# Patient Record
Sex: MALE | Race: WHITE | NOT HISPANIC OR LATINO | Employment: OTHER | URBAN - METROPOLITAN AREA
[De-identification: names, ages, dates, MRNs, and addresses within clinical notes are randomized per-mention and may not be internally consistent; named-entity substitution may affect disease eponyms.]

---

## 2017-01-02 ENCOUNTER — HOSPITAL ENCOUNTER (EMERGENCY)
Facility: HOSPITAL | Age: 67
Discharge: HOME | End: 2017-01-02
Attending: EMERGENCY MEDICINE
Payer: MEDICARE

## 2017-01-02 VITALS
WEIGHT: 235 LBS | BODY MASS INDEX: 36.88 KG/M2 | OXYGEN SATURATION: 96 % | HEART RATE: 65 BPM | TEMPERATURE: 99 F | SYSTOLIC BLOOD PRESSURE: 124 MMHG | HEIGHT: 67 IN | DIASTOLIC BLOOD PRESSURE: 57 MMHG | RESPIRATION RATE: 18 BRPM

## 2017-01-02 DIAGNOSIS — R07.81 RIB PAIN: ICD-10-CM

## 2017-01-02 PROCEDURE — 99284 EMERGENCY DEPT VISIT MOD MDM: CPT | Mod: ,,, | Performed by: EMERGENCY MEDICINE

## 2017-01-02 PROCEDURE — 94799 UNLISTED PULMONARY SVC/PX: CPT

## 2017-01-02 PROCEDURE — 99283 EMERGENCY DEPT VISIT LOW MDM: CPT

## 2017-01-02 PROCEDURE — 25000003 PHARM REV CODE 250: Performed by: EMERGENCY MEDICINE

## 2017-01-02 RX ORDER — HYDROCODONE BITARTRATE AND ACETAMINOPHEN 5; 325 MG/1; MG/1
2 TABLET ORAL
Status: COMPLETED | OUTPATIENT
Start: 2017-01-02 | End: 2017-01-02

## 2017-01-02 RX ORDER — HYDROCODONE BITARTRATE AND ACETAMINOPHEN 5; 325 MG/1; MG/1
1 TABLET ORAL EVERY 4 HOURS PRN
Qty: 18 TABLET | Refills: 0 | Status: SHIPPED | OUTPATIENT
Start: 2017-01-02 | End: 2017-06-12 | Stop reason: SDUPTHER

## 2017-01-02 RX ADMIN — HYDROCODONE BITARTRATE AND ACETAMINOPHEN 2 TABLET: 5; 325 TABLET ORAL at 05:01

## 2017-01-02 NOTE — ED PROVIDER NOTES
"Encounter Date: 1/2/2017    SCRIBE #1 NOTE: I, Debbie Arnold, am scribing for, and in the presence of,  Dr. Burciaga. I have scribed the entire note.       History     Chief Complaint   Patient presents with    Rib Injury     Pt reports sneezing and hearing a popping sound to left rib. States "feels like my rib popped."     Review of patient's allergies indicates:   Allergen Reactions    Keflex [cephalexin]     Peaches [peach (prunus persica)]     Shellfish containing products     Tuberculin spenser test ppd     Fig tree Rash     HPI Comments: Time patient was seen by the provider: 4:04 AM      The patient is a 66 y.o. male with history of: HLD, DVT, CAD, MI, PE, diverticulosis, and obesity that presents to the ED after sneezing and hearing "a pop" in his left rib. He now reports pain to the area with blowing his nose and movement. Patient endorses recent sore throat and sinus congestion, but denies fever, chills, or shortness of breath. He has a pacemaker and was worried he may have damaged it.       The history is provided by the patient.     Past Medical History   Diagnosis Date    Chronic anticoagulation 5/5/2016    Chronic combined systolic and diastolic heart failure 11/26/2012     EF 10-20% on ECHO 2013    Clotting disorder     Coronary artery disease involving native coronary artery of native heart without angina pectoris 11/26/2012     Cath 10- Stents patent non-obstructive disease Cath 11-12015 non-obstructive disease     Diverticulosis of colon     DVT (deep venous thrombosis), unspecified laterality 11/12/2015    Essential hypertension 11/15/2015    Hyperlipidemia     Hypertensive heart disease with heart failure 5/5/2016    MI (myocardial infarction) 2009    Nicotine abuse     Obesity 11/26/2012    Pulmonary embolism 2011    Stented coronary artery 11/26/2012     LAD stent placed 10/17/2007      Past Medical History Pertinent Negatives   Diagnosis Date Noted    Diabetes " mellitus, type 2 6/2/2014     Past Surgical History   Procedure Laterality Date    Back surgery      Appendectomy      Cardiac surgery       stent    Tonsillectomy      R knee scope       Family History   Problem Relation Age of Onset    Cancer Mother      colon cancer    Heart disease Mother     Heart disease Father     Stroke Father     Colon polyps Father     Cancer Paternal Grandmother      leukemia    Diabetes Neg Hx      Social History   Substance Use Topics    Smoking status: Former Smoker     Packs/day: 1.00     Years: 45.00     Types: Cigarettes     Quit date: 12/14/2015    Smokeless tobacco: Never Used      Comment: 1-1.5 ppd every day.    Alcohol use No     Review of Systems   Constitutional: Negative for chills and fever.   HENT: Positive for congestion and sore throat.    Respiratory: Negative for shortness of breath.    Musculoskeletal:        Positive for left rib pain       Physical Exam   Initial Vitals   BP Pulse Resp Temp SpO2   01/02/17 0349 01/02/17 0349 01/02/17 0349 01/02/17 0349 01/02/17 0349   124/57 66 18 98.6 °F (37 °C) 95 %     Physical Exam    Nursing note and vitals reviewed.  Constitutional: He appears well-developed and well-nourished. He is not diaphoretic. No distress.   HENT:   Head: Normocephalic and atraumatic.   Right Ear: External ear normal.   Left Ear: External ear normal.   Cardiovascular: Normal rate, regular rhythm and normal heart sounds.   No murmur heard.  Pulmonary/Chest: Breath sounds normal. No respiratory distress. He has no wheezes. He has no rales.   Musculoskeletal:   Tenderness to palpation to left anterior lower rib cage reproducing pain          ED Course   Procedures  Labs Reviewed - No data to display       X-Rays:   Independently Interpreted Readings:   Other Readings:  X-ray ribs - no acute process    Medical Decision Making:   History:   Old Medical Records: I decided to obtain old medical records.  Initial Assessment:   Patient with left  anterior rib pain likely secondary to rib strain or fracture. Will x-ray and discharge home.   Differential Diagnosis:   My initial differential diagnoses include but are not limited to: rib fracture, rib strain, pneumothorax.   Independently Interpreted Test(s):   I have ordered and independently interpreted X-rays - see prior notes.  Clinical Tests:   Radiological Study: Ordered    Additional MDM:   X-Rays: I have independently interpreted X-Ray(s) - see notes.          Scribe Attestation:   Scribe #1: I performed the above scribed service and the documentation accurately describes the services I performed. I attest to the accuracy of the note.    Attending Attestation:           Physician Attestation for Scribe:  Physician Attestation Statement for Scribe #1: I, Dr. Burciaga, reviewed documentation, as scribed by Debbie Arnold  in my presence, and it is both accurate and complete.         Attending ED Notes:   X-ray unremarkable. Will discharge home with pain medication and incentive spirometer.           ED Course     Clinical Impression:   The encounter diagnosis was Rib pain.    Disposition:   Disposition: Discharged  Condition: Stable       Peyman Burciaga III, MD  01/02/17 9080

## 2017-01-02 NOTE — ED AVS SNAPSHOT
[facility]  [address] [facility] [address]               Audrey Oneil JrFly   2017  3:56 AM   ED    Description:  Male : 1950   Department:  [facility]           Your Care was Coordinated By:     Provider Role From To    Peyman Burciaga III, MD Attending Provider 17 0357 --      Reason for Visit     Rib Injury           Diagnoses this Visit        Comments    Rib pain           ED Disposition     None           To Do List           Follow-up Information     Follow up with Km Thomas MD. Schedule an appointment as soon as possible for a visit in 3 days.    Specialty:  Internal Medicine    Contact information:    [address]  [facility] [address]  [phone]         These Medications        Disp Refills Start End    hydrocodone-acetaminophen 5-325mg (NORCO) 5-325 mg per tablet 18 tablet 0 2017     Take 1 tablet by mouth every 4 (four) hours as needed for Pain. - Oral    [facility] [address] [place] [address] [place] [address] AT [facility] [address] Ph #: [phone]         [facility]     [facility] Nurse Care Line -  Assistance  Registered nurses in the [facility] provide clinical advisement, health education, appointment booking, and other advisory services.  Call for this free service at [phone].             Medications           Message regarding Medications     Verify the changes and/or additions to your medication regime listed below are the same as discussed with your clinician today.  If any of these changes or additions are incorrect, please notify your healthcare provider.        START taking these NEW medications        Refills    hydrocodone-acetaminophen 5-325mg (NORCO) 5-325 mg per tablet 0    Sig: Take 1 tablet by mouth every 4 (four) hours as needed for Pain.    Class: Print    Route: Oral      STOP taking these medications      hydrocodone-acetaminophen 10-325mg (NORCO)  mg Tab Take 1 tablet by mouth every 4 (four) hours as needed for Pain.    VICODIN ES 7.5-300 mg Tab            Verify that the below list of medications is an accurate representation of the medications you are currently taking.  If none reported, the list may be blank. If incorrect, please contact your healthcare provider. Carry this list with you in case of emergency.           Current Medications     amiodarone (PACERONE) 200 MG Tab Take 1.5 tablets (300 mg total) by mouth once daily.    aspirin (ECOTRIN) 325 MG EC tablet Take 325 mg by mouth once daily.    atorvastatin (LIPITOR) 80 MG tablet Take 1 tablet (80 mg total) by mouth once daily.    clopidogrel (PLAVIX) 75 mg tablet Take 75 mg by mouth once daily.    clopidogrel (PLAVIX) 75 mg tablet TAKE 1 TABLET BY MOUTH EVERY DAY    dexamethasone (DECADRON) 0.1 % DrpS 2 drops every 12 (twelve) hours.    dexlansoprazole (DEXILANT) 60 mg capsule Take 60 mg by mouth once daily.    docusate sodium (COLACE) 50 MG capsule Take 1 capsule (50 mg total) by mouth once daily.    furosemide (LASIX) 40 MG tablet Take 1 tablet (40 mg total) by mouth once daily.    hydrocodone-acetaminophen 5-325mg (NORCO) 5-325 mg per tablet Take 1 tablet by mouth every 4 (four) hours as needed for Pain.    lisinopril (PRINIVIL,ZESTRIL) 5 MG tablet Take 1 tablet (5 mg total) by mouth once daily.    metoprolol succinate (TOPROL-XL) 50 MG 24 hr tablet TAKE 1 TABLET BY MOUTH ONCE DAILY    nitroGLYCERIN (NITROSTAT) 0.4 MG SL tablet Place 1 tablet (0.4 mg total) under the tongue every 5 (five) minutes as needed for Chest pain.    ondansetron (ZOFRAN-ODT) 4 MG TbDL Take 1 tablet (4 mg total) by mouth every 8 (eight) hours as needed.    polyethylene glycol (GLYCOLAX) 17 gram/dose powder Take 17 g by mouth once daily.    promethazine (PHENERGAN) 25 MG tablet Take 1 tablet (25 mg total) by mouth every 4 (four) hours as needed for Nausea.    spironolactone  "(ALDACTONE) 25 MG tablet TAKE 1 TABLET(25 MG) BY MOUTH EVERY DAY           Clinical Reference Information           Your Vitals Were     BP Pulse Temp Resp Height Weight    124/57 65 98.6 °F (37 °C) (Oral) 18 5' 7" (1.702 m) 106.6 kg (235 lb)    SpO2 BMI             96% 36.81 kg/m2         Allergies as of 1/2/2017        Reactions    Keflex [Cephalexin]     Peaches [Peach (Prunus Persica)]     Shellfish Containing Products     Tuberculin Shital Test Ppd     Fig Tree Rash      Immunizations Administered on Date of Encounter - 1/2/2017     None      ED Micro, Lab, POCT     None      ED Imaging Orders     Start Ordered       Status Ordering Provider    01/02/17 0410 01/02/17 0410  X-Ray Ribs 2 View Left  1 time imaging      Final result       Discharge References/Attachments     RIB CONTUSION OR MINOR FRACTURE (ENGLISH)      Your Scheduled Appointments     Mar 13, 2017 11:40 AM CDT   Debrillator Tune Up with PACEMAKER, ICD   [facility] ([address] ) [address]  [facility] [address] [phone]              Smoking Cessation     If you would like to quit smoking:   You may be eligible for free services if you are a [place] resident and started smoking cigarettes before September 1, 1988.  Call the Smoking Cessation Trust ([facility]) toll free at (406) 069-4265 or ([phone] 963-9939.   Call 0-478-QUIT-NOW if you do not meet the above criteria.             [facility] complies with applicable Federal civil rights laws and does not discriminate on the basis of race, color, national origin, age, disability, or sex.        Language Assistance Services     ATTENTION: Language assistance services are available, free of charge. Please call [phone].      ATENCIÓN: Si habla español, tiene a garcia disposición servicios gratuitos de asistencia lingüística. Llame al [phone].     CHÚ Ý: N?u b?n nói Ti?ng Vi?t, có các d?ch v? h? tr? ngôn ng? mi?n phí dành cho b?n. G?i s? " [phone].

## 2017-01-02 NOTE — ED NOTES
Pt assisted to bed. Pt identifiers verified.  Appearance: Pt is awake and alert to person, place and time Pt is clean and well groomed with clothes appropriately fastened  Respiratory: respirations are even and unlabored. Airway is patent  Skin: skin is warm, dry, intact and appropriately colored for ethnicity  Neurologic: pt is moving all extremities without difficulty. Sensation is intact. Speech is clear and appropriate. Facial movement is symmetrical.   Cardiac: No edema noted. Peripheral pulses are intact and palpable. Cap refill is <3 seconds.     Bed is in low, locked position with side rails upx2. Call light is in reach. Will continue to monitor

## 2017-01-02 NOTE — ED TRIAGE NOTES
66 year old pt presents to the ed with complaints of a rib injury. Pt states he hit his chest and felt something pop. Pt denies any chest pain sob or numbness to any extremities.

## 2017-01-05 ENCOUNTER — HOSPITAL ENCOUNTER (EMERGENCY)
Facility: HOSPITAL | Age: 67
Discharge: HOME | End: 2017-01-05
Attending: FAMILY MEDICINE
Payer: MEDICARE

## 2017-01-05 VITALS
HEIGHT: 67 IN | OXYGEN SATURATION: 96 % | SYSTOLIC BLOOD PRESSURE: 119 MMHG | RESPIRATION RATE: 18 BRPM | WEIGHT: 234 LBS | DIASTOLIC BLOOD PRESSURE: 61 MMHG | BODY MASS INDEX: 36.73 KG/M2 | TEMPERATURE: 99 F | HEART RATE: 75 BPM

## 2017-01-05 DIAGNOSIS — M54.12 CERVICAL RADICULOPATHY: Primary | ICD-10-CM

## 2017-01-05 PROCEDURE — 96372 THER/PROPH/DIAG INJ SC/IM: CPT

## 2017-01-05 PROCEDURE — 93010 ELECTROCARDIOGRAM REPORT: CPT | Mod: ,,, | Performed by: INTERNAL MEDICINE

## 2017-01-05 PROCEDURE — 99284 EMERGENCY DEPT VISIT MOD MDM: CPT | Mod: 25

## 2017-01-05 PROCEDURE — 63600175 PHARM REV CODE 636 W HCPCS: Performed by: FAMILY MEDICINE

## 2017-01-05 PROCEDURE — 93005 ELECTROCARDIOGRAM TRACING: CPT

## 2017-01-05 PROCEDURE — 99283 EMERGENCY DEPT VISIT LOW MDM: CPT | Mod: ,,, | Performed by: FAMILY MEDICINE

## 2017-01-05 RX ORDER — TRIAMCINOLONE ACETONIDE 40 MG/ML
80 INJECTION, SUSPENSION INTRA-ARTICULAR; INTRAMUSCULAR
Status: COMPLETED | OUTPATIENT
Start: 2017-01-05 | End: 2017-01-05

## 2017-01-05 RX ADMIN — TRIAMCINOLONE ACETONIDE 80 MG: 40 INJECTION, SUSPENSION INTRA-ARTICULAR; INTRAMUSCULAR at 07:01

## 2017-01-05 NOTE — ED AVS SNAPSHOT
[facility]  [address]  [facility] [address]               Audrey Oneil JrFly   2017  7:03 PM   ED    Description:  Male : 1950   Department:  [facility]           Your Care was Coordinated By:     Provider Role From To    Dez Berger MD Attending Provider 17 3023 --      Reason for Visit     Wrist Pain           Diagnoses this Visit        Comments    Cervical radiculopathy    -  Primary       ED Disposition     ED Disposition Condition Comment    Discharge  D/C after injection time.            To Do List           Follow-up Information     Follow up with Km Thomas MD In 3 days.    Specialty:  Internal Medicine    Why:  for document improvement.     Contact information:    [address]  [facility] [address]  [phone]        [facility]     [facility] Care [facility] -  Assistance  Registered nurses in the [facility] provide clinical advisement, health education, appointment booking, and other advisory services.  Call for this free service at [phone].             Medications           Message regarding Medications     Verify the changes and/or additions to your medication regime listed below are the same as discussed with your clinician today.  If any of these changes or additions are incorrect, please notify your healthcare provider.        These medications were administered today        Dose Freq    triamcinolone acetonide injection 80 mg 80 mg ED 1 Time    Sig: Inject 2 mLs (80 mg total) into the muscle ED 1 Time.    Class: Normal    Route: Intramuscular           Verify that the below list of medications is an accurate representation of the medications you are currently taking.  If none reported, the list may be blank. If incorrect, please contact your healthcare provider. Carry this list with you in case of emergency.           Current Medications     amiodarone (PACERONE)  "200 MG Tab Take 1.5 tablets (300 mg total) by mouth once daily.    aspirin (ECOTRIN) 325 MG EC tablet Take 325 mg by mouth once daily.    atorvastatin (LIPITOR) 80 MG tablet Take 1 tablet (80 mg total) by mouth once daily.    clopidogrel (PLAVIX) 75 mg tablet Take 75 mg by mouth once daily.    clopidogrel (PLAVIX) 75 mg tablet TAKE 1 TABLET BY MOUTH EVERY DAY    dexamethasone (DECADRON) 0.1 % DrpS 2 drops every 12 (twelve) hours.    dexlansoprazole (DEXILANT) 60 mg capsule Take 60 mg by mouth once daily.    docusate sodium (COLACE) 50 MG capsule Take 1 capsule (50 mg total) by mouth once daily.    furosemide (LASIX) 40 MG tablet Take 1 tablet (40 mg total) by mouth once daily.    hydrocodone-acetaminophen 5-325mg (NORCO) 5-325 mg per tablet Take 1 tablet by mouth every 4 (four) hours as needed for Pain.    lisinopril (PRINIVIL,ZESTRIL) 5 MG tablet Take 1 tablet (5 mg total) by mouth once daily.    metoprolol succinate (TOPROL-XL) 50 MG 24 hr tablet TAKE 1 TABLET BY MOUTH ONCE DAILY    ondansetron (ZOFRAN-ODT) 4 MG TbDL Take 1 tablet (4 mg total) by mouth every 8 (eight) hours as needed.    polyethylene glycol (GLYCOLAX) 17 gram/dose powder Take 17 g by mouth once daily.    promethazine (PHENERGAN) 25 MG tablet Take 1 tablet (25 mg total) by mouth every 4 (four) hours as needed for Nausea.    spironolactone (ALDACTONE) 25 MG tablet TAKE 1 TABLET(25 MG) BY MOUTH EVERY DAY    nitroGLYCERIN (NITROSTAT) 0.4 MG SL tablet Place 1 tablet (0.4 mg total) under the tongue every 5 (five) minutes as needed for Chest pain.    triamcinolone acetonide injection 80 mg Inject 2 mLs (80 mg total) into the muscle ED 1 Time.           Clinical Reference Information           Your Vitals Were     BP Pulse Temp Resp Height Weight    119/61 75 98.7 °F (37.1 °C) (Oral) 18 5' 7" (1.702 m) 106.1 kg (234 lb)    SpO2 BMI             96% 36.65 kg/m2         Allergies as of 1/5/2017        Reactions    Keflex [Cephalexin]     Peaches [Peach " (Prunus Persica)]     Shellfish Containing Products     Tuberculin Shital Test Ppd     Fig Tree Rash      Immunizations Administered on Date of Encounter - 1/5/2017     None      ED Micro, Lab, POCT     None      ED Imaging Orders     None        Discharge Instructions         Understanding Cervical Radiculopathy    Cervical radiculopathy is irritation or inflammation of a nerve root in the neck. It causes neck pain and other symptoms that may spread into the chest or down the arm. To understand this condition, it helps to understand the parts of the spine:  · Vertebrae. These are bones that stack to form the spine. The cervical spine contains the 7 vertebrae in the neck.  · Disks. These are soft pads of tissue between the vertebrae. They act as shock absorbers for the spine.  · The spinal canal. This is a tunnel formed within the stacked vertebrae. The spinal cord runs through this canal.  · Nerves. These branch off the spinal cord. As they leave the spinal canal, nerves pass through openings between the vertebrae. The nerve root is the part of the nerve that is closest to the spinal cord.   With cervical radiculopathy, nerve roots in the neck become irritated. This leads to pain and symptoms that can travel to the nerves that go from the spinal cord down the arms and into the torso.  What causes cervical radiculopathy?  Aging, injury, poor posture, and other issues can lead to problems in the neck. These problems may then irritate nerve roots. These include:  · Damage to a disk in the cervical spine. The damaged disk may then press on nearby nerve roots.  · Degeneration from wear and tear, and aging. This can lead to narrowing (stenosis) of the openings between the vertebrae. The narrowed openings press on nerve roots as they leave the spinal canal.  · An unstable spine. This is when a vertebra slips forward. It can then press on a nerve root.  There are other, less common causes of pressure on nerves in the neck.  These include infection, cysts, and tumors.  Symptoms of cervical radiculopathy  These include:  · Neck pain  · Pain, numbness, tingling, or weakness that travels down the arm  · Loss of neck movement  · Muscle spasms  Treatment for cervical radiculopathy  In most cases, your healthcare provider will first try treatments that help relieve symptoms. These may include:  · Prescription or over-the-counter pain medicines. These help relieve pain and swelling.  · Cold packs. These help reduce pain.  · Resting. This involves avoiding positions and activities that increase pain.  · Neck brace (cervical collar). This can help relieve inflammation and pain.  · Physical therapy, including exercises and stretches. This can help decrease pain and increase movement and function.  · Shots of medicinesaround the nerve roots. This is done to help relieve symptoms for a time.  In some cases, your healthcare provider may advise surgery to fix the underlying problem. This depends on the cause, the symptoms, and how long the pain has lasted.  Possible complications  Over time, an irritated and inflamed nerve may become damaged. This may lead to long-lasting (permanent) numbness or weakness. If symptoms change suddenly or get worse, be sure to let your healthcare provider know.     When to call your healthcare provider  Call your healthcare provider right away if you have any of these:  · New pain or pain that gets worse  · New or increasing weakness, numbness, or tingling in your arm or hand  · Bowel or bladder changes   © 2682-3262 [employer]. [address] [place] [address], [place] [address]. All rights reserved. This information is not intended as a substitute for professional medical care. Always follow your healthcare professional's instructions.          Your Scheduled Appointments     Mar 13, 2017 11:40 AM CDT   Debrillator Tune Up with PACEMAKER, ICD   [facility] ([address] [address] )    [address]  [place]  [facility] [id number] [phone]              Smoking Cessation     If you would like to quit smoking:   You may be eligible for free services if you are a [place] resident and started smoking cigarettes before September 1, 1988.  Call the Smoking Cessation Trust ([employer]) toll free at (607) 823-1866 or (850) 542-0319.   Call 1-800-QUIT-NOW if you do not meet the above criteria.             [facility] complies with applicable Federal civil rights laws and does not discriminate on the basis of race, color, national origin, age, disability, or sex.        Language Assistance Services     ATTENTION: Language assistance services are available, free of charge. Please call [phone].      ATENCIÓN: Si habla gustavo, tiene a garcia disposición servicios gratuitos de asistencia lingüística. Llame al [phone].     CHÚ Ý: N?u b?n nói Ti?ng Vi?t, có các d?ch v? h? tr? ngôn ng? mi?n phí dành cho b?n. G?i s? [phone].

## 2017-01-05 NOTE — PROVIDER PROGRESS NOTES - EMERGENCY DEPT.
Encounter Date: 1/5/2017    ED Physician Progress Notes       SCRIBE NOTE: I, Kait Sears, am scribing for, and in the presence of,  Dr. Romero.  Physician Statement: I, Dr. Romero, personally performed the services described in this documentation as scribed by Kait Sears in my presence, and it is both accurate and complete.      EKG - STEMI Decision  Initial Reading: No STEMI present.

## 2017-01-06 NOTE — DISCHARGE INSTRUCTIONS
Understanding Cervical Radiculopathy    Cervical radiculopathy is irritation or inflammation of a nerve root in the neck. It causes neck pain and other symptoms that may spread into the chest or down the arm. To understand this condition, it helps to understand the parts of the spine:  · Vertebrae. These are bones that stack to form the spine. The cervical spine contains the 7 vertebrae in the neck.  · Disks. These are soft pads of tissue between the vertebrae. They act as shock absorbers for the spine.  · The spinal canal. This is a tunnel formed within the stacked vertebrae. The spinal cord runs through this canal.  · Nerves. These branch off the spinal cord. As they leave the spinal canal, nerves pass through openings between the vertebrae. The nerve root is the part of the nerve that is closest to the spinal cord.   With cervical radiculopathy, nerve roots in the neck become irritated. This leads to pain and symptoms that can travel to the nerves that go from the spinal cord down the arms and into the torso.  What causes cervical radiculopathy?  Aging, injury, poor posture, and other issues can lead to problems in the neck. These problems may then irritate nerve roots. These include:  · Damage to a disk in the cervical spine. The damaged disk may then press on nearby nerve roots.  · Degeneration from wear and tear, and aging. This can lead to narrowing (stenosis) of the openings between the vertebrae. The narrowed openings press on nerve roots as they leave the spinal canal.  · An unstable spine. This is when a vertebra slips forward. It can then press on a nerve root.  There are other, less common causes of pressure on nerves in the neck. These include infection, cysts, and tumors.  Symptoms of cervical radiculopathy  These include:  · Neck pain  · Pain, numbness, tingling, or weakness that travels down the arm  · Loss of neck movement  · Muscle spasms  Treatment for cervical radiculopathy  In most cases,  your healthcare provider will first try treatments that help relieve symptoms. These may include:  · Prescription or over-the-counter pain medicines. These help relieve pain and swelling.  · Cold packs. These help reduce pain.  · Resting. This involves avoiding positions and activities that increase pain.  · Neck brace (cervical collar). This can help relieve inflammation and pain.  · Physical therapy, including exercises and stretches. This can help decrease pain and increase movement and function.  · Shots of medicinesaround the nerve roots. This is done to help relieve symptoms for a time.  In some cases, your healthcare provider may advise surgery to fix the underlying problem. This depends on the cause, the symptoms, and how long the pain has lasted.  Possible complications  Over time, an irritated and inflamed nerve may become damaged. This may lead to long-lasting (permanent) numbness or weakness. If symptoms change suddenly or get worse, be sure to let your healthcare provider know.     When to call your healthcare provider  Call your healthcare provider right away if you have any of these:  · New pain or pain that gets worse  · New or increasing weakness, numbness, or tingling in your arm or hand  · Bowel or bladder changes   © 4571-6451 [employer]. [address] [place] [address], [place] [address]. All rights reserved. This information is not intended as a substitute for professional medical care. Always follow your healthcare professional's instructions.

## 2017-01-06 NOTE — PROVIDER PROGRESS NOTES - EMERGENCY DEPT.
Encounter Date: 1/5/2017    ED Physician Progress Notes        Physician Note:   Normal sinus rhythm with ventricular intraventricular conduction delay.  No change from prior EKG    EKG - STEMI Decision  Initial Reading: No STEMI present.

## 2017-01-06 NOTE — ED TRIAGE NOTES
Pt reports right arm and hand pain that began yesterday and has gotten progressively worse. Pt reports it is extremely painful to move his right arm but he can move it, extremity is warm to touch and redness is noted. No other symptoms reported.

## 2017-01-06 NOTE — PROVIDER PROGRESS NOTES - EMERGENCY DEPT.
Encounter Date: 1/5/2017    ED Physician Progress Notes        Physician Note:    Reviewed data from the [place], [place] and [facility] controlled drug databases.  Regarding prescriptions.  Daily hydrocodone therapy.  30 mg per day

## 2017-01-06 NOTE — ED NOTES
Appearance: Pt awake, alert & oriented to person, place & time. Pt in no acute distress at present time.   Skin: Skin warm, dry & intact. Mucous membranes moist. Skin turgor normal.   Respiratory: Respirations even, non-labored.   Neurologic: Pt moving all extremities without difficulty. Sensation intact.   Peripheral Vascular: All peripheral pulses present.  Focused: RUE extremity pain, redness and and warmth.

## 2017-01-06 NOTE — ED PROVIDER NOTES
"Encounter Date: 1/5/2017    SCRIBE #1 NOTE: I, Sena Hutchison, am scribing for, and in the presence of, Dr. Berger .       History     Chief Complaint   Patient presents with    Wrist Pain     both wrist and both hands , i can't even lift anything, started yesterday     Review of patient's allergies indicates:   Allergen Reactions    Keflex [cephalexin]     Peaches [peach (prunus persica)]     Shellfish containing products     Tuberculin spenser test ppd     Fig tree Rash     HPI Comments: Time seen by provider: 7:23 PM    This is a 66 y.o. male with history of HTN, HLD, CAD, CHF who presents with complaint of painful ROM of right forearm and hand since yesterday. Pt reports the pain radiates from the bone in his right hand all the way to his shoulder. Specifically, he endorses that the pain feels like "a muscle spilt in gymnastics". Pt reports spilling boiling water on his right hand but did not think his pain was associated with that incident. He described that he wants the pain to go away but does not want pain medication to bring home.         The history is provided by the patient.     Past Medical History   Diagnosis Date    Chronic anticoagulation 5/5/2016    Chronic combined systolic and diastolic heart failure 11/26/2012     EF 10-20% on ECHO 2013    Clotting disorder     Coronary artery disease involving native coronary artery of native heart without angina pectoris 11/26/2012     Cath 10- Stents patent non-obstructive disease Cath 11-12015 non-obstructive disease     Diverticulosis of colon     DVT (deep venous thrombosis), unspecified laterality 11/12/2015    Essential hypertension 11/15/2015    Hyperlipidemia     Hypertensive heart disease with heart failure 5/5/2016    MI (myocardial infarction) 2009    Nicotine abuse     Obesity 11/26/2012    Pulmonary embolism 2011    Stented coronary artery 11/26/2012     LAD stent placed 10/17/2007      Past Medical History Pertinent Negatives "   Diagnosis Date Noted    Diabetes mellitus, type 2 6/2/2014     Past Surgical History   Procedure Laterality Date    Back surgery      Appendectomy      Cardiac surgery       stent    Tonsillectomy      R knee scope       Family History   Problem Relation Age of Onset    Cancer Mother      colon cancer    Heart disease Mother     Heart disease Father     Stroke Father     Colon polyps Father     Cancer Paternal Grandmother      leukemia    Diabetes Neg Hx      Social History   Substance Use Topics    Smoking status: Former Smoker     Packs/day: 1.00     Years: 45.00     Types: Cigarettes     Quit date: 12/14/2015    Smokeless tobacco: Never Used      Comment: 1-1.5 ppd every day.    Alcohol use No     Review of Systems   HENT: Negative for rhinorrhea.    Musculoskeletal:        Painful ROM of right forearm and hand.       Physical Exam   Initial Vitals   BP Pulse Resp Temp SpO2   01/05/17 1636 01/05/17 1636 01/05/17 1636 01/05/17 1636 01/05/17 1636   119/61 75 18 98.7 °F (37.1 °C) 96 %     Physical Exam    Nursing note and vitals reviewed.  Constitutional:   Disheveled.    HENT:   Head: Atraumatic.   Cardiovascular: Normal rate, regular rhythm, normal heart sounds and intact distal pulses.   2+ radial pulses bilaterally.    Pulmonary/Chest: Breath sounds normal. No respiratory distress. He has no wheezes. He has no rales.   Musculoskeletal:   Capillary refill in both hands. Painful ROM of right forearm and hand.    Neurological: He is alert and oriented to person, place, and time.   Skin:   No obvious hand fluctuants.          ED Course   Procedures  Labs Reviewed - No data to display          Medical Decision Making:   History:   Old Medical Records: I decided to obtain old medical records.  Initial Assessment:   Reviewed patient's prior EKG, x-rays and lab studies  Independently Interpreted Test(s):   I have ordered and independently interpreted EKG Reading(s) - see summary below       <> Summary  of EKG Reading(s): Sinus rhythm, no acute change from prior tracing  ED Management:  Patient's pain, not appear to be cardiac or pulmonary based on patient's symptoms and physical findings and EKG results cervical radiculopathy.  We'll manage short acting steroid injection.  Patient should follow-up with his regular care providers as this is been an ongoing problem for him in the past and he's having an acute flareup today            Scribe Attestation:   Scribe #1: I performed the above scribed service and the documentation accurately describes the services I performed. I attest to the accuracy of the note.    Attending Attestation:           Physician Attestation for Scribe:  Physician Attestation Statement for Scribe #1: I, Dr. Berger , reviewed documentation, as scribed by Sena Hutchison  in my presence, and it is both accurate and complete.                 ED Course     Clinical Impression:   The encounter diagnosis was Cervical radiculopathy.    Disposition:   Disposition: Discharged       Dez Berger MD  01/06/17 9110

## 2017-02-02 ENCOUNTER — OFFICE VISIT (OUTPATIENT)
Dept: TRANSPLANT | Facility: CLINIC | Age: 67
End: 2017-02-02
Payer: MEDICARE

## 2017-02-02 ENCOUNTER — LAB VISIT (OUTPATIENT)
Dept: LAB | Facility: HOSPITAL | Age: 67
End: 2017-02-02
Attending: INTERNAL MEDICINE
Payer: MEDICARE

## 2017-02-02 VITALS
DIASTOLIC BLOOD PRESSURE: 57 MMHG | BODY MASS INDEX: 37.27 KG/M2 | HEIGHT: 67 IN | SYSTOLIC BLOOD PRESSURE: 86 MMHG | HEART RATE: 58 BPM | WEIGHT: 237.44 LBS

## 2017-02-02 DIAGNOSIS — I25.5 CARDIOMYOPATHY, ISCHEMIC: Chronic | ICD-10-CM

## 2017-02-02 DIAGNOSIS — I25.10 CORONARY ARTERY DISEASE INVOLVING NATIVE CORONARY ARTERY OF NATIVE HEART WITHOUT ANGINA PECTORIS: ICD-10-CM

## 2017-02-02 DIAGNOSIS — I10 ESSENTIAL HYPERTENSION: ICD-10-CM

## 2017-02-02 DIAGNOSIS — I50.42 CHRONIC COMBINED SYSTOLIC AND DIASTOLIC HEART FAILURE: Chronic | ICD-10-CM

## 2017-02-02 DIAGNOSIS — E78.5 HYPERLIPIDEMIA LDL GOAL <70: ICD-10-CM

## 2017-02-02 DIAGNOSIS — I50.42 CHRONIC COMBINED SYSTOLIC AND DIASTOLIC HEART FAILURE: Primary | Chronic | ICD-10-CM

## 2017-02-02 DIAGNOSIS — Z95.810 AICD (AUTOMATIC CARDIOVERTER/DEFIBRILLATOR) PRESENT: ICD-10-CM

## 2017-02-02 LAB
ALBUMIN SERPL BCP-MCNC: 3.7 G/DL
ALP SERPL-CCNC: 101 U/L
ALT SERPL W/O P-5'-P-CCNC: 32 U/L
ANION GAP SERPL CALC-SCNC: 10 MMOL/L
ANION GAP SERPL CALC-SCNC: 10 MMOL/L
AST SERPL-CCNC: 18 U/L
BILIRUB SERPL-MCNC: 0.4 MG/DL
BNP SERPL-MCNC: 158 PG/ML
BUN SERPL-MCNC: 28 MG/DL
BUN SERPL-MCNC: 28 MG/DL
CALCIUM SERPL-MCNC: 9 MG/DL
CALCIUM SERPL-MCNC: 9 MG/DL
CHLORIDE SERPL-SCNC: 104 MMOL/L
CHLORIDE SERPL-SCNC: 104 MMOL/L
CO2 SERPL-SCNC: 26 MMOL/L
CO2 SERPL-SCNC: 26 MMOL/L
CREAT SERPL-MCNC: 1.3 MG/DL
CREAT SERPL-MCNC: 1.3 MG/DL
EST. GFR  (AFRICAN AMERICAN): >60 ML/MIN/1.73 M^2
EST. GFR  (AFRICAN AMERICAN): >60 ML/MIN/1.73 M^2
EST. GFR  (NON AFRICAN AMERICAN): 57.7 ML/MIN/1.73 M^2
EST. GFR  (NON AFRICAN AMERICAN): 57.7 ML/MIN/1.73 M^2
GLUCOSE SERPL-MCNC: 104 MG/DL
GLUCOSE SERPL-MCNC: 104 MG/DL
POTASSIUM SERPL-SCNC: 4.5 MMOL/L
POTASSIUM SERPL-SCNC: 4.5 MMOL/L
PROT SERPL-MCNC: 6.8 G/DL
SODIUM SERPL-SCNC: 140 MMOL/L
SODIUM SERPL-SCNC: 140 MMOL/L

## 2017-02-02 PROCEDURE — 99499 UNLISTED E&M SERVICE: CPT | Mod: S$GLB,,, | Performed by: INTERNAL MEDICINE

## 2017-02-02 PROCEDURE — 3074F SYST BP LT 130 MM HG: CPT | Mod: S$GLB,,, | Performed by: INTERNAL MEDICINE

## 2017-02-02 PROCEDURE — 99214 OFFICE O/P EST MOD 30 MIN: CPT | Mod: S$GLB,,, | Performed by: INTERNAL MEDICINE

## 2017-02-02 PROCEDURE — 99999 PR PBB SHADOW E&M-EST. PATIENT-LVL III: CPT | Mod: PBBFAC,,, | Performed by: INTERNAL MEDICINE

## 2017-02-02 PROCEDURE — 3078F DIAST BP <80 MM HG: CPT | Mod: S$GLB,,, | Performed by: INTERNAL MEDICINE

## 2017-02-02 NOTE — PROGRESS NOTES
Patient, Audrey Oneil Jr. (MRN #[id number]), presented with a recorded BMI of 37.19 kg/m^2 and a documented comorbidity(s):  - Hypertension  - Hyperlipidemia  - Atrial Fibrillation  to which the severe obesity is a contributing factor. This is consistent with the definition of severe obesity (BMI 35.0-35.9) with comorbidity (ICD-10 E66.01, Z68.35). The patient's severe obesity was monitored, evaluated, addressed and/or treated. This addendum to the medical record is made on 02/02/2017.

## 2017-02-02 NOTE — PROGRESS NOTES
"Subjective:     HPI:  Mr. Oneil is a very pleasant 66 yo male with a hx of CAD s/p STEMI(s/p PCI LAD 2007), CHF/ICM and remote MI, quit smoking Dec 2015,  PE (2010, s/p 1-yr coumadin), HTN, DLP and s/p ICD [facility] placement due to VT who comes for an urgent visit. He usually follows with Dr. Henry. At that time patient was very confused with HF regimen. His regimen was adjusted and he was started on Entresto, spironolactone and his coreg was changed to metoprolol. Patient states that he had stopped his Lisinopril as instructed for few days before starting Entresto however after 3 days on Entresto patient develop trouble swallowing and breathing states that "his throat was swollen". Denies any tongue swelling but states that his face was swollen. This is his 3rd visait with me. Remains stable with NYHA class II-III symptoms. No PND or orthopnea. No recent HF admissions. Labs are pending.      Past Medical History   Diagnosis Date    Chronic anticoagulation 5/5/2016    Chronic combined systolic and diastolic heart failure 11/26/2012     EF 10-20% on ECHO 2013    Clotting disorder     Coronary artery disease involving native coronary artery of native heart without angina pectoris 11/26/2012     Cath 10- Stents patent non-obstructive disease Cath 11-12015 non-obstructive disease     Diverticulosis of colon     DVT (deep venous thrombosis), unspecified laterality 11/12/2015    Essential hypertension 11/15/2015    Hyperlipidemia     Hypertensive heart disease with heart failure 5/5/2016    MI (myocardial infarction) 2009    Nicotine abuse     Obesity 11/26/2012    Pulmonary embolism 2011    Stented coronary artery 11/26/2012     LAD stent placed 10/17/2007      Past Surgical History   Procedure Laterality Date    Back surgery      Appendectomy      Cardiac surgery       stent    Tonsillectomy      R knee scope         Review of Systems   Constitution: Negative. Negative for chills, decreased " "appetite, diaphoresis, fever, weakness, malaise/fatigue, night sweats, weight gain and weight loss.   Eyes: Negative.    Cardiovascular: Negative for chest pain, claudication, cyanosis, dyspnea on exertion, irregular heartbeat, leg swelling, near-syncope, orthopnea, palpitations, paroxysmal nocturnal dyspnea and syncope.   Respiratory: Negative for cough, hemoptysis and shortness of breath.    Endocrine: Negative.    Hematologic/Lymphatic: Negative.    Skin: Negative for color change, dry skin and nail changes.   Musculoskeletal: Negative.    Gastrointestinal: Negative.    Genitourinary: Negative.        Objective:   Blood pressure (!) 86/57, pulse (!) 58, height 5' 7" (1.702 m), weight 107.7 kg (237 lb 7 oz).body mass index is 37.19 kg/(m^2).  Physical Exam   Constitutional: He appears well-developed.   BP (!) 86/57  Pulse (!) 58  Ht 5' 7" (1.702 m)  Wt 107.7 kg (237 lb 7 oz)  BMI 37.19 kg/m2     HENT:   Head: Normocephalic.   Neck: JVD present. Carotid bruit is not present.   Cardiovascular: Regular rhythm, normal heart sounds and normal pulses.  PMI is displaced.  Exam reveals no gallop and no S3.    No murmur heard.  Pulmonary/Chest: Effort normal and breath sounds normal. No respiratory distress. He has no wheezes. He has no rales.   Abdominal: Soft. Bowel sounds are normal. He exhibits no distension. There is no tenderness. There is no rebound.   Musculoskeletal: He exhibits no edema.   Neurological: He is alert.   Skin: Skin is warm.   Vitals reviewed.      Labs:    Chemistry        Component Value Date/Time     12/08/2016 0758    K 4.0 12/08/2016 0758     12/08/2016 0758    CO2 23 12/08/2016 0758    BUN 42 (H) 12/08/2016 0758    CREATININE 1.5 (H) 12/08/2016 0758     12/08/2016 0758        Component Value Date/Time    CALCIUM 9.0 12/08/2016 0758    ALKPHOS 71 12/08/2016 0758    AST 18 12/08/2016 0758    ALT 29 12/08/2016 0758    BILITOT 0.3 12/08/2016 0758          Magnesium   Date Value " Ref Range Status   09/27/2016 2.0 1.6 - 2.6 mg/dL Final     Lab Results   Component Value Date    WBC 8.69 12/08/2016    HGB 14.3 12/08/2016    HCT 42.8 12/08/2016     12/08/2016     Lab Results   Component Value Date    INR 0.9 12/08/2016    INR 0.9 07/03/2016    INR 1.1 12/06/2015     BNP   Date Value Ref Range Status   02/02/2017 158 (H) 0 - 99 pg/mL Final     Comment:     Values of less than 100 pg/ml are consistent with non-CHF populations.   09/27/2016 82 0 - 99 pg/mL Final     Comment:     Values of less than 100 pg/ml are consistent with non-CHF populations.   08/12/2016 114 (H) 0 - 99 pg/mL Final     Comment:     Values of less than 100 pg/ml are consistent with non-CHF populations.     No results found for: LDH  No results found for this or any previous visit.  No results found for this or any previous visit.    Assessment:      1. Chronic combined systolic and diastolic heart failure    2. Cardiomyopathy, ischemic    3. Coronary artery disease involving native coronary artery of native heart without angina pectoris    4. Essential hypertension    5. Hyperlipidemia LDL goal <70    6. AICD (automatic cardioverter/defibrillator) present        Plan:   Reports NYHA class II-III symptoms but may be downplaying his symptoms.   Most recent 2D echo with CFD showed enlarged LV (with severely depressed EF). In view of his class III symptoms and low BP, I would like to repeat his CPX for risk stratification.  Recommend 2 gram sodium restriction and 1500cc fluid restriction.  Encourage physical activity with graded exercise program.  Requested patient to weigh themselves daily, and to notify us if their weight increases by more than 3 lbs in 1 day or 5 lbs in 1 week.    Repeat CPX   RTC in 3 months with labs       Edison Marshall MD

## 2017-02-02 NOTE — MR AVS SNAPSHOT
[facility]  [address]  [facility] [address]  Phone: [phone]                  Audrey Oneil JrFly   2017 10:00 AM   Office Visit    Description:  Male : 1950   Provider:  Edison Marshall MD   Department:  [facility]           Reason for Visit     Congestive Heart Failure           Diagnoses this Visit        Comments    Chronic combined systolic and diastolic heart failure    -  Primary     Cardiomyopathy, ischemic         Coronary artery disease involving native coronary artery of native heart without angina pectoris         Essential hypertension         Hyperlipidemia LDL goal <70         AICD (automatic cardioverter/defibrillator) present                To Do List           Future Appointments        Provider Department Dept Phone    2017 10:00 AM Edison Marshall MD [facility] [phone]    3/13/2017 11:40 AM PACEMAKER, [facility] - Arrhythmia [phone]      Goals (5 Years of Data)     None      Follow-Up and Disposition     Return in about 3 months (around 2017).      [facility]     [facility] -  Assistance  Registered nurses in the [facility] provide clinical advisement, health education, appointment booking, and other advisory services.  Call for this free service at [phone].             Medications           Message regarding Medications     Verify the changes and/or additions to your medication regime listed below are the same as discussed with your clinician today.  If any of these changes or additions are incorrect, please notify your healthcare provider.        STOP taking these medications     dexamethasone (DECADRON) 0.1 % DrpS 2 drops every 12 (twelve) hours.    ondansetron (ZOFRAN-ODT) 4 MG TbDL Take 1 tablet (4 mg total) by mouth every 8 (eight) hours as needed.    polyethylene glycol (GLYCOLAX) 17 gram/dose powder Take 17 g by mouth once daily.    promethazine  "(PHENERGAN) 25 MG tablet Take 1 tablet (25 mg total) by mouth every 4 (four) hours as needed for Nausea.           Verify that the below list of medications is an accurate representation of the medications you are currently taking.  If none reported, the list may be blank. If incorrect, please contact your healthcare provider. Carry this list with you in case of emergency.           Current Medications     amiodarone (PACERONE) 200 MG Tab Take 1.5 tablets (300 mg total) by mouth once daily.    aspirin (ECOTRIN) 325 MG EC tablet Take 325 mg by mouth once daily.    atorvastatin (LIPITOR) 80 MG tablet Take 1 tablet (80 mg total) by mouth once daily.    clopidogrel (PLAVIX) 75 mg tablet Take 75 mg by mouth once daily.    dexlansoprazole (DEXILANT) 60 mg capsule Take 60 mg by mouth once daily.    docusate sodium (COLACE) 50 MG capsule Take 1 capsule (50 mg total) by mouth once daily.    furosemide (LASIX) 40 MG tablet Take 1 tablet (40 mg total) by mouth once daily.    hydrocodone-acetaminophen 5-325mg (NORCO) 5-325 mg per tablet Take 1 tablet by mouth every 4 (four) hours as needed for Pain.    lisinopril (PRINIVIL,ZESTRIL) 5 MG tablet Take 1 tablet (5 mg total) by mouth once daily.    metoprolol succinate (TOPROL-XL) 50 MG 24 hr tablet TAKE 1 TABLET BY MOUTH ONCE DAILY    nitroGLYCERIN (NITROSTAT) 0.4 MG SL tablet Place 1 tablet (0.4 mg total) under the tongue every 5 (five) minutes as needed for Chest pain.    spironolactone (ALDACTONE) 25 MG tablet TAKE 1 TABLET(25 MG) BY MOUTH EVERY DAY           Clinical Reference Information           Vital Signs - Last Recorded  Most recent update: 2/2/2017  8:15 AM by Rose Monique MA    BP Pulse Ht Wt BMI    (!) 86/57 (!) 58 5' 7" (1.702 m) 107.7 kg (237 lb 7 oz) 37.19 kg/m2      Blood Pressure          Most Recent Value    BP  (!)  86/57      Allergies as of 2/2/2017     Iodine Containing Multivitamin    Keflex [Cephalexin]    Peaches [Peach (Prunus Persica)]    Shellfish " Containing Products    Tuberculin Shital Test Ppd    Fig Tree      Immunizations Administered on Date of Encounter - 2/2/2017     None      Orders Placed During Today's Visit     Future Labs/Procedures Expected by Expires    Comprehensive metabolic panel  As directed 2/2/2018    CPX STUDY  As directed 2/2/2018    Recurring Lab Work Interval Expires    Brain natriuretic peptide  Every 4 Weeks until 2/2/2018 2/2/2018

## 2017-02-14 ENCOUNTER — HOSPITAL ENCOUNTER (EMERGENCY)
Facility: HOSPITAL | Age: 67
Discharge: HOME | End: 2017-02-15
Attending: EMERGENCY MEDICINE
Payer: MEDICARE

## 2017-02-14 VITALS
RESPIRATION RATE: 17 BRPM | WEIGHT: 230 LBS | BODY MASS INDEX: 36.1 KG/M2 | OXYGEN SATURATION: 95 % | HEART RATE: 75 BPM | SYSTOLIC BLOOD PRESSURE: 125 MMHG | TEMPERATURE: 99 F | DIASTOLIC BLOOD PRESSURE: 66 MMHG | HEIGHT: 67 IN

## 2017-02-14 DIAGNOSIS — L02.414 ABSCESS OF ARM, LEFT: Primary | ICD-10-CM

## 2017-02-14 PROCEDURE — 93005 ELECTROCARDIOGRAM TRACING: CPT

## 2017-02-14 PROCEDURE — 99283 EMERGENCY DEPT VISIT LOW MDM: CPT | Mod: 25

## 2017-02-14 PROCEDURE — 93010 ELECTROCARDIOGRAM REPORT: CPT | Mod: ,,, | Performed by: INTERNAL MEDICINE

## 2017-02-14 PROCEDURE — 99284 EMERGENCY DEPT VISIT MOD MDM: CPT | Mod: ,,, | Performed by: PHYSICIAN ASSISTANT

## 2017-02-14 RX ORDER — SULFAMETHOXAZOLE AND TRIMETHOPRIM 800; 160 MG/1; MG/1
1 TABLET ORAL 2 TIMES DAILY
Qty: 14 TABLET | Refills: 0 | Status: SHIPPED | OUTPATIENT
Start: 2017-02-14 | End: 2017-02-16

## 2017-02-14 RX ORDER — SULFAMETHOXAZOLE AND TRIMETHOPRIM 800; 160 MG/1; MG/1
1 TABLET ORAL
Status: COMPLETED | OUTPATIENT
Start: 2017-02-15 | End: 2017-02-14

## 2017-02-14 RX ADMIN — SULFAMETHOXAZOLE AND TRIMETHOPRIM 1 TABLET: 800; 160 TABLET ORAL at 11:02

## 2017-02-14 NOTE — ED AVS SNAPSHOT
[facility]  [address]  [facility] [address]               Audrey Oneil JrFly   2017 11:18 PM   ED    Description:  Male : 1950   Department:  [facility]           Your Care was Coordinated By:     Provider Role From To    Peyman Burciaga III, MD Attending Provider 17 2340 --    Angella Fonseca PA-C Physician Assistant 17 --    Alondra Saunders MD Resident 17 2331      Reason for Visit     Abscess           Diagnoses this Visit        Comments    Abscess of arm, left    -  Primary       ED Disposition     ED Disposition Condition Comment    Discharge             To Do List           Follow-up Information     Follow up with Km Thomas MD. Schedule an appointment as soon as possible for a visit in 3 days.    Specialty:  Internal Medicine    Why:  For reevaluation    Contact information:    [address]  [facility] [address]  [phone]         These Medications        Disp Refills Start End    sulfamethoxazole-trimethoprim 800-160mg (BACTRIM DS) 800-160 mg Tab 14 tablet 0 2017    Take 1 tablet by mouth 2 (two) times daily. - Oral    [facility] [address] [facility] [facility] [place] [address] [address]  AT [facility] OF [facility] [address] Ph #: [phone]         [facility]     [facility] -  Assistance  Registered nurses in the [facility] provide clinical advisement, health education, appointment booking, and other advisory services.  Call for this free service at [phone].             Medications           Message regarding Medications     Verify the changes and/or additions to your medication regime listed below are the same as discussed with your clinician today.  If any of these changes or additions are incorrect, please notify your healthcare provider.        START taking these NEW medications        Refills     sulfamethoxazole-trimethoprim 800-160mg (BACTRIM DS) 800-160 mg Tab 0    Sig: Take 1 tablet by mouth 2 (two) times daily.    Class: Print    Route: Oral      These medications were administered today        Dose Freq    sulfamethoxazole-trimethoprim 800-160mg per tablet 1 tablet 1 tablet ED 1 Time    Starting on: 2/15/2017    Sig: Take 1 tablet by mouth ED 1 Time.    Class: Normal    Route: Oral    Cosign for Ordering: Required by Peyman Burciaga III, MD           Verify that the below list of medications is an accurate representation of the medications you are currently taking.  If none reported, the list may be blank. If incorrect, please contact your healthcare provider. Carry this list with you in case of emergency.           Current Medications     amiodarone (PACERONE) 200 MG Tab Take 1.5 tablets (300 mg total) by mouth once daily.    aspirin (ECOTRIN) 325 MG EC tablet Take 325 mg by mouth once daily.    atorvastatin (LIPITOR) 80 MG tablet Take 1 tablet (80 mg total) by mouth once daily.    clopidogrel (PLAVIX) 75 mg tablet Take 75 mg by mouth once daily.    dexlansoprazole (DEXILANT) 60 mg capsule Take 60 mg by mouth once daily.    docusate sodium (COLACE) 50 MG capsule Take 1 capsule (50 mg total) by mouth once daily.    furosemide (LASIX) 40 MG tablet Take 1 tablet (40 mg total) by mouth once daily.    hydrocodone-acetaminophen 5-325mg (NORCO) 5-325 mg per tablet Take 1 tablet by mouth every 4 (four) hours as needed for Pain.    lisinopril (PRINIVIL,ZESTRIL) 5 MG tablet Take 1 tablet (5 mg total) by mouth once daily.    metoprolol succinate (TOPROL-XL) 50 MG 24 hr tablet TAKE 1 TABLET BY MOUTH ONCE DAILY    nitroGLYCERIN (NITROSTAT) 0.4 MG SL tablet Place 1 tablet (0.4 mg total) under the tongue every 5 (five) minutes as needed for Chest pain.    spironolactone (ALDACTONE) 25 MG tablet TAKE 1 TABLET(25 MG) BY MOUTH EVERY DAY    sulfamethoxazole-trimethoprim 800-160mg (BACTRIM DS) 800-160 mg Tab Take 1 tablet  "by mouth 2 (two) times daily.    sulfamethoxazole-trimethoprim 800-160mg per tablet 1 tablet Starting on Feb 15, 2017. Take 1 tablet by mouth ED 1 Time.           Clinical Reference Information           Your Vitals Were     BP Pulse Temp Resp Height Weight    125/66 (BP Location: Right arm, Patient Position: Sitting) 75 98.5 °F (36.9 °C) (Oral) 17 5' 7" (1.702 m) 104.3 kg (230 lb)    SpO2 BMI             95% 36.02 kg/m2         Allergies as of 2/14/2017        Reactions    Iodine Containing Multivitamin     Keflex [Cephalexin]     Peaches [Peach (Prunus Persica)]     Shellfish Containing Products     Tuberculin Shital Test Ppd     Fig Tree Rash      Immunizations Administered on Date of Encounter - 2/14/2017     None      ED Micro, Lab, POCT     None      ED Imaging Orders     None        Discharge Instructions         Abscess (Antibiotic Treatment Only)  An abscess (sometimes called a boil) happens when bacteria get trapped under the skin and start to grow. Pus forms inside the abscess as the body responds to the bacteria. An abscess can happen with an insect bite, ingrown hair, blocked oil gland, pimple, cyst, or puncture wound.  In the early stages, your wound may be red and tender. For this stage, you may get antibiotics. If the abscess does not get better with antibiotics, it will need to be drained with a small cut.  Home care  These tips will help you care for your abscess at home:  · Soak the wound in hot water or apply hot packs (small towel soaked in hot water) to the area for 20 minutes at a time. Do this 3 to 4 times a day.  · Do not cut, squeeze, or pop the boil yourself.  · Apply antibiotic cream or ointment to the skin 3 to 4 times a day, unless something else was prescribed. Some ointments include an antibiotic plus a pain reliever.  · If your doctor prescribed antibiotics, do not stop taking them until you have finished the medicine or the doctor tells you to stop.  · You may use an " over-the-counter pain medicine to control pain, unless another pain medicine was prescribed. If you have chronic liver or kidney disease or ever had a stomach ulcer or gastrointestinal bleeding, talk with your doctor before using these any of these.  Follow-up care  Follow up with your healthcare provider, or as advised. Check your wound each day for the signs of worsening infection listed below.  When to seek medical advice  Get prompt medical attention if any of these occur:  · An increase in redness or swelling  · Red streaks in the skin leading away from the abscess  · An increase in local pain or swelling  · Fever of 100.4ºF (38ºC) or higher, or as directed by your healthcare provider  · Pus or fluid coming from the abscess  · Boil returns after getting better  Date Last Reviewed: 9/1/2016  © 1938-5431 [employer]. [address] [place] [address], [place] [address]. All rights reserved. This information is not intended as a substitute for professional medical care. Always follow your healthcare professional's instructions.          Your Scheduled Appointments     Feb 15, 2017  1:00 PM CST   CARDI PULM Stress Test with CPX   [facility] - Echo/Stress Lab ([facility] )    [address]  [place] [address] [phone]            Mar 13, 2017 11:40 AM CDT   Debrillator Tune Up with PACEMAKER, ICD   [facility] - Arrhythmia ([facility] )    [address]  [place] [address] [phone]              Smoking Cessation     If you would like to quit smoking:   You may be eligible for free services if you are a [place] resident and started smoking cigarettes before September 1, 1988.  Call the Smoking Cessation Trust (SCT) toll free at (438) 711-2582 or (077) 810-0492.   Call 4-800-QUIT-NOW if you do not meet the above criteria.             [facility] complies with applicable Federal civil rights laws and does not discriminate on the basis of race, color,  national origin, age, disability, or sex.        Language Assistance Services     ATTENTION: Language assistance services are available, free of charge. Please call [phone].      ATENCIÓN: Si habla gustavo, tiene a garcia disposición servicios gratuitos de asistencia lingüística. Llame al [phone].     CHÚ Ý: N?u b?n nói Ti?ng Vi?t, có các d?ch v? h? tr? ngôn ng? mi?n phí dành cho b?n. G?i s? [phone].

## 2017-02-15 ENCOUNTER — HOSPITAL ENCOUNTER (OUTPATIENT)
Dept: CARDIOLOGY | Facility: CLINIC | Age: 67
Discharge: HOME | End: 2017-02-15
Payer: MEDICARE

## 2017-02-15 ENCOUNTER — TELEPHONE (OUTPATIENT)
Dept: INTERNAL MEDICINE | Facility: CLINIC | Age: 67
End: 2017-02-15

## 2017-02-15 DIAGNOSIS — I50.42 CHRONIC COMBINED SYSTOLIC AND DIASTOLIC HEART FAILURE: Chronic | ICD-10-CM

## 2017-02-15 LAB — DIASTOLIC DYSFUNCTION: NO

## 2017-02-15 PROCEDURE — 94621 CARDIOPULM EXERCISE TESTING: CPT | Mod: S$GLB,,, | Performed by: INTERNAL MEDICINE

## 2017-02-15 PROCEDURE — 25000003 PHARM REV CODE 250: Performed by: PHYSICIAN ASSISTANT

## 2017-02-15 NOTE — ED PROVIDER NOTES
Encounter Date: 2/14/2017    SCRIBE #1 NOTE: I, Stone Stewart, am scribing for, and in the presence of,  Dr. Burciaga. I have scribed the following portions of the note - the EKG reading and the APC attestation.       History     Chief Complaint   Patient presents with    Abscess     Pt has abscess to left forearm. Reports clear drainage.     Review of patient's allergies indicates:   Allergen Reactions    Iodine containing multivitamin     Keflex [cephalexin]     Peaches [peach (prunus persica)]     Shellfish containing products     Tuberculin spenser test ppd     Fig tree Rash     HPI Comments: 66-year-old male with a PMH significant for CHF, MI, PE, DVT, CAD, HTN presents to the ED with a chief complaint of abscess.  Patient reports a small sore to the left forearm for the past couple of days.  He attempted treatment with applying Neosporin as well as applying hot compresses without improvement.  Patient also notes an episode of lightheadedness with associated shortness of breath earlier today that lasted only a second.  He denies chest pain, nausea, diaphoresis with the episode.  Patient also denies fever, chills.    The history is provided by the patient.     Past Medical History   Diagnosis Date    Chronic anticoagulation 5/5/2016    Chronic combined systolic and diastolic heart failure 11/26/2012     EF 10-20% on ECHO 2013    Clotting disorder     Coronary artery disease involving native coronary artery of native heart without angina pectoris 11/26/2012     Cath 10- Stents patent non-obstructive disease Cath 11-12015 non-obstructive disease     Diverticulosis of colon     DVT (deep venous thrombosis), unspecified laterality 11/12/2015    Essential hypertension 11/15/2015    Hyperlipidemia     Hypertensive heart disease with heart failure 5/5/2016    MI (myocardial infarction) 2009    Nicotine abuse     Obesity 11/26/2012    Pulmonary embolism 2011    Stented coronary artery 11/26/2012      LAD stent placed 10/17/2007      Past Medical History Pertinent Negatives   Diagnosis Date Noted    Diabetes mellitus, type 2 6/2/2014     Past Surgical History   Procedure Laterality Date    Back surgery      Appendectomy      Cardiac surgery       stent    Tonsillectomy      R knee scope       Family History   Problem Relation Age of Onset    Cancer Mother      colon cancer    Heart disease Mother     Heart disease Father     Stroke Father     Colon polyps Father     Cancer Paternal Grandmother      leukemia    Diabetes Neg Hx      Social History   Substance Use Topics    Smoking status: Former Smoker     Packs/day: 1.00     Years: 45.00     Types: Cigarettes     Quit date: 12/14/2015    Smokeless tobacco: Never Used      Comment: 1-1.5 ppd every day.    Alcohol use No     Review of Systems   Constitutional: Negative for chills and fever.   Respiratory: Positive for shortness of breath.    Cardiovascular: Negative for chest pain.   Skin: Positive for wound.   Neurological: Positive for light-headedness. Negative for syncope.       Physical Exam   Initial Vitals   BP Pulse Resp Temp SpO2   02/14/17 2022 02/14/17 2022 02/14/17 2022 02/14/17 2022 02/14/17 2022   125/66 75 17 98.5 °F (36.9 °C) 95 %     Physical Exam    Constitutional: He appears well-developed and well-nourished. He is not diaphoretic.  Non-toxic appearance. He does not appear ill. No distress.   HENT:   Head: Normocephalic and atraumatic.   Eyes: EOM are normal. Right eye exhibits no discharge. Left eye exhibits no discharge.   Neck: Normal range of motion and phonation normal. Neck supple.   Cardiovascular: Normal rate and regular rhythm. Exam reveals no gallop, no distant heart sounds and no friction rub.    No murmur heard.  No peripheral edema   Pulmonary/Chest: Effort normal and breath sounds normal. No accessory muscle usage. No tachypnea. No respiratory distress. He has no decreased breath sounds. He has no wheezes. He has no  rhonchi. He has no rales.   Abdominal: Soft. Normal appearance. He exhibits no distension.   Protuberant abdomen   Musculoskeletal: Normal range of motion.   Neurological: He is alert and oriented to person, place, and time.   Skin: Skin is warm and dry. No rash noted. No pallor.   There is a pustule noted to the ulnar L forearm with surrounding cellulitis.  Small amount of purulent drainage was expressed.  There is no induration or significant fluctuance noted.   Psychiatric: He has a normal mood and affect. His behavior is normal. Judgment and thought content normal.         ED Course   Procedures  Labs Reviewed - No data to display  EKG Readings: (Independently Interpreted)   Atrially paced, left axis deviation, no ST elevation or depression           Medical Decision Making:   History:   Old Medical Records: I decided to obtain old medical records.  Differential Diagnosis:   Abscess, cellulitis, ACS, anxiety, orthostatic hypotension  Independently Interpreted Test(s):   I have ordered and independently interpreted EKG Reading(s) - see prior notes  Clinical Tests:   Medical Tests: Ordered       APC / Resident Notes:   MDM:  66-year-old male presents for evaluation of an abscess.  Patient also complains of a brief episode of lightheadedness and shortness of breath that occurred earlier today.  His vitals are within normal limits.  He is well-appearing and in no acute distress.  There is a pustule noted to the ulnar L forearm with surrounding cellulitis.  Small amount of purulent drainage was expressed.  There is no induration or significant fluctuance noted.  The lungs are clear to auscultation bilaterally.  Cardiac exam is unremarkable.  There is no peripheral edema noted.    EKG reveals no acute ischemic changes or malignant arrhythmias.    I do not feel that further workup is indicated as patient is currently asymptomatic and his symptoms lasted only one second.  Patient also has stress test scheduled for  tomorrow.  I feel that he is stable for discharge with antibiotics to treat for cellulitis versus early abscess.  First dose of Bactrim given in the ED.  Patient advised to apply warm compresses to the area. PCP follow-up in 3-4 days for reevaluation of wound.  ED warnings and return precautions given.  I have reviewed the patient's records and discussed this case with my supervising physician.         Scribe Attestation:   Scribe #1: I performed the above scribed service and the documentation accurately describes the services I performed. I attest to the accuracy of the note.    Attending Attestation:     Physician Attestation Statement for NP/PA:   I discussed this assessment and plan of this patient with the NP/PA, but I did not personally examine the patient. The face to face encounter was performed by the NP/PA.    Other NP/PA Attestation Additions:    History of Present Illness: Abscess         Physician Attestation for Scribe:  Physician Attestation Statement for Scribe #1: I, Dr. Burciaga, reviewed documentation, as scribed by Stone Stewart in my presence, and it is both accurate and complete.                 ED Course     Clinical Impression:   The encounter diagnosis was Abscess of arm, left.    Disposition:   Disposition: Discharged  Condition: Stable           Angella Fonseca PA-C  02/15/17 0041       Peyman Burciaga III, MD  02/15/17 0230

## 2017-02-15 NOTE — TELEPHONE ENCOUNTER
Patient with recent ER visit; listed with me for primary care but has never established care. Please call patient to set up visit to establish care, or he can establish with any other provider.

## 2017-02-16 ENCOUNTER — HOSPITAL ENCOUNTER (EMERGENCY)
Facility: HOSPITAL | Age: 67
Discharge: HOME | End: 2017-02-16
Attending: EMERGENCY MEDICINE
Payer: MEDICARE

## 2017-02-16 VITALS
RESPIRATION RATE: 18 BRPM | DIASTOLIC BLOOD PRESSURE: 51 MMHG | OXYGEN SATURATION: 95 % | SYSTOLIC BLOOD PRESSURE: 109 MMHG | BODY MASS INDEX: 36.1 KG/M2 | WEIGHT: 230 LBS | HEART RATE: 62 BPM | TEMPERATURE: 98 F | HEIGHT: 67 IN

## 2017-02-16 DIAGNOSIS — L03.114 CELLULITIS OF ARM, LEFT: Primary | ICD-10-CM

## 2017-02-16 DIAGNOSIS — L02.91 ABSCESS: ICD-10-CM

## 2017-02-16 PROCEDURE — 25000003 PHARM REV CODE 250: Performed by: EMERGENCY MEDICINE

## 2017-02-16 PROCEDURE — 99283 EMERGENCY DEPT VISIT LOW MDM: CPT | Mod: 25,,, | Performed by: EMERGENCY MEDICINE

## 2017-02-16 PROCEDURE — 10060 I&D ABSCESS SIMPLE/SINGLE: CPT

## 2017-02-16 PROCEDURE — 99283 EMERGENCY DEPT VISIT LOW MDM: CPT | Mod: 25

## 2017-02-16 PROCEDURE — 10060 I&D ABSCESS SIMPLE/SINGLE: CPT | Mod: ,,, | Performed by: EMERGENCY MEDICINE

## 2017-02-16 RX ORDER — CLINDAMYCIN HYDROCHLORIDE 150 MG/1
300 CAPSULE ORAL 4 TIMES DAILY
Qty: 56 CAPSULE | Refills: 0 | Status: SHIPPED | OUTPATIENT
Start: 2017-02-16 | End: 2017-02-23

## 2017-02-16 RX ORDER — METOPROLOL SUCCINATE 50 MG/1
TABLET, EXTENDED RELEASE ORAL
Qty: 90 TABLET | Refills: 0 | Status: SHIPPED | OUTPATIENT
Start: 2017-02-16 | End: 2017-05-18 | Stop reason: SDUPTHER

## 2017-02-16 RX ORDER — LIDOCAINE HYDROCHLORIDE 10 MG/ML
10 INJECTION INFILTRATION; PERINEURAL ONCE
Status: COMPLETED | OUTPATIENT
Start: 2017-02-16 | End: 2017-02-16

## 2017-02-16 RX ADMIN — LIDOCAINE HYDROCHLORIDE 10 ML: 10 INJECTION, SOLUTION INFILTRATION; PERINEURAL at 06:02

## 2017-02-16 NOTE — ED TRIAGE NOTES
Audrey Oneil Jr., a 66 y.o. male presents to the ED complaining of an abscess to his left arm that is not getting any better.      Chief Complaint   Patient presents with    arm pain     Patient presently complains of having (L) arm pain. Patient reports that symptoms started several days ago.     Review of patient's allergies indicates:   Allergen Reactions    Iodine containing multivitamin     Keflex [cephalexin]     Peaches [peach (prunus persica)]     Shellfish containing products     Tuberculin spenser test ppd     Fig tree Rash     Past Medical History   Diagnosis Date    Chronic anticoagulation 5/5/2016    Chronic combined systolic and diastolic heart failure 11/26/2012     EF 10-20% on ECHO 2013    Clotting disorder     Coronary artery disease involving native coronary artery of native heart without angina pectoris 11/26/2012     Cath 10- Stents patent non-obstructive disease Cath 11-12015 non-obstructive disease     Diverticulosis of colon     DVT (deep venous thrombosis), unspecified laterality 11/12/2015    Essential hypertension 11/15/2015    Hyperlipidemia     Hypertensive heart disease with heart failure 5/5/2016    MI (myocardial infarction) 2009    Nicotine abuse     Obesity 11/26/2012    Pulmonary embolism 2011    Stented coronary artery 11/26/2012     LAD stent placed 10/17/2007      Adult Physical Assessment  LOC: Audrey Oneil Jr., 66 y.o. male verified via two identifiers.  The patient is awake, alert, oriented and speaking appropriately at this time.  APPEARANCE: Patient resting comfortably and appears to be in no acute distress at this time. Patient is clean and well groomed, patient's clothing is properly fastened.  SKIN:The skin is warm and dry, color consistent with ethnicity, patient has normal skin turgor and moist mucus membranes, skin intact, no breakdown or brusing noted.  MUSCULOSKELETAL: Patient moving all extremities well, no obvious swelling or  deformities noted.  RESPIRATORY: Airway is open and patent, respirations are spontaneous, patient has a normal effort and rate, no accessory muscle use noted.  CARDIAC: Patient has a normal rate and rhythm, no periphreal edema noted in any extremity, capillary refill < 3 seconds in all extremities  ABDOMEN: Soft and non tender to palpation, no abdominal distention noted. Bowel sounds present in all four quadrants.  NEUROLOGIC: Eyes open spontaneously, behavior appropriate to situation, follows commands, facial expression symmetrical, bilateral hand grasp equal and even, purposeful motor response noted, normal sensation in all extremities when touched with a finger.

## 2017-02-16 NOTE — DISCHARGE INSTRUCTIONS
Abscess (Incision & Drainage)  An abscess (sometimes called a boil) occurs when bacteria get trapped under the skin and start to grow. Pus forms inside the abscess as the body responds to the bacteria. An abscess can happen with an insect bite, ingrown hair, blocked oil gland, pimple, cyst, or puncture wound.  Your healthcare provider has drained the pus from your abscess. If the abscess pocket was large, your healthcare provider may have inserted gauze packing. Your provider will need to remove and possibly replace it on your next visit. You may not need antibiotics to treat a simple abscess, unless the infection is spreading into the skin around the wound (cellulitis).  Healing of the wound will take about 1 to 2 weeks, depending on the size of the abscess. Healthy tissue will grow from the bottom and sides of the opening until it seals over.  Home care  These tips can help your wound heal:  · The wound may drain for the first 2 days. Cover the wound with a clean dry dressing. If the dressing becomes soaked with blood or pus, change it.  · If a gauze packing was placed inside the abscess cavity, you may be told to remove it yourself. You may do this in the shower. Once the packing is removed, you should wash the area in the shower or bath 3 to 4 times a day, until the skin opening has closed. Make sure you wash your hands after changing the packing or cleaning the wound.  · If you were prescribed antibiotics, take them as directed until they are all gone.  · You may use acetaminophen or ibuprofen to control pain, unless another pain medicine was prescribed. If you have liver disease or ever had a stomach ulcer, talk with your doctor before using these medicines.  Follow-up care  Follow up with your healthcare provider, or as advised. If a gauze packing was inserted in your wound, it should be removed in 1 to 2 days. Check your wound every day for the signs of worsening infection listed below.  When to seek  medical advice  Call your healthcare provider right away if any of these occur:  · Increasing redness or swelling  · Red streaks in the skin leading away from the wound  · Increasing local pain or swelling  · Continued pus draining from the wound 2 days after treatment  · Fever of 100.4ºF (38ºC) or higher, or as directed by your healthcare provider  · Boil returns when you are at home  Date Last Reviewed: 9/1/2016  © 9949-6337 [employer]. [address] [place] [address]. All rights reserved. This information is not intended as a substitute for professional medical care. Always follow your healthcare professional's instructions.

## 2017-02-16 NOTE — ED AVS SNAPSHOT
[facility]  [address]  [facility] [address]               Audrey Oneil JrFly   2017  4:54 AM   ED    Description:  Male : 1950   Department:  [facility]           Your Care was Coordinated By:     Provider Role From To    Peyman Burciaga III, MD Attending Provider 17 [phone] --    Alondra Saunders MD Resident 17 [phone] --      Reason for Visit     arm pain           Diagnoses this Visit        Comments    Cellulitis of arm, left    -  Primary     Abscess           ED Disposition     None           To Do List           Follow-up Information     Follow up with [facility].    Specialty:  Emergency Medicine    Why:  If symptoms worsen. If redness continues to expand    Contact information:    [address] [facility] [id number] [phone]       These Medications        Disp Refills Start End    clindamycin (CLEOCIN) 150 MG capsule 56 capsule 0 2017    Take 2 capsules (300 mg total) by mouth 4 (four) times daily. - Oral    Pharmacy: [facility] [address] [place] [address] [address] AT [facility] OF [facility] & [address] Ph #: [phone]         [facility]     [facility] Nurse Care [facility] -  Assistance  Registered nurses in the [facility] provide clinical advisement, health education, appointment booking, and other advisory services.  Call for this free service at [phone].             Medications           Message regarding Medications     Verify the changes and/or additions to your medication regime listed below are the same as discussed with your clinician today.  If any of these changes or additions are incorrect, please notify your healthcare provider.        START taking these NEW medications        Refills    clindamycin (CLEOCIN) 150 MG capsule 0    Sig: Take 2 capsules (300 mg total) by mouth 4 (four) times daily.    Class: Print     Route: Oral      These medications were administered today        Dose Freq    lidocaine HCL 10 mg/ml (1%) injection 10 mL 10 mL Once    Sig: 10 mLs by Other route once.    Class: Normal    Route: Other      STOP taking these medications     sulfamethoxazole-trimethoprim 800-160mg (BACTRIM DS) 800-160 mg Tab Take 1 tablet by mouth 2 (two) times daily.           Verify that the below list of medications is an accurate representation of the medications you are currently taking.  If none reported, the list may be blank. If incorrect, please contact your healthcare provider. Carry this list with you in case of emergency.           Current Medications     amiodarone (PACERONE) 200 MG Tab Take 1.5 tablets (300 mg total) by mouth once daily.    aspirin (ECOTRIN) 325 MG EC tablet Take 325 mg by mouth once daily.    atorvastatin (LIPITOR) 80 MG tablet Take 1 tablet (80 mg total) by mouth once daily.    clindamycin (CLEOCIN) 150 MG capsule Take 2 capsules (300 mg total) by mouth 4 (four) times daily.    clopidogrel (PLAVIX) 75 mg tablet Take 75 mg by mouth once daily.    dexlansoprazole (DEXILANT) 60 mg capsule Take 60 mg by mouth once daily.    docusate sodium (COLACE) 50 MG capsule Take 1 capsule (50 mg total) by mouth once daily.    furosemide (LASIX) 40 MG tablet Take 1 tablet (40 mg total) by mouth once daily.    hydrocodone-acetaminophen 5-325mg (NORCO) 5-325 mg per tablet Take 1 tablet by mouth every 4 (four) hours as needed for Pain.    lidocaine HCL 10 mg/ml (1%) injection 10 mL 10 mLs by Other route once.    lisinopril (PRINIVIL,ZESTRIL) 5 MG tablet Take 1 tablet (5 mg total) by mouth once daily.    metoprolol succinate (TOPROL-XL) 50 MG 24 hr tablet TAKE 1 TABLET BY MOUTH ONCE DAILY    nitroGLYCERIN (NITROSTAT) 0.4 MG SL tablet Place 1 tablet (0.4 mg total) under the tongue every 5 (five) minutes as needed for Chest pain.    spironolactone (ALDACTONE) 25 MG tablet TAKE 1 TABLET(25 MG) BY MOUTH EVERY DAY          "  Clinical Reference Information           Your Vitals Were     BP Pulse Temp Resp Height Weight    109/51 (BP Location: Right arm, Patient Position: Sitting) 62 98 °F (36.7 °C) (Oral) 18 5' 7" (1.702 m) 104.3 kg (230 lb)    SpO2 BMI             95% 36.02 kg/m2         Allergies as of 2/16/2017        Reactions    Iodine Containing Multivitamin     Keflex [Cephalexin]     Peaches [Peach (Prunus Persica)]     Shellfish Containing Products     Tuberculin Shital Test Ppd     Fig Tree Rash      Immunizations Administered on Date of Encounter - 2/16/2017     None      ED Micro, Lab, POCT     None      ED Imaging Orders     None        Discharge Instructions         Abscess (Incision & Drainage)  An abscess (sometimes called a boil) occurs when bacteria get trapped under the skin and start to grow. Pus forms inside the abscess as the body responds to the bacteria. An abscess can happen with an insect bite, ingrown hair, blocked oil gland, pimple, cyst, or puncture wound.  Your healthcare provider has drained the pus from your abscess. If the abscess pocket was large, your healthcare provider may have inserted gauze packing. Your provider will need to remove and possibly replace it on your next visit. You may not need antibiotics to treat a simple abscess, unless the infection is spreading into the skin around the wound (cellulitis).  Healing of the wound will take about 1 to 2 weeks, depending on the size of the abscess. Healthy tissue will grow from the bottom and sides of the opening until it seals over.  Home care  These tips can help your wound heal:  · The wound may drain for the first 2 days. Cover the wound with a clean dry dressing. If the dressing becomes soaked with blood or pus, change it.  · If a gauze packing was placed inside the abscess cavity, you may be told to remove it yourself. You may do this in the shower. Once the packing is removed, you should wash the area in the shower or bath 3 to 4 times a day, " until the skin opening has closed. Make sure you wash your hands after changing the packing or cleaning the wound.  · If you were prescribed antibiotics, take them as directed until they are all gone.  · You may use acetaminophen or ibuprofen to control pain, unless another pain medicine was prescribed. If you have liver disease or ever had a stomach ulcer, talk with your doctor before using these medicines.  Follow-up care  Follow up with your healthcare provider, or as advised. If a gauze packing was inserted in your wound, it should be removed in 1 to 2 days. Check your wound every day for the signs of worsening infection listed below.  When to seek medical advice  Call your healthcare provider right away if any of these occur:  · Increasing redness or swelling  · Red streaks in the skin leading away from the wound  · Increasing local pain or swelling  · Continued pus draining from the wound 2 days after treatment  · Fever of 100.4ºF (38ºC) or higher, or as directed by your healthcare provider  · Boil returns when you are at home  Date Last Reviewed: 9/1/2016 © 2000-2016 [employer]. [address] [place] [address]. All rights reserved. This information is not intended as a substitute for professional medical care. Always follow your healthcare professional's instructions.          Your Scheduled Appointments     Mar 13, 2017 11:40 AM CDT   Debrillator Tune Up with PACEMAKER, ICD   [facility] ([address] )    [address]  [facility] [address] [phone]              Smoking Cessation     If you would like to quit smoking:   You may be eligible for free services if you are a [place] resident and started smoking cigarettes before September 1, 1988.  Call the Smoking Cessation Trust (SCT) toll free at (598) 194-8706 or (080) 289-3519.   Call 7-800-QUIT-NOW if you do not meet the above criteria.             [facility] complies with  applicable Federal civil rights laws and does not discriminate on the basis of race, color, national origin, age, disability, or sex.        Language Assistance Services     ATTENTION: Language assistance services are available, free of charge. Please call [phone].      ATENCIÓN: Si habla gustavo, tiene a garcia disposición servicios gratuitos de asistencia lingüística. Llame al [phone].     CHÚ Ý: N?u b?n nói Ti?ng Vi?t, có các d?ch v? h? tr? ngôn ng? mi?n phí dành cho b?n. G?i s? [phone].

## 2017-02-16 NOTE — ED PROVIDER NOTES
Encounter Date: 2/16/2017    SCRIBE #1 NOTE: I, Kenia Fields, am scribing for, and in the presence of,  Dr. Burciaga. I have scribed the following portions of the note - the Resident attestation.       History     Chief Complaint   Patient presents with    arm pain     Patient presently complains of having (L) arm pain. Patient reports that symptoms started several days ago.     Review of patient's allergies indicates:   Allergen Reactions    Iodine containing multivitamin     Keflex [cephalexin]     Peaches [peach (prunus persica)]     Shellfish containing products     Tuberculin spenser test ppd     Fig tree Rash     HPI Comments: 66 y.o. M c/o skin infection of L arm. Patient was seen yesterday, diagnosed w cellulitis, discharged on bactrim and told to return to ED if area of redness expanded. No I&D was performed as it was judged not to contain abscess. Patient has taken 2 doses of bactrim but notes that the redness has expanded and there is now streaking redness proximally. He is able to move all joint of the extremity w/o difficulty. He denies fever or N/V.      Past Medical History   Diagnosis Date    Chronic anticoagulation 5/5/2016    Chronic combined systolic and diastolic heart failure 11/26/2012     EF 10-20% on ECHO 2013    Clotting disorder     Coronary artery disease involving native coronary artery of native heart without angina pectoris 11/26/2012     Cath 10- Stents patent non-obstructive disease Cath 11-12015 non-obstructive disease     Diverticulosis of colon     DVT (deep venous thrombosis), unspecified laterality 11/12/2015    Essential hypertension 11/15/2015    Hyperlipidemia     Hypertensive heart disease with heart failure 5/5/2016    MI (myocardial infarction) 2009    Nicotine abuse     Obesity 11/26/2012    Pulmonary embolism 2011    Stented coronary artery 11/26/2012     LAD stent placed 10/17/2007      Past Medical History Pertinent Negatives   Diagnosis Date Noted     Diabetes mellitus, type 2 6/2/2014     Past Surgical History   Procedure Laterality Date    Back surgery      Appendectomy      Cardiac surgery       stent    Tonsillectomy      R knee scope       Family History   Problem Relation Age of Onset    Cancer Mother      colon cancer    Heart disease Mother     Heart disease Father     Stroke Father     Colon polyps Father     Cancer Paternal Grandmother      leukemia    Diabetes Neg Hx      Social History   Substance Use Topics    Smoking status: Former Smoker     Packs/day: 1.00     Years: 45.00     Types: Cigarettes     Quit date: 12/14/2015    Smokeless tobacco: Never Used      Comment: 1-1.5 ppd every day.    Alcohol use No     Review of Systems   Constitutional: Negative for chills and fever.   Gastrointestinal: Negative for nausea and vomiting.   Musculoskeletal: Negative for joint swelling and myalgias.   Skin: Positive for rash. Negative for wound.   Allergic/Immunologic: Negative for immunocompromised state.   Neurological: Negative for weakness and numbness.       Physical Exam   Initial Vitals   BP Pulse Resp Temp SpO2   02/16/17 0436 02/16/17 0436 02/16/17 0436 02/16/17 0436 02/16/17 0436   109/51 62 18 98 °F (36.7 °C) 95 %     Physical Exam    Constitutional: He appears well-developed and well-nourished.   HENT:   Head: Normocephalic and atraumatic.   Eyes: EOM are normal.   Cardiovascular: Normal rate.   Pulmonary/Chest: No respiratory distress.   Abdominal: He exhibits no distension.   Musculoskeletal: He exhibits no edema or tenderness.        Arms:  Neurological: He is alert.   Skin: Skin is warm and dry. Abscess noted. There is erythema. No pallor.         ED Course   I & D - Incision and Drainage  Date/Time: 2/16/2017 5:50 AM  Performed by: JOSE LUIS GARZA  Authorized by: LALI CHAIREZ III   Type: abscess  Body area: upper extremity  Location details: left arm  Anesthesia: local infiltration    Anesthesia:  Anesthesia: local  infiltration  Local Anesthetic: lidocaine 1% without epinephrine   Scalpel size: 11  Incision type: single straight  Complexity: simple  Drainage: bloody and  pus  Drainage amount: scant  Wound treatment: incision  Patient tolerance: Patient tolerated the procedure well with no immediate complications        Labs Reviewed - No data to display          Medical Decision Making:   History:   Old Medical Records: I decided to obtain old medical records.       APC / Resident Notes:   66 y.o. M w cellulitis and abscess of L forearm which has expanded despite bactrim x2 doses. I&D was performed but scant pus was returned. Patient abx changed to clindamycin. Instructed to return if symptoms continue to expand.    Alondra Saunders MD           Scribe Attestation:   Scribe #1: I performed the above scribed service and the documentation accurately describes the services I performed. I attest to the accuracy of the note.    Attending Attestation:   Physician Attestation Statement for Resident:  As the supervising MD   Physician Attestation Statement: I have personally seen and examined this patient.   I agree with the above history. -: Patient presents with arm cellulitis/abscess.    As the supervising MD I agree with the above PE.    As the supervising MD I agree with the above treatment, course, plan, and disposition.  I was personally present during the critical portions of the procedure(s) performed by the resident and was immediately available in the ED to provide services and assistance as needed during the entire procedure.          Physician Attestation for Scribe:  Physician Attestation Statement for Scribe #1: I, Dr. Burciaga, reviewed documentation, as scribed by Kenia Fields in my presence, and it is both accurate and complete.                 ED Course     Clinical Impression:   The primary encounter diagnosis was Cellulitis of arm, left. A diagnosis of Abscess was also pertinent to this visit.          Alondra Saunders,  MD  Resident  02/16/17 0555       Peyman Burciaga III, MD  02/16/17 0557

## 2017-02-18 ENCOUNTER — OFFICE VISIT (OUTPATIENT)
Dept: INTERNAL MEDICINE | Facility: CLINIC | Age: 67
End: 2017-02-18
Payer: MEDICARE

## 2017-02-18 VITALS
TEMPERATURE: 98 F | DIASTOLIC BLOOD PRESSURE: 64 MMHG | BODY MASS INDEX: 37.16 KG/M2 | SYSTOLIC BLOOD PRESSURE: 118 MMHG | WEIGHT: 236.75 LBS | OXYGEN SATURATION: 97 % | HEART RATE: 65 BPM | HEIGHT: 67 IN

## 2017-02-18 DIAGNOSIS — L02.414 ABSCESS OF LEFT ARM: Primary | ICD-10-CM

## 2017-02-18 PROCEDURE — 99999 PR PBB SHADOW E&M-EST. PATIENT-LVL III: CPT | Mod: PBBFAC,,, | Performed by: INTERNAL MEDICINE

## 2017-02-18 PROCEDURE — 3078F DIAST BP <80 MM HG: CPT | Mod: S$GLB,,, | Performed by: INTERNAL MEDICINE

## 2017-02-18 PROCEDURE — 3074F SYST BP LT 130 MM HG: CPT | Mod: S$GLB,,, | Performed by: INTERNAL MEDICINE

## 2017-02-18 PROCEDURE — 99214 OFFICE O/P EST MOD 30 MIN: CPT | Mod: S$GLB,,, | Performed by: INTERNAL MEDICINE

## 2017-02-18 RX ORDER — MUPIROCIN 20 MG/G
OINTMENT TOPICAL 3 TIMES DAILY
Qty: 30 G | Refills: 1 | Status: SHIPPED | OUTPATIENT
Start: 2017-02-18 | End: 2017-02-28

## 2017-02-18 NOTE — PROGRESS NOTES
Subjective:       Patient ID: Audrey Oneil Jr. is a 66 y.o. male.    Chief Complaint: Wound Check    HPI     CC: ED visit -     66 y.o.male presents today in f/u to ED visit  ED dx: Cellulitis  Presenting sx:  Patient was originally seen for an abscess of his arm in the emergency department.  He received Bactrim DS twice a day times 7 days.  This treatment apparently failed, because patient presented to days later had an I&D and clindamycin prescribed.  Tests done:   Lab: None done.   Imaging: No x-rays done.  Disposition:  Discharge clindamycin 300 mg 4 times a day.    Post ED:  He has removed the bandage twice so far.  He was in bad pain last night.  His arm was as hot as a firecracker.  He has been taking his pain medication.  He takes vicodin 7.5mg for his back.  He reports that the pain has let off.    Review of Systems    Objective:      Physical Exam   Constitutional: He is oriented to person, place, and time. He appears well-developed and well-nourished.   HENT:   Head: Normocephalic and atraumatic.   Mouth/Throat: No oropharyngeal exudate.   Eyes: EOM are normal. Pupils are equal, round, and reactive to light. Right eye exhibits no discharge. Left eye exhibits no discharge. No scleral icterus.   Neck: Normal range of motion. Neck supple. No tracheal deviation present. No thyromegaly present.   Cardiovascular: Normal rate, regular rhythm and normal heart sounds.  Exam reveals no gallop and no friction rub.    No murmur heard.  Pulmonary/Chest: Effort normal and breath sounds normal. No respiratory distress. He has no wheezes. He has no rales. He exhibits no tenderness.   Abdominal: Soft. Bowel sounds are normal. He exhibits no distension and no mass. There is no tenderness. There is no rebound and no guarding.   Musculoskeletal: Normal range of motion. He exhibits no edema or tenderness.   Neurological: He is alert and oriented to person, place, and time.   Skin: Skin is warm and dry. No rash noted. No  erythema. No pallor.        Psychiatric: He has a normal mood and affect. His behavior is normal.   Vitals reviewed.      Assessment:       1. Abscess of left arm        Plan:       1.  Continue clindamycin.  Patient advised to monitor size of wound.  If this worsens, advised to call back for additional I&D.

## 2017-02-18 NOTE — MR AVS SNAPSHOT
[facility]  [address]  [facility] [address]  Phone: [phone]  Fax: [phone]                  Audrey Oneil Jr.   2017 9:00 AM   Office Visit    Description:  Male : 1950   Provider:  Dennys Santo MD   Department:  [facility]           Reason for Visit     Wound Check           Diagnoses this Visit        Comments    Abscess of left arm    -  Primary            To Do List           Future Appointments        Provider Department Dept Phone    2017 9:00 AM MD [facility] [phone]    3/13/2017 11:40 AM PACEMAKER, ICD [facility] [phone]      Goals (5 Years of Data)     None       These Medications        Disp Refills Start End    mupirocin (BACTROBAN) 2 % ointment 30 g 1 2017    Apply topically 3 (three) times daily. - Topical (Top)    Pharmacy: [email or web address] [address] [place] [address] [address] AT [facility] OF [facility] & [address] Ph #: [phone]         [facility] On Call     [facility] On Call Nurse Care Line -  Assistance  Registered nurses in the [facility] On Call Center provide clinical advisement, health education, appointment booking, and other advisory services.  Call for this free service at [phone].             Medications           Message regarding Medications     Verify the changes and/or additions to your medication regime listed below are the same as discussed with your clinician today.  If any of these changes or additions are incorrect, please notify your healthcare provider.        START taking these NEW medications        Refills    mupirocin (BACTROBAN) 2 % ointment 1    Sig: Apply topically 3 (three) times daily.    Class: Normal    Route: Topical (Top)           Verify that the below list of medications is an accurate representation of the medications you are currently taking.  If none reported, the list may be blank. If incorrect, please  "contact your healthcare provider. Carry this list with you in case of emergency.           Current Medications     amiodarone (PACERONE) 200 MG Tab Take 1.5 tablets (300 mg total) by mouth once daily.    aspirin (ECOTRIN) 325 MG EC tablet Take 325 mg by mouth once daily.    atorvastatin (LIPITOR) 80 MG tablet Take 1 tablet (80 mg total) by mouth once daily.    clindamycin (CLEOCIN) 150 MG capsule Take 2 capsules (300 mg total) by mouth 4 (four) times daily.    clopidogrel (PLAVIX) 75 mg tablet Take 75 mg by mouth once daily.    dexlansoprazole (DEXILANT) 60 mg capsule Take 60 mg by mouth once daily.    hydrocodone-acetaminophen 5-325mg (NORCO) 5-325 mg per tablet Take 1 tablet by mouth every 4 (four) hours as needed for Pain.    lisinopril (PRINIVIL,ZESTRIL) 5 MG tablet Take 1 tablet (5 mg total) by mouth once daily.    metoprolol succinate (TOPROL-XL) 50 MG 24 hr tablet TAKE 1 TABLET BY MOUTH ONCE DAILY    spironolactone (ALDACTONE) 25 MG tablet TAKE 1 TABLET(25 MG) BY MOUTH EVERY DAY    docusate sodium (COLACE) 50 MG capsule Take 1 capsule (50 mg total) by mouth once daily.    furosemide (LASIX) 40 MG tablet Take 1 tablet (40 mg total) by mouth once daily.    mupirocin (BACTROBAN) 2 % ointment Apply topically 3 (three) times daily.    nitroGLYCERIN (NITROSTAT) 0.4 MG SL tablet Place 1 tablet (0.4 mg total) under the tongue every 5 (five) minutes as needed for Chest pain.           Clinical Reference Information           Your Vitals Were     BP Pulse Temp Height Weight SpO2    118/64 65 97.6 °F (36.4 °C) (Oral) 5' 7" (1.702 m) 107.4 kg (236 lb 12.4 oz) 97%    BMI                37.08 kg/m2          Blood Pressure          Most Recent Value    BP  118/64      Allergies as of 2/18/2017     Iodine Containing Multivitamin    Keflex [Cephalexin]    Peaches [Peach (Prunus Persica)]    Shellfish Containing Products    Tuberculin Shital Test Ppd    Fig Tree      Immunizations Administered on Date of Encounter - 2/18/2017  "    None      [facility]      [phone]  Dennys Santo MD  [address] [facility]  [place] [address]         Language Assistance Services     ATTENTION: Language assistance services are available, free of charge. Please call [phone].      ATENCIÓN: Si habla lbañol, tiene a garcia disposición servicios gratuitos de asistencia lingüística. Llame al [phone].     CHÚ Ý: N?u b?n nói Ti?ng Vi?t, có các d?ch v? h? tr? ngôn ng? mi?n phí dành cho b?n. G?i s? [phone].         [facility] complies with applicable Federal civil rights laws and does not discriminate on the basis of race, color, national origin, age, disability, or sex.

## 2017-03-02 RX ORDER — FUROSEMIDE 40 MG/1
TABLET ORAL
Qty: 90 TABLET | Refills: 0 | Status: SHIPPED | OUTPATIENT
Start: 2017-03-02 | End: 2017-06-12 | Stop reason: SDUPTHER

## 2017-03-02 RX ORDER — FUROSEMIDE 40 MG/1
TABLET ORAL
Qty: 30 TABLET | Refills: 0 | Status: SHIPPED | OUTPATIENT
Start: 2017-03-02 | End: 2017-03-02 | Stop reason: SDUPTHER

## 2017-03-17 RX ORDER — ATORVASTATIN CALCIUM 80 MG/1
TABLET, FILM COATED ORAL
Qty: 90 TABLET | Refills: 0 | Status: SHIPPED | OUTPATIENT
Start: 2017-03-17 | End: 2018-04-19 | Stop reason: SDUPTHER

## 2017-03-22 RX ORDER — ATORVASTATIN CALCIUM 80 MG/1
TABLET, FILM COATED ORAL
Qty: 90 TABLET | Refills: 0 | Status: SHIPPED | OUTPATIENT
Start: 2017-03-22 | End: 2017-05-05

## 2017-03-27 ENCOUNTER — TELEPHONE (OUTPATIENT)
Dept: TRANSPLANT | Facility: CLINIC | Age: 67
End: 2017-03-27

## 2017-03-27 DIAGNOSIS — I50.42 CHRONIC COMBINED SYSTOLIC AND DIASTOLIC HEART FAILURE: Primary | ICD-10-CM

## 2017-03-27 NOTE — TELEPHONE ENCOUNTER
"3/27/17 1030 Patient arrived to clinic today to discuss his heart failure options. He is wanting to move forward with a "heart pump" that was discussed with some time ago with Dr. Simms. Advised patient that I would have to discuss his treatment plan with one of the physicians and see if he would even qualify for this type of device and schedule for appointment to come in to  to discuss his options at length with the team. He verbalized understanding. Discussed patient's chart with Dr. Edison Marshall as patient had his last CPX as recommended on 2/15/17 and ECHO 11/1/16. RBVO Dr. Edison Marshall/ Judith Vidales LPN Appointment to Dr. Marshall on 3/30 @ 9 am with CMP/ BNP prior Move patient Preht section. Appointments scheduled per Dr. Marshall's orders. Notified patient of appointments.   "

## 2017-03-28 ENCOUNTER — CLINICAL SUPPORT (OUTPATIENT)
Dept: ELECTROPHYSIOLOGY | Facility: CLINIC | Age: 67
End: 2017-03-28
Payer: MEDICARE

## 2017-03-28 ENCOUNTER — TELEPHONE (OUTPATIENT)
Dept: TRANSPLANT | Facility: CLINIC | Age: 67
End: 2017-03-28

## 2017-03-28 DIAGNOSIS — Z76.82 ORGAN TRANSPLANT CANDIDATE: Primary | ICD-10-CM

## 2017-03-28 DIAGNOSIS — Z95.810 PRESENCE OF AUTOMATIC CARDIOVERTER/DEFIBRILLATOR (AICD): ICD-10-CM

## 2017-03-28 DIAGNOSIS — I25.5 CARDIOMYOPATHY, ISCHEMIC: ICD-10-CM

## 2017-03-28 PROCEDURE — 93283 PRGRMG EVAL IMPLANTABLE DFB: CPT | Mod: S$GLB,,, | Performed by: INTERNAL MEDICINE

## 2017-03-28 NOTE — TELEPHONE ENCOUNTER
"An appointment for this patient has been scheduled by the CHF clinic as the patient presented to the lobby requesting " I want that heart pump".  "

## 2017-03-29 ENCOUNTER — HOSPITAL ENCOUNTER (EMERGENCY)
Facility: HOSPITAL | Age: 67
Discharge: HOME | End: 2017-03-29
Attending: EMERGENCY MEDICINE
Payer: MEDICARE

## 2017-03-29 VITALS
DIASTOLIC BLOOD PRESSURE: 59 MMHG | SYSTOLIC BLOOD PRESSURE: 111 MMHG | WEIGHT: 225 LBS | OXYGEN SATURATION: 95 % | BODY MASS INDEX: 35.24 KG/M2 | RESPIRATION RATE: 18 BRPM | HEART RATE: 66 BPM | TEMPERATURE: 98 F

## 2017-03-29 DIAGNOSIS — M79.673 FOOT PAIN: ICD-10-CM

## 2017-03-29 DIAGNOSIS — S90.31XA CONTUSION OF RIGHT FOOT, INITIAL ENCOUNTER: Primary | ICD-10-CM

## 2017-03-29 PROCEDURE — 25000003 PHARM REV CODE 250: Performed by: EMERGENCY MEDICINE

## 2017-03-29 PROCEDURE — 99283 EMERGENCY DEPT VISIT LOW MDM: CPT | Mod: NTX,,, | Performed by: EMERGENCY MEDICINE

## 2017-03-29 PROCEDURE — 99283 EMERGENCY DEPT VISIT LOW MDM: CPT

## 2017-03-29 RX ORDER — ACETAMINOPHEN 500 MG
1000 TABLET ORAL
Status: COMPLETED | OUTPATIENT
Start: 2017-03-29 | End: 2017-03-29

## 2017-03-29 RX ADMIN — ACETAMINOPHEN 1000 MG: 500 TABLET ORAL at 10:03
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
## 2017-03-29 NOTE — ED PROVIDER NOTES
Encounter Date: 3/29/2017    SCRIBE #1 NOTE: I, Swapnil Cardoza, am scribing for, and in the presence of, Dr. Arrieta .       History     Chief Complaint   Patient presents with    Foot Injury     Right foot pain, injured yesterday. Son jumped on foot.      Review of patient's allergies indicates:   Allergen Reactions    Iodine containing multivitamin     Keflex [cephalexin]     Peaches [peach (prunus persica)]     Shellfish containing products     Tuberculin spenser test ppd     Fig tree Rash     HPI Comments: Time seen by provider: 9:59 AM    This is a 66 y.o. male who presents with complaint of right foot pain after foot injury. Pt states that his kid had rolled over his right foot with a scooter. This happened yesterday. Pain has been increasing since yesterday. He has no other complaints at this time.     The history is provided by the patient.     Past Medical History:   Diagnosis Date    Chronic anticoagulation 5/5/2016    Chronic combined systolic and diastolic heart failure 11/26/2012    EF 10-20% on ECHO 2013    Clotting disorder     Coronary artery disease involving native coronary artery of native heart without angina pectoris 11/26/2012    Cath 10- Stents patent non-obstructive disease Cath 11-12015 non-obstructive disease     Diverticulosis of colon     DVT (deep venous thrombosis), unspecified laterality 11/12/2015    Essential hypertension 11/15/2015    Hyperlipidemia     Hypertensive heart disease with heart failure 5/5/2016    MI (myocardial infarction) 2009    Nicotine abuse     Obesity 11/26/2012    Pulmonary embolism 2011    Stented coronary artery 11/26/2012    LAD stent placed 10/17/2007      Past Surgical History:   Procedure Laterality Date    APPENDECTOMY      BACK SURGERY      CARDIAC SURGERY      stent    r knee scope      TONSILLECTOMY       Family History   Problem Relation Age of Onset    Cancer Mother      colon cancer    Heart disease Mother     Heart  disease Father     Stroke Father     Colon polyps Father     Cancer Paternal Grandmother      leukemia    Diabetes Neg Hx      Social History   Substance Use Topics    Smoking status: Former Smoker     Packs/day: 1.00     Years: 45.00     Types: Cigarettes     Quit date: 12/14/2015    Smokeless tobacco: Never Used      Comment: 1-1.5 ppd every day.    Alcohol use No     Review of Systems   Constitutional: Negative for fever.   HENT: Negative for congestion.    Eyes: Negative for visual disturbance.   Respiratory: Negative for shortness of breath.    Cardiovascular: Negative for chest pain.   Gastrointestinal: Negative for abdominal pain.   Genitourinary: Negative for difficulty urinating.   Musculoskeletal: Positive for myalgias (right foot pain). Negative for neck pain.   Skin: Negative for pallor.   Neurological: Negative for headaches.       Physical Exam   Initial Vitals   BP Pulse Resp Temp SpO2   03/29/17 0911 03/29/17 0911 03/29/17 0911 03/29/17 0911 03/29/17 0911   111/59 66 18 97.8 °F (36.6 °C) 95 %     Physical Exam    Nursing note and vitals reviewed.  Constitutional:   Comfortable, well-appearing, no distress.    Cardiovascular:   Pulses:       Posterior tibial pulses are 2+ on the right side, and 2+ on the left side.   Musculoskeletal:   He has mild tenderness over the 5th metatarsal with no significant swelling or ecchymosis.    Skin:   No abrasions noted.          ED Course   Procedures  Labs Reviewed - No data to display       X-Rays:   Independently Interpreted Readings:   Other Readings:  XR Right Foot, shows no fracture or dislocation.     Medical Decision Making:   History:   Old Medical Records: I decided to obtain old medical records.  Initial Assessment:   67 yo m, s/p minor foot injury yesterday, pain over 5th metatarsal    R/o fx with xray   Clinical Tests:   Radiological Study: Reviewed and Ordered            Scribe Attestation:   Scribe #1: I performed the above scribed service and  the documentation accurately describes the services I performed. I attest to the accuracy of the note.    Attending Attestation:           Physician Attestation for Scribe:  Physician Attestation Statement for Scribe #1: I, Dr. Arrieta, reviewed documentation, as scribed by Swapnil Cardoza in my presence, and it is both accurate and complete.                 ED Course     Clinical Impression:   The primary encounter diagnosis was Contusion of right foot, initial encounter. A diagnosis of Foot pain was also pertinent to this visit.          Lupe Arrieta MD  03/31/17 0144

## 2017-03-29 NOTE — ED AVS SNAPSHOT
[facility]  [address]  [facility] [address]               Audrey Oneil    3/29/2017  9:37 AM   ED    Description:  Male : 1950   Department:  [facility]           Your Care was Coordinated By:     Provider Role From To    Lupe Arrieta MD Attending Provider 17 0949 --      Reason for Visit     Foot Injury           Diagnoses this Visit        Comments    Contusion of right foot, initial encounter    -  Primary     Foot pain           ED Disposition     None           To Do List           Follow-up Information     Follow up with [facility].    Specialty:  Emergency Medicine    Why:  If symptoms worsen    Contact information:    [address]  [facility] [address]  [phone]      [facility]     [facility] Care [facility] -  Assistance  Registered nurses in the [facility] provide clinical advisement, health education, appointment booking, and other advisory services.  Call for this free service at [phone].             Medications           Message regarding Medications     Verify the changes and/or additions to your medication regime listed below are the same as discussed with your clinician today.  If any of these changes or additions are incorrect, please notify your healthcare provider.        These medications were administered today        Dose Freq    acetaminophen tablet 1,000 mg 1,000 mg ED 1 Time    Sig: Take 2 tablets (1,000 mg total) by mouth ED 1 Time.    Class: Normal    Route: Oral           Verify that the below list of medications is an accurate representation of the medications you are currently taking.  If none reported, the list may be blank. If incorrect, please contact your healthcare provider. Carry this list with you in case of emergency.           Current Medications     amiodarone (PACERONE) 200 MG Tab Take 1.5 tablets (300 mg total)  by mouth once daily.    aspirin (ECOTRIN) 325 MG EC tablet Take 325 mg by mouth once daily.    atorvastatin (LIPITOR) 80 MG tablet TAKE 1 TABLET(80 MG) BY MOUTH EVERY DAY    atorvastatin (LIPITOR) 80 MG tablet TAKE 1 TABLET(80 MG) BY MOUTH EVERY DAY    clopidogrel (PLAVIX) 75 mg tablet Take 75 mg by mouth once daily.    dexlansoprazole (DEXILANT) 60 mg capsule Take 60 mg by mouth once daily.    docusate sodium (COLACE) 50 MG capsule Take 1 capsule (50 mg total) by mouth once daily.    furosemide (LASIX) 40 MG tablet TAKE 1 TABLET BY MOUTH EVERY DAY    hydrocodone-acetaminophen 5-325mg (NORCO) 5-325 mg per tablet Take 1 tablet by mouth every 4 (four) hours as needed for Pain.    lisinopril (PRINIVIL,ZESTRIL) 5 MG tablet Take 1 tablet (5 mg total) by mouth once daily.    metoprolol succinate (TOPROL-XL) 50 MG 24 hr tablet TAKE 1 TABLET BY MOUTH ONCE DAILY    spironolactone (ALDACTONE) 25 MG tablet TAKE 1 TABLET(25 MG) BY MOUTH EVERY DAY    nitroGLYCERIN (NITROSTAT) 0.4 MG SL tablet Place 1 tablet (0.4 mg total) under the tongue every 5 (five) minutes as needed for Chest pain.           Clinical Reference Information           Your Vitals Were     BP Pulse Temp Resp Weight SpO2    111/59 66 97.8 °F (36.6 °C) (Oral) 18 102.1 kg (225 lb) 95%    BMI                35.24 kg/m2          Allergies as of 3/29/2017        Reactions    Iodine Containing Multivitamin     Keflex [Cephalexin]     Peaches [Peach (Prunus Persica)]     Shellfish Containing Products     Tuberculin Shital Test Ppd     Fig Tree Rash      Immunizations Administered on Date of Encounter - 3/29/2017     None      ED Micro, Lab, POCT     None      ED Imaging Orders     Start Ordered       Status Ordering Provider    03/29/17 1002 03/29/17 1001  X-Ray Foot Complete Right  1 time imaging      In process         Discharge Instructions         Foot Contusion  You have a contusion. This is also called a bruise. There is swelling and some bleeding under the skin,  but no broken bones. This injury generally takes a few days to a few weeks to heal.  During that time, the bruise will typically change in color from reddish, to purple-blue, to greenish-yellow, then to yellow-brown.  Home care  · Elevate the foot to reduce pain and swelling. As much as possible, sit or lie down with the foot raised about the level of your heart. This is especially important during the first 48 hours.  · Ice the foot to help reduce pain and swelling. Wrap a cold source (ice pack or ice cubes in a plastic bag) in a thin towel. Apply to the bruised area for 20 minutes every 1 to 2 hours the first day. Continue this 3 to 4 times a day until the pain and swelling goes away.  · Unless another medication was prescribed, you can take acetaminophen, ibuprofen, or naproxen to control pain. (If you have chronic liver or kidney disease or ever had a stomach ulcer or GI bleeding, talk with your doctor before using these medicines.)  Follow up  Follow up with your health care provider or our staff as advised. Call if you are not improving within 1 to 2 weeks.  When to seek medical advice   Call your health care provider right away if you have any of the following:  · Increased pain or swelling  · Foot or leg becomes cold, blue, numb or tingly  · Signs of infection: Warmth, drainage, or increased redness or pain around the bruise  · Inability to move the injured foot   · Frequent bruising for unknown reasons  Date Last Reviewed: 4/29/2015  © 0258-2921 [employer]. [address] [place] [address], [place] [address]. All rights reserved. This information is not intended as a substitute for professional medical care. Always follow your healthcare professional's instructions.          Your Scheduled Appointments     Mar 30, 2017  7:50 AM CDT   Non-Fasting Lab with LAB, APPOINTMENT [place] [facility] ([facility])    [address] [place] [id number] [phone]             Mar 30, 2017  9:00 AM CDT   Established Patient Visit with Edison Marshall MD   [facility] [facility]    [address]  [place] [id number] [phone]            Jun 29, 2017  8:00 AM CDT   Remote Interrog[facility] with [facility]   [facility] ([address] )    [address]  [place] [address] [phone]              [facility] [employer] Sign-Up     Activating your [facility] account is as easy as 1-2-3!     1) Visit [email or web address], select Sign Up Now, enter this activation code and your date of birth, then select Next.  Activation code not generated  Current Patient Portal Status: Account disabled      2) Create a username and password to use when you visit [facility] in the future and select a security question in case you lose your password and select Next.    3) Enter your e-mail address and click Sign Up!    Additional Information  If you have questions, please e-mail [email or web address] or call [phone] to talk to our [facility] with a [facility] staff. Remember, [facility] is NOT to be used for urgent needs. For medical emergencies, dial 911.         Smoking Cessation     If you would like to quit smoking:   You may be eligible for free services if you are a [place] resident and started smoking cigarettes before September 1, 1988.  Call the Smoking Cessation Trust ([facility]) toll free at (741) 050-1233 or (311) 108-4959.   Call 1-800-QUIT-NOW if you do not meet the above criteria.             [facility] complies with applicable Federal civil rights laws and does not discriminate on the basis of race, color, national origin, age, disability, or sex.        Language Assistance Services     ATTENTION: Language assistance services are available, free of charge. Please call [phone].      ATENCIÓN: Si habla español, tiene a garcai disposición servicios gratuitos de asistencia lingüística. Llame al [phone].     CHÚ Ý: N?u b?n  nói Ti?ng Vi?t, có các d?ch v? h? tr? ngôn ng? mi?n phí dành cho b?n. G?i s? [phone].

## 2017-03-29 NOTE — ED NOTES
Pt identifiers Audrey Oneil Jr. were checked and correct  LOC: The patient is awake, alert, aware of environment with an appropriate affect. Oriented x3, speaking appropriately  APPEARANCE: Pt resting comfortably, in no acute distress, pt is clean and well groomed, clothing properly fastened  SKIN: Skin warm, dry and intact, normal skin turgor, moist mucus membranes  RESPIRATORY: Airway is open and patent, respirations are spontaneous, even and unlabored, normal effort and rate  CARDIAC: Normal rate and rhythm, no peripheral edema noted, capillary refill < 3 seconds, bilateral radial pulses 2+  ABDOMEN: Soft, non tender, non distended. Bowel sounds present x 4 quadrants.   NEUROLOGIC: PERRLA, facial expression is symmetrical, patient moving all extremities spontaneously, normal sensation in all extremities when touched with a finger.  Follows all commands appropriately.  MUSCULOSKELETAL: No obvious deformities. Pt c/o right foot pain d/t injury.

## 2017-03-29 NOTE — DISCHARGE INSTRUCTIONS
Foot Contusion  You have a contusion. This is also called a bruise. There is swelling and some bleeding under the skin, but no broken bones. This injury generally takes a few days to a few weeks to heal.  During that time, the bruise will typically change in color from reddish, to purple-blue, to greenish-yellow, then to yellow-brown.  Home care  · Elevate the foot to reduce pain and swelling. As much as possible, sit or lie down with the foot raised about the level of your heart. This is especially important during the first 48 hours.  · Ice the foot to help reduce pain and swelling. Wrap a cold source (ice pack or ice cubes in a plastic bag) in a thin towel. Apply to the bruised area for 20 minutes every 1 to 2 hours the first day. Continue this 3 to 4 times a day until the pain and swelling goes away.  · Unless another medication was prescribed, you can take acetaminophen, ibuprofen, or naproxen to control pain. (If you have chronic liver or kidney disease or ever had a stomach ulcer or GI bleeding, talk with your doctor before using these medicines.)  Follow up  Follow up with your health care provider or our staff as advised. Call if you are not improving within 1 to 2 weeks.  When to seek medical advice   Call your health care provider right away if you have any of the following:  · Increased pain or swelling  · Foot or leg becomes cold, blue, numb or tingly  · Signs of infection: Warmth, drainage, or increased redness or pain around the bruise  · Inability to move the injured foot   · Frequent bruising for unknown reasons  Date Last Reviewed: 4/29/2015  © 8036-6585 [employer] (ABAM). [address] [place] [address], [place] [address]. All rights reserved. This information is not intended as a substitute for professional medical care. Always follow your healthcare professional's instructions.

## 2017-03-30 ENCOUNTER — DOCUMENTATION ONLY (OUTPATIENT)
Dept: TRANSPLANT | Facility: CLINIC | Age: 67
End: 2017-03-30

## 2017-03-30 ENCOUNTER — OFFICE VISIT (OUTPATIENT)
Dept: TRANSPLANT | Facility: CLINIC | Age: 67
End: 2017-03-30
Payer: MEDICARE

## 2017-03-30 ENCOUNTER — LAB VISIT (OUTPATIENT)
Dept: LAB | Facility: HOSPITAL | Age: 67
End: 2017-03-30
Attending: INTERNAL MEDICINE
Payer: MEDICARE

## 2017-03-30 VITALS
DIASTOLIC BLOOD PRESSURE: 58 MMHG | WEIGHT: 237.44 LBS | HEIGHT: 67 IN | HEART RATE: 64 BPM | BODY MASS INDEX: 37.27 KG/M2 | SYSTOLIC BLOOD PRESSURE: 75 MMHG

## 2017-03-30 DIAGNOSIS — I50.42 CHRONIC COMBINED SYSTOLIC AND DIASTOLIC HEART FAILURE: Primary | ICD-10-CM

## 2017-03-30 DIAGNOSIS — I25.5 ISCHEMIC CARDIOMYOPATHY: ICD-10-CM

## 2017-03-30 DIAGNOSIS — Z95.810 AICD (AUTOMATIC CARDIOVERTER/DEFIBRILLATOR) PRESENT: ICD-10-CM

## 2017-03-30 DIAGNOSIS — E78.5 HYPERLIPIDEMIA LDL GOAL <70: ICD-10-CM

## 2017-03-30 DIAGNOSIS — Z76.82 ORGAN TRANSPLANT CANDIDATE: ICD-10-CM

## 2017-03-30 DIAGNOSIS — I50.22 CHRONIC SYSTOLIC CONGESTIVE HEART FAILURE: Primary | ICD-10-CM

## 2017-03-30 DIAGNOSIS — I50.42 CHRONIC COMBINED SYSTOLIC AND DIASTOLIC HEART FAILURE: ICD-10-CM

## 2017-03-30 DIAGNOSIS — E66.9 NON MORBID OBESITY, UNSPECIFIED OBESITY TYPE: Chronic | ICD-10-CM

## 2017-03-30 LAB
ALBUMIN SERPL BCP-MCNC: 3.8 G/DL
ALP SERPL-CCNC: 91 U/L
ALT SERPL W/O P-5'-P-CCNC: 33 U/L
ANION GAP SERPL CALC-SCNC: 10 MMOL/L
AST SERPL-CCNC: 20 U/L
BILIRUB SERPL-MCNC: 0.4 MG/DL
BNP SERPL-MCNC: 63 PG/ML
BUN SERPL-MCNC: 32 MG/DL
CALCIUM SERPL-MCNC: 9.4 MG/DL
CHLORIDE SERPL-SCNC: 103 MMOL/L
CO2 SERPL-SCNC: 29 MMOL/L
CREAT SERPL-MCNC: 1.4 MG/DL
EST. GFR  (AFRICAN AMERICAN): >60 ML/MIN/1.73 M^2
EST. GFR  (NON AFRICAN AMERICAN): 52.7 ML/MIN/1.73 M^2
GLUCOSE SERPL-MCNC: 123 MG/DL
POTASSIUM SERPL-SCNC: 4.9 MMOL/L
PROT SERPL-MCNC: 6.9 G/DL
SODIUM SERPL-SCNC: 142 MMOL/L

## 2017-03-30 PROCEDURE — 99999 PR PBB SHADOW E&M-EST. PATIENT-LVL III: CPT | Mod: PBBFAC,,, | Performed by: INTERNAL MEDICINE

## 2017-03-30 PROCEDURE — 1160F RVW MEDS BY RX/DR IN RCRD: CPT | Mod: S$GLB,,, | Performed by: INTERNAL MEDICINE

## 2017-03-30 PROCEDURE — 99499 UNLISTED E&M SERVICE: CPT | Mod: S$PBB,,, | Performed by: INTERNAL MEDICINE

## 2017-03-30 PROCEDURE — 99214 OFFICE O/P EST MOD 30 MIN: CPT | Mod: S$GLB,,, | Performed by: INTERNAL MEDICINE

## 2017-03-30 PROCEDURE — 3078F DIAST BP <80 MM HG: CPT | Mod: S$GLB,,, | Performed by: INTERNAL MEDICINE

## 2017-03-30 PROCEDURE — 3074F SYST BP LT 130 MM HG: CPT | Mod: S$GLB,,, | Performed by: INTERNAL MEDICINE

## 2017-03-30 NOTE — MR AVS SNAPSHOT
[facility]  [address]  [facility] [address]  Phone: [phone]                  Audrey GEORGE Thad Mccarthy   3/30/2017 9:00 AM   Office Visit    Description:  Male : 1950   Provider:  Edison Marshall MD   Department:  [facility]           Reason for Visit     Heart Transplant Pre-evaluation           Diagnoses this Visit        Comments    Chronic systolic congestive heart failure    -  Primary     Ischemic cardiomyopathy         Hyperlipidemia LDL goal <70         AICD (automatic cardioverter/defibrillator) present         Non morbid obesity, unspecified obesity type                To Do List           Future Appointments        Provider Department Dept Phone    2017 8:00 AM HOME MONITOR DEVICE CHECK, [facility] [phone]      Goals (5 Years of Data)     None      [facility]     [facility] Care [facility] - / Assistance  Unless otherwise directed by your provider, please contact [facility], our nurse care line that is available for  assistance.     Registered nurses in the [facility] provide: appointment scheduling, clinical advisement, health education, and other advisory services.  Call: [phone] (toll free)               Medications           Message regarding Medications     Verify the changes and/or additions to your medication regime listed below are the same as discussed with your clinician today.  If any of these changes or additions are incorrect, please notify your healthcare provider.             Verify that the below list of medications is an accurate representation of the medications you are currently taking.  If none reported, the list may be blank. If incorrect, please contact your healthcare provider. Carry this list with you in case of emergency.           Current Medications     amiodarone (PACERONE) 200 MG Tab Take 1.5 tablets (300 mg total) by mouth once daily.    aspirin (ECOTRIN) 325 MG  "EC tablet Take 325 mg by mouth once daily.    atorvastatin (LIPITOR) 80 MG tablet TAKE 1 TABLET(80 MG) BY MOUTH EVERY DAY    atorvastatin (LIPITOR) 80 MG tablet TAKE 1 TABLET(80 MG) BY MOUTH EVERY DAY    clopidogrel (PLAVIX) 75 mg tablet Take 75 mg by mouth once daily.    dexlansoprazole (DEXILANT) 60 mg capsule Take 60 mg by mouth once daily.    docusate sodium (COLACE) 50 MG capsule Take 1 capsule (50 mg total) by mouth once daily.    furosemide (LASIX) 40 MG tablet TAKE 1 TABLET BY MOUTH EVERY DAY    hydrocodone-acetaminophen 5-325mg (NORCO) 5-325 mg per tablet Take 1 tablet by mouth every 4 (four) hours as needed for Pain.    lisinopril (PRINIVIL,ZESTRIL) 5 MG tablet Take 1 tablet (5 mg total) by mouth once daily.    metoprolol succinate (TOPROL-XL) 50 MG 24 hr tablet TAKE 1 TABLET BY MOUTH ONCE DAILY    spironolactone (ALDACTONE) 25 MG tablet TAKE 1 TABLET(25 MG) BY MOUTH EVERY DAY    nitroGLYCERIN (NITROSTAT) 0.4 MG SL tablet Place 1 tablet (0.4 mg total) under the tongue every 5 (five) minutes as needed for Chest pain.           Clinical Reference Information           Your Vitals Were     BP Pulse Height Weight BMI    75/58 64 5' 7" (1.702 m) 107.7 kg (237 lb 7 oz) 37.19 kg/m2      Blood Pressure          Most Recent Value    BP  (!)  75/58      Allergies as of 3/30/2017     Iodine Containing Multivitamin    Keflex [Cephalexin]    Peaches [Peach (Prunus Persica)]    Shellfish Containing Products    Tuberculin Shital Test Ppd    Fig Tree      Immunizations Administered on Date of Encounter - 3/30/2017     None      Orders Placed During Today's Visit     Future Labs/Procedures Expected by Expires    Protime-INR  3/30/2017 5/29/2018    Basic metabolic panel  4/6/2017 (Approximate) 5/29/2018    Cath lab procedure  4/29/2017 (Approximate) 3/30/2018    2D Echo w/ Color Flow Doppler  As directed 3/30/2018    CBC auto differential  As directed 3/30/2018      Maintenance Dialysis History     Patient has no recorded " history of maintenance dialysis.      Transplant Information        Txp Date Organ Coordinator Care Team     Heart Tammy Olivia Referring Physician:  Aaareferral Self   Corresponding Physician:  Aaareferral Self [facility] Sign-Up     Activating your [facility] account is as easy as 1-2-3!     1) Visit [email or web address], select Sign Up Now, enter this activation code and your date of birth, then select Next.  Activation code not generated  Current Patient Portal Status: Account disabled      2) Create a username and password to use when you visit [facility] in the future and select a security question in case you lose your password and select Next.    3) Enter your e-mail address and click Sign Up!    Additional Information  If you have questions, please e-mail [email or web address] or call [phone] to talk to our [facility] staff. Remember, [facility] is NOT to be used for urgent needs. For medical emergencies, dial 911.         Language Assistance Services     ATTENTION: Language assistance services are available, free of charge. Please call [phone].      ATENCIÓN: Si habla español, tiene a garcia disposición servicios gratuitos de asistencia lingüística. Llame al [phone].     CHÚ Ý: N?u b?n nói Ti?ng Vi?t, có các d?ch v? h? tr? ngôn ng? mi?n phí dành cho b?n. G?i s? [phone].         [facility] complies with applicable Federal civil rights laws and does not discriminate on the basis of race, color, national origin, age, disability, or sex.

## 2017-03-30 NOTE — PROGRESS NOTES
At the request of Dr. Marshall, I have been asked to meet patient and provide VAD education. Introduced self and reason for visit. Pt AAAO.  Provided written VAD education (red folder). Included in folder is the following: VAD education, wellness contract, acknowledgement of being evaluated for VAD, support group flier, ICU visitation hours, VAD newsletter, Intermacs information, picture of VAD  In body, [employer] pamphlet, Living with HM II DVD, There is hope pamphlet, Tomorrow depends on the decision we make today patient education pack (HM II), [employer] information, and business cards for all VAD coordinators. Explained that we use 3 different types of pumps here and information on both pumps is in the red folder. I explained the work up process as well.     Explained to look over the entire contents and read the wellness contract, caregiver agreement and acknowledgement form.  Also explained they should bring this folder with them to all clinic visits and if they are admitted to the hospital so we can continue education as needed. Should there be any questions, please write them down and bring with you or feel free to call and we can talk on the phone. All questions answered to satisfaction as evidence by verbal acknowledgement.

## 2017-03-30 NOTE — Clinical Note
March 30, 2017        Stephanie Jade             [facility]  [address]  [facility] [address]  Phone: [phone]   Patient: Audrey Oneil Jr.   MR Number: [id number]   YOB: 1950   Date of Visit: 3/30/2017       Dear Dr. Stephanie Jade    Thank you for referring Audrey Oneil to me for evaluation. Attached you will find relevant portions of my assessment and plan of care.    If you have questions, please do not hesitate to call me. I look forward to following Audrey Oneil along with you.    Sincerely,    Edison Marshall MD    Enclosure    If you would like to receive this communication electronically, please contact [email or web address] or [phone] to request [employer] Link access.    [employer] Link is a tool which provides read-only access to select patient information with whom you have a relationship. Its easy to use and provides real time access to review your patients record including encounter summaries, notes, results, and demographic information.    If you feel you have received this communication in error or would no longer like to receive these types of communications, please e-mail [email or web address]

## 2017-03-30 NOTE — PROGRESS NOTES
Subjective:   Initial evaluation of heart transplant candidacy.    HPI:  Mr. Oneil is a very pleasant 64 yo male with a hx of CAD s/p STEMI(s/p PCI LAD 2007), CHF/ICM and remote MI, quit smoking Dec 2015,  PE (2010, s/p 1-yr coumadin), HTN, DLP and s/p ICD [facility] placement due to VT who comes for an urgent visit. He usually follows with Dr. Henry. At that time patient was very confused with HF regimen. His regimen was adjusted and he was started on Entresto, spironolactone and his coreg was changed to metoprolol. Patient then developed what appears to be angioedema and Entresto was discontinued. I had seen him last month and was worried that he was declining and his HF symptoms were worsening and therefore I had ordered a repeat  CPX that showed low Peak VO2 of 7.5ml/kg/min but AT was not attained and RER was only 0.67 (poor effort). Most recent 2D echo showed severely depressed LV function with an EF=15-20%, enlarged LV with a LVEDD of 6.7 cm, normal RV size and function. Clinically reports NYHA class III-IV symptoms. Does not smoke. Labs reviewed. Creatinine today is 1.4.     2D Echo done in 11/2016  - Moderate left ventricular enlargement.   - Severely depressed left ventricular systolic function (EF 15-20%).   - Normal right ventricular systolic function .   - Left ventricular diastolic dysfunction.   - Mild left atrial enlargement.   - The estimated PA systolic pressure is greater than 25 mmHg.   - Trivial to mild mitral regurgitation.   - Trivial tricuspid regurgitation.     Past Medical History:   Diagnosis Date    Chronic anticoagulation 5/5/2016    Chronic combined systolic and diastolic heart failure 11/26/2012    EF 10-20% on ECHO 2013    Clotting disorder     Coronary artery disease involving native coronary artery of native heart without angina pectoris 11/26/2012    Cath 10- Stents patent non-obstructive disease Cath 11-12015 non-obstructive disease     Diverticulosis of colon      "DVT (deep venous thrombosis), unspecified laterality 11/12/2015    Essential hypertension 11/15/2015    Hyperlipidemia     Hypertensive heart disease with heart failure 5/5/2016    MI (myocardial infarction) 2009    Nicotine abuse     Obesity 11/26/2012    Pulmonary embolism 2011    Stented coronary artery 11/26/2012    LAD stent placed 10/17/2007      Past Surgical History:   Procedure Laterality Date    APPENDECTOMY      BACK SURGERY      CARDIAC SURGERY      stent    r knee scope      TONSILLECTOMY         Family History   Problem Relation Age of Onset    Cancer Mother      colon cancer    Heart disease Mother     Heart disease Father     Stroke Father     Colon polyps Father     Cancer Paternal Grandmother      leukemia    Diabetes Neg Hx        Review of Systems   Constitution: Positive for weakness and malaise/fatigue. Negative for chills, decreased appetite, diaphoresis, fever, night sweats, weight gain and weight loss.   Eyes: Negative.    Cardiovascular: Positive for dyspnea on exertion, orthopnea and paroxysmal nocturnal dyspnea. Negative for chest pain, claudication, cyanosis, irregular heartbeat, leg swelling, near-syncope, palpitations and syncope.   Respiratory: Negative for cough, hemoptysis and shortness of breath.    Endocrine: Negative.    Hematologic/Lymphatic: Negative.    Skin: Negative for color change, dry skin and nail changes.   Musculoskeletal: Negative.    Gastrointestinal: Negative.    Genitourinary: Negative.        Objective:   Blood pressure (!) 75/58, pulse 64, height 5' 7" (1.702 m), weight 107.7 kg (237 lb 7 oz).body mass index is 37.19 kg/(m^2).    Physical Exam   Constitutional: He appears well-developed.   BP (!) 75/58  Pulse 64  Ht 5' 7" (1.702 m)  Wt 107.7 kg (237 lb 7 oz)  BMI 37.19 kg/m2     HENT:   Head: Normocephalic.   Neck: No JVD present. Carotid bruit is not present.   Cardiovascular: Regular rhythm, normal heart sounds and normal pulses.  PMI is " displaced.  Exam reveals no gallop, no S3 and no S4.    No murmur heard.  Pulmonary/Chest: Effort normal and breath sounds normal. No respiratory distress. He has no wheezes. He has no rales.   Abdominal: Soft. Bowel sounds are normal. He exhibits no distension. There is no tenderness. There is no rebound.   Musculoskeletal: He exhibits no edema.   Neurological: He is alert.   Skin: Skin is warm.   Vitals reviewed.      Labs:      Chemistry        Component Value Date/Time     03/30/2017 0710    K 4.9 03/30/2017 0710     03/30/2017 0710    CO2 29 03/30/2017 0710    BUN 32 (H) 03/30/2017 0710    CREATININE 1.4 03/30/2017 0710     (H) 03/30/2017 0710        Component Value Date/Time    CALCIUM 9.4 03/30/2017 0710    ALKPHOS 91 03/30/2017 0710    AST 20 03/30/2017 0710    ALT 33 03/30/2017 0710    BILITOT 0.4 03/30/2017 0710          Magnesium   Date Value Ref Range Status   09/27/2016 2.0 1.6 - 2.6 mg/dL Final     Lab Results   Component Value Date    WBC 8.69 12/08/2016    HGB 14.3 12/08/2016    HCT 42.8 12/08/2016    MCV 93 12/08/2016     12/08/2016     BNP   Date Value Ref Range Status   03/30/2017 63 0 - 99 pg/mL Final     Comment:     Values of less than 100 pg/ml are consistent with non-CHF populations.   02/02/2017 158 (H) 0 - 99 pg/mL Final     Comment:     Values of less than 100 pg/ml are consistent with non-CHF populations.   09/27/2016 82 0 - 99 pg/mL Final     Comment:     Values of less than 100 pg/ml are consistent with non-CHF populations.     No results found for this or any previous visit.    Labs were reviewed with the patient.    Assessment:      1. Chronic systolic congestive heart failure    2. Ischemic cardiomyopathy    3. Hyperlipidemia LDL goal <70    4. AICD (automatic cardioverter/defibrillator) present    5. Non morbid obesity, unspecified obesity type        Plan:   65 y/o male with ICMP, HFrEF stage D with wosening HF symptoms, low BP.   Would like to repeat his  RHC to assess his hemodynamics and 2D echo  Will likely start VAD work-up if his hemodynamics show low output  Patient was provided documentation about LVAD and met with Abiola our LVAD coordinator.   Recommend 2 gram sodium restriction and 1500cc fluid restriction.  Encourage physical activity with graded exercise program.  Requested patient to weigh themselves daily, and to notify us if their weight increases by more than 3 lbs in 1 day or 5 lbs in 1 week.   2D echo with CFD and RHC with labs to be done prior to the procedure Monday    Edison Marshall MD

## 2017-03-31 ENCOUNTER — TELEPHONE (OUTPATIENT)
Dept: TRANSPLANT | Facility: CLINIC | Age: 67
End: 2017-03-31

## 2017-03-31 LAB
AMPHETAMINES SERPL QL: NEGATIVE
BARBITURATES SERPL QL SCN: NEGATIVE
BENZODIAZ SERPL QL: NEGATIVE
CANNABINOIDS SERPL QL: NEGATIVE
COCAINE, BLOOD: NEGATIVE
ETHANOL SERPL-MCNC: NEGATIVE MG/DL
METHADONE SERPL QL SCN: NEGATIVE
OPIATES SERPL QL SCN: NEGATIVE
PCP SERPL QL SCN: NEGATIVE
PROPOXYPH SERPL QL: NEGATIVE

## 2017-03-31 NOTE — TELEPHONE ENCOUNTER
----- Message from Marlena Davis sent at 3/31/2017  9:17 AM CDT -----  Contact: pt  Pt called this morning to cancel all test and the procedure schedule for Monday and he would like a call from the nurse or  to discuss the test and procedure.    Thanks

## 2017-03-31 NOTE — TELEPHONE ENCOUNTER
"Returned pt's call regarding his cancelled appts. "I'm still undecided on whether or not I want to go through all of this. "is it going to give me a better life and if so for how long?  How long can I live just staying the way I am now?"    After long discussion with pt on risks and benefits of both OHT and LVAD with pt, pt still requests to cancel appts and speak with Dr Marshall regarding plan of care.       Message sent to Dr Marshall requesting he call pt to discuss.   "

## 2017-04-01 LAB
COTININE SERPL-MCNC: <3 NG/ML
NICOTINE SERPL-MCNC: <3 NG/ML

## 2017-04-14 ENCOUNTER — HOSPITAL ENCOUNTER (EMERGENCY)
Facility: HOSPITAL | Age: 67
Discharge: HOME | End: 2017-04-14
Attending: EMERGENCY MEDICINE
Payer: MEDICARE

## 2017-04-14 VITALS
HEIGHT: 67 IN | DIASTOLIC BLOOD PRESSURE: 50 MMHG | SYSTOLIC BLOOD PRESSURE: 95 MMHG | HEART RATE: 62 BPM | RESPIRATION RATE: 16 BRPM | TEMPERATURE: 99 F | BODY MASS INDEX: 37.2 KG/M2 | OXYGEN SATURATION: 94 % | WEIGHT: 237 LBS

## 2017-04-14 DIAGNOSIS — I50.42 CHRONIC COMBINED SYSTOLIC AND DIASTOLIC HEART FAILURE: Chronic | ICD-10-CM

## 2017-04-14 DIAGNOSIS — R53.1 WEAKNESS: ICD-10-CM

## 2017-04-14 DIAGNOSIS — I10 ESSENTIAL HYPERTENSION: ICD-10-CM

## 2017-04-14 DIAGNOSIS — I25.10 CORONARY ARTERY DISEASE INVOLVING NATIVE CORONARY ARTERY OF NATIVE HEART WITHOUT ANGINA PECTORIS: Primary | ICD-10-CM

## 2017-04-14 LAB
ALBUMIN SERPL BCP-MCNC: 4 G/DL
ALP SERPL-CCNC: 82 U/L
ALT SERPL W/O P-5'-P-CCNC: 28 U/L
AMMONIA PLAS-SCNC: 42 UMOL/L
ANION GAP SERPL CALC-SCNC: 12 MMOL/L
AST SERPL-CCNC: 20 U/L
BASOPHILS # BLD AUTO: 0.05 K/UL
BASOPHILS NFR BLD: 0.4 %
BILIRUB SERPL-MCNC: 0.5 MG/DL
BILIRUB UR QL STRIP: NEGATIVE
BNP SERPL-MCNC: 81 PG/ML
BUN SERPL-MCNC: 37 MG/DL
CALCIUM SERPL-MCNC: 9 MG/DL
CHLORIDE SERPL-SCNC: 106 MMOL/L
CLARITY UR REFRACT.AUTO: CLEAR
CO2 SERPL-SCNC: 24 MMOL/L
COLOR UR AUTO: NORMAL
CREAT SERPL-MCNC: 1.6 MG/DL
DIFFERENTIAL METHOD: ABNORMAL
EOSINOPHIL # BLD AUTO: 0.1 K/UL
EOSINOPHIL NFR BLD: 1.2 %
ERYTHROCYTE [DISTWIDTH] IN BLOOD BY AUTOMATED COUNT: 13.1 %
EST. GFR  (AFRICAN AMERICAN): 51.9 ML/MIN/1.73 M^2
EST. GFR  (NON AFRICAN AMERICAN): 44.9 ML/MIN/1.73 M^2
GLUCOSE SERPL-MCNC: 98 MG/DL
GLUCOSE UR QL STRIP: NEGATIVE
HCT VFR BLD AUTO: 40.9 %
HGB BLD-MCNC: 14.1 G/DL
HGB UR QL STRIP: NEGATIVE
KETONES UR QL STRIP: NEGATIVE
LEUKOCYTE ESTERASE UR QL STRIP: NEGATIVE
LIPASE SERPL-CCNC: 46 U/L
LYMPHOCYTES # BLD AUTO: 3.8 K/UL
LYMPHOCYTES NFR BLD: 33.3 %
MCH RBC QN AUTO: 32.3 PG
MCHC RBC AUTO-ENTMCNC: 34.5 %
MCV RBC AUTO: 94 FL
MONOCYTES # BLD AUTO: 1.4 K/UL
MONOCYTES NFR BLD: 12.2 %
NEUTROPHILS # BLD AUTO: 5.9 K/UL
NEUTROPHILS NFR BLD: 52.3 %
NITRITE UR QL STRIP: NEGATIVE
PH UR STRIP: 5 [PH] (ref 5–8)
PLATELET # BLD AUTO: 266 K/UL
PMV BLD AUTO: 9.8 FL
POTASSIUM SERPL-SCNC: 4.2 MMOL/L
PROT SERPL-MCNC: 7 G/DL
PROT UR QL STRIP: NEGATIVE
RBC # BLD AUTO: 4.37 M/UL
SODIUM SERPL-SCNC: 142 MMOL/L
SP GR UR STRIP: 1 (ref 1–1.03)
TROPONIN I SERPL DL<=0.01 NG/ML-MCNC: 0.03 NG/ML
URN SPEC COLLECT METH UR: NORMAL
UROBILINOGEN UR STRIP-ACNC: NEGATIVE EU/DL
WBC # BLD AUTO: 11.33 K/UL

## 2017-04-14 PROCEDURE — 83880 ASSAY OF NATRIURETIC PEPTIDE: CPT

## 2017-04-14 PROCEDURE — 99283 EMERGENCY DEPT VISIT LOW MDM: CPT

## 2017-04-14 PROCEDURE — 81003 URINALYSIS AUTO W/O SCOPE: CPT

## 2017-04-14 PROCEDURE — 80053 COMPREHEN METABOLIC PANEL: CPT

## 2017-04-14 PROCEDURE — 84484 ASSAY OF TROPONIN QUANT: CPT

## 2017-04-14 PROCEDURE — 82140 ASSAY OF AMMONIA: CPT

## 2017-04-14 PROCEDURE — 99285 EMERGENCY DEPT VISIT HI MDM: CPT | Mod: NTX,,, | Performed by: EMERGENCY MEDICINE

## 2017-04-14 PROCEDURE — 85025 COMPLETE CBC W/AUTO DIFF WBC: CPT

## 2017-04-14 PROCEDURE — 83690 ASSAY OF LIPASE: CPT

## 2017-04-14 NOTE — ED PROVIDER NOTES
Encounter Date: 4/14/2017       History     Chief Complaint   Patient presents with    Weakness     Dizziness/weakness ever since having some pacemaker settings adjusted last month. Denies CP or SOb.     Review of patient's allergies indicates:   Allergen Reactions    Iodine containing multivitamin     Keflex [cephalexin]     Peaches [peach (prunus persica)]     Shellfish containing products     Tuberculin spenser test ppd     Fig tree Rash     HPI Comments: 67 yo M to ED for eval of worsening weakness. Pt has numerous medical co-morbidities and says his weakness is worsening over the past few days. He denies any changes in his ADLs. He has no nausea or emesis. No CP or SOB.     Past Medical History:   Diagnosis Date    Chronic anticoagulation 5/5/2016    Chronic combined systolic and diastolic heart failure 11/26/2012    EF 10-20% on ECHO 2013    Clotting disorder     Coronary artery disease involving native coronary artery of native heart without angina pectoris 11/26/2012    Cath 10- Stents patent non-obstructive disease Cath 11-12015 non-obstructive disease     Diverticulosis of colon     DVT (deep venous thrombosis), unspecified laterality 11/12/2015    Essential hypertension 11/15/2015    Hyperlipidemia     Hypertensive heart disease with heart failure 5/5/2016    MI (myocardial infarction) 2009    Nicotine abuse     Obesity 11/26/2012    Pulmonary embolism 2011    Stented coronary artery 11/26/2012    LAD stent placed 10/17/2007      Past Surgical History:   Procedure Laterality Date    APPENDECTOMY      BACK SURGERY      CARDIAC SURGERY      stent    r knee scope      TONSILLECTOMY       Family History   Problem Relation Age of Onset    Cancer Mother      colon cancer    Heart disease Mother     Heart disease Father     Stroke Father     Colon polyps Father     Cancer Paternal Grandmother      leukemia    Diabetes Neg Hx      Social History   Substance Use Topics     Smoking status: Former Smoker     Packs/day: 1.00     Years: 45.00     Types: Cigarettes     Quit date: 12/14/2015    Smokeless tobacco: Never Used      Comment: 1-1.5 ppd every day.    Alcohol use No     Review of Systems   Constitutional: Negative for chills, fatigue and fever.   HENT: Negative for sore throat.    Eyes: Negative for photophobia.   Respiratory: Negative for shortness of breath.    Cardiovascular: Negative for chest pain.   Gastrointestinal: Negative for nausea.   Endocrine: Negative for polydipsia and polyuria.   Genitourinary: Negative for dysuria.   Musculoskeletal: Negative for back pain.   Skin: Negative for rash.   Neurological: Positive for weakness. Negative for dizziness.   Hematological: Does not bruise/bleed easily.   Psychiatric/Behavioral: Negative for agitation.       Physical Exam   Initial Vitals   BP Pulse Resp Temp SpO2   04/14/17 0106 04/14/17 0106 04/14/17 0106 04/14/17 0106 04/14/17 0106   111/59 74 18 98.9 °F (37.2 °C) 97 %     Physical Exam    Constitutional: Vital signs are normal. He appears well-developed and well-nourished. He is not diaphoretic. No distress.   HENT:   Head: Normocephalic and atraumatic.   Right Ear: External ear normal.   Left Ear: External ear normal.   Eyes: Conjunctivae are normal.   Cardiovascular: Normal rate, regular rhythm and normal heart sounds. Exam reveals no gallop and no friction rub.    No murmur heard.  Pulmonary/Chest: Breath sounds normal. No respiratory distress. He has no wheezes. He has no rhonchi. He has no rales. He exhibits no tenderness.   Abdominal: Soft. Normal appearance, normal aorta and bowel sounds are normal. He exhibits no distension and no mass. There is no tenderness. There is no rebound and no guarding.   Musculoskeletal: Normal range of motion.   Neurological: He is alert and oriented to person, place, and time.   Skin: Skin is warm and intact.   Psychiatric: He has a normal mood and affect. His speech is normal and  behavior is normal. Cognition and memory are normal.         ED Course   Procedures  Labs Reviewed   CBC W/ AUTO DIFFERENTIAL - Abnormal; Notable for the following:        Result Value    RBC 4.37 (*)     MCH 32.3 (*)     Mono # 1.4 (*)     All other components within normal limits   COMPREHENSIVE METABOLIC PANEL - Abnormal; Notable for the following:     BUN, Bld 37 (*)     Creatinine 1.6 (*)     eGFR if  51.9 (*)     eGFR if non  44.9 (*)     All other components within normal limits   TROPONIN I - Abnormal; Notable for the following:     Troponin I 0.028 (*)     All other components within normal limits   URINALYSIS   LIPASE   AMMONIA   B-TYPE NATRIURETIC PEPTIDE             Medical Decision Making:   ED Management:  Pt evaluation in the ED reveals no acute findings. Stable chronic findings. Pt re-assured that I see no reason today for his worsening weakness and I believe he is stable for DC home.                    ED Course     Clinical Impression:   The primary encounter diagnosis was Coronary artery disease involving native coronary artery of native heart without angina pectoris. Diagnoses of Weakness, Chronic combined systolic and diastolic heart failure, and Essential hypertension were also pertinent to this visit.          Gilbert Streeter PA-C  04/14/17 0513

## 2017-04-14 NOTE — PROVIDER PROGRESS NOTES - EMERGENCY DEPT.
Encounter Date: 4/14/2017    ED Physician Progress Notes       SCRIBE NOTE: I, Shannon Boyce , am scribing for, and in the presence of,  Dr. Hardy .  Physician Statement: I, Dr. Hardy , personally performed the services described in this documentation as scribed by Shannon Boyce  in my presence, and it is both accurate and complete.      EKG - STEMI Decision  Initial Reading: No STEMI present.

## 2017-04-14 NOTE — ED TRIAGE NOTES
Pt reports having episodes of dizziness and weakness x1 month. Reports having an episode prior to coming to the ED. Denies feeling dizzy or weak at this time.

## 2017-04-14 NOTE — ED AVS SNAPSHOT
[facility]  [address]  [facility] [address]               Audrey Oneil    2017  1:50 AM   ED    Description:  Male : 1950   [facility]  [facility]           Your Care was Coordinated By:     Provider Role From To    Isiah Hardy MD Attending Provider 17 --    Gilbert Streeter PA-C Physician Assistant 17 --      Reason for Visit     Weakness           Diagnoses this Visit        Comments    Coronary artery disease involving native coronary artery of native heart without angina pectoris    -  Primary     Weakness         Chronic combined systolic and diastolic heart failure         Essential hypertension           ED Disposition     ED Disposition Condition Comment    Discharge             To Do List           Follow-up Information     Follow up with Primary Doctor [facility]     [facility] Nurse Care Line -  Assistance  Unless otherwise directed by your provider, please contact [facility], our nurse care line that is available for  assistance.     Registered nurses in the [facility] provide: appointment scheduling, clinical advisement, health education, and other advisory services.  Call: [phone] (toll free)               Medications           Message regarding Medications     Verify the changes and/or additions to your medication regime listed below are the same as discussed with your clinician today.  If any of these changes or additions are incorrect, please notify your healthcare provider.             Verify that the below list of medications is an accurate representation of the medications you are currently taking.  If none reported, the list may be blank. If incorrect, please contact your healthcare provider. Carry this list with you in case of emergency.           Current Medications     amiodarone (PACERONE) 200 MG Tab Take 1.5 tablets (300 mg  "total) by mouth once daily.    aspirin (ECOTRIN) 325 MG EC tablet Take 325 mg by mouth once daily.    atorvastatin (LIPITOR) 80 MG tablet TAKE 1 TABLET(80 MG) BY MOUTH EVERY DAY    clopidogrel (PLAVIX) 75 mg tablet Take 75 mg by mouth once daily.    furosemide (LASIX) 40 MG tablet TAKE 1 TABLET BY MOUTH EVERY DAY    hydrocodone-acetaminophen 5-325mg (NORCO) 5-325 mg per tablet Take 1 tablet by mouth every 4 (four) hours as needed for Pain.    lisinopril (PRINIVIL,ZESTRIL) 5 MG tablet Take 1 tablet (5 mg total) by mouth once daily.    metoprolol succinate (TOPROL-XL) 50 MG 24 hr tablet TAKE 1 TABLET BY MOUTH ONCE DAILY    spironolactone (ALDACTONE) 25 MG tablet TAKE 1 TABLET(25 MG) BY MOUTH EVERY DAY    atorvastatin (LIPITOR) 80 MG tablet TAKE 1 TABLET(80 MG) BY MOUTH EVERY DAY    dexlansoprazole (DEXILANT) 60 mg capsule Take 60 mg by mouth once daily.    docusate sodium (COLACE) 50 MG capsule Take 1 capsule (50 mg total) by mouth once daily.    nitroGLYCERIN (NITROSTAT) 0.4 MG SL tablet Place 1 tablet (0.4 mg total) under the tongue every 5 (five) minutes as needed for Chest pain.           Clinical Reference Information           Your Vitals Were     BP Pulse Temp Resp Height Weight    100/58 70 98.9 °F (37.2 °C) (Oral) 20 5' 7" (1.702 m) 107.5 kg (237 lb)    SpO2 BMI             97% 37.12 kg/m2         Allergies as of 4/14/2017        Reactions    Iodine Containing Multivitamin     Keflex [Cephalexin]     Peaches [Peach (Prunus Persica)]     Shellfish Containing Products     Tuberculin Shital Test Ppd     Fig Tree Rash      Immunizations Administered on Date of Encounter - 4/14/2017     None      ED Micro, Lab, POCT     Start Ordered       Status Ordering Provider    04/14/17 0258 04/14/17 0257  CBC auto differential  STAT      Final result     04/14/17 0258 04/14/17 0257  Comprehensive metabolic panel  STAT      Final result     04/14/17 0258 04/14/17 0257  Urinalysis  STAT      Final result     04/14/17 0258 " 04/14/17 0257  Lipase  STAT      Final result     04/14/17 0258 04/14/17 0257  Ammonia  Once      Final result     04/14/17 0258 04/14/17 0257  Brain natriuretic peptide  Once      Final result     04/14/17 0258 04/14/17 0257  Troponin I  Once      Final result       ED Imaging Orders     Start Ordered       Status Ordering Provider    04/14/17 0258 04/14/17 0258  X-Ray Chest PA And Lateral  1 time imaging      Final result       Your Scheduled Appointments     May 01, 2017  7:30 AM CDT   Non-Fasting Lab with LAB, APPOINTMENT [place] [facility] ([facility])    [address] [facility] [address] [address]   [phone]            May 01, 2017  8:30 AM CDT   Nurse Visit with HEARTTRANSPLANT, LVADN   [facility]    [address]  [place] [address]   [phone]            May 01, 2017  9:00 AM CDT   Established Patient Visit with Ediosn Marshall MD   [facility] )    [address]  [place] [address]   [phone]            Jun 29, 2017  8:00 AM CDT   Remote Interrog[facility] with [facility]ICE [facility] ([facility] )    [address]  [place] [address]   [phone]              [facility] Sign-Up     Activating your [facility] account is as easy as 1-2-3!     1) Visit [email or web address], select Sign Up Now, enter this activation code and your date of birth, then select Next.  Activation code not generated  Current Patient Portal Status: Account disabled      2) Create a username and password to use when you visit [facility] in the future and select a security question in case you lose your password and select Next.    3) Enter your e-mail address and click Sign Up!    Additional Information  If you have questions, please e-mail [email or web address] or call [phone] to talk to our [facility] staff. Remember, [facility] is NOT  to be used for urgent needs. For medical emergencies, dial 911.         Smoking Cessation     If you would like to quit smoking:   You may be eligible for free services if you are a [place] resident and started smoking cigarettes before September 1, 1988.  Call the Smoking Cessation Trust (SCT) toll free at (955) 768-1154 or (248) 201-5796.   Call 1-800-QUIT-NOW if you do not meet the above criteria.   Contact us via email: [email or web address]   View our website for more information: [email or web address]         [facility] complies with applicable Federal civil rights laws and does not discriminate on the basis of race, color, national origin, age, disability, or sex.        Language Assistance Services     ATTENTION: Language assistance services are available, free of charge. Please call [phone].      ATENCIÓN: Si habla español, tiene a garcia disposición servicios gratuitos de asistencia lingüística. Llame al [phone].     CHÚ Ý: N?u b?n nói Ti?ng Vi?t, có các d?ch v? h? tr? ngôn ng? mi?n phí dành cho b?n. G?i s? [phone].

## 2017-04-14 NOTE — ED NOTES
Patient identifiers verified and correct for Audrey Garciaedwin Mccarthy.    LOC: The patient is awake, alert and aware of environment with an appropriate affect, the patient is oriented x 3 and speaking appropriately.  APPEARANCE: Patient resting comfortably and in no acute distress, patient is clean and well groomed, patient's clothing is properly fastened.  SKIN: The skin is warm and dry, color consistent with ethnicity, patient has normal skin turgor and moist mucus membranes, skin intact, no breakdown or bruising noted.  MUSCULOSKELETAL: Patient moving all extremities spontaneously, no obvious swelling or deformities noted.  RESPIRATORY: Airway is open and patent, respirations are spontaneous, patient has a normal effort and rate, no accessory muscle use noted, bilateral breath sounds clear to auscultation  CARDIAC: Patient has a normal rate and regular rhythm, no periphreal edema noted, capillary refill < 3 seconds.  ABDOMEN: abdomen tender to palpation, abdominal distension noted, normoactive bowel sounds present in all four quadrants.  NEUROLOGIC:  eyes open spontaneously, behavior appropriate to situation, follows commands, facial expression symmetrical, bilateral hand grasp equal and even, purposeful motor response noted, normal sensation in all extremities when touched with a finger.

## 2017-04-17 RX ORDER — FUROSEMIDE 40 MG/1
TABLET ORAL
Qty: 30 TABLET | Refills: 0 | Status: SHIPPED | OUTPATIENT
Start: 2017-04-17 | End: 2017-05-05

## 2017-05-01 ENCOUNTER — DOCUMENTATION ONLY (OUTPATIENT)
Dept: TRANSPLANT | Facility: CLINIC | Age: 67
End: 2017-05-01

## 2017-05-05 ENCOUNTER — HOSPITAL ENCOUNTER (EMERGENCY)
Facility: HOSPITAL | Age: 67
Discharge: HOME | End: 2017-05-05
Attending: EMERGENCY MEDICINE | Admitting: EMERGENCY MEDICINE
Payer: MEDICARE

## 2017-05-05 VITALS
RESPIRATION RATE: 18 BRPM | HEART RATE: 60 BPM | TEMPERATURE: 98 F | HEIGHT: 67 IN | SYSTOLIC BLOOD PRESSURE: 118 MMHG | WEIGHT: 230 LBS | DIASTOLIC BLOOD PRESSURE: 62 MMHG | BODY MASS INDEX: 36.1 KG/M2 | OXYGEN SATURATION: 96 %

## 2017-05-05 DIAGNOSIS — S93.601A SPRAIN OF FOOT, RIGHT, INITIAL ENCOUNTER: ICD-10-CM

## 2017-05-05 DIAGNOSIS — M79.671 RIGHT FOOT PAIN: ICD-10-CM

## 2017-05-05 DIAGNOSIS — M25.571 RIGHT ANKLE PAIN: ICD-10-CM

## 2017-05-05 DIAGNOSIS — M25.511 RIGHT SHOULDER PAIN: ICD-10-CM

## 2017-05-05 DIAGNOSIS — S40.011A CONTUSION OF RIGHT SHOULDER, INITIAL ENCOUNTER: Primary | ICD-10-CM
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
## 2017-05-05 LAB
ALBUMIN SERPL BCP-MCNC: 4.1 G/DL
ALP SERPL-CCNC: 82 U/L
ALT SERPL W/O P-5'-P-CCNC: 31 U/L
ANION GAP SERPL CALC-SCNC: 9 MMOL/L
AST SERPL-CCNC: 22 U/L
BASOPHILS # BLD AUTO: 0.02 K/UL
BASOPHILS NFR BLD: 0.2 %
BILIRUB SERPL-MCNC: 0.7 MG/DL
BUN SERPL-MCNC: 27 MG/DL
CALCIUM SERPL-MCNC: 9.2 MG/DL
CHLORIDE SERPL-SCNC: 105 MMOL/L
CK SERPL-CCNC: 100 U/L
CO2 SERPL-SCNC: 27 MMOL/L
CREAT SERPL-MCNC: 1.5 MG/DL
DIFFERENTIAL METHOD: ABNORMAL
EOSINOPHIL # BLD AUTO: 0.1 K/UL
EOSINOPHIL NFR BLD: 1.3 %
ERYTHROCYTE [DISTWIDTH] IN BLOOD BY AUTOMATED COUNT: 13.1 %
EST. GFR  (AFRICAN AMERICAN): 56.1 ML/MIN/1.73 M^2
EST. GFR  (NON AFRICAN AMERICAN): 48.5 ML/MIN/1.73 M^2
GLUCOSE SERPL-MCNC: 103 MG/DL
HCT VFR BLD AUTO: 41.7 %
HGB BLD-MCNC: 13.9 G/DL
LYMPHOCYTES # BLD AUTO: 2.7 K/UL
LYMPHOCYTES NFR BLD: 30.3 %
MAGNESIUM SERPL-MCNC: 2.4 MG/DL
MCH RBC QN AUTO: 32.3 PG
MCHC RBC AUTO-ENTMCNC: 33.3 %
MCV RBC AUTO: 97 FL
MONOCYTES # BLD AUTO: 0.9 K/UL
MONOCYTES NFR BLD: 9.7 %
NEUTROPHILS # BLD AUTO: 5.2 K/UL
NEUTROPHILS NFR BLD: 58.1 %
PLATELET # BLD AUTO: 270 K/UL
PMV BLD AUTO: 9.8 FL
POTASSIUM SERPL-SCNC: 5.1 MMOL/L
PROT SERPL-MCNC: 7.1 G/DL
RBC # BLD AUTO: 4.3 M/UL
SODIUM SERPL-SCNC: 141 MMOL/L
WBC # BLD AUTO: 9.04 K/UL

## 2017-05-05 PROCEDURE — 80053 COMPREHEN METABOLIC PANEL: CPT | Mod: NTX

## 2017-05-05 PROCEDURE — 83735 ASSAY OF MAGNESIUM: CPT | Mod: NTX

## 2017-05-05 PROCEDURE — 93005 ELECTROCARDIOGRAM TRACING: CPT | Mod: NTX

## 2017-05-05 PROCEDURE — 99285 EMERGENCY DEPT VISIT HI MDM: CPT | Mod: NTX,,, | Performed by: EMERGENCY MEDICINE

## 2017-05-05 PROCEDURE — 85025 COMPLETE CBC W/AUTO DIFF WBC: CPT | Mod: NTX

## 2017-05-05 PROCEDURE — 82550 ASSAY OF CK (CPK): CPT | Mod: NTX

## 2017-05-05 PROCEDURE — 99284 EMERGENCY DEPT VISIT MOD MDM: CPT | Mod: 25,NTX

## 2017-05-05 PROCEDURE — 93010 ELECTROCARDIOGRAM REPORT: CPT | Mod: NTX,,, | Performed by: INTERNAL MEDICINE

## 2017-05-05 NOTE — ED PROVIDER NOTES
Encounter Date: 5/5/2017    SCRIBE #1 NOTE: I, Kenia Fields, am scribing for, and in the presence of, Dr. Romero.       History     Chief Complaint   Patient presents with    Shoulder Injury     states fell yesterday/right shoulder injury     Review of patient's allergies indicates:   Allergen Reactions    Iodine containing multivitamin     Keflex [cephalexin]     Peaches [peach (prunus persica)]     Shellfish containing products     Tuberculin spenser test ppd     Fig tree Rash     HPI Comments:   Patient is a 66 year old male with a PMHx of clotting disorder, chronic combined systolic and diastolic heart failure, HLD, MI, DVT, CAD, essential HTN, obesity, and stented coronary artery who presents with complaint of right shoulder pain and right ankle/foot pain after an episode of possible syncope yesterday in which he landed on his back. He also complains of an associated mild headache. Patient states he thinks he may have passed out because he doesn't remember the episode. Patient endorses he has a defibrillator and went to the Cardiology clinic today for checkup on it, and there apparently was no problem.     The history is provided by the patient and medical records.     Past Medical History:   Diagnosis Date    Chronic anticoagulation 5/5/2016    Chronic combined systolic and diastolic heart failure 11/26/2012    EF 10-20% on ECHO 2013    Clotting disorder     Coronary artery disease involving native coronary artery of native heart without angina pectoris 11/26/2012    Cath 10- Stents patent non-obstructive disease Cath 11-12015 non-obstructive disease     Diverticulosis of colon     DVT (deep venous thrombosis), unspecified laterality 11/12/2015    Essential hypertension 11/15/2015    Hyperlipidemia     Hypertensive heart disease with heart failure 5/5/2016    MI (myocardial infarction) 2009    Nicotine abuse     Obesity 11/26/2012    Pulmonary embolism 2011    Stented coronary  artery 11/26/2012    LAD stent placed 10/17/2007      Past Surgical History:   Procedure Laterality Date    APPENDECTOMY      BACK SURGERY      CARDIAC SURGERY      stent    r knee scope      TONSILLECTOMY       Family History   Problem Relation Age of Onset    Cancer Mother      colon cancer    Heart disease Mother     Heart disease Father     Stroke Father     Colon polyps Father     Cancer Paternal Grandmother      leukemia    Diabetes Neg Hx      Social History   Substance Use Topics    Smoking status: Former Smoker     Packs/day: 1.00     Years: 45.00     Types: Cigarettes     Quit date: 12/14/2015    Smokeless tobacco: Never Used      Comment: 1-1.5 ppd every day.    Alcohol use No     Review of Systems   Constitutional: Positive for activity change. Negative for appetite change and fever.   HENT: Negative for trouble swallowing.    Eyes: Negative for photophobia and visual disturbance.   Gastrointestinal: Negative for nausea.   Genitourinary: Negative for dysuria and hematuria.   Musculoskeletal: Positive for arthralgias and myalgias.   Skin: Positive for color change (ecchymosis ).   Neurological: Positive for syncope, light-headedness and headaches.       Physical Exam   Initial Vitals   BP Pulse Resp Temp SpO2   05/05/17 1134 05/05/17 1134 05/05/17 1134 05/05/17 1134 05/05/17 1134   122/69 67 18 98.2 °F (36.8 °C) 98 %     Physical Exam    Nursing note and vitals reviewed.  Constitutional: He appears distressed (mild).   HENT:   Mouth/Throat: Oropharynx is clear and moist.   Eyes: EOM are normal. Pupils are equal, round, and reactive to light.   Neck: Neck supple. No JVD present.   Cardiovascular: Regular rhythm.   No murmur heard.  Pulmonary/Chest: Breath sounds normal. No respiratory distress. He has no wheezes. He has no rhonchi. He has no rales. He exhibits no tenderness.   Musculoskeletal:   Right anterior muscle discomfort. Unable to raise shoulder to about 45 degrees. No bony  tenderness. No distortion of shoulder. Tenderness and some swelling in right ankle and right foot. Good range of motion of right foot.    Neurological: He is alert and oriented to person, place, and time.   Skin:   Mild contusion in right superior occipital area. No open wound. Also with bruising         ED Course   Procedures  Labs Reviewed   CBC W/ AUTO DIFFERENTIAL - Abnormal; Notable for the following:        Result Value    RBC 4.30 (*)     Hemoglobin 13.9 (*)     MCH 32.3 (*)     All other components within normal limits   COMPREHENSIVE METABOLIC PANEL - Abnormal; Notable for the following:     BUN, Bld 27 (*)     Creatinine 1.5 (*)     eGFR if  56.1 (*)     eGFR if non  48.5 (*)     All other components within normal limits   MAGNESIUM   CK     EKG Readings: (Independently Interpreted)   Heart rate 60. Paced rhythm        X-Rays:   Independently Interpreted Readings:   Head CT: No intracranial process   Other Readings:  X-Rays of foot/ankle- no fracture noted    X-Ray shoulder - no fracture noted    Medical Decision Making:   History:   Old Medical Records: I decided to obtain old medical records.  Initial Assessment:   66 year old with significant history of heart disease with defibrillator, who did have an episode yesterday that was possibly secondary to unconsciousness where he fell to the ground and landed on his back, now complaining of shoulder pain, ankle/foot pain, and mild headache. He was seen in Cardiology department and defibrillator was evaluated, which showed no abnormalities. Patient comes down here for continued complaints.  Differential Diagnosis:   Doubt intracranial process. Suspicious for shoulder injury and ankle/foot injury  Independently Interpreted Test(s):   I have ordered and independently interpreted X-rays - see prior notes.  I have ordered and independently interpreted EKG Reading(s) - see prior notes  Clinical Tests:   Lab Tests: Ordered and  Reviewed  Radiological Study: Ordered and Reviewed  Medical Tests: Ordered and Reviewed  ED Management:  Will get blood work, EKG, imaging including X-Rays and CT head            Scribe Attestation:   Scribe #1: I performed the above scribed service and the documentation accurately describes the services I performed. I attest to the accuracy of the note.    Attending Attestation:           Physician Attestation for Scribe:  Physician Attestation Statement for Scribe #1: I, Dr. Romero, reviewed documentation, as scribed by Kenia Fields in my presence, and it is both accurate and complete.         Attending ED Notes:   66 year old male with episode of unconsciousness yesterday evening, evaluated not only in Cardiology clinic for defibrillator function but also in ED with blood work and imaging, which were all negative. Labs reviewed with no abnormalities noted. Patient will be reassured and is discharged.          ED Course     Clinical Impression:   Diagnoses of Fall, Fall, and Fall were pertinent to this visit.    Disposition:   Disposition: Discharged  Condition: Stable       Eber Romero MD  05/29/17 [id number]

## 2017-05-05 NOTE — ED NOTES
"GENERAL: The patient is a well-developed, well-nourished male in no apparent distress. He is alert and oriented x3.    VITAL SIGNS: Blood pressure 122/69, pulse 67, temperature 98.2 °F (36.8 °C), temperature source Oral, resp. rate 18, height 5' 7" (1.702 m), weight 104.3 kg (230 lb), SpO2 98 %.    HEENT: Head is normocephalic and atraumatic. Extraocular muscles are intact. Pupils are equal, round, and reactive to light and accommodation. Nares appeared normal. Mouth is well hydrated and without lesions. Mucous membranes are moist. Posterior pharynx clear of any exudate or lesions.    NECK: Supple. No carotid bruits. No lymphadenopathy or thyromegaly.    LUNGS: Clear to auscultation.    HEART: Regular rate and rhythm without murmur. Defibrillator noted left subclavian.     ABDOMEN: Soft, nontender, and nondistended. Positive bowel sounds. No hepatosplenomegaly was noted.     EXTREMITIES: Without any cyanosis, clubbing, rash, lesions or edema.     NEUROLOGIC: Cranial nerves II through XII are grossly intact.     PSYCHIATRIC: Flat affect, but denies suicidal or homicidal ideations.    SKIN: No ulceration or induration present.  "

## 2017-05-05 NOTE — ED TRIAGE NOTES
Presents to ER to be evaluated status post fall yesterday.  C/O pain to back of his head, neck, right shoulder and right foot.  No contusions, abrasions, or deformity noted.

## 2017-05-05 NOTE — ED AVS SNAPSHOT
[facility]  [address]  [facility] [address]               Audrey Oneil    2017 11:59 AM   ED    Description:  Male : 1950   Department:  [facility]           Your Care was Coordinated By:     Provider Role From To    Eber Romero MD Attending Provider 17 1201 --      Reason for Visit     Shoulder Injury           Diagnoses this Visit        Comments    Contusion of right shoulder, initial encounter    -  Primary     Fall         Sprain of foot, right, initial encounter           ED Disposition     ED Disposition Condition Comment    Discharge             To Do List           Follow-up Information     Follow up with Primary Doctor No.    Why:  As needed      [facility]     [facility] Care Line -  Assistance  Unless otherwise directed by your provider, please contact [facility], our nurse care line that is available for  assistance.     Registered nurses in the [facility] provide: appointment scheduling, clinical advisement, health education, and other advisory services.  Call: [phone] (toll free)               Medications           Message regarding Medications     Verify the changes and/or additions to your medication regime listed below are the same as discussed with your clinician today.  If any of these changes or additions are incorrect, please notify your healthcare provider.             Verify that the below list of medications is an accurate representation of the medications you are currently taking.  If none reported, the list may be blank. If incorrect, please contact your healthcare provider. Carry this list with you in case of emergency.           Current Medications     amiodarone (PACERONE) 200 MG Tab Take 1.5 tablets (300 mg total) by mouth once daily.    aspirin (ECOTRIN) 325 MG EC tablet Take 325 mg by mouth once daily.    atorvastatin (LIPITOR) 80 MG  "tablet TAKE 1 TABLET(80 MG) BY MOUTH EVERY DAY    clopidogrel (PLAVIX) 75 mg tablet Take 75 mg by mouth once daily.    dexlansoprazole (DEXILANT) 60 mg capsule Take 60 mg by mouth once daily.    docusate sodium (COLACE) 50 MG capsule Take 1 capsule (50 mg total) by mouth once daily.    furosemide (LASIX) 40 MG tablet TAKE 1 TABLET BY MOUTH EVERY DAY    hydrocodone-acetaminophen 5-325mg (NORCO) 5-325 mg per tablet Take 1 tablet by mouth every 4 (four) hours as needed for Pain.    lisinopril (PRINIVIL,ZESTRIL) 5 MG tablet Take 1 tablet (5 mg total) by mouth once daily.    metoprolol succinate (TOPROL-XL) 50 MG 24 hr tablet TAKE 1 TABLET BY MOUTH ONCE DAILY    spironolactone (ALDACTONE) 25 MG tablet TAKE 1 TABLET(25 MG) BY MOUTH EVERY DAY    nitroGLYCERIN (NITROSTAT) 0.4 MG SL tablet Place 1 tablet (0.4 mg total) under the tongue every 5 (five) minutes as needed for Chest pain.           Clinical Reference Information           Your Vitals Were     BP Pulse Temp Resp Height Weight    118/62 (BP Location: Left arm, Patient Position: Sitting, BP Method: Automatic) 60 97.7 °F (36.5 °C) (Oral) 18 5' 7" (1.702 m) 104.3 kg (230 lb)    SpO2 BMI             96% 36.02 kg/m2         Allergies as of 5/5/2017        Reactions    Iodine Containing Multivitamin     Keflex [Cephalexin]     Peaches [Peach (Prunus Persica)]     Shellfish Containing Products     Tuberculin Shital Test Ppd     Fig Tree Rash      Immunizations Administered on Date of Encounter - 5/5/2017     None      ED Micro, Lab, POCT     Start Ordered       Status Ordering Provider    05/05/17 1211 05/05/17 1212  CPK  STAT      Final result     05/05/17 1210 05/05/17 1212  Magnesium  Once      Final result     05/05/17 1209 05/05/17 1212  CBC auto differential  STAT      Final result     05/05/17 1209 05/05/17 1212  Comprehensive metabolic panel  STAT      Final result       ED Imaging Orders     Start Ordered       Status Ordering Provider    05/05/17 1212 05/05/17 " 1212  X-Ray Shoulder Trauma Right  1 time imaging      Final result     05/05/17 1212 05/05/17 1212  X-Ray Foot Complete Right  1 time imaging      Final result     05/05/17 1212 05/05/17 1212  X-Ray Ankle Complete Right  1 time imaging      Final result     05/05/17 1209 05/05/17 1212  CT Head Without Contrast  1 time imaging      Final result         Discharge Instructions       Continue with tylenol for the pain  Heat to your shoulder    Your Scheduled Appointments     Jun 29, 2017  8:00 AM CDT   Remote Interrogation with HOME MONITOR DEVICE CHECK, [facility]   [facility] ([facility] )    [address]  [facility] [id number] [phone]              [facility] Sign-Up     Activating your [facility] account is as easy as 1-2-3!     1) Visit [email or web address], select Sign Up Now, enter this activation code and your date of birth, then select Next.  Activation code not generated  Current Patient Portal Status: Account disabled      2) Create a username and password to use when you visit [facility] in the future and select a security question in case you lose your password and select Next.    3) Enter your e-mail address and click Sign Up!    Additional Information  If you have questions, please e-mail [email or web address] or call [phone] to talk to our [facility] staff. Remember, [facility] is NOT to be used for urgent needs. For medical emergencies, dial 911.         Smoking Cessation     If you would like to quit smoking:   You may be eligible for free services if you are a [place] resident and started smoking cigarettes before September 1, 1988.  Call the Smoking Cessation Trust (SCT) toll free at (930) 788-5613 or (625) 965-3487.   Call 9-800-QUIT-NOW if you do not meet the above criteria.   Contact us via email: [email or web address]   View our website for more information: [email or web address]         [facility] complies with applicable Federal civil  rights laws and does not discriminate on the basis of race, color, national origin, age, disability, or sex.        Language Assistance Services     ATTENTION: Language assistance services are available, free of charge. Please call [phone].      ATENCIÓN: Si habla gustavo, tiene a garcia disposición servicios gratuitos de asistencia lingüística. Llame al [phone].     CHÚ Ý: N?u b?n nói Ti?ng Vi?t, có các d?ch v? h? tr? ngôn ng? mi?n phí dành cho b?n. G?i s? [phone].

## 2017-05-15 RX ORDER — FUROSEMIDE 40 MG/1
TABLET ORAL
Qty: 30 TABLET | Refills: 0 | OUTPATIENT
Start: 2017-05-15

## 2017-05-21 RX ORDER — FUROSEMIDE 40 MG/1
TABLET ORAL
Qty: 30 TABLET | Refills: 0 | Status: ON HOLD | OUTPATIENT
Start: 2017-05-21 | End: 2017-07-10 | Stop reason: SDUPTHER

## 2017-05-22 RX ORDER — METOPROLOL SUCCINATE 50 MG/1
TABLET, EXTENDED RELEASE ORAL
Qty: 90 TABLET | Refills: 0 | Status: SHIPPED | OUTPATIENT
Start: 2017-05-22 | End: 2017-06-01 | Stop reason: SDUPTHER

## 2017-05-22 RX ORDER — METOPROLOL SUCCINATE 50 MG/1
TABLET, EXTENDED RELEASE ORAL
Qty: 90 TABLET | Refills: 3 | Status: SHIPPED | OUTPATIENT
Start: 2017-05-22 | End: 2019-04-15 | Stop reason: SDUPTHER

## 2017-06-01 ENCOUNTER — OFFICE VISIT (OUTPATIENT)
Dept: INTERNAL MEDICINE | Facility: CLINIC | Age: 67
End: 2017-06-01
Payer: MEDICARE

## 2017-06-01 VITALS — WEIGHT: 236.75 LBS | BODY MASS INDEX: 37.16 KG/M2 | HEIGHT: 67 IN

## 2017-06-01 DIAGNOSIS — E78.5 HYPERLIPIDEMIA LDL GOAL <70: ICD-10-CM

## 2017-06-01 DIAGNOSIS — Z01.00 EYE EXAM, ROUTINE: ICD-10-CM

## 2017-06-01 DIAGNOSIS — I50.42 CHRONIC COMBINED SYSTOLIC AND DIASTOLIC HEART FAILURE: Chronic | ICD-10-CM

## 2017-06-01 DIAGNOSIS — Z13.6 SCREENING FOR AAA (ABDOMINAL AORTIC ANEURYSM): ICD-10-CM

## 2017-06-01 DIAGNOSIS — Z00.00 ENCOUNTER FOR PREVENTIVE HEALTH EXAMINATION: Primary | ICD-10-CM

## 2017-06-01 DIAGNOSIS — I11.0 HYPERTENSIVE HEART DISEASE WITH HEART FAILURE: ICD-10-CM

## 2017-06-01 DIAGNOSIS — N18.30 CKD (CHRONIC KIDNEY DISEASE), STAGE III: ICD-10-CM

## 2017-06-01 DIAGNOSIS — R33.9 URINARY RETENTION: ICD-10-CM

## 2017-06-01 DIAGNOSIS — I10 ESSENTIAL HYPERTENSION: ICD-10-CM

## 2017-06-01 DIAGNOSIS — Z79.899 HIGH RISK MEDICATIONS (NOT ANTICOAGULANTS) LONG-TERM USE: ICD-10-CM

## 2017-06-01 DIAGNOSIS — Z86.718 HISTORY OF DVT (DEEP VEIN THROMBOSIS): Chronic | ICD-10-CM

## 2017-06-01 DIAGNOSIS — Z86.711 HX PULMONARY EMBOLISM: ICD-10-CM

## 2017-06-01 DIAGNOSIS — E66.01 SEVERE OBESITY (BMI 35.0-39.9) WITH COMORBIDITY: Chronic | ICD-10-CM

## 2017-06-01 DIAGNOSIS — Z11.59 NEED FOR HEPATITIS C SCREENING TEST: ICD-10-CM

## 2017-06-01 DIAGNOSIS — I70.0 ATHEROSCLEROSIS OF AORTA: ICD-10-CM

## 2017-06-01 DIAGNOSIS — Z12.11 COLON CANCER SCREENING: ICD-10-CM

## 2017-06-01 DIAGNOSIS — I25.10 CORONARY ARTERY DISEASE INVOLVING NATIVE CORONARY ARTERY OF NATIVE HEART WITHOUT ANGINA PECTORIS: ICD-10-CM

## 2017-06-01 DIAGNOSIS — Z23 NEED FOR PNEUMOCOCCAL VACCINATION: ICD-10-CM

## 2017-06-01 PROCEDURE — 99499 UNLISTED E&M SERVICE: CPT | Mod: S$GLB,,, | Performed by: NURSE PRACTITIONER

## 2017-06-01 PROCEDURE — G0402 INITIAL PREVENTIVE EXAM: HCPCS | Mod: S$GLB,,, | Performed by: NURSE PRACTITIONER

## 2017-06-01 PROCEDURE — 90670 PCV13 VACCINE IM: CPT | Mod: S$GLB,,, | Performed by: NURSE PRACTITIONER

## 2017-06-01 PROCEDURE — G0009 ADMIN PNEUMOCOCCAL VACCINE: HCPCS | Mod: S$GLB,,, | Performed by: NURSE PRACTITIONER

## 2017-06-01 PROCEDURE — 99999 PR PBB SHADOW E&M-EST. PATIENT-LVL V: CPT | Mod: PBBFAC,,, | Performed by: NURSE PRACTITIONER

## 2017-06-01 NOTE — PATIENT INSTRUCTIONS
Counseling and Referral of Other Preventative  (Italic type indicates deductible and co-insurance are waived)    Patient Name: Audrey Oneil  Today's Date: 6/1/2017      SERVICE LIMITATIONS RECOMMENDATION    Vaccines    · Pneumococcal (once after 65)    · Influenza (annually)    · Hepatitis B (if medium/high risk)    · Prevnar 13      Hepatitis B medium/high risk factors:       - End-stage renal disease       - Hemophiliacs who received Factor VII or         IX concentrates       - Clients of institutions for the mentally             retarded       - Persons who live in the same house as          a HepB carrier       - Homosexual men       - Illicit injectable drug abusers     Pneumococcal: N/A     Influenza: N/A     Hepatitis B: N/A     Prevnar 13: Scheduled - see appointments    Prostate cancer screening (annually to age 75)     Prostate specific antigen (PSA) Shared decision making with Provider. Sometimes a co-pay may be required if the patient decides to have this test. The USPSTF no longer recommends prostate cancer screening routinely in medicine: every as instructed by Urology, appt set today    Colorectal cancer screening (to age 75)    · Fecal occult blood test (annual)  · Flexible sigmoidoscopy (5y)  · Screening colonoscopy (10y)  · Barium enema   Scheduled, see appointments    Diabetes self-management training (no USPSTF recommendations)  Requires referral by treating physician for patient with diabetes or renal disease. 10 hours of initial DSMT sessions of no less than 30 minutes each in a continuous 12-month period. 2 hours of follow-up DSMT in subsequent years.  N/A    Glaucoma screening (no USPSTF recommendation)  Diabetes mellitus, family history   , age 50 or over    American, age 65 or over  Recommended to patient, declined    Medical nutrition therapy for diabetes or renal disease (no recommended schedule)  Requires referral by treating physician for patient with  diabetes or renal disease or kidney transplant within the past 3 years.  Can be provided in same year as diabetes self-management training (DSMT), and CMS recommends medical nutrition therapy take place after DSMT. Up to 3 hours for initial year and 2 hours in subsequent years.  N/A    Cardiovascular screening blood tests (every 5 years)  · Fasting lipid panel  Order as a panel if possible  Done this year, repeat every year    Diabetes screening tests (at least every 3 years, Medicare covers annually or at 6-month intervals for prediabetic patients)  · Fasting blood sugar (FBS) or glucose tolerance test (GTT)  Patient must be diagnosed with one of the following:       - Hypertension       - Dyslipidemia       - Obesity (BMI 30kg/m2)       - Previous elevated impaired FBS or GTT       ... or any two of the following:       - Overweight (BMI 25 but <30)       - Family history of diabetes       - Age 65 or older       - History of gestational diabetes or birth of baby weighing more than 9 pounds  Scheduled, see appointments    Abdominal aortic aneurysm screening (once)  · Sonogram   Limited to patients who meet one of the following criteria:       - Men who are 65-75 years old and have smoked more than 100 cigarette in their lifetime       - Anyone with a family history of abdominal aortic aneurysm       - Anyone recommended for screening by the USPSTF  Scheduled, see appointments    HIV screening (annually for increased risk patients)  · HIV-1 and HIV-2 by EIA, or JARED, rapid antibody test or oral mucosa transudate  Patients must be at increased risk for HIV infection per USPSTF guidelines or pregnant. Tests covered annually for patient at increased risk or as requested by the patient. Pregnant patients may receive up to 3 tests during pregnancy.  Risks discussed, screening is not recommended    Smoking cessation counseling (up to 8 sessions per year)  Patients must be asymptomatic of tobacco-related conditions to  receive as a preventative service.  Non-smoker    Subsequent annual wellness visit  At least 12 months since last AWV  Return in one year     The following information is provided to all patients.  This information is to help you find resources for any of the problems found today that may be affecting your health:                Living healthy guide: www.[facility].[place].[facility]      Understanding Diabetes: www.diabetes.org      Eating healthy: www.cdc.gov/healthyweight      CDC home safety checklist: www.cdc.gov/steadi/patient.html      Agency on Aging: www.goea.[place].[facility]      Alcoholics anonymous (AA): www.aa.org      Physical Activity: www.felix.nih.gov/pa8haeu      Tobacco use: www.quitwithusla.org

## 2017-06-02 PROBLEM — I70.0 ATHEROSCLEROSIS OF AORTA: Status: ACTIVE | Noted: 2017-06-02

## 2017-06-02 PROBLEM — N18.30 CKD (CHRONIC KIDNEY DISEASE), STAGE III: Status: ACTIVE | Noted: 2017-06-02

## 2017-06-02 NOTE — PROGRESS NOTES
"Audrey Oneil presented for a  Medicare AWV and comprehensive Health Risk Assessment today. The following components were reviewed and updated:    · Medical history  · Family History  · Social history  · Allergies and Current Medications  · Health Risk Assessment  · Health Maintenance  · Care Team     ** See Completed Assessments for Annual Wellness Visit within the encounter summary.**       The following assessments were completed:  · Living Situation  · CAGE  · Depression Screening  · Timed Get Up and Go  · Whisper Test  · Cognitive Function Screening  · Nutrition Screening  · ADL Screening  · PAQ Screening    Vitals:    06/01/17 0957   Weight: 107.4 kg (236 lb 12.4 oz)   Height: 5' 7" (1.702 m)     Body mass index is 37.08 kg/m².  Physical Exam   Constitutional: He is oriented to person, place, and time. He appears well-developed.   obese   HENT:   Head: Normocephalic and atraumatic.   Nose: Nose normal.   Mouth/Throat: Dental caries present.   Poor dentition   Eyes: Conjunctivae and EOM are normal.   Neck: Normal range of motion. Neck supple.   Cardiovascular: Normal rate, regular rhythm, normal heart sounds and intact distal pulses.    Pulmonary/Chest: Effort normal and breath sounds normal.   Abdominal: Soft. Bowel sounds are normal. He exhibits distension.   Musculoskeletal: Normal range of motion.   Neurological: He is alert and oriented to person, place, and time.   Skin: Skin is warm and dry.   Psychiatric: He has a normal mood and affect. His behavior is normal. Judgment and thought content normal.   Nursing note and vitals reviewed.        Diagnoses and health risks identified today and associated recommendations/orders:    1. Encounter for preventive health examination  Assessment performed. Health maintenance updated. Chart review completed.    2. Atherosclerosis of aorta  Stable on imaging. Noted 6/6/2016. Followed by Cardiology.    3. Chronic combined systolic and diastolic heart failure  Chronic. " Continue current medication regimen. Followed by Cardiology.    4. Hypertensive heart disease with heart failure  Chronic. Continue current medication regimen. Followed by Cardiology.    5. Coronary artery disease involving native coronary artery of native heart without angina pectoris  Chronic. Continue current medication regimen. Followed by Cardiology.    6. Severe obesity (BMI 35.0-39.9) with comorbidity  BMI 37 Followed by Cardiology. Discussed weight loss.    7. Screening for AAA (abdominal aortic aneurysm)  - US Abdominal Aorta; Future    8. Need for pneumococcal vaccination  - Pneumococcal Conjugate Vaccine (13 Valent) (IM)    9. Colon cancer screening  - Case request GI: COLONOSCOPY    10. Eye exam, routine  - Ambulatory Referral to Optometry    11. Essential hypertension  Stable with medication. Followed by Cardiology.    12. Need for hepatitis C screening test  Ordered.    13. Hyperlipidemia LDL goal <70  Stable with medication. Followed by Cardiology.    14. Urinary retention  - Ambulatory Referral to Urology    15. Hx pulmonary embolism 2007 (estimate)  Stable.     16. History of DVT (deep vein thrombosis)  Stable with medication. Followed by Cardiology.    17. High risk medications (amiodarone) long-term use  Stable with medication. Followed by Cardiology.    18. CKD (chronic kidney disease), stage III  Last creatinine 1.5. Followed by Cardiology.      Provided Audrey with a 5-10 year written screening schedule and personal prevention plan. Recommendations were developed using the USPSTF age appropriate recommendations. Education, counseling, and referrals were provided as needed. After Visit Summary printed and given to patient which includes a list of additional screenings\tests needed.    Return for follow up with Primary Care Provider as instructed, ;sooner if problems, HRA in 1 year.    WENDY Domingo

## 2017-06-09 ENCOUNTER — HOSPITAL ENCOUNTER (OUTPATIENT)
Dept: CARDIOLOGY | Facility: CLINIC | Age: 67
Discharge: HOME | End: 2017-06-09
Payer: MEDICARE

## 2017-06-09 DIAGNOSIS — I50.22 CHRONIC SYSTOLIC CONGESTIVE HEART FAILURE: ICD-10-CM

## 2017-06-09 LAB
DIASTOLIC DYSFUNCTION: YES
ESTIMATED PA SYSTOLIC PRESSURE: 21.34
MITRAL VALVE MOBILITY: NORMAL
MITRAL VALVE REGURGITATION: ABNORMAL
RETIRED EF AND QEF - SEE NOTES: 10 (ref 55–65)
TRICUSPID VALVE REGURGITATION: ABNORMAL

## 2017-06-09 PROCEDURE — 96374 THER/PROPH/DIAG INJ IV PUSH: CPT | Mod: NTX,S$GLB,, | Performed by: INTERNAL MEDICINE

## 2017-06-09 PROCEDURE — 93306 TTE W/DOPPLER COMPLETE: CPT | Mod: NTX,S$GLB,, | Performed by: INTERNAL MEDICINE

## 2017-06-09 NOTE — PROGRESS NOTES
Consent obtained. 22 g sl started in right forearm for optison use. optison given ivp via sl for imaging. Denies transfusion rxn. Tolerated well. Sl d/oliver after. Pressure applied.

## 2017-06-12 ENCOUNTER — OFFICE VISIT (OUTPATIENT)
Dept: TRANSPLANT | Facility: CLINIC | Age: 67
End: 2017-06-12
Payer: MEDICARE

## 2017-06-12 ENCOUNTER — LAB VISIT (OUTPATIENT)
Dept: LAB | Facility: HOSPITAL | Age: 67
End: 2017-06-12
Payer: MEDICARE

## 2017-06-12 ENCOUNTER — OUTPATIENT CASE MANAGEMENT (OUTPATIENT)
Dept: ADMINISTRATIVE | Facility: OTHER | Age: 67
End: 2017-06-12

## 2017-06-12 VITALS
BODY MASS INDEX: 37.99 KG/M2 | HEIGHT: 67 IN | SYSTOLIC BLOOD PRESSURE: 102 MMHG | HEART RATE: 65 BPM | WEIGHT: 242.06 LBS | DIASTOLIC BLOOD PRESSURE: 57 MMHG

## 2017-06-12 DIAGNOSIS — I10 ESSENTIAL HYPERTENSION: ICD-10-CM

## 2017-06-12 DIAGNOSIS — I25.5 CARDIOMYOPATHY, ISCHEMIC: Chronic | ICD-10-CM

## 2017-06-12 DIAGNOSIS — Z95.810 AICD (AUTOMATIC CARDIOVERTER/DEFIBRILLATOR) PRESENT: ICD-10-CM

## 2017-06-12 DIAGNOSIS — I50.22 CHRONIC SYSTOLIC CONGESTIVE HEART FAILURE: ICD-10-CM

## 2017-06-12 DIAGNOSIS — I25.10 CORONARY ARTERY DISEASE, ANGINA PRESENCE UNSPECIFIED, UNSPECIFIED VESSEL OR LESION TYPE, UNSPECIFIED WHETHER NATIVE OR TRANSPLANTED HEART: ICD-10-CM

## 2017-06-12 DIAGNOSIS — I50.42 CHRONIC COMBINED SYSTOLIC AND DIASTOLIC HEART FAILURE: Primary | Chronic | ICD-10-CM

## 2017-06-12 DIAGNOSIS — N18.30 CKD (CHRONIC KIDNEY DISEASE), STAGE III: ICD-10-CM

## 2017-06-12 DIAGNOSIS — I50.42 CHRONIC COMBINED SYSTOLIC AND DIASTOLIC HEART FAILURE: Chronic | ICD-10-CM

## 2017-06-12 DIAGNOSIS — I25.10 CORONARY ARTERY DISEASE INVOLVING NATIVE CORONARY ARTERY OF NATIVE HEART WITHOUT ANGINA PECTORIS: ICD-10-CM

## 2017-06-12 LAB
ALBUMIN SERPL BCP-MCNC: 3.7 G/DL
ALP SERPL-CCNC: 86 U/L
ALT SERPL W/O P-5'-P-CCNC: 33 U/L
ANION GAP SERPL CALC-SCNC: 9 MMOL/L
AST SERPL-CCNC: 23 U/L
BILIRUB SERPL-MCNC: 0.4 MG/DL
BNP SERPL-MCNC: 57 PG/ML
BUN SERPL-MCNC: 33 MG/DL
CALCIUM SERPL-MCNC: 8.8 MG/DL
CHLORIDE SERPL-SCNC: 107 MMOL/L
CO2 SERPL-SCNC: 26 MMOL/L
CREAT SERPL-MCNC: 1.7 MG/DL
EST. GFR  (AFRICAN AMERICAN): 47.9 ML/MIN/1.73 M^2
EST. GFR  (NON AFRICAN AMERICAN): 41.4 ML/MIN/1.73 M^2
GLUCOSE SERPL-MCNC: 111 MG/DL
POTASSIUM SERPL-SCNC: 4.7 MMOL/L
PROT SERPL-MCNC: 6.7 G/DL
SODIUM SERPL-SCNC: 142 MMOL/L

## 2017-06-12 PROCEDURE — 99214 OFFICE O/P EST MOD 30 MIN: CPT | Mod: NTX,S$GLB,, | Performed by: INTERNAL MEDICINE

## 2017-06-12 PROCEDURE — 99499 UNLISTED E&M SERVICE: CPT | Mod: S$GLB,TXP,, | Performed by: INTERNAL MEDICINE

## 2017-06-12 PROCEDURE — 99999 PR PBB SHADOW E&M-EST. PATIENT-LVL III: CPT | Mod: PBBFAC,TXP,, | Performed by: INTERNAL MEDICINE

## 2017-06-12 RX ORDER — HYDROCODONE BITARTRATE AND ACETAMINOPHEN 7.5; 325 MG/1; MG/1
1 TABLET ORAL EVERY 4 HOURS PRN
COMMUNITY
Start: 2017-05-29 | End: 2018-01-12

## 2017-06-12 RX ORDER — NITROGLYCERIN 0.4 MG/1
0.4 TABLET SUBLINGUAL EVERY 5 MIN PRN
Qty: 30 TABLET | Refills: 6 | Status: SHIPPED | OUTPATIENT
Start: 2017-06-12 | End: 2017-06-12 | Stop reason: SDUPTHER

## 2017-06-12 RX ORDER — NITROGLYCERIN 0.4 MG/1
TABLET SUBLINGUAL
Qty: 350 TABLET | Refills: 0 | Status: SHIPPED | OUTPATIENT
Start: 2017-06-12 | End: 2017-09-13 | Stop reason: SDUPTHER

## 2017-06-12 NOTE — Clinical Note
June 12, 2017        Stephanie Jade             [facility]  [address]  [facility] [address]  Phone: [phone]   Patient: Audrey Oneil Jr.   MR Number: [id number]   YOB: 1950   Date of Visit: 6/12/2017       Dear Dr. Stephanie Jade    Thank you for referring Audrey Oneil to me for evaluation. Attached you will find relevant portions of my assessment and plan of care.    If you have questions, please do not hesitate to call me. I look forward to following Audrey Oneil along with you.    Sincerely,    Edison Marshall MD    Enclosure    If you would like to receive this communication electronically, please contact [email or web address] or [phone] to request [employer] Link access.    [employer] Link is a tool which provides read-only access to select patient information with whom you have a relationship. Its easy to use and provides real time access to review your patients record including encounter summaries, notes, results, and demographic information.    If you feel you have received this communication in error or would no longer like to receive these types of communications, please e-mail [email or web address]

## 2017-06-12 NOTE — PROGRESS NOTES
Subjective:   Transplant status: active    HPI:  Mr. Oneil is a very pleasant 66 yo male with a hx of CAD s/p STEMI(s/p PCI LAD 2007), CHF/ICM and remote MI, quit smoking Dec 2015,  PE (2010, s/p 1-yr coumadin), HTN, DLP and s/p ICD [facility] placement due to VT who comes for a regular follow-up.  I had seen him 3 months ago and was worried that he was declining and his HF symptoms were worsening and therefore I had ordered a repeat  CPX that showed low Peak VO2 of 7.5ml/kg/min but AT was not attained and RER was only 0.67 (poor effort). Most recent 2D echo showed severely depressed LV function with an EF=15%,  LV with a LVEDD of 5.9  cm, normal RV size and function. Clinically reports NYHA class III symptoms. Does not smoke. RHC was ordered but cancelled by the patient as he got scared,  Labs reviewed. Creatinine was 1.5 last month.     Past Medical History:   Diagnosis Date    Chronic anticoagulation 5/5/2016    Chronic combined systolic and diastolic heart failure 11/26/2012    EF 10-20% on ECHO 2013    Clotting disorder     Coronary artery disease involving native coronary artery of native heart without angina pectoris 11/26/2012    Cath 10- Stents patent non-obstructive disease Cath 11-12015 non-obstructive disease     Diverticulosis of colon     DVT (deep venous thrombosis), unspecified laterality 11/12/2015    Essential hypertension 11/15/2015    Hyperlipidemia     Hypertensive heart disease with heart failure 5/5/2016    MI (myocardial infarction) 2009    Nicotine abuse     Obesity 11/26/2012    Pulmonary embolism 2011    Stented coronary artery 11/26/2012    LAD stent placed 10/17/2007      Past Surgical History:   Procedure Laterality Date    APPENDECTOMY      BACK SURGERY      CARDIAC SURGERY      stent    r knee scope      SPINE SURGERY      TONSILLECTOMY         Review of Systems   Constitution: Positive for weight gain. Negative for chills, decreased appetite, diaphoresis,  "fever, weakness, malaise/fatigue, night sweats and weight loss.   Eyes: Negative.    Cardiovascular: Positive for dyspnea on exertion and orthopnea. Negative for chest pain, claudication, cyanosis, irregular heartbeat, leg swelling, near-syncope, palpitations, paroxysmal nocturnal dyspnea and syncope.   Respiratory: Negative for cough, hemoptysis and shortness of breath.    Endocrine: Negative.    Hematologic/Lymphatic: Negative.    Skin: Negative for color change, dry skin and nail changes.   Musculoskeletal: Negative.    Gastrointestinal: Negative.    Genitourinary: Negative.        Objective:   Blood pressure (!) 102/57, pulse 65, height 5' 7" (1.702 m), weight 109.8 kg (242 lb 1 oz).body mass index is 37.91 kg/m².  Physical Exam   Constitutional: He appears well-developed.   BP (!) 102/57   Pulse 65   Ht 5' 7" (1.702 m)   Wt 109.8 kg (242 lb 1 oz)   BMI 37.91 kg/m²      HENT:   Head: Normocephalic.   Neck: JVD present. Carotid bruit is not present.   Cardiovascular: Regular rhythm and normal heart sounds.  PMI is displaced.    No murmur heard.  Pulmonary/Chest: Effort normal and breath sounds normal. No respiratory distress. He has no wheezes. He has no rales.   Abdominal: Soft. Bowel sounds are normal. He exhibits distension. There is no tenderness. There is no rebound.   Musculoskeletal: He exhibits no edema.   Warm extremities   Neurological: He is alert.   Skin: Skin is warm.   Vitals reviewed.      Labs:    Chemistry        Component Value Date/Time     05/05/2017 1220    K 5.1 05/05/2017 1220     05/05/2017 1220    CO2 27 05/05/2017 1220    BUN 27 (H) 05/05/2017 1220    CREATININE 1.5 (H) 05/05/2017 1220     05/05/2017 1220        Component Value Date/Time    CALCIUM 9.2 05/05/2017 1220    ALKPHOS 82 05/05/2017 1220    AST 22 05/05/2017 1220    ALT 31 05/05/2017 1220    BILITOT 0.7 05/05/2017 1220          Magnesium   Date Value Ref Range Status   05/05/2017 2.4 1.6 - 2.6 mg/dL Final "     Lab Results   Component Value Date    WBC 9.04 05/05/2017    HGB 13.9 (L) 05/05/2017    HCT 41.7 05/05/2017     05/05/2017     Lab Results   Component Value Date    INR 0.9 12/08/2016    INR 0.9 07/03/2016    INR 1.1 12/06/2015     BNP   Date Value Ref Range Status   04/14/2017 81 0 - 99 pg/mL Final     Comment:     Values of less than 100 pg/ml are consistent with non-CHF populations.   03/30/2017 63 0 - 99 pg/mL Final     Comment:     Values of less than 100 pg/ml are consistent with non-CHF populations.   02/02/2017 158 (H) 0 - 99 pg/mL Final     Comment:     Values of less than 100 pg/ml are consistent with non-CHF populations.     No results found for: LDH  No results found for this or any previous visit.  No results found for this or any previous visit.    Assessment:      1. Chronic combined systolic and diastolic heart failure    2. Cardiomyopathy, ischemic    3. Coronary artery disease involving native coronary artery of native heart without angina pectoris    4. AICD (automatic cardioverter/defibrillator) present    5. CKD (chronic kidney disease), stage III    6. Essential hypertension        Plan:   65 y/o male with ICMP, HFrEF stage D with NYHA class III symptoms  He has not had his RHC done yet (aptient got scared). Will plan for a RHC with me in the third week of June.   Recommend 2 gram sodium restriction and 1500cc fluid restriction.  Encourage physical activity with graded exercise program.  Requested patient to weigh themselves daily, and to notify us if their weight increases by more than 3 lbs in 1 day or 5 lbs in 1 week.   RTC in 3 months or earlier depending on his RHC results.      Edison Marshall MD

## 2017-06-13 NOTE — PROGRESS NOTES
Thank you for the referral.     For your information:    The following patient has been assigned to Aliyah Sutton RN  with Outpatient Complex Care Management for high risk screening.    Reason: High Risk    Please contact [facility] at [phone] with any questions.    Thank you,      Joyce Hurst, SSC

## 2017-06-14 ENCOUNTER — OUTPATIENT CASE MANAGEMENT (OUTPATIENT)
Dept: ADMINISTRATIVE | Facility: OTHER | Age: 67
End: 2017-06-14

## 2017-06-14 ENCOUNTER — HOSPITAL ENCOUNTER (OUTPATIENT)
Dept: RADIOLOGY | Facility: HOSPITAL | Age: 67
Discharge: HOME | End: 2017-06-14
Attending: NURSE PRACTITIONER
Payer: MEDICARE

## 2017-06-14 DIAGNOSIS — Z13.6 SCREENING FOR AAA (ABDOMINAL AORTIC ANEURYSM): ICD-10-CM

## 2017-06-14 PROCEDURE — 76775 US EXAM ABDO BACK WALL LIM: CPT | Mod: 26,NTX,, | Performed by: RADIOLOGY

## 2017-06-14 PROCEDURE — 76775 US EXAM ABDO BACK WALL LIM: CPT | Mod: TC,TXP

## 2017-06-15 ENCOUNTER — TELEPHONE (OUTPATIENT)
Dept: ADMINISTRATIVE | Facility: HOSPITAL | Age: 67
End: 2017-06-15

## 2017-06-16 RX ORDER — CLOPIDOGREL BISULFATE 75 MG/1
TABLET ORAL
Qty: 90 TABLET | Refills: 0 | Status: ON HOLD | OUTPATIENT
Start: 2017-06-16 | End: 2017-07-10 | Stop reason: SDUPTHER

## 2017-06-19 RX ORDER — ATORVASTATIN CALCIUM 80 MG/1
TABLET, FILM COATED ORAL
Qty: 90 TABLET | Refills: 0 | Status: ON HOLD | OUTPATIENT
Start: 2017-06-19 | End: 2017-07-10 | Stop reason: SDUPTHER

## 2017-06-20 ENCOUNTER — OUTPATIENT CASE MANAGEMENT (OUTPATIENT)
Dept: ADMINISTRATIVE | Facility: OTHER | Age: 67
End: 2017-06-20

## 2017-06-20 NOTE — PROGRESS NOTES
"6/20/17  CM attempt to screen for Outpatient Care Management services. Listed ph # for pt clicks off. Verbal message left for his listed contact/sister TEL:  [phone] with request for return call. Pending [facility] 6/28/17.    12:33 pm CM received incoming call back from Mr. Thad Jr's sister who hands phone over to pt. He is interested in Outpatient Case Management stating he is in need of a way to weigh and check his BP at home. He is not able to talk much now and asks to call him tomorrow when he is in a place he can more readily answer questions. CM is provided with the following ph # to reach pt --[phone] (pt states his "gov phone is out of minutes"). Plan is to call pt at 9 am tomorrow 6/20/17.             "

## 2017-06-21 ENCOUNTER — OUTPATIENT CASE MANAGEMENT (OUTPATIENT)
Dept: ADMINISTRATIVE | Facility: OTHER | Age: 67
End: 2017-06-21

## 2017-06-21 NOTE — PATIENT INSTRUCTIONS
What is Heart Failure?  The heart is a muscle that pumps oxygen-rich blood to all parts of the body. When you have heart failure, the heart is not able to pump as well as it should. Blood and fluid may back up into the lungs, and some parts of the body dont get enough oxygen-rich blood to work normally. These problems lead to the symptoms you feel.  When you have heart failure  With heart failure, not enough oxygen-rich blood leaves the heart with each beat. There are 2 types of heart failure. Both affect the ventricles ability to pump blood. You may have 1 or both types.       Systolic heart failure. The heart muscle becomes weak and enlarged. It cant pump enough oxygen-rich blood forward to the rest of the body when the ventricles contract. The measurement of how much blood your heart pushes out when it beats is called ejection fraction. In systolic heart failure, the ejection fraction is lower than normal. This can cause blood to back up into the lungs and cause shortness of breath and eventually ankle swelling (edema).  This is also called heart failure with reduced ejection fraction, or  HFrEF.    Diastolic heart failure. The heart muscle becomes stiff. It doesnt relax normally between contractions, which keeps the ventricles from filling with blood. Ejection fraction is often in the normal range. This can still lead to the backup of blood into the body and affect the organs such as the liver. This is also called heart failure with preserved ejection fraction or HFpEF.     Recognizing heart failure symptoms  When you have heart failure, you need to pay close attention to your body and how you feel, every single day. That way, if a problem occurs, you can get help before it becomes too severe. You'll need to watch for changes in your symptoms. As long as symptoms stay about the same from one day to the next, your heart failure is stable. But if symptoms start to get worse, it's time to take action.  Signs  and symptoms of worsening heart failure include:  · Rapid weight gain  · Shortness of breath  · Swelling (edema)  · Fatigue  How heart failure affects your body  When the heart doesn't pump enough blood, hormones (body chemicals) are sent to increase the amount of work the heart does. Some hormones make the heart grow larger. Others tell the heart to pump faster. As a result, the heart may pump more blood at first, but it can't keep up with the ongoing demands. So, the heart muscle becomes even more weak. Over time, even less blood is pumped through the heart. This leads to problems throughout the body as organs began to feel the effects of a long-term lack of oxygen. Eventually, if untreated, this can cause problems with your lungs, liver, kidneys, and loss of elasticity in the skin, and skin changes in the lower legs. A weak heart itself can eventually cause a severe decline in health and possible death if left untreated.  What is ejection fraction?  Ejection fraction (EF) measures how much blood the heart pumps out (ejects). This is measured to help diagnose heart failure. A healthy heart pumps at least half of the blood from the ventricles with each beat. This means a normal ejection fraction is around 50% to 70%. Your doctor will calculate ejection fraction from an echocardiogram.     My ejection fraction     Date: ______________________  Ejection fraction: _____________  Test used: __________________  Date Last Reviewed: 3/15/2016  © 6760-6946 [employer]. [address] [place] [address]. All rights reserved. This information is not intended as a substitute for professional medical care. Always follow your healthcare professional's instructions.        Heart Failure: Making Changes to Your Diet  You have a condition called heart failure. When you have heart failure, excess fluid is more likely to build up in your body because your heart isn't working well. This makes the heart work  harder to pump blood. Fluid buildup causes symptoms such as shortness of breath and swelling (edema). This is often referred to as congestive heart failure or CHF. Controlling the amount of salt (sodium) you eat may help stop fluid from building up. Your doctor may also tell you to reduce the amount of fluid you drink.  Reading food labels    Your healthcare provider will tell you how much sodium you can eat each day. Read food labels to keep track. Keep in mind that certain foods are high in salt. These include canned, frozen, and processed foods. Check the amount of sodium in each serving. Watch out for high-sodium ingredients. These include MSG (monosodium glutamate), baking soda, and sodium phosphate.   Eating less salt  Give yourself time to get used to eating less salt. It may take a little while. Here are some tips to help:  · Take the saltshaker off the table. Replace it with salt-free herb mixes and spices.  · Eat fresh or plain frozen vegetables. These have much less salt than canned vegetables.  · Choose low-sodium snacks like sodium-free pretzels, crackers, or air-popped popcorn.  · Dont add salt to your food when youre cooking. Instead, season your foods with pepper, lemon, garlic, or onion.  · When you eat out, ask that your food be cooked without added salt.  · Avoid eating fried foods as these often have a great deal of salt.  If youre told to limit fluids  You may need to limit how much fluid you have to help prevent swelling. This includes anything that is liquid at room temperature, such as ice cream and soup. If your doctor tells you to limit fluid, try these tips:  · Measure drinks in a measuring cup before you drink them. This will help you meet daily goals.  · Chill drinks to make them more refreshing.  · Suck on frozen lemon wedges to quench thirst.  · Only drink when youre thirsty.  · Chew sugarless gum or suck on hard candy to keep your mouth moist.  · Weigh yourself daily to know if  your body's fluid content is rising.  My sodium goal  Your healthcare provider may give you a sodium goal to meet each day. This includes sodium found in food as well as salt that you add. My goal is to eat no more than ___________ mg of sodium per day.     When to call your doctor  Call your doctor right away if you have any symptoms of worsening heart failure. These can include:  · Sudden weight gain  · Increased swelling of your legs or ankles  · Trouble breathing when youre resting or at night  · Increase in the number of pillows you have to sleep on  · Chest pain, pressure, discomfort, or pain in the jaw, neck, or back   Date Last Reviewed: 3/21/2016  © 7826-6688 [employer]. [address] [place] [address], [place] [address]. All rights reserved. This information is not intended as a substitute for professional medical care. Always follow your healthcare professional's instructions.        Heart Failure: Tracking Your Weight  You have a condition called heart failure. When you have heart failure, a sudden weight gain or a steady rise in weight is a warning sign that your body is retaining too much water and salt. This could mean your heart failure is getting worse. If left untreated, it can cause problems for your lungs and result in shortness of breath. Weighing yourself each day is the best way to know if youre retaining water. If your weight goes up quickly, call your doctor. You will be given instructions on how to get rid of the excess water. You will likely need medicines and to avoid salt. This will help your heart work better.  Call your doctor if you gain more than 2 pounds in 1 day, more than 5 pounds in 1 week, or whatever weight gain you were told to report by your doctor. This is often a sign of worsening heart failure and needs to be evaluated and treated. Your doctor will tell you what to do next.   Tips for weighing yourself    · Weigh yourself at the same time each morning, wearing the  same clothes. Weigh yourself after urinating and before eating.  · Use the same scale each day. Make sure the numbers are easy to read. Put the scale on a flat, hard surface -- not on a rug or carpet.  · Do not stop weighing yourself. If you forget one day, weigh again the next morning.  How to use your weight chart  · Keep your weight chart near the scale. Write your weight on the chart as soon as you get off the scale.  · Fill in the month and the start date on the chart. Then write down your weight each day. Your chart will look like this:    · If you miss a day, leave the space blank. Weigh yourself the next day and write your weight in the next space.  · Take your weight chart with you when you go to see your doctor.  Date Last Reviewed: 3/20/2016  © 3037-6708 [employer]. [address] [place] [address]. All rights reserved. This information is not intended as a substitute for professional medical care. Always follow your healthcare professional's instructions.        Heart Failure: Warning Signs of a Flare-Up  You have a condition called heart failure. Once you have heart failure, flare-ups can happen. Below are signs that can mean your heart failure is getting worse. If you notice any of these warning signs, call your healthcare provider.  Swelling    · Your feet, ankles, or lower legs get puffier.  · You notice skin changes on your lower legs.  · Your shoes feel too tight.  · Your clothes are tighter in the waist.  · You have trouble getting rings on or off your fingers.  Shortness of breath  · You have to breathe harder even when youre doing your normal activities or when youre resting.  · You are short of breath walking up stairs or even short distances.  · You wake up at night short of breath or coughing.  · You need to use more pillows or sit up to sleep.  · You wake up tired or restless.  Other warning signs  · You feel weaker, dizzy, or more tired.  · You have chest pain or  changes in your heartbeat.  · You have a cough that wont go away.  · You cant remember things or dont feel like eating.  Tracking your weight  Gaining weight is often the first warning sign that heart failure is getting worse. Gaining even a few pounds can be a sign that your body is retaining excess water and salt. Weighing yourself each day in the morning after you urinate and before you eat, is the best way to know if you're retaining water. Get a scale that is easy to read and make sure you wear the same clothes and use the same scale every time you weigh. Your healthcare provider will show you how to track your weight. Call your doctor if you gain more than 2 pounds in 1 day, 5 pounds in 1 week, or whatever weight gain you were told to report by your doctor. This is often a sign of worsening heart failure and needs to be evaluated and treated before it compromises your breathing. Your doctor will tell you what to do next.    Date Last Reviewed: 3/15/2016  © 1666-8379 [employer]. [address] [place] [address]. All rights reserved. This information is not intended as a substitute for professional medical care. Always follow your healthcare professional's instructions.        Discharge Instructions for Ventricular Assist Device (VAD)  You had a procedure to insert a ventricular assist device (VAD). This device replaces the pumping action of your heart. Usually, a VAD is inserted as a bridge to a later heart transplant. But healthcare providers have also found that a VAD gives the heart a chance to rest and recover. In some cases, the heart is able to resume some normal activity, which may eliminate the need for a heart transplant. For some people who are not candidates for a heart transplant, the VAD is considered permanent. This is referred to as destination therapy.  There are different styles and brands of VADs. Caring for your VAD will depend on the type you get. Some VADs work  with a pump that uses air (pneumatic). Most newer devices are not pneumatic operated. They have a power source and a small pump.  Here's what you need to know about home care.  Activity  · Don't lift, pull, or push anything heavier than 10 pounds during the first 6 weeks after your surgery.  · Shower with care. Your VAD has an air vent and a filter. Keep fluid away from these AT ALL TIMES.  · Don't swim or play any water sports. No boating, hot tubs, or baths.  · Don't drive.  Special precautions  · Your VAD is a very special device. It needs a special team to help you with care. Always know who this team is and how to reach the coordinator.   · Keep the following near you at all times:  ¨ A hand pump (to use if the power supply of your device fails)  ¨ [facility]'s paging number for the VAD coordinator heart transplant coordinator  ¨ Backup power pack with charged batteries  · Test your system every day.  · Make sure your family or someone in your home knows how to change the power supply and care for your device.  · Notify the power company that you have a VAD. The power company will place you on a priority list to have your power restored first in case of a power outage. Your VAD coordinator can assist you with this. It should be done prior to your discharge from the hospital.  · Carry an ID card that identifies your device.  · Take your temperature every day. Call your healthcare provider or your VAD coordinator if it is above 100.4°F (38°C).  · Make sure you understand how to monitor your blood pressure with the VAD. Monitoring your blood pressure will be different. A normal blood pressure cuff will not measure it properly.   Other home care  · Change the device filter according to the directions you were given before you left the hospital. If you did not receive directions, ask for them.  · Take your medicines exactly as directed. Don't skip doses.  · Monitor your blood carefully to prevent it from being too  thick or too thin, which can cause bleeding. You will have to be on blood thinners to prevent blood clots from forming in the device. Blood clots can cause a stroke or other arterial blockage.   · Eat a healthy diet. Ask your healthcare provider for menus and other diet information. Generally, you should avoid drinking more than 2 liters of water in a day or eating more than 2,000 mg of salt.     When to call your healthcare provider  Call your healthcare provider right away if you have any of the following:  · Fever of 100.4°F (38°C) or higher, or as directed by your healthcare provider  · Signs of infection at your device's exit site (redness, swelling, drainage, or warmth)  · Device alarm sounds  · Black soot in the air filter of the device  · Fatigue that doesn't get better  · Dizziness that doesn't go away  · Shortness of breath  · Chest pain  · Swollen hands, feet, or ankles   Date Last Reviewed: 10/1/2016  © 3609-8337 [employer]. [address] [place] [address]. All rights reserved. This information is not intended as a substitute for professional medical care. Always follow your healthcare professional's instructions.        Low-Salt Diet  This diet removes foods that are high in salt. It also limits the amount of salt you use when cooking. It is most often used for people with high blood pressure, edema (fluid retention), and kidney, liver, or heart disease.  Table salt contains the mineral sodium. Your body needs sodium to work normally. But too much sodium can make your health problems worse. Your healthcare provider is recommending a low-salt (also called low-sodium) diet for you. Your total daily allowance of salt is 1,500 to 2,300 milligrams (mg). It is less than 1 teaspoon of table salt. This means you can have only about 500 to 700 mg of sodium at each meal. People with certain health problems should limit salt intake to the lower end of the recommended range.    When you  cook, dont add much salt. If you can cook without using salt, even better. Dont add salt to your food at the table.  When shopping, read food labels. Salt is often called sodium on the label. Choose foods that are salt-free, low salt, or very low salt. Note that foods with reduced salt may not lower your salt intake enough.    Beans, potatoes, and pasta  Ok: Dry beans, split peas, lentils, potatoes, rice, macaroni, pasta, spaghetti without added salt  Avoid: Potato chips, tortilla chips, and similar products  Breads and cereals  Ok: Low-sodium breads, rolls, cereals, and cakes; low-salt crackers, matzo crackers  Avoid: Salted crackers, pretzels, popcorn, [place] toast, pancakes, muffins  Dairy  Ok: Milk, chocolate milk, hot chocolate mix, low-salt cheeses, and yogurt  Avoid: Processed cheese and cheese spreads; Roquefort, Camembert, and cottage cheese; buttermilk, instant breakfast drink  Desserts  Ok: Ice cream, frozen yogurt, juice bars, gelatin, cookies and pies, sugar, honey, jelly, hard candy  Avoid: Most pies, cakes and cookies prepared or processed with salt; instant pudding  Drinks  Ok: Tea, coffee, fizzy (carbonated) drinks, juices  Avoid: Flavored coffees, electrolyte replacement drinks, sports drinks  Meats  Ok: All fresh meat, fish, poultry, low-salt tuna, eggs, egg substitute  Avoid: Smoked, pickled, brine-cured, or salted meats and fish. This includes zamarripa, chipped beef, corned beef, hot dogs, deli meats, ham, kosher meats, salt pork, sausage, canned tuna, salted codfish, smoked salmon, herring, sardines, or anchovies.  Seasonings and spices  Ok: Most seasonings are okay. Good substitutes for salt include: fresh herb blends, hot sauce, lemon, garlic, strickland, vinegar, dry mustard, parsley, cilantro, horseradish, tomato paste, regular margarine, mayonnaise, unsalted butter, cream cheese, vegetable oil, cream, low-salt salad dressing and gravy.  Avoid: Regular ketchup, relishes, pickles, soy sauce,  maggieki sauce, [facility], [place] sauce, BBQ sauce, tartar sauce, meat tenderizer, chili sauce, regular gravy, regular salad dressing, salted butter  Soups  Ok: Low-salt soups and broths made with allowed foods  Avoid: Bouillon cubes, soups with smoked or salted meats, regular soup and broth  Vegetables  Ok: Most vegetables are okay; also low-salt tomato and vegetable juices  Avoid: Sauerkraut and other brine-soaked vegetables; pickles and other pickled vegetables; tomato juice, olives  Date Last Reviewed: 8/1/2016 © 2000-2016 [employer]. [address] [place] [address], [place] [address]. All rights reserved. This information is not intended as a substitute for professional medical care. Always follow your healthcare professional's instructions.

## 2017-06-21 NOTE — PROGRESS NOTES
"6/21/17  Call to screen for Outpatient Case Management needs as planned yest with pt. Noted pt has URO appt at this time.   Will call back this PM.    CM completed initial screen/nursing assessment/MED-REC not discrepancies found/PHQ9= 15.   ( > or = 15 Warrants treatment for depression, using antidepressant, psychotherapy and/ or a combination of treatment).  Mr. Audrey Oneil Jr is  living with his mother in her home and brother-in-law--otherwise he says he would be homeless. He has a complicated cardiac history since 2005. 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 He says amiodarone has helped his heart condition. H/o Life Vest. He is scheduled for LVAD 6/28/17- put off once before out of fear by pt of its impact on his quality of life. Having the LVAD is needed in the process of determining his being a heart tx candidate. He mentions lack of quality of life, his frustration with the health system/[employer] and the co payments he can't afford and the financial debt with Medical bills. He went to his Eye Exam appt this am. Because of a $50 co pay, he did not attend appt and cancelled his [employer] coverage---opting to enroll in a [place] Plan ([employer]/Medicaid) leaving him with straight Medicare A&B.  He wants to see if he is eligible for Medicaid. No med insurance coverage at the moment. His glasses are broken, his hearing limited.     CM instructed Jr Harriet in the practice of weighing every AM to observe and report the early s/s CHF-- 2-3 lbs/24 hrs and 5 lbs/one week. He says that he usually fluctuates up/down 3 lbs or so. He is not weighing daily nor does he commit to stating that he will start. CM attempted to stress its importance in catching early s/s fluid retention and its strain on heart. Encouraged pt to avoid straining with bowels-- not using the listed Colace but something else that he can not locate.   He is frustrated from his wt of 240 lbs/5'7"--can't seem to lose wt. He must pace " his activities to avoid SOB & fatigue.   He is [place]  who doesn't want anything to do with the [facility] ie such as med coverage.    Referred to [facility] for financial assistance, community resources, Medicaid application.  CM provided contact number and mailed it, along with educational material, wt log, [facility] and [facility] resources for vision/hearing---glasses/hearing aides on sliding scale.   Referred to PAP since pt is without drug plan.   Messaged in basket to Dr Sin regarding PHQ9= 15 in presence of no current treatment.         --------------------------------------------------------------------------------------------------------------------------------------------------------------------------    To PAP:  Request for med assistance for Mr. Oneil who has cancelled his [employer] policy and therefore is without rx coverage going forward. He has applied for a [place] program--name? And hoping for Medicaid eventually.   Any assistance for pt in the interim?    Thank you,  MALINDA Jeffrey, RN, [facility]  [facility]  TEL:  [phone]      Dr. iSn:  New PCP  It appears that Mr. Oneil will be establishing care with you 7/7/17.   He was enrolled in Outpatient Case Management services---and his PHQ9 Depression Screen was high= 15   (> or = 15 Warrants treatment for depression, using antidepressant, psychotherapy and/ or a combination of treatment).  He is not currently being treated for depression. Given his serious cardiac condition it is understandable. He denies SI.   Please advise.   Thank you,  MALINDA Jeffrey, RN, [facility]  [facility]  TEL:  [phone]

## 2017-06-27 DIAGNOSIS — I25.10 ATHEROSCLEROTIC HEART DISEASE OF NATIVE CORONARY ARTERY WITHOUT ANGINA PECTORIS: ICD-10-CM

## 2017-06-27 DIAGNOSIS — I50.42 CHRONIC COMBINED SYSTOLIC (CONGESTIVE) AND DIASTOLIC (CONGESTIVE) HEART FAILURE: Primary | ICD-10-CM

## 2017-06-29 ENCOUNTER — CLINICAL SUPPORT (OUTPATIENT)
Dept: ELECTROPHYSIOLOGY | Facility: CLINIC | Age: 67
End: 2017-06-29
Payer: MEDICARE

## 2017-06-29 DIAGNOSIS — I42.9 CARDIOMYOPATHY: ICD-10-CM

## 2017-06-29 DIAGNOSIS — Z95.810 AUTOMATIC IMPLANTABLE CARDIAC DEFIBRILLATOR IN SITU: ICD-10-CM

## 2017-06-29 PROCEDURE — 93295 DEV INTERROG REMOTE 1/2/MLT: CPT | Mod: NTX,S$GLB,, | Performed by: INTERNAL MEDICINE

## 2017-06-29 PROCEDURE — 93296 REM INTERROG EVL PM/IDS: CPT | Mod: NTX,S$GLB,, | Performed by: INTERNAL MEDICINE

## 2017-06-30 ENCOUNTER — OUTPATIENT CASE MANAGEMENT (OUTPATIENT)
Dept: ADMINISTRATIVE | Facility: OTHER | Age: 67
End: 2017-06-30

## 2017-06-30 NOTE — PROGRESS NOTES
6/30/17  CM f/u to update plan of care. Verbal message left requesting return call. Call back received immediately from pt's mother. She says Mr. Oneil is staying a few days with his sister in [place], the phone # is not known to her. She says she will have her son call this CM when he can.   CM explained the effort to schedule his next appt earlier than 7/19/17 if possible. ( The purpose not revealed, to eval pt's behavioral health status 2* PHQ9 score 6/22/17).    Plan for next wk's encounter:  Repeat PHQ9  Schedule earlier appt at the [facility] if poss to   Check on pt's wt   Check on PAP outcome.   Check on his insurance status

## 2017-07-02 RX ORDER — FUROSEMIDE 40 MG/1
TABLET ORAL
Qty: 30 TABLET | Refills: 0 | Status: SHIPPED | OUTPATIENT
Start: 2017-07-02 | End: 2017-08-08 | Stop reason: SDUPTHER

## 2017-07-03 ENCOUNTER — OUTPATIENT CASE MANAGEMENT (OUTPATIENT)
Dept: ADMINISTRATIVE | Facility: OTHER | Age: 67
End: 2017-07-03

## 2017-07-03 NOTE — LETTER
July 3, 2017    Audrey Garciaedwin Tobin.  [address], [place] [address]             [facility] [address] [address] Dear Mr. Oneil:    I have enclosed Medicaid, transportation and food resources as discussed via telephone today. I am also including a Senior Resource Guide.  I hope this will be helpful.    Your assigned Outpatient Case Management Social worker is Thor Casas LCSW and she can be reached at [phone].  She will follow up with you in 2 weeks as requested due to your scheduled procedure.    Sincerely,      Katelin Dash LCSW    Outpatient Complex Care Management

## 2017-07-03 NOTE — PROGRESS NOTES
7/3/17  CM f/u to update plan of care attempted. Verbal message left with pt's mother who answer phone--requesting call back.

## 2017-07-03 NOTE — PROGRESS NOTES
[facility] LCSW/Thor Casas received a referral from OPCM RN/Aliyah Sutton for financial resources.  This LCSW spoke with pt to complete OPCM SW Assessment and PHQ2.  Pt is a 68yo  male who currently resides with his mother and brother-in-law to prevent becoming homeless.  Pt has a long hx of heart failure and states he will be evaluated for LVAD on 7/10/17.  Pt has been on disability for appx 10 yrs.  Pt is independent with ADL's and brother-in-law assists with transportation as able since brother-in-law is the  for multiple family members.  LCSW discussed transportation options:  [employer], [employer] and [employer] and will mail information as requested.  Pt reports he was denied for [employer] and is unsure if he has applied for [employer] benefits based on medical bills.  LCSW informed pt of Medicaid Application Center [facility] and pt states he can speak with them since he is there frequently.  LCSW will mail appt line information.  Pt is a [place] and refuses to use the VA and does not wish to be asked otherwise.  Pt scored a 15 on PHQ9 with OPCM RN on 6/21 but scored a 2 today and denies any hx of Depression or SI.  LCSW will also mail:  [employer]pline and Senior Resource Guide.  LCSW explained that Thor Casas, JUNE will f/u with pt and pt prefers to be called in 2 weeks since his heart cath and possible LVAD procedure is 7/10.  LCSW also mailed contact information & Senior Resource Guide.

## 2017-07-07 DIAGNOSIS — I11.0 HYPERTENSIVE HEART DISEASE WITH CONGESTIVE HEART FAILURE: Primary | ICD-10-CM

## 2017-07-08 ENCOUNTER — OUTPATIENT CASE MANAGEMENT (OUTPATIENT)
Dept: ADMINISTRATIVE | Facility: OTHER | Age: 67
End: 2017-07-08

## 2017-07-08 NOTE — PROGRESS NOTES
Please note an Outpatient Complex Care Management welcome packet and consent form was created and mailed to the patient on 7/7/17.    Please contact [facility] at [phone] with any questions.    Thank you,      Joyce Hurst, SSC

## 2017-07-08 NOTE — LETTER
July 8, 2017    Audrey Oneli Jr.  [address] [address]             Outpatient Case Management  [address] [address] Dear Audrey Oneil Jr.:    We understand that receiving many services from different doctors and healthcare providers is overwhelming. There are appointments to make, transportation to arrange, dietary instructions to understand, and new medications to obtain.    This is where [facility] can help.     You are eligible to receive Outpatient Case Management services when you have healthcare needs that require the coordination of many providers, treatments, and services. You also qualify if you need assistance with a new treatment plan.     There is no charge for this support. You may have been referred to this program from your doctor(s), hospital staff member(s), or insurance company but you always have a choice to participate or not participate. To participate, you must give us your permission to be enrolled.     When you are enrolled in the [facility] program, the  who is assigned to you is    Aliyah Sutton RN  [phone]    Depending on your needs and wishes, your  may speak with you by phone, visit you at your place of living (for example your home, skilled nursing facility, or rehabilitation facility), or meet you at your doctors office.     Your  will tell you why you have been selected to participate in the program and will complete an assessment of your needs. Then a personalized plan of care will be developed with you and or your caregiver.             Here are examples of the services your [facility]  provides.     Coordinate communication among multiple providers.   Arrange for transportation, doctors visits, durable medical equipment, home care services, and special clinics.    Provide coaching on how to manage your health condition.     Answer questions about your health condition.   Help you understand your doctors treatment plan.    Provide additional instruction about your health condition, treatments, and medications.    Help you obtain information about your insurance coverage.    Advocate for your individual needs.    Request a Licensed Clinical  (LCSW) to visit you if you need their services. LCSWs help with long term planning (discussing placement options, advanced planning directives), financial planning, and assistance (for example rent, utilities, medication funding).     Your  will coordinate their activities with other outpatient services you are receiving. All services provided by [facility]  are coordinated with and communicated to your primary care physician.    Our goal is to help you manage your health condition(s) safely within your living environment, whether that is your home or a medical facility. We want to help you function at the healthiest and highest level possible.     Sincerely,      Prudencio Olsen MD  Medical Director    Enclosures:    Frequently Asked Questions  Patient Rights and Responsibilities   Reporting a Grievance or Complaint  Consent/Release of Information  Stamped Addressed Envelope                  Frequently Asked Questions about [facility]    What is [facility]?  Outpatient Case management is not Home healthcare services. [facility]  do not provide hands-on care. [facility]  will work with your doctor to arrange for home health services, if needed. Home health services have a limited duration and there are some restrictions as to who can get these services. There is no prescribed limit to the amount of time you receive [facility] services. [facility]  are not agents of your insurance company. However, [facility]  can help you obtain information from your insurance company.     Who are the [facility] ?  [facility]  are Registered Nurses and Social Workers. It is important to remember that you and your  are a team that works together with your primary care physician to create your individualized plan of care. The ultimate goal of your care plan is to help you implement your doctors treatment plan and to help you function at the highest level of health possible.     What are my rights as a patient?  It is important for you to know and understand your rights and responsibilities while receiving services from the [facility] program. We have enclosed a complete description of your rights and responsibilities. You can help to make your care more effective when you understand your right and responsibilities.     What is needed to be enrolled in the program?  You are only enrolled in the [facility] Program when you give us your consent to participate. You will find enclosed a consent form. You are receiving this letter because you or your caregivers have given us a verbal consent to enroll you in [facility] Program. We ask that you sign and return the enclosed written consent in the stamped self-addressed envelope.                           Patient Rights and Responsibilities    We consider you a partner in your care. When you are well informed, participate in treatment decisions and communicate openly with your doctor and other healthcare professionals, you help make your care more effective.     While you are in the Outpatient Case Management Program, your rights include the following:     You have a right to be provided services in a non-discriminatory manner in accordance with the provisions of Title VI of the Civil Rights Act of 1964, Section 504 of the Rehabilitation Act of  1973, the Age Discrimination Act of 1975, the Americans with Disabilities Act as well as any other applicable Federal and State laws and regulations.     You have the right to a reasonable, timely response to your request or need for care, as well as the right to considerate and respectful care including an environment that preserves dignity and contributes to a positive self-image. You are responsible for being considerate and respectful of our staff.     You have a right to information regarding patient rights, advocacy services and complaint mechanisms, and the right to prompt resolution of any complaint. You or a designee has the right to participate in the resolution of ethical issues surrounding your care. You have a right to file a complaint if you feel that your rights have been infringed, without fear or penalty from [facility] or the federal government. You may file a complaint with the Director of Outpatient Case Management by calling [phone]. At any time, you may lodge a grievance with the [facility] and \[facility]\"" by calling [phone], or the Joint Commission on Accreditation of Healthcare Organizations at [phone].     You, or someone acting on your behalf, have the right to understandable information on your health status, treatment and progress in order to make decisions. You have the right to know the nature, risks and alternatives to treatment. You have the right to be informed, when appropriate, regarding the outcome of the care that has been provided. You have the right to refuse treatment to the extent permitted by law, and the right to be informed of the alternatives and consequences of refusing treatment.     You, in collaboration with your physician, have the right to make decisions regarding care and the right to participate in the development and implementation of the plan of care and effective pain management. You have the right to know the name and  professional status of those responsible for the delivery of your care and treatment.       You have a right, within legal guidelines, to have a guardian, next-of-kin or legal designee exercises your patient rights when you are unable to do so. You have the right for your wishes regarding end-of-life decisions to be addressed by the healthcare team through advance directives. You have the right to personal privacy and confidentiality and to expect confidentiality of all records and communications pertaining to your care.      You have the right to receive communications about your health information confidentially. You have the right to request restrictions on the uses and disclosures of your health information. You have the right to inspect, copy, request amendments and receive an accounting of to whom we have disclosed your health information.     You have the right to be provided with interpretation services if you do not speak English; to alternative communication techniques if you are hearing or vision impaired; and to have any other resources needed on your behalf to ensure effective communication. These services are provided free of charge.     You have a right to personal safety (free from mental, physical, sexual and verbal abuse, neglect and exploitation). You have the right to access protective and advocacy services.     Advance Directives  A Patient Advocate is available to meet with patients to answer questions regarding advance directives.    Living Will  A document that outlines what medical treatment the patient does or does not want in the event the patient becomes unable to make those decisions at the appropriate time.    Durable Medical Power of   A document by which the patient designates an individual to be responsible for making medical decisions in the event the patient becomes unable to do so.    HIPAA Notice of Privacy Practices  Your medical information is governed by federal  privacy laws. HIPAA protects private medical information and how that information is disclosed. If you have a question regarding the HIPAA Notice of Privacy Practices, or if you believe your privacy rights have been violated, you may call our designated hotline at [phone].            Quality Improvement  Because we consistently strive to improve the care and service provided to our patients, we welcome your feedback. Your comments are an important part of our quality improvement process, as we like to know what we are doing right and which areas are in need of improvement. Our policy is to listen, be responsive and provide you with an appropriate and timely follow-up to your questions or concerns. Our goal is active patient and family involvement in all aspects of the care process.          Reporting a Grievance or Complaint    During your time with the [facility] team you may have a grievance or complaint with our services. Your Patients Bill of Rights gives patients, families, and caregivers the right to express concerns and grievances and the right to expect a reasonable and timely response.     Your presentation of your concerns is not viewed negatively. It is an opportunity for us to improve the quality of our care and services we provide to you.     You may report your concerns directly to your , or you can phone in a complaint to:     Director of Outpatient Case Management  [phone]    You may also send a complaint letter to:    Director of Outpatient Case Management Services  [address] [place] [address]    Tell us the details of your complaint and provide us with a contact phone number so we can contact you to obtain additional information. We will return a call to you within two business days of our receipt of your complaint, and to request additional information as needed. If you choose to mail a letter, your complaint may take a few days  longer to reach us.     All grievances will be addressed as quickly as possible. A grievance or complaint that involves situations or practices which place patients in immediate danger will be addressed as an urgent matter. We will work to resolve all other complaints within seven days of receipt. By that time, you will receive a phone call with either the resolution of your complaint, or a plan for corrective action. A formal written response will be sent to you within 30 days of receipt of your grievance.     If a resolution cannot be completed within 30 days, a letter will be sent to you or your family member with an estimated time for the final response.    Additionally, all patients have the right to file complaints with external agencies, without exception. Complaints/grievances can be addressed to the following agencies:            Patient Safety or Quality of Care Concerns  Office of Quality Monitoring   [facility] [place] [address]  [phone] Toll Free    HIPPA Privacy/Security Concerns  Office for Civil Rights Region IV  [place] Department of Health & Human Services  [address] [place] [address], [address]  [place] [address] [phone] Phone  [phone] TDD  [phone] Toll Free    Medicare/Medicaid Billing Concerns  [place] for Medicare & Medicaid Services  Region [address]  [address] [place] [address], [address]  [place] [address] [phone] Phone  [phone] Toll Free    General Concerns  [facility] ([facility])  [phone] Toll Free Complaint Hotline                                        Consent Form/Release of Information    By signing--     (1) I agree I have read the Outpatient Case Management information provided to me;     (2) I agree to voluntarily participate in the Outpatient Case Management program;     (3) I understand I must consent to participation in the Outpatient Case Management program during my first interview  with my ;    (4) I consent to the discussion and release of my personal health information to my healthcare team (including my personal physician, my medical home care team, any specialty physician(s), and my [facility] team);     (5) I agree my consent is valid for the length of time I am receiving Outpatient Case Management;    (6) I agree to referrals to community resources which my Case Management team recommends for me. I agree to the release of my personal information and personal health information as necessary to referral sources.    ___________________________________________________________________  Patients Printed Name     ___________________________________________________________________  Patients Signature       Date    If patient is in being cared for, please complete this section:     ___________________________________________________________________  Printed Name of Person Caring For Patient   Relationship To Patient    ___________________________________________________________________   Signature of Person Caring For Patient     Date    PLEASE SIGN AND RETURN IN THE ENCLOSED PRE-ADDRESSED ENVELOPE.

## 2017-07-09 ENCOUNTER — NURSE TRIAGE (OUTPATIENT)
Dept: ADMINISTRATIVE | Facility: CLINIC | Age: 67
End: 2017-07-09

## 2017-07-10 ENCOUNTER — HOSPITAL ENCOUNTER (OUTPATIENT)
Facility: HOSPITAL | Age: 67
Discharge: HOME | End: 2017-07-10
Attending: INTERNAL MEDICINE | Admitting: INTERNAL MEDICINE
Payer: MEDICARE

## 2017-07-10 ENCOUNTER — SURGERY (OUTPATIENT)
Age: 67
End: 2017-07-10

## 2017-07-10 PROCEDURE — 93451 RIGHT HEART CATH: CPT | Mod: 26,NTX,, | Performed by: INTERNAL MEDICINE

## 2017-07-10 PROCEDURE — 25000003 PHARM REV CODE 250: Mod: TXP

## 2017-07-10 PROCEDURE — C1894 INTRO/SHEATH, NON-LASER: HCPCS | Mod: TXP

## 2017-07-10 NOTE — INTERVAL H&P NOTE
Patient seen and examined. No interval change since last H&P. Here for a RHC to assess his hemodynamics.   Access via the right IJ  7 Fr venous sheath  Risks and benefits of the procedure were explained to the patient. All questions were answered.  Consents were signed and in the chart.    Edison Marshall MD

## 2017-07-10 NOTE — DISCHARGE INSTRUCTIONS

## 2017-07-10 NOTE — H&P (VIEW-ONLY)
Subjective:   Transplant status: active    HPI:  Mr. Oneil is a very pleasant 64 yo male with a hx of CAD s/p STEMI(s/p PCI LAD 2007), CHF/ICM and remote MI, quit smoking Dec 2015,  PE (2010, s/p 1-yr coumadin), HTN, DLP and s/p ICD [employer] [facility] placement due to VT who comes for a regular follow-up.  I had seen him 3 months ago and was worried that he was declining and his HF symptoms were worsening and therefore I had ordered a repeat  CPX that showed low Peak VO2 of 7.5ml/kg/min but AT was not attained and RER was only 0.67 (poor effort). Most recent 2D echo showed severely depressed LV function with an EF=15%,  LV with a LVEDD of 5.9  cm, normal RV size and function. Clinically reports NYHA class III symptoms. Does not smoke. RHC was ordered but cancelled by the patient as he got scared,  Labs reviewed. Creatinine was 1.5 last month.     Past Medical History:   Diagnosis Date    Chronic anticoagulation 5/5/2016    Chronic combined systolic and diastolic heart failure 11/26/2012    EF 10-20% on ECHO 2013    Clotting disorder     Coronary artery disease involving native coronary artery of native heart without angina pectoris 11/26/2012    Cath 10- Stents patent non-obstructive disease Cath 11-12015 non-obstructive disease     Diverticulosis of colon     DVT (deep venous thrombosis), unspecified laterality 11/12/2015    Essential hypertension 11/15/2015    Hyperlipidemia     Hypertensive heart disease with heart failure 5/5/2016    MI (myocardial infarction) 2009    Nicotine abuse     Obesity 11/26/2012    Pulmonary embolism 2011    Stented coronary artery 11/26/2012    LAD stent placed 10/17/2007      Past Surgical History:   Procedure Laterality Date    APPENDECTOMY      BACK SURGERY      CARDIAC SURGERY      stent    r knee scope      SPINE SURGERY      TONSILLECTOMY         Review of Systems   Constitution: Positive for weight gain. Negative for chills, decreased appetite, diaphoresis,  "fever, weakness, malaise/fatigue, night sweats and weight loss.   Eyes: Negative.    Cardiovascular: Positive for dyspnea on exertion and orthopnea. Negative for chest pain, claudication, cyanosis, irregular heartbeat, leg swelling, near-syncope, palpitations, paroxysmal nocturnal dyspnea and syncope.   Respiratory: Negative for cough, hemoptysis and shortness of breath.    Endocrine: Negative.    Hematologic/Lymphatic: Negative.    Skin: Negative for color change, dry skin and nail changes.   Musculoskeletal: Negative.    Gastrointestinal: Negative.    Genitourinary: Negative.        Objective:   Blood pressure (!) 102/57, pulse 65, height 5' 7" (1.702 m), weight 109.8 kg (242 lb 1 oz).body mass index is 37.91 kg/m².  Physical Exam   Constitutional: He appears well-developed.   BP (!) 102/57   Pulse 65   Ht 5' 7" (1.702 m)   Wt 109.8 kg (242 lb 1 oz)   BMI 37.91 kg/m²      HENT:   Head: Normocephalic.   Neck: JVD present. Carotid bruit is not present.   Cardiovascular: Regular rhythm and normal heart sounds.  PMI is displaced.    No murmur heard.  Pulmonary/Chest: Effort normal and breath sounds normal. No respiratory distress. He has no wheezes. He has no rales.   Abdominal: Soft. Bowel sounds are normal. He exhibits distension. There is no tenderness. There is no rebound.   Musculoskeletal: He exhibits no edema.   Warm extremities   Neurological: He is alert.   Skin: Skin is warm.   Vitals reviewed.      Labs:    Chemistry        Component Value Date/Time     05/05/2017 1220    K 5.1 05/05/2017 1220     05/05/2017 1220    CO2 27 05/05/2017 1220    BUN 27 (H) 05/05/2017 1220    CREATININE 1.5 (H) 05/05/2017 1220     05/05/2017 1220        Component Value Date/Time    CALCIUM 9.2 05/05/2017 1220    ALKPHOS 82 05/05/2017 1220    AST 22 05/05/2017 1220    ALT 31 05/05/2017 1220    BILITOT 0.7 05/05/2017 1220          Magnesium   Date Value Ref Range Status   05/05/2017 2.4 1.6 - 2.6 mg/dL Final "     Lab Results   Component Value Date    WBC 9.04 05/05/2017    HGB 13.9 (L) 05/05/2017    HCT 41.7 05/05/2017     05/05/2017     Lab Results   Component Value Date    INR 0.9 12/08/2016    INR 0.9 07/03/2016    INR 1.1 12/06/2015     BNP   Date Value Ref Range Status   04/14/2017 81 0 - 99 pg/mL Final     Comment:     Values of less than 100 pg/ml are consistent with non-CHF populations.   03/30/2017 63 0 - 99 pg/mL Final     Comment:     Values of less than 100 pg/ml are consistent with non-CHF populations.   02/02/2017 158 (H) 0 - 99 pg/mL Final     Comment:     Values of less than 100 pg/ml are consistent with non-CHF populations.     No results found for: LDH  No results found for this or any previous visit.  No results found for this or any previous visit.    Assessment:      1. Chronic combined systolic and diastolic heart failure    2. Cardiomyopathy, ischemic    3. Coronary artery disease involving native coronary artery of native heart without angina pectoris    4. AICD (automatic cardioverter/defibrillator) present    5. CKD (chronic kidney disease), stage III    6. Essential hypertension        Plan:   67 y/o male with ICMP, HFrEF stage D with NYHA class III symptoms  He has not had his RHC done yet (aptient got scared). Will plan for a RHC with me in the third week of June.   Recommend 2 gram sodium restriction and 1500cc fluid restriction.  Encourage physical activity with graded exercise program.  Requested patient to weigh themselves daily, and to notify us if their weight increases by more than 3 lbs in 1 day or 5 lbs in 1 week.   RTC in 3 months or earlier depending on his RHC results.      Edison Marshall MD

## 2017-07-10 NOTE — BRIEF OP NOTE
[facility]  Brief Operative Note  Cardiology    SUMMARY     Surgery Date: 7/10/2017     Surgeon(s) and Role:     * Edison Marshall MD - Primary    Assisting Surgeon: None    Pre-op Diagnosis:  Chronic systolic congestive heart failure [I50.22]    Post-op Diagnosis: CHF    Procedure Performed: RHC    Description of the findings of the procedure: RHC performed via the right IJ. Patient tolerated the procedure well. Normal left and right sided filling pressures. Normal pulmonary pressures. Low normal cardiac index and output off .     Estimated Blood Loss: < 10 cc    Plan:   - Follow-up with me in clinic    Edison Marshall MD

## 2017-07-10 NOTE — DISCHARGE SUMMARY
[facility]  Discharge Note  Short Stay    Admit Date: 7/10/2017    Discharge Date and Time: 7/10/2017    Attending Physician: Edison Marshall MD     Discharge Provider: Edison Marshall MD    Diagnoses: CHF      Discharged Condition: good    Hospital Course: Patient was admitted for an outpatient procedure and tolerated the procedure well with no complications.    Final Diagnoses: Same as principal problem.    Disposition: Home or Self Care    Follow up/Patient Instructions:    Medications:  Reconciled Home Medications:   Current Discharge Medication List      CONTINUE these medications which have NOT CHANGED    Details   amiodarone (PACERONE) 200 MG Tab Take 1.5 tablets (300 mg total) by mouth once daily.  Qty: 135 tablet, Refills: 3    Associated Diagnoses: VT (ventricular tachycardia)      aspirin (ECOTRIN) 325 MG EC tablet Take 325 mg by mouth once daily.      atorvastatin (LIPITOR) 80 MG tablet TAKE 1 TABLET(80 MG) BY MOUTH EVERY DAY  Qty: 90 tablet, Refills: 0      clopidogrel (PLAVIX) 75 mg tablet Take 75 mg by mouth once daily.  Refills: 10      dexlansoprazole (DEXILANT) 60 mg capsule Take 60 mg by mouth once daily.      docusate sodium (COLACE) 50 MG capsule Take 1 capsule (50 mg total) by mouth once daily.  Refills: 0      furosemide (LASIX) 40 MG tablet TAKE 1 TABLET BY MOUTH EVERY DAY  Qty: 30 tablet, Refills: 0      hydrocodone-acetaminophen 7.5-325mg (NORCO) 7.5-325 mg per tablet 1 tablet every 4 (four) hours as needed.       lisinopril (PRINIVIL,ZESTRIL) 5 MG tablet Take 1 tablet (5 mg total) by mouth once daily.  Qty: 90 tablet, Refills: 3    Associated Diagnoses: Chronic combined systolic and diastolic heart failure      metoprolol succinate (TOPROL-XL) 50 MG 24 hr tablet TAKE 1 TABLET BY MOUTH EVERY DAY  Qty: 90 tablet, Refills: 3      nitroGLYCERIN (NITROSTAT) 0.4 MG SL tablet PLACE 1 TABLET UNDER THE TOUNGE EVERY 5 MINUTES AS NEEDED FOR CHEST PAIN  Qty: 350 tablet, Refills: 0    Comments:  **Patient requests 90 days supply**  Associated Diagnoses: Coronary artery disease, angina presence unspecified, unspecified vessel or lesion type, unspecified whether native or transplanted heart      spironolactone (ALDACTONE) 25 MG tablet TAKE 1 TABLET(25 MG) BY MOUTH EVERY DAY  Qty: 90 tablet, Refills: 11    Comments: **Patient requests 90 days supply**  Associated Diagnoses: Chronic combined systolic and diastolic heart failure               Discharge Procedure Orders (must include Diet, Follow-up, Activity):    Heart healthy diet    Follow-up with HF clinic    Activity as tolerated    Edison Marshall MD

## 2017-07-10 NOTE — TELEPHONE ENCOUNTER
Reason for Disposition   Question about upcoming scheduled test, no triage required and triager able to answer question    Protocols used: ST INFORMATION ONLY CALL-A-AH    Patient called to confirm the date and time of lab appt and heart cath scheduled for tomorrow. He doesn't have any other questions at this time.

## 2017-07-15 ENCOUNTER — OUTPATIENT CASE MANAGEMENT (OUTPATIENT)
Dept: ADMINISTRATIVE | Facility: OTHER | Age: 67
End: 2017-07-15

## 2017-07-17 ENCOUNTER — OUTPATIENT CASE MANAGEMENT (OUTPATIENT)
Dept: ADMINISTRATIVE | Facility: OTHER | Age: 67
End: 2017-07-17

## 2017-07-17 NOTE — PROGRESS NOTES
"7/17/2017:   spoke to patient for follow up.  He reported that he now has [employer] and they "pay for everything".   explained to him that [employer] provides case management services to their patients.  He also reported that he has received all the information that Katelin Dash LCSW mailed to him.  Will close case at this time since patient now has [employer].  He denied any needs at this time.        "

## 2017-07-19 ENCOUNTER — OFFICE VISIT (OUTPATIENT)
Dept: INTERNAL MEDICINE | Facility: CLINIC | Age: 67
End: 2017-07-19
Payer: MEDICARE

## 2017-07-19 ENCOUNTER — TELEPHONE (OUTPATIENT)
Dept: INTERNAL MEDICINE | Facility: CLINIC | Age: 67
End: 2017-07-19

## 2017-07-19 VITALS
TEMPERATURE: 99 F | HEIGHT: 67 IN | BODY MASS INDEX: 37.3 KG/M2 | WEIGHT: 237.63 LBS | DIASTOLIC BLOOD PRESSURE: 62 MMHG | SYSTOLIC BLOOD PRESSURE: 94 MMHG | HEART RATE: 64 BPM | OXYGEN SATURATION: 96 %

## 2017-07-19 DIAGNOSIS — Z13.1 SCREENING FOR DIABETES MELLITUS: ICD-10-CM

## 2017-07-19 DIAGNOSIS — I11.0 HYPERTENSIVE HEART DISEASE WITH HEART FAILURE: ICD-10-CM

## 2017-07-19 DIAGNOSIS — I50.42 CHRONIC COMBINED SYSTOLIC AND DIASTOLIC HEART FAILURE: Chronic | ICD-10-CM

## 2017-07-19 DIAGNOSIS — E66.01 SEVERE OBESITY (BMI 35.0-39.9) WITH COMORBIDITY: Chronic | ICD-10-CM

## 2017-07-19 DIAGNOSIS — R79.9 ABNORMAL FINDING OF BLOOD CHEMISTRY: ICD-10-CM

## 2017-07-19 DIAGNOSIS — Z00.00 VISIT FOR ANNUAL HEALTH EXAMINATION: Primary | ICD-10-CM

## 2017-07-19 DIAGNOSIS — I70.0 ATHEROSCLEROSIS OF AORTA: ICD-10-CM

## 2017-07-19 DIAGNOSIS — D64.9 ANEMIA, UNSPECIFIED TYPE: ICD-10-CM

## 2017-07-19 DIAGNOSIS — Z11.59 NEED FOR HEPATITIS C SCREENING TEST: ICD-10-CM

## 2017-07-19 DIAGNOSIS — I25.10 CORONARY ARTERY DISEASE INVOLVING NATIVE CORONARY ARTERY OF NATIVE HEART WITHOUT ANGINA PECTORIS: ICD-10-CM

## 2017-07-19 DIAGNOSIS — N18.30 CKD (CHRONIC KIDNEY DISEASE), STAGE III: ICD-10-CM

## 2017-07-19 DIAGNOSIS — E78.5 HYPERLIPIDEMIA LDL GOAL <70: ICD-10-CM

## 2017-07-19 DIAGNOSIS — Z12.5 SCREENING FOR PROSTATE CANCER: ICD-10-CM

## 2017-07-19 DIAGNOSIS — R35.1 NOCTURIA: ICD-10-CM

## 2017-07-19 PROCEDURE — 99214 OFFICE O/P EST MOD 30 MIN: CPT | Mod: S$GLB,,, | Performed by: INTERNAL MEDICINE

## 2017-07-19 PROCEDURE — 99999 PR PBB SHADOW E&M-EST. PATIENT-LVL IV: CPT | Mod: PBBFAC,,, | Performed by: INTERNAL MEDICINE

## 2017-07-19 PROCEDURE — 99499 UNLISTED E&M SERVICE: CPT | Mod: S$GLB,,, | Performed by: INTERNAL MEDICINE

## 2017-07-19 RX ORDER — LISINOPRIL 5 MG/1
5 TABLET ORAL DAILY
Qty: 90 TABLET | Refills: 3 | Status: ON HOLD | OUTPATIENT
Start: 2017-07-19 | End: 2018-06-18 | Stop reason: HOSPADM

## 2017-07-19 NOTE — TELEPHONE ENCOUNTER
Needs uro referral for urinary sx: nocturia, hesitancy, sensation of incomplete emptying.  MA to schedule.

## 2017-07-19 NOTE — PROGRESS NOTES
"INTERNAL MEDICINE INITIAL VISIT NOTE      CHIEF COMPLAINT     Chief Complaint   Patient presents with    Establish Care       HPI     Audrey Oneil Jr. is a 67 y.o.  male who presents with a PMHx of  CAD c HF (hx STEMI s/p PCI LAD 2007, most recent echo c sys and diastolic dysfunction c EF 10-15%, s/p AICD c hx VT, recent RHC c normal R and L sided filling pressures, low cardiac output/index, normal pulm pressures) followed by Dr. Marshall/Ricky, HTN, CKD3, obesity c BMI 37, HLD, hx DVT and PE in 2007 (prev completed course of Coumadinn x 1 year per Cards note), GERD, chronic low back pain, anemia, here to est care.    Regarding cards issues as above, has been followed closely by Dr. Marshall.  Currently denies cp.  Has mild HENSLEY at baseline which pt reports is unchanged.  Reports he is taking all meds as rx'ed.    Regarding low back pain, sees Dr. Valenzuela/Ortho who pt reports has him on Hydrocodone 4x/day.    Today c c/o nocturia, urinary hesitancy, and feeling of incomplete emptying.  Says he was supposed to have a "prostate test" but was unable to get it due to issues c his insurance (has [employer] but recently switched to [employer] which is apparently not yet active).  Denies dysuria or polyuria.    Otherwise is s complaints.    Past Medical History:  Past Medical History:   Diagnosis Date    Chronic anticoagulation 5/5/2016    Chronic combined systolic and diastolic heart failure 11/26/2012    EF 10-20% on ECHO 2013    Clotting disorder     Coronary artery disease involving native coronary artery of native heart without angina pectoris 11/26/2012    Cath 10- Stents patent non-obstructive disease Cath 11-12015 non-obstructive disease     Diverticulosis of colon     DVT (deep venous thrombosis), unspecified laterality 11/12/2015    Essential hypertension 11/15/2015    Hyperlipidemia     Hypertensive heart disease with heart failure 5/5/2016    MI (myocardial infarction) 2009    Nicotine abuse     " Obesity 11/26/2012    Pulmonary embolism 2011    Stented coronary artery 11/26/2012    LAD stent placed 10/17/2007        Past Surgical History:  Past Surgical History:   Procedure Laterality Date    APPENDECTOMY      BACK SURGERY      CARDIAC SURGERY      stent    r knee scope      SPINE SURGERY      TONSILLECTOMY         Home Medications:  Prior to Admission medications    Medication Sig Start Date End Date Taking? Authorizing Provider   amiodarone (PACERONE) 200 MG Tab Take 1.5 tablets (300 mg total) by mouth once daily. 11/1/16 11/1/17 Yes Edison Marshall MD   aspirin (ECOTRIN) 325 MG EC tablet Take 325 mg by mouth once daily.   Yes Historical Provider, MD   atorvastatin (LIPITOR) 80 MG tablet TAKE 1 TABLET(80 MG) BY MOUTH EVERY DAY 3/17/17  Yes Nathaly Gama MD   clopidogrel (PLAVIX) 75 mg tablet Take 75 mg by mouth once daily. 6/11/16  Yes Historical Provider, MD   dexlansoprazole (DEXILANT) 60 mg capsule Take 60 mg by mouth once daily.   Yes Historical Provider, MD   docusate sodium (COLACE) 50 MG capsule Take 1 capsule (50 mg total) by mouth once daily. 11/12/15  Yes Arabella Oneil MD   furosemide (LASIX) 40 MG tablet TAKE 1 TABLET BY MOUTH EVERY DAY 7/2/17  Yes Elvin Simms Jr., MD   hydrocodone-acetaminophen 7.5-325mg (NORCO) 7.5-325 mg per tablet 1 tablet every 4 (four) hours as needed.  5/29/17  Yes Historical Provider, MD   lisinopril (PRINIVIL,ZESTRIL) 5 MG tablet Take 1 tablet (5 mg total) by mouth once daily. 11/1/16 11/1/17 Yes Edison Marshall MD   metoprolol succinate (TOPROL-XL) 50 MG 24 hr tablet TAKE 1 TABLET BY MOUTH EVERY DAY 5/22/17  Yes Migue Henry Jr., MD   nitroGLYCERIN (NITROSTAT) 0.4 MG SL tablet PLACE 1 TABLET UNDER THE TOUNGE EVERY 5 MINUTES AS NEEDED FOR CHEST PAIN 6/12/17  Yes Edison Marshall MD   spironolactone (ALDACTONE) 25 MG tablet TAKE 1 TABLET(25 MG) BY MOUTH EVERY DAY 7/18/16  Yes Breezy Gongora MD       Allergies:  Review of patient's allergies  indicates:   Allergen Reactions    Iodine containing multivitamin     Keflex [cephalexin]     Peaches [peach (prunus persica)]     Shellfish containing products     Tuberculin spenser test ppd     Fig tree Rash       Family History:  Family History   Problem Relation Age of Onset    Cancer Mother      colon cancer    Heart disease Mother     Diabetes Mother     Heart disease Father     Stroke Father     Colon polyps Father     Cancer Paternal Grandmother      leukemia    No Known Problems Sister     No Known Problems Daughter     No Known Problems Son     No Known Problems Sister     No Known Problems Son        Social History:  Social History   Substance Use Topics    Smoking status: Former Smoker     Packs/day: 1.00     Years: 45.00     Types: Cigarettes     Quit date: 12/14/2015    Smokeless tobacco: Never Used      Comment: 1-1.5 ppd every day.    Alcohol use No       Review of Systems:  Review of Systems   Constitutional: Negative for appetite change, chills, fatigue, fever and unexpected weight change.   HENT: Negative for congestion, hearing loss and rhinorrhea.    Eyes: Negative for pain and visual disturbance.   Respiratory: Negative for cough, chest tightness, shortness of breath (mild and at baseline) and wheezing.    Cardiovascular: Negative for chest pain, palpitations and leg swelling.   Gastrointestinal: Negative for abdominal distention, abdominal pain, blood in stool, constipation and diarrhea.   Endocrine: Negative for polydipsia and polyuria.   Genitourinary: Positive for difficulty urinating and frequency (nocturia). Negative for decreased urine volume, discharge, dysuria and hematuria.   Musculoskeletal: Positive for back pain. Negative for joint swelling and myalgias.   Skin: Negative for rash.   Neurological: Negative for dizziness, weakness, numbness and headaches.   Psychiatric/Behavioral: Negative for behavioral problems and confusion.       Health Maintenance:  "  Immunizations:   Influenza rec this Fall  TDap is up to date 6/2016  Pneumovax is up to date. 2014, Prevnar 2017  Zostavax is not UTD.     Rec today but pt wants to wait until his [employer] insurance is active    Cancer Screening:  Colonoscopy: is not up to date. Ordered prev by other MD but says he was awaiting cards clearance prior to procedure since on asa and plavix  PSA this week.      PHYSICAL EXAM     BP 94/62 (BP Location: Right arm, Patient Position: Sitting)   Pulse 64   Temp 98.6 °F (37 °C)   Ht 5' 7" (1.702 m)   Wt 107.8 kg (237 lb 10.5 oz)   SpO2 96%   BMI 37.22 kg/m²     GEN - A+OX4, NAD, obese  HEENT - PERRL, EOMI, OP clear  Neck - No thyromegaly or cervical LAD. No thyroid masses felt.  CV - RRR, no m/r/g  Chest - CTAB, no wheezing, crackles, or rhonchi  Abd - S/NT/ND/+BS, central obesity noted  Ext - 2+BDP and radial pulses. No C/C/E.  Neuro - 5/5 BUE and BLE strength.  LN - No LAD appreciated.  Skin - Normal color and texture x slight areas of ecchymosis on B arms, no rash, no skin lesions.      LABS     Lab Results   Component Value Date    LDLCALC 58.2 (L) 07/18/2016     Lab Results   Component Value Date    WBC 9.49 07/10/2017    HGB 12.7 (L) 07/10/2017    HCT 38.2 (L) 07/10/2017    MCV 96 07/10/2017     07/10/2017     CMP  Sodium   Date Value Ref Range Status   07/10/2017 140 136 - 145 mmol/L Final     Potassium   Date Value Ref Range Status   07/10/2017 4.9 3.5 - 5.1 mmol/L Final     Chloride   Date Value Ref Range Status   07/10/2017 106 95 - 110 mmol/L Final     CO2   Date Value Ref Range Status   07/10/2017 25 23 - 29 mmol/L Final     Glucose   Date Value Ref Range Status   07/10/2017 121 (H) 70 - 110 mg/dL Final     BUN, Bld   Date Value Ref Range Status   07/10/2017 44 (H) 8 - 23 mg/dL Final     Creatinine   Date Value Ref Range Status   07/10/2017 1.6 (H) 0.5 - 1.4 mg/dL Final     Calcium   Date Value Ref Range Status   07/10/2017 8.9 8.7 - 10.5 mg/dL Final     Total Protein "   Date Value Ref Range Status   06/12/2017 6.7 6.0 - 8.4 g/dL Final     Albumin   Date Value Ref Range Status   06/12/2017 3.7 3.5 - 5.2 g/dL Final     Total Bilirubin   Date Value Ref Range Status   06/12/2017 0.4 0.1 - 1.0 mg/dL Final     Comment:     For infants and newborns, interpretation of results should be based  on gestational age, weight and in agreement with clinical  observations.  Premature Infant recommended reference ranges:  Up to 24 hours.............<8.0 mg/dL  Up to 48 hours............<12.0 mg/dL  3-5 days..................<15.0 mg/dL  6-29 days.................<15.0 mg/dL       Alkaline Phosphatase   Date Value Ref Range Status   06/12/2017 86 55 - 135 U/L Final     AST   Date Value Ref Range Status   06/12/2017 23 10 - 40 U/L Final     ALT   Date Value Ref Range Status   06/12/2017 33 10 - 44 U/L Final     Anion Gap   Date Value Ref Range Status   07/10/2017 9 8 - 16 mmol/L Final     eGFR if    Date Value Ref Range Status   07/10/2017 51.5 (A) >60 mL/min/1.73 m^2 Final     eGFR if non    Date Value Ref Range Status   07/10/2017 44.5 (A) >60 mL/min/1.73 m^2 Final     Comment:     Calculation used to obtain the estimated glomerular filtration  rate (eGFR) is the CKD-EPI equation. Since race is unknown   in our information system, the eGFR values for   -American and Non--American patients are given   for each creatinine result.           ASSESSMENT/PLAN     Audrey GEORGE Thad Mccarthy is a 67 y.o. male with  Audrey was seen today for establish care.    Diagnoses and all orders for this visit:    Visit for annual health examination       - History and physical exam completed.  Health maintenance reviewed as above.    Chronic combined systolic and diastolic heart failure  Atherosclerosis of aorta  Coronary artery disease involving native coronary artery of native heart without angina pectoris  Hypertensive heart disease with heart failure  -     Cardiac issues  managed by Dr. Marshall, on appropriate med regimen of Lisinopril, Toprol, asa, plavix, lipitor, lasix and aldactone.  Hx VT on amio and s/p AICD.  -  currently euvolemic, cont meds and low na diet, daily wts, fluid restrictions.  - lisinopril (PRINIVIL,ZESTRIL) 5 MG tablet; Take 1 tablet (5 mg total) by mouth once daily.    CKD (chronic kidney disease), stage III       - At baseline, Cr 1.6 on most recent check, cont ACEi and avoid nephrotoxic agents.    Hyperlipidemia LDL goal <70  -       Lab Results   Component Value Date    LDLCALC 58.2 (L) 07/18/2016     At goal but needs updated labs for this year, will check  - Lipid panel; Future; Expected date: 07/19/2017    Severe obesity (BMI 35.0-39.9) with comorbidity  -     Counseled on need for low fat diet and portion control  - Hemoglobin A1c; Future; Expected date: 07/19/2017    Anemia, unspecified type  -     Mild, normocytic.  B12/folate levels not covered by insurance.  Will check iron studies, needs csc pending Cards clearance  - Ferritin; Future; Expected date: 07/19/2017  -     Iron and TIBC; Future; Expected date: 07/19/2017  -     Occult Blood Stool, CA Screen; Future; Expected date: 07/19/2017    Need for hepatitis C screening test  -     HEPATITIS C ANTIBODY; Future; Expected date: 07/19/2017    Nocturia  -     Mult sx concerning for BPH, will have uro eval  - PSA, Screening; Future; Expected date: 07/19/2017  -     URINALYSIS; Future; Expected date: 07/19/2017  -     Ambulatory Referral to Urology  -     Hemoglobin A1c; Future; Expected date: 07/19/2017      HM as above    RTC in 4-5 months, sooner if needed and depending on labs.    January Khan MD  Department of Internal Medicine - [facility]  07/19/2017   1:22 PM

## 2017-07-20 ENCOUNTER — OUTPATIENT CASE MANAGEMENT (OUTPATIENT)
Dept: ADMINISTRATIVE | Facility: OTHER | Age: 67
End: 2017-07-20

## 2017-07-20 ENCOUNTER — TELEPHONE (OUTPATIENT)
Dept: INTERNAL MEDICINE | Facility: CLINIC | Age: 67
End: 2017-07-20

## 2017-07-20 ENCOUNTER — LAB VISIT (OUTPATIENT)
Dept: LAB | Facility: HOSPITAL | Age: 67
End: 2017-07-20
Attending: INTERNAL MEDICINE
Payer: MEDICARE

## 2017-07-20 DIAGNOSIS — D64.9 ANEMIA, UNSPECIFIED TYPE: ICD-10-CM

## 2017-07-20 PROCEDURE — 82270 OCCULT BLOOD FECES: CPT | Mod: TXP

## 2017-07-20 NOTE — TELEPHONE ENCOUNTER
----- Message from Kimberley Alonso sent at 7/20/2017 10:06 AM CDT -----  Contact: Self/[phone]  Patient called to let you know that his HMO goes into effect on 08/01/2017.    Thank You

## 2017-07-20 NOTE — PROGRESS NOTES
7/20/17  Attempted to speak with Mr. Thad Jr as case is closed. Mr. Thad Jr is now enrolled in [facility] that has their own CM team. Verbal message left explaining this CM's case closure, refer to the Customer Service # on back of his [employer] card to be connected with his PHN . Encouraged pt to weigh himself daily & report wt gain 2-3 lbs/24 hrs or 5 lbs/one week.

## 2017-07-21 ENCOUNTER — LAB VISIT (OUTPATIENT)
Dept: LAB | Facility: HOSPITAL | Age: 67
End: 2017-07-21
Attending: INTERNAL MEDICINE
Payer: MEDICARE

## 2017-07-21 DIAGNOSIS — Z12.5 SCREENING FOR PROSTATE CANCER: ICD-10-CM

## 2017-07-21 DIAGNOSIS — Z13.1 SCREENING FOR DIABETES MELLITUS: ICD-10-CM

## 2017-07-21 DIAGNOSIS — Z11.59 NEED FOR HEPATITIS C SCREENING TEST: ICD-10-CM

## 2017-07-21 DIAGNOSIS — R79.9 ABNORMAL FINDING OF BLOOD CHEMISTRY: ICD-10-CM

## 2017-07-21 DIAGNOSIS — E66.01 SEVERE OBESITY (BMI 35.0-39.9) WITH COMORBIDITY: Chronic | ICD-10-CM

## 2017-07-21 DIAGNOSIS — E78.5 HYPERLIPIDEMIA LDL GOAL <70: ICD-10-CM

## 2017-07-21 DIAGNOSIS — D64.9 ANEMIA, UNSPECIFIED TYPE: ICD-10-CM

## 2017-07-21 DIAGNOSIS — R35.1 NOCTURIA: ICD-10-CM

## 2017-07-21 LAB
CHOLEST/HDLC SERPL: 3.8 {RATIO}
COMPLEXED PSA SERPL-MCNC: 1.7 NG/ML
ESTIMATED AVG GLUCOSE: 126 MG/DL
FERRITIN SERPL-MCNC: 181 NG/ML
HBA1C MFR BLD HPLC: 6 %
HCV AB SERPL QL IA: NEGATIVE
HDL/CHOLESTEROL RATIO: 26.5 %
HDLC SERPL-MCNC: 147 MG/DL
HDLC SERPL-MCNC: 39 MG/DL
IRON SERPL-MCNC: 78 UG/DL
LDLC SERPL CALC-MCNC: 57.6 MG/DL
NONHDLC SERPL-MCNC: 108 MG/DL
OB PNL STL: NEGATIVE
SATURATED IRON: 23 %
TOTAL IRON BINDING CAPACITY: 333 UG/DL
TRANSFERRIN SERPL-MCNC: 225 MG/DL
TRIGL SERPL-MCNC: 252 MG/DL

## 2017-07-21 PROCEDURE — 86803 HEPATITIS C AB TEST: CPT | Mod: TXP

## 2017-07-21 PROCEDURE — 83036 HEMOGLOBIN GLYCOSYLATED A1C: CPT | Mod: TXP

## 2017-07-21 PROCEDURE — 82728 ASSAY OF FERRITIN: CPT | Mod: TXP

## 2017-07-21 PROCEDURE — 84153 ASSAY OF PSA TOTAL: CPT | Mod: TXP

## 2017-07-21 PROCEDURE — 83540 ASSAY OF IRON: CPT | Mod: TXP

## 2017-07-21 PROCEDURE — 80061 LIPID PANEL: CPT | Mod: TXP

## 2017-07-21 PROCEDURE — 36415 COLL VENOUS BLD VENIPUNCTURE: CPT | Mod: TXP

## 2017-08-01 RX ORDER — FUROSEMIDE 40 MG/1
TABLET ORAL
Qty: 30 TABLET | Refills: 0 | OUTPATIENT
Start: 2017-08-01

## 2017-08-08 DIAGNOSIS — I50.42 CHRONIC COMBINED SYSTOLIC AND DIASTOLIC HEART FAILURE: Chronic | ICD-10-CM

## 2017-08-08 RX ORDER — FUROSEMIDE 40 MG/1
40 TABLET ORAL DAILY
Qty: 30 TABLET | Refills: 0 | Status: SHIPPED | OUTPATIENT
Start: 2017-08-08 | End: 2017-08-08 | Stop reason: SDUPTHER

## 2017-08-08 NOTE — TELEPHONE ENCOUNTER
----- Message from Donna Benoit MA sent at 8/8/2017  1:14 PM CDT -----  Contact: Leida Alonzo [phone]  She is calling to get a refill on Lasix 40 mg #30,6 refills to [facility].  pt. Last visit 6-12-17. Please call Leida.

## 2017-08-09 RX ORDER — FUROSEMIDE 40 MG/1
TABLET ORAL
Qty: 90 TABLET | Refills: 0 | Status: SHIPPED | OUTPATIENT
Start: 2017-08-09 | End: 2017-11-06 | Stop reason: SDUPTHER

## 2017-08-11 ENCOUNTER — HOSPITAL ENCOUNTER (EMERGENCY)
Facility: HOSPITAL | Age: 67
Discharge: HOME | End: 2017-08-11
Attending: EMERGENCY MEDICINE
Payer: MEDICARE

## 2017-08-11 VITALS
SYSTOLIC BLOOD PRESSURE: 118 MMHG | DIASTOLIC BLOOD PRESSURE: 55 MMHG | BODY MASS INDEX: 39.24 KG/M2 | OXYGEN SATURATION: 96 % | HEIGHT: 67 IN | TEMPERATURE: 98 F | RESPIRATION RATE: 19 BRPM | WEIGHT: 250 LBS | HEART RATE: 60 BPM

## 2017-08-11 DIAGNOSIS — I95.2 HYPOTENSION DUE TO MEDICATION: Primary | ICD-10-CM

## 2017-08-11 DIAGNOSIS — I95.9 HYPOTENSION, UNSPECIFIED HYPOTENSION TYPE: ICD-10-CM

## 2017-08-11 DIAGNOSIS — R42 DIZZY: ICD-10-CM

## 2017-08-11 LAB
ALBUMIN SERPL BCP-MCNC: 3.7 G/DL
ALP SERPL-CCNC: 96 U/L
ALT SERPL W/O P-5'-P-CCNC: 31 U/L
ANION GAP SERPL CALC-SCNC: 12 MMOL/L
AST SERPL-CCNC: 21 U/L
BASOPHILS # BLD AUTO: 0.03 K/UL
BASOPHILS NFR BLD: 0.4 %
BILIRUB SERPL-MCNC: 0.3 MG/DL
BILIRUB UR QL STRIP: NEGATIVE
BUN SERPL-MCNC: 40 MG/DL
CALCIUM SERPL-MCNC: 8.6 MG/DL
CHLORIDE SERPL-SCNC: 107 MMOL/L
CLARITY UR REFRACT.AUTO: CLEAR
CO2 SERPL-SCNC: 22 MMOL/L
COLOR UR AUTO: NORMAL
CREAT SERPL-MCNC: 1.7 MG/DL
DIFFERENTIAL METHOD: ABNORMAL
EOSINOPHIL # BLD AUTO: 0.1 K/UL
EOSINOPHIL NFR BLD: 1.6 %
ERYTHROCYTE [DISTWIDTH] IN BLOOD BY AUTOMATED COUNT: 13.2 %
EST. GFR  (AFRICAN AMERICAN): 47.9 ML/MIN/1.73 M^2
EST. GFR  (NON AFRICAN AMERICAN): 41.4 ML/MIN/1.73 M^2
GLUCOSE SERPL-MCNC: 106 MG/DL
GLUCOSE UR QL STRIP: NEGATIVE
HCT VFR BLD AUTO: 38.5 %
HGB BLD-MCNC: 13.1 G/DL
HGB UR QL STRIP: NEGATIVE
INR PPP: 0.9
KETONES UR QL STRIP: NEGATIVE
LEUKOCYTE ESTERASE UR QL STRIP: NEGATIVE
LIPASE SERPL-CCNC: 44 U/L
LYMPHOCYTES # BLD AUTO: 3 K/UL
LYMPHOCYTES NFR BLD: 35.7 %
MAGNESIUM SERPL-MCNC: 2.2 MG/DL
MCH RBC QN AUTO: 31.4 PG
MCHC RBC AUTO-ENTMCNC: 34 G/DL
MCV RBC AUTO: 92 FL
MONOCYTES # BLD AUTO: 0.8 K/UL
MONOCYTES NFR BLD: 10 %
NEUTROPHILS # BLD AUTO: 4.3 K/UL
NEUTROPHILS NFR BLD: 51.7 %
NITRITE UR QL STRIP: NEGATIVE
PH UR STRIP: 5 [PH] (ref 5–8)
PHOSPHATE SERPL-MCNC: 4 MG/DL
PLATELET # BLD AUTO: 252 K/UL
PMV BLD AUTO: 10 FL
POTASSIUM SERPL-SCNC: 4.1 MMOL/L
PROT SERPL-MCNC: 6.6 G/DL
PROT UR QL STRIP: NEGATIVE
PROTHROMBIN TIME: 9.8 SEC
RBC # BLD AUTO: 4.17 M/UL
SODIUM SERPL-SCNC: 141 MMOL/L
SP GR UR STRIP: 1.01 (ref 1–1.03)
URN SPEC COLLECT METH UR: NORMAL
UROBILINOGEN UR STRIP-ACNC: NEGATIVE EU/DL
WBC # BLD AUTO: 8.38 K/UL

## 2017-08-11 PROCEDURE — 93010 ELECTROCARDIOGRAM REPORT: CPT | Mod: NTX,,, | Performed by: INTERNAL MEDICINE

## 2017-08-11 PROCEDURE — 83690 ASSAY OF LIPASE: CPT | Mod: NTX

## 2017-08-11 PROCEDURE — 83735 ASSAY OF MAGNESIUM: CPT | Mod: NTX

## 2017-08-11 PROCEDURE — 85610 PROTHROMBIN TIME: CPT | Mod: NTX

## 2017-08-11 PROCEDURE — 80053 COMPREHEN METABOLIC PANEL: CPT | Mod: NTX

## 2017-08-11 PROCEDURE — 81003 URINALYSIS AUTO W/O SCOPE: CPT | Mod: NTX

## 2017-08-11 PROCEDURE — 93005 ELECTROCARDIOGRAM TRACING: CPT | Mod: NTX

## 2017-08-11 PROCEDURE — 85025 COMPLETE CBC W/AUTO DIFF WBC: CPT | Mod: NTX

## 2017-08-11 PROCEDURE — 84100 ASSAY OF PHOSPHORUS: CPT | Mod: NTX

## 2017-08-11 PROCEDURE — 99284 EMERGENCY DEPT VISIT MOD MDM: CPT | Mod: 25,NTX

## 2017-08-11 PROCEDURE — 99284 EMERGENCY DEPT VISIT MOD MDM: CPT | Mod: NTX,,, | Performed by: EMERGENCY MEDICINE

## 2017-08-11 NOTE — ED PROVIDER NOTES
Encounter Date: 8/11/2017    SCRIBE #1 NOTE: I, Shannon Hatfield, am scribing for, and in the presence of,  Dr. Gonzalez. I have scribed the following portions of the note - the Resident attestation.       History     Chief Complaint   Patient presents with    Fatigue     right leg pain and report new generlized weakness today    Dizziness     pt reports taking pain meds for leg pain but reports feeling dizzy for the first time while laying in bed     Mr. Oneil is a 67 y.o. male w/ CHF (EF ~10), CKD, w/ AIDC, on many cardiac medications arriving to ED complaining of acute onset vague sensation of dizziness and disorientation.  Patient states that when getting ready to go to bed, he took his medications (multiple blood pressure medications & vicodin for chronic knee pain) and then laid down.  At that point he developed sudden onset of dizziness. He said that although difficult to describe, something did not feel right, and considering his many comorbidities he wanted to come to the ED for evaluation.    When I was evaluating him, he said that he felt that was at baseline.  He was noted to be hypotensive at arrival SBP 80s.  I repeated it and it was in low 90s.      Patient denies CP, palpitations, confusion, SOB, abdominal pain, f/c/n/v, cold/URI symptoms, changes to bowel/urinary patterns.        Review of patient's allergies indicates:   Allergen Reactions    Iodine containing multivitamin     Keflex [cephalexin]     Peaches [peach (prunus persica)]     Shellfish containing products     Tuberculin spenser test ppd     Fig tree Rash     Past Medical History:   Diagnosis Date    Chronic anticoagulation 5/5/2016    Chronic combined systolic and diastolic heart failure 11/26/2012    EF 10-20% on ECHO 2013    Clotting disorder     Coronary artery disease involving native coronary artery of native heart without angina pectoris 11/26/2012    Cath 10- Stents patent non-obstructive disease Cath 11-12015  non-obstructive disease     Diverticulosis of colon     DVT (deep venous thrombosis), unspecified laterality 11/12/2015    Essential hypertension 11/15/2015    Hyperlipidemia     Hypertensive heart disease with heart failure 5/5/2016    MI (myocardial infarction) 2009    Nicotine abuse     Obesity 11/26/2012    Pulmonary embolism 2011    Stented coronary artery 11/26/2012    LAD stent placed 10/17/2007      Past Surgical History:   Procedure Laterality Date    APPENDECTOMY      BACK SURGERY      CARDIAC SURGERY      stent    r knee scope      SPINE SURGERY      TONSILLECTOMY       Family History   Problem Relation Age of Onset    Cancer Mother      colon cancer    Heart disease Mother     Diabetes Mother     Heart disease Father     Stroke Father     Colon polyps Father     Cancer Paternal Grandmother      leukemia    No Known Problems Sister     No Known Problems Daughter     No Known Problems Son     No Known Problems Sister     No Known Problems Son      Social History   Substance Use Topics    Smoking status: Former Smoker     Packs/day: 1.00     Years: 45.00     Types: Cigarettes     Quit date: 12/14/2015    Smokeless tobacco: Never Used      Comment: 1-1.5 ppd every day.    Alcohol use No     Review of Systems   Constitutional: Negative for activity change, chills, diaphoresis and fever.   HENT: Negative for postnasal drip, rhinorrhea and sore throat.    Eyes: Negative for photophobia and visual disturbance.   Respiratory: Negative for cough, choking, chest tightness and shortness of breath.    Cardiovascular: Negative for chest pain, palpitations and leg swelling.   Gastrointestinal: Negative for abdominal distention, abdominal pain, constipation and diarrhea.   Genitourinary: Negative for dysuria and flank pain.   Musculoskeletal: Positive for joint swelling. Negative for back pain and myalgias.        Chronic pain/swelling to R knee.   Skin: Negative for pallor and wound.    Neurological: Positive for dizziness and light-headedness. Negative for syncope and weakness.   Psychiatric/Behavioral: Negative for confusion and decreased concentration.       Physical Exam     Initial Vitals [08/11/17 0343]   BP Pulse Resp Temp SpO2   (!) 89/50 68 18 97.9 °F (36.6 °C) 97 %      MAP       63         Physical Exam    Nursing note and vitals reviewed.  Constitutional: He appears well-developed and well-nourished. He is not diaphoretic. No distress.   Obese.   HENT:   Head: Normocephalic and atraumatic.   Mouth/Throat: Oropharynx is clear and moist.   Eyes: EOM are normal. Pupils are equal, round, and reactive to light.   Conjunctival pallor   Neck: Normal range of motion. Neck supple. No JVD present.   Cardiovascular: Normal rate, regular rhythm, normal heart sounds and intact distal pulses.   Strong distal pulses.   Pulmonary/Chest: Breath sounds normal. He has no rales.   Abdominal: Soft. There is no tenderness.   Obese.   Musculoskeletal: Normal range of motion. He exhibits no edema or tenderness.   Neurological: He is alert and oriented to person, place, and time.   Skin: Skin is warm and dry. No erythema.   Psychiatric: He has a normal mood and affect. Thought content normal.         ED Course   Procedures  Labs Reviewed   CBC W/ AUTO DIFFERENTIAL - Abnormal; Notable for the following:        Result Value    RBC 4.17 (*)     Hemoglobin 13.1 (*)     Hematocrit 38.5 (*)     MCH 31.4 (*)     All other components within normal limits   COMPREHENSIVE METABOLIC PANEL - Abnormal; Notable for the following:     CO2 22 (*)     BUN, Bld 40 (*)     Creatinine 1.7 (*)     Calcium 8.6 (*)     eGFR if  47.9 (*)     eGFR if non  41.4 (*)     All other components within normal limits   LIPASE   MAGNESIUM   PROTIME-INR   PHOSPHORUS   URINALYSIS   PROTIME-INR    Narrative:     pt add on per Kade Nunn MD [id number]   08/11/2017  04:57    PHOSPHORUS    Narrative:      pt add on per Kade Nunn MD [id number]   08/11/2017  04:57   add on per dr Nunn order [id number] lipase, mag order Mag @0549 &   order [id number] phos 8/11/17   MAGNESIUM    Narrative:     pt add on per Kade Nunn MD [id number]   08/11/2017  04:57   add on per dr Nunn order [id number] lipase, mag order Mag @0549 &   order [id number] phos 8/11/17   LIPASE    Narrative:     pt add on per Kade Nunn MD [id number]   08/11/2017  04:57   add on per dr Nunn order [id number] lipase, mag order Mag @0549 &   order [id number] phos 8/11/17     EKG Readings: (Independently Interpreted)   EKG: NS at 64 with LAD, nonspecific intraventricular conduction delay       X-Rays:   Independently Interpreted Readings:   Chest X-Ray: No infiltrates.  No acute abnormalities.     Medical Decision Making:   History:   Old Medical Records: I decided to obtain old medical records.  Clinical Tests:   Lab Tests: Ordered and Reviewed  Radiological Study: Ordered and Reviewed  Medical Tests: Ordered and Reviewed       APC / Resident Notes:   PGY-1 MDM    Pt is a 67 y.o. male presenting with sudden onset dizziness after taking his evening medications and laying down.    On arrival, patient was hypotensive (89/50, MAP 63).  Multiple repeat pressure w/ SBP 90s.  Throughout, patient not confused and perfusing well.  He stated he felt to be at baseline.  Lungs clear.    Due to the patient's many comorbidities, DDx includes but not limited to heart failure, medication reaction, anemia, metabolic, or atypical ACS.    Based on the clinical picture, I am most concerned for cardiac/metabolic etiology and will workup accordingly.    Kade Nunn MD  Emergency Medicine, PGY-1  3:54 AM 8/11/2017    PGY-1 UPDATE:    Patient reassessed and doing well.    Labs not indicative of acute pathology.  Pt w/ baseline anemia and CKD.  UA not concerning.  CXR w/ no edema.  EKG w/ no changes.    Patient resting comfortably w/ SBP  120s.  Orthostatics pressures taken - patient not orthostatic.    Patient feels that he is at baseline.      Based on the clinical picture, and r/o of other causes, patient's episode of dizziness/disorientaiton could have been medication induced.  He noted that he is very careful about his fluid intake.      We discussed his disposition, and agree that he is safe to be discharged home.    Kade Nunn MD  Emergency Medicine, PGY-1  6:20 AM 8/11/2017         Scribe Attestation:   Scribe #1: I performed the above scribed service and the documentation accurately describes the services I performed. I attest to the accuracy of the note.    Attending Attestation:   Physician Attestation Statement for Resident:  As the supervising MD   Physician Attestation Statement: I have personally seen and examined this patient.   I agree with the above history. -:   As the supervising MD I agree with the above PE.    As the supervising MD I agree with the above treatment, course, plan, and disposition.   -: This is an emergent evaluation of a 67 y.o. male presenting with dizziness, found to be hypotensive. Labs unremarkable. Pt denies CP or SOB. He reports significant improvement of symptoms while in the ED. I do not think this was a cardiac event. Pt's BP improved. I believe this is 2/2 medication reaction. Pt stable for d/c.            Physician Attestation for Scribe:  Physician Attestation Statement for Scribe #1: I, Dr. Gonzalez, reviewed documentation, as scribed by Shannon Hatfield in my presence, and it is both accurate and complete.                 ED Course     Clinical Impression:   The primary encounter diagnosis was Hypotension due to medication. Diagnoses of Dizzy and Hypotension, unspecified hypotension type were also pertinent to this visit.                           Yasmeen Gonzalez MD  08/16/17 [id number]

## 2017-08-11 NOTE — ED NOTES
Pain: Denies at present.    Psychosocial: Patient is calm and cooperative. Patients insight and judgement are appropriate to situation. Appears clean, well maintained, with clothing appropriate to environment. No evidence of delusions, hallucinations, or psychosis.    Neuro: Eyes open spontaneously. Awake, alert, oriented x 4. Speech clear and appropriate. Tolerating saliva secretions well. Able to follow commands, demonstrating ability to actively and appropriately communicate within context of current conversation. Symmetrical facial muscles. Moving all extremities well with no noted weakness. Adequate muscle tone present. Movement is purposeful. No evidence of impaired sensation. Responds to external stimuli with appropriate reflexes.     Airway: Bilateral chest rise and fall. RR regular and non-labored. Air entry patent and clear x 5 lobes of the lungs. No crepitus or subcutaneous emphysema noted on palpation.     Circulatory: Skin warm, dry, and pink. Apical and radial pulses strong and regular. Capillary refill/skin blanching less than 3 seconds to distal of 4 extremities. Placed on CM in NSR without ectopy.    Abdomen: Abdomen obese, soft and non-distended. Positive normo-active bowel sounds x 4 quadrants.     Urinary: Patient reports routine urination without pain, frequency, or urgency. Voids independently. Reports urine appears julita/yellow in color.    Extremities: No redness, heat, swelling, deformity, or pain.     Skin: Intact with no bruising/discolorations noted.

## 2017-08-11 NOTE — ED NOTES
"67 year old male pt presents to the ed with complaints of " not feeling well."  Pt denies any chest pain sob or nausea. Pt is awake alert and oriented x 4 and presents to the ed alone. Pt does state that he has medical heart hx.   "

## 2017-09-08 ENCOUNTER — TELEPHONE (OUTPATIENT)
Dept: ELECTROPHYSIOLOGY | Facility: CLINIC | Age: 67
End: 2017-09-08

## 2017-09-08 NOTE — TELEPHONE ENCOUNTER
Returned the patients call on this morning.  Patient was calling to find out when his next ICD check is due.  Informed the patient he is routinely checked remotely and he is up to date on this.  Informed the patient that he is over due for a clinic appointment with Dr. Pal as to he has not returned to the clinic to see the doctor.  Informed the patient of this as well as let him know a message was being sent to the doctors MA to schedule the clinic appointment for him.  Patient verbalized understanding to all of the above.

## 2017-09-08 NOTE — TELEPHONE ENCOUNTER
----- Message from Riana Miguel sent at 9/7/2017  4:20 PM CDT -----  Contact: patient called  He has a [facility] [employer].    Chase Larios  ----- Message -----  From: Tammy Coughlin MA  Sent: 9/7/2017  12:23 PM  To: [facility] Arrhythmia Device Staff    Please call the patient at home he need to talk to someone about  His defibrillator. Thank you.

## 2017-09-09 ENCOUNTER — HOSPITAL ENCOUNTER (EMERGENCY)
Facility: HOSPITAL | Age: 67
Discharge: HOME | End: 2017-09-10
Attending: EMERGENCY MEDICINE
Payer: MEDICARE

## 2017-09-09 VITALS
HEIGHT: 67 IN | BODY MASS INDEX: 36.1 KG/M2 | TEMPERATURE: 98 F | WEIGHT: 230 LBS | DIASTOLIC BLOOD PRESSURE: 59 MMHG | SYSTOLIC BLOOD PRESSURE: 117 MMHG | HEART RATE: 66 BPM | OXYGEN SATURATION: 96 % | RESPIRATION RATE: 17 BRPM

## 2017-09-09 DIAGNOSIS — M25.531 RIGHT WRIST PAIN: ICD-10-CM

## 2017-09-09 DIAGNOSIS — S51.811A LACERATION OF RIGHT FOREARM: ICD-10-CM

## 2017-09-09 DIAGNOSIS — T14.8XXA PUNCTURE WOUND: Primary | ICD-10-CM
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
## 2017-09-09 PROCEDURE — 99284 EMERGENCY DEPT VISIT MOD MDM: CPT | Mod: NTX,,, | Performed by: PHYSICIAN ASSISTANT

## 2017-09-09 PROCEDURE — 96372 THER/PROPH/DIAG INJ SC/IM: CPT | Mod: NTX

## 2017-09-09 PROCEDURE — 29125 APPL SHORT ARM SPLINT STATIC: CPT | Mod: RT,NTX

## 2017-09-09 PROCEDURE — 99283 EMERGENCY DEPT VISIT LOW MDM: CPT | Mod: 25,NTX

## 2017-09-10 PROCEDURE — S0077 INJECTION, CLINDAMYCIN PHOSP: HCPCS | Mod: NTX | Performed by: PHYSICIAN ASSISTANT

## 2017-09-10 PROCEDURE — 25000003 PHARM REV CODE 250: Mod: NTX | Performed by: PHYSICIAN ASSISTANT

## 2017-09-10 RX ORDER — CLINDAMYCIN PHOSPHATE 150 MG/ML
600 INJECTION, SOLUTION INTRAVENOUS
Status: COMPLETED | OUTPATIENT
Start: 2017-09-10 | End: 2017-09-10

## 2017-09-10 RX ORDER — CLINDAMYCIN PHOSPHATE 600 MG/50ML
600 INJECTION, SOLUTION INTRAVENOUS ONCE
Status: DISCONTINUED | OUTPATIENT
Start: 2017-09-10 | End: 2017-09-10

## 2017-09-10 RX ORDER — AMOXICILLIN AND CLAVULANATE POTASSIUM 875; 125 MG/1; MG/1
1 TABLET, FILM COATED ORAL 2 TIMES DAILY
Qty: 14 TABLET | Refills: 0 | Status: SHIPPED | OUTPATIENT
Start: 2017-09-10 | End: 2017-09-12

## 2017-09-10 RX ADMIN — CLINDAMYCIN PHOSPHATE 600 MG: 150 INJECTION, SOLUTION INTRAMUSCULAR; INTRAVENOUS at 12:09

## 2017-09-10 NOTE — ED NOTES
LOC: The patient is awake, alert, and oriented to place, time, situation. Affect is appropriate.  Speech is appropriate and clear.     APPEARANCE: Patient resting comfortably in no acute distress.  Patient is clean and well groomed.    SKIN: The skin is warm and dry; color consistent with ethnicity.  Patient has normal skin turgor and moist mucus membranes.  Skin intact; no breakdown or bruising noted.  Small laceration to right wrist.  Pt cleansed and dressed.  Site intact.  Pt concerned of possible infection. with     MUSCULOSKELETAL: Patient moving upper and lower extremities without difficulty.  Denies weakness.     RESPIRATORY: Airway is open and patent. Respirations spontaneous, even, easy, and non-labored.  Patient has a normal effort and rate.  No accessory muscle use noted. Denies cough.     CARDIAC:  No peripheral edema noted. No complaints of chest pain.      ABDOMEN: Soft and non tender to palpation.  No distention noted.     NEUROLOGIC: Eyes open spontaneously.  Behavior appropriate to situation.  Follows commands; facial expression symmetrical.  Purposeful motor response noted; normal sensation in all extremities.

## 2017-09-10 NOTE — DISCHARGE INSTRUCTIONS
Take oral antibiotics (Augmentin) 2 times a day which is every 12 hours for the next 7 days.  Keep wrist immobilize in a splint.   Follow up closely with hand clinic in 3 days or as soon as possible for further evaluation and management. Wash and clean area 2 times a day.  Return to the emergency department for new worsening signs such as redness, swelling, pain, drainage, or fevers.    Future Appointments  Date Time Provider Department Center   9/12/2017 10:00 AM LAB, APPOINTMENT [facility] VNP [facility]   9/12/2017 11:30 AM Edison Marshall MD [facility] [address]   10/4/2017 8:00 AM HOME MONITOR DEVICE CHECK, [facility] [address]       Our goal in the emergency department is to always give you outstanding care and exceptional service. You may receive a survey by mail or e-mail in the next week regarding your experience in our ED. We would greatly appreciate your completing and returning the survey. Your feedback provides us with a way to recognize our staff who give very good care and it helps us learn how to improve when your experience was below our aspiration of excellence.

## 2017-09-10 NOTE — ED TRIAGE NOTES
Comes to the ED for c/o small laceration, 5 cm to right wrist.  No bleeding noted, site cleaned and dressed by pt.  Concerned that he is having pain, taking hydrocodone to control.

## 2017-09-10 NOTE — ED PROVIDER NOTES
Encounter Date: 9/9/2017    SCRIBE #1 NOTE: I, Shannon Hatfield, am scribing for, and in the presence of,  Dr. Witt. I have scribed the following portions of the note - the APC attestation. Other sections scribed: X-ray.       History     Chief Complaint   Patient presents with    Laceration     was working with sheet metal and cut his right wrist; sheet metal had gasoline on it and is complaining of pain to wrist, bleeding is controlled     Patient is a 67-year-old male with a past medical history of heart failure, HLD, PE, MI, CAD who presents the ED with laceration to right forearm and right wrist pain.  Patient states that around 1 PM, he was cutting sheet metal and sustained an injury to his right forearm.  Bleeding was significant at the time, but patient was able to control it.  He states that he cleaned area with hydrogen peroxide and has been using mupirocin ointment.  Patient was concerned because he was experiencing pain to the area and took a hydrocodone.  However currently patient complains of 0/10 pain to the area.  He denies any numbness or tingling.  He does endorse pain with palpation of his right wrist.  He states his tetanus shot is up-to-date.          Review of patient's allergies indicates:   Allergen Reactions    Iodine containing multivitamin     Keflex [cephalexin]     Peaches [peach (prunus persica)]     Shellfish containing products     Tuberculin spenser test ppd     Fig tree Rash     Past Medical History:   Diagnosis Date    Chronic anticoagulation 5/5/2016    Chronic combined systolic and diastolic heart failure 11/26/2012    EF 10-20% on ECHO 2013    Clotting disorder     Coronary artery disease involving native coronary artery of native heart without angina pectoris 11/26/2012    Cath 10- Stents patent non-obstructive disease Cath 11-12015 non-obstructive disease     Diverticulosis of colon     DVT (deep venous thrombosis), unspecified laterality 11/12/2015     Essential hypertension 11/15/2015    Hyperlipidemia     Hypertensive heart disease with heart failure 5/5/2016    MI (myocardial infarction) 2009    Nicotine abuse     Obesity 11/26/2012    Pulmonary embolism 2011    Stented coronary artery 11/26/2012    LAD stent placed 10/17/2007      Past Surgical History:   Procedure Laterality Date    APPENDECTOMY      BACK SURGERY      CARDIAC SURGERY      stent    r knee scope      SPINE SURGERY      TONSILLECTOMY       Family History   Problem Relation Age of Onset    Cancer Mother      colon cancer    Heart disease Mother     Diabetes Mother     Heart disease Father     Stroke Father     Colon polyps Father     Cancer Paternal Grandmother      leukemia    No Known Problems Sister     No Known Problems Daughter     No Known Problems Son     No Known Problems Sister     No Known Problems Son      Social History   Substance Use Topics    Smoking status: Former Smoker     Packs/day: 1.00     Years: 45.00     Types: Cigarettes     Quit date: 12/14/2015    Smokeless tobacco: Never Used      Comment: 1-1.5 ppd every day.    Alcohol use No     Review of Systems   Constitutional: Negative for chills and fever.   HENT: Negative for sore throat.    Eyes: Negative for visual disturbance.   Respiratory: Negative for shortness of breath.    Cardiovascular: Negative for chest pain.   Gastrointestinal: Negative for nausea.   Genitourinary: Negative for dysuria.   Musculoskeletal: Positive for arthralgias. Negative for back pain.   Skin: Positive for wound. Negative for rash.   Neurological: Negative for weakness.   Hematological: Does not bruise/bleed easily.       Physical Exam     Initial Vitals [09/09/17 2156]   BP Pulse Resp Temp SpO2   (!) 117/59 66 17 98.1 °F (36.7 °C) 96 %      MAP       78.33         Physical Exam    Vitals reviewed.  Constitutional: He appears well-developed and well-nourished. He is not diaphoretic. No distress.   HENT:   Head:  Normocephalic and atraumatic.   Nose: Nose normal.   Eyes: Conjunctivae and EOM are normal.   Neck: Normal range of motion.   Cardiovascular: Normal rate, regular rhythm and normal heart sounds. Exam reveals no friction rub.    No murmur heard.  Pulses:       Radial pulses are 2+ on the right side, and 2+ on the left side.   Pulmonary/Chest: Breath sounds normal. No respiratory distress. He has no wheezes. He has no rales.   Abdominal: Soft. Bowel sounds are normal. He exhibits no distension. There is no tenderness.   Musculoskeletal: Normal range of motion.        Right wrist: He exhibits bony tenderness. He exhibits normal range of motion, no tenderness, no swelling, no effusion and no deformity.        Arms:  Neurological: He is alert and oriented to person, place, and time. He has normal strength. No sensory deficit.   Sensations grossly intact.   Skin: Skin is warm and dry. No erythema.   Psychiatric: He has a normal mood and affect. Thought content normal.         ED Course   Procedures  Labs Reviewed - No data to display       X-Rays:   Independently Interpreted Readings:   Other Readings:  R wrist x-ray: No radiopaque foreign body noted    Medical Decision Making:   History:   Old Medical Records: I decided to obtain old medical records.  Independently Interpreted Test(s):   I have ordered and independently interpreted X-rays - see prior notes.  Clinical Tests:   Radiological Study: Ordered and Reviewed    Imaging Results          X-Ray Wrist Complete Right (Final result)  Result time 09/09/17 23:37:28    Final result by Jefe Reyes MD (09/09/17 23:37:28)                 Impression:        No acute bony abnormality.      Electronically signed by: Jefe Reyes  Date:     09/09/17  Time:    23:37              Narrative:    COMPARISON: None.    CLINICAL INFORMATION: Right wrist pain.    FINDINGS: Three views of the right wrist.  No evidence of acute fracture or dislocation. Soft tissues are  unremarkable. No radiopaque foreign body.                                 APC / Resident Notes:   Right wrist x-ray with no acute bony abnormality.  No radiopaque foreign body seen.    No indication to repair superficial laceration.  Patient will be given clindamycin IM injection here.  I will prescribe Augmentin for home to cover for infection prophylactically. Thumb spica splint provided for immobilization and support. Patient is to closely follow up with hand clinic, and he voices understanding.  He can continue his pain medication as needed for pain relief.  Strict ED return precaution given, and patient voiced understanding.  All questions answered.  He is comfortable with plan and stable for discharge.  I reviewed patient's chart and discussed this case with my supervising CARMEN Metz Attestation:   Scribe #1: I performed the above scribed service and the documentation accurately describes the services I performed. I attest to the accuracy of the note.    Attending Attestation:   Physician Attestation Statement for Resident:  As the supervising MD   Physician Attestation Statement: I have personally seen and examined this patient.   I agree with the above history. -: 66 yo M presents for evaluation of laceration to his R forearm sustained earlier today.   As the supervising MD I agree with the above PE.    As the supervising MD I agree with the above treatment, course, plan, and disposition.          Physician Attestation for Scribe:  Physician Attestation Statement for Scribe #1: I, Dr. Witt, reviewed documentation, as scribed by Shannon Hatfield in my presence, and it is both accurate and complete.                 ED Course      Clinical Impression:   The primary encounter diagnosis was Puncture wound. A diagnosis of Right wrist pain was also pertinent to this visit.    Disposition:   Disposition: Discharged  Condition: Stable                        Khalida Sin PA-C  09/10/17 0113

## 2017-09-12 ENCOUNTER — LAB VISIT (OUTPATIENT)
Dept: LAB | Facility: HOSPITAL | Age: 67
End: 2017-09-12
Attending: INTERNAL MEDICINE
Payer: MEDICARE

## 2017-09-12 ENCOUNTER — OFFICE VISIT (OUTPATIENT)
Dept: TRANSPLANT | Facility: CLINIC | Age: 67
End: 2017-09-12
Payer: MEDICARE

## 2017-09-12 VITALS
HEIGHT: 67 IN | DIASTOLIC BLOOD PRESSURE: 55 MMHG | WEIGHT: 236.56 LBS | HEART RATE: 63 BPM | SYSTOLIC BLOOD PRESSURE: 95 MMHG | BODY MASS INDEX: 37.13 KG/M2

## 2017-09-12 DIAGNOSIS — I25.5 CARDIOMYOPATHY, ISCHEMIC: Chronic | ICD-10-CM

## 2017-09-12 DIAGNOSIS — I50.42 CHRONIC COMBINED SYSTOLIC AND DIASTOLIC HEART FAILURE: Primary | Chronic | ICD-10-CM

## 2017-09-12 DIAGNOSIS — I50.22 CHRONIC SYSTOLIC CONGESTIVE HEART FAILURE: ICD-10-CM

## 2017-09-12 DIAGNOSIS — E78.5 HYPERLIPIDEMIA LDL GOAL <70: ICD-10-CM

## 2017-09-12 DIAGNOSIS — Z95.810 AICD (AUTOMATIC CARDIOVERTER/DEFIBRILLATOR) PRESENT: ICD-10-CM

## 2017-09-12 DIAGNOSIS — I25.10 CORONARY ARTERY DISEASE INVOLVING NATIVE CORONARY ARTERY OF NATIVE HEART WITHOUT ANGINA PECTORIS: ICD-10-CM

## 2017-09-12 DIAGNOSIS — E66.01 SEVERE OBESITY (BMI 35.0-39.9) WITH COMORBIDITY: Chronic | ICD-10-CM

## 2017-09-12 LAB — BNP SERPL-MCNC: 64 PG/ML

## 2017-09-12 PROCEDURE — 36415 COLL VENOUS BLD VENIPUNCTURE: CPT | Mod: TXP

## 2017-09-12 PROCEDURE — 3078F DIAST BP <80 MM HG: CPT | Mod: NTX,S$GLB,, | Performed by: INTERNAL MEDICINE

## 2017-09-12 PROCEDURE — 83880 ASSAY OF NATRIURETIC PEPTIDE: CPT | Mod: TXP

## 2017-09-12 PROCEDURE — 3074F SYST BP LT 130 MM HG: CPT | Mod: NTX,S$GLB,, | Performed by: INTERNAL MEDICINE

## 2017-09-12 PROCEDURE — 99999 PR PBB SHADOW E&M-EST. PATIENT-LVL III: CPT | Mod: PBBFAC,TXP,, | Performed by: INTERNAL MEDICINE

## 2017-09-12 PROCEDURE — 99214 OFFICE O/P EST MOD 30 MIN: CPT | Mod: NTX,S$GLB,, | Performed by: INTERNAL MEDICINE

## 2017-09-12 PROCEDURE — 99499 UNLISTED E&M SERVICE: CPT | Mod: S$PBB,,, | Performed by: INTERNAL MEDICINE

## 2017-09-12 PROCEDURE — 3008F BODY MASS INDEX DOCD: CPT | Mod: NTX,S$GLB,, | Performed by: INTERNAL MEDICINE

## 2017-09-12 NOTE — PROGRESS NOTES
Subjective:   Transplant status: active    HPI:  Mr. Oneil is a very pleasant 64 yo male with a hx of CAD s/p STEMI(s/p PCI LAD 2007), CHF/ICM and remote MI, quit smoking Dec 2015,  PE (2010, s/p 1-yr coumadin), HTN, DLP and s/p ICD [facility] placement due to VT who comes for a regular follow-up.  I had seen him 3 months ago and was worried that he was declining and his HF symptoms were worsening and therefore I had ordered a repeat  CPX that showed low Peak VO2 of 7.5ml/kg/min but AT was not attained and RER was only 0.67 (poor effort). Most recent 2D echo showed severely depressed LV function with an EF=15%,  LV with a LVEDD of 5.9  cm, normal RV size and function. Clinically reports NYHA class II-III symptoms.  RHC done showed normal left and right sided filling presssures. Low normal CO/CI. States he feels better since I last saw him. No HF readmissions    Past Medical History:   Diagnosis Date    Chronic anticoagulation 5/5/2016    Chronic combined systolic and diastolic heart failure 11/26/2012    EF 10-20% on ECHO 2013    Clotting disorder     Coronary artery disease involving native coronary artery of native heart without angina pectoris 11/26/2012    Cath 10- Stents patent non-obstructive disease Cath 11-12015 non-obstructive disease     Diverticulosis of colon     DVT (deep venous thrombosis), unspecified laterality 11/12/2015    Essential hypertension 11/15/2015    Hyperlipidemia     Hypertensive heart disease with heart failure 5/5/2016    MI (myocardial infarction) 2009    Nicotine abuse     Obesity 11/26/2012    Pulmonary embolism 2011    Stented coronary artery 11/26/2012    LAD stent placed 10/17/2007      Past Surgical History:   Procedure Laterality Date    APPENDECTOMY      BACK SURGERY      CARDIAC SURGERY      stent    r knee scope      SPINE SURGERY      TONSILLECTOMY         Review of Systems   Constitution: Negative. Negative for chills, decreased appetite,  "diaphoresis, fever, weakness, malaise/fatigue, night sweats, weight gain and weight loss.   Eyes: Negative.    Cardiovascular: Positive for dyspnea on exertion. Negative for chest pain, claudication, cyanosis, irregular heartbeat, leg swelling, near-syncope, orthopnea, palpitations, paroxysmal nocturnal dyspnea and syncope.   Respiratory: Negative for cough, hemoptysis and shortness of breath.    Endocrine: Negative.    Hematologic/Lymphatic: Negative.    Skin: Negative for color change, dry skin and nail changes.   Musculoskeletal: Negative.    Gastrointestinal: Negative.    Genitourinary: Negative.        Objective:   Blood pressure (!) 95/55, pulse 63, height 5' 7" (1.702 m), weight 107.3 kg (236 lb 8.9 oz).body mass index is 37.05 kg/m².  Physical Exam   Constitutional: He appears well-developed.   BP (!) 95/55   Pulse 63   Ht 5' 7" (1.702 m)   Wt 107.3 kg (236 lb 8.9 oz)   BMI 37.05 kg/m²      HENT:   Head: Normocephalic.   Neck: No JVD present. Carotid bruit is not present.   Cardiovascular: Regular rhythm, normal heart sounds and normal pulses.  PMI is displaced.    No murmur heard.  Pulmonary/Chest: Effort normal and breath sounds normal. No respiratory distress. He has no wheezes. He has no rales.   Abdominal: Soft. Bowel sounds are normal. He exhibits distension. There is no tenderness. There is no rebound.   Musculoskeletal: He exhibits no edema.   Neurological: He is alert.   Skin: Skin is warm.   Vitals reviewed.      Labs:    Chemistry        Component Value Date/Time     08/11/2017 0401    K 4.1 08/11/2017 0401     08/11/2017 0401    CO2 22 (L) 08/11/2017 0401    BUN 40 (H) 08/11/2017 0401    CREATININE 1.7 (H) 08/11/2017 0401     08/11/2017 0401        Component Value Date/Time    CALCIUM 8.6 (L) 08/11/2017 0401    ALKPHOS 96 08/11/2017 0401    AST 21 08/11/2017 0401    ALT 31 08/11/2017 0401    BILITOT 0.3 08/11/2017 0401    ESTGFRAFRICA 47.9 (A) 08/11/2017 0401    EGFRNONAA " 41.4 (A) 08/11/2017 0401          Magnesium   Date Value Ref Range Status   08/11/2017 2.2 1.6 - 2.6 mg/dL Final     Lab Results   Component Value Date    WBC 8.38 08/11/2017    HGB 13.1 (L) 08/11/2017    HCT 38.5 (L) 08/11/2017     08/11/2017     Lab Results   Component Value Date    INR 0.9 08/11/2017    INR 0.9 07/10/2017    INR 0.9 12/08/2016     BNP   Date Value Ref Range Status   06/12/2017 57 0 - 99 pg/mL Final     Comment:     Values of less than 100 pg/ml are consistent with non-CHF populations.   04/14/2017 81 0 - 99 pg/mL Final     Comment:     Values of less than 100 pg/ml are consistent with non-CHF populations.   03/30/2017 63 0 - 99 pg/mL Final     Comment:     Values of less than 100 pg/ml are consistent with non-CHF populations.     No results found for: LDH  No results found for this or any previous visit.  No results found for this or any previous visit.    Assessment:      1. Chronic combined systolic and diastolic heart failure    2. Cardiomyopathy, ischemic    3. Coronary artery disease involving native coronary artery of native heart without angina pectoris    4. Hyperlipidemia LDL goal <70    5. Severe obesity (BMI 35.0-39.9) with comorbidity    6. AICD (automatic cardioverter/defibrillator) present        Plan:   67 y/o male with ICMP, HFrEF stage D with NYHA class II-III symptoms with normal left and right sided filling pressures by recent RHC. Low normal CI.  Continue current HF regimen  Recommend 2 gram sodium restriction and 1500cc fluid restriction.  Encourage physical activity with graded exercise program.  Requested patient to weigh themselves daily, and to notify us if their weight increases by more than 3 lbs in 1 day or 5 lbs in 1 week.   RTC in 3 months with labs     Edison Marshall MD

## 2017-09-12 NOTE — Clinical Note
September 12, 2017        Stephanie Jade             [facility]  [address]  [facility] [address]  Phone: [phone]   Patient: Audrey Oneil Jr.   MR Number: [id number]   YOB: 1950   Date of Visit: 9/12/2017       Dear Dr. Stephanie Jade    Thank you for referring Audrey Oneil to me for evaluation. Attached you will find relevant portions of my assessment and plan of care.    If you have questions, please do not hesitate to call me. I look forward to following Audrey Oneil along with you.    Sincerely,    Edison Marshall MD    Enclosure    If you would like to receive this communication electronically, please contact [email or web address] or [phone] to request Editorially Link access.    Editorially Link is a tool which provides read-only access to select patient information with whom you have a relationship. Its easy to use and provides real time access to review your patients record including encounter summaries, notes, results, and demographic information.    If you feel you have received this communication in error or would no longer like to receive these types of communications, please e-mail [email or web address]

## 2017-09-13 DIAGNOSIS — I25.10 CORONARY ARTERY DISEASE, ANGINA PRESENCE UNSPECIFIED, UNSPECIFIED VESSEL OR LESION TYPE, UNSPECIFIED WHETHER NATIVE OR TRANSPLANTED HEART: ICD-10-CM

## 2017-09-13 RX ORDER — NITROGLYCERIN 0.4 MG/1
TABLET SUBLINGUAL
Qty: 300 TABLET | Refills: 0 | Status: SHIPPED | OUTPATIENT
Start: 2017-09-13 | End: 2018-01-12

## 2017-09-13 RX ORDER — CLOPIDOGREL BISULFATE 75 MG/1
TABLET ORAL
Qty: 90 TABLET | Refills: 0 | Status: SHIPPED | OUTPATIENT
Start: 2017-09-13 | End: 2017-09-26 | Stop reason: SDUPTHER

## 2017-09-18 RX ORDER — ATORVASTATIN CALCIUM 80 MG/1
TABLET, FILM COATED ORAL
Qty: 90 TABLET | Refills: 0 | Status: SHIPPED | OUTPATIENT
Start: 2017-09-18 | End: 2017-09-26 | Stop reason: SDUPTHER

## 2017-09-19 ENCOUNTER — HOSPITAL ENCOUNTER (EMERGENCY)
Facility: HOSPITAL | Age: 67
Discharge: HOME | End: 2017-09-19
Attending: EMERGENCY MEDICINE
Payer: MEDICARE

## 2017-09-19 VITALS
DIASTOLIC BLOOD PRESSURE: 57 MMHG | TEMPERATURE: 98 F | WEIGHT: 236.31 LBS | HEIGHT: 67 IN | RESPIRATION RATE: 20 BRPM | SYSTOLIC BLOOD PRESSURE: 119 MMHG | BODY MASS INDEX: 37.09 KG/M2 | OXYGEN SATURATION: 98 % | HEART RATE: 62 BPM

## 2017-09-19 DIAGNOSIS — M79.672 LEFT FOOT PAIN: ICD-10-CM

## 2017-09-19 DIAGNOSIS — M10.9 ACUTE GOUT OF LEFT FOOT, UNSPECIFIED CAUSE: Primary | ICD-10-CM

## 2017-09-19 LAB
BASOPHILS # BLD AUTO: 0.06 K/UL
BASOPHILS NFR BLD: 0.7 %
CRP SERPL-MCNC: 0.8 MG/L
DIFFERENTIAL METHOD: ABNORMAL
EOSINOPHIL # BLD AUTO: 0.1 K/UL
EOSINOPHIL NFR BLD: 1.7 %
ERYTHROCYTE [DISTWIDTH] IN BLOOD BY AUTOMATED COUNT: 13 %
HCT VFR BLD AUTO: 39.8 %
HGB BLD-MCNC: 13.8 G/DL
LYMPHOCYTES # BLD AUTO: 2.5 K/UL
LYMPHOCYTES NFR BLD: 30.2 %
MCH RBC QN AUTO: 31.4 PG
MCHC RBC AUTO-ENTMCNC: 34.7 G/DL
MCV RBC AUTO: 91 FL
MONOCYTES # BLD AUTO: 0.6 K/UL
MONOCYTES NFR BLD: 6.8 %
NEUTROPHILS # BLD AUTO: 4.9 K/UL
NEUTROPHILS NFR BLD: 60.1 %
PLATELET # BLD AUTO: 242 K/UL
PMV BLD AUTO: 9.5 FL
RBC # BLD AUTO: 4.4 M/UL
URATE SERPL-MCNC: 12.5 MG/DL
WBC # BLD AUTO: 8.14 K/UL

## 2017-09-19 PROCEDURE — 85025 COMPLETE CBC W/AUTO DIFF WBC: CPT | Mod: NTX

## 2017-09-19 PROCEDURE — 99284 EMERGENCY DEPT VISIT MOD MDM: CPT | Mod: NTX,,, | Performed by: EMERGENCY MEDICINE

## 2017-09-19 PROCEDURE — 86140 C-REACTIVE PROTEIN: CPT | Mod: NTX

## 2017-09-19 PROCEDURE — 99284 EMERGENCY DEPT VISIT MOD MDM: CPT | Mod: 25,NTX

## 2017-09-19 PROCEDURE — 96372 THER/PROPH/DIAG INJ SC/IM: CPT | Mod: NTX

## 2017-09-19 PROCEDURE — 84550 ASSAY OF BLOOD/URIC ACID: CPT | Mod: NTX

## 2017-09-19 PROCEDURE — 25000003 PHARM REV CODE 250: Mod: NTX | Performed by: EMERGENCY MEDICINE

## 2017-09-19 RX ORDER — METOPROLOL SUCCINATE 50 MG/1
TABLET, EXTENDED RELEASE ORAL
Qty: 90 TABLET | Refills: 3 | Status: SHIPPED | OUTPATIENT
Start: 2017-09-19 | End: 2017-09-26 | Stop reason: SDUPTHER

## 2017-09-19 RX ORDER — OXYCODONE AND ACETAMINOPHEN 5; 325 MG/1; MG/1
1 TABLET ORAL
Status: COMPLETED | OUTPATIENT
Start: 2017-09-19 | End: 2017-09-19

## 2017-09-19 RX ORDER — COLCHICINE 0.6 MG/1
0.6 TABLET ORAL DAILY
Qty: 30 TABLET | Refills: 0 | Status: SHIPPED | OUTPATIENT
Start: 2017-09-19 | End: 2017-09-26

## 2017-09-19 RX ORDER — COLCHICINE 0.6 MG/1
1.2 TABLET, FILM COATED ORAL
Status: COMPLETED | OUTPATIENT
Start: 2017-09-19 | End: 2017-09-19

## 2017-09-19 RX ORDER — METOPROLOL SUCCINATE 50 MG/1
TABLET, EXTENDED RELEASE ORAL
Qty: 90 TABLET | Refills: 0 | Status: SHIPPED | OUTPATIENT
Start: 2017-09-19 | End: 2017-09-26 | Stop reason: SDUPTHER

## 2017-09-19 RX ORDER — MORPHINE SULFATE 4 MG/ML
4 INJECTION, SOLUTION INTRAMUSCULAR; INTRAVENOUS
Status: DISCONTINUED | OUTPATIENT
Start: 2017-09-19 | End: 2017-09-19

## 2017-09-19 RX ADMIN — OXYCODONE HYDROCHLORIDE AND ACETAMINOPHEN 1 TABLET: 5; 325 TABLET ORAL at 11:09

## 2017-09-19 RX ADMIN — COLCHICINE 1.2 MG: 0.6 TABLET, FILM COATED ORAL at 12:09

## 2017-09-19 NOTE — ED NOTES
Pt presented to the ED c/o left foot pain. Pt denies injury. Pt states last night his foot started to throb. Pt has mild swelling noted to the top of the left foot.

## 2017-09-19 NOTE — ED PROVIDER NOTES
Encounter Date: 9/19/2017    SCRIBE #1 NOTE: I, Dariel Garcia, am scribing for, and in the presence of,  Dr. Waite. I have scribed the entire note.       History     Chief Complaint   Patient presents with    Foot Pain     L foot pain denies injury but vicodin for my back isn't helping     Time patient was seen by the provider: 10:37 AM      The patient is a 67 y.o. male with hx of: HLD, Myocardial infarction, PE, HTN, DVT,and CAD that presents to the ED with a complaint of left foot pain x3 days. Pain was brought on suddenly, is described as throbbing, and he notes difficulty ambulating and moving toes. Patient denies falling. He denies any history of gout. He takes Vicodin for his chronic back pain which did not improve his symptoms.        The history is provided by the patient and medical records.     Review of patient's allergies indicates:   Allergen Reactions    Iodine containing multivitamin     Keflex [cephalexin]     Peaches [peach (prunus persica)]     Shellfish containing products     Tuberculin spenser test ppd     Fig tree Rash     Past Medical History:   Diagnosis Date    Chronic anticoagulation 5/5/2016    Chronic combined systolic and diastolic heart failure 11/26/2012    EF 10-20% on ECHO 2013    Clotting disorder     Coronary artery disease involving native coronary artery of native heart without angina pectoris 11/26/2012    Cath 10- Stents patent non-obstructive disease Cath 11-12015 non-obstructive disease     Diverticulosis of colon     DVT (deep venous thrombosis), unspecified laterality 11/12/2015    Essential hypertension 11/15/2015    Hyperlipidemia     Hypertensive heart disease with heart failure 5/5/2016    MI (myocardial infarction) 2009    Nicotine abuse     Obesity 11/26/2012    Pulmonary embolism 2011    Stented coronary artery 11/26/2012    LAD stent placed 10/17/2007      Past Surgical History:   Procedure Laterality Date    APPENDECTOMY      BACK  SURGERY      CARDIAC SURGERY      stent    r knee scope      SPINE SURGERY      TONSILLECTOMY       Family History   Problem Relation Age of Onset    Cancer Mother      colon cancer    Heart disease Mother     Diabetes Mother     Heart disease Father     Stroke Father     Colon polyps Father     Cancer Paternal Grandmother      leukemia    No Known Problems Sister     No Known Problems Daughter     No Known Problems Son     No Known Problems Sister     No Known Problems Son      Social History   Substance Use Topics    Smoking status: Former Smoker     Packs/day: 1.00     Years: 45.00     Types: Cigarettes     Quit date: 12/14/2015    Smokeless tobacco: Never Used      Comment: 1-1.5 ppd every day.    Alcohol use No     Review of Systems   Constitutional: Negative for diaphoresis.   HENT: Negative for nosebleeds.    Eyes: Negative for discharge.   Respiratory: Negative for wheezing.    Cardiovascular: Negative for chest pain.   Gastrointestinal: Negative for vomiting.   Genitourinary: Negative for discharge.   Musculoskeletal: Negative for neck pain and neck stiffness.        + left foot pain   Skin: Negative for wound.   Neurological: Negative for speech difficulty.       Physical Exam     Initial Vitals [09/19/17 0918]   BP Pulse Resp Temp SpO2   135/63 70 18 97.9 °F (36.6 °C) 97 %      MAP       87         Physical Exam    Nursing note and vitals reviewed.  Constitutional: He appears well-nourished. No distress.   Disheveled.   HENT:   Head: Normocephalic and atraumatic.   Mouth/Throat: Oropharynx is clear and moist.   Eyes: Conjunctivae are normal.   Neck: Normal range of motion.   Cardiovascular: Normal rate, regular rhythm and normal heart sounds.   Pulmonary/Chest: Breath sounds normal. No respiratory distress.   Abdominal: Soft. He exhibits no distension. There is no tenderness.   Musculoskeletal: Normal range of motion.   Dorsum of the left foot: 2x2 area of erythema and tenderness . No  wounds. No deformities. Good pulse, good capillary refill.   Neurological: He is alert and oriented to person, place, and time.   Skin: Skin is warm and dry.         ED Course   Procedures  Labs Reviewed   URIC ACID - Abnormal; Notable for the following:        Result Value    Uric Acid 12.5 (*)     All other components within normal limits    Narrative:     shared one lav   CBC W/ AUTO DIFFERENTIAL - Abnormal; Notable for the following:     RBC 4.40 (*)     Hemoglobin 13.8 (*)     Hematocrit 39.8 (*)     MCH 31.4 (*)     All other components within normal limits   C-REACTIVE PROTEIN    Narrative:     shared one lav          X-Rays:   Independently Interpreted Readings:   Other Readings:  No acute process.    Medical Decision Making:   History:   Old Medical Records: I decided to obtain old medical records.  Initial Assessment:   Evaluation of atraumatic foot pain. Initial concerns include infection, gout, chronic pain. Will check inflammatory markers and xray the foot. Will reassess.  Independently Interpreted Test(s):   I have ordered and independently interpreted X-rays - see prior notes.  Clinical Tests:   Lab Tests: Ordered and Reviewed  Radiological Study: Ordered and Reviewed            Scribe Attestation:   Scribe #1: I performed the above scribed service and the documentation accurately describes the services I performed. I attest to the accuracy of the note.    Attending Attestation:           Physician Attestation for Scribe:  Physician Attestation Statement for Scribe #1: I, Dr. Waite, reviewed documentation, as scribed by Dariel Garcia in my presence, and it is both accurate and complete.         Attending ED Notes:   12:34 PM Lab work demonstrated an elevated uric acid of 12.5, normal CRP, and normal WBC count. He has a history of CKD. Will avoid NSAIDs and treat with colchicine . Patient can continue home narcotic medication as needed.          ED Course      Clinical Impression:   The primary  encounter diagnosis was Acute gout of left foot, unspecified cause. A diagnosis of Left foot pain was also pertinent to this visit.    Disposition:   Disposition: Discharged  Condition: Stable                        Jonah Waite MD  09/19/17 7909

## 2017-09-19 NOTE — ED NOTES
Pt identifiers Audrey Oneil Jr. were checked and correct  LOC: The patient is awake, alert, aware of environment with an appropriate affect. Oriented x3, speaking appropriately  APPEARANCE: Pt resting comfortably, in no acute distress, pt is clean and well groomed, clothing properly fastened  SKIN: Skin warm, dry and intact, normal skin turgor, moist mucus membranes  RESPIRATORY: Airway is open and patent, respirations are spontaneous, even and unlabored, normal effort and rate  CARDIAC: Normal rate and rhythm, no peripheral edema noted, capillary refill < 3 seconds, bilateral radial pulses 2+  ABDOMEN: Soft, non tender, non distended. Bowel sounds present x 4 quadrants.   NEUROLOGIC: PERRLA, facial expression is symmetrical, patient moving all extremities spontaneously, normal sensation in all extremities when touched with a finger.  Follows all commands appropriately  MUSCULOSKELETAL: No obvious deformities. Pt c/o left foot pain. Pt denies injury.

## 2017-09-22 ENCOUNTER — TELEPHONE (OUTPATIENT)
Dept: TRANSPLANT | Facility: CLINIC | Age: 67
End: 2017-09-22

## 2017-09-22 DIAGNOSIS — Z76.82 ORGAN TRANSPLANT CANDIDATE: Primary | ICD-10-CM

## 2017-09-22 NOTE — TELEPHONE ENCOUNTER
Returned Mr. Oneil's call at the provided number. There was no answer. Voice mail was an option and message left.

## 2017-09-22 NOTE — TELEPHONE ENCOUNTER
----- Message from Estephanie Gaxiola sent at 9/22/2017  1:14 PM CDT -----  Contact: pt   Pt called because he was having pain in his wrist and in the top of his foot and went to the Er.  They diagnosed him with gout.  He has to take the gout meds and wants to make sure it is ok for him to take the meds and also he wants to have it checked again to make sure it is not a clot because he has had them before.  He can be reached @ [phone].  Thanks!!

## 2017-09-26 ENCOUNTER — OFFICE VISIT (OUTPATIENT)
Dept: TRANSPLANT | Facility: CLINIC | Age: 67
End: 2017-09-26
Payer: MEDICARE

## 2017-09-26 ENCOUNTER — CLINICAL SUPPORT (OUTPATIENT)
Dept: CARDIOLOGY | Facility: CLINIC | Age: 67
End: 2017-09-26
Payer: MEDICARE

## 2017-09-26 VITALS
HEIGHT: 67 IN | HEART RATE: 60 BPM | BODY MASS INDEX: 36.34 KG/M2 | WEIGHT: 231.5 LBS | SYSTOLIC BLOOD PRESSURE: 83 MMHG | DIASTOLIC BLOOD PRESSURE: 55 MMHG

## 2017-09-26 DIAGNOSIS — I10 ESSENTIAL HYPERTENSION: ICD-10-CM

## 2017-09-26 DIAGNOSIS — Z76.82 ORGAN TRANSPLANT CANDIDATE: ICD-10-CM

## 2017-09-26 DIAGNOSIS — I50.42 CHRONIC COMBINED SYSTOLIC AND DIASTOLIC HEART FAILURE: Primary | Chronic | ICD-10-CM

## 2017-09-26 DIAGNOSIS — N18.30 CKD (CHRONIC KIDNEY DISEASE), STAGE III: ICD-10-CM

## 2017-09-26 DIAGNOSIS — I25.5 CARDIOMYOPATHY, ISCHEMIC: Chronic | ICD-10-CM

## 2017-09-26 DIAGNOSIS — I11.0 HYPERTENSIVE HEART DISEASE WITH HEART FAILURE: ICD-10-CM

## 2017-09-26 DIAGNOSIS — I25.10 CORONARY ARTERY DISEASE INVOLVING NATIVE CORONARY ARTERY OF NATIVE HEART WITHOUT ANGINA PECTORIS: ICD-10-CM

## 2017-09-26 LAB — VASCULAR ANKLE BRACHIAL INDEX (ABI) RIGHT: 0.92 (ref 0.9–1.2)

## 2017-09-26 PROCEDURE — 93970 EXTREMITY STUDY: CPT | Mod: NTX,S$GLB,, | Performed by: INTERNAL MEDICINE

## 2017-09-26 PROCEDURE — 99499 UNLISTED E&M SERVICE: CPT | Mod: S$GLB,TXP,, | Performed by: INTERNAL MEDICINE

## 2017-09-26 PROCEDURE — 93922 UPR/L XTREMITY ART 2 LEVELS: CPT | Mod: NTX,S$GLB,, | Performed by: INTERNAL MEDICINE

## 2017-09-26 PROCEDURE — 3078F DIAST BP <80 MM HG: CPT | Mod: S$GLB,TXP,, | Performed by: INTERNAL MEDICINE

## 2017-09-26 PROCEDURE — 99999 PR PBB SHADOW E&M-EST. PATIENT-LVL III: CPT | Mod: PBBFAC,TXP,, | Performed by: INTERNAL MEDICINE

## 2017-09-26 PROCEDURE — 3008F BODY MASS INDEX DOCD: CPT | Mod: S$GLB,TXP,, | Performed by: INTERNAL MEDICINE

## 2017-09-26 PROCEDURE — 3074F SYST BP LT 130 MM HG: CPT | Mod: S$GLB,TXP,, | Performed by: INTERNAL MEDICINE

## 2017-09-26 PROCEDURE — 99214 OFFICE O/P EST MOD 30 MIN: CPT | Mod: S$GLB,TXP,, | Performed by: INTERNAL MEDICINE

## 2017-09-26 NOTE — LETTER
September 26, 2017        Stephanie Jade             [facility]  [address]  [facility] [address]  Phone: [phone]   Patient: Audrey Oneil Jr.   MR Number: [id number]   YOB: 1950   Date of Visit: 9/26/2017       Dear Dr. Stephanie Jade    Thank you for referring Audrey Oneil to me for evaluation. Attached you will find relevant portions of my assessment and plan of care.    If you have questions, please do not hesitate to call me. I look forward to following Audrey Oneil along with you.    Sincerely,    Kevin Christianson MD    Enclosure    If you would like to receive this communication electronically, please contact [email or web address] or [phone] to request Endom[employer] Link access.    [employer] Link is a tool which provides read-only access to select patient information with whom you have a relationship. Its easy to use and provides real time access to review your patients record including encounter summaries, notes, results, and demographic information.    If you feel you have received this communication in error or would no longer like to receive these types of communications, please e-mail [email or web address]

## 2017-09-26 NOTE — PROGRESS NOTES
Subjective:   Transplant status: active    HPI:  Mr. Oneil is a very pleasant 67 y.o. male with a hx of CAD s/p STEMI (s/p PCI LAD 2007), CHF/ICM and remote MI, quit smoking Dec 2015,  PE (2010, s/p 1-yr coumadin), HTN, DLP and s/p ICD [facility] placement due to VT who comes for a regular follow-up. He was last seen by Dr. Marshall 6/12/17 and had RHC 7/10/17 (see below). He has been declining over the last several months and most recent CPX (2/15/17) that showed low Peak VO2 of 7.5ml/kg/min but AT was not attained and RER was only 0.67 (poor effort). Most recent 2D echo showed severely depressed LV function with an EF=10-15%, LV with a LVEDD of 5.9  cm, normal RV size and function. Clinically reports NYHA class II-III symptoms.  RHC done showed normal left and right sided filling presssures. Low normal CO/CI.     Today, he reports doing well. NYHA Class II-III still as he is participating well with rehab. He has lost 5-6lbs over last 3 months and is trying to eat right and exercise. No HF readmissions and no cardiopulmonary complaints tdoay. He had a recent ER visit for gout flare but pain has resolved.     RHC 7/2017:  RA: 5/4 (1)  RV: 31/-6  PW:  (6)  PA: 32/2 (15)  AO: 120/64 (91)  PA_SAT: 64    CONDITION 1:  FICKCI: 2.1400  FICKCO: 4.6600    Past Medical History:   Diagnosis Date    Chronic anticoagulation 5/5/2016    Chronic combined systolic and diastolic heart failure 11/26/2012    EF 10-20% on ECHO 2013    Clotting disorder     Coronary artery disease involving native coronary artery of native heart without angina pectoris 11/26/2012    Cath 10- Stents patent non-obstructive disease Cath 11-12015 non-obstructive disease     Diverticulosis of colon     DVT (deep venous thrombosis), unspecified laterality 11/12/2015    Essential hypertension 11/15/2015    Hyperlipidemia     Hypertensive heart disease with heart failure 5/5/2016    MI (myocardial infarction) 2009    Nicotine abuse     Obesity  "11/26/2012    Pulmonary embolism 2011    Stented coronary artery 11/26/2012    LAD stent placed 10/17/2007      Past Surgical History:   Procedure Laterality Date    APPENDECTOMY      BACK SURGERY      CARDIAC SURGERY      stent    r knee scope      SPINE SURGERY      TONSILLECTOMY         Review of Systems   Constitution: Negative. Negative for chills, decreased appetite, diaphoresis, fever, weakness, malaise/fatigue, night sweats, weight gain and weight loss.   Eyes: Negative.    Cardiovascular: Positive for dyspnea on exertion. Negative for chest pain, claudication, cyanosis, irregular heartbeat, leg swelling, near-syncope, orthopnea, palpitations, paroxysmal nocturnal dyspnea and syncope.   Respiratory: Negative for cough, hemoptysis and shortness of breath.    Endocrine: Negative.    Hematologic/Lymphatic: Negative.    Skin: Negative for color change, dry skin and nail changes.   Musculoskeletal: Negative.    Gastrointestinal: Negative.    Genitourinary: Negative.        Objective:   Blood pressure (!) 83/55, pulse 60, height 5' 7" (1.702 m), weight 105 kg (231 lb 7.7 oz).body mass index is 36.26 kg/m². Manual BP: 88-90/58  Physical Exam   Constitutional: He appears well-developed.   HENT:   Head: Normocephalic.   Neck: No JVD present. Carotid bruit is not present.   Cardiovascular: Regular rhythm, normal heart sounds and normal pulses.  PMI is displaced.    No murmur heard.  Pulmonary/Chest: Effort normal and breath sounds normal. No respiratory distress. He has no wheezes. He has no rales.   Abdominal: Soft. Bowel sounds are normal. There is no tenderness. There is no rebound.   +protuberant   Musculoskeletal: He exhibits no edema.   Neurological: He is alert.   Skin: Skin is warm.   Vitals reviewed.      Labs:    Chemistry        Component Value Date/Time     08/11/2017 0401    K 4.1 08/11/2017 0401     08/11/2017 0401    CO2 22 (L) 08/11/2017 0401    BUN 40 (H) 08/11/2017 0401    " CREATININE 1.7 (H) 08/11/2017 0401     08/11/2017 0401        Component Value Date/Time    CALCIUM 8.6 (L) 08/11/2017 0401    ALKPHOS 96 08/11/2017 0401    AST 21 08/11/2017 0401    ALT 31 08/11/2017 0401    BILITOT 0.3 08/11/2017 0401    ESTGFRAFRICA 47.9 (A) 08/11/2017 0401    EGFRNONAA 41.4 (A) 08/11/2017 0401          Magnesium   Date Value Ref Range Status   08/11/2017 2.2 1.6 - 2.6 mg/dL Final     Lab Results   Component Value Date    WBC 8.14 09/19/2017    HGB 13.8 (L) 09/19/2017    HCT 39.8 (L) 09/19/2017     09/19/2017     Lab Results   Component Value Date    INR 0.9 08/11/2017    INR 0.9 07/10/2017    INR 0.9 12/08/2016     BNP   Date Value Ref Range Status   09/12/2017 64 0 - 99 pg/mL Final     Comment:     Values of less than 100 pg/ml are consistent with non-CHF populations.   06/12/2017 57 0 - 99 pg/mL Final     Comment:     Values of less than 100 pg/ml are consistent with non-CHF populations.   04/14/2017 81 0 - 99 pg/mL Final     Comment:     Values of less than 100 pg/ml are consistent with non-CHF populations.     No results found for: LDH  No results found for this or any previous visit.  No results found for this or any previous visit.    Assessment:      1. Chronic combined systolic and diastolic heart failure    2. Cardiomyopathy, ischemic    3. Coronary artery disease involving native coronary artery of native heart without angina pectoris    4. Essential hypertension    5. Hypertensive heart disease with heart failure    6. CKD (chronic kidney disease), stage III        Plan:   67 y.o. male with ICMP, HFrEF stage D with NYHA class II-III symptoms with normal left and right sided filling pressures by recent RHC. Low normal CI.  Continue current HF regimen; BP and HR preclude up-titration    Recommend 2 gram sodium restriction and 1500cc fluid restriction.  Encourage physical activity with graded exercise program.  Requested patient to weigh themselves daily, and to notify us if  their weight increases by more than 3 lbs in 1 day or 5 lbs in 1 week.   RTC in 3 months with labs     ZHENG Christianson MD  Transplant Cardiology

## 2017-10-04 ENCOUNTER — CLINICAL SUPPORT (OUTPATIENT)
Dept: ELECTROPHYSIOLOGY | Facility: CLINIC | Age: 67
End: 2017-10-04
Payer: MEDICARE

## 2017-10-04 DIAGNOSIS — I42.9 CARDIOMYOPATHY: ICD-10-CM

## 2017-10-04 DIAGNOSIS — Z95.810 IMPLANTABLE CARDIOVERTER-DEFIBRILLATOR (ICD) IN SITU: ICD-10-CM

## 2017-10-04 PROCEDURE — 93295 DEV INTERROG REMOTE 1/2/MLT: CPT | Mod: NTX,S$GLB,, | Performed by: INTERNAL MEDICINE

## 2017-10-04 PROCEDURE — 93296 REM INTERROG EVL PM/IDS: CPT | Mod: NTX,S$GLB,, | Performed by: INTERNAL MEDICINE

## 2017-10-08 ENCOUNTER — HOSPITAL ENCOUNTER (EMERGENCY)
Facility: HOSPITAL | Age: 67
Discharge: HOME | End: 2017-10-08
Attending: EMERGENCY MEDICINE
Payer: MEDICARE

## 2017-10-08 VITALS
HEIGHT: 67 IN | SYSTOLIC BLOOD PRESSURE: 99 MMHG | TEMPERATURE: 98 F | HEART RATE: 66 BPM | BODY MASS INDEX: 36.26 KG/M2 | RESPIRATION RATE: 16 BRPM | DIASTOLIC BLOOD PRESSURE: 58 MMHG | WEIGHT: 231 LBS | OXYGEN SATURATION: 96 %

## 2017-10-08 DIAGNOSIS — K12.0 CANKER SORE: Primary | ICD-10-CM

## 2017-10-08 PROCEDURE — 99283 EMERGENCY DEPT VISIT LOW MDM: CPT | Mod: NTX

## 2017-10-08 PROCEDURE — 99283 EMERGENCY DEPT VISIT LOW MDM: CPT | Mod: NTX,,, | Performed by: PHYSICIAN ASSISTANT

## 2017-10-08 RX ORDER — TRIAMCINOLONE ACETONIDE 1 MG/G
OINTMENT TOPICAL 2 TIMES DAILY
Qty: 15 G | Refills: 0 | Status: SHIPPED | OUTPATIENT
Start: 2017-10-08 | End: 2017-10-08 | Stop reason: CLARIF

## 2017-10-08 NOTE — ED TRIAGE NOTES
Abscessed tooth right lower gum line for 2 weeks.  Patient has blisters under his tongue.    GENERAL: The patient is well-developed and well-nourished in no apparent distress. Alert and oriented x4.                                                HEENT: Head is normocephalic and atraumatic. Extraocular muscles are intact. Pupils are equal, round, and reactive to light and accommodation. Nares appeared normal. Mouth is well hydrated and without lesions. Mucous membranes are moist. Posterior pharynx clear of any exudate or lesions.    NECK: Supple. No carotid bruits. No lymphadenopathy or thyromegaly.    LUNGS: Clear to auscultation.    HEART: Regular rate and rhythm without murmur.     ABDOMEN: Soft, nontender, and nondistended. Positive bowel sounds. No hepatosplenomegaly was noted.     EXTREMITIES: Without any cyanosis, clubbing, rash, lesions or edema.     NEUROLOGIC: Cranial nerves II through XII are grossly intact.     PSYCHIATRIC: Flat affect, but denies suicidal or homicidal ideations.    SKIN: No ulceration or induration present.

## 2017-10-08 NOTE — ED PROVIDER NOTES
Encounter Date: 10/8/2017    SCRIBE #1 NOTE: I, Caroline Mackey, am scribing for, and in the presence of,  Dr. Robles. I have scribed the following portions of the note - the APC attestation.       History     Chief Complaint   Patient presents with    Dental Pain     Pt presented to the ED c/o dental pain x 1 week. Pt states he has blisters in his mouth as well.      Patient is a 67-year-old male with past medical history of CHF, nicotine abuse, hyperlipidemia, coronary artery disease, pulmonary embolism, DVT, and dental carriers who presents to the emergency department due to a 2 week history of dental pain.  Patient states that he has to dental carriers on the right bottom gum that are to be removed.  Patient states that his tongue has been rubbing over the bottom teeth that are broken and has been scratching the bottom of his tongue causing canker ulcers.  Patient states that he has used mouthwash with no relief.  Patient states that he is having difficulty chewing his food and swallowing due to pain.  Patient states that he is trying to follow-up with the dentist however is unable to do showed due to insurance.  Patient denies any nausea, vomiting, fevers, chills, chest pain, shortness breath, or any other complaints.          Review of patient's allergies indicates:   Allergen Reactions    Iodine containing multivitamin     Keflex [cephalexin]     Peaches [peach (prunus persica)]     Shellfish containing products     Tuberculin spenser test ppd     Fig tree Rash     Past Medical History:   Diagnosis Date    Chronic anticoagulation 5/5/2016    Chronic combined systolic and diastolic heart failure 11/26/2012    EF 10-20% on ECHO 2013    Clotting disorder     Coronary artery disease involving native coronary artery of native heart without angina pectoris 11/26/2012    Cath 10- Stents patent non-obstructive disease Cath 11-12015 non-obstructive disease     Diverticulosis of colon     DVT (deep venous  thrombosis), unspecified laterality 11/12/2015    Essential hypertension 11/15/2015    Hyperlipidemia     Hypertensive heart disease with heart failure 5/5/2016    MI (myocardial infarction) 2009    Nicotine abuse     Obesity 11/26/2012    Pulmonary embolism 2011    Stented coronary artery 11/26/2012    LAD stent placed 10/17/2007      Past Surgical History:   Procedure Laterality Date    APPENDECTOMY      BACK SURGERY      CARDIAC SURGERY      stent    r knee scope      SPINE SURGERY      TONSILLECTOMY       Family History   Problem Relation Age of Onset    Cancer Mother      colon cancer    Heart disease Mother     Diabetes Mother     Heart disease Father     Stroke Father     Colon polyps Father     Cancer Paternal Grandmother      leukemia    No Known Problems Sister     No Known Problems Daughter     No Known Problems Son     No Known Problems Sister     No Known Problems Son      Social History   Substance Use Topics    Smoking status: Former Smoker     Packs/day: 1.00     Years: 45.00     Types: Cigarettes     Quit date: 12/14/2015    Smokeless tobacco: Never Used      Comment: 1-1.5 ppd every day.    Alcohol use No     Review of Systems   Constitutional: Negative for activity change, appetite change, chills, fatigue and fever.   HENT: Positive for dental problem and mouth sores. Negative for congestion, drooling, ear discharge, ear pain, facial swelling, hearing loss, sore throat and trouble swallowing.    Eyes: Negative for photophobia, pain and discharge.   Respiratory: Negative for apnea, cough, chest tightness, shortness of breath and wheezing.    Cardiovascular: Negative for chest pain and leg swelling.   Gastrointestinal: Negative for abdominal distention, abdominal pain, blood in stool, constipation, diarrhea, nausea and vomiting.   Endocrine: Negative for cold intolerance, polydipsia and polyuria.   Genitourinary: Negative for decreased urine volume, difficulty urinating,  enuresis, frequency and hematuria.   Musculoskeletal: Negative for arthralgias, gait problem, myalgias and neck stiffness.   Skin: Negative for color change, rash and wound.   Allergic/Immunologic: Negative for environmental allergies.   Neurological: Negative for dizziness, tremors, syncope, weakness, light-headedness, numbness and headaches.   Hematological: Negative for adenopathy.   Psychiatric/Behavioral: Negative for agitation.       Physical Exam     Initial Vitals [10/08/17 1106]   BP Pulse Resp Temp SpO2   (!) 99/58 66 16 98.2 °F (36.8 °C) 96 %      MAP       71.67         Physical Exam    Nursing note and vitals reviewed.  Constitutional: He appears well-developed and well-nourished. He is not diaphoretic. No distress.   HENT:   Head: Normocephalic and atraumatic.   Mouth/Throat: Dental caries present.   Neck: Normal range of motion. Neck supple.   Cardiovascular: Normal rate, regular rhythm and normal heart sounds. Exam reveals no gallop and no friction rub.    No murmur heard.  Pulmonary/Chest: Breath sounds normal. He has no wheezes. He has no rhonchi. He has no rales.   Abdominal: Soft. Bowel sounds are normal. There is no tenderness. There is no rebound and no guarding.   Musculoskeletal: Normal range of motion.   Neurological: He is alert and oriented to person, place, and time.   Skin: Skin is warm and dry. No rash noted. No erythema.   Psychiatric: He has a normal mood and affect.         ED Course   Procedures  Labs Reviewed - No data to display          Medical Decision Making:   History:   Old Medical Records: I decided to obtain old medical records.       APC / Resident Notes:   Patient is a 67-year-old male with past medical history of CHF, nicotine abuse, hyperlipidemia, coronary artery disease, pulmonary embolism, DVT, and dental carriers who presents to the emergency department due to a 2 week history of dental pain.  Physical exam reveals male in no acute distress.  Heart regular rate and  rhythm.  Lungs clear to auscultation bilaterally.  Abdomen soft nontender nondistended.  Mouth with to dental carriers on bottom right gum.  Canker sores noted under her tongue.    Patient was to be discharged home in stable condition with a prescription for Magic mouthwash and told to take over-the-counter Orajel.  Patient eloped before getting his prescription and further discharge instructions.  When of treatment discussed with attending physician and she is agreeable.       Scribe Attestation:   Scribe #1: I performed the above scribed service and the documentation accurately describes the services I performed. I attest to the accuracy of the note.  Comments: I, Dr. Shannon Robles, personally performed the services described in this documentation. All medical record entries made by the scribe were at my direction and in my presence.  I have reviewed the chart and agree that the record reflects my personal performance and is accurate and complete. Shannon Robles MD.  1:46 PM 10/17/2017      Attending Attestation:     Physician Attestation Statement for NP/PA:   I discussed this assessment and plan of this patient with the NP/PA, but I did not personally examine the patient. The face to face encounter was performed by the NP/PA.    Other NP/PA Attestation Additions:    History of Present Illness: Pt with dental pain.                    ED Course      Clinical Impression:   The encounter diagnosis was Canker sore.    Disposition:   Disposition: Eloped  Condition: Stable                        Cindy Phillips PA-C  10/08/17 2012       Shannon Robles MD  10/17/17 1483

## 2017-10-11 ENCOUNTER — HOSPITAL ENCOUNTER (EMERGENCY)
Facility: HOSPITAL | Age: 67
Discharge: HOME | End: 2017-10-11
Attending: EMERGENCY MEDICINE
Payer: MEDICARE

## 2017-10-11 VITALS
TEMPERATURE: 98 F | OXYGEN SATURATION: 96 % | HEIGHT: 67 IN | HEART RATE: 60 BPM | RESPIRATION RATE: 18 BRPM | SYSTOLIC BLOOD PRESSURE: 104 MMHG | WEIGHT: 238.13 LBS | BODY MASS INDEX: 37.37 KG/M2 | DIASTOLIC BLOOD PRESSURE: 64 MMHG
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
## 2017-10-11 DIAGNOSIS — M25.511 RIGHT SHOULDER PAIN: ICD-10-CM
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
## 2017-10-11 PROCEDURE — 99284 EMERGENCY DEPT VISIT MOD MDM: CPT | Mod: NTX,,, | Performed by: PHYSICIAN ASSISTANT

## 2017-10-11 PROCEDURE — 99284 EMERGENCY DEPT VISIT MOD MDM: CPT | Mod: NTX

## 2017-10-11 RX ORDER — TRAMADOL HYDROCHLORIDE 50 MG/1
50 TABLET ORAL EVERY 6 HOURS PRN
Qty: 10 TABLET | Refills: 0 | Status: SHIPPED | OUTPATIENT
Start: 2017-10-11 | End: 2017-10-21

## 2017-10-11 NOTE — ED PROVIDER NOTES
Encounter Date: 10/11/2017       History     Chief Complaint   Patient presents with    Fall     c/o mechanical fall tonight, landed on face, left ring finger, and right arm. denies LOC. c/o left facial pain, lip swelling, left ring finger swelling, and right arm pain. denies dizziness or lightheadedness prior to fall.      67-year-old male presents to the ER for evaluation of trauma following a fall.  This is a mechanical fall.  Patient injured his face, left hand, right shoulder.  He denies any loss of consciousness or syncope. He does take Plavix.          Review of patient's allergies indicates:   Allergen Reactions    Iodine containing multivitamin     Keflex [cephalexin]     Peaches [peach (prunus persica)]     Shellfish containing products     Tuberculin spenser test ppd     Fig tree Rash     Past Medical History:   Diagnosis Date    Chronic anticoagulation 5/5/2016    Chronic combined systolic and diastolic heart failure 11/26/2012    EF 10-20% on ECHO 2013    Clotting disorder     Coronary artery disease involving native coronary artery of native heart without angina pectoris 11/26/2012    Cath 10- Stents patent non-obstructive disease Cath 11-12015 non-obstructive disease     Diverticulosis of colon     DVT (deep venous thrombosis), unspecified laterality 11/12/2015    Essential hypertension 11/15/2015    Hyperlipidemia     Hypertensive heart disease with heart failure 5/5/2016    MI (myocardial infarction) 2009    Nicotine abuse     Obesity 11/26/2012    Pulmonary embolism 2011    Stented coronary artery 11/26/2012    LAD stent placed 10/17/2007      Past Surgical History:   Procedure Laterality Date    APPENDECTOMY      BACK SURGERY      CARDIAC SURGERY      stent    r knee scope      SPINE SURGERY      TONSILLECTOMY       Family History   Problem Relation Age of Onset    Cancer Mother      colon cancer    Heart disease Mother     Diabetes Mother     Heart disease  Father     Stroke Father     Colon polyps Father     Cancer Paternal Grandmother      leukemia    No Known Problems Sister     No Known Problems Daughter     No Known Problems Son     No Known Problems Sister     No Known Problems Son      Social History   Substance Use Topics    Smoking status: Former Smoker     Packs/day: 1.00     Years: 45.00     Types: Cigarettes     Quit date: 12/14/2015    Smokeless tobacco: Never Used      Comment: 1-1.5 ppd every day.    Alcohol use No     Review of Systems   Constitutional: Negative for fever.   HENT: Negative for sore throat.         Normal exam.   No acute trauma identified   Respiratory: Negative for shortness of breath.    Cardiovascular: Negative for chest pain.   Gastrointestinal: Negative for nausea.   Genitourinary: Negative for dysuria.   Musculoskeletal: Positive for arthralgias, neck pain and neck stiffness. Negative for back pain.   Skin: Negative for rash.   Neurological: Negative for weakness.   Hematological: Does not bruise/bleed easily.       Physical Exam     Initial Vitals [10/11/17 0427]   BP Pulse Resp Temp SpO2   (!) 104/57 64 18 98.1 °F (36.7 °C) 97 %      MAP       72.67         Physical Exam    Constitutional: Vital signs are normal. He appears well-developed and well-nourished.   HENT:   Head: Normocephalic and atraumatic.   Normal exam  No obvious signs of trauma   Eyes: Conjunctivae are normal.   Cardiovascular: Normal rate and regular rhythm.   Abdominal: Soft. Normal appearance, normal aorta and bowel sounds are normal.   Musculoskeletal: Normal range of motion.   Tenderness to the R shoulder  On palpation.   ROM good  Mild swelling to the L ring finger.      Neurological: He is alert and oriented to person, place, and time.   Nonfocal   Skin: Skin is warm and intact.   Psychiatric: He has a normal mood and affect. His speech is normal and behavior is normal. Cognition and memory are normal.         ED Course   Procedures  Labs  Reviewed - No data to display          Medical Decision Making:   ED Management:  67-year-old male with trauma after a mechanical fall.   Imaging in the ED reveals no acute abnormal findings.   Patient will be discharged home with analgesic medication and follow-up instructions to see his PCP.  Return instructions given.  Patient stable.                   ED Course      Clinical Impression:   The primary encounter diagnosis was Fall, initial encounter. A diagnosis of Right shoulder pain was also pertinent to this visit.    Disposition:   Disposition: Discharged  Condition: Stable                        Gilbert Streeter PA-C  10/11/17 0600

## 2017-10-11 NOTE — DISCHARGE INSTRUCTIONS
Follow up with her primary care provider  Return to the emergency department for any new symptoms  Take Tramadol as need for pain  Take with caution as it may cause sleepiness and drowsiness

## 2017-10-11 NOTE — ED TRIAGE NOTES
c/o mechanical fall tonight, landed on face, left ring finger, and right arm. denies LOC. c/o left facial pain, lip swelling, left ring finger swelling, and right arm pain. denies dizziness or lightheadedness prior to fall.

## 2017-10-12 ENCOUNTER — IMMUNIZATION (OUTPATIENT)
Dept: INTERNAL MEDICINE | Facility: CLINIC | Age: 67
End: 2017-10-12
Payer: MEDICARE

## 2017-10-12 ENCOUNTER — OFFICE VISIT (OUTPATIENT)
Dept: INTERNAL MEDICINE | Facility: CLINIC | Age: 67
End: 2017-10-12
Payer: MEDICARE

## 2017-10-12 VITALS
WEIGHT: 237.88 LBS | BODY MASS INDEX: 37.34 KG/M2 | HEIGHT: 67 IN | OXYGEN SATURATION: 97 % | HEART RATE: 69 BPM | DIASTOLIC BLOOD PRESSURE: 58 MMHG | SYSTOLIC BLOOD PRESSURE: 84 MMHG

## 2017-10-12 DIAGNOSIS — D64.9 ANEMIA, UNSPECIFIED TYPE: ICD-10-CM

## 2017-10-12 DIAGNOSIS — I25.10 CORONARY ARTERY DISEASE INVOLVING NATIVE CORONARY ARTERY OF NATIVE HEART WITHOUT ANGINA PECTORIS: ICD-10-CM

## 2017-10-12 DIAGNOSIS — I50.42 CHRONIC COMBINED SYSTOLIC AND DIASTOLIC HEART FAILURE: Primary | Chronic | ICD-10-CM

## 2017-10-12 DIAGNOSIS — I10 ESSENTIAL HYPERTENSION: ICD-10-CM

## 2017-10-12 DIAGNOSIS — I11.0 HYPERTENSIVE HEART DISEASE WITH HEART FAILURE: ICD-10-CM

## 2017-10-12 DIAGNOSIS — M1A.0720 IDIOPATHIC CHRONIC GOUT OF LEFT FOOT WITHOUT TOPHUS: ICD-10-CM
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
## 2017-10-12 DIAGNOSIS — N18.30 CKD (CHRONIC KIDNEY DISEASE), STAGE III: ICD-10-CM

## 2017-10-12 DIAGNOSIS — E78.5 HYPERLIPIDEMIA LDL GOAL <70: ICD-10-CM

## 2017-10-12 PROBLEM — M1A.0790 CHRONIC GOUT OF FOOT: Status: ACTIVE | Noted: 2017-10-12

## 2017-10-12 PROCEDURE — 99499 UNLISTED E&M SERVICE: CPT | Mod: S$PBB,,, | Performed by: INTERNAL MEDICINE

## 2017-10-12 PROCEDURE — 99214 OFFICE O/P EST MOD 30 MIN: CPT | Mod: S$GLB,,, | Performed by: INTERNAL MEDICINE

## 2017-10-12 PROCEDURE — 99999 PR PBB SHADOW E&M-EST. PATIENT-LVL III: CPT | Mod: PBBFAC,,, | Performed by: INTERNAL MEDICINE

## 2017-10-12 PROCEDURE — 90662 IIV NO PRSV INCREASED AG IM: CPT | Mod: S$GLB,,, | Performed by: INTERNAL MEDICINE

## 2017-10-12 PROCEDURE — G0008 ADMIN INFLUENZA VIRUS VAC: HCPCS | Mod: S$GLB,,, | Performed by: INTERNAL MEDICINE

## 2017-10-12 RX ORDER — HYDROCODONE BITARTRATE AND ACETAMINOPHEN 10; 325 MG/1; MG/1
TABLET ORAL
Refills: 0 | COMMUNITY
Start: 2017-09-27 | End: 2018-07-29 | Stop reason: ALTCHOICE

## 2017-10-12 RX ORDER — AMOXICILLIN AND CLAVULANATE POTASSIUM 875; 125 MG/1; MG/1
TABLET, FILM COATED ORAL
Refills: 0 | COMMUNITY
Start: 2017-10-02 | End: 2018-01-12

## 2017-10-12 NOTE — PROGRESS NOTES
INTERNAL MEDICINE ESTABLISHED PATIENT VISIT NOTE    Subjective:     Chief Complaint: Follow-up  ER visit p fall at home, f/u HTN     Patient ID: Audrey Oneil Jr. is a 67 y.o. male with PMHx ofCAD c HF (hx STEMI s/p PCI LAD 2007, most recent echo c sys and diastolic dysfunction c EF 10-15%, s/p AICD c hx VT, recent RHC c normal R and L sided filling pressures, low cardiac output/index, normal pulm pressures) followed by Dr. Marshall/Ricky, HTN, CKD3, obesity c BMI 37, HLD, hx DVT and PE in 2007 (prev completed course of Coumadinn x 1 year per Cards note), GERD, chronic low back pain, anemia, here for 3 mo f/u.    Since last appt, has had several ER visits including in Aug for fatigue and dizziness, was suspected to be due to med s/e, basic w/u in ED benign and pt discharged.    Went again in early Sept for wrist lac that has since resolved.  Then again in late Sept for L foot pain c presumed gout.  Again in early Oct for dental pain and oral ulcers.    Was seen by Ricky in late Sept who rec therapy and an exercise program which pt delayed due to issues c gout.    Went again to ED yesterday p a fall at home.  Was going to the bathroom and slipped on the towels on the floor, fell and hit his face, hurt his R shoulder, arm, and R knee and injured his L ring finger which started swelling today.    Ct head and xrays of his hand and shoulder in ED unremarkable.    Past Medical History:   Diagnosis Date    Chronic anticoagulation 5/5/2016    Chronic combined systolic and diastolic heart failure 11/26/2012    EF 10-20% on ECHO 2013    Clotting disorder     Coronary artery disease involving native coronary artery of native heart without angina pectoris 11/26/2012    Cath 10- Stents patent non-obstructive disease Cath 11-12015 non-obstructive disease     Diverticulosis of colon     DVT (deep venous thrombosis), unspecified laterality 11/12/2015    Essential hypertension 11/15/2015    Hyperlipidemia      Hypertensive heart disease with heart failure 5/5/2016    MI (myocardial infarction) 2009    Nicotine abuse     Obesity 11/26/2012    Pulmonary embolism 2011    Stented coronary artery 11/26/2012    LAD stent placed 10/17/2007      Medication List with Changes/Refills   Current Medications    (MAGIC MOUTHWASH) 1:1:1 BENADRYL 12.5MG/5ML LIQ, ALUMINUM & MAGNESIUM HYDROXIDE-SIMEHTICONE (MAALOX), LIDOCAINE VISCOUS 2%    Swish and spit 5 mLs every 4 (four) hours as needed. for mouth sores    AMIODARONE (PACERONE) 200 MG TAB    Take 1.5 tablets (300 mg total) by mouth once daily.    AMOXICILLIN-CLAVULANATE 875-125MG (AUGMENTIN) 875-125 MG PER TABLET    TK 1 T PO  BID    ASPIRIN (ECOTRIN) 325 MG EC TABLET    Take 325 mg by mouth once daily.    ATORVASTATIN (LIPITOR) 80 MG TABLET    TAKE 1 TABLET(80 MG) BY MOUTH EVERY DAY    CLOPIDOGREL (PLAVIX) 75 MG TABLET    Take 75 mg by mouth once daily.    COLCHICINE ORAL    Take by mouth.    DEXLANSOPRAZOLE (DEXILANT) 60 MG CAPSULE    Take 60 mg by mouth once daily.    DOCUSATE SODIUM (COLACE) 50 MG CAPSULE    Take 1 capsule (50 mg total) by mouth once daily.    FUROSEMIDE (LASIX) 40 MG TABLET    TAKE 1 TABLET(40 MG) BY MOUTH EVERY DAY    HYDROCODONE-ACETAMINOPHEN 10-325MG (NORCO)  MG TAB    TK 1 T PO Q 8-10 H PRN P    HYDROCODONE-ACETAMINOPHEN 7.5-325MG (NORCO) 7.5-325 MG PER TABLET    1 tablet every 4 (four) hours as needed.     LISINOPRIL (PRINIVIL,ZESTRIL) 5 MG TABLET    Take 1 tablet (5 mg total) by mouth once daily.    METOPROLOL SUCCINATE (TOPROL-XL) 50 MG 24 HR TABLET    TAKE 1 TABLET BY MOUTH EVERY DAY    NITROGLYCERIN (NITROSTAT) 0.4 MG SL TABLET    PLACE 1 TABLET UNDER THE TOUNGE EVERY 5 MINUTES AS NEEDED FOR CHEST PAIN EVERY 3 DOSES/15MIN WITH NO RELIEF CALL 911    SPIRONOLACTONE (ALDACTONE) 25 MG TABLET    TAKE 1 TABLET(25 MG) BY MOUTH EVERY DAY    TRAMADOL (ULTRAM) 50 MG TABLET    Take 1 tablet (50 mg total) by mouth every 6 (six) hours as needed for Pain.  "    Social History     Social History    Marital status:      Spouse name: N/A    Number of children: 2    Years of education: N/A     Occupational History    [employer]      unemployed     Social History Main Topics    Smoking status: Former Smoker     Packs/day: 1.00     Years: 45.00     Types: Cigarettes     Quit date: 12/14/2015    Smokeless tobacco: Never Used      Comment: 1-1.5 ppd every day.    Alcohol use No    Drug use: No    Sexual activity: No     Other Topics Concern    Not on file     Social History Narrative    No narrative on file     Review of patient's allergies indicates:   Allergen Reactions    Iodine containing multivitamin     Keflex [cephalexin]     Peaches [peach (prunus persica)]     Shellfish containing products     Tuberculin spenser test ppd     Fig tree Rash       Review of Systems   Constitutional: Negative for chills, fatigue and fever.   Respiratory: Positive for shortness of breath (at baseline unchanged). Negative for cough and chest tightness.    Cardiovascular: Negative for chest pain.   Gastrointestinal: Negative for abdominal pain and blood in stool.   Genitourinary: Negative for dysuria and frequency.   Musculoskeletal: Positive for arthralgias, back pain and myalgias.         Health Maintenance:     Immunizations:   Influenza vacc today  TDap is up to date 6/2016  Pneumovax is up to date. 2014, Prevnar 2017  Zostavax rec, rx given to get at pharmacy today     Cancer Screening:  Colonoscopy: is not up to date. Ordered prev by other MD but says he was awaiting cards clearance prior to procedure since on asa and plavix; if unable to get can discuss FIT testing at f/u  PSA 7/2017 wnl    Objective:   BP (!) 84/58 (BP Location: Left arm, Patient Position: Sitting)   Pulse 69   Ht 5' 7" (1.702 m)   Wt 107.9 kg (237 lb 14 oz)   SpO2 97%   BMI 37.26 kg/m²        General: AAO x3, no apparent distress  CV: RRR, no m/r/g  Pulm: Lungs CTAB, no crackles, no " wheezes  Abd: s/NT/ND +BS  Extremities: no c/c/e x slight swelling on L ring finger, small abrasion on L wrist    Labs:     Lab Results   Component Value Date    WBC 8.14 09/19/2017    HGB 13.8 (L) 09/19/2017    HCT 39.8 (L) 09/19/2017    MCV 91 09/19/2017     09/19/2017     CMP  Sodium   Date Value Ref Range Status   08/11/2017 141 136 - 145 mmol/L Final     Potassium   Date Value Ref Range Status   08/11/2017 4.1 3.5 - 5.1 mmol/L Final     Chloride   Date Value Ref Range Status   08/11/2017 107 95 - 110 mmol/L Final     CO2   Date Value Ref Range Status   08/11/2017 22 (L) 23 - 29 mmol/L Final     Glucose   Date Value Ref Range Status   08/11/2017 106 70 - 110 mg/dL Final     BUN, Bld   Date Value Ref Range Status   08/11/2017 40 (H) 8 - 23 mg/dL Final     Creatinine   Date Value Ref Range Status   08/11/2017 1.7 (H) 0.5 - 1.4 mg/dL Final     Calcium   Date Value Ref Range Status   08/11/2017 8.6 (L) 8.7 - 10.5 mg/dL Final     Total Protein   Date Value Ref Range Status   08/11/2017 6.6 6.0 - 8.4 g/dL Final     Albumin   Date Value Ref Range Status   08/11/2017 3.7 3.5 - 5.2 g/dL Final     Total Bilirubin   Date Value Ref Range Status   08/11/2017 0.3 0.1 - 1.0 mg/dL Final     Comment:     For infants and newborns, interpretation of results should be based  on gestational age, weight and in agreement with clinical  observations.  Premature Infant recommended reference ranges:  Up to 24 hours.............<8.0 mg/dL  Up to 48 hours............<12.0 mg/dL  3-5 days..................<15.0 mg/dL  6-29 days.................<15.0 mg/dL       Alkaline Phosphatase   Date Value Ref Range Status   08/11/2017 96 55 - 135 U/L Final     AST   Date Value Ref Range Status   08/11/2017 21 10 - 40 U/L Final     ALT   Date Value Ref Range Status   08/11/2017 31 10 - 44 U/L Final     Anion Gap   Date Value Ref Range Status   08/11/2017 12 8 - 16 mmol/L Final     eGFR if    Date Value Ref Range Status    08/11/2017 47.9 (A) >60 mL/min/1.73 m^2 Final     eGFR if non    Date Value Ref Range Status   08/11/2017 41.4 (A) >60 mL/min/1.73 m^2 Final     Comment:     Calculation used to obtain the estimated glomerular filtration  rate (eGFR) is the CKD-EPI equation. Since race is unknown   in our information system, the eGFR values for   -American and Non--American patients are given   for each creatinine result.       Lab Results   Component Value Date    HGBA1C 6.0 (H) 07/21/2017     Lab Results   Component Value Date    PSA 1.7 07/21/2017    PSA 1.6 12/02/2009     Lab Results   Component Value Date    LDLCALC 57.6 (L) 07/21/2017     Lab Results   Component Value Date    IRON 78 07/21/2017    TIBC 333 07/21/2017    FERRITIN 181 07/21/2017     .    Assessment/Plan     Audrey was seen today for follow-up.    Diagnoses and all orders for this visit:    Chronic combined systolic and diastolic heart failure  Coronary artery disease involving native coronary artery of native heart without angina pectoris       - Followed by cards, has baseline dyspnea but stable, weights have been stable and taking all meds as rx'ed.  Cont routine f/u c Cards.     Hyperlipidemia LDL goal <70       - at goal.  Cont current medications.  Lab Results   Component Value Date    LDLCALC 57.6 (L) 07/21/2017       Hypertensive heart disease with heart failure       - Baseline low bp but asymptomatic.  Will cont meds as per Cards.    CKD (chronic kidney disease), stage III        - stablel on last check, appears at prev baseline, cont to monitor, cont acei.  Lab Results   Component Value Date    CREATININE 1.7 (H) 08/11/2017     Anemia, unspecified type       - Nos.  Likely aocd.  Needs csc but unsure if we will be able to do this based on his comorbidities, likely will order FIT testing at f/u  Lab Results   Component Value Date    FERRITIN 181 07/21/2017       Fall, sequela       - Recent xrays and CT results from ED  reviewed, rec tylenol prn, no acute issues    Idiopathic chronic gout of left foot without tophus       - Recent uric acid level very high, noted on chart review p pt left.  Since acute attack resolved, would like to start low dose allopurinol.  Attempted to call pt, no answer, can discuss at f/u appt.    HM as above  RTC in 3 mos for f/u.    January Khan MD  Department of Internal Medicine - [facility]  10/12/2017   11:16 AM

## 2017-10-23 DIAGNOSIS — I47.20 VT (VENTRICULAR TACHYCARDIA): ICD-10-CM

## 2017-10-23 RX ORDER — AMIODARONE HYDROCHLORIDE 200 MG/1
TABLET ORAL
Qty: 135 TABLET | Refills: 0 | Status: SHIPPED | OUTPATIENT
Start: 2017-10-23 | End: 2018-02-01 | Stop reason: SDUPTHER

## 2017-11-06 RX ORDER — FUROSEMIDE 40 MG/1
TABLET ORAL
Qty: 90 TABLET | Refills: 0 | Status: SHIPPED | OUTPATIENT
Start: 2017-11-06 | End: 2018-02-01 | Stop reason: SDUPTHER

## 2017-11-20 DIAGNOSIS — I50.42 CHRONIC COMBINED SYSTOLIC AND DIASTOLIC HEART FAILURE: Chronic | ICD-10-CM

## 2017-11-20 RX ORDER — SPIRONOLACTONE 25 MG/1
TABLET ORAL
Qty: 90 TABLET | Refills: 0 | Status: SHIPPED | OUTPATIENT
Start: 2017-11-20 | End: 2018-02-20 | Stop reason: SDUPTHER

## 2017-11-25 ENCOUNTER — HOSPITAL ENCOUNTER (EMERGENCY)
Facility: HOSPITAL | Age: 67
Discharge: ANOTHER HOSPITAL | End: 2017-11-26
Attending: EMERGENCY MEDICINE
Payer: MEDICARE

## 2017-11-25 DIAGNOSIS — R20.2 NUMBNESS AND TINGLING IN RIGHT HAND: ICD-10-CM

## 2017-11-25 DIAGNOSIS — M79.603 ARM PAIN: ICD-10-CM

## 2017-11-25 DIAGNOSIS — I74.2: Primary | ICD-10-CM

## 2017-11-25 DIAGNOSIS — R20.0 NUMBNESS AND TINGLING IN RIGHT HAND: ICD-10-CM

## 2017-11-25 DIAGNOSIS — R20.0 HAND NUMBNESS: ICD-10-CM

## 2017-11-25 PROCEDURE — 96374 THER/PROPH/DIAG INJ IV PUSH: CPT | Mod: NTX

## 2017-11-25 PROCEDURE — 99285 EMERGENCY DEPT VISIT HI MDM: CPT | Mod: 25,NTX

## 2017-11-25 PROCEDURE — 94760 N-INVAS EAR/PLS OXIMETRY 1: CPT | Mod: NTX

## 2017-11-26 ENCOUNTER — HOSPITAL ENCOUNTER (EMERGENCY)
Facility: HOSPITAL | Age: 67
Discharge: HOME | End: 2017-11-26
Attending: EMERGENCY MEDICINE | Admitting: SURGERY
Payer: MEDICARE

## 2017-11-26 VITALS
DIASTOLIC BLOOD PRESSURE: 57 MMHG | BODY MASS INDEX: 32.49 KG/M2 | HEART RATE: 69 BPM | SYSTOLIC BLOOD PRESSURE: 107 MMHG | TEMPERATURE: 98 F | OXYGEN SATURATION: 97 % | HEIGHT: 67 IN | RESPIRATION RATE: 17 BRPM | WEIGHT: 207 LBS

## 2017-11-26 VITALS
BODY MASS INDEX: 35.19 KG/M2 | RESPIRATION RATE: 20 BRPM | WEIGHT: 224.19 LBS | OXYGEN SATURATION: 98 % | SYSTOLIC BLOOD PRESSURE: 105 MMHG | HEIGHT: 67 IN | DIASTOLIC BLOOD PRESSURE: 56 MMHG | HEART RATE: 63 BPM | TEMPERATURE: 98 F

## 2017-11-26 DIAGNOSIS — I74.2 BRACHIAL ARTERY EMBOLUS: ICD-10-CM

## 2017-11-26 DIAGNOSIS — R20.2 RIGHT HAND PARESTHESIA: ICD-10-CM

## 2017-11-26 DIAGNOSIS — I74.2 BRACHIAL ARTERY EMBOLUS: Primary | ICD-10-CM

## 2017-11-26 DIAGNOSIS — M79.641 RIGHT HAND PAIN: ICD-10-CM

## 2017-11-26 DIAGNOSIS — I48.91 ATRIAL FIBRILLATION: ICD-10-CM

## 2017-11-26 LAB
ABO + RH BLD: NORMAL
ALBUMIN SERPL BCP-MCNC: 4.2 G/DL
ALP SERPL-CCNC: 89 U/L
ALT SERPL W/O P-5'-P-CCNC: 49 U/L
ANION GAP SERPL CALC-SCNC: 13 MMOL/L
APTT BLDCRRT: 26.6 SEC
AST SERPL-CCNC: 30 U/L
BASOPHILS # BLD AUTO: 0.03 K/UL
BASOPHILS NFR BLD: 0.4 %
BILIRUB SERPL-MCNC: 0.5 MG/DL
BLD GP AB SCN CELLS X3 SERPL QL: NORMAL
BUN SERPL-MCNC: 44 MG/DL
CALCIUM SERPL-MCNC: 8.9 MG/DL
CHLORIDE SERPL-SCNC: 101 MMOL/L
CO2 SERPL-SCNC: 26 MMOL/L
CREAT SERPL-MCNC: 1.84 MG/DL
DIFFERENTIAL METHOD: ABNORMAL
EOSINOPHIL # BLD AUTO: 0.2 K/UL
EOSINOPHIL NFR BLD: 2.5 %
ERYTHROCYTE [DISTWIDTH] IN BLOOD BY AUTOMATED COUNT: 12.9 %
EST. GFR  (AFRICAN AMERICAN): 43.5 ML/MIN/1.73 M^2
EST. GFR  (NON AFRICAN AMERICAN): 37.6 ML/MIN/1.73 M^2
GLUCOSE SERPL-MCNC: 104 MG/DL
HCT VFR BLD AUTO: 38.2 %
HGB BLD-MCNC: 12.5 G/DL
INR PPP: 0.9
LYMPHOCYTES # BLD AUTO: 2.4 K/UL
LYMPHOCYTES NFR BLD: 31.6 %
MCH RBC QN AUTO: 31.2 PG
MCHC RBC AUTO-ENTMCNC: 32.7 G/DL
MCV RBC AUTO: 95 FL
MONOCYTES # BLD AUTO: 1 K/UL
MONOCYTES NFR BLD: 13.1 %
NEUTROPHILS # BLD AUTO: 4 K/UL
NEUTROPHILS NFR BLD: 51.7 %
PLATELET # BLD AUTO: 275 K/UL
PMV BLD AUTO: 10 FL
POTASSIUM SERPL-SCNC: 4.1 MMOL/L
PROT SERPL-MCNC: 7 G/DL
PROTHROMBIN TIME: 10.1 SEC
RBC # BLD AUTO: 4.01 M/UL
SODIUM SERPL-SCNC: 140 MMOL/L
TROPONIN I SERPL DL<=0.01 NG/ML-MCNC: 0.02 NG/ML
WBC # BLD AUTO: 7.66 K/UL

## 2017-11-26 PROCEDURE — C1769 GUIDE WIRE: HCPCS | Mod: NTX | Performed by: SURGERY

## 2017-11-26 PROCEDURE — 93010 ELECTROCARDIOGRAM REPORT: CPT | Mod: NTX,,, | Performed by: INTERNAL MEDICINE

## 2017-11-26 PROCEDURE — 84484 ASSAY OF TROPONIN QUANT: CPT | Mod: NTX

## 2017-11-26 PROCEDURE — 85610 PROTHROMBIN TIME: CPT | Mod: NTX

## 2017-11-26 PROCEDURE — 99499 UNLISTED E&M SERVICE: CPT | Mod: NTX,,, | Performed by: SURGERY

## 2017-11-26 PROCEDURE — C1729 CATH, DRAINAGE: HCPCS | Mod: NTX | Performed by: SURGERY

## 2017-11-26 PROCEDURE — 99285 EMERGENCY DEPT VISIT HI MDM: CPT | Mod: GC,NTX,, | Performed by: SURGERY

## 2017-11-26 PROCEDURE — 63600175 PHARM REV CODE 636 W HCPCS: Mod: NTX | Performed by: EMERGENCY MEDICINE

## 2017-11-26 PROCEDURE — C1757 CATH, THROMBECTOMY/EMBOLECT: HCPCS | Mod: NTX | Performed by: SURGERY

## 2017-11-26 PROCEDURE — 85730 THROMBOPLASTIN TIME PARTIAL: CPT | Mod: NTX

## 2017-11-26 PROCEDURE — 25000003 PHARM REV CODE 250: Mod: NTX | Performed by: EMERGENCY MEDICINE

## 2017-11-26 PROCEDURE — 93005 ELECTROCARDIOGRAM TRACING: CPT | Mod: NTX

## 2017-11-26 PROCEDURE — 80053 COMPREHEN METABOLIC PANEL: CPT | Mod: NTX

## 2017-11-26 PROCEDURE — 86901 BLOOD TYPING SEROLOGIC RH(D): CPT | Mod: NTX

## 2017-11-26 PROCEDURE — 99284 EMERGENCY DEPT VISIT MOD MDM: CPT | Mod: NTX,,, | Performed by: EMERGENCY MEDICINE

## 2017-11-26 PROCEDURE — 85025 COMPLETE CBC W/AUTO DIFF WBC: CPT | Mod: NTX

## 2017-11-26 PROCEDURE — 86900 BLOOD TYPING SEROLOGIC ABO: CPT | Mod: NTX

## 2017-11-26 PROCEDURE — 99285 EMERGENCY DEPT VISIT HI MDM: CPT | Mod: 25,NTX

## 2017-11-26 RX ORDER — OXYCODONE AND ACETAMINOPHEN 5; 325 MG/1; MG/1
1 TABLET ORAL EVERY 4 HOURS PRN
Qty: 12 TABLET | Refills: 0 | Status: SHIPPED | OUTPATIENT
Start: 2017-11-26 | End: 2018-01-12

## 2017-11-26 RX ORDER — HEPARIN SODIUM 5000 [USP'U]/ML
8000 INJECTION, SOLUTION INTRAVENOUS; SUBCUTANEOUS
Status: COMPLETED | OUTPATIENT
Start: 2017-11-26 | End: 2017-11-26

## 2017-11-26 RX ORDER — HEPARIN SODIUM,PORCINE/D5W 25000/250
17 INTRAVENOUS SOLUTION INTRAVENOUS CONTINUOUS
Status: DISCONTINUED | OUTPATIENT
Start: 2017-11-26 | End: 2017-11-26

## 2017-11-26 RX ORDER — ASPIRIN 325 MG
325 TABLET ORAL
Status: DISCONTINUED | OUTPATIENT
Start: 2017-11-26 | End: 2017-11-26 | Stop reason: HOSPADM

## 2017-11-26 RX ORDER — OXYCODONE AND ACETAMINOPHEN 5; 325 MG/1; MG/1
1 TABLET ORAL
Status: COMPLETED | OUTPATIENT
Start: 2017-11-26 | End: 2017-11-26

## 2017-11-26 RX ORDER — MORPHINE SULFATE 2 MG/ML
6 INJECTION, SOLUTION INTRAMUSCULAR; INTRAVENOUS
Status: DISCONTINUED | OUTPATIENT
Start: 2017-11-26 | End: 2017-11-26 | Stop reason: HOSPADM

## 2017-11-26 RX ADMIN — HEPARIN SODIUM 8000 UNITS: 5000 INJECTION, SOLUTION INTRAVENOUS; SUBCUTANEOUS at 03:11

## 2017-11-26 RX ADMIN — OXYCODONE HYDROCHLORIDE AND ACETAMINOPHEN 1 TABLET: 5; 325 TABLET ORAL at 07:11

## 2017-11-26 NOTE — ASSESSMENT & PLAN NOTE
66 yo M with h/o  CAD s/p STEMI (s/p PCI LAD 2007),HFrEF (15% 6/2017), PE (2010 s/p 1-yr coumadin), atrial fibrillation. HTN and s/p ICD presenting with one week h/o of RUE pain.    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

## 2017-11-26 NOTE — SUBJECTIVE & OBJECTIVE
(Not in a hospital admission)    Review of patient's allergies indicates:   Allergen Reactions    Iodine containing multivitamin     Keflex [cephalexin]     Peaches [peach (prunus persica)]     Shellfish containing products     Tuberculin spenser test ppd     Fig tree Rash       Past Medical History:   Diagnosis Date    Chronic anticoagulation 5/5/2016    Chronic combined systolic and diastolic heart failure 11/26/2012    EF 10-20% on ECHO 2013    Clotting disorder     Coronary artery disease involving native coronary artery of native heart without angina pectoris 11/26/2012    Cath 10- Stents patent non-obstructive disease Cath 11-12015 non-obstructive disease     Diverticulosis of colon     DVT (deep venous thrombosis), unspecified laterality 11/12/2015    Essential hypertension 11/15/2015    Hyperlipidemia     Hypertensive heart disease with heart failure 5/5/2016    MI (myocardial infarction) 2009    Nicotine abuse     Obesity 11/26/2012    Pulmonary embolism 2011    Stented coronary artery 11/26/2012    LAD stent placed 10/17/2007      Past Surgical History:   Procedure Laterality Date    APPENDECTOMY      BACK SURGERY      CARDIAC SURGERY      stent    r knee scope      SPINE SURGERY      TONSILLECTOMY       Family History     Problem Relation (Age of Onset)    Cancer Mother, Paternal Grandmother    Colon polyps Father    Diabetes Mother    Heart disease Mother, Father    No Known Problems Sister, Daughter, Son, Sister, Son    Stroke Father        Social History Main Topics    Smoking status: Former Smoker     Packs/day: 1.00     Years: 45.00     Types: Cigarettes     Quit date: 12/14/2015    Smokeless tobacco: Never Used      Comment: 1-1.5 ppd every day.    Alcohol use No    Drug use: No    Sexual activity: No     Review of Systems   Constitutional: Negative for activity change, chills and fatigue.   HENT: Negative for congestion and drooling.    Eyes: Negative for pain,  discharge and itching.   Respiratory: Negative for apnea, chest tightness and shortness of breath.    Cardiovascular: Negative for chest pain and leg swelling.   Gastrointestinal: Negative for anal bleeding.   Endocrine: Negative for cold intolerance and heat intolerance.   Genitourinary: Negative for difficulty urinating and dysuria.   Musculoskeletal: Positive for gait problem and myalgias. Negative for arthralgias.   Skin: Negative for color change and pallor.   Allergic/Immunologic: Negative for environmental allergies.   Neurological: Negative for dizziness and facial asymmetry.   Hematological: Negative for adenopathy. Does not bruise/bleed easily.   Psychiatric/Behavioral: Negative for agitation and behavioral problems.     Objective:     Vital Signs (Most Recent):  Temp: 98.5 °F (36.9 °C) (11/26/17 0414)  Pulse: 63 (11/26/17 0619)  Resp: 18 (11/26/17 0438)  BP: 131/64 (11/26/17 0619)  SpO2: 97 % (11/26/17 0619) Vital Signs (24h Range):  Temp:  [97.8 °F (36.6 °C)-98.5 °F (36.9 °C)] 98.5 °F (36.9 °C)  Pulse:  [63-72] 63  Resp:  [17-26] 18  SpO2:  [96 %-98 %] 97 %  BP: (101-131)/(57-79) 131/64     Weight: 101.7 kg (224 lb 3.3 oz)  Body mass index is 35.12 kg/m².    Physical Exam   Constitutional: He is oriented to person, place, and time. He appears well-developed and well-nourished.   HENT:   Head: Normocephalic and atraumatic.   Eyes: EOM are normal. Pupils are equal, round, and reactive to light.   Cardiovascular: Regular rhythm.    Pulses:       Femoral pulses are 2+ on the right side, and 2+ on the left side.       Dorsalis pedis pulses are 1+ on the right side, and 1+ on the left side.   With APC  Right palpable brachial pulse; 2+ radial pulse, ulnar signal   Pulmonary/Chest: Effort normal. No respiratory distress.   Abdominal: Soft. He exhibits no distension. There is no tenderness.   Musculoskeletal: Normal range of motion. He exhibits no edema.   Pain with flexion/extension of right wrist    Neurological: He is alert and oriented to person, place, and time.   Poor historian  Sensation right hand intact; pain with flexion of 4th and fifth digits   Skin: Skin is warm and dry.   Psychiatric: He has a normal mood and affect. His behavior is normal.       Significant Labs:  CBC:   Recent Labs  Lab 11/26/17 0024   WBC 7.66   RBC 4.01*   HGB 12.5*   HCT 38.2*      MCV 95   MCH 31.2*   MCHC 32.7     CMP:   Recent Labs  Lab 11/26/17 0024      CALCIUM 8.9   ALBUMIN 4.2   PROT 7.0      K 4.1   CO2 26      BUN 44*   CREATININE 1.84*   ALKPHOS 89   ALT 49*   AST 30   BILITOT 0.5     Coagulation:   Recent Labs  Lab 11/26/17 0024   LABPROT 10.1   INR 0.9   APTT 26.6       Significant Diagnostics:  Time of Procedure: 11/26/17 04:39:00  Accession # [id number]    Clinical indication:  brachial/embolus.    Technique:  Bilateral upper extremity arterial duplex ultrasound examination performed. Multiple gray scale, color and doppler images were obtained in addition to waveform analysis.      Comparison: The upper extremity vascular ultrasound 11/26/17 1:20 AM    Findings:    The peak systolic velocities on the right are as follows, in centimeters/second:  Subclavian artery: 71 cm/sec.  Axillary artery: 81 cm/sec.  Proximal brachial artery: 103 cm/sec.  Mid brachial artery: 93 cm/sec.  Distal brachial artery: 104 cm/sec.  Radial artery: 109 cm/sec.  Ulnar artery: 158 cm/sec.     Arterial waveforms do not demonstrate convincing evidence of a stenosis with biphasic or triphasic waveforms in the right upper extremity.   Impression         No evidence of stenosis or thrombus.   ______________________________________

## 2017-11-26 NOTE — ED NOTES
Pt c/o numbness in right hand when he lies his left side. Dr. Nation in room and dopplered radial pulse and pulses of all 5 fingers and palm of right hand while pt is sitting up straight and when he laid on left side (while hand was numb) all pulses strong and present.

## 2017-11-26 NOTE — CONSULTS
[facility]  Vascular Surgery  Consult Note    Consults  Subjective:     Chief Complaint/Reason for Admission: concern for right upper arm ischemia    History of Present Illness: 68 yo M with h/o  CAD s/p STEMI (s/p PCI LAD 2007),HFrEF (15% 6/2017), PE (2010 s/p 1-yr coumadin), atrial fibrillation. HTN and s/p ICD presenting with one week h/o of RUE pain.  Patient reports that he thought his right hand pain was from gout; worse when he flexes or extends his right wrist.  Also has pain with movement of his right fourth and fifth digits.  He decided to come to the hospital secondary to pain not improving.  Was originally seen at [facility]- where US done showing right ulnar thrombus; he was transferred to [facility] for further evaluation.  Has had multiple MI with PCI, reduced EF with ICD.  On ASA, plavix; does not know why not on anticoagulation with h/o atrial fibrillation.         No new subjective & objective note has been filed under this hospital service since the last note was generated.    Assessment/Plan:     * Right hand pain    68 yo M with h/o  CAD s/p STEMI (s/p PCI LAD 2007),HFrEF (15% 6/2017), PE (2010 s/p 1-yr coumadin), atrial fibrillation. HTN and s/p ICD presenting with one week h/o of RUE pain.    Patient with palpable radial pulse; ulnar signal; repeat US done demonstrating high bifurcation at mid upper arm with patent bifurcation of ulnar and radial arteries and patent ulnar artery with no evidence of thrombus  Patient does have brachial/ulnar thrombus; no need for operation, no need for heparin gtt  Currently in sinus rhythm; can follow up with cardiology team for possible need for anticoagulation  Follow up with vascular surgery as needed; please call with questions or concerns            Thank you for your consult.  Barb Petersen MD  Vascular Surgery  [facility]    Vascular Attending  Agree with above  Pt seen and examined by me ~430AM 11/26/17  2+ R  radial pulse; biphasic R ulnar doppler signal.  Motor intact in R hand  Reviewed u/s obtained at [facility]; patent R brachial, radial and ulnar arteries  No need for intervention; outpatient as above for management of his multiple comorbidities.   Low White MD FACS

## 2017-11-26 NOTE — ED NOTES
Report received from stat rad. Transfer center called, spoke with Jaxson, states Dr. White with Vascular medicine will call back.

## 2017-11-26 NOTE — ED TRIAGE NOTES
Audrey Oneil , a 67 y.o. male presents to the ED via EMS from [facility] for possible embolectomy to right arm. Pt c/o right hand numbness/tingling. Pt reports having hx of clots in the past to right arm and leg. Radial pulse +1. Pt received bolus dose of Heparin 8000 units at [facility].    Chief Complaint   Patient presents with    transfer      pt presents to the ed from [facility] for eval of an arterial occulsion      Review of patient's allergies indicates:   Allergen Reactions    Iodine containing multivitamin     Keflex [cephalexin]     Peaches [peach (prunus persica)]     Shellfish containing products     Tuberculin spenser test ppd     Fig tree Rash     Past Medical History:   Diagnosis Date    Chronic anticoagulation 5/5/2016    Chronic combined systolic and diastolic heart failure 11/26/2012    EF 10-20% on ECHO 2013    Clotting disorder     Coronary artery disease involving native coronary artery of native heart without angina pectoris 11/26/2012    Cath 10- Stents patent non-obstructive disease Cath 11-12015 non-obstructive disease     Diverticulosis of colon     DVT (deep venous thrombosis), unspecified laterality 11/12/2015    Essential hypertension 11/15/2015    Hyperlipidemia     Hypertensive heart disease with heart failure 5/5/2016    MI (myocardial infarction) 2009    Nicotine abuse     Obesity 11/26/2012    Pulmonary embolism 2011    Stented coronary artery 11/26/2012    LAD stent placed 10/17/2007

## 2017-11-26 NOTE — ED PROVIDER NOTES
Encounter Date: 11/25/2017       History     Chief Complaint   Patient presents with    Hand Pain     c/o right hand pain and numbness when pt lays down. states pain and numbness goes away when he sits up. denies any injury or any other s/s.      Pt is a 66 yo male with multiple medical problems including CHF, CAD, MI, ICD, HTN, hyperlipidemia, DVT, PE and former tobacco abuse. He comes to the ED a complaint of numbness, pain and weakness in his right hand when he lies on his left side. Symptoms completely resolve when pt sits upright. Pt is concerned that he is having a blood flow problem with his hand. Symptoms due not occur when pt exercises his right arm when standing and patient was house painting today with his right arm today without pain.           Review of patient's allergies indicates:   Allergen Reactions    Iodine containing multivitamin     Keflex [cephalexin]     Peaches [peach (prunus persica)]     Shellfish containing products     Tuberculin spenser test ppd     Fig tree Rash     Past Medical History:   Diagnosis Date    Chronic anticoagulation 5/5/2016    Chronic combined systolic and diastolic heart failure 11/26/2012    EF 10-20% on ECHO 2013    Clotting disorder     Coronary artery disease involving native coronary artery of native heart without angina pectoris 11/26/2012    Cath 10- Stents patent non-obstructive disease Cath 11-12015 non-obstructive disease     Diverticulosis of colon     DVT (deep venous thrombosis), unspecified laterality 11/12/2015    Essential hypertension 11/15/2015    Hyperlipidemia     Hypertensive heart disease with heart failure 5/5/2016    MI (myocardial infarction) 2009    Nicotine abuse     Obesity 11/26/2012    Pulmonary embolism 2011    Stented coronary artery 11/26/2012    LAD stent placed 10/17/2007      Past Surgical History:   Procedure Laterality Date    APPENDECTOMY      BACK SURGERY      CARDIAC SURGERY      stent    r knee  scope      SPINE SURGERY      TONSILLECTOMY       Family History   Problem Relation Age of Onset    Cancer Mother      colon cancer    Heart disease Mother     Diabetes Mother     Heart disease Father     Stroke Father     Colon polyps Father     Cancer Paternal Grandmother      leukemia    No Known Problems Sister     No Known Problems Daughter     No Known Problems Son     No Known Problems Sister     No Known Problems Son      Social History   Substance Use Topics    Smoking status: Former Smoker     Packs/day: 1.00     Years: 45.00     Types: Cigarettes     Quit date: 12/14/2015    Smokeless tobacco: Never Used      Comment: 1-1.5 ppd every day.    Alcohol use No     Review of Systems   Constitutional: Negative for fatigue and fever.   HENT: Negative for sore throat.    Respiratory: Negative for chest tightness and shortness of breath.    Cardiovascular: Negative for chest pain, palpitations and leg swelling.   Gastrointestinal: Negative for abdominal distention, abdominal pain, nausea and vomiting.   Genitourinary: Negative for dysuria.   Musculoskeletal: Negative for back pain, neck pain and neck stiffness.        See HPI   Skin: Negative for rash.   Neurological: Positive for numbness. Negative for weakness.   Hematological: Does not bruise/bleed easily.   All other systems reviewed and are negative.      Physical Exam     Initial Vitals [11/25/17 2335]   BP Pulse Resp Temp SpO2   (!) 122/57 65 17 97.8 °F (36.6 °C) 96 %      MAP       78.67         Physical Exam    Nursing note and vitals reviewed.  Constitutional: Vital signs are normal. He appears well-developed and well-nourished. No distress.   HENT:   Head: Normocephalic and atraumatic.   Eyes: EOM are normal. Pupils are equal, round, and reactive to light.   Neck: Normal range of motion. Neck supple.   Cardiovascular: Normal rate, regular rhythm and normal heart sounds.   Pulmonary/Chest: Breath sounds normal. No respiratory distress.  He has no wheezes. He has no rhonchi. He has no rales. He exhibits no tenderness.   Abdominal: Soft. He exhibits no distension. There is no tenderness. There is no rebound and no guarding.   Musculoskeletal: Normal range of motion. He exhibits no tenderness.   Pt reports numbness to right hand only when laying on left side.He also reports pain to left hand.  US doppler: right brachial artery 2+                      Right radial (2), right ulnar (1), right palmar arch (1), signals detected in each digit of right hand. Cap refill <2 sec   Neurological: He is alert and oriented to person, place, and time. He displays normal reflexes. A sensory deficit is present. No cranial nerve deficit.   Pt reports numbness to right hand only when laying on left side.He also reports pain to left hand.  US doppler: right brachial artery 2+                      Right radial (2), right ulnar (1), right palmar arch (1), signals detected in each digit of right hand. Cap refill <2 sec   Skin: Skin is warm and dry.   Psychiatric: He has a normal mood and affect.   anxious         ED Course   Procedures  Labs Reviewed   CBC W/ AUTO DIFFERENTIAL - Abnormal; Notable for the following:        Result Value    RBC 4.01 (*)     Hemoglobin 12.5 (*)     Hematocrit 38.2 (*)     MCH 31.2 (*)     All other components within normal limits   COMPREHENSIVE METABOLIC PANEL - Abnormal; Notable for the following:     BUN, Bld 44 (*)     Creatinine 1.84 (*)     ALT 49 (*)     eGFR if  43.5 (*)     eGFR if non  37.6 (*)     All other components within normal limits   TROPONIN I                          Attending Attestation:         Attending Critical Care:   Critical Care Times:   Direct Patient Care (initial evaluation, reassessments, and time considering the case)................................................................15 minutes.   Additional History from reviewing old medical records or taking additional history  from the family, EMS, PCP, etc.......................5 minutes.   Ordering, Reviewing, and Interpreting Diagnostic Studies...............................................................................................................5 minutes.   Documentation..................................................................................................................................................................................10 minutes.   Consultation with other Physicians. .................................................................................................................................................10 minutes.   ==============================================================  · Total Critical Care Time - exclusive of procedural time: 45 minutes.  ==============================================================  Critical care reasons: arterial occlusion.   Critical care was time spent personally by me on the following activities: obtaining history from patient or relative, examination of patient, review of x-rays / CT sent with the patient, review of old charts, ordering lab, x-rays, and/or EKG, development of treatment plan with patient or relative, ordering and performing treatments and interventions, evaluation of patient's response to treatment, discussion with consultants and re-evaluation of patient's conition.   [facility] ED MDM PLUS CC 4: limb threatening.               ED Course      Clinical Impression:   The primary encounter diagnosis was Arterial embolus and thrombosis of upper extremity. Diagnoses of Numbness and tingling in right hand, Hand numbness, and Arm pain were also pertinent to this visit.                           Juan Nation MD  11/26/17 0259

## 2017-11-26 NOTE — HPI
66 yo M with h/o  CAD s/p STEMI (s/p PCI LAD 2007),HFrEF (15% 6/2017), PE (2010 s/p 1-yr coumadin), atrial fibrillation. HTN and s/p ICD presenting with one week h/o of RUE pain.  Patient reports that he thought his right hand pain was from gout; worse when he flexes or extends his right wrist.  Also has pain with movement of his right fourth and fifth digits.  He decided to come to the hospital secondary to pain not improving.  Was originally seen at [facility]- where US done showing right ulnar thrombus; he was transferred to [facility] for further evaluation.  Has had multiple MI with PCI, reduced EF with ICD.  On ASA, plavix; does not know why not on anticoagulation with h/o atrial fibrillation.

## 2017-11-26 NOTE — ASSESSMENT & PLAN NOTE
68 yo M with h/o  CAD s/p STEMI (s/p PCI LAD 2007),HFrEF (15% 6/2017), PE (2010 s/p 1-yr coumadin), atrial fibrillation. HTN and s/p ICD presenting with one week h/o of RUE pain.    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

## 2017-11-26 NOTE — ED PROVIDER NOTES
Encounter Date: 11/26/2017    SCRIBE #1 NOTE: I, Idania Sylvester, am scribing for, and in the presence of,  Dr. Gonzalez. I have scribed the entire note.       History     Chief Complaint   Patient presents with    transfer      pt presents to the ed from [facility] for eval of an arterial occulsion      Time patient was seen by the provider: 4:36 AM      The patient is a 67 y.o. male with hx of: MI, pulmonary embolism, CAD, HTN, obesity, clotting disorder, DVT, and an extensive medical history, that was transfered to the ED for evaluation of an arterial occlusion, with a complaint of numbness, tingling, and 9/10 pain in his right hand when he lies down. When he sits up symptoms resolve. He is a former smoker and quit over 15 years ago.       The history is provided by the patient and medical records.     Review of patient's allergies indicates:   Allergen Reactions    Iodine containing multivitamin     Keflex [cephalexin]     Peaches [peach (prunus persica)]     Shellfish containing products     Tuberculin spenser test ppd     Fig tree Rash     Past Medical History:   Diagnosis Date    Chronic anticoagulation 5/5/2016    Chronic combined systolic and diastolic heart failure 11/26/2012    EF 10-20% on ECHO 2013    Clotting disorder     Coronary artery disease involving native coronary artery of native heart without angina pectoris 11/26/2012    Cath 10- Stents patent non-obstructive disease Cath 11-12015 non-obstructive disease     Diverticulosis of colon     DVT (deep venous thrombosis), unspecified laterality 11/12/2015    Essential hypertension 11/15/2015    Hyperlipidemia     Hypertensive heart disease with heart failure 5/5/2016    MI (myocardial infarction) 2009    Nicotine abuse     Obesity 11/26/2012    Pulmonary embolism 2011    Stented coronary artery 11/26/2012    LAD stent placed 10/17/2007      Past Surgical History:   Procedure Laterality Date    APPENDECTOMY      BACK  SURGERY      CARDIAC SURGERY      stent    r knee scope      SPINE SURGERY      TONSILLECTOMY       Family History   Problem Relation Age of Onset    Cancer Mother      colon cancer    Heart disease Mother     Diabetes Mother     Heart disease Father     Stroke Father     Colon polyps Father     Cancer Paternal Grandmother      leukemia    No Known Problems Sister     No Known Problems Daughter     No Known Problems Son     No Known Problems Sister     No Known Problems Son      Social History   Substance Use Topics    Smoking status: Former Smoker     Packs/day: 1.00     Years: 45.00     Types: Cigarettes     Quit date: 12/14/2015    Smokeless tobacco: Never Used      Comment: 1-1.5 ppd every day.    Alcohol use No     Review of Systems   Constitutional: Negative for fever.   HENT: Negative for sore throat.    Eyes: Negative for visual disturbance.   Respiratory: Negative for shortness of breath.    Cardiovascular: Negative for chest pain.   Gastrointestinal: Negative for nausea.   Genitourinary: Negative for dysuria.   Musculoskeletal: Negative for back pain.        Positive for numbness, tingling, and 9/10 pain in his right hand.   Skin: Negative for rash.   Neurological: Negative for weakness.       Physical Exam     Initial Vitals [11/26/17 0414]   BP Pulse Resp Temp SpO2   101/79 66 18 98.5 °F (36.9 °C) 98 %      MAP       86.33         Physical Exam    Nursing note and vitals reviewed.  Constitutional: He appears well-developed and well-nourished. He is not diaphoretic. No distress.   HENT:   Head: Normocephalic and atraumatic.   Mouth/Throat: Oropharynx is clear and moist.   Eyes: Pupils are equal, round, and reactive to light.   Neck: Normal range of motion. Neck supple. No JVD present.   Cardiovascular: Normal rate, regular rhythm and normal heart sounds.   Palpable radial pulse on the right. DP pulses intact.   Pulmonary/Chest: Breath sounds normal. No respiratory distress. He has no  wheezes. He has no rhonchi. He has no rales.   Abdominal: Soft. He exhibits no distension. There is no tenderness.   Musculoskeletal: Normal range of motion. He exhibits no edema.   No peripheral edema or calf asymmetry.   Lymphadenopathy:     He has no cervical adenopathy.   Neurological: He is alert and oriented to person, place, and time. He has normal strength. No cranial nerve deficit or sensory deficit.   Skin: Skin is warm and dry.         ED Course   Procedures  Labs Reviewed   TYPE & SCREEN             Medical Decision Making:   History:   Old Medical Records: I decided to obtain old medical records.  Initial Assessment:   Urgent evaluation at 67-year-old male transferred from [facility] for evaluation of acute arterial occlusion upper extremity.  The plan was to take patient to the OR immediately for embolectomy.    Vascular Surgery was contacted, they are at the bedside.  There is palpable radial pulse and ulnar signal.  Patient sent for ultrasound of the right upper extremity which shows no evidence of acute thrombus or occlusion.  His labs are otherwise unremarkable except for CKD.      I have considered acute gout, neuropathy, cervical radiculopathy, carpal tunnel syndrome, septic arthritis    Patient will be discharged to follow up with PCP for further evaluation of his symptoms.  Patient urged to return to the ED immediately if symptoms worsen.  Other:   I have discussed this case with another health care provider.            Scribe Attestation:   Scribe #1: I performed the above scribed service and the documentation accurately describes the services I performed. I attest to the accuracy of the note.            ED Course      Clinical Impression:   Right hand pain  Right hand paresthesia                         Yasmeen Gonzalez MD  11/26/17 0733

## 2017-11-26 NOTE — ED NOTES
[id number] arrived to transport pt. Report given to remedios Bennett. All paperwork given to medic

## 2017-11-26 NOTE — ED NOTES
Report given to Vesna ALVARADO RN. Pt informed that Dr. Gonzalez will be notified about request for different pain medication.

## 2017-11-26 NOTE — ED NOTES
Per Dr. Petersen with vascular, pt to go to ultrasound without starting heparin and without monitoring.

## 2017-11-26 NOTE — ED NOTES
Pt resting in stretcher, maintained on continuous monitoring. Requesting different pain medication (oral vs IV). Will notify Dr. Gonzalez.

## 2017-12-18 RX ORDER — ATORVASTATIN CALCIUM 80 MG/1
TABLET, FILM COATED ORAL
Qty: 90 TABLET | Refills: 0 | Status: SHIPPED | OUTPATIENT
Start: 2017-12-18 | End: 2018-01-12

## 2017-12-19 RX ORDER — CLOPIDOGREL BISULFATE 75 MG/1
TABLET ORAL
Qty: 90 TABLET | Refills: 3 | Status: SHIPPED | OUTPATIENT
Start: 2017-12-19 | End: 2018-01-12

## 2017-12-31 RX ORDER — METOPROLOL SUCCINATE 50 MG/1
TABLET, EXTENDED RELEASE ORAL
Qty: 90 TABLET | Refills: 3 | Status: SHIPPED | OUTPATIENT
Start: 2017-12-31 | End: 2018-01-12

## 2018-01-10 ENCOUNTER — OFFICE VISIT (OUTPATIENT)
Dept: INTERNAL MEDICINE | Facility: CLINIC | Age: 68
End: 2018-01-10
Payer: MEDICARE

## 2018-01-10 VITALS
OXYGEN SATURATION: 97 % | SYSTOLIC BLOOD PRESSURE: 142 MMHG | DIASTOLIC BLOOD PRESSURE: 86 MMHG | HEART RATE: 78 BPM | HEIGHT: 67 IN | BODY MASS INDEX: 36.29 KG/M2 | WEIGHT: 231.25 LBS

## 2018-01-10 DIAGNOSIS — N18.30 CKD (CHRONIC KIDNEY DISEASE), STAGE III: ICD-10-CM

## 2018-01-10 DIAGNOSIS — G56.01 CARPAL TUNNEL SYNDROME ON RIGHT: ICD-10-CM

## 2018-01-10 DIAGNOSIS — I25.10 CORONARY ARTERY DISEASE INVOLVING NATIVE CORONARY ARTERY OF NATIVE HEART WITHOUT ANGINA PECTORIS: ICD-10-CM

## 2018-01-10 DIAGNOSIS — I50.42 CHRONIC COMBINED SYSTOLIC AND DIASTOLIC HEART FAILURE: Chronic | ICD-10-CM

## 2018-01-10 DIAGNOSIS — I70.0 ATHEROSCLEROSIS OF AORTA: ICD-10-CM

## 2018-01-10 DIAGNOSIS — M1A.0720 IDIOPATHIC CHRONIC GOUT OF LEFT FOOT WITHOUT TOPHUS: ICD-10-CM

## 2018-01-10 DIAGNOSIS — I11.0 HYPERTENSIVE HEART DISEASE WITH HEART FAILURE: Primary | ICD-10-CM

## 2018-01-10 DIAGNOSIS — R73.03 PREDIABETES: ICD-10-CM

## 2018-01-10 DIAGNOSIS — E66.01 SEVERE OBESITY (BMI 35.0-39.9) WITH COMORBIDITY: Chronic | ICD-10-CM

## 2018-01-10 DIAGNOSIS — E78.5 HYPERLIPIDEMIA LDL GOAL <70: ICD-10-CM

## 2018-01-10 DIAGNOSIS — Z12.11 SCREEN FOR COLON CANCER: ICD-10-CM

## 2018-01-10 PROBLEM — M1A.0790 CHRONIC GOUT OF FOOT: Status: RESOLVED | Noted: 2017-10-12 | Resolved: 2018-01-10

## 2018-01-10 PROBLEM — D63.8 ANEMIA OF CHRONIC DISEASE: Status: ACTIVE | Noted: 2017-07-19

## 2018-01-10 PROCEDURE — 99214 OFFICE O/P EST MOD 30 MIN: CPT | Mod: S$GLB,,, | Performed by: INTERNAL MEDICINE

## 2018-01-10 PROCEDURE — 99999 PR PBB SHADOW E&M-EST. PATIENT-LVL IV: CPT | Mod: PBBFAC,,, | Performed by: INTERNAL MEDICINE

## 2018-01-10 RX ORDER — ALLOPURINOL 100 MG/1
100 TABLET ORAL DAILY
Qty: 90 TABLET | Refills: 1 | Status: SHIPPED | OUTPATIENT
Start: 2018-01-10 | End: 2018-08-07 | Stop reason: SDUPTHER

## 2018-01-10 RX ORDER — LIDOCAINE HYDROCHLORIDE 20 MG/ML
SOLUTION ORAL; TOPICAL
COMMUNITY
Start: 2017-10-08 | End: 2018-01-12

## 2018-01-10 NOTE — PROGRESS NOTES
INTERNAL MEDICINE ESTABLISHED PATIENT VISIT NOTE    Subjective:     Chief Complaint: Hand Pain  from carpal tunnel     Patient ID: Audrey Oneil Jr. is a 67 y.o. male with CAD c HF (hx STEMI s/p PCI LAD 2007, most recent echo c sys and diastolic dysfunction c EF 10-15%, s/p AICD c hx VT, recent RHC c normal R and L sided filling pressures, low cardiac output/index, normal pulm pressures) followed by Dr. Marshall/Ricky, HTN, CKD3, obesity c BMI 37, HLD, hx DVT and PE in 2007 (prev completed course of Coumadinn x 1 year per Cards note), GERD, chronic low back pain, anemia, here for f/u.    Last seen by me in Oct.  Since last appt was seen in ED for R Hand pain, was thought to have a thrombus but was then seen by Vasc who noted no thrombus and was discharged.    Has in the interim been seen by Dr. Mayberry, Hand surg who pt reports did imaging and EMG and told he had carpal tunnel.  Currently wearing a brace on his R wrist.  Requesting referral to hand surg here since Dr Mayberry was not covered by his insurance.    Otherwise today is feeling well and is s complaints.  Denies cp or sob.  Denies LE edema.  Denies orthopnea or PND.      Past Medical History:   Diagnosis Date    Chronic anticoagulation 5/5/2016    Chronic combined systolic and diastolic heart failure 11/26/2012    EF 10-20% on ECHO 2013    Clotting disorder     Coronary artery disease involving native coronary artery of native heart without angina pectoris 11/26/2012    Cath 10- Stents patent non-obstructive disease Cath 11-12015 non-obstructive disease     Diverticulosis of colon     DVT (deep venous thrombosis), unspecified laterality 11/12/2015    Essential hypertension 11/15/2015    Hyperlipidemia     Hypertensive heart disease with heart failure 5/5/2016    MI (myocardial infarction) 2009    Nicotine abuse     Obesity 11/26/2012    Pulmonary embolism 2011    Stented coronary artery 11/26/2012    LAD stent placed 10/17/2007       Medication List with Changes/Refills   New Medications    ALLOPURINOL (ZYLOPRIM) 100 MG TABLET    Take 1 tablet (100 mg total) by mouth once daily.   Current Medications    (MAGIC MOUTHWASH) 1:1:1 BENADRYL 12.5MG/5ML LIQ, ALUMINUM & MAGNESIUM HYDROXIDE-SIMEHTICONE (MAALOX), LIDOCAINE VISCOUS 2%    Swish and spit 5 mLs every 4 (four) hours as needed. for mouth sores    AMIODARONE (PACERONE) 200 MG TAB    TAKE 1 AND 1/2 TABLETS(300 MG) BY MOUTH EVERY DAY    AMOXICILLIN-CLAVULANATE 875-125MG (AUGMENTIN) 875-125 MG PER TABLET    TK 1 T PO  BID    ASPIRIN (ECOTRIN) 325 MG EC TABLET    Take 325 mg by mouth once daily.    ATORVASTATIN (LIPITOR) 80 MG TABLET    TAKE 1 TABLET(80 MG) BY MOUTH EVERY DAY    ATORVASTATIN (LIPITOR) 80 MG TABLET    TAKE 1 TABLET(80 MG) BY MOUTH EVERY DAY    CLOPIDOGREL (PLAVIX) 75 MG TABLET    Take 75 mg by mouth once daily.    CLOPIDOGREL (PLAVIX) 75 MG TABLET    TAKE 1 TABLET BY MOUTH EVERY DAY    DEXLANSOPRAZOLE (DEXILANT) 60 MG CAPSULE    Take 60 mg by mouth once daily.    DOCUSATE SODIUM (COLACE) 50 MG CAPSULE    Take 1 capsule (50 mg total) by mouth once daily.    FUROSEMIDE (LASIX) 40 MG TABLET    TAKE 1 TABLET(40 MG) BY MOUTH EVERY DAY    HYDROCODONE-ACETAMINOPHEN 10-325MG (NORCO)  MG TAB    TK 1 T PO Q 8-10 H PRN P    HYDROCODONE-ACETAMINOPHEN 7.5-325MG (NORCO) 7.5-325 MG PER TABLET    1 tablet every 4 (four) hours as needed.     LIDOCAINE VISCOUS 2 % SOLUTION        LISINOPRIL (PRINIVIL,ZESTRIL) 5 MG TABLET    Take 1 tablet (5 mg total) by mouth once daily.    METOPROLOL SUCCINATE (TOPROL-XL) 50 MG 24 HR TABLET    TAKE 1 TABLET BY MOUTH EVERY DAY    METOPROLOL SUCCINATE (TOPROL-XL) 50 MG 24 HR TABLET    TAKE 1 TABLET BY MOUTH EVERY DAY    NITROGLYCERIN (NITROSTAT) 0.4 MG SL TABLET    PLACE 1 TABLET UNDER THE TOUNGE EVERY 5 MINUTES AS NEEDED FOR CHEST PAIN EVERY 3 DOSES/15MIN WITH NO RELIEF CALL 911    OXYCODONE-ACETAMINOPHEN (PERCOCET) 5-325 MG PER TABLET    Take 1 tablet by  mouth every 4 (four) hours as needed for Pain.    SPIRONOLACTONE (ALDACTONE) 25 MG TABLET    TAKE 1 TABLET(25 MG) BY MOUTH EVERY DAY    SPIRONOLACTONE (ALDACTONE) 25 MG TABLET    TAKE 1 TABLET(25 MG) BY MOUTH EVERY DAY    ZOSTAVAX, PF, 19,400 UNIT/0.65 ML INJECTION       Discontinued Medications    COLCHICINE ORAL    Take by mouth.     Review of patient's allergies indicates:   Allergen Reactions    Iodine containing multivitamin     Keflex [cephalexin]     Peaches [peach (prunus persica)]     Shellfish containing products     Tuberculin spenser test ppd     Fig tree Rash     Social History     Social History    Marital status:      Spouse name: N/A    Number of children: 2    Years of education: N/A     Occupational History    NI      unemployed     Social History Main Topics    Smoking status: Former Smoker     Packs/day: 1.00     Years: 45.00     Types: Cigarettes     Quit date: 12/14/2015    Smokeless tobacco: Never Used      Comment: 1-1.5 ppd every day.    Alcohol use No    Drug use: No    Sexual activity: No     Other Topics Concern    Not on file     Social History Narrative    No narrative on file       Review of Systems   Constitutional: Negative for chills, fatigue and fever.   Respiratory: Positive for shortness of breath (at baseline unchanged). Negative for cough and chest tightness.    Cardiovascular: Negative for chest pain.   Gastrointestinal: Negative for abdominal pain and blood in stool.   Genitourinary: Negative for dysuria and frequency.   Musculoskeletal: Positive for arthralgias.   Neurological: Positive for numbness (R hand).         Health Maintenance:     Immunizations:   Influenza vacc t10/2017  TDap is up to date 6/2016  Pneumovax is up to date. 2014, Prevnar 2017  Zostavax 10/2017     Cancer Screening:  Colonoscopy: is not up to date. Ordered prev by other MD but says he was awaiting cards clearance prior to procedure since on asa and plavix; agreed to FIT  "testing now.  PSA 7/2017 wnl    Objective:   BP (!) 142/86 (BP Location: Left arm, Patient Position: Sitting)   Pulse 78   Ht 5' 7" (1.702 m)   Wt 104.9 kg (231 lb 4.2 oz)   SpO2 97%   BMI 36.22 kg/m²        General: AAO x3, no apparent distress  CV: RRR, no m/r/g  Pulm: Lungs CTAB, no crackles, no wheezes  Abd: s/NT/ND +BS  Extremities: no c/c/e    Labs:     Lab Results   Component Value Date    WBC 7.66 11/26/2017    HGB 12.5 (L) 11/26/2017    HCT 38.2 (L) 11/26/2017    MCV 95 11/26/2017     11/26/2017     CMP  Sodium   Date Value Ref Range Status   11/26/2017 140 136 - 145 mmol/L Final     Potassium   Date Value Ref Range Status   11/26/2017 4.1 3.5 - 5.1 mmol/L Final     Chloride   Date Value Ref Range Status   11/26/2017 101 95 - 110 mmol/L Final     CO2   Date Value Ref Range Status   11/26/2017 26 23 - 29 mmol/L Final     Glucose   Date Value Ref Range Status   11/26/2017 104 70 - 110 mg/dL Final     BUN, Bld   Date Value Ref Range Status   11/26/2017 44 (H) 2 - 20 mg/dL Final     Creatinine   Date Value Ref Range Status   11/26/2017 1.84 (H) 0.50 - 1.40 mg/dL Final     Calcium   Date Value Ref Range Status   11/26/2017 8.9 8.7 - 10.5 mg/dL Final     Total Protein   Date Value Ref Range Status   11/26/2017 7.0 6.0 - 8.4 g/dL Final     Albumin   Date Value Ref Range Status   11/26/2017 4.2 3.5 - 5.2 g/dL Final     Total Bilirubin   Date Value Ref Range Status   11/26/2017 0.5 0.1 - 1.0 mg/dL Final     Comment:     For infants and newborns, interpretation of results should be based  on gestational age, weight and in agreement with clinical  observations.  Premature Infant recommended reference ranges:  Up to 24 hours.............<8.0 mg/dL  Up to 48 hours............<12.0 mg/dL  3-5 days..................<15.0 mg/dL  6-29 days.................<15.0 mg/dL       Alkaline Phosphatase   Date Value Ref Range Status   11/26/2017 89 38 - 126 U/L Final     AST   Date Value Ref Range Status   11/26/2017 30 " 15 - 46 U/L Final     ALT   Date Value Ref Range Status   11/26/2017 49 (H) 10 - 44 U/L Final     Anion Gap   Date Value Ref Range Status   11/26/2017 13 8 - 16 mmol/L Final     eGFR if    Date Value Ref Range Status   11/26/2017 43.5 (A) >60 mL/min/1.73 m^2 Final     eGFR if non    Date Value Ref Range Status   11/26/2017 37.6 (A) >60 mL/min/1.73 m^2 Final     Comment:     Calculation used to obtain the estimated glomerular filtration  rate (eGFR) is the CKD-EPI equation.        Lab Results   Component Value Date    LDLCALC 57.6 (L) 07/21/2017     Lab Results   Component Value Date    HGBA1C 6.0 (H) 07/21/2017         Assessment/Plan     Audrey was seen today for hand injury.    Diagnoses and all orders for this visit:    Hypertensive heart disease with heart failure  Just above goal but usually low, takes meds at bedtime.  Will monitor for now and if still high at f/u will titrate meds.    CKD (chronic kidney disease), stage III  Cr usually around 1.7, was 1.84 on most recent labs, cont bp control and monitor.    Chronic combined systolic and diastolic heart failure  Euvolemic, no acute issues, cont meds.    Coronary artery disease involving native coronary artery of native heart without angina pectoris  Atherosclerosis of aorta  Currently s sx, cont bp and lipid control and asa and plavix as per Cards    Screen for colon cancer  -     Fecal Immunochemical Test (iFOBT); Future    Idiopathic chronic gout of left foot without tophus  No recent flares.  Based on elevated uric acid, rec starting allopurinol daily, will send.    Hyperlipidemia LDL goal <70  Lab Results   Component Value Date    LDLCALC 57.6 (L) 07/21/2017     At goal, cont meds, on lipitor 80    Severe obesity (BMI 35.0-39.9) with comorbidity  Counseled extensively on need for diet changes and daily exercise.  Discussed examples of healthy eating for breakfast, lunch, and dinner.  Recommend at least 30 minutes of  exercise at least 5 days a week.      Carpal tunnel syndrome on right  As per HPI, records from Dr. Mayberry requested  -     Ambulatory Referral to Orthopedics    Elevated ALT  Mild, noted on recent labs, has f/u labs c cards soon, if going up will need additional w/u    Prediabetes  Pt was counseled on the need to avoid intake of simple sugars and minimize carbohydrates.  Also recommended weight loss and daily exercise.  Repeat labs at f/u.      HM as above  RTC in 4 mos for f/u    January Khan MD  Department of Internal Medicine - [facility]  01/10/2018

## 2018-01-11 ENCOUNTER — CLINICAL SUPPORT (OUTPATIENT)
Dept: ELECTROPHYSIOLOGY | Facility: CLINIC | Age: 68
End: 2018-01-11
Payer: MEDICARE

## 2018-01-11 DIAGNOSIS — I42.9 CARDIOMYOPATHY: ICD-10-CM

## 2018-01-11 DIAGNOSIS — Z95.810 IMPLANTABLE CARDIOVERTER-DEFIBRILLATOR (ICD) IN SITU: ICD-10-CM

## 2018-01-11 PROCEDURE — 93296 REM INTERROG EVL PM/IDS: CPT | Mod: NTX,S$GLB,, | Performed by: INTERNAL MEDICINE

## 2018-01-11 PROCEDURE — 93295 DEV INTERROG REMOTE 1/2/MLT: CPT | Mod: NTX,S$GLB,, | Performed by: INTERNAL MEDICINE

## 2018-01-12 ENCOUNTER — LAB VISIT (OUTPATIENT)
Dept: LAB | Facility: HOSPITAL | Age: 68
End: 2018-01-12
Attending: INTERNAL MEDICINE
Payer: MEDICARE

## 2018-01-12 ENCOUNTER — OFFICE VISIT (OUTPATIENT)
Dept: TRANSPLANT | Facility: CLINIC | Age: 68
End: 2018-01-12
Payer: MEDICARE

## 2018-01-12 VITALS
OXYGEN SATURATION: 97 % | HEIGHT: 67 IN | BODY MASS INDEX: 36.61 KG/M2 | DIASTOLIC BLOOD PRESSURE: 50 MMHG | WEIGHT: 233.25 LBS | HEART RATE: 64 BPM | SYSTOLIC BLOOD PRESSURE: 68 MMHG

## 2018-01-12 DIAGNOSIS — I10 ESSENTIAL HYPERTENSION: ICD-10-CM

## 2018-01-12 DIAGNOSIS — N18.30 CKD (CHRONIC KIDNEY DISEASE), STAGE III: ICD-10-CM

## 2018-01-12 DIAGNOSIS — I25.5 CARDIOMYOPATHY, ISCHEMIC: Chronic | ICD-10-CM

## 2018-01-12 DIAGNOSIS — I50.42 CHRONIC COMBINED SYSTOLIC AND DIASTOLIC HEART FAILURE: Chronic | ICD-10-CM

## 2018-01-12 DIAGNOSIS — Z95.810 AICD (AUTOMATIC CARDIOVERTER/DEFIBRILLATOR) PRESENT: ICD-10-CM

## 2018-01-12 DIAGNOSIS — I50.42 CHRONIC COMBINED SYSTOLIC AND DIASTOLIC HEART FAILURE: Primary | Chronic | ICD-10-CM

## 2018-01-12 DIAGNOSIS — I25.10 CORONARY ARTERY DISEASE INVOLVING NATIVE CORONARY ARTERY OF NATIVE HEART WITHOUT ANGINA PECTORIS: ICD-10-CM

## 2018-01-12 DIAGNOSIS — E78.5 HYPERLIPIDEMIA LDL GOAL <70: ICD-10-CM

## 2018-01-12 LAB
ALBUMIN SERPL BCP-MCNC: 3.7 G/DL
ALP SERPL-CCNC: 75 U/L
ALT SERPL W/O P-5'-P-CCNC: 37 U/L
ANION GAP SERPL CALC-SCNC: 9 MMOL/L
AST SERPL-CCNC: 24 U/L
BASOPHILS # BLD AUTO: 0.05 K/UL
BASOPHILS NFR BLD: 0.7 %
BILIRUB SERPL-MCNC: 0.5 MG/DL
BNP SERPL-MCNC: 44 PG/ML
BUN SERPL-MCNC: 41 MG/DL
CALCIUM SERPL-MCNC: 8.9 MG/DL
CHLORIDE SERPL-SCNC: 104 MMOL/L
CO2 SERPL-SCNC: 27 MMOL/L
CREAT SERPL-MCNC: 1.7 MG/DL
DIFFERENTIAL METHOD: ABNORMAL
EOSINOPHIL # BLD AUTO: 0.1 K/UL
EOSINOPHIL NFR BLD: 1.8 %
ERYTHROCYTE [DISTWIDTH] IN BLOOD BY AUTOMATED COUNT: 13.2 %
EST. GFR  (AFRICAN AMERICAN): 47.9 ML/MIN/1.73 M^2
EST. GFR  (NON AFRICAN AMERICAN): 41.4 ML/MIN/1.73 M^2
GLUCOSE SERPL-MCNC: 98 MG/DL
HCT VFR BLD AUTO: 41.7 %
HGB BLD-MCNC: 13.6 G/DL
IMM GRANULOCYTES # BLD AUTO: 0.05 K/UL
IMM GRANULOCYTES NFR BLD AUTO: 0.7 %
LYMPHOCYTES # BLD AUTO: 2.2 K/UL
LYMPHOCYTES NFR BLD: 29.9 %
MCH RBC QN AUTO: 30.9 PG
MCHC RBC AUTO-ENTMCNC: 32.6 G/DL
MCV RBC AUTO: 95 FL
MONOCYTES # BLD AUTO: 0.6 K/UL
MONOCYTES NFR BLD: 7.9 %
NEUTROPHILS # BLD AUTO: 4.4 K/UL
NEUTROPHILS NFR BLD: 59 %
NRBC BLD-RTO: 0 /100 WBC
PLATELET # BLD AUTO: 260 K/UL
PMV BLD AUTO: 9.6 FL
POTASSIUM SERPL-SCNC: 4.8 MMOL/L
PROT SERPL-MCNC: 6.7 G/DL
RBC # BLD AUTO: 4.4 M/UL
SODIUM SERPL-SCNC: 140 MMOL/L
WBC # BLD AUTO: 7.38 K/UL

## 2018-01-12 PROCEDURE — 99214 OFFICE O/P EST MOD 30 MIN: CPT | Mod: NTX,S$GLB,, | Performed by: INTERNAL MEDICINE

## 2018-01-12 PROCEDURE — 80053 COMPREHEN METABOLIC PANEL: CPT | Mod: TXP

## 2018-01-12 PROCEDURE — 83880 ASSAY OF NATRIURETIC PEPTIDE: CPT | Mod: TXP

## 2018-01-12 PROCEDURE — 85025 COMPLETE CBC W/AUTO DIFF WBC: CPT | Mod: TXP

## 2018-01-12 PROCEDURE — 99499 UNLISTED E&M SERVICE: CPT | Mod: S$GLB,,, | Performed by: INTERNAL MEDICINE

## 2018-01-12 PROCEDURE — 99999 PR PBB SHADOW E&M-EST. PATIENT-LVL III: CPT | Mod: PBBFAC,TXP,, | Performed by: INTERNAL MEDICINE

## 2018-01-12 PROCEDURE — 36415 COLL VENOUS BLD VENIPUNCTURE: CPT | Mod: TXP

## 2018-01-12 NOTE — Clinical Note
January 12, 2018        Stephanie Jade             [facility]  [address]  [facility] [address]  Phone: [phone]   Patient: Audrey Oneil Jr.   MR Number: [id number]   YOB: 1950   Date of Visit: 1/12/2018       Dear Dr. Stephanie Jade    Thank you for referring Audrey Oneil to me for evaluation. Attached you will find relevant portions of my assessment and plan of care.    If you have questions, please do not hesitate to call me. I look forward to following Audrey Oneil along with you.    Sincerely,    Edison Marshall MD    Enclosure    If you would like to receive this communication electronically, please contact [email or web address] or [phone] to request [employer] access.    [employer] is a tool which provides read-only access to select patient information with whom you have a relationship. Its easy to use and provides real time access to review your patients record including encounter summaries, notes, results, and demographic information.    If you feel you have received this communication in error or would no longer like to receive these types of communications, please e-mail [email or web address]

## 2018-01-12 NOTE — PROGRESS NOTES
Subjective:     HPI:  Mr. Oneil is a very pleasant 67 y.o. male with a hx of CAD s/p STEMI (s/p PCI LAD 2007), CHF/ICM and remote MI, quit smoking Dec 2015,  PE (2010, s/p 1-yr coumadin), HTN, DLP and s/p ICD [employer] placement due to VT who comes for a regular follow-up. I saw him back in 6/12/17 and had RHC 7/10/17 (see below). He has been declining over the last several months and most recent CPX (2/15/17) that showed low Peak VO2 of 7.5ml/kg/min but AT was not attained and RER was only 0.67 (poor effort). Most recent 2D echo showed severely depressed LV function with an EF=10-15%, LV with a LVEDD of 5.9  cm, normal RV size and function. Clinically reports NYHA class II-III symptoms.  RHC done showed normal left and right sided filling presssures. Low normal CO/CI. Today he is doing well. NYHA Class II. NO PND or orthopnea. No HF readmissions and no cardiopulmonary complaints tdoay. Labs are pending.      RHC 7/2017:  RA: 5/4 (1)  RV: 31/-6  PW:  (6)  PA: 32/2 (15)  AO: 120/64 (91)  PA_SAT: 64    CONDITION 1:  FICKCI: 2.1400  FICKCO: 4.6600    Past Medical History:   Diagnosis Date    Chronic anticoagulation 5/5/2016    Chronic combined systolic and diastolic heart failure 11/26/2012    EF 10-20% on ECHO 2013    Clotting disorder     Coronary artery disease involving native coronary artery of native heart without angina pectoris 11/26/2012    Cath 10- Stents patent non-obstructive disease Cath 11-12015 non-obstructive disease     Diverticulosis of colon     DVT (deep venous thrombosis), unspecified laterality 11/12/2015    Essential hypertension 11/15/2015    Hyperlipidemia     Hypertensive heart disease with heart failure 5/5/2016    MI (myocardial infarction) 2009    Nicotine abuse     Obesity 11/26/2012    Pulmonary embolism 2011    Stented coronary artery 11/26/2012    LAD stent placed 10/17/2007      Past Surgical History:   Procedure Laterality Date    APPENDECTOMY      BACK SURGERY   "    CARDIAC SURGERY      stent    r knee scope      SPINE SURGERY      TONSILLECTOMY         Review of Systems   Constitution: Negative. Negative for chills, decreased appetite, diaphoresis, fever, weakness, malaise/fatigue, night sweats, weight gain and weight loss.   Eyes: Negative.    Cardiovascular: Positive for dyspnea on exertion. Negative for chest pain, claudication, cyanosis, irregular heartbeat, leg swelling, near-syncope, orthopnea, palpitations, paroxysmal nocturnal dyspnea and syncope.   Respiratory: Negative for cough, hemoptysis and shortness of breath.    Endocrine: Negative.    Hematologic/Lymphatic: Negative.    Skin: Negative for color change, dry skin and nail changes.   Musculoskeletal: Negative.    Gastrointestinal: Negative.    Genitourinary: Negative.        Objective:   Blood pressure (!) 68/50, pulse 64, height 5' 7" (1.702 m), weight 105.8 kg (233 lb 4 oz), SpO2 97 %.body mass index is 36.53 kg/m².  Physical Exam   Constitutional: He appears well-developed.   BP (!) 68/50 (BP Location: Left arm, Patient Position: Sitting, BP Method: Large (Automatic))   Pulse 64   Ht 5' 7" (1.702 m)   Wt 105.8 kg (233 lb 4 oz)   SpO2 97%   BMI 36.53 kg/m²      HENT:   Head: Normocephalic.   Neck: No JVD present. Carotid bruit is not present.   Cardiovascular: Regular rhythm and normal pulses.  PMI is displaced.  Exam reveals no gallop and no S3.    No murmur heard.  Pulmonary/Chest: Effort normal and breath sounds normal. No respiratory distress. He has no wheezes. He has no rales.   Abdominal: Soft. Bowel sounds are normal. He exhibits no distension. There is no tenderness. There is no rebound.   Musculoskeletal: He exhibits no edema.   Neurological: He is alert.   Skin: Skin is warm.   Vitals reviewed.      Labs:    Chemistry        Component Value Date/Time     11/26/2017 0024    K 4.1 11/26/2017 0024     11/26/2017 0024    CO2 26 11/26/2017 0024    BUN 44 (H) 11/26/2017 0024    " CREATININE 1.84 (H) 11/26/2017 0024     11/26/2017 0024        Component Value Date/Time    CALCIUM 8.9 11/26/2017 0024    ALKPHOS 89 11/26/2017 0024    AST 30 11/26/2017 0024    ALT 49 (H) 11/26/2017 0024    BILITOT 0.5 11/26/2017 0024    ESTGFRAFRICA 43.5 (A) 11/26/2017 0024    EGFRNONAA 37.6 (A) 11/26/2017 0024          Magnesium   Date Value Ref Range Status   08/11/2017 2.2 1.6 - 2.6 mg/dL Final     Lab Results   Component Value Date    WBC 7.66 11/26/2017    HGB 12.5 (L) 11/26/2017    HCT 38.2 (L) 11/26/2017     11/26/2017     Lab Results   Component Value Date    INR 0.9 11/26/2017    INR 0.9 08/11/2017    INR 0.9 07/10/2017     BNP   Date Value Ref Range Status   09/12/2017 64 0 - 99 pg/mL Final     Comment:     Values of less than 100 pg/ml are consistent with non-CHF populations.   06/12/2017 57 0 - 99 pg/mL Final     Comment:     Values of less than 100 pg/ml are consistent with non-CHF populations.   04/14/2017 81 0 - 99 pg/mL Final     Comment:     Values of less than 100 pg/ml are consistent with non-CHF populations.     No results found for: LDH  No results found for this or any previous visit.  No results found for this or any previous visit.    Assessment:      1. Chronic combined systolic and diastolic heart failure    2. Coronary artery disease involving native coronary artery of native heart without angina pectoris    3. Cardiomyopathy, ischemic    4. AICD (automatic cardioverter/defibrillator) present    5. Essential hypertension    6. Hyperlipidemia LDL goal <70    7. CKD (chronic kidney disease), stage III        Plan:   67 y/o male with ICMP, HFrEF stage D with NYHA class II symptoms  Continue current HF regimen  Recommend 2 gram sodium restriction and 1500cc fluid restriction.  Encourage physical activity with graded exercise program.  Requested patient to weigh themselves daily, and to notify us if their weight increases by more than 3 lbs in 1 day or 5 lbs in 1 week.   RTC in  3 months with labs     Edison Marshall MD

## 2018-01-14 DIAGNOSIS — Z95.810 AICD (AUTOMATIC CARDIOVERTER/DEFIBRILLATOR) PRESENT: Primary | ICD-10-CM

## 2018-01-14 DIAGNOSIS — I25.5 ISCHEMIC CARDIOMYOPATHY: ICD-10-CM

## 2018-01-27 ENCOUNTER — HOSPITAL ENCOUNTER (EMERGENCY)
Facility: HOSPITAL | Age: 68
Discharge: HOME | End: 2018-01-27
Attending: EMERGENCY MEDICINE
Payer: MEDICARE

## 2018-01-27 VITALS
BODY MASS INDEX: 36.29 KG/M2 | RESPIRATION RATE: 20 BRPM | HEIGHT: 67 IN | SYSTOLIC BLOOD PRESSURE: 100 MMHG | DIASTOLIC BLOOD PRESSURE: 54 MMHG | OXYGEN SATURATION: 98 % | TEMPERATURE: 98 F | WEIGHT: 231.25 LBS | HEART RATE: 60 BPM

## 2018-01-27 DIAGNOSIS — R51.9 NONINTRACTABLE HEADACHE, UNSPECIFIED CHRONICITY PATTERN, UNSPECIFIED HEADACHE TYPE: ICD-10-CM

## 2018-01-27 DIAGNOSIS — M54.2 NECK PAIN: Primary | ICD-10-CM

## 2018-01-27 LAB
ALBUMIN SERPL BCP-MCNC: 3.7 G/DL
ALP SERPL-CCNC: 86 U/L
ALT SERPL W/O P-5'-P-CCNC: 31 U/L
ANION GAP SERPL CALC-SCNC: 8 MMOL/L
AST SERPL-CCNC: 20 U/L
BILIRUB SERPL-MCNC: 0.6 MG/DL
BUN SERPL-MCNC: 41 MG/DL (ref 6–30)
BUN SERPL-MCNC: 42 MG/DL
CALCIUM SERPL-MCNC: 9 MG/DL
CHLORIDE SERPL-SCNC: 103 MMOL/L (ref 95–110)
CHLORIDE SERPL-SCNC: 105 MMOL/L
CO2 SERPL-SCNC: 26 MMOL/L
CREAT SERPL-MCNC: 2 MG/DL
CREAT SERPL-MCNC: 2.2 MG/DL (ref 0.5–1.4)
EST. GFR  (AFRICAN AMERICAN): 39.3 ML/MIN/1.73 M^2
EST. GFR  (NON AFRICAN AMERICAN): 34 ML/MIN/1.73 M^2
GLUCOSE SERPL-MCNC: 103 MG/DL
GLUCOSE SERPL-MCNC: 104 MG/DL (ref 70–110)
HCT VFR BLD CALC: 38 %PCV (ref 36–54)
POC IONIZED CALCIUM: 1.1 MMOL/L (ref 1.06–1.42)
POC TCO2 (MEASURED): 28 MMOL/L (ref 23–29)
POTASSIUM BLD-SCNC: 4.7 MMOL/L (ref 3.5–5.1)
POTASSIUM SERPL-SCNC: 4.6 MMOL/L
PROT SERPL-MCNC: 6.6 G/DL
SAMPLE: ABNORMAL
SODIUM BLD-SCNC: 140 MMOL/L (ref 136–145)
SODIUM SERPL-SCNC: 139 MMOL/L

## 2018-01-27 PROCEDURE — 96361 HYDRATE IV INFUSION ADD-ON: CPT | Mod: NTX

## 2018-01-27 PROCEDURE — 63600175 PHARM REV CODE 636 W HCPCS: Mod: NTX | Performed by: EMERGENCY MEDICINE

## 2018-01-27 PROCEDURE — 99283 EMERGENCY DEPT VISIT LOW MDM: CPT | Mod: NTX,,, | Performed by: EMERGENCY MEDICINE

## 2018-01-27 PROCEDURE — 96374 THER/PROPH/DIAG INJ IV PUSH: CPT | Mod: NTX

## 2018-01-27 PROCEDURE — 80053 COMPREHEN METABOLIC PANEL: CPT | Mod: NTX

## 2018-01-27 PROCEDURE — 25500020 PHARM REV CODE 255: Mod: NTX | Performed by: EMERGENCY MEDICINE

## 2018-01-27 PROCEDURE — 25000003 PHARM REV CODE 250: Mod: NTX | Performed by: EMERGENCY MEDICINE

## 2018-01-27 PROCEDURE — 99284 EMERGENCY DEPT VISIT MOD MDM: CPT | Mod: 25,NTX

## 2018-01-27 RX ORDER — DIPHENHYDRAMINE HYDROCHLORIDE 50 MG/ML
25 INJECTION INTRAMUSCULAR; INTRAVENOUS
Status: COMPLETED | OUTPATIENT
Start: 2018-01-27 | End: 2018-01-27

## 2018-01-27 RX ORDER — HYDROCODONE BITARTRATE AND ACETAMINOPHEN 5; 325 MG/1; MG/1
1 TABLET ORAL
Status: COMPLETED | OUTPATIENT
Start: 2018-01-27 | End: 2018-01-27

## 2018-01-27 RX ORDER — METHOCARBAMOL 500 MG/1
500 TABLET, FILM COATED ORAL 3 TIMES DAILY
Qty: 20 TABLET | Refills: 0 | Status: SHIPPED | OUTPATIENT
Start: 2018-01-27 | End: 2018-02-03

## 2018-01-27 RX ORDER — METHOCARBAMOL 500 MG/1
500 TABLET, FILM COATED ORAL
Status: COMPLETED | OUTPATIENT
Start: 2018-01-27 | End: 2018-01-27

## 2018-01-27 RX ADMIN — METHOCARBAMOL 500 MG: 500 TABLET ORAL at 08:01

## 2018-01-27 RX ADMIN — HYDROCODONE BITARTRATE AND ACETAMINOPHEN 1 TABLET: 5; 325 TABLET ORAL at 08:01

## 2018-01-27 RX ADMIN — DIPHENHYDRAMINE HYDROCHLORIDE 25 MG: 50 INJECTION INTRAMUSCULAR; INTRAVENOUS at 10:01

## 2018-01-27 RX ADMIN — SODIUM CHLORIDE 1000 ML: 0.9 INJECTION, SOLUTION INTRAVENOUS at 08:01

## 2018-01-27 RX ADMIN — IOHEXOL 100 ML: 350 INJECTION, SOLUTION INTRAVENOUS at 10:01

## 2018-01-27 NOTE — ED PROVIDER NOTES
Encounter Date: 1/27/2018       History     Chief Complaint   Patient presents with    Headache     Headache, left sided neck pain and pain while swallowing x 2 days. No other symptoms     HPI   67-year-old male with significant vasculopath history presents for evaluation of 2 days of left-sided neck pain and occipital headache.  Patient reports symptoms began and have been unrelieved with any home medications have been constant since their onset.  Left-sided neck pain radiates to the occiput as well as to the right neck.  Patient also complains of decreased range of motion with limited ability to turn his head to the left.  Denies any other complaints.    Review of patient's allergies indicates:   Allergen Reactions    Iodine containing multivitamin     Keflex [cephalexin]     Peaches [peach (prunus persica)]     Shellfish containing products     Tuberculin spenser test ppd     Fig tree Rash     Past Medical History:   Diagnosis Date    Chronic anticoagulation 5/5/2016    Chronic combined systolic and diastolic heart failure 11/26/2012    EF 10-20% on ECHO 2013    Clotting disorder     Coronary artery disease involving native coronary artery of native heart without angina pectoris 11/26/2012    Cath 10- Stents patent non-obstructive disease Cath 11-12015 non-obstructive disease     Diverticulosis of colon     DVT (deep venous thrombosis), unspecified laterality 11/12/2015    Essential hypertension 11/15/2015    Hyperlipidemia     Hypertensive heart disease with heart failure 5/5/2016    MI (myocardial infarction) 2009    Nicotine abuse     Obesity 11/26/2012    Pulmonary embolism 2011    Stented coronary artery 11/26/2012    LAD stent placed 10/17/2007      Past Surgical History:   Procedure Laterality Date    APPENDECTOMY      BACK SURGERY      CARDIAC SURGERY      stent    r knee scope      SPINE SURGERY      TONSILLECTOMY       Family History   Problem Relation Age of Onset     Cancer Mother      colon cancer    Heart disease Mother     Diabetes Mother     Heart disease Father     Stroke Father     Colon polyps Father     Cancer Paternal Grandmother      leukemia    No Known Problems Sister     No Known Problems Daughter     No Known Problems Son     No Known Problems Sister     No Known Problems Son      Social History   Substance Use Topics    Smoking status: Former Smoker     Packs/day: 1.00     Years: 45.00     Types: Cigarettes     Quit date: 12/14/2015    Smokeless tobacco: Never Used      Comment: 1-1.5 ppd every day.    Alcohol use No     Review of Systems   Constitutional: Negative for chills, diaphoresis and fever.   HENT: Positive for sore throat and trouble swallowing. Negative for drooling (or other inability to handle secretions).    Eyes: Negative for pain and visual disturbance.   Respiratory: Negative for cough and shortness of breath.    Cardiovascular: Negative for chest pain and palpitations.   Gastrointestinal: Negative for abdominal pain, nausea and vomiting.   Genitourinary: Negative for dysuria and flank pain.   Musculoskeletal: Positive for neck pain and neck stiffness. Negative for back pain and gait problem.   Skin: Negative for rash and wound.   Neurological: Negative for dizziness, facial asymmetry, speech difficulty, weakness, light-headedness, numbness and headaches.       Physical Exam     Initial Vitals [01/27/18 0424]   BP Pulse Resp Temp SpO2   (!) 125/56 65 16 97.7 °F (36.5 °C) 99 %      MAP       79         Physical Exam    Nursing note and vitals reviewed.  Constitutional: He is not diaphoretic. No distress.   HENT:   Head: Normocephalic and atraumatic.   Mouth/Throat: Oropharynx is clear and moist.   Eyes: Conjunctivae and EOM are normal. Pupils are equal, round, and reactive to light.   Neck: Neck supple.   Patient has decreased range of motion secondary to pain.  He cannot turn his head more than about 20° past midline to the left.  He  has no tenderness to palpation however.   Cardiovascular: Normal rate, regular rhythm, normal heart sounds and intact distal pulses.   Pulmonary/Chest: No respiratory distress. He has no wheezes.   Abdominal: Soft. There is no tenderness.   Musculoskeletal: He exhibits no edema or tenderness.   Neurological: He is alert and oriented to person, place, and time. He has normal strength. No cranial nerve deficit or sensory deficit. He exhibits normal muscle tone. Coordination and gait normal.   Skin: Skin is warm and dry. No rash noted.         ED Course   Procedures  Labs Reviewed   ISTAT CHEM8          II MDM    DDx includes but is not limited to: CVA, dissection, meningitis/encephalitis, occult trauma, musculoskeletal.  A/P:  Pt is a 67-year-old male with 2 days of left neck and occipital headache..  Signs and symptoms consistent with musculoskeletal pain.  However, given patient's extensive medical history a serious etiology such as a dissection in the posterior circulation cannot be ruled out.  We'll order basic labs as well as CTA of the head and neck.  We'll also administer Norco and Robaxin for pain relief.  Dispo pending , labs/imaging and reassessment.  Case Discussed with Dr. Juan LOVELACE  01/27/2018 9:16 AM          UPDATE  Pt's GFR 32, can receive contrast per CT.  He has received 400 cc fluids.  Will re-assess after CT as pt does have significant HF and cannot receive full bolus  Eryn LOVELACE  01/27/2018 9:17 AM         UPDATE  Pt with moderate symptom improvement  CTA neg for acute processes   Advised to f/u with ENT for continued sore throat and to f/u with PCP on Monday for re-eval and possible repeat testing  Eryn LOVELACE  01/27/2018 11:29 AM                     Attending Attestation:   Physician Attestation Statement for Resident:  As the supervising MD   Physician Attestation Statement: I have personally seen and examined this patient.   I agree with the  above history. -: 67-year-old man complains of neck pain.  Left greater than right.  Also complains of painful swallowing that was worse last night.  This morning he is having difficulty moving his neck because of the pain.  The pain radiates to his occiput and is causing a headache.  Denies any fevers or chills.   As the supervising MD I agree with the above PE.   -: Patient has some tenderness at the base of his occiput where his sternocleidomastoid muscles insert.  Pain is reproduced with turning the head from side to side and flexion of the neck as well as palpating these muscles.  On examination of the oropharynx there is no erythema, edema, or exudate.  Normal voice.  No submental tenderness or swelling.   As the supervising MD I agree with the above treatment, course, plan, and disposition.   -: CT scan of the neck showed no acute abnormality.  Patient was given a dose of Robaxin as well as his normal dose of Norco and he felt some improvement in his pain.  He was able to move his neck better.  Without fever, infection is less likely.  No evidence of pharyngeal or laryngeal edema on CT scan and no evidence of dissection on CTA.  I advised the patient to follow-up with his PCP this week for close follow-up and to return to the emergency department should his sore throat worsen.  This could possibly be viral pharyngitis with musculoskeletal neck pain.  Unless concerned about retropharyngeal abscess with a normal CT scan of the throat.  Did advise the patient with unclear diagnosis he needs close follow-up.  Recommended he follow-up with ENT and his PCP this week and to return to the emergency department for worsening.                    ED Course      Clinical Impression:   The primary encounter diagnosis was Neck pain. A diagnosis of Nonintractable headache, unspecified chronicity pattern, unspecified headache type was also pertinent to this visit.                           Eryn Isaac,  MD  Resident  01/27/18 1712       Mechelle Guido MD  01/28/18 0165

## 2018-01-27 NOTE — ED TRIAGE NOTES
Presents to ER with sore throat, headache and neck pain for 2 days.    Pt identifiers checked and correct  LOC: The patient is awake, alert, aware of environment with an appropriate affect. Oriented x3, speaking appropriately  APPEARANCE: Pt resting comfortably, in no acute distress, pt is clean and well groomed, clothing properly fastened  SKIN: Skin warm, dry and intact, normal skin turgor, moist mucus membranes  RESPIRATORY: Airway is open and patent, respirations are spontaneous, even and unlabored, normal effort and rate  MUSCULOSKELETAL: No obvious deformities.

## 2018-01-28 ENCOUNTER — HOSPITAL ENCOUNTER (EMERGENCY)
Facility: HOSPITAL | Age: 68
Discharge: HOME | End: 2018-01-28
Attending: FAMILY MEDICINE
Payer: MEDICARE

## 2018-01-28 VITALS
DIASTOLIC BLOOD PRESSURE: 60 MMHG | OXYGEN SATURATION: 97 % | HEART RATE: 60 BPM | SYSTOLIC BLOOD PRESSURE: 104 MMHG | RESPIRATION RATE: 18 BRPM | BODY MASS INDEX: 31.29 KG/M2 | TEMPERATURE: 98 F | HEIGHT: 72 IN | WEIGHT: 231 LBS

## 2018-01-28 DIAGNOSIS — M54.12 CERVICAL RADICULAR PAIN: Primary | ICD-10-CM

## 2018-01-28 DIAGNOSIS — M54.2 NECK PAIN ON RIGHT SIDE: ICD-10-CM

## 2018-01-28 PROBLEM — R49.0 DYSPHONIA: Status: ACTIVE | Noted: 2018-01-28

## 2018-01-28 LAB
BASOPHILS # BLD AUTO: 0.06 K/UL
BASOPHILS NFR BLD: 0.8 %
CRP SERPL-MCNC: 10.7 MG/L
DIFFERENTIAL METHOD: ABNORMAL
EOSINOPHIL # BLD AUTO: 0.2 K/UL
EOSINOPHIL NFR BLD: 1.9 %
ERYTHROCYTE [DISTWIDTH] IN BLOOD BY AUTOMATED COUNT: 13.3 %
ERYTHROCYTE [SEDIMENTATION RATE] IN BLOOD BY WESTERGREN METHOD: 26 MM/HR
HCT VFR BLD AUTO: 38.1 %
HGB BLD-MCNC: 12.7 G/DL
IMM GRANULOCYTES # BLD AUTO: 0.04 K/UL
IMM GRANULOCYTES NFR BLD AUTO: 0.5 %
LYMPHOCYTES # BLD AUTO: 2.1 K/UL
LYMPHOCYTES NFR BLD: 26.4 %
MCH RBC QN AUTO: 31.5 PG
MCHC RBC AUTO-ENTMCNC: 33.3 G/DL
MCV RBC AUTO: 95 FL
MONOCYTES # BLD AUTO: 0.8 K/UL
MONOCYTES NFR BLD: 9.4 %
NEUTROPHILS # BLD AUTO: 4.9 K/UL
NEUTROPHILS NFR BLD: 61 %
NRBC BLD-RTO: 0 /100 WBC
PLATELET # BLD AUTO: 265 K/UL
PMV BLD AUTO: 9.9 FL
RBC # BLD AUTO: 4.03 M/UL
WBC # BLD AUTO: 7.96 K/UL

## 2018-01-28 PROCEDURE — 85025 COMPLETE CBC W/AUTO DIFF WBC: CPT | Mod: NTX

## 2018-01-28 PROCEDURE — 99284 EMERGENCY DEPT VISIT MOD MDM: CPT | Mod: NTX,,, | Performed by: FAMILY MEDICINE

## 2018-01-28 PROCEDURE — 36000 PLACE NEEDLE IN VEIN: CPT | Mod: NTX,59

## 2018-01-28 PROCEDURE — 25000003 PHARM REV CODE 250: Mod: NTX | Performed by: FAMILY MEDICINE

## 2018-01-28 PROCEDURE — 99284 EMERGENCY DEPT VISIT MOD MDM: CPT | Mod: 25,NTX

## 2018-01-28 PROCEDURE — 85651 RBC SED RATE NONAUTOMATED: CPT | Mod: NTX

## 2018-01-28 PROCEDURE — 86140 C-REACTIVE PROTEIN: CPT | Mod: NTX

## 2018-01-28 PROCEDURE — 99499 UNLISTED E&M SERVICE: CPT | Mod: NTX,,, | Performed by: OTOLARYNGOLOGY

## 2018-01-28 PROCEDURE — 31575 DIAGNOSTIC LARYNGOSCOPY: CPT

## 2018-01-28 RX ORDER — DIAZEPAM 5 MG/1
5-10 TABLET ORAL EVERY 8 HOURS PRN
Qty: 24 TABLET | Refills: 0 | Status: SHIPPED | OUTPATIENT
Start: 2018-01-28 | End: 2018-04-05

## 2018-01-28 RX ORDER — TIZANIDINE 2 MG/1
4 TABLET ORAL
Status: COMPLETED | OUTPATIENT
Start: 2018-01-28 | End: 2018-01-28

## 2018-01-28 RX ADMIN — TIZANIDINE 4 MG: 2 TABLET ORAL at 04:01

## 2018-01-28 NOTE — SUBJECTIVE & OBJECTIVE
Medications:  Continuous Infusions:  Scheduled Meds:  PRN Meds:     No current facility-administered medications on file prior to encounter.      Current Outpatient Prescriptions on File Prior to Encounter   Medication Sig    allopurinol (ZYLOPRIM) 100 MG tablet Take 1 tablet (100 mg total) by mouth once daily.    amiodarone (PACERONE) 200 MG Tab TAKE 1 AND 1/2 TABLETS(300 MG) BY MOUTH EVERY DAY    aspirin (ECOTRIN) 325 MG EC tablet Take 325 mg by mouth once daily.    atorvastatin (LIPITOR) 80 MG tablet TAKE 1 TABLET(80 MG) BY MOUTH EVERY DAY    clopidogrel (PLAVIX) 75 mg tablet Take 75 mg by mouth once daily.    docusate sodium (COLACE) 50 MG capsule Take 1 capsule (50 mg total) by mouth once daily.    furosemide (LASIX) 40 MG tablet TAKE 1 TABLET(40 MG) BY MOUTH EVERY DAY    hydrocodone-acetaminophen 10-325mg (NORCO)  mg Tab TK 1 T PO Q 8-10 H PRN P    lisinopril (PRINIVIL,ZESTRIL) 5 MG tablet Take 1 tablet (5 mg total) by mouth once daily.    metoprolol succinate (TOPROL-XL) 50 MG 24 hr tablet TAKE 1 TABLET BY MOUTH EVERY DAY    spironolactone (ALDACTONE) 25 MG tablet TAKE 1 TABLET(25 MG) BY MOUTH EVERY DAY    methocarbamol (ROBAXIN) 500 MG Tab Take 1 tablet (500 mg total) by mouth 3 (three) times daily.       Review of patient's allergies indicates:   Allergen Reactions    Iodine containing multivitamin     Keflex [cephalexin]     Peaches [peach (prunus persica)]     Shellfish containing products     Tuberculin spenser test ppd     Fig tree Rash       Past Medical History:   Diagnosis Date    Chronic anticoagulation 5/5/2016    Chronic combined systolic and diastolic heart failure 11/26/2012    EF 10-20% on ECHO 2013    Clotting disorder     Coronary artery disease involving native coronary artery of native heart without angina pectoris 11/26/2012    Cath 10- Stents patent non-obstructive disease Cath 11-12015 non-obstructive disease     Diverticulosis of colon     DVT (deep  venous thrombosis), unspecified laterality 11/12/2015    Essential hypertension 11/15/2015    Hyperlipidemia     Hypertensive heart disease with heart failure 5/5/2016    MI (myocardial infarction) 2009    Nicotine abuse     Obesity 11/26/2012    Pulmonary embolism 2011    Stented coronary artery 11/26/2012    LAD stent placed 10/17/2007      Past Surgical History:   Procedure Laterality Date    APPENDECTOMY      BACK SURGERY      CARDIAC SURGERY      stent    r knee scope      SPINE SURGERY      TONSILLECTOMY       Family History     Problem Relation (Age of Onset)    Cancer Mother, Paternal Grandmother    Colon polyps Father    Diabetes Mother    Heart disease Mother, Father    No Known Problems Sister, Daughter, Son, Sister, Son    Stroke Father        Social History Main Topics    Smoking status: Former Smoker     Packs/day: 1.00     Years: 45.00     Types: Cigarettes     Quit date: 12/14/2015    Smokeless tobacco: Never Used      Comment: 1-1.5 ppd every day.    Alcohol use No    Drug use: No    Sexual activity: No     Review of Systems   Constitutional: Negative for activity change, chills and fever.   HENT: Positive for voice change. Negative for congestion, ear pain, facial swelling, hearing loss, postnasal drip, tinnitus and trouble swallowing.    Eyes: Negative for photophobia and visual disturbance.   Respiratory: Negative for cough, shortness of breath and stridor.    Gastrointestinal: Negative for nausea and vomiting.   Musculoskeletal: Positive for neck pain. Negative for myalgias and neck stiffness.   Neurological: Negative for dizziness, weakness and light-headedness.     Objective:     Vital Signs (Most Recent):  Temp: 98.6 °F (37 °C) (01/28/18 1421)  Pulse: 65 (01/28/18 1421)  Resp: 18 (01/28/18 1421)  BP: (!) 93/50 (01/28/18 1421)  SpO2: 100 % (01/28/18 1421) Vital Signs (24h Range):  Temp:  [98.6 °F (37 °C)] 98.6 °F (37 °C)  Pulse:  [65] 65  Resp:  [18] 18  SpO2:  [100 %] 100  %  BP: (93)/(50) 93/50     Weight: 104.8 kg (231 lb)  Body mass index is 31.33 kg/m².         Physical Exam  General: NAD; well appearing  Voice:  Quality: rough   Volume: low   Pitch: low pitch   Flexibility: diminished  Head/Face: AT/NC. Normal inspection. HB I bilaterally. Salivary glands WNL.  Eyes: EOMI; PERRL. No scleral injection  Ears: WNL AU  Nose: Normal external appearance and palpation; negative sinus pressure/tenderness. Right septal spur. Normal inferior turbinates, mucosa.   OC: Lips and gingiva normal. MMM. Good dentition. FOM Soft. Anterior tongue normal size with normal mobility. Hard palate normal.  OP: BOT normal. Soft palate normal with midline uvula. No visible or palpable cleft. Tonsils surgically absent. Posterior oropharynx patent.  Neck/Lymphatic: soft with no evidence of LAD, masses, or thyromegaly. Diminished ROM 2/2 pain in all planes. There is point tenderness in level V in the posterior triangle on both sides. SCM is not tender  Chest: normal WOB, no stridor or stertor    Flexible Fiberoptic Laryngoscopy   Nasal cavity/nasopharynx: mucosa WNL w/ no visible lesions, no septal perforation, torus WNL  Oropharynx: pharyngeal wall without edema, lesions, or masses, tonsils WNL, BOT symmetric without masses   Hypopharynx: no lesions of the piriform sinuses or postcricoid area  Supraglottis: no edema or masses noted  Glottis: TVF without lesions and with normal mobility and airway patency  Subglottis: no stenosis or masses            Significant Labs:  CBC:   Recent Labs  Lab 01/28/18  1539   WBC 7.96   RBC 4.03*   HGB 12.7*   HCT 38.1*      MCV 95   MCH 31.5*   MCHC 33.3     CMP:   Recent Labs  Lab 01/27/18  0914      CALCIUM 9.0   ALBUMIN 3.7   PROT 6.6      K 4.6   CO2 26      BUN 42*   CREATININE 2.0*   ALKPHOS 86   ALT 31   AST 20   BILITOT 0.6     Coagulation: No results for input(s): LABPROT, INR, APTT in the last 168 hours.    Significant Diagnostics:  I have  reviewed all pertinent imaging results/findings within the past 24 hours.

## 2018-01-28 NOTE — ASSESSMENT & PLAN NOTE
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 There were no abnormalities on FFL examination.

## 2018-01-28 NOTE — ED TRIAGE NOTES
Returning to ER for continued neck pain.    Pt identifiers checked and correct  LOC: The patient is awake, alert, aware of environment with an appropriate affect. Oriented x3, speaking appropriately  APPEARANCE: Pt resting comfortably, in no acute distress, pt is clean and well groomed, clothing properly fastened  SKIN: Skin warm, dry and intact, normal skin turgor, moist mucus membranes  RESPIRATORY: Airway is open and patent, respirations are spontaneous, even and unlabored, normal effort and rate  MUSCULOSKELETAL: No obvious deformities.

## 2018-01-28 NOTE — ASSESSMENT & PLAN NOTE
68 y/o M w/ h/o multiple vascular pathologies presents to ED w/ posterior neck pain. CTA yesterday showed no abnormalities. FFL today shows no airway anomalies and no sources of pain. His pain is likely musculoskeletal in etiology (myositis?) or may be d/t cervical stenosis or other neurologic causes. The patient currently has no alarming symptoms.    - No ENT intervention indicated. There is no evidence of otolaryngologic causes for his pain at this time.   - Recommend w/u for neurologic and musculoskeletal causes of pain  - Page with questions

## 2018-01-28 NOTE — ED PROVIDER NOTES
Encounter Date: 1/28/2018    SCRIBE #1 NOTE: I, Stone Stewart, am scribing for, and in the presence of,  Dr. Berger. I have scribed the following portions of the note - Other sections scribed: HPI, ROS, and PE.       History     Chief Complaint   Patient presents with    Neck Pain     seen here yest dr sunshine will see pt again     Time patient was seen by the provider: 3:13 PM      The patient is a 67 y.o. male with hx of: chronic combined systolic and diastolic heart failure, HLD, MI, PE, DVT, CAD, and nicotine abuse who presents to the ED with a complaint of persistent right sided neck pain, which began 2 days ago. Pt describes limited range of motion secondary to pain. He notes an associated posterior headache, voice change, and sore throat. States swallowing causes neck pain. No known injury or history of similar pain. Pt denies any trouble swallowing, fever, chills, or shortness of breath. He has taken pain medication without symptom relief.       The history is provided by the patient and medical records.     Review of patient's allergies indicates:   Allergen Reactions    Iodine containing multivitamin     Keflex [cephalexin]     Peaches [peach (prunus persica)]     Shellfish containing products     Tuberculin spenser test ppd     Fig tree Rash     Past Medical History:   Diagnosis Date    Chronic anticoagulation 5/5/2016    Chronic combined systolic and diastolic heart failure 11/26/2012    EF 10-20% on ECHO 2013    Clotting disorder     Coronary artery disease involving native coronary artery of native heart without angina pectoris 11/26/2012    Cath 10- Stents patent non-obstructive disease Cath 11-12015 non-obstructive disease     Diverticulosis of colon     DVT (deep venous thrombosis), unspecified laterality 11/12/2015    Essential hypertension 11/15/2015    Hyperlipidemia     Hypertensive heart disease with heart failure 5/5/2016    MI (myocardial infarction) 2009    Nicotine abuse      Obesity 11/26/2012    Pulmonary embolism 2011    Stented coronary artery 11/26/2012    LAD stent placed 10/17/2007      Past Surgical History:   Procedure Laterality Date    APPENDECTOMY      BACK SURGERY      CARDIAC SURGERY      stent    r knee scope      SPINE SURGERY      TONSILLECTOMY       Family History   Problem Relation Age of Onset    Cancer Mother      colon cancer    Heart disease Mother     Diabetes Mother     Heart disease Father     Stroke Father     Colon polyps Father     Cancer Paternal Grandmother      leukemia    No Known Problems Sister     No Known Problems Daughter     No Known Problems Son     No Known Problems Sister     No Known Problems Son      Social History   Substance Use Topics    Smoking status: Former Smoker     Packs/day: 1.00     Years: 45.00     Types: Cigarettes     Quit date: 12/14/2015    Smokeless tobacco: Never Used      Comment: 1-1.5 ppd every day.    Alcohol use No     Review of Systems   Constitutional: Negative for chills and fever.   HENT: Positive for sore throat and voice change. Negative for trouble swallowing.    Respiratory: Negative for shortness of breath.    Cardiovascular: Negative for chest pain.   Gastrointestinal: Negative for nausea.   Genitourinary: Negative for dysuria.   Musculoskeletal: Positive for neck pain. Negative for back pain.   Skin: Negative for rash.   Neurological: Positive for headaches. Negative for weakness.   Hematological: Does not bruise/bleed easily.       Physical Exam     Initial Vitals [01/28/18 1421]   BP Pulse Resp Temp SpO2   (!) 93/50 65 18 98.6 °F (37 °C) 100 %      MAP       64.33         Physical Exam    Nursing note and vitals reviewed.  Constitutional: He appears well-developed and well-nourished. He is not diaphoretic. No distress.   HENT:   Head: Atraumatic.   Neck:   Limited range of motion due to pain. Pt reports tenderness to palpation along the right anterior lateral neck just above the  medial portion of the right clavicle also tenderness in the posterior auricular region and right occipital region. No palpable induration, fluctuance, or visible lesions are noted. Anteriorly the right side of the neck is a little johnson than the left but no discrete masses felt.    Cardiovascular: Normal rate and regular rhythm.   Pulmonary/Chest: He exhibits no tenderness.   Decreases expiratory time. No acute wheezes or crackles    Neurological: He is alert and oriented to person, place, and time. He has normal strength. No sensory deficit.         ED Course   Procedures  Labs Reviewed   CBC W/ AUTO DIFFERENTIAL   SEDIMENTATION RATE, MANUAL   C-REACTIVE PROTEIN             Medical Decision Making:   ED Management:  Patient discharge instructions:    Your blood tests do not suggest a dangerous infection or active inflammatory problem causing your neck pain.   The ENT doctor examined the back of your throat, nose, and mouth and did not find anything dangerous or that would explain your pain.   It still seems this is primarily muscle spasms---probably caused by pinched nerves in the area of the bones and disks of your neck.  We will try prescribing Diazepam (Valium) which works as a muscle relaxer and is covered by your insurance.      Follow up w/ our spine specialists (see instructions) if you are not responding to this treatment.             Scribe Attestation:   Scribe #1: I performed the above scribed service and the documentation accurately describes the services I performed. I attest to the accuracy of the note.            ED Course      Clinical Impression:   The encounter diagnosis was Neck pain on right side.                           Dez Berger MD  01/28/18 1910

## 2018-01-28 NOTE — CONSULTS
[facility]  Otorhinolaryngology-Head & Neck Surgery  Consult Note    Patient Name: Audrey Oneil Jr.  MRN: [id number]  Code Status: Prior  Admission Date: 1/28/2018  Hospital Length of Stay: 0 days  Attending Physician: Dez Berger MD  Primary Care Provider: January Khan MD    Patient information was obtained from patient and ER records.     Inpatient consult to ENT  Consult performed by: NICOLE HAMILTON  Consult ordered by: DEZ BERGER        Subjective:     Chief Complaint/Reason for Admission: neck pain    History of Present Illness: 68 y/o M w/ h/o CAD, multiple MIs, PVD, HTN presents to ED w/ neck pain for 48 h. The pain is bilateral and generally localizes to the posterior triangles. He states it is worse with head movement and with swallowing but it is always constant. Nothing seems to make it better. He has limited neck ROM 2/2 pain but denies any dysphagia. He also believes that his voice sounds slightly hoarse. He states he was at the ER yesterday for similar pain but was sent home w/ a muscle relaxer that he could not afford and the pain has been persistent. He has never had any pain like this in the past and denies any h/o trauma or head/neck surgery.    Medications:  Continuous Infusions:  Scheduled Meds:  PRN Meds:     No current facility-administered medications on file prior to encounter.      Current Outpatient Prescriptions on File Prior to Encounter   Medication Sig    allopurinol (ZYLOPRIM) 100 MG tablet Take 1 tablet (100 mg total) by mouth once daily.    amiodarone (PACERONE) 200 MG Tab TAKE 1 AND 1/2 TABLETS(300 MG) BY MOUTH EVERY DAY    aspirin (ECOTRIN) 325 MG EC tablet Take 325 mg by mouth once daily.    atorvastatin (LIPITOR) 80 MG tablet TAKE 1 TABLET(80 MG) BY MOUTH EVERY DAY    clopidogrel (PLAVIX) 75 mg tablet Take 75 mg by mouth once daily.    docusate sodium (COLACE) 50 MG capsule Take 1 capsule (50 mg total) by mouth once daily.     furosemide (LASIX) 40 MG tablet TAKE 1 TABLET(40 MG) BY MOUTH EVERY DAY    hydrocodone-acetaminophen 10-325mg (NORCO)  mg Tab TK 1 T PO Q 8-10 H PRN P    lisinopril (PRINIVIL,ZESTRIL) 5 MG tablet Take 1 tablet (5 mg total) by mouth once daily.    metoprolol succinate (TOPROL-XL) 50 MG 24 hr tablet TAKE 1 TABLET BY MOUTH EVERY DAY    spironolactone (ALDACTONE) 25 MG tablet TAKE 1 TABLET(25 MG) BY MOUTH EVERY DAY    methocarbamol (ROBAXIN) 500 MG Tab Take 1 tablet (500 mg total) by mouth 3 (three) times daily.       Review of patient's allergies indicates:   Allergen Reactions    Iodine containing multivitamin     Keflex [cephalexin]     Peaches [peach (prunus persica)]     Shellfish containing products     Tuberculin spenser test ppd     Fig tree Rash       Past Medical History:   Diagnosis Date    Chronic anticoagulation 5/5/2016    Chronic combined systolic and diastolic heart failure 11/26/2012    EF 10-20% on ECHO 2013    Clotting disorder     Coronary artery disease involving native coronary artery of native heart without angina pectoris 11/26/2012    Cath 10- Stents patent non-obstructive disease Cath 11-12015 non-obstructive disease     Diverticulosis of colon     DVT (deep venous thrombosis), unspecified laterality 11/12/2015    Essential hypertension 11/15/2015    Hyperlipidemia     Hypertensive heart disease with heart failure 5/5/2016    MI (myocardial infarction) 2009    Nicotine abuse     Obesity 11/26/2012    Pulmonary embolism 2011    Stented coronary artery 11/26/2012    LAD stent placed 10/17/2007      Past Surgical History:   Procedure Laterality Date    APPENDECTOMY      BACK SURGERY      CARDIAC SURGERY      stent    r knee scope      SPINE SURGERY      TONSILLECTOMY       Family History     Problem Relation (Age of Onset)    Cancer Mother, Paternal Grandmother    Colon polyps Father    Diabetes Mother    Heart disease Mother, Father    No Known Problems  Sister, Daughter, Son, Sister, Son    Stroke Father        Social History Main Topics    Smoking status: Former Smoker     Packs/day: 1.00     Years: 45.00     Types: Cigarettes     Quit date: 12/14/2015    Smokeless tobacco: Never Used      Comment: 1-1.5 ppd every day.    Alcohol use No    Drug use: No    Sexual activity: No     Review of Systems   Constitutional: Negative for activity change, chills and fever.   HENT: Positive for voice change. Negative for congestion, ear pain, facial swelling, hearing loss, postnasal drip, tinnitus and trouble swallowing.    Eyes: Negative for photophobia and visual disturbance.   Respiratory: Negative for cough, shortness of breath and stridor.    Gastrointestinal: Negative for nausea and vomiting.   Musculoskeletal: Positive for neck pain. Negative for myalgias and neck stiffness.   Neurological: Negative for dizziness, weakness and light-headedness.     Objective:     Vital Signs (Most Recent):  Temp: 98.6 °F (37 °C) (01/28/18 1421)  Pulse: 65 (01/28/18 1421)  Resp: 18 (01/28/18 1421)  BP: (!) 93/50 (01/28/18 1421)  SpO2: 100 % (01/28/18 1421) Vital Signs (24h Range):  Temp:  [98.6 °F (37 °C)] 98.6 °F (37 °C)  Pulse:  [65] 65  Resp:  [18] 18  SpO2:  [100 %] 100 %  BP: (93)/(50) 93/50     Weight: 104.8 kg (231 lb)  Body mass index is 31.33 kg/m².         Physical Exam  General: NAD; well appearing  Voice:  Quality: rough   Volume: low   Pitch: low pitch   Flexibility: diminished  Head/Face: AT/NC. Normal inspection. HB I bilaterally. Salivary glands WNL.  Eyes: EOMI; PERRL. No scleral injection  Ears: WNL AU  Nose: Normal external appearance and palpation; negative sinus pressure/tenderness. Right septal spur. Normal inferior turbinates, mucosa.   OC: Lips and gingiva normal. MMM. Good dentition. FOM Soft. Anterior tongue normal size with normal mobility. Hard palate normal.  OP: BOT normal. Soft palate normal with midline uvula. No visible or palpable cleft. Tonsils  surgically absent. Posterior oropharynx patent.  Neck/Lymphatic: soft with no evidence of LAD, masses, or thyromegaly. Diminished ROM 2/2 pain in all planes. There is point tenderness in level V in the posterior triangle on both sides. SCM is not tender  Chest: normal WOB, no stridor or stertor    Flexible Fiberoptic Laryngoscopy   Nasal cavity/nasopharynx: mucosa WNL w/ no visible lesions, no septal perforation, torus WNL  Oropharynx: pharyngeal wall without edema, lesions, or masses, tonsils WNL, BOT symmetric without masses   Hypopharynx: no lesions of the piriform sinuses or postcricoid area  Supraglottis: no edema or masses noted  Glottis: TVF without lesions and with normal mobility and airway patency  Subglottis: no stenosis or masses            Significant Labs:  CBC:   Recent Labs  Lab 01/28/18  1539   WBC 7.96   RBC 4.03*   HGB 12.7*   HCT 38.1*      MCV 95   MCH 31.5*   MCHC 33.3     CMP:   Recent Labs  Lab 01/27/18  0914      CALCIUM 9.0   ALBUMIN 3.7   PROT 6.6      K 4.6   CO2 26      BUN 42*   CREATININE 2.0*   ALKPHOS 86   ALT 31   AST 20   BILITOT 0.6     Coagulation: No results for input(s): LABPROT, INR, APTT in the last 168 hours.    Significant Diagnostics:  I have reviewed all pertinent imaging results/findings within the past 24 hours.    Assessment/Plan:     * Neck pain, bilateral posterior    68 y/o M w/ h/o multiple vascular pathologies presents to ED w/ posterior neck pain. CTA yesterday showed no abnormalities. FFL today shows no airway anomalies and no sources of pain. His pain is likely musculoskeletal in etiology (myositis?) or may be d/t cervical stenosis or other neurologic causes. The patient currently has no alarming symptoms.    - No ENT intervention indicated. There is no evidence of otolaryngologic causes for his pain at this time.   - Recommend w/u for neurologic and musculoskeletal causes of pain  - Page with questions        Dysphonia    This is  likely 2/2 muscle tension dysphonia and could be produced by splinting from neck pain. There were no abnormalities on FFL examination.           VTE Risk Mitigation     None          Thank you for your consult. I will sign off. Please contact us if you have any additional questions.    Ruben Hobbs MD  Otorhinolaryngology-Head & Neck Surgery  [facility]

## 2018-01-28 NOTE — HPI
68 y/o M w/ h/o CAD, multiple MIs, PVD, HTN presents to ED w/ neck pain for 48 h. The pain is bilateral and generally localizes to the posterior triangles. He states it is worse with head movement and with swallowing but it is always constant. Nothing seems to make it better. He has limited neck ROM 2/2 pain but denies any dysphagia. He also believes that his voice sounds slightly hoarse. He states he was at the ER yesterday for similar pain but was sent home w/ a muscle relaxer that he could not afford and the pain has been persistent. He has never had any pain like this in the past and denies any h/o trauma or head/neck surgery.

## 2018-01-29 ENCOUNTER — TELEPHONE (OUTPATIENT)
Dept: ADMINISTRATIVE | Facility: HOSPITAL | Age: 68
End: 2018-01-29

## 2018-01-29 NOTE — DISCHARGE INSTRUCTIONS
Your blood tests do not suggest a dangerous infection or active inflammatory problem causing your neck pain.   The ENT doctor examined the back of your throat, nose, and mouth and did not find anything dangerous or that would explain your pain.   It still seems this is primarily muscle spasms---probably caused by pinched nerves in the area of the bones and disks of your neck.  We will try prescribing Diazepam (Valium) which works as a muscle relaxer and is covered by your insurance.      Follow up w/ our spine specialists (see instructions) if you are not responding to this treatment.

## 2018-01-29 NOTE — TELEPHONE ENCOUNTER
Please see message below from [facility] nurse-     Audrey Oneil [id number] was seen in the ED 1/27/18 and 1/28/18 : dx neck pain. I attempted to contact the patient. Post hospital follow up with Dr. Khan in 3 days per discharge instructions. Please assist and advise patient.       Thanks,      Celina Bailey, RN  RN Navigator  [facility]

## 2018-02-01 DIAGNOSIS — I47.20 VT (VENTRICULAR TACHYCARDIA): ICD-10-CM

## 2018-02-01 RX ORDER — FUROSEMIDE 40 MG/1
TABLET ORAL
Qty: 90 TABLET | Refills: 0 | Status: SHIPPED | OUTPATIENT
Start: 2018-02-01 | End: 2018-05-09 | Stop reason: SDUPTHER

## 2018-02-01 RX ORDER — AMIODARONE HYDROCHLORIDE 200 MG/1
TABLET ORAL
Qty: 135 TABLET | Refills: 0 | Status: ON HOLD | OUTPATIENT
Start: 2018-02-01 | End: 2018-06-16 | Stop reason: SDUPTHER

## 2018-02-20 DIAGNOSIS — I50.42 CHRONIC COMBINED SYSTOLIC AND DIASTOLIC HEART FAILURE: Chronic | ICD-10-CM

## 2018-02-20 RX ORDER — SPIRONOLACTONE 25 MG/1
TABLET ORAL
Qty: 90 TABLET | Refills: 0 | Status: ON HOLD | OUTPATIENT
Start: 2018-02-20 | End: 2018-06-16 | Stop reason: SDUPTHER

## 2018-02-22 ENCOUNTER — OFFICE VISIT (OUTPATIENT)
Dept: ORTHOPEDICS | Facility: CLINIC | Age: 68
End: 2018-02-22
Payer: MEDICARE

## 2018-02-22 VITALS — RESPIRATION RATE: 18 BRPM

## 2018-02-22 DIAGNOSIS — G56.01 RIGHT CARPAL TUNNEL SYNDROME: Primary | ICD-10-CM

## 2018-02-22 PROCEDURE — 3008F BODY MASS INDEX DOCD: CPT | Mod: S$GLB,TXP,, | Performed by: ORTHOPAEDIC SURGERY

## 2018-02-22 PROCEDURE — 99999 PR PBB SHADOW E&M-EST. PATIENT-LVL II: CPT | Mod: PBBFAC,TXP,, | Performed by: ORTHOPAEDIC SURGERY

## 2018-02-22 PROCEDURE — 99204 OFFICE O/P NEW MOD 45 MIN: CPT | Mod: S$GLB,TXP,, | Performed by: ORTHOPAEDIC SURGERY

## 2018-02-22 NOTE — PROGRESS NOTES
I have personally taken the history and examined this patient. I agree with the resident's note as stated above. Pt was being seen by Dr. Mayberry. Diagnosed with with CTS- Offered surgery for CTS and TFCC tear but the insurance was not taken by the group. Today pt c/o of numbness in thumb, index, long. Pt also c/o righ thumb trigger finger. + TTP over A1 pulley.   I have explained the risks, benefits, and alternatives of the procedure to the patient in great detail. The patient voices understanding and all questions have been answered. The patient agrees with to proceed as planned. Consents were performed in clinic.

## 2018-02-22 NOTE — PROGRESS NOTES
Subjective:      Patient ID: Audrey Oneil Jr. is a 67 y.o. male.    Chief Complaint: Pain of the Right Wrist      HPI  Audrey Oneil Jr. is a right hand dominant 67 y.o. male presenting today for right hand numbness. He has constant numbness in the first three digits of the right hand. Symptoms awaken him at night. He has been wearing a brace at night which seems to help. Denies symptoms of the left hand. He has previously been seen by Dr. Mayberry who ordered EMG which showed moderate right and mild left carpal tunnel syndrome.         Review of patient's allergies indicates:   Allergen Reactions    Iodine containing multivitamin     Keflex [cephalexin]     Peaches [peach (prunus persica)]     Shellfish containing products     Tuberculin spenser test ppd     Fig tree Rash         Current Outpatient Prescriptions   Medication Sig Dispense Refill    allopurinol (ZYLOPRIM) 100 MG tablet Take 1 tablet (100 mg total) by mouth once daily. 90 tablet 1    amiodarone (PACERONE) 200 MG Tab TAKE 1 AND 1/2 TABLETS(300 MG) BY MOUTH EVERY  tablet 0    aspirin (ECOTRIN) 325 MG EC tablet Take 325 mg by mouth once daily.      atorvastatin (LIPITOR) 80 MG tablet TAKE 1 TABLET(80 MG) BY MOUTH EVERY DAY 90 tablet 0    clopidogrel (PLAVIX) 75 mg tablet Take 75 mg by mouth once daily.  10    docusate sodium (COLACE) 50 MG capsule Take 1 capsule (50 mg total) by mouth once daily.  0    furosemide (LASIX) 40 MG tablet TAKE 1 TABLET(40 MG) BY MOUTH EVERY DAY 90 tablet 0    hydrocodone-acetaminophen 10-325mg (NORCO)  mg Tab TK 1 T PO Q 8-10 H PRN P  0    lisinopril (PRINIVIL,ZESTRIL) 5 MG tablet Take 1 tablet (5 mg total) by mouth once daily. 90 tablet 3    metoprolol succinate (TOPROL-XL) 50 MG 24 hr tablet TAKE 1 TABLET BY MOUTH EVERY DAY 90 tablet 3    spironolactone (ALDACTONE) 25 MG tablet TAKE 1 TABLET(25 MG) BY MOUTH EVERY DAY 90 tablet 0    diazePAM (VALIUM) 5 MG tablet Take 1-2 tablets (5-10 mg total)  by mouth every 8 (eight) hours as needed (muscle spasms). 24 tablet 0     No current facility-administered medications for this visit.        Past Medical History:   Diagnosis Date    Chronic anticoagulation 5/5/2016    Chronic combined systolic and diastolic heart failure 11/26/2012    EF 10-20% on ECHO 2013    Clotting disorder     Coronary artery disease involving native coronary artery of native heart without angina pectoris 11/26/2012    Cath 10- Stents patent non-obstructive disease Cath 11-12015 non-obstructive disease     Diverticulosis of colon     DVT (deep venous thrombosis), unspecified laterality 11/12/2015    Essential hypertension 11/15/2015    Hyperlipidemia     Hypertensive heart disease with heart failure 5/5/2016    MI (myocardial infarction) 2009    Nicotine abuse     Obesity 11/26/2012    Pulmonary embolism 2011    Stented coronary artery 11/26/2012    LAD stent placed 10/17/2007        Past Surgical History:   Procedure Laterality Date    APPENDECTOMY      BACK SURGERY      CARDIAC SURGERY      stent    r knee scope      SPINE SURGERY      TONSILLECTOMY         Review of Systems:  Constitutional: Negative for chills and fever.   Respiratory: Negative for cough and shortness of breath.    Gastrointestinal: Negative for nausea and vomiting.   Skin: Negative for rash.   Neurological: Negative for dizziness and headaches.   Psychiatric/Behavioral: Negative for depression.   MSK as in HPI       OBJECTIVE:     PHYSICAL EXAM:  Resp 18     GEN:  NAD, well-developed, well-groomed.  NEURO: Awake, alert, and oriented. Normal attention and concentration.    PSYCH: Normal mood and affect. Behavior is normal.  HEENT: No cervical lymphadenopathy noted.  CARDIOVASCULAR: Radial pulses 2+ bilaterally. No LE edema noted.  PULMONARY: Breath sounds normal. No respiratory distress.  SKIN: Intact, no rashes.      MSK:   RUE:  Good active ROM of the wrist and fingers.  AIN/PIN/Radial/Median/Ulnar Nerves assessed in isolation without deficit. Radial & Ulnar arteries palpated 2+. Capillary Refill <3s. Positive tinels, positive durkans.       RADIOGRAPHS:    Comments: I have personally reviewed the imaging and I agree with the above radiologist's report.    ASSESSMENT/PLAN:       ICD-10-CM ICD-9-CM   1. Right carpal tunnel syndrome G56.01 354.0          No orders of the defined types were placed in this encounter.       Plan:           The patient indicates understanding of these issues and agrees to the plan.    Jessica Godoy PA-C  [facility]   [facility]  [place]

## 2018-02-22 NOTE — LETTER
February 26, 2018      January Khan MD  [address]  [facility] [address]           [facility]  [address], [address]  [facility] [address]  Phone: [phone]          Patient: Audrey Oneil Jr.   MR Number: [id number]   YOB: 1950   Date of Visit: 2/22/2018       Dear Dr. Nascimento:    Thank you for referring Audrey Oneil to me for evaluation. Attached you will find relevant portions of my assessment and plan of care.    If you have questions, please do not hesitate to call me. I look forward to following Audrey Oneil along with you.    Sincerely,    Barbara Zapata MD    Enclosure  CC:  No Recipients    If you would like to receive this communication electronically, please contact [email or web address] or [phone] to request more information on [employer] Link access.    For providers and/or their staff who would like to refer a patient to [facility], please contact us through our one-stop-shop provider referral line, [facility], at [phone].    If you feel you have received this communication in error or would no longer like to receive these types of communications, please e-mail [email or web address]

## 2018-02-23 DIAGNOSIS — M65.311 TRIGGER FINGER OF RIGHT THUMB: ICD-10-CM

## 2018-02-23 DIAGNOSIS — G56.01 RIGHT CARPAL TUNNEL SYNDROME: Primary | ICD-10-CM

## 2018-03-05 ENCOUNTER — TELEPHONE (OUTPATIENT)
Dept: TRANSPLANT | Facility: CLINIC | Age: 68
End: 2018-03-05

## 2018-03-05 NOTE — TELEPHONE ENCOUNTER
----- Message from Vandana Vidal sent at 3/5/2018  9:50 AM CST -----  Contact: Patient  The Pt is calling to see if he needs to stay off any of his medication for his surgery on 3/9/2018. Please call him back @ [phone] or [phone]. Thanks, Vandana

## 2018-03-05 NOTE — TELEPHONE ENCOUNTER
"Patient arrived to clinic without appointment asking about holding Plavix for upcoming carpal tunnel release on 3/9/18.  Pt cannot verbalize when PCI performed; states "I don't know anything about myself, there's so much."  Asked if he can remember having any cardiac procedures in the last one year; pt could not.  Chart reviewed.  There is notation of PCI to LAD in 2007 with repeat angiography in 2013 and 2015 with patent stents.  Dr. Marshall advised to have patient hold Plavix starting today with instruction to resume following surgery.  "

## 2018-03-07 ENCOUNTER — TELEPHONE (OUTPATIENT)
Dept: ORTHOPEDICS | Facility: CLINIC | Age: 68
End: 2018-03-07

## 2018-03-07 ENCOUNTER — HOSPITAL ENCOUNTER (EMERGENCY)
Facility: HOSPITAL | Age: 68
Discharge: HOME | End: 2018-03-07
Attending: EMERGENCY MEDICINE
Payer: MEDICARE

## 2018-03-07 VITALS
RESPIRATION RATE: 16 BRPM | SYSTOLIC BLOOD PRESSURE: 96 MMHG | OXYGEN SATURATION: 99 % | DIASTOLIC BLOOD PRESSURE: 53 MMHG | BODY MASS INDEX: 36.1 KG/M2 | WEIGHT: 230 LBS | TEMPERATURE: 98 F | HEART RATE: 75 BPM | HEIGHT: 67 IN

## 2018-03-07 DIAGNOSIS — M79.675 TOE PAIN, LEFT: Primary | ICD-10-CM

## 2018-03-07 PROCEDURE — 99283 EMERGENCY DEPT VISIT LOW MDM: CPT | Mod: NTX

## 2018-03-07 PROCEDURE — 99283 EMERGENCY DEPT VISIT LOW MDM: CPT | Mod: NTX,,, | Performed by: EMERGENCY MEDICINE

## 2018-03-07 PROCEDURE — 25000003 PHARM REV CODE 250: Mod: NTX | Performed by: PHYSICIAN ASSISTANT

## 2018-03-07 RX ORDER — ACETAMINOPHEN 500 MG
1000 TABLET ORAL
Status: COMPLETED | OUTPATIENT
Start: 2018-03-07 | End: 2018-03-07

## 2018-03-07 RX ADMIN — ACETAMINOPHEN 1000 MG: 500 TABLET ORAL at 01:03

## 2018-03-07 NOTE — TELEPHONE ENCOUNTER
Spoke w/pt, notified will have to check with Dr Forbes about his surgery.     He states he is going to probably go to the ER today for his foot infection.

## 2018-03-07 NOTE — TELEPHONE ENCOUNTER
Spoke w/pt sister at this number. She states patient is still at [facility] for his toe. She stated she will pass along a message to him. Notified will check with Dr Zapata on tomorrow about his surgery.

## 2018-03-07 NOTE — TELEPHONE ENCOUNTER
----- Message from Vick Fajardo sent at 3/7/2018  9:52 AM CST -----  Contact: patient  x_ 1st Request   _ 2nd Request   _ 3rd Request     Who: PEPE LICONA JR. [[id number]]    Why: patient is requesting a call back in reference to he is scheduled for procedure on right wrist on 03/09 but patient has infection on his left foot.  Patient is requesting a call back in reference to if it will keep him from having his procedure.  Please call the patient and advise.    What Number to Call Back: [phone]    When to Expect a call back: (Before the end of the day)   -- if call after 3:00 call back will be tomorrow.

## 2018-03-07 NOTE — ED PROVIDER NOTES
Encounter Date: 3/7/2018    SCRIBE #1 NOTE: I, Joseluis Saucedo, am scribing for, and in the presence of,  Dr. Fuentes . I have scribed the following portions of the note - the APC attestation.       History     Chief Complaint   Patient presents with    Toe Pain     67 year old male with PMH of MI, CHF, DVT + PE and gout presents with left fifth toe pain X 1 week.  Denies trauma to the toe, he reports pain began suddenly, he thought he might have an ingrown toenail.  Has been performing warm soaks of the foot with some improvement.  Reports associated pain with ambulation.  Denies fevers, chills, cold in the extremity, weakness or changes in sensation. States that this pain does not feel like previous gout attacks.  He is scheduled for carpal tunnel surgery on 3/9/18, he is concerned about needing to re schedule the surgery.          Review of patient's allergies indicates:   Allergen Reactions    Iodine containing multivitamin     Keflex [cephalexin]     Peaches [peach (prunus persica)]     Shellfish containing products     Tuberculin spenser test ppd     Fig tree Rash     Past Medical History:   Diagnosis Date    Chronic anticoagulation 5/5/2016    Chronic combined systolic and diastolic heart failure 11/26/2012    EF 10-20% on ECHO 2013    Clotting disorder     Coronary artery disease involving native coronary artery of native heart without angina pectoris 11/26/2012    Cath 10- Stents patent non-obstructive disease Cath 11-12015 non-obstructive disease     Diverticulosis of colon     DVT (deep venous thrombosis), unspecified laterality 11/12/2015    Essential hypertension 11/15/2015    Hyperlipidemia     Hypertensive heart disease with heart failure 5/5/2016    MI (myocardial infarction) 2009    Nicotine abuse     Obesity 11/26/2012    Pulmonary embolism 2011    Stented coronary artery 11/26/2012    LAD stent placed 10/17/2007      Past Surgical History:   Procedure Laterality Date     APPENDECTOMY      BACK SURGERY      CARDIAC SURGERY      stent    r knee scope      SPINE SURGERY      TONSILLECTOMY       Family History   Problem Relation Age of Onset    Cancer Mother      colon cancer    Heart disease Mother     Diabetes Mother     Heart disease Father     Stroke Father     Colon polyps Father     Cancer Paternal Grandmother      leukemia    No Known Problems Sister     No Known Problems Daughter     No Known Problems Son     No Known Problems Sister     No Known Problems Son      Social History   Substance Use Topics    Smoking status: Former Smoker     Packs/day: 1.00     Years: 45.00     Types: Cigarettes     Quit date: 12/14/2015    Smokeless tobacco: Never Used      Comment: 1-1.5 ppd every day.    Alcohol use No     Review of Systems   Constitutional: Negative for chills, fatigue and fever.   HENT: Negative for sore throat.    Respiratory: Negative for cough and shortness of breath.    Cardiovascular: Negative for chest pain, palpitations and leg swelling.   Gastrointestinal: Negative for abdominal pain, constipation and diarrhea.   Musculoskeletal: Positive for arthralgias. Negative for back pain and joint swelling.   Skin: Positive for color change.   Allergic/Immunologic: Negative for immunocompromised state.   Neurological: Negative for light-headedness and numbness.   Hematological: Does not bruise/bleed easily.       Physical Exam     Initial Vitals [03/07/18 1204]   BP Pulse Resp Temp SpO2   (!) 96/53 75 16 98.1 °F (36.7 °C) 99 %      MAP       67.33         Physical Exam    Nursing note and vitals reviewed.  Constitutional: He is not diaphoretic. No distress.   HENT:   Head: Normocephalic and atraumatic.   Eyes: EOM are normal. Pupils are equal, round, and reactive to light.   Neck: Normal range of motion. Neck supple.   Cardiovascular: Normal rate, regular rhythm and normal heart sounds. Exam reveals no gallop and no friction rub.    No murmur  heard.  Pulmonary/Chest: Breath sounds normal. No respiratory distress. He has no wheezes. He has no rales.   Abdominal: Soft. Bowel sounds are normal. He exhibits no distension. There is no tenderness.   Musculoskeletal: Normal range of motion. He exhibits edema and tenderness.   Left fifth toe erythematous with mild swelling.  Tender to palpation along lateral side of foot to head of fifth metatarsal.  No wound, abscess, bleeding or discharge noted   Neurological: He is alert and oriented to person, place, and time.   Skin: Skin is warm and dry. Capillary refill takes less than 2 seconds. There is erythema.   Psychiatric: He has a normal mood and affect.         ED Course   Procedures  Labs Reviewed - No data to display     Imaging Results          X-Ray Toe 2 or More Views Left (Final result)  Result time 03/07/18 14:20:56    Final result by Chiqui Newman Jr., MD (03/07/18 14:20:56)                 Impression:     See above      Electronically signed by: CHIQUI NEWMAN MD  Date:     03/07/18  Time:    14:20              Narrative:    Left Little toe.  Bones appear intact.  No acute fracture seen.  Degenerative change particularly at the IP joint of the great toe.  Little toe not well seen on the lateral projection.  No convincing bony destruction.                                 Medical Decision Making:   Clinical Tests:   Radiological Study: Ordered and Reviewed       APC / Resident Notes:   67 year old male presenting with painful swollen left small toe.  Toe is somewhat erythematous but does not look outright cellulitic.  Mildly tender without any lymphatic streaming or purulence.  Ddx includes trauma, fracture, cellulitis, osteomyelitis.  Will obtain x-ray, give tylenol for pain and reassess.    X-ray without abnormality.  I suspect the pain is due to minor trauma and not infection. His pain and swelling have improved over the past week with hot water soaks. Instructed patient to perform RICE treatment of the  toe. I do not believe he requires further ED treatment at this time and is stable for discharge.  Discussed the importance of PCP follow up and ED return precautions, patient voiced understanding, all questions answered.  I discussed this patient with my supervising physician who also examined the patient.    YAHAIRA Vuong Attestation:   Scribe #1: I performed the above scribed service and the documentation accurately describes the services I performed. I attest to the accuracy of the note.    Attending Attestation:     Physician Attestation Statement for NP/PA:   I have conducted a face to face encounter with this patient in addition to the NP/PA, due to Medical Complexity    Other NP/PA Attestation Additions:    History of Present Illness: I am in agreement with my physician assistant's assessment, treatment, and plan of care. I see no evidence of bacterial cellulitis. Pt is very well appearing. Advised to ice and elevate for non specific toe swelling.            Physician Attestation for Scribe:      Comments: I, Dr. Tony Fuentes, personally performed the services described in this documentation. All medical record entries made by the scribe were at my direction and in my presence.  I have reviewed the chart and agree that the record reflects my personal performance and is accurate and complete. Tony Fuentes MD.  4:10 PM 03/07/2018                 Clinical Impression:   The encounter diagnosis was Toe pain, left.                           Laureen Gonzalez PA-C  03/07/18 1553       Tony Fuentes MD  03/07/18 8461

## 2018-03-07 NOTE — ED TRIAGE NOTES
Pt presents to the ED c/o left foot pain x 1 week. Pt states his fifth toe is swollen. Denies trauma. Redness and swelling noted. Pt ambulatory.

## 2018-03-08 ENCOUNTER — TELEPHONE (OUTPATIENT)
Dept: ORTHOPEDICS | Facility: CLINIC | Age: 68
End: 2018-03-08

## 2018-03-08 NOTE — TELEPHONE ENCOUNTER
Spoke w/pt family on this line. She states she will have pt call office. Notified per LS will need to r/s surgery from 3/9/18 d/t foot infection.

## 2018-03-09 ENCOUNTER — TELEPHONE (OUTPATIENT)
Dept: ORTHOPEDICS | Facility: CLINIC | Age: 68
End: 2018-03-09

## 2018-03-09 NOTE — TELEPHONE ENCOUNTER
Spoke w/pt, surgery r/s to 4/6/18. Notified pt sx will be at [facility]. Notified pt he will need to call to schedule a pre-admit appt. He verbalized understanding. Map with phone number for pre-admit in mail to patient.

## 2018-03-09 NOTE — TELEPHONE ENCOUNTER
----- Message from Kehinde Andrade sent at 3/9/2018  7:40 AM CST -----  Contact: Audrey Oneil  X_  1st Request  _  2nd Request  _  3rd Request        Who: Audrey Oneil    Why: Patient called in regards to information on surgery      Please return the call at earliest convenience. Thanks!    What Number to Call Back: [phone] or [phone]      When to Expect a call back: (Within 24 hours)

## 2018-03-14 ENCOUNTER — TELEPHONE (OUTPATIENT)
Dept: ORTHOPEDICS | Facility: CLINIC | Age: 68
End: 2018-03-14

## 2018-03-14 NOTE — TELEPHONE ENCOUNTER
----- Message from Zarina Badillo sent at 3/14/2018  4:18 PM CDT -----  Contact: self  _x  1st Request  _  2nd Request  _  3rd Request    Who: pt    Why: pt calling to confirm surgery date.. Please advise    What Number to Call Back: [phone]    When to Expect a call back: (Before the end of the day)   -- if call after 3:00 call back will be tomorrow.

## 2018-03-19 RX ORDER — ATORVASTATIN CALCIUM 80 MG/1
TABLET, FILM COATED ORAL
Qty: 90 TABLET | Refills: 0 | Status: SHIPPED | OUTPATIENT
Start: 2018-03-19 | End: 2018-06-25 | Stop reason: SDUPTHER

## 2018-04-05 ENCOUNTER — HOSPITAL ENCOUNTER (OUTPATIENT)
Dept: PREADMISSION TESTING | Facility: OTHER | Age: 68
Discharge: HOME | End: 2018-04-05
Attending: ORTHOPAEDIC SURGERY
Payer: MEDICARE

## 2018-04-05 ENCOUNTER — TELEPHONE (OUTPATIENT)
Dept: ORTHOPEDICS | Facility: CLINIC | Age: 68
End: 2018-04-05

## 2018-04-05 ENCOUNTER — ANESTHESIA EVENT (OUTPATIENT)
Dept: SURGERY | Facility: OTHER | Age: 68
End: 2018-04-05
Payer: MEDICARE

## 2018-04-05 VITALS
OXYGEN SATURATION: 96 % | WEIGHT: 230 LBS | BODY MASS INDEX: 36.1 KG/M2 | TEMPERATURE: 98 F | HEIGHT: 67 IN | DIASTOLIC BLOOD PRESSURE: 50 MMHG | SYSTOLIC BLOOD PRESSURE: 101 MMHG | HEART RATE: 73 BPM

## 2018-04-05 RX ORDER — FENTANYL CITRATE 50 UG/ML
25 INJECTION, SOLUTION INTRAMUSCULAR; INTRAVENOUS EVERY 5 MIN PRN
Status: CANCELLED | OUTPATIENT
Start: 2018-04-05

## 2018-04-05 RX ORDER — SODIUM CHLORIDE 0.9 % (FLUSH) 0.9 %
3 SYRINGE (ML) INJECTION
Status: DISCONTINUED | OUTPATIENT
Start: 2018-04-05 | End: 2018-04-06 | Stop reason: HOSPADM

## 2018-04-05 RX ORDER — OXYCODONE HYDROCHLORIDE 5 MG/1
5 TABLET ORAL
Status: CANCELLED | OUTPATIENT
Start: 2018-04-05

## 2018-04-05 RX ORDER — MEPERIDINE HYDROCHLORIDE 50 MG/ML
12.5 INJECTION INTRAMUSCULAR; INTRAVENOUS; SUBCUTANEOUS ONCE AS NEEDED
Status: CANCELLED | OUTPATIENT
Start: 2018-04-05 | End: 2018-04-05

## 2018-04-05 RX ORDER — HYDROMORPHONE HYDROCHLORIDE 2 MG/ML
0.4 INJECTION, SOLUTION INTRAMUSCULAR; INTRAVENOUS; SUBCUTANEOUS EVERY 5 MIN PRN
Status: CANCELLED | OUTPATIENT
Start: 2018-04-05

## 2018-04-05 RX ORDER — MIDAZOLAM HYDROCHLORIDE 1 MG/ML
5 INJECTION INTRAMUSCULAR; INTRAVENOUS
Status: DISCONTINUED | OUTPATIENT
Start: 2018-04-06 | End: 2018-04-06 | Stop reason: HOSPADM

## 2018-04-05 RX ORDER — ONDANSETRON 2 MG/ML
4 INJECTION INTRAMUSCULAR; INTRAVENOUS DAILY PRN
Status: CANCELLED | OUTPATIENT
Start: 2018-04-05

## 2018-04-05 NOTE — TELEPHONE ENCOUNTER
Spoke with pt , 6AM arrival time for surgery tomorrow with  at [facility].

## 2018-04-05 NOTE — ANESTHESIA PREPROCEDURE EVALUATION
04/05/2018  Audrey Oneil Jr. is a 67 y.o., male with PMHx of CAD s/p MI and stenting, combined systolic and diastolic heart failure with EF 10%, hx of PE and DVT on plavix, HTN, AICD in place, HLD, CKD3,       Anesthesia Evaluation    I have reviewed the Patient Summary Reports.    I have reviewed the Nursing Notes.   I have reviewed the Medications.     Review of Systems  Anesthesia Hx:  No problems with previous Anesthesia Denies Hx of Anesthetic complications  History of prior surgery of interest to airway management or planning:  Denies Personal Hx of Anesthesia complications.   Social:  Former Smoker, Alcohol Use    Hematology/Oncology:         -- Anemia: Hematology Comments: Hx of DVT and PE on plavix    EENT/Dental:EENT/Dental Normal   Cardiovascular:   Pacemaker (AICD) Hypertension Past MI (X 5) CAD  CABG/stent  CHF hyperlipidemia ECG has been reviewed. Echo 6/17:  CONCLUSIONS     1 - Severely depressed left ventricular systolic function (EF 10-15%).     2 - Eccentric hypertrophy.     3 - Impaired LV relaxation, normal LAP (grade 1 diastolic dysfunction).     4 - The estimated PA systolic pressure is greater than 21 mmHg.    Pulmonary:  Pulmonary Normal    Renal/:   Chronic Renal Disease, CRI    Hepatic/GI:  Hepatic/GI Normal    Musculoskeletal:  Musculoskeletal Normal    Neurological:  Neurology Normal    Endocrine:  Endocrine Normal    Psych:  Psychiatric Normal           Physical Exam  General:  Well nourished    Airway/Jaw/Neck:  Airway Findings: Mouth Opening: Normal Tongue: Normal  General Airway Assessment: Average  Mallampati: III  Improves to II with phonation.  TM Distance: Normal, at least 6 cm  Jaw/Neck Findings:  Neck ROM: Normal ROM      Dental:  Dental Findings: Periodontal disease, Severe    Chest/Lungs:  Chest/Lungs Findings: Normal Respiratory Rate     Heart/Vascular:  Heart  Findings: Rate: Normal        Mental Status:  Mental Status Findings:  Alert and Oriented         Anesthesia Plan  Type of Anesthesia, risks & benefits discussed:  Anesthesia Type:  MAC  Patient's Preference:   Intra-op Monitoring Plan: standard ASA monitors  Intra-op Monitoring Plan Comments:   Post Op Pain Control Plan: per primary service following discharge from PACU  Post Op Pain Control Plan Comments:   Induction:   IV  Beta Blocker:         Informed Consent: Patient understands risks and agrees with Anesthesia plan.  Questions answered. Anesthesia consent signed with patient.  ASA Score: 4     Day of Surgery Review of History & Physical:    H&P update referred to the surgeon.         Ready For Surgery From Anesthesia Perspective.

## 2018-04-05 NOTE — H&P
Patient ID: Audrey Oneil Jr. is a 67 y.o. male.     Chief Complaint: Pain of the Right Wrist        HPI  Audrey Oneil Jr. is a right hand dominant 67 y.o. male presenting today for right hand numbness. He has constant numbness in the first three digits of the right hand. Symptoms awaken him at night. He has been wearing a brace at night which seems to help. Denies symptoms of the left hand. He has previously been seen by Dr. Mayberry who ordered EMG which showed moderate right and mild left carpal tunnel syndrome.                 Review of patient's allergies indicates:   Allergen Reactions    Iodine containing multivitamin      Keflex [cephalexin]      Peaches [peach (prunus persica)]      Shellfish containing products      Tuberculin spenser test ppd      Fig tree Rash          Current Medications   Current Outpatient Prescriptions   Medication Sig Dispense Refill    allopurinol (ZYLOPRIM) 100 MG tablet Take 1 tablet (100 mg total) by mouth once daily. 90 tablet 1    amiodarone (PACERONE) 200 MG Tab TAKE 1 AND 1/2 TABLETS(300 MG) BY MOUTH EVERY  tablet 0    aspirin (ECOTRIN) 325 MG EC tablet Take 325 mg by mouth once daily.        atorvastatin (LIPITOR) 80 MG tablet TAKE 1 TABLET(80 MG) BY MOUTH EVERY DAY 90 tablet 0    clopidogrel (PLAVIX) 75 mg tablet Take 75 mg by mouth once daily.   10    docusate sodium (COLACE) 50 MG capsule Take 1 capsule (50 mg total) by mouth once daily.   0    furosemide (LASIX) 40 MG tablet TAKE 1 TABLET(40 MG) BY MOUTH EVERY DAY 90 tablet 0    hydrocodone-acetaminophen 10-325mg (NORCO)  mg Tab TK 1 T PO Q 8-10 H PRN P   0    lisinopril (PRINIVIL,ZESTRIL) 5 MG tablet Take 1 tablet (5 mg total) by mouth once daily. 90 tablet 3    metoprolol succinate (TOPROL-XL) 50 MG 24 hr tablet TAKE 1 TABLET BY MOUTH EVERY DAY 90 tablet 3    spironolactone (ALDACTONE) 25 MG tablet TAKE 1 TABLET(25 MG) BY MOUTH EVERY DAY 90 tablet 0    diazePAM (VALIUM) 5 MG tablet Take  1-2 tablets (5-10 mg total) by mouth every 8 (eight) hours as needed (muscle spasms). 24 tablet 0      No current facility-administered medications for this visit.                  Past Medical History:   Diagnosis Date    Chronic anticoagulation 5/5/2016    Chronic combined systolic and diastolic heart failure 11/26/2012     EF 10-20% on ECHO 2013    Clotting disorder      Coronary artery disease involving native coronary artery of native heart without angina pectoris 11/26/2012     Cath 10- Stents patent non-obstructive disease Cath 11-12015 non-obstructive disease     Diverticulosis of colon      DVT (deep venous thrombosis), unspecified laterality 11/12/2015    Essential hypertension 11/15/2015    Hyperlipidemia      Hypertensive heart disease with heart failure 5/5/2016    MI (myocardial infarction) 2009    Nicotine abuse      Obesity 11/26/2012    Pulmonary embolism 2011    Stented coronary artery 11/26/2012     LAD stent placed 10/17/2007                Past Surgical History:   Procedure Laterality Date    APPENDECTOMY        BACK SURGERY        CARDIAC SURGERY         stent    r knee scope        SPINE SURGERY        TONSILLECTOMY             Review of Systems:  Constitutional: Negative for chills and fever.   Respiratory: Negative for cough and shortness of breath.    Gastrointestinal: Negative for nausea and vomiting.   Skin: Negative for rash.   Neurological: Negative for dizziness and headaches.   Psychiatric/Behavioral: Negative for depression.   MSK as in HPI         OBJECTIVE:      PHYSICAL EXAM:  Resp 18      GEN:  NAD, well-developed, well-groomed.  NEURO: Awake, alert, and oriented. Normal attention and concentration.    PSYCH: Normal mood and affect. Behavior is normal.  HEENT: No cervical lymphadenopathy noted.  CARDIOVASCULAR: Radial pulses 2+ bilaterally. No LE edema noted.  PULMONARY: Breath sounds normal. No respiratory distress.  SKIN: Intact, no rashes.       MSK:    RUE:  Good active ROM of the wrist and fingers. AIN/PIN/Radial/Median/Ulnar Nerves assessed in isolation without deficit. Radial & Ulnar arteries palpated 2+. Capillary Refill <3s. Positive tinels, positive durkans.         RADIOGRAPHS:     Comments: I have personally reviewed the imaging and I agree with the above radiologist's report.     ASSESSMENT/PLAN:          ICD-10-CM ICD-9-CM   1. Right carpal tunnel syndrome G56.01 354.0         No orders of the defined types were placed in this encounter.         Plan:       Patient's prior surgery delayed due to toe infection    Plan for RIGHT trigger thumb release an right carpal tunnel release

## 2018-04-05 NOTE — DISCHARGE INSTRUCTIONS
PRE-ADMIT TESTING -  [phone]    [address]  [place] [facility]          Your surgery has been scheduled at [facility]. We are pleased to have the opportunity to serve you. For Further Information please call [phone].    On the day of surgery please report to the Information Desk on the 1st floor.    · CONTACT YOUR PHYSICIAN'S OFFICE THE DAY PRIOR TO YOUR SURGERY TO OBTAIN YOUR ARRIVAL TIME.     · The evening before surgery do not eat anything after 9 p.m. ( this includes hard candy, chewing gum and mints).  You may only have GATORADE, POWERADE AND WATER  from 9 p.m. until you leave your home.   DO NOT DRINK ANY LIQUIDS ON THE WAY TO THE HOSPITAL.      SPECIAL MEDICATION INSTRUCTIONS: TAKE medications checked off by the Anesthesiologist on your Medication List.    Angiogram Patients: Take medications as instructed by your physician, including aspirin.     Surgery Patients:    If you take ASPIRIN - Your PHYSICIAN/SURGEON will need to inform you IF/OR when you need to stop taking aspirin prior to your surgery.     Do Not take any medications containing IBUPROFEN.  Do Not Wear any make-up or dark nail polish   (especially eye make-up) to surgery. If you come to surgery with makeup on you will be required to remove the makeup or nail polish.    Do not shave your surgical area at least 5 days prior to your surgery. The surgical prep will be performed at the hospital according to Infection Control regulations.    Leave all valuables at home.   Do Not wear any jewelry or watches, including any metal in body piercings.  Contact Lens must be removed before surgery. Either do not wear the contact lens or bring a case and solution for storage.  Please bring a container for eyeglasses or dentures as required.  Bring any paperwork your physician has provided, such as consent forms,  history and physicals, doctor's orders, etc.   Bring comfortable clothes that are loose fitting to wear upon  discharge. Take into consideration the type of surgery being performed.  Maintain your diet as advised per your physician the day prior to surgery.      Adequate rest the night before surgery is advised.   Park in the Parking lot behind the hospital or in the [facility] across the street from the parking lot. Parking is complimentary.  If you will be discharged the same day as your procedure, please arrange for a responsible adult to drive you home or to accompany you if traveling by taxi.   YOU WILL NOT BE PERMITTED TO DRIVE OR TO LEAVE THE HOSPITAL ALONE AFTER SURGERY.   It is strongly recommended that you arrange for someone to remain with you for the first 24 hrs following your surgery.       Thank you for your cooperation.  The Staff of [facility].        Bathing Instructions                                                                 Please shower the evening before and morning of your procedure with    ANTIBACTERIAL SOAP. ( DIAL, etc )  Concentrate on the surgical area   for at least 3 minutes and rinse completely. Dry off as usual.   Do not use any deodorant, powder, body lotions, perfume, after shave or    cologne.

## 2018-04-06 ENCOUNTER — HOSPITAL ENCOUNTER (OUTPATIENT)
Facility: OTHER | Age: 68
Discharge: HOME | End: 2018-04-06
Attending: ORTHOPAEDIC SURGERY | Admitting: ORTHOPAEDIC SURGERY
Payer: MEDICARE

## 2018-04-06 ENCOUNTER — SURGERY (OUTPATIENT)
Age: 68
End: 2018-04-06

## 2018-04-06 ENCOUNTER — ANESTHESIA (OUTPATIENT)
Dept: SURGERY | Facility: OTHER | Age: 68
End: 2018-04-06
Payer: MEDICARE

## 2018-04-06 ENCOUNTER — TELEPHONE (OUTPATIENT)
Dept: ORTHOPEDICS | Facility: CLINIC | Age: 68
End: 2018-04-06

## 2018-04-06 VITALS
DIASTOLIC BLOOD PRESSURE: 62 MMHG | OXYGEN SATURATION: 96 % | RESPIRATION RATE: 16 BRPM | BODY MASS INDEX: 36.1 KG/M2 | SYSTOLIC BLOOD PRESSURE: 101 MMHG | WEIGHT: 230 LBS | HEART RATE: 59 BPM | TEMPERATURE: 97 F | HEIGHT: 67 IN

## 2018-04-06 DIAGNOSIS — G56.01 RIGHT CARPAL TUNNEL SYNDROME: Primary | ICD-10-CM

## 2018-04-06 DIAGNOSIS — M65.311 TRIGGER THUMB OF RIGHT HAND: ICD-10-CM

## 2018-04-06 PROCEDURE — 64721 CARPAL TUNNEL SURGERY: CPT | Mod: RT,NTX,, | Performed by: ORTHOPAEDIC SURGERY

## 2018-04-06 PROCEDURE — 36000706: Mod: NTX | Performed by: ORTHOPAEDIC SURGERY

## 2018-04-06 PROCEDURE — 36000707: Mod: TXP | Performed by: ORTHOPAEDIC SURGERY

## 2018-04-06 PROCEDURE — 26055 INCISE FINGER TENDON SHEATH: CPT | Mod: 51,F5,NTX, | Performed by: ORTHOPAEDIC SURGERY

## 2018-04-06 PROCEDURE — 25000003 PHARM REV CODE 250: Mod: TXP | Performed by: ORTHOPAEDIC SURGERY

## 2018-04-06 PROCEDURE — 25000003 PHARM REV CODE 250: Mod: TXP | Performed by: SPECIALIST

## 2018-04-06 PROCEDURE — S0077 INJECTION, CLINDAMYCIN PHOSP: HCPCS | Mod: NTX | Performed by: ORTHOPAEDIC SURGERY

## 2018-04-06 PROCEDURE — 37000009 HC ANESTHESIA EA ADD 15 MINS: Mod: TXP | Performed by: ORTHOPAEDIC SURGERY

## 2018-04-06 PROCEDURE — 71000015 HC POSTOP RECOV 1ST HR: Mod: TXP | Performed by: ORTHOPAEDIC SURGERY

## 2018-04-06 PROCEDURE — 25000003 PHARM REV CODE 250: Mod: NTX | Performed by: ORTHOPAEDIC SURGERY

## 2018-04-06 PROCEDURE — 63600175 PHARM REV CODE 636 W HCPCS: Mod: NTX | Performed by: NURSE ANESTHETIST, CERTIFIED REGISTERED

## 2018-04-06 PROCEDURE — 63600175 PHARM REV CODE 636 W HCPCS: Mod: TXP | Performed by: NURSE ANESTHETIST, CERTIFIED REGISTERED

## 2018-04-06 PROCEDURE — 37000008 HC ANESTHESIA 1ST 15 MINUTES: Mod: TXP | Performed by: ORTHOPAEDIC SURGERY

## 2018-04-06 RX ORDER — DIPHENHYDRAMINE HYDROCHLORIDE 50 MG/ML
25 INJECTION INTRAMUSCULAR; INTRAVENOUS EVERY 6 HOURS PRN
Status: DISCONTINUED | OUTPATIENT
Start: 2018-04-06 | End: 2018-04-06 | Stop reason: HOSPADM

## 2018-04-06 RX ORDER — FENTANYL CITRATE 50 UG/ML
25 INJECTION, SOLUTION INTRAMUSCULAR; INTRAVENOUS EVERY 5 MIN PRN
Status: DISCONTINUED | OUTPATIENT
Start: 2018-04-06 | End: 2018-04-06 | Stop reason: HOSPADM

## 2018-04-06 RX ORDER — LIDOCAINE HYDROCHLORIDE 10 MG/ML
INJECTION, SOLUTION EPIDURAL; INFILTRATION; INTRACAUDAL; PERINEURAL
Status: DISCONTINUED | OUTPATIENT
Start: 2018-04-06 | End: 2018-04-06 | Stop reason: HOSPADM

## 2018-04-06 RX ORDER — MIDAZOLAM HYDROCHLORIDE 1 MG/ML
INJECTION INTRAMUSCULAR; INTRAVENOUS
Status: DISCONTINUED | OUTPATIENT
Start: 2018-04-06 | End: 2018-04-06

## 2018-04-06 RX ORDER — MEPERIDINE HYDROCHLORIDE 50 MG/ML
12.5 INJECTION INTRAMUSCULAR; INTRAVENOUS; SUBCUTANEOUS ONCE AS NEEDED
Status: DISCONTINUED | OUTPATIENT
Start: 2018-04-06 | End: 2018-04-06 | Stop reason: HOSPADM

## 2018-04-06 RX ORDER — ACETAMINOPHEN 10 MG/ML
INJECTION, SOLUTION INTRAVENOUS
Status: DISCONTINUED | OUTPATIENT
Start: 2018-04-06 | End: 2018-04-06

## 2018-04-06 RX ORDER — SODIUM CHLORIDE 0.9 % (FLUSH) 0.9 %
3 SYRINGE (ML) INJECTION
Status: DISCONTINUED | OUTPATIENT
Start: 2018-04-06 | End: 2018-04-06 | Stop reason: HOSPADM

## 2018-04-06 RX ORDER — OXYCODONE HYDROCHLORIDE 5 MG/1
5 TABLET ORAL
Status: DISCONTINUED | OUTPATIENT
Start: 2018-04-06 | End: 2018-04-06 | Stop reason: HOSPADM

## 2018-04-06 RX ORDER — BUPIVACAINE HYDROCHLORIDE 2.5 MG/ML
INJECTION, SOLUTION EPIDURAL; INFILTRATION; INTRACAUDAL
Status: DISCONTINUED | OUTPATIENT
Start: 2018-04-06 | End: 2018-04-06 | Stop reason: HOSPADM

## 2018-04-06 RX ORDER — BACITRACIN ZINC 500 UNIT/G
OINTMENT (GRAM) TOPICAL
Status: DISCONTINUED | OUTPATIENT
Start: 2018-04-06 | End: 2018-04-06 | Stop reason: HOSPADM

## 2018-04-06 RX ORDER — PROPOFOL 10 MG/ML
VIAL (ML) INTRAVENOUS CONTINUOUS PRN
Status: DISCONTINUED | OUTPATIENT
Start: 2018-04-06 | End: 2018-04-06

## 2018-04-06 RX ORDER — ONDANSETRON 4 MG/1
4 TABLET, FILM COATED ORAL EVERY 8 HOURS PRN
Qty: 12 TABLET | Refills: 0 | Status: SHIPPED | OUTPATIENT
Start: 2018-04-06 | End: 2018-04-19

## 2018-04-06 RX ORDER — CLINDAMYCIN PHOSPHATE 900 MG/50ML
900 INJECTION, SOLUTION INTRAVENOUS
Status: COMPLETED | OUTPATIENT
Start: 2018-04-06 | End: 2018-04-06

## 2018-04-06 RX ORDER — PHENYLEPHRINE HYDROCHLORIDE 10 MG/ML
INJECTION INTRAVENOUS
Status: DISCONTINUED | OUTPATIENT
Start: 2018-04-06 | End: 2018-04-06

## 2018-04-06 RX ORDER — ONDANSETRON 2 MG/ML
INJECTION INTRAMUSCULAR; INTRAVENOUS
Status: DISCONTINUED | OUTPATIENT
Start: 2018-04-06 | End: 2018-04-06

## 2018-04-06 RX ORDER — PROPOFOL 10 MG/ML
VIAL (ML) INTRAVENOUS
Status: DISCONTINUED | OUTPATIENT
Start: 2018-04-06 | End: 2018-04-06

## 2018-04-06 RX ORDER — ONDANSETRON 2 MG/ML
4 INJECTION INTRAMUSCULAR; INTRAVENOUS DAILY PRN
Status: DISCONTINUED | OUTPATIENT
Start: 2018-04-06 | End: 2018-04-06 | Stop reason: HOSPADM

## 2018-04-06 RX ORDER — FENTANYL CITRATE 50 UG/ML
INJECTION, SOLUTION INTRAMUSCULAR; INTRAVENOUS
Status: DISCONTINUED | OUTPATIENT
Start: 2018-04-06 | End: 2018-04-06

## 2018-04-06 RX ORDER — ACETAMINOPHEN AND CODEINE PHOSPHATE 300; 30 MG/1; MG/1
1 TABLET ORAL
Qty: 20 TABLET | Refills: 0 | Status: SHIPPED | OUTPATIENT
Start: 2018-04-06 | End: 2018-04-19

## 2018-04-06 RX ORDER — LIDOCAINE HCL/PF 100 MG/5ML
SYRINGE (ML) INTRAVENOUS
Status: DISCONTINUED | OUTPATIENT
Start: 2018-04-06 | End: 2018-04-06

## 2018-04-06 RX ORDER — SODIUM CHLORIDE, SODIUM LACTATE, POTASSIUM CHLORIDE, CALCIUM CHLORIDE 600; 310; 30; 20 MG/100ML; MG/100ML; MG/100ML; MG/100ML
INJECTION, SOLUTION INTRAVENOUS CONTINUOUS PRN
Status: DISCONTINUED | OUTPATIENT
Start: 2018-04-06 | End: 2018-04-06

## 2018-04-06 RX ADMIN — PHENYLEPHRINE HYDROCHLORIDE 100 MCG: 10 INJECTION INTRAVENOUS at 08:04

## 2018-04-06 RX ADMIN — SODIUM CHLORIDE, SODIUM LACTATE, POTASSIUM CHLORIDE, AND CALCIUM CHLORIDE: 600; 310; 30; 20 INJECTION, SOLUTION INTRAVENOUS at 07:04

## 2018-04-06 RX ADMIN — PROPOFOL 20 MG: 10 INJECTION, EMULSION INTRAVENOUS at 08:04

## 2018-04-06 RX ADMIN — PROPOFOL 50 MCG/KG/MIN: 10 INJECTION, EMULSION INTRAVENOUS at 08:04

## 2018-04-06 RX ADMIN — FENTANYL CITRATE 50 MCG: 50 INJECTION, SOLUTION INTRAMUSCULAR; INTRAVENOUS at 08:04

## 2018-04-06 RX ADMIN — CLINDAMYCIN PHOSPHATE 900 MG: 18 INJECTION, SOLUTION INTRAVENOUS at 08:04

## 2018-04-06 RX ADMIN — FENTANYL CITRATE 25 MCG: 50 INJECTION, SOLUTION INTRAMUSCULAR; INTRAVENOUS at 08:04

## 2018-04-06 RX ADMIN — BUPIVACAINE HYDROCHLORIDE 10 ML: 2.5 INJECTION, SOLUTION EPIDURAL; INFILTRATION; INTRACAUDAL; PERINEURAL at 08:04

## 2018-04-06 RX ADMIN — LIDOCAINE HYDROCHLORIDE 10 ML: 10 INJECTION, SOLUTION EPIDURAL; INFILTRATION; INTRACAUDAL; PERINEURAL at 08:04

## 2018-04-06 RX ADMIN — BACITRACIN ZINC 7 G: 500 OINTMENT TOPICAL at 08:04

## 2018-04-06 RX ADMIN — LIDOCAINE HYDROCHLORIDE 50 MG: 20 INJECTION, SOLUTION INTRAVENOUS at 08:04

## 2018-04-06 RX ADMIN — ONDANSETRON 4 MG: 2 INJECTION INTRAMUSCULAR; INTRAVENOUS at 08:04

## 2018-04-06 RX ADMIN — MIDAZOLAM HYDROCHLORIDE 2 MG: 1 INJECTION, SOLUTION INTRAMUSCULAR; INTRAVENOUS at 07:04

## 2018-04-06 RX ADMIN — ACETAMINOPHEN 1000 MG: 10 INJECTION, SOLUTION INTRAVENOUS at 08:04

## 2018-04-06 NOTE — TELEPHONE ENCOUNTER
----- Message from Dc Grullon sent at 4/6/2018  1:50 PM CDT -----  Contact: Pt    Name of Who is Calling: Audrey      What is the request in detail: Pt inquiring whether he can take a benadryl to alleviate itching under ace bandage which was applied after surgery he received today. Pt states that it is uncomfortable and itching very bad but he is unable to get to site of itching.       Can the clinic reply by [facility]: No      [facility]: [phone] (home)

## 2018-04-06 NOTE — TELEPHONE ENCOUNTER
Spoke with pt sister , informed her to tell pt not to take splint off. He can take the benadryl for itching and irration. Informed her to have pt come in if he needed dressing changed.

## 2018-04-06 NOTE — OP NOTE
DATE OF PROCEDURE: 4/6/18  SERVICE: Orthopedics.   ATTENDING SURGEON: Barbara Zapata M.D.   ASSISTANT SURGEON: Karol REY  PREOPERATIVE DIAGNOSIS: right carpal tunnel syndrome.   Right thumb trigger finger  POSTOPERATIVE DIAGNOSIS: right carpal tunnel syndrome. Right thumb trigger finger  PROCEDURE: right carpal tunnel release.   Right thumb trigger release  ANESTHESIA: Local placed by surgeon and MAC.   FLUIDS: Lactated Ringers.   BLOOD LOSS: None. No blood was given.   TOURNIQUET TIME: 12 minutes.   PACKS AND DRAINS: None.   IMPLANTS: None.   SPECIMENS: None.   COMPLICATIONS: None.   INDICATIONS FOR PROCEDURE: Mr. Oneil is a 67-year-old male who had numbness   and tingling into her right hand. He has failed conservative treatment, EMG   was positive for carpal tunnel syndrome. He has also failed conservative treatment of right thumb trigger fingerTherefore, operative intervention was   deemed necessary. Risks and benefits were explained to the patient in clinic   since performed in clinic.   PROCEDURE IN DETAIL: After correct site was marked with the patient's   participation in the holding area, the patient was brought to the Operating   Room, placed in supine position, underwent MAC anesthesia. A well-padded   nonsterile tourniquet was placed on the right arm. A time-out was called for   correct site, procedure and patient to be indicated. Under sterile conditions,   an injection of lidocaine 1% was injected into the carpal tunnel space. The arm   was prepped and draped in normal sterile fashion. The incision was marked out   using Chinchilla cardinal lines. The arm was exsanguinated using Esmarch.   Tourniquet was insufflated to 250 mmHg and that is where it remained for a total   of 12 minutes. The incision was made down to the palmar fascia. The palmar   fascia was sharply incised. The transverse ligament was identified. It was   very thick in nature. It was sharply incised. Median nerve was identified. A    Mosquito hemostat was passed from the undersurface of the transverse ligament   proximally and distally to free it from the median nerve. Metzenbaum scissors   were utilized to cut the transverse ligament proximally and distally. Once that   was completely released, a Mosquito hemostat was passed again to confirm a   complete release. Once that was performed, the area was irrigated with copious   amounts of normal saline. Nylon closed the skin.   . Under sterile conditions,   an injection of lidocaine 1% without epinephrine was injected at the A1 pulley   space. The arm was prepped and draped in normal sterile fashion. Incision was   marked out in a longitudinal fashion along the pulley of the right tumb. The arm was   exsanguinated using Esmarch. Tourniquet was insufflated 250 mmHg and that is   where it remained for 10 minutes. The incision was made. Careful dissection   down was maintained to the A1 pulley. The neurovascular structures were   retracted out of the way. The A1 pulley was identified. It was sharply   incised. It was completely incised proximally and distally. Portion of    pulley was also incised. The tendon was then retracted out of the tendon sheath   using a right angle. The finger was placed through range of motion, showed it   did not trigger. The area was irrigated with copious amounts of normal saline.   Nylon closed the skin. Sterile dressing was applied. Tourniquet was deflated.   Brisk cap refill ensued. The patient was transferred the Recovery Room in   stable condition.   POSTOPERATIVE PLAN FOR THIS PATIENT: The patient is to keep the dressing clean, dry and   intact. We will take the sutures out at two weeks.

## 2018-04-06 NOTE — PLAN OF CARE
Audrey Oneil Jr. has met all discharge criteria from Phase II. Vital Signs are stable, ambulating  without difficulty. Discharge instructions given, patient verbalized understanding. Discharged from facility via wheelchair in stable condition.

## 2018-04-06 NOTE — INTERVAL H&P NOTE
The patient has been examined and the H&P has been reviewed:    I concur with the findings and no changes have occurred since H&P was written.    Anesthesia/Surgery risks, benefits and alternative options discussed and understood by patient/family.          Active Hospital Problems    Diagnosis  POA    Trigger thumb of right hand [M65.311]  Yes      Resolved Hospital Problems    Diagnosis Date Resolved POA   No resolved problems to display.

## 2018-04-06 NOTE — ANESTHESIA POSTPROCEDURE EVALUATION
"Anesthesia Post Evaluation    Patient: Audrey Oneil Jr.    Procedure(s) Performed: Procedure(s) (LRB):  RELEASE-CARPAL TUNNEL right, right thumb TFR (Right)  RELEASE-FINGER-TRIGGER right thumb (Right)    Final Anesthesia Type: general  Patient location during evaluation: [facility]  Patient participation: Yes- Able to Participate  Level of consciousness: awake and alert  Post-procedure vital signs: reviewed and stable  Pain management: adequate  Airway patency: patent  PONV status at discharge: No PONV  Anesthetic complications: no      Cardiovascular status: hemodynamically stable  Respiratory status: unassisted, spontaneous ventilation and room air  Hydration status: euvolemic  Follow-up not needed.        Visit Vitals  BP (!) 91/54 (BP Location: Left arm, Patient Position: Sitting)   Pulse 67   Temp 36.4 °C (97.6 °F) (Oral)   Resp 16   Ht 5' 7" (1.702 m)   Wt 104.3 kg (230 lb)   SpO2 97%   BMI 36.02 kg/m²       Pain/Magaly Score: Pain Assessment Performed: Yes (4/6/2018  6:25 AM)  Presence of Pain: complains of pain/discomfort (4/6/2018  6:25 AM)      "

## 2018-04-06 NOTE — BRIEF OP NOTE
Brief Operative Note     SUMMARY     Surgery Date: 4/6/2018     Surgeon(s) and Role:     * Barbara Zapata MD - Primary    Assisting Surgeon: None    Pre-op Diagnosis:  Right carpal tunnel syndrome [G56.01]  Trigger finger of right thumb [M65.311]    Post-op Diagnosis:  Right carpal tunnel syndrome [G56.01]  Trigger finger of right thumb [M65.311]    Procedure(s) (LRB):  RELEASE-CARPAL TUNNEL right, right thumb TFR (Right)  RELEASE-FINGER-TRIGGER right thumb (Right)    Anesthesia: Local MAC    Description of Procedure:   Right carpal tunnel release  Right thumb A1 pulley release    Findings/Key Components:  Right carpal tunnel release  Right thumb A1 pulley release    Estimated Blood Loss: Minimal         Specimens Removed:   Specimen (12h ago through future)    None          Discharge Note      SUMMARY     Admit Date: 4/6/2018    Attending Physician: Barbara Zapata MD     Discharge Physician: Barbara Zapata MD    Discharge Date: 4/6/2018     Final Diagnosis: Right carpal tunnel syndrome [G56.01]  Trigger finger of right thumb [M65.311]    Hospital Course: Patient was admitted for an outpatient procedure and tolerated the procedure well with no complications.    Disposition: Home or Self Care    Follow Up/Patient Instructions:   Current Discharge Medication List      START taking these medications    Details   acetaminophen-codeine 300-30mg (TYLENOL-CODEINE #3) 300-30 mg Tab Take 1 tablet by mouth every 4 to 6 hours as needed (moderate to severe pain).  Qty: 20 tablet, Refills: 0      docusate sodium (COLACE) 50 MG capsule Take 1 capsule (50 mg total) by mouth 2 (two) times daily.  Qty: 30 capsule, Refills: 0      ondansetron (ZOFRAN) 4 MG tablet Take 1 tablet (4 mg total) by mouth every 8 (eight) hours as needed for Nausea.  Qty: 12 tablet, Refills: 0         CONTINUE these medications which have NOT CHANGED    Details   allopurinol (ZYLOPRIM) 100 MG tablet Take 1 tablet (100 mg total) by mouth  once daily.  Qty: 90 tablet, Refills: 1    Associated Diagnoses: Idiopathic chronic gout of left foot without tophus      amiodarone (PACERONE) 200 MG Tab TAKE 1 AND 1/2 TABLETS(300 MG) BY MOUTH EVERY DAY  Qty: 135 tablet, Refills: 0    Associated Diagnoses: VT (ventricular tachycardia)      aspirin (ECOTRIN) 325 MG EC tablet Take 325 mg by mouth once daily.      !! atorvastatin (LIPITOR) 80 MG tablet TAKE 1 TABLET(80 MG) BY MOUTH EVERY DAY  Qty: 90 tablet, Refills: 0      furosemide (LASIX) 40 MG tablet TAKE 1 TABLET(40 MG) BY MOUTH EVERY DAY  Qty: 90 tablet, Refills: 0      hydrocodone-acetaminophen 10-325mg (NORCO)  mg Tab TK 1 T PO Q 8-10 H PRN P  Refills: 0      lisinopril (PRINIVIL,ZESTRIL) 5 MG tablet Take 1 tablet (5 mg total) by mouth once daily.  Qty: 90 tablet, Refills: 3    Associated Diagnoses: Chronic combined systolic and diastolic heart failure      metoprolol succinate (TOPROL-XL) 50 MG 24 hr tablet TAKE 1 TABLET BY MOUTH EVERY DAY  Qty: 90 tablet, Refills: 3      spironolactone (ALDACTONE) 25 MG tablet TAKE 1 TABLET(25 MG) BY MOUTH EVERY DAY  Qty: 90 tablet, Refills: 0    Associated Diagnoses: Chronic combined systolic and diastolic heart failure      !! atorvastatin (LIPITOR) 80 MG tablet TAKE 1 TABLET(80 MG) BY MOUTH EVERY DAY  Qty: 90 tablet, Refills: 0      clopidogrel (PLAVIX) 75 mg tablet Take 75 mg by mouth once daily.  Refills: 10       !! - Potential duplicate medications found. Please discuss with provider.        Follow-up Information     CANDIDO Novoa In 2 weeks.    Specialties:  Hand Surgery, Orthopedic Surgery  Why:  For suture removal, For wound re-check  Contact information:  [address] [address] [phone]                 Discharge Procedure Orders (must include Diet, Follow-up, Activity)    Discharge Procedure Orders (must include Diet, Follow-up, Activity)  Activity as tolerated     Keep surgical extremity elevated     Lifting  restrictions     Notify your health care provider if you experience any of the following:  temperature >100.4     Notify your health care provider if you experience any of the following:  severe uncontrolled pain     Notify your health care provider if you experience any of the following:  redness, tenderness, or signs of infection (pain, swelling, redness, odor or green/yellow discharge around incision site)     Notify your health care provider if you experience any of the following:  worsening rash     Leave dressing on - Keep it clean, dry, and intact until clinic visit            Certification of Assistant at Surgery       Surgery Date: 4/6/2018     Participating Surgeons:  Surgeon(s) and Role:     * Barbara Zapata MD - Primary    Procedures:  Procedure(s) (LRB):  RELEASE-CARPAL TUNNEL right, right thumb TFR (Right)  RELEASE-FINGER-TRIGGER right thumb (Right)    Assistant Surgeon's Certification of Necessity:  I understand that section 1842 (b) (6) (d) of the Social Security Act generally prohibits Medicare Part B reasonable charge payment for the services of assistants at surgery in teaching hospitals when qualified residents are available to furnish such services. I certify that the services for which payment is claimed were medically necessary, and that no qualified resident was available to perform the services. I further understand that these services are subject to post-payment review by the Medicare carrier.      CANDIDO Novoa    04/06/2018  8:54 AM

## 2018-04-06 NOTE — DISCHARGE INSTRUCTIONS
Discharge Instructions for Carpal Tunnel Release  You had a carpal tunnel release procedure to help relieve the symptoms of carpal tunnel syndrome. In carpal tunnel syndrome, a nerve in the wrist is compressed and irritated. This causes numbness and pain in the fingers and hand. Carpal tunnel release relieves the compression of the nerve. Here are instructions that will help you care for your arm and wrist when you are at home.  Home care  · Avoid gripping objects tightly or lifting with your affected arm.  · Wear your bandage, splint, or cast as directed by your doctor.  · Always keep the dressing, splint, or cast dry and clean.  · When showering, cover your hand and  wrist with plastic and use tape or rubberbands to keep the dressing, splint, or cast dry. Shower as necessary.    · Use an ice pack or bag of frozen peas -- or something similar -- wrapped in a thin towel on your wrist to reduce swelling for the first 48 hours. Leave the ice pack on for 20 minutes; then take it off for 20 minutes. Repeat as needed.  · Keep your arm elevated above your heart for 24 to 48 hours after surgery.  · Do the exercises you learned in the hospital, or as instructed by your doctor.  · Take pain medicine as directed.  · Dont drive until your doctor says its OK. Never drive while you are taking opioid pain medicine.  · Ask your doctor when you can return to work. If your job requires heavy lifting, you may not be able to begin working again for several weeks.  Follow-up care  Make a follow-up appointment as directed by your doctor.     When to seek medical care  Call 911 right away if you have any of the following:  · Chest pain  · Shortness of breath  Otherwise, call your doctor immediately if you have any of the following:  · A splint, cast, or dressing that has gotten wet  · Increased bleeding or drainage from the incision (cut)  · Opening of the incision  · Fever above 100.4°F (38.9°C) taken by mouth, or shaking  chills  · Any new numbness in the fingers or thumb  · Blue hand or fingers  · Increased pain with or without activity  · Increased redness, tenderness, or swelling of the incision   Date Last Reviewed: 11/15/2015  © 4259-7370 [employer]. [address] [place] [id number]. All rights reserved. This information is not intended as a substitute for professional medical care. Always follow your healthcare professional's instructions.        Anesthesia: Monitored Anesthesia Care (MAC)    Anesthesia Safety  · Have an adult family member or friend drive you home after the procedure.  · For the first 24 hours after your surgery:  ¨ Do not drive or use heavy equipment.  ¨ Do not make important decisions or sign documents.  ¨ Avoid alcohol.  ¨ Have someone stay with you, if possible. They can watch for problems and help keep you safe.

## 2018-04-12 ENCOUNTER — TELEPHONE (OUTPATIENT)
Dept: ORTHOPEDICS | Facility: CLINIC | Age: 68
End: 2018-04-12

## 2018-04-12 NOTE — TELEPHONE ENCOUNTER
----- Message from Karen Bowden sent at 4/12/2018  1:21 PM CDT -----  Contact: Self            Name of Who is Calling: Self      What is the request in detail: Pt states his dressing is dirty and coming loose. t wants to be advised on what he should do.      Can the clinic reply by [facility]: N      What Number to Call Back if not in [facility]: [phone]

## 2018-04-14 ENCOUNTER — HOSPITAL ENCOUNTER (EMERGENCY)
Facility: HOSPITAL | Age: 68
Discharge: HOME | End: 2018-04-14
Attending: FAMILY MEDICINE
Payer: MEDICARE

## 2018-04-14 VITALS
HEART RATE: 72 BPM | BODY MASS INDEX: 36.88 KG/M2 | RESPIRATION RATE: 16 BRPM | WEIGHT: 235 LBS | DIASTOLIC BLOOD PRESSURE: 59 MMHG | TEMPERATURE: 98 F | OXYGEN SATURATION: 96 % | HEIGHT: 67 IN | SYSTOLIC BLOOD PRESSURE: 132 MMHG

## 2018-04-14 DIAGNOSIS — I10 ESSENTIAL HYPERTENSION: ICD-10-CM

## 2018-04-14 DIAGNOSIS — Z95.810 AICD (AUTOMATIC CARDIOVERTER/DEFIBRILLATOR) PRESENT: ICD-10-CM

## 2018-04-14 DIAGNOSIS — Z48.00 DRESSING CHANGE: Primary | ICD-10-CM

## 2018-04-14 DIAGNOSIS — N18.30 CKD (CHRONIC KIDNEY DISEASE), STAGE III: ICD-10-CM

## 2018-04-14 PROCEDURE — 99282 EMERGENCY DEPT VISIT SF MDM: CPT | Mod: NTX,,, | Performed by: PHYSICIAN ASSISTANT

## 2018-04-14 PROCEDURE — 99281 EMR DPT VST MAYX REQ PHY/QHP: CPT | Mod: NTX

## 2018-04-14 NOTE — ED NOTES
Pt c/o having carpel tunnel and trigger finger surgery about 1 week ago. Pt states here to have dressing changed because feels as if every time he touches anything the dressing becomes dirty. Pt already had dressing changed once since surgery. Pt denies any pain to surgical site; Denies any tingling, numbness, or swelling. Cap refill less than 3 seconds. All PMS intact.     LOC: The patient is awake, alert, and oriented to place, time, situation. Affect is appropriate.  Speech is appropriate and clear.     APPEARANCE: Patient resting comfortably in no acute distress.  Patient is clean and well groomed.    SKIN: The skin is warm and dry; color consistent with ethnicity.  Patient has normal skin turgor and moist mucus membranes.  Skin intact; no breakdown or bruising noted.     MUSCULOSKELETAL: Patient moving upper and lower extremities without difficulty.  Denies weakness.     RESPIRATORY: Airway is open and patent. Respirations spontaneous, even, easy, and non-labored.  Patient has a normal effort and rate.  No accessory muscle use noted. Denies cough.     CARDIAC:  Normal rhythm and rate noted.  No peripheral edema noted. No complaints of chest pain.      ABDOMEN: Soft and non tender to palpation.  No distention noted.     NEUROLOGIC: Eyes open spontaneously.  Behavior appropriate to situation.  Follows commands; facial expression symmetrical.  Purposeful motor response noted; normal sensation in all extremities.

## 2018-04-14 NOTE — ED PROVIDER NOTES
Encounter Date: 4/14/2018       History     Chief Complaint   Patient presents with    Dressing Change     i had surg to my r hand and can't keep dressing clean     67-year-old male with extensive medical history including CHF, CAD, AICD presents for change of his dressing.  Patient had carpal tunnel/ trigger finger release surgery of right wrist performed on 4/6 by Dr. Zapata.  He is concerned because the dressing has become dirty and he was advised to not change it himself by his surgeon.  He is concerned about the wound becoming infected due to his other medical issues.  He denies increased pain to the incision sites, redness swelling pain or fever.           Review of patient's allergies indicates:   Allergen Reactions    Iodine containing multivitamin Swelling     itching    Keflex [cephalexin] Swelling     eyes    Peaches [peach (prunus persica)] Swelling     eyes    Shellfish containing products Swelling    Fig tree Swelling     itching    Tuberculin spenser test ppd Rash     Past Medical History:   Diagnosis Date    Chronic anticoagulation 5/5/2016    Chronic combined systolic and diastolic heart failure 11/26/2012    EF 10-20% on ECHO 2013    Clotting disorder     Coronary artery disease involving native coronary artery of native heart without angina pectoris 11/26/2012    Cath 10- Stents patent non-obstructive disease Cath 11-12015 non-obstructive disease     Diverticulosis of colon     DVT (deep venous thrombosis), unspecified laterality 11/12/2015    Essential hypertension 11/15/2015    Hyperlipidemia     Hypertensive heart disease with heart failure 5/5/2016    MI (myocardial infarction) 2009    x's 5    Nicotine abuse     Obesity 11/26/2012    Pulmonary embolism 2011    Stented coronary artery 11/26/2012    LAD stent placed 10/17/2007      Past Surgical History:   Procedure Laterality Date    APPENDECTOMY      BACK SURGERY      CARDIAC DEFIBRILLATOR PLACEMENT       CARDIAC SURGERY  2007    stent    r knee scope      SPINE SURGERY      TONSILLECTOMY       Social History   Substance Use Topics    Smoking status: Former Smoker     Packs/day: 1.00     Years: 45.00     Types: Cigarettes     Quit date: 12/14/2005    Smokeless tobacco: Never Used      Comment: 1-1.5 ppd every day.    Alcohol use No     Review of Systems   Constitutional: Negative for chills, fatigue and fever.   Respiratory: Negative for shortness of breath.    Cardiovascular: Negative for chest pain.   Gastrointestinal: Negative for abdominal pain.   Genitourinary: Negative for dysuria.   Musculoskeletal: Negative for joint swelling and myalgias.   Skin: Positive for wound. Negative for color change and rash.   Allergic/Immunologic: Negative for immunocompromised state.   Neurological: Negative for light-headedness and headaches.   Hematological: Does not bruise/bleed easily.       Physical Exam     Initial Vitals [04/14/18 1636]   BP Pulse Resp Temp SpO2   (!) 132/59 72 16 98.3 °F (36.8 °C) 96 %      MAP       83.33         Physical Exam    Nursing note and vitals reviewed.  Constitutional: He appears well-developed and well-nourished. He is not diaphoretic. No distress.   HENT:   Head: Normocephalic and atraumatic.   Eyes: EOM are normal. Pupils are equal, round, and reactive to light.   Neck: Normal range of motion. Neck supple.   Cardiovascular: Normal rate, regular rhythm, normal heart sounds and intact distal pulses. Exam reveals no friction rub.    No murmur heard.  Pulmonary/Chest: Breath sounds normal. No respiratory distress. He has no wheezes. He has no rhonchi. He has no rales. He exhibits no tenderness.   Abdominal: Soft. Bowel sounds are normal.   Musculoskeletal: Normal range of motion. He exhibits no edema or tenderness.   Neurological: He is alert and oriented to person, place, and time. He has normal strength.   Skin: Skin is warm and dry. Capillary refill takes 2 to 3 seconds. No abscess  noted. No erythema.   Incision sites clean and dry without drainage, erythema, edema, warmth or tenderness         ED Course   Procedures  Labs Reviewed - No data to display                APC / Resident Notes:   67-year-old male requesting dressing change after carpal tunnel release surgery on 4/6.  Dressing removed to reveal incision sites are clean and dry without drainage, erythema, edema or tenderness. No signs of infection on exam. Re-dressed hand and advised patient to keep clean and dry to the best of his ability.  Patient given additional gauze and dressing supplies. I do not believe he requires further ED treatment and is stable for discharge. He has a follow appointment with Dr. Zapata next week.  Discussed the importance of follow-up as well as ED return precautions.  Patient voiced understanding and is comfortable with discharge. I discussed this patient with my supervising physician.    Laureen Gonzalez PA-C                   Clinical Impression:   The encounter diagnosis was Dressing change.    Disposition:   Disposition: Discharged  Condition: Stable                        Laureen Gonzalez PA-C  04/14/18 2645

## 2018-04-14 NOTE — ED TRIAGE NOTES
Pt c/o having carpel tunnel and trigger finger surgery about 1 week ago. Pt states here to have dressing changed because feels as if every time he touches anything the dressing becomes dirty. Pt already had dressing changed once since surgery.

## 2018-04-18 DIAGNOSIS — I42.9 CARDIOMYOPATHY, UNSPECIFIED TYPE: Primary | ICD-10-CM

## 2018-04-19 ENCOUNTER — HOSPITAL ENCOUNTER (OUTPATIENT)
Dept: CARDIOLOGY | Facility: CLINIC | Age: 68
Discharge: HOME | End: 2018-04-19
Payer: MEDICARE

## 2018-04-19 ENCOUNTER — HOSPITAL ENCOUNTER (EMERGENCY)
Facility: HOSPITAL | Age: 68
Discharge: HOME | End: 2018-04-19
Attending: EMERGENCY MEDICINE
Payer: MEDICARE

## 2018-04-19 ENCOUNTER — CLINICAL SUPPORT (OUTPATIENT)
Dept: ELECTROPHYSIOLOGY | Facility: CLINIC | Age: 68
End: 2018-04-19
Attending: INTERNAL MEDICINE
Payer: MEDICARE

## 2018-04-19 ENCOUNTER — OFFICE VISIT (OUTPATIENT)
Dept: ELECTROPHYSIOLOGY | Facility: CLINIC | Age: 68
End: 2018-04-19
Payer: MEDICARE

## 2018-04-19 ENCOUNTER — OFFICE VISIT (OUTPATIENT)
Dept: ORTHOPEDICS | Facility: CLINIC | Age: 68
End: 2018-04-19
Payer: MEDICARE

## 2018-04-19 ENCOUNTER — TELEPHONE (OUTPATIENT)
Dept: TRANSPLANT | Facility: CLINIC | Age: 68
End: 2018-04-19

## 2018-04-19 VITALS
HEART RATE: 60 BPM | DIASTOLIC BLOOD PRESSURE: 63 MMHG | SYSTOLIC BLOOD PRESSURE: 110 MMHG | WEIGHT: 231 LBS | BODY MASS INDEX: 36.88 KG/M2 | BODY MASS INDEX: 36.26 KG/M2 | HEIGHT: 67 IN | HEART RATE: 62 BPM | SYSTOLIC BLOOD PRESSURE: 102 MMHG | OXYGEN SATURATION: 93 % | TEMPERATURE: 99 F | HEIGHT: 67 IN | WEIGHT: 235 LBS | RESPIRATION RATE: 20 BRPM | DIASTOLIC BLOOD PRESSURE: 57 MMHG | RESPIRATION RATE: 17 BRPM

## 2018-04-19 VITALS
OXYGEN SATURATION: 96 % | HEIGHT: 67 IN | SYSTOLIC BLOOD PRESSURE: 120 MMHG | HEART RATE: 60 BPM | WEIGHT: 231.94 LBS | BODY MASS INDEX: 36.4 KG/M2 | DIASTOLIC BLOOD PRESSURE: 58 MMHG

## 2018-04-19 DIAGNOSIS — Z86.711 HX PULMONARY EMBOLISM: ICD-10-CM

## 2018-04-19 DIAGNOSIS — Z79.899 HIGH RISK MEDICATIONS (NOT ANTICOAGULANTS) LONG-TERM USE: ICD-10-CM

## 2018-04-19 DIAGNOSIS — E78.5 HYPERLIPIDEMIA LDL GOAL <70: ICD-10-CM

## 2018-04-19 DIAGNOSIS — R58 ECCHYMOSIS: ICD-10-CM

## 2018-04-19 DIAGNOSIS — M1A.0720 IDIOPATHIC CHRONIC GOUT OF LEFT FOOT WITHOUT TOPHUS: ICD-10-CM

## 2018-04-19 DIAGNOSIS — I25.5 ISCHEMIC CARDIOMYOPATHY: ICD-10-CM

## 2018-04-19 DIAGNOSIS — I25.10 CORONARY ARTERY DISEASE INVOLVING NATIVE CORONARY ARTERY OF NATIVE HEART WITHOUT ANGINA PECTORIS: ICD-10-CM

## 2018-04-19 DIAGNOSIS — Z47.89 ORTHOPEDIC AFTERCARE: ICD-10-CM

## 2018-04-19 DIAGNOSIS — I70.0 ATHEROSCLEROSIS OF AORTA: ICD-10-CM

## 2018-04-19 DIAGNOSIS — M65.311 TRIGGER THUMB OF RIGHT HAND: ICD-10-CM

## 2018-04-19 DIAGNOSIS — Z95.810 AICD (AUTOMATIC CARDIOVERTER/DEFIBRILLATOR) PRESENT: ICD-10-CM

## 2018-04-19 DIAGNOSIS — T82.120A AICD LEAD DISPLACEMENT: ICD-10-CM

## 2018-04-19 DIAGNOSIS — I25.5 CARDIOMYOPATHY, ISCHEMIC: Primary | Chronic | ICD-10-CM

## 2018-04-19 DIAGNOSIS — E66.01 SEVERE OBESITY (BMI 35.0-39.9) WITH COMORBIDITY: Chronic | ICD-10-CM

## 2018-04-19 DIAGNOSIS — I44.7 LBBB (LEFT BUNDLE BRANCH BLOCK): ICD-10-CM

## 2018-04-19 DIAGNOSIS — Z86.718 HISTORY OF DVT (DEEP VEIN THROMBOSIS): Chronic | ICD-10-CM

## 2018-04-19 DIAGNOSIS — I10 ESSENTIAL HYPERTENSION: ICD-10-CM

## 2018-04-19 DIAGNOSIS — S20.212A CONTUSION OF LEFT CHEST WALL, INITIAL ENCOUNTER: Primary | ICD-10-CM

## 2018-04-19 DIAGNOSIS — N18.30 CKD (CHRONIC KIDNEY DISEASE), STAGE III: ICD-10-CM

## 2018-04-19 DIAGNOSIS — G56.01 RIGHT CARPAL TUNNEL SYNDROME: Primary | ICD-10-CM

## 2018-04-19 DIAGNOSIS — Z79.01 CHRONIC ANTICOAGULATION: ICD-10-CM

## 2018-04-19 DIAGNOSIS — R73.03 PREDIABETES: ICD-10-CM

## 2018-04-19 DIAGNOSIS — I50.42 CHRONIC COMBINED SYSTOLIC AND DIASTOLIC HEART FAILURE: Chronic | ICD-10-CM

## 2018-04-19 LAB
ALBUMIN SERPL BCP-MCNC: 3.4 G/DL
ALP SERPL-CCNC: 101 U/L
ALT SERPL W/O P-5'-P-CCNC: 32 U/L
ANION GAP SERPL CALC-SCNC: 9 MMOL/L
APTT BLDCRRT: 21.3 SEC
AST SERPL-CCNC: 20 U/L
BASOPHILS # BLD AUTO: 0.06 K/UL
BASOPHILS NFR BLD: 1 %
BILIRUB SERPL-MCNC: 0.4 MG/DL
BUN SERPL-MCNC: 38 MG/DL
CALCIUM SERPL-MCNC: 8.4 MG/DL
CHLORIDE SERPL-SCNC: 110 MMOL/L
CO2 SERPL-SCNC: 22 MMOL/L
CREAT SERPL-MCNC: 1.7 MG/DL
DIFFERENTIAL METHOD: ABNORMAL
EOSINOPHIL # BLD AUTO: 0.1 K/UL
EOSINOPHIL NFR BLD: 2 %
ERYTHROCYTE [DISTWIDTH] IN BLOOD BY AUTOMATED COUNT: 13.2 %
EST. GFR  (AFRICAN AMERICAN): 47.9 ML/MIN/1.73 M^2
EST. GFR  (NON AFRICAN AMERICAN): 41.4 ML/MIN/1.73 M^2
GLUCOSE SERPL-MCNC: 111 MG/DL
HCT VFR BLD AUTO: 35.5 %
HGB BLD-MCNC: 11.7 G/DL
IMM GRANULOCYTES # BLD AUTO: 0.06 K/UL
IMM GRANULOCYTES NFR BLD AUTO: 1 %
INR PPP: 0.9
LYMPHOCYTES # BLD AUTO: 1.9 K/UL
LYMPHOCYTES NFR BLD: 31.9 %
MCH RBC QN AUTO: 31.8 PG
MCHC RBC AUTO-ENTMCNC: 33 G/DL
MCV RBC AUTO: 97 FL
MONOCYTES # BLD AUTO: 0.7 K/UL
MONOCYTES NFR BLD: 12 %
NEUTROPHILS # BLD AUTO: 3.2 K/UL
NEUTROPHILS NFR BLD: 52.1 %
NRBC BLD-RTO: 0 /100 WBC
PLATELET # BLD AUTO: 221 K/UL
PMV BLD AUTO: 9.8 FL
POTASSIUM SERPL-SCNC: 3.9 MMOL/L
PROT SERPL-MCNC: 6.1 G/DL
PROTHROMBIN TIME: 9.6 SEC
RBC # BLD AUTO: 3.68 M/UL
SODIUM SERPL-SCNC: 141 MMOL/L
WBC # BLD AUTO: 6.08 K/UL

## 2018-04-19 PROCEDURE — 93000 ELECTROCARDIOGRAM COMPLETE: CPT | Mod: NTX,S$GLB,, | Performed by: INTERNAL MEDICINE

## 2018-04-19 PROCEDURE — 85610 PROTHROMBIN TIME: CPT | Mod: NTX

## 2018-04-19 PROCEDURE — 99284 EMERGENCY DEPT VISIT MOD MDM: CPT | Mod: 25,NTX

## 2018-04-19 PROCEDURE — 99999 PR PBB SHADOW E&M-EST. PATIENT-LVL IV: CPT | Mod: PBBFAC,TXP,, | Performed by: PHYSICIAN ASSISTANT

## 2018-04-19 PROCEDURE — 85025 COMPLETE CBC W/AUTO DIFF WBC: CPT | Mod: NTX

## 2018-04-19 PROCEDURE — 93005 ELECTROCARDIOGRAM TRACING: CPT | Mod: NTX

## 2018-04-19 PROCEDURE — 99499 UNLISTED E&M SERVICE: CPT | Mod: S$GLB,,, | Performed by: INTERNAL MEDICINE

## 2018-04-19 PROCEDURE — 93010 ELECTROCARDIOGRAM REPORT: CPT | Mod: NTX,,, | Performed by: INTERNAL MEDICINE

## 2018-04-19 PROCEDURE — 99283 EMERGENCY DEPT VISIT LOW MDM: CPT | Mod: ,,, | Performed by: EMERGENCY MEDICINE

## 2018-04-19 PROCEDURE — 93283 PRGRMG EVAL IMPLANTABLE DFB: CPT | Mod: NTX,S$GLB,, | Performed by: INTERNAL MEDICINE

## 2018-04-19 PROCEDURE — 80053 COMPREHEN METABOLIC PANEL: CPT | Mod: NTX

## 2018-04-19 PROCEDURE — 85730 THROMBOPLASTIN TIME PARTIAL: CPT | Mod: NTX

## 2018-04-19 PROCEDURE — 3074F SYST BP LT 130 MM HG: CPT | Mod: CPTII,NTX,S$GLB, | Performed by: INTERNAL MEDICINE

## 2018-04-19 PROCEDURE — 99999 PR PBB SHADOW E&M-EST. PATIENT-LVL III: CPT | Mod: PBBFAC,TXP,, | Performed by: INTERNAL MEDICINE

## 2018-04-19 PROCEDURE — 3078F DIAST BP <80 MM HG: CPT | Mod: CPTII,NTX,S$GLB, | Performed by: INTERNAL MEDICINE

## 2018-04-19 PROCEDURE — 99024 POSTOP FOLLOW-UP VISIT: CPT | Mod: S$GLB,TXP,, | Performed by: PHYSICIAN ASSISTANT

## 2018-04-19 PROCEDURE — 99205 OFFICE O/P NEW HI 60 MIN: CPT | Mod: NTX,S$GLB,, | Performed by: INTERNAL MEDICINE

## 2018-04-19 NOTE — ED PROVIDER NOTES
"Encounter Date: 4/19/2018       History     Chief Complaint   Patient presents with    AICD Problem     Pt states "I got a message today saying that I had an appointment in the morning to get an EKG. When I took my shirt off tonight I had abnormal bruising around my defibulator so I decided to come in." Pt denies any trauma to site. Denies CP and SOB.      67 M presents with left pectoral ecchymoses that he noticed while getting into the shower 1 hr prior to his arrival. He denies chest pain,dyspnea,LOC,nausea,vomiting,fever,chills,recent chest trauma or dizziness. The pt reports that he was left a voicemail today from the company that produces his device stating that he needed an EKG which made him "nervous" that led him to coming in for an evaluation.           Review of patient's allergies indicates:   Allergen Reactions    Iodine containing multivitamin Swelling     itching    Keflex [cephalexin] Swelling     eyes    Peaches [peach (prunus persica)] Swelling     eyes    Shellfish containing products Swelling    Fig tree Swelling     itching    Tuberculin spenser test ppd Rash     Past Medical History:   Diagnosis Date    Chronic anticoagulation 5/5/2016    Chronic combined systolic and diastolic heart failure 11/26/2012    EF 10-20% on ECHO 2013    Clotting disorder     Coronary artery disease involving native coronary artery of native heart without angina pectoris 11/26/2012    Cath 10- Stents patent non-obstructive disease Cath 11-12015 non-obstructive disease     Diverticulosis of colon     DVT (deep venous thrombosis), unspecified laterality 11/12/2015    Essential hypertension 11/15/2015    Hyperlipidemia     Hypertensive heart disease with heart failure 5/5/2016    MI (myocardial infarction) 2009    x's 5    Nicotine abuse     Obesity 11/26/2012    Pulmonary embolism 2011    Stented coronary artery 11/26/2012    LAD stent placed 10/17/2007      Past Surgical History:   Procedure " Laterality Date    APPENDECTOMY      BACK SURGERY      CARDIAC DEFIBRILLATOR PLACEMENT      CARDIAC SURGERY  2007    stent    r knee scope      SPINE SURGERY      TONSILLECTOMY       Family History   Problem Relation Age of Onset    Cancer Mother      colon cancer    Heart disease Mother     Diabetes Mother     Heart disease Father     Stroke Father     Colon polyps Father     Cancer Paternal Grandmother      leukemia    No Known Problems Sister     No Known Problems Daughter     No Known Problems Son     No Known Problems Sister     No Known Problems Son      Social History   Substance Use Topics    Smoking status: Former Smoker     Packs/day: 1.00     Years: 45.00     Types: Cigarettes     Quit date: 12/14/2005    Smokeless tobacco: Never Used      Comment: 1-1.5 ppd every day.    Alcohol use No     Review of Systems   Constitutional: Negative for activity change and appetite change.   HENT: Negative for facial swelling.    Eyes: Negative for pain.   Respiratory: Negative for shortness of breath.    Cardiovascular: Negative for chest pain.   Gastrointestinal: Negative for abdominal pain.   Genitourinary: Negative for dysuria.   Musculoskeletal: Negative for arthralgias and back pain.   Skin: Positive for color change.   Neurological: Negative for dizziness and numbness.   Hematological: Bruises/bleeds easily.   Psychiatric/Behavioral: Negative for confusion.   All other systems reviewed and are negative.      Physical Exam     Initial Vitals [04/19/18 0054]   BP Pulse Resp Temp SpO2   127/61 66 18 98.7 °F (37.1 °C) 95 %      MAP       83         Physical Exam    Nursing note and vitals reviewed.  Constitutional: Vital signs are normal. He appears well-developed and well-nourished.   HENT:   Head: Normocephalic and atraumatic.   Eyes: Conjunctivae and lids are normal.   Neck: Normal range of motion. Neck supple.   Cardiovascular: Normal rate, regular rhythm, S1 normal, S2 normal, normal heart  sounds, intact distal pulses and normal pulses.   Abdominal: Soft. Normal appearance and bowel sounds are normal.   Musculoskeletal: Normal range of motion.   Neurological: He is alert and oriented to person, place, and time. No cranial nerve deficit or sensory deficit.   Skin: Skin is warm and dry. Capillary refill takes less than 2 seconds.   Left pectoral ecchymosis   Psychiatric: He has a normal mood and affect. His speech is normal and behavior is normal. Judgment normal.       HO2    The pt is 67 M presenting with pectoral ecchymosis   DDx includes but is not limited to AICD displacement,blunt chest trauma,rib fracture,Bruising secondary to elevated INR    I performed a detailed H&P,ordered basic labs,Coags,CXR and placed the pt on cardiac monitor.  Radha Padron M.D  1:49 AM 4/19/18    HO2 UPDATE    Lab results did not reveal any acute abnormalities that required emergent intervention. CXR showed an AICD without any signs of physical damage or migration. The pt was informed of his results and instructed to follow up for his appointment tomorrow as scheduled. Vitals and mental status remain stable and unchanged.  Radha Padron MD  2:26AM 4/19/18    ED Course   Procedures  Labs Reviewed - No data to display                               Clinical Impression:   The primary encounter diagnosis was Contusion of left chest wall, initial encounter. Diagnoses of AICD (automatic cardioverter/defibrillator) present, Ecchymosis, and AICD lead displacement were also pertinent to this visit.                           Radha Padron MD  Resident  04/19/18 0225

## 2018-04-19 NOTE — PROGRESS NOTES
"Mr. Oneil is here today for a post-operative visit.  He is 13 days status post right carpal tunnel syndrome and right thumb trigger finger by Dr. Zapata on 4/6/18. He reports that he is doing well, no current hand pain. He takes Norco for other problems. He reports that his hand pain resolved and numbness is improved.  No triggering.  He denies fever, chills, and sweats since the time of the surgery.     Physical exam:    Vitals:    04/19/18 1423   BP: 102/63   Pulse: 62   Resp: 20   Weight: 104.8 kg (231 lb)   Height: 5' 7" (1.702 m)   PainSc: 0-No pain   PainLoc: Hand     Vital signs are stable, patient is afebrile.  Patient is well dressed and well groomed, no acute distress.  Alert and oriented to person, place, and time.  Post op dressing taken down.  Incision is clean, dry and intact.  There is no erythema or exudate.  There is no sign of any infection. He is NVI. Sutures removed without difficulty.      Assessment: 13 days status post right carpal tunnel syndrome and right thumb trigger finger    Plan:  Audrey was seen today for post-op evaluation.    Diagnoses and all orders for this visit:    Right carpal tunnel syndrome    Trigger thumb of right hand    Orthopedic aftercare        - PO instruction reviewed and provided to patient  - Gel brace provided  - Finger range of motion handout provided  - Follow-up in 4 weeks if needed  - Discussed scar massage  - Call with questions or concerns    "

## 2018-04-19 NOTE — ED PROVIDER NOTES
"CC: AICD Problem (Pt states "I got a message today saying that I had an appointment in the morning to get an EKG. When I took my shirt off tonight I had abnormal bruising around my defibulator so I decided to come in." Pt denies any trauma to site. Denies CP and SOB. )      HPI: Audrey Oneil Jr. is a 67 y.o. year old male who presents to the ED with an AICD problem.   Pt states he received a text from his defibrillator company telling him that he needed an EKG the next day. He then noticed a bruise that is tender and it made him nervous so he came in. He states that he has had 5 MI's and a PE. He is on Plavix and he does take aspirin. Denies SOB, chest pains, and light headedness. First noticed the bruise about 25 minutes ago at 0040.         Past Medical History:   Diagnosis Date    Chronic anticoagulation 5/5/2016    Chronic combined systolic and diastolic heart failure 11/26/2012    EF 10-20% on ECHO 2013    Clotting disorder     Coronary artery disease involving native coronary artery of native heart without angina pectoris 11/26/2012    Cath 10- Stents patent non-obstructive disease Cath 11-12015 non-obstructive disease     Diverticulosis of colon     DVT (deep venous thrombosis), unspecified laterality 11/12/2015    Essential hypertension 11/15/2015    Hyperlipidemia     Hypertensive heart disease with heart failure 5/5/2016    MI (myocardial infarction) 2009    x's 5    Nicotine abuse     Obesity 11/26/2012    Pulmonary embolism 2011    Stented coronary artery 11/26/2012    LAD stent placed 10/17/2007      Past Surgical History:   Procedure Laterality Date    APPENDECTOMY      BACK SURGERY      CARDIAC DEFIBRILLATOR PLACEMENT      CARDIAC SURGERY  2007    stent    r knee scope      SPINE SURGERY      TONSILLECTOMY       Family History   Problem Relation Age of Onset    Cancer Mother      colon cancer    Heart disease Mother     Diabetes Mother     Heart disease Father     " Stroke Father     Colon polyps Father     Cancer Paternal Grandmother      leukemia    No Known Problems Sister     No Known Problems Daughter     No Known Problems Son     No Known Problems Sister     No Known Problems Son      No current facility-administered medications on file prior to encounter.      Current Outpatient Prescriptions on File Prior to Encounter   Medication Sig Dispense Refill    acetaminophen-codeine 300-30mg (TYLENOL-CODEINE #3) 300-30 mg Tab Take 1 tablet by mouth every 4 to 6 hours as needed (moderate to severe pain). 20 tablet 0    allopurinol (ZYLOPRIM) 100 MG tablet Take 1 tablet (100 mg total) by mouth once daily. 90 tablet 1    amiodarone (PACERONE) 200 MG Tab TAKE 1 AND 1/2 TABLETS(300 MG) BY MOUTH EVERY  tablet 0    aspirin (ECOTRIN) 325 MG EC tablet Take 325 mg by mouth once daily.      atorvastatin (LIPITOR) 80 MG tablet TAKE 1 TABLET(80 MG) BY MOUTH EVERY DAY 90 tablet 0    atorvastatin (LIPITOR) 80 MG tablet TAKE 1 TABLET(80 MG) BY MOUTH EVERY DAY 90 tablet 0    clopidogrel (PLAVIX) 75 mg tablet Take 75 mg by mouth once daily.  10    furosemide (LASIX) 40 MG tablet TAKE 1 TABLET(40 MG) BY MOUTH EVERY DAY 90 tablet 0    hydrocodone-acetaminophen 10-325mg (NORCO)  mg Tab TK 1 T PO Q 8-10 H PRN P  0    lisinopril (PRINIVIL,ZESTRIL) 5 MG tablet Take 1 tablet (5 mg total) by mouth once daily. 90 tablet 3    metoprolol succinate (TOPROL-XL) 50 MG 24 hr tablet TAKE 1 TABLET BY MOUTH EVERY DAY 90 tablet 3    spironolactone (ALDACTONE) 25 MG tablet TAKE 1 TABLET(25 MG) BY MOUTH EVERY DAY 90 tablet 0    docusate sodium (COLACE) 50 MG capsule Take 1 capsule (50 mg total) by mouth 2 (two) times daily. 30 capsule 0    ondansetron (ZOFRAN) 4 MG tablet Take 1 tablet (4 mg total) by mouth every 8 (eight) hours as needed for Nausea. 12 tablet 0     Iodine containing multivitamin; Keflex [cephalexin]; Peaches [peach (prunus persica)]; Shellfish containing products;  Fig tree; and Tuberculin spenser test ppd  Social History     Social History    Marital status:      Spouse name: N/A    Number of children: 2    Years of education: N/A     Occupational History    [employer]      unemployed     Social History Main Topics    Smoking status: Former Smoker     Packs/day: 1.00     Years: 45.00     Types: Cigarettes     Quit date: 12/14/2005    Smokeless tobacco: Never Used      Comment: 1-1.5 ppd every day.    Alcohol use No    Drug use: No    Sexual activity: No     Other Topics Concern    None     Social History Narrative    None       ROS:     Constitutional : neg  HEENT neg  Resp neg  Cardiac  + defibrillator   GI neg   neg  Neuro neg  Heme/Immune: neg  Endo neg  Skin + ecchymosis to left shoulder/ upper chest    PHYSICAL EXAM:  Vitals:    04/19/18 0203   BP: (!) 110/57   Pulse: 60   Resp: 17   Temp: 98.6 °F (37 °C)         PHYSICAL EXAM:   general: comfortable, in no acute distress  VS: triage VS reviewed  HEENT: NC/AT, PERRL, EOMI  Neck: trachea midline  CV: RRR, no m/r/g, no LE pitting edema, + small ecchymosis just to the left of the AICD pocket, no ttp over the AICD pocket  Resp: CTAB  ABD:  Obese, ND, + normal BS, NT  Renal: No CVAT  Neuro: AAO x 3, 5/5 muscle strength in upper and lower extremities, sensation grossly intact, face symmetric, speech normal  Ext: no deformity, +2 symmetrical peripheral pulses          DATA & INTERVENTIONS:    LABS reviewed:  Labs Reviewed   CBC W/ AUTO DIFFERENTIAL - Abnormal; Notable for the following:        Result Value    RBC 3.68 (*)     Hemoglobin 11.7 (*)     Hematocrit 35.5 (*)     MCH 31.8 (*)     Immature Granulocytes 1.0 (*)     Immature Grans (Abs) 0.06 (*)     All other components within normal limits   COMPREHENSIVE METABOLIC PANEL - Abnormal; Notable for the following:     CO2 22 (*)     Glucose 111 (*)     BUN, Bld 38 (*)     Creatinine 1.7 (*)     Calcium 8.4 (*)     Albumin 3.4 (*)     eGFR if African  American 47.9 (*)     eGFR if non  41.4 (*)     All other components within normal limits   APTT   PROTIME-INR       RADIOLOGY reviewed:  Imaging Results          X-Ray Chest PA And Lateral (Final result)  Result time 04/19/18 02:03:30    Final result by Jefe Reyes MD (04/19/18 02:03:30)                 Impression:      No acute cardiopulmonary finding or detrimental change when compared with 08/11/2017.      Electronically signed by: Jefe Reyes MD  Date:    04/19/2018  Time:    02:03             Narrative:    EXAMINATION:  XR CHEST PA AND LATERAL    CLINICAL HISTORY:  Hemorrhage, not elsewhere classified    TECHNIQUE:  Frontal and lateral views of the chest were performed.    COMPARISON:  08/11/2017.    FINDINGS:  Cardiac silhouette is mildly enlarged but stable.  Left chest wall AICD with transvenous leads overlying the heart, grossly stable in position.  No focal consolidation.  No sizable pleural effusion.  No pneumothorax.  No detrimental change.                                MEDICATIONS/FLUIDS:  Medications - No data to display      MDM: 66 yo m here as he noticed ecchymosis in close proximity to the AICD pocket, he does not remember any injury to the chets. No cp/sob/lightheadedness, no shock from the AICD  cxr with AICD leads stable in position, ekg no gross changes from prior  Advised to monitor closely at the home the area of ecchymosis and to return if enlarging/palpitations/chest pain    Chart reviewed:AICD for ischemic cardiomyopathy. June 2017 echo with LVF of 20%.     EKG: hr 65 NSR, LBBB, seen previously on EKG from Nov 2017        IMPRESSION:  1.) small ecchymosis in close proximity to the AICD pocket      Dispo: Discharge    Critical Care Time: N/A       Nara Lopez MD  04/19/18 1736

## 2018-04-19 NOTE — TELEPHONE ENCOUNTER
Pt no-show to clinic last week.   Left voicemail requesting call back to reschedule. Will await return call.

## 2018-04-19 NOTE — PROGRESS NOTES
Subjective:   Patient ID:  Audrey Oneil Jr. is a 67 y.o. male     Chief complaint:Pacemaker Check      HPI  Background as recorded in Dr Marshall's recent note (1/12/18):  Mr. Oneil is a very pleasant 67 y.o. male with a hx of CAD s/p STEMI (s/p PCI LAD 2007), CHF/ICM and remote MI, quit smoking Dec 2015,  PE (2010, s/p 1-yr coumadin), HTN, DLP and s/p ICD [employer] placement due to VT who comes for a regular follow-up. He was last seen by Dr. Marshall 6/12/17 and had RHC 7/10/17 (see below). He has been declining over the last several months and most recent CPX (2/15/17) that showed low Peak VO2 of 7.5ml/kg/min but AT was not attained and RER was only 0.67 (poor effort). Most recent 2D echo showed severely depressed LV function with an EF=10-15%, LV with a LVEDD of 5.9  cm, normal RV size and function. Clinically reports NYHA class II-III symptoms.  RHC done showed normal left and right sided filling presssures. Low normal CO/CI.      Today, he reports doing well. NYHA Class II-III still as he is participating well with rehab. He has lost 5-6lbs over last 3 months and is trying to eat right and exercise. No HF readmissions and no cardiopulmonary complaints tdoay. He had a recent ER visit for gout flare but pain has resolved.      RHC 7/2017:  RA: 5/4 (1)  RV: 31/-6  PW:  (6)  PA: 32/2 (15)  AO: 120/64 (91)  PA_SAT: 64    CONDITION 1:  FICKCI: 2.1400  FICKCO: 4.4646    Update since then:  He had a bruise on his ICD pocket and was worried about it - so he came to see us.  He has a LBBB that started developing in 11/2015 and with slowly gradually widening QRS duration (124 in 11/2015 and now 140). His EF however has been as low as 10% 4 years ago.   I have reviewed the actual image of the ECG tracing obtained today and it shows NSR with LBBB and QRSd 140      Current Outpatient Prescriptions   Medication Sig    allopurinol (ZYLOPRIM) 100 MG tablet Take 1 tablet (100 mg total) by mouth once daily.    amiodarone  (PACERONE) 200 MG Tab TAKE 1 AND 1/2 TABLETS(300 MG) BY MOUTH EVERY DAY    aspirin (ECOTRIN) 325 MG EC tablet Take 325 mg by mouth once daily.    atorvastatin (LIPITOR) 80 MG tablet TAKE 1 TABLET(80 MG) BY MOUTH EVERY DAY    clopidogrel (PLAVIX) 75 mg tablet Take 75 mg by mouth once daily.    docusate sodium (COLACE) 50 MG capsule Take 1 capsule (50 mg total) by mouth 2 (two) times daily.    furosemide (LASIX) 40 MG tablet TAKE 1 TABLET(40 MG) BY MOUTH EVERY DAY    hydrocodone-acetaminophen 10-325mg (NORCO)  mg Tab TK 1 T PO Q 8-10 H PRN P    lisinopril (PRINIVIL,ZESTRIL) 5 MG tablet Take 1 tablet (5 mg total) by mouth once daily.    metoprolol succinate (TOPROL-XL) 50 MG 24 hr tablet TAKE 1 TABLET BY MOUTH EVERY DAY    spironolactone (ALDACTONE) 25 MG tablet TAKE 1 TABLET(25 MG) BY MOUTH EVERY DAY     No current facility-administered medications for this visit.      Review of Systems   Constitution: Positive for malaise/fatigue. Negative for decreased appetite, weakness, weight gain and weight loss.   Eyes: Negative for blurred vision.   Cardiovascular: Negative for chest pain, claudication, cyanosis, dyspnea on exertion, irregular heartbeat, leg swelling, near-syncope, orthopnea and palpitations.   Respiratory: Positive for wheezing. Negative for cough, shortness of breath, sleep disturbances due to breathing and snoring.    Endocrine: Negative for heat intolerance.   Hematologic/Lymphatic: Does not bruise/bleed easily.   Musculoskeletal: Positive for joint pain. Negative for muscle weakness and myalgias.   Gastrointestinal: Positive for constipation. Negative for melena, nausea and vomiting.   Genitourinary: Negative for nocturia.   Neurological: Negative for excessive daytime sleepiness, dizziness, headaches and light-headedness.   Psychiatric/Behavioral: Negative for depression, memory loss and substance abuse. The patient has insomnia. The patient is not nervous/anxious.        Objective:    Physical Exam   Constitutional: He is oriented to person, place, and time. He appears well-developed and well-nourished.   overweight   HENT:   Head: Normocephalic and atraumatic.   Right Ear: External ear normal.   Left Ear: External ear normal.   Eyes: Conjunctivae are normal. Pupils are equal, round, and reactive to light. Left conjunctiva is not injected. Left conjunctiva has no hemorrhage.   Neck: Neck supple. No JVD present. No thyromegaly present.   Cardiovascular: Normal rate, regular rhythm, normal heart sounds and intact distal pulses.  PMI is not displaced.  Exam reveals no gallop, no friction rub, no midsystolic click and no opening snap.    No murmur heard.  Pulses:       Carotid pulses are 2+ on the right side, and 2+ on the left side.       Radial pulses are 2+ on the right side, and 2+ on the left side.        Dorsalis pedis pulses are 2+ on the right side, and 2+ on the left side.        Posterior tibial pulses are 2+ on the right side, and 2+ on the left side.   Pulmonary/Chest: Effort normal and breath sounds normal. No respiratory distress. He has no wheezes. He has no rales. He exhibits no tenderness.   Device pocket is in excellent repair with small bruise (superficial) at the lateral inferior corner of the pocket.     Abdominal: Soft. Normal appearance. He exhibits no pulsatile liver. There is no hepatomegaly. There is no tenderness. There is no rigidity and no guarding.   Obese abdomen   Musculoskeletal: Normal range of motion. He exhibits no edema or tenderness.        Right knee: He exhibits no swelling.        Left knee: He exhibits no swelling.        Right ankle: He exhibits no swelling.        Left ankle: He exhibits no swelling.        Right lower leg: He exhibits no swelling.        Left lower leg: He exhibits no swelling.        Right foot: There is no swelling.        Left foot: There is no swelling.   Neurological: He is alert and oriented to person, place, and time. He has  "normal strength and normal reflexes. No cranial nerve deficit. Coordination normal.   Skin: Skin is warm, dry and intact. No rash noted. No cyanosis. No pallor.   Psychiatric: He has a normal mood and affect. His behavior is normal.   Nursing note and vitals reviewed.    BP (!) 120/58   Pulse 60   Ht 5' 7" (1.702 m)   Wt 105.2 kg (231 lb 14.8 oz)   SpO2 96%   BMI 36.32 kg/m²      Assessment:      1. Cardiomyopathy, ischemic    2. AICD (automatic cardioverter/defibrillator) present    3. Severe obesity (BMI 35.0-39.9) with comorbidity    4. Prediabetes    5. LBBB (left bundle branch block)    6. Chronic anticoagulation    7. Atherosclerosis of aorta    8. Chronic combined systolic and diastolic heart failure    9. CKD (chronic kidney disease), stage III    10. Coronary artery disease involving native coronary artery of native heart without angina pectoris    11. Essential hypertension    12. High risk medications (amiodarone) long-term use    13. History of DVT (deep vein thrombosis)    14. Hx pulmonary embolism 2007 (estimate)    15. Hyperlipidemia LDL goal <70    16. Idiopathic chronic gout of left foot without tophus        Plan:    Also needs amio tox screen  He may benefit from upgrade to CRT as he is developing a reasonably wide QRS-- this might prevent further functional deteioration (as opposed to be designed to reverse a long established severe ICM). Will eval for dyssynchrony etc  Orders Placed This Encounter   Procedures    Brain natriuretic peptide     Standing Status:   Future     Number of Occurrences:   1     Standing Expiration Date:   6/18/2019    TSH     Standing Status:   Future     Standing Expiration Date:   4/22/2019    Amiodarone level     Standing Status:   Future     Standing Expiration Date:   4/22/2019    Hemoglobin     Standing Status:   Future     Standing Expiration Date:   6/21/2019    2D echo with color flow doppler     Please analyze and report on TDIs  Tx     Standing " Status:   Future     Standing Expiration Date:   4/19/2019    Complete PFT with bronchodilator     Standing Status:   Future     Standing Expiration Date:   4/22/2019     Follow-up in about 1 year (around 4/19/2019), or if symptoms worsen or fail to improve.  Medications Discontinued During This Encounter   Medication Reason    acetaminophen-codeine 300-30mg (TYLENOL-CODEINE #3) 300-30 mg Tab Patient no longer taking    ondansetron (ZOFRAN) 4 MG tablet Patient no longer taking    atorvastatin (LIPITOR) 80 MG tablet Duplicate Order     New Prescriptions    No medications on file     Modified Medications    No medications on file

## 2018-04-19 NOTE — ED TRIAGE NOTES
Pt arrived to ED c/o tender painful bruising that he noticed today on his upper left chest. Pt has an AICD implanted 7ys ago. States that he got message from the defibrillator company stating to come be seen for an EKG. Endorses some CP, and some SOB. Denies any recent injury/trauma to the chest.

## 2018-04-19 NOTE — Clinical Note
Tania and Angella, Please make sure that the plan is implemeted -- I believe things may not have been scheduled yet Tx

## 2018-04-19 NOTE — ED NOTES
LOC: Patient name and date of birth verified.  The patient is awake, alert and aware of environment with an appropriate affect, the patient is oriented x 3 and speaking appropriately.  Pt in NAD.    APPEARANCE: Patient resting comfortably and in no acute distress, patient is clean and well groomed, patient's clothing is properly fastened.  SKIN: The skin is warm and dry, color consistent with ethnicity, patient has normal skin turgor and moist mucus membranes, skin intact, no breakdown noted. Pt has bruising to left upper chest wall.  MUSCULOSKELETAL: Patient moving all extremities well, no obvious swelling or deformities noted.  RESPIRATORY: Airway is open and patent, respirations are spontaneous, patient has a normal effort and rate, no accessory muscle use noted.  CARDIAC: Patient has a normal rate and rhythm, no periphreal edema noted, capillary refill < 3 seconds. Pt has an AICD implant  ABDOMEN: Soft and non tender to palpation, no distention noted. Bowel sounds present in all four quadrants.  NEUROLOGIC: Eyes open spontaneously, behavior appropriate to situation, follows commands, facial expression symmetrical, bilateral hand grasp equal and even, purposeful motor response noted, normal sensation in all extremities when touched with a finger.

## 2018-04-22 DIAGNOSIS — I25.5 CARDIOMYOPATHY, ISCHEMIC: Primary | ICD-10-CM

## 2018-04-22 PROBLEM — I44.7 LBBB (LEFT BUNDLE BRANCH BLOCK): Status: ACTIVE | Noted: 2018-04-22

## 2018-04-27 ENCOUNTER — HOSPITAL ENCOUNTER (OUTPATIENT)
Dept: CARDIOLOGY | Facility: CLINIC | Age: 68
Discharge: HOME | End: 2018-04-27
Attending: INTERNAL MEDICINE
Payer: MEDICARE

## 2018-04-27 DIAGNOSIS — I25.5 CARDIOMYOPATHY, ISCHEMIC: Chronic | ICD-10-CM

## 2018-04-27 PROCEDURE — 96374 THER/PROPH/DIAG INJ IV PUSH: CPT | Mod: 59,NTX,S$GLB, | Performed by: INTERNAL MEDICINE

## 2018-04-27 PROCEDURE — 93306 TTE W/DOPPLER COMPLETE: CPT | Mod: NTX,S$GLB,, | Performed by: INTERNAL MEDICINE

## 2018-04-27 NOTE — PROGRESS NOTES
Patient identified via spelling of name and date of birth. Patient denies blood transfusion reaction. Consent obtained for use of contrast. Optison 3ml IVP vorsiddhartha Dixon. 22 gauge saline lock started in right forearm under aseptic technique. Optison 3ml IVP used as contrast for 2 d imaging. Saline lock d/oliver and pressure dressing applied. Tolerated procedure well.

## 2018-04-30 LAB
DIASTOLIC DYSFUNCTION: YES
ESTIMATED PA SYSTOLIC PRESSURE: 25.28
MITRAL VALVE REGURGITATION: ABNORMAL
RETIRED EF AND QEF - SEE NOTES: 20 (ref 55–65)
TRICUSPID VALVE REGURGITATION: ABNORMAL

## 2018-05-04 ENCOUNTER — HOSPITAL ENCOUNTER (OUTPATIENT)
Dept: PULMONOLOGY | Facility: CLINIC | Age: 68
Discharge: HOME | End: 2018-05-04
Payer: MEDICARE

## 2018-05-04 DIAGNOSIS — Z79.899 HIGH RISK MEDICATIONS (NOT ANTICOAGULANTS) LONG-TERM USE: ICD-10-CM

## 2018-05-04 LAB
PRE FEV1 FVC: 71
PRE FEV1: 2.09
PRE FVC: 2.93
PREDICTED FEV1 FVC: 80
PREDICTED FEV1: 2.75
PREDICTED FVC: 3.42

## 2018-05-04 PROCEDURE — 94010 BREATHING CAPACITY TEST: CPT | Mod: NTX,S$GLB,, | Performed by: INTERNAL MEDICINE

## 2018-05-04 PROCEDURE — 94729 DIFFUSING CAPACITY: CPT | Mod: NTX,S$GLB,, | Performed by: INTERNAL MEDICINE

## 2018-05-10 RX ORDER — FUROSEMIDE 40 MG/1
TABLET ORAL
Qty: 90 TABLET | Refills: 0 | Status: SHIPPED | OUTPATIENT
Start: 2018-05-10 | End: 2018-08-07 | Stop reason: SDUPTHER

## 2018-05-11 ENCOUNTER — TELEPHONE (OUTPATIENT)
Dept: ELECTROPHYSIOLOGY | Facility: CLINIC | Age: 68
End: 2018-05-11

## 2018-05-21 ENCOUNTER — TELEPHONE (OUTPATIENT)
Dept: ELECTROPHYSIOLOGY | Facility: CLINIC | Age: 68
End: 2018-05-21

## 2018-06-05 ENCOUNTER — HOSPITAL ENCOUNTER (EMERGENCY)
Facility: HOSPITAL | Age: 68
Discharge: HOME | End: 2018-06-05
Attending: EMERGENCY MEDICINE
Payer: MEDICARE

## 2018-06-05 VITALS
HEIGHT: 67 IN | OXYGEN SATURATION: 96 % | DIASTOLIC BLOOD PRESSURE: 54 MMHG | SYSTOLIC BLOOD PRESSURE: 96 MMHG | WEIGHT: 230 LBS | TEMPERATURE: 98 F | BODY MASS INDEX: 36.1 KG/M2 | HEART RATE: 71 BPM | RESPIRATION RATE: 18 BRPM

## 2018-06-05 DIAGNOSIS — L73.9 FOLLICULITIS OF LEFT AXILLA: Primary | ICD-10-CM

## 2018-06-05 PROCEDURE — 99283 EMERGENCY DEPT VISIT LOW MDM: CPT | Mod: NTX,,, | Performed by: PHYSICIAN ASSISTANT

## 2018-06-05 PROCEDURE — 99283 EMERGENCY DEPT VISIT LOW MDM: CPT | Mod: NTX

## 2018-06-05 RX ORDER — CLINDAMYCIN HYDROCHLORIDE 150 MG/1
300 CAPSULE ORAL EVERY 8 HOURS
Qty: 42 CAPSULE | Refills: 0 | Status: SHIPPED | OUTPATIENT
Start: 2018-06-05 | End: 2018-06-12

## 2018-06-05 RX ORDER — NEOMYCIN SULFATE, POLYMYXIN B SULFATE, BACITRACIN ZINC, HYDROCORTISONE 3.5; 10000; 400; 1 MG/G; [USP'U]/G; [USP'U]/G; MG/G
OINTMENT OPHTHALMIC 2 TIMES DAILY
Qty: 15 G | Refills: 0 | Status: SHIPPED | OUTPATIENT
Start: 2018-06-05 | End: 2018-06-26

## 2018-06-05 RX ORDER — MUPIROCIN 20 MG/G
OINTMENT TOPICAL 3 TIMES DAILY
Qty: 1 TUBE | Refills: 0 | Status: SHIPPED | OUTPATIENT
Start: 2018-06-05 | End: 2018-06-26

## 2018-06-05 RX ORDER — CLINDAMYCIN HYDROCHLORIDE 150 MG/1
300 CAPSULE ORAL EVERY 8 HOURS
Qty: 42 CAPSULE | Refills: 0 | Status: SHIPPED | OUTPATIENT
Start: 2018-06-05 | End: 2018-06-05

## 2018-06-05 NOTE — ED NOTES
Patient identifiers have been checked and are correct.  Patient assisted to ED stretcher and placed in a hospital gown.     LOC: The patient is awake, alert, and aware of environment. The patient is oriented x 3 and speaking appropriately.   APPEARANCE: No acute distress noted.   PSYCHOSOCIAL: Patient is calm and cooperative.   SKIN: The skin is warm, dry. Multiple red bumps noted under left axilla, without drainage.    RESPIRATORY: Airway is open and patent. Bilateral chest rise and fall. Respirations are spontaneous, even and unlabored. Normal effort and rate noted. No accessory muscle use noted.   MUSCULOSKELETAL: No obvious deformities noted.

## 2018-06-05 NOTE — ED PROVIDER NOTES
Encounter Date: 6/5/2018    SCRIBE #1 NOTE: I, Tracy Mary, am scribing for, and in the presence of,  Dr. Waite. I have scribed the following portions of the note - the APC attestation.       History     Chief Complaint   Patient presents with    Abscess     under L arm     67 yo male with hx of chronic coagulation, CAD, DVT, diverticulosis, HTN, hyperlipidemia, MI, presents today to ED with complain of multiple lesions under left axillae. Skin lesions for about a week, gradually worsening, multiple erythematous pustule around hair follicles, no drainage, painful to touch. Denies itchiness, fever, arm swelling, insect bites, chills, decreased appetite, abdominal pain, or any other complain          Review of patient's allergies indicates:   Allergen Reactions    Iodine containing multivitamin Swelling     itching    Keflex [cephalexin] Swelling     eyes    Peaches [peach (prunus persica)] Swelling     eyes    Shellfish containing products Swelling    Fig tree Swelling     itching    Tuberculin spenser test ppd Rash     Past Medical History:   Diagnosis Date    Chronic anticoagulation 5/5/2016    Chronic combined systolic and diastolic heart failure 11/26/2012    EF 10-20% on ECHO 2013    Clotting disorder     Coronary artery disease involving native coronary artery of native heart without angina pectoris 11/26/2012    Cath 10- Stents patent non-obstructive disease Cath 11-12015 non-obstructive disease     Diverticulosis of colon     DVT (deep venous thrombosis), unspecified laterality 11/12/2015    Essential hypertension 11/15/2015    Hyperlipidemia     Hypertensive heart disease with heart failure 5/5/2016    MI (myocardial infarction) 2009    x's 5    Nicotine abuse     Obesity 11/26/2012    Pulmonary embolism 2011    Stented coronary artery 11/26/2012    LAD stent placed 10/17/2007      Past Surgical History:   Procedure Laterality Date    APPENDECTOMY      BACK SURGERY       CARDIAC DEFIBRILLATOR PLACEMENT      CARDIAC SURGERY  2007    stent    CARPAL TUNNEL RELEASE Right 04/2018    r knee scope      SPINE SURGERY      TONSILLECTOMY      TRIGGER FINGER RELEASE Right 04/2018    thumb     Family History   Problem Relation Age of Onset    Cancer Mother         colon cancer    Heart disease Mother     Diabetes Mother     Heart disease Father     Stroke Father     Colon polyps Father     Cancer Paternal Grandmother         leukemia    No Known Problems Sister     No Known Problems Daughter     No Known Problems Son     No Known Problems Sister     No Known Problems Son      Social History   Substance Use Topics    Smoking status: Former Smoker     Packs/day: 1.00     Years: 45.00     Types: Cigarettes     Quit date: 12/14/2005    Smokeless tobacco: Never Used      Comment: 1-1.5 ppd every day.    Alcohol use No     Review of Systems   Constitutional: Negative for chills and fever.   HENT: Negative for rhinorrhea.    Respiratory: Negative for cough and shortness of breath.    Cardiovascular: Negative for chest pain and palpitations.   Gastrointestinal: Negative for abdominal pain, diarrhea, nausea and vomiting.   Musculoskeletal: Negative for myalgias.   Skin: Positive for rash (left axillae).   Neurological: Negative for dizziness and headaches.   Psychiatric/Behavioral: Negative for self-injury and suicidal ideas.       Physical Exam     Initial Vitals [06/05/18 1522]   BP Pulse Resp Temp SpO2   (!) 96/54 71 18 98.1 °F (36.7 °C) 96 %      MAP       68         Physical Exam    Nursing note and vitals reviewed.  Constitutional: He appears well-developed and well-nourished. He is not diaphoretic. No distress.   HENT:   Head: Normocephalic and atraumatic.   Eyes: EOM are normal. Pupils are equal, round, and reactive to light.   Neck: Normal range of motion. Neck supple.   Cardiovascular: Normal rate.   Pulmonary/Chest: Breath sounds normal. He has no wheezes. He has no  rhonchi. He has no rales.   Abdominal: Soft. Bowel sounds are normal. There is no tenderness. There is no rebound and no guarding.   Musculoskeletal: Normal range of motion. He exhibits no edema.   Neurological: He is alert and oriented to person, place, and time. He has normal strength.   Skin: Capillary refill takes less than 2 seconds. Rash noted. Rash is pustular (multiple erythematous pustule around hair follicles left axillae, no induration, no fluctuance).   Psychiatric: He has a normal mood and affect.         ED Course   Procedures  Labs Reviewed - No data to display          Medical Decision Making:   History:   Old Medical Records: I decided to obtain old medical records.       APC / Resident Notes:   67 yo male presents with multiple infected hair follicles left axillae x 1 week, gradually worse in the past 2 days. Denies fever, drainage, chills. Physical exam: left axillae with multiple erythematous, cluster pustule around hair follicles. No fluctuance, no induration. Afebrile.  DDX included but not limited to folliculitis, insect bites, abscess  Discussed case with supervising physician. Pt will be discharged home with Rx Bactroban, clindamycin . Pt instructed to follow up with PCP for further evaluation. Detailed return precautions given to patient. Pt verbalized understanding.          Scribe Attestation:   Scribe #1: I performed the above scribed service and the documentation accurately describes the services I performed. I attest to the accuracy of the note.    Attending Attestation:     Physician Attestation Statement for NP/PA:   I discussed this assessment and plan of this patient with the NP/PA, but I did not personally examine the patient. The face to face encounter was performed by the NP/PA.                     Clinical Impression:   The encounter diagnosis was Folliculitis of left axilla.    No orders to display       Disposition:   Disposition: Discharged  Condition:  Stable                        Johanna Romero PA-C  06/05/18 9143

## 2018-06-05 NOTE — ED TRIAGE NOTES
"C/o "bumps" to left arm pit x 2 weeks. Reports pain and swelling to site. Denies fevers, chills, or drainage.   "

## 2018-06-15 ENCOUNTER — HOSPITAL ENCOUNTER (OUTPATIENT)
Facility: HOSPITAL | Age: 68
Discharge: HOME | End: 2018-06-18
Attending: EMERGENCY MEDICINE | Admitting: HOSPITALIST
Payer: MEDICARE

## 2018-06-15 DIAGNOSIS — N17.9 AKI (ACUTE KIDNEY INJURY): Primary | ICD-10-CM

## 2018-06-15 DIAGNOSIS — N17.9 ACUTE RENAL FAILURE SUPERIMPOSED ON STAGE 3 CHRONIC KIDNEY DISEASE: ICD-10-CM

## 2018-06-15 DIAGNOSIS — R06.02 SHORTNESS OF BREATH: ICD-10-CM

## 2018-06-15 DIAGNOSIS — N19 UREMIA: ICD-10-CM

## 2018-06-15 DIAGNOSIS — N18.30 ACUTE RENAL FAILURE SUPERIMPOSED ON STAGE 3 CHRONIC KIDNEY DISEASE: ICD-10-CM

## 2018-06-15 DIAGNOSIS — K92.0 HEMATEMESIS WITH NAUSEA: ICD-10-CM

## 2018-06-15 PROBLEM — R04.2 HEMOPTYSIS: Status: ACTIVE | Noted: 2018-06-15

## 2018-06-15 PROBLEM — R79.89 ELEVATED TROPONIN: Status: ACTIVE | Noted: 2018-06-15

## 2018-06-15 PROBLEM — E87.5 HYPERKALEMIA: Status: ACTIVE | Noted: 2018-06-15

## 2018-06-15 LAB
ABO + RH BLD: NORMAL
ALBUMIN SERPL BCP-MCNC: 4.2 G/DL
ALP SERPL-CCNC: 90 U/L
ALT SERPL W/O P-5'-P-CCNC: 42 U/L
ANION GAP SERPL CALC-SCNC: 13 MMOL/L
AST SERPL-CCNC: 33 U/L
BASOPHILS # BLD AUTO: 0.05 K/UL
BASOPHILS NFR BLD: 0.6 %
BILIRUB SERPL-MCNC: 0.5 MG/DL
BILIRUB UR QL STRIP: NEGATIVE
BLD GP AB SCN CELLS X3 SERPL QL: NORMAL
BNP SERPL-MCNC: 51 PG/ML
BUN SERPL-MCNC: 81 MG/DL
CALCIUM SERPL-MCNC: 9.5 MG/DL
CHLORIDE SERPL-SCNC: 103 MMOL/L
CLARITY UR REFRACT.AUTO: CLEAR
CO2 SERPL-SCNC: 20 MMOL/L
COLOR UR AUTO: YELLOW
CREAT SERPL-MCNC: 2.8 MG/DL
CTP QC/QA: YES
DIFFERENTIAL METHOD: ABNORMAL
EOSINOPHIL # BLD AUTO: 0.2 K/UL
EOSINOPHIL NFR BLD: 1.8 %
ERYTHROCYTE [DISTWIDTH] IN BLOOD BY AUTOMATED COUNT: 13.8 %
EST. GFR  (AFRICAN AMERICAN): 26 ML/MIN/1.73 M^2
EST. GFR  (NON AFRICAN AMERICAN): 22.5 ML/MIN/1.73 M^2
FECAL OCCULT BLOOD, POC: NEGATIVE
GLUCOSE SERPL-MCNC: 120 MG/DL
GLUCOSE UR QL STRIP: NEGATIVE
HCT VFR BLD AUTO: 43.2 %
HGB BLD-MCNC: 14.2 G/DL
HGB UR QL STRIP: NEGATIVE
IMM GRANULOCYTES # BLD AUTO: 0.06 K/UL
IMM GRANULOCYTES NFR BLD AUTO: 0.7 %
INR PPP: 1
KETONES UR QL STRIP: NEGATIVE
LEUKOCYTE ESTERASE UR QL STRIP: NEGATIVE
LIPASE SERPL-CCNC: 44 U/L
LYMPHOCYTES # BLD AUTO: 2.3 K/UL
LYMPHOCYTES NFR BLD: 27.8 %
MCH RBC QN AUTO: 30.9 PG
MCHC RBC AUTO-ENTMCNC: 32.9 G/DL
MCV RBC AUTO: 94 FL
MONOCYTES # BLD AUTO: 0.6 K/UL
MONOCYTES NFR BLD: 7.8 %
NEUTROPHILS # BLD AUTO: 5 K/UL
NEUTROPHILS NFR BLD: 61.3 %
NITRITE UR QL STRIP: NEGATIVE
NRBC BLD-RTO: 0 /100 WBC
PH UR STRIP: 5 [PH] (ref 5–8)
PLATELET # BLD AUTO: 293 K/UL
PMV BLD AUTO: 10.7 FL
POTASSIUM SERPL-SCNC: 5.3 MMOL/L
PROT SERPL-MCNC: 7.4 G/DL
PROT UR QL STRIP: NEGATIVE
PROTHROMBIN TIME: 10.5 SEC
RBC # BLD AUTO: 4.59 M/UL
SODIUM SERPL-SCNC: 136 MMOL/L
SP GR UR STRIP: 1.01 (ref 1–1.03)
TROPONIN I SERPL DL<=0.01 NG/ML-MCNC: 0.03 NG/ML
TROPONIN I SERPL DL<=0.01 NG/ML-MCNC: 0.04 NG/ML
URN SPEC COLLECT METH UR: NORMAL
UROBILINOGEN UR STRIP-ACNC: NEGATIVE EU/DL
WBC # BLD AUTO: 8.12 K/UL

## 2018-06-15 PROCEDURE — 25000003 PHARM REV CODE 250: Mod: NTX | Performed by: NURSE PRACTITIONER

## 2018-06-15 PROCEDURE — 82272 OCCULT BLD FECES 1-3 TESTS: CPT | Mod: NTX

## 2018-06-15 PROCEDURE — 85610 PROTHROMBIN TIME: CPT | Mod: NTX

## 2018-06-15 PROCEDURE — G0378 HOSPITAL OBSERVATION PER HR: HCPCS | Mod: NTX

## 2018-06-15 PROCEDURE — 99285 EMERGENCY DEPT VISIT HI MDM: CPT | Mod: NTX

## 2018-06-15 PROCEDURE — 93005 ELECTROCARDIOGRAM TRACING: CPT | Mod: NTX

## 2018-06-15 PROCEDURE — 85025 COMPLETE CBC W/AUTO DIFF WBC: CPT | Mod: NTX

## 2018-06-15 PROCEDURE — 80053 COMPREHEN METABOLIC PANEL: CPT | Mod: NTX

## 2018-06-15 PROCEDURE — 93010 ELECTROCARDIOGRAM REPORT: CPT | Mod: NTX,,, | Performed by: INTERNAL MEDICINE

## 2018-06-15 PROCEDURE — 63600175 PHARM REV CODE 636 W HCPCS: Mod: NTX | Performed by: HOSPITALIST

## 2018-06-15 PROCEDURE — 83880 ASSAY OF NATRIURETIC PEPTIDE: CPT | Mod: NTX

## 2018-06-15 PROCEDURE — 99285 EMERGENCY DEPT VISIT HI MDM: CPT | Mod: NTX,,, | Performed by: PHYSICIAN ASSISTANT

## 2018-06-15 PROCEDURE — 83690 ASSAY OF LIPASE: CPT | Mod: NTX

## 2018-06-15 PROCEDURE — 81003 URINALYSIS AUTO W/O SCOPE: CPT | Mod: NTX

## 2018-06-15 PROCEDURE — 84484 ASSAY OF TROPONIN QUANT: CPT | Mod: 91,NTX

## 2018-06-15 PROCEDURE — 36415 COLL VENOUS BLD VENIPUNCTURE: CPT | Mod: NTX

## 2018-06-15 PROCEDURE — 25000003 PHARM REV CODE 250: Mod: NTX | Performed by: PHYSICIAN ASSISTANT

## 2018-06-15 PROCEDURE — 25000003 PHARM REV CODE 250: Mod: NTX | Performed by: HOSPITALIST

## 2018-06-15 PROCEDURE — 84484 ASSAY OF TROPONIN QUANT: CPT | Mod: NTX

## 2018-06-15 PROCEDURE — 99220 PR INITIAL OBSERVATION CARE,LEVL III: CPT | Mod: NTX,,, | Performed by: HOSPITALIST

## 2018-06-15 PROCEDURE — 86901 BLOOD TYPING SEROLOGIC RH(D): CPT | Mod: NTX

## 2018-06-15 RX ORDER — HEPARIN SODIUM 5000 [USP'U]/ML
5000 INJECTION, SOLUTION INTRAVENOUS; SUBCUTANEOUS EVERY 8 HOURS
Status: DISCONTINUED | OUTPATIENT
Start: 2018-06-15 | End: 2018-06-18 | Stop reason: HOSPADM

## 2018-06-15 RX ORDER — PANTOPRAZOLE SODIUM 40 MG/1
40 TABLET, DELAYED RELEASE ORAL
Status: COMPLETED | OUTPATIENT
Start: 2018-06-15 | End: 2018-06-15

## 2018-06-15 RX ORDER — CLOPIDOGREL BISULFATE 75 MG/1
75 TABLET ORAL DAILY
Status: DISCONTINUED | OUTPATIENT
Start: 2018-06-16 | End: 2018-06-18 | Stop reason: HOSPADM

## 2018-06-15 RX ORDER — ASPIRIN 325 MG
325 TABLET, DELAYED RELEASE (ENTERIC COATED) ORAL DAILY
Status: DISCONTINUED | OUTPATIENT
Start: 2018-06-16 | End: 2018-06-18 | Stop reason: HOSPADM

## 2018-06-15 RX ORDER — ATORVASTATIN CALCIUM 20 MG/1
80 TABLET, FILM COATED ORAL DAILY
Status: DISCONTINUED | OUTPATIENT
Start: 2018-06-16 | End: 2018-06-18 | Stop reason: HOSPADM

## 2018-06-15 RX ORDER — AMIODARONE HYDROCHLORIDE 100 MG/1
300 TABLET ORAL DAILY
Status: DISCONTINUED | OUTPATIENT
Start: 2018-06-16 | End: 2018-06-15

## 2018-06-15 RX ORDER — PANTOPRAZOLE SODIUM 40 MG/10ML
40 INJECTION, POWDER, LYOPHILIZED, FOR SOLUTION INTRAVENOUS
Status: DISCONTINUED | OUTPATIENT
Start: 2018-06-15 | End: 2018-06-15

## 2018-06-15 RX ORDER — ONDANSETRON 2 MG/ML
4 INJECTION INTRAMUSCULAR; INTRAVENOUS EVERY 6 HOURS PRN
Status: DISCONTINUED | OUTPATIENT
Start: 2018-06-15 | End: 2018-06-18 | Stop reason: HOSPADM

## 2018-06-15 RX ORDER — SODIUM CHLORIDE 9 MG/ML
INJECTION, SOLUTION INTRAVENOUS CONTINUOUS
Status: DISCONTINUED | OUTPATIENT
Start: 2018-06-15 | End: 2018-06-17

## 2018-06-15 RX ORDER — HYDROCODONE BITARTRATE AND ACETAMINOPHEN 10; 325 MG/1; MG/1
1 TABLET ORAL EVERY 8 HOURS PRN
Status: DISCONTINUED | OUTPATIENT
Start: 2018-06-15 | End: 2018-06-18 | Stop reason: HOSPADM

## 2018-06-15 RX ORDER — SODIUM CHLORIDE 0.9 % (FLUSH) 0.9 %
5 SYRINGE (ML) INJECTION
Status: DISCONTINUED | OUTPATIENT
Start: 2018-06-15 | End: 2018-06-18 | Stop reason: HOSPADM

## 2018-06-15 RX ORDER — AMIODARONE HYDROCHLORIDE 100 MG/1
300 TABLET ORAL NIGHTLY
Status: DISCONTINUED | OUTPATIENT
Start: 2018-06-15 | End: 2018-06-18 | Stop reason: HOSPADM

## 2018-06-15 RX ORDER — ONDANSETRON 4 MG/1
4 TABLET, ORALLY DISINTEGRATING ORAL
Status: COMPLETED | OUTPATIENT
Start: 2018-06-15 | End: 2018-06-15

## 2018-06-15 RX ADMIN — PANTOPRAZOLE SODIUM 40 MG: 40 TABLET, DELAYED RELEASE ORAL at 01:06

## 2018-06-15 RX ADMIN — ONDANSETRON 4 MG: 2 INJECTION, SOLUTION INTRAMUSCULAR; INTRAVENOUS at 09:06

## 2018-06-15 RX ADMIN — SODIUM CHLORIDE 250 ML: 0.9 INJECTION, SOLUTION INTRAVENOUS at 09:06

## 2018-06-15 RX ADMIN — HEPARIN SODIUM 5000 UNITS: 5000 INJECTION, SOLUTION INTRAVENOUS; SUBCUTANEOUS at 09:06

## 2018-06-15 RX ADMIN — HYDROCODONE BITARTRATE AND ACETAMINOPHEN 1 TABLET: 10; 325 TABLET ORAL at 09:06

## 2018-06-15 RX ADMIN — DOCUSATE SODIUM 50 MG: 50 CAPSULE, LIQUID FILLED ORAL at 09:06

## 2018-06-15 RX ADMIN — AMIODARONE HYDROCHLORIDE 300 MG: 100 TABLET ORAL at 10:06

## 2018-06-15 RX ADMIN — ONDANSETRON 4 MG: 4 TABLET, ORALLY DISINTEGRATING ORAL at 01:06

## 2018-06-15 RX ADMIN — SODIUM CHLORIDE: 0.9 INJECTION, SOLUTION INTRAVENOUS at 06:06

## 2018-06-15 NOTE — H&P
[facility] Hospital Medicine  History & Physical    Patient Name: Audrey Oneil Jr.  MRN: [id number]  Admission Date: 6/15/2018  Attending Physician: Pamela Turner MD   Primary Care Provider: January Khan MD    [facility] Medicine Team: [facility] MED  Pamela Turner MD     Patient information was obtained from patient, past medical records and ER records.     Subjective:     Principal Problem:Acute renal failure superimposed on stage 3 chronic kidney disease    Chief Complaint:   Chief Complaint   Patient presents with    Hematemesis     vomiting black, on plavix, dizziness, abd swelling        HPI: 68M with combined heart failure (EF 20% 4/18), CAD/MI's with stents, PE 13 yrs ago, HTN, CKD3 who presents after he coughed up a streak of dark blood today. He said it was a very small line of blood but he was concerned because the last time he coughed up blood, he was dx'ed with a PE 13 yrs ago. He states that he has been feeling weak and lightheaded the past four days, associated with decreased oral intake due to nausea. Denies vomiting, fever, chills, leg swelling, or chest pain.     Past Medical History:   Diagnosis Date    Chronic anticoagulation 5/5/2016    Chronic combined systolic and diastolic heart failure 11/26/2012    EF 10-20% on ECHO 2013    Clotting disorder     Coronary artery disease involving native coronary artery of native heart without angina pectoris 11/26/2012    Cath 10- Stents patent non-obstructive disease Cath 11-12015 non-obstructive disease     Diverticulosis of colon     DVT (deep venous thrombosis), unspecified laterality 11/12/2015    Essential hypertension 11/15/2015    Hyperlipidemia     Hypertensive heart disease with heart failure 5/5/2016    MI (myocardial infarction) 2009    x's 5    Nicotine abuse     Obesity 11/26/2012    Pulmonary embolism 2011    Stented coronary artery 11/26/2012    LAD stent placed 10/17/2007        Past Surgical History:    Procedure Laterality Date    APPENDECTOMY      BACK SURGERY      CARDIAC DEFIBRILLATOR PLACEMENT      CARDIAC SURGERY  2007    stent    CARPAL TUNNEL RELEASE Right 04/2018    r knee scope      SPINE SURGERY      TONSILLECTOMY      TRIGGER FINGER RELEASE Right 04/2018    thumb       Review of patient's allergies indicates:   Allergen Reactions    Iodine containing multivitamin Swelling     itching    Keflex [cephalexin] Swelling     eyes    Peaches [peach (prunus persica)] Swelling     eyes    Shellfish containing products Swelling    Fig tree Swelling     itching    Tuberculin spenser test ppd Rash       No current facility-administered medications on file prior to encounter.      Current Outpatient Prescriptions on File Prior to Encounter   Medication Sig    allopurinol (ZYLOPRIM) 100 MG tablet Take 1 tablet (100 mg total) by mouth once daily.    amiodarone (PACERONE) 200 MG Tab TAKE 1 AND 1/2 TABLETS(300 MG) BY MOUTH EVERY DAY    aspirin (ECOTRIN) 325 MG EC tablet Take 325 mg by mouth once daily.    atorvastatin (LIPITOR) 80 MG tablet TAKE 1 TABLET(80 MG) BY MOUTH EVERY DAY    clopidogrel (PLAVIX) 75 mg tablet Take 75 mg by mouth once daily.    docusate sodium (COLACE) 50 MG capsule Take 1 capsule (50 mg total) by mouth 2 (two) times daily.    furosemide (LASIX) 40 MG tablet TAKE 1 TABLET(40 MG) BY MOUTH EVERY DAY    hydrocodone-acetaminophen 10-325mg (NORCO)  mg Tab TK 1 T PO Q 8-10 H PRN P    lisinopril (PRINIVIL,ZESTRIL) 5 MG tablet Take 1 tablet (5 mg total) by mouth once daily.    metoprolol succinate (TOPROL-XL) 50 MG 24 hr tablet TAKE 1 TABLET BY MOUTH EVERY DAY    mupirocin (BACTROBAN) 2 % ointment Apply topically 3 (three) times daily.    neomycin-bacitracin-polymyxin-hydrocortisone (CORTISPORIN) ophthalmic ointment Place into both eyes 2 (two) times daily.    spironolactone (ALDACTONE) 25 MG tablet TAKE 1 TABLET(25 MG) BY MOUTH EVERY DAY     Family History     Problem  Relation (Age of Onset)    Cancer Mother, Paternal Grandmother    Colon polyps Father    Diabetes Mother    Heart disease Mother, Father    No Known Problems Sister, Daughter, Son, Sister, Son    Stroke Father        Social History Main Topics    Smoking status: Former Smoker     Packs/day: 1.00     Years: 45.00     Types: Cigarettes     Quit date: 12/14/2005    Smokeless tobacco: Never Used      Comment: 1-1.5 ppd every day.    Alcohol use No    Drug use: No    Sexual activity: No     Review of Systems   Constitutional: Negative for chills, diaphoresis, fatigue, fever and unexpected weight change.   Respiratory: Positive for cough. Negative for shortness of breath and wheezing.    Cardiovascular: Negative for chest pain, palpitations and leg swelling.   Neurological: Positive for weakness and light-headedness.   All other systems reviewed and are negative.    Objective:     Vital Signs (Most Recent):  Temp: 97.9 °F (36.6 °C) (06/15/18 1748)  Pulse: 61 (06/15/18 1748)  Resp: 18 (06/15/18 1748)  BP: (!) 114/58 (06/15/18 1748)  SpO2: 99 % (06/15/18 1748) Vital Signs (24h Range):  Temp:  [97.6 °F (36.4 °C)-97.9 °F (36.6 °C)] 97.9 °F (36.6 °C)  Pulse:  [60-62] 61  Resp:  [16-18] 18  SpO2:  [96 %-99 %] 99 %  BP: ()/(53-58) 114/58     Weight: 101 kg (222 lb 10.6 oz)  Body mass index is 34.87 kg/m².    Physical Exam   Constitutional: He is oriented to person, place, and time. He appears well-nourished. No distress.   HENT:   Head: Normocephalic and atraumatic.   Nose: Nose normal.   Mouth/Throat: Oropharynx is clear and moist. No oropharyngeal exudate.   Eyes: Conjunctivae and EOM are normal. Pupils are equal, round, and reactive to light. Right eye exhibits no discharge. Left eye exhibits no discharge. No scleral icterus.   Neck: Neck supple. No JVD present. No tracheal deviation present. No thyromegaly present.   Cardiovascular: Normal rate, regular rhythm, normal heart sounds and intact distal pulses.  Exam  reveals no friction rub.    No murmur heard.  Pulmonary/Chest: Effort normal and breath sounds normal. No stridor. No respiratory distress. He has no wheezes. He has no rales. He exhibits no tenderness.   Abdominal: Soft. Bowel sounds are normal. He exhibits no distension and no mass. There is no tenderness. There is no rebound and no guarding.   Musculoskeletal: Normal range of motion. He exhibits no edema, tenderness or deformity.   Lymphadenopathy:     He has no cervical adenopathy.   Neurological: He is alert and oriented to person, place, and time. No cranial nerve deficit. He exhibits normal muscle tone. Coordination normal.   Skin: Skin is warm and dry. No rash noted. He is not diaphoretic. No erythema. No pallor.   Psychiatric: He has a normal mood and affect. His behavior is normal. Judgment and thought content normal.   Vitals reviewed.        CRANIAL NERVES     CN III, IV, VI   Pupils are equal, round, and reactive to light.  Extraocular motions are normal.        Significant Labs: All pertinent labs within the past 24 hours have been reviewed.    Significant Imaging: I have reviewed and interpreted all pertinent imaging results/findings within the past 24 hours.    Assessment/Plan:     * Acute renal failure superimposed on stage 3 chronic kidney disease    Cr 2.8 (1.7-2 baseline).  Sounds pre-renal.   IVFs.           Hemoptysis    One episode, seems very minimal.  H/H normal.  Check VQ scan (no CTA b/c of renal function). DVT us negative.             Elevated troponin    Denies CP. No ECG changes.   Trend trop.          Hyperkalemia    Kayexalate.             VTE Risk Mitigation         Ordered     heparin (porcine) injection 5,000 Units  Every 8 hours      06/15/18 1622     IP VTE HIGH RISK PATIENT  Once      06/15/18 1622     Place GODFREY hose  Until discontinued      06/15/18 1622     Place sequential compression device  Until discontinued      06/15/18 1622     IP VTE HIGH RISK PATIENT  Once       06/15/18 1622             Pamela Turner MD  Department of Hospital Medicine   [facility]

## 2018-06-15 NOTE — ASSESSMENT & PLAN NOTE
One episode, seems very minimal.  H/H normal.  Check VQ scan (no CTA b/c of renal function). DVT us negative.

## 2018-06-15 NOTE — SUBJECTIVE & OBJECTIVE
Past Medical History:   Diagnosis Date    Chronic anticoagulation 5/5/2016    Chronic combined systolic and diastolic heart failure 11/26/2012    EF 10-20% on ECHO 2013    Clotting disorder     Coronary artery disease involving native coronary artery of native heart without angina pectoris 11/26/2012    Cath 10- Stents patent non-obstructive disease Cath 11-12015 non-obstructive disease     Diverticulosis of colon     DVT (deep venous thrombosis), unspecified laterality 11/12/2015    Essential hypertension 11/15/2015    Hyperlipidemia     Hypertensive heart disease with heart failure 5/5/2016    MI (myocardial infarction) 2009    x's 5    Nicotine abuse     Obesity 11/26/2012    Pulmonary embolism 2011    Stented coronary artery 11/26/2012    LAD stent placed 10/17/2007        Past Surgical History:   Procedure Laterality Date    APPENDECTOMY      BACK SURGERY      CARDIAC DEFIBRILLATOR PLACEMENT      CARDIAC SURGERY  2007    stent    CARPAL TUNNEL RELEASE Right 04/2018    r knee scope      SPINE SURGERY      TONSILLECTOMY      TRIGGER FINGER RELEASE Right 04/2018    thumb       Review of patient's allergies indicates:   Allergen Reactions    Iodine containing multivitamin Swelling     itching    Keflex [cephalexin] Swelling     eyes    Peaches [peach (prunus persica)] Swelling     eyes    Shellfish containing products Swelling    Fig tree Swelling     itching    Tuberculin spenser test ppd Rash       No current facility-administered medications on file prior to encounter.      Current Outpatient Prescriptions on File Prior to Encounter   Medication Sig    allopurinol (ZYLOPRIM) 100 MG tablet Take 1 tablet (100 mg total) by mouth once daily.    amiodarone (PACERONE) 200 MG Tab TAKE 1 AND 1/2 TABLETS(300 MG) BY MOUTH EVERY DAY    aspirin (ECOTRIN) 325 MG EC tablet Take 325 mg by mouth once daily.    atorvastatin (LIPITOR) 80 MG tablet TAKE 1 TABLET(80 MG) BY MOUTH EVERY DAY     clopidogrel (PLAVIX) 75 mg tablet Take 75 mg by mouth once daily.    docusate sodium (COLACE) 50 MG capsule Take 1 capsule (50 mg total) by mouth 2 (two) times daily.    furosemide (LASIX) 40 MG tablet TAKE 1 TABLET(40 MG) BY MOUTH EVERY DAY    hydrocodone-acetaminophen 10-325mg (NORCO)  mg Tab TK 1 T PO Q 8-10 H PRN P    lisinopril (PRINIVIL,ZESTRIL) 5 MG tablet Take 1 tablet (5 mg total) by mouth once daily.    metoprolol succinate (TOPROL-XL) 50 MG 24 hr tablet TAKE 1 TABLET BY MOUTH EVERY DAY    mupirocin (BACTROBAN) 2 % ointment Apply topically 3 (three) times daily.    neomycin-bacitracin-polymyxin-hydrocortisone (CORTISPORIN) ophthalmic ointment Place into both eyes 2 (two) times daily.    spironolactone (ALDACTONE) 25 MG tablet TAKE 1 TABLET(25 MG) BY MOUTH EVERY DAY     Family History     Problem Relation (Age of Onset)    Cancer Mother, Paternal Grandmother    Colon polyps Father    Diabetes Mother    Heart disease Mother, Father    No Known Problems Sister, Daughter, Son, Sister, Son    Stroke Father        Social History Main Topics    Smoking status: Former Smoker     Packs/day: 1.00     Years: 45.00     Types: Cigarettes     Quit date: 12/14/2005    Smokeless tobacco: Never Used      Comment: 1-1.5 ppd every day.    Alcohol use No    Drug use: No    Sexual activity: No     Review of Systems   Constitutional: Negative for chills, diaphoresis, fatigue, fever and unexpected weight change.   Respiratory: Positive for cough. Negative for shortness of breath and wheezing.    Cardiovascular: Negative for chest pain, palpitations and leg swelling.   Neurological: Positive for weakness and light-headedness.   All other systems reviewed and are negative.    Objective:     Vital Signs (Most Recent):  Temp: 97.9 °F (36.6 °C) (06/15/18 1748)  Pulse: 61 (06/15/18 1748)  Resp: 18 (06/15/18 1748)  BP: (!) 114/58 (06/15/18 1748)  SpO2: 99 % (06/15/18 1748) Vital Signs (24h Range):  Temp:  [97.6 °F  (36.4 °C)-97.9 °F (36.6 °C)] 97.9 °F (36.6 °C)  Pulse:  [60-62] 61  Resp:  [16-18] 18  SpO2:  [96 %-99 %] 99 %  BP: ()/(53-58) 114/58     Weight: 101 kg (222 lb 10.6 oz)  Body mass index is 34.87 kg/m².    Physical Exam   Constitutional: He is oriented to person, place, and time. He appears well-nourished. No distress.   HENT:   Head: Normocephalic and atraumatic.   Nose: Nose normal.   Mouth/Throat: Oropharynx is clear and moist. No oropharyngeal exudate.   Eyes: Conjunctivae and EOM are normal. Pupils are equal, round, and reactive to light. Right eye exhibits no discharge. Left eye exhibits no discharge. No scleral icterus.   Neck: Neck supple. No JVD present. No tracheal deviation present. No thyromegaly present.   Cardiovascular: Normal rate, regular rhythm, normal heart sounds and intact distal pulses.  Exam reveals no friction rub.    No murmur heard.  Pulmonary/Chest: Effort normal and breath sounds normal. No stridor. No respiratory distress. He has no wheezes. He has no rales. He exhibits no tenderness.   Abdominal: Soft. Bowel sounds are normal. He exhibits no distension and no mass. There is no tenderness. There is no rebound and no guarding.   Musculoskeletal: Normal range of motion. He exhibits no edema, tenderness or deformity.   Lymphadenopathy:     He has no cervical adenopathy.   Neurological: He is alert and oriented to person, place, and time. No cranial nerve deficit. He exhibits normal muscle tone. Coordination normal.   Skin: Skin is warm and dry. No rash noted. He is not diaphoretic. No erythema. No pallor.   Psychiatric: He has a normal mood and affect. His behavior is normal. Judgment and thought content normal.   Vitals reviewed.        CRANIAL NERVES     CN III, IV, VI   Pupils are equal, round, and reactive to light.  Extraocular motions are normal.        Significant Labs: All pertinent labs within the past 24 hours have been reviewed.    Significant Imaging: I have reviewed and  interpreted all pertinent imaging results/findings within the past 24 hours.

## 2018-06-15 NOTE — ED NOTES
Tele monitor [id number] applied and [place] [facility] [place]  [place] shows NSR at a rate of 60 BPM.

## 2018-06-15 NOTE — HPI
68M with combined heart failure (EF 20% 4/18), CAD/MI's with stents, PE 13 yrs ago, HTN, CKD3 who presents after he coughed up a streak of dark blood today. He said it was a very small line of blood but he was concerned because the last time he coughed up blood, he was dx'ed with a PE 13 yrs ago. He states that he has been feeling weak and lightheaded the past four days, associated with decreased oral intake due to nausea. Denies vomiting, fever, chills, leg swelling, or chest pain.

## 2018-06-15 NOTE — ED TRIAGE NOTES
States that he vomited x1 today and it was a black stringy like substance.  States that he feels dizzy and has been disoriented for the past few days.  States that he is having mid abdominal pain and took a Norco just prior to arrival.  Patient's name and date of birth checked and is correct.  LOC: The patient is awake, alert and aware of environment with an appropriate affect, the patient is oriented x 3 and speaking appropriately.  APPEARANCE: Patient resting comfortably and in no acute distress, patient is clean and well groomed, patient's clothing is properly fastened.  CARDIOVASCULAR:  Heart rate regular and even with no peripheral edema noted.  SKIN: The skin is warm and dry, patient has normal skin turgor and moist mucus membranes, skin intact, no breakdown or brusing noted.   MUSKULOSKELETAL: Patient moving all extremities well, no obvious swelling or deformities noted.  RESPIRATORY: Airway is open and patent, respirations are spontaneous, patient has a normal effort and rate.

## 2018-06-15 NOTE — ED PROVIDER NOTES
Encounter Date: 6/15/2018    SCRIBE #1 NOTE: ILucila, am scribing for, and in the presence of, Dr. Tracey. the EKG reading and the APC attestation.       History     Chief Complaint   Patient presents with    Hematemesis     vomiting black, on plavix, dizziness, abd swelling     68-year-old male with a complex past medical history significant for combined systolic and diastolic heart failure (EF 20% on 04/2018 echo), CAD with stents in place on Plavix, history of PE/DVT, HLD, HTN presents to the ED with a chief complaint of hematemesis.  Patient reports nausea with 1 episode of vomiting today.  Patient reports that the vomit had a streak of black it.  He also reports epigastric pain for the past few days.  He reports associated lightheadedness and generalized weakness.  Patient also reports dyspnea on exertion.  He denies diarrhea, dark or bloody stool, fever, chills, chest pain.          Review of patient's allergies indicates:   Allergen Reactions    Iodine containing multivitamin Swelling     itching    Keflex [cephalexin] Swelling     eyes    Peaches [peach (prunus persica)] Swelling     eyes    Shellfish containing products Swelling    Fig tree Swelling     itching    Tuberculin spenser test ppd Rash     Past Medical History:   Diagnosis Date    Chronic anticoagulation 5/5/2016    Chronic combined systolic and diastolic heart failure 11/26/2012    EF 10-20% on ECHO 2013    Clotting disorder     Coronary artery disease involving native coronary artery of native heart without angina pectoris 11/26/2012    Cath 10- Stents patent non-obstructive disease Cath 11-12015 non-obstructive disease     Diverticulosis of colon     DVT (deep venous thrombosis), unspecified laterality 11/12/2015    Essential hypertension 11/15/2015    Hyperlipidemia     Hypertensive heart disease with heart failure 5/5/2016    MI (myocardial infarction) 2009    x's 5    Nicotine abuse     Obesity 11/26/2012     Pulmonary embolism 2011    Stented coronary artery 11/26/2012    LAD stent placed 10/17/2007      Past Surgical History:   Procedure Laterality Date    APPENDECTOMY      BACK SURGERY      CARDIAC DEFIBRILLATOR PLACEMENT      CARDIAC SURGERY  2007    stent    CARPAL TUNNEL RELEASE Right 04/2018    r knee scope      SPINE SURGERY      TONSILLECTOMY      TRIGGER FINGER RELEASE Right 04/2018    thumb     Family History   Problem Relation Age of Onset    Cancer Mother         colon cancer    Heart disease Mother     Diabetes Mother     Heart disease Father     Stroke Father     Colon polyps Father     Cancer Paternal Grandmother         leukemia    No Known Problems Sister     No Known Problems Daughter     No Known Problems Son     No Known Problems Sister     No Known Problems Son      Social History   Substance Use Topics    Smoking status: Former Smoker     Packs/day: 1.00     Years: 45.00     Types: Cigarettes     Quit date: 12/14/2005    Smokeless tobacco: Never Used      Comment: 1-1.5 ppd every day.    Alcohol use No     Review of Systems   Constitutional: Negative for fever.   HENT: Negative for sore throat.    Respiratory: Positive for shortness of breath.    Cardiovascular: Negative for chest pain.   Gastrointestinal: Positive for abdominal pain, nausea and vomiting.        + hematemesis   Genitourinary: Negative for dysuria.   Musculoskeletal: Negative for back pain.   Skin: Negative for rash.   Neurological: Positive for weakness and light-headedness.   Hematological: Does not bruise/bleed easily.       Physical Exam     Initial Vitals [06/15/18 1152]   BP Pulse Resp Temp SpO2   (!) 97/58 62 18 97.6 °F (36.4 °C) 96 %      MAP       --         Physical Exam    Nursing note and vitals reviewed.  Constitutional: He appears well-developed and well-nourished.  Non-toxic appearance. He does not appear ill. No distress.   Chronically ill-appearing   HENT:   Head: Normocephalic and  atraumatic.   Neck: Normal range of motion. Neck supple.   Cardiovascular: Normal rate and regular rhythm. Exam reveals distant heart sounds. Exam reveals no gallop and no friction rub.    No murmur heard.  No peripheral edema   Pulmonary/Chest: Effort normal and breath sounds normal. No accessory muscle usage. No tachypnea. No respiratory distress. He has no decreased breath sounds. He has no wheezes. He has no rhonchi. He has no rales.   Abdominal: Soft. He exhibits distension. There is generalized tenderness.   Mild diffuse tenderness to palpation   Musculoskeletal: Normal range of motion.   Neurological: He is alert.   Skin: Skin is warm and dry. No rash noted. No pallor.   Psychiatric: He has a normal mood and affect. His behavior is normal.         ED Course   Procedures  Labs Reviewed   CBC W/ AUTO DIFFERENTIAL - Abnormal; Notable for the following:        Result Value    RBC 4.59 (*)     Immature Granulocytes 0.7 (*)     Immature Grans (Abs) 0.06 (*)     All other components within normal limits   COMPREHENSIVE METABOLIC PANEL - Abnormal; Notable for the following:     Potassium 5.3 (*)     CO2 20 (*)     Glucose 120 (*)     BUN, Bld 81 (*)     Creatinine 2.8 (*)     eGFR if  26.0 (*)     eGFR if non  22.5 (*)     All other components within normal limits   TROPONIN I - Abnormal; Notable for the following:     Troponin I 0.036 (*)     All other components within normal limits   PROTIME-INR   URINALYSIS, REFLEX TO URINE CULTURE    Narrative:     Preferred Collection Type->Urine, Clean Catch   B-TYPE NATRIURETIC PEPTIDE   LIPASE   TROPONIN I   POCT OCCULT BLOOD STOOL   TYPE & SCREEN     EKG Readings: (Independently Interpreted)   Initial Reading: No STEMI.       US Lower Extremity Veins Bilateral   Final Result      No evidence of deep venous thrombosis in either lower extremity.      Electronically signed by resident: Cornell Humphrey   Date:    06/15/2018   Time:    17:29       Electronically signed by: Daren Harding MD   Date:    06/15/2018   Time:    17:31      X-Ray Chest AP Portable   Final Result      No significant detrimental interval change in the appearance of the chest since 04/19/2018 is appreciated.         Electronically signed by: Kevin Crespo MD   Date:    06/15/2018   Time:    13:27      NM Lung Ventilation Perfusion Imaging    (Results Pending)        Medical Decision Making:   History:   Old Medical Records: I decided to obtain old medical records.  Differential Diagnosis:   My differential diagnosis includes but is not limited to:  GI bleed, PUD, anemia, hemorrhagic shock  Independently Interpreted Test(s):   I have ordered and independently interpreted EKG Reading(s) - see prior notes  Clinical Tests:   Lab Tests: Ordered and Reviewed  Medical Tests: Ordered and Reviewed       APC / Resident Notes:   68-year-old male of with a history of heart failure with EF of 20%, history of PE presents with 1 episode of possible hematemesis.  He is hypotensive on initial evaluation at 97/58, but states that his BP is typically low.  Per chart review, this BP is not significantly lower than patient's baseline.    Workup was remarkable for a mild LAVERNE with creatinine at 2.8 from a baseline of 1.8, a BUN at 81, which is double patient's baseline.  This is likely indicative of GI bleed.  K slightly elevated at 5.3.  H&H is stable.  Given patient's uremia and concern for GI bleed, we will place patient in observation for serial H/H and possible EGD in the morning to further evaluate.  Patient agreeable to this plan. I have reviewed the patient's records and discussed this case with my supervising physician.         Scribe Attestation:   Scribe #1: I performed the above scribed service and the documentation accurately describes the services I performed. I attest to the accuracy of the note.    Attending Attestation:     Physician Attestation Statement for NP/PA:   I discussed this  assessment and plan of this patient with the NP/PA, but I did not personally examine the patient. The face to face encounter was performed by the NP/PA.                     Clinical Impression:   The primary encounter diagnosis was LAVERNE (acute kidney injury). Diagnoses of Shortness of breath, Uremia, and Hematemesis with nausea were also pertinent to this visit.      Disposition:   Disposition: Placed in Observation  Condition: Robinson Fonseca PA-C  06/15/18 9667

## 2018-06-15 NOTE — NURSING
Received report from Lakisha TALAMANTES in ER. Pt arrived to OBS unit via w/c. Pt accompanied by transport associate. Pt aaox4. Pt denies pain or dizziness at this time. Pt oriented to room. Bed at lowest level, wheels locked. Call light within pt reach.

## 2018-06-16 DIAGNOSIS — I50.42 CHRONIC COMBINED SYSTOLIC AND DIASTOLIC HEART FAILURE: Chronic | ICD-10-CM

## 2018-06-16 DIAGNOSIS — I47.20 VT (VENTRICULAR TACHYCARDIA): ICD-10-CM

## 2018-06-16 LAB
ALBUMIN SERPL BCP-MCNC: 3.6 G/DL
ALP SERPL-CCNC: 83 U/L
ALT SERPL W/O P-5'-P-CCNC: 41 U/L
ANION GAP SERPL CALC-SCNC: 8 MMOL/L
AST SERPL-CCNC: 27 U/L
BASOPHILS # BLD AUTO: 0.04 K/UL
BASOPHILS NFR BLD: 0.7 %
BILIRUB SERPL-MCNC: 0.4 MG/DL
BUN SERPL-MCNC: 75 MG/DL
CALCIUM SERPL-MCNC: 8.8 MG/DL
CHLORIDE SERPL-SCNC: 106 MMOL/L
CO2 SERPL-SCNC: 22 MMOL/L
CREAT SERPL-MCNC: 2.3 MG/DL
D DIMER PPP IA.FEU-MCNC: 0.83 MG/L FEU
DIFFERENTIAL METHOD: ABNORMAL
EOSINOPHIL # BLD AUTO: 0.1 K/UL
EOSINOPHIL NFR BLD: 2.2 %
ERYTHROCYTE [DISTWIDTH] IN BLOOD BY AUTOMATED COUNT: 13.5 %
EST. GFR  (AFRICAN AMERICAN): 33 ML/MIN/1.73 M^2
EST. GFR  (NON AFRICAN AMERICAN): 28.5 ML/MIN/1.73 M^2
GLUCOSE SERPL-MCNC: 88 MG/DL
HCT VFR BLD AUTO: 38 %
HGB BLD-MCNC: 12.6 G/DL
IMM GRANULOCYTES # BLD AUTO: 0.05 K/UL
IMM GRANULOCYTES NFR BLD AUTO: 0.8 %
LYMPHOCYTES # BLD AUTO: 2 K/UL
LYMPHOCYTES NFR BLD: 33.2 %
MCH RBC QN AUTO: 31.3 PG
MCHC RBC AUTO-ENTMCNC: 33.2 G/DL
MCV RBC AUTO: 94 FL
MONOCYTES # BLD AUTO: 0.6 K/UL
MONOCYTES NFR BLD: 10.1 %
NEUTROPHILS # BLD AUTO: 3.1 K/UL
NEUTROPHILS NFR BLD: 53 %
NRBC BLD-RTO: 0 /100 WBC
PLATELET # BLD AUTO: 218 K/UL
PMV BLD AUTO: 10.6 FL
POTASSIUM SERPL-SCNC: 4.7 MMOL/L
PROT SERPL-MCNC: 6.1 G/DL
RBC # BLD AUTO: 4.03 M/UL
SODIUM SERPL-SCNC: 136 MMOL/L
WBC # BLD AUTO: 5.93 K/UL

## 2018-06-16 PROCEDURE — 85025 COMPLETE CBC W/AUTO DIFF WBC: CPT | Mod: NTX

## 2018-06-16 PROCEDURE — 25000003 PHARM REV CODE 250: Mod: NTX | Performed by: NURSE PRACTITIONER

## 2018-06-16 PROCEDURE — G0378 HOSPITAL OBSERVATION PER HR: HCPCS | Mod: NTX

## 2018-06-16 PROCEDURE — 36415 COLL VENOUS BLD VENIPUNCTURE: CPT | Mod: NTX

## 2018-06-16 PROCEDURE — 99226 PR SUBSEQUENT OBSERVATION CARE,LEVEL III: CPT | Mod: NTX,,, | Performed by: HOSPITALIST

## 2018-06-16 PROCEDURE — 85379 FIBRIN DEGRADATION QUANT: CPT | Mod: NTX

## 2018-06-16 PROCEDURE — 63600175 PHARM REV CODE 636 W HCPCS: Mod: NTX | Performed by: HOSPITALIST

## 2018-06-16 PROCEDURE — 25000003 PHARM REV CODE 250: Mod: NTX | Performed by: HOSPITALIST

## 2018-06-16 PROCEDURE — 80053 COMPREHEN METABOLIC PANEL: CPT | Mod: NTX

## 2018-06-16 RX ORDER — SPIRONOLACTONE 25 MG/1
TABLET ORAL
Qty: 90 TABLET | Refills: 0 | Status: SHIPPED | OUTPATIENT
Start: 2018-06-16 | End: 2018-06-17 | Stop reason: HOSPADM

## 2018-06-16 RX ADMIN — HEPARIN SODIUM 5000 UNITS: 5000 INJECTION, SOLUTION INTRAVENOUS; SUBCUTANEOUS at 03:06

## 2018-06-16 RX ADMIN — SODIUM CHLORIDE: 0.9 INJECTION, SOLUTION INTRAVENOUS at 05:06

## 2018-06-16 RX ADMIN — CLOPIDOGREL 75 MG: 75 TABLET, FILM COATED ORAL at 08:06

## 2018-06-16 RX ADMIN — HYDROCODONE BITARTRATE AND ACETAMINOPHEN 1 TABLET: 10; 325 TABLET ORAL at 10:06

## 2018-06-16 RX ADMIN — HEPARIN SODIUM 5000 UNITS: 5000 INJECTION, SOLUTION INTRAVENOUS; SUBCUTANEOUS at 09:06

## 2018-06-16 RX ADMIN — HEPARIN SODIUM 5000 UNITS: 5000 INJECTION, SOLUTION INTRAVENOUS; SUBCUTANEOUS at 05:06

## 2018-06-16 RX ADMIN — ASPIRIN 325 MG: 325 TABLET, DELAYED RELEASE ORAL at 08:06

## 2018-06-16 RX ADMIN — AMIODARONE HYDROCHLORIDE 300 MG: 100 TABLET ORAL at 09:06

## 2018-06-16 RX ADMIN — DOCUSATE SODIUM 50 MG: 50 CAPSULE, LIQUID FILLED ORAL at 09:06

## 2018-06-16 RX ADMIN — DOCUSATE SODIUM 50 MG: 50 CAPSULE, LIQUID FILLED ORAL at 08:06

## 2018-06-16 RX ADMIN — ATORVASTATIN CALCIUM 80 MG: 20 TABLET, FILM COATED ORAL at 08:06

## 2018-06-16 RX ADMIN — SODIUM CHLORIDE: 0.9 INJECTION, SOLUTION INTRAVENOUS at 07:06

## 2018-06-16 NOTE — SUBJECTIVE & OBJECTIVE
Interval History  Still feeling lightheaded. Is ambulate to okay in the roberts. Denies SOB, CP. No further episodes of hemoptysis.     Review of Systems   Constitutional: Negative for chills, diaphoresis, fatigue, fever and unexpected weight change.   Respiratory: Positive for cough. Negative for shortness of breath and wheezing.    Cardiovascular: Negative for chest pain, palpitations and leg swelling.   Neurological: Positive for weakness and light-headedness.   All other systems reviewed and are negative.    Objective:     Vital Signs (Most Recent):  Temp: 98.2 °F (36.8 °C) (06/16/18 1234)  Pulse: 61 (06/16/18 1234)  Resp: 16 (06/16/18 1234)  BP: (!) 118/58 (06/16/18 1234)  SpO2: 97 % (06/16/18 1234) Vital Signs (24h Range):  Temp:  [97.7 °F (36.5 °C)-98.2 °F (36.8 °C)] 98.2 °F (36.8 °C)  Pulse:  [59-63] 61  Resp:  [16-18] 16  SpO2:  [93 %-99 %] 97 %  BP: ()/(50-58) 118/58     Weight: 101 kg (222 lb 10.6 oz)  Body mass index is 34.87 kg/m².    Physical Exam   Constitutional: He is oriented to person, place, and time. He appears well-nourished. No distress.   HENT:   Head: Normocephalic and atraumatic.   Nose: Nose normal.   Mouth/Throat: Oropharynx is clear and moist. No oropharyngeal exudate.   Eyes: Conjunctivae and EOM are normal. Pupils are equal, round, and reactive to light. Right eye exhibits no discharge. Left eye exhibits no discharge. No scleral icterus.   Neck: Neck supple. No JVD present. No tracheal deviation present. No thyromegaly present.   Cardiovascular: Normal rate, regular rhythm, normal heart sounds and intact distal pulses.  Exam reveals no friction rub.    No murmur heard.  Pulmonary/Chest: Effort normal and breath sounds normal. No stridor. No respiratory distress. He has no wheezes. He has no rales. He exhibits no tenderness.   Abdominal: Soft. Bowel sounds are normal. He exhibits no distension and no mass. There is no tenderness. There is no rebound and no guarding.    Musculoskeletal: Normal range of motion. He exhibits no edema, tenderness or deformity.   Lymphadenopathy:     He has no cervical adenopathy.   Neurological: He is alert and oriented to person, place, and time. No cranial nerve deficit. He exhibits normal muscle tone. Coordination normal.   Skin: Skin is warm and dry. No rash noted. He is not diaphoretic. No erythema. No pallor.   Psychiatric: He has a normal mood and affect. His behavior is normal. Judgment and thought content normal.   Vitals reviewed.        CRANIAL NERVES     CN III, IV, VI   Pupils are equal, round, and reactive to light.  Extraocular motions are normal.        Significant Labs: All pertinent labs within the past 24 hours have been reviewed.    Significant Imaging: I have reviewed and interpreted all pertinent imaging results/findings within the past 24 hours.

## 2018-06-16 NOTE — PLAN OF CARE
Problem: Patient Care Overview  Goal: Plan of Care Review  Outcome: Ongoing (interventions implemented as appropriate)  Patient has remained free of falls this shift. He is AAOx4 and VS's have been stable. His BP is not going to be high because of his low ejection fraction. He has walked across the unit twice to go to the bathroom. His walking is even and straight. He has been concerned that his IV is leaking, but it has been checked and it is not leaking.  He has not complained of pain or any other discomfort.

## 2018-06-16 NOTE — PLAN OF CARE
Problem: Patient Care Overview  Goal: Plan of Care Review  Outcome: Ongoing (interventions implemented as appropriate)  Pt with no falls or injuries this shift. Pt denies pain this shift. Cardiac monitoring. Paced rhythmn 60s.

## 2018-06-16 NOTE — PLAN OF CARE
Problem: Patient Care Overview  Goal: Plan of Care Review  Outcome: Ongoing (interventions implemented as appropriate)  Pt with no falls or injuries this shift. Pt denies pain. Cardiac monitoring. Intermittently paced. 60s

## 2018-06-16 NOTE — PROGRESS NOTES
[facility] Hospital Medicine  Progress Note    Patient Name: Audrey Oneil Jr.  MRN: [id number]  Patient Class: OP- Observation   Admission Date: 6/15/2018  Length of Stay: 0 days  Attending Physician: Pamela Turner MD  Primary Care Provider: January Khan MD    [facility] Medicine Team: [facility] HOSP MED  Pamela Turner MD    Subjective:     Principal Problem:Acute renal failure superimposed on stage 3 chronic kidney disease    HPI:  68M with combined heart failure (EF 20% 4/18), CAD/MI's with stents, PE 13 yrs ago, HTN, CKD3 who presents after he coughed up a streak of dark blood today. He said it was a very small line of blood but he was concerned because the last time he coughed up blood, he was dx'ed with a PE 13 yrs ago. He states that he has been feeling weak and lightheaded the past four days, associated with decreased oral intake due to nausea. Denies vomiting, fever, chills, leg swelling, or chest pain.     Hospital Course:  No notes on file    Interval History  Still feeling lightheaded. Is ambulate to okay in the roberts. Denies SOB, CP. No further episodes of hemoptysis.     Review of Systems   Constitutional: Negative for chills, diaphoresis, fatigue, fever and unexpected weight change.   Respiratory: Positive for cough. Negative for shortness of breath and wheezing.    Cardiovascular: Negative for chest pain, palpitations and leg swelling.   Neurological: Positive for weakness and light-headedness.   All other systems reviewed and are negative.    Objective:     Vital Signs (Most Recent):  Temp: 98.2 °F (36.8 °C) (06/16/18 1234)  Pulse: 61 (06/16/18 1234)  Resp: 16 (06/16/18 1234)  BP: (!) 118/58 (06/16/18 1234)  SpO2: 97 % (06/16/18 1234) Vital Signs (24h Range):  Temp:  [97.7 °F (36.5 °C)-98.2 °F (36.8 °C)] 98.2 °F (36.8 °C)  Pulse:  [59-63] 61  Resp:  [16-18] 16  SpO2:  [93 %-99 %] 97 %  BP: ()/(50-58) 118/58     Weight: 101 kg (222 lb 10.6 oz)  Body mass index is 34.87  kg/m².    Physical Exam   Constitutional: He is oriented to person, place, and time. He appears well-nourished. No distress.   HENT:   Head: Normocephalic and atraumatic.   Nose: Nose normal.   Mouth/Throat: Oropharynx is clear and moist. No oropharyngeal exudate.   Eyes: Conjunctivae and EOM are normal. Pupils are equal, round, and reactive to light. Right eye exhibits no discharge. Left eye exhibits no discharge. No scleral icterus.   Neck: Neck supple. No JVD present. No tracheal deviation present. No thyromegaly present.   Cardiovascular: Normal rate, regular rhythm, normal heart sounds and intact distal pulses.  Exam reveals no friction rub.    No murmur heard.  Pulmonary/Chest: Effort normal and breath sounds normal. No stridor. No respiratory distress. He has no wheezes. He has no rales. He exhibits no tenderness.   Abdominal: Soft. Bowel sounds are normal. He exhibits no distension and no mass. There is no tenderness. There is no rebound and no guarding.   Musculoskeletal: Normal range of motion. He exhibits no edema, tenderness or deformity.   Lymphadenopathy:     He has no cervical adenopathy.   Neurological: He is alert and oriented to person, place, and time. No cranial nerve deficit. He exhibits normal muscle tone. Coordination normal.   Skin: Skin is warm and dry. No rash noted. He is not diaphoretic. No erythema. No pallor.   Psychiatric: He has a normal mood and affect. His behavior is normal. Judgment and thought content normal.   Vitals reviewed.        CRANIAL NERVES     CN III, IV, VI   Pupils are equal, round, and reactive to light.  Extraocular motions are normal.        Significant Labs: All pertinent labs within the past 24 hours have been reviewed.    Significant Imaging: I have reviewed and interpreted all pertinent imaging results/findings within the past 24 hours.    Assessment/Plan:      * Acute renal failure superimposed on stage 3 chronic kidney disease    Cr 2.8 on admission (1.7-2  baseline).  Improving on IVFs.   Hold diuretics.           Hemoptysis    One episode, seems very minimal.  H/H normal.  DVT us negative.  Check CTA in AM if renal function improved.              Elevated troponin    Denies CP. No ECG changes.   Trops flat.           Hyperkalemia    Kayexalate. Resolved.              VTE Risk Mitigation         Ordered     heparin (porcine) injection 5,000 Units  Every 8 hours      06/15/18 1622     IP VTE HIGH RISK PATIENT  Once      06/15/18 1622     Place GODFREY hose  Until discontinued      06/15/18 1622     Place sequential compression device  Until discontinued      06/15/18 1622     IP VTE HIGH RISK PATIENT  Once      06/15/18 1622              Pamela Turner MD  Department of Hospital Medicine   [facility]

## 2018-06-16 NOTE — ASSESSMENT & PLAN NOTE
One episode, seems very minimal.  H/H normal.  DVT us negative.  Check CTA in AM if renal function improved.

## 2018-06-17 LAB
ALBUMIN SERPL BCP-MCNC: 3.8 G/DL
ALP SERPL-CCNC: 99 U/L
ALT SERPL W/O P-5'-P-CCNC: 44 U/L
ANION GAP SERPL CALC-SCNC: 7 MMOL/L
AST SERPL-CCNC: 28 U/L
BASOPHILS # BLD AUTO: 0.02 K/UL
BASOPHILS NFR BLD: 0.4 %
BILIRUB SERPL-MCNC: 0.5 MG/DL
BUN SERPL-MCNC: 44 MG/DL
CALCIUM SERPL-MCNC: 9.1 MG/DL
CHLORIDE SERPL-SCNC: 107 MMOL/L
CO2 SERPL-SCNC: 24 MMOL/L
CREAT SERPL-MCNC: 1.4 MG/DL
DIFFERENTIAL METHOD: ABNORMAL
EOSINOPHIL # BLD AUTO: 0.1 K/UL
EOSINOPHIL NFR BLD: 2.6 %
ERYTHROCYTE [DISTWIDTH] IN BLOOD BY AUTOMATED COUNT: 13.5 %
EST. GFR  (AFRICAN AMERICAN): >60 ML/MIN/1.73 M^2
EST. GFR  (NON AFRICAN AMERICAN): 52 ML/MIN/1.73 M^2
GLUCOSE SERPL-MCNC: 86 MG/DL
HCT VFR BLD AUTO: 36.6 %
HGB BLD-MCNC: 12.1 G/DL
IMM GRANULOCYTES # BLD AUTO: 0.02 K/UL
IMM GRANULOCYTES NFR BLD AUTO: 0.4 %
LYMPHOCYTES # BLD AUTO: 1.4 K/UL
LYMPHOCYTES NFR BLD: 28.3 %
MCH RBC QN AUTO: 31.2 PG
MCHC RBC AUTO-ENTMCNC: 33.1 G/DL
MCV RBC AUTO: 94 FL
MONOCYTES # BLD AUTO: 0.5 K/UL
MONOCYTES NFR BLD: 8.9 %
NEUTROPHILS # BLD AUTO: 3 K/UL
NEUTROPHILS NFR BLD: 59.4 %
NRBC BLD-RTO: 0 /100 WBC
PLATELET # BLD AUTO: 183 K/UL
PMV BLD AUTO: 10.3 FL
POTASSIUM SERPL-SCNC: 5 MMOL/L
PROT SERPL-MCNC: 6.7 G/DL
RBC # BLD AUTO: 3.88 M/UL
SODIUM SERPL-SCNC: 138 MMOL/L
WBC # BLD AUTO: 5.08 K/UL

## 2018-06-17 PROCEDURE — 36415 COLL VENOUS BLD VENIPUNCTURE: CPT | Mod: NTX

## 2018-06-17 PROCEDURE — G0378 HOSPITAL OBSERVATION PER HR: HCPCS | Mod: NTX

## 2018-06-17 PROCEDURE — 25000003 PHARM REV CODE 250: Mod: NTX | Performed by: HOSPITALIST

## 2018-06-17 PROCEDURE — 80053 COMPREHEN METABOLIC PANEL: CPT | Mod: NTX

## 2018-06-17 PROCEDURE — 82962 GLUCOSE BLOOD TEST: CPT | Mod: NTX

## 2018-06-17 PROCEDURE — 25500020 PHARM REV CODE 255: Mod: NTX | Performed by: HOSPITALIST

## 2018-06-17 PROCEDURE — 25000003 PHARM REV CODE 250: Mod: NTX | Performed by: NURSE PRACTITIONER

## 2018-06-17 PROCEDURE — 63600175 PHARM REV CODE 636 W HCPCS: Mod: NTX | Performed by: HOSPITALIST

## 2018-06-17 PROCEDURE — 85025 COMPLETE CBC W/AUTO DIFF WBC: CPT | Mod: NTX

## 2018-06-17 RX ORDER — DIPHENHYDRAMINE HYDROCHLORIDE 50 MG/ML
50 INJECTION INTRAMUSCULAR; INTRAVENOUS ONCE
Status: COMPLETED | OUTPATIENT
Start: 2018-06-17 | End: 2018-06-17

## 2018-06-17 RX ORDER — DIPHENHYDRAMINE HYDROCHLORIDE 50 MG/ML
50 INJECTION INTRAMUSCULAR; INTRAVENOUS ONCE
Status: DISCONTINUED | OUTPATIENT
Start: 2018-06-17 | End: 2018-06-17

## 2018-06-17 RX ORDER — DIPHENHYDRAMINE HCL 50 MG
50 CAPSULE ORAL ONCE
Status: DISCONTINUED | OUTPATIENT
Start: 2018-06-17 | End: 2018-06-17

## 2018-06-17 RX ORDER — FAMOTIDINE 10 MG/ML
20 INJECTION INTRAVENOUS ONCE
Status: DISCONTINUED | OUTPATIENT
Start: 2018-06-17 | End: 2018-06-17

## 2018-06-17 RX ADMIN — DOCUSATE SODIUM 50 MG: 50 CAPSULE, LIQUID FILLED ORAL at 09:06

## 2018-06-17 RX ADMIN — SODIUM CHLORIDE: 9 INJECTION, SOLUTION INTRAVENOUS at 05:06

## 2018-06-17 RX ADMIN — IOHEXOL 100 ML: 350 INJECTION, SOLUTION INTRAVENOUS at 11:06

## 2018-06-17 RX ADMIN — HYDROCODONE BITARTRATE AND ACETAMINOPHEN 1 TABLET: 10; 325 TABLET ORAL at 12:06

## 2018-06-17 RX ADMIN — DIPHENHYDRAMINE HYDROCHLORIDE 50 MG: 50 INJECTION INTRAMUSCULAR; INTRAVENOUS at 09:06

## 2018-06-17 RX ADMIN — ASPIRIN 325 MG: 325 TABLET, DELAYED RELEASE ORAL at 09:06

## 2018-06-17 RX ADMIN — HEPARIN SODIUM 5000 UNITS: 5000 INJECTION, SOLUTION INTRAVENOUS; SUBCUTANEOUS at 10:06

## 2018-06-17 RX ADMIN — AMIODARONE HYDROCHLORIDE 300 MG: 100 TABLET ORAL at 10:06

## 2018-06-17 RX ADMIN — CLOPIDOGREL 75 MG: 75 TABLET, FILM COATED ORAL at 09:06

## 2018-06-17 RX ADMIN — HEPARIN SODIUM 5000 UNITS: 5000 INJECTION, SOLUTION INTRAVENOUS; SUBCUTANEOUS at 05:06

## 2018-06-17 RX ADMIN — ATORVASTATIN CALCIUM 80 MG: 20 TABLET, FILM COATED ORAL at 09:06

## 2018-06-17 NOTE — HOSPITAL COURSE
Acute renal failure superimposed on stage 3 chronic kidney disease     Pre-renal  Cr 2.8 on admission (1.3-1.5 baseline).  Resolved on IVFs.   Resume home Lasix, but will d/c spironolactone for now until evaluated by PCP/Cards  Pt to follow-up with PCP and Cardiologist next week.              Hemoptysis     One episode, seems very minimal.  H/H normal.  DVT us negative.  CTA negative for PE                Elevated troponin     Denies CP. No ECG changes.   Trops flat.             Hyperkalemia  -Hold lisinopril until seen by outpt MD  -I don't think this was the cause of his K. Can likely resume at a later date.

## 2018-06-17 NOTE — PLAN OF CARE
Problem: Patient Care Overview  Goal: Plan of Care Review  Outcome: Ongoing (interventions implemented as appropriate)  Patient has remained free of falls this shift. He is AAOx4 and VS's have remained stable. He has been up to the bathroom at least 3 times so far this evening. He asked for a pain pill around 2300 and it was administered.  Otherwise he has not had any problems this shift. He will probably go home in the AM.

## 2018-06-18 ENCOUNTER — TELEPHONE (OUTPATIENT)
Dept: INTERNAL MEDICINE | Facility: CLINIC | Age: 68
End: 2018-06-18

## 2018-06-18 VITALS
HEIGHT: 67 IN | DIASTOLIC BLOOD PRESSURE: 64 MMHG | TEMPERATURE: 99 F | SYSTOLIC BLOOD PRESSURE: 125 MMHG | RESPIRATION RATE: 18 BRPM | HEART RATE: 67 BPM | BODY MASS INDEX: 34.89 KG/M2 | OXYGEN SATURATION: 99 % | WEIGHT: 222.31 LBS

## 2018-06-18 LAB
ALBUMIN SERPL BCP-MCNC: 3.4 G/DL
ALP SERPL-CCNC: 88 U/L
ALT SERPL W/O P-5'-P-CCNC: 35 U/L
ANION GAP SERPL CALC-SCNC: 6 MMOL/L
AST SERPL-CCNC: 21 U/L
BASOPHILS # BLD AUTO: 0.03 K/UL
BASOPHILS NFR BLD: 0.5 %
BILIRUB SERPL-MCNC: 0.3 MG/DL
BUN SERPL-MCNC: 30 MG/DL
CALCIUM SERPL-MCNC: 8.7 MG/DL
CHLORIDE SERPL-SCNC: 110 MMOL/L
CO2 SERPL-SCNC: 21 MMOL/L
CREAT SERPL-MCNC: 1.6 MG/DL
DIFFERENTIAL METHOD: ABNORMAL
EOSINOPHIL # BLD AUTO: 0 K/UL
EOSINOPHIL NFR BLD: 0.5 %
ERYTHROCYTE [DISTWIDTH] IN BLOOD BY AUTOMATED COUNT: 13.5 %
EST. GFR  (AFRICAN AMERICAN): 51.1 ML/MIN/1.73 M^2
EST. GFR  (NON AFRICAN AMERICAN): 44.2 ML/MIN/1.73 M^2
GLUCOSE SERPL-MCNC: 104 MG/DL
HCT VFR BLD AUTO: 38.6 %
HGB BLD-MCNC: 12.6 G/DL
IMM GRANULOCYTES # BLD AUTO: 0.04 K/UL
IMM GRANULOCYTES NFR BLD AUTO: 0.6 %
LYMPHOCYTES # BLD AUTO: 1.6 K/UL
LYMPHOCYTES NFR BLD: 25.8 %
MCH RBC QN AUTO: 31.3 PG
MCHC RBC AUTO-ENTMCNC: 32.6 G/DL
MCV RBC AUTO: 96 FL
MONOCYTES # BLD AUTO: 0.6 K/UL
MONOCYTES NFR BLD: 9.5 %
NEUTROPHILS # BLD AUTO: 3.9 K/UL
NEUTROPHILS NFR BLD: 63.1 %
NRBC BLD-RTO: 0 /100 WBC
PLATELET # BLD AUTO: 230 K/UL
PMV BLD AUTO: 10.2 FL
POTASSIUM SERPL-SCNC: 5.4 MMOL/L
PROT SERPL-MCNC: 6 G/DL
RBC # BLD AUTO: 4.03 M/UL
SODIUM SERPL-SCNC: 137 MMOL/L
WBC # BLD AUTO: 6.2 K/UL

## 2018-06-18 PROCEDURE — 25000003 PHARM REV CODE 250: Mod: NTX | Performed by: HOSPITALIST

## 2018-06-18 PROCEDURE — 63600175 PHARM REV CODE 636 W HCPCS: Mod: NTX | Performed by: HOSPITALIST

## 2018-06-18 PROCEDURE — 99217 PR OBSERVATION CARE DISCHARGE: CPT | Mod: NTX,,, | Performed by: HOSPITALIST

## 2018-06-18 PROCEDURE — 85025 COMPLETE CBC W/AUTO DIFF WBC: CPT | Mod: NTX

## 2018-06-18 PROCEDURE — 36415 COLL VENOUS BLD VENIPUNCTURE: CPT | Mod: NTX

## 2018-06-18 PROCEDURE — G0378 HOSPITAL OBSERVATION PER HR: HCPCS | Mod: NTX

## 2018-06-18 PROCEDURE — 80053 COMPREHEN METABOLIC PANEL: CPT | Mod: NTX

## 2018-06-18 RX ORDER — AMIODARONE HYDROCHLORIDE 200 MG/1
TABLET ORAL
Qty: 135 TABLET | Refills: 0 | Status: SHIPPED | OUTPATIENT
Start: 2018-06-18 | End: 2018-08-16

## 2018-06-18 RX ADMIN — DOCUSATE SODIUM 50 MG: 50 CAPSULE, LIQUID FILLED ORAL at 09:06

## 2018-06-18 RX ADMIN — ATORVASTATIN CALCIUM 80 MG: 20 TABLET, FILM COATED ORAL at 09:06

## 2018-06-18 RX ADMIN — ASPIRIN 325 MG: 325 TABLET, DELAYED RELEASE ORAL at 09:06

## 2018-06-18 RX ADMIN — CLOPIDOGREL 75 MG: 75 TABLET, FILM COATED ORAL at 09:06

## 2018-06-18 RX ADMIN — HEPARIN SODIUM 5000 UNITS: 5000 INJECTION, SOLUTION INTRAVENOUS; SUBCUTANEOUS at 05:06

## 2018-06-18 NOTE — TELEPHONE ENCOUNTER
hos f/u appt made, will have to schedule with a different provider is new appt is needed. PCP is limited for appts within 1-2 weeks for hos f/u.

## 2018-06-18 NOTE — TELEPHONE ENCOUNTER
----- Message from Yasmin Ramos sent at 6/18/2018 11:04 AM CDT -----  Contact: Patient [phone]  The  is requesting a sooner hospital follow up appointment for the patient in one to two weeks and she didn't want to schedule with a colleague.    Thank you

## 2018-06-18 NOTE — PLAN OF CARE
Problem: Patient Care Overview  Goal: Plan of Care Review  Outcome: Ongoing (interventions implemented as appropriate)  POC reviewed with pt. Safety precautions maintained. Bed in low position wheels locked.side rails up x 2. Call light within reach. Pt free of falls.

## 2018-06-18 NOTE — PLAN OF CARE
Plan is to discharge home with no needs.    Future Appointments  Date Time Provider Department Center   6/25/2018 1:00 PM January Khan MD [facility] IM [facility] PC[facility]   6/26/2018 2:30 PM LAB, APPOINTMENT [place] [facility] LAB V[facility]   6/26/2018 4:00 PM Breezy Gongora MD [facility] HE[facility]TT[facility] [place] [facility]   7/24/2018 8:00 AM HOME MONITOR DEVICE CHECK, [facility] AR[facility]HYTH [facility]        06/18/18 8845   Final Note   Assessment Type Discharge Planning Assessment   Discharge Disposition Home   Hospital Follow Up  Appt(s) scheduled? Yes   Discharge plans and expectations educations in teach back method with documentation complete? Yes   Right Care Referral Info   Post Acute Recommendation No Care

## 2018-06-18 NOTE — NURSING
Pt dc per MD orders. Dc and prescription instructions given. Pt verbalizes understanding. PiV removed catheter tip intact. VSS  Pt ambulated off unit. Went to Cardiology Clinic.

## 2018-06-18 NOTE — PLAN OF CARE
CM met with patient for discharge planning.  Plan is to discharge home with no needs.    Pharmacy:    [id number] [place]  [address]  AT [facility] [address]  [address] [place] [address]  Phone: [phone] Fax: [phone]    [facility] [address] [place] [address]  Phone: [phone] Fax: [phone]    PCP:  January Khan MD    Payor: [employer] MEDICARE / Plan: [employer] HEALTH / Product Type: Medicare Advantage /         06/18/18 0930   Discharge Assessment   Assessment Type Discharge Planning Assessment   Confirmed/corrected address and phone number on facesheet? Yes   Assessment information obtained from? Patient   Expected Length of Stay (days) 2   Communicated expected length of stay with patient/caregiver yes   Prior to hospitilization cognitive status: Alert/Oriented   Prior to hospitalization functional status: Independent   Current cognitive status: Alert/Oriented   Current Functional Status: Independent   Facility Arrived From: Emergency room   Lives With parent(s);sibling(s)   Able to Return to Prior Arrangements yes   Is patient able to care for self after discharge? Yes   Who are your caregiver(s) and their phone number(s)? Allan Oneil - son [phone]   Patient's perception of discharge disposition home or selfcare   Readmission Within The Last 30 Days no previous admission in last 30 days   Patient currently being followed by outpatient case management? No   Patient currently receives any other outside agency services? No   Equipment Currently Used at Home none   Do you have any problems affording any of your prescribed medications? No   Is the patient taking medications as prescribed? yes   Does the patient have transportation home? Yes   Transportation Available family or friend will provide   Does the patient receive services at the Coumadin Clinic? No   Discharge Plan A Home with family    Discharge Plan B Home Health   Patient/Family In Agreement With Plan yes

## 2018-06-18 NOTE — DISCHARGE SUMMARY
[facility] Hospital Medicine  Discharge Summary      Patient Name: uAdrey Oneil Jr.  MRN: [id number]  Admission Date: 6/15/2018  Hospital Length of Stay: 0 days  Discharge Date and Time:  06/18/2018 4:30 PM  Attending Physician: No att. providers found   Discharging Provider: Pamela Turner MD  Primary Care Provider: January Khan MD  Hospital Medicine Team: [facility]  Pamela Turner MD    HPI:   68M with combined heart failure (EF 20% 4/18), CAD/MI's with stents, PE 13 yrs ago, HTN, CKD3 who presents after he coughed up a streak of dark blood today. He said it was a very small line of blood but he was concerned because the last time he coughed up blood, he was dx'ed with a PE 13 yrs ago. He states that he has been feeling weak and lightheaded the past four days, associated with decreased oral intake due to nausea. Denies vomiting, fever, chills, leg swelling, or chest pain.     * No surgery found *      Hospital Course:   Acute renal failure superimposed on stage 3 chronic kidney disease     Pre-renal  Cr 2.8 on admission (1.3-1.5 baseline).  Resolved on IVFs.   Resume home Lasix, but will d/c spironolactone for now until evaluated by PCP/Cards  Pt to follow-up with PCP and Cardiologist next week.              Hemoptysis     One episode, seems very minimal.  H/H normal.  DVT us negative.  CTA negative for PE                Elevated troponin     Denies CP. No ECG changes.   Trops flat.             Hyperkalemia  -Hold lisinopril until seen by outpt MD  -I don't think this was the cause of his K. Can likely resume at a later date.           Abnormal CT   -Increased liver heterogenocity possibly from chronic amio toxicity   -Defer to Cardiology   -LFTs WNL          Consults:     No new Assessment & Plan notes have been filed under this hospital service since the last note was generated.  Service: Hospital Medicine    Final Active Diagnoses:    Diagnosis Date Noted POA    PRINCIPAL PROBLEM:   Acute renal failure superimposed on stage 3 chronic kidney disease [N17.9, N18.3] 06/15/2018 Yes    Hemoptysis [R04.2] 06/15/2018 Yes    Elevated troponin [R74.8] 06/15/2018 Yes    Hyperkalemia [E87.5] 06/15/2018 Yes      Problems Resolved During this Admission:    Diagnosis Date Noted Date Resolved POA       Discharged Condition: good    Disposition: Home or Self Care    Follow Up:  Follow-up Information     Schedule an appointment as soon as possible for a visit with January Khan MD.    Specialty:  Internal Medicine  Contact information:  [address]  [place] [address]  [phone]             Schedule an appointment as soon as possible for a visit with Edison Marshall MD.    Specialties:  Transplant, Cardiology  Contact information:  [address] [facility] [id number] [phone]                 Patient Instructions:   No discharge procedures on file.    Significant Diagnostic Studies: Labs:   CMP   Recent Labs  Lab 06/17/18  0812 06/18/18  0539    137   K 5.0 5.4*    110   CO2 24 21*   GLU 86 104   BUN 44* 30*   CREATININE 1.4 1.6*   CALCIUM 9.1 8.7   PROT 6.7 6.0   ALBUMIN 3.8 3.4*   BILITOT 0.5 0.3   ALKPHOS 99 88   AST 28 21   ALT 44 35   ANIONGAP 7* 6*   ESTGFRAFRICA >60.0 51.1*   EGFRNONAA 52.0* 44.2*    and CBC   Recent Labs  Lab 06/17/18  0356 06/18/18  0539   WBC 5.08 6.20   HGB 12.1* 12.6*   HCT 36.6* 38.6*    230       Pending Diagnostic Studies:     None         Medications:  Reconciled Home Medications:      Medication List      CONTINUE taking these medications    allopurinol 100 MG tablet  Commonly known as:  ZYLOPRIM  Take 1 tablet (100 mg total) by mouth once daily.     amiodarone 200 MG Tab  Commonly known as:  PACERONE  TAKE 1 AND 1/2 TABLETS(300 MG) BY MOUTH EVERY DAY     aspirin 325 MG EC tablet  Commonly known as:  ECOTRIN  Take 325 mg by mouth once daily.     atorvastatin 80 MG tablet  Commonly known as:  LIPITOR  TAKE 1 TABLET(80 MG) BY MOUTH EVERY  DAY     clopidogrel 75 mg tablet  Commonly known as:  PLAVIX  Take 75 mg by mouth once daily.     docusate sodium 50 MG capsule  Commonly known as:  COLACE  Take 1 capsule (50 mg total) by mouth 2 (two) times daily.     furosemide 40 MG tablet  Commonly known as:  LASIX  TAKE 1 TABLET(40 MG) BY MOUTH EVERY DAY     HYDROcodone-acetaminophen  mg per tablet  Commonly known as:  NORCO  TK 1 T PO Q 8-10 H PRN P     metoprolol succinate 50 MG 24 hr tablet  Commonly known as:  TOPROL-XL  TAKE 1 TABLET BY MOUTH EVERY DAY     mupirocin 2 % ointment  Commonly known as:  BACTROBAN  Apply topically 3 (three) times daily.     neomycin-bacitracin-polymyxin-hydrocortisone ophthalmic ointment  Commonly known as:  CORTISPORIN  Place into both eyes 2 (two) times daily.        STOP taking these medications    lisinopril 5 MG tablet  Commonly known as:  PRINIVIL,ZESTRIL     spironolactone 25 MG tablet  Commonly known as:  ALDACTONE            Indwelling Lines/Drains at time of discharge:   Lines/Drains/Airways     Airway                 Airway - Non-Surgical 04/06/18 0805 Nasal Cannula 73 days          Epidural Line                 Perineural Analgesia/Anesthesia Assessment (using dermatomes) Epidural 12/08/16 1312 557 days                Time spent on the discharge of patient: 20 minutes  Patient was seen and examined on the date of discharge and determined to be suitable for discharge.         Pamela Turner MD  Department of Hospital Medicine  [facility]

## 2018-06-18 NOTE — PLAN OF CARE
Problem: Fall Risk (Adult)  Goal: Identify Related Risk Factors and Signs and Symptoms  Related risk factors and signs and symptoms are identified upon initiation of Human Response Clinical Practice Guideline (CPG)   Outcome: Outcome(s) achieved Date Met: 06/18/18  POC reviewed with pt. Safety precautions maintained. Pt free of falls. Denies pain or discomfort. Telemetry monitor in place, NSR noted. VSS.

## 2018-06-19 ENCOUNTER — TELEPHONE (OUTPATIENT)
Dept: ELECTROPHYSIOLOGY | Facility: CLINIC | Age: 68
End: 2018-06-19

## 2018-06-19 NOTE — TELEPHONE ENCOUNTER
Called and left several messages to schedule patient appointment with Debbi Werner his  ext. [phone]

## 2018-06-24 ENCOUNTER — HOSPITAL ENCOUNTER (EMERGENCY)
Facility: HOSPITAL | Age: 68
Discharge: HOME | End: 2018-06-24
Attending: EMERGENCY MEDICINE
Payer: MEDICARE

## 2018-06-24 VITALS
SYSTOLIC BLOOD PRESSURE: 119 MMHG | WEIGHT: 222 LBS | BODY MASS INDEX: 34.84 KG/M2 | HEIGHT: 67 IN | OXYGEN SATURATION: 98 % | RESPIRATION RATE: 16 BRPM | HEART RATE: 63 BPM | DIASTOLIC BLOOD PRESSURE: 68 MMHG | TEMPERATURE: 98 F

## 2018-06-24 DIAGNOSIS — M54.2 NECK PAIN: ICD-10-CM

## 2018-06-24 LAB
ANION GAP SERPL CALC-SCNC: 10 MMOL/L
BUN SERPL-MCNC: 33 MG/DL
CALCIUM SERPL-MCNC: 9.5 MG/DL
CHLORIDE SERPL-SCNC: 103 MMOL/L
CO2 SERPL-SCNC: 27 MMOL/L
CREAT SERPL-MCNC: 1.5 MG/DL
EST. GFR  (AFRICAN AMERICAN): 55.3 ML/MIN/1.73 M^2
EST. GFR  (NON AFRICAN AMERICAN): 47.8 ML/MIN/1.73 M^2
GLUCOSE SERPL-MCNC: 110 MG/DL
MAGNESIUM SERPL-MCNC: 2.2 MG/DL
PHOSPHATE SERPL-MCNC: 3.5 MG/DL
POTASSIUM SERPL-SCNC: 3.9 MMOL/L
SODIUM SERPL-SCNC: 140 MMOL/L

## 2018-06-24 PROCEDURE — 25000003 PHARM REV CODE 250: Mod: NTX | Performed by: STUDENT IN AN ORGANIZED HEALTH CARE EDUCATION/TRAINING PROGRAM

## 2018-06-24 PROCEDURE — 99283 EMERGENCY DEPT VISIT LOW MDM: CPT | Mod: ,,, | Performed by: EMERGENCY MEDICINE

## 2018-06-24 PROCEDURE — 99284 EMERGENCY DEPT VISIT MOD MDM: CPT | Mod: 25,NTX

## 2018-06-24 PROCEDURE — 93005 ELECTROCARDIOGRAM TRACING: CPT | Mod: NTX

## 2018-06-24 PROCEDURE — 84100 ASSAY OF PHOSPHORUS: CPT | Mod: NTX

## 2018-06-24 PROCEDURE — 80048 BASIC METABOLIC PNL TOTAL CA: CPT | Mod: NTX

## 2018-06-24 PROCEDURE — 83735 ASSAY OF MAGNESIUM: CPT | Mod: NTX

## 2018-06-24 PROCEDURE — 93010 ELECTROCARDIOGRAM REPORT: CPT | Mod: NTX,,, | Performed by: INTERNAL MEDICINE

## 2018-06-24 RX ORDER — DIAZEPAM 5 MG/1
5 TABLET ORAL EVERY 6 HOURS PRN
Qty: 8 TABLET | Refills: 0 | Status: SHIPPED | OUTPATIENT
Start: 2018-06-24 | End: 2018-06-26

## 2018-06-24 RX ORDER — ACETAMINOPHEN 325 MG/1
650 TABLET ORAL
Status: COMPLETED | OUTPATIENT
Start: 2018-06-24 | End: 2018-06-24

## 2018-06-24 RX ORDER — CYCLOBENZAPRINE HCL 10 MG
10 TABLET ORAL 3 TIMES DAILY PRN
Qty: 15 TABLET | Refills: 0 | Status: SHIPPED | OUTPATIENT
Start: 2018-06-24 | End: 2018-06-29

## 2018-06-24 RX ORDER — CYCLOBENZAPRINE HCL 10 MG
10 TABLET ORAL
Status: COMPLETED | OUTPATIENT
Start: 2018-06-24 | End: 2018-06-24

## 2018-06-24 RX ADMIN — CYCLOBENZAPRINE HYDROCHLORIDE 10 MG: 10 TABLET, FILM COATED ORAL at 12:06

## 2018-06-24 RX ADMIN — ACETAMINOPHEN 650 MG: 325 TABLET ORAL at 12:06

## 2018-06-24 NOTE — ED PROVIDER NOTES
Encounter Date: 6/24/2018       History     Chief Complaint   Patient presents with    Neck Pain     and stiffness x 2 days     68-year-old male past medical history of coronary artery disease, CHF, stage III CKD presents with 6 days of right-sided neck pain.  Patient reports that ever since being discharged from the hospital for an LAVERNE he has had this right-sided neck pain.  Denies any trauma or inciting event.  Denies any radicular symptoms numbness or paresthesias.  Denies any problems with small objects, changes in handwriting, difficulties with bladder or bowel function.  Denies any infectious symptoms.          Review of patient's allergies indicates:   Allergen Reactions    Iodine containing multivitamin Swelling     itching    Keflex [cephalexin] Swelling     eyes    Peaches [peach (prunus persica)] Swelling     eyes    Shellfish containing products Swelling    Fig tree Swelling     itching    Tuberculin spenser test ppd Rash     Past Medical History:   Diagnosis Date    Chronic anticoagulation 5/5/2016    Chronic combined systolic and diastolic heart failure 11/26/2012    EF 10-20% on ECHO 2013    Clotting disorder     Coronary artery disease involving native coronary artery of native heart without angina pectoris 11/26/2012    Cath 10- Stents patent non-obstructive disease Cath 11-12015 non-obstructive disease     Diverticulosis of colon     DVT (deep venous thrombosis), unspecified laterality 11/12/2015    Essential hypertension 11/15/2015    Hyperlipidemia     Hypertensive heart disease with heart failure 5/5/2016    MI (myocardial infarction) 2009    x's 5    Nicotine abuse     Obesity 11/26/2012    Pulmonary embolism 2011    Renal disorder     LAVERNE    Stented coronary artery 11/26/2012    LAD stent placed 10/17/2007      Past Surgical History:   Procedure Laterality Date    APPENDECTOMY      BACK SURGERY      CARDIAC DEFIBRILLATOR PLACEMENT      CARDIAC SURGERY  2007     stent    CARPAL TUNNEL RELEASE Right 04/2018    r knee scope      SPINE SURGERY      TONSILLECTOMY      TRIGGER FINGER RELEASE Right 04/2018    thumb     Family History   Problem Relation Age of Onset    Cancer Mother         colon cancer    Heart disease Mother     Diabetes Mother     Heart disease Father     Stroke Father     Colon polyps Father     Cancer Paternal Grandmother         leukemia    No Known Problems Sister     No Known Problems Daughter     No Known Problems Son     No Known Problems Sister     No Known Problems Son      Social History   Substance Use Topics    Smoking status: Former Smoker     Packs/day: 1.00     Years: 45.00     Types: Cigarettes     Quit date: 12/14/2005    Smokeless tobacco: Never Used      Comment: 1-1.5 ppd every day.    Alcohol use No     Review of Systems   Constitutional: Negative for fever.   HENT: Negative for sore throat.    Respiratory: Negative for shortness of breath.    Cardiovascular: Negative for chest pain.   Gastrointestinal: Negative for nausea.   Genitourinary: Negative for dysuria.   Musculoskeletal: Positive for neck pain. Negative for back pain.   Skin: Negative for rash.   Neurological: Negative for weakness.   Hematological: Does not bruise/bleed easily.       Physical Exam     Initial Vitals [06/24/18 1039]   BP Pulse Resp Temp SpO2   119/68 63 16 98.3 °F (36.8 °C) 98 %      MAP       --         Physical Exam    Constitutional: He appears well-developed and well-nourished.   HENT:   Head: Normocephalic and atraumatic.   Eyes: Conjunctivae and EOM are normal.   Neck: Normal range of motion. No tracheal deviation present.   Cardiovascular: Normal rate and intact distal pulses.   Pulmonary/Chest: No respiratory distress.   Abdominal: Soft. He exhibits no distension.   Musculoskeletal: Normal range of motion. He exhibits no edema.   Tender to palpation over right lateral spinal musculature.  Neurovascularly intact distally, sensation  intact light touch throughout upper extremities  Motor 5/5 all major muscle groups of UE  Mild right sided pain with range of motion of neck  Negative Spurling   Neurological: He is alert and oriented to person, place, and time.   Skin: Skin is warm and dry.   Psychiatric: He has a normal mood and affect. His behavior is normal. Judgment and thought content normal.         ED Course   Procedures  Labs Reviewed   BASIC METABOLIC PANEL - Abnormal; Notable for the following:        Result Value    BUN, Bld 33 (*)     Creatinine 1.5 (*)     eGFR if  55.3 (*)     eGFR if non  47.8 (*)     All other components within normal limits   MAGNESIUM   PHOSPHORUS          Imaging Results    None                APC / Resident Notes:   68-year-old male with past medical history of coronary artery disease, CHF, chronic disease presents with right lateral neck pain.  Patient appears to be muscular in origin with negative radicular myelopathic symptoms.  We'll obtain metabolic profile is patient was recently discharged from the hospital with LAVERNE.  We'll treat symptomatically                 Clinical Impression:   The encounter diagnosis was Neck pain.            I have reviewed and concur with the residents's history, physical, assessment, and plan.  I have personally interviewed and examined the patient at bedside.        Chart reviewed, seen on jan 28. 2018 for same complaint. Had CTA head and neck and ENT consult (scope) w/ negative results  No neuro deficit, no carotid bruit today, no recent trauma  Cr at baseline, electrolytes wnl               Nara Lopez MD  06/24/18 0770

## 2018-06-24 NOTE — ED TRIAGE NOTES
"Patient states neck becoming sore, throat "throbbing" with left neck pain that radiates to head, has been seen for same prior. Worse x 2 days. Norco last night, states "not working"   "

## 2018-06-24 NOTE — ED NOTES
Patient identifiers verified and correct for Mr Oneil  C/C: Pain to left throat, neck pain   APPEARANCE: awake and alert in NAD.  SKIN: warm, dry and intact. No breakdown or bruising.  MUSCULOSKELETAL: Patient moving all extremities spontaneously, no obvious swelling or deformities noted. Ambulates independently.  RESPIRATORY: Denies shortness of breath.Respirations unlabored.   CARDIAC: Denies CP, 2+ distal pulses; no peripheral edema  ABDOMEN: S/ND/NT, Denies nausea  : voids spontaneously, denies difficulty  Neurologic: AAO x 4; follows commands equal strength in all extremities; denies numbness/tingling. Denies dizziness Denies weakness, pain in left neck, shoulder and head pain

## 2018-06-25 ENCOUNTER — HOSPITAL ENCOUNTER (EMERGENCY)
Facility: HOSPITAL | Age: 68
Discharge: HOME | End: 2018-06-25
Attending: EMERGENCY MEDICINE
Payer: MEDICARE

## 2018-06-25 VITALS
WEIGHT: 220 LBS | BODY MASS INDEX: 34.53 KG/M2 | SYSTOLIC BLOOD PRESSURE: 111 MMHG | DIASTOLIC BLOOD PRESSURE: 59 MMHG | HEIGHT: 67 IN | HEART RATE: 68 BPM | TEMPERATURE: 98 F | OXYGEN SATURATION: 97 % | RESPIRATION RATE: 18 BRPM

## 2018-06-25 DIAGNOSIS — R51.9 ACUTE NONINTRACTABLE HEADACHE, UNSPECIFIED HEADACHE TYPE: Primary | ICD-10-CM

## 2018-06-25 PROCEDURE — 99284 EMERGENCY DEPT VISIT MOD MDM: CPT | Mod: ,,, | Performed by: EMERGENCY MEDICINE

## 2018-06-25 PROCEDURE — 99284 EMERGENCY DEPT VISIT MOD MDM: CPT | Mod: 25,NTX

## 2018-06-25 PROCEDURE — 25000003 PHARM REV CODE 250: Mod: NTX | Performed by: PHYSICIAN ASSISTANT

## 2018-06-25 PROCEDURE — 96374 THER/PROPH/DIAG INJ IV PUSH: CPT | Mod: NTX

## 2018-06-25 PROCEDURE — 63600175 PHARM REV CODE 636 W HCPCS: Mod: NTX | Performed by: PHYSICIAN ASSISTANT

## 2018-06-25 PROCEDURE — 96375 TX/PRO/DX INJ NEW DRUG ADDON: CPT | Mod: NTX

## 2018-06-25 RX ORDER — KETOROLAC TROMETHAMINE 30 MG/ML
10 INJECTION, SOLUTION INTRAMUSCULAR; INTRAVENOUS
Status: COMPLETED | OUTPATIENT
Start: 2018-06-25 | End: 2018-06-25

## 2018-06-25 RX ORDER — PROCHLORPERAZINE MALEATE 5 MG
10 TABLET ORAL
Status: COMPLETED | OUTPATIENT
Start: 2018-06-25 | End: 2018-06-25

## 2018-06-25 RX ORDER — DEXAMETHASONE SODIUM PHOSPHATE 4 MG/ML
8 INJECTION, SOLUTION INTRA-ARTICULAR; INTRALESIONAL; INTRAMUSCULAR; INTRAVENOUS; SOFT TISSUE
Status: COMPLETED | OUTPATIENT
Start: 2018-06-25 | End: 2018-06-25

## 2018-06-25 RX ORDER — ATORVASTATIN CALCIUM 80 MG/1
TABLET, FILM COATED ORAL
Qty: 90 TABLET | Refills: 0 | Status: SHIPPED | OUTPATIENT
Start: 2018-06-25 | End: 2018-06-26 | Stop reason: SDUPTHER

## 2018-06-25 RX ORDER — DIPHENHYDRAMINE HCL 25 MG
25 CAPSULE ORAL
Status: COMPLETED | OUTPATIENT
Start: 2018-06-25 | End: 2018-06-25

## 2018-06-25 RX ADMIN — DIPHENHYDRAMINE HYDROCHLORIDE 25 MG: 25 CAPSULE ORAL at 08:06

## 2018-06-25 RX ADMIN — PROCHLORPERAZINE MALEATE 10 MG: 5 TABLET, FILM COATED ORAL at 08:06

## 2018-06-25 RX ADMIN — DEXAMETHASONE SODIUM PHOSPHATE 8 MG: 4 INJECTION, SOLUTION INTRAMUSCULAR; INTRAVENOUS at 08:06

## 2018-06-25 RX ADMIN — KETOROLAC TROMETHAMINE 10 MG: 30 INJECTION, SOLUTION INTRAMUSCULAR at 08:06

## 2018-06-25 NOTE — ED PROVIDER NOTES
Encounter Date: 6/25/2018    SCRIBE #1 NOTE: I, Lisa Casillas, am scribing for, and in the presence of,  Dr. Fuentes. I have scribed the following portions of the note - the APC attestation.       History     Chief Complaint   Patient presents with    Neck Pain     seen here yest, my whole head is throbbing, ,      68-year-old male with multiple comorbidities including CAD, CHF, CKD 3 presents for throbbing headache X 3-4 days.  Patient reports he was recently admitted for LAVERNE and has had a headache ever since discharge. Reports associated bilateral neck pain and difficulty swallowing.  Tolerating liquids, however he states he has not been eating much.  States that he cannot sleep because he is so uncomfortable.  Patient seen yesterday for same complaint, prescribed Flexeril which he has been taking with Tylenol, he states he has no relief.  Denies photophobia, phonophobia, fevers, nausea/vomiting, trouble with gait or speech, weakness, confusion or shortness of breath.          Review of patient's allergies indicates:   Allergen Reactions    Iodine containing multivitamin Swelling     itching    Keflex [cephalexin] Swelling     eyes    Peaches [peach (prunus persica)] Swelling     eyes    Shellfish containing products Swelling    Fig tree Swelling     itching    Tuberculin spenser test ppd Rash     Past Medical History:   Diagnosis Date    Chronic anticoagulation 5/5/2016    Chronic combined systolic and diastolic heart failure 11/26/2012    EF 10-20% on ECHO 2013    Clotting disorder     Coronary artery disease involving native coronary artery of native heart without angina pectoris 11/26/2012    Cath 10- Stents patent non-obstructive disease Cath 11-12015 non-obstructive disease     Diverticulosis of colon     DVT (deep venous thrombosis), unspecified laterality 11/12/2015    Essential hypertension 11/15/2015    Hyperlipidemia     Hypertensive heart disease with heart failure 5/5/2016    MI  (myocardial infarction) 2009    x's 5    Nicotine abuse     Obesity 11/26/2012    Pulmonary embolism 2011    Renal disorder     LAVERNE    Stented coronary artery 11/26/2012    LAD stent placed 10/17/2007      Past Surgical History:   Procedure Laterality Date    APPENDECTOMY      BACK SURGERY      CARDIAC DEFIBRILLATOR PLACEMENT      CARDIAC SURGERY  2007    stent    CARPAL TUNNEL RELEASE Right 04/2018    r knee scope      SPINE SURGERY      TONSILLECTOMY      TRIGGER FINGER RELEASE Right 04/2018    thumb     Social History   Substance Use Topics    Smoking status: Former Smoker     Packs/day: 1.00     Years: 45.00     Types: Cigarettes     Quit date: 12/14/2005    Smokeless tobacco: Never Used      Comment: 1-1.5 ppd every day.    Alcohol use No     Review of Systems   Constitutional: Negative for fatigue and fever.   HENT: Positive for trouble swallowing. Negative for facial swelling, sore throat and voice change.    Respiratory: Negative for cough and shortness of breath.    Cardiovascular: Negative for chest pain and palpitations.   Gastrointestinal: Negative for abdominal pain, constipation, diarrhea, nausea and vomiting.   Genitourinary: Negative for dysuria.   Musculoskeletal: Positive for neck pain. Negative for arthralgias, back pain, gait problem and neck stiffness.   Skin: Negative for rash.   Neurological: Positive for headaches. Negative for seizures, facial asymmetry, weakness, light-headedness and numbness.   Psychiatric/Behavioral: Negative for confusion.       Physical Exam     Initial Vitals [06/25/18 0700]   BP Pulse Resp Temp SpO2   (!) 111/59 68 18 97.8 °F (36.6 °C) 97 %      MAP       --         Physical Exam    Nursing note and vitals reviewed.  Constitutional: He is not diaphoretic. He appears distressed.   Appear disheveled   HENT:   Head: Normocephalic and atraumatic.   Mouth/Throat: Oropharynx is clear and moist. No oropharyngeal exudate.   Eyes: EOM are normal. Pupils are  HPI:   Michelle Gould, (has different prior MRN [id number],  1953). Transferred from [facility] as Edilma Richardson ( 1953).    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 Admitted after she was reportedly found down and confused - by mother who called 911. No family members at the bedside, history obtained from med records and ED staff. At [place], per report, CSF showed , , glucose 98, protein 240. Febrile to 100.6, tachy to 156, 98% RA, 158/88.  Reportedly seized x3 at OSH at was given Ativan.  Also received Vanco 1 gr + Ceftriaxone 2 gr IV once at [facility].   CSF and BCx, and UCx sent at [facility], confirmed with their lab - tel. [phone]  On arrival to [facility] patient was GCS 9.    Overnight Events: more awake, alert overnight. Follows commands now.    ROS: negative [x] unable to obtain as patient is comatose/intubated/aphasic []   VITALS:   T(C): 37.3 (18 @ 03:00), Max: 38.6 (18 @ 14:25)  HR: 86 (18 @ 06:00) (75 - 156)  BP: 126/78 (18 @ 06:00) (111/67 - 176/92)  RR: 24 (18 @ 06:00) (11 - 34)  SpO2: 98% (18 @ 06:00) (95% - 100%)    18 @ 07:01  -  18 @ 07:00  --------------------------------------------------------  IN: 3165 mL / OUT: 780 mL / NET: 2385 mL      LABS:                          13.4   16.8  )-----------( 317      ( 2018 23:29 )             39.2         135  |  99  |  10  ----------------------------<  99  3.6   |  24  |  0.78    Ca    8.6      2018 23:29  Phos  2.6     -  Mg     1.1         TPro  6.0  /  Alb  3.4  /  TBili  2.1<H>  /  DBili  0.4<H>  /  AST  32  /  ALT  44  /  AlkPhos  66      PT/INR - ( 2018 15:12 )   PT: 11.0 sec;   INR: 1.02 ratio         PTT - ( 2018 15:12 )  PTT:24.6 sec    MEDS:  MEDICATIONS  (STANDING):  cefepime  IVPB      cefepime  IVPB 2000 milliGRAM(s) IV Intermittent every 8 hours  docusate sodium 100 milliGRAM(s) Oral three times a day  enoxaparin Injectable 40 milliGRAM(s) SubCutaneous <User Schedule>  levETIRAcetam  IVPB 1000 milliGRAM(s) IV Intermittent every 12 hours  nystatin Cream 1 Application(s) Topical two times a day  sodium chloride 0.9% with potassium chloride 20 mEq/L 1000 milliLiter(s) (75 mL/Hr) IV Continuous <Continuous>  thiamine 300 milliGRAM(s) Oral daily  vancomycin  IVPB 1000 milliGRAM(s) IV Intermittent every 12 hours      [All pertinent recent Imaging/Reports reviewed]    DEVICES: [] Restraints [] MICHELLE/HMV []LD [] ET tube [] Trach [] A-line [] Schultz [] NGT [] Rectal Tube       EXAMINATION:  Neurologic Examination: Alert, roiented x2, verbal, ?right CN6 palsy and Right facial droop, FC, LONG spontaneously.  Chest CTAB.  S1S2 present.   lungs good breath sounds bilaterally   Abd: soft, nontender  ext: no edema equal, round, and reactive to light.   Neck: Normal range of motion. Neck supple.   No posterior midline tenderness. Tenderness to bilateral    Cardiovascular: Normal rate, regular rhythm and normal heart sounds. Exam reveals no gallop and no friction rub.    No murmur heard.  Pulmonary/Chest: Breath sounds normal. No respiratory distress. He has no wheezes. He has no rhonchi. He has no rales. He exhibits no tenderness.   Abdominal: Soft. Bowel sounds are normal. He exhibits no distension and no mass. There is no tenderness. There is no rebound and no guarding.   Musculoskeletal: Normal range of motion. He exhibits no edema or tenderness.   Neurological: He is alert and oriented to person, place, and time. He has normal strength. No cranial nerve deficit or sensory deficit.   Skin: Skin is warm and dry.   Psychiatric: He has a normal mood and affect.         ED Course   Procedures  Labs Reviewed - No data to display       Imaging Results          CT Head Without Contrast (Final result)  Result time 06/25/18 09:11:02    Final result by Jamie Hyatt DO (06/25/18 09:11:02)                 Impression:      Thin extra-axial hypodense collection overlies the right cerebral convexity relatively unchanged from prior suggestive for subdural hygroma versus chronic hematoma.    Otherwise unremarkable noncontrast CT head specifically without evidence for acute intracranial hemorrhage or new abnormal parenchymal attenuation.    Clinical correlation and further evaluation as warranted.      Electronically signed by: Jamie Hyatt DO  Date:    06/25/2018  Time:    09:11             Narrative:    EXAMINATION:  CT HEAD WITHOUT CONTRAST    CLINICAL HISTORY:  Headache, acute, norm neuro exam;    TECHNIQUE:  Multiple sequential 5 mm axial images of the head without contrast.  Coronal and sagittal reformatted imaging from the axial acquisition.    COMPARISON:  01/27/2018    FINDINGS:  There is a thin extra-axial hypodense collection  overlying the right cerebral convexity which is similar to prior suggestive for subdural hygroma versus chronic hematoma.  There is slight mass effect on the right cerebral hemisphere with approximate 4 mm of leftward midline shift with leftward bowing of the septum pellucidum similar to prior.  Punctate hypodensity within the left caudate suggestive for prominent perivascular space versus remote lacunar type infarcts unchanged.    There is no evidence for acute intracranial hemorrhage or new abnormal parenchymal attenuation.  The ventricles are normal in size without hydrocephalus.  Visualized paranasal sinuses and mastoid air cells are clear.                                 Medical Decision Making:   History:   Old Medical Records: I decided to obtain old medical records.  Clinical Tests:   Radiological Study: Ordered and Reviewed       APC / Resident Notes:   68-year-old male presenting with throbbing headache and neck pain times 3-4 days.  Patient seen yesterday for same complaint, he states that the pain is unbearable he was unable sleep.  Given severity of headache and comorbidities, will do CT head, give fluids, Toradol, Benadryl, Compazine, dexamethasone and reassess.    CT unchanged from prior.  On reassessment, patient is sleeping in the recliner.  Woke patient up after several hours of sleeping, he states he feels much better.  Discussed results of CT.  Given symptomatic improvement and negative workup, I do not believe he requires further ED treatment and is stable for discharge. Discussed the importance of follow-up as well as ED return precautions.  Patient voiced understanding and is comfortable with discharge. I discussed this patient with my supervising physician.    YAHAIRA Vuongibalda Attestation:   Scribe #1: I performed the above scribed service and the documentation accurately describes the services I performed. I attest to the accuracy of the note.    Attending  Attestation:     Physician Attestation Statement for NP/PA:   I have conducted a face to face encounter with this patient in addition to the NP/PA, due to Medical Complexity    Other NP/PA Attestation Additions:    History of Present Illness: 68 y.o. male with extensive PMHx including CAD, CKD and CHF presents with posterior neck throbbing and HA since discharge from the hospital. He has no fever and no nuchal rigidity to suggest meningitis. His cranial nerves are intact without carotid bruit, I doubt carotid dissection. Overall has reproducible tenderness on the right SCM. CTH without acute process. Overall presentation concerning for muscular spasm. Provided with return precautions.          Physician Attestation for Scribe:      Comments: I, Dr. Tony Fuentes, personally performed the services described in this documentation. All medical record entries made by the scribe were at my direction and in my presence.  I have reviewed the chart and agree that the record reflects my personal performance and is accurate and complete. Tony Fuentes MD.  12:09 PM 06/25/2018                 Clinical Impression:   The encounter diagnosis was Acute nonintractable headache, unspecified headache type.      Disposition:   Disposition: Discharged  Condition: Stable                        Laureen Gonzalez PA-C  06/25/18 [id number]

## 2018-06-25 NOTE — ED TRIAGE NOTES
Patient states he was here yesterday for neck pain and was prescribed Flexeril.  Patient states his neck pain is worse this morning and his head is throbbing.  Patient states he could not sleep last night because he could not lie flat.

## 2018-06-26 ENCOUNTER — DOCUMENTATION ONLY (OUTPATIENT)
Dept: TRANSPLANT | Facility: CLINIC | Age: 68
End: 2018-06-26

## 2018-06-26 ENCOUNTER — OFFICE VISIT (OUTPATIENT)
Dept: TRANSPLANT | Facility: CLINIC | Age: 68
End: 2018-06-26
Payer: MEDICARE

## 2018-06-26 ENCOUNTER — LAB VISIT (OUTPATIENT)
Dept: LAB | Facility: HOSPITAL | Age: 68
End: 2018-06-26
Attending: INTERNAL MEDICINE
Payer: MEDICARE

## 2018-06-26 ENCOUNTER — TELEPHONE (OUTPATIENT)
Dept: INTERNAL MEDICINE | Facility: CLINIC | Age: 68
End: 2018-06-26

## 2018-06-26 VITALS
HEIGHT: 67 IN | OXYGEN SATURATION: 96 % | SYSTOLIC BLOOD PRESSURE: 107 MMHG | WEIGHT: 220.69 LBS | DIASTOLIC BLOOD PRESSURE: 59 MMHG | HEART RATE: 66 BPM | BODY MASS INDEX: 34.64 KG/M2

## 2018-06-26 DIAGNOSIS — D63.8 ANEMIA OF CHRONIC DISEASE: ICD-10-CM

## 2018-06-26 DIAGNOSIS — N18.30 CKD (CHRONIC KIDNEY DISEASE), STAGE III: ICD-10-CM

## 2018-06-26 DIAGNOSIS — I25.10 CORONARY ARTERY DISEASE INVOLVING NATIVE CORONARY ARTERY OF NATIVE HEART WITHOUT ANGINA PECTORIS: ICD-10-CM

## 2018-06-26 DIAGNOSIS — I25.5 CARDIOMYOPATHY, ISCHEMIC: Primary | Chronic | ICD-10-CM

## 2018-06-26 DIAGNOSIS — I44.7 LBBB (LEFT BUNDLE BRANCH BLOCK): ICD-10-CM

## 2018-06-26 DIAGNOSIS — I10 ESSENTIAL HYPERTENSION: ICD-10-CM

## 2018-06-26 DIAGNOSIS — I50.42 CHRONIC COMBINED SYSTOLIC AND DIASTOLIC HEART FAILURE: Chronic | ICD-10-CM

## 2018-06-26 DIAGNOSIS — E66.01 SEVERE OBESITY (BMI 35.0-39.9) WITH COMORBIDITY: Chronic | ICD-10-CM

## 2018-06-26 DIAGNOSIS — Z95.810 AICD (AUTOMATIC CARDIOVERTER/DEFIBRILLATOR) PRESENT: ICD-10-CM

## 2018-06-26 DIAGNOSIS — Z86.718 HISTORY OF DVT (DEEP VEIN THROMBOSIS): Chronic | ICD-10-CM

## 2018-06-26 DIAGNOSIS — Z86.711 HX PULMONARY EMBOLISM: ICD-10-CM

## 2018-06-26 LAB
ALBUMIN SERPL BCP-MCNC: 3.7 G/DL
ALP SERPL-CCNC: 113 U/L
ALT SERPL W/O P-5'-P-CCNC: 39 U/L
ANION GAP SERPL CALC-SCNC: 10 MMOL/L
ANION GAP SERPL CALC-SCNC: 10 MMOL/L
AST SERPL-CCNC: 24 U/L
BILIRUB SERPL-MCNC: 0.4 MG/DL
BUN SERPL-MCNC: 50 MG/DL
BUN SERPL-MCNC: 50 MG/DL
CALCIUM SERPL-MCNC: 9.6 MG/DL
CALCIUM SERPL-MCNC: 9.6 MG/DL
CHLORIDE SERPL-SCNC: 103 MMOL/L
CHLORIDE SERPL-SCNC: 103 MMOL/L
CO2 SERPL-SCNC: 28 MMOL/L
CO2 SERPL-SCNC: 28 MMOL/L
CREAT SERPL-MCNC: 1.9 MG/DL
CREAT SERPL-MCNC: 1.9 MG/DL
EST. GFR  (AFRICAN AMERICAN): 41.5 ML/MIN/1.73 M^2
EST. GFR  (AFRICAN AMERICAN): 41.5 ML/MIN/1.73 M^2
EST. GFR  (NON AFRICAN AMERICAN): 35.9 ML/MIN/1.73 M^2
EST. GFR  (NON AFRICAN AMERICAN): 35.9 ML/MIN/1.73 M^2
GLUCOSE SERPL-MCNC: 114 MG/DL
GLUCOSE SERPL-MCNC: 114 MG/DL
INR PPP: 1.1
POTASSIUM SERPL-SCNC: 4.5 MMOL/L
POTASSIUM SERPL-SCNC: 4.5 MMOL/L
PROT SERPL-MCNC: 7 G/DL
PROTHROMBIN TIME: 11 SEC
SODIUM SERPL-SCNC: 141 MMOL/L
SODIUM SERPL-SCNC: 141 MMOL/L

## 2018-06-26 PROCEDURE — 3078F DIAST BP <80 MM HG: CPT | Mod: CPTII,S$GLB,TXP, | Performed by: INTERNAL MEDICINE

## 2018-06-26 PROCEDURE — 85610 PROTHROMBIN TIME: CPT | Mod: TXP

## 2018-06-26 PROCEDURE — 80053 COMPREHEN METABOLIC PANEL: CPT | Mod: NTX

## 2018-06-26 PROCEDURE — 99999 PR PBB SHADOW E&M-EST. PATIENT-LVL IV: CPT | Mod: PBBFAC,TXP,, | Performed by: INTERNAL MEDICINE

## 2018-06-26 PROCEDURE — 3074F SYST BP LT 130 MM HG: CPT | Mod: CPTII,S$GLB,TXP, | Performed by: INTERNAL MEDICINE

## 2018-06-26 PROCEDURE — 99215 OFFICE O/P EST HI 40 MIN: CPT | Mod: S$GLB,TXP,, | Performed by: INTERNAL MEDICINE

## 2018-06-26 PROCEDURE — 36415 COLL VENOUS BLD VENIPUNCTURE: CPT | Mod: NTX

## 2018-06-26 PROCEDURE — 99499 UNLISTED E&M SERVICE: CPT | Mod: S$GLB,,, | Performed by: INTERNAL MEDICINE

## 2018-06-26 RX ORDER — ATORVASTATIN CALCIUM 80 MG/1
TABLET, FILM COATED ORAL
Qty: 90 TABLET | Refills: 3 | Status: SHIPPED | OUTPATIENT
Start: 2018-06-26 | End: 2019-06-30 | Stop reason: SDUPTHER

## 2018-06-26 RX ORDER — CLOPIDOGREL BISULFATE 75 MG/1
75 TABLET ORAL DAILY
Qty: 90 TABLET | Refills: 3 | Status: SHIPPED | OUTPATIENT
Start: 2018-06-26 | End: 2019-06-30 | Stop reason: SDUPTHER

## 2018-06-26 NOTE — PROGRESS NOTES
Subjective:     HPI:  Mr. Oneil is a very pleasant 67 y.o. male with a hx of CAD s/p STEMI (s/p PCI LAD 2007), CHF/ICM and remote MI, quit smoking Dec 2015,  PE (2010, s/p 1-yr coumadin), HTN, DLP and s/p ICD [facility] placement due to VT who comes for a regular follow-up. I saw him back in 6/12/17 and had RHC 7/10/17 (see below). He has been declining over the last several months and most recent CPX (2/15/17) that showed low Peak VO2 of 7.5ml/kg/min but AT was not attained and RER was only 0.67 (poor effort). Most recent 2D echo showed severely depressed LV function with an EF=10-15%, LV with a LVEDD of 5.9  cm, normal RV size and function.    Recently discharged from the emergency room with acute decompensated and acute renal failure. Now doing well FC II NYHA. Off lisinopril and aldactone because for hyperkalemia        RHC 7/2017:  RA: 5/4 (1)  RV: 31/-6  PW:  (6)  PA: 32/2 (15)  AO: 120/64 (91)  PA_SAT: 64    CONDITION 1:  FICKCI: 2.1400  FICKCO: 4.6600    Past Medical History:   Diagnosis Date    Chronic anticoagulation 5/5/2016    Chronic combined systolic and diastolic heart failure 11/26/2012    EF 10-20% on ECHO 2013    Clotting disorder     Coronary artery disease involving native coronary artery of native heart without angina pectoris 11/26/2012    Cath 10- Stents patent non-obstructive disease Cath 11-12015 non-obstructive disease     Diverticulosis of colon     DVT (deep venous thrombosis), unspecified laterality 11/12/2015    Essential hypertension 11/15/2015    Hyperlipidemia     Hypertensive heart disease with heart failure 5/5/2016    MI (myocardial infarction) 2009    x's 5    Nicotine abuse     Obesity 11/26/2012    Pulmonary embolism 2011    Renal disorder     LAVERNE    Stented coronary artery 11/26/2012    LAD stent placed 10/17/2007      Past Surgical History:   Procedure Laterality Date    APPENDECTOMY      BACK SURGERY      CARDIAC DEFIBRILLATOR PLACEMENT      CARDIAC  "SURGERY  2007    stent    CARPAL TUNNEL RELEASE Right 04/2018    r knee scope      SPINE SURGERY      TONSILLECTOMY      TRIGGER FINGER RELEASE Right 04/2018    thumb       Review of Systems   Constitution: Negative. Negative for chills, decreased appetite, diaphoresis, fever, weakness, malaise/fatigue, night sweats, weight gain and weight loss.   Eyes: Negative.    Cardiovascular: Positive for dyspnea on exertion. Negative for chest pain, claudication, cyanosis, irregular heartbeat, leg swelling, near-syncope, orthopnea, palpitations, paroxysmal nocturnal dyspnea and syncope.   Respiratory: Negative for cough, hemoptysis and shortness of breath.    Endocrine: Negative.    Hematologic/Lymphatic: Negative.    Skin: Negative for color change, dry skin and nail changes.   Musculoskeletal: Negative.    Gastrointestinal: Negative.    Genitourinary: Negative.        Objective:   Blood pressure (!) 107/59, pulse 66, height 5' 7" (1.702 m), weight 100.1 kg (220 lb 10.9 oz), SpO2 96 %.body mass index is 34.56 kg/m².  Physical Exam   Constitutional: He appears well-developed.   BP (!) 68/50 (BP Location: Left arm, Patient Position: Sitting, BP Method: Large (Automatic))   Pulse 64   Ht 5' 7" (1.702 m)   Wt 105.8 kg (233 lb 4 oz)   SpO2 97%   BMI 36.53 kg/m²      HENT:   Head: Normocephalic.   Neck: No JVD present. Carotid bruit is not present.   Cardiovascular: Regular rhythm and normal pulses.  PMI is displaced.  Exam reveals no gallop and no S3.    No murmur heard.  Pulmonary/Chest: Effort normal and breath sounds normal. No respiratory distress. He has no wheezes. He has no rales.   Abdominal: Soft. Bowel sounds are normal. He exhibits no distension. There is no tenderness. There is no rebound.   Musculoskeletal: He exhibits no edema.   Neurological: He is alert.   Skin: Skin is warm.   Vitals reviewed.      Labs:    Chemistry        Component Value Date/Time     06/24/2018 1143    K 3.9 06/24/2018 1143    "  06/24/2018 1143    CO2 27 06/24/2018 1143    BUN 33 (H) 06/24/2018 1143    CREATININE 1.5 (H) 06/24/2018 1143     06/24/2018 1143        Component Value Date/Time    CALCIUM 9.5 06/24/2018 1143    ALKPHOS 88 06/18/2018 0539    AST 21 06/18/2018 0539    ALT 35 06/18/2018 0539    BILITOT 0.3 06/18/2018 0539    ESTGFRAFRICA 55.3 (A) 06/24/2018 1143    EGFRNONAA 47.8 (A) 06/24/2018 1143          Magnesium   Date Value Ref Range Status   06/24/2018 2.2 1.6 - 2.6 mg/dL Final     Lab Results   Component Value Date    WBC 6.20 06/18/2018    HGB 12.6 (L) 06/18/2018    HCT 38.6 (L) 06/18/2018     06/18/2018     Lab Results   Component Value Date    INR 1.1 06/26/2018    INR 1.0 06/15/2018    INR 0.9 04/19/2018     BNP   Date Value Ref Range Status   06/15/2018 51 0 - 99 pg/mL Final     Comment:     Values of less than 100 pg/ml are consistent with non-CHF populations.   04/19/2018 89 0 - 99 pg/mL Final     Comment:     Values of less than 100 pg/ml are consistent with non-CHF populations.   01/12/2018 44 0 - 99 pg/mL Final     Comment:     Values of less than 100 pg/ml are consistent with non-CHF populations.     No results found for: LDH  No results found for this or any previous visit.  No results found for this or any previous visit.    Assessment:      1. Cardiomyopathy, ischemic    2. Chronic combined systolic and diastolic heart failure    3. Coronary artery disease involving native coronary artery of native heart without angina pectoris    4. AICD (automatic cardioverter/defibrillator) present    5. Essential hypertension    6. LBBB (left bundle branch block)    7. CKD (chronic kidney disease), stage III    8. History of DVT (deep vein thrombosis)    9. Hx pulmonary embolism 2007 (estimate)    10. Anemia of chronic disease    11. Severe obesity (BMI 35.0-39.9) with comorbidity        Plan:    LBB and a great deal of dyssyncrony CRT ?  66 y/o male with ICMP, HFrEF stage D with NYHA class II symptoms  (cardiomems)  Continue current HF regimen  Recommend 2 gram sodium restriction and 1500cc fluid restriction.  Encourage physical activity with graded exercise program.  Requested patient to weigh themselves daily, and to notify us if their weight increases by more than 3 lbs in 1 day or 5 lbs in 1 week.      RTC 2 months   rBeezy Gongora MD

## 2018-06-26 NOTE — LETTER
June 26, 2018        Elvin Simms Jr.  [address]  [place] [address]  Phone: [phone]  Fax: [phone]             [facility]  [address]  [facility] [address]  Phone: [phone]   Patient: Audrey Oneil Jr.   MR Number: [id number]   YOB: 1950   Date of Visit: 6/26/2018       Dear Dr. Elvin Simms Jr.    Thank you for referring Audrey Oneil to me for evaluation. Attached you will find relevant portions of my assessment and plan of care.    If you have questions, please do not hesitate to call me. I look forward to following Audrey Oneil along with you.    Sincerely,    Breezy Gongora MD    Enclosure    If you would like to receive this communication electronically, please contact [email or web address] or [phone] to request [employer] access.    [employer] is a tool which provides read-only access to select patient information with whom you have a relationship. Its easy to use and provides real time access to review your patients record including encounter summaries, notes, results, and demographic information.    If you feel you have received this communication in error or would no longer like to receive these types of communications, please e-mail [email or web address]

## 2018-06-26 NOTE — TELEPHONE ENCOUNTER
LVM for pt to call back the office, pt can schedule with any who has an appt. PCP does not have any hos f/u slots for tomorrow.

## 2018-06-26 NOTE — TELEPHONE ENCOUNTER
----- Message from Kenyatta Cuellar sent at 6/25/2018  4:40 PM CDT -----  Contact: self/[phone]  Pt is calling to speak with someone in the office in regards to getting a call back about an apt on tomorrow.  Pt states that he was in the emergency room this morning and needs to see if he can see someone tomorrow. Please advise.      Thanks

## 2018-06-26 NOTE — PROGRESS NOTES
Reviewed today's labs with Dr Gongora. Creat 1.9    No new orders given as per MD, pt was told to stop lisinopril.

## 2018-07-15 ENCOUNTER — HOSPITAL ENCOUNTER (EMERGENCY)
Facility: HOSPITAL | Age: 68
Discharge: HOME | End: 2018-07-15
Attending: EMERGENCY MEDICINE
Payer: MEDICARE

## 2018-07-15 VITALS
WEIGHT: 220 LBS | RESPIRATION RATE: 18 BRPM | TEMPERATURE: 98 F | BODY MASS INDEX: 34.53 KG/M2 | OXYGEN SATURATION: 96 % | SYSTOLIC BLOOD PRESSURE: 140 MMHG | DIASTOLIC BLOOD PRESSURE: 60 MMHG | HEIGHT: 67 IN | HEART RATE: 84 BPM

## 2018-07-15 DIAGNOSIS — M10.9 GOUT, UNSPECIFIED CAUSE, UNSPECIFIED CHRONICITY, UNSPECIFIED SITE: Primary | ICD-10-CM

## 2018-07-15 DIAGNOSIS — M79.89 RIGHT LEG SWELLING: ICD-10-CM

## 2018-07-15 PROCEDURE — 63600175 PHARM REV CODE 636 W HCPCS: Mod: NTX | Performed by: EMERGENCY MEDICINE

## 2018-07-15 PROCEDURE — 99284 EMERGENCY DEPT VISIT MOD MDM: CPT | Mod: ,,, | Performed by: EMERGENCY MEDICINE

## 2018-07-15 PROCEDURE — 25000003 PHARM REV CODE 250: Mod: NTX | Performed by: EMERGENCY MEDICINE

## 2018-07-15 PROCEDURE — 99284 EMERGENCY DEPT VISIT MOD MDM: CPT | Mod: 25,NTX

## 2018-07-15 RX ORDER — NAPROXEN 500 MG/1
500 TABLET ORAL 2 TIMES DAILY PRN
Qty: 8 TABLET | Refills: 0 | Status: SHIPPED | OUTPATIENT
Start: 2018-07-15 | End: 2018-07-31

## 2018-07-15 RX ORDER — PREDNISONE 20 MG/1
40 TABLET ORAL DAILY
Qty: 4 TABLET | Refills: 0 | Status: SHIPPED | OUTPATIENT
Start: 2018-07-15 | End: 2018-07-17

## 2018-07-15 RX ORDER — HYDROCODONE BITARTRATE AND ACETAMINOPHEN 10; 325 MG/1; MG/1
1 TABLET ORAL
Status: COMPLETED | OUTPATIENT
Start: 2018-07-15 | End: 2018-07-15

## 2018-07-15 RX ORDER — NAPROXEN 500 MG/1
500 TABLET ORAL
Status: COMPLETED | OUTPATIENT
Start: 2018-07-15 | End: 2018-07-15

## 2018-07-15 RX ADMIN — PREDNISONE 50 MG: 20 TABLET ORAL at 05:07

## 2018-07-15 RX ADMIN — HYDROCODONE BITARTRATE AND ACETAMINOPHEN 1 TABLET: 10; 325 TABLET ORAL at 05:07

## 2018-07-15 RX ADMIN — NAPROXEN 500 MG: 500 TABLET ORAL at 05:07

## 2018-07-15 NOTE — ED NOTES
Patient complaints of right knee, ankle, and hip pain for the last four days. Patient has a known hx of gout flair ups.  States he has been taking his medication as prescribed by PCP.  Right leg is noticeably larger than the left leg and swollen.  Patient is in no other apparent acute distress; just has a significant amount of pain in the night leg. Patient's moaning and swearing can be heard from the nursing station.  Denies any CP/SOB/N/V/D at this time.

## 2018-07-15 NOTE — ED NOTES
Patient presented D/C instructions at the bedside and educated on the proper use of medications prescribed.  Patient refused exiting VS.  Patient acknowledged D/C instructions.  Patient has a amrik to come and pick him up in the waiting room.  Patient has no further questions or concerns in regards to treatment or care received today. Wheelchair exit to the waiting room.

## 2018-07-15 NOTE — ED PROVIDER NOTES
Encounter Date: 7/15/2018    SCRIBE #1 NOTE: I, Swapnil Cardoza, am scribing for, and in the presence of,  Tony Fuentes MD. I have scribed the following portions of the note - Other sections scribed: HPI, ROS, PE, MDM.       History     Chief Complaint   Patient presents with    Gout     Gout x4 days with a hx. Right leg/knee/ankle/hip pain and swelling.      Time seen by provider: 5:10 AM    This is a 68 y.o. male with history of PE, CAD, CHF, CKD, gout, who presents with complaint of right leg pain ongoing for 4 days. Patient states that the pain is severe diffusely across his right leg. He also reports pain to his buttocks and right groin. Patient describes the pain as soreness, typical to his gout. He reports compliance with his medications. Patient denies fever, skin changes, recent trauma, or any numbness or weakness or history of neuropathic pain. He is concerned that his allopurinol is not working. No trauma.      The history is provided by the patient and medical records.     Review of patient's allergies indicates:   Allergen Reactions    Iodine containing multivitamin Swelling     itching    Keflex [cephalexin] Swelling     eyes    Peaches [peach (prunus persica)] Swelling     eyes    Shellfish containing products Swelling    Fig tree Swelling     itching    Tuberculin spenser test ppd Rash     Past Medical History:   Diagnosis Date    Chronic anticoagulation 5/5/2016    Chronic combined systolic and diastolic heart failure 11/26/2012    EF 10-20% on ECHO 2013    Clotting disorder     Coronary artery disease involving native coronary artery of native heart without angina pectoris 11/26/2012    Cath 10- Stents patent non-obstructive disease Cath 11-12015 non-obstructive disease     Diverticulosis of colon     DVT (deep venous thrombosis), unspecified laterality 11/12/2015    Essential hypertension 11/15/2015    Hyperlipidemia     Hypertensive heart disease with heart failure 5/5/2016    MI  (myocardial infarction) 2009    x's 5    Nicotine abuse     Obesity 11/26/2012    Pulmonary embolism 2011    Renal disorder     LAVERNE    Stented coronary artery 11/26/2012    LAD stent placed 10/17/2007      Past Surgical History:   Procedure Laterality Date    APPENDECTOMY      BACK SURGERY      CARDIAC DEFIBRILLATOR PLACEMENT      CARDIAC SURGERY  2007    stent    CARPAL TUNNEL RELEASE Right 04/2018    r knee scope      SPINE SURGERY      TONSILLECTOMY      TRIGGER FINGER RELEASE Right 04/2018    thumb     Family History   Problem Relation Age of Onset    Cancer Mother         colon cancer    Heart disease Mother     Diabetes Mother     Heart disease Father     Stroke Father     Colon polyps Father     Cancer Paternal Grandmother         leukemia    No Known Problems Sister     No Known Problems Daughter     No Known Problems Son     No Known Problems Sister     No Known Problems Son      Social History   Substance Use Topics    Smoking status: Former Smoker     Packs/day: 1.00     Years: 45.00     Types: Cigarettes     Quit date: 12/14/2005    Smokeless tobacco: Never Used      Comment: 1-1.5 ppd every day.    Alcohol use No     Review of Systems   Constitutional: Negative for fever.   HENT: Negative for sore throat.    Respiratory: Negative for shortness of breath.    Cardiovascular: Positive for leg swelling. Negative for chest pain.   Gastrointestinal: Negative for nausea.   Genitourinary: Negative for dysuria.   Musculoskeletal: Positive for arthralgias and myalgias. Negative for gait problem.   Skin: Negative for rash.   Neurological: Negative for weakness.   Hematological: Does not bruise/bleed easily.       Physical Exam     Initial Vitals [07/15/18 0242]   BP Pulse Resp Temp SpO2   (!) 140/60 84 18 98.4 °F (36.9 °C) 96 %      MAP       --         Physical Exam    Nursing note and vitals reviewed.  Constitutional: He appears well-developed and well-nourished. He is not  diaphoretic. No distress.   Mild distress secondary to pain. Speaking in comlpete sentences on room air.    HENT:   Head: Normocephalic and atraumatic.   Eyes: Conjunctivae and EOM are normal.   Neck: Normal range of motion. Neck supple. No JVD present.   Cardiovascular: Normal rate, regular rhythm, normal heart sounds and intact distal pulses. Exam reveals no gallop and no friction rub.    No murmur heard.  Pulmonary/Chest: Breath sounds normal. No respiratory distress. He has no wheezes. He has no rhonchi. He has no rales. He exhibits no tenderness.   Abdominal: Soft. Bowel sounds are normal. He exhibits no distension and no mass. There is no tenderness. There is no rebound and no guarding.   Musculoskeletal: Normal range of motion. He exhibits tenderness.        Right knee: He exhibits swelling. Tenderness found.        Right foot: There is tenderness and swelling.   Right leg swelling, most pronounced in knee and foot. No skin changes. No calf asymmetry or tenderness. No gail rigidity to right hip or knee.    Lymphadenopathy:     He has no cervical adenopathy.   Neurological: He is alert and oriented to person, place, and time. He has normal strength. No sensory deficit.   Skin: Skin is warm and dry. Capillary refill takes less than 2 seconds.   Psychiatric: He has a normal mood and affect.         ED Course   Procedures  Labs Reviewed - No data to display       Imaging Results    None          Medical Decision Making:   History:   Old Medical Records: I decided to obtain old medical records.  Initial Assessment:   68 y.o. male with history of PE, CAD, CHF, CKD, gout presents with 4 days of right leg pain. No trauma to suggest fracture. No fever or joint rigidity to suggest septic joint. Overall concerning for gout vs DVT. Will administer analgesics and steroids. Will obtain US to rule out DVT. Patient is neurovascularly intact without evidence of compartment syndrome.     Reassessment:  Ultrasound negative for  DVT.  Patient reports pain improved status post medications.  He is able to ambulate and range RLE without difficulty at this time. He reports he has adequate [place] at home.  Will discharge on short course of steroids and NSAIDs.  Provided with extensive return precautions. Also clarified with pt my concern for his chronic kidney disease and diabetes and the issue with taking too many of the prescribed medications.              Scribe Attestation:   Scribe #1: I performed the above scribed service and the documentation accurately describes the services I performed. I attest to the accuracy of the note.    Attending Attestation:           Physician Attestation for Scribe:      Comments: I, Dr. Tony Fuentes, personally performed the services described in this documentation. All medical record entries made by the scribe were at my direction and in my presence.  I have reviewed the chart and agree that the record reflects my personal performance and is accurate and complete. Tony Fuentes MD.  8:24 AM 07/15/2018                 Clinical Impression:   The primary encounter diagnosis was Gout, unspecified cause, unspecified chronicity, unspecified site. A diagnosis of Right leg swelling was also pertinent to this visit.                             Tony Fuentes MD  07/15/18 0824

## 2018-07-24 ENCOUNTER — CLINICAL SUPPORT (OUTPATIENT)
Dept: ELECTROPHYSIOLOGY | Facility: CLINIC | Age: 68
End: 2018-07-24
Attending: INTERNAL MEDICINE
Payer: MEDICARE

## 2018-07-24 DIAGNOSIS — I42.9 CARDIOMYOPATHY: ICD-10-CM

## 2018-07-24 DIAGNOSIS — Z95.810 IMPLANTABLE CARDIOVERTER-DEFIBRILLATOR (ICD) IN SITU: ICD-10-CM

## 2018-07-24 PROCEDURE — 93295 DEV INTERROG REMOTE 1/2/MLT: CPT | Mod: NTX,S$GLB,, | Performed by: INTERNAL MEDICINE

## 2018-07-24 PROCEDURE — 93296 REM INTERROG EVL PM/IDS: CPT | Mod: NTX,S$GLB,, | Performed by: INTERNAL MEDICINE

## 2018-07-27 ENCOUNTER — TELEPHONE (OUTPATIENT)
Dept: ELECTROPHYSIOLOGY | Facility: CLINIC | Age: 68
End: 2018-07-27

## 2018-07-27 NOTE — TELEPHONE ENCOUNTER
Spoke with patient. Patient reports that he would like for  to talk to his transplant MD- prior to patietn proceeding with ICD upgrade. Fatimah has an appointment with  on 8-16-18. Will forward message to .   Jessie TALAMANTES CCM

## 2018-07-29 ENCOUNTER — HOSPITAL ENCOUNTER (EMERGENCY)
Facility: HOSPITAL | Age: 68
Discharge: HOME | End: 2018-07-29
Attending: EMERGENCY MEDICINE
Payer: MEDICARE

## 2018-07-29 VITALS
TEMPERATURE: 98 F | BODY MASS INDEX: 34.71 KG/M2 | WEIGHT: 221.13 LBS | SYSTOLIC BLOOD PRESSURE: 145 MMHG | HEART RATE: 73 BPM | RESPIRATION RATE: 18 BRPM | DIASTOLIC BLOOD PRESSURE: 64 MMHG | OXYGEN SATURATION: 96 % | HEIGHT: 67 IN
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
## 2018-07-29 DIAGNOSIS — M25.531 RIGHT WRIST PAIN: ICD-10-CM
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
## 2018-07-29 DIAGNOSIS — S63.501A SPRAIN OF RIGHT WRIST, INITIAL ENCOUNTER: Primary | ICD-10-CM

## 2018-07-29 PROCEDURE — 29125 APPL SHORT ARM SPLINT STATIC: CPT | Mod: RT,NTX

## 2018-07-29 PROCEDURE — 99283 EMERGENCY DEPT VISIT LOW MDM: CPT | Mod: 25,NTX

## 2018-07-29 PROCEDURE — 25000003 PHARM REV CODE 250: Mod: NTX | Performed by: EMERGENCY MEDICINE

## 2018-07-29 PROCEDURE — 99283 EMERGENCY DEPT VISIT LOW MDM: CPT | Mod: NTX,,, | Performed by: EMERGENCY MEDICINE

## 2018-07-29 RX ORDER — TRAMADOL HYDROCHLORIDE 50 MG/1
50 TABLET ORAL EVERY 6 HOURS PRN
Qty: 9 TABLET | Refills: 0 | Status: SHIPPED | OUTPATIENT
Start: 2018-07-29 | End: 2018-08-03

## 2018-07-29 RX ORDER — HYDROCODONE BITARTRATE AND ACETAMINOPHEN 5; 325 MG/1; MG/1
1 TABLET ORAL
Status: COMPLETED | OUTPATIENT
Start: 2018-07-29 | End: 2018-07-29

## 2018-07-29 RX ADMIN — HYDROCODONE BITARTRATE AND ACETAMINOPHEN 1 TABLET: 5; 325 TABLET ORAL at 05:07

## 2018-07-29 NOTE — ED NOTES
Pt's first and last name and birthday confirmed.   LOC: The patient is awake, alert and aware of environment with a flat affect, the patient is oriented x 3 and speaking appropriately.  APPEARANCE: Patient in no acute distress, patient is clean and well groomed.  SKIN: The skin is warm and dry, patient has normal skin turgor and moist mucus membranes, skin intact, no breakdown or brusing noted.  MUSKULOSKELETAL: Patient moving all extremities well, no obvious swelling or deformities noted. The anterior palm of the right hand and posterior side of right hand is tender to palpation. Sensory and motor intact. Right radial pulse is strong.   RESPIRATORY: Airway is open and patent, respirations are spontaneous, patient has a normal effort and rate.  NEURO: No neuro deficits, no drift noted, no facial droop noted. Speech is clear.

## 2018-07-29 NOTE — ED TRIAGE NOTES
Pt landed on his right hand after a slip and fall to his bed 3 days ago. Pt now having severe pain and swelling in the right wrist.

## 2018-07-29 NOTE — ED PROVIDER NOTES
Encounter Date: 7/29/2018    SCRIBE #1 NOTE: I, Nancie Pratibha, am scribing for, and in the presence of, Dr. Witt.       History     Chief Complaint   Patient presents with    Wrist Injury     gout in ankle for 3-4 weeks , fell and hurt my L wrist 3d ago     Time patient was seen by the provider: 5:28 PM      The patient is a 68 y.o. male with co-morbidities including hx of gout in his ankle (seen in ED about 12 days ago) who presents to the ED with a complaint of a right wrist injury. States he has gout and his foot is still swollen, but the pain is gone. States 2 days ago he was walking with crutches and he fell and his wrist and shoulder were stuck in the crutch. He reports pain to the wrist and cannot move it or extend his fingers. Never had gout flare-ups in his wrist. States his right shoulder also hurts, but thinks it is just a muscle pull. States he took a norco.       The history is provided by the patient and medical records.     Review of patient's allergies indicates:   Allergen Reactions    Iodine containing multivitamin Swelling     itching    Keflex [cephalexin] Swelling     eyes    Peaches [peach (prunus persica)] Swelling     eyes    Shellfish containing products Swelling    Fig tree Swelling     itching    Tuberculin spenser test ppd Rash     Past Medical History:   Diagnosis Date    Chronic anticoagulation 5/5/2016    Chronic combined systolic and diastolic heart failure 11/26/2012    EF 10-20% on ECHO 2013    Clotting disorder     Coronary artery disease involving native coronary artery of native heart without angina pectoris 11/26/2012    Cath 10- Stents patent non-obstructive disease Cath 11-12015 non-obstructive disease     Diverticulosis of colon     DVT (deep venous thrombosis), unspecified laterality 11/12/2015    Essential hypertension 11/15/2015    Hyperlipidemia     Hypertensive heart disease with heart failure 5/5/2016    MI (myocardial infarction) 2009    x's 5     Nicotine abuse     Obesity 11/26/2012    Pulmonary embolism 2011    Renal disorder     LAVERNE    Stented coronary artery 11/26/2012    LAD stent placed 10/17/2007      Past Surgical History:   Procedure Laterality Date    APPENDECTOMY      BACK SURGERY      CARDIAC DEFIBRILLATOR PLACEMENT      CARDIAC SURGERY  2007    stent    CARPAL TUNNEL RELEASE Right 04/2018    r knee scope      SPINE SURGERY      TONSILLECTOMY      TRIGGER FINGER RELEASE Right 04/2018    thumb     Family History   Problem Relation Age of Onset    Cancer Mother         colon cancer    Heart disease Mother     Diabetes Mother     Heart disease Father     Stroke Father     Colon polyps Father     Cancer Paternal Grandmother         leukemia    No Known Problems Sister     No Known Problems Daughter     No Known Problems Son     No Known Problems Sister     No Known Problems Son      Social History   Substance Use Topics    Smoking status: Former Smoker     Packs/day: 1.00     Years: 45.00     Types: Cigarettes     Quit date: 12/14/2005    Smokeless tobacco: Never Used      Comment: 1-1.5 ppd every day.    Alcohol use No     Review of Systems   Constitutional: Negative.    HENT: Negative.    Respiratory: Negative.    Cardiovascular: Negative.    Gastrointestinal: Negative.    Genitourinary: Negative.    Musculoskeletal:        Right shoulder and wrist pain secondary to mechanical fall injury   Skin: Negative.  Negative for wound.   Neurological: Negative.    Psychiatric/Behavioral: Negative.    All other systems reviewed and are negative.      Physical Exam     Initial Vitals [07/29/18 1707]   BP Pulse Resp Temp SpO2   (!) 145/64 73 18 98 °F (36.7 °C) 96 %      MAP       --         Physical Exam    Vitals reviewed.  Constitutional: He appears well-developed and well-nourished.   68 year old  male mild discomfort noted   HENT:   Head: Normocephalic and atraumatic.   Musculoskeletal:   mild tenderness to right  anterior shoulder with no impaired ROM. Tenderness to right wrist with limited ROM secondary to pain   Neurological: He is alert and oriented to person, place, and time.   Skin: Skin is warm and dry.   Right hand's capillary refill < 2 seconds to distal digits          ED Course   Procedures       Imaging Results          X-Ray Wrist Complete Right (Final result)  Result time 07/29/18 18:20:40    Final result by Gilbert Arteaga MD (07/29/18 18:20:40)                 Impression:      1. Nonspecific edema about the dorsal aspect of the hand and wrist as well as about the elbow in the region of the olecranon, no convincing acute displaced fracture or dislocation.  Please see above.      Electronically signed by: Gilbert Arteaga MD  Date:    07/29/2018  Time:    18:20             Narrative:    EXAMINATION:  XR WRIST COMPLETE 3 VIEWS RIGHT; XR FOREARM RIGHT    CLINICAL HISTORY:  Pain in right wrist; Unspecified fall, initial encounter    TECHNIQUE:  PA, lateral, and oblique views of the right wrist were performed.  Two views right forearm.    COMPARISON:  None    FINDINGS:  Four views right wrist, two views right forearm.    Degenerative changes are noted of the wrist.  No convincing acute displaced fracture or dislocation of the wrist.  There is diffuse edema about the wrist, particularly about the dorsal aspect.  There is edema about the olecranon.  Degenerative changes noted versus remote posttraumatic changes of the ulnar styloid.  Degenerative changes are noted at the triceps insertion.                               X-Ray Forearm Right (Final result)  Result time 07/29/18 18:20:40    Final result by Gilbert Arteaga MD (07/29/18 18:20:40)                 Impression:      1. Nonspecific edema about the dorsal aspect of the hand and wrist as well as about the elbow in the region of the olecranon, no convincing acute displaced fracture or dislocation.  Please see above.      Electronically signed by: Gilbert  MD Jenni  Date:    07/29/2018  Time:    18:20             Narrative:    EXAMINATION:  XR WRIST COMPLETE 3 VIEWS RIGHT; XR FOREARM RIGHT    CLINICAL HISTORY:  Pain in right wrist; Unspecified fall, initial encounter    TECHNIQUE:  PA, lateral, and oblique views of the right wrist were performed.  Two views right forearm.    COMPARISON:  None    FINDINGS:  Four views right wrist, two views right forearm.    Degenerative changes are noted of the wrist.  No convincing acute displaced fracture or dislocation of the wrist.  There is diffuse edema about the wrist, particularly about the dorsal aspect.  There is edema about the olecranon.  Degenerative changes noted versus remote posttraumatic changes of the ulnar styloid.  Degenerative changes are noted at the triceps insertion.                              X-Rays:   Independently Interpreted Readings:   Other Readings:  Forearm x-ray: no evidence of bony injury noted  Right Wrist x-ray: no fracture of dislocation noted    Medical Decision Making:   History:   Old Medical Records: I decided to obtain old medical records.  Differential Diagnosis:   Right wrist fracture, right wrist dislocation, right wrist sprain, right forearm injury, fall at home  Independently Interpreted Test(s):   I have ordered and independently interpreted X-rays - see prior notes.  Clinical Tests:   Radiological Study: Ordered and Reviewed            Scribe Attestation:   Scribe #1: I performed the above scribed service and the documentation accurately describes the services I performed. I attest to the accuracy of the note.    Attending Attestation:             Attending ED Notes:   Plain films of the right wrist and forearm did not reveal any evidence of acute bony injury. A Velcro cock-up splint has been applied to the affected extremity.  A brief course of tramadol 50 mg has been provided for pain control.  I have recommended the patient follow up as an outpatient with Hand surgery with  whom he has previously been treated, and return to the ED as he of urgent concerns for             Clinical Impression:   The primary encounter diagnosis was Sprain of right wrist, initial encounter. Diagnoses of Right wrist pain and Fall at home, initial encounter were also pertinent to this visit.      Disposition:   Disposition: Discharged  Condition: Stable                        Darius Witt MD  07/29/18 2274

## 2018-07-30 NOTE — PROGRESS NOTES
INTERNAL MEDICINE ESTABLISHED PATIENT VISIT NOTE    Subjective:     Chief Complaint: Hospital Follow Up  c/o R wrist pain and swelling     Patient ID: Audrey Oneil Jr. is a 68 y.o. male with CAD c HF (hx STEMI s/p PCI LAD 2007, most recent echo c sys and diastolic dysfunction c EF 10-15%, s/p AICD c hx VT, recent RHC c normal R and L sided filling pressures, low cardiac output/index, normal pulm pressures) followed by Dr. Marshall/Ricky, HTN, CKD3, obesity c BMI 37, HLD, hx DVT and PE in 2007 (prev completed course of Coumadinn x 1 year per Cards note), GERD, chronic low back pain, anemia, last seen by me in Jan, here today for ER f/u.    Has had mult ED visits since last appt in Jan for several complaints including:    - H/a: CTA overall unremarkable c no acute findings but extensive dental disease  - MM spasms c neck pain  - Toe pain presumed 2/2 minor trauma  - dressing change following carpal tunnel surgery  - bruising around defibrillator (CXR unremarkable)  - L axillary folliculitis  - hemoptysis (admitted that day on 6/15-6/18 for ARF resolved c IVFs and spironolactone stopped but lasix continued, hemoptysis resolved c normal h/h, neg u/s for DVT, neg CTA for PE), hyperkalemia (Lisinopril held until outpt f/u), and abnormal CT (increased liver heterogeneity possibly from chronic amio toxicity but deferred to Cards)  - neck pain  - neck pain c h/a (CT head: thin extra-axial hypodense collection overlying the right cerebral convexity which is similar to prior suggestive for subdural hygroma versus chronic hematoma.  There is slight mass effect on the right cerebral hemisphere with approximate 4 mm of leftward midline shift with leftward bowing of the septum pellucidum similar to prior.  Punctate hypodensity within the left caudate suggestive for prominent perivascular space versus remote lacunar type infarcts unchanged.)  - gout on 7/15  -wrist injury, R on 7/29 and again last night    Says he was using crutches  for his gout 2 weeks ago and slipped and his R hand went through the crutches and hyperextended his wrist and his fingers when hitting it, trying to catch his fall into the footboard of the bed.    Had some pain and swelling and went to the ED on 7/29 where he had xrays of the wrist and forearm, no fx and went back again last night where he was given pain meds and arm was placed in a sling.  Still c swelling and pain.      Past Medical History:  Past Medical History:   Diagnosis Date    Chronic anticoagulation 5/5/2016    Chronic combined systolic and diastolic heart failure 11/26/2012    EF 10-20% on ECHO 2013    Clotting disorder     Coronary artery disease involving native coronary artery of native heart without angina pectoris 11/26/2012    Cath 10- Stents patent non-obstructive disease Cath 11-12015 non-obstructive disease     Diverticulosis of colon     DVT (deep venous thrombosis), unspecified laterality 11/12/2015    Essential hypertension 11/15/2015    Hyperlipidemia     Hypertensive heart disease with heart failure 5/5/2016    MI (myocardial infarction) 2009    x's 5    Nicotine abuse     Obesity 11/26/2012    Pulmonary embolism 2011    Renal disorder     LAVERNE    Stented coronary artery 11/26/2012    LAD stent placed 10/17/2007        Home Medications:  Prior to Admission medications    Medication Sig Start Date End Date Taking? Authorizing Provider   allopurinol (ZYLOPRIM) 100 MG tablet Take 1 tablet (100 mg total) by mouth once daily. 1/10/18   January Khan MD   amiodarone (PACERONE) 200 MG Tab TAKE 1 AND 1/2 TABLETS(300 MG) BY MOUTH EVERY DAY 6/18/18   Edison Marshall MD   aspirin (ECOTRIN) 325 MG EC tablet Take 325 mg by mouth once daily.    Historical Provider, MD   atorvastatin (LIPITOR) 80 MG tablet TAKE 1 TABLET(80 MG) BY MOUTH EVERY DAY 6/26/18   Breezy Gongora MD   clopidogrel (PLAVIX) 75 mg tablet Take 1 tablet (75 mg total) by mouth once daily. 6/26/18   Breezy Gongora  MD   docusate sodium (COLACE) 50 MG capsule Take 1 capsule (50 mg total) by mouth 2 (two) times daily. 4/6/18   CANDIDO Novoa   furosemide (LASIX) 40 MG tablet TAKE 1 TABLET(40 MG) BY MOUTH EVERY DAY 5/10/18   Edison Marshall MD   metoprolol succinate (TOPROL-XL) 50 MG 24 hr tablet TAKE 1 TABLET BY MOUTH EVERY DAY 5/22/17   Migue Henry Jr., MD   naproxen (NAPROSYN) 500 MG tablet Take 1 tablet (500 mg total) by mouth 2 (two) times daily as needed (pain). 7/15/18   Tony Fuentes MD   traMADol (ULTRAM) 50 mg tablet Take 1 tablet (50 mg total) by mouth every 6 (six) hours as needed. 7/29/18 8/3/18  Darius Witt MD       Allergies:  Review of patient's allergies indicates:   Allergen Reactions    Iodine containing multivitamin Swelling     itching    Keflex [cephalexin] Swelling     eyes    Peaches [peach (prunus persica)] Swelling     eyes    Shellfish containing products Swelling    Fig tree Swelling     itching    Tuberculin spenser test ppd Rash       Social History:  Social History   Substance Use Topics    Smoking status: Former Smoker     Packs/day: 1.00     Years: 45.00     Types: Cigarettes     Quit date: 12/14/2005    Smokeless tobacco: Never Used      Comment: 1-1.5 ppd every day.    Alcohol use No        Review of Systems   Constitutional: Negative for chills, fatigue and fever.   Respiratory: Negative for cough, chest tightness and shortness of breath.    Cardiovascular: Negative for chest pain.   Gastrointestinal: Negative for abdominal pain and blood in stool.   Genitourinary: Negative for dysuria and frequency.   Musculoskeletal: Positive for arthralgias and joint swelling.         Health Maintenance:   Immunizations:   Influenza vacc 10/2017  TDap is up to date 6/2016  Pneumovax is up to date. 2014, Prevnar 2017  Zostavax 10/2017     Cancer Screening:  Colonoscopy: is not up to date. Ordered prev by other MD but says he was awaiting cards clearance prior to procedure since  "on asa and plavix; agreed to FIT testing, ordered in Jan, advised to turn in  PSA 7/2017 wnl    Objective:   /74 (BP Location: Left arm, Patient Position: Sitting, BP Method: Large (Manual))   Pulse 67   Ht 5' 7" (1.702 m)   Wt 101 kg (222 lb 10.6 oz)   SpO2 97%   BMI 34.87 kg/m²        General: AAO x3, no apparent distress  CV: RRR, no m/r/g  Pulm: Lungs CTAB, no crackles, no wheezes  Abd: s/NT/ND +BS  Extremities: R wrist and hand swollen and warm, slight erythema  No LE edema    Labs:     Lab Results   Component Value Date    WBC 6.20 06/18/2018    HGB 12.6 (L) 06/18/2018    HCT 38.6 (L) 06/18/2018    MCV 96 06/18/2018     06/18/2018     Lab Results   Component Value Date    IRON 78 07/21/2017    TIBC 333 07/21/2017    FERRITIN 181 07/21/2017     CMP  Sodium   Date Value Ref Range Status   06/26/2018 141 136 - 145 mmol/L Final   06/26/2018 141 136 - 145 mmol/L Final     Potassium   Date Value Ref Range Status   06/26/2018 4.5 3.5 - 5.1 mmol/L Final   06/26/2018 4.5 3.5 - 5.1 mmol/L Final     Chloride   Date Value Ref Range Status   06/26/2018 103 95 - 110 mmol/L Final   06/26/2018 103 95 - 110 mmol/L Final     CO2   Date Value Ref Range Status   06/26/2018 28 23 - 29 mmol/L Final   06/26/2018 28 23 - 29 mmol/L Final     Glucose   Date Value Ref Range Status   06/26/2018 114 (H) 70 - 110 mg/dL Final   06/26/2018 114 (H) 70 - 110 mg/dL Final     BUN, Bld   Date Value Ref Range Status   06/26/2018 50 (H) 8 - 23 mg/dL Final   06/26/2018 50 (H) 8 - 23 mg/dL Final     Creatinine   Date Value Ref Range Status   06/26/2018 1.9 (H) 0.5 - 1.4 mg/dL Final   06/26/2018 1.9 (H) 0.5 - 1.4 mg/dL Final     Calcium   Date Value Ref Range Status   06/26/2018 9.6 8.7 - 10.5 mg/dL Final   06/26/2018 9.6 8.7 - 10.5 mg/dL Final     Total Protein   Date Value Ref Range Status   06/26/2018 7.0 6.0 - 8.4 g/dL Final     Albumin   Date Value Ref Range Status   06/26/2018 3.7 3.5 - 5.2 g/dL Final     Total Bilirubin   Date " Value Ref Range Status   06/26/2018 0.4 0.1 - 1.0 mg/dL Final     Comment:     For infants and newborns, interpretation of results should be based  on gestational age, weight and in agreement with clinical  observations.  Premature Infant recommended reference ranges:  Up to 24 hours.............<8.0 mg/dL  Up to 48 hours............<12.0 mg/dL  3-5 days..................<15.0 mg/dL  6-29 days.................<15.0 mg/dL       Alkaline Phosphatase   Date Value Ref Range Status   06/26/2018 113 55 - 135 U/L Final     AST   Date Value Ref Range Status   06/26/2018 24 10 - 40 U/L Final     ALT   Date Value Ref Range Status   06/26/2018 39 10 - 44 U/L Final     Anion Gap   Date Value Ref Range Status   06/26/2018 10 8 - 16 mmol/L Final   06/26/2018 10 8 - 16 mmol/L Final     eGFR if    Date Value Ref Range Status   06/26/2018 41.5 (A) >60 mL/min/1.73 m^2 Final   06/26/2018 41.5 (A) >60 mL/min/1.73 m^2 Final     eGFR if non    Date Value Ref Range Status   06/26/2018 35.9 (A) >60 mL/min/1.73 m^2 Final     Comment:     Calculation used to obtain the estimated glomerular filtration  rate (eGFR) is the CKD-EPI equation.      06/26/2018 35.9 (A) >60 mL/min/1.73 m^2 Final     Comment:     Calculation used to obtain the estimated glomerular filtration  rate (eGFR) is the CKD-EPI equation.        Lab Results   Component Value Date    LDLCALC 57.6 (L) 07/21/2017     Lab Results   Component Value Date    TSH 0.833 05/04/2018     Lab Results   Component Value Date    URICACID 12.5 (H) 09/19/2017     Lab Results   Component Value Date    HGBA1C 6.0 (H) 07/21/2017         Assessment/Plan     Audrey was seen today for hospital follow up.    Diagnoses and all orders for this visit:    Injury of right wrist, subsequent encounter  Other secondary acute gout of right hand  Suspect swelling c recent fall but also c superimposed gout  Recent xrays s fx  Pt will f/u c his hand surgeon today  Will tx c  prednisone taper, avoiding nsaids due to CKD  Titrate allopurinol once flare resolved  -     Uric acid; Future  -     predniSONE (DELTASONE) 10 MG tablet; Take 4 tabs daily x2 days, then 3 tab qday x2 days, then 2 tab qday x2 days, then 1 tab qday x2 days, then 1/2 tab qday x2 days then stop.    Hypertensive heart disease with heart failure  Essential hypertension  CKD (chronic kidney disease), stage III  BP at goal, last cr close to baseline  Hx hyperkalemia so ACEi and aldactone stopped by other MD per pt hx    Hyperlipidemia LDL goal <70  Needs update labs,   Lab Results   Component Value Date    LDLCALC 57.6 (L) 07/21/2017     Last check at goal, cont lipitor  -     Lipid panel; Future    Coronary artery disease involving native coronary artery of native heart without angina pectoris  Chronic combined systolic and diastolic heart failure  AICD (automatic cardioverter/defibrillator) present  No acute issues, cont f/u c cards    Prediabetes  Needs updated labs  Counseled on diet  -     Hemoglobin A1c; Future    Severe obesity (BMI 35.0-39.9) with comorbidity  Counseled extensively on need for diet changes and daily exercise.   Recommend at least 30 minutes of exercise at least 5 days a week as tolerated.    HM as above  RTC in 1 mo for f/u gout issues.    January Khan MD  Department of Internal Medicine - [facility]  07/31/2018

## 2018-07-31 ENCOUNTER — OFFICE VISIT (OUTPATIENT)
Dept: INTERNAL MEDICINE | Facility: CLINIC | Age: 68
End: 2018-07-31
Payer: MEDICARE

## 2018-07-31 ENCOUNTER — HOSPITAL ENCOUNTER (EMERGENCY)
Facility: HOSPITAL | Age: 68
Discharge: HOME | End: 2018-07-31
Attending: EMERGENCY MEDICINE
Payer: MEDICARE

## 2018-07-31 VITALS
HEART RATE: 84 BPM | OXYGEN SATURATION: 96 % | SYSTOLIC BLOOD PRESSURE: 132 MMHG | BODY MASS INDEX: 35.52 KG/M2 | RESPIRATION RATE: 18 BRPM | WEIGHT: 221 LBS | TEMPERATURE: 99 F | DIASTOLIC BLOOD PRESSURE: 63 MMHG | HEIGHT: 66 IN

## 2018-07-31 VITALS
BODY MASS INDEX: 34.95 KG/M2 | WEIGHT: 222.69 LBS | DIASTOLIC BLOOD PRESSURE: 74 MMHG | HEART RATE: 67 BPM | SYSTOLIC BLOOD PRESSURE: 118 MMHG | HEIGHT: 67 IN | OXYGEN SATURATION: 97 %

## 2018-07-31 DIAGNOSIS — M10.441 OTHER SECONDARY ACUTE GOUT OF RIGHT HAND: ICD-10-CM

## 2018-07-31 DIAGNOSIS — N18.30 CKD (CHRONIC KIDNEY DISEASE), STAGE III: ICD-10-CM

## 2018-07-31 DIAGNOSIS — E78.5 HYPERLIPIDEMIA LDL GOAL <70: ICD-10-CM

## 2018-07-31 DIAGNOSIS — I11.0 HYPERTENSIVE HEART DISEASE WITH HEART FAILURE: ICD-10-CM

## 2018-07-31 DIAGNOSIS — I25.10 CORONARY ARTERY DISEASE INVOLVING NATIVE CORONARY ARTERY OF NATIVE HEART WITHOUT ANGINA PECTORIS: ICD-10-CM

## 2018-07-31 DIAGNOSIS — S63.501D SPRAIN OF RIGHT WRIST, SUBSEQUENT ENCOUNTER: Primary | ICD-10-CM

## 2018-07-31 DIAGNOSIS — Z95.810 AICD (AUTOMATIC CARDIOVERTER/DEFIBRILLATOR) PRESENT: ICD-10-CM

## 2018-07-31 DIAGNOSIS — S69.91XD INJURY OF RIGHT WRIST, SUBSEQUENT ENCOUNTER: Primary | ICD-10-CM

## 2018-07-31 DIAGNOSIS — I10 ESSENTIAL HYPERTENSION: ICD-10-CM

## 2018-07-31 DIAGNOSIS — R73.03 PREDIABETES: ICD-10-CM

## 2018-07-31 DIAGNOSIS — E66.01 SEVERE OBESITY (BMI 35.0-39.9) WITH COMORBIDITY: Chronic | ICD-10-CM

## 2018-07-31 DIAGNOSIS — I50.42 CHRONIC COMBINED SYSTOLIC AND DIASTOLIC HEART FAILURE: Chronic | ICD-10-CM

## 2018-07-31 PROBLEM — R04.2 HEMOPTYSIS: Status: RESOLVED | Noted: 2018-06-15 | Resolved: 2018-07-31

## 2018-07-31 PROCEDURE — 99283 EMERGENCY DEPT VISIT LOW MDM: CPT | Mod: NTX | Performed by: PHYSICIAN ASSISTANT

## 2018-07-31 PROCEDURE — 3074F SYST BP LT 130 MM HG: CPT | Mod: CPTII,S$GLB,, | Performed by: INTERNAL MEDICINE

## 2018-07-31 PROCEDURE — 99999 PR PBB SHADOW E&M-EST. PATIENT-LVL III: CPT | Mod: PBBFAC,,, | Performed by: INTERNAL MEDICINE

## 2018-07-31 PROCEDURE — 25000003 PHARM REV CODE 250: Mod: NTX | Performed by: PHYSICIAN ASSISTANT

## 2018-07-31 PROCEDURE — 3078F DIAST BP <80 MM HG: CPT | Mod: CPTII,S$GLB,, | Performed by: INTERNAL MEDICINE

## 2018-07-31 PROCEDURE — 99214 OFFICE O/P EST MOD 30 MIN: CPT | Mod: S$GLB,,, | Performed by: INTERNAL MEDICINE

## 2018-07-31 PROCEDURE — 99283 EMERGENCY DEPT VISIT LOW MDM: CPT | Mod: NTX,,, | Performed by: PHYSICIAN ASSISTANT

## 2018-07-31 RX ORDER — PREDNISONE 10 MG/1
TABLET ORAL
Qty: 21 TABLET | Refills: 0 | Status: SHIPPED | OUTPATIENT
Start: 2018-07-31 | End: 2018-08-27

## 2018-07-31 RX ORDER — NAPROXEN 500 MG/1
500 TABLET ORAL
Status: COMPLETED | OUTPATIENT
Start: 2018-07-31 | End: 2018-07-31

## 2018-07-31 RX ADMIN — NAPROXEN 500 MG: 500 TABLET ORAL at 01:07

## 2018-07-31 NOTE — ED NOTES
Patient reports right hand swelling. Patient was seen here last night for the same and was told he had a sprain to the hand/wrist. Patient reports he has taken at least 5 norco, approx 4 tylenol, and 1 percocet.     Appearance: Pt awake, alert & oriented to person, place & time. Pt in no acute distress at present time.   Skin: Skin warm, dry & intact. Mucous membranes moist. Skin turgor normal.   Respiratory: Respirations even, non-labored.   Neurologic: Pt moving all extremities without difficulty. Sensation intact.   Peripheral Vascular: All peripheral pulses present.     Left hand swollen and warm. Painful to touch.

## 2018-07-31 NOTE — ED PROVIDER NOTES
Encounter Date: 7/31/2018       History     Chief Complaint   Patient presents with    Hand Swelling     Patient reports right hand swelling. Patient was seen here last night for the same and was told he had a sprain to the hand/wrist. Patient reports he has taken at least 5 norco, multiple tylenol, and toradol     Patient is 68 year old male with PMHX of combined CHF with 20%EF, CAD on Plavix 75 mg, hx of MI, HTN, HLD, hx of PE, diverticulosis, and hx of gout. He presents to the ED for wrist pain. Patient was recently [place] [facility] [place] similar complaint yesterday. Patient reports having right wrist pain after mechanical fall within household for three days. Describes pain as constant and throbbing. Rates pain 9/10. Denies relief of pain with Norco and wrist splint. Denies increased swelling. Reports pain remains unchanged from onset. He denies fever,chills, nausea, vomiting, sob, chest pain, abd pain, dysuria, diarrhea, or constipation. He is a former smoker and denies alcohol use.           Review of patient's allergies indicates:   Allergen Reactions    Iodine containing multivitamin Swelling     itching    Keflex [cephalexin] Swelling     eyes    Peaches [peach (prunus persica)] Swelling     eyes    Shellfish containing products Swelling    Fig tree Swelling     itching    Tuberculin spenser test ppd Rash     Past Medical History:   Diagnosis Date    Chronic anticoagulation 5/5/2016    Chronic combined systolic and diastolic heart failure 11/26/2012    EF 10-20% on ECHO 2013    Clotting disorder     Coronary artery disease involving native coronary artery of native heart without angina pectoris 11/26/2012    Cath 10- Stents patent non-obstructive disease Cath 11-12015 non-obstructive disease     Diverticulosis of colon     DVT (deep venous thrombosis), unspecified laterality 11/12/2015    Essential hypertension 11/15/2015    Hyperlipidemia     Hypertensive heart disease with heart  failure 5/5/2016    MI (myocardial infarction) 2009    x's 5    Nicotine abuse     Obesity 11/26/2012    Pulmonary embolism 2011    Renal disorder     LAVERNE    Stented coronary artery 11/26/2012    LAD stent placed 10/17/2007      Past Surgical History:   Procedure Laterality Date    APPENDECTOMY      BACK SURGERY      CARDIAC DEFIBRILLATOR PLACEMENT      CARDIAC SURGERY  2007    stent    CARPAL TUNNEL RELEASE Right 04/2018    r knee scope      SPINE SURGERY      TONSILLECTOMY      TRIGGER FINGER RELEASE Right 04/2018    thumb     Family History   Problem Relation Age of Onset    Cancer Mother         colon cancer    Heart disease Mother     Diabetes Mother     Heart disease Father     Stroke Father     Colon polyps Father     Cancer Paternal Grandmother         leukemia    No Known Problems Sister     No Known Problems Daughter     No Known Problems Son     No Known Problems Sister     No Known Problems Son      Social History   Substance Use Topics    Smoking status: Former Smoker     Packs/day: 1.00     Years: 45.00     Types: Cigarettes     Quit date: 12/14/2005    Smokeless tobacco: Never Used      Comment: 1-1.5 ppd every day.    Alcohol use No     Review of Systems   Constitutional: Negative for fever.   HENT: Negative for sore throat.    Respiratory: Negative for shortness of breath.    Cardiovascular: Negative for chest pain.   Gastrointestinal: Negative for abdominal pain, nausea and vomiting.   Genitourinary: Negative for dysuria.   Musculoskeletal: Positive for arthralgias (right wrist pain). Negative for back pain.   Skin: Negative for rash.   Neurological: Negative for weakness.   Hematological: Does not bruise/bleed easily.       Physical Exam     Initial Vitals [07/31/18 0055]   BP Pulse Resp Temp SpO2   (!) 121/54 88 18 98.8 °F (37.1 °C) (!) 94 %      MAP       --         Physical Exam    Vitals reviewed.  Constitutional: He appears well-developed and well-nourished.    Patient resting uncomfortably secondary to pain in NAD on RA.    HENT:   Head: Normocephalic and atraumatic.   Nose: Nose normal.   Eyes: Conjunctivae and EOM are normal. Pupils are equal, round, and reactive to light.   Neck: Normal range of motion.   Cardiovascular: Normal rate and regular rhythm. Exam reveals no friction rub.    No murmur heard.  Pulses:       Radial pulses are 2+ on the right side, and 2+ on the left side.   Pulmonary/Chest: Breath sounds normal. No respiratory distress. He has no wheezes. He has no rales.   Abdominal: Soft. Bowel sounds are normal. He exhibits no distension. There is no tenderness.   Musculoskeletal:        Right hand: He exhibits decreased range of motion (secondary to pain. patient uncooperative with exam. ), tenderness and swelling. He exhibits no deformity. Normal sensation noted.   Neurological: He is alert and oriented to person, place, and time. He has normal strength. No sensory deficit.   Skin: Skin is warm and dry. No erythema.   Psychiatric: He has a normal mood and affect. Thought content normal.         ED Course   Procedures  Labs Reviewed - No data to display              APC / Resident Notes:   Patient is 68 year old male with PMHX of combined CHF with 20%EF, CAD on Plavix 75 mg, hx of MI, HTN, HLD, hx of PE, diverticulosis, and hx of gout. He presents to the ED for wrist pain.    Will order pain medication and provide comfort measures with arm sling. Will continue to monitor.     Differential diagnoses include, but are not limited to: wrist contusion, carpal tunnel syndrome, gout, or lymphedema.     I have discussed emergency department findings, and plan with the patient. Will discharge home with arm sling and F/U with hand orthopedics and PCP. Patient verbalizes understanding of plan and agrees. Return precautions given.     I have discussed and reviewed with my supervising physician.        Clinical Impression:   The encounter diagnosis was Sprain of right  wrist, subsequent encounter.      Disposition:   Disposition: Discharged  Condition: Stable                        Mary Vanegas PA-C  07/31/18 0409

## 2018-07-31 NOTE — DISCHARGE INSTRUCTIONS
Future Appointments  Date Time Provider Department Center   7/31/2018 10:00 AM January Khan MD [facility] [address] [facility]   8/16/2018 8:20 AM Teofilo Pal MD [facility] [address]   8/27/2018 4:30 PM Edison Marshall MD [facility] [address]     Our goal in the emergency department is to always give you outstanding care and exceptional service. You may receive a survey by mail or e-mail in the next week regarding your experience in our ED. We would greatly appreciate your completing and returning the survey. Your feedback provides us with a way to recognize our staff who give very good care and it helps us learn how to improve when your experience was below our aspiration of excellence.

## 2018-08-01 ENCOUNTER — LAB VISIT (OUTPATIENT)
Dept: LAB | Facility: HOSPITAL | Age: 68
End: 2018-08-01
Attending: INTERNAL MEDICINE
Payer: MEDICARE

## 2018-08-01 DIAGNOSIS — M10.441 OTHER SECONDARY ACUTE GOUT OF RIGHT HAND: ICD-10-CM

## 2018-08-01 DIAGNOSIS — Z95.810 AICD (AUTOMATIC CARDIOVERTER/DEFIBRILLATOR) PRESENT: Primary | ICD-10-CM

## 2018-08-01 DIAGNOSIS — I25.5 ISCHEMIC CARDIOMYOPATHY: ICD-10-CM

## 2018-08-01 DIAGNOSIS — E78.5 HYPERLIPIDEMIA LDL GOAL <70: ICD-10-CM

## 2018-08-01 DIAGNOSIS — R73.03 PREDIABETES: ICD-10-CM

## 2018-08-01 LAB
CHOLEST SERPL-MCNC: 134 MG/DL
CHOLEST/HDLC SERPL: 2.5 {RATIO}
ESTIMATED AVG GLUCOSE: 123 MG/DL
HBA1C MFR BLD HPLC: 5.9 %
HDLC SERPL-MCNC: 53 MG/DL
HDLC SERPL: 39.6 %
LDLC SERPL CALC-MCNC: 66.4 MG/DL
NONHDLC SERPL-MCNC: 81 MG/DL
TRIGL SERPL-MCNC: 73 MG/DL
URATE SERPL-MCNC: 8.3 MG/DL

## 2018-08-01 PROCEDURE — 36415 COLL VENOUS BLD VENIPUNCTURE: CPT | Mod: NTX

## 2018-08-01 PROCEDURE — 80061 LIPID PANEL: CPT | Mod: TXP

## 2018-08-01 PROCEDURE — 83036 HEMOGLOBIN GLYCOSYLATED A1C: CPT | Mod: TXP

## 2018-08-01 PROCEDURE — 84550 ASSAY OF BLOOD/URIC ACID: CPT | Mod: TXP

## 2018-08-04 ENCOUNTER — HOSPITAL ENCOUNTER (EMERGENCY)
Facility: HOSPITAL | Age: 68
Discharge: HOME | End: 2018-08-04
Attending: EMERGENCY MEDICINE
Payer: MEDICARE

## 2018-08-04 VITALS
RESPIRATION RATE: 18 BRPM | DIASTOLIC BLOOD PRESSURE: 77 MMHG | HEART RATE: 79 BPM | BODY MASS INDEX: 34.53 KG/M2 | OXYGEN SATURATION: 99 % | WEIGHT: 220 LBS | TEMPERATURE: 98 F | HEIGHT: 67 IN | SYSTOLIC BLOOD PRESSURE: 135 MMHG

## 2018-08-04 DIAGNOSIS — M25.531 WRIST PAIN, RIGHT: ICD-10-CM

## 2018-08-04 DIAGNOSIS — I50.42 CHRONIC COMBINED SYSTOLIC AND DIASTOLIC HEART FAILURE: Chronic | ICD-10-CM

## 2018-08-04 PROCEDURE — 96372 THER/PROPH/DIAG INJ SC/IM: CPT | Mod: NTX

## 2018-08-04 PROCEDURE — 99284 EMERGENCY DEPT VISIT MOD MDM: CPT | Mod: NTX,,, | Performed by: EMERGENCY MEDICINE

## 2018-08-04 PROCEDURE — 63600175 PHARM REV CODE 636 W HCPCS: Mod: NTX | Performed by: STUDENT IN AN ORGANIZED HEALTH CARE EDUCATION/TRAINING PROGRAM

## 2018-08-04 PROCEDURE — 99283 EMERGENCY DEPT VISIT LOW MDM: CPT | Mod: 25,NTX

## 2018-08-04 RX ORDER — DEXAMETHASONE SODIUM PHOSPHATE 100 MG/10ML
10 INJECTION INTRAMUSCULAR; INTRAVENOUS ONCE
Status: DISCONTINUED | OUTPATIENT
Start: 2018-08-04 | End: 2018-08-04

## 2018-08-04 RX ORDER — DEXAMETHASONE SODIUM PHOSPHATE 4 MG/ML
12 INJECTION, SOLUTION INTRA-ARTICULAR; INTRALESIONAL; INTRAMUSCULAR; INTRAVENOUS; SOFT TISSUE ONCE
Status: COMPLETED | OUTPATIENT
Start: 2018-08-04 | End: 2018-08-04

## 2018-08-04 RX ORDER — METHYLPREDNISOLONE 4 MG/1
TABLET ORAL
Qty: 1 PACKAGE | Refills: 0 | Status: SHIPPED | OUTPATIENT
Start: 2018-08-04 | End: 2018-08-25

## 2018-08-04 RX ADMIN — DEXAMETHASONE SODIUM PHOSPHATE 12 MG: 4 INJECTION, SOLUTION INTRAMUSCULAR; INTRAVENOUS at 02:08

## 2018-08-04 NOTE — ED PROVIDER NOTES
Encounter Date: 8/4/2018    SCRIBE #1 NOTE: I, Nathanael Eric, am scribing for, and in the presence of,  Dr. Jacome. I have scribed the following portions of the note - the Resident attestation.       History     Chief Complaint   Patient presents with    Wrist Pain     pt complains of complains of right wrist swelling after spraining it 3 weeks ago. pt states his md diagnosed him with gout x couple days ago. pt is aox 4. pt states pain is 10/10 with limited mobility.      Patient is 68 year old male with PMHX of combined CHF with 20%EF, CAD on Plavix 75 mg, hx of MI, HTN, HLD, hx of PE, diverticulosis, and hx of gout. He presents to the ED for wrist pain. Patient was recently seen for similar complaint 3 days ago. Patient reports having right wrist pain after mechanical fall within household a week ago. Describes pain as constant and throbbing. Rates pain 9/10. Denies relief of pain with Norco and wrist splint. Says that he has been taking Prednisone since discharge 3 days ago. Denies increased swelling. Reports pain remains unchanged from onset. He denies fever,chills, nausea, vomiting, sob, chest pain, abd pain, dysuria, diarrhea, or constipation. He is a former smoker and denies alcohol use.           Review of patient's allergies indicates:   Allergen Reactions    Iodine containing multivitamin Swelling     itching    Keflex [cephalexin] Swelling     eyes    Peaches [peach (prunus persica)] Swelling     eyes    Shellfish containing products Swelling    Fig tree Swelling     itching    Tuberculin spenser test ppd Rash     Past Medical History:   Diagnosis Date    Chronic anticoagulation 5/5/2016    Chronic combined systolic and diastolic heart failure 11/26/2012    EF 10-20% on ECHO 2013    Clotting disorder     Coronary artery disease involving native coronary artery of native heart without angina pectoris 11/26/2012    Cath 10- Stents patent non-obstructive disease Cath 11-12015 non-obstructive  disease     Diverticulosis of colon     DVT (deep venous thrombosis), unspecified laterality 11/12/2015    Essential hypertension 11/15/2015    Hyperlipidemia     Hypertensive heart disease with heart failure 5/5/2016    MI (myocardial infarction) 2009    x's 5    Nicotine abuse     Obesity 11/26/2012    Pulmonary embolism 2011    Renal disorder     LAVERNE    Stented coronary artery 11/26/2012    LAD stent placed 10/17/2007      Past Surgical History:   Procedure Laterality Date    APPENDECTOMY      BACK SURGERY      CARDIAC DEFIBRILLATOR PLACEMENT      CARDIAC SURGERY  2007    stent    CARPAL TUNNEL RELEASE Right 04/2018    r knee scope      SPINE SURGERY      TONSILLECTOMY      TRIGGER FINGER RELEASE Right 04/2018    thumb     Family History   Problem Relation Age of Onset    Cancer Mother         colon cancer    Heart disease Mother     Diabetes Mother     Heart disease Father     Stroke Father     Colon polyps Father     Cancer Paternal Grandmother         leukemia    No Known Problems Sister     No Known Problems Daughter     No Known Problems Son     No Known Problems Sister     No Known Problems Son      Social History   Substance Use Topics    Smoking status: Former Smoker     Packs/day: 1.00     Years: 45.00     Types: Cigarettes     Quit date: 12/14/2005    Smokeless tobacco: Never Used      Comment: 1-1.5 ppd every day.    Alcohol use No     Review of Systems   Constitutional: Negative for appetite change, chills and fever.   HENT: Negative for congestion and hearing loss.    Eyes: Negative for discharge and itching.   Respiratory: Negative for apnea, cough, chest tightness and shortness of breath.    Cardiovascular: Negative for chest pain and palpitations.   Gastrointestinal: Negative for abdominal distention and abdominal pain.   Endocrine: Negative for cold intolerance and heat intolerance.   Genitourinary: Negative for difficulty urinating and dysuria.    Musculoskeletal: Positive for joint swelling. Negative for gait problem.   Skin: Negative for color change and pallor.   Neurological: Negative for dizziness, light-headedness and headaches.   Psychiatric/Behavioral: Negative for agitation and behavioral problems.       Physical Exam     Initial Vitals [08/04/18 0051]   BP Pulse Resp Temp SpO2   (!) 128/59 75 20 97.9 °F (36.6 °C) 96 %      MAP       --         Physical Exam    Nursing note and vitals reviewed.  Constitutional: He appears well-developed and well-nourished.   HENT:   Head: Normocephalic and atraumatic.   Eyes: Conjunctivae are normal. Pupils are equal, round, and reactive to light. Right eye exhibits no discharge. Left eye exhibits no discharge. No scleral icterus.   Neck: Normal range of motion. Neck supple.   Cardiovascular: Normal rate, regular rhythm and normal heart sounds.   Pulmonary/Chest: Breath sounds normal. No respiratory distress. He has no wheezes.   Abdominal: Soft. Bowel sounds are normal. He exhibits no distension. There is no tenderness.   Musculoskeletal:        Right wrist: He exhibits decreased range of motion, tenderness and swelling.   Neurological: He is alert and oriented to person, place, and time.   Skin: Skin is warm. Capillary refill takes less than 2 seconds.         ED Course   Procedures  Labs Reviewed - No data to display       Imaging Results          X-Ray Wrist Complete Right (Final result)  Result time 08/04/18 02:19:17    Final result by Denton Samayoa MD (08/04/18 02:19:17)                 Impression:      No acute fracture.    Stable DJD and soft tissue edema/swelling.  No significant change from prior study.      Electronically signed by: Denton Samayoa MD  Date:    08/04/2018  Time:    02:19             Narrative:    EXAMINATION:  XR WRIST COMPLETE 3 VIEWS RIGHT    CLINICAL HISTORY:  Pain in right wrist    TECHNIQUE:  PA, lateral, and oblique views of the right wrist were  performed.    COMPARISON:  None    FINDINGS:  No acute fracture or dislocation.  Degenerative changes, stable from prior.  Soft tissue swelling/edema, stable from prior.                                 Medical Decision Making:   History:   Old Medical Records: I decided to obtain old medical records.  Clinical Tests:   Radiological Study: Ordered and Reviewed       APC / Resident Notes:   Audrey Oneil Jr. is a 68 y.o. male patient presenting to the ER with chief complaint of right wrist pain which has been ongoing for a week. He came in for similar symptoms 3 days ago and was discharged on Prednisone for suspected gout flare.   Pt states that he has been taking his Prednisone and wearing his wrist splint but the pain has not improved. He says that the only thing that helps the pain is pain medicine he had at home. He is not asking for more pain meds.   On exam, pt has warm and erythematous right hand and wrist. His ROM is limited due to pain. His radial pulse is palpable. His sensation is intact distally.     Uric acid 3 days ago was 8.3    Differential diagnoses include but are not limited to gout flare, pseudogout, fracture, wrist sprain.    I discussed the care of this patient with my Supervising Physician.      Patient is hemodynamically stable, vital signs are normal.   Discharge instructions given. Prescription for Medrol dosepack given and explained.   Return to ED precautions discussed. Follow up as directed. Pt verbalized understanding of this plan.     Pt is stable for discharge.          Scribe Attestation:   Scribe #1: I performed the above scribed service and the documentation accurately describes the services I performed. I attest to the accuracy of the note.    Attending Attestation:   Physician Attestation Statement for Resident:  As the supervising MD   Physician Attestation Statement: I have personally seen and examined this patient.   I agree with the above history. -:   As the supervising MD I  agree with the above PE.    As the supervising MD I agree with the above treatment, course, plan, and disposition.                       Clinical Impression:   The encounter diagnosis was Wrist pain, right.      Disposition:   Disposition: Discharged  Condition: Stable                        Kong Cesar MD  Resident  08/04/18 4833

## 2018-08-06 RX ORDER — LISINOPRIL 5 MG/1
TABLET ORAL
Qty: 90 TABLET | Refills: 0 | OUTPATIENT
Start: 2018-08-06

## 2018-08-07 DIAGNOSIS — M1A.0720 IDIOPATHIC CHRONIC GOUT OF LEFT FOOT WITHOUT TOPHUS: ICD-10-CM

## 2018-08-07 RX ORDER — ALLOPURINOL 100 MG/1
TABLET ORAL
Qty: 90 TABLET | Refills: 0 | Status: SHIPPED | OUTPATIENT
Start: 2018-08-07 | End: 2018-11-04 | Stop reason: SDUPTHER

## 2018-08-08 RX ORDER — FUROSEMIDE 40 MG/1
TABLET ORAL
Qty: 90 TABLET | Refills: 0 | Status: SHIPPED | OUTPATIENT
Start: 2018-08-08 | End: 2018-11-04 | Stop reason: SDUPTHER

## 2018-08-08 NOTE — DISCHARGE INSTRUCTIONS
Abscess (Antibiotic Treatment Only)  An abscess (sometimes called a boil) happens when bacteria get trapped under the skin and start to grow. Pus forms inside the abscess as the body responds to the bacteria. An abscess can happen with an insect bite, ingrown hair, blocked oil gland, pimple, cyst, or puncture wound.  In the early stages, your wound may be red and tender. For this stage, you may get antibiotics. If the abscess does not get better with antibiotics, it will need to be drained with a small cut.  Home care  These tips will help you care for your abscess at home:  · Soak the wound in hot water or apply hot packs (small towel soaked in hot water) to the area for 20 minutes at a time. Do this 3 to 4 times a day.  · Do not cut, squeeze, or pop the boil yourself.  · Apply antibiotic cream or ointment to the skin 3 to 4 times a day, unless something else was prescribed. Some ointments include an antibiotic plus a pain reliever.  · If your doctor prescribed antibiotics, do not stop taking them until you have finished the medicine or the doctor tells you to stop.  · You may use an over-the-counter pain medicine to control pain, unless another pain medicine was prescribed. If you have chronic liver or kidney disease or ever had a stomach ulcer or gastrointestinal bleeding, talk with your doctor before using these any of these.  Follow-up care  Follow up with your healthcare provider, or as advised. Check your wound each day for the signs of worsening infection listed below.  When to seek medical advice  Get prompt medical attention if any of these occur:  · An increase in redness or swelling  · Red streaks in the skin leading away from the abscess  · An increase in local pain or swelling  · Fever of 100.4ºF (38ºC) or higher, or as directed by your healthcare provider  · Pus or fluid coming from the abscess  · Boil returns after getting better  Date Last Reviewed: 9/1/2016  © [id number] [employer],  From: Iva Garcia  To: Meagan Montes MD  Sent: 8/8/2018 10:35 AM CDT  Subject: hello    Just keeping you posted....Dr De La O was able to get me an urgent appointment with a different dermatologist. I saw Dr Browne yesterday. She is pretty sure my skin is irritated from the Remicade. She took more skin biopsies to help decide that. I also sent a similar note to Dr Vee. She may have already requested medical records, especially regarding the skin biopsy results he did in spring.  No need to reply...just letting you know.  [employer] [address] [place] [id number]. All rights reserved. This information is not intended as a substitute for professional medical care. Always follow your healthcare professional's instructions.

## 2018-08-16 ENCOUNTER — OFFICE VISIT (OUTPATIENT)
Dept: ELECTROPHYSIOLOGY | Facility: CLINIC | Age: 68
End: 2018-08-16
Payer: MEDICARE

## 2018-08-16 VITALS
DIASTOLIC BLOOD PRESSURE: 62 MMHG | WEIGHT: 221.13 LBS | HEIGHT: 67 IN | HEART RATE: 65 BPM | SYSTOLIC BLOOD PRESSURE: 104 MMHG | BODY MASS INDEX: 34.71 KG/M2 | OXYGEN SATURATION: 98 %

## 2018-08-16 DIAGNOSIS — I25.5 CARDIOMYOPATHY, ISCHEMIC: Primary | Chronic | ICD-10-CM

## 2018-08-16 DIAGNOSIS — I44.7 LBBB (LEFT BUNDLE BRANCH BLOCK): ICD-10-CM

## 2018-08-16 DIAGNOSIS — Z79.899 HIGH RISK MEDICATIONS (NOT ANTICOAGULANTS) LONG-TERM USE: ICD-10-CM

## 2018-08-16 DIAGNOSIS — Z95.810 AICD (AUTOMATIC CARDIOVERTER/DEFIBRILLATOR) PRESENT: Primary | ICD-10-CM

## 2018-08-16 DIAGNOSIS — I50.41 ACUTE COMBINED SYSTOLIC AND DIASTOLIC CONGESTIVE HEART FAILURE: ICD-10-CM

## 2018-08-16 DIAGNOSIS — I10 ESSENTIAL HYPERTENSION: ICD-10-CM

## 2018-08-16 DIAGNOSIS — I47.20 VT (VENTRICULAR TACHYCARDIA): ICD-10-CM

## 2018-08-16 DIAGNOSIS — E66.01 SEVERE OBESITY (BMI 35.0-39.9) WITH COMORBIDITY: Chronic | ICD-10-CM

## 2018-08-16 DIAGNOSIS — I25.5 CARDIOMYOPATHY, ISCHEMIC: Chronic | ICD-10-CM

## 2018-08-16 DIAGNOSIS — Z86.718 HISTORY OF DVT (DEEP VEIN THROMBOSIS): Chronic | ICD-10-CM

## 2018-08-16 DIAGNOSIS — Z88.8 ALLERGY TO IODINE: Primary | ICD-10-CM

## 2018-08-16 DIAGNOSIS — Z95.810 AICD (AUTOMATIC CARDIOVERTER/DEFIBRILLATOR) PRESENT: ICD-10-CM

## 2018-08-16 PROCEDURE — 3074F SYST BP LT 130 MM HG: CPT | Mod: CPTII,NTX,S$GLB, | Performed by: INTERNAL MEDICINE

## 2018-08-16 PROCEDURE — 99215 OFFICE O/P EST HI 40 MIN: CPT | Mod: NTX,S$GLB,, | Performed by: INTERNAL MEDICINE

## 2018-08-16 PROCEDURE — 99999 PR PBB SHADOW E&M-EST. PATIENT-LVL III: CPT | Mod: PBBFAC,TXP,, | Performed by: INTERNAL MEDICINE

## 2018-08-16 PROCEDURE — 99499 UNLISTED E&M SERVICE: CPT | Mod: S$GLB,,, | Performed by: NURSE PRACTITIONER

## 2018-08-16 PROCEDURE — 93005 ELECTROCARDIOGRAM TRACING: CPT | Mod: NTX,S$GLB,, | Performed by: INTERNAL MEDICINE

## 2018-08-16 PROCEDURE — 93010 ELECTROCARDIOGRAM REPORT: CPT | Mod: NTX,S$GLB,, | Performed by: INTERNAL MEDICINE

## 2018-08-16 PROCEDURE — 3078F DIAST BP <80 MM HG: CPT | Mod: CPTII,NTX,S$GLB, | Performed by: INTERNAL MEDICINE

## 2018-08-16 RX ORDER — CIMETIDINE 300 MG/1
TABLET, FILM COATED ORAL
Qty: 3 TABLET | Refills: 0 | Status: SHIPPED | OUTPATIENT
Start: 2018-08-16 | End: 2018-08-25 | Stop reason: SDUPTHER

## 2018-08-16 RX ORDER — AMIODARONE HYDROCHLORIDE 200 MG/1
200 TABLET ORAL DAILY
Qty: 135 TABLET | Refills: 0 | Status: ON HOLD | OUTPATIENT
Start: 2018-08-16 | End: 2018-11-28 | Stop reason: SDUPTHER

## 2018-08-16 RX ORDER — PREDNISONE 50 MG/1
TABLET ORAL
Qty: 3 TABLET | Refills: 0 | Status: SHIPPED | OUTPATIENT
Start: 2018-08-16 | End: 2018-09-03

## 2018-08-16 RX ORDER — HYDROCODONE BITARTRATE AND ACETAMINOPHEN 10; 325 MG/1; MG/1
1 TABLET ORAL EVERY 6 HOURS PRN
COMMUNITY
End: 2022-01-01

## 2018-08-16 NOTE — PROGRESS NOTES
"  Subjective:   Patient ID:  Audrey Oneil Jr. is a 68 y.o. male     Chief complaint:Follow up lead replacement      HPI  Background as recorded in my last note ( ):  Update since then:  Current Outpatient Medications   Medication Sig    allopurinol (ZYLOPRIM) 100 MG tablet TAKE 1 TABLET(100 MG) BY MOUTH EVERY DAY    amiodarone (PACERONE) 200 MG Tab TAKE 1 AND 1/2 TABLETS(300 MG) BY MOUTH EVERY DAY    aspirin (ECOTRIN) 325 MG EC tablet Take 325 mg by mouth once daily.    atorvastatin (LIPITOR) 80 MG tablet TAKE 1 TABLET(80 MG) BY MOUTH EVERY DAY    clopidogrel (PLAVIX) 75 mg tablet Take 1 tablet (75 mg total) by mouth once daily.    furosemide (LASIX) 40 MG tablet TAKE 1 TABLET(40 MG) BY MOUTH EVERY DAY    HYDROcodone-acetaminophen (NORCO)  mg per tablet Take 1 tablet by mouth every 6 (six) hours as needed for Pain.    metoprolol succinate (TOPROL-XL) 50 MG 24 hr tablet TAKE 1 TABLET BY MOUTH EVERY DAY    predniSONE (DELTASONE) 10 MG tablet Take 4 tabs daily x2 days, then 3 tab qday x2 days, then 2 tab qday x2 days, then 1 tab qday x2 days, then 1/2 tab qday x2 days then stop.    docusate sodium (COLACE) 50 MG capsule Take 1 capsule (50 mg total) by mouth 2 (two) times daily.    methylPREDNISolone (MEDROL DOSEPACK) 4 mg tablet Follow package instructions     No current facility-administered medications for this visit.      ROS    Objective:   Physical Exam  /62   Pulse 65   Ht 5' 7" (1.702 m)   Wt 100.3 kg (221 lb 1.9 oz)   SpO2 98%   BMI 34.63 kg/m²      Assessment:      1. AICD (automatic cardioverter/defibrillator) present    2. Cardiomyopathy, ischemic    3. Essential hypertension    4. LBBB (left bundle branch block)    5. Severe obesity (BMI 35.0-39.9) with comorbidity    6. History of DVT (deep vein thrombosis)        Plan:      No orders of the defined types were placed in this encounter.    No Follow-up on file.  There are no discontinued medications.  New Prescriptions    No " medications on file     Modified Medications    No medications on file

## 2018-08-16 NOTE — PROGRESS NOTES
Mr. Oneil is a patient of Dr. Pal and was last seen in clinic 4/19/2018.      Subjective:   Patient ID:  Audrey Oneil Jr. is a 68 y.o. male who presents for follow-up of Follow up lead replacement  .     HPI:    Mr. Oneil is a 68 y.o. male with CAD (s/p PCI LAD 2007), CHF/ICM, PE (2010), HTN, HLD, ICD (s/p VT) here for follow up.     Background:    Patient has a history of CAD s/p STEMI (s/p PCI LAD 2007), CHF/ICM and remote MI, quit smoking Dec 2015,  PE (2010, s/p 1-yr coumadin), HTN, DLP and s/p ICD [employer] placement due to VT.   He was last seen by Dr. Marshall 6/12/17 and had RHC 7/10/17. He had been declining over the last several months and most recent CPX (2/15/17) that showed low Peak VO2 of 7.5ml/kg/min but AT was not attained and RER was only 0.67 (poor effort). Most recent 2D echo showed severely depressed LV function with an EF=10-15%, LV with a LVEDD of 5.9  cm, normal RV size and function. Clinically reports NYHA class II-III symptoms.  RHC done showed normal left and right sided filling presssures. Low normal CO/CI.     1/2018: NYHA Class II-III still as he is participating well with rehab. He has lost 5-6lbs over last 3 months and is trying to eat right and exercise. No HF readmissions and no cardiopulmonary complaints tdoay. He had a recent ER visit for gout flare but pain has resolved.      4/2018: He had a bruise on his ICD pocket and was worried about it - so he came to see us.  He has a LBBB that started developing in 11/2015 and with slowly gradually widening QRS duration (124 in 11/2015 and now 140). His EF however has been as low as 10% 4 years ago.   I have reviewed the actual image of the ECG tracing obtained today and it shows NSR with LBBB and QRSd 140    Echo 4/2018: The Blue Index was 53.1 msec, suggesting severe left ventricular dyssynchrony.    Update (08/16/2018):    Today he feels well and denies cardiac complaints. Mr. Oneil denies chest pain with exertion or at rest,  palpitations, SOB, HENSLEY, dizziness, or syncope.  He exercises on the treadmill for 10 minutes twice a day and has no limitations in activity tolerance.    He is taking amiodarone 300mg daily. He is also taking ASA and plavix. Creatinine is 1.9 which is close to baseline. Hepatic transaminases are WNL. TSH is WNL. Last PFTs 5/4/2018 showed DLCO ratio of 65. Amiodarone was to be reduced to 200mg daily but he has not yet done this.    Device Interrogation (7/24/2018) reveals stable lead and device function. No arrhythmias or treated episodes were noted.  He paces 24% in the RA and 0% in the RV. Estimated battery longevity 5-6 years.     I have personally reviewed the patient's EKG today, which shows sinus rhythm at 64bpm. AK interval is 182. . QTc is 470.    Recent Cardiac Tests:    2D Echo (o4/27/2018):  CONCLUSIONS     1 - Upper limit of normal left ventricular enlargement.     2 - Severely depressed left ventricular systolic function (EF upper teens to low 20s).     3 - Mild left atrial enlargement.     4 - Impaired LV relaxation, normal LAP (grade 1 diastolic dysfunction).     5 - Normal right ventricular systolic function .     6 - Trivial mitral regurgitation.     7 - Trivial to mild tricuspid regurgitation.     8 - The estimated PA systolic pressure is 25 mmHg.     9 - TDI as per text.     Current Outpatient Medications   Medication Sig    allopurinol (ZYLOPRIM) 100 MG tablet TAKE 1 TABLET(100 MG) BY MOUTH EVERY DAY    amiodarone (PACERONE) 200 MG Tab TAKE 1 AND 1/2 TABLETS(300 MG) BY MOUTH EVERY DAY    aspirin (ECOTRIN) 325 MG EC tablet Take 325 mg by mouth once daily.    atorvastatin (LIPITOR) 80 MG tablet TAKE 1 TABLET(80 MG) BY MOUTH EVERY DAY    clopidogrel (PLAVIX) 75 mg tablet Take 1 tablet (75 mg total) by mouth once daily.    furosemide (LASIX) 40 MG tablet TAKE 1 TABLET(40 MG) BY MOUTH EVERY DAY    HYDROcodone-acetaminophen (NORCO)  mg per tablet Take 1 tablet by mouth every 6 (six)  "hours as needed for Pain.    metoprolol succinate (TOPROL-XL) 50 MG 24 hr tablet TAKE 1 TABLET BY MOUTH EVERY DAY    predniSONE (DELTASONE) 10 MG tablet Take 4 tabs daily x2 days, then 3 tab qday x2 days, then 2 tab qday x2 days, then 1 tab qday x2 days, then 1/2 tab qday x2 days then stop.    docusate sodium (COLACE) 50 MG capsule Take 1 capsule (50 mg total) by mouth 2 (two) times daily.    methylPREDNISolone (MEDROL DOSEPACK) 4 mg tablet Follow package instructions     No current facility-administered medications for this visit.      Review of Systems   Constitution: Negative for malaise/fatigue.   Cardiovascular: Negative for chest pain, dyspnea on exertion, irregular heartbeat, leg swelling and palpitations.   Respiratory: Negative for shortness of breath.    Hematologic/Lymphatic: Negative for bleeding problem.   Skin: Negative for rash.   Musculoskeletal: Positive for falls and gout. Negative for myalgias.   Gastrointestinal: Negative for hematemesis, hematochezia and nausea.   Genitourinary: Negative for hematuria.   Neurological: Negative for light-headedness.   Psychiatric/Behavioral: Negative for altered mental status.   Allergic/Immunologic: Negative for persistent infections.     Objective:        /62   Pulse 65   Ht 5' 7" (1.702 m)   Wt 100.3 kg (221 lb 1.9 oz)   SpO2 98%   BMI 34.63 kg/m²     Physical Exam   Constitutional: He is oriented to person, place, and time. He appears well-developed and well-nourished.   HENT:   Head: Normocephalic.   Nose: Nose normal.   Eyes: Pupils are equal, round, and reactive to light.   Cardiovascular: Normal rate, regular rhythm, S1 normal and S2 normal.   No murmur heard.  Pulses:       Radial pulses are 2+ on the right side, and 2+ on the left side.   Pulmonary/Chest: Breath sounds normal. No respiratory distress.   Device to LUCW   Abdominal: Normal appearance.   Musculoskeletal: Normal range of motion. He exhibits no edema.   Neurological: He is " alert and oriented to person, place, and time.   Skin: Skin is warm and dry. No erythema.   Psychiatric: He has a normal mood and affect. His speech is normal and behavior is normal.   Nursing note and vitals reviewed.    Lab Results   Component Value Date     06/26/2018     06/26/2018    K 4.5 06/26/2018    K 4.5 06/26/2018    MG 2.2 06/24/2018    BUN 50 (H) 06/26/2018    BUN 50 (H) 06/26/2018    CREATININE 1.9 (H) 06/26/2018    CREATININE 1.9 (H) 06/26/2018    ALT 39 06/26/2018    AST 24 06/26/2018    HGB 12.6 (L) 06/18/2018    HCT 38.6 (L) 06/18/2018    HCT 38 01/27/2018    TSH 0.833 05/04/2018    LDLCALC 66.4 08/01/2018       Recent Labs   Lab  11/26/17   0024  04/19/18   0130  06/15/18   1235  06/26/18   1420   INR  0.9  0.9  1.0  1.1           Assessment:         1. AICD (automatic cardioverter/defibrillator) present    2. Cardiomyopathy, ischemic    3. Essential hypertension    4. LBBB (left bundle branch block)    5. Severe obesity (BMI 35.0-39.9) with comorbidity    6. History of DVT (deep vein thrombosis)    7. VT (ventricular tachycardia)      Plan:     Mr. Oneil is a 68 y.o. male with CAD (s/p PCI LAD 2007), CHF/ICM, PE (2010), HTN, HLD, ICD (s/p VT) here for follow up.   He has a widening QRS and high Blue index indicating severe cardiac dyssynchrony. EF upper teens-low 20s.  Discussed CRT-D upgrade to help prevent further deterioration of LV function. Dr. Marshall has been consulted and is fine with this.  Dr. Pal had a detailed discussion about the risks, benefits, and alternatives to CRT-D upgrade. Patient understood and decided to proceed.    Schedule CRT-D upgrade.  Reduce amiodarone to 200mg daily (per instructions from Dr. Pal - see result note PFTs 5/4/2018).  Continue other medications.  RTC as scheduled following procedure.    *Case was discussed with Dr. Pal, who also counseled the patient.*    Follow-up as scheduled following  procedure.    ------------------------------------------------------------------    TANYA Stout, NP-C  [facility]

## 2018-08-16 NOTE — PROGRESS NOTES
IMPLANTABLE DEVICE EDUCATION CHECKLIST    PRE PROCEDURE TESTING     9-6-18 @ 10 AM   Pre-procedure labs have been ordered for you at:  [facility]  · Be sure to arrive at your scheduled time. YOU DO NOT HAVE TO FAST FOR THIS LAB WORK!      DAY OF PROCEDURE    9-11-18 @ 10:30 AM   Report to Cardiology Waiting Room on the 3rd floor of the Hospital  · Wash your chest with HIBICLENS OR AN ANTIBACTERIAL SOAP (such as Dial) on the night before and the morning of your procedure.  · Please do not wear makeup, especially mascara, when arriving for your procedure.  · Do not eat or drink anything after 12 mn on the night before your procedure    Medications  You will need to take medications for your allergy to iodine before your procedure .  A prescription has been sent to [facility] for Prednisone 50 mg, Tagamet 300 mg, and Benadryl 50 mg  Take 1 tab of each medication at 6 pm & 11 pm on night before your procedure then 1 tab at 6 am on the day of your procedure  · You may take your other usual morning medications with a sip of water.    Directions to Cardiology Waiting Room  If you park in the Parking Garage:  Take elevators to the 2nd floor  Walk up ramp and turn right by Gold Elevators  Take elevator to the 3rd floor  Upon exiting the elevator, turn away from the clinic areas  Walk long roberts around to front of hospital to area with windows overlooking [facility]  Check in at Reception Desk  OR  If family is dropping you off:  Have them drop you off at the front of the Hospital  (Near the ER, where all the flags are hung).  Take the E elevators to the 3rd floor.  Check in at the Reception Desk in the waiting room.        · YOU WILL BE SPENDING THE NIGHT AFTER YOUR PROCEDURE  · YOU WILL NEED SOMEONE TO DRIVE YOU HOME THE DAY AFTER YOUR PROCEDURE  · YOUR PAIN DURING YOUR PROCEDURE WILL BE MANAGED BY THE ANESTHESIA TEAM    Any need to reschedule or cancel procedures, or any questions  regarding your procedures should be addressed directly with the Arrhythmia Department Nurses at the following phone number: [phone]

## 2018-08-16 NOTE — PROGRESS NOTES
Post-Procedure Patient Discharge Instructions  Pacemaker/Defibrillator    Wound Care   If you are discharged with a standard dressing over the incision, you may remove the dressing after 24 hours.    If you are discharged with an AQUACEL dressing, you should keep it on until your follow-up appointment in 1-2 weeks.   If there are Steri-strips (strips of tape) over your incision, leave them on until your follow-up appointment. They may begin to fall off on their own, which is normal. If there is Dermabond (clear glue) over your incision, do not scrub it off. It acts as a barrier and will eventually disappear.   You may be discharged with 5 days of oral antibiotics. Please take the full prescription until it is gone.   Do not get the incision wet for 48 hours following the procedure. You may sponge bath during this period, working around the incision. After 48 hours, you may shower, but you should still try to keep this area as dry as possible, and avoid direct water contact to the incision (allow the water to hit back of your shoulder rather than directly on the incision). Gently pat the incision dry if it does get wet.   You may take regular showers after 2 weeks, unless otherwise indicated at your follow-up visit.   Do not submerge the incision in a tub, pool, or body of water for 6 weeks.   Avoid using deodorants, powders, creams, lotions, etc. on your incision for 6 weeks.   If you notice unusual swelling, redness, drainage, have more device site pain, chest pain, shortness of breath, or have a fever greater than 100 degrees, call our device clinic immediately: [phone] or [phone] during normal office hours. You may call [phone] after-hours or on weekends and ask for the electrophysiologist on call.    Activity   If you only had a battery/generator change performed, there are no postoperative activity restrictions.    If this is your first device or if you had new wires added to  your existing device, then you will be discharged in a sling which you should wear continuously for 48 hours. After that, the sling should remain off during the day but should be worn at night for another 2-4 weeks (depending on how active a sleeper you are).   Do not raise your device-side arm above your shoulder for 6 weeks. Do not lift more than 5-10 lbs with your device-side arm for 6 weeks.    If you were driving prior to the procedure, you may resume driving after your first follow-up appointment (1-2 weeks). If you have a history of passing out or a history of certain arrhythmias, there may be driving restrictions unrelated to the procedure. Please clarify with your physician if this is the case.   No heavy activity with the affected arm for 6 weeks (eg. tennis, golf, bowling, aerobics, mowing the lawn, etc.).   Avoid rough contact at the device site for 6 weeks.   You may participate in sexual activity unless otherwise instructed.   You may return to work within 3-5 days unless told otherwise, provided you adhere to the above activity restrictions.    Long-Term Instructions   Keep your pacemaker or defibrillator identification card with you at all times.   If you have a defibrillator and you get shocked by the device: If you receive one shock and you feel ok, you may call [phone] or [phone] during normal office hours. You may call [phone] after-hours. If you receive one shock and you do not feel well, call Emergency Medical Services. They will take you to the nearest emergency room.   If you have a defibrillator and you get more than one shock from the device or multiple shocks in a short period of time: Call Emergency Medical Services. They will take you to the nearest emergency room.   Appliances: You may operate any electrical device in your home, including microwaves.   Security Systems: Electromagnetic security systems can be located in the workplace, courthouses, or  other high-security areas. Exposure to this type of security system has been shown to cause interference in some cases. Interference may be related to the duration of exposure and/or the distance between the device and the security device. You should be aware of the location of security systems, move through them at a normal pace, and avoid leaning or standing too close.   Metal Detectors at Airports: Metal detectors at airports can potentially interfere with pacemakers or defibrillators, although this is unlikely. Metal detectors will likely be triggered by your device and therefore at places such as airports  it will be important for you to carry your identification card for the pacemaker/defibrillator. Airport personnel will likely prefer to do a manual search.   Cellular Phones: It is unlikely that using a cellular phone will interfere with your device. It should be used with the hand opposite to the side where your device was implanted. The phone should not be carried in the shirt pocket on the same side as the device.   Specific Work Conditions: Patients who work near high-voltage lines, transmitting towers, large motors, welding equipment, or powerful magnets should discuss their specific situation with their physician. In general, remain at least two feet from external electrical equipment, verify that the equipment is properly grounded, and wear insulated gloves when using electrical devices. Leave the immediate location if lightheadedness or other symptoms develop.   MRI: Some pacemakers and defibrillators are safe in MRI scanners, while others are not. Please consult with your physician to see if you have an MRI-compatible device.   Surgery: Should you require surgery in the future, some electrosurgical devices can interfere with your device function. You should discuss this with your surgeon before any operation.   Radiation Therapy: If you ever require radiation therapy in the future, care must be  taken to avoid irradiating the device.    Long-Term Follow-Up   Your device has the ability to transmit device information from home to the doctors office using a home monitoring system.   This remote system takes the place of a doctors visit. Your device will be checked from home every 3-6 months. Every 6-12 months, you will be asked to come into the office for an in-office check.   Your device should last in the range of 6-12 years. This depends on many factors including how often it paces the heart.   When the battery is low, a generator change will be performed. This is a same-day procedure with no post-op activity restrictions, unless one of the pacemaker or defibrillator leads needs to be replaced at that time, or a new lead is added to your existing system.

## 2018-08-16 NOTE — LETTER
August 16, 2018      Breezy Gongora MD  [address]  [facility] [address]           [facility]  [address]  [facility] [address]  Phone: [phone]  Fax: [phone]          Patient: Audrey Oneil Jr.   MR Number: [id number]   YOB: 1950   Date of Visit: 8/16/2018       Dear Dr. Breezy Gongora:    Thank you for referring Audrey Oneil to me for evaluation. Attached you will find relevant portions of my assessment and plan of care.    If you have questions, please do not hesitate to call me. I look forward to following Audrey Oneil along with you.    Sincerely,    Shefali Hurst, JIMBO    Enclosure  CC:  No Recipients    If you would like to receive this communication electronically, please contact [email or web address] or [phone] to request more information on [employer] access.    For providers and/or their staff who would like to refer a patient to [facility], please contact us through our one-stop-shop provider referral line, [facility], at [phone].    If you feel you have received this communication in error or would no longer like to receive these types of communications, please e-mail [email or web address]

## 2018-08-20 DIAGNOSIS — I25.5 CARDIOMYOPATHY, ISCHEMIC: Primary | ICD-10-CM

## 2018-08-24 DIAGNOSIS — Z88.8 ALLERGY TO IODINE: ICD-10-CM

## 2018-08-25 DIAGNOSIS — Z88.8 ALLERGY TO IODINE: ICD-10-CM

## 2018-08-25 RX ORDER — CIMETIDINE 300 MG/1
TABLET, FILM COATED ORAL
Qty: 3 TABLET | Refills: 0 | Status: SHIPPED | OUTPATIENT
Start: 2018-08-25 | End: 2018-08-27

## 2018-08-25 RX ORDER — CIMETIDINE 300 MG/1
TABLET, FILM COATED ORAL
Qty: 3 TABLET | Refills: 0 | Status: SHIPPED | OUTPATIENT
Start: 2018-08-25 | End: 2018-08-27 | Stop reason: SDUPTHER

## 2018-08-27 ENCOUNTER — OFFICE VISIT (OUTPATIENT)
Dept: TRANSPLANT | Facility: CLINIC | Age: 68
End: 2018-08-27
Payer: MEDICARE

## 2018-08-27 VITALS
SYSTOLIC BLOOD PRESSURE: 106 MMHG | HEIGHT: 67 IN | BODY MASS INDEX: 35.61 KG/M2 | WEIGHT: 226.88 LBS | HEART RATE: 75 BPM | DIASTOLIC BLOOD PRESSURE: 69 MMHG

## 2018-08-27 DIAGNOSIS — I50.42 CHRONIC COMBINED SYSTOLIC AND DIASTOLIC HEART FAILURE: Primary | Chronic | ICD-10-CM

## 2018-08-27 DIAGNOSIS — E78.5 HYPERLIPIDEMIA LDL GOAL <70: ICD-10-CM

## 2018-08-27 DIAGNOSIS — Z95.810 AICD (AUTOMATIC CARDIOVERTER/DEFIBRILLATOR) PRESENT: ICD-10-CM

## 2018-08-27 DIAGNOSIS — I25.10 CORONARY ARTERY DISEASE INVOLVING NATIVE CORONARY ARTERY OF NATIVE HEART WITHOUT ANGINA PECTORIS: ICD-10-CM

## 2018-08-27 DIAGNOSIS — I25.5 CARDIOMYOPATHY, ISCHEMIC: Chronic | ICD-10-CM

## 2018-08-27 DIAGNOSIS — I10 ESSENTIAL HYPERTENSION: ICD-10-CM

## 2018-08-27 PROCEDURE — 99499 UNLISTED E&M SERVICE: CPT | Mod: S$GLB,,, | Performed by: INTERNAL MEDICINE

## 2018-08-27 PROCEDURE — 3078F DIAST BP <80 MM HG: CPT | Mod: CPTII,S$GLB,, | Performed by: INTERNAL MEDICINE

## 2018-08-27 PROCEDURE — 3074F SYST BP LT 130 MM HG: CPT | Mod: CPTII,S$GLB,, | Performed by: INTERNAL MEDICINE

## 2018-08-27 PROCEDURE — 99999 PR PBB SHADOW E&M-EST. PATIENT-LVL III: CPT | Mod: PBBFAC,TXP,, | Performed by: INTERNAL MEDICINE

## 2018-08-27 PROCEDURE — 99214 OFFICE O/P EST MOD 30 MIN: CPT | Mod: S$GLB,,, | Performed by: INTERNAL MEDICINE

## 2018-08-27 NOTE — LETTER
August 27, 2018        Elvin Simms Jr.  [address]  [place] [address]  Phone: [phone]  Fax: [phone]             [facility]  [address]  [facility] [address]  Phone: [phone]   Patient: Audrey Oneil Jr.   MR Number: [id number]   YOB: 1950   Date of Visit: 8/27/2018       Dear Dr. Elvin Simms Jr.    Thank you for referring Audrey Oneil to me for evaluation. Attached you will find relevant portions of my assessment and plan of care.    If you have questions, please do not hesitate to call me. I look forward to following Audrey Oneil along with you.    Sincerely,    Edison Marshall MD    Enclosure    If you would like to receive this communication electronically, please contact [email or web address] or [phone] to request [employer] access.    [employer] is a tool which provides read-only access to select patient information with whom you have a relationship. Its easy to use and provides real time access to review your patients record including encounter summaries, notes, results, and demographic information.    If you feel you have received this communication in error or would no longer like to receive these types of communications, please e-mail [email or web address]

## 2018-08-27 NOTE — PROGRESS NOTES
Subjective:   Transplant status: active    HPI:  Mr. Oneil is a very pleasant 67 y.o. male with a hx of CAD s/p STEMI (s/p PCI LAD 2007), CHF/ICM and remote MI, quit smoking Dec 2015,  PE (2010, s/p 1-yr coumadin), HTN, DLP and s/p ICD [employer] placement due to VT who comes for a regular follow-up. I saw him back in 6/12/17 and had RHC 7/10/17 (see below). He has been declining over the last several months and most recent CPX (2/15/17) that showed low Peak VO2 of 7.5ml/kg/min but AT was not attained and RER was only 0.67 (poor effort). Most recent 2D echo showed severely depressed LV function with an EF=10-15%, LV with a LVEDD of 5.9  cm, normal RV size and function. Recently discharged from the emergency room with acute decompensated and acute renal failure. Today he reports FC III NYHA. Off lisinopril and aldactone because for hyperkalemia. Of note, he is scheduled to undergo CRT upgrade with Dr. Elkins next month.       RHC 7/2017:  RA: 5/4 (1)  RV: 31/-6  PW:  (6)  PA: 32/2 (15)  AO: 120/64 (91)  PA_SAT: 64    CONDITION 1:  FICKCI: 2.1400  FICKCO: 4.6600    Past Medical History:   Diagnosis Date    Chronic anticoagulation 5/5/2016    Chronic combined systolic and diastolic heart failure 11/26/2012    EF 10-20% on ECHO 2013    Clotting disorder     Coronary artery disease involving native coronary artery of native heart without angina pectoris 11/26/2012    Cath 10- Stents patent non-obstructive disease Cath 11-12015 non-obstructive disease     Diverticulosis of colon     DVT (deep venous thrombosis), unspecified laterality 11/12/2015    Essential hypertension 11/15/2015    Hyperlipidemia     Hypertensive heart disease with heart failure 5/5/2016    MI (myocardial infarction) 2009    x's 5    Nicotine abuse     Obesity 11/26/2012    Pulmonary embolism 2011    Renal disorder     LAVERNE    Stented coronary artery 11/26/2012    LAD stent placed 10/17/2007      Past Surgical History:   Procedure  "Laterality Date    APPENDECTOMY      BACK SURGERY      CARDIAC DEFIBRILLATOR PLACEMENT      CARDIAC SURGERY  2007    stent    CARPAL TUNNEL RELEASE Right 04/2018    r knee scope      SPINE SURGERY      TONSILLECTOMY      TRIGGER FINGER RELEASE Right 04/2018    thumb       Review of Systems   Constitution: Negative. Negative for chills, decreased appetite, diaphoresis, fever, weakness, malaise/fatigue, night sweats, weight gain and weight loss.   Eyes: Negative.    Cardiovascular: Positive for dyspnea on exertion, orthopnea and paroxysmal nocturnal dyspnea. Negative for chest pain, claudication, cyanosis, irregular heartbeat, leg swelling, near-syncope, palpitations and syncope.   Respiratory: Negative for cough, hemoptysis and shortness of breath.    Endocrine: Negative.    Hematologic/Lymphatic: Negative.    Skin: Negative for color change, dry skin and nail changes.   Musculoskeletal: Negative.    Gastrointestinal: Negative.    Genitourinary: Negative.        Objective:   Blood pressure 106/69, pulse 75, height 5' 7" (1.702 m), weight 102.9 kg (226 lb 13.7 oz).body mass index is 35.53 kg/m².  Physical Exam   Constitutional: He appears well-developed.   /69 (BP Location: Right arm, Patient Position: Sitting, BP Method: Medium (Automatic))   Pulse 75   Ht 5' 7" (1.702 m)   Wt 102.9 kg (226 lb 13.7 oz)   BMI 35.53 kg/m²      HENT:   Head: Normocephalic.   Neck: No JVD present. Carotid bruit is not present.   Cardiovascular: Regular rhythm, normal heart sounds and normal pulses. PMI is displaced. Exam reveals no gallop and no S3.   No murmur heard.  Pulmonary/Chest: Effort normal and breath sounds normal. No respiratory distress. He has no wheezes. He has no rales.   Abdominal: Soft. Bowel sounds are normal. He exhibits distension. There is no tenderness. There is no rebound.   Musculoskeletal: He exhibits edema.   Neurological: He is alert.   Skin: Skin is warm.   Vitals reviewed.      Labs:    " Chemistry        Component Value Date/Time     06/26/2018 1420     06/26/2018 1420    K 4.5 06/26/2018 1420    K 4.5 06/26/2018 1420     06/26/2018 1420     06/26/2018 1420    CO2 28 06/26/2018 1420    CO2 28 06/26/2018 1420    BUN 50 (H) 06/26/2018 1420    BUN 50 (H) 06/26/2018 1420    CREATININE 1.9 (H) 06/26/2018 1420    CREATININE 1.9 (H) 06/26/2018 1420     (H) 06/26/2018 1420     (H) 06/26/2018 1420        Component Value Date/Time    CALCIUM 9.6 06/26/2018 1420    CALCIUM 9.6 06/26/2018 1420    ALKPHOS 113 06/26/2018 1420    AST 24 06/26/2018 1420    ALT 39 06/26/2018 1420    BILITOT 0.4 06/26/2018 1420    ESTGFRAFRICA 41.5 (A) 06/26/2018 1420    ESTGFRAFRICA 41.5 (A) 06/26/2018 1420    EGFRNONAA 35.9 (A) 06/26/2018 1420    EGFRNONAA 35.9 (A) 06/26/2018 1420          Magnesium   Date Value Ref Range Status   06/24/2018 2.2 1.6 - 2.6 mg/dL Final     Lab Results   Component Value Date    WBC 6.20 06/18/2018    HGB 12.6 (L) 06/18/2018    HCT 38.6 (L) 06/18/2018     06/18/2018     Lab Results   Component Value Date    INR 1.1 06/26/2018    INR 1.0 06/15/2018    INR 0.9 04/19/2018     BNP   Date Value Ref Range Status   06/15/2018 51 0 - 99 pg/mL Final     Comment:     Values of less than 100 pg/ml are consistent with non-CHF populations.   04/19/2018 89 0 - 99 pg/mL Final     Comment:     Values of less than 100 pg/ml are consistent with non-CHF populations.   01/12/2018 44 0 - 99 pg/mL Final     Comment:     Values of less than 100 pg/ml are consistent with non-CHF populations.     Assessment:      1. Chronic combined systolic and diastolic heart failure    2. Cardiomyopathy, ischemic    3. Coronary artery disease involving native coronary artery of native heart without angina pectoris    4. Essential hypertension    5. Hyperlipidemia LDL goal <70    6. AICD (automatic cardioverter/defibrillator) present        Plan:   67 y/o male with ICMP, HFrEF stage C with NYHA  class III symptoms  Continue current HF regimen  CRT upgrade as scheduled with Dr. Elkins  Recommend 2 gram sodium restriction and 1500cc fluid restriction.  Encourage physical activity with graded exercise program.  Requested patient to weigh themselves daily, and to notify us if their weight increases by more than 3 lbs in 1 day or 5 lbs in 1 week.   RTC in 3 months with labs     Edison Marshall MD

## 2018-08-28 ENCOUNTER — DOCUMENTATION ONLY (OUTPATIENT)
Dept: TRANSPLANT | Facility: CLINIC | Age: 68
End: 2018-08-28

## 2018-08-28 NOTE — PROGRESS NOTES
Per order of Dr Marshall, pt to follow in HF section upon return as he is not being considered for advanced options.

## 2018-08-29 ENCOUNTER — HOSPITAL ENCOUNTER (OUTPATIENT)
Facility: HOSPITAL | Age: 68
Discharge: HOME | End: 2018-08-30
Attending: EMERGENCY MEDICINE | Admitting: INTERNAL MEDICINE
Payer: MEDICARE

## 2018-08-29 DIAGNOSIS — I50.9 CONGESTIVE HEART FAILURE, UNSPECIFIED HF CHRONICITY, UNSPECIFIED HEART FAILURE TYPE: Primary | ICD-10-CM

## 2018-08-29 DIAGNOSIS — I10 ESSENTIAL HYPERTENSION: ICD-10-CM

## 2018-08-29 DIAGNOSIS — R06.00 DYSPNEA: ICD-10-CM

## 2018-08-29 DIAGNOSIS — R07.89 CHEST TIGHTNESS: ICD-10-CM

## 2018-08-29 DIAGNOSIS — Z95.810 AICD (AUTOMATIC CARDIOVERTER/DEFIBRILLATOR) PRESENT: ICD-10-CM

## 2018-08-29 DIAGNOSIS — I25.10 CORONARY ARTERY DISEASE INVOLVING NATIVE CORONARY ARTERY OF NATIVE HEART WITHOUT ANGINA PECTORIS: ICD-10-CM

## 2018-08-29 PROBLEM — R06.02 SOB (SHORTNESS OF BREATH): Status: ACTIVE | Noted: 2018-08-29

## 2018-08-29 LAB
ALBUMIN SERPL BCP-MCNC: 3.6 G/DL
ALP SERPL-CCNC: 113 U/L
ALT SERPL W/O P-5'-P-CCNC: 39 U/L
ANION GAP SERPL CALC-SCNC: 11 MMOL/L
AST SERPL-CCNC: 27 U/L
BASOPHILS # BLD AUTO: 0.04 K/UL
BASOPHILS NFR BLD: 0.6 %
BILIRUB SERPL-MCNC: 0.5 MG/DL
BNP SERPL-MCNC: 79 PG/ML
BUN SERPL-MCNC: 19 MG/DL
CALCIUM SERPL-MCNC: 9.1 MG/DL
CHLORIDE SERPL-SCNC: 104 MMOL/L
CO2 SERPL-SCNC: 27 MMOL/L
CREAT SERPL-MCNC: 1.1 MG/DL
D DIMER PPP IA.FEU-MCNC: 1.8 MG/L FEU
DIFFERENTIAL METHOD: ABNORMAL
EOSINOPHIL # BLD AUTO: 0.2 K/UL
EOSINOPHIL NFR BLD: 2.5 %
ERYTHROCYTE [DISTWIDTH] IN BLOOD BY AUTOMATED COUNT: 14.4 %
EST. GFR  (AFRICAN AMERICAN): >60 ML/MIN/1.73 M^2
EST. GFR  (NON AFRICAN AMERICAN): >60 ML/MIN/1.73 M^2
GLUCOSE SERPL-MCNC: 95 MG/DL
HCT VFR BLD AUTO: 42.2 %
HGB BLD-MCNC: 13.7 G/DL
IMM GRANULOCYTES # BLD AUTO: 0.06 K/UL
IMM GRANULOCYTES NFR BLD AUTO: 0.9 %
INR PPP: 0.9
LYMPHOCYTES # BLD AUTO: 1.9 K/UL
LYMPHOCYTES NFR BLD: 30.5 %
MAGNESIUM SERPL-MCNC: 2.2 MG/DL
MCH RBC QN AUTO: 30.2 PG
MCHC RBC AUTO-ENTMCNC: 32.5 G/DL
MCV RBC AUTO: 93 FL
MONOCYTES # BLD AUTO: 0.7 K/UL
MONOCYTES NFR BLD: 11.4 %
NEUTROPHILS # BLD AUTO: 3.4 K/UL
NEUTROPHILS NFR BLD: 54.1 %
NRBC BLD-RTO: 0 /100 WBC
PLATELET # BLD AUTO: 235 K/UL
PMV BLD AUTO: 10.2 FL
POTASSIUM SERPL-SCNC: 3.7 MMOL/L
PROT SERPL-MCNC: 6.6 G/DL
PROTHROMBIN TIME: 9.9 SEC
RBC # BLD AUTO: 4.53 M/UL
SODIUM SERPL-SCNC: 142 MMOL/L
TROPONIN I SERPL DL<=0.01 NG/ML-MCNC: 0.04 NG/ML
WBC # BLD AUTO: 6.33 K/UL

## 2018-08-29 PROCEDURE — 84484 ASSAY OF TROPONIN QUANT: CPT

## 2018-08-29 PROCEDURE — 63600175 PHARM REV CODE 636 W HCPCS: Performed by: INTERNAL MEDICINE

## 2018-08-29 PROCEDURE — 85610 PROTHROMBIN TIME: CPT

## 2018-08-29 PROCEDURE — 96374 THER/PROPH/DIAG INJ IV PUSH: CPT

## 2018-08-29 PROCEDURE — 93010 ELECTROCARDIOGRAM REPORT: CPT | Mod: ,,, | Performed by: INTERNAL MEDICINE

## 2018-08-29 PROCEDURE — 99220 PR INITIAL OBSERVATION CARE,LEVL III: CPT | Mod: ,,, | Performed by: INTERNAL MEDICINE

## 2018-08-29 PROCEDURE — 99285 EMERGENCY DEPT VISIT HI MDM: CPT | Mod: 25

## 2018-08-29 PROCEDURE — 63600175 PHARM REV CODE 636 W HCPCS: Performed by: EMERGENCY MEDICINE

## 2018-08-29 PROCEDURE — 80053 COMPREHEN METABOLIC PANEL: CPT

## 2018-08-29 PROCEDURE — 85379 FIBRIN DEGRADATION QUANT: CPT

## 2018-08-29 PROCEDURE — 83880 ASSAY OF NATRIURETIC PEPTIDE: CPT

## 2018-08-29 PROCEDURE — 99285 EMERGENCY DEPT VISIT HI MDM: CPT | Mod: ,,, | Performed by: EMERGENCY MEDICINE

## 2018-08-29 PROCEDURE — 83735 ASSAY OF MAGNESIUM: CPT

## 2018-08-29 PROCEDURE — 96376 TX/PRO/DX INJ SAME DRUG ADON: CPT

## 2018-08-29 PROCEDURE — 93005 ELECTROCARDIOGRAM TRACING: CPT

## 2018-08-29 PROCEDURE — G0378 HOSPITAL OBSERVATION PER HR: HCPCS

## 2018-08-29 PROCEDURE — 85025 COMPLETE CBC W/AUTO DIFF WBC: CPT

## 2018-08-29 RX ORDER — FUROSEMIDE 10 MG/ML
40 INJECTION INTRAMUSCULAR; INTRAVENOUS
Status: COMPLETED | OUTPATIENT
Start: 2018-08-29 | End: 2018-08-29

## 2018-08-29 RX ORDER — HYDROCODONE BITARTRATE AND ACETAMINOPHEN 10; 325 MG/1; MG/1
1 TABLET ORAL EVERY 6 HOURS PRN
Status: DISCONTINUED | OUTPATIENT
Start: 2018-08-29 | End: 2018-08-30 | Stop reason: HOSPADM

## 2018-08-29 RX ORDER — FUROSEMIDE 10 MG/ML
40 INJECTION INTRAMUSCULAR; INTRAVENOUS 2 TIMES DAILY
Status: DISCONTINUED | OUTPATIENT
Start: 2018-08-29 | End: 2018-08-30 | Stop reason: HOSPADM

## 2018-08-29 RX ORDER — METOPROLOL SUCCINATE 50 MG/1
50 TABLET, EXTENDED RELEASE ORAL DAILY
Status: DISCONTINUED | OUTPATIENT
Start: 2018-08-30 | End: 2018-08-30 | Stop reason: HOSPADM

## 2018-08-29 RX ORDER — CLOPIDOGREL BISULFATE 75 MG/1
75 TABLET ORAL DAILY
Status: DISCONTINUED | OUTPATIENT
Start: 2018-08-30 | End: 2018-08-30 | Stop reason: HOSPADM

## 2018-08-29 RX ORDER — AMIODARONE HYDROCHLORIDE 200 MG/1
200 TABLET ORAL DAILY
Status: DISCONTINUED | OUTPATIENT
Start: 2018-08-30 | End: 2018-08-30 | Stop reason: HOSPADM

## 2018-08-29 RX ORDER — ASPIRIN 325 MG
325 TABLET, DELAYED RELEASE (ENTERIC COATED) ORAL DAILY
Status: DISCONTINUED | OUTPATIENT
Start: 2018-08-30 | End: 2018-08-30 | Stop reason: HOSPADM

## 2018-08-29 RX ADMIN — FUROSEMIDE 40 MG: 10 INJECTION, SOLUTION INTRAMUSCULAR; INTRAVENOUS at 07:08

## 2018-08-29 RX ADMIN — FUROSEMIDE 40 MG: 10 INJECTION, SOLUTION INTRAMUSCULAR; INTRAVENOUS at 03:08

## 2018-08-29 NOTE — CONSULTS
[facility]  Cardiology  Consult Note    Patient Name: Audrey Oneil Jr.  MRN: [id number]  Admission Date: 8/29/2018  Hospital Length of Stay: 0 days  Code Status: Full Code   Attending Provider: Tristan Barnes MD   Consulting Provider: Jd Castro MD  Primary Care Physician: January Khan MD  Principal Problem:<principal problem not specified>    Patient information was obtained from patient and ER records.     Inpatient consult to Cardiology  Consult performed by: Jd Castro MD  Consult ordered by: Isiah Quan MD        Subjective:     Chief Complaint:  SOB, chest tightness.     HPI:   68 Y/O M with PMH significant for CAD S/P STEMI (s/p PCI LAD 2007), CM EF 10%, remote MI, quit smoking Dec 2015,  PE (2010, s/p 1-yr coumadin), HTN, DLP and s/p ICD for VT, planned for CRT upgrade next week. Patient with recent decline in the past months, underwent CPX with peak VO2 of 7.5ml/kg/min, he has NYHA III HENSLEY at baseline. He is off Lisinopril and Aldactone for hyperkalemia, he is scheduled for CRT upgrade on 9/11  Patient wake up this morning with SOB that he described more than his baseline. He had mild chest tightness in the morning that now resolved.   Doesn't check weight at home, denied missing lasix, denied high salt intake.   In the ER patient /70, HR 70, Sat 92-95%  EKG with old LBBB  Trop 0.038, BNP 71  CXR with RLL interstitial infiltrates             Past Medical History:   Diagnosis Date    Chronic anticoagulation 5/5/2016    Chronic combined systolic and diastolic heart failure 11/26/2012    EF 10-20% on ECHO 2013    Clotting disorder     Coronary artery disease involving native coronary artery of native heart without angina pectoris 11/26/2012    Cath 10- Stents patent non-obstructive disease Cath 11-12015 non-obstructive disease     Diverticulosis of colon     DVT (deep venous thrombosis), unspecified laterality 11/12/2015    Essential  hypertension 11/15/2015    Hyperlipidemia     Hypertensive heart disease with heart failure 5/5/2016    MI (myocardial infarction) 2009    x's 5    Nicotine abuse     Obesity 11/26/2012    Pulmonary embolism 2011    Renal disorder     LAVERNE    Stented coronary artery 11/26/2012    LAD stent placed 10/17/2007        Past Surgical History:   Procedure Laterality Date    APPENDECTOMY      BACK SURGERY      CARDIAC DEFIBRILLATOR PLACEMENT      CARDIAC SURGERY  2007    stent    CARPAL TUNNEL RELEASE Right 04/2018    r knee scope      SPINE SURGERY      TONSILLECTOMY      TRIGGER FINGER RELEASE Right 04/2018    thumb       Review of patient's allergies indicates:   Allergen Reactions    Iodine containing multivitamin Swelling     itching    Keflex [cephalexin] Swelling     eyes    Peaches [peach (prunus persica)] Swelling     eyes    Shellfish containing products Swelling    Fig tree Swelling     itching    Tuberculin spenser test ppd Rash       No current facility-administered medications on file prior to encounter.      Current Outpatient Medications on File Prior to Encounter   Medication Sig    allopurinol (ZYLOPRIM) 100 MG tablet TAKE 1 TABLET(100 MG) BY MOUTH EVERY DAY    amiodarone (PACERONE) 200 MG Tab Take 1 tablet (200 mg total) by mouth once daily.    aspirin (ECOTRIN) 325 MG EC tablet Take 325 mg by mouth once daily.    atorvastatin (LIPITOR) 80 MG tablet TAKE 1 TABLET(80 MG) BY MOUTH EVERY DAY    clopidogrel (PLAVIX) 75 mg tablet Take 1 tablet (75 mg total) by mouth once daily.    diphenhydrAMINE (BENADRYL) 50 MG tablet Take 1 tab at 6 pm & 11 pm on night before your proc, then 1 tab at 6 am on day of proc    furosemide (LASIX) 40 MG tablet TAKE 1 TABLET(40 MG) BY MOUTH EVERY DAY    HYDROcodone-acetaminophen (NORCO)  mg per tablet Take 1 tablet by mouth every 6 (six) hours as needed for Pain.    metoprolol succinate (TOPROL-XL) 50 MG 24 hr tablet TAKE 1 TABLET BY MOUTH EVERY  DAY    docusate sodium (COLACE) 50 MG capsule Take 1 capsule (50 mg total) by mouth 2 (two) times daily. (Patient taking differently: Take 50 mg by mouth 2 (two) times daily as needed. )    predniSONE (DELTASONE) 50 MG Tab Take 1 tab at 6 pm & 11 pm on night before your proc, then 1 tab at 6 am on day of proc     Family History     Problem Relation (Age of Onset)    Cancer Mother, Paternal Grandmother    Colon polyps Father    Diabetes Mother    Heart disease Mother, Father    No Known Problems Sister, Daughter, Son, Sister, Son    Stroke Father        Tobacco Use    Smoking status: Former Smoker     Packs/day: 1.00     Years: 45.00     Pack years: 45.00     Types: Cigarettes     Last attempt to quit: 2005     Years since quittin.7    Smokeless tobacco: Never Used    Tobacco comment: 1-1.5 ppd every day.   Substance and Sexual Activity    Alcohol use: Yes     Comment: occasional    Drug use: No    Sexual activity: No     Review of Systems   Constitution: Negative for chills, decreased appetite, fever, weight gain and weight loss.   HENT: Negative for congestion.    Eyes: Negative for blurred vision.   Cardiovascular: Positive for dyspnea on exertion. Negative for chest pain, irregular heartbeat, palpitations, paroxysmal nocturnal dyspnea and syncope.   Respiratory: Positive for shortness of breath. Negative for cough, hemoptysis and wheezing.    Endocrine: Negative for cold intolerance and heat intolerance.   Skin: Negative.    Musculoskeletal: Negative.    Gastrointestinal: Negative for bloating, abdominal pain, melena, nausea and vomiting.   Genitourinary: Negative for bladder incontinence.   Neurological: Negative.      Objective:     Vital Signs (Most Recent):  Temp: 98.2 °F (36.8 °C) (18 0957)  Pulse: 66 (18 1320)  Resp: 18 (18 1320)  BP: (!) 112/58 (18 1320)  SpO2: 97 % (18 1320) Vital Signs (24h Range):  Temp:  [98.2 °F (36.8 °C)] 98.2 °F (36.8 °C)  Pulse:   [63-78] 66  Resp:  [18] 18  SpO2:  [95 %-97 %] 97 %  BP: (112-122)/(58-72) 112/58     Weight: 102 kg (224 lb 13.9 oz)  Body mass index is 35.22 kg/m².    SpO2: 97 %  O2 Device (Oxygen Therapy): room air    No intake or output data in the 24 hours ending 08/29/18 1506    Lines/Drains/Airways     Peripheral Intravenous Line                 Peripheral IV - Single Lumen 08/29/18 1207 Left Hand less than 1 day                Physical Exam   Constitutional: He is oriented to person, place, and time. He appears well-developed and well-nourished. No distress.   HENT:   Head: Normocephalic and atraumatic.   Eyes: Conjunctivae are normal. Pupils are equal, round, and reactive to light.   Neck: Normal range of motion. Neck supple. No JVD present.   Cardiovascular: Normal rate, regular rhythm, normal heart sounds and intact distal pulses. Exam reveals no gallop and no friction rub.   No murmur heard.  Pulmonary/Chest: Effort normal and breath sounds normal. He has no wheezes. He has no rales.   Abdominal: Soft. Bowel sounds are normal. He exhibits no distension. There is no tenderness. There is no rebound.   Musculoskeletal: Normal range of motion. He exhibits no edema.   Neurological: He is alert and oriented to person, place, and time.   Skin: Skin is warm. He is not diaphoretic.       Assessment and Plan:     * SOB (shortness of breath)    -SOB cannot be explained by Heart failure.  -He is not volume overloaded on exam.  -Given the history of PE and SOB with hypoxia on exam, will need to rule out PE.    Recommendations:  -Place in observation under Hospital medicine, can consult [facility] for assistance in the morning.  -Patient allergic to Iodine, will get VQ scan to rule out PE.  -Trend troponin and EKG.  -Will give lasix 40 IV mgs X 1 in the ER.  -Continue with home medications.     Discussed with Dr Restrepo, Staff HTS.               VTE Risk Mitigation (From admission, onward)        Ordered     IP VTE HIGH RISK PATIENT  Once       08/29/18 1521     Reason for No Pharmacological VTE Prophylaxis  Once      08/29/18 1521          Thank you for your consult. I will sign off. Please contact us if you have any additional questions.    Jd Castro MD  Cardiology   [facility]

## 2018-08-29 NOTE — ED PROVIDER NOTES
Encounter Date: 8/29/2018       History     Chief Complaint   Patient presents with    Chest Pain     tightness in chest and SOB upon waking up this am. heart surg scheduled on 11th.      69yo male with hx chf, cad, htn, VT, ICD here w/ chest tightness that was present when he awoke around 9am.  No cp.  No cough.  No new/worse edema.  States compliant with meds, fluid restriction and low-sodium diet.  Notes felt worse laying down but better when standing up.  sxs did not get worse w/ walking.  He feels less sob than when he awoke but not at his baseline.            Review of patient's allergies indicates:   Allergen Reactions    Iodine containing multivitamin Swelling     itching    Keflex [cephalexin] Swelling     eyes    Peaches [peach (prunus persica)] Swelling     eyes    Shellfish containing products Swelling    Fig tree Swelling     itching    Tuberculin spenser test ppd Rash     Past Medical History:   Diagnosis Date    Chronic anticoagulation 5/5/2016    Chronic combined systolic and diastolic heart failure 11/26/2012    EF 10-20% on ECHO 2013    Clotting disorder     Coronary artery disease involving native coronary artery of native heart without angina pectoris 11/26/2012    Cath 10- Stents patent non-obstructive disease Cath 11-12015 non-obstructive disease     Diverticulosis of colon     DVT (deep venous thrombosis), unspecified laterality 11/12/2015    Essential hypertension 11/15/2015    Hyperlipidemia     Hypertensive heart disease with heart failure 5/5/2016    MI (myocardial infarction) 2009    x's 5    Nicotine abuse     Obesity 11/26/2012    Pulmonary embolism 2011    Renal disorder     LAVERNE    Stented coronary artery 11/26/2012    LAD stent placed 10/17/2007      Past Surgical History:   Procedure Laterality Date    APPENDECTOMY      BACK SURGERY      CARDIAC DEFIBRILLATOR PLACEMENT      CARDIAC SURGERY  2007    stent    CARPAL TUNNEL RELEASE Right 04/2018    r knee  scope      SPINE SURGERY      TONSILLECTOMY      TRIGGER FINGER RELEASE Right 2018    thumb     Family History   Problem Relation Age of Onset    Cancer Mother         colon cancer    Heart disease Mother     Diabetes Mother     Heart disease Father     Stroke Father     Colon polyps Father     Cancer Paternal Grandmother         leukemia    No Known Problems Sister     No Known Problems Daughter     No Known Problems Son     No Known Problems Sister     No Known Problems Son      Social History     Tobacco Use    Smoking status: Former Smoker     Packs/day: 1.00     Years: 45.00     Pack years: 45.00     Types: Cigarettes     Last attempt to quit: 2005     Years since quittin.7    Smokeless tobacco: Never Used    Tobacco comment: 1-1.5 ppd every day.   Substance Use Topics    Alcohol use: No    Drug use: No     Review of Systems   Constitutional: Negative for chills and fever.   HENT: Negative for rhinorrhea and sore throat.    Eyes: Negative for visual disturbance.   Respiratory: Positive for shortness of breath. Negative for cough.    Cardiovascular: Negative for chest pain and palpitations.   Gastrointestinal: Negative for abdominal pain, diarrhea and nausea.   Genitourinary: Negative for dysuria and frequency.   Musculoskeletal: Negative for back pain and neck pain.   Skin: Negative for rash.   Neurological: Negative for syncope and headaches.   Psychiatric/Behavioral: The patient is nervous/anxious.        Physical Exam     Initial Vitals [18 0957]   BP Pulse Resp Temp SpO2   121/72 78 18 98.2 °F (36.8 °C) 96 %      MAP       --         Physical Exam    Constitutional: He appears well-developed and well-nourished. He is not diaphoretic. No distress.   HENT:   Head: Normocephalic and atraumatic.   Right Ear: External ear normal.   Left Ear: External ear normal.   Mouth/Throat: Oropharynx is clear and moist.   Eyes: Conjunctivae are normal. Pupils are equal, round, and  reactive to light. Right eye exhibits no discharge. Left eye exhibits no discharge. No scleral icterus.   Neck: Normal range of motion. Neck supple. No tracheal deviation present.   Cardiovascular: Normal rate, regular rhythm and intact distal pulses. Exam reveals no gallop.    Pulmonary/Chest: Breath sounds normal. No stridor. No respiratory distress. He has no wheezes. He has no rhonchi. He has no rales.   Abdominal: Soft. Bowel sounds are normal. He exhibits no distension. There is no tenderness. There is no rebound.   Musculoskeletal: Normal range of motion. He exhibits edema. He exhibits no tenderness.   Neurological: He is alert and oriented to person, place, and time.   Steady gait  Clear speech   Skin: Skin is warm and dry. No rash noted.   Chronic stasis changes BLE   Psychiatric: He has a normal mood and affect.         ED Course   Procedures  Labs Reviewed   CBC W/ AUTO DIFFERENTIAL - Abnormal; Notable for the following components:       Result Value    RBC 4.53 (*)     Hemoglobin 13.7 (*)     Immature Granulocytes 0.9 (*)     Immature Grans (Abs) 0.06 (*)     All other components within normal limits    Narrative:     LAVENDER SHARED TUBE  08/29/2018  12:26    TROPONIN I - Abnormal; Notable for the following components:    Troponin I 0.038 (*)     All other components within normal limits    Narrative:     LAVENDER SHARED TUBE  08/29/2018  12:26    B-TYPE NATRIURETIC PEPTIDE    Narrative:     LAVENDER SHARED TUBE  08/29/2018  12:26    COMPREHENSIVE METABOLIC PANEL    Narrative:     LAVENDER SHARED TUBE  08/29/2018  12:26    MAGNESIUM    Narrative:     LAVENDER SHARED TUBE  08/29/2018  12:26    PROTIME-INR    Narrative:     LAVENDER SHARED TUBE  08/29/2018  12:26    D DIMER, QUANTITATIVE     EKG Readings: (Independently Interpreted)   Sinus, no acute st elevation, old LBBB, similar to prior       Imaging Results          X-Ray Chest PA And Lateral (Final result)  Result time 08/29/18 11:58:35    Final  result by Vincenzo Bates MD (08/29/18 11:58:35)                 Impression:      Vague new interstitial pneumonitis basilar segment right lower lobe appearing in the interim.      Electronically signed by: Vincenzo Bates MD  Date:    08/29/2018  Time:    11:58             Narrative:    EXAMINATION:  XR CHEST PA AND LATERAL    CLINICAL HISTORY:  Dyspnea, unspecified    TECHNIQUE:  PA and lateral views of the chest were performed.    COMPARISON:  06/15/2018    FINDINGS:  Heart normal size status post left subclavian pacemaker therapy.  New vague interstitial infiltrate, perihilar infrahilar on frontal view, with obscuring posterior CP angle basilar segment infrahilar structures on lateral view.  Mild COPD stable.                                 Medical Decision Making:   Initial Assessment:   Dyspnea/orthopnea, significant cardiac hx as noted, r/o chf, anemia, acs, less likely pe, pna, bronchitis, ptx, other.  Well-appearing and ambulating in no distress.  No chest pain.  ekg similar to baseline.  Labs, cxr, monitor, re-eval.    cxr as noted, unclear cause, low suspicion for infectious etiology given lack of uri sxs.  Trop mildly elevated as prior, w/ nl bnp.  Will consult cardiology for further recs.      Seen by cards - recs iv lasix, obtain v/q scan to r/o pe given focal cxr finding (and has ? Allergy to contrast).  Case d/w [facility] medicine for admit.                        Clinical Impression:   The primary encounter diagnosis was Congestive heart failure, unspecified HF chronicity, unspecified heart failure type. Diagnoses of Chest tightness, Dyspnea, AICD (automatic cardioverter/defibrillator) present, Essential hypertension, and Coronary artery disease involving native coronary artery of native heart without angina pectoris were also pertinent to this visit.                             Isiah Quan MD  08/29/18 [id number]

## 2018-08-29 NOTE — SUBJECTIVE & OBJECTIVE
Past Medical History:   Diagnosis Date    Chronic anticoagulation 5/5/2016    Chronic combined systolic and diastolic heart failure 11/26/2012    EF 10-20% on ECHO 2013    Clotting disorder     Coronary artery disease involving native coronary artery of native heart without angina pectoris 11/26/2012    Cath 10- Stents patent non-obstructive disease Cath 11-12015 non-obstructive disease     Diverticulosis of colon     DVT (deep venous thrombosis), unspecified laterality 11/12/2015    Essential hypertension 11/15/2015    Hyperlipidemia     Hypertensive heart disease with heart failure 5/5/2016    MI (myocardial infarction) 2009    x's 5    Nicotine abuse     Obesity 11/26/2012    Pulmonary embolism 2011    Renal disorder     LAVERNE    Stented coronary artery 11/26/2012    LAD stent placed 10/17/2007        Past Surgical History:   Procedure Laterality Date    APPENDECTOMY      BACK SURGERY      CARDIAC DEFIBRILLATOR PLACEMENT      CARDIAC SURGERY  2007    stent    CARPAL TUNNEL RELEASE Right 04/2018    r knee scope      SPINE SURGERY      TONSILLECTOMY      TRIGGER FINGER RELEASE Right 04/2018    thumb       Review of patient's allergies indicates:   Allergen Reactions    Iodine containing multivitamin Swelling     itching    Keflex [cephalexin] Swelling     eyes    Peaches [peach (prunus persica)] Swelling     eyes    Shellfish containing products Swelling    Fig tree Swelling     itching    Tuberculin spenser test ppd Rash       No current facility-administered medications on file prior to encounter.      Current Outpatient Medications on File Prior to Encounter   Medication Sig    allopurinol (ZYLOPRIM) 100 MG tablet TAKE 1 TABLET(100 MG) BY MOUTH EVERY DAY    amiodarone (PACERONE) 200 MG Tab Take 1 tablet (200 mg total) by mouth once daily.    aspirin (ECOTRIN) 325 MG EC tablet Take 325 mg by mouth once daily.    atorvastatin (LIPITOR) 80 MG tablet TAKE 1 TABLET(80 MG) BY MOUTH  EVERY DAY    clopidogrel (PLAVIX) 75 mg tablet Take 1 tablet (75 mg total) by mouth once daily.    diphenhydrAMINE (BENADRYL) 50 MG tablet Take 1 tab at 6 pm & 11 pm on night before your proc, then 1 tab at 6 am on day of proc    furosemide (LASIX) 40 MG tablet TAKE 1 TABLET(40 MG) BY MOUTH EVERY DAY    HYDROcodone-acetaminophen (NORCO)  mg per tablet Take 1 tablet by mouth every 6 (six) hours as needed for Pain.    metoprolol succinate (TOPROL-XL) 50 MG 24 hr tablet TAKE 1 TABLET BY MOUTH EVERY DAY    docusate sodium (COLACE) 50 MG capsule Take 1 capsule (50 mg total) by mouth 2 (two) times daily. (Patient taking differently: Take 50 mg by mouth 2 (two) times daily as needed. )    predniSONE (DELTASONE) 50 MG Tab Take 1 tab at 6 pm & 11 pm on night before your proc, then 1 tab at 6 am on day of proc     Family History     Problem Relation (Age of Onset)    Cancer Mother, Paternal Grandmother    Colon polyps Father    Diabetes Mother    Heart disease Mother, Father    No Known Problems Sister, Daughter, Son, Sister, Son    Stroke Father        Tobacco Use    Smoking status: Former Smoker     Packs/day: 1.00     Years: 45.00     Pack years: 45.00     Types: Cigarettes     Last attempt to quit: 2005     Years since quittin.7    Smokeless tobacco: Never Used    Tobacco comment: 1-1.5 ppd every day.   Substance and Sexual Activity    Alcohol use: Yes     Comment: occasional    Drug use: No    Sexual activity: No     Review of Systems   Constitution: Negative for chills, decreased appetite, fever, weight gain and weight loss.   HENT: Negative for congestion.    Eyes: Negative for blurred vision.   Cardiovascular: Positive for dyspnea on exertion. Negative for chest pain, irregular heartbeat, palpitations, paroxysmal nocturnal dyspnea and syncope.   Respiratory: Positive for shortness of breath. Negative for cough, hemoptysis and wheezing.    Endocrine: Negative for cold intolerance and heat  intolerance.   Skin: Negative.    Musculoskeletal: Negative.    Gastrointestinal: Negative for bloating, abdominal pain, melena, nausea and vomiting.   Genitourinary: Negative for bladder incontinence.   Neurological: Negative.      Objective:     Vital Signs (Most Recent):  Temp: 98.2 °F (36.8 °C) (08/29/18 0957)  Pulse: 66 (08/29/18 1320)  Resp: 18 (08/29/18 1320)  BP: (!) 112/58 (08/29/18 1320)  SpO2: 97 % (08/29/18 1320) Vital Signs (24h Range):  Temp:  [98.2 °F (36.8 °C)] 98.2 °F (36.8 °C)  Pulse:  [63-78] 66  Resp:  [18] 18  SpO2:  [95 %-97 %] 97 %  BP: (112-122)/(58-72) 112/58     Weight: 102 kg (224 lb 13.9 oz)  Body mass index is 35.22 kg/m².    SpO2: 97 %  O2 Device (Oxygen Therapy): room air    No intake or output data in the 24 hours ending 08/29/18 1506    Lines/Drains/Airways     Peripheral Intravenous Line                 Peripheral IV - Single Lumen 08/29/18 1207 Left Hand less than 1 day                Physical Exam   Constitutional: He is oriented to person, place, and time. He appears well-developed and well-nourished. No distress.   HENT:   Head: Normocephalic and atraumatic.   Eyes: Conjunctivae are normal. Pupils are equal, round, and reactive to light.   Neck: Normal range of motion. Neck supple. No JVD present.   Cardiovascular: Normal rate, regular rhythm, normal heart sounds and intact distal pulses. Exam reveals no gallop and no friction rub.   No murmur heard.  Pulmonary/Chest: Effort normal and breath sounds normal. He has no wheezes. He has no rales.   Abdominal: Soft. Bowel sounds are normal. He exhibits no distension. There is no tenderness. There is no rebound.   Musculoskeletal: Normal range of motion. He exhibits no edema.   Neurological: He is alert and oriented to person, place, and time.   Skin: Skin is warm. He is not diaphoretic.

## 2018-08-29 NOTE — ASSESSMENT & PLAN NOTE
-SOB cannot be explained by Heart failure.  -He is not volume overloaded on exam.  -Given the history of PE and SOB with hypoxia on exam, will need to rule out PE.    Recommendations:  -Place in observation under Hospital medicine, can consult [facility] for assistance in the morning.  -Patient allergic to Iodine, will get VQ scan to rule out PE.  -Trend troponin and EKG.  -Will give lasix 40 IV mgs X 1 in the ER.  -Continue with home medications.     Discussed with Dr Restrepo, Staff [facility].

## 2018-08-29 NOTE — H&P
Hospital Medicine  History and Physical Exam    Team: [facility] Tristan Barnes MD  Admit Date: 8/29/2018  MADHAVI   Principal Problem:  <principal problem not specified>   Patient information was obtained from patient and ER records.   Primary care Physician: January Khan MD  Code status: Full Code    HPI:67yo male with hx chf, cad, htn, VT, ICD here w/ chest tightness that was present when he awoke around 9am.  No cp.  No cough.  No new/worse edema.  States compliant with meds, fluid restriction and low-sodium diet.  Notes felt worse laying down but better when standing up.  sxs did not get worse w/ walking.  He feels less sob than when he awoke but not at his baseline.               Past Medical History: Patient has a past medical history of Chronic anticoagulation (5/5/2016), Chronic combined systolic and diastolic heart failure (11/26/2012), Clotting disorder, Coronary artery disease involving native coronary artery of native heart without angina pectoris (11/26/2012), Diverticulosis of colon, DVT (deep venous thrombosis), unspecified laterality (11/12/2015), Essential hypertension (11/15/2015), Hyperlipidemia, Hypertensive heart disease with heart failure (5/5/2016), MI (myocardial infarction) (2009), Nicotine abuse, Obesity (11/26/2012), Pulmonary embolism (2011), Renal disorder, and Stented coronary artery (11/26/2012).    Past Surgical History: Patient has a past surgical history that includes Back surgery; Appendectomy; Tonsillectomy; r knee scope; Spine surgery; Cardiac surgery (2007); Cardiac defibrillator placement; Carpal tunnel release (Right, 04/2018); Trigger finger release (Right, 04/2018); RELEASE-CARPAL TUNNEL right, right thumb TFR (Right, 4/6/2018); RELEASE-FINGER-TRIGGER right thumb (Right, 4/6/2018); Embolectomy (Right, 11/26/2017); HEART CATH-RIGHT (Right, 7/10/2017); IRRIGATION AND DEBRIDEMENT UPPER EXTREMITY - LEFT INDEX FINGER (Left, 12/8/2016); and HEART CATH-LEFT (Left,  11/13/2015).    Social History: Patient reports that he quit smoking about 12 years ago. His smoking use included cigarettes. He has a 45.00 pack-year smoking history. he has never used smokeless tobacco. He reports that he drinks alcohol. He reports that he does not use drugs.    Family History: family history includes Cancer in his mother and paternal grandmother; Colon polyps in his father; Diabetes in his mother; Heart disease in his father and mother; No Known Problems in his daughter, sister, sister, son, and son; Stroke in his father.    Medications:   Prior to Admission medications    Medication Sig Start Date End Date Taking? Authorizing Provider   allopurinol (ZYLOPRIM) 100 MG tablet TAKE 1 TABLET(100 MG) BY MOUTH EVERY DAY 8/7/18  Yes January Khan MD   amiodarone (PACERONE) 200 MG Tab Take 1 tablet (200 mg total) by mouth once daily. 8/16/18  Yes Shefali Hurst NP   aspirin (ECOTRIN) 325 MG EC tablet Take 325 mg by mouth once daily.   Yes Historical Provider, MD   atorvastatin (LIPITOR) 80 MG tablet TAKE 1 TABLET(80 MG) BY MOUTH EVERY DAY 6/26/18  Yes Breezy Gongora MD   clopidogrel (PLAVIX) 75 mg tablet Take 1 tablet (75 mg total) by mouth once daily. 6/26/18  Yes Breezy Gongora MD   diphenhydrAMINE (BENADRYL) 50 MG tablet Take 1 tab at 6 pm & 11 pm on night before your proc, then 1 tab at 6 am on day of proc 8/16/18  Yes Teofilo Pal MD   furosemide (LASIX) 40 MG tablet TAKE 1 TABLET(40 MG) BY MOUTH EVERY DAY 8/8/18  Yes Edison Marshall MD   HYDROcodone-acetaminophen (NORCO)  mg per tablet Take 1 tablet by mouth every 6 (six) hours as needed for Pain.   Yes Historical Provider, MD   metoprolol succinate (TOPROL-XL) 50 MG 24 hr tablet TAKE 1 TABLET BY MOUTH EVERY DAY 5/22/17  Yes Migue Henry Jr., MD   docusate sodium (COLACE) 50 MG capsule Take 1 capsule (50 mg total) by mouth 2 (two) times daily.  Patient taking differently: Take 50 mg by mouth 2 (two) times daily as  needed.  4/6/18   CANDIDO Novoa   predniSONE (DELTASONE) 50 MG Tab Take 1 tab at 6 pm & 11 pm on night before your proc, then 1 tab at 6 am on day of proc 8/16/18   Teofilo Pal MD       Allergies: Patient is allergic to iodine containing multivitamin; keflex [cephalexin]; peaches [peach (prunus persica)]; shellfish containing products; fig tree; and tuberculin spenser test ppd.    ROS      Pain Scale: 0 /10   Constitutional: no fever or chills  Respiratory: Positive cough and shortness of breath  Cardiovascular: no chest pain or palpitations  Gastrointestinal: no nausea or vomiting, no abdominal pain or change in bowel habits  Genitourinary: no hematuria or dysuria  Integument/Breast: no rash or pruritis  Hematologic/Lymphatic: no easy bruising or lymphadenopathy  Musculoskeletal: no arthralgias or myalgias  Neurological: no seizures or tremors  Behavioral/Psych: no depression or anxiety    PEx  Temp:  [98.2 °F (36.8 °C)]   Pulse:  [63-78]   Resp:  [16-18]   BP: (112-136)/(58-72)   SpO2:  [95 %-97 %]   Body mass index is 35.22 kg/m².         General appearance: no distress,   Mental status: Alert and oriented x 3  HEENT:  conjunctivae/corneas clear, PERRL  Neck: supple, thyroid not enlarged  Pulm:   normal respiratory effort, Fine wheezes  Card: RRR, S1, S2 normal, no murmur, click, rub or gallop  Abd: soft, NT, ND, BS present; no masses, no organomegaly  Ext: no c/c/e  Pulses: 2+, symmetric  Skin: color, texture, turgor normal. No rashes or lesions  Neuro: CN II-XII grossly intact, no focal numbness or weakness, normal strength and tone       No intake or output data in the 24 hours ending 08/29/18 1523  Recent Labs   Lab  08/29/18   1207   WBC  6.33   HGB  13.7*   HCT  42.2   PLT  235     Recent Labs   Lab  08/29/18   1207   NA  142   K  3.7   CL  104   CO2  27   BUN  19   CREATININE  1.1   GLU  95   CALCIUM  9.1   MG  2.2     Recent Labs   Lab  08/29/18   1207   ALKPHOS  113   ALT  39   AST  27    ALBUMIN  3.6   PROT  6.6   BILITOT  0.5   INR  0.9      No results for input(s): POCTGLUCOSE in the last 168 hours.  Recent Labs      08/29/18   1207   TROPONINI  0.038*       No results for input(s): LACTATE in the last 72 hours.     Active Hospital Problems    Diagnosis  POA    Congestive heart failure [I50.9]  Yes      Resolved Hospital Problems   No resolved problems to display.       Overview      Assessment and Plan for Problems addressed today:    Congestive Heart Failure  - No overt signs of CHF with new vague mall infiltrate and normal BNP  - Consider other sources  -VQ pending  - Will diurese with lasix 40 mg BID  - C/W home meds including amidarone      ICD in place        DVT PPx: Early Mabulation

## 2018-08-29 NOTE — NURSING
"Patient removed from VAD Potential membership list due to "8/28/18: Per order of Dr Marshall, pt to follow in HF section upon return as he is not being considered for advanced options."  VAD episode closed. Potential Folder Discarded; no paperwork signed. Will initiate VAD education when or if indicated by provider team.     "

## 2018-08-29 NOTE — HPI
68 Y/O M with PMH significant for CAD S/P STEMI (s/p PCI LAD 2007), CM EF 10%, remote MI, quit smoking Dec 2015,  PE (2010, s/p 1-yr coumadin), HTN, DLP and s/p ICD for VT, planned for CRT upgrade next week. Patient with recent decline in the past months, underwent CPX with peak VO2 of 7.5ml/kg/min, he has NYHA III HENSLEY at baseline. He is off Lisinopril and Aldactone for hyperkalemia, he is scheduled for CRT upgrade on 9/11  Patient wake up this morning with SOB that he described more than his baseline. He had mild chest tightness in the morning that now resolved.   Doesn't check weight at home, denied missing lasix, denied high salt intake.   In the ER patient /70, HR 70, Sat 92-95%  EKG with old LBBB  Trop 0.038, BNP 71  CXR with RLL interstitial infiltrates

## 2018-08-30 VITALS
OXYGEN SATURATION: 96 % | DIASTOLIC BLOOD PRESSURE: 68 MMHG | TEMPERATURE: 97 F | HEIGHT: 67 IN | WEIGHT: 222.88 LBS | HEART RATE: 72 BPM | SYSTOLIC BLOOD PRESSURE: 128 MMHG | BODY MASS INDEX: 34.98 KG/M2 | RESPIRATION RATE: 16 BRPM

## 2018-08-30 LAB
ANION GAP SERPL CALC-SCNC: 14 MMOL/L
BUN SERPL-MCNC: 23 MG/DL
CALCIUM SERPL-MCNC: 9 MG/DL
CHLORIDE SERPL-SCNC: 103 MMOL/L
CO2 SERPL-SCNC: 25 MMOL/L
CREAT SERPL-MCNC: 1.2 MG/DL
EST. GFR  (AFRICAN AMERICAN): >60 ML/MIN/1.73 M^2
EST. GFR  (NON AFRICAN AMERICAN): >60 ML/MIN/1.73 M^2
GLUCOSE SERPL-MCNC: 89 MG/DL
MAGNESIUM SERPL-MCNC: 1.9 MG/DL
PHOSPHATE SERPL-MCNC: 4 MG/DL
POTASSIUM SERPL-SCNC: 3.2 MMOL/L
SODIUM SERPL-SCNC: 142 MMOL/L
TROPONIN I SERPL DL<=0.01 NG/ML-MCNC: 0.04 NG/ML

## 2018-08-30 PROCEDURE — 84484 ASSAY OF TROPONIN QUANT: CPT

## 2018-08-30 PROCEDURE — 36415 COLL VENOUS BLD VENIPUNCTURE: CPT

## 2018-08-30 PROCEDURE — 63600175 PHARM REV CODE 636 W HCPCS: Performed by: INTERNAL MEDICINE

## 2018-08-30 PROCEDURE — 84100 ASSAY OF PHOSPHORUS: CPT

## 2018-08-30 PROCEDURE — 99225 PR SUBSEQUENT OBSERVATION CARE,LEVEL II: CPT | Mod: ,,, | Performed by: INTERNAL MEDICINE

## 2018-08-30 PROCEDURE — G0378 HOSPITAL OBSERVATION PER HR: HCPCS

## 2018-08-30 PROCEDURE — 80048 BASIC METABOLIC PNL TOTAL CA: CPT

## 2018-08-30 PROCEDURE — 83735 ASSAY OF MAGNESIUM: CPT

## 2018-08-30 PROCEDURE — 25000003 PHARM REV CODE 250: Performed by: INTERNAL MEDICINE

## 2018-08-30 RX ADMIN — METOPROLOL SUCCINATE 50 MG: 50 TABLET, EXTENDED RELEASE ORAL at 08:08

## 2018-08-30 RX ADMIN — AMIODARONE HYDROCHLORIDE 200 MG: 200 TABLET ORAL at 08:08

## 2018-08-30 RX ADMIN — FUROSEMIDE 40 MG: 10 INJECTION, SOLUTION INTRAMUSCULAR; INTRAVENOUS at 08:08

## 2018-08-30 RX ADMIN — HYDROCODONE BITARTRATE AND ACETAMINOPHEN 1 TABLET: 10; 325 TABLET ORAL at 01:08

## 2018-08-30 RX ADMIN — ASPIRIN 325 MG: 325 TABLET, DELAYED RELEASE ORAL at 08:08

## 2018-08-30 RX ADMIN — CLOPIDOGREL 75 MG: 75 TABLET, FILM COATED ORAL at 08:08

## 2018-08-30 NOTE — PLAN OF CARE
Discharge planning: Message sent to Dr. Marshall scheduling pool requesting hospital follow up appointment.

## 2018-08-30 NOTE — PLAN OF CARE
Problem: Patient Care Overview  Goal: Plan of Care Review  Outcome: Ongoing (interventions implemented as appropriate)  Pt's VSS, NAD noted. Pt aware of discharge orders and is awaiting ride. Pt denies pain at this time. Pt ambulates independently with even gait frequently around unit. Bed locked in lowest position, side rails upx2, call bell within reach, nonskid socks on pt.

## 2018-08-30 NOTE — MEDICAL/APP STUDENT
Discharge Summary  Hospital Medicine    Attending Provider on Discharge: Tristan Barnes MD  Hospital Medicine Team: [facility] HOSP MED B  Date of Admission:  8/29/2018     Date of Discharge: 8/30/2018   Code status: Full Code    Active Hospital Problems    Diagnosis  POA    *SOB (shortness of breath) [R06.02]  Yes     -SOB cannot be explained by Heart failure.  -He is not volume overloaded on exam.  -Given the history of PE and SOB with hypoxia on exam, will need to rule out PE.    Recommendations:  -Place in observation under Hospital medicine, can consult HTS for assistance in the morning.  -Patient allergic to Iodine, will get VQ scan to rule out PE.  -Trend troponin and EKG.  -Will give lasix 40 IV mgs X 1 in the ER.  -Continue with home medications.     Discussed with Dr Restrepo, Staff HTS.         Congestive heart failure [I50.9]  Yes      Resolved Hospital Problems   No resolved problems to display.        HPI  67yo male with hx chf, cad, htn, VT, ICD here w/ chest tightness that was present when he awoke around 9am.  No cp.  No cough.  No new/worse edema. States he is compliant with meds, fluid restriction and low-sodium diet.  Notes he felt worse laying down but better when standing. Sxs did not get worse w/ walking.  He feels less SOB now than when he woke but not at his baseline.      Hospital Course  69 yo male on background significant for CHF, CAD, STEMI (s/p PCI)  HTN, VT (s/p ICD), and PE (completed anticoagulation course) admitted for dyspnea, possibly associated with CHF. Clinically, the patient did not appear volume overloaded and his BNP was WNL. Last 2D Echo (4/2018) reported an EF 15-20% with diastolic dysfunction. Troponins were mildly elevated at 0.038, but down-trending. EKG was non-ischemic with NSR and LBBB; V/Q scan did not indicate a PE. The patient was managed with IV Lasix and cardiology recommended continuing home medications: amiodarone, ASA, clopidogrel, Toprol.     Recent Labs   Lab   08/29/18   1207   WBC  6.33   HGB  13.7*   HCT  42.2   PLT  235     Recent Labs   Lab  08/29/18   1207  08/30/18   0550   NA  142  142   K  3.7  3.2*   CL  104  103   CO2  27  25   BUN  19  23   CREATININE  1.1  1.2   GLU  95  89   CALCIUM  9.1  9.0   MG  2.2  1.9   PHOS   --   4.0     Recent Labs   Lab  08/29/18   1207   ALKPHOS  113   ALT  39   AST  27   ALBUMIN  3.6   PROT  6.6   BILITOT  0.5   INR  0.9        Recent Labs      08/29/18   1207  08/30/18   0550   TROPONINI  0.038*  0.035*       Procedures: none    Consultants: Cardiology     Current Discharge Medication List      CONTINUE these medications which have NOT CHANGED    Details   allopurinol (ZYLOPRIM) 100 MG tablet TAKE 1 TABLET(100 MG) BY MOUTH EVERY DAY  Qty: 90 tablet, Refills: 0    Associated Diagnoses: Idiopathic chronic gout of left foot without tophus      amiodarone (PACERONE) 200 MG Tab Take 1 tablet (200 mg total) by mouth once daily.  Qty: 135 tablet, Refills: 0    Associated Diagnoses: VT (ventricular tachycardia)      aspirin (ECOTRIN) 325 MG EC tablet Take 325 mg by mouth once daily.      atorvastatin (LIPITOR) 80 MG tablet TAKE 1 TABLET(80 MG) BY MOUTH EVERY DAY  Qty: 90 tablet, Refills: 3      clopidogrel (PLAVIX) 75 mg tablet Take 1 tablet (75 mg total) by mouth once daily.  Qty: 90 tablet, Refills: 3      diphenhydrAMINE (BENADRYL) 50 MG tablet Take 1 tab at 6 pm & 11 pm on night before your proc, then 1 tab at 6 am on day of proc  Qty: 3 tablet, Refills: 0    Associated Diagnoses: Allergy to iodine      furosemide (LASIX) 40 MG tablet TAKE 1 TABLET(40 MG) BY MOUTH EVERY DAY  Qty: 90 tablet, Refills: 0      HYDROcodone-acetaminophen (NORCO)  mg per tablet Take 1 tablet by mouth every 6 (six) hours as needed for Pain.      metoprolol succinate (TOPROL-XL) 50 MG 24 hr tablet TAKE 1 TABLET BY MOUTH EVERY DAY  Qty: 90 tablet, Refills: 3      docusate sodium (COLACE) 50 MG capsule Take 1 capsule (50 mg total) by mouth 2 (two) times  daily.  Qty: 30 capsule, Refills: 0      predniSONE (DELTASONE) 50 MG Tab Take 1 tab at 6 pm & 11 pm on night before your proc, then 1 tab at 6 am on day of proc  Qty: 3 tablet, Refills: 0    Associated Diagnoses: Allergy to iodine             Discharge Diet:cardiac diet with Fluid restriction 1500mL per day    Activity: activity as tolerated    Discharge Condition: Stable    Disposition: Home or Self Care    Tests pending at the time of discharge: none      Time spent  on the discharge of the patient including review of hospital course with the patient, reviewing discharge medications and arranging follow-up care     Discharge examination Patient was seen and examined on the date of discharge and determined to be suitable for discharge.    Discharge plan and follow up:      Future Appointments   Date Time Provider Department Center   9/6/2018 10:00 AM LAB, APPOINTMENT [facility] LAB [facility]   9/6/2018  1:30 PM Breezy Gongora MD [facility]   9/11/2018 10:30 [facility]   9/18/2018  1:40 PM COORDINATED DEVICE CHECK [facility] AR[facility]HY[facility]   9/20/2018  9:15 AM January Khan [facility]   10/23/2018  8:00 AM HOME MONITOR DEVICE CHECK, [facility] AR[facility]   11/16/2018  2:00 PM LAB, APPOINTMENT [facility] LAB P [facility]   11/16/2018  3:30 PM Edison Marshall MD [facility]   1/22/2019  8:00 AM HOME MONITOR DEVICE CHECK, [facility]       Provider  Tristan Barnes MD    Scribe Attestation: I personally scribed for Tristan Barnes MD on 08/30/2018 at 11:35 AM. Electronically signed by jessenia Beaulieu on 08/30/2018 at 11:35 AM.

## 2018-08-30 NOTE — PROGRESS NOTES
Pt discharged and ambulated off unit independently with even gait. VSS, NAD noted. IV removed with catheter intact. Discharge instructions reviewed and pt verbalized understanding.

## 2018-09-02 ENCOUNTER — HOSPITAL ENCOUNTER (EMERGENCY)
Facility: HOSPITAL | Age: 68
Discharge: HOME | End: 2018-09-02
Attending: EMERGENCY MEDICINE
Payer: MEDICARE

## 2018-09-02 VITALS
SYSTOLIC BLOOD PRESSURE: 128 MMHG | TEMPERATURE: 98 F | RESPIRATION RATE: 15 BRPM | DIASTOLIC BLOOD PRESSURE: 75 MMHG | OXYGEN SATURATION: 97 % | HEART RATE: 73 BPM

## 2018-09-02 DIAGNOSIS — G89.29 OTHER CHRONIC PAIN: ICD-10-CM

## 2018-09-02 DIAGNOSIS — S63.501D WRIST SPRAIN, RIGHT, SUBSEQUENT ENCOUNTER: Primary | ICD-10-CM

## 2018-09-02 DIAGNOSIS — Z98.890 HISTORY OF CARPAL TUNNEL RELEASE: ICD-10-CM

## 2018-09-02 PROCEDURE — 99283 EMERGENCY DEPT VISIT LOW MDM: CPT | Mod: ,,, | Performed by: PHYSICIAN ASSISTANT

## 2018-09-02 PROCEDURE — 25000003 PHARM REV CODE 250: Performed by: PHYSICIAN ASSISTANT

## 2018-09-02 PROCEDURE — 99283 EMERGENCY DEPT VISIT LOW MDM: CPT

## 2018-09-02 RX ORDER — GABAPENTIN 100 MG/1
100 CAPSULE ORAL 2 TIMES DAILY
Qty: 10 CAPSULE | Refills: 0 | Status: SHIPPED | OUTPATIENT
Start: 2018-09-02 | End: 2019-01-25

## 2018-09-02 RX ORDER — HYDROCODONE BITARTRATE AND ACETAMINOPHEN 10; 325 MG/1; MG/1
1 TABLET ORAL
Status: COMPLETED | OUTPATIENT
Start: 2018-09-02 | End: 2018-09-02

## 2018-09-02 RX ADMIN — HYDROCODONE BITARTRATE AND ACETAMINOPHEN 1 TABLET: 10; 325 TABLET ORAL at 09:09

## 2018-09-03 ENCOUNTER — HOSPITAL ENCOUNTER (EMERGENCY)
Facility: HOSPITAL | Age: 68
Discharge: HOME | End: 2018-09-03
Attending: EMERGENCY MEDICINE
Payer: MEDICARE

## 2018-09-03 VITALS
SYSTOLIC BLOOD PRESSURE: 139 MMHG | WEIGHT: 224.88 LBS | TEMPERATURE: 99 F | RESPIRATION RATE: 18 BRPM | OXYGEN SATURATION: 97 % | DIASTOLIC BLOOD PRESSURE: 84 MMHG | HEART RATE: 86 BPM | BODY MASS INDEX: 35.29 KG/M2 | HEIGHT: 67 IN

## 2018-09-03 DIAGNOSIS — M43.6 TORTICOLLIS: Primary | ICD-10-CM

## 2018-09-03 LAB — POCT GLUCOSE: 174 MG/DL (ref 70–110)

## 2018-09-03 PROCEDURE — 99283 EMERGENCY DEPT VISIT LOW MDM: CPT | Mod: 25

## 2018-09-03 PROCEDURE — 99283 EMERGENCY DEPT VISIT LOW MDM: CPT | Mod: ,,, | Performed by: EMERGENCY MEDICINE

## 2018-09-03 PROCEDURE — 82962 GLUCOSE BLOOD TEST: CPT

## 2018-09-03 RX ORDER — METAXALONE 800 MG/1
800 TABLET ORAL 3 TIMES DAILY
Qty: 15 TABLET | Refills: 0 | Status: SHIPPED | OUTPATIENT
Start: 2018-09-03 | End: 2018-09-03 | Stop reason: SDUPTHER

## 2018-09-03 RX ORDER — METAXALONE 800 MG/1
800 TABLET ORAL 3 TIMES DAILY
Qty: 15 TABLET | Refills: 0 | Status: SHIPPED | OUTPATIENT
Start: 2018-09-03 | End: 2018-09-08

## 2018-09-03 RX ORDER — PREDNISONE 20 MG/1
40 TABLET ORAL DAILY
Qty: 6 TABLET | Refills: 0 | Status: SHIPPED | OUTPATIENT
Start: 2018-09-03 | End: 2018-09-06

## 2018-09-03 NOTE — ED NOTES
Patient identifiers for Audrey Oneil Jr. 68 y.o. male checked and correct.  Chief Complaint   Patient presents with    Neck Pain     Past Medical History:   Diagnosis Date    Chronic anticoagulation 5/5/2016    Chronic combined systolic and diastolic heart failure 11/26/2012    EF 10-20% on ECHO 2013    Clotting disorder     Coronary artery disease involving native coronary artery of native heart without angina pectoris 11/26/2012    Cath 10- Stents patent non-obstructive disease Cath 11-12015 non-obstructive disease     Diverticulosis of colon     DVT (deep venous thrombosis), unspecified laterality 11/12/2015    Essential hypertension 11/15/2015    Hyperlipidemia     Hypertensive heart disease with heart failure 5/5/2016    MI (myocardial infarction) 2009    x's 5    Nicotine abuse     Obesity 11/26/2012    Pulmonary embolism 2011    Renal disorder     LAVERNE    Stented coronary artery 11/26/2012    LAD stent placed 10/17/2007      Allergies reported:   Review of patient's allergies indicates:   Allergen Reactions    Iodine containing multivitamin Swelling     itching    Keflex [cephalexin] Swelling     eyes    Peaches [peach (prunus persica)] Swelling     eyes    Shellfish containing products Swelling    Fig tree Swelling     itching    Tuberculin spenser test ppd Rash         LOC: Patient is awake, alert, and aware of environment with an appropriate affect. Patient is oriented x 3 and speaking appropriately.  APPEARANCE: Patient resting comfortably and in no acute distress. Patient is clean and well groomed, patient's clothing is properly fastened.  HEENT: c/o HA radiating from neck, pain worse with movement, guarded movement noted to neck. Tenderness to neck and throat.  SKIN: The skin is warm and dry.   MUSKULOSKELETAL: Patient is moving all extremities well, right arm wrist splint noted.  RESPIRATORY: Airway is open and patent. Respirations are spontaneous and non-labored with normal  effort and rate.  NEUROLOGICAL: Normal sensation in all extremities when touched with finger.

## 2018-09-03 NOTE — ED TRIAGE NOTES
"Audrey Oneil Jr., a 68 y.o. male presents to the ED to intake 3.       Chief Complaint   Patient presents with    Wrist Pain     Pt reports right wrist pain "for months". Pt states that he can't hold cups or forks.     Review of patient's allergies indicates:   Allergen Reactions    Iodine containing multivitamin Swelling     itching    Keflex [cephalexin] Swelling     eyes    Peaches [peach (prunus persica)] Swelling     eyes    Shellfish containing products Swelling    Fig tree Swelling     itching    Tuberculin spenser test ppd Rash     Past Medical History:   Diagnosis Date    Chronic anticoagulation 5/5/2016    Chronic combined systolic and diastolic heart failure 11/26/2012    EF 10-20% on ECHO 2013    Clotting disorder     Coronary artery disease involving native coronary artery of native heart without angina pectoris 11/26/2012    Cath 10- Stents patent non-obstructive disease Cath 11-12015 non-obstructive disease     Diverticulosis of colon     DVT (deep venous thrombosis), unspecified laterality 11/12/2015    Essential hypertension 11/15/2015    Hyperlipidemia     Hypertensive heart disease with heart failure 5/5/2016    MI (myocardial infarction) 2009    x's 5    Nicotine abuse     Obesity 11/26/2012    Pulmonary embolism 2011    Renal disorder     LAVERNE    Stented coronary artery 11/26/2012    LAD stent placed 10/17/2007        "

## 2018-09-03 NOTE — ED NOTES
At discharge pt AOX3, resp even/unlabored, skin w/d, BRUNSON, no change in patient status from initial assessment, NAD at this time.

## 2018-09-03 NOTE — ED PROVIDER NOTES
Encounter Date: 9/3/2018    SCRIBE #1 NOTE: I, Alejandra Chowdary, am scribing for, and in the presence of,  Dr. Byrnes. I have scribed the following portions of the note - Other sections scribed: HPI, PE, ROS.       History     Chief Complaint   Patient presents with    Neck Pain     Time patient was seen by the provider: 4:37 PM      The patient is a 68 y.o. male with co-morbidities including: CHF, CAD, MI, HTN, HLD, PE, who presents to the ED with a complaint of neck pain that presented this morning. Patient states pain worsens with movement of the neck, limited with ROM. He also notes of associated sharp headaches. Denies numbness/tingling in upper or lower extremities.       The history is provided by the patient and medical records.     Review of patient's allergies indicates:   Allergen Reactions    Iodine containing multivitamin Swelling     itching    Keflex [cephalexin] Swelling     eyes    Peaches [peach (prunus persica)] Swelling     eyes    Shellfish containing products Swelling    Fig tree Swelling     itching    Tuberculin spenser test ppd Rash     Past Medical History:   Diagnosis Date    Chronic anticoagulation 5/5/2016    Chronic combined systolic and diastolic heart failure 11/26/2012    EF 10-20% on ECHO 2013    Clotting disorder     Coronary artery disease involving native coronary artery of native heart without angina pectoris 11/26/2012    Cath 10- Stents patent non-obstructive disease Cath 11-12015 non-obstructive disease     Diverticulosis of colon     DVT (deep venous thrombosis), unspecified laterality 11/12/2015    Essential hypertension 11/15/2015    Hyperlipidemia     Hypertensive heart disease with heart failure 5/5/2016    MI (myocardial infarction) 2009    x's 5    Nicotine abuse     Obesity 11/26/2012    Pulmonary embolism 2011    Renal disorder     LAVERNE    Stented coronary artery 11/26/2012    LAD stent placed 10/17/2007      Past Surgical History:    Procedure Laterality Date    APPENDECTOMY      BACK SURGERY      CARDIAC DEFIBRILLATOR PLACEMENT      CARDIAC SURGERY  2007    stent    CARPAL TUNNEL RELEASE Right 2018    r knee scope      SPINE SURGERY      TONSILLECTOMY      TRIGGER FINGER RELEASE Right 2018    thumb     Family History   Problem Relation Age of Onset    Cancer Mother         colon cancer    Heart disease Mother     Diabetes Mother     Heart disease Father     Stroke Father     Colon polyps Father     Cancer Paternal Grandmother         leukemia    No Known Problems Sister     No Known Problems Daughter     No Known Problems Son     No Known Problems Sister     No Known Problems Son      Social History     Tobacco Use    Smoking status: Former Smoker     Packs/day: 1.00     Years: 45.00     Pack years: 45.00     Types: Cigarettes     Last attempt to quit: 2005     Years since quittin.7    Smokeless tobacco: Never Used    Tobacco comment: 1-1.5 ppd every day.   Substance Use Topics    Alcohol use: No     Frequency: Never     Comment: occasional    Drug use: No     Review of Systems   Constitutional: Negative.    HENT: Negative.    Eyes: Negative.    Respiratory: Negative.    Cardiovascular: Negative.    Gastrointestinal: Negative.    Endocrine: Negative.    Genitourinary: Negative.    Musculoskeletal: Positive for neck pain.   Skin: Negative.    Allergic/Immunologic: Negative.    Neurological: Positive for headaches. Negative for numbness.   Hematological: Negative.    Psychiatric/Behavioral: Negative.    All other systems reviewed and are negative.      Physical Exam     Initial Vitals [18 1635]   BP Pulse Resp Temp SpO2   139/84 86 18 98.5 °F (36.9 °C) 97 %      MAP       --         Physical Exam    Nursing note and vitals reviewed.  Constitutional: He appears well-developed and well-nourished. No distress.   HENT:   Head: Normocephalic and atraumatic.   Eyes: Conjunctivae are normal.    Cardiovascular: Normal rate, regular rhythm and normal heart sounds.   Pulmonary/Chest: Breath sounds normal. No respiratory distress.   Abdominal: Soft. Bowel sounds are normal. He exhibits no distension. There is no tenderness.   Musculoskeletal: He exhibits no edema.   Neurological: He is alert and oriented to person, place, and time. He has normal strength. No sensory deficit.   Skin: Skin is warm and dry. Capillary refill takes less than 2 seconds.   Psychiatric: He has a normal mood and affect.         ED Course   Procedures  Labs Reviewed   POCT GLUCOSE - Abnormal; Notable for the following components:       Result Value    POCT Glucose 174 (*)     All other components within normal limits          Imaging Results    None          Medical Decision Making:   History:   Old Medical Records: I decided to obtain old medical records.  Clinical Tests:   Lab Tests: Ordered and Reviewed  ED Management:  Chornic pain pt. Most c/w muscular torticollis. Will refer to primary care.            Scribe Attestation:   Scribe #1: I performed the above scribed service and the documentation accurately describes the services I performed. I attest to the accuracy of the note.               Clinical Impression:   The encounter diagnosis was Torticollis.      Disposition:   Disposition: Discharged  Condition: Stable                        Sree Byrnes MD  09/03/18 6216

## 2018-09-03 NOTE — ED PROVIDER NOTES
"Encounter Date: 9/2/2018       History     Chief Complaint   Patient presents with    Wrist Pain     Pt reports right wrist pain "for months". Pt states that he can't hold cups or forks.     Mr Oneil is a 68YOWM who returns to ED for continued R wrist pain; PMHx of combined CHF with 20%EF, CAD on Plavix 75 mg, hx of MI, HTN, HLD, hx of PE, diverticulosis, and hx of gout. Patient has been seen multiple times in [facility] ED within the past few weeks for continued R wrist pain after fall several weeks prior. He also underwent R carpal tunnel release two months ago. Home Rx [place] is not sufficiently relieving pain. He uses ice intermittently. He wears brace as instructed. At last visit he was given Medrol dose pack which alleviated symptoms for several days. He has not yet contacted hand surgeon clinic to be evaluated. He denies subsequent trauma to wrist, paresthesias in RUE, fever nor chills. He endorses weakness in right hand and wrist, but states this is continued and has not increased.          Review of patient's allergies indicates:   Allergen Reactions    Iodine containing multivitamin Swelling     itching    Keflex [cephalexin] Swelling     eyes    Peaches [peach (prunus persica)] Swelling     eyes    Shellfish containing products Swelling    Fig tree Swelling     itching    Tuberculin spenser test ppd Rash     Past Medical History:   Diagnosis Date    Chronic anticoagulation 5/5/2016    Chronic combined systolic and diastolic heart failure 11/26/2012    EF 10-20% on ECHO 2013    Clotting disorder     Coronary artery disease involving native coronary artery of native heart without angina pectoris 11/26/2012    Cath 10- Stents patent non-obstructive disease Cath 11-12015 non-obstructive disease     Diverticulosis of colon     DVT (deep venous thrombosis), unspecified laterality 11/12/2015    Essential hypertension 11/15/2015    Hyperlipidemia     Hypertensive heart disease with heart failure " 2016    MI (myocardial infarction) 2009    x's 5    Nicotine abuse     Obesity 2012    Pulmonary embolism     Renal disorder     LAVERNE    Stented coronary artery 2012    LAD stent placed 10/17/2007      Past Surgical History:   Procedure Laterality Date    APPENDECTOMY      BACK SURGERY      CARDIAC DEFIBRILLATOR PLACEMENT      CARDIAC SURGERY  2007    stent    CARPAL TUNNEL RELEASE Right 2018    r knee scope      SPINE SURGERY      TONSILLECTOMY      TRIGGER FINGER RELEASE Right 2018    thumb     Family History   Problem Relation Age of Onset    Cancer Mother         colon cancer    Heart disease Mother     Diabetes Mother     Heart disease Father     Stroke Father     Colon polyps Father     Cancer Paternal Grandmother         leukemia    No Known Problems Sister     No Known Problems Daughter     No Known Problems Son     No Known Problems Sister     No Known Problems Son      Social History     Tobacco Use    Smoking status: Former Smoker     Packs/day: 1.00     Years: 45.00     Pack years: 45.00     Types: Cigarettes     Last attempt to quit: 2005     Years since quittin.7    Smokeless tobacco: Never Used    Tobacco comment: 1-1.5 ppd every day.   Substance Use Topics    Alcohol use: No     Frequency: Never     Comment: occasional    Drug use: No     Review of Systems   Constitutional: Negative for appetite change, chills, diaphoresis, fatigue and fever.   HENT: Negative for sore throat and trouble swallowing.    Respiratory: Negative for shortness of breath.    Cardiovascular: Negative for chest pain and palpitations.   Gastrointestinal: Negative for abdominal pain and nausea.   Musculoskeletal: Positive for arthralgias. Negative for back pain, joint swelling, myalgias and neck pain.   Skin: Negative for pallor, rash and wound.   Neurological: Positive for weakness (endorses continued weakness of left hand and wrist). Negative for dizziness,  tremors and numbness.   Psychiatric/Behavioral: Negative for agitation and confusion.       Physical Exam     Initial Vitals [09/02/18 2038]   BP Pulse Resp Temp SpO2   128/75 73 15 98.4 °F (36.9 °C) 97 %      MAP       --         Physical Exam    Vitals reviewed.  Constitutional: He appears well-developed and well-nourished. He is not diaphoretic. No distress.   Chronically-ill appearing male in NAD, VSS, afebrile, 97% on RA.     HENT:   Head: Normocephalic and atraumatic.   Right Ear: External ear normal.   Left Ear: External ear normal.   Nose: Nose normal.   Mouth/Throat: Oropharynx is clear and moist. No oropharyngeal exudate.   Eyes: Conjunctivae and EOM are normal. Pupils are equal, round, and reactive to light.   Neck: Normal range of motion. Neck supple.   Cardiovascular: Normal rate, regular rhythm and intact distal pulses.   Murmur heard.  Pulmonary/Chest: Breath sounds normal. No respiratory distress.   Abdominal: Soft. There is no tenderness.   Musculoskeletal: Normal range of motion. He exhibits edema and tenderness.   Mild erythema to styloid process of ulna with continued minimal edema to right hand and wrist. This is consistent with prior, serial examinations of right wrist. Right hand and wrist NVI with 4/5  strength. R Radial pulse full.    Neurological: He is alert and oriented to person, place, and time. No cranial nerve deficit or sensory deficit.   Skin: Skin is warm and dry. Capillary refill takes less than 2 seconds. No rash and no abscess noted. No pallor.   Psychiatric: He has a normal mood and affect. His behavior is normal. Judgment and thought content normal.         ED Course   Procedures  Labs Reviewed - No data to display       Imaging Results    None          Medical Decision Making:   Initial Assessment:   Patient returns for continued R wrist pain from fall 6 weeks prior, negative imaging, has not made appointment for hand clinic (R carpal tunnel release two months ago). No  further trauma, no constitutional symptoms. RUE NVI.  Differential Diagnosis:   DDX chronic pain, CRPS, neuropathy. Physical exam and history taking lower clinical suspicion for DVT, vascular compromise, acute fracture, cellulitis, tenosynovitis, osteomyelitis.   ED Management:  I discussed with the patient at length the need to be seen by established hand surgeon for continued R wrist pain complaints, especially given post-operative window. Prior workups have been unremarkable in establishing emergent etiology of pain. No repeat imaging of wrist is indicated at this time. I recommended increased conservative measures such as ice and elevation. Given ambulatory referral to expedite appointment process with hand clinic. Given home dose [place] in ED with short course Neurontin. Patient agreed to plan of care and voiced understanding. Discharged in stable condition with strict ED return precautions.    Aliyah Hurst PA-C  09/02/2018    I discussed the following case, diagnosis and plan of care with attending physician.                        Clinical Impression:   The primary encounter diagnosis was Wrist sprain, right, subsequent encounter. Diagnoses of Other chronic pain and History of carpal tunnel release were also pertinent to this visit.      Disposition:   Disposition: Discharged  Condition: Stable                        Aliyah Hurst PA-C  09/03/18 0023

## 2018-09-06 ENCOUNTER — OFFICE VISIT (OUTPATIENT)
Dept: TRANSPLANT | Facility: CLINIC | Age: 68
End: 2018-09-06
Payer: MEDICARE

## 2018-09-06 ENCOUNTER — LAB VISIT (OUTPATIENT)
Dept: LAB | Facility: HOSPITAL | Age: 68
End: 2018-09-06
Attending: INTERNAL MEDICINE
Payer: MEDICARE

## 2018-09-06 VITALS
DIASTOLIC BLOOD PRESSURE: 73 MMHG | HEART RATE: 63 BPM | SYSTOLIC BLOOD PRESSURE: 123 MMHG | HEIGHT: 67 IN | WEIGHT: 230.81 LBS | BODY MASS INDEX: 36.22 KG/M2

## 2018-09-06 DIAGNOSIS — I25.5 CARDIOMYOPATHY, ISCHEMIC: Chronic | ICD-10-CM

## 2018-09-06 DIAGNOSIS — Z79.899 HIGH RISK MEDICATIONS (NOT ANTICOAGULANTS) LONG-TERM USE: ICD-10-CM

## 2018-09-06 DIAGNOSIS — Z86.718 HISTORY OF DVT (DEEP VEIN THROMBOSIS): Chronic | ICD-10-CM

## 2018-09-06 DIAGNOSIS — I25.10 CORONARY ARTERY DISEASE INVOLVING NATIVE CORONARY ARTERY OF NATIVE HEART WITHOUT ANGINA PECTORIS: ICD-10-CM

## 2018-09-06 DIAGNOSIS — I50.42 CHRONIC COMBINED SYSTOLIC AND DIASTOLIC HEART FAILURE: Primary | Chronic | ICD-10-CM

## 2018-09-06 DIAGNOSIS — D63.8 ANEMIA OF CHRONIC DISEASE: ICD-10-CM

## 2018-09-06 DIAGNOSIS — I50.41 ACUTE COMBINED SYSTOLIC AND DIASTOLIC CONGESTIVE HEART FAILURE: ICD-10-CM

## 2018-09-06 DIAGNOSIS — I44.7 LBBB (LEFT BUNDLE BRANCH BLOCK): ICD-10-CM

## 2018-09-06 DIAGNOSIS — Z95.810 AICD (AUTOMATIC CARDIOVERTER/DEFIBRILLATOR) PRESENT: ICD-10-CM

## 2018-09-06 DIAGNOSIS — N18.30 CKD (CHRONIC KIDNEY DISEASE), STAGE III: ICD-10-CM

## 2018-09-06 DIAGNOSIS — E66.01 SEVERE OBESITY (BMI 35.0-39.9) WITH COMORBIDITY: Chronic | ICD-10-CM

## 2018-09-06 DIAGNOSIS — Z95.810 ICD (IMPLANTABLE CARDIOVERTER-DEFIBRILLATOR) IN PLACE: ICD-10-CM

## 2018-09-06 LAB
ANION GAP SERPL CALC-SCNC: 9 MMOL/L
APTT BLDCRRT: 22.7 SEC
BASOPHILS # BLD AUTO: 0.02 K/UL
BASOPHILS NFR BLD: 0.2 %
BUN SERPL-MCNC: 34 MG/DL
CALCIUM SERPL-MCNC: 9.3 MG/DL
CHLORIDE SERPL-SCNC: 105 MMOL/L
CO2 SERPL-SCNC: 26 MMOL/L
CREAT SERPL-MCNC: 1.3 MG/DL
DIFFERENTIAL METHOD: ABNORMAL
EOSINOPHIL # BLD AUTO: 0 K/UL
EOSINOPHIL NFR BLD: 0 %
ERYTHROCYTE [DISTWIDTH] IN BLOOD BY AUTOMATED COUNT: 14.6 %
EST. GFR  (AFRICAN AMERICAN): >60 ML/MIN/1.73 M^2
EST. GFR  (NON AFRICAN AMERICAN): 56.9 ML/MIN/1.73 M^2
GLUCOSE SERPL-MCNC: 159 MG/DL
HCT VFR BLD AUTO: 40.1 %
HGB BLD-MCNC: 12.6 G/DL
IMM GRANULOCYTES # BLD AUTO: 0.15 K/UL
IMM GRANULOCYTES NFR BLD AUTO: 1.5 %
INR PPP: 0.9
LYMPHOCYTES # BLD AUTO: 1.8 K/UL
LYMPHOCYTES NFR BLD: 17.7 %
MCH RBC QN AUTO: 30.1 PG
MCHC RBC AUTO-ENTMCNC: 31.4 G/DL
MCV RBC AUTO: 96 FL
MONOCYTES # BLD AUTO: 0.5 K/UL
MONOCYTES NFR BLD: 5.2 %
NEUTROPHILS # BLD AUTO: 7.5 K/UL
NEUTROPHILS NFR BLD: 75.4 %
NRBC BLD-RTO: 0 /100 WBC
PLATELET # BLD AUTO: 357 K/UL
PMV BLD AUTO: 10.2 FL
POTASSIUM SERPL-SCNC: 4.2 MMOL/L
PROTHROMBIN TIME: 9.8 SEC
RBC # BLD AUTO: 4.19 M/UL
SODIUM SERPL-SCNC: 140 MMOL/L
WBC # BLD AUTO: 10 K/UL

## 2018-09-06 PROCEDURE — 99215 OFFICE O/P EST HI 40 MIN: CPT | Mod: S$PBB,,, | Performed by: INTERNAL MEDICINE

## 2018-09-06 PROCEDURE — 3078F DIAST BP <80 MM HG: CPT | Mod: CPTII,,, | Performed by: INTERNAL MEDICINE

## 2018-09-06 PROCEDURE — 99213 OFFICE O/P EST LOW 20 MIN: CPT | Mod: PBBFAC | Performed by: INTERNAL MEDICINE

## 2018-09-06 PROCEDURE — 85730 THROMBOPLASTIN TIME PARTIAL: CPT

## 2018-09-06 PROCEDURE — 99499 UNLISTED E&M SERVICE: CPT | Mod: S$GLB,,, | Performed by: INTERNAL MEDICINE

## 2018-09-06 PROCEDURE — 85025 COMPLETE CBC W/AUTO DIFF WBC: CPT

## 2018-09-06 PROCEDURE — 1101F PT FALLS ASSESS-DOCD LE1/YR: CPT | Mod: CPTII,,, | Performed by: INTERNAL MEDICINE

## 2018-09-06 PROCEDURE — 85610 PROTHROMBIN TIME: CPT

## 2018-09-06 PROCEDURE — 36415 COLL VENOUS BLD VENIPUNCTURE: CPT

## 2018-09-06 PROCEDURE — 99999 PR PBB SHADOW E&M-EST. PATIENT-LVL III: CPT | Mod: PBBFAC,,, | Performed by: INTERNAL MEDICINE

## 2018-09-06 PROCEDURE — 3074F SYST BP LT 130 MM HG: CPT | Mod: CPTII,,, | Performed by: INTERNAL MEDICINE

## 2018-09-06 PROCEDURE — 80048 BASIC METABOLIC PNL TOTAL CA: CPT

## 2018-09-06 NOTE — PROGRESS NOTES
Subjective:     HPI:  Mr. Oneil is a very pleasant 67 y.o. male with a hx of CAD s/p STEMI (s/p PCI LAD 2007), CHF/ICM and remote MI, quit smoking Dec 2015,  PE (2010, s/p 1-yr coumadin), HTN, DLP and s/p ICD [facility] placement due to VT who comes for a regular follow-up. I saw him back in 6/12/17 and had RHC 7/10/17 (see below). He has been declining over the last several months and most recent CPX (2/15/17) that showed low Peak VO2 of 7.5ml/kg/min but AT was not attained and RER was only 0.67 (poor effort). Most recent 2D echo showed severely depressed LV function with an EF=10-15%, LV with a LVEDD of 5.9  cm, normal RV size and function. Off lisinopril and aldactone because for hyperkalemia   he is scheduled to undergo CRT upgrade with Dr. Elkins next month.     HENSLEY FC II-III NYHA        RHC 7/2017:  RA: 5/4 (1)  RV: 31/-6  PW:  (6)  PA: 32/2 (15)  AO: 120/64 (91)  PA_SAT: 64    CONDITION 1:  FICKCI: 2.1400  FICKCO: 4.6600    Past Medical History:   Diagnosis Date    AICD (automatic cardioverter/defibrillator) present 12/13/2015      Chronic anticoagulation 5/5/2016    Chronic combined systolic and diastolic heart failure 11/26/2012    EF 10-20% on ECHO 2013    Clotting disorder     Coronary artery disease involving native coronary artery of native heart without angina pectoris 11/26/2012    Cath 10- Stents patent non-obstructive disease Cath 11-12015 non-obstructive disease     Diverticulosis of colon     DVT (deep venous thrombosis), unspecified laterality 11/12/2015    Essential hypertension 11/15/2015    Hyperlipidemia     Hypertensive heart disease with heart failure 5/5/2016    MI (myocardial infarction) 2009    x's 5    Nicotine abuse     Obesity 11/26/2012    Pulmonary embolism 2011    Renal disorder     LAVERNE    Stented coronary artery 11/26/2012    LAD stent placed 10/17/2007      Past Surgical History:   Procedure Laterality Date    APPENDECTOMY      BACK SURGERY    "   CARDIAC DEFIBRILLATOR PLACEMENT      CARDIAC SURGERY  2007    stent    CARPAL TUNNEL RELEASE Right 04/2018    r knee scope      SPINE SURGERY      TONSILLECTOMY      TRIGGER FINGER RELEASE Right 04/2018    thumb       Review of Systems   Constitution: Negative. Negative for chills, decreased appetite, diaphoresis, fever, weakness, malaise/fatigue, night sweats, weight gain and weight loss.   Eyes: Negative.    Cardiovascular: Positive for dyspnea on exertion. Negative for chest pain, claudication, cyanosis, irregular heartbeat, leg swelling, near-syncope, orthopnea, palpitations, paroxysmal nocturnal dyspnea and syncope.   Respiratory: Negative for cough, hemoptysis and shortness of breath.    Endocrine: Negative.    Hematologic/Lymphatic: Negative.    Skin: Negative for color change, dry skin and nail changes.   Musculoskeletal: Negative.    Gastrointestinal: Negative.    Genitourinary: Negative.        Objective:   Blood pressure 123/73, pulse 63, height 5' 7" (1.702 m), weight 104.7 kg (230 lb 13.2 oz).body mass index is 36.15 kg/m².  Physical Exam   Constitutional: He appears well-developed.   BP (!) 68/50 (BP Location: Left arm, Patient Position: Sitting, BP Method: Large (Automatic))   Pulse 64   Ht 5' 7" (1.702 m)   Wt 105.8 kg (233 lb 4 oz)   SpO2 97%   BMI 36.53 kg/m²      HENT:   Head: Normocephalic.   Neck: No JVD present. Carotid bruit is not present.   Cardiovascular: Regular rhythm and normal pulses. PMI is displaced. Exam reveals no gallop and no S3.   No murmur heard.  Pulmonary/Chest: Effort normal and breath sounds normal. No respiratory distress. He has no wheezes. He has no rales.   Abdominal: Soft. Bowel sounds are normal. He exhibits no distension. There is no tenderness. There is no rebound.   Musculoskeletal: He exhibits no edema.   Neurological: He is alert.   Skin: Skin is warm.   Vitals reviewed.      Labs:    Chemistry        Component Value Date/Time     08/30/2018 " 0550    K 3.2 (L) 08/30/2018 0550     08/30/2018 0550    CO2 25 08/30/2018 0550    BUN 23 08/30/2018 0550    CREATININE 1.2 08/30/2018 0550    GLU 89 08/30/2018 0550        Component Value Date/Time    CALCIUM 9.0 08/30/2018 0550    ALKPHOS 113 08/29/2018 1207    AST 27 08/29/2018 1207    ALT 39 08/29/2018 1207    BILITOT 0.5 08/29/2018 1207    ESTGFRAFRICA >60.0 08/30/2018 0550    EGFRNONAA >60.0 08/30/2018 0550          Magnesium   Date Value Ref Range Status   08/30/2018 1.9 1.6 - 2.6 mg/dL Final     Lab Results   Component Value Date    WBC 6.33 08/29/2018    HGB 13.7 (L) 08/29/2018    HCT 42.2 08/29/2018     08/29/2018     Lab Results   Component Value Date    INR 0.9 08/29/2018    INR 1.1 06/26/2018    INR 1.0 06/15/2018     BNP   Date Value Ref Range Status   08/29/2018 79 0 - 99 pg/mL Final     Comment:     Values of less than 100 pg/ml are consistent with non-CHF populations.   06/15/2018 51 0 - 99 pg/mL Final     Comment:     Values of less than 100 pg/ml are consistent with non-CHF populations.   04/19/2018 89 0 - 99 pg/mL Final     Comment:     Values of less than 100 pg/ml are consistent with non-CHF populations.     No results found for: LDH  No results found for this or any previous visit.  No results found for this or any previous visit.    Assessment:      1. Chronic combined systolic and diastolic heart failure    2. Cardiomyopathy, ischemic    3. Coronary artery disease involving native coronary artery of native heart without angina pectoris    4. ICD (implantable cardioverter-defibrillator) in place    5. LBBB (left bundle branch block)    6. CKD (chronic kidney disease), stage III    7. History of DVT (deep vein thrombosis)    8. Anemia of chronic disease    9. Severe obesity (BMI 35.0-39.9) with comorbidity        Plan:   LBBB and a great deal of dyssyncrony CRT  nesxt 2 weeks    68 y/o male with ICMP, HFrEF stage D with NYHA class II symptoms (cardiomems)  Continue current HF  regimen  Recommend 2 gram sodium restriction and 1500cc fluid restriction.  Encourage physical activity with graded exercise program.  Requested patient to weigh themselves daily, and to notify us if their weight increases by more than 3 lbs in 1 day or 5 lbs in 1 week.      RTC 2 months   Breezy Gongora MD

## 2018-09-06 NOTE — LETTER
September 6, 2018        Elvin Simms Jr.  [address]  [place] [address]  Phone: [phone]  Fax: [phone]             [facility]  [address]  [facility] [address]  Phone: [phone]   Patient: Audrey Oneil Jr.   MR Number: [id number]   YOB: 1950   Date of Visit: 9/6/2018       Dear Dr. Elvin Simms Jr.    Thank you for referring Audrey Oneil to me for evaluation. Attached you will find relevant portions of my assessment and plan of care.    If you have questions, please do not hesitate to call me. I look forward to following Audrey Oneil along with you.    Sincerely,    Breezy Gongora MD    Enclosure    If you would like to receive this communication electronically, please contact [email or web address] or [phone] to request [employer] access.    [employer] is a tool which provides read-only access to select patient information with whom you have a relationship. Its easy to use and provides real time access to review your patients record including encounter summaries, notes, results, and demographic information.    If you feel you have received this communication in error or would no longer like to receive these types of communications, please e-mail [email or web address]

## 2018-09-09 ENCOUNTER — HOSPITAL ENCOUNTER (EMERGENCY)
Facility: HOSPITAL | Age: 68
Discharge: LEFT BEFORE DISCHARGE | End: 2018-09-09
Attending: EMERGENCY MEDICINE
Payer: MEDICARE

## 2018-09-09 VITALS
RESPIRATION RATE: 18 BRPM | HEART RATE: 76 BPM | TEMPERATURE: 98 F | HEIGHT: 67 IN | BODY MASS INDEX: 36.5 KG/M2 | WEIGHT: 232.56 LBS | DIASTOLIC BLOOD PRESSURE: 70 MMHG | SYSTOLIC BLOOD PRESSURE: 149 MMHG | OXYGEN SATURATION: 97 %

## 2018-09-09 DIAGNOSIS — R07.0 THROAT PAIN: Primary | ICD-10-CM

## 2018-09-09 DIAGNOSIS — R68.89 THROAT CONGESTION: ICD-10-CM

## 2018-09-09 PROCEDURE — 99282 EMERGENCY DEPT VISIT SF MDM: CPT

## 2018-09-09 PROCEDURE — 99284 EMERGENCY DEPT VISIT MOD MDM: CPT | Mod: ,,, | Performed by: NURSE PRACTITIONER

## 2018-09-09 NOTE — ED PROVIDER NOTES
Encounter Date: 9/9/2018       History     Chief Complaint   Patient presents with    URI     coughing up green, sore throat, neck pain congestion     HPI   This is a 62-year-old male with past medical history significant for heart failure, chronic anticoagulation, clotting disorder, coronary artery disease, DVT, hypertension, MI, and PE and diverticulosis who presents to emergency department today for evaluation of throat pain and congestion.  He reports that his symptoms have been present for 3 weeks.  He has had multiple emergency department visits for similar concerns.  He denies fever chills. Denies difficulty breathing or swallowing.  He denies chest pain or shortness of breath.    Review of patient's allergies indicates:   Allergen Reactions    Iodine containing multivitamin Swelling     itching    Keflex [cephalexin] Swelling     eyes    Peaches [peach (prunus persica)] Swelling     eyes    Shellfish containing products Swelling    Fig tree Swelling     itching    Tuberculin spenser test ppd Rash     Past Medical History:   Diagnosis Date    AICD (automatic cardioverter/defibrillator) present 12/13/2015      Chronic anticoagulation 5/5/2016    Chronic combined systolic and diastolic heart failure 11/26/2012    EF 10-20% on ECHO 2013    Clotting disorder     Coronary artery disease involving native coronary artery of native heart without angina pectoris 11/26/2012    Cath 10- Stents patent non-obstructive disease Cath 11-12015 non-obstructive disease     Diverticulosis of colon     DVT (deep venous thrombosis), unspecified laterality 11/12/2015    Essential hypertension 11/15/2015    Hyperlipidemia     Hypertensive heart disease with heart failure 5/5/2016    MI (myocardial infarction) 2009    x's 5    Nicotine abuse     Obesity 11/26/2012    Pulmonary embolism 2011    Renal disorder     LAVERNE    Stented coronary artery 11/26/2012    LAD stent placed 10/17/2007             Past Surgical History:   Procedure Laterality Date    APPENDECTOMY      BACK SURGERY      CARDIAC DEFIBRILLATOR PLACEMENT      CARDIAC SURGERY  2007    stent    CARPAL TUNNEL RELEASE Right 2018    Embolectomy Right 2017    [facility] 2ND FLR    HEART CATH-LEFT Left 2015    Performed by Britton Restrepo [facility] at [facility] CATH LAB    HEART CATH-RIGHT Right 7/10/2017    Performed by Edison Marshall [facility] at [facility] CATH LAB    IRRIGATION AND DEBRIDEMENT UPPER EXTREMITY - LEFT INDEX FINGER Left 2016    Performed by Cornell Miguel [facility] at [facility] OR 2ND FLR    r knee scope      RELEASE-CARPAL TUNNEL right, right thumb TFR Right 2018    [facility]    RELEASE-FINGER-TRIGGER right thumb Right 2018    [facility] at [facility] OR    SPINE SURGERY      TONSILLECTOMY      TRIGGER FINGER RELEASE Right 2018    thumb     Family History   Problem Relation Age of Onset    Cancer Mother         colon cancer    Heart disease Mother     Diabetes Mother     Heart disease Father     Stroke Father     Colon polyps Father     Cancer Paternal Grandmother         leukemia    No Known Problems Sister     No Known Problems Daughter     No Known Problems Son     No Known Problems Sister     No Known Problems Son      Social History     Tobacco Use    Smoking status: Former Smoker     Packs/day: 1.00     Years: 45.00     Pack years: 45.00     Types: Cigarettes     Last attempt to quit: 2005     Years since quittin.7    Smokeless tobacco: Never Used    Tobacco comment: 1-1.5 ppd every day.   Substance Use Topics    Alcohol use: No     Frequency: Never    Drug use: No     Review of Systems   Constitutional: Negative for chills and fever.   HENT:  Positive for congestion.  Negative for sinus pressure.   positive for sore throat.  Eyes: Negative for visual disturbance.   Respiratory:   Positive for cough.  Negative for shortness breath.  Negative for sputum.    Cardiovascular: Negative for chest pain.   Gastrointestinal: Negative for abdominal pain, diarrhea, nausea and vomiting.   Genitourinary: Negative for flank pain. Negative for dysuria.  Musculoskeletal: Negative for arthralgias and myalgias.   Skin: Negative for rash and wound.   Neurological: Negative for dizziness. Negative for weakness and numbness.   Psychiatric/Behavioral: Negative for confusion.         Physical Exam     Initial Vitals [09/09/18 0734]   BP Pulse Resp Temp SpO2   (!) 149/70 76 18 98.2 °F (36.8 °C) 97 %      MAP       --         Physical Exam   Nursing note and vitals reviewed.  Constitutional: Patient appears well-developed and well-nourished. Not diaphoretic. No distress.   HENT:   Head: Normocephalic and atraumatic.  mild erythema noted to the posterior oropharynx.  No tonsillar swelling or exudates. No trismus.  Uvula is midline.  Eyes: Conjunctivae are normal. No scleral icterus.   Neck: Normal range of motion. Neck supple.   Cardiovascular: Normal rate, regular rhythm and normal heart sounds.  no edema.  Pulmonary/Chest: Breath sounds normal. No respiratory distress.  Abdominal: Soft. There is no tenderness.   Musculoskeletal: Normal range of motion. No edema or tenderness.   Neurological: Alert and oriented to person, place, and time. Normal strength. No sensory deficit.   Skin: Skin is warm and dry. No rash noted. No erythema. No pallor.   Psychiatric: Normal mood and affect. Thought content normal.       ED Course   Procedures  Labs Reviewed - No data to display       Imaging Results    None          Medical Decision Making:   ED Management:  68-year-old male presenting to the emergency department today for evaluation of sore throat and congestion that he states has been present for 3 weeks.  After my initial assessment, I discussed this case with my collaborating physician, Dr. Lopez.  After she evaluated the  patient and discussed his workup with him, the patient eloped from the treatment area and did not return.  No workup or further ED evaluation etc was done as a result of this.                         Clinical Impression:   The primary encounter diagnosis was Throat pain. A diagnosis of Throat congestion was also pertinent to this visit.                             Jacquelin Alarcon NP  09/11/18 1137       Jacquelin Alarcon NP  09/11/18 114

## 2018-09-09 NOTE — ED NOTES
LOC: The patient is awake, alert, and oriented to place, time, situation. Affect is appropriate.  Speech is appropriate and clear.     APPEARANCE: Patient resting comfortably in no acute distress.     SKIN: The skin is warm and dry; color consistent with ethnicity.  Patient has normal skin turgor and moist mucus membranes.  Skin intact; no breakdown or bruising noted.     MUSCULOSKELETAL: Patient moving upper and lower extremities without difficulty.  Denies weakness.     RESPIRATORY: Airway is open and patent. Respirations spontaneous, even, easy, and non-labored.  Patient has a normal effort and rate.  No accessory muscle use noted. Reporting productive cough and sore throat. BS clear.    CARDIAC: No peripheral edema noted. No complaints of chest pain.      ABDOMEN: Soft and non tender to palpation.  No distention noted.     NEUROLOGIC: Eyes open spontaneously.  Behavior appropriate to situation.  Follows commands; facial expression symmetrical.  Purposeful motor response noted; normal sensation in all extremities.

## 2018-09-09 NOTE — ED TRIAGE NOTES
Comes to the ED with c/o continued neck pain, coughing up green sputum, and difficulty swallowing.

## 2018-09-10 ENCOUNTER — HOSPITAL ENCOUNTER (EMERGENCY)
Facility: HOSPITAL | Age: 68
Discharge: HOME | End: 2018-09-10
Attending: EMERGENCY MEDICINE
Payer: MEDICARE

## 2018-09-10 ENCOUNTER — TELEPHONE (OUTPATIENT)
Dept: ELECTROPHYSIOLOGY | Facility: CLINIC | Age: 68
End: 2018-09-10

## 2018-09-10 ENCOUNTER — ANESTHESIA EVENT (OUTPATIENT)
Dept: MEDSURG UNIT | Facility: HOSPITAL | Age: 68
End: 2018-09-10

## 2018-09-10 ENCOUNTER — OFFICE VISIT (OUTPATIENT)
Dept: INTERNAL MEDICINE | Facility: CLINIC | Age: 68
End: 2018-09-10
Payer: MEDICARE

## 2018-09-10 VITALS
SYSTOLIC BLOOD PRESSURE: 132 MMHG | HEART RATE: 77 BPM | WEIGHT: 233.44 LBS | DIASTOLIC BLOOD PRESSURE: 74 MMHG | OXYGEN SATURATION: 95 % | TEMPERATURE: 99 F | BODY MASS INDEX: 36.64 KG/M2 | HEIGHT: 67 IN

## 2018-09-10 VITALS
WEIGHT: 233 LBS | BODY MASS INDEX: 36.57 KG/M2 | HEIGHT: 67 IN | RESPIRATION RATE: 16 BRPM | DIASTOLIC BLOOD PRESSURE: 81 MMHG | SYSTOLIC BLOOD PRESSURE: 146 MMHG | TEMPERATURE: 98 F | OXYGEN SATURATION: 96 % | HEART RATE: 82 BPM

## 2018-09-10 DIAGNOSIS — M54.2 NECK PAIN: ICD-10-CM

## 2018-09-10 DIAGNOSIS — J06.9 UPPER RESPIRATORY TRACT INFECTION, UNSPECIFIED TYPE: Primary | ICD-10-CM

## 2018-09-10 DIAGNOSIS — R07.9 CHEST PAIN, UNSPECIFIED TYPE: Primary | ICD-10-CM

## 2018-09-10 DIAGNOSIS — R07.9 CHEST PAIN: ICD-10-CM

## 2018-09-10 LAB
ALBUMIN SERPL BCP-MCNC: 3.3 G/DL
ALP SERPL-CCNC: 96 U/L
ALT SERPL W/O P-5'-P-CCNC: 27 U/L
ANION GAP SERPL CALC-SCNC: 5 MMOL/L
AST SERPL-CCNC: 20 U/L
BASOPHILS # BLD AUTO: 0.05 K/UL
BASOPHILS NFR BLD: 0.5 %
BILIRUB SERPL-MCNC: 0.9 MG/DL
BNP SERPL-MCNC: 234 PG/ML
BUN SERPL-MCNC: 12 MG/DL
CALCIUM SERPL-MCNC: 9.1 MG/DL
CHLORIDE SERPL-SCNC: 106 MMOL/L
CO2 SERPL-SCNC: 29 MMOL/L
CREAT SERPL-MCNC: 0.9 MG/DL
DIFFERENTIAL METHOD: ABNORMAL
EOSINOPHIL # BLD AUTO: 0.1 K/UL
EOSINOPHIL NFR BLD: 1.1 %
ERYTHROCYTE [DISTWIDTH] IN BLOOD BY AUTOMATED COUNT: 14.3 %
EST. GFR  (AFRICAN AMERICAN): >60 ML/MIN/1.73 M^2
EST. GFR  (NON AFRICAN AMERICAN): >60 ML/MIN/1.73 M^2
GLUCOSE SERPL-MCNC: 100 MG/DL
HCT VFR BLD AUTO: 42.2 %
HGB BLD-MCNC: 13.4 G/DL
IMM GRANULOCYTES # BLD AUTO: 0.11 K/UL
IMM GRANULOCYTES NFR BLD AUTO: 1.2 %
LYMPHOCYTES # BLD AUTO: 1.7 K/UL
LYMPHOCYTES NFR BLD: 18.5 %
MCH RBC QN AUTO: 30.3 PG
MCHC RBC AUTO-ENTMCNC: 31.8 G/DL
MCV RBC AUTO: 96 FL
MONOCYTES # BLD AUTO: 1 K/UL
MONOCYTES NFR BLD: 10.4 %
NEUTROPHILS # BLD AUTO: 6.4 K/UL
NEUTROPHILS NFR BLD: 68.3 %
NRBC BLD-RTO: 0 /100 WBC
PLATELET # BLD AUTO: 265 K/UL
PMV BLD AUTO: 10.2 FL
POTASSIUM SERPL-SCNC: 4.5 MMOL/L
PROT SERPL-MCNC: 6.4 G/DL
RBC # BLD AUTO: 4.42 M/UL
SODIUM SERPL-SCNC: 140 MMOL/L
TROPONIN I SERPL DL<=0.01 NG/ML-MCNC: 0.03 NG/ML
TROPONIN I SERPL DL<=0.01 NG/ML-MCNC: 0.03 NG/ML
WBC # BLD AUTO: 9.41 K/UL

## 2018-09-10 PROCEDURE — 84484 ASSAY OF TROPONIN QUANT: CPT | Mod: 91

## 2018-09-10 PROCEDURE — 99213 OFFICE O/P EST LOW 20 MIN: CPT | Mod: PBBFAC,25 | Performed by: INTERNAL MEDICINE

## 2018-09-10 PROCEDURE — 93010 ELECTROCARDIOGRAM REPORT: CPT | Mod: ,,, | Performed by: INTERNAL MEDICINE

## 2018-09-10 PROCEDURE — 83880 ASSAY OF NATRIURETIC PEPTIDE: CPT

## 2018-09-10 PROCEDURE — 25000003 PHARM REV CODE 250: Performed by: PHYSICIAN ASSISTANT

## 2018-09-10 PROCEDURE — 99999 PR PBB SHADOW E&M-EST. PATIENT-LVL III: CPT | Mod: PBBFAC,,, | Performed by: INTERNAL MEDICINE

## 2018-09-10 PROCEDURE — 3075F SYST BP GE 130 - 139MM HG: CPT | Mod: CPTII,,, | Performed by: INTERNAL MEDICINE

## 2018-09-10 PROCEDURE — 1101F PT FALLS ASSESS-DOCD LE1/YR: CPT | Mod: CPTII,,, | Performed by: INTERNAL MEDICINE

## 2018-09-10 PROCEDURE — 94761 N-INVAS EAR/PLS OXIMETRY MLT: CPT

## 2018-09-10 PROCEDURE — 85025 COMPLETE CBC W/AUTO DIFF WBC: CPT

## 2018-09-10 PROCEDURE — 3078F DIAST BP <80 MM HG: CPT | Mod: CPTII,,, | Performed by: INTERNAL MEDICINE

## 2018-09-10 PROCEDURE — 80053 COMPREHEN METABOLIC PANEL: CPT

## 2018-09-10 PROCEDURE — 99214 OFFICE O/P EST MOD 30 MIN: CPT | Mod: S$PBB,,, | Performed by: INTERNAL MEDICINE

## 2018-09-10 PROCEDURE — 99284 EMERGENCY DEPT VISIT MOD MDM: CPT | Mod: 25,27,NTX

## 2018-09-10 PROCEDURE — 99285 EMERGENCY DEPT VISIT HI MDM: CPT | Mod: ,,, | Performed by: PHYSICIAN ASSISTANT

## 2018-09-10 RX ORDER — ASPIRIN 325 MG
325 TABLET, DELAYED RELEASE (ENTERIC COATED) ORAL
Status: COMPLETED | OUTPATIENT
Start: 2018-09-10 | End: 2018-09-10

## 2018-09-10 RX ORDER — METHOCARBAMOL 500 MG/1
500 TABLET, FILM COATED ORAL
Status: COMPLETED | OUTPATIENT
Start: 2018-09-10 | End: 2018-09-10

## 2018-09-10 RX ORDER — LIDOCAINE 50 MG/G
1 PATCH TOPICAL DAILY
Qty: 5 PATCH | Refills: 0 | Status: SHIPPED | OUTPATIENT
Start: 2018-09-10 | End: 2019-01-25

## 2018-09-10 RX ORDER — OXYCODONE AND ACETAMINOPHEN 10; 325 MG/1; MG/1
1 TABLET ORAL EVERY 4 HOURS PRN
COMMUNITY
End: 2019-01-25

## 2018-09-10 RX ADMIN — METHOCARBAMOL 500 MG: 500 TABLET ORAL at 05:09

## 2018-09-10 RX ADMIN — ASPIRIN 325 MG: 325 TABLET, DELAYED RELEASE ORAL at 12:09

## 2018-09-10 NOTE — ED PROVIDER NOTES
Encounter Date: 9/10/2018       History     Chief Complaint   Patient presents with    Chest Pain     sent from im clinic, chest, neck and jaw pain,     Patient is a 68 year old male with PMHX of combined CHF with 20%EF, AICD, CAD on Plavix 75 mg, ischemic cardiomyopathy, HTN, HLD, hx of cardiac stent, hx of MI, CKD stage 3, diverticulosis, hx of PE and hx of clotting disorder. He presents to the ED for chest pain. Patient was referred to ED by primary care for higher level of care. He reports having sternal chest pain for approximately three months. Reports last experiencing pain two days ago. Denies pain at this time. Describes pain as constant and tightness. Reports pain radiation to neck and jaw. Rates pain 0/10. Denies ASA or NTG use. Reports pain occurring at rest. Denies change in pain character or severity. Reports associated SOB. Reports pain relief with percocet. Also, reports having congestion, sore throat, and productive cough with green sputum. He denies fever,chills, nausea, vomiting, abd pain, dysuria, diarrhea, or constipation. He is a former smoker and denies alcohol use. Patient is followed by Dr. Gongora in transplant.           Review of patient's allergies indicates:   Allergen Reactions    Iodine containing multivitamin Swelling     itching    Keflex [cephalexin] Swelling     eyes    Peaches [peach (prunus persica)] Swelling     eyes    Shellfish containing products Swelling    Fig tree Swelling     itching    Tuberculin spenser test ppd Rash     Past Medical History:   Diagnosis Date    AICD (automatic cardioverter/defibrillator) present 12/13/2015      Chronic anticoagulation 5/5/2016    Chronic combined systolic and diastolic heart failure 11/26/2012    EF 10-20% on ECHO 2013    Clotting disorder     Coronary artery disease involving native coronary artery of native heart without angina pectoris 11/26/2012    Cath 10- Stents patent non-obstructive disease Cath  [id number] non-obstructive disease     Diverticulosis of colon     DVT (deep venous thrombosis), unspecified laterality 11/12/2015    Essential hypertension 11/15/2015    Hyperlipidemia     Hypertensive heart disease with heart failure 5/5/2016    MI (myocardial infarction) 2009    x's 5    Nicotine abuse     Obesity 11/26/2012    Pulmonary embolism 2011    Renal disorder     LAVERNE    Stented coronary artery 11/26/2012    LAD stent placed 10/17/2007      Past Surgical History:   Procedure Laterality Date    APPENDECTOMY      BACK SURGERY      CARDIAC DEFIBRILLATOR PLACEMENT      CARDIAC SURGERY  2007    stent    CARPAL TUNNEL RELEASE Right 04/2018    Embolectomy Right 11/26/2017    [facility] 2ND FLR    HEART CATH-LEFT Left 11/13/2015    Performed by Britton [facility]repo [facility] at [facility] CATH LAB    HEART CATH-RIGHT Right 7/10/2017    Performed by Edison Marshall MD at [facility] CATH LAB    IRRIGATION AND DEBRIDEMENT UPPER EXTREMITY - LEFT INDEX FINGER Left 12/8/2016    Performed by Cornell Miguel MD at [facility] OR 2ND FLR    r knee scope      RELEASE-CARPAL TUNNEL right, right thumb TFR Right 4/6/2018    [facility]    RELEASE-FINGER-TRIGGER right thumb Right 4/6/2018    [facility] at [facility] OR    SPINE SURGERY      TONSILLECTOMY      TRIGGER FINGER RELEASE Right 04/2018    thumb     Family History   Problem Relation Age of Onset    Cancer Mother         colon cancer    Heart disease Mother     Diabetes Mother     Heart disease Father     Stroke Father     Colon polyps Father     Cancer Paternal Grandmother         leukemia    No Known Problems Sister     No Known Problems Daughter     No Known Problems Son     No Known Problems Sister     No Known Problems Son      Social History     Tobacco Use    Smoking status: Former Smoker     Packs/day: 1.00     Years: 45.00     Pack years: 45.00     Types:  Cigarettes     Last attempt to quit: 2005     Years since quittin.7    Smokeless tobacco: Never Used    Tobacco comment: 1-1.5 ppd every day.   Substance Use Topics    Alcohol use: No     Frequency: Never    Drug use: No     Review of Systems   Constitutional: Negative for fever.   HENT: Positive for congestion and sore throat.    Respiratory: Positive for cough, chest tightness and shortness of breath.    Cardiovascular: Positive for chest pain.   Gastrointestinal: Negative for abdominal pain, nausea and vomiting.   Genitourinary: Negative for dysuria.   Musculoskeletal: Negative for back pain.   Skin: Negative for rash.   Neurological: Negative for weakness.   Hematological: Does not bruise/bleed easily.       Physical Exam     Initial Vitals [09/10/18 1023]   BP Pulse Resp Temp SpO2   138/65 74 18 98 °F (36.7 °C) 98 %      MAP       --         Physical Exam    Vitals reviewed.  Constitutional: He appears well-developed and well-nourished. He is Obese .   Patient resting comfortably in NAD on RA.    HENT:   Head: Normocephalic and atraumatic.   Nose: Nose normal.   Mouth/Throat: Uvula is midline, oropharynx is clear and moist and mucous membranes are normal. No trismus in the jaw. No uvula swelling. No oropharyngeal exudate, posterior oropharyngeal edema or posterior oropharyngeal erythema.   Eyes: Conjunctivae and EOM are normal. Pupils are equal, round, and reactive to light.   Neck: Full passive range of motion without pain. Muscular tenderness present. No spinous process tenderness present.   Cardiovascular: Normal rate and regular rhythm. Exam reveals no friction rub.    Murmur heard.  Pulmonary/Chest: No respiratory distress. He has no wheezes. He has rhonchi. He has no rales.   Abdominal: Soft. Bowel sounds are normal. He exhibits no distension. There is no tenderness.   Musculoskeletal: Normal range of motion. He exhibits no edema.   Neurological: He is alert and oriented to person, place,  and time. He has normal strength. No sensory deficit.   Skin: Skin is warm and dry. No erythema.   Psychiatric: He has a normal mood and affect. Thought content normal.         ED Course   Procedures  Labs Reviewed   CBC W/ AUTO DIFFERENTIAL - Abnormal; Notable for the following components:       Result Value    RBC 4.42 (*)     Hemoglobin 13.4 (*)     MCHC 31.8 (*)     Immature Granulocytes 1.2 (*)     Immature Grans (Abs) 0.11 (*)     All other components within normal limits   COMPREHENSIVE METABOLIC PANEL - Abnormal; Notable for the following components:    Albumin 3.3 (*)     Anion Gap 5 (*)     All other components within normal limits   TROPONIN I - Abnormal; Notable for the following components:    Troponin I 0.031 (*)     All other components within normal limits   B-TYPE NATRIURETIC PEPTIDE - Abnormal; Notable for the following components:     (*)     All other components within normal limits   TROPONIN I     EKG Readings: (Independently Interpreted)   Initial Reading: No STEMI. Rhythm: Normal Sinus Rhythm. Heart Rate: 71. Ectopy: No Ectopy. Conduction: LBBB. ST Segments: Normal ST Segments. T Waves: Normal. Axis: Left Axis Deviation. Other Impression: Negative Sgarbossa criteria for acute MI.     ECG Results          EKG 12-lead (Final result)  Result time 09/10/18 11:52:41    Final result by Interface, Lab In [facility] (09/10/18 11:52:41)                 Narrative:    Test Reason : R07.9  Blood Pressure : 138/65 mmHG  Vent. Rate : 071 BPM     Atrial Rate : 071 BPM     P-R Int : 178 ms          QRS Dur : 146 ms      QT Int : 444 ms       P-R-T Axes : 025 -64 104 degrees     QTc Int : 482 ms    Normal sinus rhythm  Left axis deviation  Left bundle branch block  Abnormal ECG  When compared with ECG of 29-AUG-2018 10:02,  No significant change was found  Confirmed by Jacinto Lynn MD ([id number]) on 9/10/2018 11:52:33 AM    Referred By: SHEREE   SELF           Confirmed By:Jacinto Lynn MD                             Imaging Results          X-Ray Chest PA And Lateral (Final result)  Result time 09/10/18 12:40:46    Final result by Cornell Horton MD (09/10/18 12:40:46)                 Impression:      Postoperative changes and cardiomegaly.  Stable findings.      Electronically signed by: Cornell Horton MD  Date:    09/10/2018  Time:    12:40             Narrative:    EXAMINATION:  XR CHEST PA AND LATERAL    CLINICAL HISTORY:  Chest Pain;    TECHNIQUE:  PA and lateral views of the chest were performed.    COMPARISON:  08/29/2018    FINDINGS:  Heart is enlarged.  Pacemaker overlies the left chest with leads entering the right atrium and right ventricle.  Mild interstitial changes are present which are similar to the prior study.  No pneumothorax or pleural effusion is identified.  Pulmonary vascularity is on changed and not significantly congested.                                       APC / Resident Notes:   Patient is a 68 year old male with PMHX of combined CHF with 20%EF, AICD, CAD on Plavix 75 mg, ischemic cardiomyopathy, HTN, HLD, hx of cardiac stent, hx of MI, CKD stage 3, diverticulosis, hx of PE and hx of clotting disorder. He presents to the ED for chest pain.     Will order labs and imaging. Will order  mg. Will continue to monitor.     Differential diagnoses include, but are not limited to: ACS, cardiac arrhthymias, pneumonia, bronchitis, muscular strain, or electrolyte imbalance.     No leukocytosis. Hemodynamically stable. Elevated initial troponin 0.031. Minimally elevated . CXR found to have cardiomegaly. Will continue to monitor.     Patient reassessed, patient resting comfortably in NAD on RA. Patient remains chest pain free during ED visit. Will continue to monitor.     3:41 PM  Cardiology at bedside evaluating patient awaiting recommendations.     Serial troponin WNL. Appreciate cardiology consult. They believe patient is stable for discharge from cardiac standpoint. They believe  patient is having non-cardiac chest pain with a pleuritic component, this may be related to his URI type presentation with productive cough. The associated neck pain is completely muscular in nature. They believe his cardiomyopathy is likely the cause of his chronic minimal troponin elevation.     Patient reassessed, reports symptoms improved with ED management. Patient requesting to be discharged home. Patient declines to be discharged home with muscle relaxant as he is unable to afford prescription. I have discussed emergency department findings, and plan with the patient. Will discharge home with Lidoderm patches and F/U with cardiology and PCP. Patient verbalizes understanding of plan and agrees. Return precautions given.     I have discussed and reviewed with my supervising physician.       Clinical Impression:   The primary encounter diagnosis was Upper respiratory tract infection, unspecified type. Diagnoses of Chest pain and Neck pain were also pertinent to this visit.      Disposition:   Disposition: Discharged  Condition: Stable                        Mary Vanegas PA-C  09/10/18 [id number]

## 2018-09-10 NOTE — SUBJECTIVE & OBJECTIVE
Past Medical History:   Diagnosis Date    AICD (automatic cardioverter/defibrillator) present 12/13/2015      Chronic anticoagulation 5/5/2016    Chronic combined systolic and diastolic heart failure 11/26/2012    EF 10-20% on ECHO 2013    Clotting disorder     Coronary artery disease involving native coronary artery of native heart without angina pectoris 11/26/2012    Cath 10- Stents patent non-obstructive disease Cath [id number] non-obstructive disease     Diverticulosis of colon     DVT (deep venous thrombosis), unspecified laterality 11/12/2015    Essential hypertension 11/15/2015    Hyperlipidemia     Hypertensive heart disease with heart failure 5/5/2016    MI (myocardial infarction) 2009    x's 5    Nicotine abuse     Obesity 11/26/2012    Pulmonary embolism 2011    Renal disorder     LAVERNE    Stented coronary artery 11/26/2012    LAD stent placed 10/17/2007        Past Surgical History:   Procedure Laterality Date    APPENDECTOMY      BACK SURGERY      CARDIAC DEFIBRILLATOR PLACEMENT      CARDIAC SURGERY  2007    stent    CARPAL TUNNEL RELEASE Right 04/2018    Embolectomy Right 11/26/2017    Performed by [facility] at [facility] 2ND FLR    HEART CATH-LEFT Left 11/13/2015    [facility] by [facility]on [facility] at [facility] CATH LAB    HEART CATH-RIGHT Right 7/10/2017    Performed by Edison [facility] at [facility] CATH LAB    IRRIGATION AND DEBRIDEMENT UPPER EXTREMITY - LEFT INDEX FINGER Left 12/8/2016    Performed by Cornell [facility] at [facility] 2ND FLR    r knee scope      RELEASE-CARPAL TUNNEL right, right thumb TFR Right 4/6/2018    [facility]    RELEASE-FINGER-TRIGGER right thumb Right 4/6/2018    [facility] at [facility] OR    SPINE SURGERY      TONSILLECTOMY      TRIGGER FINGER RELEASE Right 04/2018    thumb       Review of patient's allergies indicates:   Allergen Reactions    Iodine containing  multivitamin Swelling     itching    Keflex [cephalexin] Swelling     eyes    Peaches [peach (prunus persica)] Swelling     eyes    Shellfish containing products Swelling    Fig tree Swelling     itching    Tuberculin spenser test ppd Rash       No current facility-administered medications on file prior to encounter.      Current Outpatient Medications on File Prior to Encounter   Medication Sig    allopurinol (ZYLOPRIM) 100 MG tablet TAKE 1 TABLET(100 MG) BY MOUTH EVERY DAY    amiodarone (PACERONE) 200 MG Tab Take 1 tablet (200 mg total) by mouth once daily.    aspirin (ECOTRIN) 325 MG EC tablet Take 325 mg by mouth once daily.    atorvastatin (LIPITOR) 80 MG tablet TAKE 1 TABLET(80 MG) BY MOUTH EVERY DAY    clopidogrel (PLAVIX) 75 mg tablet Take 1 tablet (75 mg total) by mouth once daily.    furosemide (LASIX) 40 MG tablet TAKE 1 TABLET(40 MG) BY MOUTH EVERY DAY    gabapentin (NEURONTIN) 100 MG capsule Take 1 capsule (100 mg total) by mouth 2 (two) times daily. for 5 days    HYDROcodone-acetaminophen (NORCO)  mg per tablet Take 1 tablet by mouth every 6 (six) hours as needed for Pain.    metoprolol succinate (TOPROL-XL) 50 MG 24 hr tablet TAKE 1 TABLET BY MOUTH EVERY DAY    oxyCODONE-acetaminophen (PERCOCET)  mg per tablet Take 1 tablet by mouth every 4 (four) hours as needed for Pain.    diphenhydrAMINE (BENADRYL) 50 MG tablet Take 1 tab at 6 pm & 11 pm on night before your proc, then 1 tab at 6 am on day of proc    docusate sodium (COLACE) 50 MG capsule Take 1 capsule (50 mg total) by mouth 2 (two) times daily. (Patient taking differently: Take 50 mg by mouth 2 (two) times daily as needed. )     Family History     Problem Relation (Age of Onset)    Cancer Mother, Paternal Grandmother    Colon polyps Father    Diabetes Mother    Heart disease Mother, Father    No Known Problems Sister, Daughter, Son, Sister, Son    Stroke Father        Tobacco Use    Smoking status: Former Smoker      Packs/day: 1.00     Years: 45.00     Pack years: 45.00     Types: Cigarettes     Last attempt to quit: 2005     Years since quittin.7    Smokeless tobacco: Never Used    Tobacco comment: 1-1.5 ppd every day.   Substance and Sexual Activity    Alcohol use: No     Frequency: Never    Drug use: No    Sexual activity: No     Review of Systems   Constitution: Positive for weakness. Negative for chills, diaphoresis, fever and weight loss.   HENT: Negative for congestion, hoarse voice and sore throat.    Eyes: Negative for blurred vision and double vision.   Cardiovascular: Positive for dyspnea on exertion. Negative for near-syncope, orthopnea, palpitations, paroxysmal nocturnal dyspnea and syncope.   Respiratory: Positive for cough, shortness of breath and sputum production.    Endocrine: Negative for cold intolerance and heat intolerance.   Hematologic/Lymphatic: Does not bruise/bleed easily.   Gastrointestinal: Negative for abdominal pain, constipation, diarrhea, excessive appetite, hematemesis, hematochezia, melena, nausea and vomiting.   Genitourinary: Negative for dysuria and frequency.   Neurological: Negative for focal weakness and sensory change.     Objective:     Vital Signs (Most Recent):  Temp: 98.1 °F (36.7 °C) (09/10/18 1348)  Pulse: 72 (09/10/18 1420)  Resp: 12 (09/10/18 1348)  BP: (!) 152/77 (09/10/18 1420)  SpO2: 96 % (09/10/18 1420) Vital Signs (24h Range):  Temp:  [98 °F (36.7 °C)-98.5 °F (36.9 °C)] 98.1 °F (36.7 °C)  Pulse:  [72-77] 72  Resp:  [12-20] 12  SpO2:  [95 %-99 %] 96 %  BP: (127-152)/(65-77) 152/77     Weight: 105.7 kg (233 lb)  Body mass index is 36.49 kg/m².    SpO2: 96 %  O2 Device (Oxygen Therapy): room air    No intake or output data in the 24 hours ending 09/10/18 1603    Lines/Drains/Airways     Peripheral Intravenous Line                 Peripheral IV - Single Lumen 09/10/18 1250 Left Hand less than 1 day                Physical Exam   Constitutional: He is oriented to  person, place, and time. He appears well-developed and well-nourished.   HENT:   Head: Normocephalic and atraumatic.   Eyes: EOM are normal. Pupils are equal, round, and reactive to light.   Neck: No JVD present. Muscular tenderness present. Decreased range of motion present.   Cardiovascular: Normal rate, regular rhythm and intact distal pulses. Exam reveals no gallop and no friction rub.   No murmur heard.  Pulmonary/Chest: Effort normal and breath sounds normal. No respiratory distress. He has no wheezes.   Abdominal: Soft. Bowel sounds are normal. He exhibits no distension.   Musculoskeletal: He exhibits no edema.   Neurological: He is alert and oriented to person, place, and time.   Skin: Skin is warm and dry.   Psychiatric: He has a normal mood and affect.       Significant Labs:   Recent Lab Results       09/10/18  1449 09/10/18  1255      Immature Granulocytes  1.2     Immature Grans (Abs)  0.11  Comment:  Mild elevation in immature granulocytes is non specific and   can be seen in a variety of conditions including stress response,   acute inflammation, trauma and pregnancy. Correlation with other   laboratory and clinical findings is essential.       Albumin  3.3     Alkaline Phosphatase  96     ALT  27     Anion Gap  5     AST  20     Baso #  0.05     Basophil%  0.5     Total Bilirubin  0.9  Comment:  For infants and newborns, interpretation of results should be based  on gestational age, weight and in agreement with clinical  observations.  Premature Infant recommended reference ranges:  Up to 24 hours.............<8.0 mg/dL  Up to 48 hours............<12.0 mg/dL  3-5 days..................<15.0 mg/dL  6-29 days.................<15.0 mg/dL       BNP  234  Comment:  Values of less than 100 pg/ml are consistent with non-CHF populations.     BUN, Bld  12     Calcium  9.1     Chloride  106     CO2  29     Creatinine  0.9     Differential Method  Automated     eGFR if African American  >60.0     eGFR if non    >60.0  Comment:  Calculation used to obtain the estimated glomerular filtration  rate (eGFR) is the CKD-EPI equation.        Eos #  0.1     Eosinophil%  1.1     Glucose  100     Gran # (ANC)  6.4     Gran%  68.3     Hematocrit  42.2     Hemoglobin  13.4     Lymph #  1.7     Lymph%  18.5     MCH  30.3     MCHC  31.8     MCV  96     Mono #  1.0     Mono%  10.4     MPV  10.2     nRBC  0     Platelets  265     Potassium  4.5     Total Protein  6.4     RBC  4.42     RDW  14.3     Sodium  140     Troponin I 0.026  Comment:  The reference interval for Troponin I represents the 99th percentile   cutoff   for our facility and is consistent with 3rd generation assay   performance.   0.031  Comment:  The reference interval for Troponin I represents the 99th percentile   cutoff   for our facility and is consistent with 3rd generation assay   performance.       WBC  9.41           Significant Imaging: Echocardiogram:   2D echo with color flow doppler:   Results for orders placed or performed during the hospital encounter of 04/27/18   2D echo with color flow doppler   Result Value Ref Range    EF 20 (A) 55 - 65    Mitral Valve Regurgitation TRIVIAL     Diastolic Dysfunction Yes (A)     Est. PA Systolic Pressure 25.28     Tricuspid Valve Regurgitation TRIVIAL TO MILD

## 2018-09-10 NOTE — TELEPHONE ENCOUNTER
Calling to confirm procedure for tomorrow.  Pt currently in ED right now for pain in back of his neck and a cough.  Pt will call me and let me know what the ED provider says and if he will cancel procedure for tomorrow or not.    Phone number given to call tomorrow.

## 2018-09-10 NOTE — HPI
68 y.o. male with a hx of CAD s/p STEMI (s/p PCI LAD 2007), CHF/ICM and remote MI, quit smoking Dec 2015,  PE (2010, s/p 1-yr coumadin), HTN, DLP and s/p ICD [employer] placement due to VT pending CRT upgrade tomorrow who presents with chief complaint of chest pain after being referred to the ED by his primary care physician. Has been having chest pain episodes off and on for the last several months as well as jaw pain. Pain occurs at rest and improves with percocets. Cough worsens the chest pain. New congestion with productive cough is also noted. Last seen in HTS clinic by Dr. Gongora four days ago 9/6. Regarding his neck pain, it is not related to the chest pain with coughing. He has limited mobility of his neck and is under the care of an ortho as well as does therapy for this. He does not check his weight at home however his weights in clinic have all been stable, he states around 104-105kg. He denies orthopnea, PND. He is comfortable laying flat.

## 2018-09-10 NOTE — ED TRIAGE NOTES
Patient received with complaint of congestion; constant sharp pains up head; chest tightness (has not felt tightness for 2 days); head aches; gout; sore throat; reports coughing up green plem;  onset 3 months; pt. Canceled his pace maker appointment for tomorrow. Pt. Reports taking percocet this morning for the pain. Denies fever or chills.     No LDA's in place on arrival to department.    No family present.    Pain:  Denies chest pain right now.  Pt. Reports only pain is gout pain.      Psychosocial:  Patient is calm and cooperative.  Patients insight and judgement are appropriate to situation.  Appears clean, well maintained, with clothing appropriate to environment.  No evidence of hallucinations, delusions, or psychosis.    Neuro:  Eyes open spontaneously.  Awake, alert.  Oriented x 4.  Speech clear and appropriate.  Tolerating saliva secretions well.  Able to follow commands, demonstrating ability to actively and appropriately communicate within context of current conversation.  Symmetrical facial muscles.      Airway:  Bilateral chest rise and fall.  RR regular and non labored.  Air entry patent. Cough present.     Circulatory:  Skin warm, dry.  Radial pulses strong and regular.     Abdomen:  Abdomen distended.      Urinary:  Denies pain or burning.    Extremities:  No swelling noted.

## 2018-09-10 NOTE — TELEPHONE ENCOUNTER
----- Message from Alla Knapp sent at 9/10/2018 11:45 AM CDT -----  Contact: pt  Pt still has not been seen in the ER yet and said to please cancel his procedure for tomorrow.    Thank you

## 2018-09-10 NOTE — CONSULTS
[facility]  Cardiology  Consult Note    Patient Name: Audrey Oneil Jr.  MRN: [id number]  Admission Date: 9/10/2018  Hospital Length of Stay: 0 days  Code Status: Prior   Attending Provider: Gal Casillas III, MD   Consulting Provider: Mac Velasco MD  Primary Care Physician: January Khan MD  Principal Problem:<principal problem not specified>    Patient information was obtained from patient and ER records.     Inpatient consult to Cardiology  Consult performed by: Mac Velasco MD  Consult ordered by: Mary Vanegas PA-C        Subjective:     Chief Complaint:  Chest pain, productive cough, neck pain     HPI:   68 y.o. male with a hx of CAD s/p STEMI (s/p PCI LAD 2007), CHF/ICM and remote MI, quit smoking Dec 2015,  PE (2010, s/p 1-yr coumadin), HTN, DLP and s/p ICD [employer] placement due to VT pending CRT upgrade tomorrow who presents with chief complaint of chest pain after being referred to the ED by his primary care physician. Has been having chest pain episodes off and on for the last several months as well as jaw pain. Pain occurs at rest and improves with percocets. Cough worsens the chest pain. New congestion with productive cough is also noted. Last seen in HTS clinic by Dr. Gongora four days ago 9/6. Regarding his neck pain, it is not related to the chest pain with coughing. He has limited mobility of his neck and is under the care of an ortho as well as does therapy for this. He does not check his weight at home however his weights in clinic have all been stable, he states around 104-105kg. He denies orthopnea, PND. He is comfortable laying flat.       Past Medical History:   Diagnosis Date    AICD (automatic cardioverter/defibrillator) present 12/13/2015      Chronic anticoagulation 5/5/2016    Chronic combined systolic and diastolic heart failure 11/26/2012    EF 10-20% on ECHO 2013    Clotting disorder     Coronary artery disease involving native  coronary artery of native heart without angina pectoris 11/26/2012    Cath 10- Stents patent non-obstructive disease Cath 11-12015 non-obstructive disease     Diverticulosis of colon     DVT (deep venous thrombosis), unspecified laterality 11/12/2015    Essential hypertension 11/15/2015    Hyperlipidemia     Hypertensive heart disease with heart failure 5/5/2016    MI (myocardial infarction) 2009    x's 5    Nicotine abuse     Obesity 11/26/2012    Pulmonary embolism 2011    Renal disorder     LAVERNE    Stented coronary artery 11/26/2012    LAD stent placed 10/17/2007        Past Surgical History:   Procedure Laterality Date    APPENDECTOMY      BACK SURGERY      CARDIAC DEFIBRILLATOR PLACEMENT      CARDIAC SURGERY  2007    stent    CARPAL TUNNEL RELEASE Right 04/2018    Embolectomy Right 11/26/2017    [facility] 2ND FLR    HEART CATH-LEFT Left 11/13/2015    [facility]on Restrepo [facility] CATH LAB    HEART CATH-RIGHT Right 7/10/2017    Performed by Edison Marshall [facility] at [facility] CATH LAB    IRRIGATION AND DEBRIDEMENT UPPER EXTREMITY - LEFT INDEX FINGER Left 12/8/2016    Performed by Cornell Miguel [facility] at [facility] OR 2ND FLR    r knee scope      RELEASE-CARPAL TUNNEL right, right thumb TFR Right 4/6/2018    [facility]    RELEASE-FINGER-TRIGGER right thumb Right 4/6/2018    [facility] OR    SPINE SURGERY      TONSILLECTOMY      TRIGGER FINGER RELEASE Right 04/2018    thumb       Review of patient's allergies indicates:   Allergen Reactions    Iodine containing multivitamin Swelling     itching    Keflex [cephalexin] Swelling     eyes    Peaches [peach (prunus persica)] Swelling     eyes    Shellfish containing products Swelling    Fig tree Swelling     itching    Tuberculin spenser test ppd Rash       No current facility-administered medications on file prior to encounter.       Current Outpatient Medications on File Prior to Encounter   Medication Sig    allopurinol (ZYLOPRIM) 100 MG tablet TAKE 1 TABLET(100 MG) BY MOUTH EVERY DAY    amiodarone (PACERONE) 200 MG Tab Take 1 tablet (200 mg total) by mouth once daily.    aspirin (ECOTRIN) 325 MG EC tablet Take 325 mg by mouth once daily.    atorvastatin (LIPITOR) 80 MG tablet TAKE 1 TABLET(80 MG) BY MOUTH EVERY DAY    clopidogrel (PLAVIX) 75 mg tablet Take 1 tablet (75 mg total) by mouth once daily.    furosemide (LASIX) 40 MG tablet TAKE 1 TABLET(40 MG) BY MOUTH EVERY DAY    gabapentin (NEURONTIN) 100 MG capsule Take 1 capsule (100 mg total) by mouth 2 (two) times daily. for 5 days    HYDROcodone-acetaminophen (NORCO)  mg per tablet Take 1 tablet by mouth every 6 (six) hours as needed for Pain.    metoprolol succinate (TOPROL-XL) 50 MG 24 hr tablet TAKE 1 TABLET BY MOUTH EVERY DAY    oxyCODONE-acetaminophen (PERCOCET)  mg per tablet Take 1 tablet by mouth every 4 (four) hours as needed for Pain.    diphenhydrAMINE (BENADRYL) 50 MG tablet Take 1 tab at 6 pm & 11 pm on night before your proc, then 1 tab at 6 am on day of proc    docusate sodium (COLACE) 50 MG capsule Take 1 capsule (50 mg total) by mouth 2 (two) times daily. (Patient taking differently: Take 50 mg by mouth 2 (two) times daily as needed. )     Family History     Problem Relation (Age of Onset)    Cancer Mother, Paternal Grandmother    Colon polyps Father    Diabetes Mother    Heart disease Mother, Father    No Known Problems Sister, Daughter, Son, Sister, Son    Stroke Father        Tobacco Use    Smoking status: Former Smoker     Packs/day: 1.00     Years: 45.00     Pack years: 45.00     Types: Cigarettes     Last attempt to quit: 2005     Years since quittin.7    Smokeless tobacco: Never Used    Tobacco comment: 1-1.5 ppd every day.   Substance and Sexual Activity    Alcohol use: No     Frequency: Never    Drug use: No    Sexual  activity: No     Review of Systems   Constitution: Positive for weakness. Negative for chills, diaphoresis, fever and weight loss.   HENT: Negative for congestion, hoarse voice and sore throat.    Eyes: Negative for blurred vision and double vision.   Cardiovascular: Positive for dyspnea on exertion. Negative for near-syncope, orthopnea, palpitations, paroxysmal nocturnal dyspnea and syncope.   Respiratory: Positive for cough, shortness of breath and sputum production.    Endocrine: Negative for cold intolerance and heat intolerance.   Hematologic/Lymphatic: Does not bruise/bleed easily.   Gastrointestinal: Negative for abdominal pain, constipation, diarrhea, excessive appetite, hematemesis, hematochezia, melena, nausea and vomiting.   Genitourinary: Negative for dysuria and frequency.   Neurological: Negative for focal weakness and sensory change.     Objective:     Vital Signs (Most Recent):  Temp: 98.1 °F (36.7 °C) (09/10/18 1348)  Pulse: 72 (09/10/18 1420)  Resp: 12 (09/10/18 1348)  BP: (!) 152/77 (09/10/18 1420)  SpO2: 96 % (09/10/18 1420) Vital Signs (24h Range):  Temp:  [98 °F (36.7 °C)-98.5 °F (36.9 °C)] 98.1 °F (36.7 °C)  Pulse:  [72-77] 72  Resp:  [12-20] 12  SpO2:  [95 %-99 %] 96 %  BP: (127-152)/(65-77) 152/77     Weight: 105.7 kg (233 lb)  Body mass index is 36.49 kg/m².    SpO2: 96 %  O2 Device (Oxygen Therapy): room air    No intake or output data in the 24 hours ending 09/10/18 1603    Lines/Drains/Airways     Peripheral Intravenous Line                 Peripheral IV - Single Lumen 09/10/18 1250 Left Hand less than 1 day                Physical Exam   Constitutional: He is oriented to person, place, and time. He appears well-developed and well-nourished.   HENT:   Head: Normocephalic and atraumatic.   Eyes: EOM are normal. Pupils are equal, round, and reactive to light.   Neck: No JVD present. Muscular tenderness present. Decreased range of motion present.   Cardiovascular: Normal rate, regular  rhythm and intact distal pulses. Exam reveals no gallop and no friction rub.   No murmur heard.  Pulmonary/Chest: Effort normal and breath sounds normal. No respiratory distress. He has no wheezes.   Abdominal: Soft. Bowel sounds are normal. He exhibits no distension.   Musculoskeletal: He exhibits no edema.   Neurological: He is alert and oriented to person, place, and time.   Skin: Skin is warm and dry.   Psychiatric: He has a normal mood and affect.       Significant Labs:   Recent Lab Results       09/10/18  1449 09/10/18  1255      Immature Granulocytes  1.2     Immature Grans (Abs)  0.11  Comment:  Mild elevation in immature granulocytes is non specific and   can be seen in a variety of conditions including stress response,   acute inflammation, trauma and pregnancy. Correlation with other   laboratory and clinical findings is essential.       Albumin  3.3     Alkaline Phosphatase  96     ALT  27     Anion Gap  5     AST  20     Baso #  0.05     Basophil%  0.5     Total Bilirubin  0.9  Comment:  For infants and newborns, interpretation of results should be based  on gestational age, weight and in agreement with clinical  observations.  Premature Infant recommended reference ranges:  Up to 24 hours.............<8.0 mg/dL  Up to 48 hours............<12.0 mg/dL  3-5 days..................<15.0 mg/dL  6-29 days.................<15.0 mg/dL       BNP  234  Comment:  Values of less than 100 pg/ml are consistent with non-CHF populations.     BUN, Bld  12     Calcium  9.1     Chloride  106     CO2  29     Creatinine  0.9     Differential Method  Automated     eGFR if African American  >60.0     eGFR if non   >60.0  Comment:  Calculation used to obtain the estimated glomerular filtration  rate (eGFR) is the CKD-EPI equation.        Eos #  0.1     Eosinophil%  1.1     Glucose  100     Gran # (ANC)  6.4     Gran%  68.3     Hematocrit  42.2     Hemoglobin  13.4     Lymph #  1.7     Lymph%  18.5     MCH   30.3     MCHC  31.8     MCV  96     Mono #  1.0     Mono%  10.4     MPV  10.2     nRBC  0     Platelets  265     Potassium  4.5     Total Protein  6.4     RBC  4.42     RDW  14.3     Sodium  140     Troponin I 0.026  Comment:  The reference interval for Troponin I represents the 99th percentile   cutoff   for our facility and is consistent with 3rd generation assay   performance.   0.031  Comment:  The reference interval for Troponin I represents the 99th percentile   cutoff   for our facility and is consistent with 3rd generation assay   performance.       WBC  9.41           Significant Imaging: Echocardiogram:   2D echo with color flow doppler:   Results for orders placed or performed during the hospital encounter of 04/27/18   2D echo with color flow doppler   Result Value Ref Range    EF 20 (A) 55 - 65    Mitral Valve Regurgitation TRIVIAL     Diastolic Dysfunction Yes (A)     Est. PA Systolic Pressure 25.28     Tricuspid Valve Regurgitation TRIVIAL TO MILD      Assessment and Plan:     Chest pain    Patient is having non-cardiac chest pain with a pleuritic component, this may be related to his URI type presentation with productive cough  The associated neck pain is completely muscular in nature  His cardiomyopathy is likely the cause of his chronic minimal troponin elevation  His weights have been stable and is compensated on exam  From a cardiac standpoint would make no changes  He should see his primary care provided regarding URI and neck pain  He should reschedule his CRT procedure at his earliest convenience    Discussed with [facility] Staff            VTE Risk Mitigation (From admission, onward)    None          Thank you for your consult. I will sign off. Please contact us if you have any additional questions.    Mac Velasco DO  Cardiology Fellow    Cardiology   [facility]

## 2018-09-10 NOTE — TELEPHONE ENCOUNTER
Attempting to let pt know I received his message.  No answer.  Will have Dr. Pal's nurse reach out to reschedule procedure.

## 2018-09-10 NOTE — ASSESSMENT & PLAN NOTE
Patient is having non-cardiac chest pain with a pleuritic component, this may be related to his URI type presentation with productive cough  The associated neck pain is completely muscular in nature  His cardiomyopathy is likely the cause of his chronic minimal troponin elevation  His weights have been stable and is compensated on exam  From a cardiac standpoint would make no changes  He should see his primary care provided regarding URI and neck pain  He should reschedule his CRT procedure at his earliest convenience    Discussed with HTS Staff

## 2018-09-10 NOTE — DISCHARGE INSTRUCTIONS
Future Appointments   Date Time Provider Department Center   9/20/2018  9:15 AM January Khan MD [facility] IM [address] PCW   10/23/2018  8:00 AM HOME MONITOR DEVICE CHECK, [facility] [address]   11/16/2018  2:00 PM LAB, APPOINTMENT [facility] LAB [facility] [address] [facility]   11/16/2018  3:30 PM Edison Marshall MD [facility] [address]   1/22/2019  8:00 AM HOME MONITOR DEVICE CHECK, [facility] [address]     Our goal in the emergency department is to always give you outstanding care and exceptional service. You may receive a survey by mail or e-mail in the next week regarding your experience in our ED. We would greatly appreciate your completing and returning the survey. Your feedback provides us with a way to recognize our staff who give very good care and it helps us learn how to improve when your experience was below our aspiration of excellence.

## 2018-09-11 ENCOUNTER — TELEPHONE (OUTPATIENT)
Dept: ELECTROPHYSIOLOGY | Facility: CLINIC | Age: 68
End: 2018-09-11

## 2018-09-11 ENCOUNTER — ANESTHESIA (OUTPATIENT)
Dept: MEDSURG UNIT | Facility: HOSPITAL | Age: 68
End: 2018-09-11

## 2018-09-11 NOTE — TELEPHONE ENCOUNTER
Spoke with patient. Patient does not want to reschedule ICD upgrade with lead revision at this time. Patient states he needs to get his Gout, headaches, neck problems under control first. Patient states if/when he is ready to proceed with procedure with - the patient will call MDs office to rescheduled. Message sent to .      Jessie TALAMANTES CCM

## 2018-09-13 NOTE — PROGRESS NOTES
Subjective:       Patient ID: Audrey Oneil Jr. is a 68 y.o. male.    Chief Complaint: Sore Throat; Generalized Body Aches (neck, can't swallow ); and Chest Pain    HPI  He complains of severe chest pain  Review of Systems   Constitutional: Negative for chills, fatigue, fever and unexpected weight change.   Respiratory: Negative for chest tightness and shortness of breath.    Cardiovascular: Positive for chest pain. Negative for palpitations.   Gastrointestinal: Negative for abdominal pain and blood in stool.   Neurological: Negative for dizziness, syncope, numbness and headaches.       Objective:      Physical Exam   HENT:   Right Ear: External ear normal.   Left Ear: External ear normal.   Nose: Nose normal.   Mouth/Throat: Oropharynx is clear and moist.   Eyes: Pupils are equal, round, and reactive to light.   Neck: Normal range of motion.   Cardiovascular: Normal rate and regular rhythm.   No murmur heard.  Pulmonary/Chest: Breath sounds normal.   Abdominal: He exhibits no distension. There is no hepatomegaly. There is no tenderness.   Lymphadenopathy:     He has no cervical adenopathy.     He has no axillary adenopathy.   Neurological: He has normal strength and normal reflexes. No cranial nerve deficit or sensory deficit.       Assessment/Plan       Assessment and plan:  Chest pain:  Patient transferred to emergency room for further evaluation

## 2018-09-14 ENCOUNTER — TELEPHONE (OUTPATIENT)
Dept: INTERNAL MEDICINE | Facility: CLINIC | Age: 68
End: 2018-09-14

## 2018-09-14 DIAGNOSIS — M1A.0720 IDIOPATHIC CHRONIC GOUT OF LEFT FOOT WITHOUT TOPHUS: Primary | ICD-10-CM

## 2018-09-14 RX ORDER — COLCHICINE 0.6 MG/1
0.6 TABLET ORAL DAILY
Qty: 20 TABLET | Refills: 0 | Status: SHIPPED | OUTPATIENT
Start: 2018-09-14 | End: 2018-10-04 | Stop reason: SDUPTHER

## 2018-09-14 NOTE — TELEPHONE ENCOUNTER
----- Message from Antonieta Barker sent at 9/14/2018  9:50 AM CDT -----  Contact: Sister Riana [phone]  Patient's sister is calling on behalf of her brother apparently he's suffering from gout and is not able to get out of bed. He's requesting a different medication than allopurinol (ZYLOPRIM) 100 MG tablet states this medication doesn't do anything for the pain. Please advise. Nohemy states that she will be in a meeting until 11am. Please leave a message.    Thank you

## 2018-09-17 ENCOUNTER — TELEPHONE (OUTPATIENT)
Dept: ELECTROPHYSIOLOGY | Facility: CLINIC | Age: 68
End: 2018-09-17

## 2018-09-17 ENCOUNTER — TELEPHONE (OUTPATIENT)
Dept: INTERNAL MEDICINE | Facility: CLINIC | Age: 68
End: 2018-09-17

## 2018-09-17 NOTE — TELEPHONE ENCOUNTER
----- Message from Kathy Mccall MA sent at 9/17/2018 12:16 PM CDT -----      ----- Message -----  From: Teofilo Pal MD  Sent: 9/15/2018   7:54 PM  To: Kathy Mccall MA    OK   Interesting ...  ----- Message -----  From: Kathy Mccall MA  Sent: 9/11/2018  12:33 PM  To: Teofilo Pal MD        ----- Message -----  From: Hannah Meraz RN  Sent: 9/11/2018  11:31 AM  To: Kae GEORGE Staff    ,    I spoke with patient to attempt to reschedule ICD upgrade/lead revision. Patient does not wish to proceed at this time. Patient states he needs to get his gout, neck problems and headaches under control first. Patient will call office when ready to proceed.    Thank you,  Hannah Meraz RN

## 2018-09-17 NOTE — TELEPHONE ENCOUNTER
----- Message from Kimberley Alonso sent at 9/17/2018 11:21 AM CDT -----  Contact: [place] [phone]  Prescription Alternative Needed:     The pharmacy needs alternative on the following RX:    colchicine (COLCRYS) 0.6 mg tablet    Reason: Drug not covered. Preferred alternative Colcrys, Mitigare    Pharmacy: [facility] [address] [place] [address] [address]  AT [facility] & [address]    Please advise.    Thank You

## 2018-09-18 ENCOUNTER — TELEPHONE (OUTPATIENT)
Dept: INTERNAL MEDICINE | Facility: CLINIC | Age: 68
End: 2018-09-18

## 2018-09-18 DIAGNOSIS — I47.20 VT (VENTRICULAR TACHYCARDIA): ICD-10-CM

## 2018-09-18 NOTE — TELEPHONE ENCOUNTER
----- Message from Carlotta Kowalski sent at 9/18/2018 11:12 AM CDT -----  [phone]  Caller would like a call back in regards to PA for colchicine (COLCRYS) 0.6 mg tablet. Caller states she has a few questions to process Prior Authorization. Please call and advise

## 2018-09-19 RX ORDER — AMIODARONE HYDROCHLORIDE 200 MG/1
TABLET ORAL
Qty: 135 TABLET | Refills: 0 | Status: SHIPPED | OUTPATIENT
Start: 2018-09-19 | End: 2018-12-14

## 2018-09-23 ENCOUNTER — HOSPITAL ENCOUNTER (EMERGENCY)
Facility: HOSPITAL | Age: 68
Discharge: HOME | End: 2018-09-23
Attending: EMERGENCY MEDICINE
Payer: MEDICARE

## 2018-09-23 VITALS
RESPIRATION RATE: 18 BRPM | SYSTOLIC BLOOD PRESSURE: 122 MMHG | HEART RATE: 79 BPM | DIASTOLIC BLOOD PRESSURE: 64 MMHG | OXYGEN SATURATION: 98 % | TEMPERATURE: 99 F

## 2018-09-23 DIAGNOSIS — K57.92 DIVERTICULITIS: Primary | ICD-10-CM

## 2018-09-23 DIAGNOSIS — R74.01 TRANSAMINITIS: ICD-10-CM

## 2018-09-23 LAB
ALBUMIN SERPL BCP-MCNC: 3.2 G/DL
ALP SERPL-CCNC: 157 U/L
ALT SERPL W/O P-5'-P-CCNC: 62 U/L
ANION GAP SERPL CALC-SCNC: 11 MMOL/L
AST SERPL-CCNC: 43 U/L
BASOPHILS # BLD AUTO: 0.09 K/UL
BASOPHILS NFR BLD: 1 %
BILIRUB SERPL-MCNC: 0.4 MG/DL
BILIRUB UR QL STRIP: NEGATIVE
BUN SERPL-MCNC: 21 MG/DL
CALCIUM SERPL-MCNC: 9.2 MG/DL
CHLORIDE SERPL-SCNC: 107 MMOL/L
CLARITY UR REFRACT.AUTO: CLEAR
CO2 SERPL-SCNC: 25 MMOL/L
COLOR UR AUTO: YELLOW
CREAT SERPL-MCNC: 1.3 MG/DL
DIFFERENTIAL METHOD: ABNORMAL
EOSINOPHIL # BLD AUTO: 0.2 K/UL
EOSINOPHIL NFR BLD: 2.3 %
ERYTHROCYTE [DISTWIDTH] IN BLOOD BY AUTOMATED COUNT: 14.2 %
EST. GFR  (AFRICAN AMERICAN): >60 ML/MIN/1.73 M^2
EST. GFR  (NON AFRICAN AMERICAN): 56.9 ML/MIN/1.73 M^2
GLUCOSE SERPL-MCNC: 97 MG/DL
GLUCOSE UR QL STRIP: NEGATIVE
HCT VFR BLD AUTO: 43.6 %
HGB BLD-MCNC: 13.6 G/DL
HGB UR QL STRIP: NEGATIVE
IMM GRANULOCYTES # BLD AUTO: 0.11 K/UL
IMM GRANULOCYTES NFR BLD AUTO: 1.3 %
KETONES UR QL STRIP: NEGATIVE
LEUKOCYTE ESTERASE UR QL STRIP: NEGATIVE
LIPASE SERPL-CCNC: 74 U/L
LYMPHOCYTES # BLD AUTO: 2.1 K/UL
LYMPHOCYTES NFR BLD: 24.6 %
MCH RBC QN AUTO: 28.7 PG
MCHC RBC AUTO-ENTMCNC: 31.2 G/DL
MCV RBC AUTO: 92 FL
MONOCYTES # BLD AUTO: 0.9 K/UL
MONOCYTES NFR BLD: 9.8 %
NEUTROPHILS # BLD AUTO: 5.3 K/UL
NEUTROPHILS NFR BLD: 61 %
NITRITE UR QL STRIP: NEGATIVE
NRBC BLD-RTO: 0 /100 WBC
PH UR STRIP: 5 [PH] (ref 5–8)
PLATELET # BLD AUTO: 471 K/UL
PMV BLD AUTO: 10.5 FL
POTASSIUM SERPL-SCNC: 3.2 MMOL/L
PROT SERPL-MCNC: 6.9 G/DL
PROT UR QL STRIP: NEGATIVE
RBC # BLD AUTO: 4.74 M/UL
SODIUM SERPL-SCNC: 143 MMOL/L
SP GR UR STRIP: 1.01 (ref 1–1.03)
URN SPEC COLLECT METH UR: NORMAL
UROBILINOGEN UR STRIP-ACNC: NEGATIVE EU/DL
WBC # BLD AUTO: 8.71 K/UL

## 2018-09-23 PROCEDURE — 85025 COMPLETE CBC W/AUTO DIFF WBC: CPT

## 2018-09-23 PROCEDURE — 99284 EMERGENCY DEPT VISIT MOD MDM: CPT | Mod: 25,NTX

## 2018-09-23 PROCEDURE — 83690 ASSAY OF LIPASE: CPT

## 2018-09-23 PROCEDURE — 96360 HYDRATION IV INFUSION INIT: CPT | Mod: NTX

## 2018-09-23 PROCEDURE — 80053 COMPREHEN METABOLIC PANEL: CPT

## 2018-09-23 PROCEDURE — 25000003 PHARM REV CODE 250: Performed by: PHYSICIAN ASSISTANT

## 2018-09-23 PROCEDURE — 99284 EMERGENCY DEPT VISIT MOD MDM: CPT | Mod: ,,, | Performed by: PHYSICIAN ASSISTANT

## 2018-09-23 PROCEDURE — 81003 URINALYSIS AUTO W/O SCOPE: CPT

## 2018-09-23 RX ORDER — ONDANSETRON 2 MG/ML
4 INJECTION INTRAMUSCULAR; INTRAVENOUS
Status: DISCONTINUED | OUTPATIENT
Start: 2018-09-23 | End: 2018-09-23

## 2018-09-23 RX ORDER — CIPROFLOXACIN 500 MG/1
500 TABLET ORAL 2 TIMES DAILY
Qty: 14 TABLET | Refills: 0 | Status: SHIPPED | OUTPATIENT
Start: 2018-09-23 | End: 2018-09-30

## 2018-09-23 RX ORDER — METRONIDAZOLE 500 MG/1
500 TABLET ORAL 3 TIMES DAILY
Qty: 21 TABLET | Refills: 0 | Status: SHIPPED | OUTPATIENT
Start: 2018-09-23 | End: 2018-09-30

## 2018-09-23 RX ORDER — METRONIDAZOLE 500 MG/1
500 TABLET ORAL
Status: COMPLETED | OUTPATIENT
Start: 2018-09-23 | End: 2018-09-23

## 2018-09-23 RX ORDER — CIPROFLOXACIN 500 MG/1
500 TABLET ORAL
Status: COMPLETED | OUTPATIENT
Start: 2018-09-23 | End: 2018-09-23

## 2018-09-23 RX ADMIN — SODIUM CHLORIDE 500 ML: 0.9 INJECTION, SOLUTION INTRAVENOUS at 03:09

## 2018-09-23 RX ADMIN — METRONIDAZOLE 500 MG: 500 TABLET ORAL at 04:09

## 2018-09-23 RX ADMIN — CIPROFLOXACIN HYDROCHLORIDE 500 MG: 500 TABLET, FILM COATED ORAL at 04:09

## 2018-09-23 NOTE — ED PROVIDER NOTES
Encounter Date: 9/23/2018       History     Chief Complaint   Patient presents with    Abdominal Pain     Pt presents to ED c/o lower abd pain and diarrhea x 3 days     Patient is a 68 year old male with PMHX of combined CHF with 20%EF, CAD on Plavix 75 mg, hx of cardiac stent, AICD, HTN, HLD, diverticulosis, hx of PE, and hx of DVT. He presents to the ED for abdominal pain. He reports having lower quadrant abdominal pain for approximately three days. Describes pain as constant and soreness. Rates pain 8/10. Reports associated diarrhea. Denies recent antibiotic use, raw foods, or recent travel out of the [place] Denies melena or BRBPR. He denies fever,chills, nausea, vomiting, sob, chest pain, dysuria, or constipation. He is a non smoker and denies alcohol use.          Review of patient's allergies indicates:   Allergen Reactions    Iodine containing multivitamin Swelling     itching    Keflex [cephalexin] Swelling     eyes    Peaches [peach (prunus persica)] Swelling     eyes    Shellfish containing products Swelling    Fig tree Swelling     itching    Tuberculin spenser test ppd Rash     Past Medical History:   Diagnosis Date    AICD (automatic cardioverter/defibrillator) present 12/13/2015      Chronic anticoagulation 5/5/2016    Chronic combined systolic and diastolic heart failure 11/26/2012    EF 10-20% on ECHO 2013    Clotting disorder     Coronary artery disease involving native coronary artery of native heart without angina pectoris 11/26/2012    Cath 10- Stents patent non-obstructive disease Cath 11-12015 non-obstructive disease     Diverticulosis of colon     DVT (deep venous thrombosis), unspecified laterality 11/12/2015    Essential hypertension 11/15/2015    Hyperlipidemia     Hypertensive heart disease with heart failure 5/5/2016    MI (myocardial infarction) 2009    x's 5    Nicotine abuse     Obesity 11/26/2012    Pulmonary embolism 2011    Renal disorder     LAVERNE     Stented coronary artery 2012    LAD stent placed 10/17/2007      Past Surgical History:   Procedure Laterality Date    APPENDECTOMY      BACK SURGERY      CARDIAC DEFIBRILLATOR PLACEMENT      CARDIAC SURGERY  2007    stent    CARPAL TUNNEL RELEASE Right 2018    Embolectomy Right 2017    Performed by Low [facility] at [facility] OR 2ND FLR    HEART CATH-LEFT Left 2015    Performed by Britton Restrepo MD at [facility] CATH LAB    HEART CATH-RIGHT Right 7/10/2017    Performed by Edison Marshall MD at [facility] CATH LAB    IRRIGATION AND DEBRIDEMENT UPPER EXTREMITY - LEFT INDEX FINGER Left 2016    Performed by Cornell Miguel MD at [facility] OR 2ND FLR    r knee scope      RELEASE-CARPAL TUNNEL right, right thumb TFR Right 2018    [facility]    RELEASE-FINGER-TRIGGER right thumb Right 2018    [facility] at [facility] OR    SPINE SURGERY      TONSILLECTOMY      TRIGGER FINGER RELEASE Right 2018    thumb     Family History   Problem Relation Age of Onset    Cancer Mother         colon cancer    Heart disease Mother     Diabetes Mother     Heart disease Father     Stroke Father     Colon polyps Father     Cancer Paternal Grandmother         leukemia    No Known Problems Sister     No Known Problems Daughter     No Known Problems Son     No Known Problems Sister     No Known Problems Son      Social History     Tobacco Use    Smoking status: Former Smoker     Packs/day: 1.00     Years: 45.00     Pack years: 45.00     Types: Cigarettes     Last attempt to quit: 2005     Years since quittin.7    Smokeless tobacco: Never Used    Tobacco comment: 1-1.5 ppd every day.   Substance Use Topics    Alcohol use: No     Frequency: Never    Drug use: No     Review of Systems   Constitutional: Negative for fever.   HENT: Negative for sore throat.    Respiratory: Negative for shortness of breath.     Cardiovascular: Negative for chest pain.   Gastrointestinal: Positive for abdominal pain and diarrhea. Negative for nausea and vomiting.   Genitourinary: Negative for dysuria.   Musculoskeletal: Negative for back pain.   Skin: Negative for rash.   Neurological: Negative for weakness.   Hematological: Does not bruise/bleed easily.       Physical Exam     Initial Vitals [09/23/18 0150]   BP Pulse Resp Temp SpO2   (!) 107/56 78 20 98.2 °F (36.8 °C) 96 %      MAP       --         Physical Exam    Vitals reviewed.  Constitutional: He appears well-developed and well-nourished. No distress.   Patient resting comfortably in NAD on RA.    HENT:   Head: Normocephalic.   Eyes: Conjunctivae and EOM are normal. Pupils are equal, round, and reactive to light.   Neck: Normal range of motion.   Cardiovascular: Normal rate and regular rhythm.   Murmur heard.  Pulmonary/Chest: Breath sounds normal. No respiratory distress. He has no wheezes. He has no rales.   Abdominal: Soft. Bowel sounds are normal. He exhibits no distension. There is tenderness in the right lower quadrant and suprapubic area.   Musculoskeletal: Normal range of motion.   Neurological: He is alert and oriented to person, place, and time.   Skin: Skin is warm and dry. No erythema.         ED Course   Procedures  Labs Reviewed   CBC W/ AUTO DIFFERENTIAL - Abnormal; Notable for the following components:       Result Value    Hemoglobin 13.6 (*)     MCHC 31.2 (*)     Platelets 471 (*)     Immature Granulocytes 1.3 (*)     Immature Grans (Abs) 0.11 (*)     All other components within normal limits   COMPREHENSIVE METABOLIC PANEL - Abnormal; Notable for the following components:    Potassium 3.2 (*)     Albumin 3.2 (*)     Alkaline Phosphatase 157 (*)     AST 43 (*)     ALT 62 (*)     eGFR if non  56.9 (*)     All other components within normal limits   LIPASE - Abnormal; Notable for the following components:    Lipase 74 (*)     All other components  within normal limits   URINALYSIS, REFLEX TO URINE CULTURE    Narrative:     Preferred Collection Type->Urine, Clean Catch          Imaging Results          CT Abdomen Pelvis  Without Contrast (Final result)  Result time 09/23/18 04:32:09    Final result by Cisco Arechiga MD (09/23/18 04:32:09)                 Impression:      1.  Findings suggestive of acute uncomplicated sigmoid diverticulitis.  No evidence of abscess or free intraperitoneal air.    2.  Nonspecific linear hyperdensity within the left posterolateral aspect of the bladder lumen near the left ureteral orifice.  This is nonspecific and could represent debris or calcification within the urinary bladder.  Correlation with urinalysis is advised, follow-up assessment with direct visualization as clinically warranted.    3.  Mild prostatomegaly.    4.  Small fat containing supraumbilical hernia.    5.  Additional incidental findings as above.      Electronically signed by: Cisco Arechiga MD  Date:    09/23/2018  Time:    04:32             Narrative:    EXAMINATION:  CT ABDOMEN PELVIS WITHOUT CONTRAST    CLINICAL HISTORY:  LLQ pain, suspect diverticulitis;    TECHNIQUE:  Low dose axial images, sagittal and coronal reformations were obtained from the lung bases to the pubic symphysis without IV contrast.  Oral contrast was not administered.    COMPARISON:  CT 12/06/2015    FINDINGS:  The visualized lung bases are unremarkable.  Small left-sided Bochdalek hernia is again noted.  There are partially visualized transvenous pacing leads.  No significant pericardial effusion.    The liver demonstrates no focal hepatic abnormality on this limited noncontrast examination.  The gallbladder shows no evidence of stones or pericholecystic fluid.  There is no intra-or extrahepatic biliary ductal dilatation.    The stomach, spleen, pancreas, and adrenal glands are unremarkable.    The kidneys are normal in size and location.  There is no evidence of hydronephrosis  or nephrolithiasis.  No obstructing calculi are identified within either ureter.  There is linear calcific density within the left posterolateral aspect of the bladder lumen near the left ureteral orifice.  This is nonspecific and could represent debris or calcification within the urinary bladder.  Correlation with urinalysis is advised.  There are bilateral fat containing inguinal hernias.  The prostate is mildly enlarged.    The abdominal aorta is normal in course and caliber with significant atherosclerotic calcification along its course.    The sigmoid colon is tortuous.  There is mild pericolonic inflammatory change and wall thickening involving the sigmoid colon within the right paramidline abdomen compatible with mild acute uncomplicated diverticulitis.  No evidence of free intraperitoneal air or abscess.  The remaining small and large bowel demonstrates no evidence of inflammation or obstruction.  There is a fat containing supraumbilical hernia.  There is no ascites or portal venous gas.    The osseous structures demonstrate degenerative changes of the spine.  The extraperitoneal soft tissues are unremarkable.                                       APC / Resident Notes:   Patient is a 68 year old male with PMHX of combined CHF with 20%EF, CAD on Plavix 75 mg, hx of cardiac stent, AICD, HTN, HLD, diverticulosis, hx of PE, and hx of DVT. He presents to the ED for abdominal pain.    2:11 AM  Patient not within examination room for assessment.     Will order labs and imaging. Will order IVF. Will continue to monitor.     Differential diagnoses include, but are not limited to: diverticulitis, gastroenteritis, LAVERNE, or electrolyte imbalance.     No leukocytosis. Hemodynamically stable. Thrombocytosis. Elevated ALK PHOS 157. Elevated AST 43 and ALT 62. Minimally elevated lipase 74. UA unremarkable for infectious process. CT abdomen pelvis found to have Findings suggestive of acute uncomplicated sigmoid  diverticulitis.  No evidence of abscess or free intraperitoneal air. Will order cipro and flagyl PO, while in ED.     Patient reassessed, reports symptoms improved with ED management. I have discussed emergency department findings, and plan with the patient. Will discharge home with cipro and flagyl and F/U with GI and PCP. Patient verbalizes understanding of plan and agrees. Return precautions given.     I have discussed and reviewed with my supervising physician.        Clinical Impression:   The primary encounter diagnosis was Diverticulitis. A diagnosis of Transaminitis was also pertinent to this visit.      Disposition:   Disposition: Discharged  Condition: Stable                        Mary Vanegas PA-C  09/23/18 0713

## 2018-10-01 ENCOUNTER — TELEPHONE (OUTPATIENT)
Dept: ELECTROPHYSIOLOGY | Facility: CLINIC | Age: 68
End: 2018-10-01

## 2018-10-01 NOTE — TELEPHONE ENCOUNTER
----- Message from Kathy Mccall MA sent at 10/1/2018  3:35 PM CDT -----  Contact: pt  I didn't see what he's needing to reschedule.  ----- Message -----  From: Marlena Davis  Sent: 10/1/2018  11:03 AM  To: Kae GEORGE Staff    Pt would like a call to rs his surgery appt    Thanks

## 2018-10-01 NOTE — TELEPHONE ENCOUNTER
Spoke with patient's friend- Riana- patient is not with her at this time. Riana says that patients phone is disconnected, she will see Patient this afternoon and ask him to call me tomorrow.      Jessie TALAMANTES CCM

## 2018-10-01 NOTE — TELEPHONE ENCOUNTER
,    Patient called to re-schedule his EP procedure with you. He was scheduled for ICD upgrade with either HIS lead or revision of LV lead, has ICD dual [employer]. MAC anesthesia, contrast prep, full body prep. On plavix for H/O DVT.    IS this still accurate?      Jessie TALAMANTES CCM

## 2018-10-03 ENCOUNTER — DOCUMENTATION ONLY (OUTPATIENT)
Dept: ELECTROPHYSIOLOGY | Facility: CLINIC | Age: 68
End: 2018-10-03

## 2018-10-04 DIAGNOSIS — M1A.0720 IDIOPATHIC CHRONIC GOUT OF LEFT FOOT WITHOUT TOPHUS: ICD-10-CM

## 2018-10-04 RX ORDER — COLCHICINE 0.6 MG/1
TABLET, FILM COATED ORAL
Qty: 20 TABLET | Refills: 0 | Status: SHIPPED | OUTPATIENT
Start: 2018-10-04 | End: 2018-11-14 | Stop reason: SDUPTHER

## 2018-10-10 ENCOUNTER — TELEPHONE (OUTPATIENT)
Dept: ELECTROPHYSIOLOGY | Facility: CLINIC | Age: 68
End: 2018-10-10

## 2018-10-10 DIAGNOSIS — I25.5 CARDIOMYOPATHY, ISCHEMIC: Primary | Chronic | ICD-10-CM

## 2018-10-10 NOTE — TELEPHONE ENCOUNTER
Attempt to call patient, called all phone numbers listed on the patient's demographics, no answer- left a message with possible date for EP procedure.      Jessie TALAMANTES CCM

## 2018-10-11 ENCOUNTER — TELEPHONE (OUTPATIENT)
Dept: ELECTROPHYSIOLOGY | Facility: CLINIC | Age: 68
End: 2018-10-11

## 2018-10-11 NOTE — TELEPHONE ENCOUNTER
Attempt to call patient, called all phone numbers listed on the patient's demographics, no answer- left a message with my phone number requesting a return call. Jessie TALAMANTES CCM

## 2018-10-23 ENCOUNTER — TELEPHONE (OUTPATIENT)
Dept: ELECTROPHYSIOLOGY | Facility: CLINIC | Age: 68
End: 2018-10-23

## 2018-10-23 ENCOUNTER — CLINICAL SUPPORT (OUTPATIENT)
Dept: ELECTROPHYSIOLOGY | Facility: CLINIC | Age: 68
End: 2018-10-23
Payer: MEDICARE

## 2018-10-23 DIAGNOSIS — I25.5 ISCHEMIC CARDIOMYOPATHY: ICD-10-CM

## 2018-10-23 DIAGNOSIS — Z95.810 AICD (AUTOMATIC CARDIOVERTER/DEFIBRILLATOR) PRESENT: ICD-10-CM

## 2018-10-23 NOTE — TELEPHONE ENCOUNTER
Attempt to call patient, called all phone numbers listed on the patient's demographics, no answer- left a message with my phone number requesting a return call- notified that I was attempting to reach patient to get ICD upgrade procedure scheduled. Jessie TALAMANTES CCM

## 2018-10-24 PROCEDURE — 93296 REM INTERROG EVL PM/IDS: CPT | Mod: S$GLB,,, | Performed by: INTERNAL MEDICINE

## 2018-10-24 PROCEDURE — 93295 DEV INTERROG REMOTE 1/2/MLT: CPT | Mod: S$GLB,,, | Performed by: INTERNAL MEDICINE

## 2018-11-04 DIAGNOSIS — M1A.0720 IDIOPATHIC CHRONIC GOUT OF LEFT FOOT WITHOUT TOPHUS: ICD-10-CM

## 2018-11-05 RX ORDER — FUROSEMIDE 40 MG/1
TABLET ORAL
Qty: 90 TABLET | Refills: 0 | Status: ON HOLD | OUTPATIENT
Start: 2018-11-05 | End: 2019-01-15 | Stop reason: HOSPADM

## 2018-11-05 RX ORDER — ALLOPURINOL 100 MG/1
TABLET ORAL
Qty: 90 TABLET | Refills: 0 | Status: SHIPPED | OUTPATIENT
Start: 2018-11-05 | End: 2019-02-02 | Stop reason: SDUPTHER

## 2018-11-11 ENCOUNTER — HOSPITAL ENCOUNTER (EMERGENCY)
Facility: HOSPITAL | Age: 68
Discharge: HOME | End: 2018-11-11
Attending: EMERGENCY MEDICINE
Payer: MEDICARE

## 2018-11-11 VITALS
HEIGHT: 67 IN | DIASTOLIC BLOOD PRESSURE: 68 MMHG | SYSTOLIC BLOOD PRESSURE: 128 MMHG | RESPIRATION RATE: 20 BRPM | OXYGEN SATURATION: 98 % | BODY MASS INDEX: 36.49 KG/M2 | HEART RATE: 67 BPM

## 2018-11-11 DIAGNOSIS — E87.70 HYPERVOLEMIA, UNSPECIFIED HYPERVOLEMIA TYPE: Primary | ICD-10-CM

## 2018-11-11 DIAGNOSIS — R06.02 SHORTNESS OF BREATH: ICD-10-CM

## 2018-11-11 LAB
ALBUMIN SERPL BCP-MCNC: 3.2 G/DL
ALP SERPL-CCNC: 103 U/L
ALT SERPL W/O P-5'-P-CCNC: 35 U/L
ANION GAP SERPL CALC-SCNC: 10 MMOL/L
AST SERPL-CCNC: 23 U/L
BASOPHILS # BLD AUTO: 0.06 K/UL
BASOPHILS NFR BLD: 0.7 %
BILIRUB SERPL-MCNC: 0.4 MG/DL
BNP SERPL-MCNC: 310 PG/ML
BUN SERPL-MCNC: 28 MG/DL
CALCIUM SERPL-MCNC: 8.6 MG/DL
CHLORIDE SERPL-SCNC: 106 MMOL/L
CO2 SERPL-SCNC: 24 MMOL/L
CREAT SERPL-MCNC: 1.1 MG/DL
DIFFERENTIAL METHOD: ABNORMAL
EOSINOPHIL # BLD AUTO: 0.2 K/UL
EOSINOPHIL NFR BLD: 1.9 %
ERYTHROCYTE [DISTWIDTH] IN BLOOD BY AUTOMATED COUNT: 14.6 %
EST. GFR  (AFRICAN AMERICAN): >60 ML/MIN/1.73 M^2
EST. GFR  (NON AFRICAN AMERICAN): >60 ML/MIN/1.73 M^2
GLUCOSE SERPL-MCNC: 118 MG/DL
HCT VFR BLD AUTO: 42.3 %
HGB BLD-MCNC: 13 G/DL
IMM GRANULOCYTES # BLD AUTO: 0.06 K/UL
IMM GRANULOCYTES NFR BLD AUTO: 0.7 %
LYMPHOCYTES # BLD AUTO: 2.7 K/UL
LYMPHOCYTES NFR BLD: 30 %
MCH RBC QN AUTO: 27.7 PG
MCHC RBC AUTO-ENTMCNC: 30.7 G/DL
MCV RBC AUTO: 90 FL
MONOCYTES # BLD AUTO: 0.9 K/UL
MONOCYTES NFR BLD: 9.6 %
NEUTROPHILS # BLD AUTO: 5.2 K/UL
NEUTROPHILS NFR BLD: 57.1 %
NRBC BLD-RTO: 0 /100 WBC
PLATELET # BLD AUTO: 309 K/UL
PMV BLD AUTO: 10.6 FL
POTASSIUM SERPL-SCNC: 4 MMOL/L
PROT SERPL-MCNC: 6 G/DL
RBC # BLD AUTO: 4.69 M/UL
SODIUM SERPL-SCNC: 140 MMOL/L
TROPONIN I SERPL DL<=0.01 NG/ML-MCNC: 0.04 NG/ML
TROPONIN I SERPL DL<=0.01 NG/ML-MCNC: 0.04 NG/ML
WBC # BLD AUTO: 9.02 K/UL

## 2018-11-11 PROCEDURE — 96374 THER/PROPH/DIAG INJ IV PUSH: CPT

## 2018-11-11 PROCEDURE — 93005 ELECTROCARDIOGRAM TRACING: CPT

## 2018-11-11 PROCEDURE — 25000003 PHARM REV CODE 250: Performed by: STUDENT IN AN ORGANIZED HEALTH CARE EDUCATION/TRAINING PROGRAM

## 2018-11-11 PROCEDURE — 63600175 PHARM REV CODE 636 W HCPCS: Performed by: STUDENT IN AN ORGANIZED HEALTH CARE EDUCATION/TRAINING PROGRAM

## 2018-11-11 PROCEDURE — 93010 ELECTROCARDIOGRAM REPORT: CPT | Mod: ,,, | Performed by: INTERNAL MEDICINE

## 2018-11-11 PROCEDURE — 84484 ASSAY OF TROPONIN QUANT: CPT | Mod: 91

## 2018-11-11 PROCEDURE — 99284 EMERGENCY DEPT VISIT MOD MDM: CPT | Mod: ,,, | Performed by: EMERGENCY MEDICINE

## 2018-11-11 PROCEDURE — 99284 EMERGENCY DEPT VISIT MOD MDM: CPT | Mod: 25

## 2018-11-11 PROCEDURE — 80053 COMPREHEN METABOLIC PANEL: CPT

## 2018-11-11 PROCEDURE — 85025 COMPLETE CBC W/AUTO DIFF WBC: CPT

## 2018-11-11 PROCEDURE — 83880 ASSAY OF NATRIURETIC PEPTIDE: CPT

## 2018-11-11 RX ORDER — ASPIRIN 325 MG
TABLET ORAL
Status: DISPENSED
Start: 2018-11-11 | End: 2018-11-11

## 2018-11-11 RX ORDER — FUROSEMIDE 10 MG/ML
40 INJECTION INTRAMUSCULAR; INTRAVENOUS
Status: COMPLETED | OUTPATIENT
Start: 2018-11-11 | End: 2018-11-11

## 2018-11-11 RX ORDER — ASPIRIN 325 MG
325 TABLET ORAL
Status: COMPLETED | OUTPATIENT
Start: 2018-11-11 | End: 2018-11-11

## 2018-11-11 RX ADMIN — FUROSEMIDE 40 MG: 10 INJECTION, SOLUTION INTRAMUSCULAR; INTRAVENOUS at 02:11

## 2018-11-11 RX ADMIN — ASPIRIN 325 MG ORAL TABLET 325 MG: 325 PILL ORAL at 01:11

## 2018-11-11 NOTE — ED TRIAGE NOTES
Audery ARIEL Oneil Jr., a 68 y.o. male presents to the ED w/ complaint of    Triage note:  Chief Complaint   Patient presents with    Shortness of Breath     Patient reports he has been having shortness of breath tonight, denies chest pain. reports he took 1 NTG PTA and an aspirin. Pt reports a hx of 5 MIs. Pt has an ICD     Review of patient's allergies indicates:   Allergen Reactions    Iodine containing multivitamin Swelling     itching    Keflex [cephalexin] Swelling     eyes    Peaches [peach (prunus persica)] Swelling     eyes    Shellfish containing products Swelling    Fig tree Swelling     itching    Tuberculin spenser test ppd Rash     Past Medical History:   Diagnosis Date    AICD (automatic cardioverter/defibrillator) present 12/13/2015      Chronic anticoagulation 5/5/2016    Chronic combined systolic and diastolic heart failure 11/26/2012    EF 10-20% on ECHO 2013    Clotting disorder     Coronary artery disease involving native coronary artery of native heart without angina pectoris 11/26/2012    Cath 10- Stents patent non-obstructive disease Cath 11-12015 non-obstructive disease     Diverticulosis of colon     DVT (deep venous thrombosis), unspecified laterality 11/12/2015    Essential hypertension 11/15/2015    Hyperlipidemia     Hypertensive heart disease with heart failure 5/5/2016    MI (myocardial infarction) 2009    x's 5    Nicotine abuse     Obesity 11/26/2012    Pulmonary embolism 2011    Renal disorder     LAVERNE    Stented coronary artery 11/26/2012    LAD stent placed 10/17/2007

## 2018-11-11 NOTE — DISCHARGE INSTRUCTIONS
Please call your PCP to schedule a close follow up appointment.  Continue taking  your Lasix regimen at home.

## 2018-11-11 NOTE — ED PROVIDER NOTES
Encounter Date: 11/11/2018       History     Chief Complaint   Patient presents with    Shortness of Breath     Patient reports he has been having shortness of breath tonight, denies chest pain. reports he took 1 NTG PTA and an aspirin. Pt reports a hx of 5 MIs. Pt has an ICD     HPI   68 year old  man with CAD and history of several myocardial infarctions status post AICD placement, history of DVT/PE, and chronic CHF with baseline EF of 15-20% presenting for acute shortness of breath noted approximately 45 minutes prior to arrival. Patient reported new onset orthopnea and mild chest pressure that improved with 0.4 mg SL nitroglycerin at home. He is complaining of shortness of breath and orthopnea at this time. The chest pressure is no longer as pronounced. He denies new leg swelling. He reports that his medication regimen involving Lasix and Spironolactone was modified recently. He ate a tuna sandwich, homemade chicken soup without salt, and Afghan toast today.     Review of patient's allergies indicates:   Allergen Reactions    Iodine containing multivitamin Swelling     itching    Keflex [cephalexin] Swelling     eyes    Peaches [peach (prunus persica)] Swelling     eyes    Shellfish containing products Swelling    Fig tree Swelling     itching    Tuberculin spenser test ppd Rash     Past Medical History:   Diagnosis Date    AICD (automatic cardioverter/defibrillator) present 12/13/2015      Chronic anticoagulation 5/5/2016    Chronic combined systolic and diastolic heart failure 11/26/2012    EF 10-20% on ECHO 2013    Clotting disorder     Coronary artery disease involving native coronary artery of native heart without angina pectoris 11/26/2012    Cath 10- Stents patent non-obstructive disease Cath 11-12015 non-obstructive disease     Diverticulosis of colon     DVT (deep venous thrombosis), unspecified laterality 11/12/2015    Essential hypertension 11/15/2015     Hyperlipidemia     Hypertensive heart disease with heart failure 2016    MI (myocardial infarction)     x's 5    Nicotine abuse     Obesity 2012    Pulmonary embolism     Renal disorder     LAEVRNE    Stented coronary artery 2012    LAD stent placed 10/17/2007      Past Surgical History:   Procedure Laterality Date    APPENDECTOMY      BACK SURGERY      CARDIAC DEFIBRILLATOR PLACEMENT      CARDIAC SURGERY  2007    stent    CARPAL TUNNEL RELEASE Right 2018    Embolectomy Right 2017    Performed [facility] at [facility] 2ND [facility]    HEART CATH-LEFT Left 2015    Performed by Britton Restrepo [facility] at [facility] CATH LAB    HEART CATH-RIGHT Right 7/10/2017    Performed by Edison Marshall MD at [facility] CATH LAB    IRRIGATION AND DEBRIDEMENT UPPER EXTREMITY - LEFT INDEX FINGER Left 2016    Performed by Cornell Miguel MD at [facility] OR 2ND [facility]LR    r knee scope      RELEASE-CARPAL TUNNEL right, right thumb TFR Right 2018    [facility] [place]    RELEASE-FINGER-TRIGGER right thumb Right 2018    [facility] at [facility] [place]    SPINE SURGERY      TONSILLECTOMY      TRIGGER FINGER RELEASE Right 2018    thumb     Family History   Problem Relation Age of Onset    Cancer Mother         colon cancer    Heart disease Mother     Diabetes Mother     Heart disease Father     Stroke Father     Colon polyps Father     Cancer Paternal Grandmother         leukemia    No Known Problems Sister     No Known Problems Daughter     No Known Problems Son     No Known Problems Sister     No Known Problems Son      Social History     Tobacco Use    Smoking status: Former Smoker     Packs/day: 1.00     Years: 45.00     Pack years: 45.00     Types: Cigarettes     Last attempt to quit: 2005     Years since quittin.9    Smokeless tobacco: Never Used    Tobacco comment: 1-1.5 ppd every day.   Substance Use  Topics    Alcohol use: No     Frequency: Never    Drug use: No     Review of Systems   Constitutional: Negative for chills, diaphoresis and fever.   HENT: Negative for congestion.    Eyes: Negative for visual disturbance.   Respiratory: Positive for shortness of breath.    Cardiovascular: Negative for palpitations and leg swelling.        Chest tightness PTA   Gastrointestinal: Negative for abdominal pain and nausea.   Endocrine: Negative for polyuria.   Genitourinary: Negative for difficulty urinating.   Musculoskeletal: Negative for arthralgias.   Neurological: Positive for weakness.   Psychiatric/Behavioral: Negative for agitation.       Physical Exam     Initial Vitals [11/11/18 0112]   BP Pulse Resp Temp SpO2   (!) 151/78 80 (!) 22 -- (!) 93 %      MAP       --         Physical Exam    Nursing note and vitals reviewed.  Constitutional: He appears distressed.   Obese and chronically ill appearing 68 year old  man sitting upright at 45 degrees in mild distress with accessory respiratory muscle use   HENT:   Head: Normocephalic and atraumatic.   Mouth/Throat: Oropharynx is clear and moist.   Eyes: EOM are normal.   Neck: Normal range of motion. Neck supple.   Cardiovascular: Normal rate, regular rhythm and intact distal pulses.   Pulmonary/Chest: He exhibits no mass and no tenderness.   Increased effort  Crackles present  Tachypneic  Bedside ultrasound with diffuse B-lines bilaterally   Abdominal: Soft. Bowel sounds are normal. There is no tenderness.   Protuberant abdomen   Musculoskeletal: Normal range of motion.   Neurological: He is alert and oriented to person, place, and time.   Skin: Skin is warm and dry. No erythema.   Psychiatric: He has a normal mood and affect.         ED Course   US - ED Performed - Echo Cardiac 2D  Date/Time: 11/11/2018 1:55 AM  Performed by: Balbir Wiggins MD  Authorized by: Gal Casillas III, MD   Comments: EF appears reduced with diffuse LV hypokinesis  RV  hyperdynamic  LV > RV diameter  No pericardial effusion  IVC could not be visualized  TAPSE measured at 2.2 cm  Diffuse B-lines bilaterally        Labs Reviewed   CBC W/ AUTO DIFFERENTIAL - Abnormal; Notable for the following components:       Result Value    Hemoglobin 13.0 (*)     MCHC 30.7 (*)     RDW 14.6 (*)     Immature Granulocytes 0.7 (*)     Immature Grans (Abs) 0.06 (*)     All other components within normal limits   TROPONIN I - Abnormal; Notable for the following components:    Troponin I 0.045 (*)     All other components within normal limits   B-TYPE NATRIURETIC PEPTIDE - Abnormal; Notable for the following components:     (*)     All other components within normal limits   COMPREHENSIVE METABOLIC PANEL - Abnormal; Notable for the following components:    Glucose 118 (*)     BUN, Bld 28 (*)     Calcium 8.6 (*)     Albumin 3.2 (*)     All other components within normal limits   TROPONIN I - Abnormal; Notable for the following components:    Troponin I 0.044 (*)     All other components within normal limits     EKG Readings: (Independently Interpreted)   Initial Reading: No STEMI. Previous EKG: Compared with most recent EKG Rhythm: Normal Sinus Rhythm.   Normal sinus rhythm at 75 beats per minute with ST depressions in II, III, aVF, V5, and V6 overall unchanged from prior study in August.       Imaging Results          X-Ray Chest 1 View (Final result)  Result time 11/11/18 02:19:53    Final result by Cisco Arechiga MD (11/11/18 02:19:53)                 Impression:      Cardiomegaly with accentuated interstitial markings bilaterally which may relate to interstitial edema/CHF in the appropriate clinical setting.      Electronically signed by: Cisco Arechiga MD  Date:    11/11/2018  Time:    02:19             Narrative:    EXAMINATION:  XR CHEST 1 VIEW    CLINICAL HISTORY:  Shortness of breath    TECHNIQUE:  Single frontal view of the chest was  performed.    COMPARISON:  09/10/2018    FINDINGS:  Cardiac monitoring leads overlie the chest.  There is a left chest wall cardiac pacing device present.  Cardiomediastinal silhouette is enlarged.  There are accentuated weighted interstitial markings bilaterally.  No new large confluent airspace consolidation identified.  No significant pleural effusion or pneumothorax identified.  The visualized osseous structures demonstrate stable appearing degenerative changes.                                 Medical Decision Making:   Initial Assessment:   68 year old  man with history of CAD with several Mis status post AICD placement, HTN, and DVT/PE currently on Plavix presenting for acute shortness of breath with orthopnea starting 45 minutes prior to arrival. Vitals on arrival notable for RR 22 with O2 sat 92% on RA with no prior history of home O2 requirement. Patient improved to 100% on 3L NC quickly. Exam and bedside US concerning for acute on chronic left-sided heart failure with pulmonary edema. ECG appears unchanged from prior assessment. Cardiac workup in process. Patient reports recent change in his home diuretic regimen. He reports no significant dietary changes and is avoiding salt. Presentation concerning for acute on chronic heart failure vs. High risk chest pain (elevated HEART score) vs. PE (less likely and currently anticoagulated).    Balbir Wiggins MD  11/11/2018; 0202    Update:  BNP returned mildly elevated at 310.  CXR showed interval increase in bilateral opacities consistent with fluid overload as seen on bedside ultrasound.  Patient reporting improvement in shortness of breath at this time.  However, he remains on 3L nasal cannula.  IV Lasix 40 mg ordered with condom catheter while pending repeat troponin at 0430.  With adequate diuresis and normal repeat troponin, patient may be discharged to home with close follow up.    Balbir Wiggins MD  11/11/2018; 0316    Update:  Repeat  troponin unchanged.  Patient diuresed > 1000  ML after Lasix and feels that his shortness of breath is resolved.  Will discharge with return precautions and recommended close PCP follow up.    Balbir Wiggins MD  11/11/2018; 0551              Attending Attestation:   Physician Attestation Statement for Resident:  As the supervising MD   Physician Attestation Statement: I have personally seen and examined this patient.   I agree with the above history. -: Patient complains of acute shortness of breath that began just prior to arrival.  History of CAD, CHF.   As the supervising MD I agree with the above PE.   -: Afebrile.  No clinical dehydration.  Mild respiratory distress with tachypnea, bibasilar rales.   As the supervising MD I agree with the above treatment, course, plan, and disposition.   -: Patient workup for ACS, CHF exacerbation.  A chest x-ray, cardiomegaly, mild interstitial changes.  No pleural effusions.  No consolidations.  EKG negative for dysrhythmia and acute ischemic changes.  Mild troponin elevation felt to be secondary to CHF and chronic.  Interval repeat showed slight improvement.                       Clinical Impression:   The primary encounter diagnosis was Hypervolemia, unspecified hypervolemia type. A diagnosis of Shortness of breath was also pertinent to this visit.      Disposition:   Disposition: Discharged  Condition: Stable                        Balbir Wiggins MD  Resident  11/11/18 [id number]       Gal Casillas III, MD  11/20/18 [id number]

## 2018-11-14 DIAGNOSIS — M1A.0720 IDIOPATHIC CHRONIC GOUT OF LEFT FOOT WITHOUT TOPHUS: ICD-10-CM

## 2018-11-14 RX ORDER — COLCHICINE 0.6 MG/1
TABLET ORAL
Qty: 20 TABLET | Refills: 0 | Status: SHIPPED | OUTPATIENT
Start: 2018-11-14 | End: 2019-03-22 | Stop reason: SDUPTHER

## 2018-11-27 ENCOUNTER — TELEPHONE (OUTPATIENT)
Dept: ORTHOPEDICS | Facility: CLINIC | Age: 68
End: 2018-11-27

## 2018-11-27 PROCEDURE — 99284 EMERGENCY DEPT VISIT MOD MDM: CPT | Mod: ,,, | Performed by: EMERGENCY MEDICINE

## 2018-11-27 PROCEDURE — 96375 TX/PRO/DX INJ NEW DRUG ADDON: CPT

## 2018-11-27 PROCEDURE — 99285 EMERGENCY DEPT VISIT HI MDM: CPT | Mod: 25

## 2018-11-27 PROCEDURE — 96365 THER/PROPH/DIAG IV INF INIT: CPT

## 2018-11-27 NOTE — TELEPHONE ENCOUNTER
----- Message from Meche Donaldson sent at 11/27/2018 10:41 AM CST -----  Contact: Pt Sister Riana  Name of Who is Calling: Pt Sister Riana    What is the request in detail: Pt Sister Riana called to advise that her brother has pain and swelling from his elbow to his right hand and wanted to be seen today or speak with a nurse to discuss his pain. Advised pt first available is 01/07 and pt is requesting a sooner appt or medical advice. Please advise.     Can the clinic reply by [facility]: No     What Number to Call Back if not in [facility]: Pt Sister Riana # [phone]

## 2018-11-27 NOTE — TELEPHONE ENCOUNTER
Spoke with pt sister , she states pt is having pain where he had surgery on his right hand. Pt wants to be seen ASAP for the pain. Appt made to see Vicky on Thursday at 10:15. Pt sister verbalized understanding.

## 2018-11-28 ENCOUNTER — HOSPITAL ENCOUNTER (OUTPATIENT)
Facility: HOSPITAL | Age: 68
Discharge: HOME | End: 2018-11-29
Attending: EMERGENCY MEDICINE | Admitting: INTERNAL MEDICINE
Payer: MEDICARE

## 2018-11-28 ENCOUNTER — TELEPHONE (OUTPATIENT)
Dept: ELECTROPHYSIOLOGY | Facility: CLINIC | Age: 68
End: 2018-11-28

## 2018-11-28 DIAGNOSIS — I10 ESSENTIAL HYPERTENSION: ICD-10-CM

## 2018-11-28 DIAGNOSIS — M25.539 WRIST PAIN: ICD-10-CM

## 2018-11-28 DIAGNOSIS — L03.90 CELLULITIS: Primary | ICD-10-CM

## 2018-11-28 DIAGNOSIS — I25.5 CARDIOMYOPATHY, ISCHEMIC: Chronic | ICD-10-CM

## 2018-11-28 DIAGNOSIS — I50.42 CHRONIC COMBINED SYSTOLIC AND DIASTOLIC HEART FAILURE: Chronic | ICD-10-CM

## 2018-11-28 DIAGNOSIS — I47.20 VT (VENTRICULAR TACHYCARDIA): ICD-10-CM

## 2018-11-28 LAB
ALBUMIN SERPL BCP-MCNC: 3.6 G/DL
ALP SERPL-CCNC: 116 U/L
ALT SERPL W/O P-5'-P-CCNC: 32 U/L
ANION GAP SERPL CALC-SCNC: 11 MMOL/L
AST SERPL-CCNC: 23 U/L
BASOPHILS # BLD AUTO: 0.06 K/UL
BASOPHILS NFR BLD: 0.7 %
BILIRUB SERPL-MCNC: 0.4 MG/DL
BILIRUB UR QL STRIP: NEGATIVE
BUN SERPL-MCNC: 22 MG/DL
CALCIUM SERPL-MCNC: 9 MG/DL
CHLORIDE SERPL-SCNC: 104 MMOL/L
CLARITY UR REFRACT.AUTO: CLEAR
CO2 SERPL-SCNC: 28 MMOL/L
COLOR UR AUTO: NORMAL
CREAT SERPL-MCNC: 1.1 MG/DL
CRP SERPL-MCNC: 11.7 MG/L
DIFFERENTIAL METHOD: ABNORMAL
EOSINOPHIL # BLD AUTO: 0.2 K/UL
EOSINOPHIL NFR BLD: 1.8 %
ERYTHROCYTE [DISTWIDTH] IN BLOOD BY AUTOMATED COUNT: 14.4 %
ERYTHROCYTE [SEDIMENTATION RATE] IN BLOOD BY WESTERGREN METHOD: 25 MM/HR
EST. GFR  (AFRICAN AMERICAN): >60 ML/MIN/1.73 M^2
EST. GFR  (NON AFRICAN AMERICAN): >60 ML/MIN/1.73 M^2
GLUCOSE SERPL-MCNC: 106 MG/DL
GLUCOSE UR QL STRIP: NEGATIVE
HCT VFR BLD AUTO: 44.5 %
HGB BLD-MCNC: 14.1 G/DL
HGB UR QL STRIP: NEGATIVE
IMM GRANULOCYTES # BLD AUTO: 0.06 K/UL
IMM GRANULOCYTES NFR BLD AUTO: 0.7 %
KETONES UR QL STRIP: NEGATIVE
LEUKOCYTE ESTERASE UR QL STRIP: NEGATIVE
LYMPHOCYTES # BLD AUTO: 2 K/UL
LYMPHOCYTES NFR BLD: 23.8 %
MCH RBC QN AUTO: 27.9 PG
MCHC RBC AUTO-ENTMCNC: 31.7 G/DL
MCV RBC AUTO: 88 FL
MONOCYTES # BLD AUTO: 0.9 K/UL
MONOCYTES NFR BLD: 11 %
NEUTROPHILS # BLD AUTO: 5.1 K/UL
NEUTROPHILS NFR BLD: 62 %
NITRITE UR QL STRIP: NEGATIVE
NRBC BLD-RTO: 0 /100 WBC
PH UR STRIP: 5 [PH] (ref 5–8)
PLATELET # BLD AUTO: 296 K/UL
PMV BLD AUTO: 10.5 FL
POTASSIUM SERPL-SCNC: 3.6 MMOL/L
PROCALCITONIN SERPL IA-MCNC: 0.07 NG/ML
PROT SERPL-MCNC: 7 G/DL
PROT UR QL STRIP: NEGATIVE
RBC # BLD AUTO: 5.05 M/UL
SODIUM SERPL-SCNC: 143 MMOL/L
SP GR UR STRIP: 1.01 (ref 1–1.03)
URATE SERPL-MCNC: 8.2 MG/DL
URN SPEC COLLECT METH UR: NORMAL
WBC # BLD AUTO: 8.18 K/UL

## 2018-11-28 PROCEDURE — 81003 URINALYSIS AUTO W/O SCOPE: CPT

## 2018-11-28 PROCEDURE — 99220 PR INITIAL OBSERVATION CARE,LEVL III: CPT | Mod: ,,, | Performed by: PHYSICIAN ASSISTANT

## 2018-11-28 PROCEDURE — 25000003 PHARM REV CODE 250: Performed by: EMERGENCY MEDICINE

## 2018-11-28 PROCEDURE — G0378 HOSPITAL OBSERVATION PER HR: HCPCS

## 2018-11-28 PROCEDURE — 25000003 PHARM REV CODE 250: Performed by: PHYSICIAN ASSISTANT

## 2018-11-28 PROCEDURE — A4216 STERILE WATER/SALINE, 10 ML: HCPCS | Performed by: PHYSICIAN ASSISTANT

## 2018-11-28 PROCEDURE — 63600175 PHARM REV CODE 636 W HCPCS: Performed by: PHYSICIAN ASSISTANT

## 2018-11-28 PROCEDURE — 86140 C-REACTIVE PROTEIN: CPT

## 2018-11-28 PROCEDURE — 84550 ASSAY OF BLOOD/URIC ACID: CPT

## 2018-11-28 PROCEDURE — 80053 COMPREHEN METABOLIC PANEL: CPT

## 2018-11-28 PROCEDURE — 84145 PROCALCITONIN (PCT): CPT

## 2018-11-28 PROCEDURE — 85025 COMPLETE CBC W/AUTO DIFF WBC: CPT

## 2018-11-28 PROCEDURE — 85652 RBC SED RATE AUTOMATED: CPT

## 2018-11-28 RX ORDER — PROMETHAZINE HYDROCHLORIDE 25 MG/1
25 SUPPOSITORY RECTAL EVERY 6 HOURS PRN
Status: DISCONTINUED | OUTPATIENT
Start: 2018-11-28 | End: 2018-11-29 | Stop reason: HOSPADM

## 2018-11-28 RX ORDER — KETOROLAC TROMETHAMINE 30 MG/ML
10 INJECTION, SOLUTION INTRAMUSCULAR; INTRAVENOUS
Status: COMPLETED | OUTPATIENT
Start: 2018-11-28 | End: 2018-11-28

## 2018-11-28 RX ORDER — DIPHENHYDRAMINE HCL 50 MG
50 CAPSULE ORAL
Status: COMPLETED | OUTPATIENT
Start: 2018-11-28 | End: 2018-11-28

## 2018-11-28 RX ORDER — ACETAMINOPHEN 325 MG/1
650 TABLET ORAL EVERY 4 HOURS PRN
Status: DISCONTINUED | OUTPATIENT
Start: 2018-11-28 | End: 2018-11-29 | Stop reason: HOSPADM

## 2018-11-28 RX ORDER — COLCHICINE 0.6 MG/1
0.6 TABLET, FILM COATED ORAL DAILY
Status: DISCONTINUED | OUTPATIENT
Start: 2018-11-28 | End: 2018-11-29 | Stop reason: HOSPADM

## 2018-11-28 RX ORDER — FUROSEMIDE 40 MG/1
40 TABLET ORAL DAILY
Status: DISCONTINUED | OUTPATIENT
Start: 2018-11-28 | End: 2018-11-29 | Stop reason: HOSPADM

## 2018-11-28 RX ORDER — VANCOMYCIN HCL IN 5 % DEXTROSE 1G/250ML
1000 PLASTIC BAG, INJECTION (ML) INTRAVENOUS
Status: COMPLETED | OUTPATIENT
Start: 2018-11-28 | End: 2018-11-28

## 2018-11-28 RX ORDER — RAMELTEON 8 MG/1
8 TABLET ORAL NIGHTLY PRN
Status: DISCONTINUED | OUTPATIENT
Start: 2018-11-28 | End: 2018-11-29 | Stop reason: HOSPADM

## 2018-11-28 RX ORDER — VANCOMYCIN HCL IN 5 % DEXTROSE 1.5G/250ML
15 PLASTIC BAG, INJECTION (ML) INTRAVENOUS
Status: DISCONTINUED | OUTPATIENT
Start: 2018-11-28 | End: 2018-11-29

## 2018-11-28 RX ORDER — METOPROLOL SUCCINATE 50 MG/1
50 TABLET, EXTENDED RELEASE ORAL DAILY
Status: DISCONTINUED | OUTPATIENT
Start: 2018-11-28 | End: 2018-11-29 | Stop reason: HOSPADM

## 2018-11-28 RX ORDER — CLOPIDOGREL BISULFATE 75 MG/1
75 TABLET ORAL DAILY
Status: DISCONTINUED | OUTPATIENT
Start: 2018-11-28 | End: 2018-11-29 | Stop reason: HOSPADM

## 2018-11-28 RX ORDER — OXYCODONE AND ACETAMINOPHEN 5; 325 MG/1; MG/1
1 TABLET ORAL EVERY 6 HOURS PRN
Status: DISCONTINUED | OUTPATIENT
Start: 2018-11-28 | End: 2018-11-28

## 2018-11-28 RX ORDER — OXYCODONE AND ACETAMINOPHEN 5; 325 MG/1; MG/1
1 TABLET ORAL EVERY 6 HOURS PRN
Status: DISCONTINUED | OUTPATIENT
Start: 2018-11-28 | End: 2018-11-29 | Stop reason: HOSPADM

## 2018-11-28 RX ORDER — ACETAMINOPHEN 10 MG/ML
1000 INJECTION, SOLUTION INTRAVENOUS
Status: COMPLETED | OUTPATIENT
Start: 2018-11-28 | End: 2018-11-28

## 2018-11-28 RX ORDER — HYDROCODONE BITARTRATE AND ACETAMINOPHEN 5; 325 MG/1; MG/1
1 TABLET ORAL EVERY 4 HOURS PRN
Status: DISCONTINUED | OUTPATIENT
Start: 2018-11-28 | End: 2018-11-28

## 2018-11-28 RX ORDER — ENOXAPARIN SODIUM 100 MG/ML
40 INJECTION SUBCUTANEOUS EVERY 24 HOURS
Status: DISCONTINUED | OUTPATIENT
Start: 2018-11-28 | End: 2018-11-29 | Stop reason: HOSPADM

## 2018-11-28 RX ORDER — SODIUM CHLORIDE 0.9 % (FLUSH) 0.9 %
3 SYRINGE (ML) INJECTION
Status: DISCONTINUED | OUTPATIENT
Start: 2018-11-28 | End: 2018-11-29 | Stop reason: HOSPADM

## 2018-11-28 RX ORDER — KETOROLAC TROMETHAMINE 30 MG/ML
15 INJECTION, SOLUTION INTRAMUSCULAR; INTRAVENOUS EVERY 6 HOURS PRN
Status: DISCONTINUED | OUTPATIENT
Start: 2018-11-28 | End: 2018-11-29 | Stop reason: HOSPADM

## 2018-11-28 RX ORDER — AMIODARONE HYDROCHLORIDE 200 MG/1
TABLET ORAL
Qty: 135 TABLET | Refills: 0 | Status: SHIPPED | OUTPATIENT
Start: 2018-11-28 | End: 2018-12-14

## 2018-11-28 RX ORDER — ATORVASTATIN CALCIUM 20 MG/1
80 TABLET, FILM COATED ORAL DAILY
Status: DISCONTINUED | OUTPATIENT
Start: 2018-11-28 | End: 2018-11-29 | Stop reason: HOSPADM

## 2018-11-28 RX ORDER — ONDANSETRON 8 MG/1
8 TABLET, ORALLY DISINTEGRATING ORAL EVERY 8 HOURS PRN
Status: DISCONTINUED | OUTPATIENT
Start: 2018-11-28 | End: 2018-11-29 | Stop reason: HOSPADM

## 2018-11-28 RX ORDER — ASPIRIN 325 MG
325 TABLET, DELAYED RELEASE (ENTERIC COATED) ORAL DAILY
Status: DISCONTINUED | OUTPATIENT
Start: 2018-11-28 | End: 2018-11-29 | Stop reason: HOSPADM

## 2018-11-28 RX ORDER — POLYETHYLENE GLYCOL 3350 17 G/17G
17 POWDER, FOR SOLUTION ORAL DAILY
Status: DISCONTINUED | OUTPATIENT
Start: 2018-11-28 | End: 2018-11-29 | Stop reason: HOSPADM

## 2018-11-28 RX ADMIN — VANCOMYCIN HYDROCHLORIDE 1500 MG: 10 INJECTION, POWDER, LYOPHILIZED, FOR SOLUTION INTRAVENOUS at 06:11

## 2018-11-28 RX ADMIN — OXYCODONE HYDROCHLORIDE AND ACETAMINOPHEN 1 TABLET: 5; 325 TABLET ORAL at 08:11

## 2018-11-28 RX ADMIN — ATORVASTATIN CALCIUM 80 MG: 20 TABLET, FILM COATED ORAL at 11:11

## 2018-11-28 RX ADMIN — METOPROLOL SUCCINATE 50 MG: 50 TABLET, EXTENDED RELEASE ORAL at 11:11

## 2018-11-28 RX ADMIN — POLYETHYLENE GLYCOL 3350 17 G: 17 POWDER, FOR SOLUTION ORAL at 11:11

## 2018-11-28 RX ADMIN — ENOXAPARIN SODIUM 40 MG: 100 INJECTION SUBCUTANEOUS at 05:11

## 2018-11-28 RX ADMIN — VANCOMYCIN HYDROCHLORIDE 1000 MG: 1 INJECTION, POWDER, LYOPHILIZED, FOR SOLUTION INTRAVENOUS at 06:11

## 2018-11-28 RX ADMIN — OXYCODONE HYDROCHLORIDE AND ACETAMINOPHEN 1 TABLET: 5; 325 TABLET ORAL at 09:11

## 2018-11-28 RX ADMIN — Medication 3 ML: at 09:11

## 2018-11-28 RX ADMIN — FUROSEMIDE 40 MG: 40 TABLET ORAL at 11:11

## 2018-11-28 RX ADMIN — AMIODARONE HYDROCHLORIDE 300 MG: 200 TABLET ORAL at 11:11

## 2018-11-28 RX ADMIN — COLCHICINE 0.6 MG: 0.6 TABLET, FILM COATED ORAL at 03:11

## 2018-11-28 RX ADMIN — DIPHENHYDRAMINE HYDROCHLORIDE 50 MG: 50 CAPSULE ORAL at 06:11

## 2018-11-28 RX ADMIN — KETOROLAC TROMETHAMINE 15 MG: 30 INJECTION, SOLUTION INTRAMUSCULAR at 05:11

## 2018-11-28 RX ADMIN — ASPIRIN 325 MG: 325 TABLET, DELAYED RELEASE ORAL at 11:11

## 2018-11-28 RX ADMIN — KETOROLAC TROMETHAMINE 10 MG: 30 INJECTION, SOLUTION INTRAMUSCULAR at 04:11

## 2018-11-28 RX ADMIN — RAMELTEON 8 MG: 8 TABLET, FILM COATED ORAL at 09:11

## 2018-11-28 RX ADMIN — ACETAMINOPHEN 1000 MG: 10 INJECTION, SOLUTION INTRAVENOUS at 02:11

## 2018-11-28 RX ADMIN — CLOPIDOGREL 75 MG: 75 TABLET, FILM COATED ORAL at 11:11

## 2018-11-28 NOTE — ASSESSMENT & PLAN NOTE
Audrey Oneil . is a 68 y.o. male with dorsal wrist swelling and erythema.    -Patient had major improvements in wrist pain and ROM after a dose of toradol.  Was able to fully range wrist without pain.  He did have swelling and erythema of the right hand and wrist.  Aspiration attempted with absolutely no fluid.  Not concerned about septic wrist at this time.  I wrapped and elevated him and recommend treating for cellulitis.

## 2018-11-28 NOTE — HPI
Audrey Oneil Jr. is a 68 y.o. male with a history of CAD, CHF and gout who presented to the ED for wrist pain.  No traumatic event.  On arrival he had pain with wrist ROM with swelling of the hand and dorsal wrist.   He was given toradol and felt very significant relief of pain.  On my evaluation after toradol he had minimal pain and tenderness at the wrist.  He denies fever/chills, insect bite, traumatic injury, IV drug use, numbness/tingling, or weakness.

## 2018-11-28 NOTE — ED TRIAGE NOTES
Audrey Oneil ., a 68 y.o. male presents to the ED w/ complaint of right wrist pain that radiates upward the arm. Pt states the pain started about two days ago. Pt denies any injury or trauma and states he has a hx of DVT and PE. Pt states he takes his anticoagulants as prescribed. No swelling noted, some warmth and redness noted to the site. Cap refill <2 seconds.     Triage note:  Chief Complaint   Patient presents with    Wrist Pain     Presents to ED c/o pain, warmth, redness, and swelling to R wrist with pain radiating up the extremity. Scott radial pulses 2+. Denies injury     Review of patient's allergies indicates:   Allergen Reactions    Iodine containing multivitamin Swelling     itching    Keflex [cephalexin] Swelling     eyes    Peaches [peach (prunus persica)] Swelling     eyes    Shellfish containing products Swelling    Fig tree Swelling     itching    Tuberculin spenser test ppd Rash     Past Medical History:   Diagnosis Date    AICD (automatic cardioverter/defibrillator) present 12/13/2015      Chronic anticoagulation 5/5/2016    Chronic combined systolic and diastolic heart failure 11/26/2012    EF 10-20% on ECHO 2013    Clotting disorder     Coronary artery disease involving native coronary artery of native heart without angina pectoris 11/26/2012    Cath 10- Stents patent non-obstructive disease Cath 11-12015 non-obstructive disease     Diverticulosis of colon     DVT (deep venous thrombosis), unspecified laterality 11/12/2015    Essential hypertension 11/15/2015    Hyperlipidemia     Hypertensive heart disease with heart failure 5/5/2016    MI (myocardial infarction) 2009    x's 5    Nicotine abuse     Obesity 11/26/2012    Pulmonary embolism 2011    Renal disorder     LAVERNE    Stented coronary artery 11/26/2012    LAD stent placed 10/17/2007

## 2018-11-28 NOTE — HPI
Patient is a 68 year old male with a PMHx of CAD, CHF, HLD, PE, gout, R carpal tunnel (s/p release 4/2018), and R trigger finger, who was admitted to observation for cellulitis of right hand and wrist. Patient complains of right wrist and hand pain X 2 days. Pain began at the ulnar aspect of the wrist and is now through the entire wrist and hand with radiation through the forearm to the axilla. Pain was 10/10 on arrival but 7/10 after toradol. Pt reports decreased ROM in right hand and wrist due to pain. It is associated with erythema, warmth, and swelling that began at fingertips and also spread through forearm. Patient reports swelling has decreased some. He states he noticed a cut on his right wrist about 3 weeks ago. He denies fever, chills, insect bite, IV drug use, numbness/ tingling, or weakness. Of note, patient had been seen multiple times in ED in 9/18 for pain from a right wrist sprain after a fall.    In the ED, patient afebrile without leukocytosis. ESR 25, CRP 11.7. Procalcitonin WNL. Xray of R wrist and hand show moderate soft tissue edema involving the hand and wrist, no evidence of fracture. Dose of toradol improved wrist pain and ROM. Ortho evaluated, aspiration attempted with absolutely no fluid. Cultures pending. Hand was wrapped and elevated. Started on IV vanc.

## 2018-11-28 NOTE — ED PROVIDER NOTES
Encounter Date: 11/27/2018       History     Chief Complaint   Patient presents with    Wrist Pain     Presents to ED c/o pain, warmth, redness, and swelling to R wrist with pain radiating up the extremity. [place] radial pulses 2+. Denies injury     Patient is a 68-year-old white male with a PMH of HLD, PE, gout, CAD, CHF who presents to the ED for urgent evaluation wrist pain. Patient reports pain began at the ulnar aspect of the wrist 2 days ago and is now 10/10 through the entire wrist and hand with radiation through the forearm to the axilla.  He has associated swelling and warmth of the wrist and hand.  He reports pain with wrist flexion and extension and making a fist. He denies fever/chills, insect bite, traumatic injury, IV drug use, numbness/tingling, or weakness. He has tried Vicodin for the pain without relief.      The history is provided by the patient.     Review of patient's allergies indicates:   Allergen Reactions    Iodine containing multivitamin Swelling     itching    Keflex [cephalexin] Swelling     eyes    Peaches [peach (prunus persica)] Swelling     eyes    Shellfish containing products Swelling    Fig tree Swelling     itching    Tuberculin spenser test ppd Rash     Past Medical History:   Diagnosis Date    AICD (automatic cardioverter/defibrillator) present 12/13/2015      Chronic anticoagulation 5/5/2016    Chronic combined systolic and diastolic heart failure 11/26/2012    EF 10-20% on ECHO 2013    Clotting disorder     Coronary artery disease involving native coronary artery of native heart without angina pectoris 11/26/2012    Cath 10- Stents patent non-obstructive disease Cath 11-12015 non-obstructive disease     Diverticulosis of colon     DVT (deep venous thrombosis), unspecified laterality 11/12/2015    Essential hypertension 11/15/2015    Hyperlipidemia     Hypertensive heart disease with heart failure 5/5/2016    MI (myocardial infarction) 2009    x's  5    Nicotine abuse     Obesity 2012    Pulmonary embolism     Renal disorder     LAVERNE    Stented coronary artery 2012    LAD stent placed 10/17/2007      Past Surgical History:   Procedure Laterality Date    APPENDECTOMY      BACK SURGERY      CARDIAC DEFIBRILLATOR PLACEMENT      CARDIAC SURGERY      stent    CARPAL TUNNEL RELEASE Right 2018    Embolectomy Right 2017    [facility] 2ND FLR    HEART CATH-LEFT Left 2015    [facility] CATH LAB    HEART CATH-RIGHT Right 7/10/2017    [facility] by Edison [facility] at [facility] CATH [facility]    IRRIGATION AND DEBRIDEMENT UPPER EXTREMITY - LEFT INDEX FINGER Left 2016    [facility] by Cornell [facility] at [facility] 2ND FLR    r knee scope      RELEASE-CARPAL TUNNEL right, right thumb TFR Right 2018    [facility]    RELEASE-FINGER-TRIGGER right thumb Right 2018    [facility]    SPINE SURGERY      TONSILLECTOMY      TRIGGER FINGER RELEASE Right 2018    thumb     Family History   Problem Relation Age of Onset    Cancer Mother         colon cancer    Heart disease Mother     Diabetes Mother     Heart disease Father     Stroke Father     Colon polyps Father     Cancer Paternal Grandmother         leukemia    No Known Problems Sister     No Known Problems Daughter     No Known Problems Son     No Known Problems Sister     No Known Problems Son      Social History     Tobacco Use    Smoking status: Former Smoker     Packs/day: 1.00     Years: 45.00     Pack years: 45.00     Types: Cigarettes     Last attempt to quit: 2005     Years since quittin.9    Smokeless tobacco: Never Used    Tobacco comment: 1-1.5 ppd every day.   Substance Use Topics    Alcohol use: No     Frequency: Never    Drug use: No     Review of Systems   Constitutional: Negative for  activity change, appetite change, chills and fever.   HENT: Negative for sore throat.    Eyes: Negative for visual disturbance.   Respiratory: Negative for shortness of breath.    Cardiovascular: Negative for chest pain.   Gastrointestinal: Negative for abdominal pain.   Genitourinary: Negative for dysuria.   Musculoskeletal: Positive for arthralgias, joint swelling and myalgias.   Skin: Positive for color change. Negative for wound.   Neurological: Negative for weakness and numbness.   Psychiatric/Behavioral: Negative for dysphoric mood.       Physical Exam     Initial Vitals [11/27/18 2321]   BP Pulse Resp Temp SpO2   (!) 121/59 76 18 98.2 °F (36.8 °C) 96 %      MAP       --         Physical Exam    Nursing note and vitals reviewed.  Constitutional: He appears well-developed and well-nourished. He is not diaphoretic. No distress.   Obese, chronically-ill appearing male.   HENT:   Head: Normocephalic and atraumatic.   Mouth/Throat: Oropharynx is clear and moist. No oropharyngeal exudate.   Eyes: Conjunctivae are normal. Pupils are equal, round, and reactive to light.   Neck: Normal range of motion. Neck supple.   Cardiovascular: Normal rate, regular rhythm, normal heart sounds and intact distal pulses.   Pulmonary/Chest: Breath sounds normal. No respiratory distress.   Abdominal: Soft. Bowel sounds are normal.   Musculoskeletal:   Erythema, edema, and warmth noted to the right wrist.  Limited ROM 2/2 pain.  TTP to palmar and dorsal aspects of wrist and thenar eminence.  No tenderness noted to elbow, upper arm, or shoulder.  No open wound noted.   Neurological: He is alert and oriented to person, place, and time.   Skin: Skin is warm and dry.   Psychiatric: He has a normal mood and affect. Thought content normal.         ED Course   Procedures  Labs Reviewed   CBC W/ AUTO DIFFERENTIAL   COMPREHENSIVE METABOLIC PANEL   SEDIMENTATION RATE   C-REACTIVE PROTEIN          Imaging Results    None          Medical Decision  Making:   History:   Old Medical Records: I decided to obtain old medical records.  Initial Assessment:   68-year-old male with history of gout and CHF presents to the ED for urgent evaluation of right wrist pain that began 2 days ago.  PE reveals a nontoxic, afebrile, chronically ill-appearing obese male in mild painful distress. Vital signs are stable. Patient exhibits significant pain with any movement of the wrist and fingers.  There is edema, erythema, warmth noted to the wrist, hand and digits.  Patient maintains full ROM of elbow and shoulder.  There are no open skin wounds.  Patient denies trauma. Compartments are soft with intact distal pulses.  Differential Diagnosis:   DDX includes but is not limited to:  Gout, flexor tenosynovitis, cellulitis, septic joint.  Clinical Tests:   Lab Tests: Ordered and Reviewed  Radiological Study: Ordered and Reviewed  ED Management:  Will obtain basic labs, inflammatory markers and x-ray of the hand and wrist.  Will give Tylenol for pain and reassess.  Consult Orthopedics, who will see the patient.     4:30AM  Patient reports pain unrelieved with Tylenol.  Will give Toradol 10 mg IV.   Waiting on recommendations from Orthopedics.    Per orthopedics, patient was able to move his wrist freely without pain after Toradol. There was no evidence of septic joint with aspiration; lab results are pending. They suspect swelling is subcutaneous and recommend antibiotics for treatment of cellulitis. Will give 1g Vanc in ED and admit patient for IV antibiotic therapy given both wrist and hand involvement. Patient informed of plan for admission and is agreeable. I have discussed patient case with my supervisory physician, who is in agreement with my assessment and plan.                        Clinical Impression:   Diagnoses of Wrist pain and Cellulitis were pertinent to this visit.        Disposition:   Disposition: Placed in Observation  Condition: Stable                         Anna Vargas PA-C  11/28/18 0824

## 2018-11-28 NOTE — SUBJECTIVE & OBJECTIVE
Past Medical History:   Diagnosis Date    AICD (automatic cardioverter/defibrillator) present 12/13/2015      Chronic anticoagulation 5/5/2016    Chronic combined systolic and diastolic heart failure 11/26/2012    EF 10-20% on ECHO 2013    Clotting disorder     Coronary artery disease involving native coronary artery of native heart without angina pectoris 11/26/2012    Cath 10- Stents patent non-obstructive disease Cath 11-12015 non-obstructive disease     Diverticulosis of colon     DVT (deep venous thrombosis), unspecified laterality 11/12/2015    Essential hypertension 11/15/2015    Hyperlipidemia     Hypertensive heart disease with heart failure 5/5/2016    MI (myocardial infarction) 2009    x's 5    Nicotine abuse     Obesity 11/26/2012    Pulmonary embolism 2011    Renal disorder     LAVERNE    Stented coronary artery 11/26/2012    LAD stent placed 10/17/2007        Past Surgical History:   Procedure Laterality Date    APPENDECTOMY      BACK SURGERY      CARDIAC DEFIBRILLATOR PLACEMENT      CARDIAC SURGERY  2007    stent    CARPAL TUNNEL RELEASE Right 04/2018    Embolectomy Right 11/26/2017    [facility] at [facility] 2ND FLR    HEART CATH-LEFT Left 11/13/2015    [facility] at [facility] CATH LAB    HEART CATH-RIGHT Right 7/10/2017    Performed by Edison [facility] at [facility] CATH LAB    IRRIGATION AND DEBRIDEMENT UPPER EXTREMITY - LEFT INDEX FINGER Left 12/8/2016    Performed by Cornell [facility] at [facility] OR 2ND FLR    r knee scope      RELEASE-CARPAL TUNNEL right, right thumb TFR Right 4/6/2018    [facility]    RELEASE-FINGER-TRIGGER right thumb Right 4/6/2018    [facility] OR    SPINE SURGERY      TONSILLECTOMY      TRIGGER FINGER RELEASE Right 04/2018    thumb       Review of patient's allergies indicates:   Allergen Reactions    Iodine containing  multivitamin Swelling     itching    Keflex [cephalexin] Swelling     eyes    Peaches [peach (prunus persica)] Swelling     eyes    Shellfish containing products Swelling    Fig tree Swelling     itching    Tuberculin spenser test ppd Rash       No current facility-administered medications on file prior to encounter.      Current Outpatient Medications on File Prior to Encounter   Medication Sig    allopurinol (ZYLOPRIM) 100 MG tablet TAKE 1 TABLET(100 MG) BY MOUTH EVERY DAY    amiodarone (PACERONE) 200 MG Tab Take 1 tablet (200 mg total) by mouth once daily.    amiodarone (PACERONE) 200 MG Tab TAKE 1 AND 1/2 TABLETS(300 MG) BY MOUTH EVERY DAY    aspirin (ECOTRIN) 325 MG EC tablet Take 325 mg by mouth once daily.    atorvastatin (LIPITOR) 80 MG tablet TAKE 1 TABLET(80 MG) BY MOUTH EVERY DAY    clopidogrel (PLAVIX) 75 mg tablet Take 1 tablet (75 mg total) by mouth once daily.    colchicine (COLCRYS) 0.6 mg tablet TAKE 2 TABLETS BY MOUTH ON DAY 1, THEN TAKE 1 TABLET BY MOUTH EVERY DAY AS NEEDED THEREAFTER    diphenhydrAMINE (BENADRYL) 50 MG tablet Take 1 tab at 6 pm & 11 pm on night before your proc, then 1 tab at 6 am on day of proc    docusate sodium (COLACE) 50 MG capsule Take 1 capsule (50 mg total) by mouth 2 (two) times daily. (Patient taking differently: Take 50 mg by mouth 2 (two) times daily as needed. )    furosemide (LASIX) 40 MG tablet TAKE 1 TABLET(40 MG) BY MOUTH EVERY DAY    HYDROcodone-acetaminophen (NORCO)  mg per tablet Take 1 tablet by mouth every 6 (six) hours as needed for Pain.    lidocaine (LIDODERM) 5 % Place 1 patch onto the skin once daily. Remove & Discard patch within 12 hours or as directed by MD    metoprolol succinate (TOPROL-XL) 50 MG 24 hr tablet TAKE 1 TABLET BY MOUTH EVERY DAY    oxyCODONE-acetaminophen (PERCOCET)  mg per tablet Take 1 tablet by mouth every 4 (four) hours as needed for Pain.    gabapentin (NEURONTIN) 100 MG capsule Take 1 capsule (100 mg  total) by mouth 2 (two) times daily. for 5 days     Family History     Problem Relation (Age of Onset)    Cancer Mother, Paternal Grandmother    Colon polyps Father    Diabetes Mother    Heart disease Mother, Father    No Known Problems Sister, Daughter, Son, Sister, Son    Stroke Father        Tobacco Use    Smoking status: Former Smoker     Packs/day: 1.00     Years: 45.00     Pack years: 45.00     Types: Cigarettes     Last attempt to quit: 2005     Years since quittin.9    Smokeless tobacco: Never Used    Tobacco comment: 1-1.5 ppd every day.   Substance and Sexual Activity    Alcohol use: No     Frequency: Never    Drug use: No    Sexual activity: No     Review of Systems   Constitutional: Negative for activity change, chills, diaphoresis, fatigue and fever.   HENT: Negative for congestion, sore throat and trouble swallowing.    Respiratory: Negative for cough, choking, chest tightness and shortness of breath.    Cardiovascular: Negative for chest pain, palpitations and leg swelling.   Gastrointestinal: Negative for abdominal distention, abdominal pain, constipation, diarrhea, nausea and vomiting.   Genitourinary: Negative for difficulty urinating, dysuria, flank pain, frequency and urgency.   Musculoskeletal: Positive for back pain (chronic), joint swelling (right wrist) and neck pain (chronic). Negative for myalgias.   Skin: Positive for color change (erythema from fingers up to below right elbow).   Neurological: Negative for dizziness, syncope, weakness, numbness and headaches.   Psychiatric/Behavioral: Negative for agitation, behavioral problems, confusion and decreased concentration.     Objective:     Vital Signs (Most Recent):  Temp: 98 °F (36.7 °C) (18 0652)  Pulse: 62 (18 0750)  Resp: 14 (18 0750)  BP: 126/79 (18 0652)  SpO2: 96 % (18 0750) Vital Signs (24h Range):  Temp:  [97.7 °F (36.5 °C)-98.2 °F (36.8 °C)] 98 °F (36.7 °C)  Pulse:  [62-76] 62  Resp:   [14-18] 14  SpO2:  [96 %-100 %] 96 %  BP: (121-130)/(58-79) 126/79     Weight: 104.8 kg (231 lb 0.7 oz)  Body mass index is 36.19 kg/m².    Physical Exam   Constitutional: He is oriented to person, place, and time. He appears well-developed and well-nourished. No distress.   HENT:   Head: Normocephalic and atraumatic.   Eyes: EOM are normal. Pupils are equal, round, and reactive to light.   Neck: Normal range of motion. Neck supple.   Cardiovascular: Normal rate, regular rhythm, normal heart sounds and intact distal pulses. Exam reveals no gallop.   No murmur heard.  Pulmonary/Chest: Effort normal and breath sounds normal. No respiratory distress. He has no wheezes. He exhibits no tenderness.   Abdominal: Soft. Bowel sounds are normal. He exhibits no distension and no mass. There is no tenderness.   Musculoskeletal: He exhibits tenderness (over right hand and wrist).   Unable to perform evaluation of right hand and wrist because wrapped from ortho.   Neurological: He is alert and oriented to person, place, and time. No cranial nerve deficit. He exhibits normal muscle tone.   Skin: Skin is warm and dry. He is not diaphoretic. There is erythema (swelling to right hand).   Psychiatric: He has a normal mood and affect. His behavior is normal. Judgment and thought content normal.   Nursing note and vitals reviewed.        CRANIAL NERVES     CN III, IV, VI   Pupils are equal, round, and reactive to light.  Extraocular motions are normal.        Significant Labs:   Blood Culture: No results for input(s): LABBLOO in the last 48 hours.  CBC:   Recent Labs   Lab 11/28/18 0229   WBC 8.18   HGB 14.1   HCT 44.5        CMP:   Recent Labs   Lab 11/28/18 0229      K 3.6      CO2 28      BUN 22   CREATININE 1.1   CALCIUM 9.0   PROT 7.0   ALBUMIN 3.6   BILITOT 0.4   ALKPHOS 116   AST 23   ALT 32   ANIONGAP 11   EGFRNONAA >60.0       Significant Imaging: I have reviewed all pertinent imaging  results/findings within the past 24 hours.

## 2018-11-28 NOTE — TELEPHONE ENCOUNTER
Contacted Karis with [facility] [phone], regarding  direction on his amiodarone (PACERONE) 200 MG Tab.    This RN noted that on 9-19-18 Dr.Selim Marshall ordered Pacerone.    I notified Karis with [facility] that she will need to call  for direction on pacerone.      Jessie TALAMANTES CCM

## 2018-11-28 NOTE — H&P
[facility] Medicine  History & Physical    Patient Name: Audrey Oneil Jr.  MRN: [id number]  Admission Date: 11/28/2018  Attending Physician: KRISTOPHER Nicholson MD   Primary Care Provider: January Khan MD    [facility] Medicine Team: [facility] MED E Hannah Gan PA-C     Patient information was obtained from patient, past medical records and ER records.     Subjective:     Principal Problem:Cellulitis    Chief Complaint:   Chief Complaint   Patient presents with    Wrist Pain     Presents to ED c/o pain, warmth, redness, and swelling to R wrist with pain radiating up the extremity. Scott radial pulses 2+. Denies injury        HPI: Patient is a 68 year old male with a PMHx of CAD, CHF, HLD, PE, gout, R carpal tunnel (s/p release 4/2018), and R trigger finger, who was admitted to observation for cellulitis of right hand and wrist. Patient complains of right wrist and hand pain X 2 days. Pain began at the ulnar aspect of the wrist and is now through the entire wrist and hand with radiation through the forearm to the axilla. Pain was 10/10 on arrival but 7/10 after toradol. Pt reports decreased ROM in right hand and wrist due to pain. It is associated with erythema, warmth, and swelling that began at fingertips and also spread through forearm. Patient reports swelling has decreased some. He states he noticed a cut on his right wrist about 3 weeks ago. He denies fever, chills, insect bite, IV drug use, numbness/ tingling, or weakness. Of note, patient had been seen multiple times in ED in 9/18 for pain from a right wrist sprain after a fall.    In the ED, patient afebrile without leukocytosis. ESR 25, CRP 11.7. Procalcitonin WNL. Xray of R wrist and hand show moderate soft tissue edema involving the hand and wrist, no evidence of fracture. Dose of toradol improved wrist pain and ROM. Ortho evaluated, aspiration attempted with absolutely no fluid. Cultures pending. Hand was wrapped and  elevated. Started on IV vanc.    Past Medical History:   Diagnosis Date    AICD (automatic cardioverter/defibrillator) present 12/13/2015      Chronic anticoagulation 5/5/2016    Chronic combined systolic and diastolic heart failure 11/26/2012    EF 10-20% on ECHO 2013    Clotting disorder     Coronary artery disease involving native coronary artery of native heart without angina pectoris 11/26/2012    Cath 10- Stents patent non-obstructive disease Cath 11-12015 non-obstructive disease     Diverticulosis of colon     DVT (deep venous thrombosis), unspecified laterality 11/12/2015    Essential hypertension 11/15/2015    Hyperlipidemia     Hypertensive heart disease with heart failure 5/5/2016    MI (myocardial infarction) 2009    x's 5    Nicotine abuse     Obesity 11/26/2012    Pulmonary embolism 2011    Renal disorder     LAVERNE    Stented coronary artery 11/26/2012    LAD stent placed 10/17/2007        Past Surgical History:   Procedure Laterality Date    APPENDECTOMY      BACK SURGERY      CARDIAC DEFIBRILLATOR PLACEMENT      CARDIAC SURGERY  2007    stent    CARPAL TUNNEL RELEASE Right 04/2018    Embolectomy Right 11/26/2017    [facility] 2ND FLR    HEART CATH-LEFT Left 11/13/2015    [facility]formed by Britton [facility]repo [facility] at [facility] CATH LAB    HEART CATH-RIGHT Right 7/10/2017    Performed by Edison Marshall MD at [facility] CATH LAB    IRRIGATION AND DEBRIDEMENT UPPER EXTREMITY - LEFT INDEX FINGER Left 12/8/2016    Performed by Cornell Miguel MD at [facility] OR 2ND FLR    r knee scope      RELEASE-CARPAL TUNNEL right, right thumb TFR Right 4/6/2018    Performed by [facility] at [facility] [place]    RELEASE-FINGER-TRIGGER right thumb Right 4/6/2018    Performed by [facility] at [facility] [place]    SPINE SURGERY      TONSILLECTOMY      TRIGGER FINGER RELEASE Right 04/2018    thumb       Review of patient's allergies indicates:   Allergen  Reactions    Iodine containing multivitamin Swelling     itching    Keflex [cephalexin] Swelling     eyes    Peaches [peach (prunus persica)] Swelling     eyes    Shellfish containing products Swelling    Fig tree Swelling     itching    Tuberculin spenser test ppd Rash       No current facility-administered medications on file prior to encounter.      Current Outpatient Medications on File Prior to Encounter   Medication Sig    allopurinol (ZYLOPRIM) 100 MG tablet TAKE 1 TABLET(100 MG) BY MOUTH EVERY DAY    amiodarone (PACERONE) 200 MG Tab Take 1 tablet (200 mg total) by mouth once daily.    amiodarone (PACERONE) 200 MG Tab TAKE 1 AND 1/2 TABLETS(300 MG) BY MOUTH EVERY DAY    aspirin (ECOTRIN) 325 MG EC tablet Take 325 mg by mouth once daily.    atorvastatin (LIPITOR) 80 MG tablet TAKE 1 TABLET(80 MG) BY MOUTH EVERY DAY    clopidogrel (PLAVIX) 75 mg tablet Take 1 tablet (75 mg total) by mouth once daily.    colchicine (COLCRYS) 0.6 mg tablet TAKE 2 TABLETS BY MOUTH ON DAY 1, THEN TAKE 1 TABLET BY MOUTH EVERY DAY AS NEEDED THEREAFTER    diphenhydrAMINE (BENADRYL) 50 MG tablet Take 1 tab at 6 pm & 11 pm on night before your proc, then 1 tab at 6 am on day of proc    docusate sodium (COLACE) 50 MG capsule Take 1 capsule (50 mg total) by mouth 2 (two) times daily. (Patient taking differently: Take 50 mg by mouth 2 (two) times daily as needed. )    furosemide (LASIX) 40 MG tablet TAKE 1 TABLET(40 MG) BY MOUTH EVERY DAY    HYDROcodone-acetaminophen (NORCO)  mg per tablet Take 1 tablet by mouth every 6 (six) hours as needed for Pain.    lidocaine (LIDODERM) 5 % Place 1 patch onto the skin once daily. Remove & Discard patch within 12 hours or as directed by MD    metoprolol succinate (TOPROL-XL) 50 MG 24 hr tablet TAKE 1 TABLET BY MOUTH EVERY DAY    oxyCODONE-acetaminophen (PERCOCET)  mg per tablet Take 1 tablet by mouth every 4 (four) hours as needed for Pain.    gabapentin (NEURONTIN) 100 MG  capsule Take 1 capsule (100 mg total) by mouth 2 (two) times daily. for 5 days     Family History     Problem Relation (Age of Onset)    Cancer Mother, Paternal Grandmother    Colon polyps Father    Diabetes Mother    Heart disease Mother, Father    No Known Problems Sister, Daughter, Son, Sister, Son    Stroke Father        Tobacco Use    Smoking status: Former Smoker     Packs/day: 1.00     Years: 45.00     Pack years: 45.00     Types: Cigarettes     Last attempt to quit: 2005     Years since quittin.9    Smokeless tobacco: Never Used    Tobacco comment: 1-1.5 ppd every day.   Substance and Sexual Activity    Alcohol use: No     Frequency: Never    Drug use: No    Sexual activity: No     Review of Systems   Constitutional: Negative for activity change, chills, diaphoresis, fatigue and fever.   HENT: Negative for congestion, sore throat and trouble swallowing.    Respiratory: Negative for cough, choking, chest tightness and shortness of breath.    Cardiovascular: Negative for chest pain, palpitations and leg swelling.   Gastrointestinal: Negative for abdominal distention, abdominal pain, constipation, diarrhea, nausea and vomiting.   Genitourinary: Negative for difficulty urinating, dysuria, flank pain, frequency and urgency.   Musculoskeletal: Positive for back pain (chronic), joint swelling (right wrist) and neck pain (chronic). Negative for myalgias.   Skin: Positive for color change (erythema from fingers up to below right elbow).   Neurological: Negative for dizziness, syncope, weakness, numbness and headaches.   Psychiatric/Behavioral: Negative for agitation, behavioral problems, confusion and decreased concentration.     Objective:     Vital Signs (Most Recent):  Temp: 98 °F (36.7 °C) (18 06)  Pulse: 62 (18 0750)  Resp: 14 (18 0750)  BP: 126/79 (18 0652)  SpO2: 96 % (18 075) Vital Signs (24h Range):  Temp:  [97.7 °F (36.5 °C)-98.2 °F (36.8 °C)] 98 °F (36.7  °C)  Pulse:  [62-76] 62  Resp:  [14-18] 14  SpO2:  [96 %-100 %] 96 %  BP: (121-130)/(58-79) 126/79     Weight: 104.8 kg (231 lb 0.7 oz)  Body mass index is 36.19 kg/m².    Physical Exam   Constitutional: He is oriented to person, place, and time. He appears well-developed and well-nourished. No distress.   HENT:   Head: Normocephalic and atraumatic.   Eyes: EOM are normal. Pupils are equal, round, and reactive to light.   Neck: Normal range of motion. Neck supple.   Cardiovascular: Normal rate, regular rhythm, normal heart sounds and intact distal pulses. Exam reveals no gallop.   No murmur heard.  Pulmonary/Chest: Effort normal and breath sounds normal. No respiratory distress. He has no wheezes. He exhibits no tenderness.   Abdominal: Soft. Bowel sounds are normal. He exhibits no distension and no mass. There is no tenderness.   Musculoskeletal: He exhibits tenderness (over right hand and wrist).   Unable to perform evaluation of right hand and wrist because wrapped from ortho.   Neurological: He is alert and oriented to person, place, and time. No cranial nerve deficit. He exhibits normal muscle tone.   Skin: Skin is warm and dry. He is not diaphoretic. There is erythema (swelling to right hand).   Psychiatric: He has a normal mood and affect. His behavior is normal. Judgment and thought content normal.   Nursing note and vitals reviewed.        CRANIAL NERVES     CN III, IV, VI   Pupils are equal, round, and reactive to light.  Extraocular motions are normal.        Significant Labs:   Blood Culture: No results for input(s): LABBLOO in the last 48 hours.  CBC:   Recent Labs   Lab 11/28/18 0229   WBC 8.18   HGB 14.1   HCT 44.5        CMP:   Recent Labs   Lab 11/28/18 0229      K 3.6      CO2 28      BUN 22   CREATININE 1.1   CALCIUM 9.0   PROT 7.0   ALBUMIN 3.6   BILITOT 0.4   ALKPHOS 116   AST 23   ALT 32   ANIONGAP 11   EGFRNONAA >60.0       Significant Imaging: I have reviewed all  pertinent imaging results/findings within the past 24 hours.    Assessment/Plan:     * Cellulitis    Patient with right wrist swelling and erythema X 3 days. History of right wrist sprain with residual pain, gout, and carpal tunnel. Reports cut on wrist about 1 month ago. Denies insect bites, recent trauma, IV drug use. Afebrile without leukocytosis.  - CXR with moderate soft tissue edema of wrist and hand. No evidence of fracture  - ESR 25, CRP 11.7  - procal WNL  - cultures pending  - IV Vanc started  - Ortho consulted, aspiration attempted with no fluid  - Arm wrapped and elevated  - daily labs     Chronic combined systolic and diastolic heart failure    Cardiomyopathy, ischemic  ICD (implantable cardioverter-defibrillator) in place    Patient follows with Dr. Gongora in [facility]. Enrolled in [employer]  - Echo 4/2018: LVEF upper teens- low 20s, Mild left atrial enlargement, grade 1 diastolic dysfunction, trivial mitral regurgitation and tricuspid regurgitation.   - continue home Lasix, metoprolol  - fluid restriction     Idiopathic chronic gout of left foot without tophus    - Uric acid 8.2  - Continue allopurinol  - colchicine for now     CKD (chronic kidney disease), stage III    Current GFR >60. Cr 1.1  - monitor     Essential hypertension    Controlled  - continue home metoprolol     Coronary artery disease involving native coronary artery of native heart without angina pectoris    - continue ASA, lipitor, Plavix       VTE Risk Mitigation (From admission, onward)        Ordered     enoxaparin injection 40 mg  Daily      11/28/18 0759     IP VTE HIGH RISK PATIENT  Once      11/28/18 0759     Place GODFREY hose  Until discontinued      11/28/18 0759     Place sequential compression device  Until discontinued      11/28/18 0759             Hannah Gan PA-C  Department of Hospital Medicine   [facility]

## 2018-11-28 NOTE — ASSESSMENT & PLAN NOTE
Cardiomyopathy, ischemic  ICD (implantable cardioverter-defibrillator) in place    Patient follows with Dr. Gongora in \[facility]". Enrolled in [employer]  - Echo 4/2018: LVEF upper teens- low 20s, Mild left atrial enlargement, grade 1 diastolic dysfunction, trivial mitral regurgitation and tricuspid regurgitation.   - continue home Lasix, metoprolol  - fluid restriction

## 2018-11-28 NOTE — ASSESSMENT & PLAN NOTE
Patient with right wrist swelling and erythema X 3 days. History of right wrist sprain with residual pain, gout, and carpal tunnel. Reports cut on wrist about 1 month ago. Denies insect bites, recent trauma, IV drug use. Afebrile without leukocytosis.  - CXR with moderate soft tissue edema of wrist and hand. No evidence of fracture  - ESR 25, CRP 11.7  - procal WNL  - cultures pending  - IV Vanc started  - Ortho consulted, aspiration attempted with no fluid  - Arm wrapped and elevated  - daily labs

## 2018-11-28 NOTE — ED NOTES
Adult Physical Assessment:    Appearance: Patient resting comfortably on stretcher, and is in no acute distress at this time. Patient is clean and well groomed, with clothing properly fastened. Patient has no facial grimacing, and no indications of being in pain currently.    Cardiac: Heart sounds audible and without abnormalities noted. Patient attached to continuous cardiac monitor, automatic/cycling BP cuff, and continuous pulse ox. Patient has a normal rate and regular rhythm. No peripheral edema noted.    Neuro/LOC: Eyes open spontaneously, behavior appropriate to situation, follows commands, facial expression symmetrical, purposeful motor response noted. AAOx4; The patient is awake, alert and aware of environment with an appropriate affect, and speaking appropriately. Patient denies dizziness and HA    Respiratory: Breath sounds audible, equal and clear bilaterally. Airway is open and patent, respirations are spontaneous, even, and unlabored. Normal effort and rate noted, and without accessory muscle use.    Skin: Intact, warm and dry. Color is consistent with ethnicity. Normal skin turgor and moist mucous membranes.    Gastro: Bowel sounds audible and active x4 quadrants. Abdomen is soft, non-tender, and non-distended.    Musculoskeletal: Limited ROM RUE. Increased pain/swelling. Currently wrapped RUE. No obvious deformity      Peripheral vascular: Pulses +2 x4 upper/lower extremities.     -Patient identifiers verified and correct for this patient.  -Patient resting with bedrails up x2 for safety, bed locked in low position, and call bell within reach.

## 2018-11-28 NOTE — ED NOTES
Paged MICHAEL OVERTON re: pain med.    [phone] - PA Elvia returned page, reported he would put in orders.

## 2018-11-28 NOTE — TELEPHONE ENCOUNTER
----- Message from Karis Matthew MA sent at 11/27/2018  4:23 PM CST -----  Contact: Ceasar Alonzo [phone]      ----- Message -----  From: Vandana Vidal  Sent: 11/27/2018   3:22 PM  To: Aris Meehan Staff    Karis is calling to get direction on his amiodarone (PACERONE) 200 MG Tab. Please call her back @ [phone]. Thanks, Vandana

## 2018-11-28 NOTE — NURSING
Received report from Yoselin TALAMANTES in ER. Pt arrived to OBS unit via stretcher. Pt accompanied by transport associate. Pt aaox4. Pt denies pain. Dressing and splint intact to RUE. Pt oriented to room. Bed at lowest level, wheels locked. Call light within pt reach.

## 2018-11-28 NOTE — PLAN OF CARE
11/28/18 1220   Discharge Assessment   Assessment Type Discharge Planning Assessment   Confirmed/corrected address and phone number on facesheet? Yes   Assessment information obtained from? Patient   Expected Length of Stay (days) 1   Communicated expected length of stay with patient/caregiver yes   Prior to hospitilization cognitive status: Alert/Oriented   Prior to hospitalization functional status: Independent   Current cognitive status: Alert/Oriented   Current Functional Status: Independent   Lives With parent(s);sibling(s);other relative(s)  (mother, Yue Oneil ([phone]), sister, Riana Yu ([phone]), & b-in-l, Nathanael Yu ([phone]))   Able to Return to Prior Arrangements yes   Is patient able to care for self after discharge? Yes   Patient's perception of discharge disposition home or selfcare   Readmission Within The Last 30 Days no previous admission in last 30 days   Patient currently being followed by outpatient case management? No   Patient currently receives any other outside agency services? No   Equipment Currently Used at Home none   Do you have any problems affording any of your prescribed medications? No   Is the patient taking medications as prescribed? yes   Does the patient have transportation home? Yes   Transportation Available family or friend will provide   Does the patient receive services at the [facility]? No   Discharge Plan A Home with family   Discharge Plan B Home Health   Patient/Family In Agreement With Plan yes     Dx: cellulitis  Consult: ortho surg  Pharm: [facility]  Hosp f/u appt: CANDIDO Estrella on 12/11/18 at 1430

## 2018-11-28 NOTE — SUBJECTIVE & OBJECTIVE
Past Medical History:   Diagnosis Date    AICD (automatic cardioverter/defibrillator) present 12/13/2015      Chronic anticoagulation 5/5/2016    Chronic combined systolic and diastolic heart failure 11/26/2012    EF 10-20% on ECHO 2013    Clotting disorder     Coronary artery disease involving native coronary artery of native heart without angina pectoris 11/26/2012    Cath 10- Stents patent non-obstructive disease Cath 11-12015 non-obstructive disease     Diverticulosis of colon     DVT (deep venous thrombosis), unspecified laterality 11/12/2015    Essential hypertension 11/15/2015    Hyperlipidemia     Hypertensive heart disease with heart failure 5/5/2016    MI (myocardial infarction) 2009    x's 5    Nicotine abuse     Obesity 11/26/2012    Pulmonary embolism 2011    Renal disorder     LAVERNE    Stented coronary artery 11/26/2012    LAD stent placed 10/17/2007        Past Surgical History:   Procedure Laterality Date    APPENDECTOMY      BACK SURGERY      CARDIAC DEFIBRILLATOR PLACEMENT      CARDIAC SURGERY  2007    stent    CARPAL TUNNEL RELEASE Right 04/2018    Embolectomy Right 11/26/2017    [facility] 2ND FLR    HEART CATH-LEFT Left 11/13/2015    Performed by Birtton Restrepo MD at [facility] CATH LAB    HEART CATH-RIGHT Right 7/10/2017    Performed by Edison Marshall [facility] at [facility] CATH LAB    IRRIGATION AND DEBRIDEMENT UPPER EXTREMITY - LEFT INDEX FINGER Left 12/8/2016    Performed by Cornell Miguel [facility] at [facility] 2ND FLR    r knee scope      RELEASE-CARPAL TUNNEL right, right thumb TFR Right 4/6/2018    [facility]    RELEASE-FINGER-TRIGGER right thumb Right 4/6/2018    [facility] at [facility]    SPINE SURGERY      TONSILLECTOMY      TRIGGER FINGER RELEASE Right 04/2018    thumb       Review of patient's allergies indicates:   Allergen Reactions    Iodine containing  multivitamin Swelling     itching    Keflex [cephalexin] Swelling     eyes    Peaches [peach (prunus persica)] Swelling     eyes    Shellfish containing products Swelling    Fig tree Swelling     itching    Tuberculin spenser test ppd Rash       Current Facility-Administered Medications   Medication    vancomycin in dextrose 5 % 1 gram/250 mL IVPB 1,000 mg     Current Outpatient Medications   Medication Sig    allopurinol (ZYLOPRIM) 100 MG tablet TAKE 1 TABLET(100 MG) BY MOUTH EVERY DAY    amiodarone (PACERONE) 200 MG Tab Take 1 tablet (200 mg total) by mouth once daily.    amiodarone (PACERONE) 200 MG Tab TAKE 1 AND 1/2 TABLETS(300 MG) BY MOUTH EVERY DAY    aspirin (ECOTRIN) 325 MG EC tablet Take 325 mg by mouth once daily.    atorvastatin (LIPITOR) 80 MG tablet TAKE 1 TABLET(80 MG) BY MOUTH EVERY DAY    clopidogrel (PLAVIX) 75 mg tablet Take 1 tablet (75 mg total) by mouth once daily.    colchicine (COLCRYS) 0.6 mg tablet TAKE 2 TABLETS BY MOUTH ON DAY 1, THEN TAKE 1 TABLET BY MOUTH EVERY DAY AS NEEDED THEREAFTER    diphenhydrAMINE (BENADRYL) 50 MG tablet Take 1 tab at 6 pm & 11 pm on night before your proc, then 1 tab at 6 am on day of proc    docusate sodium (COLACE) 50 MG capsule Take 1 capsule (50 mg total) by mouth 2 (two) times daily. (Patient taking differently: Take 50 mg by mouth 2 (two) times daily as needed. )    furosemide (LASIX) 40 MG tablet TAKE 1 TABLET(40 MG) BY MOUTH EVERY DAY    HYDROcodone-acetaminophen (NORCO)  mg per tablet Take 1 tablet by mouth every 6 (six) hours as needed for Pain.    lidocaine (LIDODERM) 5 % Place 1 patch onto the skin once daily. Remove & Discard patch within 12 hours or as directed by MD    metoprolol succinate (TOPROL-XL) 50 MG 24 hr tablet TAKE 1 TABLET BY MOUTH EVERY DAY    oxyCODONE-acetaminophen (PERCOCET)  mg per tablet Take 1 tablet by mouth every 4 (four) hours as needed for Pain.    gabapentin (NEURONTIN) 100 MG capsule Take 1  "capsule (100 mg total) by mouth 2 (two) times daily. for 5 days     Family History     Problem Relation (Age of Onset)    Cancer Mother, Paternal Grandmother    Colon polyps Father    Diabetes Mother    Heart disease Mother, Father    No Known Problems Sister, Daughter, Son, Sister, Son    Stroke Father        Tobacco Use    Smoking status: Former Smoker     Packs/day: 1.00     Years: 45.00     Pack years: 45.00     Types: Cigarettes     Last attempt to quit: 2005     Years since quittin.9    Smokeless tobacco: Never Used    Tobacco comment: 1-1.5 ppd every day.   Substance and Sexual Activity    Alcohol use: No     Frequency: Never    Drug use: No    Sexual activity: No     ROS  Objective:     Vital Signs (Most Recent):  Temp: 98 °F (36.7 °C) (18)  Pulse: 66 (18)  Resp: 18 (18)  BP: 126/79 (18)  SpO2: 100 % (18) Vital Signs (24h Range):  Temp:  [97.7 °F (36.5 °C)-98.2 °F (36.8 °C)] 98 °F (36.7 °C)  Pulse:  [65-76] 66  Resp:  [18] 18  SpO2:  [96 %-100 %] 100 %  BP: (121-130)/(58-79) 126/79     Weight: 104.8 kg (231 lb 0.7 oz)  Height: 5' 7" (170.2 cm)  Body mass index is 36.19 kg/m².      Intake/Output Summary (Last 24 hours) at 2018 0716  Last data filed at 2018 0303  Gross per 24 hour   Intake 100 ml   Output --   Net 100 ml       Ortho/SPM Exam    Gen:  No acute distress  CV:  Peripherally well-perfused.  Pulses 2+ bilaterally.  Lungs:  Normal respiratory effort.  Abdomen:  Soft, non-tender, non-distended  Head/Neck:  Normocephalic.  Atraumatic. No TTP, AROM and PROM intact without pain  Neuro:  CN intact without deficit, SILT throughout B/L Upper & Lower Extremities  Pelvis: No TTP, Stable to direct anterior pressure over ASIS.    MSK:  RUE:  - there is some mild swelling and erythema of the dorsal hand and distal forearm  - AROM and PROM of the shoulder, elbow, wrist and hand is intact and without pain  -terminal ROM of wrist is " mildly painful  - AIN/PIN/Radial/Median/Ulnar Nerves assessed in isolation without deficit  - SILT throughout  - Radial & Ulnar arteries palpated 2+  - Capillary Refill <3s        Significant Labs:   CBC:   Recent Labs   Lab 11/28/18 0229   WBC 8.18   HGB 14.1   HCT 44.5        CRP:   Recent Labs   Lab 11/28/18 0229   CRP 11.7*     All pertinent labs within the past 24 hours have been reviewed.    Significant Imaging: I have reviewed and interpreted all pertinent imaging results/findings.   No acute fracture

## 2018-11-29 VITALS
WEIGHT: 227.75 LBS | HEART RATE: 71 BPM | HEIGHT: 67 IN | BODY MASS INDEX: 35.75 KG/M2 | SYSTOLIC BLOOD PRESSURE: 118 MMHG | OXYGEN SATURATION: 95 % | DIASTOLIC BLOOD PRESSURE: 59 MMHG | RESPIRATION RATE: 17 BRPM | TEMPERATURE: 98 F

## 2018-11-29 LAB
ALBUMIN SERPL BCP-MCNC: 3.3 G/DL
ALP SERPL-CCNC: 107 U/L
ALT SERPL W/O P-5'-P-CCNC: 25 U/L
ANION GAP SERPL CALC-SCNC: 12 MMOL/L
AST SERPL-CCNC: 32 U/L
BASOPHILS # BLD AUTO: 0.04 K/UL
BASOPHILS NFR BLD: 0.4 %
BILIRUB SERPL-MCNC: 0.6 MG/DL
BUN SERPL-MCNC: 25 MG/DL
CALCIUM SERPL-MCNC: 8.7 MG/DL
CHLORIDE SERPL-SCNC: 104 MMOL/L
CO2 SERPL-SCNC: 23 MMOL/L
CREAT SERPL-MCNC: 1.2 MG/DL
DIFFERENTIAL METHOD: ABNORMAL
EOSINOPHIL # BLD AUTO: 0.2 K/UL
EOSINOPHIL NFR BLD: 2.5 %
ERYTHROCYTE [DISTWIDTH] IN BLOOD BY AUTOMATED COUNT: 14.4 %
EST. GFR  (AFRICAN AMERICAN): >60 ML/MIN/1.73 M^2
EST. GFR  (NON AFRICAN AMERICAN): >60 ML/MIN/1.73 M^2
GLUCOSE SERPL-MCNC: 140 MG/DL
HCT VFR BLD AUTO: 41.8 %
HGB BLD-MCNC: 13.4 G/DL
IMM GRANULOCYTES # BLD AUTO: 0.05 K/UL
IMM GRANULOCYTES NFR BLD AUTO: 0.5 %
LYMPHOCYTES # BLD AUTO: 2.1 K/UL
LYMPHOCYTES NFR BLD: 22.7 %
MCH RBC QN AUTO: 28 PG
MCHC RBC AUTO-ENTMCNC: 32.1 G/DL
MCV RBC AUTO: 87 FL
MONOCYTES # BLD AUTO: 0.8 K/UL
MONOCYTES NFR BLD: 8.4 %
NEUTROPHILS # BLD AUTO: 6 K/UL
NEUTROPHILS NFR BLD: 65.5 %
NRBC BLD-RTO: 0 /100 WBC
PLATELET # BLD AUTO: 283 K/UL
PMV BLD AUTO: 10.8 FL
POTASSIUM SERPL-SCNC: 4.5 MMOL/L
PROT SERPL-MCNC: 6.7 G/DL
RBC # BLD AUTO: 4.78 M/UL
SODIUM SERPL-SCNC: 139 MMOL/L
VANCOMYCIN SERPL-MCNC: 17 UG/ML
WBC # BLD AUTO: 9.19 K/UL

## 2018-11-29 PROCEDURE — 63600175 PHARM REV CODE 636 W HCPCS: Performed by: PHYSICIAN ASSISTANT

## 2018-11-29 PROCEDURE — 25000003 PHARM REV CODE 250: Performed by: PHYSICIAN ASSISTANT

## 2018-11-29 PROCEDURE — G0378 HOSPITAL OBSERVATION PER HR: HCPCS

## 2018-11-29 PROCEDURE — 85025 COMPLETE CBC W/AUTO DIFF WBC: CPT

## 2018-11-29 PROCEDURE — 99217 PR OBSERVATION CARE DISCHARGE: CPT | Mod: ,,, | Performed by: PHYSICIAN ASSISTANT

## 2018-11-29 PROCEDURE — 80053 COMPREHEN METABOLIC PANEL: CPT

## 2018-11-29 PROCEDURE — 80202 ASSAY OF VANCOMYCIN: CPT

## 2018-11-29 PROCEDURE — 36415 COLL VENOUS BLD VENIPUNCTURE: CPT

## 2018-11-29 RX ORDER — CLINDAMYCIN HYDROCHLORIDE 150 MG/1
450 CAPSULE ORAL EVERY 6 HOURS
Status: DISCONTINUED | OUTPATIENT
Start: 2018-11-29 | End: 2018-11-29 | Stop reason: HOSPADM

## 2018-11-29 RX ORDER — CLINDAMYCIN HYDROCHLORIDE 150 MG/1
450 CAPSULE ORAL EVERY 6 HOURS
Qty: 96 CAPSULE | Refills: 0 | Status: SHIPPED | OUTPATIENT
Start: 2018-11-29 | End: 2018-12-07

## 2018-11-29 RX ADMIN — ATORVASTATIN CALCIUM 80 MG: 20 TABLET, FILM COATED ORAL at 08:11

## 2018-11-29 RX ADMIN — COLCHICINE 0.6 MG: 0.6 TABLET, FILM COATED ORAL at 08:11

## 2018-11-29 RX ADMIN — CLINDAMYCIN HYDROCHLORIDE 450 MG: 150 CAPSULE ORAL at 11:11

## 2018-11-29 RX ADMIN — FUROSEMIDE 40 MG: 40 TABLET ORAL at 08:11

## 2018-11-29 RX ADMIN — CLOPIDOGREL 75 MG: 75 TABLET, FILM COATED ORAL at 08:11

## 2018-11-29 RX ADMIN — VANCOMYCIN HYDROCHLORIDE 1500 MG: 10 INJECTION, POWDER, LYOPHILIZED, FOR SOLUTION INTRAVENOUS at 05:11

## 2018-11-29 RX ADMIN — Medication 1 CAPSULE: at 11:11

## 2018-11-29 RX ADMIN — ASPIRIN 325 MG: 325 TABLET, DELAYED RELEASE ORAL at 08:11

## 2018-11-29 RX ADMIN — METOPROLOL SUCCINATE 50 MG: 50 TABLET, EXTENDED RELEASE ORAL at 08:11

## 2018-11-29 RX ADMIN — AMIODARONE HYDROCHLORIDE 300 MG: 200 TABLET ORAL at 08:11

## 2018-11-29 RX ADMIN — OXYCODONE HYDROCHLORIDE AND ACETAMINOPHEN 1 TABLET: 5; 325 TABLET ORAL at 06:11

## 2018-11-29 NOTE — NURSING
Pt discharging to home. Pt educated on discharge instructions, follow up care and medications, uses and possible side effects. Pt denies any pain or SOB upon discharge. Pt reports receiving all belongings. IV site remove, intact. Pt ambulated off floor with family.

## 2018-11-29 NOTE — PLAN OF CARE
SW following for d/c needs. Pt expected to d/c home with no needs.        Zayda Man, MSW, LCSW  [facility]  [id number]  s

## 2018-11-29 NOTE — PLAN OF CARE
Problem: Patient Care Overview  Goal: Plan of Care Review  Outcome: Ongoing (interventions implemented as appropriate)  Pt with no falls or injuries this shift. Pt reports pain level 0/10 post pain med. Pt afebrile, dressing intact to RUE, pt on iv antibiotics

## 2018-11-29 NOTE — ASSESSMENT & PLAN NOTE
Patient with right wrist swelling and erythema X 3 days. History of right wrist sprain with residual pain, gout, and carpal tunnel. Reports cut on wrist about 1 month ago. Denies insect bites, recent trauma, IV drug use. Afebrile without leukocytosis.  - CXR with moderate soft tissue edema of wrist and hand. No evidence of fracture  - ESR 25, CRP 11.7  - procal WNL  - cultures unremarkable  - IV Vanc started, transitioned to PO Clindamycin 450mg TID for 8 days  - Ortho consulted, aspiration attempted with no fluid  - Arm wrapped and elevated  - stable for discharge with PCP follow up

## 2018-11-29 NOTE — ASSESSMENT & PLAN NOTE
Cardiomyopathy, ischemic  ICD (implantable cardioverter-defibrillator) in place    Patient follows with Dr. Gongora in [facility]. Enrolled in [employer]  - Echo 4/2018: LVEF upper teens- low 20s, Mild left atrial enlargement, grade 1 diastolic dysfunction, trivial mitral regurgitation and tricuspid regurgitation.   - continue home Lasix, metoprolol  - fluid restriction

## 2018-11-29 NOTE — PLAN OF CARE
Problem: Skin Integrity Impairment, Risk/Actual (Adult)  Goal: Identify Related Risk Factors and Signs and Symptoms  Related risk factors and signs and symptoms are identified upon initiation of Human Response Clinical Practice Guideline (CPG)  Outcome: Ongoing (interventions implemented as appropriate)  Patient has cellulitis on right hand that runs from his hand/wrist to his elbow.  It has been wrapped with gauze and and Ace Bandage.

## 2018-11-29 NOTE — PLAN OF CARE
Problem: Patient Care Overview  Goal: Plan of Care Review  Plan of care reviewed with Pt. Pt alert and oriented X4. Pt able to communicate needs but denies any concerns at this time. Pt expecting to discharge today. Pt ambulating around floor and to bathroom. Pt denies any pain or SOB at this time. Nursing will continue to monitor for changes in status.

## 2018-11-29 NOTE — HOSPITAL COURSE
Patient admitted to observation for right arm cellulitis. Orthopedic surgery consulted, aspirated with no fluid. Patient started on IV Vancomycin with good response, swelling improved and redness resolving. Patient transitioned to PO Clindamycin for 8 days. Patient stable for discharge with PCP follow up.

## 2018-11-30 ENCOUNTER — NURSE TRIAGE (OUTPATIENT)
Dept: ADMINISTRATIVE | Facility: CLINIC | Age: 68
End: 2018-11-30

## 2018-11-30 PROBLEM — L03.113 CELLULITIS OF RIGHT UPPER EXTREMITY: Status: ACTIVE | Noted: 2018-11-28

## 2018-11-30 NOTE — DISCHARGE SUMMARY
[facility] Hospital Medicine  Discharge Summary      Patient Name: Audrey Oneli Jr.  MRN: [id number]  Admission Date: 11/28/2018  Hospital Length of Stay: 0 days  Discharge Date and Time: 11/29/2018  4:56 PM  Attending Physician: Angelika att. providers found   Discharging Provider: Hannah Gan PA-C  Primary Care Provider: January Khan MD  [facility] Medicine Team: [facility] E Hannah Gan PA-C    HPI:   Patient is a 68 year old male with a PMHx of CAD, CHF, HLD, PE, gout, R carpal tunnel (s/p release 4/2018), and R trigger finger, who was admitted to observation for cellulitis of right hand and wrist. Patient complains of right wrist and hand pain X 2 days. Pain began at the ulnar aspect of the wrist and is now through the entire wrist and hand with radiation through the forearm to the axilla. Pain was 10/10 on arrival but 7/10 after toradol. Pt reports decreased ROM in right hand and wrist due to pain. It is associated with erythema, warmth, and swelling that began at fingertips and also spread through forearm. Patient reports swelling has decreased some. He states he noticed a cut on his right wrist about 3 weeks ago. He denies fever, chills, insect bite, IV drug use, numbness/ tingling, or weakness. Of note, patient had been seen multiple times in ED in 9/18 for pain from a right wrist sprain after a fall.    In the ED, patient afebrile without leukocytosis. ESR 25, CRP 11.7. Procalcitonin WNL. Xray of R wrist and hand show moderate soft tissue edema involving the hand and wrist, no evidence of fracture. Dose of toradol improved wrist pain and ROM. Ortho evaluated, aspiration attempted with absolutely no fluid. Cultures pending. Hand was wrapped and elevated. Started on IV vanc.    * No surgery found *      Hospital Course:   Patient admitted to observation for right arm cellulitis. Orthopedic surgery consulted, aspirated with no fluid. Patient started on IV Vancomycin with good  response, swelling improved and redness resolving. Patient transitioned to PO Clindamycin for 8 days. Patient stable for discharge with PCP follow up.     Consults:   Consults (From admission, onward)        Status Ordering Provider     Inpatient consult to Orthopedic Surgery  Once     Provider:  (Not yet assigned)    Completed TAVIA LORENZO          * Cellulitis    Patient with right wrist swelling and erythema X 3 days. History of right wrist sprain with residual pain, gout, and carpal tunnel. Reports cut on wrist about 1 month ago. Denies insect bites, recent trauma, IV drug use. Afebrile without leukocytosis.  - CXR with moderate soft tissue edema of wrist and hand. No evidence of fracture  - ESR 25, CRP 11.7  - procal WNL  - cultures unremarkable  - IV Vanc started, transitioned to PO Clindamycin 450mg TID for 8 days  - Ortho consulted, aspiration attempted with no fluid  - Arm wrapped and elevated  - stable for discharge with PCP follow up     Chronic combined systolic and diastolic heart failure    Cardiomyopathy, ischemic  ICD (implantable cardioverter-defibrillator) in place    Patient follows with Dr. Gongora in [facility]. Enrolled in [employer]  - Echo 4/2018: LVEF upper teens- low 20s, Mild left atrial enlargement, grade 1 diastolic dysfunction, trivial mitral regurgitation and tricuspid regurgitation.   - continue home Lasix, metoprolol  - fluid restriction     Idiopathic chronic gout of left foot without tophus    - Uric acid 8.2  - Continue allopurinol  - colchicine for now     CKD (chronic kidney disease), stage III    Current GFR >60. Cr 1.1  - monitor     Essential hypertension    Controlled  - continue home metoprolol     Coronary artery disease involving native coronary artery of native heart without angina pectoris    - continue ASA, lipitor, Plavix       Final Active Diagnoses:    Diagnosis Date Noted POA    PRINCIPAL PROBLEM:  Cellulitis [L03.90] 11/28/2018 Yes    Chronic combined systolic and  diastolic heart failure [I50.42] 11/26/2012 Yes     Chronic    Idiopathic chronic gout of left foot without tophus [M1A.0720] 10/12/2017 Yes    CKD (chronic kidney disease), stage III [N18.3] 06/02/2017 Yes    ICD (implantable cardioverter-defibrillator) in place [Z95.810] 12/13/2015 Yes    Essential hypertension [I10] 11/15/2015 Yes    Coronary artery disease involving native coronary artery of native heart without angina pectoris [I25.10] 11/26/2012 Yes    Cardiomyopathy, ischemic [I25.5] 11/26/2012 Yes     Chronic      Problems Resolved During this Admission:       Discharged Condition: good    Disposition: Home or Self Care    Follow Up:  Follow-up Information     Kerry Estrella PA-C On 12/11/2018.    Specialty:  Internal Medicine  Why:  at 2:30pm; Dr. Sin not available  Contact information:  [address] [place] [id number]  [phone]                 Patient Instructions:      Diet Cardiac     Notify your health care provider if you experience any of the following:  temperature >100.4     Notify your health care provider if you experience any of the following:  redness, tenderness, or signs of infection (pain, swelling, redness, odor or green/yellow discharge around incision site)     Notify your health care provider if you experience any of the following:  worsening rash     Activity as tolerated       Significant Diagnostic Studies: Labs:   BMP:   Recent Labs   Lab 11/29/18  0820   *      K 4.5      CO2 23   BUN 25*   CREATININE 1.2   CALCIUM 8.7   , CBC   Recent Labs   Lab 11/29/18  0820   WBC 9.19   HGB 13.4*   HCT 41.8       and All labs within the past 24 hours have been reviewed    Pending Diagnostic Studies:     None         Medications:  Reconciled Home Medications:      Medication List      START taking these medications    clindamycin 150 MG capsule  Commonly known as:  CLEOCIN  Take 3 capsules (450 mg total) by mouth every 6 (six) hours. for 8 days      Lactobacillus rhamnosus GG 10 billion cell capsule  Commonly known as:  CULTURELLE  Take 1 capsule by mouth once daily.        CHANGE how you take these medications    docusate sodium 50 MG capsule  Commonly known as:  COLACE  Take 1 capsule (50 mg total) by mouth 2 (two) times daily.  What changed:    · when to take this  · reasons to take this        CONTINUE taking these medications    allopurinol 100 MG tablet  Commonly known as:  ZYLOPRIM  TAKE 1 TABLET(100 MG) BY MOUTH EVERY DAY     * amiodarone 200 MG Tab  Commonly known as:  PACERONE  TAKE 1 AND 1/2 TABLETS(300 MG) BY MOUTH EVERY DAY     * amiodarone 200 MG Tab  Commonly known as:  PACERONE  TAKE 1 TABLET BY MOUTH ONCE DAILY     aspirin 325 MG EC tablet  Commonly known as:  ECOTRIN  Take 325 mg by mouth once daily.     atorvastatin 80 MG tablet  Commonly known as:  LIPITOR  TAKE 1 TABLET(80 MG) BY MOUTH EVERY DAY     clopidogrel 75 mg tablet  Commonly known as:  PLAVIX  Take 1 tablet (75 mg total) by mouth once daily.     colchicine 0.6 mg tablet  Commonly known as:  COLCRYS  TAKE 2 TABLETS BY MOUTH ON DAY 1, THEN TAKE 1 TABLET BY MOUTH EVERY DAY AS NEEDED THEREAFTER     diphenhydrAMINE 50 MG tablet  Commonly known as:  BENADRYL  Take 1 tab at 6 pm & 11 pm on night before your proc, then 1 tab at 6 am on day of proc     furosemide 40 MG tablet  Commonly known as:  LASIX  TAKE 1 TABLET(40 MG) BY MOUTH EVERY DAY     gabapentin 100 MG capsule  Commonly known as:  NEURONTIN  Take 1 capsule (100 mg total) by mouth 2 (two) times daily. for 5 days     HYDROcodone-acetaminophen  mg per tablet  Commonly known as:  NORCO  Take 1 tablet by mouth every 6 (six) hours as needed for Pain.     lidocaine 5 %  Commonly known as:  LIDODERM  Place 1 patch onto the skin once daily. Remove & Discard patch within 12 hours or as directed by MD     metoprolol succinate 50 MG 24 hr tablet  Commonly known as:  TOPROL-XL  TAKE 1 TABLET BY MOUTH EVERY DAY      oxyCODONE-acetaminophen  mg per tablet  Commonly known as:  PERCOCET  Take 1 tablet by mouth every 4 (four) hours as needed for Pain.         * This list has 2 medication(s) that are the same as other medications prescribed for you. Read the directions carefully, and ask your doctor or other care provider to review them with you.                Indwelling Lines/Drains at time of discharge:   Lines/Drains/Airways          None          Time spent on the discharge of patient: 36 minutes  Patient was seen and examined on the date of discharge and determined to be suitable for discharge.         Hannah Gan PA-C  Department of Hospital Medicine  [facility]

## 2018-11-30 NOTE — TELEPHONE ENCOUNTER
Family thought they were supposed to be getting a prescription for probiotic. Explained that it was over the counter. Verbalized understanding. Home ccare advice was given    Reason for Disposition   Caller has medication question only, adult not sick, and triager answers question    Protocols used: ST MEDICATION QUESTION CALL-A-AH

## 2018-11-30 NOTE — PLAN OF CARE
11/30/18 0808   Final Note   Assessment Type Final Discharge Note     Patient discharged home with no needs on 11/29/18.

## 2018-12-14 ENCOUNTER — TELEPHONE (OUTPATIENT)
Dept: TRANSPLANT | Facility: CLINIC | Age: 68
End: 2018-12-14

## 2018-12-14 DIAGNOSIS — I47.20 VT (VENTRICULAR TACHYCARDIA): ICD-10-CM

## 2018-12-14 RX ORDER — AMIODARONE HYDROCHLORIDE 200 MG/1
200 TABLET ORAL DAILY
Qty: 30 TABLET | Refills: 5 | Status: ON HOLD
Start: 2018-12-14 | End: 2019-01-15 | Stop reason: HOSPADM

## 2018-12-14 NOTE — TELEPHONE ENCOUNTER
"Early this afternoon received message from JUSTO DESAI Re: " pts sister Riana needed clarification on her brothers medication Amiodarone 200 mg and asked for a return call to [phone]".  MA indicated "this medication required a Prior Authorization -insurance will not cover because he is taking 300 mg per  order."  4:00-4:25 pm:  I reviewed chart and noted for a while pt was taking 300 mg Amiodarone :( 1 1/2 tablets) once daily. However see clinic note 8/16/2018 by Shefali MARTÍNEZ, JIMBO-C indicating to "reduce Amiodarone to 200 mg daily ( per instructions from Dr. Pal-see result note PFT's 5/4/2018"  Then 9/18/18 Rx changed to 1 1/2 tablets once kvtwo=542 mg by Dr. Marshall   And on 11/21/18 Dr. Marshall sent Rx lowering Amiodarone to 200 mg ( 1 tablet ) once daily.  Reviewed hospital stays and procedures as well as EP notes and telephone encounters. Did not see documented indication to increase Amio back to 300 mg.  Discussed with Dr. Marshall and he reviewed some as well.   Dr. Marshall advised pt should be taking Amiodarone 200 mg ( 1 tablet ) by mouth once daily. Had attempted to discuss with sister, Lauryn and returned call to her-KELLY WHYTE asking she call me back. Left my name, day, date, time and office contact phone number.  Will update medication record for now.   "

## 2018-12-31 RX ORDER — METOPROLOL SUCCINATE 50 MG/1
TABLET, EXTENDED RELEASE ORAL
Qty: 90 TABLET | Refills: 0 | Status: SHIPPED | OUTPATIENT
Start: 2018-12-31 | End: 2019-01-25 | Stop reason: SDUPTHER

## 2019-01-14 PROCEDURE — 99285 EMERGENCY DEPT VISIT HI MDM: CPT | Mod: 25

## 2019-01-14 PROCEDURE — 93010 ELECTROCARDIOGRAM REPORT: CPT | Mod: ,,, | Performed by: INTERNAL MEDICINE

## 2019-01-14 PROCEDURE — 96374 THER/PROPH/DIAG INJ IV PUSH: CPT

## 2019-01-14 PROCEDURE — 99285 PR EMERGENCY DEPT VISIT,LEVEL V: ICD-10-PCS | Mod: ,,, | Performed by: EMERGENCY MEDICINE

## 2019-01-14 PROCEDURE — 93005 ELECTROCARDIOGRAM TRACING: CPT

## 2019-01-14 PROCEDURE — 99285 EMERGENCY DEPT VISIT HI MDM: CPT | Mod: ,,, | Performed by: EMERGENCY MEDICINE

## 2019-01-14 PROCEDURE — 93010 EKG 12-LEAD: ICD-10-PCS | Mod: ,,, | Performed by: INTERNAL MEDICINE

## 2019-01-15 ENCOUNTER — HOSPITAL ENCOUNTER (OUTPATIENT)
Facility: HOSPITAL | Age: 69
Discharge: HOME | End: 2019-01-15
Attending: EMERGENCY MEDICINE | Admitting: INTERNAL MEDICINE
Payer: MEDICARE

## 2019-01-15 VITALS
BODY MASS INDEX: 37.27 KG/M2 | SYSTOLIC BLOOD PRESSURE: 121 MMHG | TEMPERATURE: 98 F | DIASTOLIC BLOOD PRESSURE: 67 MMHG | RESPIRATION RATE: 17 BRPM | HEIGHT: 67 IN | OXYGEN SATURATION: 99 % | WEIGHT: 237.44 LBS | HEART RATE: 62 BPM

## 2019-01-15 DIAGNOSIS — R07.9 CHEST PAIN: ICD-10-CM

## 2019-01-15 DIAGNOSIS — I47.20 VT (VENTRICULAR TACHYCARDIA): ICD-10-CM

## 2019-01-15 DIAGNOSIS — I50.43 ACUTE ON CHRONIC COMBINED SYSTOLIC AND DIASTOLIC CONGESTIVE HEART FAILURE: Primary | ICD-10-CM

## 2019-01-15 DIAGNOSIS — I50.43 ACUTE ON CHRONIC COMBINED SYSTOLIC AND DIASTOLIC HEART FAILURE: ICD-10-CM

## 2019-01-15 DIAGNOSIS — R06.02 SHORTNESS OF BREATH: ICD-10-CM

## 2019-01-15 LAB
ALBUMIN SERPL BCP-MCNC: 3.7 G/DL
ALP SERPL-CCNC: 107 U/L
ALT SERPL W/O P-5'-P-CCNC: 40 U/L
ANION GAP SERPL CALC-SCNC: 10 MMOL/L
ANION GAP SERPL CALC-SCNC: 8 MMOL/L
ANION GAP SERPL CALC-SCNC: 9 MMOL/L
ASCENDING AORTA: 3.48 CM
AST SERPL-CCNC: 34 U/L
AV INDEX (PROSTH): 0.55
AV MEAN GRADIENT: 5.63 MMHG
AV PEAK GRADIENT: 11.42 MMHG
AV VALVE AREA: 2.36 CM2
BASOPHILS # BLD AUTO: 0.06 K/UL
BASOPHILS NFR BLD: 0.8 %
BILIRUB SERPL-MCNC: 0.5 MG/DL
BNP SERPL-MCNC: 199 PG/ML
BUN SERPL-MCNC: 21 MG/DL
BUN SERPL-MCNC: 22 MG/DL
BUN SERPL-MCNC: 23 MG/DL
CALCIUM SERPL-MCNC: 8.8 MG/DL
CALCIUM SERPL-MCNC: 8.9 MG/DL
CALCIUM SERPL-MCNC: 9.1 MG/DL
CHLORIDE SERPL-SCNC: 104 MMOL/L
CHLORIDE SERPL-SCNC: 107 MMOL/L
CHLORIDE SERPL-SCNC: 109 MMOL/L
CO2 SERPL-SCNC: 24 MMOL/L
CO2 SERPL-SCNC: 29 MMOL/L
CO2 SERPL-SCNC: 29 MMOL/L
CREAT SERPL-MCNC: 1.1 MG/DL
CREAT SERPL-MCNC: 1.2 MG/DL
CREAT SERPL-MCNC: 1.3 MG/DL
CV ECHO LV RWT: 0.23 CM
DIFFERENTIAL METHOD: ABNORMAL
DOP CALC AO PEAK VEL: 1.69 M/S
DOP CALC AO VTI: 29.88 CM
DOP CALC LVOT AREA: 4.34 CM2
DOP CALC LVOT DIAMETER: 2.35 CM
DOP CALC LVOT STROKE VOLUME: 70.62 CM3
DOP CALCLVOT PEAK VEL VTI: 16.29 CM
E WAVE DECELERATION TIME: 189.07 MSEC
E/A RATIO: 0.77
E/E' RATIO: 13.45
ECHO LV POSTERIOR WALL: 0.83 CM (ref 0.6–1.1)
EOSINOPHIL # BLD AUTO: 0.2 K/UL
EOSINOPHIL NFR BLD: 1.9 %
ERYTHROCYTE [DISTWIDTH] IN BLOOD BY AUTOMATED COUNT: 14.6 %
EST. GFR  (AFRICAN AMERICAN): >60 ML/MIN/1.73 M^2
EST. GFR  (NON AFRICAN AMERICAN): 56.9 ML/MIN/1.73 M^2
EST. GFR  (NON AFRICAN AMERICAN): >60 ML/MIN/1.73 M^2
EST. GFR  (NON AFRICAN AMERICAN): >60 ML/MIN/1.73 M^2
ESTIMATED AVG GLUCOSE: 123 MG/DL
FRACTIONAL SHORTENING: 11 % (ref 28–44)
GLUCOSE SERPL-MCNC: 100 MG/DL
GLUCOSE SERPL-MCNC: 113 MG/DL
GLUCOSE SERPL-MCNC: 117 MG/DL
HBA1C MFR BLD HPLC: 5.9 %
HCT VFR BLD AUTO: 43.3 %
HGB BLD-MCNC: 13.6 G/DL
IMM GRANULOCYTES # BLD AUTO: 0.04 K/UL
IMM GRANULOCYTES NFR BLD AUTO: 0.5 %
INR PPP: 0.9
INTERVENTRICULAR SEPTUM: 0.82 CM (ref 0.6–1.1)
LA MAJOR: 5.71 CM
LA MINOR: 5.7 CM
LA WIDTH: 4.7 CM
LEFT ATRIUM SIZE: 4.77 CM
LEFT ATRIUM VOLUME: 108.72 CM3
LEFT INTERNAL DIMENSION IN SYSTOLE: 6.51 CM (ref 2.1–4)
LEFT VENTRICLE DIASTOLIC VOLUME: 279.09 ML
LEFT VENTRICLE SYSTOLIC VOLUME: 216.58 ML
LEFT VENTRICULAR INTERNAL DIMENSION IN DIASTOLE: 7.28 CM (ref 3.5–6)
LEFT VENTRICULAR MASS: 272.08 G
LV LATERAL E/E' RATIO: 12.33
LV SEPTAL E/E' RATIO: 14.8
LYMPHOCYTES # BLD AUTO: 2 K/UL
LYMPHOCYTES NFR BLD: 26 %
MCH RBC QN AUTO: 28.1 PG
MCHC RBC AUTO-ENTMCNC: 31.4 G/DL
MCV RBC AUTO: 90 FL
MONOCYTES # BLD AUTO: 0.7 K/UL
MONOCYTES NFR BLD: 8.7 %
MV PEAK A VEL: 0.96 M/S
MV PEAK E VEL: 0.74 M/S
NEUTROPHILS # BLD AUTO: 4.8 K/UL
NEUTROPHILS NFR BLD: 62.1 %
NRBC BLD-RTO: 0 /100 WBC
PLATELET # BLD AUTO: 251 K/UL
PMV BLD AUTO: 10.8 FL
POCT GLUCOSE: 104 MG/DL (ref 70–110)
POCT GLUCOSE: 90 MG/DL (ref 70–110)
POTASSIUM SERPL-SCNC: 3.4 MMOL/L
POTASSIUM SERPL-SCNC: 3.8 MMOL/L
POTASSIUM SERPL-SCNC: 4.1 MMOL/L
PROT SERPL-MCNC: 7.2 G/DL
PROTHROMBIN TIME: 9.7 SEC
PULM VEIN S/D RATIO: 1.33
PV PEAK D VEL: 0.42 M/S
PV PEAK S VEL: 0.56 M/S
RA MAJOR: 4.24 CM
RA PRESSURE: 3 MMHG
RA WIDTH: 3.61 CM
RBC # BLD AUTO: 4.84 M/UL
RIGHT VENTRICULAR END-DIASTOLIC DIMENSION: 3.08 CM
SINUS: 3.27 CM
SODIUM SERPL-SCNC: 142 MMOL/L
SODIUM SERPL-SCNC: 143 MMOL/L
SODIUM SERPL-SCNC: 144 MMOL/L
STJ: 2.55 CM
TDI LATERAL: 0.06
TDI SEPTAL: 0.05
TDI: 0.06
TRICUSPID ANNULAR PLANE SYSTOLIC EXCURSION: 1.96 CM
TROPONIN I SERPL DL<=0.01 NG/ML-MCNC: 0.07 NG/ML
TROPONIN I SERPL DL<=0.01 NG/ML-MCNC: 0.08 NG/ML
WBC # BLD AUTO: 7.8 K/UL

## 2019-01-15 PROCEDURE — G0378 HOSPITAL OBSERVATION PER HR: HCPCS

## 2019-01-15 PROCEDURE — 85610 PROTHROMBIN TIME: CPT

## 2019-01-15 PROCEDURE — 63600175 PHARM REV CODE 636 W HCPCS: Performed by: STUDENT IN AN ORGANIZED HEALTH CARE EDUCATION/TRAINING PROGRAM

## 2019-01-15 PROCEDURE — 99217 PR OBSERVATION CARE DISCHARGE: ICD-10-PCS | Mod: GC,,, | Performed by: HOSPITALIST

## 2019-01-15 PROCEDURE — 84484 ASSAY OF TROPONIN QUANT: CPT | Mod: 91

## 2019-01-15 PROCEDURE — 25000003 PHARM REV CODE 250: Performed by: EMERGENCY MEDICINE

## 2019-01-15 PROCEDURE — 84484 ASSAY OF TROPONIN QUANT: CPT

## 2019-01-15 PROCEDURE — 36415 COLL VENOUS BLD VENIPUNCTURE: CPT

## 2019-01-15 PROCEDURE — 63600175 PHARM REV CODE 636 W HCPCS: Performed by: INTERNAL MEDICINE

## 2019-01-15 PROCEDURE — 80053 COMPREHEN METABOLIC PANEL: CPT

## 2019-01-15 PROCEDURE — 25000003 PHARM REV CODE 250: Performed by: STUDENT IN AN ORGANIZED HEALTH CARE EDUCATION/TRAINING PROGRAM

## 2019-01-15 PROCEDURE — 83036 HEMOGLOBIN GLYCOSYLATED A1C: CPT

## 2019-01-15 PROCEDURE — 80048 BASIC METABOLIC PNL TOTAL CA: CPT | Mod: 91

## 2019-01-15 PROCEDURE — 99217 PR OBSERVATION CARE DISCHARGE: CPT | Mod: GC,,, | Performed by: HOSPITALIST

## 2019-01-15 PROCEDURE — 82962 GLUCOSE BLOOD TEST: CPT

## 2019-01-15 PROCEDURE — 85025 COMPLETE CBC W/AUTO DIFF WBC: CPT

## 2019-01-15 PROCEDURE — 83880 ASSAY OF NATRIURETIC PEPTIDE: CPT

## 2019-01-15 PROCEDURE — 63600175 PHARM REV CODE 636 W HCPCS: Performed by: EMERGENCY MEDICINE

## 2019-01-15 RX ORDER — OXYCODONE AND ACETAMINOPHEN 10; 325 MG/1; MG/1
1 TABLET ORAL EVERY 4 HOURS PRN
Status: DISCONTINUED | OUTPATIENT
Start: 2019-01-15 | End: 2019-01-15 | Stop reason: HOSPADM

## 2019-01-15 RX ORDER — AMOXICILLIN 250 MG
1 CAPSULE ORAL 2 TIMES DAILY PRN
Status: DISCONTINUED | OUTPATIENT
Start: 2019-01-15 | End: 2019-01-15 | Stop reason: HOSPADM

## 2019-01-15 RX ORDER — AMIODARONE HYDROCHLORIDE 200 MG/1
200 TABLET ORAL DAILY
Status: DISCONTINUED | OUTPATIENT
Start: 2019-01-15 | End: 2019-01-15 | Stop reason: HOSPADM

## 2019-01-15 RX ORDER — SODIUM CHLORIDE 0.9 % (FLUSH) 0.9 %
5 SYRINGE (ML) INJECTION
Status: DISCONTINUED | OUTPATIENT
Start: 2019-01-15 | End: 2019-01-15 | Stop reason: HOSPADM

## 2019-01-15 RX ORDER — IBUPROFEN 200 MG
16 TABLET ORAL
Status: DISCONTINUED | OUTPATIENT
Start: 2019-01-15 | End: 2019-01-15 | Stop reason: HOSPADM

## 2019-01-15 RX ORDER — AMIODARONE HYDROCHLORIDE 100 MG/1
300 TABLET ORAL DAILY
Qty: 90 TABLET | Refills: 0 | Status: SHIPPED | OUTPATIENT
Start: 2019-01-15 | End: 2019-04-15 | Stop reason: SDUPTHER

## 2019-01-15 RX ORDER — RAMELTEON 8 MG/1
8 TABLET ORAL NIGHTLY PRN
Status: DISCONTINUED | OUTPATIENT
Start: 2019-01-15 | End: 2019-01-15 | Stop reason: HOSPADM

## 2019-01-15 RX ORDER — ALLOPURINOL 100 MG/1
100 TABLET ORAL DAILY
Status: DISCONTINUED | OUTPATIENT
Start: 2019-01-15 | End: 2019-01-15 | Stop reason: HOSPADM

## 2019-01-15 RX ORDER — METOPROLOL SUCCINATE 50 MG/1
50 TABLET, EXTENDED RELEASE ORAL DAILY
Status: DISCONTINUED | OUTPATIENT
Start: 2019-01-15 | End: 2019-01-15 | Stop reason: HOSPADM

## 2019-01-15 RX ORDER — ATORVASTATIN CALCIUM 20 MG/1
80 TABLET, FILM COATED ORAL DAILY
Status: DISCONTINUED | OUTPATIENT
Start: 2019-01-15 | End: 2019-01-15 | Stop reason: HOSPADM

## 2019-01-15 RX ORDER — FUROSEMIDE 10 MG/ML
60 INJECTION INTRAMUSCULAR; INTRAVENOUS 2 TIMES DAILY
Status: DISCONTINUED | OUTPATIENT
Start: 2019-01-15 | End: 2019-01-15 | Stop reason: HOSPADM

## 2019-01-15 RX ORDER — ENOXAPARIN SODIUM 100 MG/ML
40 INJECTION SUBCUTANEOUS EVERY 24 HOURS
Status: DISCONTINUED | OUTPATIENT
Start: 2019-01-15 | End: 2019-01-15 | Stop reason: HOSPADM

## 2019-01-15 RX ORDER — CLOPIDOGREL BISULFATE 75 MG/1
75 TABLET ORAL DAILY
Status: DISCONTINUED | OUTPATIENT
Start: 2019-01-15 | End: 2019-01-15 | Stop reason: HOSPADM

## 2019-01-15 RX ORDER — ONDANSETRON 8 MG/1
8 TABLET, ORALLY DISINTEGRATING ORAL EVERY 8 HOURS PRN
Status: DISCONTINUED | OUTPATIENT
Start: 2019-01-15 | End: 2019-01-15 | Stop reason: HOSPADM

## 2019-01-15 RX ORDER — FUROSEMIDE 10 MG/ML
80 INJECTION INTRAMUSCULAR; INTRAVENOUS
Status: COMPLETED | OUTPATIENT
Start: 2019-01-15 | End: 2019-01-15

## 2019-01-15 RX ORDER — HYDROCODONE BITARTRATE AND ACETAMINOPHEN 5; 325 MG/1; MG/1
1 TABLET ORAL ONCE
Status: COMPLETED | OUTPATIENT
Start: 2019-01-15 | End: 2019-01-15

## 2019-01-15 RX ORDER — NITROGLYCERIN 0.4 MG/1
0.4 TABLET SUBLINGUAL
Status: DISCONTINUED | OUTPATIENT
Start: 2019-01-15 | End: 2019-01-15

## 2019-01-15 RX ORDER — ASPIRIN 81 MG/1
81 TABLET ORAL DAILY
Status: DISCONTINUED | OUTPATIENT
Start: 2019-01-15 | End: 2019-01-15 | Stop reason: HOSPADM

## 2019-01-15 RX ORDER — FUROSEMIDE 40 MG/1
40 TABLET ORAL 2 TIMES DAILY
Qty: 60 TABLET | Refills: 0 | Status: SHIPPED | OUTPATIENT
Start: 2019-01-15 | End: 2019-03-04 | Stop reason: SDUPTHER

## 2019-01-15 RX ORDER — IBUPROFEN 200 MG
24 TABLET ORAL
Status: DISCONTINUED | OUTPATIENT
Start: 2019-01-15 | End: 2019-01-15 | Stop reason: HOSPADM

## 2019-01-15 RX ORDER — GLUCAGON 1 MG
1 KIT INJECTION
Status: DISCONTINUED | OUTPATIENT
Start: 2019-01-15 | End: 2019-01-15 | Stop reason: HOSPADM

## 2019-01-15 RX ORDER — ACETAMINOPHEN 325 MG/1
650 TABLET ORAL EVERY 4 HOURS PRN
Status: DISCONTINUED | OUTPATIENT
Start: 2019-01-15 | End: 2019-01-15 | Stop reason: HOSPADM

## 2019-01-15 RX ORDER — ASPIRIN 325 MG
325 TABLET ORAL
Status: COMPLETED | OUTPATIENT
Start: 2019-01-15 | End: 2019-01-15

## 2019-01-15 RX ADMIN — HUMAN ALBUMIN MICROSPHERES AND PERFLUTREN 0.66 MG: 10; .22 INJECTION, SOLUTION INTRAVENOUS at 02:01

## 2019-01-15 RX ADMIN — NITROGLYCERIN 0.5 INCH: 20 OINTMENT TOPICAL at 02:01

## 2019-01-15 RX ADMIN — FUROSEMIDE 60 MG: 10 INJECTION, SOLUTION INTRAVENOUS at 09:01

## 2019-01-15 RX ADMIN — ASPIRIN 325 MG ORAL TABLET 325 MG: 325 PILL ORAL at 02:01

## 2019-01-15 RX ADMIN — HYDROCODONE BITARTRATE AND ACETAMINOPHEN 1 TABLET: 5; 325 TABLET ORAL at 05:01

## 2019-01-15 RX ADMIN — FUROSEMIDE 60 MG: 10 INJECTION, SOLUTION INTRAVENOUS at 06:01

## 2019-01-15 RX ADMIN — POTASSIUM BICARBONATE 50 MEQ: 25 TABLET, EFFERVESCENT ORAL at 05:01

## 2019-01-15 RX ADMIN — FUROSEMIDE 80 MG: 10 INJECTION, SOLUTION INTRAVENOUS at 02:01

## 2019-01-15 RX ADMIN — ENOXAPARIN SODIUM 40 MG: 100 INJECTION SUBCUTANEOUS at 04:01
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
## 2019-01-15 NOTE — PLAN OF CARE
01/15/19 1621   Final Note   Assessment Type Final Discharge Note   Anticipated Discharge Disposition Home

## 2019-01-15 NOTE — ED PROVIDER NOTES
Encounter Date: 1/14/2019    SCRIBE #1 NOTE: I, Tristan Pulido, am scribing for, and in the presence of,  Dr. Witt. I have scribed the following portions of the note - the Resident attestation.       History     Chief Complaint   Patient presents with    Chest Pain     midsternal constant CP and SOB for the past hour. baseline 2LNC, hx HF.      HPI  Review of patient's allergies indicates:   Allergen Reactions    Iodine containing multivitamin Swelling     itching    Keflex [cephalexin] Swelling     Eyes.  Tolerated multiple doses of zosyn and 1 dose of augmentin in 2015 and 2016, respectively    Peaches [peach (prunus persica)] Swelling     eyes    Shellfish containing products Swelling    Fig tree Swelling     itching    Tuberculin spenser test ppd Rash     Past Medical History:   Diagnosis Date    AICD (automatic cardioverter/defibrillator) present 12/13/2015      Chronic anticoagulation 5/5/2016    Chronic combined systolic and diastolic heart failure 11/26/2012    EF 10-20% on ECHO 2013    Clotting disorder     Coronary artery disease involving native coronary artery of native heart without angina pectoris 11/26/2012    Cath 10- Stents patent non-obstructive disease Cath 11-12015 non-obstructive disease     Diverticulosis of colon     DVT (deep venous thrombosis), unspecified laterality 11/12/2015    Essential hypertension 11/15/2015    Hyperlipidemia     Hypertensive heart disease with heart failure 5/5/2016    MI (myocardial infarction) 2009    x's 5    Nicotine abuse     Obesity 11/26/2012    Pulmonary embolism 2011    Renal disorder     LAVERNE    Stented coronary artery 11/26/2012    LAD stent placed 10/17/2007      Past Surgical History:   Procedure Laterality Date    APPENDECTOMY      BACK SURGERY      CARDIAC DEFIBRILLATOR PLACEMENT      CARDIAC SURGERY  2007    stent    CARPAL TUNNEL RELEASE Right 04/2018    Embolectomy Right 11/26/2017    Performed by Low MORALEZ  MD [facility]indy at [facility] OR 2ND FLR    HEART CATH-LEFT Left 2015    Performed by Britton Restrepo MD at [facility] CATH LAB    HEART CATH-RIGHT Right 7/10/2017    Performed by Edison Marshall MD at [facility] CATH LAB    IRRIGATION AND DEBRIDEMENT UPPER EXTREMITY - LEFT INDEX FINGER Left 2016    Performed by Cornell Miguel MD at [facility] OR 2ND FLR    r knee scope      RELEASE-CARPAL TUNNEL right, right thumb TFR Right 2018    Performed by [facility] at [facility] OR    RELEASE-FINGER-TRIGGER right thumb Right 2018    Performed by Barbara [facility]ata MD at [facility] OR    SPINE SURGERY      TONSILLECTOMY      TRIGGER FINGER RELEASE Right 2018    thumb     Family History   Problem Relation Age of Onset    Cancer Mother         colon cancer    Heart disease Mother     Diabetes Mother     Heart disease Father     Stroke Father     Colon polyps Father     Cancer Paternal Grandmother         leukemia    No Known Problems Sister     No Known Problems Daughter     No Known Problems Son     No Known Problems Sister     No Known Problems Son      Social History     Tobacco Use    Smoking status: Former Smoker     Packs/day: 1.00     Years: 45.00     Pack years: 45.00     Types: Cigarettes     Last attempt to quit: 2005     Years since quittin.0    Smokeless tobacco: Never Used    Tobacco comment: 1-1.5 ppd every day.   Substance Use Topics    Alcohol use: No     Frequency: Never    Drug use: No     Review of Systems    Physical Exam     Initial Vitals [19 2353]   BP Pulse Resp Temp SpO2   (!) 149/84 85 18 97.6 °F (36.4 °C) 95 %      MAP       --         Physical Exam    ED Course   Procedures  Labs Reviewed   CBC W/ AUTO DIFFERENTIAL - Abnormal; Notable for the following components:       Result Value    Hemoglobin 13.6 (*)     MCHC 31.4 (*)     RDW 14.6 (*)     All other components within normal limits   TROPONIN I - Abnormal; Notable for the following components:     "Troponin I 0.078 (*)     All other components within normal limits   B-TYPE NATRIURETIC PEPTIDE - Abnormal; Notable for the following components:     (*)     All other components within normal limits   COMPREHENSIVE METABOLIC PANEL - Abnormal; Notable for the following components:    Glucose 113 (*)     All other components within normal limits   PROTIME-INR   HEMOGLOBIN A1C          Imaging Results          X-Ray Chest AP Portable (Final result)  Result time 01/15/19 01:56:25    Final result by Jefe Reyes MD (01/15/19 01:56:25)                 Impression:      Cardiomegaly and mild edema, suggestive of CHF exacerbation.      Electronically signed by: Jefe Reyes MD  Date:    01/15/2019  Time:    01:56             Narrative:    EXAMINATION:  XR CHEST AP PORTABLE    CLINICAL HISTORY:  Provided history is "CHF;  ".    TECHNIQUE:  One view of the chest.    COMPARISON:  11/11/2018.    FINDINGS:  Exam is under penetrated.  Cardiac silhouette is enlarged but stable.  Left chest wall cardiac pacing device is present with transvenous leads overlying the heart.  Coarsened interstitial lung markings and mild bilateral airspace disease.  Left hemidiaphragm is obscured.  No pneumothorax.                              X-Rays:   Independently Interpreted Readings:   Chest X-Ray: Pacer box and wiring intact. Enlarged cardiac silhouette with bilateral increased lung markings consistent with pulmonary edema.                 Scribe Attestation:   Scribe #1: I performed the above scribed service and the documentation accurately describes the services I performed. I attest to the accuracy of the note.    Attending Attestation:   Physician Attestation Statement for Resident:  As the supervising MD   Physician Attestation Statement: I have personally seen and examined this patient.  . -: 67 y/o man with comorbidity of cardiomyopathy presents for evaluation of chest pain and SOB which began after exertion today.  "                      Clinical Impression:   {Add your Clinical Impression here. If you haven't documented one yet, please pend the note, finalize a Clinical Impression, and refresh your note before signing.:[id number]}

## 2019-01-15 NOTE — MEDICAL/APP STUDENT
"HISTORY & PHYSICAL    [facility]: [facility]    Patient Name: Audrey Oneil Jr.  YOB: 1950    Admit Date: 1/15/2019                   LOS: 0    PRESENTING HISTORY     Chief Complaint/Reason for Admission: Acute on chronic combined systolic and diastolic congestive heart failure    History of Present Illness:  Mr. Audrey Oneil Jr. is a 68 y.o. male with an extensive cardiac history who presented to the ED last night after complaining of substernal chest pain and abdominal tightness bilaterally. History of 5 MI (last was 2007 was given "Zol life vest", subsequently reports having 5 more MI on way back to hospital shortly after) and 1 PE during this time.  All treated at [facility].  Pt is unable to recall exact timeline.       He describes pain as similar to a previous MI.  Onset was after pushing sister's car. He put on 2L home oxygen and laid down.  Symptoms did not resolve.  When walking in to ED he had episode of pre-syncope and dizziness.  He was increased to 4L O2.  He also reports gagging during the episode similar to when he had PE in 2007 he describes as "not being able to get enough air".      Pt reports decreased exercise tolerance in the last 2 months.  At baseline he could walk 3 flights of stairs.  In the last couple months he is able to do 1 set of stairs due to dyspnea.  He complains of orthopnea and has to sleep on his side at night. Pt complains of dyspnea when bending over.  Denies PND.  Denies cough or sputum production.  Further denies nausea or vomiting.        He describes his symptoms as waxing and waning over last "couple of months".      He has a history of atrial fibrillation ("after Bibiana") currently controlled on amiodarone.  He is anticoagulated on Plavix and ASA.  Denies palpitations.   Complains of some dysphagia with pills and regurgitation.     He limits his water intake to 32 ounces a day.    He lives with his mother, sister, brother-in-law and niece.  He fishes, " [facility]         He was schedule to increased ICD to 4 chamber pacing.  This was cancelled due to gout.  He is overdue for an ICD battery check.  He has an appointment for follow up on 1/22.      CXR from this morning shows fluid in lungs ***.  Cardiac enzymes    Started on Lasix last night.  Nitro  Pt reports EKG was normal.      Dr. Edison Vallejo (?) is cardiologist.     Review of Systems:  General:  Denies weight loss, fever, chills, weakness. +fatigue  HENT:  Denies rhinorrhea, sore throat, congestion. +dysphaia  Eyes: Denies vision changes, red eyes  CVS:  Denies, pressure, palpitations, +CP  Resp:  Denies cough, sputum, +SOB  GI:  Denies diarrhea, nausea, vomiting. +constipation (Narco10 for back pain)  :  Denies dysuria, hematuria, nocturia  MSK:  Denies myalgias, arthralgias  Skin:  Denies rashes, bleeding, +dryness of nose and throat  Neuro:  Denies headache, numbness, paresthesias    PAST HISTORY:     Past Medical History:   Diagnosis Date    AICD (automatic cardioverter/defibrillator) present 12/13/2015      Chronic anticoagulation 5/5/2016    Chronic combined systolic and diastolic heart failure 11/26/2012    EF 10-20% on ECHO 2013    Clotting disorder     Coronary artery disease involving native coronary artery of native heart without angina pectoris 11/26/2012    Cath 10- Stents patent non-obstructive disease Cath 11-12015 non-obstructive disease     Diverticulosis of colon     DVT (deep venous thrombosis), unspecified laterality 11/12/2015    Essential hypertension 11/15/2015    Hyperlipidemia     Hypertensive heart disease with heart failure 5/5/2016    MI (myocardial infarction) 2009    x's 5    Nicotine abuse     Obesity 11/26/2012    Pulmonary embolism 2011    Renal disorder     LAVERNE    Stented coronary artery 11/26/2012    LAD stent placed 10/17/2007        Past Surgical History:   Procedure Laterality Date    APPENDECTOMY      BACK SURGERY      CARDIAC  DEFIBRILLATOR PLACEMENT      CARDIAC SURGERY  2007    stent    CARPAL TUNNEL RELEASE Right 2018    Embolectomy Right 2017    [facility] 2ND F[facility]    HEART CATH-LEFT Left 2015    [facility] by Britton [facility]repo [facility] at [facility] CATH LAB    HEART CATH-RIGHT Right 7/10/2017    Performed by Edison [facility] at [facility] CATH LAB    IRRIGATION AND DEBRIDEMENT UPPER EXTREMITY - LEFT INDEX FINGER Left 2016    Performed by Cornell Miguel [facility] at [facility] OR 2ND FLR    r knee scope      RELEASE-CARPAL TUNNEL right, right thumb TFR Right 2018    [facility]    RELEASE-FINGER-TRIGGER right thumb Right 2018    [facility] at [facility]    SPINE SURGERY      TONSILLECTOMY      TRIGGER FINGER RELEASE Right 2018    thumb       Family History   Problem Relation Age of Onset    Cancer Mother         colon cancer    Heart disease Mother     Diabetes Mother     Heart disease Father     Stroke Father     Colon polyps Father     Cancer Paternal Grandmother         leukemia    No Known Problems Sister     No Known Problems Daughter     No Known Problems Son     No Known Problems Sister     No Known Problems Son        Social History     Socioeconomic History    Marital status:      Spouse name: None    Number of children: 2    Years of education: None    Highest education level: None   Social Needs    Financial resource strain: None    Food insecurity - worry: None    Food insecurity - inability: None    Transportation needs - medical: None    Transportation needs - non-medical: None   Occupational History    Occupation: [employer]     Comment: unemployed   Tobacco Use    Smoking status: Former Smoker     Packs/day: 1.00     Years: 45.00     Pack years: 45.00     Types: Cigarettes     Last attempt to quit: 2005     Years since quittin.0    Smokeless tobacco: Never Used     Tobacco comment: 1-1.5 ppd every day.   Substance and Sexual Activity    Alcohol use: No     Frequency: Never    Drug use: No    Sexual activity: No   Other Topics Concern    None   Social History Narrative    None       MEDICATIONS & ALLERGIES:     No current facility-administered medications on file prior to encounter.      Current Outpatient Medications on File Prior to Encounter   Medication Sig    allopurinol (ZYLOPRIM) 100 MG tablet TAKE 1 TABLET(100 MG) BY MOUTH EVERY DAY    amiodarone (PACERONE) 200 MG Tab Take 1 tablet (200 mg total) by mouth once daily.    aspirin (ECOTRIN) 325 MG EC tablet Take 325 mg by mouth once daily.    atorvastatin (LIPITOR) 80 MG tablet TAKE 1 TABLET(80 MG) BY MOUTH EVERY DAY    clopidogrel (PLAVIX) 75 mg tablet Take 1 tablet (75 mg total) by mouth once daily.    colchicine (COLCRYS) 0.6 mg tablet TAKE 2 TABLETS BY MOUTH ON DAY 1, THEN TAKE 1 TABLET BY MOUTH EVERY DAY AS NEEDED THEREAFTER    diphenhydrAMINE (BENADRYL) 50 MG tablet Take 1 tab at 6 pm & 11 pm on night before your proc, then 1 tab at 6 am on day of proc    docusate sodium (COLACE) 50 MG capsule Take 1 capsule (50 mg total) by mouth 2 (two) times daily. (Patient taking differently: Take 50 mg by mouth 2 (two) times daily as needed. )    furosemide (LASIX) 40 MG tablet TAKE 1 TABLET(40 MG) BY MOUTH EVERY DAY    gabapentin (NEURONTIN) 100 MG capsule Take 1 capsule (100 mg total) by mouth 2 (two) times daily. for 5 days    HYDROcodone-acetaminophen (NORCO)  mg per tablet Take 1 tablet by mouth every 6 (six) hours as needed for Pain.    Lactobacillus rhamnosus GG (CULTURELLE) 10 billion cell capsule Take 1 capsule by mouth once daily.    lidocaine (LIDODERM) 5 % Place 1 patch onto the skin once daily. Remove & Discard patch within 12 hours or as directed by MD    metoprolol succinate (TOPROL-XL) 50 MG 24 hr tablet TAKE 1 TABLET BY MOUTH EVERY DAY    metoprolol succinate (TOPROL-XL) 50 MG 24 hr  tablet TAKE 1 TABLET BY MOUTH EVERY DAY    oxyCODONE-acetaminophen (PERCOCET)  mg per tablet Take 1 tablet by mouth every 4 (four) hours as needed for Pain.       Review of patient's allergies indicates:   Allergen Reactions    Iodine containing multivitamin Swelling     itching    Keflex [cephalexin] Swelling     Eyes.  Tolerated multiple doses of zosyn and 1 dose of augmentin in 2015 and 2016, respectively    Peaches [peach (prunus persica)] Swelling     eyes    Shellfish containing products Swelling    Fig tree Swelling     itching    Tuberculin spenser test ppd Rash       OBJECTIVE:     Vital Signs:  Vital Signs (Most Recent):  Temp: 97 °F (36.1 °C) (01/15/19 0500)  Pulse: 67 (01/15/19 0707)  Resp: 20 (01/15/19 0500)  BP: (!) 100/55 (01/15/19 0500)  SpO2: 97 % (01/15/19 0500) Vital Signs (24h Range):  Temp:  [97 °F (36.1 °C)-97.7 °F (36.5 °C)] 97 °F (36.1 °C)  Pulse:  [60-85] 67  Resp:  [18-28] 20  SpO2:  [93 %-97 %] 97 %  BP: (100-149)/(55-84) 100/55     Wt Readings from Last 1 Encounters:   01/15/19 0138 107.7 kg (237 lb 7 oz)   01/14/19 2353 103.9 kg (229 lb)     Body mass index is 37.19 kg/m².     Physical Exam:  General:  Well developed, well nourished, no acute distress  Head: Normocephalic, atraumatic  Eyes: PERRL, EOMI, clear sclera  Throat: No posterior pharyngeal erythema or exudate, no tonsillar exudate  Neck: supple, normal ROM, no thyromegaly   CVS:  RRR, S1 and S2 normal, no murmurs, rubs, gallops, 2+ peripheral pulses  Resp:  Soft crackles appreciated at the bases, no wheezes, rales, rhonchi, cough  GI:  Abdomen soft, non-tender, non-distended, normoactive bowel sounds  MSK:  No muscle atrophy, cyanosis, peripheral edema, full range of motion  Skin:  No rashes, ulcers, erythema  Neuro:  CNII-XII grossly intact  Psych:  Alert and oriented to person, place, and time    Laboratory  Recent Labs   Lab 01/15/19  0204   WBC 7.80   HGB 13.6*   HCT 43.3      MONO 8.7  0.7   EOSINOPHIL 1.9  "    Recent Labs   Lab 01/15/19  0247      K 4.1      CO2 24   BUN 22   CREATININE 1.2   *   CALCIUM 8.8   AST 34   ALT 40   ALKPHOS 107   BILITOT 0.5   PROT 7.2   ALBUMIN 3.7     Recent Labs   Lab 01/15/19  0204   INR 0.9   TROPONINI 0.078*   *     No results for input(s): PH, PCO2, PO2, HCO3 in the last 72 hours.  @HGBA1C:3)@  Lab Results   Component Value Date    HGBA1C 5.9 (H) 08/01/2018       Diagnostic Results:  {Results; Diagnostics:[id number]::"***"}        ASSESSMENT & PLAN:   Mr. Audrey Oneil Jr. is a 68 y.o. male    Current Problems List:  Active Hospital Problems    Diagnosis  POA    *Acute on chronic combined systolic and diastolic congestive heart failure [I50.43]  Unknown     Chronic     11/30/15 Echo:  1 - Eccentric LVH with severely depressed left ventricular systolic function (EF 15-20%).   2 - Mild left atrial enlargement.   3 - Left ventricular diastolic dysfunction.   4 - Normal right ventricular systolic function .       Elevated troponin [R74.8]  Yes    Idiopathic chronic gout of left foot without tophus [M1A.0720]  Yes    Hyperlipidemia LDL goal <70 [E78.5]  Yes    Essential hypertension [I10]  Yes    Hx pulmonary embolism 2007 (estimate) [Z86.711]  Yes    Coronary artery disease involving native coronary artery of native heart without angina pectoris [I25.10]  Yes     Cath 10- Stents patent non-obstructive disease  Cath 11-12015 non-obstructive disease        Resolved Hospital Problems   No resolved problems to display.       Problem Assessment & Treatment Plan:      Samantha Cook, MS3    "

## 2019-01-15 NOTE — SUBJECTIVE & OBJECTIVE
Past Medical History:   Diagnosis Date    AICD (automatic cardioverter/defibrillator) present 12/13/2015      Chronic anticoagulation 5/5/2016    Chronic combined systolic and diastolic heart failure 11/26/2012    EF 10-20% on ECHO 2013    Clotting disorder     Coronary artery disease involving native coronary artery of native heart without angina pectoris 11/26/2012    Cath 10- Stents patent non-obstructive disease Cath 11-12015 non-obstructive disease     Diverticulosis of colon     DVT (deep venous thrombosis), unspecified laterality 11/12/2015    Essential hypertension 11/15/2015    Hyperlipidemia     Hypertensive heart disease with heart failure 5/5/2016    MI (myocardial infarction) 2009    x's 5    Nicotine abuse     Obesity 11/26/2012    Pulmonary embolism 2011    Renal disorder     LAVERNE    Stented coronary artery 11/26/2012    LAD stent placed 10/17/2007        Past Surgical History:   Procedure Laterality Date    APPENDECTOMY      BACK SURGERY      CARDIAC DEFIBRILLATOR PLACEMENT      CARDIAC SURGERY  2007    stent    CARPAL TUNNEL RELEASE Right 04/2018    Embolectomy Right 11/26/2017    [facility] 2ND FLR    HEART CATH-LEFT Left 11/13/2015    [facility] by [facility]on [facility] at [facility] CATH LAB    HEART CATH-RIGHT Right 7/10/2017    Performed by Edison Marshall MD at [facility] CATH LAB    IRRIGATION AND DEBRIDEMENT UPPER EXTREMITY - LEFT INDEX FINGER Left 12/8/2016    Performed by Cornell Miguel [facility] at [facility] OR 2ND FLR    r knee scope      RELEASE-CARPAL TUNNEL right, right thumb TFR Right 4/6/2018    [facility] OR    RELEASE-FINGER-TRIGGER right thumb Right 4/6/2018    [facility] by [facility] at [facility] OR    SPINE SURGERY      TONSILLECTOMY      TRIGGER FINGER RELEASE Right 04/2018    thumb       Review of patient's allergies indicates:   Allergen Reactions    Iodine containing  multivitamin Swelling     itching    Keflex [cephalexin] Swelling     Eyes.  Tolerated multiple doses of zosyn and 1 dose of augmentin in 2015 and 2016, respectively    Peaches [peach (prunus persica)] Swelling     eyes    Shellfish containing products Swelling    Fig tree Swelling     itching    Tuberculin spenser test ppd Rash       No current facility-administered medications on file prior to encounter.      Current Outpatient Medications on File Prior to Encounter   Medication Sig    allopurinol (ZYLOPRIM) 100 MG tablet TAKE 1 TABLET(100 MG) BY MOUTH EVERY DAY    amiodarone (PACERONE) 200 MG Tab Take 1 tablet (200 mg total) by mouth once daily.    aspirin (ECOTRIN) 325 MG EC tablet Take 325 mg by mouth once daily.    atorvastatin (LIPITOR) 80 MG tablet TAKE 1 TABLET(80 MG) BY MOUTH EVERY DAY    clopidogrel (PLAVIX) 75 mg tablet Take 1 tablet (75 mg total) by mouth once daily.    colchicine (COLCRYS) 0.6 mg tablet TAKE 2 TABLETS BY MOUTH ON DAY 1, THEN TAKE 1 TABLET BY MOUTH EVERY DAY AS NEEDED THEREAFTER    diphenhydrAMINE (BENADRYL) 50 MG tablet Take 1 tab at 6 pm & 11 pm on night before your proc, then 1 tab at 6 am on day of proc    docusate sodium (COLACE) 50 MG capsule Take 1 capsule (50 mg total) by mouth 2 (two) times daily. (Patient taking differently: Take 50 mg by mouth 2 (two) times daily as needed. )    furosemide (LASIX) 40 MG tablet TAKE 1 TABLET(40 MG) BY MOUTH EVERY DAY    gabapentin (NEURONTIN) 100 MG capsule Take 1 capsule (100 mg total) by mouth 2 (two) times daily. for 5 days    HYDROcodone-acetaminophen (NORCO)  mg per tablet Take 1 tablet by mouth every 6 (six) hours as needed for Pain.    Lactobacillus rhamnosus GG (CULTURELLE) 10 billion cell capsule Take 1 capsule by mouth once daily.    lidocaine (LIDODERM) 5 % Place 1 patch onto the skin once daily. Remove & Discard patch within 12 hours or as directed by MD    metoprolol succinate (TOPROL-XL) 50 MG 24 hr tablet  TAKE 1 TABLET BY MOUTH EVERY DAY    metoprolol succinate (TOPROL-XL) 50 MG 24 hr tablet TAKE 1 TABLET BY MOUTH EVERY DAY    oxyCODONE-acetaminophen (PERCOCET)  mg per tablet Take 1 tablet by mouth every 4 (four) hours as needed for Pain.     Family History     Problem Relation (Age of Onset)    Cancer Mother, Paternal Grandmother    Colon polyps Father    Diabetes Mother    Heart disease Mother, Father    No Known Problems Sister, Daughter, Son, Sister, Son    Stroke Father        Tobacco Use    Smoking status: Former Smoker     Packs/day: 1.00     Years: 45.00     Pack years: 45.00     Types: Cigarettes     Last attempt to quit: 2005     Years since quittin.0    Smokeless tobacco: Never Used    Tobacco comment: 1-1.5 ppd every day.   Substance and Sexual Activity    Alcohol use: No     Frequency: Never    Drug use: No    Sexual activity: No     Review of Systems   Constitutional: Negative for chills and fever.   HENT: Negative for rhinorrhea and sore throat.    Eyes: Negative for pain and redness.   Respiratory: Positive for shortness of breath. Negative for cough.    Cardiovascular: Negative for chest pain, palpitations and leg swelling.   Gastrointestinal: Negative for abdominal pain, diarrhea, nausea and vomiting.   Endocrine: Negative for polydipsia and polyuria.   Genitourinary: Negative for dysuria and hematuria.   Musculoskeletal: Negative for back pain and neck pain.   Skin: Negative for color change and rash.   Neurological: Negative for light-headedness and headaches.   Hematological: Negative for adenopathy. Does not bruise/bleed easily.   Psychiatric/Behavioral: Negative for confusion. The patient is not nervous/anxious.      Objective:     Vital Signs (Most Recent):  Temp: 97.7 °F (36.5 °C) (01/15/19 0215)  Pulse: 71 (01/15/19 0215)  Resp: (!) 22 (01/15/19 0215)  BP: 135/78 (01/15/19 0215)  SpO2: (!) 94 % (01/15/19 0215) Vital Signs (24h Range):  Temp:  [97.6 °F (36.4 °C)-97.7  °F (36.5 °C)] 97.7 °F (36.5 °C)  Pulse:  [71-85] 71  Resp:  [18-22] 22  SpO2:  [94 %-95 %] 94 %  BP: (135-149)/(78-84) 135/78     Weight: 107.7 kg (237 lb 7 oz)  Body mass index is 37.19 kg/m².    Physical Exam   Constitutional: He is oriented to person, place, and time. He appears well-developed and well-nourished. No distress.   HENT:   Head: Normocephalic and atraumatic.   Mouth/Throat: Oropharynx is clear and moist.   Eyes: Conjunctivae and EOM are normal. No scleral icterus.   Neck: Normal range of motion. Neck supple.   Cardiovascular: Normal rate, regular rhythm and normal heart sounds. Exam reveals no gallop and no friction rub.   No murmur heard.  Distant heart sounds   Pulmonary/Chest: Effort normal. No respiratory distress. He has no wheezes. He has rales (bibasilar).   Abdominal: Soft. Bowel sounds are normal. He exhibits no distension. There is no tenderness. There is no guarding.   Musculoskeletal: Normal range of motion. He exhibits no edema.   Lymphadenopathy:     He has no cervical adenopathy.   Neurological: He is alert and oriented to person, place, and time.   Skin: Skin is warm and dry. No rash noted. He is not diaphoretic.   Psychiatric: He has a normal mood and affect. His behavior is normal.         CRANIAL NERVES     CN III, IV, VI   Extraocular motions are normal.        Significant Labs:   CBC:   Recent Labs   Lab 01/15/19  0204   WBC 7.80   HGB 13.6*   HCT 43.3        CMP:   Recent Labs   Lab 01/15/19  0247      K 4.1      CO2 24   *   BUN 22   CREATININE 1.2   CALCIUM 8.8   PROT 7.2   ALBUMIN 3.7   BILITOT 0.5   ALKPHOS 107   AST 34   ALT 40   ANIONGAP 9   EGFRNONAA >60.0       Significant Imaging: I have reviewed all pertinent imaging results/findings within the past 24 hours.

## 2019-01-15 NOTE — PROVIDER PROGRESS NOTES - EMERGENCY DEPT.
Encounter Date: 1/14/2019    ED Physician Progress Notes         EKG - STEMI Decision  Initial Reading: No STEMI present.  Response: No Action Needed.

## 2019-01-15 NOTE — NURSING
Pt arrived from ED in wheelchair, able to ambulate to bed without difficulty, 4l/nc sats 97%, [id number] needs at this time, will cont to monitor pt

## 2019-01-15 NOTE — ED PROVIDER NOTES
Encounter Date: 1/14/2019       History     Chief Complaint   Patient presents with    Chest Pain     midsternal constant CP and SOB for the past hour. baseline 2LNC, hx HF.      68 year old  man with CAD and history of several myocardial infarctions status post AICD placement, history of DVT/PE, and chronic CHF with baseline EF of 15-20% presenting for acute onset of SOB and mid-epigastric abdominal pain that began a few hours prior to arrival. Pt reports that prior to his symptoms onset, he was helping his sister push her broken down car for a fairly significant distance. His symptoms began shortly after returning home, worsening to the point of feeling like he was going to pass out. Patient attempted to use his 2 L NC home O2 to obtain relief but notes that the SOB persisted and he decided to come into the ED. Pt denies fever, chills, headache, nausea/vomiting, chest pain, diaphoresis, weakness/numbness in extremities.           Review of patient's allergies indicates:   Allergen Reactions    Iodine containing multivitamin Swelling     itching    Keflex [cephalexin] Swelling     Eyes.  Tolerated multiple doses of zosyn and 1 dose of augmentin in 2015 and 2016, respectively    Peaches [peach (prunus persica)] Swelling     eyes    Shellfish containing products Swelling    Fig tree Swelling     itching    Tuberculin spenser test ppd Rash     Past Medical History:   Diagnosis Date    AICD (automatic cardioverter/defibrillator) present 12/13/2015      Chronic anticoagulation 5/5/2016    Chronic combined systolic and diastolic heart failure 11/26/2012    EF 10-20% on ECHO 2013    Clotting disorder     Coronary artery disease involving native coronary artery of native heart without angina pectoris 11/26/2012    Cath 10- Stents patent non-obstructive disease Cath 11-12015 non-obstructive disease     Diverticulosis of colon     DVT (deep venous thrombosis), unspecified laterality  2015    Essential hypertension 11/15/2015    Hyperlipidemia     Hypertensive heart disease with heart failure 2016    MI (myocardial infarction) 2009    x's 5    Nicotine abuse     Obesity 2012    Pulmonary embolism     Renal disorder     LAVERNE    Stented coronary artery 2012    LAD stent placed 10/17/2007      Past Surgical History:   Procedure Laterality Date    APPENDECTOMY      BACK SURGERY      CARDIAC DEFIBRILLATOR PLACEMENT      CARDIAC SURGERY  2007    stent    CARPAL TUNNEL RELEASE Right 2018    Embolectomy Right 2017    Performed [facility] Low [facility] at [facility] OR 2ND FLR    HEART CATH-LEFT Left 2015    Performed by Britton Restrepo [facility] at [facility] CATH LAB    HEART CATH-RIGHT Right 7/10/2017    Performed by Edison Marshall MD at [facility] CATH LAB    IRRIGATION AND DEBRIDEMENT UPPER EXTREMITY - LEFT INDEX FINGER Left 2016    Performed by Cornell Miguel MD at [facility] OR 2ND FLR    r knee scope      RELEASE-CARPAL TUNNEL right, right thumb TFR Right 2018    [facility] at [facility] OR    RELEASE-FINGER-TRIGGER right thumb Right 2018    Performed [facility] at [facility] OR    SPINE SURGERY      TONSILLECTOMY      TRIGGER FINGER RELEASE Right 2018    thumb     Family History   Problem Relation Age of Onset    Cancer Mother         colon cancer    Heart disease Mother     Diabetes Mother     Heart disease Father     Stroke Father     Colon polyps Father     Cancer Paternal Grandmother         leukemia    No Known Problems Sister     No Known Problems Daughter     No Known Problems Son     No Known Problems Sister     No Known Problems Son      Social History     Tobacco Use    Smoking status: Former Smoker     Packs/day: 1.00     Years: 45.00     Pack years: 45.00     Types: Cigarettes     Last attempt to quit: 2005     Years since quittin.0    Smokeless tobacco: Never Used     Tobacco comment: 1-1.5 ppd every day.   Substance Use Topics    Alcohol use: No     Frequency: Never    Drug use: No     Review of Systems   Constitutional: Negative for chills, diaphoresis and fever.   HENT: Negative for congestion and rhinorrhea.    Eyes: Negative for pain.   Respiratory: Positive for shortness of breath. Negative for cough and wheezing.    Cardiovascular: Negative for chest pain and leg swelling.   Gastrointestinal: Positive for abdominal pain. Negative for abdominal distention, constipation, diarrhea, nausea and vomiting.   Genitourinary: Negative for dysuria.   Musculoskeletal: Negative for arthralgias and myalgias.   Neurological: Negative for dizziness, light-headedness, numbness and headaches.   Psychiatric/Behavioral: Negative for agitation and confusion.       Physical Exam     Initial Vitals [01/14/19 2353]   BP Pulse Resp Temp SpO2   (!) 149/84 85 18 97.6 °F (36.4 °C) 95 %      MAP       --         Physical Exam    Constitutional: He appears well-developed and well-nourished. He is not diaphoretic. No distress.   HENT:   Head: Normocephalic and atraumatic.   Eyes: EOM are normal. Pupils are equal, round, and reactive to light.   Neck: Normal range of motion. Neck supple. No JVD present.   Cardiovascular: Normal rate, regular rhythm, normal heart sounds and intact distal pulses.   No murmur heard.  Pulmonary/Chest: Breath sounds normal. No respiratory distress. He has no wheezes.   Abdominal: Soft. Bowel sounds are normal. He exhibits no distension.   Musculoskeletal: Normal range of motion.   Neurological: He is alert and oriented to person, place, and time. He has normal strength.   Skin: Skin is warm and dry.   Psychiatric: He has a normal mood and affect.         ED Course   Procedures  Labs Reviewed   CBC W/ AUTO DIFFERENTIAL - Abnormal; Notable for the following components:       Result Value    Hemoglobin 13.6 (*)     MCHC 31.4 (*)     RDW 14.6 (*)     All other components within  "normal limits   TROPONIN I - Abnormal; Notable for the following components:    Troponin I 0.078 (*)     All other components within normal limits   B-TYPE NATRIURETIC PEPTIDE - Abnormal; Notable for the following components:     (*)     All other components within normal limits   COMPREHENSIVE METABOLIC PANEL - Abnormal; Notable for the following components:    Glucose 113 (*)     All other components within normal limits   PROTIME-INR     EKG Readings: (Independently Interpreted)   Initial Reading: No STEMI. Rhythm: Normal Sinus Rhythm. Conduction: borerline 1st degree AV block. Axis: Left Axis Deviation. Other Impression: borderline 1st degree AV block       Imaging Results          X-Ray Chest AP Portable (Final result)  Result time 01/15/19 01:56:25    Final result by Jefe Reyes MD (01/15/19 01:56:25)                 Impression:      Cardiomegaly and mild edema, suggestive of CHF exacerbation.      Electronically signed by: Jefe Reyes MD  Date:    01/15/2019  Time:    01:56             Narrative:    EXAMINATION:  XR CHEST AP PORTABLE    CLINICAL HISTORY:  Provided history is "CHF;  ".    TECHNIQUE:  One view of the chest.    COMPARISON:  11/11/2018.    FINDINGS:  Exam is under penetrated.  Cardiac silhouette is enlarged but stable.  Left chest wall cardiac pacing device is present with transvenous leads overlying the heart.  Coarsened interstitial lung markings and mild bilateral airspace disease.  Left hemidiaphragm is obscured.  No pneumothorax.                                 Medical Decision Making:   History:   Old Medical Records: I decided to obtain old medical records.  Old Records Summarized: records from previous admission(s).  Initial Assessment:   69 y/o male with significant cardiac history including multiple prior myocardial infarctions and reduced EF 15-20% requiring AICD placement, piror PE on chronic anticoagulation w/ Plavix, CAD, who is presenting with worsening shortness " of breath for several hours prior to arrival to ED. My differential diagnosis includes but is not limited to unstable angina, NSTEMI, CHF exacerbation, PE. Most likely unstable angina given the fact that pt's symptoms began after exerting himself by the way of pushing a car and were not resolved with rest or typical oxygen requirements. EKG not suggestive of ischemic cardiac etiology of pt's sxs. Will order basic labs (CBC, CMP, troponin, BNP) to further assess for ischemic vs. non--ischemic cause.  Will give sublingual nitroglycerin to attempt to resolve substernal CP.   Differential Diagnosis:    unstable angina, NSTEMI, CHF exacerbation, PE  Clinical Tests:   Lab Tests: Ordered and Reviewed  The following lab test(s) were unremarkable: CBC, CMP, Troponin and BNP       <> Summary of Lab: CBC, CMP unremarkable for acute abnormality  Mildly elevated troponin of 0.079  BNP is improved when compared to prior values  Radiological Study: Ordered and Reviewed  Medical Tests: Ordered and Reviewed       APC / Resident Notes:   1:52 AM:  CXR obtained showing cardiomegaly and mild edema, suggestive of CHF exacerbation.     2:04 AM:  Troponin #1 mildly elevated at 0.078, will continue to trend q3h. This rise in troponin is likely due to an element of demand ischemia, but could also represent an underlying inferior ischemia.  SL nitroglycerin administered x1 -> provided relief to pt's substernal pain.     2:30:  Patient continues to be hemodynamically stable, saturating 94% on 2 L NC. Pt will be admitted to observation for continued trending of elevated troponin and further management of acute on chronic combined systolic and diastolic congestive heart failure.          Attending Attestation:   Physician Attestation Statement for Resident:  As the supervising MD   Physician Attestation Statement: I have personally seen and examined this patient.   I agree with the above history. -: 68-year-old man with comorbidities of severe  cardiomyopathy (EF approximately 15%) presents for evaluation of chest pain and shortness of breath after pushing a vehicle earlier today.   As the supervising MD I agree with the above PE.    As the supervising MD I agree with the above treatment, course, plan, and disposition.  I have reviewed and agree with the residents interpretation of the following: lab data, x-rays and EKG.  I have reviewed the following: old records at this facility.                       Clinical Impression:   The primary encounter diagnosis was Acute on chronic combined systolic and diastolic congestive heart failure. Diagnoses of Chest pain, Shortness of breath, Acute on chronic combined systolic and diastolic heart failure, and VT (ventricular tachycardia) were also pertinent to this visit.      Disposition:   Disposition: Placed in Observation  Condition: Fair                        Washington Gamboa MD  Resident  01/15/19 0428       Darius Witt MD  01/16/19 0221

## 2019-01-15 NOTE — PLAN OF CARE
Patient currently has an O2 tank that he borrowed from a friend and uses prn SOB. Patient has never been prescribed home O2 by medical provider. Will need home O2 qualifying test if home O2 is recommended. Will follow.     01/15/19 1229   Discharge Assessment   Assessment Type Discharge Planning Assessment   Confirmed/corrected address and phone number on facesheet? Yes   Assessment information obtained from? Patient;Medical Record   Expected Length of Stay (days) 1   Communicated expected length of stay with patient/caregiver yes   Prior to hospitilization cognitive status: Alert/Oriented   Prior to hospitalization functional status: Independent   Current cognitive status: Alert/Oriented   Current Functional Status: Independent   Lives With parent(s);sibling(s);other relative(s)  (mother, sister, CRISTIANE, niece)   Able to Return to Prior Arrangements yes   Is patient able to care for self after discharge? Yes   Who are your caregiver(s) and their phone number(s)? self care   Patient's perception of discharge disposition home or selfcare   Readmission Within the Last 30 Days no previous admission in last 30 days   Patient currently being followed by outpatient case management? No   Patient currently receives any other outside agency services? No   Equipment Currently Used at Home oxygen   Do you have any problems affording any of your prescribed medications? No   Is the patient taking medications as prescribed? yes   Does the patient have transportation home? Yes   Transportation Anticipated family or friend will provide   Does the patient receive services at the [facility]? No   Discharge Plan A Home with family   Discharge Plan B Home Health   DME Needed Upon Discharge  oxygen   Patient/Family in Agreement with Plan yes

## 2019-01-15 NOTE — ASSESSMENT & PLAN NOTE
"CORONARY ARTERY DISEASE  HYPERTENSION  HYPERLIPIDEMIA    Echo April 2018: EF "teens to 20s" with grade 1 diastolic dysfunction  BNP: 199  CXR: cardiomegaly and mild edema    Home regimen: metoprolol succinate 50mg daily, atorvastatin 80mg daily, asa 325mg daily, clopidogrel 75mg daily, furosemide 40mg daily, amiodarone 200mg daily (for history of VT, s/p ICD)    Uncertain cause of current heart failure exacerbation- possibly dietary indiscretions, slow accumulation of fluid worsened by physical exertion today. Reports medication compliance. NSTEMI also considered, however no changes on EKG and troponin only minimally elevated. Will trend troponins as below.    - IV lasix 60mg bid  - ordered 2D echo  - strict I/O  - cardiac diet  - continue home medications (changed asa dose to low-dose)  - considered PE as cause of hypoxia, however Well's score for PE is 1.5 points, with only 1.3% chance of PE  - patient is not on optimal GDMT (had been on lisinopril previously but quit for unknown reason)- would recommend starting lisinopril prior to discharge. Patient is currently being evaluated for CRT with known LBBB  "

## 2019-01-15 NOTE — PROGRESS NOTES
Consent obtained. optison given ivp via left wrist sl for imaging. Denies transfusion rxn. Tolerated well. Sl flushed before and after with 10 cc ns.

## 2019-01-15 NOTE — ASSESSMENT & PLAN NOTE
Troponin 0.078 (previous baseline ~0.04)  EKG: no ST segment changes. LBBB, seen on previous EKG    - suspect demand ischemia due to CHF, however will trend troponins and start ACS protocol if increases significantly  - no ST changes on EKG. LBBB present but was seen on previous EKGs

## 2019-01-15 NOTE — DISCHARGE INSTRUCTIONS
1. Prescriptions were sent to your pharmacy, this is only temporary, so have your PCP and Cardiologist review your medications soon

## 2019-01-16 NOTE — PLAN OF CARE
Problem: Adult Inpatient Plan of Care  Goal: Plan of Care Review  Outcome: Ongoing (interventions implemented as appropriate)  Pt aox4 throughout shift, up to bathroom independently, lasix given BID as ordered. Echo completed today. Pt complaining of chronic posterior neck pain, norco given. Potassium replaced with PO K for K of 3.4. Plan is for discharge this evening.
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
## 2019-01-22 ENCOUNTER — CLINICAL SUPPORT (OUTPATIENT)
Dept: CARDIOLOGY | Facility: HOSPITAL | Age: 69
End: 2019-01-22
Attending: INTERNAL MEDICINE
Payer: MEDICARE

## 2019-01-22 DIAGNOSIS — I25.5 ISCHEMIC CARDIOMYOPATHY: ICD-10-CM

## 2019-01-22 DIAGNOSIS — Z95.810 AICD (AUTOMATIC CARDIOVERTER/DEFIBRILLATOR) PRESENT: ICD-10-CM

## 2019-01-22 PROCEDURE — 93296 REM INTERROG EVL PM/IDS: CPT

## 2019-01-24 DIAGNOSIS — Z95.810 CARDIAC DEFIBRILLATOR IN PLACE: Primary | ICD-10-CM

## 2019-01-25 ENCOUNTER — LAB VISIT (OUTPATIENT)
Dept: LAB | Facility: HOSPITAL | Age: 69
End: 2019-01-25
Attending: INTERNAL MEDICINE
Payer: MEDICARE

## 2019-01-25 ENCOUNTER — OFFICE VISIT (OUTPATIENT)
Dept: TRANSPLANT | Facility: CLINIC | Age: 69
End: 2019-01-25
Payer: MEDICARE

## 2019-01-25 VITALS
WEIGHT: 234.56 LBS | DIASTOLIC BLOOD PRESSURE: 68 MMHG | SYSTOLIC BLOOD PRESSURE: 116 MMHG | HEART RATE: 61 BPM | HEIGHT: 67 IN | BODY MASS INDEX: 36.81 KG/M2

## 2019-01-25 DIAGNOSIS — Z95.810 ICD (IMPLANTABLE CARDIOVERTER-DEFIBRILLATOR) IN PLACE: ICD-10-CM

## 2019-01-25 DIAGNOSIS — I50.22 CHRONIC SYSTOLIC CONGESTIVE HEART FAILURE: Primary | ICD-10-CM

## 2019-01-25 DIAGNOSIS — I10 ESSENTIAL HYPERTENSION: ICD-10-CM

## 2019-01-25 DIAGNOSIS — I25.5 CARDIOMYOPATHY, ISCHEMIC: Chronic | ICD-10-CM

## 2019-01-25 DIAGNOSIS — I25.10 CORONARY ARTERY DISEASE INVOLVING NATIVE CORONARY ARTERY OF NATIVE HEART WITHOUT ANGINA PECTORIS: ICD-10-CM

## 2019-01-25 DIAGNOSIS — I50.42 CHRONIC COMBINED SYSTOLIC AND DIASTOLIC HEART FAILURE: Chronic | ICD-10-CM

## 2019-01-25 DIAGNOSIS — N18.30 CKD (CHRONIC KIDNEY DISEASE), STAGE III: ICD-10-CM

## 2019-01-25 LAB
ALBUMIN SERPL BCP-MCNC: 3.7 G/DL
ALP SERPL-CCNC: 98 U/L
ALT SERPL W/O P-5'-P-CCNC: 34 U/L
ANION GAP SERPL CALC-SCNC: 9 MMOL/L
AST SERPL-CCNC: 25 U/L
BILIRUB SERPL-MCNC: 0.6 MG/DL
BNP SERPL-MCNC: 308 PG/ML
BUN SERPL-MCNC: 19 MG/DL
CALCIUM SERPL-MCNC: 9.5 MG/DL
CHLORIDE SERPL-SCNC: 106 MMOL/L
CO2 SERPL-SCNC: 27 MMOL/L
CREAT SERPL-MCNC: 0.9 MG/DL
EST. GFR  (AFRICAN AMERICAN): >60 ML/MIN/1.73 M^2
EST. GFR  (NON AFRICAN AMERICAN): >60 ML/MIN/1.73 M^2
GLUCOSE SERPL-MCNC: 100 MG/DL
POTASSIUM SERPL-SCNC: 3.7 MMOL/L
PROT SERPL-MCNC: 6.7 G/DL
SODIUM SERPL-SCNC: 142 MMOL/L

## 2019-01-25 PROCEDURE — 83880 ASSAY OF NATRIURETIC PEPTIDE: CPT

## 2019-01-25 PROCEDURE — 99499 RISK ADDL DX/OHS AUDIT: ICD-10-PCS | Mod: S$GLB,,, | Performed by: INTERNAL MEDICINE

## 2019-01-25 PROCEDURE — 3078F PR MOST RECENT DIASTOLIC BLOOD PRESSURE < 80 MM HG: ICD-10-PCS | Mod: CPTII,S$GLB,, | Performed by: INTERNAL MEDICINE

## 2019-01-25 PROCEDURE — 99999 PR PBB SHADOW E&M-EST. PATIENT-LVL III: CPT | Mod: PBBFAC,,, | Performed by: INTERNAL MEDICINE

## 2019-01-25 PROCEDURE — 3078F DIAST BP <80 MM HG: CPT | Mod: CPTII,S$GLB,, | Performed by: INTERNAL MEDICINE

## 2019-01-25 PROCEDURE — 99999 PR PBB SHADOW E&M-EST. PATIENT-LVL III: ICD-10-PCS | Mod: PBBFAC,,, | Performed by: INTERNAL MEDICINE

## 2019-01-25 PROCEDURE — 99214 PR OFFICE/OUTPT VISIT, EST, LEVL IV, 30-39 MIN: ICD-10-PCS | Mod: S$GLB,,, | Performed by: INTERNAL MEDICINE

## 2019-01-25 PROCEDURE — 99499 UNLISTED E&M SERVICE: CPT | Mod: S$GLB,,, | Performed by: INTERNAL MEDICINE

## 2019-01-25 PROCEDURE — 3074F SYST BP LT 130 MM HG: CPT | Mod: CPTII,S$GLB,, | Performed by: INTERNAL MEDICINE

## 2019-01-25 PROCEDURE — 3074F PR MOST RECENT SYSTOLIC BLOOD PRESSURE < 130 MM HG: ICD-10-PCS | Mod: CPTII,S$GLB,, | Performed by: INTERNAL MEDICINE

## 2019-01-25 PROCEDURE — 80053 COMPREHEN METABOLIC PANEL: CPT

## 2019-01-25 PROCEDURE — 1101F PR PT FALLS ASSESS DOC 0-1 FALLS W/OUT INJ PAST YR: ICD-10-PCS | Mod: CPTII,S$GLB,, | Performed by: INTERNAL MEDICINE

## 2019-01-25 PROCEDURE — 36415 COLL VENOUS BLD VENIPUNCTURE: CPT

## 2019-01-25 PROCEDURE — 1101F PT FALLS ASSESS-DOCD LE1/YR: CPT | Mod: CPTII,S$GLB,, | Performed by: INTERNAL MEDICINE

## 2019-01-25 PROCEDURE — 99214 OFFICE O/P EST MOD 30 MIN: CPT | Mod: S$GLB,,, | Performed by: INTERNAL MEDICINE

## 2019-01-25 NOTE — LETTER
January 25, 2019        Elvin Simms Jr.  [address]  [place] [address]  Phone: [phone]  Fax: [phone]             [facility]  [address]  [facility] [address]  Phone: [phone]   Patient: Audrey Oneil Jr.   MR Number: [id number]   YOB: 1950   Date of Visit: 1/25/2019       Dear Dr. Elvin Simms Jr.    Thank you for referring Audrey Oneil to me for evaluation. Attached you will find relevant portions of my assessment and plan of care.    If you have questions, please do not hesitate to call me. I look forward to following Audrey Oneil along with you.    Sincerely,    Edison Marshall MD    Enclosure    If you would like to receive this communication electronically, please contact [email or web address] or [phone] to request [employer] access.    [employer] is a tool which provides read-only access to select patient information with whom you have a relationship. Its easy to use and provides real time access to review your patients record including encounter summaries, notes, results, and demographic information.    If you feel you have received this communication in error or would no longer like to receive these types of communications, please e-mail [email or web address]

## 2019-01-25 NOTE — PROGRESS NOTES
Subjective:     HPI:  Mr. Oneil is a very pleasant 67 y.o. male with a hx of CAD s/p STEMI (s/p PCI LAD 2007), CHF/ICM and remote MI, quit smoking Dec 2015,  PE (2010, s/p 1-yr coumadin), HTN, DLP and s/p ICD [facility] who comes for a regular follow-up. Clinically reports NYHA class III symptoms. Occasional PND and orthopnea. Was scheduled to have a  BIV upgrade with Dr. Elkins however he was admitted  For ADHF. Diuresed well. He has not had a chance to call DR. Elkins's office to reschedule his procedure. He is not on ACEI or aldactone due to severe hyperkalemia in the past when he was on these agents. Labs were reviewed.     Past Medical History:   Diagnosis Date    AICD (automatic cardioverter/defibrillator) present 12/13/2015      Chronic anticoagulation 5/5/2016    Chronic combined systolic and diastolic heart failure 11/26/2012    EF 10-20% on ECHO 2013    Clotting disorder     Coronary artery disease involving native coronary artery of native heart without angina pectoris 11/26/2012    Cath 10- Stents patent non-obstructive disease Cath 11-12015 non-obstructive disease     Diverticulosis of colon     DVT (deep venous thrombosis), unspecified laterality 11/12/2015    Essential hypertension 11/15/2015    Hyperlipidemia     Hypertensive heart disease with heart failure 5/5/2016    MI (myocardial infarction) 2009    x's 5    Nicotine abuse     Obesity 11/26/2012    Pulmonary embolism 2011    Renal disorder     [place]VER[place]    Stented coronary artery 11/26/2012    LAD stent placed 10/17/2007      Past Surgical History:   Procedure Laterality Date    APPENDECTOMY      BACK SURGERY      CARDIAC DEFIBRILLATOR PLACEMENT      CARDIAC SURGERY  2007    stent    CARPAL TUNNEL RELEASE Right 04/2018    Embolectomy Right 11/26/2017    Performed [facility] Low [facility] 2ND FLR    HEART CATH-LEFT Left 11/13/2015    Performed by Britton Restrepo [facility] at [facility] CATH LAB    HEART  "CATH-RIGHT Right 7/10/2017    Performed by Edison Marshall [facility] at [facility] CATH LAB    IRRIGATION AND DEBRIDEMENT UPPER EXTREMITY - LEFT INDEX FINGER Left 12/8/2016    Performed by Cornell Miguel [facility] at [facility] OR 2ND FLR    r knee scope      RELEASE-CARPAL TUNNEL right, right thumb TFR Right 4/6/2018    [facility] at [facility] OR    RELEASE-FINGER-TRIGGER right thumb Right 4/6/2018    [facility] at [facility] OR    SPINE SURGERY      TONSILLECTOMY      TRIGGER FINGER RELEASE Right 04/2018    thumb       Review of Systems   Constitution: Negative. Negative for chills, decreased appetite, diaphoresis, fever, weakness, malaise/fatigue, night sweats, weight gain and weight loss.   Eyes: Negative.    Cardiovascular: Positive for dyspnea on exertion and leg swelling. Negative for chest pain, claudication, cyanosis, irregular heartbeat, near-syncope, orthopnea, palpitations, paroxysmal nocturnal dyspnea and syncope.   Respiratory: Negative for cough, hemoptysis and shortness of breath.    Endocrine: Negative.    Hematologic/Lymphatic: Negative.    Skin: Negative for color change, dry skin and nail changes.   Musculoskeletal: Negative.    Gastrointestinal: Negative.    Genitourinary: Negative.        Objective:   Blood pressure 116/68, pulse 61, height 5' 7" (1.702 m), weight 106.4 kg (234 lb 9.1 oz).body mass index is 36.74 kg/m².  Physical Exam   Constitutional: He appears well-developed.   /68 (BP Location: Right arm, Patient Position: Sitting, BP Method: Medium (Automatic))   Pulse 61   Ht 5' 7" (1.702 m)   Wt 106.4 kg (234 lb 9.1 oz)   BMI 36.74 kg/m²      HENT:   Head: Normocephalic.   Neck: No JVD present. Carotid bruit is not present.   Cardiovascular: Regular rhythm and normal heart sounds. PMI is displaced.   No murmur heard.  Pulmonary/Chest: Effort normal and breath sounds normal. No respiratory distress. He has no wheezes. He has no rales.   Abdominal: Soft. " Bowel sounds are normal. He exhibits distension. There is no tenderness. There is no rebound.   Musculoskeletal: He exhibits edema.   Neurological: He is alert.   Skin: Skin is warm.   Vitals reviewed.    Labs:    Chemistry        Component Value Date/Time     01/15/2019 1752    K 3.8 01/15/2019 1752     01/15/2019 1752    CO2 29 01/15/2019 1752    BUN 23 01/15/2019 1752    CREATININE 1.3 01/15/2019 1752     01/15/2019 1752        Component Value Date/Time    CALCIUM 8.9 01/15/2019 1752    ALKPHOS 107 01/15/2019 0247    AST 34 01/15/2019 0247    ALT 40 01/15/2019 0247    BILITOT 0.5 01/15/2019 0247    ESTGFRAFRICA >60.0 01/15/2019 1752    EGFRNONAA 56.9 (A) 01/15/2019 1752          Magnesium   Date Value Ref Range Status   08/30/2018 1.9 1.6 - 2.6 mg/dL Final     Lab Results   Component Value Date    WBC 7.80 01/15/2019    HGB 13.6 (L) 01/15/2019    HCT 43.3 01/15/2019     01/15/2019     Lab Results   Component Value Date    INR 0.9 01/15/2019    INR 0.9 09/06/2018    INR 0.9 08/29/2018     BNP   Date Value Ref Range Status   01/15/2019 199 (H) 0 - 99 pg/mL Final     Comment:     Values of less than 100 pg/ml are consistent with non-CHF populations.   11/11/2018 310 (H) 0 - 99 pg/mL Final     Comment:     Values of less than 100 pg/ml are consistent with non-CHF populations.   09/10/2018 234 (H) 0 - 99 pg/mL Final     Comment:     Values of less than 100 pg/ml are consistent with non-CHF populations.       Assessment:      1. Chronic systolic congestive heart failure    2. Cardiomyopathy, ischemic    3. Coronary artery disease involving native coronary artery of native heart without angina pectoris    4. Essential hypertension    5. CKD (chronic kidney disease), stage III    6. ICD (implantable cardioverter-defibrillator) in place        Plan:   65 y/o male with ICMP, HFrEF stage C with NYHA class III symptoms  Continue current HF regimen  Patient was instructed to call Dr. Elkins's  office to reschedule his CRT upgrade.  Had a long discussion with him about the benefits of CardiomEMS device should his symptoms not improve after CRT upgrade. Per GUIDE-HF study protocol, will have to wait at least 3 months post CRT before he can be eligible for the device.    Recommend 2 gram sodium restriction and 1500cc fluid restriction.  Encourage physical activity with graded exercise program.  Requested patient to weigh themselves daily, and to notify us if their weight increases by more than 3 lbs in 1 day or 5 lbs in 1 week.   RTC in 3 months with labs     Edison Marshall MD

## 2019-01-25 NOTE — PROGRESS NOTES
Patient, Audrey Oneil Jr. (MRN #[id number]), presented with a recorded BMI of 36.74 kg/m^2 and a documented comorbidity(s):  - Hypertension  - Atrial Fibrillation  to which the severe obesity is a contributing factor. This is consistent with the definition of severe obesity (BMI 35.0-39.9) with comorbidity (ICD-10 E66.01, Z68.35). The patient's severe obesity was monitored, evaluated, addressed and/or treated. This addendum to the medical record is made on 01/25/2019.

## 2019-02-02 DIAGNOSIS — M1A.0720 IDIOPATHIC CHRONIC GOUT OF LEFT FOOT WITHOUT TOPHUS: ICD-10-CM

## 2019-02-03 RX ORDER — ALLOPURINOL 100 MG/1
TABLET ORAL
Qty: 90 TABLET | Refills: 0 | Status: SHIPPED | OUTPATIENT
Start: 2019-02-03 | End: 2019-04-15 | Stop reason: SDUPTHER

## 2019-02-15 ENCOUNTER — HOSPITAL ENCOUNTER (OUTPATIENT)
Dept: CARDIOLOGY | Facility: CLINIC | Age: 69
Discharge: HOME | End: 2019-02-15
Payer: MEDICARE

## 2019-02-15 ENCOUNTER — OFFICE VISIT (OUTPATIENT)
Dept: ELECTROPHYSIOLOGY | Facility: CLINIC | Age: 69
End: 2019-02-15
Payer: MEDICARE

## 2019-02-15 VITALS
SYSTOLIC BLOOD PRESSURE: 136 MMHG | BODY MASS INDEX: 36.34 KG/M2 | HEIGHT: 67 IN | WEIGHT: 231.5 LBS | HEART RATE: 63 BPM | DIASTOLIC BLOOD PRESSURE: 70 MMHG

## 2019-02-15 DIAGNOSIS — I11.0 HYPERTENSIVE HEART DISEASE WITH HEART FAILURE: ICD-10-CM

## 2019-02-15 DIAGNOSIS — N18.30 CKD (CHRONIC KIDNEY DISEASE), STAGE III: ICD-10-CM

## 2019-02-15 DIAGNOSIS — Z79.899 HIGH RISK MEDICATIONS (NOT ANTICOAGULANTS) LONG-TERM USE: ICD-10-CM

## 2019-02-15 DIAGNOSIS — Z95.810 CARDIAC DEFIBRILLATOR IN PLACE: ICD-10-CM

## 2019-02-15 DIAGNOSIS — E66.01 SEVERE OBESITY (BMI 35.0-39.9) WITH COMORBIDITY: Chronic | ICD-10-CM

## 2019-02-15 DIAGNOSIS — I25.5 CARDIOMYOPATHY, ISCHEMIC: Chronic | ICD-10-CM

## 2019-02-15 DIAGNOSIS — I44.7 LBBB (LEFT BUNDLE BRANCH BLOCK): Primary | ICD-10-CM

## 2019-02-15 DIAGNOSIS — Z95.810 ICD (IMPLANTABLE CARDIOVERTER-DEFIBRILLATOR) IN PLACE: ICD-10-CM

## 2019-02-15 DIAGNOSIS — I25.10 CORONARY ARTERY DISEASE INVOLVING NATIVE CORONARY ARTERY OF NATIVE HEART WITHOUT ANGINA PECTORIS: ICD-10-CM

## 2019-02-15 PROCEDURE — 3075F PR MOST RECENT SYSTOLIC BLOOD PRESS GE 130-139MM HG: ICD-10-PCS | Mod: CPTII,S$GLB,, | Performed by: INTERNAL MEDICINE

## 2019-02-15 PROCEDURE — 99215 OFFICE O/P EST HI 40 MIN: CPT | Mod: S$GLB,,, | Performed by: INTERNAL MEDICINE

## 2019-02-15 PROCEDURE — 3078F DIAST BP <80 MM HG: CPT | Mod: CPTII,S$GLB,, | Performed by: INTERNAL MEDICINE

## 2019-02-15 PROCEDURE — 99999 PR PBB SHADOW E&M-EST. PATIENT-LVL II: CPT | Mod: PBBFAC,,, | Performed by: INTERNAL MEDICINE

## 2019-02-15 PROCEDURE — 99499 UNLISTED E&M SERVICE: CPT | Mod: S$GLB,,, | Performed by: INTERNAL MEDICINE

## 2019-02-15 PROCEDURE — 3078F PR MOST RECENT DIASTOLIC BLOOD PRESSURE < 80 MM HG: ICD-10-PCS | Mod: CPTII,S$GLB,, | Performed by: INTERNAL MEDICINE

## 2019-02-15 PROCEDURE — 93000 RHYTHM STRIP: ICD-10-PCS | Mod: S$GLB,,, | Performed by: INTERNAL MEDICINE

## 2019-02-15 PROCEDURE — 93000 ELECTROCARDIOGRAM COMPLETE: CPT | Mod: S$GLB,,, | Performed by: INTERNAL MEDICINE

## 2019-02-15 PROCEDURE — 99999 PR PBB SHADOW E&M-EST. PATIENT-LVL II: ICD-10-PCS | Mod: PBBFAC,,, | Performed by: INTERNAL MEDICINE

## 2019-02-15 PROCEDURE — 3075F SYST BP GE 130 - 139MM HG: CPT | Mod: CPTII,S$GLB,, | Performed by: INTERNAL MEDICINE

## 2019-02-15 PROCEDURE — 99499 RISK ADDL DX/OHS AUDIT: ICD-10-PCS | Mod: S$GLB,,, | Performed by: INTERNAL MEDICINE

## 2019-02-15 PROCEDURE — 1101F PR PT FALLS ASSESS DOC 0-1 FALLS W/OUT INJ PAST YR: ICD-10-PCS | Mod: CPTII,S$GLB,, | Performed by: INTERNAL MEDICINE

## 2019-02-15 PROCEDURE — 99215 PR OFFICE/OUTPT VISIT, EST, LEVL V, 40-54 MIN: ICD-10-PCS | Mod: S$GLB,,, | Performed by: INTERNAL MEDICINE

## 2019-02-15 PROCEDURE — 1101F PT FALLS ASSESS-DOCD LE1/YR: CPT | Mod: CPTII,S$GLB,, | Performed by: INTERNAL MEDICINE

## 2019-02-15 RX ORDER — CIMETIDINE 300 MG/1
TABLET, FILM COATED ORAL
Qty: 3 TABLET | Refills: 0 | Status: SHIPPED | OUTPATIENT
Start: 2019-02-15 | End: 2019-04-08 | Stop reason: SDUPTHER

## 2019-02-15 RX ORDER — PREDNISONE 50 MG/1
TABLET ORAL
Qty: 3 TABLET | Refills: 0 | Status: SHIPPED | OUTPATIENT
Start: 2019-02-15 | End: 2019-04-08 | Stop reason: SDUPTHER

## 2019-02-15 RX ORDER — DIPHENHYDRAMINE HCL 50 MG
50 CAPSULE ORAL
Qty: 3 CAPSULE | Refills: 0 | Status: ON HOLD | OUTPATIENT
Start: 2019-02-15 | End: 2019-05-03 | Stop reason: HOSPADM

## 2019-02-15 NOTE — PROGRESS NOTES
Dr. Pal has recommended that you have a Venogram of the blood vessels in your chest.  This is a test that requires you to have an IV catheter placed in a vein in one or both of your arms. IV contrast dye will be injected into the IVs while you are under an X-Ray machine in the EP lab. This allows the doctor to visualize any blockages in the blood vessels in your chest prior to performing any procedures.    You have been scheduled for a Venogram on 2/18/19 at 3:00 pm.   Please report to the Cardiology Waiting Area on the 3rd floor of the HOSPITAL    Directions for Reporting to Cardiology Waiting Area in the Hospital  If you park in the Parking Garage:  Take elevators to the 2nd floor  Walk up ramp and turn right by Gold Elevators  Take elevator to the 3rd floor  Upon exiting the elevator, turn away from the clinic areas  Walk long roberts around to front of hospital to area with windows overlooking [facility]  Check in at Reception Desk  OR  If family is dropping you off:  Have them drop you off at the front of the Hospital  (Near the ER, where all the flags are hung).  Take the E elevators to the 3rd floor.  Check in at the Reception Desk in the waiting room.    To prepare for the procedure:  You may take your usual morning medication, UNLESS YOU ARE PRESCRIBED METFORMIN (GLUCOPHAGE). Please hold morning of your venogram.        What to expect after the procedure:  You will be returned to your room after the procedure. You will be watched for any reactions to the IV contrast. As long as you continue to do well, your IV(s) will be removed from your arm(s). You will be able to eat and drink, and will be discharged within the hour after the procedure is complete.    For further information, questions, or concerns, please contact the Arrhythmia Clinic at [phone]

## 2019-02-15 NOTE — H&P (VIEW-ONLY)
Subjective:   Patient ID:  Audrey Oneil Jr. is a 68 y.o. male     Chief complaint:No chief complaint on file.      HPI    Background as recorded in Dr Marshall's note (1/12/18):  Mr. Oneil is a very pleasant 67 y.o. male with a hx of CAD s/p STEMI (s/p PCI LAD 2007), CHF/ICM and remote MI, quit smoking Dec 2015,  PE (2010, s/p 1-yr coumadin), HTN, DLP and s/p ICD [employer] placement due to VT who comes for a regular follow-up. He was last seen by Dr. Marshall 6/12/17 and had RHC 7/10/17 (see below). He has been declining over the last several months and most recent CPX (2/15/17) that showed low Peak VO2 of 7.5ml/kg/min but AT was not attained and RER was only 0.67 (poor effort). Most recent 2D echo showed severely depressed LV function with an EF=10-15%, LV with a LVEDD of 5.9  cm, normal RV size and function. Clinically reports NYHA class II-III symptoms.  RHC done showed normal left and right sided filling presssures. Low normal CO/CI.      Today, he reports doing well. NYHA Class II-III still as he is participating well with rehab. He has lost 5-6lbs over last 3 months and is trying to eat right and exercise. No HF readmissions and no cardiopulmonary complaints tdoay. He had a recent ER visit for gout flare but pain has resolved.      RHC 7/2017:  RA: 5/4 (1)  RV: 31/-6  PW:  (6)  PA: 32/2 (15)  AO: 120/64 (91)  PA_SAT: 64    CONDITION 1:  FICKCI: 2.1400  FICKCO: 4.6600     Update by me 4/19/18:  He had a bruise on his ICD pocket and was worried about it - so he came to see us.  He has a LBBB that started developing in 11/2015 and with slowly gradually widening QRS duration (124 in 11/2015 and now 140). His EF however has been as low as 10% 4 years ago.   I have reviewed the actual image of the ECG tracing obtained today and it shows NSR with LBBB and QRSd 140     Update since then:  We recommended CRT upgrade but he canceled   He has more HENSLEY -- gets winded at a block but can walk longer. A few months ago he could  walk many blocks w/o issues   Now, he wants to reschedule CRT upgrade that he missed the last time due to gout issues  I have reviewed the actual image of the ECG tracing obtained today and it shows NSR with LBBB, QRSd 150    Current Outpatient Medications   Medication Sig    allopurinol (ZYLOPRIM) 100 MG tablet TAKE 1 TABLET(100 MG) BY MOUTH EVERY DAY    amiodarone (PACERONE) 100 MG Tab Take 3 tablets (300 mg total) by mouth once daily. (Patient taking differently: Take 100 mg by mouth once daily. Patient is taking 1.5 tablets daily.)    aspirin (ECOTRIN) 325 MG EC tablet Take 325 mg by mouth once daily.    atorvastatin (LIPITOR) 80 MG tablet TAKE 1 TABLET(80 MG) BY MOUTH EVERY DAY    clopidogrel (PLAVIX) 75 mg tablet Take 1 tablet (75 mg total) by mouth once daily.    colchicine (COLCRYS) 0.6 mg tablet TAKE 2 TABLETS BY MOUTH ON DAY 1, THEN TAKE 1 TABLET BY MOUTH EVERY DAY AS NEEDED THEREAFTER (Patient taking differently: EVERY DAY AS NEEDED)    docusate sodium (COLACE) 50 MG capsule Take 1 capsule (50 mg total) by mouth 2 (two) times daily. (Patient taking differently: Take 50 mg by mouth 2 (two) times daily as needed. )    furosemide (LASIX) 40 MG tablet Take 1 tablet (40 mg total) by mouth 2 (two) times daily.    HYDROcodone-acetaminophen (NORCO)  mg per tablet Take 1 tablet by mouth every 6 (six) hours as needed for Pain.    metoprolol succinate (TOPROL-XL) 50 MG 24 hr tablet TAKE 1 TABLET BY MOUTH EVERY DAY     No current facility-administered medications for this visit.      Review of Systems   Constitution: Positive for malaise/fatigue. Negative for decreased appetite, weakness, weight gain and weight loss.   Eyes: Negative for blurred vision.   Cardiovascular: Negative for chest pain, claudication, cyanosis, dyspnea on exertion, irregular heartbeat, leg swelling, near-syncope, orthopnea and palpitations.   Respiratory: Negative for cough, shortness of breath, sleep disturbances due to  breathing, snoring and wheezing.    Endocrine: Negative for heat intolerance.   Hematologic/Lymphatic: Does not bruise/bleed easily.   Musculoskeletal: Negative for muscle weakness and myalgias.   Gastrointestinal: Negative for melena, nausea and vomiting.   Genitourinary: Negative for nocturia.   Neurological: Negative for excessive daytime sleepiness, dizziness, headaches and light-headedness.   Psychiatric/Behavioral: Negative for depression, memory loss and substance abuse. The patient does not have insomnia and is not nervous/anxious.        Objective:   Physical Exam   Constitutional: He is oriented to person, place, and time. He appears well-developed and well-nourished.   overweight   HENT:   Head: Normocephalic and atraumatic.   Right Ear: External ear normal.   Left Ear: External ear normal.   Eyes: Conjunctivae are normal. Pupils are equal, round, and reactive to light. Left conjunctiva is not injected. Left conjunctiva has no hemorrhage.   Neck: Neck supple. No JVD present. No thyromegaly present.   Cardiovascular: Normal rate, regular rhythm, normal heart sounds and intact distal pulses. PMI is not displaced. Exam reveals no gallop, no friction rub, no midsystolic click and no opening snap.   No murmur heard.  Pulses:       Carotid pulses are 2+ on the right side, and 2+ on the left side.       Radial pulses are 2+ on the right side, and 2+ on the left side.        Dorsalis pedis pulses are 2+ on the right side, and 2+ on the left side.        Posterior tibial pulses are 2+ on the right side, and 2+ on the left side.   Pulmonary/Chest: Effort normal and breath sounds normal. No respiratory distress. He has no wheezes. He has no rales. He exhibits no tenderness.   Device pocket is in excellent repair     Abdominal: Soft. Normal appearance. He exhibits no pulsatile liver. There is no hepatomegaly. There is no tenderness. There is no rigidity and no guarding.   Obese abdomen   Musculoskeletal: Normal range  "of motion. He exhibits no edema or tenderness.        Right knee: He exhibits no swelling.        Left knee: He exhibits no swelling.        Right ankle: He exhibits no swelling.        Left ankle: He exhibits no swelling.        Right lower leg: He exhibits no swelling.        Left lower leg: He exhibits no swelling.        Right foot: There is no swelling.        Left foot: There is no swelling.   Neurological: He is alert and oriented to person, place, and time. He has normal strength and normal reflexes. No cranial nerve deficit. Coordination normal.   Skin: Skin is warm, dry and intact. No rash noted. No cyanosis. No pallor.   Psychiatric: He has a normal mood and affect. His behavior is normal.   Nursing note and vitals reviewed.    /70   Pulse 63   Ht 5' 7" (1.702 m)   Wt 105 kg (231 lb 7.7 oz)   BMI 36.26 kg/m²      Assessment:      1. LBBB (left bundle branch block)    2. ICD (implantable cardioverter-defibrillator) in place    3. Hypertensive heart disease with heart failure    4. Coronary artery disease involving native coronary artery of native heart without angina pectoris    5. Cardiomyopathy, ischemic    6. CKD (chronic kidney disease), stage III    7. Severe obesity (BMI 35.0-39.9) with comorbidity    8. High risk medications (amiodarone) long-term use        Plan:    Upgrade to CRt -- Venogram before to see if needs R to L tunneling  I have discussed the procedure in detail with the patient. I described its benefits and risks. I reviewed alternative therapies and discussed their potential value. The patient was given ample opportunity to express concerns and ask questions and I provided appropriate responses and  answers to such.The patient understands and agrees to proceed.  Consent form was signed today by patient and myself and appropriately witnessed.     No orders of the defined types were placed in this encounter.    Follow-up for post op.  There are no discontinued medications.   "   Medication List with Changes/Refills   Current Medications    ALLOPURINOL (ZYLOPRIM) 100 MG TABLET    TAKE 1 TABLET(100 MG) BY MOUTH EVERY DAY    AMIODARONE (PACERONE) 100 MG TAB    Take 3 tablets (300 mg total) by mouth once daily.    ASPIRIN (ECOTRIN) 325 MG EC TABLET    Take 325 mg by mouth once daily.    ATORVASTATIN (LIPITOR) 80 MG TABLET    TAKE 1 TABLET(80 MG) BY MOUTH EVERY DAY    CLOPIDOGREL (PLAVIX) 75 MG TABLET    Take 1 tablet (75 mg total) by mouth once daily.    COLCHICINE (COLCRYS) 0.6 MG TABLET    TAKE 2 TABLETS BY MOUTH ON DAY 1, THEN TAKE 1 TABLET BY MOUTH EVERY DAY AS NEEDED THEREAFTER    DOCUSATE SODIUM (COLACE) 50 MG CAPSULE    Take 1 capsule (50 mg total) by mouth 2 (two) times daily.    FUROSEMIDE (LASIX) 40 MG TABLET    Take 1 tablet (40 mg total) by mouth 2 (two) times daily.    HYDROCODONE-ACETAMINOPHEN (NORCO)  MG PER TABLET    Take 1 tablet by mouth every 6 (six) hours as needed for Pain.    METOPROLOL SUCCINATE (TOPROL-XL) 50 MG 24 HR TABLET    TAKE 1 TABLET BY MOUTH EVERY DAY

## 2019-02-18 ENCOUNTER — HOSPITAL ENCOUNTER (OUTPATIENT)
Facility: HOSPITAL | Age: 69
Discharge: HOME | End: 2019-02-18
Attending: INTERNAL MEDICINE | Admitting: INTERNAL MEDICINE
Payer: MEDICARE

## 2019-02-18 DIAGNOSIS — I25.10 CORONARY ARTERY DISEASE INVOLVING NATIVE CORONARY ARTERY OF NATIVE HEART WITHOUT ANGINA PECTORIS: Primary | ICD-10-CM

## 2019-02-18 DIAGNOSIS — I44.7 LBBB (LEFT BUNDLE BRANCH BLOCK): ICD-10-CM

## 2019-02-18 DIAGNOSIS — I25.5 CARDIOMYOPATHY, ISCHEMIC: ICD-10-CM

## 2019-02-18 PROCEDURE — 36005 INJECTION EXT VENOGRAPHY: CPT | Mod: LT,,, | Performed by: INTERNAL MEDICINE

## 2019-02-18 PROCEDURE — 25500020 PHARM REV CODE 255: Performed by: INTERNAL MEDICINE

## 2019-02-18 PROCEDURE — 75820 PR VENOGRAM EXTREMITY UNILAT: ICD-10-PCS | Mod: 26,,, | Performed by: INTERNAL MEDICINE

## 2019-02-18 PROCEDURE — 25000003 PHARM REV CODE 250: Performed by: INTERNAL MEDICINE

## 2019-02-18 PROCEDURE — 36005 PR INJECTION PROC,EXTREMITY,VENOGRAPHY: ICD-10-PCS | Mod: LT,,, | Performed by: INTERNAL MEDICINE

## 2019-02-18 PROCEDURE — S0028 INJECTION, FAMOTIDINE, 20 MG: HCPCS | Performed by: INTERNAL MEDICINE

## 2019-02-18 PROCEDURE — 36005 INJECTION EXT VENOGRAPHY: CPT | Mod: LT | Performed by: INTERNAL MEDICINE

## 2019-02-18 PROCEDURE — 75820 VEIN X-RAY ARM/LEG: CPT | Mod: 26,,, | Performed by: INTERNAL MEDICINE

## 2019-02-18 PROCEDURE — 75820 VEIN X-RAY ARM/LEG: CPT | Performed by: INTERNAL MEDICINE

## 2019-02-18 RX ORDER — IODIXANOL 320 MG/ML
INJECTION, SOLUTION INTRAVASCULAR
Status: DISCONTINUED | OUTPATIENT
Start: 2019-02-18 | End: 2019-02-18 | Stop reason: HOSPADM

## 2019-02-18 RX ORDER — FAMOTIDINE 10 MG/ML
INJECTION INTRAVENOUS
Status: DISCONTINUED | OUTPATIENT
Start: 2019-02-18 | End: 2019-02-18 | Stop reason: HOSPADM

## 2019-02-18 RX ORDER — FAMOTIDINE 10 MG/ML
20 INJECTION INTRAVENOUS ONCE
Status: DISCONTINUED | OUTPATIENT
Start: 2019-02-18 | End: 2019-02-18 | Stop reason: HOSPADM

## 2019-02-18 NOTE — DISCHARGE SUMMARY
[facility]  Cardiac Electrophysiology  Discharge Summary      Patient Name: Audrey Oneil Jr.  MRN: [id number]  Admission Date: 2/18/2019  Hospital Length of Stay: 0 days  Discharge Date and Time:  02/18/2019   Attending Physician: MD Teofilo Pal   Discharging Provider: Zuri Lynch NP  Primary Care Physician: January Khan MD    HPI:    68 y.o. male with a hx of CAD s/p STEMI (s/p PCI LAD 2007), CHF/ICM and remote MI, quit smoking Dec 2015,  PE (2010, s/p 1-yr coumadin), HTN, DLP and s/p ICD [employer] placement due to VT. Patient is planned for   Upgrade to CRt -- Venogram today today  to see if needs R to L tunneling. Patient denies any acute symptoms on admit     Procedure(s) (LRB):  Venogram, EP Lab (Bilateral)     Hospital Course:  Patient underwent outpatient venogram ( see procedure noted for details) tolerated procedure. EP clinic will coordinate with patient regarding upcoming procedures and pre- op instructions.       Discharged Condition: good    Disposition: Home or Self CareHOME     Follow Up:  Follow-up Information     Teofilo Pal MD.    Specialties:  Electrophysiology, Cardiology  Why:  EP clinic will coordinate upcoming procedure   Contact information:  [address] [place] [address] [id number] [phone]                 Patient Instructions:      Notify your health care provider if you experience any of the following:  temperature >100.4     Notify your health care provider if you experience any of the following:  persistent nausea and vomiting or diarrhea     Notify your health care provider if you experience any of the following:  severe uncontrolled pain     Notify your health care provider if you experience any of the following:  redness, tenderness, or signs of infection (pain, swelling, redness, odor or green/yellow discharge around incision site)     Notify your health care provider if you experience any of the following:  difficulty breathing or increased  cough     Notify your health care provider if you experience any of the following:  severe persistent headache     Notify your health care provider if you experience any of the following:  worsening rash     Notify your health care provider if you experience any of the following:  persistent dizziness, light-headedness, or visual disturbances     Notify your health care provider if you experience any of the following:  increased confusion or weakness     Notify your health care provider if you experience any of the following:   Order Comments: For any concerning medical symptoms     Medications:  Reconciled Home Medications:      Medication List      CHANGE how you take these medications    amiodarone 100 MG Tab  Commonly known as:  PACERONE  Take 3 tablets (300 mg total) by mouth once daily.  What changed:    · how much to take  · additional instructions     colchicine 0.6 mg tablet  Commonly known as:  COLCRYS  TAKE 2 TABLETS BY MOUTH ON DAY 1, THEN TAKE 1 TABLET BY MOUTH EVERY DAY AS NEEDED THEREAFTER  What changed:  See the new instructions.     docusate sodium 50 MG capsule  Commonly known as:  COLACE  Take 1 capsule (50 mg total) by mouth 2 (two) times daily.  What changed:    · when to take this  · reasons to take this        CONTINUE taking these medications    allopurinol 100 MG tablet  Commonly known as:  ZYLOPRIM  TAKE 1 TABLET(100 MG) BY MOUTH EVERY DAY     aspirin 325 MG EC tablet  Commonly known as:  ECOTRIN  Take 325 mg by mouth once daily.     atorvastatin 80 MG tablet  Commonly known as:  LIPITOR  TAKE 1 TABLET(80 MG) BY MOUTH EVERY DAY     cimetidine 300 MG tablet  Commonly known as:  TAGAMET  Take 300 mg at 13 hours prior, 7 hours prior, and 1 hour prior to procedure     clopidogrel 75 mg tablet  Commonly known as:  PLAVIX  Take 1 tablet (75 mg total) by mouth once daily.     diphenhydrAMINE 50 MG capsule  Commonly known as:  BENADRYL  Take 1 capsule (50 mg total) by mouth as needed for Allergies  (Take 50 mg at 13 hours prior, 7 hours prior, and 1 hour prior to procedure).     furosemide 40 MG tablet  Commonly known as:  LASIX  Take 1 tablet (40 mg total) by mouth 2 (two) times daily.     HYDROcodone-acetaminophen  mg per tablet  Commonly known as:  NORCO  Take 1 tablet by mouth every 6 (six) hours as needed for Pain.     metoprolol succinate 50 MG 24 hr tablet  Commonly known as:  TOPROL-XL  TAKE 1 TABLET BY MOUTH EVERY DAY     predniSONE 50 MG Tab  Commonly known as:  DELTASONE  Take 50 mg at 13 hours prior, 7 hours prior, and 1 hour prior to procedure            Zuri Lynch NP  Cardiac Electrophysiology  [facility]    STAFF MD Teofilo Pal

## 2019-02-18 NOTE — INTERVAL H&P NOTE
The patient has been examined and the H&P has been reviewed:    I concur with the findings and no changes have occurred since H&P was written. Patient denies any overt shortness of breath, fevers, chills malaise. Patient states took benadry and prednisone 3 doses as RX. Pt admits to not taking tagamet. Discussed with STAFF MD Pal> will give pepcid IV x1.   Labs from 1/15/19 > BUN/SCr 23/1.3     PLAN:   Venogram today > plan for outpatient CRT      Prior to procedure, extensive discussion with patient regarding risks and benefits of  venogram , and patient  would like to proceed.  The patient   voices understanding and all questions have been answered. No further questions/concerns voiced at this time.       MEHRAN Hodges-BC  Cardiology Electrophysiology  NP   [facility]    Attending: MD Teofilo Anderson           There are no hospital problems to display for this patient.

## 2019-02-28 ENCOUNTER — DOCUMENTATION ONLY (OUTPATIENT)
Dept: ELECTROPHYSIOLOGY | Facility: CLINIC | Age: 69
End: 2019-02-28

## 2019-02-28 NOTE — PROGRESS NOTES
2-25-19: PER Milena TALAMANTES Supervisor-- Charles TALAMANTES called patient to provide potential date for EP procedure with . Per Charles TALAMANTES-  Unable to reach patient.      Jessie TALAMANTES CCM

## 2019-03-04 RX ORDER — FUROSEMIDE 40 MG/1
40 TABLET ORAL 2 TIMES DAILY
Qty: 60 TABLET | Refills: 11 | Status: SHIPPED | OUTPATIENT
Start: 2019-03-04 | End: 2019-04-15 | Stop reason: SDUPTHER

## 2019-03-08 ENCOUNTER — TELEPHONE (OUTPATIENT)
Dept: ELECTROPHYSIOLOGY | Facility: CLINIC | Age: 69
End: 2019-03-08

## 2019-03-08 NOTE — TELEPHONE ENCOUNTER
Attempt to call patient, to see about possible EP procedure date on 3-11-19- called [phone]- no answer- unable to leave a message. Called Patient's brother Nathanael [phone]- patient does not have cell phone at this time. Nathanael will try to reach out to patient and ask him to call me.      Jessie TALAMANTES CCM

## 2019-03-12 ENCOUNTER — TELEPHONE (OUTPATIENT)
Dept: ELECTROPHYSIOLOGY | Facility: CLINIC | Age: 69
End: 2019-03-12

## 2019-03-12 NOTE — TELEPHONE ENCOUNTER
Patient's brother Nathanael provided this RN with the phone number for patient's son- Allan [phone]. Called Allan and spoke with patient-  provided potential date for EP procedure. Patient in agreement. Will sent instructions via portal and mail. Notified need for contrast prep and instructed on how to take contrast prep medication.    Jessie TALAMANTES CCM

## 2019-03-12 NOTE — TELEPHONE ENCOUNTER
----- Message from Angella Lal RN sent at 3/11/2019  5:35 PM CDT -----  Contact: Patient's brother in [facility]      ----- Message -----  From: Vandana Vidal  Sent: 3/11/2019   1:38 PM  To: Kae GEORGE Staff    The Pt's brother is returning a call. Please call him back @ [phone]. Thanks, Vandana

## 2019-03-22 DIAGNOSIS — M1A.0720 IDIOPATHIC CHRONIC GOUT OF LEFT FOOT WITHOUT TOPHUS: ICD-10-CM

## 2019-03-22 RX ORDER — COLCHICINE 0.6 MG/1
TABLET, FILM COATED ORAL
Qty: 20 TABLET | Refills: 0 | Status: SHIPPED | OUTPATIENT
Start: 2019-03-22 | End: 2019-06-10 | Stop reason: SDUPTHER

## 2019-04-02 ENCOUNTER — TELEPHONE (OUTPATIENT)
Dept: ELECTROPHYSIOLOGY | Facility: CLINIC | Age: 69
End: 2019-04-02

## 2019-04-02 NOTE — TELEPHONE ENCOUNTER
Returned  Call to Pt., no answer. Left message that he is scheduled for 5/03/19 @ 10am. Advised to call for any further questions.        ----- Message from Greta White sent at 4/2/2019 10:46 AM CDT -----  Contact: Patient   Morning,   Mr. Oneil contacted the clinic regarding his procedure that he's to have with Dr. Pal.  Patient  asked that he's to be reached at [phone] to confirm procedure date and time.     Thanks   Greta

## 2019-04-06 RX ORDER — METOPROLOL SUCCINATE 50 MG/1
TABLET, EXTENDED RELEASE ORAL
Qty: 90 TABLET | Refills: 0 | OUTPATIENT
Start: 2019-04-06

## 2019-04-08 DIAGNOSIS — Z91.041 ALLERGY TO IODINATED CONTRAST: Primary | ICD-10-CM

## 2019-04-08 DIAGNOSIS — Z95.810 ICD (IMPLANTABLE CARDIOVERTER-DEFIBRILLATOR) IN PLACE: ICD-10-CM

## 2019-04-08 DIAGNOSIS — I50.41 ACUTE COMBINED SYSTOLIC AND DIASTOLIC CONGESTIVE HEART FAILURE: ICD-10-CM

## 2019-04-08 DIAGNOSIS — I25.5 CARDIOMYOPATHY, ISCHEMIC: Primary | Chronic | ICD-10-CM

## 2019-04-08 RX ORDER — PREDNISONE 50 MG/1
TABLET ORAL
Qty: 3 TABLET | Refills: 0 | Status: ON HOLD | OUTPATIENT
Start: 2019-04-08 | End: 2019-05-03 | Stop reason: HOSPADM

## 2019-04-08 RX ORDER — CIMETIDINE 300 MG/1
TABLET, FILM COATED ORAL
Qty: 3 TABLET | Refills: 0 | Status: ON HOLD | OUTPATIENT
Start: 2019-04-08 | End: 2019-05-03 | Stop reason: HOSPADM

## 2019-04-15 ENCOUNTER — LAB VISIT (OUTPATIENT)
Dept: LAB | Facility: HOSPITAL | Age: 69
End: 2019-04-15
Attending: INTERNAL MEDICINE
Payer: MEDICARE

## 2019-04-15 ENCOUNTER — OFFICE VISIT (OUTPATIENT)
Dept: TRANSPLANT | Facility: CLINIC | Age: 69
End: 2019-04-15
Attending: INTERNAL MEDICINE
Payer: MEDICARE

## 2019-04-15 VITALS
WEIGHT: 224.88 LBS | DIASTOLIC BLOOD PRESSURE: 61 MMHG | SYSTOLIC BLOOD PRESSURE: 135 MMHG | BODY MASS INDEX: 35.29 KG/M2 | HEIGHT: 67 IN | HEART RATE: 73 BPM

## 2019-04-15 DIAGNOSIS — I50.42 CHRONIC COMBINED SYSTOLIC AND DIASTOLIC CONGESTIVE HEART FAILURE: Primary | ICD-10-CM

## 2019-04-15 DIAGNOSIS — I25.5 CARDIOMYOPATHY, ISCHEMIC: Chronic | ICD-10-CM

## 2019-04-15 DIAGNOSIS — E78.5 HYPERLIPIDEMIA LDL GOAL <70: ICD-10-CM

## 2019-04-15 DIAGNOSIS — I11.0 HYPERTENSIVE HEART DISEASE WITH HEART FAILURE: ICD-10-CM

## 2019-04-15 DIAGNOSIS — M1A.0720 IDIOPATHIC CHRONIC GOUT OF LEFT FOOT WITHOUT TOPHUS: ICD-10-CM

## 2019-04-15 DIAGNOSIS — I25.10 CORONARY ARTERY DISEASE INVOLVING NATIVE CORONARY ARTERY OF NATIVE HEART WITHOUT ANGINA PECTORIS: ICD-10-CM

## 2019-04-15 DIAGNOSIS — I50.22 CHRONIC SYSTOLIC CONGESTIVE HEART FAILURE: ICD-10-CM

## 2019-04-15 DIAGNOSIS — I44.7 LBBB (LEFT BUNDLE BRANCH BLOCK): ICD-10-CM

## 2019-04-15 PROBLEM — N18.30 CKD (CHRONIC KIDNEY DISEASE), STAGE III: Status: RESOLVED | Noted: 2017-06-02 | Resolved: 2019-04-15

## 2019-04-15 PROBLEM — N18.30 ACUTE RENAL FAILURE SUPERIMPOSED ON STAGE 3 CHRONIC KIDNEY DISEASE: Status: RESOLVED | Noted: 2018-06-15 | Resolved: 2019-04-15

## 2019-04-15 PROBLEM — E87.5 HYPERKALEMIA: Status: RESOLVED | Noted: 2018-06-15 | Resolved: 2019-04-15

## 2019-04-15 PROBLEM — N17.9 ACUTE RENAL FAILURE SUPERIMPOSED ON STAGE 3 CHRONIC KIDNEY DISEASE: Status: RESOLVED | Noted: 2018-06-15 | Resolved: 2019-04-15

## 2019-04-15 LAB
ALBUMIN SERPL BCP-MCNC: 3.4 G/DL (ref 3.5–5.2)
ALP SERPL-CCNC: 104 U/L (ref 55–135)
ALT SERPL W/O P-5'-P-CCNC: 66 U/L (ref 10–44)
ANION GAP SERPL CALC-SCNC: 10 MMOL/L (ref 8–16)
AST SERPL-CCNC: 55 U/L (ref 10–40)
BILIRUB SERPL-MCNC: 0.7 MG/DL (ref 0.1–1)
BNP SERPL-MCNC: 251 PG/ML (ref 0–99)
BUN SERPL-MCNC: 22 MG/DL (ref 8–23)
CALCIUM SERPL-MCNC: 9.2 MG/DL (ref 8.7–10.5)
CHLORIDE SERPL-SCNC: 104 MMOL/L (ref 95–110)
CO2 SERPL-SCNC: 26 MMOL/L (ref 23–29)
CREAT SERPL-MCNC: 1 MG/DL (ref 0.5–1.4)
EST. GFR  (AFRICAN AMERICAN): >60 ML/MIN/1.73 M^2
EST. GFR  (NON AFRICAN AMERICAN): >60 ML/MIN/1.73 M^2
GLUCOSE SERPL-MCNC: 115 MG/DL (ref 70–110)
POTASSIUM SERPL-SCNC: 3.8 MMOL/L (ref 3.5–5.1)
PROT SERPL-MCNC: 6.5 G/DL (ref 6–8.4)
SODIUM SERPL-SCNC: 140 MMOL/L (ref 136–145)

## 2019-04-15 PROCEDURE — 99213 OFFICE O/P EST LOW 20 MIN: CPT | Mod: S$GLB,,, | Performed by: INTERNAL MEDICINE

## 2019-04-15 PROCEDURE — 36415 COLL VENOUS BLD VENIPUNCTURE: CPT

## 2019-04-15 PROCEDURE — 3075F PR MOST RECENT SYSTOLIC BLOOD PRESS GE 130-139MM HG: ICD-10-PCS | Mod: CPTII,S$GLB,, | Performed by: INTERNAL MEDICINE

## 2019-04-15 PROCEDURE — 83880 ASSAY OF NATRIURETIC PEPTIDE: CPT

## 2019-04-15 PROCEDURE — 1101F PR PT FALLS ASSESS DOC 0-1 FALLS W/OUT INJ PAST YR: ICD-10-PCS | Mod: CPTII,S$GLB,, | Performed by: INTERNAL MEDICINE

## 2019-04-15 PROCEDURE — 1101F PT FALLS ASSESS-DOCD LE1/YR: CPT | Mod: CPTII,S$GLB,, | Performed by: INTERNAL MEDICINE

## 2019-04-15 PROCEDURE — 3078F DIAST BP <80 MM HG: CPT | Mod: CPTII,S$GLB,, | Performed by: INTERNAL MEDICINE

## 2019-04-15 PROCEDURE — 99213 PR OFFICE/OUTPT VISIT, EST, LEVL III, 20-29 MIN: ICD-10-PCS | Mod: S$GLB,,, | Performed by: INTERNAL MEDICINE

## 2019-04-15 PROCEDURE — 3078F PR MOST RECENT DIASTOLIC BLOOD PRESSURE < 80 MM HG: ICD-10-PCS | Mod: CPTII,S$GLB,, | Performed by: INTERNAL MEDICINE

## 2019-04-15 PROCEDURE — 99499 RISK ADDL DX/OHS AUDIT: ICD-10-PCS | Mod: S$GLB,,, | Performed by: INTERNAL MEDICINE

## 2019-04-15 PROCEDURE — 3075F SYST BP GE 130 - 139MM HG: CPT | Mod: CPTII,S$GLB,, | Performed by: INTERNAL MEDICINE

## 2019-04-15 PROCEDURE — 99499 UNLISTED E&M SERVICE: CPT | Mod: S$GLB,,, | Performed by: INTERNAL MEDICINE

## 2019-04-15 PROCEDURE — 80053 COMPREHEN METABOLIC PANEL: CPT

## 2019-04-15 PROCEDURE — 99999 PR PBB SHADOW E&M-EST. PATIENT-LVL III: ICD-10-PCS | Mod: PBBFAC,,, | Performed by: INTERNAL MEDICINE

## 2019-04-15 PROCEDURE — 99999 PR PBB SHADOW E&M-EST. PATIENT-LVL III: CPT | Mod: PBBFAC,,, | Performed by: INTERNAL MEDICINE

## 2019-04-15 NOTE — Clinical Note
Teofilo, needs CRT upgrade scheduledSelim, please initiate ACE, ARB or ARNI and let HF nurses know when you want him to return to see you

## 2019-04-15 NOTE — LETTER
April 15, 2019        Elvin Simms Jr.  [address]  [place] [address]  Phone: [phone]  Fax: [phone]             [facility]  [address]  [facility] [address]  Phone: [phone]   Patient: Audrey Oneil Jr.   MR Number: [id number]   YOB: 1950   Date of Visit: 4/15/2019       Dear Dr. Elvin Simms Jr.    Thank you for referring Audrey Oneil to me for evaluation. Attached you will find relevant portions of my assessment and plan of care.    If you have questions, please do not hesitate to call me. I look forward to following Audery Oneil along with you.    Sincerely,    Migue Henry Jr, MD    Enclosure    If you would like to receive this communication electronically, please contact [email or web address] or [phone] to request [employer] Link access.    [employer] Link is a tool which provides read-only access to select patient information with whom you have a relationship. Its easy to use and provides real time access to review your patients record including encounter summaries, notes, results, and demographic information.    If you feel you have received this communication in error or would no longer like to receive these types of communications, please e-mail [email or web address]

## 2019-04-16 RX ORDER — METOPROLOL SUCCINATE 50 MG/1
50 TABLET, EXTENDED RELEASE ORAL DAILY
Qty: 90 TABLET | Refills: 3 | Status: SHIPPED | OUTPATIENT
Start: 2019-04-16 | End: 2020-05-12 | Stop reason: SDUPTHER

## 2019-04-16 RX ORDER — FUROSEMIDE 40 MG/1
40 TABLET ORAL 2 TIMES DAILY
Qty: 60 TABLET | Refills: 11 | Status: SHIPPED | OUTPATIENT
Start: 2019-04-16 | End: 2020-06-08

## 2019-04-16 RX ORDER — ALLOPURINOL 100 MG/1
TABLET ORAL
Qty: 90 TABLET | Refills: 3 | Status: SHIPPED | OUTPATIENT
Start: 2019-04-16 | End: 2019-12-02 | Stop reason: SDUPTHER

## 2019-04-16 RX ORDER — AMIODARONE HYDROCHLORIDE 100 MG/1
100 TABLET ORAL DAILY
Qty: 30 TABLET | Refills: 11 | Status: SHIPPED | OUTPATIENT
Start: 2019-04-16 | End: 2020-03-18

## 2019-04-16 NOTE — PROGRESS NOTES
"Subjective:     Patient ID:  Audrey Oneil Jr. is a 68 y.o. male who presents for follow-up of Congestive Heart Failure    HPI:  69 yo WM with CAD s/p STEMI (s/p PCI LAD 2007), CHFI, ischemic cardiomyopathy, PE (2010, s/p 1-yr coumadin), HTN, DLP and s/p ICD [employer] who comes for a regular follow-up with Dr. Marshall today as planned but seeing me as I am covering his clinic today.  He reports all is stable though for past few days soreness both flanks noted.  No change in cardiac symptoms.  He was to undergo upgrade to CRT-D with Dr. Pal (see venogram 2/18/19) but he has not yet undergone this procedure.  He is not on ACE, ARB or ARNI but I do not see why.  I am covering for Dr. Marshall so will send note to him to initiate agent of his choice.    ROS not repeated     Objective:   Physical Exam   Constitutional: He appears well-nourished. No distress.   /61 (BP Location: Left arm, Patient Position: Sitting, BP Method: Medium (Automatic))   Pulse 73   Ht 5' 7" (1.702 m)   Wt 102 kg (224 lb 13.9 oz)   BMI 35.22 kg/m²   Obese WM in NAD   HENT:   Head: Normocephalic and atraumatic.   Eyes: Conjunctivae are normal. Right eye exhibits no discharge. Left eye exhibits no discharge. No scleral icterus.   Neck: No JVD present. No thyromegaly present.   Cardiovascular: Normal rate, regular rhythm and normal heart sounds. Exam reveals no gallop.   No murmur heard.  Pulmonary/Chest: Effort normal and breath sounds normal.   Abdominal: Soft. Bowel sounds are normal. He exhibits distension (very obese, difficult exam; no flank tenderness to percussion). He exhibits no mass. There is no tenderness. There is no rebound and no guarding.   Musculoskeletal: He exhibits edema (0.5+ edema). He exhibits no tenderness.   Skin: Skin is warm and dry. He is not diaphoretic.      Lab Results   Component Value Date     (H) 04/15/2019     (H) 01/25/2019     (H) 01/15/2019       04/15/2019    K 3.8 04/15/2019 "     04/15/2019    CO2 26 04/15/2019    BUN 22 04/15/2019    CREATININE 1.0 04/15/2019     (H) 04/15/2019    HGBA1C 5.9 (H) 01/15/2019    AST 55 (H) 04/15/2019    ALT 66 (H) 04/15/2019    ALBUMIN 3.4 (L) 04/15/2019    PROT 6.5 04/15/2019    BILITOT 0.7 04/15/2019     Assessment:     1. Chronic combined systolic and diastolic congestive heart failure    2. Cardiomyopathy, ischemic    3. Coronary artery disease involving native coronary artery of native heart without angina pectoris    4. Hyperlipidemia LDL goal <70    5. Hypertensive heart disease with heart failure    6. LBBB (left bundle branch block)      Plan:   He is not on ACE, ARB or ARNI but I do not see why.  I am covering for Dr. Marshall so will send note to him to initiate agent of his choice.  Send note Dr. Pal to assure CRT-D scheduled upon his return  F/U per Dr. Marshall after he decides on unloading agent he wishes to initiate.

## 2019-04-25 ENCOUNTER — CLINICAL SUPPORT (OUTPATIENT)
Dept: CARDIOLOGY | Facility: HOSPITAL | Age: 69
End: 2019-04-25
Attending: INTERNAL MEDICINE
Payer: MEDICARE

## 2019-04-25 ENCOUNTER — TELEPHONE (OUTPATIENT)
Dept: ELECTROPHYSIOLOGY | Facility: CLINIC | Age: 69
End: 2019-04-25

## 2019-04-25 DIAGNOSIS — Z95.810 CARDIAC DEFIBRILLATOR IN PLACE: ICD-10-CM

## 2019-04-25 PROCEDURE — 93283 PRGRMG EVAL IMPLANTABLE DFB: CPT

## 2019-04-25 NOTE — TELEPHONE ENCOUNTER
----- Message from Rebecca Fernandez sent at 4/24/2019  3:28 PM CDT -----  Contact: Self      ----- Message -----  From: Alesia Daniels  Sent: 4/24/2019   2:14 PM  To: Kae GEORGE Staff    .Needs Advice    Reason for call: Pt would like someone to call him to go over the procedure schedule for 5/3. Thanks        Communication Preference: [phone]    Additional Information:

## 2019-04-25 NOTE — TELEPHONE ENCOUNTER
Spoke with patient. Went over the following with patient: pre-operative lab work date and time, EP procedure date, arrival time, pre-operative contrast prep. All questions answered. Advised pt to call clinic if pt had any questions or concerns.       Jessie TALAMANTES CCM

## 2019-04-30 ENCOUNTER — DOCUMENTATION ONLY (OUTPATIENT)
Dept: ELECTROPHYSIOLOGY | Facility: CLINIC | Age: 69
End: 2019-04-30

## 2019-04-30 ENCOUNTER — HOSPITAL ENCOUNTER (EMERGENCY)
Facility: HOSPITAL | Age: 69
Discharge: HOME | End: 2019-04-30
Attending: EMERGENCY MEDICINE
Payer: MEDICARE

## 2019-04-30 VITALS
RESPIRATION RATE: 18 BRPM | HEIGHT: 67 IN | BODY MASS INDEX: 34.53 KG/M2 | WEIGHT: 220 LBS | DIASTOLIC BLOOD PRESSURE: 72 MMHG | TEMPERATURE: 98 F | HEART RATE: 70 BPM | SYSTOLIC BLOOD PRESSURE: 125 MMHG | OXYGEN SATURATION: 97 %

## 2019-04-30 DIAGNOSIS — Z13.9 ENCOUNTER FOR MEDICAL SCREENING EXAMINATION: Primary | ICD-10-CM

## 2019-04-30 PROCEDURE — 99281 EMR DPT VST MAYX REQ PHY/QHP: CPT

## 2019-04-30 PROCEDURE — 99282 EMERGENCY DEPT VISIT SF MDM: CPT | Mod: ,,, | Performed by: EMERGENCY MEDICINE

## 2019-04-30 PROCEDURE — 99282 PR EMERGENCY DEPT VISIT,LEVEL II: ICD-10-PCS | Mod: ,,, | Performed by: EMERGENCY MEDICINE

## 2019-04-30 NOTE — PROGRESS NOTES
Patient walk up to  to notify that he thinks he may have got bitten on his lip by a bug and wants to see what he should do. Patient concerned that this may interfere with upcoming EP procedure. Advise patient to go to PCP office across the street and ask for appointment on today to have his lip examined. Patient will call to notify our office of PCP recommendations.       Jessie TALAMANTES CCM

## 2019-04-30 NOTE — ED TRIAGE NOTES
"Patient states she got bit by an insect 2 days ago, concerned  That he saw something on tv about the "kissing bug" requests blood work due to having surgery in 3 days. No redness, minimal swelling noted to upper lip. Benadryl 50 mg last night. Denies any symptoms   "

## 2019-04-30 NOTE — ED PROVIDER NOTES
Encounter Date: 4/30/2019    SCRIBE #1 NOTE: I, Sejal Beckford, am scribing for, and in the presence of,  Dr. Nova. I have scribed the following portions of the note - Other sections scribed: HPI, ROS, MDM, PE, Disposition.       History     Chief Complaint   Patient presents with    Insect Bite     to my upper lip, saw on tv last night kissing bug     68 y.o. male presents to the ED over concern for insect bite. While riding in a vehicle over the weekend, the patient believes he was either bit or stung by an insect on his upper lip. While watching TV yesterday, he saw a special regarding the kissing bug. Patient wanted to be sure that the insect bite was not from the kissing bug so that he would not have any complications with his upcoming cardiac defibrillator surgery on May 3rd.     The history is provided by the patient and medical records.     Review of patient's allergies indicates:   Allergen Reactions    Iodine containing multivitamin Swelling     itching    Keflex [cephalexin] Swelling     Eyes.  Tolerated multiple doses of zosyn and 1 dose of augmentin in 2015 and 2016, respectively    Peaches [peach (prunus persica)] Swelling     eyes    Shellfish containing products Swelling    Fig tree Swelling     itching    Tuberculin spenser test ppd Rash     Past Medical History:   Diagnosis Date    AICD (automatic cardioverter/defibrillator) present 12/13/2015      Chronic anticoagulation 5/5/2016    Chronic combined systolic and diastolic heart failure 11/26/2012    EF 10-20% on ECHO 2013    Clotting disorder     Coronary artery disease involving native coronary artery of native heart without angina pectoris 11/26/2012    Cath 10- Stents patent non-obstructive disease Cath 11-12015 non-obstructive disease     Diverticulosis of colon     DVT (deep venous thrombosis), unspecified laterality 11/12/2015    Essential hypertension 11/15/2015    Hyperlipidemia     Hypertensive heart  disease with heart failure 2016    MI (myocardial infarction)     x's 5    Nicotine abuse     Obesity 2012    Pulmonary embolism     Renal disorder     LAVERNE    Stented coronary artery 2012    LAD stent placed 10/17/2007      Past Surgical History:   Procedure Laterality Date    APPENDECTOMY      BACK SURGERY      CARDIAC DEFIBRILLATOR PLACEMENT      CARDIAC SURGERY  2007    stent    CARPAL TUNNEL RELEASE Right 2018    Embolectomy Right 2017    [facility] 2ND [facility]    HEART CATH-LEFT Left 2015    [facility] by Britton [facility]repo [facility] at [facility] CATH LAB    HEART CATH-RIGHT Right 7/10/2017    Performed by Edison Marshall [facility] at [facility] CATH LAB    IRRIGATION AND DEBRIDEMENT UPPER EXTREMITY - LEFT INDEX FINGER Left 2016    Performed by Cornell [facility] at [facility] OR 2ND FLR    r knee scope      RELEASE-CARPAL TUNNEL right, right thumb TFR Right 2018    [facility] OR    RELEASE-FINGER-TRIGGER right thumb Right 2018    [facility] at [facility] OR    SPINE SURGERY      TONSILLECTOMY      TRIGGER FINGER RELEASE Right 2018    thumb    Venogram, EP Lab Bilateral 2019    Performed by Teofilo Pal [facility] at [facility] EP LAB     Family History   Problem Relation Age of Onset    Cancer Mother         colon cancer    Heart disease Mother     Diabetes Mother     Heart disease Father     Stroke Father     Colon polyps Father     Cancer Paternal Grandmother         leukemia    No Known Problems Sister     No Known Problems Daughter     No Known Problems Son     No Known Problems Sister     No Known Problems Son      Social History     Tobacco Use    Smoking status: Former Smoker     Packs/day: 1.00     Years: 45.00     Pack years: 45.00     Types: Cigarettes     Last attempt to quit: 2005     Years since quittin.3    Smokeless tobacco: Never Used     Tobacco comment: 1-1.5 ppd every day.   Substance Use Topics    Alcohol use: No     Frequency: Never    Drug use: No     Review of Systems   Constitutional: Negative for fever.   HENT: Negative for sore throat.    Respiratory: Negative for shortness of breath.    Cardiovascular: Negative for chest pain.   Gastrointestinal: Negative for abdominal pain.   Genitourinary: Negative for flank pain.   Musculoskeletal: Negative for neck pain.   Skin: Negative for rash.   Neurological: Negative for weakness.   Psychiatric/Behavioral: Negative for confusion.   All other systems reviewed and are negative.      Physical Exam     Initial Vitals [04/30/19 0922]   BP Pulse Resp Temp SpO2   125/72 70 18 97.5 °F (36.4 °C) 97 %      MAP       --         Physical Exam    Nursing note and vitals reviewed.  Constitutional: He appears well-developed and well-nourished.   HENT:   Head: Normocephalic and atraumatic.   Eyes: Pupils are equal, round, and reactive to light.   Neck: Normal range of motion.   Cardiovascular: Normal rate and regular rhythm.   Pulmonary/Chest: Breath sounds normal.   Abdominal: Soft. There is no tenderness.   Musculoskeletal: Normal range of motion. He exhibits no edema or tenderness.   Neurological: He is alert and oriented to person, place, and time.   Skin: Skin is warm and dry. No erythema.         ED Course   Procedures  Labs Reviewed - No data to display       Imaging Results    None          Medical Decision Making:   History:   Old Medical Records: I decided to obtain old medical records.  Initial Assessment:   68 y.o. male presents to the ED for concern of insect bite after TV commercial about kissing bugs. No evidence of bite yeni and physical exam is totally normal. I reassured the patient that if he did get an insect bite, the symptoms have since resolved. No  need for further testing. He can be discharged and follow up with his doctor for his scheduled surgery.            Scribe Attestation:    Scribe #1: I performed the above scribed service and the documentation accurately describes the services I performed. I attest to the accuracy of the note.               Clinical Impression:       ICD-10-CM ICD-9-CM   1. Encounter for medical screening examination Z13.9 V82.9         Disposition:   Disposition: Discharged  Condition: Stable                        Katarina Nova MD  04/30/19 1006

## 2019-05-01 ENCOUNTER — TELEPHONE (OUTPATIENT)
Dept: ELECTROPHYSIOLOGY | Facility: CLINIC | Age: 69
End: 2019-05-01

## 2019-05-03 ENCOUNTER — ANESTHESIA (OUTPATIENT)
Dept: MEDSURG UNIT | Facility: HOSPITAL | Age: 69
End: 2019-05-03
Payer: MEDICARE

## 2019-05-03 ENCOUNTER — HOSPITAL ENCOUNTER (OUTPATIENT)
Facility: HOSPITAL | Age: 69
Discharge: HOME | End: 2019-05-03
Attending: INTERNAL MEDICINE | Admitting: INTERNAL MEDICINE
Payer: MEDICARE

## 2019-05-03 ENCOUNTER — ANESTHESIA EVENT (OUTPATIENT)
Dept: MEDSURG UNIT | Facility: HOSPITAL | Age: 69
End: 2019-05-03
Payer: MEDICARE

## 2019-05-03 VITALS
WEIGHT: 220 LBS | SYSTOLIC BLOOD PRESSURE: 107 MMHG | DIASTOLIC BLOOD PRESSURE: 62 MMHG | OXYGEN SATURATION: 100 % | HEIGHT: 67 IN | TEMPERATURE: 97 F | HEART RATE: 72 BPM | RESPIRATION RATE: 18 BRPM | BODY MASS INDEX: 34.53 KG/M2

## 2019-05-03 DIAGNOSIS — I25.5 CARDIOMYOPATHY, ISCHEMIC: Primary | ICD-10-CM

## 2019-05-03 DIAGNOSIS — Z95.810 ICD (IMPLANTABLE CARDIOVERTER-DEFIBRILLATOR) IN PLACE: ICD-10-CM

## 2019-05-03 DIAGNOSIS — I44.7 LBBB (LEFT BUNDLE BRANCH BLOCK): ICD-10-CM

## 2019-05-03 PROCEDURE — 93005 ELECTROCARDIOGRAM TRACING: CPT

## 2019-05-03 PROCEDURE — D9220A PRA ANESTHESIA: ICD-10-PCS | Mod: CRNA,,, | Performed by: NURSE ANESTHETIST, CERTIFIED REGISTERED

## 2019-05-03 PROCEDURE — 33225 L VENTRIC PACING LEAD ADD-ON: CPT | Mod: ,,, | Performed by: INTERNAL MEDICINE

## 2019-05-03 PROCEDURE — 93010 ELECTROCARDIOGRAM REPORT: CPT | Mod: ,,, | Performed by: INTERNAL MEDICINE

## 2019-05-03 PROCEDURE — 25000003 PHARM REV CODE 250: Performed by: STUDENT IN AN ORGANIZED HEALTH CARE EDUCATION/TRAINING PROGRAM

## 2019-05-03 PROCEDURE — 33225 L VENTRIC PACING LEAD ADD-ON: CPT | Performed by: INTERNAL MEDICINE

## 2019-05-03 PROCEDURE — 25000003 PHARM REV CODE 250: Performed by: NURSE PRACTITIONER

## 2019-05-03 PROCEDURE — 33264 RMVL & RPLCMT DFB GEN MLT LD: CPT | Performed by: INTERNAL MEDICINE

## 2019-05-03 PROCEDURE — 93010 EKG 12-LEAD: ICD-10-PCS | Mod: 76,,, | Performed by: INTERNAL MEDICINE

## 2019-05-03 PROCEDURE — 33264 PR REMV&REPLC CVD GEN MULT LEAD: ICD-10-PCS | Mod: ,,, | Performed by: INTERNAL MEDICINE

## 2019-05-03 PROCEDURE — 63600175 PHARM REV CODE 636 W HCPCS

## 2019-05-03 PROCEDURE — 33225 PR INSRT PACING ELECT,AT INSERT NEW DEVICE: ICD-10-PCS | Mod: ,,, | Performed by: INTERNAL MEDICINE

## 2019-05-03 PROCEDURE — 37000009 HC ANESTHESIA EA ADD 15 MINS: Performed by: INTERNAL MEDICINE

## 2019-05-03 PROCEDURE — 25000003 PHARM REV CODE 250: Performed by: INTERNAL MEDICINE

## 2019-05-03 PROCEDURE — 25000003 PHARM REV CODE 250: Performed by: NURSE ANESTHETIST, CERTIFIED REGISTERED

## 2019-05-03 PROCEDURE — C1725 CATH, TRANSLUMIN NON-LASER: HCPCS | Performed by: INTERNAL MEDICINE

## 2019-05-03 PROCEDURE — C1882 AICD, OTHER THAN SING/DUAL: HCPCS | Performed by: INTERNAL MEDICINE

## 2019-05-03 PROCEDURE — 27201423 OPTIME MED/SURG SUP & DEVICES STERILE SUPPLY: Performed by: INTERNAL MEDICINE

## 2019-05-03 PROCEDURE — D9220A PRA ANESTHESIA: Mod: CRNA,,, | Performed by: NURSE ANESTHETIST, CERTIFIED REGISTERED

## 2019-05-03 PROCEDURE — 93010 ELECTROCARDIOGRAM REPORT: CPT | Mod: 76,,, | Performed by: INTERNAL MEDICINE

## 2019-05-03 PROCEDURE — 37000008 HC ANESTHESIA 1ST 15 MINUTES: Performed by: INTERNAL MEDICINE

## 2019-05-03 PROCEDURE — 63600175 PHARM REV CODE 636 W HCPCS: Performed by: NURSE ANESTHETIST, CERTIFIED REGISTERED

## 2019-05-03 PROCEDURE — D9220A PRA ANESTHESIA: Mod: ANES,,, | Performed by: ANESTHESIOLOGY

## 2019-05-03 PROCEDURE — D9220A PRA ANESTHESIA: ICD-10-PCS | Mod: ANES,,, | Performed by: ANESTHESIOLOGY

## 2019-05-03 PROCEDURE — 33264 RMVL & RPLCMT DFB GEN MLT LD: CPT | Mod: ,,, | Performed by: INTERNAL MEDICINE

## 2019-05-03 PROCEDURE — C1900 LEAD, CORONARY VENOUS: HCPCS | Performed by: INTERNAL MEDICINE

## 2019-05-03 PROCEDURE — C1730 CATH, EP, 19 OR FEW ELECT: HCPCS | Performed by: INTERNAL MEDICINE

## 2019-05-03 PROCEDURE — 63600175 PHARM REV CODE 636 W HCPCS: Performed by: NURSE PRACTITIONER

## 2019-05-03 PROCEDURE — A4216 STERILE WATER/SALINE, 10 ML: HCPCS | Performed by: NURSE ANESTHETIST, CERTIFIED REGISTERED

## 2019-05-03 PROCEDURE — C1769 GUIDE WIRE: HCPCS | Performed by: INTERNAL MEDICINE

## 2019-05-03 DEVICE — CARDIAC RESYNCHRONIZATION DEVICE, TIERED-THERAPY CARDIOVERTER/DEFIBRILLATOR VVED DDDRV
Type: IMPLANTABLE DEVICE | Site: HEART | Status: NON-FUNCTIONAL
Brand: QUADRA ASSURA MP™
Removed: 2022-09-27

## 2019-05-03 DEVICE — LEFT HEART LEAD
Type: IMPLANTABLE DEVICE | Site: HEART | Status: NON-FUNCTIONAL
Brand: QUARTET™
Removed: 2022-09-27

## 2019-05-03 RX ORDER — KETAMINE HYDROCHLORIDE 10 MG/ML
INJECTION, SOLUTION INTRAMUSCULAR; INTRAVENOUS
Status: DISCONTINUED | OUTPATIENT
Start: 2019-05-03 | End: 2019-05-03

## 2019-05-03 RX ORDER — SODIUM CHLORIDE 0.9 % (FLUSH) 0.9 %
10 SYRINGE (ML) INJECTION
Status: DISCONTINUED | OUTPATIENT
Start: 2019-05-03 | End: 2019-05-03 | Stop reason: HOSPADM

## 2019-05-03 RX ORDER — SODIUM CHLORIDE 9 MG/ML
INJECTION, SOLUTION INTRAVENOUS CONTINUOUS
Status: DISCONTINUED | OUTPATIENT
Start: 2019-05-03 | End: 2021-02-04

## 2019-05-03 RX ORDER — VANCOMYCIN HCL IN 5 % DEXTROSE 1G/250ML
1000 PLASTIC BAG, INJECTION (ML) INTRAVENOUS
Status: DISCONTINUED | OUTPATIENT
Start: 2019-05-03 | End: 2021-02-04

## 2019-05-03 RX ORDER — HYDROCODONE BITARTRATE AND ACETAMINOPHEN 10; 325 MG/1; MG/1
1 TABLET ORAL EVERY 8 HOURS PRN
Status: DISCONTINUED | OUTPATIENT
Start: 2019-05-03 | End: 2019-05-03 | Stop reason: HOSPADM

## 2019-05-03 RX ORDER — LIDOCAINE HYDROCHLORIDE 20 MG/ML
INJECTION, SOLUTION INFILTRATION; PERINEURAL
Status: DISCONTINUED | OUTPATIENT
Start: 2019-05-03 | End: 2019-05-03 | Stop reason: HOSPADM

## 2019-05-03 RX ORDER — ACETAMINOPHEN 325 MG/1
650 TABLET ORAL EVERY 4 HOURS PRN
Refills: 0 | COMMUNITY
Start: 2019-05-03 | End: 2019-08-05

## 2019-05-03 RX ORDER — MIDAZOLAM HYDROCHLORIDE 1 MG/ML
INJECTION, SOLUTION INTRAMUSCULAR; INTRAVENOUS
Status: DISCONTINUED | OUTPATIENT
Start: 2019-05-03 | End: 2019-05-03

## 2019-05-03 RX ORDER — FUROSEMIDE 40 MG/1
40 TABLET ORAL 2 TIMES DAILY
Status: DISCONTINUED | OUTPATIENT
Start: 2019-05-03 | End: 2019-05-03 | Stop reason: HOSPADM

## 2019-05-03 RX ORDER — FENTANYL CITRATE 50 UG/ML
INJECTION, SOLUTION INTRAMUSCULAR; INTRAVENOUS
Status: DISCONTINUED | OUTPATIENT
Start: 2019-05-03 | End: 2019-05-03

## 2019-05-03 RX ORDER — ERYTHROMYCIN 5 MG/G
OINTMENT OPHTHALMIC EVERY 8 HOURS
Status: DISCONTINUED | OUTPATIENT
Start: 2019-05-03 | End: 2019-05-03 | Stop reason: HOSPADM

## 2019-05-03 RX ORDER — PROPOFOL 10 MG/ML
VIAL (ML) INTRAVENOUS
Status: DISCONTINUED | OUTPATIENT
Start: 2019-05-03 | End: 2019-05-03

## 2019-05-03 RX ORDER — ACETAMINOPHEN 325 MG/1
650 TABLET ORAL EVERY 4 HOURS PRN
Status: DISCONTINUED | OUTPATIENT
Start: 2019-05-03 | End: 2019-05-03 | Stop reason: HOSPADM

## 2019-05-03 RX ORDER — PHENYLEPHRINE HYDROCHLORIDE 10 MG/ML
INJECTION INTRAVENOUS
Status: DISCONTINUED | OUTPATIENT
Start: 2019-05-03 | End: 2019-05-03

## 2019-05-03 RX ORDER — DOXYCYCLINE 100 MG/1
CAPSULE ORAL
Qty: 12 CAPSULE | Refills: 0 | Status: SHIPPED | OUTPATIENT
Start: 2019-05-03 | End: 2019-05-08

## 2019-05-03 RX ORDER — BUPIVACAINE HYDROCHLORIDE 2.5 MG/ML
INJECTION, SOLUTION EPIDURAL; INFILTRATION; INTRACAUDAL
Status: DISCONTINUED | OUTPATIENT
Start: 2019-05-03 | End: 2019-05-03 | Stop reason: HOSPADM

## 2019-05-03 RX ORDER — PROPOFOL 10 MG/ML
VIAL (ML) INTRAVENOUS CONTINUOUS PRN
Status: DISCONTINUED | OUTPATIENT
Start: 2019-05-03 | End: 2019-05-03

## 2019-05-03 RX ORDER — HYDROMORPHONE HYDROCHLORIDE 1 MG/ML
0.2 INJECTION, SOLUTION INTRAMUSCULAR; INTRAVENOUS; SUBCUTANEOUS EVERY 5 MIN PRN
Status: DISCONTINUED | OUTPATIENT
Start: 2019-05-03 | End: 2019-05-03 | Stop reason: HOSPADM

## 2019-05-03 RX ADMIN — KETAMINE HYDROCHLORIDE 10 MG: 10 INJECTION, SOLUTION INTRAMUSCULAR; INTRAVENOUS at 11:05

## 2019-05-03 RX ADMIN — KETAMINE HYDROCHLORIDE 10 MG: 10 INJECTION, SOLUTION INTRAMUSCULAR; INTRAVENOUS at 12:05

## 2019-05-03 RX ADMIN — VANCOMYCIN HYDROCHLORIDE 1000 MG: 1 INJECTION, POWDER, LYOPHILIZED, FOR SOLUTION INTRAVENOUS at 10:05

## 2019-05-03 RX ADMIN — PROPOFOL 50 MCG/KG/MIN: 10 INJECTION, EMULSION INTRAVENOUS at 10:05

## 2019-05-03 RX ADMIN — DEXMEDETOMIDINE HYDROCHLORIDE 0.5 MCG/KG/HR: 100 INJECTION, SOLUTION, CONCENTRATE INTRAVENOUS at 10:05

## 2019-05-03 RX ADMIN — PROPOFOL 50 MG: 10 INJECTION, EMULSION INTRAVENOUS at 10:05

## 2019-05-03 RX ADMIN — PHENYLEPHRINE HYDROCHLORIDE 100 MCG: 10 INJECTION INTRAVENOUS at 10:05

## 2019-05-03 RX ADMIN — VANCOMYCIN HYDROCHLORIDE 500 MG: 1 INJECTION, POWDER, LYOPHILIZED, FOR SOLUTION INTRAVENOUS at 10:05

## 2019-05-03 RX ADMIN — KETAMINE HYDROCHLORIDE 20 MG: 10 INJECTION, SOLUTION INTRAMUSCULAR; INTRAVENOUS at 11:05

## 2019-05-03 RX ADMIN — PROPOFOL 50 MG: 10 INJECTION, EMULSION INTRAVENOUS at 11:05

## 2019-05-03 RX ADMIN — MIDAZOLAM 1 MG: 1 INJECTION INTRAMUSCULAR; INTRAVENOUS at 10:05

## 2019-05-03 RX ADMIN — FENTANYL CITRATE 50 MCG: 50 INJECTION, SOLUTION INTRAMUSCULAR; INTRAVENOUS at 10:05

## 2019-05-03 RX ADMIN — ERYTHROMYCIN: 5 OINTMENT OPHTHALMIC at 05:05

## 2019-05-03 RX ADMIN — FLUORESCEIN SODIUM AND BENOXINATE HYDROCHLORIDE: 4; 2.5 SOLUTION OPHTHALMIC at 04:05

## 2019-05-03 RX ADMIN — SODIUM CHLORIDE: 0.9 INJECTION, SOLUTION INTRAVENOUS at 10:05

## 2019-05-03 NOTE — PLAN OF CARE
Pt discharged via wheelchair in care of sister,Vs stable.Left chest wall with dermabond CD&I.No hematoma noted.Discharge instructions given and verbalized understanding.

## 2019-05-03 NOTE — PROGRESS NOTES
Patient admitted to recovery see [facility] for complete assessment pacu bcg' smaintained safety measures verified patient instructed on pain scale and also called for ekg and called for chest xray also called patient's sister and updated on patient location.

## 2019-05-03 NOTE — Clinical Note
A venogram was performed in the coronary sinus. The vessel was injected with hand injection  with 8 mL of contrast.

## 2019-05-03 NOTE — NURSING TRANSFER
Nursing Transfer Note      5/3/2019     Transfer To: short stay 10    Transfer via stretcher    Transfer with cardiac monitoring    Transported by reginald hanna    Medicines sent: none    Chart send with patient: Yes    Notified: reported to nurse plaza    Patient reassessed at: see epic (date, time)    Upon arrival to floor: to room no complaints no distress noted.

## 2019-05-03 NOTE — BRIEF OP NOTE
Patient is s/p SJM BIV upgrade :   Tolerated procedure well. No acute complication noted.  Post op care per protocol.  Will monitor in recovery on tele   Vancomycin 1.5 gm q12 hrs for 2 doses   NO HEPARIN PRODUCTS  Doxycycline 100 mg bid for 5 days at discharge  Dressing will be removed tomorrow  Chest Xray (ordered)    Other instruction:   ==============================  Sling to left arm - wear for 48 hours, then only at night for 6 weeks.  No lifting left elbow above shoulder height  No lifting over 5 pounds  No driving for 1 week and for 4 weeks if patient uses left arm to make turns  Do not let beam of shower/water hit site directly and no scrubbing in area  Follow up in device clinic in 1 week and with Ep clinic  in 3 months.  Notify Cardiology/EP increased redness, warmth, drainage, or re-opening of the wound   Please call [phone] option 1 during business hours or the main [facility] number and ask for on-call for device clinic after hours.

## 2019-05-03 NOTE — ANESTHESIA PROCEDURE NOTES
Post Anesthesia Patient Management  Reason for Note: I returned to PACU bedside to examine the patient.     CARDIOVASCULAR:      Additional Notes:   Patient with L eye pain, feels like sand in his eyes.   Upon evaluation, not able to see any abrasion.   Applied Fluorescein dye with benoxinate.   Patient had resolution of pain with drops.  No obvious abrasion noted, however no ophthalmoscope available.   Will prescribe erythromycin ointment, to be applied q8h for 3 days. Patient to contact ophthalmology if pain does not resolve.

## 2019-05-03 NOTE — Clinical Note
Generator Pocket the left  upper chest  with blunt dissection, electrocautery, plasma blade and sharp dissection.

## 2019-05-03 NOTE — TRANSFER OF CARE
"Anesthesia Transfer of Care Note    Patient: Audrey Oneil Jr.    Procedure(s) Performed: Procedure(s) (LRB):  INSERTION, ICD, BIVENTRICULAR (N/A)  Revision, Skin Pocket, For Cardioverter-Defibrillator (Left)    Patient location: PACU    Anesthesia Type: general    Transport from OR: Transported from OR on 100% O2 by closed face mask with adequate spontaneous ventilation    Post pain: adequate analgesia    Post assessment: no apparent anesthetic complications and tolerated procedure well    Post vital signs: stable    Level of consciousness: responds to stimulation and sedated    Nausea/Vomiting: no nausea/vomiting    Complications: none    Transfer of care protocol was followed      Last vitals:   Visit Vitals  BP (!) 116/59 (BP Location: Right arm, Patient Position: Lying)   Pulse 76   Temp 36.3 °C (97.3 °F) (Oral)   Resp 18   Ht 5' 7" (1.702 m)   Wt 99.8 kg (220 lb)   SpO2 (!) 92%   BMI 34.46 kg/m²     "

## 2019-05-03 NOTE — ANESTHESIA PREPROCEDURE EVALUATION
05/03/2019  Pre-operative evaluation for Procedure(s) (LRB):  INSERTION, ICD, BIVENTRICULAR (N/A)    Audrey Oneil Jr. is a 68 y.o. male CAD (PCI 2007 LAD), PE (2010), HTN, s/p ICD with EF 20%    · Severely decreased left ventricular systolic function. The estimated ejection fraction is 20%  · Eccentric left ventricular hypertrophy.  · Global hypokinetic wall motion.  · Severe left atrial enlargement.  · Septal wall has abnormal motion.  · Grade I (mild) left ventricular diastolic dysfunction consistent with impaired relaxation.  · Normal left atrial pressure.  · Normal right ventricular systolic function.  · Normal central venous pressure (3 mm Hg).    Patient Active Problem List   Diagnosis    Coronary artery disease involving native coronary artery of native heart without angina pectoris    Chronic combined systolic and diastolic congestive heart failure    Severe obesity (BMI 35.0-39.9) with comorbidity    Cardiomyopathy, ischemic    Hx pulmonary embolism 2007 (estimate)    History of DVT (deep vein thrombosis)    Essential hypertension    ICD (implantable cardioverter-defibrillator) in place    Hypertensive heart disease with heart failure    Hyperlipidemia LDL goal <70    Atherosclerosis of aorta    Anemia of chronic disease    Idiopathic chronic gout of left foot without tophus    Right hand pain    Prediabetes    Neck pain, bilateral posterior    Dysphonia    Trigger thumb of right hand    Right carpal tunnel syndrome    LBBB (left bundle branch block)    Elevated troponin    SOB (shortness of breath)    Chest pain    Cellulitis of right upper extremity       Review of patient's allergies indicates:   Allergen Reactions    Iodine containing multivitamin Swelling     itching    Keflex [cephalexin] Swelling     Eyes.  Tolerated multiple doses of zosyn and 1 dose of augmentin  in 2015 and 2016, respectively    Peaches [peach (prunus persica)] Swelling     eyes    Shellfish containing products Swelling    Fig tree Swelling     itching    Tuberculin spenser test ppd Rash       No current facility-administered medications on file prior to encounter.      Current Outpatient Medications on File Prior to Encounter   Medication Sig Dispense Refill    aspirin (ECOTRIN) 325 MG EC tablet Take 325 mg by mouth once daily.      atorvastatin (LIPITOR) 80 MG tablet TAKE 1 TABLET(80 MG) BY MOUTH EVERY DAY 90 tablet 3    cimetidine (TAGAMET) 300 MG tablet Take 1 tablet at  11 pm on night before your procedure (5-2-19), then 1 tab at 5 am and 11 am on day of procedure (5-3-19). 3 tablet 0    clopidogrel (PLAVIX) 75 mg tablet Take 1 tablet (75 mg total) by mouth once daily. 90 tablet 3    COLCRYS 0.6 mg tablet TAKE 2 TABLETS BY MOUTH FOR 1 DAY THEN TAKE 1 TABLET BY MOUTH EVERY DAY THEREAFTER AS NEEDED 20 tablet 0    diphenhydrAMINE (BENADRYL) 50 MG capsule Take 1 capsule (50 mg total) by mouth as needed for Allergies (Take 50 mg at 13 hours prior, 7 hours prior, and 1 hour prior to procedure). 3 capsule 0    docusate sodium (COLACE) 50 MG capsule Take 1 capsule (50 mg total) by mouth 2 (two) times daily. (Patient taking differently: Take 50 mg by mouth 2 (two) times daily as needed. ) 30 capsule 0    HYDROcodone-acetaminophen (NORCO)  mg per tablet Take 1 tablet by mouth every 6 (six) hours as needed for Pain.      predniSONE (DELTASONE) 50 MG Tab Take 1 tablet at  11 pm on night before your procedure (5-2-19), then 1 tab at 5 am and 11 am on day of procedure (5-3-19). 3 tablet 0       Past Surgical History:   Procedure Laterality Date    APPENDECTOMY      BACK SURGERY      CARDIAC DEFIBRILLATOR PLACEMENT      CARDIAC SURGERY  2007    stent    CARPAL TUNNEL RELEASE Right 04/2018    Embolectomy Right 11/26/2017    Performed by Low White [facility] at [facility] OR [address] [place] [address]    HEART CATH-LEFT  Left 2015    [facility] CATH LAB    HEART CATH-RIGHT Right 7/10/2017    [facility] CATH LAB    IRRIGATION AND DEBRIDEMENT UPPER EXTREMITY - LEFT INDEX FINGER Left 2016    [facility] by Cornell [facility] at [facility] OR 2ND FLR    r knee scope      RELEASE-CARPAL TUNNEL right, right thumb TFR Right 2018    [facility] OR    RELEASE-FINGER-TRIGGER right thumb Right 2018    [facility] at [facility] OR    SPINE SURGERY      TONSILLECTOMY      TRIGGER FINGER RELEASE Right 2018    thumb    Venogram, EP Lab Bilateral 2019    [facility]ilo Pal [facility] at [facility] EP LAB       Social History     Socioeconomic History    Marital status:      Spouse name: Not on file    Number of children: 2    Years of education: Not on file    Highest education level: Not on file   Occupational History    Occupation: [employer]     Comment: unemployed   Social Needs    Financial resource strain: Not on file    Food insecurity:     Worry: Not on file     Inability: Not on file    Transportation needs:     Medical: Not on file     Non-medical: Not on file   Tobacco Use    Smoking status: Former Smoker     Packs/day: 1.00     Years: 45.00     Pack years: 45.00     Types: Cigarettes     Last attempt to quit: 2005     Years since quittin.3    Smokeless tobacco: Never Used    Tobacco comment: 1-1.5 ppd every day.   Substance and Sexual Activity    Alcohol use: No     Frequency: Never    Drug use: No    Sexual activity: Never   Lifestyle    Physical activity:     Days per week: Not on file     Minutes per session: Not on file    Stress: Not on file   Relationships    Social connections:     Talks on phone: Not on file     Gets together: Not on file     Attends Yarsani service: Not on file     Active member of club or organization: Not on file     Attends  meetings of clubs or organizations: Not on file     Relationship status: Not on file   Other Topics Concern    Not on file   Social History Narrative    Not on file         CBC: No results for input(s): WBC, RBC, HGB, HCT, PLT, MCV, MCH, MCHC in the last 72 hours.    CMP: No results for input(s): NA, K, CL, CO2, BUN, CREATININE, GLU, MG, PHOS, CALCIUM, ALBUMIN, PROT, ALKPHOS, ALT, AST, BILITOT in the last 72 hours.    INR  No results for input(s): PT, INR, PROTIME, APTT in the last 72 hours.        Diagnostic Studies:      EKG:  Normal sinus rhythm  Left axis deviation  Left bundle branch block  Abnormal ECG  When compared with ECG of 16-AUG-2018 08:31,  Left bundle branch block has replaced Nonspecific intraventricular block  Confirmed by JENNIFER CHURCHILL MD ([id number]) on 8/29/2018 4:08:31 PM    2D Echo:  Results for orders placed or performed during the hospital encounter of 04/27/18   2D echo with color flow doppler   Result Value Ref Range    QEF 20 (A) 55 - 65    Mitral Valve Regurgitation TRIVIAL     Diastolic Dysfunction Yes (A)     Est. PA Systolic Pressure 25.28     Tricuspid Valve Regurgitation TRIVIAL TO MILD          Anesthesia Evaluation    I have reviewed the Patient Summary Reports.    I have reviewed the Nursing Notes.   I have reviewed the Medications.     Review of Systems  Anesthesia Hx:  No problems with previous Anesthesia  History of prior surgery of interest to airway management or planning: Denies Family Hx of Anesthesia complications.   Denies Personal Hx of Anesthesia complications.   Hematology/Oncology:         -- Denies Anemia:   Cardiovascular:   Exercise tolerance: poor Pacemaker Hypertension CAD  CABG/stent Dysrhythmias  CHF HENSLEY ECG has been reviewed.    Pulmonary:   Denies COPD.  Denies Asthma.  Denies Sleep Apnea.    Renal/:   Chronic Renal Disease    Hepatic/GI:   Denies GERD. Denies Liver Disease.    Neurological:   Denies CVA. Denies Seizures.    Endocrine:   Denies Diabetes.         Physical Exam  General:  Well nourished    Airway/Jaw/Neck:  Airway Findings: Mouth Opening: Normal Tongue: Normal  General Airway Assessment: Adult  Mallampati: III  Improves to III with phonation.  TM Distance: Normal, at least 6 cm  Jaw/Neck Findings:  Neck ROM: Normal ROM      Dental:  Dental Findings: Periodontal disease, Severe    Chest/Lungs:  Chest/Lungs Findings: Clear to auscultation, Normal Respiratory Rate     Heart/Vascular:  Heart Findings: Rate: Normal  Rhythm: Regular Rhythm  Sounds: Normal        Mental Status:  Mental Status Findings:  Cooperative, Alert and Oriented         Anesthesia Plan  Type of Anesthesia, risks & benefits discussed:  Anesthesia Type:  general  Patient's Preference:   Intra-op Monitoring Plan: standard ASA monitors  Intra-op Monitoring Plan Comments:   Post Op Pain Control Plan: per primary service following discharge from PACU  Post Op Pain Control Plan Comments:   Induction:   IV  Beta Blocker:  Patient is on a Beta-Blocker and has received one dose within the past 24 hours (No further documentation required).       Informed Consent: Patient understands risks and agrees with Anesthesia plan.  Questions answered.   ASA Score: 3     Day of Surgery Review of History & Physical:    H&P update referred to the provider.         Ready For Surgery From Anesthesia Perspective.

## 2019-05-03 NOTE — Clinical Note
Prepped: groin, chest and abdomen. Prepped with: ChloraPrep. The site was clipped. The patient was draped.

## 2019-05-03 NOTE — H&P
Subjective:   Patient ID:  Audrey Oneil Jr. is a 68 y.o. male     Chief complaint:No chief complaint on file.      HPI    Background from Dr Marshall's note (1/12/18):  ===========================================  Mr. Oneil hx of CAD s/p STEMI (s/p PCI LAD 2007), CHF/ICM and remote MI, quit smoking Dec 2015,  PE (2010, s/p 1-yr coumadin), HTN, DLP and s/p ICD [employer] placement due to VT . CPX (2/15/17) that showed low Peak VO2 of 7.5ml/kg/min but AT was not attained and RER was only 0.67 (poor effort). Most recent 2D echo showed severely depressed LV function with an EF=10-15%, LV with a LVEDD of 5.9  cm, normal RV size and function. Clinically reports NYHA class II-III symptoms.     Update today  ========================  He has a LBBB that started developing in 11/2015 and with slowly gradually widening QRS duration (124 in 11/2015 and now 150). His EF however has been as low .   I have reviewed the actual image of the ECG tracing obtained today and it shows NSR with LBBB and QRSd 150  NYHA III      Successful venogram 2/19  · Patent L central circulation around existing leads      Echo 1/19:  · Severely decreased left ventricular systolic function. The estimated ejection fraction is 20%  · Eccentric left ventricular hypertrophy.  · Global hypokinetic wall motion.  · Severe left atrial enlargement.  · Septal wall has abnormal motion.  ·   No current facility-administered medications on file prior to encounter.      Current Outpatient Medications on File Prior to Encounter   Medication Sig Dispense Refill    atorvastatin (LIPITOR) 80 MG tablet TAKE 1 TABLET(80 MG) BY MOUTH EVERY DAY (Patient taking differently: TAKE 1 TABLET(80 MG) BY MOUTH EVERY NIGHT) 90 tablet 3    cimetidine (TAGAMET) 300 MG tablet Take 1 tablet at  11 pm on night before your procedure (5-2-19), then 1 tab at 5 am and 11 am on day of procedure (5-3-19). 3 tablet 0    clopidogrel (PLAVIX) 75 mg tablet Take 1 tablet (75 mg total) by mouth  once daily. (Patient taking differently: Take 75 mg by mouth every evening. ) 90 tablet 3    COLCRYS 0.6 mg tablet TAKE 2 TABLETS BY MOUTH FOR 1 DAY THEN TAKE 1 TABLET BY MOUTH EVERY DAY THEREAFTER AS NEEDED 20 tablet 0    diphenhydrAMINE (BENADRYL) 50 MG capsule Take 1 capsule (50 mg total) by mouth as needed for Allergies (Take 50 mg at 13 hours prior, 7 hours prior, and 1 hour prior to procedure). 3 capsule 0    HYDROcodone-acetaminophen (NORCO)  mg per tablet Take 1 tablet by mouth every 6 (six) hours as needed for Pain.      predniSONE (DELTASONE) 50 MG Tab Take 1 tablet at  11 pm on night before your procedure (5-2-19), then 1 tab at 5 am and 11 am on day of procedure (5-3-19). 3 tablet 0    aspirin (ECOTRIN) 325 MG EC tablet Take 325 mg by mouth every evening.       docusate sodium (COLACE) 50 MG capsule Take 1 capsule (50 mg total) by mouth 2 (two) times daily. (Patient taking differently: Take 50 mg by mouth 2 (two) times daily as needed. ) 30 capsule 0     Current Facility-Administered Medications   Medication    0.9%  NaCl infusion    HYDROmorphone injection 0.2 mg    sodium chloride 0.9% flush 10 mL    vancomycin in dextrose 5 % 1 gram/250 mL IVPB 1,000 mg     Review of Systems   Constitution: Positive for malaise/fatigue. Negative for decreased appetite, weight gain and weight loss.   Eyes: Negative for blurred vision.   Cardiovascular: Negative for chest pain, claudication, cyanosis, dyspnea on exertion, irregular heartbeat, leg swelling, near-syncope, orthopnea and palpitations.   Respiratory: Negative for cough, shortness of breath, sleep disturbances due to breathing, snoring and wheezing.    Endocrine: Negative for heat intolerance.   Hematologic/Lymphatic: Does not bruise/bleed easily.   Musculoskeletal: Negative for muscle weakness and myalgias.   Gastrointestinal: Negative for melena, nausea and vomiting.   Genitourinary: Negative for nocturia.   Neurological: Negative for  excessive daytime sleepiness, dizziness, headaches, light-headedness and weakness.   Psychiatric/Behavioral: Negative for depression, memory loss and substance abuse. The patient does not have insomnia and is not nervous/anxious.        Objective:   Physical Exam   Constitutional: He is oriented to person, place, and time. He appears well-developed and well-nourished.   overweight   HENT:   Head: Normocephalic and atraumatic.   Right Ear: External ear normal.   Left Ear: External ear normal.   Eyes: Pupils are equal, round, and reactive to light. Conjunctivae are normal. Left conjunctiva is not injected. Left conjunctiva has no hemorrhage.   Neck: Neck supple. No JVD present. No thyromegaly present.   Cardiovascular: Normal rate, regular rhythm, normal heart sounds and intact distal pulses. PMI is not displaced. Exam reveals no gallop, no friction rub, no midsystolic click and no opening snap.   No murmur heard.  Pulses:       Carotid pulses are 2+ on the right side, and 2+ on the left side.       Radial pulses are 2+ on the right side, and 2+ on the left side.        Dorsalis pedis pulses are 2+ on the right side, and 2+ on the left side.        Posterior tibial pulses are 2+ on the right side, and 2+ on the left side.   Pulmonary/Chest: Effort normal and breath sounds normal. No respiratory distress. He has no wheezes. He has no rales. He exhibits no tenderness.   Device pocket is in excellent repair     Abdominal: Soft. Normal appearance. He exhibits no pulsatile liver. There is no hepatomegaly. There is no tenderness. There is no rigidity and no guarding.   Obese abdomen   Musculoskeletal: Normal range of motion. He exhibits no edema or tenderness.        Right knee: He exhibits no swelling.        Left knee: He exhibits no swelling.        Right ankle: He exhibits no swelling.        Left ankle: He exhibits no swelling.        Right lower leg: He exhibits no swelling.        Left lower leg: He exhibits no  "swelling.        Right foot: There is no swelling.        Left foot: There is no swelling.   Neurological: He is alert and oriented to person, place, and time. He has normal strength and normal reflexes. No cranial nerve deficit. Coordination normal.   Skin: Skin is warm, dry and intact. No rash noted. No cyanosis. No pallor.   Psychiatric: He has a normal mood and affect. His behavior is normal.   Nursing note and vitals reviewed.    BP (!) 116/59 (BP Location: Right arm, Patient Position: Lying)   Pulse 76   Temp 97.3 °F (36.3 °C) (Oral)   Resp 18   Ht 5' 7" (1.702 m)   Wt 99.8 kg (220 lb)   SpO2 (!) 92%   BMI 34.46 kg/m²      Assessment:      1. Cardiomyopathy, ischemic    2. ICD (implantable cardioverter-defibrillator) in place        Plan:       CRTD upgrade because of symptomatic HF NYHA III + persistant  low EF and LBBB :   We discussed the procedural details, risks and benefits and alternatives of the device implantation with the patient, including but not limited to the risk of infection, pocket hematoma, lead dislodgement, pneumothorax, cardiac perforation, lead malfunction, and in very rare cases, death.  Patient  appeared to understand the whole discussion and verbalized that all questions were answered to satisfaction. After deliberating over the options, explanation, and risks/ benefits of the procedure, patient wishes to move forward with the procedure. .    Iodine allergy - premedicated.                "

## 2019-05-03 NOTE — ADDENDUM NOTE
Addendum  created 05/03/19 3728 by Arely Cardoza MD    Child order released for a procedure order, Order list changed, Sign clinical note

## 2019-05-03 NOTE — PLAN OF CARE
Problem: Adult Inpatient Plan of Care  Goal: Plan of Care Review  Outcome: Ongoing (interventions implemented as appropriate)  Received report from Goldie. Patient s/p ICD, AAOx3. VSS, no c/o pain or discomfort at this time, resp even and unlabored. L upper chest wall ZOEY c dermabond is CDI. No active bleeding. No hematoma noted. Post procedure protocol reviewed with patient. Understanding verbalized. Nurse call bell within reach. Will continue to monitor per post procedure protocol.

## 2019-05-03 NOTE — DISCHARGE SUMMARY
[facility]  Discharge Summary      Admit Date: 5/3/2019    Discharge Date and Time: 5/3/2019  6:07 PM    Attending Physician: No att. providers found     Reason for Admission: BIV upgrade     Procedures Performed: Procedure(s) (LRB):  INSERTION, ICD, BIVENTRICULAR (N/A)  Revision, Skin Pocket, For Cardioverter-Defibrillator (Left)    Hospital Course     Patient is s/p SJM BIV upgrade :   Tolerated procedure well. No acute complication noted.  No events in recovery   Doxycycline 100 mg bid for 5 days at discharge  Surgical site is appropriate healing.   Overall he is feeling good.       Final Diagnoses:    Principal Problem: <principal problem not specified>   Secondary Diagnoses:   Active Hospital Problems    Diagnosis  POA    Cardiomyopathy, ischemic [I25.5]  Yes     Chronic      Resolved Hospital Problems   No resolved problems to display.       Discharged Condition: stable    Disposition: Home or Self Care    Follow Up/Patient Instructions:     Medications:  Reconciled Home Medications:      Medication List      START taking these medications    acetaminophen 325 MG tablet  Commonly known as:  TYLENOL  Take 2 tablets (650 mg total) by mouth every 4 (four) hours as needed (pain 1-4/10 pain scale).     doxycycline 100 MG Cap  Commonly known as:  VIBRAMYCIN  Take 2 capsules (200 mg total) by mouth every 12 (twelve) hours for 1 day, THEN 1 capsule (100 mg total) every 12 (twelve) hours for 4 days.  Start taking on:  5/3/2019        CHANGE how you take these medications    allopurinol 100 MG tablet  Commonly known as:  ZYLOPRIM  TAKE 1 TABLET(100 MG) BY MOUTH EVERY DAY  What changed:  additional instructions     amiodarone 100 MG Tab  Commonly known as:  PACERONE  Take 1 tablet (100 mg total) by mouth once daily.  What changed:  when to take this     atorvastatin 80 MG tablet  Commonly known as:  LIPITOR  TAKE 1 TABLET(80 MG) BY MOUTH EVERY DAY  What changed:  additional instructions      clopidogrel 75 mg tablet  Commonly known as:  PLAVIX  Take 1 tablet (75 mg total) by mouth once daily.  What changed:  when to take this     docusate sodium 50 MG capsule  Commonly known as:  COLACE  Take 1 capsule (50 mg total) by mouth 2 (two) times daily.  What changed:    · when to take this  · reasons to take this     furosemide 40 MG tablet  Commonly known as:  LASIX  Take 1 tablet (40 mg total) by mouth 2 (two) times daily.  What changed:    · how much to take  · when to take this     metoprolol succinate 50 MG 24 hr tablet  Commonly known as:  TOPROL-XL  Take 1 tablet (50 mg total) by mouth once daily.  What changed:  when to take this        CONTINUE taking these medications    aspirin 325 MG EC tablet  Commonly known as:  ECOTRIN  Take 325 mg by mouth every evening.     HYDROcodone-acetaminophen  mg per tablet  Commonly known as:  NORCO  Take 1 tablet by mouth every 6 (six) hours as needed for Pain.        STOP taking these medications    cimetidine 300 MG tablet  Commonly known as:  TAGAMET     diphenhydrAMINE 50 MG capsule  Commonly known as:  BENADRYL     predniSONE 50 MG Tab  Commonly known as:  DELTASONE        ASK your doctor about these medications    COLCRYS 0.6 mg tablet  Generic drug:  colchicine  TAKE 2 TABLETS BY MOUTH FOR 1 DAY THEN TAKE 1 TABLET BY MOUTH EVERY DAY THEREAFTER AS NEEDED          Discharge Procedure Orders   Diet Cardiac     No driving until:   Order Comments: Other instruction:   ==============================  Antibiotics as above for 5 days   Sling to left arm - wear for 48 hours, then only at night for 4 weeks.  No lifting left elbow above shoulder height   No lifting over 5 pounds  No driving for 1 week and for 4 weeks if patient uses left arm to make turns  Do not let beam of shower/water hit site directly and no scrubbing in area  Follow up in device clinic in 1 week and with Ep clinic  in 3 months.  Notify Cardiology/EP increased redness, warmth, drainage, or  re-opening of the wound   Please call [phone] option 1 during business hours or the main [facility] number and ask for on-call for device clinic after hours.     Notify your health care provider if you experience any of the following:  temperature >100.4     Notify your health care provider if you experience any of the following:  persistent nausea and vomiting or diarrhea     Notify your health care provider if you experience any of the following:  severe uncontrolled pain     Notify your health care provider if you experience any of the following:  redness, tenderness, or signs of infection (pain, swelling, redness, odor or green/yellow discharge around incision site)     Notify your health care provider if you experience any of the following:  difficulty breathing or increased cough     Notify your health care provider if you experience any of the following:  severe persistent headache     Notify your health care provider if you experience any of the following:  worsening rash     Notify your health care provider if you experience any of the following:  persistent dizziness, light-headedness, or visual disturbances     Notify your health care provider if you experience any of the following:  increased confusion or weakness     No dressing needed     Follow-up Information     [facility] - Arrhythmia In 1 week.    Specialty:  Cardiology  Why:  for wound check post device upgrade  Contact information:  [address]  [facility] [address] [phone]  Additional information:  [facility] - 3rd Floor           Teofilo Pal MD In 3 months.    Specialties:  Electrophysiology, Cardiology  Why:  post device implantation .  Contact information:  [address]  [facility] [id number] [phone]

## 2019-05-03 NOTE — Clinical Note
A venogram was performed in the coronary sinus. The vessel was injected with hand injection  with 4 mL of contrast.

## 2019-05-03 NOTE — ANESTHESIA POSTPROCEDURE EVALUATION
Anesthesia Post Evaluation    Patient: Audrey Oneil     Procedure(s) Performed: Procedure(s) (LRB):  INSERTION, ICD, BIVENTRICULAR (N/A)  Revision, Skin Pocket, For Cardioverter-Defibrillator (Left)    Final Anesthesia Type: general  Patient location during evaluation: PACU  Patient participation: Yes- Able to Participate  Level of consciousness: awake and alert and oriented  Post-procedure vital signs: reviewed and stable  Pain management: adequate  Airway patency: patent  PONV status at discharge: No PONV  Anesthetic complications: no      Cardiovascular status: blood pressure returned to baseline, hemodynamically stable and stable  Respiratory status: unassisted, room air and spontaneous ventilation  Hydration status: euvolemic  Follow-up not needed.          Vitals Value Taken Time   /62 5/3/2019  3:06 PM   Temp 35.8 °C (96.4 °F) 5/3/2019  3:06 PM   Pulse 66 5/3/2019  3:06 PM   Resp 18 5/3/2019  3:06 PM   SpO2 100 % 5/3/2019  3:06 PM         Event Time     Out of Recovery 14:59:36          Pain/Magaly Score: Magaly Score: 10 (5/3/2019  2:59 PM)

## 2019-05-06 ENCOUNTER — TELEPHONE (OUTPATIENT)
Dept: ELECTROPHYSIOLOGY | Facility: CLINIC | Age: 69
End: 2019-05-06

## 2019-05-06 ENCOUNTER — DOCUMENTATION ONLY (OUTPATIENT)
Dept: ELECTROPHYSIOLOGY | Facility: CLINIC | Age: 69
End: 2019-05-06

## 2019-05-06 NOTE — PROGRESS NOTES
notified that patient was told to go to the ER.    MD called patient  and asked him to come to clinic to see MD.     patient on way to see MD in clinic.      Jessie TALAMANTES CCM

## 2019-05-06 NOTE — TELEPHONE ENCOUNTER
----- Message from Rebeccafranck Fernandez sent at 5/6/2019 11:15 AM CDT -----  Contact: Patient's sister Clarissa      ----- Message -----  From: Vandana Vidal  Sent: 5/6/2019   9:12 AM  To: Kae GEORGE Staff    The Pt's sister is calling to report that the Pt is having upper right quadrant with pain when moving and he also has swelling under the site. Please call her back @ [phone]. Thanks, Vandana - The doctor told her that he wanted to see the Pt before Friday.

## 2019-05-06 NOTE — TELEPHONE ENCOUNTER
Spoke with patient's sister- Riana. Patient is S/P ICD procedure with . Riana reports that incision looks really good, Denies redness, denies swelling to the incision, no leaking from incision, no redness at incision site.    Swelling noted to left shoulder and left arm pit area. Patient in excruciating pain/ When patient barely moves arm he is screaming in pain.     Spoke with .  would like for the patient to come in to the device clinic to have area evaluated on today.    Spoke with Columba TALAMANTES in Device- Columba TALAMANTES feels patient would be better suited with visit to see Gertrudis CHOI.    Discussed case with Gertrudis CHOI- she would like for patient to go to the ER for further evaluation.    Notified Milena TALAMANTES of above.    Spoke with --Advised patient needs to go to the ER for evaluation.     Jessie TALAMANTES CCM

## 2019-05-10 ENCOUNTER — CLINICAL SUPPORT (OUTPATIENT)
Dept: CARDIOLOGY | Facility: HOSPITAL | Age: 69
End: 2019-05-10
Attending: INTERNAL MEDICINE
Payer: MEDICARE

## 2019-05-10 DIAGNOSIS — Z95.810 AICD (AUTOMATIC CARDIOVERTER/DEFIBRILLATOR) PRESENT: ICD-10-CM

## 2019-05-10 DIAGNOSIS — I25.5 ISCHEMIC CARDIOMYOPATHY: ICD-10-CM

## 2019-05-10 PROCEDURE — 93284 PRGRMG EVAL IMPLANTABLE DFB: CPT

## 2019-05-13 ENCOUNTER — TELEPHONE (OUTPATIENT)
Dept: CARDIOLOGY | Facility: HOSPITAL | Age: 69
End: 2019-05-13

## 2019-05-13 NOTE — TELEPHONE ENCOUNTER
----- Message from Lj Triana RN sent at 5/10/2019 10:01 AM CDT -----  Patient needs a 3 mos ekg/device/md appt, s/p crtd upgrade.  He is a [facility] patient.  Has no preference for MD or time of day.  States to just schedule him with someone and mail appt to him.  He has a SJM device.

## 2019-05-29 ENCOUNTER — OFFICE VISIT (OUTPATIENT)
Dept: INTERNAL MEDICINE | Facility: CLINIC | Age: 69
End: 2019-05-29
Payer: MEDICARE

## 2019-05-29 VITALS
DIASTOLIC BLOOD PRESSURE: 60 MMHG | BODY MASS INDEX: 34.12 KG/M2 | OXYGEN SATURATION: 94 % | TEMPERATURE: 99 F | HEART RATE: 77 BPM | SYSTOLIC BLOOD PRESSURE: 110 MMHG | HEIGHT: 67 IN | WEIGHT: 217.38 LBS

## 2019-05-29 DIAGNOSIS — J40 BRONCHITIS: Primary | ICD-10-CM

## 2019-05-29 PROCEDURE — 3078F PR MOST RECENT DIASTOLIC BLOOD PRESSURE < 80 MM HG: ICD-10-PCS | Mod: CPTII,S$GLB,, | Performed by: INTERNAL MEDICINE

## 2019-05-29 PROCEDURE — 3074F PR MOST RECENT SYSTOLIC BLOOD PRESSURE < 130 MM HG: ICD-10-PCS | Mod: CPTII,S$GLB,, | Performed by: INTERNAL MEDICINE

## 2019-05-29 PROCEDURE — 99213 PR OFFICE/OUTPT VISIT, EST, LEVL III, 20-29 MIN: ICD-10-PCS | Mod: S$GLB,,, | Performed by: INTERNAL MEDICINE

## 2019-05-29 PROCEDURE — 3078F DIAST BP <80 MM HG: CPT | Mod: CPTII,S$GLB,, | Performed by: INTERNAL MEDICINE

## 2019-05-29 PROCEDURE — 99999 PR PBB SHADOW E&M-EST. PATIENT-LVL III: CPT | Mod: PBBFAC,,, | Performed by: INTERNAL MEDICINE

## 2019-05-29 PROCEDURE — 1101F PT FALLS ASSESS-DOCD LE1/YR: CPT | Mod: CPTII,S$GLB,, | Performed by: INTERNAL MEDICINE

## 2019-05-29 PROCEDURE — 99999 PR PBB SHADOW E&M-EST. PATIENT-LVL III: ICD-10-PCS | Mod: PBBFAC,,, | Performed by: INTERNAL MEDICINE

## 2019-05-29 PROCEDURE — 3074F SYST BP LT 130 MM HG: CPT | Mod: CPTII,S$GLB,, | Performed by: INTERNAL MEDICINE

## 2019-05-29 PROCEDURE — 99213 OFFICE O/P EST LOW 20 MIN: CPT | Mod: S$GLB,,, | Performed by: INTERNAL MEDICINE

## 2019-05-29 PROCEDURE — 1101F PR PT FALLS ASSESS DOC 0-1 FALLS W/OUT INJ PAST YR: ICD-10-PCS | Mod: CPTII,S$GLB,, | Performed by: INTERNAL MEDICINE

## 2019-05-29 RX ORDER — DOXYCYCLINE 100 MG/1
100 CAPSULE ORAL 2 TIMES DAILY
Qty: 20 CAPSULE | Refills: 0 | Status: SHIPPED | OUTPATIENT
Start: 2019-05-29 | End: 2019-06-08

## 2019-05-29 RX ORDER — PROMETHAZINE HYDROCHLORIDE AND CODEINE PHOSPHATE 6.25; 1 MG/5ML; MG/5ML
5 SOLUTION ORAL NIGHTLY PRN
Qty: 120 ML | Refills: 0 | Status: SHIPPED | OUTPATIENT
Start: 2019-05-29 | End: 2019-06-08

## 2019-05-29 NOTE — PROGRESS NOTES
Subjective:       Patient ID: Audrey Oneil Jr. is a 69 y.o. male.    Chief Complaint: Cough (chest congestion, sore thorat, look at surgery site on chest)    69 year old man complains of chest congestion and cough productive of green sputum for over a week.  No fever or chills.  Had ICD replaced in early May and wants incision checked    Review of Systems   Constitutional: Negative for activity change, appetite change and fever.   HENT: Negative for congestion, postnasal drip and sore throat.    Respiratory: Negative for cough, shortness of breath and wheezing.    Cardiovascular: Negative for chest pain and palpitations.   Gastrointestinal: Negative for abdominal pain, blood in stool, constipation, diarrhea, nausea and vomiting.   Genitourinary: Negative for decreased urine volume, difficulty urinating, flank pain and frequency.   Musculoskeletal: Negative for arthralgias.   Neurological: Negative for dizziness, weakness and headaches.       Objective:      Physical Exam   Constitutional: He is oriented to person, place, and time. He appears well-developed and well-nourished. No distress.   HENT:   Head: Normocephalic and atraumatic.   Right Ear: External ear normal.   Left Ear: External ear normal.   Eyes: Pupils are equal, round, and reactive to light. Conjunctivae and EOM are normal.   Neck: Normal range of motion. Neck supple. No thyromegaly present.   Cardiovascular: Normal rate and regular rhythm.   Pulmonary/Chest: Effort normal. He has rhonchi in the right lower field and the left lower field.   Abdominal: Soft. Bowel sounds are normal. He exhibits no mass. There is no tenderness. There is no rebound and no guarding.   Musculoskeletal: Normal range of motion.   Lymphadenopathy:     He has no cervical adenopathy.   Neurological: He is alert and oriented to person, place, and time. He has normal reflexes. He displays normal reflexes. No cranial nerve deficit. He exhibits normal muscle tone. Coordination  normal.   Skin: Skin is warm and dry.       Assessment:       1. Bronchitis        Plan:   Audrey was seen today for cough.    Diagnoses and all orders for this visit:    Bronchitis    Other orders  -     doxycycline (VIBRAMYCIN) 100 MG Cap; Take 1 capsule (100 mg total) by mouth 2 (two) times daily. for 10 days  -     promethazine-codeine 6.25-10 mg/5 ml (PHENERGAN WITH CODEINE) 6.25-10 mg/5 mL syrup; Take 5 mLs by mouth nightly as needed.

## 2019-06-10 DIAGNOSIS — M1A.0720 IDIOPATHIC CHRONIC GOUT OF LEFT FOOT WITHOUT TOPHUS: ICD-10-CM

## 2019-06-11 RX ORDER — COLCHICINE 0.6 MG/1
TABLET ORAL
Qty: 20 TABLET | Refills: 0 | Status: SHIPPED | OUTPATIENT
Start: 2019-06-11 | End: 2019-10-31 | Stop reason: SDUPTHER

## 2019-06-30 RX ORDER — ATORVASTATIN CALCIUM 80 MG/1
TABLET, FILM COATED ORAL
Qty: 90 TABLET | Refills: 0 | Status: SHIPPED | OUTPATIENT
Start: 2019-06-30 | End: 2019-10-14 | Stop reason: SDUPTHER

## 2019-06-30 RX ORDER — CLOPIDOGREL BISULFATE 75 MG/1
TABLET ORAL
Qty: 90 TABLET | Refills: 0 | Status: SHIPPED | OUTPATIENT
Start: 2019-06-30 | End: 2019-10-19 | Stop reason: SDUPTHER

## 2019-07-30 ENCOUNTER — TELEPHONE (OUTPATIENT)
Dept: CARDIOLOGY | Facility: HOSPITAL | Age: 69
End: 2019-07-30

## 2019-07-30 NOTE — TELEPHONE ENCOUNTER
----- Message from Alesia Daniels sent at 7/30/2019 11:06 AM CDT -----  Contact: Self  .Needs Advice    Reason for call: Pt is asking if can change 8/5 appt for sometime this week. Please don't cancel if none available. Thanks        Communication Preference: [phone]    Additional Information:

## 2019-08-02 ENCOUNTER — TELEPHONE (OUTPATIENT)
Dept: ELECTROPHYSIOLOGY | Facility: CLINIC | Age: 69
End: 2019-08-02

## 2019-08-02 DIAGNOSIS — Z95.810 AICD (AUTOMATIC CARDIOVERTER/DEFIBRILLATOR) PRESENT: Primary | ICD-10-CM

## 2019-08-02 DIAGNOSIS — I25.5 ISCHEMIC CARDIOMYOPATHY: ICD-10-CM

## 2019-08-02 NOTE — TELEPHONE ENCOUNTER
Called & l/m for pt re apts scheduled for Monday, 8/5 (ekg, device ck & Dr. Gama's apt).  Asked pt to please call me if he can not make the apts so we can get him rescheduled.

## 2019-08-02 NOTE — PROGRESS NOTES
Subjective:     HPI    Cardiologist: Edison Marshall MD    I had the pleasure of seeing Audrey Oneil Jr. in follow-up for his history of ischemic cardipmyopathy and ICD in situ. He is a former patient of Dr. Taylor Armijo who is seeing me in the office for the first time today. He is a 69 year old male with a history of HTN, HLD, CAD status-post MI in the past, ischemic cardiomyopathy, and LBBB, who underwent dual chamber ICD implantation in 2/2015, and upgrade to a [employer] CRT-D in 5/2019. He presents to me today to establish care.    Mr. Oneil has noted a marked improvement in his energy level since device upgrade.    I reviewed today's device interrogation, which shows stable device and lead function. He is 100% biv paced. No arrhythmias identified. Estimated battery longevity 7 years.    My interpretation of today's ECG is sinus rhythm with biv pacing at 60 bpm.    Review of Systems   Constitution: Negative for decreased appetite, malaise/fatigue, weight gain and weight loss.   HENT: Negative for sore throat.    Eyes: Negative for blurred vision.   Cardiovascular: Negative for chest pain, dyspnea on exertion, irregular heartbeat, leg swelling, near-syncope, orthopnea, palpitations, paroxysmal nocturnal dyspnea and syncope.   Respiratory: Negative for shortness of breath.    Skin: Negative for rash.   Musculoskeletal: Negative for arthritis.   Gastrointestinal: Negative for abdominal pain.   Neurological: Negative for focal weakness.   Psychiatric/Behavioral: Negative for altered mental status.        Objective:    Physical Exam   Constitutional: He is oriented to person, place, and time. He appears well-developed and well-nourished. No distress.   HENT:   Head: Normocephalic and atraumatic.   Mouth/Throat: Oropharynx is clear and moist.   Eyes: Pupils are equal, round, and reactive to light. No scleral icterus.   Neck: Neck supple. No thyromegaly present.   Cardiovascular: Regular rhythm, normal heart sounds and  normal pulses. Exam reveals no gallop and no friction rub.   No murmur heard.  Pulmonary/Chest: Effort normal and breath sounds normal. He has no rales.   Abdominal: Soft. Bowel sounds are normal. He exhibits no distension. There is no tenderness.   Musculoskeletal: He exhibits no edema.   Neurological: He is alert and oriented to person, place, and time.   Skin: Skin is warm and dry. No rash noted.   Psychiatric: He has a normal mood and affect. His behavior is normal.   Vitals reviewed.        Assessment:       1. Cardiomyopathy, ischemic    2. Chronic combined systolic and diastolic congestive heart failure    3. ICD (implantable cardioverter-defibrillator) in place    4. Hypertensive heart disease with heart failure    5. Hyperlipidemia LDL goal <70    6. LBBB (left bundle branch block)    7. Essential hypertension         Plan:       In summary, Audrey Oneil Jr. is a 69 year old male with a history of ischemic cardiomyopathy who has a [employer] CRT-D device in place. The device is functioning appropriately, with 100% biv pacing and no arrhythmias identified. The plan is for regular device checks and to see me again in 1 year.    Thank you for allowing me to participate in the care of this patient. Please do not hesitate to call me with any questions or concerns.

## 2019-08-05 ENCOUNTER — HOSPITAL ENCOUNTER (OUTPATIENT)
Dept: CARDIOLOGY | Facility: CLINIC | Age: 69
Discharge: HOME | End: 2019-08-05
Payer: MEDICARE

## 2019-08-05 ENCOUNTER — CLINICAL SUPPORT (OUTPATIENT)
Dept: CARDIOLOGY | Facility: HOSPITAL | Age: 69
End: 2019-08-05
Attending: INTERNAL MEDICINE
Payer: MEDICARE

## 2019-08-05 ENCOUNTER — OFFICE VISIT (OUTPATIENT)
Dept: ELECTROPHYSIOLOGY | Facility: CLINIC | Age: 69
End: 2019-08-05
Payer: MEDICARE

## 2019-08-05 VITALS
HEART RATE: 60 BPM | SYSTOLIC BLOOD PRESSURE: 116 MMHG | WEIGHT: 231.5 LBS | HEIGHT: 67 IN | BODY MASS INDEX: 36.34 KG/M2 | DIASTOLIC BLOOD PRESSURE: 74 MMHG

## 2019-08-05 DIAGNOSIS — E78.5 HYPERLIPIDEMIA LDL GOAL <70: ICD-10-CM

## 2019-08-05 DIAGNOSIS — I11.0 HYPERTENSIVE HEART DISEASE WITH HEART FAILURE: ICD-10-CM

## 2019-08-05 DIAGNOSIS — I10 ESSENTIAL HYPERTENSION: ICD-10-CM

## 2019-08-05 DIAGNOSIS — I50.42 CHRONIC COMBINED SYSTOLIC AND DIASTOLIC CONGESTIVE HEART FAILURE: ICD-10-CM

## 2019-08-05 DIAGNOSIS — Z95.810 CARDIAC DEFIBRILLATOR IN PLACE: ICD-10-CM

## 2019-08-05 DIAGNOSIS — I25.5 CARDIOMYOPATHY, ISCHEMIC: Primary | Chronic | ICD-10-CM

## 2019-08-05 DIAGNOSIS — Z95.810 ICD (IMPLANTABLE CARDIOVERTER-DEFIBRILLATOR) IN PLACE: ICD-10-CM

## 2019-08-05 DIAGNOSIS — I50.41 ACUTE COMBINED SYSTOLIC AND DIASTOLIC CONGESTIVE HEART FAILURE: ICD-10-CM

## 2019-08-05 DIAGNOSIS — I25.5 CARDIOMYOPATHY, ISCHEMIC: ICD-10-CM

## 2019-08-05 DIAGNOSIS — I44.7 LBBB (LEFT BUNDLE BRANCH BLOCK): ICD-10-CM

## 2019-08-05 PROCEDURE — 99999 PR PBB SHADOW E&M-EST. PATIENT-LVL III: ICD-10-PCS | Mod: PBBFAC,,, | Performed by: INTERNAL MEDICINE

## 2019-08-05 PROCEDURE — 93010 RHYTHM STRIP: ICD-10-PCS | Mod: S$GLB,,, | Performed by: INTERNAL MEDICINE

## 2019-08-05 PROCEDURE — 93284 PRGRMG EVAL IMPLANTABLE DFB: CPT

## 2019-08-05 PROCEDURE — 3074F SYST BP LT 130 MM HG: CPT | Mod: CPTII,S$GLB,, | Performed by: INTERNAL MEDICINE

## 2019-08-05 PROCEDURE — 99499 UNLISTED E&M SERVICE: CPT | Mod: S$GLB,,, | Performed by: INTERNAL MEDICINE

## 2019-08-05 PROCEDURE — 3074F PR MOST RECENT SYSTOLIC BLOOD PRESSURE < 130 MM HG: ICD-10-PCS | Mod: CPTII,S$GLB,, | Performed by: INTERNAL MEDICINE

## 2019-08-05 PROCEDURE — 93010 ELECTROCARDIOGRAM REPORT: CPT | Mod: S$GLB,,, | Performed by: INTERNAL MEDICINE

## 2019-08-05 PROCEDURE — 99999 PR PBB SHADOW E&M-EST. PATIENT-LVL III: CPT | Mod: PBBFAC,,, | Performed by: INTERNAL MEDICINE

## 2019-08-05 PROCEDURE — 3078F DIAST BP <80 MM HG: CPT | Mod: CPTII,S$GLB,, | Performed by: INTERNAL MEDICINE

## 2019-08-05 PROCEDURE — 99215 OFFICE O/P EST HI 40 MIN: CPT | Mod: S$GLB,,, | Performed by: INTERNAL MEDICINE

## 2019-08-05 PROCEDURE — 93005 ELECTROCARDIOGRAM TRACING: CPT | Mod: S$GLB,,, | Performed by: INTERNAL MEDICINE

## 2019-08-05 PROCEDURE — 93005 RHYTHM STRIP: ICD-10-PCS | Mod: S$GLB,,, | Performed by: INTERNAL MEDICINE

## 2019-08-05 PROCEDURE — 1101F PR PT FALLS ASSESS DOC 0-1 FALLS W/OUT INJ PAST YR: ICD-10-PCS | Mod: CPTII,S$GLB,, | Performed by: INTERNAL MEDICINE

## 2019-08-05 PROCEDURE — 3078F PR MOST RECENT DIASTOLIC BLOOD PRESSURE < 80 MM HG: ICD-10-PCS | Mod: CPTII,S$GLB,, | Performed by: INTERNAL MEDICINE

## 2019-08-05 PROCEDURE — 99215 PR OFFICE/OUTPT VISIT, EST, LEVL V, 40-54 MIN: ICD-10-PCS | Mod: S$GLB,,, | Performed by: INTERNAL MEDICINE

## 2019-08-05 PROCEDURE — 1101F PT FALLS ASSESS-DOCD LE1/YR: CPT | Mod: CPTII,S$GLB,, | Performed by: INTERNAL MEDICINE

## 2019-08-05 PROCEDURE — 99499 RISK ADDL DX/OHS AUDIT: ICD-10-PCS | Mod: S$GLB,,, | Performed by: INTERNAL MEDICINE

## 2019-09-13 DIAGNOSIS — Z12.11 COLON CANCER SCREENING: ICD-10-CM

## 2019-10-14 RX ORDER — ATORVASTATIN CALCIUM 80 MG/1
TABLET, FILM COATED ORAL
Qty: 90 TABLET | Refills: 0 | Status: SHIPPED | OUTPATIENT
Start: 2019-10-14 | End: 2020-01-12

## 2019-10-19 RX ORDER — CLOPIDOGREL BISULFATE 75 MG/1
TABLET ORAL
Qty: 90 TABLET | Refills: 0 | Status: SHIPPED | OUTPATIENT
Start: 2019-10-19 | End: 2020-01-28 | Stop reason: SDUPTHER

## 2019-10-28 PROCEDURE — 99283 EMERGENCY DEPT VISIT LOW MDM: CPT

## 2019-10-28 PROCEDURE — 99284 PR EMERGENCY DEPT VISIT,LEVEL IV: ICD-10-PCS | Mod: ,,, | Performed by: EMERGENCY MEDICINE

## 2019-10-28 PROCEDURE — 99284 EMERGENCY DEPT VISIT MOD MDM: CPT | Mod: ,,, | Performed by: EMERGENCY MEDICINE

## 2019-10-29 ENCOUNTER — HOSPITAL ENCOUNTER (EMERGENCY)
Facility: HOSPITAL | Age: 69
Discharge: HOME | End: 2019-10-29
Attending: EMERGENCY MEDICINE
Payer: MEDICARE

## 2019-10-29 VITALS
HEART RATE: 67 BPM | BODY MASS INDEX: 36.1 KG/M2 | WEIGHT: 230 LBS | OXYGEN SATURATION: 94 % | DIASTOLIC BLOOD PRESSURE: 75 MMHG | SYSTOLIC BLOOD PRESSURE: 117 MMHG | TEMPERATURE: 98 F | RESPIRATION RATE: 18 BRPM | HEIGHT: 67 IN
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
## 2019-10-29 DIAGNOSIS — S46.911A STRAIN OF RIGHT SHOULDER, INITIAL ENCOUNTER: Primary | ICD-10-CM

## 2019-10-29 PROCEDURE — 25000003 PHARM REV CODE 250: Performed by: STUDENT IN AN ORGANIZED HEALTH CARE EDUCATION/TRAINING PROGRAM

## 2019-10-29 RX ORDER — METHOCARBAMOL 500 MG/1
1000 TABLET, FILM COATED ORAL
Status: COMPLETED | OUTPATIENT
Start: 2019-10-29 | End: 2019-10-29

## 2019-10-29 RX ORDER — METHOCARBAMOL 500 MG/1
1000 TABLET, FILM COATED ORAL 3 TIMES DAILY PRN
Qty: 15 TABLET | Refills: 0 | Status: SHIPPED | OUTPATIENT
Start: 2019-10-29 | End: 2019-11-04

## 2019-10-29 RX ORDER — ACETAMINOPHEN 325 MG/1
650 TABLET ORAL
Status: COMPLETED | OUTPATIENT
Start: 2019-10-29 | End: 2019-10-29

## 2019-10-29 RX ADMIN — ACETAMINOPHEN 650 MG: 325 TABLET ORAL at 01:10

## 2019-10-29 RX ADMIN — METHOCARBAMOL TABLETS 1000 MG: 500 TABLET, COATED ORAL at 01:10

## 2019-10-29 NOTE — ED TRIAGE NOTES
Patient reports falling out of chair and hitting shoulder. Reports pain 7/10 in right shoulder and right neck with movement. Denies hitting head, LOC, chest pain, or SOB.

## 2019-10-29 NOTE — ED PROVIDER NOTES
Encounter Date: 10/28/2019       History     Chief Complaint   Patient presents with    Fall     patient fell yesterday trying to get in a wheelchair that wasn't locked.  Patient also reports a fall last week in the shower.       Patient is a 69-yr-old male with PMH of MI s/p stent placement and AICD, prior DVT/PE not currently on anticoagulants, HFrEF (last EF of 20% in 1/2019), chronic back pain, and obesity presenting with right shoulder pain after falling out of his mother's wheelchair on 10/28 in the morning. States he was sitting in the wheelchair when it rolled and he fell on his back. No LOC, but does have right paraspinal neck pain and right shoulder pain now that is not resolved with toradol or Norco 10. Pain is 7/10 with movement and 0/10 at rest. Unable to fully abduct his right shoulder. Denies headache, vision changes, numbness, chest pain, palpations, abdominal pain, bowel changes, urinary symptoms, leg pain, or hip pain. Not currently on blood thinner. States his fall was no exacerbated by palpations or chest pain.     Also had a fall 1 week ago when getting up and having the bar in the shower break. Again reports no LOC from this event or exacerbating events.              Review of patient's allergies indicates:   Allergen Reactions    Iodine containing multivitamin Swelling     itching    Keflex [cephalexin] Swelling     Eyes.  Tolerated multiple doses of zosyn and 1 dose of augmentin in 2015 and 2016, respectively    Peaches [peach (prunus persica)] Swelling     eyes    Shellfish containing products Swelling    Fig tree Swelling     itching    Tuberculin spenser test ppd Rash     Past Medical History:   Diagnosis Date    AICD (automatic cardioverter/defibrillator) present 12/13/2015      Anticoagulant long-term use     CHF (congestive heart failure)     Chronic anticoagulation 5/5/2016    Chronic combined systolic and diastolic heart failure 11/26/2012    EF 10-20% on ECHO 2013     Clotting disorder     Coronary artery disease involving native coronary artery of native heart without angina pectoris 11/26/2012    Cath 10- Stents patent non-obstructive disease Cath 11-12015 non-obstructive disease     Diverticulosis of colon     DVT (deep venous thrombosis), unspecified laterality 11/12/2015    Essential hypertension 11/15/2015    Hyperlipidemia     Hypertensive heart disease with heart failure 5/5/2016    MI (myocardial infarction) 2009    x's 5    Nicotine abuse     Obesity 11/26/2012    Pulmonary embolism 2011    Renal disorder     LAVERNE    Stented coronary artery 11/26/2012    LAD stent placed 10/17/2007      Past Surgical History:   Procedure Laterality Date    APPENDECTOMY      BACK SURGERY      CARDIAC DEFIBRILLATOR PLACEMENT      CARDIAC SURGERY  2007    stent    CARPAL TUNNEL RELEASE Right 04/2018    INSERTION OF BIVENTRICULAR IMPLANTABLE CARDIOVERTER-DEFIBRILLATOR (ICD) N/A 5/3/2019    Procedure: INSERTION, ICD, BIVENTRICULAR;  Surgeon: Teofilo Pal MD;  Location: [facility] EP LAB;  Service: Cardiology;  Laterality: N/A;  ICH CM,  ICD UPGD BI-V,  SJM, MAC, FAS, 3PREP (dual ICD INSITU)    r knee scope      REVISION OF SKIN POCKET FOR CARDIOVERTER-DEFIBRILLATOR Left 5/3/2019    Procedure: Revision, Skin Pocket, For Cardioverter-Defibrillator;  Surgeon: Teofilo Pal MD;  Location: [facility] EP LAB;  Service: Cardiology;  Laterality: Left;    SPINE SURGERY      TONSILLECTOMY      TRIGGER FINGER RELEASE Right 04/2018    thumb     Family History   Problem Relation Age of Onset    Cancer Mother         colon cancer    Heart disease Mother     Diabetes Mother     Heart disease Father     Stroke Father     Colon polyps Father     Cancer Paternal Grandmother         leukemia    No Known Problems Sister     No Known Problems Daughter     No Known Problems Son     No Known Problems Sister     No Known Problems Son      Social History     Tobacco  Use    Smoking status: Former Smoker     Packs/day: 1.00     Years: 45.00     Pack years: 45.00     Types: Cigarettes     Last attempt to quit: 2005     Years since quittin.8    Smokeless tobacco: Never Used    Tobacco comment: 1-1.5 ppd every day.   Substance Use Topics    Alcohol use: No     Frequency: Never    Drug use: No     Review of Systems   Constitutional: Negative for appetite change and fever.   HENT: Negative for congestion and trouble swallowing.    Eyes: Negative for pain and visual disturbance.   Respiratory: Negative for cough, chest tightness and shortness of breath.    Cardiovascular: Negative for chest pain, palpitations and leg swelling.   Gastrointestinal: Negative for abdominal pain, nausea and vomiting.   Genitourinary: Negative.  Negative for flank pain.   Musculoskeletal: Positive for back pain and neck pain. Negative for gait problem and joint swelling.   Skin: Negative for rash and wound.   Neurological: Negative for syncope and headaches.       Physical Exam     Initial Vitals [10/28/19 2342]   BP Pulse Resp Temp SpO2   114/74 70 16 97.6 °F (36.4 °C) 98 %      MAP       --         Physical Exam    Nursing note and vitals reviewed.  Constitutional: He appears well-developed and well-nourished. He is not diaphoretic.   Overweight male, alert and oriented, sitting in bed speaking in full sentences, moving his neck spontaneously during conversation.    HENT:   Head: Normocephalic and atraumatic.   Right Ear: External ear normal.   Left Ear: External ear normal.   Nose: Nose normal.   Mouth/Throat: Oropharynx is clear and moist.   Eyes: Conjunctivae and EOM are normal. Pupils are equal, round, and reactive to light. Right eye exhibits no discharge. Left eye exhibits no discharge. No scleral icterus.   Neck: Normal range of motion. Neck supple. No JVD present.   Cardiovascular: Normal rate, regular rhythm, normal heart sounds and intact distal pulses.   Left chest scar from  prior AICD placement   Pulmonary/Chest: Breath sounds normal. No respiratory distress. He has no wheezes. He has no rales.   Abdominal: Soft. Bowel sounds are normal. He exhibits no distension. There is no tenderness. There is no rebound.   Musculoskeletal: He exhibits tenderness. He exhibits no edema.   No spinal tenderness, step-offs or deformities. Right paraspinal muscle tenderness. Right shoulder tenderness limited exam. Patient unable to abduct his shoulder past 180 degrees 2/2 pain. No gross deformities to shoulder or clavicle. No chest wall or scapular tenderness. No humerus tenderness, full ROM of elbow and right, and sensation intact throughout. 2+ pulses. No hip tenderness. Normal gait with no lower extremity tenderness.    Neurological: He is alert and oriented to person, place, and time. GCS score is 15. GCS eye subscore is 4. GCS verbal subscore is 5. GCS motor subscore is 6.   Skin: Skin is warm. Capillary refill takes less than 2 seconds. No erythema.   Psychiatric: He has a normal mood and affect.         ED Course   Procedures  Labs Reviewed - No data to display       Imaging Results          X-Ray Shoulder Trauma Right (Final result)  Result time 10/29/19 01:34:44    Final result by Denton Samayoa MD (10/29/19 01:34:44)                 Impression:      No acute fracture.    DJD.      Electronically signed by: Denton Samayoa MD  Date:    10/29/2019  Time:    01:34             Narrative:    EXAMINATION:  XR SHOULDER TRAUMA 3 VIEW RIGHT    CLINICAL HISTORY:  Unspecified fall, initial encounter    TECHNIQUE:  Three views of the right shoulder were performed.    COMPARISON:  Right shoulder October 11, 2017.  Chest x-ray May 3, 2019.    FINDINGS:  Bones: No fracture.  No lytic or blastic lesion.    Joints: No evidence for glenohumeral dislocation.  Degenerative changes right glenohumeral and acromioclavicular joints appears similar to prior study.    Soft tissues: Unremarkable.    The included  right chest appears similar to prior chest x-ray.                                 Medical Decision Making:   History:   Old Records Summarized: records from clinic visits.       <> Summary of Records: Seen in cardiology clinic for regular follow-up in 8/2019  Initial Assessment:   Patient is a 69-yr-old male with PMH of MI s/p stent placement and AICD, prior DVT/PE not currently on anticoagulants, HFrEF (last EF of 20% in 1/2019), chronic back pain on Norco-10 and toradol, and obesity presenting with right shoulder pain after falling out of his mother's wheelchair Monday morning when wheelchair was not locked. No head trauma or LOC but currently has 7/10 right shoulder pain with movement. Vital signs within normal limited with no spinal tenderness. Paraspinal neck pain on palpation and right shoulder tenderness with inability to abduct shoulder past 180 in setting of pain. No arm, elbow, forearm, wrist or hand pain. No chest wall tenderness. Falls not preceded by headache, weakness, or palpitations. Neurovascularly intact on exam.  Differential Diagnosis:   Differential includes but not limited to: shoulder dislocation, clavicle fracture, humerus fracture, MSK pain       APC / Resident Notes:   Concern for shoulder and clavicle trauma. Given Tylenol and robaxin for pain. XR of shoulder and clavicle ordered. Injuries should mechanical in origin based on patient's history and labs not indicated at this time. Further treatment pending imaging results.   Solis Patrick MD  1:33 AM    PGY-2 Update:  XR of shoulder showed no clavicle fracture. Patient received robaxin and Tylenol. Discussed findings with patient. He understands he has no obvious fracture but may still have tendon injuries or MSK sprain. Discussed starting muscle relaxers but patient refused due to insurance issues and compromised on having a script for a couple of days of pills. He has an Orthopedic surgeon and understands he should follow up with her  in the next 1-2 weeks. Given no acute fractures, stable for discharge with outpatient follow-up. Discussed return precautions and patient understands he should return if he develops non-mechanical falls as well.  1:59 AM             Attending Attestation:   Physician Attestation Statement for Resident:  As the supervising MD   Physician Attestation Statement: I have personally seen and examined this patient.   I agree with the above history. -: 69-year-old man presents complaining of right shoulder and upper back pain after 2 mechanical falls.  Denies hitting his head.  Denies any other joint pain. Denies prior injury to his shoulder.   As the supervising MD I agree with the above PE.   -: No midline C-spine or other vertebral tenderness. He is awake and alert and neurologically intact. There is point tenderness on the superior portion of his shoulder over the acromion and AC joint.  No deformity.  Limited range of motion of his shoulder passively secondary to pain. No ecchymosis, erythema, edema. Distally neurovascularly intact.   As the supervising MD I agree with the above treatment, course, plan, and disposition.   -: I have independently reviewed the x-ray of his shoulder which shows no fracture or dislocation.  I have advised the patient that this could still represent internal derangement of his shoulder in spite of had negative x-ray.  I have recommended he follow up with Orthopedics if his pain does not improve within 1-2 weeks.  Patient expressed understanding.                       Clinical Impression:       ICD-10-CM ICD-9-CM   1. Strain of right shoulder, initial encounter S46.911A 840.9   2. Fall W19.XXXA E888.9                                Mechelle Guido MD  10/30/19 4440

## 2019-10-30 ENCOUNTER — HOSPITAL ENCOUNTER (EMERGENCY)
Facility: HOSPITAL | Age: 69
Discharge: HOME | End: 2019-10-30
Attending: EMERGENCY MEDICINE
Payer: MEDICARE

## 2019-10-30 VITALS
WEIGHT: 230 LBS | HEART RATE: 64 BPM | RESPIRATION RATE: 19 BRPM | HEIGHT: 67 IN | BODY MASS INDEX: 36.1 KG/M2 | OXYGEN SATURATION: 92 % | DIASTOLIC BLOOD PRESSURE: 55 MMHG | SYSTOLIC BLOOD PRESSURE: 109 MMHG | TEMPERATURE: 99 F

## 2019-10-30 DIAGNOSIS — M25.511 RIGHT SHOULDER PAIN, UNSPECIFIED CHRONICITY: Primary | ICD-10-CM

## 2019-10-30 DIAGNOSIS — M79.603 ARM PAIN: ICD-10-CM

## 2019-10-30 PROCEDURE — 93010 EKG 12-LEAD: ICD-10-PCS | Mod: ,,, | Performed by: INTERNAL MEDICINE

## 2019-10-30 PROCEDURE — 99283 EMERGENCY DEPT VISIT LOW MDM: CPT | Mod: 25

## 2019-10-30 PROCEDURE — 99282 PR EMERGENCY DEPT VISIT,LEVEL II: ICD-10-PCS | Mod: ,,, | Performed by: EMERGENCY MEDICINE

## 2019-10-30 PROCEDURE — 93010 ELECTROCARDIOGRAM REPORT: CPT | Mod: ,,, | Performed by: INTERNAL MEDICINE

## 2019-10-30 PROCEDURE — 93005 ELECTROCARDIOGRAM TRACING: CPT

## 2019-10-30 PROCEDURE — 99282 EMERGENCY DEPT VISIT SF MDM: CPT | Mod: ,,, | Performed by: EMERGENCY MEDICINE

## 2019-10-30 NOTE — ED PROVIDER NOTES
"Source of History:  patient    Chief complaint:  Shoulder Pain (Pt c/o right shoulder pain with radiation to right neck since fall a "few days ago". Pt was seen two days ago for same complaint and reports no injury diagnosed. Decreased ROM. No obvious deformity. )      HPI:  Audrey Oneil Jr. is a 69 y.o. male presenting with right shoulder pain.  Patient was seen yesterday, he came after a fall from his mother's wheelchair where he landed on his back.  He began experiencing right shoulder pain that does not radiate.  It is completely isolated to he his AC joint.  Worse with movement, better with rest.  States the anti-inflammatories at home were not very helpful.  Denies any numbness or tingling.  Patient returned today because he was concerned about a blood clot.  He had a pulmonary embolus 11 years ago, and was worried that this pain was due to a new blood clot.  Denies any swelling in his arm, he is still taking Plavix, no other complaints.    ROS: As per HPI and below:    General: No fever.  No chills.  Eyes: No visual changes.  Head: No headache.    Integument: No rashes or lesions.  Chest: No shortness of breath.  Cardiovascular: No chest pain.  Abdomen: No abdominal pain.  No nausea or vomiting.  Urinary: No abnormal urination.  Neurologic: No focal weakness.  No numbness.  Hematologic: No easy bruising.  Endocrine: No excessive thirst or urination.      Review of patient's allergies indicates:   Allergen Reactions    Iodine containing multivitamin Swelling     itching    Keflex [cephalexin] Swelling     Eyes.  Tolerated multiple doses of zosyn and 1 dose of augmentin in 2015 and 2016, respectively    Peaches [peach (prunus persica)] Swelling     eyes    Shellfish containing products Swelling    Fig tree Swelling     itching    Tuberculin spenser test ppd Rash       Current Facility-Administered Medications on File Prior to Encounter   Medication Dose Route Frequency Provider Last Rate Last Dose    " 0.9%  NaCl infusion   Intravenous Continuous Matilde French NP        vancomycin in dextrose 5 % 1 gram/250 mL IVPB 1,000 mg  1,000 mg Intravenous On Call Procedure Matilde French NP   500 mg at 05/03/19 1051     Current Outpatient Medications on File Prior to Encounter   Medication Sig Dispense Refill    allopurinol (ZYLOPRIM) 100 MG tablet TAKE 1 TABLET(100 MG) BY MOUTH EVERY DAY (Patient taking differently: TAKE 1 TABLET(100 MG) BY MOUTH EVERY NIGHT) 90 tablet 3    amiodarone (PACERONE) 100 MG Tab Take 1 tablet (100 mg total) by mouth once daily. (Patient taking differently: Take 100 mg by mouth every evening. ) 30 tablet 11    aspirin (ECOTRIN) 325 MG EC tablet Take 325 mg by mouth every evening.       atorvastatin (LIPITOR) 80 MG tablet TAKE 1 TABLET(80 MG) BY MOUTH EVERY DAY 90 tablet 0    clopidogrel (PLAVIX) 75 mg tablet TAKE 1 TABLET(75 MG) BY MOUTH EVERY DAY 90 tablet 0    colchicine (COLCRYS) 0.6 mg tablet TAKE 2 TABLETS BY MOUTH FOR 1 DAY, THEN TAKE 1 TABLET BY MOUTH EVERY DAY THEREAFTER AS NEEDED 20 tablet 0    docusate sodium (COLACE) 50 MG capsule Take 1 capsule (50 mg total) by mouth 2 (two) times daily. (Patient taking differently: Take 50 mg by mouth 2 (two) times daily as needed. ) 30 capsule 0    furosemide (LASIX) 40 MG tablet Take 1 tablet (40 mg total) by mouth 2 (two) times daily. (Patient taking differently: Take 80 mg by mouth every evening. ) 60 tablet 11    HYDROcodone-acetaminophen (NORCO)  mg per tablet Take 1 tablet by mouth every 6 (six) hours as needed for Pain.      methocarbamol (ROBAXIN) 500 MG Tab Take 2 tablets (1,000 mg total) by mouth 3 (three) times daily as needed (muscle pain). 15 tablet 0    metoprolol succinate (TOPROL-XL) 50 MG 24 hr tablet Take 1 tablet (50 mg total) by mouth once daily. (Patient taking differently: Take 50 mg by mouth every evening. ) 90 tablet 3       PMH:  As per HPI and below:  Past Medical History:   Diagnosis Date     AICD (automatic cardioverter/defibrillator) present 12/13/2015      Anticoagulant long-term use     CHF (congestive heart failure)     Chronic anticoagulation 5/5/2016    Chronic combined systolic and diastolic heart failure 11/26/2012    EF 10-20% on ECHO 2013    Clotting disorder     Coronary artery disease involving native coronary artery of native heart without angina pectoris 11/26/2012    Cath 10- Stents patent non-obstructive disease Cath 11-12015 non-obstructive disease     Diverticulosis of colon     DVT (deep venous thrombosis), unspecified laterality 11/12/2015    Essential hypertension 11/15/2015    Hyperlipidemia     Hypertensive heart disease with heart failure 5/5/2016    MI (myocardial infarction) 2009    x's 5    Nicotine abuse     Obesity 11/26/2012    Pulmonary embolism 2011    Renal disorder     LAVERNE    Stented coronary artery 11/26/2012    LAD stent placed 10/17/2007      Past Surgical History:   Procedure Laterality Date    APPENDECTOMY      BACK SURGERY      CARDIAC DEFIBRILLATOR PLACEMENT      CARDIAC SURGERY  2007    stent    CARPAL TUNNEL RELEASE Right 04/2018    INSERTION OF BIVENTRICULAR IMPLANTABLE CARDIOVERTER-DEFIBRILLATOR (ICD) N/A 5/3/2019    Procedure: INSERTION, ICD, BIVENTRICULAR;  Surgeon: Teofilo Pal MD;  Location: [facility] EP LAB;  Service: Cardiology;  Laterality: N/A;  ICH CM,  ICD UPGD BI-V,  SJM, MAC, FAS, 3PREP (dual ICD INSITU)    r knee scope      REVISION OF SKIN POCKET FOR CARDIOVERTER-DEFIBRILLATOR Left 5/3/2019    Procedure: Revision, Skin Pocket, For Cardioverter-Defibrillator;  Surgeon: Teofilo Pal MD;  Location: [facility] EP LAB;  Service: Cardiology;  Laterality: Left;    SPINE SURGERY      TONSILLECTOMY      TRIGGER FINGER RELEASE Right 04/2018    thumb       Social History     Socioeconomic History    Marital status:      Spouse name: Not on file    Number of children: 2    Years of education:  Not on file    Highest education level: Not on file   Occupational History    Occupation: [employer]     Comment: unemployed   Social Needs    Financial resource strain: Not on file    Food insecurity:     Worry: Not on file     Inability: Not on file    Transportation needs:     Medical: Not on file     Non-medical: Not on file   Tobacco Use    Smoking status: Former Smoker     Packs/day: 1.00     Years: 45.00     Pack years: 45.00     Types: Cigarettes     Last attempt to quit: 2005     Years since quittin.8    Smokeless tobacco: Never Used    Tobacco comment: 1-1.5 ppd every day.   Substance and Sexual Activity    Alcohol use: No     Frequency: Never    Drug use: No    Sexual activity: Never   Lifestyle    Physical activity:     Days per week: Not on file     Minutes per session: Not on file    Stress: Not on file   Relationships    Social connections:     Talks on phone: Not on file     Gets together: Not on file     Attends Jew service: Not on file     Active member of club or organization: Not on file     Attends meetings of clubs or organizations: Not on file     Relationship status: Not on file   Other Topics Concern    Not on file   Social History Narrative    Not on file       Family History   Problem Relation Age of Onset    Cancer Mother         colon cancer    Heart disease Mother     Diabetes Mother     Heart disease Father     Stroke Father     Colon polyps Father     Cancer Paternal Grandmother         leukemia    No Known Problems Sister     No Known Problems Daughter     No Known Problems Son     No Known Problems Sister     No Known Problems Son        Physical Exam:    Vitals:    10/30/19 0525   BP: (!) 109/55   Pulse: 64   Resp: 19   Temp:      Appearance: No acute distress.  Skin: No rashes seen.  Good turgor.  No abrasions.  No ecchymoses.  Eyes: No conjunctival injection. EOMI, PERRL.  ENT: Oropharynx clear.    Chest: Clear to auscultation  bilaterally.  Good air movement.  No wheezes.  No rhonchi.  Cardiovascular: Regular rate and rhythm.  No murmurs. No gallops. No rubs.  Abdomen: Soft.  Not distended.  Nontender.  No guarding.  No rebound. No Masses  Musculoskeletal:  Tenderness directly above AC joint Good range of motion all joints.  No deformities.  Neck supple.  No meningismus.  Neurologic: Moves all extremities.  Normal sensation.  No facial droop.  Normal speech.    Mental Status:  Alert and oriented x 3.  Appropriate, conversant.          Laboratory Studies:  Labs Reviewed - No data to display    I decided to obtain the old medical records.  Reviewed and summarized the old medical record and it showed previous visit with normal appearing x-ray of patient's right shoulder      Imaging Results    None         Medications Given:  Medications - No data to display    Discussed with:  Patient    MDM:    69 y.o. male with right shoulder pain concerning for DVT for the patient.  I am not concerned about DVT given lack of swelling, redness, lack of risk factors.  Patient is currently anticoagulated with Plavix and aspirin.  X-rays yesterday were negative for fracture.  I suspect this is more likely an AC joint sprain or tendon injury. Patient has full passive range of motion with some discomfort, improved from yesterday according to documentation.  He is stable for discharge, not requesting any additional pain medication or other problem.Advised pt to follow up with PCP or return if concerning symptoms arise. Pt understands and agrees with plan. Will d/c home.          Diagnostic Impression:    1. Right shoulder pain, unspecified chronicity    2. Arm pain               Ramu Martinez MD  10/30/19 0604

## 2019-10-30 NOTE — ED TRIAGE NOTES
"Audrey Oneil Jr., a 69 y.o. male presents to the ED w/ complaint of pain to top of R shoulder that radiates into neck. Seen yesterday here for same complaint - x-rays negative for frx. No deformity/redness/swelling/warmth noted.     Triage note:  Chief Complaint   Patient presents with    Shoulder Pain     Pt c/o right shoulder pain with radiation to right neck since fall a "few days ago". Pt was seen two days ago for same complaint and reports no injury diagnosed. Decreased ROM. No obvious deformity.      Review of patient's allergies indicates:   Allergen Reactions    Iodine containing multivitamin Swelling     itching    Keflex [cephalexin] Swelling     Eyes.  Tolerated multiple doses of zosyn and 1 dose of augmentin in 2015 and 2016, respectively    Peaches [peach (prunus persica)] Swelling     eyes    Shellfish containing products Swelling    Fig tree Swelling     itching    Tuberculin spenser test ppd Rash     Past Medical History:   Diagnosis Date    AICD (automatic cardioverter/defibrillator) present 12/13/2015      Anticoagulant long-term use     CHF (congestive heart failure)     Chronic anticoagulation 5/5/2016    Chronic combined systolic and diastolic heart failure 11/26/2012    EF 10-20% on ECHO 2013    Clotting disorder     Coronary artery disease involving native coronary artery of native heart without angina pectoris 11/26/2012    Cath 10- Stents patent non-obstructive disease Cath 11-12015 non-obstructive disease     Diverticulosis of colon     DVT (deep venous thrombosis), unspecified laterality 11/12/2015    Essential hypertension 11/15/2015    Hyperlipidemia     Hypertensive heart disease with heart failure 5/5/2016    MI (myocardial infarction) 2009    x's 5    Nicotine abuse     Obesity 11/26/2012    Pulmonary embolism 2011    Renal disorder     LAVERNE    Stented coronary artery 11/26/2012    LAD stent placed 10/17/2007      "

## 2019-10-31 DIAGNOSIS — M1A.0720 IDIOPATHIC CHRONIC GOUT OF LEFT FOOT WITHOUT TOPHUS: ICD-10-CM

## 2019-11-01 ENCOUNTER — HOSPITAL ENCOUNTER (EMERGENCY)
Facility: HOSPITAL | Age: 69
Discharge: HOME | End: 2019-11-01
Attending: EMERGENCY MEDICINE
Payer: MEDICARE

## 2019-11-01 VITALS
BODY MASS INDEX: 36.1 KG/M2 | SYSTOLIC BLOOD PRESSURE: 121 MMHG | RESPIRATION RATE: 20 BRPM | DIASTOLIC BLOOD PRESSURE: 62 MMHG | WEIGHT: 230 LBS | HEIGHT: 67 IN | TEMPERATURE: 98 F | OXYGEN SATURATION: 98 % | HEART RATE: 79 BPM

## 2019-11-01 DIAGNOSIS — M25.511 ACUTE PAIN OF RIGHT SHOULDER: ICD-10-CM

## 2019-11-01 DIAGNOSIS — M10.9 ACUTE GOUT OF RIGHT SHOULDER, UNSPECIFIED CAUSE: Primary | ICD-10-CM

## 2019-11-01 LAB
ALBUMIN SERPL BCP-MCNC: 3.9 G/DL (ref 3.5–5.2)
ALP SERPL-CCNC: 118 U/L (ref 55–135)
ALT SERPL W/O P-5'-P-CCNC: 45 U/L (ref 10–44)
ANION GAP SERPL CALC-SCNC: 14 MMOL/L (ref 8–16)
AST SERPL-CCNC: 51 U/L (ref 10–40)
BASOPHILS # BLD AUTO: 0.07 K/UL (ref 0–0.2)
BASOPHILS NFR BLD: 0.6 % (ref 0–1.9)
BILIRUB SERPL-MCNC: 0.9 MG/DL (ref 0.1–1)
BUN SERPL-MCNC: 19 MG/DL (ref 8–23)
CALCIUM SERPL-MCNC: 8.9 MG/DL (ref 8.7–10.5)
CHLORIDE SERPL-SCNC: 102 MMOL/L (ref 95–110)
CO2 SERPL-SCNC: 27 MMOL/L (ref 23–29)
CREAT SERPL-MCNC: 1 MG/DL (ref 0.5–1.4)
CRP SERPL-MCNC: 10.9 MG/L (ref 0–8.2)
DIFFERENTIAL METHOD: ABNORMAL
EOSINOPHIL # BLD AUTO: 0.1 K/UL (ref 0–0.5)
EOSINOPHIL NFR BLD: 1 % (ref 0–8)
ERYTHROCYTE [DISTWIDTH] IN BLOOD BY AUTOMATED COUNT: 15.3 % (ref 11.5–14.5)
ERYTHROCYTE [SEDIMENTATION RATE] IN BLOOD BY WESTERGREN METHOD: 39 MM/HR (ref 0–23)
EST. GFR  (AFRICAN AMERICAN): >60 ML/MIN/1.73 M^2
EST. GFR  (NON AFRICAN AMERICAN): >60 ML/MIN/1.73 M^2
GLUCOSE SERPL-MCNC: 130 MG/DL (ref 70–110)
HCT VFR BLD AUTO: 48.9 % (ref 40–54)
HGB BLD-MCNC: 15.3 G/DL (ref 14–18)
IMM GRANULOCYTES # BLD AUTO: 0.05 K/UL (ref 0–0.04)
IMM GRANULOCYTES NFR BLD AUTO: 0.5 % (ref 0–0.5)
LYMPHOCYTES # BLD AUTO: 1.5 K/UL (ref 1–4.8)
LYMPHOCYTES NFR BLD: 13.8 % (ref 18–48)
MCH RBC QN AUTO: 28.9 PG (ref 27–31)
MCHC RBC AUTO-ENTMCNC: 31.3 G/DL (ref 32–36)
MCV RBC AUTO: 92 FL (ref 82–98)
MONOCYTES # BLD AUTO: 0.8 K/UL (ref 0.3–1)
MONOCYTES NFR BLD: 6.9 % (ref 4–15)
NEUTROPHILS # BLD AUTO: 8.5 K/UL (ref 1.8–7.7)
NEUTROPHILS NFR BLD: 77.2 % (ref 38–73)
NRBC BLD-RTO: 0 /100 WBC
PLATELET # BLD AUTO: 214 K/UL (ref 150–350)
PMV BLD AUTO: 10.6 FL (ref 9.2–12.9)
POTASSIUM SERPL-SCNC: 3.8 MMOL/L (ref 3.5–5.1)
PROT SERPL-MCNC: 7 G/DL (ref 6–8.4)
RBC # BLD AUTO: 5.29 M/UL (ref 4.6–6.2)
SODIUM SERPL-SCNC: 143 MMOL/L (ref 136–145)
URATE SERPL-MCNC: 8.7 MG/DL (ref 3.4–7)
WBC # BLD AUTO: 10.99 K/UL (ref 3.9–12.7)

## 2019-11-01 PROCEDURE — 85652 RBC SED RATE AUTOMATED: CPT

## 2019-11-01 PROCEDURE — 25000003 PHARM REV CODE 250: Performed by: EMERGENCY MEDICINE

## 2019-11-01 PROCEDURE — 99285 EMERGENCY DEPT VISIT HI MDM: CPT | Mod: ,,, | Performed by: EMERGENCY MEDICINE

## 2019-11-01 PROCEDURE — 80053 COMPREHEN METABOLIC PANEL: CPT

## 2019-11-01 PROCEDURE — 86140 C-REACTIVE PROTEIN: CPT

## 2019-11-01 PROCEDURE — 99285 PR EMERGENCY DEPT VISIT,LEVEL V: ICD-10-PCS | Mod: ,,, | Performed by: EMERGENCY MEDICINE

## 2019-11-01 PROCEDURE — 99283 EMERGENCY DEPT VISIT LOW MDM: CPT

## 2019-11-01 PROCEDURE — 84550 ASSAY OF BLOOD/URIC ACID: CPT

## 2019-11-01 PROCEDURE — 85025 COMPLETE CBC W/AUTO DIFF WBC: CPT

## 2019-11-01 RX ORDER — INDOMETHACIN 50 MG/1
50 CAPSULE ORAL
Status: DISCONTINUED | OUTPATIENT
Start: 2019-11-01 | End: 2019-11-01

## 2019-11-01 RX ORDER — COLCHICINE 0.6 MG/1
0.6 TABLET, FILM COATED ORAL
Status: COMPLETED | OUTPATIENT
Start: 2019-11-01 | End: 2019-11-01

## 2019-11-01 RX ORDER — COLCHICINE 0.6 MG/1
0.6 TABLET, FILM COATED ORAL
Status: DISCONTINUED | OUTPATIENT
Start: 2019-11-01 | End: 2019-11-01

## 2019-11-01 RX ADMIN — COLCHICINE 0.6 MG: 0.6 TABLET, FILM COATED ORAL at 10:11

## 2019-11-01 NOTE — ED TRIAGE NOTES
Pt presents with chronic right shoulder pain described as sharp and increases with movement. Pt states Rx medication is not working.

## 2019-11-01 NOTE — DISCHARGE INSTRUCTIONS
You were evaluated in the ED today for right shoulder pain. You were given 1 dose of colchicine and we strongly recommend that you follow up with your orthopaedic doctor tomorrow. Please feel free to come back to the ED should you develop new onset numbness or tingling.

## 2019-11-01 NOTE — ED PROVIDER NOTES
"Encounter Date: 11/1/2019       History     Chief Complaint   Patient presents with    Shoulder Pain     seen in er 3d in row, gout in shoulder wanting new meds     The pt is a 70 yo M that returns to the ED for evaluation of right shoulder pain and right sided neck pain that radiates to the head. He states that the pain started 3 days ago when he fell trying to get into a wheelchair. States that he forgot to lock the wheels and landed on his back. States he has taken Vicodin and that this has not helped (has this prescription for chronic back pain). States the pain feels like throbbing and is sharp and points to his clavicle. Denies any relief. Has tried robaxin and that has not helped as well. Reports that he believes it may be gout. Has not tried an anti-inflammatory. "I would like to try to start some colchicine". Has tried allopurinol and reports that he has been taking it daily for over a year. Takes 4 Vicodin a day. Does not take any anti-inflammatories on a regular basis. History of gout in his right leg- states in his toes and calf.         Review of patient's allergies indicates:   Allergen Reactions    Iodine containing multivitamin Swelling     itching    Keflex [cephalexin] Swelling     Eyes.  Tolerated multiple doses of zosyn and 1 dose of augmentin in 2015 and 2016, respectively    Peaches [peach (prunus persica)] Swelling     eyes    Shellfish containing products Swelling    Fig tree Swelling     itching    Tuberculin spenser test ppd Rash     Past Medical History:   Diagnosis Date    AICD (automatic cardioverter/defibrillator) present 12/13/2015      Anticoagulant long-term use     CHF (congestive heart failure)     Chronic anticoagulation 5/5/2016    Chronic combined systolic and diastolic heart failure 11/26/2012    EF 10-20% on ECHO 2013    Clotting disorder     Coronary artery disease involving native coronary artery of native heart without angina pectoris 11/26/2012    Cath " 10- Stents patent non-obstructive disease Cath [id number] non-obstructive disease     Diverticulosis of colon     DVT (deep venous thrombosis), unspecified laterality 11/12/2015    Essential hypertension 11/15/2015    Hyperlipidemia     Hypertensive heart disease with heart failure 5/5/2016    MI (myocardial infarction) 2009    x's 5    Nicotine abuse     Obesity 11/26/2012    Pulmonary embolism 2011    Renal disorder     LAVERNE    Stented coronary artery 11/26/2012    LAD stent placed 10/17/2007      Past Surgical History:   Procedure Laterality Date    APPENDECTOMY      BACK SURGERY      CARDIAC DEFIBRILLATOR PLACEMENT      CARDIAC SURGERY  2007    stent    CARPAL TUNNEL RELEASE Right 04/2018    INSERTION OF BIVENTRICULAR IMPLANTABLE CARDIOVERTER-DEFIBRILLATOR (ICD) N/A 5/3/2019    Procedure: INSERTION, ICD, BIVENTRICULAR;  Surgeon: Teofilo Pal MD;  Location: [facility] EP LAB;  Service: Cardiology;  Laterality: N/A;  ICH CM,  ICD UPGD BI-V,  SJM, MAC, FAS, 3PREP (dual ICD INSITU)    r knee scope      REVISION OF SKIN POCKET FOR CARDIOVERTER-DEFIBRILLATOR Left 5/3/2019    Procedure: Revision, Skin Pocket, For Cardioverter-Defibrillator;  Surgeon: Teofilo Pal MD;  Location: [facility] EP LAB;  Service: Cardiology;  Laterality: Left;    SPINE SURGERY      TONSILLECTOMY      TRIGGER FINGER RELEASE Right 04/2018    thumb     Family History   Problem Relation Age of Onset    Cancer Mother         colon cancer    Heart disease Mother     Diabetes Mother     Heart disease Father     Stroke Father     Colon polyps Father     Cancer Paternal Grandmother         leukemia    No Known Problems Sister     No Known Problems Daughter     No Known Problems Son     No Known Problems Sister     No Known Problems Son      Social History     Tobacco Use    Smoking status: Former Smoker     Packs/day: 1.00     Years: 45.00     Pack years: 45.00     Types: Cigarettes     Last attempt to quit:  2005     Years since quittin.8    Smokeless tobacco: Never Used    Tobacco comment: 1-1.5 ppd every day.   Substance Use Topics    Alcohol use: No     Frequency: Never    Drug use: No     Review of Systems   Constitutional: Negative for chills and fever.   HENT: Negative for congestion and rhinorrhea.    Eyes: Negative for photophobia and visual disturbance.   Respiratory: Negative for cough and shortness of breath.    Cardiovascular: Negative for chest pain and palpitations.   Gastrointestinal: Negative for diarrhea, nausea and vomiting.   Genitourinary: Negative for dysuria and hematuria.   Musculoskeletal: Positive for arthralgias (pain in right shoulder), back pain and neck pain (radiating up from shoulder). Negative for gait problem, joint swelling, myalgias and neck stiffness.   Skin: Negative for color change, pallor, rash and wound.   Neurological: Positive for light-headedness and headaches. Negative for dizziness, seizures and syncope.   Psychiatric/Behavioral: Negative for confusion.       Physical Exam     Initial Vitals [19 0832]   BP Pulse Resp Temp SpO2   (!) 157/87 80 18 98.2 °F (36.8 °C) 96 %      MAP       --         Physical Exam    Nursing note and vitals reviewed.  Constitutional: He appears well-developed and well-nourished. He is not diaphoretic. No distress.   Obese Male, appears uncomfortable   HENT:   Head: Normocephalic and atraumatic.   Right Ear: External ear normal.   Left Ear: External ear normal.   Nose: Nose normal.   Eyes: EOM are normal. No scleral icterus.   Neck: Normal range of motion. Neck supple. No tracheal deviation present.   Cardiovascular: Normal rate, regular rhythm and intact distal pulses. Exam reveals gallop.    No murmur heard.  Pulmonary/Chest: Breath sounds normal. No stridor. He has no wheezes. He has no rhonchi. He has no rales.   Abdominal: Soft. Bowel sounds are normal. He exhibits no distension. There is no tenderness. There is no  guarding.   Musculoskeletal: He exhibits no edema or tenderness.   No step off sign on the right shoulder  Point tenderness over the acromion process   No point tenderness over the cervical spine  Increased calor to right shoulder       Neurological: He is alert and oriented to person, place, and time. No sensory deficit. GCS score is 15. GCS eye subscore is 4. GCS verbal subscore is 5. GCS motor subscore is 6.   Skin: Skin is warm and dry. No rash noted. No erythema.   No skin breakdown overlying the shoulder or neck         ED Course   Procedures  Labs Reviewed   CBC W/ AUTO DIFFERENTIAL - Abnormal; Notable for the following components:       Result Value    Mean Corpuscular Hemoglobin Conc 31.3 (*)     RDW 15.3 (*)     Gran # (ANC) 8.5 (*)     Immature Grans (Abs) 0.05 (*)     Gran% 77.2 (*)     Lymph% 13.8 (*)     All other components within normal limits   COMPREHENSIVE METABOLIC PANEL - Abnormal; Notable for the following components:    Glucose 130 (*)     AST 51 (*)     ALT 45 (*)     All other components within normal limits   SEDIMENTATION RATE - Abnormal; Notable for the following components:    Sed Rate 39 (*)     All other components within normal limits   C-REACTIVE PROTEIN - Abnormal; Notable for the following components:    CRP 10.9 (*)     All other components within normal limits   URIC ACID - Abnormal; Notable for the following components:    Uric Acid 8.7 (*)     All other components within normal limits          Imaging Results    None          Medical Decision Making:   History:   Old Medical Records: I decided to obtain old medical records.  Old Records Summarized: other records, records from clinic visits and records from previous admission(s).       <> Summary of Records: Recent ED visits for right shoulder pain s/p ground level fall. Workup negative for acute fracture/dislocation.   Initial Assessment:   The pt is 68 yo M that presents to the ED for re-evaluation of right shoulder pain s/p  ground level fall.   Differential Diagnosis:   DDX includes but is not limited to:  Shoulder dislocation  Fracture to clavicle  Fracture to neck (low suspicion as there is no point tenderness to the cervical spine)   Tendonitis   Gout Flare    I do not suspect meningitis as pt w/o nuchal rigidity  Clinical Tests:   Lab Tests: Ordered and Reviewed  ED Management:  Labs show elevated uric acid, may represent gout flare.  Patient given 1 dose of colchicine, however he does have history of amlodipine use.  Patient does have appointment with his orthopedist tomorrow and recommend further pain management per orthopedist.       APC / Resident Notes:   Patient reevaluated by PGY-1  10:30 am- Patient updated regarding lab work. Patient given 1 dose of colchicine. Patient agreeable to plan for follow up with orthopaedics tomorrow.          Attending Attestation:   Physician Attestation Statement for Resident:  As the supervising MD   Physician Attestation Statement: I have personally seen and examined this patient.   I agree with the above history. -: 69-year-old male with past medical history of gout, MI s/p stent placement and AICD, prior DVT/PE not currently on anticoagulants, HFrEF (last EF of 20% in 1/2019), chronic back pain, , presenting for 3rd visit in 3 days for right shoulder pain. Patient reports pain started 4 days ago after mechanical fall in which he landed on his back (had been sitting in his mother's wheelchair), no relief from Vicodin (which he takes for chronic back pain), or Robaxin which he was recently prescribed.  Patient does feel his symptoms may be gout, takes allopurinol daily for the past year but requesting to start colchicine.    Patient had x-ray of shoulder and clavicle which was negative, tenderness at that exam limited to acromion and AC joint.  EKG negative.  Given Robaxin and Tylenol,     As the supervising MD I agree with the above PE.   -:   On exam patient is in nontoxic appearing, no  nuchal rigidity, mild pain distress, right shoulder warm compared to left and very tender to palpation over distal AC, however no swelling or step-off, no midline tenderness concerning for cervical fracture, CSM intact distally.     As the supervising MD I agree with the above treatment, course, plan, and disposition.   -: Patient is concerned that he may have gout flare, reports pain is similar, although less likely given acute onset of pain after fall.  No systemic symptoms concerning for septic joint, and also less likely given acute pain after fall, however will obtain CBC, CMP, ESR, CRP as well as uric acid.    Patient has history of renal disease, will avoid heavy dosing of NSAIDs, however may tolerate colchicine which he is requesting which she states has helped his gout flares in the past.  He does have an appoint with his orthopedist tomorrow.  Patient takes amlodipine, for which concurrent colchicine administration is contraindicated.  Will give patient 1 small dose of colchicine, recommend follow-up with orthopedist tomorrow for further management.  I recommended patient to sling and swathe his shoulder and follow up outpatient.    I have reviewed and agree with the residents interpretation of the following: lab data and x-rays.  I have reviewed the following: old records at this facility, records from a referring facility and old x-rays.                       Clinical Impression:       ICD-10-CM ICD-9-CM   1. Acute gout of right shoulder, unspecified cause M10.9 274.01   2. Acute pain of right shoulder M25.511 719.41                                Jordan Munoz DO  Resident  11/01/19 1108       Serene Gill MD  11/01/19 [id number]

## 2019-11-01 NOTE — ED NOTES
Pt's first and last name and birthday confirmed.   LOC: The patient is awake, alert and aware of environment with an appropriate affect, the patient is oriented x 3 and speaking appropriately.  APPEARANCE: Patient resting comfortably and in no acute distress, patient is clean and well groomed.  SKIN: The skin is warm and dry, patient has normal skin turgor and moist mucus membranes, skin intact, no breakdown or brusing noted.  MUSKULOSKELETAL:  Right shoulder pain that worsens with movement;  no obvious swelling or deformities noted.  RESPIRATORY: Airway is open and patent, respirations are spontaneous, patient has a normal effort and rate.   CARDIAC:  No peripheral edema.  ABDOMEN:  no distention noted.   NEURO: No neuro deficits, hand grasp equal,  no facial droop noted. Speech is clear.

## 2019-11-02 RX ORDER — COLCHICINE 0.6 MG/1
TABLET, FILM COATED ORAL
Qty: 20 TABLET | Refills: 0 | Status: SHIPPED | OUTPATIENT
Start: 2019-11-02 | End: 2019-12-23

## 2019-11-04 ENCOUNTER — OFFICE VISIT (OUTPATIENT)
Dept: INTERNAL MEDICINE | Facility: CLINIC | Age: 69
End: 2019-11-04
Payer: MEDICARE

## 2019-11-04 ENCOUNTER — TELEPHONE (OUTPATIENT)
Dept: INTERNAL MEDICINE | Facility: CLINIC | Age: 69
End: 2019-11-04

## 2019-11-04 VITALS
SYSTOLIC BLOOD PRESSURE: 108 MMHG | WEIGHT: 235 LBS | DIASTOLIC BLOOD PRESSURE: 70 MMHG | HEIGHT: 67 IN | BODY MASS INDEX: 36.88 KG/M2

## 2019-11-04 DIAGNOSIS — M54.9 CHRONIC BACK PAIN, UNSPECIFIED BACK LOCATION, UNSPECIFIED BACK PAIN LATERALITY: ICD-10-CM

## 2019-11-04 DIAGNOSIS — G89.29 CHRONIC BACK PAIN, UNSPECIFIED BACK LOCATION, UNSPECIFIED BACK PAIN LATERALITY: ICD-10-CM

## 2019-11-04 DIAGNOSIS — M25.511 ACUTE PAIN OF RIGHT SHOULDER: ICD-10-CM

## 2019-11-04 DIAGNOSIS — I10 ESSENTIAL HYPERTENSION: ICD-10-CM

## 2019-11-04 DIAGNOSIS — R74.8 ELEVATED LIVER ENZYMES: Primary | ICD-10-CM

## 2019-11-04 PROCEDURE — 3074F SYST BP LT 130 MM HG: CPT | Mod: CPTII,S$GLB,, | Performed by: PHYSICIAN ASSISTANT

## 2019-11-04 PROCEDURE — 3074F PR MOST RECENT SYSTOLIC BLOOD PRESSURE < 130 MM HG: ICD-10-PCS | Mod: CPTII,S$GLB,, | Performed by: PHYSICIAN ASSISTANT

## 2019-11-04 PROCEDURE — 1101F PR PT FALLS ASSESS DOC 0-1 FALLS W/OUT INJ PAST YR: ICD-10-PCS | Mod: CPTII,S$GLB,, | Performed by: PHYSICIAN ASSISTANT

## 2019-11-04 PROCEDURE — 3078F DIAST BP <80 MM HG: CPT | Mod: CPTII,S$GLB,, | Performed by: PHYSICIAN ASSISTANT

## 2019-11-04 PROCEDURE — 99999 PR PBB SHADOW E&M-EST. PATIENT-LVL IV: CPT | Mod: PBBFAC,,, | Performed by: PHYSICIAN ASSISTANT

## 2019-11-04 PROCEDURE — 99214 OFFICE O/P EST MOD 30 MIN: CPT | Mod: S$GLB,,, | Performed by: PHYSICIAN ASSISTANT

## 2019-11-04 PROCEDURE — 99999 PR PBB SHADOW E&M-EST. PATIENT-LVL IV: ICD-10-PCS | Mod: PBBFAC,,, | Performed by: PHYSICIAN ASSISTANT

## 2019-11-04 PROCEDURE — 3078F PR MOST RECENT DIASTOLIC BLOOD PRESSURE < 80 MM HG: ICD-10-PCS | Mod: CPTII,S$GLB,, | Performed by: PHYSICIAN ASSISTANT

## 2019-11-04 PROCEDURE — 1101F PT FALLS ASSESS-DOCD LE1/YR: CPT | Mod: CPTII,S$GLB,, | Performed by: PHYSICIAN ASSISTANT

## 2019-11-04 PROCEDURE — 99214 PR OFFICE/OUTPT VISIT, EST, LEVL IV, 30-39 MIN: ICD-10-PCS | Mod: S$GLB,,, | Performed by: PHYSICIAN ASSISTANT

## 2019-11-04 NOTE — PATIENT INSTRUCTIONS
Gout    Gout is an inflammation of a joint due to a build-up of gout crystals in the joint fluid. This occurs when there is an excess of uric acid (a normal waste product) in the body. Uric acid builds up in the body when the kidneys are unable to filter enough of it from the blood. This may occur with age. It is also associated with kidney disease. Gout occurs more often in people with obesity, diabetes, high blood pressure, or high levels of fats in the blood. It may run in families. Gout tends to come and go. A flare up of gout is called an attack. Drinking alcohol or eating certain foods (such as shellfish or foods with additives such as high-fructose corn syrup) may increase uric acid levels in the blood and cause a gout attack.  During a gout attack, the affected joint may become a hot, red, swollen and painful. If you have had one attack of gout, you are likely to have another. An attack of gout can be treated with medicine. If these attacks become frequent, a daily medicine may be prescribed to help the kidneys remove uric acid from the body.  Home care  During a gout attack:  · Rest painful joints. If gout affects the joints of your foot or leg, you may want to use crutches for the first few days to keep from bearing weight on the affected joint.  · When sitting or lying down, raise the painful joint to a level higher than your heart.  · Apply an ice pack (ice cubes in a plastic bag wrapped in a thin towel) over the injured area for 20 minutes every 1 to 2 hours the first day for pain relief. Continue this 3 to 4 times a day for swelling and pain.  · Avoid alcohol and foods listed below (see Preventing attacks) during a gout attack. Drink extra fluid to help flush the uric acid through your kidneys.  · If you were prescribed a medicine to treat gout, take it as your healthcare provider has instructed. Don't skip doses.  · Take anti-inflammatory medicine as directed.   · If pain medicines have been  prescribed, take them exactly as directed.    Preventing attacks  · Minimize or avoid alcohol use. Excess alcohol intake can cause a gout attack.  · Limit these foods and beverages:  ¨ Organ meats, such as kidneys and liver  ¨ Certain seafoods (anchovies, sardines, shrimp, scallops, herring, mackerel)  ¨ Wild game, meat extracts and meat gravies  ¨ Foods and beverages sweetened with high-fructose corn syrup, such as sodas  · Eat a healthy diet including low-fat and nonfat dairy, whole grains, and vegetables.  · If you are overweight, talk to your healthcare provider about a weight reduction plan. Avoid fasting or extreme low calorie diets (less than 900 calories per day). This will increase uric acid levels in the body.  · If you have diabetes or high blood pressure, work with your doctor to manage these conditions.  · Protect the joint from injury. Trauma can trigger a gout attack.  Follow-up care  Follow up with your healthcare provider, or as advised.   When to seek medical advice  Call your healthcare provider if you have any of the following:  · Fever over 100.4°F (38.ºC) with worsening joint pain  · Increasing redness around the joint  · Pain developing in another joint  · Repeated vomiting, abdominal pain, or blood in the vomit or stool (black or red color)  Date Last Reviewed: 3/1/2017  © 7392-2510 [employer]. [address] [place] [address], [place] [address]. All rights reserved. This information is not intended as a substitute for professional medical care. Always follow your healthcare professional's instructions.

## 2019-11-04 NOTE — PROGRESS NOTES
Subjective:       Patient ID: Audrey Oenil Jr. is a 69 y.o. male.        Chief Complaint: Follow-up    Audrey Oneil Jr. is an established patient of January Khan MD here today for ED f/u visit.    Seen in ED 3 times last week.  His shoulder is doing MUCH better.  Pain and mobility are both much improved s/p tx with colchicine.      10/29 - fell out of his mother's wheelchair on 10/28 in the morning, he was sitting in the wheelchair when it rolled and he fell on his back, no LOC, but does have right paraspinal neck pain and right shoulder pain now that is not resolved with toradol or norco 10, pain is 7/10 with movement and 0/10 at rest, unable to fully abduct his right shoulder,     10/30 - returned to ED secondary to to continued pain and he began to get worried he could have a DVT/PE as he had a h/o this    11/1 - returned to ED secondary to continued pain, at this point his sx felt consistent with prior gout, given colchicine to take, also had lab work (CBC, CMP, sed rate, CRP, uric acid), liver enzymes mildly elevated, uric acid level elevated, sed rate and CRP mildly elevated but similar to a year ago    Chronic back pain - followed by Dr. Dc Valenzuela (orthopedist), taking norco 10/325 every 6 hours prn, has appointment today for follow up    CAD with HF (hx STEMI s/p PCI LAD 2007, most recent echo with sysolic and diastolic dysfunction with EF 10-15%, s/p AICD with hx VT, recent RHC with normal R and L sided filling pressures, low cardiac output/index, normal pulm pressures) - followed by Dr. Marshall/Cards    HTN - /70    CKD3    Lipids - taking lipitor 80 mg daily    H/o DVT and PE in 2007 (prev completed course of Coumadinn x 1 year per cards note)           Review of Systems   Constitutional: Negative for appetite change, chills, fatigue and fever.   HENT: Negative for congestion and sore throat.    Eyes: Negative for visual disturbance.   Respiratory: Negative for cough, chest tightness  and shortness of breath.    Cardiovascular: Negative for chest pain, palpitations and leg swelling.   Gastrointestinal: Negative for abdominal pain, blood in stool, constipation, diarrhea, nausea and vomiting.   Genitourinary: Negative for dysuria, frequency, hematuria and urgency.   Musculoskeletal: Positive for arthralgias (right shoulder - much improved). Negative for back pain.   Skin: Negative for rash.   Neurological: Negative for dizziness, syncope, weakness and headaches.   Psychiatric/Behavioral: Negative for dysphoric mood and sleep disturbance. The patient is not nervous/anxious.        Objective:      Physical Exam   Constitutional: He appears well-developed and well-nourished.   HENT:   Head: Normocephalic.   Right Ear: External ear normal.   Left Ear: External ear normal.   Mouth/Throat: Oropharynx is clear and moist.   Eyes: Pupils are equal, round, and reactive to light.   Cardiovascular: Normal rate, regular rhythm and normal heart sounds. Exam reveals no gallop and no friction rub.   No murmur heard.  Pulmonary/Chest: Effort normal and breath sounds normal. No respiratory distress.   Abdominal: Soft. There is no tenderness. There is no CVA tenderness.   Musculoskeletal: He exhibits no edema.        Right shoulder: He exhibits tenderness. He exhibits normal range of motion (full range of motion today but painful upon abducting > 90 degrees), no swelling, no spasm, normal pulse and normal strength.   Neurological: He is alert.   Skin: Skin is warm and dry.   Psychiatric: He has a normal mood and affect.   Nursing note and vitals reviewed.      Assessment:       1. Elevated liver enzymes    2. Acute pain of right shoulder    3. Essential hypertension    4. Chronic back pain, unspecified back location, unspecified back pain laterality        Plan:       Audrey was seen today for follow-up.    Diagnoses and all orders for this visit:    Elevated liver enzymes - check labs and US  -     US Abdomen  "Complete; Future  -     Comprehensive metabolic panel; Future  -     Hepatitis panel, acute; Future    Acute pain of right shoulder - doing much better s/p tx with colchicine    Essential hypertension - stable and controlled    Chronic back pain, unspecified back location, unspecified back pain laterality - taking norco every 6 hours prn, followed by outside orthopedist    He has not seen Dr. Khan in over a year - recommend scheduling f/u in 4-6 weeks.    Pt has been given instructions populated from [employer] database and has verbalized understanding of the after visit summary and information contained wherein.    Follow up with a primary care provider. May go to ER for acute shortness of breath, lightheadedness, fever, or any other emergent complaints or changes in condition.    "This note will be shared with the patient"    Future Appointments   Date Time Provider Department Center   11/4/2019  3:30 PM Kerry Estrella PA-C [facility] MICHAEL Sanchez Located within [facility]   11/12/2019 12:00 AM HOME MONITOR DEVICE CHECK, [facility] TREVON Sanchez   11/18/2019  8:15 AM LAB, SAME DAY [facility] LAB  [place] Sanchez W   11/18/2019  8:45 AM [facility] OIC-US1 MASTER [facility] ULTR [facility]   12/16/2019 10:00 AM HOME MONITOR DEVICE CHECK, [facility] TREVON Sanchez               "

## 2019-11-04 NOTE — TELEPHONE ENCOUNTER
----- Message from Haley Morgan sent at 11/4/2019  8:41 AM CST -----  Please contact patient for a follow-up from visit with Kerry Estrella in 3-4 weeks.  Thanks

## 2019-12-02 ENCOUNTER — IMMUNIZATION (OUTPATIENT)
Dept: INTERNAL MEDICINE | Facility: CLINIC | Age: 69
End: 2019-12-02
Payer: MEDICARE

## 2019-12-02 ENCOUNTER — OFFICE VISIT (OUTPATIENT)
Dept: INTERNAL MEDICINE | Facility: CLINIC | Age: 69
End: 2019-12-02
Payer: MEDICARE

## 2019-12-02 ENCOUNTER — TELEPHONE (OUTPATIENT)
Dept: TRANSPLANT | Facility: CLINIC | Age: 69
End: 2019-12-02

## 2019-12-02 VITALS
HEIGHT: 67 IN | OXYGEN SATURATION: 97 % | HEART RATE: 69 BPM | BODY MASS INDEX: 37.99 KG/M2 | SYSTOLIC BLOOD PRESSURE: 122 MMHG | DIASTOLIC BLOOD PRESSURE: 82 MMHG | WEIGHT: 242.06 LBS

## 2019-12-02 DIAGNOSIS — R73.03 PREDIABETES: ICD-10-CM

## 2019-12-02 DIAGNOSIS — M1A.9XX0 CHRONIC GOUT WITHOUT TOPHUS, UNSPECIFIED CAUSE, UNSPECIFIED SITE: ICD-10-CM

## 2019-12-02 DIAGNOSIS — I25.10 CORONARY ARTERY DISEASE INVOLVING NATIVE CORONARY ARTERY OF NATIVE HEART WITHOUT ANGINA PECTORIS: ICD-10-CM

## 2019-12-02 DIAGNOSIS — Z12.5 SCREENING PSA (PROSTATE SPECIFIC ANTIGEN): ICD-10-CM

## 2019-12-02 DIAGNOSIS — E78.5 HYPERLIPIDEMIA LDL GOAL <70: ICD-10-CM

## 2019-12-02 DIAGNOSIS — I50.42 CHRONIC COMBINED SYSTOLIC AND DIASTOLIC CONGESTIVE HEART FAILURE: Primary | ICD-10-CM

## 2019-12-02 DIAGNOSIS — Z12.11 SCREEN FOR COLON CANCER: ICD-10-CM

## 2019-12-02 DIAGNOSIS — E66.01 SEVERE OBESITY (BMI 35.0-39.9) WITH COMORBIDITY: Chronic | ICD-10-CM

## 2019-12-02 DIAGNOSIS — I25.5 CARDIOMYOPATHY, ISCHEMIC: Chronic | ICD-10-CM

## 2019-12-02 DIAGNOSIS — I70.0 ATHEROSCLEROSIS OF AORTA: ICD-10-CM

## 2019-12-02 DIAGNOSIS — N18.30 HYPERTENSIVE KIDNEY DISEASE WITH STAGE 3 CHRONIC KIDNEY DISEASE: ICD-10-CM

## 2019-12-02 DIAGNOSIS — M54.2 NECK PAIN, BILATERAL POSTERIOR: ICD-10-CM

## 2019-12-02 DIAGNOSIS — Z23 NEED FOR PNEUMOCOCCAL VACCINATION: ICD-10-CM

## 2019-12-02 DIAGNOSIS — I11.0 HYPERTENSIVE HEART DISEASE WITH HEART FAILURE: ICD-10-CM

## 2019-12-02 DIAGNOSIS — I12.9 HYPERTENSIVE KIDNEY DISEASE WITH STAGE 3 CHRONIC KIDNEY DISEASE: ICD-10-CM

## 2019-12-02 PROBLEM — M79.641 RIGHT HAND PAIN: Status: RESOLVED | Noted: 2017-11-26 | Resolved: 2019-12-02

## 2019-12-02 PROBLEM — D63.8 ANEMIA OF CHRONIC DISEASE: Status: RESOLVED | Noted: 2017-07-19 | Resolved: 2019-12-02

## 2019-12-02 PROBLEM — R07.9 CHEST PAIN: Status: RESOLVED | Noted: 2018-09-10 | Resolved: 2019-12-02

## 2019-12-02 PROBLEM — L03.113 CELLULITIS OF RIGHT UPPER EXTREMITY: Status: RESOLVED | Noted: 2018-11-28 | Resolved: 2019-12-02

## 2019-12-02 PROBLEM — R79.89 ELEVATED TROPONIN: Status: RESOLVED | Noted: 2018-06-15 | Resolved: 2019-12-02

## 2019-12-02 PROBLEM — R79.89 ELEVATED LFTS: Status: ACTIVE | Noted: 2019-12-02

## 2019-12-02 PROBLEM — R06.02 SOB (SHORTNESS OF BREATH): Status: RESOLVED | Noted: 2018-08-29 | Resolved: 2019-12-02

## 2019-12-02 PROBLEM — M1A.0720 IDIOPATHIC CHRONIC GOUT OF LEFT FOOT WITHOUT TOPHUS: Status: RESOLVED | Noted: 2017-10-12 | Resolved: 2019-12-02

## 2019-12-02 PROCEDURE — G0009 ADMIN PNEUMOCOCCAL VACCINE: HCPCS | Mod: S$GLB,,, | Performed by: INTERNAL MEDICINE

## 2019-12-02 PROCEDURE — 99214 PR OFFICE/OUTPT VISIT, EST, LEVL IV, 30-39 MIN: ICD-10-PCS | Mod: 25,S$GLB,, | Performed by: INTERNAL MEDICINE

## 2019-12-02 PROCEDURE — 3079F DIAST BP 80-89 MM HG: CPT | Mod: CPTII,S$GLB,, | Performed by: INTERNAL MEDICINE

## 2019-12-02 PROCEDURE — 90732 PPSV23 VACC 2 YRS+ SUBQ/IM: CPT | Mod: S$GLB,,, | Performed by: INTERNAL MEDICINE

## 2019-12-02 PROCEDURE — 1159F PR MEDICATION LIST DOCUMENTED IN MEDICAL RECORD: ICD-10-PCS | Mod: S$GLB,,, | Performed by: INTERNAL MEDICINE

## 2019-12-02 PROCEDURE — 90662 PR FLU VACCINE, INCREASED ANTIGEN, PRESV FREE: ICD-10-PCS | Mod: S$GLB,,, | Performed by: INTERNAL MEDICINE

## 2019-12-02 PROCEDURE — 1126F AMNT PAIN NOTED NONE PRSNT: CPT | Mod: S$GLB,,, | Performed by: INTERNAL MEDICINE

## 2019-12-02 PROCEDURE — 1126F PR PAIN SEVERITY QUANTIFIED, NO PAIN PRESENT: ICD-10-PCS | Mod: S$GLB,,, | Performed by: INTERNAL MEDICINE

## 2019-12-02 PROCEDURE — 1101F PR PT FALLS ASSESS DOC 0-1 FALLS W/OUT INJ PAST YR: ICD-10-PCS | Mod: CPTII,S$GLB,, | Performed by: INTERNAL MEDICINE

## 2019-12-02 PROCEDURE — 1159F MED LIST DOCD IN RCRD: CPT | Mod: S$GLB,,, | Performed by: INTERNAL MEDICINE

## 2019-12-02 PROCEDURE — 99214 OFFICE O/P EST MOD 30 MIN: CPT | Mod: 25,S$GLB,, | Performed by: INTERNAL MEDICINE

## 2019-12-02 PROCEDURE — G0008 ADMIN INFLUENZA VIRUS VAC: HCPCS | Mod: S$GLB,,, | Performed by: INTERNAL MEDICINE

## 2019-12-02 PROCEDURE — 99999 PR PBB SHADOW E&M-EST. PATIENT-LVL IV: ICD-10-PCS | Mod: PBBFAC,,, | Performed by: INTERNAL MEDICINE

## 2019-12-02 PROCEDURE — 90662 IIV NO PRSV INCREASED AG IM: CPT | Mod: S$GLB,,, | Performed by: INTERNAL MEDICINE

## 2019-12-02 PROCEDURE — 99499 RISK ADDL DX/OHS AUDIT: ICD-10-PCS | Mod: S$GLB,,, | Performed by: INTERNAL MEDICINE

## 2019-12-02 PROCEDURE — 90732 PNEUMOCOCCAL POLYSACCHARIDE VACCINE 23-VALENT =>2YO SQ IM: ICD-10-PCS | Mod: S$GLB,,, | Performed by: INTERNAL MEDICINE

## 2019-12-02 PROCEDURE — 99999 PR PBB SHADOW E&M-EST. PATIENT-LVL IV: CPT | Mod: PBBFAC,,, | Performed by: INTERNAL MEDICINE

## 2019-12-02 PROCEDURE — 1101F PT FALLS ASSESS-DOCD LE1/YR: CPT | Mod: CPTII,S$GLB,, | Performed by: INTERNAL MEDICINE

## 2019-12-02 PROCEDURE — G0009 PNEUMOCOCCAL POLYSACCHARIDE VACCINE 23-VALENT =>2YO SQ IM: ICD-10-PCS | Mod: S$GLB,,, | Performed by: INTERNAL MEDICINE

## 2019-12-02 PROCEDURE — 99499 UNLISTED E&M SERVICE: CPT | Mod: S$GLB,,, | Performed by: INTERNAL MEDICINE

## 2019-12-02 PROCEDURE — G0008 FLU VACCINE - HIGH DOSE (65+) PRESERVATIVE FREE IM: ICD-10-PCS | Mod: S$GLB,,, | Performed by: INTERNAL MEDICINE

## 2019-12-02 PROCEDURE — 3079F PR MOST RECENT DIASTOLIC BLOOD PRESSURE 80-89 MM HG: ICD-10-PCS | Mod: CPTII,S$GLB,, | Performed by: INTERNAL MEDICINE

## 2019-12-02 PROCEDURE — 3074F PR MOST RECENT SYSTOLIC BLOOD PRESSURE < 130 MM HG: ICD-10-PCS | Mod: CPTII,S$GLB,, | Performed by: INTERNAL MEDICINE

## 2019-12-02 PROCEDURE — 3074F SYST BP LT 130 MM HG: CPT | Mod: CPTII,S$GLB,, | Performed by: INTERNAL MEDICINE

## 2019-12-02 RX ORDER — ALLOPURINOL 100 MG/1
100 TABLET ORAL 2 TIMES DAILY
Qty: 180 TABLET | Refills: 3 | Status: SHIPPED | OUTPATIENT
Start: 2019-12-02 | End: 2020-12-11 | Stop reason: SDUPTHER

## 2019-12-02 NOTE — PROGRESS NOTES
INTERNAL MEDICINE ESTABLISHED PATIENT VISIT NOTE    Subjective:     Chief Complaint: Follow-up  mult med issues, CAD, HTN     Patient ID: Audrey Oneil Jr. is a 69 y.o. male with CAD c HF (hx STEMI s/p PCI LAD 2007, most recent echo c sys and diastolic dysfunction c EF 10-15%, s/p AICD c hx VT, recent RHC c normal R and L sided filling pressures, low cardiac output/index, normal pulm pressures) followed by Dr. Marshall/Ricky, HTN, CKD3, obesity c BMI 37, HLD, hx DVT and PE in 2007 (prev completed course of Coumadinn x 1 year per Cards note), GERD, chronic low back pain, anemia, last seen by me 7/2018, here today for f/u.    Since last appt, has had [facility] hospital visits and admissions including various joint pains, chest pain, sob.    8/2018: obs, sob.  VQ neg for PE, treated for HF  11/2018 admitted for cellulitis R wrist and hand.  1/2019 chest tightness and sob, diuresed.  2/2019 venogram  5/2019 BIV upgrade.  10/2019 s/p fall, xrays shoulder, no acute fx  11/2019 shoulder pain, treated c colchicine for gout.    Today states his shoulder pain is much better since treatment c colchicine.  Denies cp or sob.  Denies abd pain.  Denies blood in stools.        Past Medical History:  Past Medical History:   Diagnosis Date    AICD (automatic cardioverter/defibrillator) present 12/13/2015      Anticoagulant long-term use     CHF (congestive heart failure)     Chronic anticoagulation 5/5/2016    Chronic combined systolic and diastolic heart failure 11/26/2012    EF 10-20% on ECHO 2013    Clotting disorder     Coronary artery disease involving native coronary artery of native heart without angina pectoris 11/26/2012    Cath 10- Stents patent non-obstructive disease Cath 11-12015 non-obstructive disease     Diverticulosis of colon     DVT (deep venous thrombosis), unspecified laterality 11/12/2015    Essential hypertension 11/15/2015    Hyperlipidemia     Hypertensive heart disease with heart  failure 5/5/2016    MI (myocardial infarction) 2009    x's 5    Nicotine abuse     Obesity 11/26/2012    Pulmonary embolism 2011    Renal disorder     LAVERNE    Stented coronary artery 11/26/2012    LAD stent placed 10/17/2007        Home Medications:  Prior to Admission medications    Medication Sig Start Date End Date Taking? Authorizing Provider   allopurinol (ZYLOPRIM) 100 MG tablet TAKE 1 TABLET(100 MG) BY MOUTH EVERY DAY  Patient not taking: Reported on 11/4/2019 4/16/19   Migue Henry Jr., MD   amiodarone (PACERONE) 100 MG Tab Take 1 tablet (100 mg total) by mouth once daily.  Patient taking differently: Take 100 mg by mouth every evening.  4/16/19 4/15/20  Migue Henry Jr., MD   aspirin (ECOTRIN) 325 MG EC tablet Take 325 mg by mouth every evening.     Historical Provider, MD   atorvastatin (LIPITOR) 80 MG tablet TAKE 1 TABLET(80 MG) BY MOUTH EVERY DAY 10/14/19   Breezy Gongora MD   clopidogrel (PLAVIX) 75 mg tablet TAKE 1 TABLET(75 MG) BY MOUTH EVERY DAY 10/19/19   Breezy Gongora MD   COLCRYS 0.6 mg tablet TAKE 2 TABLETS BY MOUTH FOR 1 DAY THEN TAKE 1 TABLET BY MOUTH EVERY DAY THEREAFTER AS NEEDED 11/2/19   January Khan MD   docusate sodium (COLACE) 50 MG capsule Take 1 capsule (50 mg total) by mouth 2 (two) times daily.  Patient not taking: Reported on 11/4/2019 4/6/18   CANDIDO Novoa   furosemide (LASIX) 40 MG tablet Take 1 tablet (40 mg total) by mouth 2 (two) times daily.  Patient taking differently: Take 80 mg by mouth every evening.  4/16/19 4/15/20  Migue Henry Jr., MD   HYDROcodone-acetaminophen (NORCO)  mg per tablet Take 1 tablet by mouth every 6 (six) hours as needed for Pain.    Historical Provider, MD   metoprolol succinate (TOPROL-XL) 50 MG 24 hr tablet Take 1 tablet (50 mg total) by mouth once daily.  Patient taking differently: Take 50 mg by mouth every evening.  4/16/19   Migue Henry Jr., MD       Allergies:  Review of patient's allergies  indicates:   Allergen Reactions    Iodine containing multivitamin Swelling     itching    Keflex [cephalexin] Swelling     Eyes.  Tolerated multiple doses of zosyn and 1 dose of augmentin in 2015 and 2016, respectively    Peaches [peach (prunus persica)] Swelling     eyes    Shellfish containing products Swelling    Fig tree Swelling     itching    Tuberculin spenser test ppd Rash       Social History:  Social History     Tobacco Use    Smoking status: Former Smoker     Packs/day: 1.00     Years: 45.00     Pack years: 45.00     Types: Cigarettes     Last attempt to quit: 2005     Years since quittin.9    Smokeless tobacco: Never Used    Tobacco comment: 1-1.5 ppd every day.   Substance Use Topics    Alcohol use: No     Frequency: Never    Drug use: No        Review of Systems   Constitutional: Negative for appetite change, chills, fatigue, fever and unexpected weight change.   HENT: Negative for congestion, hearing loss and rhinorrhea.    Eyes: Negative for pain and visual disturbance.   Respiratory: Negative for cough, chest tightness, shortness of breath and wheezing.    Cardiovascular: Negative for chest pain, palpitations and leg swelling.   Gastrointestinal: Negative for abdominal distention and abdominal pain.   Endocrine: Negative for polydipsia and polyuria.   Genitourinary: Negative for decreased urine volume, discharge, dysuria and frequency.   Musculoskeletal: Positive for arthralgias.   Neurological: Negative for dizziness, weakness, numbness and headaches.   Psychiatric/Behavioral: Negative for behavioral problems and confusion.         Health Maintenance:     Immunizations:   Influenza vacc 10/2017  TDap is up to date 2016  Pneumovax , Prevnar , will give pvax booster today.  Zostavax 10/2017     Cancer Screening:  Colonoscopy: is not up to date. Ordered prev by other MD but says he was awaiting cards clearance prior to procedure since on asa and plavix; agreed to FIT  "testing, ordered in Jan, advised to turn in  PSA 7/2017 wnl    Objective:   /82 (BP Location: Left arm, Patient Position: Sitting, BP Method: Large (Manual))   Pulse 69   Ht 5' 7" (1.702 m)   Wt 109.8 kg (242 lb 1 oz)   SpO2 97%   BMI 37.91 kg/m²        General: AAO x3, no apparent distress  HEENT: PERRL, OP clear  CV: RRR, no m/r/g  Pulm: Lungs CTAB, no crackles, no wheezes  Abd: s/NT/ND +BS  Extremities: no c/c/e    Labs:     Lab Results   Component Value Date    WBC 10.99 11/01/2019    HGB 15.3 11/01/2019    HCT 48.9 11/01/2019    MCV 92 11/01/2019     11/01/2019     Sodium   Date Value Ref Range Status   11/01/2019 143 136 - 145 mmol/L Final     Potassium   Date Value Ref Range Status   11/01/2019 3.8 3.5 - 5.1 mmol/L Final     Chloride   Date Value Ref Range Status   11/01/2019 102 95 - 110 mmol/L Final     CO2   Date Value Ref Range Status   11/01/2019 27 23 - 29 mmol/L Final     Glucose   Date Value Ref Range Status   11/01/2019 130 (H) 70 - 110 mg/dL Final     BUN, Bld   Date Value Ref Range Status   11/01/2019 19 8 - 23 mg/dL Final     Creatinine   Date Value Ref Range Status   11/01/2019 1.0 0.5 - 1.4 mg/dL Final     Calcium   Date Value Ref Range Status   11/01/2019 8.9 8.7 - 10.5 mg/dL Final     Total Protein   Date Value Ref Range Status   11/01/2019 7.0 6.0 - 8.4 g/dL Final     Albumin   Date Value Ref Range Status   11/01/2019 3.9 3.5 - 5.2 g/dL Final     Total Bilirubin   Date Value Ref Range Status   11/01/2019 0.9 0.1 - 1.0 mg/dL Final     Comment:     For infants and newborns, interpretation of results should be based  on gestational age, weight and in agreement with clinical  observations.  Premature Infant recommended reference ranges:  Up to 24 hours.............<8.0 mg/dL  Up to 48 hours............<12.0 mg/dL  3-5 days..................<15.0 mg/dL  6-29 days.................<15.0 mg/dL       Alkaline Phosphatase   Date Value Ref Range Status   11/01/2019 118 55 - 135 U/L " Final     AST   Date Value Ref Range Status   11/01/2019 51 (H) 10 - 40 U/L Final     ALT   Date Value Ref Range Status   11/01/2019 45 (H) 10 - 44 U/L Final     Anion Gap   Date Value Ref Range Status   11/01/2019 14 8 - 16 mmol/L Final     eGFR if    Date Value Ref Range Status   11/01/2019 >60.0 >60 mL/min/1.73 m^2 Final     eGFR if non    Date Value Ref Range Status   11/01/2019 >60.0 >60 mL/min/1.73 m^2 Final     Comment:     Calculation used to obtain the estimated glomerular filtration  rate (eGFR) is the CKD-EPI equation.        Lab Results   Component Value Date    HGBA1C 5.9 (H) 01/15/2019     Lab Results   Component Value Date    LDLCALC 66.4 08/01/2018     Lab Results   Component Value Date    TSH 0.833 05/04/2018         Assessment/Plan     Audrey was seen today for follow-up.    Diagnoses and all orders for this visit:    Chronic combined systolic and diastolic congestive heart failure  Coronary artery disease involving native coronary artery of native heart without angina pectoris  Cardiomyopathy, ischemic  Asymptomatic and euvolemic today  Cont asa, plavix, toprol, atorvastatin and lasix as per Cards.  Needs updated lipids, will check c labs on Wed.  Due for f/u c Dr. Marshall, will assist c scheduling.  -     Ambulatory referral to Cardiology    Essential hypertension  Hypertensive heart disease with heart failure  CKD3  At goal, CKD stable.  Cont meds  -     Ambulatory referral to Cardiology    Hyperlipidemia LDL goal <70  Lab Results   Component Value Date    LDLCALC 66.4 08/01/2018     At goal on last check, needs updated labs  -     Lipid panel; Future    Atherosclerosis of aorta  No issues, cont bp and lipid control    Prediabetes  Lab Results   Component Value Date    HGBA1C 5.9 (H) 01/15/2019     Pt was counseled on the need to avoid intake of simple sugars and minimize carbohydrates.  Also recommended weight loss and daily exercise.  Will repeat labs now.  -      Hemoglobin A1c; Future    Severe obesity (BMI 35.0-39.9) with comorbidity  Counseled extensively on need for diet changes and daily exercise.  Discussed examples of healthy eating.  Recommend at least 30 minutes of exercise at least 5 days a week.      Chronic gout without tophus, unspecified cause, unspecified site  Acute flare resolved, will increase allopurinol to bid based on recent uric acid level  -     Sedimentation rate; Future  -     C-reactive protein; Future  -     allopurinol (ZYLOPRIM) 100 MG tablet; Take 1 tablet (100 mg total) by mouth 2 (two) times daily.    Elevated LFTs  Possibly due to Norco as managed by Dr. Dc Valenzuela  Does not drink EtOH  Has repeat labs c u/s scheduled by Kerry Estrella    Screening PSA (prostate specific antigen)  -     PSA, Screening; Future    Screen for colon cancer  -     Fecal Immunochemical Test (iFOBT); Future    Neck pain, bilateral posterior  Managed by Dr. Valenzuela as above    HM as above  RTC in 4 mos for f/u, sooner if needed.  All labs on Wednesday.    January Khan MD  Department of Internal Medicine - [facility]  12/02/2019

## 2019-12-04 ENCOUNTER — HOSPITAL ENCOUNTER (OUTPATIENT)
Dept: RADIOLOGY | Facility: HOSPITAL | Age: 69
Discharge: HOME | End: 2019-12-04
Attending: PHYSICIAN ASSISTANT
Payer: MEDICARE

## 2019-12-04 DIAGNOSIS — R74.8 ELEVATED LIVER ENZYMES: ICD-10-CM

## 2019-12-04 PROCEDURE — 76700 US EXAM ABDOM COMPLETE: CPT | Mod: 26,,, | Performed by: RADIOLOGY

## 2019-12-04 PROCEDURE — 76700 US EXAM ABDOM COMPLETE: CPT | Mod: TC

## 2019-12-04 PROCEDURE — 76700 US ABDOMEN COMPLETE: ICD-10-PCS | Mod: 26,,, | Performed by: RADIOLOGY

## 2019-12-09 ENCOUNTER — TELEPHONE (OUTPATIENT)
Dept: INTERNAL MEDICINE | Facility: CLINIC | Age: 69
End: 2019-12-09

## 2019-12-23 DIAGNOSIS — M1A.0720 IDIOPATHIC CHRONIC GOUT OF LEFT FOOT WITHOUT TOPHUS: ICD-10-CM

## 2019-12-23 RX ORDER — COLCHICINE 0.6 MG/1
TABLET ORAL
Qty: 20 TABLET | Refills: 0 | Status: SHIPPED | OUTPATIENT
Start: 2019-12-23 | End: 2020-04-10

## 2020-01-12 RX ORDER — ATORVASTATIN CALCIUM 80 MG/1
TABLET, FILM COATED ORAL
Qty: 90 TABLET | Refills: 0 | Status: SHIPPED | OUTPATIENT
Start: 2020-01-12 | End: 2020-04-23 | Stop reason: SDUPTHER

## 2020-01-21 ENCOUNTER — CLINICAL SUPPORT (OUTPATIENT)
Dept: CARDIOLOGY | Facility: HOSPITAL | Age: 70
End: 2020-01-21
Payer: MEDICARE

## 2020-01-21 DIAGNOSIS — I25.5 ISCHEMIC CARDIOMYOPATHY: ICD-10-CM

## 2020-01-21 DIAGNOSIS — Z95.810 PRESENCE OF AUTOMATIC (IMPLANTABLE) CARDIAC DEFIBRILLATOR: ICD-10-CM

## 2020-01-21 PROCEDURE — 93295 CARDIAC DEVICE CHECK - REMOTE: ICD-10-PCS | Mod: ,,, | Performed by: INTERNAL MEDICINE

## 2020-01-21 PROCEDURE — 93295 DEV INTERROG REMOTE 1/2/MLT: CPT | Mod: ,,, | Performed by: INTERNAL MEDICINE

## 2020-01-21 PROCEDURE — 93296 REM INTERROG EVL PM/IDS: CPT | Performed by: INTERNAL MEDICINE

## 2020-01-28 RX ORDER — CLOPIDOGREL BISULFATE 75 MG/1
TABLET ORAL
Qty: 90 TABLET | Refills: 0 | Status: SHIPPED | OUTPATIENT
Start: 2020-01-28 | End: 2020-05-01 | Stop reason: SDUPTHER

## 2020-03-09 ENCOUNTER — HOSPITAL ENCOUNTER (EMERGENCY)
Facility: HOSPITAL | Age: 70
Discharge: HOME | End: 2020-03-09
Attending: EMERGENCY MEDICINE
Payer: MEDICARE

## 2020-03-09 VITALS
DIASTOLIC BLOOD PRESSURE: 76 MMHG | HEART RATE: 63 BPM | WEIGHT: 242 LBS | SYSTOLIC BLOOD PRESSURE: 128 MMHG | BODY MASS INDEX: 37.9 KG/M2 | RESPIRATION RATE: 16 BRPM | OXYGEN SATURATION: 95 % | TEMPERATURE: 98 F

## 2020-03-09 DIAGNOSIS — E87.70 HYPERVOLEMIA, UNSPECIFIED HYPERVOLEMIA TYPE: ICD-10-CM

## 2020-03-09 DIAGNOSIS — R07.9 CHEST PAIN: ICD-10-CM

## 2020-03-09 DIAGNOSIS — R06.02 SOB (SHORTNESS OF BREATH): Primary | ICD-10-CM

## 2020-03-09 LAB
ALBUMIN SERPL BCP-MCNC: 3.5 G/DL (ref 3.5–5.2)
ALP SERPL-CCNC: 100 U/L (ref 55–135)
ALT SERPL W/O P-5'-P-CCNC: 25 U/L (ref 10–44)
ANION GAP SERPL CALC-SCNC: 13 MMOL/L (ref 8–16)
AST SERPL-CCNC: 25 U/L (ref 10–40)
BASOPHILS # BLD AUTO: 0.05 K/UL (ref 0–0.2)
BASOPHILS NFR BLD: 0.8 % (ref 0–1.9)
BILIRUB SERPL-MCNC: 0.3 MG/DL (ref 0.1–1)
BNP SERPL-MCNC: 309 PG/ML (ref 0–99)
BUN SERPL-MCNC: 13 MG/DL (ref 8–23)
CALCIUM SERPL-MCNC: 8.3 MG/DL (ref 8.7–10.5)
CHLORIDE SERPL-SCNC: 108 MMOL/L (ref 95–110)
CO2 SERPL-SCNC: 25 MMOL/L (ref 23–29)
CREAT SERPL-MCNC: 0.9 MG/DL (ref 0.5–1.4)
DIFFERENTIAL METHOD: ABNORMAL
EOSINOPHIL # BLD AUTO: 0.2 K/UL (ref 0–0.5)
EOSINOPHIL NFR BLD: 2.3 % (ref 0–8)
ERYTHROCYTE [DISTWIDTH] IN BLOOD BY AUTOMATED COUNT: 14.5 % (ref 11.5–14.5)
EST. GFR  (AFRICAN AMERICAN): >60 ML/MIN/1.73 M^2
EST. GFR  (NON AFRICAN AMERICAN): >60 ML/MIN/1.73 M^2
GLUCOSE SERPL-MCNC: 86 MG/DL (ref 70–110)
HCT VFR BLD AUTO: 45.5 % (ref 40–54)
HGB BLD-MCNC: 14.4 G/DL (ref 14–18)
IMM GRANULOCYTES # BLD AUTO: 0.03 K/UL (ref 0–0.04)
IMM GRANULOCYTES NFR BLD AUTO: 0.5 % (ref 0–0.5)
LYMPHOCYTES # BLD AUTO: 2.2 K/UL (ref 1–4.8)
LYMPHOCYTES NFR BLD: 34.6 % (ref 18–48)
MCH RBC QN AUTO: 29.8 PG (ref 27–31)
MCHC RBC AUTO-ENTMCNC: 31.6 G/DL (ref 32–36)
MCV RBC AUTO: 94 FL (ref 82–98)
MONOCYTES # BLD AUTO: 0.6 K/UL (ref 0.3–1)
MONOCYTES NFR BLD: 8.9 % (ref 4–15)
NEUTROPHILS # BLD AUTO: 3.4 K/UL (ref 1.8–7.7)
NEUTROPHILS NFR BLD: 52.9 % (ref 38–73)
NRBC BLD-RTO: 0 /100 WBC
PLATELET # BLD AUTO: 199 K/UL (ref 150–350)
PMV BLD AUTO: 10.9 FL (ref 9.2–12.9)
POTASSIUM SERPL-SCNC: 3.4 MMOL/L (ref 3.5–5.1)
PROT SERPL-MCNC: 6.4 G/DL (ref 6–8.4)
RBC # BLD AUTO: 4.83 M/UL (ref 4.6–6.2)
SODIUM SERPL-SCNC: 146 MMOL/L (ref 136–145)
TROPONIN I SERPL DL<=0.01 NG/ML-MCNC: 0.07 NG/ML (ref 0–0.03)
WBC # BLD AUTO: 6.44 K/UL (ref 3.9–12.7)

## 2020-03-09 PROCEDURE — 84484 ASSAY OF TROPONIN QUANT: CPT

## 2020-03-09 PROCEDURE — 93010 ELECTROCARDIOGRAM REPORT: CPT | Mod: ,,, | Performed by: INTERNAL MEDICINE

## 2020-03-09 PROCEDURE — 63600175 PHARM REV CODE 636 W HCPCS: Performed by: STUDENT IN AN ORGANIZED HEALTH CARE EDUCATION/TRAINING PROGRAM

## 2020-03-09 PROCEDURE — 83880 ASSAY OF NATRIURETIC PEPTIDE: CPT

## 2020-03-09 PROCEDURE — 93005 ELECTROCARDIOGRAM TRACING: CPT

## 2020-03-09 PROCEDURE — 85025 COMPLETE CBC W/AUTO DIFF WBC: CPT

## 2020-03-09 PROCEDURE — 25000003 PHARM REV CODE 250: Performed by: STUDENT IN AN ORGANIZED HEALTH CARE EDUCATION/TRAINING PROGRAM

## 2020-03-09 PROCEDURE — 99285 EMERGENCY DEPT VISIT HI MDM: CPT | Mod: ,,, | Performed by: EMERGENCY MEDICINE

## 2020-03-09 PROCEDURE — 96374 THER/PROPH/DIAG INJ IV PUSH: CPT

## 2020-03-09 PROCEDURE — 99285 EMERGENCY DEPT VISIT HI MDM: CPT | Mod: 25

## 2020-03-09 PROCEDURE — 94761 N-INVAS EAR/PLS OXIMETRY MLT: CPT

## 2020-03-09 PROCEDURE — 80053 COMPREHEN METABOLIC PANEL: CPT

## 2020-03-09 PROCEDURE — 93010 EKG 12-LEAD: ICD-10-PCS | Mod: ,,, | Performed by: INTERNAL MEDICINE

## 2020-03-09 PROCEDURE — 99285 PR EMERGENCY DEPT VISIT,LEVEL V: ICD-10-PCS | Mod: ,,, | Performed by: EMERGENCY MEDICINE

## 2020-03-09 RX ORDER — ASPIRIN 325 MG
325 TABLET ORAL
Status: COMPLETED | OUTPATIENT
Start: 2020-03-09 | End: 2020-03-09

## 2020-03-09 RX ORDER — FUROSEMIDE 10 MG/ML
80 INJECTION INTRAMUSCULAR; INTRAVENOUS
Status: COMPLETED | OUTPATIENT
Start: 2020-03-09 | End: 2020-03-09

## 2020-03-09 RX ADMIN — ASPIRIN 325 MG ORAL TABLET 325 MG: 325 PILL ORAL at 03:03

## 2020-03-09 RX ADMIN — FUROSEMIDE 80 MG: 10 INJECTION, SOLUTION INTRAMUSCULAR; INTRAVENOUS at 03:03

## 2020-03-09 NOTE — DISCHARGE INSTRUCTIONS
Please follow-up with your PCP and cardiologist for re-evaluation of your heart failure.    Please continue to take your prescribed medications. Please return to the ED if you are having more shortness of breath, new chest pain, nausea, vomiting, weakness or dizziness

## 2020-03-09 NOTE — ED PROVIDER NOTES
Encounter Date: 3/9/2020       History     Chief Complaint   Patient presents with    Chest Pain     Chest tightness and increased shortness of breath for the past few days. On 2L home O2     Audrey Oneil Jr. Is a 69 y.o. male with history of heart failure status post AICD and pacemaker placement here today with increasing chest tightness and shortness of breath.  He states that he is usually able to walk up to half a mi without difficulty however he is becoming more short of breath.  States that his abdomen is become more swollen.  He states in the past he has had to have fluid removed from his abdomen.  He states there is no acute event today however that is breath is becoming increasingly short that he needed to be evaluated.  He denies any diaphoresis, no fevers, chills, nausea, vomiting. He states he makes very little urine however he will make urine when he takes diuretic medication.        Review of patient's allergies indicates:   Allergen Reactions    Iodine containing multivitamin Swelling     itching    Keflex [cephalexin] Swelling     Eyes.  Tolerated multiple doses of zosyn and 1 dose of augmentin in 2015 and 2016, respectively    Peaches [peach (prunus persica)] Swelling     eyes    Shellfish containing products Swelling    Fig tree Swelling     itching    Tuberculin spenser test ppd Rash     Past Medical History:   Diagnosis Date    AICD (automatic cardioverter/defibrillator) present 12/13/2015      Anticoagulant long-term use     CHF (congestive heart failure)     Chronic anticoagulation 5/5/2016    Chronic combined systolic and diastolic heart failure 11/26/2012    EF 10-20% on ECHO 2013    Clotting disorder     Coronary artery disease involving native coronary artery of native heart without angina pectoris 11/26/2012    Cath 10- Stents patent non-obstructive disease Cath 11-12015 non-obstructive disease     Diverticulosis of colon     DVT (deep venous thrombosis),  unspecified laterality 2015    Essential hypertension 11/15/2015    Hyperlipidemia     Hypertensive heart disease with heart failure 2016    MI (myocardial infarction) 2009    x's 5    Nicotine abuse     Obesity 2012    Pulmonary embolism     Renal disorder     LAVERNE    Stented coronary artery 2012    LAD stent placed 10/17/2007      Past Surgical History:   Procedure Laterality Date    APPENDECTOMY      BACK SURGERY      CARDIAC DEFIBRILLATOR PLACEMENT      CARDIAC SURGERY  2007    stent    CARPAL TUNNEL RELEASE Right 2018    INSERTION OF BIVENTRICULAR IMPLANTABLE CARDIOVERTER-DEFIBRILLATOR (ICD) N/A 5/3/2019    Procedure: INSERTION, ICD, BIVENTRICULAR;  Surgeon: Teofilo Pal MD;  Location: [facility] EP LAB;  Service: Cardiology;  Laterality: N/A;  ICH CM,  ICD UPGD BI-V,  SJM, MAC, FAS, 3PREP (dual ICD INSITU)    r knee scope      REVISION OF SKIN POCKET FOR CARDIOVERTER-DEFIBRILLATOR Left 5/3/2019    Procedure: Revision, Skin Pocket, For Cardioverter-Defibrillator;  Surgeon: Teofilo Pal MD;  Location: [facility] EP LAB;  Service: Cardiology;  Laterality: Left;    SPINE SURGERY      TONSILLECTOMY      TRIGGER FINGER RELEASE Right 2018    thumb     Family History   Problem Relation Age of Onset    Cancer Mother         colon cancer    Heart disease Mother     Diabetes Mother     Heart disease Father     Stroke Father     Colon polyps Father     Cancer Paternal Grandmother         leukemia    No Known Problems Sister     No Known Problems Daughter     No Known Problems Son     No Known Problems Sister     No Known Problems Son      Social History     Tobacco Use    Smoking status: Former Smoker     Packs/day: 1.00     Years: 45.00     Pack years: 45.00     Types: Cigarettes     Last attempt to quit: 2005     Years since quittin.2    Smokeless tobacco: Never Used    Tobacco comment: 1-1.5 ppd every day.   Substance Use Topics     Alcohol use: No     Frequency: Never    Drug use: No     Review of Systems   Constitutional: Positive for activity change and fatigue. Negative for appetite change, diaphoresis and fever.   HENT: Negative.    Eyes: Negative for visual disturbance.   Respiratory: Positive for chest tightness and shortness of breath.    Cardiovascular: Positive for chest pain.   Gastrointestinal: Positive for abdominal distention. Negative for nausea and vomiting.   Genitourinary: Positive for decreased urine volume.        Inability to maintain erection   Musculoskeletal: Negative for arthralgias and back pain.   Skin: Negative for color change and pallor.   Neurological: Positive for weakness (generalized). Negative for syncope.       Physical Exam     Initial Vitals [03/09/20 0112]   BP Pulse Resp Temp SpO2   (!) 144/85 66 18 97.9 °F (36.6 °C) 98 %      MAP       --         Physical Exam    Vitals reviewed.  Constitutional: He appears well-developed and well-nourished.  Non-toxic appearance.   Elderly male sitting in the bed with nasal cannula in place.  Does not appear toxic, appears mildly uncomfortable   HENT:   Head: Normocephalic and atraumatic.   Eyes: EOM are normal. Pupils are equal, round, and reactive to light.   Neck: Normal range of motion. JVD present.   Cardiovascular: An irregularly irregular rhythm present.  Exam reveals distant heart sounds.    Pacemaker palpated to the left upper chest   Pulmonary/Chest: Accessory muscle usage present. No stridor. No respiratory distress. He has rales in the right lower field and the left lower field.   Abdominal: He exhibits distension. He exhibits no ascites and no mass. There is no tenderness. There is no rebound and no guarding.   Musculoskeletal: Normal range of motion.        Right lower leg: He exhibits edema.        Left lower leg: He exhibits edema.   Neurological: He is alert and oriented to person, place, and time.   Skin: Skin is warm and dry.   Ecchymosis to  "bilateral upper extremities   Psychiatric: He has a normal mood and affect.         ED Course   Procedures  Labs Reviewed   CBC W/ AUTO DIFFERENTIAL - Abnormal; Notable for the following components:       Result Value    Mean Corpuscular Hemoglobin Conc 31.6 (*)     All other components within normal limits   COMPREHENSIVE METABOLIC PANEL - Abnormal; Notable for the following components:    Sodium 146 (*)     Potassium 3.4 (*)     Calcium 8.3 (*)     All other components within normal limits   TROPONIN I - Abnormal; Notable for the following components:    Troponin I 0.066 (*)     All other components within normal limits   B-TYPE NATRIURETIC PEPTIDE - Abnormal; Notable for the following components:     (*)     All other components within normal limits          Imaging Results          X-Ray Chest PA And Lateral (Final result)  Result time 03/09/20 03:22:02    Final result by Jefe Reyes MD (03/09/20 03:22:02)                 Impression:      Cardiomegaly and mild perihilar edema.      Electronically signed by: Jefe Reyes MD  Date:    03/09/2020  Time:    03:22             Narrative:    EXAMINATION:  XR CHEST PA AND LATERAL    CLINICAL HISTORY:  Provided history is "Chest Pain;  ".    TECHNIQUE:  Frontal and lateral views of the chest were performed.    COMPARISON:  05/03/2019.    FINDINGS:  Cardiac wires overlie the chest.  Cardiac silhouette is enlarged but stable.  Atherosclerotic calcifications overlie the aortic arch.  Left chest wall AICD is present with transvenous leads overlying the heart.  There is central vascular congestion and coarsened perihilar lung markings suggestive of mild edema.  No confluent area of consolidation.  No sizable pleural effusion.  No pneumothorax.                                 Medical Decision Making:   History:   Old Medical Records: I decided to obtain old medical records.  Initial Assessment:   Audrey Oneil JrFly Is a 69 y.o. male with history of heart " failure status post pacemaker/AICD placement here today with increased shortness of breath consistent with acute heart failure exacerbation.  Differential Diagnosis:   Acute CHF, fluid overload, hyperkalemia, pleural effusion, lower suspicion for acute coronary syndrome at this time.  Clinical Tests:   Lab Tests: Ordered and Reviewed       <> Summary of Lab: Labs overall very consistent with previous measurements with mildly elevated BNP, baseline troponin slightly outside the normal range  Radiological Study: Ordered and Reviewed  Medical Tests: Ordered and Reviewed  ED Management:  Overall the patient appeared to be most consistent with a CHF exacerbation presentation.  Patient given 80 mg of IV furosemide to unload fluid.  Patient had nearly 1 L output of urine which led him to feel significantly better.  Patient never having true chest pain throughout his visit other a chest tightness that he felt was mostly inability get in a full breath.  EKG without evidence of any acute ischemic changes, pacer spikes present in a regular rhythm                   ED Course as of Mar 09 0650   Mon Mar 09, 2020   0448 Patient had nearly a L of urine output following Lasix.  Patient feels much better after having this fluid off.  He feels like he is ready to go at this time.    [NS]      ED Course User Index  [NS] Kade Houston MD                Clinical Impression:       ICD-10-CM ICD-9-CM   1. SOB (shortness of breath) R06.02 786.05   2. Chest pain R07.9 786.50   3. Hypervolemia, unspecified hypervolemia type E87.70 276.69             ED Disposition Condition    Discharge Stable        ED Prescriptions     None        Follow-up Information     Follow up With Specialties Details Why Contact Info    January Khan MD Internal Medicine Schedule an appointment as soon as possible for a visit in 1 week For re-evaluation of your symptoms [address] [facility] [address]  [phone]      your cardiologist  Schedule an  appointment as soon as possible for a visit  For re-evaluation of your symptoms and continued management of your heart failure     [facility] Emergency Medicine Go to  [facility] symptoms worsen [address] [facility] [id number] [phone]                                     Kade Houston MD  Resident  03/09/20 0633

## 2020-03-09 NOTE — ED TRIAGE NOTES
Pt reports SOB, chest tightness and abdominal swelling that has gotten worse over the past couple days. Pt denies N/V/D, fevers at home. Reports compliance with home lasix.     Patient Identifiers for Audrey Oneil Jr. checked and correct  LOC: The patient is awake, alert and aware of environment with an appropriate affect, the patient is oriented x 3 and speaking appropriate.  APPEARANCE: Patient resting comfortably and in no acute distress, patient is clean and well groomed, patient's clothing is properly fastened.  SKIN: The skin is warm and dry, patient has normal skin turgor and moist mucus membranes,no rashes or lesions.Skin Intact , No Breakdown Noted  Musculoskeletal :  Normal range of motion noted. Moves all extremeties well, No swelling or tenderness noted  RESPIRATORY: Airway is open and patent, respirations are spontaneous, patient has a normal effort and rate. On PRN nasal cannula 2 L  CARDIAC: Patient has a normal rate and rhythm, no periphreal edema noted, capillary refill < 3 seconds.   ABDOMEN: Soft, tender to palpation in all quadrants, distention noted  PULSES: 2+  And symmetrical in all extremeties  NEUROLOGIC: PERRL. facial expression is symmetrical, patient moving all extremities, normal sensation in all extremities when touched with a finger.The patient is awake, alert and cooperative with a calm affect, patient is aware of environment.    Will continue to monitor

## 2020-03-18 DIAGNOSIS — I11.0 HYPERTENSIVE HEART DISEASE WITH HEART FAILURE: ICD-10-CM

## 2020-03-18 DIAGNOSIS — T50.2X5A DIURETIC-INDUCED HYPOKALEMIA: ICD-10-CM

## 2020-03-18 DIAGNOSIS — N18.30 HYPERTENSIVE KIDNEY DISEASE WITH STAGE 3 CHRONIC KIDNEY DISEASE: ICD-10-CM

## 2020-03-18 DIAGNOSIS — E78.5 HYPERLIPIDEMIA LDL GOAL <70: ICD-10-CM

## 2020-03-18 DIAGNOSIS — I12.9 HYPERTENSIVE KIDNEY DISEASE WITH STAGE 3 CHRONIC KIDNEY DISEASE: ICD-10-CM

## 2020-03-18 DIAGNOSIS — I50.42 CHRONIC COMBINED SYSTOLIC AND DIASTOLIC CONGESTIVE HEART FAILURE: Primary | ICD-10-CM

## 2020-03-18 DIAGNOSIS — E87.6 DIURETIC-INDUCED HYPOKALEMIA: ICD-10-CM

## 2020-03-18 DIAGNOSIS — I25.5 CARDIOMYOPATHY, ISCHEMIC: Chronic | ICD-10-CM

## 2020-03-18 DIAGNOSIS — I25.10 CORONARY ARTERY DISEASE INVOLVING NATIVE CORONARY ARTERY OF NATIVE HEART WITHOUT ANGINA PECTORIS: ICD-10-CM

## 2020-03-18 RX ORDER — AMIODARONE HYDROCHLORIDE 100 MG/1
TABLET ORAL
Qty: 30 TABLET | Refills: 3 | Status: SHIPPED | OUTPATIENT
Start: 2020-03-18 | End: 2020-04-28

## 2020-03-18 RX ORDER — POTASSIUM CHLORIDE 20 MEQ/1
20 TABLET, EXTENDED RELEASE ORAL DAILY
Qty: 30 TABLET | Refills: 3 | Status: SHIPPED | OUTPATIENT
Start: 2020-03-18 | End: 2020-07-17 | Stop reason: SDUPTHER

## 2020-03-19 NOTE — PROGRESS NOTES
He is overdue for visit but had recent lab with low K  Lab Results   Component Value Date     (H) 03/09/2020     (H) 03/09/2020    K 3.4 (L) 03/09/2020    MG 1.9 08/30/2018     03/09/2020    CO2 25 03/09/2020    BUN 13 03/09/2020    CREATININE 0.9 03/09/2020    GLU 86 03/09/2020    HGBA1C 6.2 (H) 12/04/2019    AST 25 03/09/2020    ALT 25 03/09/2020    ALBUMIN 3.5 03/09/2020    PROT 6.4 03/09/2020    BILITOT 0.3 03/09/2020    WBC 6.44 03/09/2020    HGB 14.4 03/09/2020    HCT 45.5 03/09/2020    HCT 38 01/27/2018     03/09/2020    INR 0.9 04/25/2019    INR 2.3 (H) 12/29/2011    TSH 0.833 05/04/2018    CHOL 137 12/04/2019    HDL 45 12/04/2019    LDLCALC 66.6 12/04/2019    TRIG 127 12/04/2019     Not on KCL will order KCL 20 meq qd  BMP in 2-4 weeks  Needs f/u visit within next 3 months

## 2020-04-07 ENCOUNTER — LAB VISIT (OUTPATIENT)
Dept: LAB | Facility: HOSPITAL | Age: 70
End: 2020-04-07
Attending: INTERNAL MEDICINE
Payer: MEDICARE

## 2020-04-07 ENCOUNTER — TELEPHONE (OUTPATIENT)
Dept: TRANSPLANT | Facility: CLINIC | Age: 70
End: 2020-04-07

## 2020-04-07 DIAGNOSIS — I11.0 HYPERTENSIVE HEART DISEASE WITH HEART FAILURE: ICD-10-CM

## 2020-04-07 DIAGNOSIS — T50.2X5A DIURETIC-INDUCED HYPOKALEMIA: Primary | ICD-10-CM

## 2020-04-07 DIAGNOSIS — N18.30 HYPERTENSIVE KIDNEY DISEASE WITH STAGE 3 CHRONIC KIDNEY DISEASE: ICD-10-CM

## 2020-04-07 DIAGNOSIS — I50.42 CHRONIC COMBINED SYSTOLIC AND DIASTOLIC CONGESTIVE HEART FAILURE: ICD-10-CM

## 2020-04-07 DIAGNOSIS — E87.6 DIURETIC-INDUCED HYPOKALEMIA: ICD-10-CM

## 2020-04-07 DIAGNOSIS — I25.5 CARDIOMYOPATHY, ISCHEMIC: Chronic | ICD-10-CM

## 2020-04-07 DIAGNOSIS — I50.22 CHRONIC SYSTOLIC HEART FAILURE: ICD-10-CM

## 2020-04-07 DIAGNOSIS — I12.9 HYPERTENSIVE KIDNEY DISEASE WITH STAGE 3 CHRONIC KIDNEY DISEASE: ICD-10-CM

## 2020-04-07 DIAGNOSIS — E87.6 DIURETIC-INDUCED HYPOKALEMIA: Primary | ICD-10-CM

## 2020-04-07 DIAGNOSIS — I25.10 CORONARY ARTERY DISEASE INVOLVING NATIVE CORONARY ARTERY OF NATIVE HEART WITHOUT ANGINA PECTORIS: ICD-10-CM

## 2020-04-07 DIAGNOSIS — T50.2X5A DIURETIC-INDUCED HYPOKALEMIA: ICD-10-CM

## 2020-04-07 LAB
ANION GAP SERPL CALC-SCNC: 12 MMOL/L (ref 8–16)
BUN SERPL-MCNC: 23 MG/DL (ref 8–23)
CALCIUM SERPL-MCNC: 8.6 MG/DL (ref 8.7–10.5)
CHLORIDE SERPL-SCNC: 100 MMOL/L (ref 95–110)
CO2 SERPL-SCNC: 30 MMOL/L (ref 23–29)
CREAT SERPL-MCNC: 1.3 MG/DL (ref 0.5–1.4)
EST. GFR  (AFRICAN AMERICAN): >60 ML/MIN/1.73 M^2
EST. GFR  (NON AFRICAN AMERICAN): 56.5 ML/MIN/1.73 M^2
GLUCOSE SERPL-MCNC: 110 MG/DL (ref 70–110)
POTASSIUM SERPL-SCNC: 3.2 MMOL/L (ref 3.5–5.1)
SODIUM SERPL-SCNC: 142 MMOL/L (ref 136–145)

## 2020-04-07 PROCEDURE — 80048 BASIC METABOLIC PNL TOTAL CA: CPT

## 2020-04-07 PROCEDURE — 36415 COLL VENOUS BLD VENIPUNCTURE: CPT

## 2020-04-07 NOTE — PROGRESS NOTES
Lab Results   Component Value Date     04/07/2020     (H) 03/09/2020    K 3.2 (L) 04/07/2020    K 3.4 (L) 03/09/2020     04/07/2020    GLU 86 03/09/2020    BUN 23 04/07/2020    BUN 13 03/09/2020    CREATININE 1.3 04/07/2020    CREATININE 0.9 03/09/2020     He did not start potassium after last lab as reports no one called him as I inadvertently only sent message to schedulers and he didn't  script.  He will get today and take 40 meq and 1-2 hrs later 20 meq tonight, take 40 meq tomorrow and then 20 meq daily.  I reviewed all with him.  Get BMP in 2-3 weeks.

## 2020-04-07 NOTE — TELEPHONE ENCOUNTER
Called pt regarding his low potassium and wasn't able to reach him I left a message on his voice mail    Called pt again I was able to talk to pt brother and I ask him can he have the pt  give the office a call back. Pt brother said he would've pt call the office when he get home

## 2020-04-08 ENCOUNTER — TELEPHONE (OUTPATIENT)
Dept: TRANSPLANT | Facility: CLINIC | Age: 70
End: 2020-04-08

## 2020-04-08 NOTE — TELEPHONE ENCOUNTER
4/7/20  1:55 pm:    F/U on nurse lab phone review  Noted BMP w/ K+:  3.2   From 3.4    Also reviewed more of chart and saw note by Dr. Iniguez regarding a 3.4 Potassium a few weeks ago, ordered K, sent Rx to pharmacy, and this is the repeat lab    Asked Leah LOERA MA to call pt and see how he is taking Potassium and how he is taking Lasix    4:00 pm Asked  MA for update on this pt.    Said she has not reached him yet  4:10 pm:  Leah on phone with pt  Pt reporting he is not taking Potassium and has not taken it in a year   Told her I would call and speak with pt  4:45 pm  Had some trouble reaching pt  Pt also told me he is not taking Potassium and was not aware to start taking it.  Pt said he is unaware of prescription sent to his pharmacy ( that I just noted in his med profile-is correct pharmacy per pt  Pt also told me he takes his Lasix all at once=80 mg every night ( says he doesn't sleep at night-does so during the day)   Advised pt to start potassium and asked if he would be able to get it today and said he would   Instructed pt to take 2 tablets of Potassium tonight ( 40 meq) and repeat this dose tomorrow. Starting Thursday 4/9/20 to take 1 tablet once daily and repeat lab work that day   Pt in agreement with all  Lab appt scheduled and phone review entered.    4/7/20:  5:00 pm: Sent message to Dr. iniguez outlining all of above and confirming he is ok with Potassium dosing and lab appt    4/7/20:  5:35 pm  Dr. Rubio informed me he spoke with pt , verified cost of Rx himself w/ pts pharmacy, told pt what dose to take 4/7, 4/8 and thereafter  And asked 4/9/20 lab appt be cancelled and he will have pt repeat labs in 2-3 weeks and sent this to appt cotrdinator.   4/7/20  5:45 pm : Cancelled lab appt 4/9/20 4/8/20  1:45 pm   Verified lab appt has been scheduled and is for 4/20/20   Nurse review is already in:  I added to it: see NN 4/7 and Carl note 4/7   Watch K+

## 2020-04-10 DIAGNOSIS — M1A.0720 IDIOPATHIC CHRONIC GOUT OF LEFT FOOT WITHOUT TOPHUS: ICD-10-CM

## 2020-04-10 RX ORDER — COLCHICINE 0.6 MG/1
TABLET ORAL
Qty: 20 TABLET | Refills: 0 | Status: SHIPPED | OUTPATIENT
Start: 2020-04-10 | End: 2020-06-02 | Stop reason: SDUPTHER

## 2020-04-20 ENCOUNTER — CLINICAL SUPPORT (OUTPATIENT)
Dept: CARDIOLOGY | Facility: HOSPITAL | Age: 70
End: 2020-04-20
Attending: INTERNAL MEDICINE
Payer: MEDICARE

## 2020-04-20 ENCOUNTER — LAB VISIT (OUTPATIENT)
Dept: LAB | Facility: HOSPITAL | Age: 70
End: 2020-04-20
Attending: INTERNAL MEDICINE
Payer: MEDICARE

## 2020-04-20 DIAGNOSIS — I50.41 ACUTE COMBINED SYSTOLIC AND DIASTOLIC CONGESTIVE HEART FAILURE: ICD-10-CM

## 2020-04-20 DIAGNOSIS — I12.9 HYPERTENSIVE KIDNEY DISEASE WITH STAGE 3 CHRONIC KIDNEY DISEASE: ICD-10-CM

## 2020-04-20 DIAGNOSIS — Z95.810 ICD (IMPLANTABLE CARDIOVERTER-DEFIBRILLATOR) IN PLACE: ICD-10-CM

## 2020-04-20 DIAGNOSIS — N18.30 HYPERTENSIVE KIDNEY DISEASE WITH STAGE 3 CHRONIC KIDNEY DISEASE: ICD-10-CM

## 2020-04-20 DIAGNOSIS — E87.6 DIURETIC-INDUCED HYPOKALEMIA: ICD-10-CM

## 2020-04-20 DIAGNOSIS — I25.5 CARDIOMYOPATHY, ISCHEMIC: Chronic | ICD-10-CM

## 2020-04-20 DIAGNOSIS — I50.42 CHRONIC COMBINED SYSTOLIC AND DIASTOLIC CONGESTIVE HEART FAILURE: Primary | ICD-10-CM

## 2020-04-20 DIAGNOSIS — T50.2X5A DIURETIC-INDUCED HYPOKALEMIA: ICD-10-CM

## 2020-04-20 LAB
ANION GAP SERPL CALC-SCNC: 13 MMOL/L (ref 8–16)
BUN SERPL-MCNC: 23 MG/DL (ref 8–23)
CALCIUM SERPL-MCNC: 9.1 MG/DL (ref 8.7–10.5)
CHLORIDE SERPL-SCNC: 102 MMOL/L (ref 95–110)
CO2 SERPL-SCNC: 30 MMOL/L (ref 23–29)
CREAT SERPL-MCNC: 1.1 MG/DL (ref 0.5–1.4)
EST. GFR  (AFRICAN AMERICAN): >60 ML/MIN/1.73 M^2
EST. GFR  (NON AFRICAN AMERICAN): >60 ML/MIN/1.73 M^2
GLUCOSE SERPL-MCNC: 107 MG/DL (ref 70–110)
POTASSIUM SERPL-SCNC: 3.5 MMOL/L (ref 3.5–5.1)
SODIUM SERPL-SCNC: 145 MMOL/L (ref 136–145)

## 2020-04-20 PROCEDURE — 93295 DEV INTERROG REMOTE 1/2/MLT: CPT | Mod: ,,, | Performed by: INTERNAL MEDICINE

## 2020-04-20 PROCEDURE — 93296 REM INTERROG EVL PM/IDS: CPT | Performed by: INTERNAL MEDICINE

## 2020-04-20 PROCEDURE — 93295 CARDIAC DEVICE CHECK - REMOTE: ICD-10-PCS | Mod: ,,, | Performed by: INTERNAL MEDICINE

## 2020-04-20 PROCEDURE — 80048 BASIC METABOLIC PNL TOTAL CA: CPT

## 2020-04-20 PROCEDURE — 36415 COLL VENOUS BLD VENIPUNCTURE: CPT

## 2020-04-20 RX ORDER — SPIRONOLACTONE 25 MG/1
25 TABLET ORAL DAILY
Qty: 30 TABLET | Refills: 5 | Status: SHIPPED | OUTPATIENT
Start: 2020-04-20 | End: 2020-12-18

## 2020-04-20 NOTE — PROGRESS NOTES
He is on KCL 20 meq qd; he is not on aldactone; will add aldactone 25 mg qd and continue current dose of furosemide and potassium  Obtain BMP in 2 weeks to lessen lab with COVID as plenty of room with current K value and Cr is normal.  I spoke with him today and reviewed all.  Scripts sent for aldactone 25 mg qd and lab order entered.    FYI to HF clinic

## 2020-04-23 RX ORDER — ATORVASTATIN CALCIUM 80 MG/1
TABLET, FILM COATED ORAL
Qty: 90 TABLET | Refills: 0 | Status: SHIPPED | OUTPATIENT
Start: 2020-04-23 | End: 2020-07-23

## 2020-04-28 RX ORDER — AMIODARONE HYDROCHLORIDE 100 MG/1
TABLET ORAL
Qty: 30 TABLET | Refills: 0 | Status: SHIPPED | OUTPATIENT
Start: 2020-04-28 | End: 2020-04-29 | Stop reason: SDUPTHER

## 2020-04-28 NOTE — TELEPHONE ENCOUNTER
This prescription should be under Dr. Teofilo Pal; filled as courtesy today, refills from him.  He is following PFT's and amiodarone for toxicity and he adjusts dose.

## 2020-04-29 ENCOUNTER — NURSE TRIAGE (OUTPATIENT)
Dept: ADMINISTRATIVE | Facility: CLINIC | Age: 70
End: 2020-04-29

## 2020-04-29 DIAGNOSIS — I50.42 CHRONIC COMBINED SYSTOLIC AND DIASTOLIC CONGESTIVE HEART FAILURE: ICD-10-CM

## 2020-04-29 DIAGNOSIS — I25.5 CARDIOMYOPATHY, ISCHEMIC: Primary | Chronic | ICD-10-CM

## 2020-04-29 DIAGNOSIS — Z79.899 ON AMIODARONE THERAPY: Primary | ICD-10-CM

## 2020-04-29 RX ORDER — AMIODARONE HYDROCHLORIDE 100 MG/1
100 TABLET ORAL DAILY
Qty: 1 TABLET | Refills: 0 | Status: SHIPPED | OUTPATIENT
Start: 2020-04-29 | End: 2020-04-29

## 2020-04-29 RX ORDER — AMIODARONE HYDROCHLORIDE 200 MG/1
300 TABLET ORAL DAILY
Qty: 135 TABLET | Refills: 3 | Status: SHIPPED | OUTPATIENT
Start: 2020-04-29 | End: 2020-04-30 | Stop reason: SDUPTHER

## 2020-04-29 RX ORDER — AMIODARONE HYDROCHLORIDE 200 MG/1
TABLET ORAL
Qty: 45 TABLET | OUTPATIENT
Start: 2020-04-29

## 2020-04-29 NOTE — TELEPHONE ENCOUNTER
Received request for PA yesterday for Amiodarone  Looks like Dr. Henry has been prescribing it  Sent to [facility] for signature and route to [facility] to help with P.A.

## 2020-04-29 NOTE — TELEPHONE ENCOUNTER
Home number busy. Spoke with brother-in-law on mobile number and states that the contact number for pt is the home number listed in chart. Will attempt to contact pt again momentarily.    1820 Home number busy. Spoke with brother-in-law again and he states that pt is awaiting our call and is trying to call [facility] back. Instructed pt's brother-in-law to have pt call [phone] since there is a busy tone each time the [facility] RN has attempted to reach him; brother-in law voice verbal understanding and agrees with instructions.    1832 Attempted to call pt twice more with busy tones noted both times.    1842 Phone line remains busy; unable to reach pt.

## 2020-04-29 NOTE — TELEPHONE ENCOUNTER
" [phone]-- Main campus [facility] states  [facility]  I notified pt this was being done. Also that is only 1 pill. Needs to follow up with MD re 90 days supply. Pt verbalizes understanding.       Needs 90 days supply of amiodarone. 100 mg daily.       Reason for Disposition   Caller requesting a NON-URGENT new prescription or refill and triager unable to refill per unit policy    Additional Information   Negative: MORE THAN A DOUBLE DOSE of a prescription or over-the-counter (OTC) drug   Negative: [1] DOUBLE DOSE (an extra dose or lesser amount) of over-the-counter (OTC) drug AND [2] any symptoms (e.g., dizziness, nausea, pain, sleepiness)   Negative: [1] DOUBLE DOSE (an extra dose or lesser amount) of prescription drug AND [2] any symptoms (e.g., dizziness, nausea, pain, sleepiness)   Negative: Took another person's prescription drug   Negative: [1] DOUBLE DOSE (an extra dose or lesser amount) of prescription drug AND [2] NO symptoms (Exception: a double dose of antibiotics)   Negative: Diabetes drug error or overdose (e.g., insulin or extra dose)   Negative: [1] Request for URGENT new prescription or refill of "essential" medication (i.e., likelihood of harm to patient if not taken) AND [2] triager unable to fill per unit policy   Negative: [1] Prescription not at pharmacy AND [2] was prescribed today by PCP   Negative: Pharmacy calling with prescription questions and triager unable to answer question   Negative: Caller has urgent medication question about med that PCP prescribed and triager unable to answer question   Negative: Caller has NON-URGENT medication question about med that PCP prescribed and triager unable to answer question    Protocols used: MEDICATION QUESTION CALL-A-AH      "

## 2020-04-29 NOTE — TELEPHONE ENCOUNTER
I refilled yesterday for Dr. Taylor Armijo as thought that he was off.  Since needs PA and turns out Dr. ENRIQUEZ is working will forward to him and deny this refill.

## 2020-04-30 RX ORDER — AMIODARONE HYDROCHLORIDE 200 MG/1
300 TABLET ORAL DAILY
Qty: 135 TABLET | Refills: 3 | Status: SHIPPED | OUTPATIENT
Start: 2020-04-30 | End: 2021-07-24

## 2020-04-30 NOTE — TELEPHONE ENCOUNTER
"  C/O needs refill on Amniodarone- has been calling for 3 days. He is out of medication to get refill. Needs it to go to [email or web address]. One pill was sent to [email or web address] by someone. The pharmacy wanted 18 dollars for 1 pill. He usually gets refill of 90. States gets this medication from Dr Teofilo Armijo. Will send him note for refill.   Reason for Disposition   Pharmacy calling with prescription question and triager answers question    Additional Information   Negative: Drug overdose and nurse unable to answer question   Negative: Caller requesting information not related to medicine   Negative: Caller requesting a prescription for Strep throat and has a positive culture result   Negative: Rash while taking a medication or within 3 days of stopping it   Negative: Immunization reaction suspected   Negative: [1] Asthma AND [2] having symptoms of asthma (cough, wheezing, etc)   Negative: MORE THAN A DOUBLE DOSE of a prescription or over-the-counter (OTC) drug   Negative: [1] DOUBLE DOSE (an extra dose or lesser amount) of over-the-counter (OTC) drug AND [2] any symptoms (e.g., dizziness, nausea, pain, sleepiness)   Negative: [1] DOUBLE DOSE (an extra dose or lesser amount) of prescription drug AND [2] any symptoms (e.g., dizziness, nausea, pain, sleepiness)   Negative: Took another person's prescription drug   Negative: [1] DOUBLE DOSE (an extra dose or lesser amount) of prescription drug AND [2] NO symptoms (Exception: a double dose of antibiotics)   Negative: Diabetes drug error or overdose (e.g., insulin or extra dose)   Negative: [1] Request for URGENT new prescription or refill of "essential" medication (i.e., likelihood of harm to patient if not taken) AND [2] triager unable to fill per unit policy   Negative: [1] Prescription not at pharmacy AND [2] was prescribed today by PCP   Negative: Pharmacy calling with prescription questions and triager unable to answer question   Negative: Caller has urgent " medication question about med that PCP prescribed and triager unable to answer question   Negative: Caller has NON-URGENT medication question about med that PCP prescribed and triager unable to answer question   Negative: Caller requesting a NON-URGENT new prescription or refill and triager unable to refill per unit policy   Negative: Caller has medication question about med not prescribed by PCP and triager unable to answer question (e.g., compatibility with other med, storage)   Negative: [1] DOUBLE DOSE (an extra dose or lesser amount) of over-the-counter (OTC) drug AND [2] NO symptoms   Negative: [1] DOUBLE DOSE (an extra dose or lesser amount) of antibiotic drug AND [2] NO symptoms    Protocols used: MEDICATION QUESTION CALL-A-AH

## 2020-04-30 NOTE — TELEPHONE ENCOUNTER
MD Teofilo Stringer MD   Outpatient Medication Detail      Disp Refills Start End    amiodarone (PACERONE) 200 MG Tab 135 tablet 3 4/29/2020     Sig - Route: Take 1.5 tablets (300 mg total) by mouth once daily. - Oral    Sent to pharmacy as: amiodarone (PACERONE) 200 MG Tab    E-Prescribing Status: Receipt confirmed by pharmacy (4/29/2020 11:43 PM CDT      Dr. Moore sent new prescription and increased dosage and patient needs lab work done tomorrow

## 2020-04-30 NOTE — TELEPHONE ENCOUNTER
----- Message from Teofilo Pal MD sent at 4/30/2020 12:14 AM CDT -----  Can u please get a trough amio level and clarify how much amio he is taking - Earlier, I  ordered a script for 300 a day

## 2020-05-01 ENCOUNTER — LAB VISIT (OUTPATIENT)
Dept: LAB | Facility: HOSPITAL | Age: 70
End: 2020-05-01
Attending: INTERNAL MEDICINE
Payer: MEDICARE

## 2020-05-01 DIAGNOSIS — Z79.899 ON AMIODARONE THERAPY: ICD-10-CM

## 2020-05-01 DIAGNOSIS — I50.42 CHRONIC COMBINED SYSTOLIC AND DIASTOLIC CONGESTIVE HEART FAILURE: ICD-10-CM

## 2020-05-01 PROCEDURE — 80299 QUANTITATIVE ASSAY DRUG: CPT

## 2020-05-01 PROCEDURE — 36415 COLL VENOUS BLD VENIPUNCTURE: CPT

## 2020-05-01 RX ORDER — CLOPIDOGREL BISULFATE 75 MG/1
TABLET ORAL
Qty: 90 TABLET | Refills: 0 | Status: SHIPPED | OUTPATIENT
Start: 2020-05-01 | End: 2020-07-29

## 2020-05-04 LAB
AMIODARONE SERPL-SCNC: 0.5 UG/ML (ref 1–3)
N-DESETHYL-AMIODARONE: 0.4 UG/ML

## 2020-05-08 ENCOUNTER — TELEPHONE (OUTPATIENT)
Dept: CARDIOLOGY | Facility: CLINIC | Age: 70
End: 2020-05-08

## 2020-05-08 NOTE — TELEPHONE ENCOUNTER
Teofilo Pal MD routed this conversation to January Khan MD          4/30/20 9:27 AM   January Khan MD routed this conversation to Teofilo Pal MD       Amiodarone increased to 300 mg daily and prescription sent       ----- Message -----  From: Teofilo Pal MD  Sent: 5/4/2020   5:34 PM CDT  ---To: Lubna Wilson LPN    See comments below and call patient to discuss.   Please close encounter when done -- no need to route back to me.  Thanks    Amio a bit low for VT Rx  Increase to 300 a day

## 2020-05-15 RX ORDER — METOPROLOL SUCCINATE 50 MG/1
TABLET, EXTENDED RELEASE ORAL
Qty: 90 TABLET | Refills: 0 | Status: SHIPPED | OUTPATIENT
Start: 2020-05-15 | End: 2020-07-17 | Stop reason: SDUPTHER

## 2020-05-15 RX ORDER — METOPROLOL SUCCINATE 50 MG/1
50 TABLET, EXTENDED RELEASE ORAL NIGHTLY
Qty: 30 TABLET | Refills: 1 | Status: SHIPPED | OUTPATIENT
Start: 2020-05-15 | End: 2020-05-15

## 2020-05-22 ENCOUNTER — OFFICE VISIT (OUTPATIENT)
Dept: TRANSPLANT | Facility: CLINIC | Age: 70
End: 2020-05-22
Attending: INTERNAL MEDICINE
Payer: MEDICARE

## 2020-05-22 ENCOUNTER — LAB VISIT (OUTPATIENT)
Dept: LAB | Facility: HOSPITAL | Age: 70
End: 2020-05-22
Attending: INTERNAL MEDICINE
Payer: MEDICARE

## 2020-05-22 VITALS
HEIGHT: 67 IN | WEIGHT: 241.63 LBS | HEART RATE: 75 BPM | BODY MASS INDEX: 37.92 KG/M2 | SYSTOLIC BLOOD PRESSURE: 108 MMHG | DIASTOLIC BLOOD PRESSURE: 66 MMHG

## 2020-05-22 DIAGNOSIS — I25.5 CARDIOMYOPATHY, ISCHEMIC: Chronic | ICD-10-CM

## 2020-05-22 DIAGNOSIS — N18.30 HYPERTENSIVE KIDNEY DISEASE WITH STAGE 3 CHRONIC KIDNEY DISEASE: ICD-10-CM

## 2020-05-22 DIAGNOSIS — N28.9 ACUTE RENAL INSUFFICIENCY: ICD-10-CM

## 2020-05-22 DIAGNOSIS — I44.7 LBBB (LEFT BUNDLE BRANCH BLOCK): ICD-10-CM

## 2020-05-22 DIAGNOSIS — E66.01 SEVERE OBESITY (BMI 35.0-39.9) WITH COMORBIDITY: Chronic | ICD-10-CM

## 2020-05-22 DIAGNOSIS — I12.9 HYPERTENSIVE KIDNEY DISEASE WITH STAGE 3 CHRONIC KIDNEY DISEASE: ICD-10-CM

## 2020-05-22 DIAGNOSIS — I11.0 HYPERTENSIVE HEART DISEASE WITH HEART FAILURE: ICD-10-CM

## 2020-05-22 DIAGNOSIS — E78.5 HYPERLIPIDEMIA LDL GOAL <70: ICD-10-CM

## 2020-05-22 DIAGNOSIS — I25.10 CORONARY ARTERY DISEASE INVOLVING NATIVE CORONARY ARTERY OF NATIVE HEART WITHOUT ANGINA PECTORIS: ICD-10-CM

## 2020-05-22 DIAGNOSIS — I50.42 CHRONIC COMBINED SYSTOLIC AND DIASTOLIC CONGESTIVE HEART FAILURE: Primary | ICD-10-CM

## 2020-05-22 DIAGNOSIS — I50.42 CHRONIC COMBINED SYSTOLIC AND DIASTOLIC CONGESTIVE HEART FAILURE: ICD-10-CM

## 2020-05-22 DIAGNOSIS — Z95.810 PRESENCE OF CARDIAC RESYNCHRONIZATION THERAPY DEFIBRILLATOR (CRT-D): ICD-10-CM

## 2020-05-22 DIAGNOSIS — I50.22 CHRONIC SYSTOLIC HEART FAILURE: ICD-10-CM

## 2020-05-22 LAB
ANION GAP SERPL CALC-SCNC: 12 MMOL/L (ref 8–16)
ANION GAP SERPL CALC-SCNC: 12 MMOL/L (ref 8–16)
BUN SERPL-MCNC: 30 MG/DL (ref 8–23)
BUN SERPL-MCNC: 30 MG/DL (ref 8–23)
CALCIUM SERPL-MCNC: 9.3 MG/DL (ref 8.7–10.5)
CALCIUM SERPL-MCNC: 9.3 MG/DL (ref 8.7–10.5)
CHLORIDE SERPL-SCNC: 101 MMOL/L (ref 95–110)
CHLORIDE SERPL-SCNC: 101 MMOL/L (ref 95–110)
CO2 SERPL-SCNC: 27 MMOL/L (ref 23–29)
CO2 SERPL-SCNC: 27 MMOL/L (ref 23–29)
CREAT SERPL-MCNC: 1.6 MG/DL (ref 0.5–1.4)
CREAT SERPL-MCNC: 1.6 MG/DL (ref 0.5–1.4)
EST. GFR  (AFRICAN AMERICAN): 50.8 ML/MIN/1.73 M^2
EST. GFR  (AFRICAN AMERICAN): 50.8 ML/MIN/1.73 M^2
EST. GFR  (NON AFRICAN AMERICAN): 43.9 ML/MIN/1.73 M^2
EST. GFR  (NON AFRICAN AMERICAN): 43.9 ML/MIN/1.73 M^2
GLUCOSE SERPL-MCNC: 151 MG/DL (ref 70–110)
GLUCOSE SERPL-MCNC: 151 MG/DL (ref 70–110)
POTASSIUM SERPL-SCNC: 4.3 MMOL/L (ref 3.5–5.1)
POTASSIUM SERPL-SCNC: 4.3 MMOL/L (ref 3.5–5.1)
SODIUM SERPL-SCNC: 140 MMOL/L (ref 136–145)
SODIUM SERPL-SCNC: 140 MMOL/L (ref 136–145)

## 2020-05-22 PROCEDURE — 99499 RISK ADDL DX/OHS AUDIT: ICD-10-PCS | Mod: S$GLB,,, | Performed by: INTERNAL MEDICINE

## 2020-05-22 PROCEDURE — 3078F PR MOST RECENT DIASTOLIC BLOOD PRESSURE < 80 MM HG: ICD-10-PCS | Mod: CPTII,S$GLB,, | Performed by: INTERNAL MEDICINE

## 2020-05-22 PROCEDURE — 99214 PR OFFICE/OUTPT VISIT, EST, LEVL IV, 30-39 MIN: ICD-10-PCS | Mod: S$GLB,,, | Performed by: INTERNAL MEDICINE

## 2020-05-22 PROCEDURE — 3078F DIAST BP <80 MM HG: CPT | Mod: CPTII,S$GLB,, | Performed by: INTERNAL MEDICINE

## 2020-05-22 PROCEDURE — 3074F PR MOST RECENT SYSTOLIC BLOOD PRESSURE < 130 MM HG: ICD-10-PCS | Mod: CPTII,S$GLB,, | Performed by: INTERNAL MEDICINE

## 2020-05-22 PROCEDURE — 99999 PR PBB SHADOW E&M-EST. PATIENT-LVL III: CPT | Mod: PBBFAC,,, | Performed by: INTERNAL MEDICINE

## 2020-05-22 PROCEDURE — 99214 OFFICE O/P EST MOD 30 MIN: CPT | Mod: S$GLB,,, | Performed by: INTERNAL MEDICINE

## 2020-05-22 PROCEDURE — 1159F PR MEDICATION LIST DOCUMENTED IN MEDICAL RECORD: ICD-10-PCS | Mod: S$GLB,,, | Performed by: INTERNAL MEDICINE

## 2020-05-22 PROCEDURE — 36415 COLL VENOUS BLD VENIPUNCTURE: CPT

## 2020-05-22 PROCEDURE — 99999 PR PBB SHADOW E&M-EST. PATIENT-LVL III: ICD-10-PCS | Mod: PBBFAC,,, | Performed by: INTERNAL MEDICINE

## 2020-05-22 PROCEDURE — 1126F PR PAIN SEVERITY QUANTIFIED, NO PAIN PRESENT: ICD-10-PCS | Mod: S$GLB,,, | Performed by: INTERNAL MEDICINE

## 2020-05-22 PROCEDURE — 1101F PR PT FALLS ASSESS DOC 0-1 FALLS W/OUT INJ PAST YR: ICD-10-PCS | Mod: CPTII,S$GLB,, | Performed by: INTERNAL MEDICINE

## 2020-05-22 PROCEDURE — 80048 BASIC METABOLIC PNL TOTAL CA: CPT

## 2020-05-22 PROCEDURE — 1159F MED LIST DOCD IN RCRD: CPT | Mod: S$GLB,,, | Performed by: INTERNAL MEDICINE

## 2020-05-22 PROCEDURE — 3074F SYST BP LT 130 MM HG: CPT | Mod: CPTII,S$GLB,, | Performed by: INTERNAL MEDICINE

## 2020-05-22 PROCEDURE — 1126F AMNT PAIN NOTED NONE PRSNT: CPT | Mod: S$GLB,,, | Performed by: INTERNAL MEDICINE

## 2020-05-22 PROCEDURE — 1101F PT FALLS ASSESS-DOCD LE1/YR: CPT | Mod: CPTII,S$GLB,, | Performed by: INTERNAL MEDICINE

## 2020-05-22 PROCEDURE — 99499 UNLISTED E&M SERVICE: CPT | Mod: S$GLB,,, | Performed by: INTERNAL MEDICINE

## 2020-05-22 NOTE — LETTER
May 25, 2020        Elvin Simms Jr.  [address]  [place] [address]  Phone: [phone]  Fax: [phone]             [facility]  [address]  [facility] [address]  Phone: [phone]   Patient: Audrey Oneil Jr.   MR Number: [id number]   YOB: 1950   Date of Visit: 5/22/2020       Dear Dr. Elvin Simms Jr.    Thank you for referring Audrey Oneil to me for evaluation. Attached you will find relevant portions of my assessment and plan of care.    If you have questions, please do not hesitate to call me. I look forward to following Audrey Oneil along with you.    Sincerely,    Migue Henry Jr, MD    Enclosure    If you would like to receive this communication electronically, please contact [email or web address] or [phone] to request HelloFax Link access.    HelloFax Link is a tool which provides read-only access to select patient information with whom you have a relationship. Its easy to use and provides real time access to review your patients record including encounter summaries, notes, results, and demographic information.    If you feel you have received this communication in error or would no longer like to receive these types of communications, please e-mail [email or web address]

## 2020-05-22 NOTE — PATIENT INSTRUCTIONS
Take furosemide and potassium only on Mon-Wed-Fri and daily if needed--I am not changing dose in computer yet    Lab and ECHO in about 2 weeks

## 2020-05-25 NOTE — PROGRESS NOTES
"Subjective:     Patient ID:  Audrey Oneil Jr. is a 69 y.o. male who presents for follow-up of Congestive Heart Failure    HPI:  68 yo WM with CAD s/p STEMI (s/p PCI LAD 2007), CHFI, ischemic cardiomyopathy, PE (2010, s/p 1-yr coumadin), HTN, DLP and s/p ICD [facility] who comes for a regular follow-up with last visit April 2019.  He underwent upgrade to CRT-D device.  He never did start ACE, ARB or ARNI.  He continues to gain weight.  Reduced sex life due to SOB and reduced stamina.  Intolerant to CPAP.  No angina.  Some HENSLEY but inactive, NYHA Class 2.      Review of Systems   Constitution: Positive for malaise/fatigue and weight gain (over 15# past year, gradually). Negative for chills, fever, night sweats and weight loss.   Cardiovascular: Positive for dyspnea on exertion and leg swelling. Negative for chest pain, irregular heartbeat, near-syncope, orthopnea, palpitations, paroxysmal nocturnal dyspnea and syncope.   Respiratory: Positive for sleep disturbances due to breathing (intolerant to CPAP) and snoring. Negative for cough, sputum production and wheezing.    Hematologic/Lymphatic: Does not bruise/bleed easily.   Musculoskeletal: Negative for arthritis, joint pain and stiffness.   Gastrointestinal: Positive for bloating. Negative for hematochezia and melena.   Genitourinary: Negative for hematuria.   Neurological: Negative for brief paralysis, focal weakness, seizures and weakness.     Objective:   Physical Exam   Constitutional: No distress.   /66 (BP Location: Left arm, Patient Position: Sitting, BP Method: Large (Automatic))   Pulse 75   Ht 5' 7" (1.702 m)   Wt 109.6 kg (241 lb 10 oz)   BMI 37.84 kg/m²   Last visit wt 102 kg (224 lb 13.9 oz)  Obese WM in NAD   HENT:   Head: Normocephalic and atraumatic.   Eyes: Conjunctivae are normal. Right eye exhibits no discharge. Left eye exhibits no discharge. No scleral icterus.   Neck: No JVD present. No thyromegaly present.   Cardiovascular: Normal rate, " regular rhythm and normal heart sounds. Exam reveals no gallop.   No murmur heard.  Pulmonary/Chest: Effort normal and breath sounds normal.   Abdominal: Soft. Bowel sounds are normal. He exhibits distension (obese). He exhibits no mass. There is no tenderness. There is no rebound and no guarding.   Musculoskeletal: He exhibits no edema (none today; stasis changes both legs) or tenderness.   Neurological: He is alert.   Skin: Skin is warm and dry. He is not diaphoretic.   Psychiatric: He has a normal mood and affect. His behavior is normal. Judgment and thought content normal.      Lab Results   Component Value Date     (H) 03/09/2020     05/22/2020     05/22/2020    K 4.3 05/22/2020    K 4.3 05/22/2020    MG 1.9 08/30/2018     05/22/2020     05/22/2020    CO2 27 05/22/2020    CO2 27 05/22/2020    BUN 30 (H) 05/22/2020    BUN 30 (H) 05/22/2020    CREATININE 1.6 (H) 05/22/2020    CREATININE 1.6 (H) 05/22/2020     (H) 05/22/2020     (H) 05/22/2020    HGBA1C 6.2 (H) 12/04/2019    AST 25 03/09/2020    ALT 25 03/09/2020    ALBUMIN 3.5 03/09/2020    PROT 6.4 03/09/2020    BILITOT 0.3 03/09/2020    WBC 6.44 03/09/2020    HGB 14.4 03/09/2020    HCT 45.5 03/09/2020     03/09/2020    CHOL 137 12/04/2019    HDL 45 12/04/2019    LDLCALC 66.6 12/04/2019    TRIG 127 12/04/2019     Assessment:     1. Chronic combined systolic and diastolic congestive heart failure    2. Acute renal insufficiency    3. Hypertensive kidney disease with stage 3 chronic kidney disease    4. Coronary artery disease involving native coronary artery of native heart without angina pectoris    5. Cardiomyopathy, ischemic    6. Hypertensive heart disease with heart failure    7. Severe obesity (BMI 35.0-39.9) with comorbidity    8. Hyperlipidemia LDL goal <70    9. LBBB (left bundle branch block)    10. Presence of cardiac resynchronization therapy defibrillator (CRT-D)      Plan:   Add BNP to today's lab  if able  Reduce lasix to M-W-F only and KCL M-W-F with lasix (I am not changing in computer should he require daily)  BMP and BNP along with ECHO in 2 weeks post CRT upgrade and decide what to add to BB and I want to up-titrate BB though would like to unload as well  I discussed with him that a sleep study might help but since intolerant to JESSICA did not pursue  Weight loss and diet is critical; discussed danger of obesity, diet, walking program/exercise, etc  Check lipids next due Dec 2020  RTC 3 months

## 2020-06-02 ENCOUNTER — OFFICE VISIT (OUTPATIENT)
Dept: INTERNAL MEDICINE | Facility: CLINIC | Age: 70
End: 2020-06-02
Payer: MEDICARE

## 2020-06-02 ENCOUNTER — LAB VISIT (OUTPATIENT)
Dept: LAB | Facility: HOSPITAL | Age: 70
End: 2020-06-02
Payer: MEDICARE

## 2020-06-02 VITALS
OXYGEN SATURATION: 97 % | DIASTOLIC BLOOD PRESSURE: 60 MMHG | WEIGHT: 250 LBS | BODY MASS INDEX: 39.24 KG/M2 | SYSTOLIC BLOOD PRESSURE: 114 MMHG | HEIGHT: 67 IN | HEART RATE: 62 BPM | TEMPERATURE: 98 F

## 2020-06-02 DIAGNOSIS — R22.31 LOCALIZED SWELLING OF RIGHT THUMB: ICD-10-CM

## 2020-06-02 DIAGNOSIS — M1A.0720 IDIOPATHIC CHRONIC GOUT OF LEFT FOOT WITHOUT TOPHUS: ICD-10-CM

## 2020-06-02 DIAGNOSIS — M1A.9XX0 CHRONIC GOUT WITHOUT TOPHUS, UNSPECIFIED CAUSE, UNSPECIFIED SITE: ICD-10-CM

## 2020-06-02 DIAGNOSIS — M25.421 SWELLING OF RIGHT ELBOW: ICD-10-CM

## 2020-06-02 DIAGNOSIS — R22.31 LOCALIZED SWELLING OF RIGHT THUMB: Primary | ICD-10-CM

## 2020-06-02 LAB
BASOPHILS # BLD AUTO: 0.06 K/UL (ref 0–0.2)
BASOPHILS NFR BLD: 0.7 % (ref 0–1.9)
DIFFERENTIAL METHOD: ABNORMAL
EOSINOPHIL # BLD AUTO: 0.2 K/UL (ref 0–0.5)
EOSINOPHIL NFR BLD: 1.9 % (ref 0–8)
ERYTHROCYTE [DISTWIDTH] IN BLOOD BY AUTOMATED COUNT: 14.2 % (ref 11.5–14.5)
HCT VFR BLD AUTO: 45.6 % (ref 40–54)
HGB BLD-MCNC: 14.3 G/DL (ref 14–18)
IMM GRANULOCYTES # BLD AUTO: 0.04 K/UL (ref 0–0.04)
IMM GRANULOCYTES NFR BLD AUTO: 0.4 % (ref 0–0.5)
LYMPHOCYTES # BLD AUTO: 2.8 K/UL (ref 1–4.8)
LYMPHOCYTES NFR BLD: 30.4 % (ref 18–48)
MCH RBC QN AUTO: 31.3 PG (ref 27–31)
MCHC RBC AUTO-ENTMCNC: 31.4 G/DL (ref 32–36)
MCV RBC AUTO: 100 FL (ref 82–98)
MONOCYTES # BLD AUTO: 0.6 K/UL (ref 0.3–1)
MONOCYTES NFR BLD: 6.8 % (ref 4–15)
NEUTROPHILS # BLD AUTO: 5.5 K/UL (ref 1.8–7.7)
NEUTROPHILS NFR BLD: 59.8 % (ref 38–73)
NRBC BLD-RTO: 0 /100 WBC
PLATELET # BLD AUTO: 226 K/UL (ref 150–350)
PMV BLD AUTO: 10.4 FL (ref 9.2–12.9)
RBC # BLD AUTO: 4.57 M/UL (ref 4.6–6.2)
URATE SERPL-MCNC: 7.9 MG/DL (ref 3.4–7)
WBC # BLD AUTO: 9.17 K/UL (ref 3.9–12.7)

## 2020-06-02 PROCEDURE — 99999 PR PBB SHADOW E&M-EST. PATIENT-LVL IV: CPT | Mod: PBBFAC,GC,, | Performed by: STUDENT IN AN ORGANIZED HEALTH CARE EDUCATION/TRAINING PROGRAM

## 2020-06-02 PROCEDURE — 99214 OFFICE O/P EST MOD 30 MIN: CPT | Mod: GC,S$GLB,, | Performed by: STUDENT IN AN ORGANIZED HEALTH CARE EDUCATION/TRAINING PROGRAM

## 2020-06-02 PROCEDURE — 36415 COLL VENOUS BLD VENIPUNCTURE: CPT

## 2020-06-02 PROCEDURE — 3074F PR MOST RECENT SYSTOLIC BLOOD PRESSURE < 130 MM HG: ICD-10-PCS | Mod: CPTII,GC,S$GLB, | Performed by: STUDENT IN AN ORGANIZED HEALTH CARE EDUCATION/TRAINING PROGRAM

## 2020-06-02 PROCEDURE — 1101F PR PT FALLS ASSESS DOC 0-1 FALLS W/OUT INJ PAST YR: ICD-10-PCS | Mod: CPTII,GC,S$GLB, | Performed by: STUDENT IN AN ORGANIZED HEALTH CARE EDUCATION/TRAINING PROGRAM

## 2020-06-02 PROCEDURE — 3078F DIAST BP <80 MM HG: CPT | Mod: CPTII,GC,S$GLB, | Performed by: STUDENT IN AN ORGANIZED HEALTH CARE EDUCATION/TRAINING PROGRAM

## 2020-06-02 PROCEDURE — 1159F MED LIST DOCD IN RCRD: CPT | Mod: GC,S$GLB,, | Performed by: STUDENT IN AN ORGANIZED HEALTH CARE EDUCATION/TRAINING PROGRAM

## 2020-06-02 PROCEDURE — 3074F SYST BP LT 130 MM HG: CPT | Mod: CPTII,GC,S$GLB, | Performed by: STUDENT IN AN ORGANIZED HEALTH CARE EDUCATION/TRAINING PROGRAM

## 2020-06-02 PROCEDURE — 1101F PT FALLS ASSESS-DOCD LE1/YR: CPT | Mod: CPTII,GC,S$GLB, | Performed by: STUDENT IN AN ORGANIZED HEALTH CARE EDUCATION/TRAINING PROGRAM

## 2020-06-02 PROCEDURE — 84550 ASSAY OF BLOOD/URIC ACID: CPT

## 2020-06-02 PROCEDURE — 1125F AMNT PAIN NOTED PAIN PRSNT: CPT | Mod: GC,S$GLB,, | Performed by: STUDENT IN AN ORGANIZED HEALTH CARE EDUCATION/TRAINING PROGRAM

## 2020-06-02 PROCEDURE — 1159F PR MEDICATION LIST DOCUMENTED IN MEDICAL RECORD: ICD-10-PCS | Mod: GC,S$GLB,, | Performed by: STUDENT IN AN ORGANIZED HEALTH CARE EDUCATION/TRAINING PROGRAM

## 2020-06-02 PROCEDURE — 99999 PR PBB SHADOW E&M-EST. PATIENT-LVL IV: ICD-10-PCS | Mod: PBBFAC,GC,, | Performed by: STUDENT IN AN ORGANIZED HEALTH CARE EDUCATION/TRAINING PROGRAM

## 2020-06-02 PROCEDURE — 3078F PR MOST RECENT DIASTOLIC BLOOD PRESSURE < 80 MM HG: ICD-10-PCS | Mod: CPTII,GC,S$GLB, | Performed by: STUDENT IN AN ORGANIZED HEALTH CARE EDUCATION/TRAINING PROGRAM

## 2020-06-02 PROCEDURE — 85025 COMPLETE CBC W/AUTO DIFF WBC: CPT

## 2020-06-02 PROCEDURE — 1125F PR PAIN SEVERITY QUANTIFIED, PAIN PRESENT: ICD-10-PCS | Mod: GC,S$GLB,, | Performed by: STUDENT IN AN ORGANIZED HEALTH CARE EDUCATION/TRAINING PROGRAM

## 2020-06-02 PROCEDURE — 99214 PR OFFICE/OUTPT VISIT, EST, LEVL IV, 30-39 MIN: ICD-10-PCS | Mod: GC,S$GLB,, | Performed by: STUDENT IN AN ORGANIZED HEALTH CARE EDUCATION/TRAINING PROGRAM

## 2020-06-02 RX ORDER — COLCHICINE 0.6 MG/1
0.6 TABLET ORAL DAILY
Qty: 20 TABLET | Refills: 0 | Status: SHIPPED | OUTPATIENT
Start: 2020-06-02 | End: 2020-09-18 | Stop reason: SDUPTHER

## 2020-06-02 NOTE — PROGRESS NOTES
Audrey Oneil Jr.  1950        Subjective     Chief Complaint:    History of Present Illness:  Mr. Audrey Oneil Jr. is a 70 y.o. male with CAD c HF (hx STEMI s/p PCI LAD 2007, most recent echo c sys and diastolic dysfunction c EF 10-15%, s/p AICD, hx VT followed by Dr. Marshall/Cards),Chronic gout without tophus, HTN, CKD3, obesity c BMI 37, HLD, hx DVT and PE in 2007 (s/p Coumadinn x 1 year per Cards note), GERD, chronic low back pain, anemia who came in to the clinic complaining of Rt thumb pain for 2 ays.    PAteitn reports waking up 2 days ago with rt thumb pain and swelling limiting movement of his thumb. Reports also increased warmth of his finger compared to Lt side. He stop his Allopurinol yesterday abd tried Benadryl thinking it may have been allergy.  He took Colchicine today with no improvement yet. He denies any fever, chills, N/V or recent trauma. He also admits of having Rt elbow swelling that started 2 weeks ago. None Painol and started gradually. None itchy and no redness or warmth associated with it. No limitation of elbow joint or pain.  Not aware of any skin bite. States his last gout attack was in 11/2019 of his Rt shoulder. He used to have his attacks in LEs.     Patient has chronic gout and had his last gout flar affecting his shoulder in 11/2019. He was started on Colchicine and his Allopurinol dose increased to 100mg BID however, patient reports taking 100 daily for the last month.. His Uric acid was 8.7 with EST 39 and CRP 10.9 in 11/2019. Patient was started on BB Aldactone recently and his Lasix was reduced lasix to M-W-F by his Heart Transplant team, Dr. Henry. Patient is also taking Lipitor 80mg.     Review of Systems   Constitutional: Negative for chills, fever, malaise/fatigue and weight loss.   HENT: Negative for congestion and sore throat.    Eyes: Negative for blurred vision.   Respiratory: Negative for cough, sputum production and shortness of breath.    Cardiovascular:  Negative for chest pain, orthopnea and leg swelling.   Gastrointestinal: Negative for abdominal pain, diarrhea, nausea and vomiting.   Genitourinary: Negative for dysuria, frequency and hematuria.   Musculoskeletal: Positive for joint pain. Negative for myalgias and neck pain.        Rt elbow swelling   Skin: Negative for itching and rash.   Neurological: Negative for dizziness, tingling, focal weakness, weakness and headaches.   Psychiatric/Behavioral: Negative for depression. The patient is not nervous/anxious.         PAST HISTORY:     Past Medical History:   Diagnosis Date    AICD (automatic cardioverter/defibrillator) present 12/13/2015      Anticoagulant long-term use     CHF (congestive heart failure)     Chronic anticoagulation 5/5/2016    Chronic combined systolic and diastolic heart failure 11/26/2012    EF 10-20% on ECHO 2013    Clotting disorder     Coronary artery disease involving native coronary artery of native heart without angina pectoris 11/26/2012    Cath 10- Stents patent non-obstructive disease Cath 11-12015 non-obstructive disease     Diverticulosis of colon     DVT (deep venous thrombosis), unspecified laterality 11/12/2015    Essential hypertension 11/15/2015    Hyperlipidemia     Hypertensive heart disease with heart failure 5/5/2016    MI (myocardial infarction) 2009    x's 5    Nicotine abuse     Obesity 11/26/2012    Pulmonary embolism 2011    Renal disorder     LAVERNE    Stented coronary artery 11/26/2012    LAD stent placed 10/17/2007        Past Surgical History:   Procedure Laterality Date    APPENDECTOMY      BACK SURGERY      CARDIAC DEFIBRILLATOR PLACEMENT      CARDIAC SURGERY  2007    stent    CARPAL TUNNEL RELEASE Right 04/2018    INSERTION OF BIVENTRICULAR IMPLANTABLE CARDIOVERTER-DEFIBRILLATOR (ICD) N/A 5/3/2019    Procedure: INSERTION, ICD, BIVENTRICULAR;  Surgeon: Teofilo Pal MD;  [facility]: [facility];  Service: Cardiology;   Laterality: N/A;  ICH CM,  ICD UPGD BI-V,  SJM, MAC, FAS, 3PREP (dual ICD INSITU)    r knee scope      REVISION OF SKIN POCKET FOR CARDIOVERTER-DEFIBRILLATOR Left 5/3/2019    Procedure: Revision, Skin Pocket, For Cardioverter-Defibrillator;  Surgeon: Teofilo Pal MD;  Location: [facility];  Service: Cardiology;  Laterality: Left;    SPINE SURGERY      TONSILLECTOMY      TRIGGER FINGER RELEASE Right 2018    thumb       Family History   Problem Relation Age of Onset    Cancer Mother         colon cancer    Heart disease Mother     Diabetes Mother     Heart disease Father     Stroke Father     Colon polyps Father     Cancer Paternal Grandmother         leukemia    No Known Problems Sister     No Known Problems Daughter     No Known Problems Son     No Known Problems Sister     No Known Problems Son        Social History     Socioeconomic History    Marital status:      Spouse name: Not on file    Number of children: 2    Years of education: Not on file    Highest education level: Not on file   Occupational History    Occupation: [employer]     Comment: unemployed   Social Needs    Financial resource strain: Not on file    Food insecurity:     Worry: Not on file     Inability: Not on file    Transportation needs:     Medical: Not on file     Non-medical: Not on file   Tobacco Use    Smoking status: Former Smoker     Packs/day: 1.00     Years: 45.00     Pack years: 45.00     Types: Cigarettes     Last attempt to quit: 2005     Years since quittin.4    Smokeless tobacco: Never Used    Tobacco comment: 1-1.5 ppd every day.   Substance and Sexual Activity    Alcohol use: No     Frequency: Never    Drug use: No    Sexual activity: Never   Lifestyle    Physical activity:     Days per week: Not on file     Minutes per session: Not on file    Stress: Not on file   Relationships    Social connections:     Talks on phone: Not on file     Gets together: Not on file      Attends Denominational service: Not on file     Active member of club or organization: Not on file     Attends meetings of clubs or organizations: Not on file     Relationship status: Not on file   Other Topics Concern    Not on file   Social History Narrative    Not on file       MEDICATIONS & ALLERGIES:     Current Outpatient Medications on File Prior to Visit   Medication Sig    allopurinol (ZYLOPRIM) 100 MG tablet Take 1 tablet (100 mg total) by mouth 2 (two) times daily.    amiodarone (PACERONE) 200 MG Tab Take 1.5 tablets (300 mg total) by mouth once daily.    aspirin (ECOTRIN) 325 MG EC tablet Take 325 mg by mouth every evening.     atorvastatin (LIPITOR) 80 MG tablet TAKE 1 TABLET(80 MG) BY MOUTH EVERY DAY    clopidogreL (PLAVIX) 75 mg tablet TAKE 1 TABLET(75 MG) BY MOUTH EVERY DAY    colchicine (COLCRYS) 0.6 mg tablet TAKE 2 TABLETS BY MOUTH FOR 1 DAY, THEN TAKE 1 TABLET BY MOUTH EVERY DAY THEREAFTER AS NEEDED    docusate sodium (COLACE) 50 MG capsule Take 1 capsule (50 mg total) by mouth 2 (two) times daily.    furosemide (LASIX) 40 MG tablet Take 1 tablet (40 mg total) by mouth 2 (two) times daily. (Patient taking differently: Take 80 mg by mouth every evening. )    HYDROcodone-acetaminophen (NORCO)  mg per tablet Take 1 tablet by mouth every 6 (six) hours as needed for Pain.    metoprolol succinate (TOPROL-XL) 50 MG 24 hr tablet TAKE 1 TABLET(50 MG) BY MOUTH EVERY EVENING    potassium chloride SA (K-DUR,KLOR-CON) 20 MEQ tablet Take 1 tablet (20 mEq total) by mouth once daily.    spironolactone (ALDACTONE) 25 MG tablet Take 1 tablet (25 mg total) by mouth once daily.     Current Facility-Administered Medications on File Prior to Visit   Medication    0.9%  NaCl infusion    vancomycin in dextrose 5 % 1 gram/250 mL IVPB 1,000 mg       Review of patient's allergies indicates:   Allergen Reactions    Iodine containing multivitamin Swelling     itching    Keflex [cephalexin] Swelling      Eyes.  Tolerated multiple doses of zosyn and 1 dose of augmentin in 2015 and 2016, respectively    Peaches [peach (prunus persica)] Swelling     eyes    Shellfish containing products Swelling    Fig tree Swelling     itching    Tuberculin spenser test ppd Rash       OBJECTIVE:     Vital Signs:  There were no vitals filed for this visit.    There is no height or weight on file to calculate BMI.     Physical Exam:  Physical Exam   Constitutional: He is oriented to person, place, and time and well-developed, well-nourished, and in no distress. No distress.   HENT:   Head: Normocephalic and atraumatic.   Eyes: Pupils are equal, round, and reactive to light. Conjunctivae and EOM are normal. Right eye exhibits no discharge. Left eye exhibits no discharge.   Neck: Normal range of motion. No JVD present.   Cardiovascular: Normal rate, regular rhythm and normal heart sounds.   Pulmonary/Chest: Effort normal and breath sounds normal. No respiratory distress. He has no wheezes. He has no rales.   Abdominal: Soft. Bowel sounds are normal. He exhibits no distension. There is no tenderness. There is no rebound.   Musculoskeletal: He exhibits edema (interphalengeal joint swelling, right arm swelling and Rt posterior elbow local swelling) and tenderness.   Limited Rt thumb interphalangeal flexion with increased warmth   Neurological: He is alert and oriented to person, place, and time. Gait normal.   Skin: Skin is warm and dry. He is not diaphoretic. There is erythema.   Psychiatric: Mood, memory, affect and judgment normal.          Laboratory  Lab Results   Component Value Date    WBC 6.44 03/09/2020    HGB 14.4 03/09/2020    HCT 45.5 03/09/2020    MCV 94 03/09/2020     03/09/2020     Lab Results   Component Value Date     (H) 05/22/2020     (H) 05/22/2020     05/22/2020     05/22/2020    K 4.3 05/22/2020    K 4.3 05/22/2020     05/22/2020     05/22/2020    CO2 27 05/22/2020    CO2 27  05/22/2020    BUN 30 (H) 05/22/2020    BUN 30 (H) 05/22/2020    CREATININE 1.6 (H) 05/22/2020    CREATININE 1.6 (H) 05/22/2020    CALCIUM 9.3 05/22/2020    CALCIUM 9.3 05/22/2020    MG 1.9 08/30/2018     Lab Results   Component Value Date    INR 0.9 04/25/2019    INR 0.9 01/15/2019    INR 0.9 09/06/2018     Lab Results   Component Value Date    HGBA1C 6.2 (H) 12/04/2019     No results for input(s): POCTGLUCOSE in the last 72 hours.        ASSESSMENT & PLAN:   Mr. Audrey Oneil Jr. is a 70 y.o. male Chronic gout without tophus, HTN, CKD3, hx DVT and PE in 2007 (s/p Coumadinn x 1 year per Cards note), CAD who presented to the clinic complaining of Rt thumb pain and swelling for 2 days and Rt posterior elbow localized swelling for 2 weeks. Thumb is erythematous and tender with limited interphalangeal flexion. Reports taking Colchicine today. Has had similar flare from his gout in the past responding to 2 days of Colchicine. No fever or chills, to suggest alarming signs of septic arthritis, however giving it's acute onset, it's part of the DDx. Gout flare would be most likely. Another possibility would by DVT giving swollen Rt arm as well and Hx of DVT requiring Coumadin in the past.    Localized swelling of right thumb  -     colchicine (COLCRYS) 0.6 mg tablet; Take 1 tablet (0.6 mg total) by mouth twice in the first day then once daily.  Dispense: 20 tablet; Refill: 0  -     Ambulatory referral/consult to Orthopedics; Future; Expected date: 06/09/2020 for the possible need for aspiration for confirmation and ruling out septic asrthritis  -     US Upper Extremity Veins Right; Future; Expected date: 06/02/2020  -     CBC auto differential; Future; Expected date: 06/02/2020  -     Uric acid; Future; Expected date: 06/02/2020    Chronic gout without tophus, unspecified cause, unspecified site    Swelling of right elbow  -     colchicine (COLCRYS) 0.6 mg tablet; Take 1 tablet (0.6 mg total) by mouth once daily.   Dispense: 20 tablet; Refill: 0  -     US Upper Extremity Veins Right; Future; Expected date: 06/02/2020  -     CBC auto differential; Future; Expected date: 06/02/2020  -     Uric acid; Future; Expected date: 06/02/2020          RTC in     Sheree Ang MD  Internal Medicine PGY2  [facility]  [address] [place] [id number]  [phone]    I have discussed A/P with Dr Agn and agree with plan of action.  Dejan Haas.

## 2020-06-04 ENCOUNTER — TELEPHONE (OUTPATIENT)
Dept: INTERNAL MEDICINE | Facility: CLINIC | Age: 70
End: 2020-06-04

## 2020-06-04 NOTE — TELEPHONE ENCOUNTER
Called the patient and updated him on his recent labs.     He states his pain has improved significantly and the swelling improved as well. Now able to flex his thumb more. No fever/chills.    His Uric acid trended down 7.9 < 8.7. CBC WNL, no leukocytosis.    Instructed the patient to take his Allopurinol BID as he was only taking it daily. Keep taking the colchicine.    Sheree Pineda MD  IM, PGY2  [facility]

## 2020-06-15 ENCOUNTER — HOSPITAL ENCOUNTER (OUTPATIENT)
Dept: CARDIOLOGY | Facility: HOSPITAL | Age: 70
Discharge: HOME | End: 2020-06-15
Attending: INTERNAL MEDICINE
Payer: MEDICARE

## 2020-06-15 VITALS
SYSTOLIC BLOOD PRESSURE: 122 MMHG | DIASTOLIC BLOOD PRESSURE: 80 MMHG | HEIGHT: 67 IN | HEART RATE: 78 BPM | WEIGHT: 250 LBS | BODY MASS INDEX: 39.24 KG/M2

## 2020-06-15 DIAGNOSIS — I50.42 CHRONIC COMBINED SYSTOLIC AND DIASTOLIC CONGESTIVE HEART FAILURE: ICD-10-CM

## 2020-06-15 DIAGNOSIS — I25.10 CORONARY ARTERY DISEASE INVOLVING NATIVE CORONARY ARTERY OF NATIVE HEART WITHOUT ANGINA PECTORIS: ICD-10-CM

## 2020-06-15 DIAGNOSIS — I12.9 HYPERTENSIVE KIDNEY DISEASE WITH STAGE 3 CHRONIC KIDNEY DISEASE: ICD-10-CM

## 2020-06-15 DIAGNOSIS — I11.0 HYPERTENSIVE HEART DISEASE WITH HEART FAILURE: ICD-10-CM

## 2020-06-15 DIAGNOSIS — N18.30 HYPERTENSIVE KIDNEY DISEASE WITH STAGE 3 CHRONIC KIDNEY DISEASE: ICD-10-CM

## 2020-06-15 DIAGNOSIS — I25.5 CARDIOMYOPATHY, ISCHEMIC: ICD-10-CM

## 2020-06-15 LAB
ASCENDING AORTA: 3.31 CM
AV INDEX (PROSTH): 0.51
AV MEAN GRADIENT: 4 MMHG
AV PEAK GRADIENT: 8 MMHG
AV VALVE AREA: 1.78 CM2
AV VELOCITY RATIO: 0.54
BSA FOR ECHO PROCEDURE: 2.32 M2
CV ECHO LV RWT: 0.29 CM
DOP CALC AO PEAK VEL: 1.43 M/S
DOP CALC AO VTI: 25.11 CM
DOP CALC LVOT AREA: 3.5 CM2
DOP CALC LVOT DIAMETER: 2.11 CM
DOP CALC LVOT PEAK VEL: 0.77 M/S
DOP CALC LVOT STROKE VOLUME: 44.73 CM3
DOP CALCLVOT PEAK VEL VTI: 12.8 CM
E WAVE DECELERATION TIME: 227.44 MSEC
E/A RATIO: 0.48
E/E' RATIO: 12.57 M/S
ECHO LV POSTERIOR WALL: 1 CM (ref 0.6–1.1)
FRACTIONAL SHORTENING: 7 % (ref 28–44)
INTERVENTRICULAR SEPTUM: 0.53 CM (ref 0.6–1.1)
IVRT: 179.83 MSEC
LA MAJOR: 5.5 CM
LA MINOR: 5.61 CM
LA WIDTH: 3.8 CM
LEFT ATRIUM SIZE: 3.89 CM
LEFT ATRIUM VOLUME INDEX: 31.4 ML/M2
LEFT ATRIUM VOLUME: 69.79 CM3
LEFT INTERNAL DIMENSION IN SYSTOLE: 6.5 CM (ref 2.1–4)
LEFT VENTRICLE DIASTOLIC VOLUME INDEX: 113.68 ML/M2
LEFT VENTRICLE DIASTOLIC VOLUME: 252.73 ML
LEFT VENTRICLE MASS INDEX: 103 G/M2
LEFT VENTRICLE SYSTOLIC VOLUME INDEX: 97 ML/M2
LEFT VENTRICLE SYSTOLIC VOLUME: 215.69 ML
LEFT VENTRICULAR INTERNAL DIMENSION IN DIASTOLE: 6.97 CM (ref 3.5–6)
LEFT VENTRICULAR MASS: 229.83 G
LV LATERAL E/E' RATIO: 14.67 M/S
LV SEPTAL E/E' RATIO: 11 M/S
MV PEAK A VEL: 0.91 M/S
MV PEAK E VEL: 0.44 M/S
MV STENOSIS PRESSURE HALF TIME: 65.96 MS
MV VALVE AREA P 1/2 METHOD: 3.34 CM2
PISA TR MAX VEL: 1.8 M/S
PULM VEIN S/D RATIO: 0.78
PV PEAK D VEL: 0.36 M/S
PV PEAK S VEL: 0.28 M/S
RA MAJOR: 4 CM
RA PRESSURE: 3 MMHG
RA WIDTH: 3.21 CM
RIGHT VENTRICULAR END-DIASTOLIC DIMENSION: 2.51 CM
RV TISSUE DOPPLER FREE WALL SYSTOLIC VELOCITY 1 (APICAL 4 CHAMBER VIEW): 11.49 CM/S
SINUS: 3.13 CM
STJ: 3.05 CM
TDI LATERAL: 0.03 M/S
TDI SEPTAL: 0.04 M/S
TDI: 0.04 M/S
TR MAX PG: 13 MMHG
TRICUSPID ANNULAR PLANE SYSTOLIC EXCURSION: 1.02 CM
TV REST PULMONARY ARTERY PRESSURE: 16 MMHG

## 2020-06-15 PROCEDURE — C8929 TTE W OR WO FOL WCON,DOPPLER: HCPCS

## 2020-06-15 PROCEDURE — 93306 TTE W/DOPPLER COMPLETE: CPT | Mod: 26,,, | Performed by: INTERNAL MEDICINE

## 2020-06-15 PROCEDURE — 63600175 PHARM REV CODE 636 W HCPCS: Performed by: INTERNAL MEDICINE

## 2020-06-15 PROCEDURE — 93306 ECHO (CUPID ONLY): ICD-10-PCS | Mod: 26,,, | Performed by: INTERNAL MEDICINE

## 2020-06-15 RX ADMIN — HUMAN ALBUMIN MICROSPHERES AND PERFLUTREN 0.66 MG: 10; .22 INJECTION, SOLUTION INTRAVENOUS at 04:06

## 2020-06-15 NOTE — NURSING
Patient identified by 2 identifiers. Denies previous reactions to blood transfusions, allergies reviewed & procedure explained.  22 g IV placed to Lt FA, flushed w/ 10cc NS pre & post contrast administration.  3cc Optison administered by sonographer, echo images obtained.  Pt tolerated procedure well.  IV D/C'ed, preasure dsg applied.  Pt D/C'ed to home.

## 2020-06-18 DIAGNOSIS — N18.30 HYPERTENSIVE KIDNEY DISEASE WITH STAGE 3 CHRONIC KIDNEY DISEASE: Primary | ICD-10-CM

## 2020-06-18 DIAGNOSIS — I50.42 CHRONIC COMBINED SYSTOLIC AND DIASTOLIC CONGESTIVE HEART FAILURE: ICD-10-CM

## 2020-06-18 DIAGNOSIS — I12.9 HYPERTENSIVE KIDNEY DISEASE WITH STAGE 3 CHRONIC KIDNEY DISEASE: Primary | ICD-10-CM

## 2020-06-18 DIAGNOSIS — I25.5 CARDIOMYOPATHY, ISCHEMIC: Chronic | ICD-10-CM

## 2020-06-18 RX ORDER — LISINOPRIL 10 MG/1
10 TABLET ORAL DAILY
Qty: 30 TABLET | Refills: 1 | Status: SHIPPED | OUTPATIENT
Start: 2020-06-18 | End: 2020-06-18

## 2020-06-19 ENCOUNTER — TELEPHONE (OUTPATIENT)
Dept: TRANSPLANT | Facility: CLINIC | Age: 70
End: 2020-06-19

## 2020-06-19 NOTE — PROGRESS NOTES
Will ask HF nurse to check on him and would like for him to start lisinopril 10 mg qd with BMP 2 weeks later (lab from May can serve as baseline; would like Entresto but need to assure he can afford and tolerate ACE first) and plan to up-titrate further  See me in 2-6 weeks to continue to up-titrate meds as heart function did not improve with bi-v pacemaker

## 2020-06-19 NOTE — TELEPHONE ENCOUNTER
1245 pm : F/U on result review note from Dr. Rubio 6/18/20 8:38 pm:   Called and spoke with pt at this time.    Assessment: pt does not weigh.  Denies sob   Denies swelling  States feels good    Informed pt dr. Henry has reviewed his blood work and u/s of his heart   And reviewed these results some with pt  Pt in agreement to start Lisinopril 10 mg once daily -told [facility] pharmacy where Dr. Henry sent it and pt said will pick it up today and start taking it tomorrow.  Pt will have lab work Thursday July 2nd @ 0930 am since will have other appt that day ( I have scheduled this lab appt and added to 7/2 nurse lab phone review)   Told pt he would like to see him in clinic in 2-6 weeks and I will ask someone to call and schedule this with him    Message sent to HF RUEL Lynn to schedule this visit and labs

## 2020-06-29 DIAGNOSIS — M79.644 THUMB PAIN, RIGHT: Primary | ICD-10-CM

## 2020-07-01 ENCOUNTER — PATIENT OUTREACH (OUTPATIENT)
Dept: ADMINISTRATIVE | Facility: OTHER | Age: 70
End: 2020-07-01

## 2020-07-01 NOTE — PROGRESS NOTES
Requested updates within Care Everywhere.  Patient's chart was reviewed for overdue TERRY topics.  Immunizations reconciled.    Orders placed:n/a  Labs Linked:n/a

## 2020-07-12 ENCOUNTER — PATIENT OUTREACH (OUTPATIENT)
Dept: ADMINISTRATIVE | Facility: OTHER | Age: 70
End: 2020-07-12

## 2020-07-13 NOTE — PROGRESS NOTES
Requested updates within Care Everywhere.  Patient's chart was reviewed for overdue TERRY topics.  Immunizations reconciled.    Orders placed:n/a  Tasked appts:n/a  Labs Linked:n/a

## 2020-07-14 ENCOUNTER — HOSPITAL ENCOUNTER (OUTPATIENT)
Dept: RADIOLOGY | Facility: HOSPITAL | Age: 70
Discharge: HOME | End: 2020-07-14
Attending: ORTHOPAEDIC SURGERY
Payer: MEDICARE

## 2020-07-14 ENCOUNTER — OFFICE VISIT (OUTPATIENT)
Dept: ORTHOPEDICS | Facility: CLINIC | Age: 70
End: 2020-07-14
Payer: MEDICARE

## 2020-07-14 VITALS
DIASTOLIC BLOOD PRESSURE: 73 MMHG | BODY MASS INDEX: 38.63 KG/M2 | HEART RATE: 74 BPM | WEIGHT: 246.13 LBS | TEMPERATURE: 97 F | HEIGHT: 67 IN | SYSTOLIC BLOOD PRESSURE: 124 MMHG

## 2020-07-14 DIAGNOSIS — M79.644 THUMB PAIN, RIGHT: Primary | ICD-10-CM

## 2020-07-14 DIAGNOSIS — M79.644 THUMB PAIN, RIGHT: ICD-10-CM

## 2020-07-14 DIAGNOSIS — R22.31 LOCALIZED SWELLING OF RIGHT THUMB: ICD-10-CM

## 2020-07-14 DIAGNOSIS — M65.341 TRIGGER FINGER, RIGHT RING FINGER: Primary | ICD-10-CM

## 2020-07-14 PROCEDURE — 3078F PR MOST RECENT DIASTOLIC BLOOD PRESSURE < 80 MM HG: ICD-10-PCS | Mod: CPTII,S$GLB,, | Performed by: ORTHOPAEDIC SURGERY

## 2020-07-14 PROCEDURE — 99214 OFFICE O/P EST MOD 30 MIN: CPT | Mod: 25,S$GLB,, | Performed by: ORTHOPAEDIC SURGERY

## 2020-07-14 PROCEDURE — 1159F PR MEDICATION LIST DOCUMENTED IN MEDICAL RECORD: ICD-10-PCS | Mod: S$GLB,,, | Performed by: ORTHOPAEDIC SURGERY

## 2020-07-14 PROCEDURE — 3008F BODY MASS INDEX DOCD: CPT | Mod: CPTII,S$GLB,, | Performed by: ORTHOPAEDIC SURGERY

## 2020-07-14 PROCEDURE — 99999 PR PBB SHADOW E&M-EST. PATIENT-LVL III: CPT | Mod: PBBFAC,,, | Performed by: ORTHOPAEDIC SURGERY

## 2020-07-14 PROCEDURE — 20550 NJX 1 TENDON SHEATH/LIGAMENT: CPT | Mod: F8,S$GLB,, | Performed by: ORTHOPAEDIC SURGERY

## 2020-07-14 PROCEDURE — 20550 TENDON SHEATH: R RING MCP: ICD-10-PCS | Mod: F8,S$GLB,, | Performed by: ORTHOPAEDIC SURGERY

## 2020-07-14 PROCEDURE — 1101F PT FALLS ASSESS-DOCD LE1/YR: CPT | Mod: CPTII,S$GLB,, | Performed by: ORTHOPAEDIC SURGERY

## 2020-07-14 PROCEDURE — 3078F DIAST BP <80 MM HG: CPT | Mod: CPTII,S$GLB,, | Performed by: ORTHOPAEDIC SURGERY

## 2020-07-14 PROCEDURE — 1159F MED LIST DOCD IN RCRD: CPT | Mod: S$GLB,,, | Performed by: ORTHOPAEDIC SURGERY

## 2020-07-14 PROCEDURE — 73130 XR HAND COMPLETE 3 VIEW RIGHT: ICD-10-PCS | Mod: 26,RT,, | Performed by: RADIOLOGY

## 2020-07-14 PROCEDURE — 3074F SYST BP LT 130 MM HG: CPT | Mod: CPTII,S$GLB,, | Performed by: ORTHOPAEDIC SURGERY

## 2020-07-14 PROCEDURE — 73130 X-RAY EXAM OF HAND: CPT | Mod: 26,RT,, | Performed by: RADIOLOGY

## 2020-07-14 PROCEDURE — 3074F PR MOST RECENT SYSTOLIC BLOOD PRESSURE < 130 MM HG: ICD-10-PCS | Mod: CPTII,S$GLB,, | Performed by: ORTHOPAEDIC SURGERY

## 2020-07-14 PROCEDURE — 99214 PR OFFICE/OUTPT VISIT, EST, LEVL IV, 30-39 MIN: ICD-10-PCS | Mod: 25,S$GLB,, | Performed by: ORTHOPAEDIC SURGERY

## 2020-07-14 PROCEDURE — 3008F PR BODY MASS INDEX (BMI) DOCUMENTED: ICD-10-PCS | Mod: CPTII,S$GLB,, | Performed by: ORTHOPAEDIC SURGERY

## 2020-07-14 PROCEDURE — 1101F PR PT FALLS ASSESS DOC 0-1 FALLS W/OUT INJ PAST YR: ICD-10-PCS | Mod: CPTII,S$GLB,, | Performed by: ORTHOPAEDIC SURGERY

## 2020-07-14 PROCEDURE — 99999 PR PBB SHADOW E&M-EST. PATIENT-LVL III: ICD-10-PCS | Mod: PBBFAC,,, | Performed by: ORTHOPAEDIC SURGERY

## 2020-07-14 PROCEDURE — 73130 X-RAY EXAM OF HAND: CPT | Mod: TC,RT

## 2020-07-14 RX ORDER — DEXAMETHASONE SODIUM PHOSPHATE 4 MG/ML
4 INJECTION, SOLUTION INTRA-ARTICULAR; INTRALESIONAL; INTRAMUSCULAR; INTRAVENOUS; SOFT TISSUE
Status: DISCONTINUED | OUTPATIENT
Start: 2020-07-14 | End: 2020-07-14 | Stop reason: HOSPADM

## 2020-07-14 RX ORDER — LISINOPRIL 10 MG/1
TABLET ORAL
COMMUNITY
Start: 2020-06-18 | End: 2020-07-17 | Stop reason: SDUPTHER

## 2020-07-14 RX ADMIN — DEXAMETHASONE SODIUM PHOSPHATE 4 MG: 4 INJECTION, SOLUTION INTRA-ARTICULAR; INTRALESIONAL; INTRAMUSCULAR; INTRAVENOUS; SOFT TISSUE at 11:07

## 2020-07-14 NOTE — PROCEDURES
Tendon Sheath: R ring MCP    Date/Time: 7/14/2020 11:00 AM  Performed by: Harley Magallanes MD  Authorized by: Harley Magallanes MD     Consent Done?:  Yes (Verbal)  Indications:  Pain  Site marked: the procedure site was marked    Timeout: prior to procedure the correct patient, procedure, and site was verified    Prep: patient was prepped and draped in usual sterile fashion      Local anesthesia used?: Yes    Local anesthetic: Topical spray prior to injection and 1cc 1% plain lidocaine in the injectate.  Location:  Ring finger  Site:  R ring MCP  Ultrasonic guidance for needle placement?: No    Needle size:  25 G  Approach:  Volar  Medications:  4 mg dexamethasone 4 mg/mL  Patient tolerance:  Patient tolerated the procedure well with no immediate complications

## 2020-07-14 NOTE — PROGRESS NOTES
[facility]  History & Physical  Orthopedics    SUBJECTIVE:      COVID-19 attestation:  This patient was treated during the COVID-19 pandemic.  This was discussed with the patient, they are aware of our current policies and procedures, were given the option of delaying their visit and or switching to a virtual visit, delaying their surgery when applicable, and they elect to proceed.    Chief Complaint:Right RF pain    Referring Provider: Sheree Pineda MD     History of Present Illness:  Patient is a 70 y.o. right hand dominant male PMH gout who presents today with complaints of right ring finger pain and decreased range of motion. He has a history of carpal tunnel release and thumb trigger finger release on the same side with Dr. Zapata in 2018. He is satisfied with the results of these procedures. The pain in his RF has been present for about a year. He had some triggering of this digit, but this has improved. Last triggering was 4 months ago. He denies any numbness or tingling. He feels like his ability to  is lessened by the pain in his hand.     The patient is a/an retired .    Onset of symptoms/DOI was 1 year ago with acute exacerbation.    Symptoms are aggravated by activity and movement.    Symptoms are alleviated by rest and medication.    Symptoms consist of pain, swelling and decreased ROM.    The patient rates their pain as a 4/10.    Attempted treatment(s) and/or interventions include rest and anti-inflammatory medications.     The patient denies any fevers, chills, N/V, D/C and presents for evaluation.       Past Medical History:   Diagnosis Date    AICD (automatic cardioverter/defibrillator) present 12/13/2015      Anticoagulant long-term use     CHF (congestive heart failure)     Chronic anticoagulation 5/5/2016    Chronic combined systolic and diastolic heart failure 11/26/2012    EF 10-20% on ECHO 2013    Clotting disorder     Coronary  artery disease involving native coronary artery of native heart without angina pectoris 11/26/2012    Cath 10- Stents patent non-obstructive disease Cath 11-12015 non-obstructive disease     Diverticulosis of colon     DVT (deep venous thrombosis), unspecified laterality 11/12/2015    Essential hypertension 11/15/2015    Hyperlipidemia     Hypertensive heart disease with heart failure 5/5/2016    MI (myocardial infarction) 2009    x's 5    Nicotine abuse     Obesity 11/26/2012    Pulmonary embolism 2011    Renal disorder     LAVERNE    Stented coronary artery 11/26/2012    LAD stent placed 10/17/2007      Past Surgical History:   Procedure Laterality Date    APPENDECTOMY      BACK SURGERY      CARDIAC DEFIBRILLATOR PLACEMENT      CARDIAC SURGERY  2007    stent    CARPAL TUNNEL RELEASE Right 04/2018    INSERTION OF BIVENTRICULAR IMPLANTABLE CARDIOVERTER-DEFIBRILLATOR (ICD) N/A 5/3/2019    Procedure: INSERTION, ICD, BIVENTRICULAR;  Surgeon: Teofilo Pal MD;  Location: [facility] EP LAB;  Service: Cardiology;  Laterality: N/A;  ICH CM,  ICD UPGD BI-V,  SJM, MAC, FAS, 3PREP (dual ICD INSITU)    r knee scope      REVISION OF SKIN POCKET FOR CARDIOVERTER-DEFIBRILLATOR Left 5/3/2019    Procedure: Revision, Skin Pocket, For Cardioverter-Defibrillator;  Surgeon: Teofilo Pal MD;  Location: [facility] EP LAB;  Service: Cardiology;  Laterality: Left;    SPINE SURGERY      TONSILLECTOMY      TRIGGER FINGER RELEASE Right 04/2018    thumb     Review of patient's allergies indicates:   Allergen Reactions    Iodine containing multivitamin Swelling     itching    Keflex [cephalexin] Swelling     Eyes.  Tolerated multiple doses of zosyn and 1 dose of augmentin in 2015 and 2016, respectively    Peaches [peach (prunus persica)] Swelling     eyes    Shellfish containing products Swelling    Fig tree Swelling     itching    Tuberculin spenser test ppd Rash     Social History     Social History Narrative     Not on file     Family History   Problem Relation Age of Onset    Cancer Mother         colon cancer    Heart disease Mother     Diabetes Mother     Heart disease Father     Stroke Father     Colon polyps Father     Cancer Paternal Grandmother         leukemia    No Known Problems Sister     No Known Problems Daughter     No Known Problems Son     No Known Problems Sister     No Known Problems Son          Current Outpatient Medications:     allopurinol (ZYLOPRIM) 100 MG tablet, Take 1 tablet (100 mg total) by mouth 2 (two) times daily., Disp: 180 tablet, Rfl: 3    amiodarone (PACERONE) 200 MG Tab, Take 1.5 tablets (300 mg total) by mouth once daily., Disp: 135 tablet, Rfl: 3    aspirin (ECOTRIN) 325 MG EC tablet, Take 325 mg by mouth every evening. , Disp: , Rfl:     atorvastatin (LIPITOR) 80 MG tablet, TAKE 1 TABLET(80 MG) BY MOUTH EVERY DAY, Disp: 90 tablet, Rfl: 0    clopidogreL (PLAVIX) 75 mg tablet, TAKE 1 TABLET(75 MG) BY MOUTH EVERY DAY, Disp: 90 tablet, Rfl: 0    colchicine (COLCRYS) 0.6 mg tablet, Take 1 tablet (0.6 mg total) by mouth once daily., Disp: 20 tablet, Rfl: 0    docusate sodium (COLACE) 50 MG capsule, Take 1 capsule (50 mg total) by mouth 2 (two) times daily., Disp: 30 capsule, Rfl: 0    furosemide (LASIX) 40 MG tablet, Take 2 tablets (80 mg total) by mouth every evening., Disp: 180 tablet, Rfl: 3    HYDROcodone-acetaminophen (NORCO)  mg per tablet, Take 1 tablet by mouth every 6 (six) hours as needed for Pain., Disp: , Rfl:     metoprolol succinate (TOPROL-XL) 50 MG 24 hr tablet, TAKE 1 TABLET(50 MG) BY MOUTH EVERY EVENING, Disp: 90 tablet, Rfl: 0    potassium chloride SA (K-DUR,KLOR-CON) 20 MEQ tablet, Take 1 tablet (20 mEq total) by mouth once daily., Disp: 30 tablet, Rfl: 3    spironolactone (ALDACTONE) 25 MG tablet, Take 1 tablet (25 mg total) by mouth once daily., Disp: 30 tablet, Rfl: 5    lisinopriL 10 MG tablet, , Disp: , Rfl:   No current  "facility-administered medications for this visit.     Facility-Administered Medications Ordered in Other Visits:     0.9%  NaCl infusion, , Intravenous, Continuous, Matilde French NP    vancomycin in dextrose 5 % 1 gram/250 mL IVPB 1,000 mg, 1,000 mg, Intravenous, On Call Procedure, Matilde French NP, 500 mg at 05/03/19 1051      Review of Systems:  Constitutional: no fever or chills  Eyes: no visual changes  ENT: no nasal congestion or sore throat  Respiratory: no cough or shortness of breath  Cardiovascular: no chest pain  Gastrointestinal: no nausea or vomiting, tolerating diet  Musculoskeletal: pain, soreness and decreased ROM    OBJECTIVE:      Vital Signs (Most Recent):  Vitals:    07/14/20 1048   BP: 124/73   BP Location: Right arm   Patient Position: Sitting   BP Method: Medium (Automatic)   Pulse: 74   Temp: 97 °F (36.1 °C)   Weight: 111.7 kg (246 lb 2.3 oz)   Height: 5' 7" (1.702 m)     Body mass index is 38.55 kg/m².      Physical Exam:  Constitutional: The patient appears well-developed and well-nourished. No distress.   Head: Normocephalic and atraumatic.   Nose: Nose normal.   Eyes: Conjunctivae and EOM are normal.   Neck: No tracheal deviation present.   Cardiovascular: Normal rate and intact distal pulses.    Pulmonary/Chest: Effort normal. No respiratory distress.   Abdominal: There is no guarding.   Neurological: The patient is alert.   Psychiatric: The patient has a normal mood and affect.     Right Hand/Wrist Examination:    Observation/Inspection:  Swelling  Diffusel    Deformity  Contracture of RF  Discoloration  none     Scars   Carpal tunnel and thumb TF release, well healed    Atrophy  none    HAND/WRIST EXAMINATION:  Finkelstein's Test   Neg  WHAT Test    Pos  Snuff box tenderness   Neg  Magana's Test    Neg  Hook of Hamate Tenderness  Neg  CMC grind    Neg  Circumduction test   Neg    Neurovascular Exam:  Digits WWP, brisk CR < 3s throughout  NVI motor/LTS to M/R/U nerves, " radial pulse 2+  Tinel's Test - Carpal Tunnel  Neg  Tinel's Test - Cubital Tunnel  Neg  Phalen's Test    Neg  Median Nerve Compression Test Neg    Contracture of FR with firm endpoint, TTP along RF A1 pulley    ROM hand/wrist/elbow full, painless except when noted above    RRR  CTAB  Abd S/NT/ND +BS    Diagnostic Results:     Xray - Mild DJD throughout, no fracture or dislocation   EMG - None    ASSESSMENT/PLAN:      Audrey Oneil Jr. is a 70 y.o. male with R RF trigger finger.   Plan:   1)   Both operative and non operative management were discussed with the patient.  This included conservative management, steroid injection, operative release of the A1 pulley.  2)    Patient elected to proceed with steroid injection today, see procedure note.  3)   Patient will return to clinic as needed.        Harley Magallanes M.D.     Please be aware that this note has been generated with the assistance of [employer] voice-to-text.  Please excuse any spelling or grammatical errors.

## 2020-07-14 NOTE — LETTER
July 14, 2020      Sheree Pineda MD  [address]  [facility] [address]           [facility]  [address]  [facility] [address]  Phone: [phone]          Patient: Audrey Oneil Jr.   MR Number: [id number]   YOB: 1950   Date of Visit: 7/14/2020       Dear Dr. Sheree Pineda:    Thank you for referring Audrey Oneil to me for evaluation. Attached you will find relevant portions of my assessment and plan of care.    If you have questions, please do not hesitate to call me. I look forward to following Audrey Oneil along with you.    Sincerely,    Harley Magallanes MD    Enclosure  CC:  No Recipients    If you would like to receive this communication electronically, please contact [email or web address] or [phone] to request more information on [employer] Link access.    For providers and/or their staff who would like to refer a patient to [facility], please contact us through our one-stop-shop provider referral line, [facility], at [phone].    If you feel you have received this communication in error or would no longer like to receive these types of communications, please e-mail [email or web address]

## 2020-07-17 ENCOUNTER — LAB VISIT (OUTPATIENT)
Dept: LAB | Facility: HOSPITAL | Age: 70
End: 2020-07-17
Attending: INTERNAL MEDICINE
Payer: MEDICARE

## 2020-07-17 ENCOUNTER — OFFICE VISIT (OUTPATIENT)
Dept: TRANSPLANT | Facility: CLINIC | Age: 70
End: 2020-07-17
Attending: INTERNAL MEDICINE
Payer: MEDICARE

## 2020-07-17 VITALS
BODY MASS INDEX: 38.62 KG/M2 | HEART RATE: 77 BPM | DIASTOLIC BLOOD PRESSURE: 68 MMHG | SYSTOLIC BLOOD PRESSURE: 112 MMHG | HEIGHT: 67 IN | WEIGHT: 246.06 LBS

## 2020-07-17 DIAGNOSIS — N18.30 HYPERTENSIVE KIDNEY DISEASE WITH STAGE 3 CHRONIC KIDNEY DISEASE: ICD-10-CM

## 2020-07-17 DIAGNOSIS — N28.9 ACUTE RENAL INSUFFICIENCY: ICD-10-CM

## 2020-07-17 DIAGNOSIS — I25.5 CARDIOMYOPATHY, ISCHEMIC: Chronic | ICD-10-CM

## 2020-07-17 DIAGNOSIS — E78.5 HYPERLIPIDEMIA LDL GOAL <70: ICD-10-CM

## 2020-07-17 DIAGNOSIS — I12.9 HYPERTENSIVE KIDNEY DISEASE WITH STAGE 3 CHRONIC KIDNEY DISEASE: ICD-10-CM

## 2020-07-17 DIAGNOSIS — I11.0 HYPERTENSIVE HEART DISEASE WITH HEART FAILURE: ICD-10-CM

## 2020-07-17 DIAGNOSIS — I50.42 CHRONIC COMBINED SYSTOLIC AND DIASTOLIC CONGESTIVE HEART FAILURE: Primary | ICD-10-CM

## 2020-07-17 DIAGNOSIS — I25.10 CORONARY ARTERY DISEASE INVOLVING NATIVE CORONARY ARTERY OF NATIVE HEART WITHOUT ANGINA PECTORIS: ICD-10-CM

## 2020-07-17 DIAGNOSIS — E66.01 SEVERE OBESITY (BMI 35.0-39.9) WITH COMORBIDITY: Chronic | ICD-10-CM

## 2020-07-17 DIAGNOSIS — Z95.810 PRESENCE OF CARDIAC RESYNCHRONIZATION THERAPY DEFIBRILLATOR (CRT-D): ICD-10-CM

## 2020-07-17 DIAGNOSIS — I50.42 CHRONIC COMBINED SYSTOLIC AND DIASTOLIC CONGESTIVE HEART FAILURE: ICD-10-CM

## 2020-07-17 LAB
ANION GAP SERPL CALC-SCNC: 10 MMOL/L (ref 8–16)
BNP SERPL-MCNC: 61 PG/ML (ref 0–99)
BUN SERPL-MCNC: 31 MG/DL (ref 8–23)
CALCIUM SERPL-MCNC: 9 MG/DL (ref 8.7–10.5)
CHLORIDE SERPL-SCNC: 106 MMOL/L (ref 95–110)
CO2 SERPL-SCNC: 25 MMOL/L (ref 23–29)
CREAT SERPL-MCNC: 1.3 MG/DL (ref 0.5–1.4)
EST. GFR  (AFRICAN AMERICAN): >60 ML/MIN/1.73 M^2
EST. GFR  (NON AFRICAN AMERICAN): 56.1 ML/MIN/1.73 M^2
GLUCOSE SERPL-MCNC: 103 MG/DL (ref 70–110)
POTASSIUM SERPL-SCNC: 4.1 MMOL/L (ref 3.5–5.1)
SODIUM SERPL-SCNC: 141 MMOL/L (ref 136–145)

## 2020-07-17 PROCEDURE — 3008F BODY MASS INDEX DOCD: CPT | Mod: CPTII,S$GLB,, | Performed by: INTERNAL MEDICINE

## 2020-07-17 PROCEDURE — 99213 PR OFFICE/OUTPT VISIT, EST, LEVL III, 20-29 MIN: ICD-10-PCS | Mod: S$GLB,,, | Performed by: INTERNAL MEDICINE

## 2020-07-17 PROCEDURE — 3078F PR MOST RECENT DIASTOLIC BLOOD PRESSURE < 80 MM HG: ICD-10-PCS | Mod: CPTII,S$GLB,, | Performed by: INTERNAL MEDICINE

## 2020-07-17 PROCEDURE — 1159F PR MEDICATION LIST DOCUMENTED IN MEDICAL RECORD: ICD-10-PCS | Mod: S$GLB,,, | Performed by: INTERNAL MEDICINE

## 2020-07-17 PROCEDURE — 3074F SYST BP LT 130 MM HG: CPT | Mod: CPTII,S$GLB,, | Performed by: INTERNAL MEDICINE

## 2020-07-17 PROCEDURE — 36415 COLL VENOUS BLD VENIPUNCTURE: CPT

## 2020-07-17 PROCEDURE — 1126F PR PAIN SEVERITY QUANTIFIED, NO PAIN PRESENT: ICD-10-PCS | Mod: S$GLB,,, | Performed by: INTERNAL MEDICINE

## 2020-07-17 PROCEDURE — 83880 ASSAY OF NATRIURETIC PEPTIDE: CPT

## 2020-07-17 PROCEDURE — 1101F PR PT FALLS ASSESS DOC 0-1 FALLS W/OUT INJ PAST YR: ICD-10-PCS | Mod: CPTII,S$GLB,, | Performed by: INTERNAL MEDICINE

## 2020-07-17 PROCEDURE — 99999 PR PBB SHADOW E&M-EST. PATIENT-LVL IV: CPT | Mod: PBBFAC,,, | Performed by: INTERNAL MEDICINE

## 2020-07-17 PROCEDURE — 80048 BASIC METABOLIC PNL TOTAL CA: CPT

## 2020-07-17 PROCEDURE — 3078F DIAST BP <80 MM HG: CPT | Mod: CPTII,S$GLB,, | Performed by: INTERNAL MEDICINE

## 2020-07-17 PROCEDURE — 99213 OFFICE O/P EST LOW 20 MIN: CPT | Mod: S$GLB,,, | Performed by: INTERNAL MEDICINE

## 2020-07-17 PROCEDURE — 1101F PT FALLS ASSESS-DOCD LE1/YR: CPT | Mod: CPTII,S$GLB,, | Performed by: INTERNAL MEDICINE

## 2020-07-17 PROCEDURE — 1126F AMNT PAIN NOTED NONE PRSNT: CPT | Mod: S$GLB,,, | Performed by: INTERNAL MEDICINE

## 2020-07-17 PROCEDURE — 99999 PR PBB SHADOW E&M-EST. PATIENT-LVL IV: ICD-10-PCS | Mod: PBBFAC,,, | Performed by: INTERNAL MEDICINE

## 2020-07-17 PROCEDURE — 1159F MED LIST DOCD IN RCRD: CPT | Mod: S$GLB,,, | Performed by: INTERNAL MEDICINE

## 2020-07-17 PROCEDURE — 3008F PR BODY MASS INDEX (BMI) DOCUMENTED: ICD-10-PCS | Mod: CPTII,S$GLB,, | Performed by: INTERNAL MEDICINE

## 2020-07-17 PROCEDURE — 3074F PR MOST RECENT SYSTOLIC BLOOD PRESSURE < 130 MM HG: ICD-10-PCS | Mod: CPTII,S$GLB,, | Performed by: INTERNAL MEDICINE

## 2020-07-17 RX ORDER — FUROSEMIDE 40 MG/1
TABLET ORAL
Qty: 180 TABLET | Refills: 3 | Status: ON HOLD
Start: 2020-07-17 | End: 2021-01-22 | Stop reason: SDUPTHER

## 2020-07-17 RX ORDER — POTASSIUM CHLORIDE 20 MEQ/1
TABLET, EXTENDED RELEASE ORAL
Qty: 30 TABLET | Refills: 3
Start: 2020-07-17 | End: 2020-08-03

## 2020-07-17 RX ORDER — LISINOPRIL 20 MG/1
20 TABLET ORAL DAILY
Qty: 30 TABLET | Refills: 5 | Status: SHIPPED | OUTPATIENT
Start: 2020-07-17 | End: 2021-02-04 | Stop reason: SDUPTHER

## 2020-07-17 RX ORDER — METOPROLOL SUCCINATE 100 MG/1
100 TABLET, EXTENDED RELEASE ORAL DAILY
Qty: 90 TABLET | Refills: 1 | Status: SHIPPED | OUTPATIENT
Start: 2020-07-17 | End: 2021-04-05 | Stop reason: SDUPTHER

## 2020-07-17 NOTE — LETTER
July 23, 2020        Elvin Simms Jr.  [address]  [place] [address]  Phone: [phone]  Fax: [phone]             [facility]  [address]  [facility] [address]  Phone: [phone]   Patient: Audrey Oneil Jr.   MR Number: [id number]   YOB: 1950   Date of Visit: 7/17/2020       Dear Dr. Elvin Simms Jr.    Thank you for referring Audrey Oneil to me for evaluation. Attached you will find relevant portions of my assessment and plan of care.    If you have questions, please do not hesitate to call me. I look forward to following Audrey Oneil along with you.    Sincerely,    Migue Henry Jr, MD    Enclosure    If you would like to receive this communication electronically, please contact [email or web address] or [phone] to request [employer] Link access.    [employer] Link is a tool which provides read-only access to select patient information with whom you have a relationship. Its easy to use and provides real time access to review your patients record including encounter summaries, notes, results, and demographic information.    If you feel you have received this communication in error or would no longer like to receive these types of communications, please e-mail [email or web address]

## 2020-07-17 NOTE — PATIENT INSTRUCTIONS
Reduce furosemide to once a week (2 tablets, same time)  Take potassium 20 meq only when you take furosemide  Increase lisinopril to 20 mg (I sent in script for 20 mg tablet) but if you want to use up 10 mg take 2 tablets/day  Lab check 7/27  On 7/27 if you feel OK want to increase the metoprolol succinate to 100 mg a day--I sent in the script for 100 mg tablet but if you want to use up 50 mg take 2 tablets/day

## 2020-07-17 NOTE — PROGRESS NOTES
"Subjective:     Patient ID:  Audrey Oneil Jr. is a 70 y.o. male who presents for follow-up of Congestive Heart Failure    HPI:  69 yo WM with CAD s/p STEMI (s/p PCI LAD 2007), CHF, ischemic cardiomyopathy, PE (2010), HTN, DLP comes for a regular follow-up.  He underwent upgrade to CRT-D device 5/3/19.  At visit May 2020 I reduced lasix and KCL M-W-F with lasix.  I did not change in computer should he require daily but today he reports that was not necessary.  On 6/18/20 instructed over phone to start lisinopril 10 mg qd.  After BMP fine 7/14/20 increased lisinopril to 20 mg qd but he elected to wait to see me today.  By echo  6/15/20 his heart function did not improve with bi-v pacemaker placed 5/3/19.  He comes today for planned visit and reports that he has been feeling "OK" though some fatigue and HENSELY thinks improved.  Intolerant to CPAP.  No angina. He continues to gain wt slowly, 5# since last visit 2 months ago.    ROS not repeated except CV and as above     Objective:   Physical Exam   Constitutional: No distress.   /68 (BP Location: Right arm, Patient Position: Sitting, BP Method: Medium (Manual))   Pulse 77   Ht 5' 7" (1.702 m)   Wt 111.6 kg (246 lb 0.5 oz)   BMI 38.53 kg/m²   Last visit wt 109.6 kg (241 lb 10 oz)   Visit before last visit wt 102 kg (224 lb 13.9 oz)  Obese WM in NAD   HENT:   Head: Normocephalic and atraumatic.   Eyes: Conjunctivae are normal. Right eye exhibits no discharge. Left eye exhibits no discharge. No scleral icterus.   Neck: No JVD present. No thyromegaly present.   Cardiovascular: Normal rate, regular rhythm and normal heart sounds. Exam reveals no gallop.   No murmur heard.  Pulmonary/Chest: Effort normal and breath sounds normal.   Abdominal: Soft. Bowel sounds are normal. He exhibits distension (obese). He exhibits no mass. There is no abdominal tenderness. There is no rebound and no guarding.   Musculoskeletal:         General: No tenderness or edema (stasis " changes both legs ).   Skin: Skin is warm and dry. He is not diaphoretic.      6/15/2020 ECHO Conclusion  · Moderate left ventricular enlargement.  · Severely decreased left ventricular systolic function. The estimated ejection fraction is 15-20%.  · Segmental wall motion abnormalities. Ventricular septum is thinned and akinetic.  · Grade I (mild) left ventricular diastolic dysfunction consistent with impaired relaxation.  · Normal right ventricular size. Mildly reduced right ventricular systolic function.  · Mild mitral regurgitation.  · Normal central venous pressure (3 mmHg).  · The estimated PA systolic pressure is 16 mmHg.     Lab Results   Component Value Date    BNP 61 07/17/2020     (H) 06/15/2020     (H) 03/09/2020     Lab Results   Component Value Date     07/17/2020    K 4.1 07/17/2020     07/17/2020    CO2 25 07/17/2020    BUN 31 (H) 07/17/2020    CREATININE 1.3 07/17/2020    CALCIUM 9.0 07/17/2020    ANIONGAP 10 07/17/2020    ESTGFRAFRICA >60.0 07/17/2020    EGFRNONAA 56.1 (A) 07/17/2020       Assessment:     1. Chronic combined systolic and diastolic congestive heart failure    2. Cardiomyopathy, ischemic    3. Hypertensive heart disease with heart failure    4. Coronary artery disease involving native coronary artery of native heart without angina pectoris    5. Hypertensive kidney disease with stage 3 chronic kidney disease    6. Hyperlipidemia LDL goal <70    7. Severe obesity (BMI 35.0-39.9) with comorbidity    8. Presence of cardiac resynchronization therapy defibrillator (CRT-D)      Plan:   Reduce furosemide to once a week (2 tablets, same time)  Take potassium 20 meq only when you take furosemide  Increase lisinopril to 20 mg (I sent in script for 20 mg tablet) but if you want to use up 10 mg take 2 tablets/day  Lab check 7/27  On 7/27 if you feel OK want to increase the metoprolol succinate to 100 mg a day (script sent to pharm)  We need to up-titrate his meds and I  should have him on target doses in 4-6 weeks.  If still symptomatic then and he is able to afford Entresto I will change to that therapy.  RTC with me in 4-6 weeks.  He has to work on diet, wt loss.  He cannot tolerate CPAP which I explained might help with weight loss.

## 2020-07-19 ENCOUNTER — CLINICAL SUPPORT (OUTPATIENT)
Dept: CARDIOLOGY | Facility: HOSPITAL | Age: 70
End: 2020-07-19
Payer: MEDICARE

## 2020-07-19 DIAGNOSIS — I47.20 VENTRICULAR TACHYCARDIA: ICD-10-CM

## 2020-07-19 DIAGNOSIS — Z95.810 PRESENCE OF AUTOMATIC (IMPLANTABLE) CARDIAC DEFIBRILLATOR: ICD-10-CM

## 2020-07-19 DIAGNOSIS — I50.9 HEART FAILURE, UNSPECIFIED: ICD-10-CM

## 2020-07-19 PROCEDURE — 93295 DEV INTERROG REMOTE 1/2/MLT: CPT | Mod: ,,, | Performed by: INTERNAL MEDICINE

## 2020-07-19 PROCEDURE — 93295 CARDIAC DEVICE CHECK - REMOTE: ICD-10-PCS | Mod: ,,, | Performed by: INTERNAL MEDICINE

## 2020-07-19 PROCEDURE — 93296 REM INTERROG EVL PM/IDS: CPT | Performed by: INTERNAL MEDICINE

## 2020-08-31 ENCOUNTER — TELEPHONE (OUTPATIENT)
Dept: TRANSPLANT | Facility: CLINIC | Age: 70
End: 2020-08-31

## 2020-08-31 ENCOUNTER — OFFICE VISIT (OUTPATIENT)
Dept: TRANSPLANT | Facility: CLINIC | Age: 70
End: 2020-08-31
Attending: INTERNAL MEDICINE
Payer: MEDICARE

## 2020-08-31 VITALS
DIASTOLIC BLOOD PRESSURE: 58 MMHG | BODY MASS INDEX: 38.79 KG/M2 | HEIGHT: 67 IN | WEIGHT: 247.13 LBS | HEART RATE: 61 BPM | SYSTOLIC BLOOD PRESSURE: 111 MMHG

## 2020-08-31 DIAGNOSIS — E66.01 SEVERE OBESITY (BMI 35.0-39.9) WITH COMORBIDITY: Chronic | ICD-10-CM

## 2020-08-31 DIAGNOSIS — I50.42 CHRONIC COMBINED SYSTOLIC AND DIASTOLIC CONGESTIVE HEART FAILURE: Primary | ICD-10-CM

## 2020-08-31 DIAGNOSIS — E78.5 HYPERLIPIDEMIA LDL GOAL <70: ICD-10-CM

## 2020-08-31 DIAGNOSIS — I13.0 HYPERTENSIVE CARDIOVASCULAR-RENAL DISEASE, STAGE 1-4 OR UNSPECIFIED CHRONIC KIDNEY DISEASE, WITH HEART FAILURE: ICD-10-CM

## 2020-08-31 DIAGNOSIS — I25.5 CARDIOMYOPATHY, ISCHEMIC: Chronic | ICD-10-CM

## 2020-08-31 DIAGNOSIS — Z95.810 PRESENCE OF CARDIAC RESYNCHRONIZATION THERAPY DEFIBRILLATOR (CRT-D): ICD-10-CM

## 2020-08-31 DIAGNOSIS — I25.10 CORONARY ARTERY DISEASE INVOLVING NATIVE CORONARY ARTERY OF NATIVE HEART WITHOUT ANGINA PECTORIS: ICD-10-CM

## 2020-08-31 PROCEDURE — 1101F PR PT FALLS ASSESS DOC 0-1 FALLS W/OUT INJ PAST YR: ICD-10-PCS | Mod: CPTII,S$GLB,, | Performed by: INTERNAL MEDICINE

## 2020-08-31 PROCEDURE — 99999 PR PBB SHADOW E&M-EST. PATIENT-LVL III: CPT | Mod: PBBFAC,,, | Performed by: INTERNAL MEDICINE

## 2020-08-31 PROCEDURE — 1126F PR PAIN SEVERITY QUANTIFIED, NO PAIN PRESENT: ICD-10-PCS | Mod: S$GLB,,, | Performed by: INTERNAL MEDICINE

## 2020-08-31 PROCEDURE — 1159F PR MEDICATION LIST DOCUMENTED IN MEDICAL RECORD: ICD-10-PCS | Mod: S$GLB,,, | Performed by: INTERNAL MEDICINE

## 2020-08-31 PROCEDURE — 3008F BODY MASS INDEX DOCD: CPT | Mod: CPTII,S$GLB,, | Performed by: INTERNAL MEDICINE

## 2020-08-31 PROCEDURE — 3078F DIAST BP <80 MM HG: CPT | Mod: CPTII,S$GLB,, | Performed by: INTERNAL MEDICINE

## 2020-08-31 PROCEDURE — 99213 PR OFFICE/OUTPT VISIT, EST, LEVL III, 20-29 MIN: ICD-10-PCS | Mod: S$GLB,,, | Performed by: INTERNAL MEDICINE

## 2020-08-31 PROCEDURE — 1159F MED LIST DOCD IN RCRD: CPT | Mod: S$GLB,,, | Performed by: INTERNAL MEDICINE

## 2020-08-31 PROCEDURE — 1101F PT FALLS ASSESS-DOCD LE1/YR: CPT | Mod: CPTII,S$GLB,, | Performed by: INTERNAL MEDICINE

## 2020-08-31 PROCEDURE — 99999 PR PBB SHADOW E&M-EST. PATIENT-LVL III: ICD-10-PCS | Mod: PBBFAC,,, | Performed by: INTERNAL MEDICINE

## 2020-08-31 PROCEDURE — 99213 OFFICE O/P EST LOW 20 MIN: CPT | Mod: S$GLB,,, | Performed by: INTERNAL MEDICINE

## 2020-08-31 PROCEDURE — 3078F PR MOST RECENT DIASTOLIC BLOOD PRESSURE < 80 MM HG: ICD-10-PCS | Mod: CPTII,S$GLB,, | Performed by: INTERNAL MEDICINE

## 2020-08-31 PROCEDURE — 3074F PR MOST RECENT SYSTOLIC BLOOD PRESSURE < 130 MM HG: ICD-10-PCS | Mod: CPTII,S$GLB,, | Performed by: INTERNAL MEDICINE

## 2020-08-31 PROCEDURE — 3008F PR BODY MASS INDEX (BMI) DOCUMENTED: ICD-10-PCS | Mod: CPTII,S$GLB,, | Performed by: INTERNAL MEDICINE

## 2020-08-31 PROCEDURE — 1126F AMNT PAIN NOTED NONE PRSNT: CPT | Mod: S$GLB,,, | Performed by: INTERNAL MEDICINE

## 2020-08-31 PROCEDURE — 3074F SYST BP LT 130 MM HG: CPT | Mod: CPTII,S$GLB,, | Performed by: INTERNAL MEDICINE

## 2020-08-31 RX ORDER — ATORVASTATIN CALCIUM 80 MG/1
80 TABLET, FILM COATED ORAL NIGHTLY
Qty: 90 TABLET | Refills: 3 | Status: SHIPPED | OUTPATIENT
Start: 2020-08-31 | End: 2021-07-02 | Stop reason: SDUPTHER

## 2020-08-31 NOTE — PROGRESS NOTES
"Subjective:     Patient ID:  Audrey Oneil Jr. is a 70 y.o. male who presents for follow-up of Congestive Heart Failure    HPI:  71 yo WM with CAD s/p STEMI (s/p PCI LAD 2007), CHF, ischemic cardiomyopathy, PE (2010), HTN, DLP comes for a regular follow-up.  He underwent upgrade to CRT-D device 5/3/19.  At visit May 2020 I reduced lasix and KCL M-W-F with lasix.  I did not change in computer should he require daily but today he reports that was not necessary.  On 6/18/20 instructed over phone to start lisinopril 10 mg qd.  After BMP fine 7/14/20 increased lisinopril to 20 mg qd but he elected to wait to see me today.  By echo  6/15/20 his heart function did not improve with bi-v pacemaker placed 5/3/19.      When seen July 2020 I reduced furosemide to once a week (2 tablets, same time) and take potassium 20 meq only when you take furosemide.  I increased lisinopril to 20 mg qd.  He did not come for f/u lab as directed.  He increased metoprolol to 100 mg qd as planned but did not check in.  Comes today for f/u visit.  Some pain from gland left axilla, come SOB but no other changes.    He cannot tolerate CPAP which I explained might help with weight loss.  He has not made any progress with weight loss but did not gain any weight.    Sleeping on side with 0-1 pillow.  No PND.  Some HENSLEY at couple of blocks.    ROS not repeated except CV and as above     Objective:   Physical Exam   Constitutional: No distress.   BP (!) 111/58 (BP Location: Right arm, Patient Position: Sitting, BP Method: Medium (Automatic))   Pulse 61   Ht 5' 7" (1.702 m)   Wt 112.1 kg (247 lb 2.2 oz)   BMI 38.71 kg/m²   Last visit wt 111.6 kg (246 lb 0.5 oz)   Prior 2 visits wts 242# and 225#  Obese WM in NAD   HENT:   Head: Normocephalic and atraumatic.   Eyes: Conjunctivae are normal. Right eye exhibits no discharge. Left eye exhibits no discharge. No scleral icterus.   Neck: No JVD present. No thyromegaly present.   Cardiovascular: Normal " rate, regular rhythm and normal heart sounds. Exam reveals no gallop.   No murmur heard.  Pulmonary/Chest: Effort normal and breath sounds normal.   Abdominal: Soft. Bowel sounds are normal. He exhibits distension (obese). He exhibits no mass. There is no abdominal tenderness. There is no rebound and no guarding.   Musculoskeletal:         General: No tenderness or edema (stasis changes both legs ).   Skin: Skin is warm and dry. He is not diaphoretic.      6/15/2020 ECHO Conclusion  · Moderate left ventricular enlargement.  · Severely decreased left ventricular systolic function. The estimated ejection fraction is 15-20%.  · Segmental wall motion abnormalities. Ventricular septum is thinned and akinetic.  · Grade I (mild) left ventricular diastolic dysfunction consistent with impaired relaxation.  · Normal right ventricular size. Mildly reduced right ventricular systolic function.  · Mild mitral regurgitation.  · Normal central venous pressure (3 mmHg).  · The estimated PA systolic pressure is 16 mmHg.     Assessment:     1. Chronic combined systolic and diastolic congestive heart failure    2. Cardiomyopathy, ischemic    3. Hypertensive cardiovascular-renal disease, stage 1-4 or unspecified chronic kidney disease, with heart failure    4. Coronary artery disease involving native coronary artery of native heart without angina pectoris    5. Hyperlipidemia LDL goal <70    6. Severe obesity (BMI 35.0-39.9) with comorbidity    7. Presence of cardiac resynchronization therapy defibrillator (CRT-D)      Plan:   Needs BMP and BNP to allow for up-titration of lisinopril--he will get today after sees primary  RTC 3 months    Addendum 1:20 PM:  He did not get lab checked--will have HF nurse call and arrange for lab

## 2020-08-31 NOTE — LETTER
August 31, 2020        Elvin Simms Jr.  [address]  [facility] [address]  Phone: [phone]  Fax: [phone]             [facility]  [address]  [facility] [address]  Phone: [phone]   Patient: Audrey Oneil Jr.   MR Number: [id number]   YOB: 1950   Date of Visit: 8/31/2020       Dear Dr. Elvin Simms Jr.    Thank you for referring Audrey Oneil to me for evaluation. Attached you will find relevant portions of my assessment and plan of care.    If you have questions, please do not hesitate to call me. I look forward to following Audrey Oneil along with you.    Sincerely,    Migue Henry Jr, MD    Enclosure    If you would like to receive this communication electronically, please contact [email or web address] or [phone] to request [employer] Link access.    [employer] Link is a tool which provides read-only access to select patient information with whom you have a relationship. Its easy to use and provides real time access to review your patients record including encounter summaries, notes, results, and demographic information.    If you feel you have received this communication in error or would no longer like to receive these types of communications, please e-mail [email or web address]

## 2020-08-31 NOTE — TELEPHONE ENCOUNTER
1:30 pm:  Attempted to reach pt at both phone numbers listed  The first I was told he was not there  The second, someone named Nathanale says he takes pts calls and messages because pt does not have a phone  Of note:  I noted pt was scheduled for clinic visit today with no lab appt   Appears dr. Esquivel ordered BMP/BNP at time of visit and appt coordinator scheduled it for noon today at internal medicine labs, but pt has not shown  Explained this to Nathanael and asked he have pt call me   He took my name and office contact number and will give message to pt that this lab work is needed and we can reschedule it          ----- Message from Migue Henry Jr., MD sent at 8/31/2020  1:26 PM CDT -----  Addendum 1:20 PM:He did not get lab checked--will have HF nurse call and arrange for lab

## 2020-09-03 PROBLEM — R79.89 ELEVATED LFTS: Status: RESOLVED | Noted: 2019-12-02 | Resolved: 2020-09-03

## 2020-09-18 DIAGNOSIS — R22.31 LOCALIZED SWELLING OF RIGHT THUMB: ICD-10-CM

## 2020-09-18 DIAGNOSIS — M25.421 SWELLING OF RIGHT ELBOW: ICD-10-CM

## 2020-09-18 RX ORDER — COLCHICINE 0.6 MG/1
0.6 TABLET ORAL DAILY
Qty: 20 TABLET | Refills: 0 | Status: ON HOLD | OUTPATIENT
Start: 2020-09-18 | End: 2020-12-21 | Stop reason: SDUPTHER

## 2020-09-18 NOTE — TELEPHONE ENCOUNTER
----- Message from Hiwot Campa sent at 9/18/2020 12:01 PM CDT -----  Contact: Joanna (sister) [phone]  Requesting an RX refill or new RX.  Is this a refill or new RX:    RX name and strength: colchicine (COLCRYS) 0.6 mg tablet  Directions (copy/paste from chart):  Route: Take 1 tablet (0.6 mg total) by mouth once daily. - Oral  Is this a 30 day or 90 day RX:    Local pharmacy or mail order pharmacy:    Pharmacy name and phone # (copy/paste from chart):   [id number] - [place] - [address] AT [facility] [address] [phone] (Phone)  [phone] (Fax)      Comments:

## 2020-09-26 ENCOUNTER — HOSPITAL ENCOUNTER (EMERGENCY)
Facility: HOSPITAL | Age: 70
Discharge: HOME | End: 2020-09-26
Attending: EMERGENCY MEDICINE
Payer: MEDICARE

## 2020-09-26 VITALS
SYSTOLIC BLOOD PRESSURE: 108 MMHG | DIASTOLIC BLOOD PRESSURE: 62 MMHG | HEART RATE: 62 BPM | RESPIRATION RATE: 20 BRPM | WEIGHT: 245 LBS | TEMPERATURE: 98 F | OXYGEN SATURATION: 100 % | BODY MASS INDEX: 38.45 KG/M2 | HEIGHT: 67 IN

## 2020-09-26 DIAGNOSIS — J40 BRONCHITIS: Primary | ICD-10-CM

## 2020-09-26 PROCEDURE — 99284 EMERGENCY DEPT VISIT MOD MDM: CPT | Mod: ER

## 2020-09-26 PROCEDURE — U0003 INFECTIOUS AGENT DETECTION BY NUCLEIC ACID (DNA OR RNA); SEVERE ACUTE RESPIRATORY SYNDROME CORONAVIRUS 2 (SARS-COV-2) (CORONAVIRUS DISEASE [COVID-19]), AMPLIFIED PROBE TECHNIQUE, MAKING USE OF HIGH THROUGHPUT TECHNOLOGIES AS DESCRIBED BY CMS-2020-01-R: HCPCS

## 2020-09-26 RX ORDER — ALBUTEROL SULFATE 90 UG/1
2 AEROSOL, METERED RESPIRATORY (INHALATION) EVERY 6 HOURS PRN
Qty: 6.7 G | Refills: 0 | Status: ON HOLD | OUTPATIENT
Start: 2020-09-26 | End: 2021-09-26 | Stop reason: HOSPADM

## 2020-09-26 RX ORDER — PREDNISONE 10 MG/1
10 TABLET ORAL DAILY
Qty: 5 TABLET | Refills: 0 | Status: SHIPPED | OUTPATIENT
Start: 2020-09-26 | End: 2020-10-01

## 2020-09-26 RX ORDER — AZITHROMYCIN 250 MG/1
TABLET, FILM COATED ORAL
Qty: 6 TABLET | Refills: 0 | Status: SHIPPED | OUTPATIENT
Start: 2020-09-26 | End: 2020-12-17

## 2020-09-26 NOTE — ED PROVIDER NOTES
Encounter Date: 9/26/2020       History     Chief Complaint   Patient presents with    Back Pain     c/o upper back pain x 1 day. denies trauma or injury to back    Nasal Congestion     c/o nasal congestion x 1 week. reports taking benadryl for symptoms but denies relief. denies fever, chills, SOB, C/P     Patient presents to the emergency department with complaints of 1 week of upper back pain associated with cough and congestion.  Patient denies any productivity to his cough states he intermittently wheezes.  Is no complaints fever but does complain of some general malaise.    The history is provided by the patient.     Review of patient's allergies indicates:   Allergen Reactions    Iodine containing multivitamin Swelling     itching    Keflex [cephalexin] Swelling     Eyes.  Tolerated multiple doses of zosyn and 1 dose of augmentin in 2015 and 2016, respectively    Peaches [peach (prunus persica)] Swelling     eyes    Shellfish containing products Swelling    Fig tree Swelling     itching    Tuberculin spenser test ppd Rash     Past Medical History:   Diagnosis Date    AICD (automatic cardioverter/defibrillator) present 12/13/2015      Anticoagulant long-term use     CHF (congestive heart failure)     Chronic anticoagulation 5/5/2016    Chronic combined systolic and diastolic heart failure 11/26/2012    EF 10-20% on ECHO 2013    Clotting disorder     Coronary artery disease involving native coronary artery of native heart without angina pectoris 11/26/2012    Cath 10- Stents patent non-obstructive disease Cath 11-12015 non-obstructive disease     Diverticulosis of colon     DVT (deep venous thrombosis), unspecified laterality 11/12/2015    Essential hypertension 11/15/2015    Hyperlipidemia     Hypertensive heart disease with heart failure 5/5/2016    MI (myocardial infarction) 2009    x's 5    Nicotine abuse     Obesity 11/26/2012    Pulmonary embolism 2011    Renal  disorder     LAVERNE    Stented coronary artery 2012    LAD stent placed 10/17/2007      Past Surgical History:   Procedure Laterality Date    APPENDECTOMY      BACK SURGERY      CARDIAC DEFIBRILLATOR PLACEMENT      CARDIAC SURGERY  2007    stent    CARPAL TUNNEL RELEASE Right 2018    INSERTION OF BIVENTRICULAR IMPLANTABLE CARDIOVERTER-DEFIBRILLATOR (ICD) N/A 5/3/2019    Procedure: INSERTION, ICD, BIVENTRICULAR;  Surgeon: Teofilo Pal MD;  Location: [facility];  Service: Cardiology;  Laterality: N/A;  ICH CM,  ICD UPGD BI-V,  SJM, MAC, FAS, 3PREP (dual ICD INSITU)    r knee scope      REVISION OF SKIN POCKET FOR CARDIOVERTER-DEFIBRILLATOR Left 5/3/2019    Procedure: Revision, Skin Pocket, For Cardioverter-Defibrillator;  Surgeon: Teofilo Pal MD;  Location: [facility];  Service: Cardiology;  Laterality: Left;    SPINE SURGERY      TONSILLECTOMY      TRIGGER FINGER RELEASE Right 2018    thumb     Family History   Problem Relation Age of Onset    Cancer Mother         colon cancer    Heart disease Mother     Diabetes Mother     Heart disease Father     Stroke Father     Colon polyps Father     Cancer Paternal Grandmother         leukemia    No Known Problems Sister     No Known Problems Daughter     No Known Problems Son     No Known Problems Sister     No Known Problems Son      Social History     Tobacco Use    Smoking status: Former Smoker     Packs/day: 1.00     Years: 45.00     Pack years: 45.00     Types: Cigarettes     Quit date: 2005     Years since quittin.7    Smokeless tobacco: Never Used    Tobacco comment: 1-1.5 ppd every day.   Substance Use Topics    Alcohol use: No     Frequency: Never    Drug use: No     Review of Systems   Constitutional: Negative.    HENT: Negative.    Eyes: Negative.    Respiratory: Positive for cough and wheezing (Intermittently). Negative for shortness of breath.    Cardiovascular: Negative.    Gastrointestinal:  Negative.    Endocrine: Negative.    Genitourinary: Negative.    Musculoskeletal: Negative.    Skin: Negative.    Allergic/Immunologic: Negative.    Neurological: Negative.    Hematological: Negative.    Psychiatric/Behavioral: Negative.    All other systems reviewed and are negative.      Physical Exam     Initial Vitals [09/26/20 0541]   BP Pulse Resp Temp SpO2   (!) 140/81 63 18 97.7 °F (36.5 °C) 96 %      MAP       --         Physical Exam    Nursing note and vitals reviewed.  Constitutional: Vital signs are normal. He appears well-developed. He is active and cooperative.   HENT:   Head: Normocephalic and atraumatic.   Right Ear: Hearing, tympanic membrane, external ear and ear canal normal.   Left Ear: Hearing, tympanic membrane, external ear and ear canal normal.   Nose: Nose normal.   Mouth/Throat: Uvula is midline, oropharynx is clear and moist and mucous membranes are normal.   Eyes: Conjunctivae, EOM and lids are normal. Pupils are equal, round, and reactive to light.   Neck: Trachea normal and full passive range of motion without pain. Neck supple. No thyroid mass present.   Cardiovascular: Normal rate, regular rhythm, S1 normal, S2 normal, normal heart sounds, intact distal pulses and normal pulses.   Pulmonary/Chest: Effort normal and breath sounds normal.   Abdominal: Soft. Normal appearance, normal aorta and bowel sounds are normal. There is no abdominal tenderness.   Musculoskeletal: Normal range of motion.   Lymphadenopathy:     He has no axillary adenopathy.   Neurological: He is alert and oriented to person, place, and time.   Skin: Skin is warm, dry and intact.   Psychiatric: He has a normal mood and affect. His speech is normal and behavior is normal. Judgment and thought content normal. Cognition and memory are normal.         ED Course   Procedures  Labs Reviewed   SARS-COV-2 (COVID-19) QUALITATIVE PCR          Imaging Results    None                                      Clinical Impression:      ICD-10-CM ICD-9-CM   1. Bronchitis  J40 490                          ED Disposition Condition    Discharge Stable        ED Prescriptions     Medication Sig Dispense Start Date End Date Auth. Provider    albuterol (PROVENTIL/VENTOLIN HFA) 90 mcg/actuation inhaler Inhale 2 puffs into the lungs every 6 (six) hours as needed for Wheezing. Rescue 6.7 g 9/26/2020  Isiah Oh MD    predniSONE (DELTASONE) 10 MG tablet Take 1 tablet (10 mg total) by mouth once daily. for 5 days 5 tablet 9/26/2020 10/1/2020 Isiah Oh MD    azithromycin (Z-GILDA) 250 MG tablet Take 2 tablets by mouth on day 1; Take 1 tablet by mouth on days 2-5 6 tablet 9/26/2020  Isiah Oh MD        Follow-up Information     Follow up With Specialties Details Why Contact Info    January Khan MD Internal Medicine Schedule an appointment as soon as possible for a visit in 1 week  [address]  [place] [address]  [phone]                                         Isiah Oh MD  09/26/20 0628

## 2020-09-27 LAB — SARS-COV-2 RNA RESP QL NAA+PROBE: NOT DETECTED

## 2020-10-15 ENCOUNTER — PES CALL (OUTPATIENT)
Dept: ADMINISTRATIVE | Facility: CLINIC | Age: 70
End: 2020-10-15

## 2020-11-15 ENCOUNTER — CLINICAL SUPPORT (OUTPATIENT)
Dept: CARDIOLOGY | Facility: HOSPITAL | Age: 70
End: 2020-11-15
Attending: INTERNAL MEDICINE
Payer: MEDICARE

## 2020-11-15 DIAGNOSIS — Z95.810 AICD (AUTOMATIC CARDIOVERTER/DEFIBRILLATOR) PRESENT: ICD-10-CM

## 2020-11-15 DIAGNOSIS — I25.5 ISCHEMIC CARDIOMYOPATHY: ICD-10-CM

## 2020-11-15 PROCEDURE — 93295 CARDIAC DEVICE CHECK - REMOTE: ICD-10-PCS | Mod: ,,, | Performed by: INTERNAL MEDICINE

## 2020-11-15 PROCEDURE — 93295 DEV INTERROG REMOTE 1/2/MLT: CPT | Mod: ,,, | Performed by: INTERNAL MEDICINE

## 2020-11-15 PROCEDURE — 93296 REM INTERROG EVL PM/IDS: CPT | Performed by: INTERNAL MEDICINE

## 2020-12-11 ENCOUNTER — TELEPHONE (OUTPATIENT)
Dept: ELECTROPHYSIOLOGY | Facility: CLINIC | Age: 70
End: 2020-12-11

## 2020-12-11 DIAGNOSIS — M1A.9XX0 CHRONIC GOUT WITHOUT TOPHUS, UNSPECIFIED CAUSE, UNSPECIFIED SITE: ICD-10-CM

## 2020-12-11 RX ORDER — ALLOPURINOL 100 MG/1
100 TABLET ORAL 2 TIMES DAILY
Qty: 180 TABLET | Refills: 3 | Status: SHIPPED | OUTPATIENT
Start: 2020-12-11 | End: 2021-01-01 | Stop reason: SDUPTHER

## 2020-12-11 NOTE — TELEPHONE ENCOUNTER
Spoke with brother in law who said he was the patient contact. Requested that he have patient call [facility].

## 2020-12-14 DIAGNOSIS — Z95.810 ICD (IMPLANTABLE CARDIOVERTER-DEFIBRILLATOR) IN PLACE: ICD-10-CM

## 2020-12-14 DIAGNOSIS — I50.41 ACUTE COMBINED SYSTOLIC AND DIASTOLIC CONGESTIVE HEART FAILURE: ICD-10-CM

## 2020-12-14 DIAGNOSIS — I25.5 ISCHEMIC CARDIOMYOPATHY: ICD-10-CM

## 2020-12-14 DIAGNOSIS — Z79.899 HIGH RISK MEDICATIONS (NOT ANTICOAGULANTS) LONG-TERM USE: ICD-10-CM

## 2020-12-14 DIAGNOSIS — Z95.810 CARDIAC DEFIBRILLATOR IN PLACE: ICD-10-CM

## 2020-12-14 DIAGNOSIS — Z95.810 AICD (AUTOMATIC CARDIOVERTER/DEFIBRILLATOR) PRESENT: Primary | ICD-10-CM

## 2020-12-17 ENCOUNTER — HOSPITAL ENCOUNTER (EMERGENCY)
Facility: HOSPITAL | Age: 70
Discharge: HOME | End: 2020-12-17
Attending: EMERGENCY MEDICINE
Payer: MEDICARE

## 2020-12-17 VITALS
RESPIRATION RATE: 18 BRPM | SYSTOLIC BLOOD PRESSURE: 116 MMHG | BODY MASS INDEX: 37.67 KG/M2 | WEIGHT: 240 LBS | HEIGHT: 67 IN | TEMPERATURE: 98 F | OXYGEN SATURATION: 98 % | DIASTOLIC BLOOD PRESSURE: 64 MMHG | HEART RATE: 62 BPM

## 2020-12-17 DIAGNOSIS — M79.646 FINGER PAIN: ICD-10-CM

## 2020-12-17 DIAGNOSIS — L03.012 CELLULITIS OF FINGER OF LEFT HAND: Primary | ICD-10-CM

## 2020-12-17 PROCEDURE — 99283 EMERGENCY DEPT VISIT LOW MDM: CPT | Mod: 25,ER

## 2020-12-17 PROCEDURE — 25000003 PHARM REV CODE 250: Mod: ER | Performed by: PHYSICIAN ASSISTANT

## 2020-12-17 RX ORDER — SULFAMETHOXAZOLE AND TRIMETHOPRIM 800; 160 MG/1; MG/1
1 TABLET ORAL
Status: COMPLETED | OUTPATIENT
Start: 2020-12-17 | End: 2020-12-17

## 2020-12-17 RX ORDER — SULFAMETHOXAZOLE AND TRIMETHOPRIM 800; 160 MG/1; MG/1
1 TABLET ORAL 2 TIMES DAILY
Qty: 14 TABLET | Refills: 0 | Status: ON HOLD | OUTPATIENT
Start: 2020-12-17 | End: 2020-12-21 | Stop reason: HOSPADM

## 2020-12-17 RX ADMIN — SULFAMETHOXAZOLE AND TRIMETHOPRIM 1 TABLET: 800; 160 TABLET ORAL at 11:12

## 2020-12-17 NOTE — FIRST PROVIDER EVALUATION
" Emergency Department TeleTriage Encounter Note      CHIEF COMPLAINT    Chief Complaint   Patient presents with    Finger Pain     states hit RIGHT POINTER FINGER with hammer on monday. states increased swelling last night and a small "bite" to the finger. finger obviously swollen, redness, hot to touch       VITAL SIGNS   Initial Vitals [12/17/20 1005]   BP Pulse Resp Temp SpO2   116/64 62 18 97.7 °F (36.5 °C) 98 %      MAP       --            ALLERGIES    Review of patient's allergies indicates:   Allergen Reactions    Iodine containing multivitamin Swelling     itching    Keflex [cephalexin] Swelling     Eyes.  Tolerated multiple doses of zosyn and 1 dose of augmentin in 2015 and 2016, respectively    Peaches [peach (prunus persica)] Swelling     eyes    Shellfish containing products Swelling    Fig tree Swelling     itching    Tuberculin spenser test ppd Rash       PROVIDER TRIAGE NOTE  Patient is a 70-year-old male who presents with pain to the left hand.  Triage note is incorrect his left.  He reports associated swelling and redness.      ORDERS  Labs Reviewed - No data to display    ED Orders (720h ago, onward)    Start Ordered     Status Ordering Provider    12/17/20 1025 12/17/20 1024  X-Ray Hand 2 View Left  1 time imaging      Ordered JOSÉ MIGUEL ULLOA            Virtual Visit Note: The provider triage portion of this emergency department evaluation and documentation was performed via [employer], a HIPAA-compliant telemedicine application, in concert with a tele-presenter in the room. A face to face patient evaluation with one of my colleagues will occur once the patient is placed in an emergency department room.      DISCLAIMER: This note was prepared with [employer] voice recognition transcription software. Garbled syntax, mangled pronouns, and other bizarre constructions may be attributed to that software system.  "

## 2020-12-17 NOTE — ED PROVIDER NOTES
"Encounter Date: 12/17/2020    SCRIBE #1 NOTE: I, Kelly Chan, am scribing for, and in the presence of,  Stone Vidal PA-C. I have scribed the following portions of the note - Other sections scribed: HPI, ROS, PE.       History     Chief Complaint   Patient presents with    Finger Pain     states hit RIGHT POINTER FINGER with hammer on monday. states increased swelling last night and a small "bite" to the finger. finger obviously swollen, redness, hot to touch     Audrey Oneil Jr. is a 70 y.o. male who presents to the ED complaining of left first finger pain for x2 days. Pt states he accidentally hit his finger with a hammer and notice tightness, warmth, and swelling to the area. He also states noticing what looked to be a bite to the area. He denies having any generalized myalgias or fever. He states that his finger will not bend 2/2 swelling. He has tried to apply both ice and heat to the area with no relief. He did not try any medications for pain or swelling relief. He is allergic to PPD, Keflex, Iodine, and shellfish.     The history is provided by the patient.     Review of patient's allergies indicates:   Allergen Reactions    Iodine containing multivitamin Swelling     itching    Keflex [cephalexin] Swelling     Eyes.  Tolerated multiple doses of zosyn and 1 dose of augmentin in 2015 and 2016, respectively    Peaches [peach (prunus persica)] Swelling     eyes    Shellfish containing products Swelling    Fig tree Swelling     itching    Tuberculin spenser test ppd Rash     Past Medical History:   Diagnosis Date    AICD (automatic cardioverter/defibrillator) present 12/13/2015      Anticoagulant long-term use     CHF (congestive heart failure)     Chronic anticoagulation 5/5/2016    Chronic combined systolic and diastolic heart failure 11/26/2012    EF 10-20% on ECHO 2013    Clotting disorder     Coronary artery disease involving native coronary artery of native heart without angina " pectoris 11/26/2012    Cath 10- Stents patent non-obstructive disease Cath 11-12015 non-obstructive disease     Diverticulosis of colon     DVT (deep venous thrombosis), unspecified laterality 11/12/2015    Essential hypertension 11/15/2015    Hyperlipidemia     Hypertensive heart disease with heart failure 5/5/2016    MI (myocardial infarction) 2009    x's 5    Nicotine abuse     Obesity 11/26/2012    Pulmonary embolism 2011    Renal disorder     LAVERNE    Stented coronary artery 11/26/2012    LAD stent placed 10/17/2007      Past Surgical History:   Procedure Laterality Date    APPENDECTOMY      BACK SURGERY      CARDIAC DEFIBRILLATOR PLACEMENT      CARDIAC SURGERY  2007    stent    CARPAL TUNNEL RELEASE Right 04/2018    INSERTION OF BIVENTRICULAR IMPLANTABLE CARDIOVERTER-DEFIBRILLATOR (ICD) N/A 5/3/2019    Procedure: INSERTION, ICD, BIVENTRICULAR;  Surgeon: Teofilo Pal MD;  Location: [facility];  Service: Cardiology;  Laterality: N/A;  ICH CM,  ICD UPGD BI-V,  SJM, MAC, FAS, 3PREP (dual ICD INSITU)    r knee scope      REVISION OF SKIN POCKET FOR CARDIOVERTER-DEFIBRILLATOR Left 5/3/2019    Procedure: Revision, Skin Pocket, For Cardioverter-Defibrillator;  Surgeon: Teofilo Pal MD;  Location: [facility];  Service: Cardiology;  Laterality: Left;    SPINE SURGERY      TONSILLECTOMY      TRIGGER FINGER RELEASE Right 04/2018    thumb     Family History   Problem Relation Age of Onset    Cancer Mother         colon cancer    Heart disease Mother     Diabetes Mother     Heart disease Father     Stroke Father     Colon polyps Father     Cancer Paternal Grandmother         leukemia    No Known Problems Sister     No Known Problems Daughter     No Known Problems Son     No Known Problems Sister     No Known Problems Son      Social History     Tobacco Use    Smoking status: Former Smoker     Packs/day: 1.00     Years: 45.00     Pack years: 45.00     Types:  Cigarettes     Quit date: 12/14/2005     Years since quitting: 15.0    Smokeless tobacco: Never Used    Tobacco comment: 1-1.5 ppd every day.   Substance Use Topics    Alcohol use: No     Frequency: Never    Drug use: No     Review of Systems   Constitutional: Negative for chills and fever.   HENT: Negative for sore throat.    Respiratory: Negative for shortness of breath.    Cardiovascular: Negative for chest pain.   Gastrointestinal: Negative for nausea.   Genitourinary: Negative for dysuria.   Musculoskeletal: Positive for arthralgias and joint swelling. Negative for back pain.   Skin: Positive for color change. Negative for rash.   Neurological: Negative for weakness.   Hematological: Does not bruise/bleed easily.   All other systems reviewed and are negative.      Physical Exam     Initial Vitals [12/17/20 1005]   BP Pulse Resp Temp SpO2   116/64 62 18 97.7 °F (36.5 °C) 98 %      MAP       --         Physical Exam    Constitutional: He appears well-developed and well-nourished.   HENT:   Head: Normocephalic and atraumatic.   Right Ear: External ear normal.   Left Ear: External ear normal.   Mouth/Throat: Oropharynx is clear and moist. No oropharyngeal exudate.   Moist mucous membranes.   Eyes: EOM are normal. Pupils are equal, round, and reactive to light.   Neck: Neck supple.   Cardiovascular: Normal rate, regular rhythm and intact distal pulses.   Pulmonary/Chest: No respiratory distress.   Musculoskeletal: Normal range of motion. No edema.        Left hand: He exhibits tenderness and swelling.      Comments: No pain with passive extension to 2nd left finger.  No tenderness to flexor tendon.  Erythema and edema to proximal phalanx, through dorsal hand to 2nd metacarpal.   Neurological: He is alert and oriented to person, place, and time. He has normal strength. No sensory deficit.   Skin: Skin is warm and dry. Capillary refill takes less than 2 seconds. There is erythema.   Psychiatric: He has a normal  mood and affect.         ED Course   Procedures  Labs Reviewed - No data to display       Imaging Results          X-Ray Hand 2 View Left (Final result)  Result time 12/17/20 11:27:27    Final result by Prudencio Hannon DO (12/17/20 11:27:27)                 Impression:      No acute fracture or dislocation of the left hand.      Electronically signed by: Prudencio Hannon  Date:    12/17/2020  Time:    11:27             Narrative:    EXAMINATION:  XR HAND 2 VIEW LEFT    CLINICAL HISTORY:  Pain in unspecified finger(s).    TECHNIQUE:  PA and lateral radiographs of the left hand    COMPARISON:  Left hand radiographs from 10/11/2017.    FINDINGS:  Bone mineralization is normal.  There is no acute fracture or dislocation of the left hand.  Alignment is normal.  There is mild scattered joint space narrowing of the left hand.  There are osteophytes of the index finger proximal phalangeal joint and the thumb metacarpophalangeal joint.                              X-Rays:   Independently Interpreted Readings:   Other Readings:  X-ray left finger with no evidence of fracture, subcu gas, or radiopaque foreign body    Medical Decision Making:   Clinical Tests:   Radiological Study: Ordered and Reviewed  ED Management:  70-year-old male with erythema and swelling to the proximal left index finger extending to the dorsal hand.  No systemic symptoms or fever.  He has mild tenderness over the erythema with no fluctuance or evidence of drainable abscess.  Is no evidence of flexor tenosynovitis or necrotizing fasciitis on exam.  Will start patient on antibiotics.  He was given strict instructions to return if no improvement after 2 days of antibiotics, or if symptoms worsen.            Scribe Attestation:   Scribe #1: I performed the above scribed service and the documentation accurately describes the services I performed. I attest to the accuracy of the note.     I, Stone Vidal, personally performed the services described in this  documentation. All medical record entries made by the scribe were at my direction and in my presence.  I have reviewed the chart and agree that the record reflects my personal performance and is accurate and complete                    Clinical Impression:       ICD-10-CM ICD-9-CM   1. Cellulitis of finger of left hand  L03.012 681.00   2. Finger pain  M79.646 729.5                          ED Disposition Condition    Discharge Stable        ED Prescriptions     Medication Sig Dispense Start Date End Date Auth. Provider    sulfamethoxazole-trimethoprim 800-160mg (BACTRIM DS) 800-160 mg Tab Take 1 tablet by mouth 2 (two) times daily. for 7 days 14 tablet 12/17/2020 12/24/2020 Stone Vidal PA-C        Follow-up Information     Follow up With Specialties Details Why Contact Info    [facility] Emergency Medicine Go to  If symptoms worsen despite taking antibiotics [address] [facility] [address] [phone]    January Khan MD Internal Medicine Schedule an appointment as soon as possible for a visit  [facility] [address]  [phone]                                         Stone Vidal PA-C  12/17/20 1143

## 2020-12-18 ENCOUNTER — HOSPITAL ENCOUNTER (OUTPATIENT)
Facility: HOSPITAL | Age: 70
Discharge: HOME | End: 2020-12-21
Attending: EMERGENCY MEDICINE | Admitting: HOSPITALIST
Payer: MEDICARE

## 2020-12-18 DIAGNOSIS — M10.9 ACUTE GOUT OF LEFT HAND, UNSPECIFIED CAUSE: ICD-10-CM

## 2020-12-18 DIAGNOSIS — R22.31 LOCALIZED SWELLING OF RIGHT THUMB: ICD-10-CM

## 2020-12-18 DIAGNOSIS — R07.9 CHEST PAIN: ICD-10-CM

## 2020-12-18 DIAGNOSIS — M25.421 SWELLING OF RIGHT ELBOW: ICD-10-CM

## 2020-12-18 DIAGNOSIS — L03.114 CELLULITIS OF LEFT HAND: Primary | ICD-10-CM

## 2020-12-18 LAB
ANION GAP SERPL CALC-SCNC: 10 MMOL/L (ref 8–16)
BASOPHILS # BLD AUTO: 0.05 K/UL (ref 0–0.2)
BASOPHILS NFR BLD: 0.5 % (ref 0–1.9)
BNP SERPL-MCNC: 199 PG/ML (ref 0–99)
BUN SERPL-MCNC: 17 MG/DL (ref 8–23)
BUN SERPL-MCNC: 23 MG/DL (ref 6–30)
CALCIUM SERPL-MCNC: 8.7 MG/DL (ref 8.7–10.5)
CHLORIDE SERPL-SCNC: 104 MMOL/L (ref 95–110)
CHLORIDE SERPL-SCNC: 107 MMOL/L (ref 95–110)
CO2 SERPL-SCNC: 22 MMOL/L (ref 23–29)
CREAT SERPL-MCNC: 1.2 MG/DL (ref 0.5–1.4)
CREAT SERPL-MCNC: 1.2 MG/DL (ref 0.5–1.4)
CRP SERPL-MCNC: 21.2 MG/L (ref 0–8.2)
CTP QC/QA: YES
DIFFERENTIAL METHOD: ABNORMAL
EOSINOPHIL # BLD AUTO: 0.1 K/UL (ref 0–0.5)
EOSINOPHIL NFR BLD: 1 % (ref 0–8)
ERYTHROCYTE [DISTWIDTH] IN BLOOD BY AUTOMATED COUNT: 13 % (ref 11.5–14.5)
ERYTHROCYTE [SEDIMENTATION RATE] IN BLOOD BY WESTERGREN METHOD: 20 MM/HR (ref 0–23)
EST. GFR  (AFRICAN AMERICAN): >60 ML/MIN/1.73 M^2
EST. GFR  (NON AFRICAN AMERICAN): >60 ML/MIN/1.73 M^2
ESTIMATED AVG GLUCOSE: 114 MG/DL (ref 68–131)
GLUCOSE SERPL-MCNC: 101 MG/DL (ref 70–110)
GLUCOSE SERPL-MCNC: 132 MG/DL (ref 70–110)
HBA1C MFR BLD HPLC: 5.6 % (ref 4–5.6)
HCT VFR BLD AUTO: 49.7 % (ref 40–54)
HCT VFR BLD CALC: 50 %PCV (ref 36–54)
HGB BLD-MCNC: 15.6 G/DL (ref 14–18)
IMM GRANULOCYTES # BLD AUTO: 0.04 K/UL (ref 0–0.04)
IMM GRANULOCYTES NFR BLD AUTO: 0.4 % (ref 0–0.5)
LYMPHOCYTES # BLD AUTO: 2.2 K/UL (ref 1–4.8)
LYMPHOCYTES NFR BLD: 20.9 % (ref 18–48)
MAGNESIUM SERPL-MCNC: 1.9 MG/DL (ref 1.6–2.6)
MCH RBC QN AUTO: 30.6 PG (ref 27–31)
MCHC RBC AUTO-ENTMCNC: 31.4 G/DL (ref 32–36)
MCV RBC AUTO: 98 FL (ref 82–98)
MONOCYTES # BLD AUTO: 1.1 K/UL (ref 0.3–1)
MONOCYTES NFR BLD: 10.2 % (ref 4–15)
NEUTROPHILS # BLD AUTO: 7.2 K/UL (ref 1.8–7.7)
NEUTROPHILS NFR BLD: 67 % (ref 38–73)
NRBC BLD-RTO: 0 /100 WBC
PLATELET # BLD AUTO: 216 K/UL (ref 150–350)
PMV BLD AUTO: 10.8 FL (ref 9.2–12.9)
POC IONIZED CALCIUM: 1.13 MMOL/L (ref 1.06–1.42)
POC TCO2 (MEASURED): 28 MMOL/L (ref 23–29)
POTASSIUM BLD-SCNC: 4.4 MMOL/L (ref 3.5–5.1)
POTASSIUM SERPL-SCNC: 4 MMOL/L (ref 3.5–5.1)
RBC # BLD AUTO: 5.1 M/UL (ref 4.6–6.2)
SAMPLE: NORMAL
SARS-COV-2 RDRP RESP QL NAA+PROBE: NEGATIVE
SODIUM BLD-SCNC: 139 MMOL/L (ref 136–145)
SODIUM SERPL-SCNC: 139 MMOL/L (ref 136–145)
WBC # BLD AUTO: 10.69 K/UL (ref 3.9–12.7)

## 2020-12-18 PROCEDURE — 86140 C-REACTIVE PROTEIN: CPT

## 2020-12-18 PROCEDURE — 25000003 PHARM REV CODE 250: Performed by: NURSE PRACTITIONER

## 2020-12-18 PROCEDURE — 99285 PR EMERGENCY DEPT VISIT,LEVEL V: ICD-10-PCS | Mod: CS,,, | Performed by: EMERGENCY MEDICINE

## 2020-12-18 PROCEDURE — 99220 PR INITIAL OBSERVATION CARE,LEVL III: CPT | Mod: ,,, | Performed by: NURSE PRACTITIONER

## 2020-12-18 PROCEDURE — 25000003 PHARM REV CODE 250: Performed by: EMERGENCY MEDICINE

## 2020-12-18 PROCEDURE — 80048 BASIC METABOLIC PNL TOTAL CA: CPT

## 2020-12-18 PROCEDURE — 80047 BASIC METABLC PNL IONIZED CA: CPT

## 2020-12-18 PROCEDURE — U0002 COVID-19 LAB TEST NON-CDC: HCPCS | Performed by: EMERGENCY MEDICINE

## 2020-12-18 PROCEDURE — 99285 EMERGENCY DEPT VISIT HI MDM: CPT | Mod: CS,,, | Performed by: EMERGENCY MEDICINE

## 2020-12-18 PROCEDURE — G0378 HOSPITAL OBSERVATION PER HR: HCPCS

## 2020-12-18 PROCEDURE — 83735 ASSAY OF MAGNESIUM: CPT

## 2020-12-18 PROCEDURE — 85025 COMPLETE CBC W/AUTO DIFF WBC: CPT

## 2020-12-18 PROCEDURE — 99285 EMERGENCY DEPT VISIT HI MDM: CPT | Mod: 25

## 2020-12-18 PROCEDURE — 96365 THER/PROPH/DIAG IV INF INIT: CPT

## 2020-12-18 PROCEDURE — 83036 HEMOGLOBIN GLYCOSYLATED A1C: CPT

## 2020-12-18 PROCEDURE — 99220 PR INITIAL OBSERVATION CARE,LEVL III: ICD-10-PCS | Mod: ,,, | Performed by: NURSE PRACTITIONER

## 2020-12-18 PROCEDURE — 83880 ASSAY OF NATRIURETIC PEPTIDE: CPT

## 2020-12-18 PROCEDURE — 85652 RBC SED RATE AUTOMATED: CPT

## 2020-12-18 RX ORDER — LISINOPRIL 20 MG/1
20 TABLET ORAL NIGHTLY
Status: DISCONTINUED | OUTPATIENT
Start: 2020-12-18 | End: 2020-12-21 | Stop reason: HOSPADM

## 2020-12-18 RX ORDER — ASPIRIN 325 MG
325 TABLET, DELAYED RELEASE (ENTERIC COATED) ORAL NIGHTLY
Status: DISCONTINUED | OUTPATIENT
Start: 2020-12-18 | End: 2020-12-21 | Stop reason: HOSPADM

## 2020-12-18 RX ORDER — IPRATROPIUM BROMIDE AND ALBUTEROL SULFATE 2.5; .5 MG/3ML; MG/3ML
3 SOLUTION RESPIRATORY (INHALATION) EVERY 6 HOURS PRN
Status: DISCONTINUED | OUTPATIENT
Start: 2020-12-18 | End: 2020-12-21 | Stop reason: HOSPADM

## 2020-12-18 RX ORDER — ACETAMINOPHEN 325 MG/1
650 TABLET ORAL
Status: COMPLETED | OUTPATIENT
Start: 2020-12-18 | End: 2020-12-18

## 2020-12-18 RX ORDER — AMOXICILLIN 250 MG
1 CAPSULE ORAL 2 TIMES DAILY
Status: DISCONTINUED | OUTPATIENT
Start: 2020-12-18 | End: 2020-12-21 | Stop reason: HOSPADM

## 2020-12-18 RX ORDER — IBUPROFEN 200 MG
24 TABLET ORAL
Status: DISCONTINUED | OUTPATIENT
Start: 2020-12-18 | End: 2020-12-21 | Stop reason: HOSPADM

## 2020-12-18 RX ORDER — ONDANSETRON 2 MG/ML
4 INJECTION INTRAMUSCULAR; INTRAVENOUS EVERY 8 HOURS PRN
Status: DISCONTINUED | OUTPATIENT
Start: 2020-12-18 | End: 2020-12-21 | Stop reason: HOSPADM

## 2020-12-18 RX ORDER — PROMETHAZINE HYDROCHLORIDE 25 MG/1
25 TABLET ORAL EVERY 6 HOURS PRN
Status: DISCONTINUED | OUTPATIENT
Start: 2020-12-18 | End: 2020-12-21 | Stop reason: HOSPADM

## 2020-12-18 RX ORDER — METOPROLOL SUCCINATE 100 MG/1
100 TABLET, EXTENDED RELEASE ORAL NIGHTLY
Status: DISCONTINUED | OUTPATIENT
Start: 2020-12-18 | End: 2020-12-21 | Stop reason: HOSPADM

## 2020-12-18 RX ORDER — POTASSIUM CHLORIDE 20 MEQ/1
20 TABLET, EXTENDED RELEASE ORAL WEEKLY
Status: DISCONTINUED | OUTPATIENT
Start: 2020-12-19 | End: 2020-12-21 | Stop reason: HOSPADM

## 2020-12-18 RX ORDER — CLINDAMYCIN PHOSPHATE 600 MG/50ML
600 INJECTION, SOLUTION INTRAVENOUS
Status: COMPLETED | OUTPATIENT
Start: 2020-12-18 | End: 2020-12-18

## 2020-12-18 RX ORDER — GLUCAGON 1 MG
1 KIT INJECTION
Status: DISCONTINUED | OUTPATIENT
Start: 2020-12-18 | End: 2020-12-21 | Stop reason: HOSPADM

## 2020-12-18 RX ORDER — TALC
6 POWDER (GRAM) TOPICAL NIGHTLY PRN
Status: DISCONTINUED | OUTPATIENT
Start: 2020-12-18 | End: 2020-12-21 | Stop reason: HOSPADM

## 2020-12-18 RX ORDER — ATORVASTATIN CALCIUM 20 MG/1
80 TABLET, FILM COATED ORAL NIGHTLY
Status: DISCONTINUED | OUTPATIENT
Start: 2020-12-18 | End: 2020-12-21 | Stop reason: HOSPADM

## 2020-12-18 RX ORDER — FUROSEMIDE 40 MG/1
80 TABLET ORAL WEEKLY
Status: DISCONTINUED | OUTPATIENT
Start: 2020-12-19 | End: 2020-12-21 | Stop reason: HOSPADM

## 2020-12-18 RX ORDER — CLOPIDOGREL BISULFATE 75 MG/1
75 TABLET ORAL DAILY
Status: DISCONTINUED | OUTPATIENT
Start: 2020-12-18 | End: 2020-12-21 | Stop reason: HOSPADM

## 2020-12-18 RX ORDER — ACETAMINOPHEN 325 MG/1
650 TABLET ORAL EVERY 4 HOURS PRN
Status: DISCONTINUED | OUTPATIENT
Start: 2020-12-18 | End: 2020-12-21 | Stop reason: HOSPADM

## 2020-12-18 RX ORDER — SODIUM CHLORIDE 0.9 % (FLUSH) 0.9 %
10 SYRINGE (ML) INJECTION
Status: DISCONTINUED | OUTPATIENT
Start: 2020-12-18 | End: 2020-12-19

## 2020-12-18 RX ORDER — IBUPROFEN 200 MG
16 TABLET ORAL
Status: DISCONTINUED | OUTPATIENT
Start: 2020-12-18 | End: 2020-12-21 | Stop reason: HOSPADM

## 2020-12-18 RX ORDER — ACETAMINOPHEN 325 MG/1
650 TABLET ORAL EVERY 8 HOURS PRN
Status: DISCONTINUED | OUTPATIENT
Start: 2020-12-18 | End: 2020-12-21 | Stop reason: HOSPADM

## 2020-12-18 RX ORDER — ALLOPURINOL 100 MG/1
100 TABLET ORAL 2 TIMES DAILY
Status: DISCONTINUED | OUTPATIENT
Start: 2020-12-18 | End: 2020-12-21 | Stop reason: HOSPADM

## 2020-12-18 RX ORDER — HYDROCODONE BITARTRATE AND ACETAMINOPHEN 10; 325 MG/1; MG/1
1 TABLET ORAL EVERY 6 HOURS PRN
Status: DISCONTINUED | OUTPATIENT
Start: 2020-12-18 | End: 2020-12-19

## 2020-12-18 RX ADMIN — ASPIRIN 325 MG: 325 TABLET, COATED ORAL at 10:12

## 2020-12-18 RX ADMIN — LISINOPRIL 20 MG: 20 TABLET ORAL at 10:12

## 2020-12-18 RX ADMIN — ALLOPURINOL 100 MG: 100 TABLET ORAL at 10:12

## 2020-12-18 RX ADMIN — AMIODARONE HYDROCHLORIDE 300 MG: 200 TABLET ORAL at 11:12

## 2020-12-18 RX ADMIN — CLINDAMYCIN IN 5 PERCENT DEXTROSE 600 MG: 12 INJECTION, SOLUTION INTRAVENOUS at 08:12

## 2020-12-18 RX ADMIN — ACETAMINOPHEN 650 MG: 325 TABLET ORAL at 06:12

## 2020-12-18 RX ADMIN — CLOPIDOGREL 75 MG: 75 TABLET, FILM COATED ORAL at 10:12

## 2020-12-18 RX ADMIN — ATORVASTATIN CALCIUM 80 MG: 20 TABLET, FILM COATED ORAL at 10:12

## 2020-12-18 RX ADMIN — METOPROLOL SUCCINATE 100 MG: 100 TABLET, EXTENDED RELEASE ORAL at 10:12

## 2020-12-19 ENCOUNTER — ANESTHESIA (OUTPATIENT)
Dept: SURGERY | Facility: HOSPITAL | Age: 70
End: 2020-12-19
Payer: MEDICARE

## 2020-12-19 ENCOUNTER — ANESTHESIA EVENT (OUTPATIENT)
Dept: SURGERY | Facility: HOSPITAL | Age: 70
End: 2020-12-19
Payer: MEDICARE

## 2020-12-19 LAB
ALBUMIN SERPL BCP-MCNC: 3.5 G/DL (ref 3.5–5.2)
ALP SERPL-CCNC: 73 U/L (ref 55–135)
ALT SERPL W/O P-5'-P-CCNC: 15 U/L (ref 10–44)
ANION GAP SERPL CALC-SCNC: 9 MMOL/L (ref 8–16)
AST SERPL-CCNC: 16 U/L (ref 10–40)
BASOPHILS # BLD AUTO: 0.05 K/UL (ref 0–0.2)
BASOPHILS NFR BLD: 0.7 % (ref 0–1.9)
BILIRUB SERPL-MCNC: 1 MG/DL (ref 0.1–1)
BUN SERPL-MCNC: 16 MG/DL (ref 8–23)
CALCIUM SERPL-MCNC: 8.7 MG/DL (ref 8.7–10.5)
CHLORIDE SERPL-SCNC: 107 MMOL/L (ref 95–110)
CO2 SERPL-SCNC: 22 MMOL/L (ref 23–29)
CREAT SERPL-MCNC: 1.1 MG/DL (ref 0.5–1.4)
DIFFERENTIAL METHOD: ABNORMAL
EOSINOPHIL # BLD AUTO: 0.1 K/UL (ref 0–0.5)
EOSINOPHIL NFR BLD: 1.4 % (ref 0–8)
ERYTHROCYTE [DISTWIDTH] IN BLOOD BY AUTOMATED COUNT: 13.1 % (ref 11.5–14.5)
EST. GFR  (AFRICAN AMERICAN): >60 ML/MIN/1.73 M^2
EST. GFR  (NON AFRICAN AMERICAN): >60 ML/MIN/1.73 M^2
ESTIMATED AVG GLUCOSE: 114 MG/DL (ref 68–131)
GLUCOSE SERPL-MCNC: 111 MG/DL (ref 70–110)
GRAM STN SPEC: NORMAL
HBA1C MFR BLD HPLC: 5.6 % (ref 4–5.6)
HCT VFR BLD AUTO: 45 % (ref 40–54)
HGB BLD-MCNC: 14.3 G/DL (ref 14–18)
IMM GRANULOCYTES # BLD AUTO: 0.04 K/UL (ref 0–0.04)
IMM GRANULOCYTES NFR BLD AUTO: 0.6 % (ref 0–0.5)
LYMPHOCYTES # BLD AUTO: 1.6 K/UL (ref 1–4.8)
LYMPHOCYTES NFR BLD: 23.4 % (ref 18–48)
MAGNESIUM SERPL-MCNC: 1.8 MG/DL (ref 1.6–2.6)
MCH RBC QN AUTO: 31.1 PG (ref 27–31)
MCHC RBC AUTO-ENTMCNC: 31.8 G/DL (ref 32–36)
MCV RBC AUTO: 98 FL (ref 82–98)
MONOCYTES # BLD AUTO: 0.8 K/UL (ref 0.3–1)
MONOCYTES NFR BLD: 11.6 % (ref 4–15)
NEUTROPHILS # BLD AUTO: 4.4 K/UL (ref 1.8–7.7)
NEUTROPHILS NFR BLD: 62.3 % (ref 38–73)
NRBC BLD-RTO: 0 /100 WBC
PHOSPHATE SERPL-MCNC: 2.6 MG/DL (ref 2.7–4.5)
PLATELET # BLD AUTO: 172 K/UL (ref 150–350)
PMV BLD AUTO: 10.6 FL (ref 9.2–12.9)
POTASSIUM SERPL-SCNC: 4.1 MMOL/L (ref 3.5–5.1)
PROT SERPL-MCNC: 6.3 G/DL (ref 6–8.4)
RBC # BLD AUTO: 4.6 M/UL (ref 4.6–6.2)
SODIUM SERPL-SCNC: 138 MMOL/L (ref 136–145)
WBC # BLD AUTO: 7 K/UL (ref 3.9–12.7)

## 2020-12-19 PROCEDURE — 99223 PR INITIAL HOSPITAL CARE,LEVL III: ICD-10-PCS | Mod: 57,,, | Performed by: ORTHOPAEDIC SURGERY

## 2020-12-19 PROCEDURE — 26075: ICD-10-PCS | Mod: F1,,, | Performed by: ORTHOPAEDIC SURGERY

## 2020-12-19 PROCEDURE — 83036 HEMOGLOBIN GLYCOSYLATED A1C: CPT

## 2020-12-19 PROCEDURE — 63600175 PHARM REV CODE 636 W HCPCS: Performed by: STUDENT IN AN ORGANIZED HEALTH CARE EDUCATION/TRAINING PROGRAM

## 2020-12-19 PROCEDURE — 87075 CULTR BACTERIA EXCEPT BLOOD: CPT

## 2020-12-19 PROCEDURE — 36000705 HC OR TIME LEV I EA ADD 15 MIN: Performed by: ORTHOPAEDIC SURGERY

## 2020-12-19 PROCEDURE — 25000003 PHARM REV CODE 250: Performed by: NURSE ANESTHETIST, CERTIFIED REGISTERED

## 2020-12-19 PROCEDURE — 25000003 PHARM REV CODE 250: Performed by: PHYSICIAN ASSISTANT

## 2020-12-19 PROCEDURE — 88305 TISSUE EXAM BY PATHOLOGIST: ICD-10-PCS | Mod: 26,,, | Performed by: PATHOLOGY

## 2020-12-19 PROCEDURE — 96374 THER/PROPH/DIAG INJ IV PUSH: CPT | Mod: 59

## 2020-12-19 PROCEDURE — 85025 COMPLETE CBC W/AUTO DIFF WBC: CPT

## 2020-12-19 PROCEDURE — D9220A PRA ANESTHESIA: Mod: ANES,,, | Performed by: ANESTHESIOLOGY

## 2020-12-19 PROCEDURE — 99226 PR SUBSEQUENT OBSERVATION CARE,LEVEL III: CPT | Mod: ,,, | Performed by: PHYSICIAN ASSISTANT

## 2020-12-19 PROCEDURE — D9220A PRA ANESTHESIA: ICD-10-PCS | Mod: ANES,,, | Performed by: ANESTHESIOLOGY

## 2020-12-19 PROCEDURE — 37000008 HC ANESTHESIA 1ST 15 MINUTES: Performed by: ORTHOPAEDIC SURGERY

## 2020-12-19 PROCEDURE — 64415 NJX AA&/STRD BRCH PLXS IMG: CPT | Mod: 59,LT,, | Performed by: STUDENT IN AN ORGANIZED HEALTH CARE EDUCATION/TRAINING PROGRAM

## 2020-12-19 PROCEDURE — 63600175 PHARM REV CODE 636 W HCPCS: Performed by: NURSE ANESTHETIST, CERTIFIED REGISTERED

## 2020-12-19 PROCEDURE — G0378 HOSPITAL OBSERVATION PER HR: HCPCS

## 2020-12-19 PROCEDURE — 64415 SUPRACLAVICULAR SINGLE SHOT: ICD-10-PCS | Mod: 59,LT,, | Performed by: STUDENT IN AN ORGANIZED HEALTH CARE EDUCATION/TRAINING PROGRAM

## 2020-12-19 PROCEDURE — 87102 FUNGUS ISOLATION CULTURE: CPT

## 2020-12-19 PROCEDURE — 76942 ECHO GUIDE FOR BIOPSY: CPT | Performed by: STUDENT IN AN ORGANIZED HEALTH CARE EDUCATION/TRAINING PROGRAM

## 2020-12-19 PROCEDURE — A4216 STERILE WATER/SALINE, 10 ML: HCPCS | Performed by: NURSE ANESTHETIST, CERTIFIED REGISTERED

## 2020-12-19 PROCEDURE — 26075 EXPLORE/TREAT FINGER JOINT: CPT | Mod: F1,,, | Performed by: ORTHOPAEDIC SURGERY

## 2020-12-19 PROCEDURE — 25000003 PHARM REV CODE 250: Performed by: ORTHOPAEDIC SURGERY

## 2020-12-19 PROCEDURE — 88305 TISSUE EXAM BY PATHOLOGIST: CPT | Performed by: PATHOLOGY

## 2020-12-19 PROCEDURE — 99223 1ST HOSP IP/OBS HIGH 75: CPT | Mod: 57,,, | Performed by: ORTHOPAEDIC SURGERY

## 2020-12-19 PROCEDURE — 36000704 HC OR TIME LEV I 1ST 15 MIN: Performed by: ORTHOPAEDIC SURGERY

## 2020-12-19 PROCEDURE — 88305 TISSUE EXAM BY PATHOLOGIST: CPT | Mod: 26,,, | Performed by: PATHOLOGY

## 2020-12-19 PROCEDURE — 71000033 HC RECOVERY, INTIAL HOUR: Performed by: ORTHOPAEDIC SURGERY

## 2020-12-19 PROCEDURE — 25000003 PHARM REV CODE 250: Performed by: NURSE PRACTITIONER

## 2020-12-19 PROCEDURE — 83735 ASSAY OF MAGNESIUM: CPT

## 2020-12-19 PROCEDURE — 87116 MYCOBACTERIA CULTURE: CPT

## 2020-12-19 PROCEDURE — 87206 SMEAR FLUORESCENT/ACID STAI: CPT | Mod: 91

## 2020-12-19 PROCEDURE — 84100 ASSAY OF PHOSPHORUS: CPT

## 2020-12-19 PROCEDURE — 80053 COMPREHEN METABOLIC PANEL: CPT

## 2020-12-19 PROCEDURE — 87205 SMEAR GRAM STAIN: CPT | Mod: 59

## 2020-12-19 PROCEDURE — 27200750 HC INSULATED NEEDLE/ STIMUPLEX: Performed by: STUDENT IN AN ORGANIZED HEALTH CARE EDUCATION/TRAINING PROGRAM

## 2020-12-19 PROCEDURE — 76942 SUPRACLAVICULAR SINGLE SHOT: ICD-10-PCS | Mod: 26,,, | Performed by: STUDENT IN AN ORGANIZED HEALTH CARE EDUCATION/TRAINING PROGRAM

## 2020-12-19 PROCEDURE — D9220A PRA ANESTHESIA: ICD-10-PCS | Mod: CRNA,,, | Performed by: NURSE ANESTHETIST, CERTIFIED REGISTERED

## 2020-12-19 PROCEDURE — 37000009 HC ANESTHESIA EA ADD 15 MINS: Performed by: ORTHOPAEDIC SURGERY

## 2020-12-19 PROCEDURE — 27201423 OPTIME MED/SURG SUP & DEVICES STERILE SUPPLY: Performed by: ORTHOPAEDIC SURGERY

## 2020-12-19 PROCEDURE — 87015 SPECIMEN INFECT AGNT CONCNTJ: CPT

## 2020-12-19 PROCEDURE — 63600175 PHARM REV CODE 636 W HCPCS: Performed by: PHYSICIAN ASSISTANT

## 2020-12-19 PROCEDURE — 36415 COLL VENOUS BLD VENIPUNCTURE: CPT

## 2020-12-19 PROCEDURE — 99226 PR SUBSEQUENT OBSERVATION CARE,LEVEL III: ICD-10-PCS | Mod: ,,, | Performed by: PHYSICIAN ASSISTANT

## 2020-12-19 PROCEDURE — D9220A PRA ANESTHESIA: Mod: CRNA,,, | Performed by: NURSE ANESTHETIST, CERTIFIED REGISTERED

## 2020-12-19 PROCEDURE — 87070 CULTURE OTHR SPECIMN AEROBIC: CPT | Mod: 59

## 2020-12-19 PROCEDURE — 96376 TX/PRO/DX INJ SAME DRUG ADON: CPT

## 2020-12-19 RX ORDER — BACITRACIN ZINC 500 UNIT/G
OINTMENT (GRAM) TOPICAL
Status: DISCONTINUED | OUTPATIENT
Start: 2020-12-19 | End: 2020-12-19 | Stop reason: HOSPADM

## 2020-12-19 RX ORDER — ONDANSETRON 2 MG/ML
INJECTION INTRAMUSCULAR; INTRAVENOUS
Status: DISCONTINUED | OUTPATIENT
Start: 2020-12-19 | End: 2020-12-19

## 2020-12-19 RX ORDER — LIDOCAINE HYDROCHLORIDE 20 MG/ML
INJECTION, SOLUTION EPIDURAL; INFILTRATION; INTRACAUDAL; PERINEURAL
Status: DISCONTINUED | OUTPATIENT
Start: 2020-12-19 | End: 2020-12-19

## 2020-12-19 RX ORDER — MIDAZOLAM HYDROCHLORIDE 1 MG/ML
INJECTION, SOLUTION INTRAMUSCULAR; INTRAVENOUS
Status: DISCONTINUED | OUTPATIENT
Start: 2020-12-19 | End: 2020-12-19

## 2020-12-19 RX ORDER — FENTANYL CITRATE 50 UG/ML
25 INJECTION, SOLUTION INTRAMUSCULAR; INTRAVENOUS EVERY 5 MIN PRN
Status: DISCONTINUED | OUTPATIENT
Start: 2020-12-19 | End: 2020-12-19 | Stop reason: HOSPADM

## 2020-12-19 RX ORDER — MORPHINE SULFATE 2 MG/ML
2 INJECTION, SOLUTION INTRAMUSCULAR; INTRAVENOUS EVERY 6 HOURS PRN
Status: DISCONTINUED | OUTPATIENT
Start: 2020-12-19 | End: 2020-12-21 | Stop reason: HOSPADM

## 2020-12-19 RX ORDER — DEXMEDETOMIDINE HYDROCHLORIDE 100 UG/ML
INJECTION, SOLUTION INTRAVENOUS
Status: DISCONTINUED | OUTPATIENT
Start: 2020-12-19 | End: 2020-12-19

## 2020-12-19 RX ORDER — ROPIVACAINE HYDROCHLORIDE 5 MG/ML
INJECTION, SOLUTION EPIDURAL; INFILTRATION; PERINEURAL
Status: COMPLETED | OUTPATIENT
Start: 2020-12-19 | End: 2020-12-19

## 2020-12-19 RX ORDER — CLINDAMYCIN PHOSPHATE 600 MG/50ML
600 INJECTION, SOLUTION INTRAVENOUS
Status: DISCONTINUED | OUTPATIENT
Start: 2020-12-19 | End: 2020-12-21 | Stop reason: HOSPADM

## 2020-12-19 RX ORDER — CLINDAMYCIN PHOSPHATE 600 MG/50ML
600 INJECTION, SOLUTION INTRAVENOUS
Status: DISCONTINUED | OUTPATIENT
Start: 2020-12-19 | End: 2020-12-19

## 2020-12-19 RX ORDER — MIDAZOLAM HYDROCHLORIDE 1 MG/ML
0.5 INJECTION INTRAMUSCULAR; INTRAVENOUS
Status: DISCONTINUED | OUTPATIENT
Start: 2020-12-19 | End: 2020-12-19 | Stop reason: HOSPADM

## 2020-12-19 RX ORDER — HYDROMORPHONE HYDROCHLORIDE 1 MG/ML
0.2 INJECTION, SOLUTION INTRAMUSCULAR; INTRAVENOUS; SUBCUTANEOUS EVERY 5 MIN PRN
Status: DISCONTINUED | OUTPATIENT
Start: 2020-12-19 | End: 2020-12-19 | Stop reason: HOSPADM

## 2020-12-19 RX ORDER — ONDANSETRON 2 MG/ML
4 INJECTION INTRAMUSCULAR; INTRAVENOUS ONCE AS NEEDED
Status: DISCONTINUED | OUTPATIENT
Start: 2020-12-19 | End: 2020-12-19 | Stop reason: HOSPADM

## 2020-12-19 RX ORDER — PROPOFOL 10 MG/ML
VIAL (ML) INTRAVENOUS
Status: DISCONTINUED | OUTPATIENT
Start: 2020-12-19 | End: 2020-12-19

## 2020-12-19 RX ORDER — SODIUM CHLORIDE 0.9 % (FLUSH) 0.9 %
10 SYRINGE (ML) INJECTION
Status: DISCONTINUED | OUTPATIENT
Start: 2020-12-19 | End: 2020-12-21 | Stop reason: HOSPADM

## 2020-12-19 RX ORDER — KETAMINE HCL IN 0.9 % NACL 50 MG/5 ML
SYRINGE (ML) INTRAVENOUS
Status: DISCONTINUED | OUTPATIENT
Start: 2020-12-19 | End: 2020-12-19

## 2020-12-19 RX ORDER — HYDROCODONE BITARTRATE AND ACETAMINOPHEN 10; 325 MG/1; MG/1
1 TABLET ORAL EVERY 4 HOURS PRN
Status: DISCONTINUED | OUTPATIENT
Start: 2020-12-19 | End: 2020-12-21 | Stop reason: HOSPADM

## 2020-12-19 RX ORDER — MUPIROCIN 20 MG/G
OINTMENT TOPICAL
Status: DISCONTINUED | OUTPATIENT
Start: 2020-12-19 | End: 2020-12-19 | Stop reason: HOSPADM

## 2020-12-19 RX ADMIN — FUROSEMIDE 80 MG: 40 TABLET ORAL at 03:12

## 2020-12-19 RX ADMIN — ACETAMINOPHEN 650 MG: 325 TABLET ORAL at 04:12

## 2020-12-19 RX ADMIN — METOPROLOL SUCCINATE 100 MG: 100 TABLET, EXTENDED RELEASE ORAL at 08:12

## 2020-12-19 RX ADMIN — LISINOPRIL 20 MG: 20 TABLET ORAL at 08:12

## 2020-12-19 RX ADMIN — FENTANYL CITRATE 50 MCG: 50 INJECTION INTRAMUSCULAR; INTRAVENOUS at 10:12

## 2020-12-19 RX ADMIN — MIDAZOLAM 2 MG: 1 INJECTION INTRAMUSCULAR; INTRAVENOUS at 10:12

## 2020-12-19 RX ADMIN — PROPOFOL 20 MG: 10 INJECTION, EMULSION INTRAVENOUS at 01:12

## 2020-12-19 RX ADMIN — ATORVASTATIN CALCIUM 80 MG: 20 TABLET, FILM COATED ORAL at 08:12

## 2020-12-19 RX ADMIN — MEPIVACAINE HYDROCHLORIDE 25 ML: 15 INJECTION, SOLUTION EPIDURAL; INFILTRATION at 10:12

## 2020-12-19 RX ADMIN — MIDAZOLAM 1 MG: 1 INJECTION INTRAMUSCULAR; INTRAVENOUS at 12:12

## 2020-12-19 RX ADMIN — Medication 10 MG: at 12:12

## 2020-12-19 RX ADMIN — HYDROCODONE BITARTRATE AND ACETAMINOPHEN 1 TABLET: 10; 325 TABLET ORAL at 10:12

## 2020-12-19 RX ADMIN — ALLOPURINOL 100 MG: 100 TABLET ORAL at 08:12

## 2020-12-19 RX ADMIN — HYDROCODONE BITARTRATE AND ACETAMINOPHEN 1 TABLET: 10; 325 TABLET ORAL at 06:12

## 2020-12-19 RX ADMIN — SODIUM CHLORIDE 0.5 MCG/KG/HR: 9 INJECTION, SOLUTION INTRAMUSCULAR; INTRAVENOUS; SUBCUTANEOUS at 11:12

## 2020-12-19 RX ADMIN — ROPIVACAINE HYDROCHLORIDE 5 ML: 5 INJECTION, SOLUTION EPIDURAL; INFILTRATION; PERINEURAL at 10:12

## 2020-12-19 RX ADMIN — LIDOCAINE HYDROCHLORIDE 40 MG: 20 INJECTION, SOLUTION EPIDURAL; INFILTRATION; INTRACAUDAL at 11:12

## 2020-12-19 RX ADMIN — HYDROCODONE BITARTRATE AND ACETAMINOPHEN 1 TABLET: 10; 325 TABLET ORAL at 03:12

## 2020-12-19 RX ADMIN — DOCUSATE SODIUM 50MG AND SENNOSIDES 8.6MG 1 TABLET: 8.6; 5 TABLET, FILM COATED ORAL at 08:12

## 2020-12-19 RX ADMIN — SODIUM CHLORIDE: 0.9 INJECTION, SOLUTION INTRAVENOUS at 11:12

## 2020-12-19 RX ADMIN — ASPIRIN 325 MG: 325 TABLET, COATED ORAL at 08:12

## 2020-12-19 RX ADMIN — PROPOFOL 20 MG: 10 INJECTION, EMULSION INTRAVENOUS at 12:12

## 2020-12-19 RX ADMIN — DOCUSATE SODIUM 50MG AND SENNOSIDES 8.6MG 1 TABLET: 8.6; 5 TABLET, FILM COATED ORAL at 03:12

## 2020-12-19 RX ADMIN — DEXMEDETOMIDINE HYDROCHLORIDE 8 MCG: 100 INJECTION, SOLUTION INTRAVENOUS at 11:12

## 2020-12-19 RX ADMIN — ALLOPURINOL 100 MG: 100 TABLET ORAL at 03:12

## 2020-12-19 RX ADMIN — CLINDAMYCIN IN 5 PERCENT DEXTROSE 600 MG: 12 INJECTION, SOLUTION INTRAVENOUS at 03:12

## 2020-12-19 RX ADMIN — CLINDAMYCIN IN 5 PERCENT DEXTROSE 600 MG: 12 INJECTION, SOLUTION INTRAVENOUS at 06:12

## 2020-12-19 RX ADMIN — CLOPIDOGREL 75 MG: 75 TABLET, FILM COATED ORAL at 03:12

## 2020-12-19 RX ADMIN — AMIODARONE HYDROCHLORIDE 300 MG: 200 TABLET ORAL at 08:12

## 2020-12-19 RX ADMIN — CLINDAMYCIN IN 5 PERCENT DEXTROSE 600 MG: 12 INJECTION, SOLUTION INTRAVENOUS at 12:12

## 2020-12-19 RX ADMIN — ACETAMINOPHEN 650 MG: 325 TABLET ORAL at 10:12

## 2020-12-19 RX ADMIN — ONDANSETRON 4 MG: 2 INJECTION, SOLUTION INTRAMUSCULAR; INTRAVENOUS at 11:12

## 2020-12-19 RX ADMIN — POTASSIUM CHLORIDE 20 MEQ: 1500 TABLET, EXTENDED RELEASE ORAL at 03:12

## 2020-12-19 RX ADMIN — MORPHINE SULFATE 2 MG: 2 INJECTION, SOLUTION INTRAMUSCULAR; INTRAVENOUS at 05:12

## 2020-12-19 NOTE — ANESTHESIA POSTPROCEDURE EVALUATION
Anesthesia Post Evaluation    Patient: Audrey Oneil JrFly    Procedure(s) Performed: Procedure(s) (LRB):  INCISION AND DRAINAGE, UPPER EXTREMITY, left hand (Left)    Final Anesthesia Type: general      Patient location during evaluation: PACU  Patient participation: Yes- Able to Participate  Level of consciousness: awake  Post-procedure vital signs: reviewed and stable  Pain management: adequate  Airway patency: patent    PONV status at discharge: No PONV  Anesthetic complications: no      Cardiovascular status: blood pressure returned to baseline  Respiratory status: unassisted  Hydration status: euvolemic  Follow-up not needed.          Vitals Value Taken Time   /66 12/19/20 1539   Temp 36.8 °C (98.2 °F) 12/19/20 1539   Pulse 71 12/19/20 1539   Resp 16 12/19/20 1539   SpO2 96 % 12/19/20 1539         Event Time   Out of Recovery 12/19/2020 14:15:00         Pain/Magaly Score: Pain Rating Prior to Med Admin: 4 (12/19/2020  3:30 PM)  Pain Rating Post Med Admin: 0 (12/19/2020  8:15 AM)  Magaly Score: 9 (12/19/2020  2:00 PM)

## 2020-12-19 NOTE — ANESTHESIA PREPROCEDURE EVALUATION
[facility]  Anesthesia Pre-Operative Evaluation         Patient Name: Audrey Oneil Jr.  YOB: 1950  MRN: [id number]    SUBJECTIVE:     Pre-operative evaluation for Procedure(s) (LRB):  INCISION AND DRAINAGE, UPPER EXTREMITY, left hand, hand table, cysto tubing, 3L saline bag (Left)     12/19/2020    Audrey Oneil Jr. is a 71 y/o M with PMH CAD/STEMI pLad '07, ICM/ SHF EF 15-20%, s/p St Rodri ICD/ VT, Provoked PE/ DVT 2010 (no longer on Warfarin), HTN, HLD, Former Smoker, presents with worsening L hand cellulitis.         Patient now presents for the above procedure(s).      LDA:        Peripheral IV - Single Lumen 12/18/20 1808 20 G Right Antecubital (Active)   Site Assessment Clean;Dry;Intact;No redness;No swelling 12/19/20 0600   Line Status Saline locked 12/19/20 0600   Dressing Status Clean;Dry;Intact 12/19/20 0600   Dressing Intervention First dressing 12/18/20 1808   Number of days: 0       Prev airway: None documented.    Drips: None documented.      Patient Active Problem List   Diagnosis    Coronary artery disease involving native coronary artery of native heart without angina pectoris    Chronic combined systolic and diastolic congestive heart failure    Severe obesity (BMI 35.0-39.9) with comorbidity    Cardiomyopathy, ischemic    Hx pulmonary embolism 2010 provoked dvt     History of DVT (deep vein thrombosis)    Hypertensive cardiovascular-renal disease, stage 1-4 or unspecified chronic kidney disease, with heart failure    Presence of cardiac resynchronization therapy defibrillator (CRT-D)    Hyperlipidemia LDL goal <70    Atherosclerosis of aorta    Prediabetes    Neck pain, bilateral posterior    Dysphonia    Trigger thumb of right hand    Right carpal tunnel syndrome    LBBB (left bundle branch block)    Chronic gout    Localized swelling of right thumb    Swelling of right elbow    Cellulitis of left hand       Review of patient's allergies  indicates:   Allergen Reactions    Iodine containing multivitamin Swelling     itching    Keflex [cephalexin] Swelling     Eyes.  Tolerated multiple doses of zosyn and 1 dose of augmentin in 2015 and 2016, respectively    Peaches [peach (prunus persica)] Swelling     eyes    Shellfish containing products Swelling    Fig tree Swelling     itching    Tuberculin spenser test ppd Rash       Current Inpatient Medications:   allopurinoL  100 mg Oral BID    amiodarone  300 mg Oral QHS    aspirin  325 mg Oral QHS    atorvastatin  80 mg Oral QHS    clopidogreL  75 mg Oral Daily    furosemide  80 mg Oral Weekly    lisinopriL  20 mg Oral QHS    metoprolol succinate  100 mg Oral QHS    potassium chloride SA  20 mEq Oral Weekly    senna-docusate 8.6-50 mg  1 tablet Oral BID       Current Facility-Administered Medications on File Prior to Encounter   Medication Dose Route Frequency Provider Last Rate Last Dose    0.9%  NaCl infusion   Intravenous Continuous Matilde French NP        vancomycin in dextrose 5 % 1 gram/250 mL IVPB 1,000 mg  1,000 mg Intravenous On Call Procedure Matilde French NP   500 mg at 05/03/19 1051     Current Outpatient Medications on File Prior to Encounter   Medication Sig Dispense Refill    allopurinoL (ZYLOPRIM) 100 MG tablet Take 1 tablet (100 mg total) by mouth 2 (two) times daily. 180 tablet 3    amiodarone (PACERONE) 200 MG Tab Take 1.5 tablets (300 mg total) by mouth once daily. (Patient taking differently: Take 300 mg by mouth every evening. ) 135 tablet 3    aspirin (ECOTRIN) 325 MG EC tablet Take 325 mg by mouth every evening.       atorvastatin (LIPITOR) 80 MG tablet Take 1 tablet (80 mg total) by mouth every evening. 90 tablet 3    clopidogreL (PLAVIX) 75 mg tablet TAKE 1 TABLET(75 MG) BY MOUTH EVERY DAY 90 tablet 0    HYDROcodone-acetaminophen (NORCO)  mg per tablet Take 1 tablet by mouth every 6 (six) hours as needed for Pain.      lisinopriL  (PRINIVIL,ZESTRIL) 20 MG tablet Take 1 tablet (20 mg total) by mouth once daily. (Patient taking differently: Take 20 mg by mouth every evening. ) 30 tablet 5    metoprolol succinate (TOPROL-XL) 100 MG 24 hr tablet Take 1 tablet (100 mg total) by mouth once daily. (Patient taking differently: Take 100 mg by mouth every evening. ) 90 tablet 1    sulfamethoxazole-trimethoprim 800-160mg (BACTRIM DS) 800-160 mg Tab Take 1 tablet by mouth 2 (two) times daily. for 7 days 14 tablet 0    albuterol (PROVENTIL/VENTOLIN HFA) 90 mcg/actuation inhaler Inhale 2 puffs into the lungs every 6 (six) hours as needed for Wheezing. Rescue 6.7 g 0    colchicine (COLCRYS) 0.6 mg tablet Take 1 tablet (0.6 mg total) by mouth once daily. 20 tablet 0    furosemide (LASIX) 40 MG tablet Take 2 tablets 80 mg once per week 180 tablet 3    potassium chloride SA (K-DUR,KLOR-CON) 20 MEQ tablet TAKE 1 TABLET(20 MEQ) BY MOUTH EVERY DAY (Patient taking differently: Take 20 mEq by mouth once a week. TAKE 1 TABLET(20 MEQ) BY MOUTH weekly with lasix) 30 tablet 3       Past Surgical History:   Procedure Laterality Date    APPENDECTOMY      BACK SURGERY      CARDIAC DEFIBRILLATOR PLACEMENT      CARDIAC SURGERY  2007    stent    CARPAL TUNNEL RELEASE Right 04/2018    INSERTION OF BIVENTRICULAR IMPLANTABLE CARDIOVERTER-DEFIBRILLATOR (ICD) N/A 5/3/2019    Procedure: INSERTION, ICD, BIVENTRICULAR;  Surgeon: Teofilo Pal MD;  Location: [facility] EP LAB;  Service: Cardiology;  Laterality: N/A;  ICH CM,  ICD UPGD BI-V,  SJM, MAC, FAS, 3PREP (dual ICD INSITU)    r knee scope      REVISION OF SKIN POCKET FOR CARDIOVERTER-DEFIBRILLATOR Left 5/3/2019    Procedure: Revision, Skin Pocket, For Cardioverter-Defibrillator;  Surgeon: Teofilo Pal MD;  Location: [facility] EP LAB;  Service: Cardiology;  Laterality: Left;    SPINE SURGERY      TONSILLECTOMY      TRIGGER FINGER RELEASE Right 04/2018    thumb       Social History     Socioeconomic History     Marital status:      Spouse name: Not on file    Number of children: 2    Years of education: Not on file    Highest education level: Not on file   Occupational History    Occupation: [employer]     Comment: unemployed   Social Needs    Financial resource strain: Not on file    Food insecurity     Worry: Not on file     Inability: Not on file    Transportation needs     Medical: Not on file     Non-medical: Not on file   Tobacco Use    Smoking status: Former Smoker     Packs/day: 1.00     Years: 45.00     Pack years: 45.00     Types: Cigarettes     Quit date: 12/14/2005     Years since quitting: 15.0    Smokeless tobacco: Never Used    Tobacco comment: 1-1.5 ppd every day.   Substance and Sexual Activity    Alcohol use: No     Frequency: Never    Drug use: No    Sexual activity: Never   Lifestyle    Physical activity     Days per week: Not on file     Minutes per session: Not on file    Stress: Not on file   Relationships    Social connections     Talks on phone: Not on file     Gets together: Not on file     Attends Orthodox service: Not on file     Active member of club or organization: Not on file     Attends meetings of clubs or organizations: Not on file     Relationship status: Not on file   Other Topics Concern    Not on file   Social History Narrative    Not on file       OBJECTIVE:     Vital Signs Range (Last 24H):  Temp:  [36.8 °C (98.2 °F)-37.7 °C (99.9 °F)]   Pulse:  [67-90]   Resp:  [15-20]   BP: (104-140)/(52-79)   SpO2:  [89 %-98 %]       Significant Labs:  Lab Results   Component Value Date    WBC 7.00 12/19/2020    HGB 14.3 12/19/2020    HCT 45.0 12/19/2020     12/19/2020    CHOL 137 12/04/2019    TRIG 127 12/04/2019    HDL 45 12/04/2019    ALT 15 12/19/2020    AST 16 12/19/2020     12/19/2020    K 4.1 12/19/2020     12/19/2020    CREATININE 1.1 12/19/2020    BUN 16 12/19/2020    CO2 22 (L) 12/19/2020    TSH 0.833 05/04/2018    PSA 2.0 12/04/2019     INR 0.9 04/25/2019    HGBA1C 5.6 12/19/2020       Diagnostic Studies: No relevant studies.    EKG:   Results for orders placed or performed during the hospital encounter of 03/09/20   EKG 12-lead    Collection Time: 03/09/20  1:21 AM    Narrative    Test Reason : R07.9,    Vent. Rate : 066 BPM     Atrial Rate : 066 BPM     P-R Int : 138 ms          QRS Dur : 166 ms      QT Int : 480 ms       P-R-T Axes : 035 250 -19 degrees     QTc Int : 503 ms    Biventricular pacemaker detected  Atrial-sensed ventricular-paced rhythm  Abnormal ECG  When compared with ECG of 30-OCT-2019 04:26,  No significant change was found  Confirmed by JENNIFER CHURCHILL MD ([id number]) on 3/10/2020 8:01:33 PM    Referred By: [email or web address]   SELF           Confirmed By:JENNIFER CHURCHILL MD       2D ECHO:  TTE:  Results for orders placed or performed during the hospital encounter of 06/15/20   Echo Color Flow Doppler? Yes   Result Value Ref Range    Ascending aorta 3.31 cm    STJ 3.05 cm    AV mean gradient 4 mmHg    Ao peak russel 1.43 m/s    Ao VTI 25.11 cm    IVRT 179.83 msec    IVS 0.53 (A) 0.6 - 1.1 cm    LA size 3.89 cm    Left Atrium Major Axis 5.50 cm    Left Atrium Minor Axis 5.61 cm    LVIDd 6.97 (A) 3.5 - 6.0 cm    LVIDs 6.50 (A) 2.1 - 4.0 cm    LVOT diameter 2.11 cm    LVOT peak VTI 12.80 cm    Posterior Wall 1.00 0.6 - 1.1 cm    MV Peak A Russel 0.91 m/s    E wave decelartion time 227.44 msec    MV Peak E Russel 0.44 m/s    PV Peak D Russel 0.36 m/s    PV Peak S Russel 0.28 m/s    RA Major Axis 4.00 cm    RA Width 3.21 cm    RVDD 2.51 cm    Sinus 3.13 cm    TAPSE 1.02 cm    TR Max Russel 1.80 m/s    TDI LATERAL 0.03 m/s    TDI SEPTAL 0.04 m/s    LA WIDTH 3.80 cm    MV stenosis pressure 1/2 time 65.96 ms    LV Diastolic Volume 252.73 mL    LV Systolic Volume 215.69 mL    RV S' 11.49 cm/s    LVOT peak russel 0.77 m/s    LV LATERAL E/E' RATIO 14.67 m/s    LV SEPTAL E/E' RATIO 11.00 m/s    FS 7 %    LA volume 69.79 cm3    LV mass 229.83 g    Left Ventricle Relative Wall  Thickness 0.29 cm    AV valve area 1.78 cm2    AV Velocity Ratio 0.54     AV index (prosthetic) 0.51     MV valve area p 1/2 method 3.34 cm2    E/A ratio 0.48     Mean e' 0.04 m/s    Pulm vein S/D ratio 0.78     LVOT area 3.5 cm2    LVOT stroke volume 44.73 cm3    AV peak gradient 8 mmHg    E/E' ratio 12.57 m/s    Triscuspid Valve Regurgitation Peak Gradient 13 mmHg    BSA 2.32 m2    LV Systolic Volume Index 97.0 mL/m2    LV Diastolic Volume Index 113.68 mL/m2    LA Volume Index 31.4 mL/m2    LV Mass Index 103 g/m2    Right Atrial Pressure (from IVC) 3 mmHg    TV rest pulmonary artery pressure 16 mmHg    Narrative    · Moderate left ventricular enlargement.  · Severely decreased left ventricular systolic function. The estimated   ejection fraction is 15-20%.  · Segmental wall motion abnormalities. Ventricular septum is thinned and   akinetic.  · Grade I (mild) left ventricular diastolic dysfunction consistent with   impaired relaxation.  · Normal right ventricular size. Mildly reduced right ventricular systolic   function.  · Mild mitral regurgitation.  · Normal central venous pressure (3 mmHg).  · The estimated PA systolic pressure is 16 mmHg.          HARRY:  No results found for this or any previous visit.    ASSESSMENT/PLAN:         Anesthesia Evaluation    I have reviewed the Patient Summary Reports.    I have reviewed the Nursing Notes.    I have reviewed the Medications.     Review of Systems  Anesthesia Hx:  No problems with previous Anesthesia  History of prior surgery of interest to airway management or planning: Denies Family Hx of Anesthesia complications.   Denies Personal Hx of Anesthesia complications.   Hematology/Oncology:         -- Denies Anemia:   Cardiovascular:   Exercise tolerance: poor Pacemaker Hypertension CAD  CABG/stent Dysrhythmias  CHF HENSLEY ECG has been reviewed.    Pulmonary:   Denies COPD.  Denies Asthma.  Denies Sleep Apnea.    Renal/:   Chronic Renal Disease    Hepatic/GI:   Denies  GERD. Denies Liver Disease.    Neurological:   Denies CVA. Denies Seizures.    Endocrine:   Denies Diabetes.           Anesthesia Plan  Type of Anesthesia, risks & benefits discussed:  Anesthesia Type:  general  Patient's Preference:   Intra-op Monitoring Plan: standard ASA monitors  Intra-op Monitoring Plan Comments:   Post Op Pain Control Plan: per primary service following discharge from PACU  Post Op Pain Control Plan Comments:   Induction:   IV  Beta Blocker:  Patient is on a Beta-Blocker and has received one dose within the past 24 hours (No further documentation required).       Informed Consent: Patient understands risks and agrees with Anesthesia plan.  Questions answered.   ASA Score: 4  emergent   Day of Surgery Review of History & Physical:    H&P update referred to the provider.         Ready For Surgery From Anesthesia Perspective.

## 2020-12-19 NOTE — TRANSFER OF CARE
"Anesthesia Transfer of Care Note    Patient: Audrey Oneil Jr.    Procedure(s) Performed: Procedure(s) (LRB):  INCISION AND DRAINAGE, UPPER EXTREMITY, left hand (Left)    Patient location: PACU    Anesthesia Type: general    Transport from OR: Transported from OR on 2-3 L/min O2 by NC with adequate spontaneous ventilation    Post pain: adequate analgesia    Post assessment: no apparent anesthetic complications    Post vital signs: stable    Level of consciousness: responds to stimulation and lethargic    Nausea/Vomiting: no nausea/vomiting    Complications: none    Transfer of care protocol was followed      Last vitals:   Visit Vitals  /71 (BP Location: Right leg, Patient Position: Lying)   Pulse 60   Temp 36.5 °C (97.7 °F) (Oral)   Resp 20   Ht 5' 7" (1.702 m)   Wt 108.9 kg (240 lb)   SpO2 (!) 93%   BMI 37.59 kg/m²     "

## 2020-12-19 NOTE — ANESTHESIA PROCEDURE NOTES
"Supraclavicular single shot    Patient location during procedure: pre-op   Block not for primary anesthetic.  Reason for block: at surgeon's request and post-op pain management   Post-op Pain Location: Left hand  Start time: 12/19/2020 10:36 AM  Timeout: 12/19/2020 10:35 AM   End time: 12/19/2020 10:47 AM    Staffing  Authorizing Provider: Tyler Nguyen MD  Performing Provider: Tyler Nguyen MD    Preanesthetic Checklist  Completed: patient identified, site marked, surgical consent, pre-op evaluation, timeout performed, IV checked, risks and benefits discussed and monitors and equipment checked  Peripheral Block  Patient position: supine  Prep: ChloraPrep  Patient monitoring: heart rate, cardiac monitor, continuous pulse ox, continuous capnometry and frequent blood pressure checks  Block type: supraclavicular  Laterality: left  Injection technique: single shot  Needle  Needle type: Stimuplex   Needle gauge: 22 G  Needle length: 2 in  Needle localization: anatomical landmarks and ultrasound guidance   -ultrasound image captured on disc.  Assessment  Injection assessment: negative aspiration, negative parasthesia and local visualized surrounding nerve  Paresthesia pain: none  Heart rate change: no  Slow fractionated injection: yes  Additional Notes  VSS.  [facility] RN monitoring vitals throughout procedure.  Patient tolerated procedure well. Technically difficult. Patient request block that "doesn't last too long" 1:300k epi added to local               "

## 2020-12-20 PROBLEM — M10.9 ACUTE GOUT OF LEFT HAND: Status: ACTIVE | Noted: 2020-12-20

## 2020-12-20 LAB
ALBUMIN SERPL BCP-MCNC: 3.3 G/DL (ref 3.5–5.2)
ALP SERPL-CCNC: 71 U/L (ref 55–135)
ALT SERPL W/O P-5'-P-CCNC: 12 U/L (ref 10–44)
ANION GAP SERPL CALC-SCNC: 12 MMOL/L (ref 8–16)
AST SERPL-CCNC: 15 U/L (ref 10–40)
BASOPHILS # BLD AUTO: 0.03 K/UL (ref 0–0.2)
BASOPHILS NFR BLD: 0.4 % (ref 0–1.9)
BILIRUB SERPL-MCNC: 0.7 MG/DL (ref 0.1–1)
BUN SERPL-MCNC: 22 MG/DL (ref 8–23)
CALCIUM SERPL-MCNC: 8.2 MG/DL (ref 8.7–10.5)
CHLORIDE SERPL-SCNC: 104 MMOL/L (ref 95–110)
CO2 SERPL-SCNC: 23 MMOL/L (ref 23–29)
CREAT SERPL-MCNC: 1.4 MG/DL (ref 0.5–1.4)
DIFFERENTIAL METHOD: ABNORMAL
EOSINOPHIL # BLD AUTO: 0.1 K/UL (ref 0–0.5)
EOSINOPHIL NFR BLD: 1 % (ref 0–8)
ERYTHROCYTE [DISTWIDTH] IN BLOOD BY AUTOMATED COUNT: 13.1 % (ref 11.5–14.5)
EST. GFR  (AFRICAN AMERICAN): 59.3 ML/MIN/1.73 M^2
EST. GFR  (NON AFRICAN AMERICAN): 51.3 ML/MIN/1.73 M^2
GLUCOSE SERPL-MCNC: 122 MG/DL (ref 70–110)
HCT VFR BLD AUTO: 43.2 % (ref 40–54)
HGB BLD-MCNC: 13.3 G/DL (ref 14–18)
IMM GRANULOCYTES # BLD AUTO: 0.03 K/UL (ref 0–0.04)
IMM GRANULOCYTES NFR BLD AUTO: 0.4 % (ref 0–0.5)
LYMPHOCYTES # BLD AUTO: 1.7 K/UL (ref 1–4.8)
LYMPHOCYTES NFR BLD: 22.6 % (ref 18–48)
MAGNESIUM SERPL-MCNC: 1.9 MG/DL (ref 1.6–2.6)
MCH RBC QN AUTO: 30.6 PG (ref 27–31)
MCHC RBC AUTO-ENTMCNC: 30.8 G/DL (ref 32–36)
MCV RBC AUTO: 99 FL (ref 82–98)
MONOCYTES # BLD AUTO: 1 K/UL (ref 0.3–1)
MONOCYTES NFR BLD: 13.2 % (ref 4–15)
NEUTROPHILS # BLD AUTO: 4.8 K/UL (ref 1.8–7.7)
NEUTROPHILS NFR BLD: 62.4 % (ref 38–73)
NRBC BLD-RTO: 0 /100 WBC
PHOSPHATE SERPL-MCNC: 3.2 MG/DL (ref 2.7–4.5)
PLATELET # BLD AUTO: 171 K/UL (ref 150–350)
PMV BLD AUTO: 11.4 FL (ref 9.2–12.9)
POTASSIUM SERPL-SCNC: 3.9 MMOL/L (ref 3.5–5.1)
PROT SERPL-MCNC: 6.2 G/DL (ref 6–8.4)
RBC # BLD AUTO: 4.35 M/UL (ref 4.6–6.2)
SODIUM SERPL-SCNC: 139 MMOL/L (ref 136–145)
URATE SERPL-MCNC: 6.9 MG/DL (ref 3.4–7)
WBC # BLD AUTO: 7.67 K/UL (ref 3.9–12.7)

## 2020-12-20 PROCEDURE — 84550 ASSAY OF BLOOD/URIC ACID: CPT

## 2020-12-20 PROCEDURE — 25000003 PHARM REV CODE 250: Performed by: NURSE PRACTITIONER

## 2020-12-20 PROCEDURE — 25000003 PHARM REV CODE 250: Performed by: PHYSICIAN ASSISTANT

## 2020-12-20 PROCEDURE — 84100 ASSAY OF PHOSPHORUS: CPT

## 2020-12-20 PROCEDURE — 99204 OFFICE O/P NEW MOD 45 MIN: CPT | Mod: GC,,, | Performed by: INTERNAL MEDICINE

## 2020-12-20 PROCEDURE — 99204 PR OFFICE/OUTPT VISIT, NEW, LEVL IV, 45-59 MIN: ICD-10-PCS | Mod: GC,,, | Performed by: INTERNAL MEDICINE

## 2020-12-20 PROCEDURE — 83735 ASSAY OF MAGNESIUM: CPT

## 2020-12-20 PROCEDURE — 63600175 PHARM REV CODE 636 W HCPCS: Performed by: PHYSICIAN ASSISTANT

## 2020-12-20 PROCEDURE — G0378 HOSPITAL OBSERVATION PER HR: HCPCS

## 2020-12-20 PROCEDURE — 85025 COMPLETE CBC W/AUTO DIFF WBC: CPT

## 2020-12-20 PROCEDURE — 36415 COLL VENOUS BLD VENIPUNCTURE: CPT

## 2020-12-20 PROCEDURE — 99226 PR SUBSEQUENT OBSERVATION CARE,LEVEL III: CPT | Mod: ,,, | Performed by: PHYSICIAN ASSISTANT

## 2020-12-20 PROCEDURE — 96376 TX/PRO/DX INJ SAME DRUG ADON: CPT

## 2020-12-20 PROCEDURE — 99226 PR SUBSEQUENT OBSERVATION CARE,LEVEL III: ICD-10-PCS | Mod: ,,, | Performed by: PHYSICIAN ASSISTANT

## 2020-12-20 PROCEDURE — 80053 COMPREHEN METABOLIC PANEL: CPT

## 2020-12-20 RX ORDER — COLCHICINE 0.6 MG/1
0.6 TABLET, FILM COATED ORAL DAILY
Status: DISCONTINUED | OUTPATIENT
Start: 2020-12-21 | End: 2020-12-21 | Stop reason: HOSPADM

## 2020-12-20 RX ORDER — COLCHICINE 0.6 MG/1
0.6 TABLET, FILM COATED ORAL 2 TIMES DAILY
Status: DISCONTINUED | OUTPATIENT
Start: 2020-12-20 | End: 2020-12-20

## 2020-12-20 RX ORDER — COLCHICINE 0.6 MG/1
1.2 TABLET, FILM COATED ORAL ONCE
Status: COMPLETED | OUTPATIENT
Start: 2020-12-20 | End: 2020-12-20

## 2020-12-20 RX ORDER — COLCHICINE 0.6 MG/1
0.6 TABLET, FILM COATED ORAL ONCE
Status: DISCONTINUED | OUTPATIENT
Start: 2020-12-20 | End: 2020-12-20

## 2020-12-20 RX ORDER — COLCHICINE 0.6 MG/1
0.6 TABLET, FILM COATED ORAL ONCE
Status: COMPLETED | OUTPATIENT
Start: 2020-12-20 | End: 2020-12-20

## 2020-12-20 RX ADMIN — COLCHICINE 0.6 MG: 0.6 TABLET, FILM COATED ORAL at 12:12

## 2020-12-20 RX ADMIN — DOCUSATE SODIUM 50MG AND SENNOSIDES 8.6MG 1 TABLET: 8.6; 5 TABLET, FILM COATED ORAL at 08:12

## 2020-12-20 RX ADMIN — HYDROCODONE BITARTRATE AND ACETAMINOPHEN 1 TABLET: 10; 325 TABLET ORAL at 08:12

## 2020-12-20 RX ADMIN — ALLOPURINOL 100 MG: 100 TABLET ORAL at 08:12

## 2020-12-20 RX ADMIN — DOCUSATE SODIUM 50MG AND SENNOSIDES 8.6MG 1 TABLET: 8.6; 5 TABLET, FILM COATED ORAL at 09:12

## 2020-12-20 RX ADMIN — CLOPIDOGREL 75 MG: 75 TABLET, FILM COATED ORAL at 08:12

## 2020-12-20 RX ADMIN — CLINDAMYCIN IN 5 PERCENT DEXTROSE 600 MG: 12 INJECTION, SOLUTION INTRAVENOUS at 02:12

## 2020-12-20 RX ADMIN — ACETAMINOPHEN 650 MG: 325 TABLET ORAL at 08:12

## 2020-12-20 RX ADMIN — METOPROLOL SUCCINATE 100 MG: 100 TABLET, EXTENDED RELEASE ORAL at 09:12

## 2020-12-20 RX ADMIN — LISINOPRIL 20 MG: 20 TABLET ORAL at 09:12

## 2020-12-20 RX ADMIN — ASPIRIN 325 MG: 325 TABLET, COATED ORAL at 09:12

## 2020-12-20 RX ADMIN — AMIODARONE HYDROCHLORIDE 300 MG: 200 TABLET ORAL at 09:12

## 2020-12-20 RX ADMIN — CLINDAMYCIN IN 5 PERCENT DEXTROSE 600 MG: 12 INJECTION, SOLUTION INTRAVENOUS at 06:12

## 2020-12-20 RX ADMIN — ATORVASTATIN CALCIUM 80 MG: 20 TABLET, FILM COATED ORAL at 09:12

## 2020-12-20 RX ADMIN — COLCHICINE 1.2 MG: 0.6 TABLET, FILM COATED ORAL at 12:12

## 2020-12-20 RX ADMIN — MORPHINE SULFATE 2 MG: 2 INJECTION, SOLUTION INTRAMUSCULAR; INTRAVENOUS at 03:12

## 2020-12-20 RX ADMIN — HYDROCODONE BITARTRATE AND ACETAMINOPHEN 1 TABLET: 10; 325 TABLET ORAL at 01:12

## 2020-12-20 RX ADMIN — CLINDAMYCIN IN 5 PERCENT DEXTROSE 600 MG: 12 INJECTION, SOLUTION INTRAVENOUS at 12:12

## 2020-12-20 RX ADMIN — HYDROCODONE BITARTRATE AND ACETAMINOPHEN 1 TABLET: 10; 325 TABLET ORAL at 06:12

## 2020-12-20 RX ADMIN — ALLOPURINOL 100 MG: 100 TABLET ORAL at 09:12

## 2020-12-21 ENCOUNTER — TELEPHONE (OUTPATIENT)
Dept: ORTHOPEDICS | Facility: CLINIC | Age: 70
End: 2020-12-21

## 2020-12-21 VITALS
HEART RATE: 60 BPM | RESPIRATION RATE: 16 BRPM | SYSTOLIC BLOOD PRESSURE: 137 MMHG | TEMPERATURE: 99 F | HEIGHT: 67 IN | BODY MASS INDEX: 36.29 KG/M2 | DIASTOLIC BLOOD PRESSURE: 66 MMHG | WEIGHT: 231.25 LBS | OXYGEN SATURATION: 93 %

## 2020-12-21 LAB
ALBUMIN SERPL BCP-MCNC: 3 G/DL (ref 3.5–5.2)
ALP SERPL-CCNC: 67 U/L (ref 55–135)
ALT SERPL W/O P-5'-P-CCNC: 12 U/L (ref 10–44)
ANION GAP SERPL CALC-SCNC: 10 MMOL/L (ref 8–16)
AST SERPL-CCNC: 15 U/L (ref 10–40)
BASOPHILS # BLD AUTO: 0.05 K/UL (ref 0–0.2)
BASOPHILS NFR BLD: 0.7 % (ref 0–1.9)
BILIRUB SERPL-MCNC: 0.6 MG/DL (ref 0.1–1)
BUN SERPL-MCNC: 21 MG/DL (ref 8–23)
CALCIUM SERPL-MCNC: 8.3 MG/DL (ref 8.7–10.5)
CHLORIDE SERPL-SCNC: 106 MMOL/L (ref 95–110)
CO2 SERPL-SCNC: 24 MMOL/L (ref 23–29)
CREAT SERPL-MCNC: 1 MG/DL (ref 0.5–1.4)
DIFFERENTIAL METHOD: ABNORMAL
EOSINOPHIL # BLD AUTO: 0.1 K/UL (ref 0–0.5)
EOSINOPHIL NFR BLD: 1.7 % (ref 0–8)
ERYTHROCYTE [DISTWIDTH] IN BLOOD BY AUTOMATED COUNT: 13 % (ref 11.5–14.5)
EST. GFR  (AFRICAN AMERICAN): >60 ML/MIN/1.73 M^2
EST. GFR  (NON AFRICAN AMERICAN): >60 ML/MIN/1.73 M^2
GLUCOSE SERPL-MCNC: 93 MG/DL (ref 70–110)
HCT VFR BLD AUTO: 43.4 % (ref 40–54)
HGB BLD-MCNC: 13.7 G/DL (ref 14–18)
IMM GRANULOCYTES # BLD AUTO: 0.03 K/UL (ref 0–0.04)
IMM GRANULOCYTES NFR BLD AUTO: 0.4 % (ref 0–0.5)
LYMPHOCYTES # BLD AUTO: 1.9 K/UL (ref 1–4.8)
LYMPHOCYTES NFR BLD: 25.2 % (ref 18–48)
MAGNESIUM SERPL-MCNC: 1.9 MG/DL (ref 1.6–2.6)
MCH RBC QN AUTO: 30.9 PG (ref 27–31)
MCHC RBC AUTO-ENTMCNC: 31.6 G/DL (ref 32–36)
MCV RBC AUTO: 98 FL (ref 82–98)
MONOCYTES # BLD AUTO: 0.9 K/UL (ref 0.3–1)
MONOCYTES NFR BLD: 12.6 % (ref 4–15)
NEUTROPHILS # BLD AUTO: 4.4 K/UL (ref 1.8–7.7)
NEUTROPHILS NFR BLD: 59.4 % (ref 38–73)
NRBC BLD-RTO: 0 /100 WBC
PHOSPHATE SERPL-MCNC: 2.4 MG/DL (ref 2.7–4.5)
PLATELET # BLD AUTO: 181 K/UL (ref 150–350)
PMV BLD AUTO: 11.3 FL (ref 9.2–12.9)
POTASSIUM SERPL-SCNC: 3.9 MMOL/L (ref 3.5–5.1)
PROT SERPL-MCNC: 6.2 G/DL (ref 6–8.4)
RBC # BLD AUTO: 4.43 M/UL (ref 4.6–6.2)
SODIUM SERPL-SCNC: 140 MMOL/L (ref 136–145)
WBC # BLD AUTO: 7.46 K/UL (ref 3.9–12.7)

## 2020-12-21 PROCEDURE — 80053 COMPREHEN METABOLIC PANEL: CPT

## 2020-12-21 PROCEDURE — 25000003 PHARM REV CODE 250: Performed by: PHYSICIAN ASSISTANT

## 2020-12-21 PROCEDURE — 25000003 PHARM REV CODE 250: Performed by: NURSE PRACTITIONER

## 2020-12-21 PROCEDURE — 84100 ASSAY OF PHOSPHORUS: CPT

## 2020-12-21 PROCEDURE — 99217 PR OBSERVATION CARE DISCHARGE: ICD-10-PCS | Mod: ,,, | Performed by: PHYSICIAN ASSISTANT

## 2020-12-21 PROCEDURE — G0378 HOSPITAL OBSERVATION PER HR: HCPCS

## 2020-12-21 PROCEDURE — 36415 COLL VENOUS BLD VENIPUNCTURE: CPT

## 2020-12-21 PROCEDURE — 99217 PR OBSERVATION CARE DISCHARGE: CPT | Mod: ,,, | Performed by: PHYSICIAN ASSISTANT

## 2020-12-21 PROCEDURE — 96376 TX/PRO/DX INJ SAME DRUG ADON: CPT

## 2020-12-21 PROCEDURE — 83735 ASSAY OF MAGNESIUM: CPT

## 2020-12-21 PROCEDURE — 85025 COMPLETE CBC W/AUTO DIFF WBC: CPT

## 2020-12-21 RX ORDER — COLCHICINE 0.6 MG/1
0.6 TABLET ORAL DAILY
Qty: 10 TABLET | Refills: 0 | Status: SHIPPED | OUTPATIENT
Start: 2020-12-21 | End: 2021-02-04 | Stop reason: SDUPTHER

## 2020-12-21 RX ORDER — CLINDAMYCIN HYDROCHLORIDE 150 MG/1
450 CAPSULE ORAL 3 TIMES DAILY
Qty: 63 CAPSULE | Refills: 0 | Status: SHIPPED | OUTPATIENT
Start: 2020-12-21 | End: 2020-12-29

## 2020-12-21 RX ORDER — PANTOPRAZOLE SODIUM 40 MG/1
40 TABLET, DELAYED RELEASE ORAL DAILY
Qty: 10 TABLET | Refills: 0 | Status: ON HOLD | OUTPATIENT
Start: 2020-12-21 | End: 2021-09-26 | Stop reason: HOSPADM

## 2020-12-21 RX ADMIN — COLCHICINE 0.6 MG: 0.6 TABLET, FILM COATED ORAL at 08:12

## 2020-12-21 RX ADMIN — HYDROCODONE BITARTRATE AND ACETAMINOPHEN 1 TABLET: 10; 325 TABLET ORAL at 08:12

## 2020-12-21 RX ADMIN — ALLOPURINOL 100 MG: 100 TABLET ORAL at 08:12

## 2020-12-21 RX ADMIN — CLINDAMYCIN IN 5 PERCENT DEXTROSE 600 MG: 12 INJECTION, SOLUTION INTRAVENOUS at 02:12

## 2020-12-21 RX ADMIN — DOCUSATE SODIUM 50MG AND SENNOSIDES 8.6MG 1 TABLET: 8.6; 5 TABLET, FILM COATED ORAL at 08:12

## 2020-12-21 RX ADMIN — CLINDAMYCIN IN 5 PERCENT DEXTROSE 600 MG: 12 INJECTION, SOLUTION INTRAVENOUS at 10:12

## 2020-12-21 RX ADMIN — CLOPIDOGREL 75 MG: 75 TABLET, FILM COATED ORAL at 08:12

## 2020-12-21 NOTE — TELEPHONE ENCOUNTER
Spoke to Riana and scheduled  wound check with Jessica on 12/28/20.----- Message from Angella Hannon sent at 12/21/2020  6:43 AM CST -----  Please schedule 1 week PO appt for L index finger I&D for gout 12/19/20 wound check. This was a patient Dr. Forbes operated on while on call this weekend.     ET  ----- Message -----  From: Kwesi Ramirez MD  Sent: 12/19/2020   3:31 PM CST  To: Benny OVERTON Staff    S/p L index finger I&D for gout 12/19/20.  Please make one week postop for wound check with a PA.  Thanks.

## 2020-12-22 LAB
BACTERIA SPEC AEROBE CULT: NO GROWTH
BACTERIA SPEC AEROBE CULT: NO GROWTH

## 2020-12-28 ENCOUNTER — OFFICE VISIT (OUTPATIENT)
Dept: ORTHOPEDICS | Facility: CLINIC | Age: 70
End: 2020-12-28
Payer: MEDICARE

## 2020-12-28 ENCOUNTER — DOCUMENTATION ONLY (OUTPATIENT)
Dept: ORTHOPEDICS | Facility: CLINIC | Age: 70
End: 2020-12-28

## 2020-12-28 VITALS
BODY MASS INDEX: 36.26 KG/M2 | SYSTOLIC BLOOD PRESSURE: 130 MMHG | HEART RATE: 62 BPM | DIASTOLIC BLOOD PRESSURE: 74 MMHG | WEIGHT: 231 LBS | HEIGHT: 67 IN

## 2020-12-28 DIAGNOSIS — Z98.890 POST-OPERATIVE STATE: Primary | ICD-10-CM

## 2020-12-28 LAB
BACTERIA SPEC ANAEROBE CULT: NORMAL
BACTERIA SPEC ANAEROBE CULT: NORMAL

## 2020-12-28 PROCEDURE — 3288F FALL RISK ASSESSMENT DOCD: CPT | Mod: CPTII,S$GLB,, | Performed by: PHYSICIAN ASSISTANT

## 2020-12-28 PROCEDURE — 99999 PR PBB SHADOW E&M-EST. PATIENT-LVL III: ICD-10-PCS | Mod: PBBFAC,,, | Performed by: PHYSICIAN ASSISTANT

## 2020-12-28 PROCEDURE — 1101F PR PT FALLS ASSESS DOC 0-1 FALLS W/OUT INJ PAST YR: ICD-10-PCS | Mod: CPTII,S$GLB,, | Performed by: PHYSICIAN ASSISTANT

## 2020-12-28 PROCEDURE — 3008F PR BODY MASS INDEX (BMI) DOCUMENTED: ICD-10-PCS | Mod: CPTII,S$GLB,, | Performed by: PHYSICIAN ASSISTANT

## 2020-12-28 PROCEDURE — 99024 POSTOP FOLLOW-UP VISIT: CPT | Mod: S$GLB,,, | Performed by: PHYSICIAN ASSISTANT

## 2020-12-28 PROCEDURE — 3008F BODY MASS INDEX DOCD: CPT | Mod: CPTII,S$GLB,, | Performed by: PHYSICIAN ASSISTANT

## 2020-12-28 PROCEDURE — 99999 PR PBB SHADOW E&M-EST. PATIENT-LVL III: CPT | Mod: PBBFAC,,, | Performed by: PHYSICIAN ASSISTANT

## 2020-12-28 PROCEDURE — 99024 PR POST-OP FOLLOW-UP VISIT: ICD-10-PCS | Mod: S$GLB,,, | Performed by: PHYSICIAN ASSISTANT

## 2020-12-28 PROCEDURE — 1101F PT FALLS ASSESS-DOCD LE1/YR: CPT | Mod: CPTII,S$GLB,, | Performed by: PHYSICIAN ASSISTANT

## 2020-12-28 PROCEDURE — 3288F PR FALLS RISK ASSESSMENT DOCUMENTED: ICD-10-PCS | Mod: CPTII,S$GLB,, | Performed by: PHYSICIAN ASSISTANT

## 2020-12-28 NOTE — PROGRESS NOTES
"Mr. Oneil is here today for a post-operative visit.  He is 9 days status post I&D left index and arthrotomy left index MCP joint by Dr. Zapata on 12/19/20. He reports that he is doing well. Pain is minimal. He takes Norco, this is a chronic prescription.  Pt was d/c on Colchicine and Clindamycin, reports compliance. He is scheduled to follow up with Rheumatology 2/4/21. He denies fever, chills, and sweats since the time of the surgery.     Physical exam:    Vitals:    12/28/20 1328   BP: 130/74   Pulse: 62   Weight: 104.8 kg (231 lb)   Height: 5' 7" (1.702 m)     Vital signs are stable, patient is afebrile.  Patient is well dressed and well groomed, no acute distress.  Alert and oriented to person, place, and time.  Post op dressing taken down.  Incision is clean, dry and intact, sutures in place.  There is no erythema or exudate.  He is NVI. Mild-moderate edema over the dorsal index MCP. No drainage or signs of infection.    Pathology   Results in process    Cultures negative to date    Assessment: status post I&D left index and arthrotomy left index MCP joint by Dr. Zapata on 12/19/20    Plan:  Audrey was seen today for post-op evaluation and pain.    Diagnoses and all orders for this visit:    Post-operative state  -     Ambulatory referral/consult to Physical/Occupational Therapy; Future      PO instruction reviewed and provided to patient  New radial gutter plaster splint applied   Continue abx   OT ordered to begin after next visit   Rheumatology f/u 2/4/21  RTC 1 week for suture removal         "

## 2020-12-29 LAB
FINAL PATHOLOGIC DIAGNOSIS: NORMAL
Lab: NORMAL

## 2020-12-30 ENCOUNTER — TELEPHONE (OUTPATIENT)
Dept: ELECTROPHYSIOLOGY | Facility: CLINIC | Age: 70
End: 2020-12-30

## 2020-12-30 NOTE — TELEPHONE ENCOUNTER
----- Message from Byron Gama MD sent at 12/30/2020  7:50 AM CST -----  Yes thx  ----- Message -----  From: Lj Triana RN  Sent: 12/29/2020   4:43 PM CST  To: Byron Gama MD    GP,    I received a NSRVO alert in [place] yesterday for Mr. Oneil.  I emailed tech support at [facility], and the episodes appear c/w oversensing myopotentials.  Their recommendation was to do isometrics and try to reproduce in clinic, and disabling the low frequency attenuation filter.    He is scheduled to see us and Shefali on 1/15/21.  Are you agreeable to disabling the LFA filter??    AR

## 2020-12-31 ENCOUNTER — DOCUMENTATION ONLY (OUTPATIENT)
Dept: ORTHOPEDICS | Facility: CLINIC | Age: 70
End: 2020-12-31

## 2020-12-31 NOTE — PROGRESS NOTES
Msg received from OT scheduling. They have been unsuccessful in contacting pt. They were able to reach pt son who stated he will try to call him but cant guarantee that his father will give us a call back to get scheduled.

## 2021-01-01 ENCOUNTER — TELEPHONE (OUTPATIENT)
Dept: ELECTROPHYSIOLOGY | Facility: CLINIC | Age: 71
End: 2021-01-01
Payer: MEDICARE

## 2021-01-01 ENCOUNTER — PES CALL (OUTPATIENT)
Dept: ADMINISTRATIVE | Facility: CLINIC | Age: 71
End: 2021-01-01
Payer: MEDICARE

## 2021-01-01 ENCOUNTER — PATIENT OUTREACH (OUTPATIENT)
Dept: ADMINISTRATIVE | Facility: CLINIC | Age: 71
End: 2021-01-01
Payer: MEDICARE

## 2021-01-01 ENCOUNTER — CLINICAL SUPPORT (OUTPATIENT)
Dept: CARDIOLOGY | Facility: HOSPITAL | Age: 71
End: 2021-01-01
Payer: MEDICARE

## 2021-01-01 ENCOUNTER — OFFICE VISIT (OUTPATIENT)
Dept: INTERNAL MEDICINE | Facility: CLINIC | Age: 71
End: 2021-01-01
Payer: MEDICARE

## 2021-01-01 ENCOUNTER — HOSPITAL ENCOUNTER (INPATIENT)
Facility: HOSPITAL | Age: 71
LOS: 2 days | Discharge: HOME | DRG: 189 | End: 2021-12-15
Attending: EMERGENCY MEDICINE | Admitting: STUDENT IN AN ORGANIZED HEALTH CARE EDUCATION/TRAINING PROGRAM
Payer: MEDICARE

## 2021-01-01 VITALS
HEART RATE: 67 BPM | BODY MASS INDEX: 38.76 KG/M2 | SYSTOLIC BLOOD PRESSURE: 104 MMHG | HEIGHT: 67 IN | DIASTOLIC BLOOD PRESSURE: 60 MMHG | OXYGEN SATURATION: 95 % | WEIGHT: 246.94 LBS

## 2021-01-01 VITALS
SYSTOLIC BLOOD PRESSURE: 119 MMHG | HEART RATE: 71 BPM | DIASTOLIC BLOOD PRESSURE: 69 MMHG | HEIGHT: 67 IN | RESPIRATION RATE: 18 BRPM | TEMPERATURE: 98 F | WEIGHT: 242 LBS | BODY MASS INDEX: 37.98 KG/M2 | OXYGEN SATURATION: 94 %

## 2021-01-01 DIAGNOSIS — R06.02 SHORTNESS OF BREATH: ICD-10-CM

## 2021-01-01 DIAGNOSIS — I25.5 CARDIOMYOPATHY, ISCHEMIC: Chronic | ICD-10-CM

## 2021-01-01 DIAGNOSIS — E78.2 MIXED HYPERLIPIDEMIA: ICD-10-CM

## 2021-01-01 DIAGNOSIS — I47.20 VENTRICULAR TACHYCARDIA: ICD-10-CM

## 2021-01-01 DIAGNOSIS — Z86.718 HISTORY OF DVT (DEEP VEIN THROMBOSIS): Chronic | ICD-10-CM

## 2021-01-01 DIAGNOSIS — J96.01 ACUTE HYPOXEMIC RESPIRATORY FAILURE: ICD-10-CM

## 2021-01-01 DIAGNOSIS — R06.02 SOB (SHORTNESS OF BREATH): ICD-10-CM

## 2021-01-01 DIAGNOSIS — I50.42 CHRONIC COMBINED SYSTOLIC AND DIASTOLIC CONGESTIVE HEART FAILURE: Primary | ICD-10-CM

## 2021-01-01 DIAGNOSIS — Z95.810 PRESENCE OF CARDIAC RESYNCHRONIZATION THERAPY DEFIBRILLATOR (CRT-D): ICD-10-CM

## 2021-01-01 DIAGNOSIS — I25.5 ISCHEMIC CARDIOMYOPATHY: ICD-10-CM

## 2021-01-01 DIAGNOSIS — I50.42 CHRONIC COMBINED SYSTOLIC AND DIASTOLIC CONGESTIVE HEART FAILURE: ICD-10-CM

## 2021-01-01 DIAGNOSIS — M1A.9XX0 CHRONIC GOUT WITHOUT TOPHUS, UNSPECIFIED CAUSE, UNSPECIFIED SITE: ICD-10-CM

## 2021-01-01 DIAGNOSIS — Z86.711 HX PULMONARY EMBOLISM: Chronic | ICD-10-CM

## 2021-01-01 DIAGNOSIS — I25.10 CORONARY ARTERY DISEASE INVOLVING NATIVE CORONARY ARTERY OF NATIVE HEART WITHOUT ANGINA PECTORIS: Chronic | ICD-10-CM

## 2021-01-01 DIAGNOSIS — Z95.810 PRESENCE OF AUTOMATIC (IMPLANTABLE) CARDIAC DEFIBRILLATOR: ICD-10-CM

## 2021-01-01 DIAGNOSIS — I50.42 CHRONIC COMBINED SYSTOLIC (CONGESTIVE) AND DIASTOLIC (CONGESTIVE) HEART FAILURE: ICD-10-CM

## 2021-01-01 DIAGNOSIS — I10 ESSENTIAL HYPERTENSION: Chronic | ICD-10-CM

## 2021-01-01 DIAGNOSIS — J18.0 BRONCHOPNEUMONIA: ICD-10-CM

## 2021-01-01 LAB
ACID FAST MOD KINY STN SPEC: NORMAL
ALBUMIN SERPL BCP-MCNC: 3.8 G/DL (ref 3.5–5.2)
ALLENS TEST: ABNORMAL
ALP SERPL-CCNC: 73 U/L (ref 55–135)
ALT SERPL W/O P-5'-P-CCNC: 27 U/L (ref 10–44)
ANION GAP SERPL CALC-SCNC: 12 MMOL/L (ref 8–16)
ANION GAP SERPL CALC-SCNC: 13 MMOL/L (ref 8–16)
ANION GAP SERPL CALC-SCNC: 13 MMOL/L (ref 8–16)
ANION GAP SERPL CALC-SCNC: 14 MMOL/L (ref 8–16)
AST SERPL-CCNC: 29 U/L (ref 10–40)
BACTERIA SPEC AEROBE CULT: NORMAL
BACTERIA SPEC AEROBE CULT: NORMAL
BASOPHILS # BLD AUTO: 0.02 K/UL (ref 0–0.2)
BASOPHILS # BLD AUTO: 0.02 K/UL (ref 0–0.2)
BASOPHILS # BLD AUTO: 0.04 K/UL (ref 0–0.2)
BASOPHILS NFR BLD: 0.3 % (ref 0–1.9)
BASOPHILS NFR BLD: 0.3 % (ref 0–1.9)
BASOPHILS NFR BLD: 0.7 % (ref 0–1.9)
BILIRUB SERPL-MCNC: 0.7 MG/DL (ref 0.1–1)
BILIRUB UR QL STRIP: NEGATIVE
BNP SERPL-MCNC: 122 PG/ML (ref 0–99)
BUN SERPL-MCNC: 17 MG/DL (ref 8–23)
BUN SERPL-MCNC: 20 MG/DL (ref 6–30)
BUN SERPL-MCNC: 23 MG/DL (ref 8–23)
BUN SERPL-MCNC: 33 MG/DL (ref 8–23)
BUN SERPL-MCNC: 37 MG/DL (ref 8–23)
CALCIUM SERPL-MCNC: 8.6 MG/DL (ref 8.7–10.5)
CALCIUM SERPL-MCNC: 8.9 MG/DL (ref 8.7–10.5)
CALCIUM SERPL-MCNC: 9.2 MG/DL (ref 8.7–10.5)
CALCIUM SERPL-MCNC: 9.3 MG/DL (ref 8.7–10.5)
CHLORIDE SERPL-SCNC: 101 MMOL/L (ref 95–110)
CHLORIDE SERPL-SCNC: 103 MMOL/L (ref 95–110)
CHLORIDE SERPL-SCNC: 105 MMOL/L (ref 95–110)
CHLORIDE SERPL-SCNC: 98 MMOL/L (ref 95–110)
CHLORIDE SERPL-SCNC: 99 MMOL/L (ref 95–110)
CLARITY UR REFRACT.AUTO: CLEAR
CO2 SERPL-SCNC: 22 MMOL/L (ref 23–29)
CO2 SERPL-SCNC: 23 MMOL/L (ref 23–29)
CO2 SERPL-SCNC: 24 MMOL/L (ref 23–29)
CO2 SERPL-SCNC: 27 MMOL/L (ref 23–29)
COLOR UR AUTO: YELLOW
CREAT SERPL-MCNC: 1.2 MG/DL (ref 0.5–1.4)
CREAT SERPL-MCNC: 1.2 MG/DL (ref 0.5–1.4)
CREAT SERPL-MCNC: 1.3 MG/DL (ref 0.5–1.4)
CREAT SERPL-MCNC: 1.3 MG/DL (ref 0.5–1.4)
CREAT SERPL-MCNC: 1.4 MG/DL (ref 0.5–1.4)
CTP QC/QA: YES
CTP QC/QA: YES
D DIMER PPP IA.FEU-MCNC: 0.89 MG/L FEU
DELSYS: ABNORMAL
DIFFERENTIAL METHOD: ABNORMAL
EOSINOPHIL # BLD AUTO: 0 K/UL (ref 0–0.5)
EOSINOPHIL NFR BLD: 0.2 % (ref 0–8)
EOSINOPHIL NFR BLD: 0.5 % (ref 0–8)
EOSINOPHIL NFR BLD: 0.7 % (ref 0–8)
ERYTHROCYTE [DISTWIDTH] IN BLOOD BY AUTOMATED COUNT: 14.1 % (ref 11.5–14.5)
ERYTHROCYTE [DISTWIDTH] IN BLOOD BY AUTOMATED COUNT: 14.3 % (ref 11.5–14.5)
ERYTHROCYTE [DISTWIDTH] IN BLOOD BY AUTOMATED COUNT: 14.4 % (ref 11.5–14.5)
EST. GFR  (AFRICAN AMERICAN): 58.8 ML/MIN/1.73 M^2
EST. GFR  (AFRICAN AMERICAN): >60 ML/MIN/1.73 M^2
EST. GFR  (NON AFRICAN AMERICAN): 50.9 ML/MIN/1.73 M^2
EST. GFR  (NON AFRICAN AMERICAN): 55.7 ML/MIN/1.73 M^2
EST. GFR  (NON AFRICAN AMERICAN): 55.7 ML/MIN/1.73 M^2
EST. GFR  (NON AFRICAN AMERICAN): >60 ML/MIN/1.73 M^2
FLOW: 3
GLUCOSE SERPL-MCNC: 110 MG/DL (ref 70–110)
GLUCOSE SERPL-MCNC: 124 MG/DL (ref 70–110)
GLUCOSE SERPL-MCNC: 131 MG/DL (ref 70–110)
GLUCOSE SERPL-MCNC: 152 MG/DL (ref 70–110)
GLUCOSE SERPL-MCNC: 98 MG/DL (ref 70–110)
GLUCOSE UR QL STRIP: NEGATIVE
GRAM STN SPEC: NORMAL
HCO3 UR-SCNC: 27.3 MMOL/L (ref 24–28)
HCT VFR BLD AUTO: 43.1 % (ref 40–54)
HCT VFR BLD AUTO: 44.1 % (ref 40–54)
HCT VFR BLD AUTO: 44.7 % (ref 40–54)
HCT VFR BLD CALC: 41 %PCV (ref 36–54)
HCT VFR BLD CALC: 45 %PCV (ref 36–54)
HGB BLD-MCNC: 13.7 G/DL (ref 14–18)
HGB BLD-MCNC: 13.9 G/DL (ref 14–18)
HGB BLD-MCNC: 14.1 G/DL (ref 14–18)
HGB UR QL STRIP: NEGATIVE
IMM GRANULOCYTES # BLD AUTO: 0.01 K/UL (ref 0–0.04)
IMM GRANULOCYTES # BLD AUTO: 0.02 K/UL (ref 0–0.04)
IMM GRANULOCYTES # BLD AUTO: 0.03 K/UL (ref 0–0.04)
IMM GRANULOCYTES NFR BLD AUTO: 0.2 % (ref 0–0.5)
IMM GRANULOCYTES NFR BLD AUTO: 0.3 % (ref 0–0.5)
IMM GRANULOCYTES NFR BLD AUTO: 0.5 % (ref 0–0.5)
KETONES UR QL STRIP: NEGATIVE
LEUKOCYTE ESTERASE UR QL STRIP: NEGATIVE
LYMPHOCYTES # BLD AUTO: 0.8 K/UL (ref 1–4.8)
LYMPHOCYTES # BLD AUTO: 1.6 K/UL (ref 1–4.8)
LYMPHOCYTES # BLD AUTO: 1.9 K/UL (ref 1–4.8)
LYMPHOCYTES NFR BLD: 13.8 % (ref 18–48)
LYMPHOCYTES NFR BLD: 23.7 % (ref 18–48)
LYMPHOCYTES NFR BLD: 29.3 % (ref 18–48)
MAGNESIUM SERPL-MCNC: 1.8 MG/DL (ref 1.6–2.6)
MAGNESIUM SERPL-MCNC: 1.9 MG/DL (ref 1.6–2.6)
MAGNESIUM SERPL-MCNC: 2.1 MG/DL (ref 1.6–2.6)
MCH RBC QN AUTO: 28.2 PG (ref 27–31)
MCH RBC QN AUTO: 28.5 PG (ref 27–31)
MCH RBC QN AUTO: 28.7 PG (ref 27–31)
MCHC RBC AUTO-ENTMCNC: 31.5 G/DL (ref 32–36)
MCHC RBC AUTO-ENTMCNC: 31.5 G/DL (ref 32–36)
MCHC RBC AUTO-ENTMCNC: 31.8 G/DL (ref 32–36)
MCV RBC AUTO: 89 FL (ref 82–98)
MCV RBC AUTO: 90 FL (ref 82–98)
MCV RBC AUTO: 90 FL (ref 82–98)
MODE: ABNORMAL
MONOCYTES # BLD AUTO: 0.5 K/UL (ref 0.3–1)
MONOCYTES # BLD AUTO: 0.6 K/UL (ref 0.3–1)
MONOCYTES # BLD AUTO: 0.8 K/UL (ref 0.3–1)
MONOCYTES NFR BLD: 12.2 % (ref 4–15)
MONOCYTES NFR BLD: 8.9 % (ref 4–15)
MONOCYTES NFR BLD: 9.1 % (ref 4–15)
MYCOBACTERIUM SPEC QL CULT: NORMAL
NEUTROPHILS # BLD AUTO: 4 K/UL (ref 1.8–7.7)
NEUTROPHILS # BLD AUTO: 4.2 K/UL (ref 1.8–7.7)
NEUTROPHILS # BLD AUTO: 4.6 K/UL (ref 1.8–7.7)
NEUTROPHILS NFR BLD: 60.5 % (ref 38–73)
NEUTROPHILS NFR BLD: 63.4 % (ref 38–73)
NEUTROPHILS NFR BLD: 75.4 % (ref 38–73)
NITRITE UR QL STRIP: NEGATIVE
NRBC BLD-RTO: 0 /100 WBC
PCO2 BLDA: 43.6 MMHG (ref 35–45)
PH SMN: 7.41 [PH] (ref 7.35–7.45)
PH UR STRIP: 5 [PH] (ref 5–8)
PHOSPHATE SERPL-MCNC: 4 MG/DL (ref 2.7–4.5)
PHOSPHATE SERPL-MCNC: 4.1 MG/DL (ref 2.7–4.5)
PHOSPHATE SERPL-MCNC: 4.1 MG/DL (ref 2.7–4.5)
PLATELET # BLD AUTO: 172 K/UL (ref 150–450)
PLATELET # BLD AUTO: 198 K/UL (ref 150–450)
PLATELET # BLD AUTO: 205 K/UL (ref 150–450)
PMV BLD AUTO: 11 FL (ref 9.2–12.9)
PMV BLD AUTO: 11.1 FL (ref 9.2–12.9)
PMV BLD AUTO: 11.1 FL (ref 9.2–12.9)
PO2 BLDA: 79 MMHG (ref 80–100)
POC BE: 3 MMOL/L
POC IONIZED CALCIUM: 1.11 MMOL/L (ref 1.06–1.42)
POC IONIZED CALCIUM: 1.18 MMOL/L (ref 1.06–1.42)
POC MOLECULAR INFLUENZA A AGN: NEGATIVE
POC MOLECULAR INFLUENZA B AGN: NEGATIVE
POC SATURATED O2: 95 % (ref 95–100)
POC TCO2 (MEASURED): 27 MMOL/L (ref 23–29)
POC TCO2: 29 MMOL/L (ref 23–27)
POCT GLUCOSE: 111 MG/DL (ref 70–110)
POCT GLUCOSE: 127 MG/DL (ref 70–110)
POCT GLUCOSE: 131 MG/DL (ref 70–110)
POCT GLUCOSE: 167 MG/DL (ref 70–110)
POCT GLUCOSE: 186 MG/DL (ref 70–110)
POCT GLUCOSE: 193 MG/DL (ref 70–110)
POTASSIUM BLD-SCNC: 3.4 MMOL/L (ref 3.5–5.1)
POTASSIUM BLD-SCNC: 4.1 MMOL/L (ref 3.5–5.1)
POTASSIUM SERPL-SCNC: 3.4 MMOL/L (ref 3.5–5.1)
POTASSIUM SERPL-SCNC: 3.5 MMOL/L (ref 3.5–5.1)
POTASSIUM SERPL-SCNC: 3.5 MMOL/L (ref 3.5–5.1)
POTASSIUM SERPL-SCNC: 4.1 MMOL/L (ref 3.5–5.1)
PROCALCITONIN SERPL IA-MCNC: 0.09 NG/ML
PROT SERPL-MCNC: 7.2 G/DL (ref 6–8.4)
PROT UR QL STRIP: NEGATIVE
RBC # BLD AUTO: 4.78 M/UL (ref 4.6–6.2)
RBC # BLD AUTO: 4.88 M/UL (ref 4.6–6.2)
RBC # BLD AUTO: 5 M/UL (ref 4.6–6.2)
SAMPLE: ABNORMAL
SAMPLE: ABNORMAL
SARS-COV-2 RDRP RESP QL NAA+PROBE: NEGATIVE
SITE: ABNORMAL
SODIUM BLD-SCNC: 140 MMOL/L (ref 136–145)
SODIUM BLD-SCNC: 140 MMOL/L (ref 136–145)
SODIUM SERPL-SCNC: 136 MMOL/L (ref 136–145)
SODIUM SERPL-SCNC: 137 MMOL/L (ref 136–145)
SODIUM SERPL-SCNC: 138 MMOL/L (ref 136–145)
SODIUM SERPL-SCNC: 140 MMOL/L (ref 136–145)
SP GR UR STRIP: 1.01 (ref 1–1.03)
SP02: 92
TROPONIN I SERPL DL<=0.01 NG/ML-MCNC: 0.04 NG/ML (ref 0–0.03)
TROPONIN I SERPL DL<=0.01 NG/ML-MCNC: 0.05 NG/ML (ref 0–0.03)
URN SPEC COLLECT METH UR: NORMAL
WBC # BLD AUTO: 6.1 K/UL (ref 3.9–12.7)
WBC # BLD AUTO: 6.58 K/UL (ref 3.9–12.7)
WBC # BLD AUTO: 6.61 K/UL (ref 3.9–12.7)

## 2021-01-01 PROCEDURE — 25000003 PHARM REV CODE 250: Performed by: STUDENT IN AN ORGANIZED HEALTH CARE EDUCATION/TRAINING PROGRAM

## 2021-01-01 PROCEDURE — 63600175 PHARM REV CODE 636 W HCPCS: Performed by: HOSPITALIST

## 2021-01-01 PROCEDURE — G0378 HOSPITAL OBSERVATION PER HR: HCPCS

## 2021-01-01 PROCEDURE — 84100 ASSAY OF PHOSPHORUS: CPT | Performed by: PHYSICIAN ASSISTANT

## 2021-01-01 PROCEDURE — 99232 PR SUBSEQUENT HOSPITAL CARE,LEVL II: ICD-10-PCS | Mod: ,,, | Performed by: HOSPITALIST

## 2021-01-01 PROCEDURE — 25000003 PHARM REV CODE 250: Performed by: PHYSICIAN ASSISTANT

## 2021-01-01 PROCEDURE — 99499 RISK ADDL DX/OHS AUDIT: ICD-10-PCS | Mod: S$GLB,,, | Performed by: INTERNAL MEDICINE

## 2021-01-01 PROCEDURE — 90471 IMMUNIZATION ADMIN: CPT | Performed by: HOSPITALIST

## 2021-01-01 PROCEDURE — 87502 INFLUENZA DNA AMP PROBE: CPT

## 2021-01-01 PROCEDURE — 87205 SMEAR GRAM STAIN: CPT | Performed by: HOSPITALIST

## 2021-01-01 PROCEDURE — 84484 ASSAY OF TROPONIN QUANT: CPT | Mod: 91 | Performed by: PHYSICIAN ASSISTANT

## 2021-01-01 PROCEDURE — 94640 AIRWAY INHALATION TREATMENT: CPT

## 2021-01-01 PROCEDURE — 99285 EMERGENCY DEPT VISIT HI MDM: CPT | Mod: CR,CS,, | Performed by: EMERGENCY MEDICINE

## 2021-01-01 PROCEDURE — 93010 EKG 12-LEAD: ICD-10-PCS | Mod: ,,, | Performed by: INTERNAL MEDICINE

## 2021-01-01 PROCEDURE — 36415 COLL VENOUS BLD VENIPUNCTURE: CPT | Performed by: PHYSICIAN ASSISTANT

## 2021-01-01 PROCEDURE — 84484 ASSAY OF TROPONIN QUANT: CPT | Performed by: STUDENT IN AN ORGANIZED HEALTH CARE EDUCATION/TRAINING PROGRAM

## 2021-01-01 PROCEDURE — 99232 SBSQ HOSP IP/OBS MODERATE 35: CPT | Mod: ,,, | Performed by: HOSPITALIST

## 2021-01-01 PROCEDURE — 25000242 PHARM REV CODE 250 ALT 637 W/ HCPCS: Performed by: STUDENT IN AN ORGANIZED HEALTH CARE EDUCATION/TRAINING PROGRAM

## 2021-01-01 PROCEDURE — U0002 COVID-19 LAB TEST NON-CDC: HCPCS | Performed by: STUDENT IN AN ORGANIZED HEALTH CARE EDUCATION/TRAINING PROGRAM

## 2021-01-01 PROCEDURE — 99285 EMERGENCY DEPT VISIT HI MDM: CPT | Mod: 25

## 2021-01-01 PROCEDURE — 85025 COMPLETE CBC W/AUTO DIFF WBC: CPT | Performed by: PHYSICIAN ASSISTANT

## 2021-01-01 PROCEDURE — 25000242 PHARM REV CODE 250 ALT 637 W/ HCPCS: Performed by: HOSPITALIST

## 2021-01-01 PROCEDURE — 27000221 HC OXYGEN, UP TO 24 HOURS

## 2021-01-01 PROCEDURE — 94664 DEMO&/EVAL PT USE INHALER: CPT

## 2021-01-01 PROCEDURE — 99900035 HC TECH TIME PER 15 MIN (STAT)

## 2021-01-01 PROCEDURE — 94761 N-INVAS EAR/PLS OXIMETRY MLT: CPT

## 2021-01-01 PROCEDURE — 80048 BASIC METABOLIC PNL TOTAL CA: CPT | Performed by: PHYSICIAN ASSISTANT

## 2021-01-01 PROCEDURE — 84145 PROCALCITONIN (PCT): CPT | Performed by: PHYSICIAN ASSISTANT

## 2021-01-01 PROCEDURE — 83880 ASSAY OF NATRIURETIC PEPTIDE: CPT | Performed by: STUDENT IN AN ORGANIZED HEALTH CARE EDUCATION/TRAINING PROGRAM

## 2021-01-01 PROCEDURE — 93005 ELECTROCARDIOGRAM TRACING: CPT

## 2021-01-01 PROCEDURE — 4010F ACE/ARB THERAPY RXD/TAKEN: CPT | Mod: CPTII,S$GLB,, | Performed by: INTERNAL MEDICINE

## 2021-01-01 PROCEDURE — 99999 PR PBB SHADOW E&M-EST. PATIENT-LVL IV: ICD-10-PCS | Mod: PBBFAC,,, | Performed by: INTERNAL MEDICINE

## 2021-01-01 PROCEDURE — 93010 ELECTROCARDIOGRAM REPORT: CPT | Mod: ,,, | Performed by: INTERNAL MEDICINE

## 2021-01-01 PROCEDURE — 99496 TRANSITIONAL CARE MANAGE SERVICE 7 DAY DISCHARGE: ICD-10-PCS | Mod: S$GLB,,, | Performed by: INTERNAL MEDICINE

## 2021-01-01 PROCEDURE — 63700000 PHARM REV CODE 250 ALT 637 W/O HCPCS: Performed by: PHYSICIAN ASSISTANT

## 2021-01-01 PROCEDURE — G0008 ADMIN INFLUENZA VIRUS VAC: HCPCS | Performed by: HOSPITALIST

## 2021-01-01 PROCEDURE — 99285 PR EMERGENCY DEPT VISIT,LEVEL V: ICD-10-PCS | Mod: CR,CS,, | Performed by: EMERGENCY MEDICINE

## 2021-01-01 PROCEDURE — 11000001 HC ACUTE MED/SURG PRIVATE ROOM

## 2021-01-01 PROCEDURE — 99223 1ST HOSP IP/OBS HIGH 75: CPT | Mod: GC,,, | Performed by: INTERNAL MEDICINE

## 2021-01-01 PROCEDURE — 25000003 PHARM REV CODE 250: Performed by: HOSPITALIST

## 2021-01-01 PROCEDURE — 99239 HOSP IP/OBS DSCHRG MGMT >30: CPT | Mod: ,,, | Performed by: HOSPITALIST

## 2021-01-01 PROCEDURE — 99220 PR INITIAL OBSERVATION CARE,LEVL III: ICD-10-PCS | Mod: ,,, | Performed by: PHYSICIAN ASSISTANT

## 2021-01-01 PROCEDURE — 63600175 PHARM REV CODE 636 W HCPCS: Performed by: STUDENT IN AN ORGANIZED HEALTH CARE EDUCATION/TRAINING PROGRAM

## 2021-01-01 PROCEDURE — 63600175 PHARM REV CODE 636 W HCPCS: Performed by: PHYSICIAN ASSISTANT

## 2021-01-01 PROCEDURE — 93295 DEV INTERROG REMOTE 1/2/MLT: CPT | Mod: ,,, | Performed by: INTERNAL MEDICINE

## 2021-01-01 PROCEDURE — 87070 CULTURE OTHR SPECIMN AEROBIC: CPT | Performed by: HOSPITALIST

## 2021-01-01 PROCEDURE — 83735 ASSAY OF MAGNESIUM: CPT | Performed by: PHYSICIAN ASSISTANT

## 2021-01-01 PROCEDURE — 99223 PR INITIAL HOSPITAL CARE,LEVL III: ICD-10-PCS | Mod: GC,,, | Performed by: INTERNAL MEDICINE

## 2021-01-01 PROCEDURE — 25000242 PHARM REV CODE 250 ALT 637 W/ HCPCS: Performed by: PHYSICIAN ASSISTANT

## 2021-01-01 PROCEDURE — 25500020 PHARM REV CODE 255: Performed by: EMERGENCY MEDICINE

## 2021-01-01 PROCEDURE — 27000646 HC AEROBIKA DEVICE

## 2021-01-01 PROCEDURE — 96374 THER/PROPH/DIAG INJ IV PUSH: CPT

## 2021-01-01 PROCEDURE — 80047 BASIC METABLC PNL IONIZED CA: CPT

## 2021-01-01 PROCEDURE — 85379 FIBRIN DEGRADATION QUANT: CPT | Performed by: EMERGENCY MEDICINE

## 2021-01-01 PROCEDURE — 93295 CARDIAC DEVICE CHECK - REMOTE: ICD-10-PCS | Mod: ,,, | Performed by: INTERNAL MEDICINE

## 2021-01-01 PROCEDURE — 99499 UNLISTED E&M SERVICE: CPT | Mod: S$GLB,,, | Performed by: INTERNAL MEDICINE

## 2021-01-01 PROCEDURE — 96375 TX/PRO/DX INJ NEW DRUG ADDON: CPT

## 2021-01-01 PROCEDURE — 82803 BLOOD GASES ANY COMBINATION: CPT

## 2021-01-01 PROCEDURE — 99239 PR HOSPITAL DISCHARGE DAY,>30 MIN: ICD-10-PCS | Mod: ,,, | Performed by: HOSPITALIST

## 2021-01-01 PROCEDURE — 4010F PR ACE/ARB THEARPY RXD/TAKEN: ICD-10-PCS | Mod: CPTII,S$GLB,, | Performed by: INTERNAL MEDICINE

## 2021-01-01 PROCEDURE — 99220 PR INITIAL OBSERVATION CARE,LEVL III: CPT | Mod: ,,, | Performed by: PHYSICIAN ASSISTANT

## 2021-01-01 PROCEDURE — 85025 COMPLETE CBC W/AUTO DIFF WBC: CPT | Performed by: STUDENT IN AN ORGANIZED HEALTH CARE EDUCATION/TRAINING PROGRAM

## 2021-01-01 PROCEDURE — 81003 URINALYSIS AUTO W/O SCOPE: CPT | Performed by: STUDENT IN AN ORGANIZED HEALTH CARE EDUCATION/TRAINING PROGRAM

## 2021-01-01 PROCEDURE — 93296 REM INTERROG EVL PM/IDS: CPT | Performed by: INTERNAL MEDICINE

## 2021-01-01 PROCEDURE — 99999 PR PBB SHADOW E&M-EST. PATIENT-LVL IV: CPT | Mod: PBBFAC,,, | Performed by: INTERNAL MEDICINE

## 2021-01-01 PROCEDURE — 99496 TRANSJ CARE MGMT HIGH F2F 7D: CPT | Mod: S$GLB,,, | Performed by: INTERNAL MEDICINE

## 2021-01-01 PROCEDURE — 36600 WITHDRAWAL OF ARTERIAL BLOOD: CPT

## 2021-01-01 PROCEDURE — 90694 VACC AIIV4 NO PRSRV 0.5ML IM: CPT | Performed by: HOSPITALIST

## 2021-01-01 PROCEDURE — 80053 COMPREHEN METABOLIC PANEL: CPT | Performed by: STUDENT IN AN ORGANIZED HEALTH CARE EDUCATION/TRAINING PROGRAM

## 2021-01-01 RX ORDER — AZITHROMYCIN 250 MG/1
250 TABLET, FILM COATED ORAL DAILY
Status: DISCONTINUED | OUTPATIENT
Start: 2021-01-01 | End: 2021-01-01 | Stop reason: HOSPADM

## 2021-01-01 RX ORDER — ALLOPURINOL 100 MG/1
200 TABLET ORAL DAILY
Qty: 180 TABLET | Refills: 1 | Status: SHIPPED | OUTPATIENT
Start: 2021-01-01 | End: 2022-01-01 | Stop reason: SDUPTHER

## 2021-01-01 RX ORDER — MUPIROCIN 20 MG/G
OINTMENT TOPICAL 2 TIMES DAILY
Status: DISCONTINUED | OUTPATIENT
Start: 2021-01-01 | End: 2021-01-01 | Stop reason: HOSPADM

## 2021-01-01 RX ORDER — METHYLPREDNISOLONE 4 MG/1
TABLET ORAL
Qty: 21 EACH | Refills: 0 | Status: SHIPPED | OUTPATIENT
Start: 2021-01-01 | End: 2022-01-01

## 2021-01-01 RX ORDER — HYDROCODONE BITARTRATE AND ACETAMINOPHEN 5; 325 MG/1; MG/1
1 TABLET ORAL
Status: COMPLETED | OUTPATIENT
Start: 2021-01-01 | End: 2021-01-01

## 2021-01-01 RX ORDER — PREDNISONE 20 MG/1
40 TABLET ORAL DAILY
Status: DISCONTINUED | OUTPATIENT
Start: 2021-01-01 | End: 2021-01-01 | Stop reason: HOSPADM

## 2021-01-01 RX ORDER — ATORVASTATIN CALCIUM 80 MG/1
80 TABLET, FILM COATED ORAL DAILY
Qty: 90 TABLET | Refills: 1 | Status: SHIPPED | OUTPATIENT
Start: 2021-01-01 | End: 2021-01-01 | Stop reason: SDUPTHER

## 2021-01-01 RX ORDER — DOXYCYCLINE HYCLATE 100 MG
100 TABLET ORAL 2 TIMES DAILY
Qty: 30 TABLET | Refills: 0 | Status: SHIPPED | OUTPATIENT
Start: 2021-01-01 | End: 2022-01-01

## 2021-01-01 RX ORDER — BENZONATATE 100 MG/1
100 CAPSULE ORAL 3 TIMES DAILY PRN
Status: DISCONTINUED | OUTPATIENT
Start: 2021-01-01 | End: 2021-01-01 | Stop reason: HOSPADM

## 2021-01-01 RX ORDER — MONTELUKAST SODIUM 5 MG/1
5 TABLET, CHEWABLE ORAL NIGHTLY
Qty: 30 TABLET | Refills: 0 | Status: SHIPPED | OUTPATIENT
Start: 2021-01-01 | End: 2022-01-01

## 2021-01-01 RX ORDER — IPRATROPIUM BROMIDE AND ALBUTEROL SULFATE 2.5; .5 MG/3ML; MG/3ML
3 SOLUTION RESPIRATORY (INHALATION)
Status: DISCONTINUED | OUTPATIENT
Start: 2021-01-01 | End: 2021-01-01 | Stop reason: HOSPADM

## 2021-01-01 RX ORDER — COLCHICINE 0.6 MG/1
0.6 TABLET ORAL DAILY
Qty: 90 TABLET | Refills: 1 | Status: SHIPPED | OUTPATIENT
Start: 2021-01-01 | End: 2022-01-01

## 2021-01-01 RX ORDER — FUROSEMIDE 40 MG/1
40 TABLET ORAL 2 TIMES DAILY
Start: 2021-01-01 | End: 2022-01-01

## 2021-01-01 RX ORDER — BENZONATATE 100 MG/1
100 CAPSULE ORAL 3 TIMES DAILY PRN
Qty: 30 CAPSULE | Refills: 1 | Status: SHIPPED | OUTPATIENT
Start: 2021-01-01 | End: 2022-01-01

## 2021-01-01 RX ORDER — ONDANSETRON 8 MG/1
8 TABLET, ORALLY DISINTEGRATING ORAL EVERY 8 HOURS PRN
Status: DISCONTINUED | OUTPATIENT
Start: 2021-01-01 | End: 2021-01-01 | Stop reason: HOSPADM

## 2021-01-01 RX ORDER — COLCHICINE 0.6 MG/1
0.6 TABLET, FILM COATED ORAL DAILY
Status: DISCONTINUED | OUTPATIENT
Start: 2021-01-01 | End: 2021-01-01 | Stop reason: HOSPADM

## 2021-01-01 RX ORDER — PREDNISONE 20 MG/1
40 TABLET ORAL DAILY
Qty: 4 TABLET | Refills: 0 | Status: SHIPPED | OUTPATIENT
Start: 2021-01-01 | End: 2021-01-01

## 2021-01-01 RX ORDER — ATORVASTATIN CALCIUM 80 MG/1
80 TABLET, FILM COATED ORAL NIGHTLY
Qty: 90 TABLET | Refills: 1 | Status: SHIPPED | OUTPATIENT
Start: 2021-01-01 | End: 2021-01-01

## 2021-01-01 RX ORDER — ALLOPURINOL 100 MG/1
100 TABLET ORAL 2 TIMES DAILY
Status: DISCONTINUED | OUTPATIENT
Start: 2021-01-01 | End: 2021-01-01 | Stop reason: HOSPADM

## 2021-01-01 RX ORDER — IPRATROPIUM BROMIDE AND ALBUTEROL SULFATE 2.5; .5 MG/3ML; MG/3ML
3 SOLUTION RESPIRATORY (INHALATION)
Status: DISCONTINUED | OUTPATIENT
Start: 2021-01-01 | End: 2021-01-01

## 2021-01-01 RX ORDER — POTASSIUM CHLORIDE 20 MEQ/1
20 TABLET, EXTENDED RELEASE ORAL DAILY
Status: DISCONTINUED | OUTPATIENT
Start: 2021-01-01 | End: 2021-01-01 | Stop reason: HOSPADM

## 2021-01-01 RX ORDER — IPRATROPIUM BROMIDE AND ALBUTEROL SULFATE 2.5; .5 MG/3ML; MG/3ML
3 SOLUTION RESPIRATORY (INHALATION)
Status: COMPLETED | OUTPATIENT
Start: 2021-01-01 | End: 2021-01-01

## 2021-01-01 RX ORDER — PROMETHAZINE HYDROCHLORIDE 25 MG/1
25 TABLET ORAL EVERY 6 HOURS PRN
Status: DISCONTINUED | OUTPATIENT
Start: 2021-01-01 | End: 2021-01-01 | Stop reason: HOSPADM

## 2021-01-01 RX ORDER — ATORVASTATIN CALCIUM 40 MG/1
80 TABLET, FILM COATED ORAL NIGHTLY
Status: DISCONTINUED | OUTPATIENT
Start: 2021-01-01 | End: 2021-01-01 | Stop reason: HOSPADM

## 2021-01-01 RX ORDER — IPRATROPIUM BROMIDE 0.5 MG/2.5ML
500 SOLUTION RESPIRATORY (INHALATION) 4 TIMES DAILY PRN
Qty: 75 ML | Refills: 0 | Status: SHIPPED | OUTPATIENT
Start: 2021-01-01 | End: 2022-01-01

## 2021-01-01 RX ORDER — HYDROCODONE BITARTRATE AND ACETAMINOPHEN 10; 325 MG/1; MG/1
1 TABLET ORAL EVERY 6 HOURS PRN
Status: DISCONTINUED | OUTPATIENT
Start: 2021-01-01 | End: 2021-01-01 | Stop reason: HOSPADM

## 2021-01-01 RX ORDER — SODIUM CHLORIDE 0.9 % (FLUSH) 0.9 %
3 SYRINGE (ML) INJECTION
Status: DISCONTINUED | OUTPATIENT
Start: 2021-01-01 | End: 2021-01-01 | Stop reason: HOSPADM

## 2021-01-01 RX ORDER — HYDROCODONE BITARTRATE AND ACETAMINOPHEN 10; 325 MG/1; MG/1
1 TABLET ORAL ONCE
Status: COMPLETED | OUTPATIENT
Start: 2021-01-01 | End: 2021-01-01

## 2021-01-01 RX ORDER — ASPIRIN 325 MG
325 TABLET, DELAYED RELEASE (ENTERIC COATED) ORAL DAILY
COMMUNITY
Start: 2021-01-01 | End: 2022-01-01

## 2021-01-01 RX ORDER — ACETAMINOPHEN 325 MG/1
650 TABLET ORAL EVERY 6 HOURS PRN
Status: DISCONTINUED | OUTPATIENT
Start: 2021-01-01 | End: 2021-01-01 | Stop reason: HOSPADM

## 2021-01-01 RX ORDER — FUROSEMIDE 10 MG/ML
40 INJECTION INTRAMUSCULAR; INTRAVENOUS ONCE
Status: COMPLETED | OUTPATIENT
Start: 2021-01-01 | End: 2021-01-01

## 2021-01-01 RX ORDER — ASPIRIN 81 MG/1
81 TABLET ORAL NIGHTLY
Status: DISCONTINUED | OUTPATIENT
Start: 2021-01-01 | End: 2021-01-01 | Stop reason: HOSPADM

## 2021-01-01 RX ORDER — AZITHROMYCIN 250 MG/1
500 TABLET, FILM COATED ORAL ONCE
Status: COMPLETED | OUTPATIENT
Start: 2021-01-01 | End: 2021-01-01

## 2021-01-01 RX ORDER — TALC
6 POWDER (GRAM) TOPICAL NIGHTLY PRN
Status: DISCONTINUED | OUTPATIENT
Start: 2021-01-01 | End: 2021-01-01 | Stop reason: HOSPADM

## 2021-01-01 RX ORDER — ATORVASTATIN CALCIUM 80 MG/1
80 TABLET, FILM COATED ORAL DAILY
Qty: 90 TABLET | Refills: 1 | Status: ON HOLD | OUTPATIENT
Start: 2021-01-01 | End: 2022-01-01 | Stop reason: SDUPTHER

## 2021-01-01 RX ORDER — POTASSIUM CHLORIDE 20 MEQ/1
20 TABLET, EXTENDED RELEASE ORAL DAILY
Start: 2021-01-01 | End: 2021-01-01 | Stop reason: SDUPTHER

## 2021-01-01 RX ORDER — POTASSIUM CHLORIDE 20 MEQ/1
20 TABLET, EXTENDED RELEASE ORAL DAILY
Qty: 90 TABLET | Refills: 1 | Status: SHIPPED | OUTPATIENT
Start: 2021-01-01 | End: 2022-01-01

## 2021-01-01 RX ORDER — CLOPIDOGREL BISULFATE 75 MG/1
75 TABLET ORAL DAILY
Status: DISCONTINUED | OUTPATIENT
Start: 2021-01-01 | End: 2021-01-01 | Stop reason: HOSPADM

## 2021-01-01 RX ORDER — METOPROLOL SUCCINATE 100 MG/1
100 TABLET, EXTENDED RELEASE ORAL DAILY
Status: DISCONTINUED | OUTPATIENT
Start: 2021-01-01 | End: 2021-01-01 | Stop reason: HOSPADM

## 2021-01-01 RX ORDER — FUROSEMIDE 40 MG/1
40 TABLET ORAL 2 TIMES DAILY
Status: DISCONTINUED | OUTPATIENT
Start: 2021-01-01 | End: 2021-01-01 | Stop reason: HOSPADM

## 2021-01-01 RX ORDER — POLYETHYLENE GLYCOL 3350 17 G/17G
17 POWDER, FOR SOLUTION ORAL DAILY
Status: DISCONTINUED | OUTPATIENT
Start: 2021-01-01 | End: 2021-01-01 | Stop reason: HOSPADM

## 2021-01-01 RX ORDER — AMOXICILLIN 250 MG
1 CAPSULE ORAL 2 TIMES DAILY
Status: DISCONTINUED | OUTPATIENT
Start: 2021-01-01 | End: 2021-01-01 | Stop reason: HOSPADM

## 2021-01-01 RX ORDER — DIPHENHYDRAMINE HYDROCHLORIDE 50 MG/ML
25 INJECTION INTRAMUSCULAR; INTRAVENOUS
Status: COMPLETED | OUTPATIENT
Start: 2021-01-01 | End: 2021-01-01

## 2021-01-01 RX ORDER — AZITHROMYCIN 250 MG/1
250 TABLET, FILM COATED ORAL DAILY
Qty: 2 TABLET | Refills: 0 | Status: SHIPPED | OUTPATIENT
Start: 2021-01-01 | End: 2021-01-01

## 2021-01-01 RX ADMIN — AZITHROMYCIN MONOHYDRATE 250 MG: 250 TABLET ORAL at 05:12

## 2021-01-01 RX ADMIN — IPRATROPIUM BROMIDE AND ALBUTEROL SULFATE 3 ML: .5; 3 SOLUTION RESPIRATORY (INHALATION) at 12:12

## 2021-01-01 RX ADMIN — SENNOSIDES AND DOCUSATE SODIUM 1 TABLET: 50; 8.6 TABLET ORAL at 09:12

## 2021-01-01 RX ADMIN — HYDROCODONE BITARTRATE AND ACETAMINOPHEN 1 TABLET: 10; 325 TABLET ORAL at 09:12

## 2021-01-01 RX ADMIN — SENNOSIDES AND DOCUSATE SODIUM 1 TABLET: 50; 8.6 TABLET ORAL at 08:12

## 2021-01-01 RX ADMIN — CLOPIDOGREL 75 MG: 75 TABLET, FILM COATED ORAL at 08:12

## 2021-01-01 RX ADMIN — IPRATROPIUM BROMIDE AND ALBUTEROL SULFATE 3 ML: 2.5; .5 SOLUTION RESPIRATORY (INHALATION) at 02:12

## 2021-01-01 RX ADMIN — BENZONATATE 100 MG: 100 CAPSULE ORAL at 09:12

## 2021-01-01 RX ADMIN — POTASSIUM CHLORIDE 20 MEQ: 1500 TABLET, EXTENDED RELEASE ORAL at 09:12

## 2021-01-01 RX ADMIN — ALLOPURINOL 100 MG: 100 TABLET ORAL at 09:12

## 2021-01-01 RX ADMIN — IOHEXOL 100 ML: 350 INJECTION, SOLUTION INTRAVENOUS at 02:12

## 2021-01-01 RX ADMIN — HYDROCODONE BITARTRATE AND ACETAMINOPHEN 1 TABLET: 10; 325 TABLET ORAL at 08:12

## 2021-01-01 RX ADMIN — GUAIFENESIN AND DEXTROMETHORPHAN HYDROBROMIDE 1 TABLET: 600; 30 TABLET, EXTENDED RELEASE ORAL at 09:12

## 2021-01-01 RX ADMIN — IPRATROPIUM BROMIDE AND ALBUTEROL SULFATE 3 ML: .5; 3 SOLUTION RESPIRATORY (INHALATION) at 09:12

## 2021-01-01 RX ADMIN — FUROSEMIDE 40 MG: 40 TABLET ORAL at 08:12

## 2021-01-01 RX ADMIN — HYDROCODONE BITARTRATE AND ACETAMINOPHEN 1 TABLET: 5; 325 TABLET ORAL at 02:12

## 2021-01-01 RX ADMIN — FUROSEMIDE 40 MG: 40 TABLET ORAL at 06:12

## 2021-01-01 RX ADMIN — Medication 6 MG: at 09:12

## 2021-01-01 RX ADMIN — INFLUENZA VACCINE, ADJUVANTED 0.5 ML: 15; 15; 15; 15 INJECTION, SUSPENSION INTRAMUSCULAR at 04:12

## 2021-01-01 RX ADMIN — IPRATROPIUM BROMIDE AND ALBUTEROL SULFATE 3 ML: 2.5; .5 SOLUTION RESPIRATORY (INHALATION) at 07:12

## 2021-01-01 RX ADMIN — DIPHENHYDRAMINE HYDROCHLORIDE 25 MG: 50 INJECTION INTRAMUSCULAR; INTRAVENOUS at 02:12

## 2021-01-01 RX ADMIN — SACUBITRIL AND VALSARTAN 1 TABLET: 24; 26 TABLET, FILM COATED ORAL at 08:12

## 2021-01-01 RX ADMIN — IPRATROPIUM BROMIDE AND ALBUTEROL SULFATE 3 ML: .5; 2.5 SOLUTION RESPIRATORY (INHALATION) at 02:12

## 2021-01-01 RX ADMIN — SACUBITRIL AND VALSARTAN 1 TABLET: 24; 26 TABLET, FILM COATED ORAL at 09:12

## 2021-01-01 RX ADMIN — POLYETHYLENE GLYCOL 3350 17 G: 17 POWDER, FOR SOLUTION ORAL at 08:12

## 2021-01-01 RX ADMIN — METOPROLOL SUCCINATE 100 MG: 100 TABLET, EXTENDED RELEASE ORAL at 08:12

## 2021-01-01 RX ADMIN — ALLOPURINOL 100 MG: 100 TABLET ORAL at 08:12

## 2021-01-01 RX ADMIN — MUPIROCIN: 20 OINTMENT TOPICAL at 11:12

## 2021-01-01 RX ADMIN — PREDNISONE 40 MG: 20 TABLET ORAL at 09:12

## 2021-01-01 RX ADMIN — AZITHROMYCIN MONOHYDRATE 250 MG: 250 TABLET ORAL at 08:12

## 2021-01-01 RX ADMIN — POTASSIUM CHLORIDE 20 MEQ: 1500 TABLET, EXTENDED RELEASE ORAL at 08:12

## 2021-01-01 RX ADMIN — AMIODARONE HYDROCHLORIDE 300 MG: 200 TABLET ORAL at 08:12

## 2021-01-01 RX ADMIN — IPRATROPIUM BROMIDE AND ALBUTEROL SULFATE 3 ML: 2.5; .5 SOLUTION RESPIRATORY (INHALATION) at 06:12

## 2021-01-01 RX ADMIN — HYDROCODONE BITARTRATE AND ACETAMINOPHEN 1 TABLET: 10; 325 TABLET ORAL at 05:12

## 2021-01-01 RX ADMIN — FUROSEMIDE 40 MG: 40 INJECTION, SOLUTION INTRAMUSCULAR; INTRAVENOUS at 05:12

## 2021-01-01 RX ADMIN — AZITHROMYCIN MONOHYDRATE 250 MG: 250 TABLET ORAL at 09:12

## 2021-01-01 RX ADMIN — CLOPIDOGREL 75 MG: 75 TABLET, FILM COATED ORAL at 09:12

## 2021-01-01 RX ADMIN — COLCHICINE 0.6 MG: 0.6 TABLET, FILM COATED ORAL at 09:12

## 2021-01-01 RX ADMIN — FUROSEMIDE 40 MG: 40 TABLET ORAL at 05:12

## 2021-01-01 RX ADMIN — METOPROLOL SUCCINATE 100 MG: 100 TABLET, EXTENDED RELEASE ORAL at 09:12

## 2021-01-01 RX ADMIN — AZITHROMYCIN MONOHYDRATE 500 MG: 250 TABLET ORAL at 05:12

## 2021-01-01 RX ADMIN — IPRATROPIUM BROMIDE AND ALBUTEROL SULFATE 3 ML: .5; 3 SOLUTION RESPIRATORY (INHALATION) at 04:12

## 2021-01-01 RX ADMIN — IPRATROPIUM BROMIDE AND ALBUTEROL SULFATE 3 ML: .5; 3 SOLUTION RESPIRATORY (INHALATION) at 08:12

## 2021-01-01 RX ADMIN — ASPIRIN 81 MG: 81 TABLET, COATED ORAL at 08:12

## 2021-01-01 RX ADMIN — AMIODARONE HYDROCHLORIDE 300 MG: 200 TABLET ORAL at 09:12

## 2021-01-01 RX ADMIN — ASPIRIN 81 MG: 81 TABLET, COATED ORAL at 09:12

## 2021-01-01 RX ADMIN — ATORVASTATIN CALCIUM 80 MG: 40 TABLET, FILM COATED ORAL at 09:12

## 2021-01-01 RX ADMIN — PREDNISONE 40 MG: 20 TABLET ORAL at 08:12

## 2021-01-01 RX ADMIN — FUROSEMIDE 40 MG: 40 TABLET ORAL at 09:12

## 2021-01-01 RX ADMIN — IPRATROPIUM BROMIDE AND ALBUTEROL SULFATE 3 ML: 2.5; .5 SOLUTION RESPIRATORY (INHALATION) at 01:12

## 2021-01-01 RX ADMIN — ATORVASTATIN CALCIUM 80 MG: 40 TABLET, FILM COATED ORAL at 08:12

## 2021-01-01 RX ADMIN — Medication 6 MG: at 08:12

## 2021-01-01 RX ADMIN — COLCHICINE 0.6 MG: 0.6 TABLET, FILM COATED ORAL at 08:12

## 2021-01-04 ENCOUNTER — OFFICE VISIT (OUTPATIENT)
Dept: ORTHOPEDICS | Facility: CLINIC | Age: 71
End: 2021-01-04
Payer: MEDICARE

## 2021-01-04 VITALS
BODY MASS INDEX: 36.26 KG/M2 | DIASTOLIC BLOOD PRESSURE: 71 MMHG | HEIGHT: 67 IN | HEART RATE: 77 BPM | SYSTOLIC BLOOD PRESSURE: 118 MMHG | WEIGHT: 231 LBS

## 2021-01-04 DIAGNOSIS — Z98.890 POST-OPERATIVE STATE: Primary | ICD-10-CM

## 2021-01-04 PROCEDURE — 3008F BODY MASS INDEX DOCD: CPT | Mod: CPTII,S$GLB,, | Performed by: PHYSICIAN ASSISTANT

## 2021-01-04 PROCEDURE — 3288F FALL RISK ASSESSMENT DOCD: CPT | Mod: CPTII,S$GLB,, | Performed by: PHYSICIAN ASSISTANT

## 2021-01-04 PROCEDURE — 1101F PT FALLS ASSESS-DOCD LE1/YR: CPT | Mod: CPTII,S$GLB,, | Performed by: PHYSICIAN ASSISTANT

## 2021-01-04 PROCEDURE — 99024 PR POST-OP FOLLOW-UP VISIT: ICD-10-PCS | Mod: S$GLB,,, | Performed by: PHYSICIAN ASSISTANT

## 2021-01-04 PROCEDURE — 3288F PR FALLS RISK ASSESSMENT DOCUMENTED: ICD-10-PCS | Mod: CPTII,S$GLB,, | Performed by: PHYSICIAN ASSISTANT

## 2021-01-04 PROCEDURE — 99999 PR PBB SHADOW E&M-EST. PATIENT-LVL III: CPT | Mod: PBBFAC,,, | Performed by: PHYSICIAN ASSISTANT

## 2021-01-04 PROCEDURE — 99999 PR PBB SHADOW E&M-EST. PATIENT-LVL III: ICD-10-PCS | Mod: PBBFAC,,, | Performed by: PHYSICIAN ASSISTANT

## 2021-01-04 PROCEDURE — 1101F PR PT FALLS ASSESS DOC 0-1 FALLS W/OUT INJ PAST YR: ICD-10-PCS | Mod: CPTII,S$GLB,, | Performed by: PHYSICIAN ASSISTANT

## 2021-01-04 PROCEDURE — 3008F PR BODY MASS INDEX (BMI) DOCUMENTED: ICD-10-PCS | Mod: CPTII,S$GLB,, | Performed by: PHYSICIAN ASSISTANT

## 2021-01-04 PROCEDURE — 1125F PR PAIN SEVERITY QUANTIFIED, PAIN PRESENT: ICD-10-PCS | Mod: S$GLB,,, | Performed by: PHYSICIAN ASSISTANT

## 2021-01-04 PROCEDURE — 1125F AMNT PAIN NOTED PAIN PRSNT: CPT | Mod: S$GLB,,, | Performed by: PHYSICIAN ASSISTANT

## 2021-01-04 PROCEDURE — 99024 POSTOP FOLLOW-UP VISIT: CPT | Mod: S$GLB,,, | Performed by: PHYSICIAN ASSISTANT

## 2021-01-08 ENCOUNTER — DOCUMENTATION ONLY (OUTPATIENT)
Dept: REHABILITATION | Facility: HOSPITAL | Age: 71
End: 2021-01-08

## 2021-01-13 ENCOUNTER — PATIENT OUTREACH (OUTPATIENT)
Dept: ADMINISTRATIVE | Facility: OTHER | Age: 71
End: 2021-01-13

## 2021-01-15 ENCOUNTER — TELEPHONE (OUTPATIENT)
Dept: ORTHOPEDICS | Facility: CLINIC | Age: 71
End: 2021-01-15

## 2021-01-19 ENCOUNTER — HOSPITAL ENCOUNTER (OUTPATIENT)
Facility: HOSPITAL | Age: 71
Discharge: HOME | End: 2021-01-22
Attending: EMERGENCY MEDICINE | Admitting: EMERGENCY MEDICINE
Payer: MEDICARE

## 2021-01-19 DIAGNOSIS — I25.5 CARDIOMYOPATHY, ISCHEMIC: Chronic | ICD-10-CM

## 2021-01-19 DIAGNOSIS — I50.43 ACUTE ON CHRONIC COMBINED SYSTOLIC AND DIASTOLIC CONGESTIVE HEART FAILURE: ICD-10-CM

## 2021-01-19 DIAGNOSIS — R06.02 SOB (SHORTNESS OF BREATH): ICD-10-CM

## 2021-01-19 DIAGNOSIS — I50.9 ACUTE ON CHRONIC CONGESTIVE HEART FAILURE, UNSPECIFIED HEART FAILURE TYPE: Primary | ICD-10-CM

## 2021-01-19 DIAGNOSIS — I50.23 ACUTE ON CHRONIC SYSTOLIC CONGESTIVE HEART FAILURE: ICD-10-CM

## 2021-01-19 DIAGNOSIS — R79.89 ELEVATED TROPONIN: ICD-10-CM

## 2021-01-19 DIAGNOSIS — I50.42 CHRONIC COMBINED SYSTOLIC AND DIASTOLIC CONGESTIVE HEART FAILURE: ICD-10-CM

## 2021-01-19 DIAGNOSIS — N17.9 AKI (ACUTE KIDNEY INJURY): ICD-10-CM

## 2021-01-19 PROBLEM — M1A.9XX0 CHRONIC GOUT: Chronic | Status: ACTIVE | Noted: 2019-12-02

## 2021-01-19 LAB
ALBUMIN SERPL BCP-MCNC: 3.9 G/DL (ref 3.5–5.2)
ALP SERPL-CCNC: 75 U/L (ref 55–135)
ALT SERPL W/O P-5'-P-CCNC: 27 U/L (ref 10–44)
ANION GAP SERPL CALC-SCNC: 11 MMOL/L (ref 8–16)
AST SERPL-CCNC: 25 U/L (ref 10–40)
BASOPHILS # BLD AUTO: 0.07 K/UL (ref 0–0.2)
BASOPHILS NFR BLD: 1 % (ref 0–1.9)
BILIRUB SERPL-MCNC: 0.6 MG/DL (ref 0.1–1)
BNP SERPL-MCNC: 423 PG/ML (ref 0–99)
BUN SERPL-MCNC: 16 MG/DL (ref 8–23)
BUN SERPL-MCNC: 19 MG/DL (ref 6–30)
CALCIUM SERPL-MCNC: 8.9 MG/DL (ref 8.7–10.5)
CHLORIDE SERPL-SCNC: 106 MMOL/L (ref 95–110)
CHLORIDE SERPL-SCNC: 108 MMOL/L (ref 95–110)
CO2 SERPL-SCNC: 24 MMOL/L (ref 23–29)
CREAT SERPL-MCNC: 1 MG/DL (ref 0.5–1.4)
CREAT SERPL-MCNC: 1 MG/DL (ref 0.5–1.4)
CTP QC/QA: YES
DIFFERENTIAL METHOD: ABNORMAL
EOSINOPHIL # BLD AUTO: 0.2 K/UL (ref 0–0.5)
EOSINOPHIL NFR BLD: 2.7 % (ref 0–8)
ERYTHROCYTE [DISTWIDTH] IN BLOOD BY AUTOMATED COUNT: 13.6 % (ref 11.5–14.5)
EST. GFR  (AFRICAN AMERICAN): >60 ML/MIN/1.73 M^2
EST. GFR  (NON AFRICAN AMERICAN): >60 ML/MIN/1.73 M^2
GLUCOSE SERPL-MCNC: 92 MG/DL (ref 70–110)
GLUCOSE SERPL-MCNC: 95 MG/DL (ref 70–110)
HCT VFR BLD AUTO: 44.8 % (ref 40–54)
HCT VFR BLD CALC: 43 %PCV (ref 36–54)
HGB BLD-MCNC: 14.2 G/DL (ref 14–18)
IMM GRANULOCYTES # BLD AUTO: 0.03 K/UL (ref 0–0.04)
IMM GRANULOCYTES NFR BLD AUTO: 0.4 % (ref 0–0.5)
LYMPHOCYTES # BLD AUTO: 2.6 K/UL (ref 1–4.8)
LYMPHOCYTES NFR BLD: 36.5 % (ref 18–48)
MAGNESIUM SERPL-MCNC: 2.1 MG/DL (ref 1.6–2.6)
MCH RBC QN AUTO: 30.3 PG (ref 27–31)
MCHC RBC AUTO-ENTMCNC: 31.7 G/DL (ref 32–36)
MCV RBC AUTO: 96 FL (ref 82–98)
MONOCYTES # BLD AUTO: 0.7 K/UL (ref 0.3–1)
MONOCYTES NFR BLD: 10 % (ref 4–15)
NEUTROPHILS # BLD AUTO: 3.5 K/UL (ref 1.8–7.7)
NEUTROPHILS NFR BLD: 49.4 % (ref 38–73)
NRBC BLD-RTO: 0 /100 WBC
PLATELET # BLD AUTO: 177 K/UL (ref 150–350)
PMV BLD AUTO: 10.9 FL (ref 9.2–12.9)
POC IONIZED CALCIUM: 1.13 MMOL/L (ref 1.06–1.42)
POC TCO2 (MEASURED): 28 MMOL/L (ref 23–29)
POTASSIUM BLD-SCNC: 4.6 MMOL/L (ref 3.5–5.1)
POTASSIUM SERPL-SCNC: 4.4 MMOL/L (ref 3.5–5.1)
PROT SERPL-MCNC: 6.6 G/DL (ref 6–8.4)
RBC # BLD AUTO: 4.69 M/UL (ref 4.6–6.2)
SAMPLE: NORMAL
SARS-COV-2 RDRP RESP QL NAA+PROBE: NEGATIVE
SODIUM BLD-SCNC: 140 MMOL/L (ref 136–145)
SODIUM SERPL-SCNC: 143 MMOL/L (ref 136–145)
TROPONIN I SERPL DL<=0.01 NG/ML-MCNC: 0.05 NG/ML (ref 0–0.03)
WBC # BLD AUTO: 7.01 K/UL (ref 3.9–12.7)

## 2021-01-19 PROCEDURE — 99220 PR INITIAL OBSERVATION CARE,LEVL III: CPT | Mod: ,,, | Performed by: PHYSICIAN ASSISTANT

## 2021-01-19 PROCEDURE — 83880 ASSAY OF NATRIURETIC PEPTIDE: CPT

## 2021-01-19 PROCEDURE — 99285 EMERGENCY DEPT VISIT HI MDM: CPT | Mod: CS,,, | Performed by: EMERGENCY MEDICINE

## 2021-01-19 PROCEDURE — 99285 PR EMERGENCY DEPT VISIT,LEVEL V: ICD-10-PCS | Mod: CS,,, | Performed by: EMERGENCY MEDICINE

## 2021-01-19 PROCEDURE — 93005 ELECTROCARDIOGRAM TRACING: CPT

## 2021-01-19 PROCEDURE — 96374 THER/PROPH/DIAG INJ IV PUSH: CPT

## 2021-01-19 PROCEDURE — G0378 HOSPITAL OBSERVATION PER HR: HCPCS

## 2021-01-19 PROCEDURE — U0002 COVID-19 LAB TEST NON-CDC: HCPCS | Performed by: PHYSICIAN ASSISTANT

## 2021-01-19 PROCEDURE — 63600175 PHARM REV CODE 636 W HCPCS: Performed by: PHYSICIAN ASSISTANT

## 2021-01-19 PROCEDURE — 93010 ELECTROCARDIOGRAM REPORT: CPT | Mod: ,,, | Performed by: INTERNAL MEDICINE

## 2021-01-19 PROCEDURE — 99285 EMERGENCY DEPT VISIT HI MDM: CPT | Mod: 25

## 2021-01-19 PROCEDURE — 83735 ASSAY OF MAGNESIUM: CPT

## 2021-01-19 PROCEDURE — 85025 COMPLETE CBC W/AUTO DIFF WBC: CPT

## 2021-01-19 PROCEDURE — 80047 BASIC METABLC PNL IONIZED CA: CPT

## 2021-01-19 PROCEDURE — 80053 COMPREHEN METABOLIC PANEL: CPT

## 2021-01-19 PROCEDURE — 93010 EKG 12-LEAD: ICD-10-PCS | Mod: ,,, | Performed by: INTERNAL MEDICINE

## 2021-01-19 PROCEDURE — 84484 ASSAY OF TROPONIN QUANT: CPT

## 2021-01-19 PROCEDURE — 99220 PR INITIAL OBSERVATION CARE,LEVL III: ICD-10-PCS | Mod: ,,, | Performed by: PHYSICIAN ASSISTANT

## 2021-01-19 RX ORDER — FUROSEMIDE 10 MG/ML
40 INJECTION INTRAMUSCULAR; INTRAVENOUS
Status: COMPLETED | OUTPATIENT
Start: 2021-01-19 | End: 2021-01-19

## 2021-01-19 RX ADMIN — FUROSEMIDE 40 MG: 10 INJECTION, SOLUTION INTRAMUSCULAR; INTRAVENOUS at 10:01

## 2021-01-20 PROBLEM — R79.89 ELEVATED TROPONIN: Chronic | Status: ACTIVE | Noted: 2018-06-15

## 2021-01-20 PROBLEM — Z86.79 H/O VENTRICULAR TACHYCARDIA: Chronic | Status: ACTIVE | Noted: 2021-01-20

## 2021-01-20 PROBLEM — I50.23 ACUTE ON CHRONIC SYSTOLIC CONGESTIVE HEART FAILURE: Status: ACTIVE | Noted: 2021-01-19

## 2021-01-20 PROBLEM — I10 ESSENTIAL HYPERTENSION: Chronic | Status: ACTIVE | Noted: 2021-01-20

## 2021-01-20 LAB
ALBUMIN SERPL BCP-MCNC: 4.3 G/DL (ref 3.5–5.2)
ALP SERPL-CCNC: 75 U/L (ref 55–135)
ALT SERPL W/O P-5'-P-CCNC: 29 U/L (ref 10–44)
ANION GAP SERPL CALC-SCNC: 13 MMOL/L (ref 8–16)
ANION GAP SERPL CALC-SCNC: 14 MMOL/L (ref 8–16)
ASCENDING AORTA: 3.77 CM
AST SERPL-CCNC: 26 U/L (ref 10–40)
AV INDEX (PROSTH): 0.65
AV MEAN GRADIENT: 3 MMHG
AV PEAK GRADIENT: 8 MMHG
AV VALVE AREA: 2.13 CM2
AV VELOCITY RATIO: 0.6
BASOPHILS # BLD AUTO: 0.05 K/UL (ref 0–0.2)
BASOPHILS NFR BLD: 0.7 % (ref 0–1.9)
BILIRUB SERPL-MCNC: 0.9 MG/DL (ref 0.1–1)
BSA FOR ECHO PROCEDURE: 2.23 M2
BUN SERPL-MCNC: 15 MG/DL (ref 8–23)
BUN SERPL-MCNC: 21 MG/DL (ref 8–23)
CALCIUM SERPL-MCNC: 9.4 MG/DL (ref 8.7–10.5)
CALCIUM SERPL-MCNC: 9.4 MG/DL (ref 8.7–10.5)
CHLORIDE SERPL-SCNC: 102 MMOL/L (ref 95–110)
CHLORIDE SERPL-SCNC: 104 MMOL/L (ref 95–110)
CHOLEST SERPL-MCNC: 161 MG/DL (ref 120–199)
CHOLEST/HDLC SERPL: 3.2 {RATIO} (ref 2–5)
CO2 SERPL-SCNC: 26 MMOL/L (ref 23–29)
CO2 SERPL-SCNC: 27 MMOL/L (ref 23–29)
CREAT SERPL-MCNC: 1 MG/DL (ref 0.5–1.4)
CREAT SERPL-MCNC: 1.3 MG/DL (ref 0.5–1.4)
CV ECHO LV RWT: 0.28 CM
DIFFERENTIAL METHOD: ABNORMAL
DOP CALC AO PEAK VEL: 1.37 M/S
DOP CALC AO VTI: 23.27 CM
DOP CALC LVOT AREA: 3.3 CM2
DOP CALC LVOT DIAMETER: 2.04 CM
DOP CALC LVOT PEAK VEL: 0.82 M/S
DOP CALC LVOT STROKE VOLUME: 49.56 CM3
DOP CALCLVOT PEAK VEL VTI: 15.17 CM
E WAVE DECELERATION TIME: 177.48 MSEC
E/A RATIO: 0.65
E/E' RATIO: 7.45 M/S
ECHO LV POSTERIOR WALL: 1 CM (ref 0.6–1.1)
EOSINOPHIL # BLD AUTO: 0.2 K/UL (ref 0–0.5)
EOSINOPHIL NFR BLD: 2.5 % (ref 0–8)
ERYTHROCYTE [DISTWIDTH] IN BLOOD BY AUTOMATED COUNT: 13.5 % (ref 11.5–14.5)
EST. GFR  (AFRICAN AMERICAN): >60 ML/MIN/1.73 M^2
EST. GFR  (AFRICAN AMERICAN): >60 ML/MIN/1.73 M^2
EST. GFR  (NON AFRICAN AMERICAN): 56.1 ML/MIN/1.73 M^2
EST. GFR  (NON AFRICAN AMERICAN): >60 ML/MIN/1.73 M^2
ESTIMATED AVG GLUCOSE: 120 MG/DL (ref 68–131)
FRACTIONAL SHORTENING: 17 % (ref 28–44)
FUNGUS SPEC CULT: NORMAL
FUNGUS SPEC CULT: NORMAL
GLUCOSE SERPL-MCNC: 112 MG/DL (ref 70–110)
GLUCOSE SERPL-MCNC: 93 MG/DL (ref 70–110)
HBA1C MFR BLD HPLC: 5.8 % (ref 4–5.6)
HCT VFR BLD AUTO: 49.2 % (ref 40–54)
HDLC SERPL-MCNC: 50 MG/DL (ref 40–75)
HDLC SERPL: 31.1 % (ref 20–50)
HGB BLD-MCNC: 15.3 G/DL (ref 14–18)
IMM GRANULOCYTES # BLD AUTO: 0.02 K/UL (ref 0–0.04)
IMM GRANULOCYTES NFR BLD AUTO: 0.3 % (ref 0–0.5)
INTERVENTRICULAR SEPTUM: 0.9 CM (ref 0.6–1.1)
IVRT: 148.43 MSEC
LA MAJOR: 5.39 CM
LA MINOR: 5.45 CM
LA WIDTH: 5.26 CM
LDLC SERPL CALC-MCNC: 85 MG/DL (ref 63–159)
LEFT ATRIUM SIZE: 3.82 CM
LEFT ATRIUM VOLUME INDEX MOD: 41.4 ML/M2
LEFT ATRIUM VOLUME INDEX: 43 ML/M2
LEFT ATRIUM VOLUME MOD: 89.06 CM3
LEFT ATRIUM VOLUME: 92.57 CM3
LEFT INTERNAL DIMENSION IN SYSTOLE: 6 CM (ref 2.1–4)
LEFT VENTRICLE DIASTOLIC VOLUME INDEX: 116.35 ML/M2
LEFT VENTRICLE DIASTOLIC VOLUME: 250.44 ML
LEFT VENTRICLE MASS INDEX: 147 G/M2
LEFT VENTRICLE SYSTOLIC VOLUME INDEX: 83.7 ML/M2
LEFT VENTRICLE SYSTOLIC VOLUME: 180.24 ML
LEFT VENTRICULAR INTERNAL DIMENSION IN DIASTOLE: 7.2 CM (ref 3.5–6)
LEFT VENTRICULAR MASS: 317.03 G
LV LATERAL E/E' RATIO: 5.86 M/S
LV SEPTAL E/E' RATIO: 10.25 M/S
LYMPHOCYTES # BLD AUTO: 2.5 K/UL (ref 1–4.8)
LYMPHOCYTES NFR BLD: 34.6 % (ref 18–48)
MAGNESIUM SERPL-MCNC: 1.9 MG/DL (ref 1.6–2.6)
MAGNESIUM SERPL-MCNC: 2 MG/DL (ref 1.6–2.6)
MCH RBC QN AUTO: 30.4 PG (ref 27–31)
MCHC RBC AUTO-ENTMCNC: 31.1 G/DL (ref 32–36)
MCV RBC AUTO: 98 FL (ref 82–98)
MONOCYTES # BLD AUTO: 0.6 K/UL (ref 0.3–1)
MONOCYTES NFR BLD: 8.8 % (ref 4–15)
MV A" WAVE DURATION": 22.26 MSEC
MV PEAK A VEL: 0.63 M/S
MV PEAK E VEL: 0.41 M/S
NEUTROPHILS # BLD AUTO: 3.8 K/UL (ref 1.8–7.7)
NEUTROPHILS NFR BLD: 53.1 % (ref 38–73)
NONHDLC SERPL-MCNC: 111 MG/DL
NRBC BLD-RTO: 0 /100 WBC
PHOSPHATE SERPL-MCNC: 3.4 MG/DL (ref 2.7–4.5)
PISA TR MAX VEL: 2.25 M/S
PLATELET # BLD AUTO: 190 K/UL (ref 150–350)
PMV BLD AUTO: 11 FL (ref 9.2–12.9)
POTASSIUM SERPL-SCNC: 3.7 MMOL/L (ref 3.5–5.1)
POTASSIUM SERPL-SCNC: 3.9 MMOL/L (ref 3.5–5.1)
PROT SERPL-MCNC: 7.3 G/DL (ref 6–8.4)
PULM VEIN S/D RATIO: 1.26
PV PEAK D VEL: 0.31 M/S
PV PEAK S VEL: 0.39 M/S
RA MAJOR: 3.65 CM
RA PRESSURE: 3 MMHG
RA WIDTH: 3.27 CM
RBC # BLD AUTO: 5.03 M/UL (ref 4.6–6.2)
RIGHT VENTRICULAR END-DIASTOLIC DIMENSION: 2.96 CM
RV TISSUE DOPPLER FREE WALL SYSTOLIC VELOCITY 1 (APICAL 4 CHAMBER VIEW): 13.67 CM/S
SINUS: 3.66 CM
SODIUM SERPL-SCNC: 142 MMOL/L (ref 136–145)
SODIUM SERPL-SCNC: 144 MMOL/L (ref 136–145)
STJ: 3.39 CM
TDI LATERAL: 0.07 M/S
TDI SEPTAL: 0.04 M/S
TDI: 0.06 M/S
TR MAX PG: 20 MMHG
TRICUSPID ANNULAR PLANE SYSTOLIC EXCURSION: 2.18 CM
TRIGL SERPL-MCNC: 130 MG/DL (ref 30–150)
TROPONIN I SERPL DL<=0.01 NG/ML-MCNC: 0.05 NG/ML (ref 0–0.03)
TROPONIN I SERPL DL<=0.01 NG/ML-MCNC: 0.07 NG/ML (ref 0–0.03)
TV REST PULMONARY ARTERY PRESSURE: 23 MMHG
WBC # BLD AUTO: 7.13 K/UL (ref 3.9–12.7)

## 2021-01-20 PROCEDURE — 80053 COMPREHEN METABOLIC PANEL: CPT

## 2021-01-20 PROCEDURE — 85025 COMPLETE CBC W/AUTO DIFF WBC: CPT

## 2021-01-20 PROCEDURE — 99225 PR SUBSEQUENT OBSERVATION CARE,LEVEL II: CPT | Mod: ,,, | Performed by: INTERNAL MEDICINE

## 2021-01-20 PROCEDURE — 96372 THER/PROPH/DIAG INJ SC/IM: CPT

## 2021-01-20 PROCEDURE — 84100 ASSAY OF PHOSPHORUS: CPT

## 2021-01-20 PROCEDURE — 36415 COLL VENOUS BLD VENIPUNCTURE: CPT

## 2021-01-20 PROCEDURE — 25000003 PHARM REV CODE 250: Performed by: STUDENT IN AN ORGANIZED HEALTH CARE EDUCATION/TRAINING PROGRAM

## 2021-01-20 PROCEDURE — G0378 HOSPITAL OBSERVATION PER HR: HCPCS

## 2021-01-20 PROCEDURE — 25000003 PHARM REV CODE 250: Performed by: INTERNAL MEDICINE

## 2021-01-20 PROCEDURE — 25500020 PHARM REV CODE 255: Performed by: INTERNAL MEDICINE

## 2021-01-20 PROCEDURE — 80048 BASIC METABOLIC PNL TOTAL CA: CPT

## 2021-01-20 PROCEDURE — 83735 ASSAY OF MAGNESIUM: CPT | Mod: 91

## 2021-01-20 PROCEDURE — 96376 TX/PRO/DX INJ SAME DRUG ADON: CPT | Mod: 59

## 2021-01-20 PROCEDURE — 80061 LIPID PANEL: CPT

## 2021-01-20 PROCEDURE — 63600175 PHARM REV CODE 636 W HCPCS: Performed by: PHYSICIAN ASSISTANT

## 2021-01-20 PROCEDURE — 83735 ASSAY OF MAGNESIUM: CPT

## 2021-01-20 PROCEDURE — 99225 PR SUBSEQUENT OBSERVATION CARE,LEVEL II: ICD-10-PCS | Mod: ,,, | Performed by: INTERNAL MEDICINE

## 2021-01-20 PROCEDURE — 84484 ASSAY OF TROPONIN QUANT: CPT

## 2021-01-20 PROCEDURE — 83036 HEMOGLOBIN GLYCOSYLATED A1C: CPT

## 2021-01-20 PROCEDURE — 25000003 PHARM REV CODE 250: Performed by: PHYSICIAN ASSISTANT

## 2021-01-20 RX ORDER — AMOXICILLIN 250 MG
1 CAPSULE ORAL 2 TIMES DAILY
Status: DISCONTINUED | OUTPATIENT
Start: 2021-01-20 | End: 2021-01-22 | Stop reason: HOSPADM

## 2021-01-20 RX ORDER — ASPIRIN 325 MG
325 TABLET, DELAYED RELEASE (ENTERIC COATED) ORAL NIGHTLY
Status: DISCONTINUED | OUTPATIENT
Start: 2021-01-20 | End: 2021-01-22 | Stop reason: HOSPADM

## 2021-01-20 RX ORDER — FUROSEMIDE 10 MG/ML
40 INJECTION INTRAMUSCULAR; INTRAVENOUS 2 TIMES DAILY
Status: DISCONTINUED | OUTPATIENT
Start: 2021-01-20 | End: 2021-01-21

## 2021-01-20 RX ORDER — ATORVASTATIN CALCIUM 20 MG/1
80 TABLET, FILM COATED ORAL NIGHTLY
Status: DISCONTINUED | OUTPATIENT
Start: 2021-01-20 | End: 2021-01-22 | Stop reason: HOSPADM

## 2021-01-20 RX ORDER — ONDANSETRON 8 MG/1
8 TABLET, ORALLY DISINTEGRATING ORAL EVERY 8 HOURS PRN
Status: DISCONTINUED | OUTPATIENT
Start: 2021-01-20 | End: 2021-01-22 | Stop reason: HOSPADM

## 2021-01-20 RX ORDER — TALC
6 POWDER (GRAM) TOPICAL NIGHTLY PRN
Status: DISCONTINUED | OUTPATIENT
Start: 2021-01-20 | End: 2021-01-22 | Stop reason: HOSPADM

## 2021-01-20 RX ORDER — IBUPROFEN 200 MG
24 TABLET ORAL
Status: DISCONTINUED | OUTPATIENT
Start: 2021-01-20 | End: 2021-01-22 | Stop reason: HOSPADM

## 2021-01-20 RX ORDER — ACETAMINOPHEN 325 MG/1
650 TABLET ORAL EVERY 4 HOURS PRN
Status: DISCONTINUED | OUTPATIENT
Start: 2021-01-20 | End: 2021-01-22 | Stop reason: HOSPADM

## 2021-01-20 RX ORDER — IBUPROFEN 200 MG
16 TABLET ORAL
Status: DISCONTINUED | OUTPATIENT
Start: 2021-01-20 | End: 2021-01-22 | Stop reason: HOSPADM

## 2021-01-20 RX ORDER — GLUCAGON 1 MG
1 KIT INJECTION
Status: DISCONTINUED | OUTPATIENT
Start: 2021-01-20 | End: 2021-01-22 | Stop reason: HOSPADM

## 2021-01-20 RX ORDER — CLOPIDOGREL BISULFATE 75 MG/1
75 TABLET ORAL DAILY
Status: DISCONTINUED | OUTPATIENT
Start: 2021-01-20 | End: 2021-01-22 | Stop reason: HOSPADM

## 2021-01-20 RX ORDER — IPRATROPIUM BROMIDE AND ALBUTEROL SULFATE 2.5; .5 MG/3ML; MG/3ML
3 SOLUTION RESPIRATORY (INHALATION) EVERY 4 HOURS PRN
Status: DISCONTINUED | OUTPATIENT
Start: 2021-01-20 | End: 2021-01-22 | Stop reason: HOSPADM

## 2021-01-20 RX ORDER — PROMETHAZINE HYDROCHLORIDE 25 MG/1
25 TABLET ORAL EVERY 6 HOURS PRN
Status: DISCONTINUED | OUTPATIENT
Start: 2021-01-20 | End: 2021-01-22 | Stop reason: HOSPADM

## 2021-01-20 RX ORDER — PANTOPRAZOLE SODIUM 40 MG/1
40 TABLET, DELAYED RELEASE ORAL DAILY
Status: DISCONTINUED | OUTPATIENT
Start: 2021-01-20 | End: 2021-01-22 | Stop reason: HOSPADM

## 2021-01-20 RX ORDER — BENZONATATE 100 MG/1
100 CAPSULE ORAL 3 TIMES DAILY PRN
Status: DISCONTINUED | OUTPATIENT
Start: 2021-01-20 | End: 2021-01-22 | Stop reason: HOSPADM

## 2021-01-20 RX ORDER — COLCHICINE 0.6 MG/1
0.6 TABLET, FILM COATED ORAL DAILY
Status: DISCONTINUED | OUTPATIENT
Start: 2021-01-20 | End: 2021-01-22 | Stop reason: HOSPADM

## 2021-01-20 RX ORDER — POTASSIUM CHLORIDE 20 MEQ/1
40 TABLET, EXTENDED RELEASE ORAL ONCE
Status: COMPLETED | OUTPATIENT
Start: 2021-01-20 | End: 2021-01-20

## 2021-01-20 RX ORDER — ENOXAPARIN SODIUM 100 MG/ML
40 INJECTION SUBCUTANEOUS EVERY 24 HOURS
Status: DISCONTINUED | OUTPATIENT
Start: 2021-01-20 | End: 2021-01-22 | Stop reason: HOSPADM

## 2021-01-20 RX ORDER — HYDROCODONE BITARTRATE AND ACETAMINOPHEN 10; 325 MG/1; MG/1
1 TABLET ORAL EVERY 6 HOURS PRN
Status: DISCONTINUED | OUTPATIENT
Start: 2021-01-20 | End: 2021-01-22 | Stop reason: HOSPADM

## 2021-01-20 RX ORDER — METOPROLOL SUCCINATE 100 MG/1
100 TABLET, EXTENDED RELEASE ORAL DAILY
Status: DISCONTINUED | OUTPATIENT
Start: 2021-01-20 | End: 2021-01-22 | Stop reason: HOSPADM

## 2021-01-20 RX ORDER — SODIUM CHLORIDE 0.9 % (FLUSH) 0.9 %
5 SYRINGE (ML) INJECTION
Status: DISCONTINUED | OUTPATIENT
Start: 2021-01-20 | End: 2021-01-22 | Stop reason: HOSPADM

## 2021-01-20 RX ORDER — ALLOPURINOL 100 MG/1
100 TABLET ORAL 2 TIMES DAILY
Status: DISCONTINUED | OUTPATIENT
Start: 2021-01-20 | End: 2021-01-22 | Stop reason: HOSPADM

## 2021-01-20 RX ORDER — LISINOPRIL 20 MG/1
20 TABLET ORAL DAILY
Status: DISCONTINUED | OUTPATIENT
Start: 2021-01-20 | End: 2021-01-22 | Stop reason: HOSPADM

## 2021-01-20 RX ADMIN — FUROSEMIDE 40 MG: 10 INJECTION, SOLUTION INTRAMUSCULAR; INTRAVENOUS at 08:01

## 2021-01-20 RX ADMIN — DOCUSATE SODIUM 50MG AND SENNOSIDES 8.6MG 1 TABLET: 8.6; 5 TABLET, FILM COATED ORAL at 09:01

## 2021-01-20 RX ADMIN — CLOPIDOGREL 75 MG: 75 TABLET, FILM COATED ORAL at 09:01

## 2021-01-20 RX ADMIN — COLCHICINE 0.6 MG: 0.6 TABLET, FILM COATED ORAL at 09:01

## 2021-01-20 RX ADMIN — AMIODARONE HYDROCHLORIDE 300 MG: 200 TABLET ORAL at 08:01

## 2021-01-20 RX ADMIN — FUROSEMIDE 40 MG: 10 INJECTION, SOLUTION INTRAMUSCULAR; INTRAVENOUS at 02:01

## 2021-01-20 RX ADMIN — MELATONIN TAB 3 MG 6 MG: 3 TAB at 08:01

## 2021-01-20 RX ADMIN — ALLOPURINOL 100 MG: 100 TABLET ORAL at 09:01

## 2021-01-20 RX ADMIN — POTASSIUM CHLORIDE 40 MEQ: 1500 TABLET, EXTENDED RELEASE ORAL at 09:01

## 2021-01-20 RX ADMIN — HYDROCODONE BITARTRATE AND ACETAMINOPHEN 1 TABLET: 10; 325 TABLET ORAL at 08:01

## 2021-01-20 RX ADMIN — HUMAN ALBUMIN MICROSPHERES AND PERFLUTREN 0.11 MG: 10; .22 INJECTION, SOLUTION INTRAVENOUS at 04:01

## 2021-01-20 RX ADMIN — ENOXAPARIN SODIUM 40 MG: 40 INJECTION SUBCUTANEOUS at 05:01

## 2021-01-20 RX ADMIN — ALLOPURINOL 100 MG: 100 TABLET ORAL at 08:01

## 2021-01-20 RX ADMIN — METOPROLOL SUCCINATE 100 MG: 100 TABLET, EXTENDED RELEASE ORAL at 09:01

## 2021-01-20 RX ADMIN — ATORVASTATIN CALCIUM 80 MG: 20 TABLET, FILM COATED ORAL at 02:01

## 2021-01-20 RX ADMIN — ASPIRIN 325 MG: 325 TABLET, COATED ORAL at 02:01

## 2021-01-20 RX ADMIN — LISINOPRIL 20 MG: 20 TABLET ORAL at 09:01

## 2021-01-20 RX ADMIN — ASPIRIN 325 MG: 325 TABLET, COATED ORAL at 08:01

## 2021-01-20 RX ADMIN — PANTOPRAZOLE SODIUM 40 MG: 40 TABLET, DELAYED RELEASE ORAL at 09:01

## 2021-01-20 RX ADMIN — AMIODARONE HYDROCHLORIDE 300 MG: 200 TABLET ORAL at 02:01

## 2021-01-20 RX ADMIN — ATORVASTATIN CALCIUM 80 MG: 20 TABLET, FILM COATED ORAL at 08:01

## 2021-01-20 RX ADMIN — BENZONATATE 100 MG: 100 CAPSULE ORAL at 03:01

## 2021-01-20 RX ADMIN — FUROSEMIDE 40 MG: 10 INJECTION, SOLUTION INTRAMUSCULAR; INTRAVENOUS at 09:01

## 2021-01-20 RX ADMIN — DOCUSATE SODIUM 50MG AND SENNOSIDES 8.6MG 1 TABLET: 8.6; 5 TABLET, FILM COATED ORAL at 08:01

## 2021-01-21 LAB
ALBUMIN SERPL BCP-MCNC: 4.1 G/DL (ref 3.5–5.2)
ALP SERPL-CCNC: 77 U/L (ref 55–135)
ALT SERPL W/O P-5'-P-CCNC: 26 U/L (ref 10–44)
ANION GAP SERPL CALC-SCNC: 11 MMOL/L (ref 8–16)
AST SERPL-CCNC: 24 U/L (ref 10–40)
BASOPHILS # BLD AUTO: 0.07 K/UL (ref 0–0.2)
BASOPHILS NFR BLD: 0.9 % (ref 0–1.9)
BILIRUB SERPL-MCNC: 0.8 MG/DL (ref 0.1–1)
BUN SERPL-MCNC: 29 MG/DL (ref 8–23)
CALCIUM SERPL-MCNC: 9.2 MG/DL (ref 8.7–10.5)
CHLORIDE SERPL-SCNC: 102 MMOL/L (ref 95–110)
CO2 SERPL-SCNC: 29 MMOL/L (ref 23–29)
CREAT SERPL-MCNC: 1.5 MG/DL (ref 0.5–1.4)
DIFFERENTIAL METHOD: NORMAL
EOSINOPHIL # BLD AUTO: 0.2 K/UL (ref 0–0.5)
EOSINOPHIL NFR BLD: 2.1 % (ref 0–8)
ERYTHROCYTE [DISTWIDTH] IN BLOOD BY AUTOMATED COUNT: 13.3 % (ref 11.5–14.5)
EST. GFR  (AFRICAN AMERICAN): 54.5 ML/MIN/1.73 M^2
EST. GFR  (NON AFRICAN AMERICAN): 47.2 ML/MIN/1.73 M^2
GLUCOSE SERPL-MCNC: 104 MG/DL (ref 70–110)
HCT VFR BLD AUTO: 49.7 % (ref 40–54)
HGB BLD-MCNC: 15.9 G/DL (ref 14–18)
IMM GRANULOCYTES # BLD AUTO: 0.02 K/UL (ref 0–0.04)
IMM GRANULOCYTES NFR BLD AUTO: 0.3 % (ref 0–0.5)
LYMPHOCYTES # BLD AUTO: 2.4 K/UL (ref 1–4.8)
LYMPHOCYTES NFR BLD: 30.8 % (ref 18–48)
MAGNESIUM SERPL-MCNC: 2 MG/DL (ref 1.6–2.6)
MCH RBC QN AUTO: 30.4 PG (ref 27–31)
MCHC RBC AUTO-ENTMCNC: 32 G/DL (ref 32–36)
MCV RBC AUTO: 95 FL (ref 82–98)
MONOCYTES # BLD AUTO: 0.7 K/UL (ref 0.3–1)
MONOCYTES NFR BLD: 9.2 % (ref 4–15)
NEUTROPHILS # BLD AUTO: 4.4 K/UL (ref 1.8–7.7)
NEUTROPHILS NFR BLD: 56.7 % (ref 38–73)
NRBC BLD-RTO: 0 /100 WBC
PLATELET # BLD AUTO: 188 K/UL (ref 150–350)
PMV BLD AUTO: 11.1 FL (ref 9.2–12.9)
POCT GLUCOSE: 105 MG/DL (ref 70–110)
POTASSIUM SERPL-SCNC: 3.6 MMOL/L (ref 3.5–5.1)
PROT SERPL-MCNC: 7 G/DL (ref 6–8.4)
RBC # BLD AUTO: 5.23 M/UL (ref 4.6–6.2)
SODIUM SERPL-SCNC: 142 MMOL/L (ref 136–145)
WBC # BLD AUTO: 7.73 K/UL (ref 3.9–12.7)

## 2021-01-21 PROCEDURE — 80053 COMPREHEN METABOLIC PANEL: CPT

## 2021-01-21 PROCEDURE — 85025 COMPLETE CBC W/AUTO DIFF WBC: CPT

## 2021-01-21 PROCEDURE — 99225 PR SUBSEQUENT OBSERVATION CARE,LEVEL II: CPT | Mod: ,,, | Performed by: INTERNAL MEDICINE

## 2021-01-21 PROCEDURE — G0378 HOSPITAL OBSERVATION PER HR: HCPCS

## 2021-01-21 PROCEDURE — 36415 COLL VENOUS BLD VENIPUNCTURE: CPT

## 2021-01-21 PROCEDURE — 99225 PR SUBSEQUENT OBSERVATION CARE,LEVEL II: ICD-10-PCS | Mod: ,,, | Performed by: INTERNAL MEDICINE

## 2021-01-21 PROCEDURE — 25000003 PHARM REV CODE 250: Performed by: PHYSICIAN ASSISTANT

## 2021-01-21 PROCEDURE — 83735 ASSAY OF MAGNESIUM: CPT

## 2021-01-21 PROCEDURE — 96372 THER/PROPH/DIAG INJ SC/IM: CPT

## 2021-01-21 PROCEDURE — 63600175 PHARM REV CODE 636 W HCPCS: Performed by: PHYSICIAN ASSISTANT

## 2021-01-21 PROCEDURE — 25000003 PHARM REV CODE 250: Performed by: INTERNAL MEDICINE

## 2021-01-21 RX ORDER — FUROSEMIDE 40 MG/1
40 TABLET ORAL 2 TIMES DAILY
Status: DISCONTINUED | OUTPATIENT
Start: 2021-01-21 | End: 2021-01-21

## 2021-01-21 RX ORDER — FUROSEMIDE 40 MG/1
40 TABLET ORAL 2 TIMES DAILY
Status: DISCONTINUED | OUTPATIENT
Start: 2021-01-21 | End: 2021-01-22 | Stop reason: HOSPADM

## 2021-01-21 RX ADMIN — DOCUSATE SODIUM 50MG AND SENNOSIDES 8.6MG 1 TABLET: 8.6; 5 TABLET, FILM COATED ORAL at 09:01

## 2021-01-21 RX ADMIN — HYDROCODONE BITARTRATE AND ACETAMINOPHEN 1 TABLET: 10; 325 TABLET ORAL at 02:01

## 2021-01-21 RX ADMIN — CLOPIDOGREL 75 MG: 75 TABLET, FILM COATED ORAL at 09:01

## 2021-01-21 RX ADMIN — ALLOPURINOL 100 MG: 100 TABLET ORAL at 09:01

## 2021-01-21 RX ADMIN — DOCUSATE SODIUM 50MG AND SENNOSIDES 8.6MG 1 TABLET: 8.6; 5 TABLET, FILM COATED ORAL at 08:01

## 2021-01-21 RX ADMIN — ATORVASTATIN CALCIUM 80 MG: 20 TABLET, FILM COATED ORAL at 08:01

## 2021-01-21 RX ADMIN — FUROSEMIDE 40 MG: 40 TABLET ORAL at 02:01

## 2021-01-21 RX ADMIN — PANTOPRAZOLE SODIUM 40 MG: 40 TABLET, DELAYED RELEASE ORAL at 09:01

## 2021-01-21 RX ADMIN — LISINOPRIL 20 MG: 20 TABLET ORAL at 09:01

## 2021-01-21 RX ADMIN — BENZONATATE 100 MG: 100 CAPSULE ORAL at 02:01

## 2021-01-21 RX ADMIN — METOPROLOL SUCCINATE 100 MG: 100 TABLET, EXTENDED RELEASE ORAL at 09:01

## 2021-01-21 RX ADMIN — ENOXAPARIN SODIUM 40 MG: 40 INJECTION SUBCUTANEOUS at 06:01

## 2021-01-21 RX ADMIN — COLCHICINE 0.6 MG: 0.6 TABLET, FILM COATED ORAL at 09:01

## 2021-01-21 RX ADMIN — AMIODARONE HYDROCHLORIDE 300 MG: 200 TABLET ORAL at 08:01

## 2021-01-21 RX ADMIN — ASPIRIN 325 MG: 325 TABLET, COATED ORAL at 08:01

## 2021-01-22 VITALS
RESPIRATION RATE: 16 BRPM | DIASTOLIC BLOOD PRESSURE: 63 MMHG | BODY MASS INDEX: 36.34 KG/M2 | HEART RATE: 60 BPM | HEIGHT: 67 IN | WEIGHT: 231.5 LBS | TEMPERATURE: 97 F | OXYGEN SATURATION: 95 % | SYSTOLIC BLOOD PRESSURE: 113 MMHG

## 2021-01-22 PROBLEM — N17.9 AKI (ACUTE KIDNEY INJURY): Status: ACTIVE | Noted: 2021-01-22

## 2021-01-22 LAB
ALBUMIN SERPL BCP-MCNC: 3.9 G/DL (ref 3.5–5.2)
ALP SERPL-CCNC: 78 U/L (ref 55–135)
ALT SERPL W/O P-5'-P-CCNC: 30 U/L (ref 10–44)
ANION GAP SERPL CALC-SCNC: 16 MMOL/L (ref 8–16)
AST SERPL-CCNC: 30 U/L (ref 10–40)
BASOPHILS # BLD AUTO: 0.06 K/UL (ref 0–0.2)
BASOPHILS NFR BLD: 0.8 % (ref 0–1.9)
BILIRUB SERPL-MCNC: 0.5 MG/DL (ref 0.1–1)
BUN SERPL-MCNC: 36 MG/DL (ref 8–23)
CALCIUM SERPL-MCNC: 8.8 MG/DL (ref 8.7–10.5)
CHLORIDE SERPL-SCNC: 102 MMOL/L (ref 95–110)
CO2 SERPL-SCNC: 26 MMOL/L (ref 23–29)
CREAT SERPL-MCNC: 1.6 MG/DL (ref 0.5–1.4)
DIFFERENTIAL METHOD: ABNORMAL
EOSINOPHIL # BLD AUTO: 0.2 K/UL (ref 0–0.5)
EOSINOPHIL NFR BLD: 1.9 % (ref 0–8)
ERYTHROCYTE [DISTWIDTH] IN BLOOD BY AUTOMATED COUNT: 13.3 % (ref 11.5–14.5)
EST. GFR  (AFRICAN AMERICAN): 50.4 ML/MIN/1.73 M^2
EST. GFR  (NON AFRICAN AMERICAN): 43.6 ML/MIN/1.73 M^2
GLUCOSE SERPL-MCNC: 86 MG/DL (ref 70–110)
HCT VFR BLD AUTO: 48.6 % (ref 40–54)
HGB BLD-MCNC: 15.5 G/DL (ref 14–18)
IMM GRANULOCYTES # BLD AUTO: 0.02 K/UL (ref 0–0.04)
IMM GRANULOCYTES NFR BLD AUTO: 0.3 % (ref 0–0.5)
LYMPHOCYTES # BLD AUTO: 2.5 K/UL (ref 1–4.8)
LYMPHOCYTES NFR BLD: 31.7 % (ref 18–48)
MAGNESIUM SERPL-MCNC: 2.1 MG/DL (ref 1.6–2.6)
MCH RBC QN AUTO: 30.3 PG (ref 27–31)
MCHC RBC AUTO-ENTMCNC: 31.9 G/DL (ref 32–36)
MCV RBC AUTO: 95 FL (ref 82–98)
MONOCYTES # BLD AUTO: 0.8 K/UL (ref 0.3–1)
MONOCYTES NFR BLD: 9.9 % (ref 4–15)
NEUTROPHILS # BLD AUTO: 4.3 K/UL (ref 1.8–7.7)
NEUTROPHILS NFR BLD: 55.4 % (ref 38–73)
NRBC BLD-RTO: 0 /100 WBC
PLATELET # BLD AUTO: 172 K/UL (ref 150–350)
PMV BLD AUTO: 11.4 FL (ref 9.2–12.9)
POTASSIUM SERPL-SCNC: 3.4 MMOL/L (ref 3.5–5.1)
PROT SERPL-MCNC: 6.6 G/DL (ref 6–8.4)
RBC # BLD AUTO: 5.11 M/UL (ref 4.6–6.2)
SODIUM SERPL-SCNC: 144 MMOL/L (ref 136–145)
WBC # BLD AUTO: 7.76 K/UL (ref 3.9–12.7)

## 2021-01-22 PROCEDURE — 36415 COLL VENOUS BLD VENIPUNCTURE: CPT

## 2021-01-22 PROCEDURE — 25000003 PHARM REV CODE 250: Performed by: INTERNAL MEDICINE

## 2021-01-22 PROCEDURE — 99217 PR OBSERVATION CARE DISCHARGE: ICD-10-PCS | Mod: GC,,, | Performed by: INTERNAL MEDICINE

## 2021-01-22 PROCEDURE — G0378 HOSPITAL OBSERVATION PER HR: HCPCS

## 2021-01-22 PROCEDURE — 80053 COMPREHEN METABOLIC PANEL: CPT

## 2021-01-22 PROCEDURE — 83735 ASSAY OF MAGNESIUM: CPT

## 2021-01-22 PROCEDURE — 25000003 PHARM REV CODE 250: Performed by: PHYSICIAN ASSISTANT

## 2021-01-22 PROCEDURE — 85025 COMPLETE CBC W/AUTO DIFF WBC: CPT

## 2021-01-22 PROCEDURE — 99217 PR OBSERVATION CARE DISCHARGE: CPT | Mod: GC,,, | Performed by: INTERNAL MEDICINE

## 2021-01-22 RX ORDER — POTASSIUM CHLORIDE 750 MG/1
30 CAPSULE, EXTENDED RELEASE ORAL EVERY 4 HOURS
Status: COMPLETED | OUTPATIENT
Start: 2021-01-22 | End: 2021-01-22

## 2021-01-22 RX ORDER — FUROSEMIDE 40 MG/1
TABLET ORAL
Qty: 180 TABLET | Refills: 3 | Status: ON HOLD
Start: 2021-01-22 | End: 2021-01-01 | Stop reason: SDUPTHER

## 2021-01-22 RX ADMIN — POTASSIUM CHLORIDE 30 MEQ: 10 CAPSULE, COATED, EXTENDED RELEASE ORAL at 12:01

## 2021-01-22 RX ADMIN — DOCUSATE SODIUM 50MG AND SENNOSIDES 8.6MG 1 TABLET: 8.6; 5 TABLET, FILM COATED ORAL at 09:01

## 2021-01-22 RX ADMIN — PANTOPRAZOLE SODIUM 40 MG: 40 TABLET, DELAYED RELEASE ORAL at 09:01

## 2021-01-22 RX ADMIN — FUROSEMIDE 40 MG: 40 TABLET ORAL at 09:01

## 2021-01-22 RX ADMIN — POTASSIUM CHLORIDE 30 MEQ: 10 CAPSULE, COATED, EXTENDED RELEASE ORAL at 09:01

## 2021-01-22 RX ADMIN — ALLOPURINOL 100 MG: 100 TABLET ORAL at 09:01

## 2021-01-22 RX ADMIN — COLCHICINE 0.6 MG: 0.6 TABLET, FILM COATED ORAL at 09:01

## 2021-01-22 RX ADMIN — METOPROLOL SUCCINATE 100 MG: 100 TABLET, EXTENDED RELEASE ORAL at 09:01

## 2021-01-22 RX ADMIN — CLOPIDOGREL 75 MG: 75 TABLET, FILM COATED ORAL at 09:01

## 2021-01-22 RX ADMIN — LISINOPRIL 20 MG: 20 TABLET ORAL at 09:01

## 2021-02-01 ENCOUNTER — DOCUMENTATION ONLY (OUTPATIENT)
Dept: ORTHOPEDICS | Facility: CLINIC | Age: 71
End: 2021-02-01

## 2021-02-02 ENCOUNTER — CLINICAL SUPPORT (OUTPATIENT)
Dept: REHABILITATION | Facility: HOSPITAL | Age: 71
End: 2021-02-02
Payer: MEDICARE

## 2021-02-02 DIAGNOSIS — M79.645 FINGER PAIN, LEFT: ICD-10-CM

## 2021-02-02 DIAGNOSIS — R29.898 DECREASED GRIP STRENGTH OF LEFT HAND: ICD-10-CM

## 2021-02-02 DIAGNOSIS — R29.818 FINE MOTOR IMPAIRMENT: ICD-10-CM

## 2021-02-02 DIAGNOSIS — M25.642 DECREASED RANGE OF MOTION OF FINGER OF LEFT HAND: ICD-10-CM

## 2021-02-02 DIAGNOSIS — Z98.890 POST-OPERATIVE STATE: ICD-10-CM

## 2021-02-02 DIAGNOSIS — R29.898 FINE MOTOR IMPAIRMENT: ICD-10-CM

## 2021-02-02 PROCEDURE — 97165 OT EVAL LOW COMPLEX 30 MIN: CPT | Mod: PN

## 2021-02-03 DIAGNOSIS — Z12.11 COLON CANCER SCREENING: ICD-10-CM

## 2021-02-04 ENCOUNTER — LAB VISIT (OUTPATIENT)
Dept: LAB | Facility: HOSPITAL | Age: 71
End: 2021-02-04
Attending: INTERNAL MEDICINE
Payer: MEDICARE

## 2021-02-04 ENCOUNTER — OFFICE VISIT (OUTPATIENT)
Dept: INTERNAL MEDICINE | Facility: CLINIC | Age: 71
End: 2021-02-04
Payer: MEDICARE

## 2021-02-04 VITALS
SYSTOLIC BLOOD PRESSURE: 120 MMHG | HEIGHT: 67 IN | WEIGHT: 238.31 LBS | OXYGEN SATURATION: 95 % | BODY MASS INDEX: 37.4 KG/M2 | DIASTOLIC BLOOD PRESSURE: 70 MMHG | HEART RATE: 60 BPM

## 2021-02-04 DIAGNOSIS — I25.10 CORONARY ARTERY DISEASE INVOLVING NATIVE CORONARY ARTERY OF NATIVE HEART WITHOUT ANGINA PECTORIS: Chronic | ICD-10-CM

## 2021-02-04 DIAGNOSIS — Z79.899 LONG TERM CURRENT USE OF AMIODARONE: ICD-10-CM

## 2021-02-04 DIAGNOSIS — Z86.718 HISTORY OF DVT (DEEP VEIN THROMBOSIS): Chronic | ICD-10-CM

## 2021-02-04 DIAGNOSIS — I70.0 ATHEROSCLEROSIS OF AORTA: ICD-10-CM

## 2021-02-04 DIAGNOSIS — I10 ESSENTIAL HYPERTENSION: Chronic | ICD-10-CM

## 2021-02-04 DIAGNOSIS — M1A.09X0 CHRONIC GOUT OF MULTIPLE SITES, UNSPECIFIED CAUSE: ICD-10-CM

## 2021-02-04 DIAGNOSIS — E66.01 SEVERE OBESITY (BMI 35.0-39.9) WITH COMORBIDITY: ICD-10-CM

## 2021-02-04 DIAGNOSIS — I50.42 CHRONIC COMBINED SYSTOLIC AND DIASTOLIC CONGESTIVE HEART FAILURE: Primary | ICD-10-CM

## 2021-02-04 DIAGNOSIS — I25.5 CARDIOMYOPATHY, ISCHEMIC: Chronic | ICD-10-CM

## 2021-02-04 PROBLEM — M79.645 FINGER PAIN, LEFT: Status: RESOLVED | Noted: 2021-02-02 | Resolved: 2021-02-04

## 2021-02-04 PROBLEM — L03.114 CELLULITIS OF LEFT HAND: Status: RESOLVED | Noted: 2020-12-18 | Resolved: 2021-02-04

## 2021-02-04 PROBLEM — M10.9 ACUTE GOUT OF LEFT HAND: Status: RESOLVED | Noted: 2020-12-20 | Resolved: 2021-02-04

## 2021-02-04 PROCEDURE — 3074F PR MOST RECENT SYSTOLIC BLOOD PRESSURE < 130 MM HG: ICD-10-PCS | Mod: CPTII,S$GLB,, | Performed by: INTERNAL MEDICINE

## 2021-02-04 PROCEDURE — 99214 PR OFFICE/OUTPT VISIT, EST, LEVL IV, 30-39 MIN: ICD-10-PCS | Mod: S$GLB,,, | Performed by: INTERNAL MEDICINE

## 2021-02-04 PROCEDURE — 3288F PR FALLS RISK ASSESSMENT DOCUMENTED: ICD-10-PCS | Mod: CPTII,S$GLB,, | Performed by: INTERNAL MEDICINE

## 2021-02-04 PROCEDURE — 1126F PR PAIN SEVERITY QUANTIFIED, NO PAIN PRESENT: ICD-10-PCS | Mod: S$GLB,,, | Performed by: INTERNAL MEDICINE

## 2021-02-04 PROCEDURE — 3008F BODY MASS INDEX DOCD: CPT | Mod: CPTII,S$GLB,, | Performed by: INTERNAL MEDICINE

## 2021-02-04 PROCEDURE — 3074F SYST BP LT 130 MM HG: CPT | Mod: CPTII,S$GLB,, | Performed by: INTERNAL MEDICINE

## 2021-02-04 PROCEDURE — 3078F DIAST BP <80 MM HG: CPT | Mod: CPTII,S$GLB,, | Performed by: INTERNAL MEDICINE

## 2021-02-04 PROCEDURE — 99214 OFFICE O/P EST MOD 30 MIN: CPT | Mod: S$GLB,,, | Performed by: INTERNAL MEDICINE

## 2021-02-04 PROCEDURE — 99999 PR PBB SHADOW E&M-EST. PATIENT-LVL III: ICD-10-PCS | Mod: PBBFAC,,, | Performed by: INTERNAL MEDICINE

## 2021-02-04 PROCEDURE — 1101F PT FALLS ASSESS-DOCD LE1/YR: CPT | Mod: CPTII,S$GLB,, | Performed by: INTERNAL MEDICINE

## 2021-02-04 PROCEDURE — 80053 COMPREHEN METABOLIC PANEL: CPT

## 2021-02-04 PROCEDURE — 3008F PR BODY MASS INDEX (BMI) DOCUMENTED: ICD-10-PCS | Mod: CPTII,S$GLB,, | Performed by: INTERNAL MEDICINE

## 2021-02-04 PROCEDURE — 99499 RISK ADDL DX/OHS AUDIT: ICD-10-PCS | Mod: S$GLB,,, | Performed by: INTERNAL MEDICINE

## 2021-02-04 PROCEDURE — 1159F MED LIST DOCD IN RCRD: CPT | Mod: S$GLB,,, | Performed by: INTERNAL MEDICINE

## 2021-02-04 PROCEDURE — 1159F PR MEDICATION LIST DOCUMENTED IN MEDICAL RECORD: ICD-10-PCS | Mod: S$GLB,,, | Performed by: INTERNAL MEDICINE

## 2021-02-04 PROCEDURE — 99499 UNLISTED E&M SERVICE: CPT | Mod: S$GLB,,, | Performed by: INTERNAL MEDICINE

## 2021-02-04 PROCEDURE — 1126F AMNT PAIN NOTED NONE PRSNT: CPT | Mod: S$GLB,,, | Performed by: INTERNAL MEDICINE

## 2021-02-04 PROCEDURE — 1101F PR PT FALLS ASSESS DOC 0-1 FALLS W/OUT INJ PAST YR: ICD-10-PCS | Mod: CPTII,S$GLB,, | Performed by: INTERNAL MEDICINE

## 2021-02-04 PROCEDURE — 36415 COLL VENOUS BLD VENIPUNCTURE: CPT

## 2021-02-04 PROCEDURE — 84443 ASSAY THYROID STIM HORMONE: CPT

## 2021-02-04 PROCEDURE — 3078F PR MOST RECENT DIASTOLIC BLOOD PRESSURE < 80 MM HG: ICD-10-PCS | Mod: CPTII,S$GLB,, | Performed by: INTERNAL MEDICINE

## 2021-02-04 PROCEDURE — 99999 PR PBB SHADOW E&M-EST. PATIENT-LVL III: CPT | Mod: PBBFAC,,, | Performed by: INTERNAL MEDICINE

## 2021-02-04 PROCEDURE — 3288F FALL RISK ASSESSMENT DOCD: CPT | Mod: CPTII,S$GLB,, | Performed by: INTERNAL MEDICINE

## 2021-02-04 RX ORDER — COLCHICINE 0.6 MG/1
0.6 TABLET ORAL DAILY
Qty: 90 TABLET | Refills: 3 | Status: SHIPPED | OUTPATIENT
Start: 2021-02-04 | End: 2021-01-01 | Stop reason: SDUPTHER

## 2021-02-04 RX ORDER — LISINOPRIL 20 MG/1
20 TABLET ORAL NIGHTLY
Qty: 90 TABLET | Refills: 3 | Status: ON HOLD | OUTPATIENT
Start: 2021-02-04 | End: 2021-06-15 | Stop reason: HOSPADM

## 2021-02-04 RX ORDER — CLOPIDOGREL BISULFATE 75 MG/1
75 TABLET ORAL DAILY
Qty: 90 TABLET | Refills: 3 | Status: SHIPPED | OUTPATIENT
Start: 2021-02-04 | End: 2022-01-01

## 2021-02-05 LAB
ALBUMIN SERPL BCP-MCNC: 4 G/DL (ref 3.5–5.2)
ALP SERPL-CCNC: 72 U/L (ref 55–135)
ALT SERPL W/O P-5'-P-CCNC: 37 U/L (ref 10–44)
ANION GAP SERPL CALC-SCNC: 9 MMOL/L (ref 8–16)
AST SERPL-CCNC: 36 U/L (ref 10–40)
BILIRUB SERPL-MCNC: 0.7 MG/DL (ref 0.1–1)
BUN SERPL-MCNC: 21 MG/DL (ref 8–23)
CALCIUM SERPL-MCNC: 8.5 MG/DL (ref 8.7–10.5)
CHLORIDE SERPL-SCNC: 108 MMOL/L (ref 95–110)
CO2 SERPL-SCNC: 26 MMOL/L (ref 23–29)
CREAT SERPL-MCNC: 1 MG/DL (ref 0.5–1.4)
EST. GFR  (AFRICAN AMERICAN): >60 ML/MIN/1.73 M^2
EST. GFR  (NON AFRICAN AMERICAN): >60 ML/MIN/1.73 M^2
GLUCOSE SERPL-MCNC: 96 MG/DL (ref 70–110)
POTASSIUM SERPL-SCNC: 4.4 MMOL/L (ref 3.5–5.1)
PROT SERPL-MCNC: 6.6 G/DL (ref 6–8.4)
SODIUM SERPL-SCNC: 143 MMOL/L (ref 136–145)
TSH SERPL DL<=0.005 MIU/L-ACNC: 2.17 UIU/ML (ref 0.4–4)

## 2021-02-10 ENCOUNTER — DOCUMENTATION ONLY (OUTPATIENT)
Dept: REHABILITATION | Facility: HOSPITAL | Age: 71
End: 2021-02-10

## 2021-02-10 DIAGNOSIS — R29.898 FINE MOTOR IMPAIRMENT: ICD-10-CM

## 2021-02-10 DIAGNOSIS — M25.642 DECREASED RANGE OF MOTION OF FINGER OF LEFT HAND: Primary | ICD-10-CM

## 2021-02-10 DIAGNOSIS — R29.818 FINE MOTOR IMPAIRMENT: ICD-10-CM

## 2021-02-10 DIAGNOSIS — R29.898 DECREASED GRIP STRENGTH OF LEFT HAND: ICD-10-CM

## 2021-02-13 ENCOUNTER — CLINICAL SUPPORT (OUTPATIENT)
Dept: CARDIOLOGY | Facility: HOSPITAL | Age: 71
End: 2021-02-13
Payer: MEDICARE

## 2021-02-13 DIAGNOSIS — Z95.810 PRESENCE OF AUTOMATIC (IMPLANTABLE) CARDIAC DEFIBRILLATOR: ICD-10-CM

## 2021-02-13 DIAGNOSIS — I25.5 ISCHEMIC CARDIOMYOPATHY: ICD-10-CM

## 2021-02-13 PROCEDURE — 93295 DEV INTERROG REMOTE 1/2/MLT: CPT | Mod: ,,, | Performed by: INTERNAL MEDICINE

## 2021-02-13 PROCEDURE — 93295 CARDIAC DEVICE CHECK - REMOTE: ICD-10-PCS | Mod: ,,, | Performed by: INTERNAL MEDICINE

## 2021-02-13 PROCEDURE — 93296 REM INTERROG EVL PM/IDS: CPT | Performed by: INTERNAL MEDICINE

## 2021-02-18 ENCOUNTER — DOCUMENTATION ONLY (OUTPATIENT)
Dept: REHABILITATION | Facility: HOSPITAL | Age: 71
End: 2021-02-18

## 2021-02-18 DIAGNOSIS — R29.898 FINE MOTOR IMPAIRMENT: ICD-10-CM

## 2021-02-18 DIAGNOSIS — R29.898 DECREASED GRIP STRENGTH OF LEFT HAND: ICD-10-CM

## 2021-02-18 DIAGNOSIS — R29.818 FINE MOTOR IMPAIRMENT: ICD-10-CM

## 2021-02-18 DIAGNOSIS — M25.642 DECREASED RANGE OF MOTION OF FINGER OF LEFT HAND: Primary | ICD-10-CM

## 2021-02-20 LAB
ACID FAST MOD KINY STN SPEC: NORMAL
ACID FAST MOD KINY STN SPEC: NORMAL
MYCOBACTERIUM SPEC QL CULT: NORMAL
MYCOBACTERIUM SPEC QL CULT: NORMAL

## 2021-02-22 ENCOUNTER — HOSPITAL ENCOUNTER (OUTPATIENT)
Facility: HOSPITAL | Age: 71
Discharge: HOME HEALTH | End: 2021-02-23
Attending: INTERNAL MEDICINE | Admitting: INTERNAL MEDICINE
Payer: MEDICARE

## 2021-02-22 DIAGNOSIS — I25.10 CORONARY ARTERY DISEASE INVOLVING NATIVE CORONARY ARTERY OF NATIVE HEART WITHOUT ANGINA PECTORIS: ICD-10-CM

## 2021-02-22 DIAGNOSIS — E66.01 SEVERE OBESITY (BMI 35.0-39.9) WITH COMORBIDITY: ICD-10-CM

## 2021-02-22 DIAGNOSIS — R07.9 CHEST PAIN: ICD-10-CM

## 2021-02-22 DIAGNOSIS — E78.5 HYPERLIPIDEMIA LDL GOAL <70: ICD-10-CM

## 2021-02-22 DIAGNOSIS — R06.02 SHORTNESS OF BREATH: ICD-10-CM

## 2021-02-22 DIAGNOSIS — J96.21 ACUTE ON CHRONIC RESPIRATORY FAILURE WITH HYPOXEMIA: ICD-10-CM

## 2021-02-22 DIAGNOSIS — I50.23 ACUTE ON CHRONIC SYSTOLIC CONGESTIVE HEART FAILURE: Primary | ICD-10-CM

## 2021-02-22 DIAGNOSIS — J96.01 ACUTE HYPOXEMIC RESPIRATORY FAILURE: ICD-10-CM

## 2021-02-22 PROBLEM — N17.9 AKI (ACUTE KIDNEY INJURY): Status: RESOLVED | Noted: 2021-01-22 | Resolved: 2021-02-22

## 2021-02-22 LAB
ALBUMIN SERPL BCP-MCNC: 3.8 G/DL (ref 3.5–5.2)
ALP SERPL-CCNC: 94 U/L (ref 55–135)
ALT SERPL W/O P-5'-P-CCNC: 34 U/L (ref 10–44)
ANION GAP SERPL CALC-SCNC: 13 MMOL/L (ref 8–16)
ANION GAP SERPL CALC-SCNC: 13 MMOL/L (ref 8–16)
AST SERPL-CCNC: 35 U/L (ref 10–40)
BASOPHILS # BLD AUTO: 0.05 K/UL (ref 0–0.2)
BASOPHILS NFR BLD: 0.6 % (ref 0–1.9)
BILIRUB SERPL-MCNC: 0.6 MG/DL (ref 0.1–1)
BNP SERPL-MCNC: 398 PG/ML (ref 0–99)
BUN SERPL-MCNC: 20 MG/DL (ref 8–23)
BUN SERPL-MCNC: 22 MG/DL (ref 8–23)
CALCIUM SERPL-MCNC: 8.7 MG/DL (ref 8.7–10.5)
CALCIUM SERPL-MCNC: 9.3 MG/DL (ref 8.7–10.5)
CHLORIDE SERPL-SCNC: 101 MMOL/L (ref 95–110)
CHLORIDE SERPL-SCNC: 107 MMOL/L (ref 95–110)
CO2 SERPL-SCNC: 20 MMOL/L (ref 23–29)
CO2 SERPL-SCNC: 28 MMOL/L (ref 23–29)
CREAT SERPL-MCNC: 1.1 MG/DL (ref 0.5–1.4)
CREAT SERPL-MCNC: 1.3 MG/DL (ref 0.5–1.4)
CTP QC/QA: YES
DIFFERENTIAL METHOD: ABNORMAL
EOSINOPHIL # BLD AUTO: 0.2 K/UL (ref 0–0.5)
EOSINOPHIL NFR BLD: 2.3 % (ref 0–8)
ERYTHROCYTE [DISTWIDTH] IN BLOOD BY AUTOMATED COUNT: 13.2 % (ref 11.5–14.5)
EST. GFR  (AFRICAN AMERICAN): >60 ML/MIN/1.73 M^2
EST. GFR  (AFRICAN AMERICAN): >60 ML/MIN/1.73 M^2
EST. GFR  (NON AFRICAN AMERICAN): 56.1 ML/MIN/1.73 M^2
EST. GFR  (NON AFRICAN AMERICAN): >60 ML/MIN/1.73 M^2
GLUCOSE SERPL-MCNC: 109 MG/DL (ref 70–110)
GLUCOSE SERPL-MCNC: 79 MG/DL (ref 70–110)
HCT VFR BLD AUTO: 46.3 % (ref 40–54)
HCV AB SERPL QL IA: NEGATIVE
HGB BLD-MCNC: 14.7 G/DL (ref 14–18)
IMM GRANULOCYTES # BLD AUTO: 0.04 K/UL (ref 0–0.04)
IMM GRANULOCYTES NFR BLD AUTO: 0.5 % (ref 0–0.5)
INR PPP: 1 (ref 0.8–1.2)
LYMPHOCYTES # BLD AUTO: 2.2 K/UL (ref 1–4.8)
LYMPHOCYTES NFR BLD: 28 % (ref 18–48)
MAGNESIUM SERPL-MCNC: 2 MG/DL (ref 1.6–2.6)
MCH RBC QN AUTO: 30.2 PG (ref 27–31)
MCHC RBC AUTO-ENTMCNC: 31.7 G/DL (ref 32–36)
MCV RBC AUTO: 95 FL (ref 82–98)
MONOCYTES # BLD AUTO: 0.5 K/UL (ref 0.3–1)
MONOCYTES NFR BLD: 6.8 % (ref 4–15)
NEUTROPHILS # BLD AUTO: 4.8 K/UL (ref 1.8–7.7)
NEUTROPHILS NFR BLD: 61.8 % (ref 38–73)
NRBC BLD-RTO: 0 /100 WBC
PLATELET # BLD AUTO: 213 K/UL (ref 150–350)
PMV BLD AUTO: 10.8 FL (ref 9.2–12.9)
POTASSIUM SERPL-SCNC: 4 MMOL/L (ref 3.5–5.1)
POTASSIUM SERPL-SCNC: 4.4 MMOL/L (ref 3.5–5.1)
PROT SERPL-MCNC: 6.8 G/DL (ref 6–8.4)
PROTHROMBIN TIME: 10.6 SEC (ref 9–12.5)
RBC # BLD AUTO: 4.87 M/UL (ref 4.6–6.2)
SARS-COV-2 RDRP RESP QL NAA+PROBE: NEGATIVE
SODIUM SERPL-SCNC: 140 MMOL/L (ref 136–145)
SODIUM SERPL-SCNC: 142 MMOL/L (ref 136–145)
TROPONIN I SERPL DL<=0.01 NG/ML-MCNC: 0.04 NG/ML (ref 0–0.03)
TROPONIN I SERPL DL<=0.01 NG/ML-MCNC: 0.05 NG/ML (ref 0–0.03)
WBC # BLD AUTO: 7.78 K/UL (ref 3.9–12.7)

## 2021-02-22 PROCEDURE — 93005 ELECTROCARDIOGRAM TRACING: CPT

## 2021-02-22 PROCEDURE — 84484 ASSAY OF TROPONIN QUANT: CPT

## 2021-02-22 PROCEDURE — 85610 PROTHROMBIN TIME: CPT

## 2021-02-22 PROCEDURE — 99220 PR INITIAL OBSERVATION CARE,LEVL III: CPT | Mod: ,,, | Performed by: PHYSICIAN ASSISTANT

## 2021-02-22 PROCEDURE — G0378 HOSPITAL OBSERVATION PER HR: HCPCS

## 2021-02-22 PROCEDURE — 96376 TX/PRO/DX INJ SAME DRUG ADON: CPT

## 2021-02-22 PROCEDURE — 83735 ASSAY OF MAGNESIUM: CPT

## 2021-02-22 PROCEDURE — 63600175 PHARM REV CODE 636 W HCPCS: Performed by: PHYSICIAN ASSISTANT

## 2021-02-22 PROCEDURE — 85025 COMPLETE CBC W/AUTO DIFF WBC: CPT

## 2021-02-22 PROCEDURE — 96374 THER/PROPH/DIAG INJ IV PUSH: CPT

## 2021-02-22 PROCEDURE — U0002 COVID-19 LAB TEST NON-CDC: HCPCS | Performed by: EMERGENCY MEDICINE

## 2021-02-22 PROCEDURE — 93010 ELECTROCARDIOGRAM REPORT: CPT | Mod: ,,, | Performed by: INTERNAL MEDICINE

## 2021-02-22 PROCEDURE — 80053 COMPREHEN METABOLIC PANEL: CPT

## 2021-02-22 PROCEDURE — 84484 ASSAY OF TROPONIN QUANT: CPT | Mod: 91

## 2021-02-22 PROCEDURE — 99220 PR INITIAL OBSERVATION CARE,LEVL III: ICD-10-PCS | Mod: ,,, | Performed by: PHYSICIAN ASSISTANT

## 2021-02-22 PROCEDURE — 63600175 PHARM REV CODE 636 W HCPCS: Performed by: STUDENT IN AN ORGANIZED HEALTH CARE EDUCATION/TRAINING PROGRAM

## 2021-02-22 PROCEDURE — 99285 EMERGENCY DEPT VISIT HI MDM: CPT | Mod: 25

## 2021-02-22 PROCEDURE — 25000003 PHARM REV CODE 250: Performed by: PHYSICIAN ASSISTANT

## 2021-02-22 PROCEDURE — 96375 TX/PRO/DX INJ NEW DRUG ADDON: CPT

## 2021-02-22 PROCEDURE — 99285 PR EMERGENCY DEPT VISIT,LEVEL V: ICD-10-PCS | Mod: GC,CS,, | Performed by: EMERGENCY MEDICINE

## 2021-02-22 PROCEDURE — 83880 ASSAY OF NATRIURETIC PEPTIDE: CPT

## 2021-02-22 PROCEDURE — 93010 EKG 12-LEAD: ICD-10-PCS | Mod: ,,, | Performed by: INTERNAL MEDICINE

## 2021-02-22 PROCEDURE — 36415 COLL VENOUS BLD VENIPUNCTURE: CPT

## 2021-02-22 PROCEDURE — 99285 EMERGENCY DEPT VISIT HI MDM: CPT | Mod: GC,CS,, | Performed by: EMERGENCY MEDICINE

## 2021-02-22 PROCEDURE — 80048 BASIC METABOLIC PNL TOTAL CA: CPT

## 2021-02-22 PROCEDURE — 86803 HEPATITIS C AB TEST: CPT

## 2021-02-22 PROCEDURE — 96372 THER/PROPH/DIAG INJ SC/IM: CPT | Mod: 59

## 2021-02-22 RX ORDER — CLOPIDOGREL BISULFATE 75 MG/1
75 TABLET ORAL DAILY
Status: DISCONTINUED | OUTPATIENT
Start: 2021-02-22 | End: 2021-02-23 | Stop reason: HOSPADM

## 2021-02-22 RX ORDER — IBUPROFEN 200 MG
16 TABLET ORAL
Status: DISCONTINUED | OUTPATIENT
Start: 2021-02-22 | End: 2021-02-23 | Stop reason: HOSPADM

## 2021-02-22 RX ORDER — IPRATROPIUM BROMIDE AND ALBUTEROL SULFATE 2.5; .5 MG/3ML; MG/3ML
3 SOLUTION RESPIRATORY (INHALATION) EVERY 4 HOURS PRN
Status: DISCONTINUED | OUTPATIENT
Start: 2021-02-22 | End: 2021-02-23 | Stop reason: HOSPADM

## 2021-02-22 RX ORDER — HYDROCODONE BITARTRATE AND ACETAMINOPHEN 10; 325 MG/1; MG/1
1 TABLET ORAL EVERY 6 HOURS PRN
Status: DISCONTINUED | OUTPATIENT
Start: 2021-02-22 | End: 2021-02-23 | Stop reason: HOSPADM

## 2021-02-22 RX ORDER — POLYETHYLENE GLYCOL 3350 17 G/17G
17 POWDER, FOR SOLUTION ORAL DAILY
Status: DISCONTINUED | OUTPATIENT
Start: 2021-02-22 | End: 2021-02-23 | Stop reason: HOSPADM

## 2021-02-22 RX ORDER — LISINOPRIL 20 MG/1
20 TABLET ORAL NIGHTLY
Status: DISCONTINUED | OUTPATIENT
Start: 2021-02-22 | End: 2021-02-23 | Stop reason: HOSPADM

## 2021-02-22 RX ORDER — FUROSEMIDE 10 MG/ML
40 INJECTION INTRAMUSCULAR; INTRAVENOUS 2 TIMES DAILY
Status: DISCONTINUED | OUTPATIENT
Start: 2021-02-22 | End: 2021-02-23 | Stop reason: HOSPADM

## 2021-02-22 RX ORDER — ACETAMINOPHEN 500 MG
1000 TABLET ORAL EVERY 8 HOURS PRN
Status: DISCONTINUED | OUTPATIENT
Start: 2021-02-22 | End: 2021-02-23 | Stop reason: HOSPADM

## 2021-02-22 RX ORDER — ACETAMINOPHEN 325 MG/1
650 TABLET ORAL EVERY 4 HOURS PRN
Status: DISCONTINUED | OUTPATIENT
Start: 2021-02-22 | End: 2021-02-23 | Stop reason: HOSPADM

## 2021-02-22 RX ORDER — TALC
6 POWDER (GRAM) TOPICAL NIGHTLY PRN
Status: DISCONTINUED | OUTPATIENT
Start: 2021-02-22 | End: 2021-02-23 | Stop reason: HOSPADM

## 2021-02-22 RX ORDER — METOPROLOL SUCCINATE 100 MG/1
100 TABLET, EXTENDED RELEASE ORAL NIGHTLY
Status: DISCONTINUED | OUTPATIENT
Start: 2021-02-22 | End: 2021-02-23 | Stop reason: HOSPADM

## 2021-02-22 RX ORDER — FUROSEMIDE 10 MG/ML
80 INJECTION INTRAMUSCULAR; INTRAVENOUS
Status: COMPLETED | OUTPATIENT
Start: 2021-02-22 | End: 2021-02-22

## 2021-02-22 RX ORDER — ONDANSETRON 8 MG/1
8 TABLET, ORALLY DISINTEGRATING ORAL EVERY 8 HOURS PRN
Status: DISCONTINUED | OUTPATIENT
Start: 2021-02-22 | End: 2021-02-23 | Stop reason: HOSPADM

## 2021-02-22 RX ORDER — SODIUM CHLORIDE 0.9 % (FLUSH) 0.9 %
10 SYRINGE (ML) INJECTION
Status: DISCONTINUED | OUTPATIENT
Start: 2021-02-22 | End: 2021-02-23 | Stop reason: HOSPADM

## 2021-02-22 RX ORDER — ENOXAPARIN SODIUM 100 MG/ML
40 INJECTION SUBCUTANEOUS EVERY 24 HOURS
Status: DISCONTINUED | OUTPATIENT
Start: 2021-02-22 | End: 2021-02-23 | Stop reason: HOSPADM

## 2021-02-22 RX ORDER — ATORVASTATIN CALCIUM 20 MG/1
80 TABLET, FILM COATED ORAL NIGHTLY
Status: DISCONTINUED | OUTPATIENT
Start: 2021-02-22 | End: 2021-02-23 | Stop reason: HOSPADM

## 2021-02-22 RX ORDER — ASPIRIN 325 MG
325 TABLET, DELAYED RELEASE (ENTERIC COATED) ORAL NIGHTLY
Status: DISCONTINUED | OUTPATIENT
Start: 2021-02-22 | End: 2021-02-23 | Stop reason: HOSPADM

## 2021-02-22 RX ORDER — MAGNESIUM SULFATE HEPTAHYDRATE 40 MG/ML
2 INJECTION, SOLUTION INTRAVENOUS ONCE
Status: COMPLETED | OUTPATIENT
Start: 2021-02-22 | End: 2021-02-22

## 2021-02-22 RX ORDER — GLUCAGON 1 MG
1 KIT INJECTION
Status: DISCONTINUED | OUTPATIENT
Start: 2021-02-22 | End: 2021-02-23 | Stop reason: HOSPADM

## 2021-02-22 RX ORDER — IBUPROFEN 200 MG
24 TABLET ORAL
Status: DISCONTINUED | OUTPATIENT
Start: 2021-02-22 | End: 2021-02-23 | Stop reason: HOSPADM

## 2021-02-22 RX ORDER — BISACODYL 10 MG
10 SUPPOSITORY, RECTAL RECTAL DAILY PRN
Status: DISCONTINUED | OUTPATIENT
Start: 2021-02-22 | End: 2021-02-23 | Stop reason: HOSPADM

## 2021-02-22 RX ORDER — POTASSIUM CHLORIDE 20 MEQ/1
40 TABLET, EXTENDED RELEASE ORAL ONCE
Status: COMPLETED | OUTPATIENT
Start: 2021-02-22 | End: 2021-02-22

## 2021-02-22 RX ADMIN — ATORVASTATIN CALCIUM 80 MG: 20 TABLET, FILM COATED ORAL at 08:02

## 2021-02-22 RX ADMIN — FUROSEMIDE 80 MG: 10 INJECTION, SOLUTION INTRAMUSCULAR; INTRAVENOUS at 03:02

## 2021-02-22 RX ADMIN — LISINOPRIL 20 MG: 20 TABLET ORAL at 08:02

## 2021-02-22 RX ADMIN — HYDROCODONE BITARTRATE AND ACETAMINOPHEN 1 TABLET: 10; 325 TABLET ORAL at 10:02

## 2021-02-22 RX ADMIN — HYDROCODONE BITARTRATE AND ACETAMINOPHEN 1 TABLET: 10; 325 TABLET ORAL at 08:02

## 2021-02-22 RX ADMIN — ENOXAPARIN SODIUM 40 MG: 40 INJECTION SUBCUTANEOUS at 05:02

## 2021-02-22 RX ADMIN — ASPIRIN 325 MG: 325 TABLET, COATED ORAL at 08:02

## 2021-02-22 RX ADMIN — AMIODARONE HYDROCHLORIDE 300 MG: 200 TABLET ORAL at 08:02

## 2021-02-22 RX ADMIN — METOPROLOL SUCCINATE 100 MG: 100 TABLET, EXTENDED RELEASE ORAL at 08:02

## 2021-02-22 RX ADMIN — ALLOPURINOL 100 MG: 300 TABLET ORAL at 10:02

## 2021-02-22 RX ADMIN — MAGNESIUM SULFATE 2 G: 2 INJECTION INTRAVENOUS at 11:02

## 2021-02-22 RX ADMIN — CLOPIDOGREL 75 MG: 75 TABLET, FILM COATED ORAL at 10:02

## 2021-02-22 RX ADMIN — FUROSEMIDE 40 MG: 10 INJECTION, SOLUTION INTRAMUSCULAR; INTRAVENOUS at 08:02

## 2021-02-22 RX ADMIN — POTASSIUM CHLORIDE 40 MEQ: 1500 TABLET, EXTENDED RELEASE ORAL at 11:02

## 2021-02-22 RX ADMIN — ALLOPURINOL 100 MG: 300 TABLET ORAL at 08:02

## 2021-02-23 VITALS
SYSTOLIC BLOOD PRESSURE: 98 MMHG | RESPIRATION RATE: 19 BRPM | OXYGEN SATURATION: 92 % | BODY MASS INDEX: 35.68 KG/M2 | WEIGHT: 227.31 LBS | DIASTOLIC BLOOD PRESSURE: 64 MMHG | HEART RATE: 61 BPM | HEIGHT: 67 IN | TEMPERATURE: 97 F

## 2021-02-23 LAB
ANION GAP SERPL CALC-SCNC: 12 MMOL/L (ref 8–16)
BASOPHILS # BLD AUTO: 0.05 K/UL (ref 0–0.2)
BASOPHILS NFR BLD: 0.7 % (ref 0–1.9)
BUN SERPL-MCNC: 23 MG/DL (ref 8–23)
CALCIUM SERPL-MCNC: 9 MG/DL (ref 8.7–10.5)
CHLORIDE SERPL-SCNC: 101 MMOL/L (ref 95–110)
CO2 SERPL-SCNC: 26 MMOL/L (ref 23–29)
CREAT SERPL-MCNC: 1 MG/DL (ref 0.5–1.4)
DIFFERENTIAL METHOD: NORMAL
EOSINOPHIL # BLD AUTO: 0.2 K/UL (ref 0–0.5)
EOSINOPHIL NFR BLD: 2.3 % (ref 0–8)
ERYTHROCYTE [DISTWIDTH] IN BLOOD BY AUTOMATED COUNT: 13.2 % (ref 11.5–14.5)
EST. GFR  (AFRICAN AMERICAN): >60 ML/MIN/1.73 M^2
EST. GFR  (NON AFRICAN AMERICAN): >60 ML/MIN/1.73 M^2
ESTIMATED AVG GLUCOSE: 114 MG/DL (ref 68–131)
GLUCOSE SERPL-MCNC: 92 MG/DL (ref 70–110)
HBA1C MFR BLD: 5.6 % (ref 4–5.6)
HCT VFR BLD AUTO: 49.5 % (ref 40–54)
HGB BLD-MCNC: 15.9 G/DL (ref 14–18)
IMM GRANULOCYTES # BLD AUTO: 0.03 K/UL (ref 0–0.04)
IMM GRANULOCYTES NFR BLD AUTO: 0.4 % (ref 0–0.5)
LYMPHOCYTES # BLD AUTO: 2.4 K/UL (ref 1–4.8)
LYMPHOCYTES NFR BLD: 33.2 % (ref 18–48)
MAGNESIUM SERPL-MCNC: 2.2 MG/DL (ref 1.6–2.6)
MCH RBC QN AUTO: 29.8 PG (ref 27–31)
MCHC RBC AUTO-ENTMCNC: 32.1 G/DL (ref 32–36)
MCV RBC AUTO: 93 FL (ref 82–98)
MONOCYTES # BLD AUTO: 0.6 K/UL (ref 0.3–1)
MONOCYTES NFR BLD: 8.6 % (ref 4–15)
NEUTROPHILS # BLD AUTO: 3.9 K/UL (ref 1.8–7.7)
NEUTROPHILS NFR BLD: 54.8 % (ref 38–73)
NRBC BLD-RTO: 0 /100 WBC
PHOSPHATE SERPL-MCNC: 3.9 MG/DL (ref 2.7–4.5)
PLATELET # BLD AUTO: 218 K/UL (ref 150–350)
PMV BLD AUTO: 10.8 FL (ref 9.2–12.9)
POTASSIUM SERPL-SCNC: 4.1 MMOL/L (ref 3.5–5.1)
RBC # BLD AUTO: 5.33 M/UL (ref 4.6–6.2)
SODIUM SERPL-SCNC: 139 MMOL/L (ref 136–145)
WBC # BLD AUTO: 7.1 K/UL (ref 3.9–12.7)

## 2021-02-23 PROCEDURE — 85025 COMPLETE CBC W/AUTO DIFF WBC: CPT

## 2021-02-23 PROCEDURE — 83036 HEMOGLOBIN GLYCOSYLATED A1C: CPT

## 2021-02-23 PROCEDURE — 84100 ASSAY OF PHOSPHORUS: CPT

## 2021-02-23 PROCEDURE — 83735 ASSAY OF MAGNESIUM: CPT

## 2021-02-23 PROCEDURE — G0378 HOSPITAL OBSERVATION PER HR: HCPCS

## 2021-02-23 PROCEDURE — 80048 BASIC METABOLIC PNL TOTAL CA: CPT

## 2021-02-23 PROCEDURE — 25000003 PHARM REV CODE 250: Performed by: PHYSICIAN ASSISTANT

## 2021-02-23 PROCEDURE — 96376 TX/PRO/DX INJ SAME DRUG ADON: CPT

## 2021-02-23 PROCEDURE — 36415 COLL VENOUS BLD VENIPUNCTURE: CPT

## 2021-02-23 PROCEDURE — 99217 PR OBSERVATION CARE DISCHARGE: CPT | Mod: ,,, | Performed by: PHYSICIAN ASSISTANT

## 2021-02-23 PROCEDURE — 99217 PR OBSERVATION CARE DISCHARGE: ICD-10-PCS | Mod: ,,, | Performed by: PHYSICIAN ASSISTANT

## 2021-02-23 PROCEDURE — 63600175 PHARM REV CODE 636 W HCPCS: Performed by: PHYSICIAN ASSISTANT

## 2021-02-23 RX ORDER — BENZONATATE 100 MG/1
100 CAPSULE ORAL 3 TIMES DAILY PRN
Qty: 30 CAPSULE | Refills: 0 | Status: SHIPPED | OUTPATIENT
Start: 2021-02-23 | End: 2021-03-05

## 2021-02-23 RX ADMIN — CLOPIDOGREL 75 MG: 75 TABLET, FILM COATED ORAL at 08:02

## 2021-02-23 RX ADMIN — ALLOPURINOL 100 MG: 300 TABLET ORAL at 08:02

## 2021-02-23 RX ADMIN — FUROSEMIDE 40 MG: 10 INJECTION, SOLUTION INTRAMUSCULAR; INTRAVENOUS at 08:02

## 2021-02-25 PROCEDURE — G0180 PR HOME HEALTH MD CERTIFICATION: ICD-10-PCS | Mod: ,,, | Performed by: INTERNAL MEDICINE

## 2021-02-25 PROCEDURE — G0180 MD CERTIFICATION HHA PATIENT: HCPCS | Mod: ,,, | Performed by: INTERNAL MEDICINE

## 2021-03-04 ENCOUNTER — DOCUMENTATION ONLY (OUTPATIENT)
Dept: REHABILITATION | Facility: HOSPITAL | Age: 71
End: 2021-03-04

## 2021-03-04 DIAGNOSIS — R29.818 FINE MOTOR IMPAIRMENT: ICD-10-CM

## 2021-03-04 DIAGNOSIS — M25.642 DECREASED RANGE OF MOTION OF FINGER OF LEFT HAND: Primary | ICD-10-CM

## 2021-03-04 DIAGNOSIS — R29.898 DECREASED GRIP STRENGTH OF LEFT HAND: ICD-10-CM

## 2021-03-04 DIAGNOSIS — R29.898 FINE MOTOR IMPAIRMENT: ICD-10-CM

## 2021-03-23 ENCOUNTER — SSC ENCOUNTER (OUTPATIENT)
Dept: ADMINISTRATIVE | Facility: OTHER | Age: 71
End: 2021-03-23

## 2021-04-05 RX ORDER — METOPROLOL SUCCINATE 100 MG/1
100 TABLET, EXTENDED RELEASE ORAL DAILY
Qty: 90 TABLET | Refills: 1 | Status: SHIPPED | OUTPATIENT
Start: 2021-04-05 | End: 2021-10-07

## 2021-05-11 DIAGNOSIS — I25.5 ISCHEMIC CARDIOMYOPATHY: Primary | ICD-10-CM

## 2021-05-14 ENCOUNTER — CLINICAL SUPPORT (OUTPATIENT)
Dept: CARDIOLOGY | Facility: HOSPITAL | Age: 71
End: 2021-05-14
Payer: MEDICARE

## 2021-05-14 DIAGNOSIS — Z95.810 PRESENCE OF AUTOMATIC (IMPLANTABLE) CARDIAC DEFIBRILLATOR: ICD-10-CM

## 2021-05-14 PROCEDURE — 93295 DEV INTERROG REMOTE 1/2/MLT: CPT | Mod: ,,, | Performed by: INTERNAL MEDICINE

## 2021-05-14 PROCEDURE — 93295 CARDIAC DEVICE CHECK - REMOTE: ICD-10-PCS | Mod: ,,, | Performed by: INTERNAL MEDICINE

## 2021-05-14 PROCEDURE — 93296 REM INTERROG EVL PM/IDS: CPT | Performed by: INTERNAL MEDICINE

## 2021-06-04 ENCOUNTER — PATIENT OUTREACH (OUTPATIENT)
Dept: EMERGENCY MEDICINE | Facility: HOSPITAL | Age: 71
End: 2021-06-04

## 2021-06-04 ENCOUNTER — HOSPITAL ENCOUNTER (OUTPATIENT)
Facility: HOSPITAL | Age: 71
Discharge: HOME | End: 2021-06-05
Attending: EMERGENCY MEDICINE | Admitting: INTERNAL MEDICINE
Payer: MEDICARE

## 2021-06-04 DIAGNOSIS — R04.2 HEMOPTYSIS: ICD-10-CM

## 2021-06-04 DIAGNOSIS — R07.9 CHEST PAIN: ICD-10-CM

## 2021-06-04 DIAGNOSIS — J06.9 UPPER RESPIRATORY TRACT INFECTION, UNSPECIFIED TYPE: ICD-10-CM

## 2021-06-04 DIAGNOSIS — I50.9 CHF (CONGESTIVE HEART FAILURE): ICD-10-CM

## 2021-06-04 DIAGNOSIS — R09.02 HYPOXIA: Primary | ICD-10-CM

## 2021-06-04 DIAGNOSIS — J44.1 COPD EXACERBATION: ICD-10-CM

## 2021-06-04 DIAGNOSIS — R06.02 SOB (SHORTNESS OF BREATH): ICD-10-CM

## 2021-06-04 LAB
ALBUMIN SERPL BCP-MCNC: 3.6 G/DL (ref 3.5–5.2)
ALP SERPL-CCNC: 100 U/L (ref 55–135)
ALT SERPL W/O P-5'-P-CCNC: 31 U/L (ref 10–44)
ANION GAP SERPL CALC-SCNC: 11 MMOL/L (ref 8–16)
ASCENDING AORTA: 3.39 CM
AST SERPL-CCNC: 28 U/L (ref 10–40)
AV INDEX (PROSTH): 0.61
AV MEAN GRADIENT: 4 MMHG
AV VALVE AREA: 1.83 CM2
BASOPHILS # BLD AUTO: 0.04 K/UL (ref 0–0.2)
BASOPHILS NFR BLD: 0.8 % (ref 0–1.9)
BILIRUB SERPL-MCNC: 0.4 MG/DL (ref 0.1–1)
BILIRUB UR QL STRIP: NEGATIVE
BNP SERPL-MCNC: 197 PG/ML (ref 0–99)
BSA FOR ECHO PROCEDURE: 2.22 M2
BUN SERPL-MCNC: 15 MG/DL (ref 8–23)
CALCIUM SERPL-MCNC: 8.9 MG/DL (ref 8.7–10.5)
CHLORIDE SERPL-SCNC: 109 MMOL/L (ref 95–110)
CLARITY UR REFRACT.AUTO: CLEAR
CO2 SERPL-SCNC: 25 MMOL/L (ref 23–29)
COLOR UR AUTO: NORMAL
CREAT SERPL-MCNC: 1.1 MG/DL (ref 0.5–1.4)
CTP QC/QA: YES
CV ECHO LV RWT: 0.19 CM
DIFFERENTIAL METHOD: NORMAL
DOP CALC AO VTI: 24.54 CM
DOP CALC LVOT AREA: 3 CM2
DOP CALC LVOT DIAMETER: 1.96 CM
DOP CALC LVOT PEAK VEL: 0.9 M/S
DOP CALC LVOT STROKE VOLUME: 44.99 CM3
DOP CALCLVOT PEAK VEL VTI: 14.92 CM
E WAVE DECELERATION TIME: 106.92 MSEC
E/A RATIO: 0.41
E/E' RATIO: 9.11 M/S
ECHO LV POSTERIOR WALL: 0.65 CM (ref 0.6–1.1)
EJECTION FRACTION: 15 %
EOSINOPHIL # BLD AUTO: 0.1 K/UL (ref 0–0.5)
EOSINOPHIL NFR BLD: 2.7 % (ref 0–8)
ERYTHROCYTE [DISTWIDTH] IN BLOOD BY AUTOMATED COUNT: 14.2 % (ref 11.5–14.5)
EST. GFR  (AFRICAN AMERICAN): >60 ML/MIN/1.73 M^2
EST. GFR  (NON AFRICAN AMERICAN): >60 ML/MIN/1.73 M^2
FRACTIONAL SHORTENING: 9 % (ref 28–44)
GLUCOSE SERPL-MCNC: 103 MG/DL (ref 70–110)
GLUCOSE UR QL STRIP: NEGATIVE
HCT VFR BLD AUTO: 44.9 % (ref 40–54)
HCV AB SERPL QL IA: NEGATIVE
HGB BLD-MCNC: 14.9 G/DL (ref 14–18)
HGB UR QL STRIP: NEGATIVE
IMM GRANULOCYTES # BLD AUTO: 0.01 K/UL (ref 0–0.04)
IMM GRANULOCYTES NFR BLD AUTO: 0.2 % (ref 0–0.5)
INR PPP: 0.9 (ref 0.8–1.2)
INTERVENTRICULAR SEPTUM: 0.54 CM (ref 0.6–1.1)
IVRT: 171.27 MSEC
KETONES UR QL STRIP: NEGATIVE
LA MAJOR: 5.94 CM
LA MINOR: 5.87 CM
LA WIDTH: 4.03 CM
LEFT ATRIUM SIZE: 3.75 CM
LEFT ATRIUM VOLUME INDEX MOD: 26.5 ML/M2
LEFT ATRIUM VOLUME INDEX: 35.3 ML/M2
LEFT ATRIUM VOLUME MOD: 57.05 CM3
LEFT ATRIUM VOLUME: 75.85 CM3
LEFT INTERNAL DIMENSION IN SYSTOLE: 6.34 CM (ref 2.1–4)
LEFT VENTRICLE DIASTOLIC VOLUME INDEX: 118.07 ML/M2
LEFT VENTRICLE DIASTOLIC VOLUME: 253.85 ML
LEFT VENTRICLE MASS INDEX: 80 G/M2
LEFT VENTRICLE SYSTOLIC VOLUME INDEX: 95.1 ML/M2
LEFT VENTRICLE SYSTOLIC VOLUME: 204.45 ML
LEFT VENTRICULAR INTERNAL DIMENSION IN DIASTOLE: 6.98 CM (ref 3.5–6)
LEFT VENTRICULAR MASS: 171.38 G
LEUKOCYTE ESTERASE UR QL STRIP: NEGATIVE
LV LATERAL E/E' RATIO: 10.25 M/S
LV SEPTAL E/E' RATIO: 8.2 M/S
LYMPHOCYTES # BLD AUTO: 1.8 K/UL (ref 1–4.8)
LYMPHOCYTES NFR BLD: 37.9 % (ref 18–48)
MCH RBC QN AUTO: 30.8 PG (ref 27–31)
MCHC RBC AUTO-ENTMCNC: 33.2 G/DL (ref 32–36)
MCV RBC AUTO: 93 FL (ref 82–98)
MONOCYTES # BLD AUTO: 0.7 K/UL (ref 0.3–1)
MONOCYTES NFR BLD: 13.6 % (ref 4–15)
MV A" WAVE DURATION": 9.13 MSEC
MV PEAK A VEL: 0.99 M/S
MV PEAK E VEL: 0.41 M/S
MV STENOSIS PRESSURE HALF TIME: 31.01 MS
MV VALVE AREA P 1/2 METHOD: 7.09 CM2
NEUTROPHILS # BLD AUTO: 2.2 K/UL (ref 1.8–7.7)
NEUTROPHILS NFR BLD: 44.8 % (ref 38–73)
NITRITE UR QL STRIP: NEGATIVE
NRBC BLD-RTO: 0 /100 WBC
PH UR STRIP: 5 [PH] (ref 5–8)
PISA TR MAX VEL: 1.72 M/S
PLATELET # BLD AUTO: 154 K/UL (ref 150–450)
PMV BLD AUTO: 10.1 FL (ref 9.2–12.9)
POCT GLUCOSE: 112 MG/DL (ref 70–110)
POCT GLUCOSE: 140 MG/DL (ref 70–110)
POTASSIUM SERPL-SCNC: 3.9 MMOL/L (ref 3.5–5.1)
PROCALCITONIN SERPL IA-MCNC: 0.09 NG/ML
PROT SERPL-MCNC: 6.6 G/DL (ref 6–8.4)
PROT UR QL STRIP: NEGATIVE
PROTHROMBIN TIME: 10.2 SEC (ref 9–12.5)
PULM VEIN S/D RATIO: 1.72
PV PEAK D VEL: 0.32 M/S
PV PEAK S VEL: 0.55 M/S
RA MAJOR: 4.5 CM
RA PRESSURE: 3 MMHG
RA WIDTH: 3.81 CM
RBC # BLD AUTO: 4.84 M/UL (ref 4.6–6.2)
RIGHT VENTRICULAR END-DIASTOLIC DIMENSION: 4.03 CM
RV TISSUE DOPPLER FREE WALL SYSTOLIC VELOCITY 1 (APICAL 4 CHAMBER VIEW): 12.35 CM/S
SARS-COV-2 RDRP RESP QL NAA+PROBE: NEGATIVE
SINUS: 2.97 CM
SODIUM SERPL-SCNC: 145 MMOL/L (ref 136–145)
SP GR UR STRIP: 1.02 (ref 1–1.03)
STJ: 2.87 CM
TDI LATERAL: 0.04 M/S
TDI SEPTAL: 0.05 M/S
TDI: 0.05 M/S
TR MAX PG: 12 MMHG
TRICUSPID ANNULAR PLANE SYSTOLIC EXCURSION: 2.4 CM
TROPONIN I SERPL DL<=0.01 NG/ML-MCNC: 0.04 NG/ML (ref 0–0.03)
TROPONIN I SERPL DL<=0.01 NG/ML-MCNC: 0.05 NG/ML (ref 0–0.03)
TROPONIN I SERPL DL<=0.01 NG/ML-MCNC: 0.05 NG/ML (ref 0–0.03)
TV REST PULMONARY ARTERY PRESSURE: 15 MMHG
URN SPEC COLLECT METH UR: NORMAL
WBC # BLD AUTO: 4.85 K/UL (ref 3.9–12.7)

## 2021-06-04 PROCEDURE — 80053 COMPREHEN METABOLIC PANEL: CPT | Performed by: EMERGENCY MEDICINE

## 2021-06-04 PROCEDURE — 99220 PR INITIAL OBSERVATION CARE,LEVL III: ICD-10-PCS | Mod: ,,, | Performed by: PHYSICIAN ASSISTANT

## 2021-06-04 PROCEDURE — 94640 AIRWAY INHALATION TREATMENT: CPT

## 2021-06-04 PROCEDURE — 99220 PR INITIAL OBSERVATION CARE,LEVL III: CPT | Mod: ,,, | Performed by: PHYSICIAN ASSISTANT

## 2021-06-04 PROCEDURE — 25000242 PHARM REV CODE 250 ALT 637 W/ HCPCS: Performed by: EMERGENCY MEDICINE

## 2021-06-04 PROCEDURE — 25000003 PHARM REV CODE 250: Performed by: PHYSICIAN ASSISTANT

## 2021-06-04 PROCEDURE — 81003 URINALYSIS AUTO W/O SCOPE: CPT | Performed by: PHYSICIAN ASSISTANT

## 2021-06-04 PROCEDURE — 99900035 HC TECH TIME PER 15 MIN (STAT)

## 2021-06-04 PROCEDURE — 93010 ELECTROCARDIOGRAM REPORT: CPT | Mod: ,,, | Performed by: INTERNAL MEDICINE

## 2021-06-04 PROCEDURE — 63700000 PHARM REV CODE 250 ALT 637 W/O HCPCS: Performed by: EMERGENCY MEDICINE

## 2021-06-04 PROCEDURE — G0378 HOSPITAL OBSERVATION PER HR: HCPCS

## 2021-06-04 PROCEDURE — 96374 THER/PROPH/DIAG INJ IV PUSH: CPT | Mod: 59

## 2021-06-04 PROCEDURE — 63600175 PHARM REV CODE 636 W HCPCS: Performed by: EMERGENCY MEDICINE

## 2021-06-04 PROCEDURE — U0002 COVID-19 LAB TEST NON-CDC: HCPCS | Performed by: EMERGENCY MEDICINE

## 2021-06-04 PROCEDURE — 84484 ASSAY OF TROPONIN QUANT: CPT | Performed by: EMERGENCY MEDICINE

## 2021-06-04 PROCEDURE — 83880 ASSAY OF NATRIURETIC PEPTIDE: CPT | Performed by: EMERGENCY MEDICINE

## 2021-06-04 PROCEDURE — 93005 ELECTROCARDIOGRAM TRACING: CPT

## 2021-06-04 PROCEDURE — 96372 THER/PROPH/DIAG INJ SC/IM: CPT | Mod: 59

## 2021-06-04 PROCEDURE — 82962 GLUCOSE BLOOD TEST: CPT

## 2021-06-04 PROCEDURE — 99285 EMERGENCY DEPT VISIT HI MDM: CPT | Mod: 25

## 2021-06-04 PROCEDURE — 25500020 PHARM REV CODE 255: Performed by: EMERGENCY MEDICINE

## 2021-06-04 PROCEDURE — 25000242 PHARM REV CODE 250 ALT 637 W/ HCPCS: Performed by: PHYSICIAN ASSISTANT

## 2021-06-04 PROCEDURE — 94761 N-INVAS EAR/PLS OXIMETRY MLT: CPT

## 2021-06-04 PROCEDURE — 84145 PROCALCITONIN (PCT): CPT | Performed by: EMERGENCY MEDICINE

## 2021-06-04 PROCEDURE — 86803 HEPATITIS C AB TEST: CPT | Performed by: EMERGENCY MEDICINE

## 2021-06-04 PROCEDURE — 99285 PR EMERGENCY DEPT VISIT,LEVEL V: ICD-10-PCS | Mod: CS,,, | Performed by: EMERGENCY MEDICINE

## 2021-06-04 PROCEDURE — 93010 EKG 12-LEAD: ICD-10-PCS | Mod: ,,, | Performed by: INTERNAL MEDICINE

## 2021-06-04 PROCEDURE — 25500020 PHARM REV CODE 255: Performed by: PHYSICIAN ASSISTANT

## 2021-06-04 PROCEDURE — 84484 ASSAY OF TROPONIN QUANT: CPT | Mod: 91 | Performed by: PHYSICIAN ASSISTANT

## 2021-06-04 PROCEDURE — 63600175 PHARM REV CODE 636 W HCPCS: Performed by: PHYSICIAN ASSISTANT

## 2021-06-04 PROCEDURE — 96375 TX/PRO/DX INJ NEW DRUG ADDON: CPT | Mod: 59

## 2021-06-04 PROCEDURE — 85610 PROTHROMBIN TIME: CPT | Performed by: EMERGENCY MEDICINE

## 2021-06-04 PROCEDURE — 99285 EMERGENCY DEPT VISIT HI MDM: CPT | Mod: CS,,, | Performed by: EMERGENCY MEDICINE

## 2021-06-04 PROCEDURE — 85025 COMPLETE CBC W/AUTO DIFF WBC: CPT | Performed by: EMERGENCY MEDICINE

## 2021-06-04 PROCEDURE — 27000221 HC OXYGEN, UP TO 24 HOURS

## 2021-06-04 RX ORDER — ALBUTEROL SULFATE 2.5 MG/.5ML
2.5 SOLUTION RESPIRATORY (INHALATION)
Status: DISCONTINUED | OUTPATIENT
Start: 2021-06-04 | End: 2021-06-04

## 2021-06-04 RX ORDER — HEPARIN SODIUM 5000 [USP'U]/ML
5000 INJECTION, SOLUTION INTRAVENOUS; SUBCUTANEOUS EVERY 8 HOURS
Status: DISCONTINUED | OUTPATIENT
Start: 2021-06-04 | End: 2021-06-04

## 2021-06-04 RX ORDER — GLUCAGON 1 MG
1 KIT INJECTION
Status: DISCONTINUED | OUTPATIENT
Start: 2021-06-04 | End: 2021-06-05 | Stop reason: HOSPADM

## 2021-06-04 RX ORDER — SODIUM CHLORIDE 0.9 % (FLUSH) 0.9 %
5 SYRINGE (ML) INJECTION
Status: DISCONTINUED | OUTPATIENT
Start: 2021-06-04 | End: 2021-06-05 | Stop reason: HOSPADM

## 2021-06-04 RX ORDER — HYDROCODONE BITARTRATE AND ACETAMINOPHEN 5; 325 MG/1; MG/1
1 TABLET ORAL EVERY 6 HOURS PRN
Status: DISCONTINUED | OUTPATIENT
Start: 2021-06-04 | End: 2021-06-05 | Stop reason: HOSPADM

## 2021-06-04 RX ORDER — ASPIRIN 325 MG
325 TABLET, DELAYED RELEASE (ENTERIC COATED) ORAL NIGHTLY
Status: DISCONTINUED | OUTPATIENT
Start: 2021-06-04 | End: 2021-06-05 | Stop reason: HOSPADM

## 2021-06-04 RX ORDER — HEPARIN SODIUM 5000 [USP'U]/ML
5000 INJECTION, SOLUTION INTRAVENOUS; SUBCUTANEOUS EVERY 8 HOURS
Status: DISCONTINUED | OUTPATIENT
Start: 2021-06-04 | End: 2021-06-05 | Stop reason: HOSPADM

## 2021-06-04 RX ORDER — ACETAMINOPHEN 325 MG/1
650 TABLET ORAL EVERY 8 HOURS PRN
Status: DISCONTINUED | OUTPATIENT
Start: 2021-06-04 | End: 2021-06-05 | Stop reason: HOSPADM

## 2021-06-04 RX ORDER — ONDANSETRON 8 MG/1
8 TABLET, ORALLY DISINTEGRATING ORAL EVERY 8 HOURS PRN
Status: DISCONTINUED | OUTPATIENT
Start: 2021-06-04 | End: 2021-06-05 | Stop reason: HOSPADM

## 2021-06-04 RX ORDER — IBUPROFEN 200 MG
24 TABLET ORAL
Status: DISCONTINUED | OUTPATIENT
Start: 2021-06-04 | End: 2021-06-05 | Stop reason: HOSPADM

## 2021-06-04 RX ORDER — ATORVASTATIN CALCIUM 40 MG/1
80 TABLET, FILM COATED ORAL NIGHTLY
Status: DISCONTINUED | OUTPATIENT
Start: 2021-06-04 | End: 2021-06-05 | Stop reason: HOSPADM

## 2021-06-04 RX ORDER — CLOPIDOGREL BISULFATE 75 MG/1
75 TABLET ORAL DAILY
Status: DISCONTINUED | OUTPATIENT
Start: 2021-06-04 | End: 2021-06-05 | Stop reason: HOSPADM

## 2021-06-04 RX ORDER — POLYETHYLENE GLYCOL 3350 17 G/17G
17 POWDER, FOR SOLUTION ORAL DAILY
Status: DISCONTINUED | OUTPATIENT
Start: 2021-06-04 | End: 2021-06-05 | Stop reason: HOSPADM

## 2021-06-04 RX ORDER — ALLOPURINOL 100 MG/1
100 TABLET ORAL 2 TIMES DAILY
Status: DISCONTINUED | OUTPATIENT
Start: 2021-06-04 | End: 2021-06-05 | Stop reason: HOSPADM

## 2021-06-04 RX ORDER — TALC
9 POWDER (GRAM) TOPICAL NIGHTLY PRN
Status: DISCONTINUED | OUTPATIENT
Start: 2021-06-04 | End: 2021-06-05 | Stop reason: HOSPADM

## 2021-06-04 RX ORDER — IBUPROFEN 200 MG
16 TABLET ORAL
Status: DISCONTINUED | OUTPATIENT
Start: 2021-06-04 | End: 2021-06-05 | Stop reason: HOSPADM

## 2021-06-04 RX ORDER — DIPHENHYDRAMINE HYDROCHLORIDE 50 MG/ML
25 INJECTION INTRAMUSCULAR; INTRAVENOUS
Status: COMPLETED | OUTPATIENT
Start: 2021-06-04 | End: 2021-06-04

## 2021-06-04 RX ORDER — BENZONATATE 100 MG/1
100 CAPSULE ORAL 3 TIMES DAILY
Status: DISCONTINUED | OUTPATIENT
Start: 2021-06-04 | End: 2021-06-05 | Stop reason: HOSPADM

## 2021-06-04 RX ORDER — FUROSEMIDE 40 MG/1
40 TABLET ORAL DAILY
Status: DISCONTINUED | OUTPATIENT
Start: 2021-06-05 | End: 2021-06-05 | Stop reason: HOSPADM

## 2021-06-04 RX ORDER — FUROSEMIDE 10 MG/ML
40 INJECTION INTRAMUSCULAR; INTRAVENOUS ONCE
Status: COMPLETED | OUTPATIENT
Start: 2021-06-04 | End: 2021-06-04

## 2021-06-04 RX ORDER — AZITHROMYCIN 250 MG/1
500 TABLET, FILM COATED ORAL
Status: COMPLETED | OUTPATIENT
Start: 2021-06-04 | End: 2021-06-04

## 2021-06-04 RX ORDER — LISINOPRIL 20 MG/1
20 TABLET ORAL NIGHTLY
Status: DISCONTINUED | OUTPATIENT
Start: 2021-06-04 | End: 2021-06-05 | Stop reason: HOSPADM

## 2021-06-04 RX ORDER — BISACODYL 10 MG
10 SUPPOSITORY, RECTAL RECTAL DAILY PRN
Status: DISCONTINUED | OUTPATIENT
Start: 2021-06-04 | End: 2021-06-05 | Stop reason: HOSPADM

## 2021-06-04 RX ORDER — BENZONATATE 100 MG/1
100 CAPSULE ORAL 3 TIMES DAILY
Status: ON HOLD | COMMUNITY
Start: 2021-04-11 | End: 2021-06-15 | Stop reason: HOSPADM

## 2021-06-04 RX ORDER — IPRATROPIUM BROMIDE AND ALBUTEROL SULFATE 2.5; .5 MG/3ML; MG/3ML
3 SOLUTION RESPIRATORY (INHALATION)
Status: COMPLETED | OUTPATIENT
Start: 2021-06-04 | End: 2021-06-04

## 2021-06-04 RX ORDER — ACETAMINOPHEN 325 MG/1
650 TABLET ORAL EVERY 4 HOURS PRN
Status: DISCONTINUED | OUTPATIENT
Start: 2021-06-04 | End: 2021-06-05 | Stop reason: HOSPADM

## 2021-06-04 RX ORDER — PREDNISONE 20 MG/1
60 TABLET ORAL
Status: COMPLETED | OUTPATIENT
Start: 2021-06-04 | End: 2021-06-04

## 2021-06-04 RX ORDER — METOPROLOL SUCCINATE 100 MG/1
100 TABLET, EXTENDED RELEASE ORAL DAILY
Status: DISCONTINUED | OUTPATIENT
Start: 2021-06-04 | End: 2021-06-05 | Stop reason: HOSPADM

## 2021-06-04 RX ORDER — IPRATROPIUM BROMIDE AND ALBUTEROL SULFATE 2.5; .5 MG/3ML; MG/3ML
3 SOLUTION RESPIRATORY (INHALATION) EVERY 4 HOURS
Status: DISCONTINUED | OUTPATIENT
Start: 2021-06-04 | End: 2021-06-05

## 2021-06-04 RX ADMIN — ALLOPURINOL 100 MG: 100 TABLET ORAL at 09:06

## 2021-06-04 RX ADMIN — GUAIFENESIN AND DEXTROMETHORPHAN HYDROBROMIDE 1 TABLET: 600; 30 TABLET, EXTENDED RELEASE ORAL at 10:06

## 2021-06-04 RX ADMIN — IPRATROPIUM BROMIDE AND ALBUTEROL SULFATE 3 ML: .5; 2.5 SOLUTION RESPIRATORY (INHALATION) at 11:06

## 2021-06-04 RX ADMIN — IPRATROPIUM BROMIDE AND ALBUTEROL SULFATE 3 ML: .5; 2.5 SOLUTION RESPIRATORY (INHALATION) at 03:06

## 2021-06-04 RX ADMIN — AZITHROMYCIN MONOHYDRATE 500 MG: 250 TABLET ORAL at 06:06

## 2021-06-04 RX ADMIN — ASPIRIN 325 MG: 325 TABLET, COATED ORAL at 09:06

## 2021-06-04 RX ADMIN — BENZONATATE 100 MG: 100 CAPSULE ORAL at 09:06

## 2021-06-04 RX ADMIN — HEPARIN SODIUM 5000 UNITS: 5000 INJECTION INTRAVENOUS; SUBCUTANEOUS at 09:06

## 2021-06-04 RX ADMIN — IPRATROPIUM BROMIDE AND ALBUTEROL SULFATE 3 ML: .5; 2.5 SOLUTION RESPIRATORY (INHALATION) at 08:06

## 2021-06-04 RX ADMIN — PREDNISONE 60 MG: 20 TABLET ORAL at 03:06

## 2021-06-04 RX ADMIN — HEPARIN SODIUM 5000 UNITS: 5000 INJECTION INTRAVENOUS; SUBCUTANEOUS at 04:06

## 2021-06-04 RX ADMIN — MELATONIN TAB 3 MG 9 MG: 3 TAB at 09:06

## 2021-06-04 RX ADMIN — CLOPIDOGREL 75 MG: 75 TABLET, FILM COATED ORAL at 12:06

## 2021-06-04 RX ADMIN — DIPHENHYDRAMINE HYDROCHLORIDE 25 MG: 50 INJECTION, SOLUTION INTRAMUSCULAR; INTRAVENOUS at 05:06

## 2021-06-04 RX ADMIN — METOPROLOL SUCCINATE 100 MG: 100 TABLET, EXTENDED RELEASE ORAL at 12:06

## 2021-06-04 RX ADMIN — HYDROCODONE BITARTRATE AND ACETAMINOPHEN 1 TABLET: 5; 325 TABLET ORAL at 06:06

## 2021-06-04 RX ADMIN — IOHEXOL 75 ML: 350 INJECTION, SOLUTION INTRAVENOUS at 06:06

## 2021-06-04 RX ADMIN — BENZONATATE 100 MG: 100 CAPSULE ORAL at 04:06

## 2021-06-04 RX ADMIN — ALBUTEROL SULFATE 2.5 MG: 2.5 SOLUTION RESPIRATORY (INHALATION) at 08:06

## 2021-06-04 RX ADMIN — GUAIFENESIN AND DEXTROMETHORPHAN HYDROBROMIDE 1 TABLET: 600; 30 TABLET, EXTENDED RELEASE ORAL at 09:06

## 2021-06-04 RX ADMIN — ALLOPURINOL 100 MG: 100 TABLET ORAL at 12:06

## 2021-06-04 RX ADMIN — AMIODARONE HYDROCHLORIDE 300 MG: 200 TABLET ORAL at 12:06

## 2021-06-04 RX ADMIN — IPRATROPIUM BROMIDE AND ALBUTEROL SULFATE 3 ML: .5; 2.5 SOLUTION RESPIRATORY (INHALATION) at 04:06

## 2021-06-04 RX ADMIN — ATORVASTATIN CALCIUM 80 MG: 40 TABLET, FILM COATED ORAL at 09:06

## 2021-06-04 RX ADMIN — LISINOPRIL 20 MG: 20 TABLET ORAL at 09:06

## 2021-06-04 RX ADMIN — FUROSEMIDE 40 MG: 10 INJECTION, SOLUTION INTRAMUSCULAR; INTRAVENOUS at 10:06

## 2021-06-04 RX ADMIN — HUMAN ALBUMIN MICROSPHERES AND PERFLUTREN 0.66 MG: 10; .22 INJECTION, SOLUTION INTRAVENOUS at 01:06

## 2021-06-04 RX ADMIN — BENZONATATE 100 MG: 100 CAPSULE ORAL at 10:06

## 2021-06-05 VITALS
HEART RATE: 72 BPM | HEIGHT: 67 IN | OXYGEN SATURATION: 97 % | BODY MASS INDEX: 36.09 KG/M2 | SYSTOLIC BLOOD PRESSURE: 118 MMHG | DIASTOLIC BLOOD PRESSURE: 58 MMHG | RESPIRATION RATE: 18 BRPM | TEMPERATURE: 96 F | WEIGHT: 229.94 LBS

## 2021-06-05 LAB
ANION GAP SERPL CALC-SCNC: 14 MMOL/L (ref 8–16)
BASOPHILS # BLD AUTO: 0.01 K/UL (ref 0–0.2)
BASOPHILS NFR BLD: 0.1 % (ref 0–1.9)
BUN SERPL-MCNC: 27 MG/DL (ref 8–23)
CALCIUM SERPL-MCNC: 8.7 MG/DL (ref 8.7–10.5)
CHLORIDE SERPL-SCNC: 104 MMOL/L (ref 95–110)
CO2 SERPL-SCNC: 22 MMOL/L (ref 23–29)
CREAT SERPL-MCNC: 1.1 MG/DL (ref 0.5–1.4)
DIFFERENTIAL METHOD: NORMAL
EOSINOPHIL # BLD AUTO: 0 K/UL (ref 0–0.5)
EOSINOPHIL NFR BLD: 0.5 % (ref 0–8)
ERYTHROCYTE [DISTWIDTH] IN BLOOD BY AUTOMATED COUNT: 14.3 % (ref 11.5–14.5)
EST. GFR  (AFRICAN AMERICAN): >60 ML/MIN/1.73 M^2
EST. GFR  (NON AFRICAN AMERICAN): >60 ML/MIN/1.73 M^2
GLUCOSE SERPL-MCNC: 103 MG/DL (ref 70–110)
HCT VFR BLD AUTO: 43.7 % (ref 40–54)
HGB BLD-MCNC: 14.3 G/DL (ref 14–18)
IMM GRANULOCYTES # BLD AUTO: 0.02 K/UL (ref 0–0.04)
IMM GRANULOCYTES NFR BLD AUTO: 0.3 % (ref 0–0.5)
LYMPHOCYTES # BLD AUTO: 1.7 K/UL (ref 1–4.8)
LYMPHOCYTES NFR BLD: 23.6 % (ref 18–48)
MAGNESIUM SERPL-MCNC: 1.8 MG/DL (ref 1.6–2.6)
MCH RBC QN AUTO: 30.3 PG (ref 27–31)
MCHC RBC AUTO-ENTMCNC: 32.7 G/DL (ref 32–36)
MCV RBC AUTO: 93 FL (ref 82–98)
MONOCYTES # BLD AUTO: 0.7 K/UL (ref 0.3–1)
MONOCYTES NFR BLD: 9.3 % (ref 4–15)
NEUTROPHILS # BLD AUTO: 4.8 K/UL (ref 1.8–7.7)
NEUTROPHILS NFR BLD: 66.2 % (ref 38–73)
NRBC BLD-RTO: 0 /100 WBC
PHOSPHATE SERPL-MCNC: 3.3 MG/DL (ref 2.7–4.5)
PLATELET # BLD AUTO: 162 K/UL (ref 150–450)
PMV BLD AUTO: 10.9 FL (ref 9.2–12.9)
POCT GLUCOSE: 111 MG/DL (ref 70–110)
POCT GLUCOSE: 95 MG/DL (ref 70–110)
POCT GLUCOSE: 99 MG/DL (ref 70–110)
POTASSIUM SERPL-SCNC: 3.4 MMOL/L (ref 3.5–5.1)
RBC # BLD AUTO: 4.72 M/UL (ref 4.6–6.2)
SODIUM SERPL-SCNC: 140 MMOL/L (ref 136–145)
WBC # BLD AUTO: 7.29 K/UL (ref 3.9–12.7)

## 2021-06-05 PROCEDURE — 94761 N-INVAS EAR/PLS OXIMETRY MLT: CPT

## 2021-06-05 PROCEDURE — 84100 ASSAY OF PHOSPHORUS: CPT | Performed by: PHYSICIAN ASSISTANT

## 2021-06-05 PROCEDURE — 85025 COMPLETE CBC W/AUTO DIFF WBC: CPT | Performed by: PHYSICIAN ASSISTANT

## 2021-06-05 PROCEDURE — 99217 PR OBSERVATION CARE DISCHARGE: ICD-10-PCS | Mod: ,,, | Performed by: PHYSICIAN ASSISTANT

## 2021-06-05 PROCEDURE — 83735 ASSAY OF MAGNESIUM: CPT | Performed by: PHYSICIAN ASSISTANT

## 2021-06-05 PROCEDURE — 63600175 PHARM REV CODE 636 W HCPCS: Performed by: PHYSICIAN ASSISTANT

## 2021-06-05 PROCEDURE — 25000003 PHARM REV CODE 250: Performed by: PHYSICIAN ASSISTANT

## 2021-06-05 PROCEDURE — G0378 HOSPITAL OBSERVATION PER HR: HCPCS

## 2021-06-05 PROCEDURE — 96372 THER/PROPH/DIAG INJ SC/IM: CPT

## 2021-06-05 PROCEDURE — 80048 BASIC METABOLIC PNL TOTAL CA: CPT | Performed by: PHYSICIAN ASSISTANT

## 2021-06-05 PROCEDURE — 94640 AIRWAY INHALATION TREATMENT: CPT

## 2021-06-05 PROCEDURE — 27000221 HC OXYGEN, UP TO 24 HOURS

## 2021-06-05 PROCEDURE — 36415 COLL VENOUS BLD VENIPUNCTURE: CPT | Performed by: PHYSICIAN ASSISTANT

## 2021-06-05 PROCEDURE — 25000242 PHARM REV CODE 250 ALT 637 W/ HCPCS: Performed by: PHYSICIAN ASSISTANT

## 2021-06-05 PROCEDURE — 99217 PR OBSERVATION CARE DISCHARGE: CPT | Mod: ,,, | Performed by: PHYSICIAN ASSISTANT

## 2021-06-05 RX ORDER — ALBUTEROL SULFATE 90 UG/1
2 AEROSOL, METERED RESPIRATORY (INHALATION) ONCE
Status: COMPLETED | OUTPATIENT
Start: 2021-06-05 | End: 2021-06-05

## 2021-06-05 RX ORDER — IPRATROPIUM BROMIDE AND ALBUTEROL SULFATE 2.5; .5 MG/3ML; MG/3ML
3 SOLUTION RESPIRATORY (INHALATION) EVERY 4 HOURS PRN
Status: DISCONTINUED | OUTPATIENT
Start: 2021-06-05 | End: 2021-06-05 | Stop reason: HOSPADM

## 2021-06-05 RX ORDER — POTASSIUM CHLORIDE 20 MEQ/1
40 TABLET, EXTENDED RELEASE ORAL ONCE
Status: DISCONTINUED | OUTPATIENT
Start: 2021-06-05 | End: 2021-06-05 | Stop reason: HOSPADM

## 2021-06-05 RX ADMIN — BENZONATATE 100 MG: 100 CAPSULE ORAL at 08:06

## 2021-06-05 RX ADMIN — FUROSEMIDE 40 MG: 40 TABLET ORAL at 08:06

## 2021-06-05 RX ADMIN — ALBUTEROL SULFATE 2 PUFF: 108 INHALANT RESPIRATORY (INHALATION) at 04:06

## 2021-06-05 RX ADMIN — POLYETHYLENE GLYCOL 3350 17 G: 17 POWDER, FOR SOLUTION ORAL at 08:06

## 2021-06-05 RX ADMIN — IPRATROPIUM BROMIDE AND ALBUTEROL SULFATE 3 ML: .5; 2.5 SOLUTION RESPIRATORY (INHALATION) at 09:06

## 2021-06-05 RX ADMIN — HEPARIN SODIUM 5000 UNITS: 5000 INJECTION INTRAVENOUS; SUBCUTANEOUS at 07:06

## 2021-06-05 RX ADMIN — CLOPIDOGREL 75 MG: 75 TABLET, FILM COATED ORAL at 08:06

## 2021-06-05 RX ADMIN — METOPROLOL SUCCINATE 100 MG: 100 TABLET, EXTENDED RELEASE ORAL at 08:06

## 2021-06-05 RX ADMIN — GUAIFENESIN AND DEXTROMETHORPHAN HYDROBROMIDE 1 TABLET: 600; 30 TABLET, EXTENDED RELEASE ORAL at 08:06

## 2021-06-05 RX ADMIN — ALLOPURINOL 100 MG: 100 TABLET ORAL at 08:06

## 2021-06-05 RX ADMIN — IPRATROPIUM BROMIDE AND ALBUTEROL SULFATE 3 ML: .5; 2.5 SOLUTION RESPIRATORY (INHALATION) at 11:06

## 2021-06-05 RX ADMIN — AMIODARONE HYDROCHLORIDE 300 MG: 200 TABLET ORAL at 12:06

## 2021-06-05 RX ADMIN — IPRATROPIUM BROMIDE AND ALBUTEROL SULFATE 3 ML: .5; 2.5 SOLUTION RESPIRATORY (INHALATION) at 03:06

## 2021-06-08 ENCOUNTER — HOSPITAL ENCOUNTER (INPATIENT)
Facility: HOSPITAL | Age: 71
LOS: 5 days | Discharge: HOME | DRG: 286 | End: 2021-06-15
Attending: EMERGENCY MEDICINE | Admitting: INTERNAL MEDICINE
Payer: MEDICARE

## 2021-06-08 DIAGNOSIS — I50.42 CHRONIC COMBINED SYSTOLIC AND DIASTOLIC CONGESTIVE HEART FAILURE: ICD-10-CM

## 2021-06-08 DIAGNOSIS — I50.9 CONGESTIVE HEART FAILURE, UNSPECIFIED HF CHRONICITY, UNSPECIFIED HEART FAILURE TYPE: Primary | ICD-10-CM

## 2021-06-08 DIAGNOSIS — R07.9 CHEST PAIN: ICD-10-CM

## 2021-06-08 DIAGNOSIS — R06.02 SOB (SHORTNESS OF BREATH): ICD-10-CM

## 2021-06-08 LAB
BASOPHILS # BLD AUTO: 0.03 K/UL (ref 0–0.2)
BASOPHILS NFR BLD: 0.5 % (ref 0–1.9)
BNP SERPL-MCNC: 161 PG/ML (ref 0–99)
CTP QC/QA: YES
DIFFERENTIAL METHOD: ABNORMAL
EOSINOPHIL # BLD AUTO: 0.2 K/UL (ref 0–0.5)
EOSINOPHIL NFR BLD: 2.9 % (ref 0–8)
ERYTHROCYTE [DISTWIDTH] IN BLOOD BY AUTOMATED COUNT: 13.7 % (ref 11.5–14.5)
HCT VFR BLD AUTO: 40.8 % (ref 40–54)
HGB BLD-MCNC: 13.4 G/DL (ref 14–18)
IMM GRANULOCYTES # BLD AUTO: 0.02 K/UL (ref 0–0.04)
IMM GRANULOCYTES NFR BLD AUTO: 0.4 % (ref 0–0.5)
LYMPHOCYTES # BLD AUTO: 1.9 K/UL (ref 1–4.8)
LYMPHOCYTES NFR BLD: 33.9 % (ref 18–48)
MCH RBC QN AUTO: 30.4 PG (ref 27–31)
MCHC RBC AUTO-ENTMCNC: 32.8 G/DL (ref 32–36)
MCV RBC AUTO: 93 FL (ref 82–98)
MONOCYTES # BLD AUTO: 0.5 K/UL (ref 0.3–1)
MONOCYTES NFR BLD: 9.4 % (ref 4–15)
NEUTROPHILS # BLD AUTO: 3 K/UL (ref 1.8–7.7)
NEUTROPHILS NFR BLD: 52.9 % (ref 38–73)
NRBC BLD-RTO: 0 /100 WBC
PLATELET # BLD AUTO: 226 K/UL (ref 150–450)
PMV BLD AUTO: 11.8 FL (ref 9.2–12.9)
RBC # BLD AUTO: 4.41 M/UL (ref 4.6–6.2)
SARS-COV-2 RDRP RESP QL NAA+PROBE: NEGATIVE
TROPONIN I SERPL DL<=0.01 NG/ML-MCNC: 0.04 NG/ML (ref 0–0.03)
WBC # BLD AUTO: 5.61 K/UL (ref 3.9–12.7)

## 2021-06-08 PROCEDURE — 96374 THER/PROPH/DIAG INJ IV PUSH: CPT

## 2021-06-08 PROCEDURE — 83880 ASSAY OF NATRIURETIC PEPTIDE: CPT | Performed by: EMERGENCY MEDICINE

## 2021-06-08 PROCEDURE — 99285 PR EMERGENCY DEPT VISIT,LEVEL V: ICD-10-PCS | Mod: CS,,, | Performed by: PHYSICIAN ASSISTANT

## 2021-06-08 PROCEDURE — 99285 EMERGENCY DEPT VISIT HI MDM: CPT | Mod: 25

## 2021-06-08 PROCEDURE — 94761 N-INVAS EAR/PLS OXIMETRY MLT: CPT

## 2021-06-08 PROCEDURE — 63600175 PHARM REV CODE 636 W HCPCS: Performed by: PHYSICIAN ASSISTANT

## 2021-06-08 PROCEDURE — 99285 EMERGENCY DEPT VISIT HI MDM: CPT | Mod: CS,,, | Performed by: PHYSICIAN ASSISTANT

## 2021-06-08 PROCEDURE — 84484 ASSAY OF TROPONIN QUANT: CPT | Performed by: EMERGENCY MEDICINE

## 2021-06-08 PROCEDURE — 93010 EKG 12-LEAD: ICD-10-PCS | Mod: ,,, | Performed by: INTERNAL MEDICINE

## 2021-06-08 PROCEDURE — U0002 COVID-19 LAB TEST NON-CDC: HCPCS | Performed by: PHYSICIAN ASSISTANT

## 2021-06-08 PROCEDURE — 93010 ELECTROCARDIOGRAM REPORT: CPT | Mod: ,,, | Performed by: INTERNAL MEDICINE

## 2021-06-08 PROCEDURE — 94640 AIRWAY INHALATION TREATMENT: CPT

## 2021-06-08 PROCEDURE — 93005 ELECTROCARDIOGRAM TRACING: CPT

## 2021-06-08 PROCEDURE — 25000242 PHARM REV CODE 250 ALT 637 W/ HCPCS: Performed by: PHYSICIAN ASSISTANT

## 2021-06-08 PROCEDURE — 85025 COMPLETE CBC W/AUTO DIFF WBC: CPT | Performed by: EMERGENCY MEDICINE

## 2021-06-08 RX ORDER — IPRATROPIUM BROMIDE AND ALBUTEROL SULFATE 2.5; .5 MG/3ML; MG/3ML
3 SOLUTION RESPIRATORY (INHALATION)
Status: COMPLETED | OUTPATIENT
Start: 2021-06-08 | End: 2021-06-08

## 2021-06-08 RX ORDER — FUROSEMIDE 10 MG/ML
80 INJECTION INTRAMUSCULAR; INTRAVENOUS
Status: COMPLETED | OUTPATIENT
Start: 2021-06-08 | End: 2021-06-08

## 2021-06-08 RX ADMIN — IPRATROPIUM BROMIDE AND ALBUTEROL SULFATE 3 ML: .5; 2.5 SOLUTION RESPIRATORY (INHALATION) at 11:06

## 2021-06-08 RX ADMIN — FUROSEMIDE 80 MG: 10 INJECTION, SOLUTION INTRAMUSCULAR; INTRAVENOUS at 11:06

## 2021-06-09 ENCOUNTER — PATIENT OUTREACH (OUTPATIENT)
Dept: ADMINISTRATIVE | Facility: OTHER | Age: 71
End: 2021-06-09

## 2021-06-09 PROBLEM — I50.9 CONGESTIVE HEART FAILURE: Status: ACTIVE | Noted: 2021-06-09

## 2021-06-09 LAB
ALBUMIN SERPL BCP-MCNC: 4 G/DL (ref 3.5–5.2)
ALBUMIN SERPL BCP-MCNC: 4 G/DL (ref 3.5–5.2)
ALP SERPL-CCNC: 76 U/L (ref 55–135)
ALP SERPL-CCNC: 84 U/L (ref 55–135)
ALT SERPL W/O P-5'-P-CCNC: 37 U/L (ref 10–44)
ALT SERPL W/O P-5'-P-CCNC: 38 U/L (ref 10–44)
ANION GAP SERPL CALC-SCNC: 11 MMOL/L (ref 8–16)
ANION GAP SERPL CALC-SCNC: 16 MMOL/L (ref 8–16)
AST SERPL-CCNC: 29 U/L (ref 10–40)
AST SERPL-CCNC: 31 U/L (ref 10–40)
BASOPHILS # BLD AUTO: 0.04 K/UL (ref 0–0.2)
BASOPHILS NFR BLD: 0.5 % (ref 0–1.9)
BILIRUB SERPL-MCNC: 0.5 MG/DL (ref 0.1–1)
BILIRUB SERPL-MCNC: 0.6 MG/DL (ref 0.1–1)
BILIRUB UR QL STRIP: NEGATIVE
BILIRUB UR QL STRIP: NEGATIVE
BUN SERPL-MCNC: 22 MG/DL (ref 8–23)
BUN SERPL-MCNC: 22 MG/DL (ref 8–23)
BUN SERPL-MCNC: 23 MG/DL (ref 6–30)
CALCIUM SERPL-MCNC: 9.3 MG/DL (ref 8.7–10.5)
CALCIUM SERPL-MCNC: 9.7 MG/DL (ref 8.7–10.5)
CHLORIDE SERPL-SCNC: 105 MMOL/L (ref 95–110)
CHLORIDE SERPL-SCNC: 106 MMOL/L (ref 95–110)
CHLORIDE SERPL-SCNC: 107 MMOL/L (ref 95–110)
CLARITY UR REFRACT.AUTO: CLEAR
CLARITY UR REFRACT.AUTO: CLEAR
CO2 SERPL-SCNC: 19 MMOL/L (ref 23–29)
CO2 SERPL-SCNC: 24 MMOL/L (ref 23–29)
COLOR UR AUTO: COLORLESS
COLOR UR AUTO: NORMAL
CREAT SERPL-MCNC: 1.2 MG/DL (ref 0.5–1.4)
CREAT SERPL-MCNC: 1.2 MG/DL (ref 0.5–1.4)
CREAT SERPL-MCNC: 1.3 MG/DL (ref 0.5–1.4)
DIFFERENTIAL METHOD: ABNORMAL
EOSINOPHIL # BLD AUTO: 0.1 K/UL (ref 0–0.5)
EOSINOPHIL NFR BLD: 1.7 % (ref 0–8)
ERYTHROCYTE [DISTWIDTH] IN BLOOD BY AUTOMATED COUNT: 13.8 % (ref 11.5–14.5)
EST. GFR  (AFRICAN AMERICAN): >60 ML/MIN/1.73 M^2
EST. GFR  (AFRICAN AMERICAN): >60 ML/MIN/1.73 M^2
EST. GFR  (NON AFRICAN AMERICAN): 55.7 ML/MIN/1.73 M^2
EST. GFR  (NON AFRICAN AMERICAN): >60 ML/MIN/1.73 M^2
ESTIMATED AVG GLUCOSE: 117 MG/DL (ref 68–131)
GLUCOSE SERPL-MCNC: 102 MG/DL (ref 70–110)
GLUCOSE SERPL-MCNC: 111 MG/DL (ref 70–110)
GLUCOSE SERPL-MCNC: 111 MG/DL (ref 70–110)
GLUCOSE UR QL STRIP: NEGATIVE
GLUCOSE UR QL STRIP: NEGATIVE
HBA1C MFR BLD: 5.7 % (ref 4–5.6)
HCT VFR BLD AUTO: 45.6 % (ref 40–54)
HCT VFR BLD CALC: 45 %PCV (ref 36–54)
HGB BLD-MCNC: 15 G/DL (ref 14–18)
HGB UR QL STRIP: NEGATIVE
HGB UR QL STRIP: NEGATIVE
IMM GRANULOCYTES # BLD AUTO: 0.06 K/UL (ref 0–0.04)
IMM GRANULOCYTES NFR BLD AUTO: 0.8 % (ref 0–0.5)
KETONES UR QL STRIP: NEGATIVE
KETONES UR QL STRIP: NEGATIVE
LACTATE SERPL-SCNC: 1.8 MMOL/L (ref 0.5–2.2)
LEUKOCYTE ESTERASE UR QL STRIP: NEGATIVE
LEUKOCYTE ESTERASE UR QL STRIP: NEGATIVE
LYMPHOCYTES # BLD AUTO: 2.1 K/UL (ref 1–4.8)
LYMPHOCYTES NFR BLD: 28.2 % (ref 18–48)
MAGNESIUM SERPL-MCNC: 1.9 MG/DL (ref 1.6–2.6)
MCH RBC QN AUTO: 30.5 PG (ref 27–31)
MCHC RBC AUTO-ENTMCNC: 32.9 G/DL (ref 32–36)
MCV RBC AUTO: 93 FL (ref 82–98)
MONOCYTES # BLD AUTO: 0.7 K/UL (ref 0.3–1)
MONOCYTES NFR BLD: 8.8 % (ref 4–15)
NEUTROPHILS # BLD AUTO: 4.6 K/UL (ref 1.8–7.7)
NEUTROPHILS NFR BLD: 60 % (ref 38–73)
NITRITE UR QL STRIP: NEGATIVE
NITRITE UR QL STRIP: NEGATIVE
NRBC BLD-RTO: 0 /100 WBC
PH UR STRIP: 5 [PH] (ref 5–8)
PH UR STRIP: 6 [PH] (ref 5–8)
PHOSPHATE SERPL-MCNC: 4 MG/DL (ref 2.7–4.5)
PLATELET # BLD AUTO: 190 K/UL (ref 150–450)
PMV BLD AUTO: 10.4 FL (ref 9.2–12.9)
POC IONIZED CALCIUM: 1.14 MMOL/L (ref 1.06–1.42)
POC TCO2 (MEASURED): 29 MMOL/L (ref 23–29)
POCT GLUCOSE: 110 MG/DL (ref 70–110)
POTASSIUM BLD-SCNC: 3.9 MMOL/L (ref 3.5–5.1)
POTASSIUM SERPL-SCNC: 3.9 MMOL/L (ref 3.5–5.1)
POTASSIUM SERPL-SCNC: 4 MMOL/L (ref 3.5–5.1)
PROCALCITONIN SERPL IA-MCNC: 0.07 NG/ML
PROT SERPL-MCNC: 7.1 G/DL (ref 6–8.4)
PROT SERPL-MCNC: 7.1 G/DL (ref 6–8.4)
PROT UR QL STRIP: NEGATIVE
PROT UR QL STRIP: NEGATIVE
RBC # BLD AUTO: 4.92 M/UL (ref 4.6–6.2)
SAMPLE: ABNORMAL
SODIUM BLD-SCNC: 144 MMOL/L (ref 136–145)
SODIUM SERPL-SCNC: 141 MMOL/L (ref 136–145)
SODIUM SERPL-SCNC: 142 MMOL/L (ref 136–145)
SP GR UR STRIP: 1 (ref 1–1.03)
SP GR UR STRIP: 1 (ref 1–1.03)
TROPONIN I SERPL DL<=0.01 NG/ML-MCNC: 0.05 NG/ML (ref 0–0.03)
URN SPEC COLLECT METH UR: ABNORMAL
URN SPEC COLLECT METH UR: NORMAL
WBC # BLD AUTO: 7.59 K/UL (ref 3.9–12.7)

## 2021-06-09 PROCEDURE — 84100 ASSAY OF PHOSPHORUS: CPT | Performed by: PHYSICIAN ASSISTANT

## 2021-06-09 PROCEDURE — 25000242 PHARM REV CODE 250 ALT 637 W/ HCPCS: Performed by: PHYSICIAN ASSISTANT

## 2021-06-09 PROCEDURE — 99900035 HC TECH TIME PER 15 MIN (STAT)

## 2021-06-09 PROCEDURE — G0378 HOSPITAL OBSERVATION PER HR: HCPCS

## 2021-06-09 PROCEDURE — 94664 DEMO&/EVAL PT USE INHALER: CPT

## 2021-06-09 PROCEDURE — 63600175 PHARM REV CODE 636 W HCPCS: Performed by: PHYSICIAN ASSISTANT

## 2021-06-09 PROCEDURE — 25000003 PHARM REV CODE 250: Performed by: PHYSICIAN ASSISTANT

## 2021-06-09 PROCEDURE — 99220 PR INITIAL OBSERVATION CARE,LEVL III: CPT | Mod: ,,, | Performed by: PHYSICIAN ASSISTANT

## 2021-06-09 PROCEDURE — 96372 THER/PROPH/DIAG INJ SC/IM: CPT | Mod: 59

## 2021-06-09 PROCEDURE — 94640 AIRWAY INHALATION TREATMENT: CPT

## 2021-06-09 PROCEDURE — 83036 HEMOGLOBIN GLYCOSYLATED A1C: CPT | Performed by: PHYSICIAN ASSISTANT

## 2021-06-09 PROCEDURE — 81003 URINALYSIS AUTO W/O SCOPE: CPT | Mod: 91 | Performed by: PHYSICIAN ASSISTANT

## 2021-06-09 PROCEDURE — 83735 ASSAY OF MAGNESIUM: CPT | Performed by: PHYSICIAN ASSISTANT

## 2021-06-09 PROCEDURE — 80053 COMPREHEN METABOLIC PANEL: CPT | Mod: 91 | Performed by: EMERGENCY MEDICINE

## 2021-06-09 PROCEDURE — 80053 COMPREHEN METABOLIC PANEL: CPT | Performed by: PHYSICIAN ASSISTANT

## 2021-06-09 PROCEDURE — 27000646 HC AEROBIKA DEVICE

## 2021-06-09 PROCEDURE — 84145 PROCALCITONIN (PCT): CPT | Performed by: PHYSICIAN ASSISTANT

## 2021-06-09 PROCEDURE — 85025 COMPLETE CBC W/AUTO DIFF WBC: CPT | Performed by: EMERGENCY MEDICINE

## 2021-06-09 PROCEDURE — 99220 PR INITIAL OBSERVATION CARE,LEVL III: ICD-10-PCS | Mod: ,,, | Performed by: PHYSICIAN ASSISTANT

## 2021-06-09 PROCEDURE — 81003 URINALYSIS AUTO W/O SCOPE: CPT | Performed by: PHYSICIAN ASSISTANT

## 2021-06-09 PROCEDURE — 94761 N-INVAS EAR/PLS OXIMETRY MLT: CPT

## 2021-06-09 PROCEDURE — 83605 ASSAY OF LACTIC ACID: CPT | Performed by: PHYSICIAN ASSISTANT

## 2021-06-09 PROCEDURE — 84484 ASSAY OF TROPONIN QUANT: CPT | Performed by: PHYSICIAN ASSISTANT

## 2021-06-09 PROCEDURE — 96376 TX/PRO/DX INJ SAME DRUG ADON: CPT

## 2021-06-09 RX ORDER — HEPARIN SODIUM 5000 [USP'U]/ML
5000 INJECTION, SOLUTION INTRAVENOUS; SUBCUTANEOUS EVERY 8 HOURS
Status: DISCONTINUED | OUTPATIENT
Start: 2021-06-09 | End: 2021-06-13

## 2021-06-09 RX ORDER — ATORVASTATIN CALCIUM 20 MG/1
80 TABLET, FILM COATED ORAL NIGHTLY
Status: DISCONTINUED | OUTPATIENT
Start: 2021-06-09 | End: 2021-06-15 | Stop reason: HOSPADM

## 2021-06-09 RX ORDER — SODIUM CHLORIDE 0.9 % (FLUSH) 0.9 %
10 SYRINGE (ML) INJECTION
Status: DISCONTINUED | OUTPATIENT
Start: 2021-06-09 | End: 2021-06-15 | Stop reason: HOSPADM

## 2021-06-09 RX ORDER — IPRATROPIUM BROMIDE AND ALBUTEROL SULFATE 2.5; .5 MG/3ML; MG/3ML
3 SOLUTION RESPIRATORY (INHALATION) EVERY 4 HOURS PRN
Status: DISCONTINUED | OUTPATIENT
Start: 2021-06-09 | End: 2021-06-11

## 2021-06-09 RX ORDER — ACETAMINOPHEN 325 MG/1
650 TABLET ORAL EVERY 4 HOURS PRN
Status: DISCONTINUED | OUTPATIENT
Start: 2021-06-09 | End: 2021-06-15 | Stop reason: HOSPADM

## 2021-06-09 RX ORDER — GLUCAGON 1 MG
1 KIT INJECTION
Status: DISCONTINUED | OUTPATIENT
Start: 2021-06-09 | End: 2021-06-15 | Stop reason: HOSPADM

## 2021-06-09 RX ORDER — CLOPIDOGREL BISULFATE 75 MG/1
75 TABLET ORAL DAILY
Status: DISCONTINUED | OUTPATIENT
Start: 2021-06-09 | End: 2021-06-15 | Stop reason: HOSPADM

## 2021-06-09 RX ORDER — IBUPROFEN 200 MG
24 TABLET ORAL
Status: DISCONTINUED | OUTPATIENT
Start: 2021-06-09 | End: 2021-06-15 | Stop reason: HOSPADM

## 2021-06-09 RX ORDER — LANOLIN ALCOHOL/MO/W.PET/CERES
400 CREAM (GRAM) TOPICAL ONCE
Status: COMPLETED | OUTPATIENT
Start: 2021-06-09 | End: 2021-06-09

## 2021-06-09 RX ORDER — PROMETHAZINE HYDROCHLORIDE 12.5 MG/1
25 TABLET ORAL EVERY 6 HOURS PRN
Status: DISCONTINUED | OUTPATIENT
Start: 2021-06-09 | End: 2021-06-15 | Stop reason: HOSPADM

## 2021-06-09 RX ORDER — BENZONATATE 100 MG/1
100 CAPSULE ORAL 3 TIMES DAILY
Status: DISCONTINUED | OUTPATIENT
Start: 2021-06-09 | End: 2021-06-15 | Stop reason: HOSPADM

## 2021-06-09 RX ORDER — LISINOPRIL 20 MG/1
20 TABLET ORAL NIGHTLY
Status: DISCONTINUED | OUTPATIENT
Start: 2021-06-09 | End: 2021-06-09

## 2021-06-09 RX ORDER — FUROSEMIDE 10 MG/ML
40 INJECTION INTRAMUSCULAR; INTRAVENOUS 3 TIMES DAILY
Status: DISCONTINUED | OUTPATIENT
Start: 2021-06-09 | End: 2021-06-11

## 2021-06-09 RX ORDER — AMOXICILLIN 250 MG
1 CAPSULE ORAL 2 TIMES DAILY
Status: DISCONTINUED | OUTPATIENT
Start: 2021-06-09 | End: 2021-06-15 | Stop reason: HOSPADM

## 2021-06-09 RX ORDER — IPRATROPIUM BROMIDE AND ALBUTEROL SULFATE 2.5; .5 MG/3ML; MG/3ML
3 SOLUTION RESPIRATORY (INHALATION) EVERY 6 HOURS
Status: DISCONTINUED | OUTPATIENT
Start: 2021-06-09 | End: 2021-06-12

## 2021-06-09 RX ORDER — ONDANSETRON 8 MG/1
8 TABLET, ORALLY DISINTEGRATING ORAL EVERY 8 HOURS PRN
Status: DISCONTINUED | OUTPATIENT
Start: 2021-06-09 | End: 2021-06-15 | Stop reason: HOSPADM

## 2021-06-09 RX ORDER — SODIUM CHLORIDE 0.9 % (FLUSH) 0.9 %
5 SYRINGE (ML) INJECTION
Status: DISCONTINUED | OUTPATIENT
Start: 2021-06-09 | End: 2021-06-15 | Stop reason: HOSPADM

## 2021-06-09 RX ORDER — BISACODYL 10 MG
10 SUPPOSITORY, RECTAL RECTAL DAILY PRN
Status: DISCONTINUED | OUTPATIENT
Start: 2021-06-09 | End: 2021-06-15 | Stop reason: HOSPADM

## 2021-06-09 RX ORDER — POLYETHYLENE GLYCOL 3350 17 G/17G
17 POWDER, FOR SOLUTION ORAL DAILY
Status: DISCONTINUED | OUTPATIENT
Start: 2021-06-09 | End: 2021-06-15 | Stop reason: HOSPADM

## 2021-06-09 RX ORDER — IBUPROFEN 200 MG
16 TABLET ORAL
Status: DISCONTINUED | OUTPATIENT
Start: 2021-06-09 | End: 2021-06-15 | Stop reason: HOSPADM

## 2021-06-09 RX ORDER — TALC
6 POWDER (GRAM) TOPICAL NIGHTLY PRN
Status: DISCONTINUED | OUTPATIENT
Start: 2021-06-09 | End: 2021-06-15 | Stop reason: HOSPADM

## 2021-06-09 RX ORDER — ALLOPURINOL 100 MG/1
100 TABLET ORAL 2 TIMES DAILY
Status: DISCONTINUED | OUTPATIENT
Start: 2021-06-09 | End: 2021-06-15 | Stop reason: HOSPADM

## 2021-06-09 RX ORDER — OXYCODONE HYDROCHLORIDE 5 MG/1
5 TABLET ORAL EVERY 6 HOURS PRN
Status: DISCONTINUED | OUTPATIENT
Start: 2021-06-09 | End: 2021-06-15 | Stop reason: HOSPADM

## 2021-06-09 RX ORDER — ASPIRIN 325 MG
325 TABLET, DELAYED RELEASE (ENTERIC COATED) ORAL NIGHTLY
Status: DISCONTINUED | OUTPATIENT
Start: 2021-06-09 | End: 2021-06-15 | Stop reason: HOSPADM

## 2021-06-09 RX ORDER — METOPROLOL SUCCINATE 50 MG/1
100 TABLET, EXTENDED RELEASE ORAL DAILY
Status: DISCONTINUED | OUTPATIENT
Start: 2021-06-09 | End: 2021-06-15 | Stop reason: HOSPADM

## 2021-06-09 RX ADMIN — IPRATROPIUM BROMIDE AND ALBUTEROL SULFATE 3 ML: .5; 2.5 SOLUTION RESPIRATORY (INHALATION) at 02:06

## 2021-06-09 RX ADMIN — HEPARIN SODIUM 5000 UNITS: 5000 INJECTION INTRAVENOUS; SUBCUTANEOUS at 06:06

## 2021-06-09 RX ADMIN — HEPARIN SODIUM 5000 UNITS: 5000 INJECTION INTRAVENOUS; SUBCUTANEOUS at 10:06

## 2021-06-09 RX ADMIN — METOPROLOL SUCCINATE 100 MG: 50 TABLET, EXTENDED RELEASE ORAL at 08:06

## 2021-06-09 RX ADMIN — FUROSEMIDE 40 MG: 10 INJECTION, SOLUTION INTRAMUSCULAR; INTRAVENOUS at 02:06

## 2021-06-09 RX ADMIN — ASPIRIN 325 MG: 325 TABLET, COATED ORAL at 10:06

## 2021-06-09 RX ADMIN — CLOPIDOGREL 75 MG: 75 TABLET, FILM COATED ORAL at 08:06

## 2021-06-09 RX ADMIN — ATORVASTATIN CALCIUM 80 MG: 40 TABLET, FILM COATED ORAL at 01:06

## 2021-06-09 RX ADMIN — GUAIFENESIN AND DEXTROMETHORPHAN HYDROBROMIDE 1 TABLET: 600; 30 TABLET, EXTENDED RELEASE ORAL at 10:06

## 2021-06-09 RX ADMIN — ATORVASTATIN CALCIUM 80 MG: 40 TABLET, FILM COATED ORAL at 10:06

## 2021-06-09 RX ADMIN — IPRATROPIUM BROMIDE AND ALBUTEROL SULFATE 3 ML: .5; 2.5 SOLUTION RESPIRATORY (INHALATION) at 08:06

## 2021-06-09 RX ADMIN — DOCUSATE SODIUM 50MG AND SENNOSIDES 8.6MG 1 TABLET: 8.6; 5 TABLET, FILM COATED ORAL at 10:06

## 2021-06-09 RX ADMIN — IPRATROPIUM BROMIDE AND ALBUTEROL SULFATE 3 ML: .5; 2.5 SOLUTION RESPIRATORY (INHALATION) at 01:06

## 2021-06-09 RX ADMIN — ALLOPURINOL 100 MG: 100 TABLET ORAL at 09:06

## 2021-06-09 RX ADMIN — FUROSEMIDE 40 MG: 10 INJECTION, SOLUTION INTRAMUSCULAR; INTRAVENOUS at 10:06

## 2021-06-09 RX ADMIN — OXYCODONE 5 MG: 5 TABLET ORAL at 10:06

## 2021-06-09 RX ADMIN — AMIODARONE HYDROCHLORIDE 300 MG: 200 TABLET ORAL at 08:06

## 2021-06-09 RX ADMIN — OXYCODONE 5 MG: 5 TABLET ORAL at 04:06

## 2021-06-09 RX ADMIN — IPRATROPIUM BROMIDE AND ALBUTEROL SULFATE 3 ML: .5; 2.5 SOLUTION RESPIRATORY (INHALATION) at 07:06

## 2021-06-09 RX ADMIN — Medication 400 MG: at 05:06

## 2021-06-09 RX ADMIN — FUROSEMIDE 40 MG: 10 INJECTION, SOLUTION INTRAMUSCULAR; INTRAVENOUS at 08:06

## 2021-06-09 RX ADMIN — HEPARIN SODIUM 5000 UNITS: 5000 INJECTION INTRAVENOUS; SUBCUTANEOUS at 02:06

## 2021-06-09 RX ADMIN — GUAIFENESIN AND DEXTROMETHORPHAN HYDROBROMIDE 1 TABLET: 600; 30 TABLET, EXTENDED RELEASE ORAL at 08:06

## 2021-06-09 RX ADMIN — LISINOPRIL 20 MG: 20 TABLET ORAL at 01:06

## 2021-06-09 RX ADMIN — ASPIRIN 325 MG: 325 TABLET, COATED ORAL at 01:06

## 2021-06-09 RX ADMIN — ALLOPURINOL 100 MG: 100 TABLET ORAL at 10:06

## 2021-06-10 ENCOUNTER — TELEPHONE (OUTPATIENT)
Dept: ELECTROPHYSIOLOGY | Facility: CLINIC | Age: 71
End: 2021-06-10

## 2021-06-10 PROBLEM — I50.9 CONGESTIVE HEART FAILURE: Status: ACTIVE | Noted: 2021-06-10

## 2021-06-10 LAB
ALBUMIN SERPL BCP-MCNC: 3.7 G/DL (ref 3.5–5.2)
ALP SERPL-CCNC: 70 U/L (ref 55–135)
ALT SERPL W/O P-5'-P-CCNC: 34 U/L (ref 10–44)
ANION GAP SERPL CALC-SCNC: 16 MMOL/L (ref 8–16)
AST SERPL-CCNC: 30 U/L (ref 10–40)
BASOPHILS # BLD AUTO: 0.05 K/UL (ref 0–0.2)
BASOPHILS NFR BLD: 0.8 % (ref 0–1.9)
BILIRUB SERPL-MCNC: 0.7 MG/DL (ref 0.1–1)
BUN SERPL-MCNC: 26 MG/DL (ref 8–23)
CALCIUM SERPL-MCNC: 8.9 MG/DL (ref 8.7–10.5)
CHLORIDE SERPL-SCNC: 102 MMOL/L (ref 95–110)
CO2 SERPL-SCNC: 25 MMOL/L (ref 23–29)
CREAT SERPL-MCNC: 1.4 MG/DL (ref 0.5–1.4)
DIFFERENTIAL METHOD: ABNORMAL
EOSINOPHIL # BLD AUTO: 0.2 K/UL (ref 0–0.5)
EOSINOPHIL NFR BLD: 2.9 % (ref 0–8)
ERYTHROCYTE [DISTWIDTH] IN BLOOD BY AUTOMATED COUNT: 13.9 % (ref 11.5–14.5)
EST. GFR  (AFRICAN AMERICAN): 58.8 ML/MIN/1.73 M^2
EST. GFR  (NON AFRICAN AMERICAN): 50.9 ML/MIN/1.73 M^2
GLUCOSE SERPL-MCNC: 95 MG/DL (ref 70–110)
HCT VFR BLD AUTO: 46 % (ref 40–54)
HGB BLD-MCNC: 14.9 G/DL (ref 14–18)
IMM GRANULOCYTES # BLD AUTO: 0.07 K/UL (ref 0–0.04)
IMM GRANULOCYTES NFR BLD AUTO: 1.1 % (ref 0–0.5)
LYMPHOCYTES # BLD AUTO: 2.5 K/UL (ref 1–4.8)
LYMPHOCYTES NFR BLD: 39.7 % (ref 18–48)
MAGNESIUM SERPL-MCNC: 1.8 MG/DL (ref 1.6–2.6)
MCH RBC QN AUTO: 30.7 PG (ref 27–31)
MCHC RBC AUTO-ENTMCNC: 32.4 G/DL (ref 32–36)
MCV RBC AUTO: 95 FL (ref 82–98)
MONOCYTES # BLD AUTO: 0.5 K/UL (ref 0.3–1)
MONOCYTES NFR BLD: 7.8 % (ref 4–15)
NEUTROPHILS # BLD AUTO: 3 K/UL (ref 1.8–7.7)
NEUTROPHILS NFR BLD: 47.7 % (ref 38–73)
NRBC BLD-RTO: 0 /100 WBC
PLATELET # BLD AUTO: 202 K/UL (ref 150–450)
PMV BLD AUTO: 11.2 FL (ref 9.2–12.9)
POTASSIUM SERPL-SCNC: 3.3 MMOL/L (ref 3.5–5.1)
PROT SERPL-MCNC: 6.5 G/DL (ref 6–8.4)
RBC # BLD AUTO: 4.86 M/UL (ref 4.6–6.2)
SODIUM SERPL-SCNC: 143 MMOL/L (ref 136–145)
WBC # BLD AUTO: 6.27 K/UL (ref 3.9–12.7)

## 2021-06-10 PROCEDURE — 63600175 PHARM REV CODE 636 W HCPCS: Performed by: PHYSICIAN ASSISTANT

## 2021-06-10 PROCEDURE — 36415 COLL VENOUS BLD VENIPUNCTURE: CPT | Performed by: PHYSICIAN ASSISTANT

## 2021-06-10 PROCEDURE — 99233 SBSQ HOSP IP/OBS HIGH 50: CPT | Mod: ,,, | Performed by: PHYSICIAN ASSISTANT

## 2021-06-10 PROCEDURE — 25000242 PHARM REV CODE 250 ALT 637 W/ HCPCS: Performed by: PHYSICIAN ASSISTANT

## 2021-06-10 PROCEDURE — 99900035 HC TECH TIME PER 15 MIN (STAT)

## 2021-06-10 PROCEDURE — 94640 AIRWAY INHALATION TREATMENT: CPT

## 2021-06-10 PROCEDURE — 85025 COMPLETE CBC W/AUTO DIFF WBC: CPT | Performed by: PHYSICIAN ASSISTANT

## 2021-06-10 PROCEDURE — 99233 PR SUBSEQUENT HOSPITAL CARE,LEVL III: ICD-10-PCS | Mod: ,,, | Performed by: PHYSICIAN ASSISTANT

## 2021-06-10 PROCEDURE — 94761 N-INVAS EAR/PLS OXIMETRY MLT: CPT

## 2021-06-10 PROCEDURE — 25000003 PHARM REV CODE 250: Performed by: PHYSICIAN ASSISTANT

## 2021-06-10 PROCEDURE — 83735 ASSAY OF MAGNESIUM: CPT | Performed by: PHYSICIAN ASSISTANT

## 2021-06-10 PROCEDURE — 80053 COMPREHEN METABOLIC PANEL: CPT | Performed by: PHYSICIAN ASSISTANT

## 2021-06-10 PROCEDURE — 96376 TX/PRO/DX INJ SAME DRUG ADON: CPT

## 2021-06-10 PROCEDURE — 20600001 HC STEP DOWN PRIVATE ROOM

## 2021-06-10 PROCEDURE — 94664 DEMO&/EVAL PT USE INHALER: CPT

## 2021-06-10 PROCEDURE — 96372 THER/PROPH/DIAG INJ SC/IM: CPT | Mod: 59

## 2021-06-10 RX ORDER — POTASSIUM CHLORIDE 20 MEQ/1
40 TABLET, EXTENDED RELEASE ORAL EVERY 4 HOURS
Status: COMPLETED | OUTPATIENT
Start: 2021-06-10 | End: 2021-06-10

## 2021-06-10 RX ORDER — PREDNISONE 20 MG/1
40 TABLET ORAL DAILY
Status: DISCONTINUED | OUTPATIENT
Start: 2021-06-11 | End: 2021-06-15 | Stop reason: HOSPADM

## 2021-06-10 RX ORDER — METHYLPREDNISOLONE SOD SUCC 125 MG
125 VIAL (EA) INJECTION ONCE
Status: COMPLETED | OUTPATIENT
Start: 2021-06-10 | End: 2021-06-10

## 2021-06-10 RX ADMIN — ATORVASTATIN CALCIUM 80 MG: 40 TABLET, FILM COATED ORAL at 09:06

## 2021-06-10 RX ADMIN — HEPARIN SODIUM 5000 UNITS: 5000 INJECTION INTRAVENOUS; SUBCUTANEOUS at 06:06

## 2021-06-10 RX ADMIN — ALLOPURINOL 100 MG: 100 TABLET ORAL at 08:06

## 2021-06-10 RX ADMIN — DOCUSATE SODIUM 50MG AND SENNOSIDES 8.6MG 1 TABLET: 8.6; 5 TABLET, FILM COATED ORAL at 09:06

## 2021-06-10 RX ADMIN — GUAIFENESIN AND DEXTROMETHORPHAN HYDROBROMIDE 1 TABLET: 600; 30 TABLET, EXTENDED RELEASE ORAL at 08:06

## 2021-06-10 RX ADMIN — METHYLPREDNISOLONE SODIUM SUCCINATE 125 MG: 125 INJECTION, POWDER, FOR SOLUTION INTRAMUSCULAR; INTRAVENOUS at 09:06

## 2021-06-10 RX ADMIN — HEPARIN SODIUM 5000 UNITS: 5000 INJECTION INTRAVENOUS; SUBCUTANEOUS at 09:06

## 2021-06-10 RX ADMIN — FUROSEMIDE 40 MG: 10 INJECTION, SOLUTION INTRAMUSCULAR; INTRAVENOUS at 09:06

## 2021-06-10 RX ADMIN — IPRATROPIUM BROMIDE AND ALBUTEROL SULFATE 3 ML: .5; 2.5 SOLUTION RESPIRATORY (INHALATION) at 12:06

## 2021-06-10 RX ADMIN — HEPARIN SODIUM 5000 UNITS: 5000 INJECTION INTRAVENOUS; SUBCUTANEOUS at 02:06

## 2021-06-10 RX ADMIN — POTASSIUM CHLORIDE 40 MEQ: 1500 TABLET, EXTENDED RELEASE ORAL at 08:06

## 2021-06-10 RX ADMIN — AMIODARONE HYDROCHLORIDE 300 MG: 200 TABLET ORAL at 08:06

## 2021-06-10 RX ADMIN — FUROSEMIDE 40 MG: 10 INJECTION, SOLUTION INTRAMUSCULAR; INTRAVENOUS at 08:06

## 2021-06-10 RX ADMIN — ALLOPURINOL 100 MG: 100 TABLET ORAL at 09:06

## 2021-06-10 RX ADMIN — POTASSIUM CHLORIDE 40 MEQ: 1500 TABLET, EXTENDED RELEASE ORAL at 10:06

## 2021-06-10 RX ADMIN — GUAIFENESIN AND DEXTROMETHORPHAN HYDROBROMIDE 1 TABLET: 600; 30 TABLET, EXTENDED RELEASE ORAL at 09:06

## 2021-06-10 RX ADMIN — METOPROLOL SUCCINATE 100 MG: 50 TABLET, EXTENDED RELEASE ORAL at 08:06

## 2021-06-10 RX ADMIN — IPRATROPIUM BROMIDE AND ALBUTEROL SULFATE 3 ML: .5; 2.5 SOLUTION RESPIRATORY (INHALATION) at 08:06

## 2021-06-10 RX ADMIN — CLOPIDOGREL 75 MG: 75 TABLET, FILM COATED ORAL at 08:06

## 2021-06-10 RX ADMIN — DOCUSATE SODIUM 50MG AND SENNOSIDES 8.6MG 1 TABLET: 8.6; 5 TABLET, FILM COATED ORAL at 08:06

## 2021-06-10 RX ADMIN — ASPIRIN 325 MG: 325 TABLET, COATED ORAL at 09:06

## 2021-06-10 RX ADMIN — OXYCODONE 5 MG: 5 TABLET ORAL at 04:06

## 2021-06-10 RX ADMIN — FUROSEMIDE 40 MG: 10 INJECTION, SOLUTION INTRAMUSCULAR; INTRAVENOUS at 02:06

## 2021-06-11 LAB
ALBUMIN SERPL BCP-MCNC: 4.1 G/DL (ref 3.5–5.2)
ALP SERPL-CCNC: 76 U/L (ref 55–135)
ALT SERPL W/O P-5'-P-CCNC: 40 U/L (ref 10–44)
ANION GAP SERPL CALC-SCNC: 19 MMOL/L (ref 8–16)
AST SERPL-CCNC: 36 U/L (ref 10–40)
BASOPHILS # BLD AUTO: 0.05 K/UL (ref 0–0.2)
BASOPHILS NFR BLD: 0.7 % (ref 0–1.9)
BILIRUB SERPL-MCNC: 0.7 MG/DL (ref 0.1–1)
BUN SERPL-MCNC: 34 MG/DL (ref 8–23)
CALCIUM SERPL-MCNC: 9.7 MG/DL (ref 8.7–10.5)
CHLORIDE SERPL-SCNC: 103 MMOL/L (ref 95–110)
CO2 SERPL-SCNC: 20 MMOL/L (ref 23–29)
CREAT SERPL-MCNC: 1.5 MG/DL (ref 0.5–1.4)
DIFFERENTIAL METHOD: ABNORMAL
EOSINOPHIL # BLD AUTO: 0 K/UL (ref 0–0.5)
EOSINOPHIL NFR BLD: 0.6 % (ref 0–8)
ERYTHROCYTE [DISTWIDTH] IN BLOOD BY AUTOMATED COUNT: 14 % (ref 11.5–14.5)
EST. GFR  (AFRICAN AMERICAN): 54.1 ML/MIN/1.73 M^2
EST. GFR  (NON AFRICAN AMERICAN): 46.8 ML/MIN/1.73 M^2
GLUCOSE SERPL-MCNC: 123 MG/DL (ref 70–110)
HCT VFR BLD AUTO: 49.6 % (ref 40–54)
HGB BLD-MCNC: 16 G/DL (ref 14–18)
IMM GRANULOCYTES # BLD AUTO: 0.09 K/UL (ref 0–0.04)
IMM GRANULOCYTES NFR BLD AUTO: 1.2 % (ref 0–0.5)
LYMPHOCYTES # BLD AUTO: 1 K/UL (ref 1–4.8)
LYMPHOCYTES NFR BLD: 14.4 % (ref 18–48)
MAGNESIUM SERPL-MCNC: 2.1 MG/DL (ref 1.6–2.6)
MCH RBC QN AUTO: 30.2 PG (ref 27–31)
MCHC RBC AUTO-ENTMCNC: 32.3 G/DL (ref 32–36)
MCV RBC AUTO: 94 FL (ref 82–98)
MONOCYTES # BLD AUTO: 0.1 K/UL (ref 0.3–1)
MONOCYTES NFR BLD: 1.1 % (ref 4–15)
NEUTROPHILS # BLD AUTO: 5.9 K/UL (ref 1.8–7.7)
NEUTROPHILS NFR BLD: 82 % (ref 38–73)
NRBC BLD-RTO: 0 /100 WBC
PLATELET # BLD AUTO: 231 K/UL (ref 150–450)
PMV BLD AUTO: 10.9 FL (ref 9.2–12.9)
POTASSIUM SERPL-SCNC: 4.8 MMOL/L (ref 3.5–5.1)
PROCALCITONIN SERPL IA-MCNC: 0.07 NG/ML
PROT SERPL-MCNC: 7.3 G/DL (ref 6–8.4)
RBC # BLD AUTO: 5.3 M/UL (ref 4.6–6.2)
SODIUM SERPL-SCNC: 142 MMOL/L (ref 136–145)
WBC # BLD AUTO: 7.23 K/UL (ref 3.9–12.7)

## 2021-06-11 PROCEDURE — 83735 ASSAY OF MAGNESIUM: CPT | Performed by: PHYSICIAN ASSISTANT

## 2021-06-11 PROCEDURE — 36415 COLL VENOUS BLD VENIPUNCTURE: CPT | Performed by: PHYSICIAN ASSISTANT

## 2021-06-11 PROCEDURE — 99233 PR SUBSEQUENT HOSPITAL CARE,LEVL III: ICD-10-PCS | Mod: ,,, | Performed by: PHYSICIAN ASSISTANT

## 2021-06-11 PROCEDURE — 25000003 PHARM REV CODE 250: Performed by: PHYSICIAN ASSISTANT

## 2021-06-11 PROCEDURE — 63600175 PHARM REV CODE 636 W HCPCS: Performed by: PHYSICIAN ASSISTANT

## 2021-06-11 PROCEDURE — 99900035 HC TECH TIME PER 15 MIN (STAT)

## 2021-06-11 PROCEDURE — 36415 COLL VENOUS BLD VENIPUNCTURE: CPT | Performed by: INTERNAL MEDICINE

## 2021-06-11 PROCEDURE — 85025 COMPLETE CBC W/AUTO DIFF WBC: CPT | Performed by: PHYSICIAN ASSISTANT

## 2021-06-11 PROCEDURE — 94664 DEMO&/EVAL PT USE INHALER: CPT

## 2021-06-11 PROCEDURE — 80053 COMPREHEN METABOLIC PANEL: CPT | Performed by: PHYSICIAN ASSISTANT

## 2021-06-11 PROCEDURE — 25000242 PHARM REV CODE 250 ALT 637 W/ HCPCS: Performed by: PHYSICIAN ASSISTANT

## 2021-06-11 PROCEDURE — 94640 AIRWAY INHALATION TREATMENT: CPT

## 2021-06-11 PROCEDURE — 84145 PROCALCITONIN (PCT): CPT | Performed by: INTERNAL MEDICINE

## 2021-06-11 PROCEDURE — 27000221 HC OXYGEN, UP TO 24 HOURS

## 2021-06-11 PROCEDURE — 99233 SBSQ HOSP IP/OBS HIGH 50: CPT | Mod: ,,, | Performed by: PHYSICIAN ASSISTANT

## 2021-06-11 PROCEDURE — 20600001 HC STEP DOWN PRIVATE ROOM

## 2021-06-11 PROCEDURE — 94761 N-INVAS EAR/PLS OXIMETRY MLT: CPT

## 2021-06-11 RX ORDER — FUROSEMIDE 10 MG/ML
80 INJECTION INTRAMUSCULAR; INTRAVENOUS ONCE
Status: COMPLETED | OUTPATIENT
Start: 2021-06-11 | End: 2021-06-11

## 2021-06-11 RX ADMIN — DOCUSATE SODIUM 50MG AND SENNOSIDES 8.6MG 1 TABLET: 8.6; 5 TABLET, FILM COATED ORAL at 09:06

## 2021-06-11 RX ADMIN — MELATONIN TAB 3 MG 6 MG: 3 TAB at 09:06

## 2021-06-11 RX ADMIN — IPRATROPIUM BROMIDE AND ALBUTEROL SULFATE 3 ML: .5; 2.5 SOLUTION RESPIRATORY (INHALATION) at 08:06

## 2021-06-11 RX ADMIN — IPRATROPIUM BROMIDE AND ALBUTEROL SULFATE 3 ML: .5; 2.5 SOLUTION RESPIRATORY (INHALATION) at 12:06

## 2021-06-11 RX ADMIN — HEPARIN SODIUM 5000 UNITS: 5000 INJECTION INTRAVENOUS; SUBCUTANEOUS at 06:06

## 2021-06-11 RX ADMIN — ALLOPURINOL 100 MG: 100 TABLET ORAL at 09:06

## 2021-06-11 RX ADMIN — FUROSEMIDE 80 MG: 10 INJECTION, SOLUTION INTRAMUSCULAR; INTRAVENOUS at 05:06

## 2021-06-11 RX ADMIN — METOPROLOL SUCCINATE 100 MG: 50 TABLET, EXTENDED RELEASE ORAL at 09:06

## 2021-06-11 RX ADMIN — HEPARIN SODIUM 5000 UNITS: 5000 INJECTION INTRAVENOUS; SUBCUTANEOUS at 09:06

## 2021-06-11 RX ADMIN — ATORVASTATIN CALCIUM 80 MG: 40 TABLET, FILM COATED ORAL at 09:06

## 2021-06-11 RX ADMIN — CLOPIDOGREL 75 MG: 75 TABLET, FILM COATED ORAL at 09:06

## 2021-06-11 RX ADMIN — GUAIFENESIN AND DEXTROMETHORPHAN HYDROBROMIDE 1 TABLET: 600; 30 TABLET, EXTENDED RELEASE ORAL at 09:06

## 2021-06-11 RX ADMIN — ASPIRIN 325 MG: 325 TABLET, COATED ORAL at 09:06

## 2021-06-11 RX ADMIN — HEPARIN SODIUM 5000 UNITS: 5000 INJECTION INTRAVENOUS; SUBCUTANEOUS at 03:06

## 2021-06-11 RX ADMIN — PREDNISONE 40 MG: 20 TABLET ORAL at 09:06

## 2021-06-11 RX ADMIN — AMIODARONE HYDROCHLORIDE 300 MG: 200 TABLET ORAL at 09:06

## 2021-06-11 RX ADMIN — OXYCODONE 5 MG: 5 TABLET ORAL at 09:06

## 2021-06-12 PROBLEM — J98.11 ATELECTASIS: Status: ACTIVE | Noted: 2021-06-12

## 2021-06-12 LAB
ALBUMIN SERPL BCP-MCNC: 3.9 G/DL (ref 3.5–5.2)
ALP SERPL-CCNC: 68 U/L (ref 55–135)
ALT SERPL W/O P-5'-P-CCNC: 28 U/L (ref 10–44)
ANION GAP SERPL CALC-SCNC: 17 MMOL/L (ref 8–16)
AST SERPL-CCNC: 22 U/L (ref 10–40)
BASOPHILS # BLD AUTO: 0.03 K/UL (ref 0–0.2)
BASOPHILS NFR BLD: 0.2 % (ref 0–1.9)
BILIRUB SERPL-MCNC: 0.6 MG/DL (ref 0.1–1)
BUN SERPL-MCNC: 50 MG/DL (ref 8–23)
CALCIUM SERPL-MCNC: 9.6 MG/DL (ref 8.7–10.5)
CHLORIDE SERPL-SCNC: 101 MMOL/L (ref 95–110)
CO2 SERPL-SCNC: 23 MMOL/L (ref 23–29)
CREAT SERPL-MCNC: 1.6 MG/DL (ref 0.5–1.4)
DIFFERENTIAL METHOD: ABNORMAL
EOSINOPHIL # BLD AUTO: 0 K/UL (ref 0–0.5)
EOSINOPHIL NFR BLD: 0 % (ref 0–8)
ERYTHROCYTE [DISTWIDTH] IN BLOOD BY AUTOMATED COUNT: 14.1 % (ref 11.5–14.5)
EST. GFR  (AFRICAN AMERICAN): 50.1 ML/MIN/1.73 M^2
EST. GFR  (NON AFRICAN AMERICAN): 43.3 ML/MIN/1.73 M^2
GLUCOSE SERPL-MCNC: 127 MG/DL (ref 70–110)
HCT VFR BLD AUTO: 47.1 % (ref 40–54)
HGB BLD-MCNC: 15.4 G/DL (ref 14–18)
IMM GRANULOCYTES # BLD AUTO: 0.12 K/UL (ref 0–0.04)
IMM GRANULOCYTES NFR BLD AUTO: 1 % (ref 0–0.5)
LYMPHOCYTES # BLD AUTO: 1.5 K/UL (ref 1–4.8)
LYMPHOCYTES NFR BLD: 12 % (ref 18–48)
MAGNESIUM SERPL-MCNC: 2.3 MG/DL (ref 1.6–2.6)
MCH RBC QN AUTO: 30.5 PG (ref 27–31)
MCHC RBC AUTO-ENTMCNC: 32.7 G/DL (ref 32–36)
MCV RBC AUTO: 93 FL (ref 82–98)
MONOCYTES # BLD AUTO: 0.9 K/UL (ref 0.3–1)
MONOCYTES NFR BLD: 7.1 % (ref 4–15)
NEUTROPHILS # BLD AUTO: 9.7 K/UL (ref 1.8–7.7)
NEUTROPHILS NFR BLD: 79.7 % (ref 38–73)
NRBC BLD-RTO: 0 /100 WBC
PLATELET # BLD AUTO: 254 K/UL (ref 150–450)
PMV BLD AUTO: 10.8 FL (ref 9.2–12.9)
POTASSIUM SERPL-SCNC: 4.1 MMOL/L (ref 3.5–5.1)
PROT SERPL-MCNC: 7.2 G/DL (ref 6–8.4)
RBC # BLD AUTO: 5.05 M/UL (ref 4.6–6.2)
SODIUM SERPL-SCNC: 141 MMOL/L (ref 136–145)
WBC # BLD AUTO: 12.11 K/UL (ref 3.9–12.7)

## 2021-06-12 PROCEDURE — 99223 1ST HOSP IP/OBS HIGH 75: CPT | Mod: ,,, | Performed by: INTERNAL MEDICINE

## 2021-06-12 PROCEDURE — 94761 N-INVAS EAR/PLS OXIMETRY MLT: CPT

## 2021-06-12 PROCEDURE — 99233 SBSQ HOSP IP/OBS HIGH 50: CPT | Mod: ,,, | Performed by: PHYSICIAN ASSISTANT

## 2021-06-12 PROCEDURE — 87070 CULTURE OTHR SPECIMN AEROBIC: CPT | Performed by: PHYSICIAN ASSISTANT

## 2021-06-12 PROCEDURE — 63600175 PHARM REV CODE 636 W HCPCS: Performed by: PHYSICIAN ASSISTANT

## 2021-06-12 PROCEDURE — 94640 AIRWAY INHALATION TREATMENT: CPT

## 2021-06-12 PROCEDURE — 25000003 PHARM REV CODE 250: Performed by: PHYSICIAN ASSISTANT

## 2021-06-12 PROCEDURE — 20600001 HC STEP DOWN PRIVATE ROOM

## 2021-06-12 PROCEDURE — 36415 COLL VENOUS BLD VENIPUNCTURE: CPT | Performed by: PHYSICIAN ASSISTANT

## 2021-06-12 PROCEDURE — 99900035 HC TECH TIME PER 15 MIN (STAT)

## 2021-06-12 PROCEDURE — 25000242 PHARM REV CODE 250 ALT 637 W/ HCPCS: Performed by: INTERNAL MEDICINE

## 2021-06-12 PROCEDURE — 85025 COMPLETE CBC W/AUTO DIFF WBC: CPT | Performed by: PHYSICIAN ASSISTANT

## 2021-06-12 PROCEDURE — 83735 ASSAY OF MAGNESIUM: CPT | Performed by: PHYSICIAN ASSISTANT

## 2021-06-12 PROCEDURE — 27000221 HC OXYGEN, UP TO 24 HOURS

## 2021-06-12 PROCEDURE — 99223 PR INITIAL HOSPITAL CARE,LEVL III: ICD-10-PCS | Mod: ,,, | Performed by: INTERNAL MEDICINE

## 2021-06-12 PROCEDURE — 87205 SMEAR GRAM STAIN: CPT | Performed by: PHYSICIAN ASSISTANT

## 2021-06-12 PROCEDURE — 80053 COMPREHEN METABOLIC PANEL: CPT | Performed by: PHYSICIAN ASSISTANT

## 2021-06-12 PROCEDURE — 99233 PR SUBSEQUENT HOSPITAL CARE,LEVL III: ICD-10-PCS | Mod: ,,, | Performed by: PHYSICIAN ASSISTANT

## 2021-06-12 PROCEDURE — 25000242 PHARM REV CODE 250 ALT 637 W/ HCPCS: Performed by: PHYSICIAN ASSISTANT

## 2021-06-12 RX ORDER — IPRATROPIUM BROMIDE AND ALBUTEROL SULFATE 2.5; .5 MG/3ML; MG/3ML
3 SOLUTION RESPIRATORY (INHALATION) EVERY 12 HOURS
Status: DISCONTINUED | OUTPATIENT
Start: 2021-06-12 | End: 2021-06-13

## 2021-06-12 RX ADMIN — GUAIFENESIN AND DEXTROMETHORPHAN HYDROBROMIDE 1 TABLET: 600; 30 TABLET, EXTENDED RELEASE ORAL at 08:06

## 2021-06-12 RX ADMIN — HEPARIN SODIUM 5000 UNITS: 5000 INJECTION INTRAVENOUS; SUBCUTANEOUS at 06:06

## 2021-06-12 RX ADMIN — IPRATROPIUM BROMIDE AND ALBUTEROL SULFATE 3 ML: .5; 2.5 SOLUTION RESPIRATORY (INHALATION) at 08:06

## 2021-06-12 RX ADMIN — HEPARIN SODIUM 5000 UNITS: 5000 INJECTION INTRAVENOUS; SUBCUTANEOUS at 02:06

## 2021-06-12 RX ADMIN — MELATONIN TAB 3 MG 6 MG: 3 TAB at 08:06

## 2021-06-12 RX ADMIN — IPRATROPIUM BROMIDE AND ALBUTEROL SULFATE 3 ML: .5; 2.5 SOLUTION RESPIRATORY (INHALATION) at 12:06

## 2021-06-12 RX ADMIN — METOPROLOL SUCCINATE 100 MG: 50 TABLET, EXTENDED RELEASE ORAL at 10:06

## 2021-06-12 RX ADMIN — PREDNISONE 40 MG: 20 TABLET ORAL at 10:06

## 2021-06-12 RX ADMIN — HEPARIN SODIUM 5000 UNITS: 5000 INJECTION INTRAVENOUS; SUBCUTANEOUS at 10:06

## 2021-06-12 RX ADMIN — ALLOPURINOL 100 MG: 100 TABLET ORAL at 10:06

## 2021-06-12 RX ADMIN — ATORVASTATIN CALCIUM 80 MG: 40 TABLET, FILM COATED ORAL at 08:06

## 2021-06-12 RX ADMIN — ASPIRIN 325 MG: 325 TABLET, COATED ORAL at 08:06

## 2021-06-12 RX ADMIN — ALLOPURINOL 100 MG: 100 TABLET ORAL at 08:06

## 2021-06-12 RX ADMIN — AMIODARONE HYDROCHLORIDE 300 MG: 200 TABLET ORAL at 10:06

## 2021-06-12 RX ADMIN — IPRATROPIUM BROMIDE AND ALBUTEROL SULFATE 3 ML: .5; 2.5 SOLUTION RESPIRATORY (INHALATION) at 11:06

## 2021-06-12 RX ADMIN — CLOPIDOGREL 75 MG: 75 TABLET, FILM COATED ORAL at 10:06

## 2021-06-12 RX ADMIN — GUAIFENESIN AND DEXTROMETHORPHAN HYDROBROMIDE 1 TABLET: 600; 30 TABLET, EXTENDED RELEASE ORAL at 10:06

## 2021-06-13 LAB
ALBUMIN SERPL BCP-MCNC: 3.7 G/DL (ref 3.5–5.2)
ALP SERPL-CCNC: 70 U/L (ref 55–135)
ALT SERPL W/O P-5'-P-CCNC: 34 U/L (ref 10–44)
ANION GAP SERPL CALC-SCNC: 15 MMOL/L (ref 8–16)
AST SERPL-CCNC: 30 U/L (ref 10–40)
BASOPHILS # BLD AUTO: 0.02 K/UL (ref 0–0.2)
BASOPHILS NFR BLD: 0.2 % (ref 0–1.9)
BILIRUB SERPL-MCNC: 0.5 MG/DL (ref 0.1–1)
BUN SERPL-MCNC: 50 MG/DL (ref 8–23)
CALCIUM SERPL-MCNC: 9.2 MG/DL (ref 8.7–10.5)
CHLORIDE SERPL-SCNC: 105 MMOL/L (ref 95–110)
CO2 SERPL-SCNC: 23 MMOL/L (ref 23–29)
CREAT SERPL-MCNC: 1.5 MG/DL (ref 0.5–1.4)
DIFFERENTIAL METHOD: ABNORMAL
EOSINOPHIL # BLD AUTO: 0 K/UL (ref 0–0.5)
EOSINOPHIL NFR BLD: 0.1 % (ref 0–8)
ERYTHROCYTE [DISTWIDTH] IN BLOOD BY AUTOMATED COUNT: 14.1 % (ref 11.5–14.5)
EST. GFR  (AFRICAN AMERICAN): 54.1 ML/MIN/1.73 M^2
EST. GFR  (NON AFRICAN AMERICAN): 46.8 ML/MIN/1.73 M^2
GLUCOSE SERPL-MCNC: 100 MG/DL (ref 70–110)
HCT VFR BLD AUTO: 46.2 % (ref 40–54)
HGB BLD-MCNC: 14.9 G/DL (ref 14–18)
IMM GRANULOCYTES # BLD AUTO: 0.13 K/UL (ref 0–0.04)
IMM GRANULOCYTES NFR BLD AUTO: 1.2 % (ref 0–0.5)
LYMPHOCYTES # BLD AUTO: 1.7 K/UL (ref 1–4.8)
LYMPHOCYTES NFR BLD: 16.1 % (ref 18–48)
MAGNESIUM SERPL-MCNC: 2.4 MG/DL (ref 1.6–2.6)
MCH RBC QN AUTO: 30.5 PG (ref 27–31)
MCHC RBC AUTO-ENTMCNC: 32.3 G/DL (ref 32–36)
MCV RBC AUTO: 95 FL (ref 82–98)
MONOCYTES # BLD AUTO: 1 K/UL (ref 0.3–1)
MONOCYTES NFR BLD: 9.6 % (ref 4–15)
NEUTROPHILS # BLD AUTO: 7.8 K/UL (ref 1.8–7.7)
NEUTROPHILS NFR BLD: 72.8 % (ref 38–73)
NRBC BLD-RTO: 0 /100 WBC
PLATELET # BLD AUTO: 263 K/UL (ref 150–450)
PMV BLD AUTO: 10.6 FL (ref 9.2–12.9)
POTASSIUM SERPL-SCNC: 4.1 MMOL/L (ref 3.5–5.1)
PROT SERPL-MCNC: 6.7 G/DL (ref 6–8.4)
RBC # BLD AUTO: 4.88 M/UL (ref 4.6–6.2)
SODIUM SERPL-SCNC: 143 MMOL/L (ref 136–145)
WBC # BLD AUTO: 10.74 K/UL (ref 3.9–12.7)

## 2021-06-13 PROCEDURE — 20600001 HC STEP DOWN PRIVATE ROOM

## 2021-06-13 PROCEDURE — 36415 COLL VENOUS BLD VENIPUNCTURE: CPT | Performed by: PHYSICIAN ASSISTANT

## 2021-06-13 PROCEDURE — 63600175 PHARM REV CODE 636 W HCPCS: Performed by: PHYSICIAN ASSISTANT

## 2021-06-13 PROCEDURE — 25000003 PHARM REV CODE 250: Performed by: PHYSICIAN ASSISTANT

## 2021-06-13 PROCEDURE — 85025 COMPLETE CBC W/AUTO DIFF WBC: CPT | Performed by: PHYSICIAN ASSISTANT

## 2021-06-13 PROCEDURE — 99232 PR SUBSEQUENT HOSPITAL CARE,LEVL II: ICD-10-PCS | Mod: ,,, | Performed by: INTERNAL MEDICINE

## 2021-06-13 PROCEDURE — 80053 COMPREHEN METABOLIC PANEL: CPT | Performed by: PHYSICIAN ASSISTANT

## 2021-06-13 PROCEDURE — 94640 AIRWAY INHALATION TREATMENT: CPT

## 2021-06-13 PROCEDURE — 99232 SBSQ HOSP IP/OBS MODERATE 35: CPT | Mod: ,,, | Performed by: INTERNAL MEDICINE

## 2021-06-13 PROCEDURE — 94761 N-INVAS EAR/PLS OXIMETRY MLT: CPT

## 2021-06-13 PROCEDURE — 25000242 PHARM REV CODE 250 ALT 637 W/ HCPCS: Performed by: INTERNAL MEDICINE

## 2021-06-13 PROCEDURE — 83735 ASSAY OF MAGNESIUM: CPT | Performed by: PHYSICIAN ASSISTANT

## 2021-06-13 PROCEDURE — 63600175 PHARM REV CODE 636 W HCPCS: Performed by: STUDENT IN AN ORGANIZED HEALTH CARE EDUCATION/TRAINING PROGRAM

## 2021-06-13 RX ORDER — IPRATROPIUM BROMIDE AND ALBUTEROL SULFATE 2.5; .5 MG/3ML; MG/3ML
3 SOLUTION RESPIRATORY (INHALATION) EVERY 6 HOURS PRN
Status: DISCONTINUED | OUTPATIENT
Start: 2021-06-13 | End: 2021-06-14

## 2021-06-13 RX ORDER — HEPARIN SODIUM 5000 [USP'U]/ML
5000 INJECTION, SOLUTION INTRAVENOUS; SUBCUTANEOUS EVERY 8 HOURS
Status: COMPLETED | OUTPATIENT
Start: 2021-06-13 | End: 2021-06-13

## 2021-06-13 RX ADMIN — HEPARIN SODIUM 5000 UNITS: 5000 INJECTION INTRAVENOUS; SUBCUTANEOUS at 05:06

## 2021-06-13 RX ADMIN — MELATONIN TAB 3 MG 6 MG: 3 TAB at 08:06

## 2021-06-13 RX ADMIN — HEPARIN SODIUM 5000 UNITS: 5000 INJECTION INTRAVENOUS; SUBCUTANEOUS at 02:06

## 2021-06-13 RX ADMIN — GUAIFENESIN AND DEXTROMETHORPHAN HYDROBROMIDE 1 TABLET: 600; 30 TABLET, EXTENDED RELEASE ORAL at 08:06

## 2021-06-13 RX ADMIN — IPRATROPIUM BROMIDE AND ALBUTEROL SULFATE 3 ML: .5; 2.5 SOLUTION RESPIRATORY (INHALATION) at 08:06

## 2021-06-13 RX ADMIN — PREDNISONE 40 MG: 20 TABLET ORAL at 09:06

## 2021-06-13 RX ADMIN — ALLOPURINOL 100 MG: 100 TABLET ORAL at 09:06

## 2021-06-13 RX ADMIN — AMIODARONE HYDROCHLORIDE 300 MG: 200 TABLET ORAL at 09:06

## 2021-06-13 RX ADMIN — HEPARIN SODIUM 5000 UNITS: 5000 INJECTION INTRAVENOUS; SUBCUTANEOUS at 09:06

## 2021-06-13 RX ADMIN — CLOPIDOGREL 75 MG: 75 TABLET, FILM COATED ORAL at 09:06

## 2021-06-13 RX ADMIN — ALLOPURINOL 100 MG: 100 TABLET ORAL at 08:06

## 2021-06-13 RX ADMIN — GUAIFENESIN AND DEXTROMETHORPHAN HYDROBROMIDE 1 TABLET: 600; 30 TABLET, EXTENDED RELEASE ORAL at 09:06

## 2021-06-13 RX ADMIN — ATORVASTATIN CALCIUM 80 MG: 40 TABLET, FILM COATED ORAL at 08:06

## 2021-06-13 RX ADMIN — METOPROLOL SUCCINATE 100 MG: 50 TABLET, EXTENDED RELEASE ORAL at 09:06

## 2021-06-13 RX ADMIN — OXYCODONE 5 MG: 5 TABLET ORAL at 02:06

## 2021-06-13 RX ADMIN — ASPIRIN 325 MG: 325 TABLET, COATED ORAL at 08:06

## 2021-06-14 LAB
ALBUMIN SERPL BCP-MCNC: 3.5 G/DL (ref 3.5–5.2)
ALP SERPL-CCNC: 71 U/L (ref 55–135)
ALT SERPL W/O P-5'-P-CCNC: 37 U/L (ref 10–44)
ANION GAP SERPL CALC-SCNC: 14 MMOL/L (ref 8–16)
AST SERPL-CCNC: 30 U/L (ref 10–40)
BACTERIA SPEC AEROBE CULT: NORMAL
BACTERIA SPEC AEROBE CULT: NORMAL
BASOPHILS # BLD AUTO: 0.03 K/UL (ref 0–0.2)
BASOPHILS NFR BLD: 0.4 % (ref 0–1.9)
BILIRUB SERPL-MCNC: 0.5 MG/DL (ref 0.1–1)
BUN SERPL-MCNC: 37 MG/DL (ref 8–23)
CALCIUM SERPL-MCNC: 9.4 MG/DL (ref 8.7–10.5)
CHLORIDE SERPL-SCNC: 109 MMOL/L (ref 95–110)
CO2 SERPL-SCNC: 20 MMOL/L (ref 23–29)
CREAT SERPL-MCNC: 1.1 MG/DL (ref 0.5–1.4)
DIFFERENTIAL METHOD: ABNORMAL
EOSINOPHIL # BLD AUTO: 0 K/UL (ref 0–0.5)
EOSINOPHIL NFR BLD: 0.1 % (ref 0–8)
ERYTHROCYTE [DISTWIDTH] IN BLOOD BY AUTOMATED COUNT: 13.9 % (ref 11.5–14.5)
EST. GFR  (AFRICAN AMERICAN): >60 ML/MIN/1.73 M^2
EST. GFR  (NON AFRICAN AMERICAN): >60 ML/MIN/1.73 M^2
GLUCOSE SERPL-MCNC: 104 MG/DL (ref 70–110)
GRAM STN SPEC: NORMAL
HCT VFR BLD AUTO: 45 % (ref 40–54)
HGB BLD-MCNC: 14.3 G/DL (ref 14–18)
IMM GRANULOCYTES # BLD AUTO: 0.09 K/UL (ref 0–0.04)
IMM GRANULOCYTES NFR BLD AUTO: 1.1 % (ref 0–0.5)
LYMPHOCYTES # BLD AUTO: 1.5 K/UL (ref 1–4.8)
LYMPHOCYTES NFR BLD: 18.2 % (ref 18–48)
MAGNESIUM SERPL-MCNC: 2.4 MG/DL (ref 1.6–2.6)
MCH RBC QN AUTO: 30 PG (ref 27–31)
MCHC RBC AUTO-ENTMCNC: 31.8 G/DL (ref 32–36)
MCV RBC AUTO: 94 FL (ref 82–98)
MONOCYTES # BLD AUTO: 0.6 K/UL (ref 0.3–1)
MONOCYTES NFR BLD: 7.7 % (ref 4–15)
NEUTROPHILS # BLD AUTO: 6 K/UL (ref 1.8–7.7)
NEUTROPHILS NFR BLD: 72.5 % (ref 38–73)
NRBC BLD-RTO: 0 /100 WBC
PLATELET # BLD AUTO: 216 K/UL (ref 150–450)
PMV BLD AUTO: 11 FL (ref 9.2–12.9)
POTASSIUM SERPL-SCNC: 4.1 MMOL/L (ref 3.5–5.1)
PROT SERPL-MCNC: 6.3 G/DL (ref 6–8.4)
RBC # BLD AUTO: 4.77 M/UL (ref 4.6–6.2)
SARS-COV-2 RDRP RESP QL NAA+PROBE: NEGATIVE
SODIUM SERPL-SCNC: 143 MMOL/L (ref 136–145)
WBC # BLD AUTO: 8.31 K/UL (ref 3.9–12.7)

## 2021-06-14 PROCEDURE — 99232 PR SUBSEQUENT HOSPITAL CARE,LEVL II: ICD-10-PCS | Mod: ,,, | Performed by: INTERNAL MEDICINE

## 2021-06-14 PROCEDURE — 99232 SBSQ HOSP IP/OBS MODERATE 35: CPT | Mod: ,,, | Performed by: INTERNAL MEDICINE

## 2021-06-14 PROCEDURE — 94761 N-INVAS EAR/PLS OXIMETRY MLT: CPT

## 2021-06-14 PROCEDURE — 25000003 PHARM REV CODE 250: Performed by: INTERNAL MEDICINE

## 2021-06-14 PROCEDURE — U0002 COVID-19 LAB TEST NON-CDC: HCPCS | Performed by: INTERNAL MEDICINE

## 2021-06-14 PROCEDURE — 25000003 PHARM REV CODE 250: Performed by: PHYSICIAN ASSISTANT

## 2021-06-14 PROCEDURE — 99233 SBSQ HOSP IP/OBS HIGH 50: CPT | Mod: 25,,, | Performed by: INTERNAL MEDICINE

## 2021-06-14 PROCEDURE — 93451 RIGHT HEART CATH: CPT | Performed by: INTERNAL MEDICINE

## 2021-06-14 PROCEDURE — 85025 COMPLETE CBC W/AUTO DIFF WBC: CPT | Performed by: PHYSICIAN ASSISTANT

## 2021-06-14 PROCEDURE — 99233 PR SUBSEQUENT HOSPITAL CARE,LEVL III: ICD-10-PCS | Mod: 25,,, | Performed by: INTERNAL MEDICINE

## 2021-06-14 PROCEDURE — C1751 CATH, INF, PER/CENT/MIDLINE: HCPCS | Performed by: INTERNAL MEDICINE

## 2021-06-14 PROCEDURE — 80053 COMPREHEN METABOLIC PANEL: CPT | Performed by: PHYSICIAN ASSISTANT

## 2021-06-14 PROCEDURE — C1894 INTRO/SHEATH, NON-LASER: HCPCS | Performed by: INTERNAL MEDICINE

## 2021-06-14 PROCEDURE — 99900035 HC TECH TIME PER 15 MIN (STAT)

## 2021-06-14 PROCEDURE — 36415 COLL VENOUS BLD VENIPUNCTURE: CPT | Performed by: PHYSICIAN ASSISTANT

## 2021-06-14 PROCEDURE — 93451 PR RIGHT HEART CATH O2 SATURATION & CARDIAC OUTPUT: ICD-10-PCS | Mod: 26,,, | Performed by: INTERNAL MEDICINE

## 2021-06-14 PROCEDURE — 20600001 HC STEP DOWN PRIVATE ROOM

## 2021-06-14 PROCEDURE — 93451 RIGHT HEART CATH: CPT | Mod: 26,,, | Performed by: INTERNAL MEDICINE

## 2021-06-14 PROCEDURE — 83735 ASSAY OF MAGNESIUM: CPT | Performed by: PHYSICIAN ASSISTANT

## 2021-06-14 RX ORDER — FUROSEMIDE 10 MG/ML
40 INJECTION INTRAMUSCULAR; INTRAVENOUS DAILY
Status: DISCONTINUED | OUTPATIENT
Start: 2021-06-14 | End: 2021-06-14

## 2021-06-14 RX ORDER — SODIUM CHLORIDE 0.9 G/100ML
IRRIGANT IRRIGATION
Status: DISCONTINUED | OUTPATIENT
Start: 2021-06-14 | End: 2021-06-14 | Stop reason: HOSPADM

## 2021-06-14 RX ORDER — SACUBITRIL AND VALSARTAN 49; 51 MG/1; MG/1
1 TABLET, FILM COATED ORAL 2 TIMES DAILY
Qty: 60 TABLET | Refills: 2 | Status: SHIPPED | OUTPATIENT
Start: 2021-06-14 | End: 2021-06-15 | Stop reason: SDUPTHER

## 2021-06-14 RX ORDER — LIDOCAINE HYDROCHLORIDE 20 MG/ML
INJECTION, SOLUTION INFILTRATION; PERINEURAL
Status: DISCONTINUED | OUTPATIENT
Start: 2021-06-14 | End: 2021-06-14 | Stop reason: HOSPADM

## 2021-06-14 RX ORDER — LISINOPRIL 20 MG/1
20 TABLET ORAL DAILY
Status: DISCONTINUED | OUTPATIENT
Start: 2021-06-14 | End: 2021-06-14

## 2021-06-14 RX ADMIN — ALLOPURINOL 100 MG: 100 TABLET ORAL at 10:06

## 2021-06-14 RX ADMIN — ASPIRIN 325 MG: 325 TABLET, COATED ORAL at 10:06

## 2021-06-14 RX ADMIN — DOCUSATE SODIUM 50MG AND SENNOSIDES 8.6MG 1 TABLET: 8.6; 5 TABLET, FILM COATED ORAL at 10:06

## 2021-06-14 RX ADMIN — SACUBITRIL AND VALSARTAN 1 TABLET: 49; 51 TABLET, FILM COATED ORAL at 10:06

## 2021-06-14 RX ADMIN — GUAIFENESIN AND DEXTROMETHORPHAN HYDROBROMIDE 1 TABLET: 600; 30 TABLET, EXTENDED RELEASE ORAL at 10:06

## 2021-06-14 RX ADMIN — ATORVASTATIN CALCIUM 80 MG: 40 TABLET, FILM COATED ORAL at 10:06

## 2021-06-15 VITALS
WEIGHT: 223.13 LBS | DIASTOLIC BLOOD PRESSURE: 52 MMHG | HEART RATE: 64 BPM | HEIGHT: 67 IN | RESPIRATION RATE: 32 BRPM | SYSTOLIC BLOOD PRESSURE: 94 MMHG | BODY MASS INDEX: 35.02 KG/M2 | OXYGEN SATURATION: 90 % | TEMPERATURE: 98 F

## 2021-06-15 DIAGNOSIS — I50.22 CHRONIC SYSTOLIC CONGESTIVE HEART FAILURE: Primary | ICD-10-CM

## 2021-06-15 LAB
ALBUMIN SERPL BCP-MCNC: 3.4 G/DL (ref 3.5–5.2)
ALP SERPL-CCNC: 64 U/L (ref 55–135)
ALT SERPL W/O P-5'-P-CCNC: 48 U/L (ref 10–44)
ANION GAP SERPL CALC-SCNC: 13 MMOL/L (ref 8–16)
AST SERPL-CCNC: 39 U/L (ref 10–40)
BASOPHILS # BLD AUTO: 0.05 K/UL (ref 0–0.2)
BASOPHILS NFR BLD: 0.5 % (ref 0–1.9)
BILIRUB SERPL-MCNC: 0.9 MG/DL (ref 0.1–1)
BUN SERPL-MCNC: 25 MG/DL (ref 8–23)
CALCIUM SERPL-MCNC: 9 MG/DL (ref 8.7–10.5)
CHLORIDE SERPL-SCNC: 109 MMOL/L (ref 95–110)
CO2 SERPL-SCNC: 19 MMOL/L (ref 23–29)
CREAT SERPL-MCNC: 1 MG/DL (ref 0.5–1.4)
DIFFERENTIAL METHOD: ABNORMAL
EOSINOPHIL # BLD AUTO: 0.1 K/UL (ref 0–0.5)
EOSINOPHIL NFR BLD: 1.3 % (ref 0–8)
ERYTHROCYTE [DISTWIDTH] IN BLOOD BY AUTOMATED COUNT: 14 % (ref 11.5–14.5)
EST. GFR  (AFRICAN AMERICAN): >60 ML/MIN/1.73 M^2
EST. GFR  (NON AFRICAN AMERICAN): >60 ML/MIN/1.73 M^2
GLUCOSE SERPL-MCNC: 91 MG/DL (ref 70–110)
HCT VFR BLD AUTO: 50.7 % (ref 40–54)
HGB BLD-MCNC: 16 G/DL (ref 14–18)
IMM GRANULOCYTES # BLD AUTO: 0.06 K/UL (ref 0–0.04)
IMM GRANULOCYTES NFR BLD AUTO: 0.6 % (ref 0–0.5)
LYMPHOCYTES # BLD AUTO: 2.2 K/UL (ref 1–4.8)
LYMPHOCYTES NFR BLD: 23.7 % (ref 18–48)
MAGNESIUM SERPL-MCNC: 2.1 MG/DL (ref 1.6–2.6)
MCH RBC QN AUTO: 29.7 PG (ref 27–31)
MCHC RBC AUTO-ENTMCNC: 31.6 G/DL (ref 32–36)
MCV RBC AUTO: 94 FL (ref 82–98)
MONOCYTES # BLD AUTO: 0.7 K/UL (ref 0.3–1)
MONOCYTES NFR BLD: 7.9 % (ref 4–15)
NEUTROPHILS # BLD AUTO: 6.1 K/UL (ref 1.8–7.7)
NEUTROPHILS NFR BLD: 66 % (ref 38–73)
NRBC BLD-RTO: 0 /100 WBC
PLATELET # BLD AUTO: 227 K/UL (ref 150–450)
PMV BLD AUTO: 10.6 FL (ref 9.2–12.9)
POTASSIUM SERPL-SCNC: 4 MMOL/L (ref 3.5–5.1)
PROT SERPL-MCNC: 6.2 G/DL (ref 6–8.4)
RBC # BLD AUTO: 5.39 M/UL (ref 4.6–6.2)
SODIUM SERPL-SCNC: 141 MMOL/L (ref 136–145)
WBC # BLD AUTO: 9.26 K/UL (ref 3.9–12.7)

## 2021-06-15 PROCEDURE — 25000003 PHARM REV CODE 250: Performed by: PHYSICIAN ASSISTANT

## 2021-06-15 PROCEDURE — 94761 N-INVAS EAR/PLS OXIMETRY MLT: CPT

## 2021-06-15 PROCEDURE — 25000003 PHARM REV CODE 250: Performed by: INTERNAL MEDICINE

## 2021-06-15 PROCEDURE — 36415 COLL VENOUS BLD VENIPUNCTURE: CPT | Performed by: PHYSICIAN ASSISTANT

## 2021-06-15 PROCEDURE — 99232 SBSQ HOSP IP/OBS MODERATE 35: CPT | Mod: ,,, | Performed by: INTERNAL MEDICINE

## 2021-06-15 PROCEDURE — 85025 COMPLETE CBC W/AUTO DIFF WBC: CPT | Performed by: PHYSICIAN ASSISTANT

## 2021-06-15 PROCEDURE — 80053 COMPREHEN METABOLIC PANEL: CPT | Performed by: PHYSICIAN ASSISTANT

## 2021-06-15 PROCEDURE — 63600175 PHARM REV CODE 636 W HCPCS: Performed by: PHYSICIAN ASSISTANT

## 2021-06-15 PROCEDURE — 99232 PR SUBSEQUENT HOSPITAL CARE,LEVL II: ICD-10-PCS | Mod: ,,, | Performed by: INTERNAL MEDICINE

## 2021-06-15 PROCEDURE — 83735 ASSAY OF MAGNESIUM: CPT | Performed by: PHYSICIAN ASSISTANT

## 2021-06-15 RX ORDER — SACUBITRIL AND VALSARTAN 49; 51 MG/1; MG/1
1 TABLET, FILM COATED ORAL 2 TIMES DAILY
Qty: 60 TABLET | Refills: 2 | Status: SHIPPED | OUTPATIENT
Start: 2021-06-15 | End: 2021-10-09

## 2021-06-15 RX ADMIN — GUAIFENESIN AND DEXTROMETHORPHAN HYDROBROMIDE 1 TABLET: 600; 30 TABLET, EXTENDED RELEASE ORAL at 09:06

## 2021-06-15 RX ADMIN — OXYCODONE 5 MG: 5 TABLET ORAL at 03:06

## 2021-06-15 RX ADMIN — PREDNISONE 40 MG: 20 TABLET ORAL at 09:06

## 2021-06-15 RX ADMIN — AMIODARONE HYDROCHLORIDE 300 MG: 200 TABLET ORAL at 02:06

## 2021-06-15 RX ADMIN — METOPROLOL SUCCINATE 100 MG: 50 TABLET, EXTENDED RELEASE ORAL at 09:06

## 2021-06-15 RX ADMIN — SACUBITRIL AND VALSARTAN 1 TABLET: 49; 51 TABLET, FILM COATED ORAL at 09:06

## 2021-06-15 RX ADMIN — CLOPIDOGREL 75 MG: 75 TABLET, FILM COATED ORAL at 09:06

## 2021-06-15 RX ADMIN — ALLOPURINOL 100 MG: 100 TABLET ORAL at 09:06

## 2021-06-17 ENCOUNTER — PATIENT OUTREACH (OUTPATIENT)
Dept: ADMINISTRATIVE | Facility: CLINIC | Age: 71
End: 2021-06-17

## 2021-06-24 ENCOUNTER — PATIENT OUTREACH (OUTPATIENT)
Dept: EMERGENCY MEDICINE | Facility: HOSPITAL | Age: 71
End: 2021-06-24

## 2021-06-26 NOTE — ED NOTES
Patient identifiers verified and correct for Mr Oneil  C/C: Insect bite SEE NN  APPEARANCE: awake and alert in NAD.  SKIN: warm, dry no redness or swelling to right upper lip noted upon admit.   MUSCULOSKELETAL: Patient moving all extremities spontaneously, no obvious swelling or deformities noted. Ambulates independently.  RESPIRATORY: Denies shortness of breath.Respirations unlabored.   CARDIAC: Denies CP, 2+ distal pulses; no peripheral edema  ABDOMEN: S/ND/NT, Denies nausea  : voids spontaneously, denies difficulty  Neurologic: AAO x 4; follows commands equal strength in all extremities; denies numbness/tingling. Denies dizziness Denies weakness      used

## 2021-07-02 ENCOUNTER — OFFICE VISIT (OUTPATIENT)
Dept: TRANSPLANT | Facility: CLINIC | Age: 71
End: 2021-07-02
Attending: INTERNAL MEDICINE
Payer: MEDICARE

## 2021-07-02 VITALS
HEART RATE: 63 BPM | HEIGHT: 67 IN | WEIGHT: 237.88 LBS | DIASTOLIC BLOOD PRESSURE: 55 MMHG | SYSTOLIC BLOOD PRESSURE: 116 MMHG | BODY MASS INDEX: 37.34 KG/M2

## 2021-07-02 DIAGNOSIS — I25.10 CORONARY ARTERY DISEASE INVOLVING NATIVE CORONARY ARTERY OF NATIVE HEART WITHOUT ANGINA PECTORIS: Chronic | ICD-10-CM

## 2021-07-02 DIAGNOSIS — Z95.810 PRESENCE OF CARDIAC RESYNCHRONIZATION THERAPY DEFIBRILLATOR (CRT-D): ICD-10-CM

## 2021-07-02 DIAGNOSIS — E78.5 HYPERLIPIDEMIA LDL GOAL <70: Chronic | ICD-10-CM

## 2021-07-02 DIAGNOSIS — E66.01 SEVERE OBESITY (BMI 35.0-39.9) WITH COMORBIDITY: Chronic | ICD-10-CM

## 2021-07-02 DIAGNOSIS — I25.5 CARDIOMYOPATHY, ISCHEMIC: Chronic | ICD-10-CM

## 2021-07-02 DIAGNOSIS — R06.02 SOB (SHORTNESS OF BREATH): ICD-10-CM

## 2021-07-02 DIAGNOSIS — I13.0 HYPERTENSIVE CARDIOVASCULAR-RENAL DISEASE, STAGE 1-4 OR UNSPECIFIED CHRONIC KIDNEY DISEASE, WITH HEART FAILURE: ICD-10-CM

## 2021-07-02 DIAGNOSIS — I50.42 CHRONIC COMBINED SYSTOLIC AND DIASTOLIC CONGESTIVE HEART FAILURE: Primary | ICD-10-CM

## 2021-07-02 DIAGNOSIS — I70.0 THORACIC AORTIC ATHEROSCLEROSIS: ICD-10-CM

## 2021-07-02 PROCEDURE — 99499 RISK ADDL DX/OHS AUDIT: ICD-10-PCS | Mod: S$GLB,,, | Performed by: INTERNAL MEDICINE

## 2021-07-02 PROCEDURE — 99214 OFFICE O/P EST MOD 30 MIN: CPT | Mod: S$GLB,,, | Performed by: INTERNAL MEDICINE

## 2021-07-02 PROCEDURE — 1111F DSCHRG MED/CURRENT MED MERGE: CPT | Mod: CPTII,S$GLB,, | Performed by: INTERNAL MEDICINE

## 2021-07-02 PROCEDURE — 99999 PR PBB SHADOW E&M-EST. PATIENT-LVL IV: CPT | Mod: PBBFAC,,, | Performed by: INTERNAL MEDICINE

## 2021-07-02 PROCEDURE — 1159F MED LIST DOCD IN RCRD: CPT | Mod: S$GLB,,, | Performed by: INTERNAL MEDICINE

## 2021-07-02 PROCEDURE — 1159F PR MEDICATION LIST DOCUMENTED IN MEDICAL RECORD: ICD-10-PCS | Mod: S$GLB,,, | Performed by: INTERNAL MEDICINE

## 2021-07-02 PROCEDURE — 1101F PT FALLS ASSESS-DOCD LE1/YR: CPT | Mod: CPTII,S$GLB,, | Performed by: INTERNAL MEDICINE

## 2021-07-02 PROCEDURE — 1111F PR DISCHARGE MEDS RECONCILED W/ CURRENT OUTPATIENT MED LIST: ICD-10-PCS | Mod: CPTII,S$GLB,, | Performed by: INTERNAL MEDICINE

## 2021-07-02 PROCEDURE — 1125F PR PAIN SEVERITY QUANTIFIED, PAIN PRESENT: ICD-10-PCS | Mod: S$GLB,,, | Performed by: INTERNAL MEDICINE

## 2021-07-02 PROCEDURE — 3288F FALL RISK ASSESSMENT DOCD: CPT | Mod: CPTII,S$GLB,, | Performed by: INTERNAL MEDICINE

## 2021-07-02 PROCEDURE — 1101F PR PT FALLS ASSESS DOC 0-1 FALLS W/OUT INJ PAST YR: ICD-10-PCS | Mod: CPTII,S$GLB,, | Performed by: INTERNAL MEDICINE

## 2021-07-02 PROCEDURE — 99999 PR PBB SHADOW E&M-EST. PATIENT-LVL IV: ICD-10-PCS | Mod: PBBFAC,,, | Performed by: INTERNAL MEDICINE

## 2021-07-02 PROCEDURE — 99214 PR OFFICE/OUTPT VISIT, EST, LEVL IV, 30-39 MIN: ICD-10-PCS | Mod: S$GLB,,, | Performed by: INTERNAL MEDICINE

## 2021-07-02 PROCEDURE — 3288F PR FALLS RISK ASSESSMENT DOCUMENTED: ICD-10-PCS | Mod: CPTII,S$GLB,, | Performed by: INTERNAL MEDICINE

## 2021-07-02 PROCEDURE — 3008F PR BODY MASS INDEX (BMI) DOCUMENTED: ICD-10-PCS | Mod: CPTII,S$GLB,, | Performed by: INTERNAL MEDICINE

## 2021-07-02 PROCEDURE — 99499 UNLISTED E&M SERVICE: CPT | Mod: S$GLB,,, | Performed by: INTERNAL MEDICINE

## 2021-07-02 PROCEDURE — 3008F BODY MASS INDEX DOCD: CPT | Mod: CPTII,S$GLB,, | Performed by: INTERNAL MEDICINE

## 2021-07-02 PROCEDURE — 1125F AMNT PAIN NOTED PAIN PRSNT: CPT | Mod: S$GLB,,, | Performed by: INTERNAL MEDICINE

## 2021-07-02 RX ORDER — ATORVASTATIN CALCIUM 80 MG/1
80 TABLET, FILM COATED ORAL NIGHTLY
Qty: 90 TABLET | Refills: 3 | Status: SHIPPED | OUTPATIENT
Start: 2021-07-02 | End: 2021-01-01 | Stop reason: SDUPTHER

## 2021-07-09 ENCOUNTER — LAB VISIT (OUTPATIENT)
Dept: LAB | Facility: HOSPITAL | Age: 71
End: 2021-07-09
Attending: INTERNAL MEDICINE
Payer: MEDICARE

## 2021-07-09 DIAGNOSIS — I50.42 CHRONIC COMBINED SYSTOLIC AND DIASTOLIC CONGESTIVE HEART FAILURE: ICD-10-CM

## 2021-07-09 DIAGNOSIS — R06.02 SOB (SHORTNESS OF BREATH): ICD-10-CM

## 2021-07-09 DIAGNOSIS — I13.0 HYPERTENSIVE CARDIOVASCULAR-RENAL DISEASE, STAGE 1-4 OR UNSPECIFIED CHRONIC KIDNEY DISEASE, WITH HEART FAILURE: ICD-10-CM

## 2021-07-09 LAB
ANION GAP SERPL CALC-SCNC: 10 MMOL/L (ref 8–16)
BNP SERPL-MCNC: 122 PG/ML (ref 0–99)
BUN SERPL-MCNC: 27 MG/DL (ref 8–23)
CALCIUM SERPL-MCNC: 9.2 MG/DL (ref 8.7–10.5)
CHLORIDE SERPL-SCNC: 108 MMOL/L (ref 95–110)
CO2 SERPL-SCNC: 26 MMOL/L (ref 23–29)
CREAT SERPL-MCNC: 1.3 MG/DL (ref 0.5–1.4)
EST. GFR  (AFRICAN AMERICAN): >60 ML/MIN/1.73 M^2
EST. GFR  (NON AFRICAN AMERICAN): 55.7 ML/MIN/1.73 M^2
GLUCOSE SERPL-MCNC: 123 MG/DL (ref 70–110)
POTASSIUM SERPL-SCNC: 4.2 MMOL/L (ref 3.5–5.1)
SODIUM SERPL-SCNC: 144 MMOL/L (ref 136–145)

## 2021-07-09 PROCEDURE — 36415 COLL VENOUS BLD VENIPUNCTURE: CPT | Performed by: INTERNAL MEDICINE

## 2021-07-09 PROCEDURE — 80048 BASIC METABOLIC PNL TOTAL CA: CPT | Performed by: INTERNAL MEDICINE

## 2021-07-09 PROCEDURE — 83880 ASSAY OF NATRIURETIC PEPTIDE: CPT | Performed by: INTERNAL MEDICINE

## 2021-07-14 DIAGNOSIS — I50.22 CHRONIC SYSTOLIC CONGESTIVE HEART FAILURE: Primary | ICD-10-CM

## 2021-07-26 ENCOUNTER — PATIENT OUTREACH (OUTPATIENT)
Dept: EMERGENCY MEDICINE | Facility: HOSPITAL | Age: 71
End: 2021-07-26

## 2021-07-26 ENCOUNTER — HOSPITAL ENCOUNTER (EMERGENCY)
Facility: HOSPITAL | Age: 71
Discharge: HOME | End: 2021-07-26
Attending: EMERGENCY MEDICINE
Payer: MEDICARE

## 2021-07-26 VITALS
HEART RATE: 68 BPM | OXYGEN SATURATION: 96 % | BODY MASS INDEX: 37.67 KG/M2 | HEIGHT: 67 IN | RESPIRATION RATE: 18 BRPM | SYSTOLIC BLOOD PRESSURE: 106 MMHG | DIASTOLIC BLOOD PRESSURE: 66 MMHG | TEMPERATURE: 98 F | WEIGHT: 240 LBS

## 2021-07-26 DIAGNOSIS — I50.9 CONGESTIVE HEART FAILURE, UNSPECIFIED HF CHRONICITY, UNSPECIFIED HEART FAILURE TYPE: ICD-10-CM

## 2021-07-26 DIAGNOSIS — R42 DIZZINESS: Primary | ICD-10-CM

## 2021-07-26 LAB
ALBUMIN SERPL BCP-MCNC: 4.2 G/DL (ref 3.5–5.2)
ALP SERPL-CCNC: 73 U/L (ref 55–135)
ALT SERPL W/O P-5'-P-CCNC: 44 U/L (ref 10–44)
ANION GAP SERPL CALC-SCNC: 12 MMOL/L (ref 8–16)
AST SERPL-CCNC: 43 U/L (ref 10–40)
BASOPHILS # BLD AUTO: 0.1 K/UL (ref 0–0.2)
BASOPHILS NFR BLD: 1.2 % (ref 0–1.9)
BILIRUB SERPL-MCNC: 0.6 MG/DL (ref 0.1–1)
BNP SERPL-MCNC: 185 PG/ML (ref 0–99)
BUN SERPL-MCNC: 22 MG/DL (ref 8–23)
CALCIUM SERPL-MCNC: 9.3 MG/DL (ref 8.7–10.5)
CHLORIDE SERPL-SCNC: 107 MMOL/L (ref 95–110)
CO2 SERPL-SCNC: 24 MMOL/L (ref 23–29)
CREAT SERPL-MCNC: 1.3 MG/DL (ref 0.5–1.4)
DIFFERENTIAL METHOD: ABNORMAL
EOSINOPHIL # BLD AUTO: 0.2 K/UL (ref 0–0.5)
EOSINOPHIL NFR BLD: 2.1 % (ref 0–8)
ERYTHROCYTE [DISTWIDTH] IN BLOOD BY AUTOMATED COUNT: 14.2 % (ref 11.5–14.5)
EST. GFR  (AFRICAN AMERICAN): >60 ML/MIN/1.73 M^2
EST. GFR  (NON AFRICAN AMERICAN): 55.7 ML/MIN/1.73 M^2
GLUCOSE SERPL-MCNC: 100 MG/DL (ref 70–110)
HCT VFR BLD AUTO: 51.3 % (ref 40–54)
HGB BLD-MCNC: 16.8 G/DL (ref 14–18)
IMM GRANULOCYTES # BLD AUTO: 0.04 K/UL (ref 0–0.04)
IMM GRANULOCYTES NFR BLD AUTO: 0.5 % (ref 0–0.5)
LYMPHOCYTES # BLD AUTO: 2.8 K/UL (ref 1–4.8)
LYMPHOCYTES NFR BLD: 33.3 % (ref 18–48)
MCH RBC QN AUTO: 31.3 PG (ref 27–31)
MCHC RBC AUTO-ENTMCNC: 32.7 G/DL (ref 32–36)
MCV RBC AUTO: 96 FL (ref 82–98)
MONOCYTES # BLD AUTO: 0.8 K/UL (ref 0.3–1)
MONOCYTES NFR BLD: 8.9 % (ref 4–15)
NEUTROPHILS # BLD AUTO: 4.6 K/UL (ref 1.8–7.7)
NEUTROPHILS NFR BLD: 54 % (ref 38–73)
NRBC BLD-RTO: 0 /100 WBC
PLATELET # BLD AUTO: 245 K/UL (ref 150–450)
PMV BLD AUTO: 10.5 FL (ref 9.2–12.9)
POTASSIUM SERPL-SCNC: 4.4 MMOL/L (ref 3.5–5.1)
PROT SERPL-MCNC: 7.8 G/DL (ref 6–8.4)
RBC # BLD AUTO: 5.37 M/UL (ref 4.6–6.2)
SODIUM SERPL-SCNC: 143 MMOL/L (ref 136–145)
TROPONIN I SERPL DL<=0.01 NG/ML-MCNC: 0.03 NG/ML (ref 0–0.03)
WBC # BLD AUTO: 8.45 K/UL (ref 3.9–12.7)

## 2021-07-26 PROCEDURE — 99285 PR EMERGENCY DEPT VISIT,LEVEL V: ICD-10-PCS | Mod: ,,, | Performed by: PHYSICIAN ASSISTANT

## 2021-07-26 PROCEDURE — 93010 ELECTROCARDIOGRAM REPORT: CPT | Mod: ,,, | Performed by: INTERNAL MEDICINE

## 2021-07-26 PROCEDURE — 93010 EKG 12-LEAD: ICD-10-PCS | Mod: ,,, | Performed by: INTERNAL MEDICINE

## 2021-07-26 PROCEDURE — 93005 ELECTROCARDIOGRAM TRACING: CPT

## 2021-07-26 PROCEDURE — 84484 ASSAY OF TROPONIN QUANT: CPT | Performed by: EMERGENCY MEDICINE

## 2021-07-26 PROCEDURE — 99285 EMERGENCY DEPT VISIT HI MDM: CPT | Mod: ,,, | Performed by: PHYSICIAN ASSISTANT

## 2021-07-26 PROCEDURE — 85025 COMPLETE CBC W/AUTO DIFF WBC: CPT | Performed by: EMERGENCY MEDICINE

## 2021-07-26 PROCEDURE — 80053 COMPREHEN METABOLIC PANEL: CPT | Performed by: EMERGENCY MEDICINE

## 2021-07-26 PROCEDURE — 83880 ASSAY OF NATRIURETIC PEPTIDE: CPT | Performed by: EMERGENCY MEDICINE

## 2021-07-26 PROCEDURE — 99285 EMERGENCY DEPT VISIT HI MDM: CPT | Mod: 25

## 2021-07-27 ENCOUNTER — RESEARCH ENCOUNTER (OUTPATIENT)
Dept: RESEARCH | Facility: HOSPITAL | Age: 71
End: 2021-07-27

## 2021-08-12 ENCOUNTER — CLINICAL SUPPORT (OUTPATIENT)
Dept: CARDIOLOGY | Facility: HOSPITAL | Age: 71
End: 2021-08-12
Payer: MEDICARE

## 2021-08-12 DIAGNOSIS — I25.5 ISCHEMIC CARDIOMYOPATHY: ICD-10-CM

## 2021-08-12 DIAGNOSIS — Z95.810 PRESENCE OF AUTOMATIC (IMPLANTABLE) CARDIAC DEFIBRILLATOR: ICD-10-CM

## 2021-08-12 DIAGNOSIS — I47.20 VENTRICULAR TACHYCARDIA: ICD-10-CM

## 2021-08-12 PROCEDURE — 93296 REM INTERROG EVL PM/IDS: CPT | Performed by: INTERNAL MEDICINE

## 2021-08-12 PROCEDURE — 93295 DEV INTERROG REMOTE 1/2/MLT: CPT | Mod: ,,, | Performed by: INTERNAL MEDICINE

## 2021-08-12 PROCEDURE — 93295 CARDIAC DEVICE CHECK - REMOTE: ICD-10-PCS | Mod: ,,, | Performed by: INTERNAL MEDICINE

## 2021-08-16 ENCOUNTER — HOSPITAL ENCOUNTER (INPATIENT)
Facility: HOSPITAL | Age: 71
LOS: 4 days | Discharge: HOME | DRG: 177 | End: 2021-08-21
Attending: OTOLARYNGOLOGY | Admitting: INTERNAL MEDICINE
Payer: MEDICARE

## 2021-08-16 DIAGNOSIS — I25.5 CARDIOMYOPATHY, ISCHEMIC: Chronic | ICD-10-CM

## 2021-08-16 DIAGNOSIS — N17.9 AKI (ACUTE KIDNEY INJURY): ICD-10-CM

## 2021-08-16 DIAGNOSIS — R42 DIZZINESS: ICD-10-CM

## 2021-08-16 DIAGNOSIS — I10 ESSENTIAL HYPERTENSION: Chronic | ICD-10-CM

## 2021-08-16 DIAGNOSIS — U07.1 PNEUMONIA DUE TO COVID-19 VIRUS: Primary | ICD-10-CM

## 2021-08-16 DIAGNOSIS — R06.02 SHORTNESS OF BREATH: ICD-10-CM

## 2021-08-16 DIAGNOSIS — R79.89 TROPONIN LEVEL ELEVATED: ICD-10-CM

## 2021-08-16 DIAGNOSIS — Z86.711 HX PULMONARY EMBOLISM: Chronic | ICD-10-CM

## 2021-08-16 DIAGNOSIS — G56.01 RIGHT CARPAL TUNNEL SYNDROME: ICD-10-CM

## 2021-08-16 DIAGNOSIS — I70.0 THORACIC AORTIC ATHEROSCLEROSIS: ICD-10-CM

## 2021-08-16 DIAGNOSIS — R49.0 DYSPHONIA: ICD-10-CM

## 2021-08-16 DIAGNOSIS — R29.898 DECREASED GRIP STRENGTH OF LEFT HAND: ICD-10-CM

## 2021-08-16 DIAGNOSIS — I50.42 CHRONIC COMBINED SYSTOLIC AND DIASTOLIC CONGESTIVE HEART FAILURE: ICD-10-CM

## 2021-08-16 DIAGNOSIS — M25.421 SWELLING OF RIGHT ELBOW: ICD-10-CM

## 2021-08-16 DIAGNOSIS — U07.1 COVID-19: ICD-10-CM

## 2021-08-16 DIAGNOSIS — E78.5 HYPERLIPIDEMIA LDL GOAL <70: Chronic | ICD-10-CM

## 2021-08-16 DIAGNOSIS — J12.82 PNEUMONIA DUE TO COVID-19 VIRUS: Primary | ICD-10-CM

## 2021-08-16 DIAGNOSIS — Z86.79 H/O VENTRICULAR TACHYCARDIA: Chronic | ICD-10-CM

## 2021-08-16 DIAGNOSIS — J98.11 ATELECTASIS: ICD-10-CM

## 2021-08-16 DIAGNOSIS — R29.898 FINE MOTOR IMPAIRMENT: ICD-10-CM

## 2021-08-16 DIAGNOSIS — I44.7 LBBB (LEFT BUNDLE BRANCH BLOCK): ICD-10-CM

## 2021-08-16 DIAGNOSIS — R73.03 PREDIABETES: ICD-10-CM

## 2021-08-16 DIAGNOSIS — R22.31 LOCALIZED SWELLING OF RIGHT THUMB: ICD-10-CM

## 2021-08-16 DIAGNOSIS — E55.9 VITAMIN D DEFICIENCY: ICD-10-CM

## 2021-08-16 DIAGNOSIS — M25.642 DECREASED RANGE OF MOTION OF FINGER OF LEFT HAND: ICD-10-CM

## 2021-08-16 DIAGNOSIS — Z86.718 HISTORY OF DVT (DEEP VEIN THROMBOSIS): Chronic | ICD-10-CM

## 2021-08-16 DIAGNOSIS — M54.2 NECK PAIN, BILATERAL POSTERIOR: ICD-10-CM

## 2021-08-16 DIAGNOSIS — R09.02 HYPOXIA: ICD-10-CM

## 2021-08-16 DIAGNOSIS — I13.0 HYPERTENSIVE CARDIOVASCULAR-RENAL DISEASE, STAGE 1-4 OR UNSPECIFIED CHRONIC KIDNEY DISEASE, WITH HEART FAILURE: ICD-10-CM

## 2021-08-16 DIAGNOSIS — R29.818 FINE MOTOR IMPAIRMENT: ICD-10-CM

## 2021-08-16 DIAGNOSIS — M65.311 TRIGGER THUMB OF RIGHT HAND: ICD-10-CM

## 2021-08-16 DIAGNOSIS — R79.89 ELEVATED TROPONIN: Chronic | ICD-10-CM

## 2021-08-16 DIAGNOSIS — M1A.0420 IDIOPATHIC CHRONIC GOUT OF LEFT HAND WITHOUT TOPHUS: Chronic | ICD-10-CM

## 2021-08-16 DIAGNOSIS — J96.01 ACUTE HYPOXEMIC RESPIRATORY FAILURE: ICD-10-CM

## 2021-08-16 DIAGNOSIS — E66.01 SEVERE OBESITY (BMI 35.0-39.9) WITH COMORBIDITY: Chronic | ICD-10-CM

## 2021-08-16 DIAGNOSIS — J06.9 UPPER RESPIRATORY TRACT INFECTION, UNSPECIFIED TYPE: ICD-10-CM

## 2021-08-16 DIAGNOSIS — I50.43 ACUTE ON CHRONIC COMBINED SYSTOLIC AND DIASTOLIC CONGESTIVE HEART FAILURE: ICD-10-CM

## 2021-08-16 DIAGNOSIS — I25.10 CORONARY ARTERY DISEASE INVOLVING NATIVE CORONARY ARTERY OF NATIVE HEART WITHOUT ANGINA PECTORIS: Chronic | ICD-10-CM

## 2021-08-16 DIAGNOSIS — Z95.810 PRESENCE OF CARDIAC RESYNCHRONIZATION THERAPY DEFIBRILLATOR (CRT-D): ICD-10-CM

## 2021-08-16 LAB
BASOPHILS # BLD AUTO: 0.02 K/UL (ref 0–0.2)
BASOPHILS NFR BLD: 0.4 % (ref 0–1.9)
BNP SERPL-MCNC: 208 PG/ML (ref 0–99)
CTP QC/QA: YES
D DIMER PPP IA.FEU-MCNC: 0.77 MG/L FEU
DIFFERENTIAL METHOD: ABNORMAL
EOSINOPHIL # BLD AUTO: 0 K/UL (ref 0–0.5)
EOSINOPHIL NFR BLD: 0 % (ref 0–8)
ERYTHROCYTE [DISTWIDTH] IN BLOOD BY AUTOMATED COUNT: 14.2 % (ref 11.5–14.5)
FERRITIN SERPL-MCNC: 137 NG/ML (ref 20–300)
HCT VFR BLD AUTO: 48.6 % (ref 40–54)
HGB BLD-MCNC: 16.3 G/DL (ref 14–18)
IMM GRANULOCYTES # BLD AUTO: 0.03 K/UL (ref 0–0.04)
IMM GRANULOCYTES NFR BLD AUTO: 0.5 % (ref 0–0.5)
LACTATE SERPL-SCNC: 1.3 MMOL/L (ref 0.5–2.2)
LDH SERPL L TO P-CCNC: 281 U/L (ref 110–260)
LYMPHOCYTES # BLD AUTO: 1.1 K/UL (ref 1–4.8)
LYMPHOCYTES NFR BLD: 18.8 % (ref 18–48)
MCH RBC QN AUTO: 31.3 PG (ref 27–31)
MCHC RBC AUTO-ENTMCNC: 33.5 G/DL (ref 32–36)
MCV RBC AUTO: 93 FL (ref 82–98)
MONOCYTES # BLD AUTO: 0.4 K/UL (ref 0.3–1)
MONOCYTES NFR BLD: 7.3 % (ref 4–15)
NEUTROPHILS # BLD AUTO: 4.1 K/UL (ref 1.8–7.7)
NEUTROPHILS NFR BLD: 73 % (ref 38–73)
NRBC BLD-RTO: 0 /100 WBC
PLATELET # BLD AUTO: 161 K/UL (ref 150–450)
PMV BLD AUTO: 11.2 FL (ref 9.2–12.9)
RBC # BLD AUTO: 5.21 M/UL (ref 4.6–6.2)
SARS-COV-2 RDRP RESP QL NAA+PROBE: POSITIVE
TROPONIN I SERPL DL<=0.01 NG/ML-MCNC: 0.12 NG/ML (ref 0–0.03)
WBC # BLD AUTO: 5.64 K/UL (ref 3.9–12.7)

## 2021-08-16 PROCEDURE — U0002 COVID-19 LAB TEST NON-CDC: HCPCS | Performed by: EMERGENCY MEDICINE

## 2021-08-16 PROCEDURE — 99285 PR EMERGENCY DEPT VISIT,LEVEL V: ICD-10-PCS | Mod: CR,CS,, | Performed by: PHYSICIAN ASSISTANT

## 2021-08-16 PROCEDURE — 83880 ASSAY OF NATRIURETIC PEPTIDE: CPT | Performed by: PHYSICIAN ASSISTANT

## 2021-08-16 PROCEDURE — 93010 ELECTROCARDIOGRAM REPORT: CPT | Mod: ,,, | Performed by: INTERNAL MEDICINE

## 2021-08-16 PROCEDURE — 83735 ASSAY OF MAGNESIUM: CPT | Performed by: PHYSICIAN ASSISTANT

## 2021-08-16 PROCEDURE — 83605 ASSAY OF LACTIC ACID: CPT | Performed by: PHYSICIAN ASSISTANT

## 2021-08-16 PROCEDURE — 25000003 PHARM REV CODE 250: Performed by: PHYSICIAN ASSISTANT

## 2021-08-16 PROCEDURE — 99285 EMERGENCY DEPT VISIT HI MDM: CPT | Mod: CR,CS,, | Performed by: PHYSICIAN ASSISTANT

## 2021-08-16 PROCEDURE — 85379 FIBRIN DEGRADATION QUANT: CPT | Performed by: PHYSICIAN ASSISTANT

## 2021-08-16 PROCEDURE — 86140 C-REACTIVE PROTEIN: CPT | Performed by: PHYSICIAN ASSISTANT

## 2021-08-16 PROCEDURE — 83615 LACTATE (LD) (LDH) ENZYME: CPT | Performed by: PHYSICIAN ASSISTANT

## 2021-08-16 PROCEDURE — 85025 COMPLETE CBC W/AUTO DIFF WBC: CPT | Performed by: PHYSICIAN ASSISTANT

## 2021-08-16 PROCEDURE — 93010 EKG 12-LEAD: ICD-10-PCS | Mod: ,,, | Performed by: INTERNAL MEDICINE

## 2021-08-16 PROCEDURE — 93005 ELECTROCARDIOGRAM TRACING: CPT | Performed by: INTERNAL MEDICINE

## 2021-08-16 PROCEDURE — 99285 EMERGENCY DEPT VISIT HI MDM: CPT

## 2021-08-16 PROCEDURE — 80053 COMPREHEN METABOLIC PANEL: CPT | Performed by: PHYSICIAN ASSISTANT

## 2021-08-16 PROCEDURE — 93005 ELECTROCARDIOGRAM TRACING: CPT

## 2021-08-16 PROCEDURE — 82728 ASSAY OF FERRITIN: CPT | Performed by: PHYSICIAN ASSISTANT

## 2021-08-16 PROCEDURE — 84484 ASSAY OF TROPONIN QUANT: CPT | Performed by: PHYSICIAN ASSISTANT

## 2021-08-16 PROCEDURE — 82550 ASSAY OF CK (CPK): CPT | Performed by: PHYSICIAN ASSISTANT

## 2021-08-16 RX ORDER — PROMETHAZINE HYDROCHLORIDE 12.5 MG/1
12.5 TABLET ORAL
Status: COMPLETED | OUTPATIENT
Start: 2021-08-16 | End: 2021-08-16

## 2021-08-16 RX ADMIN — PROMETHAZINE HYDROCHLORIDE 12.5 MG: 12.5 TABLET ORAL at 11:08

## 2021-08-17 PROBLEM — U07.1 COVID-19: Status: ACTIVE | Noted: 2021-08-17

## 2021-08-17 LAB
25(OH)D3+25(OH)D2 SERPL-MCNC: 27 NG/ML (ref 30–96)
ALBUMIN SERPL BCP-MCNC: 3.6 G/DL (ref 3.5–5.2)
ALBUMIN SERPL BCP-MCNC: 3.9 G/DL (ref 3.5–5.2)
ALP SERPL-CCNC: 53 U/L (ref 55–135)
ALP SERPL-CCNC: 57 U/L (ref 55–135)
ALT SERPL W/O P-5'-P-CCNC: 51 U/L (ref 10–44)
ALT SERPL W/O P-5'-P-CCNC: 56 U/L (ref 10–44)
ANION GAP SERPL CALC-SCNC: 15 MMOL/L (ref 8–16)
ANION GAP SERPL CALC-SCNC: 15 MMOL/L (ref 8–16)
APTT BLDCRRT: 35.2 SEC (ref 21–32)
AST SERPL-CCNC: 56 U/L (ref 10–40)
AST SERPL-CCNC: 74 U/L (ref 10–40)
BASOPHILS # BLD AUTO: 0.01 K/UL (ref 0–0.2)
BASOPHILS NFR BLD: 0.2 % (ref 0–1.9)
BILIRUB SERPL-MCNC: 0.5 MG/DL (ref 0.1–1)
BILIRUB SERPL-MCNC: 0.7 MG/DL (ref 0.1–1)
BUN SERPL-MCNC: 25 MG/DL (ref 8–23)
BUN SERPL-MCNC: 28 MG/DL (ref 8–23)
CALCIUM SERPL-MCNC: 8.4 MG/DL (ref 8.7–10.5)
CALCIUM SERPL-MCNC: 8.4 MG/DL (ref 8.7–10.5)
CHLORIDE SERPL-SCNC: 104 MMOL/L (ref 95–110)
CHLORIDE SERPL-SCNC: 105 MMOL/L (ref 95–110)
CK SERPL-CCNC: 69 U/L (ref 20–200)
CO2 SERPL-SCNC: 17 MMOL/L (ref 23–29)
CO2 SERPL-SCNC: 21 MMOL/L (ref 23–29)
CREAT SERPL-MCNC: 1.6 MG/DL (ref 0.5–1.4)
CREAT SERPL-MCNC: 1.9 MG/DL (ref 0.5–1.4)
CRP SERPL-MCNC: 13.6 MG/L (ref 0–8.2)
DIFFERENTIAL METHOD: ABNORMAL
EOSINOPHIL # BLD AUTO: 0 K/UL (ref 0–0.5)
EOSINOPHIL NFR BLD: 0 % (ref 0–8)
ERYTHROCYTE [DISTWIDTH] IN BLOOD BY AUTOMATED COUNT: 14.2 % (ref 11.5–14.5)
ERYTHROCYTE [SEDIMENTATION RATE] IN BLOOD BY WESTERGREN METHOD: 24 MM/HR (ref 0–23)
EST. GFR  (AFRICAN AMERICAN): 40.7 ML/MIN/1.73 M^2
EST. GFR  (AFRICAN AMERICAN): 50.1 ML/MIN/1.73 M^2
EST. GFR  (NON AFRICAN AMERICAN): 35.2 ML/MIN/1.73 M^2
EST. GFR  (NON AFRICAN AMERICAN): 43.3 ML/MIN/1.73 M^2
GLUCOSE SERPL-MCNC: 127 MG/DL (ref 70–110)
GLUCOSE SERPL-MCNC: 148 MG/DL (ref 70–110)
HCT VFR BLD AUTO: 46.6 % (ref 40–54)
HGB BLD-MCNC: 15.1 G/DL (ref 14–18)
IMM GRANULOCYTES # BLD AUTO: 0.02 K/UL (ref 0–0.04)
IMM GRANULOCYTES NFR BLD AUTO: 0.5 % (ref 0–0.5)
INR PPP: 1 (ref 0.8–1.2)
LYMPHOCYTES # BLD AUTO: 1 K/UL (ref 1–4.8)
LYMPHOCYTES NFR BLD: 23.3 % (ref 18–48)
MAGNESIUM SERPL-MCNC: 1.9 MG/DL (ref 1.6–2.6)
MAGNESIUM SERPL-MCNC: 2.1 MG/DL (ref 1.6–2.6)
MCH RBC QN AUTO: 30.4 PG (ref 27–31)
MCHC RBC AUTO-ENTMCNC: 32.4 G/DL (ref 32–36)
MCV RBC AUTO: 94 FL (ref 82–98)
MONOCYTES # BLD AUTO: 0.2 K/UL (ref 0.3–1)
MONOCYTES NFR BLD: 4 % (ref 4–15)
NEUTROPHILS # BLD AUTO: 3 K/UL (ref 1.8–7.7)
NEUTROPHILS NFR BLD: 72 % (ref 38–73)
NRBC BLD-RTO: 0 /100 WBC
PHOSPHATE SERPL-MCNC: 4.4 MG/DL (ref 2.7–4.5)
PLATELET # BLD AUTO: 137 K/UL (ref 150–450)
PMV BLD AUTO: 11.8 FL (ref 9.2–12.9)
POTASSIUM SERPL-SCNC: 3.4 MMOL/L (ref 3.5–5.1)
POTASSIUM SERPL-SCNC: 4 MMOL/L (ref 3.5–5.1)
PROCALCITONIN SERPL IA-MCNC: 0.11 NG/ML
PROT SERPL-MCNC: 6.7 G/DL (ref 6–8.4)
PROT SERPL-MCNC: 7.5 G/DL (ref 6–8.4)
PROTHROMBIN TIME: 10.8 SEC (ref 9–12.5)
RBC # BLD AUTO: 4.97 M/UL (ref 4.6–6.2)
SODIUM SERPL-SCNC: 137 MMOL/L (ref 136–145)
SODIUM SERPL-SCNC: 140 MMOL/L (ref 136–145)
WBC # BLD AUTO: 4.21 K/UL (ref 3.9–12.7)

## 2021-08-17 PROCEDURE — 82306 VITAMIN D 25 HYDROXY: CPT | Performed by: PHYSICIAN ASSISTANT

## 2021-08-17 PROCEDURE — 36415 COLL VENOUS BLD VENIPUNCTURE: CPT | Performed by: PHYSICIAN ASSISTANT

## 2021-08-17 PROCEDURE — 63600175 PHARM REV CODE 636 W HCPCS: Performed by: PHYSICIAN ASSISTANT

## 2021-08-17 PROCEDURE — 85652 RBC SED RATE AUTOMATED: CPT | Performed by: PHYSICIAN ASSISTANT

## 2021-08-17 PROCEDURE — 27000207 HC ISOLATION

## 2021-08-17 PROCEDURE — 80053 COMPREHEN METABOLIC PANEL: CPT | Performed by: PHYSICIAN ASSISTANT

## 2021-08-17 PROCEDURE — 84100 ASSAY OF PHOSPHORUS: CPT | Performed by: PHYSICIAN ASSISTANT

## 2021-08-17 PROCEDURE — 99223 1ST HOSP IP/OBS HIGH 75: CPT | Mod: CR,AI,, | Performed by: INTERNAL MEDICINE

## 2021-08-17 PROCEDURE — 85025 COMPLETE CBC W/AUTO DIFF WBC: CPT | Performed by: PHYSICIAN ASSISTANT

## 2021-08-17 PROCEDURE — 85730 THROMBOPLASTIN TIME PARTIAL: CPT | Performed by: PHYSICIAN ASSISTANT

## 2021-08-17 PROCEDURE — 99223 PR INITIAL HOSPITAL CARE,LEVL III: ICD-10-PCS | Mod: CR,AI,, | Performed by: INTERNAL MEDICINE

## 2021-08-17 PROCEDURE — 84145 PROCALCITONIN (PCT): CPT | Performed by: PHYSICIAN ASSISTANT

## 2021-08-17 PROCEDURE — 20600001 HC STEP DOWN PRIVATE ROOM

## 2021-08-17 PROCEDURE — 83735 ASSAY OF MAGNESIUM: CPT | Performed by: PHYSICIAN ASSISTANT

## 2021-08-17 PROCEDURE — 25000003 PHARM REV CODE 250: Performed by: OTOLARYNGOLOGY

## 2021-08-17 PROCEDURE — 85610 PROTHROMBIN TIME: CPT | Performed by: PHYSICIAN ASSISTANT

## 2021-08-17 PROCEDURE — 25000003 PHARM REV CODE 250: Performed by: PHYSICIAN ASSISTANT

## 2021-08-17 RX ORDER — ACETAMINOPHEN 325 MG/1
650 TABLET ORAL EVERY 4 HOURS PRN
Status: DISCONTINUED | OUTPATIENT
Start: 2021-08-17 | End: 2021-08-21 | Stop reason: HOSPADM

## 2021-08-17 RX ORDER — BENZONATATE 100 MG/1
100 CAPSULE ORAL 3 TIMES DAILY PRN
Status: DISCONTINUED | OUTPATIENT
Start: 2021-08-17 | End: 2021-08-21 | Stop reason: HOSPADM

## 2021-08-17 RX ORDER — SODIUM CHLORIDE 0.9 % (FLUSH) 0.9 %
10 SYRINGE (ML) INJECTION
Status: DISCONTINUED | OUTPATIENT
Start: 2021-08-17 | End: 2021-08-21 | Stop reason: HOSPADM

## 2021-08-17 RX ORDER — POTASSIUM CHLORIDE 20 MEQ/1
20 TABLET, EXTENDED RELEASE ORAL ONCE
Status: COMPLETED | OUTPATIENT
Start: 2021-08-17 | End: 2021-08-17

## 2021-08-17 RX ORDER — GLUCAGON 1 MG
1 KIT INJECTION
Status: DISCONTINUED | OUTPATIENT
Start: 2021-08-17 | End: 2021-08-21 | Stop reason: HOSPADM

## 2021-08-17 RX ORDER — ALBUTEROL SULFATE 90 UG/1
2 AEROSOL, METERED RESPIRATORY (INHALATION) EVERY 6 HOURS PRN
Status: DISCONTINUED | OUTPATIENT
Start: 2021-08-17 | End: 2021-08-21 | Stop reason: HOSPADM

## 2021-08-17 RX ORDER — ASPIRIN 325 MG
325 TABLET, DELAYED RELEASE (ENTERIC COATED) ORAL NIGHTLY
Status: DISCONTINUED | OUTPATIENT
Start: 2021-08-17 | End: 2021-08-18

## 2021-08-17 RX ORDER — PANTOPRAZOLE SODIUM 40 MG/1
40 TABLET, DELAYED RELEASE ORAL DAILY
Status: DISCONTINUED | OUTPATIENT
Start: 2021-08-17 | End: 2021-08-21 | Stop reason: HOSPADM

## 2021-08-17 RX ORDER — ALLOPURINOL 100 MG/1
100 TABLET ORAL 2 TIMES DAILY
Status: DISCONTINUED | OUTPATIENT
Start: 2021-08-17 | End: 2021-08-21 | Stop reason: HOSPADM

## 2021-08-17 RX ORDER — POTASSIUM CHLORIDE 20 MEQ/1
20 TABLET, EXTENDED RELEASE ORAL WEEKLY
Status: DISCONTINUED | OUTPATIENT
Start: 2021-08-17 | End: 2021-08-21 | Stop reason: HOSPADM

## 2021-08-17 RX ORDER — METOPROLOL SUCCINATE 100 MG/1
100 TABLET, EXTENDED RELEASE ORAL DAILY
Status: DISCONTINUED | OUTPATIENT
Start: 2021-08-17 | End: 2021-08-21 | Stop reason: HOSPADM

## 2021-08-17 RX ORDER — CLOPIDOGREL BISULFATE 75 MG/1
75 TABLET ORAL DAILY
Status: DISCONTINUED | OUTPATIENT
Start: 2021-08-17 | End: 2021-08-21 | Stop reason: HOSPADM

## 2021-08-17 RX ORDER — ENOXAPARIN SODIUM 100 MG/ML
1 INJECTION SUBCUTANEOUS
Status: DISCONTINUED | OUTPATIENT
Start: 2021-08-17 | End: 2021-08-21

## 2021-08-17 RX ORDER — FUROSEMIDE 40 MG/1
40 TABLET ORAL 2 TIMES DAILY
Status: DISCONTINUED | OUTPATIENT
Start: 2021-08-17 | End: 2021-08-17

## 2021-08-17 RX ORDER — ATORVASTATIN CALCIUM 40 MG/1
80 TABLET, FILM COATED ORAL NIGHTLY
Status: DISCONTINUED | OUTPATIENT
Start: 2021-08-17 | End: 2021-08-21 | Stop reason: HOSPADM

## 2021-08-17 RX ORDER — CHOLECALCIFEROL (VITAMIN D3) 25 MCG
1000 TABLET ORAL DAILY
Status: DISCONTINUED | OUTPATIENT
Start: 2021-08-17 | End: 2021-08-21 | Stop reason: HOSPADM

## 2021-08-17 RX ORDER — IBUPROFEN 200 MG
16 TABLET ORAL
Status: DISCONTINUED | OUTPATIENT
Start: 2021-08-17 | End: 2021-08-21 | Stop reason: HOSPADM

## 2021-08-17 RX ORDER — LOPERAMIDE HYDROCHLORIDE 2 MG/1
2 CAPSULE ORAL EVERY 6 HOURS PRN
Status: DISCONTINUED | OUTPATIENT
Start: 2021-08-17 | End: 2021-08-21 | Stop reason: HOSPADM

## 2021-08-17 RX ORDER — ASCORBIC ACID 500 MG
500 TABLET ORAL 2 TIMES DAILY
Status: DISCONTINUED | OUTPATIENT
Start: 2021-08-17 | End: 2021-08-21 | Stop reason: HOSPADM

## 2021-08-17 RX ORDER — OXYCODONE HYDROCHLORIDE 10 MG/1
10 TABLET ORAL EVERY 4 HOURS PRN
Status: DISCONTINUED | OUTPATIENT
Start: 2021-08-17 | End: 2021-08-21 | Stop reason: HOSPADM

## 2021-08-17 RX ORDER — ONDANSETRON 2 MG/ML
4 INJECTION INTRAMUSCULAR; INTRAVENOUS EVERY 8 HOURS PRN
Status: DISCONTINUED | OUTPATIENT
Start: 2021-08-17 | End: 2021-08-21 | Stop reason: HOSPADM

## 2021-08-17 RX ORDER — TALC
6 POWDER (GRAM) TOPICAL NIGHTLY PRN
Status: DISCONTINUED | OUTPATIENT
Start: 2021-08-17 | End: 2021-08-21 | Stop reason: HOSPADM

## 2021-08-17 RX ORDER — IBUPROFEN 200 MG
24 TABLET ORAL
Status: DISCONTINUED | OUTPATIENT
Start: 2021-08-17 | End: 2021-08-21 | Stop reason: HOSPADM

## 2021-08-17 RX ADMIN — OXYCODONE HYDROCHLORIDE AND ACETAMINOPHEN 500 MG: 500 TABLET ORAL at 10:08

## 2021-08-17 RX ADMIN — OXYCODONE HYDROCHLORIDE AND ACETAMINOPHEN 500 MG: 500 TABLET ORAL at 08:08

## 2021-08-17 RX ADMIN — POTASSIUM CHLORIDE 20 MEQ: 1500 TABLET, EXTENDED RELEASE ORAL at 10:08

## 2021-08-17 RX ADMIN — ALLOPURINOL 100 MG: 100 TABLET ORAL at 08:08

## 2021-08-17 RX ADMIN — ENOXAPARIN SODIUM 100 MG: 100 INJECTION SUBCUTANEOUS at 02:08

## 2021-08-17 RX ADMIN — ATORVASTATIN CALCIUM 80 MG: 40 TABLET, FILM COATED ORAL at 08:08

## 2021-08-17 RX ADMIN — PANTOPRAZOLE SODIUM 40 MG: 40 TABLET, DELAYED RELEASE ORAL at 10:08

## 2021-08-17 RX ADMIN — OXYCODONE HYDROCHLORIDE 10 MG: 10 TABLET ORAL at 08:08

## 2021-08-17 RX ADMIN — REMDESIVIR 200 MG: 100 INJECTION, POWDER, LYOPHILIZED, FOR SOLUTION INTRAVENOUS at 03:08

## 2021-08-17 RX ADMIN — DEXAMETHASONE 6 MG: 4 TABLET ORAL at 12:08

## 2021-08-17 RX ADMIN — AMIODARONE HYDROCHLORIDE 300 MG: 200 TABLET ORAL at 10:08

## 2021-08-17 RX ADMIN — ENOXAPARIN SODIUM 100 MG: 100 INJECTION SUBCUTANEOUS at 03:08

## 2021-08-17 RX ADMIN — METOPROLOL SUCCINATE 100 MG: 100 TABLET, EXTENDED RELEASE ORAL at 10:08

## 2021-08-17 RX ADMIN — ALLOPURINOL 100 MG: 100 TABLET ORAL at 10:08

## 2021-08-17 RX ADMIN — THERA TABS 1 TABLET: TAB at 10:08

## 2021-08-17 RX ADMIN — ASPIRIN 325 MG: 325 TABLET, COATED ORAL at 08:08

## 2021-08-17 RX ADMIN — DEXAMETHASONE 6 MG: 4 TABLET ORAL at 10:08

## 2021-08-17 RX ADMIN — SACUBITRIL AND VALSARTAN 1 TABLET: 49; 51 TABLET, FILM COATED ORAL at 02:08

## 2021-08-17 RX ADMIN — CLOPIDOGREL 75 MG: 75 TABLET, FILM COATED ORAL at 10:08

## 2021-08-17 RX ADMIN — Medication 1000 UNITS: at 10:08

## 2021-08-18 LAB
ALBUMIN SERPL BCP-MCNC: 3.4 G/DL (ref 3.5–5.2)
ALP SERPL-CCNC: 56 U/L (ref 55–135)
ALT SERPL W/O P-5'-P-CCNC: 43 U/L (ref 10–44)
ANION GAP SERPL CALC-SCNC: 12 MMOL/L (ref 8–16)
AST SERPL-CCNC: 40 U/L (ref 10–40)
BASOPHILS # BLD AUTO: 0.01 K/UL (ref 0–0.2)
BASOPHILS NFR BLD: 0.1 % (ref 0–1.9)
BILIRUB SERPL-MCNC: 0.5 MG/DL (ref 0.1–1)
BUN SERPL-MCNC: 41 MG/DL (ref 8–23)
CALCIUM SERPL-MCNC: 8.6 MG/DL (ref 8.7–10.5)
CHLORIDE SERPL-SCNC: 107 MMOL/L (ref 95–110)
CO2 SERPL-SCNC: 19 MMOL/L (ref 23–29)
CREAT SERPL-MCNC: 1.4 MG/DL (ref 0.5–1.4)
DIFFERENTIAL METHOD: ABNORMAL
EOSINOPHIL # BLD AUTO: 0 K/UL (ref 0–0.5)
EOSINOPHIL NFR BLD: 0 % (ref 0–8)
ERYTHROCYTE [DISTWIDTH] IN BLOOD BY AUTOMATED COUNT: 14.3 % (ref 11.5–14.5)
EST. GFR  (AFRICAN AMERICAN): 58.8 ML/MIN/1.73 M^2
EST. GFR  (NON AFRICAN AMERICAN): 50.9 ML/MIN/1.73 M^2
GLUCOSE SERPL-MCNC: 114 MG/DL (ref 70–110)
HCT VFR BLD AUTO: 51 % (ref 40–54)
HGB BLD-MCNC: 16.4 G/DL (ref 14–18)
IMM GRANULOCYTES # BLD AUTO: 0.03 K/UL (ref 0–0.04)
IMM GRANULOCYTES NFR BLD AUTO: 0.4 % (ref 0–0.5)
LYMPHOCYTES # BLD AUTO: 1.4 K/UL (ref 1–4.8)
LYMPHOCYTES NFR BLD: 17.9 % (ref 18–48)
MAGNESIUM SERPL-MCNC: 2.2 MG/DL (ref 1.6–2.6)
MCH RBC QN AUTO: 30.1 PG (ref 27–31)
MCHC RBC AUTO-ENTMCNC: 32.2 G/DL (ref 32–36)
MCV RBC AUTO: 94 FL (ref 82–98)
MONOCYTES # BLD AUTO: 0.7 K/UL (ref 0.3–1)
MONOCYTES NFR BLD: 8.9 % (ref 4–15)
NEUTROPHILS # BLD AUTO: 5.9 K/UL (ref 1.8–7.7)
NEUTROPHILS NFR BLD: 72.7 % (ref 38–73)
NRBC BLD-RTO: 0 /100 WBC
PHOSPHATE SERPL-MCNC: 3.9 MG/DL (ref 2.7–4.5)
PLATELET # BLD AUTO: 169 K/UL (ref 150–450)
PMV BLD AUTO: 11.5 FL (ref 9.2–12.9)
POTASSIUM SERPL-SCNC: 4.1 MMOL/L (ref 3.5–5.1)
PROT SERPL-MCNC: 6.7 G/DL (ref 6–8.4)
RBC # BLD AUTO: 5.44 M/UL (ref 4.6–6.2)
SODIUM SERPL-SCNC: 138 MMOL/L (ref 136–145)
WBC # BLD AUTO: 8.05 K/UL (ref 3.9–12.7)

## 2021-08-18 PROCEDURE — 83735 ASSAY OF MAGNESIUM: CPT | Performed by: PHYSICIAN ASSISTANT

## 2021-08-18 PROCEDURE — 63600175 PHARM REV CODE 636 W HCPCS: Performed by: PHYSICIAN ASSISTANT

## 2021-08-18 PROCEDURE — 80053 COMPREHEN METABOLIC PANEL: CPT | Performed by: PHYSICIAN ASSISTANT

## 2021-08-18 PROCEDURE — 20600001 HC STEP DOWN PRIVATE ROOM

## 2021-08-18 PROCEDURE — 85025 COMPLETE CBC W/AUTO DIFF WBC: CPT | Performed by: PHYSICIAN ASSISTANT

## 2021-08-18 PROCEDURE — 81001 URINALYSIS AUTO W/SCOPE: CPT | Performed by: PHYSICIAN ASSISTANT

## 2021-08-18 PROCEDURE — 27000207 HC ISOLATION

## 2021-08-18 PROCEDURE — 25000003 PHARM REV CODE 250: Performed by: HOSPITALIST

## 2021-08-18 PROCEDURE — 36415 COLL VENOUS BLD VENIPUNCTURE: CPT | Performed by: PHYSICIAN ASSISTANT

## 2021-08-18 PROCEDURE — 25000003 PHARM REV CODE 250: Performed by: PHYSICIAN ASSISTANT

## 2021-08-18 PROCEDURE — 84100 ASSAY OF PHOSPHORUS: CPT | Performed by: PHYSICIAN ASSISTANT

## 2021-08-18 RX ORDER — ASPIRIN 81 MG/1
81 TABLET ORAL NIGHTLY
Status: DISCONTINUED | OUTPATIENT
Start: 2021-08-18 | End: 2021-08-21 | Stop reason: HOSPADM

## 2021-08-18 RX ORDER — DIPHENHYDRAMINE HCL 25 MG
25 CAPSULE ORAL EVERY 6 HOURS PRN
Status: DISCONTINUED | OUTPATIENT
Start: 2021-08-18 | End: 2021-08-21 | Stop reason: HOSPADM

## 2021-08-18 RX ADMIN — ENOXAPARIN SODIUM 100 MG: 100 INJECTION SUBCUTANEOUS at 04:08

## 2021-08-18 RX ADMIN — GUAIFENESIN AND DEXTROMETHORPHAN HYDROBROMIDE 1 TABLET: 600; 30 TABLET, EXTENDED RELEASE ORAL at 04:08

## 2021-08-18 RX ADMIN — GUAIFENESIN AND DEXTROMETHORPHAN HYDROBROMIDE 1 TABLET: 600; 30 TABLET, EXTENDED RELEASE ORAL at 09:08

## 2021-08-18 RX ADMIN — ALLOPURINOL 100 MG: 100 TABLET ORAL at 08:08

## 2021-08-18 RX ADMIN — OXYCODONE HYDROCHLORIDE 10 MG: 10 TABLET ORAL at 04:08

## 2021-08-18 RX ADMIN — THERA TABS 1 TABLET: TAB at 08:08

## 2021-08-18 RX ADMIN — METOPROLOL SUCCINATE 100 MG: 100 TABLET, EXTENDED RELEASE ORAL at 08:08

## 2021-08-18 RX ADMIN — ALLOPURINOL 100 MG: 100 TABLET ORAL at 09:08

## 2021-08-18 RX ADMIN — ATORVASTATIN CALCIUM 80 MG: 40 TABLET, FILM COATED ORAL at 09:08

## 2021-08-18 RX ADMIN — OXYCODONE HYDROCHLORIDE AND ACETAMINOPHEN 500 MG: 500 TABLET ORAL at 09:08

## 2021-08-18 RX ADMIN — ASPIRIN 81 MG: 81 TABLET, COATED ORAL at 09:08

## 2021-08-18 RX ADMIN — REMDESIVIR 100 MG: 100 INJECTION, POWDER, LYOPHILIZED, FOR SOLUTION INTRAVENOUS at 08:08

## 2021-08-18 RX ADMIN — ENOXAPARIN SODIUM 100 MG: 100 INJECTION SUBCUTANEOUS at 06:08

## 2021-08-18 RX ADMIN — DEXAMETHASONE 6 MG: 4 TABLET ORAL at 08:08

## 2021-08-18 RX ADMIN — CLOPIDOGREL 75 MG: 75 TABLET, FILM COATED ORAL at 08:08

## 2021-08-18 RX ADMIN — PANTOPRAZOLE SODIUM 40 MG: 40 TABLET, DELAYED RELEASE ORAL at 08:08

## 2021-08-18 RX ADMIN — AMIODARONE HYDROCHLORIDE 300 MG: 200 TABLET ORAL at 08:08

## 2021-08-18 RX ADMIN — Medication 1000 UNITS: at 08:08

## 2021-08-18 RX ADMIN — OXYCODONE HYDROCHLORIDE AND ACETAMINOPHEN 500 MG: 500 TABLET ORAL at 08:08

## 2021-08-19 LAB
ALBUMIN SERPL BCP-MCNC: 3.2 G/DL (ref 3.5–5.2)
ALP SERPL-CCNC: 52 U/L (ref 55–135)
ALT SERPL W/O P-5'-P-CCNC: 33 U/L (ref 10–44)
ANION GAP SERPL CALC-SCNC: 13 MMOL/L (ref 8–16)
AST SERPL-CCNC: 30 U/L (ref 10–40)
BACTERIA #/AREA URNS AUTO: NORMAL /HPF
BASOPHILS # BLD AUTO: 0.01 K/UL (ref 0–0.2)
BASOPHILS NFR BLD: 0.2 % (ref 0–1.9)
BILIRUB SERPL-MCNC: 0.5 MG/DL (ref 0.1–1)
BILIRUB UR QL STRIP: NEGATIVE
BUN SERPL-MCNC: 40 MG/DL (ref 8–23)
CALCIUM SERPL-MCNC: 8.1 MG/DL (ref 8.7–10.5)
CHLORIDE SERPL-SCNC: 108 MMOL/L (ref 95–110)
CLARITY UR REFRACT.AUTO: ABNORMAL
CO2 SERPL-SCNC: 19 MMOL/L (ref 23–29)
COLOR UR AUTO: YELLOW
CREAT SERPL-MCNC: 1 MG/DL (ref 0.5–1.4)
CRP SERPL-MCNC: 2.2 MG/L (ref 0–8.2)
DIFFERENTIAL METHOD: ABNORMAL
EOSINOPHIL # BLD AUTO: 0 K/UL (ref 0–0.5)
EOSINOPHIL NFR BLD: 0 % (ref 0–8)
ERYTHROCYTE [DISTWIDTH] IN BLOOD BY AUTOMATED COUNT: 14.2 % (ref 11.5–14.5)
EST. GFR  (AFRICAN AMERICAN): >60 ML/MIN/1.73 M^2
EST. GFR  (NON AFRICAN AMERICAN): >60 ML/MIN/1.73 M^2
FERRITIN SERPL-MCNC: 127 NG/ML (ref 20–300)
GLUCOSE SERPL-MCNC: 108 MG/DL (ref 70–110)
GLUCOSE UR QL STRIP: NEGATIVE
HCT VFR BLD AUTO: 45.3 % (ref 40–54)
HGB BLD-MCNC: 14.7 G/DL (ref 14–18)
HGB UR QL STRIP: NEGATIVE
IMM GRANULOCYTES # BLD AUTO: 0.02 K/UL (ref 0–0.04)
IMM GRANULOCYTES NFR BLD AUTO: 0.3 % (ref 0–0.5)
KETONES UR QL STRIP: NEGATIVE
LDH SERPL L TO P-CCNC: 203 U/L (ref 110–260)
LEUKOCYTE ESTERASE UR QL STRIP: NEGATIVE
LYMPHOCYTES # BLD AUTO: 1 K/UL (ref 1–4.8)
LYMPHOCYTES NFR BLD: 16 % (ref 18–48)
MAGNESIUM SERPL-MCNC: 2.3 MG/DL (ref 1.6–2.6)
MCH RBC QN AUTO: 30.4 PG (ref 27–31)
MCHC RBC AUTO-ENTMCNC: 32.5 G/DL (ref 32–36)
MCV RBC AUTO: 94 FL (ref 82–98)
MICROSCOPIC COMMENT: NORMAL
MONOCYTES # BLD AUTO: 0.6 K/UL (ref 0.3–1)
MONOCYTES NFR BLD: 9 % (ref 4–15)
NEUTROPHILS # BLD AUTO: 4.7 K/UL (ref 1.8–7.7)
NEUTROPHILS NFR BLD: 74.5 % (ref 38–73)
NITRITE UR QL STRIP: NEGATIVE
NRBC BLD-RTO: 0 /100 WBC
PH UR STRIP: 5 [PH] (ref 5–8)
PHOSPHATE SERPL-MCNC: 3.1 MG/DL (ref 2.7–4.5)
PLATELET # BLD AUTO: 125 K/UL (ref 150–450)
PMV BLD AUTO: 11.4 FL (ref 9.2–12.9)
POTASSIUM SERPL-SCNC: 4.1 MMOL/L (ref 3.5–5.1)
PROT SERPL-MCNC: 6.1 G/DL (ref 6–8.4)
PROT UR QL STRIP: NEGATIVE
RBC # BLD AUTO: 4.83 M/UL (ref 4.6–6.2)
RBC #/AREA URNS AUTO: 0 /HPF (ref 0–4)
SODIUM SERPL-SCNC: 140 MMOL/L (ref 136–145)
SP GR UR STRIP: 1.02 (ref 1–1.03)
URATE CRY UR QL COMP ASSIST: NORMAL
URN SPEC COLLECT METH UR: ABNORMAL
WBC # BLD AUTO: 6.31 K/UL (ref 3.9–12.7)
WBC #/AREA URNS AUTO: 0 /HPF (ref 0–5)

## 2021-08-19 PROCEDURE — 20600001 HC STEP DOWN PRIVATE ROOM

## 2021-08-19 PROCEDURE — 36415 COLL VENOUS BLD VENIPUNCTURE: CPT | Performed by: PHYSICIAN ASSISTANT

## 2021-08-19 PROCEDURE — 25000003 PHARM REV CODE 250: Performed by: PHYSICIAN ASSISTANT

## 2021-08-19 PROCEDURE — 85025 COMPLETE CBC W/AUTO DIFF WBC: CPT | Performed by: PHYSICIAN ASSISTANT

## 2021-08-19 PROCEDURE — 83735 ASSAY OF MAGNESIUM: CPT | Performed by: PHYSICIAN ASSISTANT

## 2021-08-19 PROCEDURE — 84100 ASSAY OF PHOSPHORUS: CPT | Performed by: PHYSICIAN ASSISTANT

## 2021-08-19 PROCEDURE — 83615 LACTATE (LD) (LDH) ENZYME: CPT | Performed by: PHYSICIAN ASSISTANT

## 2021-08-19 PROCEDURE — 82728 ASSAY OF FERRITIN: CPT | Performed by: PHYSICIAN ASSISTANT

## 2021-08-19 PROCEDURE — 27000207 HC ISOLATION

## 2021-08-19 PROCEDURE — 25000003 PHARM REV CODE 250: Performed by: HOSPITALIST

## 2021-08-19 PROCEDURE — 80053 COMPREHEN METABOLIC PANEL: CPT | Performed by: PHYSICIAN ASSISTANT

## 2021-08-19 PROCEDURE — 86140 C-REACTIVE PROTEIN: CPT | Performed by: PHYSICIAN ASSISTANT

## 2021-08-19 PROCEDURE — 63600175 PHARM REV CODE 636 W HCPCS: Performed by: PHYSICIAN ASSISTANT

## 2021-08-19 RX ADMIN — OXYCODONE HYDROCHLORIDE AND ACETAMINOPHEN 500 MG: 500 TABLET ORAL at 08:08

## 2021-08-19 RX ADMIN — ENOXAPARIN SODIUM 100 MG: 100 INJECTION SUBCUTANEOUS at 05:08

## 2021-08-19 RX ADMIN — Medication 1000 UNITS: at 10:08

## 2021-08-19 RX ADMIN — PANTOPRAZOLE SODIUM 40 MG: 40 TABLET, DELAYED RELEASE ORAL at 10:08

## 2021-08-19 RX ADMIN — REMDESIVIR 100 MG: 100 INJECTION, POWDER, LYOPHILIZED, FOR SOLUTION INTRAVENOUS at 10:08

## 2021-08-19 RX ADMIN — OXYCODONE HYDROCHLORIDE AND ACETAMINOPHEN 500 MG: 500 TABLET ORAL at 10:08

## 2021-08-19 RX ADMIN — OXYCODONE HYDROCHLORIDE 10 MG: 10 TABLET ORAL at 02:08

## 2021-08-19 RX ADMIN — ALLOPURINOL 100 MG: 100 TABLET ORAL at 08:08

## 2021-08-19 RX ADMIN — ALLOPURINOL 100 MG: 100 TABLET ORAL at 10:08

## 2021-08-19 RX ADMIN — ASPIRIN 81 MG: 81 TABLET, COATED ORAL at 08:08

## 2021-08-19 RX ADMIN — CLOPIDOGREL 75 MG: 75 TABLET, FILM COATED ORAL at 10:08

## 2021-08-19 RX ADMIN — THERA TABS 1 TABLET: TAB at 10:08

## 2021-08-19 RX ADMIN — GUAIFENESIN AND DEXTROMETHORPHAN HYDROBROMIDE 1 TABLET: 600; 30 TABLET, EXTENDED RELEASE ORAL at 10:08

## 2021-08-19 RX ADMIN — DEXAMETHASONE 6 MG: 4 TABLET ORAL at 10:08

## 2021-08-19 RX ADMIN — AMIODARONE HYDROCHLORIDE 300 MG: 200 TABLET ORAL at 10:08

## 2021-08-19 RX ADMIN — GUAIFENESIN AND DEXTROMETHORPHAN HYDROBROMIDE 1 TABLET: 600; 30 TABLET, EXTENDED RELEASE ORAL at 08:08

## 2021-08-19 RX ADMIN — ATORVASTATIN CALCIUM 80 MG: 40 TABLET, FILM COATED ORAL at 08:08

## 2021-08-19 RX ADMIN — METOPROLOL SUCCINATE 100 MG: 100 TABLET, EXTENDED RELEASE ORAL at 10:08

## 2021-08-20 LAB
ALBUMIN SERPL BCP-MCNC: 3.2 G/DL (ref 3.5–5.2)
ALP SERPL-CCNC: 52 U/L (ref 55–135)
ALT SERPL W/O P-5'-P-CCNC: 32 U/L (ref 10–44)
ANION GAP SERPL CALC-SCNC: 9 MMOL/L (ref 8–16)
AST SERPL-CCNC: 27 U/L (ref 10–40)
BASOPHILS # BLD AUTO: 0 K/UL (ref 0–0.2)
BASOPHILS NFR BLD: 0 % (ref 0–1.9)
BILIRUB SERPL-MCNC: 0.4 MG/DL (ref 0.1–1)
BUN SERPL-MCNC: 32 MG/DL (ref 8–23)
CALCIUM SERPL-MCNC: 8.4 MG/DL (ref 8.7–10.5)
CHLORIDE SERPL-SCNC: 108 MMOL/L (ref 95–110)
CO2 SERPL-SCNC: 24 MMOL/L (ref 23–29)
CREAT SERPL-MCNC: 1 MG/DL (ref 0.5–1.4)
DIFFERENTIAL METHOD: ABNORMAL
EOSINOPHIL # BLD AUTO: 0 K/UL (ref 0–0.5)
EOSINOPHIL NFR BLD: 0 % (ref 0–8)
ERYTHROCYTE [DISTWIDTH] IN BLOOD BY AUTOMATED COUNT: 14.2 % (ref 11.5–14.5)
EST. GFR  (AFRICAN AMERICAN): >60 ML/MIN/1.73 M^2
EST. GFR  (NON AFRICAN AMERICAN): >60 ML/MIN/1.73 M^2
GLUCOSE SERPL-MCNC: 112 MG/DL (ref 70–110)
HCT VFR BLD AUTO: 46.6 % (ref 40–54)
HGB BLD-MCNC: 14.9 G/DL (ref 14–18)
IMM GRANULOCYTES # BLD AUTO: 0.04 K/UL (ref 0–0.04)
IMM GRANULOCYTES NFR BLD AUTO: 0.8 % (ref 0–0.5)
LYMPHOCYTES # BLD AUTO: 1.1 K/UL (ref 1–4.8)
LYMPHOCYTES NFR BLD: 20.1 % (ref 18–48)
MAGNESIUM SERPL-MCNC: 2.3 MG/DL (ref 1.6–2.6)
MCH RBC QN AUTO: 30.3 PG (ref 27–31)
MCHC RBC AUTO-ENTMCNC: 32 G/DL (ref 32–36)
MCV RBC AUTO: 95 FL (ref 82–98)
MONOCYTES # BLD AUTO: 0.4 K/UL (ref 0.3–1)
MONOCYTES NFR BLD: 8.4 % (ref 4–15)
NEUTROPHILS # BLD AUTO: 3.7 K/UL (ref 1.8–7.7)
NEUTROPHILS NFR BLD: 70.7 % (ref 38–73)
NRBC BLD-RTO: 0 /100 WBC
PHOSPHATE SERPL-MCNC: 2.9 MG/DL (ref 2.7–4.5)
PLATELET # BLD AUTO: 127 K/UL (ref 150–450)
PMV BLD AUTO: 11.6 FL (ref 9.2–12.9)
POCT GLUCOSE: 189 MG/DL (ref 70–110)
POTASSIUM SERPL-SCNC: 4.6 MMOL/L (ref 3.5–5.1)
PROT SERPL-MCNC: 6 G/DL (ref 6–8.4)
RBC # BLD AUTO: 4.91 M/UL (ref 4.6–6.2)
SODIUM SERPL-SCNC: 141 MMOL/L (ref 136–145)
WBC # BLD AUTO: 5.23 K/UL (ref 3.9–12.7)

## 2021-08-20 PROCEDURE — 85025 COMPLETE CBC W/AUTO DIFF WBC: CPT | Performed by: PHYSICIAN ASSISTANT

## 2021-08-20 PROCEDURE — 84100 ASSAY OF PHOSPHORUS: CPT | Performed by: PHYSICIAN ASSISTANT

## 2021-08-20 PROCEDURE — 80053 COMPREHEN METABOLIC PANEL: CPT | Performed by: PHYSICIAN ASSISTANT

## 2021-08-20 PROCEDURE — 20600001 HC STEP DOWN PRIVATE ROOM

## 2021-08-20 PROCEDURE — 99233 PR SUBSEQUENT HOSPITAL CARE,LEVL III: ICD-10-PCS | Mod: 95,,, | Performed by: INTERNAL MEDICINE

## 2021-08-20 PROCEDURE — 36415 COLL VENOUS BLD VENIPUNCTURE: CPT | Performed by: PHYSICIAN ASSISTANT

## 2021-08-20 PROCEDURE — 25000003 PHARM REV CODE 250: Performed by: PHYSICIAN ASSISTANT

## 2021-08-20 PROCEDURE — 63600175 PHARM REV CODE 636 W HCPCS: Performed by: PHYSICIAN ASSISTANT

## 2021-08-20 PROCEDURE — 25000003 PHARM REV CODE 250: Performed by: HOSPITALIST

## 2021-08-20 PROCEDURE — 27000207 HC ISOLATION

## 2021-08-20 PROCEDURE — 99233 SBSQ HOSP IP/OBS HIGH 50: CPT | Mod: 95,,, | Performed by: INTERNAL MEDICINE

## 2021-08-20 PROCEDURE — 83735 ASSAY OF MAGNESIUM: CPT | Performed by: PHYSICIAN ASSISTANT

## 2021-08-20 RX ADMIN — OXYCODONE HYDROCHLORIDE 10 MG: 10 TABLET ORAL at 09:08

## 2021-08-20 RX ADMIN — GUAIFENESIN AND DEXTROMETHORPHAN HYDROBROMIDE 1 TABLET: 600; 30 TABLET, EXTENDED RELEASE ORAL at 09:08

## 2021-08-20 RX ADMIN — CLOPIDOGREL 75 MG: 75 TABLET, FILM COATED ORAL at 09:08

## 2021-08-20 RX ADMIN — REMDESIVIR 100 MG: 100 INJECTION, POWDER, LYOPHILIZED, FOR SOLUTION INTRAVENOUS at 09:08

## 2021-08-20 RX ADMIN — DEXAMETHASONE 6 MG: 4 TABLET ORAL at 09:08

## 2021-08-20 RX ADMIN — PANTOPRAZOLE SODIUM 40 MG: 40 TABLET, DELAYED RELEASE ORAL at 09:08

## 2021-08-20 RX ADMIN — GUAIFENESIN AND DEXTROMETHORPHAN HYDROBROMIDE 1 TABLET: 600; 30 TABLET, EXTENDED RELEASE ORAL at 08:08

## 2021-08-20 RX ADMIN — ASPIRIN 81 MG: 81 TABLET, COATED ORAL at 08:08

## 2021-08-20 RX ADMIN — METOPROLOL SUCCINATE 100 MG: 100 TABLET, EXTENDED RELEASE ORAL at 09:08

## 2021-08-20 RX ADMIN — THERA TABS 1 TABLET: TAB at 09:08

## 2021-08-20 RX ADMIN — ENOXAPARIN SODIUM 100 MG: 100 INJECTION SUBCUTANEOUS at 06:08

## 2021-08-20 RX ADMIN — ALLOPURINOL 100 MG: 100 TABLET ORAL at 08:08

## 2021-08-20 RX ADMIN — ALLOPURINOL 100 MG: 100 TABLET ORAL at 09:08

## 2021-08-20 RX ADMIN — BENZONATATE 100 MG: 100 CAPSULE ORAL at 09:08

## 2021-08-20 RX ADMIN — OXYCODONE HYDROCHLORIDE AND ACETAMINOPHEN 500 MG: 500 TABLET ORAL at 08:08

## 2021-08-20 RX ADMIN — OXYCODONE HYDROCHLORIDE AND ACETAMINOPHEN 500 MG: 500 TABLET ORAL at 09:08

## 2021-08-20 RX ADMIN — AMIODARONE HYDROCHLORIDE 300 MG: 200 TABLET ORAL at 09:08

## 2021-08-20 RX ADMIN — Medication 1000 UNITS: at 09:08

## 2021-08-20 RX ADMIN — ATORVASTATIN CALCIUM 80 MG: 40 TABLET, FILM COATED ORAL at 08:08

## 2021-08-21 VITALS
RESPIRATION RATE: 38 BRPM | HEIGHT: 67 IN | TEMPERATURE: 98 F | WEIGHT: 222.69 LBS | SYSTOLIC BLOOD PRESSURE: 121 MMHG | BODY MASS INDEX: 34.95 KG/M2 | HEART RATE: 60 BPM | DIASTOLIC BLOOD PRESSURE: 63 MMHG | OXYGEN SATURATION: 95 %

## 2021-08-21 PROBLEM — J12.82 PNEUMONIA DUE TO COVID-19 VIRUS: Status: ACTIVE | Noted: 2021-08-17

## 2021-08-21 PROBLEM — E55.9 VITAMIN D DEFICIENCY: Status: ACTIVE | Noted: 2021-08-21

## 2021-08-21 LAB
ALBUMIN SERPL BCP-MCNC: 3.2 G/DL (ref 3.5–5.2)
ALP SERPL-CCNC: 45 U/L (ref 55–135)
ALT SERPL W/O P-5'-P-CCNC: 34 U/L (ref 10–44)
ANION GAP SERPL CALC-SCNC: 10 MMOL/L (ref 8–16)
AST SERPL-CCNC: 29 U/L (ref 10–40)
BASOPHILS # BLD AUTO: 0.01 K/UL (ref 0–0.2)
BASOPHILS NFR BLD: 0.2 % (ref 0–1.9)
BILIRUB SERPL-MCNC: 0.6 MG/DL (ref 0.1–1)
BUN SERPL-MCNC: 29 MG/DL (ref 8–23)
CALCIUM SERPL-MCNC: 8.2 MG/DL (ref 8.7–10.5)
CHLORIDE SERPL-SCNC: 107 MMOL/L (ref 95–110)
CO2 SERPL-SCNC: 23 MMOL/L (ref 23–29)
CREAT SERPL-MCNC: 0.9 MG/DL (ref 0.5–1.4)
CRP SERPL-MCNC: 0.7 MG/L (ref 0–8.2)
D DIMER PPP IA.FEU-MCNC: 0.2 MG/L FEU
DIFFERENTIAL METHOD: ABNORMAL
EOSINOPHIL # BLD AUTO: 0 K/UL (ref 0–0.5)
EOSINOPHIL NFR BLD: 0 % (ref 0–8)
ERYTHROCYTE [DISTWIDTH] IN BLOOD BY AUTOMATED COUNT: 13.9 % (ref 11.5–14.5)
EST. GFR  (AFRICAN AMERICAN): >60 ML/MIN/1.73 M^2
EST. GFR  (NON AFRICAN AMERICAN): >60 ML/MIN/1.73 M^2
FERRITIN SERPL-MCNC: 109 NG/ML (ref 20–300)
GLUCOSE SERPL-MCNC: 106 MG/DL (ref 70–110)
HCT VFR BLD AUTO: 42.2 % (ref 40–54)
HGB BLD-MCNC: 14.1 G/DL (ref 14–18)
IMM GRANULOCYTES # BLD AUTO: 0.02 K/UL (ref 0–0.04)
IMM GRANULOCYTES NFR BLD AUTO: 0.5 % (ref 0–0.5)
LDH SERPL L TO P-CCNC: 191 U/L (ref 110–260)
LYMPHOCYTES # BLD AUTO: 0.9 K/UL (ref 1–4.8)
LYMPHOCYTES NFR BLD: 21.7 % (ref 18–48)
MAGNESIUM SERPL-MCNC: 2.2 MG/DL (ref 1.6–2.6)
MCH RBC QN AUTO: 30.8 PG (ref 27–31)
MCHC RBC AUTO-ENTMCNC: 33.4 G/DL (ref 32–36)
MCV RBC AUTO: 92 FL (ref 82–98)
MONOCYTES # BLD AUTO: 0.3 K/UL (ref 0.3–1)
MONOCYTES NFR BLD: 8.1 % (ref 4–15)
NEUTROPHILS # BLD AUTO: 2.9 K/UL (ref 1.8–7.7)
NEUTROPHILS NFR BLD: 69.5 % (ref 38–73)
NRBC BLD-RTO: 0 /100 WBC
PHOSPHATE SERPL-MCNC: 2.6 MG/DL (ref 2.7–4.5)
PLATELET # BLD AUTO: 119 K/UL (ref 150–450)
PMV BLD AUTO: 11.5 FL (ref 9.2–12.9)
POTASSIUM SERPL-SCNC: 4.2 MMOL/L (ref 3.5–5.1)
PROT SERPL-MCNC: 5.7 G/DL (ref 6–8.4)
RBC # BLD AUTO: 4.58 M/UL (ref 4.6–6.2)
SODIUM SERPL-SCNC: 140 MMOL/L (ref 136–145)
WBC # BLD AUTO: 4.2 K/UL (ref 3.9–12.7)

## 2021-08-21 PROCEDURE — 85025 COMPLETE CBC W/AUTO DIFF WBC: CPT | Performed by: PHYSICIAN ASSISTANT

## 2021-08-21 PROCEDURE — 83615 LACTATE (LD) (LDH) ENZYME: CPT | Performed by: PHYSICIAN ASSISTANT

## 2021-08-21 PROCEDURE — 99239 HOSP IP/OBS DSCHRG MGMT >30: CPT | Mod: 95,,, | Performed by: INTERNAL MEDICINE

## 2021-08-21 PROCEDURE — 63600175 PHARM REV CODE 636 W HCPCS: Performed by: PHYSICIAN ASSISTANT

## 2021-08-21 PROCEDURE — 84100 ASSAY OF PHOSPHORUS: CPT | Performed by: PHYSICIAN ASSISTANT

## 2021-08-21 PROCEDURE — 25000003 PHARM REV CODE 250: Performed by: PHYSICIAN ASSISTANT

## 2021-08-21 PROCEDURE — 99239 PR HOSPITAL DISCHARGE DAY,>30 MIN: ICD-10-PCS | Mod: 95,,, | Performed by: INTERNAL MEDICINE

## 2021-08-21 PROCEDURE — 83735 ASSAY OF MAGNESIUM: CPT | Performed by: PHYSICIAN ASSISTANT

## 2021-08-21 PROCEDURE — 86140 C-REACTIVE PROTEIN: CPT | Performed by: PHYSICIAN ASSISTANT

## 2021-08-21 PROCEDURE — 85379 FIBRIN DEGRADATION QUANT: CPT | Performed by: INTERNAL MEDICINE

## 2021-08-21 PROCEDURE — 36415 COLL VENOUS BLD VENIPUNCTURE: CPT | Performed by: INTERNAL MEDICINE

## 2021-08-21 PROCEDURE — 82728 ASSAY OF FERRITIN: CPT | Performed by: PHYSICIAN ASSISTANT

## 2021-08-21 PROCEDURE — 80053 COMPREHEN METABOLIC PANEL: CPT | Performed by: PHYSICIAN ASSISTANT

## 2021-08-21 RX ORDER — CHOLECALCIFEROL (VITAMIN D3) 25 MCG
1000 TABLET ORAL DAILY
Status: ON HOLD | COMMUNITY
Start: 2021-08-21 | End: 2021-01-01

## 2021-08-21 RX ORDER — ASPIRIN 81 MG/1
81 TABLET ORAL NIGHTLY
Qty: 30 TABLET | Refills: 11 | Status: ON HOLD | OUTPATIENT
Start: 2021-08-21 | End: 2021-01-01 | Stop reason: SDUPTHER

## 2021-08-21 RX ADMIN — METOPROLOL SUCCINATE 100 MG: 100 TABLET, EXTENDED RELEASE ORAL at 08:08

## 2021-08-21 RX ADMIN — REMDESIVIR 100 MG: 100 INJECTION, POWDER, LYOPHILIZED, FOR SOLUTION INTRAVENOUS at 08:08

## 2021-08-21 RX ADMIN — AMIODARONE HYDROCHLORIDE 300 MG: 200 TABLET ORAL at 08:08

## 2021-08-21 RX ADMIN — PANTOPRAZOLE SODIUM 40 MG: 40 TABLET, DELAYED RELEASE ORAL at 08:08

## 2021-08-21 RX ADMIN — GUAIFENESIN AND DEXTROMETHORPHAN HYDROBROMIDE 1 TABLET: 600; 30 TABLET, EXTENDED RELEASE ORAL at 08:08

## 2021-08-21 RX ADMIN — THERA TABS 1 TABLET: TAB at 08:08

## 2021-08-21 RX ADMIN — DEXAMETHASONE 6 MG: 4 TABLET ORAL at 08:08

## 2021-08-21 RX ADMIN — CLOPIDOGREL 75 MG: 75 TABLET, FILM COATED ORAL at 08:08

## 2021-08-21 RX ADMIN — Medication 1000 UNITS: at 08:08

## 2021-08-21 RX ADMIN — OXYCODONE HYDROCHLORIDE AND ACETAMINOPHEN 500 MG: 500 TABLET ORAL at 08:08

## 2021-08-21 RX ADMIN — ALLOPURINOL 100 MG: 100 TABLET ORAL at 08:08

## 2021-08-24 ENCOUNTER — PATIENT OUTREACH (OUTPATIENT)
Dept: ADMINISTRATIVE | Facility: CLINIC | Age: 71
End: 2021-08-24

## 2021-08-25 ENCOUNTER — TELEPHONE (OUTPATIENT)
Dept: INTERNAL MEDICINE | Facility: CLINIC | Age: 71
End: 2021-08-25

## 2021-08-27 ENCOUNTER — TELEPHONE (OUTPATIENT)
Dept: INTERNAL MEDICINE | Facility: CLINIC | Age: 71
End: 2021-08-27

## 2021-09-15 ENCOUNTER — HOSPITAL ENCOUNTER (EMERGENCY)
Facility: HOSPITAL | Age: 71
Discharge: HOME | End: 2021-09-15
Attending: EMERGENCY MEDICINE
Payer: MEDICARE

## 2021-09-15 VITALS
WEIGHT: 230 LBS | BODY MASS INDEX: 36.1 KG/M2 | OXYGEN SATURATION: 96 % | RESPIRATION RATE: 17 BRPM | HEART RATE: 78 BPM | HEIGHT: 67 IN | SYSTOLIC BLOOD PRESSURE: 125 MMHG | DIASTOLIC BLOOD PRESSURE: 66 MMHG | TEMPERATURE: 99 F

## 2021-09-15 DIAGNOSIS — M70.21 OLECRANON BURSITIS OF RIGHT ELBOW: Primary | ICD-10-CM

## 2021-09-15 PROCEDURE — 99283 EMERGENCY DEPT VISIT LOW MDM: CPT | Mod: ER

## 2021-09-15 RX ORDER — CLINDAMYCIN HYDROCHLORIDE 150 MG/1
150 CAPSULE ORAL 4 TIMES DAILY
Qty: 28 CAPSULE | Refills: 0 | Status: ON HOLD | OUTPATIENT
Start: 2021-09-15 | End: 2021-09-26 | Stop reason: HOSPADM

## 2021-09-18 ENCOUNTER — HOSPITAL ENCOUNTER (INPATIENT)
Facility: HOSPITAL | Age: 71
LOS: 7 days | Discharge: HOME HEALTH | DRG: 558 | End: 2021-09-27
Attending: EMERGENCY MEDICINE | Admitting: HOSPITALIST
Payer: MEDICARE

## 2021-09-18 DIAGNOSIS — L03.90 CELLULITIS: ICD-10-CM

## 2021-09-18 DIAGNOSIS — A49.01 STAPHYLOCOCCUS AUREUS INFECTION: ICD-10-CM

## 2021-09-18 DIAGNOSIS — I10 ESSENTIAL HYPERTENSION: Chronic | ICD-10-CM

## 2021-09-18 DIAGNOSIS — A49.02 MRSA INFECTION: ICD-10-CM

## 2021-09-18 DIAGNOSIS — R73.03 PREDIABETES: ICD-10-CM

## 2021-09-18 DIAGNOSIS — R07.9 CHEST PAIN: ICD-10-CM

## 2021-09-18 DIAGNOSIS — M70.21 OLECRANON BURSITIS OF RIGHT ELBOW: Primary | ICD-10-CM

## 2021-09-18 DIAGNOSIS — Z95.810 PRESENCE OF CARDIAC RESYNCHRONIZATION THERAPY DEFIBRILLATOR (CRT-D): ICD-10-CM

## 2021-09-18 DIAGNOSIS — M10.021 ACUTE IDIOPATHIC GOUT OF RIGHT ELBOW: ICD-10-CM

## 2021-09-18 DIAGNOSIS — I25.10 CORONARY ARTERY DISEASE INVOLVING NATIVE CORONARY ARTERY OF NATIVE HEART WITHOUT ANGINA PECTORIS: Chronic | ICD-10-CM

## 2021-09-18 DIAGNOSIS — B95.7 COAGULASE NEGATIVE STAPHYLOCOCCUS BACTEREMIA: ICD-10-CM

## 2021-09-18 DIAGNOSIS — R78.81 BACTEREMIA: ICD-10-CM

## 2021-09-18 DIAGNOSIS — R52 PAIN: ICD-10-CM

## 2021-09-18 DIAGNOSIS — R23.0 BLUE SKIN: ICD-10-CM

## 2021-09-18 DIAGNOSIS — E66.01 SEVERE OBESITY (BMI 35.0-39.9) WITH COMORBIDITY: Chronic | ICD-10-CM

## 2021-09-18 DIAGNOSIS — I50.42 CHRONIC COMBINED SYSTOLIC AND DIASTOLIC CONGESTIVE HEART FAILURE: ICD-10-CM

## 2021-09-18 DIAGNOSIS — R78.81 COAGULASE NEGATIVE STAPHYLOCOCCUS BACTEREMIA: ICD-10-CM

## 2021-09-18 PROCEDURE — 96365 THER/PROPH/DIAG IV INF INIT: CPT

## 2021-09-18 PROCEDURE — 99285 EMERGENCY DEPT VISIT HI MDM: CPT | Mod: 25

## 2021-09-18 PROCEDURE — 96375 TX/PRO/DX INJ NEW DRUG ADDON: CPT

## 2021-09-18 PROCEDURE — 96366 THER/PROPH/DIAG IV INF ADDON: CPT

## 2021-09-18 PROCEDURE — 99285 PR EMERGENCY DEPT VISIT,LEVEL V: ICD-10-PCS | Mod: GC,CS,, | Performed by: EMERGENCY MEDICINE

## 2021-09-18 PROCEDURE — 99285 EMERGENCY DEPT VISIT HI MDM: CPT | Mod: GC,CS,, | Performed by: EMERGENCY MEDICINE

## 2021-09-19 PROBLEM — U07.1 PNEUMONIA DUE TO COVID-19 VIRUS: Status: RESOLVED | Noted: 2021-08-17 | Resolved: 2021-09-19

## 2021-09-19 PROBLEM — N17.9 AKI (ACUTE KIDNEY INJURY): Status: RESOLVED | Noted: 2021-01-22 | Resolved: 2021-09-19

## 2021-09-19 PROBLEM — J06.9 UPPER RESPIRATORY INFECTION: Status: RESOLVED | Noted: 2021-06-04 | Resolved: 2021-09-19

## 2021-09-19 PROBLEM — J12.82 PNEUMONIA DUE TO COVID-19 VIRUS: Status: RESOLVED | Noted: 2021-08-17 | Resolved: 2021-09-19

## 2021-09-19 PROBLEM — M70.21 OLECRANON BURSITIS OF RIGHT ELBOW: Status: ACTIVE | Noted: 2021-09-19

## 2021-09-19 PROBLEM — L03.90 CELLULITIS: Status: ACTIVE | Noted: 2021-09-19

## 2021-09-19 PROBLEM — I50.43 ACUTE ON CHRONIC COMBINED SYSTOLIC AND DIASTOLIC CONGESTIVE HEART FAILURE: Status: RESOLVED | Noted: 2021-01-19 | Resolved: 2021-09-19

## 2021-09-19 LAB
ALBUMIN SERPL BCP-MCNC: 3.2 G/DL (ref 3.5–5.2)
ALP SERPL-CCNC: 78 U/L (ref 55–135)
ALT SERPL W/O P-5'-P-CCNC: 20 U/L (ref 10–44)
ANION GAP SERPL CALC-SCNC: 13 MMOL/L (ref 8–16)
AST SERPL-CCNC: 25 U/L (ref 10–40)
BACTERIA #/AREA URNS AUTO: ABNORMAL /HPF
BASOPHILS # BLD AUTO: 0.04 K/UL (ref 0–0.2)
BASOPHILS NFR BLD: 0.4 % (ref 0–1.9)
BILIRUB SERPL-MCNC: 1.3 MG/DL (ref 0.1–1)
BILIRUB UR QL STRIP: NEGATIVE
BUN SERPL-MCNC: 14 MG/DL (ref 8–23)
CALCIUM SERPL-MCNC: 9 MG/DL (ref 8.7–10.5)
CHLORIDE SERPL-SCNC: 103 MMOL/L (ref 95–110)
CLARITY UR REFRACT.AUTO: ABNORMAL
CO2 SERPL-SCNC: 22 MMOL/L (ref 23–29)
COLOR UR AUTO: ABNORMAL
CREAT SERPL-MCNC: 0.9 MG/DL (ref 0.5–1.4)
CRP SERPL-MCNC: 193 MG/L (ref 0–8.2)
CTP QC/QA: YES
DIFFERENTIAL METHOD: ABNORMAL
EOSINOPHIL # BLD AUTO: 0 K/UL (ref 0–0.5)
EOSINOPHIL NFR BLD: 0.4 % (ref 0–8)
ERYTHROCYTE [DISTWIDTH] IN BLOOD BY AUTOMATED COUNT: 13.8 % (ref 11.5–14.5)
ERYTHROCYTE [SEDIMENTATION RATE] IN BLOOD BY WESTERGREN METHOD: 55 MM/HR (ref 0–23)
EST. GFR  (AFRICAN AMERICAN): >60 ML/MIN/1.73 M^2
EST. GFR  (NON AFRICAN AMERICAN): >60 ML/MIN/1.73 M^2
GLUCOSE SERPL-MCNC: 104 MG/DL (ref 70–110)
GLUCOSE SERPL-MCNC: 109 MG/DL (ref 70–110)
GLUCOSE UR QL STRIP: NEGATIVE
HCT VFR BLD AUTO: 45.1 % (ref 40–54)
HGB BLD-MCNC: 14.5 G/DL (ref 14–18)
HGB UR QL STRIP: NEGATIVE
HYALINE CASTS UR QL AUTO: 8 /LPF
IMM GRANULOCYTES # BLD AUTO: 0.06 K/UL (ref 0–0.04)
IMM GRANULOCYTES NFR BLD AUTO: 0.5 % (ref 0–0.5)
KETONES UR QL STRIP: NEGATIVE
LACTATE SERPL-SCNC: 1.4 MMOL/L (ref 0.5–2.2)
LEUKOCYTE ESTERASE UR QL STRIP: NEGATIVE
LYMPHOCYTES # BLD AUTO: 2.4 K/UL (ref 1–4.8)
LYMPHOCYTES NFR BLD: 20.9 % (ref 18–48)
MCH RBC QN AUTO: 30.6 PG (ref 27–31)
MCHC RBC AUTO-ENTMCNC: 32.2 G/DL (ref 32–36)
MCV RBC AUTO: 95 FL (ref 82–98)
MICROSCOPIC COMMENT: ABNORMAL
MONOCYTES # BLD AUTO: 1.5 K/UL (ref 0.3–1)
MONOCYTES NFR BLD: 12.7 % (ref 4–15)
NEUTROPHILS # BLD AUTO: 7.4 K/UL (ref 1.8–7.7)
NEUTROPHILS NFR BLD: 65.1 % (ref 38–73)
NITRITE UR QL STRIP: NEGATIVE
NRBC BLD-RTO: 0 /100 WBC
PH UR STRIP: 5 [PH] (ref 5–8)
PLATELET # BLD AUTO: 282 K/UL (ref 150–450)
PMV BLD AUTO: 10.9 FL (ref 9.2–12.9)
POCT GLUCOSE: 109 MG/DL (ref 70–110)
POCT GLUCOSE: 111 MG/DL (ref 70–110)
POCT GLUCOSE: 120 MG/DL (ref 70–110)
POCT GLUCOSE: 127 MG/DL (ref 70–110)
POTASSIUM SERPL-SCNC: 3.9 MMOL/L (ref 3.5–5.1)
PROT SERPL-MCNC: 7.5 G/DL (ref 6–8.4)
PROT UR QL STRIP: ABNORMAL
RBC # BLD AUTO: 4.74 M/UL (ref 4.6–6.2)
RBC #/AREA URNS AUTO: 2 /HPF (ref 0–4)
SARS-COV-2 RDRP RESP QL NAA+PROBE: NEGATIVE
SODIUM SERPL-SCNC: 138 MMOL/L (ref 136–145)
SP GR UR STRIP: 1.02 (ref 1–1.03)
SQUAMOUS #/AREA URNS AUTO: 0 /HPF
URATE SERPL-MCNC: 7.1 MG/DL (ref 3.4–7)
URN SPEC COLLECT METH UR: ABNORMAL
WBC # BLD AUTO: 11.42 K/UL (ref 3.9–12.7)
WBC #/AREA URNS AUTO: 4 /HPF (ref 0–5)

## 2021-09-19 PROCEDURE — G0378 HOSPITAL OBSERVATION PER HR: HCPCS

## 2021-09-19 PROCEDURE — 85025 COMPLETE CBC W/AUTO DIFF WBC: CPT | Performed by: EMERGENCY MEDICINE

## 2021-09-19 PROCEDURE — 87040 BLOOD CULTURE FOR BACTERIA: CPT | Mod: 59 | Performed by: PHYSICIAN ASSISTANT

## 2021-09-19 PROCEDURE — 63600175 PHARM REV CODE 636 W HCPCS: Performed by: EMERGENCY MEDICINE

## 2021-09-19 PROCEDURE — 84550 ASSAY OF BLOOD/URIC ACID: CPT | Performed by: EMERGENCY MEDICINE

## 2021-09-19 PROCEDURE — 85652 RBC SED RATE AUTOMATED: CPT | Performed by: EMERGENCY MEDICINE

## 2021-09-19 PROCEDURE — U0002 COVID-19 LAB TEST NON-CDC: HCPCS

## 2021-09-19 PROCEDURE — 63600175 PHARM REV CODE 636 W HCPCS: Performed by: PHYSICIAN ASSISTANT

## 2021-09-19 PROCEDURE — 86140 C-REACTIVE PROTEIN: CPT | Performed by: EMERGENCY MEDICINE

## 2021-09-19 PROCEDURE — 83605 ASSAY OF LACTIC ACID: CPT | Performed by: EMERGENCY MEDICINE

## 2021-09-19 PROCEDURE — 63600175 PHARM REV CODE 636 W HCPCS

## 2021-09-19 PROCEDURE — 63600175 PHARM REV CODE 636 W HCPCS: Performed by: HOSPITALIST

## 2021-09-19 PROCEDURE — 25000003 PHARM REV CODE 250: Performed by: PHYSICIAN ASSISTANT

## 2021-09-19 PROCEDURE — 25000003 PHARM REV CODE 250: Performed by: HOSPITALIST

## 2021-09-19 PROCEDURE — 99220 PR INITIAL OBSERVATION CARE,LEVL III: ICD-10-PCS | Mod: ,,, | Performed by: PHYSICIAN ASSISTANT

## 2021-09-19 PROCEDURE — 81001 URINALYSIS AUTO W/SCOPE: CPT | Performed by: EMERGENCY MEDICINE

## 2021-09-19 PROCEDURE — 80053 COMPREHEN METABOLIC PANEL: CPT | Performed by: EMERGENCY MEDICINE

## 2021-09-19 PROCEDURE — 25000003 PHARM REV CODE 250: Performed by: EMERGENCY MEDICINE

## 2021-09-19 PROCEDURE — 99220 PR INITIAL OBSERVATION CARE,LEVL III: CPT | Mod: ,,, | Performed by: PHYSICIAN ASSISTANT

## 2021-09-19 RX ORDER — ONDANSETRON 8 MG/1
8 TABLET, ORALLY DISINTEGRATING ORAL EVERY 8 HOURS PRN
Status: DISCONTINUED | OUTPATIENT
Start: 2021-09-19 | End: 2021-09-19

## 2021-09-19 RX ORDER — ACETAMINOPHEN 500 MG
1000 TABLET ORAL EVERY 8 HOURS
Status: DISCONTINUED | OUTPATIENT
Start: 2021-09-19 | End: 2021-09-27 | Stop reason: HOSPADM

## 2021-09-19 RX ORDER — IBUPROFEN 200 MG
16 TABLET ORAL
Status: DISCONTINUED | OUTPATIENT
Start: 2021-09-19 | End: 2021-09-27 | Stop reason: HOSPADM

## 2021-09-19 RX ORDER — FUROSEMIDE 40 MG/1
40 TABLET ORAL DAILY
Status: DISCONTINUED | OUTPATIENT
Start: 2021-09-19 | End: 2021-09-20

## 2021-09-19 RX ORDER — ONDANSETRON 8 MG/1
8 TABLET, ORALLY DISINTEGRATING ORAL EVERY 12 HOURS PRN
Status: DISCONTINUED | OUTPATIENT
Start: 2021-09-19 | End: 2021-09-27 | Stop reason: HOSPADM

## 2021-09-19 RX ORDER — SIMETHICONE 80 MG
1 TABLET,CHEWABLE ORAL 4 TIMES DAILY PRN
Status: DISCONTINUED | OUTPATIENT
Start: 2021-09-19 | End: 2021-09-27 | Stop reason: HOSPADM

## 2021-09-19 RX ORDER — ALLOPURINOL 100 MG/1
100 TABLET ORAL 2 TIMES DAILY
Status: DISCONTINUED | OUTPATIENT
Start: 2021-09-19 | End: 2021-09-27 | Stop reason: HOSPADM

## 2021-09-19 RX ORDER — LANOLIN ALCOHOL/MO/W.PET/CERES
800 CREAM (GRAM) TOPICAL
Status: DISCONTINUED | OUTPATIENT
Start: 2021-09-19 | End: 2021-09-20 | Stop reason: ALTCHOICE

## 2021-09-19 RX ORDER — SODIUM,POTASSIUM PHOSPHATES 280-250MG
2 POWDER IN PACKET (EA) ORAL
Status: DISCONTINUED | OUTPATIENT
Start: 2021-09-19 | End: 2021-09-19

## 2021-09-19 RX ORDER — METOPROLOL SUCCINATE 100 MG/1
100 TABLET, EXTENDED RELEASE ORAL DAILY
Status: DISCONTINUED | OUTPATIENT
Start: 2021-09-19 | End: 2021-09-21

## 2021-09-19 RX ORDER — KETOROLAC TROMETHAMINE 30 MG/ML
15 INJECTION, SOLUTION INTRAMUSCULAR; INTRAVENOUS EVERY 6 HOURS
Status: COMPLETED | OUTPATIENT
Start: 2021-09-19 | End: 2021-09-21

## 2021-09-19 RX ORDER — TALC
6 POWDER (GRAM) TOPICAL NIGHTLY PRN
Status: DISCONTINUED | OUTPATIENT
Start: 2021-09-19 | End: 2021-09-27 | Stop reason: HOSPADM

## 2021-09-19 RX ORDER — INSULIN ASPART 100 [IU]/ML
0-5 INJECTION, SOLUTION INTRAVENOUS; SUBCUTANEOUS
Status: DISCONTINUED | OUTPATIENT
Start: 2021-09-19 | End: 2021-09-27 | Stop reason: HOSPADM

## 2021-09-19 RX ORDER — HYDROMORPHONE HYDROCHLORIDE 1 MG/ML
1 INJECTION, SOLUTION INTRAMUSCULAR; INTRAVENOUS; SUBCUTANEOUS
Status: COMPLETED | OUTPATIENT
Start: 2021-09-19 | End: 2021-09-19

## 2021-09-19 RX ORDER — HYDROMORPHONE HYDROCHLORIDE 1 MG/ML
1 INJECTION, SOLUTION INTRAMUSCULAR; INTRAVENOUS; SUBCUTANEOUS
Status: DISCONTINUED | OUTPATIENT
Start: 2021-09-19 | End: 2021-09-19

## 2021-09-19 RX ORDER — PROCHLORPERAZINE EDISYLATE 5 MG/ML
5 INJECTION INTRAMUSCULAR; INTRAVENOUS EVERY 8 HOURS PRN
Status: DISCONTINUED | OUTPATIENT
Start: 2021-09-19 | End: 2021-09-27 | Stop reason: HOSPADM

## 2021-09-19 RX ORDER — ENOXAPARIN SODIUM 100 MG/ML
40 INJECTION SUBCUTANEOUS EVERY 24 HOURS
Status: DISCONTINUED | OUTPATIENT
Start: 2021-09-19 | End: 2021-09-27 | Stop reason: HOSPADM

## 2021-09-19 RX ORDER — MAG HYDROX/ALUMINUM HYD/SIMETH 200-200-20
30 SUSPENSION, ORAL (FINAL DOSE FORM) ORAL 4 TIMES DAILY PRN
Status: DISCONTINUED | OUTPATIENT
Start: 2021-09-19 | End: 2021-09-27 | Stop reason: HOSPADM

## 2021-09-19 RX ORDER — NALOXONE HCL 0.4 MG/ML
0.02 VIAL (ML) INJECTION
Status: DISCONTINUED | OUTPATIENT
Start: 2021-09-19 | End: 2021-09-27 | Stop reason: HOSPADM

## 2021-09-19 RX ORDER — BISACODYL 10 MG
10 SUPPOSITORY, RECTAL RECTAL DAILY PRN
Status: DISCONTINUED | OUTPATIENT
Start: 2021-09-19 | End: 2021-09-27 | Stop reason: HOSPADM

## 2021-09-19 RX ORDER — ATORVASTATIN CALCIUM 20 MG/1
80 TABLET, FILM COATED ORAL NIGHTLY
Status: DISCONTINUED | OUTPATIENT
Start: 2021-09-19 | End: 2021-09-27 | Stop reason: HOSPADM

## 2021-09-19 RX ORDER — FAMOTIDINE 20 MG/1
20 TABLET, FILM COATED ORAL 2 TIMES DAILY
Status: DISCONTINUED | OUTPATIENT
Start: 2021-09-19 | End: 2021-09-27 | Stop reason: HOSPADM

## 2021-09-19 RX ORDER — PROCHLORPERAZINE EDISYLATE 5 MG/ML
5 INJECTION INTRAMUSCULAR; INTRAVENOUS EVERY 6 HOURS PRN
Status: DISCONTINUED | OUTPATIENT
Start: 2021-09-19 | End: 2021-09-19

## 2021-09-19 RX ORDER — OXYCODONE HYDROCHLORIDE 5 MG/1
5 TABLET ORAL EVERY 6 HOURS PRN
Status: DISCONTINUED | OUTPATIENT
Start: 2021-09-19 | End: 2021-09-27 | Stop reason: HOSPADM

## 2021-09-19 RX ORDER — IPRATROPIUM BROMIDE AND ALBUTEROL SULFATE 2.5; .5 MG/3ML; MG/3ML
3 SOLUTION RESPIRATORY (INHALATION) EVERY 4 HOURS PRN
Status: DISCONTINUED | OUTPATIENT
Start: 2021-09-19 | End: 2021-09-20

## 2021-09-19 RX ORDER — POLYETHYLENE GLYCOL 3350 17 G/17G
17 POWDER, FOR SOLUTION ORAL DAILY
Status: DISCONTINUED | OUTPATIENT
Start: 2021-09-19 | End: 2021-09-27 | Stop reason: HOSPADM

## 2021-09-19 RX ORDER — IBUPROFEN 200 MG
24 TABLET ORAL
Status: DISCONTINUED | OUTPATIENT
Start: 2021-09-19 | End: 2021-09-27 | Stop reason: HOSPADM

## 2021-09-19 RX ORDER — ASPIRIN 81 MG/1
81 TABLET ORAL NIGHTLY
Status: DISCONTINUED | OUTPATIENT
Start: 2021-09-19 | End: 2021-09-27 | Stop reason: HOSPADM

## 2021-09-19 RX ORDER — CLOPIDOGREL BISULFATE 75 MG/1
75 TABLET ORAL DAILY
Status: DISCONTINUED | OUTPATIENT
Start: 2021-09-19 | End: 2021-09-27 | Stop reason: HOSPADM

## 2021-09-19 RX ORDER — SODIUM CHLORIDE 0.9 % (FLUSH) 0.9 %
5 SYRINGE (ML) INJECTION
Status: DISCONTINUED | OUTPATIENT
Start: 2021-09-19 | End: 2021-09-27 | Stop reason: HOSPADM

## 2021-09-19 RX ORDER — GLUCAGON 1 MG
1 KIT INJECTION
Status: DISCONTINUED | OUTPATIENT
Start: 2021-09-19 | End: 2021-09-27 | Stop reason: HOSPADM

## 2021-09-19 RX ADMIN — FUROSEMIDE 40 MG: 40 TABLET ORAL at 08:09

## 2021-09-19 RX ADMIN — VANCOMYCIN HYDROCHLORIDE 2000 MG: 10 INJECTION, POWDER, LYOPHILIZED, FOR SOLUTION INTRAVENOUS at 01:09

## 2021-09-19 RX ADMIN — METOPROLOL SUCCINATE 100 MG: 50 TABLET, EXTENDED RELEASE ORAL at 08:09

## 2021-09-19 RX ADMIN — ACETAMINOPHEN 1000 MG: 500 TABLET ORAL at 06:09

## 2021-09-19 RX ADMIN — ALLOPURINOL 100 MG: 100 TABLET ORAL at 08:09

## 2021-09-19 RX ADMIN — HYDROMORPHONE HYDROCHLORIDE 1 MG: 1 INJECTION, SOLUTION INTRAMUSCULAR; INTRAVENOUS; SUBCUTANEOUS at 12:09

## 2021-09-19 RX ADMIN — POLYETHYLENE GLYCOL 3350 17 G: 17 POWDER, FOR SOLUTION ORAL at 08:09

## 2021-09-19 RX ADMIN — ACETAMINOPHEN 1000 MG: 500 TABLET ORAL at 11:09

## 2021-09-19 RX ADMIN — FAMOTIDINE 20 MG: 20 TABLET ORAL at 08:09

## 2021-09-19 RX ADMIN — SACUBITRIL AND VALSARTAN 1 TABLET: 49; 51 TABLET, FILM COATED ORAL at 10:09

## 2021-09-19 RX ADMIN — VANCOMYCIN HYDROCHLORIDE 1500 MG: 1.5 INJECTION, POWDER, LYOPHILIZED, FOR SOLUTION INTRAVENOUS at 12:09

## 2021-09-19 RX ADMIN — SACUBITRIL AND VALSARTAN 1 TABLET: 49; 51 TABLET, FILM COATED ORAL at 08:09

## 2021-09-19 RX ADMIN — KETOROLAC TROMETHAMINE 15 MG: 30 INJECTION, SOLUTION INTRAMUSCULAR; INTRAVENOUS at 11:09

## 2021-09-19 RX ADMIN — AMIODARONE HYDROCHLORIDE 300 MG: 200 TABLET ORAL at 08:09

## 2021-09-19 RX ADMIN — ENOXAPARIN SODIUM 40 MG: 100 INJECTION SUBCUTANEOUS at 05:09

## 2021-09-19 RX ADMIN — KETOROLAC TROMETHAMINE 15 MG: 30 INJECTION, SOLUTION INTRAMUSCULAR; INTRAVENOUS at 06:09

## 2021-09-19 RX ADMIN — CLOPIDOGREL 75 MG: 75 TABLET, FILM COATED ORAL at 08:09

## 2021-09-19 RX ADMIN — OXYCODONE 5 MG: 5 TABLET ORAL at 08:09

## 2021-09-19 RX ADMIN — HYDROMORPHONE HYDROCHLORIDE 1 MG: 1 INJECTION, SOLUTION INTRAMUSCULAR; INTRAVENOUS; SUBCUTANEOUS at 04:09

## 2021-09-19 RX ADMIN — ACETAMINOPHEN 1000 MG: 500 TABLET ORAL at 02:09

## 2021-09-19 RX ADMIN — ATORVASTATIN CALCIUM 80 MG: 20 TABLET, FILM COATED ORAL at 08:09

## 2021-09-19 RX ADMIN — ASPIRIN 81 MG: 81 TABLET, COATED ORAL at 08:09

## 2021-09-20 PROBLEM — R78.81 BACTEREMIA DUE TO STAPHYLOCOCCUS: Status: ACTIVE | Noted: 2021-09-20

## 2021-09-20 PROBLEM — B95.8 BACTEREMIA DUE TO STAPHYLOCOCCUS: Status: ACTIVE | Noted: 2021-09-20

## 2021-09-20 LAB
ANION GAP SERPL CALC-SCNC: 13 MMOL/L (ref 8–16)
BASOPHILS # BLD AUTO: 0.03 K/UL (ref 0–0.2)
BASOPHILS NFR BLD: 0.3 % (ref 0–1.9)
BUN SERPL-MCNC: 24 MG/DL (ref 8–23)
CALCIUM SERPL-MCNC: 8.7 MG/DL (ref 8.7–10.5)
CHLORIDE SERPL-SCNC: 102 MMOL/L (ref 95–110)
CO2 SERPL-SCNC: 22 MMOL/L (ref 23–29)
CREAT SERPL-MCNC: 1.3 MG/DL (ref 0.5–1.4)
DIFFERENTIAL METHOD: ABNORMAL
EOSINOPHIL # BLD AUTO: 0 K/UL (ref 0–0.5)
EOSINOPHIL NFR BLD: 0.3 % (ref 0–8)
ERYTHROCYTE [DISTWIDTH] IN BLOOD BY AUTOMATED COUNT: 13.5 % (ref 11.5–14.5)
EST. GFR  (AFRICAN AMERICAN): >60 ML/MIN/1.73 M^2
EST. GFR  (NON AFRICAN AMERICAN): 55.7 ML/MIN/1.73 M^2
ESTIMATED AVG GLUCOSE: 117 MG/DL (ref 68–131)
GLUCOSE SERPL-MCNC: 136 MG/DL (ref 70–110)
HBA1C MFR BLD: 5.7 % (ref 4–5.6)
HCT VFR BLD AUTO: 42.8 % (ref 40–54)
HGB BLD-MCNC: 13.7 G/DL (ref 14–18)
IMM GRANULOCYTES # BLD AUTO: 0.02 K/UL (ref 0–0.04)
IMM GRANULOCYTES NFR BLD AUTO: 0.2 % (ref 0–0.5)
LYMPHOCYTES # BLD AUTO: 1.5 K/UL (ref 1–4.8)
LYMPHOCYTES NFR BLD: 17.5 % (ref 18–48)
MAGNESIUM SERPL-MCNC: 1.9 MG/DL (ref 1.6–2.6)
MCH RBC QN AUTO: 30.7 PG (ref 27–31)
MCHC RBC AUTO-ENTMCNC: 32 G/DL (ref 32–36)
MCV RBC AUTO: 96 FL (ref 82–98)
MONOCYTES # BLD AUTO: 1 K/UL (ref 0.3–1)
MONOCYTES NFR BLD: 11.5 % (ref 4–15)
NEUTROPHILS # BLD AUTO: 6.2 K/UL (ref 1.8–7.7)
NEUTROPHILS NFR BLD: 70.2 % (ref 38–73)
NRBC BLD-RTO: 0 /100 WBC
PHOSPHATE SERPL-MCNC: 2.8 MG/DL (ref 2.7–4.5)
PLATELET # BLD AUTO: 191 K/UL (ref 150–450)
PMV BLD AUTO: 11.1 FL (ref 9.2–12.9)
POCT GLUCOSE: 127 MG/DL (ref 70–110)
POCT GLUCOSE: 133 MG/DL (ref 70–110)
POCT GLUCOSE: 137 MG/DL (ref 70–110)
POTASSIUM SERPL-SCNC: 4.1 MMOL/L (ref 3.5–5.1)
RBC # BLD AUTO: 4.46 M/UL (ref 4.6–6.2)
SODIUM SERPL-SCNC: 137 MMOL/L (ref 136–145)
VANCOMYCIN TROUGH SERPL-MCNC: 25.7 UG/ML (ref 10–22)
WBC # BLD AUTO: 8.78 K/UL (ref 3.9–12.7)

## 2021-09-20 PROCEDURE — 83735 ASSAY OF MAGNESIUM: CPT | Performed by: PHYSICIAN ASSISTANT

## 2021-09-20 PROCEDURE — 36415 COLL VENOUS BLD VENIPUNCTURE: CPT | Performed by: HOSPITALIST

## 2021-09-20 PROCEDURE — 84100 ASSAY OF PHOSPHORUS: CPT | Performed by: PHYSICIAN ASSISTANT

## 2021-09-20 PROCEDURE — 25000003 PHARM REV CODE 250: Performed by: PHYSICIAN ASSISTANT

## 2021-09-20 PROCEDURE — 99233 SBSQ HOSP IP/OBS HIGH 50: CPT | Mod: ,,, | Performed by: HOSPITALIST

## 2021-09-20 PROCEDURE — 83036 HEMOGLOBIN GLYCOSYLATED A1C: CPT | Performed by: PHYSICIAN ASSISTANT

## 2021-09-20 PROCEDURE — 99223 1ST HOSP IP/OBS HIGH 75: CPT | Mod: ,,, | Performed by: PHYSICIAN ASSISTANT

## 2021-09-20 PROCEDURE — 11000001 HC ACUTE MED/SURG PRIVATE ROOM

## 2021-09-20 PROCEDURE — 63600175 PHARM REV CODE 636 W HCPCS: Performed by: PHYSICIAN ASSISTANT

## 2021-09-20 PROCEDURE — 80202 ASSAY OF VANCOMYCIN: CPT | Performed by: HOSPITALIST

## 2021-09-20 PROCEDURE — 99223 PR INITIAL HOSPITAL CARE,LEVL III: ICD-10-PCS | Mod: ,,, | Performed by: PHYSICIAN ASSISTANT

## 2021-09-20 PROCEDURE — 85025 COMPLETE CBC W/AUTO DIFF WBC: CPT | Performed by: PHYSICIAN ASSISTANT

## 2021-09-20 PROCEDURE — 25000003 PHARM REV CODE 250: Performed by: HOSPITALIST

## 2021-09-20 PROCEDURE — 63600175 PHARM REV CODE 636 W HCPCS: Performed by: HOSPITALIST

## 2021-09-20 PROCEDURE — 87040 BLOOD CULTURE FOR BACTERIA: CPT | Performed by: PHYSICIAN ASSISTANT

## 2021-09-20 PROCEDURE — 80048 BASIC METABOLIC PNL TOTAL CA: CPT | Performed by: PHYSICIAN ASSISTANT

## 2021-09-20 PROCEDURE — 99233 PR SUBSEQUENT HOSPITAL CARE,LEVL III: ICD-10-PCS | Mod: ,,, | Performed by: HOSPITALIST

## 2021-09-20 RX ORDER — DIPHENHYDRAMINE HCL 25 MG
25 CAPSULE ORAL EVERY 6 HOURS PRN
Status: DISCONTINUED | OUTPATIENT
Start: 2021-09-20 | End: 2021-09-27 | Stop reason: HOSPADM

## 2021-09-20 RX ADMIN — AMIODARONE HYDROCHLORIDE 300 MG: 200 TABLET ORAL at 10:09

## 2021-09-20 RX ADMIN — POLYETHYLENE GLYCOL 3350 17 G: 17 POWDER, FOR SOLUTION ORAL at 10:09

## 2021-09-20 RX ADMIN — KETOROLAC TROMETHAMINE 15 MG: 30 INJECTION, SOLUTION INTRAMUSCULAR; INTRAVENOUS at 01:09

## 2021-09-20 RX ADMIN — VANCOMYCIN HYDROCHLORIDE 1500 MG: 1.5 INJECTION, POWDER, LYOPHILIZED, FOR SOLUTION INTRAVENOUS at 01:09

## 2021-09-20 RX ADMIN — SODIUM CHLORIDE 500 ML: 0.9 INJECTION, SOLUTION INTRAVENOUS at 10:09

## 2021-09-20 RX ADMIN — CLOPIDOGREL 75 MG: 75 TABLET, FILM COATED ORAL at 10:09

## 2021-09-20 RX ADMIN — ENOXAPARIN SODIUM 40 MG: 100 INJECTION SUBCUTANEOUS at 05:09

## 2021-09-20 RX ADMIN — SACUBITRIL AND VALSARTAN 1 TABLET: 49; 51 TABLET, FILM COATED ORAL at 10:09

## 2021-09-20 RX ADMIN — KETOROLAC TROMETHAMINE 15 MG: 30 INJECTION, SOLUTION INTRAMUSCULAR; INTRAVENOUS at 05:09

## 2021-09-20 RX ADMIN — ATORVASTATIN CALCIUM 80 MG: 20 TABLET, FILM COATED ORAL at 09:09

## 2021-09-20 RX ADMIN — ASPIRIN 81 MG: 81 TABLET, COATED ORAL at 09:09

## 2021-09-20 RX ADMIN — FAMOTIDINE 20 MG: 20 TABLET ORAL at 10:09

## 2021-09-20 RX ADMIN — DIPHENHYDRAMINE HYDROCHLORIDE 25 MG: 25 CAPSULE ORAL at 03:09

## 2021-09-20 RX ADMIN — ACETAMINOPHEN 1000 MG: 500 TABLET ORAL at 05:09

## 2021-09-20 RX ADMIN — ALLOPURINOL 100 MG: 100 TABLET ORAL at 09:09

## 2021-09-20 RX ADMIN — ACETAMINOPHEN 1000 MG: 500 TABLET ORAL at 01:09

## 2021-09-20 RX ADMIN — SACUBITRIL AND VALSARTAN 1 TABLET: 49; 51 TABLET, FILM COATED ORAL at 09:09

## 2021-09-20 RX ADMIN — ALLOPURINOL 100 MG: 100 TABLET ORAL at 10:09

## 2021-09-20 RX ADMIN — FAMOTIDINE 20 MG: 20 TABLET ORAL at 09:09

## 2021-09-21 PROBLEM — R23.0: Status: ACTIVE | Noted: 2021-09-21

## 2021-09-21 LAB
ANION GAP SERPL CALC-SCNC: 12 MMOL/L (ref 8–16)
APPEARANCE FLD: NORMAL
ASCENDING AORTA: 3.34 CM
AV INDEX (PROSTH): 0.59
AV MEAN GRADIENT: 8 MMHG
AV PEAK GRADIENT: 15 MMHG
AV VALVE AREA: 2.33 CM2
AV VELOCITY RATIO: 0.55
BASOPHILS # BLD AUTO: 0.03 K/UL (ref 0–0.2)
BASOPHILS NFR BLD: 0.3 % (ref 0–1.9)
BODY FLD TYPE: ABNORMAL
BODY FLD TYPE: NORMAL
BSA FOR ECHO PROCEDURE: 2.21 M2
BUN SERPL-MCNC: 23 MG/DL (ref 8–23)
CALCIUM SERPL-MCNC: 8.4 MG/DL (ref 8.7–10.5)
CHLORIDE SERPL-SCNC: 100 MMOL/L (ref 95–110)
CO2 SERPL-SCNC: 22 MMOL/L (ref 23–29)
COLOR FLD: NORMAL
CREAT SERPL-MCNC: 1 MG/DL (ref 0.5–1.4)
CRP SERPL-MCNC: 216.7 MG/L (ref 0–8.2)
CRYSTALS FLD MICRO: POSITIVE
CV ECHO LV RWT: 0.2 CM
DIFFERENTIAL METHOD: ABNORMAL
DOP CALC AO PEAK VEL: 1.92 M/S
DOP CALC AO VTI: 34.77 CM
DOP CALC LVOT AREA: 3.9 CM2
DOP CALC LVOT DIAMETER: 2.24 CM
DOP CALC LVOT PEAK VEL: 1.06 M/S
DOP CALC LVOT STROKE VOLUME: 80.98 CM3
DOP CALCLVOT PEAK VEL VTI: 20.56 CM
E WAVE DECELERATION TIME: 228.3 MSEC
E/A RATIO: 0.63
E/E' RATIO: 9.09 M/S
ECHO LV POSTERIOR WALL: 0.81 CM (ref 0.6–1.1)
EJECTION FRACTION: 15 %
EOSINOPHIL # BLD AUTO: 0 K/UL (ref 0–0.5)
EOSINOPHIL NFR BLD: 0.1 % (ref 0–8)
ERYTHROCYTE [DISTWIDTH] IN BLOOD BY AUTOMATED COUNT: 13.6 % (ref 11.5–14.5)
EST. GFR  (AFRICAN AMERICAN): >60 ML/MIN/1.73 M^2
EST. GFR  (NON AFRICAN AMERICAN): >60 ML/MIN/1.73 M^2
FRACTIONAL SHORTENING: 4 % (ref 28–44)
GLUCOSE SERPL-MCNC: 94 MG/DL (ref 70–110)
GRAM STN SPEC: NORMAL
GRAM STN SPEC: NORMAL
HCT VFR BLD AUTO: 41.5 % (ref 40–54)
HGB BLD-MCNC: 13.5 G/DL (ref 14–18)
IMM GRANULOCYTES # BLD AUTO: 0.03 K/UL (ref 0–0.04)
IMM GRANULOCYTES NFR BLD AUTO: 0.3 % (ref 0–0.5)
INTERVENTRICULAR SEPTUM: 0.81 CM (ref 0.6–1.1)
LA MAJOR: 5 CM
LA MINOR: 4.94 CM
LA WIDTH: 3.92 CM
LEFT ATRIUM SIZE: 4.06 CM
LEFT ATRIUM VOLUME INDEX MOD: 28.1 ML/M2
LEFT ATRIUM VOLUME INDEX: 31.6 ML/M2
LEFT ATRIUM VOLUME MOD: 59.85 CM3
LEFT ATRIUM VOLUME: 67.23 CM3
LEFT INTERNAL DIMENSION IN SYSTOLE: 7.7 CM (ref 2.1–4)
LEFT VENTRICLE DIASTOLIC VOLUME INDEX: 108.78 ML/M2
LEFT VENTRICLE DIASTOLIC VOLUME: 231.71 ML
LEFT VENTRICLE MASS INDEX: 148 G/M2
LEFT VENTRICLE SYSTOLIC VOLUME INDEX: 85.8 ML/M2
LEFT VENTRICLE SYSTOLIC VOLUME: 182.67 ML
LEFT VENTRICULAR INTERNAL DIMENSION IN DIASTOLE: 8 CM (ref 3.5–6)
LEFT VENTRICULAR MASS: 315.33 G
LV LATERAL E/E' RATIO: 7.14 M/S
LV SEPTAL E/E' RATIO: 12.5 M/S
LYMPHOCYTES # BLD AUTO: 1.6 K/UL (ref 1–4.8)
LYMPHOCYTES NFR BLD: 17.3 % (ref 18–48)
LYMPHOCYTES NFR FLD MANUAL: 7 %
MAGNESIUM SERPL-MCNC: 1.8 MG/DL (ref 1.6–2.6)
MCH RBC QN AUTO: 30 PG (ref 27–31)
MCHC RBC AUTO-ENTMCNC: 32.5 G/DL (ref 32–36)
MCV RBC AUTO: 92 FL (ref 82–98)
MONOCYTES # BLD AUTO: 0.9 K/UL (ref 0.3–1)
MONOCYTES NFR BLD: 9.8 % (ref 4–15)
MV A" WAVE DURATION": 9.71 MSEC
MV PEAK A VEL: 0.8 M/S
MV PEAK E VEL: 0.5 M/S
MV STENOSIS PRESSURE HALF TIME: 66.21 MS
MV VALVE AREA P 1/2 METHOD: 3.32 CM2
NEUTROPHILS # BLD AUTO: 6.8 K/UL (ref 1.8–7.7)
NEUTROPHILS NFR BLD: 72.2 % (ref 38–73)
NEUTROPHILS NFR FLD MANUAL: 93 %
NRBC BLD-RTO: 0 /100 WBC
PHOSPHATE SERPL-MCNC: 2.5 MG/DL (ref 2.7–4.5)
PLATELET # BLD AUTO: 205 K/UL (ref 150–450)
PMV BLD AUTO: 11.4 FL (ref 9.2–12.9)
POCT GLUCOSE: 102 MG/DL (ref 70–110)
POCT GLUCOSE: 106 MG/DL (ref 70–110)
POCT GLUCOSE: 115 MG/DL (ref 70–110)
POCT GLUCOSE: 158 MG/DL (ref 70–110)
POTASSIUM SERPL-SCNC: 3.3 MMOL/L (ref 3.5–5.1)
PULM VEIN S/D RATIO: 1.13
PV PEAK D VEL: 0.45 M/S
PV PEAK S VEL: 0.51 M/S
RA MAJOR: 4.23 CM
RA PRESSURE: 3 MMHG
RA WIDTH: 3.19 CM
RBC # BLD AUTO: 4.5 M/UL (ref 4.6–6.2)
RIGHT VENTRICULAR END-DIASTOLIC DIMENSION: 4.16 CM
RV TISSUE DOPPLER FREE WALL SYSTOLIC VELOCITY 1 (APICAL 4 CHAMBER VIEW): 16.04 CM/S
SINUS: 3.25 CM
SODIUM SERPL-SCNC: 134 MMOL/L (ref 136–145)
STJ: 2.89 CM
TDI LATERAL: 0.07 M/S
TDI SEPTAL: 0.04 M/S
TDI: 0.06 M/S
TRICUSPID ANNULAR PLANE SYSTOLIC EXCURSION: 2.09 CM
VANCOMYCIN SERPL-MCNC: 13.8 UG/ML
VANCOMYCIN SERPL-MCNC: 19.7 UG/ML
WBC # BLD AUTO: 9.38 K/UL (ref 3.9–12.7)
WBC # FLD: NORMAL /CU MM

## 2021-09-21 PROCEDURE — 85025 COMPLETE CBC W/AUTO DIFF WBC: CPT | Performed by: PHYSICIAN ASSISTANT

## 2021-09-21 PROCEDURE — 87077 CULTURE AEROBIC IDENTIFY: CPT | Performed by: STUDENT IN AN ORGANIZED HEALTH CARE EDUCATION/TRAINING PROGRAM

## 2021-09-21 PROCEDURE — 86140 C-REACTIVE PROTEIN: CPT | Performed by: STUDENT IN AN ORGANIZED HEALTH CARE EDUCATION/TRAINING PROGRAM

## 2021-09-21 PROCEDURE — 25000003 PHARM REV CODE 250: Performed by: HOSPITALIST

## 2021-09-21 PROCEDURE — 84100 ASSAY OF PHOSPHORUS: CPT | Performed by: PHYSICIAN ASSISTANT

## 2021-09-21 PROCEDURE — 87116 MYCOBACTERIA CULTURE: CPT | Performed by: STUDENT IN AN ORGANIZED HEALTH CARE EDUCATION/TRAINING PROGRAM

## 2021-09-21 PROCEDURE — 25500020 PHARM REV CODE 255: Performed by: HOSPITALIST

## 2021-09-21 PROCEDURE — 87186 SC STD MICRODIL/AGAR DIL: CPT | Performed by: STUDENT IN AN ORGANIZED HEALTH CARE EDUCATION/TRAINING PROGRAM

## 2021-09-21 PROCEDURE — 89060 EXAM SYNOVIAL FLUID CRYSTALS: CPT | Performed by: STUDENT IN AN ORGANIZED HEALTH CARE EDUCATION/TRAINING PROGRAM

## 2021-09-21 PROCEDURE — 99232 PR SUBSEQUENT HOSPITAL CARE,LEVL II: ICD-10-PCS | Mod: ,,, | Performed by: HOSPITALIST

## 2021-09-21 PROCEDURE — 80048 BASIC METABOLIC PNL TOTAL CA: CPT | Performed by: PHYSICIAN ASSISTANT

## 2021-09-21 PROCEDURE — 99233 PR SUBSEQUENT HOSPITAL CARE,LEVL III: ICD-10-PCS | Mod: ,,, | Performed by: PHYSICIAN ASSISTANT

## 2021-09-21 PROCEDURE — 80202 ASSAY OF VANCOMYCIN: CPT | Performed by: HOSPITALIST

## 2021-09-21 PROCEDURE — 11000001 HC ACUTE MED/SURG PRIVATE ROOM

## 2021-09-21 PROCEDURE — 89051 BODY FLUID CELL COUNT: CPT | Performed by: STUDENT IN AN ORGANIZED HEALTH CARE EDUCATION/TRAINING PROGRAM

## 2021-09-21 PROCEDURE — 99233 SBSQ HOSP IP/OBS HIGH 50: CPT | Mod: ,,, | Performed by: PHYSICIAN ASSISTANT

## 2021-09-21 PROCEDURE — 25000003 PHARM REV CODE 250: Performed by: PHYSICIAN ASSISTANT

## 2021-09-21 PROCEDURE — 63600175 PHARM REV CODE 636 W HCPCS: Performed by: PHYSICIAN ASSISTANT

## 2021-09-21 PROCEDURE — 83735 ASSAY OF MAGNESIUM: CPT | Performed by: PHYSICIAN ASSISTANT

## 2021-09-21 PROCEDURE — 87206 SMEAR FLUORESCENT/ACID STAI: CPT | Performed by: STUDENT IN AN ORGANIZED HEALTH CARE EDUCATION/TRAINING PROGRAM

## 2021-09-21 PROCEDURE — 36415 COLL VENOUS BLD VENIPUNCTURE: CPT | Performed by: HOSPITALIST

## 2021-09-21 PROCEDURE — 99232 SBSQ HOSP IP/OBS MODERATE 35: CPT | Mod: ,,, | Performed by: HOSPITALIST

## 2021-09-21 PROCEDURE — 87102 FUNGUS ISOLATION CULTURE: CPT | Performed by: STUDENT IN AN ORGANIZED HEALTH CARE EDUCATION/TRAINING PROGRAM

## 2021-09-21 PROCEDURE — 87070 CULTURE OTHR SPECIMN AEROBIC: CPT | Performed by: STUDENT IN AN ORGANIZED HEALTH CARE EDUCATION/TRAINING PROGRAM

## 2021-09-21 PROCEDURE — 87075 CULTR BACTERIA EXCEPT BLOOD: CPT | Performed by: STUDENT IN AN ORGANIZED HEALTH CARE EDUCATION/TRAINING PROGRAM

## 2021-09-21 PROCEDURE — 87205 SMEAR GRAM STAIN: CPT | Performed by: STUDENT IN AN ORGANIZED HEALTH CARE EDUCATION/TRAINING PROGRAM

## 2021-09-21 PROCEDURE — 63600175 PHARM REV CODE 636 W HCPCS: Performed by: HOSPITALIST

## 2021-09-21 RX ORDER — MORPHINE SULFATE 2 MG/ML
6 INJECTION, SOLUTION INTRAMUSCULAR; INTRAVENOUS ONCE
Status: COMPLETED | OUTPATIENT
Start: 2021-09-21 | End: 2021-09-21

## 2021-09-21 RX ORDER — POTASSIUM CHLORIDE 20 MEQ/1
40 TABLET, EXTENDED RELEASE ORAL ONCE
Status: COMPLETED | OUTPATIENT
Start: 2021-09-21 | End: 2021-09-21

## 2021-09-21 RX ADMIN — AMIODARONE HYDROCHLORIDE 300 MG: 200 TABLET ORAL at 09:09

## 2021-09-21 RX ADMIN — FAMOTIDINE 20 MG: 20 TABLET ORAL at 09:09

## 2021-09-21 RX ADMIN — VANCOMYCIN HYDROCHLORIDE 1500 MG: 1.5 INJECTION, POWDER, LYOPHILIZED, FOR SOLUTION INTRAVENOUS at 09:09

## 2021-09-21 RX ADMIN — KETOROLAC TROMETHAMINE 15 MG: 30 INJECTION, SOLUTION INTRAMUSCULAR; INTRAVENOUS at 11:09

## 2021-09-21 RX ADMIN — FAMOTIDINE 20 MG: 20 TABLET ORAL at 08:09

## 2021-09-21 RX ADMIN — KETOROLAC TROMETHAMINE 15 MG: 30 INJECTION, SOLUTION INTRAMUSCULAR; INTRAVENOUS at 12:09

## 2021-09-21 RX ADMIN — VANCOMYCIN HYDROCHLORIDE 1250 MG: 1.25 INJECTION, POWDER, LYOPHILIZED, FOR SOLUTION INTRAVENOUS at 11:09

## 2021-09-21 RX ADMIN — ENOXAPARIN SODIUM 40 MG: 100 INJECTION SUBCUTANEOUS at 05:09

## 2021-09-21 RX ADMIN — ACETAMINOPHEN 1000 MG: 500 TABLET ORAL at 09:09

## 2021-09-21 RX ADMIN — ALLOPURINOL 100 MG: 100 TABLET ORAL at 09:09

## 2021-09-21 RX ADMIN — OXYCODONE 5 MG: 5 TABLET ORAL at 12:09

## 2021-09-21 RX ADMIN — KETOROLAC TROMETHAMINE 15 MG: 30 INJECTION, SOLUTION INTRAMUSCULAR; INTRAVENOUS at 05:09

## 2021-09-21 RX ADMIN — ALLOPURINOL 100 MG: 100 TABLET ORAL at 08:09

## 2021-09-21 RX ADMIN — CLOPIDOGREL 75 MG: 75 TABLET, FILM COATED ORAL at 09:09

## 2021-09-21 RX ADMIN — ACETAMINOPHEN 1000 MG: 500 TABLET ORAL at 03:09

## 2021-09-21 RX ADMIN — HUMAN ALBUMIN MICROSPHERES AND PERFLUTREN 0.11 MG: 10; .22 INJECTION, SOLUTION INTRAVENOUS at 08:09

## 2021-09-21 RX ADMIN — ACETAMINOPHEN 1000 MG: 500 TABLET ORAL at 05:09

## 2021-09-21 RX ADMIN — MORPHINE SULFATE 6 MG: 2 INJECTION, SOLUTION INTRAMUSCULAR; INTRAVENOUS at 02:09

## 2021-09-21 RX ADMIN — ATORVASTATIN CALCIUM 80 MG: 20 TABLET, FILM COATED ORAL at 08:09

## 2021-09-21 RX ADMIN — POTASSIUM CHLORIDE 40 MEQ: 1500 TABLET, EXTENDED RELEASE ORAL at 09:09

## 2021-09-21 RX ADMIN — ASPIRIN 81 MG: 81 TABLET, COATED ORAL at 08:09

## 2021-09-22 LAB
ANION GAP SERPL CALC-SCNC: 15 MMOL/L (ref 8–16)
BACTERIA BLD CULT: ABNORMAL
BASOPHILS # BLD AUTO: 0.03 K/UL (ref 0–0.2)
BASOPHILS NFR BLD: 0.4 % (ref 0–1.9)
BUN SERPL-MCNC: 20 MG/DL (ref 8–23)
CALCIUM SERPL-MCNC: 8.5 MG/DL (ref 8.7–10.5)
CHLORIDE SERPL-SCNC: 101 MMOL/L (ref 95–110)
CO2 SERPL-SCNC: 21 MMOL/L (ref 23–29)
CREAT SERPL-MCNC: 1 MG/DL (ref 0.5–1.4)
CRP SERPL-MCNC: 172.7 MG/L (ref 0–8.2)
DIFFERENTIAL METHOD: ABNORMAL
EOSINOPHIL # BLD AUTO: 0 K/UL (ref 0–0.5)
EOSINOPHIL NFR BLD: 0.4 % (ref 0–8)
ERYTHROCYTE [DISTWIDTH] IN BLOOD BY AUTOMATED COUNT: 13.9 % (ref 11.5–14.5)
EST. GFR  (AFRICAN AMERICAN): >60 ML/MIN/1.73 M^2
EST. GFR  (NON AFRICAN AMERICAN): >60 ML/MIN/1.73 M^2
GLUCOSE SERPL-MCNC: 141 MG/DL (ref 70–110)
HCT VFR BLD AUTO: 39.8 % (ref 40–54)
HGB BLD-MCNC: 13 G/DL (ref 14–18)
IMM GRANULOCYTES # BLD AUTO: 0.06 K/UL (ref 0–0.04)
IMM GRANULOCYTES NFR BLD AUTO: 0.7 % (ref 0–0.5)
LYMPHOCYTES # BLD AUTO: 1.5 K/UL (ref 1–4.8)
LYMPHOCYTES NFR BLD: 18.7 % (ref 18–48)
MAGNESIUM SERPL-MCNC: 2 MG/DL (ref 1.6–2.6)
MCH RBC QN AUTO: 30.9 PG (ref 27–31)
MCHC RBC AUTO-ENTMCNC: 32.7 G/DL (ref 32–36)
MCV RBC AUTO: 95 FL (ref 82–98)
MONOCYTES # BLD AUTO: 0.7 K/UL (ref 0.3–1)
MONOCYTES NFR BLD: 9 % (ref 4–15)
NEUTROPHILS # BLD AUTO: 5.7 K/UL (ref 1.8–7.7)
NEUTROPHILS NFR BLD: 70.8 % (ref 38–73)
NRBC BLD-RTO: 0 /100 WBC
PATH INTERP FLD-IMP: NORMAL
PHOSPHATE SERPL-MCNC: 2.4 MG/DL (ref 2.7–4.5)
PLATELET # BLD AUTO: 190 K/UL (ref 150–450)
PMV BLD AUTO: 11.8 FL (ref 9.2–12.9)
POCT GLUCOSE: 108 MG/DL (ref 70–110)
POCT GLUCOSE: 122 MG/DL (ref 70–110)
POCT GLUCOSE: 128 MG/DL (ref 70–110)
POCT GLUCOSE: 164 MG/DL (ref 70–110)
POTASSIUM SERPL-SCNC: 3.9 MMOL/L (ref 3.5–5.1)
RBC # BLD AUTO: 4.21 M/UL (ref 4.6–6.2)
SODIUM SERPL-SCNC: 137 MMOL/L (ref 136–145)
WBC # BLD AUTO: 8.11 K/UL (ref 3.9–12.7)

## 2021-09-22 PROCEDURE — 36415 COLL VENOUS BLD VENIPUNCTURE: CPT | Performed by: STUDENT IN AN ORGANIZED HEALTH CARE EDUCATION/TRAINING PROGRAM

## 2021-09-22 PROCEDURE — 83735 ASSAY OF MAGNESIUM: CPT | Performed by: PHYSICIAN ASSISTANT

## 2021-09-22 PROCEDURE — 84100 ASSAY OF PHOSPHORUS: CPT | Performed by: PHYSICIAN ASSISTANT

## 2021-09-22 PROCEDURE — 11000001 HC ACUTE MED/SURG PRIVATE ROOM

## 2021-09-22 PROCEDURE — 63600175 PHARM REV CODE 636 W HCPCS: Performed by: HOSPITALIST

## 2021-09-22 PROCEDURE — 25000003 PHARM REV CODE 250: Performed by: HOSPITALIST

## 2021-09-22 PROCEDURE — 86140 C-REACTIVE PROTEIN: CPT | Performed by: STUDENT IN AN ORGANIZED HEALTH CARE EDUCATION/TRAINING PROGRAM

## 2021-09-22 PROCEDURE — 63600175 PHARM REV CODE 636 W HCPCS: Performed by: PHYSICIAN ASSISTANT

## 2021-09-22 PROCEDURE — 36415 COLL VENOUS BLD VENIPUNCTURE: CPT | Performed by: PHYSICIAN ASSISTANT

## 2021-09-22 PROCEDURE — 99233 SBSQ HOSP IP/OBS HIGH 50: CPT | Mod: ,,, | Performed by: PHYSICIAN ASSISTANT

## 2021-09-22 PROCEDURE — 80048 BASIC METABOLIC PNL TOTAL CA: CPT | Performed by: PHYSICIAN ASSISTANT

## 2021-09-22 PROCEDURE — 99232 PR SUBSEQUENT HOSPITAL CARE,LEVL II: ICD-10-PCS | Mod: ,,, | Performed by: HOSPITALIST

## 2021-09-22 PROCEDURE — 85025 COMPLETE CBC W/AUTO DIFF WBC: CPT | Performed by: PHYSICIAN ASSISTANT

## 2021-09-22 PROCEDURE — 99233 PR SUBSEQUENT HOSPITAL CARE,LEVL III: ICD-10-PCS | Mod: ,,, | Performed by: PHYSICIAN ASSISTANT

## 2021-09-22 PROCEDURE — 99232 SBSQ HOSP IP/OBS MODERATE 35: CPT | Mod: ,,, | Performed by: HOSPITALIST

## 2021-09-22 PROCEDURE — 25000003 PHARM REV CODE 250: Performed by: PHYSICIAN ASSISTANT

## 2021-09-22 RX ORDER — METOPROLOL SUCCINATE 25 MG/1
25 TABLET, EXTENDED RELEASE ORAL DAILY
Status: DISCONTINUED | OUTPATIENT
Start: 2021-09-22 | End: 2021-09-27 | Stop reason: HOSPADM

## 2021-09-22 RX ADMIN — OXYCODONE 5 MG: 5 TABLET ORAL at 08:09

## 2021-09-22 RX ADMIN — ACETAMINOPHEN 1000 MG: 500 TABLET ORAL at 03:09

## 2021-09-22 RX ADMIN — FAMOTIDINE 20 MG: 20 TABLET ORAL at 08:09

## 2021-09-22 RX ADMIN — ALLOPURINOL 100 MG: 100 TABLET ORAL at 08:09

## 2021-09-22 RX ADMIN — METOPROLOL SUCCINATE 25 MG: 25 TABLET, EXTENDED RELEASE ORAL at 08:09

## 2021-09-22 RX ADMIN — ACETAMINOPHEN 1000 MG: 500 TABLET ORAL at 09:09

## 2021-09-22 RX ADMIN — ASPIRIN 81 MG: 81 TABLET, COATED ORAL at 08:09

## 2021-09-22 RX ADMIN — VANCOMYCIN HYDROCHLORIDE 1250 MG: 1.25 INJECTION, POWDER, LYOPHILIZED, FOR SOLUTION INTRAVENOUS at 11:09

## 2021-09-22 RX ADMIN — OXYCODONE 5 MG: 5 TABLET ORAL at 07:09

## 2021-09-22 RX ADMIN — CLOPIDOGREL 75 MG: 75 TABLET, FILM COATED ORAL at 08:09

## 2021-09-22 RX ADMIN — ATORVASTATIN CALCIUM 80 MG: 20 TABLET, FILM COATED ORAL at 08:09

## 2021-09-22 RX ADMIN — ENOXAPARIN SODIUM 40 MG: 100 INJECTION SUBCUTANEOUS at 04:09

## 2021-09-22 RX ADMIN — VANCOMYCIN HYDROCHLORIDE 1250 MG: 1.25 INJECTION, POWDER, LYOPHILIZED, FOR SOLUTION INTRAVENOUS at 10:09

## 2021-09-22 RX ADMIN — AMIODARONE HYDROCHLORIDE 300 MG: 200 TABLET ORAL at 08:09

## 2021-09-23 PROBLEM — M10.021 ACUTE IDIOPATHIC GOUT OF RIGHT ELBOW: Status: ACTIVE | Noted: 2021-09-23

## 2021-09-23 PROBLEM — A49.01 STAPHYLOCOCCUS AUREUS INFECTION: Status: ACTIVE | Noted: 2021-09-23

## 2021-09-23 PROBLEM — B95.7 COAGULASE NEGATIVE STAPHYLOCOCCUS BACTEREMIA: Status: ACTIVE | Noted: 2021-09-20

## 2021-09-23 LAB
ANION GAP SERPL CALC-SCNC: 13 MMOL/L (ref 8–16)
BASOPHILS # BLD AUTO: 0.03 K/UL (ref 0–0.2)
BASOPHILS NFR BLD: 0.4 % (ref 0–1.9)
BUN SERPL-MCNC: 14 MG/DL (ref 8–23)
CALCIUM SERPL-MCNC: 8.4 MG/DL (ref 8.7–10.5)
CHLORIDE SERPL-SCNC: 103 MMOL/L (ref 95–110)
CO2 SERPL-SCNC: 21 MMOL/L (ref 23–29)
CREAT SERPL-MCNC: 0.8 MG/DL (ref 0.5–1.4)
DIFFERENTIAL METHOD: ABNORMAL
EOSINOPHIL # BLD AUTO: 0.1 K/UL (ref 0–0.5)
EOSINOPHIL NFR BLD: 0.7 % (ref 0–8)
ERYTHROCYTE [DISTWIDTH] IN BLOOD BY AUTOMATED COUNT: 14.1 % (ref 11.5–14.5)
EST. GFR  (AFRICAN AMERICAN): >60 ML/MIN/1.73 M^2
EST. GFR  (NON AFRICAN AMERICAN): >60 ML/MIN/1.73 M^2
GLUCOSE SERPL-MCNC: 90 MG/DL (ref 70–110)
HCT VFR BLD AUTO: 40.4 % (ref 40–54)
HGB BLD-MCNC: 13.2 G/DL (ref 14–18)
IMM GRANULOCYTES # BLD AUTO: 0.07 K/UL (ref 0–0.04)
IMM GRANULOCYTES NFR BLD AUTO: 0.8 % (ref 0–0.5)
LYMPHOCYTES # BLD AUTO: 1.5 K/UL (ref 1–4.8)
LYMPHOCYTES NFR BLD: 17.8 % (ref 18–48)
MAGNESIUM SERPL-MCNC: 1.9 MG/DL (ref 1.6–2.6)
MCH RBC QN AUTO: 30.8 PG (ref 27–31)
MCHC RBC AUTO-ENTMCNC: 32.7 G/DL (ref 32–36)
MCV RBC AUTO: 94 FL (ref 82–98)
MONOCYTES # BLD AUTO: 0.9 K/UL (ref 0.3–1)
MONOCYTES NFR BLD: 10.6 % (ref 4–15)
NEUTROPHILS # BLD AUTO: 5.9 K/UL (ref 1.8–7.7)
NEUTROPHILS NFR BLD: 69.7 % (ref 38–73)
NRBC BLD-RTO: 0 /100 WBC
PHOSPHATE SERPL-MCNC: 2.4 MG/DL (ref 2.7–4.5)
PLATELET # BLD AUTO: 211 K/UL (ref 150–450)
PMV BLD AUTO: 11.2 FL (ref 9.2–12.9)
POCT GLUCOSE: 116 MG/DL (ref 70–110)
POCT GLUCOSE: 121 MG/DL (ref 70–110)
POCT GLUCOSE: 133 MG/DL (ref 70–110)
POCT GLUCOSE: 146 MG/DL (ref 70–110)
POTASSIUM SERPL-SCNC: 3.8 MMOL/L (ref 3.5–5.1)
RBC # BLD AUTO: 4.28 M/UL (ref 4.6–6.2)
SODIUM SERPL-SCNC: 137 MMOL/L (ref 136–145)
VANCOMYCIN TROUGH SERPL-MCNC: 19.9 UG/ML (ref 10–22)
WBC # BLD AUTO: 8.42 K/UL (ref 3.9–12.7)

## 2021-09-23 PROCEDURE — 36415 COLL VENOUS BLD VENIPUNCTURE: CPT | Performed by: PHYSICIAN ASSISTANT

## 2021-09-23 PROCEDURE — 25000003 PHARM REV CODE 250: Performed by: PHYSICIAN ASSISTANT

## 2021-09-23 PROCEDURE — 36415 COLL VENOUS BLD VENIPUNCTURE: CPT | Performed by: HOSPITALIST

## 2021-09-23 PROCEDURE — 83735 ASSAY OF MAGNESIUM: CPT | Performed by: PHYSICIAN ASSISTANT

## 2021-09-23 PROCEDURE — 84100 ASSAY OF PHOSPHORUS: CPT | Performed by: PHYSICIAN ASSISTANT

## 2021-09-23 PROCEDURE — 85025 COMPLETE CBC W/AUTO DIFF WBC: CPT | Performed by: PHYSICIAN ASSISTANT

## 2021-09-23 PROCEDURE — 99232 SBSQ HOSP IP/OBS MODERATE 35: CPT | Mod: ,,, | Performed by: INTERNAL MEDICINE

## 2021-09-23 PROCEDURE — 63600175 PHARM REV CODE 636 W HCPCS: Performed by: PHYSICIAN ASSISTANT

## 2021-09-23 PROCEDURE — 80048 BASIC METABOLIC PNL TOTAL CA: CPT | Performed by: PHYSICIAN ASSISTANT

## 2021-09-23 PROCEDURE — 99233 SBSQ HOSP IP/OBS HIGH 50: CPT | Mod: ,,, | Performed by: PHYSICIAN ASSISTANT

## 2021-09-23 PROCEDURE — 99233 PR SUBSEQUENT HOSPITAL CARE,LEVL III: ICD-10-PCS | Mod: ,,, | Performed by: PHYSICIAN ASSISTANT

## 2021-09-23 PROCEDURE — 25000003 PHARM REV CODE 250: Performed by: INTERNAL MEDICINE

## 2021-09-23 PROCEDURE — 63600175 PHARM REV CODE 636 W HCPCS: Performed by: HOSPITALIST

## 2021-09-23 PROCEDURE — 11000001 HC ACUTE MED/SURG PRIVATE ROOM

## 2021-09-23 PROCEDURE — 99232 PR SUBSEQUENT HOSPITAL CARE,LEVL II: ICD-10-PCS | Mod: ,,, | Performed by: INTERNAL MEDICINE

## 2021-09-23 PROCEDURE — 25000003 PHARM REV CODE 250: Performed by: HOSPITALIST

## 2021-09-23 PROCEDURE — 80202 ASSAY OF VANCOMYCIN: CPT | Performed by: HOSPITALIST

## 2021-09-23 RX ORDER — COLCHICINE 0.6 MG/1
1.2 TABLET, FILM COATED ORAL ONCE
Status: COMPLETED | OUTPATIENT
Start: 2021-09-23 | End: 2021-09-23

## 2021-09-23 RX ORDER — COLCHICINE 0.6 MG/1
0.6 TABLET, FILM COATED ORAL ONCE
Status: COMPLETED | OUTPATIENT
Start: 2021-09-23 | End: 2021-09-23

## 2021-09-23 RX ADMIN — AMIODARONE HYDROCHLORIDE 300 MG: 200 TABLET ORAL at 08:09

## 2021-09-23 RX ADMIN — OXYCODONE 5 MG: 5 TABLET ORAL at 02:09

## 2021-09-23 RX ADMIN — FAMOTIDINE 20 MG: 20 TABLET ORAL at 08:09

## 2021-09-23 RX ADMIN — ALLOPURINOL 100 MG: 100 TABLET ORAL at 08:09

## 2021-09-23 RX ADMIN — ASPIRIN 81 MG: 81 TABLET, COATED ORAL at 08:09

## 2021-09-23 RX ADMIN — COLCHICINE 1.2 MG: 0.6 TABLET, FILM COATED ORAL at 03:09

## 2021-09-23 RX ADMIN — METOPROLOL SUCCINATE 25 MG: 25 TABLET, EXTENDED RELEASE ORAL at 08:09

## 2021-09-23 RX ADMIN — COLCHICINE 0.6 MG: 0.6 TABLET, FILM COATED ORAL at 04:09

## 2021-09-23 RX ADMIN — VANCOMYCIN HYDROCHLORIDE 1250 MG: 1.25 INJECTION, POWDER, LYOPHILIZED, FOR SOLUTION INTRAVENOUS at 12:09

## 2021-09-23 RX ADMIN — ACETAMINOPHEN 1000 MG: 500 TABLET ORAL at 10:09

## 2021-09-23 RX ADMIN — CLOPIDOGREL 75 MG: 75 TABLET, FILM COATED ORAL at 08:09

## 2021-09-23 RX ADMIN — ATORVASTATIN CALCIUM 80 MG: 20 TABLET, FILM COATED ORAL at 08:09

## 2021-09-23 RX ADMIN — ACETAMINOPHEN 1000 MG: 500 TABLET ORAL at 05:09

## 2021-09-23 RX ADMIN — CEFTRIAXONE 2 G: 2 INJECTION, SOLUTION INTRAVENOUS at 10:09

## 2021-09-23 RX ADMIN — ENOXAPARIN SODIUM 40 MG: 100 INJECTION SUBCUTANEOUS at 04:09

## 2021-09-23 RX ADMIN — VANCOMYCIN HYDROCHLORIDE 1250 MG: 1.25 INJECTION, POWDER, LYOPHILIZED, FOR SOLUTION INTRAVENOUS at 10:09

## 2021-09-23 RX ADMIN — OXYCODONE 5 MG: 5 TABLET ORAL at 08:09

## 2021-09-24 PROBLEM — A49.02 MRSA INFECTION: Status: ACTIVE | Noted: 2021-09-23

## 2021-09-24 LAB
ANION GAP SERPL CALC-SCNC: 11 MMOL/L (ref 8–16)
BACTERIA BLD CULT: NORMAL
BACTERIA SPEC AEROBE CULT: ABNORMAL
BASOPHILS # BLD AUTO: 0.04 K/UL (ref 0–0.2)
BASOPHILS NFR BLD: 0.5 % (ref 0–1.9)
BUN SERPL-MCNC: 9 MG/DL (ref 8–23)
CALCIUM SERPL-MCNC: 8.5 MG/DL (ref 8.7–10.5)
CHLORIDE SERPL-SCNC: 104 MMOL/L (ref 95–110)
CO2 SERPL-SCNC: 22 MMOL/L (ref 23–29)
CREAT SERPL-MCNC: 0.8 MG/DL (ref 0.5–1.4)
CRP SERPL-MCNC: 112.3 MG/L (ref 0–8.2)
DIFFERENTIAL METHOD: ABNORMAL
EOSINOPHIL # BLD AUTO: 0.1 K/UL (ref 0–0.5)
EOSINOPHIL NFR BLD: 1.1 % (ref 0–8)
ERYTHROCYTE [DISTWIDTH] IN BLOOD BY AUTOMATED COUNT: 13.9 % (ref 11.5–14.5)
EST. GFR  (AFRICAN AMERICAN): >60 ML/MIN/1.73 M^2
EST. GFR  (NON AFRICAN AMERICAN): >60 ML/MIN/1.73 M^2
GLUCOSE SERPL-MCNC: 101 MG/DL (ref 70–110)
HCT VFR BLD AUTO: 37.6 % (ref 40–54)
HGB BLD-MCNC: 12.3 G/DL (ref 14–18)
IMM GRANULOCYTES # BLD AUTO: 0.05 K/UL (ref 0–0.04)
IMM GRANULOCYTES NFR BLD AUTO: 0.7 % (ref 0–0.5)
LYMPHOCYTES # BLD AUTO: 1.4 K/UL (ref 1–4.8)
LYMPHOCYTES NFR BLD: 18.7 % (ref 18–48)
MAGNESIUM SERPL-MCNC: 1.8 MG/DL (ref 1.6–2.6)
MCH RBC QN AUTO: 30.5 PG (ref 27–31)
MCHC RBC AUTO-ENTMCNC: 32.7 G/DL (ref 32–36)
MCV RBC AUTO: 93 FL (ref 82–98)
MONOCYTES # BLD AUTO: 1 K/UL (ref 0.3–1)
MONOCYTES NFR BLD: 12.9 % (ref 4–15)
NEUTROPHILS # BLD AUTO: 4.9 K/UL (ref 1.8–7.7)
NEUTROPHILS NFR BLD: 66.1 % (ref 38–73)
NRBC BLD-RTO: 0 /100 WBC
PHOSPHATE SERPL-MCNC: 3 MG/DL (ref 2.7–4.5)
PLATELET # BLD AUTO: 233 K/UL (ref 150–450)
PMV BLD AUTO: 11.3 FL (ref 9.2–12.9)
POCT GLUCOSE: 101 MG/DL (ref 70–110)
POCT GLUCOSE: 109 MG/DL (ref 70–110)
POCT GLUCOSE: 113 MG/DL (ref 70–110)
POCT GLUCOSE: 124 MG/DL (ref 70–110)
POTASSIUM SERPL-SCNC: 3.7 MMOL/L (ref 3.5–5.1)
RBC # BLD AUTO: 4.03 M/UL (ref 4.6–6.2)
SODIUM SERPL-SCNC: 137 MMOL/L (ref 136–145)
VANCOMYCIN TROUGH SERPL-MCNC: 18.7 UG/ML (ref 10–22)
WBC # BLD AUTO: 7.47 K/UL (ref 3.9–12.7)

## 2021-09-24 PROCEDURE — 25000003 PHARM REV CODE 250: Performed by: PHYSICIAN ASSISTANT

## 2021-09-24 PROCEDURE — 63600175 PHARM REV CODE 636 W HCPCS: Performed by: HOSPITALIST

## 2021-09-24 PROCEDURE — 63600175 PHARM REV CODE 636 W HCPCS: Performed by: PHYSICIAN ASSISTANT

## 2021-09-24 PROCEDURE — 36415 COLL VENOUS BLD VENIPUNCTURE: CPT | Performed by: PHYSICIAN ASSISTANT

## 2021-09-24 PROCEDURE — 11000001 HC ACUTE MED/SURG PRIVATE ROOM

## 2021-09-24 PROCEDURE — 25000003 PHARM REV CODE 250: Performed by: HOSPITALIST

## 2021-09-24 PROCEDURE — 86140 C-REACTIVE PROTEIN: CPT | Performed by: STUDENT IN AN ORGANIZED HEALTH CARE EDUCATION/TRAINING PROGRAM

## 2021-09-24 PROCEDURE — 99233 SBSQ HOSP IP/OBS HIGH 50: CPT | Mod: ,,, | Performed by: PHYSICIAN ASSISTANT

## 2021-09-24 PROCEDURE — 99232 SBSQ HOSP IP/OBS MODERATE 35: CPT | Mod: ,,, | Performed by: INTERNAL MEDICINE

## 2021-09-24 PROCEDURE — 80202 ASSAY OF VANCOMYCIN: CPT | Performed by: INTERNAL MEDICINE

## 2021-09-24 PROCEDURE — 80048 BASIC METABOLIC PNL TOTAL CA: CPT | Performed by: PHYSICIAN ASSISTANT

## 2021-09-24 PROCEDURE — 36415 COLL VENOUS BLD VENIPUNCTURE: CPT | Performed by: INTERNAL MEDICINE

## 2021-09-24 PROCEDURE — 83735 ASSAY OF MAGNESIUM: CPT | Performed by: PHYSICIAN ASSISTANT

## 2021-09-24 PROCEDURE — 84100 ASSAY OF PHOSPHORUS: CPT | Performed by: PHYSICIAN ASSISTANT

## 2021-09-24 PROCEDURE — 27000207 HC ISOLATION

## 2021-09-24 PROCEDURE — 85025 COMPLETE CBC W/AUTO DIFF WBC: CPT | Performed by: PHYSICIAN ASSISTANT

## 2021-09-24 PROCEDURE — 99232 PR SUBSEQUENT HOSPITAL CARE,LEVL II: ICD-10-PCS | Mod: ,,, | Performed by: INTERNAL MEDICINE

## 2021-09-24 PROCEDURE — 99233 PR SUBSEQUENT HOSPITAL CARE,LEVL III: ICD-10-PCS | Mod: ,,, | Performed by: PHYSICIAN ASSISTANT

## 2021-09-24 RX ADMIN — ALLOPURINOL 100 MG: 100 TABLET ORAL at 08:09

## 2021-09-24 RX ADMIN — FAMOTIDINE 20 MG: 20 TABLET ORAL at 08:09

## 2021-09-24 RX ADMIN — ACETAMINOPHEN 1000 MG: 500 TABLET ORAL at 01:09

## 2021-09-24 RX ADMIN — METOPROLOL SUCCINATE 25 MG: 25 TABLET, EXTENDED RELEASE ORAL at 08:09

## 2021-09-24 RX ADMIN — ATORVASTATIN CALCIUM 80 MG: 20 TABLET, FILM COATED ORAL at 09:09

## 2021-09-24 RX ADMIN — ACETAMINOPHEN 1000 MG: 500 TABLET ORAL at 09:09

## 2021-09-24 RX ADMIN — ASPIRIN 81 MG: 81 TABLET, COATED ORAL at 09:09

## 2021-09-24 RX ADMIN — ENOXAPARIN SODIUM 40 MG: 100 INJECTION SUBCUTANEOUS at 05:09

## 2021-09-24 RX ADMIN — FAMOTIDINE 20 MG: 20 TABLET ORAL at 09:09

## 2021-09-24 RX ADMIN — OXYCODONE 5 MG: 5 TABLET ORAL at 09:09

## 2021-09-24 RX ADMIN — OXYCODONE 5 MG: 5 TABLET ORAL at 05:09

## 2021-09-24 RX ADMIN — ALLOPURINOL 100 MG: 100 TABLET ORAL at 09:09

## 2021-09-24 RX ADMIN — ACETAMINOPHEN 1000 MG: 500 TABLET ORAL at 05:09

## 2021-09-24 RX ADMIN — CLOPIDOGREL 75 MG: 75 TABLET, FILM COATED ORAL at 08:09

## 2021-09-24 RX ADMIN — ONDANSETRON 8 MG: 8 TABLET, ORALLY DISINTEGRATING ORAL at 05:09

## 2021-09-24 RX ADMIN — VANCOMYCIN HYDROCHLORIDE 1250 MG: 1.25 INJECTION, POWDER, LYOPHILIZED, FOR SOLUTION INTRAVENOUS at 01:09

## 2021-09-24 RX ADMIN — AMIODARONE HYDROCHLORIDE 300 MG: 200 TABLET ORAL at 08:09

## 2021-09-25 PROBLEM — B95.7 COAGULASE NEGATIVE STAPHYLOCOCCUS BACTEREMIA: Status: RESOLVED | Noted: 2021-09-20 | Resolved: 2021-09-25

## 2021-09-25 PROBLEM — R78.81 COAGULASE NEGATIVE STAPHYLOCOCCUS BACTEREMIA: Status: RESOLVED | Noted: 2021-09-20 | Resolved: 2021-09-25

## 2021-09-25 LAB
ANION GAP SERPL CALC-SCNC: 11 MMOL/L (ref 8–16)
BACTERIA BLD CULT: NORMAL
BACTERIA BLD CULT: NORMAL
BASOPHILS # BLD AUTO: 0.05 K/UL (ref 0–0.2)
BASOPHILS NFR BLD: 0.7 % (ref 0–1.9)
BUN SERPL-MCNC: 11 MG/DL (ref 8–23)
CALCIUM SERPL-MCNC: 8.5 MG/DL (ref 8.7–10.5)
CHLORIDE SERPL-SCNC: 106 MMOL/L (ref 95–110)
CO2 SERPL-SCNC: 21 MMOL/L (ref 23–29)
CREAT SERPL-MCNC: 0.8 MG/DL (ref 0.5–1.4)
CRP SERPL-MCNC: 104.6 MG/L (ref 0–8.2)
DIFFERENTIAL METHOD: ABNORMAL
EOSINOPHIL # BLD AUTO: 0.1 K/UL (ref 0–0.5)
EOSINOPHIL NFR BLD: 1.3 % (ref 0–8)
ERYTHROCYTE [DISTWIDTH] IN BLOOD BY AUTOMATED COUNT: 14 % (ref 11.5–14.5)
EST. GFR  (AFRICAN AMERICAN): >60 ML/MIN/1.73 M^2
EST. GFR  (NON AFRICAN AMERICAN): >60 ML/MIN/1.73 M^2
GLUCOSE SERPL-MCNC: 103 MG/DL (ref 70–110)
HCT VFR BLD AUTO: 39.2 % (ref 40–54)
HGB BLD-MCNC: 12.5 G/DL (ref 14–18)
IMM GRANULOCYTES # BLD AUTO: 0.11 K/UL (ref 0–0.04)
IMM GRANULOCYTES NFR BLD AUTO: 1.5 % (ref 0–0.5)
LYMPHOCYTES # BLD AUTO: 1.4 K/UL (ref 1–4.8)
LYMPHOCYTES NFR BLD: 20 % (ref 18–48)
MAGNESIUM SERPL-MCNC: 1.9 MG/DL (ref 1.6–2.6)
MCH RBC QN AUTO: 30.3 PG (ref 27–31)
MCHC RBC AUTO-ENTMCNC: 31.9 G/DL (ref 32–36)
MCV RBC AUTO: 95 FL (ref 82–98)
MONOCYTES # BLD AUTO: 0.8 K/UL (ref 0.3–1)
MONOCYTES NFR BLD: 10.4 % (ref 4–15)
NEUTROPHILS # BLD AUTO: 4.8 K/UL (ref 1.8–7.7)
NEUTROPHILS NFR BLD: 66.1 % (ref 38–73)
NRBC BLD-RTO: 0 /100 WBC
PHOSPHATE SERPL-MCNC: 3.8 MG/DL (ref 2.7–4.5)
PLATELET # BLD AUTO: 253 K/UL (ref 150–450)
PMV BLD AUTO: 10.6 FL (ref 9.2–12.9)
POCT GLUCOSE: 100 MG/DL (ref 70–110)
POCT GLUCOSE: 104 MG/DL (ref 70–110)
POCT GLUCOSE: 135 MG/DL (ref 70–110)
POCT GLUCOSE: 98 MG/DL (ref 70–110)
POCT GLUCOSE: 99 MG/DL (ref 70–110)
POTASSIUM SERPL-SCNC: 3.6 MMOL/L (ref 3.5–5.1)
RBC # BLD AUTO: 4.12 M/UL (ref 4.6–6.2)
SODIUM SERPL-SCNC: 138 MMOL/L (ref 136–145)
WBC # BLD AUTO: 7.19 K/UL (ref 3.9–12.7)

## 2021-09-25 PROCEDURE — 99232 PR SUBSEQUENT HOSPITAL CARE,LEVL II: ICD-10-PCS | Mod: ,,, | Performed by: INTERNAL MEDICINE

## 2021-09-25 PROCEDURE — 84100 ASSAY OF PHOSPHORUS: CPT | Performed by: PHYSICIAN ASSISTANT

## 2021-09-25 PROCEDURE — 83735 ASSAY OF MAGNESIUM: CPT | Performed by: PHYSICIAN ASSISTANT

## 2021-09-25 PROCEDURE — C1751 CATH, INF, PER/CENT/MIDLINE: HCPCS

## 2021-09-25 PROCEDURE — 86140 C-REACTIVE PROTEIN: CPT | Performed by: STUDENT IN AN ORGANIZED HEALTH CARE EDUCATION/TRAINING PROGRAM

## 2021-09-25 PROCEDURE — 11000001 HC ACUTE MED/SURG PRIVATE ROOM

## 2021-09-25 PROCEDURE — 63600175 PHARM REV CODE 636 W HCPCS: Performed by: PHYSICIAN ASSISTANT

## 2021-09-25 PROCEDURE — 36415 COLL VENOUS BLD VENIPUNCTURE: CPT | Performed by: PHYSICIAN ASSISTANT

## 2021-09-25 PROCEDURE — 76937 US GUIDE VASCULAR ACCESS: CPT

## 2021-09-25 PROCEDURE — 80048 BASIC METABOLIC PNL TOTAL CA: CPT | Performed by: PHYSICIAN ASSISTANT

## 2021-09-25 PROCEDURE — 27000207 HC ISOLATION

## 2021-09-25 PROCEDURE — 85025 COMPLETE CBC W/AUTO DIFF WBC: CPT | Performed by: PHYSICIAN ASSISTANT

## 2021-09-25 PROCEDURE — 25000003 PHARM REV CODE 250: Performed by: INTERNAL MEDICINE

## 2021-09-25 PROCEDURE — 99232 SBSQ HOSP IP/OBS MODERATE 35: CPT | Mod: ,,, | Performed by: INTERNAL MEDICINE

## 2021-09-25 PROCEDURE — 25000003 PHARM REV CODE 250: Performed by: HOSPITALIST

## 2021-09-25 PROCEDURE — A4216 STERILE WATER/SALINE, 10 ML: HCPCS | Performed by: INTERNAL MEDICINE

## 2021-09-25 PROCEDURE — 36573 INSJ PICC RS&I 5 YR+: CPT

## 2021-09-25 PROCEDURE — 25000003 PHARM REV CODE 250: Performed by: PHYSICIAN ASSISTANT

## 2021-09-25 PROCEDURE — 63600175 PHARM REV CODE 636 W HCPCS: Performed by: HOSPITALIST

## 2021-09-25 RX ORDER — SODIUM CHLORIDE 0.9 % (FLUSH) 0.9 %
10 SYRINGE (ML) INJECTION
Status: DISCONTINUED | OUTPATIENT
Start: 2021-09-25 | End: 2021-09-27 | Stop reason: HOSPADM

## 2021-09-25 RX ORDER — COLCHICINE 0.6 MG/1
0.6 TABLET, FILM COATED ORAL DAILY
Status: DISCONTINUED | OUTPATIENT
Start: 2021-09-25 | End: 2021-09-27 | Stop reason: HOSPADM

## 2021-09-25 RX ORDER — SODIUM CHLORIDE 0.9 % (FLUSH) 0.9 %
10 SYRINGE (ML) INJECTION EVERY 6 HOURS
Status: DISCONTINUED | OUTPATIENT
Start: 2021-09-25 | End: 2021-09-27 | Stop reason: HOSPADM

## 2021-09-25 RX ORDER — HYDROCODONE BITARTRATE AND ACETAMINOPHEN 5; 325 MG/1; MG/1
1 TABLET ORAL ONCE
Status: COMPLETED | OUTPATIENT
Start: 2021-09-25 | End: 2021-09-26

## 2021-09-25 RX ADMIN — FAMOTIDINE 20 MG: 20 TABLET ORAL at 09:09

## 2021-09-25 RX ADMIN — VANCOMYCIN HYDROCHLORIDE 1250 MG: 1.25 INJECTION, POWDER, LYOPHILIZED, FOR SOLUTION INTRAVENOUS at 02:09

## 2021-09-25 RX ADMIN — Medication 10 ML: at 06:09

## 2021-09-25 RX ADMIN — FAMOTIDINE 20 MG: 20 TABLET ORAL at 08:09

## 2021-09-25 RX ADMIN — ENOXAPARIN SODIUM 40 MG: 100 INJECTION SUBCUTANEOUS at 04:09

## 2021-09-25 RX ADMIN — ALLOPURINOL 100 MG: 100 TABLET ORAL at 09:09

## 2021-09-25 RX ADMIN — ASPIRIN 81 MG: 81 TABLET, COATED ORAL at 09:09

## 2021-09-25 RX ADMIN — ALLOPURINOL 100 MG: 100 TABLET ORAL at 08:09

## 2021-09-25 RX ADMIN — COLCHICINE 0.6 MG: 0.6 TABLET, FILM COATED ORAL at 12:09

## 2021-09-25 RX ADMIN — CLOPIDOGREL 75 MG: 75 TABLET, FILM COATED ORAL at 08:09

## 2021-09-25 RX ADMIN — METOPROLOL SUCCINATE 25 MG: 25 TABLET, EXTENDED RELEASE ORAL at 08:09

## 2021-09-25 RX ADMIN — AMIODARONE HYDROCHLORIDE 300 MG: 200 TABLET ORAL at 08:09

## 2021-09-25 RX ADMIN — OXYCODONE 5 MG: 5 TABLET ORAL at 04:09

## 2021-09-25 RX ADMIN — ATORVASTATIN CALCIUM 80 MG: 20 TABLET, FILM COATED ORAL at 09:09

## 2021-09-25 RX ADMIN — OXYCODONE 5 MG: 5 TABLET ORAL at 06:09

## 2021-09-26 LAB
ANION GAP SERPL CALC-SCNC: 13 MMOL/L (ref 8–16)
BASOPHILS # BLD AUTO: 0.03 K/UL (ref 0–0.2)
BASOPHILS NFR BLD: 0.5 % (ref 0–1.9)
BUN SERPL-MCNC: 11 MG/DL (ref 8–23)
CALCIUM SERPL-MCNC: 8.8 MG/DL (ref 8.7–10.5)
CHLORIDE SERPL-SCNC: 104 MMOL/L (ref 95–110)
CO2 SERPL-SCNC: 21 MMOL/L (ref 23–29)
CREAT SERPL-MCNC: 0.8 MG/DL (ref 0.5–1.4)
CRP SERPL-MCNC: 52.9 MG/L (ref 0–8.2)
DIFFERENTIAL METHOD: ABNORMAL
EOSINOPHIL # BLD AUTO: 0.1 K/UL (ref 0–0.5)
EOSINOPHIL NFR BLD: 0.9 % (ref 0–8)
ERYTHROCYTE [DISTWIDTH] IN BLOOD BY AUTOMATED COUNT: 13.8 % (ref 11.5–14.5)
EST. GFR  (AFRICAN AMERICAN): >60 ML/MIN/1.73 M^2
EST. GFR  (NON AFRICAN AMERICAN): >60 ML/MIN/1.73 M^2
GLUCOSE SERPL-MCNC: 90 MG/DL (ref 70–110)
HCT VFR BLD AUTO: 41.5 % (ref 40–54)
HGB BLD-MCNC: 13.2 G/DL (ref 14–18)
IMM GRANULOCYTES # BLD AUTO: 0.09 K/UL (ref 0–0.04)
IMM GRANULOCYTES NFR BLD AUTO: 1.4 % (ref 0–0.5)
LYMPHOCYTES # BLD AUTO: 1.4 K/UL (ref 1–4.8)
LYMPHOCYTES NFR BLD: 21.1 % (ref 18–48)
MAGNESIUM SERPL-MCNC: 1.8 MG/DL (ref 1.6–2.6)
MCH RBC QN AUTO: 30.1 PG (ref 27–31)
MCHC RBC AUTO-ENTMCNC: 31.8 G/DL (ref 32–36)
MCV RBC AUTO: 95 FL (ref 82–98)
MONOCYTES # BLD AUTO: 0.5 K/UL (ref 0.3–1)
MONOCYTES NFR BLD: 8.2 % (ref 4–15)
NEUTROPHILS # BLD AUTO: 4.5 K/UL (ref 1.8–7.7)
NEUTROPHILS NFR BLD: 67.9 % (ref 38–73)
NRBC BLD-RTO: 0 /100 WBC
PHOSPHATE SERPL-MCNC: 3.2 MG/DL (ref 2.7–4.5)
PLATELET # BLD AUTO: 237 K/UL (ref 150–450)
PMV BLD AUTO: 10.5 FL (ref 9.2–12.9)
POCT GLUCOSE: 105 MG/DL (ref 70–110)
POCT GLUCOSE: 181 MG/DL (ref 70–110)
POCT GLUCOSE: 92 MG/DL (ref 70–110)
POCT GLUCOSE: 95 MG/DL (ref 70–110)
POTASSIUM SERPL-SCNC: 3.9 MMOL/L (ref 3.5–5.1)
RBC # BLD AUTO: 4.38 M/UL (ref 4.6–6.2)
SODIUM SERPL-SCNC: 138 MMOL/L (ref 136–145)
WBC # BLD AUTO: 6.59 K/UL (ref 3.9–12.7)

## 2021-09-26 PROCEDURE — A4216 STERILE WATER/SALINE, 10 ML: HCPCS | Performed by: INTERNAL MEDICINE

## 2021-09-26 PROCEDURE — 25000003 PHARM REV CODE 250: Performed by: PHYSICIAN ASSISTANT

## 2021-09-26 PROCEDURE — 86140 C-REACTIVE PROTEIN: CPT | Performed by: STUDENT IN AN ORGANIZED HEALTH CARE EDUCATION/TRAINING PROGRAM

## 2021-09-26 PROCEDURE — 63600175 PHARM REV CODE 636 W HCPCS: Performed by: PHYSICIAN ASSISTANT

## 2021-09-26 PROCEDURE — A4216 STERILE WATER/SALINE, 10 ML: HCPCS | Performed by: PHYSICIAN ASSISTANT

## 2021-09-26 PROCEDURE — 27000207 HC ISOLATION

## 2021-09-26 PROCEDURE — 11000001 HC ACUTE MED/SURG PRIVATE ROOM

## 2021-09-26 PROCEDURE — 99232 PR SUBSEQUENT HOSPITAL CARE,LEVL II: ICD-10-PCS | Mod: ,,, | Performed by: INTERNAL MEDICINE

## 2021-09-26 PROCEDURE — 83735 ASSAY OF MAGNESIUM: CPT | Performed by: PHYSICIAN ASSISTANT

## 2021-09-26 PROCEDURE — 84100 ASSAY OF PHOSPHORUS: CPT | Performed by: PHYSICIAN ASSISTANT

## 2021-09-26 PROCEDURE — 99232 SBSQ HOSP IP/OBS MODERATE 35: CPT | Mod: ,,, | Performed by: INTERNAL MEDICINE

## 2021-09-26 PROCEDURE — 63600175 PHARM REV CODE 636 W HCPCS: Performed by: HOSPITALIST

## 2021-09-26 PROCEDURE — 25000003 PHARM REV CODE 250: Performed by: HOSPITALIST

## 2021-09-26 PROCEDURE — 25000003 PHARM REV CODE 250: Performed by: INTERNAL MEDICINE

## 2021-09-26 PROCEDURE — 85025 COMPLETE CBC W/AUTO DIFF WBC: CPT | Performed by: PHYSICIAN ASSISTANT

## 2021-09-26 PROCEDURE — 80048 BASIC METABOLIC PNL TOTAL CA: CPT | Performed by: PHYSICIAN ASSISTANT

## 2021-09-26 PROCEDURE — 25000003 PHARM REV CODE 250: Performed by: NURSE PRACTITIONER

## 2021-09-26 PROCEDURE — 36415 COLL VENOUS BLD VENIPUNCTURE: CPT | Performed by: PHYSICIAN ASSISTANT

## 2021-09-26 RX ADMIN — Medication 10 ML: at 05:09

## 2021-09-26 RX ADMIN — ONDANSETRON 8 MG: 8 TABLET, ORALLY DISINTEGRATING ORAL at 09:09

## 2021-09-26 RX ADMIN — Medication 10 ML: at 12:09

## 2021-09-26 RX ADMIN — FAMOTIDINE 20 MG: 20 TABLET ORAL at 09:09

## 2021-09-26 RX ADMIN — ENOXAPARIN SODIUM 40 MG: 100 INJECTION SUBCUTANEOUS at 05:09

## 2021-09-26 RX ADMIN — ASPIRIN 81 MG: 81 TABLET, COATED ORAL at 09:09

## 2021-09-26 RX ADMIN — Medication 10 ML: at 06:09

## 2021-09-26 RX ADMIN — AMIODARONE HYDROCHLORIDE 300 MG: 200 TABLET ORAL at 10:09

## 2021-09-26 RX ADMIN — ATORVASTATIN CALCIUM 80 MG: 20 TABLET, FILM COATED ORAL at 09:09

## 2021-09-26 RX ADMIN — VANCOMYCIN HYDROCHLORIDE 1250 MG: 1.25 INJECTION, POWDER, LYOPHILIZED, FOR SOLUTION INTRAVENOUS at 02:09

## 2021-09-26 RX ADMIN — HYDROCODONE BITARTRATE AND ACETAMINOPHEN 1 TABLET: 5; 325 TABLET ORAL at 12:09

## 2021-09-26 RX ADMIN — ALLOPURINOL 100 MG: 100 TABLET ORAL at 09:09

## 2021-09-26 RX ADMIN — ACETAMINOPHEN 1000 MG: 500 TABLET ORAL at 09:09

## 2021-09-26 RX ADMIN — Medication 10 ML: at 02:09

## 2021-09-26 RX ADMIN — COLCHICINE 0.6 MG: 0.6 TABLET, FILM COATED ORAL at 09:09

## 2021-09-26 RX ADMIN — Medication 5 ML: at 02:09

## 2021-09-26 RX ADMIN — CLOPIDOGREL 75 MG: 75 TABLET, FILM COATED ORAL at 09:09

## 2021-09-26 RX ADMIN — METOPROLOL SUCCINATE 25 MG: 25 TABLET, EXTENDED RELEASE ORAL at 10:09

## 2021-09-26 RX ADMIN — VANCOMYCIN HYDROCHLORIDE 1250 MG: 1.25 INJECTION, POWDER, LYOPHILIZED, FOR SOLUTION INTRAVENOUS at 03:09

## 2021-09-26 RX ADMIN — SIMETHICONE 80 MG: 80 TABLET, CHEWABLE ORAL at 09:09

## 2021-09-26 RX ADMIN — Medication 10 ML: at 03:09

## 2021-09-27 ENCOUNTER — DOCUMENTATION ONLY (OUTPATIENT)
Dept: ORTHOPEDICS | Facility: CLINIC | Age: 71
End: 2021-09-27

## 2021-09-27 VITALS
OXYGEN SATURATION: 98 % | SYSTOLIC BLOOD PRESSURE: 138 MMHG | DIASTOLIC BLOOD PRESSURE: 65 MMHG | HEIGHT: 67 IN | BODY MASS INDEX: 35.63 KG/M2 | TEMPERATURE: 98 F | HEART RATE: 74 BPM | WEIGHT: 227 LBS | RESPIRATION RATE: 16 BRPM

## 2021-09-27 LAB
ANION GAP SERPL CALC-SCNC: 13 MMOL/L (ref 8–16)
BACTERIA SPEC ANAEROBE CULT: NORMAL
BASOPHILS # BLD AUTO: 0.04 K/UL (ref 0–0.2)
BASOPHILS NFR BLD: 0.5 % (ref 0–1.9)
BUN SERPL-MCNC: 9 MG/DL (ref 8–23)
CALCIUM SERPL-MCNC: 8.6 MG/DL (ref 8.7–10.5)
CHLORIDE SERPL-SCNC: 102 MMOL/L (ref 95–110)
CO2 SERPL-SCNC: 21 MMOL/L (ref 23–29)
CREAT SERPL-MCNC: 0.8 MG/DL (ref 0.5–1.4)
CRP SERPL-MCNC: 54.2 MG/L (ref 0–8.2)
DIFFERENTIAL METHOD: ABNORMAL
EOSINOPHIL # BLD AUTO: 0.2 K/UL (ref 0–0.5)
EOSINOPHIL NFR BLD: 1.8 % (ref 0–8)
ERYTHROCYTE [DISTWIDTH] IN BLOOD BY AUTOMATED COUNT: 13.7 % (ref 11.5–14.5)
EST. GFR  (AFRICAN AMERICAN): >60 ML/MIN/1.73 M^2
EST. GFR  (NON AFRICAN AMERICAN): >60 ML/MIN/1.73 M^2
GLUCOSE SERPL-MCNC: 101 MG/DL (ref 70–110)
HCT VFR BLD AUTO: 37.3 % (ref 40–54)
HGB BLD-MCNC: 12.2 G/DL (ref 14–18)
IMM GRANULOCYTES # BLD AUTO: 0.14 K/UL (ref 0–0.04)
IMM GRANULOCYTES NFR BLD AUTO: 1.7 % (ref 0–0.5)
LYMPHOCYTES # BLD AUTO: 1.6 K/UL (ref 1–4.8)
LYMPHOCYTES NFR BLD: 19.6 % (ref 18–48)
MAGNESIUM SERPL-MCNC: 1.9 MG/DL (ref 1.6–2.6)
MCH RBC QN AUTO: 30.7 PG (ref 27–31)
MCHC RBC AUTO-ENTMCNC: 32.7 G/DL (ref 32–36)
MCV RBC AUTO: 94 FL (ref 82–98)
MONOCYTES # BLD AUTO: 0.7 K/UL (ref 0.3–1)
MONOCYTES NFR BLD: 8.3 % (ref 4–15)
NEUTROPHILS # BLD AUTO: 5.6 K/UL (ref 1.8–7.7)
NEUTROPHILS NFR BLD: 68.1 % (ref 38–73)
NRBC BLD-RTO: 0 /100 WBC
PHOSPHATE SERPL-MCNC: 3.2 MG/DL (ref 2.7–4.5)
PLATELET # BLD AUTO: 261 K/UL (ref 150–450)
PMV BLD AUTO: 10.9 FL (ref 9.2–12.9)
POCT GLUCOSE: 120 MG/DL (ref 70–110)
POCT GLUCOSE: 91 MG/DL (ref 70–110)
POTASSIUM SERPL-SCNC: 3.6 MMOL/L (ref 3.5–5.1)
RBC # BLD AUTO: 3.98 M/UL (ref 4.6–6.2)
SODIUM SERPL-SCNC: 136 MMOL/L (ref 136–145)
WBC # BLD AUTO: 8.27 K/UL (ref 3.9–12.7)

## 2021-09-27 PROCEDURE — 99239 HOSP IP/OBS DSCHRG MGMT >30: CPT | Mod: ,,, | Performed by: INTERNAL MEDICINE

## 2021-09-27 PROCEDURE — A4216 STERILE WATER/SALINE, 10 ML: HCPCS | Performed by: INTERNAL MEDICINE

## 2021-09-27 PROCEDURE — 80048 BASIC METABOLIC PNL TOTAL CA: CPT | Performed by: PHYSICIAN ASSISTANT

## 2021-09-27 PROCEDURE — 25000003 PHARM REV CODE 250: Performed by: HOSPITALIST

## 2021-09-27 PROCEDURE — 83735 ASSAY OF MAGNESIUM: CPT | Performed by: PHYSICIAN ASSISTANT

## 2021-09-27 PROCEDURE — 25000003 PHARM REV CODE 250: Performed by: INTERNAL MEDICINE

## 2021-09-27 PROCEDURE — 84100 ASSAY OF PHOSPHORUS: CPT | Performed by: PHYSICIAN ASSISTANT

## 2021-09-27 PROCEDURE — 36415 COLL VENOUS BLD VENIPUNCTURE: CPT | Performed by: PHYSICIAN ASSISTANT

## 2021-09-27 PROCEDURE — 99239 PR HOSPITAL DISCHARGE DAY,>30 MIN: ICD-10-PCS | Mod: ,,, | Performed by: INTERNAL MEDICINE

## 2021-09-27 PROCEDURE — 25000003 PHARM REV CODE 250: Performed by: PHYSICIAN ASSISTANT

## 2021-09-27 PROCEDURE — 63600175 PHARM REV CODE 636 W HCPCS: Performed by: HOSPITALIST

## 2021-09-27 PROCEDURE — 86140 C-REACTIVE PROTEIN: CPT | Performed by: STUDENT IN AN ORGANIZED HEALTH CARE EDUCATION/TRAINING PROGRAM

## 2021-09-27 PROCEDURE — 85025 COMPLETE CBC W/AUTO DIFF WBC: CPT | Performed by: PHYSICIAN ASSISTANT

## 2021-09-27 RX ADMIN — COLCHICINE 0.6 MG: 0.6 TABLET, FILM COATED ORAL at 09:09

## 2021-09-27 RX ADMIN — VANCOMYCIN HYDROCHLORIDE 1250 MG: 1.25 INJECTION, POWDER, LYOPHILIZED, FOR SOLUTION INTRAVENOUS at 04:09

## 2021-09-27 RX ADMIN — ACETAMINOPHEN 1000 MG: 500 TABLET ORAL at 05:09

## 2021-09-27 RX ADMIN — Medication 10 ML: at 04:09

## 2021-09-27 RX ADMIN — CLOPIDOGREL 75 MG: 75 TABLET, FILM COATED ORAL at 09:09

## 2021-09-27 RX ADMIN — FAMOTIDINE 20 MG: 20 TABLET ORAL at 09:09

## 2021-09-27 RX ADMIN — OXYCODONE 5 MG: 5 TABLET ORAL at 05:09

## 2021-09-27 RX ADMIN — METOPROLOL SUCCINATE 25 MG: 25 TABLET, EXTENDED RELEASE ORAL at 09:09

## 2021-09-27 RX ADMIN — Medication 10 ML: at 05:09

## 2021-09-27 RX ADMIN — ALLOPURINOL 100 MG: 100 TABLET ORAL at 09:09

## 2021-09-27 RX ADMIN — AMIODARONE HYDROCHLORIDE 300 MG: 200 TABLET ORAL at 09:09

## 2021-09-28 ENCOUNTER — PATIENT OUTREACH (OUTPATIENT)
Dept: ADMINISTRATIVE | Facility: CLINIC | Age: 71
End: 2021-09-28

## 2021-09-30 PROCEDURE — G0180 PR HOME HEALTH MD CERTIFICATION: ICD-10-PCS | Mod: ,,, | Performed by: INTERNAL MEDICINE

## 2021-09-30 PROCEDURE — G0180 MD CERTIFICATION HHA PATIENT: HCPCS | Mod: ,,, | Performed by: INTERNAL MEDICINE

## 2021-10-04 ENCOUNTER — HOSPITAL ENCOUNTER (EMERGENCY)
Facility: HOSPITAL | Age: 71
Discharge: HOME | End: 2021-10-04
Attending: EMERGENCY MEDICINE
Payer: MEDICARE

## 2021-10-04 VITALS
RESPIRATION RATE: 19 BRPM | HEART RATE: 64 BPM | WEIGHT: 227 LBS | OXYGEN SATURATION: 97 % | DIASTOLIC BLOOD PRESSURE: 64 MMHG | TEMPERATURE: 98 F | BODY MASS INDEX: 35.55 KG/M2 | SYSTOLIC BLOOD PRESSURE: 138 MMHG

## 2021-10-04 DIAGNOSIS — I50.9 ACUTE ON CHRONIC CONGESTIVE HEART FAILURE, UNSPECIFIED HEART FAILURE TYPE: Primary | ICD-10-CM

## 2021-10-04 DIAGNOSIS — R06.02 SOB (SHORTNESS OF BREATH): ICD-10-CM

## 2021-10-04 DIAGNOSIS — R06.02 SHORTNESS OF BREATH: ICD-10-CM

## 2021-10-04 LAB
ALBUMIN SERPL BCP-MCNC: 3.1 G/DL (ref 3.5–5.2)
ALP SERPL-CCNC: 114 U/L (ref 55–135)
ALT SERPL W/O P-5'-P-CCNC: 23 U/L (ref 10–44)
ANION GAP SERPL CALC-SCNC: 15 MMOL/L (ref 8–16)
AST SERPL-CCNC: 24 U/L (ref 10–40)
BASOPHILS # BLD AUTO: 0.09 K/UL (ref 0–0.2)
BASOPHILS NFR BLD: 1 % (ref 0–1.9)
BILIRUB SERPL-MCNC: 0.4 MG/DL (ref 0.1–1)
BNP SERPL-MCNC: 516 PG/ML (ref 0–99)
BUN SERPL-MCNC: 19 MG/DL (ref 8–23)
CALCIUM SERPL-MCNC: 8.9 MG/DL (ref 8.7–10.5)
CHLORIDE SERPL-SCNC: 107 MMOL/L (ref 95–110)
CO2 SERPL-SCNC: 19 MMOL/L (ref 23–29)
CREAT SERPL-MCNC: 1.1 MG/DL (ref 0.5–1.4)
CTP QC/QA: YES
DIFFERENTIAL METHOD: ABNORMAL
EOSINOPHIL # BLD AUTO: 0.3 K/UL (ref 0–0.5)
EOSINOPHIL NFR BLD: 2.7 % (ref 0–8)
ERYTHROCYTE [DISTWIDTH] IN BLOOD BY AUTOMATED COUNT: 14.2 % (ref 11.5–14.5)
EST. GFR  (AFRICAN AMERICAN): >60 ML/MIN/1.73 M^2
EST. GFR  (NON AFRICAN AMERICAN): >60 ML/MIN/1.73 M^2
GLUCOSE SERPL-MCNC: 88 MG/DL (ref 70–110)
HCT VFR BLD AUTO: 39.8 % (ref 40–54)
HGB BLD-MCNC: 12.4 G/DL (ref 14–18)
IMM GRANULOCYTES # BLD AUTO: 0.08 K/UL (ref 0–0.04)
IMM GRANULOCYTES NFR BLD AUTO: 0.9 % (ref 0–0.5)
LYMPHOCYTES # BLD AUTO: 1.4 K/UL (ref 1–4.8)
LYMPHOCYTES NFR BLD: 15.3 % (ref 18–48)
MAGNESIUM SERPL-MCNC: 2 MG/DL (ref 1.6–2.6)
MCH RBC QN AUTO: 30.2 PG (ref 27–31)
MCHC RBC AUTO-ENTMCNC: 31.2 G/DL (ref 32–36)
MCV RBC AUTO: 97 FL (ref 82–98)
MONOCYTES # BLD AUTO: 0.6 K/UL (ref 0.3–1)
MONOCYTES NFR BLD: 6.8 % (ref 4–15)
NEUTROPHILS # BLD AUTO: 6.8 K/UL (ref 1.8–7.7)
NEUTROPHILS NFR BLD: 73.3 % (ref 38–73)
NRBC BLD-RTO: 0 /100 WBC
PLATELET # BLD AUTO: 384 K/UL (ref 150–450)
PMV BLD AUTO: 10.3 FL (ref 9.2–12.9)
POTASSIUM SERPL-SCNC: 4.5 MMOL/L (ref 3.5–5.1)
PROT SERPL-MCNC: 6.7 G/DL (ref 6–8.4)
RBC # BLD AUTO: 4.11 M/UL (ref 4.6–6.2)
SARS-COV-2 RDRP RESP QL NAA+PROBE: NEGATIVE
SODIUM SERPL-SCNC: 141 MMOL/L (ref 136–145)
TROPONIN I SERPL DL<=0.01 NG/ML-MCNC: 0.02 NG/ML (ref 0–0.03)
WBC # BLD AUTO: 9.22 K/UL (ref 3.9–12.7)

## 2021-10-04 PROCEDURE — 93010 ELECTROCARDIOGRAM REPORT: CPT | Mod: ,,, | Performed by: INTERNAL MEDICINE

## 2021-10-04 PROCEDURE — U0002 COVID-19 LAB TEST NON-CDC: HCPCS | Performed by: EMERGENCY MEDICINE

## 2021-10-04 PROCEDURE — 99285 PR EMERGENCY DEPT VISIT,LEVEL V: ICD-10-PCS | Mod: CR,CS,, | Performed by: PHYSICIAN ASSISTANT

## 2021-10-04 PROCEDURE — 84484 ASSAY OF TROPONIN QUANT: CPT | Performed by: PHYSICIAN ASSISTANT

## 2021-10-04 PROCEDURE — 83735 ASSAY OF MAGNESIUM: CPT | Performed by: PHYSICIAN ASSISTANT

## 2021-10-04 PROCEDURE — 93005 ELECTROCARDIOGRAM TRACING: CPT

## 2021-10-04 PROCEDURE — 99285 EMERGENCY DEPT VISIT HI MDM: CPT | Mod: CR,CS,, | Performed by: PHYSICIAN ASSISTANT

## 2021-10-04 PROCEDURE — 83880 ASSAY OF NATRIURETIC PEPTIDE: CPT | Performed by: PHYSICIAN ASSISTANT

## 2021-10-04 PROCEDURE — 80053 COMPREHEN METABOLIC PANEL: CPT | Performed by: PHYSICIAN ASSISTANT

## 2021-10-04 PROCEDURE — 93010 EKG 12-LEAD: ICD-10-PCS | Mod: ,,, | Performed by: INTERNAL MEDICINE

## 2021-10-04 PROCEDURE — 99285 EMERGENCY DEPT VISIT HI MDM: CPT | Mod: 25

## 2021-10-04 PROCEDURE — 85025 COMPLETE CBC W/AUTO DIFF WBC: CPT | Performed by: PHYSICIAN ASSISTANT

## 2021-10-04 PROCEDURE — 63600175 PHARM REV CODE 636 W HCPCS: Performed by: PHYSICIAN ASSISTANT

## 2021-10-04 PROCEDURE — 96374 THER/PROPH/DIAG INJ IV PUSH: CPT

## 2021-10-04 RX ORDER — FUROSEMIDE 10 MG/ML
80 INJECTION INTRAMUSCULAR; INTRAVENOUS
Status: COMPLETED | OUTPATIENT
Start: 2021-10-04 | End: 2021-10-04

## 2021-10-04 RX ADMIN — FUROSEMIDE 80 MG: 10 INJECTION INTRAMUSCULAR; INTRAVENOUS at 05:10

## 2021-10-05 ENCOUNTER — DOCUMENTATION ONLY (OUTPATIENT)
Dept: ORTHOPEDICS | Facility: CLINIC | Age: 71
End: 2021-10-05

## 2021-10-07 ENCOUNTER — EXTERNAL HOME HEALTH (OUTPATIENT)
Dept: HOME HEALTH SERVICES | Facility: HOSPITAL | Age: 71
End: 2021-10-07
Payer: MEDICARE

## 2021-10-11 ENCOUNTER — HOSPITAL ENCOUNTER (EMERGENCY)
Facility: HOSPITAL | Age: 71
Discharge: HOME | End: 2021-10-11
Attending: EMERGENCY MEDICINE
Payer: MEDICARE

## 2021-10-11 VITALS
WEIGHT: 227 LBS | DIASTOLIC BLOOD PRESSURE: 84 MMHG | BODY MASS INDEX: 35.63 KG/M2 | HEIGHT: 67 IN | RESPIRATION RATE: 16 BRPM | OXYGEN SATURATION: 100 % | SYSTOLIC BLOOD PRESSURE: 150 MMHG | HEART RATE: 86 BPM | TEMPERATURE: 99 F

## 2021-10-11 DIAGNOSIS — Z45.2 PICC (PERIPHERALLY INSERTED CENTRAL CATHETER) IN PLACE: Primary | ICD-10-CM

## 2021-10-11 DIAGNOSIS — T82.9XXA COMPLICATION ASSOCIATED WITH PERIPHERALLY INSERTED CENTRAL CATHETER (PICC), INITIAL ENCOUNTER: ICD-10-CM

## 2021-10-11 PROCEDURE — 99283 EMERGENCY DEPT VISIT LOW MDM: CPT | Mod: 25,ER

## 2021-10-12 ENCOUNTER — EXTERNAL HOME HEALTH (OUTPATIENT)
Dept: HOME HEALTH SERVICES | Facility: HOSPITAL | Age: 71
End: 2021-10-12
Payer: MEDICARE

## 2021-10-18 ENCOUNTER — OFFICE VISIT (OUTPATIENT)
Dept: ORTHOPEDICS | Facility: CLINIC | Age: 71
End: 2021-10-18
Payer: MEDICARE

## 2021-10-18 VITALS
BODY MASS INDEX: 35.63 KG/M2 | HEART RATE: 86 BPM | WEIGHT: 227 LBS | SYSTOLIC BLOOD PRESSURE: 125 MMHG | HEIGHT: 67 IN | DIASTOLIC BLOOD PRESSURE: 83 MMHG

## 2021-10-18 DIAGNOSIS — M70.21 OLECRANON BURSITIS OF RIGHT ELBOW: Primary | ICD-10-CM

## 2021-10-18 PROCEDURE — 1160F RVW MEDS BY RX/DR IN RCRD: CPT | Mod: CPTII,S$GLB,, | Performed by: PHYSICIAN ASSISTANT

## 2021-10-18 PROCEDURE — 1125F AMNT PAIN NOTED PAIN PRSNT: CPT | Mod: CPTII,S$GLB,, | Performed by: PHYSICIAN ASSISTANT

## 2021-10-18 PROCEDURE — 99214 PR OFFICE/OUTPT VISIT, EST, LEVL IV, 30-39 MIN: ICD-10-PCS | Mod: S$GLB,,, | Performed by: PHYSICIAN ASSISTANT

## 2021-10-18 PROCEDURE — 99999 PR PBB SHADOW E&M-EST. PATIENT-LVL III: ICD-10-PCS | Mod: PBBFAC,,, | Performed by: PHYSICIAN ASSISTANT

## 2021-10-18 PROCEDURE — 3074F PR MOST RECENT SYSTOLIC BLOOD PRESSURE < 130 MM HG: ICD-10-PCS | Mod: CPTII,S$GLB,, | Performed by: PHYSICIAN ASSISTANT

## 2021-10-18 PROCEDURE — 1125F PR PAIN SEVERITY QUANTIFIED, PAIN PRESENT: ICD-10-PCS | Mod: CPTII,S$GLB,, | Performed by: PHYSICIAN ASSISTANT

## 2021-10-18 PROCEDURE — 3288F PR FALLS RISK ASSESSMENT DOCUMENTED: ICD-10-PCS | Mod: CPTII,S$GLB,, | Performed by: PHYSICIAN ASSISTANT

## 2021-10-18 PROCEDURE — 3008F PR BODY MASS INDEX (BMI) DOCUMENTED: ICD-10-PCS | Mod: CPTII,S$GLB,, | Performed by: PHYSICIAN ASSISTANT

## 2021-10-18 PROCEDURE — 1160F PR REVIEW ALL MEDS BY PRESCRIBER/CLIN PHARMACIST DOCUMENTED: ICD-10-PCS | Mod: CPTII,S$GLB,, | Performed by: PHYSICIAN ASSISTANT

## 2021-10-18 PROCEDURE — 4010F ACE/ARB THERAPY RXD/TAKEN: CPT | Mod: CPTII,S$GLB,, | Performed by: PHYSICIAN ASSISTANT

## 2021-10-18 PROCEDURE — 1111F DSCHRG MED/CURRENT MED MERGE: CPT | Mod: CPTII,S$GLB,, | Performed by: PHYSICIAN ASSISTANT

## 2021-10-18 PROCEDURE — 4010F PR ACE/ARB THEARPY RXD/TAKEN: ICD-10-PCS | Mod: CPTII,S$GLB,, | Performed by: PHYSICIAN ASSISTANT

## 2021-10-18 PROCEDURE — 1159F PR MEDICATION LIST DOCUMENTED IN MEDICAL RECORD: ICD-10-PCS | Mod: CPTII,S$GLB,, | Performed by: PHYSICIAN ASSISTANT

## 2021-10-18 PROCEDURE — 3288F FALL RISK ASSESSMENT DOCD: CPT | Mod: CPTII,S$GLB,, | Performed by: PHYSICIAN ASSISTANT

## 2021-10-18 PROCEDURE — 3044F HG A1C LEVEL LT 7.0%: CPT | Mod: CPTII,S$GLB,, | Performed by: PHYSICIAN ASSISTANT

## 2021-10-18 PROCEDURE — 3008F BODY MASS INDEX DOCD: CPT | Mod: CPTII,S$GLB,, | Performed by: PHYSICIAN ASSISTANT

## 2021-10-18 PROCEDURE — 3074F SYST BP LT 130 MM HG: CPT | Mod: CPTII,S$GLB,, | Performed by: PHYSICIAN ASSISTANT

## 2021-10-18 PROCEDURE — 1101F PR PT FALLS ASSESS DOC 0-1 FALLS W/OUT INJ PAST YR: ICD-10-PCS | Mod: CPTII,S$GLB,, | Performed by: PHYSICIAN ASSISTANT

## 2021-10-18 PROCEDURE — 1111F PR DISCHARGE MEDS RECONCILED W/ CURRENT OUTPATIENT MED LIST: ICD-10-PCS | Mod: CPTII,S$GLB,, | Performed by: PHYSICIAN ASSISTANT

## 2021-10-18 PROCEDURE — 1101F PT FALLS ASSESS-DOCD LE1/YR: CPT | Mod: CPTII,S$GLB,, | Performed by: PHYSICIAN ASSISTANT

## 2021-10-18 PROCEDURE — 3044F PR MOST RECENT HEMOGLOBIN A1C LEVEL <7.0%: ICD-10-PCS | Mod: CPTII,S$GLB,, | Performed by: PHYSICIAN ASSISTANT

## 2021-10-18 PROCEDURE — 99999 PR PBB SHADOW E&M-EST. PATIENT-LVL III: CPT | Mod: PBBFAC,,, | Performed by: PHYSICIAN ASSISTANT

## 2021-10-18 PROCEDURE — 3079F PR MOST RECENT DIASTOLIC BLOOD PRESSURE 80-89 MM HG: ICD-10-PCS | Mod: CPTII,S$GLB,, | Performed by: PHYSICIAN ASSISTANT

## 2021-10-18 PROCEDURE — 1159F MED LIST DOCD IN RCRD: CPT | Mod: CPTII,S$GLB,, | Performed by: PHYSICIAN ASSISTANT

## 2021-10-18 PROCEDURE — 99214 OFFICE O/P EST MOD 30 MIN: CPT | Mod: S$GLB,,, | Performed by: PHYSICIAN ASSISTANT

## 2021-10-18 PROCEDURE — 3079F DIAST BP 80-89 MM HG: CPT | Mod: CPTII,S$GLB,, | Performed by: PHYSICIAN ASSISTANT

## 2021-10-19 ENCOUNTER — TELEPHONE (OUTPATIENT)
Dept: ELECTROPHYSIOLOGY | Facility: CLINIC | Age: 71
End: 2021-10-19

## 2021-10-19 ENCOUNTER — TELEPHONE (OUTPATIENT)
Dept: INFECTIOUS DISEASES | Facility: CLINIC | Age: 71
End: 2021-10-19

## 2021-10-19 DIAGNOSIS — A49.02 MRSA INFECTION: Primary | ICD-10-CM

## 2021-10-20 ENCOUNTER — INFUSION (OUTPATIENT)
Dept: INFECTIOUS DISEASES | Facility: HOSPITAL | Age: 71
End: 2021-10-20
Attending: INTERNAL MEDICINE
Payer: MEDICARE

## 2021-10-20 ENCOUNTER — OFFICE VISIT (OUTPATIENT)
Dept: INFECTIOUS DISEASES | Facility: CLINIC | Age: 71
End: 2021-10-20
Payer: MEDICARE

## 2021-10-20 ENCOUNTER — TELEPHONE (OUTPATIENT)
Dept: ORTHOPEDICS | Facility: CLINIC | Age: 71
End: 2021-10-20

## 2021-10-20 VITALS
HEART RATE: 72 BPM | TEMPERATURE: 98 F | SYSTOLIC BLOOD PRESSURE: 104 MMHG | HEIGHT: 67 IN | DIASTOLIC BLOOD PRESSURE: 63 MMHG | WEIGHT: 238.13 LBS | BODY MASS INDEX: 37.37 KG/M2

## 2021-10-20 DIAGNOSIS — A49.02 MRSA INFECTION: Primary | ICD-10-CM

## 2021-10-20 DIAGNOSIS — M70.21 OLECRANON BURSITIS OF RIGHT ELBOW: ICD-10-CM

## 2021-10-20 DIAGNOSIS — B37.9 CANDIDA ALBICANS INFECTION: ICD-10-CM

## 2021-10-20 PROCEDURE — 3074F PR MOST RECENT SYSTOLIC BLOOD PRESSURE < 130 MM HG: ICD-10-PCS | Mod: CPTII,S$GLB,, | Performed by: PHYSICIAN ASSISTANT

## 2021-10-20 PROCEDURE — 1101F PR PT FALLS ASSESS DOC 0-1 FALLS W/OUT INJ PAST YR: ICD-10-PCS | Mod: CPTII,S$GLB,, | Performed by: PHYSICIAN ASSISTANT

## 2021-10-20 PROCEDURE — 3288F FALL RISK ASSESSMENT DOCD: CPT | Mod: CPTII,S$GLB,, | Performed by: PHYSICIAN ASSISTANT

## 2021-10-20 PROCEDURE — 3008F BODY MASS INDEX DOCD: CPT | Mod: CPTII,S$GLB,, | Performed by: PHYSICIAN ASSISTANT

## 2021-10-20 PROCEDURE — 99999 PR PBB SHADOW E&M-EST. PATIENT-LVL III: ICD-10-PCS | Mod: PBBFAC,,, | Performed by: PHYSICIAN ASSISTANT

## 2021-10-20 PROCEDURE — 99214 PR OFFICE/OUTPT VISIT, EST, LEVL IV, 30-39 MIN: ICD-10-PCS | Mod: S$GLB,,, | Performed by: PHYSICIAN ASSISTANT

## 2021-10-20 PROCEDURE — 3044F HG A1C LEVEL LT 7.0%: CPT | Mod: CPTII,S$GLB,, | Performed by: PHYSICIAN ASSISTANT

## 2021-10-20 PROCEDURE — 1126F AMNT PAIN NOTED NONE PRSNT: CPT | Mod: CPTII,S$GLB,, | Performed by: PHYSICIAN ASSISTANT

## 2021-10-20 PROCEDURE — 1160F PR REVIEW ALL MEDS BY PRESCRIBER/CLIN PHARMACIST DOCUMENTED: ICD-10-PCS | Mod: CPTII,S$GLB,, | Performed by: PHYSICIAN ASSISTANT

## 2021-10-20 PROCEDURE — 99214 OFFICE O/P EST MOD 30 MIN: CPT | Mod: S$GLB,,, | Performed by: PHYSICIAN ASSISTANT

## 2021-10-20 PROCEDURE — 1101F PT FALLS ASSESS-DOCD LE1/YR: CPT | Mod: CPTII,S$GLB,, | Performed by: PHYSICIAN ASSISTANT

## 2021-10-20 PROCEDURE — 4010F ACE/ARB THERAPY RXD/TAKEN: CPT | Mod: CPTII,S$GLB,, | Performed by: PHYSICIAN ASSISTANT

## 2021-10-20 PROCEDURE — 99999 PR PBB SHADOW E&M-EST. PATIENT-LVL III: CPT | Mod: PBBFAC,,, | Performed by: PHYSICIAN ASSISTANT

## 2021-10-20 PROCEDURE — 3078F DIAST BP <80 MM HG: CPT | Mod: CPTII,S$GLB,, | Performed by: PHYSICIAN ASSISTANT

## 2021-10-20 PROCEDURE — 3044F PR MOST RECENT HEMOGLOBIN A1C LEVEL <7.0%: ICD-10-PCS | Mod: CPTII,S$GLB,, | Performed by: PHYSICIAN ASSISTANT

## 2021-10-20 PROCEDURE — 3074F SYST BP LT 130 MM HG: CPT | Mod: CPTII,S$GLB,, | Performed by: PHYSICIAN ASSISTANT

## 2021-10-20 PROCEDURE — 3008F PR BODY MASS INDEX (BMI) DOCUMENTED: ICD-10-PCS | Mod: CPTII,S$GLB,, | Performed by: PHYSICIAN ASSISTANT

## 2021-10-20 PROCEDURE — 3288F PR FALLS RISK ASSESSMENT DOCUMENTED: ICD-10-PCS | Mod: CPTII,S$GLB,, | Performed by: PHYSICIAN ASSISTANT

## 2021-10-20 PROCEDURE — 1111F DSCHRG MED/CURRENT MED MERGE: CPT | Mod: CPTII,S$GLB,, | Performed by: PHYSICIAN ASSISTANT

## 2021-10-20 PROCEDURE — 1159F PR MEDICATION LIST DOCUMENTED IN MEDICAL RECORD: ICD-10-PCS | Mod: CPTII,S$GLB,, | Performed by: PHYSICIAN ASSISTANT

## 2021-10-20 PROCEDURE — 1160F RVW MEDS BY RX/DR IN RCRD: CPT | Mod: CPTII,S$GLB,, | Performed by: PHYSICIAN ASSISTANT

## 2021-10-20 PROCEDURE — 1159F MED LIST DOCD IN RCRD: CPT | Mod: CPTII,S$GLB,, | Performed by: PHYSICIAN ASSISTANT

## 2021-10-20 PROCEDURE — 1111F PR DISCHARGE MEDS RECONCILED W/ CURRENT OUTPATIENT MED LIST: ICD-10-PCS | Mod: CPTII,S$GLB,, | Performed by: PHYSICIAN ASSISTANT

## 2021-10-20 PROCEDURE — 1126F PR PAIN SEVERITY QUANTIFIED, NO PAIN PRESENT: ICD-10-PCS | Mod: CPTII,S$GLB,, | Performed by: PHYSICIAN ASSISTANT

## 2021-10-20 PROCEDURE — 4010F PR ACE/ARB THEARPY RXD/TAKEN: ICD-10-PCS | Mod: CPTII,S$GLB,, | Performed by: PHYSICIAN ASSISTANT

## 2021-10-20 PROCEDURE — 3078F PR MOST RECENT DIASTOLIC BLOOD PRESSURE < 80 MM HG: ICD-10-PCS | Mod: CPTII,S$GLB,, | Performed by: PHYSICIAN ASSISTANT

## 2021-10-20 RX ORDER — FLUCONAZOLE 150 MG/1
150 TABLET ORAL DAILY
Qty: 3 TABLET | Refills: 0 | Status: SHIPPED | OUTPATIENT
Start: 2021-10-20 | End: 2021-10-23

## 2021-10-25 LAB — FUNGUS SPEC CULT: NORMAL

## 2021-10-27 ENCOUNTER — OFFICE VISIT (OUTPATIENT)
Dept: ORTHOPEDICS | Facility: CLINIC | Age: 71
End: 2021-10-27
Payer: MEDICARE

## 2021-10-27 VITALS
HEART RATE: 66 BPM | HEIGHT: 67 IN | SYSTOLIC BLOOD PRESSURE: 92 MMHG | DIASTOLIC BLOOD PRESSURE: 52 MMHG | WEIGHT: 238 LBS | BODY MASS INDEX: 37.35 KG/M2

## 2021-10-27 DIAGNOSIS — M70.21 OLECRANON BURSITIS OF RIGHT ELBOW: Primary | ICD-10-CM

## 2021-10-27 PROCEDURE — 3074F PR MOST RECENT SYSTOLIC BLOOD PRESSURE < 130 MM HG: ICD-10-PCS | Mod: CPTII,S$GLB,, | Performed by: PHYSICIAN ASSISTANT

## 2021-10-27 PROCEDURE — 1125F AMNT PAIN NOTED PAIN PRSNT: CPT | Mod: CPTII,S$GLB,, | Performed by: PHYSICIAN ASSISTANT

## 2021-10-27 PROCEDURE — 1160F RVW MEDS BY RX/DR IN RCRD: CPT | Mod: CPTII,S$GLB,, | Performed by: PHYSICIAN ASSISTANT

## 2021-10-27 PROCEDURE — 1159F MED LIST DOCD IN RCRD: CPT | Mod: CPTII,S$GLB,, | Performed by: PHYSICIAN ASSISTANT

## 2021-10-27 PROCEDURE — 3044F HG A1C LEVEL LT 7.0%: CPT | Mod: CPTII,S$GLB,, | Performed by: PHYSICIAN ASSISTANT

## 2021-10-27 PROCEDURE — 4010F ACE/ARB THERAPY RXD/TAKEN: CPT | Mod: CPTII,S$GLB,, | Performed by: PHYSICIAN ASSISTANT

## 2021-10-27 PROCEDURE — 1159F PR MEDICATION LIST DOCUMENTED IN MEDICAL RECORD: ICD-10-PCS | Mod: CPTII,S$GLB,, | Performed by: PHYSICIAN ASSISTANT

## 2021-10-27 PROCEDURE — 3078F PR MOST RECENT DIASTOLIC BLOOD PRESSURE < 80 MM HG: ICD-10-PCS | Mod: CPTII,S$GLB,, | Performed by: PHYSICIAN ASSISTANT

## 2021-10-27 PROCEDURE — 1111F DSCHRG MED/CURRENT MED MERGE: CPT | Mod: CPTII,S$GLB,, | Performed by: PHYSICIAN ASSISTANT

## 2021-10-27 PROCEDURE — 99213 OFFICE O/P EST LOW 20 MIN: CPT | Mod: S$GLB,,, | Performed by: PHYSICIAN ASSISTANT

## 2021-10-27 PROCEDURE — 3044F PR MOST RECENT HEMOGLOBIN A1C LEVEL <7.0%: ICD-10-PCS | Mod: CPTII,S$GLB,, | Performed by: PHYSICIAN ASSISTANT

## 2021-10-27 PROCEDURE — 99213 PR OFFICE/OUTPT VISIT, EST, LEVL III, 20-29 MIN: ICD-10-PCS | Mod: S$GLB,,, | Performed by: PHYSICIAN ASSISTANT

## 2021-10-27 PROCEDURE — 3008F PR BODY MASS INDEX (BMI) DOCUMENTED: ICD-10-PCS | Mod: CPTII,S$GLB,, | Performed by: PHYSICIAN ASSISTANT

## 2021-10-27 PROCEDURE — 1101F PT FALLS ASSESS-DOCD LE1/YR: CPT | Mod: CPTII,S$GLB,, | Performed by: PHYSICIAN ASSISTANT

## 2021-10-27 PROCEDURE — 4010F PR ACE/ARB THEARPY RXD/TAKEN: ICD-10-PCS | Mod: CPTII,S$GLB,, | Performed by: PHYSICIAN ASSISTANT

## 2021-10-27 PROCEDURE — 3008F BODY MASS INDEX DOCD: CPT | Mod: CPTII,S$GLB,, | Performed by: PHYSICIAN ASSISTANT

## 2021-10-27 PROCEDURE — 1111F PR DISCHARGE MEDS RECONCILED W/ CURRENT OUTPATIENT MED LIST: ICD-10-PCS | Mod: CPTII,S$GLB,, | Performed by: PHYSICIAN ASSISTANT

## 2021-10-27 PROCEDURE — 1160F PR REVIEW ALL MEDS BY PRESCRIBER/CLIN PHARMACIST DOCUMENTED: ICD-10-PCS | Mod: CPTII,S$GLB,, | Performed by: PHYSICIAN ASSISTANT

## 2021-10-27 PROCEDURE — 3074F SYST BP LT 130 MM HG: CPT | Mod: CPTII,S$GLB,, | Performed by: PHYSICIAN ASSISTANT

## 2021-10-27 PROCEDURE — 3078F DIAST BP <80 MM HG: CPT | Mod: CPTII,S$GLB,, | Performed by: PHYSICIAN ASSISTANT

## 2021-10-27 PROCEDURE — 3288F PR FALLS RISK ASSESSMENT DOCUMENTED: ICD-10-PCS | Mod: CPTII,S$GLB,, | Performed by: PHYSICIAN ASSISTANT

## 2021-10-27 PROCEDURE — 3288F FALL RISK ASSESSMENT DOCD: CPT | Mod: CPTII,S$GLB,, | Performed by: PHYSICIAN ASSISTANT

## 2021-10-27 PROCEDURE — 1101F PR PT FALLS ASSESS DOC 0-1 FALLS W/OUT INJ PAST YR: ICD-10-PCS | Mod: CPTII,S$GLB,, | Performed by: PHYSICIAN ASSISTANT

## 2021-10-27 PROCEDURE — 99999 PR PBB SHADOW E&M-EST. PATIENT-LVL III: ICD-10-PCS | Mod: PBBFAC,,, | Performed by: PHYSICIAN ASSISTANT

## 2021-10-27 PROCEDURE — 1125F PR PAIN SEVERITY QUANTIFIED, PAIN PRESENT: ICD-10-PCS | Mod: CPTII,S$GLB,, | Performed by: PHYSICIAN ASSISTANT

## 2021-10-27 PROCEDURE — 99999 PR PBB SHADOW E&M-EST. PATIENT-LVL III: CPT | Mod: PBBFAC,,, | Performed by: PHYSICIAN ASSISTANT

## 2021-12-12 PROBLEM — R10.9 RIGHT FLANK PAIN: Status: ACTIVE | Noted: 2021-01-01

## 2021-12-13 PROBLEM — M75.100 ROTATOR CUFF SYNDROME: Status: ACTIVE | Noted: 2021-01-01

## 2021-12-20 PROBLEM — M10.021 ACUTE IDIOPATHIC GOUT OF RIGHT ELBOW: Status: RESOLVED | Noted: 2021-09-23 | Resolved: 2021-01-01

## 2021-12-20 PROBLEM — G56.01 RIGHT CARPAL TUNNEL SYNDROME: Status: RESOLVED | Noted: 2018-04-06 | Resolved: 2021-01-01

## 2021-12-20 PROBLEM — M25.642 DECREASED RANGE OF MOTION OF FINGER OF LEFT HAND: Status: RESOLVED | Noted: 2021-02-02 | Resolved: 2021-01-01

## 2021-12-20 PROBLEM — R09.02 HYPOXIA: Status: RESOLVED | Noted: 2021-06-04 | Resolved: 2021-01-01

## 2021-12-20 PROBLEM — R22.31 LOCALIZED SWELLING OF RIGHT THUMB: Status: RESOLVED | Noted: 2020-06-02 | Resolved: 2021-01-01

## 2021-12-20 PROBLEM — M65.311 TRIGGER THUMB OF RIGHT HAND: Status: RESOLVED | Noted: 2018-04-06 | Resolved: 2021-01-01

## 2021-12-20 PROBLEM — R06.02 SHORTNESS OF BREATH: Status: RESOLVED | Noted: 2021-02-22 | Resolved: 2021-01-01

## 2021-12-20 PROBLEM — R29.818 FINE MOTOR IMPAIRMENT: Status: RESOLVED | Noted: 2021-02-02 | Resolved: 2021-01-01

## 2021-12-20 PROBLEM — M25.421 SWELLING OF RIGHT ELBOW: Status: RESOLVED | Noted: 2020-06-02 | Resolved: 2021-01-01

## 2021-12-20 PROBLEM — R29.898 DECREASED GRIP STRENGTH OF LEFT HAND: Status: RESOLVED | Noted: 2021-02-02 | Resolved: 2021-01-01

## 2021-12-20 PROBLEM — R79.89 ELEVATED TROPONIN: Chronic | Status: RESOLVED | Noted: 2018-06-15 | Resolved: 2021-01-01

## 2021-12-20 PROBLEM — M54.2 NECK PAIN, BILATERAL POSTERIOR: Status: RESOLVED | Noted: 2018-01-28 | Resolved: 2021-01-01

## 2021-12-20 PROBLEM — M70.21 OLECRANON BURSITIS OF RIGHT ELBOW: Status: RESOLVED | Noted: 2021-09-19 | Resolved: 2021-01-01

## 2021-12-20 PROBLEM — R10.9 RIGHT FLANK PAIN: Status: RESOLVED | Noted: 2021-01-01 | Resolved: 2021-01-01

## 2021-12-20 PROBLEM — R49.0 DYSPHONIA: Status: RESOLVED | Noted: 2018-01-28 | Resolved: 2021-01-01

## 2021-12-20 PROBLEM — R29.898 FINE MOTOR IMPAIRMENT: Status: RESOLVED | Noted: 2021-02-02 | Resolved: 2021-01-01

## 2021-12-20 PROBLEM — J18.0 BRONCHOPNEUMONIA: Status: ACTIVE | Noted: 2021-01-01

## 2022-01-01 ENCOUNTER — PATIENT MESSAGE (OUTPATIENT)
Dept: RESEARCH | Facility: CLINIC | Age: 72
End: 2022-01-01
Payer: MEDICARE

## 2022-01-01 ENCOUNTER — ANESTHESIA EVENT (OUTPATIENT)
Dept: INTENSIVE CARE | Facility: HOSPITAL | Age: 72
DRG: 260 | End: 2022-01-01
Payer: MEDICARE

## 2022-01-01 ENCOUNTER — TELEPHONE (OUTPATIENT)
Dept: TRANSPLANT | Facility: CLINIC | Age: 72
End: 2022-01-01
Payer: MEDICARE

## 2022-01-01 ENCOUNTER — TELEPHONE (OUTPATIENT)
Dept: CARDIOLOGY | Facility: CLINIC | Age: 72
End: 2022-01-01
Payer: MEDICARE

## 2022-01-01 ENCOUNTER — HOSPITAL ENCOUNTER (EMERGENCY)
Facility: HOSPITAL | Age: 72
End: 2022-10-28
Attending: EMERGENCY MEDICINE
Payer: MEDICARE

## 2022-01-01 ENCOUNTER — PATIENT MESSAGE (OUTPATIENT)
Dept: ADMINISTRATIVE | Facility: CLINIC | Age: 72
End: 2022-01-01
Payer: MEDICARE

## 2022-01-01 ENCOUNTER — HOSPITAL ENCOUNTER (EMERGENCY)
Facility: HOSPITAL | Age: 72
Discharge: HOME | End: 2022-06-25
Attending: EMERGENCY MEDICINE
Payer: MEDICARE

## 2022-01-01 ENCOUNTER — TELEPHONE (OUTPATIENT)
Dept: CARDIOTHORACIC SURGERY | Facility: CLINIC | Age: 72
End: 2022-01-01
Payer: MEDICARE

## 2022-01-01 ENCOUNTER — HOSPITAL ENCOUNTER (EMERGENCY)
Facility: HOSPITAL | Age: 72
Discharge: HOME | End: 2022-01-17
Attending: EMERGENCY MEDICINE
Payer: MEDICARE

## 2022-01-01 ENCOUNTER — PES CALL (OUTPATIENT)
Dept: ADMINISTRATIVE | Facility: CLINIC | Age: 72
End: 2022-01-01
Payer: MEDICARE

## 2022-01-01 ENCOUNTER — HOSPITAL ENCOUNTER (OUTPATIENT)
Dept: PULMONOLOGY | Facility: CLINIC | Age: 72
Discharge: HOME | End: 2022-05-18
Payer: MEDICARE

## 2022-01-01 ENCOUNTER — HOSPITAL ENCOUNTER (OUTPATIENT)
Facility: HOSPITAL | Age: 72
Discharge: HOME | End: 2022-08-08
Attending: STUDENT IN AN ORGANIZED HEALTH CARE EDUCATION/TRAINING PROGRAM | Admitting: INTERNAL MEDICINE
Payer: MEDICARE

## 2022-01-01 ENCOUNTER — TELEPHONE (OUTPATIENT)
Dept: PALLIATIVE MEDICINE | Facility: CLINIC | Age: 72
End: 2022-01-01
Payer: MEDICARE

## 2022-01-01 ENCOUNTER — HOSPITAL ENCOUNTER (INPATIENT)
Facility: HOSPITAL | Age: 72
LOS: 36 days | Discharge: HOME HEALTH | DRG: 260 | End: 2022-10-20
Attending: EMERGENCY MEDICINE | Admitting: INTERNAL MEDICINE
Payer: MEDICARE

## 2022-01-01 ENCOUNTER — TELEPHONE (OUTPATIENT)
Dept: CARDIOLOGY | Facility: HOSPITAL | Age: 72
End: 2022-01-01
Payer: MEDICARE

## 2022-01-01 ENCOUNTER — HOSPITAL ENCOUNTER (OUTPATIENT)
Dept: RADIOLOGY | Facility: HOSPITAL | Age: 72
Discharge: HOME | End: 2022-07-25
Attending: INTERNAL MEDICINE
Payer: MEDICARE

## 2022-01-01 ENCOUNTER — DOCUMENTATION ONLY (OUTPATIENT)
Dept: CARDIOLOGY | Facility: HOSPITAL | Age: 72
End: 2022-01-01
Payer: MEDICARE

## 2022-01-01 ENCOUNTER — LAB VISIT (OUTPATIENT)
Dept: LAB | Facility: HOSPITAL | Age: 72
End: 2022-01-01
Attending: INTERNAL MEDICINE
Payer: MEDICARE

## 2022-01-01 ENCOUNTER — NURSE TRIAGE (OUTPATIENT)
Dept: ADMINISTRATIVE | Facility: CLINIC | Age: 72
End: 2022-01-01
Payer: MEDICARE

## 2022-01-01 ENCOUNTER — PATIENT OUTREACH (OUTPATIENT)
Dept: ADMINISTRATIVE | Facility: CLINIC | Age: 72
End: 2022-01-01
Payer: MEDICARE

## 2022-01-01 ENCOUNTER — LAB VISIT (OUTPATIENT)
Dept: SURGERY | Facility: CLINIC | Age: 72
End: 2022-01-01
Payer: MEDICARE

## 2022-01-01 ENCOUNTER — ANESTHESIA (OUTPATIENT)
Dept: MEDSURG UNIT | Facility: HOSPITAL | Age: 72
DRG: 260 | End: 2022-01-01
Payer: MEDICARE

## 2022-01-01 ENCOUNTER — ANESTHESIA EVENT (OUTPATIENT)
Dept: INTERVENTIONAL RADIOLOGY/VASCULAR | Facility: HOSPITAL | Age: 72
DRG: 260 | End: 2022-01-01
Payer: MEDICARE

## 2022-01-01 ENCOUNTER — HOSPITAL ENCOUNTER (OUTPATIENT)
Dept: PULMONOLOGY | Facility: CLINIC | Age: 72
Discharge: HOME | End: 2022-06-16
Payer: MEDICARE

## 2022-01-01 ENCOUNTER — DOCUMENT SCAN (OUTPATIENT)
Dept: HOME HEALTH SERVICES | Facility: HOSPITAL | Age: 72
End: 2022-01-01
Payer: MEDICARE

## 2022-01-01 ENCOUNTER — DOCUMENTATION ONLY (OUTPATIENT)
Dept: TRANSPLANT | Facility: CLINIC | Age: 72
End: 2022-01-01
Payer: MEDICARE

## 2022-01-01 ENCOUNTER — PATIENT MESSAGE (OUTPATIENT)
Dept: TRANSPLANT | Facility: CLINIC | Age: 72
End: 2022-01-01
Payer: MEDICARE

## 2022-01-01 ENCOUNTER — ANESTHESIA (OUTPATIENT)
Dept: ENDOSCOPY | Facility: HOSPITAL | Age: 72
End: 2022-01-01
Payer: MEDICARE

## 2022-01-01 ENCOUNTER — HOSPITAL ENCOUNTER (EMERGENCY)
Facility: HOSPITAL | Age: 72
Discharge: HOME | End: 2022-05-22
Attending: EMERGENCY MEDICINE
Payer: MEDICARE

## 2022-01-01 ENCOUNTER — TELEPHONE (OUTPATIENT)
Dept: ENDOSCOPY | Facility: HOSPITAL | Age: 72
End: 2022-01-01
Payer: MEDICARE

## 2022-01-01 ENCOUNTER — HOSPITAL ENCOUNTER (OUTPATIENT)
Dept: RADIOLOGY | Facility: HOSPITAL | Age: 72
Discharge: HOME | End: 2022-06-16
Attending: INTERNAL MEDICINE
Payer: MEDICARE

## 2022-01-01 ENCOUNTER — PATIENT MESSAGE (OUTPATIENT)
Dept: INTERNAL MEDICINE | Facility: CLINIC | Age: 72
End: 2022-01-01
Payer: MEDICARE

## 2022-01-01 ENCOUNTER — CLINICAL SUPPORT (OUTPATIENT)
Dept: CARDIOLOGY | Facility: HOSPITAL | Age: 72
End: 2022-01-01
Payer: MEDICARE

## 2022-01-01 ENCOUNTER — HOSPITAL ENCOUNTER (OUTPATIENT)
Facility: HOSPITAL | Age: 72
Discharge: HOME | End: 2022-06-17
Attending: INTERNAL MEDICINE | Admitting: INTERNAL MEDICINE
Payer: MEDICARE

## 2022-01-01 ENCOUNTER — PATIENT MESSAGE (OUTPATIENT)
Dept: ELECTROPHYSIOLOGY | Facility: CLINIC | Age: 72
End: 2022-01-01
Payer: MEDICARE

## 2022-01-01 ENCOUNTER — OFFICE VISIT (OUTPATIENT)
Dept: TRANSPLANT | Facility: CLINIC | Age: 72
End: 2022-01-01
Attending: INTERNAL MEDICINE
Payer: MEDICARE

## 2022-01-01 ENCOUNTER — ANESTHESIA EVENT (OUTPATIENT)
Dept: MEDSURG UNIT | Facility: HOSPITAL | Age: 72
DRG: 260 | End: 2022-01-01
Payer: MEDICARE

## 2022-01-01 ENCOUNTER — LAB VISIT (OUTPATIENT)
Dept: LAB | Facility: HOSPITAL | Age: 72
End: 2022-01-01
Attending: NURSE PRACTITIONER
Payer: MEDICARE

## 2022-01-01 ENCOUNTER — OFFICE VISIT (OUTPATIENT)
Dept: PALLIATIVE MEDICINE | Facility: CLINIC | Age: 72
End: 2022-01-01
Payer: MEDICARE

## 2022-01-01 ENCOUNTER — HOSPITAL ENCOUNTER (INPATIENT)
Facility: HOSPITAL | Age: 72
LOS: 3 days | Discharge: HOME HEALTH | DRG: 872 | End: 2022-04-01
Attending: EMERGENCY MEDICINE | Admitting: HOSPITALIST
Payer: MEDICARE

## 2022-01-01 ENCOUNTER — HOSPITAL ENCOUNTER (INPATIENT)
Facility: HOSPITAL | Age: 72
LOS: 1 days | Discharge: HOME | DRG: 303 | End: 2022-06-23
Attending: EMERGENCY MEDICINE | Admitting: INTERNAL MEDICINE
Payer: MEDICARE

## 2022-01-01 ENCOUNTER — CONFERENCE (OUTPATIENT)
Dept: TRANSPLANT | Facility: CLINIC | Age: 72
End: 2022-01-01
Payer: MEDICARE

## 2022-01-01 ENCOUNTER — TELEPHONE (OUTPATIENT)
Dept: ELECTROPHYSIOLOGY | Facility: CLINIC | Age: 72
End: 2022-01-01
Payer: MEDICARE

## 2022-01-01 ENCOUNTER — ANESTHESIA (OUTPATIENT)
Dept: INTENSIVE CARE | Facility: HOSPITAL | Age: 72
DRG: 260 | End: 2022-01-01
Payer: MEDICARE

## 2022-01-01 ENCOUNTER — TELEPHONE (OUTPATIENT)
Dept: INTERNAL MEDICINE | Facility: CLINIC | Age: 72
End: 2022-01-01
Payer: MEDICARE

## 2022-01-01 ENCOUNTER — HOSPITAL ENCOUNTER (OUTPATIENT)
Dept: CARDIOLOGY | Facility: HOSPITAL | Age: 72
Discharge: HOME | End: 2022-05-09
Attending: INTERNAL MEDICINE
Payer: MEDICARE

## 2022-01-01 ENCOUNTER — HOSPITAL ENCOUNTER (EMERGENCY)
Facility: HOSPITAL | Age: 72
Discharge: HOME | End: 2022-04-25
Attending: EMERGENCY MEDICINE
Payer: MEDICARE

## 2022-01-01 ENCOUNTER — HOSPITAL ENCOUNTER (EMERGENCY)
Facility: HOSPITAL | Age: 72
Discharge: HOME | End: 2022-04-01
Attending: EMERGENCY MEDICINE
Payer: MEDICARE

## 2022-01-01 ENCOUNTER — CLINICAL SUPPORT (OUTPATIENT)
Dept: TRANSPLANT | Facility: CLINIC | Age: 72
End: 2022-01-01
Payer: MEDICARE

## 2022-01-01 ENCOUNTER — HOSPITAL ENCOUNTER (INPATIENT)
Facility: HOSPITAL | Age: 72
LOS: 2 days | Discharge: HOME HEALTH | DRG: 291 | End: 2022-04-08
Attending: EMERGENCY MEDICINE | Admitting: STUDENT IN AN ORGANIZED HEALTH CARE EDUCATION/TRAINING PROGRAM
Payer: MEDICARE

## 2022-01-01 ENCOUNTER — ANESTHESIA EVENT (OUTPATIENT)
Dept: ENDOSCOPY | Facility: HOSPITAL | Age: 72
End: 2022-01-01
Payer: MEDICARE

## 2022-01-01 ENCOUNTER — HOSPITAL ENCOUNTER (OUTPATIENT)
Dept: CARDIOLOGY | Facility: HOSPITAL | Age: 72
Discharge: HOME | End: 2022-06-16
Attending: INTERNAL MEDICINE
Payer: MEDICARE

## 2022-01-01 ENCOUNTER — ANESTHESIA EVENT (OUTPATIENT)
Dept: ENDOSCOPY | Facility: HOSPITAL | Age: 72
DRG: 260 | End: 2022-01-01
Payer: MEDICARE

## 2022-01-01 ENCOUNTER — EXTERNAL HOME HEALTH (OUTPATIENT)
Dept: HOME HEALTH SERVICES | Facility: HOSPITAL | Age: 72
End: 2022-01-01
Payer: MEDICARE

## 2022-01-01 ENCOUNTER — ANESTHESIA (OUTPATIENT)
Dept: INTENSIVE CARE | Facility: HOSPITAL | Age: 72
End: 2022-01-01

## 2022-01-01 ENCOUNTER — OFFICE VISIT (OUTPATIENT)
Dept: ELECTROPHYSIOLOGY | Facility: CLINIC | Age: 72
End: 2022-01-01
Payer: MEDICARE

## 2022-01-01 ENCOUNTER — HOSPITAL ENCOUNTER (OUTPATIENT)
Facility: HOSPITAL | Age: 72
LOS: 1 days | Discharge: HOME | End: 2022-05-29
Attending: EMERGENCY MEDICINE | Admitting: INTERNAL MEDICINE
Payer: MEDICARE

## 2022-01-01 ENCOUNTER — OFFICE VISIT (OUTPATIENT)
Dept: INTERNAL MEDICINE | Facility: CLINIC | Age: 72
End: 2022-01-01
Payer: MEDICARE

## 2022-01-01 ENCOUNTER — SOCIAL WORK (OUTPATIENT)
Dept: TRANSPLANT | Facility: CLINIC | Age: 72
End: 2022-01-01
Payer: MEDICARE

## 2022-01-01 ENCOUNTER — TELEPHONE (OUTPATIENT)
Dept: INFECTIOUS DISEASES | Facility: CLINIC | Age: 72
End: 2022-01-01
Payer: MEDICARE

## 2022-01-01 ENCOUNTER — NUTRITION (OUTPATIENT)
Dept: TRANSPLANT | Facility: CLINIC | Age: 72
End: 2022-01-01
Payer: MEDICARE

## 2022-01-01 ENCOUNTER — ANESTHESIA EVENT (OUTPATIENT)
Dept: INTENSIVE CARE | Facility: HOSPITAL | Age: 72
End: 2022-01-01

## 2022-01-01 ENCOUNTER — SOCIAL WORK (OUTPATIENT)
Dept: TRANSPLANT | Facility: CLINIC | Age: 72
End: 2022-01-01

## 2022-01-01 ENCOUNTER — PATIENT OUTREACH (OUTPATIENT)
Dept: ADMINISTRATIVE | Facility: OTHER | Age: 72
End: 2022-01-01
Payer: MEDICARE

## 2022-01-01 ENCOUNTER — PES CALL (OUTPATIENT)
Dept: ADMINISTRATIVE | Facility: OTHER | Age: 72
End: 2022-01-01
Payer: MEDICARE

## 2022-01-01 ENCOUNTER — ANESTHESIA (OUTPATIENT)
Dept: ENDOSCOPY | Facility: HOSPITAL | Age: 72
DRG: 260 | End: 2022-01-01
Payer: MEDICARE

## 2022-01-01 ENCOUNTER — PATIENT MESSAGE (OUTPATIENT)
Dept: CARDIOLOGY | Facility: CLINIC | Age: 72
End: 2022-01-01
Payer: MEDICARE

## 2022-01-01 ENCOUNTER — TELEPHONE (OUTPATIENT)
Dept: PHARMACY | Facility: CLINIC | Age: 72
End: 2022-01-01
Payer: MEDICARE

## 2022-01-01 ENCOUNTER — OFFICE VISIT (OUTPATIENT)
Dept: CARDIOTHORACIC SURGERY | Facility: CLINIC | Age: 72
End: 2022-01-01
Payer: MEDICARE

## 2022-01-01 ENCOUNTER — HOSPITAL ENCOUNTER (EMERGENCY)
Facility: HOSPITAL | Age: 72
Discharge: HOME | End: 2022-05-13
Attending: EMERGENCY MEDICINE
Payer: MEDICARE

## 2022-01-01 VITALS
SYSTOLIC BLOOD PRESSURE: 98 MMHG | OXYGEN SATURATION: 96 % | RESPIRATION RATE: 18 BRPM | TEMPERATURE: 98 F | OXYGEN SATURATION: 98 % | TEMPERATURE: 98 F | RESPIRATION RATE: 18 BRPM | HEART RATE: 80 BPM | BODY MASS INDEX: 32.28 KG/M2 | HEART RATE: 79 BPM | WEIGHT: 200 LBS | SYSTOLIC BLOOD PRESSURE: 122 MMHG | DIASTOLIC BLOOD PRESSURE: 53 MMHG | DIASTOLIC BLOOD PRESSURE: 54 MMHG

## 2022-01-01 VITALS
OXYGEN SATURATION: 95 % | WEIGHT: 221 LBS | DIASTOLIC BLOOD PRESSURE: 65 MMHG | SYSTOLIC BLOOD PRESSURE: 131 MMHG | RESPIRATION RATE: 18 BRPM | HEART RATE: 93 BPM | TEMPERATURE: 98 F | DIASTOLIC BLOOD PRESSURE: 61 MMHG | RESPIRATION RATE: 18 BRPM | HEART RATE: 92 BPM | OXYGEN SATURATION: 98 % | BODY MASS INDEX: 34.61 KG/M2 | SYSTOLIC BLOOD PRESSURE: 101 MMHG | TEMPERATURE: 98 F

## 2022-01-01 VITALS
HEIGHT: 67 IN | HEART RATE: 75 BPM | DIASTOLIC BLOOD PRESSURE: 63 MMHG | OXYGEN SATURATION: 99 % | WEIGHT: 216 LBS | TEMPERATURE: 98 F | SYSTOLIC BLOOD PRESSURE: 112 MMHG | RESPIRATION RATE: 17 BRPM | BODY MASS INDEX: 33.9 KG/M2

## 2022-01-01 VITALS
BODY MASS INDEX: 33.21 KG/M2 | TEMPERATURE: 98 F | OXYGEN SATURATION: 98 % | DIASTOLIC BLOOD PRESSURE: 60 MMHG | DIASTOLIC BLOOD PRESSURE: 58 MMHG | BODY MASS INDEX: 32.18 KG/M2 | SYSTOLIC BLOOD PRESSURE: 125 MMHG | WEIGHT: 211.63 LBS | HEART RATE: 65 BPM | HEART RATE: 88 BPM | HEIGHT: 67 IN | WEIGHT: 205 LBS | HEIGHT: 67 IN | SYSTOLIC BLOOD PRESSURE: 90 MMHG

## 2022-01-01 VITALS
TEMPERATURE: 98 F | OXYGEN SATURATION: 98 % | SYSTOLIC BLOOD PRESSURE: 138 MMHG | HEART RATE: 71 BPM | DIASTOLIC BLOOD PRESSURE: 79 MMHG | RESPIRATION RATE: 20 BRPM

## 2022-01-01 VITALS
TEMPERATURE: 98 F | HEART RATE: 116 BPM | OXYGEN SATURATION: 98 % | BODY MASS INDEX: 35.29 KG/M2 | DIASTOLIC BLOOD PRESSURE: 60 MMHG | HEIGHT: 67 IN | WEIGHT: 224.88 LBS | SYSTOLIC BLOOD PRESSURE: 130 MMHG | RESPIRATION RATE: 18 BRPM

## 2022-01-01 VITALS
DIASTOLIC BLOOD PRESSURE: 57 MMHG | SYSTOLIC BLOOD PRESSURE: 118 MMHG | HEIGHT: 66 IN | WEIGHT: 194 LBS | BODY MASS INDEX: 31.18 KG/M2 | HEART RATE: 88 BPM

## 2022-01-01 VITALS — HEART RATE: 77 BPM | SYSTOLIC BLOOD PRESSURE: 104 MMHG | DIASTOLIC BLOOD PRESSURE: 65 MMHG

## 2022-01-01 VITALS
HEIGHT: 67 IN | BODY MASS INDEX: 32.98 KG/M2 | SYSTOLIC BLOOD PRESSURE: 83 MMHG | WEIGHT: 210.13 LBS | DIASTOLIC BLOOD PRESSURE: 51 MMHG | HEART RATE: 95 BPM

## 2022-01-01 VITALS
DIASTOLIC BLOOD PRESSURE: 57 MMHG | TEMPERATURE: 97 F | BODY MASS INDEX: 33.43 KG/M2 | HEART RATE: 81 BPM | SYSTOLIC BLOOD PRESSURE: 119 MMHG | WEIGHT: 213 LBS | OXYGEN SATURATION: 95 % | HEIGHT: 67 IN | RESPIRATION RATE: 18 BRPM

## 2022-01-01 VITALS
OXYGEN SATURATION: 97 % | WEIGHT: 211 LBS | DIASTOLIC BLOOD PRESSURE: 53 MMHG | BODY MASS INDEX: 33.12 KG/M2 | RESPIRATION RATE: 18 BRPM | SYSTOLIC BLOOD PRESSURE: 89 MMHG | HEIGHT: 67 IN | HEART RATE: 92 BPM | TEMPERATURE: 98 F

## 2022-01-01 VITALS
OXYGEN SATURATION: 99 % | BODY MASS INDEX: 32.28 KG/M2 | DIASTOLIC BLOOD PRESSURE: 60 MMHG | WEIGHT: 205.69 LBS | HEIGHT: 67 IN | SYSTOLIC BLOOD PRESSURE: 80 MMHG | HEART RATE: 85 BPM

## 2022-01-01 VITALS
TEMPERATURE: 99 F | HEART RATE: 86 BPM | DIASTOLIC BLOOD PRESSURE: 56 MMHG | SYSTOLIC BLOOD PRESSURE: 82 MMHG | WEIGHT: 200.19 LBS | BODY MASS INDEX: 31.42 KG/M2 | OXYGEN SATURATION: 92 % | HEIGHT: 67 IN | RESPIRATION RATE: 20 BRPM

## 2022-01-01 VITALS
RESPIRATION RATE: 16 BRPM | SYSTOLIC BLOOD PRESSURE: 144 MMHG | BODY MASS INDEX: 33.89 KG/M2 | HEART RATE: 96 BPM | DIASTOLIC BLOOD PRESSURE: 65 MMHG | OXYGEN SATURATION: 97 % | WEIGHT: 215.94 LBS | TEMPERATURE: 98 F | HEIGHT: 67 IN

## 2022-01-01 VITALS
HEIGHT: 67 IN | WEIGHT: 212.06 LBS | TEMPERATURE: 98 F | RESPIRATION RATE: 18 BRPM | OXYGEN SATURATION: 96 % | SYSTOLIC BLOOD PRESSURE: 103 MMHG | BODY MASS INDEX: 33.28 KG/M2 | DIASTOLIC BLOOD PRESSURE: 56 MMHG | HEART RATE: 78 BPM

## 2022-01-01 VITALS
WEIGHT: 216.5 LBS | HEART RATE: 78 BPM | HEIGHT: 67 IN | SYSTOLIC BLOOD PRESSURE: 107 MMHG | DIASTOLIC BLOOD PRESSURE: 63 MMHG | BODY MASS INDEX: 33.98 KG/M2

## 2022-01-01 VITALS
BODY MASS INDEX: 33.67 KG/M2 | OXYGEN SATURATION: 96 % | DIASTOLIC BLOOD PRESSURE: 59 MMHG | SYSTOLIC BLOOD PRESSURE: 138 MMHG | RESPIRATION RATE: 18 BRPM | WEIGHT: 215 LBS | HEART RATE: 81 BPM

## 2022-01-01 VITALS
WEIGHT: 220 LBS | HEART RATE: 78 BPM | DIASTOLIC BLOOD PRESSURE: 50 MMHG | SYSTOLIC BLOOD PRESSURE: 97 MMHG | HEIGHT: 67 IN | BODY MASS INDEX: 34.53 KG/M2

## 2022-01-01 VITALS
HEART RATE: 80 BPM | WEIGHT: 214 LBS | HEIGHT: 67 IN | DIASTOLIC BLOOD PRESSURE: 64 MMHG | BODY MASS INDEX: 33.59 KG/M2 | SYSTOLIC BLOOD PRESSURE: 111 MMHG

## 2022-01-01 VITALS — WEIGHT: 192 LBS | BODY MASS INDEX: 30.07 KG/M2

## 2022-01-01 VITALS — BODY MASS INDEX: 33.91 KG/M2 | HEIGHT: 66 IN | WEIGHT: 211 LBS

## 2022-01-01 VITALS
BODY MASS INDEX: 30.13 KG/M2 | SYSTOLIC BLOOD PRESSURE: 96 MMHG | HEIGHT: 67 IN | HEART RATE: 99 BPM | DIASTOLIC BLOOD PRESSURE: 56 MMHG | WEIGHT: 192 LBS

## 2022-01-01 DIAGNOSIS — Z99.11 ENCOUNTER FOR WEANING FROM VENTILATOR: ICD-10-CM

## 2022-01-01 DIAGNOSIS — I50.23 ACUTE ON CHRONIC SYSTOLIC HEART FAILURE: Primary | ICD-10-CM

## 2022-01-01 DIAGNOSIS — Z86.79 H/O VENTRICULAR TACHYCARDIA: Chronic | ICD-10-CM

## 2022-01-01 DIAGNOSIS — I50.9 HEART FAILURE: ICD-10-CM

## 2022-01-01 DIAGNOSIS — E78.2 MIXED HYPERLIPIDEMIA: ICD-10-CM

## 2022-01-01 DIAGNOSIS — Z79.899 LONG TERM CURRENT USE OF AMIODARONE: ICD-10-CM

## 2022-01-01 DIAGNOSIS — I50.42 CHRONIC COMBINED SYSTOLIC AND DIASTOLIC CONGESTIVE HEART FAILURE: ICD-10-CM

## 2022-01-01 DIAGNOSIS — I25.5 CARDIOMYOPATHY, ISCHEMIC: Chronic | ICD-10-CM

## 2022-01-01 DIAGNOSIS — L30.8 DERMATITIS ASSOCIATED WITH MOISTURE: ICD-10-CM

## 2022-01-01 DIAGNOSIS — E66.9 OBESITY (BMI 30.0-34.9): ICD-10-CM

## 2022-01-01 DIAGNOSIS — R07.9 CHEST PAIN: ICD-10-CM

## 2022-01-01 DIAGNOSIS — L89.612 PRESSURE INJURY OF RIGHT HEEL, STAGE 2: ICD-10-CM

## 2022-01-01 DIAGNOSIS — R79.89 ELEVATED SERUM CREATININE: ICD-10-CM

## 2022-01-01 DIAGNOSIS — I50.42 CHRONIC COMBINED SYSTOLIC AND DIASTOLIC HEART FAILURE: Primary | ICD-10-CM

## 2022-01-01 DIAGNOSIS — I47.20 VENTRICULAR TACHYCARDIA: ICD-10-CM

## 2022-01-01 DIAGNOSIS — A41.9 SYSTEMIC INFECTION: Primary | ICD-10-CM

## 2022-01-01 DIAGNOSIS — I50.22 CHRONIC SYSTOLIC CONGESTIVE HEART FAILURE: Primary | ICD-10-CM

## 2022-01-01 DIAGNOSIS — Z95.810 PRESENCE OF AUTOMATIC (IMPLANTABLE) CARDIAC DEFIBRILLATOR: ICD-10-CM

## 2022-01-01 DIAGNOSIS — R53.1 WEAKNESS: ICD-10-CM

## 2022-01-01 DIAGNOSIS — J96.01 ACUTE HYPOXEMIC RESPIRATORY FAILURE: ICD-10-CM

## 2022-01-01 DIAGNOSIS — I82.409 DVT (DEEP VENOUS THROMBOSIS): ICD-10-CM

## 2022-01-01 DIAGNOSIS — R79.9 ELEVATED BUN: ICD-10-CM

## 2022-01-01 DIAGNOSIS — I49.8 OTHER SPECIFIED CARDIAC ARRHYTHMIAS: Primary | ICD-10-CM

## 2022-01-01 DIAGNOSIS — R55 SYNCOPE: ICD-10-CM

## 2022-01-01 DIAGNOSIS — I25.5 ISCHEMIC CARDIOMYOPATHY: ICD-10-CM

## 2022-01-01 DIAGNOSIS — I25.5 CARDIOMYOPATHY, ISCHEMIC: Primary | ICD-10-CM

## 2022-01-01 DIAGNOSIS — Z95.810 PRESENCE OF CARDIAC RESYNCHRONIZATION THERAPY DEFIBRILLATOR (CRT-D): ICD-10-CM

## 2022-01-01 DIAGNOSIS — Z76.82 PRE-HEART TRANSPLANT, LISTED: Primary | ICD-10-CM

## 2022-01-01 DIAGNOSIS — I49.9 ARRHYTHMIA: ICD-10-CM

## 2022-01-01 DIAGNOSIS — R55 POSTURAL DIZZINESS WITH PRESYNCOPE: Primary | ICD-10-CM

## 2022-01-01 DIAGNOSIS — Z12.11 SPECIAL SCREENING FOR MALIGNANT NEOPLASMS, COLON: Primary | ICD-10-CM

## 2022-01-01 DIAGNOSIS — Z01.818 PRE-OP TESTING: ICD-10-CM

## 2022-01-01 DIAGNOSIS — I70.0 THORACIC AORTIC ATHEROSCLEROSIS: ICD-10-CM

## 2022-01-01 DIAGNOSIS — M10.062 ACUTE IDIOPATHIC GOUT OF LEFT KNEE: ICD-10-CM

## 2022-01-01 DIAGNOSIS — A49.8 CLOSTRIDIUM DIFFICILE INFECTION: ICD-10-CM

## 2022-01-01 DIAGNOSIS — Z86.711 HX PULMONARY EMBOLISM: Chronic | ICD-10-CM

## 2022-01-01 DIAGNOSIS — R52 PAIN: ICD-10-CM

## 2022-01-01 DIAGNOSIS — M1A.9XX0 CHRONIC GOUT WITHOUT TOPHUS, UNSPECIFIED CAUSE, UNSPECIFIED SITE: ICD-10-CM

## 2022-01-01 DIAGNOSIS — R53.83 FATIGUE, UNSPECIFIED TYPE: Primary | ICD-10-CM

## 2022-01-01 DIAGNOSIS — R09.81 NASAL CONGESTION: ICD-10-CM

## 2022-01-01 DIAGNOSIS — Z01.818 PRE-OP TESTING: Primary | ICD-10-CM

## 2022-01-01 DIAGNOSIS — R73.03 PREDIABETES: ICD-10-CM

## 2022-01-01 DIAGNOSIS — I25.10 CORONARY ARTERY DISEASE INVOLVING NATIVE CORONARY ARTERY OF NATIVE HEART WITHOUT ANGINA PECTORIS: Chronic | ICD-10-CM

## 2022-01-01 DIAGNOSIS — N39.0 E. COLI UTI: ICD-10-CM

## 2022-01-01 DIAGNOSIS — I50.42 CHRONIC COMBINED SYSTOLIC AND DIASTOLIC CONGESTIVE HEART FAILURE: Primary | ICD-10-CM

## 2022-01-01 DIAGNOSIS — K86.9 PANCREATIC LESION: ICD-10-CM

## 2022-01-01 DIAGNOSIS — R11.0 NAUSEA: ICD-10-CM

## 2022-01-01 DIAGNOSIS — Z59.86 PATIENT CANNOT AFFORD MEDICATIONS: ICD-10-CM

## 2022-01-01 DIAGNOSIS — B95.62 MRSA BACTEREMIA: ICD-10-CM

## 2022-01-01 DIAGNOSIS — Z12.11 COLON CANCER SCREENING: Primary | ICD-10-CM

## 2022-01-01 DIAGNOSIS — I50.22 CHRONIC SYSTOLIC CONGESTIVE HEART FAILURE: ICD-10-CM

## 2022-01-01 DIAGNOSIS — I48.91 AF (ATRIAL FIBRILLATION): ICD-10-CM

## 2022-01-01 DIAGNOSIS — R19.7 DIARRHEA, UNSPECIFIED TYPE: ICD-10-CM

## 2022-01-01 DIAGNOSIS — Z20.822 LAB TEST NEGATIVE FOR COVID-19 VIRUS: Primary | ICD-10-CM

## 2022-01-01 DIAGNOSIS — R93.3 ABNORMAL DIGESTIVE SYSTEM DIAGNOSTIC IMAGING: ICD-10-CM

## 2022-01-01 DIAGNOSIS — I42.9 CARDIOMYOPATHY, UNSPECIFIED: ICD-10-CM

## 2022-01-01 DIAGNOSIS — Z51.5 QUALITY OF LIFE PALLIATIVE CARE ENCOUNTER: ICD-10-CM

## 2022-01-01 DIAGNOSIS — A41.9 SEVERE SEPSIS: ICD-10-CM

## 2022-01-01 DIAGNOSIS — N18.31 STAGE 3A CHRONIC KIDNEY DISEASE: ICD-10-CM

## 2022-01-01 DIAGNOSIS — I21.4 NSTEMI (NON-ST ELEVATED MYOCARDIAL INFARCTION): ICD-10-CM

## 2022-01-01 DIAGNOSIS — R06.02 SHORTNESS OF BREATH: ICD-10-CM

## 2022-01-01 DIAGNOSIS — I50.42 CHRONIC COMBINED SYSTOLIC (CONGESTIVE) AND DIASTOLIC (CONGESTIVE) HEART FAILURE: ICD-10-CM

## 2022-01-01 DIAGNOSIS — Z13.9 ENCOUNTER FOR MEDICAL SCREENING EXAMINATION: Primary | ICD-10-CM

## 2022-01-01 DIAGNOSIS — R57.8: ICD-10-CM

## 2022-01-01 DIAGNOSIS — L03.114 CELLULITIS OF LEFT FOREARM: ICD-10-CM

## 2022-01-01 DIAGNOSIS — I50.9 CHF (CONGESTIVE HEART FAILURE): ICD-10-CM

## 2022-01-01 DIAGNOSIS — I13.0 HYPERTENSIVE CARDIOVASCULAR-RENAL DISEASE, STAGE 1-4 OR UNSPECIFIED CHRONIC KIDNEY DISEASE, WITH HEART FAILURE: ICD-10-CM

## 2022-01-01 DIAGNOSIS — R57.9 SHOCK, UNSPECIFIED: Primary | ICD-10-CM

## 2022-01-01 DIAGNOSIS — Z86.718 HISTORY OF DVT (DEEP VEIN THROMBOSIS): Chronic | ICD-10-CM

## 2022-01-01 DIAGNOSIS — Z12.11 COLON CANCER SCREENING: ICD-10-CM

## 2022-01-01 DIAGNOSIS — Z45.1 ADJUSTMENT AND MANAGEMENT OF INFUSION PUMP: Primary | ICD-10-CM

## 2022-01-01 DIAGNOSIS — I50.23 ACUTE ON CHRONIC SYSTOLIC HEART FAILURE: ICD-10-CM

## 2022-01-01 DIAGNOSIS — I49.5 SSS (SICK SINUS SYNDROME): ICD-10-CM

## 2022-01-01 DIAGNOSIS — R11.10 VOMITING: ICD-10-CM

## 2022-01-01 DIAGNOSIS — R68.89 MULTIPLE COMPLAINTS: ICD-10-CM

## 2022-01-01 DIAGNOSIS — I25.5 CARDIOMYOPATHY, ISCHEMIC: Primary | Chronic | ICD-10-CM

## 2022-01-01 DIAGNOSIS — B96.20 E. COLI UTI: ICD-10-CM

## 2022-01-01 DIAGNOSIS — R78.81 MRSA BACTEREMIA: ICD-10-CM

## 2022-01-01 DIAGNOSIS — R23.8 SKIN BREAKDOWN: ICD-10-CM

## 2022-01-01 DIAGNOSIS — I95.89 OTHER SPECIFIED HYPOTENSION: Primary | ICD-10-CM

## 2022-01-01 DIAGNOSIS — K57.92 DIVERTICULITIS: ICD-10-CM

## 2022-01-01 DIAGNOSIS — Z76.82 PRE-HEART TRANSPLANT, LISTED: ICD-10-CM

## 2022-01-01 DIAGNOSIS — R09.02 HYPOXIA: ICD-10-CM

## 2022-01-01 DIAGNOSIS — I95.9 HYPOTENSION, UNSPECIFIED HYPOTENSION TYPE: Primary | ICD-10-CM

## 2022-01-01 DIAGNOSIS — I46.9 CARDIOPULMONARY ARREST: Primary | ICD-10-CM

## 2022-01-01 DIAGNOSIS — D49.0 IPMN (INTRADUCTAL PAPILLARY MUCINOUS NEOPLASM): ICD-10-CM

## 2022-01-01 DIAGNOSIS — M79.89 LEG SWELLING: ICD-10-CM

## 2022-01-01 DIAGNOSIS — I50.42 CHRONIC COMBINED SYSTOLIC AND DIASTOLIC HEART FAILURE: ICD-10-CM

## 2022-01-01 DIAGNOSIS — R06.02 SOB (SHORTNESS OF BREATH): ICD-10-CM

## 2022-01-01 DIAGNOSIS — A41.9 SEPSIS: ICD-10-CM

## 2022-01-01 DIAGNOSIS — R10.84 GENERALIZED ABDOMINAL PAIN: ICD-10-CM

## 2022-01-01 DIAGNOSIS — Z95.810 AICD (AUTOMATIC CARDIOVERTER/DEFIBRILLATOR) PRESENT: ICD-10-CM

## 2022-01-01 DIAGNOSIS — A49.02 MRSA INFECTION: Primary | ICD-10-CM

## 2022-01-01 DIAGNOSIS — I95.9 HYPOTENSION: ICD-10-CM

## 2022-01-01 DIAGNOSIS — I50.21 CHF (CONGESTIVE HEART FAILURE), NYHA CLASS II, ACUTE, SYSTOLIC: ICD-10-CM

## 2022-01-01 DIAGNOSIS — N12 PYELONEPHRITIS: Primary | ICD-10-CM

## 2022-01-01 DIAGNOSIS — R42 POSTURAL DIZZINESS WITH PRESYNCOPE: Primary | ICD-10-CM

## 2022-01-01 DIAGNOSIS — I82.622: ICD-10-CM

## 2022-01-01 DIAGNOSIS — R78.81 BACTEREMIA: ICD-10-CM

## 2022-01-01 DIAGNOSIS — R65.20 SEVERE SEPSIS: ICD-10-CM

## 2022-01-01 DIAGNOSIS — I50.43 ACUTE ON CHRONIC HEART FAILURE WITH REDUCED EJECTION FRACTION AND DIASTOLIC DYSFUNCTION: Primary | ICD-10-CM

## 2022-01-01 DIAGNOSIS — E66.01 SEVERE OBESITY (BMI 35.0-39.9) WITH COMORBIDITY: Chronic | ICD-10-CM

## 2022-01-01 DIAGNOSIS — I10 ESSENTIAL HYPERTENSION: Chronic | ICD-10-CM

## 2022-01-01 DIAGNOSIS — N17.9 AKI (ACUTE KIDNEY INJURY): ICD-10-CM

## 2022-01-01 DIAGNOSIS — I44.7 LBBB (LEFT BUNDLE BRANCH BLOCK): ICD-10-CM

## 2022-01-01 DIAGNOSIS — I50.43 ACUTE ON CHRONIC COMBINED SYSTOLIC AND DIASTOLIC HEART FAILURE: ICD-10-CM

## 2022-01-01 DIAGNOSIS — I95.9 HYPOTENSION: Primary | ICD-10-CM

## 2022-01-01 LAB
ABO + RH BLD: NORMAL
ADENOVIRUS: NOT DETECTED
ALBUMIN SERPL BCP-MCNC: 2.6 G/DL (ref 3.5–5.2)
ALBUMIN SERPL BCP-MCNC: 2.7 G/DL (ref 3.5–5.2)
ALBUMIN SERPL BCP-MCNC: 2.8 G/DL (ref 3.5–5.2)
ALBUMIN SERPL BCP-MCNC: 2.9 G/DL (ref 3.5–5.2)
ALBUMIN SERPL BCP-MCNC: 3 G/DL (ref 3.5–5.2)
ALBUMIN SERPL BCP-MCNC: 3 G/DL (ref 3.5–5.2)
ALBUMIN SERPL BCP-MCNC: 3.1 G/DL (ref 3.5–5.2)
ALBUMIN SERPL BCP-MCNC: 3.2 G/DL (ref 3.5–5.2)
ALBUMIN SERPL BCP-MCNC: 3.3 G/DL (ref 3.5–5.2)
ALBUMIN SERPL BCP-MCNC: 3.3 G/DL (ref 3.5–5.2)
ALBUMIN SERPL BCP-MCNC: 3.4 G/DL (ref 3.5–5.2)
ALBUMIN SERPL BCP-MCNC: 3.4 G/DL (ref 3.5–5.2)
ALBUMIN SERPL BCP-MCNC: 3.5 G/DL (ref 3.5–5.2)
ALBUMIN SERPL BCP-MCNC: 3.6 G/DL (ref 3.5–5.2)
ALBUMIN SERPL BCP-MCNC: 3.7 G/DL (ref 3.5–5.2)
ALBUMIN SERPL BCP-MCNC: 3.8 G/DL (ref 3.5–5.2)
ALBUMIN SERPL BCP-MCNC: 3.9 G/DL (ref 3.5–5.2)
ALBUMIN SERPL BCP-MCNC: 4 G/DL (ref 3.5–5.2)
ALBUMIN SERPL BCP-MCNC: 4 G/DL (ref 3.5–5.2)
ALBUMIN SERPL BCP-MCNC: 4.1 G/DL (ref 3.5–5.2)
ALBUMIN SERPL BCP-MCNC: 4.2 G/DL (ref 3.5–5.2)
ALLENS TEST: ABNORMAL
ALLENS TEST: NORMAL
ALP SERPL-CCNC: 101 U/L (ref 55–135)
ALP SERPL-CCNC: 44 U/L (ref 55–135)
ALP SERPL-CCNC: 49 U/L (ref 55–135)
ALP SERPL-CCNC: 51 U/L (ref 55–135)
ALP SERPL-CCNC: 53 U/L (ref 55–135)
ALP SERPL-CCNC: 63 U/L (ref 55–135)
ALP SERPL-CCNC: 63 U/L (ref 55–135)
ALP SERPL-CCNC: 67 U/L (ref 55–135)
ALP SERPL-CCNC: 67 U/L (ref 55–135)
ALP SERPL-CCNC: 69 U/L (ref 55–135)
ALP SERPL-CCNC: 69 U/L (ref 55–135)
ALP SERPL-CCNC: 70 U/L (ref 55–135)
ALP SERPL-CCNC: 70 U/L (ref 55–135)
ALP SERPL-CCNC: 71 U/L (ref 55–135)
ALP SERPL-CCNC: 72 U/L (ref 55–135)
ALP SERPL-CCNC: 72 U/L (ref 55–135)
ALP SERPL-CCNC: 73 U/L (ref 55–135)
ALP SERPL-CCNC: 74 U/L (ref 55–135)
ALP SERPL-CCNC: 75 U/L (ref 55–135)
ALP SERPL-CCNC: 75 U/L (ref 55–135)
ALP SERPL-CCNC: 76 U/L (ref 55–135)
ALP SERPL-CCNC: 77 U/L (ref 55–135)
ALP SERPL-CCNC: 78 U/L (ref 55–135)
ALP SERPL-CCNC: 79 U/L (ref 55–135)
ALP SERPL-CCNC: 80 U/L (ref 55–135)
ALP SERPL-CCNC: 81 U/L (ref 55–135)
ALP SERPL-CCNC: 82 U/L (ref 55–135)
ALP SERPL-CCNC: 83 U/L (ref 55–135)
ALP SERPL-CCNC: 84 U/L (ref 55–135)
ALP SERPL-CCNC: 86 U/L (ref 55–135)
ALP SERPL-CCNC: 88 U/L (ref 55–135)
ALP SERPL-CCNC: 91 U/L (ref 55–135)
ALP SERPL-CCNC: 91 U/L (ref 55–135)
ALP SERPL-CCNC: 93 U/L (ref 55–135)
ALP SERPL-CCNC: 98 U/L (ref 55–135)
ALT SERPL W/O P-5'-P-CCNC: 10 U/L (ref 10–44)
ALT SERPL W/O P-5'-P-CCNC: 11 U/L (ref 10–44)
ALT SERPL W/O P-5'-P-CCNC: 11 U/L (ref 10–44)
ALT SERPL W/O P-5'-P-CCNC: 12 U/L (ref 10–44)
ALT SERPL W/O P-5'-P-CCNC: 13 U/L (ref 10–44)
ALT SERPL W/O P-5'-P-CCNC: 14 U/L (ref 10–44)
ALT SERPL W/O P-5'-P-CCNC: 15 U/L (ref 10–44)
ALT SERPL W/O P-5'-P-CCNC: 16 U/L (ref 10–44)
ALT SERPL W/O P-5'-P-CCNC: 17 U/L (ref 10–44)
ALT SERPL W/O P-5'-P-CCNC: 18 U/L (ref 10–44)
ALT SERPL W/O P-5'-P-CCNC: 19 U/L (ref 10–44)
ALT SERPL W/O P-5'-P-CCNC: 20 U/L (ref 10–44)
ALT SERPL W/O P-5'-P-CCNC: 21 U/L (ref 10–44)
ALT SERPL W/O P-5'-P-CCNC: 22 U/L (ref 10–44)
ALT SERPL W/O P-5'-P-CCNC: 23 U/L (ref 10–44)
ALT SERPL W/O P-5'-P-CCNC: 23 U/L (ref 10–44)
ALT SERPL W/O P-5'-P-CCNC: 24 U/L (ref 10–44)
ALT SERPL W/O P-5'-P-CCNC: 24 U/L (ref 10–44)
ALT SERPL W/O P-5'-P-CCNC: 25 U/L (ref 10–44)
ALT SERPL W/O P-5'-P-CCNC: 27 U/L (ref 10–44)
ALT SERPL W/O P-5'-P-CCNC: 30 U/L (ref 10–44)
ALT SERPL W/O P-5'-P-CCNC: 5 U/L (ref 10–44)
ALT SERPL W/O P-5'-P-CCNC: 6 U/L (ref 10–44)
ALT SERPL W/O P-5'-P-CCNC: 7 U/L (ref 10–44)
ALT SERPL W/O P-5'-P-CCNC: 7 U/L (ref 10–44)
AMPHETAMINES SERPL QL: NEGATIVE
AMYLASE SERPL-CCNC: 72 U/L (ref 20–110)
AMYLASE, PANCREATIC FLUID: NORMAL U/L
ANION GAP SERPL CALC-SCNC: 10 MMOL/L (ref 8–16)
ANION GAP SERPL CALC-SCNC: 11 MMOL/L (ref 8–16)
ANION GAP SERPL CALC-SCNC: 12 MMOL/L (ref 8–16)
ANION GAP SERPL CALC-SCNC: 13 MMOL/L (ref 8–16)
ANION GAP SERPL CALC-SCNC: 14 MMOL/L (ref 8–16)
ANION GAP SERPL CALC-SCNC: 15 MMOL/L (ref 8–16)
ANION GAP SERPL CALC-SCNC: 16 MMOL/L (ref 8–16)
ANION GAP SERPL CALC-SCNC: 17 MMOL/L (ref 8–16)
ANION GAP SERPL CALC-SCNC: 17 MMOL/L (ref 8–16)
ANION GAP SERPL CALC-SCNC: 18 MMOL/L (ref 8–16)
ANION GAP SERPL CALC-SCNC: 18 MMOL/L (ref 8–16)
ANION GAP SERPL CALC-SCNC: 3 MMOL/L (ref 8–16)
ANION GAP SERPL CALC-SCNC: 6 MMOL/L (ref 8–16)
ANION GAP SERPL CALC-SCNC: 7 MMOL/L (ref 8–16)
ANION GAP SERPL CALC-SCNC: 8 MMOL/L (ref 8–16)
ANION GAP SERPL CALC-SCNC: 9 MMOL/L (ref 8–16)
APTT BLDCRRT: 26.5 SEC (ref 21–32)
APTT BLDCRRT: 27.1 SEC (ref 21–32)
APTT BLDCRRT: 28.1 SEC (ref 21–32)
APTT BLDCRRT: 28.5 SEC (ref 21–32)
APTT BLDCRRT: 28.7 SEC (ref 21–32)
APTT BLDCRRT: 28.8 SEC (ref 21–32)
APTT BLDCRRT: 31.6 SEC (ref 21–32)
APTT BLDCRRT: 35.2 SEC (ref 21–32)
APTT BLDCRRT: 35.6 SEC (ref 21–32)
APTT BLDCRRT: 36.8 SEC (ref 21–32)
APTT BLDCRRT: 38.4 SEC (ref 21–32)
APTT BLDCRRT: 39.4 SEC (ref 21–32)
APTT BLDCRRT: 40.2 SEC (ref 21–32)
APTT BLDCRRT: 40.2 SEC (ref 21–32)
APTT BLDCRRT: 40.8 SEC (ref 21–32)
APTT BLDCRRT: 41.6 SEC (ref 21–32)
APTT BLDCRRT: 41.6 SEC (ref 21–32)
APTT BLDCRRT: 41.9 SEC (ref 21–32)
APTT BLDCRRT: 41.9 SEC (ref 21–32)
APTT BLDCRRT: 42.4 SEC (ref 21–32)
APTT BLDCRRT: 43.3 SEC (ref 21–32)
APTT BLDCRRT: 44.3 SEC (ref 21–32)
APTT BLDCRRT: 44.7 SEC (ref 21–32)
APTT BLDCRRT: 45.8 SEC (ref 21–32)
APTT BLDCRRT: 46.7 SEC (ref 21–32)
APTT BLDCRRT: 47.5 SEC (ref 21–32)
APTT BLDCRRT: 49.4 SEC (ref 21–32)
APTT BLDCRRT: 49.8 SEC (ref 21–32)
APTT BLDCRRT: 50.5 SEC (ref 21–32)
APTT BLDCRRT: 50.7 SEC (ref 21–32)
APTT BLDCRRT: 50.7 SEC (ref 21–32)
APTT BLDCRRT: 53.6 SEC (ref 21–32)
APTT BLDCRRT: 54.7 SEC (ref 21–32)
APTT BLDCRRT: 55.9 SEC (ref 21–32)
APTT BLDCRRT: 56.8 SEC (ref 21–32)
APTT BLDCRRT: 57.6 SEC (ref 21–32)
APTT BLDCRRT: 65.1 SEC (ref 21–32)
APTT BLDCRRT: 65.8 SEC (ref 21–32)
APTT BLDCRRT: 70.1 SEC (ref 21–32)
APTT BLDCRRT: 76.6 SEC (ref 21–32)
APTT IMM NP PPP: NORMAL SEC (ref 32–48)
APTT P HEP NEUT PPP: NORMAL SEC (ref 32–48)
ASCENDING AORTA: 3.4 CM
ASCENDING AORTA: 3.51 CM
ASCENDING AORTA: 3.52 CM
ASCENDING AORTA: 3.6 CM
ASCENDING AORTA: 3.83 CM
AST SERPL-CCNC: 11 U/L (ref 10–40)
AST SERPL-CCNC: 12 U/L (ref 10–40)
AST SERPL-CCNC: 12 U/L (ref 10–40)
AST SERPL-CCNC: 13 U/L (ref 10–40)
AST SERPL-CCNC: 13 U/L (ref 10–40)
AST SERPL-CCNC: 14 U/L (ref 10–40)
AST SERPL-CCNC: 14 U/L (ref 10–40)
AST SERPL-CCNC: 15 U/L (ref 10–40)
AST SERPL-CCNC: 16 U/L (ref 10–40)
AST SERPL-CCNC: 17 U/L (ref 10–40)
AST SERPL-CCNC: 18 U/L (ref 10–40)
AST SERPL-CCNC: 19 U/L (ref 10–40)
AST SERPL-CCNC: 20 U/L (ref 10–40)
AST SERPL-CCNC: 21 U/L (ref 10–40)
AST SERPL-CCNC: 22 U/L (ref 10–40)
AST SERPL-CCNC: 23 U/L (ref 10–40)
AST SERPL-CCNC: 23 U/L (ref 10–40)
AST SERPL-CCNC: 26 U/L (ref 10–40)
AST SERPL-CCNC: 27 U/L (ref 10–40)
AST SERPL-CCNC: 29 U/L (ref 10–40)
AST SERPL-CCNC: 30 U/L (ref 10–40)
AST SERPL-CCNC: 36 U/L (ref 10–40)
AT III AG ACT/NOR PPP IA: 115 % (ref 80–120)
AV INDEX (PROSTH): 0.59
AV INDEX (PROSTH): 0.59
AV INDEX (PROSTH): 0.8
AV INDEX (PROSTH): 0.98
AV MEAN GRADIENT: 3 MMHG
AV MEAN GRADIENT: 5 MMHG
AV MEAN GRADIENT: 5 MMHG
AV MEAN GRADIENT: 6 MMHG
AV PEAK GRADIENT: 10 MMHG
AV PEAK GRADIENT: 7 MMHG
AV PEAK GRADIENT: 8 MMHG
AV PEAK GRADIENT: 9 MMHG
AV VALVE AREA: 1.94 CM2
AV VALVE AREA: 2.19 CM2
AV VALVE AREA: 3.12 CM2
AV VALVE AREA: 3.58 CM2
AV VELOCITY RATIO: 0.5
AV VELOCITY RATIO: 0.56
AV VELOCITY RATIO: 0.71
AV VELOCITY RATIO: 0.79
BACTERIA #/AREA URNS AUTO: ABNORMAL /HPF
BACTERIA #/AREA URNS AUTO: NORMAL /HPF
BACTERIA #/AREA URNS AUTO: NORMAL /HPF
BACTERIA BLD CULT: ABNORMAL
BACTERIA BLD CULT: NORMAL
BACTERIA SPEC AEROBE CULT: NO GROWTH
BACTERIA SPEC AEROBE CULT: NORMAL
BACTERIA SPEC AEROBE CULT: NORMAL
BACTERIA SPEC ANAEROBE CULT: NORMAL
BACTERIA UR CULT: ABNORMAL
BARBITURATES SERPL QL SCN: NEGATIVE
BASO STIPL BLD QL SMEAR: ABNORMAL
BASOPHILS # BLD AUTO: 0 K/UL (ref 0–0.2)
BASOPHILS # BLD AUTO: 0.01 K/UL (ref 0–0.2)
BASOPHILS # BLD AUTO: 0.02 K/UL (ref 0–0.2)
BASOPHILS # BLD AUTO: 0.03 K/UL (ref 0–0.2)
BASOPHILS # BLD AUTO: 0.04 K/UL (ref 0–0.2)
BASOPHILS # BLD AUTO: 0.05 K/UL (ref 0–0.2)
BASOPHILS # BLD AUTO: 0.06 K/UL (ref 0–0.2)
BASOPHILS # BLD AUTO: 0.07 K/UL (ref 0–0.2)
BASOPHILS # BLD AUTO: 0.11 K/UL (ref 0–0.2)
BASOPHILS NFR BLD: 0 % (ref 0–1.9)
BASOPHILS NFR BLD: 0.1 % (ref 0–1.9)
BASOPHILS NFR BLD: 0.1 % (ref 0–1.9)
BASOPHILS NFR BLD: 0.2 % (ref 0–1.9)
BASOPHILS NFR BLD: 0.3 % (ref 0–1.9)
BASOPHILS NFR BLD: 0.4 % (ref 0–1.9)
BASOPHILS NFR BLD: 0.5 % (ref 0–1.9)
BASOPHILS NFR BLD: 0.6 % (ref 0–1.9)
BASOPHILS NFR BLD: 0.7 % (ref 0–1.9)
BASOPHILS NFR BLD: 0.8 % (ref 0–1.9)
BASOPHILS NFR BLD: 0.9 % (ref 0–1.9)
BDY SITE: NORMAL
BDY SITE: NORMAL
BENZODIAZ SERPL QL SCN: NEGATIVE
BILIRUB DIRECT SERPL-MCNC: 0.2 MG/DL (ref 0.1–0.3)
BILIRUB DIRECT SERPL-MCNC: 0.2 MG/DL (ref 0.1–0.3)
BILIRUB DIRECT SERPL-MCNC: 0.3 MG/DL (ref 0.1–0.3)
BILIRUB DIRECT SERPL-MCNC: 0.4 MG/DL (ref 0.1–0.3)
BILIRUB SERPL-MCNC: 0.2 MG/DL (ref 0.1–1)
BILIRUB SERPL-MCNC: 0.2 MG/DL (ref 0.1–1)
BILIRUB SERPL-MCNC: 0.3 MG/DL (ref 0.1–1)
BILIRUB SERPL-MCNC: 0.4 MG/DL (ref 0.1–1)
BILIRUB SERPL-MCNC: 0.5 MG/DL (ref 0.1–1)
BILIRUB SERPL-MCNC: 0.6 MG/DL (ref 0.1–1)
BILIRUB SERPL-MCNC: 0.7 MG/DL (ref 0.1–1)
BILIRUB SERPL-MCNC: 0.8 MG/DL (ref 0.1–1)
BILIRUB SERPL-MCNC: 0.9 MG/DL (ref 0.1–1)
BILIRUB SERPL-MCNC: 0.9 MG/DL (ref 0.1–1)
BILIRUB UR QL STRIP: NEGATIVE
BLD GP AB SCN CELLS X3 SERPL QL: NORMAL
BLD PROD TYP BPU: NORMAL
BLOOD UNIT EXPIRATION DATE: NORMAL
BLOOD UNIT TYPE CODE: 5100
BLOOD UNIT TYPE: NORMAL
BNP SERPL-MCNC: 111 PG/ML (ref 0–99)
BNP SERPL-MCNC: 134 PG/ML (ref 0–99)
BNP SERPL-MCNC: 1396 PG/ML (ref 0–99)
BNP SERPL-MCNC: 140 PG/ML (ref 0–99)
BNP SERPL-MCNC: 153 PG/ML (ref 0–99)
BNP SERPL-MCNC: 158 PG/ML (ref 0–99)
BNP SERPL-MCNC: 165 PG/ML (ref 0–99)
BNP SERPL-MCNC: 177 PG/ML (ref 0–99)
BNP SERPL-MCNC: 179 PG/ML (ref 0–99)
BNP SERPL-MCNC: 184 PG/ML (ref 0–99)
BNP SERPL-MCNC: 190 PG/ML (ref 0–99)
BNP SERPL-MCNC: 203 PG/ML (ref 0–99)
BNP SERPL-MCNC: 206 PG/ML (ref 0–99)
BNP SERPL-MCNC: 208 PG/ML (ref 0–99)
BNP SERPL-MCNC: 218 PG/ML (ref 0–99)
BNP SERPL-MCNC: 231 PG/ML (ref 0–99)
BNP SERPL-MCNC: 233 PG/ML (ref 0–99)
BNP SERPL-MCNC: 237 PG/ML (ref 0–99)
BNP SERPL-MCNC: 245 PG/ML (ref 0–99)
BNP SERPL-MCNC: 334 PG/ML (ref 0–99)
BNP SERPL-MCNC: 335 PG/ML (ref 0–99)
BORDETELLA PARAPERTUSSIS (IS1001): NOT DETECTED
BORDETELLA PERTUSSIS (PTXP): NOT DETECTED
BSA FOR ECHO PROCEDURE: 2.1 M2
BSA FOR ECHO PROCEDURE: 2.11 M2
BSA FOR ECHO PROCEDURE: 2.14 M2
BSA FOR ECHO PROCEDURE: 2.14 M2
BSA FOR ECHO PROCEDURE: 2.15 M2
BSA FOR ECHO PROCEDURE: 2.15 M2
BUN SERPL-MCNC: 19 MG/DL (ref 8–23)
BUN SERPL-MCNC: 20 MG/DL (ref 8–23)
BUN SERPL-MCNC: 20 MG/DL (ref 8–23)
BUN SERPL-MCNC: 21 MG/DL (ref 8–23)
BUN SERPL-MCNC: 22 MG/DL (ref 8–23)
BUN SERPL-MCNC: 22 MG/DL (ref 8–23)
BUN SERPL-MCNC: 23 MG/DL (ref 8–23)
BUN SERPL-MCNC: 23 MG/DL (ref 8–23)
BUN SERPL-MCNC: 24 MG/DL (ref 8–23)
BUN SERPL-MCNC: 24 MG/DL (ref 8–23)
BUN SERPL-MCNC: 25 MG/DL (ref 8–23)
BUN SERPL-MCNC: 25 MG/DL (ref 8–23)
BUN SERPL-MCNC: 26 MG/DL (ref 8–23)
BUN SERPL-MCNC: 27 MG/DL (ref 8–23)
BUN SERPL-MCNC: 28 MG/DL (ref 8–23)
BUN SERPL-MCNC: 29 MG/DL (ref 8–23)
BUN SERPL-MCNC: 30 MG/DL (ref 8–23)
BUN SERPL-MCNC: 31 MG/DL (ref 8–23)
BUN SERPL-MCNC: 32 MG/DL (ref 8–23)
BUN SERPL-MCNC: 32 MG/DL (ref 8–23)
BUN SERPL-MCNC: 33 MG/DL (ref 8–23)
BUN SERPL-MCNC: 34 MG/DL (ref 8–23)
BUN SERPL-MCNC: 34 MG/DL (ref 8–23)
BUN SERPL-MCNC: 35 MG/DL (ref 8–23)
BUN SERPL-MCNC: 36 MG/DL (ref 6–30)
BUN SERPL-MCNC: 36 MG/DL (ref 8–23)
BUN SERPL-MCNC: 37 MG/DL (ref 6–30)
BUN SERPL-MCNC: 38 MG/DL (ref 8–23)
BUN SERPL-MCNC: 39 MG/DL (ref 8–23)
BUN SERPL-MCNC: 39 MG/DL (ref 8–23)
BUN SERPL-MCNC: 40 MG/DL (ref 8–23)
BUN SERPL-MCNC: 42 MG/DL (ref 8–23)
BUN SERPL-MCNC: 42 MG/DL (ref 8–23)
BUN SERPL-MCNC: 44 MG/DL (ref 8–23)
BUN SERPL-MCNC: 46 MG/DL (ref 8–23)
BUN SERPL-MCNC: 48 MG/DL (ref 8–23)
BUN SERPL-MCNC: 63 MG/DL (ref 6–30)
BZE SERPL QL: NEGATIVE
C DIFF GDH STL QL: POSITIVE
C DIFF TOX A+B STL QL IA: NEGATIVE
C DIFF TOX GENS STL QL NAA+PROBE: POSITIVE
CALCIUM SERPL-MCNC: 10.1 MG/DL (ref 8.7–10.5)
CALCIUM SERPL-MCNC: 6.7 MG/DL (ref 8.7–10.5)
CALCIUM SERPL-MCNC: 8.2 MG/DL (ref 8.7–10.5)
CALCIUM SERPL-MCNC: 8.3 MG/DL (ref 8.7–10.5)
CALCIUM SERPL-MCNC: 8.3 MG/DL (ref 8.7–10.5)
CALCIUM SERPL-MCNC: 8.4 MG/DL (ref 8.7–10.5)
CALCIUM SERPL-MCNC: 8.5 MG/DL (ref 8.7–10.5)
CALCIUM SERPL-MCNC: 8.6 MG/DL (ref 8.7–10.5)
CALCIUM SERPL-MCNC: 8.7 MG/DL (ref 8.7–10.5)
CALCIUM SERPL-MCNC: 8.8 MG/DL (ref 8.7–10.5)
CALCIUM SERPL-MCNC: 8.9 MG/DL (ref 8.7–10.5)
CALCIUM SERPL-MCNC: 9 MG/DL (ref 8.7–10.5)
CALCIUM SERPL-MCNC: 9.1 MG/DL (ref 8.7–10.5)
CALCIUM SERPL-MCNC: 9.2 MG/DL (ref 8.7–10.5)
CALCIUM SERPL-MCNC: 9.3 MG/DL (ref 8.7–10.5)
CALCIUM SERPL-MCNC: 9.4 MG/DL (ref 8.7–10.5)
CALCIUM SERPL-MCNC: 9.6 MG/DL (ref 8.7–10.5)
CALCIUM SERPL-MCNC: 9.7 MG/DL (ref 8.7–10.5)
CALCIUM SERPL-MCNC: 9.8 MG/DL (ref 8.7–10.5)
CALCIUM SERPL-MCNC: 9.9 MG/DL (ref 8.7–10.5)
CARBOXYTHC SERPL QL SCN: NEGATIVE
CEA FLD-MCNC: 170 NG/ML
CHLAMYDIA PNEUMONIAE: NOT DETECTED
CHLORIDE SERPL-SCNC: 100 MMOL/L (ref 95–110)
CHLORIDE SERPL-SCNC: 101 MMOL/L (ref 95–110)
CHLORIDE SERPL-SCNC: 102 MMOL/L (ref 95–110)
CHLORIDE SERPL-SCNC: 103 MMOL/L (ref 95–110)
CHLORIDE SERPL-SCNC: 104 MMOL/L (ref 95–110)
CHLORIDE SERPL-SCNC: 105 MMOL/L (ref 95–110)
CHLORIDE SERPL-SCNC: 106 MMOL/L (ref 95–110)
CHLORIDE SERPL-SCNC: 107 MMOL/L (ref 95–110)
CHLORIDE SERPL-SCNC: 108 MMOL/L (ref 95–110)
CHLORIDE SERPL-SCNC: 109 MMOL/L (ref 95–110)
CHLORIDE SERPL-SCNC: 111 MMOL/L (ref 95–110)
CHLORIDE SERPL-SCNC: 111 MMOL/L (ref 95–110)
CHLORIDE SERPL-SCNC: 113 MMOL/L (ref 95–110)
CHLORIDE SERPL-SCNC: 113 MMOL/L (ref 95–110)
CHLORIDE SERPL-SCNC: 84 MMOL/L (ref 95–110)
CHLORIDE SERPL-SCNC: 85 MMOL/L (ref 95–110)
CHLORIDE SERPL-SCNC: 89 MMOL/L (ref 95–110)
CHLORIDE SERPL-SCNC: 95 MMOL/L (ref 95–110)
CHLORIDE SERPL-SCNC: 95 MMOL/L (ref 95–110)
CHLORIDE SERPL-SCNC: 96 MMOL/L (ref 95–110)
CHLORIDE SERPL-SCNC: 97 MMOL/L (ref 95–110)
CHLORIDE SERPL-SCNC: 98 MMOL/L (ref 95–110)
CHLORIDE SERPL-SCNC: 99 MMOL/L (ref 95–110)
CHOLEST SERPL-MCNC: 145 MG/DL (ref 120–199)
CHOLEST/HDLC SERPL: 2.9 {RATIO} (ref 2–5)
CK SERPL-CCNC: 60 U/L (ref 20–200)
CLARITY UR REFRACT.AUTO: ABNORMAL
CLARITY UR REFRACT.AUTO: CLEAR
CO2 SERPL-SCNC: 17 MMOL/L (ref 23–29)
CO2 SERPL-SCNC: 18 MMOL/L (ref 23–29)
CO2 SERPL-SCNC: 19 MMOL/L (ref 23–29)
CO2 SERPL-SCNC: 20 MMOL/L (ref 23–29)
CO2 SERPL-SCNC: 21 MMOL/L (ref 23–29)
CO2 SERPL-SCNC: 22 MMOL/L (ref 23–29)
CO2 SERPL-SCNC: 23 MMOL/L (ref 23–29)
CO2 SERPL-SCNC: 24 MMOL/L (ref 23–29)
CO2 SERPL-SCNC: 25 MMOL/L (ref 23–29)
CO2 SERPL-SCNC: 26 MMOL/L (ref 23–29)
CO2 SERPL-SCNC: 27 MMOL/L (ref 23–29)
CO2 SERPL-SCNC: 28 MMOL/L (ref 23–29)
CO2 SERPL-SCNC: 28 MMOL/L (ref 23–29)
CO2 SERPL-SCNC: 29 MMOL/L (ref 23–29)
CO2 SERPL-SCNC: 29 MMOL/L (ref 23–29)
CO2 SERPL-SCNC: 30 MMOL/L (ref 23–29)
CO2 SERPL-SCNC: 31 MMOL/L (ref 23–29)
CO2 SERPL-SCNC: 33 MMOL/L (ref 23–29)
CODING SYSTEM: NORMAL
COLOR UR AUTO: COLORLESS
COLOR UR AUTO: YELLOW
COMPLEXED PSA SERPL-MCNC: 2.9 NG/ML (ref 0–4)
CONFIRM APTT STACLOT: NORMAL
CORONAVIRUS 229E, COMMON COLD VIRUS: NOT DETECTED
CORONAVIRUS HKU1, COMMON COLD VIRUS: NOT DETECTED
CORONAVIRUS NL63, COMMON COLD VIRUS: NOT DETECTED
CORONAVIRUS OC43, COMMON COLD VIRUS: NOT DETECTED
COTININE SERPL-MCNC: <3 NG/ML
CREAT SERPL-MCNC: 1 MG/DL (ref 0.5–1.4)
CREAT SERPL-MCNC: 1 MG/DL (ref 0.5–1.4)
CREAT SERPL-MCNC: 1.2 MG/DL (ref 0.5–1.4)
CREAT SERPL-MCNC: 1.3 MG/DL (ref 0.5–1.4)
CREAT SERPL-MCNC: 1.3 MG/DL (ref 0.5–1.4)
CREAT SERPL-MCNC: 1.4 MG/DL (ref 0.5–1.4)
CREAT SERPL-MCNC: 1.5 MG/DL (ref 0.5–1.4)
CREAT SERPL-MCNC: 1.6 MG/DL (ref 0.5–1.4)
CREAT SERPL-MCNC: 1.7 MG/DL (ref 0.5–1.4)
CREAT SERPL-MCNC: 1.8 MG/DL (ref 0.5–1.4)
CREAT SERPL-MCNC: 1.9 MG/DL (ref 0.5–1.4)
CREAT SERPL-MCNC: 2 MG/DL (ref 0.5–1.4)
CREAT SERPL-MCNC: 2.1 MG/DL (ref 0.5–1.4)
CREAT SERPL-MCNC: 2.2 MG/DL (ref 0.5–1.4)
CREAT SERPL-MCNC: 2.2 MG/DL (ref 0.5–1.4)
CREAT SERPL-MCNC: 2.3 MG/DL (ref 0.5–1.4)
CREAT SERPL-MCNC: 2.4 MG/DL (ref 0.5–1.4)
CREAT SERPL-MCNC: 2.5 MG/DL (ref 0.5–1.4)
CREAT SERPL-MCNC: 2.5 MG/DL (ref 0.5–1.4)
CREAT SERPL-MCNC: 2.6 MG/DL (ref 0.5–1.4)
CREAT SERPL-MCNC: 2.8 MG/DL (ref 0.5–1.4)
CREAT SERPL-MCNC: 3.1 MG/DL (ref 0.5–1.4)
CREAT UR-MCNC: 101 MG/DL (ref 23–375)
CRP SERPL-MCNC: 1 MG/L (ref 0–8.2)
CTP QC/QA: YES
CV ECHO LV RWT: 0.21 CM
CV ECHO LV RWT: 0.23 CM
CV ECHO LV RWT: 0.27 CM
CV ECHO LV RWT: 0.32 CM
CV STRESS BASE HR: 80 BPM
DELSYS: ABNORMAL
DELSYS: NORMAL
DELSYS: NORMAL
DIASTOLIC BLOOD PRESSURE: 64 MMHG
DIFFERENTIAL METHOD: ABNORMAL
DISPENSE STATUS: NORMAL
DOP CALC AO PEAK VEL: 1.33 M/S
DOP CALC AO PEAK VEL: 1.45 M/S
DOP CALC AO PEAK VEL: 1.51 M/S
DOP CALC AO PEAK VEL: 1.58 M/S
DOP CALC AO VTI: 19.88 CM
DOP CALC AO VTI: 21.44 CM
DOP CALC AO VTI: 22.87 CM
DOP CALC AO VTI: 27.82 CM
DOP CALC LVOT AREA: 3.2 CM2
DOP CALC LVOT AREA: 3.3 CM2
DOP CALC LVOT AREA: 3.7 CM2
DOP CALC LVOT AREA: 4.4 CM2
DOP CALC LVOT DIAMETER: 2.01 CM
DOP CALC LVOT DIAMETER: 2.05 CM
DOP CALC LVOT DIAMETER: 2.17 CM
DOP CALC LVOT DIAMETER: 2.38 CM
DOP CALC LVOT PEAK VEL: 0.79 M/S
DOP CALC LVOT PEAK VEL: 0.84 M/S
DOP CALC LVOT PEAK VEL: 0.95 M/S
DOP CALC LVOT PEAK VEL: 1.14 M/S
DOP CALC LVOT STROKE VOLUME: 50.09 CM3
DOP CALC LVOT STROKE VOLUME: 53.94 CM3
DOP CALC LVOT STROKE VOLUME: 66.95 CM3
DOP CALC LVOT STROKE VOLUME: 71.14 CM3
DOP CALCLVOT PEAK VEL VTI: 13.55 CM
DOP CALCLVOT PEAK VEL VTI: 16 CM
DOP CALCLVOT PEAK VEL VTI: 16.35 CM
DOP CALCLVOT PEAK VEL VTI: 21.11 CM
DRVVT SCREEN TO CONFIRM RATIO: NORMAL RATIO
E WAVE DECELERATION TIME: 112.61 MSEC
E WAVE DECELERATION TIME: 233.43 MSEC
E WAVE DECELERATION TIME: 91.35 MSEC
E/A RATIO: 0.47
E/A RATIO: 0.5
E/A RATIO: 0.54
E/E' RATIO: 4.11 M/S
E/E' RATIO: 5.5 M/S
E/E' RATIO: 8.17 M/S
ECHO LV POSTERIOR WALL: 0.8 CM (ref 0.6–1.1)
ECHO LV POSTERIOR WALL: 0.9 CM (ref 0.6–1.1)
ECHO LV POSTERIOR WALL: 0.95 CM (ref 0.6–1.1)
ECHO LV POSTERIOR WALL: 1.13 CM (ref 0.6–1.1)
EJECTION FRACTION: 10 %
EJECTION FRACTION: 12 %
EJECTION FRACTION: 15 %
EOSINOPHIL # BLD AUTO: 0 K/UL (ref 0–0.5)
EOSINOPHIL # BLD AUTO: 0.1 K/UL (ref 0–0.5)
EOSINOPHIL # BLD AUTO: 0.2 K/UL (ref 0–0.5)
EOSINOPHIL # BLD AUTO: 0.3 K/UL (ref 0–0.5)
EOSINOPHIL # BLD AUTO: 0.3 K/UL (ref 0–0.5)
EOSINOPHIL # BLD AUTO: 0.4 K/UL (ref 0–0.5)
EOSINOPHIL # BLD AUTO: 0.5 K/UL (ref 0–0.5)
EOSINOPHIL # BLD AUTO: 0.6 K/UL (ref 0–0.5)
EOSINOPHIL NFR BLD: 0 % (ref 0–8)
EOSINOPHIL NFR BLD: 0 % (ref 0–8)
EOSINOPHIL NFR BLD: 0.1 % (ref 0–8)
EOSINOPHIL NFR BLD: 0.2 % (ref 0–8)
EOSINOPHIL NFR BLD: 0.2 % (ref 0–8)
EOSINOPHIL NFR BLD: 0.3 % (ref 0–8)
EOSINOPHIL NFR BLD: 0.4 % (ref 0–8)
EOSINOPHIL NFR BLD: 0.4 % (ref 0–8)
EOSINOPHIL NFR BLD: 0.5 % (ref 0–8)
EOSINOPHIL NFR BLD: 0.9 % (ref 0–8)
EOSINOPHIL NFR BLD: 0.9 % (ref 0–8)
EOSINOPHIL NFR BLD: 1 % (ref 0–8)
EOSINOPHIL NFR BLD: 1.2 % (ref 0–8)
EOSINOPHIL NFR BLD: 1.3 % (ref 0–8)
EOSINOPHIL NFR BLD: 1.3 % (ref 0–8)
EOSINOPHIL NFR BLD: 1.4 % (ref 0–8)
EOSINOPHIL NFR BLD: 1.5 % (ref 0–8)
EOSINOPHIL NFR BLD: 1.6 % (ref 0–8)
EOSINOPHIL NFR BLD: 1.7 % (ref 0–8)
EOSINOPHIL NFR BLD: 1.8 % (ref 0–8)
EOSINOPHIL NFR BLD: 1.9 % (ref 0–8)
EOSINOPHIL NFR BLD: 2 % (ref 0–8)
EOSINOPHIL NFR BLD: 2.1 % (ref 0–8)
EOSINOPHIL NFR BLD: 2.2 % (ref 0–8)
EOSINOPHIL NFR BLD: 2.2 % (ref 0–8)
EOSINOPHIL NFR BLD: 2.3 % (ref 0–8)
EOSINOPHIL NFR BLD: 2.4 % (ref 0–8)
EOSINOPHIL NFR BLD: 2.4 % (ref 0–8)
EOSINOPHIL NFR BLD: 2.5 % (ref 0–8)
EOSINOPHIL NFR BLD: 2.6 % (ref 0–8)
EOSINOPHIL NFR BLD: 2.8 % (ref 0–8)
EOSINOPHIL NFR BLD: 2.9 % (ref 0–8)
EOSINOPHIL NFR BLD: 3 % (ref 0–8)
EOSINOPHIL NFR BLD: 3.2 % (ref 0–8)
EOSINOPHIL NFR BLD: 4 % (ref 0–8)
EOSINOPHIL NFR BLD: 4.1 % (ref 0–8)
EOSINOPHIL NFR BLD: 4.2 % (ref 0–8)
EOSINOPHIL NFR BLD: 4.9 % (ref 0–8)
EOSINOPHIL NFR BLD: 5.4 % (ref 0–8)
EOSINOPHIL NFR BLD: 6.9 % (ref 0–8)
EP: 6
ERYTHROCYTE [DISTWIDTH] IN BLOOD BY AUTOMATED COUNT: 14.2 % (ref 11.5–14.5)
ERYTHROCYTE [DISTWIDTH] IN BLOOD BY AUTOMATED COUNT: 14.7 % (ref 11.5–14.5)
ERYTHROCYTE [DISTWIDTH] IN BLOOD BY AUTOMATED COUNT: 14.9 % (ref 11.5–14.5)
ERYTHROCYTE [DISTWIDTH] IN BLOOD BY AUTOMATED COUNT: 15 % (ref 11.5–14.5)
ERYTHROCYTE [DISTWIDTH] IN BLOOD BY AUTOMATED COUNT: 15.1 % (ref 11.5–14.5)
ERYTHROCYTE [DISTWIDTH] IN BLOOD BY AUTOMATED COUNT: 15.2 % (ref 11.5–14.5)
ERYTHROCYTE [DISTWIDTH] IN BLOOD BY AUTOMATED COUNT: 15.3 % (ref 11.5–14.5)
ERYTHROCYTE [DISTWIDTH] IN BLOOD BY AUTOMATED COUNT: 15.3 % (ref 11.5–14.5)
ERYTHROCYTE [DISTWIDTH] IN BLOOD BY AUTOMATED COUNT: 15.4 % (ref 11.5–14.5)
ERYTHROCYTE [DISTWIDTH] IN BLOOD BY AUTOMATED COUNT: 15.5 % (ref 11.5–14.5)
ERYTHROCYTE [DISTWIDTH] IN BLOOD BY AUTOMATED COUNT: 15.6 % (ref 11.5–14.5)
ERYTHROCYTE [DISTWIDTH] IN BLOOD BY AUTOMATED COUNT: 15.6 % (ref 11.5–14.5)
ERYTHROCYTE [DISTWIDTH] IN BLOOD BY AUTOMATED COUNT: 15.7 % (ref 11.5–14.5)
ERYTHROCYTE [DISTWIDTH] IN BLOOD BY AUTOMATED COUNT: 15.8 % (ref 11.5–14.5)
ERYTHROCYTE [DISTWIDTH] IN BLOOD BY AUTOMATED COUNT: 15.9 % (ref 11.5–14.5)
ERYTHROCYTE [DISTWIDTH] IN BLOOD BY AUTOMATED COUNT: 16 % (ref 11.5–14.5)
ERYTHROCYTE [DISTWIDTH] IN BLOOD BY AUTOMATED COUNT: 16.1 % (ref 11.5–14.5)
ERYTHROCYTE [DISTWIDTH] IN BLOOD BY AUTOMATED COUNT: 16.1 % (ref 11.5–14.5)
ERYTHROCYTE [DISTWIDTH] IN BLOOD BY AUTOMATED COUNT: 16.2 % (ref 11.5–14.5)
ERYTHROCYTE [DISTWIDTH] IN BLOOD BY AUTOMATED COUNT: 16.3 % (ref 11.5–14.5)
ERYTHROCYTE [DISTWIDTH] IN BLOOD BY AUTOMATED COUNT: 16.4 % (ref 11.5–14.5)
ERYTHROCYTE [DISTWIDTH] IN BLOOD BY AUTOMATED COUNT: 16.6 % (ref 11.5–14.5)
ERYTHROCYTE [DISTWIDTH] IN BLOOD BY AUTOMATED COUNT: 16.7 % (ref 11.5–14.5)
ERYTHROCYTE [DISTWIDTH] IN BLOOD BY AUTOMATED COUNT: 16.7 % (ref 11.5–14.5)
ERYTHROCYTE [DISTWIDTH] IN BLOOD BY AUTOMATED COUNT: 16.8 % (ref 11.5–14.5)
ERYTHROCYTE [DISTWIDTH] IN BLOOD BY AUTOMATED COUNT: 16.8 % (ref 11.5–14.5)
ERYTHROCYTE [DISTWIDTH] IN BLOOD BY AUTOMATED COUNT: 16.9 % (ref 11.5–14.5)
ERYTHROCYTE [DISTWIDTH] IN BLOOD BY AUTOMATED COUNT: 16.9 % (ref 11.5–14.5)
ERYTHROCYTE [DISTWIDTH] IN BLOOD BY AUTOMATED COUNT: 17 % (ref 11.5–14.5)
ERYTHROCYTE [DISTWIDTH] IN BLOOD BY AUTOMATED COUNT: 17.1 % (ref 11.5–14.5)
ERYTHROCYTE [SEDIMENTATION RATE] IN BLOOD BY WESTERGREN METHOD: 20 MM/H
ERYTHROCYTE [SEDIMENTATION RATE] IN BLOOD BY WESTERGREN METHOD: 24 MM/H
ERYTHROCYTE [SEDIMENTATION RATE] IN BLOOD BY WESTERGREN METHOD: 34 MM/H
EST. GFR  (AFRICAN AMERICAN): 25.5 ML/MIN/1.73 M^2
EST. GFR  (AFRICAN AMERICAN): 29.2 ML/MIN/1.73 M^2
EST. GFR  (AFRICAN AMERICAN): 29.2 ML/MIN/1.73 M^2
EST. GFR  (AFRICAN AMERICAN): 30.7 ML/MIN/1.73 M^2
EST. GFR  (AFRICAN AMERICAN): 35.8 ML/MIN/1.73 M^2
EST. GFR  (AFRICAN AMERICAN): 38 ML/MIN/1.73 M^2
EST. GFR  (AFRICAN AMERICAN): 38 ML/MIN/1.73 M^2
EST. GFR  (AFRICAN AMERICAN): 38.2 ML/MIN/1.73 M^2
EST. GFR  (AFRICAN AMERICAN): 40.4 ML/MIN/1.73 M^2
EST. GFR  (AFRICAN AMERICAN): 40.4 ML/MIN/1.73 M^2
EST. GFR  (AFRICAN AMERICAN): 41 ML/MIN/1.73 M^2
EST. GFR  (AFRICAN AMERICAN): 43.1 ML/MIN/1.73 M^2
EST. GFR  (AFRICAN AMERICAN): 43.1 ML/MIN/1.73 M^2
EST. GFR  (AFRICAN AMERICAN): 46.2 ML/MIN/1.73 M^2
EST. GFR  (AFRICAN AMERICAN): 46.2 ML/MIN/1.73 M^2
EST. GFR  (AFRICAN AMERICAN): 46.5 ML/MIN/1.73 M^2
EST. GFR  (AFRICAN AMERICAN): 50.1 ML/MIN/1.73 M^2
EST. GFR  (AFRICAN AMERICAN): 53.8 ML/MIN/1.73 M^2
EST. GFR  (AFRICAN AMERICAN): 54.1 ML/MIN/1.73 M^2
EST. GFR  (AFRICAN AMERICAN): 58.8 ML/MIN/1.73 M^2
EST. GFR  (AFRICAN AMERICAN): >60 ML/MIN/1.73 M^2
EST. GFR  (NO RACE VARIABLE): 20.8 ML/MIN/1.73 M^2
EST. GFR  (NO RACE VARIABLE): 28.3 ML/MIN/1.73 M^2
EST. GFR  (NO RACE VARIABLE): 28.3 ML/MIN/1.73 M^2
EST. GFR  (NO RACE VARIABLE): 29.8 ML/MIN/1.73 M^2
EST. GFR  (NO RACE VARIABLE): 31.4 ML/MIN/1.73 M^2
EST. GFR  (NO RACE VARIABLE): 31.4 ML/MIN/1.73 M^2
EST. GFR  (NO RACE VARIABLE): 33.2 ML/MIN/1.73 M^2
EST. GFR  (NO RACE VARIABLE): 35.2 ML/MIN/1.73 M^2
EST. GFR  (NO RACE VARIABLE): 37.5 ML/MIN/1.73 M^2
EST. GFR  (NO RACE VARIABLE): 40 ML/MIN/1.73 M^2
EST. GFR  (NO RACE VARIABLE): 42.8 ML/MIN/1.73 M^2
EST. GFR  (NO RACE VARIABLE): 46.1 ML/MIN/1.73 M^2
EST. GFR  (NO RACE VARIABLE): 49.8 ML/MIN/1.73 M^2
EST. GFR  (NO RACE VARIABLE): 54.1 ML/MIN/1.73 M^2
EST. GFR  (NO RACE VARIABLE): 59.1 ML/MIN/1.73 M^2
EST. GFR  (NO RACE VARIABLE): >60 ML/MIN/1.73 M^2
EST. GFR  (NON AFRICAN AMERICAN): 22 ML/MIN/1.73 M^2
EST. GFR  (NON AFRICAN AMERICAN): 25.3 ML/MIN/1.73 M^2
EST. GFR  (NON AFRICAN AMERICAN): 25.3 ML/MIN/1.73 M^2
EST. GFR  (NON AFRICAN AMERICAN): 26.5 ML/MIN/1.73 M^2
EST. GFR  (NON AFRICAN AMERICAN): 31 ML/MIN/1.73 M^2
EST. GFR  (NON AFRICAN AMERICAN): 32.8 ML/MIN/1.73 M^2
EST. GFR  (NON AFRICAN AMERICAN): 32.8 ML/MIN/1.73 M^2
EST. GFR  (NON AFRICAN AMERICAN): 33.1 ML/MIN/1.73 M^2
EST. GFR  (NON AFRICAN AMERICAN): 34.9 ML/MIN/1.73 M^2
EST. GFR  (NON AFRICAN AMERICAN): 34.9 ML/MIN/1.73 M^2
EST. GFR  (NON AFRICAN AMERICAN): 35 ML/MIN/1.73 M^2
EST. GFR  (NON AFRICAN AMERICAN): 37.3 ML/MIN/1.73 M^2
EST. GFR  (NON AFRICAN AMERICAN): 37.3 ML/MIN/1.73 M^2
EST. GFR  (NON AFRICAN AMERICAN): 40 ML/MIN/1.73 M^2
EST. GFR  (NON AFRICAN AMERICAN): 40 ML/MIN/1.73 M^2
EST. GFR  (NON AFRICAN AMERICAN): 40.3 ML/MIN/1.73 M^2
EST. GFR  (NON AFRICAN AMERICAN): 43.3 ML/MIN/1.73 M^2
EST. GFR  (NON AFRICAN AMERICAN): 46.5 ML/MIN/1.73 M^2
EST. GFR  (NON AFRICAN AMERICAN): 46.8 ML/MIN/1.73 M^2
EST. GFR  (NON AFRICAN AMERICAN): 50.9 ML/MIN/1.73 M^2
EST. GFR  (NON AFRICAN AMERICAN): 55.7 ML/MIN/1.73 M^2
EST. GFR  (NON AFRICAN AMERICAN): >60 ML/MIN/1.73 M^2
ESTIMATED AVG GLUCOSE: 114 MG/DL (ref 68–131)
ESTIMATED AVG GLUCOSE: 117 MG/DL (ref 68–131)
ETHANOL SERPL QL SCN: NEGATIVE
F2 C.20210G>A GENO BLD/T: NEGATIVE
F5 P.R506Q BLD/T QL: ABNORMAL
FERRITIN SERPL-MCNC: 41 NG/ML (ref 20–300)
FERRITIN SERPL-MCNC: 53 NG/ML (ref 20–300)
FIBRINOGEN PPP-MCNC: 415 MG/DL (ref 182–400)
FINAL PATHOLOGIC DIAGNOSIS: NORMAL
FINAL PATHOLOGIC DIAGNOSIS: NORMAL
FIO2: 100
FIO2: 28
FIO2: 36
FIO2: 40
FIO2: 50
FIO2: 80
FIO2: 90
FLOW: 15
FLOW: 2
FLOW: 2.5
FLOW: 2.5
FLOW: 3
FLOW: 3.5
FLOW: 3.5
FLOW: 4
FLUBV RNA NPH QL NAA+NON-PROBE: NOT DETECTED
FOLATE SERPL-MCNC: 6.4 NG/ML (ref 4–24)
FRACTIONAL SHORTENING: 14 % (ref 28–44)
FRACTIONAL SHORTENING: 15 % (ref 28–44)
FRACTIONAL SHORTENING: 6 % (ref 28–44)
FRACTIONAL SHORTENING: 6 % (ref 28–44)
G6PD RBC-CCNT: 10.3 U/G HB (ref 8–11.9)
GAMMA INTERFERON BACKGROUND BLD IA-ACNC: 0.05 IU/ML
GLUCOSE SERPL-MCNC: 100 MG/DL (ref 70–110)
GLUCOSE SERPL-MCNC: 101 MG/DL (ref 70–110)
GLUCOSE SERPL-MCNC: 101 MG/DL (ref 70–110)
GLUCOSE SERPL-MCNC: 102 MG/DL (ref 70–110)
GLUCOSE SERPL-MCNC: 103 MG/DL (ref 70–110)
GLUCOSE SERPL-MCNC: 104 MG/DL (ref 70–110)
GLUCOSE SERPL-MCNC: 105 MG/DL (ref 70–110)
GLUCOSE SERPL-MCNC: 106 MG/DL (ref 70–110)
GLUCOSE SERPL-MCNC: 106 MG/DL (ref 70–110)
GLUCOSE SERPL-MCNC: 108 MG/DL (ref 70–110)
GLUCOSE SERPL-MCNC: 108 MG/DL (ref 70–110)
GLUCOSE SERPL-MCNC: 109 MG/DL (ref 70–110)
GLUCOSE SERPL-MCNC: 110 MG/DL (ref 70–110)
GLUCOSE SERPL-MCNC: 111 MG/DL (ref 70–110)
GLUCOSE SERPL-MCNC: 111 MG/DL (ref 70–110)
GLUCOSE SERPL-MCNC: 112 MG/DL (ref 70–110)
GLUCOSE SERPL-MCNC: 115 MG/DL (ref 70–110)
GLUCOSE SERPL-MCNC: 115 MG/DL (ref 70–110)
GLUCOSE SERPL-MCNC: 116 MG/DL (ref 70–110)
GLUCOSE SERPL-MCNC: 117 MG/DL (ref 70–110)
GLUCOSE SERPL-MCNC: 118 MG/DL (ref 70–110)
GLUCOSE SERPL-MCNC: 119 MG/DL (ref 70–110)
GLUCOSE SERPL-MCNC: 122 MG/DL (ref 70–110)
GLUCOSE SERPL-MCNC: 123 MG/DL (ref 70–110)
GLUCOSE SERPL-MCNC: 124 MG/DL (ref 70–110)
GLUCOSE SERPL-MCNC: 125 MG/DL (ref 70–110)
GLUCOSE SERPL-MCNC: 129 MG/DL (ref 70–110)
GLUCOSE SERPL-MCNC: 134 MG/DL (ref 70–110)
GLUCOSE SERPL-MCNC: 139 MG/DL (ref 70–110)
GLUCOSE SERPL-MCNC: 141 MG/DL (ref 70–110)
GLUCOSE SERPL-MCNC: 142 MG/DL (ref 70–110)
GLUCOSE SERPL-MCNC: 144 MG/DL (ref 70–110)
GLUCOSE SERPL-MCNC: 146 MG/DL (ref 70–110)
GLUCOSE SERPL-MCNC: 147 MG/DL (ref 70–110)
GLUCOSE SERPL-MCNC: 147 MG/DL (ref 70–110)
GLUCOSE SERPL-MCNC: 148 MG/DL (ref 70–110)
GLUCOSE SERPL-MCNC: 151 MG/DL (ref 70–110)
GLUCOSE SERPL-MCNC: 156 MG/DL (ref 70–110)
GLUCOSE SERPL-MCNC: 157 MG/DL (ref 70–110)
GLUCOSE SERPL-MCNC: 161 MG/DL (ref 70–110)
GLUCOSE SERPL-MCNC: 168 MG/DL (ref 70–110)
GLUCOSE SERPL-MCNC: 171 MG/DL (ref 70–110)
GLUCOSE SERPL-MCNC: 210 MG/DL (ref 70–110)
GLUCOSE SERPL-MCNC: 240 MG/DL (ref 70–110)
GLUCOSE SERPL-MCNC: 73 MG/DL (ref 70–110)
GLUCOSE SERPL-MCNC: 80 MG/DL (ref 70–110)
GLUCOSE SERPL-MCNC: 82 MG/DL (ref 70–110)
GLUCOSE SERPL-MCNC: 84 MG/DL (ref 70–110)
GLUCOSE SERPL-MCNC: 87 MG/DL (ref 70–110)
GLUCOSE SERPL-MCNC: 87 MG/DL (ref 70–110)
GLUCOSE SERPL-MCNC: 88 MG/DL (ref 70–110)
GLUCOSE SERPL-MCNC: 88 MG/DL (ref 70–110)
GLUCOSE SERPL-MCNC: 89 MG/DL (ref 70–110)
GLUCOSE SERPL-MCNC: 90 MG/DL (ref 70–110)
GLUCOSE SERPL-MCNC: 90 MG/DL (ref 70–110)
GLUCOSE SERPL-MCNC: 91 MG/DL (ref 70–110)
GLUCOSE SERPL-MCNC: 92 MG/DL (ref 70–110)
GLUCOSE SERPL-MCNC: 92 MG/DL (ref 70–110)
GLUCOSE SERPL-MCNC: 93 MG/DL (ref 70–110)
GLUCOSE SERPL-MCNC: 93 MG/DL (ref 70–110)
GLUCOSE SERPL-MCNC: 94 MG/DL (ref 70–110)
GLUCOSE SERPL-MCNC: 95 MG/DL (ref 70–110)
GLUCOSE SERPL-MCNC: 96 MG/DL (ref 70–110)
GLUCOSE SERPL-MCNC: 97 MG/DL (ref 70–110)
GLUCOSE SERPL-MCNC: 98 MG/DL (ref 70–110)
GLUCOSE UR QL STRIP: ABNORMAL
GLUCOSE UR QL STRIP: NEGATIVE
GRAM STN SPEC: NORMAL
HBA1C MFR BLD: 5.6 % (ref 4–5.6)
HBA1C MFR BLD: 5.7 % (ref 4–5.6)
HCO3 UR-SCNC: 19 MMOL/L (ref 24–28)
HCO3 UR-SCNC: 22.5 MMOL/L (ref 24–28)
HCO3 UR-SCNC: 23.2 MMOL/L (ref 24–28)
HCO3 UR-SCNC: 23.4 MMOL/L (ref 24–28)
HCO3 UR-SCNC: 23.6 MMOL/L (ref 24–28)
HCO3 UR-SCNC: 23.6 MMOL/L (ref 24–28)
HCO3 UR-SCNC: 24.2 MMOL/L (ref 24–28)
HCO3 UR-SCNC: 24.3 MMOL/L (ref 24–28)
HCO3 UR-SCNC: 24.5 MMOL/L (ref 24–28)
HCO3 UR-SCNC: 24.5 MMOL/L (ref 24–28)
HCO3 UR-SCNC: 24.6 MMOL/L (ref 24–28)
HCO3 UR-SCNC: 24.7 MMOL/L (ref 24–28)
HCO3 UR-SCNC: 25 MMOL/L (ref 24–28)
HCO3 UR-SCNC: 25.1 MMOL/L (ref 24–28)
HCO3 UR-SCNC: 25.6 MMOL/L (ref 24–28)
HCO3 UR-SCNC: 25.9 MMOL/L (ref 24–28)
HCO3 UR-SCNC: 25.9 MMOL/L (ref 24–28)
HCO3 UR-SCNC: 26.3 MMOL/L (ref 24–28)
HCO3 UR-SCNC: 26.4 MMOL/L (ref 24–28)
HCO3 UR-SCNC: 26.4 MMOL/L (ref 24–28)
HCO3 UR-SCNC: 26.7 MMOL/L (ref 24–28)
HCO3 UR-SCNC: 26.9 MMOL/L (ref 24–28)
HCO3 UR-SCNC: 27.1 MMOL/L (ref 24–28)
HCO3 UR-SCNC: 27.1 MMOL/L (ref 24–28)
HCO3 UR-SCNC: 27.2 MMOL/L (ref 24–28)
HCO3 UR-SCNC: 27.4 MMOL/L (ref 24–28)
HCO3 UR-SCNC: 27.4 MMOL/L (ref 24–28)
HCO3 UR-SCNC: 27.5 MMOL/L (ref 24–28)
HCO3 UR-SCNC: 27.7 MMOL/L (ref 24–28)
HCO3 UR-SCNC: 27.7 MMOL/L (ref 24–28)
HCO3 UR-SCNC: 27.8 MMOL/L (ref 24–28)
HCO3 UR-SCNC: 28.3 MMOL/L (ref 24–28)
HCO3 UR-SCNC: 28.4 MMOL/L (ref 24–28)
HCO3 UR-SCNC: 29.1 MMOL/L (ref 24–28)
HCO3 UR-SCNC: 29.2 MMOL/L (ref 24–28)
HCO3 UR-SCNC: 29.3 MMOL/L (ref 24–28)
HCO3 UR-SCNC: 29.7 MMOL/L (ref 24–28)
HCO3 UR-SCNC: 30.7 MMOL/L (ref 24–28)
HCO3 UR-SCNC: 30.8 MMOL/L (ref 24–28)
HCO3 UR-SCNC: 31.5 MMOL/L (ref 24–28)
HCO3 UR-SCNC: 32.4 MMOL/L (ref 24–28)
HCO3 UR-SCNC: 32.8 MMOL/L (ref 24–28)
HCO3 UR-SCNC: 32.9 MMOL/L (ref 24–28)
HCO3 UR-SCNC: 34 MMOL/L (ref 24–28)
HCO3 UR-SCNC: 34.8 MMOL/L (ref 24–28)
HCO3 UR-SCNC: 35.7 MMOL/L (ref 24–28)
HCO3 UR-SCNC: 36.5 MMOL/L (ref 24–28)
HCO3 UR-SCNC: 36.7 MMOL/L (ref 24–28)
HCO3 UR-SCNC: 37.1 MMOL/L (ref 24–28)
HCO3 UR-SCNC: 37.2 MMOL/L (ref 24–28)
HCO3 UR-SCNC: 37.9 MMOL/L (ref 24–28)
HCO3 UR-SCNC: 39.6 MMOL/L (ref 24–28)
HCT VFR BLD AUTO: 26.4 % (ref 40–54)
HCT VFR BLD AUTO: 26.5 % (ref 40–54)
HCT VFR BLD AUTO: 26.8 % (ref 40–54)
HCT VFR BLD AUTO: 26.8 % (ref 40–54)
HCT VFR BLD AUTO: 27.1 % (ref 40–54)
HCT VFR BLD AUTO: 27.2 % (ref 40–54)
HCT VFR BLD AUTO: 27.3 % (ref 40–54)
HCT VFR BLD AUTO: 27.5 % (ref 40–54)
HCT VFR BLD AUTO: 27.6 % (ref 40–54)
HCT VFR BLD AUTO: 27.7 % (ref 40–54)
HCT VFR BLD AUTO: 27.7 % (ref 40–54)
HCT VFR BLD AUTO: 27.9 % (ref 40–54)
HCT VFR BLD AUTO: 28.1 % (ref 40–54)
HCT VFR BLD AUTO: 28.2 % (ref 40–54)
HCT VFR BLD AUTO: 28.3 % (ref 40–54)
HCT VFR BLD AUTO: 28.4 % (ref 40–54)
HCT VFR BLD AUTO: 28.6 % (ref 40–54)
HCT VFR BLD AUTO: 28.9 % (ref 40–54)
HCT VFR BLD AUTO: 29 % (ref 40–54)
HCT VFR BLD AUTO: 29.1 % (ref 40–54)
HCT VFR BLD AUTO: 29.2 % (ref 40–54)
HCT VFR BLD AUTO: 29.5 % (ref 40–54)
HCT VFR BLD AUTO: 32 % (ref 40–54)
HCT VFR BLD AUTO: 32.4 % (ref 40–54)
HCT VFR BLD AUTO: 32.6 % (ref 40–54)
HCT VFR BLD AUTO: 32.8 % (ref 40–54)
HCT VFR BLD AUTO: 32.9 % (ref 40–54)
HCT VFR BLD AUTO: 33.1 % (ref 40–54)
HCT VFR BLD AUTO: 33.6 % (ref 40–54)
HCT VFR BLD AUTO: 33.8 % (ref 40–54)
HCT VFR BLD AUTO: 33.8 % (ref 40–54)
HCT VFR BLD AUTO: 34.2 % (ref 40–54)
HCT VFR BLD AUTO: 34.4 % (ref 40–54)
HCT VFR BLD AUTO: 34.7 % (ref 40–54)
HCT VFR BLD AUTO: 35.5 % (ref 40–54)
HCT VFR BLD AUTO: 35.5 % (ref 40–54)
HCT VFR BLD AUTO: 35.9 % (ref 40–54)
HCT VFR BLD AUTO: 36.1 % (ref 40–54)
HCT VFR BLD AUTO: 36.1 % (ref 40–54)
HCT VFR BLD AUTO: 36.3 % (ref 40–54)
HCT VFR BLD AUTO: 36.5 % (ref 40–54)
HCT VFR BLD AUTO: 36.6 % (ref 40–54)
HCT VFR BLD AUTO: 36.6 % (ref 40–54)
HCT VFR BLD AUTO: 36.8 % (ref 40–54)
HCT VFR BLD AUTO: 36.8 % (ref 40–54)
HCT VFR BLD AUTO: 37.1 % (ref 40–54)
HCT VFR BLD AUTO: 37.3 % (ref 40–54)
HCT VFR BLD AUTO: 37.7 % (ref 40–54)
HCT VFR BLD AUTO: 38 % (ref 40–54)
HCT VFR BLD AUTO: 38.7 % (ref 40–54)
HCT VFR BLD AUTO: 38.8 % (ref 40–54)
HCT VFR BLD AUTO: 38.8 % (ref 40–54)
HCT VFR BLD AUTO: 39 % (ref 40–54)
HCT VFR BLD AUTO: 39.1 % (ref 40–54)
HCT VFR BLD AUTO: 39.2 % (ref 40–54)
HCT VFR BLD AUTO: 39.3 % (ref 40–54)
HCT VFR BLD AUTO: 39.3 % (ref 40–54)
HCT VFR BLD AUTO: 39.5 % (ref 40–54)
HCT VFR BLD AUTO: 39.6 % (ref 40–54)
HCT VFR BLD AUTO: 39.6 % (ref 40–54)
HCT VFR BLD AUTO: 39.7 % (ref 40–54)
HCT VFR BLD AUTO: 39.8 % (ref 40–54)
HCT VFR BLD AUTO: 41.2 % (ref 40–54)
HCT VFR BLD AUTO: 42.1 % (ref 40–54)
HCT VFR BLD AUTO: 42.3 % (ref 40–54)
HCT VFR BLD AUTO: 42.4 % (ref 40–54)
HCT VFR BLD AUTO: 44.1 % (ref 40–54)
HCT VFR BLD AUTO: 45.4 % (ref 40–54)
HCT VFR BLD CALC: 29 %PCV (ref 36–54)
HCT VFR BLD CALC: 33 %PCV (ref 36–54)
HCT VFR BLD CALC: 37 %PCV (ref 36–54)
HCT VFR BLD CALC: 39 %PCV (ref 36–54)
HCT VFR BLD CALC: 42 %PCV (ref 36–54)
HCV AB SERPL QL IA: NEGATIVE
HCV AB SERPL QL IA: NEGATIVE
HCYS SERPL-SCNC: 20.7 UMOL/L (ref 4–16.5)
HDLC SERPL-MCNC: 50 MG/DL (ref 40–75)
HDLC SERPL: 34.5 % (ref 20–50)
HGB BLD-MCNC: 10.2 G/DL (ref 14–18)
HGB BLD-MCNC: 10.3 G/DL (ref 14–18)
HGB BLD-MCNC: 10.4 G/DL (ref 14–18)
HGB BLD-MCNC: 10.5 G/DL (ref 14–18)
HGB BLD-MCNC: 10.6 G/DL (ref 14–18)
HGB BLD-MCNC: 10.8 G/DL (ref 14–18)
HGB BLD-MCNC: 10.9 G/DL (ref 14–18)
HGB BLD-MCNC: 10.9 G/DL (ref 14–18)
HGB BLD-MCNC: 11 G/DL (ref 14–18)
HGB BLD-MCNC: 11.1 G/DL (ref 14–18)
HGB BLD-MCNC: 11.1 G/DL (ref 14–18)
HGB BLD-MCNC: 11.2 G/DL (ref 14–18)
HGB BLD-MCNC: 11.3 G/DL (ref 14–18)
HGB BLD-MCNC: 11.4 G/DL (ref 14–18)
HGB BLD-MCNC: 11.5 G/DL (ref 14–18)
HGB BLD-MCNC: 11.7 G/DL (ref 14–18)
HGB BLD-MCNC: 11.8 G/DL (ref 14–18)
HGB BLD-MCNC: 11.8 G/DL (ref 14–18)
HGB BLD-MCNC: 11.9 G/DL (ref 14–18)
HGB BLD-MCNC: 11.9 G/DL (ref 14–18)
HGB BLD-MCNC: 12 G/DL (ref 14–18)
HGB BLD-MCNC: 12 G/DL (ref 14–18)
HGB BLD-MCNC: 12.1 G/DL (ref 14–18)
HGB BLD-MCNC: 12.3 G/DL (ref 14–18)
HGB BLD-MCNC: 12.4 G/DL (ref 14–18)
HGB BLD-MCNC: 12.6 G/DL (ref 14–18)
HGB BLD-MCNC: 12.8 G/DL (ref 14–18)
HGB BLD-MCNC: 12.9 G/DL (ref 14–18)
HGB BLD-MCNC: 13 G/DL (ref 14–18)
HGB BLD-MCNC: 13.1 G/DL (ref 14–18)
HGB BLD-MCNC: 13.1 G/DL (ref 14–18)
HGB BLD-MCNC: 13.5 G/DL (ref 14–18)
HGB BLD-MCNC: 13.7 G/DL (ref 14–18)
HGB BLD-MCNC: 14.3 G/DL (ref 14–18)
HGB BLD-MCNC: 8.4 G/DL (ref 14–18)
HGB BLD-MCNC: 8.5 G/DL (ref 14–18)
HGB BLD-MCNC: 8.6 G/DL (ref 14–18)
HGB BLD-MCNC: 8.6 G/DL (ref 14–18)
HGB BLD-MCNC: 8.7 G/DL (ref 14–18)
HGB BLD-MCNC: 8.8 G/DL (ref 14–18)
HGB BLD-MCNC: 8.9 G/DL (ref 14–18)
HGB BLD-MCNC: 8.9 G/DL (ref 14–18)
HGB BLD-MCNC: 9 G/DL (ref 14–18)
HGB BLD-MCNC: 9.1 G/DL (ref 14–18)
HGB BLD-MCNC: 9.1 G/DL (ref 14–18)
HGB BLD-MCNC: 9.2 G/DL (ref 14–18)
HGB BLD-MCNC: 9.2 G/DL (ref 14–18)
HGB BLD-MCNC: 9.3 G/DL (ref 14–18)
HGB BLD-MCNC: 9.4 G/DL (ref 14–18)
HGB BLD-MCNC: 9.4 G/DL (ref 14–18)
HGB BLD-MCNC: 9.5 G/DL (ref 14–18)
HGB UR QL STRIP: ABNORMAL
HGB UR QL STRIP: NEGATIVE
HIV 1+2 AB+HIV1 P24 AG SERPL QL IA: NEGATIVE
HPIV1 RNA NPH QL NAA+NON-PROBE: NOT DETECTED
HPIV2 RNA NPH QL NAA+NON-PROBE: NOT DETECTED
HPIV3 RNA NPH QL NAA+NON-PROBE: NOT DETECTED
HPIV4 RNA NPH QL NAA+NON-PROBE: NOT DETECTED
HUMAN METAPNEUMOVIRUS: NOT DETECTED
HYALINE CASTS UR QL AUTO: 17 /LPF
HYALINE CASTS UR QL AUTO: 31 /LPF
HYALINE CASTS UR QL AUTO: 4 /LPF
IMM GRANULOCYTES # BLD AUTO: 0.01 K/UL (ref 0–0.04)
IMM GRANULOCYTES # BLD AUTO: 0.02 K/UL (ref 0–0.04)
IMM GRANULOCYTES # BLD AUTO: 0.03 K/UL (ref 0–0.04)
IMM GRANULOCYTES # BLD AUTO: 0.05 K/UL (ref 0–0.04)
IMM GRANULOCYTES # BLD AUTO: 0.06 K/UL (ref 0–0.04)
IMM GRANULOCYTES # BLD AUTO: 0.07 K/UL (ref 0–0.04)
IMM GRANULOCYTES # BLD AUTO: 0.08 K/UL (ref 0–0.04)
IMM GRANULOCYTES # BLD AUTO: 0.09 K/UL (ref 0–0.04)
IMM GRANULOCYTES # BLD AUTO: 0.1 K/UL (ref 0–0.04)
IMM GRANULOCYTES # BLD AUTO: 0.11 K/UL (ref 0–0.04)
IMM GRANULOCYTES # BLD AUTO: 0.12 K/UL (ref 0–0.04)
IMM GRANULOCYTES # BLD AUTO: 0.12 K/UL (ref 0–0.04)
IMM GRANULOCYTES # BLD AUTO: 0.13 K/UL (ref 0–0.04)
IMM GRANULOCYTES # BLD AUTO: 0.15 K/UL (ref 0–0.04)
IMM GRANULOCYTES # BLD AUTO: 0.27 K/UL (ref 0–0.04)
IMM GRANULOCYTES # BLD AUTO: 0.7 K/UL (ref 0–0.04)
IMM GRANULOCYTES NFR BLD AUTO: 0.1 % (ref 0–0.5)
IMM GRANULOCYTES NFR BLD AUTO: 0.2 % (ref 0–0.5)
IMM GRANULOCYTES NFR BLD AUTO: 0.3 % (ref 0–0.5)
IMM GRANULOCYTES NFR BLD AUTO: 0.4 % (ref 0–0.5)
IMM GRANULOCYTES NFR BLD AUTO: 0.5 % (ref 0–0.5)
IMM GRANULOCYTES NFR BLD AUTO: 0.6 % (ref 0–0.5)
IMM GRANULOCYTES NFR BLD AUTO: 0.7 % (ref 0–0.5)
IMM GRANULOCYTES NFR BLD AUTO: 0.7 % (ref 0–0.5)
IMM GRANULOCYTES NFR BLD AUTO: 0.8 % (ref 0–0.5)
IMM GRANULOCYTES NFR BLD AUTO: 0.9 % (ref 0–0.5)
IMM GRANULOCYTES NFR BLD AUTO: 1 % (ref 0–0.5)
IMM GRANULOCYTES NFR BLD AUTO: 1.1 % (ref 0–0.5)
IMM GRANULOCYTES NFR BLD AUTO: 1.2 % (ref 0–0.5)
IMM GRANULOCYTES NFR BLD AUTO: 1.2 % (ref 0–0.5)
IMM GRANULOCYTES NFR BLD AUTO: 1.3 % (ref 0–0.5)
IMM GRANULOCYTES NFR BLD AUTO: 1.4 % (ref 0–0.5)
IMM GRANULOCYTES NFR BLD AUTO: 1.5 % (ref 0–0.5)
IMM GRANULOCYTES NFR BLD AUTO: 1.6 % (ref 0–0.5)
IMM GRANULOCYTES NFR BLD AUTO: 1.9 % (ref 0–0.5)
IMM GRANULOCYTES NFR BLD AUTO: 3.1 % (ref 0–0.5)
IMM GRANULOCYTES NFR BLD AUTO: 3.9 % (ref 0–0.5)
INFLUENZA A (SUBTYPES H1,H1-2009,H3): NOT DETECTED
INFLUENZA A, MOLECULAR: NEGATIVE
INFLUENZA A, MOLECULAR: NEGATIVE
INFLUENZA B, MOLECULAR: NEGATIVE
INFLUENZA B, MOLECULAR: NEGATIVE
INR PPP: 1 (ref 0.8–1.2)
INTERVENTRICULAR SEPTUM: 0.57 CM (ref 0.6–1.1)
INTERVENTRICULAR SEPTUM: 0.71 CM (ref 0.6–1.1)
INTERVENTRICULAR SEPTUM: 0.74 CM (ref 0.6–1.1)
INTERVENTRICULAR SEPTUM: 0.75 CM (ref 0.6–1.1)
IP: 20
IRON SERPL-MCNC: 47 UG/DL (ref 45–160)
IRON SERPL-MCNC: 54 UG/DL (ref 45–160)
IVRT: 110.37 MSEC
IVRT: 117.03 MSEC
KETONES UR QL STRIP: NEGATIVE
L PNEUMO AG UR QL IA: NEGATIVE
LA 3 SCREEN W REFLEX-IMP: NORMAL
LA MAJOR: 4.87 CM
LA MAJOR: 5.35 CM
LA MAJOR: 5.9 CM
LA MAJOR: 7.26 CM
LA MINOR: 5.13 CM
LA MINOR: 5.84 CM
LA MINOR: 5.87 CM
LA MINOR: 5.88 CM
LA NT DPL PPP QL: NORMAL
LA WIDTH: 3.28 CM
LA WIDTH: 3.84 CM
LA WIDTH: 4.26 CM
LA WIDTH: 4.76 CM
LACTATE SERPL-SCNC: 0.6 MMOL/L (ref 0.5–2.2)
LACTATE SERPL-SCNC: 0.8 MMOL/L (ref 0.5–2.2)
LACTATE SERPL-SCNC: 1 MMOL/L (ref 0.5–2.2)
LACTATE SERPL-SCNC: 1 MMOL/L (ref 0.5–2.2)
LACTATE SERPL-SCNC: 1.1 MMOL/L (ref 0.5–2.2)
LACTATE SERPL-SCNC: 1.2 MMOL/L (ref 0.5–2.2)
LACTATE SERPL-SCNC: 1.3 MMOL/L (ref 0.5–2.2)
LACTATE SERPL-SCNC: 1.4 MMOL/L (ref 0.5–2.2)
LACTATE SERPL-SCNC: 1.5 MMOL/L (ref 0.5–2.2)
LACTATE SERPL-SCNC: 1.8 MMOL/L (ref 0.5–2.2)
LDH SERPL L TO P-CCNC: 0.83 MMOL/L (ref 0.5–2.2)
LDH SERPL L TO P-CCNC: 0.85 MMOL/L (ref 0.36–1.25)
LDH SERPL L TO P-CCNC: 0.9 MMOL/L (ref 0.5–2.2)
LDH SERPL L TO P-CCNC: 1.05 MMOL/L (ref 0.5–2.2)
LDH SERPL L TO P-CCNC: 1.72 MMOL/L (ref 0.36–1.25)
LDH SERPL L TO P-CCNC: 165 U/L (ref 110–260)
LDH SERPL L TO P-CCNC: 472 U/L (ref 110–260)
LDLC SERPL CALC-MCNC: 57.8 MG/DL (ref 63–159)
LEFT ABI: 1.1
LEFT ARM BP: 120 MMHG
LEFT ATRIUM SIZE: 3.32 CM
LEFT ATRIUM SIZE: 3.57 CM
LEFT ATRIUM SIZE: 3.99 CM
LEFT ATRIUM SIZE: 4.17 CM
LEFT ATRIUM VOLUME INDEX MOD: 20.9 ML/M2
LEFT ATRIUM VOLUME INDEX MOD: 40.1 ML/M2
LEFT ATRIUM VOLUME INDEX MOD: 42.9 ML/M2
LEFT ATRIUM VOLUME INDEX: 23.6 ML/M2
LEFT ATRIUM VOLUME INDEX: 36.9 ML/M2
LEFT ATRIUM VOLUME INDEX: 37.1 ML/M2
LEFT ATRIUM VOLUME INDEX: 46.9 ML/M2
LEFT ATRIUM VOLUME MOD: 43.52 CM3
LEFT ATRIUM VOLUME MOD: 83.42 CM3
LEFT ATRIUM VOLUME MOD: 87.54 CM3
LEFT ATRIUM VOLUME: 49.16 CM3
LEFT ATRIUM VOLUME: 75.65 CM3
LEFT ATRIUM VOLUME: 76.71 CM3
LEFT ATRIUM VOLUME: 98.02 CM3
LEFT CBA DIAS: 12 CM/S
LEFT CBA SYS: 60 CM/S
LEFT CCA DIST DIAS: 27 CM/S
LEFT CCA DIST SYS: 93 CM/S
LEFT CCA MID DIAS: 36 CM/S
LEFT CCA MID SYS: 136 CM/S
LEFT CCA PROX DIAS: 34 CM/S
LEFT CCA PROX SYS: 132 CM/S
LEFT DORSALIS PEDIS: 131 MMHG
LEFT ECA DIAS: 16 CM/S
LEFT ECA SYS: 81 CM/S
LEFT ICA DIST DIAS: 27 CM/S
LEFT ICA DIST SYS: 65 CM/S
LEFT ICA MID DIAS: 25 CM/S
LEFT ICA MID SYS: 62 CM/S
LEFT ICA PROX DIAS: 24 CM/S
LEFT ICA PROX SYS: 54 CM/S
LEFT INTERNAL DIMENSION IN SYSTOLE: 6.08 CM (ref 2.1–4)
LEFT INTERNAL DIMENSION IN SYSTOLE: 6.61 CM (ref 2.1–4)
LEFT INTERNAL DIMENSION IN SYSTOLE: 6.62 CM (ref 2.1–4)
LEFT INTERNAL DIMENSION IN SYSTOLE: 7.41 CM (ref 2.1–4)
LEFT POSTERIOR TIBIAL: 132 MMHG
LEFT VENTRICLE DIASTOLIC VOLUME INDEX: 124.26 ML/M2
LEFT VENTRICLE DIASTOLIC VOLUME INDEX: 129.67 ML/M2
LEFT VENTRICLE DIASTOLIC VOLUME INDEX: 150.34 ML/M2
LEFT VENTRICLE DIASTOLIC VOLUME INDEX: 159.62 ML/M2
LEFT VENTRICLE DIASTOLIC VOLUME: 259.7 ML
LEFT VENTRICLE DIASTOLIC VOLUME: 264.53 ML
LEFT VENTRICLE DIASTOLIC VOLUME: 312.71 ML
LEFT VENTRICLE DIASTOLIC VOLUME: 332 ML
LEFT VENTRICLE MASS INDEX: 127 G/M2
LEFT VENTRICLE MASS INDEX: 133 G/M2
LEFT VENTRICLE MASS INDEX: 133 G/M2
LEFT VENTRICLE MASS INDEX: 149 G/M2
LEFT VENTRICLE SYSTOLIC VOLUME INDEX: 107.7 ML/M2
LEFT VENTRICLE SYSTOLIC VOLUME INDEX: 108 ML/M2
LEFT VENTRICLE SYSTOLIC VOLUME INDEX: 139.8 ML/M2
LEFT VENTRICLE SYSTOLIC VOLUME INDEX: 90.9 ML/M2
LEFT VENTRICLE SYSTOLIC VOLUME: 185.44 ML
LEFT VENTRICLE SYSTOLIC VOLUME: 224.55 ML
LEFT VENTRICLE SYSTOLIC VOLUME: 225.03 ML
LEFT VENTRICLE SYSTOLIC VOLUME: 290.73 ML
LEFT VENTRICULAR INTERNAL DIMENSION IN DIASTOLE: 7.05 CM (ref 3.5–6)
LEFT VENTRICULAR INTERNAL DIMENSION IN DIASTOLE: 7.11 CM (ref 3.5–6)
LEFT VENTRICULAR INTERNAL DIMENSION IN DIASTOLE: 7.8 CM (ref 3.5–6)
LEFT VENTRICULAR INTERNAL DIMENSION IN DIASTOLE: 7.87 CM (ref 3.5–6)
LEFT VENTRICULAR MASS: 266.44 G
LEFT VENTRICULAR MASS: 270.48 G
LEFT VENTRICULAR MASS: 277.16 G
LEFT VENTRICULAR MASS: 310.64 G
LEFT VERTEBRAL DIAS: 15 CM/S
LEFT VERTEBRAL SYS: 36 CM/S
LEUKOCYTE ESTERASE UR QL STRIP: ABNORMAL
LEUKOCYTE ESTERASE UR QL STRIP: ABNORMAL
LEUKOCYTE ESTERASE UR QL STRIP: NEGATIVE
LIPASE SERPL-CCNC: 10 U/L (ref 4–60)
LIPASE SERPL-CCNC: 6 U/L (ref 4–60)
LIPASE SERPL-CCNC: 8 U/L (ref 4–60)
LIPASE SERPL-CCNC: 8 U/L (ref 4–60)
LV LATERAL E/E' RATIO: 3.55 M/S
LV LATERAL E/E' RATIO: 5.44 M/S
LV LATERAL E/E' RATIO: 5.5 M/S
LV SEPTAL E/E' RATIO: 16.33 M/S
LV SEPTAL E/E' RATIO: 4.88 M/S
LV SEPTAL E/E' RATIO: 5.5 M/S
LYMPHOCYTES # BLD AUTO: 0.3 K/UL (ref 1–4.8)
LYMPHOCYTES # BLD AUTO: 0.5 K/UL (ref 1–4.8)
LYMPHOCYTES # BLD AUTO: 0.6 K/UL (ref 1–4.8)
LYMPHOCYTES # BLD AUTO: 0.7 K/UL (ref 1–4.8)
LYMPHOCYTES # BLD AUTO: 0.8 K/UL (ref 1–4.8)
LYMPHOCYTES # BLD AUTO: 0.9 K/UL (ref 1–4.8)
LYMPHOCYTES # BLD AUTO: 1 K/UL (ref 1–4.8)
LYMPHOCYTES # BLD AUTO: 1.2 K/UL (ref 1–4.8)
LYMPHOCYTES # BLD AUTO: 1.3 K/UL (ref 1–4.8)
LYMPHOCYTES # BLD AUTO: 1.4 K/UL (ref 1–4.8)
LYMPHOCYTES # BLD AUTO: 1.5 K/UL (ref 1–4.8)
LYMPHOCYTES # BLD AUTO: 1.6 K/UL (ref 1–4.8)
LYMPHOCYTES # BLD AUTO: 1.7 K/UL (ref 1–4.8)
LYMPHOCYTES # BLD AUTO: 1.7 K/UL (ref 1–4.8)
LYMPHOCYTES # BLD AUTO: 1.8 K/UL (ref 1–4.8)
LYMPHOCYTES # BLD AUTO: 1.9 K/UL (ref 1–4.8)
LYMPHOCYTES # BLD AUTO: 2 K/UL (ref 1–4.8)
LYMPHOCYTES # BLD AUTO: 2.1 K/UL (ref 1–4.8)
LYMPHOCYTES # BLD AUTO: 2.2 K/UL (ref 1–4.8)
LYMPHOCYTES # BLD AUTO: 2.3 K/UL (ref 1–4.8)
LYMPHOCYTES # BLD AUTO: 2.3 K/UL (ref 1–4.8)
LYMPHOCYTES # BLD AUTO: 2.4 K/UL (ref 1–4.8)
LYMPHOCYTES # BLD AUTO: 2.5 K/UL (ref 1–4.8)
LYMPHOCYTES # BLD AUTO: 2.5 K/UL (ref 1–4.8)
LYMPHOCYTES # BLD AUTO: 2.6 K/UL (ref 1–4.8)
LYMPHOCYTES # BLD AUTO: 2.6 K/UL (ref 1–4.8)
LYMPHOCYTES # BLD AUTO: 3.1 K/UL (ref 1–4.8)
LYMPHOCYTES NFR BLD: 11.7 % (ref 18–48)
LYMPHOCYTES NFR BLD: 12.4 % (ref 18–48)
LYMPHOCYTES NFR BLD: 12.6 % (ref 18–48)
LYMPHOCYTES NFR BLD: 14.6 % (ref 18–48)
LYMPHOCYTES NFR BLD: 15.7 % (ref 18–48)
LYMPHOCYTES NFR BLD: 15.9 % (ref 18–48)
LYMPHOCYTES NFR BLD: 16.9 % (ref 18–48)
LYMPHOCYTES NFR BLD: 16.9 % (ref 18–48)
LYMPHOCYTES NFR BLD: 17.3 % (ref 18–48)
LYMPHOCYTES NFR BLD: 17.5 % (ref 18–48)
LYMPHOCYTES NFR BLD: 18.2 % (ref 18–48)
LYMPHOCYTES NFR BLD: 18.3 % (ref 18–48)
LYMPHOCYTES NFR BLD: 18.3 % (ref 18–48)
LYMPHOCYTES NFR BLD: 18.5 % (ref 18–48)
LYMPHOCYTES NFR BLD: 18.6 % (ref 18–48)
LYMPHOCYTES NFR BLD: 18.6 % (ref 18–48)
LYMPHOCYTES NFR BLD: 19.2 % (ref 18–48)
LYMPHOCYTES NFR BLD: 19.7 % (ref 18–48)
LYMPHOCYTES NFR BLD: 2.7 % (ref 18–48)
LYMPHOCYTES NFR BLD: 21.1 % (ref 18–48)
LYMPHOCYTES NFR BLD: 21.4 % (ref 18–48)
LYMPHOCYTES NFR BLD: 21.5 % (ref 18–48)
LYMPHOCYTES NFR BLD: 21.8 % (ref 18–48)
LYMPHOCYTES NFR BLD: 22.5 % (ref 18–48)
LYMPHOCYTES NFR BLD: 23.4 % (ref 18–48)
LYMPHOCYTES NFR BLD: 23.8 % (ref 18–48)
LYMPHOCYTES NFR BLD: 25.2 % (ref 18–48)
LYMPHOCYTES NFR BLD: 25.4 % (ref 18–48)
LYMPHOCYTES NFR BLD: 25.8 % (ref 18–48)
LYMPHOCYTES NFR BLD: 26.1 % (ref 18–48)
LYMPHOCYTES NFR BLD: 26.3 % (ref 18–48)
LYMPHOCYTES NFR BLD: 26.3 % (ref 18–48)
LYMPHOCYTES NFR BLD: 26.4 % (ref 18–48)
LYMPHOCYTES NFR BLD: 26.5 % (ref 18–48)
LYMPHOCYTES NFR BLD: 26.9 % (ref 18–48)
LYMPHOCYTES NFR BLD: 27 % (ref 18–48)
LYMPHOCYTES NFR BLD: 28.2 % (ref 18–48)
LYMPHOCYTES NFR BLD: 28.3 % (ref 18–48)
LYMPHOCYTES NFR BLD: 28.7 % (ref 18–48)
LYMPHOCYTES NFR BLD: 29.3 % (ref 18–48)
LYMPHOCYTES NFR BLD: 29.4 % (ref 18–48)
LYMPHOCYTES NFR BLD: 29.4 % (ref 18–48)
LYMPHOCYTES NFR BLD: 29.8 % (ref 18–48)
LYMPHOCYTES NFR BLD: 30.4 % (ref 18–48)
LYMPHOCYTES NFR BLD: 30.7 % (ref 18–48)
LYMPHOCYTES NFR BLD: 30.9 % (ref 18–48)
LYMPHOCYTES NFR BLD: 31.1 % (ref 18–48)
LYMPHOCYTES NFR BLD: 31.2 % (ref 18–48)
LYMPHOCYTES NFR BLD: 31.3 % (ref 18–48)
LYMPHOCYTES NFR BLD: 31.3 % (ref 18–48)
LYMPHOCYTES NFR BLD: 32.5 % (ref 18–48)
LYMPHOCYTES NFR BLD: 33 % (ref 18–48)
LYMPHOCYTES NFR BLD: 33.7 % (ref 18–48)
LYMPHOCYTES NFR BLD: 35.9 % (ref 18–48)
LYMPHOCYTES NFR BLD: 38.2 % (ref 18–48)
LYMPHOCYTES NFR BLD: 4.6 % (ref 18–48)
LYMPHOCYTES NFR BLD: 45.2 % (ref 18–48)
LYMPHOCYTES NFR BLD: 5.3 % (ref 18–48)
LYMPHOCYTES NFR BLD: 5.5 % (ref 18–48)
LYMPHOCYTES NFR BLD: 6.3 % (ref 18–48)
LYMPHOCYTES NFR BLD: 6.6 % (ref 18–48)
LYMPHOCYTES NFR BLD: 6.8 % (ref 18–48)
LYMPHOCYTES NFR BLD: 6.9 % (ref 18–48)
LYMPHOCYTES NFR BLD: 7.4 % (ref 18–48)
LYMPHOCYTES NFR BLD: 7.6 % (ref 18–48)
LYMPHOCYTES NFR BLD: 7.8 % (ref 18–48)
LYMPHOCYTES NFR BLD: 8.7 % (ref 18–48)
LYMPHOCYTES NFR BLD: 9.8 % (ref 18–48)
Lab: NORMAL
Lab: NORMAL
M TB IFN-G CD4+ BCKGRND COR BLD-ACNC: 0.01 IU/ML
MAGNESIUM SERPL-MCNC: 1.7 MG/DL (ref 1.6–2.6)
MAGNESIUM SERPL-MCNC: 1.8 MG/DL (ref 1.6–2.6)
MAGNESIUM SERPL-MCNC: 1.9 MG/DL (ref 1.6–2.6)
MAGNESIUM SERPL-MCNC: 2 MG/DL (ref 1.6–2.6)
MAGNESIUM SERPL-MCNC: 2.1 MG/DL (ref 1.6–2.6)
MAGNESIUM SERPL-MCNC: 2.2 MG/DL (ref 1.6–2.6)
MAGNESIUM SERPL-MCNC: 2.3 MG/DL (ref 1.6–2.6)
MAGNESIUM SERPL-MCNC: 2.4 MG/DL (ref 1.6–2.6)
MAGNESIUM SERPL-MCNC: 2.4 MG/DL (ref 1.6–2.6)
MAGNESIUM SERPL-MCNC: 2.5 MG/DL (ref 1.6–2.6)
MCH RBC QN AUTO: 26.9 PG (ref 27–31)
MCH RBC QN AUTO: 27.2 PG (ref 27–31)
MCH RBC QN AUTO: 27.3 PG (ref 27–31)
MCH RBC QN AUTO: 27.4 PG (ref 27–31)
MCH RBC QN AUTO: 27.5 PG (ref 27–31)
MCH RBC QN AUTO: 27.6 PG (ref 27–31)
MCH RBC QN AUTO: 27.6 PG (ref 27–31)
MCH RBC QN AUTO: 27.7 PG (ref 27–31)
MCH RBC QN AUTO: 27.8 PG (ref 27–31)
MCH RBC QN AUTO: 27.9 PG (ref 27–31)
MCH RBC QN AUTO: 28 PG (ref 27–31)
MCH RBC QN AUTO: 28.1 PG (ref 27–31)
MCH RBC QN AUTO: 28.2 PG (ref 27–31)
MCH RBC QN AUTO: 28.3 PG (ref 27–31)
MCH RBC QN AUTO: 28.4 PG (ref 27–31)
MCH RBC QN AUTO: 28.4 PG (ref 27–31)
MCH RBC QN AUTO: 28.5 PG (ref 27–31)
MCH RBC QN AUTO: 28.6 PG (ref 27–31)
MCH RBC QN AUTO: 28.6 PG (ref 27–31)
MCH RBC QN AUTO: 28.7 PG (ref 27–31)
MCH RBC QN AUTO: 28.8 PG (ref 27–31)
MCH RBC QN AUTO: 28.9 PG (ref 27–31)
MCH RBC QN AUTO: 29 PG (ref 27–31)
MCH RBC QN AUTO: 29.1 PG (ref 27–31)
MCH RBC QN AUTO: 29.1 PG (ref 27–31)
MCH RBC QN AUTO: 29.2 PG (ref 27–31)
MCH RBC QN AUTO: 29.2 PG (ref 27–31)
MCHC RBC AUTO-ENTMCNC: 29.9 G/DL (ref 32–36)
MCHC RBC AUTO-ENTMCNC: 30.5 G/DL (ref 32–36)
MCHC RBC AUTO-ENTMCNC: 30.5 G/DL (ref 32–36)
MCHC RBC AUTO-ENTMCNC: 30.6 G/DL (ref 32–36)
MCHC RBC AUTO-ENTMCNC: 30.9 G/DL (ref 32–36)
MCHC RBC AUTO-ENTMCNC: 31 G/DL (ref 32–36)
MCHC RBC AUTO-ENTMCNC: 31.1 G/DL (ref 32–36)
MCHC RBC AUTO-ENTMCNC: 31.1 G/DL (ref 32–36)
MCHC RBC AUTO-ENTMCNC: 31.2 G/DL (ref 32–36)
MCHC RBC AUTO-ENTMCNC: 31.3 G/DL (ref 32–36)
MCHC RBC AUTO-ENTMCNC: 31.4 G/DL (ref 32–36)
MCHC RBC AUTO-ENTMCNC: 31.5 G/DL (ref 32–36)
MCHC RBC AUTO-ENTMCNC: 31.6 G/DL (ref 32–36)
MCHC RBC AUTO-ENTMCNC: 31.7 G/DL (ref 32–36)
MCHC RBC AUTO-ENTMCNC: 31.8 G/DL (ref 32–36)
MCHC RBC AUTO-ENTMCNC: 31.9 G/DL (ref 32–36)
MCHC RBC AUTO-ENTMCNC: 31.9 G/DL (ref 32–36)
MCHC RBC AUTO-ENTMCNC: 32 G/DL (ref 32–36)
MCHC RBC AUTO-ENTMCNC: 32.1 G/DL (ref 32–36)
MCHC RBC AUTO-ENTMCNC: 32.2 G/DL (ref 32–36)
MCHC RBC AUTO-ENTMCNC: 32.3 G/DL (ref 32–36)
MCHC RBC AUTO-ENTMCNC: 32.5 G/DL (ref 32–36)
MCHC RBC AUTO-ENTMCNC: 32.5 G/DL (ref 32–36)
MCHC RBC AUTO-ENTMCNC: 32.6 G/DL (ref 32–36)
MCHC RBC AUTO-ENTMCNC: 32.7 G/DL (ref 32–36)
MCHC RBC AUTO-ENTMCNC: 32.8 G/DL (ref 32–36)
MCHC RBC AUTO-ENTMCNC: 32.8 G/DL (ref 32–36)
MCHC RBC AUTO-ENTMCNC: 32.9 G/DL (ref 32–36)
MCHC RBC AUTO-ENTMCNC: 32.9 G/DL (ref 32–36)
MCHC RBC AUTO-ENTMCNC: 33.2 G/DL (ref 32–36)
MCHC RBC AUTO-ENTMCNC: 33.5 G/DL (ref 32–36)
MCHC RBC AUTO-ENTMCNC: 33.6 G/DL (ref 32–36)
MCV RBC AUTO: 84 FL (ref 82–98)
MCV RBC AUTO: 85 FL (ref 82–98)
MCV RBC AUTO: 86 FL (ref 82–98)
MCV RBC AUTO: 87 FL (ref 82–98)
MCV RBC AUTO: 88 FL (ref 82–98)
MCV RBC AUTO: 89 FL (ref 82–98)
MCV RBC AUTO: 90 FL (ref 82–98)
MCV RBC AUTO: 91 FL (ref 82–98)
MCV RBC AUTO: 92 FL (ref 82–98)
MCV RBC AUTO: 93 FL (ref 82–98)
MCV RBC AUTO: 93 FL (ref 82–98)
MCV RBC AUTO: 94 FL (ref 82–98)
MCV RBC AUTO: 94 FL (ref 82–98)
METHADONE SERPL QL SCN: NEGATIVE
METHYLMALONATE SERPL-SCNC: 0.5 UMOL/L
MICROSCOPIC COMMENT: ABNORMAL
MICROSCOPIC COMMENT: NORMAL
MICROSCOPIC COMMENT: NORMAL
MIN VOL: 14.4
MITOGEN IGNF BCKGRD COR BLD-ACNC: 9.95 IU/ML
MIXING DRVVT: NORMAL SEC (ref 33–44)
MODE: ABNORMAL
MODE: NORMAL
MONOCYTES # BLD AUTO: 0.2 K/UL (ref 0.3–1)
MONOCYTES # BLD AUTO: 0.3 K/UL (ref 0.3–1)
MONOCYTES # BLD AUTO: 0.4 K/UL (ref 0.3–1)
MONOCYTES # BLD AUTO: 0.5 K/UL (ref 0.3–1)
MONOCYTES # BLD AUTO: 0.6 K/UL (ref 0.3–1)
MONOCYTES # BLD AUTO: 0.7 K/UL (ref 0.3–1)
MONOCYTES # BLD AUTO: 0.8 K/UL (ref 0.3–1)
MONOCYTES # BLD AUTO: 0.9 K/UL (ref 0.3–1)
MONOCYTES # BLD AUTO: 0.9 K/UL (ref 0.3–1)
MONOCYTES # BLD AUTO: 1 K/UL (ref 0.3–1)
MONOCYTES # BLD AUTO: 1.1 K/UL (ref 0.3–1)
MONOCYTES # BLD AUTO: 1.3 K/UL (ref 0.3–1)
MONOCYTES NFR BLD: 1.7 % (ref 4–15)
MONOCYTES NFR BLD: 10.3 % (ref 4–15)
MONOCYTES NFR BLD: 10.4 % (ref 4–15)
MONOCYTES NFR BLD: 3.6 % (ref 4–15)
MONOCYTES NFR BLD: 3.7 % (ref 4–15)
MONOCYTES NFR BLD: 4.7 % (ref 4–15)
MONOCYTES NFR BLD: 4.7 % (ref 4–15)
MONOCYTES NFR BLD: 5 % (ref 4–15)
MONOCYTES NFR BLD: 5.1 % (ref 4–15)
MONOCYTES NFR BLD: 5.2 % (ref 4–15)
MONOCYTES NFR BLD: 5.3 % (ref 4–15)
MONOCYTES NFR BLD: 5.4 % (ref 4–15)
MONOCYTES NFR BLD: 6.1 % (ref 4–15)
MONOCYTES NFR BLD: 6.3 % (ref 4–15)
MONOCYTES NFR BLD: 6.4 % (ref 4–15)
MONOCYTES NFR BLD: 6.5 % (ref 4–15)
MONOCYTES NFR BLD: 6.5 % (ref 4–15)
MONOCYTES NFR BLD: 6.6 % (ref 4–15)
MONOCYTES NFR BLD: 6.6 % (ref 4–15)
MONOCYTES NFR BLD: 6.7 % (ref 4–15)
MONOCYTES NFR BLD: 6.8 % (ref 4–15)
MONOCYTES NFR BLD: 6.9 % (ref 4–15)
MONOCYTES NFR BLD: 6.9 % (ref 4–15)
MONOCYTES NFR BLD: 7 % (ref 4–15)
MONOCYTES NFR BLD: 7 % (ref 4–15)
MONOCYTES NFR BLD: 7.1 % (ref 4–15)
MONOCYTES NFR BLD: 7.2 % (ref 4–15)
MONOCYTES NFR BLD: 7.3 % (ref 4–15)
MONOCYTES NFR BLD: 7.3 % (ref 4–15)
MONOCYTES NFR BLD: 7.4 % (ref 4–15)
MONOCYTES NFR BLD: 7.4 % (ref 4–15)
MONOCYTES NFR BLD: 7.5 % (ref 4–15)
MONOCYTES NFR BLD: 7.6 % (ref 4–15)
MONOCYTES NFR BLD: 7.7 % (ref 4–15)
MONOCYTES NFR BLD: 7.7 % (ref 4–15)
MONOCYTES NFR BLD: 7.8 % (ref 4–15)
MONOCYTES NFR BLD: 7.9 % (ref 4–15)
MONOCYTES NFR BLD: 8.1 % (ref 4–15)
MONOCYTES NFR BLD: 8.1 % (ref 4–15)
MONOCYTES NFR BLD: 8.3 % (ref 4–15)
MONOCYTES NFR BLD: 8.4 % (ref 4–15)
MONOCYTES NFR BLD: 8.4 % (ref 4–15)
MONOCYTES NFR BLD: 8.7 % (ref 4–15)
MONOCYTES NFR BLD: 8.7 % (ref 4–15)
MONOCYTES NFR BLD: 8.8 % (ref 4–15)
MONOCYTES NFR BLD: 8.8 % (ref 4–15)
MONOCYTES NFR BLD: 8.9 % (ref 4–15)
MONOCYTES NFR BLD: 9 % (ref 4–15)
MONOCYTES NFR BLD: 9.1 % (ref 4–15)
MONOCYTES NFR BLD: 9.1 % (ref 4–15)
MONOCYTES NFR BLD: 9.2 % (ref 4–15)
MONOCYTES NFR BLD: 9.2 % (ref 4–15)
MONOCYTES NFR BLD: 9.3 % (ref 4–15)
MONOCYTES NFR BLD: 9.4 % (ref 4–15)
MONOCYTES NFR BLD: 9.4 % (ref 4–15)
MONOCYTES NFR BLD: 9.5 % (ref 4–15)
MONOCYTES NFR BLD: 9.5 % (ref 4–15)
MRSA ID BY PCR: POSITIVE
MV A" WAVE DURATION": 11.8 MSEC
MV A" WAVE DURATION": 17.98 MSEC
MV PEAK A VEL: 0.83 M/S
MV PEAK A VEL: 0.88 M/S
MV PEAK A VEL: 0.91 M/S
MV PEAK E VEL: 0.39 M/S
MV PEAK E VEL: 0.44 M/S
MV PEAK E VEL: 0.49 M/S
MV STENOSIS PRESSURE HALF TIME: 26.49 MS
MV STENOSIS PRESSURE HALF TIME: 32.66 MS
MV STENOSIS PRESSURE HALF TIME: 67.69 MS
MV VALVE AREA P 1/2 METHOD: 3.25 CM2
MV VALVE AREA P 1/2 METHOD: 6.74 CM2
MV VALVE AREA P 1/2 METHOD: 8.31 CM2
MYCOPLASMA PNEUMONIAE: NOT DETECTED
NEUTROPHILS # BLD AUTO: 12.1 K/UL (ref 1.8–7.7)
NEUTROPHILS # BLD AUTO: 12.4 K/UL (ref 1.8–7.7)
NEUTROPHILS # BLD AUTO: 12.8 K/UL (ref 1.8–7.7)
NEUTROPHILS # BLD AUTO: 12.9 K/UL (ref 1.8–7.7)
NEUTROPHILS # BLD AUTO: 13.1 K/UL (ref 1.8–7.7)
NEUTROPHILS # BLD AUTO: 13.3 K/UL (ref 1.8–7.7)
NEUTROPHILS # BLD AUTO: 14.4 K/UL (ref 1.8–7.7)
NEUTROPHILS # BLD AUTO: 15.2 K/UL (ref 1.8–7.7)
NEUTROPHILS # BLD AUTO: 2.5 K/UL (ref 1.8–7.7)
NEUTROPHILS # BLD AUTO: 2.5 K/UL (ref 1.8–7.7)
NEUTROPHILS # BLD AUTO: 2.6 K/UL (ref 1.8–7.7)
NEUTROPHILS # BLD AUTO: 2.7 K/UL (ref 1.8–7.7)
NEUTROPHILS # BLD AUTO: 2.8 K/UL (ref 1.8–7.7)
NEUTROPHILS # BLD AUTO: 2.9 K/UL (ref 1.8–7.7)
NEUTROPHILS # BLD AUTO: 3.1 K/UL (ref 1.8–7.7)
NEUTROPHILS # BLD AUTO: 3.3 K/UL (ref 1.8–7.7)
NEUTROPHILS # BLD AUTO: 3.4 K/UL (ref 1.8–7.7)
NEUTROPHILS # BLD AUTO: 3.4 K/UL (ref 1.8–7.7)
NEUTROPHILS # BLD AUTO: 3.5 K/UL (ref 1.8–7.7)
NEUTROPHILS # BLD AUTO: 3.6 K/UL (ref 1.8–7.7)
NEUTROPHILS # BLD AUTO: 3.6 K/UL (ref 1.8–7.7)
NEUTROPHILS # BLD AUTO: 3.7 K/UL (ref 1.8–7.7)
NEUTROPHILS # BLD AUTO: 3.8 K/UL (ref 1.8–7.7)
NEUTROPHILS # BLD AUTO: 3.9 K/UL (ref 1.8–7.7)
NEUTROPHILS # BLD AUTO: 4 K/UL (ref 1.8–7.7)
NEUTROPHILS # BLD AUTO: 4 K/UL (ref 1.8–7.7)
NEUTROPHILS # BLD AUTO: 4.1 K/UL (ref 1.8–7.7)
NEUTROPHILS # BLD AUTO: 4.3 K/UL (ref 1.8–7.7)
NEUTROPHILS # BLD AUTO: 4.4 K/UL (ref 1.8–7.7)
NEUTROPHILS # BLD AUTO: 4.6 K/UL (ref 1.8–7.7)
NEUTROPHILS # BLD AUTO: 4.6 K/UL (ref 1.8–7.7)
NEUTROPHILS # BLD AUTO: 4.7 K/UL (ref 1.8–7.7)
NEUTROPHILS # BLD AUTO: 4.7 K/UL (ref 1.8–7.7)
NEUTROPHILS # BLD AUTO: 4.9 K/UL (ref 1.8–7.7)
NEUTROPHILS # BLD AUTO: 5 K/UL (ref 1.8–7.7)
NEUTROPHILS # BLD AUTO: 5 K/UL (ref 1.8–7.7)
NEUTROPHILS # BLD AUTO: 5.2 K/UL (ref 1.8–7.7)
NEUTROPHILS # BLD AUTO: 5.3 K/UL (ref 1.8–7.7)
NEUTROPHILS # BLD AUTO: 5.4 K/UL (ref 1.8–7.7)
NEUTROPHILS # BLD AUTO: 5.5 K/UL (ref 1.8–7.7)
NEUTROPHILS # BLD AUTO: 5.6 K/UL (ref 1.8–7.7)
NEUTROPHILS # BLD AUTO: 5.6 K/UL (ref 1.8–7.7)
NEUTROPHILS # BLD AUTO: 5.9 K/UL (ref 1.8–7.7)
NEUTROPHILS # BLD AUTO: 6 K/UL (ref 1.8–7.7)
NEUTROPHILS # BLD AUTO: 6 K/UL (ref 1.8–7.7)
NEUTROPHILS # BLD AUTO: 6.3 K/UL (ref 1.8–7.7)
NEUTROPHILS # BLD AUTO: 6.5 K/UL (ref 1.8–7.7)
NEUTROPHILS # BLD AUTO: 6.6 K/UL (ref 1.8–7.7)
NEUTROPHILS # BLD AUTO: 7.5 K/UL (ref 1.8–7.7)
NEUTROPHILS # BLD AUTO: 7.7 K/UL (ref 1.8–7.7)
NEUTROPHILS # BLD AUTO: 8.2 K/UL (ref 1.8–7.7)
NEUTROPHILS # BLD AUTO: 8.2 K/UL (ref 1.8–7.7)
NEUTROPHILS # BLD AUTO: 8.4 K/UL (ref 1.8–7.7)
NEUTROPHILS # BLD AUTO: 9.2 K/UL (ref 1.8–7.7)
NEUTROPHILS # BLD AUTO: 9.3 K/UL (ref 1.8–7.7)
NEUTROPHILS NFR BLD: 44.6 % (ref 38–73)
NEUTROPHILS NFR BLD: 50.8 % (ref 38–73)
NEUTROPHILS NFR BLD: 54.3 % (ref 38–73)
NEUTROPHILS NFR BLD: 54.6 % (ref 38–73)
NEUTROPHILS NFR BLD: 56.3 % (ref 38–73)
NEUTROPHILS NFR BLD: 57.1 % (ref 38–73)
NEUTROPHILS NFR BLD: 57.1 % (ref 38–73)
NEUTROPHILS NFR BLD: 57.4 % (ref 38–73)
NEUTROPHILS NFR BLD: 57.4 % (ref 38–73)
NEUTROPHILS NFR BLD: 57.9 % (ref 38–73)
NEUTROPHILS NFR BLD: 58.1 % (ref 38–73)
NEUTROPHILS NFR BLD: 58.3 % (ref 38–73)
NEUTROPHILS NFR BLD: 58.9 % (ref 38–73)
NEUTROPHILS NFR BLD: 59.1 % (ref 38–73)
NEUTROPHILS NFR BLD: 59.3 % (ref 38–73)
NEUTROPHILS NFR BLD: 59.5 % (ref 38–73)
NEUTROPHILS NFR BLD: 59.7 % (ref 38–73)
NEUTROPHILS NFR BLD: 59.8 % (ref 38–73)
NEUTROPHILS NFR BLD: 59.8 % (ref 38–73)
NEUTROPHILS NFR BLD: 60.2 % (ref 38–73)
NEUTROPHILS NFR BLD: 61.4 % (ref 38–73)
NEUTROPHILS NFR BLD: 62.3 % (ref 38–73)
NEUTROPHILS NFR BLD: 62.5 % (ref 38–73)
NEUTROPHILS NFR BLD: 62.8 % (ref 38–73)
NEUTROPHILS NFR BLD: 62.8 % (ref 38–73)
NEUTROPHILS NFR BLD: 63.3 % (ref 38–73)
NEUTROPHILS NFR BLD: 63.4 % (ref 38–73)
NEUTROPHILS NFR BLD: 63.5 % (ref 38–73)
NEUTROPHILS NFR BLD: 63.9 % (ref 38–73)
NEUTROPHILS NFR BLD: 64.1 % (ref 38–73)
NEUTROPHILS NFR BLD: 64.2 % (ref 38–73)
NEUTROPHILS NFR BLD: 65 % (ref 38–73)
NEUTROPHILS NFR BLD: 65.3 % (ref 38–73)
NEUTROPHILS NFR BLD: 65.9 % (ref 38–73)
NEUTROPHILS NFR BLD: 66.9 % (ref 38–73)
NEUTROPHILS NFR BLD: 67.1 % (ref 38–73)
NEUTROPHILS NFR BLD: 67.3 % (ref 38–73)
NEUTROPHILS NFR BLD: 67.9 % (ref 38–73)
NEUTROPHILS NFR BLD: 67.9 % (ref 38–73)
NEUTROPHILS NFR BLD: 68.7 % (ref 38–73)
NEUTROPHILS NFR BLD: 68.8 % (ref 38–73)
NEUTROPHILS NFR BLD: 68.8 % (ref 38–73)
NEUTROPHILS NFR BLD: 69.6 % (ref 38–73)
NEUTROPHILS NFR BLD: 70.6 % (ref 38–73)
NEUTROPHILS NFR BLD: 71.1 % (ref 38–73)
NEUTROPHILS NFR BLD: 71.2 % (ref 38–73)
NEUTROPHILS NFR BLD: 72.3 % (ref 38–73)
NEUTROPHILS NFR BLD: 72.6 % (ref 38–73)
NEUTROPHILS NFR BLD: 72.9 % (ref 38–73)
NEUTROPHILS NFR BLD: 73.5 % (ref 38–73)
NEUTROPHILS NFR BLD: 73.8 % (ref 38–73)
NEUTROPHILS NFR BLD: 75.3 % (ref 38–73)
NEUTROPHILS NFR BLD: 77.8 % (ref 38–73)
NEUTROPHILS NFR BLD: 78.5 % (ref 38–73)
NEUTROPHILS NFR BLD: 80.1 % (ref 38–73)
NEUTROPHILS NFR BLD: 82.2 % (ref 38–73)
NEUTROPHILS NFR BLD: 83.3 % (ref 38–73)
NEUTROPHILS NFR BLD: 84.3 % (ref 38–73)
NEUTROPHILS NFR BLD: 84.4 % (ref 38–73)
NEUTROPHILS NFR BLD: 85 % (ref 38–73)
NEUTROPHILS NFR BLD: 85.2 % (ref 38–73)
NEUTROPHILS NFR BLD: 85.3 % (ref 38–73)
NEUTROPHILS NFR BLD: 85.5 % (ref 38–73)
NEUTROPHILS NFR BLD: 86.8 % (ref 38–73)
NEUTROPHILS NFR BLD: 87.3 % (ref 38–73)
NEUTROPHILS NFR BLD: 88.3 % (ref 38–73)
NEUTROPHILS NFR BLD: 91.2 % (ref 38–73)
NEUTROPHILS NFR BLD: 92.9 % (ref 38–73)
NICOTINE SERPL-MCNC: <3 NG/ML
NITRITE UR QL STRIP: NEGATIVE
NITRITE UR QL STRIP: POSITIVE
NONHDLC SERPL-MCNC: 95 MG/DL
NRBC BLD-RTO: 0 /100 WBC
NT-PROBNP SERPL IA-MCNC: 1084 PG/ML
NT-PROBNP SERPL-MCNC: 1938 PG/ML
OHS CV CAROTID RIGHT ICA EDV HIGHEST: 20
OHS CV CAROTID ULTRASOUND LEFT ICA/CCA RATIO: 0.7
OHS CV CAROTID ULTRASOUND RIGHT ICA/CCA RATIO: 0.67
OHS CV CPX 1 MINUTE RECOVERY HEART RATE: 98 BPM
OHS CV CPX 85 PERCENT MAX PREDICTED HEART RATE MALE: 128
OHS CV CPX DATA GRADE - PEAK: 2.5
OHS CV CPX DATA O2 SAT - PEAK: 98
OHS CV CPX DATA O2 SAT - REST: 98
OHS CV CPX DATA SPEED - PEAK: 2.2
OHS CV CPX DATA TIME - PEAK: 3.85
OHS CV CPX DATA VE/VCO2 - PEAK: 51
OHS CV CPX DATA VE/VO2 - PEAK: 48
OHS CV CPX DATA VO2 - PEAK: 12.6
OHS CV CPX DATA VO2 - REST: 4
OHS CV CPX FEV1/FVC: 0.97
OHS CV CPX FORCED EXPIRATORY VOLUME: 2.26
OHS CV CPX FORCED VITAL CAPACITY (FVC): 2.34
OHS CV CPX HIGHEST VO: 29.4
OHS CV CPX MAX PREDICTED HEART RATE: 150
OHS CV CPX MAXIMAL VOLUNTARY VENTILATION (MVV) PREDICTED: 90.4
OHS CV CPX MAXIMAL VOLUNTARY VENTILATION (MVV): 54
OHS CV CPX MAXIUMUM EXERCISE VENTILATION (VE MAX): 57.6
OHS CV CPX PATIENT AGE: 70
OHS CV CPX PATIENT HEIGHT IN: 67
OHS CV CPX PATIENT IS FEMALE AGE 11-19: 0
OHS CV CPX PATIENT IS FEMALE AGE GREATER THAN 19: 0
OHS CV CPX PATIENT IS FEMALE AGE LESS THAN 11: 0
OHS CV CPX PATIENT IS FEMALE: 0
OHS CV CPX PATIENT IS MALE AGE 11-25: 0
OHS CV CPX PATIENT IS MALE AGE GREATER THAN 25: 1
OHS CV CPX PATIENT IS MALE AGE LESS THAN 11: 0
OHS CV CPX PATIENT IS MALE GREATER THAN 18: 1
OHS CV CPX PATIENT IS MALE LESS THAN OR EQUAL TO 18: 0
OHS CV CPX PATIENT IS MALE: 1
OHS CV CPX PATIENT WEIGHT RETURNED IN OZ: 3424
OHS CV CPX PEAK DIASTOLIC BLOOD PRESSURE: 78 MMHG
OHS CV CPX PEAK HEAR RATE: 103 BPM
OHS CV CPX PEAK RATE PRESSURE PRODUCT: NORMAL
OHS CV CPX PEAK SYSTOLIC BLOOD PRESSURE: 110 MMHG
OHS CV CPX PERCENT BODY FAT: 27
OHS CV CPX PERCENT MAX PREDICTED HEART RATE ACHIEVED: 69
OHS CV CPX PREDICTED VO2: 29.4 ML/KG/MIN
OHS CV CPX RATE PRESSURE PRODUCT PRESENTING: 8880
OHS CV CPX REST PET CO2: 25
OHS CV CPX VE/VCO2 SLOPE: 45
OHS CV PV CAROTID LEFT HIGHEST CCA: 136
OHS CV PV CAROTID LEFT HIGHEST ICA: 65
OHS CV PV CAROTID RIGHT HIGHEST CCA: 85
OHS CV PV CAROTID RIGHT HIGHEST ICA: 57
OHS CV US CAROTID LEFT HIGHEST EDV: 27
OPIATES SERPL QL SCN: NEGATIVE
OVALOCYTES BLD QL SMEAR: ABNORMAL
PCO2 BLDA: 24.1 MMHG (ref 35–45)
PCO2 BLDA: 30.5 MMHG (ref 35–45)
PCO2 BLDA: 31.3 MMHG (ref 35–45)
PCO2 BLDA: 31.3 MMHG (ref 35–45)
PCO2 BLDA: 31.8 MMHG (ref 35–45)
PCO2 BLDA: 32.8 MMHG (ref 35–45)
PCO2 BLDA: 34.3 MMHG (ref 35–45)
PCO2 BLDA: 35.5 MMHG (ref 35–45)
PCO2 BLDA: 37 MMHG (ref 35–45)
PCO2 BLDA: 37.6 MMHG (ref 35–45)
PCO2 BLDA: 39.1 MMHG (ref 35–45)
PCO2 BLDA: 41.6 MMHG (ref 35–45)
PCO2 BLDA: 42.1 MMHG (ref 35–45)
PCO2 BLDA: 43.1 MMHG (ref 35–45)
PCO2 BLDA: 43.3 MMHG (ref 35–45)
PCO2 BLDA: 44.9 MMHG (ref 35–45)
PCO2 BLDA: 45 MMHG (ref 35–45)
PCO2 BLDA: 45.3 MMHG (ref 35–45)
PCO2 BLDA: 45.4 MMHG (ref 35–45)
PCO2 BLDA: 45.7 MMHG (ref 35–45)
PCO2 BLDA: 45.7 MMHG (ref 35–45)
PCO2 BLDA: 46.1 MMHG (ref 35–45)
PCO2 BLDA: 46.4 MMHG (ref 35–45)
PCO2 BLDA: 46.5 MMHG (ref 35–45)
PCO2 BLDA: 46.9 MMHG (ref 35–45)
PCO2 BLDA: 47.1 MMHG (ref 35–45)
PCO2 BLDA: 47.3 MMHG (ref 35–45)
PCO2 BLDA: 47.7 MMHG (ref 35–45)
PCO2 BLDA: 48 MMHG (ref 35–45)
PCO2 BLDA: 48.5 MMHG (ref 35–45)
PCO2 BLDA: 48.5 MMHG (ref 35–45)
PCO2 BLDA: 48.8 MMHG (ref 35–45)
PCO2 BLDA: 48.8 MMHG (ref 35–45)
PCO2 BLDA: 49.1 MMHG (ref 35–45)
PCO2 BLDA: 49.2 MMHG (ref 35–45)
PCO2 BLDA: 49.4 MMHG (ref 35–45)
PCO2 BLDA: 49.4 MMHG (ref 35–45)
PCO2 BLDA: 49.7 MMHG (ref 35–45)
PCO2 BLDA: 50.2 MMHG (ref 35–45)
PCO2 BLDA: 50.7 MMHG (ref 35–45)
PCO2 BLDA: 51.8 MMHG (ref 35–45)
PCO2 BLDA: 51.8 MMHG (ref 35–45)
PCO2 BLDA: 52.2 MMHG (ref 35–45)
PCO2 BLDA: 52.9 MMHG (ref 35–45)
PCO2 BLDA: 53.5 MMHG (ref 35–45)
PCO2 BLDA: 53.8 MMHG (ref 35–45)
PCO2 BLDA: 54 MMHG (ref 35–45)
PCO2 BLDA: 54.7 MMHG (ref 35–45)
PCO2 BLDA: 55.2 MMHG (ref 35–45)
PCO2 BLDA: 60.7 MMHG (ref 35–45)
PCP SERPL QL SCN: NEGATIVE
PEEP: 10
PEEP: 7
PH SMN: 7.22 [PH] (ref 7.35–7.45)
PH SMN: 7.25 [PH] (ref 7.35–7.45)
PH SMN: 7.3 [PH] (ref 7.35–7.45)
PH SMN: 7.31 [PH] (ref 7.35–7.45)
PH SMN: 7.32 [PH] (ref 7.35–7.45)
PH SMN: 7.33 [PH] (ref 7.35–7.45)
PH SMN: 7.33 [PH] (ref 7.35–7.45)
PH SMN: 7.34 [PH] (ref 7.35–7.45)
PH SMN: 7.35 [PH] (ref 7.35–7.45)
PH SMN: 7.36 [PH] (ref 7.35–7.45)
PH SMN: 7.36 [PH] (ref 7.35–7.45)
PH SMN: 7.38 [PH] (ref 7.35–7.45)
PH SMN: 7.39 [PH] (ref 7.35–7.45)
PH SMN: 7.4 [PH] (ref 7.35–7.45)
PH SMN: 7.41 [PH] (ref 7.35–7.45)
PH SMN: 7.42 [PH] (ref 7.35–7.45)
PH SMN: 7.43 [PH] (ref 7.35–7.45)
PH SMN: 7.44 [PH] (ref 7.35–7.45)
PH SMN: 7.44 [PH] (ref 7.35–7.45)
PH SMN: 7.45 [PH] (ref 7.35–7.45)
PH SMN: 7.46 [PH] (ref 7.35–7.45)
PH SMN: 7.47 [PH] (ref 7.35–7.45)
PH SMN: 7.47 [PH] (ref 7.35–7.45)
PH SMN: 7.48 [PH] (ref 7.35–7.45)
PH SMN: 7.49 [PH] (ref 7.35–7.45)
PH SMN: 7.53 [PH] (ref 7.35–7.45)
PH SMN: 7.54 [PH] (ref 7.35–7.45)
PH SMN: 7.62 [PH] (ref 7.35–7.45)
PH UR STRIP: 5 [PH] (ref 5–8)
PH UR STRIP: 6 [PH] (ref 5–8)
PH UR STRIP: 7 [PH] (ref 5–8)
PHOSPHATE SERPL-MCNC: 3.8 MG/DL (ref 2.7–4.5)
PHOSPHATE SERPL-MCNC: 4 MG/DL (ref 2.7–4.5)
PHOSPHATE SERPL-MCNC: 4.1 MG/DL (ref 2.7–4.5)
PHOSPHATE SERPL-MCNC: 4.1 MG/DL (ref 2.7–4.5)
PHOSPHATE SERPL-MCNC: 4.2 MG/DL (ref 2.7–4.5)
PHOSPHATE SERPL-MCNC: 4.2 MG/DL (ref 2.7–4.5)
PHOSPHATE SERPL-MCNC: 4.5 MG/DL (ref 2.7–4.5)
PHOSPHATE SERPL-MCNC: 4.5 MG/DL (ref 2.7–4.5)
PHOSPHATE SERPL-MCNC: 4.6 MG/DL (ref 2.7–4.5)
PHOSPHATE SERPL-MCNC: 5.1 MG/DL (ref 2.7–4.5)
PIP: 27
PISA TR MAX VEL: 2.36 M/S
PLATELET # BLD AUTO: 114 K/UL (ref 150–450)
PLATELET # BLD AUTO: 120 K/UL (ref 150–450)
PLATELET # BLD AUTO: 124 K/UL (ref 150–450)
PLATELET # BLD AUTO: 142 K/UL (ref 150–450)
PLATELET # BLD AUTO: 162 K/UL (ref 150–450)
PLATELET # BLD AUTO: 168 K/UL (ref 150–450)
PLATELET # BLD AUTO: 171 K/UL (ref 150–450)
PLATELET # BLD AUTO: 177 K/UL (ref 150–450)
PLATELET # BLD AUTO: 191 K/UL (ref 150–450)
PLATELET # BLD AUTO: 192 K/UL (ref 150–450)
PLATELET # BLD AUTO: 203 K/UL (ref 150–450)
PLATELET # BLD AUTO: 206 K/UL (ref 150–450)
PLATELET # BLD AUTO: 207 K/UL (ref 150–450)
PLATELET # BLD AUTO: 210 K/UL (ref 150–450)
PLATELET # BLD AUTO: 213 K/UL (ref 150–450)
PLATELET # BLD AUTO: 222 K/UL (ref 150–450)
PLATELET # BLD AUTO: 225 K/UL (ref 150–450)
PLATELET # BLD AUTO: 228 K/UL (ref 150–450)
PLATELET # BLD AUTO: 228 K/UL (ref 150–450)
PLATELET # BLD AUTO: 231 K/UL (ref 150–450)
PLATELET # BLD AUTO: 235 K/UL (ref 150–450)
PLATELET # BLD AUTO: 235 K/UL (ref 150–450)
PLATELET # BLD AUTO: 240 K/UL (ref 150–450)
PLATELET # BLD AUTO: 240 K/UL (ref 150–450)
PLATELET # BLD AUTO: 242 K/UL (ref 150–450)
PLATELET # BLD AUTO: 253 K/UL (ref 150–450)
PLATELET # BLD AUTO: 254 K/UL (ref 150–450)
PLATELET # BLD AUTO: 254 K/UL (ref 150–450)
PLATELET # BLD AUTO: 257 K/UL (ref 150–450)
PLATELET # BLD AUTO: 262 K/UL (ref 150–450)
PLATELET # BLD AUTO: 263 K/UL (ref 150–450)
PLATELET # BLD AUTO: 264 K/UL (ref 150–450)
PLATELET # BLD AUTO: 264 K/UL (ref 150–450)
PLATELET # BLD AUTO: 270 K/UL (ref 150–450)
PLATELET # BLD AUTO: 270 K/UL (ref 150–450)
PLATELET # BLD AUTO: 271 K/UL (ref 150–450)
PLATELET # BLD AUTO: 275 K/UL (ref 150–450)
PLATELET # BLD AUTO: 276 K/UL (ref 150–450)
PLATELET # BLD AUTO: 277 K/UL (ref 150–450)
PLATELET # BLD AUTO: 279 K/UL (ref 150–450)
PLATELET # BLD AUTO: 281 K/UL (ref 150–450)
PLATELET # BLD AUTO: 282 K/UL (ref 150–450)
PLATELET # BLD AUTO: 288 K/UL (ref 150–450)
PLATELET # BLD AUTO: 289 K/UL (ref 150–450)
PLATELET # BLD AUTO: 291 K/UL (ref 150–450)
PLATELET # BLD AUTO: 293 K/UL (ref 150–450)
PLATELET # BLD AUTO: 293 K/UL (ref 150–450)
PLATELET # BLD AUTO: 303 K/UL (ref 150–450)
PLATELET # BLD AUTO: 304 K/UL (ref 150–450)
PLATELET # BLD AUTO: 306 K/UL (ref 150–450)
PLATELET # BLD AUTO: 306 K/UL (ref 150–450)
PLATELET # BLD AUTO: 312 K/UL (ref 150–450)
PLATELET # BLD AUTO: 313 K/UL (ref 150–450)
PLATELET # BLD AUTO: 320 K/UL (ref 150–450)
PLATELET # BLD AUTO: 320 K/UL (ref 150–450)
PLATELET # BLD AUTO: 328 K/UL (ref 150–450)
PLATELET # BLD AUTO: 337 K/UL (ref 150–450)
PLATELET # BLD AUTO: 339 K/UL (ref 150–450)
PLATELET # BLD AUTO: 343 K/UL (ref 150–450)
PLATELET # BLD AUTO: 368 K/UL (ref 150–450)
PLATELET # BLD AUTO: 374 K/UL (ref 150–450)
PLATELET # BLD AUTO: 376 K/UL (ref 150–450)
PLATELET # BLD AUTO: 404 K/UL (ref 150–450)
PLATELET # BLD AUTO: 407 K/UL (ref 150–450)
PLATELET # BLD AUTO: 410 K/UL (ref 150–450)
PLATELET # BLD AUTO: 426 K/UL (ref 150–450)
PLATELET # BLD AUTO: 442 K/UL (ref 150–450)
PLATELET # BLD AUTO: 452 K/UL (ref 150–450)
PLATELET # BLD AUTO: 474 K/UL (ref 150–450)
PLATELET # BLD AUTO: ABNORMAL K/UL (ref 150–450)
PLATELET BLD QL SMEAR: ABNORMAL
PMV BLD AUTO: 10 FL (ref 9.2–12.9)
PMV BLD AUTO: 10.1 FL (ref 9.2–12.9)
PMV BLD AUTO: 10.2 FL (ref 9.2–12.9)
PMV BLD AUTO: 10.2 FL (ref 9.2–12.9)
PMV BLD AUTO: 10.3 FL (ref 9.2–12.9)
PMV BLD AUTO: 10.4 FL (ref 9.2–12.9)
PMV BLD AUTO: 10.4 FL (ref 9.2–12.9)
PMV BLD AUTO: 10.5 FL (ref 9.2–12.9)
PMV BLD AUTO: 10.6 FL (ref 9.2–12.9)
PMV BLD AUTO: 10.7 FL (ref 9.2–12.9)
PMV BLD AUTO: 10.8 FL (ref 9.2–12.9)
PMV BLD AUTO: 10.9 FL (ref 9.2–12.9)
PMV BLD AUTO: 11 FL (ref 9.2–12.9)
PMV BLD AUTO: 11.1 FL (ref 9.2–12.9)
PMV BLD AUTO: 11.1 FL (ref 9.2–12.9)
PMV BLD AUTO: 11.2 FL (ref 9.2–12.9)
PMV BLD AUTO: 11.3 FL (ref 9.2–12.9)
PMV BLD AUTO: 11.4 FL (ref 9.2–12.9)
PMV BLD AUTO: 11.5 FL (ref 9.2–12.9)
PMV BLD AUTO: 11.6 FL (ref 9.2–12.9)
PMV BLD AUTO: 11.6 FL (ref 9.2–12.9)
PMV BLD AUTO: 11.7 FL (ref 9.2–12.9)
PMV BLD AUTO: 11.7 FL (ref 9.2–12.9)
PMV BLD AUTO: 11.8 FL (ref 9.2–12.9)
PMV BLD AUTO: 11.9 FL (ref 9.2–12.9)
PMV BLD AUTO: 12.1 FL (ref 9.2–12.9)
PMV BLD AUTO: 12.4 FL (ref 9.2–12.9)
PMV BLD AUTO: 9.5 FL (ref 9.2–12.9)
PMV BLD AUTO: 9.7 FL (ref 9.2–12.9)
PMV BLD AUTO: 9.7 FL (ref 9.2–12.9)
PMV BLD AUTO: 9.8 FL (ref 9.2–12.9)
PMV BLD AUTO: 9.9 FL (ref 9.2–12.9)
PO2 BLDA: 132 MMHG (ref 80–100)
PO2 BLDA: 162 MMHG (ref 80–100)
PO2 BLDA: 166 MMHG (ref 80–100)
PO2 BLDA: 183 MMHG (ref 80–100)
PO2 BLDA: 186 MMHG (ref 80–100)
PO2 BLDA: 21 MMHG (ref 40–60)
PO2 BLDA: 24 MMHG (ref 40–60)
PO2 BLDA: 25 MMHG (ref 40–60)
PO2 BLDA: 26 MMHG (ref 40–60)
PO2 BLDA: 27 MMHG (ref 40–60)
PO2 BLDA: 28 MMHG (ref 40–60)
PO2 BLDA: 29 MMHG (ref 40–60)
PO2 BLDA: 30 MMHG (ref 40–60)
PO2 BLDA: 31 MMHG (ref 40–60)
PO2 BLDA: 32 MMHG (ref 40–60)
PO2 BLDA: 33 MMHG (ref 40–60)
PO2 BLDA: 331 MMHG (ref 80–100)
PO2 BLDA: 34 MMHG (ref 40–60)
PO2 BLDA: 35 MMHG (ref 40–60)
PO2 BLDA: 37 MMHG (ref 40–60)
PO2 BLDA: 38 MMHG (ref 40–60)
PO2 BLDA: 43 MMHG (ref 40–60)
PO2 BLDA: 45 MMHG (ref 40–60)
PO2 BLDA: 45 MMHG (ref 40–60)
PO2 BLDA: 46 MMHG (ref 40–60)
PO2 BLDA: 47 MMHG (ref 40–60)
PO2 BLDA: 54 MMHG (ref 40–60)
PO2 BLDA: 77 MMHG (ref 80–100)
PO2 BLDA: 81 MMHG (ref 80–100)
POC BE: -1 MMOL/L
POC BE: -2 MMOL/L
POC BE: -3 MMOL/L
POC BE: -7 MMOL/L
POC BE: 0 MMOL/L
POC BE: 0 MMOL/L
POC BE: 1 MMOL/L
POC BE: 1 MMOL/L
POC BE: 10 MMOL/L
POC BE: 11 MMOL/L
POC BE: 12 MMOL/L
POC BE: 13 MMOL/L
POC BE: 14 MMOL/L
POC BE: 14 MMOL/L
POC BE: 16 MMOL/L
POC BE: 2 MMOL/L
POC BE: 3 MMOL/L
POC BE: 4 MMOL/L
POC BE: 5 MMOL/L
POC BE: 6 MMOL/L
POC BE: 6 MMOL/L
POC BE: 7 MMOL/L
POC BE: 8 MMOL/L
POC BE: 9 MMOL/L
POC BE: 9 MMOL/L
POC IONIZED CALCIUM: 0.97 MMOL/L (ref 1.06–1.42)
POC IONIZED CALCIUM: 1.14 MMOL/L (ref 1.06–1.42)
POC IONIZED CALCIUM: 1.14 MMOL/L (ref 1.06–1.42)
POC IONIZED CALCIUM: 1.15 MMOL/L (ref 1.06–1.42)
POC IONIZED CALCIUM: 1.2 MMOL/L (ref 1.06–1.42)
POC SATURATED O2: 100 % (ref 95–100)
POC SATURATED O2: 36 % (ref 95–100)
POC SATURATED O2: 42 % (ref 95–100)
POC SATURATED O2: 45 % (ref 95–100)
POC SATURATED O2: 46 % (ref 95–100)
POC SATURATED O2: 46 % (ref 95–100)
POC SATURATED O2: 47 % (ref 95–100)
POC SATURATED O2: 48 % (ref 95–100)
POC SATURATED O2: 49 % (ref 95–100)
POC SATURATED O2: 50 % (ref 95–100)
POC SATURATED O2: 51 % (ref 95–100)
POC SATURATED O2: 52 % (ref 95–100)
POC SATURATED O2: 54 % (ref 95–100)
POC SATURATED O2: 54 % (ref 95–100)
POC SATURATED O2: 55 % (ref 95–100)
POC SATURATED O2: 56 % (ref 95–100)
POC SATURATED O2: 57 % (ref 95–100)
POC SATURATED O2: 57 % (ref 95–100)
POC SATURATED O2: 58 % (ref 95–100)
POC SATURATED O2: 58 % (ref 95–100)
POC SATURATED O2: 59 % (ref 95–100)
POC SATURATED O2: 60 % (ref 95–100)
POC SATURATED O2: 60 % (ref 95–100)
POC SATURATED O2: 61 % (ref 95–100)
POC SATURATED O2: 62 % (ref 95–100)
POC SATURATED O2: 62 % (ref 95–100)
POC SATURATED O2: 66 % (ref 95–100)
POC SATURATED O2: 67 % (ref 95–100)
POC SATURATED O2: 68 % (ref 95–100)
POC SATURATED O2: 70 % (ref 95–100)
POC SATURATED O2: 77 % (ref 95–100)
POC SATURATED O2: 79 % (ref 95–100)
POC SATURATED O2: 82 % (ref 95–100)
POC SATURATED O2: 82 % (ref 95–100)
POC SATURATED O2: 83 % (ref 95–100)
POC SATURATED O2: 87 % (ref 95–100)
POC SATURATED O2: 96 % (ref 95–100)
POC SATURATED O2: 97 % (ref 95–100)
POC SATURATED O2: 99 % (ref 95–100)
POC SVO2: 47 %
POC SVO2: 56 %
POC SVO2: 56 %
POC SVO2: 77 %
POC TCO2 (MEASURED): 19 MMOL/L (ref 23–29)
POC TCO2 (MEASURED): 22 MMOL/L (ref 23–29)
POC TCO2 (MEASURED): 25 MMOL/L (ref 23–29)
POC TCO2: 20 MMOL/L (ref 24–29)
POC TCO2: 24 MMOL/L (ref 24–29)
POC TCO2: 24 MMOL/L (ref 24–29)
POC TCO2: 25 MMOL/L (ref 23–27)
POC TCO2: 25 MMOL/L (ref 24–29)
POC TCO2: 26 MMOL/L (ref 23–27)
POC TCO2: 26 MMOL/L (ref 23–27)
POC TCO2: 26 MMOL/L (ref 24–29)
POC TCO2: 27 MMOL/L (ref 23–27)
POC TCO2: 27 MMOL/L (ref 24–29)
POC TCO2: 28 MMOL/L (ref 23–27)
POC TCO2: 28 MMOL/L (ref 23–27)
POC TCO2: 28 MMOL/L (ref 24–29)
POC TCO2: 29 MMOL/L (ref 24–29)
POC TCO2: 30 MMOL/L (ref 24–29)
POC TCO2: 30 MMOL/L (ref 24–29)
POC TCO2: 31 MMOL/L (ref 24–29)
POC TCO2: 32 MMOL/L (ref 24–29)
POC TCO2: 32 MMOL/L (ref 24–29)
POC TCO2: 33 MMOL/L (ref 24–29)
POC TCO2: 34 MMOL/L (ref 24–29)
POC TCO2: 36 MMOL/L (ref 24–29)
POC TCO2: 36 MMOL/L (ref 24–29)
POC TCO2: 37 MMOL/L (ref 24–29)
POC TCO2: 38 MMOL/L (ref 24–29)
POC TCO2: 38 MMOL/L (ref 24–29)
POC TCO2: 39 MMOL/L (ref 24–29)
POC TCO2: 39 MMOL/L (ref 24–29)
POC TCO2: 40 MMOL/L (ref 24–29)
POC TCO2: 41 MMOL/L (ref 24–29)
POCT GLUCOSE: 113 MG/DL (ref 70–110)
POTASSIUM BLD-SCNC: 4 MMOL/L (ref 3.5–5.1)
POTASSIUM BLD-SCNC: 4.3 MMOL/L (ref 3.5–5.1)
POTASSIUM BLD-SCNC: 4.3 MMOL/L (ref 3.5–5.1)
POTASSIUM BLD-SCNC: 4.9 MMOL/L (ref 3.5–5.1)
POTASSIUM BLD-SCNC: 5 MMOL/L (ref 3.5–5.1)
POTASSIUM SERPL-SCNC: 3.1 MMOL/L (ref 3.5–5.1)
POTASSIUM SERPL-SCNC: 3.2 MMOL/L (ref 3.5–5.1)
POTASSIUM SERPL-SCNC: 3.4 MMOL/L (ref 3.5–5.1)
POTASSIUM SERPL-SCNC: 3.6 MMOL/L (ref 3.5–5.1)
POTASSIUM SERPL-SCNC: 3.6 MMOL/L (ref 3.5–5.1)
POTASSIUM SERPL-SCNC: 3.7 MMOL/L (ref 3.5–5.1)
POTASSIUM SERPL-SCNC: 3.8 MMOL/L (ref 3.5–5.1)
POTASSIUM SERPL-SCNC: 3.8 MMOL/L (ref 3.5–5.1)
POTASSIUM SERPL-SCNC: 3.9 MMOL/L (ref 3.5–5.1)
POTASSIUM SERPL-SCNC: 4 MMOL/L (ref 3.5–5.1)
POTASSIUM SERPL-SCNC: 4.1 MMOL/L (ref 3.5–5.1)
POTASSIUM SERPL-SCNC: 4.2 MMOL/L (ref 3.5–5.1)
POTASSIUM SERPL-SCNC: 4.3 MMOL/L (ref 3.5–5.1)
POTASSIUM SERPL-SCNC: 4.4 MMOL/L (ref 3.5–5.1)
POTASSIUM SERPL-SCNC: 4.5 MMOL/L (ref 3.5–5.1)
POTASSIUM SERPL-SCNC: 4.6 MMOL/L (ref 3.5–5.1)
POTASSIUM SERPL-SCNC: 4.7 MMOL/L (ref 3.5–5.1)
POTASSIUM SERPL-SCNC: 4.9 MMOL/L (ref 3.5–5.1)
POTASSIUM SERPL-SCNC: 4.9 MMOL/L (ref 3.5–5.1)
POTASSIUM SERPL-SCNC: 5 MMOL/L (ref 3.5–5.1)
POTASSIUM SERPL-SCNC: 5.1 MMOL/L (ref 3.5–5.1)
POTASSIUM SERPL-SCNC: 5.1 MMOL/L (ref 3.5–5.1)
PREALB SERPL-MCNC: 36 MG/DL (ref 20–43)
PROCALCITONIN SERPL IA-MCNC: 0.1 NG/ML
PROCALCITONIN SERPL IA-MCNC: 0.31 NG/ML
PROPOXYPH SERPL QL: NEGATIVE
PROT C AG ACT/NOR PPP IA: >95 % (ref 63–153)
PROT S AG ACT/NOR PPP IA: 107 % (ref 50–140)
PROT SERPL-MCNC: 5.2 G/DL (ref 6–8.4)
PROT SERPL-MCNC: 5.8 G/DL (ref 6–8.4)
PROT SERPL-MCNC: 5.9 G/DL (ref 6–8.4)
PROT SERPL-MCNC: 6 G/DL (ref 6–8.4)
PROT SERPL-MCNC: 6.1 G/DL (ref 6–8.4)
PROT SERPL-MCNC: 6.2 G/DL (ref 6–8.4)
PROT SERPL-MCNC: 6.3 G/DL (ref 6–8.4)
PROT SERPL-MCNC: 6.4 G/DL (ref 6–8.4)
PROT SERPL-MCNC: 6.4 G/DL (ref 6–8.4)
PROT SERPL-MCNC: 6.5 G/DL (ref 6–8.4)
PROT SERPL-MCNC: 6.6 G/DL (ref 6–8.4)
PROT SERPL-MCNC: 6.6 G/DL (ref 6–8.4)
PROT SERPL-MCNC: 6.7 G/DL (ref 6–8.4)
PROT SERPL-MCNC: 6.8 G/DL (ref 6–8.4)
PROT SERPL-MCNC: 6.9 G/DL (ref 6–8.4)
PROT SERPL-MCNC: 6.9 G/DL (ref 6–8.4)
PROT SERPL-MCNC: 7 G/DL (ref 6–8.4)
PROT SERPL-MCNC: 7 G/DL (ref 6–8.4)
PROT SERPL-MCNC: 7.2 G/DL (ref 6–8.4)
PROT SERPL-MCNC: 7.5 G/DL (ref 6–8.4)
PROT UR QL STRIP: ABNORMAL
PROT UR QL STRIP: ABNORMAL
PROT UR QL STRIP: NEGATIVE
PROTHROMBIN TIME: 10.1 SEC (ref 9–12.5)
PROTHROMBIN TIME: 10.2 SEC (ref 9–12.5)
PROTHROMBIN TIME: 10.3 SEC (ref 9–12.5)
PROTHROMBIN TIME: 10.3 SEC (ref 9–12.5)
PROTHROMBIN TIME: 10.4 SEC (ref 9–12.5)
PROTHROMBIN TIME: 12.1 SEC (ref 12–15.5)
PROTHROMBIN TIME: 9.9 SEC (ref 9–12.5)
PROVIDER NOTIFIED: ABNORMAL
PULM VEIN S/D RATIO: 0.59
PULM VEIN S/D RATIO: 1.05
PV PEAK D VEL: 0.41 M/S
PV PEAK D VEL: 0.54 M/S
PV PEAK S VEL: 0.32 M/S
PV PEAK S VEL: 0.43 M/S
RA MAJOR: 3.72 CM
RA MAJOR: 4.2 CM
RA MAJOR: 4.35 CM
RA MAJOR: 4.61 CM
RA PRESSURE: 3 MMHG
RA WIDTH: 3.13 CM
RA WIDTH: 3.13 CM
RA WIDTH: 3.46 CM
RA WIDTH: 3.98 CM
RBC # BLD AUTO: 2.99 M/UL (ref 4.6–6.2)
RBC # BLD AUTO: 3.03 M/UL (ref 4.6–6.2)
RBC # BLD AUTO: 3.08 M/UL (ref 4.6–6.2)
RBC # BLD AUTO: 3.1 M/UL (ref 4.6–6.2)
RBC # BLD AUTO: 3.11 M/UL (ref 4.6–6.2)
RBC # BLD AUTO: 3.12 M/UL (ref 4.6–6.2)
RBC # BLD AUTO: 3.13 M/UL (ref 4.6–6.2)
RBC # BLD AUTO: 3.14 M/UL (ref 4.6–6.2)
RBC # BLD AUTO: 3.16 M/UL (ref 4.6–6.2)
RBC # BLD AUTO: 3.17 M/UL (ref 4.6–6.2)
RBC # BLD AUTO: 3.17 M/UL (ref 4.6–6.2)
RBC # BLD AUTO: 3.19 M/UL (ref 4.6–6.2)
RBC # BLD AUTO: 3.21 M/UL (ref 4.6–6.2)
RBC # BLD AUTO: 3.3 M/UL (ref 4.6–6.2)
RBC # BLD AUTO: 3.32 M/UL (ref 4.6–6.2)
RBC # BLD AUTO: 3.32 M/UL (ref 4.6–6.2)
RBC # BLD AUTO: 3.34 M/UL (ref 4.6–6.2)
RBC # BLD AUTO: 3.34 M/UL (ref 4.6–6.2)
RBC # BLD AUTO: 3.37 M/UL (ref 4.6–6.2)
RBC # BLD AUTO: 3.37 M/UL (ref 4.6–6.2)
RBC # BLD AUTO: 3.39 M/UL (ref 4.6–6.2)
RBC # BLD AUTO: 3.43 M/UL (ref 4.6–6.2)
RBC # BLD AUTO: 3.7 M/UL (ref 4.6–6.2)
RBC # BLD AUTO: 3.73 M/UL (ref 4.6–6.2)
RBC # BLD AUTO: 3.77 M/UL (ref 4.6–6.2)
RBC # BLD AUTO: 3.8 M/UL (ref 4.6–6.2)
RBC # BLD AUTO: 3.82 M/UL (ref 4.6–6.2)
RBC # BLD AUTO: 3.84 M/UL (ref 4.6–6.2)
RBC # BLD AUTO: 3.87 M/UL (ref 4.6–6.2)
RBC # BLD AUTO: 3.88 M/UL (ref 4.6–6.2)
RBC # BLD AUTO: 3.9 M/UL (ref 4.6–6.2)
RBC # BLD AUTO: 3.92 M/UL (ref 4.6–6.2)
RBC # BLD AUTO: 3.93 M/UL (ref 4.6–6.2)
RBC # BLD AUTO: 3.94 M/UL (ref 4.6–6.2)
RBC # BLD AUTO: 3.97 M/UL (ref 4.6–6.2)
RBC # BLD AUTO: 4.01 M/UL (ref 4.6–6.2)
RBC # BLD AUTO: 4.01 M/UL (ref 4.6–6.2)
RBC # BLD AUTO: 4.02 M/UL (ref 4.6–6.2)
RBC # BLD AUTO: 4.02 M/UL (ref 4.6–6.2)
RBC # BLD AUTO: 4.05 M/UL (ref 4.6–6.2)
RBC # BLD AUTO: 4.07 M/UL (ref 4.6–6.2)
RBC # BLD AUTO: 4.09 M/UL (ref 4.6–6.2)
RBC # BLD AUTO: 4.1 M/UL (ref 4.6–6.2)
RBC # BLD AUTO: 4.11 M/UL (ref 4.6–6.2)
RBC # BLD AUTO: 4.13 M/UL (ref 4.6–6.2)
RBC # BLD AUTO: 4.16 M/UL (ref 4.6–6.2)
RBC # BLD AUTO: 4.18 M/UL (ref 4.6–6.2)
RBC # BLD AUTO: 4.26 M/UL (ref 4.6–6.2)
RBC # BLD AUTO: 4.3 M/UL (ref 4.6–6.2)
RBC # BLD AUTO: 4.32 M/UL (ref 4.6–6.2)
RBC # BLD AUTO: 4.33 M/UL (ref 4.6–6.2)
RBC # BLD AUTO: 4.34 M/UL (ref 4.6–6.2)
RBC # BLD AUTO: 4.37 M/UL (ref 4.6–6.2)
RBC # BLD AUTO: 4.38 M/UL (ref 4.6–6.2)
RBC # BLD AUTO: 4.39 M/UL (ref 4.6–6.2)
RBC # BLD AUTO: 4.41 M/UL (ref 4.6–6.2)
RBC # BLD AUTO: 4.42 M/UL (ref 4.6–6.2)
RBC # BLD AUTO: 4.46 M/UL (ref 4.6–6.2)
RBC # BLD AUTO: 4.47 M/UL (ref 4.6–6.2)
RBC # BLD AUTO: 4.62 M/UL (ref 4.6–6.2)
RBC # BLD AUTO: 4.62 M/UL (ref 4.6–6.2)
RBC # BLD AUTO: 4.65 M/UL (ref 4.6–6.2)
RBC # BLD AUTO: 4.7 M/UL (ref 4.6–6.2)
RBC # BLD AUTO: 4.7 M/UL (ref 4.6–6.2)
RBC # BLD AUTO: 4.86 M/UL (ref 4.6–6.2)
RBC # BLD AUTO: 5.16 M/UL (ref 4.6–6.2)
RBC #/AREA URNS AUTO: 1 /HPF (ref 0–4)
RBC #/AREA URNS AUTO: 28 /HPF (ref 0–4)
RBC #/AREA URNS AUTO: 6 /HPF (ref 0–4)
REPTILASE TIME: NORMAL SEC
RESPIRATORY INFECTION PANEL SOURCE: NORMAL
RIGHT ABI: 1.08
RIGHT CBA DIAS: 14 CM/S
RIGHT CBA SYS: 57 CM/S
RIGHT CCA DIST DIAS: 20 CM/S
RIGHT CCA DIST SYS: 85 CM/S
RIGHT CCA MID DIAS: 23 CM/S
RIGHT CCA MID SYS: 81 CM/S
RIGHT CCA PROX DIAS: 21 CM/S
RIGHT CCA PROX SYS: 84 CM/S
RIGHT DORSALIS PEDIS: 120 MMHG
RIGHT ECA DIAS: 13 CM/S
RIGHT ECA SYS: 63 CM/S
RIGHT ICA DIST DIAS: 20 CM/S
RIGHT ICA DIST SYS: 57 CM/S
RIGHT ICA MID DIAS: 20 CM/S
RIGHT ICA MID SYS: 52 CM/S
RIGHT ICA PROX DIAS: 17 CM/S
RIGHT ICA PROX SYS: 51 CM/S
RIGHT POSTERIOR TIBIAL: 129 MMHG
RIGHT VENTRICULAR END-DIASTOLIC DIMENSION: 3.02 CM
RIGHT VENTRICULAR END-DIASTOLIC DIMENSION: 3.2 CM
RIGHT VENTRICULAR END-DIASTOLIC DIMENSION: 3.46 CM
RIGHT VENTRICULAR END-DIASTOLIC DIMENSION: 3.87 CM
RIGHT VERTEBRAL DIAS: 13 CM/S
RIGHT VERTEBRAL SYS: 33 CM/S
RSV RNA NPH QL NAA+NON-PROBE: NOT DETECTED
RV TISSUE DOPPLER FREE WALL SYSTOLIC VELOCITY 1 (APICAL 4 CHAMBER VIEW): 12.92 CM/S
RV TISSUE DOPPLER FREE WALL SYSTOLIC VELOCITY 1 (APICAL 4 CHAMBER VIEW): 6.65 CM/S
RV+EV RNA NPH QL NAA+NON-PROBE: NOT DETECTED
S PNEUM AG UR QL: NOT DETECTED
SAMPLE: ABNORMAL
SAMPLE: NORMAL
SARS-COV-2 AG RESP QL IA.RAPID: NEGATIVE
SARS-COV-2 RDRP RESP QL NAA+PROBE: NEGATIVE
SARS-COV-2 RNA RESP QL NAA+PROBE: NOT DETECTED
SATURATED IRON: 13 % (ref 20–50)
SATURATED IRON: 13 % (ref 20–50)
SCREEN APTT: 37 SEC (ref 32–48)
SCREEN DRVVT: 34 SEC (ref 33–44)
SINUS: 2.94 CM
SINUS: 3.29 CM
SINUS: 3.5 CM
SINUS: 3.7 CM
SINUS: 3.91 CM
SITE: ABNORMAL
SITE: NORMAL
SODIUM BLD-SCNC: 128 MMOL/L (ref 136–145)
SODIUM BLD-SCNC: 132 MMOL/L (ref 136–145)
SODIUM BLD-SCNC: 133 MMOL/L (ref 136–145)
SODIUM BLD-SCNC: 135 MMOL/L (ref 136–145)
SODIUM BLD-SCNC: 137 MMOL/L (ref 136–145)
SODIUM SERPL-SCNC: 128 MMOL/L (ref 136–145)
SODIUM SERPL-SCNC: 129 MMOL/L (ref 136–145)
SODIUM SERPL-SCNC: 131 MMOL/L (ref 136–145)
SODIUM SERPL-SCNC: 132 MMOL/L (ref 136–145)
SODIUM SERPL-SCNC: 134 MMOL/L (ref 136–145)
SODIUM SERPL-SCNC: 135 MMOL/L (ref 136–145)
SODIUM SERPL-SCNC: 136 MMOL/L (ref 136–145)
SODIUM SERPL-SCNC: 137 MMOL/L (ref 136–145)
SODIUM SERPL-SCNC: 138 MMOL/L (ref 136–145)
SODIUM SERPL-SCNC: 139 MMOL/L (ref 136–145)
SODIUM SERPL-SCNC: 140 MMOL/L (ref 136–145)
SODIUM SERPL-SCNC: 141 MMOL/L (ref 136–145)
SODIUM SERPL-SCNC: 142 MMOL/L (ref 136–145)
SODIUM SERPL-SCNC: 143 MMOL/L (ref 136–145)
SP GR UR STRIP: 1 (ref 1–1.03)
SP GR UR STRIP: 1.01 (ref 1–1.03)
SP GR UR STRIP: 1.02 (ref 1–1.03)
SP02: 100
SP02: 93
SP02: 94
SP02: 94
SP02: 96
SP02: 96
SP02: 97
SPECIMEN SOURCE: NORMAL
SPECIMEN SOURCE: NORMAL
SQUAMOUS #/AREA URNS AUTO: 0 /HPF
SQUAMOUS #/AREA URNS AUTO: 0 /HPF
STAPH AUREUS ID BY PCR: POSITIVE
STJ: 2.68 CM
STJ: 3.17 CM
STJ: 3.3 CM
STJ: 3.34 CM
STJ: 3.46 CM
STRESS ECHO POST EXERCISE DUR MIN: 3 MINUTES
STRESS ECHO POST EXERCISE DUR SEC: 51 SECONDS
SYSTOLIC BLOOD PRESSURE: 111 MMHG
T4 FREE SERPL-MCNC: 0.93 NG/DL (ref 0.71–1.51)
T4 FREE SERPL-MCNC: 0.99 NG/DL (ref 0.71–1.51)
TB GOLD PLUS: NEGATIVE
TB2 - NIL: 0.03 IU/ML
TDI LATERAL: 0.06 M/S
TDI LATERAL: 0.08 M/S
TDI LATERAL: 0.09 M/S
TDI LATERAL: 0.11 M/S
TDI SEPTAL: 0.03 M/S
TDI SEPTAL: 0.07 M/S
TDI SEPTAL: 0.08 M/S
TDI SEPTAL: 0.08 M/S
TDI: 0.06 M/S
TDI: 0.07 M/S
TDI: 0.08 M/S
TDI: 0.1 M/S
THROMBIN TIME: NORMAL SEC (ref 14.7–19.5)
TIME NOTIFIED: 1650
TOTAL IRON BINDING CAPACITY: 366 UG/DL (ref 250–450)
TOTAL IRON BINDING CAPACITY: 407 UG/DL (ref 250–450)
TR MAX PG: 22 MMHG
TRANS ERYTHROCYTES VOL PATIENT: NORMAL ML
TRANSFERRIN SERPL-MCNC: 247 MG/DL (ref 200–375)
TRANSFERRIN SERPL-MCNC: 275 MG/DL (ref 200–375)
TRANSFERRIN SERPL-MCNC: 275 MG/DL (ref 200–375)
TRICUSPID ANNULAR PLANE SYSTOLIC EXCURSION: 1.6 CM
TRICUSPID ANNULAR PLANE SYSTOLIC EXCURSION: 1.69 CM
TRICUSPID ANNULAR PLANE SYSTOLIC EXCURSION: 1.99 CM
TRIGL SERPL-MCNC: 186 MG/DL (ref 30–150)
TROPONIN I SERPL DL<=0.01 NG/ML-MCNC: 0.03 NG/ML (ref 0–0.03)
TROPONIN I SERPL DL<=0.01 NG/ML-MCNC: 0.07 NG/ML (ref 0–0.03)
TROPONIN I SERPL DL<=0.01 NG/ML-MCNC: 0.07 NG/ML (ref 0–0.03)
TROPONIN I SERPL DL<=0.01 NG/ML-MCNC: 0.08 NG/ML (ref 0–0.03)
TROPONIN I SERPL DL<=0.01 NG/ML-MCNC: 0.08 NG/ML (ref 0–0.03)
TROPONIN I SERPL DL<=0.01 NG/ML-MCNC: 0.09 NG/ML (ref 0–0.03)
TROPONIN I SERPL DL<=0.01 NG/ML-MCNC: 0.1 NG/ML (ref 0–0.03)
TROPONIN I SERPL DL<=0.01 NG/ML-MCNC: 0.1 NG/ML (ref 0–0.03)
TROPONIN I SERPL DL<=0.01 NG/ML-MCNC: 0.12 NG/ML (ref 0–0.03)
TROPONIN I SERPL DL<=0.01 NG/ML-MCNC: 0.15 NG/ML (ref 0–0.03)
TROPONIN I SERPL DL<=0.01 NG/ML-MCNC: 0.21 NG/ML (ref 0–0.03)
TROPONIN I SERPL DL<=0.01 NG/ML-MCNC: 0.36 NG/ML (ref 0–0.03)
TROPONIN I SERPL DL<=0.01 NG/ML-MCNC: 0.37 NG/ML (ref 0–0.03)
TROPONIN I SERPL DL<=0.01 NG/ML-MCNC: 0.4 NG/ML (ref 0–0.03)
TSH SERPL DL<=0.005 MIU/L-ACNC: 2.83 UIU/ML (ref 0.4–4)
TSH SERPL DL<=0.005 MIU/L-ACNC: 3.21 UIU/ML (ref 0.4–4)
TV REST PULMONARY ARTERY PRESSURE: 25 MMHG
URATE SERPL-MCNC: 11.2 MG/DL (ref 3.4–7)
URATE SERPL-MCNC: 7.3 MG/DL (ref 3.4–7)
URN SPEC COLLECT METH UR: ABNORMAL
URN SPEC COLLECT METH UR: NORMAL
UUN UR-MCNC: 196 MG/DL (ref 140–1050)
VANCOMYCIN SERPL-MCNC: 11.2 UG/ML
VANCOMYCIN SERPL-MCNC: 15.3 UG/ML
VANCOMYCIN SERPL-MCNC: 16.2 UG/ML
VANCOMYCIN SERPL-MCNC: 17.5 UG/ML
VANCOMYCIN SERPL-MCNC: 19.2 UG/ML
VANCOMYCIN SERPL-MCNC: 23.8 UG/ML
VANCOMYCIN TROUGH SERPL-MCNC: 14.3 UG/ML (ref 10–22)
VANCOMYCIN TROUGH SERPL-MCNC: 18.5 UG/ML (ref 10–22)
VT: 470
VT: 500
VWF:AC ACT/NOR PPP IA: 115 % (ref 55–200)
WBC # BLD AUTO: 10.14 K/UL (ref 3.9–12.7)
WBC # BLD AUTO: 10.37 K/UL (ref 3.9–12.7)
WBC # BLD AUTO: 10.95 K/UL (ref 3.9–12.7)
WBC # BLD AUTO: 11.1 K/UL (ref 3.9–12.7)
WBC # BLD AUTO: 11.18 K/UL (ref 3.9–12.7)
WBC # BLD AUTO: 12.03 K/UL (ref 3.9–12.7)
WBC # BLD AUTO: 14.27 K/UL (ref 3.9–12.7)
WBC # BLD AUTO: 14.62 K/UL (ref 3.9–12.7)
WBC # BLD AUTO: 14.66 K/UL (ref 3.9–12.7)
WBC # BLD AUTO: 15.27 K/UL (ref 3.9–12.7)
WBC # BLD AUTO: 15.34 K/UL (ref 3.9–12.7)
WBC # BLD AUTO: 16.33 K/UL (ref 3.9–12.7)
WBC # BLD AUTO: 17.97 K/UL (ref 3.9–12.7)
WBC # BLD AUTO: 18.08 K/UL (ref 3.9–12.7)
WBC # BLD AUTO: 4.05 K/UL (ref 3.9–12.7)
WBC # BLD AUTO: 4.31 K/UL (ref 3.9–12.7)
WBC # BLD AUTO: 4.37 K/UL (ref 3.9–12.7)
WBC # BLD AUTO: 4.65 K/UL (ref 3.9–12.7)
WBC # BLD AUTO: 4.8 K/UL (ref 3.9–12.7)
WBC # BLD AUTO: 4.82 K/UL (ref 3.9–12.7)
WBC # BLD AUTO: 4.86 K/UL (ref 3.9–12.7)
WBC # BLD AUTO: 5.18 K/UL (ref 3.9–12.7)
WBC # BLD AUTO: 5.23 K/UL (ref 3.9–12.7)
WBC # BLD AUTO: 5.25 K/UL (ref 3.9–12.7)
WBC # BLD AUTO: 5.41 K/UL (ref 3.9–12.7)
WBC # BLD AUTO: 5.44 K/UL (ref 3.9–12.7)
WBC # BLD AUTO: 5.6 K/UL (ref 3.9–12.7)
WBC # BLD AUTO: 5.67 K/UL (ref 3.9–12.7)
WBC # BLD AUTO: 5.68 K/UL (ref 3.9–12.7)
WBC # BLD AUTO: 5.79 K/UL (ref 3.9–12.7)
WBC # BLD AUTO: 5.87 K/UL (ref 3.9–12.7)
WBC # BLD AUTO: 5.91 K/UL (ref 3.9–12.7)
WBC # BLD AUTO: 5.93 K/UL (ref 3.9–12.7)
WBC # BLD AUTO: 6.04 K/UL (ref 3.9–12.7)
WBC # BLD AUTO: 6.07 K/UL (ref 3.9–12.7)
WBC # BLD AUTO: 6.14 K/UL (ref 3.9–12.7)
WBC # BLD AUTO: 6.14 K/UL (ref 3.9–12.7)
WBC # BLD AUTO: 6.29 K/UL (ref 3.9–12.7)
WBC # BLD AUTO: 6.34 K/UL (ref 3.9–12.7)
WBC # BLD AUTO: 6.56 K/UL (ref 3.9–12.7)
WBC # BLD AUTO: 6.56 K/UL (ref 3.9–12.7)
WBC # BLD AUTO: 6.57 K/UL (ref 3.9–12.7)
WBC # BLD AUTO: 6.63 K/UL (ref 3.9–12.7)
WBC # BLD AUTO: 6.66 K/UL (ref 3.9–12.7)
WBC # BLD AUTO: 6.71 K/UL (ref 3.9–12.7)
WBC # BLD AUTO: 6.75 K/UL (ref 3.9–12.7)
WBC # BLD AUTO: 6.88 K/UL (ref 3.9–12.7)
WBC # BLD AUTO: 7 K/UL (ref 3.9–12.7)
WBC # BLD AUTO: 7.02 K/UL (ref 3.9–12.7)
WBC # BLD AUTO: 7.07 K/UL (ref 3.9–12.7)
WBC # BLD AUTO: 7.12 K/UL (ref 3.9–12.7)
WBC # BLD AUTO: 7.21 K/UL (ref 3.9–12.7)
WBC # BLD AUTO: 7.3 K/UL (ref 3.9–12.7)
WBC # BLD AUTO: 7.53 K/UL (ref 3.9–12.7)
WBC # BLD AUTO: 7.56 K/UL (ref 3.9–12.7)
WBC # BLD AUTO: 7.69 K/UL (ref 3.9–12.7)
WBC # BLD AUTO: 7.93 K/UL (ref 3.9–12.7)
WBC # BLD AUTO: 7.97 K/UL (ref 3.9–12.7)
WBC # BLD AUTO: 8.02 K/UL (ref 3.9–12.7)
WBC # BLD AUTO: 8.21 K/UL (ref 3.9–12.7)
WBC # BLD AUTO: 8.4 K/UL (ref 3.9–12.7)
WBC # BLD AUTO: 8.68 K/UL (ref 3.9–12.7)
WBC # BLD AUTO: 8.7 K/UL (ref 3.9–12.7)
WBC # BLD AUTO: 8.83 K/UL (ref 3.9–12.7)
WBC # BLD AUTO: 9.07 K/UL (ref 3.9–12.7)
WBC # BLD AUTO: 9.11 K/UL (ref 3.9–12.7)
WBC # BLD AUTO: 9.29 K/UL (ref 3.9–12.7)
WBC # BLD AUTO: 9.43 K/UL (ref 3.9–12.7)
WBC # BLD AUTO: 9.82 K/UL (ref 3.9–12.7)
WBC # BLD AUTO: 9.88 K/UL (ref 3.9–12.7)
WBC #/AREA URNS AUTO: 1 /HPF (ref 0–5)
WBC #/AREA URNS AUTO: 2 /HPF (ref 0–5)
WBC #/AREA URNS AUTO: 3 /HPF (ref 0–5)
WBC #/AREA URNS AUTO: 5 /HPF (ref 0–5)
WBC #/AREA URNS AUTO: 6 /HPF (ref 0–5)
WBC #/AREA URNS AUTO: >100 /HPF (ref 0–5)
WBC CLUMPS UR QL AUTO: ABNORMAL
WBC TOXIC VACUOLES BLD QL SMEAR: PRESENT
YEAST UR QL AUTO: ABNORMAL
YEAST UR QL AUTO: NORMAL
YEAST UR QL AUTO: NORMAL

## 2022-01-01 PROCEDURE — 63600175 PHARM REV CODE 636 W HCPCS: Mod: NTX | Performed by: PHYSICIAN ASSISTANT

## 2022-01-01 PROCEDURE — 99497 ADVNCD CARE PLAN 30 MIN: CPT | Mod: NTX,,, | Performed by: CLINICAL NURSE SPECIALIST

## 2022-01-01 PROCEDURE — 1160F PR REVIEW ALL MEDS BY PRESCRIBER/CLIN PHARMACIST DOCUMENTED: ICD-10-PCS | Mod: CPTII,S$GLB,TXP, | Performed by: THORACIC SURGERY (CARDIOTHORACIC VASCULAR SURGERY)

## 2022-01-01 PROCEDURE — 84100 ASSAY OF PHOSPHORUS: CPT | Mod: NTX | Performed by: STUDENT IN AN ORGANIZED HEALTH CARE EDUCATION/TRAINING PROGRAM

## 2022-01-01 PROCEDURE — D9220A PRA ANESTHESIA: Mod: ANES,NTX,, | Performed by: ANESTHESIOLOGY

## 2022-01-01 PROCEDURE — D9220A PRA ANESTHESIA: ICD-10-PCS | Mod: ANES,NTX,, | Performed by: ANESTHESIOLOGY

## 2022-01-01 PROCEDURE — 87205 SMEAR GRAM STAIN: CPT | Mod: NTX | Performed by: STUDENT IN AN ORGANIZED HEALTH CARE EDUCATION/TRAINING PROGRAM

## 2022-01-01 PROCEDURE — 25000003 PHARM REV CODE 250: Mod: NTX | Performed by: STUDENT IN AN ORGANIZED HEALTH CARE EDUCATION/TRAINING PROGRAM

## 2022-01-01 PROCEDURE — 27000221 HC OXYGEN, UP TO 24 HOURS

## 2022-01-01 PROCEDURE — 63600175 PHARM REV CODE 636 W HCPCS: Mod: NTX | Performed by: STUDENT IN AN ORGANIZED HEALTH CARE EDUCATION/TRAINING PROGRAM

## 2022-01-01 PROCEDURE — 99233 PR SUBSEQUENT HOSPITAL CARE,LEVL III: ICD-10-PCS | Mod: NTX,,, | Performed by: STUDENT IN AN ORGANIZED HEALTH CARE EDUCATION/TRAINING PROGRAM

## 2022-01-01 PROCEDURE — 70450 CT HEAD/BRAIN W/O DYE: CPT | Mod: TC,TXP

## 2022-01-01 PROCEDURE — 99900035 HC TECH TIME PER 15 MIN (STAT): Mod: NTX

## 2022-01-01 PROCEDURE — 93295 DEV INTERROG REMOTE 1/2/MLT: CPT | Mod: ,,, | Performed by: INTERNAL MEDICINE

## 2022-01-01 PROCEDURE — S0030 INJECTION, METRONIDAZOLE: HCPCS | Performed by: STUDENT IN AN ORGANIZED HEALTH CARE EDUCATION/TRAINING PROGRAM

## 2022-01-01 PROCEDURE — 3044F PR MOST RECENT HEMOGLOBIN A1C LEVEL <7.0%: ICD-10-PCS | Mod: CPTII,NTX,S$GLB, | Performed by: INTERNAL MEDICINE

## 2022-01-01 PROCEDURE — 20600001 HC STEP DOWN PRIVATE ROOM: Mod: NTX

## 2022-01-01 PROCEDURE — 63600175 PHARM REV CODE 636 W HCPCS: Performed by: STUDENT IN AN ORGANIZED HEALTH CARE EDUCATION/TRAINING PROGRAM

## 2022-01-01 PROCEDURE — 80053 COMPREHEN METABOLIC PANEL: CPT | Mod: NTX | Performed by: STUDENT IN AN ORGANIZED HEALTH CARE EDUCATION/TRAINING PROGRAM

## 2022-01-01 PROCEDURE — 87502 INFLUENZA DNA AMP PROBE: CPT | Mod: NTX | Performed by: EMERGENCY MEDICINE

## 2022-01-01 PROCEDURE — 99233 SBSQ HOSP IP/OBS HIGH 50: CPT | Mod: NTX,,, | Performed by: INTERNAL MEDICINE

## 2022-01-01 PROCEDURE — 99214 OFFICE O/P EST MOD 30 MIN: CPT | Mod: NTX,S$GLB,, | Performed by: INTERNAL MEDICINE

## 2022-01-01 PROCEDURE — 84153 ASSAY OF PSA TOTAL: CPT | Mod: TXP | Performed by: INTERNAL MEDICINE

## 2022-01-01 PROCEDURE — 99999 PR PBB SHADOW E&M-EST. PATIENT-LVL III: ICD-10-PCS | Mod: PBBFAC,,, | Performed by: INTERNAL MEDICINE

## 2022-01-01 PROCEDURE — 25000003 PHARM REV CODE 250: Mod: NTX | Performed by: INTERNAL MEDICINE

## 2022-01-01 PROCEDURE — 85025 COMPLETE CBC W/AUTO DIFF WBC: CPT | Mod: NTX | Performed by: STUDENT IN AN ORGANIZED HEALTH CARE EDUCATION/TRAINING PROGRAM

## 2022-01-01 PROCEDURE — 85730 THROMBOPLASTIN TIME PARTIAL: CPT | Mod: 91,NTX | Performed by: INTERNAL MEDICINE

## 2022-01-01 PROCEDURE — 36415 COLL VENOUS BLD VENIPUNCTURE: CPT | Mod: NTX | Performed by: STUDENT IN AN ORGANIZED HEALTH CARE EDUCATION/TRAINING PROGRAM

## 2022-01-01 PROCEDURE — 94761 N-INVAS EAR/PLS OXIMETRY MLT: CPT | Mod: NTX

## 2022-01-01 PROCEDURE — 1111F PR DISCHARGE MEDS RECONCILED W/ CURRENT OUTPATIENT MED LIST: ICD-10-PCS | Mod: CPTII,NTX,S$GLB, | Performed by: STUDENT IN AN ORGANIZED HEALTH CARE EDUCATION/TRAINING PROGRAM

## 2022-01-01 PROCEDURE — 45380 COLONOSCOPY AND BIOPSY: CPT | Mod: PT,NTX,, | Performed by: STUDENT IN AN ORGANIZED HEALTH CARE EDUCATION/TRAINING PROGRAM

## 2022-01-01 PROCEDURE — 25000003 PHARM REV CODE 250: Mod: NTX | Performed by: HOSPITALIST

## 2022-01-01 PROCEDURE — 99285 EMERGENCY DEPT VISIT HI MDM: CPT | Mod: 25,NTX

## 2022-01-01 PROCEDURE — 94150 VITAL CAPACITY TEST: CPT | Mod: NTX

## 2022-01-01 PROCEDURE — 80053 COMPREHEN METABOLIC PANEL: CPT | Mod: TXP | Performed by: INTERNAL MEDICINE

## 2022-01-01 PROCEDURE — 82803 BLOOD GASES ANY COMBINATION: CPT | Mod: NTX

## 2022-01-01 PROCEDURE — 1159F PR MEDICATION LIST DOCUMENTED IN MEDICAL RECORD: ICD-10-PCS | Mod: CPTII,S$GLB,, | Performed by: INTERNAL MEDICINE

## 2022-01-01 PROCEDURE — 80048 BASIC METABOLIC PNL TOTAL CA: CPT | Mod: NTX | Performed by: STUDENT IN AN ORGANIZED HEALTH CARE EDUCATION/TRAINING PROGRAM

## 2022-01-01 PROCEDURE — 85025 COMPLETE CBC W/AUTO DIFF WBC: CPT | Mod: 91,NTX | Performed by: INTERNAL MEDICINE

## 2022-01-01 PROCEDURE — 93296 REM INTERROG EVL PM/IDS: CPT | Performed by: INTERNAL MEDICINE

## 2022-01-01 PROCEDURE — 4010F ACE/ARB THERAPY RXD/TAKEN: CPT | Mod: CPTII,NTX,S$GLB, | Performed by: INTERNAL MEDICINE

## 2022-01-01 PROCEDURE — 12000002 HC ACUTE/MED SURGE SEMI-PRIVATE ROOM: Mod: NTX

## 2022-01-01 PROCEDURE — 99024 POSTOP FOLLOW-UP VISIT: CPT | Mod: GC,NTX,, | Performed by: STUDENT IN AN ORGANIZED HEALTH CARE EDUCATION/TRAINING PROGRAM

## 2022-01-01 PROCEDURE — 94727 GAS DIL/WSHOT DETER LNG VOL: CPT | Mod: NTX,S$GLB,, | Performed by: INTERNAL MEDICINE

## 2022-01-01 PROCEDURE — 27000207 HC ISOLATION: Mod: NTX

## 2022-01-01 PROCEDURE — 3288F PR FALLS RISK ASSESSMENT DOCUMENTED: ICD-10-PCS | Mod: CPTII,NTX,S$GLB, | Performed by: INTERNAL MEDICINE

## 2022-01-01 PROCEDURE — 83735 ASSAY OF MAGNESIUM: CPT | Mod: NTX | Performed by: STUDENT IN AN ORGANIZED HEALTH CARE EDUCATION/TRAINING PROGRAM

## 2022-01-01 PROCEDURE — 99233 PR SUBSEQUENT HOSPITAL CARE,LEVL III: ICD-10-PCS | Mod: NTX,,, | Performed by: INTERNAL MEDICINE

## 2022-01-01 PROCEDURE — 85730 THROMBOPLASTIN TIME PARTIAL: CPT | Mod: NTX | Performed by: STUDENT IN AN ORGANIZED HEALTH CARE EDUCATION/TRAINING PROGRAM

## 2022-01-01 PROCEDURE — 25000003 PHARM REV CODE 250: Mod: NTX | Performed by: NURSE ANESTHETIST, CERTIFIED REGISTERED

## 2022-01-01 PROCEDURE — 20000000 HC ICU ROOM: Mod: NTX

## 2022-01-01 PROCEDURE — 99233 SBSQ HOSP IP/OBS HIGH 50: CPT | Mod: GC,NTX,, | Performed by: STUDENT IN AN ORGANIZED HEALTH CARE EDUCATION/TRAINING PROGRAM

## 2022-01-01 PROCEDURE — 93010 ELECTROCARDIOGRAM REPORT: CPT | Mod: NTX,,, | Performed by: INTERNAL MEDICINE

## 2022-01-01 PROCEDURE — 83880 ASSAY OF NATRIURETIC PEPTIDE: CPT | Performed by: STUDENT IN AN ORGANIZED HEALTH CARE EDUCATION/TRAINING PROGRAM

## 2022-01-01 PROCEDURE — 97116 GAIT TRAINING THERAPY: CPT | Mod: NTX

## 2022-01-01 PROCEDURE — 84484 ASSAY OF TROPONIN QUANT: CPT | Performed by: STUDENT IN AN ORGANIZED HEALTH CARE EDUCATION/TRAINING PROGRAM

## 2022-01-01 PROCEDURE — 82248 BILIRUBIN DIRECT: CPT | Mod: NTX | Performed by: INTERNAL MEDICINE

## 2022-01-01 PROCEDURE — 83880 ASSAY OF NATRIURETIC PEPTIDE: CPT | Performed by: INTERNAL MEDICINE

## 2022-01-01 PROCEDURE — 85347 COAGULATION TIME ACTIVATED: CPT | Mod: NTX

## 2022-01-01 PROCEDURE — 31500 AD HOC INTUBATION: ICD-10-PCS | Mod: NTX,,, | Performed by: ANESTHESIOLOGY

## 2022-01-01 PROCEDURE — 99239 HOSP IP/OBS DSCHRG MGMT >30: CPT | Mod: GC,,, | Performed by: INTERNAL MEDICINE

## 2022-01-01 PROCEDURE — A4216 STERILE WATER/SALINE, 10 ML: HCPCS | Mod: NTX | Performed by: INTERNAL MEDICINE

## 2022-01-01 PROCEDURE — 83880 ASSAY OF NATRIURETIC PEPTIDE: CPT | Mod: TXP | Performed by: INTERNAL MEDICINE

## 2022-01-01 PROCEDURE — 25000003 PHARM REV CODE 250: Performed by: STUDENT IN AN ORGANIZED HEALTH CARE EDUCATION/TRAINING PROGRAM

## 2022-01-01 PROCEDURE — 83605 ASSAY OF LACTIC ACID: CPT | Mod: NTX | Performed by: EMERGENCY MEDICINE

## 2022-01-01 PROCEDURE — 85730 THROMBOPLASTIN TIME PARTIAL: CPT | Mod: NTX | Performed by: INTERNAL MEDICINE

## 2022-01-01 PROCEDURE — 99233 PR SUBSEQUENT HOSPITAL CARE,LEVL III: ICD-10-PCS | Mod: GC,NTX,, | Performed by: INTERNAL MEDICINE

## 2022-01-01 PROCEDURE — 63600175 PHARM REV CODE 636 W HCPCS: Mod: NTX

## 2022-01-01 PROCEDURE — 3044F HG A1C LEVEL LT 7.0%: CPT | Mod: CPTII,NTX,S$GLB, | Performed by: INTERNAL MEDICINE

## 2022-01-01 PROCEDURE — 99222 1ST HOSP IP/OBS MODERATE 55: CPT | Mod: AI,NTX,, | Performed by: INTERNAL MEDICINE

## 2022-01-01 PROCEDURE — 85397 CLOTTING FUNCT ACTIVITY: CPT | Mod: NTX | Performed by: INTERNAL MEDICINE

## 2022-01-01 PROCEDURE — 80053 COMPREHEN METABOLIC PANEL: CPT | Performed by: STUDENT IN AN ORGANIZED HEALTH CARE EDUCATION/TRAINING PROGRAM

## 2022-01-01 PROCEDURE — 85730 THROMBOPLASTIN TIME PARTIAL: CPT | Mod: 91,NTX | Performed by: STUDENT IN AN ORGANIZED HEALTH CARE EDUCATION/TRAINING PROGRAM

## 2022-01-01 PROCEDURE — 84100 ASSAY OF PHOSPHORUS: CPT | Performed by: INTERNAL MEDICINE

## 2022-01-01 PROCEDURE — 99900035 HC TECH TIME PER 15 MIN (STAT)

## 2022-01-01 PROCEDURE — 3288F FALL RISK ASSESSMENT DOCD: CPT | Mod: CPTII,S$GLB,, | Performed by: INTERNAL MEDICINE

## 2022-01-01 PROCEDURE — 1126F PR PAIN SEVERITY QUANTIFIED, NO PAIN PRESENT: ICD-10-PCS | Mod: CPTII,S$GLB,, | Performed by: NURSE PRACTITIONER

## 2022-01-01 PROCEDURE — 85384 FIBRINOGEN ACTIVITY: CPT | Mod: TXP | Performed by: INTERNAL MEDICINE

## 2022-01-01 PROCEDURE — 83735 ASSAY OF MAGNESIUM: CPT | Mod: TXP | Performed by: INTERNAL MEDICINE

## 2022-01-01 PROCEDURE — 99291 PR CRITICAL CARE, E/M 30-74 MINUTES: ICD-10-PCS | Mod: NTX,,, | Performed by: INTERNAL MEDICINE

## 2022-01-01 PROCEDURE — 3008F BODY MASS INDEX DOCD: CPT | Mod: CPTII,S$GLB,, | Performed by: NURSE PRACTITIONER

## 2022-01-01 PROCEDURE — 83605 ASSAY OF LACTIC ACID: CPT | Mod: 91,NTX | Performed by: STUDENT IN AN ORGANIZED HEALTH CARE EDUCATION/TRAINING PROGRAM

## 2022-01-01 PROCEDURE — 1101F PT FALLS ASSESS-DOCD LE1/YR: CPT | Mod: CPTII,S$GLB,TXP, | Performed by: THORACIC SURGERY (CARDIOTHORACIC VASCULAR SURGERY)

## 2022-01-01 PROCEDURE — 87040 BLOOD CULTURE FOR BACTERIA: CPT | Mod: 59,NTX | Performed by: STUDENT IN AN ORGANIZED HEALTH CARE EDUCATION/TRAINING PROGRAM

## 2022-01-01 PROCEDURE — 3074F SYST BP LT 130 MM HG: CPT | Mod: CPTII,NTX,S$GLB, | Performed by: STUDENT IN AN ORGANIZED HEALTH CARE EDUCATION/TRAINING PROGRAM

## 2022-01-01 PROCEDURE — 99999 PR PBB SHADOW E&M-EST. PATIENT-LVL III: ICD-10-PCS | Mod: PBBFAC,TXP,, | Performed by: STUDENT IN AN ORGANIZED HEALTH CARE EDUCATION/TRAINING PROGRAM

## 2022-01-01 PROCEDURE — 25000003 PHARM REV CODE 250

## 2022-01-01 PROCEDURE — 83735 ASSAY OF MAGNESIUM: CPT | Mod: 91,NTX | Performed by: STUDENT IN AN ORGANIZED HEALTH CARE EDUCATION/TRAINING PROGRAM

## 2022-01-01 PROCEDURE — 1111F DSCHRG MED/CURRENT MED MERGE: CPT | Mod: CPTII,NTX,S$GLB, | Performed by: STUDENT IN AN ORGANIZED HEALTH CARE EDUCATION/TRAINING PROGRAM

## 2022-01-01 PROCEDURE — 93010 EKG 12-LEAD: ICD-10-PCS | Mod: ,,, | Performed by: INTERNAL MEDICINE

## 2022-01-01 PROCEDURE — 93922 ANKLE BRACHIAL INDICES (ABI): ICD-10-PCS | Mod: 26,NTX,, | Performed by: INTERNAL MEDICINE

## 2022-01-01 PROCEDURE — 36410 VNPNXR 3YR/> PHY/QHP DX/THER: CPT | Mod: NTX

## 2022-01-01 PROCEDURE — 97535 SELF CARE MNGMENT TRAINING: CPT | Mod: NTX

## 2022-01-01 PROCEDURE — 83605 ASSAY OF LACTIC ACID: CPT | Mod: NTX | Performed by: STUDENT IN AN ORGANIZED HEALTH CARE EDUCATION/TRAINING PROGRAM

## 2022-01-01 PROCEDURE — 83735 ASSAY OF MAGNESIUM: CPT | Mod: NTX | Performed by: INTERNAL MEDICINE

## 2022-01-01 PROCEDURE — 85025 COMPLETE CBC W/AUTO DIFF WBC: CPT | Mod: NTX | Performed by: INTERNAL MEDICINE

## 2022-01-01 PROCEDURE — 33241 PR RMV PULSE GENERATOR,SNGL/DUAL: ICD-10-PCS | Mod: 51,GC,NTX, | Performed by: STUDENT IN AN ORGANIZED HEALTH CARE EDUCATION/TRAINING PROGRAM

## 2022-01-01 PROCEDURE — C1769 GUIDE WIRE: HCPCS | Mod: NTX | Performed by: STUDENT IN AN ORGANIZED HEALTH CARE EDUCATION/TRAINING PROGRAM

## 2022-01-01 PROCEDURE — 88305 TISSUE EXAM BY PATHOLOGIST: CPT | Mod: TXP | Performed by: PATHOLOGY

## 2022-01-01 PROCEDURE — 84550 ASSAY OF BLOOD/URIC ACID: CPT | Performed by: STUDENT IN AN ORGANIZED HEALTH CARE EDUCATION/TRAINING PROGRAM

## 2022-01-01 PROCEDURE — 3078F DIAST BP <80 MM HG: CPT | Mod: CPTII,NTX,S$GLB, | Performed by: INTERNAL MEDICINE

## 2022-01-01 PROCEDURE — 83735 ASSAY OF MAGNESIUM: CPT | Performed by: STUDENT IN AN ORGANIZED HEALTH CARE EDUCATION/TRAINING PROGRAM

## 2022-01-01 PROCEDURE — 36415 COLL VENOUS BLD VENIPUNCTURE: CPT | Performed by: INTERNAL MEDICINE

## 2022-01-01 PROCEDURE — 1126F PR PAIN SEVERITY QUANTIFIED, NO PAIN PRESENT: ICD-10-PCS | Mod: CPTII,NTX,S$GLB, | Performed by: INTERNAL MEDICINE

## 2022-01-01 PROCEDURE — 1126F AMNT PAIN NOTED NONE PRSNT: CPT | Mod: CPTII,S$GLB,TXP, | Performed by: THORACIC SURGERY (CARDIOTHORACIC VASCULAR SURGERY)

## 2022-01-01 PROCEDURE — 85025 COMPLETE CBC W/AUTO DIFF WBC: CPT | Mod: TXP | Performed by: INTERNAL MEDICINE

## 2022-01-01 PROCEDURE — 1101F PT FALLS ASSESS-DOCD LE1/YR: CPT | Mod: CPTII,NTX,S$GLB, | Performed by: INTERNAL MEDICINE

## 2022-01-01 PROCEDURE — 27000221 HC OXYGEN, UP TO 24 HOURS: Mod: NTX

## 2022-01-01 PROCEDURE — 1160F PR REVIEW ALL MEDS BY PRESCRIBER/CLIN PHARMACIST DOCUMENTED: ICD-10-PCS | Mod: CPTII,S$GLB,, | Performed by: INTERNAL MEDICINE

## 2022-01-01 PROCEDURE — 4010F PR ACE/ARB THEARPY RXD/TAKEN: ICD-10-PCS | Mod: CPTII,S$GLB,TXP, | Performed by: THORACIC SURGERY (CARDIOTHORACIC VASCULAR SURGERY)

## 2022-01-01 PROCEDURE — 20600001 HC STEP DOWN PRIVATE ROOM

## 2022-01-01 PROCEDURE — 82800 BLOOD PH: CPT | Mod: NTX

## 2022-01-01 PROCEDURE — 43242 EGD US FINE NEEDLE BX/ASPIR: CPT | Mod: NTX,,, | Performed by: INTERNAL MEDICINE

## 2022-01-01 PROCEDURE — 99281 EMR DPT VST MAYX REQ PHY/QHP: CPT | Mod: NTX

## 2022-01-01 PROCEDURE — 96366 THER/PROPH/DIAG IV INF ADDON: CPT

## 2022-01-01 PROCEDURE — 84484 ASSAY OF TROPONIN QUANT: CPT | Mod: 91,NTX | Performed by: EMERGENCY MEDICINE

## 2022-01-01 PROCEDURE — 63600175 PHARM REV CODE 636 W HCPCS: Mod: NTX | Performed by: INTERNAL MEDICINE

## 2022-01-01 PROCEDURE — 99499 UNLISTED E&M SERVICE: CPT | Mod: S$GLB,,, | Performed by: INTERNAL MEDICINE

## 2022-01-01 PROCEDURE — C1751 CATH, INF, PER/CENT/MIDLINE: HCPCS | Mod: NTX | Performed by: ANESTHESIOLOGY

## 2022-01-01 PROCEDURE — 94761 N-INVAS EAR/PLS OXIMETRY MLT: CPT

## 2022-01-01 PROCEDURE — 63600175 PHARM REV CODE 636 W HCPCS: Mod: NTX | Performed by: NURSE ANESTHETIST, CERTIFIED REGISTERED

## 2022-01-01 PROCEDURE — 84100 ASSAY OF PHOSPHORUS: CPT | Mod: NTX | Performed by: INTERNAL MEDICINE

## 2022-01-01 PROCEDURE — 1101F PT FALLS ASSESS-DOCD LE1/YR: CPT | Mod: CPTII,S$GLB,, | Performed by: INTERNAL MEDICINE

## 2022-01-01 PROCEDURE — 80202 ASSAY OF VANCOMYCIN: CPT | Mod: NTX | Performed by: INTERNAL MEDICINE

## 2022-01-01 PROCEDURE — 99284 EMERGENCY DEPT VISIT MOD MDM: CPT | Mod: NTX,,, | Performed by: STUDENT IN AN ORGANIZED HEALTH CARE EDUCATION/TRAINING PROGRAM

## 2022-01-01 PROCEDURE — U0005 INFEC AGEN DETEC AMPLI PROBE: HCPCS | Performed by: EMERGENCY MEDICINE

## 2022-01-01 PROCEDURE — G0180 PR HOME HEALTH MD CERTIFICATION: ICD-10-PCS | Mod: ,,, | Performed by: INTERNAL MEDICINE

## 2022-01-01 PROCEDURE — 99281 EMR DPT VST MAYX REQ PHY/QHP: CPT

## 2022-01-01 PROCEDURE — 87040 BLOOD CULTURE FOR BACTERIA: CPT | Mod: 59,NTX

## 2022-01-01 PROCEDURE — 81001 URINALYSIS AUTO W/SCOPE: CPT | Mod: NTX | Performed by: STUDENT IN AN ORGANIZED HEALTH CARE EDUCATION/TRAINING PROGRAM

## 2022-01-01 PROCEDURE — 99024 PR POST-OP FOLLOW-UP VISIT: ICD-10-PCS | Mod: GC,NTX,, | Performed by: STUDENT IN AN ORGANIZED HEALTH CARE EDUCATION/TRAINING PROGRAM

## 2022-01-01 PROCEDURE — 96361 HYDRATE IV INFUSION ADD-ON: CPT | Mod: NTX

## 2022-01-01 PROCEDURE — 25000003 PHARM REV CODE 250: Mod: TXP | Performed by: NURSE ANESTHETIST, CERTIFIED REGISTERED

## 2022-01-01 PROCEDURE — 4010F PR ACE/ARB THEARPY RXD/TAKEN: ICD-10-PCS | Mod: CPTII,NTX,S$GLB, | Performed by: INTERNAL MEDICINE

## 2022-01-01 PROCEDURE — 36415 COLL VENOUS BLD VENIPUNCTURE: CPT | Mod: NTX | Performed by: INTERNAL MEDICINE

## 2022-01-01 PROCEDURE — 82746 ASSAY OF FOLIC ACID SERUM: CPT | Mod: NTX | Performed by: STUDENT IN AN ORGANIZED HEALTH CARE EDUCATION/TRAINING PROGRAM

## 2022-01-01 PROCEDURE — 85025 COMPLETE CBC W/AUTO DIFF WBC: CPT | Mod: NTX | Performed by: EMERGENCY MEDICINE

## 2022-01-01 PROCEDURE — 3008F BODY MASS INDEX DOCD: CPT | Mod: CPTII,S$GLB,TXP, | Performed by: THORACIC SURGERY (CARDIOTHORACIC VASCULAR SURGERY)

## 2022-01-01 PROCEDURE — 83880 ASSAY OF NATRIURETIC PEPTIDE: CPT | Mod: NTX | Performed by: INTERNAL MEDICINE

## 2022-01-01 PROCEDURE — 99291 CRITICAL CARE FIRST HOUR: CPT | Mod: NTX,,, | Performed by: INTERNAL MEDICINE

## 2022-01-01 PROCEDURE — 97165 OT EVAL LOW COMPLEX 30 MIN: CPT | Mod: NTX

## 2022-01-01 PROCEDURE — 1159F PR MEDICATION LIST DOCUMENTED IN MEDICAL RECORD: ICD-10-PCS | Mod: CPTII,NTX,S$GLB, | Performed by: INTERNAL MEDICINE

## 2022-01-01 PROCEDURE — 1111F PR DISCHARGE MEDS RECONCILED W/ CURRENT OUTPATIENT MED LIST: ICD-10-PCS | Mod: CPTII,S$GLB,, | Performed by: INTERNAL MEDICINE

## 2022-01-01 PROCEDURE — 94010 BREATHING CAPACITY TEST: ICD-10-PCS | Mod: NTX,S$GLB,, | Performed by: INTERNAL MEDICINE

## 2022-01-01 PROCEDURE — 93010 EKG 12-LEAD: ICD-10-PCS | Mod: NTX,,, | Performed by: INTERNAL MEDICINE

## 2022-01-01 PROCEDURE — D9220A PRA ANESTHESIA: Mod: CRNA,NTX,, | Performed by: NURSE ANESTHETIST, CERTIFIED REGISTERED

## 2022-01-01 PROCEDURE — U0002 COVID-19 LAB TEST NON-CDC: HCPCS | Mod: NTX | Performed by: STUDENT IN AN ORGANIZED HEALTH CARE EDUCATION/TRAINING PROGRAM

## 2022-01-01 PROCEDURE — 3008F BODY MASS INDEX DOCD: CPT | Mod: CPTII,NTX,S$GLB, | Performed by: INTERNAL MEDICINE

## 2022-01-01 PROCEDURE — 97530 THERAPEUTIC ACTIVITIES: CPT | Mod: NTX

## 2022-01-01 PROCEDURE — 3288F PR FALLS RISK ASSESSMENT DOCUMENTED: ICD-10-PCS | Mod: CPTII,S$GLB,TXP, | Performed by: THORACIC SURGERY (CARDIOTHORACIC VASCULAR SURGERY)

## 2022-01-01 PROCEDURE — 99900026 HC AIRWAY MAINTENANCE (STAT): Mod: NTX

## 2022-01-01 PROCEDURE — 87040 BLOOD CULTURE FOR BACTERIA: CPT | Mod: 59 | Performed by: STUDENT IN AN ORGANIZED HEALTH CARE EDUCATION/TRAINING PROGRAM

## 2022-01-01 PROCEDURE — 3078F PR MOST RECENT DIASTOLIC BLOOD PRESSURE < 80 MM HG: ICD-10-PCS | Mod: CPTII,NTX,S$GLB, | Performed by: STUDENT IN AN ORGANIZED HEALTH CARE EDUCATION/TRAINING PROGRAM

## 2022-01-01 PROCEDURE — 25500020 PHARM REV CODE 255: Performed by: EMERGENCY MEDICINE

## 2022-01-01 PROCEDURE — 99233 PR SUBSEQUENT HOSPITAL CARE,LEVL III: ICD-10-PCS | Mod: GC,NTX,, | Performed by: STUDENT IN AN ORGANIZED HEALTH CARE EDUCATION/TRAINING PROGRAM

## 2022-01-01 PROCEDURE — 1159F MED LIST DOCD IN RCRD: CPT | Mod: CPTII,NTX,S$GLB, | Performed by: INTERNAL MEDICINE

## 2022-01-01 PROCEDURE — 3044F PR MOST RECENT HEMOGLOBIN A1C LEVEL <7.0%: ICD-10-PCS | Mod: CPTII,NTX,S$GLB, | Performed by: STUDENT IN AN ORGANIZED HEALTH CARE EDUCATION/TRAINING PROGRAM

## 2022-01-01 PROCEDURE — 99291 PR CRITICAL CARE, E/M 30-74 MINUTES: ICD-10-PCS | Mod: NTX,,, | Performed by: PHYSICIAN ASSISTANT

## 2022-01-01 PROCEDURE — 83605 ASSAY OF LACTIC ACID: CPT | Mod: NTX

## 2022-01-01 PROCEDURE — 99499 RISK ADDL DX/OHS AUDIT: ICD-10-PCS | Mod: S$GLB,,,

## 2022-01-01 PROCEDURE — 27000207 HC ISOLATION

## 2022-01-01 PROCEDURE — 99223 PR INITIAL HOSPITAL CARE,LEVL III: ICD-10-PCS | Mod: NTX,,, | Performed by: STUDENT IN AN ORGANIZED HEALTH CARE EDUCATION/TRAINING PROGRAM

## 2022-01-01 PROCEDURE — 97168 OT RE-EVAL EST PLAN CARE: CPT | Mod: NTX

## 2022-01-01 PROCEDURE — 80053 COMPREHEN METABOLIC PANEL: CPT | Mod: 91,NTX

## 2022-01-01 PROCEDURE — 99999 PR PBB SHADOW E&M-EST. PATIENT-LVL III: CPT | Mod: PBBFAC,,, | Performed by: INTERNAL MEDICINE

## 2022-01-01 PROCEDURE — 99223 PR INITIAL HOSPITAL CARE,LEVL III: ICD-10-PCS | Mod: GC,NTX,, | Performed by: STUDENT IN AN ORGANIZED HEALTH CARE EDUCATION/TRAINING PROGRAM

## 2022-01-01 PROCEDURE — 3288F PR FALLS RISK ASSESSMENT DOCUMENTED: ICD-10-PCS | Mod: CPTII,S$GLB,, | Performed by: NURSE PRACTITIONER

## 2022-01-01 PROCEDURE — 81001 URINALYSIS AUTO W/SCOPE: CPT | Mod: NTX

## 2022-01-01 PROCEDURE — 83880 ASSAY OF NATRIURETIC PEPTIDE: CPT | Mod: 91,NTX | Performed by: EMERGENCY MEDICINE

## 2022-01-01 PROCEDURE — 83605 ASSAY OF LACTIC ACID: CPT | Mod: NTX | Performed by: INTERNAL MEDICINE

## 2022-01-01 PROCEDURE — 25000003 PHARM REV CODE 250: Mod: NTX | Performed by: EMERGENCY MEDICINE

## 2022-01-01 PROCEDURE — 99238 HOSP IP/OBS DSCHRG MGMT 30/<: CPT | Mod: ,,, | Performed by: INTERNAL MEDICINE

## 2022-01-01 PROCEDURE — 99233 SBSQ HOSP IP/OBS HIGH 50: CPT | Mod: NTX,,, | Performed by: STUDENT IN AN ORGANIZED HEALTH CARE EDUCATION/TRAINING PROGRAM

## 2022-01-01 PROCEDURE — 51702 INSERT TEMP BLADDER CATH: CPT | Mod: NTX

## 2022-01-01 PROCEDURE — 85610 PROTHROMBIN TIME: CPT | Mod: NTX | Performed by: STUDENT IN AN ORGANIZED HEALTH CARE EDUCATION/TRAINING PROGRAM

## 2022-01-01 PROCEDURE — 83880 ASSAY OF NATRIURETIC PEPTIDE: CPT | Mod: NTX | Performed by: STUDENT IN AN ORGANIZED HEALTH CARE EDUCATION/TRAINING PROGRAM

## 2022-01-01 PROCEDURE — 93005 ELECTROCARDIOGRAM TRACING: CPT | Mod: NTX

## 2022-01-01 PROCEDURE — 99233 SBSQ HOSP IP/OBS HIGH 50: CPT | Mod: GC,,, | Performed by: INTERNAL MEDICINE

## 2022-01-01 PROCEDURE — D9220A PRA ANESTHESIA: Mod: ANES,NTX,, | Performed by: STUDENT IN AN ORGANIZED HEALTH CARE EDUCATION/TRAINING PROGRAM

## 2022-01-01 PROCEDURE — 84443 ASSAY THYROID STIM HORMONE: CPT | Mod: NTX | Performed by: STUDENT IN AN ORGANIZED HEALTH CARE EDUCATION/TRAINING PROGRAM

## 2022-01-01 PROCEDURE — 87150 DNA/RNA AMPLIFIED PROBE: CPT | Mod: NTX | Performed by: STUDENT IN AN ORGANIZED HEALTH CARE EDUCATION/TRAINING PROGRAM

## 2022-01-01 PROCEDURE — 1159F PR MEDICATION LIST DOCUMENTED IN MEDICAL RECORD: ICD-10-PCS | Mod: CPTII,S$GLB,TXP, | Performed by: THORACIC SURGERY (CARDIOTHORACIC VASCULAR SURGERY)

## 2022-01-01 PROCEDURE — 99214 OFFICE O/P EST MOD 30 MIN: CPT | Mod: GC,NTX,, | Performed by: INTERNAL MEDICINE

## 2022-01-01 PROCEDURE — D9220A PRA ANESTHESIA: ICD-10-PCS | Mod: ANES,NTX,, | Performed by: INTERNAL MEDICINE

## 2022-01-01 PROCEDURE — D9220A PRA ANESTHESIA: ICD-10-PCS | Mod: PT,ANES,NTX, | Performed by: ANESTHESIOLOGY

## 2022-01-01 PROCEDURE — 81003 URINALYSIS AUTO W/O SCOPE: CPT | Mod: NTX | Performed by: STUDENT IN AN ORGANIZED HEALTH CARE EDUCATION/TRAINING PROGRAM

## 2022-01-01 PROCEDURE — 99999 PR PBB SHADOW E&M-EST. PATIENT-LVL IV: ICD-10-PCS | Mod: PBBFAC,TXP,, | Performed by: INTERNAL MEDICINE

## 2022-01-01 PROCEDURE — 63700000 PHARM REV CODE 250 ALT 637 W/O HCPCS: Mod: NTX | Performed by: STUDENT IN AN ORGANIZED HEALTH CARE EDUCATION/TRAINING PROGRAM

## 2022-01-01 PROCEDURE — 99285 EMERGENCY DEPT VISIT HI MDM: CPT | Mod: GC,CS,, | Performed by: EMERGENCY MEDICINE

## 2022-01-01 PROCEDURE — U0003 INFECTIOUS AGENT DETECTION BY NUCLEIC ACID (DNA OR RNA); SEVERE ACUTE RESPIRATORY SYNDROME CORONAVIRUS 2 (SARS-COV-2) (CORONAVIRUS DISEASE [COVID-19]), AMPLIFIED PROBE TECHNIQUE, MAKING USE OF HIGH THROUGHPUT TECHNOLOGIES AS DESCRIBED BY CMS-2020-01-R: HCPCS | Mod: NTX | Performed by: STUDENT IN AN ORGANIZED HEALTH CARE EDUCATION/TRAINING PROGRAM

## 2022-01-01 PROCEDURE — 93010 ELECTROCARDIOGRAM REPORT: CPT | Mod: ,,, | Performed by: INTERNAL MEDICINE

## 2022-01-01 PROCEDURE — 85025 COMPLETE CBC W/AUTO DIFF WBC: CPT | Mod: 91,NTX | Performed by: STUDENT IN AN ORGANIZED HEALTH CARE EDUCATION/TRAINING PROGRAM

## 2022-01-01 PROCEDURE — 80048 BASIC METABOLIC PNL TOTAL CA: CPT | Mod: NTX,XB | Performed by: STUDENT IN AN ORGANIZED HEALTH CARE EDUCATION/TRAINING PROGRAM

## 2022-01-01 PROCEDURE — 84100 ASSAY OF PHOSPHORUS: CPT | Performed by: STUDENT IN AN ORGANIZED HEALTH CARE EDUCATION/TRAINING PROGRAM

## 2022-01-01 PROCEDURE — 99497 PR ADVNCD CARE PLAN 30 MIN: ICD-10-PCS | Mod: NTX,S$GLB,, | Performed by: STUDENT IN AN ORGANIZED HEALTH CARE EDUCATION/TRAINING PROGRAM

## 2022-01-01 PROCEDURE — 84439 ASSAY OF FREE THYROXINE: CPT | Mod: NTX | Performed by: STUDENT IN AN ORGANIZED HEALTH CARE EDUCATION/TRAINING PROGRAM

## 2022-01-01 PROCEDURE — 99291 CRITICAL CARE FIRST HOUR: CPT | Mod: 25

## 2022-01-01 PROCEDURE — 71046 XR CHEST PA AND LATERAL: ICD-10-PCS | Mod: 26,NTX,, | Performed by: RADIOLOGY

## 2022-01-01 PROCEDURE — 85027 COMPLETE CBC AUTOMATED: CPT | Performed by: INTERNAL MEDICINE

## 2022-01-01 PROCEDURE — C1751 CATH, INF, PER/CENT/MIDLINE: HCPCS | Mod: NTX

## 2022-01-01 PROCEDURE — 80053 COMPREHEN METABOLIC PANEL: CPT | Mod: NTX | Performed by: INTERNAL MEDICINE

## 2022-01-01 PROCEDURE — 99999 PR PBB SHADOW E&M-EST. PATIENT-LVL IV: CPT | Mod: PBBFAC,TXP,, | Performed by: THORACIC SURGERY (CARDIOTHORACIC VASCULAR SURGERY)

## 2022-01-01 PROCEDURE — 37000008 HC ANESTHESIA 1ST 15 MINUTES: Mod: NTX | Performed by: INTERNAL MEDICINE

## 2022-01-01 PROCEDURE — 99999 PR PBB SHADOW E&M-EST. PATIENT-LVL IV: CPT | Mod: PBBFAC,,, | Performed by: INTERNAL MEDICINE

## 2022-01-01 PROCEDURE — 93295 CARDIAC DEVICE CHECK - REMOTE: ICD-10-PCS | Mod: NTX,,, | Performed by: INTERNAL MEDICINE

## 2022-01-01 PROCEDURE — 85025 COMPLETE CBC W/AUTO DIFF WBC: CPT | Performed by: STUDENT IN AN ORGANIZED HEALTH CARE EDUCATION/TRAINING PROGRAM

## 2022-01-01 PROCEDURE — 97535 SELF CARE MNGMENT TRAINING: CPT

## 2022-01-01 PROCEDURE — 83735 ASSAY OF MAGNESIUM: CPT | Performed by: INTERNAL MEDICINE

## 2022-01-01 PROCEDURE — 80061 LIPID PANEL: CPT | Mod: NTX | Performed by: INTERNAL MEDICINE

## 2022-01-01 PROCEDURE — 99284 EMERGENCY DEPT VISIT MOD MDM: CPT | Mod: GC,CS,, | Performed by: EMERGENCY MEDICINE

## 2022-01-01 PROCEDURE — 84466 ASSAY OF TRANSFERRIN: CPT | Mod: NTX | Performed by: STUDENT IN AN ORGANIZED HEALTH CARE EDUCATION/TRAINING PROGRAM

## 2022-01-01 PROCEDURE — 3074F SYST BP LT 130 MM HG: CPT | Mod: CPTII,S$GLB,, | Performed by: NURSE PRACTITIONER

## 2022-01-01 PROCEDURE — 1111F DSCHRG MED/CURRENT MED MERGE: CPT | Mod: CPTII,S$GLB,, | Performed by: INTERNAL MEDICINE

## 2022-01-01 PROCEDURE — 36573 INSJ PICC RS&I 5 YR+: CPT | Mod: NTX

## 2022-01-01 PROCEDURE — 1160F RVW MEDS BY RX/DR IN RCRD: CPT | Mod: CPTII,NTX,S$GLB, | Performed by: INTERNAL MEDICINE

## 2022-01-01 PROCEDURE — 99233 SBSQ HOSP IP/OBS HIGH 50: CPT | Mod: GC,,, | Performed by: HOSPITALIST

## 2022-01-01 PROCEDURE — 94660 CPAP INITIATION&MGMT: CPT | Mod: NTX

## 2022-01-01 PROCEDURE — 83880 ASSAY OF NATRIURETIC PEPTIDE: CPT | Mod: NTX

## 2022-01-01 PROCEDURE — 99499 UNLISTED E&M SERVICE: CPT | Mod: S$GLB,,, | Performed by: NURSE PRACTITIONER

## 2022-01-01 PROCEDURE — 99214 OFFICE O/P EST MOD 30 MIN: CPT | Mod: S$GLB,,, | Performed by: INTERNAL MEDICINE

## 2022-01-01 PROCEDURE — 76937 US GUIDE VASCULAR ACCESS: CPT | Mod: NTX

## 2022-01-01 PROCEDURE — 36620 ARTERIAL LINE: ICD-10-PCS | Mod: NTX,,, | Performed by: INTERNAL MEDICINE

## 2022-01-01 PROCEDURE — 25500020 PHARM REV CODE 255: Mod: NTX | Performed by: INTERNAL MEDICINE

## 2022-01-01 PROCEDURE — D9220A PRA ANESTHESIA: ICD-10-PCS | Mod: CRNA,NTX,, | Performed by: NURSE ANESTHETIST, CERTIFIED REGISTERED

## 2022-01-01 PROCEDURE — 3074F PR MOST RECENT SYSTOLIC BLOOD PRESSURE < 130 MM HG: ICD-10-PCS | Mod: CPTII,NTX,S$GLB, | Performed by: INTERNAL MEDICINE

## 2022-01-01 PROCEDURE — 80053 COMPREHEN METABOLIC PANEL: CPT | Performed by: INTERNAL MEDICINE

## 2022-01-01 PROCEDURE — 99233 PR SUBSEQUENT HOSPITAL CARE,LEVL III: ICD-10-PCS | Mod: GC,,, | Performed by: INTERNAL MEDICINE

## 2022-01-01 PROCEDURE — 63600175 PHARM REV CODE 636 W HCPCS: Mod: NTX | Performed by: ANESTHESIOLOGY

## 2022-01-01 PROCEDURE — 99238 PR HOSPITAL DISCHARGE DAY,<30 MIN: ICD-10-PCS | Mod: ,,, | Performed by: INTERNAL MEDICINE

## 2022-01-01 PROCEDURE — 1126F PR PAIN SEVERITY QUANTIFIED, NO PAIN PRESENT: ICD-10-PCS | Mod: CPTII,S$GLB,, | Performed by: INTERNAL MEDICINE

## 2022-01-01 PROCEDURE — D9220A PRA ANESTHESIA: Mod: PT,ANES,NTX, | Performed by: ANESTHESIOLOGY

## 2022-01-01 PROCEDURE — 83036 HEMOGLOBIN GLYCOSYLATED A1C: CPT | Mod: NTX | Performed by: STUDENT IN AN ORGANIZED HEALTH CARE EDUCATION/TRAINING PROGRAM

## 2022-01-01 PROCEDURE — 84484 ASSAY OF TROPONIN QUANT: CPT | Mod: 91,NTX | Performed by: STUDENT IN AN ORGANIZED HEALTH CARE EDUCATION/TRAINING PROGRAM

## 2022-01-01 PROCEDURE — 93005 ELECTROCARDIOGRAM TRACING: CPT

## 2022-01-01 PROCEDURE — 87088 URINE BACTERIA CULTURE: CPT | Performed by: STUDENT IN AN ORGANIZED HEALTH CARE EDUCATION/TRAINING PROGRAM

## 2022-01-01 PROCEDURE — 86901 BLOOD TYPING SEROLOGIC RH(D): CPT | Mod: NTX | Performed by: STUDENT IN AN ORGANIZED HEALTH CARE EDUCATION/TRAINING PROGRAM

## 2022-01-01 PROCEDURE — 93306 ECHO (CUPID ONLY): ICD-10-PCS | Mod: 26,NTX,, | Performed by: INTERNAL MEDICINE

## 2022-01-01 PROCEDURE — 88112 CYTOPATH CELL ENHANCE TECH: CPT | Mod: 26,NTX,, | Performed by: PATHOLOGY

## 2022-01-01 PROCEDURE — 99223 1ST HOSP IP/OBS HIGH 75: CPT | Mod: GC,,, | Performed by: INTERNAL MEDICINE

## 2022-01-01 PROCEDURE — 99999 PR PBB SHADOW E&M-EST. PATIENT-LVL III: CPT | Mod: PBBFAC,TXP,, | Performed by: INTERNAL MEDICINE

## 2022-01-01 PROCEDURE — 3288F PR FALLS RISK ASSESSMENT DOCUMENTED: ICD-10-PCS | Mod: CPTII,NTX,S$GLB, | Performed by: STUDENT IN AN ORGANIZED HEALTH CARE EDUCATION/TRAINING PROGRAM

## 2022-01-01 PROCEDURE — 87502 INFLUENZA DNA AMP PROBE: CPT | Mod: 59

## 2022-01-01 PROCEDURE — U0002 COVID-19 LAB TEST NON-CDC: HCPCS | Performed by: EMERGENCY MEDICINE

## 2022-01-01 PROCEDURE — 99233 SBSQ HOSP IP/OBS HIGH 50: CPT | Mod: GC,NTX,, | Performed by: INTERNAL MEDICINE

## 2022-01-01 PROCEDURE — 84134 ASSAY OF PREALBUMIN: CPT | Mod: NTX | Performed by: INTERNAL MEDICINE

## 2022-01-01 PROCEDURE — 87070 CULTURE OTHR SPECIMN AEROBIC: CPT | Mod: NTX | Performed by: STUDENT IN AN ORGANIZED HEALTH CARE EDUCATION/TRAINING PROGRAM

## 2022-01-01 PROCEDURE — 1101F PR PT FALLS ASSESS DOC 0-1 FALLS W/OUT INJ PAST YR: ICD-10-PCS | Mod: CPTII,NTX,S$GLB, | Performed by: INTERNAL MEDICINE

## 2022-01-01 PROCEDURE — 3044F HG A1C LEVEL LT 7.0%: CPT | Mod: CPTII,S$GLB,, | Performed by: INTERNAL MEDICINE

## 2022-01-01 PROCEDURE — 3288F FALL RISK ASSESSMENT DOCD: CPT | Mod: CPTII,S$GLB,TXP, | Performed by: THORACIC SURGERY (CARDIOTHORACIC VASCULAR SURGERY)

## 2022-01-01 PROCEDURE — 84484 ASSAY OF TROPONIN QUANT: CPT | Mod: NTX | Performed by: EMERGENCY MEDICINE

## 2022-01-01 PROCEDURE — U0002 COVID-19 LAB TEST NON-CDC: HCPCS | Performed by: STUDENT IN AN ORGANIZED HEALTH CARE EDUCATION/TRAINING PROGRAM

## 2022-01-01 PROCEDURE — 1126F AMNT PAIN NOTED NONE PRSNT: CPT | Mod: CPTII,NTX,S$GLB, | Performed by: INTERNAL MEDICINE

## 2022-01-01 PROCEDURE — 84466 ASSAY OF TRANSFERRIN: CPT | Mod: TXP | Performed by: INTERNAL MEDICINE

## 2022-01-01 PROCEDURE — 63600175 PHARM REV CODE 636 W HCPCS: Mod: JG,NTX | Performed by: STUDENT IN AN ORGANIZED HEALTH CARE EDUCATION/TRAINING PROGRAM

## 2022-01-01 PROCEDURE — 83615 LACTATE (LD) (LDH) ENZYME: CPT | Mod: NTX | Performed by: INTERNAL MEDICINE

## 2022-01-01 PROCEDURE — 81240 F2 GENE: CPT | Mod: TXP | Performed by: INTERNAL MEDICINE

## 2022-01-01 PROCEDURE — 1160F RVW MEDS BY RX/DR IN RCRD: CPT | Mod: CPTII,S$GLB,, | Performed by: INTERNAL MEDICINE

## 2022-01-01 PROCEDURE — 3008F PR BODY MASS INDEX (BMI) DOCUMENTED: ICD-10-PCS | Mod: CPTII,S$GLB,, | Performed by: INTERNAL MEDICINE

## 2022-01-01 PROCEDURE — 96375 TX/PRO/DX INJ NEW DRUG ADDON: CPT

## 2022-01-01 PROCEDURE — 93922 UPR/L XTREMITY ART 2 LEVELS: CPT | Mod: 26,NTX,, | Performed by: INTERNAL MEDICINE

## 2022-01-01 PROCEDURE — 94010 BREATHING CAPACITY TEST: CPT | Mod: NTX,S$GLB,, | Performed by: INTERNAL MEDICINE

## 2022-01-01 PROCEDURE — 27200966 HC CLOSED SUCTION SYSTEM: Mod: NTX

## 2022-01-01 PROCEDURE — 94660 CPAP INITIATION&MGMT: CPT

## 2022-01-01 PROCEDURE — 3074F PR MOST RECENT SYSTOLIC BLOOD PRESSURE < 130 MM HG: ICD-10-PCS | Mod: CPTII,S$GLB,, | Performed by: NURSE PRACTITIONER

## 2022-01-01 PROCEDURE — C1894 INTRO/SHEATH, NON-LASER: HCPCS | Mod: TXP | Performed by: INTERNAL MEDICINE

## 2022-01-01 PROCEDURE — 82728 ASSAY OF FERRITIN: CPT | Mod: NTX | Performed by: STUDENT IN AN ORGANIZED HEALTH CARE EDUCATION/TRAINING PROGRAM

## 2022-01-01 PROCEDURE — 99291 CRITICAL CARE FIRST HOUR: CPT | Mod: NTX,,, | Performed by: PHYSICIAN ASSISTANT

## 2022-01-01 PROCEDURE — 93010 ELECTROCARDIOGRAM REPORT: CPT | Mod: NTX,,, | Performed by: STUDENT IN AN ORGANIZED HEALTH CARE EDUCATION/TRAINING PROGRAM

## 2022-01-01 PROCEDURE — 1101F PR PT FALLS ASSESS DOC 0-1 FALLS W/OUT INJ PAST YR: ICD-10-PCS | Mod: CPTII,S$GLB,TXP, | Performed by: THORACIC SURGERY (CARDIOTHORACIC VASCULAR SURGERY)

## 2022-01-01 PROCEDURE — 27201423 OPTIME MED/SURG SUP & DEVICES STERILE SUPPLY: Mod: NTX | Performed by: STUDENT IN AN ORGANIZED HEALTH CARE EDUCATION/TRAINING PROGRAM

## 2022-01-01 PROCEDURE — 25000003 PHARM REV CODE 250: Mod: NTX | Performed by: RADIOLOGY

## 2022-01-01 PROCEDURE — 3008F PR BODY MASS INDEX (BMI) DOCUMENTED: ICD-10-PCS | Mod: CPTII,NTX,S$GLB, | Performed by: INTERNAL MEDICINE

## 2022-01-01 PROCEDURE — 99214 OFFICE O/P EST MOD 30 MIN: CPT | Mod: S$GLB,,, | Performed by: NURSE PRACTITIONER

## 2022-01-01 PROCEDURE — 87493 C DIFF AMPLIFIED PROBE: CPT | Performed by: HOSPITALIST

## 2022-01-01 PROCEDURE — 1125F AMNT PAIN NOTED PAIN PRSNT: CPT | Mod: CPTII,NTX,S$GLB, | Performed by: STUDENT IN AN ORGANIZED HEALTH CARE EDUCATION/TRAINING PROGRAM

## 2022-01-01 PROCEDURE — 94002 VENT MGMT INPAT INIT DAY: CPT | Mod: NTX

## 2022-01-01 PROCEDURE — 25000003 PHARM REV CODE 250: Mod: TXP | Performed by: INTERNAL MEDICINE

## 2022-01-01 PROCEDURE — 3078F PR MOST RECENT DIASTOLIC BLOOD PRESSURE < 80 MM HG: ICD-10-PCS | Mod: CPTII,NTX,S$GLB, | Performed by: INTERNAL MEDICINE

## 2022-01-01 PROCEDURE — 94010 BREATHING CAPACITY TEST: CPT | Mod: NTX

## 2022-01-01 PROCEDURE — D9220A PRA ANESTHESIA: Mod: ANES,NTX,, | Performed by: INTERNAL MEDICINE

## 2022-01-01 PROCEDURE — 80047 BASIC METABLC PNL IONIZED CA: CPT | Mod: NTX,59

## 2022-01-01 PROCEDURE — 1160F PR REVIEW ALL MEDS BY PRESCRIBER/CLIN PHARMACIST DOCUMENTED: ICD-10-PCS | Mod: CPTII,NTX,S$GLB, | Performed by: INTERNAL MEDICINE

## 2022-01-01 PROCEDURE — 33241 REMOVE PULSE GENERATOR: CPT | Mod: 51,GC,NTX, | Performed by: STUDENT IN AN ORGANIZED HEALTH CARE EDUCATION/TRAINING PROGRAM

## 2022-01-01 PROCEDURE — 99284 EMERGENCY DEPT VISIT MOD MDM: CPT | Mod: ,,, | Performed by: EMERGENCY MEDICINE

## 2022-01-01 PROCEDURE — 94621 CARDIOPULMONARY EXERCISE TESTING (CUPID ONLY): ICD-10-PCS | Mod: 26,NTX,, | Performed by: INTERNAL MEDICINE

## 2022-01-01 PROCEDURE — 85025 COMPLETE CBC W/AUTO DIFF WBC: CPT | Mod: NTX

## 2022-01-01 PROCEDURE — 99285 EMERGENCY DEPT VISIT HI MDM: CPT | Mod: 25

## 2022-01-01 PROCEDURE — 87449 NOS EACH ORGANISM AG IA: CPT | Mod: NTX | Performed by: EMERGENCY MEDICINE

## 2022-01-01 PROCEDURE — 3078F DIAST BP <80 MM HG: CPT | Mod: CPTII,S$GLB,, | Performed by: NURSE PRACTITIONER

## 2022-01-01 PROCEDURE — 80053 COMPREHEN METABOLIC PANEL: CPT | Mod: 91,NTX | Performed by: INTERNAL MEDICINE

## 2022-01-01 PROCEDURE — 80047 BASIC METABLC PNL IONIZED CA: CPT

## 2022-01-01 PROCEDURE — 63600175 PHARM REV CODE 636 W HCPCS: Mod: NTX | Performed by: EMERGENCY MEDICINE

## 2022-01-01 PROCEDURE — 93306 TTE W/DOPPLER COMPLETE: CPT | Mod: 26,NTX,, | Performed by: INTERNAL MEDICINE

## 2022-01-01 PROCEDURE — 99239 HOSP IP/OBS DSCHRG MGMT >30: CPT | Mod: ,,, | Performed by: INTERNAL MEDICINE

## 2022-01-01 PROCEDURE — 99222 PR INITIAL HOSPITAL CARE,LEVL II: ICD-10-PCS | Mod: AI,NTX,, | Performed by: INTERNAL MEDICINE

## 2022-01-01 PROCEDURE — 93296 REM INTERROG EVL PM/IDS: CPT | Mod: NTX | Performed by: INTERNAL MEDICINE

## 2022-01-01 PROCEDURE — U0002 COVID-19 LAB TEST NON-CDC: HCPCS | Mod: NTX | Performed by: INTERNAL MEDICINE

## 2022-01-01 PROCEDURE — 4010F PR ACE/ARB THEARPY RXD/TAKEN: ICD-10-PCS | Mod: CPTII,S$GLB,, | Performed by: INTERNAL MEDICINE

## 2022-01-01 PROCEDURE — 93010 EKG 12-LEAD: ICD-10-PCS | Mod: NTX,,, | Performed by: STUDENT IN AN ORGANIZED HEALTH CARE EDUCATION/TRAINING PROGRAM

## 2022-01-01 PROCEDURE — 99999 PR PBB SHADOW E&M-EST. PATIENT-LVL IV: ICD-10-PCS | Mod: PBBFAC,TXP,, | Performed by: THORACIC SURGERY (CARDIOTHORACIC VASCULAR SURGERY)

## 2022-01-01 PROCEDURE — 84484 ASSAY OF TROPONIN QUANT: CPT | Mod: NTX | Performed by: PHYSICIAN ASSISTANT

## 2022-01-01 PROCEDURE — 99285 PR EMERGENCY DEPT VISIT,LEVEL V: ICD-10-PCS | Mod: GC,NTX,CS, | Performed by: EMERGENCY MEDICINE

## 2022-01-01 PROCEDURE — 87186 SC STD MICRODIL/AGAR DIL: CPT | Mod: NTX | Performed by: STUDENT IN AN ORGANIZED HEALTH CARE EDUCATION/TRAINING PROGRAM

## 2022-01-01 PROCEDURE — 36620 INSERTION CATHETER ARTERY: CPT | Mod: 59,NTX,, | Performed by: ANESTHESIOLOGY

## 2022-01-01 PROCEDURE — 85025 COMPLETE CBC W/AUTO DIFF WBC: CPT | Mod: NTX | Performed by: PHYSICIAN ASSISTANT

## 2022-01-01 PROCEDURE — 99285 PR EMERGENCY DEPT VISIT,LEVEL V: ICD-10-PCS | Mod: ,,, | Performed by: EMERGENCY MEDICINE

## 2022-01-01 PROCEDURE — 37000009 HC ANESTHESIA EA ADD 15 MINS: Mod: NTX | Performed by: STUDENT IN AN ORGANIZED HEALTH CARE EDUCATION/TRAINING PROGRAM

## 2022-01-01 PROCEDURE — 99214 PR OFFICE/OUTPT VISIT, EST, LEVL IV, 30-39 MIN: ICD-10-PCS | Mod: GC,NTX,, | Performed by: INTERNAL MEDICINE

## 2022-01-01 PROCEDURE — 94729 DIFFUSING CAPACITY: CPT | Mod: NTX,S$GLB,, | Performed by: INTERNAL MEDICINE

## 2022-01-01 PROCEDURE — 99214 OFFICE O/P EST MOD 30 MIN: CPT | Mod: 95,,, | Performed by: INTERNAL MEDICINE

## 2022-01-01 PROCEDURE — 27201012 HC FORCEPS, HOT/COLD, DISP: Mod: TXP | Performed by: STUDENT IN AN ORGANIZED HEALTH CARE EDUCATION/TRAINING PROGRAM

## 2022-01-01 PROCEDURE — 99223 1ST HOSP IP/OBS HIGH 75: CPT | Mod: NTX,,, | Performed by: INTERNAL MEDICINE

## 2022-01-01 PROCEDURE — 97162 PT EVAL MOD COMPLEX 30 MIN: CPT

## 2022-01-01 PROCEDURE — 63600175 PHARM REV CODE 636 W HCPCS

## 2022-01-01 PROCEDURE — 3078F PR MOST RECENT DIASTOLIC BLOOD PRESSURE < 80 MM HG: ICD-10-PCS | Mod: CPTII,S$GLB,TXP, | Performed by: THORACIC SURGERY (CARDIOTHORACIC VASCULAR SURGERY)

## 2022-01-01 PROCEDURE — 82378 CARCINOEMBRYONIC ANTIGEN: CPT | Mod: NTX | Performed by: INTERNAL MEDICINE

## 2022-01-01 PROCEDURE — 87040 BLOOD CULTURE FOR BACTERIA: CPT | Mod: NTX | Performed by: STUDENT IN AN ORGANIZED HEALTH CARE EDUCATION/TRAINING PROGRAM

## 2022-01-01 PROCEDURE — 83690 ASSAY OF LIPASE: CPT | Performed by: STUDENT IN AN ORGANIZED HEALTH CARE EDUCATION/TRAINING PROGRAM

## 2022-01-01 PROCEDURE — 80053 COMPREHEN METABOLIC PANEL: CPT | Mod: NTX | Performed by: EMERGENCY MEDICINE

## 2022-01-01 PROCEDURE — 99499 RISK ADDL DX/OHS AUDIT: ICD-10-PCS | Mod: S$GLB,,, | Performed by: INTERNAL MEDICINE

## 2022-01-01 PROCEDURE — 3074F SYST BP LT 130 MM HG: CPT | Mod: CPTII,NTX,S$GLB, | Performed by: INTERNAL MEDICINE

## 2022-01-01 PROCEDURE — 96365 THER/PROPH/DIAG IV INF INIT: CPT

## 2022-01-01 PROCEDURE — 99223 1ST HOSP IP/OBS HIGH 75: CPT | Mod: ,,, | Performed by: NURSE PRACTITIONER

## 2022-01-01 PROCEDURE — 82330 ASSAY OF CALCIUM: CPT | Mod: NTX

## 2022-01-01 PROCEDURE — 3078F PR MOST RECENT DIASTOLIC BLOOD PRESSURE < 80 MM HG: ICD-10-PCS | Mod: CPTII,S$GLB,, | Performed by: NURSE PRACTITIONER

## 2022-01-01 PROCEDURE — 3008F BODY MASS INDEX DOCD: CPT | Mod: CPTII,S$GLB,, | Performed by: INTERNAL MEDICINE

## 2022-01-01 PROCEDURE — 99499 RISK ADDL DX/OHS AUDIT: ICD-10-PCS | Mod: 95,,, | Performed by: INTERNAL MEDICINE

## 2022-01-01 PROCEDURE — 43242 EGD US FINE NEEDLE BX/ASPIR: CPT | Mod: NTX | Performed by: INTERNAL MEDICINE

## 2022-01-01 PROCEDURE — 25000003 PHARM REV CODE 250: Mod: TXP | Performed by: STUDENT IN AN ORGANIZED HEALTH CARE EDUCATION/TRAINING PROGRAM

## 2022-01-01 PROCEDURE — 87389 HIV-1 AG W/HIV-1&-2 AB AG IA: CPT | Mod: TXP | Performed by: INTERNAL MEDICINE

## 2022-01-01 PROCEDURE — 82803 BLOOD GASES ANY COMBINATION: CPT

## 2022-01-01 PROCEDURE — 94621 CARDIOPULM EXERCISE TESTING: CPT | Mod: 26,NTX,, | Performed by: INTERNAL MEDICINE

## 2022-01-01 PROCEDURE — 99285 PR EMERGENCY DEPT VISIT,LEVEL V: ICD-10-PCS | Mod: NTX,,, | Performed by: PHYSICIAN ASSISTANT

## 2022-01-01 PROCEDURE — 3288F FALL RISK ASSESSMENT DOCD: CPT | Mod: CPTII,NTX,S$GLB, | Performed by: INTERNAL MEDICINE

## 2022-01-01 PROCEDURE — 99999 PR PBB SHADOW E&M-EST. PATIENT-LVL IV: ICD-10-PCS | Mod: PBBFAC,,, | Performed by: NURSE PRACTITIONER

## 2022-01-01 PROCEDURE — 3074F SYST BP LT 130 MM HG: CPT | Mod: CPTII,S$GLB,, | Performed by: INTERNAL MEDICINE

## 2022-01-01 PROCEDURE — 99900017 HC EXTUBATION W/PARAMETERS (STAT): Mod: NTX

## 2022-01-01 PROCEDURE — 99232 PR SUBSEQUENT HOSPITAL CARE,LEVL II: ICD-10-PCS | Mod: GC,,, | Performed by: INTERNAL MEDICINE

## 2022-01-01 PROCEDURE — 1159F MED LIST DOCD IN RCRD: CPT | Mod: CPTII,S$GLB,, | Performed by: INTERNAL MEDICINE

## 2022-01-01 PROCEDURE — 87077 CULTURE AEROBIC IDENTIFY: CPT | Performed by: STUDENT IN AN ORGANIZED HEALTH CARE EDUCATION/TRAINING PROGRAM

## 2022-01-01 PROCEDURE — 3288F FALL RISK ASSESSMENT DOCD: CPT | Mod: CPTII,S$GLB,, | Performed by: NURSE PRACTITIONER

## 2022-01-01 PROCEDURE — 37799 UNLISTED PX VASCULAR SURGERY: CPT | Mod: NTX

## 2022-01-01 PROCEDURE — 3074F SYST BP LT 130 MM HG: CPT | Mod: CPTII,S$GLB,TXP, | Performed by: THORACIC SURGERY (CARDIOTHORACIC VASCULAR SURGERY)

## 2022-01-01 PROCEDURE — 87633 RESP VIRUS 12-25 TARGETS: CPT | Mod: NTX | Performed by: STUDENT IN AN ORGANIZED HEALTH CARE EDUCATION/TRAINING PROGRAM

## 2022-01-01 PROCEDURE — 99214 PR OFFICE/OUTPT VISIT, EST, LEVL IV, 30-39 MIN: ICD-10-PCS | Mod: S$GLB,,, | Performed by: NURSE PRACTITIONER

## 2022-01-01 PROCEDURE — 94618 PULMONARY STRESS TESTING: CPT | Mod: NTX,S$GLB,, | Performed by: INTERNAL MEDICINE

## 2022-01-01 PROCEDURE — 1126F AMNT PAIN NOTED NONE PRSNT: CPT | Mod: CPTII,S$GLB,, | Performed by: INTERNAL MEDICINE

## 2022-01-01 PROCEDURE — C1751 CATH, INF, PER/CENT/MIDLINE: HCPCS | Mod: NTX | Performed by: INTERNAL MEDICINE

## 2022-01-01 PROCEDURE — U0002 COVID-19 LAB TEST NON-CDC: HCPCS | Mod: NTX | Performed by: EMERGENCY MEDICINE

## 2022-01-01 PROCEDURE — 87186 SC STD MICRODIL/AGAR DIL: CPT | Performed by: STUDENT IN AN ORGANIZED HEALTH CARE EDUCATION/TRAINING PROGRAM

## 2022-01-01 PROCEDURE — 37000008 HC ANESTHESIA 1ST 15 MINUTES: Mod: NTX

## 2022-01-01 PROCEDURE — 1101F PT FALLS ASSESS-DOCD LE1/YR: CPT | Mod: CPTII,S$GLB,, | Performed by: NURSE PRACTITIONER

## 2022-01-01 PROCEDURE — 99214 PR OFFICE/OUTPT VISIT, EST, LEVL IV, 30-39 MIN: ICD-10-PCS | Mod: NTX,S$GLB,, | Performed by: INTERNAL MEDICINE

## 2022-01-01 PROCEDURE — 99499 RISK ADDL DX/OHS AUDIT: ICD-10-PCS | Mod: S$GLB,,, | Performed by: STUDENT IN AN ORGANIZED HEALTH CARE EDUCATION/TRAINING PROGRAM

## 2022-01-01 PROCEDURE — 3078F DIAST BP <80 MM HG: CPT | Mod: CPTII,NTX,S$GLB, | Performed by: STUDENT IN AN ORGANIZED HEALTH CARE EDUCATION/TRAINING PROGRAM

## 2022-01-01 PROCEDURE — 99285 EMERGENCY DEPT VISIT HI MDM: CPT | Mod: ,,, | Performed by: EMERGENCY MEDICINE

## 2022-01-01 PROCEDURE — 27201037 HC PRESSURE MONITORING SET UP: Mod: NTX

## 2022-01-01 PROCEDURE — 99284 PR EMERGENCY DEPT VISIT,LEVEL IV: ICD-10-PCS | Mod: ,,, | Performed by: EMERGENCY MEDICINE

## 2022-01-01 PROCEDURE — 88305 TISSUE EXAM BY PATHOLOGIST: CPT | Mod: 26,NTX,, | Performed by: PATHOLOGY

## 2022-01-01 PROCEDURE — 33244 PR RMV PACER/ELECTRD,TRANSVEN EXTRCT: ICD-10-PCS | Mod: 22,GC,NTX, | Performed by: STUDENT IN AN ORGANIZED HEALTH CARE EDUCATION/TRAINING PROGRAM

## 2022-01-01 PROCEDURE — 93295 DEV INTERROG REMOTE 1/2/MLT: CPT | Mod: NTX,,, | Performed by: INTERNAL MEDICINE

## 2022-01-01 PROCEDURE — 63600175 PHARM REV CODE 636 W HCPCS: Mod: TXP | Performed by: NURSE ANESTHETIST, CERTIFIED REGISTERED

## 2022-01-01 PROCEDURE — 99284 EMERGENCY DEPT VISIT MOD MDM: CPT | Mod: 25

## 2022-01-01 PROCEDURE — 84484 ASSAY OF TROPONIN QUANT: CPT | Mod: NTX | Performed by: STUDENT IN AN ORGANIZED HEALTH CARE EDUCATION/TRAINING PROGRAM

## 2022-01-01 PROCEDURE — 4010F PR ACE/ARB THEARPY RXD/TAKEN: ICD-10-PCS | Mod: CPTII,NTX,S$GLB, | Performed by: STUDENT IN AN ORGANIZED HEALTH CARE EDUCATION/TRAINING PROGRAM

## 2022-01-01 PROCEDURE — 33244 REMOVE ELCTRD TRANSVENOUSLY: CPT | Mod: GC,NTX | Performed by: STUDENT IN AN ORGANIZED HEALTH CARE EDUCATION/TRAINING PROGRAM

## 2022-01-01 PROCEDURE — 80076 HEPATIC FUNCTION PANEL: CPT | Mod: NTX | Performed by: INTERNAL MEDICINE

## 2022-01-01 PROCEDURE — 86480 TB TEST CELL IMMUN MEASURE: CPT

## 2022-01-01 PROCEDURE — 99232 SBSQ HOSP IP/OBS MODERATE 35: CPT | Mod: GC,,, | Performed by: HOSPITALIST

## 2022-01-01 PROCEDURE — 25500020 PHARM REV CODE 255: Mod: NTX | Performed by: EMERGENCY MEDICINE

## 2022-01-01 PROCEDURE — U0005 INFEC AGEN DETEC AMPLI PROBE: HCPCS | Mod: NTX | Performed by: INTERNAL MEDICINE

## 2022-01-01 PROCEDURE — 1101F PR PT FALLS ASSESS DOC 0-1 FALLS W/OUT INJ PAST YR: ICD-10-PCS | Mod: CPTII,S$GLB,, | Performed by: INTERNAL MEDICINE

## 2022-01-01 PROCEDURE — 1125F PR PAIN SEVERITY QUANTIFIED, PAIN PRESENT: ICD-10-PCS | Mod: CPTII,NTX,S$GLB, | Performed by: STUDENT IN AN ORGANIZED HEALTH CARE EDUCATION/TRAINING PROGRAM

## 2022-01-01 PROCEDURE — U0005 INFEC AGEN DETEC AMPLI PROBE: HCPCS | Performed by: STUDENT IN AN ORGANIZED HEALTH CARE EDUCATION/TRAINING PROGRAM

## 2022-01-01 PROCEDURE — 96374 THER/PROPH/DIAG INJ IV PUSH: CPT

## 2022-01-01 PROCEDURE — 82550 ASSAY OF CK (CPK): CPT | Mod: NTX | Performed by: STUDENT IN AN ORGANIZED HEALTH CARE EDUCATION/TRAINING PROGRAM

## 2022-01-01 PROCEDURE — 99999 PR PBB SHADOW E&M-EST. PATIENT-LVL III: CPT | Mod: PBBFAC,TXP,, | Performed by: STUDENT IN AN ORGANIZED HEALTH CARE EDUCATION/TRAINING PROGRAM

## 2022-01-01 PROCEDURE — 83880 ASSAY OF NATRIURETIC PEPTIDE: CPT | Mod: NTX | Performed by: EMERGENCY MEDICINE

## 2022-01-01 PROCEDURE — G0378 HOSPITAL OBSERVATION PER HR: HCPCS | Mod: NTX

## 2022-01-01 PROCEDURE — 86920 COMPATIBILITY TEST SPIN: CPT | Mod: NTX | Performed by: INTERNAL MEDICINE

## 2022-01-01 PROCEDURE — 88305 TISSUE EXAM BY PATHOLOGIST: ICD-10-PCS | Mod: 26,NTX,, | Performed by: PATHOLOGY

## 2022-01-01 PROCEDURE — 83735 ASSAY OF MAGNESIUM: CPT | Mod: 91,NTX | Performed by: INTERNAL MEDICINE

## 2022-01-01 PROCEDURE — 36415 COLL VENOUS BLD VENIPUNCTURE: CPT | Performed by: STUDENT IN AN ORGANIZED HEALTH CARE EDUCATION/TRAINING PROGRAM

## 2022-01-01 PROCEDURE — 93451 RIGHT HEART CATH: CPT | Mod: 26,NTX,, | Performed by: INTERNAL MEDICINE

## 2022-01-01 PROCEDURE — 99232 PR SUBSEQUENT HOSPITAL CARE,LEVL II: ICD-10-PCS | Mod: NTX,,,

## 2022-01-01 PROCEDURE — 99232 PR SUBSEQUENT HOSPITAL CARE,LEVL II: ICD-10-PCS | Mod: GC,,, | Performed by: HOSPITALIST

## 2022-01-01 PROCEDURE — 82150 ASSAY OF AMYLASE: CPT | Mod: NTX | Performed by: STUDENT IN AN ORGANIZED HEALTH CARE EDUCATION/TRAINING PROGRAM

## 2022-01-01 PROCEDURE — 80076 HEPATIC FUNCTION PANEL: CPT | Mod: NTX | Performed by: STUDENT IN AN ORGANIZED HEALTH CARE EDUCATION/TRAINING PROGRAM

## 2022-01-01 PROCEDURE — 84540 ASSAY OF URINE/UREA-N: CPT

## 2022-01-01 PROCEDURE — 86803 HEPATITIS C AB TEST: CPT | Performed by: EMERGENCY MEDICINE

## 2022-01-01 PROCEDURE — 25000003 PHARM REV CODE 250: Mod: NTX

## 2022-01-01 PROCEDURE — 83690 ASSAY OF LIPASE: CPT | Mod: 91,NTX | Performed by: STUDENT IN AN ORGANIZED HEALTH CARE EDUCATION/TRAINING PROGRAM

## 2022-01-01 PROCEDURE — 84145 PROCALCITONIN (PCT): CPT | Performed by: STUDENT IN AN ORGANIZED HEALTH CARE EDUCATION/TRAINING PROGRAM

## 2022-01-01 PROCEDURE — 4010F PR ACE/ARB THEARPY RXD/TAKEN: ICD-10-PCS | Mod: CPTII,S$GLB,, | Performed by: NURSE PRACTITIONER

## 2022-01-01 PROCEDURE — 99238 PR HOSPITAL DISCHARGE DAY,<30 MIN: ICD-10-PCS | Mod: NTX,,, | Performed by: INTERNAL MEDICINE

## 2022-01-01 PROCEDURE — 87040 BLOOD CULTURE FOR BACTERIA: CPT | Mod: NTX | Performed by: INTERNAL MEDICINE

## 2022-01-01 PROCEDURE — 83690 ASSAY OF LIPASE: CPT | Mod: NTX | Performed by: EMERGENCY MEDICINE

## 2022-01-01 PROCEDURE — 99215 PR OFFICE/OUTPT VISIT, EST, LEVL V, 40-54 MIN: ICD-10-PCS | Mod: NTX,S$GLB,, | Performed by: STUDENT IN AN ORGANIZED HEALTH CARE EDUCATION/TRAINING PROGRAM

## 2022-01-01 PROCEDURE — 4010F ACE/ARB THERAPY RXD/TAKEN: CPT | Mod: CPTII,NTX,S$GLB, | Performed by: STUDENT IN AN ORGANIZED HEALTH CARE EDUCATION/TRAINING PROGRAM

## 2022-01-01 PROCEDURE — 93295 CARDIAC DEVICE CHECK - REMOTE: ICD-10-PCS | Mod: ,,, | Performed by: INTERNAL MEDICINE

## 2022-01-01 PROCEDURE — 36620 INSERTION CATHETER ARTERY: CPT | Mod: NTX,,, | Performed by: INTERNAL MEDICINE

## 2022-01-01 PROCEDURE — 82570 ASSAY OF URINE CREATININE: CPT

## 2022-01-01 PROCEDURE — 4010F ACE/ARB THERAPY RXD/TAKEN: CPT | Mod: CPTII,S$GLB,, | Performed by: NURSE PRACTITIONER

## 2022-01-01 PROCEDURE — 99233 PR SUBSEQUENT HOSPITAL CARE,LEVL III: ICD-10-PCS | Mod: GC,,, | Performed by: HOSPITALIST

## 2022-01-01 PROCEDURE — 88112 PR  CYTOPATH, CELL ENHANCE TECH: ICD-10-PCS | Mod: 26,NTX,, | Performed by: PATHOLOGY

## 2022-01-01 PROCEDURE — 99238 HOSP IP/OBS DSCHRG MGMT 30/<: CPT | Mod: NTX,,, | Performed by: INTERNAL MEDICINE

## 2022-01-01 PROCEDURE — 3078F DIAST BP <80 MM HG: CPT | Mod: CPTII,S$GLB,, | Performed by: INTERNAL MEDICINE

## 2022-01-01 PROCEDURE — 3074F PR MOST RECENT SYSTOLIC BLOOD PRESSURE < 130 MM HG: ICD-10-PCS | Mod: CPTII,NTX,S$GLB, | Performed by: STUDENT IN AN ORGANIZED HEALTH CARE EDUCATION/TRAINING PROGRAM

## 2022-01-01 PROCEDURE — 84484 ASSAY OF TROPONIN QUANT: CPT | Mod: 91 | Performed by: INTERNAL MEDICINE

## 2022-01-01 PROCEDURE — 93880 EXTRACRANIAL BILAT STUDY: CPT | Mod: NTX

## 2022-01-01 PROCEDURE — 99285 PR EMERGENCY DEPT VISIT,LEVEL V: ICD-10-PCS | Mod: NTX,CS,, | Performed by: EMERGENCY MEDICINE

## 2022-01-01 PROCEDURE — 96372 THER/PROPH/DIAG INJ SC/IM: CPT | Performed by: STUDENT IN AN ORGANIZED HEALTH CARE EDUCATION/TRAINING PROGRAM

## 2022-01-01 PROCEDURE — 3074F PR MOST RECENT SYSTOLIC BLOOD PRESSURE < 130 MM HG: ICD-10-PCS | Mod: CPTII,S$GLB,, | Performed by: INTERNAL MEDICINE

## 2022-01-01 PROCEDURE — 81001 URINALYSIS AUTO W/SCOPE: CPT | Performed by: STUDENT IN AN ORGANIZED HEALTH CARE EDUCATION/TRAINING PROGRAM

## 2022-01-01 PROCEDURE — 97161 PT EVAL LOW COMPLEX 20 MIN: CPT | Mod: NTX

## 2022-01-01 PROCEDURE — 63600175 PHARM REV CODE 636 W HCPCS: Mod: JG,NTX | Performed by: PHYSICIAN ASSISTANT

## 2022-01-01 PROCEDURE — 99284 PR EMERGENCY DEPT VISIT,LEVEL IV: ICD-10-PCS | Mod: NTX,,, | Performed by: EMERGENCY MEDICINE

## 2022-01-01 PROCEDURE — 27202059 HC NEEDLE, FNA (ANY): Mod: NTX | Performed by: INTERNAL MEDICINE

## 2022-01-01 PROCEDURE — 1159F MED LIST DOCD IN RCRD: CPT | Mod: CPTII,S$GLB,TXP, | Performed by: THORACIC SURGERY (CARDIOTHORACIC VASCULAR SURGERY)

## 2022-01-01 PROCEDURE — 99285 PR EMERGENCY DEPT VISIT,LEVEL V: ICD-10-PCS | Mod: GC,CS,, | Performed by: EMERGENCY MEDICINE

## 2022-01-01 PROCEDURE — 94003 VENT MGMT INPAT SUBQ DAY: CPT | Mod: NTX

## 2022-01-01 PROCEDURE — 37000009 HC ANESTHESIA EA ADD 15 MINS: Mod: NTX | Performed by: INTERNAL MEDICINE

## 2022-01-01 PROCEDURE — C2628 CATHETER, OCCLUSION: HCPCS | Mod: NTX | Performed by: STUDENT IN AN ORGANIZED HEALTH CARE EDUCATION/TRAINING PROGRAM

## 2022-01-01 PROCEDURE — 99900031 HC PATIENT EDUCATION (STAT)

## 2022-01-01 PROCEDURE — 99284 EMERGENCY DEPT VISIT MOD MDM: CPT | Mod: 25,NTX

## 2022-01-01 PROCEDURE — 87449 NOS EACH ORGANISM AG IA: CPT | Performed by: HOSPITALIST

## 2022-01-01 PROCEDURE — 87077 CULTURE AEROBIC IDENTIFY: CPT | Mod: NTX | Performed by: STUDENT IN AN ORGANIZED HEALTH CARE EDUCATION/TRAINING PROGRAM

## 2022-01-01 PROCEDURE — 80053 COMPREHEN METABOLIC PANEL: CPT | Mod: 91,NTX | Performed by: STUDENT IN AN ORGANIZED HEALTH CARE EDUCATION/TRAINING PROGRAM

## 2022-01-01 PROCEDURE — 99999 PR PBB SHADOW E&M-EST. PATIENT-LVL IV: CPT | Mod: PBBFAC,,, | Performed by: NURSE PRACTITIONER

## 2022-01-01 PROCEDURE — 84132 ASSAY OF SERUM POTASSIUM: CPT | Mod: NTX

## 2022-01-01 PROCEDURE — 93922 UPR/L XTREMITY ART 2 LEVELS: CPT | Mod: TXP

## 2022-01-01 PROCEDURE — G0180 MD CERTIFICATION HHA PATIENT: HCPCS | Mod: ,,, | Performed by: INTERNAL MEDICINE

## 2022-01-01 PROCEDURE — 99284 PR EMERGENCY DEPT VISIT,LEVEL IV: ICD-10-PCS | Mod: NTX,,, | Performed by: STUDENT IN AN ORGANIZED HEALTH CARE EDUCATION/TRAINING PROGRAM

## 2022-01-01 PROCEDURE — 84443 ASSAY THYROID STIM HORMONE: CPT | Mod: TXP | Performed by: INTERNAL MEDICINE

## 2022-01-01 PROCEDURE — 3288F FALL RISK ASSESSMENT DOCD: CPT | Mod: CPTII,NTX,S$GLB, | Performed by: STUDENT IN AN ORGANIZED HEALTH CARE EDUCATION/TRAINING PROGRAM

## 2022-01-01 PROCEDURE — 85610 PROTHROMBIN TIME: CPT | Mod: NTX | Performed by: INTERNAL MEDICINE

## 2022-01-01 PROCEDURE — 99499 UNLISTED E&M SERVICE: CPT | Mod: S$GLB,,,

## 2022-01-01 PROCEDURE — 70450 CT HEAD/BRAIN W/O DYE: CPT | Mod: 26,NTX,, | Performed by: RADIOLOGY

## 2022-01-01 PROCEDURE — 99239 PR HOSPITAL DISCHARGE DAY,>30 MIN: ICD-10-PCS | Mod: ,,, | Performed by: INTERNAL MEDICINE

## 2022-01-01 PROCEDURE — 3044F PR MOST RECENT HEMOGLOBIN A1C LEVEL <7.0%: ICD-10-PCS | Mod: CPTII,S$GLB,, | Performed by: INTERNAL MEDICINE

## 2022-01-01 PROCEDURE — 94727 PR PULM FUNCTION TEST BY GAS: ICD-10-PCS | Mod: NTX,S$GLB,, | Performed by: INTERNAL MEDICINE

## 2022-01-01 PROCEDURE — 97164 PT RE-EVAL EST PLAN CARE: CPT | Mod: NTX

## 2022-01-01 PROCEDURE — 99285 PR EMERGENCY DEPT VISIT,LEVEL V: ICD-10-PCS | Mod: NTX,,, | Performed by: EMERGENCY MEDICINE

## 2022-01-01 PROCEDURE — 1101F PR PT FALLS ASSESS DOC 0-1 FALLS W/OUT INJ PAST YR: ICD-10-PCS | Mod: CPTII,NTX,S$GLB, | Performed by: STUDENT IN AN ORGANIZED HEALTH CARE EDUCATION/TRAINING PROGRAM

## 2022-01-01 PROCEDURE — 94618 PULMONARY STRESS TESTING: ICD-10-PCS | Mod: NTX,S$GLB,, | Performed by: INTERNAL MEDICINE

## 2022-01-01 PROCEDURE — 3288F PR FALLS RISK ASSESSMENT DOCUMENTED: ICD-10-PCS | Mod: CPTII,S$GLB,, | Performed by: INTERNAL MEDICINE

## 2022-01-01 PROCEDURE — 80048 BASIC METABOLIC PNL TOTAL CA: CPT | Mod: 91,NTX | Performed by: INTERNAL MEDICINE

## 2022-01-01 PROCEDURE — 27000190 HC CPAP FULL FACE MASK W/VALVE

## 2022-01-01 PROCEDURE — 99499 RISK ADDL DX/OHS AUDIT: ICD-10-PCS | Mod: S$GLB,,, | Performed by: NURSE PRACTITIONER

## 2022-01-01 PROCEDURE — 27000239 HC STAND-BY BYPASS PUMP: Mod: NTX

## 2022-01-01 PROCEDURE — 4010F ACE/ARB THERAPY RXD/TAKEN: CPT | Mod: CPTII,S$GLB,TXP, | Performed by: THORACIC SURGERY (CARDIOTHORACIC VASCULAR SURGERY)

## 2022-01-01 PROCEDURE — 37000008 HC ANESTHESIA 1ST 15 MINUTES: Mod: NTX | Performed by: STUDENT IN AN ORGANIZED HEALTH CARE EDUCATION/TRAINING PROGRAM

## 2022-01-01 PROCEDURE — 71046 X-RAY EXAM CHEST 2 VIEWS: CPT | Mod: TC,NTX

## 2022-01-01 PROCEDURE — 43242 PR UPGI ENDOSCOPY,FN NEEDLE BX,GUIDED: ICD-10-PCS | Mod: NTX,,, | Performed by: INTERNAL MEDICINE

## 2022-01-01 PROCEDURE — 88112 CYTOPATH CELL ENHANCE TECH: CPT | Mod: NTX | Performed by: PATHOLOGY

## 2022-01-01 PROCEDURE — 87040 BLOOD CULTURE FOR BACTERIA: CPT | Mod: 59,NTX | Performed by: EMERGENCY MEDICINE

## 2022-01-01 PROCEDURE — 99284 EMERGENCY DEPT VISIT MOD MDM: CPT | Mod: NTX,,, | Performed by: EMERGENCY MEDICINE

## 2022-01-01 PROCEDURE — 99497 PR ADVNCD CARE PLAN 30 MIN: ICD-10-PCS | Mod: NTX,,, | Performed by: CLINICAL NURSE SPECIALIST

## 2022-01-01 PROCEDURE — U0003 INFECTIOUS AGENT DETECTION BY NUCLEIC ACID (DNA OR RNA); SEVERE ACUTE RESPIRATORY SYNDROME CORONAVIRUS 2 (SARS-COV-2) (CORONAVIRUS DISEASE [COVID-19]), AMPLIFIED PROBE TECHNIQUE, MAKING USE OF HIGH THROUGHPUT TECHNOLOGIES AS DESCRIBED BY CMS-2020-01-R: HCPCS | Mod: NTX | Performed by: INTERNAL MEDICINE

## 2022-01-01 PROCEDURE — 80323 ALKALOIDS NOS: CPT | Mod: TXP | Performed by: INTERNAL MEDICINE

## 2022-01-01 PROCEDURE — 99215 OFFICE O/P EST HI 40 MIN: CPT | Mod: NTX,S$GLB,, | Performed by: STUDENT IN AN ORGANIZED HEALTH CARE EDUCATION/TRAINING PROGRAM

## 2022-01-01 PROCEDURE — 31500 INSERT EMERGENCY AIRWAY: CPT | Mod: NTX | Performed by: STUDENT IN AN ORGANIZED HEALTH CARE EDUCATION/TRAINING PROGRAM

## 2022-01-01 PROCEDURE — 99214 PR OFFICE/OUTPT VISIT, EST, LEVL IV, 30-39 MIN: ICD-10-PCS | Mod: S$GLB,,, | Performed by: INTERNAL MEDICINE

## 2022-01-01 PROCEDURE — 83036 HEMOGLOBIN GLYCOSYLATED A1C: CPT | Mod: TXP | Performed by: INTERNAL MEDICINE

## 2022-01-01 PROCEDURE — 37000008 HC ANESTHESIA 1ST 15 MINUTES: Mod: TXP | Performed by: STUDENT IN AN ORGANIZED HEALTH CARE EDUCATION/TRAINING PROGRAM

## 2022-01-01 PROCEDURE — U0003 INFECTIOUS AGENT DETECTION BY NUCLEIC ACID (DNA OR RNA); SEVERE ACUTE RESPIRATORY SYNDROME CORONAVIRUS 2 (SARS-COV-2) (CORONAVIRUS DISEASE [COVID-19]), AMPLIFIED PROBE TECHNIQUE, MAKING USE OF HIGH THROUGHPUT TECHNOLOGIES AS DESCRIBED BY CMS-2020-01-R: HCPCS | Performed by: STUDENT IN AN ORGANIZED HEALTH CARE EDUCATION/TRAINING PROGRAM

## 2022-01-01 PROCEDURE — D9220A PRA ANESTHESIA: Mod: PT,CRNA,NTX, | Performed by: NURSE ANESTHETIST, CERTIFIED REGISTERED

## 2022-01-01 PROCEDURE — 93880 EXTRACRANIAL BILAT STUDY: CPT | Mod: 26,NTX,, | Performed by: INTERNAL MEDICINE

## 2022-01-01 PROCEDURE — 99223 PR INITIAL HOSPITAL CARE,LEVL III: ICD-10-PCS | Mod: GC,,, | Performed by: INTERNAL MEDICINE

## 2022-01-01 PROCEDURE — D9220A PRA ANESTHESIA: ICD-10-PCS | Mod: ANES,NTX,, | Performed by: STUDENT IN AN ORGANIZED HEALTH CARE EDUCATION/TRAINING PROGRAM

## 2022-01-01 PROCEDURE — 99499 UNLISTED E&M SERVICE: CPT | Mod: 95,,, | Performed by: INTERNAL MEDICINE

## 2022-01-01 PROCEDURE — 3078F DIAST BP <80 MM HG: CPT | Mod: CPTII,S$GLB,TXP, | Performed by: THORACIC SURGERY (CARDIOTHORACIC VASCULAR SURGERY)

## 2022-01-01 PROCEDURE — 81241 F5 GENE: CPT | Mod: NTX | Performed by: INTERNAL MEDICINE

## 2022-01-01 PROCEDURE — 1126F PR PAIN SEVERITY QUANTIFIED, NO PAIN PRESENT: ICD-10-PCS | Mod: CPTII,S$GLB,TXP, | Performed by: THORACIC SURGERY (CARDIOTHORACIC VASCULAR SURGERY)

## 2022-01-01 PROCEDURE — 86901 BLOOD TYPING SEROLOGIC RH(D): CPT | Mod: NTX | Performed by: INTERNAL MEDICINE

## 2022-01-01 PROCEDURE — 99223 PR INITIAL HOSPITAL CARE,LEVL III: ICD-10-PCS | Mod: NTX,,, | Performed by: INTERNAL MEDICINE

## 2022-01-01 PROCEDURE — 80053 COMPREHEN METABOLIC PANEL: CPT | Mod: NTX | Performed by: PHYSICIAN ASSISTANT

## 2022-01-01 PROCEDURE — 99285 EMERGENCY DEPT VISIT HI MDM: CPT | Mod: NTX,,, | Performed by: PHYSICIAN ASSISTANT

## 2022-01-01 PROCEDURE — 83921 ORGANIC ACID SINGLE QUANT: CPT | Mod: NTX | Performed by: STUDENT IN AN ORGANIZED HEALTH CARE EDUCATION/TRAINING PROGRAM

## 2022-01-01 PROCEDURE — 1158F PR ADVANCE CARE PLANNING DISCUSS DOCUMENTED IN MEDICAL RECORD: ICD-10-PCS | Mod: CPTII,NTX,S$GLB, | Performed by: STUDENT IN AN ORGANIZED HEALTH CARE EDUCATION/TRAINING PROGRAM

## 2022-01-01 PROCEDURE — 99205 PR OFFICE/OUTPT VISIT, NEW, LEVL V, 60-74 MIN: ICD-10-PCS | Mod: S$GLB,TXP,, | Performed by: THORACIC SURGERY (CARDIOTHORACIC VASCULAR SURGERY)

## 2022-01-01 PROCEDURE — A4216 STERILE WATER/SALINE, 10 ML: HCPCS | Mod: NTX | Performed by: STUDENT IN AN ORGANIZED HEALTH CARE EDUCATION/TRAINING PROGRAM

## 2022-01-01 PROCEDURE — 99223 1ST HOSP IP/OBS HIGH 75: CPT | Mod: NTX,,, | Performed by: STUDENT IN AN ORGANIZED HEALTH CARE EDUCATION/TRAINING PROGRAM

## 2022-01-01 PROCEDURE — 85302 CLOT INHIBIT PROT C ANTIGEN: CPT | Mod: TXP | Performed by: INTERNAL MEDICINE

## 2022-01-01 PROCEDURE — 99223 PR INITIAL HOSPITAL CARE,LEVL III: ICD-10-PCS | Mod: ,,, | Performed by: NURSE PRACTITIONER

## 2022-01-01 PROCEDURE — 4010F ACE/ARB THERAPY RXD/TAKEN: CPT | Mod: CPTII,S$GLB,, | Performed by: INTERNAL MEDICINE

## 2022-01-01 PROCEDURE — 99223 PR INITIAL HOSPITAL CARE,LEVL III: ICD-10-PCS | Mod: AI,,, | Performed by: STUDENT IN AN ORGANIZED HEALTH CARE EDUCATION/TRAINING PROGRAM

## 2022-01-01 PROCEDURE — 1111F DSCHRG MED/CURRENT MED MERGE: CPT | Mod: CPTII,NTX,S$GLB, | Performed by: INTERNAL MEDICINE

## 2022-01-01 PROCEDURE — 94621 CARDIOPULM EXERCISE TESTING: CPT | Mod: NTX

## 2022-01-01 PROCEDURE — 96360 HYDRATION IV INFUSION INIT: CPT | Mod: NTX

## 2022-01-01 PROCEDURE — 99233 SBSQ HOSP IP/OBS HIGH 50: CPT | Mod: ,,, | Performed by: INTERNAL MEDICINE

## 2022-01-01 PROCEDURE — 82955 ASSAY OF G6PD ENZYME: CPT | Mod: NTX | Performed by: INTERNAL MEDICINE

## 2022-01-01 PROCEDURE — 3008F PR BODY MASS INDEX (BMI) DOCUMENTED: ICD-10-PCS | Mod: CPTII,S$GLB,, | Performed by: NURSE PRACTITIONER

## 2022-01-01 PROCEDURE — 1160F RVW MEDS BY RX/DR IN RCRD: CPT | Mod: CPTII,S$GLB,TXP, | Performed by: THORACIC SURGERY (CARDIOTHORACIC VASCULAR SURGERY)

## 2022-01-01 PROCEDURE — C1894 INTRO/SHEATH, NON-LASER: HCPCS | Mod: NTX | Performed by: STUDENT IN AN ORGANIZED HEALTH CARE EDUCATION/TRAINING PROGRAM

## 2022-01-01 PROCEDURE — 87502 INFLUENZA DNA AMP PROBE: CPT | Mod: NTX | Performed by: INTERNAL MEDICINE

## 2022-01-01 PROCEDURE — 87899 AGENT NOS ASSAY W/OPTIC: CPT | Mod: NTX | Performed by: EMERGENCY MEDICINE

## 2022-01-01 PROCEDURE — 1101F PT FALLS ASSESS-DOCD LE1/YR: CPT | Mod: CPTII,NTX,S$GLB, | Performed by: STUDENT IN AN ORGANIZED HEALTH CARE EDUCATION/TRAINING PROGRAM

## 2022-01-01 PROCEDURE — 25000003 PHARM REV CODE 250: Performed by: EMERGENCY MEDICINE

## 2022-01-01 PROCEDURE — 99497 ADVNCD CARE PLAN 30 MIN: CPT | Mod: NTX,S$GLB,, | Performed by: STUDENT IN AN ORGANIZED HEALTH CARE EDUCATION/TRAINING PROGRAM

## 2022-01-01 PROCEDURE — 93451 RIGHT HEART CATH: CPT | Mod: NTX | Performed by: INTERNAL MEDICINE

## 2022-01-01 PROCEDURE — 80047 BASIC METABLC PNL IONIZED CA: CPT | Mod: 91

## 2022-01-01 PROCEDURE — 99284 PR EMERGENCY DEPT VISIT,LEVEL IV: ICD-10-PCS | Mod: GC,CS,, | Performed by: EMERGENCY MEDICINE

## 2022-01-01 PROCEDURE — 99285 EMERGENCY DEPT VISIT HI MDM: CPT | Mod: NTX,CS,, | Performed by: EMERGENCY MEDICINE

## 2022-01-01 PROCEDURE — 99232 SBSQ HOSP IP/OBS MODERATE 35: CPT | Mod: GC,,, | Performed by: INTERNAL MEDICINE

## 2022-01-01 PROCEDURE — 80307 DRUG TEST PRSMV CHEM ANLYZR: CPT | Mod: TXP | Performed by: INTERNAL MEDICINE

## 2022-01-01 PROCEDURE — 87075 CULTR BACTERIA EXCEPT BLOOD: CPT | Mod: 59,NTX | Performed by: STUDENT IN AN ORGANIZED HEALTH CARE EDUCATION/TRAINING PROGRAM

## 2022-01-01 PROCEDURE — 31500 INSERT EMERGENCY AIRWAY: CPT | Mod: NTX,,, | Performed by: ANESTHESIOLOGY

## 2022-01-01 PROCEDURE — 83615 LACTATE (LD) (LDH) ENZYME: CPT | Mod: NTX | Performed by: STUDENT IN AN ORGANIZED HEALTH CARE EDUCATION/TRAINING PROGRAM

## 2022-01-01 PROCEDURE — 99999 PR PBB SHADOW E&M-EST. PATIENT-LVL III: ICD-10-PCS | Mod: PBBFAC,TXP,, | Performed by: INTERNAL MEDICINE

## 2022-01-01 PROCEDURE — 3074F PR MOST RECENT SYSTOLIC BLOOD PRESSURE < 130 MM HG: ICD-10-PCS | Mod: CPTII,S$GLB,TXP, | Performed by: THORACIC SURGERY (CARDIOTHORACIC VASCULAR SURGERY)

## 2022-01-01 PROCEDURE — 1126F AMNT PAIN NOTED NONE PRSNT: CPT | Mod: CPTII,S$GLB,, | Performed by: NURSE PRACTITIONER

## 2022-01-01 PROCEDURE — 82728 ASSAY OF FERRITIN: CPT | Mod: NTX | Performed by: INTERNAL MEDICINE

## 2022-01-01 PROCEDURE — 36620 PR INSERT CATH,ART,PERCUT,SHORTTERM: ICD-10-PCS | Mod: 59,NTX,, | Performed by: ANESTHESIOLOGY

## 2022-01-01 PROCEDURE — 84145 PROCALCITONIN (PCT): CPT | Mod: NTX | Performed by: INTERNAL MEDICINE

## 2022-01-01 PROCEDURE — D9220A PRA ANESTHESIA: ICD-10-PCS | Mod: PT,CRNA,NTX, | Performed by: NURSE ANESTHETIST, CERTIFIED REGISTERED

## 2022-01-01 PROCEDURE — 85014 HEMATOCRIT: CPT | Mod: NTX

## 2022-01-01 PROCEDURE — 83605 ASSAY OF LACTIC ACID: CPT | Performed by: STUDENT IN AN ORGANIZED HEALTH CARE EDUCATION/TRAINING PROGRAM

## 2022-01-01 PROCEDURE — 99214 PR OFFICE/OUTPT VISIT, EST, LEVL IV, 30-39 MIN: ICD-10-PCS | Mod: 95,,, | Performed by: INTERNAL MEDICINE

## 2022-01-01 PROCEDURE — 93880 CV US DOPPLER CAROTID (CUPID ONLY): ICD-10-PCS | Mod: 26,NTX,, | Performed by: INTERNAL MEDICINE

## 2022-01-01 PROCEDURE — 99285 EMERGENCY DEPT VISIT HI MDM: CPT | Mod: NTX,,, | Performed by: EMERGENCY MEDICINE

## 2022-01-01 PROCEDURE — 1158F ADVNC CARE PLAN TLK DOCD: CPT | Mod: CPTII,NTX,S$GLB, | Performed by: STUDENT IN AN ORGANIZED HEALTH CARE EDUCATION/TRAINING PROGRAM

## 2022-01-01 PROCEDURE — 94729 PR C02/MEMBANE DIFFUSE CAPACITY: ICD-10-PCS | Mod: NTX,S$GLB,, | Performed by: INTERNAL MEDICINE

## 2022-01-01 PROCEDURE — 3078F PR MOST RECENT DIASTOLIC BLOOD PRESSURE < 80 MM HG: ICD-10-PCS | Mod: CPTII,S$GLB,, | Performed by: INTERNAL MEDICINE

## 2022-01-01 PROCEDURE — 86803 HEPATITIS C AB TEST: CPT | Mod: TXP | Performed by: INTERNAL MEDICINE

## 2022-01-01 PROCEDURE — 99499 UNLISTED E&M SERVICE: CPT | Mod: S$GLB,,, | Performed by: STUDENT IN AN ORGANIZED HEALTH CARE EDUCATION/TRAINING PROGRAM

## 2022-01-01 PROCEDURE — 84100 ASSAY OF PHOSPHORUS: CPT | Mod: 91,NTX | Performed by: STUDENT IN AN ORGANIZED HEALTH CARE EDUCATION/TRAINING PROGRAM

## 2022-01-01 PROCEDURE — C8929 TTE W OR WO FOL WCON,DOPPLER: HCPCS | Mod: TXP

## 2022-01-01 PROCEDURE — 1111F PR DISCHARGE MEDS RECONCILED W/ CURRENT OUTPATIENT MED LIST: ICD-10-PCS | Mod: CPTII,NTX,S$GLB, | Performed by: INTERNAL MEDICINE

## 2022-01-01 PROCEDURE — 99285 EMERGENCY DEPT VISIT HI MDM: CPT | Mod: GC,NTX,CS, | Performed by: EMERGENCY MEDICINE

## 2022-01-01 PROCEDURE — 99233 SBSQ HOSP IP/OBS HIGH 50: CPT | Mod: 57,GC,NTX, | Performed by: STUDENT IN AN ORGANIZED HEALTH CARE EDUCATION/TRAINING PROGRAM

## 2022-01-01 PROCEDURE — 27000190 HC CPAP FULL FACE MASK W/VALVE: Mod: NTX

## 2022-01-01 PROCEDURE — 1101F PR PT FALLS ASSESS DOC 0-1 FALLS W/OUT INJ PAST YR: ICD-10-PCS | Mod: CPTII,S$GLB,, | Performed by: NURSE PRACTITIONER

## 2022-01-01 PROCEDURE — 99223 1ST HOSP IP/OBS HIGH 75: CPT | Mod: AI,,, | Performed by: STUDENT IN AN ORGANIZED HEALTH CARE EDUCATION/TRAINING PROGRAM

## 2022-01-01 PROCEDURE — 80202 ASSAY OF VANCOMYCIN: CPT | Mod: NTX | Performed by: EMERGENCY MEDICINE

## 2022-01-01 PROCEDURE — 84295 ASSAY OF SERUM SODIUM: CPT | Mod: NTX

## 2022-01-01 PROCEDURE — U0003 INFECTIOUS AGENT DETECTION BY NUCLEIC ACID (DNA OR RNA); SEVERE ACUTE RESPIRATORY SYNDROME CORONAVIRUS 2 (SARS-COV-2) (CORONAVIRUS DISEASE [COVID-19]), AMPLIFIED PROBE TECHNIQUE, MAKING USE OF HIGH THROUGHPUT TECHNOLOGIES AS DESCRIBED BY CMS-2020-01-R: HCPCS | Mod: NTX | Performed by: EMERGENCY MEDICINE

## 2022-01-01 PROCEDURE — C2629 INTRO/SHEATH, LASER: HCPCS | Mod: NTX | Performed by: STUDENT IN AN ORGANIZED HEALTH CARE EDUCATION/TRAINING PROGRAM

## 2022-01-01 PROCEDURE — 82150 ASSAY OF AMYLASE: CPT | Mod: NTX | Performed by: INTERNAL MEDICINE

## 2022-01-01 PROCEDURE — 80048 BASIC METABOLIC PNL TOTAL CA: CPT | Performed by: INTERNAL MEDICINE

## 2022-01-01 PROCEDURE — 83605 ASSAY OF LACTIC ACID: CPT | Mod: NTX | Performed by: PHYSICIAN ASSISTANT

## 2022-01-01 PROCEDURE — 99232 SBSQ HOSP IP/OBS MODERATE 35: CPT | Mod: NTX,,,

## 2022-01-01 PROCEDURE — 70450 CT HEAD WITHOUT CONTRAST: ICD-10-PCS | Mod: 26,NTX,, | Performed by: RADIOLOGY

## 2022-01-01 PROCEDURE — 99999 PR PBB SHADOW E&M-EST. PATIENT-LVL IV: CPT | Mod: PBBFAC,TXP,, | Performed by: INTERNAL MEDICINE

## 2022-01-01 PROCEDURE — 99999 PR PBB SHADOW E&M-EST. PATIENT-LVL IV: ICD-10-PCS | Mod: PBBFAC,,, | Performed by: INTERNAL MEDICINE

## 2022-01-01 PROCEDURE — 83880 ASSAY OF NATRIURETIC PEPTIDE: CPT | Mod: NTX | Performed by: PHYSICIAN ASSISTANT

## 2022-01-01 PROCEDURE — 97165 OT EVAL LOW COMPLEX 30 MIN: CPT

## 2022-01-01 PROCEDURE — 83090 ASSAY OF HOMOCYSTEINE: CPT | Mod: NTX | Performed by: INTERNAL MEDICINE

## 2022-01-01 PROCEDURE — 3008F PR BODY MASS INDEX (BMI) DOCUMENTED: ICD-10-PCS | Mod: CPTII,S$GLB,TXP, | Performed by: THORACIC SURGERY (CARDIOTHORACIC VASCULAR SURGERY)

## 2022-01-01 PROCEDURE — 1159F PR MEDICATION LIST DOCUMENTED IN MEDICAL RECORD: ICD-10-PCS | Mod: CPTII,S$GLB,, | Performed by: NURSE PRACTITIONER

## 2022-01-01 PROCEDURE — 27100025 HC TUBING, SET FLUID WARMER: Mod: NTX | Performed by: ANESTHESIOLOGY

## 2022-01-01 PROCEDURE — 25000003 PHARM REV CODE 250: Performed by: HOSPITALIST

## 2022-01-01 PROCEDURE — C1893 INTRO/SHEATH, FIXED,NON-PEEL: HCPCS | Mod: NTX | Performed by: STUDENT IN AN ORGANIZED HEALTH CARE EDUCATION/TRAINING PROGRAM

## 2022-01-01 PROCEDURE — 85301 ANTITHROMBIN III ANTIGEN: CPT | Mod: NTX | Performed by: INTERNAL MEDICINE

## 2022-01-01 PROCEDURE — 99233 PR SUBSEQUENT HOSPITAL CARE,LEVL III: ICD-10-PCS | Mod: 57,GC,NTX, | Performed by: STUDENT IN AN ORGANIZED HEALTH CARE EDUCATION/TRAINING PROGRAM

## 2022-01-01 PROCEDURE — 99239 PR HOSPITAL DISCHARGE DAY,>30 MIN: ICD-10-PCS | Mod: GC,,, | Performed by: INTERNAL MEDICINE

## 2022-01-01 PROCEDURE — 87086 URINE CULTURE/COLONY COUNT: CPT | Performed by: STUDENT IN AN ORGANIZED HEALTH CARE EDUCATION/TRAINING PROGRAM

## 2022-01-01 PROCEDURE — 86140 C-REACTIVE PROTEIN: CPT | Mod: NTX | Performed by: INTERNAL MEDICINE

## 2022-01-01 PROCEDURE — 84550 ASSAY OF BLOOD/URIC ACID: CPT | Mod: TXP | Performed by: INTERNAL MEDICINE

## 2022-01-01 PROCEDURE — 83690 ASSAY OF LIPASE: CPT | Mod: NTX | Performed by: STUDENT IN AN ORGANIZED HEALTH CARE EDUCATION/TRAINING PROGRAM

## 2022-01-01 PROCEDURE — 99205 OFFICE O/P NEW HI 60 MIN: CPT | Mod: S$GLB,TXP,, | Performed by: THORACIC SURGERY (CARDIOTHORACIC VASCULAR SURGERY)

## 2022-01-01 PROCEDURE — 81001 URINALYSIS AUTO W/SCOPE: CPT | Mod: NTX | Performed by: EMERGENCY MEDICINE

## 2022-01-01 PROCEDURE — 81003 URINALYSIS AUTO W/O SCOPE: CPT | Performed by: STUDENT IN AN ORGANIZED HEALTH CARE EDUCATION/TRAINING PROGRAM

## 2022-01-01 PROCEDURE — 71046 X-RAY EXAM CHEST 2 VIEWS: CPT | Mod: 26,NTX,, | Performed by: RADIOLOGY

## 2022-01-01 PROCEDURE — 80048 BASIC METABOLIC PNL TOTAL CA: CPT | Performed by: STUDENT IN AN ORGANIZED HEALTH CARE EDUCATION/TRAINING PROGRAM

## 2022-01-01 PROCEDURE — 1159F MED LIST DOCD IN RCRD: CPT | Mod: CPTII,S$GLB,, | Performed by: NURSE PRACTITIONER

## 2022-01-01 PROCEDURE — 94760 N-INVAS EAR/PLS OXIMETRY 1: CPT

## 2022-01-01 PROCEDURE — 36600 WITHDRAWAL OF ARTERIAL BLOOD: CPT | Mod: NTX

## 2022-01-01 PROCEDURE — 45380 PR COLONOSCOPY,BIOPSY: ICD-10-PCS | Mod: PT,NTX,, | Performed by: STUDENT IN AN ORGANIZED HEALTH CARE EDUCATION/TRAINING PROGRAM

## 2022-01-01 PROCEDURE — 99223 1ST HOSP IP/OBS HIGH 75: CPT | Mod: GC,NTX,, | Performed by: STUDENT IN AN ORGANIZED HEALTH CARE EDUCATION/TRAINING PROGRAM

## 2022-01-01 PROCEDURE — 33241 REMOVE PULSE GENERATOR: CPT | Mod: GC,NTX | Performed by: STUDENT IN AN ORGANIZED HEALTH CARE EDUCATION/TRAINING PROGRAM

## 2022-01-01 PROCEDURE — 97116 GAIT TRAINING THERAPY: CPT

## 2022-01-01 PROCEDURE — 37000009 HC ANESTHESIA EA ADD 15 MINS: Mod: NTX

## 2022-01-01 PROCEDURE — 3044F HG A1C LEVEL LT 7.0%: CPT | Mod: CPTII,NTX,S$GLB, | Performed by: STUDENT IN AN ORGANIZED HEALTH CARE EDUCATION/TRAINING PROGRAM

## 2022-01-01 PROCEDURE — 93451 PR RIGHT HEART CATH O2 SATURATION & CARDIAC OUTPUT: ICD-10-PCS | Mod: 26,NTX,, | Performed by: INTERNAL MEDICINE

## 2022-01-01 PROCEDURE — 45380 COLONOSCOPY AND BIOPSY: CPT | Mod: PT,NTX | Performed by: STUDENT IN AN ORGANIZED HEALTH CARE EDUCATION/TRAINING PROGRAM

## 2022-01-01 PROCEDURE — 85305 CLOT INHIBIT PROT S TOTAL: CPT | Mod: NTX | Performed by: INTERNAL MEDICINE

## 2022-01-01 PROCEDURE — 85610 PROTHROMBIN TIME: CPT | Mod: 91,TXP | Performed by: INTERNAL MEDICINE

## 2022-01-01 PROCEDURE — 33244 REMOVE ELCTRD TRANSVENOUSLY: CPT | Mod: 22,GC,NTX, | Performed by: STUDENT IN AN ORGANIZED HEALTH CARE EDUCATION/TRAINING PROGRAM

## 2022-01-01 PROCEDURE — 99233 PR SUBSEQUENT HOSPITAL CARE,LEVL III: ICD-10-PCS | Mod: ,,, | Performed by: INTERNAL MEDICINE

## 2022-01-01 RX ORDER — HYDROCODONE BITARTRATE AND ACETAMINOPHEN 5; 325 MG/1; MG/1
1 TABLET ORAL EVERY 6 HOURS PRN
Status: DISCONTINUED | OUTPATIENT
Start: 2022-01-01 | End: 2022-01-01 | Stop reason: HOSPADM

## 2022-01-01 RX ORDER — ACETAMINOPHEN 325 MG/1
650 TABLET ORAL EVERY 6 HOURS PRN
Status: DISCONTINUED | OUTPATIENT
Start: 2022-01-01 | End: 2022-01-01

## 2022-01-01 RX ORDER — SACUBITRIL AND VALSARTAN 49; 51 MG/1; MG/1
1 TABLET, FILM COATED ORAL 2 TIMES DAILY
Qty: 60 TABLET | Refills: 1 | Status: ON HOLD | OUTPATIENT
Start: 2022-01-01 | End: 2022-01-01 | Stop reason: SDUPTHER

## 2022-01-01 RX ORDER — DIPHENHYDRAMINE HYDROCHLORIDE 50 MG/ML
25 INJECTION INTRAMUSCULAR; INTRAVENOUS
Status: COMPLETED | OUTPATIENT
Start: 2022-01-01 | End: 2022-01-01

## 2022-01-01 RX ORDER — METOPROLOL SUCCINATE 25 MG/1
25 TABLET, EXTENDED RELEASE ORAL DAILY
Qty: 90 TABLET | Refills: 3 | Status: SHIPPED | OUTPATIENT
Start: 2022-01-01 | End: 2022-01-01

## 2022-01-01 RX ORDER — FUROSEMIDE 10 MG/ML
80 INJECTION INTRAMUSCULAR; INTRAVENOUS ONCE
Status: COMPLETED | OUTPATIENT
Start: 2022-01-01 | End: 2022-01-01

## 2022-01-01 RX ORDER — SODIUM CHLORIDE 0.9 % (FLUSH) 0.9 %
10 SYRINGE (ML) INJECTION
Status: DISCONTINUED | OUTPATIENT
Start: 2022-01-01 | End: 2022-01-01 | Stop reason: HOSPADM

## 2022-01-01 RX ORDER — LEVOFLOXACIN 5 MG/ML
500 INJECTION, SOLUTION INTRAVENOUS
Status: DISCONTINUED | OUTPATIENT
Start: 2022-01-01 | End: 2022-01-01

## 2022-01-01 RX ORDER — MAGNESIUM SULFATE HEPTAHYDRATE 40 MG/ML
2 INJECTION, SOLUTION INTRAVENOUS
Status: COMPLETED | OUTPATIENT
Start: 2022-01-01 | End: 2022-01-01

## 2022-01-01 RX ORDER — SPIRONOLACTONE 25 MG/1
25 TABLET ORAL DAILY
Status: DISCONTINUED | OUTPATIENT
Start: 2022-01-01 | End: 2022-01-01

## 2022-01-01 RX ORDER — ASPIRIN 81 MG/1
81 TABLET ORAL DAILY
Status: DISCONTINUED | OUTPATIENT
Start: 2022-01-01 | End: 2022-01-01 | Stop reason: HOSPADM

## 2022-01-01 RX ORDER — FUROSEMIDE 40 MG/1
40 TABLET ORAL DAILY
Qty: 30 TABLET | Refills: 0 | Status: ON HOLD
Start: 2022-01-01 | End: 2022-01-01 | Stop reason: SDUPTHER

## 2022-01-01 RX ORDER — ATORVASTATIN CALCIUM 40 MG/1
80 TABLET, FILM COATED ORAL NIGHTLY
Status: DISCONTINUED | OUTPATIENT
Start: 2022-01-01 | End: 2022-01-01 | Stop reason: HOSPADM

## 2022-01-01 RX ORDER — SIMETHICONE 80 MG
80 TABLET,CHEWABLE ORAL EVERY 6 HOURS PRN
Qty: 60 TABLET | Refills: 1 | Status: ON HOLD | OUTPATIENT
Start: 2022-01-01 | End: 2022-01-01 | Stop reason: HOSPADM

## 2022-01-01 RX ORDER — VANCOMYCIN HYDROCHLORIDE 125 MG/1
125 CAPSULE ORAL 4 TIMES DAILY
Qty: 56 CAPSULE | Refills: 0 | Status: SHIPPED | OUTPATIENT
Start: 2022-01-01 | End: 2022-01-01

## 2022-01-01 RX ORDER — HALOPERIDOL 5 MG/ML
INJECTION INTRAMUSCULAR
Status: DISCONTINUED | OUTPATIENT
Start: 2022-01-01 | End: 2022-01-01

## 2022-01-01 RX ORDER — ASPIRIN 81 MG/1
81 TABLET ORAL DAILY
Qty: 90 TABLET | Refills: 3 | Status: SHIPPED | OUTPATIENT
Start: 2022-01-01 | End: 2022-01-01

## 2022-01-01 RX ORDER — PROPOFOL 10 MG/ML
VIAL (ML) INTRAVENOUS CONTINUOUS PRN
Status: DISCONTINUED | OUTPATIENT
Start: 2022-01-01 | End: 2022-01-01

## 2022-01-01 RX ORDER — MUPIROCIN 20 MG/G
OINTMENT TOPICAL 2 TIMES DAILY
Status: DISCONTINUED | OUTPATIENT
Start: 2022-01-01 | End: 2022-01-01 | Stop reason: HOSPADM

## 2022-01-01 RX ORDER — PROPOFOL 10 MG/ML
INJECTION, EMULSION INTRAVENOUS
Status: COMPLETED
Start: 2022-01-01 | End: 2022-01-01

## 2022-01-01 RX ORDER — LIDOCAINE HYDROCHLORIDE 10 MG/ML
10 INJECTION, SOLUTION EPIDURAL; INFILTRATION; INTRACAUDAL; PERINEURAL ONCE
Status: COMPLETED | OUTPATIENT
Start: 2022-01-01 | End: 2022-01-01

## 2022-01-01 RX ORDER — LIDOCAINE HYDROCHLORIDE 10 MG/ML
INJECTION INFILTRATION; PERINEURAL
Status: DISCONTINUED
Start: 2022-01-01 | End: 2022-01-01 | Stop reason: WASHOUT

## 2022-01-01 RX ORDER — NALOXONE HCL 0.4 MG/ML
0.02 VIAL (ML) INJECTION
Status: DISCONTINUED | OUTPATIENT
Start: 2022-01-01 | End: 2022-01-01 | Stop reason: HOSPADM

## 2022-01-01 RX ORDER — DIPHENHYDRAMINE HCL 25 MG
25 CAPSULE ORAL EVERY 6 HOURS PRN
Status: DISCONTINUED | OUTPATIENT
Start: 2022-01-01 | End: 2022-01-01

## 2022-01-01 RX ORDER — SACUBITRIL AND VALSARTAN 49; 51 MG/1; MG/1
1 TABLET, FILM COATED ORAL 2 TIMES DAILY
Qty: 60 TABLET | Refills: 3 | Status: ON HOLD | OUTPATIENT
Start: 2022-01-01 | End: 2022-01-01 | Stop reason: HOSPADM

## 2022-01-01 RX ORDER — SIMETHICONE 80 MG
80 TABLET,CHEWABLE ORAL EVERY 6 HOURS PRN
Qty: 60 TABLET | Refills: 1 | Status: ON HOLD | OUTPATIENT
Start: 2022-01-01 | End: 2022-01-01 | Stop reason: SDUPTHER

## 2022-01-01 RX ORDER — OLOPATADINE HYDROCHLORIDE 2 MG/ML
1 SOLUTION/ DROPS OPHTHALMIC DAILY
Status: DISCONTINUED | OUTPATIENT
Start: 2022-01-01 | End: 2022-01-01 | Stop reason: HOSPADM

## 2022-01-01 RX ORDER — POTASSIUM CHLORIDE 29.8 MG/ML
40 INJECTION INTRAVENOUS ONCE
Status: COMPLETED | OUTPATIENT
Start: 2022-01-01 | End: 2022-01-01

## 2022-01-01 RX ORDER — FUROSEMIDE 40 MG/1
40 TABLET ORAL DAILY
Status: DISCONTINUED | OUTPATIENT
Start: 2022-01-01 | End: 2022-01-01

## 2022-01-01 RX ORDER — POLYETHYLENE GLYCOL 3350, SODIUM SULFATE ANHYDROUS, SODIUM BICARBONATE, SODIUM CHLORIDE, POTASSIUM CHLORIDE 236; 22.74; 6.74; 5.86; 2.97 G/4L; G/4L; G/4L; G/4L; G/4L
POWDER, FOR SOLUTION ORAL
Status: ON HOLD | COMMUNITY
Start: 2022-01-01 | End: 2022-01-01 | Stop reason: HOSPADM

## 2022-01-01 RX ORDER — MAGNESIUM SULFATE HEPTAHYDRATE 40 MG/ML
2 INJECTION, SOLUTION INTRAVENOUS ONCE
Status: COMPLETED | OUTPATIENT
Start: 2022-01-01 | End: 2022-01-01

## 2022-01-01 RX ORDER — HEPARIN SODIUM,PORCINE/D5W 25000/250
0-40 INTRAVENOUS SOLUTION INTRAVENOUS CONTINUOUS
Status: DISCONTINUED | OUTPATIENT
Start: 2022-01-01 | End: 2022-01-01 | Stop reason: HOSPADM

## 2022-01-01 RX ORDER — FUROSEMIDE 40 MG/1
40 TABLET ORAL DAILY PRN
Qty: 30 TABLET | Refills: 11 | Status: ON HOLD | OUTPATIENT
Start: 2022-01-01 | End: 2022-01-01 | Stop reason: SDUPTHER

## 2022-01-01 RX ORDER — SUCRALFATE 1 G/10ML
1 SUSPENSION ORAL 2 TIMES DAILY
Status: DISCONTINUED | OUTPATIENT
Start: 2022-01-01 | End: 2022-01-01 | Stop reason: HOSPADM

## 2022-01-01 RX ORDER — ETOMIDATE 2 MG/ML
INJECTION INTRAVENOUS
Status: DISCONTINUED | OUTPATIENT
Start: 2022-01-01 | End: 2022-01-01

## 2022-01-01 RX ORDER — ACETAMINOPHEN 325 MG/1
650 TABLET ORAL EVERY 6 HOURS PRN
Status: DISCONTINUED | OUTPATIENT
Start: 2022-01-01 | End: 2022-01-01 | Stop reason: HOSPADM

## 2022-01-01 RX ORDER — AMOXICILLIN 250 MG
1 CAPSULE ORAL 2 TIMES DAILY
Status: DISCONTINUED | OUTPATIENT
Start: 2022-01-01 | End: 2022-01-01 | Stop reason: HOSPADM

## 2022-01-01 RX ORDER — ONDANSETRON 2 MG/ML
INJECTION INTRAMUSCULAR; INTRAVENOUS
Status: DISCONTINUED | OUTPATIENT
Start: 2022-01-01 | End: 2022-01-01

## 2022-01-01 RX ORDER — AMIODARONE HYDROCHLORIDE 200 MG/1
TABLET ORAL
Qty: 135 TABLET | Refills: 3 | Status: SHIPPED | OUTPATIENT
Start: 2022-01-01 | End: 2022-01-01 | Stop reason: SDUPTHER

## 2022-01-01 RX ORDER — POLYETHYLENE GLYCOL 3350 17 G/17G
17 POWDER, FOR SOLUTION ORAL DAILY
Status: DISCONTINUED | OUTPATIENT
Start: 2022-01-01 | End: 2022-01-01

## 2022-01-01 RX ORDER — BUMETANIDE 1 MG/1
4 TABLET ORAL ONCE
Status: DISCONTINUED | OUTPATIENT
Start: 2022-01-01 | End: 2022-01-01

## 2022-01-01 RX ORDER — POTASSIUM CHLORIDE 20 MEQ/1
20 TABLET, EXTENDED RELEASE ORAL ONCE
Status: COMPLETED | OUTPATIENT
Start: 2022-01-01 | End: 2022-01-01

## 2022-01-01 RX ORDER — METRONIDAZOLE 500 MG/1
500 TABLET ORAL EVERY 8 HOURS
Status: DISCONTINUED | OUTPATIENT
Start: 2022-01-01 | End: 2022-01-01

## 2022-01-01 RX ORDER — MIDODRINE HYDROCHLORIDE 2.5 MG/1
2.5 TABLET ORAL 3 TIMES DAILY
Status: DISCONTINUED | OUTPATIENT
Start: 2022-01-01 | End: 2022-01-01

## 2022-01-01 RX ORDER — DOBUTAMINE HYDROCHLORIDE 200 MG/100ML
188-940 INJECTION INTRAVENOUS
Status: ON HOLD | COMMUNITY
Start: 2022-01-01 | End: 2022-01-01 | Stop reason: SDUPTHER

## 2022-01-01 RX ORDER — MIDODRINE HYDROCHLORIDE 2.5 MG/1
2.5 TABLET ORAL 3 TIMES DAILY
Qty: 90 TABLET | Refills: 0
Start: 2022-01-01 | End: 2022-01-01

## 2022-01-01 RX ORDER — HALOPERIDOL 5 MG/ML
0.5 INJECTION INTRAMUSCULAR EVERY 10 MIN PRN
Status: DISCONTINUED | OUTPATIENT
Start: 2022-01-01 | End: 2022-01-01

## 2022-01-01 RX ORDER — HYDROCODONE BITARTRATE AND ACETAMINOPHEN 10; 325 MG/1; MG/1
1 TABLET ORAL EVERY 4 HOURS PRN
Status: DISCONTINUED | OUTPATIENT
Start: 2022-01-01 | End: 2022-01-01 | Stop reason: HOSPADM

## 2022-01-01 RX ORDER — AMOXICILLIN 250 MG
1 CAPSULE ORAL DAILY PRN
Status: DISCONTINUED | OUTPATIENT
Start: 2022-01-01 | End: 2022-01-01 | Stop reason: HOSPADM

## 2022-01-01 RX ORDER — METOPROLOL SUCCINATE 25 MG/1
25 TABLET, EXTENDED RELEASE ORAL DAILY
Status: DISCONTINUED | OUTPATIENT
Start: 2022-01-01 | End: 2022-01-01

## 2022-01-01 RX ORDER — ACETAMINOPHEN 325 MG/1
650 TABLET ORAL 4 TIMES DAILY
Status: DISCONTINUED | OUTPATIENT
Start: 2022-01-01 | End: 2022-01-01

## 2022-01-01 RX ORDER — DEXMEDETOMIDINE HYDROCHLORIDE 100 UG/ML
INJECTION, SOLUTION INTRAVENOUS
Status: DISCONTINUED | OUTPATIENT
Start: 2022-01-01 | End: 2022-01-01

## 2022-01-01 RX ORDER — LORAZEPAM 2 MG/ML
0.5 INJECTION INTRAMUSCULAR ONCE
Status: COMPLETED | OUTPATIENT
Start: 2022-01-01 | End: 2022-01-01

## 2022-01-01 RX ORDER — ALLOPURINOL 100 MG/1
200 TABLET ORAL
Status: DISCONTINUED | OUTPATIENT
Start: 2022-01-01 | End: 2022-01-01 | Stop reason: HOSPADM

## 2022-01-01 RX ORDER — SODIUM CHLORIDE 9 MG/ML
INJECTION, SOLUTION INTRAVENOUS CONTINUOUS
Status: DISCONTINUED | OUTPATIENT
Start: 2022-01-01 | End: 2022-01-01 | Stop reason: HOSPADM

## 2022-01-01 RX ORDER — ONDANSETRON 2 MG/ML
4 INJECTION INTRAMUSCULAR; INTRAVENOUS DAILY PRN
Status: DISCONTINUED | OUTPATIENT
Start: 2022-01-01 | End: 2022-01-01

## 2022-01-01 RX ORDER — PHENYLEPHRINE HCL IN 0.9% NACL 1 MG/10 ML
100 SYRINGE (ML) INTRAVENOUS ONCE
Status: COMPLETED | OUTPATIENT
Start: 2022-01-01 | End: 2022-01-01

## 2022-01-01 RX ORDER — ONDANSETRON 4 MG/1
4 TABLET, FILM COATED ORAL ONCE
Status: COMPLETED | OUTPATIENT
Start: 2022-01-01 | End: 2022-01-01

## 2022-01-01 RX ORDER — AMOXICILLIN AND CLAVULANATE POTASSIUM 875; 125 MG/1; MG/1
1 TABLET, FILM COATED ORAL EVERY 12 HOURS
Qty: 10 TABLET | Refills: 0 | Status: SHIPPED | OUTPATIENT
Start: 2022-01-01 | End: 2022-01-01

## 2022-01-01 RX ORDER — VANCOMYCIN HCL IN 5 % DEXTROSE 1G/250ML
1000 PLASTIC BAG, INJECTION (ML) INTRAVENOUS ONCE
Status: COMPLETED | OUTPATIENT
Start: 2022-01-01 | End: 2022-01-01

## 2022-01-01 RX ORDER — ONDANSETRON 2 MG/ML
4 INJECTION INTRAMUSCULAR; INTRAVENOUS ONCE AS NEEDED
Status: DISCONTINUED | OUTPATIENT
Start: 2022-01-01 | End: 2022-01-01 | Stop reason: HOSPADM

## 2022-01-01 RX ORDER — TALC
6 POWDER (GRAM) TOPICAL NIGHTLY PRN
Status: DISCONTINUED | OUTPATIENT
Start: 2022-01-01 | End: 2022-01-01 | Stop reason: HOSPADM

## 2022-01-01 RX ORDER — DOBUTAMINE HYDROCHLORIDE 400 MG/100ML
2.5 INJECTION INTRAVENOUS CONTINUOUS
Status: DISCONTINUED | OUTPATIENT
Start: 2022-01-01 | End: 2022-01-01 | Stop reason: HOSPADM

## 2022-01-01 RX ORDER — PREDNISONE 20 MG/1
40 TABLET ORAL DAILY
Qty: 10 TABLET | Refills: 0 | Status: ON HOLD | OUTPATIENT
Start: 2022-01-01 | End: 2022-01-01 | Stop reason: HOSPADM

## 2022-01-01 RX ORDER — BUMETANIDE 2 MG/1
3 TABLET ORAL DAILY
Qty: 60 TABLET | Refills: 6 | Status: SHIPPED | OUTPATIENT
Start: 2022-01-01 | End: 2023-10-19

## 2022-01-01 RX ORDER — OXYCODONE AND ACETAMINOPHEN 5; 325 MG/1; MG/1
1 TABLET ORAL EVERY 6 HOURS PRN
Qty: 28 TABLET | Refills: 0 | Status: ON HOLD | OUTPATIENT
Start: 2022-01-01 | End: 2022-01-01 | Stop reason: HOSPADM

## 2022-01-01 RX ORDER — EMPAGLIFLOZIN 25 MG/1
25 TABLET, FILM COATED ORAL DAILY
Qty: 30 TABLET | Refills: 5 | Status: SHIPPED | OUTPATIENT
Start: 2022-01-01

## 2022-01-01 RX ORDER — FENTANYL CITRATE 50 UG/ML
INJECTION, SOLUTION INTRAMUSCULAR; INTRAVENOUS
Status: DISCONTINUED | OUTPATIENT
Start: 2022-01-01 | End: 2022-01-01

## 2022-01-01 RX ORDER — HEPARIN SODIUM,PORCINE/D5W 25000/250
0-40 INTRAVENOUS SOLUTION INTRAVENOUS CONTINUOUS
Status: DISCONTINUED | OUTPATIENT
Start: 2022-01-01 | End: 2022-01-01

## 2022-01-01 RX ORDER — AMIODARONE HYDROCHLORIDE 200 MG/1
TABLET ORAL
Qty: 90 TABLET | Refills: 3 | Status: SHIPPED | OUTPATIENT
Start: 2022-01-01

## 2022-01-01 RX ORDER — ENOXAPARIN SODIUM 100 MG/ML
40 INJECTION SUBCUTANEOUS EVERY 24 HOURS
Status: DISCONTINUED | OUTPATIENT
Start: 2022-01-01 | End: 2022-01-01 | Stop reason: HOSPADM

## 2022-01-01 RX ORDER — EMPAGLIFLOZIN 25 MG/1
25 TABLET, FILM COATED ORAL DAILY
Qty: 30 TABLET | Refills: 2 | Status: SHIPPED | OUTPATIENT
Start: 2022-01-01 | End: 2022-01-01

## 2022-01-01 RX ORDER — METOPROLOL SUCCINATE 25 MG/1
25 TABLET, EXTENDED RELEASE ORAL DAILY
Status: DISCONTINUED | OUTPATIENT
Start: 2022-01-01 | End: 2022-01-01 | Stop reason: HOSPADM

## 2022-01-01 RX ORDER — VANCOMYCIN HYDROCHLORIDE 1 G/20ML
INJECTION, POWDER, LYOPHILIZED, FOR SOLUTION INTRAVENOUS
Status: DISCONTINUED | OUTPATIENT
Start: 2022-01-01 | End: 2022-01-01 | Stop reason: HOSPADM

## 2022-01-01 RX ORDER — SODIUM CHLORIDE 9 MG/ML
INJECTION, SOLUTION INTRAVENOUS ONCE
Status: CANCELLED | OUTPATIENT
Start: 2022-01-01 | End: 2022-01-01

## 2022-01-01 RX ORDER — HYDROCODONE BITARTRATE AND ACETAMINOPHEN 5; 325 MG/1; MG/1
1 TABLET ORAL EVERY 4 HOURS PRN
Status: DISCONTINUED | OUTPATIENT
Start: 2022-01-01 | End: 2022-01-01

## 2022-01-01 RX ORDER — ATORVASTATIN CALCIUM 80 MG/1
80 TABLET, FILM COATED ORAL DAILY
Qty: 90 TABLET | Refills: 1 | Status: ON HOLD | OUTPATIENT
Start: 2022-01-01 | End: 2022-01-01 | Stop reason: SDUPTHER

## 2022-01-01 RX ORDER — AMIODARONE HYDROCHLORIDE 100 MG/1
300 TABLET ORAL DAILY
Status: DISCONTINUED | OUTPATIENT
Start: 2022-01-01 | End: 2022-01-01 | Stop reason: HOSPADM

## 2022-01-01 RX ORDER — LIDOCAINE HYDROCHLORIDE 10 MG/ML
INJECTION INFILTRATION; PERINEURAL
Status: DISCONTINUED | OUTPATIENT
Start: 2022-01-01 | End: 2022-01-01 | Stop reason: HOSPADM

## 2022-01-01 RX ORDER — GLUCAGON 1 MG
1 KIT INJECTION
Status: DISCONTINUED | OUTPATIENT
Start: 2022-01-01 | End: 2022-01-01 | Stop reason: HOSPADM

## 2022-01-01 RX ORDER — DIPHENHYDRAMINE HYDROCHLORIDE 50 MG/ML
25 INJECTION INTRAMUSCULAR; INTRAVENOUS EVERY 6 HOURS PRN
Status: DISCONTINUED | OUTPATIENT
Start: 2022-01-01 | End: 2022-01-01 | Stop reason: HOSPADM

## 2022-01-01 RX ORDER — DOBUTAMINE HYDROCHLORIDE 400 MG/100ML
2.5 INJECTION INTRAVENOUS CONTINUOUS
Status: DISCONTINUED | OUTPATIENT
Start: 2022-01-01 | End: 2022-01-01

## 2022-01-01 RX ORDER — FENTANYL CITRATE 50 UG/ML
25 INJECTION, SOLUTION INTRAMUSCULAR; INTRAVENOUS EVERY 5 MIN PRN
Status: DISCONTINUED | OUTPATIENT
Start: 2022-01-01 | End: 2022-01-01 | Stop reason: HOSPADM

## 2022-01-01 RX ORDER — LIDOCAINE HYDROCHLORIDE 20 MG/ML
15 SOLUTION OROPHARYNGEAL ONCE
Status: DISCONTINUED | OUTPATIENT
Start: 2022-01-01 | End: 2022-01-01 | Stop reason: HOSPADM

## 2022-01-01 RX ORDER — SODIUM CHLORIDE 0.9 % (FLUSH) 0.9 %
3 SYRINGE (ML) INJECTION
Status: DISCONTINUED | OUTPATIENT
Start: 2022-01-01 | End: 2022-01-01 | Stop reason: HOSPADM

## 2022-01-01 RX ORDER — SODIUM CHLORIDE 0.9 % (FLUSH) 0.9 %
10 SYRINGE (ML) INJECTION
Status: CANCELLED | OUTPATIENT
Start: 2022-01-01

## 2022-01-01 RX ORDER — HYDROCODONE BITARTRATE AND ACETAMINOPHEN 10; 325 MG/1; MG/1
1 TABLET ORAL EVERY 4 HOURS PRN
Status: DISCONTINUED | OUTPATIENT
Start: 2022-01-01 | End: 2022-01-01

## 2022-01-01 RX ORDER — HYDRALAZINE HYDROCHLORIDE 20 MG/ML
INJECTION INTRAMUSCULAR; INTRAVENOUS
Status: DISPENSED
Start: 2022-01-01 | End: 2022-01-01

## 2022-01-01 RX ORDER — FENTANYL CITRATE 50 UG/ML
25 INJECTION, SOLUTION INTRAMUSCULAR; INTRAVENOUS EVERY 5 MIN PRN
Status: DISCONTINUED | OUTPATIENT
Start: 2022-01-01 | End: 2022-01-01

## 2022-01-01 RX ORDER — MIDAZOLAM HYDROCHLORIDE 1 MG/ML
INJECTION, SOLUTION INTRAMUSCULAR; INTRAVENOUS
Status: DISCONTINUED | OUTPATIENT
Start: 2022-01-01 | End: 2022-01-01

## 2022-01-01 RX ORDER — PHENYLEPHRINE HYDROCHLORIDE 10 MG/ML
INJECTION INTRAVENOUS
Status: DISCONTINUED | OUTPATIENT
Start: 2022-01-01 | End: 2022-01-01

## 2022-01-01 RX ORDER — POLYETHYLENE GLYCOL 3350, SODIUM SULFATE ANHYDROUS, SODIUM BICARBONATE, SODIUM CHLORIDE, POTASSIUM CHLORIDE 236; 22.74; 6.74; 5.86; 2.97 G/4L; G/4L; G/4L; G/4L; G/4L
4 POWDER, FOR SOLUTION ORAL ONCE
Qty: 4000 ML | Refills: 0 | Status: SHIPPED | OUTPATIENT
Start: 2022-01-01 | End: 2022-01-01

## 2022-01-01 RX ORDER — AZITHROMYCIN 250 MG/1
500 TABLET, FILM COATED ORAL DAILY
Status: COMPLETED | OUTPATIENT
Start: 2022-01-01 | End: 2022-01-01

## 2022-01-01 RX ORDER — ATORVASTATIN CALCIUM 20 MG/1
80 TABLET, FILM COATED ORAL NIGHTLY
Status: DISCONTINUED | OUTPATIENT
Start: 2022-01-01 | End: 2022-01-01 | Stop reason: HOSPADM

## 2022-01-01 RX ORDER — MORPHINE SULFATE 2 MG/ML
6 INJECTION, SOLUTION INTRAMUSCULAR; INTRAVENOUS
Status: COMPLETED | OUTPATIENT
Start: 2022-01-01 | End: 2022-01-01

## 2022-01-01 RX ORDER — METRONIDAZOLE 500 MG/1
500 TABLET ORAL
Status: COMPLETED | OUTPATIENT
Start: 2022-01-01 | End: 2022-01-01

## 2022-01-01 RX ORDER — MAG HYDROX/ALUMINUM HYD/SIMETH 200-200-20
30 SUSPENSION, ORAL (FINAL DOSE FORM) ORAL ONCE
Status: DISCONTINUED | OUTPATIENT
Start: 2022-01-01 | End: 2022-01-01

## 2022-01-01 RX ORDER — FUROSEMIDE 10 MG/ML
80 INJECTION INTRAMUSCULAR; INTRAVENOUS ONCE
Status: DISCONTINUED | OUTPATIENT
Start: 2022-01-01 | End: 2022-01-01

## 2022-01-01 RX ORDER — EPINEPHRINE 1 MG/ML
INJECTION, SOLUTION INTRACARDIAC; INTRAMUSCULAR; INTRAVENOUS; SUBCUTANEOUS
Status: COMPLETED
Start: 2022-01-01 | End: 2022-01-01

## 2022-01-01 RX ORDER — HYDROCODONE BITARTRATE AND ACETAMINOPHEN 10; 325 MG/1; MG/1
1 TABLET ORAL EVERY 6 HOURS PRN
COMMUNITY
Start: 2022-01-01

## 2022-01-01 RX ORDER — SPIRONOLACTONE 25 MG/1
25 TABLET ORAL DAILY
Qty: 30 TABLET | Refills: 3 | Status: ON HOLD | OUTPATIENT
Start: 2022-01-01 | End: 2022-01-01 | Stop reason: HOSPADM

## 2022-01-01 RX ORDER — DEXMEDETOMIDINE HYDROCHLORIDE 4 UG/ML
0-1.4 INJECTION, SOLUTION INTRAVENOUS CONTINUOUS
Status: DISCONTINUED | OUTPATIENT
Start: 2022-01-01 | End: 2022-01-01

## 2022-01-01 RX ORDER — ENOXAPARIN SODIUM 100 MG/ML
30 INJECTION SUBCUTANEOUS EVERY 24 HOURS
Status: DISCONTINUED | OUTPATIENT
Start: 2022-01-01 | End: 2022-01-01

## 2022-01-01 RX ORDER — PANTOPRAZOLE SODIUM 40 MG/1
40 TABLET, DELAYED RELEASE ORAL
COMMUNITY
Start: 2022-01-01 | End: 2022-01-01

## 2022-01-01 RX ORDER — ONDANSETRON 2 MG/ML
4 INJECTION INTRAMUSCULAR; INTRAVENOUS EVERY 6 HOURS PRN
Status: DISCONTINUED | OUTPATIENT
Start: 2022-01-01 | End: 2022-01-01 | Stop reason: HOSPADM

## 2022-01-01 RX ORDER — HEPARIN SODIUM,PORCINE/D5W 25000/250
18 INTRAVENOUS SOLUTION INTRAVENOUS CONTINUOUS
Status: DISCONTINUED | OUTPATIENT
Start: 2022-01-01 | End: 2022-01-01

## 2022-01-01 RX ORDER — HEPARIN SODIUM 5000 [USP'U]/ML
5000 INJECTION, SOLUTION INTRAVENOUS; SUBCUTANEOUS EVERY 8 HOURS
Status: DISCONTINUED | OUTPATIENT
Start: 2022-01-01 | End: 2022-01-01

## 2022-01-01 RX ORDER — FUROSEMIDE 10 MG/ML
40 INJECTION INTRAMUSCULAR; INTRAVENOUS ONCE
Status: COMPLETED | OUTPATIENT
Start: 2022-01-01 | End: 2022-01-01

## 2022-01-01 RX ORDER — LIDOCAINE HYDROCHLORIDE 20 MG/ML
INJECTION, SOLUTION EPIDURAL; INFILTRATION; INTRACAUDAL; PERINEURAL
Status: DISCONTINUED | OUTPATIENT
Start: 2022-01-01 | End: 2022-01-01

## 2022-01-01 RX ORDER — ATORVASTATIN CALCIUM 80 MG/1
80 TABLET, FILM COATED ORAL DAILY
Qty: 90 TABLET | Refills: 1 | Status: SHIPPED | OUTPATIENT
Start: 2022-01-01

## 2022-01-01 RX ORDER — FUROSEMIDE 40 MG/1
40 TABLET ORAL 2 TIMES DAILY
Qty: 60 TABLET | Refills: 2 | Status: SHIPPED | OUTPATIENT
Start: 2022-01-01 | End: 2022-01-01 | Stop reason: SDUPTHER

## 2022-01-01 RX ORDER — IPRATROPIUM BROMIDE AND ALBUTEROL SULFATE 2.5; .5 MG/3ML; MG/3ML
3 SOLUTION RESPIRATORY (INHALATION) EVERY 4 HOURS PRN
Status: DISCONTINUED | OUTPATIENT
Start: 2022-01-01 | End: 2022-01-01 | Stop reason: HOSPADM

## 2022-01-01 RX ORDER — ALLOPURINOL 100 MG/1
200 TABLET ORAL DAILY
Status: DISCONTINUED | OUTPATIENT
Start: 2022-01-01 | End: 2022-01-01 | Stop reason: HOSPADM

## 2022-01-01 RX ORDER — ONDANSETRON 4 MG/1
4 TABLET, ORALLY DISINTEGRATING ORAL EVERY 6 HOURS PRN
Status: DISCONTINUED | OUTPATIENT
Start: 2022-01-01 | End: 2022-01-01 | Stop reason: HOSPADM

## 2022-01-01 RX ORDER — IBUPROFEN 200 MG
16 TABLET ORAL
Status: DISCONTINUED | OUTPATIENT
Start: 2022-01-01 | End: 2022-01-01 | Stop reason: HOSPADM

## 2022-01-01 RX ORDER — SPIRONOLACTONE 25 MG/1
25 TABLET ORAL DAILY
Status: DISCONTINUED | OUTPATIENT
Start: 2022-01-01 | End: 2022-01-01 | Stop reason: HOSPADM

## 2022-01-01 RX ORDER — VANCOMYCIN HYDROCHLORIDE 125 MG/1
125 CAPSULE ORAL 4 TIMES DAILY
Qty: 56 CAPSULE | Refills: 0 | Status: SHIPPED | OUTPATIENT
Start: 2022-01-01 | End: 2022-01-01 | Stop reason: SDUPTHER

## 2022-01-01 RX ORDER — ATORVASTATIN CALCIUM 20 MG/1
80 TABLET, FILM COATED ORAL DAILY
Status: DISCONTINUED | OUTPATIENT
Start: 2022-01-01 | End: 2022-01-01 | Stop reason: HOSPADM

## 2022-01-01 RX ORDER — NOREPINEPHRINE BITARTRATE/D5W 4MG/250ML
PLASTIC BAG, INJECTION (ML) INTRAVENOUS
Status: DISPENSED
Start: 2022-01-01 | End: 2022-01-01

## 2022-01-01 RX ORDER — BUMETANIDE 0.25 MG/ML
3 INJECTION INTRAMUSCULAR; INTRAVENOUS ONCE
Status: DISCONTINUED | OUTPATIENT
Start: 2022-01-01 | End: 2022-01-01

## 2022-01-01 RX ORDER — PREDNISONE 20 MG/1
40 TABLET ORAL DAILY
Qty: 8 TABLET | Refills: 0 | Status: SHIPPED | OUTPATIENT
Start: 2022-01-01 | End: 2022-01-01 | Stop reason: SDUPTHER

## 2022-01-01 RX ORDER — SODIUM CHLORIDE 0.9 % (FLUSH) 0.9 %
10 SYRINGE (ML) INJECTION
Status: DISCONTINUED | OUTPATIENT
Start: 2022-01-01 | End: 2022-01-01

## 2022-01-01 RX ORDER — SACUBITRIL AND VALSARTAN 24; 26 MG/1; MG/1
1 TABLET, FILM COATED ORAL 2 TIMES DAILY
Status: ON HOLD
Start: 2022-01-01 | End: 2022-01-01 | Stop reason: SDUPTHER

## 2022-01-01 RX ORDER — EPINEPHRINE 0.1 MG/ML
INJECTION INTRAVENOUS
Status: DISCONTINUED | OUTPATIENT
Start: 2022-01-01 | End: 2022-01-01

## 2022-01-01 RX ORDER — FUROSEMIDE 10 MG/ML
40 INJECTION INTRAMUSCULAR; INTRAVENOUS 2 TIMES DAILY
Status: DISCONTINUED | OUTPATIENT
Start: 2022-01-01 | End: 2022-01-01 | Stop reason: HOSPADM

## 2022-01-01 RX ORDER — ACETAMINOPHEN 325 MG/1
650 TABLET ORAL EVERY 4 HOURS PRN
Status: DISCONTINUED | OUTPATIENT
Start: 2022-01-01 | End: 2022-01-01 | Stop reason: HOSPADM

## 2022-01-01 RX ORDER — EPINEPHRINE 1 MG/ML
INJECTION, SOLUTION INTRACARDIAC; INTRAMUSCULAR; INTRAVENOUS; SUBCUTANEOUS
Status: DISPENSED
Start: 2022-01-01 | End: 2022-01-01

## 2022-01-01 RX ORDER — FENTANYL CITRATE 50 UG/ML
25 INJECTION, SOLUTION INTRAMUSCULAR; INTRAVENOUS ONCE
Status: COMPLETED | OUTPATIENT
Start: 2022-01-01 | End: 2022-01-01

## 2022-01-01 RX ORDER — DIPHENHYDRAMINE HCL 25 MG
25 CAPSULE ORAL
Status: DISCONTINUED | OUTPATIENT
Start: 2022-01-01 | End: 2022-01-01 | Stop reason: HOSPADM

## 2022-01-01 RX ORDER — ETOMIDATE 2 MG/ML
INJECTION INTRAVENOUS
Status: DISPENSED
Start: 2022-01-01 | End: 2022-01-01

## 2022-01-01 RX ORDER — PHENYLEPHRINE HCL IN 0.9% NACL 1 MG/10 ML
100 SYRINGE (ML) INTRAVENOUS
Status: COMPLETED | OUTPATIENT
Start: 2022-01-01 | End: 2022-01-01

## 2022-01-01 RX ORDER — SODIUM CHLORIDE 0.9 % (FLUSH) 0.9 %
10 SYRINGE (ML) INJECTION EVERY 6 HOURS
Status: DISCONTINUED | OUTPATIENT
Start: 2022-01-01 | End: 2022-01-01

## 2022-01-01 RX ORDER — METRONIDAZOLE 500 MG/100ML
500 INJECTION, SOLUTION INTRAVENOUS
Status: DISCONTINUED | OUTPATIENT
Start: 2022-01-01 | End: 2022-01-01

## 2022-01-01 RX ORDER — HYDROCODONE BITARTRATE AND ACETAMINOPHEN 500; 5 MG/1; MG/1
TABLET ORAL
Status: DISCONTINUED | OUTPATIENT
Start: 2022-01-01 | End: 2022-01-01 | Stop reason: HOSPADM

## 2022-01-01 RX ORDER — FAMOTIDINE 10 MG/ML
20 INJECTION INTRAVENOUS DAILY
Status: DISCONTINUED | OUTPATIENT
Start: 2022-01-01 | End: 2022-01-01

## 2022-01-01 RX ORDER — FUROSEMIDE 40 MG/1
40 TABLET ORAL DAILY PRN
Qty: 30 TABLET | Refills: 11 | Status: ON HOLD | OUTPATIENT
Start: 2022-01-01 | End: 2022-01-01 | Stop reason: HOSPADM

## 2022-01-01 RX ORDER — OXYCODONE AND ACETAMINOPHEN 5; 325 MG/1; MG/1
1 TABLET ORAL EVERY 6 HOURS PRN
Status: DISCONTINUED | OUTPATIENT
Start: 2022-01-01 | End: 2022-01-01 | Stop reason: HOSPADM

## 2022-01-01 RX ORDER — FUROSEMIDE 40 MG/1
40 TABLET ORAL 2 TIMES DAILY
Status: DISCONTINUED | OUTPATIENT
Start: 2022-01-01 | End: 2022-01-01

## 2022-01-01 RX ORDER — SPIRONOLACTONE 25 MG/1
25 TABLET ORAL DAILY
Qty: 30 TABLET | Refills: 2 | Status: ON HOLD | OUTPATIENT
Start: 2022-01-01 | End: 2022-01-01 | Stop reason: SDUPTHER

## 2022-01-01 RX ORDER — LIDOCAINE 50 MG/G
1 PATCH TOPICAL
Status: DISCONTINUED | OUTPATIENT
Start: 2022-01-01 | End: 2022-01-01 | Stop reason: HOSPADM

## 2022-01-01 RX ORDER — PHENYLEPHRINE HCL IN 0.9% NACL 1 MG/10 ML
SYRINGE (ML) INTRAVENOUS
Status: COMPLETED
Start: 2022-01-01 | End: 2022-01-01

## 2022-01-01 RX ORDER — LIDOCAINE HYDROCHLORIDE 20 MG/ML
INJECTION, SOLUTION EPIDURAL; INFILTRATION; INTRACAUDAL; PERINEURAL
Status: DISCONTINUED | OUTPATIENT
Start: 2022-01-01 | End: 2022-01-01 | Stop reason: HOSPADM

## 2022-01-01 RX ORDER — FUROSEMIDE 10 MG/ML
120 INJECTION INTRAMUSCULAR; INTRAVENOUS ONCE
Status: DISCONTINUED | OUTPATIENT
Start: 2022-01-01 | End: 2022-01-01 | Stop reason: SDUPTHER

## 2022-01-01 RX ORDER — PHENYLEPHRINE HCL IN 0.9% NACL 1 MG/10 ML
SYRINGE (ML) INTRAVENOUS
Status: DISCONTINUED
Start: 2022-01-01 | End: 2022-01-01 | Stop reason: WASHOUT

## 2022-01-01 RX ORDER — LIDOCAINE HYDROCHLORIDE 10 MG/ML
INJECTION, SOLUTION INTRAVENOUS
Status: DISCONTINUED | OUTPATIENT
Start: 2022-01-01 | End: 2022-01-01

## 2022-01-01 RX ORDER — DOBUTAMINE HYDROCHLORIDE 200 MG/100ML
INJECTION INTRAVENOUS
Qty: 250 ML | Refills: 5
Start: 2022-01-01 | End: 2022-01-01 | Stop reason: SDUPTHER

## 2022-01-01 RX ORDER — FAMOTIDINE 20 MG/1
20 TABLET, FILM COATED ORAL DAILY
Status: DISCONTINUED | OUTPATIENT
Start: 2022-01-01 | End: 2022-01-01 | Stop reason: HOSPADM

## 2022-01-01 RX ORDER — PROCHLORPERAZINE EDISYLATE 5 MG/ML
2.5 INJECTION INTRAMUSCULAR; INTRAVENOUS EVERY 6 HOURS PRN
Status: DISCONTINUED | OUTPATIENT
Start: 2022-01-01 | End: 2022-01-01 | Stop reason: HOSPADM

## 2022-01-01 RX ORDER — DEXAMETHASONE SODIUM PHOSPHATE 4 MG/ML
INJECTION, SOLUTION INTRA-ARTICULAR; INTRALESIONAL; INTRAMUSCULAR; INTRAVENOUS; SOFT TISSUE
Status: DISCONTINUED | OUTPATIENT
Start: 2022-01-01 | End: 2022-01-01

## 2022-01-01 RX ORDER — FUROSEMIDE 40 MG/1
40 TABLET ORAL DAILY
Qty: 60 TABLET | Refills: 2 | Status: ON HOLD
Start: 2022-01-01 | End: 2022-01-01 | Stop reason: HOSPADM

## 2022-01-01 RX ORDER — ONDANSETRON 4 MG/1
4 TABLET, ORALLY DISINTEGRATING ORAL EVERY 6 HOURS PRN
Qty: 30 TABLET | Refills: 1 | Status: ON HOLD | OUTPATIENT
Start: 2022-01-01 | End: 2022-01-01 | Stop reason: SDUPTHER

## 2022-01-01 RX ORDER — FENTANYL CITRATE 50 UG/ML
INJECTION, SOLUTION INTRAMUSCULAR; INTRAVENOUS
Status: DISPENSED
Start: 2022-01-01 | End: 2022-01-01

## 2022-01-01 RX ORDER — FUROSEMIDE 10 MG/ML
60 INJECTION INTRAMUSCULAR; INTRAVENOUS ONCE
Status: COMPLETED | OUTPATIENT
Start: 2022-01-01 | End: 2022-01-01

## 2022-01-01 RX ORDER — PROPOFOL 10 MG/ML
0-50 INJECTION, EMULSION INTRAVENOUS CONTINUOUS
Status: DISCONTINUED | OUTPATIENT
Start: 2022-01-01 | End: 2022-01-01

## 2022-01-01 RX ORDER — PARICALCITOL 5 UG/ML
2.5 INJECTION, SOLUTION INTRAVENOUS
COMMUNITY
Start: 2022-01-01 | End: 2022-01-01

## 2022-01-01 RX ORDER — ENOXAPARIN SODIUM 100 MG/ML
40 INJECTION SUBCUTANEOUS EVERY 24 HOURS
Status: DISCONTINUED | OUTPATIENT
Start: 2022-01-01 | End: 2022-01-01

## 2022-01-01 RX ORDER — MUPIROCIN 20 MG/G
OINTMENT TOPICAL 2 TIMES DAILY
Status: COMPLETED | OUTPATIENT
Start: 2022-01-01 | End: 2022-01-01

## 2022-01-01 RX ORDER — OXYCODONE HYDROCHLORIDE 5 MG/1
5 TABLET ORAL
Status: DISCONTINUED | OUTPATIENT
Start: 2022-01-01 | End: 2022-01-01 | Stop reason: HOSPADM

## 2022-01-01 RX ORDER — HEPARIN SODIUM 5000 [USP'U]/ML
5000 INJECTION, SOLUTION INTRAVENOUS; SUBCUTANEOUS EVERY 8 HOURS
Status: DISCONTINUED | OUTPATIENT
Start: 2022-01-01 | End: 2022-01-01 | Stop reason: HOSPADM

## 2022-01-01 RX ORDER — FUROSEMIDE 10 MG/ML
80 INJECTION INTRAMUSCULAR; INTRAVENOUS 3 TIMES DAILY
Status: DISCONTINUED | OUTPATIENT
Start: 2022-01-01 | End: 2022-01-01

## 2022-01-01 RX ORDER — POTASSIUM CHLORIDE 20 MEQ/1
20 TABLET, EXTENDED RELEASE ORAL DAILY
Status: DISCONTINUED | OUTPATIENT
Start: 2022-01-01 | End: 2022-01-01

## 2022-01-01 RX ORDER — SODIUM CHLORIDE 0.9 % (FLUSH) 0.9 %
10 SYRINGE (ML) INJECTION EVERY 12 HOURS PRN
Status: DISCONTINUED | OUTPATIENT
Start: 2022-01-01 | End: 2022-01-01 | Stop reason: HOSPADM

## 2022-01-01 RX ORDER — DOBUTAMINE HYDROCHLORIDE 200 MG/100ML
INJECTION INTRAVENOUS
Qty: 250 ML | Refills: 5
Start: 2022-01-01

## 2022-01-01 RX ORDER — BUPIVACAINE HYDROCHLORIDE 2.5 MG/ML
INJECTION, SOLUTION EPIDURAL; INFILTRATION; INTRACAUDAL
Status: DISCONTINUED | OUTPATIENT
Start: 2022-01-01 | End: 2022-01-01 | Stop reason: HOSPADM

## 2022-01-01 RX ORDER — AMIODARONE HYDROCHLORIDE 200 MG/1
TABLET ORAL
Qty: 135 TABLET | Refills: 3 | Status: ON HOLD | OUTPATIENT
Start: 2022-01-01 | End: 2022-01-01 | Stop reason: SDUPTHER

## 2022-01-01 RX ORDER — HEPARIN SODIUM,PORCINE/D5W 25000/250
18 INTRAVENOUS SOLUTION INTRAVENOUS CONTINUOUS
Status: DISPENSED | OUTPATIENT
Start: 2022-01-01 | End: 2022-01-01

## 2022-01-01 RX ORDER — ONDANSETRON 4 MG/1
4 TABLET, ORALLY DISINTEGRATING ORAL EVERY 6 HOURS PRN
Qty: 30 TABLET | Refills: 1 | Status: SHIPPED | OUTPATIENT
Start: 2022-01-01 | End: 2022-01-01

## 2022-01-01 RX ORDER — ASPIRIN 81 MG/1
81 TABLET ORAL DAILY
Qty: 90 TABLET | Refills: 3 | Status: ON HOLD | OUTPATIENT
Start: 2022-01-01 | End: 2022-01-01 | Stop reason: SDUPTHER

## 2022-01-01 RX ORDER — DEXMEDETOMIDINE HYDROCHLORIDE 4 UG/ML
INJECTION, SOLUTION INTRAVENOUS
Status: DISPENSED
Start: 2022-01-01 | End: 2022-01-01

## 2022-01-01 RX ORDER — POTASSIUM CHLORIDE 20 MEQ/1
40 TABLET, EXTENDED RELEASE ORAL ONCE
Status: DISCONTINUED | OUTPATIENT
Start: 2022-01-01 | End: 2022-01-01

## 2022-01-01 RX ORDER — ONDANSETRON 2 MG/ML
4 INJECTION INTRAMUSCULAR; INTRAVENOUS DAILY PRN
Status: DISCONTINUED | OUTPATIENT
Start: 2022-01-01 | End: 2022-01-01 | Stop reason: HOSPADM

## 2022-01-01 RX ORDER — ONDANSETRON 2 MG/ML
4 INJECTION INTRAMUSCULAR; INTRAVENOUS
Status: COMPLETED | OUTPATIENT
Start: 2022-01-01 | End: 2022-01-01

## 2022-01-01 RX ORDER — ALLOPURINOL 100 MG/1
200 TABLET ORAL DAILY
Qty: 60 TABLET | Refills: 0 | Status: SHIPPED | OUTPATIENT
Start: 2022-01-01

## 2022-01-01 RX ORDER — PREDNISONE 20 MG/1
40 TABLET ORAL DAILY
Status: DISCONTINUED | OUTPATIENT
Start: 2022-01-01 | End: 2022-01-01 | Stop reason: HOSPADM

## 2022-01-01 RX ORDER — COLCHICINE 0.6 MG/1
1.2 TABLET, FILM COATED ORAL ONCE
Status: COMPLETED | OUTPATIENT
Start: 2022-01-01 | End: 2022-01-01

## 2022-01-01 RX ORDER — METHOCARBAMOL 500 MG/1
500 TABLET, FILM COATED ORAL 3 TIMES DAILY PRN
Status: DISCONTINUED | OUTPATIENT
Start: 2022-01-01 | End: 2022-01-01 | Stop reason: HOSPADM

## 2022-01-01 RX ORDER — DIPHENHYDRAMINE HCL 25 MG
25 CAPSULE ORAL EVERY 6 HOURS PRN
Status: DISCONTINUED | OUTPATIENT
Start: 2022-01-01 | End: 2022-01-01 | Stop reason: HOSPADM

## 2022-01-01 RX ORDER — FUROSEMIDE 10 MG/ML
40 INJECTION INTRAMUSCULAR; INTRAVENOUS
Status: COMPLETED | OUTPATIENT
Start: 2022-01-01 | End: 2022-01-01

## 2022-01-01 RX ORDER — COLCHICINE 0.6 MG/1
0.6 TABLET, FILM COATED ORAL DAILY
Status: DISCONTINUED | OUTPATIENT
Start: 2022-01-01 | End: 2022-01-01

## 2022-01-01 RX ORDER — METOPROLOL SUCCINATE 50 MG/1
50 TABLET, EXTENDED RELEASE ORAL DAILY
Status: DISCONTINUED | OUTPATIENT
Start: 2022-01-01 | End: 2022-01-01

## 2022-01-01 RX ORDER — HYDROCODONE BITARTRATE AND ACETAMINOPHEN 10; 325 MG/1; MG/1
1 TABLET ORAL
Status: COMPLETED | OUTPATIENT
Start: 2022-01-01 | End: 2022-01-01

## 2022-01-01 RX ORDER — HYDROCODONE BITARTRATE AND ACETAMINOPHEN 10; 325 MG/1; MG/1
1 TABLET ORAL EVERY 6 HOURS PRN
Status: DISCONTINUED | OUTPATIENT
Start: 2022-01-01 | End: 2022-01-01 | Stop reason: HOSPADM

## 2022-01-01 RX ORDER — VALSARTAN 40 MG/1
40 TABLET ORAL DAILY
Status: DISCONTINUED | OUTPATIENT
Start: 2022-01-01 | End: 2022-01-01

## 2022-01-01 RX ORDER — SODIUM CHLORIDE 9 MG/ML
INJECTION, SOLUTION INTRAMUSCULAR; INTRAVENOUS; SUBCUTANEOUS
Status: DISCONTINUED | OUTPATIENT
Start: 2022-01-01 | End: 2022-01-01 | Stop reason: HOSPADM

## 2022-01-01 RX ORDER — FUROSEMIDE 10 MG/ML
INJECTION INTRAMUSCULAR; INTRAVENOUS
Status: DISCONTINUED | OUTPATIENT
Start: 2022-01-01 | End: 2022-01-01

## 2022-01-01 RX ORDER — FUROSEMIDE 40 MG/1
40 TABLET ORAL 2 TIMES DAILY
Qty: 60 TABLET | Refills: 2
Start: 2022-01-01 | End: 2022-01-01 | Stop reason: SDUPTHER

## 2022-01-01 RX ORDER — ALLOPURINOL 100 MG/1
200 TABLET ORAL DAILY
Qty: 30 TABLET | Refills: 0 | Status: ON HOLD | OUTPATIENT
Start: 2022-01-01 | End: 2022-01-01 | Stop reason: SDUPTHER

## 2022-01-01 RX ORDER — FUROSEMIDE 10 MG/ML
INJECTION INTRAMUSCULAR; INTRAVENOUS
Status: COMPLETED
Start: 2022-01-01 | End: 2022-01-01

## 2022-01-01 RX ORDER — OXYCODONE HYDROCHLORIDE 5 MG/1
5 TABLET ORAL ONCE
Status: COMPLETED | OUTPATIENT
Start: 2022-01-01 | End: 2022-01-01

## 2022-01-01 RX ORDER — OXYCODONE AND ACETAMINOPHEN 5; 325 MG/1; MG/1
1 TABLET ORAL EVERY 6 HOURS PRN
Qty: 28 TABLET | Refills: 0 | Status: CANCELLED | OUTPATIENT
Start: 2022-01-01

## 2022-01-01 RX ORDER — SIMETHICONE 80 MG
80 TABLET,CHEWABLE ORAL EVERY 6 HOURS PRN
Status: DISCONTINUED | OUTPATIENT
Start: 2022-01-01 | End: 2022-01-01 | Stop reason: HOSPADM

## 2022-01-01 RX ORDER — MUPIROCIN 20 MG/G
OINTMENT TOPICAL 2 TIMES DAILY
Qty: 22 G | Refills: 0 | Status: SHIPPED | OUTPATIENT
Start: 2022-01-01 | End: 2022-01-01

## 2022-01-01 RX ORDER — PROMETHAZINE HYDROCHLORIDE 25 MG/1
25 TABLET ORAL EVERY 6 HOURS PRN
Qty: 60 TABLET | Refills: 5 | Status: SHIPPED | OUTPATIENT
Start: 2022-01-01

## 2022-01-01 RX ORDER — IBUPROFEN 200 MG
24 TABLET ORAL
Status: DISCONTINUED | OUTPATIENT
Start: 2022-01-01 | End: 2022-01-01 | Stop reason: HOSPADM

## 2022-01-01 RX ORDER — SIMETHICONE 80 MG
1 TABLET,CHEWABLE ORAL 4 TIMES DAILY PRN
Status: DISCONTINUED | OUTPATIENT
Start: 2022-01-01 | End: 2022-01-01 | Stop reason: HOSPADM

## 2022-01-01 RX ORDER — LEVOFLOXACIN 5 MG/ML
750 INJECTION, SOLUTION INTRAVENOUS
Status: DISCONTINUED | OUTPATIENT
Start: 2022-01-01 | End: 2022-01-01

## 2022-01-01 RX ORDER — LIDOCAINE HYDROCHLORIDE 10 MG/ML
INJECTION INFILTRATION; PERINEURAL
Status: COMPLETED | OUTPATIENT
Start: 2022-01-01 | End: 2022-01-01

## 2022-01-01 RX ORDER — ATORVASTATIN CALCIUM 40 MG/1
80 TABLET, FILM COATED ORAL DAILY
Status: DISCONTINUED | OUTPATIENT
Start: 2022-01-01 | End: 2022-01-01

## 2022-01-01 RX ORDER — DIPHENHYDRAMINE HCL 25 MG
25 CAPSULE ORAL
Status: ON HOLD | COMMUNITY
End: 2022-01-01 | Stop reason: HOSPADM

## 2022-01-01 RX ORDER — HYDROCODONE BITARTRATE AND ACETAMINOPHEN 10; 325 MG/1; MG/1
1 TABLET ORAL EVERY 6 HOURS
Status: ON HOLD | COMMUNITY
Start: 2022-01-01 | End: 2022-01-01 | Stop reason: HOSPADM

## 2022-01-01 RX ORDER — DOBUTAMINE HYDROCHLORIDE 200 MG/100ML
2.5 INJECTION INTRAVENOUS CONTINUOUS
Qty: 250 ML | Refills: 5
Start: 2022-01-01 | End: 2022-01-01 | Stop reason: SDUPTHER

## 2022-01-01 RX ORDER — DOBUTAMINE HYDROCHLORIDE 200 MG/100ML
INJECTION INTRAVENOUS
Qty: 250 ML | Refills: 5 | Status: ON HOLD
Start: 2022-01-01 | End: 2022-01-01 | Stop reason: SDUPTHER

## 2022-01-01 RX ORDER — CEFPODOXIME PROXETIL 200 MG/1
200 TABLET, FILM COATED ORAL EVERY 12 HOURS
Qty: 8 TABLET | Refills: 0 | Status: ON HOLD | OUTPATIENT
Start: 2022-01-01 | End: 2022-01-01 | Stop reason: HOSPADM

## 2022-01-01 RX ORDER — BUMETANIDE 1 MG/1
3 TABLET ORAL ONCE
Status: COMPLETED | OUTPATIENT
Start: 2022-01-01 | End: 2022-01-01

## 2022-01-01 RX ORDER — POTASSIUM CHLORIDE 20 MEQ/1
20 TABLET, EXTENDED RELEASE ORAL DAILY
Qty: 30 TABLET | Refills: 3 | Status: SHIPPED | OUTPATIENT
Start: 2022-01-01

## 2022-01-01 RX ORDER — ROCURONIUM BROMIDE 10 MG/ML
INJECTION, SOLUTION INTRAVENOUS
Status: DISCONTINUED | OUTPATIENT
Start: 2022-01-01 | End: 2022-01-01

## 2022-01-01 RX ORDER — ASPIRIN 81 MG/1
81 TABLET ORAL
COMMUNITY
End: 2022-01-01

## 2022-01-01 RX ORDER — MORPHINE SULFATE 4 MG/ML
4 INJECTION, SOLUTION INTRAMUSCULAR; INTRAVENOUS
Status: COMPLETED | OUTPATIENT
Start: 2022-01-01 | End: 2022-01-01

## 2022-01-01 RX ORDER — HYDROMORPHONE HYDROCHLORIDE 1 MG/ML
0.2 INJECTION, SOLUTION INTRAMUSCULAR; INTRAVENOUS; SUBCUTANEOUS EVERY 5 MIN PRN
Status: DISCONTINUED | OUTPATIENT
Start: 2022-01-01 | End: 2022-01-01 | Stop reason: HOSPADM

## 2022-01-01 RX ORDER — EMPAGLIFLOZIN 25 MG/1
25 TABLET, FILM COATED ORAL DAILY
COMMUNITY
Start: 2022-01-01 | End: 2022-01-01 | Stop reason: SDUPTHER

## 2022-01-01 RX ORDER — MIDODRINE HYDROCHLORIDE 5 MG/1
5 TABLET ORAL
COMMUNITY
Start: 2022-01-01 | End: 2022-01-01

## 2022-01-01 RX ORDER — MICONAZOLE NITRATE 2 %
POWDER (GRAM) TOPICAL 2 TIMES DAILY
Status: DISCONTINUED | OUTPATIENT
Start: 2022-01-01 | End: 2022-01-01 | Stop reason: HOSPADM

## 2022-01-01 RX ORDER — POLYETHYLENE GLYCOL 3350 17 G/17G
17 POWDER, FOR SOLUTION ORAL DAILY
Status: DISCONTINUED | OUTPATIENT
Start: 2022-01-01 | End: 2022-01-01 | Stop reason: HOSPADM

## 2022-01-01 RX ORDER — ATORVASTATIN CALCIUM 20 MG/1
40 TABLET, FILM COATED ORAL NIGHTLY
Status: DISCONTINUED | OUTPATIENT
Start: 2022-01-01 | End: 2022-01-01 | Stop reason: HOSPADM

## 2022-01-01 RX ORDER — POTASSIUM CHLORIDE 20 MEQ/1
40 TABLET, EXTENDED RELEASE ORAL ONCE
Status: DISCONTINUED | OUTPATIENT
Start: 2022-01-01 | End: 2022-01-01 | Stop reason: HOSPADM

## 2022-01-01 RX ORDER — FUROSEMIDE 80 MG/1
80 TABLET ORAL
COMMUNITY
Start: 2022-01-01 | End: 2022-01-01

## 2022-01-01 RX ORDER — CEFPODOXIME PROXETIL 200 MG/1
200 TABLET, FILM COATED ORAL EVERY 12 HOURS
Status: DISCONTINUED | OUTPATIENT
Start: 2022-01-01 | End: 2022-01-01 | Stop reason: HOSPADM

## 2022-01-01 RX ORDER — ONDANSETRON 2 MG/ML
4 INJECTION INTRAMUSCULAR; INTRAVENOUS EVERY 8 HOURS PRN
Status: DISCONTINUED | OUTPATIENT
Start: 2022-01-01 | End: 2022-01-01 | Stop reason: HOSPADM

## 2022-01-01 RX ORDER — FAMOTIDINE 20 MG/1
20 TABLET, FILM COATED ORAL DAILY
Status: DISCONTINUED | OUTPATIENT
Start: 2022-01-01 | End: 2022-01-01

## 2022-01-01 RX ORDER — PROPOFOL 10 MG/ML
VIAL (ML) INTRAVENOUS
Status: DISPENSED
Start: 2022-01-01 | End: 2022-01-01

## 2022-01-01 RX ORDER — SACUBITRIL AND VALSARTAN 24; 26 MG/1; MG/1
1 TABLET, FILM COATED ORAL 2 TIMES DAILY
Qty: 60 TABLET | Refills: 2 | Status: ON HOLD | OUTPATIENT
Start: 2022-01-01 | End: 2022-01-01 | Stop reason: HOSPADM

## 2022-01-01 RX ORDER — MIDAZOLAM HYDROCHLORIDE 1 MG/ML
INJECTION INTRAMUSCULAR; INTRAVENOUS
Status: DISCONTINUED | OUTPATIENT
Start: 2022-01-01 | End: 2022-01-01

## 2022-01-01 RX ORDER — EMPAGLIFLOZIN 10 MG/1
10 TABLET, FILM COATED ORAL DAILY
Qty: 30 TABLET | Refills: 6 | OUTPATIENT
Start: 2022-01-01

## 2022-01-01 RX ORDER — HALOPERIDOL 5 MG/ML
INJECTION INTRAMUSCULAR
Status: COMPLETED
Start: 2022-01-01 | End: 2022-01-01

## 2022-01-01 RX ADMIN — HEPARIN SODIUM 18 UNITS/KG/HR: 10000 INJECTION, SOLUTION INTRAVENOUS at 05:10

## 2022-01-01 RX ADMIN — SPIRONOLACTONE 25 MG: 25 TABLET, FILM COATED ORAL at 09:09

## 2022-01-01 RX ADMIN — ASPIRIN 81 MG: 81 TABLET, COATED ORAL at 08:10

## 2022-01-01 RX ADMIN — SACUBITRIL AND VALSARTAN 1 TABLET: 24; 26 TABLET, FILM COATED ORAL at 08:04

## 2022-01-01 RX ADMIN — ATORVASTATIN CALCIUM 80 MG: 40 TABLET, FILM COATED ORAL at 09:10

## 2022-01-01 RX ADMIN — FUROSEMIDE 40 MG: 40 TABLET ORAL at 10:03

## 2022-01-01 RX ADMIN — GUAIFENESIN AND DEXTROMETHORPHAN HYDROBROMIDE 1 TABLET: 600; 30 TABLET, EXTENDED RELEASE ORAL at 09:09

## 2022-01-01 RX ADMIN — ETOMIDATE 2 MG: 2 INJECTION INTRAVENOUS at 10:10

## 2022-01-01 RX ADMIN — VANCOMYCIN HYDROCHLORIDE 1250 MG: 1.25 INJECTION, POWDER, LYOPHILIZED, FOR SOLUTION INTRAVENOUS at 02:09

## 2022-01-01 RX ADMIN — MUPIROCIN: 20 OINTMENT TOPICAL at 08:06

## 2022-01-01 RX ADMIN — HYDROCODONE BITARTRATE AND ACETAMINOPHEN 1 TABLET: 10; 325 TABLET ORAL at 10:10

## 2022-01-01 RX ADMIN — METHOCARBAMOL 500 MG: 500 TABLET ORAL at 08:09

## 2022-01-01 RX ADMIN — ALLOPURINOL 200 MG: 100 TABLET ORAL at 09:10

## 2022-01-01 RX ADMIN — ALLOPURINOL 200 MG: 100 TABLET ORAL at 09:09

## 2022-01-01 RX ADMIN — PIPERACILLIN SODIUM AND TAZOBACTAM SODIUM 4.5 G: 4; .5 INJECTION, POWDER, LYOPHILIZED, FOR SOLUTION INTRAVENOUS at 04:10

## 2022-01-01 RX ADMIN — HYDROCODONE BITARTRATE AND ACETAMINOPHEN 1 TABLET: 10; 325 TABLET ORAL at 01:01

## 2022-01-01 RX ADMIN — FAMOTIDINE 20 MG: 20 TABLET ORAL at 08:10

## 2022-01-01 RX ADMIN — MUPIROCIN: 20 OINTMENT TOPICAL at 08:04

## 2022-01-01 RX ADMIN — ASPIRIN 81 MG: 81 TABLET, COATED ORAL at 09:10

## 2022-01-01 RX ADMIN — FAMOTIDINE 20 MG: 10 INJECTION INTRAVENOUS at 09:10

## 2022-01-01 RX ADMIN — VANCOMYCIN HYDROCHLORIDE 500 MG: 500 INJECTION, POWDER, LYOPHILIZED, FOR SOLUTION INTRAVENOUS at 03:09

## 2022-01-01 RX ADMIN — ASPIRIN 81 MG: 81 TABLET, COATED ORAL at 08:09

## 2022-01-01 RX ADMIN — HYDROCODONE BITARTRATE AND ACETAMINOPHEN 1 TABLET: 10; 325 TABLET ORAL at 07:09

## 2022-01-01 RX ADMIN — ALLOPURINOL 200 MG: 100 TABLET ORAL at 08:03

## 2022-01-01 RX ADMIN — ETOMIDATE 2 MG: 2 INJECTION, SOLUTION INTRAVENOUS at 10:08

## 2022-01-01 RX ADMIN — HYDROCODONE BITARTRATE AND ACETAMINOPHEN 1 TABLET: 10; 325 TABLET ORAL at 04:09

## 2022-01-01 RX ADMIN — DOBUTAMINE HYDROCHLORIDE 3.75 MCG/KG/MIN: 400 INJECTION INTRAVENOUS at 02:10

## 2022-01-01 RX ADMIN — ONDANSETRON 4 MG: 2 INJECTION INTRAMUSCULAR; INTRAVENOUS at 09:10

## 2022-01-01 RX ADMIN — ACETAMINOPHEN 650 MG: 325 TABLET ORAL at 06:03

## 2022-01-01 RX ADMIN — PIPERACILLIN SODIUM AND TAZOBACTAM SODIUM 4.5 G: 4; .5 INJECTION, POWDER, LYOPHILIZED, FOR SOLUTION INTRAVENOUS at 12:10

## 2022-01-01 RX ADMIN — OLOPATADINE HYDROCHLORIDE 1 DROP: 2 SOLUTION OPHTHALMIC at 09:10

## 2022-01-01 RX ADMIN — ACETAMINOPHEN 650 MG: 325 TABLET ORAL at 09:09

## 2022-01-01 RX ADMIN — SODIUM CHLORIDE: 0.9 INJECTION, SOLUTION INTRAVENOUS at 12:10

## 2022-01-01 RX ADMIN — PREDNISONE 30 MG: 5 TABLET ORAL at 10:10

## 2022-01-01 RX ADMIN — ACETAMINOPHEN 650 MG: 325 TABLET ORAL at 02:10

## 2022-01-01 RX ADMIN — ONDANSETRON HYDROCHLORIDE 4 MG: 4 TABLET, FILM COATED ORAL at 04:10

## 2022-01-01 RX ADMIN — Medication 6 MG: at 10:10

## 2022-01-01 RX ADMIN — SUCRALFATE 1 G: 1 SUSPENSION ORAL at 09:10

## 2022-01-01 RX ADMIN — ATORVASTATIN CALCIUM 80 MG: 40 TABLET, FILM COATED ORAL at 09:09

## 2022-01-01 RX ADMIN — HEPARIN SODIUM 5000 UNITS: 5000 INJECTION INTRAVENOUS; SUBCUTANEOUS at 02:03

## 2022-01-01 RX ADMIN — ATORVASTATIN CALCIUM 80 MG: 40 TABLET, FILM COATED ORAL at 08:10

## 2022-01-01 RX ADMIN — DEXMEDETOMIDINE HYDROCHLORIDE 0.3 MCG/KG/HR: 4 INJECTION INTRAVENOUS at 11:09

## 2022-01-01 RX ADMIN — PIPERACILLIN SODIUM AND TAZOBACTAM SODIUM 4.5 G: 4; .5 INJECTION, POWDER, LYOPHILIZED, FOR SOLUTION INTRAVENOUS at 08:10

## 2022-01-01 RX ADMIN — MICONAZOLE NITRATE: 20 POWDER TOPICAL at 08:04

## 2022-01-01 RX ADMIN — DEXMEDETOMIDINE HYDROCHLORIDE 0.2 MCG/KG/HR: 4 INJECTION INTRAVENOUS at 06:09

## 2022-01-01 RX ADMIN — HYPROMELLOSE 2910 1 DROP: 5 SOLUTION OPHTHALMIC at 10:10

## 2022-01-01 RX ADMIN — DOBUTAMINE HYDROCHLORIDE 2.5 MCG/KG/MIN: 400 INJECTION INTRAVENOUS at 02:10

## 2022-01-01 RX ADMIN — HEPARIN SODIUM 19 UNITS/KG/HR: 10000 INJECTION, SOLUTION INTRAVENOUS at 06:10

## 2022-01-01 RX ADMIN — POLYETHYLENE GLYCOL 3350 17 G: 17 POWDER, FOR SOLUTION ORAL at 08:10

## 2022-01-01 RX ADMIN — ASPIRIN 81 MG: 81 TABLET, COATED ORAL at 10:10

## 2022-01-01 RX ADMIN — SACUBITRIL AND VALSARTAN 1 TABLET: 24; 26 TABLET, FILM COATED ORAL at 08:09

## 2022-01-01 RX ADMIN — VANCOMYCIN HYDROCHLORIDE 1000 MG: 1 INJECTION, POWDER, LYOPHILIZED, FOR SOLUTION INTRAVENOUS at 09:09

## 2022-01-01 RX ADMIN — Medication 0.02 MCG/KG/MIN: at 10:10

## 2022-01-01 RX ADMIN — MIDODRINE HYDROCHLORIDE 2.5 MG: 2.5 TABLET ORAL at 02:03

## 2022-01-01 RX ADMIN — ACETAMINOPHEN 650 MG: 325 TABLET ORAL at 09:10

## 2022-01-01 RX ADMIN — LIDOCAINE HYDROCHLORIDE 50 MG: 10 INJECTION, SOLUTION INTRAVENOUS at 10:08

## 2022-01-01 RX ADMIN — ASPIRIN 81 MG: 81 TABLET, COATED ORAL at 08:06

## 2022-01-01 RX ADMIN — FUROSEMIDE 80 MG: 10 INJECTION, SOLUTION INTRAMUSCULAR; INTRAVENOUS at 10:09

## 2022-01-01 RX ADMIN — ACETAMINOPHEN 650 MG: 325 TABLET ORAL at 08:10

## 2022-01-01 RX ADMIN — PIPERACILLIN SODIUM AND TAZOBACTAM SODIUM 4.5 G: 4; .5 INJECTION, POWDER, LYOPHILIZED, FOR SOLUTION INTRAVENOUS at 02:10

## 2022-01-01 RX ADMIN — SACUBITRIL AND VALSARTAN 1 TABLET: 24; 26 TABLET, FILM COATED ORAL at 09:09

## 2022-01-01 RX ADMIN — DOBUTAMINE HYDROCHLORIDE 3.75 MCG/KG/MIN: 400 INJECTION INTRAVENOUS at 11:10

## 2022-01-01 RX ADMIN — OLOPATADINE HYDROCHLORIDE 1 DROP: 2 SOLUTION OPHTHALMIC at 10:10

## 2022-01-01 RX ADMIN — VASOPRESSIN 0.04 UNITS/MIN: 20 INJECTION INTRAVENOUS at 02:10

## 2022-01-01 RX ADMIN — MICONAZOLE NITRATE: 20 OINTMENT TOPICAL at 09:03

## 2022-01-01 RX ADMIN — HYDROCODONE BITARTRATE AND ACETAMINOPHEN 1 TABLET: 10; 325 TABLET ORAL at 06:09

## 2022-01-01 RX ADMIN — HYPROMELLOSE 2910 1 DROP: 5 SOLUTION OPHTHALMIC at 06:10

## 2022-01-01 RX ADMIN — ETOMIDATE 4 MG: 2 INJECTION INTRAVENOUS at 04:09

## 2022-01-01 RX ADMIN — ALTEPLASE 2 MG: 2.2 INJECTION, POWDER, LYOPHILIZED, FOR SOLUTION INTRAVENOUS at 12:09

## 2022-01-01 RX ADMIN — ALLOPURINOL 200 MG: 100 TABLET ORAL at 08:10

## 2022-01-01 RX ADMIN — CEFTRIAXONE 2 G: 2 INJECTION, SOLUTION INTRAVENOUS at 01:03

## 2022-01-01 RX ADMIN — MICONAZOLE NITRATE: 20 OINTMENT TOPICAL at 02:03

## 2022-01-01 RX ADMIN — DOBUTAMINE HYDROCHLORIDE 2.5 MCG/KG/MIN: 400 INJECTION INTRAVENOUS at 04:04

## 2022-01-01 RX ADMIN — HYDROCODONE BITARTRATE AND ACETAMINOPHEN 1 TABLET: 10; 325 TABLET ORAL at 12:10

## 2022-01-01 RX ADMIN — AMIODARONE HYDROCHLORIDE 300 MG: 200 TABLET ORAL at 09:09

## 2022-01-01 RX ADMIN — HEPARIN SODIUM 5000 UNITS: 5000 INJECTION INTRAVENOUS; SUBCUTANEOUS at 01:03

## 2022-01-01 RX ADMIN — POTASSIUM CHLORIDE 40 MEQ: 29.8 INJECTION, SOLUTION INTRAVENOUS at 09:10

## 2022-01-01 RX ADMIN — FAMOTIDINE 20 MG: 10 INJECTION INTRAVENOUS at 08:10

## 2022-01-01 RX ADMIN — HUMAN ALBUMIN MICROSPHERES AND PERFLUTREN 0.66 MG: 10; .22 INJECTION, SOLUTION INTRAVENOUS at 11:05

## 2022-01-01 RX ADMIN — ASPIRIN 81 MG: 81 TABLET, COATED ORAL at 03:09

## 2022-01-01 RX ADMIN — CEFPODOXIME PROXETIL 200 MG: 200 TABLET, FILM COATED ORAL at 08:04

## 2022-01-01 RX ADMIN — ROCURONIUM BROMIDE 30 MG: 10 INJECTION INTRAVENOUS at 08:09

## 2022-01-01 RX ADMIN — SODIUM CHLORIDE, SODIUM GLUCONATE, SODIUM ACETATE, POTASSIUM CHLORIDE, MAGNESIUM CHLORIDE, SODIUM PHOSPHATE, DIBASIC, AND POTASSIUM PHOSPHATE: .53; .5; .37; .037; .03; .012; .00082 INJECTION, SOLUTION INTRAVENOUS at 07:09

## 2022-01-01 RX ADMIN — FUROSEMIDE 80 MG: 10 INJECTION, SOLUTION INTRAMUSCULAR; INTRAVENOUS at 01:10

## 2022-01-01 RX ADMIN — VANCOMYCIN HYDROCHLORIDE 2000 MG: 500 INJECTION, POWDER, LYOPHILIZED, FOR SOLUTION INTRAVENOUS at 01:09

## 2022-01-01 RX ADMIN — MAGNESIUM SULFATE 2 G: 2 INJECTION INTRAVENOUS at 03:10

## 2022-01-01 RX ADMIN — ASPIRIN 81 MG: 81 TABLET, COATED ORAL at 09:09

## 2022-01-01 RX ADMIN — VANCOMYCIN HYDROCHLORIDE 1250 MG: 1.25 INJECTION, POWDER, LYOPHILIZED, FOR SOLUTION INTRAVENOUS at 05:09

## 2022-01-01 RX ADMIN — ACETAMINOPHEN 650 MG: 325 TABLET ORAL at 01:10

## 2022-01-01 RX ADMIN — FENTANYL CITRATE 25 MCG: 50 INJECTION, SOLUTION INTRAMUSCULAR; INTRAVENOUS at 05:10

## 2022-01-01 RX ADMIN — Medication 10 ML: at 12:09

## 2022-01-01 RX ADMIN — DOBUTAMINE HYDROCHLORIDE 2.5 MCG/KG/MIN: 400 INJECTION INTRAVENOUS at 09:10

## 2022-01-01 RX ADMIN — DAPTOMYCIN 945 MG: 350 INJECTION, POWDER, LYOPHILIZED, FOR SOLUTION INTRAVENOUS at 08:10

## 2022-01-01 RX ADMIN — FUROSEMIDE 80 MG: 10 INJECTION, SOLUTION INTRAMUSCULAR; INTRAVENOUS at 03:10

## 2022-01-01 RX ADMIN — MUPIROCIN: 20 OINTMENT TOPICAL at 09:09

## 2022-01-01 RX ADMIN — DOBUTAMINE HYDROCHLORIDE 2.5 MCG/KG/MIN: 400 INJECTION INTRAVENOUS at 05:10

## 2022-01-01 RX ADMIN — FAMOTIDINE 20 MG: 10 INJECTION INTRAVENOUS at 03:09

## 2022-01-01 RX ADMIN — MICONAZOLE NITRATE: 20 POWDER TOPICAL at 08:03

## 2022-01-01 RX ADMIN — ENOXAPARIN SODIUM 40 MG: 100 INJECTION SUBCUTANEOUS at 05:09

## 2022-01-01 RX ADMIN — DOBUTAMINE HYDROCHLORIDE 5 MCG/KG/MIN: 400 INJECTION INTRAVENOUS at 09:09

## 2022-01-01 RX ADMIN — DOBUTAMINE HYDROCHLORIDE 2.5 MCG/KG/MIN: 400 INJECTION INTRAVENOUS at 06:10

## 2022-01-01 RX ADMIN — SENNOSIDES AND DOCUSATE SODIUM 1 TABLET: 50; 8.6 TABLET ORAL at 03:10

## 2022-01-01 RX ADMIN — MICONAZOLE NITRATE: 20 POWDER TOPICAL at 11:03

## 2022-01-01 RX ADMIN — PREDNISONE 30 MG: 5 TABLET ORAL at 09:10

## 2022-01-01 RX ADMIN — ONDANSETRON 4 MG: 2 INJECTION INTRAMUSCULAR; INTRAVENOUS at 12:10

## 2022-01-01 RX ADMIN — VANCOMYCIN HYDROCHLORIDE 125 MG: KIT at 05:04

## 2022-01-01 RX ADMIN — ONDANSETRON 4 MG: 2 INJECTION INTRAMUSCULAR; INTRAVENOUS at 08:09

## 2022-01-01 RX ADMIN — DOBUTAMINE HYDROCHLORIDE 3.75 MCG/KG/MIN: 400 INJECTION INTRAVENOUS at 07:10

## 2022-01-01 RX ADMIN — AMIODARONE HYDROCHLORIDE 300 MG: 200 TABLET ORAL at 08:10

## 2022-01-01 RX ADMIN — IOHEXOL 100 ML: 350 INJECTION, SOLUTION INTRAVENOUS at 09:03

## 2022-01-01 RX ADMIN — SODIUM CHLORIDE: 9 INJECTION, SOLUTION INTRAVENOUS at 10:08

## 2022-01-01 RX ADMIN — ACETAMINOPHEN 650 MG: 325 TABLET ORAL at 05:09

## 2022-01-01 RX ADMIN — FUROSEMIDE 80 MG: 10 INJECTION, SOLUTION INTRAMUSCULAR; INTRAVENOUS at 08:10

## 2022-01-01 RX ADMIN — HEPARIN SODIUM 19 UNITS/KG/HR: 10000 INJECTION, SOLUTION INTRAVENOUS at 01:10

## 2022-01-01 RX ADMIN — HEPARIN SODIUM 5000 UNITS: 5000 INJECTION INTRAVENOUS; SUBCUTANEOUS at 09:03

## 2022-01-01 RX ADMIN — ATORVASTATIN CALCIUM 80 MG: 20 TABLET, FILM COATED ORAL at 08:04

## 2022-01-01 RX ADMIN — DAPTOMYCIN 945 MG: 350 INJECTION, POWDER, LYOPHILIZED, FOR SOLUTION INTRAVENOUS at 10:10

## 2022-01-01 RX ADMIN — PROPOFOL 25 MCG/KG/MIN: 10 INJECTION, EMULSION INTRAVENOUS at 06:09

## 2022-01-01 RX ADMIN — ONDANSETRON 4 MG: 2 INJECTION INTRAMUSCULAR; INTRAVENOUS at 12:09

## 2022-01-01 RX ADMIN — VANCOMYCIN HYDROCHLORIDE 125 MG: KIT at 12:04

## 2022-01-01 RX ADMIN — HEPARIN SODIUM 18 UNITS/KG/HR: 10000 INJECTION, SOLUTION INTRAVENOUS at 05:09

## 2022-01-01 RX ADMIN — MICONAZOLE NITRATE: 20 POWDER TOPICAL at 09:03

## 2022-01-01 RX ADMIN — DAPTOMYCIN 945 MG: 350 INJECTION, POWDER, LYOPHILIZED, FOR SOLUTION INTRAVENOUS at 09:10

## 2022-01-01 RX ADMIN — HEPARIN SODIUM 18 UNITS/KG/HR: 10000 INJECTION, SOLUTION INTRAVENOUS at 11:10

## 2022-01-01 RX ADMIN — PIPERACILLIN SODIUM AND TAZOBACTAM SODIUM 4.5 G: 4; .5 INJECTION, POWDER, LYOPHILIZED, FOR SOLUTION INTRAVENOUS at 05:10

## 2022-01-01 RX ADMIN — PROPOFOL 20 MCG/KG/MIN: 10 INJECTION, EMULSION INTRAVENOUS at 09:09

## 2022-01-01 RX ADMIN — SPIRONOLACTONE 25 MG: 25 TABLET, FILM COATED ORAL at 08:09

## 2022-01-01 RX ADMIN — SACUBITRIL AND VALSARTAN 1 TABLET: 24; 26 TABLET, FILM COATED ORAL at 10:09

## 2022-01-01 RX ADMIN — SODIUM CHLORIDE: 0.9 INJECTION, SOLUTION INTRAVENOUS at 02:09

## 2022-01-01 RX ADMIN — HEPARIN SODIUM 19 UNITS/KG/HR: 10000 INJECTION, SOLUTION INTRAVENOUS at 09:10

## 2022-01-01 RX ADMIN — FAMOTIDINE 20 MG: 20 TABLET ORAL at 09:10

## 2022-01-01 RX ADMIN — ACETAMINOPHEN 650 MG: 325 TABLET ORAL at 12:09

## 2022-01-01 RX ADMIN — MUPIROCIN: 20 OINTMENT TOPICAL at 09:03

## 2022-01-01 RX ADMIN — ACETAMINOPHEN 650 MG: 325 TABLET ORAL at 08:09

## 2022-01-01 RX ADMIN — FUROSEMIDE 40 MG: 10 INJECTION INTRAMUSCULAR; INTRAVENOUS at 12:09

## 2022-01-01 RX ADMIN — FUROSEMIDE 40 MG: 10 INJECTION, SOLUTION INTRAMUSCULAR; INTRAVENOUS at 12:09

## 2022-01-01 RX ADMIN — HYDROCODONE BITARTRATE AND ACETAMINOPHEN 1 TABLET: 10; 325 TABLET ORAL at 06:10

## 2022-01-01 RX ADMIN — AZITHROMYCIN MONOHYDRATE 500 MG: 250 TABLET ORAL at 01:10

## 2022-01-01 RX ADMIN — FUROSEMIDE 80 MG: 10 INJECTION, SOLUTION INTRAMUSCULAR; INTRAVENOUS at 02:10

## 2022-01-01 RX ADMIN — MUPIROCIN: 20 OINTMENT TOPICAL at 08:09

## 2022-01-01 RX ADMIN — ALTEPLASE 2 MG: 2.2 INJECTION, POWDER, LYOPHILIZED, FOR SOLUTION INTRAVENOUS at 12:10

## 2022-01-01 RX ADMIN — LEVOFLOXACIN 750 MG: 750 INJECTION, SOLUTION INTRAVENOUS at 07:03

## 2022-01-01 RX ADMIN — DOBUTAMINE HYDROCHLORIDE 2.5 MCG/KG/MIN: 400 INJECTION INTRAVENOUS at 09:04

## 2022-01-01 RX ADMIN — SODIUM CHLORIDE 250 ML: 0.9 INJECTION, SOLUTION INTRAVENOUS at 06:05

## 2022-01-01 RX ADMIN — HYDROCODONE BITARTRATE AND ACETAMINOPHEN 1 TABLET: 10; 325 TABLET ORAL at 01:10

## 2022-01-01 RX ADMIN — ATORVASTATIN CALCIUM 80 MG: 40 TABLET, FILM COATED ORAL at 03:09

## 2022-01-01 RX ADMIN — COLCHICINE 0.6 MG: 0.6 TABLET, FILM COATED ORAL at 09:10

## 2022-01-01 RX ADMIN — Medication 6 MG: at 09:03

## 2022-01-01 RX ADMIN — POTASSIUM CHLORIDE 20 MEQ: 1500 TABLET, EXTENDED RELEASE ORAL at 05:10

## 2022-01-01 RX ADMIN — PREDNISONE 40 MG: 20 TABLET ORAL at 08:04

## 2022-01-01 RX ADMIN — HYDROMORPHONE HYDROCHLORIDE 0.2 MG: 1 INJECTION, SOLUTION INTRAMUSCULAR; INTRAVENOUS; SUBCUTANEOUS at 02:09

## 2022-01-01 RX ADMIN — PROCHLORPERAZINE EDISYLATE 2.5 MG: 5 INJECTION INTRAMUSCULAR; INTRAVENOUS at 09:03

## 2022-01-01 RX ADMIN — OXYCODONE HYDROCHLORIDE AND ACETAMINOPHEN 1 TABLET: 5; 325 TABLET ORAL at 08:04

## 2022-01-01 RX ADMIN — DOBUTAMINE HYDROCHLORIDE 2.5 MCG/KG/MIN: 400 INJECTION INTRAVENOUS at 04:10

## 2022-01-01 RX ADMIN — MIDAZOLAM HYDROCHLORIDE 2 MG: 1 INJECTION, SOLUTION INTRAMUSCULAR; INTRAVENOUS at 07:09

## 2022-01-01 RX ADMIN — ONDANSETRON 4 MG: 2 INJECTION INTRAMUSCULAR; INTRAVENOUS at 04:09

## 2022-01-01 RX ADMIN — POLYETHYLENE GLYCOL 3350 17 G: 17 POWDER, FOR SOLUTION ORAL at 10:10

## 2022-01-01 RX ADMIN — EPINEPHRINE 10 MCG: 0.1 INJECTION, SOLUTION ENDOTRACHEAL; INTRACARDIAC; INTRAVENOUS at 10:08

## 2022-01-01 RX ADMIN — OLOPATADINE HYDROCHLORIDE 1 DROP: 2 SOLUTION OPHTHALMIC at 08:10

## 2022-01-01 RX ADMIN — FAMOTIDINE 20 MG: 20 TABLET ORAL at 10:10

## 2022-01-01 RX ADMIN — DEXMEDETOMIDINE HYDROCHLORIDE 8 MCG: 100 INJECTION, SOLUTION INTRAVENOUS at 03:10

## 2022-01-01 RX ADMIN — ALLOPURINOL 200 MG: 100 TABLET ORAL at 03:09

## 2022-01-01 RX ADMIN — HEPARIN SODIUM 19 UNITS/KG/HR: 10000 INJECTION, SOLUTION INTRAVENOUS at 02:10

## 2022-01-01 RX ADMIN — FUROSEMIDE 40 MG: 40 TABLET ORAL at 12:10

## 2022-01-01 RX ADMIN — VANCOMYCIN HYDROCHLORIDE 125 MG: KIT at 11:04

## 2022-01-01 RX ADMIN — LIDOCAINE 1 PATCH: 50 PATCH CUTANEOUS at 05:09

## 2022-01-01 RX ADMIN — MAGNESIUM SULFATE 2 G: 2 INJECTION INTRAVENOUS at 06:10

## 2022-01-01 RX ADMIN — DOBUTAMINE HYDROCHLORIDE 2.5 MCG/KG/MIN: 400 INJECTION INTRAVENOUS at 12:09

## 2022-01-01 RX ADMIN — OXYCODONE HYDROCHLORIDE AND ACETAMINOPHEN 1 TABLET: 5; 325 TABLET ORAL at 09:04

## 2022-01-01 RX ADMIN — PROPOFOL 25 MCG/KG/MIN: 10 INJECTION, EMULSION INTRAVENOUS at 12:09

## 2022-01-01 RX ADMIN — ASPIRIN 81 MG: 81 TABLET, COATED ORAL at 09:04

## 2022-01-01 RX ADMIN — AMIODARONE HYDROCHLORIDE 300 MG: 200 TABLET ORAL at 09:10

## 2022-01-01 RX ADMIN — SODIUM CHLORIDE: 0.9 INJECTION, SOLUTION INTRAVENOUS at 01:10

## 2022-01-01 RX ADMIN — ETOMIDATE 4 MG: 2 INJECTION, SOLUTION INTRAVENOUS at 10:08

## 2022-01-01 RX ADMIN — FUROSEMIDE 20 MG/HR: 10 INJECTION, SOLUTION INTRAMUSCULAR; INTRAVENOUS at 04:10

## 2022-01-01 RX ADMIN — HEPARIN SODIUM 19 UNITS/KG/HR: 10000 INJECTION, SOLUTION INTRAVENOUS at 05:10

## 2022-01-01 RX ADMIN — FUROSEMIDE 40 MG: 40 TABLET ORAL at 09:09

## 2022-01-01 RX ADMIN — ALLOPURINOL 200 MG: 100 TABLET ORAL at 10:09

## 2022-01-01 RX ADMIN — ACETAMINOPHEN 650 MG: 325 TABLET ORAL at 01:09

## 2022-01-01 RX ADMIN — SPIRONOLACTONE 25 MG: 25 TABLET, FILM COATED ORAL at 08:06

## 2022-01-01 RX ADMIN — ATORVASTATIN CALCIUM 80 MG: 40 TABLET, FILM COATED ORAL at 08:09

## 2022-01-01 RX ADMIN — ACETAMINOPHEN 650 MG: 325 TABLET ORAL at 10:10

## 2022-01-01 RX ADMIN — ASPIRIN 81 MG: 81 TABLET, COATED ORAL at 10:09

## 2022-01-01 RX ADMIN — VANCOMYCIN HYDROCHLORIDE 125 MG: KIT at 05:03

## 2022-01-01 RX ADMIN — HEPARIN SODIUM 5000 UNITS: 5000 INJECTION INTRAVENOUS; SUBCUTANEOUS at 07:03

## 2022-01-01 RX ADMIN — FUROSEMIDE 80 MG: 10 INJECTION, SOLUTION INTRAMUSCULAR; INTRAVENOUS at 11:10

## 2022-01-01 RX ADMIN — CEFTRIAXONE 2 G: 2 INJECTION, SOLUTION INTRAVENOUS at 02:03

## 2022-01-01 RX ADMIN — FUROSEMIDE 40 MG: 40 TABLET ORAL at 08:09

## 2022-01-01 RX ADMIN — FUROSEMIDE 80 MG: 10 INJECTION, SOLUTION INTRAMUSCULAR; INTRAVENOUS at 05:09

## 2022-01-01 RX ADMIN — SUCRALFATE 1 G: 1 SUSPENSION ORAL at 08:10

## 2022-01-01 RX ADMIN — Medication 10 ML: at 06:09

## 2022-01-01 RX ADMIN — Medication 0.6 MG: at 03:09

## 2022-01-01 RX ADMIN — HEPARIN SODIUM 5000 UNITS: 5000 INJECTION INTRAVENOUS; SUBCUTANEOUS at 05:03

## 2022-01-01 RX ADMIN — POTASSIUM CHLORIDE 20 MEQ: 1500 TABLET, EXTENDED RELEASE ORAL at 08:10

## 2022-01-01 RX ADMIN — HEPARIN SODIUM 19 UNITS/KG/HR: 10000 INJECTION, SOLUTION INTRAVENOUS at 12:10

## 2022-01-01 RX ADMIN — POLYETHYLENE GLYCOL 3350 17 G: 17 POWDER, FOR SOLUTION ORAL at 09:10

## 2022-01-01 RX ADMIN — ETOMIDATE 10 MG: 2 INJECTION INTRAVENOUS at 04:09

## 2022-01-01 RX ADMIN — DEXAMETHASONE SODIUM PHOSPHATE 4 MG: 4 INJECTION, SOLUTION INTRAMUSCULAR; INTRAVENOUS at 11:09

## 2022-01-01 RX ADMIN — SUCRALFATE 1 G: 1 SUSPENSION ORAL at 10:10

## 2022-01-01 RX ADMIN — ACETAMINOPHEN 650 MG: 325 TABLET ORAL at 10:09

## 2022-01-01 RX ADMIN — ACETAMINOPHEN 650 MG: 325 TABLET ORAL at 02:09

## 2022-01-01 RX ADMIN — PHENYLEPHRINE HYDROCHLORIDE 100 MCG: 10 INJECTION INTRAVENOUS at 12:09

## 2022-01-01 RX ADMIN — ALLOPURINOL 200 MG: 100 TABLET ORAL at 08:09

## 2022-01-01 RX ADMIN — SODIUM CHLORIDE 250 ML: 0.9 INJECTION, SOLUTION INTRAVENOUS at 05:05

## 2022-01-01 RX ADMIN — ALLOPURINOL 200 MG: 100 TABLET ORAL at 10:10

## 2022-01-01 RX ADMIN — HEPARIN SODIUM 19.95 UNITS/KG/HR: 10000 INJECTION, SOLUTION INTRAVENOUS at 02:09

## 2022-01-01 RX ADMIN — AMIODARONE HYDROCHLORIDE 300 MG: 200 TABLET ORAL at 10:10

## 2022-01-01 RX ADMIN — METOPROLOL SUCCINATE 50 MG: 50 TABLET, EXTENDED RELEASE ORAL at 08:03

## 2022-01-01 RX ADMIN — VANCOMYCIN HYDROCHLORIDE 125 MG: KIT at 12:03

## 2022-01-01 RX ADMIN — LIDOCAINE 1 PATCH: 50 PATCH CUTANEOUS at 04:09

## 2022-01-01 RX ADMIN — AMIODARONE HYDROCHLORIDE 300 MG: 100 TABLET ORAL at 08:03

## 2022-01-01 RX ADMIN — PHENYLEPHRINE HYDROCHLORIDE 100 MCG: 10 INJECTION INTRAVENOUS at 11:09

## 2022-01-01 RX ADMIN — OXYCODONE HYDROCHLORIDE AND ACETAMINOPHEN 1 TABLET: 5; 325 TABLET ORAL at 10:04

## 2022-01-01 RX ADMIN — LIDOCAINE 1 PATCH: 50 PATCH CUTANEOUS at 04:10

## 2022-01-01 RX ADMIN — Medication 10 ML: at 11:09

## 2022-01-01 RX ADMIN — SPIRONOLACTONE 25 MG: 25 TABLET, FILM COATED ORAL at 06:09

## 2022-01-01 RX ADMIN — DOBUTAMINE HYDROCHLORIDE 3.75 MCG/KG/MIN: 400 INJECTION INTRAVENOUS at 04:10

## 2022-01-01 RX ADMIN — FUROSEMIDE 40 MG: 40 TABLET ORAL at 05:10

## 2022-01-01 RX ADMIN — PIPERACILLIN SODIUM AND TAZOBACTAM SODIUM 4.5 G: 4; .5 INJECTION, POWDER, LYOPHILIZED, FOR SOLUTION INTRAVENOUS at 09:10

## 2022-01-01 RX ADMIN — SACUBITRIL AND VALSARTAN 1 TABLET: 24; 26 TABLET, FILM COATED ORAL at 09:04

## 2022-01-01 RX ADMIN — HYDROCODONE BITARTRATE AND ACETAMINOPHEN 1 TABLET: 10; 325 TABLET ORAL at 03:09

## 2022-01-01 RX ADMIN — METOPROLOL SUCCINATE 50 MG: 50 TABLET, EXTENDED RELEASE ORAL at 10:03

## 2022-01-01 RX ADMIN — SACUBITRIL AND VALSARTAN 1 TABLET: 49; 51 TABLET, FILM COATED ORAL at 10:03

## 2022-01-01 RX ADMIN — HEPARIN SODIUM 19 UNITS/KG/HR: 10000 INJECTION, SOLUTION INTRAVENOUS at 08:10

## 2022-01-01 RX ADMIN — DOBUTAMINE HYDROCHLORIDE 2.5 MCG/KG/MIN: 400 INJECTION INTRAVENOUS at 07:09

## 2022-01-01 RX ADMIN — AMIODARONE HYDROCHLORIDE 300 MG: 200 TABLET ORAL at 08:04

## 2022-01-01 RX ADMIN — PIPERACILLIN SODIUM AND TAZOBACTAM SODIUM 4.5 G: 4; .5 INJECTION, POWDER, LYOPHILIZED, FOR SOLUTION INTRAVENOUS at 02:09

## 2022-01-01 RX ADMIN — POLYETHYLENE GLYCOL 3350 17 G: 17 POWDER, FOR SOLUTION ORAL at 09:09

## 2022-01-01 RX ADMIN — ACETAMINOPHEN 650 MG: 325 TABLET ORAL at 05:10

## 2022-01-01 RX ADMIN — MAGNESIUM SULFATE 2 G: 2 INJECTION INTRAVENOUS at 01:10

## 2022-01-01 RX ADMIN — DOBUTAMINE HYDROCHLORIDE 2.5 MCG/KG/MIN: 400 INJECTION INTRAVENOUS at 07:10

## 2022-01-01 RX ADMIN — MIDODRINE HYDROCHLORIDE 2.5 MG: 2.5 TABLET ORAL at 08:03

## 2022-01-01 RX ADMIN — ONDANSETRON 4 MG: 2 INJECTION INTRAMUSCULAR; INTRAVENOUS at 04:10

## 2022-01-01 RX ADMIN — HYDROCODONE BITARTRATE AND ACETAMINOPHEN 1 TABLET: 10; 325 TABLET ORAL at 05:10

## 2022-01-01 RX ADMIN — HYDROCODONE BITARTRATE AND ACETAMINOPHEN 1 TABLET: 10; 325 TABLET ORAL at 04:10

## 2022-01-01 RX ADMIN — POTASSIUM CHLORIDE 20 MEQ: 1500 TABLET, EXTENDED RELEASE ORAL at 06:10

## 2022-01-01 RX ADMIN — PIPERACILLIN SODIUM AND TAZOBACTAM SODIUM 4.5 G: 4; .5 INJECTION, POWDER, LYOPHILIZED, FOR SOLUTION INTRAVENOUS at 01:10

## 2022-01-01 RX ADMIN — Medication 0.02 MCG/KG/MIN: at 02:09

## 2022-01-01 RX ADMIN — HEPARIN SODIUM 5000 UNITS: 5000 INJECTION INTRAVENOUS; SUBCUTANEOUS at 05:04

## 2022-01-01 RX ADMIN — FUROSEMIDE 20 MG/HR: 10 INJECTION, SOLUTION INTRAMUSCULAR; INTRAVENOUS at 05:10

## 2022-01-01 RX ADMIN — ACETAMINOPHEN 650 MG: 325 TABLET ORAL at 06:09

## 2022-01-01 RX ADMIN — ALTEPLASE 2 MG: 2.2 INJECTION, POWDER, LYOPHILIZED, FOR SOLUTION INTRAVENOUS at 08:05

## 2022-01-01 RX ADMIN — MUPIROCIN: 20 OINTMENT TOPICAL at 09:04

## 2022-01-01 RX ADMIN — MICONAZOLE NITRATE: 20 OINTMENT TOPICAL at 08:04

## 2022-01-01 RX ADMIN — AMIODARONE HYDROCHLORIDE 300 MG: 200 TABLET ORAL at 09:04

## 2022-01-01 RX ADMIN — OXYCODONE HYDROCHLORIDE AND ACETAMINOPHEN 1 TABLET: 5; 325 TABLET ORAL at 11:03

## 2022-01-01 RX ADMIN — ALLOPURINOL 200 MG: 100 TABLET ORAL at 04:06

## 2022-01-01 RX ADMIN — HEPARIN SODIUM 19 UNITS/KG/HR: 10000 INJECTION, SOLUTION INTRAVENOUS at 04:10

## 2022-01-01 RX ADMIN — MORPHINE SULFATE 4 MG: 4 INJECTION INTRAVENOUS at 08:03

## 2022-01-01 RX ADMIN — Medication 0.06 MCG/KG/MIN: at 06:10

## 2022-01-01 RX ADMIN — HYDROCODONE BITARTRATE AND ACETAMINOPHEN 1 TABLET: 10; 325 TABLET ORAL at 08:10

## 2022-01-01 RX ADMIN — SPIRONOLACTONE 25 MG: 25 TABLET, FILM COATED ORAL at 10:09

## 2022-01-01 RX ADMIN — ASPIRIN 81 MG: 81 TABLET, COATED ORAL at 08:03

## 2022-01-01 RX ADMIN — ATORVASTATIN CALCIUM 80 MG: 20 TABLET, FILM COATED ORAL at 09:04

## 2022-01-01 RX ADMIN — COLCHICINE 0.6 MG: 0.6 TABLET, FILM COATED ORAL at 10:10

## 2022-01-01 RX ADMIN — OXYCODONE 5 MG: 5 TABLET ORAL at 10:10

## 2022-01-01 RX ADMIN — HYDROCODONE BITARTRATE AND ACETAMINOPHEN 1 TABLET: 10; 325 TABLET ORAL at 08:09

## 2022-01-01 RX ADMIN — FUROSEMIDE 40 MG/HR: 10 INJECTION, SOLUTION INTRAMUSCULAR; INTRAVENOUS at 02:10

## 2022-01-01 RX ADMIN — LIDOCAINE HYDROCHLORIDE 30 MG: 20 INJECTION, SOLUTION EPIDURAL; INFILTRATION; INTRACAUDAL at 04:09

## 2022-01-01 RX ADMIN — FUROSEMIDE 40 MG: 40 TABLET ORAL at 09:10

## 2022-01-01 RX ADMIN — HEPARIN SODIUM 5000 UNITS: 5000 INJECTION INTRAVENOUS; SUBCUTANEOUS at 06:03

## 2022-01-01 RX ADMIN — BUMETANIDE 3 MG: 1 TABLET ORAL at 09:10

## 2022-01-01 RX ADMIN — ACETAMINOPHEN 650 MG: 325 TABLET ORAL at 03:09

## 2022-01-01 RX ADMIN — METOPROLOL SUCCINATE 50 MG: 50 TABLET, EXTENDED RELEASE ORAL at 09:03

## 2022-01-01 RX ADMIN — FUROSEMIDE 80 MG: 10 INJECTION, SOLUTION INTRAMUSCULAR; INTRAVENOUS at 12:09

## 2022-01-01 RX ADMIN — ENOXAPARIN SODIUM 40 MG: 100 INJECTION SUBCUTANEOUS at 06:09

## 2022-01-01 RX ADMIN — SACUBITRIL AND VALSARTAN 1 TABLET: 49; 51 TABLET, FILM COATED ORAL at 08:06

## 2022-01-01 RX ADMIN — SUGAMMADEX 200 MG: 100 INJECTION, SOLUTION INTRAVENOUS at 12:09

## 2022-01-01 RX ADMIN — Medication 0.06 MCG/KG/MIN: at 09:10

## 2022-01-01 RX ADMIN — ONDANSETRON 4 MG: 2 INJECTION INTRAMUSCULAR; INTRAVENOUS at 11:10

## 2022-01-01 RX ADMIN — ATORVASTATIN CALCIUM 40 MG: 20 TABLET, FILM COATED ORAL at 08:06

## 2022-01-01 RX ADMIN — COLCHICINE 1.2 MG: 0.6 TABLET, FILM COATED ORAL at 05:10

## 2022-01-01 RX ADMIN — FUROSEMIDE 40 MG: 40 TABLET ORAL at 08:10

## 2022-01-01 RX ADMIN — Medication 100 MCG: at 08:10

## 2022-01-01 RX ADMIN — METOPROLOL SUCCINATE 25 MG: 25 TABLET, EXTENDED RELEASE ORAL at 08:04

## 2022-01-01 RX ADMIN — Medication 0.06 MCG/KG/MIN: at 02:10

## 2022-01-01 RX ADMIN — FUROSEMIDE 40 MG: 40 TABLET ORAL at 06:10

## 2022-01-01 RX ADMIN — DIPHENHYDRAMINE HYDROCHLORIDE 25 MG: 25 CAPSULE ORAL at 08:04

## 2022-01-01 RX ADMIN — FUROSEMIDE 60 MG: 10 INJECTION, SOLUTION INTRAMUSCULAR; INTRAVENOUS at 03:10

## 2022-01-01 RX ADMIN — ACETAMINOPHEN 650 MG: 325 TABLET ORAL at 04:09

## 2022-01-01 RX ADMIN — ASPIRIN 81 MG: 81 TABLET, COATED ORAL at 08:04

## 2022-01-01 RX ADMIN — DOBUTAMINE HYDROCHLORIDE 2.5 MCG/KG/MIN: 400 INJECTION INTRAVENOUS at 03:09

## 2022-01-01 RX ADMIN — FUROSEMIDE 80 MG: 10 INJECTION, SOLUTION INTRAMUSCULAR; INTRAVENOUS at 02:09

## 2022-01-01 RX ADMIN — DIPHENHYDRAMINE HYDROCHLORIDE 25 MG: 25 CAPSULE ORAL at 06:03

## 2022-01-01 RX ADMIN — LIDOCAINE HYDROCHLORIDE 50 MG: 20 INJECTION, SOLUTION EPIDURAL; INFILTRATION; INTRACAUDAL; PERINEURAL at 10:10

## 2022-01-01 RX ADMIN — SODIUM CHLORIDE, SODIUM LACTATE, POTASSIUM CHLORIDE, AND CALCIUM CHLORIDE 250 ML: .6; .31; .03; .02 INJECTION, SOLUTION INTRAVENOUS at 08:03

## 2022-01-01 RX ADMIN — MIDODRINE HYDROCHLORIDE 2.5 MG: 2.5 TABLET ORAL at 09:04

## 2022-01-01 RX ADMIN — ATORVASTATIN CALCIUM 80 MG: 40 TABLET, FILM COATED ORAL at 10:10

## 2022-01-01 RX ADMIN — ONDANSETRON 4 MG: 2 INJECTION INTRAMUSCULAR; INTRAVENOUS at 10:10

## 2022-01-01 RX ADMIN — POTASSIUM CHLORIDE 20 MEQ: 1500 TABLET, EXTENDED RELEASE ORAL at 05:03

## 2022-01-01 RX ADMIN — METRONIDAZOLE 500 MG: 500 TABLET ORAL at 01:03

## 2022-01-01 RX ADMIN — VANCOMYCIN HYDROCHLORIDE 1250 MG: 1.25 INJECTION, POWDER, LYOPHILIZED, FOR SOLUTION INTRAVENOUS at 04:09

## 2022-01-01 RX ADMIN — DAPTOMYCIN 945 MG: 350 INJECTION, POWDER, LYOPHILIZED, FOR SOLUTION INTRAVENOUS at 11:10

## 2022-01-01 RX ADMIN — DOBUTAMINE HYDROCHLORIDE 2.5 MCG/KG/MIN: 400 INJECTION INTRAVENOUS at 08:10

## 2022-01-01 RX ADMIN — HUMAN ALBUMIN MICROSPHERES AND PERFLUTREN 0.66 MG: 10; .22 INJECTION, SOLUTION INTRAVENOUS at 09:09

## 2022-01-01 RX ADMIN — MUPIROCIN: 20 OINTMENT TOPICAL at 08:03

## 2022-01-01 RX ADMIN — LIDOCAINE HYDROCHLORIDE 100 MG: 10 INJECTION, SOLUTION EPIDURAL; INFILTRATION; INTRACAUDAL at 08:10

## 2022-01-01 RX ADMIN — PROPOFOL 25 MCG/KG/MIN: 10 INJECTION, EMULSION INTRAVENOUS at 02:10

## 2022-01-01 RX ADMIN — HYPROMELLOSE 2910 1 DROP: 5 SOLUTION OPHTHALMIC at 08:10

## 2022-01-01 RX ADMIN — POTASSIUM CHLORIDE 40 MEQ: 29.8 INJECTION, SOLUTION INTRAVENOUS at 02:10

## 2022-01-01 RX ADMIN — ONDANSETRON 4 MG: 2 INJECTION INTRAMUSCULAR; INTRAVENOUS at 09:09

## 2022-01-01 RX ADMIN — ALLOPURINOL 200 MG: 100 TABLET ORAL at 10:03

## 2022-01-01 RX ADMIN — HEPARIN SODIUM 21 UNITS/KG/HR: 10000 INJECTION, SOLUTION INTRAVENOUS at 07:10

## 2022-01-01 RX ADMIN — HALOPERIDOL LACTATE 0.5 MG: 5 INJECTION, SOLUTION INTRAMUSCULAR at 01:09

## 2022-01-01 RX ADMIN — HEPARIN SODIUM 14 UNITS/KG/HR: 10000 INJECTION, SOLUTION INTRAVENOUS at 05:10

## 2022-01-01 RX ADMIN — PROPOFOL 45 MCG/KG/MIN: 10 INJECTION, EMULSION INTRAVENOUS at 05:09

## 2022-01-01 RX ADMIN — ACETAMINOPHEN 650 MG: 325 TABLET ORAL at 12:10

## 2022-01-01 RX ADMIN — OXYCODONE HYDROCHLORIDE AND ACETAMINOPHEN 1 TABLET: 5; 325 TABLET ORAL at 03:03

## 2022-01-01 RX ADMIN — SENNOSIDES AND DOCUSATE SODIUM 1 TABLET: 50; 8.6 TABLET ORAL at 08:10

## 2022-01-01 RX ADMIN — HYDROCODONE BITARTRATE AND ACETAMINOPHEN 1 TABLET: 10; 325 TABLET ORAL at 05:06

## 2022-01-01 RX ADMIN — PROPOFOL 50 MCG/KG/MIN: 10 INJECTION, EMULSION INTRAVENOUS at 04:09

## 2022-01-01 RX ADMIN — HYDROCODONE BITARTRATE AND ACETAMINOPHEN 1 TABLET: 10; 325 TABLET ORAL at 09:09

## 2022-01-01 RX ADMIN — ONDANSETRON 4 MG: 2 INJECTION INTRAMUSCULAR; INTRAVENOUS at 05:10

## 2022-01-01 RX ADMIN — ACETAMINOPHEN 650 MG: 325 TABLET ORAL at 06:10

## 2022-01-01 RX ADMIN — MIDODRINE HYDROCHLORIDE 2.5 MG: 2.5 TABLET ORAL at 09:03

## 2022-01-01 RX ADMIN — VASOPRESSIN 0.04 UNITS/MIN: 20 INJECTION INTRAVENOUS at 01:10

## 2022-01-01 RX ADMIN — ATORVASTATIN CALCIUM 80 MG: 40 TABLET, FILM COATED ORAL at 10:09

## 2022-01-01 RX ADMIN — HEPARIN SODIUM 19 UNITS/KG/HR: 10000 INJECTION, SOLUTION INTRAVENOUS at 10:10

## 2022-01-01 RX ADMIN — DOBUTAMINE HYDROCHLORIDE 2.5 MCG/KG/MIN: 400 INJECTION INTRAVENOUS at 04:09

## 2022-01-01 RX ADMIN — FUROSEMIDE 40 MG: 10 INJECTION, SOLUTION INTRAMUSCULAR; INTRAVENOUS at 04:04

## 2022-01-01 RX ADMIN — AMIODARONE HYDROCHLORIDE 300 MG: 200 TABLET ORAL at 08:06

## 2022-01-01 RX ADMIN — DOBUTAMINE HYDROCHLORIDE 5 MCG/KG/MIN: 400 INJECTION INTRAVENOUS at 06:09

## 2022-01-01 RX ADMIN — FUROSEMIDE 80 MG: 10 INJECTION, SOLUTION INTRAMUSCULAR; INTRAVENOUS at 04:06

## 2022-01-01 RX ADMIN — SODIUM CHLORIDE: 0.9 INJECTION, SOLUTION INTRAVENOUS at 10:10

## 2022-01-01 RX ADMIN — HEPARIN SODIUM 14 UNITS/KG/HR: 10000 INJECTION, SOLUTION INTRAVENOUS at 12:10

## 2022-01-01 RX ADMIN — HYDROCODONE BITARTRATE AND ACETAMINOPHEN 1 TABLET: 10; 325 TABLET ORAL at 09:10

## 2022-01-01 RX ADMIN — DAPTOMYCIN 945 MG: 350 INJECTION, POWDER, LYOPHILIZED, FOR SOLUTION INTRAVENOUS at 12:10

## 2022-01-01 RX ADMIN — ATORVASTATIN CALCIUM 80 MG: 20 TABLET, FILM COATED ORAL at 08:03

## 2022-01-01 RX ADMIN — GUAIFENESIN AND DEXTROMETHORPHAN HYDROBROMIDE 1 TABLET: 600; 30 TABLET, EXTENDED RELEASE ORAL at 12:10

## 2022-01-01 RX ADMIN — GUAIFENESIN AND DEXTROMETHORPHAN HYDROBROMIDE 1 TABLET: 600; 30 TABLET, EXTENDED RELEASE ORAL at 08:10

## 2022-01-01 RX ADMIN — FENTANYL CITRATE 25 MCG: 50 INJECTION, SOLUTION INTRAMUSCULAR; INTRAVENOUS at 07:09

## 2022-01-01 RX ADMIN — METRONIDAZOLE 500 MG: 500 INJECTION, SOLUTION INTRAVENOUS at 08:03

## 2022-01-01 RX ADMIN — HYDROCODONE BITARTRATE AND ACETAMINOPHEN 1 TABLET: 10; 325 TABLET ORAL at 02:10

## 2022-01-01 RX ADMIN — HEPARIN SODIUM 12 UNITS/KG/HR: 10000 INJECTION, SOLUTION INTRAVENOUS at 04:10

## 2022-01-01 RX ADMIN — SODIUM CHLORIDE: 9 INJECTION, SOLUTION INTRAVENOUS at 04:09

## 2022-01-01 RX ADMIN — SODIUM CHLORIDE: 9 INJECTION, SOLUTION INTRAVENOUS at 09:09

## 2022-01-01 RX ADMIN — HYDROCODONE BITARTRATE AND ACETAMINOPHEN 1 TABLET: 10; 325 TABLET ORAL at 02:09

## 2022-01-01 RX ADMIN — SODIUM CHLORIDE: 9 INJECTION, SOLUTION INTRAVENOUS at 10:10

## 2022-01-01 RX ADMIN — FUROSEMIDE 20 MG: 10 INJECTION, SOLUTION INTRAMUSCULAR; INTRAVENOUS at 12:09

## 2022-01-01 RX ADMIN — AMIODARONE HYDROCHLORIDE 300 MG: 200 TABLET ORAL at 08:09

## 2022-01-01 RX ADMIN — HYDROCODONE BITARTRATE AND ACETAMINOPHEN 1 TABLET: 10; 325 TABLET ORAL at 10:09

## 2022-01-01 RX ADMIN — HEPARIN SODIUM 19 UNITS/KG/HR: 10000 INJECTION, SOLUTION INTRAVENOUS at 11:10

## 2022-01-01 RX ADMIN — HYDROMORPHONE HYDROCHLORIDE 0.2 MG: 1 INJECTION, SOLUTION INTRAMUSCULAR; INTRAVENOUS; SUBCUTANEOUS at 03:09

## 2022-01-01 RX ADMIN — AMIODARONE HYDROCHLORIDE 300 MG: 100 TABLET ORAL at 08:04

## 2022-01-01 RX ADMIN — DOBUTAMINE HYDROCHLORIDE 2.5 MCG/KG/MIN: 400 INJECTION INTRAVENOUS at 07:03

## 2022-01-01 RX ADMIN — VANCOMYCIN HYDROCHLORIDE 125 MG: KIT at 11:03

## 2022-01-01 RX ADMIN — OLOPATADINE HYDROCHLORIDE 1 DROP: 2 SOLUTION OPHTHALMIC at 02:10

## 2022-01-01 RX ADMIN — DOBUTAMINE HYDROCHLORIDE 2.5 MCG/KG/MIN: 400 INJECTION INTRAVENOUS at 02:09

## 2022-01-01 RX ADMIN — AZITHROMYCIN MONOHYDRATE 500 MG: 250 TABLET ORAL at 08:10

## 2022-01-01 RX ADMIN — MUPIROCIN: 20 OINTMENT TOPICAL at 10:03

## 2022-01-01 RX ADMIN — PIPERACILLIN SODIUM AND TAZOBACTAM SODIUM 4.5 G: 4; .5 INJECTION, POWDER, LYOPHILIZED, FOR SOLUTION INTRAVENOUS at 09:09

## 2022-01-01 RX ADMIN — DOBUTAMINE HYDROCHLORIDE 3.75 MCG/KG/MIN: 400 INJECTION INTRAVENOUS at 12:10

## 2022-01-01 RX ADMIN — DOBUTAMINE HYDROCHLORIDE 2.5 MCG/KG/MIN: 400 INJECTION INTRAVENOUS at 08:09

## 2022-01-01 RX ADMIN — ACETAMINOPHEN 650 MG: 325 TABLET ORAL at 11:10

## 2022-01-01 RX ADMIN — PROPOFOL 35 MCG/KG/MIN: 10 INJECTION, EMULSION INTRAVENOUS at 08:09

## 2022-01-01 RX ADMIN — Medication 0.02 MCG/KG/MIN: at 11:10

## 2022-01-01 RX ADMIN — DEXMEDETOMIDINE HYDROCHLORIDE 0.4 MCG/KG/HR: 4 INJECTION INTRAVENOUS at 12:09

## 2022-01-01 RX ADMIN — ALLOPURINOL 200 MG: 100 TABLET ORAL at 09:04

## 2022-01-01 RX ADMIN — SODIUM CHLORIDE: 0.9 INJECTION, SOLUTION INTRAVENOUS at 03:10

## 2022-01-01 RX ADMIN — VASOPRESSIN 0.04 UNITS/MIN: 20 INJECTION INTRAVENOUS at 09:09

## 2022-01-01 RX ADMIN — VASOPRESSIN 0.04 UNITS/MIN: 20 INJECTION INTRAVENOUS at 05:10

## 2022-01-01 RX ADMIN — VASOPRESSIN 0.04 UNITS/MIN: 20 INJECTION INTRAVENOUS at 01:09

## 2022-01-01 RX ADMIN — HEPARIN SODIUM 18 UNITS/KG/HR: 10000 INJECTION, SOLUTION INTRAVENOUS at 11:09

## 2022-01-01 RX ADMIN — DEXMEDETOMIDINE HYDROCHLORIDE 0.5 MCG/KG/HR: 4 INJECTION INTRAVENOUS at 02:09

## 2022-01-01 RX ADMIN — ACETAMINOPHEN 650 MG: 325 TABLET ORAL at 04:10

## 2022-01-01 RX ADMIN — PIPERACILLIN SODIUM AND TAZOBACTAM SODIUM 4.5 G: 4; .5 INJECTION, POWDER, LYOPHILIZED, FOR SOLUTION INTRAVENOUS at 10:10

## 2022-01-01 RX ADMIN — MAGNESIUM SULFATE 2 G: 2 INJECTION INTRAVENOUS at 06:04

## 2022-01-01 RX ADMIN — HEPARIN SODIUM 5000 UNITS: 5000 INJECTION INTRAVENOUS; SUBCUTANEOUS at 05:05

## 2022-01-01 RX ADMIN — MIDAZOLAM HYDROCHLORIDE 0.5 MG: 1 INJECTION, SOLUTION INTRAMUSCULAR; INTRAVENOUS at 05:10

## 2022-01-01 RX ADMIN — ALLOPURINOL 200 MG: 100 TABLET ORAL at 10:04

## 2022-01-01 RX ADMIN — OXYCODONE 5 MG: 5 TABLET ORAL at 12:09

## 2022-01-01 RX ADMIN — ETOMIDATE 6 MG: 2 INJECTION INTRAVENOUS at 03:10

## 2022-01-01 RX ADMIN — FUROSEMIDE 40 MG: 40 TABLET ORAL at 06:09

## 2022-01-01 RX ADMIN — FUROSEMIDE 40 MG: 10 INJECTION, SOLUTION INTRAMUSCULAR; INTRAVENOUS at 08:04

## 2022-01-01 RX ADMIN — POLYETHYLENE GLYCOL 3350 17 G: 17 POWDER, FOR SOLUTION ORAL at 08:09

## 2022-01-01 RX ADMIN — HEPARIN SODIUM 18 UNITS/KG/HR: 10000 INJECTION, SOLUTION INTRAVENOUS at 12:09

## 2022-01-01 RX ADMIN — SACUBITRIL AND VALSARTAN 1 TABLET: 49; 51 TABLET, FILM COATED ORAL at 04:03

## 2022-01-01 RX ADMIN — FUROSEMIDE 40 MG: 40 TABLET ORAL at 09:03

## 2022-01-01 RX ADMIN — ASPIRIN 81 MG: 81 TABLET, COATED ORAL at 08:05

## 2022-01-01 RX ADMIN — DOBUTAMINE HYDROCHLORIDE 2.5 MCG/KG/MIN: 400 INJECTION INTRAVENOUS at 10:09

## 2022-01-01 RX ADMIN — HEPARIN SODIUM 18 UNITS/KG/HR: 10000 INJECTION, SOLUTION INTRAVENOUS at 01:10

## 2022-01-01 RX ADMIN — SODIUM CHLORIDE: 0.9 INJECTION, SOLUTION INTRAVENOUS at 09:10

## 2022-01-01 RX ADMIN — ATORVASTATIN CALCIUM 80 MG: 20 TABLET, FILM COATED ORAL at 09:03

## 2022-01-01 RX ADMIN — OXYCODONE HYDROCHLORIDE AND ACETAMINOPHEN 1 TABLET: 5; 325 TABLET ORAL at 09:03

## 2022-01-01 RX ADMIN — VANCOMYCIN HYDROCHLORIDE 125 MG: KIT at 07:04

## 2022-01-01 RX ADMIN — FUROSEMIDE 40 MG: 40 TABLET ORAL at 10:09

## 2022-01-01 RX ADMIN — HEPARIN SODIUM 18 UNITS/KG/HR: 10000 INJECTION, SOLUTION INTRAVENOUS at 08:10

## 2022-01-01 RX ADMIN — MICONAZOLE NITRATE: 20 OINTMENT TOPICAL at 08:03

## 2022-01-01 RX ADMIN — ALLOPURINOL 200 MG: 100 TABLET ORAL at 09:03

## 2022-01-01 RX ADMIN — ONDANSETRON 4 MG: 2 INJECTION INTRAMUSCULAR; INTRAVENOUS at 06:10

## 2022-01-01 RX ADMIN — ONDANSETRON 4 MG: 2 INJECTION INTRAMUSCULAR; INTRAVENOUS at 08:10

## 2022-01-01 RX ADMIN — Medication 0.06 MCG/KG/MIN: at 12:10

## 2022-01-01 RX ADMIN — METRONIDAZOLE 500 MG: 500 TABLET ORAL at 02:03

## 2022-01-01 RX ADMIN — VANCOMYCIN HYDROCHLORIDE 750 MG: 750 INJECTION, POWDER, LYOPHILIZED, FOR SOLUTION INTRAVENOUS at 11:09

## 2022-01-01 RX ADMIN — HYDROCODONE BITARTRATE AND ACETAMINOPHEN 1 TABLET: 10; 325 TABLET ORAL at 01:09

## 2022-01-01 RX ADMIN — SENNOSIDES AND DOCUSATE SODIUM 1 TABLET: 50; 8.6 TABLET ORAL at 12:09

## 2022-01-01 RX ADMIN — ALLOPURINOL 200 MG: 300 TABLET ORAL at 08:05

## 2022-01-01 RX ADMIN — Medication 0.02 MCG/KG/MIN: at 09:10

## 2022-01-01 RX ADMIN — ETOMIDATE 8 MG: 2 INJECTION, SOLUTION INTRAVENOUS at 10:08

## 2022-01-01 RX ADMIN — DIPHENHYDRAMINE HYDROCHLORIDE 25 MG: 50 INJECTION, SOLUTION INTRAMUSCULAR; INTRAVENOUS at 09:04

## 2022-01-01 RX ADMIN — OXYCODONE HYDROCHLORIDE AND ACETAMINOPHEN 1 TABLET: 5; 325 TABLET ORAL at 12:03

## 2022-01-01 RX ADMIN — POLYETHYLENE GLYCOL 3350 17 G: 17 POWDER, FOR SOLUTION ORAL at 03:09

## 2022-01-01 RX ADMIN — Medication 0.02 MCG/KG/MIN: at 05:09

## 2022-01-01 RX ADMIN — ONDANSETRON 4 MG: 2 INJECTION INTRAMUSCULAR; INTRAVENOUS at 05:09

## 2022-01-01 RX ADMIN — AMIODARONE HYDROCHLORIDE 300 MG: 100 TABLET ORAL at 09:03

## 2022-01-01 RX ADMIN — PREDNISONE 40 MG: 20 TABLET ORAL at 12:03

## 2022-01-01 RX ADMIN — Medication 6 MG: at 09:10

## 2022-01-01 RX ADMIN — VANCOMYCIN HYDROCHLORIDE 1000 MG: 1 INJECTION, POWDER, LYOPHILIZED, FOR SOLUTION INTRAVENOUS at 01:09

## 2022-01-01 RX ADMIN — HEPARIN SODIUM 5000 UNITS: 5000 INJECTION INTRAVENOUS; SUBCUTANEOUS at 08:06

## 2022-01-01 RX ADMIN — DOBUTAMINE HYDROCHLORIDE 2.5 MCG/KG/MIN: 400 INJECTION INTRAVENOUS at 04:06

## 2022-01-01 RX ADMIN — SODIUM CHLORIDE: 0.9 INJECTION, SOLUTION INTRAVENOUS at 09:08

## 2022-01-01 RX ADMIN — OXYCODONE HYDROCHLORIDE AND ACETAMINOPHEN 1 TABLET: 5; 325 TABLET ORAL at 05:03

## 2022-01-01 RX ADMIN — VANCOMYCIN HYDROCHLORIDE 1000 MG: 1 INJECTION, POWDER, LYOPHILIZED, FOR SOLUTION INTRAVENOUS at 05:10

## 2022-01-01 RX ADMIN — ROCURONIUM BROMIDE 100 MG: 10 INJECTION INTRAVENOUS at 07:09

## 2022-01-01 RX ADMIN — METRONIDAZOLE 500 MG: 500 TABLET ORAL at 06:03

## 2022-01-01 RX ADMIN — AMIODARONE HYDROCHLORIDE 300 MG: 200 TABLET ORAL at 08:05

## 2022-01-01 RX ADMIN — HYDROCODONE BITARTRATE AND ACETAMINOPHEN 1 TABLET: 10; 325 TABLET ORAL at 05:09

## 2022-01-01 RX ADMIN — AMIODARONE HYDROCHLORIDE 300 MG: 200 TABLET ORAL at 03:09

## 2022-01-01 RX ADMIN — SODIUM CHLORIDE: 0.9 INJECTION, SOLUTION INTRAVENOUS at 11:10

## 2022-01-01 RX ADMIN — DOBUTAMINE HYDROCHLORIDE 2.5 MCG/KG/MIN: 400 INJECTION INTRAVENOUS at 06:05

## 2022-01-01 RX ADMIN — DOBUTAMINE HYDROCHLORIDE 3.75 MCG/KG/MIN: 400 INJECTION INTRAVENOUS at 10:10

## 2022-01-01 RX ADMIN — VASOPRESSIN 0.04 UNITS/MIN: 20 INJECTION INTRAVENOUS at 09:10

## 2022-01-01 RX ADMIN — SENNOSIDES AND DOCUSATE SODIUM 1 TABLET: 50; 8.6 TABLET ORAL at 12:10

## 2022-01-01 RX ADMIN — PIPERACILLIN SODIUM AND TAZOBACTAM SODIUM 4.5 G: 4; .5 INJECTION, POWDER, LYOPHILIZED, FOR SOLUTION INTRAVENOUS at 06:10

## 2022-01-01 RX ADMIN — CEFTRIAXONE 2 G: 2 INJECTION, SOLUTION INTRAVENOUS at 12:03

## 2022-01-01 RX ADMIN — OLOPATADINE HYDROCHLORIDE 1 DROP: 2 SOLUTION OPHTHALMIC at 03:10

## 2022-01-01 RX ADMIN — ASPIRIN 81 MG: 81 TABLET, COATED ORAL at 10:03

## 2022-01-01 RX ADMIN — ETOMIDATE 10 MG: 2 INJECTION INTRAVENOUS at 07:09

## 2022-01-01 RX ADMIN — FUROSEMIDE 40 MG: 10 INJECTION, SOLUTION INTRAMUSCULAR; INTRAVENOUS at 05:10

## 2022-01-01 RX ADMIN — AMIODARONE HYDROCHLORIDE 300 MG: 100 TABLET ORAL at 10:03

## 2022-01-01 RX ADMIN — DOBUTAMINE HYDROCHLORIDE 5 MCG/KG/MIN: 400 INJECTION INTRAVENOUS at 06:10

## 2022-01-01 RX ADMIN — LIDOCAINE HYDROCHLORIDE 2 ML: 10 INJECTION, SOLUTION INFILTRATION; PERINEURAL at 05:10

## 2022-01-01 RX ADMIN — MORPHINE SULFATE 6 MG: 2 INJECTION, SOLUTION INTRAMUSCULAR; INTRAVENOUS at 05:09

## 2022-01-01 RX ADMIN — ENOXAPARIN SODIUM 40 MG: 100 INJECTION SUBCUTANEOUS at 04:06

## 2022-01-01 RX ADMIN — AMIODARONE HYDROCHLORIDE 300 MG: 200 TABLET ORAL at 10:09

## 2022-01-01 RX ADMIN — FAMOTIDINE 20 MG: 10 INJECTION INTRAVENOUS at 10:10

## 2022-01-01 RX ADMIN — FUROSEMIDE 40 MG/HR: 10 INJECTION, SOLUTION INTRAMUSCULAR; INTRAVENOUS at 12:10

## 2022-01-01 RX ADMIN — HUMAN ALBUMIN MICROSPHERES AND PERFLUTREN 0.66 MG: 10; .22 INJECTION, SOLUTION INTRAVENOUS at 03:06

## 2022-01-01 RX ADMIN — METRONIDAZOLE 500 MG: 500 TABLET ORAL at 09:03

## 2022-01-01 RX ADMIN — SODIUM CHLORIDE: 0.9 INJECTION, SOLUTION INTRAVENOUS at 01:09

## 2022-01-01 RX ADMIN — ALLOPURINOL 200 MG: 100 TABLET ORAL at 08:04

## 2022-01-01 RX ADMIN — ASPIRIN 81 MG: 81 TABLET, COATED ORAL at 09:03

## 2022-01-01 RX ADMIN — LIDOCAINE 1 PATCH: 50 PATCH CUTANEOUS at 05:10

## 2022-01-01 RX ADMIN — HEPARIN SODIUM 19 UNITS/KG/HR: 10000 INJECTION, SOLUTION INTRAVENOUS at 03:10

## 2022-01-01 RX ADMIN — IOHEXOL 100 ML: 350 INJECTION, SOLUTION INTRAVENOUS at 09:04

## 2022-01-01 RX ADMIN — POTASSIUM CHLORIDE 40 MEQ: 29.8 INJECTION, SOLUTION INTRAVENOUS at 07:10

## 2022-01-01 RX ADMIN — LORAZEPAM 0.5 MG: 2 INJECTION INTRAMUSCULAR; INTRAVENOUS at 08:03

## 2022-01-01 RX ADMIN — ATORVASTATIN CALCIUM 80 MG: 20 TABLET, FILM COATED ORAL at 10:03

## 2022-01-01 RX ADMIN — MAGNESIUM SULFATE 2 G: 2 INJECTION INTRAVENOUS at 05:10

## 2022-01-01 RX ADMIN — ONDANSETRON 4 MG: 2 INJECTION INTRAMUSCULAR; INTRAVENOUS at 07:10

## 2022-01-01 RX ADMIN — HEPARIN SODIUM 18 UNITS/KG/HR: 10000 INJECTION, SOLUTION INTRAVENOUS at 09:09

## 2022-01-01 RX ADMIN — VANCOMYCIN HYDROCHLORIDE 1250 MG: 1.25 INJECTION, POWDER, LYOPHILIZED, FOR SOLUTION INTRAVENOUS at 06:09

## 2022-01-01 SDOH — SOCIAL DETERMINANTS OF HEALTH (SDOH): FINANCIAL INSECURITY: Z59.86

## 2022-01-17 NOTE — DISCHARGE INSTRUCTIONS
It was a pleasure taking care of you today!     Diagnosis: Shortness of Breath    Home Care:   - Continue taking medications as prescribed    Follow-Up Plan:  - Follow-up with primary care doctor within 3 - 5 days to discuss your recent ER visit and any additional concerns that you may have.  - Additional testing and/or evaluation as directed by your primary doctor    Return to the Emergency Department for symptoms including but not limited to: persistence or worsening of symptoms, shortness of breath or chest pain, inability to drink without vomiting, passing out/fainting/ loss of consciousness, or if you have other concerns.

## 2022-01-17 NOTE — ED PROVIDER NOTES
Encounter Date: 1/16/2022       History     Chief Complaint   Patient presents with    COVID-19 Concerns     Pt arrives with complaint of covid exposure 1 week ago. Pt states feeling sob and chest congestion. Denies any other symptoms     72 yo male with PMH of CHF, PE on plavix, CAD, and HLD presents with sob. SOB is chronic, feels similar to baseline, aggravated with exertion, and associated with nasal congestion and mild chest discomfort. Pt is concerned he has covid-19. He denies fever, chills, abdominal pain, nausea, vomiting, diarrhea, constipation, black/bloody stools, dysuria, and hematuria. He denies new/worsening swelling of his lower extremities or abdomen. Exposed to covid one week ago. Former smoker and denies recreational drug use. Reports compliance with all medications.     The history is provided by the patient.     Review of patient's allergies indicates:   Allergen Reactions    Iodine containing multivitamin Swelling     itching    Keflex [cephalexin] Swelling     Eyes.  Tolerated multiple doses of zosyn and 1 dose of augmentin in 2015 and 2016, respectively    Peaches [peach (prunus persica)] Swelling     eyes    Shellfish containing products Swelling    Fig tree Swelling     itching    Tuberculin spenser test ppd Rash     Past Medical History:   Diagnosis Date    Acute hypoxemic respiratory failure 11/7/2015    Acute idiopathic gout of right elbow 9/23/2021    AICD (automatic cardioverter/defibrillator) present 12/13/2015      Anticoagulant long-term use     CHF (congestive heart failure)     Chronic anticoagulation 5/5/2016    Chronic combined systolic and diastolic heart failure 11/26/2012    EF 10-20% on ECHO 2013    Chronic gout 12/2/2019    Clotting disorder     Coronary artery disease involving native coronary artery of native heart without angina pectoris 11/26/2012    Cath 10- Stents patent non-obstructive disease Cath 11-12015 non-obstructive disease      COVID-19 08/2021    Had antibody infusion    Diverticulosis of colon     DVT (deep venous thrombosis), unspecified laterality 11/12/2015    Dysphonia 1/28/2018    Essential hypertension 11/15/2015    Fine motor impairment 2/2/2021    Hyperlipidemia     Hypertensive heart disease with heart failure 5/5/2016    MI (myocardial infarction) 2009    x's 5    Nicotine abuse     Obesity 11/26/2012    Olecranon bursitis of right elbow 9/19/2021    Pulmonary embolism 2011    Renal disorder     LAVERNE    Right carpal tunnel syndrome 4/6/2018    Stented coronary artery 11/26/2012    LAD stent placed 10/17/2007     Trigger thumb of right hand 4/6/2018     Past Surgical History:   Procedure Laterality Date    APPENDECTOMY      BACK SURGERY      CARDIAC DEFIBRILLATOR PLACEMENT      CARDIAC SURGERY  2007    stent    CARPAL TUNNEL RELEASE Right 04/2018    INSERTION OF BIVENTRICULAR IMPLANTABLE CARDIOVERTER-DEFIBRILLATOR (ICD) N/A 5/3/2019    Procedure: INSERTION, ICD, BIVENTRICULAR;  Surgeon: Teofilo Pal MD;  Location: [facility] EP LAB;  Service: Cardiology;  Laterality: N/A;  ICH CM,  ICD UPGD BI-V,  SJM, MAC, FAS, 3PREP (dual ICD INSITU)    r knee scope      REVISION OF SKIN POCKET FOR CARDIOVERTER-DEFIBRILLATOR Left 5/3/2019    Procedure: Revision, Skin Pocket, For Cardioverter-Defibrillator;  Surgeon: Teofilo Pal MD;  Location: [facility] EP [facility];  Service: Cardiology;  Laterality: Left;    RIGHT HEART CATHETERIZATION Right 6/14/2021    Procedure: INSERTION, CATHETER, RIGHT HEART;  Surgeon: Lizz Nieto MD;  Location: [facility] CATH LAB;  Service: Cardiology;  Laterality: Right;    SPINE SURGERY      TONSILLECTOMY      TRIGGER FINGER RELEASE Right 04/2018    thumb     Family History   Problem Relation Age of Onset    Cancer Mother         colon cancer    Heart disease Mother     Diabetes Mother     Heart disease Father     Stroke Father     Colon polyps Father     Cancer Paternal Grandmother          leukemia    No Known Problems Sister     No Known Problems Daughter     No Known Problems Son     No Known Problems Sister     No Known Problems Son      Social History     Tobacco Use    Smoking status: Former Smoker     Packs/day: 1.00     Years: 45.00     Pack years: 45.00     Types: Cigarettes     Quit date: 2005     Years since quittin.1    Smokeless tobacco: Never Used    Tobacco comment: 1-1.5 ppd every day.   Substance Use Topics    Alcohol use: No    Drug use: No     Review of Systems   Constitutional: Negative for chills and fever.   HENT: Positive for congestion.    Eyes: Negative for visual disturbance.   Respiratory: Positive for shortness of breath. Negative for cough.    Cardiovascular: Positive for chest pain. Negative for leg swelling.   Gastrointestinal: Negative for abdominal pain, blood in stool, constipation, diarrhea, nausea and vomiting.   Endocrine: Negative for polyuria.   Genitourinary: Negative for dysuria and hematuria.   Musculoskeletal: Negative for back pain.   Skin: Negative for color change and rash.   Neurological: Negative for dizziness, syncope, weakness and headaches.       Physical Exam     Initial Vitals [22 2208]   BP Pulse Resp Temp SpO2   (!) 140/77 78 18 97.9 °F (36.6 °C) 97 %      MAP       --         Physical Exam    Nursing note and vitals reviewed.  Constitutional: He appears well-developed and well-nourished. He is not diaphoretic. No distress.   Pt resting comfortably in bed in no acute distress   HENT:   Head: Normocephalic and atraumatic.   Eyes: Conjunctivae and EOM are normal. Pupils are equal, round, and reactive to light.   Neck: Neck supple. No JVD present.   Normal range of motion.  Cardiovascular: Normal rate, regular rhythm, normal heart sounds and intact distal pulses.   No murmur heard.  Pulmonary/Chest: Breath sounds normal. No respiratory distress. He has no wheezes. He has no rhonchi. He has no rales.   No increased  work of breathing or tachypnea. CTAB   Abdominal: Abdomen is soft. He exhibits no distension. There is no abdominal tenderness. There is no rebound and no guarding.   Musculoskeletal:         General: No tenderness. Normal range of motion.      Cervical back: Normal range of motion and neck supple.      Comments: No pitting edema to b/l LE     Neurological: He is alert and oriented to person, place, and time.   Skin: Skin is warm and dry. Capillary refill takes less than 2 seconds.         ED Course   Procedures  Labs Reviewed   CBC W/ AUTO DIFFERENTIAL - Abnormal; Notable for the following components:       Result Value    MCHC 31.5 (*)     All other components within normal limits   TROPONIN I - Abnormal; Notable for the following components:    Troponin I 0.031 (*)     All other components within normal limits   B-TYPE NATRIURETIC PEPTIDE - Abnormal; Notable for the following components:     (*)     All other components within normal limits   COMPREHENSIVE METABOLIC PANEL   SARS-COV-2 (COVID-19) QUALITATIVE PCR    Narrative:     Is the patient symptomatic?->Yes  Is this needed for pre-procedure or pre-op testing?->No   SARS-COV-2 RDRP GENE    Narrative:     This test utilizes isothermal nucleic acid amplification   technology to detect the SARS-CoV-2 RdRp nucleic acid segment.   The analytical sensitivity (limit of detection) is 125 genome   equivalents/mL.   A POSITIVE result implies infection with the SARS-CoV-2 virus;   the patient is presumed to be contagious.     A NEGATIVE result means that SARS-CoV-2 nucleic acids are not   present above the limit of detection. A NEGATIVE result should be   treated as presumptive. It does not rule out the possibility of   COVID-19 and should not be the sole basis for treatment decisions.   If COVID-19 is strongly suspected based on clinical and exposure   history, re-testing using an alternate molecular assay should be   considered.   This test is only for use under  the Food and Drug   Administration s Emergency Use Authorization (EUA).   Commercial kits are provided by [employer].   Performance characteristics of the EUA have been independently   verified by [facility] Department of   Pathology and Laboratory Medicine.   _________________________________________________________________   The authorized Fact Sheet for Healthcare Providers and the authorized Fact   Sheet for Patients of the ID NOW COVID-19 are available on the FDA   website:     https://www.fda.gov/media/103693/download  https://www.fda.gov/media/997142/download         EKG Readings: (Independently Interpreted)   Paced rhythm at a rate of 73, no STEMI, normal intervals, normal T wave morphology. Overall unremarkable and baseline EKG.        Imaging Results          X-Ray Chest AP Portable (Final result)  Result time 01/16/22 23:59:53    Final result by Denton Samayoa MD (01/16/22 23:59:53)                 Impression:      Cardiomegaly and probable mild interstitial edema.  Pacing device present.    Electronically signed by resident: Jorge Alberto Saunders  Date:    01/16/2022  Time:    23:53    Electronically signed by: Denton Samayoa MD  Date:    01/16/2022  Time:    23:59             Narrative:    EXAMINATION:  XR CHEST AP PORTABLE    CLINICAL HISTORY:  CHF;    TECHNIQUE:  Single frontal portable view of the chest was performed.    COMPARISON:  Chest radiograph 12/12/2021 CT chest 12/12/2021.    FINDINGS:  Left chest wall pacemaker with 3 transvenous leads in stable position.    Mediastinal structures midline.  Cardiac silhouette enlarged but stable.  Aortic arch calcification.  Small posterior diaphragm hernia on the left side behind the heart corresponding to finding on CT 12/12/2021.    Mild interstitial opacities in the lower lung zones, similar to prior and suggestive of edema.  No large pleural fluid.  No pneumothorax.    No significant osseous abnormality.                                "  Medications   HYDROcodone-acetaminophen  mg per tablet 1 tablet (1 tablet Oral Given 1/17/22 0112)     Medical Decision Making:   History:   Old Medical Records: I decided to obtain old medical records.  Old Records Summarized: records from previous admission(s).  Initial Assessment:   70 yo male with PMH of CHF, PE on plavix, CAD, and HLD presents with 2-day hx of congestion and chest discomfort and concerns for covid, reports baseline sob, afebrile and hemodynamically stable, PE unremarkable for focal findings. Given extensive hx, we will workup for CHF exacerbation, pneumonia.   Differential Diagnosis:   Covid, viral URI, pneumonia, CHF, COPD, ACS, PE  Clinical Tests:   Lab Tests: Ordered and Reviewed  Radiological Study: Ordered and Reviewed  Medical Tests: Ordered and Reviewed  ED Management:  See ED course            Attending Attestation:   Physician Attestation Statement for Resident:  As the supervising MD   Physician Attestation Statement: I have personally seen and examined this patient.   I agree with the above history. -: 70 yo M with extensive pmhx including CAD s/p MI, CHF (EF 10-15%), VTach s/p AICD, DVT/PE presents after COVID-19 exposure.  Patient reports that all his family members have COVID-19.  He does endorse shortness of breath and "chest discomfort" but reports that this is at baseline and no worse today than usual.  He presents with his partner who also suspect that she has COVID-19.  Patient has a history of a PE and in the chart it notes that he is on Plavix and aspirin and reports 100% compliance with this.  Patient is under the impression that he is on Xarelto as well and reports compliance with that.  He had a recent CTA of his chest several weeks prior that was negative for acute PE.  No signs of DVT on exam today.  I do not suspect acute PE as the etiology of his symptoms.    Reassessment:  Vitals are stable.  Despite minimal laboratory abnormalities, patient overall well " appearing.  Troponin is elevated but decreased from previous.  BNP is elevated at 334 which is consistent with heart failure but severity of patient's symptoms do not require hospitalization.   As the supervising MD I agree with the above PE.    As the supervising MD I agree with the above treatment, course, plan, and disposition.                ED Course as of 01/17/22 0455   Mon Jan 17, 2022   0055 Troponin I(!): 0.031  Elevated but stable compared to prior [BD]   0055 BNP(!): 334  Elevated but not consistent with CHF exacerbation [BD]   0056 CBC auto differential(!)  CBC unremarkable without leukocytosis, anemia, or decreased platelets [BD]   0056 Comprehensive metabolic panel  CMP unremarkable without electrolyte derangement, impaired renal function, or elevated LFTs [BD]   0056 SARS-CoV-2 RNA, Amplification, Qual: Negative [BD]   0056 X-Ray Chest AP Portable  CXR shows mild pulmonary edema that is similar to prior [BD]   0128 Pt's workup unremarkable, and he may be discharged at this time. He has been instructed to follow up with his PCP and has been given strict return precautions. Pt agrees with and is comfortable with the plan.  [BD]      ED Course User Index  [BD] Swapnil Yun MD             Clinical Impression:   Final diagnoses:  [R06.02] Shortness of breath  [Z20.822] Lab test negative for COVID-19 virus (Primary)  [R09.81] Nasal congestion  [Z86.79] H/O ventricular tachycardia (Chronic)  [Z95.810] Presence of cardiac resynchronization therapy defibrillator (CRT-D)  [E66.01] Severe obesity (BMI 35.0-39.9) with comorbidity (Chronic)          ED Disposition Condition    Discharge Stable        ED Prescriptions     None        Follow-up Information     Follow up With Specialties Details Why Contact Info    January Khan MD Internal Medicine Schedule an appointment as soon as possible for a visit  To discuss your recent ER visit and any additional concerns that you may have [address]  [place]  [address]  [phone]      [facility] Dept Emergency Medicine Go to  As needed, If symptoms worsen [address]  [facility] [id number] [phone]           Swapnil Yun MD  Resident  01/17/22 0452

## 2022-01-24 NOTE — TELEPHONE ENCOUNTER
Called Pt. Sister to get more information on what it is she needs to know about her brother. Left message and call back number.    ----- Message from Sofia Cardoza sent at 1/24/2022 12:48 PM CST -----  Contact: Diana Oneil (pt's sister) [phone]  GENERAL CALL BACK    Contact Preference?:    Diana Oneil (pt's sister) [phone]    What is the nature of the call?:    Diana Oneil (pt's sister) called with general questions on pt's breathing device. Needs to speak to someone from Dr. Gama's staff for more info (mentioned an [place]).

## 2022-01-25 NOTE — TELEPHONE ENCOUNTER
Returned call to pt's sister, no answer. [facility] with number, [phone], for her to return call.

## 2022-01-28 NOTE — TELEPHONE ENCOUNTER
Dr. Nina spoke with me today regarding this patient and transfer to [facility].  I have called the transfer center and accepted him but we were on diversion and may be a while before ICU bed is available.  I let Dr. Simon know that as well.  I also shared with him the last clinic note from July 2021 where the patient made clear he does not want LVAD.  When he is in extremis that may not be an option but I am more than happy to accept him since he has changed his mind.  He is on pressor support at present and we discussed the possibility of IABP or Impella insertion if felt clinically necessary prior to transfer since it may take a while to get a bed.    Dr. Nieto is on service I spoke with her as well as the transfer center.

## 2022-01-29 NOTE — TELEPHONE ENCOUNTER
Pt son calling in reference to pt transfer from [facility] to [facility]. Directed to speak with medical team at  to get a status update.

## 2022-01-29 NOTE — TELEPHONE ENCOUNTER
Pt's sister reports pt is at [facility] currently and was accepted to be transferred to [facility], but no beds were available as of last night, on waiting list, went to Palliative care, sister trying to find out if/when he can be transferred. Sister advised a message will be routed to pt's MD per protocol, Sister was then transferred to speak to the House Supervisor at [facility].    Reason for Disposition   [1] Caller requesting NON-URGENT health information AND [2] PCP's office is the best resource    Protocols used: INFORMATION ONLY CALL - NO TRIAGE-A-

## 2022-01-30 NOTE — TELEPHONE ENCOUNTER
"pts sister calling stating that she wants her brother moved from [facility] to [facility]. Informed her she needed to speak with the team there but also provider her with pt relations number as she feels "like the ball has been dropped on her brothers care" since she has been calling for a while now to get him moved. Will route to pts PCP.     Reason for Disposition   [1] Follow-up call to recent contact AND [2] information only call, no triage required    Protocols used: INFORMATION ONLY CALL - NO TRIAGE-A-      "

## 2022-01-31 NOTE — TELEPHONE ENCOUNTER
In response to on call message returned call to pts number listed x, but voice mail names are not pts so did not leave a msg. Did not see a number for pts son, but pts sister Riana is listed as contact. Sister informed me that  Pt was waiting for a transfer from [facility] to [facility]. Informed sister that if she was informed by doctor there that a transfer was already underway and pt was waiting for a bed then the doctors have made the appropriate contact for this process to take place.Riana verbalized understanding.

## 2022-02-11 NOTE — PROGRESS NOTES
Health Maintenance Due   Topic Date Due    COVID-19 Vaccine (1) Never done    Shingles Vaccine (2 of 3) 12/15/2017    PROSTATE-SPECIFIC ANTIGEN  12/04/2020    Lipid Panel  01/20/2022     Updates were requested from care everywhere.  Chart was reviewed for overdue Proactive [facility] Encounters ([facility]) topics (CRS, Breast Cancer Screening, Eye exam)  Health Maintenance has been updated.  LINKS immunization registry triggered.  Immunizations were reconciled.

## 2022-03-04 NOTE — TELEPHONE ENCOUNTER
Called Mr. Oneil to remind him of his appt Monday afternoon with Dr. Gama. Left message and call back number.

## 2022-03-07 NOTE — TELEPHONE ENCOUNTER
----- Message from Kenneth Inman MA sent at 3/7/2022 10:08 AM CST -----    ----- Message -----  From: Tammy Coughlin MA  Sent: 3/7/2022   9:42 AM CST  To: [facility] Staff    The patient sister Riana would like to talk to you about her brother appointment  with  and his condition . Please call [phone]. Thank you.

## 2022-03-07 NOTE — TELEPHONE ENCOUNTER
Returned the daughter Riana's call on this am.(Riana is the listed care giver for this pt). The daughter stated the pt's remote ICD home monitor is currently connected @ her house which is where the pt was living prior to his going into the hospital.  Instructed the daughter to bring the monitor to [facility] facility where pt is currently in and to please contact the Device clinic once she reconnected it for assistance in sending a manual transmission.  The sister had a lot of questions as it relates to pt being able to get back into the [employer] system to be reworked up for a LVAD then Heart transplant.  Informed the sister that the pt would have to be evaluated by the Heart Failure/Transplant doctors as it relates to this.  At the current time, the sister stated the pt is unable to ambulate on his own and is only able to sit up in a chair with much assistance and is only able to do this for 20 mins as per the sister. Again informed the sister that he would have to be evaluated and worked up by any/all the doctors who work in that area.  Lastly, I did inform the sister that a manual transmission is needed prior to his Virtual visit with Dr. COURTNEY Gama. As well as pt's monitor needs to stay connected no more than 6 ft from where the pt sleeps at all times. Understanding was verbalized.  The sister appreciated the call and the assistance.

## 2022-03-08 NOTE — TELEPHONE ENCOUNTER
Received a call from pt's sister Riana on this am whom was at the Nursing Facility with the pt.  Assisted them in sending a manual transmission via pt's [employer] remote ICD home monitor.  Informed the sister the report will be given to Dr. Gama's MA and that she will reach out to her to reschedule the Virtual visit to a sooner date and time.  Understanding was verbalized.  Pt and the sister appreciated the assistance.

## 2022-03-21 NOTE — PROGRESS NOTES
Subjective:     The patient location is: Nursing Home  The chief complaint leading to consultation is: CRT-D in situ    Visit type: audiovisual    Face to Face time with patient: 10 min  20 minutes of total time spent on the encounter, which includes face to face time and non-face to face time preparing to see the patient (eg, review of tests), Obtaining and/or reviewing separately obtained history, Documenting clinical information in the electronic or other health record, Independently interpreting results (not separately reported) and communicating results to the patient/family/caregiver, or Care coordination (not separately reported).     Each patient to whom he or she provides medical services by telemedicine is:  (1) informed of the relationship between the physician and patient and the respective role of any other health care provider with respect to management of the patient; and (2) notified that he or she may decline to receive medical services by telemedicine and may withdraw from such care at any time.    Notes:     HPI    Cardiologist: Edison Marshall MD    I had the pleasure of seeing Audrey Oneil  in follow-up for his history of ischemic cardipmyopathy and ICD in situ. He was last seen in EP clinic in 8/2019. He is a former patient of Dr. Taylor Armijo. He is a 71 year old male with a history of HTN, HLD, CAD status-post MI in the past, ischemic cardiomyopathy, PE, and LBBB, who underwent dual chamber ICD implantation in 2/2015, and upgrade to a [employer] CRT-D in 5/2019.     Mr. Oneil noted a marked improvement in his energy level following device upgrade.    In 12/2021 Mr. Oneil presented to [facility] with worsening SOB, then suffered a PEA arrest. He was critically ill, with a significant pressor requirement and attempted Impella (aborted due to significant PAD and tortuous aorta). He also required HD during the hospitalization. He was discharged to an LTAC facility. He is currently off HD and is no  longer ventilator-dependent. He remains on a dopamine drip.    I reviewed a 3/10/2022 device interrogation, which shows stable device and lead function. He is 99% biv paced. No arrhythmias identified. Three episodes of nonsustained RV oversensing on 3/8 and 3/9. Estimated battery longevity 5.6-6 years.      Review of Systems   Constitutional: Positive for malaise/fatigue. Negative for decreased appetite, weight gain and weight loss.   HENT: Negative for sore throat.    Eyes: Negative for blurred vision.   Cardiovascular: Positive for dyspnea on exertion. Negative for chest pain, irregular heartbeat, leg swelling, near-syncope, orthopnea, palpitations, paroxysmal nocturnal dyspnea and syncope.   Respiratory: Negative for shortness of breath.    Skin: Negative for rash.   Musculoskeletal: Negative for arthritis.   Gastrointestinal: Negative for abdominal pain.   Neurological: Negative for focal weakness.   Psychiatric/Behavioral: Negative for altered mental status.        Objective:        Physical exam not performed--telehealth visit    Assessment:       1. Cardiomyopathy, ischemic    2. H/O ventricular tachycardia    3. Long term current use of amiodarone         Plan:       In summary, Audrey Oneil Jr. is a 71 year old male with a history of ischemic cardiomyopathy who has a [employer] CRT-D device in place, recent PEA arrest resulting in prolonged hospitalization. The device is functioning appropriately, with 99% biv pacing and no arrhythmias identified. The plan is for regular device checks and to see me again in 1 year.    Thank you for allowing me to participate in the care of this patient. Please do not hesitate to call me with any questions or concerns.

## 2022-03-21 NOTE — PROGRESS NOTES
[employer] consulted re: NSRVO events noted via [email or web address].  Diego Contreras reviewed the case and recommended testing for myopotentials. Once confirmed, consider turning off the low frequency attenuation filter (LFA) on the device and monitor patient. Note that by turning off the LFA, we may increase the likelihood of T-wave oversensing.    This case has been reviewed in the past, but patient no showed several device clinic appts for testing and reprogramming.  Spoke to patient's sister who accepted an appointment on 3/31/22.

## 2022-03-24 NOTE — TELEPHONE ENCOUNTER
----- Message from Robina Chapman sent at 3/24/2022 11:05 AM CDT -----  Regarding: Appt  Contact: [phone]  Patients sister Diana is calling. Patient has a device check on 3/31 for 2pm and Card appt at 1:30. Would like to know if patient can be seen on the 31st also as she is his caregiver. . Please call patient at [phone]      Thank You

## 2022-03-24 NOTE — TELEPHONE ENCOUNTER
Called pt and left a msg that there is no opening on that particular day but can see any available provider if pt needs to be seen.

## 2022-03-28 NOTE — ED NOTES
"Audrey Oneil Jr., an 71 y.o. male presents to the ED presents to the ED for severe cramping, abd pain, constipation, weakness. Patient explains that he had a cardiac arrest in January and was put on hospice care. While at hospice, the pt states they forgot to "turn his defibrillator off". Currently on a dobutamine drip being infused through his R upper arm PICC line. Pt hypotensive in triage and promptly moved to a room. VSS.       Chief Complaint   Patient presents with    Abdominal Pain     Severe cramping on dobutamine drip, cardiac arrest in Jan     Review of patient's allergies indicates:   Allergen Reactions    Iodine containing multivitamin Swelling     itching    Keflex [cephalexin] Swelling     Eyes.  Tolerated multiple doses of zosyn and 1 dose of augmentin in 2015 and 2016, respectively    Peaches [peach (prunus persica)] Swelling     eyes    Shellfish containing products Swelling    Fig tree Swelling     itching    Tuberculin spenser test ppd Rash     Past Medical History:   Diagnosis Date    Acute hypoxemic respiratory failure 11/7/2015    Acute idiopathic gout of right elbow 9/23/2021    AICD (automatic cardioverter/defibrillator) present 12/13/2015      Anticoagulant long-term use     CHF (congestive heart failure)     Chronic anticoagulation 5/5/2016    Chronic combined systolic and diastolic heart failure 11/26/2012    EF 10-20% on ECHO 2013    Chronic gout 12/2/2019    Clotting disorder     Coronary artery disease involving native coronary artery of native heart without angina pectoris 11/26/2012    Cath 10- Stents patent non-obstructive disease Cath 11-12015 non-obstructive disease     COVID-19 08/2021    Had antibody infusion    Diverticulosis of colon     DVT (deep venous thrombosis), unspecified laterality 11/12/2015    Dysphonia 1/28/2018    Essential hypertension 11/15/2015    Fine motor impairment 2/2/2021    Hyperlipidemia     Hypertensive heart " disease with heart failure 5/5/2016    MI (myocardial infarction) 2009    x's 5    Nicotine abuse     Obesity 11/26/2012    Olecranon bursitis of right elbow 9/19/2021    Pulmonary embolism 2011    Renal disorder     LAVERNE    Right carpal tunnel syndrome 4/6/2018    Stented coronary artery 11/26/2012    LAD stent placed 10/17/2007     Trigger thumb of right hand 4/6/2018

## 2022-03-28 NOTE — ED PROVIDER NOTES
Encounter Date: 3/28/2022       History     Chief Complaint   Patient presents with    Abdominal Pain     Severe cramping on dobutamine drip, cardiac arrest in Jan     71-year-old male with history of CAD, CHF EF of 10-20% on chronic dobutamine drip, status post AICD, PE, hypertension, hyperlipidemia presenting to the ED with acute onset generalized abdominal pain that started earlier today when he was leaving his therapy.  He states it is all over his abdomen, has intermittent nausea but no vomiting.  Has had diarrhea over the past couple of days has been nonbloody.  Is been yellow in color, non watery, not foul-smelling.  He has no history of abdominal surgeries.  Of recent, patient had a cardiac arrest in January, was hospitalized during this incident.  He was blood on dialysis for this subsequently, was recently taken off over the last couple weeks.        Review of patient's allergies indicates:   Allergen Reactions    Iodine containing multivitamin Swelling     itching    Keflex [cephalexin] Swelling     Eyes.  Tolerated multiple doses of zosyn and 1 dose of augmentin in 2015 and 2016, respectively    Peaches [peach (prunus persica)] Swelling     eyes    Shellfish containing products Swelling    Fig tree Swelling     itching    Tuberculin spenser test ppd Rash     Past Medical History:   Diagnosis Date    Acute hypoxemic respiratory failure 11/7/2015    Acute idiopathic gout of right elbow 9/23/2021    AICD (automatic cardioverter/defibrillator) present 12/13/2015      Anticoagulant long-term use     CHF (congestive heart failure)     Chronic anticoagulation 5/5/2016    Chronic combined systolic and diastolic heart failure 11/26/2012    EF 10-20% on ECHO 2013    Chronic gout 12/2/2019    Clotting disorder     Coronary artery disease involving native coronary artery of native heart without angina pectoris 11/26/2012    Cath 10- Stents patent non-obstructive disease Cath 11-12015  non-obstructive disease     COVID-19 08/2021    Had antibody infusion    Diverticulosis of colon     DVT (deep venous thrombosis), unspecified laterality 11/12/2015    Dysphonia 1/28/2018    Essential hypertension 11/15/2015    Fine motor impairment 2/2/2021    Hyperlipidemia     Hypertensive heart disease with heart failure 5/5/2016    MI (myocardial infarction) 2009    x's 5    Nicotine abuse     Obesity 11/26/2012    Olecranon bursitis of right elbow 9/19/2021    Pulmonary embolism 2011    Renal disorder     LAVERNE    Right carpal tunnel syndrome 4/6/2018    Stented coronary artery 11/26/2012    LAD stent placed 10/17/2007     Trigger thumb of right hand 4/6/2018     Past Surgical History:   Procedure Laterality Date    APPENDECTOMY      BACK SURGERY      CARDIAC DEFIBRILLATOR PLACEMENT      CARDIAC SURGERY  2007    stent    CARPAL TUNNEL RELEASE Right 04/2018    INSERTION OF BIVENTRICULAR IMPLANTABLE CARDIOVERTER-DEFIBRILLATOR (ICD) N/A 5/3/2019    Procedure: INSERTION, ICD, BIVENTRICULAR;  Surgeon: Teofilo Pal MD;  Location: [facility] EP LAB;  Service: Cardiology;  Laterality: N/A;  ICH CM,  ICD UPGD BI-V,  SJM, MAC, FAS, 3PREP (dual ICD INSITU)    r knee scope      REVISION OF SKIN POCKET FOR CARDIOVERTER-DEFIBRILLATOR Left 5/3/2019    Procedure: Revision, Skin Pocket, For Cardioverter-Defibrillator;  Surgeon: Teofilo Pal MD;  Location: [facility] EP LAB;  Service: Cardiology;  Laterality: Left;    RIGHT HEART CATHETERIZATION Right 6/14/2021    Procedure: INSERTION, CATHETER, RIGHT HEART;  Surgeon: Lizz Nieto MD;  Location: [facility] CATH LAB;  Service: Cardiology;  Laterality: Right;    SPINE SURGERY      TONSILLECTOMY      TRIGGER FINGER RELEASE Right 04/2018    thumb     Family History   Problem Relation Age of Onset    Cancer Mother         colon cancer    Heart disease Mother     Diabetes Mother     Heart disease Father     Stroke Father     Colon polyps Father      Cancer Paternal Grandmother         leukemia    No Known Problems Sister     No Known Problems Daughter     No Known Problems Son     No Known Problems Sister     No Known Problems Son      Social History     Tobacco Use    Smoking status: Former Smoker     Packs/day: 1.00     Years: 45.00     Pack years: 45.00     Types: Cigarettes     Quit date: 2005     Years since quittin.2    Smokeless tobacco: Never Used    Tobacco comment: 1-1.5 ppd every day.   Substance Use Topics    Alcohol use: No    Drug use: No     Review of Systems   Constitutional: Negative for chills and fever.   HENT: Negative for sore throat.    Respiratory: Negative for cough and shortness of breath.    Cardiovascular: Negative for chest pain.   Gastrointestinal: Positive for abdominal pain, diarrhea, nausea and vomiting. Negative for blood in stool.   Genitourinary: Positive for dysuria.   Musculoskeletal: Negative for back pain.   Skin: Negative for rash.   Neurological: Negative for dizziness, weakness and light-headedness.   Hematological: Does not bruise/bleed easily.       Physical Exam     Initial Vitals [22 1631]   BP Pulse Resp Temp SpO2   (!) 79/45 96 20 99.1 °F (37.3 °C) 95 %      MAP       --         Physical Exam    Nursing note and vitals reviewed.  Constitutional: Vital signs are normal. He appears well-developed and well-nourished. He is not diaphoretic. He does not appear ill. No distress.   HENT:   Head: Normocephalic and atraumatic.   Eyes: Conjunctivae and EOM are normal. Right eye exhibits no discharge. Left eye exhibits no discharge. Right conjunctiva is not injected. Left conjunctiva is not injected. No scleral icterus.   Neck:   Normal range of motion.  Cardiovascular: Normal rate, regular rhythm, S1 normal and S2 normal. Exam reveals no gallop and no friction rub.    No murmur heard.  Pulmonary/Chest: No respiratory distress. He has no wheezes. He has no rhonchi. He has no rales. He exhibits no  tenderness.   Abdominal: Abdomen is soft. He exhibits no distension and no mass. There is abdominal tenderness.   Generalized abdominal tenderness to palpation with voluntary guarding. There is guarding. There is no rebound.   Musculoskeletal:         General: No edema.      Cervical back: Normal range of motion.     Neurological: He is alert.   Skin: Skin is warm and dry. No rash noted. No erythema. No pallor.         ED Course   Procedures  Labs Reviewed   CBC W/ AUTO DIFFERENTIAL - Abnormal; Notable for the following components:       Result Value    RBC 4.11 (*)     Hemoglobin 11.8 (*)     Hematocrit 37.3 (*)     MCHC 31.6 (*)     RDW 16.1 (*)     Immature Granulocytes 0.7 (*)     Immature Grans (Abs) 0.08 (*)     All other components within normal limits   COMPREHENSIVE METABOLIC PANEL - Abnormal; Notable for the following components:    Sodium 135 (*)     CO2 22 (*)     BUN 35 (*)     Creatinine 1.6 (*)     Albumin 2.9 (*)     eGFR if  50.1 (*)     eGFR if non  43.3 (*)     All other components within normal limits   URINALYSIS, REFLEX TO URINE CULTURE - Abnormal; Notable for the following components:    Appearance, UA Hazy (*)     Nitrite, UA Positive (*)     Leukocytes, UA 3+ (*)     All other components within normal limits    Narrative:     Specimen Source->Urine   B-TYPE NATRIURETIC PEPTIDE - Abnormal; Notable for the following components:     (*)     All other components within normal limits   TROPONIN I - Abnormal; Notable for the following components:    Troponin I 0.082 (*)     All other components within normal limits   PROCALCITONIN - Abnormal; Notable for the following components:    Procalcitonin 0.31 (*)     All other components within normal limits   URINALYSIS MICROSCOPIC - Abnormal; Notable for the following components:    RBC, UA 6 (*)     WBC, UA >100 (*)     WBC Clumps, UA Occasional (*)     Bacteria Moderate (*)     All other components within normal  limits    Narrative:     Specimen Source->Urine   ISTAT PROCEDURE - Abnormal; Notable for the following components:    POC BUN 37 (*)     POC Creatinine 1.7 (*)     POC Sodium 133 (*)     All other components within normal limits   CULTURE, BLOOD    Narrative:     Aerobic and anaerobic   CULTURE, BLOOD    Narrative:     Aerobic and anaerobic   CLOSTRIDIUM DIFFICILE   CULTURE, URINE   LACTIC ACID, PLASMA   LACTIC ACID, PLASMA   MAGNESIUM   PHOSPHORUS   LIPASE   SARS-COV-2 RDRP GENE    Narrative:     This test utilizes isothermal nucleic acid amplification   technology to detect the SARS-CoV-2 RdRp nucleic acid segment.   The analytical sensitivity (limit of detection) is 125 genome   equivalents/mL.   A POSITIVE result implies infection with the SARS-CoV-2 virus;   the patient is presumed to be contagious.     A NEGATIVE result means that SARS-CoV-2 nucleic acids are not   present above the limit of detection. A NEGATIVE result should be   treated as presumptive. It does not rule out the possibility of   COVID-19 and should not be the sole basis for treatment decisions.   If COVID-19 is strongly suspected based on clinical and exposure   history, re-testing using an alternate molecular assay should be   considered.   This test is only for use under the Food and Drug   Administration s Emergency Use Authorization (EUA).   Commercial kits are provided by [employer].   Performance characteristics of the EUA have been independently   verified by [facility] Department of   Pathology and Laboratory Medicine.   _________________________________________________________________   The authorized Fact Sheet for Healthcare Providers and the authorized Fact   Sheet for Patients of the ID NOW COVID-19 are available on the FDA   website:     https://www.fda.gov/media/646833/download  https://www.fda.gov/media/760885/download          ISTAT CHEM8          Imaging Results           CTA Abdomen and Pelvis (Final  result)  Result time 03/29/22 00:02:14    Final result by Denton Samayoa MD (03/29/22 00:02:14)                 Impression:      1. The celiac artery is patent without significant calcific atherosclerosis at its origin. The superior mesenteric artery is patent with mild calcific atherosclerosis at its origin. The inferior mesenteric artery is patent.  Small calcified right renal artery aneurysm, unchanged from 12/12/2021.  2. Left rectus sheath hematoma, does not cross midline or dissect fascial planes.  No contrast extravasation demonstrated to suggest active bleeding.  Recommend continued close follow-up to ensure resolution.  3. Kidneys demonstrate bilateral striated nephrograms and mild nonspecific perinephric stranding suggestive of acute tubular necrosis, hypotension, or pyelonephritis.  Suggest correlation with urinalysis and renal function.  4. Several diverticula in the descending and sigmoid colon with associated wall thickening, mild adjacent fat stranding, and prominence of the vasa recta.  Findings are slightly worse when compared with prior studies and may indicate early acute diverticulitis or colitis in the correct clinical setting.  Note that two separate areas involving the left colon, with a skip area of colon with no inflammatory changes between them, suggesting possible inflammatory colitis involving 2 separate areas in the left colon, more likely.  No perforation or abscess.  5. 2.9 cm multi cystic, lobulated lesion the pancreatic head/uncinate process, not definitely visualized on prior imaging likely related to differences in technique and may represent a pancreatic serous cystadenoma or IPMN.  Additional smaller 1.1 cm cyst within the pancreatic tail, possibly side branch IPMN.  No associated pancreatic ductal dilatation.  Further evaluation may be obtained with non emergent contrast enhanced MRI as clinically indicated.  6. Mild prostatomegaly.  Suggest correlation with PSA.  7.   Additional findings as above.  This report was flagged in Epic as abnormal.    Electronically signed by resident: Reinaldo Gracia  Date:    03/28/2022  Time:    22:21    Electronically signed by: eDnton Samayoa MD  Date:    03/29/2022  Time:    00:02             Narrative:    EXAMINATION:  CTA ABDOMEN AND PELVIS    CLINICAL HISTORY:  Mesenteric ischemia, acute;    TECHNIQUE:  Using 100 cc of  Omnipaque 350 IV contrast, and multi-detector helical CT technique, axial CT angiogram images of the abdomen were obtained from the lung bases through the pelvis.  Multiplanar reconstructions including MIP images were post processed for vessel analysis.  No oral contrast was given.    COMPARISON:  CT abdomen pelvis 09/23/2018. CTA chest 12/12/2021.    FINDINGS:  Heart: Mild cardiomegaly.  No pericardial effusion.  Partially visualized AICD leads.    Lung Bases: Symmetrically expanded and clear.  Small left-sided Bochdalek hernia noted, unchanged.    Liver: Normal in size and attenuation without focal hepatic abnormality.    Gallbladder: Normal appearance without evidence for cholecystitis.    Bile Ducts: No intra or extrahepatic biliary ductal dilation.    Pancreas: There is a 2.9 cm multi-cystic, lobulated lesion in the pancreatic head/uncinate process.  The individual cysts are small all measuring less than 1.5 cm (axial series 2, image 272).  Additional 1.1 cm cyst within the pancreatic tail (axial series 2, image 193).    Spleen: Normal in size.    Adrenals: Normal.    Genitourinary: Kidneys are mildly atrophic and demonstrate bilateral striated nephrograms and mild nonspecific perinephric stranding bilaterally.  No hydronephrosis.  No focal renal abnormality or nephrolithiasis.  Bladder demonstrates smooth contours without focal bladder wall thickening.    Reproductive organs: Prostate is mildly enlarged.    GI tract/Mesentery: Stomach is normal appearance.  Visualized loops of small and large bowel are normal in caliber  without evidence for inflammation or obstruction.  Several diverticula again descending and sigmoid colon with associated mild colonic wall thickening and adjacent fat stranding with prominence of the vasa recta.  No perforation or focal abscess formation.    Peritoneal Space: No abdominopelvic ascites or intraperitoneal free air.    Retroperitoneum: No significant adenopathy.    Abdominal wall: There is a 6 x 4 x 5 cm high attenuation collection within the left rectus abdominus muscle, likely hematoma.  The collection is confined to the rectus muscle and does not cross the midline dissect fascial planes.  No contrast extravasation demonstrated within the hematoma.  Suspected small fat containing left inguinal hernia.  Moderate size fat containing ventral hernia.    Vasculature: Abdominal aorta is normal in caliber with significant calcific atherosclerosis along its course.  The celiac artery is patent without significant calcific atherosclerosis at its origin.  The superior mesenteric artery is patent with mild calcific atherosclerosis at its origin.  The inferior mesenteric artery is patent.  The bilateral renal arteries are patent.  Small calcified right renal artery aneurysm, unchanged from 12/12/2021.  The common and external/internal iliac arteries are patent with calcific atherosclerosis along its course.    Bones: Degenerative changes without acute fracture or bony destructive process.                               X-Ray Chest AP Portable (Final result)  Result time 03/28/22 17:55:30    Final result by Devendra Clark MD (03/28/22 17:55:30)                 Impression:      Cardiac device and right-sided PICC line without detrimental change or radiographic acute intrathoracic process seen.  Specifically, no consolidation.      Electronically signed by: Devendra Clark MD  Date:    03/28/2022  Time:    17:55             Narrative:    EXAMINATION:  XR CHEST AP PORTABLE    CLINICAL  HISTORY:  Sepsis;    TECHNIQUE:  AP portable view of the chest.    COMPARISON:  Chest radiograph 01/16/2022 and CT thorax 12/12/2021    FINDINGS:  Monitoring leads overlie the chest.  Patient is slightly rotated.  Resolution is somewhat limited by body habitus with underpenetration.    Interval placement of right-sided PICC line with tip overlying the SVC.  Left upper chest multi lead cardiac device appears stable.  Cardiomediastinal silhouette is midline and prominent with calcification and tortuosity of the aorta and enlarged cardiac silhouette grossly similar to prior.  Pulmonary vasculature and hilar contours are within normal limits.  Few scattered linear opacities throughout the lungs consistent with minimal platelike scarring versus atelectasis.  The lungs are otherwise well expanded without large consolidation, pleural effusion or pneumothorax definitively seen.  No acute osseous process seen.  PA and lateral views can be obtained.                                 Medications   levoFLOXacin 750 mg/150 mL IVPB 750 mg (0 mg Intravenous Stopped 3/28/22 6191)   allopurinoL tablet 200 mg (has no administration in time range)   amiodarone tablet 300 mg (has no administration in time range)   aspirin EC tablet 81 mg (has no administration in time range)   atorvastatin tablet 80 mg (has no administration in time range)   furosemide tablet 40 mg (has no administration in time range)   metoprolol succinate (TOPROL-XL) 24 hr tablet 50 mg (has no administration in time range)   potassium chloride SA CR tablet 20 mEq (has no administration in time range)   sacubitriL-valsartan 49-51 mg per tablet 1 tablet (1 tablet Oral Given 3/29/22 1113)   sodium chloride 0.9% flush 10 mL (has no administration in time range)   naloxone 0.4 mg/mL injection 0.02 mg (has no administration in time range)   glucose chewable tablet 16 g (has no administration in time range)   glucose chewable tablet 24 g (has no administration in time range)    glucagon (human recombinant) injection 1 mg (has no administration in time range)   heparin (porcine) injection 5,000 Units (5,000 Units Subcutaneous Given 3/29/22 0708)   albuterol-ipratropium 2.5 mg-0.5 mg/3 mL nebulizer solution 3 mL (has no administration in time range)   melatonin tablet 6 mg (has no administration in time range)   simethicone chewable tablet 80 mg (has no administration in time range)   mupirocin 2 % ointment (has no administration in time range)   dextrose 10% bolus 125 mL (has no administration in time range)   dextrose 10% bolus 250 mL (has no administration in time range)   DOBUtamine 1000 mg in D5W 250 mL infusion (2.5 mcg/kg/min × 95.7 kg Intravenous New Bag 3/29/22 0705)   oxyCODONE-acetaminophen 5-325 mg per tablet 1 tablet (1 tablet Oral Given 3/29/22 0358)   alteplase injection 2 mg (has no administration in time range)   metronidazole IVPB 500 mg (has no administration in time range)   morphine injection 4 mg (4 mg Intravenous Given 3/28/22 2018)   iohexoL (OMNIPAQUE 350) injection 100 mL (100 mLs Intravenous Given 3/28/22 2154)   metroNIDAZOLE tablet 500 mg (500 mg Oral Given 3/29/22 0205)     Medical Decision Making:   History:   Old Medical Records: I decided to obtain old medical records.  Initial Assessment:   71-year-old chronically ill male with history of severe CAD with EF of 10-20% on chronic dobutamine drip and AICD placement as well as hypertension, hyperlipidemia, PE, recent cardiac arrest presenting to the ED with generalized abdominal pain that started acutely earlier today.  Also has associated nausea without any vomiting.  Has had diarrhea last couple days.  Has generalized abdominal tenderness to palpation with voluntary guarding.    Differential includes but is not limited to acute mesenteric ischemia, appendicitis, cholecystitis, choledocholithiasis, biliary colic, pancreatitis, UTI, pyelonephritis, renal colic, nephrolithiasis.    On arrival, patient was  hypotensive with blood pressures 70s over 40s.  However, after patient got into room, patient blood pressure soft/normotensive.  Deferred fluid resuscitation due to patient having EF of 10-20%, has pacemaker and is on chronic dobutamine drip.  I suspect that he if we fluid resuscitate him he has high risk of decompensation due to fluid overload.  Will give him more fluids if he becomes more hypotensive or has worsening soft BPs.  High concern for sepsis with intra-abdominal source as patient's main complaints of intra-abdominal pain.  CBC without leukocytosis or anemia.  CMP showed LAVERNE with creatinine of 1.6 from baseline 1.0.  Not other electrolyte abnormalities.  UA showed concern for UTI.  Will treat with levofloxacin as patient is allergic to Keflex.  Lactic within normal limits.  Troponin mildly elevated compared to baseline, EKG showed no concern for ischemia.  Doubt ACS.  CTA abdomen pelvis performed concerning for mesenteric ischemia.  Pending results, and likely admission patient handed off to Dr. Ureña and Dr. Infante for further management.  Independently Interpreted Test(s):   I have ordered and independently interpreted X-rays - see summary below.       <> Summary of X-Ray Reading(s): No pneumonia, pneumothorax, or pleural effusion noted on CXR    I have ordered and independently interpreted EKG Reading(s) - see summary below       <> Summary of EKG Reading(s): Normal sinus rhythm, rate of 97. No ST elevations or depressions concerning for ischemia.  No STEMI per my independent interpretation.  Clinical Tests:   Lab Tests: Ordered and Reviewed  Radiological Study: Ordered and Reviewed  Medical Tests: Ordered            Attending Attestation:   Physician Attestation Statement for Resident:  As the supervising MD   Physician Attestation Statement: I have personally seen and examined this patient.   I agree with the above history. -: abd pain   As the supervising MD I agree with the above PE.    As the  supervising MD PATTON agree with the above treatment, course, plan, and disposition.                         Clinical Impression:   Final diagnoses:  [R68.89] Multiple complaints  [R10.84] Generalized abdominal pain  [R11.0] Nausea  [R19.7] Diarrhea, unspecified type  [N12] Pyelonephritis (Primary)          ED Disposition Condition    Admit               Avelino Olivarez MD  Resident  03/28/22 [id number]       Avelino Olivarez MD  Resident  03/28/22 7728       Peyman Burciaga III, MD  03/29/22 7696

## 2022-03-29 PROBLEM — A41.9 SEVERE SEPSIS: Status: ACTIVE | Noted: 2022-01-01

## 2022-03-29 PROBLEM — R65.20 SEVERE SEPSIS: Status: ACTIVE | Noted: 2022-01-01

## 2022-03-29 PROBLEM — K57.92 DIVERTICULITIS: Status: ACTIVE | Noted: 2022-01-01

## 2022-03-29 NOTE — HPI
Mr. Oneil is a 71 M with PMHx of CAD, HFrEF (10-20%) on chronic dobutamine gtt, s/p AICD, h/o PE, HTN, HLD who [place] [facility] [place] acute generalized abdominal pain that started this afternoon after leaving therapy. Describes pain as dull, non radiating, generalized, 9/10 in severity. Complains of associated nausea but no episodes of emesis. Reports not having a bowel movement since Saturday, but previously having diarrhea with intermittent occurences of benoit sized stool beforehand. Denies prior episode of similar abdominal pain. No history of abdominal surgeries. Of note, Patient had a cardiac arrest in January, 2022.     ED Course: Hypotensive ( 79/45 mmHg) on arrival, otherwise vitals stable. UA consistent with UTI. No leukocytosis on labs. Cr 1.6. Trop 0.082. . Hgb 11.8. CXR negative for acute cardiopulmonary process. CT A/P concerning for diverticulitis and Pyelo vs. ATN. Started on levofloxacin for UTI coverage given allergy to keflex. Started on metro for empiric treatment of diverticulitis

## 2022-03-29 NOTE — H&P
[facility] ([address] Medicine  History & Physical    Patient Name: Audrey Oneil Jr.  MRN: [id number]  Patient Class: IP- Inpatient  Admission Date: 3/28/2022  Attending Physician: Nicole Alonso MD   Primary Care Provider: January Khan MD         Patient information was obtained from patient, past medical records and ER records.     Subjective:     Principal Problem:Severe sepsis    Chief Complaint:   Chief Complaint   Patient presents with    Abdominal Pain     Severe cramping on dobutamine drip, cardiac arrest in Jan        HPI: Mr. Oneil is a 71 M with PMHx of CAD, HFrEF (10-20%) on chronic dobutamine gtt, s/p AICD, h/o PE, HTN, HLD who presents to [facility] ED with acute generalized abdominal pain that started this afternoon after leaving therapy. Describes pain as dull, non radiating, generalized, 9/10 in severity. Complains of associated nausea but no episodes of emesis. Reports not having a bowel movement since Saturday, but previously having diarrhea with intermittent occurences of benoit sized stool beforehand. Denies prior episode of similar abdominal pain. No history of abdominal surgeries. Of note, Patient had a cardiac arrest in January, 2022.     ED Course: Hypotensive ( 79/45 mmHg) on arrival, otherwise vitals stable. UA consistent with UTI. No leukocytosis on labs. Cr 1.6. Trop 0.082. . Hgb 11.8. CXR negative for acute cardiopulmonary process. CT A/P concerning for diverticulitis and Pyelo vs. ATN. Started on levofloxacin for UTI coverage given allergy to keflex. Started on metro for empiric treatment of diverticulitis       Past Medical History:   Diagnosis Date    Acute hypoxemic respiratory failure 11/7/2015    Acute idiopathic gout of right elbow 9/23/2021    AICD (automatic cardioverter/defibrillator) present 12/13/2015      Anticoagulant long-term use     CHF (congestive heart failure)     Chronic anticoagulation 5/5/2016    Chronic combined  systolic and diastolic heart failure 11/26/2012    EF 10-20% on ECHO 2013    Chronic gout 12/2/2019    Clotting disorder     Coronary artery disease involving native coronary artery of native heart without angina pectoris 11/26/2012    Cath 10- Stents patent non-obstructive disease Cath 11-12015 non-obstructive disease     COVID-19 08/2021    Had antibody infusion    Diverticulosis of colon     DVT (deep venous thrombosis), unspecified laterality 11/12/2015    Dysphonia 1/28/2018    Essential hypertension 11/15/2015    Fine motor impairment 2/2/2021    Hyperlipidemia     Hypertensive heart disease with heart failure 5/5/2016    MI (myocardial infarction) 2009    x's 5    Nicotine abuse     Obesity 11/26/2012    Olecranon bursitis of right elbow 9/19/2021    Pulmonary embolism 2011    Renal disorder     LAVERNE    Right carpal tunnel syndrome 4/6/2018    Stented coronary artery 11/26/2012    LAD stent placed 10/17/2007     Trigger thumb of right hand 4/6/2018       Past Surgical History:   Procedure Laterality Date    APPENDECTOMY      BACK SURGERY      CARDIAC DEFIBRILLATOR PLACEMENT      CARDIAC SURGERY  2007    stent    CARPAL TUNNEL RELEASE Right 04/2018    INSERTION OF BIVENTRICULAR IMPLANTABLE CARDIOVERTER-DEFIBRILLATOR (ICD) N/A 5/3/2019    Procedure: INSERTION, ICD, BIVENTRICULAR;  Surgeon: Teofilo Pal MD;  Location: [facility] EP LAB;  Service: Cardiology;  Laterality: N/A;  ICH CM,  ICD UPGD BI-V,  SJM, MAC, FAS, 3PREP (dual ICD INSITU)    r knee scope      REVISION OF SKIN POCKET FOR CARDIOVERTER-DEFIBRILLATOR Left 5/3/2019    Procedure: Revision, Skin Pocket, For Cardioverter-Defibrillator;  Surgeon: Teofilo Pal MD;  Location: [facility] EP LAB;  Service: Cardiology;  Laterality: Left;    RIGHT HEART CATHETERIZATION Right 6/14/2021    Procedure: INSERTION, CATHETER, RIGHT HEART;  Surgeon: Lizz Nieto MD;  Location: [facility] CATH LAB;  Service: Cardiology;  Laterality:  Right;    SPINE SURGERY      TONSILLECTOMY      TRIGGER FINGER RELEASE Right 04/2018    thumb       Review of patient's allergies indicates:   Allergen Reactions    Iodine containing multivitamin Swelling     itching    Keflex [cephalexin] Swelling     Eyes.  Tolerated multiple doses of zosyn and 1 dose of augmentin in 2015 and 2016, respectively    Peaches [peach (prunus persica)] Swelling     eyes    Shellfish containing products Swelling    Fig tree Swelling     itching    Tuberculin spenser test ppd Rash       No current facility-administered medications on file prior to encounter.     Current Outpatient Medications on File Prior to Encounter   Medication Sig    allopurinoL (ZYLOPRIM) 100 MG tablet Take 2 tablets (200 mg total) by mouth once daily.    amiodarone (PACERONE) 200 MG Tab TAKE 1 AND 1/2 TABLETS(300 MG) BY MOUTH EVERY DAY (Patient not taking: Reported on 3/18/2022)    aspirin (ECOTRIN) 81 MG EC tablet Take 1 tablet (81 mg total) by mouth once daily.    atorvastatin (LIPITOR) 80 MG tablet Take 1 tablet (80 mg total) by mouth once daily. (Patient not taking: Reported on 3/18/2022)    clopidogreL (PLAVIX) 75 mg tablet Take 1 tablet (75 mg total) by mouth once daily. (Patient not taking: Reported on 3/18/2022)    colchicine (COLCRYS) 0.6 mg tablet Take 1 tablet (0.6 mg total) by mouth once daily. (Patient not taking: Reported on 3/18/2022)    DOBUTamine (DOBUTREX) 500 mg/250 mL (2,000 mcg/mL) Inject 188-940 mcg/min into the vein. 100mg    docusate sodium (COLACE) 100 MG capsule Take 1 capsule (100 mg total) by mouth 2 (two) times daily.    furosemide (LASIX) 40 MG tablet Take 1 tablet (40 mg total) by mouth once daily.    ipratropium (ATROVENT) 0.02 % nebulizer solution Take 2.5 mLs (500 mcg total) by nebulization 4 (four) times daily as needed for Wheezing. Rescue    metoprolol succinate (TOPROL-XL) 50 MG 24 hr tablet Take 1 tablet (50 mg total) by mouth once daily.    midodrine  (PROAMATINE) 2.5 MG Tab Take 1 tablet (2.5 mg total) by mouth 3 (three) times daily.    mineral oil-hydrophil petrolat (AQUAPHOR) Oint Apply topically once daily.    ondansetron (ZOFRAN-ODT) 4 MG TbDL Take 1 tablet (4 mg total) by mouth every 6 (six) hours as needed (nausea).    oxyCODONE-acetaminophen (PERCOCET) 5-325 mg per tablet Take 1 tablet by mouth every 6 (six) hours as needed for Pain.    pantoprazole (PROTONIX) 40 MG tablet Take 40 mg by mouth.    paricalcitoL (ZEMPLAR) 1 MCG capsule Take 1 capsule (1 mcg total) by mouth once daily.    polyethylene glycol (GLYCOLAX) 17 gram PwPk Take 17 g by mouth 2 (two) times daily.    potassium chloride SA (K-DUR,KLOR-CON) 20 MEQ tablet Take 1 tablet (20 mEq total) by mouth Daily. TAKE 1 TABLET(20 MEQ) BY MOUTH daily  with lasix    sacubitril/valsartan (ENTRESTO ORAL) Take 1 tablet by mouth 2 (two) times a day.    sennosides (SENNA) 8.6 mg Cap Take 1 capsule by mouth once daily.    simethicone (MYLICON) 80 MG chewable tablet Take 1 tablet (80 mg total) by mouth every 6 (six) hours as needed for Flatulence.    wound dressings Pste Apply 1 application topically.     Family History       Problem Relation (Age of Onset)    Cancer Mother, Paternal Grandmother    Colon polyps Father    Diabetes Mother    Heart disease Mother, Father    No Known Problems Sister, Daughter, Son, Sister, Son    Stroke Father          Tobacco Use    Smoking status: Former Smoker     Packs/day: 1.00     Years: 45.00     Pack years: 45.00     Types: Cigarettes     Quit date: 2005     Years since quittin.2    Smokeless tobacco: Never Used    Tobacco comment: 1-1.5 ppd every day.   Substance and Sexual Activity    Alcohol use: No    Drug use: No    Sexual activity: Never     Review of Systems   Constitutional:  Negative for chills, diaphoresis and fever.   HENT:  Negative for postnasal drip, sinus pressure and trouble swallowing.    Eyes:  Negative for visual disturbance.    Respiratory:  Negative for shortness of breath.    Cardiovascular:  Negative for chest pain and palpitations.   Gastrointestinal:  Positive for abdominal pain, constipation, diarrhea and nausea. Negative for vomiting.   Genitourinary:  Negative for dysuria.   Musculoskeletal:  Negative for back pain.   Skin:  Negative for rash.   Allergic/Immunologic: Negative for immunocompromised state.   Neurological:  Negative for syncope and headaches.   Hematological:  Does not bruise/bleed easily.   Psychiatric/Behavioral:  Negative for confusion and decreased concentration.    Objective:     Vital Signs (Most Recent):  Temp: 98.8 °F (37.1 °C) (03/29/22 0330)  Pulse: 83 (03/29/22 0456)  Resp: 16 (03/29/22 0358)  BP: 125/62 (03/29/22 0330)  SpO2: (!) 91 % (03/29/22 0330)   Vital Signs (24h Range):  Temp:  [98.8 °F (37.1 °C)-99.1 °F (37.3 °C)] 98.8 °F (37.1 °C)  Pulse:  [83-96] 83  Resp:  [15-20] 16  SpO2:  [91 %-98 %] 91 %  BP: ()/(45-69) 125/62     Weight: 95.7 kg (211 lb)  Body mass index is 33.05 kg/m².    Physical Exam  Vitals and nursing note reviewed.   Constitutional:       Appearance: He is obese.   HENT:      Head: Normocephalic and atraumatic.      Nose: Nose normal.      Mouth/Throat:      Mouth: Mucous membranes are moist.      Pharynx: No oropharyngeal exudate or posterior oropharyngeal erythema.   Eyes:      General: No scleral icterus.     Extraocular Movements: Extraocular movements intact.      Conjunctiva/sclera: Conjunctivae normal.      Pupils: Pupils are equal, round, and reactive to light.   Cardiovascular:      Rate and Rhythm: Normal rate and regular rhythm.      Pulses: Normal pulses.      Heart sounds: Normal heart sounds. No murmur heard.    No friction rub. No gallop.   Pulmonary:      Effort: Pulmonary effort is normal.      Breath sounds: Normal breath sounds. No wheezing or rhonchi.   Abdominal:      General: Bowel sounds are normal. There is no distension.      Palpations: Abdomen is  soft.      Tenderness: There is no abdominal tenderness. There is no right CVA tenderness, left CVA tenderness or guarding.   Musculoskeletal:         General: Normal range of motion.      Cervical back: Normal range of motion.      Right lower leg: No edema.      Left lower leg: No edema.   Skin:     General: Skin is warm and dry.      Capillary Refill: Capillary refill takes less than 2 seconds.      Findings: No bruising.   Neurological:      General: No focal deficit present.      Mental Status: He is alert and oriented to person, place, and time.   Psychiatric:         Mood and Affect: Mood normal.         Behavior: Behavior normal.         Thought Content: Thought content normal.         Judgment: Judgment normal.         CRANIAL NERVES     CN III, IV, VI   Pupils are equal, round, and reactive to light.     Significant Labs: All pertinent labs within the past 24 hours have been reviewed.  Blood Culture:   Recent Labs   Lab 03/28/22  1659   LABBLOO No Growth to date  No Growth to date     CBC:   Recent Labs   Lab 03/28/22  1658 03/28/22  1700 03/29/22  0406   WBC  --  11.10 8.21   HGB  --  11.8* 10.9*   HCT 37 37.3* 34.7*   PLT  --  293 277     CMP:   Recent Labs   Lab 03/28/22  1700 03/29/22  0406   * 135*   K 4.4 4.4   CL 99 102   CO2 22* 21*    82   BUN 35* 29*   CREATININE 1.6* 1.3   CALCIUM 9.8 10.1   PROT 6.7 6.3   ALBUMIN 2.9* 2.6*   BILITOT 0.5 0.4   ALKPHOS 98 93   AST 16 13   ALT 10 7*   ANIONGAP 14 12   EGFRNONAA 43.3* 55.7*     Troponin:   Recent Labs   Lab 03/28/22  1700   TROPONINI 0.082*     Urine Culture: No results for input(s): LABURIN in the last 48 hours.  Urine Studies:   Recent Labs   Lab 03/28/22 1658   COLORU Yellow   APPEARANCEUA Hazy*   PHUR 5.0   SPECGRAV 1.015   PROTEINUA Negative   GLUCUA Negative   KETONESU Negative   BILIRUBINUA Negative   OCCULTUA Negative   NITRITE Positive*   LEUKOCYTESUR 3+*   RBCUA 6*   WBCUA >100*   BACTERIA Moderate*   SQUAMEPITHEL 0        Significant Imaging: I have reviewed all pertinent imaging results/findings within the past 24 hours.    Imaging Results               CTA Abdomen and Pelvis (Final result)  Result time 03/29/22 00:02:14      Final result by Denton Samayoa MD (03/29/22 00:02:14)                   Impression:      1. The celiac artery is patent without significant calcific atherosclerosis at its origin. The superior mesenteric artery is patent with mild calcific atherosclerosis at its origin. The inferior mesenteric artery is patent.  Small calcified right renal artery aneurysm, unchanged from 12/12/2021.  2. Left rectus sheath hematoma, does not cross midline or dissect fascial planes.  No contrast extravasation demonstrated to suggest active bleeding.  Recommend continued close follow-up to ensure resolution.  3. Kidneys demonstrate bilateral striated nephrograms and mild nonspecific perinephric stranding suggestive of acute tubular necrosis, hypotension, or pyelonephritis.  Suggest correlation with urinalysis and renal function.  4. Several diverticula in the descending and sigmoid colon with associated wall thickening, mild adjacent fat stranding, and prominence of the vasa recta.  Findings are slightly worse when compared with prior studies and may indicate early acute diverticulitis or colitis in the correct clinical setting.  Note that two separate areas involving the left colon, with a skip area of colon with no inflammatory changes between them, suggesting possible inflammatory colitis involving 2 separate areas in the left colon, more likely.  No perforation or abscess.  5. 2.9 cm multi cystic, lobulated lesion the pancreatic head/uncinate process, not definitely visualized on prior imaging likely related to differences in technique and may represent a pancreatic serous cystadenoma or IPMN.  Additional smaller 1.1 cm cyst within the pancreatic tail, possibly side branch IPMN.  No associated pancreatic ductal  dilatation.  Further evaluation may be obtained with non emergent contrast enhanced MRI as clinically indicated.  6. Mild prostatomegaly.  Suggest correlation with PSA.  7.  Additional findings as above.  This report was flagged in Epic as abnormal.    Electronically signed by resident: Reinaldo Gracia  Date:    03/28/2022  Time:    22:21    Electronically signed by: Denton Samayoa MD  Date:    03/29/2022  Time:    00:02               Narrative:    EXAMINATION:  CTA ABDOMEN AND PELVIS    CLINICAL HISTORY:  Mesenteric ischemia, acute;    TECHNIQUE:  Using 100 cc of  Omnipaque 350 IV contrast, and multi-detector helical CT technique, axial CT angiogram images of the abdomen were obtained from the lung bases through the pelvis.  Multiplanar reconstructions including MIP images were post processed for vessel analysis.  No oral contrast was given.    COMPARISON:  CT abdomen pelvis 09/23/2018. CTA chest 12/12/2021.    FINDINGS:  Heart: Mild cardiomegaly.  No pericardial effusion.  Partially visualized AICD leads.    Lung Bases: Symmetrically expanded and clear.  Small left-sided Bochdalek hernia noted, unchanged.    Liver: Normal in size and attenuation without focal hepatic abnormality.    Gallbladder: Normal appearance without evidence for cholecystitis.    Bile Ducts: No intra or extrahepatic biliary ductal dilation.    Pancreas: There is a 2.9 cm multi-cystic, lobulated lesion in the pancreatic head/uncinate process.  The individual cysts are small all measuring less than 1.5 cm (axial series 2, image 272).  Additional 1.1 cm cyst within the pancreatic tail (axial series 2, image 193).    Spleen: Normal in size.    Adrenals: Normal.    Genitourinary: Kidneys are mildly atrophic and demonstrate bilateral striated nephrograms and mild nonspecific perinephric stranding bilaterally.  No hydronephrosis.  No focal renal abnormality or nephrolithiasis.  Bladder demonstrates smooth contours without focal bladder wall  thickening.    Reproductive organs: Prostate is mildly enlarged.    GI tract/Mesentery: Stomach is normal appearance.  Visualized loops of small and large bowel are normal in caliber without evidence for inflammation or obstruction.  Several diverticula again descending and sigmoid colon with associated mild colonic wall thickening and adjacent fat stranding with prominence of the vasa recta.  No perforation or focal abscess formation.    Peritoneal Space: No abdominopelvic ascites or intraperitoneal free air.    Retroperitoneum: No significant adenopathy.    Abdominal wall: There is a 6 x 4 x 5 cm high attenuation collection within the left rectus abdominus muscle, likely hematoma.  The collection is confined to the rectus muscle and does not cross the midline dissect fascial planes.  No contrast extravasation demonstrated within the hematoma.  Suspected small fat containing left inguinal hernia.  Moderate size fat containing ventral hernia.    Vasculature: Abdominal aorta is normal in caliber with significant calcific atherosclerosis along its course.  The celiac artery is patent without significant calcific atherosclerosis at its origin.  The superior mesenteric artery is patent with mild calcific atherosclerosis at its origin.  The inferior mesenteric artery is patent.  The bilateral renal arteries are patent.  Small calcified right renal artery aneurysm, unchanged from 12/12/2021.  The common and external/internal iliac arteries are patent with calcific atherosclerosis along its course.    Bones: Degenerative changes without acute fracture or bony destructive process.                                       X-Ray Chest AP Portable (Final result)  Result time 03/28/22 17:55:30      Final result by Devendra Clark MD (03/28/22 17:55:30)                   Impression:      Cardiac device and right-sided PICC line without detrimental change or radiographic acute intrathoracic process seen.  Specifically, no  consolidation.      Electronically signed by: Devendra Clark MD  Date:    03/28/2022  Time:    17:55               Narrative:    EXAMINATION:  XR CHEST AP PORTABLE    CLINICAL HISTORY:  Sepsis;    TECHNIQUE:  AP portable view of the chest.    COMPARISON:  Chest radiograph 01/16/2022 and CT thorax 12/12/2021    FINDINGS:  Monitoring leads overlie the chest.  Patient is slightly rotated.  Resolution is somewhat limited by body habitus with underpenetration.    Interval placement of right-sided PICC line with tip overlying the SVC.  Left upper chest multi lead cardiac device appears stable.  Cardiomediastinal silhouette is midline and prominent with calcification and tortuosity of the aorta and enlarged cardiac silhouette grossly similar to prior.  Pulmonary vasculature and hilar contours are within normal limits.  Few scattered linear opacities throughout the lungs consistent with minimal platelike scarring versus atelectasis.  The lungs are otherwise well expanded without large consolidation, pleural effusion or pneumothorax definitively seen.  No acute osseous process seen.  PA and lateral views can be obtained.                                        Assessment/Plan:     * Severe sepsis  71 M admitted with urosepsis. Associated symptoms include generalized abdominal. HD stable. QSOFA: hypotension (sBP < 100). WBC 11. Lactate 1.8. UA consistent with UTI. CXR negative. CT A/P showed diverticulitis along with concern for ATN vs. Pyelonephritis.   Patient has a chronic PICC line placed for dobutamine infusion that was clean, dry, without signs of infection.     DDx: urosepsis, pyelonephritis, acute mesenteric ischemia, bacteremia, diverticulitis. Lower concern for pyelonephritis given no leukocytosis and constitutional symptoms prior to admission. Less concerned for acute mesenteric ischemia given no pain on PO intake along with decrease in generalized pain since presentation to ED.       Plan:  - Received levofloxacin  750 mg x 1 dose in ED. Will continue on admission with plan to adjust based on speciation and sensitivities from urine culture.   - Telemetry  - F/u BCx, UCx  - Strict I/Os  - Goal MAP >65       Diverticulitis  Pt with generalized abdominal pain on exam without TTP on exam.   Started on empiric treatment with metronidazole in ED    CT A/P showed Several diverticula in the descending and sigmoid colon with associated wall thickening, mild adjacent fat stranding, and prominence of the vasa recta.  Findings are slightly worse when compared with prior studies and may indicate early acute diverticulitis or colitis in the correct clinical setting.  Note that two separate areas involving the left colon, with a skip area of colon with no inflammatory changes between them, suggesting possible inflammatory colitis involving 2 separate areas in the left colon, more likely.    Plan:   -Metronidazole 500 q8hrs with serial abdominal exams.   -Low threshold to consult GI for further evaluation given CT A/P showing concern for developing inflammatory colitis         Essential hypertension  Continue home medications       Chronic gout  Continue home allopurinol 200mg      Elevated troponin I level  Pt with chronically elevated troponin   Troponin 0.082 on admission   EKG with no signs of acute ischemic change  No complaints of CP/discomfort     Plan  -STAT EKG, troponin if chest pain arises      Chronic combined systolic and diastolic congestive heart failure  Echo 9/21 showed EF 10-20%  Home meds: K 20mEq daily, furosemide 40mg qd, met succ 50mg, entresto 49-51mg   CXR negative for cardiogenic pulmonary edema       Plan:  - Furosemide 40mg PO daily as patient is euvolemic   - Daily weights (standing if tolerated)  - Strict I/Os  - Fluid restriction at 1500mL  - Cardiac diet  - potassium 20mEq daily   - Daily CMP  - Verify with pharmacy correct dose of entresto. Patient unsure if increased to highest dose       Coronary  artery disease involving native coronary artery of native heart without angina pectoris  Continue home ASA and statin         VTE Risk Mitigation (From admission, onward)         Ordered     heparin (porcine) injection 5,000 Units  Every 8 hours         03/29/22 0136     IP VTE HIGH RISK PATIENT  Once         03/29/22 0136     Place sequential compression device  Until discontinued         03/29/22 0136                   John Arteaga MD  Department of Hospital Medicine   [facility] - Intensive Care ([address] [place])

## 2022-03-29 NOTE — PT/OT/SLP EVAL
Occupational Therapy   Co-Evaluation/Treatment    Name: Audrey Oneil Jr.  MRN: [id number]  Admitting Diagnosis:  Severe sepsis  Recent Surgery: * No surgery found *      Recommendations:     Discharge Recommendations: nursing facility, skilled  Discharge Equipment Recommendations:  shower chair  Barriers to discharge:       Assessment:     Audrey Oneil Jr. is a 71 y.o. male with a medical diagnosis of Severe sepsis.  He presents with the following performance deficits affecting function: weakness, impaired endurance, impaired self care skills, impaired functional mobilty, gait instability, impaired balance, decreased upper extremity function, decreased lower extremity function, decreased safety awareness.      Pt agreeable to therapy and tolerated the session well. He required total assist to don socks and Min assist to don a gown. Pt ambulated 25 ft to the chair with CGA and RW. Prior to admission, Pt was receiving rehab services at a post acute placement. He was home for 2 days before coming [place] [facility] [place]. Pt would benefit from continued skilled acute OT services in order to maximize independence and safety with ADLs and functional mobility to ensure safe return to PLOF in the least restrictive environment. OT recommending SNF (may progress) once pt is medically appropriate for d/c.       Rehab Prognosis: Good; patient would benefit from acute skilled OT services to address these deficits and reach maximum level of function.       Plan:     Patient to be seen 3 x/week to address the above listed problems via self-care/home management, therapeutic activities, therapeutic exercises  · Plan of Care Expires: 04/29/22  · Plan of Care Reviewed with: patient    Subjective     Chief Complaint: Pain in abdomen   Patient/Family Comments/goals: To return to PLOF     Occupational Profile:  Living Environment: Pt lives in a [facility] with 4 SALEEM with his sister, brother in law, and their children. He was recently discharged from rehab for  2 days [place] [facility] [place] He has a tub/shower combo.   Previous level of function: Pt reports that he used a RW with modified independence. He states his girlfriend was assisting with bathing and dressing and his sister's children were helping carry him up the stairs.   Equipment Used at Home:  wheelchair, walker, rolling, bedside commode  Assistance upon Discharge: Will have assistance from the family that he lives with.     Pain/Comfort:  · Pain Rating 1: 5/10  · Location - Side 1: Bilateral  · Location - Orientation 1: generalized  · Location 1: abdomen  · Pain Addressed 1: Reposition, Distraction, Cessation of Activity  · Pain Rating Post-Intervention 1: other (see comments) (not rated)    Patients cultural, spiritual, Christian conflicts given the current situation: no    Objective:     Co-evaluation/treatment performed due to patient's multiple deficits requiring two skilled therapists to appropriately and safely assess patient's strength and endurance while facilitating functional tasks in addition to accommodating for patient's activity tolerance.       Communicated with: RN prior to session.  Patient found HOB elevated with telemetry, pulse ox (continuous) upon OT entry to room.    General Precautions: Standard, fall   Orthopedic Precautions:N/A   Braces: N/A  Respiratory Status: Room air    Occupational Performance:    Bed Mobility:    · Patient completed Rolling/Turning to Left with  supervision  · Patient completed Scooting/Bridging with stand by assistance  · Patient completed Supine to Sit with stand by assistance    Functional Mobility/Transfers:  · Patient completed Sit <> Stand Transfer with contact guard assistance  with  rolling walker   · Functional Mobility: Pt engaging in functional mobility to simulate household/community distances approx 25 ft with CGA and utilizing RW in order to maximize functional activity tolerance and standing balance required for engagement in occupations of  choice.    Activities of Daily Living:  · Upper Body Dressing: minimum assistance : To don gown around the front   · Lower Body Dressing: total assistance : To don B socks     Cognitive/Visual Perceptual:  Cognitive/Psychosocial Skills:     -       Oriented to: Person, Place, Time and Situation   -       Follows Commands/attention:Follows multistep  commands  -       Safety awareness/insight to disability: intact     Physical Exam:     Balance:  Static Sitting   supervision   Dynamic Sitting   stand by assistance   Static Standing   contact guard assistance   Dynamic Standing   contact guard assistance     Upper Extremity Function:   Edema None noted   Sensation    -       Intact   Hand Dominance Right   LUE ROM  90* flexion all other planes WFL   RUE ROM WFL   LUE Strength  WFL except Sh flexion not assessed due to decreased range    RUE Strength  WFL   LUE  Strength WFL   RUE  Strength  WFL   Fine Motor Skills     -       Intact   Gross Motor skills    WFL         AMPAC 6 Click ADL:  AMPAC Total Score: 19    Treatment & Education:   Therapist provided facilitation and instruction of proper body mechanics and fall prevention strategies during tasks listed above.   Instructed patient to sit in bedside chair daily to increase OOB/activity tolerance.   Instructed patient to use call light to have nursing staff assist with needs/transfers.   Discussed OT POC and answered all questions within OT scope of practice.   Whiteboard updated     Education:    Patient left up in chair with all lines intact and call button in reach    GOALS:   Multidisciplinary Problems     Occupational Therapy Goals        Problem: Occupational Therapy    Goal Priority Disciplines Outcome Interventions   Occupational Therapy Goal     OT, PT/OT Ongoing, Progressing    Description: Goals to be met by: 4/12/22     Patient will increase functional independence with ADLs by performing:    LE Dressing with Minimal Assistance.  Grooming  while standing at sink with Stand-by Assistance.  Toileting from toilet with Minimal Assistance for hygiene and clothing management.   Toilet transfer to toilet with Stand-by Assistance.  Increased functional strength to WFL for increased participation in ADLs.                     History:     Past Medical History:   Diagnosis Date    Acute hypoxemic respiratory failure 11/7/2015    Acute idiopathic gout of right elbow 9/23/2021    AICD (automatic cardioverter/defibrillator) present 12/13/2015      Anticoagulant long-term use     CHF (congestive heart failure)     Chronic anticoagulation 5/5/2016    Chronic combined systolic and diastolic heart failure 11/26/2012    EF 10-20% on ECHO 2013    Chronic gout 12/2/2019    Clotting disorder     Coronary artery disease involving native coronary artery of native heart without angina pectoris 11/26/2012    Cath 10- Stents patent non-obstructive disease Cath 11-12015 non-obstructive disease     COVID-19 08/2021    Had antibody infusion    Diverticulosis of colon     DVT (deep venous thrombosis), unspecified laterality 11/12/2015    Dysphonia 1/28/2018    Essential hypertension 11/15/2015    Fine motor impairment 2/2/2021    Hyperlipidemia     Hypertensive heart disease with heart failure 5/5/2016    MI (myocardial infarction) 2009    x's 5    Nicotine abuse     Obesity 11/26/2012    Olecranon bursitis of right elbow 9/19/2021    Pulmonary embolism 2011    Renal disorder     LAVERNE    Right carpal tunnel syndrome 4/6/2018    Stented coronary artery 11/26/2012    LAD stent placed 10/17/2007     Trigger thumb of right hand 4/6/2018       Past Surgical History:   Procedure Laterality Date    APPENDECTOMY      BACK SURGERY      CARDIAC DEFIBRILLATOR PLACEMENT      CARDIAC SURGERY  2007    stent    CARPAL TUNNEL RELEASE Right 04/2018    INSERTION OF BIVENTRICULAR IMPLANTABLE CARDIOVERTER-DEFIBRILLATOR (ICD) N/A 5/3/2019    Procedure:  INSERTION, ICD, BIVENTRICULAR;  Surgeon: Teofilo Pal MD;  Location: [facility] EP LAB;  Service: Cardiology;  Laterality: N/A;  ICH CM,  ICD UPGD BI-V,  SJM, MAC, FAS, 3PREP (dual ICD INSITU)    r knee scope      REVISION OF SKIN POCKET FOR CARDIOVERTER-DEFIBRILLATOR Left 5/3/2019    Procedure: Revision, Skin Pocket, For Cardioverter-Defibrillator;  Surgeon: Teofilo Pal MD;  Location: [facility] EP LAB;  Service: Cardiology;  Laterality: Left;    RIGHT HEART CATHETERIZATION Right 6/14/2021    Procedure: INSERTION, CATHETER, RIGHT HEART;  Surgeon: Lizz Nieto MD;  Location: [facility] CATH LAB;  Service: Cardiology;  Laterality: Right;    SPINE SURGERY      TONSILLECTOMY      TRIGGER FINGER RELEASE Right 04/2018    thumb       Time Tracking:     OT Date of Treatment: 03/29/22  OT Start Time: 1022  OT Stop Time: 1044  OT Total Time (min): 22 min    Billable Minutes:Evaluation 10  Self Care/Home Management 12    3/29/2022

## 2022-03-29 NOTE — ASSESSMENT & PLAN NOTE
Pt with chronically elevated troponin   Troponin 0.082 on admission   EKG with no signs of acute ischemic change  No complaints of CP/discomfort     Plan  -STAT EKG, troponin if chest pain arises

## 2022-03-29 NOTE — PT/OT/SLP EVAL
"Physical Therapy Co-Evaluation and Treatment    Patient Name:  Audrey Oneil Jr.   MRN:  [id number]    Recommendations:     Discharge Recommendations:  nursing facility, skilled   Discharge Equipment Recommendations: none   Barriers to discharge: Inaccessible home and Decreased caregiver support    Assessment:       Audrey Oneil Jr. is a 71 y.o. male admitted with a medical diagnosis of Severe sepsis.  He presents with the following impairments/functional limitations:  weakness, impaired balance, impaired endurance, decreased safety awareness, gait instability, impaired cognition, impaired functional mobilty.  Pt participated in functional mobility training, able to perform bed mobility, transfers, and ambulate 25 ft with RW and CGA. Pt had recently been discharged from rehab/SNF and was home for <2 days, will  Pt continues to present below their independent prior functional baseline. Pt would benefit from continued, skilled PT while in house to address the above listed impairments, further progress mobility as able, return towards highest level of function.      Rehab Prognosis: Fair; patient would benefit from acute skilled PT services 3 x/week to address these deficits and reach maximum level of function.  Patient is most appropriate to go to nursing facility, skilled .  Recent Surgery: * No surgery found *      Plan:     During this hospitalization, patient to be seen 3 x/week to address the identified rehab impairments via gait training, therapeutic activities, therapeutic exercises, neuromuscular re-education and progress toward the following goals:    · Plan of Care Expires:  04/28/22    Subjective     Subjective:"I was at home for about two days"  Pt goal: none defined  Pain/Comfort:  · Pain Rating 1: 5/10 (pt c/o abd pain)  · Pain Addressed 1: Reposition, Distraction, Cessation of Activity    Patients cultural, spiritual, Advent conflicts given the current situation: no    Living Environment: Pt " recently discharged from rehab to his sisters house which is a [facility] with 4 SALE[place]. Pt states his sister, brother in law, and their children live there.  Prior level of function:  Pt reports independence with use of RW after recent d/c from rehab/SNF (pt unable to distinguish between the options), states his girlfriend was assisting with ADLs. States his sister's 'large children' were carrying him up the stairs.  Falls within the last 90 days: denies  Equipment owned: wheelchair, walker, rolling, bedside commode  Support available upon discharge: family    Objective:     Communicated with nursing prior to session.  Patient found supine with telemetry, pulse ox (continuous)  upon PT entry to room.    Co-treatment performed for this visit due to patient need for two skilled therapists to ensure patient and staff safety and to accommodate for patient activity tolerance/pain management     General Precautions: Standard, fall   Orthopedic Precautions:N/A   Braces: N/A     Exams:  · Cognition: appropriate and cooperative, distracted, tangential thinking  · RLE ROM: WFL  · RLE Strength: WFL  · LLE ROM: WFL  · LLE Strength: WFL  · Posture: grossly intact  · Integumentary: grossly intact  · Sensation: N/A    Functional Mobility:  · Bed Mobility: supervision for supine>sit, use of momentum to upright trunk with HOB elevated 30 degrees  · Transfers: CGA for sit>stand from EOB with use of RW. Min assist for stand>sit with impaired eccentric control noted  · Gait: Pt ambulated 25 ft within room with use of RW and CGA. Pt demo's reduced nidia, narrow RHONDA, reduced foot clearance, and decreased step length. Cueing for RW management for safety, slight impulsivity noted. Concern for ongoing tachycardia with HR >118 bpm during ambulation so further gait training deferred.  · Balance:   · Sitting: grossly intact  · Standing: impaired; CGA when standing with use of RW with impaired safety awareness, no overt LOB noted    Therapeutic  activities and education:  Education provided to pt re: d/c planning, PT POC, safety in mobility, benefits of mobility, RW management. Pt endorses understanding.     AM-PAC 6 CLICK MOBILITY  Total Score:19     Patient left up in chair with all lines intact and RN notified.    GOALS:   Multidisciplinary Problems     Physical Therapy Goals        Problem: Physical Therapy    Goal Priority Disciplines Outcome Goal Variances Interventions   Physical Therapy Goal     PT, PT/OT Ongoing, Progressing     Description: Goals to be met by: 22    Patient will increase functional independence with mobility by performin. Pt will perform supine<>sit with independence to improve independence with mobility  2. Pt will perform functional transfers with independence   3. Pt will ambulate >150 ft feet with LRAD and supervision to safely perform household distance ambulation   4. Pt will ascend/descend 4 stairs with supervision to safely manage within home.                    History:     Past Medical History:   Diagnosis Date    Acute hypoxemic respiratory failure 2015    Acute idiopathic gout of right elbow 2021    AICD (automatic cardioverter/defibrillator) present 2015     Anticoagulant long-term use     CHF (congestive heart failure)     Chronic anticoagulation 2016    Chronic combined systolic and diastolic heart failure 2012    EF 10-20% on ECHO     Chronic gout 2019    Clotting disorder     Coronary artery disease involving native coronary artery of native heart without angina pectoris 2012    Cath 10- Stents patent non-obstructive disease Cath  non-obstructive disease     COVID-19 2021    Had antibody infusion    Diverticulosis of colon     DVT (deep venous thrombosis), unspecified laterality 2015    Dysphonia 2018    Essential hypertension 11/15/2015    Fine motor impairment 2021    Hyperlipidemia     Hypertensive  heart disease with heart failure 5/5/2016    MI (myocardial infarction) 2009    x's 5    Nicotine abuse     Obesity 11/26/2012    Olecranon bursitis of right elbow 9/19/2021    Pulmonary embolism 2011    Renal disorder     LAVERNE    Right carpal tunnel syndrome 4/6/2018    Stented coronary artery 11/26/2012    LAD stent placed 10/17/2007     Trigger thumb of right hand 4/6/2018       Past Surgical History:   Procedure Laterality Date    APPENDECTOMY      BACK SURGERY      CARDIAC DEFIBRILLATOR PLACEMENT      CARDIAC SURGERY  2007    stent    CARPAL TUNNEL RELEASE Right 04/2018    INSERTION OF BIVENTRICULAR IMPLANTABLE CARDIOVERTER-DEFIBRILLATOR (ICD) N/A 5/3/2019    Procedure: INSERTION, ICD, BIVENTRICULAR;  Surgeon: Teofilo Pal MD;  Location: [facility] EP LAB;  Service: Cardiology;  Laterality: N/A;  ICH CM,  ICD UPGD BI-V,  SJM, MAC, FAS, 3PREP (dual ICD INSITU)    r knee scope      REVISION OF SKIN POCKET FOR CARDIOVERTER-DEFIBRILLATOR Left 5/3/2019    Procedure: Revision, Skin Pocket, For Cardioverter-Defibrillator;  Surgeon: Teofilo Pal MD;  Location: [facility] EP LAB;  Service: Cardiology;  Laterality: Left;    RIGHT HEART CATHETERIZATION Right 6/14/2021    Procedure: INSERTION, CATHETER, RIGHT HEART;  Surgeon: Lizz Nieto MD;  Location: [facility] CATH LAB;  Service: Cardiology;  Laterality: Right;    SPINE SURGERY      TONSILLECTOMY      TRIGGER FINGER RELEASE Right 04/2018    thumb       Time Tracking:     PT Received On: 03/29/22  PT Start Time: 1022     PT Stop Time: 1041  PT Total Time (min): 19 min     Billable Minutes: Evaluation 10 min and Gait Training 9 min      Trinity Noble, PT, DPT, GCS  03/29/2022

## 2022-03-29 NOTE — ASSESSMENT & PLAN NOTE
71 M admitted with urosepsis. Associated symptoms include generalized abdominal. HD stable. QSOFA: hypotension (sBP < 100). WBC 11. Lactate 1.8. UA consistent with UTI. CXR negative. CT A/P showed diverticulitis along with concern for ATN vs. Pyelonephritis.   Patient has a chronic PICC line placed for dobutamine infusion that was clean, dry, without signs of infection.     DDx: urosepsis, pyelonephritis, acute mesenteric ischemia, bacteremia, diverticulitis. Lower concern for pyelonephritis given no leukocytosis and constitutional symptoms prior to admission. Less concerned for acute mesenteric ischemia given no pain on PO intake along with decrease in generalized pain since presentation to ED.       Plan:  - Received levofloxacin 750 mg x 1 dose in ED. Will continue on admission with plan to adjust based on speciation and sensitivities from urine culture.   - Telemetry  - F/u BCx, UCx  - Strict I/Os  - Goal MAP >65

## 2022-03-29 NOTE — ASSESSMENT & PLAN NOTE
Pt with generalized abdominal pain on exam without TTP on exam.   Started on empiric treatment with metronidazole in ED    CT A/P showed Several diverticula in the descending and sigmoid colon with associated wall thickening, mild adjacent fat stranding, and prominence of the vasa recta.  Findings are slightly worse when compared with prior studies and may indicate early acute diverticulitis or colitis in the correct clinical setting.  Note that two separate areas involving the left colon, with a skip area of colon with no inflammatory changes between them, suggesting possible inflammatory colitis involving 2 separate areas in the left colon, more likely.    Plan:   -Metronidazole 500 q8hrs with serial abdominal exams.   -Low threshold to consult GI for further evaluation given CT A/P showing concern for developing inflammatory colitis

## 2022-03-29 NOTE — ASSESSMENT & PLAN NOTE
Echo 9/21 showed EF 10-20%  Home meds: K 20mEq daily, furosemide 40mg qd, met succ 50mg, entresto 49-51mg   CXR negative for cardiogenic pulmonary edema       Plan:  - Furosemide 40mg PO daily as patient is euvolemic   - Daily weights (standing if tolerated)  - Strict I/Os  - Fluid restriction at 1500mL  - Cardiac diet  - potassium 20mEq daily   - Daily CMP  - Verify with pharmacy correct dose of entresto. Patient unsure if increased to highest dose

## 2022-03-29 NOTE — PLAN OF CARE
Problem: Occupational Therapy  Goal: Occupational Therapy Goal  Description: Goals to be met by: 4/12/22     Patient will increase functional independence with ADLs by performing:    LE Dressing with Minimal Assistance.  Grooming while standing at sink with Stand-by Assistance.  Toileting from toilet with Minimal Assistance for hygiene and clothing management.   Toilet transfer to toilet with Stand-by Assistance.  Increased functional strength to WFL for increased participation in ADLs.    Outcome: Ongoing, Progressing

## 2022-03-29 NOTE — PLAN OF CARE
Problem: Physical Therapy  Goal: Physical Therapy Goal  Description: Goals to be met by: 22    Patient will increase functional independence with mobility by performin. Pt will perform supine<>sit with independence to improve independence with mobility  2. Pt will perform functional transfers with independence   3. Pt will ambulate >150 ft feet with LRAD and supervision to safely perform household distance ambulation   4. Pt will ascend/descend 4 stairs with supervision to safely manage within home.   Outcome: Ongoing, Progressing     Evaluation completed and goals currently appropriate at this time.    Trinity Noble, PT, DPT, GCS  3/29/2022

## 2022-03-29 NOTE — CARE UPDATE
RAPID RESPONSE NURSE PROACTIVE ROUNDING NOTE       Time of Visit: 1102    Admit Date: 3/28/2022  LOS: 0  Code Status: Full Code   Date of Visit: 2022  : 1950  Age: 71 y.o.  Sex: male  Race: White  Bed: 58086/23179 A:   MRN: [id number]  Was the patient discharged from an ICU this admission? No   Was the patient discharged from a PACU within last 24 hours? No   Did the patient receive conscious sedation/general anesthesia in last 24 hours? No  Was the patient in the ED within the past 24 hours? Yes  Was the patient on NIPPV within the past 24 hours? No   Attending Physician: Nicole Alonso MD  Primary Service: [facility] MED 1   Time spent at the bedside: < 15 min    SITUATION    Notified by charge RN during rounding.  Reason for alert: ectopy   Called to evaluate the patient for Dysrythmia    BACKGROUND     Why is the patient in the hospital?: Severe sepsis    Patient has a past medical history of Acute hypoxemic respiratory failure, Acute idiopathic gout of right elbow, AICD (automatic cardioverter/defibrillator) present, Anticoagulant long-term use, CHF (congestive heart failure), Chronic anticoagulation, Chronic combined systolic and diastolic heart failure, Chronic gout, Clotting disorder, Coronary artery disease involving native coronary artery of native heart without angina pectoris, COVID-19, Diverticulosis of colon, DVT (deep venous thrombosis), unspecified laterality, Dysphonia, Essential hypertension, Fine motor impairment, Hyperlipidemia, Hypertensive heart disease with heart failure, MI (myocardial infarction), Nicotine abuse, Obesity, Olecranon bursitis of right elbow, Pulmonary embolism, Renal disorder, Right carpal tunnel syndrome, Stented coronary artery, and Trigger thumb of right hand.    Last Vitals:  Temp: 98.3 °F (36.8 °C) ( 1153)  Pulse: 90 ( 1153)  Resp: 18 ( 1217)  BP: 102/57 ( 1153)  SpO2: 96 % ( 1254)    24 Hours Vitals Range:  Temp:  [97.3 °F (36.3  °C)-99.1 °F (37.3 °C)]   Pulse:  [82-96]   Resp:  [15-20]   BP: ()/(45-69)   SpO2:  [91 %-98 %]     Labs:  Recent Labs     03/28/22  1658 03/28/22  1700 03/29/22  0406   WBC  --  11.10 8.21   HGB  --  11.8* 10.9*   HCT 37 37.3* 34.7*   PLT  --  293 277       Recent Labs     03/28/22  1700 03/29/22  0406   * 135*   K 4.4 4.4   CL 99 102   CO2 22* 21*   CREATININE 1.6* 1.3    82   PHOS 4.0 4.0   MG 2.0 1.9        No results for input(s): PH, PCO2, PO2, HCO3, POCSATURATED, BE in the last 72 hours.     ASSESSMENT    Physical Exam  Vitals and nursing note reviewed.   Constitutional:       General: He is not in acute distress.  Cardiovascular:      Rate and Rhythm: Normal rate and regular rhythm. Occasional extrasystoles are present.  Pulmonary:      Effort: Pulmonary effort is normal. No respiratory distress.   Neurological:      Mental Status: He is alert.     Notified during rounding by CN for patient on home dobutamine with occasional ectopy. K 4.4, Mag 1.9.   Patient seen at bedside. He is sitting up in chair, no complaints.   Normal rhythm noted on telemetry. HR 80s.   Hemodynamically stable at this time.     INTERVENTIONS    The patient was seen for a Cardiac problem. Staff concerns included ectopy The following interventions were performed: continuous cardiac monitoring     RECOMMENDATIONS    Continue cardiac monitoring.   Monitor electrolytes, ideally K>4, Mag>2     PROVIDER ESCALATION    Yes/No  no    Orders received and case discussed with NA.    Disposition: Remain in room 38145.    FOLLOW-UP    Charge Dodie TALAMANTES updated on plan of care. Instructed to call the Rapid Response Nurse, Pamela Hammond RN at [phone] for additional questions or concerns.

## 2022-03-29 NOTE — SUBJECTIVE & OBJECTIVE
Past Medical History:   Diagnosis Date    Acute hypoxemic respiratory failure 11/7/2015    Acute idiopathic gout of right elbow 9/23/2021    AICD (automatic cardioverter/defibrillator) present 12/13/2015      Anticoagulant long-term use     CHF (congestive heart failure)     Chronic anticoagulation 5/5/2016    Chronic combined systolic and diastolic heart failure 11/26/2012    EF 10-20% on ECHO 2013    Chronic gout 12/2/2019    Clotting disorder     Coronary artery disease involving native coronary artery of native heart without angina pectoris 11/26/2012    Cath 10- Stents patent non-obstructive disease Cath 11-12015 non-obstructive disease     COVID-19 08/2021    Had antibody infusion    Diverticulosis of colon     DVT (deep venous thrombosis), unspecified laterality 11/12/2015    Dysphonia 1/28/2018    Essential hypertension 11/15/2015    Fine motor impairment 2/2/2021    Hyperlipidemia     Hypertensive heart disease with heart failure 5/5/2016    MI (myocardial infarction) 2009    x's 5    Nicotine abuse     Obesity 11/26/2012    Olecranon bursitis of right elbow 9/19/2021    Pulmonary embolism 2011    Renal disorder     LAVERNE    Right carpal tunnel syndrome 4/6/2018    Stented coronary artery 11/26/2012    LAD stent placed 10/17/2007     Trigger thumb of right hand 4/6/2018       Past Surgical History:   Procedure Laterality Date    APPENDECTOMY      BACK SURGERY      CARDIAC DEFIBRILLATOR PLACEMENT      CARDIAC SURGERY  2007    stent    CARPAL TUNNEL RELEASE Right 04/2018    INSERTION OF BIVENTRICULAR IMPLANTABLE CARDIOVERTER-DEFIBRILLATOR (ICD) N/A 5/3/2019    Procedure: INSERTION, ICD, BIVENTRICULAR;  Surgeon: Teofilo Pal MD;  Location: [facility];  Service: Cardiology;  Laterality: N/A;  ICH CM,  ICD UPGD BI-V,  SJM, MAC, FAS, 3PREP (dual ICD INSITU)    r knee scope      REVISION OF SKIN POCKET FOR CARDIOVERTER-DEFIBRILLATOR Left 5/3/2019    Procedure: Revision, Skin Pocket, For  Cardioverter-Defibrillator;  Surgeon: Teofilo Pal MD;  Location: [facility] EP LAB;  Service: Cardiology;  Laterality: Left;    RIGHT HEART CATHETERIZATION Right 6/14/2021    Procedure: INSERTION, CATHETER, RIGHT HEART;  Surgeon: Lizz Nieto MD;  Location: [facility] CATH LAB;  Service: Cardiology;  Laterality: Right;    SPINE SURGERY      TONSILLECTOMY      TRIGGER FINGER RELEASE Right 04/2018    thumb       Review of patient's allergies indicates:   Allergen Reactions    Iodine containing multivitamin Swelling     itching    Keflex [cephalexin] Swelling     Eyes.  Tolerated multiple doses of zosyn and 1 dose of augmentin in 2015 and 2016, respectively    Peaches [peach (prunus persica)] Swelling     eyes    Shellfish containing products Swelling    Fig tree Swelling     itching    Tuberculin spenser test ppd Rash       No current facility-administered medications on file prior to encounter.     Current Outpatient Medications on File Prior to Encounter   Medication Sig    allopurinoL (ZYLOPRIM) 100 MG tablet Take 2 tablets (200 mg total) by mouth once daily.    amiodarone (PACERONE) 200 MG Tab TAKE 1 AND 1/2 TABLETS(300 MG) BY MOUTH EVERY DAY (Patient not taking: Reported on 3/18/2022)    aspirin (ECOTRIN) 81 MG EC tablet Take 1 tablet (81 mg total) by mouth once daily.    atorvastatin (LIPITOR) 80 MG tablet Take 1 tablet (80 mg total) by mouth once daily. (Patient not taking: Reported on 3/18/2022)    clopidogreL (PLAVIX) 75 mg tablet Take 1 tablet (75 mg total) by mouth once daily. (Patient not taking: Reported on 3/18/2022)    colchicine (COLCRYS) 0.6 mg tablet Take 1 tablet (0.6 mg total) by mouth once daily. (Patient not taking: Reported on 3/18/2022)    DOBUTamine (DOBUTREX) 500 mg/250 mL (2,000 mcg/mL) Inject 188-940 mcg/min into the vein. 100mg    docusate sodium (COLACE) 100 MG capsule Take 1 capsule (100 mg total) by mouth 2 (two) times daily.    furosemide (LASIX) 40 MG tablet Take 1 tablet (40 mg total)  by mouth once daily.    ipratropium (ATROVENT) 0.02 % nebulizer solution Take 2.5 mLs (500 mcg total) by nebulization 4 (four) times daily as needed for Wheezing. Rescue    metoprolol succinate (TOPROL-XL) 50 MG 24 hr tablet Take 1 tablet (50 mg total) by mouth once daily.    midodrine (PROAMATINE) 2.5 MG Tab Take 1 tablet (2.5 mg total) by mouth 3 (three) times daily.    mineral oil-hydrophil petrolat (AQUAPHOR) Oint Apply topically once daily.    ondansetron (ZOFRAN-ODT) 4 MG TbDL Take 1 tablet (4 mg total) by mouth every 6 (six) hours as needed (nausea).    oxyCODONE-acetaminophen (PERCOCET) 5-325 mg per tablet Take 1 tablet by mouth every 6 (six) hours as needed for Pain.    pantoprazole (PROTONIX) 40 MG tablet Take 40 mg by mouth.    paricalcitoL (ZEMPLAR) 1 MCG capsule Take 1 capsule (1 mcg total) by mouth once daily.    polyethylene glycol (GLYCOLAX) 17 gram PwPk Take 17 g by mouth 2 (two) times daily.    potassium chloride SA (K-DUR,KLOR-CON) 20 MEQ tablet Take 1 tablet (20 mEq total) by mouth Daily. TAKE 1 TABLET(20 MEQ) BY MOUTH daily  with lasix    sacubitril/valsartan (ENTRESTO ORAL) Take 1 tablet by mouth 2 (two) times a day.    sennosides (SENNA) 8.6 mg Cap Take 1 capsule by mouth once daily.    simethicone (MYLICON) 80 MG chewable tablet Take 1 tablet (80 mg total) by mouth every 6 (six) hours as needed for Flatulence.    wound dressings Pste Apply 1 application topically.     Family History       Problem Relation (Age of Onset)    Cancer Mother, Paternal Grandmother    Colon polyps Father    Diabetes Mother    Heart disease Mother, Father    No Known Problems Sister, Daughter, Son, Sister, Son    Stroke Father          Tobacco Use    Smoking status: Former Smoker     Packs/day: 1.00     Years: 45.00     Pack years: 45.00     Types: Cigarettes     Quit date: 2005     Years since quittin.2    Smokeless tobacco: Never Used    Tobacco comment: 1-1.5 ppd every day.   Substance and Sexual  Activity    Alcohol use: No    Drug use: No    Sexual activity: Never     Review of Systems   Constitutional:  Negative for chills, diaphoresis and fever.   HENT:  Negative for postnasal drip, sinus pressure and trouble swallowing.    Eyes:  Negative for visual disturbance.   Respiratory:  Negative for shortness of breath.    Cardiovascular:  Negative for chest pain and palpitations.   Gastrointestinal:  Positive for abdominal pain, constipation, diarrhea and nausea. Negative for vomiting.   Genitourinary:  Negative for dysuria.   Musculoskeletal:  Negative for back pain.   Skin:  Negative for rash.   Allergic/Immunologic: Negative for immunocompromised state.   Neurological:  Negative for syncope and headaches.   Hematological:  Does not bruise/bleed easily.   Psychiatric/Behavioral:  Negative for confusion and decreased concentration.    Objective:     Vital Signs (Most Recent):  Temp: 98.8 °F (37.1 °C) (03/29/22 0330)  Pulse: 83 (03/29/22 0456)  Resp: 16 (03/29/22 0358)  BP: 125/62 (03/29/22 0330)  SpO2: (!) 91 % (03/29/22 0330)   Vital Signs (24h Range):  Temp:  [98.8 °F (37.1 °C)-99.1 °F (37.3 °C)] 98.8 °F (37.1 °C)  Pulse:  [83-96] 83  Resp:  [15-20] 16  SpO2:  [91 %-98 %] 91 %  BP: ()/(45-69) 125/62     Weight: 95.7 kg (211 lb)  Body mass index is 33.05 kg/m².    Physical Exam  Vitals and nursing note reviewed.   Constitutional:       Appearance: He is obese.   HENT:      Head: Normocephalic and atraumatic.      Nose: Nose normal.      Mouth/Throat:      Mouth: Mucous membranes are moist.      Pharynx: No oropharyngeal exudate or posterior oropharyngeal erythema.   Eyes:      General: No scleral icterus.     Extraocular Movements: Extraocular movements intact.      Conjunctiva/sclera: Conjunctivae normal.      Pupils: Pupils are equal, round, and reactive to light.   Cardiovascular:      Rate and Rhythm: Normal rate and regular rhythm.      Pulses: Normal pulses.      Heart sounds: Normal heart sounds.  No murmur heard.    No friction rub. No gallop.   Pulmonary:      Effort: Pulmonary effort is normal.      Breath sounds: Normal breath sounds. No wheezing or rhonchi.   Abdominal:      General: Bowel sounds are normal. There is no distension.      Palpations: Abdomen is soft.      Tenderness: There is no abdominal tenderness. There is no right CVA tenderness, left CVA tenderness or guarding.   Musculoskeletal:         General: Normal range of motion.      Cervical back: Normal range of motion.      Right lower leg: No edema.      Left lower leg: No edema.   Skin:     General: Skin is warm and dry.      Capillary Refill: Capillary refill takes less than 2 seconds.      Findings: No bruising.   Neurological:      General: No focal deficit present.      Mental Status: He is alert and oriented to person, place, and time.   Psychiatric:         Mood and Affect: Mood normal.         Behavior: Behavior normal.         Thought Content: Thought content normal.         Judgment: Judgment normal.         CRANIAL NERVES     CN III, IV, VI   Pupils are equal, round, and reactive to light.     Significant Labs: All pertinent labs within the past 24 hours have been reviewed.  Blood Culture:   Recent Labs   Lab 03/28/22 1659   LABBLOO No Growth to date  No Growth to date     CBC:   Recent Labs   Lab 03/28/22 1658 03/28/22  1700 03/29/22  0406   WBC  --  11.10 8.21   HGB  --  11.8* 10.9*   HCT 37 37.3* 34.7*   PLT  --  293 277     CMP:   Recent Labs   Lab 03/28/22  1700 03/29/22  0406   * 135*   K 4.4 4.4   CL 99 102   CO2 22* 21*    82   BUN 35* 29*   CREATININE 1.6* 1.3   CALCIUM 9.8 10.1   PROT 6.7 6.3   ALBUMIN 2.9* 2.6*   BILITOT 0.5 0.4   ALKPHOS 98 93   AST 16 13   ALT 10 7*   ANIONGAP 14 12   EGFRNONAA 43.3* 55.7*     Troponin:   Recent Labs   Lab 03/28/22  1700   TROPONINI 0.082*     Urine Culture: No results for input(s): LABURIN in the last 48 hours.  Urine Studies:   Recent Labs   Lab 03/28/22 1658    COLORU Yellow   APPEARANCEUA Hazy*   PHUR 5.0   SPECGRAV 1.015   PROTEINUA Negative   GLUCUA Negative   KETONESU Negative   BILIRUBINUA Negative   OCCULTUA Negative   NITRITE Positive*   LEUKOCYTESUR 3+*   RBCUA 6*   WBCUA >100*   BACTERIA Moderate*   SQUAMEPITHEL 0       Significant Imaging: I have reviewed all pertinent imaging results/findings within the past 24 hours.    Imaging Results               CTA Abdomen and Pelvis (Final result)  Result time 03/29/22 00:02:14      Final result by Denton Samayoa MD (03/29/22 00:02:14)                   Impression:      1. The celiac artery is patent without significant calcific atherosclerosis at its origin. The superior mesenteric artery is patent with mild calcific atherosclerosis at its origin. The inferior mesenteric artery is patent.  Small calcified right renal artery aneurysm, unchanged from 12/12/2021.  2. Left rectus sheath hematoma, does not cross midline or dissect fascial planes.  No contrast extravasation demonstrated to suggest active bleeding.  Recommend continued close follow-up to ensure resolution.  3. Kidneys demonstrate bilateral striated nephrograms and mild nonspecific perinephric stranding suggestive of acute tubular necrosis, hypotension, or pyelonephritis.  Suggest correlation with urinalysis and renal function.  4. Several diverticula in the descending and sigmoid colon with associated wall thickening, mild adjacent fat stranding, and prominence of the vasa recta.  Findings are slightly worse when compared with prior studies and may indicate early acute diverticulitis or colitis in the correct clinical setting.  Note that two separate areas involving the left colon, with a skip area of colon with no inflammatory changes between them, suggesting possible inflammatory colitis involving 2 separate areas in the left colon, more likely.  No perforation or abscess.  5. 2.9 cm multi cystic, lobulated lesion the pancreatic head/uncinate process,  not definitely visualized on prior imaging likely related to differences in technique and may represent a pancreatic serous cystadenoma or IPMN.  Additional smaller 1.1 cm cyst within the pancreatic tail, possibly side branch IPMN.  No associated pancreatic ductal dilatation.  Further evaluation may be obtained with non emergent contrast enhanced MRI as clinically indicated.  6. Mild prostatomegaly.  Suggest correlation with PSA.  7.  Additional findings as above.  This report was flagged in Epic as abnormal.    Electronically signed by resident: Reinaldo Gracia  Date:    03/28/2022  Time:    22:21    Electronically signed by: Denton Samayoa MD  Date:    03/29/2022  Time:    00:02               Narrative:    EXAMINATION:  CTA ABDOMEN AND PELVIS    CLINICAL HISTORY:  Mesenteric ischemia, acute;    TECHNIQUE:  Using 100 cc of  Omnipaque 350 IV contrast, and multi-detector helical CT technique, axial CT angiogram images of the abdomen were obtained from the lung bases through the pelvis.  Multiplanar reconstructions including MIP images were post processed for vessel analysis.  No oral contrast was given.    COMPARISON:  CT abdomen pelvis 09/23/2018. CTA chest 12/12/2021.    FINDINGS:  Heart: Mild cardiomegaly.  No pericardial effusion.  Partially visualized AICD leads.    Lung Bases: Symmetrically expanded and clear.  Small left-sided Bochdalek hernia noted, unchanged.    Liver: Normal in size and attenuation without focal hepatic abnormality.    Gallbladder: Normal appearance without evidence for cholecystitis.    Bile Ducts: No intra or extrahepatic biliary ductal dilation.    Pancreas: There is a 2.9 cm multi-cystic, lobulated lesion in the pancreatic head/uncinate process.  The individual cysts are small all measuring less than 1.5 cm (axial series 2, image 272).  Additional 1.1 cm cyst within the pancreatic tail (axial series 2, image 193).    Spleen: Normal in size.    Adrenals: Normal.    Genitourinary:  Kidneys are mildly atrophic and demonstrate bilateral striated nephrograms and mild nonspecific perinephric stranding bilaterally.  No hydronephrosis.  No focal renal abnormality or nephrolithiasis.  Bladder demonstrates smooth contours without focal bladder wall thickening.    Reproductive organs: Prostate is mildly enlarged.    GI tract/Mesentery: Stomach is normal appearance.  Visualized loops of small and large bowel are normal in caliber without evidence for inflammation or obstruction.  Several diverticula again descending and sigmoid colon with associated mild colonic wall thickening and adjacent fat stranding with prominence of the vasa recta.  No perforation or focal abscess formation.    Peritoneal Space: No abdominopelvic ascites or intraperitoneal free air.    Retroperitoneum: No significant adenopathy.    Abdominal wall: There is a 6 x 4 x 5 cm high attenuation collection within the left rectus abdominus muscle, likely hematoma.  The collection is confined to the rectus muscle and does not cross the midline dissect fascial planes.  No contrast extravasation demonstrated within the hematoma.  Suspected small fat containing left inguinal hernia.  Moderate size fat containing ventral hernia.    Vasculature: Abdominal aorta is normal in caliber with significant calcific atherosclerosis along its course.  The celiac artery is patent without significant calcific atherosclerosis at its origin.  The superior mesenteric artery is patent with mild calcific atherosclerosis at its origin.  The inferior mesenteric artery is patent.  The bilateral renal arteries are patent.  Small calcified right renal artery aneurysm, unchanged from 12/12/2021.  The common and external/internal iliac arteries are patent with calcific atherosclerosis along its course.    Bones: Degenerative changes without acute fracture or bony destructive process.                                       X-Ray Chest AP Portable (Final result)  Result  time 03/28/22 17:55:30      Final result by Devendra Clark MD (03/28/22 17:55:30)                   Impression:      Cardiac device and right-sided PICC line without detrimental change or radiographic acute intrathoracic process seen.  Specifically, no consolidation.      Electronically signed by: Devendra Clark MD  Date:    03/28/2022  Time:    17:55               Narrative:    EXAMINATION:  XR CHEST AP PORTABLE    CLINICAL HISTORY:  Sepsis;    TECHNIQUE:  AP portable view of the chest.    COMPARISON:  Chest radiograph 01/16/2022 and CT thorax 12/12/2021    FINDINGS:  Monitoring leads overlie the chest.  Patient is slightly rotated.  Resolution is somewhat limited by body habitus with underpenetration.    Interval placement of right-sided PICC line with tip overlying the SVC.  Left upper chest multi lead cardiac device appears stable.  Cardiomediastinal silhouette is midline and prominent with calcification and tortuosity of the aorta and enlarged cardiac silhouette grossly similar to prior.  Pulmonary vasculature and hilar contours are within normal limits.  Few scattered linear opacities throughout the lungs consistent with minimal platelike scarring versus atelectasis.  The lungs are otherwise well expanded without large consolidation, pleural effusion or pneumothorax definitively seen.  No acute osseous process seen.  PA and lateral views can be obtained.

## 2022-03-29 NOTE — PLAN OF CARE
[facility] ([place] [address])  Initial Discharge Assessment       Primary Care Provider: January Khan MD    Admission Diagnosis: Nausea [R11.0]  Pyelonephritis [N12]  Multiple complaints [R68.89]  Generalized abdominal pain [R10.84]  Chest pain [R07.9]  Diarrhea, unspecified type [R19.7]    Admission Date: 3/28/2022  Expected Discharge Date:     Discharge Barriers Identified: None    Payor: [employer] / Plan: [employer] / Product Type: Medicare Advantage /     Extended Emergency Contact Information  Primary Emergency Contact: Riana Yu  Address: [facility] [place] [address] [place]  Mobile Phone: [phone]  Relation: Sister  Preferred language: English  Secondary Emergency Contact: Pam Saucedo  Mobile Phone: [phone]  Relation: Other    Discharge Plan A: Home Health, Home with family  Discharge Plan B: Home with family      [id number] [place] [facility] [address]  AT [facility] [address]  [address] [place] [address]  Phone: [phone] Fax: [phone]      Initial Assessment (most recent)     Adult Discharge Assessment - 03/29/22 1007        Discharge Assessment    Assessment Type Discharge Planning Assessment     Confirmed/corrected address, phone number and insurance Yes     Confirmed Demographics Correct on Facesheet     Source of Information patient     Does patient/caregiver understand observation status Yes     Communicated MADHAVI with patient/caregiver Date not available/Unable to determine     Reason For Admission Severe Sepsis     Lives With sibling(s)     Facility Arrived From: Home     Do you expect to return to your current living situation? Yes     Do you have help at home or someone to help you manage your care at home? Yes     Who are your caregiver(s) and their phone number(s)? Pam Saucedo (S/O) [phone] and Riana Yu (sister) [phone]      Prior to hospitilization cognitive status: Unable to Assess     Current cognitive status: Alert/Oriented     Walking or Climbing Stairs Difficulty ambulation difficulty, requires equipment;stair climbing difficulty, requires equipment     Mobility Management Rolling walker and wheelchair     Dressing/Bathing Difficulty none     Equipment Currently Used at Home oxygen;walker, rolling;wheelchair   Oxygen 2-3 L    Readmission within 30 days? No     Patient currently being followed by outpatient case management? No     Do you currently have service(s) that help you manage your care at home? Yes     Name and Contact number of agency [employer], patient unsure of HH agency.     Is the pt/caregiver preference to resume services with current agency Yes     Do you take prescription medications? Yes     Do you have prescription coverage? Yes     Coverage PHN     Do you have any problems affording any of your prescribed medications? No     Is the patient taking medications as prescribed? yes     Who is going to help you get home at discharge? Pam Saucedo (S/O) [phone] and Riana Yu (sister) [phone]     How do you get to doctors appointments? family or friend will provide     Are you on dialysis? No   Patient was previously on dialysis, Discontinued    Do you take coumadin? No     Discharge Plan A Home Health;Home with family     Discharge Plan B Home with family     DME Needed Upon Discharge  bedside commode   Patient requesting B/S commode    Discharge Plan discussed with: Spouse/sig other;Patient     Name(s) and Number(s) Pam Saucedo (S/O) [phone]     Discharge Barriers Identified None        Relationship/Environment    Name(s) of Who Lives With Patient Riana Yu (sister) [phone]                  SW met with patient and Pam Saucedo (S/O) [phone] room for Discharge Planning Assessment.  Patient was able to answer questions.  Per patient, he lives with his sister, Riana  Aimee in a single story residence with 4-5 step(s) to enter.   Per patient, he was moderately independent with ADLS and used wheelchair, rolling walker for ambulation.  Patient will have assistance from Pam Saucedo (S/O) [phone] and Riana Yu (sister) [phone] upon discharge.  All questions addressed.  Assigned SW/CM will follow for needs.    Adwoa Wells, [facility] [place]  [phone]

## 2022-03-29 NOTE — PLAN OF CARE
Alert and oriented x 4. Speech is clear. Moves independently in bed. Sister at bedside. Dobutamine infusing as ordered to right pic line. Voiding into urinal. VS stable. No c/o pain or sign of distress. Safety measures in place.

## 2022-03-29 NOTE — PHARMACY MED REC
"Admission Medication History     The home medication history was taken by Yuriy Santo.    You may go to "Admission" then "Reconcile Home Medications" tabs to review and/or act upon these items.      The home medication list has been updated by the Pharmacy department.    Please read ALL comments highlighted in yellow.    Please address this information as you see fit.     Feel free to contact us if you have any questions or require assistance.    Medications listed below were obtained from: SNF/Rehab/LTAC      PTA Medications   Medication Sig    aspirin (ECOTRIN) 81 MG EC tablet   Take 1 tablet (81 mg total) by mouth once daily.    diphenhydrAMINE (BENADRYL) 25 mg capsule   Take 25 mg by mouth as needed for Allergies.    DOBUTamine (DOBUTREX) 500 mg/250 mL (2,000 mcg/mL)   Inject 188-940 mcg/min into the vein. 100mg    docusate sodium (COLACE) 100 MG capsule   Take 1 capsule (100 mg total) by mouth 2 (two) times daily.    furosemide (LASIX) 40 MG tablet   Take 1 tablet (40 mg total) by mouth once daily.    metoprolol succinate (TOPROL-XL) 50 MG 24 hr tablet   Take 1 tablet (50 mg total) by mouth once daily.    midodrine (PROAMATINE) 2.5 MG Tab   Take 1 tablet (2.5 mg total) by mouth 3 (three) times daily.    mineral oil-hydrophil petrolat (AQUAPHOR) Oint   Apply topically once daily.    ondansetron (ZOFRAN-ODT) 4 MG TbDL   Take 1 tablet (4 mg total) by mouth every 6 (six) hours as needed (nausea).    oxyCODONE-acetaminophen (PERCOCET) 5-325 mg per tablet   Take 1 tablet by mouth every 6 (six) hours as needed for Pain.    polyethylene glycol (GLYCOLAX) 17 gram PwPk   Take 17 g by mouth 2 (two) times daily.    sennosides (SENNA) 8.6 mg Cap   Take 1 capsule by mouth once daily.    simethicone (MYLICON) 80 MG chewable tablet   Take 1 tablet (80 mg total) by mouth every 6 (six) hours as needed for Flatulence.    allopurinoL (ZYLOPRIM) 100 MG tablet   Take 2 tablets (200 mg total) by mouth once daily.    " wound dressings Pste Apply 1 application topically.       Potential issues to be addressed PRIOR TO DISCHARGE  The listed medications were obtained from another facility ([facility]). The patient may not have been able to fill these prescriptions prior to this admission and may require new scripts upon discharge.     Yuriy Santo CPhT  EXT [phone]                        .

## 2022-03-29 NOTE — ED PROVIDER NOTES
ED Resident HAND-OFF NOTE:  10:16 PM 3/29/2022  Audrey Oneil Jr. is a 71 y.o. male who presented to the ED on 3/28/2022 and has been managed by Dr. Burciaga, and Dr. Olivarez  who reports patient C/O of abdominal pain, that started earlier I assumed care of patient from off-going ED physician team at 10:16 PM pending CTA abdomen Pelvis CT abdomen pelvis notable for concern of diverticulitis.  Flagyl was added to antibiotics to cover intra-abdominal infections.  Discussed case with Hospital Medicine who will admit patient for continued antibiotics and observation      On my evaluation, Audrey Oneil Jr. appears well, hemodynamically stable and in NAD. Thus far, Audrey Oneil Jr. has received:  Medications   levoFLOXacin 750 mg/150 mL IVPB 750 mg (0 mg Intravenous Stopped 3/28/22 2248)   metroNIDAZOLE tablet 500 mg (has no administration in time range)   morphine injection 4 mg (4 mg Intravenous Given 3/28/22 2018)   iohexoL (OMNIPAQUE 350) injection 100 mL (100 mLs Intravenous Given 3/28/22 2154)           Disposition: Patient admitted to inpatient  hospital medicine   I have discussed and counseled Audrey Oneil Jr. regarding exam, results, diagnosis, treatment, and plan.  ______________________  Trish Ureña MD  Emergency Medicine Resident  10:16 PM 3/28/2022           Trish Ureña MD  Resident  03/29/22 0036       Lupe Infante MD  03/29/22 0039

## 2022-03-30 PROBLEM — A49.8 CLOSTRIDIUM DIFFICILE INFECTION: Status: ACTIVE | Noted: 2022-01-01

## 2022-03-30 NOTE — ASSESSMENT & PLAN NOTE
Pt with generalized abdominal pain on exam without TTP on exam.   Started on empiric treatment with metronidazole in ED    CT A/P showed Several diverticula in the descending and sigmoid colon with associated wall thickening, mild adjacent fat stranding, and prominence of the vasa recta.  Findings are slightly worse when compared with prior studies and may indicate early acute diverticulitis or colitis in the correct clinical setting.  Note that two separate areas involving the left colon, with a skip area of colon with no inflammatory changes between them, suggesting possible inflammatory colitis involving 2 separate areas in the left colon, more likely.    Plan:   -Metronidazole 500 q8hrs initially  -Changed to oral vanc once C. Dif resulted  -Low threshold to consult GI for further evaluation given CT A/P showing concern for developing inflammatory colitis

## 2022-03-30 NOTE — MEDICAL/APP STUDENT
Progress Note  Hospital Medicine                                                              Team: [facility] HOSP MED 1    Patient Name: Audrey Oneil Jr.  YOB: 1950    Admit Date: 3/28/2022                     LOS: 1    SUBJECTIVE:     Reason for Admission:  Severe sepsis    HPI: Mr. Oneil is a 71 M with PMHx of CAD, HFrEF (10-20%) on chronic dobutamine gtt, s/p AICD, h/o PE, HTN, HLD who presents to [facility] ED with acute generalized abdominal pain that started this afternoon after leaving therapy. Describes pain as dull, non radiating, generalized, 9/10 in severity. Complains of associated nausea but no episodes of emesis. Reports not having a bowel movement since Saturday, but previously having diarrhea with intermittent occurences of benoit sized stool beforehand. Denies prior episode of similar abdominal pain. No history of abdominal surgeries. Of note, Patient had a cardiac arrest in January, 2022.     ED Course: Hypotensive ( 79/45 mmHg) on arrival, otherwise vitals stable. UA consistent with UTI. No leukocytosis on labs. Cr 1.6. Trop 0.082. . Hgb 11.8. CXR negative for acute cardiopulmonary process. CT A/P concerning for diverticulitis and Pyelo vs. ATN. Started on levofloxacin for UTI coverage given allergy to keflex. Started on metro for empiric treatment of diverticulitis     Interval history: He is in stable condition with support from continuous dobutamine infusion. He experienced hypotensive episode last evening (89/53 mmHg) leading to midodrine administration to reach goal MAP. Patient also increased oxygen requirements due to episode of SOB (O2 sat 87-89%). 2L NC applied during this episode. This morning he was no longer on oxygen and O2 saturation was normal. He complains of generalized tenderness across the abdomen with the worst pain being in the suprapubic/infraumbilical region. He also complained of lower to mid back pain radiating from the suprapubic area at night time that has  now resolved when I saw him. This sounds  tract/kidney related and/or complications of the diverticulitis. He rates his pain as 7/10 despite pain management. He had a loose bowel movement during the early morning. Denies n/v. He has not been eating well attributing to lack of appetite at this time. He continues to mention some minor wheezing, but has not worsened since yesterday.     Urine culture shows growth of gram negative rods and >081697 cfu. Identification and susceptibility still pending on UCx. On ceftriaxone for coverage, levofloxacin started in ER was discontinued. Metronidazole continued due to CT/AP concerning for diverticulitis.     OBJECTIVE:     Vital Signs Range (Last 24H):  Temp:  [97.7 °F (36.5 °C)-99.5 °F (37.5 °C)]   Pulse:  []   Resp:  [16-20]   BP: ()/(46-58)   SpO2:  [87 %-100 %] Body mass index is 33.05 kg/m².  Wt Readings from Last 3 Encounters:   03/28/22 1631 95.7 kg (211 lb)   12/12/21 1959 109.8 kg (242 lb)   12/12/21 0021 109.8 kg (242 lb)   10/27/21 1503 108 kg (238 lb)       I & O (Last 24H):    Intake/Output Summary (Last 24 hours) at 3/30/2022 1242  Last data filed at 3/29/2022 2045  Gross per 24 hour   Intake --   Output 150 ml   Net -150 ml       Physical Exam:  General: appears stated age, fatigued, moderately obese  HENT: Head:normocephalic, atraumatic. Ears:hearing grossly normal bilaterally. Nose: Nares normal. Septum midline. Mucosa normal. No drainage or sinus tenderness.. Throat: no throat erythema, normal findings: lips normal without lesions, gums healthy, tongue midline and normal, soft palate, uvula, and tonsils normal and oropharynx pink & moist without lesions or evidence of thrush and abnormal findings: dentition: poor.  Eyes: conjunctivae/corneas clear. PERRL.   Neck: no JVD and supple, symmetrical, trachea midline, no JVD  Lungs:  clear to auscultation bilaterally and normal respiratory effort  Cardiovascular: Heart: regular rate and rhythm, S1, S2  normal and systolic murmur: early systolic 1/6, blowing at 2nd left intercostal space. Chest Wall: no tenderness. Extremities: no edema, redness or tenderness in the calves or thighs. Pulses: 2+ and symmetric.  Abdomen/Rectal: Abdomen: normal findings: no bruits heard, no masses palpable, no organomegaly, no renal abnormalities palpable and spleen non-palpable and abnormal findings:  distended, obese and moderate tenderness hypogastric and LUQ region worst, generalized across abdomen. Rectal: not examined  Skin: nails clear without discoloration or sign of infection, nails normal without clubbing, no edema, no periungual infections, temperature normal and texture normal or ecchymoses - arm(s) bilateral, abdomen and erythema - lower leg(s) bilateral  Musculoskeletal:gait abnormal due to right sided weakness from previous admission  Lymph Nodes: No cervical or supraclavicular adenopathy  Neurologic: Mental status: Alert, oriented, thought content appropriate  Cranial nerves: II: pupils equal, round, reactive to light and accommodation, III,IV,VI: extraocular muscles extra-ocular motions intact, IX: soft palate elevation normal bilaterally  Sensory: grossly normal  Motor:grossly normal  slight  weakness with R hand compared to L and weak plantarflexion/dorsiflexion to resistance on R foot compared to L. Although patient states weakness is on L side.   Psych/Behavioral:  Alert and oriented, appropriate affect.    Diagnostic Results:  Lab Results   Component Value Date    WBC 10.37 03/30/2022    HGB 12.6 (L) 03/30/2022    HCT 39.6 (L) 03/30/2022    MCV 92 03/30/2022     03/30/2022     Recent Labs   Lab 03/30/22  0318   GLU 90      K 4.5      CO2 21*   BUN 33*   CREATININE 2.5*   CALCIUM 9.4   MG 1.9     Lab Results   Component Value Date    INR 1.0 08/17/2021    INR 0.9 06/04/2021    INR 1.0 02/22/2021     Lab Results   Component Value Date    HGBA1C 5.7 (H) 09/20/2021     No results for input(s):  POCTGLUCOSE in the last 72 hours.           ASSESSMENT/PLAN:     Current Problems List:  Active Hospital Problems    Diagnosis  POA    *Severe sepsis [A41.9, R65.20]  Unknown    Diverticulitis [K57.92]  Unknown    Essential hypertension [I10]  Yes     Chronic    Chronic gout [M1A.9XX0]  Yes     Chronic    Elevated troponin I level [R77.8]  Unknown    Diverticula of colon [K57.30]  Unknown    Chronic combined systolic and diastolic congestive heart failure [I50.42]  Yes     11/30/15 Echo:  1 - Eccentric LVH with severely depressed left ventricular systolic function (EF 15-20%).   2 - Mild left atrial enlargement.   3 - Left ventricular diastolic dysfunction.   4 - Normal right ventricular systolic function .       Coronary artery disease involving native coronary artery of native heart without angina pectoris [I25.10]  Yes     Chronic     Cath 10- Stents patent non-obstructive disease  Cath 11-12015 non-obstructive disease        Resolved Hospital Problems   No resolved problems to display.       Active Problems, Status & Treatment Plan Addressed Today:    1. Sepsis    This patient does have evidence of infective focus  My overall impression is sepsis. Vital signs were reviewed and noted in progress note.  Antibiotics given- ceftriaxone for  tract infection coverage (Urine cx has grown gram negative rods so far, awaiting identification and susceptibility) and metronidazole for diverticulitis coverage.     Antibiotics (From admission, onward)            Start     Stop Route Frequency Ordered    03/29/22 1400  metroNIDAZOLE tablet 500 mg         -- Oral Every 8 hours 03/29/22 0915    03/29/22 1315  cefTRIAXone (ROCEPHIN) 2 g/50 mL D5W IVPB         -- IV Every 24 hours (non-standard times) 03/29/22 1314    03/29/22 0900  mupirocin 2 % ointment         04/03 0859 Nasl 2 times daily 03/29/22 0139        Cultures were taken-   Microbiology Results (last 7 days)     Procedure Component Value Units Date/Time     Clostridium difficile EIA [[id number]] Collected: 03/29/22 2259    Order Status: Sent Specimen: Stool Updated: 03/30/22 0533    Urine culture [[id number]]  (Abnormal) Collected: 03/28/22 1658    Order Status: Completed Specimen: Urine Updated: 03/29/22 2344     Urine Culture, Routine GRAM NEGATIVE SHONNA  >100,000 cfu/ml  Identification and susceptibility pending      Narrative:      Specimen Source->Urine    Blood culture x two cultures. Draw prior to antibiotics. [[id number]] Collected: 03/28/22 1659    Order Status: Completed Specimen: Blood from Peripheral, Hand, Left Updated: 03/29/22 2012     Blood Culture, Routine No Growth to date      No Growth to date    Narrative:      Aerobic and anaerobic    Blood culture x two cultures. Draw prior to antibiotics. [[id number]] Collected: 03/28/22 1659    Order Status: Completed Specimen: Blood from Peripheral, Hand, Left Updated: 03/29/22 2012     Blood Culture, Routine No Growth to date      No Growth to date    Narrative:      Aerobic and anaerobic    Clostridium difficile EIA [[id number]]     Order Status: Canceled Specimen: Stool         Latest lactate reviewed, they are-  Recent Labs   Lab 03/28/22  1700 03/28/22  2115   LACTATE 1.8 0.8       Organ dysfunction indicated by Acute kidney injury and this seems more likely due to hypoperfusion exacerbated by the HFrEF  Source- diverticulitis v  tract infection    Plan:    -continue abx ceftriaxone and metronidazole  -f/u BCx for any growth and UCx for identification and susceptibility  -strict I/O  -monitoring with telemetry  -Goal MAP is >65   -source still not thoroughly identified, diverticulitis v pyelonephritis v  tract infection ; serial abdominal exams, possibly order CT renal scan if BCx comes back negative.       2. Diverticulitis    CT A/P showed diverticula in descending and sigmoid colon with wall thickening. Possibly colitis v diverticulitis.     Plan:    -metronidazole  -serial abdominal examination  -GI  consult if abdominal pain not resolving after  tract infection resolved.    3.Essential Hypertension    Plan:     -Continue home medications  -repeat med reconciliation to adjust at home hypertension meds (recent critical care admission in January and SNF admission)    4. Gout    Plan: Continue home allopurinol dose.     5. Chronic combined systolic and diastolic congestive heart failure    Recent echo 9/21 showed EF 10-20%  Continue home meds: K 20mEq, furosemide 40mg qd, met succ 50 mg  Hold home med: entresto (hypotensive overnight)    Plan:     -furosemide PO 40mg daily  -daily weights  -strict I/Os   -fluid restriction 1500mL   -cardiac diet  -K 20mEq daily  -daily CMP  -patient still of entresto dose, held for now  -consider midodrine versus cautious fluid resuscitation with diuretic admin if BP drops again, LAVERNE is present and worsened on labs this morning; chest was clear on initial CXR and no edema or congestion  -CVP monitoring?    6. Coronary artery disease involving native coronary artery of native heart without angina pectoris    Continue home ASA and statin    VTE Risk Mitigation (From admission, onward)         Ordered     heparin (porcine) injection 5,000 Units  Every 8 hours         03/29/22 0136     IP VTE HIGH RISK PATIENT  Once         03/29/22 0136     Place sequential compression device  Until discontinued         03/29/22 0136                  Signing:  Tre Lynn

## 2022-03-30 NOTE — PROGRESS NOTES
[facility] - [facility] ([place] [id number])  Wound Care    Patient Name:  Audrey Oneil Jr.   MRN:  [id number]  Date: 3/30/2022  Diagnosis: Severe sepsis    History:     Past Medical History:   Diagnosis Date    Acute hypoxemic respiratory failure 2015    Acute idiopathic gout of right elbow 2021    AICD (automatic cardioverter/defibrillator) present 2015     Anticoagulant long-term use     CHF (congestive heart failure)     Chronic anticoagulation 2016    Chronic combined systolic and diastolic heart failure 2012    EF 10-20% on ECHO     Chronic gout 2019    Clotting disorder     Coronary artery disease involving native coronary artery of native heart without angina pectoris 2012    Cath 10- Stents patent non-obstructive disease Cath  non-obstructive disease     COVID-19 2021    Had antibody infusion    Diverticulosis of colon     DVT (deep venous thrombosis), unspecified laterality 2015    Dysphonia 2018    Essential hypertension 11/15/2015    Fine motor impairment 2021    Hyperlipidemia     Hypertensive heart disease with heart failure 2016    MI (myocardial infarction) 2009    x's 5    Nicotine abuse     Obesity 2012    Olecranon bursitis of right elbow 2021    Pulmonary embolism     Renal disorder     LAVERNE    Right carpal tunnel syndrome 2018    Stented coronary artery 2012    LAD stent placed 10/17/2007     Trigger thumb of right hand 2018       Social History     Socioeconomic History    Marital status:     Number of children: 2   Occupational History    Occupation: [employer]     Comment: unemployed   Tobacco Use    Smoking status: Former Smoker     Packs/day: 1.00     Years: 45.00     Pack years: 45.00     Types: Cigarettes     Quit date: 2005     Years since quittin.3    Smokeless tobacco: Never Used    Tobacco comment: 1-1.5 ppd every day.    Substance and Sexual Activity    Alcohol use: No    Drug use: No    Sexual activity: Never     Social Determinants of Health     Financial Resource Strain: Low Risk     Difficulty of Paying Living Expenses: Not hard at all   Food Insecurity: No Food Insecurity    Worried About Running Out of Food in the Last Year: Never true    Ran Out of Food in the Last Year: Never true   Transportation Needs: No Transportation Needs    Lack of Transportation (Medical): No    Lack of Transportation (Non-Medical): No   Physical Activity: Inactive    Days of Exercise per Week: 0 days    Minutes of Exercise per Session: 0 min   Stress: No Stress Concern Present    Feeling of Stress : Only a little   Social Connections: Unknown    Frequency of Communication with Friends and Family: More than three times a week    Frequency of Social Gatherings with Friends and Family: More than three times a week    Attends Restoration Services: Never    Active Member of Clubs or Organizations: No    Attends Club or Organization Meetings: Never   Housing Stability: Unknown    Unable to Pay for Housing in the Last Year: No    Unstable Housing in the Last Year: No       Precautions:     Allergies as of 03/28/2022 - Reviewed 03/28/2022   Allergen Reaction Noted    Iodine containing multivitamin Swelling 08/17/2014    Keflex [cephalexin] Swelling 11/24/2012    Peaches [peach (prunus persica)] Swelling 11/24/2012    Shellfish containing products Swelling 11/24/2012    Fig tree Swelling 08/13/2016    Tuberculin spenser test ppd Rash 11/24/2012       [facility] Assessment Details/Treatment   Wound care consulted for right posterior thigh, sacrum and groins, right heel  The buttocks/groin areas have redness, moisture to skin- POA- probable fungal skin infection.   The right posterior thigh has a partial thickness skin loss-POA- probable skin tear with open wound edges  The right heel has partial thickness skin loss with pink moist tissue with  open wound edges- POA.    Plan:  Buttocks/bilateral groin- nursing to clean/dry skin then apply Miconazole ointment BID/prn    right posterior thigh/and right heel-- nursing to cleanse with sterile normal saline, cover with a foam dressing every Tues/Friday until resolved.  Patient reviewed with Gillian skin integrity champion. She will follow up with patient.   Nursing to continue care, pressure prevention measures  Wound care will follow-up prn as needed    Recommendations made to primary team for above plan per secure chat . Orders placed.      03/30/22 1010        Wound 03/29/22 0344 Abrasion(s) Right Heel #1   Date First Assessed/Time First Assessed: 03/29/22 0344   Pre-existing: Yes  Primary Wound Type: Abrasion(s)  Side: Right  Location: Heel  Wound Number: #1   Wound Image    Wound WDL ex   Dressing Appearance Dry;Intact;Clean   Drainage Amount None   Appearance Pink;Moist   Tissue loss description Partial thickness   Periwound Area Intact;Dry;Pink   Wound Edges Open   Wound Length (cm) 1.5 cm   Wound Width (cm) 1.5 cm   Wound Depth (cm) 0.2 cm   Wound Volume (cm^3) 0.45 cm^3   Wound Surface Area (cm^2) 2.25 cm^2   Care Cleansed with:;Soap and water   Dressing Foam        Wound 03/29/22 0346 Abrasion(s) Right posterior Greater trochanter #2   Date First Assessed/Time First Assessed: 03/29/22 0346   Pre-existing: Yes  Primary Wound Type: Abrasion(s)  Side: Right  Orientation: posterior  Location: Greater trochanter  Wound Number: #2   Wound Image    Wound WDL ex   Dressing Appearance Dry;Intact   Drainage Amount Scant   Drainage Characteristics/Odor Serous   Appearance Pink;Moist   Tissue loss description Partial thickness   Periwound Area Intact;Dry;Redness   Wound Edges Open   Wound Length (cm) 1 cm   Wound Width (cm) 1 cm   Wound Depth (cm) 0.2 cm   Wound Volume (cm^3) 0.2 cm^3   Wound Surface Area (cm^2) 1 cm^2   Care Cleansed with:;Soap and water   Dressing Changed;Foam   Dressing Change Due 04/01/22      03/30/2022

## 2022-03-30 NOTE — ASSESSMENT & PLAN NOTE
Echo 9/21 showed EF 10-20%  Home meds: K 20mEq daily, furosemide 40mg qd, met succ 50mg, entresto 49-51mg   CXR negative for cardiogenic pulmonary edema       Plan:  - Furosemide 40mg PO daily as patient is euvolemic   - Daily weights (standing if tolerated)  - Strict I/Os  - Fluid restriction at 1500mL  - Cardiac diet  - Daily CMP  - Verify with pharmacy correct dose of entresto. Patient unsure if increased to highest dose

## 2022-03-30 NOTE — PLAN OF CARE
03/30/22 1022   Post-Acute Status   Post-Acute Authorization Placement   Post-Acute Placement Status Referrals Sent   Coverage PHN   Discharge Delays None known at this time   Discharge Plan   Discharge Plan A Skilled Nursing Facility   Discharge Plan B Home Health;Home with family     Forwarded SNF referrals to 9 local facilities. Also forwarded request to PHN. PASSR and 142 uploaded to [employer].    Adwoa Wells, [facility] [place]  [phone]

## 2022-03-30 NOTE — NURSING
"0300 Pt resting in bed, no distress noted.    2304 o2 87-89% 2L applied for SOB. Denies pain and nausea. Safety precautions maintained. Resting in bed.    2130 prochlorperazine given for nausea. Other scheduled meds given. BM noted. Pt cleaned Linens changed.Stool collected for cdiff.    2050 pt reports nausea. Called placed for PRN med. Will continue to monitor. Pt says he is "able to take if its a small pill".midodrine given.    2030 rapid response at bedside. New bp obtained 105/58 map 77. bm noted but no enough to send for stool sample.skin tear behind right thigh dressing clean and dry. Buttocks excoriated, redness to the bilateral groin. barrier apply. will continue to monitor.    2006 Pt aaox4. bp 89/53 map 68. Pt on continuous dobutamine drop  Infusing at 3.6ml/hr.  Attending team made aware, orders received for midodrine.   "

## 2022-03-30 NOTE — PROGRESS NOTES
[facility] - [facility] ([address] Medicine  Progress Note    Patient Name: Audrey Oneil Jr.  MRN: [id number]  Patient Class: IP- Inpatient   Admission Date: 3/28/2022  Length of Stay: 1 days  Attending Physician: Nicole Alonso MD  Primary Care Provider: January Khan MD        Subjective:     Principal Problem:Severe sepsis        HPI:  Mr. Oneil is a 71 M with PMHx of CAD, HFrEF (10-20%) on chronic dobutamine gtt, s/p AICD, h/o PE, HTN, HLD who presents to [facility] ED with acute generalized abdominal pain that started this afternoon after leaving therapy. Describes pain as dull, non radiating, generalized, 9/10 in severity. Complains of associated nausea but no episodes of emesis. Reports not having a bowel movement since Saturday, but previously having diarrhea with intermittent occurences of benoit sized stool beforehand. Denies prior episode of similar abdominal pain. No history of abdominal surgeries. Of note, Patient had a cardiac arrest in January, 2022.     ED Course: Hypotensive ( 79/45 mmHg) on arrival, otherwise vitals stable. UA consistent with UTI. No leukocytosis on labs. Cr 1.6. Trop 0.082. . Hgb 11.8. CXR negative for acute cardiopulmonary process. CT A/P concerning for diverticulitis and Pyelo vs. ATN. Started on levofloxacin for UTI coverage given allergy to keflex. Started on metro for empiric treatment of diverticulitis       Overview/Hospital Course:  71 M with PMHx of CAD, HFrEF (10-20%) on chronic dobutamine gtt, s/p AICD, h/o PE, HTN, HLD who is being treated for sepsis 2/2 to UTI and C. Diff diarrhea. Initially given levofloxacin. Then started on Ceftriaxone and flagyl. Oral vancomycin q6h x10 days started on 3/30. No hx of previous C. Diff infection per chart review. Cr increased to 2.5 (baseline 1-1.3), urine lytes ordered. Holding home entresto due to hypotension.       Interval History: Hypotensive to 84/46 overnight. Home midodrine started. Compazine for nausea.  C. Dif. Positive, started on oral vancomycin.     Review of Systems   Constitutional:  Negative for chills, diaphoresis and fever.   HENT:  Negative for postnasal drip, sinus pressure and trouble swallowing.    Eyes:  Negative for visual disturbance.   Respiratory:  Negative for shortness of breath.    Cardiovascular:  Negative for chest pain and palpitations.   Gastrointestinal:  Positive for abdominal pain, constipation, diarrhea and nausea. Negative for vomiting.   Genitourinary:  Positive for dysuria.   Musculoskeletal:  Negative for back pain.   Skin:  Negative for rash.   Allergic/Immunologic: Negative for immunocompromised state.   Neurological:  Negative for syncope and headaches.   Hematological:  Does not bruise/bleed easily.   Psychiatric/Behavioral:  Negative for confusion and decreased concentration.    Objective:     Vital Signs (Most Recent):  Temp: 97.7 °F (36.5 °C) (03/30/22 1202)  Pulse: 76 (03/30/22 1202)  Resp: 16 (03/30/22 1130)  BP: (!) 103/54 (03/30/22 1202)  SpO2: (!) 93 % (03/30/22 1202)   Vital Signs (24h Range):  Temp:  [97.7 °F (36.5 °C)-99.5 °F (37.5 °C)] 97.7 °F (36.5 °C)  Pulse:  [] 76  Resp:  [16-20] 16  SpO2:  [87 %-100 %] 93 %  BP: ()/(46-58) 103/54     Weight: 95.7 kg (211 lb)  Body mass index is 33.05 kg/m².    Intake/Output Summary (Last 24 hours) at 3/30/2022 1455  Last data filed at 3/29/2022 2045  Gross per 24 hour   Intake --   Output 150 ml   Net -150 ml      Physical Exam  Vitals and nursing note reviewed.   Constitutional:       Appearance: He is obese.   HENT:      Head: Normocephalic and atraumatic.      Nose: Nose normal.      Mouth/Throat:      Mouth: Mucous membranes are moist.      Pharynx: No oropharyngeal exudate or posterior oropharyngeal erythema.   Eyes:      General: No scleral icterus.  Cardiovascular:      Rate and Rhythm: Normal rate and regular rhythm.      Pulses: Normal pulses.      Heart sounds: Normal heart sounds. No murmur heard.    No  friction rub. No gallop.   Pulmonary:      Effort: Pulmonary effort is normal. No respiratory distress.      Breath sounds: Normal breath sounds. No wheezing, rhonchi or rales.   Abdominal:      General: Bowel sounds are normal. There is distension.      Palpations: Abdomen is soft.      Tenderness: There is no abdominal tenderness. There is no guarding.   Musculoskeletal:         General: Normal range of motion.      Cervical back: Normal range of motion.      Right lower leg: No edema.      Left lower leg: No edema.   Skin:     General: Skin is warm and dry.      Capillary Refill: Capillary refill takes less than 2 seconds.      Findings: No bruising.   Neurological:      General: No focal deficit present.      Mental Status: He is alert and oriented to person, place, and time.   Psychiatric:         Mood and Affect: Mood normal.         Behavior: Behavior normal.       Significant Labs: All pertinent labs within the past 24 hours have been reviewed.  CBC:   Recent Labs   Lab 03/28/22  1700 03/29/22  0406 03/30/22  0318   WBC 11.10 8.21 10.37   HGB 11.8* 10.9* 12.6*   HCT 37.3* 34.7* 39.6*    277 303     CMP:   Recent Labs   Lab 03/28/22  1700 03/29/22  0406 03/30/22  0318   * 135* 137   K 4.4 4.4 4.5   CL 99 102 101   CO2 22* 21* 21*    82 90   BUN 35* 29* 33*   CREATININE 1.6* 1.3 2.5*   CALCIUM 9.8 10.1 9.4   PROT 6.7 6.3 6.2   ALBUMIN 2.9* 2.6* 2.6*   BILITOT 0.5 0.4 0.3   ALKPHOS 98 93 101   AST 16 13 12   ALT 10 7* 7*   ANIONGAP 14 12 15   EGFRNONAA 43.3* 55.7* 25.3*         Significant Imaging: I have reviewed all pertinent imaging results/findings within the past 24 hours.      Assessment/Plan:      * Severe sepsis  71 M admitted with urosepsis. Associated symptoms include generalized abdominal. HD stable. QSOFA: hypotension (sBP < 100). WBC 11. Lactate 1.8. UA consistent with UTI. CXR negative. CT A/P showed diverticulitis along with concern for ATN vs. Pyelonephritis.   Patient has a  chronic PICC line placed for dobutamine infusion that was clean, dry, without signs of infection.     DDx: urosepsis, pyelonephritis, acute mesenteric ischemia, bacteremia, diverticulitis. Lower concern for pyelonephritis given no leukocytosis and constitutional symptoms prior to admission. Less concerned for acute mesenteric ischemia given no pain on PO intake along with decrease in generalized pain since presentation to ED.       Plan:  - Received levofloxacin 750 mg x 1 dose in ED.   - Ceftriaxone started on 3/29  - urine cx: GNR pending speciation and sensitivities  - Telemetry  - F/u BCx, UCx  - Strict I/Os  - Goal MAP >65       Clostridium difficile infection  Recent Abx use and diarrhea.     - PO vancomycin 125 mg q6h started on 3/30 for 10 days of coverage.     Diverticulitis  Pt with generalized abdominal pain on exam without TTP on exam.   Started on empiric treatment with metronidazole in ED    CT A/P showed Several diverticula in the descending and sigmoid colon with associated wall thickening, mild adjacent fat stranding, and prominence of the vasa recta.  Findings are slightly worse when compared with prior studies and may indicate early acute diverticulitis or colitis in the correct clinical setting.  Note that two separate areas involving the left colon, with a skip area of colon with no inflammatory changes between them, suggesting possible inflammatory colitis involving 2 separate areas in the left colon, more likely.    Plan:   -Metronidazole 500 q8hrs initially  -Changed to oral vanc once C. Dif resulted  -Low threshold to consult GI for further evaluation given CT A/P showing concern for developing inflammatory colitis         Essential hypertension  Hypotensive so far. Holding entresto.       Chronic gout  Continue home allopurinol 200mg      Elevated troponin I level  Pt with chronically elevated troponin   Troponin 0.082 on admission   EKG with no signs of acute ischemic change  No complaints  of CP/discomfort     Plan  -STAT EKG, troponin if chest pain arises      Diverticula of colon        Chronic combined systolic and diastolic congestive heart failure  Echo 9/21 showed EF 10-20%  Home meds: K 20mEq daily, furosemide 40mg qd, met succ 50mg, entresto 49-51mg   CXR negative for cardiogenic pulmonary edema       Plan:  - Furosemide 40mg PO daily as patient is euvolemic   - Daily weights (standing if tolerated)  - Strict I/Os  - Fluid restriction at 1500mL  - Cardiac diet  - Daily CMP  - Verify with pharmacy correct dose of entresto. Patient unsure if increased to highest dose       Coronary artery disease involving native coronary artery of native heart without angina pectoris  Continue home ASA and statin         VTE Risk Mitigation (From admission, onward)         Ordered     heparin (porcine) injection 5,000 Units  Every 8 hours         03/29/22 0136     IP VTE HIGH RISK PATIENT  Once         03/29/22 0136     Place sequential compression device  Until discontinued         03/29/22 0136                Discharge Planning   MADHAVI: 4/1/2022     Code Status: Full Code   Is the patient medically ready for discharge?: No    Reason for patient still in hospital (select all that apply): Patient trending condition, Laboratory test and Treatment  Discharge Plan A: Skilled Nursing Facility   Discharge Delays: None known at this time              Uma Dupree MD  Department of Hospital Medicine   [facility] - Intensive Care ([place]

## 2022-03-30 NOTE — PLAN OF CARE
SW completed LOCET interview with [employer] to obtain 142 for SNF placement. Waiting on 142.    Faxed PT/OT notes and H&P to [facility] via [facility] for SNF approval/placement.    Adwoa Wells [facility]  [phone]

## 2022-03-30 NOTE — SUBJECTIVE & OBJECTIVE
Interval History: Hypotensive to 84/46 overnight. Home midodrine started. Compazine for nausea. C. Dif. Positive, started on oral vancomycin.     Review of Systems   Constitutional:  Negative for chills, diaphoresis and fever.   HENT:  Negative for postnasal drip, sinus pressure and trouble swallowing.    Eyes:  Negative for visual disturbance.   Respiratory:  Negative for shortness of breath.    Cardiovascular:  Negative for chest pain and palpitations.   Gastrointestinal:  Positive for abdominal pain, constipation, diarrhea and nausea. Negative for vomiting.   Genitourinary:  Positive for dysuria.   Musculoskeletal:  Negative for back pain.   Skin:  Negative for rash.   Allergic/Immunologic: Negative for immunocompromised state.   Neurological:  Negative for syncope and headaches.   Hematological:  Does not bruise/bleed easily.   Psychiatric/Behavioral:  Negative for confusion and decreased concentration.    Objective:     Vital Signs (Most Recent):  Temp: 97.7 °F (36.5 °C) (03/30/22 1202)  Pulse: 76 (03/30/22 1202)  Resp: 16 (03/30/22 1130)  BP: (!) 103/54 (03/30/22 1202)  SpO2: (!) 93 % (03/30/22 1202)   Vital Signs (24h Range):  Temp:  [97.7 °F (36.5 °C)-99.5 °F (37.5 °C)] 97.7 °F (36.5 °C)  Pulse:  [] 76  Resp:  [16-20] 16  SpO2:  [87 %-100 %] 93 %  BP: ()/(46-58) 103/54     Weight: 95.7 kg (211 lb)  Body mass index is 33.05 kg/m².    Intake/Output Summary (Last 24 hours) at 3/30/2022 1455  Last data filed at 3/29/2022 2045  Gross per 24 hour   Intake --   Output 150 ml   Net -150 ml      Physical Exam  Vitals and nursing note reviewed.   Constitutional:       Appearance: He is obese.   HENT:      Head: Normocephalic and atraumatic.      Nose: Nose normal.      Mouth/Throat:      Mouth: Mucous membranes are moist.      Pharynx: No oropharyngeal exudate or posterior oropharyngeal erythema.   Eyes:      General: No scleral icterus.  Cardiovascular:      Rate and Rhythm: Normal rate and regular  rhythm.      Pulses: Normal pulses.      Heart sounds: Normal heart sounds. No murmur heard.    No friction rub. No gallop.   Pulmonary:      Effort: Pulmonary effort is normal. No respiratory distress.      Breath sounds: Normal breath sounds. No wheezing, rhonchi or rales.   Abdominal:      General: Bowel sounds are normal. There is distension.      Palpations: Abdomen is soft.      Tenderness: There is no abdominal tenderness. There is no guarding.   Musculoskeletal:         General: Normal range of motion.      Cervical back: Normal range of motion.      Right lower leg: No edema.      Left lower leg: No edema.   Skin:     General: Skin is warm and dry.      Capillary Refill: Capillary refill takes less than 2 seconds.      Findings: No bruising.   Neurological:      General: No focal deficit present.      Mental Status: He is alert and oriented to person, place, and time.   Psychiatric:         Mood and Affect: Mood normal.         Behavior: Behavior normal.       Significant Labs: All pertinent labs within the past 24 hours have been reviewed.  CBC:   Recent Labs   Lab 03/28/22  1700 03/29/22  0406 03/30/22  0318   WBC 11.10 8.21 10.37   HGB 11.8* 10.9* 12.6*   HCT 37.3* 34.7* 39.6*    277 303     CMP:   Recent Labs   Lab 03/28/22  1700 03/29/22  0406 03/30/22  0318   * 135* 137   K 4.4 4.4 4.5   CL 99 102 101   CO2 22* 21* 21*    82 90   BUN 35* 29* 33*   CREATININE 1.6* 1.3 2.5*   CALCIUM 9.8 10.1 9.4   PROT 6.7 6.3 6.2   ALBUMIN 2.9* 2.6* 2.6*   BILITOT 0.5 0.4 0.3   ALKPHOS 98 93 101   AST 16 13 12   ALT 10 7* 7*   ANIONGAP 14 12 15   EGFRNONAA 43.3* 55.7* 25.3*         Significant Imaging: I have reviewed all pertinent imaging results/findings within the past 24 hours.

## 2022-03-30 NOTE — ASSESSMENT & PLAN NOTE
71 M admitted with urosepsis. Associated symptoms include generalized abdominal. HD stable. QSOFA: hypotension (sBP < 100). WBC 11. Lactate 1.8. UA consistent with UTI. CXR negative. CT A/P showed diverticulitis along with concern for ATN vs. Pyelonephritis.   Patient has a chronic PICC line placed for dobutamine infusion that was clean, dry, without signs of infection.     DDx: urosepsis, pyelonephritis, acute mesenteric ischemia, bacteremia, diverticulitis. Lower concern for pyelonephritis given no leukocytosis and constitutional symptoms prior to admission. Less concerned for acute mesenteric ischemia given no pain on PO intake along with decrease in generalized pain since presentation to ED.       Plan:  - Received levofloxacin 750 mg x 1 dose in ED.   - Ceftriaxone started on 3/29  - urine cx: GNR pending speciation and sensitivities  - Telemetry  - F/u BCx, UCx  - Strict I/Os  - Goal MAP >65

## 2022-03-30 NOTE — PLAN OF CARE
CONRADO at [facility] [phone] or cell [phone], called to advise that the patient has been clinically accepted to their facility.     Lupe with [facility] ([phone]) left a VM advising that once the patient is accepted at a facility, they will forward the SNF request to their director for approval.    Adwoa Wells, [facility]  [phone]

## 2022-03-30 NOTE — HOSPITAL COURSE
71 M with PMHx of CAD, HFrEF (10-20%) on chronic dobutamine gtt, s/p AICD, h/o PE, HTN, HLD who is being treated for sepsis 2/2 to UTI and C. Diff diarrhea. Initially given levofloxacin. Then started on Ceftriaxone and flagyl. Oral vancomycin q6h x10 days started on 3/30. No hx of previous C. Diff infection per chart review. Cr increased to 2.5 (baseline 1-1.3), urine lytes ordered. Holding home entresto due to hypotension. Device check on 3/31, heart transplant consulted for management of dobutamine. Uric acid elevated, patient c/o right knee pain, concerned for gout flare. Uric acid elevated at 11.2, started on prednisone 40 daily. Stopped PO lasix due to worsening of LAVERNE. Gave 250 cc IVF with mild improvement in Cr 2.8>> 2.5. Diarrhea improved, now soft, still had 4 BM/day. Abdominal pain improved. DC to SNF once secured or HH with infusion for dobutamine. Entresto and lasix held at discharge. CMP for next week. Will need f/u with heart failure clinic for dobutamine and GDMT management.

## 2022-03-30 NOTE — ASSESSMENT & PLAN NOTE
Recent Abx use and diarrhea.     - PO vancomycin 125 mg q6h started on 3/30 for 10 days of coverage.

## 2022-03-30 NOTE — PLAN OF CARE
POC reviewed with patient and family. Continues on Dobutamine gtt. Stool specimen came back positive for Cdiff, patient to be started on oral vanc per MD's orders. Vital signs stable within shift. No acute events, will continue to monitor.

## 2022-03-31 PROBLEM — L30.8 DERMATITIS ASSOCIATED WITH MOISTURE: Status: ACTIVE | Noted: 2022-01-01

## 2022-03-31 PROBLEM — N18.31 STAGE 3A CHRONIC KIDNEY DISEASE: Status: ACTIVE | Noted: 2017-06-02

## 2022-03-31 PROBLEM — M10.062 ACUTE IDIOPATHIC GOUT OF LEFT KNEE: Status: ACTIVE | Noted: 2019-12-02

## 2022-03-31 PROBLEM — B96.20 E. COLI UTI: Status: ACTIVE | Noted: 2022-01-01

## 2022-03-31 PROBLEM — R23.8 SKIN BREAKDOWN: Status: ACTIVE | Noted: 2022-01-01

## 2022-03-31 PROBLEM — N39.0 E. COLI UTI: Status: ACTIVE | Noted: 2022-01-01

## 2022-03-31 PROBLEM — K57.92 DIVERTICULITIS: Status: RESOLVED | Noted: 2022-01-01 | Resolved: 2022-01-01

## 2022-03-31 PROBLEM — L89.602 PRESSURE INJURY OF HEEL, STAGE 2: Status: ACTIVE | Noted: 2022-01-01

## 2022-03-31 PROBLEM — J18.0 BRONCHOPNEUMONIA: Status: RESOLVED | Noted: 2021-01-01 | Resolved: 2022-01-01

## 2022-03-31 NOTE — PT/OT/SLP PROGRESS
Physical Therapy      Patient Name:  Audrey Oneil Jr.   MRN:  [id number]    Patient not seen today secondary to pt reported not feeling up to it today (c/o knee pain;gout), and for therapist to return on tomorrow. Pt educated to perform B LE home exercise program. Will follow-up scheduled.

## 2022-03-31 NOTE — PLAN OF CARE
03/31/22 0811   Discharge Reassessment   Assessment Type Discharge Planning Reassessment   Did the patient's condition or plan change since previous assessment? Yes  (From HH to SNF)   Discharge Plan discussed with: Patient   Communicated MADHAVI with patient/caregiver Date not available/Unable to determine   Discharge Plan A Skilled Nursing Facility   Discharge Plan B Home Health;Home with family   DME Needed Upon Discharge  other (see comments);bedside commode  (TBD)   Discharge Barriers Identified None   Why the patient remains in the hospital Requires continued medical care   Post-Acute Status   Post-Acute Authorization Placement   Post-Acute Placement Status Referrals Sent   Coverage PHN   Discharge Delays None known at this time     Patient not medically stable for discharge. [facility] has clinically accepted him as well as [facility] and [facility]. Will follow up with patient to obtain his choice.    Adwoa Wells, [facility] [place]  [phone]

## 2022-03-31 NOTE — PROGRESS NOTES
[facility] - [facility] ([address] Medicine  Progress Note    Patient Name: Audrey Oneil Jr.  MRN: [id number]  Patient Class: IP- Inpatient   Admission Date: 3/28/2022  Length of Stay: 2 days  Attending Physician: Jaxson Santo MD  Primary Care Provider: January Khan MD        Subjective:     Principal Problem:Severe sepsis        HPI:  Mr. Oneil is a 71 M with PMHx of CAD, HFrEF (10-20%) on chronic dobutamine gtt, s/p AICD, h/o PE, HTN, HLD who [place] to [facility] ED with acute generalized abdominal pain that started this afternoon after leaving therapy. Describes pain as dull, non radiating, generalized, 9/10 in severity. Complains of associated nausea but no episodes of emesis. Reports not having a bowel movement since Saturday, but previously having diarrhea with intermittent occurences of benoit sized stool beforehand. Denies prior episode of similar abdominal pain. No history of abdominal surgeries. Of note, Patient had a cardiac arrest in January, 2022.     ED Course: Hypotensive ( 79/45 mmHg) on arrival, otherwise vitals stable. UA consistent with UTI. No leukocytosis on labs. Cr 1.6. Trop 0.082. . Hgb 11.8. CXR negative for acute cardiopulmonary process. CT A/P concerning for diverticulitis and Pyelo vs. ATN. Started on levofloxacin for UTI coverage given allergy to keflex. Started on metro for empiric treatment of diverticulitis       Overview/Hospital Course:  71 M with PMHx of CAD, HFrEF (10-20%) on chronic dobutamine gtt, s/p AICD, h/o PE, HTN, HLD who is being treated for sepsis 2/2 to UTI and C. Diff diarrhea. Initially given levofloxacin. Then started on Ceftriaxone and flagyl. Oral vancomycin q6h x10 days started on 3/30. No hx of previous C. Diff infection per chart review. Cr increased to 2.5 (baseline 1-1.3), urine lytes ordered. Holding home entresto due to hypotension. Device check on 3/31, heart transplant consulted for management of dobutamine. Uric acid elevated,  patient c/o right knee pain, concerned for gout flare. Uric acid elevated at 11.2, started on prednisone 40 daily. Stopped PO lasix due to worsening of LAVERNE. Gave 250 cc IVF with mild improvement in Cr 2.8>> 2.5. Diarrhea improved, now soft, still had 4 BM/day.       Interval History: NAOE, hypotensive, gave 250 cc IVF, improved Cr. Started prednisone 40 mg daily for gout flare.     Review of Systems   Constitutional:  Negative for chills, diaphoresis and fever.   HENT:  Negative for postnasal drip, sinus pressure and trouble swallowing.    Eyes:  Negative for visual disturbance.   Respiratory:  Negative for shortness of breath.    Cardiovascular:  Negative for chest pain and palpitations.   Gastrointestinal:  Positive for abdominal pain, diarrhea and nausea. Negative for constipation and vomiting.   Genitourinary:  Positive for dysuria.   Musculoskeletal:  Negative for back pain.   Skin:  Negative for rash.   Allergic/Immunologic: Negative for immunocompromised state.   Neurological:  Negative for syncope and headaches.   Hematological:  Does not bruise/bleed easily.   Psychiatric/Behavioral:  Negative for confusion and decreased concentration.    Objective:     Vital Signs (Most Recent):  Temp: 97.8 °F (36.6 °C) (03/31/22 1330)  Pulse: 85 (03/31/22 1515)  Resp: 18 (03/31/22 1215)  BP: (!) 107/53 (03/31/22 1215)  SpO2: 98 % (03/31/22 1515)   Vital Signs (24h Range):  Temp:  [97.8 °F (36.6 °C)-98.5 °F (36.9 °C)] 97.8 °F (36.6 °C)  Pulse:  [83-93] 85  Resp:  [18] 18  SpO2:  [90 %-100 %] 98 %  BP: ()/(50-58) 107/53     Weight: 95.7 kg (211 lb)  Body mass index is 33.05 kg/m².    Intake/Output Summary (Last 24 hours) at 3/31/2022 1605  Last data filed at 3/31/2022 0622  Gross per 24 hour   Intake 40.48 ml   Output 325 ml   Net -284.52 ml      Physical Exam  Vitals and nursing note reviewed.   Constitutional:       Appearance: He is obese.   HENT:      Head: Normocephalic and atraumatic.      Nose: Nose normal.       Mouth/Throat:      Mouth: Mucous membranes are moist.      Pharynx: No oropharyngeal exudate or posterior oropharyngeal erythema.   Eyes:      General: No scleral icterus.  Cardiovascular:      Rate and Rhythm: Normal rate and regular rhythm.      Pulses: Normal pulses.      Heart sounds: Normal heart sounds. No murmur heard.    No friction rub. No gallop.   Pulmonary:      Effort: Pulmonary effort is normal. No respiratory distress.      Breath sounds: Normal breath sounds. No wheezing, rhonchi or rales.   Abdominal:      General: Bowel sounds are normal. There is distension.      Palpations: Abdomen is soft.      Tenderness: There is no abdominal tenderness. There is no guarding.   Musculoskeletal:         General: Normal range of motion.      Cervical back: Normal range of motion.      Right lower leg: No edema.      Left lower leg: No edema.      Comments: Medial right knee pain. No swelling, erythema, warmth, wound   Skin:     General: Skin is warm and dry.      Capillary Refill: Capillary refill takes less than 2 seconds.      Findings: No bruising.   Neurological:      General: No focal deficit present.      Mental Status: He is alert and oriented to person, place, and time.   Psychiatric:         Mood and Affect: Mood normal.         Behavior: Behavior normal.       Significant Labs: All pertinent labs within the past 24 hours have been reviewed.  CBC:   Recent Labs   Lab 03/30/22 0318 03/31/22 0317   WBC 10.37 9.07   HGB 12.6* 11.3*   HCT 39.6* 36.1*    293     CMP:   Recent Labs   Lab 03/30/22 0318 03/31/22 0317 03/31/22  1223    137 134*   K 4.5 4.3 4.6    102 100   CO2 21* 21* 25   GLU 90 80 95   BUN 33* 38* 35*   CREATININE 2.5* 2.8* 2.5*   CALCIUM 9.4 9.2 8.9   PROT 6.2 5.9*  --    ALBUMIN 2.6* 2.6*  --    BILITOT 0.3 0.2  --    ALKPHOS 101 86  --    AST 12 11  --    ALT 7* 5*  --    ANIONGAP 15 14 9   EGFRNONAA 25.3* 22.0* 25.3*       Significant Imaging: I have reviewed all  pertinent imaging results/findings within the past 24 hours.      Assessment/Plan:      * Severe sepsis  71 M admitted with urosepsis. Associated symptoms include generalized abdominal. HD stable. QSOFA: hypotension (sBP < 100). WBC 11. Lactate 1.8. UA consistent with UTI. CXR negative. CT A/P showed diverticulitis along with concern for ATN vs. Pyelonephritis.   Patient has a chronic PICC line placed for dobutamine infusion that was clean, dry, without signs of infection.     DDx: urosepsis, pyelonephritis, acute mesenteric ischemia, bacteremia, diverticulitis. Lower concern for pyelonephritis given no leukocytosis and constitutional symptoms prior to admission. Less concerned for acute mesenteric ischemia given no pain on PO intake along with decrease in generalized pain since presentation to ED.       Plan:  - Received levofloxacin 750 mg x 1 dose in ED.   - Ceftriaxone started on 3/29  - urine cx:E. Coli sensitive to ceftriaxone.   - Telemetry  - F/u BCx, UCx  - Strict I/Os  - Goal MAP >65       Skin breakdown  Recs per wound care      Dermatitis associated with moisture  Wound care      Pressure injury of heel, stage 2        E. coli UTI  Urine culture with E. Coli sensitive to erta, janel, ctx, cefazolin, bactrim.     - patient allergic to keflex  - on Ceftriaxone      Clostridium difficile infection  Recent Abx use and diarrhea.     - PO vancomycin 125 mg q6h started on 3/30 for 10 days of coverage after discontinuation of ctx.     Essential hypertension  Hypotensive so far. Holding entresto.       Acute idiopathic gout of left knee  Continue home allopurinol 200mg    - Prednisone 40 mg started on 3/31  - uric acid elevated 11.2  - Right knee pain      Elevated troponin I level  Pt with chronically elevated troponin   Troponin 0.082 on admission   EKG with no signs of acute ischemic change  No complaints of CP/discomfort     Plan  -STAT EKG, troponin if chest pain arises      Stage 3a chronic kidney  disease  Baseline Cr 1.3.       Long term current use of amiodarone    On amiodarone 300 daily, has hx of vtach    Mixed hyperlipidemia  Lipitor 80 daily      Presence of cardiac resynchronization therapy defibrillator (CRT-D)    Cardiac device check    History of DVT (deep vein thrombosis)  DVT in 2015      Hx pulmonary embolism 2010 provoked dvt   In 2010      Cardiomyopathy, ischemic  Hx of cardiomyopathy. Has [employer] AICD.       Chronic combined systolic and diastolic congestive heart failure  Echo 9/21 showed EF 10-20%  Home meds: K 20mEq daily, furosemide 40mg qd, met succ 50mg, entresto 49-51mg   CXR negative for cardiogenic pulmonary edema       Plan:  - Daily weights (standing if tolerated)  - Strict I/Os  - Fluid restriction at 1500mL  - Cardiac diet  - Daily CMP  - Verify with pharmacy correct dose of entresto. Patient unsure if increased to highest dose       Coronary artery disease involving native coronary artery of native heart without angina pectoris  Continue home ASA and statin       VTE Risk Mitigation (From admission, onward)         Ordered     heparin (porcine) injection 5,000 Units  Every 8 hours         03/29/22 0136     IP VTE HIGH RISK PATIENT  Once         03/29/22 0136     Place sequential compression device  Until discontinued         03/29/22 0136                Discharge Planning   MADHAVI: 4/1/2022     Code Status: Full Code   Is the patient medically ready for discharge?: No    Reason for patient still in hospital (select all that apply): Patient trending condition and Consult recommendations  Discharge Plan A: Skilled Nursing Facility   Discharge Delays: None known at this time              Uma Dupree MD  Department of Hospital Medicine   [facility] - Intensive Care ([place]-14)

## 2022-03-31 NOTE — ASSESSMENT & PLAN NOTE
Echo 9/21 showed EF 10-20%  Home meds: K 20mEq daily, furosemide 40mg qd, met succ 50mg, entresto 49-51mg   CXR negative for cardiogenic pulmonary edema       Plan:  - Daily weights (standing if tolerated)  - Strict I/Os  - Fluid restriction at 1500mL  - Cardiac diet  - Daily CMP  - Verify with pharmacy correct dose of entresto. Patient unsure if increased to highest dose

## 2022-03-31 NOTE — PLAN OF CARE
TALIA left voicemail for [employer] rep, Lupe ([phone]), to contact me regarding patient not going to SNF but going home with IV meds and HH.     TALIA left voicemail for [employer] rep, Samanta, [phone], to contact me about resuming services for patient.    Contacted patient's sister. She reported that if patient cannot go to [facility], they will go home with HH and Home IV Infusion for his Dobutumine.    Adwoa Wells, [facility]  [phone]

## 2022-03-31 NOTE — ASSESSMENT & PLAN NOTE
- right greater trochanter and post thigh injuries with partial thickness tissue loss.  - likely skin tears.  - right trochanter wound appears to be healing.   - cover with foam dressing and change 2x/weekly. Tues/Fri.  - nursing to maintain pressure injury prevention measures.

## 2022-03-31 NOTE — ASSESSMENT & PLAN NOTE
Pt with generalized abdominal pain on exam without TTP on exam.   Started on empiric treatment with metronidazole in ED    CT A/P showed Several diverticula in the descending and sigmoid colon with associated wall thickening, mild adjacent fat stranding, and prominence of the vasa recta.  Findings are slightly worse when compared with prior studies and may indicate early acute diverticulitis or colitis in the correct clinical setting.  Note that two separate areas involving the left colon, with a skip area of colon with no inflammatory changes between them, suggesting possible inflammatory colitis involving 2 separate areas in the left colon, more likely.    Plan:   -Metronidazole 500 q8hrs initially, then DC'd once C. Dif came back positive  -Changed to oral vanc once C. Dif resulted  -Low threshold to consult GI for further evaluation given CT A/P showing concern for developing inflammatory colitis

## 2022-03-31 NOTE — ASSESSMENT & PLAN NOTE
- pt evaluated for multiple areas of skin breakdown.  - stage 2 pressure injury noted to right heel.  - foam dressing applied. Change 2x/weekly on Tues/Fri.

## 2022-03-31 NOTE — ASSESSMENT & PLAN NOTE
Continue home allopurinol 200mg    - Prednisone 40 mg started on 3/31  - uric acid elevated 11.2  - Right knee pain

## 2022-03-31 NOTE — PROGRESS NOTES
Paged med 1  to make team aware of JULIETH PICC proximal/unused lumen flushing with no blod return noted. Requesting cathflo. Awaiting call back     [phone]  Dr. Gaxiola returned page ( See new orders)    [id number]  Secure chatted Dr. Gaxiola made aware of Pharmacy stating that Dobutamine and cathflo is not compatible, also due to unkown intersection of PICC lumens. pharmacy advised not to give. Pt is on continuous Dobutamine drip that is not supposed to be stopped.    Consulted with Dr. Gaxiola made aware. Dr. Gaxiola stated Ok to hold cathflo and that he will pass on to Primary team and confirm with PICC line team.

## 2022-03-31 NOTE — HPI
Audrey Oneil is a 71 year old male with PMHx of CAD, HFrEF (10-20%) on chronic dobutamine gtt, s/p AICD, h/o PE, HTN, HLD who presents to [facility] ED with acute generalized abdominal pain that started this afternoon after leaving therapy. Describes pain as dull, non radiating, generalized, 9/10 in severity. Complains of associated nausea but no episodes of emesis. Pt was found to be hypotensive in the ED on arrival. UA consistent with UTI and CT concerning for diverticulitis. Antibiotics were initiated and pt was admitted to hospital medicine. Wound care was consulted for evaluation of skin breakdown.

## 2022-03-31 NOTE — MEDICAL/APP STUDENT
Progress Note  Hospital Medicine                                                              [facility]: [facility]    Patient Name: Audrey Oneil Jr.  YOB: 1950    Admit Date: 3/28/2022                     LOS: 2    SUBJECTIVE:     Reason for Admission:  Severe sepsis    HPI: Mr. Oneil is a 71 M with PMHx of CAD, HFrEF (10-20%) on chronic dobutamine gtt, s/p AICD, h/o PE, HTN, HLD who [place] to [facility] with acute generalized abdominal pain that started this afternoon after leaving therapy. Describes pain as dull, non radiating, generalized, 9/10 in severity. Complains of associated nausea but no episodes of emesis. Reports not having a bowel movement since Saturday, but previously having diarrhea with intermittent occurences of benoit sized stool beforehand. Denies prior episode of similar abdominal pain. No history of abdominal surgeries. Of note, Patient had a cardiac arrest in January, 2022.     ED Course(3/28): Hypotensive (79/45 mmHg) on arrival, otherwise vitals stable. UA consistent with UTI. No leukocytosis on labs. Cr 1.6. Trop 0.082. . Hgb 11.8. CXR negative for acute cardiopulmonary process. CT A/P concerning for diverticulitis and Pyelo vs. ATN. Started on levofloxacin for UTI coverage given allergy to keflex. Started on metro for empiric treatment of diverticulitis     Hospital Course (3/30): He is in stable condition with support from continuous dobutamine infusion. He experienced hypotensive episode last evening (89/53 mmHg) leading to midodrine administration to reach goal MAP. Patient also increased oxygen requirements due to episode of SOB (O2 sat 87-89%). 2L NC applied during this episode. This morning he was no longer on oxygen and O2 saturation was normal. He complains of generalized tenderness across the abdomen with the worst pain being in the suprapubic/infraumbilical region. He also complained of lower to mid back pain radiating from the suprapubic area at night  time that has now resolved when I saw him. This sounds  tract/kidney related and/or complications of the diverticulitis. He rates his pain as 7/10 despite pain management. He had a loose bowel movement during the early morning. Denies n/v. He has not been eating well attributing to lack of appetite at this time. He continues to mention some minor wheezing, but has not worsened since yesterday.      Urine culture shows growth of gram negative rods and >106923 cfu. Identification and susceptibility still pending on UCx. On ceftriaxone for coverage, levofloxacin started in ER was discontinued. Metronidazole continued due to CT/AP concerning for diverticulitis.     Interval history(today 3/31): He is in stable condition with support from continuous dobutamine infusion. He experienced hypotensive episodes last evening (lowest 77/50 mmHg). Patient also increased oxygen requirements currently on 1.5 L NC continuously (o2 sat 92-99% on NC). He states improving generalized tenderness across the abdomen with the worst pain being in the hypogastric region which could be from  tract infection or some attribution to the possible rectus sheath hematoma noted on CT A/P on 03/29. He complains of right flank pain now. He rates his pain as 6/10 improved with pain medications. He states his stool consistency is becoming more solid, but still loser than normal. Denies n/v. He has been eating better. Patient complains of gout flare up in R knee which is making mobility more difficult. States he has been on colchicine and allopurinol in prior admissions. Denies any pain at PICC line site. Nursing overnight had flushed the unused port and found no withdrawal of blood.     Urine culture shows growth of E. coli and >558626 cfu. Identification and susceptibility on UCx indicate sensitivy to ceftriaxone. C Difficile PCR and antigen positive, toxin negative. Began on oral vancomycin yesterday 125 mg q6h. Metronidazole discontinued as oral  vancomycin is preferred for C. Difficile colitis.      OBJECTIVE:     Vital Signs Range (Last 24H):  Temp:  [97.6 °F (36.4 °C)-98.5 °F (36.9 °C)]   Pulse:  [78-90]   Resp:  [18]   BP: ()/(49-58)   SpO2:  [90 %-99 %] Body mass index is 33.05 kg/m².  Wt Readings from Last 3 Encounters:   03/28/22 1631 95.7 kg (211 lb)   12/12/21 1959 109.8 kg (242 lb)   12/12/21 0021 109.8 kg (242 lb)   10/27/21 1503 108 kg (238 lb)       I & O (Last 24H):      Intake/Output Summary (Last 24 hours) at 3/31/2022 1220  Last data filed at 3/31/2022 0622  Gross per 24 hour   Intake 40.48 ml   Output 325 ml   Net -284.52 ml       Physical Exam:  General: appears stated age, fatigued, moderately obese  HENT: Head:normocephalic, atraumatic. Ears:hearing grossly normal bilaterally. Nose: Nares normal. Septum midline. Mucosa normal. No drainage or sinus tenderness.. Throat: no throat erythema, normal findings: lips normal without lesions, gums healthy, tongue midline and normal, soft palate, uvula, and tonsils normal and oropharynx pink & moist without lesions or evidence of thrush and abnormal findings: dentition: poor.  Eyes: conjunctivae/corneas clear. PERRL.   Neck: no JVD and supple, symmetrical, trachea midline, no JVD  Lungs:  clear to auscultation bilaterally, normal respiratory effort and on 1.5L O2 NC  Cardiovascular: Heart: regular rate and rhythm, S1, S2 normal and murmur from yesterday not apparent on today's exam. Chest Wall: no tenderness. Extremities: no cyanosis or edema, or clubbing. Pulses: 2+ and symmetric.  Abdomen/Rectal: Abdomen: normal findings: bowel sounds normal, no masses palpable and no organomegaly and abnormal findings:  distended, obese and moderate tenderness in the lower abdomen and in the right flank. Rectal: normal tone, no masses or tenderness and not examined  Skin: nails normal without clubbing and temperature normal or ecchymoses - arm(s) bilateral, abdomen, lower leg(s) bilateral and nails are  pale correlated with anemia  Musculoskeletal:R knee tenderness to palpation and active ROM  Lymph Nodes: No cervical or supraclavicular adenopathy  Neurologic: Mental status: Alert, oriented, thought content appropriate  Cranial nerves: II: pupils equal, round, reactive to light and accommodation, III,IV,VI: extraocular muscles extra-ocular motions intact, VII: lower facial muscle function normal bilaterally  Motor:grossly normal  some R hand  weakness no change from yesterday's exam.  Psych/Behavioral:  Alert and oriented, appropriate affect.    Diagnostic Results:  Lab Results   Component Value Date    WBC 9.07 03/31/2022    HGB 11.3 (L) 03/31/2022    HCT 36.1 (L) 03/31/2022    MCV 93 03/31/2022     03/31/2022     Recent Labs   Lab 03/31/22  0317 03/31/22  1223   GLU 80 95    134*   K 4.3 4.6    100   CO2 21* 25   BUN 38* 35*   CREATININE 2.8* 2.5*   CALCIUM 9.2 8.9   MG 1.8  --      Recent Lab Results       03/31/22  1223   03/31/22  1043   03/31/22  0317   03/30/22  1454        Albumin     2.6         Alkaline Phosphatase     86         ALT     5         Anion Gap 9     14         AST     11         Baso #     0.06         Basophil %     0.7         BILIRUBIN TOTAL     0.2  Comment: For infants and newborns, interpretation of results should be based  on gestational age, weight and in agreement with clinical  observations.    Premature Infant recommended reference ranges:  Up to 24 hours.............<8.0 mg/dL  Up to 48 hours............<12.0 mg/dL  3-5 days..................<15.0 mg/dL  6-29 days.................<15.0 mg/dL           BUN 35     38         Calcium 8.9     9.2         Chloride 100     102         CO2 25     21         Creatinine 2.5     2.8         Creatinine, Urine       101.0       Differential Method     Automated         eGFR if  29.2     25.5         eGFR if non  25.3  Comment: Calculation used to obtain the estimated glomerular  filtration  rate (eGFR) is the CKD-EPI equation.        22.0  Comment: Calculation used to obtain the estimated glomerular filtration  rate (eGFR) is the CKD-EPI equation.            Eos #     0.5         Eosinophil %     5.4         Glucose 95     80         Gran # (ANC)     5.4         Gran %     59.1         Hematocrit     36.1         Hemoglobin     11.3         Immature Grans (Abs)     0.12  Comment: Mild elevation in immature granulocytes is non specific and   can be seen in a variety of conditions including stress response,   acute inflammation, trauma and pregnancy. Correlation with other   laboratory and clinical findings is essential.           Immature Granulocytes     1.3         Lymph #     2.4         Lymph %     26.4         Magnesium     1.8         MCH     29.2         MCHC     31.3         MCV     93         Mono #     0.6         Mono %     7.1         MPV     10.0         nRBC     0         Phosphorus     5.1         Platelets     293         Potassium 4.6     4.3         PROTEIN TOTAL     5.9         RBC     3.87         RDW     16.0         Sodium 134     137         Uric Acid   11.2           Urine Urea Nitrogen       196  Comment: The random urine reference ranges provided were established   for 24 hour urine collections.  No reference ranges exist for  random urine specimens.  Correlate clinically.         WBC     9.07                     ASSESSMENT/PLAN:     Current Problems List:  Active Hospital Problems    Diagnosis  POA    *Severe sepsis [A41.9, R65.20]  Unknown    Clostridium difficile infection [A49.8]  Yes    Diverticulitis [K57.92]  Unknown    Essential hypertension [I10]  Yes     Chronic    Chronic gout [M1A.9XX0]  Yes     Chronic    Elevated troponin I level [R77.8]  Unknown    Diverticula of colon [K57.30]  Unknown    Chronic combined systolic and diastolic congestive heart failure [I50.42]  Yes     11/30/15 Echo:  1 - Eccentric LVH with severely depressed left  ventricular systolic function (EF 15-20%).   2 - Mild left atrial enlargement.   3 - Left ventricular diastolic dysfunction.   4 - Normal right ventricular systolic function .       Coronary artery disease involving native coronary artery of native heart without angina pectoris [I25.10]  Yes     Chronic     Cath 10- Stents patent non-obstructive disease  Cath 11-12015 non-obstructive disease        Resolved Hospital Problems   No resolved problems to display.       Active Problems, Status & Treatment Plan Addressed Today:    1. Sepsis    This patient does have evidence of infective focus  My overall impression is sepsis. Vital signs were reviewed and noted in progress note.  Antibiotics given- ceftriaxone for  tract infection coverage (E. Coli on UCx sensitive to ceftriaxone) and vancomycin for C. Difficile.     Antibiotics (From admission, onward)            Start     Stop Route Frequency Ordered    03/31/22 0000  vancomycin 125 mg/5 mL oral solution 125 mg  (C. difficile Infection (CDI) Treatment Order Panel)         04/14 2359 Oral Every 6 hours 03/30/22 1532    03/29/22 1315  cefTRIAXone (ROCEPHIN) 2 g/50 mL D5W IVPB         -- IV Every 24 hours (non-standard times) 03/29/22 1314    03/29/22 0900  mupirocin 2 % ointment         04/03 0859 Nasl 2 times daily 03/29/22 0139        Cultures were taken-   Microbiology Results (last 7 days)     Procedure Component Value Units Date/Time    Urine culture [[id number]]  (Abnormal)  (Susceptibility) Collected: 03/28/22 1658    Order Status: Completed Specimen: Urine Updated: 03/31/22 0119     Urine Culture, Routine ESCHERICHIA COLI  >100,000 cfu/ml      Narrative:      Specimen Source->Urine    Blood culture x two cultures. Draw prior to antibiotics. [[id number]] Collected: 03/28/22 1659    Order Status: Completed Specimen: Blood from Peripheral, Hand, Left Updated: 03/30/22 2012     Blood Culture, Routine No Growth to date      No Growth to date      No Growth to  date    Narrative:      Aerobic and anaerobic    Blood culture x two cultures. Draw prior to antibiotics. [[id number]] Collected: 03/28/22 1659    Order Status: Completed Specimen: Blood from Peripheral, Hand, Left Updated: 03/30/22 2012     Blood Culture, Routine No Growth to date      No Growth to date      No Growth to date    Narrative:      Aerobic and anaerobic    C Diff Toxin by PCR [[id number]]  (Abnormal) Collected: 03/29/22 2259    Order Status: Completed Updated: 03/30/22 1533     C. diff PCR Positive    Clostridium difficile EIA [[id number]]  (Abnormal) Collected: 03/29/22 2259    Order Status: Completed Specimen: Stool Updated: 03/30/22 1420     C. diff Antigen Positive     C difficile Toxins A+B, EIA Negative     Comment: Testing not recommended for children <24 months old.       Clostridium difficile EIA [[id number]]     Order Status: Canceled Specimen: Stool         Latest lactate reviewed, they are-  Recent Labs   Lab 03/28/22  1700 03/28/22  2115   LACTATE 1.8 0.8       Organ dysfunction indicated by Acute kidney injury and this seems more likely due to hypoperfusion exacerbated by the HFrEF  Source- C. Diff colitis v  tract infection    Plan:    -continue abx ceftriaxone IV 2g q24 and vancomycin 125mg q6h  -f/u BCx for any growth   -strict I/Os  -monitoring with telemetry  -Goal MAP is >65   -source still not thoroughly identified, C. Diff colitis v pyelonephritis v  tract infection ; serial abdominal exams, possibly order CT renal scan if BCx comes back negative.       2. C. Diff colitis    CT A/P showed diverticula in descending and sigmoid colon with wall thickening. Confirmed C. diff infection. Correlates with colitis on CT A/P    Plan:    -oral vancomycin 125mg q6h  -serial abdominal examination  -GI consult if abdominal pain not resolving after  tract infection resolved.    3.Essential Hypertension    Plan:     -Continue home medications  -discontinue entresto for now. Patient not  experiencing htn to require treatment at this time    4. Gout    Acute R knee flare.     Plan: Continue home allopurinol dose.     5. Chronic combined systolic and diastolic congestive heart failure    Recent echo 9/21 showed EF 15%  Continue home meds: K 20mEq, met succ 50 mg, amiodarone 300 mg  Hold home med: entresto (hypotensive overnight)  Discontinue midronine    6. LAVERNE Stage 1    Plan:       -daily weights  -strict I/Os   -fluid restriction 1500mL   -cardiac diet  -K 20mEq daily  -daily CMP  -entresto dose, held for now  -administer 250mL LR bolus, assess for improvement if cause of LAVERNE is pre-renal hypoperfusion related.    6. Coronary artery disease involving native coronary artery of native heart without angina pectoris    Continue home ASA and statin    VTE Risk Mitigation (From admission, onward)         Ordered     heparin (porcine) injection 5,000 Units  Every 8 hours         03/29/22 0136     IP VTE HIGH RISK PATIENT  Once         03/29/22 0136     Place sequential compression device  Until discontinued         03/29/22 0136                  Signing:  Tre Lynn

## 2022-03-31 NOTE — PROGRESS NOTES
SBAR hand off taken from Adelfo RN. Pt is awake and alert in bed, AOX4 able to make needs known. Family at bedside. Pt denies having any pain at this time. Dobutamine infusing @ 2.5mcg/kg/min via JULIETH dbl lumen PICC. Dressing c/d/i. Proximal/unused  lumen flushes, no blood return noted. Will notify team to request cathflo. Pace maker Left chest , pacing noted on the monitor...Questions/concerns addressed. Safety measures in place. Bedside table, hand held and belongings are within reach. NAD noted. Will continue to monitor.

## 2022-03-31 NOTE — SUBJECTIVE & OBJECTIVE
Interval History: NAOE, hypotensive, gave 250 cc IVF, improved Cr. Started prednisone 40 mg daily for gout flare.     Review of Systems   Constitutional:  Negative for chills, diaphoresis and fever.   HENT:  Negative for postnasal drip, sinus pressure and trouble swallowing.    Eyes:  Negative for visual disturbance.   Respiratory:  Negative for shortness of breath.    Cardiovascular:  Negative for chest pain and palpitations.   Gastrointestinal:  Positive for abdominal pain, diarrhea and nausea. Negative for constipation and vomiting.   Genitourinary:  Positive for dysuria.   Musculoskeletal:  Negative for back pain.   Skin:  Negative for rash.   Allergic/Immunologic: Negative for immunocompromised state.   Neurological:  Negative for syncope and headaches.   Hematological:  Does not bruise/bleed easily.   Psychiatric/Behavioral:  Negative for confusion and decreased concentration.    Objective:     Vital Signs (Most Recent):  Temp: 97.8 °F (36.6 °C) (03/31/22 1330)  Pulse: 85 (03/31/22 1515)  Resp: 18 (03/31/22 1215)  BP: (!) 107/53 (03/31/22 1215)  SpO2: 98 % (03/31/22 1515)   Vital Signs (24h Range):  Temp:  [97.8 °F (36.6 °C)-98.5 °F (36.9 °C)] 97.8 °F (36.6 °C)  Pulse:  [83-93] 85  Resp:  [18] 18  SpO2:  [90 %-100 %] 98 %  BP: ()/(50-58) 107/53     Weight: 95.7 kg (211 lb)  Body mass index is 33.05 kg/m².    Intake/Output Summary (Last 24 hours) at 3/31/2022 1605  Last data filed at 3/31/2022 0622  Gross per 24 hour   Intake 40.48 ml   Output 325 ml   Net -284.52 ml      Physical Exam  Vitals and nursing note reviewed.   Constitutional:       Appearance: He is obese.   HENT:      Head: Normocephalic and atraumatic.      Nose: Nose normal.      Mouth/Throat:      Mouth: Mucous membranes are moist.      Pharynx: No oropharyngeal exudate or posterior oropharyngeal erythema.   Eyes:      General: No scleral icterus.  Cardiovascular:      Rate and Rhythm: Normal rate and regular rhythm.      Pulses: Normal  pulses.      Heart sounds: Normal heart sounds. No murmur heard.    No friction rub. No gallop.   Pulmonary:      Effort: Pulmonary effort is normal. No respiratory distress.      Breath sounds: Normal breath sounds. No wheezing, rhonchi or rales.   Abdominal:      General: Bowel sounds are normal. There is distension.      Palpations: Abdomen is soft.      Tenderness: There is no abdominal tenderness. There is no guarding.   Musculoskeletal:         General: Normal range of motion.      Cervical back: Normal range of motion.      Right lower leg: No edema.      Left lower leg: No edema.      Comments: Medial right knee pain. No swelling, erythema, warmth, wound   Skin:     General: Skin is warm and dry.      Capillary Refill: Capillary refill takes less than 2 seconds.      Findings: No bruising.   Neurological:      General: No focal deficit present.      Mental Status: He is alert and oriented to person, place, and time.   Psychiatric:         Mood and Affect: Mood normal.         Behavior: Behavior normal.       Significant Labs: All pertinent labs within the past 24 hours have been reviewed.  CBC:   Recent Labs   Lab 03/30/22 0318 03/31/22 0317   WBC 10.37 9.07   HGB 12.6* 11.3*   HCT 39.6* 36.1*    293     CMP:   Recent Labs   Lab 03/30/22 0318 03/31/22 0317 03/31/22  1223    137 134*   K 4.5 4.3 4.6    102 100   CO2 21* 21* 25   GLU 90 80 95   BUN 33* 38* 35*   CREATININE 2.5* 2.8* 2.5*   CALCIUM 9.4 9.2 8.9   PROT 6.2 5.9*  --    ALBUMIN 2.6* 2.6*  --    BILITOT 0.3 0.2  --    ALKPHOS 101 86  --    AST 12 11  --    ALT 7* 5*  --    ANIONGAP 15 14 9   EGFRNONAA 25.3* 22.0* 25.3*       Significant Imaging: I have reviewed all pertinent imaging results/findings within the past 24 hours.

## 2022-03-31 NOTE — ASSESSMENT & PLAN NOTE
Urine culture with E. Coli sensitive to erta, janel, ctx, cefazolin, bactrim.     - patient allergic to keflex  - on Ceftriaxone

## 2022-03-31 NOTE — ASSESSMENT & PLAN NOTE
71 M admitted with urosepsis. Associated symptoms include generalized abdominal. HD stable. QSOFA: hypotension (sBP < 100). WBC 11. Lactate 1.8. UA consistent with UTI. CXR negative. CT A/P showed diverticulitis along with concern for ATN vs. Pyelonephritis.   Patient has a chronic PICC line placed for dobutamine infusion that was clean, dry, without signs of infection.     DDx: urosepsis, pyelonephritis, acute mesenteric ischemia, bacteremia, diverticulitis. Lower concern for pyelonephritis given no leukocytosis and constitutional symptoms prior to admission. Less concerned for acute mesenteric ischemia given no pain on PO intake along with decrease in generalized pain since presentation to ED.       Plan:  - Received levofloxacin 750 mg x 1 dose in ED.   - Ceftriaxone started on 3/29  - urine cx:E. Coli sensitive to ceftriaxone.   - Telemetry  - F/u BCx, UCx  - Strict I/Os  - Goal MAP >65

## 2022-03-31 NOTE — SUBJECTIVE & OBJECTIVE
Scheduled Meds:   allopurinoL  200 mg Oral Daily    alteplase  2 mg Intra-Catheter Once    amiodarone  300 mg Oral Daily    aspirin  81 mg Oral Daily    atorvastatin  80 mg Oral Daily    cefTRIAXone (ROCEPHIN) IVPB  2 g Intravenous Q24H    heparin (porcine)  5,000 Units Subcutaneous Q8H    [START ON 4/1/2022] metoprolol succinate  25 mg Oral Daily    miconazole NITRATE 2 %   Topical (Top) BID    miconazole nitrate 2%   Topical (Top) BID    mupirocin   Nasal BID    predniSONE  40 mg Oral Daily    vancomycin  125 mg Oral Q6H     Continuous Infusions:   DOBUTamine IV infusion (non-titrating) 2.5 mcg/kg/min (03/29/22 0705)     PRN Meds:acetaminophen, albuterol-ipratropium, dextrose 10%, dextrose 10%, diphenhydrAMINE, glucagon (human recombinant), glucose, glucose, melatonin, naloxone, oxyCODONE-acetaminophen, prochlorperazine, simethicone, sodium chloride 0.9%    Review of patient's allergies indicates:   Allergen Reactions    Iodine containing multivitamin Swelling     itching    Keflex [cephalexin] Swelling     Eyes.  Tolerated multiple doses of zosyn and 1 dose of augmentin in 2015 and 2016, respectively    Peaches [peach (prunus persica)] Swelling     eyes    Shellfish containing products Swelling    Fig tree Swelling     itching    Tuberculin spenser test ppd Rash        Past Medical History:   Diagnosis Date    Acute hypoxemic respiratory failure 11/7/2015    Acute idiopathic gout of left knee 12/2/2019    Acute idiopathic gout of right elbow 9/23/2021    AICD (automatic cardioverter/defibrillator) present 12/13/2015      Anticoagulant long-term use     Bronchopneumonia 12/20/2021    CHF (congestive heart failure)     Chronic anticoagulation 5/5/2016    Chronic combined systolic and diastolic heart failure 11/26/2012    EF 10-20% on ECHO 2013    Chronic gout 12/2/2019    Clotting disorder     Coronary artery disease involving native coronary artery of native heart without angina pectoris 11/26/2012    Cath  10- Stents patent non-obstructive disease Cath 11-82018 non-obstructive disease     COVID-19 08/2021    Had antibody infusion    Diverticulosis of colon     DVT (deep venous thrombosis), unspecified laterality 11/12/2015    Dysphonia 1/28/2018    Essential hypertension 11/15/2015    Fine motor impairment 2/2/2021    Hyperlipidemia     Hypertensive heart disease with heart failure 5/5/2016    MI (myocardial infarction) 2009    x's 5    Nicotine abuse     Obesity 11/26/2012    Olecranon bursitis of right elbow 9/19/2021    Pulmonary embolism 2011    Renal disorder     LAVERNE    Right carpal tunnel syndrome 4/6/2018    Stented coronary artery 11/26/2012    LAD stent placed 10/17/2007     Trigger thumb of right hand 4/6/2018     Past Surgical History:   Procedure Laterality Date    APPENDECTOMY      BACK SURGERY      CARDIAC DEFIBRILLATOR PLACEMENT      CARDIAC SURGERY  2007    stent    CARPAL TUNNEL RELEASE Right 04/2018    INSERTION OF BIVENTRICULAR IMPLANTABLE CARDIOVERTER-DEFIBRILLATOR (ICD) N/A 5/3/2019    Procedure: INSERTION, ICD, BIVENTRICULAR;  Surgeon: Teofilo Pal MD;  Location: [facility] EP LAB;  Service: Cardiology;  Laterality: N/A;  ICH CM,  ICD UPGD BI-V,  SJM, MAC, FAS, 3PREP (dual ICD INSITU)    r knee scope      REVISION OF SKIN POCKET FOR CARDIOVERTER-DEFIBRILLATOR Left 5/3/2019    Procedure: Revision, Skin Pocket, For Cardioverter-Defibrillator;  Surgeon: Teofilo Pal MD;  Location: [facility] EP LAB;  Service: Cardiology;  Laterality: Left;    RIGHT HEART CATHETERIZATION Right 6/14/2021    Procedure: INSERTION, CATHETER, RIGHT HEART;  Surgeon: Lizz Nieto MD;  Location: [facility] CATH LAB;  Service: Cardiology;  Laterality: Right;    SPINE SURGERY      TONSILLECTOMY      TRIGGER FINGER RELEASE Right 04/2018    thumb       Family History       Problem Relation (Age of Onset)    Cancer Mother, Paternal Grandmother    Colon polyps Father    Diabetes Mother    Heart disease Mother, Father    No  Known Problems Sister, Daughter, Son, Sister, Son    Stroke Father          Tobacco Use    Smoking status: Former Smoker     Packs/day: 1.00     Years: 45.00     Pack years: 45.00     Types: Cigarettes     Quit date: 2005     Years since quittin.3    Smokeless tobacco: Never Used    Tobacco comment: 1-1.5 ppd every day.   Substance and Sexual Activity    Alcohol use: No    Drug use: No    Sexual activity: Never     Review of Systems   Skin:  Positive for wound.     Objective:     Vital Signs (Most Recent):  Temp: 97.8 °F (36.6 °C) (22 1215)  Pulse: 93 (22 1250)  Resp: 18 (22 0531)  BP: (!) 107/53 (22 1215)  SpO2: 100 % (22 1250)   Vital Signs (24h Range):  Temp:  [97.6 °F (36.4 °C)-98.5 °F (36.9 °C)] 97.8 °F (36.6 °C)  Pulse:  [78-93] 93  Resp:  [18] 18  SpO2:  [90 %-100 %] 100 %  BP: ()/(49-58) 107/53     Weight: 95.7 kg (211 lb)  Body mass index is 33.05 kg/m².  Physical Exam  Constitutional:       Appearance: Normal appearance.   Skin:     General: Skin is warm and dry.      Findings: Lesion present.   Neurological:      Mental Status: He is alert.       Laboratory:  All pertinent labs reviewed within the last 24 hours.    Diagnostic Results:  None

## 2022-03-31 NOTE — PROGRESS NOTES
Cardiac device interrogation and/or reprogramming completed by industry representative, Alexy with [employer]; please refer to report located in the Media tab.

## 2022-03-31 NOTE — ASSESSMENT & PLAN NOTE
Recent Abx use and diarrhea.     - PO vancomycin 125 mg q6h started on 3/30 for 10 days of coverage after discontinuation of ctx.

## 2022-03-31 NOTE — PROGRESS NOTES
SBAR hand off given to Kay TALAMANTES. Pt is awake and alert in bed. Family at bedside. Dobutamine infusing. NAD noted.Care transferred.

## 2022-03-31 NOTE — ASSESSMENT & PLAN NOTE
- buttocks/groin/scrotum with redness/irritation likely due to moisture/fungal infection.  - continue miconazole cream bid/prn cleansing.

## 2022-03-31 NOTE — CONSULTS
[facility] ([place] [id number])  [facility]  Consult Note    Patient Name: Audrey Oneil Jr.  MRN: [id number]  Admission Date: 3/28/2022  Hospital Length of Stay: 2 days  Attending Physician: Jaxson Santo MD  Primary Care Provider: January Khan MD     Consults  Subjective:     History of Present Illness:  Audrey Oneil is a 71 year old male with PMHx of CAD, HFrEF (10-20%) on chronic dobutamine gtt, s/p AICD, h/o PE, HTN, HLD who [place] [facility] [place] acute generalized abdominal pain that started this afternoon after leaving therapy. Describes pain as dull, non radiating, generalized, 9/10 in severity. Complains of associated nausea but no episodes of emesis. Pt was found to be hypotensive in the ED on arrival. UA consistent with UTI and CT concerning for diverticulitis. Antibiotics were initiated and pt was admitted to hospital medicine. Wound care was consulted for evaluation of skin breakdown.      Scheduled Meds:   allopurinoL  200 mg Oral Daily    alteplase  2 mg Intra-Catheter Once    amiodarone  300 mg Oral Daily    aspirin  81 mg Oral Daily    atorvastatin  80 mg Oral Daily    cefTRIAXone (ROCEPHIN) IVPB  2 g Intravenous Q24H    heparin (porcine)  5,000 Units Subcutaneous Q8H    [START ON 4/1/2022] metoprolol succinate  25 mg Oral Daily    miconazole NITRATE 2 %   Topical (Top) BID    miconazole nitrate 2%   Topical (Top) BID    mupirocin   Nasal BID    predniSONE  40 mg Oral Daily    vancomycin  125 mg Oral Q6H     Continuous Infusions:   DOBUTamine IV infusion (non-titrating) 2.5 mcg/kg/min (03/29/22 0705)     PRN Meds:acetaminophen, albuterol-ipratropium, dextrose 10%, dextrose 10%, diphenhydrAMINE, glucagon (human recombinant), glucose, glucose, melatonin, naloxone, oxyCODONE-acetaminophen, prochlorperazine, simethicone, sodium chloride 0.9%    Review of patient's allergies indicates:   Allergen Reactions    Iodine containing multivitamin Swelling     itching     Keflex [cephalexin] Swelling     Eyes.  Tolerated multiple doses of zosyn and 1 dose of augmentin in 2015 and 2016, respectively    Peaches [peach (prunus persica)] Swelling     eyes    Shellfish containing products Swelling    Fig tree Swelling     itching    Tuberculin spenser test ppd Rash        Past Medical History:   Diagnosis Date    Acute hypoxemic respiratory failure 11/7/2015    Acute idiopathic gout of left knee 12/2/2019    Acute idiopathic gout of right elbow 9/23/2021    AICD (automatic cardioverter/defibrillator) present 12/13/2015      Anticoagulant long-term use     Bronchopneumonia 12/20/2021    CHF (congestive heart failure)     Chronic anticoagulation 5/5/2016    Chronic combined systolic and diastolic heart failure 11/26/2012    EF 10-20% on ECHO 2013    Chronic gout 12/2/2019    Clotting disorder     Coronary artery disease involving native coronary artery of native heart without angina pectoris 11/26/2012    Cath 10- Stents patent non-obstructive disease Cath 11-12015 non-obstructive disease     COVID-19 08/2021    Had antibody infusion    Diverticulosis of colon     DVT (deep venous thrombosis), unspecified laterality 11/12/2015    Dysphonia 1/28/2018    Essential hypertension 11/15/2015    Fine motor impairment 2/2/2021    Hyperlipidemia     Hypertensive heart disease with heart failure 5/5/2016    MI (myocardial infarction) 2009    x's 5    Nicotine abuse     Obesity 11/26/2012    Olecranon bursitis of right elbow 9/19/2021    Pulmonary embolism 2011    Renal disorder     LAVERNE    Right carpal tunnel syndrome 4/6/2018    Stented coronary artery 11/26/2012    LAD stent placed 10/17/2007     Trigger thumb of right hand 4/6/2018     Past Surgical History:   Procedure Laterality Date    APPENDECTOMY      BACK SURGERY      CARDIAC DEFIBRILLATOR PLACEMENT      CARDIAC SURGERY  2007    stent    CARPAL TUNNEL RELEASE Right 04/2018    INSERTION OF  BIVENTRICULAR IMPLANTABLE CARDIOVERTER-DEFIBRILLATOR (ICD) N/A 5/3/2019    Procedure: INSERTION, ICD, BIVENTRICULAR;  Surgeon: Teofilo Pal MD;  Location: [facility] EP LAB;  Service: Cardiology;  Laterality: N/A;  ICH CM,  ICD UPGD BI-V,  SJM, MAC, FAS, 3PREP (dual ICD INSITU)    r knee scope      REVISION OF SKIN POCKET FOR CARDIOVERTER-DEFIBRILLATOR Left 5/3/2019    Procedure: Revision, Skin Pocket, For Cardioverter-Defibrillator;  Surgeon: Teofilo Pal MD;  Location: [facility] EP LAB;  Service: Cardiology;  Laterality: Left;    RIGHT HEART CATHETERIZATION Right 2021    Procedure: INSERTION, CATHETER, RIGHT HEART;  Surgeon: Lizz Nieto MD;  Location: [facility] CATH LAB;  Service: Cardiology;  Laterality: Right;    SPINE SURGERY      TONSILLECTOMY      TRIGGER FINGER RELEASE Right 2018    thumb       Family History       Problem Relation (Age of Onset)    Cancer Mother, Paternal Grandmother    Colon polyps Father    Diabetes Mother    Heart disease Mother, Father    No Known Problems Sister, Daughter, Son, Sister, Son    Stroke Father          Tobacco Use    Smoking status: Former Smoker     Packs/day: 1.00     Years: 45.00     Pack years: 45.00     Types: Cigarettes     Quit date: 2005     Years since quittin.3    Smokeless tobacco: Never Used    Tobacco comment: 1-1.5 ppd every day.   Substance and Sexual Activity    Alcohol use: No    Drug use: No    Sexual activity: Never     Review of Systems   Skin:  Positive for wound.     Objective:     Vital Signs (Most Recent):  Temp: 97.8 °F (36.6 °C) (22 1215)  Pulse: 93 (22 1250)  Resp: 18 (22 0531)  BP: (!) 107/53 (22 1215)  SpO2: 100 % (22 1250)   Vital Signs (24h Range):  Temp:  [97.6 °F (36.4 °C)-98.5 °F (36.9 °C)] 97.8 °F (36.6 °C)  Pulse:  [78-93] 93  Resp:  [18] 18  SpO2:  [90 %-100 %] 100 %  BP: ()/(49-58) 107/53     Weight: 95.7 kg (211 lb)  Body mass index is 33.05 kg/m².  Physical  Exam  Constitutional:       Appearance: Normal appearance.   Skin:     General: Skin is warm and dry.      Findings: Lesion present.   Neurological:      Mental Status: He is alert.       Laboratory:  All pertinent labs reviewed within the last 24 hours.    Diagnostic Results:  None        Assessment/Plan:          [facility] Evaluation    Skin Integrity [facility] evaluation of patient as part of the comprehensive skin care team.   He has been admitted for 3 days. Skin injury was noted on 3/29/22. POA yes.      Groin      R greater trochanter        R heel      R posterior thigh            Skin breakdown  - right greater trochanter and post thigh injuries with partial thickness tissue loss.  - likely skin tears.  - right trochanter wound appears to be healing.   - cover with foam dressing and change 2x/weekly. Tues/Fri.  - nursing to maintain pressure injury prevention measures.     Dermatitis associated with moisture  - buttocks/groin/scrotum with redness/irritation likely due to moisture/fungal infection.  - continue miconazole cream bid/prn cleansing.    Pressure injury of heel, stage 2  - pt evaluated for multiple areas of skin breakdown.  - stage 2 pressure injury noted to right heel.  - foam dressing applied. Change 2x/weekly on Tues/Fri.          Thank you for your consult. I will follow-up with patient. Please contact us if you have any additional questions.         Gillian Cedeno NP  [facility]  [facility] ([place]

## 2022-04-01 NOTE — ASSESSMENT & PLAN NOTE
Continue home allopurinol 200mg    - Prednisone 40 mg started on 3/31. Will DC with 7 days of prednisone  - avoiding colchicine due to LAVERNE  - uric acid elevated 11.2  - Right knee pain

## 2022-04-01 NOTE — DISCHARGE SUMMARY
[facility] ([address] Medicine  Discharge Summary      Patient Name: Audrey Oneil Jr.  MRN: [id number]  Patient Class: IP- Inpatient  Admission Date: 3/28/2022  Hospital Length of Stay: 3 days  Discharge Date and Time:  04/01/2022 1:19 PM  Attending Physician: Jaxson Santo MD   Discharging Provider: Uma Dupree MD  Primary Care Provider: January Khan MD  [place]ospital Medicine [place]: [facility] Uma Dupree MD    HPI:   Mr. Oneil is a 71 M with PMHx of CAD, HFrEF (10-20%) on chronic dobutamine gtt, s/p AICD, h/o PE, HTN, HLD who [place] [facility] with acute generalized abdominal pain that started this afternoon after leaving therapy. Describes pain as dull, non radiating, generalized, 9/10 in severity. Complains of associated nausea but no episodes of emesis. Reports not having a bowel movement since Saturday, but previously having diarrhea with intermittent occurences of benoit sized stool beforehand. Denies prior episode of similar abdominal pain. No history of abdominal surgeries. Of note, Patient had a cardiac arrest in January, 2022.     ED Course: Hypotensive ( 79/45 mmHg) on arrival, otherwise vitals stable. UA consistent with UTI. No leukocytosis on labs. Cr 1.6. Trop 0.082. . Hgb 11.8. CXR negative for acute cardiopulmonary process. CT A/P concerning for diverticulitis and Pyelo vs. ATN. Started on levofloxacin for UTI coverage given allergy to keflex. Started on metro for empiric treatment of diverticulitis       * No surgery found *      Hospital Course:   71 M with PMHx of CAD, HFrEF (10-20%) on chronic dobutamine gtt, s/p AICD, h/o PE, HTN, HLD who is being treated for sepsis 2/2 to UTI and C. Diff diarrhea. Initially given levofloxacin. Then started on Ceftriaxone and flagyl. Oral vancomycin q6h x10 days started on 3/30. No hx of previous C. Diff infection per chart review. Cr increased to 2.5 (baseline 1-1.3), urine lytes ordered. Holding home entresto due to  hypotension. Device check on 3/31, heart transplant consulted for management of dobutamine. Uric acid elevated, patient c/o right knee pain, concerned for gout flare. Uric acid elevated at 11.2, started on prednisone 40 daily. Stopped PO lasix due to worsening of LAVERNE. Gave 250 cc IVF with mild improvement in Cr 2.8>> 2.5. Diarrhea improved, now soft, still had 4 BM/day. Abdominal pain improved. DC to SNF once secured or HH with infusion for dobutamine. Entresto and lasix held at discharge. CMP for next week. Will need f/u with heart failure clinic for dobutamine and GDMT management.        Goals of Care Treatment Preferences:  Code Status: Full Code     Vitals:    04/01/22 0745 04/01/22 0851 04/01/22 1054 04/01/22 1140   BP: 130/64 130/64  (!) 89/53   BP Location: Right arm   Right arm   Patient Position: Lying   Lying   Pulse: 82   92   Resp: 18      Temp: 97.4 °F (36.3 °C)   98.3 °F (36.8 °C)   TempSrc: Oral   Oral   SpO2: 98%  98% (!) 94%   Weight:       Height:           Physical Exam  Constitutional:       Appearance: Normal appearance.   HENT:      Head: Normocephalic and atraumatic.      Nose: Nose normal.      Mouth/Throat:      Mouth: Mucous membranes are moist.   Eyes:      General: No scleral icterus.  Cardiovascular:      Rate and Rhythm: Normal rate and regular rhythm.      Pulses: Normal pulses.      Heart sounds: Normal heart sounds. No murmur heard.  Pulmonary:      Effort: Pulmonary effort is normal. No respiratory distress.      Breath sounds: Normal breath sounds. No wheezing or rales.   Abdominal:      General: Bowel sounds are normal. There is no distension.      Palpations: Abdomen is soft. There is no mass.      Tenderness: There is no abdominal tenderness. There is no right CVA tenderness, left CVA tenderness or guarding.   Musculoskeletal:         General: No swelling, deformity or signs of injury. Normal range of motion.      Cervical back: Normal range of motion. No rigidity.       Comments: Right medial knee pain. TTP, warm   Skin:     General: Skin is warm and dry.      Capillary Refill: Capillary refill takes less than 2 seconds.      Coloration: Skin is not jaundiced.      Findings: Bruising present.   Neurological:      General: No focal deficit present.      Mental Status: He is alert and oriented to person, place, and time.      Motor: No weakness.   Psychiatric:         Mood and Affect: Mood normal.         Behavior: Behavior normal.         Thought Content: Thought content normal.         Consults:     * Severe sepsis  71 M admitted with urosepsis. Associated symptoms include generalized abdominal. HD stable. QSOFA: hypotension (sBP < 100). WBC 11. Lactate 1.8. UA consistent with UTI. CXR negative. CT A/P showed diverticulitis along with concern for ATN vs. Pyelonephritis.   Patient has a chronic PICC line placed for dobutamine infusion that was clean, dry, without signs of infection.     DDx: urosepsis, pyelonephritis, acute mesenteric ischemia, bacteremia, diverticulitis. Lower concern for pyelonephritis given no leukocytosis and constitutional symptoms prior to admission. Less concerned for acute mesenteric ischemia given no pain on PO intake along with decrease in generalized pain since presentation to ED.       Plan:  - Received levofloxacin 750 mg x 1 dose in ED.   - Ceftriaxone started on 3/29. Transitioned to cefpodoxime 4/1  - urine cx:E. Coli sensitive to ceftriaxone. Will DC with 4 more days of cefpodoxime  - Telemetry  - F/u BCx, UCx  - Strict I/Os  - Goal MAP >65       Skin breakdown  Recs per wound care      Dermatitis associated with moisture  Wound care      Pressure injury of heel, stage 2  Recs per wound care      E. coli UTI  Urine culture with E. Coli sensitive to erta, janel, ctx, cefazolin, bactrim.     - patient allergic to keflex  - on Ceftriaxone. Transitioned to cefpodoxime on 4/1      Clostridium difficile infection  Recent Abx use and diarrhea.     - PO  vancomycin 125 mg q6h started on 3/30 for 10 days of coverage after discontinuation of cephalosporin.  - Discharged with  mg q6h vancomycin capsules for 14 more days    LAVERNE (acute kidney injury)  Patient with acute kidney injury likely d/t Pre-renal azotemia Which is currently improving. Labs reviewed- Renal function/electrolytes with Estimated Creatinine Clearance: 32 mL/min (A) (based on SCr of 2.4 mg/dL (H)). according to latest data. Monitor urine output and serial BMP and adjust therapy as needed. Avoid nephrotoxins and renally dose meds for GFR listed above.     - Cr 2.8 on admission, improved to 2.4 after gentle IVF. Baseline Cr 1.6.   - F/u outpt with CMP one week from now    Essential hypertension  Hypotensive so far. Holding entresto.       Acute idiopathic gout of left knee  Continue home allopurinol 200mg    - Prednisone 40 mg started on 3/31. Will DC with 7 days of prednisone  - avoiding colchicine due to LAVERNE  - uric acid elevated 11.2  - Right knee pain      Elevated troponin I level  Pt with chronically elevated troponin   Troponin 0.082 on admission   EKG with no signs of acute ischemic change  No complaints of CP/discomfort     Plan  -STAT EKG, troponin if chest pain arises      Stage 3a chronic kidney disease  Baseline Cr 1.3.       Long term current use of amiodarone    On amiodarone 300 daily, has hx of vtach    Mixed hyperlipidemia  Lipitor 80 daily      Presence of cardiac resynchronization therapy defibrillator (CRT-D)    Cardiac device check    History of DVT (deep vein thrombosis)  DVT in 2015      Hx pulmonary embolism 2010 provoked dvt   In 2010      Cardiomyopathy, ischemic  Hx of cardiomyopathy. Has [employer] AICD.       Chronic combined systolic and diastolic congestive heart failure  Echo 9/21 showed EF 10-20%  Home meds: K 20mEq daily, furosemide 40mg qd, met succ 50mg, entresto 49-51mg   CXR negative for cardiogenic pulmonary edema       Plan:  - Daily weights (standing  if tolerated)  - Strict I/Os  - Fluid restriction at 1500mL  - Cardiac diet  - Daily CMP  - Verify with pharmacy correct dose of entresto. Patient unsure if increased to highest dose       Coronary artery disease involving native coronary artery of native heart without angina pectoris  Continue home ASA and statin       Final Active Diagnoses:    Diagnosis Date Noted POA    PRINCIPAL PROBLEM:  Severe sepsis [A41.9, R65.20] 03/29/2022 Yes    E. coli UTI [N39.0, B96.20] 03/31/2022 Yes    Pressure injury of heel, stage 2 [L89.602] 03/31/2022 Yes    Dermatitis associated with moisture [L30.8] 03/31/2022 Yes    Skin breakdown [R23.8] 03/31/2022 Yes    Clostridium difficile infection [A49.8] 03/30/2022 Yes    LAVERNE (acute kidney injury) [N17.9] 01/22/2021 Yes    Essential hypertension [I10] 01/20/2021 Yes     Chronic    Acute idiopathic gout of left knee [M10.062] 12/02/2019 Yes    Elevated troponin I level [R77.8] 06/15/2018 Yes    Stage 3a chronic kidney disease [N18.31] 06/02/2017 Yes    Long term current use of amiodarone [Z79.899] 05/05/2016 Not Applicable    Mixed hyperlipidemia [E78.2] 05/05/2016 Yes    Presence of cardiac resynchronization therapy defibrillator (CRT-D) [Z95.810] 12/13/2015 Yes    History of DVT (deep vein thrombosis) [Z86.718] 11/12/2015 Not Applicable     Chronic    Hx pulmonary embolism 2010 provoked dvt  [Z86.711] 03/27/2013 Yes     Chronic    Chronic combined systolic and diastolic congestive heart failure [I50.42] 11/26/2012 Yes    Coronary artery disease involving native coronary artery of native heart without angina pectoris [I25.10] 11/26/2012 Yes     Chronic    Cardiomyopathy, ischemic [I25.5] 11/26/2012 Yes     Chronic      Problems Resolved During this Admission:    Diagnosis Date Noted Date Resolved POA    Generalized abdominal pain [R10.84]  03/31/2022 Yes    Diverticulitis [K57.92] 03/29/2022 03/31/2022 Yes       Discharged Condition: stable    Disposition: Home or  Self Care    Follow Up:   Follow-up Information     Migue Henry Jr, MD. Schedule an appointment as soon as possible for a visit in 2 week(s).    Specialties: Transplant, Cardiology  Contact information:  [address]  [facility] [address]  [phone]             January Khan MD. Schedule an appointment as soon as possible for a visit in 2 week(s).    Specialty: Internal Medicine  Contact information:  [address]  [facility] [address]  [phone]                       Patient Instructions:      Comprehensive Metabolic Panel   Standing Status: Future Standing Exp. Date: 05/31/23     Ambulatory referral/consult to Cardiology   Standing Status: Future   Referral Priority: Routine Referral Type: Consultation   Referral Reason: Specialty Services Required   Requested Specialty: Cardiology   Number of Visits Requested: 1     Ambulatory referral/consult to Congestive Heart Failure Clinic   Standing Status: Future   Referral Priority: Routine Referral Type: Consultation   Referral Reason: Specialty Services Required   Requested Specialty: Cardiology   Number of Visits Requested: 1     Diet Cardiac     Notify your health care provider if you experience any of the following:  temperature >100.4     Notify your health care provider if you experience any of the following:  persistent nausea and vomiting or diarrhea     Notify your health care provider if you experience any of the following:  redness, tenderness, or signs of infection (pain, swelling, redness, odor or green/yellow discharge around incision site)     Notify your health care provider if you experience any of the following:  severe uncontrolled pain     Notify your health care provider if you experience any of the following:  difficulty breathing or increased cough     Activity as tolerated       Significant Diagnostic Studies: Labs:   CMP   Recent Labs   Lab 03/31/22  0317 03/31/22  1223 04/01/22  0250    134* 135*   K 4.3 4.6 5.0     100 101   CO2 21* 25 20*   GLU 80 95 109   BUN 38* 35* 42*   CREATININE 2.8* 2.5* 2.4*   CALCIUM 9.2 8.9 9.1   PROT 5.9*  --  6.3   ALBUMIN 2.6*  --  2.7*   BILITOT 0.2  --  0.2   ALKPHOS 86  --  88   AST 11  --  12   ALT 5*  --  6*   ANIONGAP 14 9 14   ESTGFRAFRICA 25.5* 29.2* 30.7*   EGFRNONAA 22.0* 25.3* 26.5*    and CBC   Recent Labs   Lab 03/31/22  0317 04/01/22  0243   WBC 9.07 8.83   HGB 11.3* 11.3*   HCT 36.1* 36.6*    339     Microbiology:   Blood Culture   Lab Results   Component Value Date    LABBLOO No Growth to date 03/28/2022    LABBLOO No Growth to date 03/28/2022    LABBLOO No Growth to date 03/28/2022    LABBLOO No Growth to date 03/28/2022    LABBLOO No Growth to date 03/28/2022    LABBLOO No Growth to date 03/28/2022    LABBLOO No Growth to date 03/28/2022    LABBLOO No Growth to date 03/28/2022    and Urine Culture    Lab Results   Component Value Date    LABURIN ESCHERICHIA COLI  >100,000 cfu/ml   (A) 03/28/2022       Pending Diagnostic Studies:     None         Medications:  Reconciled Home Medications:      Medication List      START taking these medications    cefpodoxime 200 MG tablet  Commonly known as: VANTIN  Take 1 tablet (200 mg total) by mouth every 12 (twelve) hours. for 4 days     predniSONE 20 MG tablet  Commonly known as: DELTASONE  Take 2 tablets (40 mg total) by mouth once daily. for 5 days  Start taking on: April 2, 2022     vancomycin 125 MG capsule  Commonly known as: VANCOCIN  Take 1 capsule (125 mg total) by mouth 4 (four) times daily. for 14 days        CHANGE how you take these medications    DOBUTamine 500 mg/250 mL (2,000 mcg/mL)  Commonly known as: DOBUTREX  2.5 mcg/kg/min  Patient is 95.7 kg  What changed:   · how much to take  · how to take this  · additional instructions     ENTRESTO 24-26 mg per tablet  Generic drug: sacubitriL-valsartan  Take 1 tablet by mouth 2 (two) times daily. HOLD UNTIL RESUMED BY CARDIOLOGIST  What changed:   · medication  strength  · when to take this  · additional instructions     furosemide 40 MG tablet  Commonly known as: LASIX  Take 1 tablet (40 mg total) by mouth once daily. HOLD UNTIL TOLD TO RESUME BY PHYSICIAN  What changed: additional instructions     metoprolol succinate 25 MG 24 hr tablet  Commonly known as: TOPROL-XL  Take 1 tablet (25 mg total) by mouth once daily.  What changed:   · medication strength  · how much to take     midodrine 2.5 MG Tab  Commonly known as: PROAMATINE  Take 1 tablet (2.5 mg total) by mouth 3 (three) times daily. HOLD until follow up with cardiology  What changed: additional instructions        CONTINUE taking these medications    allopurinoL 100 MG tablet  Commonly known as: ZYLOPRIM  Take 2 tablets (200 mg total) by mouth once daily.     amiodarone 200 MG Tab  Commonly known as: PACERONE  TAKE 1 AND 1/2 TABLETS(300 MG) BY MOUTH EVERY DAY     aspirin 81 MG EC tablet  Commonly known as: ECOTRIN  Take 1 tablet (81 mg total) by mouth once daily.     atorvastatin 80 MG tablet  Commonly known as: LIPITOR  Take 1 tablet (80 mg total) by mouth once daily.     BenadryL 25 mg capsule  Generic drug: diphenhydrAMINE  Take 25 mg by mouth as needed for Allergies.     ondansetron 4 MG Tbdl  Commonly known as: ZOFRAN-ODT  Take 1 tablet (4 mg total) by mouth every 6 (six) hours as needed (nausea).     oxyCODONE-acetaminophen 5-325 mg per tablet  Commonly known as: PERCOCET  Take 1 tablet by mouth every 6 (six) hours as needed for Pain.     paricalcitoL 1 MCG capsule  Commonly known as: ZEMPLAR  Take 1 capsule (1 mcg total) by mouth once daily.     simethicone 80 MG chewable tablet  Commonly known as: MYLICON  Take 1 tablet (80 mg total) by mouth every 6 (six) hours as needed for Flatulence.        STOP taking these medications    clopidogreL 75 mg tablet  Commonly known as: PLAVIX     colchicine 0.6 mg tablet  Commonly known as: COLCRYS     docusate sodium 100 MG capsule  Commonly known as: COLACE      ipratropium 0.02 % nebulizer solution  Commonly known as: ATROVENT     mineral oil-hydrophil petrolat Oint  Commonly known as: AQUAPHOR     pantoprazole 40 MG tablet  Commonly known as: PROTONIX     polyethylene glycol 17 gram Pwpk  Commonly known as: GLYCOLAX     potassium chloride SA 20 MEQ tablet  Commonly known as: K-DURKLOR-CON     SENNA 8.6 mg Cap  Generic drug: sennosides     wound dressings Pste            Indwelling Lines/Drains at time of discharge:   Lines/Drains/Airways     Peripherally Inserted Central Catheter Line  Duration           PICC Double Lumen right basilic -- days                Time spent on the discharge of patient: 35 minutes         Uma Dupree MD  Department of Hospital Medicine  [facility] - Intensive Care ([place]

## 2022-04-01 NOTE — PLAN OF CARE
04/01/22 1046   Post-Acute Status   Post-Acute Authorization Home Health;IV Infusion   Home Health Status Referrals Sent   Coverage [facility]   IV Infusion Status Referral(s) sent   Hospital Resources/Appts/Education Provided Appointments scheduled and added to AVS   Discharge Delays None known at this time   Discharge Plan   Discharge Plan A Home Health;Home with family   Discharge Plan B Home Health;Home with family     TALIA attempted to get patient to [facility] to no avail. Forwarded HH/Home Infusion referral to [facility] via [facility] fax. Asked that they send referral to [facility] for IV infusion. Made patient a PCP appt    SW attempted to make patient a Cardiology appt with Dr. Henry, however, the  reported that the referral was for patient to follow up in Cardiology, not Heart Failure clinic (where Dr. Henry practices) The  reported that the nurse will contact the patient to schedule appt.     TALIA attempted a second time to schedule an appt at the heart failure clinic. However, the  stated that they have their own schedulers and that they need to contact the patient to schedule appt.     Adwoa Wells, [facility]  [phone]

## 2022-04-01 NOTE — PLAN OF CARE
[facility] ([place] [id number])      HOME HEALTH ORDERS  FACE TO FACE ENCOUNTER    Patient Name: Audrey Oneil Jr.  YOB: 1950    PCP: January Khan MD   PCP Address: [address] / [place] [address]  PCP Phone Number: [phone]  PCP Fax: [phone]    Encounter Date: 3/28/22    Admit to Home Health    Diagnoses:  Active Hospital Problems    Diagnosis  POA    *Severe sepsis [A41.9, R65.20]  Yes    E. coli UTI [N39.0, B96.20]  Yes    Pressure injury of heel, stage 2 [L89.602]  Yes    Dermatitis associated with moisture [L30.8]  Yes    Skin breakdown [R23.8]  Yes    Clostridium difficile infection [A49.8]  Yes    Essential hypertension [I10]  Yes     Chronic    Acute idiopathic gout of left knee [M10.062]  Yes    Elevated troponin I level [R77.8]  Yes    Stage 3a chronic kidney disease [N18.31]  Yes    Long term current use of amiodarone [Z79.899]  Not Applicable    Mixed hyperlipidemia [E78.2]  Yes    Presence of cardiac resynchronization therapy defibrillator (CRT-D) [Z95.810]  Yes           History of DVT (deep vein thrombosis) [Z86.718]  Not Applicable     Chronic    Hx pulmonary embolism 2010 provoked dvt  [Z86.711]  Yes     Chronic    Chronic combined systolic and diastolic congestive heart failure [I50.42]  Yes     11/30/15 Echo:  1 - Eccentric LVH with severely depressed left ventricular systolic function (EF 15-20%).   2 - Mild left atrial enlargement.   3 - Left ventricular diastolic dysfunction.   4 - Normal right ventricular systolic function .       Coronary artery disease involving native coronary artery of native heart without angina pectoris [I25.10]  Yes     Chronic     Cath 10- Stents patent non-obstructive disease  Cath 11-12015 non-obstructive disease      Cardiomyopathy, ischemic [I25.5]  Yes     Chronic      Resolved Hospital Problems    Diagnosis Date Resolved POA    Generalized abdominal pain [R10.84] 03/31/2022 Yes     Diverticulitis [K57.92] 03/31/2022 Yes       Follow Up Appointments:  Future Appointments   Date Time Provider Department Center   4/8/2022 10:00 AM Cornell Franco MD [facility]   5/9/2022 10:00 AM HOME MONITOR DEVICE CHECK, [facility]       Allergies:  Review of patient's allergies indicates:   Allergen Reactions    Iodine containing multivitamin Swelling     itching    Keflex [cephalexin] Swelling     Eyes.  Tolerated multiple doses of zosyn and 1 dose of augmentin in 2015 and 2016, respectively    Peaches [peach (prunus persica)] Swelling     eyes    Shellfish containing products Swelling    Fig tree Swelling     itching    Tuberculin spenser test ppd Rash       Medications: Review discharge medications with patient and family and provide education.    Current Facility-Administered Medications   Medication Dose Route Frequency Provider Last Rate Last Admin    acetaminophen tablet 650 mg  650 mg Oral Q6H PRN Maverick Gaxiola MD   650 mg at 03/30/22 0620    albuterol-ipratropium 2.5 mg-0.5 mg/3 mL nebulizer solution 3 mL  3 mL Nebulization Q4H PRN John Arteaga MD        allopurinoL tablet 200 mg  200 mg Oral Daily John Arteaga MD   200 mg at 04/01/22 0851    alteplase injection 2 mg  2 mg Intra-Catheter Once Maverick Gaxiola MD        amiodarone tablet 300 mg  300 mg Oral Daily John Arteaga MD   300 mg at 04/01/22 0850    aspirin EC tablet 81 mg  81 mg Oral Daily John Arteaga MD   81 mg at 04/01/22 0850    atorvastatin tablet 80 mg  80 mg Oral Daily John Arteaga MD   80 mg at 04/01/22 0850    cefpodoxime tablet 200 mg  200 mg Oral Q12H Uma Dupree MD   200 mg at 04/01/22 0851    dextrose 10% bolus 125 mL  12.5 g Intravenous PRN Peyman Burciaga III, MD        dextrose 10% bolus 250 mL  25 g Intravenous PRN Peyman Burciaga III, MD        diphenhydrAMINE capsule 25 mg  25 mg Oral Q6H PRN Roberta Aranda, DO   25 mg at 03/29/22 1831    DOBUtamine 1000 mg in D5W 250 mL infusion   2.5 mcg/kg/min Intravenous Continuous John Arteaga MD 3.6 mL/hr at 04/01/22 0440 2.5 mcg/kg/min at 04/01/22 0440    glucagon (human recombinant) injection 1 mg  1 mg Intramuscular PRN John Arteaga MD        glucose chewable tablet 16 g  16 g Oral PRN John Arteaga MD        glucose chewable tablet 24 g  24 g Oral PRN John Arteaga MD        heparin (porcine) injection 5,000 Units  5,000 Units Subcutaneous Q8H John Arteaga MD   5,000 Units at 04/01/22 0525    melatonin tablet 6 mg  6 mg Oral Nightly PRN John Arteaga MD   6 mg at 03/29/22 2142    metoprolol succinate (TOPROL-XL) 24 hr tablet 25 mg  25 mg Oral Daily Uma Dupree MD   25 mg at 04/01/22 0851    miconazole NITRATE 2 % top powder   Topical (Top) BID Roberta Aranda DO   Given at 04/01/22 0852    miconazole nitrate 2% ointment   Topical (Top) BID Nicole Alonso MD   Given at 04/01/22 0852    mupirocin 2 % ointment   Nasal BID Peyman Burciaga III, MD   Given at 04/01/22 0852    naloxone 0.4 mg/mL injection 0.02 mg  0.02 mg Intravenous PRN John Arteaga MD        oxyCODONE-acetaminophen 5-325 mg per tablet 1 tablet  1 tablet Oral Q6H PRN John Arteaga MD   1 tablet at 03/31/22 2131    predniSONE tablet 40 mg  40 mg Oral Daily Uma Dupree MD   40 mg at 04/01/22 0851    prochlorperazine injection Soln 2.5 mg  2.5 mg Intravenous Q6H PRN Maverick Gaxiola MD   2.5 mg at 03/29/22 2130    simethicone chewable tablet 80 mg  1 tablet Oral QID PRN John Arteaga MD        sodium chloride 0.9% flush 10 mL  10 mL Intravenous Q12H PRN John Arteaga MD        vancomycin 125 mg/5 mL oral solution 125 mg  125 mg Oral Q6H Uma Dupree MD   125 mg at 04/01/22 1208     Current Discharge Medication List      START taking these medications    Details   cefpodoxime (VANTIN) 200 MG tablet Take 1 tablet (200 mg total) by mouth every 12 (twelve) hours. for 4 days  Qty: 8 tablet, Refills: 0      predniSONE (DELTASONE) 20 MG tablet Take 2 tablets (40 mg total) by  mouth once daily. for 5 days  Qty: 10 tablet, Refills: 0      vancomycin (VANCOCIN) 125 MG capsule Take 1 capsule (125 mg total) by mouth 4 (four) times daily. for 14 days  Qty: 56 capsule, Refills: 0         CONTINUE these medications which have CHANGED    Details   amiodarone (PACERONE) 200 MG Tab TAKE 1 AND 1/2 TABLETS(300 MG) BY MOUTH EVERY DAY  Qty: 135 tablet, Refills: 3      atorvastatin (LIPITOR) 80 MG tablet Take 1 tablet (80 mg total) by mouth once daily.  Qty: 90 tablet, Refills: 1    Associated Diagnoses: Mixed hyperlipidemia      DOBUTamine (DOBUTREX) 500 mg/250 mL (2,000 mcg/mL) 2.5 mcg/kg/min  Patient is 95.7 kg  Qty: 250 mL, Refills: 5      furosemide (LASIX) 40 MG tablet Take 1 tablet (40 mg total) by mouth once daily. HOLD UNTIL TOLD TO RESUME BY PHYSICIAN  Qty: 30 tablet, Refills: 0      metoprolol succinate (TOPROL-XL) 25 MG 24 hr tablet Take 1 tablet (25 mg total) by mouth once daily.  Qty: 90 tablet, Refills: 3    Comments: .      midodrine (PROAMATINE) 2.5 MG Tab Take 1 tablet (2.5 mg total) by mouth 3 (three) times daily. HOLD until follow up with cardiology  Qty: 90 tablet, Refills: 0      sacubitriL-valsartan (ENTRESTO) 24-26 mg per tablet Take 1 tablet by mouth 2 (two) times daily. HOLD UNTIL RESUMED BY CARDIOLOGIST         CONTINUE these medications which have NOT CHANGED    Details   aspirin (ECOTRIN) 81 MG EC tablet Take 1 tablet (81 mg total) by mouth once daily.  Qty: 30 tablet, Refills: 0      diphenhydrAMINE (BENADRYL) 25 mg capsule Take 25 mg by mouth as needed for Allergies.      ondansetron (ZOFRAN-ODT) 4 MG TbDL Take 1 tablet (4 mg total) by mouth every 6 (six) hours as needed (nausea).  Qty: 28 tablet, Refills: 0      oxyCODONE-acetaminophen (PERCOCET) 5-325 mg per tablet Take 1 tablet by mouth every 6 (six) hours as needed for Pain.  Qty: 28 tablet, Refills: 0    Comments: Quantity prescribed more than 7 day supply? No      simethicone (MYLICON) 80 MG chewable tablet Take 1  tablet (80 mg total) by mouth every 6 (six) hours as needed for Flatulence.  Qty: 21 tablet, Refills: 0      allopurinoL (ZYLOPRIM) 100 MG tablet Take 2 tablets (200 mg total) by mouth once daily.  Qty: 30 tablet, Refills: 0    Associated Diagnoses: Chronic gout without tophus, unspecified cause, unspecified site      paricalcitoL (ZEMPLAR) 1 MCG capsule Take 1 capsule (1 mcg total) by mouth once daily.  Qty: 30 capsule, Refills: 0         STOP taking these medications       docusate sodium (COLACE) 100 MG capsule Comments:   Reason for Stopping:         mineral oil-hydrophil petrolat (AQUAPHOR) Oint Comments:   Reason for Stopping:         polyethylene glycol (GLYCOLAX) 17 gram PwPk Comments:   Reason for Stopping:         sennosides (SENNA) 8.6 mg Cap Comments:   Reason for Stopping:         clopidogreL (PLAVIX) 75 mg tablet Comments:   Reason for Stopping:         colchicine (COLCRYS) 0.6 mg tablet Comments:   Reason for Stopping:         ipratropium (ATROVENT) 0.02 % nebulizer solution Comments:   Reason for Stopping:         pantoprazole (PROTONIX) 40 MG tablet Comments:   Reason for Stopping:         potassium chloride SA (K-DUR,KLOR-CON) 20 MEQ tablet Comments:   Reason for Stopping:         wound dressings Pste Comments:   Reason for Stopping:                 I have seen and examined this patient within the last 30 days. My clinical findings that support the need for the home health skilled services and home bound status are the following:no   Weakness/numbness causing balance and gait disturbance due to Heart Failure, Infection and Weakness/Debility making it taxing to leave home.     Diet:   cardiac diet, fluid restriction and 2 gram sodium diet    Labs:  SN to perform labs:  CBC: Weekly; 3 week(s) and CMP: Weekly; 3 week(s)    Referrals/ Consults  Physical Therapy to evaluate and treat. Evaluate for home safety and equipment needs; Establish/upgrade home exercise program. Perform / instruct on therapeutic  exercises, gait training, transfer training, and Range of Motion.  Occupational Therapy to evaluate and treat. Evaluate home environment for safety and equipment needs. Perform/Instruct on transfers, ADL training, ROM, and therapeutic exercises.    Activities:   activity as tolerated    Nursing:   Agency to admit patient within 24 hours of hospital discharge unless specified on physician order or at patient request    SN to complete comprehensive assessment including routine vital signs. Instruct on disease process and s/s of complications to report to MD. Review/verify medication list sent home with the patient at time of discharge  and instruct patient/caregiver as needed. Frequency may be adjusted depending on start of care date.     Skilled nurse to perform up to 3 visits PRN for symptoms related to diagnosis    Notify MD if SBP > 160 or < 90; DBP > 90 or < 50; HR > 120 or < 50; Temp > 101; O2 < 88%; Other:       Ok to schedule additional visits based on staff availability and patient request on consecutive days within the home health episode.    When multiple disciplines ordered:    Start of Care occurs on Sunday - Wednesday schedule remaining discipline evaluations as ordered on separate consecutive days following the start of care.    Thursday SOC -schedule subsequent evaluations Friday and Monday the following week.     Friday - Saturday SOC - schedule subsequent discipline evaluations on consecutive days starting Monday of the following week.    For all post-discharge communication and subsequent orders please contact patient's primary care physician. If unable to reach primary care physician or do not receive response within 30 minutes, please contact  for clinical staff order clarification    Miscellaneous   Heart Failure:      SN to instruct on the following:    Instruct on the definition of CHF.   Instruct on the signs/sympoms of CHF to be reported.   Instruct on and monitor daily weights.   Instruct on  factors that cause exacerbation.   Instruct on action, dose, schedule, and side effects of medications.   Instruct on diet as prescribed.   Instruct on activity allowed.   Instruct on life-style modifications for life long management of CHF   SN to assess compliance with daily weights, diet, medications, fluid retention,    safety precautions, activities permitted and life-style modifications.   Additional 1-2 SN visits per week as needed for signs and symptoms     of CHF exacerbation.      For Weight Gain > 2-3 lbs in 1 day or 4-6 lbs over 1 week notify PCP:  Increase lasix 40 (oral diuretic) dose to Lasix 80 for 5 days temporarily    Home Health Aide:  Nursing Three times weekly, Physical Therapy Three times weekly and Occupational Therapy Three times weekly    Wound Care Orders  yes:  Pressure Ulcer(s) Stage I :   Location: greater trochaneter  Apply Miconzazole:  2% cream                       Frequency:  Twice Daily                             If incontinent of stool or urine, apply thin layer Barrier cream                   twice daily and PRN to wound         Pressure relief measure: foam dressing, change 2x/week     Skin breakdown  - right greater trochanter and post thigh injuries with partial thickness tissue loss.  - likely skin tears.  - right trochanter wound appears to be healing.   - cover with foam dressing and change 2x/weekly. Tues/Fri.  - nursing to maintain pressure injury prevention measures.      Dermatitis associated with moisture  - buttocks/groin/scrotum with redness/irritation likely due to moisture/fungal infection.  - continue miconazole cream bid/prn cleansing.     Pressure injury of heel, stage 2  - pt evaluated for multiple areas of skin breakdown.  - stage 2 pressure injury noted to right heel.  - foam dressing applied. Change 2x/weekly on Tues/Fri.     I certify that this patient is confined to his home and needs intermittent skilled nursing care, physical therapy and occupational  therapy.

## 2022-04-01 NOTE — PROGRESS NOTES
SBAR hand off given to Matilde TALAMANTES. Pt is awake and alert in bed. Family at bedside. O2 on and in place. NAD noted. Care transferred.

## 2022-04-01 NOTE — ASSESSMENT & PLAN NOTE
Patient with acute kidney injury likely d/t Pre-renal azotemia Which is currently improving. Labs reviewed- Renal function/electrolytes with Estimated Creatinine Clearance: 32 mL/min (A) (based on SCr of 2.4 mg/dL (H)). according to latest data. Monitor urine output and serial BMP and adjust therapy as needed. Avoid nephrotoxins and renally dose meds for GFR listed above.     - Cr 2.8 on admission, improved to 2.4 after gentle IVF. Baseline Cr 1.6.   - F/u outpt with CMP one week from now

## 2022-04-01 NOTE — MEDICAL/APP STUDENT
Progress Note  Hospital Medicine                                                              Team: [facility] MED 1    Patient Name: Audrey Oneil Jr.  YOB: 1950    Admit Date: 3/28/2022                     LOS: 3    SUBJECTIVE:     Reason for Admission:  Severe sepsis    HPI: Mr. Oneil is a 71 M with PMHx of CAD, HFrEF (10-20%) on chronic dobutamine gtt, s/p AICD, h/o PE, HTN, HLD who presents to [facility] ED with acute generalized abdominal pain that started this afternoon after leaving therapy. Describes pain as dull, non radiating, generalized, 9/10 in severity. Complains of associated nausea but no episodes of emesis. Reports not having a bowel movement since Saturday, but previously having diarrhea with intermittent occurences of benoit sized stool beforehand. Denies prior episode of similar abdominal pain. No history of abdominal surgeries. Of note, Patient had a cardiac arrest in January, 2022.     Hospital Course (3/30): He is in stable condition with support from continuous dobutamine infusion. He experienced hypotensive episode last evening (89/53 mmHg) leading to midodrine administration to reach goal MAP. Patient also increased oxygen requirements due to episode of SOB (O2 sat 87-89%). 2L NC applied during this episode. This morning he was no longer on oxygen and O2 saturation was normal. He complains of generalized tenderness across the abdomen with the worst pain being in the suprapubic/infraumbilical region. He also complained of lower to mid back pain radiating from the suprapubic area at night time that has now resolved when I saw him. This sounds  tract/kidney related and/or complications of the diverticulitis. He rates his pain as 7/10 despite pain management. He had a loose bowel movement during the early morning. Denies n/v. He has not been eating well attributing to lack of appetite at this time. He continues to mention some minor wheezing, but has not worsened since yesterday.       Urine culture shows growth of gram negative rods and >512415 cfu. Identification and susceptibility still pending on UCx. On ceftriaxone for coverage, levofloxacin started in ER was discontinued. Metronidazole continued due to CT/AP concerning for diverticulitis.      Hopsital course(3/31): He is in stable condition with support from continuous dobutamine infusion. He experienced hypotensive episodes last evening (lowest 77/50 mmHg). Patient also increased oxygen requirements currently on 1.5 L NC continuously (o2 sat 92-99% on NC). He states improving generalized tenderness across the abdomen with the worst pain being in the hypogastric region which could be from  tract infection or some attribution to the possible rectus sheath hematoma noted on CT A/P on 03/29. He complains of right flank pain now. He rates his pain as 6/10 improved with pain medications. He states his stool consistency is becoming more solid, but still loser than normal. Denies n/v. He has been eating better. Patient complains of gout flare up in R knee which is making mobility more difficult. States he has been on colchicine and allopurinol in prior admissions. Denies any pain at PICC line site. Nursing overnight had flushed the unused port and found no withdrawal of blood.      Urine culture shows growth of E. coli and >412068 cfu. Identification and susceptibility on UCx indicate sensitivy to ceftriaxone. C Difficile PCR and antigen positive, toxin negative. Began on oral vancomycin yesterday 125 mg q6h. Metronidazole discontinued as oral vancomycin is preferred for C. Difficile colitis.        Interval history: He is in stable condition with inotropic therapy support. Generally, he appeared well and sitting up at the bed when I saw him. Last night he experienced no hypotensive episodes (lowest 105/55 mmHg) which is an improvement from yesterday, and he also had hypertension up to 162/92. His home hypertension medications are still  held. He is on 2L O2 NC for O2 sat ranging from % He states he feels better and that whatever we are giving him is working for the abdominal pain. He states that his abdominal pain is no longer there and 0/10 pain over his abdomen. He still complains of R knee tenderness and swelling from his gout flare up. This has improvement somewhat from yesterday. Stools more consistent and less loose. Eating better and increasing oral intake of fluid, but still with in fluid restriction <1500. R sided  weakness has mostly resolved.     UCx showed growth of E. Coli for which he is covered by cefrodime PO. He is also continuing vancomycin PO for his C. diff colitis.    OBJECTIVE:     Vital Signs Range (Last 24H):  Temp:  [97.4 °F (36.3 °C)-98.2 °F (36.8 °C)]   Pulse:  [71-95]   Resp:  [18-22]   BP: (105-162)/(53-92)   SpO2:  [91 %-100 %] Body mass index is 33.05 kg/m².  Wt Readings from Last 3 Encounters:   03/28/22 1631 95.7 kg (211 lb)   12/12/21 1959 109.8 kg (242 lb)   12/12/21 0021 109.8 kg (242 lb)   10/27/21 1503 108 kg (238 lb)       I & O (Last 24H):    Intake/Output Summary (Last 24 hours) at 4/1/2022 1148  Last data filed at 4/1/2022 0413  Gross per 24 hour   Intake 982.09 ml   Output 575 ml   Net 407.09 ml       Physical Exam:  General: appears stated age, no distress, moderately obese  HENT: Head:normocephalic, atraumatic. Ears:hearing grossly normal bilaterally. Nose: Nares normal. Septum midline. Mucosa normal. No drainage or sinus tenderness.. Throat: no throat erythema, normal findings: lips normal without lesions, gums healthy, tongue midline and normal, soft palate, uvula, and tonsils normal and oropharynx pink & moist without lesions or evidence of thrush and abnormal findings: dentition: poor.  Eyes: conjunctivae/corneas clear. PERRL.   Neck: no JVD and supple, symmetrical, trachea midline, no JVD  Lungs:  clear to auscultation bilaterally and normal respiratory effort  Cardiovascular: Heart:  regular rate and rhythm, S1, S2 normal and systolic murmur: early systolic 2/6, blowing at 2nd left intercostal space. Chest Wall: no tenderness. Extremities: no edema in limbs, some redness in distal legs/feet. Pulses: 2+ and symmetric.  Abdomen/Rectal: Abdomen: normal findings: no bruits heard, no masses palpable, no organomegaly, no renal abnormalities palpable and soft, non-tender and abnormal findings:  distended and obese. Rectal: not examined  Skin: nails clear without discoloration or sign of infection, nails normal without clubbing, no edema, no periungual infections, temperature normal and texture normal or ecchymoses - arm(s) bilateral, abdomen and erythema - lower leg(s) bilateral  Musculoskeletal:R knee tenderness and swelling, no ertyhma, warm to touch  Lymph Nodes: No cervical or supraclavicular adenopathy  Neurologic: Mental status: Alert, oriented, thought content appropriate  Cranial nerves: II: pupils equal, round, reactive to light and accommodation, III,IV,VI: extraocular muscles extra-ocular motions intact, IX: soft palate elevation normal bilaterally  Sensory: grossly normal  Motor:grossly normal  Psych/Behavioral:  Alert and oriented, appropriate affect.    Diagnostic Results:  CBC:  Recent Labs   Lab 04/01/22  0243   WBC 8.83   HGB 11.3*   HCT 36.6*        CMP:  Recent Labs   Lab 04/01/22  0250   CALCIUM 9.1   ALBUMIN 2.7*   PROT 6.3   *   K 5.0   CO2 20*      BUN 42*   CREATININE 2.4*   ALKPHOS 88   ALT 6*   AST 12   BILITOT 0.2     BMP:   Recent Labs   Lab 04/01/22  0250      *   K 5.0      CO2 20*   BUN 42*   CREATININE 2.4*   CALCIUM 9.1   MG 1.9       Microbiology Results (last 7 days)     Procedure Component Value Units Date/Time    Blood culture x two cultures. Draw prior to antibiotics. [[id number]] Collected: 03/28/22 6735    Order Status: Completed Specimen: Blood from Peripheral, Hand, Left Updated: 03/31/22 2012     Blood Culture, Routine No  Growth to date      No Growth to date      No Growth to date      No Growth to date    Narrative:      Aerobic and anaerobic    Blood culture x two cultures. Draw prior to antibiotics. [[id number]] Collected: 03/28/22 1659    Order Status: Completed Specimen: Blood from Peripheral, Hand, Left Updated: 03/31/22 2012     Blood Culture, Routine No Growth to date      No Growth to date      No Growth to date      No Growth to date    Narrative:      Aerobic and anaerobic    Urine culture [[id number]]  (Abnormal)  (Susceptibility) Collected: 03/28/22 1658    Order Status: Completed Specimen: Urine Updated: 03/31/22 0119     Urine Culture, Routine ESCHERICHIA COLI  >100,000 cfu/ml      Narrative:      Specimen Source->Urine    C Diff Toxin by PCR [[id number]]  (Abnormal) Collected: 03/29/22 2259    Order Status: Completed Updated: 03/30/22 1533     C. diff PCR Positive    Clostridium difficile EIA [[id number]]  (Abnormal) Collected: 03/29/22 2259    Order Status: Completed Specimen: Stool Updated: 03/30/22 1420     C. diff Antigen Positive     C difficile Toxins A+B, EIA Negative     Comment: Testing not recommended for children <24 months old.       Clostridium difficile EIA [[id number]]     Order Status: Canceled Specimen: Stool         Labs within the past 24 hours have been reviewed.        ASSESSMENT/PLAN:     Current Problems List:  Active Hospital Problems    Diagnosis  POA    *Severe sepsis [A41.9, R65.20]  Yes    E. coli UTI [N39.0, B96.20]  Yes    Pressure injury of heel, stage 2 [L89.602]  Yes    Dermatitis associated with moisture [L30.8]  Yes    Skin breakdown [R23.8]  Yes    Clostridium difficile infection [A49.8]  Yes    Essential hypertension [I10]  Yes     Chronic    Acute idiopathic gout of left knee [M10.062]  Yes    Elevated troponin I level [R77.8]  Yes    Stage 3a chronic kidney disease [N18.31]  Yes    Long term current use of amiodarone [Z79.899]  Not Applicable    Mixed hyperlipidemia  [E78.2]  Yes    Presence of cardiac resynchronization therapy defibrillator (CRT-D) [Z95.810]  Yes           History of DVT (deep vein thrombosis) [Z86.718]  Not Applicable     Chronic    Hx pulmonary embolism 2010 provoked dvt  [Z86.711]  Yes     Chronic    Chronic combined systolic and diastolic congestive heart failure [I50.42]  Yes     11/30/15 Echo:  1 - Eccentric LVH with severely depressed left ventricular systolic function (EF 15-20%).   2 - Mild left atrial enlargement.   3 - Left ventricular diastolic dysfunction.   4 - Normal right ventricular systolic function .       Coronary artery disease involving native coronary artery of native heart without angina pectoris [I25.10]  Yes     Chronic     Cath 10- Stents patent non-obstructive disease  Cath 11-12015 non-obstructive disease      Cardiomyopathy, ischemic [I25.5]  Yes     Chronic      Resolved Hospital Problems    Diagnosis Date Resolved POA    Generalized abdominal pain [R10.84] 03/31/2022 Yes    Diverticulitis [K57.92] 03/31/2022 Yes       Active Problems, Status & Treatment Plan Addressed Today:    1. Sepsis    This patient does have evidence of infective focus  My overall impression is resolving sepsis. Vital signs were reviewed and noted in progress note.  Antibiotics given- PO cefpodoxime 200 mg q12h, PO vancomycin 125mg q6h    Antibiotics (From admission, onward)            Start     Stop Route Frequency Ordered    04/01/22 0900  cefpodoxime tablet 200 mg         04/05 0859 Oral Every 12 hours 04/01/22 0743    03/31/22 0000  vancomycin 125 mg/5 mL oral solution 125 mg  (C. difficile Infection (CDI) Treatment Order Panel)         04/14 2359 Oral Every 6 hours 03/30/22 1532    03/29/22 0900  mupirocin 2 % ointment         04/03 0859 Nasl 2 times daily 03/29/22 0139        Cultures were taken-   Microbiology Results (last 7 days)     Procedure Component Value Units Date/Time    Blood culture x two cultures. Draw prior to  antibiotics. [[id number]] Collected: 03/28/22 1659    Order Status: Completed Specimen: Blood from Peripheral, Hand, Left Updated: 03/31/22 2012     Blood Culture, Routine No Growth to date      No Growth to date      No Growth to date      No Growth to date    Narrative:      Aerobic and anaerobic    Blood culture x two cultures. Draw prior to antibiotics. [[id number]] Collected: 03/28/22 1659    Order Status: Completed Specimen: Blood from Peripheral, Hand, Left Updated: 03/31/22 2012     Blood Culture, Routine No Growth to date      No Growth to date      No Growth to date      No Growth to date    Narrative:      Aerobic and anaerobic    Urine culture [[id number]]  (Abnormal)  (Susceptibility) Collected: 03/28/22 1658    Order Status: Completed Specimen: Urine Updated: 03/31/22 0119     Urine Culture, Routine ESCHERICHIA COLI  >100,000 cfu/ml      Narrative:      Specimen Source->Urine    C Diff Toxin by PCR [[id number]]  (Abnormal) Collected: 03/29/22 2259    Order Status: Completed Updated: 03/30/22 1533     C. diff PCR Positive    Clostridium difficile EIA [[id number]]  (Abnormal) Collected: 03/29/22 2259    Order Status: Completed Specimen: Stool Updated: 03/30/22 1420     C. diff Antigen Positive     C difficile Toxins A+B, EIA Negative     Comment: Testing not recommended for children <24 months old.       Clostridium difficile EIA [[id number]]     Order Status: Canceled Specimen: Stool         Latest lactate reviewed, they are-  No results for input(s): LACTATE in the last 72 hours.    Organ dysfunction indicated by Acute kidney injury and this seems more likely due to hypoperfusion exacerbated by the HFrEF  Source- diverticulitis v  tract infection       Plan:     -d/c home with home health - cefpodoxime 200mg PO q12h and vancomycin 125mg PO q6h   -f/u BCx for any growth   -strict I/Os  -monitoring with telemetry at home  -Goal MAP is >65   - source: C. Diff colitis v pyelonephritis v  tract infection ;  BCx still negative         2. C. Diff colitis     CT A/P showed diverticula in descending and sigmoid colon with wall thickening. Confirmed C. diff infection. Correlates with colitis on CT A/P     Plan:     -oral vancomycin 125mg q6h  -serial abdominal examination  -abdominal pain resolved, f/u outpatient with primary care     3.Essential Hypertension     Plan:      -Continue dobutamine gtt and home meds. Hold entresto  -see cardiology outpatient     4. Gout     Acute R knee flare.      Plan:     Continue home allopurinol dose and prescribe prednisone for outpatient treatment     5. Chronic combined systolic and diastolic congestive heart failure     Recent echo 9/21 showed EF 15%    Plan:    Continue home meds: K 20mEq, met succ 50 mg, amiodarone 300 mg  Hold home med: entresto (hypotensive overnight)  Discontinue midronine   See outpatient cardiology     6. LAVERNE Stage 1     Plan:         -daily weights  -strict I/Os   -fluid restriction 1500mL, encourage oral fluid intake to this point  -cardiac diet  -K 20mEq daily  -Renal panel and CMP with primary care  -entresto dose, held for now    6. Coronary artery disease involving native coronary artery of native heart without angina pectoris     Continue home ASA and statin    VTE Risk Mitigation (From admission, onward)         Ordered     heparin (porcine) injection 5,000 Units  Every 8 hours         03/29/22 0136     IP VTE HIGH RISK PATIENT  Once         03/29/22 0136     Place sequential compression device  Until discontinued         03/29/22 0136                  Signing:  Tre Lynn

## 2022-04-01 NOTE — ASSESSMENT & PLAN NOTE
Recent Abx use and diarrhea.     - PO vancomycin 125 mg q6h started on 3/30 for 10 days of coverage after discontinuation of cephalosporin.  - Discharged with  mg q6h vancomycin capsules for 14 more days

## 2022-04-01 NOTE — PROGRESS NOTES
Discharge instructions given to pt and pt's sister. Understanding verbalized. Pt left with PICC line clean and intact receiving his continuous Dobutamine. All due care rendered. No c/o voiced. Pt left in stable condition with family

## 2022-04-01 NOTE — PROGRESS NOTES
SBAR hand off taken from Kay TALAMANTES. Pt is awake and alert in bed, AOX4 able to make needs known. Family member at bedside. Pt denies having any pain at this time. Dobutamine infusing @ 2.5mcg/kg/min via JULIETH dbl lumen PICC. Dressing c/d/i. Proximal/unused  lumen flushes, no blood return noted. Questions/concerns addressed. Safety measures in place. Bedside table, hand held and belongings are within reach. NAD noted. Will continue to monitor.

## 2022-04-01 NOTE — PLAN OF CARE
SW forwarded a message in [place] to [facility] rep to see if they had any beds available today. If not, the patient will go home with HH and IV infusion for his heart medication, Dobutumine. Will follow for updates and discharge needs.    Adwoa Wells, [facility]  [phone]

## 2022-04-01 NOTE — ASSESSMENT & PLAN NOTE
71 M admitted with urosepsis. Associated symptoms include generalized abdominal. HD stable. QSOFA: hypotension (sBP < 100). WBC 11. Lactate 1.8. UA consistent with UTI. CXR negative. CT A/P showed diverticulitis along with concern for ATN vs. Pyelonephritis.   Patient has a chronic PICC line placed for dobutamine infusion that was clean, dry, without signs of infection.     DDx: urosepsis, pyelonephritis, acute mesenteric ischemia, bacteremia, diverticulitis. Lower concern for pyelonephritis given no leukocytosis and constitutional symptoms prior to admission. Less concerned for acute mesenteric ischemia given no pain on PO intake along with decrease in generalized pain since presentation to ED.       Plan:  - Received levofloxacin 750 mg x 1 dose in ED.   - Ceftriaxone started on 3/29. Transitioned to cefpodoxime 4/1  - urine cx:E. Coli sensitive to ceftriaxone. Will DC with 4 more days of cefpodoxime  - Telemetry  - F/u BCx, UCx  - Strict I/Os  - Goal MAP >65

## 2022-04-01 NOTE — SUBJECTIVE & OBJECTIVE
Interval History: NAOE, diarrhea improved, very mild abdominal pain. Improved knee pain, but still quite tender. Cr 2.4 this morning.     Review of Systems   Constitutional:  Negative for chills, diaphoresis and fever.   HENT:  Negative for postnasal drip, sinus pressure and trouble swallowing.    Eyes:  Negative for visual disturbance.   Respiratory:  Negative for shortness of breath.    Cardiovascular:  Negative for chest pain and palpitations.   Gastrointestinal:  Positive for abdominal pain and diarrhea. Negative for constipation, nausea and vomiting.   Genitourinary:  Negative for dysuria.   Musculoskeletal:  Negative for back pain.   Skin:  Negative for rash.   Allergic/Immunologic: Negative for immunocompromised state.   Neurological:  Negative for syncope and headaches.   Hematological:  Does not bruise/bleed easily.   Psychiatric/Behavioral:  Negative for confusion and decreased concentration.    Objective:     Vital Signs (Most Recent):  Temp: 97.4 °F (36.3 °C) (04/01/22 0745)  Pulse: 82 (04/01/22 0745)  Resp: (!) 22 (03/31/22 2131)  BP: 130/64 (04/01/22 0851)  SpO2: 97 % (04/01/22 0706)   Vital Signs (24h Range):  Temp:  [97.4 °F (36.3 °C)-98.2 °F (36.8 °C)] 97.4 °F (36.3 °C)  Pulse:  [71-95] 82  Resp:  [18-22] 22  SpO2:  [90 %-100 %] 97 %  BP: (105-162)/(53-92) 130/64     Weight: 95.7 kg (211 lb)  Body mass index is 33.05 kg/m².    Intake/Output Summary (Last 24 hours) at 4/1/2022 0942  Last data filed at 4/1/2022 0413  Gross per 24 hour   Intake 982.09 ml   Output 575 ml   Net 407.09 ml      Physical Exam  Vitals and nursing note reviewed.   Constitutional:       Appearance: He is obese.   HENT:      Head: Normocephalic and atraumatic.      Nose: Nose normal.      Mouth/Throat:      Mouth: Mucous membranes are moist.      Pharynx: No oropharyngeal exudate or posterior oropharyngeal erythema.   Eyes:      General: No scleral icterus.  Cardiovascular:      Rate and Rhythm: Normal rate and regular rhythm.       Pulses: Normal pulses.      Heart sounds: Normal heart sounds. No murmur heard.    No friction rub. No gallop.   Pulmonary:      Effort: Pulmonary effort is normal. No respiratory distress.      Breath sounds: Normal breath sounds. No wheezing, rhonchi or rales.   Abdominal:      General: Bowel sounds are normal. There is distension.      Palpations: Abdomen is soft.      Tenderness: There is no abdominal tenderness. There is no guarding.   Musculoskeletal:         General: Normal range of motion.      Cervical back: Normal range of motion.      Right lower leg: No edema.      Left lower leg: No edema.      Comments: Medial right knee pain. No swelling, erythema, warmth, wound   Skin:     General: Skin is warm and dry.      Capillary Refill: Capillary refill takes less than 2 seconds.      Findings: No bruising.   Neurological:      General: No focal deficit present.      Mental Status: He is alert and oriented to person, place, and time.   Psychiatric:         Mood and Affect: Mood normal.         Behavior: Behavior normal.       Significant Labs: All pertinent labs within the past 24 hours have been reviewed.  CBC:   Recent Labs   Lab 03/31/22  0317 04/01/22  0243   WBC 9.07 8.83   HGB 11.3* 11.3*   HCT 36.1* 36.6*    339     CMP:   Recent Labs   Lab 03/31/22  0317 03/31/22  1223 04/01/22  0250    134* 135*   K 4.3 4.6 5.0    100 101   CO2 21* 25 20*   GLU 80 95 109   BUN 38* 35* 42*   CREATININE 2.8* 2.5* 2.4*   CALCIUM 9.2 8.9 9.1   PROT 5.9*  --  6.3   ALBUMIN 2.6*  --  2.7*   BILITOT 0.2  --  0.2   ALKPHOS 86  --  88   AST 11  --  12   ALT 5*  --  6*   ANIONGAP 14 9 14   EGFRNONAA 22.0* 25.3* 26.5*         Significant Imaging: I have reviewed all pertinent imaging results/findings within the past 24 hours.

## 2022-04-01 NOTE — ASSESSMENT & PLAN NOTE
Urine culture with E. Coli sensitive to erta, janel, ctx, cefazolin, bactrim.     - patient allergic to keflex  - on Ceftriaxone. Transitioned to cefpodoxime on 4/1

## 2022-04-01 NOTE — PLAN OF CARE
[facility] - Int[facility] Care ([facility]  Discharge Final Note    Primary Care Provider: January Khan MD  Expected Discharge Date: 4/1/2022    Patient medically ready for discharge to home with IV infusion through [facility] and HH through [facility].  Nurse can call report to n/a.  Transportation yes per sister.   Is family/patient aware of discharge yes - sister.  Hospital follow up scheduled, yes, in AVS.  Final Discharge Note (most recent)       Final Note - 04/01/22 1541          Final Note    Assessment Type Final Discharge Note (P)      Anticipated Discharge Disposition Home-Health Care Svc (P)      What phone number can be called within the next 1-3 days to see how you are doing after discharge? [phone] (P)      Hospital Resources/Appts/Education Provided Appointments scheduled and added to AVS (P)         Post-Acute Status    Post-Acute Authorization IV Infusion;Home Health (P)      Home Health Status Referrals Sent (P)      Coverage [facility] (P)      IV Infusion Status Set-up Complete/Auth obtained (P)      Discharge Delays None known at this time (P)                      Important Message from Medicare  Important Message from Medicare regarding Discharge Appeal Rights: Explained to patient/caregiver, Given to patient/caregiver, Signed/date by patient/caregiver, Other (comments) (verbal: spoke with Riana)   Date IMM was signed: 04/01/22  Time IMM was signed: 1317  Referral Info (most recent)       Referral Info    No documentation.                 Contact Info       Migue Henry Jr, MD   Specialty: Transplant, Cardiology    [address] [facility] [address]   Phone: [phone]       Next Steps: Schedule an appointment as soon as possible for a visit in 2 week(s)    January Khan MD   Specialty: Internal Medicine   Relationship: PCP - General    [address]  [place] [address]   Phone: [phone]       Next Steps: Schedule an appointment as soon as possible for a visit  in 2 week(s)          Future Appointments   Date Time Provider Department Center   4/8/2022 10:00 AM MD SAKSHI Coe MICHAEL STATON   5/9/2022 10:00 AM HOME MONITOR DEVICE CHECK, [facility] JACQUELINE Meraz  Case Management  Ext: [phone]  04/01/2022

## 2022-04-01 NOTE — RESPIRATORY THERAPY
RAPID RESPONSE RESPIRATORY CHART CHECK       Chart check completed, instructed to call [phone] for further concerns or assistance.

## 2022-04-02 NOTE — DISCHARGE INSTRUCTIONS
As we explained, you need to contact the provider of your pump with regards to questions on training and programming.  In addition, you can ask further questions to your home health nurse tomorrow.    Return to the ER for any worsening symptoms.

## 2022-04-02 NOTE — ED PROVIDER NOTES
Encounter Date: 4/1/2022       History     Chief Complaint   Patient presents with    Pump Problem     Pt's dobutamine pump turned off 15 minutes PTA and infusion stopped. - Family member arrived and fixed pump. Dobutamine infusing      70 yo M with pmhx CAD, HFrEF (10-20%) on chronic dobutamine gtt s/p AICD, h/o PE, HTN, HLD presents with chief complaint of dobutamine pump malfunction. Pt was started on Dobutamine gtt somewhat recently. Last week he was in a rehab that managed it. He was discharged home last Thursday. On Friday he went to the ER and was hospitalized for a UTI and c. Diff.  He was discharged today on cefpodoxime for UTI, vancomycin for C diff, and prednisone for acute gout.  This evening at approximately 8:16 p.m., he heard an alarm on his pump go off.  It would not go off any had a bunch of buttons and then it stopped infusing.  Subsequently came to the ER.  His sister was able to restart him on a different pump on her arrival.  That was at 8:24 p.m..  Despite looking at the phone times, in calculating 8 minutes being off dobutamine, he estimates was closer to 15. He denies any chest pain, weakness, lightheadedness.  He does report some paresthesias in bilateral feet.  He reports feeling anxious. Dobutamine pump supplied by [employer].        Review of patient's allergies indicates:   Allergen Reactions    Iodine containing multivitamin Swelling     itching    Keflex [cephalexin] Swelling     Eyes.  Tolerated multiple doses of zosyn and 1 dose of augmentin in 2015 and 2016, respectively    Peaches [peach (prunus persica)] Swelling     eyes    Shellfish containing products Swelling    Fig tree Swelling     itching    Tuberculin spenser test ppd Rash     Past Medical History:   Diagnosis Date    Acute hypoxemic respiratory failure 11/7/2015    Acute idiopathic gout of left knee 12/2/2019    Acute idiopathic gout of right elbow 9/23/2021    AICD (automatic cardioverter/defibrillator) present  12/13/2015      Anticoagulant long-term use     Bronchopneumonia 12/20/2021    CHF (congestive heart failure)     Chronic anticoagulation 5/5/2016    Chronic combined systolic and diastolic heart failure 11/26/2012    EF 10-20% on ECHO 2013    Chronic gout 12/2/2019    Clotting disorder     Coronary artery disease involving native coronary artery of native heart without angina pectoris 11/26/2012    Cath 10- Stents patent non-obstructive disease Cath 11-12015 non-obstructive disease     COVID-19 08/2021    Had antibody infusion    Diverticulosis of colon     DVT (deep venous thrombosis), unspecified laterality 11/12/2015    Dysphonia 1/28/2018    Essential hypertension 11/15/2015    Fine motor impairment 2/2/2021    Hyperlipidemia     Hypertensive heart disease with heart failure 5/5/2016    MI (myocardial infarction) 2009    x's 5    Nicotine abuse     Obesity 11/26/2012    Olecranon bursitis of right elbow 9/19/2021    Pulmonary embolism 2011    Renal disorder     LAVERNE    Right carpal tunnel syndrome 4/6/2018    Stented coronary artery 11/26/2012    LAD stent placed 10/17/2007     Trigger thumb of right hand 4/6/2018     Past Surgical History:   Procedure Laterality Date    APPENDECTOMY      BACK SURGERY      CARDIAC DEFIBRILLATOR PLACEMENT      CARDIAC SURGERY  2007    stent    CARPAL TUNNEL RELEASE Right 04/2018    INSERTION OF BIVENTRICULAR IMPLANTABLE CARDIOVERTER-DEFIBRILLATOR (ICD) N/A 5/3/2019    Procedure: INSERTION, ICD, BIVENTRICULAR;  Surgeon: Teofilo Pal MD;  Location: [facility] EP LAB;  Service: Cardiology;  Laterality: N/A;  ICH CM,  ICD UPGD BI-V,  SJM, MAC, FAS, 3PREP (dual ICD INSITU)    r knee scope      REVISION OF SKIN POCKET FOR CARDIOVERTER-DEFIBRILLATOR Left 5/3/2019    Procedure: Revision, Skin Pocket, For Cardioverter-Defibrillator;  Surgeon: Teofilo Pal MD;  Location: [facility] EP LAB;  Service: Cardiology;  Laterality: Left;     RIGHT HEART CATHETERIZATION Right 2021    Procedure: INSERTION, CATHETER, RIGHT HEART;  Surgeon: Lizz Nieto MD;  Location: [facility] CATH LAB;  Service: Cardiology;  Laterality: Right;    SPINE SURGERY      TONSILLECTOMY      TRIGGER FINGER RELEASE Right 2018    thumb     Family History   Problem Relation Age of Onset    Cancer Mother         colon cancer    Heart disease Mother     Diabetes Mother     Heart disease Father     Stroke Father     Colon polyps Father     Cancer Paternal Grandmother         leukemia    No Known Problems Sister     No Known Problems Daughter     No Known Problems Son     No Known Problems Sister     No Known Problems Son      Social History     Tobacco Use    Smoking status: Former Smoker     Packs/day: 1.00     Years: 45.00     Pack years: 45.00     Types: Cigarettes     Quit date: 2005     Years since quittin.3    Smokeless tobacco: Never Used    Tobacco comment: 1-1.5 ppd every day.   Substance Use Topics    Alcohol use: No    Drug use: No     Review of Systems   Constitutional: Negative for fever.   Respiratory: Negative for shortness of breath.    Cardiovascular: Negative for chest pain.   Gastrointestinal: Negative for abdominal pain.   Musculoskeletal: Negative for back pain.   Neurological: Positive for numbness.   Psychiatric/Behavioral: Negative for confusion. The patient is nervous/anxious.        Physical Exam     Initial Vitals [22]   BP Pulse Resp Temp SpO2   131/61 92 18 98.2 °F (36.8 °C) 98 %      MAP       --         Physical Exam    Nursing note and vitals reviewed.  Constitutional: He appears well-developed and well-nourished. He is not diaphoretic. No distress.   HENT:   Head: Normocephalic.   Eyes: No scleral icterus.   Neck: Neck supple.   Cardiovascular: Normal rate and regular rhythm.   Murmur heard.  Dobutamine pump infusing without difficulty   Pulmonary/Chest: Breath sounds normal. No respiratory distress. He  has no wheezes. He has no rhonchi. He has no rales.   Abdominal: Abdomen is soft. There is no abdominal tenderness.   Musculoskeletal:         General: Normal range of motion.      Cervical back: Neck supple.     Neurological: He is alert.   Skin: Skin is warm.   Psychiatric: He has a normal mood and affect.         ED Course   Procedures  Labs Reviewed   HEPATITIS C ANTIBODY          Imaging Results    None          Medications - No data to display  Medical Decision Making:   History:   Old Medical Records: I decided to obtain old medical records.  Initial Assessment:   72 yo M with pmhx CAD, HFrEF (10-20%) on chronic dobutamine gtt s/p AICD, h/o PE, HTN, HLD presents with chief complaint of dobutamine pump malfunction.  Differential Diagnosis:   Doubtamine pump malfunction  ED Management:  Pt normotensive, mentating well, clear lungs, does not appear in distress or shock.  Patient and his sister request eating for device.  Spoke with cards fellow who does not know how to use the device and recommend calling CICU charge nurse.  [facility] who also does not know how to operate the device. He spoke with cardiac stepdown unit nurses who do not know, but note that it is typically taught by a home health rep arranged by case management.  I spoke with our ED pharmacist.  We took a look at the pump together with the patient and explained that questions about the pump need to be answered by the company that supplied the pump.  We were able to locate the company and explained to the patient that he needs to contact them for programming and dose verification.  Additionally, he has home health coming to his house tomorrow and they can review this again a 2nd time.  Patient and his sister are comfortable with discharge.  Patient reports that his paresthesias in his feet have resolved.                      Clinical Impression:   Final diagnoses:  [Z13.9] Encounter for medical screening examination  (Primary)          ED Disposition Condition    Discharge Stable        ED Prescriptions     None        Follow-up Information    None          Tony Fuentes MD  04/01/22 [id number]

## 2022-04-02 NOTE — PROGRESS NOTES
SW was asked by doctor to check to see about a patient's device that he was discharged with. Evidentially this patient does not know how to use this device on his own. SW read notes in chart. Patient has HH and gets in IV Fusions with [facility].     SW contacted [facility] and spoke with [place]. Patient can get instructions over the phone on how to work the device from home. Patient has to call [phone]. SW gave info to pharmacy to give to doctor.     JHONY Gutierrez, MSW-LMSW  Medical Social Worker/  ER Department

## 2022-04-02 NOTE — ED TRIAGE NOTES
Audrey GEORGE Thad Mccarthy, an 71 y.o. male presents to the ED from home.  Patient wants someone to look at his dobutamine pump and teach him how to operate it.      Chief Complaint   Patient presents with    Pump Problem     Pt's dobutamine pump turned off 15 minutes PTA and infusion stopped. - Family member arrived and fixed pump. Dobutamine infusing      Review of patient's allergies indicates:   Allergen Reactions    Iodine containing multivitamin Swelling     itching    Keflex [cephalexin] Swelling     Eyes.  Tolerated multiple doses of zosyn and 1 dose of augmentin in 2015 and 2016, respectively    Peaches [peach (prunus persica)] Swelling     eyes    Shellfish containing products Swelling    Fig tree Swelling     itching    Tuberculin spenser test ppd Rash     Past Medical History:   Diagnosis Date    Acute hypoxemic respiratory failure 11/7/2015    Acute idiopathic gout of left knee 12/2/2019    Acute idiopathic gout of right elbow 9/23/2021    AICD (automatic cardioverter/defibrillator) present 12/13/2015      Anticoagulant long-term use     Bronchopneumonia 12/20/2021    CHF (congestive heart failure)     Chronic anticoagulation 5/5/2016    Chronic combined systolic and diastolic heart failure 11/26/2012    EF 10-20% on ECHO 2013    Chronic gout 12/2/2019    Clotting disorder     Coronary artery disease involving native coronary artery of native heart without angina pectoris 11/26/2012    Cath 10- Stents patent non-obstructive disease Cath 11-12015 non-obstructive disease     COVID-19 08/2021    Had antibody infusion    Diverticulosis of colon     DVT (deep venous thrombosis), unspecified laterality 11/12/2015    Dysphonia 1/28/2018    Essential hypertension 11/15/2015    Fine motor impairment 2/2/2021    Hyperlipidemia     Hypertensive heart disease with heart failure 5/5/2016    MI (myocardial infarction) 2009    x's 5    Nicotine abuse     Obesity 11/26/2012     Olecranon bursitis of right elbow 9/19/2021    Pulmonary embolism 2011    Renal disorder     LAVERNE    Right carpal tunnel syndrome 4/6/2018    Stented coronary artery 11/26/2012    LAD stent placed 10/17/2007     Trigger thumb of right hand 4/6/2018

## 2022-04-04 NOTE — PROGRESS NOTES
C3 nurse attempted to contact Audrey Oneil Jr. for a TCC post hospital discharge follow up call. No answer. Left voicemail with callback information. The patient has a scheduled HOSFU appointment with Cornell Franco MD on 04/08/22 @ 0916.

## 2022-04-06 PROBLEM — D72.829 LEUKOCYTOSIS: Status: ACTIVE | Noted: 2022-01-01

## 2022-04-06 PROBLEM — M10.9 GOUT: Status: ACTIVE | Noted: 2019-12-02

## 2022-04-06 NOTE — ED NOTES
Audrey ARIEL Oneil Jr., a 71 y.o. male presents to the ED w/ complaint of shortness of breath    Triage note:  Chief Complaint   Patient presents with    Shortness of Breath     Sudden onset SOB. Home dobutamine pump. Hx stemi,pe, heart failure.     Review of patient's allergies indicates:   Allergen Reactions    Iodine containing multivitamin Swelling     itching    Keflex [cephalexin] Swelling     Eyes.  Tolerated multiple doses of zosyn and 1 dose of augmentin in 2015 and 2016, respectively    Peaches [peach (prunus persica)] Swelling     eyes    Shellfish containing products Swelling    Fig tree Swelling     itching    Tuberculin spenser test ppd Rash     Past Medical History:   Diagnosis Date    Acute hypoxemic respiratory failure 11/7/2015    Acute idiopathic gout of left knee 12/2/2019    Acute idiopathic gout of right elbow 9/23/2021    AICD (automatic cardioverter/defibrillator) present 12/13/2015      Anticoagulant long-term use     Bronchopneumonia 12/20/2021    CHF (congestive heart failure)     Chronic anticoagulation 5/5/2016    Chronic combined systolic and diastolic heart failure 11/26/2012    EF 10-20% on ECHO 2013    Chronic gout 12/2/2019    Clotting disorder     Coronary artery disease involving native coronary artery of native heart without angina pectoris 11/26/2012    Cath 10- Stents patent non-obstructive disease Cath 11-12015 non-obstructive disease     COVID-19 08/2021    Had antibody infusion    Diverticulosis of colon     DVT (deep venous thrombosis), unspecified laterality 11/12/2015    Dysphonia 1/28/2018    Essential hypertension 11/15/2015    Fine motor impairment 2/2/2021    Hyperlipidemia     Hypertensive heart disease with heart failure 5/5/2016    MI (myocardial infarction) 2009    x's 5    Nicotine abuse     Obesity 11/26/2012    Olecranon bursitis of right elbow 9/19/2021    Pulmonary embolism 2011    Renal disorder     LAVERNE    Right  carpal tunnel syndrome 4/6/2018    Stented coronary artery 11/26/2012    LAD stent placed 10/17/2007     Trigger thumb of right hand 4/6/2018

## 2022-04-06 NOTE — ED PROVIDER NOTES
Encounter Date: 4/6/2022       History     Chief Complaint   Patient presents with    Shortness of Breath     Sudden onset SOB. Home dobutamine pump. Hx stemi,pe, heart failure.     HPI   Patient is a 70yo M with PMHx of HFrEF 10-15%, on dobutamine, recent PEA arrest (nonobstructive disease), gout, history of vtach, and history of PE/DVT who presents with sudden onset SOB at home. He reports that he's been noticing some RLE swelling and abd tightness over the past day, and took a lasix pill (40mg PO)_to see if that would help, but then started with SOB this evening. Denies CP. He knows his heart is beating fast, but denies palpitations. Denies joint pain. Was placed on supplemental oxygen by EMS for comfort, which patient reports is not helping much, but O2 sats were 97% on RA. SOB worsened by any movement, doesn't seem to be alleviated with any, has been constant since onset. He was recently admitted for pyelo, and was instructed to hold his lasix at discharge until he followed up as an outpatient, so has not taken it in a number of days until today, after the LE swelling started. Denies leg pain.    Denies recent fevers, cough, congestion, nausea, vomiting, diarrhea, sick contacts.    Review of patient's allergies indicates:   Allergen Reactions    Iodine containing multivitamin Swelling     itching    Keflex [cephalexin] Swelling     Eyes.  Tolerated multiple doses of zosyn and 1 dose of augmentin in 2015 and 2016, respectively    Peaches [peach (prunus persica)] Swelling     eyes    Shellfish containing products Swelling    Fig tree Swelling     itching    Tuberculin spenser test ppd Rash     Past Medical History:   Diagnosis Date    Acute hypoxemic respiratory failure 11/7/2015    Acute idiopathic gout of left knee 12/2/2019    Acute idiopathic gout of right elbow 9/23/2021    AICD (automatic cardioverter/defibrillator) present 12/13/2015      Anticoagulant long-term use     Bronchopneumonia  12/20/2021    CHF (congestive heart failure)     Chronic anticoagulation 5/5/2016    Chronic combined systolic and diastolic heart failure 11/26/2012    EF 10-20% on ECHO 2013    Chronic gout 12/2/2019    Clotting disorder     Coronary artery disease involving native coronary artery of native heart without angina pectoris 11/26/2012    Cath 10- Stents patent non-obstructive disease Cath 11-12015 non-obstructive disease     COVID-19 08/2021    Had antibody infusion    Diverticulosis of colon     DVT (deep venous thrombosis), unspecified laterality 11/12/2015    Dysphonia 1/28/2018    Essential hypertension 11/15/2015    Fine motor impairment 2/2/2021    Hyperlipidemia     Hypertensive heart disease with heart failure 5/5/2016    MI (myocardial infarction) 2009    x's 5    Nicotine abuse     Obesity 11/26/2012    Olecranon bursitis of right elbow 9/19/2021    Pulmonary embolism 2011    Renal disorder     LAVERNE    Right carpal tunnel syndrome 4/6/2018    Stented coronary artery 11/26/2012    LAD stent placed 10/17/2007     Trigger thumb of right hand 4/6/2018     Past Surgical History:   Procedure Laterality Date    APPENDECTOMY      BACK SURGERY      CARDIAC DEFIBRILLATOR PLACEMENT      CARDIAC SURGERY  2007    stent    CARPAL TUNNEL RELEASE Right 04/2018    INSERTION OF BIVENTRICULAR IMPLANTABLE CARDIOVERTER-DEFIBRILLATOR (ICD) N/A 5/3/2019    Procedure: INSERTION, ICD, BIVENTRICULAR;  Surgeon: Teofilo Pal MD;  Location: [facility] EP LAB;  Service: Cardiology;  Laterality: N/A;  ICH CM,  ICD UPGD BI-V,  SJM, MAC, FAS, 3PREP (dual ICD INSITU)    r knee scope      REVISION OF SKIN POCKET FOR CARDIOVERTER-DEFIBRILLATOR Left 5/3/2019    Procedure: Revision, Skin Pocket, For Cardioverter-Defibrillator;  Surgeon: Teofilo Pal MD;  Location: [facility] EP LAB;  Service: Cardiology;  Laterality: Left;    RIGHT HEART CATHETERIZATION Right 6/14/2021    Procedure: INSERTION, CATHETER,  RIGHT HEART;  Surgeon: Lizz Nieto MD;  Location: [facility] CATH LAB;  Service: Cardiology;  Laterality: Right;    SPINE SURGERY      TONSILLECTOMY      TRIGGER FINGER RELEASE Right 2018    thumb     Family History   Problem Relation Age of Onset    Cancer Mother         colon cancer    Heart disease Mother     Diabetes Mother     Heart disease Father     Stroke Father     Colon polyps Father     Cancer Paternal Grandmother         leukemia    No Known Problems Sister     No Known Problems Daughter     No Known Problems Son     No Known Problems Sister     No Known Problems Son      Social History     Tobacco Use    Smoking status: Former Smoker     Packs/day: 1.00     Years: 45.00     Pack years: 45.00     Types: Cigarettes     Quit date: 2005     Years since quittin.3    Smokeless tobacco: Never Used    Tobacco comment: 1-1.5 ppd every day.   Substance Use Topics    Alcohol use: No    Drug use: No     Review of Systems   Constitutional: Negative for chills and fever.   HENT: Negative for rhinorrhea and sore throat.    Eyes: Negative for pain and visual disturbance.   Respiratory: Positive for shortness of breath. Negative for cough.    Cardiovascular: Positive for leg swelling. Negative for chest pain and palpitations.   Gastrointestinal: Positive for abdominal distention. Negative for abdominal pain, nausea and vomiting.   Genitourinary: Negative for dysuria and frequency.   Musculoskeletal: Negative for back pain, gait problem and myalgias.   Skin: Negative for color change and rash.   Neurological: Negative for syncope, weakness and light-headedness.   Hematological: Does not bruise/bleed easily.       Physical Exam     Initial Vitals   BP Pulse Resp Temp SpO2   22 0349 22 0349 22 0349 22 0352 22 0349   (!) 152/98 110 (!) 26 98.8 °F (37.1 °C) 97 %      MAP       --                Physical Exam    Nursing note and vitals reviewed.  Constitutional: He  appears well-developed and well-nourished. He is not diaphoretic.   HENT:   Head: Normocephalic and atraumatic.   Eyes: Conjunctivae and EOM are normal.   Neck: Neck supple.   Normal range of motion.  Cardiovascular: Normal rate, regular rhythm, normal heart sounds and intact distal pulses.   No murmur heard.  Pulmonary/Chest: He is in respiratory distress. He has no wheezes. He has rales. He exhibits no tenderness.   Abdominal: Abdomen is soft. Bowel sounds are normal. He exhibits no distension. There is no abdominal tenderness.   Musculoskeletal:         General: Edema (2+ pitting to right knee, 2+ pitting to left ankle) present. No tenderness. Normal range of motion.      Cervical back: Normal range of motion and neck supple.     Neurological: He is alert. He has normal strength. No sensory deficit.   Skin: Skin is warm and dry. Capillary refill takes less than 2 seconds. No rash noted. No erythema.         ED Course   Procedures  Labs Reviewed   COMPREHENSIVE METABOLIC PANEL - Abnormal; Notable for the following components:       Result Value    Chloride 113 (*)     CO2 18 (*)     BUN 35 (*)     Albumin 3.2 (*)     All other components within normal limits    Narrative:     Release to patient->Immediate   B-TYPE NATRIURETIC PEPTIDE - Abnormal; Notable for the following components:    BNP 1,396 (*)     All other components within normal limits    Narrative:     Release to patient->Immediate   CBC W/ AUTO DIFFERENTIAL - Abnormal; Notable for the following components:    WBC 18.08 (*)     RBC 4.38 (*)     Hemoglobin 12.4 (*)     Hematocrit 39.6 (*)     MCHC 31.3 (*)     RDW 16.6 (*)     Immature Granulocytes 3.9 (*)     Gran # (ANC) 13.3 (*)     Immature Grans (Abs) 0.70 (*)     Gran % 73.5 (*)     Lymph % 16.9 (*)     All other components within normal limits    Narrative:     Release to patient->Immediate   URINALYSIS, REFLEX TO URINE CULTURE - Abnormal; Notable for the following components:    Color, UA  Colorless (*)     All other components within normal limits    Narrative:     Specimen Source->Urine   TROPONIN I - Abnormal; Notable for the following components:    Troponin I 0.102 (*)     All other components within normal limits    Narrative:     Release to patient->Immediate   ISTAT PROCEDURE - Abnormal; Notable for the following components:    POC PCO2 34.3 (*)     POC HCO3 19.0 (*)     POC SATURATED O2 87 (*)     POC TCO2 20 (*)     All other components within normal limits   MAGNESIUM    Narrative:     Release to patient->Immediate   HEPATITIS C ANTIBODY          Imaging Results          X-Ray Chest AP Portable (Final result)  Result time 04/06/22 05:04:10    Final result by Denton Samayoa MD (04/06/22 05:04:10)                 Impression:      Cardiomegaly with bilateral edema.  AICD present.    No significant change from prior study.      Electronically signed by: Denton Samayoa MD  Date:    04/06/2022  Time:    05:04             Narrative:    EXAMINATION:  XR CHEST AP PORTABLE    CLINICAL HISTORY:  sob;    TECHNIQUE:  Single frontal view of the chest was performed.    COMPARISON:  03/28/2022.    FINDINGS:  AICD leads appear unchanged.  Right PICC line appears unchanged.    Cardiomegaly with bilateral edema.    Heart and lungs  appear unchanged when allowing for differences in technique and positioning.                                 Medications   magnesium sulfate 2g in water 50mL IVPB (premix) (2 g Intravenous New Bag 4/6/22 0627)   furosemide injection 40 mg (40 mg Intravenous Given 4/6/22 5264)     Medical Decision Making:   History:   I obtained history from: someone other than patient and EMS provider.  Old Medical Records: I decided to obtain old medical records.  Old Records Summarized: records from clinic visits, records from previous admission(s) and records from another hospital.  Initial Assessment:   70yo M w SOB and LE edema, presenting w inc WOB and tachypnea.  Differential Diagnosis:    Volume overload, PNA, PE, arrhythmia, anxiety, DVT, expanding abd hematoma  Clinical Tests:   Lab Tests: Reviewed and Ordered  Radiological Study: Ordered  Medical Tests: Ordered and Reviewed  ED Management:  Placed on bipap for significant inc WOB and tachypnea upon arrival, sating well on RA. Given IV lasix with some improvement in respiratory status. Labs showing elevated WBC count - unclear etiology. CT scan ordered as noted below. Will re-evaluate for comfort off bipap following imaging, anticiapte admission. Abx held as patient has no fever or localizing symptoms. UA clean. The care of this patient signed out to the on-coming team.            Attending Attestation:   Physician Attestation Statement for Resident:  As the supervising MD   Physician Attestation Statement: I have personally seen and examined this patient.   I agree with the above history. -:   As the supervising MD I agree with the above PE.    As the supervising MD I agree with the above treatment, course, plan, and disposition.                ED Course as of 04/06/22 0643   Wed Apr 06, 2022   0511 WBC(!): 18.08 [NH]   0511 Hemoglobin(!): 12.4  At baseline [NH]   0513 Platelets: 426 [NH]   0514 X-Ray Chest AP Portable  Volume overload, cardiomegaly per my independent interpretation.     [DC]   0515 EKG 12-lead  Paced, tachycardic, no significant change from previous per my independent interpretation.  . [DC]   0556 Chart review of imaging with CTA abd & pelvis showing rectus sheath hematoma, perinephric stranding, and possible early diverticulitis. With elevated WBC count and reported worsening abd distention/tightness over areas of ecchymosis, will get CT AP. [NH]   0638 BNP(!): 1,396  Baseline 200s-300s [DC]   0640 Respiratory status improved, will need re-evaluated after imaging. [NH]      ED Course User Index  [DC] Jaxson Schilling MD  [NH] Hiwot Hilario MD           Critical Care   Date: 04/06/2022  Performed by: Jaxson Schilling MD    Authorized by: Jaxson Schilling MD    Total critical care time (exclusive of procedural time) : 35 minutes  Critical care was necessary to treat or prevent imminent or life-threatening deterioration of the following conditions:  Advanced HF, respiratory failure requiring BiPAP    Clinical Impression:   Final diagnoses:  [R06.02] Shortness of breath  [R06.02] SOB (shortness of breath)  [M79.89] Leg swelling  [I50.43] Acute on chronic heart failure with reduced ejection fraction and diastolic dysfunction (Primary)          ED Disposition Condition    Admit               Hiwot Hilario MD  Resident  04/06/22 [id number]       Jaxson Schilling MD  04/06/22 [id number]

## 2022-04-06 NOTE — ASSESSMENT & PLAN NOTE
72 y/o M hx ICM LVEF 15% on palli dobutamine and LBBB, s/p BiV-ICD, CAD, HTN, HLD presenting with acute on chronic CHF.     Suspect decompensated heart failure in setting of recent discontinuation of GDMT, diuretic given concern for LAVERNE. Suspect LAVERNE may have been related to sepsis, UTI and that patient has been volume overloaded for several days with HTN exacerbating current episode.    - continue home dobutamine (hold all beta blockers)  - start entresto 24-26 bid  - continue IV lasix 40mg bid (good response after first dose, goal net negative 2-3L daily)  - Hold off on spironolactone for now given recent LAVERNE  - Would work up for possible pneumonia and other potential causes of sepsis as well given shortness of breath, WBC and possible infiltrate on CXR despite antibiotic therapy for UTI/C diff

## 2022-04-06 NOTE — SUBJECTIVE & OBJECTIVE
Past Medical History:   Diagnosis Date    Acute hypoxemic respiratory failure 11/7/2015    Acute idiopathic gout of left knee 12/2/2019    Acute idiopathic gout of right elbow 9/23/2021    AICD (automatic cardioverter/defibrillator) present 12/13/2015      Anticoagulant long-term use     Bronchopneumonia 12/20/2021    CHF (congestive heart failure)     Chronic anticoagulation 5/5/2016    Chronic combined systolic and diastolic heart failure 11/26/2012    EF 10-20% on ECHO 2013    Chronic gout 12/2/2019    Clotting disorder     Coronary artery disease involving native coronary artery of native heart without angina pectoris 11/26/2012    Cath 10- Stents patent non-obstructive disease Cath 11-12015 non-obstructive disease     COVID-19 08/2021    Had antibody infusion    Diverticulosis of colon     DVT (deep venous thrombosis), unspecified laterality 11/12/2015    Dysphonia 1/28/2018    Essential hypertension 11/15/2015    Fine motor impairment 2/2/2021    Hyperlipidemia     Hypertensive heart disease with heart failure 5/5/2016    MI (myocardial infarction) 2009    x's 5    Nicotine abuse     Obesity 11/26/2012    Olecranon bursitis of right elbow 9/19/2021    Pulmonary embolism 2011    Renal disorder     LAVERNE    Right carpal tunnel syndrome 4/6/2018    Stented coronary artery 11/26/2012    LAD stent placed 10/17/2007     Trigger thumb of right hand 4/6/2018       Past Surgical History:   Procedure Laterality Date    APPENDECTOMY      BACK SURGERY      CARDIAC DEFIBRILLATOR PLACEMENT      CARDIAC SURGERY  2007    stent    CARPAL TUNNEL RELEASE Right 04/2018    INSERTION OF BIVENTRICULAR IMPLANTABLE CARDIOVERTER-DEFIBRILLATOR (ICD) N/A 5/3/2019    Procedure: INSERTION, ICD, BIVENTRICULAR;  Surgeon: Teofilo Pal MD;  Location: [facility] LAB;  Service: Cardiology;  Laterality: N/A;  ICH CM,  ICD UPGD BI-V,  SJM, MAC, FAS, 3PREP (dual ICD INSITU)    r knee scope      REVISION OF SKIN POCKET FOR  CARDIOVERTER-DEFIBRILLATOR Left 5/3/2019    Procedure: Revision, Skin Pocket, For Cardioverter-Defibrillator;  Surgeon: Teofilo Pal MD;  Location: [facility] LAB;  Service: Cardiology;  Laterality: Left;    RIGHT HEART CATHETERIZATION Right 2021    Procedure: INSERTION, CATHETER, RIGHT HEART;  Surgeon: Lizz Nieto MD;  Location: [facility] CATH LAB;  Service: Cardiology;  Laterality: Right;    SPINE SURGERY      TONSILLECTOMY      TRIGGER FINGER RELEASE Right 2018    thumb       Review of patient's allergies indicates:   Allergen Reactions    Iodine containing multivitamin Swelling     itching    Keflex [cephalexin] Swelling     Eyes.  Tolerated multiple doses of zosyn and 1 dose of augmentin in  and 2016, respectively    Peaches [peach (prunus persica)] Swelling     eyes    Shellfish containing products Swelling    Fig tree Swelling     itching    Tuberculin spenser test ppd Rash       No current facility-administered medications on file prior to encounter.     Current Outpatient Medications on File Prior to Encounter   Medication Sig    allopurinoL (ZYLOPRIM) 100 MG tablet Take 2 tablets (200 mg total) by mouth once daily.    amiodarone (PACERONE) 200 MG Tab TAKE 1 AND 1/2 TABLETS(300 MG) BY MOUTH EVERY DAY    aspirin (ECOTRIN) 81 MG EC tablet Take 1 tablet (81 mg total) by mouth once daily.    atorvastatin (LIPITOR) 80 MG tablet Take 1 tablet (80 mg total) by mouth once daily.    [] cefpodoxime (VANTIN) 200 MG tablet Take 1 tablet (200 mg total) by mouth every 12 (twelve) hours. for 4 days    diphenhydrAMINE (BENADRYL) 25 mg capsule Take 25 mg by mouth as needed for Allergies.    DOBUTamine (DOBUTREX) 500 mg/250 mL (2,000 mcg/mL) 2.5 mcg/kg/min  Patient is 95.7 kg    furosemide (LASIX) 40 MG tablet Take 1 tablet (40 mg total) by mouth once daily. HOLD UNTIL TOLD TO RESUME BY PHYSICIAN    metoprolol succinate (TOPROL-XL) 25 MG 24 hr tablet Take 1 tablet (25 mg total) by mouth once daily.     midodrine (PROAMATINE) 2.5 MG Tab Take 1 tablet (2.5 mg total) by mouth 3 (three) times daily. HOLD until follow up with cardiology    ondansetron (ZOFRAN-ODT) 4 MG TbDL Take 1 tablet (4 mg total) by mouth every 6 (six) hours as needed (nausea).    oxyCODONE-acetaminophen (PERCOCET) 5-325 mg per tablet Take 1 tablet by mouth every 6 (six) hours as needed for Pain.    paricalcitoL (ZEMPLAR) 1 MCG capsule Take 1 capsule (1 mcg total) by mouth once daily.    predniSONE (DELTASONE) 20 MG tablet Take 2 tablets (40 mg total) by mouth once daily. for 5 days    sacubitriL-valsartan (ENTRESTO) 24-26 mg per tablet Take 1 tablet by mouth 2 (two) times daily. HOLD UNTIL RESUMED BY CARDIOLOGIST    simethicone (MYLICON) 80 MG chewable tablet Take 1 tablet (80 mg total) by mouth every 6 (six) hours as needed for Flatulence.    vancomycin (VANCOCIN) 125 MG capsule Take 1 capsule (125 mg total) by mouth 4 (four) times daily. for 14 days    [DISCONTINUED] clopidogreL (PLAVIX) 75 mg tablet Take 1 tablet (75 mg total) by mouth once daily. (Patient not taking: Reported on 3/18/2022)    [DISCONTINUED] colchicine (COLCRYS) 0.6 mg tablet Take 1 tablet (0.6 mg total) by mouth once daily. (Patient not taking: Reported on 3/18/2022)    [DISCONTINUED] ipratropium (ATROVENT) 0.02 % nebulizer solution Take 2.5 mLs (500 mcg total) by nebulization 4 (four) times daily as needed for Wheezing. Rescue (Patient not taking: Reported on 3/29/2022)     Family History       Problem Relation (Age of Onset)    Cancer Mother, Paternal Grandmother    Colon polyps Father    Diabetes Mother    Heart disease Mother, Father    No Known Problems Sister, Daughter, Son, Sister, Son    Stroke Father          Tobacco Use    Smoking status: Former Smoker     Packs/day: 1.00     Years: 45.00     Pack years: 45.00     Types: Cigarettes     Quit date: 2005     Years since quittin.3    Smokeless tobacco: Never Used    Tobacco comment: 1-1.5 ppd every day.    Substance and Sexual Activity    Alcohol use: No    Drug use: No    Sexual activity: Never     Review of Systems   Constitutional: Positive for weight gain. Negative for chills and fever.   HENT: Negative.     Eyes: Negative.    Cardiovascular:  Positive for leg swelling and orthopnea. Negative for chest pain, claudication and paroxysmal nocturnal dyspnea.   Respiratory:  Positive for shortness of breath. Negative for cough and wheezing.    Endocrine: Negative.    Hematologic/Lymphatic: Does not bruise/bleed easily.   Skin:  Negative for nail changes and rash.   Musculoskeletal: Negative.  Negative for back pain.   Gastrointestinal:  Positive for bloating and abdominal pain. Negative for melena, nausea and vomiting.   Neurological:  Negative for dizziness and headaches.   Psychiatric/Behavioral:  Negative for altered mental status, depression and substance abuse.    Allergic/Immunologic: Negative.    Objective:     Vital Signs (Most Recent):  Temp: 97.4 °F (36.3 °C) (04/06/22 1343)  Pulse: 102 (04/06/22 1343)  Resp: 18 (04/06/22 1248)  BP: 125/68 (04/06/22 1343)  SpO2: (!) 94 % (04/06/22 1343)   Vital Signs (24h Range):  Temp:  [97.4 °F (36.3 °C)-98.8 °F (37.1 °C)] 97.4 °F (36.3 °C)  Pulse:  [] 102  Resp:  [17-49] 18  SpO2:  [94 %-100 %] 94 %  BP: (124-161)/(60-98) 125/68     Weight: 104.3 kg (230 lb)  Body mass index is 36.02 kg/m².    SpO2: (!) 94 %  O2 Device (Oxygen Therapy): nasal cannula      Intake/Output Summary (Last 24 hours) at 4/6/2022 135  Last data filed at 4/6/2022 0657  Gross per 24 hour   Intake --   Output 1050 ml   Net -1050 ml       Lines/Drains/Airways       Peripherally Inserted Central Catheter Line  Duration             PICC Double Lumen right basilic -- days              Drain  Duration             Male External Urinary Catheter 04/06/22 0452 <1 day                    Physical Exam  Constitutional:       Appearance: He is well-developed.   HENT:      Head: Normocephalic and  atraumatic.   Eyes:      Conjunctiva/sclera: Conjunctivae normal.      Pupils: Pupils are equal, round, and reactive to light.   Neck:      Vascular: No JVD.   Cardiovascular:      Rate and Rhythm: Regular rhythm. Tachycardia present.      Pulses: Intact distal pulses.      Heart sounds: Normal heart sounds. No murmur heard.    No friction rub. No gallop.   Pulmonary:      Effort: Pulmonary effort is normal.      Breath sounds: No stridor. No wheezing or rales.   Chest:      Chest wall: No tenderness.   Abdominal:      General: Bowel sounds are normal. There is distension.      Palpations: Abdomen is soft. There is no mass.      Tenderness: There is no abdominal tenderness. There is no guarding.   Musculoskeletal:         General: No tenderness or deformity.   Skin:     General: Skin is warm and dry.      Findings: No erythema or rash.   Neurological:      Mental Status: He is alert and oriented to person, place, and time.       Significant Labs: CMP   Recent Labs   Lab 04/06/22  0441      K 4.4   *   CO2 18*   GLU 94   BUN 35*   CREATININE 1.2   CALCIUM 9.3   PROT 6.8   ALBUMIN 3.2*   BILITOT 0.3   ALKPHOS 81   AST 22   ALT 17   ANIONGAP 12   ESTGFRAFRICA >60.0   EGFRNONAA >60.0    and CBC   Recent Labs   Lab 04/06/22  0441   WBC 18.08*   HGB 12.4*   HCT 39.6*          Significant Imaging: Echocardiogram: Transthoracic echo (TTE) complete (Cupid Only):   Results for orders placed or performed during the hospital encounter of 09/18/21   Echo   Result Value Ref Range    TDI SEPTAL 0.04 m/s    LV LATERAL E/E' RATIO 7.14 m/s    LV SEPTAL E/E' RATIO 12.50 m/s    LA WIDTH 3.92 cm    TDI LATERAL 0.07 m/s    LVIDd 8.00 (A) 3.5 - 6.0 cm    IVS 0.81 0.6 - 1.1 cm    Posterior Wall 0.81 0.6 - 1.1 cm    LVIDs 7.70 (A) 2.1 - 4.0 cm    FS 4 28 - 44 %    LA volume 67.23 cm3    Sinus 3.25 cm    STJ 2.89 cm    Ascending aorta 3.34 cm    LV mass 315.33 g    LA size 4.06 cm    RVDD 4.16 cm    TAPSE 2.09 cm    RV S'  "16.04 cm/s    Left Ventricle Relative Wall Thickness 0.20 cm    AV mean gradient 8 mmHg    AV valve area 2.33 cm2    AV Velocity Ratio 0.55     AV index (prosthetic) 0.59     MV valve area p 1/2 method 3.32 cm2    E/A ratio 0.63     Mean e' 0.06 m/s    E wave deceleration time 228.30 msec    MV "A" wave duration 9.71 msec    Pulm vein S/D ratio 1.13     LVOT diameter 2.24 cm    LVOT area 3.9 cm2    LVOT peak russel 1.06 m/s    LVOT peak VTI 20.56 cm    Ao peak russel 1.92 m/s    Ao VTI 34.77 cm    LVOT stroke volume 80.98 cm3    AV peak gradient 15 mmHg    E/E' ratio 9.09 m/s    MV Peak E Russel 0.50 m/s    MV stenosis pressure 1/2 time 66.21 ms    MV Peak A Russel 0.80 m/s    PV Peak S Russel 0.51 m/s    PV Peak D Russel 0.45 m/s    LV Systolic Volume 182.67 mL    LV Systolic Volume Index 85.8 mL/m2    LV Diastolic Volume 231.71 mL    LV Diastolic Volume Index 108.78 mL/m2    LA Volume Index 31.6 mL/m2    LV Mass Index 148 g/m2    RA Major Axis 4.23 cm    Left Atrium Minor Axis 4.94 cm    Left Atrium Major Axis 5.00 cm    LA Volume Index (Mod) 28.1 mL/m2    LA volume (mod) 59.85 cm3    RA Width 3.19 cm    BSA 2.21 m2    Right Atrial Pressure (from IVC) 3 mmHg    EF 15 %    Narrative    · The left ventricle is severely enlarged with eccentric hypertrophy and   severely decreased systolic function. The estimated ejection fraction is   15%.  · Mild right ventricular enlargement with normal right ventricular   systolic function.  · Grade I left ventricular diastolic dysfunction.  · Normal central venous pressure (3 mmHg).  · No evidence of significant valvular abnormalities or intracardiac   vegetation.        "

## 2022-04-06 NOTE — PROVIDER PROGRESS NOTES - EMERGENCY DEPT.
Encounter Date: 4/6/2022    ED Physician Progress Notes           ED Resident HAND-OFF NOTE:  7:44 AM 4/6/2022  Audrey Oneil Jr. is a 71 y.o. male who presented to the ED on 4/6/2022 and has been managed by , who reports patient C/O Shortness of breath. I assumed care of patient from off-going ED physician team at 7:44 AM pending  CT abdomen pelvis and lower extremity ultrasound.    On my evaluation, Audrey Oenil Jr. appears well, hemodynamically stable and in NAD. Thus far, Audrey Oneil Jr. has received:  Medications   furosemide injection 40 mg (40 mg Intravenous Given 4/6/22 0424)   magnesium sulfate 2g in water 50mL IVPB (premix) (2 g Intravenous New Bag 4/6/22 0670)   diphenhydrAMINE injection 25 mg (25 mg Intravenous Given 4/6/22 0946)   iohexoL (OMNIPAQUE 350) injection 100 mL (100 mLs Intravenous Given 4/6/22 0959)       Vital Signs:  BP (!) 151/70   Pulse 104   Temp 98.8 °F (37.1 °C) (Oral)   Resp 19   Wt 104.3 kg (230 lb)   SpO2 99%   BMI 36.02 kg/m²     Physical Exam:  No change compared to prior physical exam     MDM:  Cardiology consulted and saw patient.  Per Cardiology patient is not a candidate for heart failure service due to not being transplant candidate and declining LVAD.   Patient reports mild itching reaction after receiving contrast, 25 IV Benadryl given for pretreatment. Patient was cleared by Cardiology for admission to the floor as he does not have ICU indications at this time.  CT scan showing no acute change and any abdominopelvic process. Patient will be admitted to Hospital Medicine for treatment of his heart failure and will be followed along by Cardiology    ED Course as of 04/06/22 1052   Wed Apr 06, 2022   0511 WBC(!): 18.08 [NH]   0511 Hemoglobin(!): 12.4  At baseline [NH]   0513 Platelets: 426 [NH]   0514 X-Ray Chest AP Portable  Volume overload, cardiomegaly per my independent interpretation.     [DC]   0515 EKG 12-lead  Paced, tachycardic, no  significant change from previous per my independent interpretation.  . [DC]   0556 Chart review of imaging with CTA abd & pelvis showing rectus sheath hematoma, perinephric stranding, and possible early diverticulitis. With elevated WBC count and reported worsening abd distention/tightness over areas of ecchymosis, will get CT AP. [NH]   0638 BNP(!): 1,396  Baseline 200s-300s [DC]   0640 Respiratory status improved, will need re-evaluated after imaging. [NH]      ED Course User Index  [DC] Jaxson Schilling MD  [NH] Hiwot Hilario MD        Disposition:   Admission  I have discussed and counseled Audrey Oneil Jr. regarding exam, results, diagnosis, treatment, and plan.  ______________________  Elijah Kiran MD  Emergency Medicine Resident  7:44 AM 4/6/2022      Attending Note:  Agree with above.   CT with small left rectus sheath hematoma, decreased in size from prior.  Cardiology recommending  admission.  Dopplers pending at time of admission.  Weaned off BIPAP.   -A. Puja

## 2022-04-06 NOTE — CARE UPDATE
Brief Cardiology Consult Note    Patient seen and evaluated. Acute on chronic decompensated heart failure, on palliative dobutamine in setting of uncontrolled hypertension. Recently discharged off diuretic and GDMT given concern for hypotension and prerenal LAVERNE with plan for reinititaion as tolerated in cardiology clinic.    Would admit to medicine, assess response to IV Lasix 40, goal net negaticve 2-3L/day. Would resume Entresto, goal SBP<110. Full consult to follow.         Darius Ziegler MD  PGY-6, Cardiology  Pager [phone]

## 2022-04-06 NOTE — RESPIRATORY THERAPY
RAPID RESPONSE RESPIRATORY CHART CHECK       Chart check completed, instructed to call [phone] for further concerns or assistance.

## 2022-04-06 NOTE — HPI
72 y/o M hx ICM LVEF 15% on palli dobutamine and LBBB, s/p BiV-ICD, CAD, HTN, HLD presenting with acute on chronic CHF. Has followed with [facility], last with Dr. Henry in 2021. Patient declined LVAD, has since started palliative dobutamine. Patient was recently admitted to [facility] in setting of abdominal discomfort, found to have C diff colitis and UTI, currently completing course of PO vancomycin and cefpodoxime. Felt to be euvolemic at that time, continued on home PO diuretic. Course complicated by LAVERNE for which his entresto and diuretic were discontinued. Lowest BP on my review ~SBP 79. Was discharged off diuretic and his GDMT given concerns for hypotension with plan to resume upon follow up with cardiology if indicated. Now presents in setting of LE edema, orthopnea, 5 lb weight gain. WBC elevated but no fevers. CXR with pulmonary edema and possible RLL infiltrate. BP elevated to SBP 160s on my interview.  BNP elevated to 1400. Renal function now improved to Cr. 1.4. Denies chest pain, palpitations.

## 2022-04-06 NOTE — CONSULTS
[facility] - Med Surg  Cardiology  Consult Note    Patient Name: Audrey Oneil Jr.  MRN: [id number]  Admission Date: 4/6/2022  Hospital Length of Stay: 0 days  Code Status: Full Code   Attending Provider: Cholo Hudson MD   Consulting Provider: Darius Ziegler MD  Primary Care Physician: January Khan MD  Principal Problem:<principal problem not specified>    Patient information was obtained from patient, past medical records and ER records.     Inpatient consult to Cardiology  Consult performed by: Darius Ziegler MD  Consult ordered by: Elijah Kiran MD        Subjective:     Chief Complaint:  CHF exac     HPI:   70 y/o M hx ICM LVEF 15% on palli dobutamine and LBBB, s/p BiV-ICD, CAD, HTN, HLD presenting with acute on chronic CHF. Has followed with HTS, last with Dr. Henry in 2021. Patient declined LVAD, has since started palliative dobutamine. Patient was recently [place] to [facility] in setting of abdominal discomfort, found to have C diff colitis and UTI, currently completing course of PO vancomycin and cefpodoxime. Felt to be euvolemic at that time, continued on home PO diuretic. Course complicated by LAVERNE for which his entresto and diuretic were discontinued. Lowest BP on my review ~SBP 79. Was discharged off diuretic and his GDMT given concerns for hypotension with plan to resume upon follow up with cardiology if indicated. Now presents in setting of LE edema, orthopnea, 5 lb weight gain. WBC elevated but no fevers. CXR with pulmonary edema and possible RLL infiltrate. BP elevated to SBP 160s on my interview.  BNP elevated to 1400. Renal function now improved to Cr. 1.4. Denies chest pain, palpitations.      Past Medical History:   Diagnosis Date    Acute hypoxemic respiratory failure 11/7/2015    Acute idiopathic gout of left knee 12/2/2019    Acute idiopathic gout of right elbow 9/23/2021    AICD (automatic cardioverter/defibrillator) present 12/13/2015      Anticoagulant  long-term use     Bronchopneumonia 12/20/2021    CHF (congestive heart failure)     Chronic anticoagulation 5/5/2016    Chronic combined systolic and diastolic heart failure 11/26/2012    EF 10-20% on ECHO 2013    Chronic gout 12/2/2019    Clotting disorder     Coronary artery disease involving native coronary artery of native heart without angina pectoris 11/26/2012    Cath 10- Stents patent non-obstructive disease Cath 11-12015 non-obstructive disease     COVID-19 08/2021    Had antibody infusion    Diverticulosis of colon     DVT (deep venous thrombosis), unspecified laterality 11/12/2015    Dysphonia 1/28/2018    Essential hypertension 11/15/2015    Fine motor impairment 2/2/2021    Hyperlipidemia     Hypertensive heart disease with heart failure 5/5/2016    MI (myocardial infarction) 2009    x's 5    Nicotine abuse     Obesity 11/26/2012    Olecranon bursitis of right elbow 9/19/2021    Pulmonary embolism 2011    Renal disorder     LAVERNE    Right carpal tunnel syndrome 4/6/2018    Stented coronary artery 11/26/2012    LAD stent placed 10/17/2007     Trigger thumb of right hand 4/6/2018       Past Surgical History:   Procedure Laterality Date    APPENDECTOMY      BACK SURGERY      CARDIAC DEFIBRILLATOR PLACEMENT      CARDIAC SURGERY  2007    stent    CARPAL TUNNEL RELEASE Right 04/2018    INSERTION OF BIVENTRICULAR IMPLANTABLE CARDIOVERTER-DEFIBRILLATOR (ICD) N/A 5/3/2019    Procedure: INSERTION, ICD, BIVENTRICULAR;  Surgeon: Teofilo Pal MD;  Location: [facility] EP LAB;  Service: Cardiology;  Laterality: N/A;  ICH CM,  ICD UPGD BI-V,  SJM, MAC, FAS, 3PREP (dual ICD INSITU)    r knee scope      REVISION OF SKIN POCKET FOR CARDIOVERTER-DEFIBRILLATOR Left 5/3/2019    Procedure: Revision, Skin Pocket, For Cardioverter-Defibrillator;  Surgeon: Teofilo Pal MD;  Location: [facility] EP LAB;  Service: Cardiology;  Laterality: Left;    RIGHT HEART CATHETERIZATION Right  2021    Procedure: INSERTION, CATHETER, RIGHT HEART;  Surgeon: Lizz Nieto MD;  Location: [facility] CATH LAB;  Service: Cardiology;  Laterality: Right;    SPINE SURGERY      TONSILLECTOMY      TRIGGER FINGER RELEASE Right 2018    thumb       Review of patient's allergies indicates:   Allergen Reactions    Iodine containing multivitamin Swelling     itching    Keflex [cephalexin] Swelling     Eyes.  Tolerated multiple doses of zosyn and 1 dose of augmentin in  and 2016, respectively    Peaches [peach (prunus persica)] Swelling     eyes    Shellfish containing products Swelling    Fig tree Swelling     itching    Tuberculin spenser test ppd Rash       No current facility-administered medications on file prior to encounter.     Current Outpatient Medications on File Prior to Encounter   Medication Sig    allopurinoL (ZYLOPRIM) 100 MG tablet Take 2 tablets (200 mg total) by mouth once daily.    amiodarone (PACERONE) 200 MG Tab TAKE 1 AND 1/2 TABLETS(300 MG) BY MOUTH EVERY DAY    aspirin (ECOTRIN) 81 MG EC tablet Take 1 tablet (81 mg total) by mouth once daily.    atorvastatin (LIPITOR) 80 MG tablet Take 1 tablet (80 mg total) by mouth once daily.    [] cefpodoxime (VANTIN) 200 MG tablet Take 1 tablet (200 mg total) by mouth every 12 (twelve) hours. for 4 days    diphenhydrAMINE (BENADRYL) 25 mg capsule Take 25 mg by mouth as needed for Allergies.    DOBUTamine (DOBUTREX) 500 mg/250 mL (2,000 mcg/mL) 2.5 mcg/kg/min  Patient is 95.7 kg    furosemide (LASIX) 40 MG tablet Take 1 tablet (40 mg total) by mouth once daily. HOLD UNTIL TOLD TO RESUME BY PHYSICIAN    metoprolol succinate (TOPROL-XL) 25 MG 24 hr tablet Take 1 tablet (25 mg total) by mouth once daily.    midodrine (PROAMATINE) 2.5 MG Tab Take 1 tablet (2.5 mg total) by mouth 3 (three) times daily. HOLD until follow up with cardiology    ondansetron (ZOFRAN-ODT) 4 MG TbDL Take 1 tablet (4 mg total) by mouth every 6 (six) hours as  needed (nausea).    oxyCODONE-acetaminophen (PERCOCET) 5-325 mg per tablet Take 1 tablet by mouth every 6 (six) hours as needed for Pain.    paricalcitoL (ZEMPLAR) 1 MCG capsule Take 1 capsule (1 mcg total) by mouth once daily.    predniSONE (DELTASONE) 20 MG tablet Take 2 tablets (40 mg total) by mouth once daily. for 5 days    sacubitriL-valsartan (ENTRESTO) 24-26 mg per tablet Take 1 tablet by mouth 2 (two) times daily. HOLD UNTIL RESUMED BY CARDIOLOGIST    simethicone (MYLICON) 80 MG chewable tablet Take 1 tablet (80 mg total) by mouth every 6 (six) hours as needed for Flatulence.    vancomycin (VANCOCIN) 125 MG capsule Take 1 capsule (125 mg total) by mouth 4 (four) times daily. for 14 days    [DISCONTINUED] clopidogreL (PLAVIX) 75 mg tablet Take 1 tablet (75 mg total) by mouth once daily. (Patient not taking: Reported on 3/18/2022)    [DISCONTINUED] colchicine (COLCRYS) 0.6 mg tablet Take 1 tablet (0.6 mg total) by mouth once daily. (Patient not taking: Reported on 3/18/2022)    [DISCONTINUED] ipratropium (ATROVENT) 0.02 % nebulizer solution Take 2.5 mLs (500 mcg total) by nebulization 4 (four) times daily as needed for Wheezing. Rescue (Patient not taking: Reported on 3/29/2022)     Family History       Problem Relation (Age of Onset)    Cancer Mother, Paternal Grandmother    Colon polyps Father    Diabetes Mother    Heart disease Mother, Father    No Known Problems Sister, Daughter, Son, Sister, Son    Stroke Father          Tobacco Use    Smoking status: Former Smoker     Packs/day: 1.00     Years: 45.00     Pack years: 45.00     Types: Cigarettes     Quit date: 2005     Years since quittin.3    Smokeless tobacco: Never Used    Tobacco comment: 1-1.5 ppd every day.   Substance and Sexual Activity    Alcohol use: No    Drug use: No    Sexual activity: Never     Review of Systems   Constitutional: Positive for weight gain. Negative for chills and fever.   HENT: Negative.     Eyes:  Negative.    Cardiovascular:  Positive for leg swelling and orthopnea. Negative for chest pain, claudication and paroxysmal nocturnal dyspnea.   Respiratory:  Positive for shortness of breath. Negative for cough and wheezing.    Endocrine: Negative.    Hematologic/Lymphatic: Does not bruise/bleed easily.   Skin:  Negative for nail changes and rash.   Musculoskeletal: Negative.  Negative for back pain.   Gastrointestinal:  Positive for bloating and abdominal pain. Negative for melena, nausea and vomiting.   Neurological:  Negative for dizziness and headaches.   Psychiatric/Behavioral:  Negative for altered mental status, depression and substance abuse.    Allergic/Immunologic: Negative.    Objective:     Vital Signs (Most Recent):  Temp: 97.4 °F (36.3 °C) (04/06/22 1343)  Pulse: 102 (04/06/22 1343)  Resp: 18 (04/06/22 1248)  BP: 125/68 (04/06/22 1343)  SpO2: (!) 94 % (04/06/22 1343)   Vital Signs (24h Range):  Temp:  [97.4 °F (36.3 °C)-98.8 °F (37.1 °C)] 97.4 °F (36.3 °C)  Pulse:  [] 102  Resp:  [17-49] 18  SpO2:  [94 %-100 %] 94 %  BP: (124-161)/(60-98) 125/68     Weight: 104.3 kg (230 lb)  Body mass index is 36.02 kg/m².    SpO2: (!) 94 %  O2 Device (Oxygen Therapy): nasal cannula      Intake/Output Summary (Last 24 hours) at 4/6/2022 1359  Last data filed at 4/6/2022 0657  Gross per 24 hour   Intake --   Output 1050 ml   Net -1050 ml       Lines/Drains/Airways       Peripherally Inserted Central Catheter Line  Duration             PICC Double Lumen right basilic -- days              Drain  Duration             Male External Urinary Catheter 04/06/22 0452 <1 day                    Physical Exam  Constitutional:       Appearance: He is well-developed.   HENT:      Head: Normocephalic and atraumatic.   Eyes:      Conjunctiva/sclera: Conjunctivae normal.      Pupils: Pupils are equal, round, and reactive to light.   Neck:      Vascular: No JVD.   Cardiovascular:      Rate and Rhythm: Regular rhythm. Tachycardia  present.      Pulses: Intact distal pulses.      Heart sounds: Normal heart sounds. No murmur heard.    No friction rub. No gallop.   Pulmonary:      Effort: Pulmonary effort is normal.      Breath sounds: No stridor. No wheezing or rales.   Chest:      Chest wall: No tenderness.   Abdominal:      General: Bowel sounds are normal. There is distension.      Palpations: Abdomen is soft. There is no mass.      Tenderness: There is no abdominal tenderness. There is no guarding.   Musculoskeletal:         General: No tenderness or deformity.   Skin:     General: Skin is warm and dry.      Findings: No erythema or rash.   Neurological:      Mental Status: He is alert and oriented to person, place, and time.       Significant Labs: CMP   Recent Labs   Lab 04/06/22  0441      K 4.4   *   CO2 18*   GLU 94   BUN 35*   CREATININE 1.2   CALCIUM 9.3   PROT 6.8   ALBUMIN 3.2*   BILITOT 0.3   ALKPHOS 81   AST 22   ALT 17   ANIONGAP 12   ESTGFRAFRICA >60.0   EGFRNONAA >60.0    and CBC   Recent Labs   Lab 04/06/22  0441   WBC 18.08*   HGB 12.4*   HCT 39.6*          Significant Imaging: Echocardiogram: Transthoracic echo (TTE) complete (Cupid Only):   Results for orders placed or performed during the hospital encounter of 09/18/21   Echo   Result Value Ref Range    TDI SEPTAL 0.04 m/s    LV LATERAL E/E' RATIO 7.14 m/s    LV SEPTAL E/E' RATIO 12.50 m/s    LA WIDTH 3.92 cm    TDI LATERAL 0.07 m/s    LVIDd 8.00 (A) 3.5 - 6.0 cm    IVS 0.81 0.6 - 1.1 cm    Posterior Wall 0.81 0.6 - 1.1 cm    LVIDs 7.70 (A) 2.1 - 4.0 cm    FS 4 28 - 44 %    LA volume 67.23 cm3    Sinus 3.25 cm    STJ 2.89 cm    Ascending aorta 3.34 cm    LV mass 315.33 g    LA size 4.06 cm    RVDD 4.16 cm    TAPSE 2.09 cm    RV S' 16.04 cm/s    Left Ventricle Relative Wall Thickness 0.20 cm    AV mean gradient 8 mmHg    AV valve area 2.33 cm2    AV Velocity Ratio 0.55     AV index (prosthetic) 0.59     MV valve area p 1/2 method 3.32 cm2    E/A ratio  "0.63     Mean e' 0.06 m/s    E wave deceleration time 228.30 msec    MV "A" wave duration 9.71 msec    Pulm vein S/D ratio 1.13     LVOT diameter 2.24 cm    LVOT area 3.9 cm2    LVOT peak russel 1.06 m/s    LVOT peak VTI 20.56 cm    Ao peak russel 1.92 m/s    Ao VTI 34.77 cm    LVOT stroke volume 80.98 cm3    AV peak gradient 15 mmHg    E/E' ratio 9.09 m/s    MV Peak E Russel 0.50 m/s    MV stenosis pressure 1/2 time 66.21 ms    MV Peak A Russel 0.80 m/s    PV Peak S Russel 0.51 m/s    PV Peak D Russel 0.45 m/s    LV Systolic Volume 182.67 mL    LV Systolic Volume Index 85.8 mL/m2    LV Diastolic Volume 231.71 mL    LV Diastolic Volume Index 108.78 mL/m2    LA Volume Index 31.6 mL/m2    LV Mass Index 148 g/m2    RA Major Axis 4.23 cm    Left Atrium Minor Axis 4.94 cm    Left Atrium Major Axis 5.00 cm    LA Volume Index (Mod) 28.1 mL/m2    LA volume (mod) 59.85 cm3    RA Width 3.19 cm    BSA 2.21 m2    Right Atrial Pressure (from IVC) 3 mmHg    EF 15 %    Narrative    · The left ventricle is severely enlarged with eccentric hypertrophy and   severely decreased systolic function. The estimated ejection fraction is   15%.  · Mild right ventricular enlargement with normal right ventricular   systolic function.  · Grade I left ventricular diastolic dysfunction.  · Normal central venous pressure (3 mmHg).  · No evidence of significant valvular abnormalities or intracardiac   vegetation.        Assessment and Plan:     Acute on chronic combined systolic and diastolic heart failure  72 y/o M hx ICM LVEF 15% on palli dobutamine and LBBB, s/p BiV-ICD, CAD, HTN, HLD presenting with acute on chronic CHF.     Suspect decompensated heart failure in setting of recent discontinuation of GDMT, diuretic given concern for LAVERNE. Suspect LAVERNE may have been related to sepsis, UTI and that patient has been volume overloaded for several days with HTN exacerbating current episode.    - continue home dobutamine (hold all beta blockers)  - start entresto 24-26 " bid  - continue IV lasix 40mg bid (good response after first dose, goal net negative 2-3L daily)  - Hold off on spironolactone for now given recent LAVERNE  - Would work up for possible pneumonia and other potential causes of sepsis as well given shortness of breath, WBC and possible infiltrate on CXR despite antibiotic therapy for UTI/C diff    Coronary artery disease involving native coronary artery of native heart without angina pectoris  - resume antiplatelet agent (aspirin 81mg). He has been alternated between clopidogrel and aspirin but no indication for DAPT  - continue home high intensity statin    Ventricular tachycardia  - s/p BIV-ICD  - would resume home amiodarone        VTE Risk Mitigation (From admission, onward)         Ordered     Place sequential compression device  Until discontinued         04/06/22 1110     IP VTE HIGH RISK PATIENT  Once         04/06/22 1110                Thank you for your consult. I will follow-up with patient. Please contact us if you have any additional questions.    Darius Ziegler MD  Cardiology   [facility] - Med Surg

## 2022-04-06 NOTE — ASSESSMENT & PLAN NOTE
- resume antiplatelet agent (aspirin 81mg). He has been alternated between clopidogrel and aspirin but no indication for DAPT  - continue home high intensity statin

## 2022-04-06 NOTE — PLAN OF CARE
Problem: Adult Inpatient Plan of Care  Goal: Plan of Care Review  Outcome: Met  Goal: Patient-Specific Goal (Individualized)  Outcome: Met  Goal: Absence of Hospital-Acquired Illness or Injury  Outcome: Met  Goal: Optimal Comfort and Wellbeing  Outcome: Met  Goal: Readiness for Transition of Care  Outcome: Met     Problem: Adjustment to Illness (Sepsis/Septic Shock)  Goal: Optimal Coping  Outcome: Met     Problem: Bleeding (Sepsis/Septic Shock)  Goal: Absence of Bleeding  Outcome: Met     Patient was admitted to [facility] from the E.R. on dobutamine,And this is why the patient is being transferred to the step down unit report have been given, to the receiving nurse is aware of the medication and that the patient has C-Diff for contact precaution.

## 2022-04-07 NOTE — PROGRESS NOTES
[facility] - Cardiology [facility] Medicine  Progress Note    Patient Name: Audrey Oneil Jr.  MRN: [id number]  Patient Class: IP- Inpatient   Admission Date: 4/6/2022  Length of Stay: 1 days  Attending Physician: Grant Zamorano MD  Primary Care Provider: January Khan MD        Subjective:     Principal Problem:Acute on chronic combined systolic and diastolic heart failure        HPI:  Mr. Oneli is a 71 M with PMHx of CAD, HFrEF (10-20%) on chronic dobutamine gtt, s/p AICD, h/o PE, HTN, HLD who presents to [facility] ED with 1 day dyspnea.  Patient was recently discharged from [facility] with pyelonephritis, gout flare, and C diff infection.  After discharge, patient was holding home diuretics per instructions.  On night prior to admission, patient began to feel short of breath with ambulation intent to take home diuretic without improvement.  Patient continued to feel short of breath in the morning and presented to the ED for further evaluation.  Patient denies PND, orthopnea, chest pain, lightheadedness, dizziness, nausea, abdominal pain, fevers, chills, and constipation.  Continues to endorse loose stools while being treated for C diff.    In the ED, patient HDS. Labs notable for BNP 1396, WBC 18, and creatinine 1.2.  CT A/P with no significant findings.  Chest x-ray with bilateral edema, unchanged from prior study.  BLE DVT ultrasound negative.  Cardiology consult in the ED.  Patient given 40 mg IV Lasix.      Overview/Hospital Course:  No notes on file    Interval History: Patient lying in bed, no acute distress. No acute events overnight. Patient reports improvement in dyspnea and BLE swelling while on diuresis. Patient also notes good UOP, regular bowel movements and good po intake. Denies fever, chills, chest pain, SOB, cough, N/V, abdominal pain, changes in bowel or bladder function, signs of bleeding, rashes, wounds or swelling. Tolerating dobutamine gtt, entresto and IV lasix.       Review of Systems    Constitutional:  Positive for activity change, fatigue and unexpected weight change. Negative for chills and fever.   HENT:  Negative for congestion.    Eyes:  Negative for visual disturbance.   Respiratory:  Positive for shortness of breath. Negative for cough.    Cardiovascular:  Positive for leg swelling. Negative for chest pain.   Gastrointestinal:  Negative for abdominal distention, abdominal pain, nausea and vomiting.   Genitourinary:  Negative for dysuria.   Musculoskeletal:  Negative for back pain.   Skin:  Negative for rash and wound.   Neurological:  Positive for weakness. Negative for dizziness.   Psychiatric/Behavioral:  Negative for confusion.    Objective:     Vital Signs (Most Recent):  Temp: 98.5 °F (36.9 °C) (04/07/22 1554)  Pulse: 92 (04/07/22 1554)  Resp: 20 (04/07/22 1554)  BP: 121/61 (04/07/22 1554)  SpO2: 96 % (04/07/22 1554) Vital Signs (24h Range):  Temp:  [98.5 °F (36.9 °C)-99 °F (37.2 °C)] 98.5 °F (36.9 °C)  Pulse:  [] 92  Resp:  [16-20] 20  SpO2:  [94 %-97 %] 96 %  BP: (114-151)/(60-73) 121/61     Weight: 102 kg (224 lb 13.9 oz)  Body mass index is 35.22 kg/m².    Intake/Output Summary (Last 24 hours) at 4/7/2022 1742  Last data filed at 4/7/2022 1600  Gross per 24 hour   Intake 400 ml   Output 1750 ml   Net -1350 ml      Physical Exam  Constitutional:       Appearance: Normal appearance.   HENT:      Head: Normocephalic.      Mouth/Throat:      Mouth: Mucous membranes are moist.   Eyes:      Extraocular Movements: Extraocular movements intact.      Pupils: Pupils are equal, round, and reactive to light.   Neck:      Comments: JVD absent  Cardiovascular:      Rate and Rhythm: Normal rate and regular rhythm.      Pulses: Normal pulses.      Heart sounds: Normal heart sounds.   Pulmonary:      Effort: Pulmonary effort is normal.      Breath sounds: Examination of the right-lower field reveals decreased breath sounds. Examination of the left-lower field reveals decreased breath  sounds. Decreased breath sounds present.   Abdominal:      General: Bowel sounds are normal. There is no distension.      Palpations: Abdomen is soft.      Tenderness: There is no abdominal tenderness.   Musculoskeletal:         General: Normal range of motion.      Cervical back: Neck supple.      Right lower leg: Edema (trace) present.      Left lower leg: Edema (trace) present.   Neurological:      General: No focal deficit present.      Mental Status: He is alert and oriented to person, place, and time.   Psychiatric:         Mood and Affect: Mood normal.       Significant Labs: All pertinent labs within the past 24 hours have been reviewed.    Significant Imaging: I have reviewed all pertinent imaging results/findings within the past 24 hours.      Assessment/Plan:      * Acute on chronic combined systolic and diastolic heart failure  Patient presenting with dyspnea setting of holding diuresis from previous hospital admission.  - BMP 3100  - Home: Lasix 40mg  - s/p Lasix 40mg in the ED  - Holding metoprolol, spironolactone  - Cardiology consulted  - Continue home Dobutamine 2.5  - Continue Entresto   - Lasix 40mg IV BID  - Daily standing weight   - Strict input/output  - Daily BMP  - Mg>2, K>4  - Telemetry      Leukocytosis  - Patient with current C diff treatment and recently completed steroids for gout flare.   - WBC 18 on admission. Afebrile.  - Monitor leukocytosis      Clostridium difficile infection  - Continue p.o. vancomycin  - End of treatment 14 days after antibiotics for urinary infection, 4/16      Gout  - Continue home allopurinol      Mixed hyperlipidemia  - Continue home atorvastatin, aspirin      Ventricular tachycardia  - Continue home amiodarone    Coronary artery disease involving native coronary artery of native heart without angina pectoris  Continue aspirin 81      VTE Risk Mitigation (From admission, onward)         Ordered     Place sequential compression device  Until discontinued          04/06/22 1110     IP VTE HIGH RISK PATIENT  Once         04/06/22 1110                Discharge Planning   MADHAVI: 4/8/2022     Code Status: Full Code   Is the patient medically ready for discharge?: No    Reason for patient still in hospital (select all that apply): Patient unstable, Patient trending condition, Laboratory test and Treatment  Discharge Plan A: Home Health, Home with family            Time spent in care of patient: > 35 minutes         Grant Zamorano MD  Department of Hospital Medicine   [facility] - Cardiology Stepdown

## 2022-04-07 NOTE — PROGRESS NOTES
[facility] - [facility]iology Stepdown  Cardiology  Progress Note    Patient Name: Audrey Oneil Jr.  MRN: [id number]  Admission Date: 4/6/2022  Hospital Length of Stay: 1 days  Code Status: Full Code   Attending Physician: Grant Zamorano MD   Primary Care Physician: January Khan MD  Expected Discharge Date: 4/8/2022  Principal Problem:Acute on chronic combined systolic and diastolic heart failure    Subjective:     Hospital Course:   No notes on file    Interval History: NAEON. Received lasix 40mg IV yesterday morning in ED but did not receive lasix last night. Had good response yesterday morning. He subjectively feels his swelling is much better.    ROS  Objective:     Vital Signs (Most Recent):  Temp: 99 °F (37.2 °C) (04/07/22 0717)  Pulse: 102 (04/07/22 0727)  Resp: 18 (04/07/22 0717)  BP: 138/73 (04/07/22 0717)  SpO2: 97 % (04/07/22 0717) Vital Signs (24h Range):  Temp:  [97.4 °F (36.3 °C)-99 °F (37.2 °C)] 99 °F (37.2 °C)  Pulse:  [] 102  Resp:  [18-20] 18  SpO2:  [94 %-99 %] 97 %  BP: (124-161)/(60-74) 138/73     Weight: 102 kg (224 lb 13.9 oz)  Body mass index is 35.22 kg/m².     SpO2: 97 %  O2 Device (Oxygen Therapy): nasal cannula      Intake/Output Summary (Last 24 hours) at 4/7/2022 0912  Last data filed at 4/7/2022 0500  Gross per 24 hour   Intake 400 ml   Output 550 ml   Net -150 ml       Lines/Drains/Airways       Peripherally Inserted Central Catheter Line  Duration             PICC Double Lumen right basilic -- days              Drain  Duration             Male External Urinary Catheter 04/06/22 0452 1 day                    Physical Exam  Vitals and nursing note reviewed.   Constitutional:       General: He is not in acute distress.     Appearance: He is not toxic-appearing or diaphoretic.   HENT:      Head: Normocephalic.   Cardiovascular:      Rate and Rhythm: Normal rate and regular rhythm.      Heart sounds: Murmur (soft systolic murmur present) heard.   Pulmonary:      Effort: Pulmonary effort  is normal. No respiratory distress.      Breath sounds: Rales (L lung rales) present.   Abdominal:      Palpations: Abdomen is soft.      Tenderness: There is no guarding.   Musculoskeletal:      Right lower leg: Edema (R>L) present.      Left lower leg: Edema present.   Skin:     General: Skin is warm and dry.   Neurological:      Mental Status: He is alert. Mental status is at baseline.      Cranial Nerves: No dysarthria.   Psychiatric:         Mood and Affect: Mood normal.         Behavior: Behavior normal.       Significant Labs: All pertinent lab results from the last 24 hours have been reviewed.    Significant Imaging:  na    Assessment and Plan:         * Acute on chronic combined systolic and diastolic heart failure  70 y/o M hx ICM LVEF 15% on palli dobutamine and LBBB, s/p BiV-ICD, CAD, HTN, HLD presenting with acute on chronic CHF.     Suspect decompensated heart failure in setting of recent discontinuation of GDMT, diuretic given concern for LAVERNE. Suspect LAVERNE may have been related to sepsis, UTI and that patient has been volume overloaded for several days with HTN exacerbating current episode.    - continue home dobutamine (hold all beta blockers)  - continue entresto 24-26 bid  - continue IV lasix 40mg bid (goal net negative 2-3L daily)  - Hold off on spironolactone for now given recent LAVERNE  - recommend initiation of SGLT2 inhibitor at discharge for mortality benefit    Ventricular tachycardia  - s/p BIV-ICD  - increase home amiodarone to 400mg daily (appropriate dose)    Coronary artery disease involving native coronary artery of native heart without angina pectoris  - resume antiplatelet agent (aspirin 81mg). He has been alternated between clopidogrel and aspirin but no indication for DAPT  - continue home high intensity statin        VTE Risk Mitigation (From admission, onward)         Ordered     Place sequential compression device  Until discontinued         04/06/22 1110     IP VTE HIGH RISK  PATIENT  Once         04/06/22 1110                Tracy Avila MD  Cardiology  [facility] - [facility] Step[facility]

## 2022-04-07 NOTE — ASSESSMENT & PLAN NOTE
- Continue p.o. vancomycin  - End of treatment 14 days after antibiotics for urinary infection, 4/16

## 2022-04-07 NOTE — PLAN OF CARE
[facility] - [facility] Step[facility]  Initial Discharge Assessment       Primary Care Provider: January Khan MD    Admission Diagnosis: Shortness of breath [R06.02]  SOB (shortness of breath) [R06.02]  Leg swelling [M79.89]  Acute on chronic heart failure with reduced ejection fraction and diastolic dysfunction [I50.43]    Admission Date: 4/6/2022  Expected Discharge Date: 4/8/2022    Discharge Barriers Identified: None    Payor: [employer]GED MEDICARE / Plan: [email or web address] / Product Type: Medicare Advantage /     Extended Emergency Contact Information  Primary Emergency Contact: AimeeRiana  Address: [address]           [place] [address] [facility]  Mobile Phone: [phone]  Relation: Sister  Preferred language: English  Secondary Emergency Contact: Silvina Saucedonda  Mobile Phone: [phone]  Relation: Other    Discharge Plan A: Home Health, Home with family  Discharge Plan B: Home with family      [id number] [place]  [address]  AT [facility] [address]  [address] [place] [address]  Phone: [phone] Fax: [phone]      Initial Assessment (most recent)     Adult Discharge Assessment - 04/07/22 1603        Discharge Assessment    Assessment Type Discharge Planning Assessment     Confirmed/corrected address, phone number and insurance Yes     Confirmed Demographics Correct on Facesheet     Source of Information patient     Communicated MADHAVI with patient/caregiver Yes     Lives With sibling(s)     Do you expect to return to your current living situation? Yes     Do you have help at home or someone to help you manage your care at home? Yes     Who are your caregiver(s) and their phone number(s)? Riana (sister) [phone]     Prior to hospitilization cognitive status: Alert/Oriented     Current cognitive status: Alert/Oriented     Walking or Climbing Stairs Difficulty ambulation difficulty, requires equipment      Mobility Management Rolling walker     Equipment Currently Used at Home medication pump;oxygen;walker, rolling;bedside commode;hospital bed     Readmission within 30 days? No     Patient currently being followed by outpatient case management? No     Do you currently have service(s) that help you manage your care at home? Yes     Name and Contact number of agency Not sure of HH agency name, [facility]      Is the pt/caregiver preference to resume services with current agency Yes     Do you take prescription medications? Yes     Do you have prescription coverage? Yes     Coverage PHN     Do you have any problems affording any of your prescribed medications? TBD     Who is going to help you get home at discharge? Sister     How do you get to doctors appointments? family or friend will provide     Are you on dialysis? No     Do you take coumadin? No     Discharge Plan A Home Health;Home with family     Discharge Plan B Home with family     DME Needed Upon Discharge  none     Discharge Plan discussed with: Patient     Discharge Barriers Identified None                    Pt admitted 4/6/2022  3:52 AM    For Shortness of breath [R06.02]  SOB (shortness of breath) [R06.02]  Leg swelling [M79.89]  Acute on chronic heart failure with reduced ejection fraction and diastolic dysfunction [I50.43]       CM to bedside for assessment. Information obtained from patient.  Pt lives with sister in house. Pt has 4 steps to enter home. Sister will bring patient home at discharge. Will have support from family at d/c. Anticipate d/c to home with home health and home infusion needs.    Pt is followed by Dr Gama for EP and Dr Cintron for cardiology at [facility].    PCP is January Khan MD  [phone] (p)  [phone] (f)      Payor: [employer] MANAGED MEDICARE / Plan: [employer] HEALTH / Product Type: Medicare Advantage /       [id number] - [place] - [address]  AT [facility]  [address]  [address] [place] [address]  Phone: [phone] Fax: [phone]      Extended Emergency Contact Information  Primary Emergency Contact: Riana Yu  Address: [address]           [place] [address] [facility]  Mobile Phone: [phone]  Relation: Sister  Preferred language: English  Secondary Emergency Contact: Pam Saucedo  Mobile Phone: [phone]  Relation: Other    Future Appointments   Date Time Provider Department Center   4/13/2022 10:30 AM LAB, APPOINTMENT [place] [facility]   4/13/2022 11:30 AM Migue Henry Jr., MD [facility]   5/9/2022 10:00 AM HOME MONITOR DEVICE CHECK, [facility] Julie Haase RN  Case Management [phone]

## 2022-04-07 NOTE — SUBJECTIVE & OBJECTIVE
Interval History: Patient lying in bed, no acute distress. No acute events overnight. Patient reports improvement in dyspnea and BLE swelling while on diuresis. Patient also notes good UOP, regular bowel movements and good po intake. Denies fever, chills, chest pain, SOB, cough, N/V, abdominal pain, changes in bowel or bladder function, signs of bleeding, rashes, wounds or swelling. Tolerating dobutamine gtt, entresto and IV lasix.       Review of Systems   Constitutional:  Positive for activity change, fatigue and unexpected weight change. Negative for chills and fever.   HENT:  Negative for congestion.    Eyes:  Negative for visual disturbance.   Respiratory:  Positive for shortness of breath. Negative for cough.    Cardiovascular:  Positive for leg swelling. Negative for chest pain.   Gastrointestinal:  Negative for abdominal distention, abdominal pain, nausea and vomiting.   Genitourinary:  Negative for dysuria.   Musculoskeletal:  Negative for back pain.   Skin:  Negative for rash and wound.   Neurological:  Positive for weakness. Negative for dizziness.   Psychiatric/Behavioral:  Negative for confusion.    Objective:     Vital Signs (Most Recent):  Temp: 98.5 °F (36.9 °C) (04/07/22 1554)  Pulse: 92 (04/07/22 1554)  Resp: 20 (04/07/22 1554)  BP: 121/61 (04/07/22 1554)  SpO2: 96 % (04/07/22 1554) Vital Signs (24h Range):  Temp:  [98.5 °F (36.9 °C)-99 °F (37.2 °C)] 98.5 °F (36.9 °C)  Pulse:  [] 92  Resp:  [16-20] 20  SpO2:  [94 %-97 %] 96 %  BP: (114-151)/(60-73) 121/61     Weight: 102 kg (224 lb 13.9 oz)  Body mass index is 35.22 kg/m².    Intake/Output Summary (Last 24 hours) at 4/7/2022 1742  Last data filed at 4/7/2022 1600  Gross per 24 hour   Intake 400 ml   Output 1750 ml   Net -1350 ml      Physical Exam  Constitutional:       Appearance: Normal appearance.   HENT:      Head: Normocephalic.      Mouth/Throat:      Mouth: Mucous membranes are moist.   Eyes:      Extraocular Movements: Extraocular  movements intact.      Pupils: Pupils are equal, round, and reactive to light.   Neck:      Comments: JVD absent  Cardiovascular:      Rate and Rhythm: Normal rate and regular rhythm.      Pulses: Normal pulses.      Heart sounds: Normal heart sounds.   Pulmonary:      Effort: Pulmonary effort is normal.      Breath sounds: Examination of the right-lower field reveals decreased breath sounds. Examination of the left-lower field reveals decreased breath sounds. Decreased breath sounds present.   Abdominal:      General: Bowel sounds are normal. There is no distension.      Palpations: Abdomen is soft.      Tenderness: There is no abdominal tenderness.   Musculoskeletal:         General: Normal range of motion.      Cervical back: Neck supple.      Right lower leg: Edema (trace) present.      Left lower leg: Edema (trace) present.   Neurological:      General: No focal deficit present.      Mental Status: He is alert and oriented to person, place, and time.   Psychiatric:         Mood and Affect: Mood normal.       Significant Labs: All pertinent labs within the past 24 hours have been reviewed.    Significant Imaging: I have reviewed all pertinent imaging results/findings within the past 24 hours.

## 2022-04-07 NOTE — SUBJECTIVE & OBJECTIVE
Past Medical History:   Diagnosis Date    Acute hypoxemic respiratory failure 11/7/2015    Acute idiopathic gout of left knee 12/2/2019    Acute idiopathic gout of right elbow 9/23/2021    AICD (automatic cardioverter/defibrillator) present 12/13/2015      Anticoagulant long-term use     Bronchopneumonia 12/20/2021    CHF (congestive heart failure)     Chronic anticoagulation 5/5/2016    Chronic combined systolic and diastolic heart failure 11/26/2012    EF 10-20% on ECHO 2013    Chronic gout 12/2/2019    Clotting disorder     Coronary artery disease involving native coronary artery of native heart without angina pectoris 11/26/2012    Cath 10- Stents patent non-obstructive disease Cath 11-12015 non-obstructive disease     COVID-19 08/2021    Had antibody infusion    Diverticulosis of colon     DVT (deep venous thrombosis), unspecified laterality 11/12/2015    Dysphonia 1/28/2018    Essential hypertension 11/15/2015    Fine motor impairment 2/2/2021    Hyperlipidemia     Hypertensive heart disease with heart failure 5/5/2016    MI (myocardial infarction) 2009    x's 5    Nicotine abuse     Obesity 11/26/2012    Olecranon bursitis of right elbow 9/19/2021    Pulmonary embolism 2011    Renal disorder     LAVERNE    Right carpal tunnel syndrome 4/6/2018    Stented coronary artery 11/26/2012    LAD stent placed 10/17/2007     Trigger thumb of right hand 4/6/2018       Past Surgical History:   Procedure Laterality Date    APPENDECTOMY      BACK SURGERY      CARDIAC DEFIBRILLATOR PLACEMENT      CARDIAC SURGERY  2007    stent    CARPAL TUNNEL RELEASE Right 04/2018    INSERTION OF BIVENTRICULAR IMPLANTABLE CARDIOVERTER-DEFIBRILLATOR (ICD) N/A 5/3/2019    Procedure: INSERTION, ICD, BIVENTRICULAR;  Surgeon: Teofilo Pal MD;  [facility];  Service: Cardiology;  Laterality: N/A;  ICH CM,  ICD UPGD BI-V,  SJM, MAC, FAS, 3PREP (dual ICD INSITU)    r knee scope      REVISION OF SKIN POCKET FOR  CARDIOVERTER-DEFIBRILLATOR Left 5/3/2019    Procedure: Revision, Skin Pocket, For Cardioverter-Defibrillator;  Surgeon: Teofilo Pal MD;  Location: [facility] EP LAB;  Service: Cardiology;  Laterality: Left;    RIGHT HEART CATHETERIZATION Right 2021    Procedure: INSERTION, CATHETER, RIGHT HEART;  Surgeon: Lizz Nieto MD;  Location: [facility] CATH LAB;  Service: Cardiology;  Laterality: Right;    SPINE SURGERY      TONSILLECTOMY      TRIGGER FINGER RELEASE Right 2018    thumb       Review of patient's allergies indicates:   Allergen Reactions    Iodine containing multivitamin Swelling     itching    Keflex [cephalexin] Swelling     Eyes.  Tolerated multiple doses of zosyn and 1 dose of augmentin in  and 2016, respectively    Peaches [peach (prunus persica)] Swelling     eyes    Shellfish containing products Swelling    Fig tree Swelling     itching    Tuberculin spenser test ppd Rash       No current facility-administered medications on file prior to encounter.     Current Outpatient Medications on File Prior to Encounter   Medication Sig    allopurinoL (ZYLOPRIM) 100 MG tablet Take 2 tablets (200 mg total) by mouth once daily.    amiodarone (PACERONE) 200 MG Tab TAKE 1 AND 1/2 TABLETS(300 MG) BY MOUTH EVERY DAY    aspirin (ECOTRIN) 81 MG EC tablet Take 1 tablet (81 mg total) by mouth once daily.    atorvastatin (LIPITOR) 80 MG tablet Take 1 tablet (80 mg total) by mouth once daily.    [] cefpodoxime (VANTIN) 200 MG tablet Take 1 tablet (200 mg total) by mouth every 12 (twelve) hours. for 4 days    diphenhydrAMINE (BENADRYL) 25 mg capsule Take 25 mg by mouth as needed for Allergies.    DOBUTamine (DOBUTREX) 500 mg/250 mL (2,000 mcg/mL) 2.5 mcg/kg/min  Patient is 95.7 kg    furosemide (LASIX) 40 MG tablet Take 1 tablet (40 mg total) by mouth once daily. HOLD UNTIL TOLD TO RESUME BY PHYSICIAN    metoprolol succinate (TOPROL-XL) 25 MG 24 hr tablet Take 1 tablet (25 mg total) by mouth once daily.     midodrine (PROAMATINE) 2.5 MG Tab Take 1 tablet (2.5 mg total) by mouth 3 (three) times daily. HOLD until follow up with cardiology    ondansetron (ZOFRAN-ODT) 4 MG TbDL Take 1 tablet (4 mg total) by mouth every 6 (six) hours as needed (nausea).    oxyCODONE-acetaminophen (PERCOCET) 5-325 mg per tablet Take 1 tablet by mouth every 6 (six) hours as needed for Pain.    paricalcitoL (ZEMPLAR) 1 MCG capsule Take 1 capsule (1 mcg total) by mouth once daily.    predniSONE (DELTASONE) 20 MG tablet Take 2 tablets (40 mg total) by mouth once daily. for 5 days    sacubitriL-valsartan (ENTRESTO) 24-26 mg per tablet Take 1 tablet by mouth 2 (two) times daily. HOLD UNTIL RESUMED BY CARDIOLOGIST    simethicone (MYLICON) 80 MG chewable tablet Take 1 tablet (80 mg total) by mouth every 6 (six) hours as needed for Flatulence.    vancomycin (VANCOCIN) 125 MG capsule Take 1 capsule (125 mg total) by mouth 4 (four) times daily. for 14 days    [DISCONTINUED] clopidogreL (PLAVIX) 75 mg tablet Take 1 tablet (75 mg total) by mouth once daily. (Patient not taking: Reported on 3/18/2022)    [DISCONTINUED] colchicine (COLCRYS) 0.6 mg tablet Take 1 tablet (0.6 mg total) by mouth once daily. (Patient not taking: Reported on 3/18/2022)    [DISCONTINUED] ipratropium (ATROVENT) 0.02 % nebulizer solution Take 2.5 mLs (500 mcg total) by nebulization 4 (four) times daily as needed for Wheezing. Rescue (Patient not taking: Reported on 3/29/2022)     Family History       Problem Relation (Age of Onset)    Cancer Mother, Paternal Grandmother    Colon polyps Father    Diabetes Mother    Heart disease Mother, Father    No Known Problems Sister, Daughter, Son, Sister, Son    Stroke Father          Tobacco Use    Smoking status: Former Smoker     Packs/day: 1.00     Years: 45.00     Pack years: 45.00     Types: Cigarettes     Quit date: 2005     Years since quittin.3    Smokeless tobacco: Never Used    Tobacco comment: 1-1.5 ppd every day.    Substance and Sexual Activity    Alcohol use: No    Drug use: No    Sexual activity: Never     Review of Systems   Constitutional:  Negative for activity change, appetite change, chills, diaphoresis, fatigue and fever.   HENT:  Negative for rhinorrhea and sore throat.    Respiratory:  Positive for shortness of breath. Negative for cough and chest tightness.    Cardiovascular:  Negative for chest pain and palpitations.   Gastrointestinal:  Positive for diarrhea. Negative for abdominal distention, abdominal pain, constipation and nausea.   Endocrine: Negative for cold intolerance.   Genitourinary:  Negative for decreased urine volume and dysuria.   Musculoskeletal:  Negative for arthralgias and myalgias.   Skin:  Negative for rash and wound.   Neurological:  Negative for dizziness, weakness, numbness and headaches.   Psychiatric/Behavioral:  Negative for agitation, behavioral problems and confusion.    Objective:     Vital Signs (Most Recent):  Temp: 97.4 °F (36.3 °C) (04/06/22 1343)  Pulse: 102 (04/06/22 1343)  Resp: 18 (04/06/22 1248)  BP: 125/68 (04/06/22 1343)  SpO2: (!) 94 % (04/06/22 1343)   Vital Signs (24h Range):  Temp:  [97.4 °F (36.3 °C)-98.8 °F (37.1 °C)] 97.4 °F (36.3 °C)  Pulse:  [] 102  Resp:  [17-49] 18  SpO2:  [94 %-100 %] 94 %  BP: (124-161)/(60-98) 125/68     Weight: 104.3 kg (230 lb)  Body mass index is 36.02 kg/m².    Physical Exam  Constitutional:       Appearance: Normal appearance. He is normal weight.   HENT:      Head: Normocephalic and atraumatic.      Mouth/Throat:      Mouth: Mucous membranes are moist.   Eyes:      Extraocular Movements: Extraocular movements intact.      Conjunctiva/sclera: Conjunctivae normal.   Cardiovascular:      Rate and Rhythm: Regular rhythm. Tachycardia present.      Heart sounds: No murmur heard.  Pulmonary:      Effort: Pulmonary effort is normal. No respiratory distress.      Breath sounds: Normal breath sounds. No wheezing or rales.   Abdominal:       General: Abdomen is flat. There is no distension.      Palpations: Abdomen is soft.      Tenderness: There is no abdominal tenderness. There is no guarding.   Musculoskeletal:         General: No swelling or tenderness.      Right lower leg: No edema.      Left lower leg: Edema present.   Skin:     Findings: No rash.   Neurological:      General: No focal deficit present.      Mental Status: He is alert and oriented to person, place, and time. Mental status is at baseline.   Psychiatric:         Mood and Affect: Mood normal.         Behavior: Behavior normal.           Significant Labs: CBC:   Recent Labs   Lab 04/06/22  0441   WBC 18.08*   HGB 12.4*   HCT 39.6*        CMP:   Recent Labs   Lab 04/06/22  0441      K 4.4   *   CO2 18*   GLU 94   BUN 35*   CREATININE 1.2   CALCIUM 9.3   PROT 6.8   ALBUMIN 3.2*   BILITOT 0.3   ALKPHOS 81   AST 22   ALT 17   ANIONGAP 12   EGFRNONAA >60.0     Cardiac Markers:   Recent Labs   Lab 04/06/22 0441   BNP 1,396*       Significant Imaging: I have reviewed all pertinent imaging results/findings within the past 24 hours.

## 2022-04-07 NOTE — H&P
[facility] - Cardiology [facility]  History & Physical    Patient Name: Audrey Oneil Jr.  MRN: [id number]  Patient Class: IP- Inpatient  Admission Date: 4/6/2022  Attending Physician: Cholo Hudson MD   Primary Care Provider: January Khan MD         Patient information was obtained from patient, past medical records and ER records.     Subjective:     Principal Problem:<principal problem not specified>    Chief Complaint:   Chief Complaint   Patient presents with    Shortness of Breath     Sudden onset SOB. Home dobutamine pump. Hx stemi,pe, heart failure.        HPI: Mr. Oneil is a 71 M with PMHx of CAD, HFrEF (10-20%) on chronic dobutamine gtt, s/p AICD, h/o PE, HTN, HLD who presents to [facility] ED with 1 day dyspnea.  Patient was recently discharged from [facility] with pyelonephritis, gout flare, and C diff infection.  After discharge, patient was holding home diuretics per instructions.  On night prior to admission, patient began to feel short of breath with ambulation intent to take home diuretic without improvement.  Patient continued to feel short of breath in the morning and presented to the ED for further evaluation.  Patient denies PND, orthopnea, chest pain, lightheadedness, dizziness, nausea, abdominal pain, fevers, chills, and constipation.  Continues to endorse loose stools while being treated for C diff.    In the ED, patient HDS. Labs notable for BNP 1396, WBC 18, and creatinine 1.2.  CT A/P with no significant findings.  Chest x-ray with bilateral edema, unchanged from prior study.  BLE DVT ultrasound negative.  Cardiology consult in the ED.  Patient given 40 mg IV Lasix.      Past Medical History:   Diagnosis Date    Acute hypoxemic respiratory failure 11/7/2015    Acute idiopathic gout of left knee 12/2/2019    Acute idiopathic gout of right elbow 9/23/2021    AICD (automatic cardioverter/defibrillator) present 12/13/2015      Anticoagulant long-term use      Bronchopneumonia 12/20/2021    CHF (congestive heart failure)     Chronic anticoagulation 5/5/2016    Chronic combined systolic and diastolic heart failure 11/26/2012    EF 10-20% on ECHO 2013    Chronic gout 12/2/2019    Clotting disorder     Coronary artery disease involving native coronary artery of native heart without angina pectoris 11/26/2012    Cath 10- Stents patent non-obstructive disease Cath [id number] non-obstructive disease     COVID-19 08/2021    Had antibody infusion    Diverticulosis of colon     DVT (deep venous thrombosis), unspecified laterality 11/12/2015    Dysphonia 1/28/2018    Essential hypertension 11/15/2015    Fine motor impairment 2/2/2021    Hyperlipidemia     Hypertensive heart disease with heart failure 5/5/2016    MI (myocardial infarction) 2009    x's 5    Nicotine abuse     Obesity 11/26/2012    Olecranon bursitis of right elbow 9/19/2021    Pulmonary embolism 2011    Renal disorder     LAVERNE    Right carpal tunnel syndrome 4/6/2018    Stented coronary artery 11/26/2012    LAD stent placed 10/17/2007     Trigger thumb of right hand 4/6/2018       Past Surgical History:   Procedure Laterality Date    APPENDECTOMY      BACK SURGERY      CARDIAC DEFIBRILLATOR PLACEMENT      CARDIAC SURGERY  2007    stent    CARPAL TUNNEL RELEASE Right 04/2018    INSERTION OF BIVENTRICULAR IMPLANTABLE CARDIOVERTER-DEFIBRILLATOR (ICD) N/A 5/3/2019    Procedure: INSERTION, ICD, BIVENTRICULAR;  Surgeon: Teofilo Pal MD;  Location: [facility] EP LAB;  Service: Cardiology;  Laterality: N/A;  ICH CM,  ICD UPGD BI-V,  SJM, MAC, FAS, 3PREP (dual ICD INSITU)    r knee scope      REVISION OF SKIN POCKET FOR CARDIOVERTER-DEFIBRILLATOR Left 5/3/2019    Procedure: Revision, Skin Pocket, For Cardioverter-Defibrillator;  Surgeon: Teofilo Pal MD;  Location: [facility] EP LAB;  Service: Cardiology;  Laterality: Left;    RIGHT HEART CATHETERIZATION Right 6/14/2021    Procedure:  INSERTION, CATHETER, RIGHT HEART;  Surgeon: Lizz Nieto MD;  Location: [facility] CATH LAB;  Service: Cardiology;  Laterality: Right;    SPINE SURGERY      TONSILLECTOMY      TRIGGER FINGER RELEASE Right 2018    thumb       Review of patient's allergies indicates:   Allergen Reactions    Iodine containing multivitamin Swelling     itching    Keflex [cephalexin] Swelling     Eyes.  Tolerated multiple doses of zosyn and 1 dose of augmentin in 2015 and 2016, respectively    Peaches [peach (prunus persica)] Swelling     eyes    Shellfish containing products Swelling    Fig tree Swelling     itching    Tuberculin spenser test ppd Rash       No current facility-administered medications on file prior to encounter.     Current Outpatient Medications on File Prior to Encounter   Medication Sig    allopurinoL (ZYLOPRIM) 100 MG tablet Take 2 tablets (200 mg total) by mouth once daily.    amiodarone (PACERONE) 200 MG Tab TAKE 1 AND 1/2 TABLETS(300 MG) BY MOUTH EVERY DAY    aspirin (ECOTRIN) 81 MG EC tablet Take 1 tablet (81 mg total) by mouth once daily.    atorvastatin (LIPITOR) 80 MG tablet Take 1 tablet (80 mg total) by mouth once daily.    [] cefpodoxime (VANTIN) 200 MG tablet Take 1 tablet (200 mg total) by mouth every 12 (twelve) hours. for 4 days    diphenhydrAMINE (BENADRYL) 25 mg capsule Take 25 mg by mouth as needed for Allergies.    DOBUTamine (DOBUTREX) 500 mg/250 mL (2,000 mcg/mL) 2.5 mcg/kg/min  Patient is 95.7 kg    furosemide (LASIX) 40 MG tablet Take 1 tablet (40 mg total) by mouth once daily. HOLD UNTIL TOLD TO RESUME BY PHYSICIAN    metoprolol succinate (TOPROL-XL) 25 MG 24 hr tablet Take 1 tablet (25 mg total) by mouth once daily.    midodrine (PROAMATINE) 2.5 MG Tab Take 1 tablet (2.5 mg total) by mouth 3 (three) times daily. HOLD until follow up with cardiology    ondansetron (ZOFRAN-ODT) 4 MG TbDL Take 1 tablet (4 mg total) by mouth every 6 (six) hours as needed (nausea).     oxyCODONE-acetaminophen (PERCOCET) 5-325 mg per tablet Take 1 tablet by mouth every 6 (six) hours as needed for Pain.    paricalcitoL (ZEMPLAR) 1 MCG capsule Take 1 capsule (1 mcg total) by mouth once daily.    predniSONE (DELTASONE) 20 MG tablet Take 2 tablets (40 mg total) by mouth once daily. for 5 days    sacubitriL-valsartan (ENTRESTO) 24-26 mg per tablet Take 1 tablet by mouth 2 (two) times daily. HOLD UNTIL RESUMED BY CARDIOLOGIST    simethicone (MYLICON) 80 MG chewable tablet Take 1 tablet (80 mg total) by mouth every 6 (six) hours as needed for Flatulence.    vancomycin (VANCOCIN) 125 MG capsule Take 1 capsule (125 mg total) by mouth 4 (four) times daily. for 14 days    [DISCONTINUED] clopidogreL (PLAVIX) 75 mg tablet Take 1 tablet (75 mg total) by mouth once daily. (Patient not taking: Reported on 3/18/2022)    [DISCONTINUED] colchicine (COLCRYS) 0.6 mg tablet Take 1 tablet (0.6 mg total) by mouth once daily. (Patient not taking: Reported on 3/18/2022)    [DISCONTINUED] ipratropium (ATROVENT) 0.02 % nebulizer solution Take 2.5 mLs (500 mcg total) by nebulization 4 (four) times daily as needed for Wheezing. Rescue (Patient not taking: Reported on 3/29/2022)     Family History       Problem Relation (Age of Onset)    Cancer Mother, Paternal Grandmother    Colon polyps Father    Diabetes Mother    Heart disease Mother, Father    No Known Problems Sister, Daughter, Son, Sister, Son    Stroke Father          Tobacco Use    Smoking status: Former Smoker     Packs/day: 1.00     Years: 45.00     Pack years: 45.00     Types: Cigarettes     Quit date: 2005     Years since quittin.3    Smokeless tobacco: Never Used    Tobacco comment: 1-1.5 ppd every day.   Substance and Sexual Activity    Alcohol use: No    Drug use: No    Sexual activity: Never     Review of Systems   Constitutional:  Negative for activity change, appetite change, chills, diaphoresis, fatigue and fever.   HENT:  Negative  for rhinorrhea and sore throat.    Respiratory:  Positive for shortness of breath. Negative for cough and chest tightness.    Cardiovascular:  Negative for chest pain and palpitations.   Gastrointestinal:  Positive for diarrhea. Negative for abdominal distention, abdominal pain, constipation and nausea.   Endocrine: Negative for cold intolerance.   Genitourinary:  Negative for decreased urine volume and dysuria.   Musculoskeletal:  Negative for arthralgias and myalgias.   Skin:  Negative for rash and wound.   Neurological:  Negative for dizziness, weakness, numbness and headaches.   Psychiatric/Behavioral:  Negative for agitation, behavioral problems and confusion.    Objective:     Vital Signs (Most Recent):  Temp: 97.4 °F (36.3 °C) (04/06/22 1343)  Pulse: 102 (04/06/22 1343)  Resp: 18 (04/06/22 1248)  BP: 125/68 (04/06/22 1343)  SpO2: (!) 94 % (04/06/22 1343)   Vital Signs (24h Range):  Temp:  [97.4 °F (36.3 °C)-98.8 °F (37.1 °C)] 97.4 °F (36.3 °C)  Pulse:  [] 102  Resp:  [17-49] 18  SpO2:  [94 %-100 %] 94 %  BP: (124-161)/(60-98) 125/68     Weight: 104.3 kg (230 lb)  Body mass index is 36.02 kg/m².    Physical Exam  Constitutional:       Appearance: Normal appearance. He is normal weight.   HENT:      Head: Normocephalic and atraumatic.      Mouth/Throat:      Mouth: Mucous membranes are moist.   Eyes:      Extraocular Movements: Extraocular movements intact.      Conjunctiva/sclera: Conjunctivae normal.   Cardiovascular:      Rate and Rhythm: Regular rhythm. Tachycardia present.      Heart sounds: No murmur heard.  Pulmonary:      Effort: Pulmonary effort is normal. No respiratory distress.      Breath sounds: Normal breath sounds. No wheezing or rales.   Abdominal:      General: Abdomen is flat. There is no distension.      Palpations: Abdomen is soft.      Tenderness: There is no abdominal tenderness. There is no guarding.   Musculoskeletal:         General: No swelling or tenderness.      Right lower  leg: No edema.      Left lower leg: Edema present.   Skin:     Findings: No rash.   Neurological:      General: No focal deficit present.      Mental Status: He is alert and oriented to person, place, and time. Mental status is at baseline.   Psychiatric:         Mood and Affect: Mood normal.         Behavior: Behavior normal.           Significant Labs: CBC:   Recent Labs   Lab 04/06/22 0441   WBC 18.08*   HGB 12.4*   HCT 39.6*        CMP:   Recent Labs   Lab 04/06/22 0441      K 4.4   *   CO2 18*   GLU 94   BUN 35*   CREATININE 1.2   CALCIUM 9.3   PROT 6.8   ALBUMIN 3.2*   BILITOT 0.3   ALKPHOS 81   AST 22   ALT 17   ANIONGAP 12   EGFRNONAA >60.0     Cardiac Markers:   Recent Labs   Lab 04/06/22 0441   BNP 1,396*       Significant Imaging: I have reviewed all pertinent imaging results/findings within the past 24 hours.    Assessment/Plan:     Acute on chronic combined systolic and diastolic heart failure  Patient presenting with dyspnea setting of holding diuresis from previous hospital admission.  - BMP 3100  - Home: Lasix 40mg  - s/p Lasix 40mg in the ED  - Holding metoprolol, spironolactone  - Cardiology consulted  - Continue home Dobutamine 2.5  - Continue Entresto   - Lasix 40mg IV BID  - Daily standing weight   - Strict input/output  - Daily BMP  - Mg>2, K>4  - Telemetry      Clostridium difficile infection  - Continue p.o. vancomycin  - End of treatment 14 days after antibiotics for urinary infection, 4/16      Leukocytosis  - Patient with current C diff treatment and recently completed steroids for gout flare.   - WBC 18 on admission. Afebrile.  - Monitor leukocytosis      Gout  - Continue home allopurinol      Mixed hyperlipidemia  - Continue home atorvastatin, aspirin      Ventricular tachycardia  - Continue home amiodarone    Coronary artery disease involving native coronary artery of native heart without angina pectoris  Continue aspirin 81      VTE Risk Mitigation (From  admission, onward)         Ordered     Place sequential compression device  Until discontinued         04/06/22 1110     IP VTE HIGH RISK PATIENT  Once         04/06/22 1110                   Cholo Hudson MD  Department of Hospital Medicine   [facility] - Cardiology Stepdown

## 2022-04-07 NOTE — PROGRESS NOTES
[facility] ([facility]) Team notified of pt referral via [facility] Failure [facility] (automated inbasket notification) .    Patient screened today by provider and RN for enrollment to program.      Pt was deemed not a candidate for enrollment at this time related to patient is [place] [facility]     Pt will require additional follow up planning per primary team.     If pt status, diagnosis, or treatment plan changes , please place AMB referral to [facility] ([id number]).

## 2022-04-07 NOTE — HPI
Mr. Oneil is a 71 M with PMHx of CAD, HFrEF (10-20%) on chronic dobutamine gtt, s/p AICD, h/o PE, HTN, HLD who presents to [facility] ED with 1 day dyspnea.  Patient was recently discharged from [facility] with pyelonephritis, gout flare, and C diff infection.  After discharge, patient was holding home diuretics per instructions.  On night prior to admission, patient began to feel short of breath with ambulation intent to take home diuretic without improvement.  Patient continued to feel short of breath in the morning and presented to the ED for further evaluation.  Patient denies PND, orthopnea, chest pain, lightheadedness, dizziness, nausea, abdominal pain, fevers, chills, and constipation.  Continues to endorse loose stools while being treated for C diff.    In the ED, patient HDS. Labs notable for BNP 1396, WBC 18, and creatinine 1.2.  CT A/P with no significant findings.  Chest x-ray with bilateral edema, unchanged from prior study.  BLE DVT ultrasound negative.  Cardiology consult in the ED.  Patient given 40 mg IV Lasix.

## 2022-04-07 NOTE — PLAN OF CARE
Recommendations:    1. Continue current low sodium diet- fluid per MD  2. Add Boost Plus (chocolate falvor) 1x/day- per pt request   3. RD following     Goals: Meet % EEN, EPN by RD f/u date  Nutrition Goal Status: new  Communication of RD Recs: POC    By Samantha ORELLANA

## 2022-04-07 NOTE — PLAN OF CARE
Problem: Glycemic Control Impaired (Sepsis/Septic Shock)  Goal: Blood Glucose Level Within Desired Range  Outcome: Ongoing, Progressing     Problem: Infection Progression (Sepsis/Septic Shock)  Goal: Absence of Infection Signs and Symptoms  Outcome: Ongoing, Progressing     Problem: Nutrition Impaired (Sepsis/Septic Shock)  Goal: Optimal Nutrition Intake  Outcome: Ongoing, Progressing     Problem: Fluid and Electrolyte Imbalance (Acute Kidney Injury/Impairment)  Goal: Fluid and Electrolyte Balance  Outcome: Ongoing, Progressing     Problem: Oral Intake Inadequate (Acute Kidney Injury/Impairment)  Goal: Optimal Nutrition Intake  Outcome: Ongoing, Progressing     Problem: Renal Function Impairment (Acute Kidney Injury/Impairment)  Goal: Effective Renal Function  Outcome: Ongoing, Progressing     Problem: Infection  Goal: Absence of Infection Signs and Symptoms  Outcome: Ongoing, Progressing     Problem: Impaired Wound Healing  Goal: Optimal Wound Healing  Outcome: Ongoing, Progressing   Cardiology Step Down. See progress note and flowsheet. Plan: Continue to monitor patient and report any abnormalities in labs, vitals signs, and body system functions to physician as indicated. Patient was given education on their disease process and medications.

## 2022-04-07 NOTE — PLAN OF CARE
Received awake, alert, following commands, vss, no c/o pain. Dobutamine infusing at 2.5mcg/kg/min which equals 3.9ml/hr. Resting comfortably will continue to monitor this shift.  Problem: Glycemic Control Impaired (Sepsis/Septic Shock)  Goal: Blood Glucose Level Within Desired Range  Outcome: Ongoing, Progressing     Problem: Infection Progression (Sepsis/Septic Shock)  Goal: Absence of Infection Signs and Symptoms  Outcome: Ongoing, Progressing     Problem: Nutrition Impaired (Sepsis/Septic Shock)  Goal: Optimal Nutrition Intake  Outcome: Ongoing, Progressing     Problem: Fluid and Electrolyte Imbalance (Acute Kidney Injury/Impairment)  Goal: Fluid and Electrolyte Balance  Outcome: Ongoing, Progressing     Problem: Oral Intake Inadequate (Acute Kidney Injury/Impairment)  Goal: Optimal Nutrition Intake  Outcome: Ongoing, Progressing     Problem: Renal Function Impairment (Acute Kidney Injury/Impairment)  Goal: Effective Renal Function  Outcome: Ongoing, Progressing     Problem: Infection  Goal: Absence of Infection Signs and Symptoms  Outcome: Ongoing, Progressing     Problem: Impaired Wound Healing  Goal: Optimal Wound Healing  Outcome: Ongoing, Progressing

## 2022-04-07 NOTE — CONSULTS
Food & Nutrition  Education    Diet Education:  Low sodium/fluid restriction  Time Spent: 10 minutes   Learners: Pt       Nutrition Education provided with handouts: RD reviewed sodium restriction and foods high in sodium. Reviewed how to add flavors to food without adding sodium.  Reviewed fluid restriction and foods considered fluids. Encouraged pt to monitor weights daily. Pt verbalized understanding. Educational materials left with Pt.      Comments: Spoke w/ pt at bedside. Reports a relatively good appetite currently and PTA. Reports attempting to eat food at hospital. Reports that his food allergies consist of peaches, shellfish, and figs. Pt requested ONS~Boost Plus. Reports that he follows a low-sodium diet at home and gets pre-packaged foods (from his insurance company) that have low salt. No issues chewing/swallowing at this time. Weight fluctuations noted d/t fluid loss. Reports swelling in his foot feels much better. NFPE completed 4/7/22 no s/s of malnutrition at this time.       All questions and concerns answered. Dietitian's contact information provided.       Follow-Up: Yes    Please Re-consult as needed    By Samantha ORELLANA      Thanks!

## 2022-04-07 NOTE — SUBJECTIVE & OBJECTIVE
Interval History: VIDA. Received lasix 40mg IV yesterday morning in ED but did not receive lasix last night. Had good response yesterday morning. He subjectively feels his swelling is much better.    ROS  Objective:     Vital Signs (Most Recent):  Temp: 99 °F (37.2 °C) (04/07/22 0717)  Pulse: 102 (04/07/22 0727)  Resp: 18 (04/07/22 0717)  BP: 138/73 (04/07/22 0717)  SpO2: 97 % (04/07/22 0717) Vital Signs (24h Range):  Temp:  [97.4 °F (36.3 °C)-99 °F (37.2 °C)] 99 °F (37.2 °C)  Pulse:  [] 102  Resp:  [18-20] 18  SpO2:  [94 %-99 %] 97 %  BP: (124-161)/(60-74) 138/73     Weight: 102 kg (224 lb 13.9 oz)  Body mass index is 35.22 kg/m².     SpO2: 97 %  O2 Device (Oxygen Therapy): nasal cannula      Intake/Output Summary (Last 24 hours) at 4/7/2022 0912  Last data filed at 4/7/2022 0500  Gross per 24 hour   Intake 400 ml   Output 550 ml   Net -150 ml       Lines/Drains/Airways       Peripherally Inserted Central Catheter Line  Duration             PICC Double Lumen right basilic -- days              Drain  Duration             Male External Urinary Catheter 04/06/22 0452 1 day                    Physical Exam  Vitals and nursing note reviewed.   Constitutional:       General: He is not in acute distress.     Appearance: He is not toxic-appearing or diaphoretic.   HENT:      Head: Normocephalic.   Cardiovascular:      Rate and Rhythm: Normal rate and regular rhythm.      Heart sounds: Murmur (soft systolic murmur present) heard.   Pulmonary:      Effort: Pulmonary effort is normal. No respiratory distress.      Breath sounds: Rales (L lung rales) present.   Abdominal:      Palpations: Abdomen is soft.      Tenderness: There is no guarding.   Musculoskeletal:      Right lower leg: Edema (R>L) present.      Left lower leg: Edema present.   Skin:     General: Skin is warm and dry.   Neurological:      Mental Status: He is alert. Mental status is at baseline.      Cranial Nerves: No dysarthria.   Psychiatric:          Mood and Affect: Mood normal.         Behavior: Behavior normal.       Significant Labs: All pertinent lab results from the last 24 hours have been reviewed.    Significant Imaging:  na

## 2022-04-07 NOTE — ASSESSMENT & PLAN NOTE
"CC:Lip injury     HPI:  Danie is a 15 month old male who per mother was running fell and \" face planted \" causing upper frenulum to rip and bleed He is not actively bleeding or in pain No fever No LOC or vomiting Mother stated that child would not let her evaluate \"damage \" and wanted him assessed       Patient Active Problem List    Diagnosis Date Noted   • Hemangioma of skin 2016       Current Outpatient Prescriptions   Medication Sig Dispense Refill   • acetaminophen (TYLENOL) 80 MG/0.8ML Suspension Take 15 mg/kg by mouth every four hours as needed.       No current facility-administered medications for this visit.         Patient has no known allergies.       Social History     Other Topics Concern   • Second-Hand Smoke Exposure No   • Violence Concerns No   • Family Concerns Vehicle Safety No     Social History Narrative   • No narrative on file       Family History   Problem Relation Age of Onset   • Arrythmia Father      WPW       Past Surgical History:   Procedure Laterality Date   • CIRCUMCISION CHILD         ROS:    See HPI above. All other systems were reviewed and are negative.    Pulse 130   Temp 36.2 °C (97.2 °F)   Resp 28   Ht 0.851 m (2' 9.5\")   Wt 12.2 kg (26 lb 14.1 oz)   BMI 16.84 kg/m²     Physical Exam:  Gen:         Alert, active, well appearing  HEENT:   PERRLA, TM's clear b/l, oropharynx with no erythema or exudate, upper lip frenulum with laceration and  from upper gum, mild bleeding easily stopped with a pop cycle No loose teeth , tongue is midline with no lesions or lacerations Oral cavity in tact   Neck:       Supple, FROM without tenderness, no lymphadenopathy  Lungs:     Clear to auscultation bilaterally, no wheezes/rales/rhonchi  CV:          Regular rate and rhythm. Normal S1/S2.  No murmurs.  Good pulses                Ext:         WWP, no cyanosis, no edema  Skin:       No rashes or bruising.      Assessment and Plan.  1. Oral injury, initial " Patient presenting with dyspnea setting of holding diuresis from previous hospital admission.  - BMP 3100  - Home: Lasix 40mg  - s/p Lasix 40mg in the ED  - Holding metoprolol, spironolactone  - Cardiology consulted  - Continue home Dobutamine 2.5  - Continue Entresto   - Lasix 40mg IV BID  - Daily standing weight   - Strict input/output  - Daily BMP  - Mg>2, K>4  - Telemetry     encounter  Management of symptoms is discussed and expected course is outlined. Medication administration is reviewed . Child is to return to office if no improvement is noted/WCC as planned

## 2022-04-07 NOTE — ASSESSMENT & PLAN NOTE
70 y/o M hx ICM LVEF 15% on palli dobutamine and LBBB, s/p BiV-ICD, CAD, HTN, HLD presenting with acute on chronic CHF.     Suspect decompensated heart failure in setting of recent discontinuation of GDMT, diuretic given concern for LAVERNE. Suspect LAVERNE may have been related to sepsis, UTI and that patient has been volume overloaded for several days with HTN exacerbating current episode.    - continue home dobutamine (hold all beta blockers)  - continue entresto 24-26 bid  - continue IV lasix 40mg bid (goal net negative 2-3L daily)  - Hold off on spironolactone for now given recent LAVERNE  - recommend initiation of SGLT2 inhibitor at discharge for mortality benefit

## 2022-04-08 PROBLEM — D72.829 LEUKOCYTOSIS: Status: RESOLVED | Noted: 2022-01-01 | Resolved: 2022-01-01

## 2022-04-08 NOTE — PLAN OF CARE
[facility] - Cardiology Stepdown      HOME HEALTH ORDERS  FACE TO FACE ENCOUNTER    Patient Name: Audrey Oneil Jr.  YOB: 1950    PCP: January Khan MD   PCP Address: [address] / [facility] [place] [address]  PCP Phone Number: [phone]  PCP Fax: [phone]    Encounter Date: 4/6/22    Admit to Home Health    Diagnoses:  Active Hospital Problems    Diagnosis  POA    Clostridium difficile infection [A49.8]  Yes    Gout [M10.9]  Yes    Mixed hyperlipidemia [E78.2]  Yes    Ventricular tachycardia [I47.2]  Yes    Coronary artery disease involving native coronary artery of native heart without angina pectoris [I25.10]  Yes     Chronic     Cath 10- Stents patent non-obstructive disease  Cath 11-12015 non-obstructive disease        Resolved Hospital Problems    Diagnosis Date Resolved POA    *Acute on chronic combined systolic and diastolic heart failure [I50.43] 04/08/2022 Yes     11/30/15 Echo:  1 - Eccentric LVH with severely depressed left ventricular systolic function (EF 15-20%).   2 - Mild left atrial enlargement.   3 - Left ventricular diastolic dysfunction.   4 - Normal right ventricular systolic function .       Leukocytosis [D72.829] 04/08/2022 Yes       Follow Up Appointments:  Future Appointments   Date Time Provider Department Center   4/13/2022 10:30 AM LAB, APPOINTMENT [facility]   4/13/2022 11:30 AM Migue Henry Jr., MD [facility] HEARTTX [facility]   5/9/2022 10:00 AM HOME MONITOR DEVICE CHECK, [facility]       Allergies:  Review of patient's allergies indicates:   Allergen Reactions    Iodine containing multivitamin Swelling     itching    Keflex [cephalexin] Swelling     Eyes.  Tolerated multiple doses of zosyn and 1 dose of augmentin in 2015 and 2016, respectively    Peaches [peach (prunus persica)] Swelling     eyes    Shellfish containing products Swelling    Fig tree Swelling     itching    Tuberculin spenser test ppd  Rash       Medications: Review discharge medications with patient and family and provide education.    Current Facility-Administered Medications   Medication Dose Route Frequency Provider Last Rate Last Admin    allopurinoL tablet 200 mg  200 mg Oral Daily Cholo Hudson MD   200 mg at 04/08/22 1040    amiodarone tablet 300 mg  300 mg Oral Daily Cholo Hudson MD   300 mg at 04/08/22 0827    aspirin EC tablet 81 mg  81 mg Oral Daily Cholo Hudson MD   81 mg at 04/08/22 0826    atorvastatin tablet 80 mg  80 mg Oral Daily Cholo Hudson MD   80 mg at 04/08/22 0828    diphenhydrAMINE capsule 25 mg  25 mg Oral PRN Cholo Hudson MD   25 mg at 04/07/22 2058    DOBUtamine 1000 mg in D5W 250 mL infusion  2.5 mcg/kg/min Intravenous Continuous Cholo Hudson MD 3.9 mL/hr at 04/06/22 2117 2.5 mcg/kg/min at 04/06/22 2117    furosemide injection 40 mg  40 mg Intravenous BID loop Cholo Hudson MD   40 mg at 04/08/22 0828    mupirocin 2 % ointment   Nasal BID Cholo Hudson MD   Given at 04/08/22 0828    ondansetron disintegrating tablet 4 mg  4 mg Oral Q6H PRN Cholo Hudson MD        oxyCODONE-acetaminophen 5-325 mg per tablet 1 tablet  1 tablet Oral Q6H PRN Cholo Hudson MD   1 tablet at 04/08/22 1036    sacubitriL-valsartan 24-26 mg per tablet 1 tablet  1 tablet Oral BID Cholo Hudson MD   1 tablet at 04/08/22 0826    simethicone chewable tablet 80 mg  80 mg Oral Q6H PRN Cholo Hudson MD        sodium chloride 0.9% flush 10 mL  10 mL Intravenous PRN Cholo Hudson MD        vancomycin 125 mg/5 mL oral solution 125 mg  125 mg Oral Q6H Cholo Hudson MD   125 mg at 04/08/22 0513     Current Discharge Medication List      START taking these medications    Details   mupirocin (BACTROBAN) 2 % ointment by Nasal route 2 (two) times daily. Apply to both nares for 2 days  Qty: 1 g, Refills: 0      spironolactone (ALDACTONE) 25 MG tablet Take 1 tablet (25 mg  total) by mouth once daily.  Qty: 30 tablet, Refills: 2    Comments: .         CONTINUE these medications which have CHANGED    Details   allopurinoL (ZYLOPRIM) 100 MG tablet Take 2 tablets (200 mg total) by mouth once daily.  Qty: 30 tablet, Refills: 0    Associated Diagnoses: Chronic gout without tophus, unspecified cause, unspecified site      aspirin (ECOTRIN) 81 MG EC tablet Take 1 tablet (81 mg total) by mouth once daily.  Qty: 90 tablet, Refills: 3      furosemide (LASIX) 40 MG tablet Take 1 tablet (40 mg total) by mouth 2 (two) times a day.  Qty: 60 tablet, Refills: 2      ondansetron (ZOFRAN-ODT) 4 MG TbDL Take 1 tablet (4 mg total) by mouth every 6 (six) hours as needed (nausea).  Qty: 30 tablet, Refills: 1      oxyCODONE-acetaminophen (PERCOCET) 5-325 mg per tablet Take 1 tablet by mouth every 6 (six) hours as needed for Pain.  Qty: 28 tablet, Refills: 0    Comments: Quantity prescribed more than 7 day supply? No      sacubitriL-valsartan (ENTRESTO) 24-26 mg per tablet Take 1 tablet by mouth 2 (two) times daily.  Qty: 60 tablet, Refills: 2      simethicone (MYLICON) 80 MG chewable tablet Take 1 tablet (80 mg total) by mouth every 6 (six) hours as needed for Flatulence.  Qty: 60 tablet, Refills: 1         CONTINUE these medications which have NOT CHANGED    Details   amiodarone (PACERONE) 200 MG Tab TAKE 1 AND 1/2 TABLETS(300 MG) BY MOUTH EVERY DAY  Qty: 135 tablet, Refills: 3      atorvastatin (LIPITOR) 80 MG tablet Take 1 tablet (80 mg total) by mouth once daily.  Qty: 90 tablet, Refills: 1    Associated Diagnoses: Mixed hyperlipidemia      diphenhydrAMINE (BENADRYL) 25 mg capsule Take 25 mg by mouth as needed for Allergies.      DOBUTamine (DOBUTREX) 500 mg/250 mL (2,000 mcg/mL) 2.5 mcg/kg/min  Patient is 95.7 kg  Qty: 250 mL, Refills: 5      vancomycin (VANCOCIN) 125 MG capsule Take 1 capsule (125 mg total) by mouth 4 (four) times daily. for 14 days  Qty: 56 capsule, Refills: 0         STOP taking  these medications       cefpodoxime (VANTIN) 200 MG tablet Comments:   Reason for Stopping:         clopidogreL (PLAVIX) 75 mg tablet Comments:   Reason for Stopping:         colchicine (COLCRYS) 0.6 mg tablet Comments:   Reason for Stopping:         ipratropium (ATROVENT) 0.02 % nebulizer solution Comments:   Reason for Stopping:         metoprolol succinate (TOPROL-XL) 25 MG 24 hr tablet Comments:   Reason for Stopping:         midodrine (PROAMATINE) 2.5 MG Tab Comments:   Reason for Stopping:         paricalcitoL (ZEMPLAR) 1 MCG capsule Comments:   Reason for Stopping:         predniSONE (DELTASONE) 20 MG tablet Comments:   Reason for Stopping:                 I have seen and examined this patient within the last 30 days. My clinical findings that support the need for the home health skilled services and home bound status are the following:no   Weakness/numbness causing balance and gait disturbance due to Heart Failure, Coronary Heart Disease, Weakness/Debility and Anemia making it taxing to leave home.     Diet:   fluid restriction and 2 gram sodium diet    Labs:  SN to perform labs:  CBC: Weekly; 2 week(s) and BMP: Weekly; 2 week(s) and Report Lab results to PCP.    Referrals/ Consults  N/A    Activities:   activity as tolerated    Nursing:   Agency to admit patient within 24 hours of hospital discharge unless specified on physician order or at patient request    SN to complete comprehensive assessment including routine vital signs. Instruct on disease process and s/s of complications to report to MD. Review/verify medication list sent home with the patient at time of discharge  and instruct patient/caregiver as needed. Frequency may be adjusted depending on start of care date.     Skilled nurse to perform up to 3 visits PRN for symptoms related to diagnosis    Notify MD if SBP > 160 or < 90; DBP > 90 or < 50; HR > 120 or < 50; Temp > 101; O2 < 88%    Ok to schedule additional visits based on staff availability  and patient request on consecutive days within the home health episode.    When multiple disciplines ordered:    Start of Care occurs on Sunday - Wednesday schedule remaining discipline evaluations as ordered on separate consecutive days following the start of care.    Thursday SOC -schedule subsequent evaluations Friday and Monday the following week.     Friday - Saturday SOC - schedule subsequent discipline evaluations on consecutive days starting Monday of the following week.    For all post-discharge communication and subsequent orders please contact patient's primary care physician. If unable to reach primary care physician or do not receive response within 30 minutes, please contact hospitalist on-call for clinical staff order clarification    Miscellaneous   Routine Skin for Bedridden Patients: Instruct patient/caregiver to apply moisture barrier cream to all skin folds and wet areas in perineal area daily and after baths and all bowel movements.  Heart Failure:      SN to instruct on the following:    Instruct on the definition of CHF.   Instruct on the signs/sympoms of CHF to be reported.   Instruct on and monitor daily weights.   Instruct on factors that cause exacerbation.   Instruct on action, dose, schedule, and side effects of medications.   Instruct on diet as prescribed.   Instruct on activity allowed.   Instruct on life-style modifications for life long management of CHF   SN to assess compliance with daily weights, diet, medications, fluid retention,    safety precautions, activities permitted and life-style modifications.   Additional 1-2 SN visits per week as needed for signs and symptoms     of CHF exacerbation.      For Weight Gain > 2-3 lbs in 1 day or 4-6 lbs over 1 week notify PCP:  Obtain BMP lab test in 3 days  Home Oxygen:  Oxygen at 2 L/min nasal canula to be used:  Continuously.    Home Health Aide:  Nursing Three times weekly    Wound Care Orders  no    I certify that this patient is  confined to his home and needs intermittent skilled nursing care.

## 2022-04-08 NOTE — PLAN OF CARE
[facility] - Cardiology Stepdown      HOME HEALTH ORDERS  FACE TO FACE ENCOUNTER    Patient Name: Audrey Oneil Jr.  YOB: 1950    PCP: January Khan MD   PCP Address: [address] / [place] [address]  PCP Phone Number: [phone]  PCP Fax: [phone]    Encounter Date: 4/6/22    Admit to Home Health    Diagnoses:  Active Hospital Problems    Diagnosis  POA    Clostridium difficile infection [A49.8]  Yes    Gout [M10.9]  Yes    Mixed hyperlipidemia [E78.2]  Yes    Ventricular tachycardia [I47.2]  Yes    Coronary artery disease involving native coronary artery of native heart without angina pectoris [I25.10]  Yes     Chronic     Cath 10- Stents patent non-obstructive disease  Cath 11-12015 non-obstructive disease        Resolved Hospital Problems    Diagnosis Date Resolved POA    *Acute on chronic combined systolic and diastolic heart failure [I50.43] 04/08/2022 Yes     11/30/15 Echo:  1 - Eccentric LVH with severely depressed left ventricular systolic function (EF 15-20%).   2 - Mild left atrial enlargement.   3 - Left ventricular diastolic dysfunction.   4 - Normal right ventricular systolic function .       Leukocytosis [D72.829] 04/08/2022 Yes       Follow Up Appointments:  Future Appointments   Date Time Provider Department Center   4/13/2022 10:30 AM LAB, APPOINTMENT [facility]   4/13/2022 11:30 AM Migue Henry Jr., MD [facility]   4/14/2022  1:00 PM Reji Winchester MD [facility] IM [facility]   5/9/2022 10:00 AM HOME MONITOR DEVICE CHECK, [facility]       Allergies:  Review of patient's allergies indicates:   Allergen Reactions    Iodine containing multivitamin Swelling     itching    Keflex [cephalexin] Swelling     Eyes.  Tolerated multiple doses of zosyn and 1 dose of augmentin in 2015 and 2016, respectively    Peaches [peach (prunus persica)] Swelling     eyes    Shellfish containing products Swelling     Fig tree Swelling     itching    Tuberculin spenser test ppd Rash       Medications: Review discharge medications with patient and family and provide education.    Resume Dobutamine drip as previously ordered    Current Facility-Administered Medications   Medication Dose Route Frequency Provider Last Rate Last Admin    allopurinoL tablet 200 mg  200 mg Oral Daily Cholo Hudson MD   200 mg at 04/08/22 1040    amiodarone tablet 300 mg  300 mg Oral Daily Cholo Hudson MD   300 mg at 04/08/22 0827    aspirin EC tablet 81 mg  81 mg Oral Daily Cholo Hudson MD   81 mg at 04/08/22 0826    atorvastatin tablet 80 mg  80 mg Oral Daily Cholo Hudson MD   80 mg at 04/08/22 0828    diphenhydrAMINE capsule 25 mg  25 mg Oral PRN Cholo Hudson MD   25 mg at 04/07/22 2058    DOBUtamine 1000 mg in D5W 250 mL infusion  2.5 mcg/kg/min Intravenous Continuous Cholo Hudson MD 3.9 mL/hr at 04/06/22 2117 2.5 mcg/kg/min at 04/06/22 2117    furosemide injection 40 mg  40 mg Intravenous BID loop Cholo Hudson MD   40 mg at 04/08/22 0828    mupirocin 2 % ointment   Nasal BID Cholo Hudson MD   Given at 04/08/22 0828    ondansetron disintegrating tablet 4 mg  4 mg Oral Q6H PRN Cholo Hudson MD        oxyCODONE-acetaminophen 5-325 mg per tablet 1 tablet  1 tablet Oral Q6H PRN Cholo Hudson MD   1 tablet at 04/08/22 1036    sacubitriL-valsartan 24-26 mg per tablet 1 tablet  1 tablet Oral BID Cholo Hudson MD   1 tablet at 04/08/22 0826    simethicone chewable tablet 80 mg  80 mg Oral Q6H PRN Cholo Hudson MD        sodium chloride 0.9% flush 10 mL  10 mL Intravenous PRN Cholo Hudson MD        vancomycin 125 mg/5 mL oral solution 125 mg  125 mg Oral Q6H Cholo Hudson MD   125 mg at 04/08/22 0513     Current Discharge Medication List      START taking these medications    Details   empagliflozin (JARDIANCE) 25 mg tablet Take 1 tablet (25 mg total) by mouth once  daily.  Qty: 30 tablet, Refills: 2      mupirocin (BACTROBAN) 2 % ointment Apply to both nares twice daily for 2 days  Qty: 22 g, Refills: 0      spironolactone (ALDACTONE) 25 MG tablet Take 1 tablet (25 mg total) by mouth once daily.  Qty: 30 tablet, Refills: 2    Comments: .         CONTINUE these medications which have CHANGED    Details   allopurinoL (ZYLOPRIM) 100 MG tablet Take 2 tablets (200 mg total) by mouth once daily.  Qty: 30 tablet, Refills: 0    Associated Diagnoses: Chronic gout without tophus, unspecified cause, unspecified site      aspirin (ECOTRIN) 81 MG EC tablet Take 1 tablet (81 mg total) by mouth once daily.  Qty: 90 tablet, Refills: 3      furosemide (LASIX) 40 MG tablet Take 1 tablet (40 mg total) by mouth 2 (two) times a day.  Qty: 60 tablet, Refills: 2      ondansetron (ZOFRAN-ODT) 4 MG TbDL Dissolve 1 tablet (4 mg total) by mouth every 6 (six) hours as needed (nausea).  Qty: 30 tablet, Refills: 1      oxyCODONE-acetaminophen (PERCOCET) 5-325 mg per tablet Take 1 tablet by mouth every 6 (six) hours as needed for Pain.  Qty: 28 tablet, Refills: 0    Comments: Quantity prescribed more than 7 day supply? No      sacubitriL-valsartan (ENTRESTO) 24-26 mg per tablet Take 1 tablet by mouth 2 (two) times daily.  Qty: 60 tablet, Refills: 2      simethicone (MYLICON) 80 MG chewable tablet Take 1 tablet (80 mg total) by mouth every 6 (six) hours as needed for Flatulence.  Qty: 60 tablet, Refills: 1         CONTINUE these medications which have NOT CHANGED    Details   amiodarone (PACERONE) 200 MG Tab TAKE 1 AND 1/2 TABLETS(300 MG) BY MOUTH EVERY DAY  Qty: 135 tablet, Refills: 3      atorvastatin (LIPITOR) 80 MG tablet Take 1 tablet (80 mg total) by mouth once daily.  Qty: 90 tablet, Refills: 1    Associated Diagnoses: Mixed hyperlipidemia      diphenhydrAMINE (BENADRYL) 25 mg capsule Take 25 mg by mouth as needed for Allergies.      DOBUTamine (DOBUTREX) 500 mg/250 mL (2,000 mcg/mL) 2.5  mcg/kg/min  Patient is 95.7 kg  Qty: 250 mL, Refills: 5      vancomycin (VANCOCIN) 125 MG capsule Take 1 capsule (125 mg total) by mouth 4 (four) times daily. for 14 days  Qty: 56 capsule, Refills: 0         STOP taking these medications       cefpodoxime (VANTIN) 200 MG tablet Comments:   Reason for Stopping:         clopidogreL (PLAVIX) 75 mg tablet Comments:   Reason for Stopping:         colchicine (COLCRYS) 0.6 mg tablet Comments:   Reason for Stopping:         ipratropium (ATROVENT) 0.02 % nebulizer solution Comments:   Reason for Stopping:         metoprolol succinate (TOPROL-XL) 25 MG 24 hr tablet Comments:   Reason for Stopping:         midodrine (PROAMATINE) 2.5 MG Tab Comments:   Reason for Stopping:         paricalcitoL (ZEMPLAR) 1 MCG capsule Comments:   Reason for Stopping:         predniSONE (DELTASONE) 20 MG tablet Comments:   Reason for Stopping:                 I have seen and examined this patient within the last 30 days. My clinical findings that support the need for the home health skilled services and home bound status are the following:no   Weakness/numbness causing balance and gait disturbance due to Heart Failure, Coronary Heart Disease, Weakness/Debility and Anemia making it taxing to leave home.     Diet:   cardiac diet, fluid restriction and 2 gram sodium diet    Labs:  SN to perform labs:  CBC: Weekly; 2 week(s) and BMP: Weekly; 2 week(s) and Report Lab results to PCP.    Referrals/ Consults  Physical Therapy to evaluate and treat. Evaluate for home safety and equipment needs; Establish/upgrade home exercise program. Perform / instruct on therapeutic exercises, gait training, transfer training, and Range of Motion.  Occupational Therapy to evaluate and treat. Evaluate home environment for safety and equipment needs. Perform/Instruct on transfers, ADL training, ROM, and therapeutic exercises.    Activities:   activity as tolerated    Nursing:   Agency to admit patient within 24 hours of  hospital discharge unless specified on physician order or at patient request    SN to complete comprehensive assessment including routine vital signs. Instruct on disease process and s/s of complications to report to MD. Review/verify medication list sent home with the patient at time of discharge  and instruct patient/caregiver as needed. Frequency may be adjusted depending on start of care date.     Skilled nurse to perform up to 3 visits PRN for symptoms related to diagnosis    Notify MD if SBP > 160 or < 90; DBP > 90 or < 50; HR > 120 or < 50; Temp > 101; O2 < 88%    Ok to schedule additional visits based on staff availability and patient request on consecutive days within the home health episode.    When multiple disciplines ordered:    Start of Care occurs on Sunday - Wednesday schedule remaining discipline evaluations as ordered on separate consecutive days following the start of care.    Thursday SOC -schedule subsequent evaluations Friday and Monday the following week.     Friday - Saturday SOC - schedule subsequent discipline evaluations on consecutive days starting Monday of the following week.    For all post-discharge communication and subsequent orders please contact patient's primary care physician. If unable to reach primary care physician or do not receive response within 30 minutes, please contact hospitalist on-call for clinical staff order clarification    Miscellaneous   Routine Skin for Bedridden Patients: Instruct patient/caregiver to apply moisture barrier cream to all skin folds and wet areas in perineal area daily and after baths and all bowel movements.  Heart Failure:      SN to instruct on the following:    Instruct on the definition of CHF.   Instruct on the signs/sympoms of CHF to be reported.   Instruct on and monitor daily weights.   Instruct on factors that cause exacerbation.   Instruct on action, dose, schedule, and side effects of medications.   Instruct on diet as  prescribed.   Instruct on activity allowed.   Instruct on life-style modifications for life long management of CHF   SN to assess compliance with daily weights, diet, medications, fluid retention,    safety precautions, activities permitted and life-style modifications.   Additional 1-2 SN visits per week as needed for signs and symptoms     of CHF exacerbation.      For Weight Gain > 2-3 lbs in 1 day or 4-6 lbs over 1 week notify PCP:  Obtain BMP lab test in 3 days  Home Oxygen:  Oxygen at 2 L/min nasal canula to be used:  Continuously.    Home Health Aide:  Nursing Three times weekly, Physical Therapy Three times weekly and Occupational Therapy Three times weekly    Wound Care Orders  yes:  Pressure Ulcer(s) Stage I :   Location: greater trochaneter  Apply Miconzazole:  2% cream                       Frequency:  Twice Daily                             If incontinent of stool or urine, apply thin layer Barrier cream                   twice daily and PRN to wound         Pressure relief measure: foam dressing, change 2x/week      Skin breakdown  - right greater trochanter and post thigh injuries with partial thickness tissue loss.  - likely skin tears.  - right trochanter wound appears to be healing.   - cover with foam dressing and change 2x/weekly. Tues/Fri.  - nursing to maintain pressure injury prevention measures.      Dermatitis associated with moisture  - buttocks/groin/scrotum with redness/irritation likely due to moisture/fungal infection.  - continue miconazole cream bid/prn cleansing.     Pressure injury of heel, stage 2  - pt evaluated for multiple areas of skin breakdown.  - stage 2 pressure injury noted to right heel.  - foam dressing applied. Change 2x/weekly on Tues/Fri.      I certify that this patient is confined to his home and needs intermittent skilled nursing care, physical therapy and occupational therapy.

## 2022-04-08 NOTE — PLAN OF CARE
[facility] - Cardiology Stepdown  Discharge Final Note    Primary Care Provider: January Khan MD    Expected Discharge Date: 4/8/2022    Final Discharge Note (most recent)     Final Note - 04/08/22 1627        Final Note    Assessment Type Final Discharge Note     Anticipated Discharge Disposition Home-Health Care [facility]     Hospital Resources/Appts/Education Provided Appointments scheduled and added to AVS        Post-Acute Status    Post-Acute Authorization Home Health     Home Health Status Set-up Complete/Auth obtained             Per [facility] pt accepted to resume care with [facility] on [place] 4/10.    Important Message from Medicare  Important Message from Medicare regarding Discharge Appeal Rights: Given to patient/caregiver, Explained to patient/caregiver, Signed/date by patient/caregiver     Date IMM was signed: 04/08/22  Time IMM was signed: 1036    Shefali Eagle LMSW  [facility]  [id number]     Nutrition Services Update: Consult received for poor PO intake. Nutrition has been following pt for adequate nutritional intake since 10/1 d/t BMI <19 (= 18.03).     Pt is currently NPO. When diet order was in place, pt was eating % of majority of meals/snacks per ADL documentation. Boost Plus TID also in place for when diet is in place. Will continue monitoring nutritional status to ensure PO intake remains adequate once pt is no longer NPO.     RD following.

## 2022-04-08 NOTE — PLAN OF CARE
"SW twice called [facility] to check on status of HH and twice was told they "haven't gotten around to it yet."  SW reported during both conversations that pt has already been discharged (pt requested to leave to get to an appt).  Escalated to  leadership.    Shefali Eagle LMSW  [facility]  [id number]    "

## 2022-04-08 NOTE — PLAN OF CARE
Goals and plan of care reviewed and mutually agreed upon including: continue treatments as ordered, increase diet and activity as tolerated, remain fever and fall free, anticipate discharge today, home infusion pump at bedside with meds loaded. Patient encouraged to call for assistance with ambulation, bed in lowest and locked position, all belongings and call bell in reach.

## 2022-04-08 NOTE — NURSING
Discharge instructions and education provided including: s/s of worsening condition, indication for medications and s/s of adverse reaction and necessity to attend follow up appts, verbalized understanding, no further questions at this time. Patient prepared for discharge, home infusion pump with dobutamine at bedside, picc line dsg c/d/i, ports flush easily, brisk blood return. Ambulating in room denies sob/weakness/dizziness/pain. All belongings packed, awaiting bedside med delivery and ride.

## 2022-04-08 NOTE — PROGRESS NOTES
[facility] - [facility] Stepdown  Cardiology  Progress Note    Patient Name: Audrey Oneil Jr.  MRN: [id number]  Admission Date: 4/6/2022  Hospital Length of Stay: 2 days  Code Status: Full Code   Attending Physician: Grant Zamorano MD   Primary Care Physician: January Khan MD  Expected Discharge Date: 4/8/2022  Principal Problem:Acute on chronic combined systolic and diastolic heart failure    Subjective:     Hospital Course:   No notes on file    Interval History: NAEON. Feels back to baseline this morning and wants to go home.    ROS  Objective:     Vital Signs (Most Recent):  Temp: 98.1 °F (36.7 °C) (04/08/22 0838)  Pulse: (!) 117 (04/08/22 0838)  Resp: 18 (04/08/22 1036)  BP: 112/64 (04/08/22 0838)  SpO2: 97 % (04/08/22 0838) Vital Signs (24h Range):  Temp:  [97.8 °F (36.6 °C)-98.5 °F (36.9 °C)] 98.1 °F (36.7 °C)  Pulse:  [] 117  Resp:  [16-20] 18  SpO2:  [94 %-97 %] 97 %  BP: (112-130)/(56-67) 112/64     Weight: 102 kg (224 lb 13.9 oz)  Body mass index is 35.22 kg/m².     SpO2: 97 %  O2 Device (Oxygen Therapy): nasal cannula      Intake/Output Summary (Last 24 hours) at 4/8/2022 1041  Last data filed at 4/8/2022 0900  Gross per 24 hour   Intake 942 ml   Output 2251 ml   Net -1309 ml         Lines/Drains/Airways       Peripherally Inserted Central Catheter Line  Duration             PICC Double Lumen right basilic -- days                    Physical Exam  Vitals and nursing note reviewed.   Constitutional:       General: He is not in acute distress.     Appearance: He is not toxic-appearing or diaphoretic.   HENT:      Head: Normocephalic.   Cardiovascular:      Rate and Rhythm: Regular rhythm. Tachycardia present.   Pulmonary:      Effort: Pulmonary effort is normal. No respiratory distress.   Abdominal:      Palpations: Abdomen is soft.      Tenderness: There is no guarding.   Musculoskeletal:      Right lower leg: No edema.      Left lower leg: No edema.   Skin:     General: Skin is warm and dry.    Neurological:      Mental Status: He is alert. Mental status is at baseline.      Cranial Nerves: No dysarthria.   Psychiatric:         Mood and Affect: Mood normal.         Behavior: Behavior normal.       Significant Labs: All pertinent lab results from the last 24 hours have been reviewed.    Significant Imaging:  na    Assessment and Plan:       * Acute on chronic combined systolic and diastolic heart failure  70 y/o M hx ICM LVEF 15% on palli dobutamine and LBBB, s/p BiV-ICD, CAD, HTN, HLD presenting with acute on chronic CHF.     Suspect decompensated heart failure in setting of recent discontinuation of GDMT, diuretic given concern for LAVERNE. Suspect LAVERNE may have been related to sepsis, UTI and that patient has been volume overloaded for several days with HTN exacerbating current episode.    - continue home dobutamine (hold all beta blockers)  - continue entresto 24-26 bid  - transition to lasix 40mg PO BID  - initiate spironolactone 25mg PO daily  - recommend initiation of SGLT2 inhibitor at discharge for mortality benefit  - BMP in 1 week to check renal function  - f/u with HTS clinic (any provider) in 1 week    Ventricular tachycardia  - s/p BIV-ICD  - increase home amiodarone to 400mg daily (appropriate dose)    Coronary artery disease involving native coronary artery of native heart without angina pectoris  - resume antiplatelet agent (aspirin 81mg). He has been alternated between clopidogrel and aspirin but no indication for DAPT  - continue home high intensity statin        VTE Risk Mitigation (From admission, onward)         Ordered     Place sequential compression device  Until discontinued         04/06/22 1110     IP VTE HIGH RISK PATIENT  Once         04/06/22 1110                Tracy Avila MD  Cardiology  [facility] - Cardiology Stepdown

## 2022-04-08 NOTE — PLAN OF CARE
"   04/08/22 1211   Post-Acute Status   Post-Acute Authorization Home Health;IV Infusion   Home Health Status Referrals Sent  (PHN)   IV Infusion Status Set-up Complete/Auth obtained     TALIA confirmed with Marixa with [employer] that pt has everything he needs to resume his home  and therefore is cleared to discharge.  VINITA Thompson notified.  Meanwhile TALIA faxed HH orders to [facility] informing them that pt needs to resume HH care:    Your fax has been successfully sent to [phone] at [phone].  ------------------------------------------------------------  From: [id number]  ------------------------------------------------------------  4/8/2022 12:06:22 PM Transmission Record   Sent to [phone] with remote ID "[email or web address]"   Result: (0/339;0/0) Success   Page record: 1 - 9   Elapsed time: 03:12 on channel 9      Shefali Eagle LMSW  [facility]  [id number]    "

## 2022-04-08 NOTE — PLAN OF CARE
Problem: Glycemic Control Impaired (Sepsis/Septic Shock)  Goal: Blood Glucose Level Within Desired Range  Outcome: Ongoing, Progressing     Problem: Infection Progression (Sepsis/Septic Shock)  Goal: Absence of Infection Signs and Symptoms  Outcome: Ongoing, Progressing     Problem: Nutrition Impaired (Sepsis/Septic Shock)  Goal: Optimal Nutrition Intake  Outcome: Ongoing, Progressing     Problem: Fluid and Electrolyte Imbalance (Acute Kidney Injury/Impairment)  Goal: Fluid and Electrolyte Balance  Outcome: Ongoing, Progressing     Problem: Oral Intake Inadequate (Acute Kidney Injury/Impairment)  Goal: Optimal Nutrition Intake  Outcome: Ongoing, Progressing     Problem: Renal Function Impairment (Acute Kidney Injury/Impairment)  Goal: Effective Renal Function  Outcome: Ongoing, Progressing   Cardiology Step Down. See progress note and flowsheet. Plan: Continue to monitor patient and report any abnormalities in labs, vitals signs, and body system functions to physician as indicated. Patient was given education on their disease process and medications.

## 2022-04-08 NOTE — ASSESSMENT & PLAN NOTE
70 y/o M hx ICM LVEF 15% on palli dobutamine and LBBB, s/p BiV-ICD, CAD, HTN, HLD presenting with acute on chronic CHF.     Suspect decompensated heart failure in setting of recent discontinuation of GDMT, diuretic given concern for LAVERNE. Suspect LAVERNE may have been related to sepsis, UTI and that patient has been volume overloaded for several days with HTN exacerbating current episode.    - continue home dobutamine (hold all beta blockers)  - continue entresto 24-26 bid  - transition to lasix 40mg PO BID  - initiate spironolactone 25mg PO daily  - recommend initiation of SGLT2 inhibitor at discharge for mortality benefit  - BMP in 1 week to check renal function  - f/u with HTS clinic (any provider) in 1 week

## 2022-04-13 PROBLEM — I50.42 CHRONIC COMBINED SYSTOLIC AND DIASTOLIC CONGESTIVE HEART FAILURE: Status: ACTIVE | Noted: 2021-01-19

## 2022-04-13 NOTE — LETTER
April 13, 2022        Elvin Simms Jr.  [address]  [facility] [address]  Phone: [phone]  Fax: [phone]             [id number]  [address]  [facility] [address]  Phone: [phone]   Patient: Audrey Oneil Jr.   MR Number: [id number]   YOB: 1950   Date of Visit: 4/13/2022       Dear Dr. Elvin Smims Jr.    Thank you for referring Audrey Oneil to me for evaluation. Attached you will find relevant portions of my assessment and plan of care.    If you have questions, please do not hesitate to call me. I look forward to following Audrey Oneil along with you.    Sincerely,    Migue Henry Jr, MD    Enclosure    If you would like to receive this communication electronically, please contact [email or web address] or [phone] to request [employer] access.    [employer] is a tool which provides read-only access to select patient information with whom you have a relationship. Its easy to use and provides real time access to review your patients record including encounter summaries, notes, results, and demographic information.    If you feel you have received this communication in error or would no longer like to receive these types of communications, please e-mail [email or web address]

## 2022-04-13 NOTE — PROGRESS NOTES
Subjective:     Patient ID:  Audrey Oneil Jr. is a 71 y.o. male who presents for follow-up of Congestive Heart Failure    HPI:  70 yo WM last seen 7/2/21 refused to consider LVAD at that time.  In Nauary 2022 he developed recurrent VT (he thinks had MI) and cardiogenic shock for which hosp at [facility].  He changed his mind while in extremis about LVAD and I accepted in transfer.  While awaiting hospital bed he was transferred to [facility] and was treated for cardiogenic shock there and subsequently sent home on .  He remains on  and now wants to pursue evaluation for LVAD.  He reports told at [facility] that they could not place IABP due to aneurysm.  He had CT abd and pelvis 4/6/22 and report specifically says no aneurysm.  I told him we would be performing tests for LVAD evaluation so will have another opportunity to assess this possibility.    He is in WC today but able to stand and take few steps before having to sit.  He remains debilitated but is getting stronger gradually.  His wt on 7/2/22 was 238# on our scale and today 210# here.     Limited by weakness and SOB though weakness more.  Cannot tolerate higher doses of meds due to low BP and symptomatic.     Review of Systems   Constitutional: Positive for malaise/fatigue and weight loss. Negative for chills, fever, night sweats and weight gain.   Cardiovascular: Positive for dyspnea on exertion. Negative for chest pain, irregular heartbeat, leg swelling (none now), near-syncope, orthopnea, palpitations, paroxysmal nocturnal dyspnea and syncope.   Respiratory: Positive for cough, sleep disturbances due to breathing ( intolerant to CPAP), snoring and sputum production. Negative for wheezing.    Hematologic/Lymphatic: Bruises/bleeds easily ( bruising).   Musculoskeletal: Positive for back pain, joint pain, muscle weakness and stiffness. Negative for arthritis.   Gastrointestinal: Negative for change in bowel habit, hematochezia and melena.        Colonoscopy last  "performed at the [facility] he is not certain when.  Believes he had a polyp   Genitourinary: Negative for hematuria.   Neurological: Positive for dizziness, light-headedness and weakness. Negative for brief paralysis, focal weakness and seizures.     Objective:   Physical Exam  Constitutional:       General: He is not in acute distress.     Appearance: He is well-developed. He is ill-appearing (chronically ill appearing). He is not diaphoretic.      Comments: BP (!) 83/51 (BP Location: Left arm, Patient Position: Sitting, BP Method: Medium (Automatic))   Pulse 95   Ht 5' 7" (1.702 m)   Wt 95.3 kg (210 lb 1.6 oz)   BMI 32.91 kg/m²   Wt 7/2/22 was 238#   HENT:      Head: Normocephalic and atraumatic.   Eyes:      General: No scleral icterus.        Right eye: No discharge.         Left eye: No discharge.      Conjunctiva/sclera: Conjunctivae normal.   Neck:      Thyroid: No thyromegaly.      Vascular: No JVD.      Trachea: No tracheal deviation.   Cardiovascular:      Rate and Rhythm: Normal rate and regular rhythm.      Heart sounds: Murmur (Grade 1/6 systolic murmur base and LSB) heard.     No gallop.   Pulmonary:      Effort: Pulmonary effort is normal.      Breath sounds: Normal breath sounds.   Abdominal:      General: Bowel sounds are normal. There is no distension.      Palpations: Abdomen is soft.      Tenderness: There is no abdominal tenderness.      Comments: Obese, liver span not palpable; had to examine sitting in WC   Musculoskeletal:         General: No swelling (none today) or tenderness.      Right lower leg: No edema.      Left lower leg: No edema.   Skin:     General: Skin is warm and dry.   Neurological:      General: No focal deficit present.      Mental Status: He is alert and oriented to person, place, and time.      Motor: Weakness present.   Psychiatric:         Mood and Affect: Mood normal.         Behavior: Behavior normal.         Thought Content: Thought content normal.         " Judgment: Judgment normal.        Lab Results   Component Value Date     04/13/2022    K 4.1 04/13/2022    CL 99 04/13/2022    CO2 23 04/13/2022    BUN 21 04/13/2022    CREATININE 1.4 04/13/2022    CALCIUM 9.4 04/13/2022    ANIONGAP 16 04/13/2022    ESTGFRAFRICA 58.8 (A) 04/13/2022    EGFRNONAA 50.9 (A) 04/13/2022     Lab Results   Component Value Date     (H) 04/13/2022    BNP 1,396 (H) 04/06/2022     (H) 03/28/2022       Assessment:     1. Chronic combined systolic and diastolic congestive heart failure    2. Cardiomyopathy, ischemic    3. Coronary artery disease involving native coronary artery of native heart without angina pectoris    4. Ventricular tachycardia    5. Long term current use of amiodarone    6. Obesity (BMI 30.0-34.9)    7. Presence of cardiac resynchronization therapy defibrillator (CRT-D)    8. Hx pulmonary embolism 2010 provoked dvt     9. History of DVT (deep vein thrombosis)      Plan:   Be sure HH set up for weekly lab on   Complete evaluation for LVAD since he is now interested in pursuing  Needs to be seen monthly for now as on  and being evaluated for LVAD  Would like to see him stronger will see if able to get outpatient PT (prefer outpt at center as opposed to in home)  AMIODARONE FOLLOW-UP:   CXR yearly--get soon   CMP every 6 months--get soon   TSH every 6 months--get soon

## 2022-04-14 NOTE — PROGRESS NOTES
Completed a chart review.   Requested a visit with Dr Shin to determine whether pt may undergo evaluation for VAD

## 2022-04-14 NOTE — TELEPHONE ENCOUNTER
4/8/22 am:  Received epic message pt was being d/c from the hospital and they wanted pt to have follow up in on e week with Dr. Esquivel  I noted pt had entered hospital on Dobtuamine and was being d/c with it as well  Also noted Dobutamine was initiated and managed by MD outside of [facility].     4/13/22 pm:  Noted pt seen in HTS clinic by Dr. Esquivel   See his note in regard to initiation of LVAD work up and labs weekly since receiving Dobutamine   Communicated with Dr. Luke informing him pts Dobutamine is being managed by physician outside of [facility], and if he wanted me to transfer that care ( w/ HH and [employer] infusion [facility] ) to his/[facility] care.   Also asked if he wanted pt to continue to follow in the HF section or if he was moving to pre/vad     Dr. Esquivel responded he wants pt followed and managed in the preheart section for vad work up and he asked that Dobutamine and all aspects pertaining to Dobutamine  continue to be managed by outside MD at this time    said if pt continues with lvad he would then have pts home health and dobutamine management transferred to his/ [facility] providers care.   Stated he will send a note to the pre heart transplant nurses regarding section change and his plan.    4/14/22 2:10 pm:  Reviewed chart and dont yet see section change per Dr. Henry.  Will send a message to pre heart nurses letting them know of his plan

## 2022-04-14 NOTE — TELEPHONE ENCOUNTER
Called and notified pt of TTE and appt to see Dr. Shin to discuss possibility of LVAD on May 9, which pt verbalized understanding to.  Appt slip mailed to pt.

## 2022-04-25 NOTE — SUBJECTIVE & OBJECTIVE
Past Medical History:   Diagnosis Date    Acute hypoxemic respiratory failure 11/7/2015    Acute idiopathic gout of left knee 12/2/2019    Acute idiopathic gout of right elbow 9/23/2021    AICD (automatic cardioverter/defibrillator) present 12/13/2015      Anticoagulant long-term use     Bronchopneumonia 12/20/2021    CHF (congestive heart failure)     Chronic anticoagulation 5/5/2016    Chronic combined systolic and diastolic heart failure 11/26/2012    EF 10-20% on ECHO 2013    Chronic gout 12/2/2019    Clotting disorder     Coronary artery disease involving native coronary artery of native heart without angina pectoris 11/26/2012    Cath 10- Stents patent non-obstructive disease Cath 11-12015 non-obstructive disease     COVID-19 08/2021    Had antibody infusion    Diverticulosis of colon     DVT (deep venous thrombosis), unspecified laterality 11/12/2015    Dysphonia 1/28/2018    Essential hypertension 11/15/2015    Fine motor impairment 2/2/2021    Hyperlipidemia     Hypertensive heart disease with heart failure 5/5/2016    MI (myocardial infarction) 2009    x's 5    Nicotine abuse     Obesity 11/26/2012    Olecranon bursitis of right elbow 9/19/2021    Pulmonary embolism 2011    Renal disorder     LAVERNE    Right carpal tunnel syndrome 4/6/2018    Stented coronary artery 11/26/2012    LAD stent placed 10/17/2007     Trigger thumb of right hand 4/6/2018       Past Surgical History:   Procedure Laterality Date    APPENDECTOMY      BACK SURGERY      CARDIAC DEFIBRILLATOR PLACEMENT      CARDIAC SURGERY  2007    stent    CARPAL TUNNEL RELEASE Right 04/2018    INSERTION OF BIVENTRICULAR IMPLANTABLE CARDIOVERTER-DEFIBRILLATOR (ICD) N/A 5/3/2019    Procedure: INSERTION, ICD, BIVENTRICULAR;  Surgeon: Teofilo Pal MD;  Location: [facility] LAB;  Service: Cardiology;  Laterality: N/A;  ICH CM,  ICD UPGD BI-V,  SJM, MAC, FAS, 3PREP (dual ICD INSITU)    r knee scope      REVISION OF SKIN POCKET FOR  CARDIOVERTER-DEFIBRILLATOR Left 5/3/2019    Procedure: Revision, Skin Pocket, For Cardioverter-Defibrillator;  Surgeon: Teofilo Pal MD;  Location: [facility] EP LAB;  Service: Cardiology;  Laterality: Left;    RIGHT HEART CATHETERIZATION Right 6/14/2021    Procedure: INSERTION, CATHETER, RIGHT HEART;  Surgeon: Lizz Nieto MD;  Location: [facility] CATH LAB;  Service: Cardiology;  Laterality: Right;    SPINE SURGERY      TONSILLECTOMY      TRIGGER FINGER RELEASE Right 04/2018    thumb       Review of patient's allergies indicates:   Allergen Reactions    Iodine containing multivitamin Swelling     itching    Keflex [cephalexin] Swelling     Eyes.  Tolerated multiple doses of zosyn and 1 dose of augmentin in 2015 and 2016, respectively    Peaches [peach (prunus persica)] Swelling     eyes    Shellfish containing products Swelling    Fig tree Swelling     itching    Tuberculin spenser test ppd Rash       No current facility-administered medications on file prior to encounter.     Current Outpatient Medications on File Prior to Encounter   Medication Sig    allopurinoL (ZYLOPRIM) 100 MG tablet Take 2 tablets (200 mg total) by mouth once daily.    amiodarone (PACERONE) 200 MG Tab TAKE 1 AND 1/2 TABLETS(300 MG) BY MOUTH EVERY DAY    aspirin (ECOTRIN) 81 MG EC tablet Take 1 tablet (81 mg total) by mouth once daily.    atorvastatin (LIPITOR) 80 MG tablet Take 1 tablet (80 mg total) by mouth once daily.    diphenhydrAMINE (BENADRYL) 25 mg capsule Take 25 mg by mouth as needed for Allergies.    DOBUTamine (DOBUTREX) 500 mg/250 mL (2,000 mcg/mL) 2.5 mcg/kg/min  Patient is 95.7 kg    empagliflozin (JARDIANCE) 25 mg tablet Take 1 tablet (25 mg total) by mouth once daily.    furosemide (LASIX) 40 MG tablet Take 1 tablet (40 mg total) by mouth 2 (two) times a day.    ondansetron (ZOFRAN-ODT) 4 MG TbDL Dissolve 1 tablet (4 mg total) by mouth every 6 (six) hours as needed (nausea).    OPW TEST CLAIM - DO NOT FILL OPW test claim. Do  not fill.    oxyCODONE-acetaminophen (PERCOCET) 5-325 mg per tablet Take 1 tablet by mouth every 6 (six) hours as needed for Pain.    sacubitriL-valsartan (ENTRESTO) 24-26 mg per tablet Take 1 tablet by mouth 2 (two) times daily.    simethicone (MYLICON) 80 MG chewable tablet Take 1 tablet (80 mg total) by mouth every 6 (six) hours as needed for Flatulence. (Patient not taking: Reported on 2022)    spironolactone (ALDACTONE) 25 MG tablet Take 1 tablet (25 mg total) by mouth once daily.    [DISCONTINUED] clopidogreL (PLAVIX) 75 mg tablet Take 1 tablet (75 mg total) by mouth once daily. (Patient not taking: Reported on 3/18/2022)    [DISCONTINUED] colchicine (COLCRYS) 0.6 mg tablet Take 1 tablet (0.6 mg total) by mouth once daily. (Patient not taking: Reported on 3/18/2022)    [DISCONTINUED] ipratropium (ATROVENT) 0.02 % nebulizer solution Take 2.5 mLs (500 mcg total) by nebulization 4 (four) times daily as needed for Wheezing. Rescue (Patient not taking: Reported on 3/29/2022)    [DISCONTINUED] metoprolol succinate (TOPROL-XL) 25 MG 24 hr tablet Take 1 tablet (25 mg total) by mouth once daily.    [DISCONTINUED] midodrine (PROAMATINE) 2.5 MG Tab Take 1 tablet (2.5 mg total) by mouth 3 (three) times daily. HOLD until follow up with cardiology     Family History       Problem Relation (Age of Onset)    Cancer Mother, Paternal Grandmother    Colon polyps Father    Diabetes Mother    Heart disease Mother, Father    No Known Problems Sister, Daughter, Son, Sister, Son    Stroke Father          Tobacco Use    Smoking status: Former Smoker     Packs/day: 1.00     Years: 45.00     Pack years: 45.00     Types: Cigarettes     Quit date: 2005     Years since quittin.3    Smokeless tobacco: Never Used    Tobacco comment: 1-1.5 ppd every day.   Substance and Sexual Activity    Alcohol use: No    Drug use: No    Sexual activity: Never     Review of Systems   Constitutional: Negative for chills and fever.    Cardiovascular:  Negative for chest pain, dyspnea on exertion, irregular heartbeat, near-syncope and syncope.   Respiratory:  Negative for shortness of breath.    Gastrointestinal:  Negative for nausea.   Neurological:  Negative for headaches and weakness.   Psychiatric/Behavioral:  Negative for altered mental status.    Objective:     Vital Signs (Most Recent):  Temp: 98.2 °F (36.8 °C) (04/25/22 1350)  Pulse: 89 (04/25/22 1741)  Resp: 19 (04/25/22 1741)  BP: (!) 97/54 (04/25/22 1643)  SpO2: 98 % (04/25/22 1741)   Vital Signs (24h Range):  Temp:  [98.2 °F (36.8 °C)] 98.2 °F (36.8 °C)  Pulse:  [] 89  Resp:  [15-21] 19  SpO2:  [95 %-100 %] 98 %  BP: ()/(40-59) 97/54        There is no height or weight on file to calculate BMI.    SpO2: 98 %  O2 Device (Oxygen Therapy): room air    No intake or output data in the 24 hours ending 04/25/22 1800    Lines/Drains/Airways       Peripherally Inserted Central Catheter Line  Duration             PICC Double Lumen right basilic -- days              Peripheral Intravenous Line  Duration                  Peripheral IV - Single Lumen 04/25/22 1509 18 G Left Hand <1 day                    Physical Exam  Vitals reviewed.   Constitutional:       Appearance: He is well-developed.   HENT:      Head: Normocephalic and atraumatic.   Eyes:      Pupils: Pupils are equal, round, and reactive to light.   Neck:      Vascular: No JVD.   Cardiovascular:      Heart sounds: No murmur heard.     Comments: VAD hum  Pulmonary:      Effort: Pulmonary effort is normal. No respiratory distress.      Breath sounds: Normal breath sounds.   Abdominal:      General: Bowel sounds are normal. There is no distension.      Palpations: Abdomen is soft.      Tenderness: There is no abdominal tenderness.   Skin:     General: Skin is warm and dry.      Findings: No erythema.   Neurological:      Mental Status: He is alert and oriented to person, place, and time.   Psychiatric:         Behavior:  Behavior normal.         Thought Content: Thought content normal.         Judgment: Judgment normal.       Significant Labs: CMP   Recent Labs   Lab 04/25/22  1508      K 4.6      CO2 17*   *   BUN 36*   CREATININE 1.7*   CALCIUM 9.7   PROT 7.2   ALBUMIN 3.5   BILITOT 0.4   ALKPHOS 81   AST 23   ALT 18   ANIONGAP 18*   ESTGFRAFRICA 46.5*   EGFRNONAA 40.3*    and CBC   Recent Labs   Lab 04/25/22  1508   WBC 7.02   HGB 13.0*   HCT 41.2          Significant Imaging:  All pertinent imaging reviewed

## 2022-04-25 NOTE — ED NOTES
Patient resting in stretcher and is in NAD at this time. Pt is awake alert and oriented x4, BP low - MD aware, respirations even and unlabored. Pt updated on plan of care. Bed low and locked with side rails up x2, call bell in pt reach. Pt voices no needs at this time.  Will continue to monitor.

## 2022-04-25 NOTE — ED NOTES
Patient resting in stretcher and is in NAD at this time. Pt is awake alert and oriented x4, VSS, respirations even and unlabored. Pt updated on plan of care. Bed low and locked with side rails up x2, call bell in pt reach. Pt voices no needs at this time, visitor at bedside.  Will continue to monitor.

## 2022-04-25 NOTE — ED TRIAGE NOTES
Audrey Oneil ., a 71 y.o. male presents to the ED w/ complaint of weakness. Pt states that he spit up light red blood and that there wasn't much - pt is on blood thinners. Pt reports feeling dizzy/lightheaded, n/v. Denies SOB, chest pain, fever/chills. Pt is on continuous dobumatine infusion at home. Pt reports feeling numbness in R leg.     Triage note:  Chief Complaint   Patient presents with    Weakness     Called EMS because he was spitting up blood. Complaining of weakness. On a dobutamine pump, hx CKD     Review of patient's allergies indicates:   Allergen Reactions    Iodine containing multivitamin Swelling     itching    Keflex [cephalexin] Swelling     Eyes.  Tolerated multiple doses of zosyn and 1 dose of augmentin in 2015 and 2016, respectively    Peaches [peach (prunus persica)] Swelling     eyes    Shellfish containing products Swelling    Fig tree Swelling     itching    Tuberculin spenser test ppd Rash     Past Medical History:   Diagnosis Date    Acute hypoxemic respiratory failure 11/7/2015    Acute idiopathic gout of left knee 12/2/2019    Acute idiopathic gout of right elbow 9/23/2021    AICD (automatic cardioverter/defibrillator) present 12/13/2015      Anticoagulant long-term use     Bronchopneumonia 12/20/2021    CHF (congestive heart failure)     Chronic anticoagulation 5/5/2016    Chronic combined systolic and diastolic heart failure 11/26/2012    EF 10-20% on ECHO 2013    Chronic gout 12/2/2019    Clotting disorder     Coronary artery disease involving native coronary artery of native heart without angina pectoris 11/26/2012    Cath 10- Stents patent non-obstructive disease Cath 11-12015 non-obstructive disease     COVID-19 08/2021    Had antibody infusion    Diverticulosis of colon     DVT (deep venous thrombosis), unspecified laterality 11/12/2015    Dysphonia 1/28/2018    Essential hypertension 11/15/2015    Fine motor impairment 2/2/2021     Hyperlipidemia     Hypertensive heart disease with heart failure 5/5/2016    MI (myocardial infarction) 2009    x's 5    Nicotine abuse     Obesity 11/26/2012    Olecranon bursitis of right elbow 9/19/2021    Pulmonary embolism 2011    Renal disorder     LAVERNE    Right carpal tunnel syndrome 4/6/2018    Stented coronary artery 11/26/2012    LAD stent placed 10/17/2007     Trigger thumb of right hand 4/6/2018     LOC: The patient is awake, alert, and oriented to self, place, time, and situation. Pt is calm and cooperative. Affect is appropriate.  Speech is appropriate and clear.     APPEARANCE: Patient resting comfortably in no acute distress.  Patient is clean and well groomed.    SKIN: The skin is warm and dry; color consistent with ethnicity.  Patient has normal skin turgor and moist mucus membranes.  Skin intact; no breakdown or bruising noted.     MUSCULOSKELETAL: Patient moving upper and lower extremities without difficulty; denies pain in the extremities or back.  Reports weakness.     RESPIRATORY: Airway is open and patent. Respirations spontaneous, even, easy, and non-labored.  Patient has a normal effort and rate.  No accessory muscle use noted. Denies cough.     CARDIAC:  Normal rate noted.  No peripheral edema noted. No complaints of chest pain.      ABDOMEN: Soft and non tender to palpation.  Distention noted. Pt denies abdominal pain; reports nausea, vomiting; denies diarrhea, or constipation.    NEUROLOGIC: Eyes open spontaneously.  Behavior appropriate to situation.  Follows commands; facial expression symmetrical.  Purposeful motor response noted; reports numbness/tingling in R leg. Pt denies headache; reports lightheadedness or dizziness; denies visual disturbances; denies loss of balance; denies unilateral weakness.

## 2022-04-25 NOTE — PROGRESS NOTES
Inpatient device interrogation and/or reprogramming orders received; the industry representative was contacted and provided with patient's name and room number.        Discharged

## 2022-04-25 NOTE — ASSESSMENT & PLAN NOTE
- likely due to dehydration and vasovagal in the setting of dehydration, poor oral intake   - on device check: no arrhythmias  - BP at baseline  - labs reviewed, no reversible etiology identified  - recommend follow up in clinic with Dr. Henry  - encouraged low salt diet and routine exercise  - encouraged keep log of BP at home

## 2022-04-25 NOTE — ED PROVIDER NOTES
Encounter Date: 4/25/2022       History     Chief Complaint   Patient presents with    Weakness     Called EMS because he was spitting up blood. Complaining of weakness. On a dobutamine pump, hx CKD     Mr. Oneil is a 70 yo M with pmhx CAD, HFrEF (10-20%) on chronic dobutamine gtt s/p AICD, h/o PE, HTN, HLD who presents to the emergency department due to generalized weakness, dizziness and fatigue.  He states that he had had a bowel movement this morning and when he tried to get up from the toilet he suddenly felt extremely dizzy. He also stated that he was feeling weak all over his body.  He was so weak that he was unable to help himself off the toilet and needed family to help. They were concerned because of his multiple medical issues and so they wanted him to come to the emergency department for evaluation. He states that when EMS arrived they told him that his systolic blood pressure was in the 60s.        Review of patient's allergies indicates:   Allergen Reactions    Iodine containing multivitamin Swelling     itching    Keflex [cephalexin] Swelling     Eyes.  Tolerated multiple doses of zosyn and 1 dose of augmentin in 2015 and 2016, respectively    Peaches [peach (prunus persica)] Swelling     eyes    Shellfish containing products Swelling    Fig tree Swelling     itching    Tuberculin spenser test ppd Rash     Past Medical History:   Diagnosis Date    Acute hypoxemic respiratory failure 11/7/2015    Acute idiopathic gout of left knee 12/2/2019    Acute idiopathic gout of right elbow 9/23/2021    AICD (automatic cardioverter/defibrillator) present 12/13/2015      Anticoagulant long-term use     Bronchopneumonia 12/20/2021    CHF (congestive heart failure)     Chronic anticoagulation 5/5/2016    Chronic combined systolic and diastolic heart failure 11/26/2012    EF 10-20% on ECHO 2013    Chronic gout 12/2/2019    Clotting disorder     Coronary artery disease involving native  coronary artery of native heart without angina pectoris 11/26/2012    Cath 10- Stents patent non-obstructive disease Cath 11-12015 non-obstructive disease     COVID-19 08/2021    Had antibody infusion    Diverticulosis of colon     DVT (deep venous thrombosis), unspecified laterality 11/12/2015    Dysphonia 1/28/2018    Essential hypertension 11/15/2015    Fine motor impairment 2/2/2021    Hyperlipidemia     Hypertensive heart disease with heart failure 5/5/2016    MI (myocardial infarction) 2009    x's 5    Nicotine abuse     Obesity 11/26/2012    Olecranon bursitis of right elbow 9/19/2021    Pulmonary embolism 2011    Renal disorder     LAVERNE    Right carpal tunnel syndrome 4/6/2018    Stented coronary artery 11/26/2012    LAD stent placed 10/17/2007     Trigger thumb of right hand 4/6/2018     Past Surgical History:   Procedure Laterality Date    APPENDECTOMY      BACK SURGERY      CARDIAC DEFIBRILLATOR PLACEMENT      CARDIAC SURGERY  2007    stent    CARPAL TUNNEL RELEASE Right 04/2018    INSERTION OF BIVENTRICULAR IMPLANTABLE CARDIOVERTER-DEFIBRILLATOR (ICD) N/A 5/3/2019    Procedure: INSERTION, ICD, BIVENTRICULAR;  Surgeon: Teofilo Pal MD;  Location: [facility] EP LAB;  Service: Cardiology;  Laterality: N/A;  ICH CM,  ICD UPGD BI-V,  SJM, MAC, FAS, 3PREP (dual ICD INSITU)    r knee scope      REVISION OF SKIN POCKET FOR CARDIOVERTER-DEFIBRILLATOR Left 5/3/2019    Procedure: Revision, Skin Pocket, For Cardioverter-Defibrillator;  Surgeon: Teofilo Pal MD;  Location: [facility] EP LAB;  Service: Cardiology;  Laterality: Left;    RIGHT HEART CATHETERIZATION Right 6/14/2021    Procedure: INSERTION, CATHETER, RIGHT HEART;  Surgeon: Lizz Nieto MD;  Location: [facility] CATH LAB;  Service: Cardiology;  Laterality: Right;    SPINE SURGERY      TONSILLECTOMY      TRIGGER FINGER RELEASE Right 04/2018    thumb     Family History   Problem Relation Age of Onset    Cancer Mother          colon cancer    Heart disease Mother     Diabetes Mother     Heart disease Father     Stroke Father     Colon polyps Father     Cancer Paternal Grandmother         leukemia    No Known Problems Sister     No Known Problems Daughter     No Known Problems Son     No Known Problems Sister     No Known Problems Son      Social History     Tobacco Use    Smoking status: Former Smoker     Packs/day: 1.00     Years: 45.00     Pack years: 45.00     Types: Cigarettes     Quit date: 2005     Years since quittin.3    Smokeless tobacco: Never Used    Tobacco comment: 1-1.5 ppd every day.   Substance Use Topics    Alcohol use: No    Drug use: No     Review of Systems   Constitutional: Positive for fatigue. Negative for activity change, appetite change, chills, diaphoresis, fever and unexpected weight change.   HENT: Negative for congestion, sinus pressure and sinus pain.    Eyes: Negative for photophobia and visual disturbance.   Respiratory: Negative for cough, chest tightness, shortness of breath and wheezing.    Cardiovascular: Negative for chest pain, palpitations and leg swelling.   Gastrointestinal: Negative for abdominal pain, constipation, diarrhea, nausea and vomiting.   Genitourinary: Negative for difficulty urinating, dysuria and flank pain.   Musculoskeletal: Negative for arthralgias, back pain, gait problem, joint swelling, myalgias and neck stiffness.   Skin: Negative for color change and wound.   Neurological: Negative for dizziness, seizures, weakness, light-headedness, numbness and headaches.   Psychiatric/Behavioral: Negative for agitation, confusion and decreased concentration. The patient is not nervous/anxious.        Physical Exam     Initial Vitals [22 1350]   BP Pulse Resp Temp SpO2   (!) 70/40 93 18 98.2 °F (36.8 °C) 97 %      MAP       --         Physical Exam    Nursing note and vitals reviewed.  Constitutional: He appears well-developed and well-nourished. He is not  diaphoretic. No distress.   HENT:   Head: Normocephalic and atraumatic.   Mouth/Throat: Oropharynx is clear and moist. No oropharyngeal exudate.   Eyes: Conjunctivae and EOM are normal. Pupils are equal, round, and reactive to light. Right eye exhibits no discharge. Left eye exhibits no discharge. No scleral icterus.   Neck: No tracheal deviation present. No JVD present.   Normal range of motion.  Cardiovascular: Normal rate, normal heart sounds and intact distal pulses. Exam reveals no gallop.    No murmur heard.  Pulmonary/Chest: Breath sounds normal. No respiratory distress. He has no wheezes. He exhibits no tenderness.   Abdominal: Abdomen is soft. Bowel sounds are normal. He exhibits no distension. There is no abdominal tenderness. There is no guarding.   Musculoskeletal:         General: No tenderness or edema. Normal range of motion.      Cervical back: Normal range of motion.     Neurological: He is alert and oriented to person, place, and time. He has normal strength. No cranial nerve deficit or sensory deficit.   Skin: Skin is warm. Capillary refill takes less than 2 seconds. No erythema.   Psychiatric: He has a normal mood and affect.         ED Course   Procedures  Labs Reviewed   CBC W/ AUTO DIFFERENTIAL - Abnormal; Notable for the following components:       Result Value    Hemoglobin 13.0 (*)     MCHC 31.6 (*)     RDW 15.6 (*)     All other components within normal limits   COMPREHENSIVE METABOLIC PANEL - Abnormal; Notable for the following components:    CO2 17 (*)     Glucose 112 (*)     BUN 36 (*)     Creatinine 1.7 (*)     Anion Gap 18 (*)     eGFR if  46.5 (*)     eGFR if non  40.3 (*)     All other components within normal limits   URINALYSIS, REFLEX TO URINE CULTURE - Abnormal; Notable for the following components:    Glucose, UA 1+ (*)     All other components within normal limits    Narrative:     Specimen Source->Urine   TROPONIN I - Abnormal; Notable for the  following components:    Troponin I 0.088 (*)     All other components within normal limits   B-TYPE NATRIURETIC PEPTIDE - Abnormal; Notable for the following components:     (*)     All other components within normal limits   PHOSPHORUS - Abnormal; Notable for the following components:    Phosphorus 4.6 (*)     All other components within normal limits   TROPONIN I - Abnormal; Notable for the following components:    Troponin I 0.094 (*)     All other components within normal limits   ISTAT PROCEDURE - Abnormal; Notable for the following components:    POC Glucose 119 (*)     POC BUN 36 (*)     POC Creatinine 1.7 (*)     POC Sodium 135 (*)     POC TCO2 (MEASURED) 19 (*)     All other components within normal limits   PROTIME-INR   MAGNESIUM   LACTIC ACID, PLASMA   SARS-COV-2 RDRP GENE    Narrative:     This test utilizes isothermal nucleic acid amplification   technology to detect the SARS-CoV-2 RdRp nucleic acid segment.   The analytical sensitivity (limit of detection) is 125 genome   equivalents/mL.   A POSITIVE result implies infection with the SARS-CoV-2 virus;   the patient is presumed to be contagious.     A NEGATIVE result means that SARS-CoV-2 nucleic acids are not   present above the limit of detection. A NEGATIVE result should be   treated as presumptive. It does not rule out the possibility of   COVID-19 and should not be the sole basis for treatment decisions.   If COVID-19 is strongly suspected based on clinical and exposure   history, re-testing using an alternate molecular assay should be   considered.   This test is only for use under the Food and Drug   Administration s Emergency Use Authorization (EUA).   Commercial kits are provided by [facility].   Performance characteristics of the EUA have been independently   verified by [facility] Department of   Pathology and Laboratory Medicine.   _________________________________________________________________   The authorized  Fact Sheet for Healthcare Providers and the authorized Fact   Sheet for Patients of the ID NOW COVID-19 are available on the FDA   website:     https://www.fda.gov/media/987795/download  https://www.fda.gov/media/544972/download          TYPE & SCREEN   POCT GLUCOSE MONITORING CONTINUOUS   ISTAT CHEM8        ECG Results          EKG 12-lead (Final result)  Result time 04/25/22 15:55:30    Final result by Interface, Lab In [facility] (04/25/22 15:55:30)                 Narrative:    Test Reason : R53.1,    Vent. Rate : 094 BPM     Atrial Rate : 094 BPM     P-R Int : 166 ms          QRS Dur : 148 ms      QT Int : 424 ms       P-R-T Axes : 031 157 -12 degrees     QTc Int : 530 ms    Atrial-sensed ventricular-paced rhythm  Abnormal ECG  When compared with ECG of 06-APR-2022 04:01,  No significant change was found  Confirmed by Jacinto Lynn MD ([id number]) on 4/25/2022 3:55:24 PM    Referred By: [email or web address]   SELF           Confirmed By:Jacinto Lynn MD                            Imaging Results          X-Ray Chest AP Portable (Final result)  Result time 04/25/22 15:43:45    Final result by Devendra Clark MD (04/25/22 15:43:45)                 Impression:      Cardiac device without radiographic evidence of failure, pneumonia or other source of acute shortness of breath, noting that early/mild viral pneumonia may be radiographically occult.      Electronically signed by: Devendra Clark MD  Date:    04/25/2022  Time:    15:43             Narrative:    EXAMINATION:  XR CHEST AP PORTABLE    CLINICAL HISTORY:  Shortness of breath    TECHNIQUE:  Single frontal view of the chest was performed.    COMPARISON:  Chest radiograph 04/06/2022 and CT thorax 12/12/2021    FINDINGS:  Monitoring leads overlie the chest.  Patient is rotated.  Large body habitus.    Right-sided PICC line and left chest cardiac device appear stable.  Cardiomediastinal silhouette is midline and similar to prior.  There is overall improved aeration of the bilateral  lung bases with minimal residual linear opacities suggesting subsegmental scarring versus atelectasis.  Medial left lung base Bochdalek's type hernia unchanged.  Pulmonary vasculature and hilar contours are within normal limits.  The lungs are otherwise well expanded without large consolidation, pleural effusion or pneumothorax.  No acute osseous process seen.  PA and lateral views can be obtained.                                 Medications - No data to display  Medical Decision Making:   Initial Assessment:   Mr. Oneil is a 72 yo M with pmhx CAD, HFrEF (10-20%) on chronic dobutamine gtt s/p AICD, h/o PE, HTN, HLD who presents to the emergency department due to generalized weakness, dizziness and fatigue.  Differential Diagnosis:   -Vasovagal hypotension  -ACS  -Aortic Dissection  -Cardiac Tamponade  -CAD        Clinical Tests:   Lab Tests: Ordered and Reviewed  Radiological Study: Reviewed and Ordered  ED Management:  Patient was examined,   EKG did not show any findings concerning for STEMI but given patient's risk factors and cardiac history I decided to workup the patient for ACS.  Labs were obtained including a CBC which was non-significant, CMP was non-significant, urinalysis did not show any signs of infection.   Troponin was mildly elevated and a repeat troponin was done which was slightly higher.   Patient was re-evaluated and he reported symptomatic improvement.   Discussion was had with cardiology who evaluated the patient and stated that they were not concerned about patient's hypotension and they felt that he could follow up with them outpatient.  He was discharged in stable condition and return precautions were given.                           Clinical Impression:   Final diagnoses:  [R53.1] Weakness  [I95.9] Hypotension, unspecified hypotension type (Primary)          ED Disposition Condition    Discharge Stable        ED Prescriptions     None        Follow-up Information     Follow up With  Specialties Details Why Contact Info Additional Information    January Khan MD Internal Medicine Schedule an appointment as soon as possible for a visit in 3 days  [address]  [place] [address]  [phone]       [facility] - Cardiology - [facility] Cardiology Schedule an appointment as soon as possible for a visit in 3 days  [address] [facility] [id number] [phone] Cardiology Services Clinics - 3rd floor           Kin Thao MD  Resident  04/25/22 2002

## 2022-04-25 NOTE — HPI
Audrey Oneil is a 72 y/o M with ICM (EF 15%), s/p St-Rodri CRT-D 5/2019, HTN, HLD, CAD s/p MI who presents for syncope.     Hx of PEA arrest 12/2021 at [facility] with significant pressor requirement and attempted Impella (aborted due to significant PAD and tortuous aorta). He also required HD during the hospitalization. He was discharged to an LTAC facility. He is currently off HD and is no longer ventilator-dependent. He remains on a dopamine drip.    In the ER, initial BP 70/40 and HR 93 bpm, with subsequent /56, HR 85 bpm. Pertinent labs include CBC wnl, BUN/Cr 36/1.7, , trop 0.08. CXR wnl. UA wnl.    In the past, he was not a candidate for LVAD, but has recently changed his mind when seen in clinic by .     TTE 9/21/21  ·The left ventricle is severely enlarged with eccentric hypertrophy and severely decreased systolic function. The estimated ejection fraction is 15%.  Mild right ventricular enlargement with normal right ventricular systolic function.  ·Grade I left ventricular diastolic dysfunction.  ·Normal central venous pressure (3 mmHg).  ·No evidence of significant valvular abnormalities or intracardiac vegetation.

## 2022-04-25 NOTE — ED NOTES
I-STAT Chem-8+ Results:   Value Reference Range   Sodium 135 136-145 mmol/L   Potassium  4.3 3.5-5.1 mmol/L   Chloride 103  mmol/L   Ionized Calcium 1.14 1.06-1.42 mmol/L   CO2 (measured) 19 23-29 mmol/L   Glucose 119  mg/dL   BUN 36 6-30 mg/dL   Creatinine 1.7 0.5-1.4 mg/dL   Hematocrit 42 36-54%

## 2022-04-25 NOTE — ED NOTES
Pt care assumed at this time. Patient resting in stretcher and is in NAD at this time. Pt is awake alert and oriented x4, BP low - MD aware, respirations even and unlabored. Pt updated on plan of care. Bed low and locked with side rails up x2, call bell in pt reach. Pt voices no needs at this time.  Will continue to monitor.

## 2022-04-25 NOTE — CONSULTS
[facility] - Emergency Dept  Cardiology  Consult Note    Patient Name: Audrey Oneil Jr.  MRN: [id number]  Admission Date: 4/25/2022  Hospital Length of Stay: 0 days  Code Status: Prior   Attending Provider: Debbie Martinez MD   Consulting Provider: Paola Hale MD  Primary Care Physician: January Khan MD  Principal Problem:<principal problem not specified>    Patient information was obtained from patient, past medical records and ER records.     Inpatient consult to Cardiology  Consult performed by: Paola Hale MD  Consult ordered by: Kin Thao MD        Subjective:     Chief Complaint:  presyncope     HPI:   Audrey Oneil is a 70 y/o M with ICM (EF 15%) on home  2.5, s/p [facility] CRT-D 5/2019, HTN, HLD, CAD s/p MI who presents for pre-syncope.     Pt reports when he was getting up from the toilet, he felt acute onset of light headedness and dizziness. He did not lose consciousness and did not fall. He felt weak and needed assistance to get up. He was brought to the ER by EMS given concern for his co morbidities.    In the ER, initial BP 70/40 and HR 93 bpm, with subsequent /56, HR 85 bpm without any fluids or corrections. Pertinent labs include CBC wnl, BUN/Cr 36/1.7, , trop 0.08. CXR wnl. UA wnl. Pt reports he has intentionally not been eating well or drinking in attempts to lose weight so he can qualify for a LVAD. He has lost 13 lbs. In the past week, no chest pain, SOB, orthopnea, leg swelling, palpitations. No shocks from his ICD device. Bedside echo suggestive of EF 15-20% with global hypokinesis. IVC is small and collpasible suggestive RA pressure 3.    Hx of PEA arrest, VT arrest, cardiogenic shock 12/2021-1/2022 at [facility]. At this time, he remains on a dobutamine drip and is interested in pursuing work up for LVAD and follows with Dr. Henry in clinic.     TTE 9/21/21  ·The left ventricle is severely enlarged with eccentric hypertrophy and severely decreased systolic  function. The estimated ejection fraction is 15%.  Mild right ventricular enlargement with normal right ventricular systolic function.  ·Grade I left ventricular diastolic dysfunction.  ·Normal central venous pressure (3 mmHg).  ·No evidence of significant valvular abnormalities or intracardiac vegetation.    Past Medical History:   Diagnosis Date    Acute hypoxemic respiratory failure 11/7/2015    Acute idiopathic gout of left knee 12/2/2019    Acute idiopathic gout of right elbow 9/23/2021    AICD (automatic cardioverter/defibrillator) present 12/13/2015      Anticoagulant long-term use     Bronchopneumonia 12/20/2021    CHF (congestive heart failure)     Chronic anticoagulation 5/5/2016    Chronic combined systolic and diastolic heart failure 11/26/2012    EF 10-20% on ECHO 2013    Chronic gout 12/2/2019    Clotting disorder     Coronary artery disease involving native coronary artery of native heart without angina pectoris 11/26/2012    Cath 10- Stents patent non-obstructive disease Cath 11-12015 non-obstructive disease     COVID-19 08/2021    Had antibody infusion    Diverticulosis of colon     DVT (deep venous thrombosis), unspecified laterality 11/12/2015    Dysphonia 1/28/2018    Essential hypertension 11/15/2015    Fine motor impairment 2/2/2021    Hyperlipidemia     Hypertensive heart disease with heart failure 5/5/2016    MI (myocardial infarction) 2009    x's 5    Nicotine abuse     Obesity 11/26/2012    Olecranon bursitis of right elbow 9/19/2021    Pulmonary embolism 2011    Renal disorder     LAVERNE    Right carpal tunnel syndrome 4/6/2018    Stented coronary artery 11/26/2012    LAD stent placed 10/17/2007     Trigger thumb of right hand 4/6/2018       Past Surgical History:   Procedure Laterality Date    APPENDECTOMY      BACK SURGERY      CARDIAC DEFIBRILLATOR PLACEMENT      CARDIAC SURGERY  2007    stent    CARPAL TUNNEL RELEASE Right 04/2018     INSERTION OF BIVENTRICULAR IMPLANTABLE CARDIOVERTER-DEFIBRILLATOR (ICD) N/A 5/3/2019    Procedure: INSERTION, ICD, BIVENTRICULAR;  Surgeon: Teofilo Pal MD;  Location: [facility] EP LAB;  Service: Cardiology;  Laterality: N/A;  ICH CM,  ICD UPGD BI-V,  SJM, MAC, FAS, 3PREP (dual ICD INSITU)    r knee scope      REVISION OF SKIN POCKET FOR CARDIOVERTER-DEFIBRILLATOR Left 5/3/2019    Procedure: Revision, Skin Pocket, For Cardioverter-Defibrillator;  Surgeon: Teofilo Pal MD;  Location: [facility] EP LAB;  Service: Cardiology;  Laterality: Left;    RIGHT HEART CATHETERIZATION Right 6/14/2021    Procedure: INSERTION, CATHETER, RIGHT HEART;  Surgeon: Lizz Nieto MD;  Location: [facility] CATH LAB;  Service: Cardiology;  Laterality: Right;    SPINE SURGERY      TONSILLECTOMY      TRIGGER FINGER RELEASE Right 04/2018    thumb       Review of patient's allergies indicates:   Allergen Reactions    Iodine containing multivitamin Swelling     itching    Keflex [cephalexin] Swelling     Eyes.  Tolerated multiple doses of zosyn and 1 dose of augmentin in 2015 and 2016, respectively    Peaches [peach (prunus persica)] Swelling     eyes    Shellfish containing products Swelling    Fig tree Swelling     itching    Tuberculin spenser test ppd Rash       No current facility-administered medications on file prior to encounter.     Current Outpatient Medications on File Prior to Encounter   Medication Sig    allopurinoL (ZYLOPRIM) 100 MG tablet Take 2 tablets (200 mg total) by mouth once daily.    amiodarone (PACERONE) 200 MG Tab TAKE 1 AND 1/2 TABLETS(300 MG) BY MOUTH EVERY DAY    aspirin (ECOTRIN) 81 MG EC tablet Take 1 tablet (81 mg total) by mouth once daily.    atorvastatin (LIPITOR) 80 MG tablet Take 1 tablet (80 mg total) by mouth once daily.    diphenhydrAMINE (BENADRYL) 25 mg capsule Take 25 mg by mouth as needed for Allergies.    DOBUTamine (DOBUTREX) 500 mg/250 mL (2,000 mcg/mL) 2.5 mcg/kg/min  Patient is  95.7 kg    empagliflozin (JARDIANCE) 25 mg tablet Take 1 tablet (25 mg total) by mouth once daily.    furosemide (LASIX) 40 MG tablet Take 1 tablet (40 mg total) by mouth 2 (two) times a day.    ondansetron (ZOFRAN-ODT) 4 MG TbDL Dissolve 1 tablet (4 mg total) by mouth every 6 (six) hours as needed (nausea).    OPW TEST CLAIM - DO NOT FILL OPW test claim. Do not fill.    oxyCODONE-acetaminophen (PERCOCET) 5-325 mg per tablet Take 1 tablet by mouth every 6 (six) hours as needed for Pain.    sacubitriL-valsartan (ENTRESTO) 24-26 mg per tablet Take 1 tablet by mouth 2 (two) times daily.    simethicone (MYLICON) 80 MG chewable tablet Take 1 tablet (80 mg total) by mouth every 6 (six) hours as needed for Flatulence. (Patient not taking: Reported on 4/13/2022)    spironolactone (ALDACTONE) 25 MG tablet Take 1 tablet (25 mg total) by mouth once daily.    [DISCONTINUED] clopidogreL (PLAVIX) 75 mg tablet Take 1 tablet (75 mg total) by mouth once daily. (Patient not taking: Reported on 3/18/2022)    [DISCONTINUED] colchicine (COLCRYS) 0.6 mg tablet Take 1 tablet (0.6 mg total) by mouth once daily. (Patient not taking: Reported on 3/18/2022)    [DISCONTINUED] ipratropium (ATROVENT) 0.02 % nebulizer solution Take 2.5 mLs (500 mcg total) by nebulization 4 (four) times daily as needed for Wheezing. Rescue (Patient not taking: Reported on 3/29/2022)    [DISCONTINUED] metoprolol succinate (TOPROL-XL) 25 MG 24 hr tablet Take 1 tablet (25 mg total) by mouth once daily.    [DISCONTINUED] midodrine (PROAMATINE) 2.5 MG Tab Take 1 tablet (2.5 mg total) by mouth 3 (three) times daily. HOLD until follow up with cardiology     Family History       Problem Relation (Age of Onset)    Cancer Mother, Paternal Grandmother    Colon polyps Father    Diabetes Mother    Heart disease Mother, Father    No Known Problems Sister, Daughter, Son, Sister, Son    Stroke Father          Tobacco Use    Smoking status: Former Smoker      Packs/day: 1.00     Years: 45.00     Pack years: 45.00     Types: Cigarettes     Quit date: 2005     Years since quittin.3    Smokeless tobacco: Never Used    Tobacco comment: 1-1.5 ppd every day.   Substance and Sexual Activity    Alcohol use: No    Drug use: No    Sexual activity: Never     Review of Systems   Constitutional: Negative for chills and fever.   Cardiovascular:  Negative for chest pain, dyspnea on exertion, irregular heartbeat, near-syncope and syncope.   Respiratory:  Negative for shortness of breath.    Gastrointestinal:  Negative for nausea.   Neurological:  Negative for headaches and weakness.   Psychiatric/Behavioral:  Negative for altered mental status.    Objective:     Vital Signs (Most Recent):  Temp: 98.2 °F (36.8 °C) (22 1350)  Pulse: 89 (22 1741)  Resp: 19 (22 1741)  BP: (!) 97/54 (22 1643)  SpO2: 98 % (22 1741)   Vital Signs (24h Range):  Temp:  [98.2 °F (36.8 °C)] 98.2 °F (36.8 °C)  Pulse:  [] 89  Resp:  [15-21] 19  SpO2:  [95 %-100 %] 98 %  BP: ()/(40-59) 97/54     SpO2: 98 %  O2 Device (Oxygen Therapy): room air      Physical Exam  Vitals reviewed.   Constitutional:       Appearance: He is well-developed.   HENT:      Head: Normocephalic and atraumatic.   Eyes:      Pupils: Pupils are equal, round, and reactive to light.   Neck:      Vascular: No JVD.   Cardiovascular:      Heart sounds: No murmur heard.  Pulmonary:      Effort: Pulmonary effort is normal. No respiratory distress.      Breath sounds: Normal breath sounds.   Abdominal:      General: Bowel sounds are normal. There is no distension.      Palpations: Abdomen is soft.      Tenderness: There is no abdominal tenderness.   Skin:     General: Skin is warm and dry.      Findings: No erythema.   Neurological:      Mental Status: He is alert and oriented to person, place, and time.   Psychiatric:         Behavior: Behavior normal.         Thought Content: Thought content  normal.         Judgment: Judgment normal.       Significant Labs: CMP   Recent Labs   Lab 04/25/22  1508      K 4.6      CO2 17*   *   BUN 36*   CREATININE 1.7*   CALCIUM 9.7   PROT 7.2   ALBUMIN 3.5   BILITOT 0.4   ALKPHOS 81   AST 23   ALT 18   ANIONGAP 18*   ESTGFRAFRICA 46.5*   EGFRNONAA 40.3*    and CBC   Recent Labs   Lab 04/25/22  1508   WBC 7.02   HGB 13.0*   HCT 41.2          Significant Imaging:  All pertinent imaging reviewed    Assessment and Plan:     Postural dizziness with presyncope  - likely due to dehydration and vasovagal in the setting of dehydration, poor oral intake   - on device check: no arrhythmias  - labs reviewed, no reversible etiology identified  - on bedside echo, EF unchanged and IVC suggests RA pressure 3 consistent with hypovolemic state  - BP at (his) baseline. Encouraged keep log of BP at home  - encouraged continue weight loss via low salt diet and routine exercise  - pt okay to be discharged from cardiac standpoint. Recommend follow up in clinic with Dr. Henry    Ischemic cardiomyopathy  Chronic heart failure with reduced ejection fraction  CRT-D in place  - pt is hypovolemic on exam; continue entresto 24/26 BID, aldactone 25 mg daily, empagliflozin  - on home dobutamine 2.5 mcg/kg/min  - no angina; continue aspirin, plavix    H/o ventricular tachycardia  CRT-D in place  -device interrogated: no arrhythmias   - continue amiodarone, follow up with Dr. Gama     Stage 3 chronic kidney disease  - Cr has been labile but likely at baseline; continue meds as prescribed    Thank you for your consult. I will sign off. Please contact us if you have any additional questions.    Paola Hale MD  Cardiology   [facility] - Emergency Dept  eng

## 2022-04-26 NOTE — DISCHARGE INSTRUCTIONS
Thank you for visiting us Today!    Please follow-up with your cardiologist concerning your visit.

## 2022-04-26 NOTE — ED NOTES
Patient resting in stretcher and is in NAD at this time. Pt is awake alert and oriented x4, VSS, respirations even and unlabored. Pt updated on plan of care. Bed low and locked with side rails up x2, call bell in pt reach. Pt voices no needs at this time, wife at bedside.  Will continue to monitor.

## 2022-04-28 NOTE — PROGRESS NOTES
"INTERNAL MEDICINE PROGRESS NOTE    CHIEF COMPLAINT     Chief Complaint   Patient presents with    Hospital Follow Up       HPI     Audrey Oneil Jr. is a 71 y.o. male with CAD, HFrEF (10-20%) on chronic dobutamine gtt s/p AICD, h/o PE, HTN, HLD who presents for a follow up visit today.    Was seen in ED 4/25/2022 for generalized weakness, dizziness and fatigue. Started after having BM and standing up from toilet He also stated that he was feeling weak all over his body.  He was so weak that he was unable to help himself off the toilet and needed family to help. They were concerned because of his multiple medical issues and so they wanted him to come to the emergency department for evaluation. He states that when EMS arrived they told him that his systolic blood pressure was in the 60s.    "EKG did not show any findings concerning for STEMI but given patient's risk factors and cardiac history I decided to workup the patient for ACS.  Labs were obtained including a CBC which was non-significant, CMP was non-significant, urinalysis did not show any signs of infection.   Troponin was mildly elevated and a repeat troponin was done which was slightly higher.   Patient was re-evaluated and he reported symptomatic improvement.   Discussion was had with cardiology who evaluated the patient and stated that they were not concerned about patient's hypotension and they felt that he could follow up with them outpatient"  DID NOT SCHEDULE W CARDIOLOGY     Here for ED follow up - still having weakness and dizziness. BP remains low - 75/66 and . In office 80/60   Reports this problem has been present for weeks.   Continues to take lasix 40mg bid, aldactone 25mg daily entresto bid and amiodarone     Cardiomyopathy with CHF- followed by cardiology - has dobutamine drip - inquiring about LVAD   Daily weights - 215 yesterday 205 this morning   At hospital discharge was 224    Has decreased fluid intake to 500ml total and is " having decrased appetite     Transitional Care Note    Family and/or Caretaker present at visit?  Yes.  Diagnostic tests reviewed/disposition: I have reviewed all completed as well as pending diagnostic tests at the time of discharge.  Disease/illness education: yes  Home health/community services discussion/referrals: Patient does not have home health established from hospital visit.  They do not need home health.  If needed, we will set up home health for the patient.   Establishment or re-establishment of referral orders for community resources: No other necessary community resources.   Discussion with other health care providers: No discussion with other health care providers necessary.             Past Medical History:  Past Medical History:   Diagnosis Date    Acute hypoxemic respiratory failure 11/7/2015    Acute idiopathic gout of left knee 12/2/2019    Acute idiopathic gout of right elbow 9/23/2021    AICD (automatic cardioverter/defibrillator) present 12/13/2015      Anticoagulant long-term use     Bronchopneumonia 12/20/2021    CHF (congestive heart failure)     Chronic anticoagulation 5/5/2016    Chronic combined systolic and diastolic heart failure 11/26/2012    EF 10-20% on ECHO 2013    Chronic gout 12/2/2019    Clotting disorder     Coronary artery disease involving native coronary artery of native heart without angina pectoris 11/26/2012    Cath 10- Stents patent non-obstructive disease Cath 11-12015 non-obstructive disease     COVID-19 08/2021    Had antibody infusion    Diverticulosis of colon     DVT (deep venous thrombosis), unspecified laterality 11/12/2015    Dysphonia 1/28/2018    Essential hypertension 11/15/2015    Fine motor impairment 2/2/2021    Hyperlipidemia     Hypertensive heart disease with heart failure 5/5/2016    MI (myocardial infarction) 2009    x's 5    Nicotine abuse     Obesity 11/26/2012    Olecranon bursitis of right elbow 9/19/2021     Pulmonary embolism 2011    Renal disorder     LAVERNE    Right carpal tunnel syndrome 4/6/2018    Stented coronary artery 11/26/2012    LAD stent placed 10/17/2007     Trigger thumb of right hand 4/6/2018       Home Medications:  Prior to Admission medications    Medication Sig Start Date End Date Taking? Authorizing Provider   allopurinoL (ZYLOPRIM) 100 MG tablet Take 2 tablets (200 mg total) by mouth once daily. 4/8/22   Grant Zamorano MD   amiodarone (PACERONE) 200 MG Tab TAKE 1 AND 1/2 TABLETS(300 MG) BY MOUTH EVERY DAY 4/1/22   Uma Dupree MD   aspirin (ECOTRIN) 81 MG EC tablet Take 1 tablet (81 mg total) by mouth once daily. 4/8/22 4/8/23  Grant Zamorano MD   atorvastatin (LIPITOR) 80 MG tablet Take 1 tablet (80 mg total) by mouth once daily. 4/1/22   Uma Dupree MD   diphenhydrAMINE (BENADRYL) 25 mg capsule Take 25 mg by mouth as needed for Allergies.    Historical Provider   DOBUTamine (DOBUTREX) 500 mg/250 mL (2,000 mcg/mL) 2.5 mcg/kg/min  Patient is 95.7 kg 4/1/22   Uma Dupree MD   empagliflozin (JARDIANCE) 25 mg tablet Take 1 tablet (25 mg total) by mouth once daily. 4/8/22   Grant Zamorano MD   furosemide (LASIX) 40 MG tablet Take 1 tablet (40 mg total) by mouth 2 (two) times a day. 4/8/22   Grant Zamorano MD   ondansetron (ZOFRAN-ODT) 4 MG TbDL Dissolve 1 tablet (4 mg total) by mouth every 6 (six) hours as needed (nausea). 4/8/22   Grant Zamorano MD   OPW TEST CLAIM - DO NOT FILL OPW test claim. Do not fill. 4/8/22      oxyCODONE-acetaminophen (PERCOCET) 5-325 mg per tablet Take 1 tablet by mouth every 6 (six) hours as needed for Pain. 4/8/22   Grant Zamorano MD   sacubitriL-valsartan (ENTRESTO) 24-26 mg per tablet Take 1 tablet by mouth 2 (two) times daily. 4/8/22   Grant Zamorano MD   simethicone (MYLICON) 80 MG chewable tablet Take 1 tablet (80 mg total) by mouth every 6 (six) hours as needed for Flatulence.  Patient not taking: Reported on 4/13/2022 4/8/22   Grant CALZADA  MD Lona   spironolactone (ALDACTONE) 25 MG tablet Take 1 tablet (25 mg total) by mouth once daily. 4/8/22   Grant Zamorano MD   clopidogreL (PLAVIX) 75 mg tablet Take 1 tablet (75 mg total) by mouth once daily.  Patient not taking: Reported on 3/18/2022 2/4/21 4/1/22  Chuck Sams II, MD   colchicine (COLCRYS) 0.6 mg tablet Take 1 tablet (0.6 mg total) by mouth once daily.  Patient not taking: Reported on 3/18/2022 12/20/21 4/1/22  Jaxson Santo MD   ipratropium (ATROVENT) 0.02 % nebulizer solution Take 2.5 mLs (500 mcg total) by nebulization 4 (four) times daily as needed for Wheezing. Rescue  Patient not taking: Reported on 3/29/2022 12/15/21 4/1/22  Horacio Laughlin MD   metoprolol succinate (TOPROL-XL) 25 MG 24 hr tablet Take 1 tablet (25 mg total) by mouth once daily. 4/1/22 4/8/22  Uma Dupree MD   midodrine (PROAMATINE) 2.5 MG Tab Take 1 tablet (2.5 mg total) by mouth 3 (three) times daily. HOLD until follow up with cardiology 4/1/22 4/8/22  Uma Dupree MD       Review of Systems:  Review of Systems   Constitutional: Positive for fatigue. Negative for chills and fever.   Respiratory: Negative for cough, shortness of breath and wheezing.    Cardiovascular: Negative for chest pain, palpitations and leg swelling.   Gastrointestinal: Positive for nausea. Negative for abdominal pain, constipation, diarrhea and vomiting.   Genitourinary: Positive for frequency. Negative for dysuria and urgency.   Skin: Negative for rash.   Neurological: Positive for dizziness and light-headedness. Negative for headaches.       Health Maintainence:   Immunizations:  Health Maintenance       Date Due Completion Date    COVID-19 Vaccine (1) Never done ---    Shingles Vaccine (2 of 3) 12/15/2017 10/20/2017    Colorectal Cancer Screening 07/20/2018 7/20/2017    PROSTATE-SPECIFIC ANTIGEN 12/04/2020 12/4/2019    Lipid Panel 01/20/2022 1/20/2021    High Dose Statin 04/13/2023 4/13/2022    TETANUS VACCINE 06/06/2026 6/6/2016     "       PHYSICAL EXAM     BP (!) 80/60 (BP Location: Right arm, Patient Position: Sitting, BP Method: Medium (Manual))   Pulse 85   Ht 5' 7" (1.702 m)   Wt 93.3 kg (205 lb 11 oz)   SpO2 99%   BMI 32.22 kg/m²     Physical Exam  Constitutional:       General: He is not in acute distress.     Appearance: He is ill-appearing. He is not diaphoretic.   HENT:      Head: Normocephalic.   Cardiovascular:      Rate and Rhythm: Normal rate.      Pulses: Normal pulses.   Pulmonary:      Effort: Pulmonary effort is normal.      Breath sounds: Normal breath sounds.   Abdominal:      General: Abdomen is flat. There is no distension.      Palpations: There is no mass.      Tenderness: There is no abdominal tenderness. There is no guarding or rebound.      Hernia: No hernia is present.   Musculoskeletal:      Right lower leg: No edema.      Left lower leg: No edema.   Skin:     General: Skin is warm and dry.      Capillary Refill: Capillary refill takes less than 2 seconds.   Neurological:      General: No focal deficit present.      Mental Status: He is alert.         LABS     Lab Results   Component Value Date    HGBA1C 5.7 (H) 09/20/2021     CMP  Sodium   Date Value Ref Range Status   04/25/2022 138 136 - 145 mmol/L Final     Potassium   Date Value Ref Range Status   04/25/2022 4.6 3.5 - 5.1 mmol/L Final     Chloride   Date Value Ref Range Status   04/25/2022 103 95 - 110 mmol/L Final     CO2   Date Value Ref Range Status   04/25/2022 17 (L) 23 - 29 mmol/L Final     Glucose   Date Value Ref Range Status   04/25/2022 112 (H) 70 - 110 mg/dL Final     BUN   Date Value Ref Range Status   04/25/2022 36 (H) 8 - 23 mg/dL Final     Creatinine   Date Value Ref Range Status   04/25/2022 1.7 (H) 0.5 - 1.4 mg/dL Final     Calcium   Date Value Ref Range Status   04/25/2022 9.7 8.7 - 10.5 mg/dL Final     Total Protein   Date Value Ref Range Status   04/25/2022 7.2 6.0 - 8.4 g/dL Final     Albumin   Date Value Ref Range Status   04/25/2022 " 3.5 3.5 - 5.2 g/dL Final     Total Bilirubin   Date Value Ref Range Status   04/25/2022 0.4 0.1 - 1.0 mg/dL Final     Comment:     For infants and newborns, interpretation of results should be based  on gestational age, weight and in agreement with clinical  observations.    Premature Infant recommended reference ranges:  Up to 24 hours.............<8.0 mg/dL  Up to 48 hours............<12.0 mg/dL  3-5 days..................<15.0 mg/dL  6-29 days.................<15.0 mg/dL       Alkaline Phosphatase   Date Value Ref Range Status   04/25/2022 81 55 - 135 U/L Final     AST   Date Value Ref Range Status   04/25/2022 23 10 - 40 U/L Final     ALT   Date Value Ref Range Status   04/25/2022 18 10 - 44 U/L Final     Anion Gap   Date Value Ref Range Status   04/25/2022 18 (H) 8 - 16 mmol/L Final     eGFR if    Date Value Ref Range Status   04/25/2022 46.5 (A) >60 mL/min/1.73 m^2 Final     eGFR if non    Date Value Ref Range Status   04/25/2022 40.3 (A) >60 mL/min/1.73 m^2 Final     Comment:     Calculation used to obtain the estimated glomerular filtration  rate (eGFR) is the CKD-EPI equation.        Lab Results   Component Value Date    WBC 7.02 04/25/2022    HGB 13.0 (L) 04/25/2022    HCT 41.2 04/25/2022    MCV 89 04/25/2022     04/25/2022     Lab Results   Component Value Date    CHOL 161 01/20/2021    CHOL 137 12/04/2019    CHOL 134 08/01/2018     Lab Results   Component Value Date    HDL 50 01/20/2021    HDL 45 12/04/2019    HDL 53 08/01/2018     Lab Results   Component Value Date    LDLCALC 85.0 01/20/2021    LDLCALC 66.6 12/04/2019    LDLCALC 66.4 08/01/2018     Lab Results   Component Value Date    TRIG 130 01/20/2021    TRIG 127 12/04/2019    TRIG 73 08/01/2018     Lab Results   Component Value Date    CHOLHDL 31.1 01/20/2021    CHOLHDL 32.8 12/04/2019    CHOLHDL 39.6 08/01/2018     Lab Results   Component Value Date    TSH 2.169 02/04/2021       ASSESSMENT/PLAN     Audrey GEORGE  Thad Mccarthy is a 71 y.o. male     Postural dizziness with presyncope- will decrease lasix to 40mg once daily x 2 days. Cont to monitor weight. Can consume 1.5L of fluid per day.    Chronic combined systolic and diastolic congestive heart failure- stable. Will monitor weight and FR and low na diet. Cont current meds. Hold lasix am dose x 2 days. F/u with cardiology (heart failure clinic)     Cardiomyopathy, ischemic- stable. Will monitor weight and FR and low na diet. Cont current meds. Hold lasix am dose x 2 days. F/u with cardiology (heart failure clinic)     Coronary artery disease involving native coronary artery of native heart without angina pectoris- stable. Will monitor weight and FR and low na diet. Cont current meds. Hold lasix am dose x 2 days. F/u with cardiology (heart failure clinic)     Essential hypertension- low- likely from fluid depletion. Will hold am lasix x 2 days and f/u with cardiology     Long term current use of amiodarone- stable. Will monitor and cont amiodarone     Mixed hyperlipidemia- stable. Will cont statin     Thoracic aortic atherosclerosis    Prediabetes- stable. Will cont low sugar diet   Lab Results   Component Value Date    HGBA1C 5.7 (H) 09/20/2021       Patient cannot afford medications  -     Ambulatory referral/consult to Pharmacy Assistance; Future; Expected date: 05/05/2022    Follow up with PCP    Patient education provided from [facility]. Patient was counseled on when and how to seek emergent care.       Samanta العراقي, TANYA, FNP-c   Department of Internal Medicine - [facility]  8:47 AM

## 2022-04-29 NOTE — TELEPHONE ENCOUNTER
I left a message and sent a letter to patient mychart offering assistance with Pharmacy Patient Assistance.

## 2022-05-04 NOTE — TELEPHONE ENCOUNTER
Called and confirmed pt's appts for May 9 with pt, including appt times and locations.  Pt stated that he did not receive the mailed appt slips.  Appt slips emailed to pt today to ensure he has them for Monday.

## 2022-05-09 NOTE — PROGRESS NOTES
Procedure explained. optison given ivp via right upper arm picc line. Flushed before and after with 10 cc ns. Aseptic technique maintained. Green cap placed on end of luer lock. Lumen clamped. Pt and wife instructed to flushe with heparin when they got home. Verbalizes understanding. Tolerated well.

## 2022-05-09 NOTE — PROGRESS NOTES
"Subjective:      Patient ID: Audrey Oneil Jr. is a 71 y.o. male.    Chief Complaint: No chief complaint on file.      HPI:  Audrey Oneil Jr. is a 71 y.o. male who presents as an initial consult for advanced options related to HFrEF.  He has a medical history significant for ICM (EF 15%) on home  2.5, s/p St-Rodri CRT-D 5/2019, HTN, HLD, CAD s/p MI.  He reports a recent hospital admission at the beginning of the year that "almost killed me". He was unable to ambulate post discharge however he reports today that he was able to ambulate from the parking lot to the clinic without difficulty.  He denies chest pain and reports functional SOB. Quit smoking 10-15 years ago.  Denies ETOH abuse.       Family and social history reviewed    Review of patient's allergies indicates:   Allergen Reactions    Iodine containing multivitamin Swelling     itching    Keflex [cephalexin] Swelling     Eyes.  Tolerated multiple doses of zosyn and 1 dose of augmentin in 2015 and 2016, respectively    Peaches [peach (prunus persica)] Swelling     eyes    Shellfish containing products Swelling    Fig tree Swelling     itching    Tuberculin spenser test ppd Rash     Past Medical History:   Diagnosis Date    Acute hypoxemic respiratory failure 11/7/2015    Acute idiopathic gout of left knee 12/2/2019    Acute idiopathic gout of right elbow 9/23/2021    AICD (automatic cardioverter/defibrillator) present 12/13/2015      Anticoagulant long-term use     Bronchopneumonia 12/20/2021    CHF (congestive heart failure)     Chronic anticoagulation 5/5/2016    Chronic combined systolic and diastolic heart failure 11/26/2012    EF 10-20% on ECHO 2013    Chronic gout 12/2/2019    Clotting disorder     Coronary artery disease involving native coronary artery of native heart without angina pectoris 11/26/2012    Cath 10- Stents patent non-obstructive disease Cath [id number] non-obstructive disease     COVID-19 08/2021 "    Had antibody infusion    Diverticulosis of colon     DVT (deep venous thrombosis), unspecified laterality 11/12/2015    Dysphonia 1/28/2018    Essential hypertension 11/15/2015    Fine motor impairment 2/2/2021    Hyperlipidemia     Hypertensive heart disease with heart failure 5/5/2016    MI (myocardial infarction) 2009    x's 5    Nicotine abuse     Obesity 11/26/2012    Olecranon bursitis of right elbow 9/19/2021    Pulmonary embolism 2011    Renal disorder     LAVERNE    Right carpal tunnel syndrome 4/6/2018    Stented coronary artery 11/26/2012    LAD stent placed 10/17/2007     Trigger thumb of right hand 4/6/2018     Past Surgical History:   Procedure Laterality Date    APPENDECTOMY      BACK SURGERY      CARDIAC DEFIBRILLATOR PLACEMENT      CARDIAC SURGERY  2007    stent    CARPAL TUNNEL RELEASE Right 04/2018    INSERTION OF BIVENTRICULAR IMPLANTABLE CARDIOVERTER-DEFIBRILLATOR (ICD) N/A 5/3/2019    Procedure: INSERTION, ICD, BIVENTRICULAR;  Surgeon: Teofilo Pal MD;  Location: [facility] EP LAB;  Service: Cardiology;  Laterality: N/A;  ICH CM,  ICD UPGD BI-V,  SJM, MAC, FAS, 3PREP (dual ICD INSITU)    r knee scope      REVISION OF SKIN POCKET FOR CARDIOVERTER-DEFIBRILLATOR Left 5/3/2019    Procedure: Revision, Skin Pocket, For Cardioverter-Defibrillator;  Surgeon: Teofilo Pal MD;  Location: [facility] EP LAB;  Service: Cardiology;  Laterality: Left;    RIGHT HEART CATHETERIZATION Right 6/14/2021    Procedure: INSERTION, CATHETER, RIGHT HEART;  Surgeon: Lizz Nieto MD;  Location: [facility] CATH LAB;  Service: Cardiology;  Laterality: Right;    SPINE SURGERY      TONSILLECTOMY      TRIGGER FINGER RELEASE Right 04/2018    thumb     Family History     Problem Relation (Age of Onset)    Cancer Mother, Paternal Grandmother    Colon polyps Father    Diabetes Mother    Heart disease Mother, Father    No Known Problems Sister, Daughter, Son, Sister, Son    Stroke Father        Social  History     Socioeconomic History    Marital status:     Number of children: 2   Occupational History    Occupation: [employer]     Comment: unemployed   Tobacco Use    Smoking status: Former Smoker     Packs/day: 1.00     Years: 45.00     Pack years: 45.00     Types: Cigarettes     Quit date: 2005     Years since quittin.4    Smokeless tobacco: Never Used    Tobacco comment: 1-1.5 ppd every day.   Substance and Sexual Activity    Alcohol use: No    Drug use: No    Sexual activity: Never     Social Determinants of Health     Financial Resource Strain: Low Risk     Difficulty of Paying Living Expenses: Not hard at all   Food Insecurity: No Food Insecurity    Worried About Running Out of Food in the Last Year: Never true    Ran Out of Food in the Last Year: Never true   Transportation Needs: No Transportation Needs    Lack of Transportation (Medical): No    Lack of Transportation (Non-Medical): No   Physical Activity: Inactive    Days of Exercise per Week: 0 days    Minutes of Exercise per Session: 0 min   Stress: No Stress Concern Present    Feeling of Stress : Only a little   Social Connections: Unknown    Frequency of Communication with Friends and Family: More than three times a week    Frequency of Social Gatherings with Friends and Family: More than three times a week    Attends [employer] Services: Never    Active Member of Clubs or Organizations: No    Attends Club or Organization Meetings: Never   Housing Stability: Unknown    Unable to Pay for Housing in the Last Year: No    Unstable Housing in the Last Year: No       Current Outpatient Medications:     allopurinoL (ZYLOPRIM) 100 MG tablet, Take 2 tablets (200 mg total) by mouth once daily., Disp: 30 tablet, Rfl: 0    amiodarone (PACERONE) 200 MG Tab, TAKE 1 AND 1/2 TABLETS(300 MG) BY MOUTH EVERY DAY, Disp: 135 tablet, Rfl: 3    aspirin (ECOTRIN) 81 MG EC tablet, Take 1 tablet (81 mg total) by mouth once daily.,  Disp: 90 tablet, Rfl: 3    atorvastatin (LIPITOR) 80 MG tablet, Take 1 tablet (80 mg total) by mouth once daily., Disp: 90 tablet, Rfl: 1    diphenhydrAMINE (BENADRYL) 25 mg capsule, Take 25 mg by mouth as needed for Allergies., Disp: , Rfl:     DOBUTamine (DOBUTREX) 500 mg/250 mL (2,000 mcg/mL), 2.5 mcg/kg/min Patient is 95.7 kg, Disp: 250 mL, Rfl: 5    empagliflozin (JARDIANCE) 25 mg tablet, Take 1 tablet (25 mg total) by mouth once daily., Disp: 30 tablet, Rfl: 2    furosemide (LASIX) 40 MG tablet, Take 1 tablet (40 mg total) by mouth 2 (two) times a day., Disp: 60 tablet, Rfl: 2    ondansetron (ZOFRAN-ODT) 4 MG TbDL, Dissolve 1 tablet (4 mg total) by mouth every 6 (six) hours as needed (nausea)., Disp: 30 tablet, Rfl: 1    OPW TEST CLAIM - DO NOT FILL, OPW test claim. Do not fill. (Patient not taking: Reported on 4/28/2022), Disp: 1 tablet, Rfl: 0    oxyCODONE-acetaminophen (PERCOCET) 5-325 mg per tablet, Take 1 tablet by mouth every 6 (six) hours as needed for Pain., Disp: 28 tablet, Rfl: 0    sacubitriL-valsartan (ENTRESTO) 24-26 mg per tablet, Take 1 tablet by mouth 2 (two) times daily., Disp: 60 tablet, Rfl: 2    simethicone (MYLICON) 80 MG chewable tablet, Take 1 tablet (80 mg total) by mouth every 6 (six) hours as needed for Flatulence., Disp: 60 tablet, Rfl: 1    spironolactone (ALDACTONE) 25 MG tablet, Take 1 tablet (25 mg total) by mouth once daily., Disp: 30 tablet, Rfl: 2  Current medications Reviewed    Review of Systems   Constitutional: Negative for activity change, appetite change, fatigue and fever.   HENT: Negative for nosebleeds.    Respiratory: Negative for cough and shortness of breath.    Cardiovascular: Negative for chest pain, palpitations and leg swelling.   Gastrointestinal: Negative for abdominal distention, abdominal pain and nausea.   Genitourinary: Negative for frequency.   Musculoskeletal: Negative for arthralgias and myalgias.   Skin: Negative for rash.   Neurological:  Negative for dizziness and numbness.   Hematological: Does not bruise/bleed easily.     Objective:   Physical Exam  HENT:      Head: Normocephalic and atraumatic.   Eyes:      Extraocular Movements: Extraocular movements intact.   Cardiovascular:      Rate and Rhythm: Normal rate and regular rhythm.   Pulmonary:      Effort: Pulmonary effort is normal.   Abdominal:      General: Abdomen is flat.      Palpations: Abdomen is soft.   Musculoskeletal:         General: Normal range of motion.      Cervical back: Normal range of motion.      Comments: Right UE PICC line with    Skin:     General: Skin is warm and dry.      Capillary Refill: Capillary refill takes less than 2 seconds.   Neurological:      General: No focal deficit present.       Diagnostic Results:   Reviewed  ECHO:  · The left ventricle is severely enlarged with eccentric hypertrophy and severely decreased systolic function. The estimated ejection fraction is 15%.  · There is left ventricular global hypokinesis.  · Grade I left ventricular diastolic dysfunction.  · Normal right ventricular size with normal right ventricular systolic function.  · Mild left atrial enlargement.  · Mild tricuspid regurgitation.  · The estimated PA systolic pressure is 25 mmHg.  · Normal central venous pressure (3 mmHg).  · LVIDD 7.11 cm  · No TAPSE reported  Assessment:   1. HFrEF  Plan:   Continue with evaluation for advanced options related to heart failure. Pt to see Dr Marshall today afternoon.

## 2022-05-11 NOTE — TELEPHONE ENCOUNTER
I called Mr Thad.  After much thought, he would like to proceed with the VAD evaluation.  I let him know that I will work on getting auth for evaluation and we will be in touch with scheduling evaluation appointments.  Verbalized understanding of instructions.

## 2022-05-13 NOTE — ED NOTES
Patient identifiers for Audrey Oneil Jr. 71 y.o. male checked and correct.  Chief Complaint   Patient presents with    Loss of Consciousness    Hypotension     From CHF clinic; dobutamine pump fell, kinked line, cut pt off from flow w/ subsequent syncopal episode. EMS able to restart drip. EMS first unable to establish BP at first, lowest 100s/60s. Pt has pacemaker. Pt alert at triage.     Past Medical History:   Diagnosis Date    Acute hypoxemic respiratory failure 11/7/2015    Acute idiopathic gout of left knee 12/2/2019    Acute idiopathic gout of right elbow 9/23/2021    AICD (automatic cardioverter/defibrillator) present 12/13/2015      Anticoagulant long-term use     Bronchopneumonia 12/20/2021    CHF (congestive heart failure)     Chronic anticoagulation 5/5/2016    Chronic combined systolic and diastolic heart failure 11/26/2012    EF 10-20% on ECHO 2013    Chronic gout 12/2/2019    Clotting disorder     Coronary artery disease involving native coronary artery of native heart without angina pectoris 11/26/2012    Cath 10- Stents patent non-obstructive disease Cath 11-12015 non-obstructive disease     COVID-19 08/2021    Had antibody infusion    Diverticulosis of colon     DVT (deep venous thrombosis), unspecified laterality 11/12/2015    Dysphonia 1/28/2018    Essential hypertension 11/15/2015    Fine motor impairment 2/2/2021    Hyperlipidemia     Hypertensive heart disease with heart failure 5/5/2016    MI (myocardial infarction) 2009    x's 5    Nicotine abuse     Obesity 11/26/2012    Olecranon bursitis of right elbow 9/19/2021    Pulmonary embolism 2011    Renal disorder     LAVERNE    Right carpal tunnel syndrome 4/6/2018    Stented coronary artery 11/26/2012    LAD stent placed 10/17/2007     Trigger thumb of right hand 4/6/2018     Allergies reported:   Review of patient's allergies indicates:   Allergen Reactions    Iodine containing multivitamin  Swelling     itching    Keflex [cephalexin] Swelling     Eyes.  Tolerated multiple doses of zosyn and 1 dose of augmentin in 2015 and 2016, respectively    Peaches [peach (prunus persica)] Swelling     eyes    Shellfish containing products Swelling    Fig tree Swelling     itching    Tuberculin spenser test ppd Rash         LOC: Patient is awake, alert, and aware of environment with an appropriate affect. Patient is oriented x 4 and speaking appropriately.  APPEARANCE: Patient resting comfortably and in no acute distress. Patient is clean and well groomed, patient's clothing is properly fastened.  HEENT: Pt presents with surgical mask on.   SKIN: The skin is warm and dry. Patient has normal skin turgor and moist mucus membranes.   MUSKULOSKELETAL: Patient is moving all extremities well, no obvious deformities noted. Pulses intact.   RESPIRATORY: Airway is open and patent. Respirations are spontaneous and non-labored with normal effort and rate.  CARDIAC: Patient has a normal rate and rhythm. ** on cardiac monitor. No peripheral edema noted.   ABDOMEN: No distention noted. Soft and non-tender upon palpation.  NEUROLOGICAL: pupils **mm, PERRL. Facial expression is symmetrical. Hand grasps are equal bilaterally. Normal sensation in all extremities when touched with finger.      Patient identifiers for Audrey Oneil Jr. 71 y.o. male checked and correct.  Chief Complaint   Patient presents with    Loss of Consciousness    Hypotension     From CHF clinic; dobutamine pump fell, kinked line, cut pt off from flow w/ subsequent syncopal episode. EMS able to restart drip. EMS first unable to establish BP at first, lowest 100s/60s. Pt has pacemaker. Pt alert at triage.     Past Medical History:   Diagnosis Date    Acute hypoxemic respiratory failure 11/7/2015    Acute idiopathic gout of left knee 12/2/2019    Acute idiopathic gout of right elbow 9/23/2021    AICD (automatic cardioverter/defibrillator) present  12/13/2015      Anticoagulant long-term use     Bronchopneumonia 12/20/2021    CHF (congestive heart failure)     Chronic anticoagulation 5/5/2016    Chronic combined systolic and diastolic heart failure 11/26/2012    EF 10-20% on ECHO 2013    Chronic gout 12/2/2019    Clotting disorder     Coronary artery disease involving native coronary artery of native heart without angina pectoris 11/26/2012    Cath 10- Stents patent non-obstructive disease Cath 11-12015 non-obstructive disease     COVID-19 08/2021    Had antibody infusion    Diverticulosis of colon     DVT (deep venous thrombosis), unspecified laterality 11/12/2015    Dysphonia 1/28/2018    Essential hypertension 11/15/2015    Fine motor impairment 2/2/2021    Hyperlipidemia     Hypertensive heart disease with heart failure 5/5/2016    MI (myocardial infarction) 2009    x's 5    Nicotine abuse     Obesity 11/26/2012    Olecranon bursitis of right elbow 9/19/2021    Pulmonary embolism 2011    Renal disorder     LAVERNE    Right carpal tunnel syndrome 4/6/2018    Stented coronary artery 11/26/2012    LAD stent placed 10/17/2007     Trigger thumb of right hand 4/6/2018     Allergies reported:   Review of patient's allergies indicates:   Allergen Reactions    Iodine containing multivitamin Swelling     itching    Keflex [cephalexin] Swelling     Eyes.  Tolerated multiple doses of zosyn and 1 dose of augmentin in 2015 and 2016, respectively    Peaches [peach (prunus persica)] Swelling     eyes    Shellfish containing products Swelling    Fig tree Swelling     itching    Tuberculin spenser test ppd Rash         LOC: Patient is awake, alert, and aware of environment with an appropriate affect. Patient is oriented x 4 and speaking appropriately.  APPEARANCE: Patient resting comfortably and in no acute distress. Patient is clean and well groomed, patient's clothing is properly fastened.  HEENT: Pt presents with surgical mask on.    SKIN: The skin is warm and dry. Patient has normal skin turgor and moist mucus membranes.   MUSKULOSKELETAL: Patient is moving all extremities well, no obvious deformities noted. Pulses intact.   RESPIRATORY: Airway is open and patent. Respirations are spontaneous and non-labored with normal effort and rate.  CARDIAC: Patient has a normal rate and rhythm. ** on cardiac monitor. No peripheral edema noted.   ABDOMEN: No distention noted. Soft and non-tender upon palpation.  NEUROLOGICAL: pupils **mm, PERRL. Facial expression is symmetrical. Hand grasps are equal bilaterally. Normal sensation in all extremities when touched with finger.      Patient identifiers for Audrey Oneil Jr. 71 y.o. male checked and correct.  Chief Complaint   Patient presents with    Loss of Consciousness    Hypotension     From CHF clinic; dobutamine pump fell, kinked line, cut pt off from flow w/ subsequent syncopal episode. EMS able to restart drip. EMS first unable to establish BP at first, lowest 100s/60s. Pt has pacemaker. Pt alert at triage.     Past Medical History:   Diagnosis Date    Acute hypoxemic respiratory failure 11/7/2015    Acute idiopathic gout of left knee 12/2/2019    Acute idiopathic gout of right elbow 9/23/2021    AICD (automatic cardioverter/defibrillator) present 12/13/2015      Anticoagulant long-term use     Bronchopneumonia 12/20/2021    CHF (congestive heart failure)     Chronic anticoagulation 5/5/2016    Chronic combined systolic and diastolic heart failure 11/26/2012    EF 10-20% on ECHO 2013    Chronic gout 12/2/2019    Clotting disorder     Coronary artery disease involving native coronary artery of native heart without angina pectoris 11/26/2012    Cath 10- Stents patent non-obstructive disease Cath 11-12015 non-obstructive disease     COVID-19 08/2021    Had antibody infusion    Diverticulosis of colon     DVT (deep venous thrombosis), unspecified laterality 11/12/2015     Dysphonia 1/28/2018    Essential hypertension 11/15/2015    Fine motor impairment 2/2/2021    Hyperlipidemia     Hypertensive heart disease with heart failure 5/5/2016    MI (myocardial infarction) 2009    x's 5    Nicotine abuse     Obesity 11/26/2012    Olecranon bursitis of right elbow 9/19/2021    Pulmonary embolism 2011    Renal disorder     LAVERNE    Right carpal tunnel syndrome 4/6/2018    Stented coronary artery 11/26/2012    LAD stent placed 10/17/2007     Trigger thumb of right hand 4/6/2018     Allergies reported:   Review of patient's allergies indicates:   Allergen Reactions    Iodine containing multivitamin Swelling     itching    Keflex [cephalexin] Swelling     Eyes.  Tolerated multiple doses of zosyn and 1 dose of augmentin in 2015 and 2016, respectively    Peaches [peach (prunus persica)] Swelling     eyes    Shellfish containing products Swelling    Fig tree Swelling     itching    Tuberculin spenser test ppd Rash         LOC: Patient is awake, alert, and aware of environment with an appropriate affect. Patient is oriented x 4 and speaking appropriately.  APPEARANCE: Patient resting comfortably and in no acute distress. Patient is clean and well groomed, patient's clothing is properly fastened.  HEENT: Pt presents with surgical mask on.   SKIN: The skin is warm and dry. Patient has normal skin turgor and moist mucus membranes.   MUSKULOSKELETAL: Patient is moving all extremities well, no obvious deformities noted. Pulses intact.   RESPIRATORY: Airway is open and patent. Respirations are spontaneous and non-labored with normal effort and rate.  CARDIAC: Patient has a normal rate and rhythm. ** on cardiac monitor. No peripheral edema noted.   ABDOMEN: No distention noted. Soft and non-tender upon palpation.  NEUROLOGICAL: pupils **mm, PERRL. Facial expression is symmetrical. Hand grasps are equal bilaterally. Normal sensation in all extremities when touched with finger.      Patient  identifiers for Audrey Oneil Jr. 71 y.o. male checked and correct.  Chief Complaint   Patient presents with    Loss of Consciousness    Hypotension     From CHF clinic; dobutamine pump fell, kinked line, cut pt off from flow w/ subsequent syncopal episode. EMS able to restart drip. EMS first unable to establish BP at first, lowest 100s/60s. Pt has pacemaker. Pt alert at triage.     Past Medical History:   Diagnosis Date    Acute hypoxemic respiratory failure 11/7/2015    Acute idiopathic gout of left knee 12/2/2019    Acute idiopathic gout of right elbow 9/23/2021    AICD (automatic cardioverter/defibrillator) present 12/13/2015      Anticoagulant long-term use     Bronchopneumonia 12/20/2021    CHF (congestive heart failure)     Chronic anticoagulation 5/5/2016    Chronic combined systolic and diastolic heart failure 11/26/2012    EF 10-20% on ECHO 2013    Chronic gout 12/2/2019    Clotting disorder     Coronary artery disease involving native coronary artery of native heart without angina pectoris 11/26/2012    Cath 10- Stents patent non-obstructive disease Cath 11-12015 non-obstructive disease     COVID-19 08/2021    Had antibody infusion    Diverticulosis of colon     DVT (deep venous thrombosis), unspecified laterality 11/12/2015    Dysphonia 1/28/2018    Essential hypertension 11/15/2015    Fine motor impairment 2/2/2021    Hyperlipidemia     Hypertensive heart disease with heart failure 5/5/2016    MI (myocardial infarction) 2009    x's 5    Nicotine abuse     Obesity 11/26/2012    Olecranon bursitis of right elbow 9/19/2021    Pulmonary embolism 2011    Renal disorder     LAVERNE    Right carpal tunnel syndrome 4/6/2018    Stented coronary artery 11/26/2012    LAD stent placed 10/17/2007     Trigger thumb of right hand 4/6/2018     Allergies reported:   Review of patient's allergies indicates:   Allergen Reactions    Iodine containing multivitamin Swelling      itching    Keflex [cephalexin] Swelling     Eyes.  Tolerated multiple doses of zosyn and 1 dose of augmentin in 2015 and 2016, respectively    Peaches [peach (prunus persica)] Swelling     eyes    Shellfish containing products Swelling    Fig tree Swelling     itching    Tuberculin spenser test ppd Rash         LOC: Patient is awake, alert, and aware of environment with an appropriate affect. Patient is oriented x 4 and speaking appropriately.  APPEARANCE: Patient resting comfortably and in no acute distress. Patient is clean and well groomed, patient's clothing is properly fastened.  HEENT: Pt presents with surgical mask on.   SKIN: The skin is warm and dry. Patient has normal skin turgor and moist mucus membranes.   MUSKULOSKELETAL: Patient is moving all extremities well, no obvious deformities noted. Pulses intact.   RESPIRATORY: Airway is open and patent. Respirations are spontaneous and non-labored with normal effort and rate.  CARDIAC: Patient has a normal rate and rhythm. ** on cardiac monitor. No peripheral edema noted.   ABDOMEN: No distention noted. Soft and non-tender upon palpation.  NEUROLOGICAL: pupils **mm, PERRL. Facial expression is symmetrical. Hand grasps are equal bilaterally. Normal sensation in all extremities when touched with finger.      Patient identifiers for Audrey Oneil Jr. 71 y.o. male checked and correct.  Chief Complaint   Patient presents with    Loss of Consciousness    Hypotension     From CHF clinic; dobutamine pump fell, kinked line, cut pt off from flow w/ subsequent syncopal episode. EMS able to restart drip. EMS first unable to establish BP at first, lowest 100s/60s. Pt has pacemaker. Pt alert at triage.     Past Medical History:   Diagnosis Date    Acute hypoxemic respiratory failure 11/7/2015    Acute idiopathic gout of left knee 12/2/2019    Acute idiopathic gout of right elbow 9/23/2021    AICD (automatic cardioverter/defibrillator) present 12/13/2015       Anticoagulant long-term use     Bronchopneumonia 12/20/2021    CHF (congestive heart failure)     Chronic anticoagulation 5/5/2016    Chronic combined systolic and diastolic heart failure 11/26/2012    EF 10-20% on ECHO 2013    Chronic gout 12/2/2019    Clotting disorder     Coronary artery disease involving native coronary artery of native heart without angina pectoris 11/26/2012    Cath 10- Stents patent non-obstructive disease Cath 11-12015 non-obstructive disease     COVID-19 08/2021    Had antibody infusion    Diverticulosis of colon     DVT (deep venous thrombosis), unspecified laterality 11/12/2015    Dysphonia 1/28/2018    Essential hypertension 11/15/2015    Fine motor impairment 2/2/2021    Hyperlipidemia     Hypertensive heart disease with heart failure 5/5/2016    MI (myocardial infarction) 2009    x's 5    Nicotine abuse     Obesity 11/26/2012    Olecranon bursitis of right elbow 9/19/2021    Pulmonary embolism 2011    Renal disorder     LAVERNE    Right carpal tunnel syndrome 4/6/2018    Stented coronary artery 11/26/2012    LAD stent placed 10/17/2007     Trigger thumb of right hand 4/6/2018     Allergies reported:   Review of patient's allergies indicates:   Allergen Reactions    Iodine containing multivitamin Swelling     itching    Keflex [cephalexin] Swelling     Eyes.  Tolerated multiple doses of zosyn and 1 dose of augmentin in 2015 and 2016, respectively    Peaches [peach (prunus persica)] Swelling     eyes    Shellfish containing products Swelling    Fig tree Swelling     itching    Tuberculin spenser test ppd Rash         LOC: Patient is awake, alert, and aware of environment with an appropriate affect. Patient is oriented x 4 and speaking appropriately.  APPEARANCE: Patient resting comfortably and in no acute distress. Patient is clean and well groomed, patient's clothing is properly fastened.  HEENT: Pt presents with surgical mask on.   SKIN: The skin is  warm and dry. Patient has normal skin turgor and moist mucus membranes.   MUSKULOSKELETAL: Patient is moving all extremities well, no obvious deformities noted. Pulses intact.   RESPIRATORY: Airway is open and patent. Respirations are spontaneous and non-labored with normal effort and rate.  CARDIAC: Patient has a normal rate and rhythm. ** on cardiac monitor. No peripheral edema noted.   ABDOMEN: No distention noted. Soft and non-tender upon palpation.  NEUROLOGICAL: pupils **mm, PERRL. Facial expression is symmetrical. Hand grasps are equal bilaterally. Normal sensation in all extremities when touched with finger.      Patient identifiers for Audrey Oneil Jr. 71 y.o. male checked and correct.  Chief Complaint   Patient presents with    Loss of Consciousness    Hypotension     From CHF clinic; dobutamine pump fell, kinked line, cut pt off from flow w/ subsequent syncopal episode. EMS able to restart drip. EMS first unable to establish BP at first, lowest 100s/60s. Pt has pacemaker. Pt alert at triage.     Past Medical History:   Diagnosis Date    Acute hypoxemic respiratory failure 11/7/2015    Acute idiopathic gout of left knee 12/2/2019    Acute idiopathic gout of right elbow 9/23/2021    AICD (automatic cardioverter/defibrillator) present 12/13/2015      Anticoagulant long-term use     Bronchopneumonia 12/20/2021    CHF (congestive heart failure)     Chronic anticoagulation 5/5/2016    Chronic combined systolic and diastolic heart failure 11/26/2012    EF 10-20% on ECHO 2013    Chronic gout 12/2/2019    Clotting disorder     Coronary artery disease involving native coronary artery of native heart without angina pectoris 11/26/2012    Cath 10- Stents patent non-obstructive disease Cath 11-12015 non-obstructive disease     COVID-19 08/2021    Had antibody infusion    Diverticulosis of colon     DVT (deep venous thrombosis), unspecified laterality 11/12/2015    Dysphonia  1/28/2018    Essential hypertension 11/15/2015    Fine motor impairment 2/2/2021    Hyperlipidemia     Hypertensive heart disease with heart failure 5/5/2016    MI (myocardial infarction) 2009    x's 5    Nicotine abuse     Obesity 11/26/2012    Olecranon bursitis of right elbow 9/19/2021    Pulmonary embolism 2011    Renal disorder     LAVERNE    Right carpal tunnel syndrome 4/6/2018    Stented coronary artery 11/26/2012    LAD stent placed 10/17/2007     Trigger thumb of right hand 4/6/2018     Allergies reported:   Review of patient's allergies indicates:   Allergen Reactions    Iodine containing multivitamin Swelling     itching    Keflex [cephalexin] Swelling     Eyes.  Tolerated multiple doses of zosyn and 1 dose of augmentin in 2015 and 2016, respectively    Peaches [peach (prunus persica)] Swelling     eyes    Shellfish containing products Swelling    Fig tree Swelling     itching    Tuberculin spenser test ppd Rash         LOC: Patient is awake, alert, and aware of environment with an appropriate affect. Patient is oriented x 4 and speaking appropriately.  APPEARANCE: Patient resting comfortably and in no acute distress. Patient is clean and well groomed, patient's clothing is properly fastened.  HEENT: Pt presents with surgical mask on.   SKIN: The skin is warm and dry. Patient has normal skin turgor and moist mucus membranes.   MUSKULOSKELETAL: Patient is moving all extremities well, no obvious deformities noted. Pulses intact.   RESPIRATORY: Airway is open and patent. Respirations are spontaneous and non-labored with normal effort and rate.  CARDIAC: Patient has a normal rate and rhythm(SR-79). ** on cardiac monitor. No peripheral edema noted.   ABDOMEN: No distention noted. Soft and non-tender upon palpation.  NEUROLOGICAL: pupils **mm, PERRL. Facial expression is symmetrical. Hand grasps are equal bilaterally. Normal sensation in all extremities when touched with finger.

## 2022-05-13 NOTE — DISCHARGE INSTRUCTIONS
Please return to the emergency department if you have recurrence of lightheadedness or weakness. Additionally, if you develop chest pain, shortness of breath, episodes of passing out or shocks from your cardiac device, please return to the emergency department.

## 2022-05-13 NOTE — ED NOTES
Patient identifiers for Audrey Oneil Jr. 71 y.o. male checked and correct.  Chief Complaint   Patient presents with    Loss of Consciousness    Hypotension     From CHF clinic; dobutamine pump fell, kinked line, cut pt off from flow w/ subsequent syncopal episode. EMS able to restart drip. EMS first unable to establish BP at first, lowest 100s/60s. Pt has pacemaker. Pt alert at triage.     Past Medical History:   Diagnosis Date    Acute hypoxemic respiratory failure 11/7/2015    Acute idiopathic gout of left knee 12/2/2019    Acute idiopathic gout of right elbow 9/23/2021    AICD (automatic cardioverter/defibrillator) present 12/13/2015      Anticoagulant long-term use     Bronchopneumonia 12/20/2021    CHF (congestive heart failure)     Chronic anticoagulation 5/5/2016    Chronic combined systolic and diastolic heart failure 11/26/2012    EF 10-20% on ECHO 2013    Chronic gout 12/2/2019    Clotting disorder     Coronary artery disease involving native coronary artery of native heart without angina pectoris 11/26/2012    Cath 10- Stents patent non-obstructive disease Cath 11-12015 non-obstructive disease     COVID-19 08/2021    Had antibody infusion    Diverticulosis of colon     DVT (deep venous thrombosis), unspecified laterality 11/12/2015    Dysphonia 1/28/2018    Essential hypertension 11/15/2015    Fine motor impairment 2/2/2021    Hyperlipidemia     Hypertensive heart disease with heart failure 5/5/2016    MI (myocardial infarction) 2009    x's 5    Nicotine abuse     Obesity 11/26/2012    Olecranon bursitis of right elbow 9/19/2021    Pulmonary embolism 2011    Renal disorder     LAVERNE    Right carpal tunnel syndrome 4/6/2018    Stented coronary artery 11/26/2012    LAD stent placed 10/17/2007     Trigger thumb of right hand 4/6/2018     Allergies reported:   Review of patient's allergies indicates:   Allergen Reactions    Iodine containing multivitamin  Swelling     itching    Keflex [cephalexin] Swelling     Eyes.  Tolerated multiple doses of zosyn and 1 dose of augmentin in 2015 and 2016, respectively    Peaches [peach (prunus persica)] Swelling     eyes    Shellfish containing products Swelling    Fig tree Swelling     itching    Tuberculin spenser test ppd Rash         LOC: Patient is awake, alert, and aware of environment with an appropriate affect. Patient is oriented x 4 and speaking appropriately.  APPEARANCE: Patient resting comfortably and in no acute distress. Patient is clean and well groomed, patient's clothing is properly fastened.  HEENT: Pt presents with surgical mask on.   SKIN: The skin is warm and dry. Patient has normal skin turgor and moist mucus membranes.   MUSKULOSKELETAL: Patient is moving all extremities well, no obvious deformities noted. Pulses intact.   RESPIRATORY: Airway is open and patent. Respirations are spontaneous and non-labored with normal effort and rate.  CARDIAC: Patient has a normal rate and rhythm. ** on cardiac monitor. No peripheral edema noted.   ABDOMEN: No distention noted. Soft and non-tender upon palpation.  NEUROLOGICAL: pupils **mm, PERRL. Facial expression is symmetrical. Hand grasps are equal bilaterally. Normal sensation in all extremities when touched with finger.

## 2022-05-14 NOTE — CARE UPDATE
71/M with ICM (EF 15%) on home  2.5, s/p [facility] CRT-D 5/2019, HTN, HLD. Initially refused to consider LVAD but since Jauary 2022 he developed recurrent VT and cardiogenic shock for which he was hospitalized at [facility], subsequently sent home on . He remains on  and now wants to pursue evaluation for LVAD. Seen in clinic by Dr. Henry.     He cannot tolerate higher doses of meds due to low BP and orthostasis. Was seen in the ER 3 weeks ago for postural dizziness with presyncope secondary to dehydration and vasovagal response.     Seen in the ER today after having his  pump check after a fall and during that evaluation he was noted hypotensive however asymptomatic. He did express dizziness when he got up from sitting position which prompted EMS bringing him to the ER.     Here he remains asymptomatic. Bloop pressures were ranging with systolic of 70-80 with occasional high 60s which prompted a cardiology consultation. His ECG shows a ASVP rhythm.     He says he is extremely concerned about not being eligible for LVAD placement. So he has been strict with fluid intake to <1L daily. Labs suggest mild pre-renal LAVERNE. There is no evidence of clinical HF on my evaluation. I interrogated his ICD device which is functioning fine and has no tachyarrhythmia.     Vitals:    05/13/22 2033   BP: (!) 98/54   Pulse: 79   Resp: 18   Temp:        Physical Exam  Constitutional:       General: He is not in acute distress.     Appearance: He is not ill-appearing.   Cardiovascular:      Rate and Rhythm: Normal rate and regular rhythm.      Heart sounds: No murmur heard.  Pulmonary:      Effort: Pulmonary effort is normal. No respiratory distress.      Breath sounds: No wheezing or rales.   Abdominal:      General: Bowel sounds are normal.      Palpations: Abdomen is soft.      Tenderness: There is no abdominal tenderness.   Skin:     Capillary Refill: Capillary refill takes less than 2 seconds.   Neurological:      General: No  focal deficit present.      Mental Status: He is alert. Mental status is at baseline.       A/P:   Reasonable to give gentle hydration during his stay in the ER and assess BP trend. If there is improvement then patient is eligible for discharging home. Continue with his medications without change. He was advised to assess hi wt at home and continue with a less strict I/O and to balance his input with his output. Continue with outpatient clinic follow ups and labs.

## 2022-05-14 NOTE — ED PROVIDER NOTES
Encounter Date: 5/13/2022       History     Chief Complaint   Patient presents with    Loss of Consciousness    Hypotension     From CHF clinic; dobutamine pump fell, kinked line, cut pt off from flow w/ subsequent syncopal episode. EMS able to restart drip. EMS first unable to establish BP at first, lowest 100s/60s. Pt has pacemaker. Pt alert at triage.     Patient is a 71-year-old male with a past medical history of ischemic cardiomyopathy (EF 10%), AICD placement on a dobutamine gtt awaiting LVAD placement presenting to the emergency department for hypotension and a near syncopal event. His wife reports that he dropped his dobutamine bag, and he bent to pick it up. Upon standing back up, he began experiencing generalized weakness. He denies any lightheadedness or any loss of consciousness. There was no preceding chest pain or shortness of breath. There was no head trauma. He had his pressure recorded and had his lowest systolic BP in the 60s. Upon EMS arrival, BP had normalized to low 100s. He is currently no longer endorsing weakness.        Review of patient's allergies indicates:   Allergen Reactions    Iodine containing multivitamin Swelling     itching    Keflex [cephalexin] Swelling     Eyes.  Tolerated multiple doses of zosyn and 1 dose of augmentin in 2015 and 2016, respectively    Peaches [peach (prunus persica)] Swelling     eyes    Shellfish containing products Swelling    Fig tree Swelling     itching    Tuberculin spenser test ppd Rash     Past Medical History:   Diagnosis Date    Acute hypoxemic respiratory failure 11/7/2015    Acute idiopathic gout of left knee 12/2/2019    Acute idiopathic gout of right elbow 9/23/2021    AICD (automatic cardioverter/defibrillator) present 12/13/2015      Anticoagulant long-term use     Bronchopneumonia 12/20/2021    CHF (congestive heart failure)     Chronic anticoagulation 5/5/2016    Chronic combined systolic and diastolic heart failure  11/26/2012    EF 10-20% on ECHO 2013    Chronic gout 12/2/2019    Clotting disorder     Coronary artery disease involving native coronary artery of native heart without angina pectoris 11/26/2012    Cath 10- Stents patent non-obstructive disease Cath 11-12015 non-obstructive disease     COVID-19 08/2021    Had antibody infusion    Diverticulosis of colon     DVT (deep venous thrombosis), unspecified laterality 11/12/2015    Dysphonia 1/28/2018    Essential hypertension 11/15/2015    Fine motor impairment 2/2/2021    Hyperlipidemia     Hypertensive heart disease with heart failure 5/5/2016    MI (myocardial infarction) 2009    x's 5    Nicotine abuse     Obesity 11/26/2012    Olecranon bursitis of right elbow 9/19/2021    Pulmonary embolism 2011    Renal disorder     LAVERNE    Right carpal tunnel syndrome 4/6/2018    Stented coronary artery 11/26/2012    LAD stent placed 10/17/2007     Trigger thumb of right hand 4/6/2018     Past Surgical History:   Procedure Laterality Date    APPENDECTOMY      BACK SURGERY      CARDIAC DEFIBRILLATOR PLACEMENT      CARDIAC SURGERY  2007    stent    CARPAL TUNNEL RELEASE Right 04/2018    INSERTION OF BIVENTRICULAR IMPLANTABLE CARDIOVERTER-DEFIBRILLATOR (ICD) N/A 5/3/2019    Procedure: INSERTION, ICD, BIVENTRICULAR;  Surgeon: Teofilo Pal MD;  Location: [facility] EP LAB;  Service: Cardiology;  Laterality: N/A;  ICH CM,  ICD UPGD BI-V,  SJM, MAC, FAS, 3PREP (dual ICD INSITU)    r knee scope      REVISION OF SKIN POCKET FOR CARDIOVERTER-DEFIBRILLATOR Left 5/3/2019    Procedure: Revision, Skin Pocket, For Cardioverter-Defibrillator;  Surgeon: Teofilo Pal MD;  Location: [facility] EP LAB;  Service: Cardiology;  Laterality: Left;    RIGHT HEART CATHETERIZATION Right 6/14/2021    Procedure: INSERTION, CATHETER, RIGHT HEART;  Surgeon: Lizz Nieto MD;  Location: [facility] CATH LAB;  Service: Cardiology;  Laterality: Right;    SPINE SURGERY       TONSILLECTOMY      TRIGGER FINGER RELEASE Right 2018    thumb     Family History   Problem Relation Age of Onset    Cancer Mother         colon cancer    Heart disease Mother     Diabetes Mother     Heart disease Father     Stroke Father     Colon polyps Father     Cancer Paternal Grandmother         leukemia    No Known Problems Sister     No Known Problems Daughter     No Known Problems Son     No Known Problems Sister     No Known Problems Son      Social History     Tobacco Use    Smoking status: Former Smoker     Packs/day: 1.00     Years: 45.00     Pack years: 45.00     Types: Cigarettes     Quit date: 2005     Years since quittin.4    Smokeless tobacco: Never Used    Tobacco comment: 1-1.5 ppd every day.   Substance Use Topics    Alcohol use: No    Drug use: No     Review of Systems   Constitutional: Negative for activity change, chills and fever.   HENT: Negative for congestion, ear pain and sore throat.    Respiratory: Negative for shortness of breath and stridor.    Cardiovascular: Negative for chest pain and palpitations.   Gastrointestinal: Negative for abdominal pain, nausea and vomiting.   Genitourinary: Negative for dysuria.   Musculoskeletal: Negative for back pain.   Skin: Negative for rash.   Neurological: Positive for weakness (generalized). Negative for dizziness, syncope and headaches.   Hematological: Does not bruise/bleed easily.       Physical Exam     Initial Vitals [22 1536]   BP Pulse Resp Temp SpO2   119/70 83 18 97.6 °F (36.4 °C) 96 %      MAP       --         Physical Exam    Nursing note and vitals reviewed.  Constitutional: He appears well-developed. He is not diaphoretic. He is Obese . No distress.   Well-appearing. Speaking full sentences. No acute distress.   HENT:   Head: Normocephalic and atraumatic.   Right Ear: External ear normal.   Left Ear: External ear normal.   Neck: Neck supple.   Cardiovascular: Normal rate, regular rhythm, normal  heart sounds and intact distal pulses.   Pulmonary/Chest: Breath sounds normal. No respiratory distress. He has no wheezes. He has no rhonchi. He has no rales.   Abdominal: Abdomen is soft. He exhibits no distension. There is no abdominal tenderness. A hernia is present. Hernia confirmed positive in the umbilical area. There is no rebound and no guarding.   Musculoskeletal:      Cervical back: Neck supple.      Comments: PICC line in place to RUE     Neurological: He is alert and oriented to person, place, and time. GCS score is 15. GCS eye subscore is 4. GCS verbal subscore is 5. GCS motor subscore is 6.   Skin: Skin is warm. Capillary refill takes more than 3 seconds. No rash noted.   Capillary refill >4   Psychiatric: He has a normal mood and affect.         ED Course   Procedures  Labs Reviewed   CBC W/ AUTO DIFFERENTIAL - Abnormal; Notable for the following components:       Result Value    Hemoglobin 13.1 (*)     MCHC 31.0 (*)     RDW 16.8 (*)     All other components within normal limits   B-TYPE NATRIURETIC PEPTIDE - Abnormal; Notable for the following components:     (*)     All other components within normal limits   COMPREHENSIVE METABOLIC PANEL - Abnormal; Notable for the following components:    BUN 38 (*)     Creatinine 2.0 (*)     eGFR if  38.2 (*)     eGFR if non  33.1 (*)     All other components within normal limits   TROPONIN I - Abnormal; Notable for the following components:    Troponin I 0.094 (*)     All other components within normal limits   URINALYSIS, REFLEX TO URINE CULTURE - Abnormal; Notable for the following components:    Glucose, UA 3+ (*)     All other components within normal limits    Narrative:     Specimen Source->Urine   URINALYSIS MICROSCOPIC - Abnormal; Notable for the following components:    Hyaline Casts, UA 4 (*)     All other components within normal limits    Narrative:     Specimen Source->Urine   LACTIC ACID, PLASMA     EKG  Readings: (Independently Interpreted)   Initial Reading: No STEMI. Rhythm: Paced Rhythm. Heart Rate: 93. Ectopy: No Ectopy. Conduction: RBBB.     ECG Results          EKG 12-lead (In process)  Result time 05/13/22 20:56:55    In process by Interface, Lab In [facility] (05/13/22 20:56:55)                 Narrative:    Test Reason : R55,    Vent. Rate : 093 BPM     Atrial Rate : 093 BPM     P-R Int : 166 ms          QRS Dur : 150 ms      QT Int : 426 ms       P-R-T Axes : 037 231 043 degrees     QTc Int : 529 ms    Normal sinus rhythm  Right bundle branch block  Anterolateral infarct ,age undetermined  Abnormal ECG  When compared with ECG of 25-APR-2022 14:50,  Sinus rhythm has replaced Electronic ventricular pacemaker    Referred By: [email or web address]   SELF           Confirmed By:                             Imaging Results    None          Medications   sodium chloride 0.9% bolus 250 mL (0 mLs Intravenous Stopped 5/13/22 1908)     Medical Decision Making:   History:   I obtained history from: someone other than patient.  Old Medical Records: I decided to obtain old medical records.  Initial Assessment:   Emergent evaluation of hypotension. He is afebrile and currently hemodynamically stable, although with soft pressures.  Differential Diagnosis:   Vasovagal vs situational syncope, cardiogenic shock, dehydration, LAVERNE  Clinical Tests:   Lab Tests: Ordered and Reviewed  Radiological Study: Ordered and Reviewed  Medical Tests: Reviewed and Ordered  ED Management:  250 cc boluses were given x2. Workup thus far negative with baseline elevations in his troponin and BNP. Mild LAVERNE. Upon preparing patient for discharge, he had several BP readings with systolics in the 60s and 70s. Discussed case with cardiology. Do not recommend further intervention on their part. Similarly to prior ED visit in April, he is still refraining from eating and drinking in fears that he may gain weight and have his LVAD placement date postponed. Counseled  that persistent hypotension may prevent him from undergoing major surgery. After 500 cc, pressure normalized back to low 100s. He remains without complaints. Discussed strict ED return precautions and he and his wife expressed understanding.                      Clinical Impression:   Final diagnoses:  [I95.89] Other specified hypotension (Primary)  [N17.9] LAVERNE (acute kidney injury)  [Z95.810] AICD (automatic cardioverter/defibrillator) present          ED Disposition Condition    Discharge Stable        ED Prescriptions     None        Follow-up Information    None          Dc Arias MD  Resident  05/13/22 [id number]

## 2022-05-14 NOTE — ED NOTES
Dr Bob at bedside, aware of pt BP. Pt reports this is usually how his BP runs. Pt denies any symptoms at this time. Cardiology to see pt.

## 2022-05-18 NOTE — PROGRESS NOTES
At the request of Dr. Harmon, I have been asked to meet patient and provide VAD education. Introduced self and reason for visit. Pt and Pam, caregiver AAAO.  Provided phase 1 written VAD education. Included in Phase 1 folder is the following:     Evaluation Eval for [facility]  VAD support flyer  Nahid: Living a more active life  Pictures of examples of VADs     Explained the work up process including possible outcomes.     Explained to look over the entire contents and read Evaluation Eval for [facility] acknowledgement form.  Also explained that they should bring this folder with them to all clinic visits and if they are admitted to the hospital so that we can continue education as needed. Should there be any questions, please write them down and bring with you or feel free to call and we can talk on the phone. All questions answered to patient and caregiver's satisfaction as evidence by verbal acknowledgement.

## 2022-05-18 NOTE — DISCHARGE SUMMARY
[facility] - Cardiology [facility]  Discharge Summary      Patient Name: Audrey Oneil Jr.  MRN: [id number]  Patient Class: IP- Inpatient  Admission Date: 4/6/2022  Hospital Length of Stay: 2 days  Discharge Date and Time: 4/8/2022  1:30 PM  Attending Physician: No att. providers found   Discharging Provider: Grant Zamorano MD  Primary Care Provider: January Khan MD  Hospital Medicine Team: [facility] R Grant Zamorano MD    HPI:   Mr. Oneil is a 71 M with PMHx of CAD, HFrEF (10-20%) on chronic dobutamine gtt, s/p AICD, h/o PE, HTN, HLD who presents to [facility] ED with 1 day dyspnea.  Patient was recently discharged from [facility] with pyelonephritis, gout flare, and C diff infection.  After discharge, patient was holding home diuretics per instructions.  On night prior to admission, patient began to feel short of breath with ambulation intent to take home diuretic without improvement.  Patient continued to feel short of breath in the morning and presented to the ED for further evaluation.  Patient denies PND, orthopnea, chest pain, lightheadedness, dizziness, nausea, abdominal pain, fevers, chills, and constipation.  Continues to endorse loose stools while being treated for C diff.    In the ED, patient HDS. Labs notable for BNP 1396, WBC 18, and creatinine 1.2.  CT A/P with no significant findings.  Chest x-ray with bilateral edema, unchanged from prior study.  BLE DVT ultrasound negative.  Cardiology consult in the ED.  Patient given 40 mg IV Lasix.      * No surgery found *      Hospital Course:   Treated for CHF exacerbation with IV lasix and transitioned to oral lasix. Also completed prior c diff treatment with vancomycin course.        Goals of Care Treatment Preferences:  Code Status: Full Code      Consults:   Consults (From admission, onward)        Status Ordering Provider     Inpatient consult to Registered Dietitian/Nutritionist  Once        Provider:  (Not yet assigned)    Desirae ESPINOZA  SUGAR CALZADA     Inpatient consult to Cardiology  Once        Provider:  (Not yet assigned)    Completed SEBASTIAN BELLA          * Acute on chronic combined systolic and diastolic heart failure  Patient presenting with dyspnea setting of holding diuresis from previous hospital admission.  - BMP 3100  - Home: Lasix 40mg  - s/p Lasix 40mg in the ED  - Holding metoprolol, spironolactone  - Cardiology consulted  - Continue home Dobutamine 2.5  - Continue Entresto   - Lasix 40mg IV BID  - Daily standing weight   - Strict input/output  - Daily BMP  - Mg>2, K>4  - Telemetry      Leukocytosis  - Patient with current C diff treatment and recently completed steroids for gout flare.   - WBC 18 on admission. Afebrile.  - Monitor leukocytosis      Clostridium difficile infection  - Continue p.o. vancomycin  - End of treatment 14 days after antibiotics for urinary infection, 4/16      Gout  - Continue home allopurinol      Mixed hyperlipidemia  - Continue home atorvastatin, aspirin      Ventricular tachycardia  - Continue home amiodarone    Coronary artery disease involving native coronary artery of native heart without angina pectoris  Continue aspirin 81      Final Active Diagnoses:    Diagnosis Date Noted POA    PRINCIPAL PROBLEM:  Acute on chronic combined systolic and diastolic heart failure [I50.43] 11/26/2012 Yes    Leukocytosis [D72.829] 04/06/2022 Yes    Clostridium difficile infection [A49.8] 03/30/2022 Yes    Gout [M10.9] 12/02/2019 Yes    Mixed hyperlipidemia [E78.2] 05/05/2016 Yes    Ventricular tachycardia [I47.2] 11/13/2015 Yes    Coronary artery disease involving native coronary artery of native heart without angina pectoris [I25.10] 11/26/2012 Yes     Chronic      Problems Resolved During this Admission:       Discharged Condition: good    Disposition: Home-Health Care [facility]    Follow Up:    Patient Instructions:      Ambulatory referral/consult to Transplant, Heart   Standing Status: Future   Referral  Priority: Routine Referral Type: Transplants   Number of Visits Requested: 1       Significant Diagnostic Studies:      Latest Reference Range & Units 04/08/22 04:41   WBC 3.90 - 12.70 K/uL 10.95   RBC 4.60 - 6.20 M/uL 4.46 (L)   Hemoglobin 14.0 - 18.0 g/dL 12.4 (L)   Hematocrit 40.0 - 54.0 % 39.8 (L)   MCV 82 - 98 fL 89   MCH 27.0 - 31.0 pg 27.8   MCHC 32.0 - 36.0 g/dL 31.2 (L)   RDW 11.5 - 14.5 % 16.3 (H)   Platelets 150 - 450 K/uL 374   (L): Data is abnormally low  (H): Data is abnormally high       Latest Reference Range & Units 04/08/22 04:41   Sodium 136 - 145 mmol/L 140   Potassium 3.5 - 5.1 mmol/L 3.8   Chloride 95 - 110 mmol/L 104   CO2 23 - 29 mmol/L 24   Anion Gap 8 - 16 mmol/L 12   BUN 8 - 23 mg/dL 27 (H)   Creatinine 0.5 - 1.4 mg/dL 1.2   EGFR if non African American >60 mL/min/1.73 m^2 >60.0 [1]   EGFR if African American >60 mL/min/1.73 m^2 >60.0   Glucose 70 - 110 mg/dL 92   Calcium 8.7 - 10.5 mg/dL 8.9   Phosphorus 2.7 - 4.5 mg/dL 4.2   Magnesium 1.6 - 2.6 mg/dL 1.7   Alkaline Phosphatase 55 - 135 U/L 74   PROTEIN TOTAL 6.0 - 8.4 g/dL 6.1   Albumin 3.5 - 5.2 g/dL 2.8 (L)   BILIRUBIN TOTAL 0.1 - 1.0 mg/dL 0.4 [2]   AST 10 - 40 U/L 23   ALT 10 - 44 U/L 17   (H): Data is abnormally high  (L): Data is abnormally low  [1] Calculation used to obtain the estimated glomerular filtration   rate (eGFR) is the CKD-EPI equation.      [2] For infants and newborns, interpretation of results should be based   on gestational age, weight and in agreement with clinical   observations.      Premature Infant recommended reference ranges:   Up to 24 hours.............<8.0 mg/dL   Up to 48 hours............<12.0 mg/dL   3-5 days..................<15.0 mg/dL   6-29 days.................<15.0 mg/dL     Pending Diagnostic Studies:     None         Imaging Results          US Lower Extremity Veins Bilateral (Final result)  Result time 04/06/22 16:00:11    Final result by Dez Bray MD (04/06/22 16:00:11)                  Impression:      No evidence of deep venous thrombosis in either lower extremity.    Electronically signed by resident: Nickie Tom  Date:    04/06/2022  Time:    15:49    Electronically signed by: Dez Bray MD  Date:    04/06/2022  Time:    16:00             Narrative:    EXAMINATION:  US LOWER EXTREMITY VEINS BILATERAL    CLINICAL HISTORY:  Other specified soft tissue disorders    TECHNIQUE:  Duplex and color flow Doppler and dynamic compression was performed of the bilateral lower extremity veins was performed.    COMPARISON:  Ultrasound lower extremity veins bilateral 06/11/2021    FINDINGS:  Right thigh veins: The common femoral, femoral, popliteal, upper greater saphenous, and deep femoral veins are patent and free of thrombus. The veins are normally compressible and have normal phasic flow and augmentation response.    Right calf veins: The visualized calf veins are patent.    Left thigh veins: The common femoral, femoral, popliteal, upper greater saphenous, and deep femoral veins are patent and free of thrombus. The veins are normally compressible and have normal phasic flow and augmentation response.    Left calf veins: The visualized calf veins are patent.    Miscellaneous: None                               CT Abdomen Pelvis With Contrast (Final result)  Result time 04/06/22 10:24:19    Final result by Prudencio Hannon DO (04/06/22 10:24:19)                 Impression:      1. Small left rectus sheath hematoma, decreased in size from prior.  2. Multiple indeterminate hypodensities in the pancreas, possibly representing side branch IPMNs.  Recommend further evaluation with MRI of the abdomen with and without contrast which can be performed on a nonemergent basis.  3. Colonic diverticulosis without evidence of acute diverticulitis.  4. Moderate calcified atherosclerosis.      Electronically signed by: Pruedncio Hannon  Date:    04/06/2022  Time:    10:24             Narrative:     EXAMINATION:  CT ABDOMEN PELVIS WITH CONTRAST    CLINICAL HISTORY:  Abdominal abscess/infection suspected;    TECHNIQUE:  Axial CT images with sagittal and coronal reformats were obtained of the abdomen and pelvis from the hemidiaphragms through the symphysis pubis after the administration of 100mL Omnipaque 350.    COMPARISON:  03/28/2022.    FINDINGS:  Lung Bases: There are trace bilateral pleural effusions.  There are left calcified pleural plaques.    Heart: Heart size is normal.  No pericardial effusion.  Partially imaged cardiac pacer/AICD leads.    Liver: The liver is normal in size and demonstrates homogeneous enhancement without focal lesion.  The portal vasculature is patent.    Biliary tract: No intrahepatic or extrahepatic biliary ductal dilatation.    Gallbladder: No radiodense gallstone. No wall thickening or pericholecystic fluid.    Pancreas: Mild fatty atrophy of the pancreas.  Again seen are several indeterminate hypodensities in the pancreas measuring 1.3 cm (series 2, image 48), 2.0 cm (series 2, image 68), and 1.9 cm (series 2, image 46), likely pancreatic side branch IPMNs or simple cysts.  No pancreatic ductal dilatation.    Spleen: Normal size without focal lesion.    Adrenals: Unremarkable.    Kidneys and urinary collecting systems: There is nonspecific symmetric perinephric fat stranding.  There is a simple exophytic cyst arising from the right kidney inferior pole.  No hydronephrosis or urolithiasis.    Lymph nodes: None enlarged.    Stomach and bowel: The stomach is normal.  Loops of small and large bowel are normal in caliber without evidence for inflammation or obstruction.  There is colonic diverticulosis without evidence of acute diverticulitis.    Peritoneum and mesentery: No ascites or free intraperitoneal air.  No abdominal fluid collection.    Vasculature: There is moderate calcified atherosclerosis.  There is no aneurysm    Urinary bladder: No wall thickening.    Reproductive  organs: Prostate is mildly enlarged.    Body wall: Slight interval decreased size of the fluid collection within the left rectus muscle, now measuring approximately 4.8 x 3.6 cm, previously 5.1 x 3.9 cm.  There are bilateral fat containing inguinal hernias.  There is a fat containing umbilical hernia.    Musculoskeletal: No aggressive osseous lesion.  There are multilevel degenerative changes of the lumbar spine.                               X-Ray Chest AP Portable (Final result)  Result time 04/06/22 05:04:10    Final result by Denton Samayoa MD (04/06/22 05:04:10)                 Impression:      Cardiomegaly with bilateral edema.  AICD present.    No significant change from prior study.      Electronically signed by: Denton Samayoa MD  Date:    04/06/2022  Time:    05:04             Narrative:    EXAMINATION:  XR CHEST AP PORTABLE    CLINICAL HISTORY:  sob;    TECHNIQUE:  Single frontal view of the chest was performed.    COMPARISON:  03/28/2022.    FINDINGS:  AICD leads appear unchanged.  Right PICC line appears unchanged.    Cardiomegaly with bilateral edema.    Heart and lungs  appear unchanged when allowing for differences in technique and positioning.                                  Medications:  Reconciled Home Medications:      Medication List      START taking these medications    JARDIANCE 25 mg tablet  Generic drug: empagliflozin  Take 1 tablet (25 mg total) by mouth once daily.     spironolactone 25 MG tablet  Commonly known as: ALDACTONE  Take 1 tablet (25 mg total) by mouth once daily.        CHANGE how you take these medications    ENTRESTO 24-26 mg per tablet  Generic drug: sacubitriL-valsartan  Take 1 tablet by mouth 2 (two) times daily.  What changed: additional instructions        CONTINUE taking these medications    allopurinoL 100 MG tablet  Commonly known as: ZYLOPRIM  Take 2 tablets (200 mg total) by mouth once daily.     amiodarone 200 MG Tab  Commonly known as: PACERONE  TAKE 1 AND  1/2 TABLETS(300 MG) BY MOUTH EVERY DAY     aspirin 81 MG EC tablet  Commonly known as: ECOTRIN  Take 1 tablet (81 mg total) by mouth once daily.     atorvastatin 80 MG tablet  Commonly known as: LIPITOR  Take 1 tablet (80 mg total) by mouth once daily.     diphenhydrAMINE 25 mg capsule  Commonly known as: BENADRYL  Take 25 mg by mouth as needed for Allergies.     DOBUTamine 500 mg/250 mL (2,000 mcg/mL)  Commonly known as: DOBUTREX  2.5 mcg/kg/min  Patient is 95.7 kg     ondansetron 4 MG Tbdl  Commonly known as: ZOFRAN-ODT  Dissolve 1 tablet (4 mg total) by mouth every 6 (six) hours as needed (nausea).     oxyCODONE-acetaminophen 5-325 mg per tablet  Commonly known as: PERCOCET  Take 1 tablet by mouth every 6 (six) hours as needed for Pain.     simethicone 80 MG chewable tablet  Commonly known as: MYLICON  Take 1 tablet (80 mg total) by mouth every 6 (six) hours as needed for Flatulence.        STOP taking these medications    cefpodoxime 200 MG tablet  Commonly known as: VANTIN     clopidogreL 75 mg tablet  Commonly known as: PLAVIX     colchicine 0.6 mg tablet  Commonly known as: COLCRYS     furosemide 40 MG tablet  Commonly known as: LASIX     ipratropium 0.02 % nebulizer solution  Commonly known as: ATROVENT     metoprolol succinate 25 MG 24 hr tablet  Commonly known as: TOPROL-XL     midodrine 2.5 MG Tab  Commonly known as: PROAMATINE     paricalcitoL 1 MCG capsule  Commonly known as: ZEMPLAR     predniSONE 20 MG tablet  Commonly known as: DELTASONE        ASK your doctor about these medications    mupirocin 2 % ointment  Commonly known as: BACTROBAN  Apply to both nares twice daily for 2 days  Ask about: Should I take this medication?     vancomycin 125 MG capsule  Commonly known as: VANCOCIN  Take 1 capsule (125 mg total) by mouth 4 (four) times daily. for 14 days  Ask about: Should I take this medication?            Indwelling Lines/Drains at time of discharge:   Lines/Drains/Airways     Peripherally Inserted  Central Catheter Line  Duration           PICC Double Lumen right basilic -- days                Time spent on the discharge of patient: 45 minutes         Grant Zamorano MD  Department of Hospital Medicine  [facility] - Cardiology Stepdown

## 2022-05-18 NOTE — LETTER
May 18, 2022        Isiah Adrian Jr.  [address]  [address], [address]  [place] [address]  Phone: [phone]  Fax: [phone]             [id number]  [address]  [place] [address]  Phone: [phone]   Patient: Audrey Oneil Jr.   MR Number: [id number]   YOB: 1950   Date of Visit: 5/18/2022       Dear Dr. Isiah Adrian Jr.    Thank you for referring Audrey Oneil to me for evaluation. Attached you will find relevant portions of my assessment and plan of care.    If you have questions, please do not hesitate to call me. I look forward to following Audrey Oneil along with you.    Sincerely,    Migue Henry Jr, MD    Enclosure    If you would like to receive this communication electronically, please contact [email or web address] or [phone] to request [employer] access.    [employer] is a tool which provides read-only access to select patient information with whom you have a relationship. Its easy to use and provides real time access to review your patients record including encounter summaries, notes, results, and demographic information.    If you feel you have received this communication in error or would no longer like to receive these types of communications, please e-mail [email or web address]

## 2022-05-18 NOTE — PROGRESS NOTES
"Subjective:     Patient ID:  Audrey Oneil Jr. is a 71 y.o. male who presents for follow-up of Heart Transplant Pre-evaluation (VAD only)    HPI:  72 yo WM last seen 7/2/21 refused to consider LVAD at that time.  In Nauary 2022 he developed recurrent VT (he thinks had MI) and cardiogenic shock for which hosp at [facility].  He changed his mind while in extremis about LVAD and I accepted in transfer.  While awaiting hospital bed he was transferred to [facility] and was treated for cardiogenic shock there and subsequently sent home on .  He remains on  and now wants to pursue evaluation for LVAD.  He reports told at [facility] that they could not place IABP due to aneurysm.  He had CT abd and pelvis 4/6/22 and report specifically says no aneurysm.     He was in WC last visit but today walking and has made great progress.      Cannot tolerate higher doses of meds due to low BP and symptomatic.    He reports that he went for routine check of his stressing and CT weak and lightheaded.  Blood pressure within the 60 systolic so he was sent to the emergency department.  They diagnosed him as having dehydration and gave him some fluid.  They also told him to no longer follow a fluid restriction and to let his weight go from 207 lb to 212 lb.  They furthermore reduced his fluid medication to once daily from twice daily.  He has done better on these changes but has not even taken the diuretic every day miss 2 days so far and has only been out of the emergency room since May 13th.  He is trying to self adjust the medications to correct "dehydration.    While he still has some weakness he has feels much stronger.  His home weight now runs 211-212 lb up fro 207-208 lb.     Review of Systems   Constitutional: Positive for malaise/fatigue and weight loss. Negative for chills, fever, night sweats and weight gain.   Cardiovascular: Positive for dyspnea on exertion. Negative for chest pain, irregular heartbeat, leg swelling (none now), " "near-syncope, orthopnea, palpitations, paroxysmal nocturnal dyspnea and syncope.   Respiratory: Positive for cough, sleep disturbances due to breathing (intolerant to CPAP), snoring and sputum production. Negative for wheezing.    Hematologic/Lymphatic: Bruises/bleeds easily ( bruising).   Musculoskeletal: Positive for back pain, joint pain, muscle weakness and stiffness. Negative for arthritis.   Gastrointestinal: Negative for change in bowel habit, hematochezia and melena.        Colonoscopy last performed at the [facility] he is not certain when.  Believes he had a polyp   Genitourinary: Negative for hematuria.   Neurological: Positive for dizziness, light-headedness, numbness (Left arm and somewhat involving the right leg) and weakness. Negative for brief paralysis, focal weakness and seizures.     Objective:   Physical Exam  Constitutional:       General: He is not in acute distress.     Appearance: He is well-developed. He is not diaphoretic.      Comments: BP (!) 118/57 (BP Location: Left arm, Patient Position: Sitting, BP Method: Medium (Automatic))   Pulse 88   Ht 5' 6" (1.676 m)   Wt 88 kg (194 lb 0.1 oz)   BMI 31.31 kg/m²   Last visit wt 210 lb and on 7/2/22 was 238#   HENT:      Head: Normocephalic and atraumatic.   Eyes:      General: No scleral icterus.        Right eye: No discharge.         Left eye: No discharge.      Conjunctiva/sclera: Conjunctivae normal.   Neck:      Thyroid: No thyromegaly.      Vascular: No JVD (just at 10 cm).      Trachea: No tracheal deviation.   Cardiovascular:      Rate and Rhythm: Normal rate and regular rhythm.      Heart sounds: Murmur (Grade 1/6 systolic murmur base and LSB) heard.     No gallop.   Pulmonary:      Effort: Pulmonary effort is normal.      Breath sounds: Normal breath sounds.   Abdominal:      General: Bowel sounds are normal. There is no distension.      Palpations: Abdomen is soft.      Tenderness: There is no abdominal tenderness.      " Comments: Liver 10-12 cm   Musculoskeletal:         General: No swelling or tenderness.      Right lower leg: No edema.      Left lower leg: No edema.   Skin:     General: Skin is warm and dry.   Neurological:      General: No focal deficit present.      Mental Status: He is alert and oriented to person, place, and time. Mental status is at baseline.      Motor: No weakness.   Psychiatric:         Mood and Affect: Mood normal.         Behavior: Behavior normal.         Thought Content: Thought content normal.         Judgment: Judgment normal.        Lab Results   Component Value Date     05/18/2022    K 4.4 05/18/2022     05/18/2022    CO2 20 (L) 05/18/2022    BUN 31 (H) 05/18/2022    CREATININE 1.5 (H) 05/18/2022    CALCIUM 9.6 05/18/2022    ANIONGAP 12 05/18/2022    ESTGFRAFRICA 54.1 (A) 05/18/2022    EGFRNONAA 46.8 (A) 05/18/2022     Lab Results   Component Value Date     (H) 05/18/2022     (H) 05/13/2022     (H) 05/06/2022      MG 2.3 05/18/2022    HGBA1C 5.7 (H) 05/18/2022    AST 19 05/18/2022    ALT 21 05/18/2022    ALBUMIN 4.2 05/18/2022    PROT 7.5 05/18/2022    BILITOT 0.5 05/18/2022    WBC 7.30 05/18/2022    HGB 13.1 (L) 05/18/2022    HCT 42.1 05/18/2022    HCT 42 04/25/2022     05/18/2022    INR 1.0 05/18/2022     05/18/2022    TSH 3.211 05/18/2022    CHOL 145 05/18/2022    HDL 50 05/18/2022    LDLCALC 57.8 (L) 05/18/2022    TRIG 186 (H) 05/18/2022         6 MWT today:  259.25 m.  Reported hip pain; no desaturation  Assessment:     1. Chronic combined systolic and diastolic congestive heart failure    2. Cardiomyopathy, ischemic    3. Coronary artery disease involving native coronary artery of native heart without angina pectoris    4. Hypertensive cardiovascular-renal disease, stage 1-4 or unspecified chronic kidney disease, with heart failure    5. Mixed hyperlipidemia    6. Ventricular tachycardia    7. Long term current use of amiodarone    8. Obesity  (BMI 30.0-34.9)    9. Presence of cardiac resynchronization therapy defibrillator (CRT-D)      Plan:   I have instructed him to resume taking his diuretic once daily.  I have also instructed him to continue his fluid restriction.  If he feels he needs to take an additional dose of diuretic he is free to do so but needs to let us know this is a recurrent event or fails to control volume within 24-48 hours.  If he feels that he needs to reduce his diuretic he can do so but needs to inform us of this as well.  I have asked him to not self medicate at this point without hour input though he certainly has latitude to make changes at night or over holiday and just touch base the next work day.    Complete evaluation for LVAD since he is now interested in pursuing  Needs to be seen monthly for now as on  and being evaluated for LVAD  AMIODARONE FOLLOW-UP:   CXR yearly--get soon   CMP every 6 months--get soon   TSH every 6 months--get soon

## 2022-05-18 NOTE — HOSPITAL COURSE
Treated for CHF exacerbation with IV lasix and transitioned to oral lasix. Also completed prior c diff treatment with vancomycin course.

## 2022-05-18 NOTE — PROGRESS NOTES
Left Ventricular Assist Device (LVAD) Recipient Adult Psychosocial Assessment    Encounter Date: 2022    Audrey Oneil  [address] [place] [id number]     Telephone Information:   Mobile [phone]   Home  [phone] (home)  Work  [phone] (work)  E-mail  [email or web address]    Sex: male  YOB: 1950  Age: 71 y.o.    [place] Citizen: yes  Primary Language: English   Needed: no    Emergency Contact:  Name: Pam Saucedo  Relationship: significant other of 30 years  Address: [place] [place]  Phone Numbers:     Family/Social Support:   Number of dependents/: pt denies  Marital history: pt  2x in the past; one marriage ended in divorce, and one marriage ended pt spouse . Pt has been in a relationship with Pam Saucedo for 30 years.  Other family dynamics: Pt has 2 adult sons: Vito Oneil and Audrey Oneil III. Pt has 2 sisters.     Household Composition: pt lives in family home with brother in law and sister Clarissa [facility],  [phone]. Pt reports Clarissa is only there sometimes due to she and his brother in law are .    Do you and your caregivers have access to reliable transportation? yes, Pam and Clarissa both drive. Pt no longer drives.     PRIMARY CAREGIVER:    Pam Saucedo, pt's long-term SO, will be primary caregiver, phone number [phone].     provided in-depth information to Patient regarding  regarding pre- and post-LVAD caregiver role.      Patient states understanding all aspects of caregiver role/commitment.    Patient verbalizes understanding of the education provided today and caregiver responsibilities.       remains available. Patient agree to contact  in a timely manner if concerns arise.      Able to take time off work without financial concerns:  unknown .     Pam was with pt during appts earlier in the day but per pt she had kidney stents yesterday and today started to have  pain and bleeding so pt took her to the ED. Pt voices understanding he will have to bring Pam to his next appts to meet with SW.    Additional Significant Others who will Assist with LVAD  No back ups are required for LVAD pts.    Living Will: no  Healthcare Power of : no  Advance Directives on file: <<no information> per medical record.  Verbally reviewed LW/HCPA information.   provided patient with copy of LW/HCPA documents and provided education on completion of forms. Pt states he will soon engage an  to make Pam his HCPA.    Living Donors: N/A    Highest Education Level: Attended College/Technical School     Reading Ability: college     Reports difficulty with: seeing and hearing-pt reports his hearing is bad and states he wears 225 strength OTC reading glasses. He reports he has not been able to replace the prescription glasses he lost right after [place].     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 He also had his first MI at this time.    Monthly Income:  SS Disability: $1100  Food [place]: $20      Pt reports Disability will go up to $1300 in January    Able to afford all costs now and if receives LVAD, including medications: yes  Pt reports secure power source? yes  Pt reports ability to afford monthly electric bill? yes  Pt reports ability to afford LVAD dressing supplies? yes     Patient verbalizes understanding of personal responsibilities related to LVAD costs and the importance of having a financial plan to ensure that patients LVAD costs are fully covered.       provided fundraising information/education.  Patient verbalizes understanding. Social  worker remains available.    Insurance:   Payer/Plan Subscr  Sex Relation Sub. Ins. ID Effective Group Num   1. PATTI QUICK* PEPE LICONA * 1950 Male Self [id number]                                   [address]     Primary Insurance (for UNOS reporting): Public Insurance - Medicare FFS (Fee For Service)  Secondary Insurance (for UNOS reporting): None     Patient verbalizes clear understanding that patient may experience difficulty obtaining and/or be denied insurance coverage post-surgery. This includes and is not limited to disability insurance, life insurance, health insurance, burial insurance, long term care insurance, and other insurances.      Patient also reports understanding that future health concerns related to or unrelated to LVAD may not be covered by patient's insurance.  Resources and information provided and reviewed.     Patient provides verbal permission to release any necessary information to outside resources for patient care and discharge planning.  Resources and information provided are reviewed.        Infusion Service: patient utilizing? no     Home Health: patient utilizing? no     DME: yes, O2, CPAP, BSC, walker and wheelchair (does not use the last two often), is waiting on a shower chair to be delivered.     Pulmonary/Cardiac Rehab:  pt denies, but reports he goes to outpt PT on [address] for back and neck problems  Dialysis History: no  ADLS:  pt reports independence and reports he no longer drives. Of note, pt was using a wheelchair when he came to clinic in the afternoon. Pt reports this was due to the testing he did today, including have 22 vials of blood drawn, and to having to wheel his SO to the ED.     Adherence:   Pt reports a high level of adherence to pt's medical regimen.  Adherence education and counseling provided.     Per History Section:  Past Medical History:   Diagnosis Date    Acute hypoxemic respiratory failure 2015    Acute  idiopathic gout of left knee 2019    Acute idiopathic gout of right elbow 2021    AICD (automatic cardioverter/defibrillator) present 2015     Anticoagulant long-term use     Bronchopneumonia 2021    CHF (congestive heart failure)     Chronic anticoagulation 2016    Chronic combined systolic and diastolic heart failure 2012    EF 10-20% on ECHO     Chronic gout 2019    Clotting disorder     Coronary artery disease involving native coronary artery of native heart without angina pectoris 2012    Cath 10- Stents patent non-obstructive disease Cath  non-obstructive disease     COVID-19 2021    Had antibody infusion    Diverticulosis of colon     DVT (deep venous thrombosis), unspecified laterality 2015    Dysphonia 2018    Essential hypertension 11/15/2015    Fine motor impairment 2021    Hyperlipidemia     Hypertensive heart disease with heart failure 2016    MI (myocardial infarction) 2009    x's 5    Nicotine abuse     Obesity 2012    Olecranon bursitis of right elbow 2021    Pulmonary embolism 2011    Renal disorder     LAVERNE    Right carpal tunnel syndrome 2018    Stented coronary artery 2012    LAD stent placed 10/17/2007     Trigger thumb of right hand 2018     Social History     Tobacco Use    Smoking status: Former Smoker     Packs/day: 1.00     Years: 45.00     Pack years: 45.00     Types: Cigarettes     Quit date: 2005     Years since quittin.4    Smokeless tobacco: Never Used    Tobacco comment: 1-1.5 ppd every day.   Substance Use Topics    Alcohol use: No     Social History     Substance and Sexual Activity   Drug Use No     Social History     Substance and Sexual Activity   Sexual Activity Never       Per Today's Psychosocial:  Tobacco:  Smoked 2ppd for 45 years, no use for over 16 years.    Alcohol: none, patient denies any use.    Illicit  Drugs/Non-prescribed Medications: none, patient denies any use.    Patient states clear understanding of the potential impact of substance use as it relates to LVAD candidacy and is aware of possible random substance screening.  Substance abstinence/cessation counseling, education and resources provided and reviewed.     Arrests/DWI/Treatment/Rehab: patient denies    Psychiatric History:    Mental Health:  pt denies    Psychiatrist/Counselor: pt denies  Medications:  pt denies  Suicide/Homicide Issues: pt denies past or current SI/HI  Safety at home: no concerns voiced by pt at this time.    Knowledge: Patient states having clear understanding and realistic expectations regarding the potential risks and potential benefits LVAD implantation agrees to discuss with health care team members and support system members, as well as to utilize available resources and express questions and/or concerns in order to further facilitate the pt informed decision-making.  Resources and information provided and reviewed.     Patient is aware of [facility]'s affiliation and/or partnership with agencies in home health care, LTAC, SNF, [facility], and other hospitals and clinics.    Understanding: Patient reports having a clear understanding of the many lifetime commitments involved with being an LVAD recipient, including costs, compliance, medications, lab work, procedures, appointments, concrete and financial planning, preparedness, timely and appropriate communication of concerns, abstinence (ETOH, tobacco, illicit non-prescribed drugs), adherence to all health care team recommendations, support system and caregiver involvement, appropriate and timely resource utilization and follow-through, mental health counseling as needed/recommended, and patient and caregiver responsibilities.  Social Service Handbook, resources and detailed educational information provided and reviewed.  Educational information provided.    Patient also reports current  "and expected compliance with health care regime and states having a clear understanding of the importance of compliance.       Patient reports a clear understanding that risks and benefits may be involved with LVAD heart failure treatment.     Patient also reports clear understanding that psychosocial risk factors may affect patient, and include but are not limited to feelings of depression, generalized anxiety, anxiety regarding dependence on others, post traumatic stress disorder, feelings of guilt and other emotional and/or mental concerns, and/or exacerbation of existing mental health concerns.  Detailed resources provided and discussed.      Patient agrees to access appropriate resources in a timely manner as needed and/or as recommended, and to communicate concerns appropriately.     Patient also reports a clear understanding of treatment options available.      Feelings or Concerns: pt reports he was at first scared when told his chest would be left open for a period of time post-implant. He reports he is no longer concerned about that and reports no other concerns at this time.    Coping: Identify Patient & Caregiver Strategies to [place]:   1. 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 Pt also reports he used to like to go spelunking and scuba diving. 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 Pt reports he enjoys going out to eat.     2. At the time of surgery - watch TV     3. During post-Transplant & Recovery Period - activities as allowed by medical team      Goals: breathe and be more active.      Interview Behavior: Patient presents as alert and oriented x 4, pleasant, good eye contact, calm, communicative, cooperative, and asking and answering questions appropriately.         Transplant Social Work - Candidacy  Assessment/Plan:     Psychosocial Suitability: Patient presents as not a suitable candidate for " LVAD at this time, pending SW meet with pt and caregiver at pt's next appts in June. Based on psychosocial risk factors, patient presents as medium risk, due to limited income and SW has not yet met with caregiver .    Recommendations/Additional Comments: informed coordinator pt  needs to bring caregiver to next appts.    Makenna Crocker LCSW

## 2022-05-18 NOTE — ASSESSMENT & PLAN NOTE
Patient presenting with dyspnea setting of holding diuresis from previous hospital admission.  - BMP 3100  - Home: Lasix 40mg  - s/p Lasix 40mg in the ED  - Holding metoprolol, spironolactone  - Cardiology consulted  - Continue home Dobutamine 2.5  - Continue Entresto   - Lasix 40mg IV BID  - Daily standing weight   - Strict input/output  - Daily BMP  - Mg>2, K>4  - Telemetry

## 2022-05-18 NOTE — PROGRESS NOTES
TRANSPLANT NUTRITIONAL ASSESSMENT    Referring Provider: January Khan MD    Reason for Visit: Pre-heart transplant work-up    Age: 71 y.o.  Sex: male    Patient Active Problem List   Diagnosis    Coronary artery disease involving native coronary artery of native heart without angina pectoris    Obesity (BMI 30.0-34.9)    Cardiomyopathy, ischemic    Hx pulmonary embolism 2010 provoked dvt     Postural dizziness with presyncope    History of DVT (deep vein thrombosis)    Ventricular tachycardia    Hypertensive cardiovascular-renal disease, stage 1-4 or unspecified chronic kidney disease, with heart failure    Presence of cardiac resynchronization therapy defibrillator (CRT-D)    Mixed hyperlipidemia    Long term current use of amiodarone    Thoracic aortic atherosclerosis    Stage 3a chronic kidney disease    Prediabetes    LBBB (left bundle branch block)    Elevated troponin I level    Gout    Chronic combined systolic and diastolic congestive heart failure    Essential hypertension    H/O ventricular tachycardia    LAVERNE (acute kidney injury)    Vitamin D deficiency    Blue skin    MRSA infection    Rotator cuff syndrome    BMI 38.0-38.9,adult    Severe sepsis    Clostridium difficile infection    E. coli UTI    Pressure injury of heel, stage 2    Dermatitis associated with moisture    Skin breakdown     Past Medical History:   Diagnosis Date    Acute hypoxemic respiratory failure 11/7/2015    Acute idiopathic gout of left knee 12/2/2019    Acute idiopathic gout of right elbow 9/23/2021    AICD (automatic cardioverter/defibrillator) present 12/13/2015      Anticoagulant long-term use     Bronchopneumonia 12/20/2021    CHF (congestive heart failure)     Chronic anticoagulation 5/5/2016    Chronic combined systolic and diastolic heart failure 11/26/2012    EF 10-20% on ECHO 2013    Chronic gout 12/2/2019    Clotting disorder     Coronary artery disease involving native  coronary artery of native heart without angina pectoris 2012    Cath 10- Stents patent non-obstructive disease Cath  non-obstructive disease     COVID-19 2021    Had antibody infusion    Diverticulosis of colon     DVT (deep venous thrombosis), unspecified laterality 2015    Dysphonia 2018    Essential hypertension 11/15/2015    Fine motor impairment 2021    Hyperlipidemia     Hypertensive heart disease with heart failure 2016    MI (myocardial infarction) 2009    x's 5    Nicotine abuse     Obesity 2012    Olecranon bursitis of right elbow 2021    Pulmonary embolism     Renal disorder     LAVERNE    Right carpal tunnel syndrome 2018    Stented coronary artery 2012    LAD stent placed 10/17/2007     Trigger thumb of right hand 2018     Lab Results   Component Value Date     2022    K 4.6 2022    PHOS 4.6 (H) 2022    MG 2.1 2022    CHOL 161 2021    HDL 50 2021    TRIG 130 2021    ALBUMIN 4.0 2022    AMMONIA 42 2017    HGBA1C 5.7 (H) 2021    CALCIUM 9.4 2022     Other Pertinent Labs: BUN: 31 (H), creat: 1.5 (H), GFR: 46.8 (L), TA (H), Uric acid: 7.3 (H)    Current Outpatient Medications   Medication Sig    allopurinoL (ZYLOPRIM) 100 MG tablet Take 2 tablets (200 mg total) by mouth once daily.    amiodarone (PACERONE) 200 MG Tab TAKE 1 AND 1/2 TABLETS(300 MG) BY MOUTH EVERY DAY    aspirin (ECOTRIN) 81 MG EC tablet Take 1 tablet (81 mg total) by mouth once daily.    atorvastatin (LIPITOR) 80 MG tablet Take 1 tablet (80 mg total) by mouth once daily.    diphenhydrAMINE (BENADRYL) 25 mg capsule Take 25 mg by mouth as needed for Allergies.    DOBUTamine (DOBUTREX) 500 mg/250 mL (2,000 mcg/mL) 2.5 mcg/kg/min  Patient is 95.7 kg    empagliflozin (JARDIANCE) 25 mg tablet Take 1 tablet (25 mg total) by mouth once daily.    furosemide (LASIX) 40 MG tablet  "Take 1 tablet (40 mg total) by mouth 2 (two) times a day.    ondansetron (ZOFRAN-ODT) 4 MG TbDL Dissolve 1 tablet (4 mg total) by mouth every 6 (six) hours as needed (nausea).    OPW TEST CLAIM - DO NOT FILL OPW test claim. Do not fill. (Patient not taking: Reported on 4/28/2022)    oxyCODONE-acetaminophen (PERCOCET) 5-325 mg per tablet Take 1 tablet by mouth every 6 (six) hours as needed for Pain.    sacubitriL-valsartan (ENTRESTO) 24-26 mg per tablet Take 1 tablet by mouth 2 (two) times daily.    simethicone (MYLICON) 80 MG chewable tablet Take 1 tablet (80 mg total) by mouth every 6 (six) hours as needed for Flatulence.    spironolactone (ALDACTONE) 25 MG tablet Take 1 tablet (25 mg total) by mouth once daily.     No current facility-administered medications for this visit.     Allergies: Iodine containing multivitamin, Keflex [cephalexin], Peaches [peach (prunus persica)], Shellfish containing products, Fig tree, and Tuberculin spenser test ppd    Ht Readings from Last 1 Encounters:   05/09/22 5' 7" (1.702 m)     Wt Readings from Last 1 Encounters:   05/09/22 93 kg (205 lb)      BMI: There is no height or weight on file to calculate BMI.    Usual Weight: 207-211 reported by pt   Weight Change/Time: none  Current Diet: ROBIN  Appetite/Current Intake: good   Exercise/Physical Activity: active around the house   Nutritional/Herbal Supplements: none  Potential Food/Medication Interactions: Atorvastatin - avoid grapefruit  Chewing/Swallowing Problems: none  Symptoms: none  Assessment of Lab Values: related to multiple cardiac events  Support System: daughter, sister    ABW: 72kg (160 lbs)  Estimated Kcal Need: 2026 kcals (28 kcals/kg ABW)  Estimated Protein Need: 77 g (0.8 g/kg CBW)    Nutritional History: Breakfast: 2 eggs and grits, lunch is cereal (nilsa krisp, froot loops, kashi go lean) w/ low fat yogurt and 2%% milk. Dinner is a meals on wheels but pt will eat out 3x a week and will be a burger or [place] " (stew and baked fish with salad). Pt drinks four 16 oz armas, one 16oz sweet tea, 6oz of coffee.     Nutritional Diagnoses  Problem: overweight/obesity  Etiology: excessive calorie intake   Symptoms: nutr hist     Educational Need? yes  Barriers: none identified  Discussed with: patient  Interventions: Patient taught nutrition information regarding Pre-heart transplant work-up.  Limit to 600mg per meal along with 2000ml of fluid per day.   Goals/Recommendations: diet adherence  Actions Taken: instruct/provide written information  Strategies Used: problem solving, goal setting, motivational interviewing  Patient and/or family comprehend instructions: yes , adherence expected  Outcome: Verbalizes understanding  Monitoring: Contact information provided, will f/u in clinic and communicate with the care team as needed.     Counseling Time: 15 minutes

## 2022-05-18 NOTE — ASSESSMENT & PLAN NOTE
- Patient with current C diff treatment and recently completed steroids for gout flare.   - WBC 18 on admission. Afebrile.  - Monitor leukocytosis

## 2022-05-20 NOTE — PROCEDURES
Audrey Oneil Jr. is a 71 y.o.  male patient, who presents for a 6 minute walk test ordered by MD Rolando.  The diagnosis is Pre Heart Transplant.  The patient's BMI is 34.1 kg/m2.  Predicted distance (lower limit of normal) is 316.84 meters.      Test Results:    The test was completed without stopping.    The total time walked was 360 seconds.  During walking, the patient reported:  Leg pain (Hip pain). The patient used no assistive devices  during testing.     5/18/2022---------Distance: 259.08 meters (850 feet)     O2 Sat % Supplemental Oxygen Heart Rate Blood Pressure Lily Scale   Pre-exercise  (Resting) 98 % Room Air 95 bpm 123/66 mmHg 3   During Exercise 98 % Room Air 106 bpm 118/56 mmHg 4   Post-exercise  (Recovery) 98 % Room Air  99 bpm   mmHg       Recovery Time: 80 seconds    Performing nurse/tech: Estopinal RRT      PREVIOUS STUDY:   The patient has not had a previous study.      CLINICAL INTERPRETATION:  Six minute walk distance is 259.08 meters (850 feet) with somewhat heavy dyspnea.  During exercise, there was no significant desaturation while breathing room air.  Both blood pressure and heart rate remained stable with walking.  The patient reported non-pulmonary symptoms during exercise.  Significant exercise impairment is likely due to subjective symptoms.  No previous study performed.  Based upon age and body mass index, exercise capacity is less than predicted.  
yes

## 2022-05-22 NOTE — ED NOTES
LOC: The patient is awake, alert and aware of environment with an appropriate affect, the patient is oriented x 3 and speaking appropriately.  APPEARANCE: Patient resting comfortably and in no acute distress, patient is clean and well groomed, patient's clothing is properly fastened.  SKIN: The skin is warm and dry, color consistent with ethnicity  MUSCULOSKELETAL: Patient moving all extremities spontaneouslyRESPIRATORY: Airway is open and patent, respirations are spontaneous, patient has a normal effort and rate  ABDOMEN: Soft and non tender to palpation, no distention noted  NEUROLOGIC:  facial expression is symmetrical, patient moving all extremities spontaneously, normal sensation in all extremities when touched with a finger.  Follows all commands appropriately.    Patient arrives from home with the complaint of hypotension. Patient states he took his bp at home several times and got low readings. Patient states he normally has a SBP around 80-90 but today it was in the 70s. Patient states he was feeling weak and dizzy. Patient has a picc line to right upper arm with continuous dobutamine running 24/7. Patient placed on cardiac monitoring, no acute distress noted, will continue to monitor

## 2022-05-22 NOTE — ED PROVIDER NOTES
"Encounter Date: 5/22/2022       History     Chief Complaint   Patient presents with    Hypotension     On home dobutamine pump in right arm PICC; "my pressure is always low but more than usual"    Weakness     71-year-old male with history of CHF (EF 10%, AICD, on dobutamine pump, awaiting LVAD), gout, CAD, hypertension, hyperlipidemia who presents emergency department for hypotension.  Patient reports for the past 2 days noted that his blood pressure will intermittently go in the 70/50s.  Denies any chest pain, shortness of breath or lower extremity swelling but states that when his blood pressure is low he feels weak. Was seen for similar complaint 2 weeks ago.         Review of patient's allergies indicates:   Allergen Reactions    Iodine containing multivitamin Swelling     itching    Keflex [cephalexin] Swelling     Eyes.  Tolerated multiple doses of zosyn and 1 dose of augmentin in 2015 and 2016, respectively    Peaches [peach (prunus persica)] Swelling     eyes    Shellfish containing products Swelling    Fig tree Swelling     itching    Tuberculin spenser test ppd Rash     Past Medical History:   Diagnosis Date    Acute hypoxemic respiratory failure 11/7/2015    Acute idiopathic gout of left knee 12/2/2019    Acute idiopathic gout of right elbow 9/23/2021    AICD (automatic cardioverter/defibrillator) present 12/13/2015      Anticoagulant long-term use     Bronchopneumonia 12/20/2021    CHF (congestive heart failure)     Chronic anticoagulation 5/5/2016    Chronic combined systolic and diastolic heart failure 11/26/2012    EF 10-20% on ECHO 2013    Chronic gout 12/2/2019    Clotting disorder     Coronary artery disease involving native coronary artery of native heart without angina pectoris 11/26/2012    Cath 10- Stents patent non-obstructive disease Cath 11-12015 non-obstructive disease     COVID-19 08/2021    Had antibody infusion    Diverticulosis of colon     DVT " (deep venous thrombosis), unspecified laterality 11/12/2015    Dysphonia 1/28/2018    Essential hypertension 11/15/2015    Fine motor impairment 2/2/2021    Hyperlipidemia     Hypertensive heart disease with heart failure 5/5/2016    MI (myocardial infarction) 2009    x's 5    Nicotine abuse     Obesity 11/26/2012    Olecranon bursitis of right elbow 9/19/2021    Pulmonary embolism 2011    Renal disorder     LAVERNE    Right carpal tunnel syndrome 4/6/2018    Stented coronary artery 11/26/2012    LAD stent placed 10/17/2007     Trigger thumb of right hand 4/6/2018     Past Surgical History:   Procedure Laterality Date    APPENDECTOMY      BACK SURGERY      CARDIAC DEFIBRILLATOR PLACEMENT      CARDIAC SURGERY  2007    stent    CARPAL TUNNEL RELEASE Right 04/2018    INSERTION OF BIVENTRICULAR IMPLANTABLE CARDIOVERTER-DEFIBRILLATOR (ICD) N/A 5/3/2019    Procedure: INSERTION, ICD, BIVENTRICULAR;  Surgeon: Teofilo Pal MD;  Location: [facility] EP LAB;  Service: Cardiology;  Laterality: N/A;  ICH CM,  ICD UPGD BI-V,  SJM, MAC, FAS, 3PREP (dual ICD INSITU)    r knee scope      REVISION OF SKIN POCKET FOR CARDIOVERTER-DEFIBRILLATOR Left 5/3/2019    Procedure: Revision, Skin Pocket, For Cardioverter-Defibrillator;  Surgeon: Teofilo Pal MD;  Location: [facility] EP LAB;  Service: Cardiology;  Laterality: Left;    RIGHT HEART CATHETERIZATION Right 6/14/2021    Procedure: INSERTION, CATHETER, RIGHT HEART;  Surgeon: Lizz Nieto MD;  Location: [facility] CATH LAB;  Service: Cardiology;  Laterality: Right;    SPINE SURGERY      TONSILLECTOMY      TRIGGER FINGER RELEASE Right 04/2018    thumb     Family History   Problem Relation Age of Onset    Cancer Mother         colon cancer    Heart disease Mother     Diabetes Mother     Heart disease Father     Stroke Father     Colon polyps Father     Cancer Paternal Grandmother         leukemia    No Known Problems Sister     No Known Problems Daughter      No Known Problems Son     No Known Problems Sister     No Known Problems Son      Social History     Tobacco Use    Smoking status: Former Smoker     Packs/day: 1.00     Years: 45.00     Pack years: 45.00     Types: Cigarettes     Quit date: 2005     Years since quittin.4    Smokeless tobacco: Never Used    Tobacco comment: 1-1.5 ppd every day.   Substance Use Topics    Alcohol use: No    Drug use: No     Review of Systems   Constitutional: Negative for chills and fever.   HENT: Negative for sore throat.    Eyes: Negative for pain and visual disturbance.   Respiratory: Negative for cough and shortness of breath.    Cardiovascular: Negative for chest pain.   Gastrointestinal: Negative for abdominal pain, nausea and vomiting.   Genitourinary: Negative for difficulty urinating and dysuria.   Musculoskeletal: Negative.    Skin: Negative.    Neurological: Positive for weakness.   Psychiatric/Behavioral: Negative for confusion.       Physical Exam     Initial Vitals   BP Pulse Resp Temp SpO2   22 1542 22 1538 22 1538 22 1538 22 1538   (S) (!) 74/44 95 18 97.7 °F (36.5 °C) 97 %      MAP       --                Physical Exam    Nursing note and vitals reviewed.  Constitutional: He appears well-developed and well-nourished. He is not diaphoretic. No distress.   HENT:   Head: Normocephalic and atraumatic.   Eyes: Conjunctivae are normal. Pupils are equal, round, and reactive to light.   Neck: Neck supple.   Normal range of motion.  Cardiovascular: Normal rate, regular rhythm, normal heart sounds and intact distal pulses. Exam reveals no gallop and no friction rub.    No murmur heard.  Pulmonary/Chest: Breath sounds normal.   Abdominal: Abdomen is soft. Bowel sounds are normal. He exhibits no distension and no mass. There is no abdominal tenderness. There is no rebound and no guarding.   Musculoskeletal:         General: Normal range of motion.      Cervical back: Normal  range of motion and neck supple.     Neurological: He is alert and oriented to person, place, and time. GCS score is 15. GCS eye subscore is 4. GCS verbal subscore is 5. GCS motor subscore is 6.   Skin: Skin is warm and dry. Capillary refill takes less than 2 seconds.   PICC line in the right upper extremity with dobutamine pump.   Psychiatric: He has a normal mood and affect.         ED Course   Procedures  Labs Reviewed   CBC W/ AUTO DIFFERENTIAL - Abnormal; Notable for the following components:       Result Value    RBC 4.39 (*)     Hemoglobin 12.6 (*)     Hematocrit 39.1 (*)     RDW 16.8 (*)     All other components within normal limits   COMPREHENSIVE METABOLIC PANEL - Abnormal; Notable for the following components:    BUN 35 (*)     Creatinine 1.6 (*)     eGFR if  50.1 (*)     eGFR if non  43.3 (*)     All other components within normal limits   TROPONIN I - Abnormal; Notable for the following components:    Troponin I 0.083 (*)     All other components within normal limits   B-TYPE NATRIURETIC PEPTIDE - Abnormal; Notable for the following components:     (*)     All other components within normal limits   LACTIC ACID, PLASMA        ECG Results          EKG 12-lead (Final result)  Result time 05/22/22 20:49:31    Final result by Interface, Lab In [facility] (05/22/22 20:49:31)                 Narrative:    Test Reason : I95.9,    Vent. Rate : 077 BPM     Atrial Rate : 077 BPM     P-R Int : 150 ms          QRS Dur : 182 ms      QT Int : 492 ms       P-R-T Axes : 058 174 015 degrees     QTc Int : 556 ms    Probably A sensed and V paced  Abnormal ECG  When compared with ECG of 13-MAY-2022 19:19,  No major change  Confirmed by Edilberto WINTER MD ([id number]) on 5/22/2022 8:49:22 PM    Referred By: [email or web address]   SELF           Confirmed By:Edilberto WINTER MD                            Imaging Results          X-Ray Chest AP Portable (Final result)  Result time 05/22/22 16:48:39    Final result by  Lorrie Cast MD (05/22/22 16:48:39)                 Impression:      No acute abnormality or detrimental change since April 25, 2022.      Electronically signed by: Lorrie Cast MD  Date:    05/22/2022  Time:    16:48             Narrative:    EXAMINATION:  XR CHEST AP PORTABLE    CLINICAL HISTORY:  hypotension;    TECHNIQUE:  Single frontal view of the chest was performed.    COMPARISON:  April 25, 2022    FINDINGS:  Pacing device and right upper extremity PICC remain.The lungs are free of lobar consolidation and alveolar edema, with normal appearance of pulmonary vasculature and no large pleural effusion or pneumothorax.    The cardiac silhouette is exaggerated by AP portable technique and not enlarged.  The hilar and mediastinal contours are unremarkable.    Visualized osseous structures are stable with degenerative changes of the spine and shoulders.                                 Medications - No data to display  Medical Decision Making:   History:   Old Medical Records: I decided to obtain old medical records.  Old Records Summarized: records from previous admission(s).  Clinical Tests:   Lab Tests: Ordered and Reviewed  Radiological Study: Ordered and Reviewed  Medical Tests: Ordered and Reviewed  Other:   I have discussed this case with another health care provider.       APC / Resident Notes:   71 y.o. year old male presenting with hypotension and weakness.  On exam patient is afebrile and nontoxic. Heart rate and rhythm are regular. Lungs with clear breath sounds throughout. Abdomen is soft, nontender. No edema.    DDx includes but is not limited to cardiogenic shock, CHF exacerbation, ACS, polypharmacy, LAVERNE    ED workup reveals BP stable in the -131 systolic.  EKG sinus rhythm.  Troponin is at patient's baseline, BNP also at baseline.  No LAVERNE.  Cardiology was formally consulted and SS patient suspects that this is medication related advised to hold patient's Jardiance and ensure that  patient is not taking his old metoprolol.  Discussed medication changes with patient and will have patient have close follow-up with Cardiology outpatient.  Do not suspect patient is in cardiogenic shock at this time.  Discussed return precautions with patient and patient verbalized agreement understanding with plan.    Discussed findings and plan with patient who verbalized understanding and agrees with the plan and course of treatment. Return to ED precautions discussed. Patient is stable for discharge. I discussed the care of this patient with my supervising physician.         Attending Attestation:     Physician Attestation Statement for NP/PA:   I discussed this assessment and plan of this patient with the NP/PA, but I did not personally examine the patient. The face to face encounter was performed by the NP/PA.              ED Course as of 05/22/22 2212   Sun May 22, 2022   1701 EKG by my independent interpretation is normal sinus rhythm, at a rate of 77, he has a right bundle-branch block that is persistent from prior. [EB]      ED Course User Index  [EB] Lillian Choi MD             Clinical Impression:   Final diagnoses:  [I95.9] Hypotension          ED Disposition Condition    Discharge Stable        ED Prescriptions     None        Follow-up Information    None          Tari Hendricks PA-C  05/22/22 2212       Lillian Choi MD  05/24/22 1721

## 2022-05-22 NOTE — HPI
Audrey Oneil is a 72 y/o M with ICM (EF 15%) on home  2.5 (previously refused LVAD, but now amenable), s/p St-Rodri CRT-D 5/2019, history of VT, HTN, HLD, CAD s/p MI who presents for lightheadedness and low blood pressure. Pt seen several times in the ED in the last month for pre-syncopal sx and low blood pressure.     Pt seen by Dr. Henry and instructed to resume his diuretic once daily. Since then pt has been compliant with his medications. Pt reports he took his Entresto, Lasix, Empagliflozin today around noon. About 2 hrs later pt felt lightheaded and chekced his BP which he noted to be low so he presented to the ED     BP in the 70s on arrival and improved to 130s with no intervention.  Cr was 1.6 (Baseline 1.5-1.9), Trop .083, , LA 1. BP improved to 131/67. Pt saturating well on RA, HR 70s-80s CXR unremarkable.      5/9/22 TTE   The left ventricle is severely enlarged with eccentric hypertrophy and severely decreased systolic function. The estimated ejection fraction is 15%.  There is left ventricular global hypokinesis.  Grade I left ventricular diastolic dysfunction.  Normal right ventricular size with normal right ventricular systolic function.  Mild left atrial enlargement.  Mild tricuspid regurgitation.  The estimated PA systolic pressure is 25 mmHg.  Normal central venous pressure (3 mmHg).

## 2022-05-23 NOTE — ASSESSMENT & PLAN NOTE
Continue GDMT with Entresto. Pt did not have Metoprolol in his medication bag, although he was unsure if he was taking it. Pt instructed to not take Metoprolol given he's on Dobutamine. Pt LA 1. BP now improved. Pt warm, and perfusing well.     -Pt instructed to stop taking Empaglizoflozin  -Can continue Lasix 40 mg daily and Entresto

## 2022-05-23 NOTE — DISCHARGE INSTRUCTIONS
Your seen in the emergency department for low blood pressure.  Your EKG and blood work did not show any abnormalities from your baseline.  You were seen by the cardiology team who recommends holding Jardiance and metoprolol with close follow-up in clinic.  Please monitor your blood pressure and return to the ED for any new worsening symptoms.

## 2022-05-23 NOTE — SUBJECTIVE & OBJECTIVE
Past Medical History:   Diagnosis Date    Acute hypoxemic respiratory failure 11/7/2015    Acute idiopathic gout of left knee 12/2/2019    Acute idiopathic gout of right elbow 9/23/2021    AICD (automatic cardioverter/defibrillator) present 12/13/2015      Anticoagulant long-term use     Bronchopneumonia 12/20/2021    CHF (congestive heart failure)     Chronic anticoagulation 5/5/2016    Chronic combined systolic and diastolic heart failure 11/26/2012    EF 10-20% on ECHO 2013    Chronic gout 12/2/2019    Clotting disorder     Coronary artery disease involving native coronary artery of native heart without angina pectoris 11/26/2012    Cath 10- Stents patent non-obstructive disease Cath 11-12015 non-obstructive disease     COVID-19 08/2021    Had antibody infusion    Diverticulosis of colon     DVT (deep venous thrombosis), unspecified laterality 11/12/2015    Dysphonia 1/28/2018    Essential hypertension 11/15/2015    Fine motor impairment 2/2/2021    Hyperlipidemia     Hypertensive heart disease with heart failure 5/5/2016    MI (myocardial infarction) 2009    x's 5    Nicotine abuse     Obesity 11/26/2012    Olecranon bursitis of right elbow 9/19/2021    Pulmonary embolism 2011    Renal disorder     LAVERNE    Right carpal tunnel syndrome 4/6/2018    Stented coronary artery 11/26/2012    LAD stent placed 10/17/2007     Trigger thumb of right hand 4/6/2018       Past Surgical History:   Procedure Laterality Date    APPENDECTOMY      BACK SURGERY      CARDIAC DEFIBRILLATOR PLACEMENT      CARDIAC SURGERY  2007    stent    CARPAL TUNNEL RELEASE Right 04/2018    INSERTION OF BIVENTRICULAR IMPLANTABLE CARDIOVERTER-DEFIBRILLATOR (ICD) N/A 5/3/2019    Procedure: INSERTION, ICD, BIVENTRICULAR;  Surgeon: Teofilo Pal MD;  Location: [facility] LAB;  Service: Cardiology;  Laterality: N/A;  ICH CM,  ICD UPGD BI-V,  SJM, MAC, FAS, 3PREP (dual ICD INSITU)    r knee scope      REVISION OF SKIN POCKET FOR  CARDIOVERTER-DEFIBRILLATOR Left 5/3/2019    Procedure: Revision, Skin Pocket, For Cardioverter-Defibrillator;  Surgeon: Teofilo Pal MD;  Location: [facility] EP LAB;  Service: Cardiology;  Laterality: Left;    RIGHT HEART CATHETERIZATION Right 6/14/2021    Procedure: INSERTION, CATHETER, RIGHT HEART;  Surgeon: Lizz Nieto MD;  Location: [facility] CATH LAB;  Service: Cardiology;  Laterality: Right;    SPINE SURGERY      TONSILLECTOMY      TRIGGER FINGER RELEASE Right 04/2018    thumb       Review of patient's allergies indicates:   Allergen Reactions    Iodine containing multivitamin Swelling     itching    Keflex [cephalexin] Swelling     Eyes.  Tolerated multiple doses of zosyn and 1 dose of augmentin in 2015 and 2016, respectively    Peaches [peach (prunus persica)] Swelling     eyes    Shellfish containing products Swelling    Fig tree Swelling     itching    Tuberculin spenser test ppd Rash       No current facility-administered medications on file prior to encounter.     Current Outpatient Medications on File Prior to Encounter   Medication Sig    allopurinoL (ZYLOPRIM) 100 MG tablet Take 2 tablets (200 mg total) by mouth once daily.    amiodarone (PACERONE) 200 MG Tab TAKE 1 AND 1/2 TABLETS(300 MG) BY MOUTH EVERY DAY    aspirin (ECOTRIN) 81 MG EC tablet Take 1 tablet (81 mg total) by mouth once daily.    atorvastatin (LIPITOR) 80 MG tablet Take 1 tablet (80 mg total) by mouth once daily.    diphenhydrAMINE (BENADRYL) 25 mg capsule Take 25 mg by mouth as needed for Allergies.    DOBUTamine (DOBUTREX) 500 mg/250 mL (2,000 mcg/mL) 2.5 mcg/kg/min  Patient is 95.7 kg    empagliflozin (JARDIANCE) 25 mg tablet Take 1 tablet (25 mg total) by mouth once daily.    furosemide (LASIX) 40 MG tablet Take 1 tablet (40 mg total) by mouth once daily. Take afternoon dose if needed    ondansetron (ZOFRAN-ODT) 4 MG TbDL Dissolve 1 tablet (4 mg total) by mouth every 6 (six) hours as needed (nausea).    OPW TEST CLAIM - DO NOT FILL  OPW test claim. Do not fill.    oxyCODONE-acetaminophen (PERCOCET) 5-325 mg per tablet Take 1 tablet by mouth every 6 (six) hours as needed for Pain.    sacubitriL-valsartan (ENTRESTO) 24-26 mg per tablet Take 1 tablet by mouth 2 (two) times daily.    simethicone (MYLICON) 80 MG chewable tablet Take 1 tablet (80 mg total) by mouth every 6 (six) hours as needed for Flatulence.    spironolactone (ALDACTONE) 25 MG tablet Take 1 tablet (25 mg total) by mouth once daily.    [DISCONTINUED] clopidogreL (PLAVIX) 75 mg tablet Take 1 tablet (75 mg total) by mouth once daily. (Patient not taking: Reported on 3/18/2022)    [DISCONTINUED] colchicine (COLCRYS) 0.6 mg tablet Take 1 tablet (0.6 mg total) by mouth once daily. (Patient not taking: Reported on 3/18/2022)    [DISCONTINUED] ipratropium (ATROVENT) 0.02 % nebulizer solution Take 2.5 mLs (500 mcg total) by nebulization 4 (four) times daily as needed for Wheezing. Rescue (Patient not taking: Reported on 3/29/2022)    [DISCONTINUED] metoprolol succinate (TOPROL-XL) 25 MG 24 hr tablet Take 1 tablet (25 mg total) by mouth once daily.    [DISCONTINUED] midodrine (PROAMATINE) 2.5 MG Tab Take 1 tablet (2.5 mg total) by mouth 3 (three) times daily. HOLD until follow up with cardiology     Family History       Problem Relation (Age of Onset)    Cancer Mother, Paternal Grandmother    Colon polyps Father    Diabetes Mother    Heart disease Mother, Father    No Known Problems Sister, Daughter, Son, Sister, Son    Stroke Father          Tobacco Use    Smoking status: Former Smoker     Packs/day: 1.00     Years: 45.00     Pack years: 45.00     Types: Cigarettes     Quit date: 2005     Years since quittin.4    Smokeless tobacco: Never Used    Tobacco comment: 1-1.5 ppd every day.   Substance and Sexual Activity    Alcohol use: No    Drug use: No    Sexual activity: Never     Review of Systems   Constitutional: Negative for fever and malaise/fatigue.   Eyes:  Negative for  blurred vision and pain.   Cardiovascular:  Negative for chest pain, dyspnea on exertion, leg swelling, orthopnea, palpitations and paroxysmal nocturnal dyspnea.   Respiratory:  Negative for cough, shortness of breath, sputum production and wheezing.    Hematologic/Lymphatic: Negative for adenopathy and bleeding problem.   Skin:  Negative for rash.   Musculoskeletal:  Negative for back pain and neck pain.   Gastrointestinal:  Negative for abdominal pain, constipation, diarrhea, nausea and vomiting.   Genitourinary:  Negative for dysuria.   Neurological:  Negative for dizziness, headaches, light-headedness and weakness.   Objective:     Vital Signs (Most Recent):  Temp: 97.7 °F (36.5 °C) (05/22/22 1538)  Pulse: 78 (05/22/22 1908)  Resp: 18 (05/22/22 1908)  BP: 131/67 (05/22/22 1908)  SpO2: 97 % (05/22/22 1908) Vital Signs (24h Range):  Temp:  [97.7 °F (36.5 °C)] 97.7 °F (36.5 °C)  Pulse:  [76-95] 78  Resp:  [18-20] 18  SpO2:  [97 %-100 %] 97 %  BP: ()/(44-72) 131/67     Weight: 90.7 kg (200 lb)  Body mass index is 32.28 kg/m².    SpO2: 97 %  O2 Device (Oxygen Therapy): room air    No intake or output data in the 24 hours ending 05/22/22 2028    Lines/Drains/Airways       Peripherally Inserted Central Catheter Line  Duration             PICC Double Lumen right basilic -- days              Peripheral Intravenous Line  Duration                  Peripheral IV - Single Lumen 05/22/22 1747 18 G Left Antecubital <1 day                    Physical Exam  Constitutional:       General: He is not in acute distress.     Appearance: Normal appearance. He is not ill-appearing, toxic-appearing or diaphoretic.   HENT:      Head: Normocephalic and atraumatic.      Nose: Nose normal.   Eyes:      Extraocular Movements: Extraocular movements intact.      Pupils: Pupils are equal, round, and reactive to light.   Cardiovascular:      Rate and Rhythm: Normal rate and regular rhythm.      Heart sounds: No murmur heard.    No friction  rub. No gallop.   Pulmonary:      Effort: Pulmonary effort is normal. No respiratory distress.      Breath sounds: Normal breath sounds. No wheezing or rales.   Abdominal:      General: Abdomen is flat. There is no distension.      Palpations: Abdomen is soft. There is no mass.      Tenderness: There is no abdominal tenderness.   Musculoskeletal:         General: No swelling. Normal range of motion.      Cervical back: Normal range of motion. No rigidity.      Right lower leg: No edema.      Left lower leg: No edema.   Skin:     General: Skin is warm and dry.      Coloration: Skin is not jaundiced.      Findings: No bruising.   Neurological:      General: No focal deficit present.      Mental Status: He is alert and oriented to person, place, and time.      Cranial Nerves: No cranial nerve deficit.      Motor: No weakness.       Significant Labs: BMP:   Recent Labs   Lab 05/22/22  1747   GLU 88      K 4.6      CO2 23   BUN 35*   CREATININE 1.6*   CALCIUM 9.2    and CBC   Recent Labs   Lab 05/22/22  1747   WBC 6.34   HGB 12.6*   HCT 39.1*          Significant Imaging: Reviewed

## 2022-05-23 NOTE — CONSULTS
[facility] - Emergency Dept  Cardiology  Consult Note    Patient Name: Audrey Oneil Jr.  MRN: [id number]  Admission Date: 5/22/2022  Hospital Length of Stay: 0 days  Code Status: Prior   Attending Provider: Lillian Choi MD   Consulting Provider: Curry Mendoza MD  Primary Care Physician: January Khan MD  Principal Problem:<principal problem not specified>    Patient information was obtained from patient and ER records.     Inpatient consult to Cardiology  Consult performed by: Curry Mendoza MD  Consult ordered by: Tari Hendricks PA-C        Subjective:     Chief Complaint:  Hypotension      HPI:   Audrey Oneil is a 70 y/o M with ICM (EF 15%) on home  2.5 (previously refused LVAD, but now amenable), s/p [facility] CRT-D 5/2019, history of VT, HTN, HLD, CAD s/p MI who presents for lightheadedness and low blood pressure. Pt seen several times in the ED in the last month for pre-syncopal sx and low blood pressure.     Pt seen by Dr. Henry and instructed to resume his diuretic once daily. Since then pt has been compliant with his medications. Pt reports he took his Entresto, Lasix, Empagliflozin today around noon. About 2 hrs later pt felt lightheaded and chekced his BP which he noted to be low so he presented to the ED     BP in the 70s on arrival and improved to 130s with no intervention.  Cr was 1.6 (Baseline 1.5-1.9), Trop .083, , LA 1. BP improved to 131/67. Pt saturating well on RA, HR 70s-80s CXR unremarkable.      5/9/22 TTE   · The left ventricle is severely enlarged with eccentric hypertrophy and severely decreased systolic function. The estimated ejection fraction is 15%.  · There is left ventricular global hypokinesis.  · Grade I left ventricular diastolic dysfunction.  · Normal right ventricular size with normal right ventricular systolic function.  · Mild left atrial enlargement.  · Mild tricuspid regurgitation.  · The estimated PA systolic pressure is 25 mmHg.  · Normal central venous  pressure (3 mmHg).        Past Medical History:   Diagnosis Date    Acute hypoxemic respiratory failure 11/7/2015    Acute idiopathic gout of left knee 12/2/2019    Acute idiopathic gout of right elbow 9/23/2021    AICD (automatic cardioverter/defibrillator) present 12/13/2015      Anticoagulant long-term use     Bronchopneumonia 12/20/2021    CHF (congestive heart failure)     Chronic anticoagulation 5/5/2016    Chronic combined systolic and diastolic heart failure 11/26/2012    EF 10-20% on ECHO 2013    Chronic gout 12/2/2019    Clotting disorder     Coronary artery disease involving native coronary artery of native heart without angina pectoris 11/26/2012    Cath 10- Stents patent non-obstructive disease Cath 11-12015 non-obstructive disease     COVID-19 08/2021    Had antibody infusion    Diverticulosis of colon     DVT (deep venous thrombosis), unspecified laterality 11/12/2015    Dysphonia 1/28/2018    Essential hypertension 11/15/2015    Fine motor impairment 2/2/2021    Hyperlipidemia     Hypertensive heart disease with heart failure 5/5/2016    MI (myocardial infarction) 2009    x's 5    Nicotine abuse     Obesity 11/26/2012    Olecranon bursitis of right elbow 9/19/2021    Pulmonary embolism 2011    Renal disorder     LAVERNE    Right carpal tunnel syndrome 4/6/2018    Stented coronary artery 11/26/2012    LAD stent placed 10/17/2007     Trigger thumb of right hand 4/6/2018       Past Surgical History:   Procedure Laterality Date    APPENDECTOMY      BACK SURGERY      CARDIAC DEFIBRILLATOR PLACEMENT      CARDIAC SURGERY  2007    stent    CARPAL TUNNEL RELEASE Right 04/2018    INSERTION OF BIVENTRICULAR IMPLANTABLE CARDIOVERTER-DEFIBRILLATOR (ICD) N/A 5/3/2019    Procedure: INSERTION, ICD, BIVENTRICULAR;  Surgeon: Teofilo Pal MD;  Location: [facility] EP LAB;  Service: Cardiology;  Laterality: N/A;  ICH CM,  ICD UPGD BI-V,  SJM, MAC, FAS, 3PREP (dual ICD  INSITU)    r knee scope      REVISION OF SKIN POCKET FOR CARDIOVERTER-DEFIBRILLATOR Left 5/3/2019    Procedure: Revision, Skin Pocket, For Cardioverter-Defibrillator;  Surgeon: Teofilo Pal MD;  Location: [facility] EP LAB;  Service: Cardiology;  Laterality: Left;    RIGHT HEART CATHETERIZATION Right 6/14/2021    Procedure: INSERTION, CATHETER, RIGHT HEART;  Surgeon: Lizz Nieto MD;  Location: [facility] CATH LAB;  Service: Cardiology;  Laterality: Right;    SPINE SURGERY      TONSILLECTOMY      TRIGGER FINGER RELEASE Right 04/2018    thumb       Review of patient's allergies indicates:   Allergen Reactions    Iodine containing multivitamin Swelling     itching    Keflex [cephalexin] Swelling     Eyes.  Tolerated multiple doses of zosyn and 1 dose of augmentin in 2015 and 2016, respectively    Peaches [peach (prunus persica)] Swelling     eyes    Shellfish containing products Swelling    Fig tree Swelling     itching    Tuberculin spenser test ppd Rash       No current facility-administered medications on file prior to encounter.     Current Outpatient Medications on File Prior to Encounter   Medication Sig    allopurinoL (ZYLOPRIM) 100 MG tablet Take 2 tablets (200 mg total) by mouth once daily.    amiodarone (PACERONE) 200 MG Tab TAKE 1 AND 1/2 TABLETS(300 MG) BY MOUTH EVERY DAY    aspirin (ECOTRIN) 81 MG EC tablet Take 1 tablet (81 mg total) by mouth once daily.    atorvastatin (LIPITOR) 80 MG tablet Take 1 tablet (80 mg total) by mouth once daily.    diphenhydrAMINE (BENADRYL) 25 mg capsule Take 25 mg by mouth as needed for Allergies.    DOBUTamine (DOBUTREX) 500 mg/250 mL (2,000 mcg/mL) 2.5 mcg/kg/min  Patient is 95.7 kg    empagliflozin (JARDIANCE) 25 mg tablet Take 1 tablet (25 mg total) by mouth once daily.    furosemide (LASIX) 40 MG tablet Take 1 tablet (40 mg total) by mouth once daily. Take afternoon dose if needed    ondansetron (ZOFRAN-ODT) 4 MG TbDL Dissolve 1 tablet (4 mg total) by  mouth every 6 (six) hours as needed (nausea).    OPW TEST CLAIM - DO NOT FILL OPW test claim. Do not fill.    oxyCODONE-acetaminophen (PERCOCET) 5-325 mg per tablet Take 1 tablet by mouth every 6 (six) hours as needed for Pain.    sacubitriL-valsartan (ENTRESTO) 24-26 mg per tablet Take 1 tablet by mouth 2 (two) times daily.    simethicone (MYLICON) 80 MG chewable tablet Take 1 tablet (80 mg total) by mouth every 6 (six) hours as needed for Flatulence.    spironolactone (ALDACTONE) 25 MG tablet Take 1 tablet (25 mg total) by mouth once daily.    [DISCONTINUED] clopidogreL (PLAVIX) 75 mg tablet Take 1 tablet (75 mg total) by mouth once daily. (Patient not taking: Reported on 3/18/2022)    [DISCONTINUED] colchicine (COLCRYS) 0.6 mg tablet Take 1 tablet (0.6 mg total) by mouth once daily. (Patient not taking: Reported on 3/18/2022)    [DISCONTINUED] ipratropium (ATROVENT) 0.02 % nebulizer solution Take 2.5 mLs (500 mcg total) by nebulization 4 (four) times daily as needed for Wheezing. Rescue (Patient not taking: Reported on 3/29/2022)    [DISCONTINUED] metoprolol succinate (TOPROL-XL) 25 MG 24 hr tablet Take 1 tablet (25 mg total) by mouth once daily.    [DISCONTINUED] midodrine (PROAMATINE) 2.5 MG Tab Take 1 tablet (2.5 mg total) by mouth 3 (three) times daily. HOLD until follow up with cardiology     Family History       Problem Relation (Age of Onset)    Cancer Mother, Paternal Grandmother    Colon polyps Father    Diabetes Mother    Heart disease Mother, Father    No Known Problems Sister, Daughter, Son, Sister, Son    Stroke Father          Tobacco Use    Smoking status: Former Smoker     Packs/day: 1.00     Years: 45.00     Pack years: 45.00     Types: Cigarettes     Quit date: 2005     Years since quittin.4    Smokeless tobacco: Never Used    Tobacco comment: 1-1.5 ppd every day.   Substance and Sexual Activity    Alcohol use: No    Drug use: No    Sexual activity: Never     Review of  Systems   Constitutional: Negative for fever and malaise/fatigue.   Eyes:  Negative for blurred vision and pain.   Cardiovascular:  Negative for chest pain, dyspnea on exertion, leg swelling, orthopnea, palpitations and paroxysmal nocturnal dyspnea.   Respiratory:  Negative for cough, shortness of breath, sputum production and wheezing.    Hematologic/Lymphatic: Negative for adenopathy and bleeding problem.   Skin:  Negative for rash.   Musculoskeletal:  Negative for back pain and neck pain.   Gastrointestinal:  Negative for abdominal pain, constipation, diarrhea, nausea and vomiting.   Genitourinary:  Negative for dysuria.   Neurological:  Negative for dizziness, headaches, light-headedness and weakness.   Objective:     Vital Signs (Most Recent):  Temp: 97.7 °F (36.5 °C) (05/22/22 1538)  Pulse: 78 (05/22/22 1908)  Resp: 18 (05/22/22 1908)  BP: 131/67 (05/22/22 1908)  SpO2: 97 % (05/22/22 1908) Vital Signs (24h Range):  Temp:  [97.7 °F (36.5 °C)] 97.7 °F (36.5 °C)  Pulse:  [76-95] 78  Resp:  [18-20] 18  SpO2:  [97 %-100 %] 97 %  BP: ()/(44-72) 131/67     Weight: 90.7 kg (200 lb)  Body mass index is 32.28 kg/m².    SpO2: 97 %  O2 Device (Oxygen Therapy): room air    No intake or output data in the 24 hours ending 05/22/22 2028    Lines/Drains/Airways       Peripherally Inserted Central Catheter Line  Duration             PICC Double Lumen right basilic -- days              Peripheral Intravenous Line  Duration                  Peripheral IV - Single Lumen 05/22/22 1747 18 G Left Antecubital <1 day                    Physical Exam  Constitutional:       General: He is not in acute distress.     Appearance: Normal appearance. He is not ill-appearing, toxic-appearing or diaphoretic.   HENT:      Head: Normocephalic and atraumatic.      Nose: Nose normal.   Eyes:      Extraocular Movements: Extraocular movements intact.      Pupils: Pupils are equal, round, and reactive to light.   Cardiovascular:      Rate and  Rhythm: Normal rate and regular rhythm.      Heart sounds: No murmur heard.    No friction rub. No gallop.   Pulmonary:      Effort: Pulmonary effort is normal. No respiratory distress.      Breath sounds: Normal breath sounds. No wheezing or rales.   Abdominal:      General: Abdomen is flat. There is no distension.      Palpations: Abdomen is soft. There is no mass.      Tenderness: There is no abdominal tenderness.   Musculoskeletal:         General: No swelling. Normal range of motion.      Cervical back: Normal range of motion. No rigidity.      Right lower leg: No edema.      Left lower leg: No edema.   Skin:     General: Skin is warm and dry.      Coloration: Skin is not jaundiced.      Findings: No bruising.   Neurological:      General: No focal deficit present.      Mental Status: He is alert and oriented to person, place, and time.      Cranial Nerves: No cranial nerve deficit.      Motor: No weakness.       Significant Labs: BMP:   Recent Labs   Lab 05/22/22  1747   GLU 88      K 4.6      CO2 23   BUN 35*   CREATININE 1.6*   CALCIUM 9.2    and CBC   Recent Labs   Lab 05/22/22  1747   WBC 6.34   HGB 12.6*   HCT 39.1*          Significant Imaging: Reviewed     Assessment and Plan:     Acute on chronic combined systolic and diastolic heart failure   Pt LA 1. BP now improved. Pt warm, and perfusing well. Low concern for shock at this time. Pt reports baseline BP low. Hypotension 1-2 hrs after taking Lasix, Empagliflozin and Entresto at noon      -Pt did not have Metoprolol in his medication bag, although he was unsure if he was taking it. Pt instructed to not take Metoprolol given he's on Dobutamine.  -Pt instructed to stop taking Empaglizoflozin  -Can continue Lasix 40 mg daily and Entresto 24-26 BID    -If BP remain stable, pt is eligible for discharge; Will arrange close follow up with Dr. Henry     Case discussed with Dr. Hidalgo     VTE Risk Mitigation (From admission, onward)     None        Thank you for your consult. I will sign off. Please contact us if you have any additional questions.    Curry Mendoza MD  Cardiology   [facility] - Emergency Dept

## 2022-05-28 NOTE — ED PROVIDER NOTES
Encounter Date: 5/28/2022       History     Chief Complaint   Patient presents with    Hypotension     States BP low at home     72-year-old male with a past medical history of CHF (EF 10%), AICD, gout, CAD, hypertension, HLD who is on a dobutamine pump awaiting an LVAD presents to the ED, with girlfriend at bedside, complaining of fatigue and weakness that acutely onset while patient was sitting in the car looking at houses.  Patient states that he experienced similar symptoms prior to his presentation to the emergency department 6 days ago.  Patient denies chest pain, shortness of breath, nausea, vomiting, diarrhea, abdominal pain, headache, fever and chills.  No other complaints at this time.      The history is provided by the patient and a significant other.     Review of patient's allergies indicates:   Allergen Reactions    Iodine containing multivitamin Swelling     itching    Keflex [cephalexin] Swelling     Eyes.  Tolerated multiple doses of zosyn and 1 dose of augmentin in 2015 and 2016, respectively    Peaches [peach (prunus persica)] Swelling     eyes    Shellfish containing products Swelling    Fig tree Swelling     itching    Tuberculin spenser test ppd Rash     Past Medical History:   Diagnosis Date    Acute hypoxemic respiratory failure 11/7/2015    Acute idiopathic gout of left knee 12/2/2019    Acute idiopathic gout of right elbow 9/23/2021    AICD (automatic cardioverter/defibrillator) present 12/13/2015      Anticoagulant long-term use     Bronchopneumonia 12/20/2021    CHF (congestive heart failure)     Chronic anticoagulation 5/5/2016    Chronic combined systolic and diastolic heart failure 11/26/2012    EF 10-20% on ECHO 2013    Chronic gout 12/2/2019    Clotting disorder     Coronary artery disease involving native coronary artery of native heart without angina pectoris 11/26/2012    Cath 10- Stents patent non-obstructive disease Cath 11-12015 non-obstructive  disease     COVID-19 08/2021    Had antibody infusion    Diverticulosis of colon     DVT (deep venous thrombosis), unspecified laterality 11/12/2015    Dysphonia 1/28/2018    Essential hypertension 11/15/2015    Fine motor impairment 2/2/2021    Hyperlipidemia     Hypertensive heart disease with heart failure 5/5/2016    MI (myocardial infarction) 2009    x's 5    Nicotine abuse     Obesity 11/26/2012    Olecranon bursitis of right elbow 9/19/2021    Pulmonary embolism 2011    Renal disorder     LAVERNE    Right carpal tunnel syndrome 4/6/2018    Stented coronary artery 11/26/2012    LAD stent placed 10/17/2007     Trigger thumb of right hand 4/6/2018     Past Surgical History:   Procedure Laterality Date    APPENDECTOMY      BACK SURGERY      CARDIAC DEFIBRILLATOR PLACEMENT      CARDIAC SURGERY  2007    stent    CARPAL TUNNEL RELEASE Right 04/2018    INSERTION OF BIVENTRICULAR IMPLANTABLE CARDIOVERTER-DEFIBRILLATOR (ICD) N/A 5/3/2019    Procedure: INSERTION, ICD, BIVENTRICULAR;  Surgeon: Teofilo Pal MD;  Location: [facility] EP LAB;  Service: Cardiology;  Laterality: N/A;  ICH CM,  ICD UPGD BI-V,  SJM, MAC, FAS, 3PREP (dual ICD INSITU)    r knee scope      REVISION OF SKIN POCKET FOR CARDIOVERTER-DEFIBRILLATOR Left 5/3/2019    Procedure: Revision, Skin Pocket, For Cardioverter-Defibrillator;  Surgeon: Teofilo Pal MD;  Location: [facility] EP LAB;  Service: Cardiology;  Laterality: Left;    RIGHT HEART CATHETERIZATION Right 6/14/2021    Procedure: INSERTION, CATHETER, RIGHT HEART;  Surgeon: Lizz Nieto MD;  Location: [facility] CATH LAB;  Service: Cardiology;  Laterality: Right;    SPINE SURGERY      TONSILLECTOMY      TRIGGER FINGER RELEASE Right 04/2018    thumb     Family History   Problem Relation Age of Onset    Cancer Mother         colon cancer    Heart disease Mother     Diabetes Mother     Heart disease Father     Stroke Father     Colon polyps Father     Cancer Paternal  Grandmother         leukemia    No Known Problems Sister     No Known Problems Daughter     No Known Problems Son     No Known Problems Sister     No Known Problems Son      Social History     Tobacco Use    Smoking status: Former Smoker     Packs/day: 1.00     Years: 45.00     Pack years: 45.00     Types: Cigarettes     Quit date: 2005     Years since quittin.4    Smokeless tobacco: Never Used    Tobacco comment: 1-1.5 ppd every day.   Substance Use Topics    Alcohol use: No    Drug use: No     Review of Systems   Constitutional: Positive for fatigue. Negative for activity change, chills and fever.   HENT: Negative for congestion, ear pain and sore throat.    Respiratory: Negative for shortness of breath and stridor.    Cardiovascular: Negative for chest pain and palpitations.   Gastrointestinal: Negative for abdominal pain, nausea and vomiting.   Genitourinary: Negative for dysuria.   Musculoskeletal: Negative for back pain.   Skin: Negative for rash.   Neurological: Positive for weakness. Negative for dizziness, syncope and headaches.   Hematological: Does not bruise/bleed easily.       Physical Exam     Initial Vitals   BP Pulse Resp Temp SpO2   22 1637 22 1626 22 1626 22 1701 22 1626   (!) 92/53 96 15 98.3 °F (36.8 °C) 97 %      MAP       --                Physical Exam    Nursing note and vitals reviewed.  Constitutional: Vital signs are normal. He appears well-developed and well-nourished. He is not diaphoretic. No distress.   HENT:   Head: Normocephalic and atraumatic.   Right Ear: External ear normal.   Left Ear: External ear normal.   Eyes: EOM are normal. Right eye exhibits no discharge. Left eye exhibits no discharge.   Neck: Trachea normal. Neck supple. No thyroid mass present.   Normal range of motion.  Cardiovascular: Regular rhythm, normal heart sounds and intact distal pulses. Exam reveals no gallop and no friction rub.    No murmur  heard.  Borderline tachycardic and hypotensive on exam.    Pulmonary/Chest: Breath sounds normal. No respiratory distress. He has no wheezes. He has no rhonchi. He has no rales.   No appreciable crackles on lung exam.   Abdominal: Abdomen is soft. Bowel sounds are normal. He exhibits no distension. There is no abdominal tenderness. There is no rebound and no guarding.   Musculoskeletal:         General: No edema.      Cervical back: Normal range of motion and neck supple.      Comments: No appreciable bilateral lower extremity edema.     Neurological: He is alert and oriented to person, place, and time. He has normal strength. No cranial nerve deficit or sensory deficit. GCS score is 15. GCS eye subscore is 4. GCS verbal subscore is 5. GCS motor subscore is 6.   Skin: Skin is warm and dry. Capillary refill takes less than 2 seconds. No rash noted.   PICC line in RUE for dobutamine pump present.   Psychiatric: He has a normal mood and affect.         ED Course   Procedures  Labs Reviewed   CBC W/ AUTO DIFFERENTIAL - Abnormal; Notable for the following components:       Result Value    RBC 4.32 (*)     Hemoglobin 12.0 (*)     Hematocrit 37.1 (*)     RDW 16.6 (*)     All other components within normal limits   COMPREHENSIVE METABOLIC PANEL - Abnormal; Notable for the following components:    Sodium 134 (*)     CO2 20 (*)     Glucose 124 (*)     BUN 39 (*)     Creatinine 2.1 (*)     eGFR if  35.8 (*)     eGFR if non  31.0 (*)     All other components within normal limits   CULTURE, BLOOD   CULTURE, BLOOD   LACTIC ACID, PLASMA   LACTIC ACID, PLASMA   URINALYSIS, REFLEX TO URINE CULTURE   NT-PRO NATRIURETIC PEPTIDE   SARS-COV-2 RNA AMPLIFICATION, QUAL   FERRITIN   FOLATE   IRON AND TIBC   METHYLMALONIC ACID, SERUM   T4, FREE   PROTIME-INR   MAGNESIUM   PHOSPHORUS   HEMOGLOBIN A1C          Imaging Results          X-Ray Chest AP Portable (Final result)  Result time 05/28/22 17:04:19    Final  result by Devin Strauss MD (05/28/22 17:04:19)                 Impression:      No airspace disease.    Stable examination.      Electronically signed by: Devin Strauss MD  Date:    05/28/2022  Time:    17:04             Narrative:    EXAMINATION:  XR CHEST AP PORTABLE    CLINICAL HISTORY:  Sepsis;    TECHNIQUE:  Single frontal view of the chest was performed.    COMPARISON:  05/22/2022.    FINDINGS:  The left-sided AICD right-sided PICC line remains in stable positions.  Monitoring EKG leads are present    The trachea is unremarkable.  There are calcifications of the aortic knob.  The cardiomediastinal silhouette is probable borderline enlarged.  There is no pleural effusion right.  There is a probable trace left-sided pleural effusion.  No airspace opacity is present.  The osseous structures demonstrate degenerative changes.                                 Medications   sodium chloride 0.9% bolus 250 mL (250 mLs Intravenous New Bag 5/28/22 8374)   allopurinoL tablet 200 mg (has no administration in time range)   amiodarone tablet 300 mg (has no administration in time range)   aspirin EC tablet 81 mg (has no administration in time range)   atorvastatin tablet 80 mg (has no administration in time range)   diphenhydrAMINE capsule 25 mg (has no administration in time range)   DOBUtamine 1000 mg in D5W 250 mL infusion (has no administration in time range)   simethicone chewable tablet 80 mg (has no administration in time range)   sodium chloride 0.9% flush 10 mL (has no administration in time range)   heparin (porcine) injection 5,000 Units (has no administration in time range)   acetaminophen tablet 650 mg (has no administration in time range)   HYDROcodone-acetaminophen 5-325 mg per tablet 1 tablet (has no administration in time range)   HYDROcodone-acetaminophen  mg per tablet 1 tablet (has no administration in time range)   senna-docusate 8.6-50 mg per tablet 1 tablet (has no administration in time range)    melatonin tablet 6 mg (has no administration in time range)   ondansetron injection 4 mg (has no administration in time range)     Medical Decision Making:   Initial Assessment:   72-year-old male who appears to be uncomfortable due to symptoms presents the ED complaining of weakness and fatigue.  Patient is able to converse normally, breath sounds are equal bilaterally and distal pulses are thready but present.  Physical exam is grossly unremarkable.  Differential Diagnosis:   Cardiogenic shock  Hypokalemia  Neurogenic shock  Sepsis  ED Management:  CBC shows a mild anemia which is consistent with historical data.  CMP shows an elevated BUN and creatinine which is worsening when compared to his previous CMP.  Chest x-ray is grossly normal.  Cardiology came down to see the patient and after their evaluation they would like an additional 250 mL bolus due to his positive orthostatic findings.  Patient will be admitted to Dr. Henry for hypotension. Patient understands and agrees with the plan.                       Clinical Impression:   Final diagnoses:  [I95.9] Hypotension (Primary)  [R79.89] Elevated serum creatinine  [R79.9] Elevated BUN          ED Disposition Condition    Admit               Diann Baez MD  Resident  05/28/22 [id number]       Diann Baez MD  Resident  05/28/22 [id number]

## 2022-05-28 NOTE — ED NOTES
LOC: The patient is awake, alert and aware of environment with an appropriate affect, the patient is oriented x 3 and speaking appropriately.  APPEARANCE: Patient resting comfortably and in no acute distress, patient is clean and well groomed, patient's clothing is properly fastened.  SKIN: The skin is warm and dry, color consistent with ethnicity, patient has normal skin turgor and moist mucus membranes, skin intact, no breakdown or bruising noted.  MUSCULOSKELETAL: Patient moving all extremities spontaneously, no obvious swelling or deformities noted.  RESPIRATORY: Airway is open and patent, respirations are spontaneous, patient has a normal effort and rate, no accessory muscle use noted.  CARDIAC: Patient has a normal rate and regular rhythm, no periphreal edema noted, capillary refill < 3 seconds.  ABDOMEN: Soft and non tender to palpation, + distention noted, normoactive bowel sounds present in all four quadrants.  NEUROLOGIC:  facial expression is symmetrical, patient moving all extremities spontaneously, normal sensation in all extremities when touched with a finger.  Follows all commands appropriately.

## 2022-05-28 NOTE — H&P
[facility] - Emergency Dept  Heart Transplant Cardiology  History and Physical     Patient Name: Audrey Oneil Jr.  MRN: [id number]  Admission Date: 5/28/2022  Code Status: Full Code   Attending Provider:   Lupe Arrieta MD   Primary Care Physician: January Khan MD  Principal Problem:<principal problem not specified>    Patient information was obtained from patient and ER records.     Subjective:     Chief Complaint:  My blood pressure is low.     HPI:  Audrey Oneil is a 72 y/o M with ICM (EF 15%) on home  2.5 (previously refused LVAD, but now amenable), s/p St-Rodri CRT-D 5/2019, history of VT, HTN, HLD, CAD s/p MI who presents for lightheadedness and low blood pressure. Pt seen several times in the ED in the last month for pre-syncopal sx and low blood pressure.      Pt last seen ED 6 days ago with similar issues with Jardiance was discontinued. He was last seen by Dr. Henry and instructed to decrease his diuretic once daily due to issues with hypotension and prerenal LAVERNE. Since then pt has been compliant with his medications. Pt reports he took his Entresto 24-26 (BID), Lasix 40mg and aldatone 25mg today around noon. About 2 hrs later pt felt lightheaded and chekced his BP which he noted to be low so he presented to the ED      BP in the 70s on arrival and improved to 110s with 250cc IVF. Cr was 1.6--> 2.1 (Baseline 1.5-1.9), LFTs wnl LA 1.8 with positive orthostatics. Pt saturating well on RA, HR 70s-80s CXR unremarkable and overall appears dry on exam.  His dry weight between 206-211 lbs and he was 200 lbs in ED.     5/9/22 TTE   · The left ventricle is severely enlarged with eccentric hypertrophy and severely decreased systolic function. The estimated ejection fraction is 15%.  · There is left ventricular global hypokinesis.  · Grade I left ventricular diastolic dysfunction.  · Normal right ventricular size with normal right ventricular systolic function.  · Mild left atrial enlargement.  · Mild  tricuspid regurgitation.  · The estimated PA systolic pressure is 25 mmHg.  · Normal central venous pressure (3 mmHg).      Past Medical History:   Diagnosis Date    Acute hypoxemic respiratory failure 11/7/2015    Acute idiopathic gout of left knee 12/2/2019    Acute idiopathic gout of right elbow 9/23/2021    AICD (automatic cardioverter/defibrillator) present 12/13/2015      Anticoagulant long-term use     Bronchopneumonia 12/20/2021    CHF (congestive heart failure)     Chronic anticoagulation 5/5/2016    Chronic combined systolic and diastolic heart failure 11/26/2012    EF 10-20% on ECHO 2013    Chronic gout 12/2/2019    Clotting disorder     Coronary artery disease involving native coronary artery of native heart without angina pectoris 11/26/2012    Cath 10- Stents patent non-obstructive disease Cath 11-12015 non-obstructive disease     COVID-19 08/2021    Had antibody infusion    Diverticulosis of colon     DVT (deep venous thrombosis), unspecified laterality 11/12/2015    Dysphonia 1/28/2018    Essential hypertension 11/15/2015    Fine motor impairment 2/2/2021    Hyperlipidemia     Hypertensive heart disease with heart failure 5/5/2016    MI (myocardial infarction) 2009    x's 5    Nicotine abuse     Obesity 11/26/2012    Olecranon bursitis of right elbow 9/19/2021    Pulmonary embolism 2011    Renal disorder     LAVERNE    Right carpal tunnel syndrome 4/6/2018    Stented coronary artery 11/26/2012    LAD stent placed 10/17/2007     Trigger thumb of right hand 4/6/2018       Past Surgical History:   Procedure Laterality Date    APPENDECTOMY      BACK SURGERY      CARDIAC DEFIBRILLATOR PLACEMENT      CARDIAC SURGERY  2007    stent    CARPAL TUNNEL RELEASE Right 04/2018    INSERTION OF BIVENTRICULAR IMPLANTABLE CARDIOVERTER-DEFIBRILLATOR (ICD) N/A 5/3/2019    Procedure: INSERTION, ICD, BIVENTRICULAR;  Surgeon: Teoiflo Pal MD;  Location: [facility];   Service: Cardiology;  Laterality: N/A;  ICH CM,  ICD UPGD BI-V,  SJM, MAC, FAS, 3PREP (dual ICD INSITU)    r knee scope      REVISION OF SKIN POCKET FOR CARDIOVERTER-DEFIBRILLATOR Left 5/3/2019    Procedure: Revision, Skin Pocket, For Cardioverter-Defibrillator;  Surgeon: Teofilo Pal MD;  Location: [facility] EP LAB;  Service: Cardiology;  Laterality: Left;    RIGHT HEART CATHETERIZATION Right 6/14/2021    Procedure: INSERTION, CATHETER, RIGHT HEART;  Surgeon: Lizz Nieto MD;  Location: [facility] CATH LAB;  Service: Cardiology;  Laterality: Right;    SPINE SURGERY      TONSILLECTOMY      TRIGGER FINGER RELEASE Right 04/2018    thumb       Review of patient's allergies indicates:   Allergen Reactions    Iodine containing multivitamin Swelling     itching    Keflex [cephalexin] Swelling     Eyes.  Tolerated multiple doses of zosyn and 1 dose of augmentin in 2015 and 2016, respectively    Peaches [peach (prunus persica)] Swelling     eyes    Shellfish containing products Swelling    Fig tree Swelling     itching    Tuberculin spenser test ppd Rash       No current facility-administered medications on file prior to encounter.     Current Outpatient Medications on File Prior to Encounter   Medication Sig    allopurinoL (ZYLOPRIM) 100 MG tablet Take 2 tablets (200 mg total) by mouth once daily.    amiodarone (PACERONE) 200 MG Tab TAKE 1 AND 1/2 TABLETS(300 MG) BY MOUTH EVERY DAY    aspirin (ECOTRIN) 81 MG EC tablet Take 1 tablet (81 mg total) by mouth once daily.    atorvastatin (LIPITOR) 80 MG tablet Take 1 tablet (80 mg total) by mouth once daily.    diphenhydrAMINE (BENADRYL) 25 mg capsule Take 25 mg by mouth as needed for Allergies.    DOBUTamine (DOBUTREX) 500 mg/250 mL (2,000 mcg/mL) 2.5 mcg/kg/min  Patient is 95.7 kg    furosemide (LASIX) 40 MG tablet Take 1 tablet (40 mg total) by mouth once daily. Take afternoon dose if needed    ondansetron (ZOFRAN-ODT) 4 MG TbDL Dissolve 1 tablet (4 mg total)  by mouth every 6 (six) hours as needed (nausea).    OPW TEST CLAIM - DO NOT FILL OPW test claim. Do not fill.    oxyCODONE-acetaminophen (PERCOCET) 5-325 mg per tablet Take 1 tablet by mouth every 6 (six) hours as needed for Pain.    sacubitriL-valsartan (ENTRESTO) 24-26 mg per tablet Take 1 tablet by mouth 2 (two) times daily.    simethicone (MYLICON) 80 MG chewable tablet Take 1 tablet (80 mg total) by mouth every 6 (six) hours as needed for Flatulence.    spironolactone (ALDACTONE) 25 MG tablet Take 1 tablet (25 mg total) by mouth once daily.    [DISCONTINUED] empagliflozin (JARDIANCE) 25 mg tablet Take 1 tablet (25 mg total) by mouth once daily.    [DISCONTINUED] clopidogreL (PLAVIX) 75 mg tablet Take 1 tablet (75 mg total) by mouth once daily. (Patient not taking: Reported on 3/18/2022)    [DISCONTINUED] colchicine (COLCRYS) 0.6 mg tablet Take 1 tablet (0.6 mg total) by mouth once daily. (Patient not taking: Reported on 3/18/2022)    [DISCONTINUED] ipratropium (ATROVENT) 0.02 % nebulizer solution Take 2.5 mLs (500 mcg total) by nebulization 4 (four) times daily as needed for Wheezing. Rescue (Patient not taking: Reported on 3/29/2022)    [DISCONTINUED] metoprolol succinate (TOPROL-XL) 25 MG 24 hr tablet Take 1 tablet (25 mg total) by mouth once daily.    [DISCONTINUED] midodrine (PROAMATINE) 2.5 MG Tab Take 1 tablet (2.5 mg total) by mouth 3 (three) times daily. HOLD until follow up with cardiology     Family History       Problem Relation (Age of Onset)    Cancer Mother, Paternal Grandmother    Colon polyps Father    Diabetes Mother    Heart disease Mother, Father    No Known Problems Sister, Daughter, Son, Sister, Son    Stroke Father          Tobacco Use    Smoking status: Former Smoker     Packs/day: 1.00     Years: 45.00     Pack years: 45.00     Types: Cigarettes     Quit date: 2005     Years since quittin.4    Smokeless tobacco: Never Used    Tobacco comment: 1-1.5 ppd every day.    Substance and Sexual Activity    Alcohol use: No    Drug use: No    Sexual activity: Never     ROS denies dyspnea, orthopnea, PND or weight gain.   Objective:     Vital Signs (Most Recent):  Temp: 98.3 °F (36.8 °C) (05/28/22 1701)  Pulse: 85 (05/28/22 1742)  Resp: 16 (05/28/22 1706)  BP: (!) 107/54 (05/28/22 1742)  SpO2: 95 % (05/28/22 1717)   Vital Signs (24h Range):  Temp:  [98.3 °F (36.8 °C)] 98.3 °F (36.8 °C)  Pulse:  [83-96] 85  Resp:  [15-16] 16  SpO2:  [95 %-97 %] 95 %  BP: ()/(50-54) 107/54     Weight: 90.7 kg (200 lb)  Body mass index is 32.28 kg/m².    SpO2: 95 %  O2 Device (Oxygen Therapy): room air    No intake or output data in the 24 hours ending 05/28/22 1753    Lines/Drains/Airways       Peripherally Inserted Central Catheter Line  Duration             PICC Double Lumen right basilic -- days              Peripheral Intravenous Line  Duration                  Peripheral IV - Single Lumen 05/28/22 1639 20 G Left Hand <1 day                    Physical Exam  Constitutional: No distress, obese, conversant  HEENT: Sclera anicteric, PERRLA, EOMI  Neck: No JVD, no masses, good movement  CV: RRR, S1 and S2 normal, no additional heart sounds or murmurs. Pulses 2+ and equal bilaterally in radial arteries, Nikolas's normal on right. Distal pulses are 2+ and equal in the femoral, DP and PT areas bilaterally  Pulm: Clear to auscultation bilaterally with symmetrical expansion. Chest wall palpated for reproduction of pain symptoms, and no pain was able to be produced on palpation or resistance exercises  GI: Abdomen soft, non-tender, good bowel sounds  Extremities: Both extremities intact and grossly normal, skin is warm, no edema noted    Significant Labs: Blood Culture: No results for input(s): LABBLOO in the last 48 hours., BMP:   Recent Labs   Lab 05/28/22  1645   *   *   K 4.1      CO2 20*   BUN 39*   CREATININE 2.1*   CALCIUM 9.1   , CMP   Recent Labs   Lab 05/28/22  1645   *    K 4.1      CO2 20*   *   BUN 39*   CREATININE 2.1*   CALCIUM 9.1   PROT 6.5   ALBUMIN 3.6   BILITOT 0.5   ALKPHOS 72   AST 18   ALT 17   ANIONGAP 12   ESTGFRAFRICA 35.8*   EGFRNONAA 31.0*   , CBC   Recent Labs   Lab 05/28/22  1645   WBC 6.75   HGB 12.0*   HCT 37.1*      , INR No results for input(s): INR, PROTIME in the last 48 hours., and Lipid Panel No results for input(s): CHOL, HDL, LDLCALC, TRIG, CHOLHDL in the last 48 hours.    Significant Imaging: CT scan: CT ABDOMEN PELVIS WITH CONTRAST: No results found for this visit on 05/28/22. and CT ABDOMEN PELVIS WITHOUT CONTRAST: No results found for this visit on 05/28/22. and Echocardiogram: 2D echo with color flow doppler:   Results for orders placed or performed during the hospital encounter of 04/27/18   2D echo with color flow doppler   Result Value Ref Range    EF + QEF 20 (A) 55 - 65    Mitral Valve Regurgitation TRIVIAL     Diastolic Dysfunction Yes (A)     Est. PA Systolic Pressure 25.28     Tricuspid Valve Regurgitation TRIVIAL TO MILD     Narrative    Date of Procedure: 04/27/2018        TEST DESCRIPTION   Technical Quality: This study was performed in conjunction with a 3ml intravenous injection of Optison contrast agent.     General: A catheter is present in the right-sided cardiac chambers.     Aorta: The aortic root is normal in size, measuring 3.2 cm at sinotubular junction and 3.5 cm at Sinuses of Valsalva. The proximal ascending aorta is normal in size, measuring 3.4 cm across.     Left Atrium: The left atrial volume index is mildly enlarged, measuring 36.22 cc/m2.     Left Ventricle: The left ventricle is upper limit of normal, with an end-diastolic diameter of 5.4 cm, and an end-systolic diameter of 4.8 cm. LV wall thickness is normal, with the septum measuring 0.8 cm and the posterior wall measuring 0.9 cm across.   Relative wall thickness was normal at 0.33, and the LV mass index was 89.8 g/m2 consistent with normal left  ventricular mass. The apex is severely hypokinetic.   The following segments were dyskinetic: apical inferior wall.  The following segments were akinetic: mid anteroseptum, mid inferoseptum, basal inferior wall, apical anterior wall.  The following segments were severely hypokinetic: apical septum, apical lateral wall, mid anterolateral wall, mid inferior wall, mid anterior wall.  There is global hypokinesis. Left ventricular systolic function appears severely depressed. Visually estimated ejection fraction is upper teens to low 20s. The LV Doppler derived stroke volume equals 73.0 ccs.     Diastolic indices: E wave velocity 0.5 m/s, E/A ratio 0.6,  msec., E/e' ratio(avg) 9. Pulmonary vein systolic/diastolic ratio is normal. Mean left atrial pressures are normal. There is diastolic dysfunction secondary to relaxation abnormality.     Right Atrium: The right atrium is normal in size, measuring 4.7 cm in length and 4.0 cm in width in the apical view.     Right Ventricle: The right ventricle is normal in size measuring 3.6 cm at the base in the apical right ventricle-focused view. Global right ventricular systolic function appears normal. There is abnormal septal motion. Tricuspid annular plane systolic   excursion (TAPSE) is 2.3 cm. Tissue Doppler-derived tricuspid annular peak systolic velocity (S prime) is 11.1 cm/s. The estimated PA systolic pressure is 25 mmHg.     Aortic Valve:  The peak velocity obtained across the aortic valve is 1.6 m/s, which translates to a peak gradient of 10 mmHg. The mean gradient is 4 mmHg. Using a left ventricular outflow tract diameter of 2.4 cm, a left ventricular outflow tract   velocity time integral of 17 cm, and a peak instantaneous transvalvular velocity time integral of 27 cm, the calculated aortic valve area is 2.75 cm2(AVAi is 1.27 cm2/m2). There is aortic annular calcification. Aortic valve is normal in structure with   normal leaflet mobility.     Mitral Valve:  There  is trivial mitral regurgitation. Mitral valve is normal in structure with normal leaflet mobility.     Tricuspid Valve:  There is trivial to mild tricuspid regurgitation. Tricuspid valve is normal in structure with normal leaflet mobility.     Pulmonary Valve:  Pulmonary valve is normal in structure with normal leaflet mobility.     IVC: IVC is normal in size and collapses > 50% with a sniff, suggesting normal right atrial pressure of 3 mmHg.     Intracavitary: There is no evidence of pericardial effusion, intracavity mass, thrombi, or vegetation.     The Blue Index was 53.1 msec, suggesting severe left ventricular dyssynchrony.    CONCLUSIONS     1 - Upper limit of normal left ventricular enlargement.     2 - Severely depressed left ventricular systolic function (EF upper teens to low 20s).     3 - Mild left atrial enlargement.     4 - Impaired LV relaxation, normal LAP (grade 1 diastolic dysfunction).     5 - Normal right ventricular systolic function .     6 - Trivial mitral regurgitation.     7 - Trivial to mild tricuspid regurgitation.     8 - The estimated PA systolic pressure is 25 mmHg.     9 - TDI as per text.             This document has been electronically    SIGNED BY: Edilberto Dixon MD On: 04/30/2018 10:50    This document was originally electronically signed on: 04/27/2018 17:03    and Transthoracic echo (TTE) complete (Cupid Only):   Results for orders placed or performed during the hospital encounter of 05/09/22   Echo   Result Value Ref Range    BSA 2.1 m2    TDI SEPTAL 0.08 m/s    LV LATERAL E/E' RATIO 3.55 m/s    LV SEPTAL E/E' RATIO 4.88 m/s    LA WIDTH 4.76 cm    TDI LATERAL 0.11 m/s    LVIDd 7.11 (A) 3.5 - 6.0 cm    IVS 0.57 (A) 0.6 - 1.1 cm    Posterior Wall 1.13 (A) 0.6 - 1.1 cm    LVIDs 6.08 (A) 2.1 - 4.0 cm    FS 14 28 - 44 %    LA volume 75.65 cm3    Sinus 3.91 cm    STJ 3.34 cm    Ascending aorta 3.83 cm    LV mass 270.48 g    LA size 3.57 cm    RVDD 3.02 cm    RV S' 6.65 cm/s    Left  "Ventricle Relative Wall Thickness 0.32 cm    AV mean gradient 3 mmHg    AV valve area 3.58 cm2    AV Velocity Ratio 0.71     AV index (prosthetic) 0.80     MV valve area p 1/2 method 6.74 cm2    E/A ratio 0.47     Mean e' 0.10 m/s    E wave deceleration time 112.61 msec    IVRT 117.03 msec    MV "A" wave duration 17.98 msec    Pulm vein S/D ratio 1.05     LVOT diameter 2.38 cm    LVOT area 4.4 cm2    LVOT peak russel 0.95 m/s    LVOT peak VTI 16.00 cm    Ao peak russel 1.33 m/s    Ao VTI 19.88 cm    LVOT stroke volume 71.14 cm3    AV peak gradient 7 mmHg    E/E' ratio 4.11 m/s    MV Peak E Russel 0.39 m/s    TR Max Russel 2.36 m/s    MV stenosis pressure 1/2 time 32.66 ms    MV Peak A Russel 0.83 m/s    PV Peak S Russel 0.43 m/s    PV Peak D Russel 0.41 m/s    LV Systolic Volume 185.44 mL    LV Systolic Volume Index 90.9 mL/m2    LV Diastolic Volume 264.53 mL    LV Diastolic Volume Index 129.67 mL/m2    LA Volume Index 37.1 mL/m2    LV Mass Index 133 g/m2    RA Major Axis 3.72 cm    Left Atrium Minor Axis 5.13 cm    Left Atrium Major Axis 5.35 cm    Triscuspid Valve Regurgitation Peak Gradient 22 mmHg    LA Volume Index (Mod) 42.9 mL/m2    LA volume (mod) 87.54 cm3    RA Width 3.13 cm    Right Atrial Pressure (from IVC) 3 mmHg    EF 15 %    TV rest pulmonary artery pressure 25 mmHg    Narrative    · The left ventricle is severely enlarged with eccentric hypertrophy and   severely decreased systolic function. The estimated ejection fraction is   15%.  · There is left ventricular global hypokinesis.  · Grade I left ventricular diastolic dysfunction.  · Normal right ventricular size with normal right ventricular systolic   function.  · Mild left atrial enlargement.  · Mild tricuspid regurgitation.  · The estimated PA systolic pressure is 25 mmHg.  · Normal central venous pressure (3 mmHg).        Assessment and Plan:     H/O ventricular tachycardia  -continue home amiodarone of 300 mg daily    Ventricular tachycardia  -recommend " interrogation of device    Postural dizziness with presyncope  2/2 iatrogenic hypotension  -hold home entresto 24-26, spirinolactone 25mg daily and lasix 40mg daily  -s/p 250 cc IVF  -please give additional 250 cc IVF then repeat orthostatics   -continue home dobutamine 2.5 mg for now  -please draw sv02 and CVP from PICC  -limited TTE    Coronary artery disease involving native coronary artery of native heart without angina pectoris  -continue asa and statin        VTE Risk Mitigation (From admission, onward)         Ordered     heparin (porcine) injection 5,000 Units  Every 8 hours         05/28/22 1749     IP VTE HIGH RISK PATIENT  Once         05/28/22 1749                Abeba Landon MD  Cardiology   [facility] - Emergency Dept

## 2022-05-28 NOTE — Clinical Note
Is this patient a high probability for COVID-19?: No   Diagnosis: Hypotension [040017]   Admitting Provider:: DIANNA BRIONES JR [[id number]]   Future Attending Provider: DIANNA BRIONES JR [[id number]]   Reason for IP Medical Treatment  (Clinical interventions that can only be accomplished in the IP setting? ) :: hypotension   Estimated Length of Stay:: 2 midnights   I certify that Inpatient services for greater than or equal to 2 midnights are medically necessary:: Yes   Plans for Post-Acute care--if anticipated (pick the single best option):: C. Discharge home with home health services Comment: dobutamine

## 2022-05-28 NOTE — SUBJECTIVE & OBJECTIVE
Past Medical History:   Diagnosis Date    Acute hypoxemic respiratory failure 11/7/2015    Acute idiopathic gout of left knee 12/2/2019    Acute idiopathic gout of right elbow 9/23/2021    AICD (automatic cardioverter/defibrillator) present 12/13/2015      Anticoagulant long-term use     Bronchopneumonia 12/20/2021    CHF (congestive heart failure)     Chronic anticoagulation 5/5/2016    Chronic combined systolic and diastolic heart failure 11/26/2012    EF 10-20% on ECHO 2013    Chronic gout 12/2/2019    Clotting disorder     Coronary artery disease involving native coronary artery of native heart without angina pectoris 11/26/2012    Cath 10- Stents patent non-obstructive disease Cath 11-12015 non-obstructive disease     COVID-19 08/2021    Had antibody infusion    Diverticulosis of colon     DVT (deep venous thrombosis), unspecified laterality 11/12/2015    Dysphonia 1/28/2018    Essential hypertension 11/15/2015    Fine motor impairment 2/2/2021    Hyperlipidemia     Hypertensive heart disease with heart failure 5/5/2016    MI (myocardial infarction) 2009    x's 5    Nicotine abuse     Obesity 11/26/2012    Olecranon bursitis of right elbow 9/19/2021    Pulmonary embolism 2011    Renal disorder     LAVERNE    Right carpal tunnel syndrome 4/6/2018    Stented coronary artery 11/26/2012    LAD stent placed 10/17/2007     Trigger thumb of right hand 4/6/2018       Past Surgical History:   Procedure Laterality Date    APPENDECTOMY      BACK SURGERY      CARDIAC DEFIBRILLATOR PLACEMENT      CARDIAC SURGERY  2007    stent    CARPAL TUNNEL RELEASE Right 04/2018    INSERTION OF BIVENTRICULAR IMPLANTABLE CARDIOVERTER-DEFIBRILLATOR (ICD) N/A 5/3/2019    Procedure: INSERTION, ICD, BIVENTRICULAR;  Surgeon: Teofilo Pal MD;  Location: [facility] LAB;  Service: Cardiology;  Laterality: N/A;  ICH CM,  ICD UPGD BI-V,  SJM, MAC, FAS, 3PREP (dual ICD INSITU)    r knee scope      REVISION OF SKIN POCKET FOR  CARDIOVERTER-DEFIBRILLATOR Left 5/3/2019    Procedure: Revision, Skin Pocket, For Cardioverter-Defibrillator;  Surgeon: Teofilo Pal MD;  Location: [facility] EP LAB;  Service: Cardiology;  Laterality: Left;    RIGHT HEART CATHETERIZATION Right 6/14/2021    Procedure: INSERTION, CATHETER, RIGHT HEART;  Surgeon: Lizz Nieto MD;  Location: [facility] CATH LAB;  Service: Cardiology;  Laterality: Right;    SPINE SURGERY      TONSILLECTOMY      TRIGGER FINGER RELEASE Right 04/2018    thumb       Review of patient's allergies indicates:   Allergen Reactions    Iodine containing multivitamin Swelling     itching    Keflex [cephalexin] Swelling     Eyes.  Tolerated multiple doses of zosyn and 1 dose of augmentin in 2015 and 2016, respectively    Peaches [peach (prunus persica)] Swelling     eyes    Shellfish containing products Swelling    Fig tree Swelling     itching    Tuberculin spenser test ppd Rash       No current facility-administered medications on file prior to encounter.     Current Outpatient Medications on File Prior to Encounter   Medication Sig    allopurinoL (ZYLOPRIM) 100 MG tablet Take 2 tablets (200 mg total) by mouth once daily.    amiodarone (PACERONE) 200 MG Tab TAKE 1 AND 1/2 TABLETS(300 MG) BY MOUTH EVERY DAY    aspirin (ECOTRIN) 81 MG EC tablet Take 1 tablet (81 mg total) by mouth once daily.    atorvastatin (LIPITOR) 80 MG tablet Take 1 tablet (80 mg total) by mouth once daily.    diphenhydrAMINE (BENADRYL) 25 mg capsule Take 25 mg by mouth as needed for Allergies.    DOBUTamine (DOBUTREX) 500 mg/250 mL (2,000 mcg/mL) 2.5 mcg/kg/min  Patient is 95.7 kg    furosemide (LASIX) 40 MG tablet Take 1 tablet (40 mg total) by mouth once daily. Take afternoon dose if needed    ondansetron (ZOFRAN-ODT) 4 MG TbDL Dissolve 1 tablet (4 mg total) by mouth every 6 (six) hours as needed (nausea).    OPW TEST CLAIM - DO NOT FILL OPW test claim. Do not fill.    oxyCODONE-acetaminophen (PERCOCET) 5-325 mg per tablet Take  1 tablet by mouth every 6 (six) hours as needed for Pain.    sacubitriL-valsartan (ENTRESTO) 24-26 mg per tablet Take 1 tablet by mouth 2 (two) times daily.    simethicone (MYLICON) 80 MG chewable tablet Take 1 tablet (80 mg total) by mouth every 6 (six) hours as needed for Flatulence.    spironolactone (ALDACTONE) 25 MG tablet Take 1 tablet (25 mg total) by mouth once daily.    [DISCONTINUED] empagliflozin (JARDIANCE) 25 mg tablet Take 1 tablet (25 mg total) by mouth once daily.    [DISCONTINUED] clopidogreL (PLAVIX) 75 mg tablet Take 1 tablet (75 mg total) by mouth once daily. (Patient not taking: Reported on 3/18/2022)    [DISCONTINUED] colchicine (COLCRYS) 0.6 mg tablet Take 1 tablet (0.6 mg total) by mouth once daily. (Patient not taking: Reported on 3/18/2022)    [DISCONTINUED] ipratropium (ATROVENT) 0.02 % nebulizer solution Take 2.5 mLs (500 mcg total) by nebulization 4 (four) times daily as needed for Wheezing. Rescue (Patient not taking: Reported on 3/29/2022)    [DISCONTINUED] metoprolol succinate (TOPROL-XL) 25 MG 24 hr tablet Take 1 tablet (25 mg total) by mouth once daily.    [DISCONTINUED] midodrine (PROAMATINE) 2.5 MG Tab Take 1 tablet (2.5 mg total) by mouth 3 (three) times daily. HOLD until follow up with cardiology     Family History       Problem Relation (Age of Onset)    Cancer Mother, Paternal Grandmother    Colon polyps Father    Diabetes Mother    Heart disease Mother, Father    No Known Problems Sister, Daughter, Son, Sister, Son    Stroke Father          Tobacco Use    Smoking status: Former Smoker     Packs/day: 1.00     Years: 45.00     Pack years: 45.00     Types: Cigarettes     Quit date: 2005     Years since quittin.4    Smokeless tobacco: Never Used    Tobacco comment: 1-1.5 ppd every day.   Substance and Sexual Activity    Alcohol use: No    Drug use: No    Sexual activity: Never     ROS denies dyspnea, orthopnea, PND or weight gain.   Objective:     Vital Signs (Most  Recent):  Temp: 98.3 °F (36.8 °C) (05/28/22 1701)  Pulse: 85 (05/28/22 1742)  Resp: 16 (05/28/22 1706)  BP: (!) 107/54 (05/28/22 1742)  SpO2: 95 % (05/28/22 1717)   Vital Signs (24h Range):  Temp:  [98.3 °F (36.8 °C)] 98.3 °F (36.8 °C)  Pulse:  [83-96] 85  Resp:  [15-16] 16  SpO2:  [95 %-97 %] 95 %  BP: ()/(50-54) 107/54     Weight: 90.7 kg (200 lb)  Body mass index is 32.28 kg/m².    SpO2: 95 %  O2 Device (Oxygen Therapy): room air    No intake or output data in the 24 hours ending 05/28/22 1753    Lines/Drains/Airways       Peripherally Inserted Central Catheter Line  Duration             PICC Double Lumen right basilic -- days              Peripheral Intravenous Line  Duration                  Peripheral IV - Single Lumen 05/28/22 1639 20 G Left Hand <1 day                    Physical Exam  Constitutional: No distress, obese, conversant  HEENT: Sclera anicteric, PERRLA, EOMI  Neck: No JVD, no masses, good movement  CV: RRR, S1 and S2 normal, no additional heart sounds or murmurs. Pulses 2+ and equal bilaterally in radial arteries, Nikolas's normal on right. Distal pulses are 2+ and equal in the femoral, DP and PT areas bilaterally  Pulm: Clear to auscultation bilaterally with symmetrical expansion. Chest wall palpated for reproduction of pain symptoms, and no pain was able to be produced on palpation or resistance exercises  GI: Abdomen soft, non-tender, good bowel sounds  Extremities: Both extremities intact and grossly normal, skin is warm, no edema noted    Significant Labs: Blood Culture: No results for input(s): LABBLOO in the last 48 hours., BMP:   Recent Labs   Lab 05/28/22  1645   *   *   K 4.1      CO2 20*   BUN 39*   CREATININE 2.1*   CALCIUM 9.1   , CMP   Recent Labs   Lab 05/28/22  1645   *   K 4.1      CO2 20*   *   BUN 39*   CREATININE 2.1*   CALCIUM 9.1   PROT 6.5   ALBUMIN 3.6   BILITOT 0.5   ALKPHOS 72   AST 18   ALT 17   ANIONGAP 12   ESTGFRAFRICA 35.8*    EGFRNONAA 31.0*   , CBC   Recent Labs   Lab 05/28/22  1645   WBC 6.75   HGB 12.0*   HCT 37.1*      , INR No results for input(s): INR, PROTIME in the last 48 hours., and Lipid Panel No results for input(s): CHOL, HDL, LDLCALC, TRIG, CHOLHDL in the last 48 hours.    Significant Imaging: CT scan: CT ABDOMEN PELVIS WITH CONTRAST: No results found for this visit on 05/28/22. and CT ABDOMEN PELVIS WITHOUT CONTRAST: No results found for this visit on 05/28/22. and Echocardiogram: 2D echo with color flow doppler:   Results for orders placed or performed during the hospital encounter of 04/27/18   2D echo with color flow doppler   Result Value Ref Range    EF + QEF 20 (A) 55 - 65    Mitral Valve Regurgitation TRIVIAL     Diastolic Dysfunction Yes (A)     Est. PA Systolic Pressure 25.28     Tricuspid Valve Regurgitation TRIVIAL TO MILD     Narrative    Date of Procedure: 04/27/2018        TEST DESCRIPTION   Technical Quality: This study was performed in conjunction with a 3ml intravenous injection of Optison contrast agent.     General: A catheter is present in the right-sided cardiac chambers.     Aorta: The aortic root is normal in size, measuring 3.2 cm at sinotubular junction and 3.5 cm at Sinuses of Valsalva. The proximal ascending aorta is normal in size, measuring 3.4 cm across.     Left Atrium: The left atrial volume index is mildly enlarged, measuring 36.22 cc/m2.     Left Ventricle: The left ventricle is upper limit of normal, with an end-diastolic diameter of 5.4 cm, and an end-systolic diameter of 4.8 cm. LV wall thickness is normal, with the septum measuring 0.8 cm and the posterior wall measuring 0.9 cm across.   Relative wall thickness was normal at 0.33, and the LV mass index was 89.8 g/m2 consistent with normal left ventricular mass. The apex is severely hypokinetic.   The following segments were dyskinetic: apical inferior wall.  The following segments were akinetic: mid anteroseptum, mid  inferoseptum, basal inferior wall, apical anterior wall.  The following segments were severely hypokinetic: apical septum, apical lateral wall, mid anterolateral wall, mid inferior wall, mid anterior wall.  There is global hypokinesis. Left ventricular systolic function appears severely depressed. Visually estimated ejection fraction is upper teens to low 20s. The LV Doppler derived stroke volume equals 73.0 ccs.     Diastolic indices: E wave velocity 0.5 m/s, E/A ratio 0.6,  msec., E/e' ratio(avg) 9. Pulmonary vein systolic/diastolic ratio is normal. Mean left atrial pressures are normal. There is diastolic dysfunction secondary to relaxation abnormality.     Right Atrium: The right atrium is normal in size, measuring 4.7 cm in length and 4.0 cm in width in the apical view.     Right Ventricle: The right ventricle is normal in size measuring 3.6 cm at the base in the apical right ventricle-focused view. Global right ventricular systolic function appears normal. There is abnormal septal motion. Tricuspid annular plane systolic   excursion (TAPSE) is 2.3 cm. Tissue Doppler-derived tricuspid annular peak systolic velocity (S prime) is 11.1 cm/s. The estimated PA systolic pressure is 25 mmHg.     Aortic Valve:  The peak velocity obtained across the aortic valve is 1.6 m/s, which translates to a peak gradient of 10 mmHg. The mean gradient is 4 mmHg. Using a left ventricular outflow tract diameter of 2.4 cm, a left ventricular outflow tract   velocity time integral of 17 cm, and a peak instantaneous transvalvular velocity time integral of 27 cm, the calculated aortic valve area is 2.75 cm2(AVAi is 1.27 cm2/m2). There is aortic annular calcification. Aortic valve is normal in structure with   normal leaflet mobility.     Mitral Valve:  There is trivial mitral regurgitation. Mitral valve is normal in structure with normal leaflet mobility.     Tricuspid Valve:  There is trivial to mild tricuspid regurgitation.  Tricuspid valve is normal in structure with normal leaflet mobility.     Pulmonary Valve:  Pulmonary valve is normal in structure with normal leaflet mobility.     IVC: IVC is normal in size and collapses > 50% with a sniff, suggesting normal right atrial pressure of 3 mmHg.     Intracavitary: There is no evidence of pericardial effusion, intracavity mass, thrombi, or vegetation.     The Blue Index was 53.1 msec, suggesting severe left ventricular dyssynchrony.    CONCLUSIONS     1 - Upper limit of normal left ventricular enlargement.     2 - Severely depressed left ventricular systolic function (EF upper teens to low 20s).     3 - Mild left atrial enlargement.     4 - Impaired LV relaxation, normal LAP (grade 1 diastolic dysfunction).     5 - Normal right ventricular systolic function .     6 - Trivial mitral regurgitation.     7 - Trivial to mild tricuspid regurgitation.     8 - The estimated PA systolic pressure is 25 mmHg.     9 - TDI as per text.             This document has been electronically    SIGNED BY: Edilberto Dixon MD On: 04/30/2018 10:50    This document was originally electronically signed on: 04/27/2018 17:03    and Transthoracic echo (TTE) complete (Cupid Only):   Results for orders placed or performed during the hospital encounter of 05/09/22   Echo   Result Value Ref Range    BSA 2.1 m2    TDI SEPTAL 0.08 m/s    LV LATERAL E/E' RATIO 3.55 m/s    LV SEPTAL E/E' RATIO 4.88 m/s    LA WIDTH 4.76 cm    TDI LATERAL 0.11 m/s    LVIDd 7.11 (A) 3.5 - 6.0 cm    IVS 0.57 (A) 0.6 - 1.1 cm    Posterior Wall 1.13 (A) 0.6 - 1.1 cm    LVIDs 6.08 (A) 2.1 - 4.0 cm    FS 14 28 - 44 %    LA volume 75.65 cm3    Sinus 3.91 cm    STJ 3.34 cm    Ascending aorta 3.83 cm    LV mass 270.48 g    LA size 3.57 cm    RVDD 3.02 cm    RV S' 6.65 cm/s    Left Ventricle Relative Wall Thickness 0.32 cm    AV mean gradient 3 mmHg    AV valve area 3.58 cm2    AV Velocity Ratio 0.71     AV index (prosthetic) 0.80     MV valve area p 1/2  "method 6.74 cm2    E/A ratio 0.47     Mean e' 0.10 m/s    E wave deceleration time 112.61 msec    IVRT 117.03 msec    MV "A" wave duration 17.98 msec    Pulm vein S/D ratio 1.05     LVOT diameter 2.38 cm    LVOT area 4.4 cm2    LVOT peak russel 0.95 m/s    LVOT peak VTI 16.00 cm    Ao peak russel 1.33 m/s    Ao VTI 19.88 cm    LVOT stroke volume 71.14 cm3    AV peak gradient 7 mmHg    E/E' ratio 4.11 m/s    MV Peak E Russel 0.39 m/s    TR Max Russel 2.36 m/s    MV stenosis pressure 1/2 time 32.66 ms    MV Peak A Russel 0.83 m/s    PV Peak S Russel 0.43 m/s    PV Peak D Russel 0.41 m/s    LV Systolic Volume 185.44 mL    LV Systolic Volume Index 90.9 mL/m2    LV Diastolic Volume 264.53 mL    LV Diastolic Volume Index 129.67 mL/m2    LA Volume Index 37.1 mL/m2    LV Mass Index 133 g/m2    RA Major Axis 3.72 cm    Left Atrium Minor Axis 5.13 cm    Left Atrium Major Axis 5.35 cm    Triscuspid Valve Regurgitation Peak Gradient 22 mmHg    LA Volume Index (Mod) 42.9 mL/m2    LA volume (mod) 87.54 cm3    RA Width 3.13 cm    Right Atrial Pressure (from IVC) 3 mmHg    EF 15 %    TV rest pulmonary artery pressure 25 mmHg    Narrative    · The left ventricle is severely enlarged with eccentric hypertrophy and   severely decreased systolic function. The estimated ejection fraction is   15%.  · There is left ventricular global hypokinesis.  · Grade I left ventricular diastolic dysfunction.  · Normal right ventricular size with normal right ventricular systolic   function.  · Mild left atrial enlargement.  · Mild tricuspid regurgitation.  · The estimated PA systolic pressure is 25 mmHg.  · Normal central venous pressure (3 mmHg).        "

## 2022-05-28 NOTE — ED TRIAGE NOTES
"Pt reports generalized weakness that started today.  Pt states that he was "going out".  On arrival, unable to obtain BP in triage.  Pt states that he is on the list for a LVAD.  Pt denies CP.  Pt arrives with Dobutamine infusing @ 2.5 mcg/kg/min to right PICC line  "

## 2022-05-28 NOTE — HPI
Audrey Oneil is a 72 y/o M with ICM (EF 15%) on home  2.5 (previously refused LVAD, but now amenable), s/p St-Rodri CRT-D 5/2019, history of VT, HTN, HLD, CAD s/p MI who presents for lightheadedness and low blood pressure. Pt seen several times in the ED in the last month for pre-syncopal sx and low blood pressure.      Pt last seen ED 6 days ago with similar issues with Jardiance was discontinued. He was last seen by Dr. Henry and instructed to decrease his diuretic once daily due to issues with hypotension and prerenal LAVERNE. Since then pt has been compliant with his medications. Pt reports he took his Entresto 24-26 (BID), Lasix 40mg and aldatone 25mg today around noon. About 2 hrs later pt felt lightheaded and chekced his BP which he noted to be low so he presented to the ED      BP in the 70s on arrival and improved to 110s with 250cc IVF. Cr was 1.6--> 2.1 (Baseline 1.5-1.9), LFTs wnl LA 1.8 with positive orthostatics. Pt saturating well on RA, HR 70s-80s CXR unremarkable and overall appears dry on exam.       5/9/22 TTE   The left ventricle is severely enlarged with eccentric hypertrophy and severely decreased systolic function. The estimated ejection fraction is 15%.  There is left ventricular global hypokinesis.  Grade I left ventricular diastolic dysfunction.  Normal right ventricular size with normal right ventricular systolic function.  Mild left atrial enlargement.  Mild tricuspid regurgitation.  The estimated PA systolic pressure is 25 mmHg.  Normal central venous pressure (3 mmHg).

## 2022-05-28 NOTE — ASSESSMENT & PLAN NOTE
2/2 iatrogenic hypotension  -hold home entresto 24-26, spirinolactone 25mg daily and lasix 40mg daily  -s/p 250 cc IVF  -please give additional 250 cc IVF then repeat orthostatics   -continue home dobutamine 2.5 mg for now  -please draw sv02 and CVP from PICC  -limited TTE

## 2022-05-29 NOTE — NURSING
Patient arrived to the unit bed 347. No alert x4, No complaints of SOB or chest pain currently on Ra. Vitally stable,   Ambulatory. dobutamine GTT infusing @ 2.5 mcg. No skin issues noted. Family at bedside.  Right picc line  No blood return, Md notified TPA ordered. Call light with in reach. No complaints at the moment will continue to monitor.      BP (!) 97/54 (BP Location: Left arm, Patient Position: Lying)   Pulse 82   Temp 98.1 °F (36.7 °C) (Temporal)   Resp 18   Wt 90.7 kg (200 lb)   SpO2 97%   BMI 32.28 kg/m²

## 2022-05-29 NOTE — CONSULTS
Pt admitted to [facility]. Please see H&P dated 5/28/22 for details.     Paola Hale MD  Cardiology, PGY VI  Pager: [phone]

## 2022-05-29 NOTE — SUBJECTIVE & OBJECTIVE
Interval History: Pt did well overnight, no more hypotensive episodes and denies any further symptoms. BP stable this am. Planned to be discharged home, while resuming his entresto, Jiardiance, aldactone, and change lasix to prn    Continuous Infusions:   DOBUTamine IV infusion (non-titrating) 2.5 mcg/kg/min (05/28/22 1820)     Scheduled Meds:   allopurinoL  200 mg Oral Q48H    amiodarone  300 mg Oral Daily    aspirin  81 mg Oral Daily    atorvastatin  80 mg Oral QHS    enoxaparin  40 mg Subcutaneous Daily    mupirocin   Nasal BID    senna-docusate 8.6-50 mg  1 tablet Oral BID     PRN Meds:acetaminophen, HYDROcodone-acetaminophen, melatonin, ondansetron, simethicone, sodium chloride 0.9%, sodium chloride 0.9%    Review of patient's allergies indicates:   Allergen Reactions    Iodine containing multivitamin Swelling     itching    Keflex [cephalexin] Swelling     Eyes.  Tolerated multiple doses of zosyn and 1 dose of augmentin in 2015 and 2016, respectively    Peaches [peach (prunus persica)] Swelling     eyes    Shellfish containing products Swelling    Fig tree Swelling     itching    Tuberculin spenser test ppd Rash     Objective:     Vital Signs (Most Recent):  Temp: 97.5 °F (36.4 °C) (05/29/22 0500)  Pulse: 81 (05/29/22 0838)  Resp: 18 (05/29/22 0838)  BP: (!) 119/58 (05/29/22 0838)  SpO2: 95 % (05/29/22 0500)   Vital Signs (24h Range):  Temp:  [97.5 °F (36.4 °C)-98.3 °F (36.8 °C)] 97.5 °F (36.4 °C)  Pulse:  [69-96] 81  Resp:  [14-18] 18  SpO2:  [95 %-100 %] 95 %  BP: ()/(50-60) 119/58     Patient Vitals for the past 72 hrs (Last 3 readings):   Weight   05/28/22 2308 97 kg (213 lb 13.5 oz)   05/28/22 1701 90.7 kg (200 lb)     Body mass index is 33.49 kg/m².      Intake/Output Summary (Last 24 hours) at 5/29/2022 1106  Last data filed at 5/29/2022 0500  Gross per 24 hour   Intake 400 ml   Output 500 ml   Net -100 ml       Hemodynamic Parameters:     Physical Exam:  BP (!) 119/58 (BP Location: Left leg, Patient  "Position: Lying)   Pulse 81   Temp 97.5 °F (36.4 °C) (Temporal)   Resp 18   Ht 5' 7" (1.702 m)   Wt 97 kg (213 lb 13.5 oz)   SpO2 95%   BMI 33.49 kg/m²     Constitutional: No distress, obese, conversant  HEENT: Sclera anicteric, PERRLA, EOMI  Neck: No JVD, no masses, good movement  CV: RRR, S1 and S2 normal, no additional heart sounds or murmurs. Pulses 2+ and equal bilaterally in radial arteries, Nikolas's normal on right. Distal pulses are 2+ and equal in the femoral, DP and PT areas bilaterally  Pulm: Clear to auscultation bilaterally with symmetrical expansion. Chest wall palpated for reproduction of pain symptoms, and no pain was able to be produced on palpation or resistance exercises  GI: Abdomen soft, non-tender, good bowel sounds  Extremities: Both extremities intact and grossly normal, skin is warm, no edema noted    Significant Labs:  CBC:  Recent Labs   Lab 05/22/22  1747 05/28/22  1645 05/29/22  0605   WBC 6.34 6.75 5.68   RBC 4.39* 4.32* 4.30*   HGB 12.6* 12.0* 12.1*   HCT 39.1* 37.1* 39.2*    270 240   MCV 89 86 91   MCH 28.7 27.8 28.1   MCHC 32.2 32.3 30.9*     BNP:  Recent Labs   Lab 05/22/22  1747   *     CMP:  Recent Labs   Lab 05/28/22 1645 05/28/22  1824 05/29/22  0605   * 93 96  96   CALCIUM 9.1 8.6* 9.1  9.1   ALBUMIN 3.6 3.6 3.6  3.6   PROT 6.5 6.4 6.2  6.2   * 137 138  138   K 4.1 4.0 4.1  4.1   CO2 20* 19* 21*  21*    106 105  105   BUN 39* 39* 35*  35*   CREATININE 2.1* 2.0* 1.8*  1.8*   ALKPHOS 72 63 69  69   ALT 17 18 17  17   AST 18 18 18  18   BILITOT 0.5 0.5 0.4  0.4      Coagulation:   Recent Labs   Lab 05/28/22  1824 05/29/22  0605   INR 1.0 1.0     LDH:  No results for input(s): LDH in the last 72 hours.  Microbiology:  Microbiology Results (last 7 days)       Procedure Component Value Units Date/Time    Blood culture x two cultures. Draw prior to antibiotics. [[id number]] Collected: 05/28/22 1647    Order Status: Completed " Specimen: Blood from Peripheral, Antecubital, Right Updated: 05/29/22 0115     Blood Culture, Routine No Growth to date    Narrative:      Aerobic and anaerobic    Blood culture x two cultures. Draw prior to antibiotics. [[id number]] Collected: 05/28/22 1647    Order Status: Completed Specimen: Blood from Peripheral, Forearm, Left Updated: 05/29/22 0115     Blood Culture, Routine No Growth to date    Narrative:      Aerobic and anaerobic            I have reviewed all pertinent labs within the past 24 hours.    Estimated Creatinine Clearance: 42.4 mL/min (A) (based on SCr of 1.8 mg/dL (H)).    Diagnostic Results:  I have reviewed and interpreted all pertinent imaging results/findings within the past 24 hours.

## 2022-05-29 NOTE — HOSPITAL COURSE
Audrey Oneil is a 72 y/o M with ICM (EF 15%) on home  2.5 (previously refused LVAD, but now amenable), s/p St-Rodri CRT-D 5/2019, history of VT, HTN, HLD, CAD s/p MI who presented to ED for lightheadedness and low blood pressure. Pt had been seen several times in the ED in the last month for pre-syncopal sx and low blood pressure. His GDMT at the time of admission included entresto 24/26 mg BID, lasix 40mg daily, aldactone 25mg daily (Jardiance was recently stopped after a similar ED visit). His GDMT was held and he was admitted for observation. GDMT resumed with low dose entresto, Jardiance 10mg instaed of 25, aldactone 25, and lasix 40 prn instead of daily. He was advised to call HF clinic in case of similar symptoms and report to ER if symptoms persist or worsen.

## 2022-05-29 NOTE — PT/OT/SLP EVAL
"Occupational Therapy   Co-Evaluation/Treatment with PT and Discharge Note  Co-treat with PT due to medical complexity of pt and need for skilled hands for safe intervention.      Name: Audrey Oneil Jr.  MRN: [id number]  Admitting Diagnosis:  <principal problem not specified>   Recent Surgery: * No surgery found *      Recommendations:     Discharge Recommendations: home  Discharge Equipment Recommendations:  none  Barriers to discharge:  None    Assessment:     Audrey Oneil Jr. is a 72 y.o. male with a medical diagnosis of <principal problem not specified>. At this time, patient is functioning at their prior level of function and does not require further acute OT services. Reorder if status deteriorates.    Plan:     During this hospitalization, patient does not require further acute OT services.  Please re-consult if situation changes.    · Plan of Care Reviewed with: patient, family    Subjective     Chief Complaint: "I just want to get home."  Patient/Family Comments/goals: "I was a ." Pt has goal of getting home as soon as possible.    Occupational Profile:  Living Environment: Pt lives with sister, CRISTIANE, and their children in a [facility] with 4 [facility] with B HR. Pt has t/s combo in bathroom.  Previous level of function: Pt reports being Independent with All ADLs and mobility  Roles and Routines: Uncle, brother, CRISTIANE, retired, and only drives in emergency, yet still able to and usually gets rides from family members.  Equipment Used at home:  wheelchair, walker, rolling, bedside commode (Pt currently not using items listed)  Assistance upon Discharge: 24/7 assistance from family upon return to home environment.    Pain/Comfort:  · Pain Rating 1: 0/10  · Pain Addressed 1: Reposition, Distraction, Nurse notified (Pt IV bleeding during session and RN notified)  · Pain Rating Post-Intervention 1: 0/10    Patients cultural, spiritual, Sikh conflicts given the current situation: no    Objective: "     Communicated with: RN prior to session.  Patient found HOB elevated with telemetry, peripheral IV, bed alarm upon OT entry to room.    General Precautions: Standard, fall   Orthopedic Precautions:N/A   Braces: N/A  Respiratory Status: Room air     Occupational Performance:    Bed Mobility:    · Patient completed Rolling/Turning to Right with supervision  · Patient completed Scooting/Bridging with supervision  · Patient completed Supine to Sit with supervision    Functional Mobility/Transfers:  · Patient completed Sit <> Stand Transfer with supervision  with  no assistive device   · Patient completed Bed <> Chair Transfer using Step Transfer technique with supervision with no assistive device  · Functional Mobility: Pt able to engage in community distance/household distance greater than 80 ft in hallway from EOB and return to bedside chair with Supervision and no AD.    Activities of Daily Living:  · Grooming: supervision to complete facial hygiene while seated at EOB.  · Upper Body Dressing: supervision to don personal overhead tshirt while seated at EOB.    Cognitive/Visual Perceptual:  Cognitive/Psychosocial Skills:     -       Oriented to: Person, Place, Time and Situation   -       Follows Commands/attention:Follows multistep  commands  -       Communication: clear/fluent  -       Memory: No Deficits noted  -       Safety awareness/insight to disability: intact   -       Mood/Affect/Coping skills/emotional control: Appropriate to situation, Cooperative, Happy and Pleasant  Visual/Perceptual:      -Intact .    Physical Exam:  Postural examination/scapula alignment:    -       Rounded shoulders  -       Forward head  Skin integrity: Visible skin intact  Edema:  None noted  Sensation:    -       Intact  Dominant hand:    -       Right handed  Upper Extremity Range of Motion:     -       Right Upper Extremity: WFL  -       Left Upper Extremity: WFL  Upper Extremity Strength:    -       Right Upper Extremity:  WFL  -       Left Upper Extremity: WFL   Strength:    -       Right Upper Extremity: WFL  -       Left Upper Extremity: WFL  Fine Motor Coordination:    -       Intact  Gross motor coordination:   WFL     · Balance:  · Static Sitting: Supervision  · Dynamic Sitting: Supervision   · Static Standing: Supervision   · Dynamic Standing: Supervision        AMPAC 6 Click ADL:  AMPAC Total Score: 24    Treatment & Education:  -OT POC, safety during ADLs and mobility   -Education on energy conservation and task modification to maximize safety and independence  -Questions answered within OT scope of practice.    Education:    Patient left up in chair with all lines intact, call button in reach, RN notified and family present    GOALS:   Multidisciplinary Problems     Occupational Therapy Goals     Not on file          Multidisciplinary Problems (Resolved)        Problem: Occupational Therapy    Goal Priority Disciplines Outcome Interventions   Occupational Therapy Goal   (Resolved)     OT, PT/OT Met                    History:     Past Medical History:   Diagnosis Date    Acute hypoxemic respiratory failure 11/7/2015    Acute idiopathic gout of left knee 12/2/2019    Acute idiopathic gout of right elbow 9/23/2021    AICD (automatic cardioverter/defibrillator) present 12/13/2015      Anticoagulant long-term use     Bronchopneumonia 12/20/2021    CHF (congestive heart failure)     Chronic anticoagulation 5/5/2016    Chronic combined systolic and diastolic heart failure 11/26/2012    EF 10-20% on ECHO 2013    Chronic gout 12/2/2019    Clotting disorder     Coronary artery disease involving native coronary artery of native heart without angina pectoris 11/26/2012    Cath 10- Stents patent non-obstructive disease Cath 11-12015 non-obstructive disease     COVID-19 08/2021    Had antibody infusion    Diverticulosis of colon     DVT (deep venous thrombosis), unspecified laterality 11/12/2015     Dysphonia 1/28/2018    Essential hypertension 11/15/2015    Fine motor impairment 2/2/2021    Hyperlipidemia     Hypertensive heart disease with heart failure 5/5/2016    MI (myocardial infarction) 2009    x's 5    Nicotine abuse     Obesity 11/26/2012    Olecranon bursitis of right elbow 9/19/2021    Pulmonary embolism 2011    Renal disorder     LAVERNE    Right carpal tunnel syndrome 4/6/2018    Stented coronary artery 11/26/2012    LAD stent placed 10/17/2007     Trigger thumb of right hand 4/6/2018       Past Surgical History:   Procedure Laterality Date    APPENDECTOMY      BACK SURGERY      CARDIAC DEFIBRILLATOR PLACEMENT      CARDIAC SURGERY  2007    stent    CARPAL TUNNEL RELEASE Right 04/2018    INSERTION OF BIVENTRICULAR IMPLANTABLE CARDIOVERTER-DEFIBRILLATOR (ICD) N/A 5/3/2019    Procedure: INSERTION, ICD, BIVENTRICULAR;  Surgeon: eTofilo Pal MD;  Location: [facility] EP LAB;  Service: Cardiology;  Laterality: N/A;  ICH CM,  ICD UPGD BI-V,  SJM, MAC, FAS, 3PREP (dual ICD INSITU)    r knee scope      REVISION OF SKIN POCKET FOR CARDIOVERTER-DEFIBRILLATOR Left 5/3/2019    Procedure: Revision, Skin Pocket, For Cardioverter-Defibrillator;  Surgeon: Teofilo Pal MD;  Location: [facility] EP LAB;  Service: Cardiology;  Laterality: Left;    RIGHT HEART CATHETERIZATION Right 6/14/2021    Procedure: INSERTION, CATHETER, RIGHT HEART;  Surgeon: Lizz Nieto MD;  Location: [facility] CATH LAB;  Service: Cardiology;  Laterality: Right;    SPINE SURGERY      TONSILLECTOMY      TRIGGER FINGER RELEASE Right 04/2018    thumb       Time Tracking:     OT Date of Treatment: 05/29/22  OT Start Time: 0950  OT Stop Time: 1010  OT Total Time (min): 20 min    Billable Minutes:Evaluation 10 min  Self Care/Home Management 10 min    5/29/2022

## 2022-05-29 NOTE — PROGRESS NOTES
Patient and spouse received discharge instruction and paperwork and confirmed understanding of discharge instructions. Patient was escorted to hospital entrance in a wheel chair by Nurse to personal vehicle driven by friend.

## 2022-05-29 NOTE — PLAN OF CARE
Eval complete, no OT needs at this time.  Safe to return home when medically clear.  Reorder OT if status deteriorates.  Kade Powell, OT  5/29/2022

## 2022-05-29 NOTE — PROGRESS NOTES
[facility] - Cardiology Stepdown  Heart Transplant  Progress Note    Patient Name: Audrey Oneil Jr.  MRN: [id number]  Admission Date: 5/28/2022  Hospital Length of Stay: 1 days  Attending Physician: Migue Henry Jr.,*  Primary Care Provider: January Khan MD  Principal Problem:<principal problem not specified>    Subjective:     Interval History: Pt did well overnight, no more hypotensive episodes and denies any further symptoms. BP stable this am. Planned to be discharged home, while resuming his entresto, Jiardiance, aldactone, and change lasix to prn    Continuous Infusions:   DOBUTamine IV infusion (non-titrating) 2.5 mcg/kg/min (05/28/22 1820)     Scheduled Meds:   allopurinoL  200 mg Oral Q48H    amiodarone  300 mg Oral Daily    aspirin  81 mg Oral Daily    atorvastatin  80 mg Oral QHS    enoxaparin  40 mg Subcutaneous Daily    mupirocin   Nasal BID    senna-docusate 8.6-50 mg  1 tablet Oral BID     PRN Meds:acetaminophen, HYDROcodone-acetaminophen, melatonin, ondansetron, simethicone, sodium chloride 0.9%, sodium chloride 0.9%    Review of patient's allergies indicates:   Allergen Reactions    Iodine containing multivitamin Swelling     itching    Keflex [cephalexin] Swelling     Eyes.  Tolerated multiple doses of zosyn and 1 dose of augmentin in 2015 and 2016, respectively    Peaches [peach (prunus persica)] Swelling     eyes    Shellfish containing products Swelling    Fig tree Swelling     itching    Tuberculin spenser test ppd Rash     Objective:     Vital Signs (Most Recent):  Temp: 97.5 °F (36.4 °C) (05/29/22 0500)  Pulse: 81 (05/29/22 0838)  Resp: 18 (05/29/22 0838)  BP: (!) 119/58 (05/29/22 0838)  SpO2: 95 % (05/29/22 0500)   Vital Signs (24h Range):  Temp:  [97.5 °F (36.4 °C)-98.3 °F (36.8 °C)] 97.5 °F (36.4 °C)  Pulse:  [69-96] 81  Resp:  [14-18] 18  SpO2:  [95 %-100 %] 95 %  BP: ()/(50-60) 119/58     Patient Vitals for the past 72 hrs (Last 3 readings):   Weight   05/28/22  "2308 97 kg (213 lb 13.5 oz)   05/28/22 1701 90.7 kg (200 lb)     Body mass index is 33.49 kg/m².      Intake/Output Summary (Last 24 hours) at 5/29/2022 1106  Last data filed at 5/29/2022 0500  Gross per 24 hour   Intake 400 ml   Output 500 ml   Net -100 ml       Hemodynamic Parameters:     Physical Exam:  BP (!) 119/58 (BP Location: Left leg, Patient Position: Lying)   Pulse 81   Temp 97.5 °F (36.4 °C) (Temporal)   Resp 18   Ht 5' 7" (1.702 m)   Wt 97 kg (213 lb 13.5 oz)   SpO2 95%   BMI 33.49 kg/m²     Constitutional: No distress, obese, conversant  HEENT: Sclera anicteric, PERRLA, EOMI  Neck: No JVD, no masses, good movement  CV: RRR, S1 and S2 normal, no additional heart sounds or murmurs. Pulses 2+ and equal bilaterally in radial arteries, Nikolas's normal on right. Distal pulses are 2+ and equal in the femoral, DP and PT areas bilaterally  Pulm: Clear to auscultation bilaterally with symmetrical expansion. Chest wall palpated for reproduction of pain symptoms, and no pain was able to be produced on palpation or resistance exercises  GI: Abdomen soft, non-tender, good bowel sounds  Extremities: Both extremities intact and grossly normal, skin is warm, no edema noted    Significant Labs:  CBC:  Recent Labs   Lab 05/22/22 1747 05/28/22  1645 05/29/22  0605   WBC 6.34 6.75 5.68   RBC 4.39* 4.32* 4.30*   HGB 12.6* 12.0* 12.1*   HCT 39.1* 37.1* 39.2*    270 240   MCV 89 86 91   MCH 28.7 27.8 28.1   MCHC 32.2 32.3 30.9*     BNP:  Recent Labs   Lab 05/22/22  1747   *     CMP:  Recent Labs   Lab 05/28/22  1645 05/28/22  1824 05/29/22  0605   * 93 96  96   CALCIUM 9.1 8.6* 9.1  9.1   ALBUMIN 3.6 3.6 3.6  3.6   PROT 6.5 6.4 6.2  6.2   * 137 138  138   K 4.1 4.0 4.1  4.1   CO2 20* 19* 21*  21*    106 105  105   BUN 39* 39* 35*  35*   CREATININE 2.1* 2.0* 1.8*  1.8*   ALKPHOS 72 63 69  69   ALT 17 18 17  17   AST 18 18 18  18   BILITOT 0.5 0.5 0.4  0.4      Coagulation: "   Recent Labs   Lab 05/28/22 1824 05/29/22  0605   INR 1.0 1.0     LDH:  No results for input(s): LDH in the last 72 hours.  Microbiology:  Microbiology Results (last 7 days)       Procedure Component Value Units Date/Time    Blood culture x two cultures. Draw prior to antibiotics. [[id number]] Collected: 05/28/22 1647    Order Status: Completed Specimen: Blood from Peripheral, Antecubital, Right Updated: 05/29/22 0115     Blood Culture, Routine No Growth to date    Narrative:      Aerobic and anaerobic    Blood culture x two cultures. Draw prior to antibiotics. [[id number]] Collected: 05/28/22 1647    Order Status: Completed Specimen: Blood from Peripheral, Forearm, Left Updated: 05/29/22 0115     Blood Culture, Routine No Growth to date    Narrative:      Aerobic and anaerobic            I have reviewed all pertinent labs within the past 24 hours.    Estimated Creatinine Clearance: 42.4 mL/min (A) (based on SCr of 1.8 mg/dL (H)).    Diagnostic Results:  I have reviewed and interpreted all pertinent imaging results/findings within the past 24 hours.    Assessment and Plan:   Audrey Oneil is a 70 y/o M with ICM (EF 15%) on home  2.5 (previously refused LVAD, but now amenable), s/p St-Rodri CRT-D 5/2019, history of VT, HTN, HLD, CAD s/p MI who presents for lightheadedness and low blood pressure. Pt seen several times in the ED in the last month for pre-syncopal sx and low blood pressure. His GDMT at the time of admission included entresto 24/26 mg BID, lasix 40mg daily, aldactone 25mg daily (jiardiance was recently stopped after a similar ED visit)    H/O ventricular tachycardia  -continue home amiodarone of 300 mg daily       Postural dizziness with presyncope  Secondary to  Hypotension due to being dry and also that he is unable to tolerate his GDMT, s/p 250 cc IVF  -Upon admission home GDMT held, will resume entresto 24-26, spirinolactone 25mg daily and  Jardiance. Change lasix to prn   -continue home dobutamine  2.5 mg through PICC  -Will discharge today with above regimen and follow up in clinic     Coronary artery disease involving native coronary artery of native heart without angina pectoris  -continue asa and statin    Pt care discussed with Dr Carl Kearney MD  Heart Transplant  [facility] - Cardiology Stepdown

## 2022-05-29 NOTE — DISCHARGE INSTRUCTIONS
Please call Heart failur clinic in case of similar symptoms and report to ER if symptoms persist or worsen.

## 2022-05-29 NOTE — PLAN OF CARE
Problem: Physical Therapy  Goal: Physical Therapy Goal  Description: The patient is near his functional baseline, ambulated 150' with no AD and supervision assistance, asymptomatic. Patient is safe to return home and resume OP PT when appropriate. Discharge acute PT, patient is safe to ambulate with RN supervision.   Carmen Calzada, PT  5/29/2022    Outcome: Ongoing, Progressing

## 2022-05-29 NOTE — PT/OT/SLP EVAL
"Physical Therapy Evaluation and Discharge Note  Co-evaluation with OT due to acuity of condition, level of skilled assist needed for assessment of safety with mobility.   Patient Name:  Audrey Oneil Jr.   MRN:  [id number]    Recommendations:     Discharge Recommendations:  home, outpatient PT   Discharge Equipment Recommendations: none   Barriers to discharge: None    Assessment:     Audrey Oneil Jr. is a 72 y.o. male admitted with a medical diagnosis of Acute on chronic combined systolic and diastolic heart failure. .  At this time, patient is functioning at their prior level of function and does not require further acute PT services.     Recent Surgery: * No surgery found *      Plan:     During this hospitalization, patient does not require further acute PT services.  Please re-consult if situation changes.      Subjective     Chief Complaint: "I was getting lightheaded, my blood pressure was really low, like in the 60's"  Patient/Family Comments/goals: "I don't think I can get back to my hobbies, I like scuba diving and spear fishing", patient motivated to maximize functional independence  Pain/Comfort:  · Pain Rating 1: 0/10    Patients cultural, spiritual, Restorationist conflicts given the current situation: no    Living Environment:  The patient lives with his sister and brother-in-law in [facility], [address] [facility] with [employer]. R handed. Tub-shower. Drives. Retired. No recent falls.  Prior to admission, patients level of function was independent with gait.  Equipment used at home:  (owns wc, RW, BSC).  DME owned (not currently used): none.  Upon discharge, patient will have assistance from family.    Objective:     Communicated with RN prior to session.  Patient found HOB elevated with bed alarm, telemetry, peripheral IV upon PT entry to room.    General Precautions: Standard, fall   Orthopedic Precautions:N/A   Braces: N/A   Respiratory Status: Room air    Exams:    Cognitive Exam  Patient is A&O x4 and follows 100% of one " -step commands    Fine Motor Coordination   -       WNL     Postural Exam Patient presented with the following abnormalities:    -       Rounded shoulders  -       Forward head  -       Kyphosis  -       Posterior pelvic tilt     Sensation    -       Light touch chronic decreased sensation R LE, L UE   Skin Integrity/Edema     -       Skin integrity: visibly intact, L hand IV bleeding, RN alerted  -       Edema: mild CRISTIANE lower legs   R LE ROM WNL   R LE Strength 4+/5 hip flexion, knee ext/flex, and ankle DF/PF   L LE ROM WNL   L LE Strength  4+/5 hip flexion, knee ext/flex, and ankle DF/PF       Balance   Static Sitting independent    Dynamic Sitting independent    Static Standing supervision assistance    Dynamic Standing       supervision assistance           Functional Mobility:    Bed Mobility  Supine to Sit on the R side:  supervision assistance      Transfers Sit to Stand:  supervision assistance    Gait  Gait Distance: 150 ft with no AD  Assistance Level: supervision assistance   Description: increased lateral sway, decreased step length, decreased gait speed     Patient deferred stair training, states he does not need to practice stairs     Orthostatic Hypotension negative, NIBP R ankle        Position Blood Pressure MAP   HOB 30 deg 125/68 89   Sitting EOB 5 min 177/85, BP taken after several cycles, RN alerted, suspect inaccurate reading, pt asymptomatic 122        AM-PAC 6 CLICK MOBILITY  Total Score:24       Therapeutic Activities and Exercises:  Patient educated on role of therapy, goals of session, benefits of out of bed mobility. Patient agreeable to mobilize with therapy.  Discussed PT plan of care during hospitalization. Patient educated that they need to call for assistance to mobilize out of bed. Whiteboard updated as appropriate. Patient educated on how their diagnosis impacts their mobility within PT scope of practice.     Gait training: cued for pacing and energy conservation, symptom  monitoring    Patient educated on PT schedule.  Encouraged patient to ambulate, sit up in chair 3x/day to prevent deconditioning during hospitalization. Patient verbalized understanding and agreement not to mobilize without RN assist. Patient in agreement with PT POC, OP PT.    Patient safe to ambulate with RN assist x1 person.       AM-PAC 6 CLICK MOBILITY  Total Score:24     Patient left up in chair with all lines intact and call button in reach.    GOALS:   Multidisciplinary Problems     Physical Therapy Goals        Problem: Physical Therapy    Goal Priority Disciplines Outcome Goal Variances Interventions   Physical Therapy Goal     PT, PT/OT Ongoing, Progressing     Description: The patient is near his functional baseline, ambulated 150' with no AD and supervision assistance, asymptomatic. Patient is safe to return home and resume OP PT when appropriate. Discharge acute PT, patient is safe to ambulate with RN supervision.   Carmen Calzada, PT  5/29/2022                     History:     Past Medical History:   Diagnosis Date    Acute hypoxemic respiratory failure 11/7/2015    Acute idiopathic gout of left knee 12/2/2019    Acute idiopathic gout of right elbow 9/23/2021    AICD (automatic cardioverter/defibrillator) present 12/13/2015      Anticoagulant long-term use     Bronchopneumonia 12/20/2021    CHF (congestive heart failure)     Chronic anticoagulation 5/5/2016    Chronic combined systolic and diastolic heart failure 11/26/2012    EF 10-20% on ECHO 2013    Chronic gout 12/2/2019    Clotting disorder     Coronary artery disease involving native coronary artery of native heart without angina pectoris 11/26/2012    Cath 10- Stents patent non-obstructive disease Cath 11-12015 non-obstructive disease     COVID-19 08/2021    Had antibody infusion    Diverticulosis of colon     DVT (deep venous thrombosis), unspecified laterality 11/12/2015    Dysphonia 1/28/2018    Essential  hypertension 11/15/2015    Fine motor impairment 2/2/2021    Hyperlipidemia     Hypertensive heart disease with heart failure 5/5/2016    MI (myocardial infarction) 2009    x's 5    Nicotine abuse     Obesity 11/26/2012    Olecranon bursitis of right elbow 9/19/2021    Pulmonary embolism 2011    Renal disorder     LAVERNE    Right carpal tunnel syndrome 4/6/2018    Stented coronary artery 11/26/2012    LAD stent placed 10/17/2007     Trigger thumb of right hand 4/6/2018       Past Surgical History:   Procedure Laterality Date    APPENDECTOMY      BACK SURGERY      CARDIAC DEFIBRILLATOR PLACEMENT      CARDIAC SURGERY  2007    stent    CARPAL TUNNEL RELEASE Right 04/2018    INSERTION OF BIVENTRICULAR IMPLANTABLE CARDIOVERTER-DEFIBRILLATOR (ICD) N/A 5/3/2019    Procedure: INSERTION, ICD, BIVENTRICULAR;  Surgeon: Teofilo Pal MD;  Location: [facility] EP LAB;  Service: Cardiology;  Laterality: N/A;  ICH CM,  ICD UPGD BI-V,  SJM, MAC, FAS, 3PREP (dual ICD INSITU)    r knee scope      REVISION OF SKIN POCKET FOR CARDIOVERTER-DEFIBRILLATOR Left 5/3/2019    Procedure: Revision, Skin Pocket, For Cardioverter-Defibrillator;  Surgeon: Teofilo Pal MD;  Location: [facility] EP LAB;  Service: Cardiology;  Laterality: Left;    RIGHT HEART CATHETERIZATION Right 6/14/2021    Procedure: INSERTION, CATHETER, RIGHT HEART;  Surgeon: Lizz Nieto MD;  Location: [facility] CATH LAB;  Service: Cardiology;  Laterality: Right;    SPINE SURGERY      TONSILLECTOMY      TRIGGER FINGER RELEASE Right 04/2018    thumb       Time Tracking:     PT Received On: 05/29/22  PT Start Time: 0950     PT Stop Time: 1012  PT Total Time (min): 22 min     Billable Minutes: Evaluation 11 and Gait Training 11      05/29/2022

## 2022-05-29 NOTE — DISCHARGE SUMMARY
[facility] - Cardiology Stepdown  Heart Transplant  Discharge Summary      Patient Name: Audrey Oneil Jr.  MRN: [id number]  Admission Date: 5/28/2022  Hospital Length of Stay: 1 days  Discharge Date and Time: 05/29/2022 2:00 PM  Attending Physician: Migue Henry Jr.,*   Discharging Provider: Rupinder Kearney MD  Primary Care Provider: January Khan MD     Hospital Course: Audrey Oneil is a 70 y/o M with ICM (EF 15%) on home  2.5 (previously refused LVAD, but now amenable), s/p St-Rodri CRT-D 5/2019, history of VT, HTN, HLD, CAD s/p MI who presented to ED for lightheadedness and low blood pressure. Pt had been seen several times in the ED in the last month for pre-syncopal sx and low blood pressure. His GDMT at the time of admission included entresto 24/26 mg BID, lasix 40mg daily, aldactone 25mg daily (Jardiance was recently stopped after a similar ED visit). His GDMT was held and he was admitted for observation. GDMT resumed with low dose entresto, Jardiance 10mg instaed of 25, aldactone 25, and lasix 40 prn instead of daily. He was advised to call HF clinic in case of similar symptoms and report to ER if symptoms persist or worsen. Off note patient is in work up for LVAD      Goals of Care Treatment Preferences:  Code Status: Full Code      Consults (From admission, onward)        Status Ordering Provider     IP consult to case management  Once        Provider:  (Not yet assigned)    Acknowledged MIGUE HENRY JR     Inpatient consult to Registered Dietitian/Nutritionist  Once        Provider:  (Not yet assigned)    Acknowledged WILLOW JEFFERSON     Inpatient consult to Cardiology  Once        Provider:  (Not yet assigned)    Completed SERVANDO NGUYEN          Significant Diagnostic Studies: Labs: All labs within the past 24 hours have been reviewed    Pending Diagnostic Studies:     Procedure Component Value Units Date/Time    Methylmalonic Acid, Serum [[id number]] Collected: 05/28/22 1822     Order Status: Sent Lab Status: In process Updated: 05/28/22 1837    Specimen: Blood     NT-PRO BNP, [place] [[id number]] Collected: 05/28/22 1707    Order Status: Sent Lab Status: In process Updated: 05/28/22 1711    Specimen: Blood         Final Active Diagnoses:    Diagnosis Date Noted POA    PRINCIPAL PROBLEM:  Acute on chronic combined systolic and diastolic heart failure [I50.43] 11/26/2012 Yes    H/O ventricular tachycardia [Z86.79] 01/20/2021 Not Applicable     Chronic    Ventricular tachycardia [I47.2] 11/13/2015 Yes    Postural dizziness with presyncope [R42, R55] 05/31/2014 Yes    Coronary artery disease involving native coronary artery of native heart without angina pectoris [I25.10] 11/26/2012 Yes     Chronic      Problems Resolved During this Admission:      Discharged Condition: fair    Disposition: Home or Self Care    Follow Up: In Clinic with Dr Burk    Patient Instructions:   Please call Heart failur clinic in case of similar symptoms and report to ER if symptoms persist or worsen.   Medications:  Reconciled Home Medications:      Medication List      CHANGE how you take these medications    empagliflozin 10 mg tablet  Commonly known as: JARDIANCE  Take 1 tablet (10 mg total) by mouth once daily.  What changed:   · medication strength  · how much to take     furosemide 40 MG tablet  Commonly known as: LASIX  Take 1 tablet (40 mg total) by mouth daily as needed (Weight gain of 3 lbs in one day or 5 lbs in one week).  What changed:   · when to take this  · reasons to take this  · additional instructions        CONTINUE taking these medications    allopurinoL 100 MG tablet  Commonly known as: ZYLOPRIM  Take 2 tablets (200 mg total) by mouth once daily.     amiodarone 200 MG Tab  Commonly known as: PACERONE  TAKE 1 AND 1/2 TABLETS(300 MG) BY MOUTH EVERY DAY     aspirin 81 MG EC tablet  Commonly known as: ECOTRIN  Take 1 tablet (81 mg total) by mouth once daily.     atorvastatin 80 MG  tablet  Commonly known as: LIPITOR  Take 1 tablet (80 mg total) by mouth once daily.     diphenhydrAMINE 25 mg capsule  Commonly known as: BENADRYL  Take 25 mg by mouth as needed for Allergies.     DOBUTamine 500 mg/250 mL (2,000 mcg/mL)  Commonly known as: DOBUTREX  2.5 mcg/kg/min  Patient is 95.7 kg     ENTRESTO 24-26 mg per tablet  Generic drug: sacubitriL-valsartan  Take 1 tablet by mouth 2 (two) times daily.     ondansetron 4 MG Tbdl  Commonly known as: ZOFRAN-ODT  Dissolve 1 tablet (4 mg total) by mouth every 6 (six) hours as needed (nausea).     OPW TEST CLAIM - DO NOT FILL  OPW test claim. Do not fill.     oxyCODONE-acetaminophen 5-325 mg per tablet  Commonly known as: PERCOCET  Take 1 tablet by mouth every 6 (six) hours as needed for Pain.     simethicone 80 MG chewable tablet  Commonly known as: MYLICON  Take 1 tablet (80 mg total) by mouth every 6 (six) hours as needed for Flatulence.     spironolactone 25 MG tablet  Commonly known as: ALDACTONE  Take 1 tablet (25 mg total) by mouth once daily.        STOP taking these medications    clopidogreL 75 mg tablet  Commonly known as: PLAVIX     colchicine 0.6 mg tablet  Commonly known as: COLCRYS     ipratropium 0.02 % nebulizer solution  Commonly known as: ATROVENT     metoprolol succinate 25 MG 24 hr tablet  Commonly known as: TOPROL-XL     midodrine 2.5 MG Tab  Commonly known as: PROAMATINE            Rupinder Kearney MD  Heart Transplant  [facility] - Cardiology Stepdown

## 2022-06-16 NOTE — TELEPHONE ENCOUNTER
Left message that we will schedule SW visit tomorrow after his RHC to bring his Caregiver.  Call back number left

## 2022-06-17 NOTE — Clinical Note
The PA catheter was repositioned to the main pulmonary artery. Hemodynamics were performed. Cardiac output was obtained at 5 L/min. O2 saturation was measured at 66%.  CO = 5.23   CI = 2.5

## 2022-06-17 NOTE — PATIENT INSTRUCTIONS
Increase Entresto to 49-51 mg twice a day and I sent in new prescription  Your weekly lab on  is sufficient for follow-up on this increase in dose    AFTER THE PROCEDURE:   -DO NOT DRINK OR EAT ANYTHING UNTIL NO LONGER HOARSE   -You may remove the bandage in 24 hours and wash with soap and water.   -You may shower, but do not soak in a tub for three days.   PRECAUTIONS FOR THE NEXT 24 HOURS:   -If you need to cough, sneeze, have a bowel movement, or bear down, hold pressure over your bandage.   -Do not  anything heavier than a gallon of milk(about 5 pounds)   -Avoid excessive bending over.   SYMPTOMS TO WATCH FOR AND REPORT TO YOUR DOCTOR:   -BLEEDING: hold pressure over the site until bleeding stops. Proceed to Emergency Room by ambulance (do not drive yourself) if unable to stop bleeding. Notify your doctor.   -HEMATOMA(hard bruise under the skin): Balbir around the bruise if one develops. Call your doctor if it increases in size or if you have difficulty talking, swallowing, breathing or anything unusual.   SIGNS OF INFECTION:Fever (temperature over 100.5 F), pus or redness   -RASH   -CHEST PAIN OR SHORTNESS OF BREATH   You may call you coordinator in the Heart Failure/Heart Transplant/Pulmonary Hypertension Clinic at [phone] during normal business hours(Monday through Friday from 8 A.M. to 5 P.M.) After hours, call the Heart Transplant Service doctor on call at [phone].

## 2022-06-17 NOTE — PLAN OF CARE
Patient arrived to room. PIV placed, labs sent. Admit assessment completed. Plan of care discussed with patient. Will monitor. Call light within reach, inst in use.

## 2022-06-17 NOTE — PLAN OF CARE
Received report from VINITA Vazquez. Patient s/p RHC, AAOx3. VSS, no c/o pain or discomfort at this time, resp even and unlabored. Gauze/tegaderm dressing to R neck is CDI. No active bleeding. No hematoma noted. Post procedure protocol reviewed with patient and patient's family. Understanding verbalized. Family members at bedside. Nurse call bell within reach. Will continue to monitor per post procedure protocol.

## 2022-06-17 NOTE — DISCHARGE SUMMARY
[facility] - Cath Lab  Discharge Note  Short Stay    Procedure(s) (LRB):  INSERTION, CATHETER, RIGHT HEART (Right)    OUTCOME: Patient tolerated treatment/procedure well without complication and is now ready for discharge.    DISPOSITION: Home or Self Care    FINAL DIAGNOSIS:  Chronic combined systolic and diastolic congestive heart failure    FOLLOWUP: In clinic    DISCHARGE INSTRUCTIONS:    Continue prior medications except increase Entresto to 49-51 mg twice a day  His weekly lab on  is sufficient for follow-up on this increase in dose  Resume previous diet and activity except as directed in patient instructions.  Continue f/u with HTS as directed prior to this procedure    TIME SPENT ON DISCHARGE: 10 minutes

## 2022-06-17 NOTE — OP NOTE
[facility] Lab Post Procedure Note    Patient tolerated the procedure well.   There were no complications.   Please see full report in EPIC for details.

## 2022-06-17 NOTE — Clinical Note
The PA catheter was repositioned to the pulmonary wedge. Hemodynamics were performed. O2 saturation was measured at 86%.

## 2022-06-17 NOTE — H&P
[facility] - Short Stay Cardiac Unit  History & Physical    Subjective:      Chief Complaint/Reason for Admission: here for RHC to assess his hemodynamics    Audrey Oneil Jr. is a 72 y.o. male. with ICM (EF 15%) on home  2.5 (previously refused LVAD, but now amenable), s/p St-Rodri CRT-D 5/2019, history of VT, HTN, HLD, CAD s/p MI who  Was recently for lightheadedness and low blood pressure. Pt had been seen several times in the ED in the last month for pre-syncopal sx and low blood pressure. His GDMT at the time of admission included entresto 24/26 mg BID, lasix 40mg daily, aldactone 25mg daily (Jardiance was recently stopped after a similar ED visit). His GDMT was held and he was admitted for observation. GDMT resumed with low dose entresto, Jardiance 10mg instaed of 25, aldactone 25, and lasix 40 prn instead of daily. He was advised to call HF clinic in case of similar symptoms and report to ER if symptoms persist or worsen. Off note patient is in work up for LVAD. He is here today for RHC. Today he is feeling good, denies any orthopnea, PND, LE swelling, and was able to walk from parking without stopping.        Past Medical History:   Diagnosis Date    Acute hypoxemic respiratory failure 11/7/2015    Acute idiopathic gout of left knee 12/2/2019    Acute idiopathic gout of right elbow 9/23/2021    AICD (automatic cardioverter/defibrillator) present 12/13/2015      Anticoagulant long-term use     Bronchopneumonia 12/20/2021    CHF (congestive heart failure)     Chronic anticoagulation 5/5/2016    Chronic combined systolic and diastolic heart failure 11/26/2012    EF 10-20% on ECHO 2013    Chronic gout 12/2/2019    Clotting disorder     Coronary artery disease involving native coronary artery of native heart without angina pectoris 11/26/2012    Cath 10- Stents patent non-obstructive disease Cath 11-12015 non-obstructive disease     COVID-19 08/2021    Had antibody infusion     Diverticulosis of colon     DVT (deep venous thrombosis), unspecified laterality 11/12/2015    Dysphonia 1/28/2018    Essential hypertension 11/15/2015    Fine motor impairment 2/2/2021    Hyperlipidemia     Hypertensive heart disease with heart failure 5/5/2016    MI (myocardial infarction) 2009    x's 5    Nicotine abuse     Obesity 11/26/2012    Olecranon bursitis of right elbow 9/19/2021    Pulmonary embolism 2011    Renal disorder     LAVERNE    Right carpal tunnel syndrome 4/6/2018    Stented coronary artery 11/26/2012    LAD stent placed 10/17/2007     Trigger thumb of right hand 4/6/2018     Past Surgical History:   Procedure Laterality Date    APPENDECTOMY      BACK SURGERY      CARDIAC DEFIBRILLATOR PLACEMENT      CARDIAC SURGERY  2007    stent    CARPAL TUNNEL RELEASE Right 04/2018    INSERTION OF BIVENTRICULAR IMPLANTABLE CARDIOVERTER-DEFIBRILLATOR (ICD) N/A 5/3/2019    Procedure: INSERTION, ICD, BIVENTRICULAR;  Surgeon: Teofilo Pal MD;  Location: [facility] EP LAB;  Service: Cardiology;  Laterality: N/A;  ICH CM,  ICD UPGD BI-V,  SJM, MAC, FAS, 3PREP (dual ICD INSITU)    r knee scope      REVISION OF SKIN POCKET FOR CARDIOVERTER-DEFIBRILLATOR Left 5/3/2019    Procedure: Revision, Skin Pocket, For Cardioverter-Defibrillator;  Surgeon: Teofilo Pal MD;  Location: [facility] EP LAB;  Service: Cardiology;  Laterality: Left;    RIGHT HEART CATHETERIZATION Right 6/14/2021    Procedure: INSERTION, CATHETER, RIGHT HEART;  Surgeon: Lizz Nieto MD;  Location: [facility] CATH LAB;  Service: Cardiology;  Laterality: Right;    SPINE SURGERY      TONSILLECTOMY      TRIGGER FINGER RELEASE Right 04/2018    thumb     Family History   Problem Relation Age of Onset    Cancer Mother         colon cancer    Heart disease Mother     Diabetes Mother     Heart disease Father     Stroke Father     Colon polyps Father     Cancer Paternal Grandmother         leukemia    No Known Problems Sister      No Known Problems Daughter     No Known Problems Son     No Known Problems Sister     No Known Problems Son      Social History     Tobacco Use    Smoking status: Former Smoker     Packs/day: 1.00     Years: 45.00     Pack years: 45.00     Types: Cigarettes     Quit date: 2005     Years since quittin.5    Smokeless tobacco: Never Used    Tobacco comment: 1-1.5 ppd every day.   Substance Use Topics    Alcohol use: No    Drug use: No       PTA Medications   Medication Sig    allopurinoL (ZYLOPRIM) 100 MG tablet Take 2 tablets (200 mg total) by mouth once daily.    amiodarone (PACERONE) 200 MG Tab TAKE 1 AND 1/2 TABLETS(300 MG) BY MOUTH EVERY DAY    aspirin (ECOTRIN) 81 MG EC tablet Take 1 tablet (81 mg total) by mouth once daily.    atorvastatin (LIPITOR) 80 MG tablet Take 1 tablet (80 mg total) by mouth once daily.    diphenhydrAMINE (BENADRYL) 25 mg capsule Take 25 mg by mouth as needed for Allergies.    DOBUTamine (DOBUTREX) 500 mg/250 mL (2,000 mcg/mL) 2.5 mcg/kg/min  Patient is 95.7 kg    empagliflozin (JARDIANCE) 10 mg tablet Take 1 tablet (10 mg total) by mouth once daily.    ondansetron (ZOFRAN-ODT) 4 MG TbDL Dissolve 1 tablet (4 mg total) by mouth every 6 (six) hours as needed (nausea).    oxyCODONE-acetaminophen (PERCOCET) 5-325 mg per tablet Take 1 tablet by mouth every 6 (six) hours as needed for Pain.    sacubitriL-valsartan (ENTRESTO) 24-26 mg per tablet Take 1 tablet by mouth 2 (two) times daily.    spironolactone (ALDACTONE) 25 MG tablet Take 1 tablet (25 mg total) by mouth once daily.    furosemide (LASIX) 40 MG tablet Take 1 tablet (40 mg total) by mouth daily as needed (Weight gain of 3 lbs in one day or 5 lbs in one week).    OPW TEST CLAIM - DO NOT FILL OPW test claim. Do not fill.    simethicone (MYLICON) 80 MG chewable tablet Take 1 tablet (80 mg total) by mouth every 6 (six) hours as needed for Flatulence.     Review of patient's allergies indicates:    Allergen Reactions    Iodine containing multivitamin Swelling     itching    Keflex [cephalexin] Swelling     Eyes.  Tolerated multiple doses of zosyn and 1 dose of augmentin in 2015 and 2016, respectively    Peaches [peach (prunus persica)] Swelling     eyes    Shellfish containing products Swelling    Fig tree Swelling     itching    Tuberculin spenser test ppd Rash        ROS negative except as per HPI    Objective:      Vital Signs (Most Recent)  BP: 113/60 (06/17/22 1258)    Vital Signs Range (Last 24H):  BP: (113)/(60)     Physical Exam  HENT:      Head: Normocephalic and atraumatic.   Eyes:      Extraocular Movements: Extraocular movements intact.   Cardiovascular:      Rate and Rhythm: Normal rate and regular rhythm.   Pulmonary:      Effort: Pulmonary effort is normal.   Abdominal:      General: Abdomen is flat.      Palpations: Abdomen is soft.   Musculoskeletal:         General: Normal range of motion.      Cervical back: Normal range of motion.      Comments: Right UE PICC line with    Skin:     General: Skin is warm and dry.      Capillary Refill: Capillary refill takes less than 2 seconds.   Neurological:      General: No focal deficit present.         Assessment:      HFrEF, here for RHC as part of work up for LVAD     Plan:    Patient seen and examined. No interval change since last H&P. Here for a RHC to assess her hemodynamics.   Access via the right IJ  7 Fr venous sheath  Risks and benefits of the procedure were explained to the patient. All questions were answered.  Consents were signed and in the chart.    Rupinder Kearney MD

## 2022-06-21 NOTE — Clinical Note
Is this patient a high probability for COVID-19?: Yes   Diagnosis: Chest pain [[id number]]   Admitting Provider:: LISA REID [[id number]]   Future Attending Provider: LISA REID [[id number]]   Reason for IP Medical Treatment  (Clinical interventions that can only be accomplished in the IP setting? ) :: chf   Estimated Length of Stay:: 2 midnights   I certify that Inpatient services for greater than or equal to 2 midnights are medically necessary:: Yes   Plans for Post-Acute care--if anticipated (pick the single best option):: A. No post acute care anticipated at this time

## 2022-06-22 NOTE — NURSING TRANSFER
Nursing Transfer Note      6/22/2022     Reason patient is being transferred: eval    Transfer To: room 3092    Transfer via wheelchair    Transfer with cardiac monitoring    Transported by transportation team    Medicines sent: on dobutamine gtt 2.5 mcg/kg/min    Any special needs or follow-up needed: dobutamine gtt    Chart send with patient: Yes    Notified: friend    Patient reassessed at: 0740 06/22/2022    Upon arrival to floor: cardiac monitor applied, patient oriented to room, call bell in reach and bed in lowest position  Pt is on dobutamine gtt 2.5 mcg/kg/min.

## 2022-06-22 NOTE — ASSESSMENT & PLAN NOTE
-continue home GDMT  -given difficult to ascertain volume status, consider repeat RHC with leave in PA to wean dobutamine and optimize GDMT  -formal TTE

## 2022-06-22 NOTE — PROGRESS NOTES
Pt's PICC dressing changed per protocol using kit and sterile technique. Pt's PICC  line site is clean, dry, intact with no drainage or signs of infection; Pt tolerated dressing change well. Next dressing change due 06/29/2022.

## 2022-06-22 NOTE — ED TRIAGE NOTES
Pt c/o intermittent substernal non-radiating chest tightness, SOB worse than baseline, and generalzied weakness/fatigue. Also reports +covid exposure to girlfriend who is positive currently. Pt is on home dobutamine infusion through PICC line. Denies abdominal pain, n/v/d, or fever.

## 2022-06-22 NOTE — H&P
[facility] - Emergency Dept  Cardiology  History and Physical     Patient Name: Audrey Oneil Jr.  MRN: [id number]  Admission Date: 6/21/2022  Code Status: Prior   Attending Provider: Tarah Marie MD   Primary Care Physician: Primary Doctor No  Principal Problem:<principal problem not specified>    Patient information was obtained from patient and ER records.     Subjective:         HPI:  Audrey Oneil Jr. is a 72 y.o. male. with ICM (EF 15%) on home  2.5 (previously refused LVAD, but now amenable), s/p [facility] CRT-D 5/2019, history of VT, HTN, HLD, CAD s/p MI who  presents to ED for dizziness and fatigue past two days.  In ED work up unremarkable from infectious standpoint including afebrile with out white count. In terms of volume status, overall slightly hypervolemic per my exam with JVD 4cm above clavicle, decreased bibasilar breath sounds and extended abdomen. CXR notable for new small bilateral pleural effusions and interval worsening of pulmonary congestion. Of note, he is prescribed lasix 40mg prn and has stated that past two days he has felt more LE edema and has been taking BID. Last RHC last week with elevated left sided filling pressures (PCWP 25) and mildly elevated right sided pressures. His entresto was increased to 49-51 mg BID with plans for eventual discontinuation of dobutamine. At home he also takes Jardiance 25mg, aldactone 25, and lasix 40 prn. Of note, he has had  issues of optimization of GDMT 2/2 hypotension and  has had two admissions in past couple of months for symptomatic hypotension requiring downtitration of meds.     Right Heart Pressures  RA: 10/ 7/ 8 RV: 40/ 11 PA: 40/ 25/ 30 PWP: 25/ 22/ 23 .   Cardiac output was 5.2  by Johnna. Cardiac index is 2.5 L/min/m2.   O2 Sat: PA 66%.   Pulmonary vascular resistance: 108. Systemic vascular resistance: 1300.   /77 (93); 97%  Wedge proven with fluoroscopy  On  2.5 mcg/kg/min      CPX  · Severe functional impairment associated  with a normal breathing reserve, normal oxygen stauration, poor effort. These findings are indicative of functional impairment secondary to poor effort.  · There were no arrhythmias during stress.  · There was no ST segment deviation noted during stress.  · The patient's exercise capacity was severely impaired.  · The test was stopped because the patient experienced fatigue.    TTE 5/29/22  · The left ventricle is normal in size with eccentric hypertrophy and severely decreased systolic function.  · The estimated ejection fraction is 10-15%.  · Grade I left ventricular diastolic dysfunction.  · Normal right ventricular size with normal right ventricular systolic function.  · Normal central venous pressure (3 mmHg).        Past Medical History:   Diagnosis Date    Acute hypoxemic respiratory failure 11/7/2015    Acute idiopathic gout of left knee 12/2/2019    Acute idiopathic gout of right elbow 9/23/2021    AICD (automatic cardioverter/defibrillator) present 12/13/2015      Anticoagulant long-term use     Bronchopneumonia 12/20/2021    CHF (congestive heart failure)     Chronic anticoagulation 5/5/2016    Chronic combined systolic and diastolic heart failure 11/26/2012    EF 10-20% on ECHO 2013    Chronic gout 12/2/2019    Clotting disorder     Coronary artery disease involving native coronary artery of native heart without angina pectoris 11/26/2012    Cath 10- Stents patent non-obstructive disease Cath 11-12015 non-obstructive disease     COVID-19 08/2021    Had antibody infusion    Diverticulosis of colon     DVT (deep venous thrombosis), unspecified laterality 11/12/2015    Dysphonia 1/28/2018    Essential hypertension 11/15/2015    Fine motor impairment 2/2/2021    Hyperlipidemia     Hypertensive heart disease with heart failure 5/5/2016    MI (myocardial infarction) 2009    x's 5    Nicotine abuse     Obesity 11/26/2012    Olecranon bursitis of right elbow 9/19/2021     Pulmonary embolism 2011    Renal disorder     LAVERNE    Right carpal tunnel syndrome 4/6/2018    Stented coronary artery 11/26/2012    LAD stent placed 10/17/2007     Trigger thumb of right hand 4/6/2018       Past Surgical History:   Procedure Laterality Date    APPENDECTOMY      BACK SURGERY      CARDIAC DEFIBRILLATOR PLACEMENT      CARDIAC SURGERY  2007    stent    CARPAL TUNNEL RELEASE Right 04/2018    INSERTION OF BIVENTRICULAR IMPLANTABLE CARDIOVERTER-DEFIBRILLATOR (ICD) N/A 5/3/2019    Procedure: INSERTION, ICD, BIVENTRICULAR;  Surgeon: Teofilo Pal MD;  Location: [facility] EP LAB;  Service: Cardiology;  Laterality: N/A;  ICH CM,  ICD UPGD BI-V,  SJM, MAC, FAS, 3PREP (dual ICD INSITU)    r knee scope      REVISION OF SKIN POCKET FOR CARDIOVERTER-DEFIBRILLATOR Left 5/3/2019    Procedure: Revision, Skin Pocket, For Cardioverter-Defibrillator;  Surgeon: Teofilo Pal MD;  Location: [facility] EP LAB;  Service: Cardiology;  Laterality: Left;    RIGHT HEART CATHETERIZATION Right 6/14/2021    Procedure: INSERTION, CATHETER, RIGHT HEART;  Surgeon: Lizz Nieto MD;  Location: [facility] CATH LAB;  Service: Cardiology;  Laterality: Right;    RIGHT HEART CATHETERIZATION Right 6/17/2022    Procedure: INSERTION, CATHETER, RIGHT HEART;  Surgeon: Migue Henry Jr., MD;  Location: [facility] CATH LAB;  Service: Cardiology;  Laterality: Right;    SPINE SURGERY      TONSILLECTOMY      TRIGGER FINGER RELEASE Right 04/2018    thumb       Review of patient's allergies indicates:   Allergen Reactions    Iodine containing multivitamin Swelling     itching    Keflex [cephalexin] Swelling     Eyes.  Tolerated multiple doses of zosyn and 1 dose of augmentin in 2015 and 2016, respectively    Peaches [peach (prunus persica)] Swelling     eyes    Shellfish containing products Swelling    Fig tree Swelling     itching    Tuberculin spenser test ppd Rash       No current facility-administered medications on file prior to  encounter.     Current Outpatient Medications on File Prior to Encounter   Medication Sig    allopurinoL (ZYLOPRIM) 100 MG tablet Take 2 tablets (200 mg total) by mouth once daily.    amiodarone (PACERONE) 200 MG Tab TAKE 1 AND 1/2 TABLETS(300 MG) BY MOUTH EVERY DAY    aspirin (ECOTRIN) 81 MG EC tablet Take 1 tablet (81 mg total) by mouth once daily.    atorvastatin (LIPITOR) 80 MG tablet Take 1 tablet (80 mg total) by mouth once daily.    diphenhydrAMINE (BENADRYL) 25 mg capsule Take 25 mg by mouth as needed for Allergies.    DOBUTamine (DOBUTREX) 500 mg/250 mL (2,000 mcg/mL) 2.5 mcg/kg/min  Patient is 95.7 kg    empagliflozin (JARDIANCE) 10 mg tablet Take 1 tablet (10 mg total) by mouth once daily.    furosemide (LASIX) 40 MG tablet Take 1 tablet (40 mg total) by mouth daily as needed (Weight gain of 3 lbs in one day or 5 lbs in one week).    ondansetron (ZOFRAN-ODT) 4 MG TbDL Dissolve 1 tablet (4 mg total) by mouth every 6 (six) hours as needed (nausea).    OPW TEST CLAIM - DO NOT FILL OPW test claim. Do not fill.    oxyCODONE-acetaminophen (PERCOCET) 5-325 mg per tablet Take 1 tablet by mouth every 6 (six) hours as needed for Pain.    sacubitriL-valsartan (ENTRESTO) 49-51 mg per tablet Take 1 tablet by mouth 2 (two) times daily.    simethicone (MYLICON) 80 MG chewable tablet Take 1 tablet (80 mg total) by mouth every 6 (six) hours as needed for Flatulence.    spironolactone (ALDACTONE) 25 MG tablet Take 1 tablet (25 mg total) by mouth once daily.    [DISCONTINUED] clopidogreL (PLAVIX) 75 mg tablet Take 1 tablet (75 mg total) by mouth once daily. (Patient not taking: Reported on 3/18/2022)    [DISCONTINUED] colchicine (COLCRYS) 0.6 mg tablet Take 1 tablet (0.6 mg total) by mouth once daily. (Patient not taking: Reported on 3/18/2022)    [DISCONTINUED] ipratropium (ATROVENT) 0.02 % nebulizer solution Take 2.5 mLs (500 mcg total) by nebulization 4 (four) times daily as needed for Wheezing. Rescue  (Patient not taking: Reported on 3/29/2022)    [DISCONTINUED] metoprolol succinate (TOPROL-XL) 25 MG 24 hr tablet Take 1 tablet (25 mg total) by mouth once daily.    [DISCONTINUED] midodrine (PROAMATINE) 2.5 MG Tab Take 1 tablet (2.5 mg total) by mouth 3 (three) times daily. HOLD until follow up with cardiology     Family History       Problem Relation (Age of Onset)    Cancer Mother, Paternal Grandmother    Colon polyps Father    Diabetes Mother    Heart disease Mother, Father    No Known Problems Sister, Daughter, Son, Sister, Son    Stroke Father          Tobacco Use    Smoking status: Former Smoker     Packs/day: 1.00     Years: 45.00     Pack years: 45.00     Types: Cigarettes     Quit date: 2005     Years since quittin.5    Smokeless tobacco: Never Used    Tobacco comment: 1-1.5 ppd every day.   Substance and Sexual Activity    Alcohol use: No    Drug use: No    Sexual activity: Not Currently     ROS see HPI   Objective:     Vital Signs (Most Recent):  Temp: 97.8 °F (36.6 °C) (22 2331)  Pulse: 82 (22 0100)  Resp: 20 (22 0100)  BP: (!) 115/54 (22 0100)  SpO2: 96 % (22 010)   Vital Signs (24h Range):  Temp:  [97.8 °F (36.6 °C)] 97.8 °F (36.6 °C)  Pulse:  [82-87] 82  Resp:  [18-20] 20  SpO2:  [96 %-98 %] 96 %  BP: (110-115)/(54-66) 115/54     Weight: 96.2 kg (212 lb)  Body mass index is 33.2 kg/m².    SpO2: 96 %  O2 Device (Oxygen Therapy): room air    No intake or output data in the 24 hours ending 22 0154    Lines/Drains/Airways       Peripherally Inserted Central Catheter Line  Duration             PICC Double Lumen right basilic -- days              Peripheral Intravenous Line  Duration                  Peripheral IV - Single Lumen 22 0102 22 G Right Wrist <1 day                    Physical Exam    Significant Labs: All pertinent lab results from the last 24 hours have been reviewed.    Significant Imaging: CT scan: CT ABDOMEN PELVIS WITH CONTRAST:  No results found for this visit on 06/21/22. and CT ABDOMEN PELVIS WITHOUT CONTRAST: No results found for this visit on 06/21/22. and Echocardiogram: 2D echo with color flow doppler:   Results for orders placed or performed during the hospital encounter of 04/27/18   2D echo with color flow doppler   Result Value Ref Range    EF + QEF 20 (A) 55 - 65    Mitral Valve Regurgitation TRIVIAL     Diastolic Dysfunction Yes (A)     Est. PA Systolic Pressure 25.28     Tricuspid Valve Regurgitation TRIVIAL TO MILD     Narrative    Date of Procedure: 04/27/2018        TEST DESCRIPTION   Technical Quality: This study was performed in conjunction with a 3ml intravenous injection of Optison contrast agent.     General: A catheter is present in the right-sided cardiac chambers.     Aorta: The aortic root is normal in size, measuring 3.2 cm at sinotubular junction and 3.5 cm at Sinuses of Valsalva. The proximal ascending aorta is normal in size, measuring 3.4 cm across.     Left Atrium: The left atrial volume index is mildly enlarged, measuring 36.22 cc/m2.     Left Ventricle: The left ventricle is upper limit of normal, with an end-diastolic diameter of 5.4 cm, and an end-systolic diameter of 4.8 cm. LV wall thickness is normal, with the septum measuring 0.8 cm and the posterior wall measuring 0.9 cm across.   Relative wall thickness was normal at 0.33, and the LV mass index was 89.8 g/m2 consistent with normal left ventricular mass. The apex is severely hypokinetic.   The following segments were dyskinetic: apical inferior wall.  The following segments were akinetic: mid anteroseptum, mid inferoseptum, basal inferior wall, apical anterior wall.  The following segments were severely hypokinetic: apical septum, apical lateral wall, mid anterolateral wall, mid inferior wall, mid anterior wall.  There is global hypokinesis. Left ventricular systolic function appears severely depressed. Visually estimated ejection fraction is upper  teens to low 20s. The LV Doppler derived stroke volume equals 73.0 ccs.     Diastolic indices: E wave velocity 0.5 m/s, E/A ratio 0.6,  msec., E/e' ratio(avg) 9. Pulmonary vein systolic/diastolic ratio is normal. Mean left atrial pressures are normal. There is diastolic dysfunction secondary to relaxation abnormality.     Right Atrium: The right atrium is normal in size, measuring 4.7 cm in length and 4.0 cm in width in the apical view.     Right Ventricle: The right ventricle is normal in size measuring 3.6 cm at the base in the apical right ventricle-focused view. Global right ventricular systolic function appears normal. There is abnormal septal motion. Tricuspid annular plane systolic   excursion (TAPSE) is 2.3 cm. Tissue Doppler-derived tricuspid annular peak systolic velocity (S prime) is 11.1 cm/s. The estimated PA systolic pressure is 25 mmHg.     Aortic Valve:  The peak velocity obtained across the aortic valve is 1.6 m/s, which translates to a peak gradient of 10 mmHg. The mean gradient is 4 mmHg. Using a left ventricular outflow tract diameter of 2.4 cm, a left ventricular outflow tract   velocity time integral of 17 cm, and a peak instantaneous transvalvular velocity time integral of 27 cm, the calculated aortic valve area is 2.75 cm2(AVAi is 1.27 cm2/m2). There is aortic annular calcification. Aortic valve is normal in structure with   normal leaflet mobility.     Mitral Valve:  There is trivial mitral regurgitation. Mitral valve is normal in structure with normal leaflet mobility.     Tricuspid Valve:  There is trivial to mild tricuspid regurgitation. Tricuspid valve is normal in structure with normal leaflet mobility.     Pulmonary Valve:  Pulmonary valve is normal in structure with normal leaflet mobility.     IVC: IVC is normal in size and collapses > 50% with a sniff, suggesting normal right atrial pressure of 3 mmHg.     Intracavitary: There is no evidence of pericardial effusion,  intracavity mass, thrombi, or vegetation.     The Blue Index was 53.1 msec, suggesting severe left ventricular dyssynchrony.    CONCLUSIONS     1 - Upper limit of normal left ventricular enlargement.     2 - Severely depressed left ventricular systolic function (EF upper teens to low 20s).     3 - Mild left atrial enlargement.     4 - Impaired LV relaxation, normal LAP (grade 1 diastolic dysfunction).     5 - Normal right ventricular systolic function .     6 - Trivial mitral regurgitation.     7 - Trivial to mild tricuspid regurgitation.     8 - The estimated PA systolic pressure is 25 mmHg.     9 - TDI as per text.             This document has been electronically    SIGNED BY: Edilberto Dixon MD On: 04/30/2018 10:50    This document was originally electronically signed on: 04/27/2018 17:03    and Transthoracic echo (TTE) complete (Cupid Only):   Results for orders placed or performed during the hospital encounter of 05/28/22   Echo   Result Value Ref Range    Ascending aorta 3.40 cm    STJ 2.68 cm    AV mean gradient 5 mmHg    Ao peak russel 1.45 m/s    Ao VTI 21.44 cm    IVRT 110.37 msec    IVS 0.71 0.6 - 1.1 cm    LA size 3.32 cm    Left Atrium Major Axis 4.87 cm    Left Atrium Minor Axis 5.84 cm    LVIDd 7.80 (A) 3.5 - 6.0 cm    LVIDs 6.61 (A) 2.1 - 4.0 cm    LVOT diameter 2.01 cm    LVOT peak VTI 21.11 cm    Posterior Wall 0.80 0.6 - 1.1 cm    MV Peak A Russel 0.91 m/s    E wave deceleration time 91.35 msec    MV Peak E Russel 0.49 m/s    RA Major Axis 4.20 cm    RA Width 3.13 cm    RVDD 3.20 cm    Sinus 2.94 cm    TAPSE 1.60 cm    TDI LATERAL 0.09 m/s    TDI SEPTAL 0.03 m/s    LA WIDTH 3.28 cm    MV stenosis pressure 1/2 time 26.49 ms    LV Diastolic Volume 312.71 mL    LV Systolic Volume 224.55 mL    RV S' 12.92 cm/s    LVOT peak russel 1.14 m/s    LA volume (mod) 43.52 cm3    LV LATERAL E/E' RATIO 5.44 m/s    LV SEPTAL E/E' RATIO 16.33 m/s    FS 15 %    LA volume 49.16 cm3    LV mass 277.16 g    Left Ventricle Relative Wall  Thickness 0.21 cm    AV valve area 3.12 cm2    AV Velocity Ratio 0.79     AV index (prosthetic) 0.98     MV valve area p 1/2 method 8.31 cm2    E/A ratio 0.54     Mean e' 0.06 m/s    LVOT area 3.2 cm2    LVOT stroke volume 66.95 cm3    AV peak gradient 8 mmHg    E/E' ratio 8.17 m/s    LV Systolic Volume Index 108.0 mL/m2    LV Diastolic Volume Index 150.34 mL/m2    LA Volume Index 23.6 mL/m2    LV Mass Index 133 g/m2    LA Volume Index (Mod) 20.9 mL/m2    BSA 2.14 m2    Right Atrial Pressure (from IVC) 3 mmHg    EF 10 %    Narrative    · The left ventricle is normal in size with eccentric hypertrophy and   severely decreased systolic function.  · The estimated ejection fraction is 10-15%.  · Grade I left ventricular diastolic dysfunction.  · Normal right ventricular size with normal right ventricular systolic   function.  · Normal central venous pressure (3 mmHg).        Assessment and Plan:     Cardiomyopathy, ischemic  -continue home GDMT for now including entresto 49-51 mg BID + Jardiance 25mg+ aldactone 25+ Amiodarone 300 mg daily  -close monitoring of renal function  -repeat HDs, pending LA and sv02 , cvpm once resulted will admit to HTS floor vs ICU  -hold home oral lasix 40 , begin IV lasix 80mg X1 with further adjustments pending HDs  -continue home dobutamine 2.5 mg daily via PICC  -PICC consult as line has not pulled back since insertion  -pharm to evaluate dobutamine pump history to ensure proper medication delivery given above issues with PICC  -TTE, pro BNP  -consider repeat RHC with leave in PA to wean dobutamine and optimize GDMT once euvolemic    Stage 3a chronic kidney disease  -baseline around 1.5- 1.7, bumped to 1.9 however entresto recently increased  -UA and renal US      Coronary artery disease involving native coronary artery of native heart without angina pectoris  -C with patent RIOS and non obstructive disease  -continue home meds: statin + ASA 81 mg          VTE Risk Mitigation (From  admission, onward)    None          Abeba Landon MD  Cardiology   [facility] - Emergency Dept

## 2022-06-22 NOTE — SUBJECTIVE & OBJECTIVE
Past Medical History:   Diagnosis Date    Acute hypoxemic respiratory failure 11/7/2015    Acute idiopathic gout of left knee 12/2/2019    Acute idiopathic gout of right elbow 9/23/2021    AICD (automatic cardioverter/defibrillator) present 12/13/2015      Anticoagulant long-term use     Bronchopneumonia 12/20/2021    CHF (congestive heart failure)     Chronic anticoagulation 5/5/2016    Chronic combined systolic and diastolic heart failure 11/26/2012    EF 10-20% on ECHO 2013    Chronic gout 12/2/2019    Clotting disorder     Coronary artery disease involving native coronary artery of native heart without angina pectoris 11/26/2012    Cath 10- Stents patent non-obstructive disease Cath 11-12015 non-obstructive disease     COVID-19 08/2021    Had antibody infusion    Diverticulosis of colon     DVT (deep venous thrombosis), unspecified laterality 11/12/2015    Dysphonia 1/28/2018    Essential hypertension 11/15/2015    Fine motor impairment 2/2/2021    Hyperlipidemia     Hypertensive heart disease with heart failure 5/5/2016    MI (myocardial infarction) 2009    x's 5    Nicotine abuse     Obesity 11/26/2012    Olecranon bursitis of right elbow 9/19/2021    Pulmonary embolism 2011    Renal disorder     LAVERNE    Right carpal tunnel syndrome 4/6/2018    Stented coronary artery 11/26/2012    LAD stent placed 10/17/2007     Trigger thumb of right hand 4/6/2018       Past Surgical History:   Procedure Laterality Date    APPENDECTOMY      BACK SURGERY      CARDIAC DEFIBRILLATOR PLACEMENT      CARDIAC SURGERY  2007    stent    CARPAL TUNNEL RELEASE Right 04/2018    INSERTION OF BIVENTRICULAR IMPLANTABLE CARDIOVERTER-DEFIBRILLATOR (ICD) N/A 5/3/2019    Procedure: INSERTION, ICD, BIVENTRICULAR;  Surgeon: Teofilo Pal MD;  Location: [facility];  Service: Cardiology;  Laterality: N/A;  ICH CM,  ICD UPGD BI-V,  SJM, MAC, FAS, 3PREP (dual ICD INSITU)    r knee scope      REVISION OF SKIN POCKET FOR  CARDIOVERTER-DEFIBRILLATOR Left 5/3/2019    Procedure: Revision, Skin Pocket, For Cardioverter-Defibrillator;  Surgeon: Teofilo Pal MD;  Location: [facility] EP LAB;  Service: Cardiology;  Laterality: Left;    RIGHT HEART CATHETERIZATION Right 6/14/2021    Procedure: INSERTION, CATHETER, RIGHT HEART;  Surgeon: Lizz Nieto MD;  Location: [facility] CATH LAB;  Service: Cardiology;  Laterality: Right;    RIGHT HEART CATHETERIZATION Right 6/17/2022    Procedure: INSERTION, CATHETER, RIGHT HEART;  Surgeon: Migue Henry Jr., MD;  Location: [facility] CATH LAB;  Service: Cardiology;  Laterality: Right;    SPINE SURGERY      TONSILLECTOMY      TRIGGER FINGER RELEASE Right 04/2018    thumb       Review of patient's allergies indicates:   Allergen Reactions    Iodine containing multivitamin Swelling     itching    Keflex [cephalexin] Swelling     Eyes.  Tolerated multiple doses of zosyn and 1 dose of augmentin in 2015 and 2016, respectively    Peaches [peach (prunus persica)] Swelling     eyes    Shellfish containing products Swelling    Fig tree Swelling     itching    Tuberculin spenser test ppd Rash       No current facility-administered medications on file prior to encounter.     Current Outpatient Medications on File Prior to Encounter   Medication Sig    allopurinoL (ZYLOPRIM) 100 MG tablet Take 2 tablets (200 mg total) by mouth once daily.    amiodarone (PACERONE) 200 MG Tab TAKE 1 AND 1/2 TABLETS(300 MG) BY MOUTH EVERY DAY    aspirin (ECOTRIN) 81 MG EC tablet Take 1 tablet (81 mg total) by mouth once daily.    atorvastatin (LIPITOR) 80 MG tablet Take 1 tablet (80 mg total) by mouth once daily.    diphenhydrAMINE (BENADRYL) 25 mg capsule Take 25 mg by mouth as needed for Allergies.    DOBUTamine (DOBUTREX) 500 mg/250 mL (2,000 mcg/mL) 2.5 mcg/kg/min  Patient is 95.7 kg    empagliflozin (JARDIANCE) 10 mg tablet Take 1 tablet (10 mg total) by mouth once daily.    furosemide (LASIX) 40 MG tablet Take 1 tablet (40 mg total)  by mouth daily as needed (Weight gain of 3 lbs in one day or 5 lbs in one week).    ondansetron (ZOFRAN-ODT) 4 MG TbDL Dissolve 1 tablet (4 mg total) by mouth every 6 (six) hours as needed (nausea).    OPW TEST CLAIM - DO NOT FILL OPW test claim. Do not fill.    oxyCODONE-acetaminophen (PERCOCET) 5-325 mg per tablet Take 1 tablet by mouth every 6 (six) hours as needed for Pain.    sacubitriL-valsartan (ENTRESTO) 49-51 mg per tablet Take 1 tablet by mouth 2 (two) times daily.    simethicone (MYLICON) 80 MG chewable tablet Take 1 tablet (80 mg total) by mouth every 6 (six) hours as needed for Flatulence.    spironolactone (ALDACTONE) 25 MG tablet Take 1 tablet (25 mg total) by mouth once daily.    [DISCONTINUED] clopidogreL (PLAVIX) 75 mg tablet Take 1 tablet (75 mg total) by mouth once daily. (Patient not taking: Reported on 3/18/2022)    [DISCONTINUED] colchicine (COLCRYS) 0.6 mg tablet Take 1 tablet (0.6 mg total) by mouth once daily. (Patient not taking: Reported on 3/18/2022)    [DISCONTINUED] ipratropium (ATROVENT) 0.02 % nebulizer solution Take 2.5 mLs (500 mcg total) by nebulization 4 (four) times daily as needed for Wheezing. Rescue (Patient not taking: Reported on 3/29/2022)    [DISCONTINUED] metoprolol succinate (TOPROL-XL) 25 MG 24 hr tablet Take 1 tablet (25 mg total) by mouth once daily.    [DISCONTINUED] midodrine (PROAMATINE) 2.5 MG Tab Take 1 tablet (2.5 mg total) by mouth 3 (three) times daily. HOLD until follow up with cardiology     Family History       Problem Relation (Age of Onset)    Cancer Mother, Paternal Grandmother    Colon polyps Father    Diabetes Mother    Heart disease Mother, Father    No Known Problems Sister, Daughter, Son, Sister, Son    Stroke Father          Tobacco Use    Smoking status: Former Smoker     Packs/day: 1.00     Years: 45.00     Pack years: 45.00     Types: Cigarettes     Quit date: 2005     Years since quittin.5    Smokeless tobacco: Never Used    Tobacco  comment: 1-1.5 ppd every day.   Substance and Sexual Activity    Alcohol use: No    Drug use: No    Sexual activity: Not Currently     ROS see HPI   Objective:     Vital Signs (Most Recent):  Temp: 97.8 °F (36.6 °C) (06/21/22 2331)  Pulse: 82 (06/22/22 0100)  Resp: 20 (06/22/22 0100)  BP: (!) 115/54 (06/22/22 0100)  SpO2: 96 % (06/22/22 0100)   Vital Signs (24h Range):  Temp:  [97.8 °F (36.6 °C)] 97.8 °F (36.6 °C)  Pulse:  [82-87] 82  Resp:  [18-20] 20  SpO2:  [96 %-98 %] 96 %  BP: (110-115)/(54-66) 115/54     Weight: 96.2 kg (212 lb)  Body mass index is 33.2 kg/m².    SpO2: 96 %  O2 Device (Oxygen Therapy): room air    No intake or output data in the 24 hours ending 06/22/22 0154    Lines/Drains/Airways       Peripherally Inserted Central Catheter Line  Duration             PICC Double Lumen right basilic -- days              Peripheral Intravenous Line  Duration                  Peripheral IV - Single Lumen 06/22/22 0102 22 G Right Wrist <1 day                    Physical Exam    Significant Labs: All pertinent lab results from the last 24 hours have been reviewed.    Significant Imaging: CT scan: CT ABDOMEN PELVIS WITH CONTRAST: No results found for this visit on 06/21/22. and CT ABDOMEN PELVIS WITHOUT CONTRAST: No results found for this visit on 06/21/22. and Echocardiogram: 2D echo with color flow doppler:   Results for orders placed or performed during the hospital encounter of 04/27/18   2D echo with color flow doppler   Result Value Ref Range    EF + QEF 20 (A) 55 - 65    Mitral Valve Regurgitation TRIVIAL     Diastolic Dysfunction Yes (A)     Est. PA Systolic Pressure 25.28     Tricuspid Valve Regurgitation TRIVIAL TO MILD     Narrative    Date of Procedure: 04/27/2018        TEST DESCRIPTION   Technical Quality: This study was performed in conjunction with a 3ml intravenous injection of Optison contrast agent.     General: A catheter is present in the right-sided cardiac chambers.     Aorta: The aortic  root is normal in size, measuring 3.2 cm at sinotubular junction and 3.5 cm at Sinuses of Valsalva. The proximal ascending aorta is normal in size, measuring 3.4 cm across.     Left Atrium: The left atrial volume index is mildly enlarged, measuring 36.22 cc/m2.     Left Ventricle: The left ventricle is upper limit of normal, with an end-diastolic diameter of 5.4 cm, and an end-systolic diameter of 4.8 cm. LV wall thickness is normal, with the septum measuring 0.8 cm and the posterior wall measuring 0.9 cm across.   Relative wall thickness was normal at 0.33, and the LV mass index was 89.8 g/m2 consistent with normal left ventricular mass. The apex is severely hypokinetic.   The following segments were dyskinetic: apical inferior wall.  The following segments were akinetic: mid anteroseptum, mid inferoseptum, basal inferior wall, apical anterior wall.  The following segments were severely hypokinetic: apical septum, apical lateral wall, mid anterolateral wall, mid inferior wall, mid anterior wall.  There is global hypokinesis. Left ventricular systolic function appears severely depressed. Visually estimated ejection fraction is upper teens to low 20s. The LV Doppler derived stroke volume equals 73.0 ccs.     Diastolic indices: E wave velocity 0.5 m/s, E/A ratio 0.6,  msec., E/e' ratio(avg) 9. Pulmonary vein systolic/diastolic ratio is normal. Mean left atrial pressures are normal. There is diastolic dysfunction secondary to relaxation abnormality.     Right Atrium: The right atrium is normal in size, measuring 4.7 cm in length and 4.0 cm in width in the apical view.     Right Ventricle: The right ventricle is normal in size measuring 3.6 cm at the base in the apical right ventricle-focused view. Global right ventricular systolic function appears normal. There is abnormal septal motion. Tricuspid annular plane systolic   excursion (TAPSE) is 2.3 cm. Tissue Doppler-derived tricuspid annular peak systolic velocity  (S prime) is 11.1 cm/s. The estimated PA systolic pressure is 25 mmHg.     Aortic Valve:  The peak velocity obtained across the aortic valve is 1.6 m/s, which translates to a peak gradient of 10 mmHg. The mean gradient is 4 mmHg. Using a left ventricular outflow tract diameter of 2.4 cm, a left ventricular outflow tract   velocity time integral of 17 cm, and a peak instantaneous transvalvular velocity time integral of 27 cm, the calculated aortic valve area is 2.75 cm2(AVAi is 1.27 cm2/m2). There is aortic annular calcification. Aortic valve is normal in structure with   normal leaflet mobility.     Mitral Valve:  There is trivial mitral regurgitation. Mitral valve is normal in structure with normal leaflet mobility.     Tricuspid Valve:  There is trivial to mild tricuspid regurgitation. Tricuspid valve is normal in structure with normal leaflet mobility.     Pulmonary Valve:  Pulmonary valve is normal in structure with normal leaflet mobility.     IVC: IVC is normal in size and collapses > 50% with a sniff, suggesting normal right atrial pressure of 3 mmHg.     Intracavitary: There is no evidence of pericardial effusion, intracavity mass, thrombi, or vegetation.     The Blue Index was 53.1 msec, suggesting severe left ventricular dyssynchrony.    CONCLUSIONS     1 - Upper limit of normal left ventricular enlargement.     2 - Severely depressed left ventricular systolic function (EF upper teens to low 20s).     3 - Mild left atrial enlargement.     4 - Impaired LV relaxation, normal LAP (grade 1 diastolic dysfunction).     5 - Normal right ventricular systolic function .     6 - Trivial mitral regurgitation.     7 - Trivial to mild tricuspid regurgitation.     8 - The estimated PA systolic pressure is 25 mmHg.     9 - TDI as per text.             This document has been electronically    SIGNED BY: Edilberto Dixon MD On: 04/30/2018 10:50    This document was originally electronically signed on: 04/27/2018 17:03    and  Transthoracic echo (TTE) complete (Cupid Only):   Results for orders placed or performed during the hospital encounter of 05/28/22   Echo   Result Value Ref Range    Ascending aorta 3.40 cm    STJ 2.68 cm    AV mean gradient 5 mmHg    Ao peak russel 1.45 m/s    Ao VTI 21.44 cm    IVRT 110.37 msec    IVS 0.71 0.6 - 1.1 cm    LA size 3.32 cm    Left Atrium Major Axis 4.87 cm    Left Atrium Minor Axis 5.84 cm    LVIDd 7.80 (A) 3.5 - 6.0 cm    LVIDs 6.61 (A) 2.1 - 4.0 cm    LVOT diameter 2.01 cm    LVOT peak VTI 21.11 cm    Posterior Wall 0.80 0.6 - 1.1 cm    MV Peak A Russel 0.91 m/s    E wave deceleration time 91.35 msec    MV Peak E Russel 0.49 m/s    RA Major Axis 4.20 cm    RA Width 3.13 cm    RVDD 3.20 cm    Sinus 2.94 cm    TAPSE 1.60 cm    TDI LATERAL 0.09 m/s    TDI SEPTAL 0.03 m/s    LA WIDTH 3.28 cm    MV stenosis pressure 1/2 time 26.49 ms    LV Diastolic Volume 312.71 mL    LV Systolic Volume 224.55 mL    RV S' 12.92 cm/s    LVOT peak russel 1.14 m/s    LA volume (mod) 43.52 cm3    LV LATERAL E/E' RATIO 5.44 m/s    LV SEPTAL E/E' RATIO 16.33 m/s    FS 15 %    LA volume 49.16 cm3    LV mass 277.16 g    Left Ventricle Relative Wall Thickness 0.21 cm    AV valve area 3.12 cm2    AV Velocity Ratio 0.79     AV index (prosthetic) 0.98     MV valve area p 1/2 method 8.31 cm2    E/A ratio 0.54     Mean e' 0.06 m/s    LVOT area 3.2 cm2    LVOT stroke volume 66.95 cm3    AV peak gradient 8 mmHg    E/E' ratio 8.17 m/s    LV Systolic Volume Index 108.0 mL/m2    LV Diastolic Volume Index 150.34 mL/m2    LA Volume Index 23.6 mL/m2    LV Mass Index 133 g/m2    LA Volume Index (Mod) 20.9 mL/m2    BSA 2.14 m2    Right Atrial Pressure (from IVC) 3 mmHg    EF 10 %    Narrative    · The left ventricle is normal in size with eccentric hypertrophy and   severely decreased systolic function.  · The estimated ejection fraction is 10-15%.  · Grade I left ventricular diastolic dysfunction.  · Normal right ventricular size with normal right  ventricular systolic   function.  · Normal central venous pressure (3 mmHg).

## 2022-06-22 NOTE — PLAN OF CARE
Problem: Occupational Therapy  Goal: Occupational Therapy Goal  Description: Occupational therapy goals not needed at this time.  Outcome: Met     Eval/discharge; pt does not require skilled OT services at this time. Recommendation is for home (no further OT needs). PT to follow. Please re-consult if status changes. Thank you for the consult.

## 2022-06-22 NOTE — ASSESSMENT & PLAN NOTE
-baseline around 1.5- 1.7, bumped to 1.9 however entresto recently increased  -, fup in AM, if worsen would consider UA and renal US

## 2022-06-22 NOTE — PROGRESS NOTES
Heart Transplant  attempted to see pt in order to assess needs given admission.   attempted to see the pt twice this AM, however the pt was sleeping.   [facility] Transplant Social Workers will follow to complete assessment.

## 2022-06-22 NOTE — PT/OT/SLP EVAL
Physical Therapy  Evaluation and Treatment    Audrey Oneil Jr.   [id number]    Time Tracking:     PT Received On: 06/22/22   PT Start Time: 1118   PT Stop Time: 1140   PT Total Time (min): 22 min    Billable Minutes: Evaluation 1 procedure and Gait Training 9 minutes      Recommendations:     Discharge recommendations: Home with family     Equipment recommendations: None    Barriers to Discharge: None    Patient Information:     Recent Surgery: * No surgery found *      Diagnosis: Coronary artery disease    Length of Stay: 0 days    General Precautions: Standard, fall  Orthopedic Precautions: None  Brace: None    Assessment:     Audrey Oneil Jr. is a 72 y.o. male admitted to [facility] [place] on 6/21/2022 for coronary artery disease. Audrey Oneil Jr. tolerated evaluation well today. He was resting in bed with no family/friends present upon PT/OT entry to room, agreeable to evaluation. Reports he's been more easily fatigued past few weeks, difficulty walking > 1/2 block at home. He demonstrates ability to transition in/out of bed independently using his UE for support. Ambulates 400 ft in hallways (wearing mask) on portable monitor (telemetry) and PICC line infusing. Difficulty holding conversation towards end, audible SOB noted. HR 89 bpm, pulse ox 100% on room air at end of ambulation trial. Discussed PT role, POC, goals and recommendations (Home with family, no DME needs) with patient; verbalized understanding. Audrey Oneil Jr. would benefit from acute PT services to promote mobility during this admission and improve return to PLOF.    Problem List: weakness, decreased endurance, impaired mobility, gait instability and impaired cardiopulmonary response to activity    Rehab Prognosis: Good; patient would benefit from acute skilled PT services to address these deficits and reach maximum level of function.    Plan:     Patient to be seen 2 x/week to address the above listed problems via gait training, therapeutic  "activities, therapeutic exercises    Plan of Care Expires: 07/22/22  Plan of Care reviewed with: patient    Subjective:     Communicated with RN prior to evaluation, appropriate to see for evaluation.    Pt found supine in bed (HOB elevated) upon PT entry to room, agreeable to evaluation.    Patient commenting: "I'm just so tired, I haven't gotten any rest since I've been in here."    Does this patient have any cultural, spiritual, Sabianist conflicts given the current situation? Patient has no barriers to learning. Patient verbalizes understanding of his/her program and goals and demonstrates them correctly. No cultural, spiritual, or educational needs identified.    Past Medical History:   Diagnosis Date    Acute hypoxemic respiratory failure 11/7/2015    Acute idiopathic gout of left knee 12/2/2019    Acute idiopathic gout of right elbow 9/23/2021    AICD (automatic cardioverter/defibrillator) present 12/13/2015      Anticoagulant long-term use     Bronchopneumonia 12/20/2021    CHF (congestive heart failure)     Chronic anticoagulation 5/5/2016    Chronic combined systolic and diastolic heart failure 11/26/2012    EF 10-20% on ECHO 2013    Chronic gout 12/2/2019    Clotting disorder     Coronary artery disease involving native coronary artery of native heart without angina pectoris 11/26/2012    Cath 10- Stents patent non-obstructive disease Cath 11-12015 non-obstructive disease     COVID-19 08/2021    Had antibody infusion    Diverticulosis of colon     DVT (deep venous thrombosis), unspecified laterality 11/12/2015    Dysphonia 1/28/2018    Essential hypertension 11/15/2015    Fine motor impairment 2/2/2021    Hyperlipidemia     Hypertensive heart disease with heart failure 5/5/2016    MI (myocardial infarction) 2009    x's 5    Nicotine abuse     Obesity 11/26/2012    Olecranon bursitis of right elbow 9/19/2021    Pulmonary embolism 2011    Renal disorder     LAVERNE    " Right carpal tunnel syndrome 4/6/2018    Stented coronary artery 11/26/2012    LAD stent placed 10/17/2007     Trigger thumb of right hand 4/6/2018      Past Surgical History:   Procedure Laterality Date    APPENDECTOMY      BACK SURGERY      CARDIAC DEFIBRILLATOR PLACEMENT      CARDIAC SURGERY  2007    stent    CARPAL TUNNEL RELEASE Right 04/2018    INSERTION OF BIVENTRICULAR IMPLANTABLE CARDIOVERTER-DEFIBRILLATOR (ICD) N/A 5/3/2019    Procedure: INSERTION, ICD, BIVENTRICULAR;  Surgeon: Teofilo Pal MD;  Location: [facility] EP LAB;  Service: Cardiology;  Laterality: N/A;  ICH CM,  ICD UPGD BI-V,  SJM, MAC, FAS, 3PREP (dual ICD INSITU)    r knee scope      REVISION OF SKIN POCKET FOR CARDIOVERTER-DEFIBRILLATOR Left 5/3/2019    Procedure: Revision, Skin Pocket, For Cardioverter-Defibrillator;  Surgeon: Teofilo Pal MD;  Location: [facility] EP LAB;  Service: Cardiology;  Laterality: Left;    RIGHT HEART CATHETERIZATION Right 6/14/2021    Procedure: INSERTION, CATHETER, RIGHT HEART;  Surgeon: Lizz Nieto MD;  Location: [facility] CATH LAB;  Service: Cardiology;  Laterality: Right;    RIGHT HEART CATHETERIZATION Right 6/17/2022    Procedure: INSERTION, CATHETER, RIGHT HEART;  Surgeon: Migue Henry Jr., MD;  Location: [facility] CATH LAB;  Service: Cardiology;  Laterality: Right;    SPINE SURGERY      TONSILLECTOMY      TRIGGER FINGER RELEASE Right 04/2018    thumb       Living Environment:  Pt splits time between living at his sister's home and girlfriend's trailer. Sister home is 1 story with 4 SALEEM, B HR with a tub/shower combo to utilize. Girlfriend's trailer has 5 SALEEM with B HR, tub/showeer combo.    PLOF:  Prior to admission, patient was independent with basic mobility and ADL's. Reports his main deficit was endurance, stating he could only walk about 1/2 of a block before becoming fatigued.    DME:  Patient owns or has access to the following DME: Rolling Walker, Bedside Commode and Wheelchair  (owns DME, not utilizing)    Upon discharge, patient will have assistance from sister or girlfriend (both are available to help per patient).    Objective:     Patient found with: telemetry, PICC line    Pain:  Pain Rating 1: 0/10  Pain Rating Post-Intervention 1: 0/10    Cognitive Exam:  Patient is oriented to Person, Place, Time and Situation.  Patient follows 100% of single-step commands.    Sensation:   Intact at BLE to LT    Lower Extremity Range of Motion:  Right Lower Extremity: WFL actively  Left Lower Extremity: WFL actively    Lower Extremity Strength:  Right Lower Extremity: grossly 4/5 via MMT  Left Lower Extremity: grossly 4/5 via MMT    Functional Mobility:    · Bed Mobility:  · Supine to Sitting: Independent (using UE)  · Sitting to Supine: Independent (using UE)    · Transfers:  · Sit to Stand: Supervision from EOB with no AD x 2 trial(s)    · Gait:  · 400 feet in hallways (wearing mask) on portable monitor (telemetry) and PICC line infusing.  · Therapist providing stand-by assistance but no losses of balance noted  · Difficulty holding conversation towards end, audible SOB noted. HR 89 bpm, pulse ox 100% on room air at end of ambulation trial    · Assist level: Stand-By Assist  · Device: no AD   · Oxygen: Room Air    · Balance:  · Static Sit: Independent at EOB    · Static Stand: Independent with no AD    Additional Therapeutic Activity/Exercises:     1. He was resting in bed with no family/friends present upon PT/OT entry to room, agreeable to evaluation. Reports he's been more easily fatigued past few weeks, difficulty walking > 1/2 block at home.    2. He demonstrates ability to transition in/out of bed independently using his UE for support. Ambulates 400 ft in hallways (wearing mask) on portable monitor (telemetry) and PICC line infusing. Difficulty holding conversation towards end, audible SOB noted. HR 89 bpm, pulse ox 100% on room air at end of ambulation trial.    3. Discussed PT role, POC,  goals and recommendations (Home with family, no DME needs) with patient; verbalized understanding.    AM-PAC 6 CLICK MOBILITY  Turning over in bed (including adjusting bedclothes, sheets and blankets)?: 4  Sitting down on and standing up from a chair with arms (e.g., wheelchair, bedside commode, etc.): 4  Moving from lying on back to sitting on the side of the bed?: 4  Moving to and from a bed to a chair (including a wheelchair)?: 4  Need to walk in hospital room?: 4  Climbing 3-5 steps with a railing?: 3  Basic Mobility Total Score: 23    Patient was left supine in bed (HOB elevated) with all lines intact, call button in reach and RN present.    Clinical Decision Making for Evaluation Complexity:  1. Body System(s) Examination: 1-2  2. Clinical Presentation: Evolving  3. Evaluation Complexity: Low    GOALS:   Multidisciplinary Problems     Physical Therapy Goals        Problem: Physical Therapy    Goal Priority Disciplines Outcome Goal Variances Interventions   Physical Therapy Goal     PT, PT/OT      Description: Goals to be met by: 22     Patient will increase functional independence with mobility by performin. Supine to sit with Modified Rogers within sternal precautions (no pushing/pulling) in preparation for possible VAD - Not met  2. Sit to stand transfer with Modified Rogers x 5 consecutive trials without use of UE - Not met  3. Gait  x 800 feet with Rogers using No Assistive Device without fatigue or SOB noted - Not met  4. Ascend/descend 5 stairs with bilateral Handrail with Stand-by Assistance using No Assistive Device - Not met                 Ernie Wilson, PT  2022

## 2022-06-22 NOTE — ED PROVIDER NOTES
Encounter Date: 6/21/2022       History     Chief Complaint   Patient presents with    Chest Pain     Pt reports CP, dizziness, and weakness that began this morning. Pt has dobutamine connected to his PICC line in triage. Extensive cardiac hx.      HPI     72 year old male with extensive cardiac comorbidities including ICM (EF 15%) on home  2.5, s/p St-Rodri CRT-D 5/2019, history of VT, HTN, HLD, CAD s/p MI who presents with fatigue x2 days and chest tightness x1d. Substernal cp, non radiating, intermittent, and 6/10. Feels pressure like. SOB after half a block, which is worse than his baseline. Recently had entresto increased to 49/51, think this may be attributing to sx. Also has a hx of PE, denies leg swelling/pain. No n/v/d, normal BM, normal PO intake. Has been around a lot of covid + patients lately.     Review of patient's allergies indicates:   Allergen Reactions    Iodine containing multivitamin Swelling     itching    Keflex [cephalexin] Swelling     Eyes.  Tolerated multiple doses of zosyn and 1 dose of augmentin in 2015 and 2016, respectively    Peaches [peach (prunus persica)] Swelling     eyes    Shellfish containing products Swelling    Fig tree Swelling     itching    Tuberculin spenser test ppd Rash     Past Medical History:   Diagnosis Date    Acute hypoxemic respiratory failure 11/7/2015    Acute idiopathic gout of left knee 12/2/2019    Acute idiopathic gout of right elbow 9/23/2021    AICD (automatic cardioverter/defibrillator) present 12/13/2015      Anticoagulant long-term use     Bronchopneumonia 12/20/2021    CHF (congestive heart failure)     Chronic anticoagulation 5/5/2016    Chronic combined systolic and diastolic heart failure 11/26/2012    EF 10-20% on ECHO 2013    Chronic gout 12/2/2019    Clotting disorder     Coronary artery disease involving native coronary artery of native heart without angina pectoris 11/26/2012    Cath 10- Stents patent  non-obstructive disease Cath 11-34121 non-obstructive disease     COVID-19 08/2021    Had antibody infusion    Diverticulosis of colon     DVT (deep venous thrombosis), unspecified laterality 11/12/2015    Dysphonia 1/28/2018    Essential hypertension 11/15/2015    Fine motor impairment 2/2/2021    Hyperlipidemia     Hypertensive heart disease with heart failure 5/5/2016    MI (myocardial infarction) 2009    x's 5    Nicotine abuse     Obesity 11/26/2012    Olecranon bursitis of right elbow 9/19/2021    Pulmonary embolism 2011    Renal disorder     LAVERNE    Right carpal tunnel syndrome 4/6/2018    Stented coronary artery 11/26/2012    LAD stent placed 10/17/2007     Trigger thumb of right hand 4/6/2018     Past Surgical History:   Procedure Laterality Date    APPENDECTOMY      BACK SURGERY      CARDIAC DEFIBRILLATOR PLACEMENT      CARDIAC SURGERY  2007    stent    CARPAL TUNNEL RELEASE Right 04/2018    INSERTION OF BIVENTRICULAR IMPLANTABLE CARDIOVERTER-DEFIBRILLATOR (ICD) N/A 5/3/2019    Procedure: INSERTION, ICD, BIVENTRICULAR;  Surgeon: Teofilo Pal MD;  Location: [facility] EP LAB;  Service: Cardiology;  Laterality: N/A;  ICH CM,  ICD UPGD BI-V,  SJM, MAC, FAS, 3PREP (dual ICD INSITU)    r knee scope      REVISION OF SKIN POCKET FOR CARDIOVERTER-DEFIBRILLATOR Left 5/3/2019    Procedure: Revision, Skin Pocket, For Cardioverter-Defibrillator;  Surgeon: Teofilo Pal MD;  Location: [facility] EP LAB;  Service: Cardiology;  Laterality: Left;    RIGHT HEART CATHETERIZATION Right 6/14/2021    Procedure: INSERTION, CATHETER, RIGHT HEART;  Surgeon: Lizz Nieto MD;  Location: [facility] CATH LAB;  Service: Cardiology;  Laterality: Right;    RIGHT HEART CATHETERIZATION Right 6/17/2022    Procedure: INSERTION, CATHETER, RIGHT HEART;  Surgeon: Migue Henry Jr., MD;  Location: [facility] CATH LAB;  Service: Cardiology;  Laterality: Right;    SPINE SURGERY      TONSILLECTOMY      TRIGGER FINGER  RELEASE Right 2018    thumb     Family History   Problem Relation Age of Onset    Cancer Mother         colon cancer    Heart disease Mother     Diabetes Mother     Heart disease Father     Stroke Father     Colon polyps Father     Cancer Paternal Grandmother         leukemia    No Known Problems Sister     No Known Problems Daughter     No Known Problems Son     No Known Problems Sister     No Known Problems Son      Social History     Tobacco Use    Smoking status: Former Smoker     Packs/day: 1.00     Years: 45.00     Pack years: 45.00     Types: Cigarettes     Quit date: 2005     Years since quittin.5    Smokeless tobacco: Never Used    Tobacco comment: 1-1.5 ppd every day.   Substance Use Topics    Alcohol use: No    Drug use: No     Review of Systems   Constitutional: Positive for fatigue. Negative for fever.   HENT: Negative for sore throat.    Respiratory: Positive for chest tightness and shortness of breath.    Cardiovascular: Negative for chest pain.   Gastrointestinal: Negative for nausea.   Genitourinary: Negative for dysuria.   Musculoskeletal: Negative for back pain.   Skin: Negative for rash.   Neurological: Negative for weakness.   Hematological: Does not bruise/bleed easily.       Physical Exam     Initial Vitals [22 2331]   BP Pulse Resp Temp SpO2   110/66 87 18 97.8 °F (36.6 °C) 98 %      MAP       --         Physical Exam    Nursing note and vitals reviewed.  Constitutional: He appears well-developed and well-nourished.   Elderly male laying in bed, able to speak in full sentences.    HENT:   Head: Normocephalic and atraumatic.   Right Ear: External ear normal.   Left Ear: External ear normal.   Eyes: EOM are normal. Pupils are equal, round, and reactive to light.   Neck: No thyromegaly present. No tracheal deviation present. No JVD present.   Normal range of motion.  Cardiovascular: Normal rate and regular rhythm. Exam reveals no friction rub.    No murmur  heard.  Pulmonary/Chest: Breath sounds normal. No stridor. No respiratory distress. He has no wheezes. He has no rales.   Abdominal: Abdomen is soft. He exhibits no distension. There is no abdominal tenderness.   Genitourinary:    Penis normal.     Musculoskeletal:         General: Normal range of motion.      Cervical back: Normal range of motion.     Neurological: He is alert and oriented to person, place, and time. GCS score is 15. GCS eye subscore is 4. GCS verbal subscore is 5. GCS motor subscore is 6.   Skin: Skin is warm and dry. Capillary refill takes less than 2 seconds.         ED Course   Procedures  Labs Reviewed - No data to display       Imaging Results    None          Medications - No data to display  Medical Decision Making:   Initial Assessment:   72 year old male with extensive cardiac comorbidities including ICM (EF 15%) on home  2.5, s/p St-Rodri CRT-D 5/2019, history of VT, HTN, HLD, CAD s/p MI who presents with fatigue x2 days and chest tightness x1d.  Differential Diagnosis:   ACS, Covid, viral syndrome  Independently Interpreted Test(s):   I have ordered and independently interpreted X-rays - see prior notes.  I have ordered and independently interpreted EKG Reading(s) - see prior notes  Clinical Tests:   Lab Tests: Reviewed  Radiological Study: Reviewed  ED Management:  - Cardiac workup showed mild elev in trop, that is actually below patients baseline   - Extensive infectious workup ordered  - Cardiology consulted, will admit to HTS floor for obs  - Stable for admission                         Clinical Impression:   Final diagnoses:  [R07.9] Chest pain                 Roseanna Sauceda MD  Resident  06/22/22 0601

## 2022-06-22 NOTE — PLAN OF CARE
[facility] DETERMINATION: IP AND CURRENT ORDERS ARE IP Recommendation Summary ? 06/22/2022: Inpatient Recommendation for 06/22/2022 Supporting Clinical Factors: ¦ Medical history: ¦ Symptomatic valvular heart disease The Attending Physician Concern: The concerns of the attending physician are for cardiomyopathy, ischemic and CKD stage 3a. Rationale: Inpatient services are appropriate for this elderly patient who presented to ED due to dizziness and fatigue for the past two days, history of ischemic cardiomyopathy (EF 15%) on home dobutamine 2.5, ventricular tachycardia, hypertension, hyperlipidemia, CAD status post MI, status post cardiac resynchronization therapy defibrillator (5/2019), in the setting of chest x-ray notable for new small bilateral pleural effusions and interval worsening of pulmonary congestion, slightly hypervolemic, decreased bibasilar breath sounds, extended abdomen, positive JVD 4cm above clavicle on examination placing this patient at risk for cardiopulmonary decompensation. Plan of Care Includes: The plan of care includes Lasix 80mg IV x 1, continue dobutamine 2.5mg daily via peripherally inserted central catheter continue guideline-directed medical therapy including Entresto 49-51mg twice daily, Jardiance 25 mg, Aldactone 25, amiodarone 300mg daily, close monitoring of renal function, repeat hemodialysis, hold oral Lasix 40, transthoracic echocardiogram, pro BNP, urine analysis and renal ultrasound.

## 2022-06-22 NOTE — HPI
Audrey Oneil Jr. is a 72 y.o. male. with ICM (EF 15%) on home  2.5 (previously refused LVAD, but now amenable), s/p St-Rodri CRT-D 5/2019, history of VT, HTN, HLD, CAD s/p MI who  presents to ED for dizziness and fatigue past two days.  In ED work up unremarkable from infectious standpoint including afebrile with out white count and clear CXR. In terms of volume status he has had difficult to ascertain fluid status in past with further issues of optimization of GDMT 2/2 hypotension. He has had two admissions in past couple of months for symptomatic hypotension. Last RHC last week with elevated left sided filling pressures (PCWP 25) and mildly elevated right sided pressures. Plan at that time to increase entresto 49-51 mg BID with eventual discontinuation of dobutamine. At home he also takes Jardiance 10mg, aldactone 25, and lasix 40 prn instead of daily.     Right Heart Pressures  RA: 10/ 7/ 8 RV: 40/ 11 PA: 40/ 25/ 30 PWP: 25/ 22/ 23 .   Cardiac output was 5.2  by Johnna. Cardiac index is 2.5 L/min/m2.   O2 Sat: PA 66%.   Pulmonary vascular resistance: 108. Systemic vascular resistance: 1300.   /77 (93); 97%  Wedge proven with fluoroscopy  On  2.5 mcg/kg/min      CPX  · Severe functional impairment associated with a normal breathing reserve, normal oxygen stauration, poor effort. These findings are indicative of functional impairment secondary to poor effort.  · There were no arrhythmias during stress.  · There was no ST segment deviation noted during stress.  · The patient's exercise capacity was severely impaired.  · The test was stopped because the patient experienced fatigue.    TTE 5/29/22  · The left ventricle is normal in size with eccentric hypertrophy and severely decreased systolic function.  · The estimated ejection fraction is 10-15%.  · Grade I left ventricular diastolic dysfunction.  · Normal right ventricular size with normal right ventricular systolic function.  · Normal central venous  pressure (3 mmHg).

## 2022-06-22 NOTE — PT/OT/SLP EVAL
Occupational Therapy  Co-Evaluation, Treatment, & Discharge Note    Name: Audrey Oneil Jr.  MRN: [id number]  Admitting Diagnosis:  <principal problem not specified>    Length of Stay: 0 days    Recommendations:     Discharge Recommendations: home (no needs)  Discharge Equipment Recommendations:  none  Barriers to discharge:  None    Assessment:     Pt mainly limited by impaired endurance on this date. Pt requiring modified independence for bed mobility, stand by assistance for functional mobility with no assistive device, and supervision for ADLs (LE dressing) while seated EOB. Pt with good motivation and good tolerance for therapy. Pt should be safe to discharge home with no further need for skilled OT services; will continue to monitor.     Plan:     During this hospitalization, patient does not require further acute OT services.  Please re-consult if situation changes.    · Plan of Care Reviewed with: patient    Subjective     Communicated with: VINITA Gonzalez prior to session.  Patient found left sidelying with PICC line, telemetry and no family present upon OT entry to room.    Chief Complaint: Chest Pain (Pt reports CP, dizziness, and weakness that began this morning. Pt has dobutamine connected to his PICC line in triage. Extensive cardiac hx. )    Patient/Family Comments/goals: Pt reports he gets fatigued after walking about a block's distance.    Pain/Comfort:  · Pain Rating 1: 0/10  · Pain Rating Post-Intervention 1: 0/10    Patients cultural, spiritual, Anabaptist conflicts given the current situation: no    Occupational Profile:  Living Environment: Pt splits his time between his sister and his girlfriends' houses, both single-story houses/trailers with 4-5 SALEEM and tub/shower combo setups.   Prior Level of Function: Patient reports being independent with mobility & with ADLs.   Patient uses DME as follows: none.   DME owned (not currently used): walker, rolling, wheelchair, bedside  "commode.  Roles/Responsibilities:   Work: no.   Drive: is able to drive, but only drives if absolutely necessary; otherwise, receives transportation from friends/family.   Managing Medicines/Managing Home: no.   Hobbies/Interests: traveling, scuba diving, rock hunting.  Equipment Used at Home:  walker, rolling, wheelchair, bedside commode (owned, not used)    Patient reports they will have assistance from girlfriend, sister upon discharge.      Objective:     Patient found with: PICC line, telemetry   General Precautions: Standard, fall   Orthopedic Precautions:N/A   Braces: N/A   Oxygen Device: Room Air  Vitals: BP (!) 102/58 (BP Location: Left arm, Patient Position: Lying)   Pulse 77   Temp 97.5 °F (36.4 °C) (Tympanic)   Resp 18   Ht 5' 7" (1.702 m)   Wt 96.7 kg (213 lb 3 oz)   SpO2 97%   BMI 33.39 kg/m²     Cognitive and Psychosocial Function:   · AxOx -- Not formally tested, no signs of disorientation noted   · Follows Commands/attention:follows multi-step commands  · Communication:  clear/fluent  · Memory: No Deficits noted  · Safety awareness/insight to disability: intact   · Mood/Affect/Coping skills/emotional control: Appropriate to situation    Hearing: Intact    Vision:  Wears reading glasses     Physical Exam:  Postural examination/scapula alignment:    -       Rounded shoulders     Left UE Right UE   UE Edema absent absent   UE ROM AROM WFL AROM WFL   UE Strength Grossly 4/5   Grossly 4/5    Strength grasp WFL grasp WFL   Sensation LUE IMPAIRED: pt reports numbness RUE INTACT: WFL   Fine Motor Coordination:  LUE IMPAIRED: hand thumb/finger opposition skills (minimal) RUE IMPAIRED: hand thumb/finger opposition skills (minimal)   Gross Motor Coordination: LUE INTACT: WFL RUE INTACT:WFL     Occupational Performance:  Bed Mobility:    · Patient completed Supine to Sit with modified independence on left side of bed  · Scooting anteriorly to EOB to have both feet planted on floor: modified " independence  · Patient completed Sit to Supine with modified independence on left side of bed    Functional Mobility/Transfers:   Static Sitting EOB: Supervision    Dynamic Sitting EOB: Supervision while adjusting  sock   Patient completed Sit <> Stand Transfer with stand by assistance  with  no assistive device (increased assistance 2/2 presence of lines)   Static Standing Balance: Stand by assistance (increased assistance 2/2 presence of lines)   Transitional Movements: Pt demonstrated ability to lower self to long sitting position on floor (completed intentionally) and then transition to standing by using bedrails to hoist self with stand by assistance   Functional Mobility: Pt ambulated household distance (400 ft) in hallway with stand by assistance with no assistive device (increased assistance 2/2 presence of lines)  o No LOB or c/o dizziness  o SOB noted toward end of ax  o IV pole in tow and managed by therapist  o Vitals following ax:  - HR 89  - SpO2 100%    Activities of Daily Living:  · Lower Body Dressing: supervision to adjust  sock while seated EOB with pt using modified figure-4 technique      AMPA 6 Click ADL:  AMPAC Total Score: 24    Treatment & Education:  - Educated on role of OT and plan for discharge from skilled OT services at this time  - Educated pt on safety while inpatient, e.g. monitoring energy levels prior to functional mobility and self-care and using adaptive techniques if needed (e.g. energy conservation techniques)  - Pt denies any further questions, concerns, or requests at this time    Patient left supine with all lines intact, call button in reach and RN present    GOALS:   Multidisciplinary Problems     Occupational Therapy Goals     Not on file          Multidisciplinary Problems (Resolved)        Problem: Occupational Therapy    Goal Priority Disciplines Outcome Interventions   Occupational Therapy Goal   (Resolved)     OT, PT/OT Met    Description:  Occupational therapy goals not needed at this time.                     History:     Past Medical History:   Diagnosis Date    Acute hypoxemic respiratory failure 11/7/2015    Acute idiopathic gout of left knee 12/2/2019    Acute idiopathic gout of right elbow 9/23/2021    AICD (automatic cardioverter/defibrillator) present 12/13/2015      Anticoagulant long-term use     Bronchopneumonia 12/20/2021    CHF (congestive heart failure)     Chronic anticoagulation 5/5/2016    Chronic combined systolic and diastolic heart failure 11/26/2012    EF 10-20% on ECHO 2013    Chronic gout 12/2/2019    Clotting disorder     Coronary artery disease involving native coronary artery of native heart without angina pectoris 11/26/2012    Cath 10- Stents patent non-obstructive disease Cath 11-12015 non-obstructive disease     COVID-19 08/2021    Had antibody infusion    Diverticulosis of colon     DVT (deep venous thrombosis), unspecified laterality 11/12/2015    Dysphonia 1/28/2018    Essential hypertension 11/15/2015    Fine motor impairment 2/2/2021    Hyperlipidemia     Hypertensive heart disease with heart failure 5/5/2016    MI (myocardial infarction) 2009    x's 5    Nicotine abuse     Obesity 11/26/2012    Olecranon bursitis of right elbow 9/19/2021    Pulmonary embolism 2011    Renal disorder     LAVERNE    Right carpal tunnel syndrome 4/6/2018    Stented coronary artery 11/26/2012    LAD stent placed 10/17/2007     Trigger thumb of right hand 4/6/2018       Past Surgical History:   Procedure Laterality Date    APPENDECTOMY      BACK SURGERY      CARDIAC DEFIBRILLATOR PLACEMENT      CARDIAC SURGERY  2007    stent    CARPAL TUNNEL RELEASE Right 04/2018    INSERTION OF BIVENTRICULAR IMPLANTABLE CARDIOVERTER-DEFIBRILLATOR (ICD) N/A 5/3/2019    Procedure: INSERTION, ICD, BIVENTRICULAR;  Surgeon: Teofilo Pal MD;  Location: [facility] EP LAB;  Service: Cardiology;  Laterality: N/A;   ICH CM,  ICD UPGD BI-V,  SJM, MAC, FAS, 3PREP (dual ICD INSITU)    r knee scope      REVISION OF SKIN POCKET FOR CARDIOVERTER-DEFIBRILLATOR Left 5/3/2019    Procedure: Revision, Skin Pocket, For Cardioverter-Defibrillator;  Surgeon: Teofilo Pal MD;  Location: [facility] EP LAB;  Service: Cardiology;  Laterality: Left;    RIGHT HEART CATHETERIZATION Right 6/14/2021    Procedure: INSERTION, CATHETER, RIGHT HEART;  Surgeon: Lizz Nieto MD;  Location: [facility] CATH LAB;  Service: Cardiology;  Laterality: Right;    RIGHT HEART CATHETERIZATION Right 6/17/2022    Procedure: INSERTION, CATHETER, RIGHT HEART;  Surgeon: Migue Henry Jr., MD;  Location: [facility] CATH LAB;  Service: Cardiology;  Laterality: Right;    SPINE SURGERY      TONSILLECTOMY      TRIGGER FINGER RELEASE Right 04/2018    thumb         Time Tracking:     OT Date of Treatment: 06/22/22  OT Start Time: 1120  OT Stop Time: 1144  OT Total Time (min): 24 min  Additional staff present: PT      Billable Minutes:Evaluation 12  Therapeutic Activity 12      6/22/2022

## 2022-06-22 NOTE — PLAN OF CARE
Adurey Oneil Jr. is a 72 y.o. male admitted to [facility] on 2022 for coronary artery disease. Audrey Oneil Jr. tolerated evaluation well today. He was resting in bed with no family/friends present upon PT/OT entry to room, agreeable to evaluation. Reports he's been more easily fatigued past few weeks, difficulty walking > 1/2 block at home. He demonstrates ability to transition in/out of bed independently using his UE for support. Ambulates 400 ft in hallways (wearing mask) on portable monitor (telemetry) and PICC line infusing; therapist providing stand-by assistance but no losses of balance noted. Difficulty holding conversation towards end, audible SOB noted. HR 89 bpm, pulse ox 100% on room air at end of ambulation trial. Discussed PT role, POC, goals and recommendations (Home with family, no DME needs) with patient; verbalized understanding. Audrey Oneil Jr. would benefit from acute PT services to promote mobility during this admission and improve return to PLOF.    Problem: Physical Therapy  Goal: Physical Therapy Goal  Description: Goals to be met by: 22     Patient will increase functional independence with mobility by performin. Supine to sit with Modified [place] within sternal precautions (no pushing/pulling) in preparation for possible VAD - Not met  2. Sit to stand transfer with Modified [place] x 5 consecutive trials without use of UE - Not met  3. Gait  x 800 feet with [place] using No Assistive Device without fatigue or SOB noted - Not met  4. Ascend/descend 5 stairs with bilateral Handrail with Stand-by Assistance using No Assistive Device - Not met  Outcome: Ongoing, Progressing    Ernie Wilson, PT  2022   Christian

## 2022-06-23 NOTE — HOSPITAL COURSE
Mr Oneil was admitted on 6/21 w/ fatigue symptoms w/ minimal exertion. Echo showed normal RV function. He was given an IVP of lasix in the ED, but was found on the floor to be volume down so diuretics were held and symptomatically he felt better and his LAVERNE trending towards resolution. He is being discharged on 6/23 at a dry weight of 96.2 kg. GDMT will include entresto 49-51 mg BID, aldactone 25 daily and Jardiance. He is going to undergo remaining parts of VAD/OHTx workup as outpatient.

## 2022-06-23 NOTE — PLAN OF CARE
[facility]   Heart Transplant Clinic  [address] [place] [address]   [phone] after hours        HOME  HEALTH ORDERS      Admit to Home Health    Diagnosis:   Patient Active Problem List   Diagnosis    Coronary artery disease involving native coronary artery of native heart without angina pectoris    Acute on chronic combined systolic and diastolic heart failure    Obesity (BMI 30.0-34.9)    Cardiomyopathy, ischemic    Hx pulmonary embolism 2010 provoked dvt     Postural dizziness with presyncope    History of DVT (deep vein thrombosis)    Ventricular tachycardia    Hypertensive cardiovascular-renal disease, stage 1-4 or unspecified chronic kidney disease, with heart failure    Presence of cardiac resynchronization therapy defibrillator (CRT-D)    Mixed hyperlipidemia    Long term current use of amiodarone    Thoracic aortic atherosclerosis    Stage 3a chronic kidney disease    Prediabetes    LBBB (left bundle branch block)    Elevated troponin I level    Gout    Chronic combined systolic and diastolic congestive heart failure    Essential hypertension    H/O ventricular tachycardia    LAVERNE (acute kidney injury)    Vitamin D deficiency    Blue skin    MRSA infection    Rotator cuff syndrome    BMI 38.0-38.9,adult    Severe sepsis    Clostridium difficile infection    E. coli UTI    Pressure injury of heel, stage 2    Dermatitis associated with moisture    Skin breakdown    Leukocytosis       Patient is homebound due to:   Diet: Low Sodium  Acitivities: As Tolerated    Nursing:   SN to complete comprehensive assessment including routine vital signs. Instruct on disease process and s/s of complications to report to MD. Review/verify medication list sent home with the patient at time of discharge  and instruct patient/caregiver as needed. Frequency may be adjusted depending on start of care date.    Notify MD if SBP > 160 or <  90; DBP > 90 or < 50; HR > 120 or < 50; Temp > 101; Weight gain >3lbs in 1 day or 5lbs in 1 week.    LABS:  SN to perform labs: Weekly CBC, BMP, Magnesium    HOME INFUSION THERAPY:    SN to perform Infusion Therapy/Central Line Care.  Review Central Line Care & Central Line Flush with patient.    Administer (drug and dose): Dobutamine 2.5 mcg/kg/min x 96.2 kg, Intravenous, Continuous    **For questions or concerns, please call [phone] and ask for Pre-Heart transplant clinic, M-F 8-5. After hours, weekends, call [phone] and ask for the Heart Transplant Cardiologist on call.**    Central line care:  Scrub the Hub: Prior to accessing the line, always perform a 30 second alcohol scrub  Each lumen of the central line is to be flushed at least daily with 10 mL Normal Saline and 3 mL Heparin flush (100 units/mL)  Skilled Nurse (SN) may draw blood from IV access  Blood Draw Procedure:   - Aspirate at least 5 mL of blood   - Discard   - Obtain specimen   - Change posiflow cap   - Flush with 20 mL Normal Saline followed by a                 3-5 mL Heparin flush (100 units/mL)  Central :   - Sterile dressing changes are done weekly and as needed.   - Use chlor-hexadine scrub to cleanse site, apply Biopatch to insertion site,       apply securement device dressing   - Posi-flow caps are changed weekly and after EVERY lab draw.   - If sterile gauze is under dressing to control oozing,                 dressing change must be performed every 24 hours until gauze is not needed.    CONSULTS:        Send initial Home Health orders to HTS attending physician on call.  Send follow up questions to [phone] or fax:          Pre Transplant:   [phone]

## 2022-06-23 NOTE — NURSING
Dobutamine infusion through alaris pump stopped. Patient transitioned self to home dobutamine pump through PICC line in right upper arm.

## 2022-06-23 NOTE — DISCHARGE SUMMARY
[facility] - Cardiac Intensive Care  Heart Transplant  Discharge Summary      Patient Name: Audrey Oneil Jr.  MRN: [id number]  Admission Date: 6/21/2022  Hospital Length of Stay: 1 days  Discharge Date and Time: 06/23/2022 3:17 PM  Attending Physician: Cherelle Hidalgo DO   Discharging Provider: Venkatesh Love PA-C  Primary Care Provider: Primary Doctor No     HPI: Audrey Oneil Jr. is a 72 y.o. male. with ICM (EF 15%) on home  2.5 (previously refused LVAD, but now amenable), s/p St-Rodri CRT-D 5/2019, history of VT, HTN, HLD, CAD s/p MI who  presents to ED for dizziness and fatigue past two days.  In ED work up unremarkable from infectious standpoint including afebrile with out white count. In terms of volume status, overall slightly hypervolemic per my exam with JVD 4cm above clavicle, decreased bibasilar breath sounds and extended abdomen. CXR notable for new small bilateral pleural effusions and interval worsening of pulmonary congestion. Of note, he is prescribed lasix 40mg prn and has stated that past two days he has felt more LE edema and has been taking BID. Last RHC last week with elevated left sided filling pressures (PCWP 25) and mildly elevated right sided pressures. His entresto was increased to 49-51 mg BID with plans for eventual discontinuation of dobutamine. At home he also takes Jardiance 25mg, aldactone 25, and lasix 40 prn. Of note, he has had  issues of optimization of GDMT 2/2 hypotension and  has had two admissions in past couple of months for symptomatic hypotension requiring downtitration of meds.     Hospital Course: Mr Oneil was admitted on 6/21 w/ fatigue symptoms w/ minimal exertion. Echo showed normal RV function. He was given an IVP of lasix in the ED, but was found on the floor to be volume down so diuretics were held and symptomatically he felt better and his LAVERNE trending towards resolution. He is being discharged on 6/23 at a dry weight of 96.2 kg. GDMT will include entresto  49-51 mg BID, aldactone 25 daily and Jardiance. He is going to undergo remaining parts of VAD/OHTx workup as outpatient.        Goals of Care Treatment Preferences:  Code Status: Full Code      Consults (From admission, onward)        Status Ordering Provider     Inpatient consult to PICC team (NAJMA)  Once        Provider:  (Not yet assigned)    Completed DALIA ISSA     Inpatient consult to Cardiology  Once        Provider:  (Not yet assigned)    Completed GIACOMO RODRIGUEZ          Significant Diagnostic Studies: Labs: All labs within the past 24 hours have been reviewed    Pending Diagnostic Studies:     Procedure Component Value Units Date/Time    Legionella antigen, urine [[id number]] Collected: 06/22/22 0214    Order Status: Sent Lab Status: In process Updated: 06/22/22 0223    Specimen: Urine, Clean Catch     Strep Pneumo AG Urine [[id number]] Collected: 06/22/22 0214    Order Status: Sent Lab Status: In process Updated: 06/22/22 0223    Specimen: Urine, Clean Catch         Final Active Diagnoses:    Diagnosis Date Noted POA    PRINCIPAL PROBLEM:  Coronary artery disease involving native coronary artery of native heart without angina pectoris [I25.10] 11/26/2012 Yes     Chronic    History of DVT (deep vein thrombosis) [Z86.718] 11/12/2015 Not Applicable     Chronic    Cardiomyopathy, ischemic [I25.5] 11/26/2012 Yes     Chronic      Problems Resolved During this Admission:      Discharged Condition: stable    Disposition: Home or Self Care    Follow Up:    Patient Instructions:   No discharge procedures on file.  Medications:  Reconciled Home Medications:      Medication List      CONTINUE taking these medications    allopurinoL 100 MG tablet  Commonly known as: ZYLOPRIM  Take 2 tablets (200 mg total) by mouth once daily.     amiodarone 200 MG Tab  Commonly known as: PACERONE  TAKE 1 AND 1/2 TABLETS(300 MG) BY MOUTH EVERY DAY     aspirin 81 MG EC tablet  Commonly known as: ECOTRIN  Take 1 tablet (81 mg  total) by mouth once daily.     atorvastatin 80 MG tablet  Commonly known as: LIPITOR  Take 1 tablet (80 mg total) by mouth once daily.     DOBUTamine 500 mg/250 mL (2,000 mcg/mL)  Commonly known as: DOBUTREX  2.5 mcg/kg/min  Patient is 95.7 kg     empagliflozin 10 mg tablet  Commonly known as: JARDIANCE  Take 1 tablet (10 mg total) by mouth once daily.     ENTRESTO 49-51 mg per tablet  Generic drug: sacubitriL-valsartan  Take 1 tablet by mouth 2 (two) times daily.     furosemide 40 MG tablet  Commonly known as: LASIX  Take 1 tablet (40 mg total) by mouth daily as needed (Weight gain of 3 lbs in one day or 5 lbs in one week).     ondansetron 4 MG Tbdl  Commonly known as: ZOFRAN-ODT  Dissolve 1 tablet (4 mg total) by mouth every 6 (six) hours as needed (nausea).     simethicone 80 MG chewable tablet  Commonly known as: MYLICON  Take 1 tablet (80 mg total) by mouth every 6 (six) hours as needed for Flatulence.     spironolactone 25 MG tablet  Commonly known as: ALDACTONE  Take 1 tablet (25 mg total) by mouth once daily.        STOP taking these medications    clopidogreL 75 mg tablet  Commonly known as: PLAVIX     colchicine 0.6 mg tablet  Commonly known as: COLCRYS     diphenhydrAMINE 25 mg capsule  Commonly known as: BENADRYL     ipratropium 0.02 % nebulizer solution  Commonly known as: ATROVENT     metoprolol succinate 25 MG 24 hr tablet  Commonly known as: TOPROL-XL     midodrine 2.5 MG Tab  Commonly known as: PROAMATINE     OPW TEST CLAIM - DO NOT FILL     oxyCODONE-acetaminophen 5-325 mg per tablet  Commonly known as: PERCOCET            Venkatesh Love PA-C  Heart Transplant  [facility] - Cardiac Intensive Care

## 2022-06-23 NOTE — PROGRESS NOTES
Interdisciplinary Rounds Report:   Attended interdisciplinary rounds with the [facility]/CTS services including the LVAD Coordinators, social workers, cardiologists, surgeons,  PT/OT/Speech, dietician, and unit charge nurses. Discussed patient status, plan of care, goals of care, including DC date, and post discharge needs. Plan of care will be discussed with the patient and/or family per the physician while rounding on the floor. This is a recurring meeting that is medically and socially necessary to collaborate with the interdisciplinary team to assist patient needs and safe discharge.

## 2022-06-23 NOTE — PROGRESS NOTES
[facility] - Cardiac Intensive Care  Heart Transplant  Progress Note    Patient Name: Audrey Oneil Jr.  MRN: [id number]  Admission Date: 6/21/2022  Hospital Length of Stay: 1 days  Attending Physician: Cherelle Hidalgo DO  Primary Care Provider: Primary Doctor No  Principal Problem:Coronary artery disease involving native coronary artery of native heart without angina pectoris    Subjective:     Interval History: Admitted yesterday w/ fatigue symptoms w/ minimal activity. Is stable and appears euvolemic if not dry. Symptoms are improved with holding diuresis. Kidney function stable w/ sCr of 1.9    Continuous Infusions:   DOBUTamine IV infusion (non-titrating) 2.5 mcg/kg/min (06/22/22 0458)     Scheduled Meds:   allopurinoL  200 mg Oral Q48H    amiodarone  300 mg Oral Daily    aspirin  81 mg Oral Daily    atorvastatin  40 mg Oral QHS    enoxaparin  40 mg Subcutaneous Daily    mupirocin   Nasal BID    polyethylene glycol  17 g Oral Daily    sacubitriL-valsartan  1 tablet Oral BID    senna-docusate 8.6-50 mg  1 tablet Oral BID    spironolactone  25 mg Oral Daily     PRN Meds:acetaminophen, HYDROcodone-acetaminophen, melatonin, simethicone, sodium chloride 0.9%    Review of patient's allergies indicates:   Allergen Reactions    Iodine containing multivitamin Swelling     itching    Keflex [cephalexin] Swelling     Eyes.  Tolerated multiple doses of zosyn and 1 dose of augmentin in 2015 and 2016, respectively    Peaches [peach (prunus persica)] Swelling     eyes    Shellfish containing products Swelling    Fig tree Swelling     itching    Tuberculin spenser test ppd Rash     Objective:     Vital Signs (Most Recent):  Temp: 97.2 °F (36.2 °C) (06/23/22 1153)  Pulse: 72 (06/23/22 1200)  Resp: 18 (06/23/22 1153)  BP: (!) 100/57 (06/23/22 1153)  SpO2: 97 % (06/23/22 1234)   Vital Signs (24h Range):  Temp:  [97.2 °F (36.2 °C)-98.1 °F (36.7 °C)] 97.2 °F (36.2 °C)  Pulse:  [70-92] 72  Resp:  [18-20] 18  SpO2:   [94 %-97 %] 97 %  BP: ()/(51-66) 100/57     Patient Vitals for the past 72 hrs (Last 3 readings):   Weight   06/23/22 0752 96.2 kg (212 lb 1.3 oz)   06/22/22 0930 96.7 kg (213 lb 3 oz)   06/21/22 2331 96.2 kg (212 lb)     Body mass index is 33.22 kg/m².      Intake/Output Summary (Last 24 hours) at 6/23/2022 1334  Last data filed at 6/23/2022 1153  Gross per 24 hour   Intake 924 ml   Output 1205 ml   Net -281 ml       Physical Exam  Constitutional:       Appearance: Normal appearance.   HENT:      Head: Normocephalic and atraumatic.   Neck:      Vascular: No JVD.      Comments: No JVD  Cardiovascular:      Rate and Rhythm: Normal rate and regular rhythm.      Pulses: Normal pulses.      Heart sounds: Normal heart sounds.   Pulmonary:      Effort: Pulmonary effort is normal.      Breath sounds: Normal breath sounds.   Abdominal:      General: Bowel sounds are normal. There is no distension.      Palpations: Abdomen is soft.      Tenderness: There is no abdominal tenderness.   Musculoskeletal:      Cervical back: Normal range of motion and neck supple.      Right lower leg: No edema.      Left lower leg: No edema.   Skin:     General: Skin is warm and dry.      Capillary Refill: Capillary refill takes less than 2 seconds.   Neurological:      General: No focal deficit present.      Mental Status: He is alert and oriented to person, place, and time.   Psychiatric:         Mood and Affect: Mood normal.         Behavior: Behavior normal.       Significant Labs:  CBC:  Recent Labs   Lab 06/17/22  1136 06/22/22 0056 06/23/22 0621   WBC 6.16 5.41 6.63   RBC 4.47* 4.10* 4.86   HGB 12.6* 11.7* 13.7*   HCT 39.7* 36.6* 42.4    210 275   MCV 89 89 87   MCH 28.2 28.5 28.2   MCHC 31.7* 32.0 32.3     BNP:  Recent Labs   Lab 06/17/22  1136 06/22/22  0056   * 111*     CMP:  Recent Labs   Lab 06/17/22  1136 06/22/22  0056 06/23/22 0621   * 148* 106   CALCIUM 8.9 8.9 9.4   ALBUMIN 3.7 3.6 3.9   PROT 6.6  6.3 7.0    139 137   K 4.6 4.2 4.2   CO2 19* 22* 23    106 105   BUN 27* 34* 35*   CREATININE 1.7* 1.9* 1.9*   ALKPHOS 80 67 74   ALT 17 12 16   AST 16 16 17   BILITOT 0.3 0.4 0.5      Coagulation:   Recent Labs   Lab 06/17/22  1136   INR 1.0     LDH:  No results for input(s): LDH in the last 72 hours.  Microbiology:  Microbiology Results (last 7 days)       Procedure Component Value Units Date/Time    Blood culture [[id number]] Collected: 06/22/22 0230    Order Status: Completed Specimen: Blood from Peripheral, Wrist, Right Updated: 06/23/22 0613     Blood Culture, Routine No Growth to date      No Growth to date    Blood culture [[id number]] Collected: 06/22/22 0245    Order Status: Completed Specimen: Blood from Peripheral, Wrist, Right Updated: 06/23/22 0613     Blood Culture, Routine No Growth to date      No Growth to date    Culture, Respiratory with Gram Stain [[id number]]     Order Status: No result Specimen: Respiratory             I have reviewed all pertinent labs within the past 24 hours.    Estimated Creatinine Clearance: 40 mL/min (A) (based on SCr of 1.9 mg/dL (H)).    Diagnostic Results:  I have reviewed all pertinent imaging results/findings within the past 24 hours.    Assessment and Plan:     No notes on file    * Coronary artery disease involving native coronary artery of native heart without angina pectoris  -[facility] with patent RIOS and non obstructive disease  -continue home meds: statin + ASA 81 mg    Cardiomyopathy, ischemic  -Continue entresto 49-51 mg BID + Jardiance 25mg+ aldactone 25+ Amiodarone 200 mg daily  - Appears warm and compensated on exam and euvolemic if not a little dry  -sCr stable at 1.9 w/ baseline around 1.5-1.7   -hold home oral lasix 40 (takes PRN at home)  -continue home dobutamine 2.5 mg daily via PICC  -TTE from 6/22 w/ 15% EF, normal RV function, 7.9 cm LVEDd.         Venkatesh Love PA-C  Heart Transplant  [facility] - Card[facility]

## 2022-06-23 NOTE — PLAN OF CARE
Notified CANDIDO Love pt switch to home dobutamine at 1538. Pt given discharge instruction, medication reviewed with the pt, follow up appts reviewed. All questions and concerns addressed. Pt verbalized understanding. IV, telemetry removed. Discharge paperwork given to patient. Pt now is on his home dobutamine pump, in room waiting for transport.

## 2022-06-23 NOTE — PROGRESS NOTES
Admit/Discharge Note     Met with patient to assess needs. Patient is a 72 y.o. single male, admitted for AF (atrial fibrillation) [I48.91], Chest pain [R07.9], CHF (congestive heart failure), NYHA class II, acute, systolic [I50.21] per medical record.      Patient admitted from ED on 6/21/2022.  At this time, patient presents as alert and oriented x 4, pleasant, good eye contact, calm, communicative, cooperative and asking and answering questions appropriately.  At this time, patients caregiver is not present.    Household/Family Systems     Patient resides with patient's sister and brother in law, at    [address]  [place] [address]     Pt shares a cell phone with his S/O, Pam Saucedo: [phone]    Additional Contacts:  Clarissa (sister): [phone] (pt reports this is either his brother in law's # or one of his son's.     Support system includes S/O (currently quarantining with COVID), sister, brother in law, sons. Patient does not have dependents that are need of being cared for.     Patients primary caregiver is self.    During admission, patient's caregiver plans to stay at home. Confirmed patient and patients caregivers do have access to reliable transportation. Pt's SO, sister and brother in law all drive. Pt is no longer driving.    Cognitive Status/Learning     Patient reports reading ability as college and states patient does have difficulty with seeing and hearing. Pt reports hearing loss and states he wears 225 strength OTC readers. Patient reports patient learns best by multiple methods.   Needed: No.   Highest education level: Attended College/Technical School    Vocation/Disability   .  Working for Income: No  If no, reason not working: Disability  Patient is disabled due to 4 broken ribs and first MI since 2005.      Adherence     Patient reports a high level of adherence to patients health care regimen.  Adherence counseling and education provided. Patient  verbalizes understanding.    Substance Use    Patient reports the following substance usage.    Tobacco: pt reports he smoked 2ppd for 45 years. No use for over 16 years.  Alcohol: none, patient denies any use.  Illicit Drugs/Non-prescribed Medications: none, patient denies any use.    Patient states clear understanding of the potential impact of substance use. Substance abstinence/cessation counseling, education and resources provided and reviewed.     Services Utilizing/ADLS    Infusion Service: Prior to admission, patient utilizing? yes, [facility] for .  Home Health: Prior to admission, patient utilizing? no. Pt may have had [facility] in the past but reports no HH at this time.  DME: Prior to admission, yes, O2, CPAP, BSC, walker and wheelchair (does not often use the latter two items).  Pulmonary/Cardiac Rehab: Prior to admission, no, pt reports he goes to outpt PT on [address] for back and neck issues.  Dialysis:  Prior to admission, no  Transplant Specialty Pharmacy:  Prior to admission, no.    Prior to admission, patient reports patient was independent with ADLS and was not driving.  Patient reports patient is able to care for self at this time..  Patient indicates a willingness to care for self once medically cleared to do so.    Insurance/Medications    Insured by   Payer/Plan Subscr  Sex Relation Sub. Ins. ID Effective Group Num   1. PATTI QUICK* PEPE LICONA * 1950 Male Self [id number]                                   [address]      Primary Insurance (for UNOS reporting): Public Insurance - Medicare FFS (Fee For Service)  Secondary Insurance (for UNOS reporting): None    Patient reports patient is able to obtain and afford medications at this time and at time of discharge.    Living Will/Healthcare Power of     Patient states patient does not have a LW and/or HCPA.  Pt reports he plans to work on this.  provided education regarding  LW and HCPA and the completion of forms.    Coping/Mental Health    Patient is coping well with the aid of  family members and friends. Patient denies mental health difficulties. General support provided.    Discharge Planning    At time of discharge, patient plans to return to patient's home under the care of self.  Patients harinder or SO's dgtr will transport patient.  Per rounds today, expected discharge date is today. Patient verbalizes understanding and are involved in treatment planning and discharge process.    Additional Concerns    Patient is being followed for needs, education, resources, information, emotional support, supportive counseling, and for supportive and skilled discharge plan of care.  providing ongoing psychosocial support, education, resources and d/c planning as needed.  SW remains available. Patient denies additional needs and/or concerns at this time. Patient verbalizes understanding and agreement with information reviewed, social work availability, and how to access available resources as needed.

## 2022-06-23 NOTE — PLAN OF CARE
Echo done. PICC line dressing changed per protocol ( see note). Continue on dobutamine gtt 2.5 mcg/kg/min. Pt educated on fall risk and remained free from falls/trauma/injury. Denies chest pain, SOB, palpitations, dizziness, pain, or discomfort. Plan of care reviewed with pt, all questions answered. Bed locked in lowest position, call bell within reach, no acute distress noted, will continue to monitor.

## 2022-06-23 NOTE — PLAN OF CARE
Pt maintained free from falls/trauma/injuries and skin breakdown. Pt c/o severe lower back pain and PRN Norco given. Pt's PICC line cannot draw back. Dobu going at 3.6 mL/hr. Plan of care reviewed. Pt verbalized understanding. All questions and concerns addressed. Will continue to monitor.

## 2022-06-23 NOTE — SUBJECTIVE & OBJECTIVE
Interval History: Admitted yesterday w/ fatigue symptoms w/ minimal activity. Is stable and appears euvolemic if not dry. Symptoms are improved with holding diuresis. Kidney function stable w/ sCr of 1.9    Continuous Infusions:   DOBUTamine IV infusion (non-titrating) 2.5 mcg/kg/min (06/22/22 0458)     Scheduled Meds:   allopurinoL  200 mg Oral Q48H    amiodarone  300 mg Oral Daily    aspirin  81 mg Oral Daily    atorvastatin  40 mg Oral QHS    enoxaparin  40 mg Subcutaneous Daily    mupirocin   Nasal BID    polyethylene glycol  17 g Oral Daily    sacubitriL-valsartan  1 tablet Oral BID    senna-docusate 8.6-50 mg  1 tablet Oral BID    spironolactone  25 mg Oral Daily     PRN Meds:acetaminophen, HYDROcodone-acetaminophen, melatonin, simethicone, sodium chloride 0.9%    Review of patient's allergies indicates:   Allergen Reactions    Iodine containing multivitamin Swelling     itching    Keflex [cephalexin] Swelling     Eyes.  Tolerated multiple doses of zosyn and 1 dose of augmentin in 2015 and 2016, respectively    Peaches [peach (prunus persica)] Swelling     eyes    Shellfish containing products Swelling    Fig tree Swelling     itching    Tuberculin spenser test ppd Rash     Objective:     Vital Signs (Most Recent):  Temp: 97.2 °F (36.2 °C) (06/23/22 1153)  Pulse: 72 (06/23/22 1200)  Resp: 18 (06/23/22 1153)  BP: (!) 100/57 (06/23/22 1153)  SpO2: 97 % (06/23/22 1234)   Vital Signs (24h Range):  Temp:  [97.2 °F (36.2 °C)-98.1 °F (36.7 °C)] 97.2 °F (36.2 °C)  Pulse:  [70-92] 72  Resp:  [18-20] 18  SpO2:  [94 %-97 %] 97 %  BP: ()/(51-66) 100/57     Patient Vitals for the past 72 hrs (Last 3 readings):   Weight   06/23/22 0752 96.2 kg (212 lb 1.3 oz)   06/22/22 0930 96.7 kg (213 lb 3 oz)   06/21/22 2331 96.2 kg (212 lb)     Body mass index is 33.22 kg/m².      Intake/Output Summary (Last 24 hours) at 6/23/2022 1334  Last data filed at 6/23/2022 1153  Gross per 24 hour   Intake 924 ml   Output 1205 ml   Net -281  ml       Physical Exam  Constitutional:       Appearance: Normal appearance.   HENT:      Head: Normocephalic and atraumatic.   Neck:      Vascular: No JVD.      Comments: No JVD  Cardiovascular:      Rate and Rhythm: Normal rate and regular rhythm.      Pulses: Normal pulses.      Heart sounds: Normal heart sounds.   Pulmonary:      Effort: Pulmonary effort is normal.      Breath sounds: Normal breath sounds.   Abdominal:      General: Bowel sounds are normal. There is no distension.      Palpations: Abdomen is soft.      Tenderness: There is no abdominal tenderness.   Musculoskeletal:      Cervical back: Normal range of motion and neck supple.      Right lower leg: No edema.      Left lower leg: No edema.   Skin:     General: Skin is warm and dry.      Capillary Refill: Capillary refill takes less than 2 seconds.   Neurological:      General: No focal deficit present.      Mental Status: He is alert and oriented to person, place, and time.   Psychiatric:         Mood and Affect: Mood normal.         Behavior: Behavior normal.       Significant Labs:  CBC:  Recent Labs   Lab 06/17/22  1136 06/22/22  0056 06/23/22  0621   WBC 6.16 5.41 6.63   RBC 4.47* 4.10* 4.86   HGB 12.6* 11.7* 13.7*   HCT 39.7* 36.6* 42.4    210 275   MCV 89 89 87   MCH 28.2 28.5 28.2   MCHC 31.7* 32.0 32.3     BNP:  Recent Labs   Lab 06/17/22  1136 06/22/22  0056   * 111*     CMP:  Recent Labs   Lab 06/17/22  1136 06/22/22  0056 06/23/22  0621   * 148* 106   CALCIUM 8.9 8.9 9.4   ALBUMIN 3.7 3.6 3.9   PROT 6.6 6.3 7.0    139 137   K 4.6 4.2 4.2   CO2 19* 22* 23    106 105   BUN 27* 34* 35*   CREATININE 1.7* 1.9* 1.9*   ALKPHOS 80 67 74   ALT 17 12 16   AST 16 16 17   BILITOT 0.3 0.4 0.5      Coagulation:   Recent Labs   Lab 06/17/22  1136   INR 1.0     LDH:  No results for input(s): LDH in the last 72 hours.  Microbiology:  Microbiology Results (last 7 days)       Procedure Component Value Units Date/Time     Blood culture [[id number]] Collected: 06/22/22 0230    Order Status: Completed Specimen: Blood from Peripheral, Wrist, Right Updated: 06/23/22 0613     Blood Culture, Routine No Growth to date      No Growth to date    Blood culture [[id number]] Collected: 06/22/22 0245    Order Status: Completed Specimen: Blood from Peripheral, Wrist, Right Updated: 06/23/22 0613     Blood Culture, Routine No Growth to date      No Growth to date    Culture, Respiratory with Gram Stain [[id number]]     Order Status: No result Specimen: Respiratory             I have reviewed all pertinent labs within the past 24 hours.    Estimated Creatinine Clearance: 40 mL/min (A) (based on SCr of 1.9 mg/dL (H)).    Diagnostic Results:  I have reviewed all pertinent imaging results/findings within the past 24 hours.

## 2022-06-23 NOTE — ASSESSMENT & PLAN NOTE
-Continue entresto 49-51 mg BID + Jardiance 25mg+ aldactone 25+ Amiodarone 200 mg daily  - Appears warm and compensated on exam and euvolemic if not a little dry  -sCr stable at 1.9 w/ baseline around 1.5-1.7   -hold home oral lasix 40 (takes PRN at home)  -continue home dobutamine 2.5 mg daily via PICC  -TTE from 6/22 w/ 15% EF, normal RV function, 7.9 cm LVEDd.

## 2022-06-26 NOTE — ED PROVIDER NOTES
Spoke to Mauro from [employer] and informed him that we were unable to troubleshoot the patient's infusion pump. He will send a new pump to patient home tomorrow morning.    The patients back-up infusion pump was correctly infusing dobutamine and we were advised that it was okay to send the patient home on the back up pump.      Adina Hudson, PharmD  [id number]

## 2022-06-26 NOTE — ED PROVIDER NOTES
Encounter Date: 6/25/2022       History     Chief Complaint   Patient presents with    Medication Refill     Pt states he ran out of dobutamine for his dobutamine pump. States he needs more. He has one bag that is currently infusing.      HPI     This is a 72-year-old man with a history of end-stage heart failure, with an EF of 15% on a continuous dobutamine infusion who presents with report of pump malfunction, and he was unable to get his backup pump working.  His main pump started working on arrival here, he is not sure what happened.  To clarify, the patient's triage note says that he ran out of dobutamine, which is incorrect, he has plenty of medication supply, it was just the pump that was not working.  He estimates that he spent about 20 minutes not on the medicine.  He feels fine, reports that he felt very anxious whenever his pump was not working but otherwise denies chest pain, shortness of breath, syncope or presyncope.  Review of patient's allergies indicates:   Allergen Reactions    Iodine containing multivitamin Swelling     itching    Keflex [cephalexin] Swelling     Eyes.  Tolerated multiple doses of zosyn and 1 dose of augmentin in 2015 and 2016, respectively    Peaches [peach (prunus persica)] Swelling     eyes    Shellfish containing products Swelling    Fig tree Swelling     itching    Tuberculin spenser test ppd Rash     Past Medical History:   Diagnosis Date    Acute hypoxemic respiratory failure 11/7/2015    Acute idiopathic gout of left knee 12/2/2019    Acute idiopathic gout of right elbow 9/23/2021    AICD (automatic cardioverter/defibrillator) present 12/13/2015      Anticoagulant long-term use     Bronchopneumonia 12/20/2021    CHF (congestive heart failure)     Chronic anticoagulation 5/5/2016    Chronic combined systolic and diastolic heart failure 11/26/2012    EF 10-20% on ECHO 2013    Chronic gout 12/2/2019    Clotting disorder     Coronary artery disease  involving native coronary artery of native heart without angina pectoris 11/26/2012    Cath 10- Stents patent non-obstructive disease Cath 11-12015 non-obstructive disease     COVID-19 08/2021    Had antibody infusion    Diverticulosis of colon     DVT (deep venous thrombosis), unspecified laterality 11/12/2015    Dysphonia 1/28/2018    Essential hypertension 11/15/2015    Fine motor impairment 2/2/2021    Hyperlipidemia     Hypertensive heart disease with heart failure 5/5/2016    MI (myocardial infarction) 2009    x's 5    Nicotine abuse     Obesity 11/26/2012    Olecranon bursitis of right elbow 9/19/2021    Pulmonary embolism 2011    Renal disorder     LAVERNE    Right carpal tunnel syndrome 4/6/2018    Stented coronary artery 11/26/2012    LAD stent placed 10/17/2007     Trigger thumb of right hand 4/6/2018     Past Surgical History:   Procedure Laterality Date    APPENDECTOMY      BACK SURGERY      CARDIAC DEFIBRILLATOR PLACEMENT      CARDIAC SURGERY  2007    stent    CARPAL TUNNEL RELEASE Right 04/2018    INSERTION OF BIVENTRICULAR IMPLANTABLE CARDIOVERTER-DEFIBRILLATOR (ICD) N/A 5/3/2019    Procedure: INSERTION, ICD, BIVENTRICULAR;  Surgeon: Teofilo Pal MD;  Location: [facility] EP LAB;  Service: Cardiology;  Laterality: N/A;  ICH CM,  ICD UPGD BI-V,  SJM, MAC, FAS, 3PREP (dual ICD INSITU)    r knee scope      REVISION OF SKIN POCKET FOR CARDIOVERTER-DEFIBRILLATOR Left 5/3/2019    Procedure: Revision, Skin Pocket, For Cardioverter-Defibrillator;  Surgeon: Teofilo Pal MD;  Location: [facility] EP LAB;  Service: Cardiology;  Laterality: Left;    RIGHT HEART CATHETERIZATION Right 6/14/2021    Procedure: INSERTION, CATHETER, RIGHT HEART;  Surgeon: Lizz Nieto MD;  Location: [facility] CATH LAB;  Service: Cardiology;  Laterality: Right;    RIGHT HEART CATHETERIZATION Right 6/17/2022    Procedure: INSERTION, CATHETER, RIGHT HEART;  Surgeon: Migue Henry Jr., MD;  Location:  [facility] CATH LAB;  Service: Cardiology;  Laterality: Right;    SPINE SURGERY      TONSILLECTOMY      TRIGGER FINGER RELEASE Right 2018    thumb     Family History   Problem Relation Age of Onset    Cancer Mother         colon cancer    Heart disease Mother     Diabetes Mother     Heart disease Father     Stroke Father     Colon polyps Father     Cancer Paternal Grandmother         leukemia    No Known Problems Sister     No Known Problems Daughter     No Known Problems Son     No Known Problems Sister     No Known Problems Son      Social History     Tobacco Use    Smoking status: Former Smoker     Packs/day: 1.00     Years: 45.00     Pack years: 45.00     Types: Cigarettes     Quit date: 2005     Years since quittin.5    Smokeless tobacco: Never Used    Tobacco comment: 1-1.5 ppd every day.   Substance Use Topics    Alcohol use: No    Drug use: No     Review of Systems   Constitutional: Negative for chills, diaphoresis, fatigue and fever.   HENT: Negative for congestion, rhinorrhea and sore throat.    Eyes: Negative for visual disturbance.   Respiratory: Negative for cough, chest tightness and shortness of breath.    Cardiovascular: Negative for chest pain.   Gastrointestinal: Negative for abdominal pain, blood in stool, constipation, diarrhea and vomiting.   Genitourinary: Negative for dysuria, hematuria and urgency.   Musculoskeletal: Negative for back pain.   Skin: Negative for rash.   Neurological: Negative for seizures and syncope.   Hematological: Does not bruise/bleed easily.   Psychiatric/Behavioral: Negative for agitation and hallucinations.       Physical Exam     Initial Vitals [22 2130]   BP Pulse Resp Temp SpO2   (!) 138/59 81 18 -- 96 %      MAP       --         Physical Exam  Gen: AxOx4, NAD, appears stated age, well appearing  Eye: EOMI, no scleral icterus, no periorbital edema or ecchymosis  Head: Normocephalic, atraumatic, no lesions, scalp grossly normal  ENT:  neck supple, no stridor, no masses, no abnormal phonation or drooling  CVS: warm and well perfused, cap refill <2 seconds, no extremity pallor  PULM: normal work of breathing, no stridor, equal chest rise, no peripheral cyanosis  ABD: scaphoid, nondistended  Ext: no rash, no deformities, moving all joints with normal ROM  Neuro: BRUNSON, gait intact, face grossly symmetric  Psych: well groomed, mood and affect congruent, makes good eye contact, cooperative    ED Course   Procedures  Labs Reviewed - No data to display       Imaging Results    None          Medications - No data to display  Medical Decision Making:   History:   Old Medical Records: I decided to obtain old medical records.  Old Records Summarized: records from clinic visits.  Initial Assessment:   This is a 72-year-old man who presents with problem with his dobutamine pump.  I discussed the patient with our ED pharmacologist who saw and evaluated him and help troubleshoot the pump with him and with the device wrap.  His main pump is still working normally, and he has enough medicine throughout the night, and the device company will deliver a new pump to his home in the morning, so that he will have a functional back up.  He knows to present to the nearest hospital if his current pump stopped working again, and he was discharged in stable condition.                      Clinical Impression:   Final diagnoses:  [Z45.1] Adjustment and management of infusion pump (Primary)          ED Disposition Condition    Discharge Stable        ED Prescriptions     None        Follow-up Information     Follow up With Specialties Details Why Contact Info    [facility] - Emergency Dept Emergency Medicine  If symptoms worsen [address]  [facility] [id number] [phone]           Lillian Choi MD  06/25/22 [id number]

## 2022-06-26 NOTE — CONSULTS
"TALIA contacted [facility] to inform them of the malfunction of this patient's primary machine. TALIA spoke with arlen that gave SW the number to the nursing agency "it has to be trouble shoot first". #[phone]. [facility]. Devaughn Luna is the machine. Pharmacy at this campus was given this information and is at patient's bedside in regards to this matter. However Arlen stated if [facility] cannot troubleshoot to fix the problem, then contact the number below so pharmacy can be called to see about getting the patient another machine.     Machine is stating that "The back door is open"    [facility]  Address: [address], [place] [address]  Hours:   Open 24 hours  Phone: [phone]    JHONY Gutierrez, MSW-LMSW  Medical Social Worker/  ER Department       "

## 2022-06-28 NOTE — TELEPHONE ENCOUNTER
I spoke with Mr Thad and verified that he is supposed to be taking Entresto 49-51.  He has not started higher dose, will start this evening.  Gets labs Friday at [facility].

## 2022-06-28 NOTE — TELEPHONE ENCOUNTER
----- Message from Angely Rock RN sent at 6/28/2022 10:52 AM CDT -----  Regarding: FW: Medication    ----- Message -----  From: Marlena Davis  Sent: 6/28/2022   8:24 AM CDT  To: [facility]  Subject: Medication                                       Pt [phone] would like a call in ref to his Entresto 49-51 mg, he thinks the dosage may be incorrect.    [id number]  [place] - [address] AT [facility] [address]   Phone:  [phone]  Fax:  [phone]      Thanks

## 2022-07-14 NOTE — TELEPHONE ENCOUNTER
I spoke with Mr Oneil today.  He missed his appt with [facility] this week and cancelled his c scope t his week.  Said he does not have the prep.  I let him know it looks like his prep was called to [facility] in May.  I gave him the number to reschedule his scope, and verbally instructed on all upcoming appts.  Additionally, I mailed new appt slips to [facility] address.

## 2022-07-14 NOTE — TELEPHONE ENCOUNTER
Patient did not receive a prescription for colonoscopy prep. He would like to reschedule his procedure. Will route message to the provider. Advised the patient to call back with any further questions.    Reason for Disposition   Question about upcoming scheduled test, no triage required and triager able to answer question    Protocols used: INFORMATION ONLY CALL - NO TRIAGE-A-

## 2022-07-21 NOTE — PROGRESS NOTES
Consult Note  Palliative Medicine Clinic      Consult Requested By: Dr. Edison Marshall    Primary Care Physician: Primary Doctor No    Reason for Consult: Advance Care Planning in the setting of LVAD      ASSESSMENT/PLAN:     Plan/Recommendations:  Diagnoses and all orders for this visit:        Chronic combined systolic and diastolic congestive heart failure  -  Ambulatory referral/consult to [facility] Palliative Care  - previously refused LVAD but has reconsidered since hospitalization January 2022  -Now hoping to get an LVAD- his case was deferred pending evaluation of pancreatic mass.  -currently on home   -No dyspnea, some fatigue as below      Fatigue, unspecified type  -Unable to be as active as before   -Likely related to heart failure  -Discussed graded exercise  -Pt hoping for an LVAD       Pain  -Following with pain management   -Taking Norco 10-325mg q 4h PRN         Quality of life palliative care encounter    Medicolegal: Has decision making capacity.  sons are surrogate decision maker.   Wants to name his SO and his youngest son as HCPOA.     Psychosocial:  support system consists of significant other and sons as well as extended family     Spiritual: Druze    Prognostication: Patient with combined HF on home  undergoing evaluation for DT LVAD- prognosis is poor     Understanding of disease and Illness Trajectory: Patient  has  adequate understanding of his illness, they can benefit from continued education on what to expect in the future.      Goals of care:    Advance Care Planning     Date: 07/21/2022    I initiated the process of advance care planning today and explained the importance of this process to the patient.  I introduced the concept of advance directives to the patient, as well.     The patient received detailed information about the importance of designating a Health Care Power of  (HCPOA). He was also instructed to communicate with this person about their wishes for future  healthcare, should he become sick and lose decision-making capacity. The patient has not previously appointed a HCPOA, he is thinking of naming his significant other Pam Saucedo and his youngest son, however he wants to talk to his son prior to filling out the paperwork.      We specifically addressed what the goals of care would be moving forward, in light of the patient's change in clinical status, specifically Heart failure on .  We did specifically address the patient's likely prognosis, which is poor w/o and LVAD. He knows that his time is short unless he receives an LVAD.  We explored the patient's values and preferences for future care.  The patient endorses that what is most important right now is to focus on curative/life-prolongation (regardless of treatment burdens). He states that his main goal is to prolong his life to be able to enjoy his loved ones, he understands that with an LVAD he will have some burdens however after his recent experience his main goal has changed to life prolongation. He understands that there are possible complications and that his life will not be entirely normal, and he accepts all of this because he hopes to be able to live longer.    Accordingly, we have decided that the best plan to meet the patient's goals includes continuing with treatment and LVAD workup          Code status: Full Code     Advance directives:none, HCPOA paperwork provided      Summary and recommendations:  -Patient was encouraged to discuss with son amanda BRAGA, discuss his wishes with family       20 min time was spent on advance care planning, goals of care discussion, emotional support, formulating and communicating prognosis and goals of care, exploring burden/benefit of various approaches of treatment.        Follow up: 3m    Plan discussed with referring physician     SUBJECTIVE:     History obtained from: William Avila complaint: No chief complaint on file.        History of  Present Illness:  Mr. Audrey Oneil Jr. is 72 y.o. year old male presenting with Heart failure.  Referred to Palliative Care for evaluation and management of physical symptoms and clarification of goals of care. He attended the appointment with his SO      Interval History:    Patient states that he has been feeling more fatigued lately.    He shared that in the past he had declined LVAD and now he is considering it more     He endorsed pain in his neck and R leg and L arm, burning pain, he takes Norco q 4h PRN, not taking percocet    Fatigue- has been very fatigued lately unable to do his usual activities       Disease History:  ICM (EF 15%) on home  2.5 (previously refused LVAD, but now amenable) s/p [facility] CRT-D 5/2019  VT, HTN, HLD  CAD s/p MI     Previous experience or exposure to a serious illness: (January 2022 for recurrent VT (he thinks had MI) and cardiogenic shock )  6m ago he had a cardiac arrest and was placed on a ventilator he states he was in coma for some time and then he was transferred to the palliative floor on [facility] but his family advocated for more care and he was transfererd out.     Past Medical History:   Diagnosis Date    Acute hypoxemic respiratory failure 11/7/2015    Acute idiopathic gout of left knee 12/2/2019    Acute idiopathic gout of right elbow 9/23/2021    AICD (automatic cardioverter/defibrillator) present 12/13/2015      Anticoagulant long-term use     Bronchopneumonia 12/20/2021    CHF (congestive heart failure)     Chronic anticoagulation 5/5/2016    Chronic combined systolic and diastolic heart failure 11/26/2012    EF 10-20% on ECHO 2013    Chronic gout 12/2/2019    Clotting disorder     Coronary artery disease involving native coronary artery of native heart without angina pectoris 11/26/2012    Cath 10- Stents patent non-obstructive disease Cath 11-12015 non-obstructive disease     COVID-19 08/2021    Had antibody infusion     Diverticulosis of colon     DVT (deep venous thrombosis), unspecified laterality 11/12/2015    Dysphonia 1/28/2018    Essential hypertension 11/15/2015    Fine motor impairment 2/2/2021    Hyperlipidemia     Hypertensive heart disease with heart failure 5/5/2016    MI (myocardial infarction) 2009    x's 5    Nicotine abuse     Obesity 11/26/2012    Olecranon bursitis of right elbow 9/19/2021    Pulmonary embolism 2011    Renal disorder     LAVERNE    Right carpal tunnel syndrome 4/6/2018    Stented coronary artery 11/26/2012    LAD stent placed 10/17/2007     Trigger thumb of right hand 4/6/2018     Past Surgical History:   Procedure Laterality Date    APPENDECTOMY      BACK SURGERY      CARDIAC DEFIBRILLATOR PLACEMENT      CARDIAC SURGERY  2007    stent    CARPAL TUNNEL RELEASE Right 04/2018    INSERTION OF BIVENTRICULAR IMPLANTABLE CARDIOVERTER-DEFIBRILLATOR (ICD) N/A 5/3/2019    Procedure: INSERTION, ICD, BIVENTRICULAR;  Surgeon: Teofilo Pal MD;  Location: [facility] EP LAB;  Service: Cardiology;  Laterality: N/A;  ICH CM,  ICD UPGD BI-V,  SJM, MAC, FAS, 3PREP (dual ICD INSITU)    r knee scope      REVISION OF SKIN POCKET FOR CARDIOVERTER-DEFIBRILLATOR Left 5/3/2019    Procedure: Revision, Skin Pocket, For Cardioverter-Defibrillator;  Surgeon: Teofilo Pal MD;  Location: [facility] EP LAB;  Service: Cardiology;  Laterality: Left;    RIGHT HEART CATHETERIZATION Right 6/14/2021    Procedure: INSERTION, CATHETER, RIGHT HEART;  Surgeon: Lizz Nieto MD;  Location: [facility] CATH LAB;  Service: Cardiology;  Laterality: Right;    RIGHT HEART CATHETERIZATION Right 6/17/2022    Procedure: INSERTION, CATHETER, RIGHT HEART;  Surgeon: Migue Henry Jr., MD;  Location: [facility] CATH LAB;  Service: Cardiology;  Laterality: Right;    SPINE SURGERY      TONSILLECTOMY      TRIGGER FINGER RELEASE Right 04/2018    thumb     Family History   Problem Relation Age of Onset    Cancer Mother         colon  cancer    Heart disease Mother     Diabetes Mother     Heart disease Father     Stroke Father     Colon polyps Father     Cancer Paternal Grandmother         leukemia    No Known Problems Sister     No Known Problems Daughter     No Known Problems Son     No Known Problems Sister     No Known Problems Son      Review of patient's allergies indicates:   Allergen Reactions    Iodine containing multivitamin Swelling     itching    Keflex [cephalexin] Swelling     Eyes.  Tolerated multiple doses of zosyn and 1 dose of augmentin in 2015 and 2016, respectively    Peaches [peach (prunus persica)] Swelling     eyes    Shellfish containing products Swelling    Fig tree Swelling     itching    Tuberculin spenser test ppd Rash       Medications:    Current Outpatient Medications:     allopurinoL (ZYLOPRIM) 100 MG tablet, Take 2 tablets (200 mg total) by mouth once daily., Disp: 60 tablet, Rfl: 0    amiodarone (PACERONE) 200 MG Tab, TAKE 1 AND 1/2 TABLETS(300 MG) BY MOUTH EVERY DAY, Disp: 135 tablet, Rfl: 3    aspirin (ECOTRIN) 81 MG EC tablet, Take 1 tablet (81 mg total) by mouth once daily., Disp: 90 tablet, Rfl: 3    atorvastatin (LIPITOR) 80 MG tablet, Take 1 tablet (80 mg total) by mouth once daily., Disp: 90 tablet, Rfl: 1    DOBUTamine (DOBUTREX) 500 mg/250 mL (2,000 mcg/mL), 2.5 mcg/kg/min Patient is 95.7 kg, Disp: 250 mL, Rfl: 5    furosemide (LASIX) 40 MG tablet, Take 1 tablet (40 mg total) by mouth daily as needed (Weight gain of 3 lbs in one day or 5 lbs in one week)., Disp: 30 tablet, Rfl: 11    ondansetron (ZOFRAN-ODT) 4 MG TbDL, Dissolve 1 tablet (4 mg total) by mouth every 6 (six) hours as needed (nausea)., Disp: 30 tablet, Rfl: 1    sacubitriL-valsartan (ENTRESTO) 49-51 mg per tablet, Take 1 tablet by mouth 2 (two) times daily., Disp: 60 tablet, Rfl: 3    simethicone (MYLICON) 80 MG chewable tablet, Take 1 tablet (80 mg total) by mouth every 6 (six) hours as needed for Flatulence., Disp:  60 tablet, Rfl: 1    spironolactone (ALDACTONE) 25 MG tablet, Take 1 tablet (25 mg total) by mouth once daily., Disp: 30 tablet, Rfl: 3    HYDROcodone-acetaminophen (NORCO)  mg per tablet, Take 1 tablet by mouth every 6 (six) hours., Disp: , Rfl:     JARDIANCE 25 mg tablet, Take 25 mg by mouth once daily., Disp: , Rfl:      database queried on 07/26/2022  by Basia Prater . The results reviewed and considered with the clinical data in the decision whether or not to prescribe a controlled substance.     07/07/2022 07/05/2022   1  Hydrocodone-Acetamin  Mg   120.00  30  Lauren Irving  [id number]  Wal (2900)  0  40.00 MME  [place]     06/07/2022 04/08/2022   1  Hydrocodone-Acetamin  Mg   120.00  30  Lauren Irving  [id number]  Wal (2900)  0  40.00 MME  [place]     05/08/2022 04/08/2022   1  Hydrocodone-Acetamin  Mg   120.00  30  Lauren Irving                OBJECTIVE:       ROS:  Review of Systems   Constitutional: Negative for activity change, appetite change and fatigue.   HENT: Negative for hearing loss and sore throat.    Eyes: Negative for visual disturbance.   Respiratory: Negative for shortness of breath.    Cardiovascular: Negative for chest pain.   Gastrointestinal: Negative for constipation, diarrhea and nausea.   Genitourinary: Negative for dysuria.   Musculoskeletal: Positive for neck pain. Negative for back pain and gait problem.   Neurological: Negative for headaches and memory loss.   Psychiatric/Behavioral: Negative for confusion. The patient is not nervous/anxious.          Review of Symptoms    Symptom Assessment (ESAS 0-10 Scale)  Pain:  9  Dyspnea:  0  Anxiety:  0  Nausea:  0  Depression:  0  Anorexia:  0  Fatigue:  8  Insomnia:  0  Restlessness:  0  Agitation:  0     CAM / Delirium:  Negative  Constipation:  Negative  Diarrhea:  Negative    Pain Assessment:  Location(s): back    Back       Location: lower        Quality: aching and burning        Quantity: 9/10 in intensity         Chronicity: Onset 2 year(s) ago, stable        Aggravating Factors: walking        Alleviating Factors: recumbency       Associated Symptoms: none    Performance Status:  70    Living Arrangements:  Lives with family    Psychosocial/Cultural: Lives with significant other Pam and sometimes he stays with his brother in law and his nephew. He has 2 sons.    Spiritual:  F - Lynnette and Belief:  Voodoo  I - Importance:  Moderate      Advance Care Planning   Advance Directives:   Living Will: No    LaPOST: No    Do Not Resuscitate Status: No    Medical Power of : No    Agent's Name:  Pma Saucedo   Agent's Contact Number:  [phone]    Decision Making:  Patient answered questions              Physical Exam:  Vitals: Pulse: 77 (07/21/22 0932)  BP: 104/65 (07/21/22 0932)  Physical Exam  Constitutional:       General: He is not in acute distress.  HENT:      Head: Normocephalic and atraumatic.   Eyes:      General: No scleral icterus.  Cardiovascular:      Comments: On  drip  Pulmonary:      Effort: Pulmonary effort is normal. No respiratory distress.   Abdominal:      General: Abdomen is protuberant. There is no distension.   Musculoskeletal:      Cervical back: Neck supple.   Skin:     Findings: No rash.      Comments: RUE PICC line in place    Neurological:      Mental Status: He is alert and oriented to person, place, and time.   Psychiatric:         Mood and Affect: Mood and affect normal.           Labs:  CBC:   WBC   Date Value Ref Range Status   07/22/2022 5.44 3.90 - 12.70 K/uL Final       Hemoglobin   Date Value Ref Range Status   07/22/2022 12.0 (L) 14.0 - 18.0 g/dL Final       POC Hematocrit   Date Value Ref Range Status   04/25/2022 42 36 - 54 %PCV Final     Hematocrit   Date Value Ref Range Status   07/22/2022 37.7 (L) 40.0 - 54.0 % Final       MCV   Date Value Ref Range Status   07/22/2022 89 82 - 98 fL Final       Platelets   Date Value Ref Range Status   07/22/2022 206 150 - 450 K/uL  Final           LFT:   Lab Results   Component Value Date    AST 19 07/22/2022    ALKPHOS 74 07/22/2022    BILITOT 0.4 07/22/2022       Albumin:   Albumin   Date Value Ref Range Status   07/22/2022 3.7 3.5 - 5.2 g/dL Final     Protein:   Total Protein   Date Value Ref Range Status   07/22/2022 6.3 6.0 - 8.4 g/dL Final       Radiology:I have reviewed all pertinent imaging results/findings within the past 24 hours.     Results for orders placed during the hospital encounter of 06/21/22  Echo  Interpretation Summary  · The left ventricle is severely enlarged with eccentric hypertrophy and severely decreased systolic function. The estimated ejection fraction is 15%.  · There is severe left ventricular global hypokinesis.  · Normal right ventricular size with normal right ventricular systolic function.  · Grade I left ventricular diastolic dysfunction.  · Mild left atrial enlargement.  · Low central venous pressure.  · The TR jet is not visualized and PASP cannot be estimated.        20  minutes spent in discussing ACP    This note was partially created using [employer] Voice Recognition software. Typographical and content errors may occur with this process. While efforts are made to detect and correct such errors, in some cases errors will persist. For this reason, wording in this document should be considered in the proper context and not strictly verbatim.      Encounter occurred during period of COVID-19 emergency. Encounter performed under the concurrent guidelines, limitations and protocols.      Signature: Basia Prater MD

## 2022-07-21 NOTE — PROGRESS NOTES
[facility]  Palliative Care   Psychosocial Assessment    Patient Name: Audrey Oneil Jr.  MRN: [id number]  Palliative Care Provider: Basia Prater MD   Primary Care Physician: Primary Doctor No  Principal Problem: CHF     Reason for Referral: Advanced Care Planning and Psychosocial Support       Present during Interview: patient and spouse/SO.      Primary Language:English   Needed: no      Past Medical Situation:   PMH:   Past Medical History:   Diagnosis Date    Acute hypoxemic respiratory failure 11/7/2015    Acute idiopathic gout of left knee 12/2/2019    Acute idiopathic gout of right elbow 9/23/2021    AICD (automatic cardioverter/defibrillator) present 12/13/2015      Anticoagulant long-term use     Bronchopneumonia 12/20/2021    CHF (congestive heart failure)     Chronic anticoagulation 5/5/2016    Chronic combined systolic and diastolic heart failure 11/26/2012    EF 10-20% on ECHO 2013    Chronic gout 12/2/2019    Clotting disorder     Coronary artery disease involving native coronary artery of native heart without angina pectoris 11/26/2012    Cath 10- Stents patent non-obstructive disease Cath 11-12015 non-obstructive disease     COVID-19 08/2021    Had antibody infusion    Diverticulosis of colon     DVT (deep venous thrombosis), unspecified laterality 11/12/2015    Dysphonia 1/28/2018    Essential hypertension 11/15/2015    Fine motor impairment 2/2/2021    Hyperlipidemia     Hypertensive heart disease with heart failure 5/5/2016    MI (myocardial infarction) 2009    x's 5    Nicotine abuse     Obesity 11/26/2012    Olecranon bursitis of right elbow 9/19/2021    Pulmonary embolism 2011    Renal disorder     LAVERNE    Right carpal tunnel syndrome 4/6/2018    Stented coronary artery 11/26/2012    LAD stent placed 10/17/2007     Trigger thumb of right hand 4/6/2018     Mental Health/Substance Use History: None  identified   Risk of Abuse, neglect or exploitation: None identified   Current or Previous Trauma and/or evidence of PTSD: Patient recounts an extensive hospitalization six months ago in which he was placed on comfort measures.   Non-traditional Health practices: none identified     Understanding of diagnosis and prognosis: Fair   Experience/Comfort level with health care system: Fair     Patients Mental Status: Alert and oriented to person, place, time and situation with stable mood     Socio-Economic Factors/Resources:  Address: [facility] [place] [address]  Phone Number: [phone] (home) [phone] (work)    Marital Status: Single  Household composition: patient and significant other   Children: Two sons     Patient/Family perceptions about Caregiving Needs; availability and capacity: patient's significant other appears willing to assist patient with care as needs advance     Family Dynamics/Relationships:Healthy     Patient/Family Strengths/Resilience: Patient has employed modifications to his lifestyle in order to engage in hobbies within the constraints of his health restrictions.  Patient has endured recent traumatic hospitalization and appears motivated to follow treatment plan with the goal of receiving LVAD.   Patient/Family Coping: Appropriately     Activities of Daily Living: Independent   Support Systems-Family & Community (Home Health, HME etc): n/a     Transportation:  yes    Work/Education History: disabled   Self-Care Activities/Hobbies: Scrapping, searching for rocks, fishing      History: no    Financial Resources:Medicare      Advanced Care Planning & Legal Concerns:   Advanced Directives/Living Will: no  LaPOST/POLST: no   Planning:  no    Power of : no    Emergency Contacts: Significant other/Pam Saucedo     Spirituality, Culture & Coping Mechanisms:  F- Lynnette and Belief: Druze     I - Importance: Moderate     C - Community/Culture Values: Patient prays  regularly and prefers independent practice.      A - Address in Care: Patient wishes to see  prior to end of life.       Goals/Hopes/Expectations: Patient hopes to prolong his life and travel.   Fears/Anxiety/Concerns: Patient endorces chronic pain and fatigue.       Complicated Bereavement Risk Assessment Tool (CBRAT)  Reference:  [facility] Clinical Practice Group (May 2016). Bereavement Risk Screening and Management Guidelines.  Retrieved from: http://www.M-DISCcc.com.au/wp-content/uploads//QCQOV-Ufbkaurslyp-Ywymmsauc-and-Management-Guideline-2016.pdf      Bereaved Client Characteristics   ? Under 18      no  ? Was a Twin   no  ? Young Spouse   no  ? Elderly Spouse    no  ? Isolated    no  ? Lacks Meaningful Social Support   no  ? Dissatisfied with help available during illness   no  ? New to Financial [place] no  ? New to Decision-Making   no    Illness  ? Inherited Disorder   no  ? Stigmatized Disease in the family/community   no  ? Lengthy/Burdensome   no     Bereaved Client's History of Loss   ? Cumulative Multiple Losses   no  ? Previous Mental Health Illnesses   no  ? Current Mental Health Illness   yes  ? Other Significant Health Issues   no   ? Migrant/Refugee   no Death  ? Sudden or Unexpected   no  ? Traumatic Circumstances Associated with Death   no  ? Significant Cultural/Social Burdens as a result of Death   yes   Relationship with   ? Profound Lifelong Partner   yes  ? Highly Dependent    no  ? Antagonistic   no  ? Ambivalent   no  ? Deeply Connected   yes  ? Culturally Defined   yes   Risk Factors Scores  0-2  Low  3-5  Moderate  5+  High  All persons scoring moderate to high presume to be at risk**    (** It is acknowledged that protective factors and resilience may outweigh apparent risk factors.      Total Risk Factors Score:   Moderate     SW accompanied MD during initial visit with patient and significant other.  Patient  "presents AAOx4 with stable mood.  Patient exhibits a fair understanding of his illness.  Patient reports he hopes to prolong his life and travel. Patient denies anxiety and depression and reports he is "accepting" of his condition.  Patient reports he was initially concerned with the surgery and subsequent life adjustments following LVAD placement.  Patient notes he has reconsidered his previous decision and now hopes to be approved for this device.  Patient acknowledges the adjustments he must make in order to meet this goal. MD explored patient's goals of care further. Patient notes he intends to list his youngest son and significant other as HCPOA.  Patient notes he would be willing to receive all invasive treatments with the intent of living as long as possible. 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  Patient reports his family was informed his death was imminent however family requested resumption of invasive treatments. Patient subsequently recovered. The outcome of this event may persuade future decisions made by patient/family regarding EoL care.  This team remains available to provide emotional support and psychosocial assistance. SW will continue to follow.    Felicia Benoit [facility]  Outpatient   Palliative Medicine                    "

## 2022-07-22 PROBLEM — A49.8 CLOSTRIDIUM DIFFICILE INFECTION: Status: RESOLVED | Noted: 2022-01-01 | Resolved: 2022-01-01

## 2022-07-22 PROBLEM — B96.20 E. COLI UTI: Status: RESOLVED | Noted: 2022-01-01 | Resolved: 2022-01-01

## 2022-07-22 PROBLEM — N17.9 AKI (ACUTE KIDNEY INJURY): Status: RESOLVED | Noted: 2021-01-22 | Resolved: 2022-01-01

## 2022-07-22 PROBLEM — I10 ESSENTIAL HYPERTENSION: Chronic | Status: RESOLVED | Noted: 2021-01-20 | Resolved: 2022-01-01

## 2022-07-22 PROBLEM — N39.0 E. COLI UTI: Status: RESOLVED | Noted: 2022-01-01 | Resolved: 2022-01-01

## 2022-07-22 NOTE — PROGRESS NOTES
Subjective:   Transplant status: active    HPI:  Mr. Oneil is a 72 y.o. year old White male who has presented to be evaluated as a potential heart transplant recipient.      73 yo WM last seen 5/18/22  previously refused LVAD but has reconsidered since hospitalization January 2022 for recurrent VT (he thinks had MI) and cardiogenic shock for which hosp at [facility].  He changed his mind about LVAD while in extremis at [facility] and I accepted in transfer.  While awaiting hospital bed he was transferred to [facility] and was treated for cardiogenic shock there and subsequently sent home on .  He remains on  and is pursuing evaluation for LVAD.  He reports being told at [facility] that they could not place IABP due to aneurysm but our CT abd and pelvis 4/6/22 and report specifically says no aneurysm.     He has had a couple of observation admissions where he was told to be dry last visit June 2022. He cannot tolerate higher doses of meds due to symptomatic hypotension.  He still notes occasional dizzy spells when he 1st stands but there are not too bad at this time.  Uses Lasix only as needed targeting his home weight 218-220.  I feel that he has continued to gain true body as his baseline dry weight at home previously had been lower to remember to 211-212 lb at the May 2022 visit and that was up from 207-208 lb.  On our scale today 210# and at prior visit here 194#--he has reversed his weight loss--based upon exam this is not fluid--going to [facility] after visit for routine labs and dressing change    His case was recently discussed at selection committee and he was deferred pending evaluation of pancreatic mass.    He reports his heart failure symptoms are stable.  As long as he paces himself can walk without significant shortness of breath.  Is not experiencing orthopnea PND.  No symptomatic arrhythmia.       Past Medical History:   Diagnosis Date    Acute hypoxemic respiratory failure 11/7/2015    Acute idiopathic gout of  left knee 12/2/2019    Acute idiopathic gout of right elbow 9/23/2021    AICD (automatic cardioverter/defibrillator) present 12/13/2015      Anticoagulant long-term use     Bronchopneumonia 12/20/2021    CHF (congestive heart failure)     Chronic anticoagulation 5/5/2016    Chronic combined systolic and diastolic heart failure 11/26/2012    EF 10-20% on ECHO 2013    Chronic gout 12/2/2019    Clotting disorder     Coronary artery disease involving native coronary artery of native heart without angina pectoris 11/26/2012    Cath 10- Stents patent non-obstructive disease Cath 11-12015 non-obstructive disease     COVID-19 08/2021    Had antibody infusion    Diverticulosis of colon     DVT (deep venous thrombosis), unspecified laterality 11/12/2015    Dysphonia 1/28/2018    Essential hypertension 11/15/2015    Fine motor impairment 2/2/2021    Hyperlipidemia     Hypertensive heart disease with heart failure 5/5/2016    MI (myocardial infarction) 2009    x's 5    Nicotine abuse     Obesity 11/26/2012    Olecranon bursitis of right elbow 9/19/2021    Pulmonary embolism 2011    Renal disorder     LAVERNE    Right carpal tunnel syndrome 4/6/2018    Stented coronary artery 11/26/2012    LAD stent placed 10/17/2007     Trigger thumb of right hand 4/6/2018     Past Surgical History:   Procedure Laterality Date    APPENDECTOMY      BACK SURGERY      CARDIAC DEFIBRILLATOR PLACEMENT      CARDIAC SURGERY  2007    stent    CARPAL TUNNEL RELEASE Right 04/2018    INSERTION OF BIVENTRICULAR IMPLANTABLE CARDIOVERTER-DEFIBRILLATOR (ICD) N/A 5/3/2019    Procedure: INSERTION, ICD, BIVENTRICULAR;  Surgeon: Teofilo Pal MD;  Location: [facility];  Service: Cardiology;  Laterality: N/A;  ICH CM,  ICD UPGD BI-V,  SJM, MAC, FAS, 3PREP (dual ICD INSITU)    r knee scope      REVISION OF SKIN POCKET FOR CARDIOVERTER-DEFIBRILLATOR Left 5/3/2019    Procedure: Revision, Skin Pocket, For  "Cardioverter-Defibrillator;  Surgeon: Teofilo Pal MD;  Location: [facility] EP LAB;  Service: Cardiology;  Laterality: Left;    RIGHT HEART CATHETERIZATION Right 6/14/2021    Procedure: INSERTION, CATHETER, RIGHT HEART;  Surgeon: Lizz Nieto MD;  Location: [facility] CATH LAB;  Service: Cardiology;  Laterality: Right;    RIGHT HEART CATHETERIZATION Right 6/17/2022    Procedure: INSERTION, CATHETER, RIGHT HEART;  Surgeon: Migue Henry Jr., MD;  Location: [facility] CATH LAB;  Service: Cardiology;  Laterality: Right;    SPINE SURGERY      TONSILLECTOMY      TRIGGER FINGER RELEASE Right 04/2018    thumb       Review of Systems   Constitutional: Positive for malaise/fatigue and weight gain. Negative for chills, fever and night sweats.   Cardiovascular: Positive for dyspnea on exertion. Negative for chest pain, irregular heartbeat, leg swelling, near-syncope, orthopnea, palpitations, paroxysmal nocturnal dyspnea and syncope.   Respiratory: Positive for cough, sleep disturbances due to breathing (intolerant to CPAP), snoring and sputum production. Negative for wheezing.    Hematologic/Lymphatic: Bruises/bleeds easily ( bruising).   Musculoskeletal: Positive for back pain, joint pain, muscle weakness and stiffness. Negative for arthritis.   Gastrointestinal: Negative for change in bowel habit, hematochezia and melena.   Genitourinary: Negative for hematuria.   Neurological: Positive for dizziness, light-headedness, numbness (He reports that since his cardiogenic shock admission to turn 0 he has some numbness of the right foot and leg as well as the left arm.  Occasionally have sharp shooting pains in the right leg.) and weakness. Negative for brief paralysis, focal weakness and seizures.     Objective:   Blood pressure (!) 97/50, pulse 78, height 5' 7" (1.702 m), weight 99.8 kg (220 lb).body mass index is 34.46 kg/m².  Physical Exam  Constitutional:       General: He is not in acute distress.     Appearance: He is " "well-developed. He is not diaphoretic.      Comments: BP (!) 97/50   Pulse 78   Ht 5' 7" (1.702 m)   Wt 99.8 kg (220 lb)   BMI 34.46 kg/m²   Last visit here 194#--he has reversed his weight loss--based upon exam this is not fluid--going to [facility] after visit for routine labs and dressing change  Obese WM in NAD   HENT:      Head: Normocephalic and atraumatic.   Eyes:      General: No scleral icterus.        Right eye: No discharge.         Left eye: No discharge.      Conjunctiva/sclera: Conjunctivae normal.   Neck:      Thyroid: No thyromegaly.      Vascular: No JVD (below 10 cm).   Cardiovascular:      Rate and Rhythm: Normal rate and regular rhythm.      Heart sounds: Murmur (Grade 1/6 systolic murmur base and LSB) heard.     No gallop.   Pulmonary:      Effort: Pulmonary effort is normal.      Breath sounds: Normal breath sounds.   Abdominal:      General: Bowel sounds are normal. There is no distension.      Palpations: Abdomen is soft. There is no hepatomegaly (liver span 12 cm).      Tenderness: There is no abdominal tenderness.   Musculoskeletal:         General: No swelling or tenderness.      Right lower leg: No edema.      Left lower leg: No edema.   Skin:     General: Skin is warm and dry.   Neurological:      General: No focal deficit present.      Mental Status: He is alert and oriented to person, place, and time. Mental status is at baseline.      Motor: No weakness.   Psychiatric:         Mood and Affect: Mood normal.         Behavior: Behavior normal.         Thought Content: Thought content normal.         Judgment: Judgment normal.       Lab Results   Component Value Date     (H) 07/08/2022     07/08/2022    K 4.5 07/08/2022    MG 2.0 07/08/2022     (H) 07/08/2022    CO2 19 (L) 07/08/2022    PHOS 4.2 05/28/2022    PHOS 4.1 05/28/2022    BUN 32 (H) 07/08/2022    CREATININE 1.7 (H) 07/08/2022     (H) 07/08/2022    HGBA1C 5.6 05/28/2022    AST 19 07/08/2022    ALT 20 " 07/08/2022    ALBUMIN 3.8 07/08/2022    PROT 6.7 07/08/2022    BILITOT 0.4 07/08/2022    WBC 5.44 07/22/2022    HGB 12.0 (L) 07/22/2022    HCT 37.7 (L) 07/22/2022    HCT 42 04/25/2022     07/22/2022    INR 1.0 06/17/2022    INR 2.3 (H) 12/29/2011     05/18/2022    TSH 2.834 06/22/2022    FREET4 0.99 06/22/2022    CHOL 145 05/18/2022    HDL 50 05/18/2022    LDLCALC 57.8 (L) 05/18/2022    TRIG 186 (H) 05/18/2022     Assessment:      1. Chronic combined systolic and diastolic heart failure    2. Cardiomyopathy, ischemic    3. Thoracic aortic atherosclerosis    4. LBBB (left bundle branch block)    5. Hypertensive cardiovascular-renal disease, stage 1-4 or unspecified chronic kidney disease, with heart failure    6. Coronary artery disease involving native coronary artery of native heart without angina pectoris    7. Ventricular tachycardia    8. Presence of cardiac resynchronization therapy defibrillator (CRT-D)    9. Obesity (BMI 30.0-34.9)    10. Long term current use of amiodarone    11. Mixed hyperlipidemia        Plan:   Track labs from [facility] to be drawn later today    Patient is now on home  functions as NYHA Class 3  Recommend 2 gram sodium restriction and 1500cc fluid restriction.  Encourage physical activity with graded exercise program.  Requested patient to weigh themselves daily, and to notify us if their weight increases by more than 3 lbs in 1 day or 5 lbs in 1 week.     Listed for transplant: he is not a transplant candidate; under consideration for DT LVAD    Patient advised that it is recommended that all transplant candidates, and their close contacts and household members receive Covid vaccination.    UNOS Patient Status  Functional Status: 70% - Cares for self: unable to carry on normal activity or active work  Physical Capacity: No Limitations  Working for Income: No  If no, reason not working: Disability

## 2022-07-22 NOTE — LETTER
July 22, 2022        Isiah Adrian Jr.  [address]  [address], [address]  [place] [address]  Phone: [phone]  Fax: [phone]             [id number]  [address]  [place] [address]  Phone: [phone]   Patient: Audrey Oneil Jr.   MR Number: [id number]   YOB: 1950   Date of Visit: 7/22/2022       Dear Dr. Isiah Adrian Jr.    Thank you for referring Audrey Oneil to me for evaluation. Attached you will find relevant portions of my assessment and plan of care.    If you have questions, please do not hesitate to call me. I look forward to following Audrey Oneil along with you.    Sincerely,    Migue Henry Jr, MD    Enclosure    If you would like to receive this communication electronically, please contact [email or web address] or [phone] to request [employer] access.    [employer] is a tool which provides read-only access to select patient information with whom you have a relationship. Its easy to use and provides real time access to review your patients record including encounter summaries, notes, results, and demographic information.    If you feel you have received this communication in error or would no longer like to receive these types of communications, please e-mail [email or web address]

## 2022-07-22 NOTE — PROGRESS NOTES
Addendum to LVAD Psychosocial conducted 5/18/22.    Pt presents in clinic today with Pam Saucedo, ph [phone], pt's SO of 30 years and primary caregiver. Pt presents with tired affect and reports he did not sleep well the previous night.    Ms. Saucedo states she is not employed, does drive, and understands the role and responsibilities of LVAD caregiver and is able to commit to them to the fullest extent required.    At this time pt presents as a suitable candidate for LVAD, medium risk due to limited income.     At end of appointment pt reports he was supposed to go to [facility] for dressing change but it conflicted with his MD appt this afternoon. SW phoned [facility] and pt's appointment there was moved to 3pm. Informed pt and caregiver who voice ability to go to [facility] for 3pm.     SW following and available.

## 2022-07-22 NOTE — TELEPHONE ENCOUNTER
I called Mr Oneil today as he is scheduled for SW appt and Dr Henry visit.  He said he was not coming.  After having a long conversation, he and his CG, Pam agreed to come to his appt today.  He has NOT had labs this week, is scheduled for dressing change and lab draw today.  Agreed to come to both the appts today.

## 2022-07-25 NOTE — TELEPHONE ENCOUNTER
Reviewed labs dates 7-22-22  Sent to Dr Henry for review.  Creatinine slightly elevated at 2.0  Recent Results (from the past 168 hour(s))   B-TYPE NATRIURETIC PEPTIDE    Collection Time: 07/22/22  2:57 PM   Result Value Ref Range     (H) 0 - 99 pg/mL   CBC W/ AUTO DIFFERENTIAL    Collection Time: 07/22/22  2:57 PM   Result Value Ref Range    WBC 5.44 3.90 - 12.70 K/uL    RBC 4.26 (L) 4.60 - 6.20 M/uL    Hemoglobin 12.0 (L) 14.0 - 18.0 g/dL    Hematocrit 37.7 (L) 40.0 - 54.0 %    MCV 89 82 - 98 fL    MCH 28.2 27.0 - 31.0 pg    MCHC 31.8 (L) 32.0 - 36.0 g/dL    RDW 16.7 (H) 11.5 - 14.5 %    Platelets 206 150 - 450 K/uL    MPV 11.0 9.2 - 12.9 fL    Immature Granulocytes 0.4 0.0 - 0.5 %    Gran # (ANC) 3.3 1.8 - 7.7 K/uL    Immature Grans (Abs) 0.02 0.00 - 0.04 K/uL    Lymph # 1.6 1.0 - 4.8 K/uL    Mono # 0.4 0.3 - 1.0 K/uL    Eos # 0.1 0.0 - 0.5 K/uL    Baso # 0.03 0.00 - 0.20 K/uL    nRBC 0 0 /100 WBC    Gran % 59.8 38.0 - 73.0 %    Lymph % 29.4 18.0 - 48.0 %    Mono % 7.2 4.0 - 15.0 %    Eosinophil % 2.6 0.0 - 8.0 %    Basophil % 0.6 0.0 - 1.9 %    Differential Method Automated    COMPREHENSIVE METABOLIC PANEL    Collection Time: 07/22/22  2:57 PM   Result Value Ref Range    Sodium 139 136 - 145 mmol/L    Potassium 5.1 3.5 - 5.1 mmol/L    Chloride 111 (H) 95 - 110 mmol/L    CO2 18 (L) 23 - 29 mmol/L    Glucose 97 70 - 110 mg/dL    BUN 27 (H) 8 - 23 mg/dL    Creatinine 2.0 (H) 0.5 - 1.4 mg/dL    Calcium 8.6 (L) 8.7 - 10.5 mg/dL    Total Protein 6.3 6.0 - 8.4 g/dL    Albumin 3.7 3.5 - 5.2 g/dL    Total Bilirubin 0.4 0.1 - 1.0 mg/dL    Alkaline Phosphatase 74 55 - 135 U/L    AST 19 10 - 40 U/L    ALT 16 10 - 44 U/L    Anion Gap 10 8 - 16 mmol/L    eGFR if African American 38.0 (A) >60 mL/min/1.73 m^2    eGFR if non  32.8 (A) >60 mL/min/1.73 m^2   Magnesium    Collection Time: 07/22/22  2:57 PM   Result Value Ref Range    Magnesium 2.1 1.6 - 2.6 mg/dL

## 2022-08-07 NOTE — TELEPHONE ENCOUNTER
Patient is scheduled for a colonoscopy tomorrow morning. Patient was not given verbal instructions. All questions and concerns were addressed regarding arrival time, medications to avoid, diet restrictions, and prep directions. Advised the patient to call back with any further questions.  Reason for Disposition   Question about upcoming scheduled test, no triage required and triager able to answer question    Protocols used: INFORMATION ONLY CALL - NO TRIAGE-A-

## 2022-08-08 NOTE — ANESTHESIA PREPROCEDURE EVALUATION
08/08/2022  Audrey Oneil Jr. is a 72 y.o., male.      Pre-op Assessment    I have reviewed the Patient Summary Reports.       I have reviewed the Medications.     Review of Systems  Anesthesia Hx:  History of prior surgery of interest to airway management or planning:  Denies Personal Hx of Anesthesia complications.   Cardiovascular:   Hypertension Past MI CAD  CABG/stent Dysrhythmias  CHF hyperlipidemia DVT / PE Hx  EF 15% with NL RV   Pulmonary:   Pneumonia    Renal/:   Chronic Renal Disease, CRI        Physical Exam  General: Well nourished    Airway:  Mallampati: III / II  Mouth Opening: Normal  TM Distance: Normal  Tongue: Normal  Neck ROM: Normal ROM    Chest/Lungs:  Normal Respiratory Rate    Heart:  Rate: Normal        Anesthesia Plan  Type of Anesthesia, risks & benefits discussed:    Anesthesia Type: Gen Natural Airway  Intra-op Monitoring Plan: Standard ASA Monitors  Post Op Pain Control Plan: multimodal analgesia  Induction:  IV  Informed Consent: Informed consent signed with the Patient and all parties understand the risks and agree with anesthesia plan.  All questions answered.   ASA Score: 4  Day of Surgery Review of History & Physical: H&P Update referred to the surgeon/provider.  Anesthesia Plan Notes: NPO confirmed.   Etomidate for induction.  Will plan aggressive use of dilute Epi (10mcg/cc) boluses as needed to maintain BP.      Ready For Surgery From Anesthesia Perspective.     .

## 2022-08-08 NOTE — TRANSFER OF CARE
"Anesthesia Transfer of Care Note    Patient: Audrey Oneil Jr.    Procedure(s) Performed: Procedure(s) (LRB):  COLONOSCOPY (N/A)    Patient location: [facility]    Anesthesia Type: MAC    Transport from OR: Transported from OR on 2-3 L/min O2 by NC with adequate spontaneous ventilation    Post pain: adequate analgesia    Post assessment: no apparent anesthetic complications and tolerated procedure well    Post vital signs: stable    Level of consciousness: awake and alert    Nausea/Vomiting: no nausea/vomiting    Complications: none    Transfer of care protocol was followed      Last vitals:   Visit Vitals  /72 (BP Location: Left arm, Patient Position: Lying)   Pulse 78   Temp 36.6 °C (97.8 °F) (Temporal)   Resp 17   Ht 5' 7" (1.702 m)   Wt 98 kg (216 lb)   SpO2 (!) 94%   BMI 33.83 kg/m²     "

## 2022-08-08 NOTE — PLAN OF CARE
Patient is stable and ready for discharge. Instructions given to patient and family. Questions answered. Patient tolerating po liquids with no difficulty. Patient has no pain or states it is a tolerable level for them. Anesthesia consent and surgical consent in chart.

## 2022-08-08 NOTE — H&P
Short Stay Endoscopy History and Physical    PCP - Primary Doctor No    Procedure - Colonoscopy     ASA - IV  Mallampati - per anesthesia  History of Anesthesia problems - no  Family history Anesthesia problems - no     HPI:  Audrey Oneil Jr. a 72 y.o. male here for evaluation of colorectal cancer screening. His last colonoscopy was approximately 25 years ago and reportedly showed one polyp. He has a FH of colon cancer (mother, diagnosed at age 80).       ROS:  Constitutional: No fevers, chills, No weight loss  ENT: No allergies  CV: No chest pain  Pulm: No shortness of breath  GI: see HPI  Derm: No rash    Medical History:  has a past medical history of Acute hypoxemic respiratory failure (11/7/2015), Acute idiopathic gout of left knee (12/2/2019), Acute idiopathic gout of right elbow (9/23/2021), AICD (automatic cardioverter/defibrillator) present (12/13/2015), Anticoagulant long-term use, Bronchopneumonia (12/20/2021), CHF (congestive heart failure), Chronic anticoagulation (5/5/2016), Chronic combined systolic and diastolic heart failure (11/26/2012), Chronic gout (12/2/2019), Clotting disorder, Coronary artery disease involving native coronary artery of native heart without angina pectoris (11/26/2012), COVID-19 (08/2021), Diverticulosis of colon, DVT (deep venous thrombosis), unspecified laterality (11/12/2015), Dysphonia (1/28/2018), Essential hypertension (11/15/2015), Fine motor impairment (2/2/2021), Hyperlipidemia, Hypertensive heart disease with heart failure (5/5/2016), MI (myocardial infarction) (2009), Nicotine abuse, Obesity (11/26/2012), Olecranon bursitis of right elbow (9/19/2021), Pulmonary embolism (2011), Renal disorder, Right carpal tunnel syndrome (4/6/2018), Stented coronary artery (11/26/2012), and Trigger thumb of right hand (4/6/2018).    Surgical History:  has a past surgical history that includes Back surgery; Appendectomy; Tonsillectomy; r knee scope; Spine surgery; Cardiac surgery  (2007); Cardiac defibrillator placement; Carpal tunnel release (Right, 04/2018); Trigger finger release (Right, 04/2018); Insertion of biventricular implantable cardioverter-defibrillator (ICD) (N/A, 5/3/2019); Revision of skin pocket for cardioverter-defibrillator (Left, 5/3/2019); Right heart catheterization (Right, 6/14/2021); and Right heart catheterization (Right, 6/17/2022).    Family History: family history includes Cancer in his mother and paternal grandmother; Colon polyps in his father; Diabetes in his mother; Heart disease in his father and mother; No Known Problems in his daughter, sister, sister, son, and son; Stroke in his father.. Otherwise no colon cancer, inflammatory bowel disease, or GI malignancies.    Social History:  reports that he quit smoking about 16 years ago. His smoking use included cigarettes. He has a 45.00 pack-year smoking history. He has never used smokeless tobacco. He reports that he does not drink alcohol and does not use drugs.    Review of patient's allergies indicates:   Allergen Reactions    Iodine containing multivitamin Swelling     itching    Keflex [cephalexin] Swelling     Eyes.  Tolerated multiple doses of zosyn and 1 dose of augmentin in 2015 and 2016, respectively    Peaches [peach (prunus persica)] Swelling     eyes    Shellfish containing products Swelling    Fig tree Swelling     itching    Tuberculin spenser test ppd Rash       Medications:   Medications Prior to Admission   Medication Sig Dispense Refill Last Dose    allopurinoL (ZYLOPRIM) 100 MG tablet Take 2 tablets (200 mg total) by mouth once daily. 60 tablet 0 8/7/2022 at Unknown time    amiodarone (PACERONE) 200 MG Tab TAKE 1 AND 1/2 TABLETS(300 MG) BY MOUTH EVERY  tablet 3 8/8/2022 at Unknown time    aspirin (ECOTRIN) 81 MG EC tablet Take 1 tablet (81 mg total) by mouth once daily. 90 tablet 3 8/7/2022 at Unknown time    atorvastatin (LIPITOR) 80 MG tablet Take 1 tablet (80 mg total) by mouth  once daily. 90 tablet 1 8/8/2022 at Unknown time    furosemide (LASIX) 40 MG tablet Take 1 tablet (40 mg total) by mouth daily as needed (Weight gain of 3 lbs in one day or 5 lbs in one week). 30 tablet 11 Past Week at Unknown time    JARDIANCE 25 mg tablet Take 25 mg by mouth once daily.   8/7/2022 at Unknown time    sacubitriL-valsartan (ENTRESTO) 49-51 mg per tablet Take 1 tablet by mouth 2 (two) times daily. 60 tablet 3 8/7/2022 at Unknown time    spironolactone (ALDACTONE) 25 MG tablet Take 1 tablet (25 mg total) by mouth once daily. 30 tablet 3 8/7/2022 at Unknown time    DOBUTamine (DOBUTREX) 500 mg/250 mL (2,000 mcg/mL) 2.5 mcg/kg/min  Patient is 95.7 kg 250 mL 5     HYDROcodone-acetaminophen (NORCO)  mg per tablet Take 1 tablet by mouth every 6 (six) hours.       ondansetron (ZOFRAN-ODT) 4 MG TbDL Dissolve 1 tablet (4 mg total) by mouth every 6 (six) hours as needed (nausea). 30 tablet 1     polyethylene glycol (GOLYTELY) 236-22.74-6.74 -5.86 gram suspension SMARTSIG:Milliliter(s) By Mouth       simethicone (MYLICON) 80 MG chewable tablet Take 1 tablet (80 mg total) by mouth every 6 (six) hours as needed for Flatulence. 60 tablet 1          Objective Findings:    Vital Signs: see nursing notes    Physical Exam:  General Appearance: Well appearing in no acute distress  Eyes:    No scleral icterus  ENT: Neck supple  Lungs: CTA anteriorly  Heart:  S1, S2 normal, no murmurs heard  Abdomen: Soft, non tender, non distended with positive bowel sounds. No hepatosplenomegaly, ascites, or mass  Extremities: no edema  Skin: No rash      Labs:  Lab Results   Component Value Date    WBC 4.86 08/01/2022    HGB 12.6 (L) 08/01/2022    HCT 39.3 (L) 08/01/2022     08/01/2022    CHOL 145 05/18/2022    TRIG 186 (H) 05/18/2022    HDL 50 05/18/2022    ALT 15 08/01/2022    AST 16 08/01/2022     08/01/2022    K 4.2 08/01/2022     08/01/2022    CREATININE 1.7 (H) 08/01/2022    BUN 31 (H) 08/01/2022     CO2 21 (L) 08/01/2022    TSH 2.834 06/22/2022    PSA 2.9 05/18/2022    INR 1.0 06/17/2022    HGBA1C 5.6 05/28/2022         Assessment:   Audrey Oneil Jr. is a 72 y.o. male presenting today for a colonoscopy for evaluation of colorectal cancer screening.       Plan:   -Proceed with scheduled procedure today. I have explained the risks and benefits of endoscopy procedures to the patient including but not limited to bleeding, perforation, infection, and death.  -Evaluation and consent for anesthesia portion of this procedure completed by anesthesia team.         Cholo Newman MD, PGY-V  Gastroenterology Fellow  [facility]

## 2022-08-08 NOTE — PROVATION PATIENT INSTRUCTIONS
Discharge Summary/Instructions after an Endoscopic Procedure  Patient Name: Audrey Oneil  Patient MRN: [id number]  Patient YOB: 1950 Monday, August 8, 2022  Sascha Keenan MD  Dear patient,  As a result of recent federal legislation (The Federal Cures Act), you may   receive lab or pathology results from your procedure in your [facility]   account before your physician is able to contact you. Your physician or   their representative will relay the results to you with their   recommendations at their soonest availability.  Thank you,  RESTRICTIONS:  During your procedure today, you received medications for sedation.  These   medications may affect your judgment, balance and coordination.  Therefore,   for 24 hours, you have the following restrictions:   - DO NOT drive a car, operate machinery, make legal/financial decisions,   sign important papers or drink alcohol.    ACTIVITY:  Today: no heavy lifting, straining or running due to procedural   sedation/anesthesia.  The following day: return to full activity including work.  DIET:  Eat and drink normally unless instructed otherwise.     TREATMENT FOR COMMON SIDE EFFECTS:  - Mild abdominal pain, nausea, belching, bloating or excessive gas:  rest,   eat lightly and use a heating pad.  - Sore Throat: treat with throat lozenges and/or gargle with warm salt   water.  - Because air was used during the procedure, expelling large amounts of air   from your rectum or belching is normal.  - If a bowel prep was taken, you may not have a bowel movement for 1-3 days.    This is normal.  SYMPTOMS TO WATCH FOR AND REPORT TO YOUR PHYSICIAN:  1. Abdominal pain or bloating, other than gas cramps.  2. Chest pain.  3. Back pain.  4. Signs of infection such as: chills or fever occurring within 24 hours   after the procedure.  5. Rectal bleeding, which would show as bright red, maroon, or black stools.   (A tablespoon of blood from the rectum is not serious, especially  if   hemorrhoids are present.)  6. Vomiting.  7. Weakness or dizziness.  GO DIRECTLY TO THE NEAREST EMERGENCY ROOM IF YOU HAVE ANY OF THE FOLLOWING:      Difficulty breathing              Chills and/or fever over 101 F   Persistent vomiting and/or vomiting blood   Severe abdominal pain   Severe chest pain   Black, tarry stools   Bleeding- more than one tablespoon   Any other symptom or condition that you feel may need urgent attention  Your doctor recommends these additional instructions:  If any biopsies were taken, your doctors clinic will contact you in 1 to 2   weeks with any results.  - Discharge patient to home (with escort).   - Resume previous diet today.   - Await pathology results.   - Repeat colonoscopy in 5 years for screening purposes due to prep quality   and polyp.  For questions, problems or results please call your physician - Sascha Keenan MD at Work:  [phone].  [facility], EMERGENCY ROOM PHONE NUMBER: [phone]  IF A COMPLICATION OR EMERGENCY SITUATION ARISES AND YOU ARE UNABLE TO REACH   YOUR PHYSICIAN - GO DIRECTLY TO THE EMERGENCY ROOM.  Sascha Keenan MD  8/8/2022 10:58:07 AM  This report has been verified and signed electronically.  Dear patient,  As a result of recent federal legislation (The Federal Cures Act), you may   receive lab or pathology results from your procedure in your [facility]   account before your physician is able to contact you. Your physician or   their representative will relay the results to you with their   recommendations at their soonest availability.  Thank you,  PROVATION

## 2022-08-08 NOTE — ANESTHESIA POSTPROCEDURE EVALUATION
Anesthesia Post Evaluation    Patient: Audrey Oneil JrFly    Procedure(s) Performed: Procedure(s) (LRB):  COLONOSCOPY (N/A)    Final Anesthesia Type: general      Patient location during evaluation: PACU  Patient participation: Yes- Able to Participate  Level of consciousness: awake and alert  Post-procedure vital signs: reviewed and stable  Pain management: adequate  Airway patency: patent    PONV status at discharge: No PONV  Anesthetic complications: no      Cardiovascular status: blood pressure returned to baseline  Respiratory status: unassisted  Hydration status: euvolemic  Follow-up not needed.          Vitals Value Taken Time   /63 08/08/22 1105   Temp 36.6 °C (97.8 °F) 08/08/22 1050   Pulse 75 08/08/22 1115   Resp 13 08/08/22 1105   SpO2 99 % 08/08/22 1115   Vitals shown include unvalidated device data.      No case tracking events are documented in the log.      Pain/Magaly Score: Magaly Score: 9 (8/8/2022 11:05 AM)

## 2022-08-17 NOTE — TELEPHONE ENCOUNTER
Called the [facility] where patient currently resides- had to leave a voice message as no one answered my call  Stated that patient had an appointment with Mickey Saba on 9/19/22 that needed to be rescheduled as Mickey Saba will not be in the office this day  Stated that patient's appointment was rescheduled for 10/3/22  Stated that if for some reason this date and or time does not work for patient or facility to please give the office a call back so that we can get something scheduled that works better  Mr Oneil was discussed with HTS team to determine suitability for VAD placement.  At this time, recommendation is to complete MRI and GI consult, will discuss at Selection once eval is complete.  I spoke with pt, who verbalized understanding.

## 2022-08-19 NOTE — TELEPHONE ENCOUNTER
----- Message from Talia Wilkins RN sent at 8/1/2022 10:01 AM CDT -----  Regarding: Labs weekly

## 2022-08-20 NOTE — TELEPHONE ENCOUNTER
Pt possibly took 2 doses of his Entresto, but not sure.  Care advice states to call poison control, number was provided to the patient.  All questions answered.    Reason for Disposition   MORE THAN A DOUBLE DOSE of a prescription or over-the-counter (OTC) drug    Additional Information   Negative: [1] Intentional drug overdose AND [2] suicidal thoughts or ideas    Protocols used: MEDICATION QUESTION CALL-A-AH

## 2022-08-23 NOTE — TELEPHONE ENCOUNTER
----- Message from Ryder Tavera sent at 8/23/2022  9:49 AM CDT -----       Type: Patient Returning Call    Who Called: Patient   Who Left Message for Patient: NA  Does the patient know what this is regarding?: Patient would like a call back.   Would the patient rather a call back or a response via [facility]? Call   Best Call Back Number: [phone]  Additional Information: Please assist, thank you!

## 2022-08-23 NOTE — TELEPHONE ENCOUNTER
Called Mr Oneil again, left message for him to call.  I spoke with Mr Oneil, advised that he missed appt today. He rescheduled for tomorrow.  Additionally, I sent GI a message asking for help scheduling the referral placed in June for  1. Small left rectus sheath hematoma, decreased in size from prior.  2. Multiple indeterminate hypodensities in the pancreas, possibly representing side branch IPMNs.  Recommend further evaluation with MRI of the abdomen with and without contrast which can be performed on a nonemergent basis.  3. Colonic diverticulosis without evidence of acute diverticulitis.  4. Moderate calcified atherosclerosis.

## 2022-08-23 NOTE — TELEPHONE ENCOUNTER
Called pt - no answer, called his sister, B Aimee- she will try to reach him to make sure he is ok.

## 2022-08-24 NOTE — TELEPHONE ENCOUNTER
----- Message from Jovany Siddiqi sent at 8/24/2022  4:01 PM CDT -----  Regarding: Call back  PT would like to know more of why he was scheduled today.  PT went to wrong location but is confused about why he had this appointment.  Please call pt @ [phone]        Thanks

## 2022-08-24 NOTE — TELEPHONE ENCOUNTER
I spoke with Mr Oneil- he went to [place] for his appt today.  I reminded him that his appt today is with Dr Henry, and his appt in [place] is next week with GI.  Rescheduled appt with Dr Henry for tomorrow at 430; instructed pt to arrive by 4 pm. Verbalized understanding of instructions.

## 2022-08-25 NOTE — LETTER
September 9, 2022        Isiah Adrian Jr.  [address]  [address], [address]  [place] [address]  Phone: [phone]  Fax: [phone]             [id number]  [address]  [place] [address]  Phone: [phone]   Patient: Audrey Oneil Jr.   MR Number: [id number]   YOB: 1950   Date of Visit: 8/25/2022       Dear Dr. Isiah Adrian Jr.    Thank you for referring Audrey Oneil to me for evaluation. Attached you will find relevant portions of my assessment and plan of care.    If you have questions, please do not hesitate to call me. I look forward to following Audrey Oneil along with you.    Sincerely,    Migue Henry Jr, MD    Enclosure    If you would like to receive this communication electronically, please contact [email or web address] or [phone] to request [employer] access.    [employer] is a tool which provides read-only access to select patient information with whom you have a relationship. Its easy to use and provides real time access to review your patients record including encounter summaries, notes, results, and demographic information.    If you feel you have received this communication in error or would no longer like to receive these types of communications, please e-mail [email or web address]

## 2022-08-30 NOTE — TELEPHONE ENCOUNTER
Labs reviewed and are WNL for this pt.  Pt is scheduled to see GI on 9-1 and MRI on 9-6  Recent Results (from the past 24 hour(s))   B-TYPE NATRIURETIC PEPTIDE    Collection Time: 08/29/22  5:11 PM   Result Value Ref Range     (H) 0 - 99 pg/mL   CBC W/ AUTO DIFFERENTIAL    Collection Time: 08/29/22  5:11 PM   Result Value Ref Range    WBC 5.23 3.90 - 12.70 K/uL    RBC 4.62 4.60 - 6.20 M/uL    Hemoglobin 12.8 (L) 14.0 - 18.0 g/dL    Hematocrit 39.3 (L) 40.0 - 54.0 %    MCV 85 82 - 98 fL    MCH 27.7 27.0 - 31.0 pg    MCHC 32.6 32.0 - 36.0 g/dL    RDW 14.9 (H) 11.5 - 14.5 %    Platelets 240 150 - 450 K/uL    MPV 10.6 9.2 - 12.9 fL    Immature Granulocytes 0.2 0.0 - 0.5 %    Gran # (ANC) 3.3 1.8 - 7.7 K/uL    Immature Grans (Abs) 0.01 0.00 - 0.04 K/uL    Lymph # 1.5 1.0 - 4.8 K/uL    Mono # 0.4 0.3 - 1.0 K/uL    Eos # 0.1 0.0 - 0.5 K/uL    Baso # 0.04 0.00 - 0.20 K/uL    nRBC 0 0 /100 WBC    Gran % 62.3 38.0 - 73.0 %    Lymph % 28.7 18.0 - 48.0 %    Mono % 6.7 4.0 - 15.0 %    Eosinophil % 1.3 0.0 - 8.0 %    Basophil % 0.8 0.0 - 1.9 %    Differential Method Automated    COMPREHENSIVE METABOLIC PANEL    Collection Time: 08/29/22  5:11 PM   Result Value Ref Range    Sodium 137 136 - 145 mmol/L    Potassium 4.3 3.5 - 5.1 mmol/L    Chloride 107 95 - 110 mmol/L    CO2 23 23 - 29 mmol/L    Glucose 100 70 - 110 mg/dL    BUN 27 (H) 8 - 23 mg/dL    Creatinine 1.8 (H) 0.5 - 1.4 mg/dL    Calcium 9.0 8.7 - 10.5 mg/dL    Total Protein 6.9 6.0 - 8.4 g/dL    Albumin 3.9 3.5 - 5.2 g/dL    Total Bilirubin 0.6 0.1 - 1.0 mg/dL    Alkaline Phosphatase 80 55 - 135 U/L    AST 16 10 - 40 U/L    ALT 11 10 - 44 U/L    Anion Gap 7 (L) 8 - 16 mmol/L    eGFR 40.0 (A) >60 mL/min/1.73 m^2   Magnesium    Collection Time: 08/29/22  5:11 PM   Result Value Ref Range    Magnesium 2.0 1.6 - 2.6 mg/dL

## 2022-09-07 NOTE — PROGRESS NOTES
Reviewed labs from  in [facility]  dated 9/6/22          .   Results are consistent with recent trends.

## 2022-09-10 NOTE — PROGRESS NOTES
Subjective:   Transplant status: active    HPI:  Mr. Oneil is a 72 y.o. year old White male who has presented to be evaluated as a potential heart transplant recipient.      71 yo WM seen 5/18/22  previously refused LVAD but has reconsidered since hospitalization January 2022 for recurrent VT (he thinks had MI) and cardiogenic shock for which hosp at [facility].  He changed his mind about LVAD while in extremis at [facility] and I accepted in transfer.  While awaiting hospital bed he was transferred to [facility] and was treated for cardiogenic shock there and subsequently sent home on .  He remains on  and is pursuing evaluation for LVAD.  He reports being told at [facility] that they could not place IABP due to aneurysm but our CT abd and pelvis 4/6/22 and report specifically says no aneurysm.     He has had a couple of observation admissions where he was told to be dry at visit June 2022. Aty visit July 2022 noted that he cannot tolerate higher doses of meds due to symptomatic hypotension.  He still notes occasional dizzy spells when he 1st stands but there are not too bad at this time.  Uses Lasix only as needed targeting his home weight 218-220.  I feel that he has continued to gain true body as his baseline dry weight at home previously had been lower to remember to 211-212 lb at the May 2022 visit and that was up from 207-208 lb.  On our scale July visit wt 210# and at prior visit here 194#--he has reversed his weight loss--based upon exam this is not fluid--going to Bioscript after visit for routine labs and dressing change    His case was discussed at selection committee and he was deferred pending evaluation of pancreatic mass.  They wish to proceed with MRI but not sure with his ICD if this will be possible.  He reports his heart failure symptoms are stable.  As long as he paces himself can walk without significant shortness of breath.  He is not experiencing orthopnea or PND.  No symptomatic arrhythmia.  Home weight  this AM was 216# and my scale today wt 217#.       Past Medical History:   Diagnosis Date    Acute hypoxemic respiratory failure 11/7/2015    Acute idiopathic gout of left knee 12/2/2019    Acute idiopathic gout of right elbow 9/23/2021    AICD (automatic cardioverter/defibrillator) present 12/13/2015      Anticoagulant long-term use     Bronchopneumonia 12/20/2021    CHF (congestive heart failure)     Chronic anticoagulation 5/5/2016    Chronic combined systolic and diastolic heart failure 11/26/2012    EF 10-20% on ECHO 2013    Chronic gout 12/2/2019    Clotting disorder     Coronary artery disease involving native coronary artery of native heart without angina pectoris 11/26/2012    Cath 10- Stents patent non-obstructive disease Cath 11-12015 non-obstructive disease     COVID-19 08/2021    Had antibody infusion    Diverticulosis of colon     DVT (deep venous thrombosis), unspecified laterality 11/12/2015    Dysphonia 1/28/2018    Essential hypertension 11/15/2015    Fine motor impairment 2/2/2021    Hyperlipidemia     Hypertensive heart disease with heart failure 5/5/2016    MI (myocardial infarction) 2009    x's 5    Nicotine abuse     Obesity 11/26/2012    Olecranon bursitis of right elbow 9/19/2021    Pulmonary embolism 2011    Renal disorder     LAVERNE    Right carpal tunnel syndrome 4/6/2018    Stented coronary artery 11/26/2012    LAD stent placed 10/17/2007     Trigger thumb of right hand 4/6/2018     Past Surgical History:   Procedure Laterality Date    APPENDECTOMY      BACK SURGERY      CARDIAC DEFIBRILLATOR PLACEMENT      CARDIAC SURGERY  2007    stent    CARPAL TUNNEL RELEASE Right 04/2018    COLONOSCOPY N/A 8/8/2022    Procedure: COLONOSCOPY;  Surgeon: Sascha Keenan MD;  Location: [facility] ENDO ([address] [place] [address]);  Service: Endoscopy;  Laterality: N/A;  EF-15%  pre heart    AICD-St Rodri       COVID test [facility] 8/5-inst mail-am prep-clears 4 hrs prior-tb    INSERTION OF BIVENTRICULAR IMPLANTABLE  "CARDIOVERTER-DEFIBRILLATOR (ICD) N/A 5/3/2019    Procedure: INSERTION, ICD, BIVENTRICULAR;  Surgeon: Teofilo Pal MD;  Location: [facility] EP LAB;  Service: Cardiology;  Laterality: N/A;  ICH CM,  ICD UPGD BI-V,  SJM, MAC, FAS, 3PREP (dual ICD INSITU)    r knee scope      REVISION OF SKIN POCKET FOR CARDIOVERTER-DEFIBRILLATOR Left 5/3/2019    Procedure: Revision, Skin Pocket, For Cardioverter-Defibrillator;  Surgeon: Teofilo Pal MD;  Location: [facility] EP LAB;  Service: Cardiology;  Laterality: Left;    RIGHT HEART CATHETERIZATION Right 6/14/2021    Procedure: INSERTION, CATHETER, RIGHT HEART;  Surgeon: Lizz Nieto MD;  Location: [facility] CATH LAB;  Service: Cardiology;  Laterality: Right;    RIGHT HEART CATHETERIZATION Right 6/17/2022    Procedure: INSERTION, CATHETER, RIGHT HEART;  Surgeon: Migue Henry Jr., MD;  Location: [facility] CATH LAB;  Service: Cardiology;  Laterality: Right;    SPINE SURGERY      TONSILLECTOMY      TRIGGER FINGER RELEASE Right 04/2018    thumb       Review of Systems   Constitutional: Positive for malaise/fatigue. Negative for chills, fever, night sweats, weight gain and weight loss.   Cardiovascular:  Positive for dyspnea on exertion (paces himself does "OK" on home inotrope). Negative for chest pain, irregular heartbeat, leg swelling, near-syncope, orthopnea, palpitations, paroxysmal nocturnal dyspnea and syncope.   Respiratory:  Positive for cough, sleep disturbances due to breathing (intolerant to CPAP), snoring and sputum production. Negative for wheezing.    Hematologic/Lymphatic: Bruises/bleeds easily (bruising).   Musculoskeletal:  Positive for back pain, joint pain, muscle weakness and stiffness. Negative for arthritis.   Gastrointestinal:  Negative for change in bowel habit, hematochezia and melena.   Genitourinary:  Negative for hematuria.   Neurological:  Positive for dizziness, light-headedness, numbness (numbness of the right foot and leg as well as the left arm.  " "Occasionally have sharp shooting pains in the right leg) and weakness. Negative for brief paralysis, focal weakness and seizures.   Objective:   Blood pressure 107/63, pulse 78, height 5' 7" (1.702 m), weight 98.2 kg (216 lb 7.9 oz).body mass index is 33.91 kg/m².  Physical Exam  Constitutional:       General: He is not in acute distress.     Appearance: He is well-developed. He is not diaphoretic.      Comments: /63 (BP Location: Left arm, Patient Position: Sitting, BP Method: Pediatric (Automatic))   Pulse 78   Ht 5' 7" (1.702 m)   Wt 98.2 kg (216 lb 7.9 oz)   BMI 33.91 kg/m²   Last visit here wt 220#  Obese WM in NAD   HENT:      Head: Normocephalic and atraumatic.   Eyes:      General: No scleral icterus.        Right eye: No discharge.         Left eye: No discharge.      Conjunctiva/sclera: Conjunctivae normal.   Neck:      Thyroid: No thyromegaly.      Vascular: No JVD (below 10 cm).   Cardiovascular:      Rate and Rhythm: Normal rate and regular rhythm.      Heart sounds: Murmur (Grade 1/6 systolic murmur base and LSB) heard.     No gallop.   Pulmonary:      Effort: Pulmonary effort is normal.      Breath sounds: Normal breath sounds.   Abdominal:      General: Bowel sounds are normal. There is no distension.      Palpations: Abdomen is soft. There is no hepatomegaly (liver span 12 cm).      Tenderness: There is no abdominal tenderness.   Musculoskeletal:         General: Swelling (0.5+) present. No tenderness.   Skin:     General: Skin is warm and dry.   Neurological:      General: No focal deficit present.      Mental Status: He is alert and oriented to person, place, and time. Mental status is at baseline.      Motor: No weakness.   Psychiatric:         Mood and Affect: Mood normal.         Behavior: Behavior normal.         Thought Content: Thought content normal.         Judgment: Judgment normal.     Labs 8/22/22 CBC, CMP and BNP all stable and in fact Cr better at 1.4    Assessment:      1. " Chronic combined systolic and diastolic heart failure        Plan:   Patient is now  on home  functions as NYHA Class 3  Continue evaluation of abd mass as last step for LVAD candidacy determination  Weekly lab Bioscript    Recommend 2 gram sodium restriction and 1500cc fluid restriction.  Encourage physical activity with graded exercise program.  Requested patient to weigh themselves daily, and to notify us if their weight increases by more than 3 lbs in 1 day or 5 lbs in 1 week.     Listed for transplant:  he is not a transplant candidate; under consideration for DT LVAD    Patient advised that it is recommended that all transplant candidates, and their close contacts and household members receive Covid vaccination.    UNOS Patient Status  Functional Status: 70% - Cares for self: unable to carry on normal activity or active work  Physical Capacity: No Limitations  Working for Income: No  If no, reason not working: Disability

## 2022-09-12 NOTE — TELEPHONE ENCOUNTER
Attempted to reach Mr. Oneil to discuss rescheduling missed GI appt on 9/1/22. LM to return phone call.

## 2022-09-12 NOTE — TELEPHONE ENCOUNTER
Patient returned phone call. Pt reports that he showed up for his GI appt at the wrong time and was not able to be seen. He reports that GI office was supposed to reschedule his appt but has not done so yet. I advised the patient to call the GI office to have appt scheduled to prevent any further delay in his LVAD evaluation. GI department's phone number provided. Patient reports that he is in need of refills for spironolactone and entresto. Per med orders, patient has several refills remaining. [facility] pharmacy number provided to patient to request refill. Patient verbalized understanding and agrees to get appt rescheduled.

## 2022-09-13 NOTE — TELEPHONE ENCOUNTER
Reviewed labs from  in [facility] dated 9/12/22. Results are consistent with recent trends. BNP slowly trending up, will continue to monitor. Local cardiologist continues to manage labs and dobutamine infusion.

## 2022-09-13 NOTE — TELEPHONE ENCOUNTER
----- Message from Fam Stubbs sent at 9/12/2022  2:41 PM CDT -----  Regarding: appt  Type:  Sooner Apoointment Request    Caller is requesting a sooner appointment.  Caller declined first available appointment listed below.  Caller will not accept being placed on the waitlist and is requesting a message be sent to doctor.  Name of Caller:patient     When is the first available appointment? None     Symptoms:cyst/ reschedule     Would the patient rather a call back or a response via [facility]? Call back   Best Call Back Number:[phone]  Additional Information: n/a

## 2022-09-14 NOTE — CONSULTS
"[facility] - Emergency Dept  Heart Transplant  Consult Note    Patient Name: uAdrey Oneil Jr.  MRN: [id number]  Admission Date: 9/14/2022  Hospital Length of Stay: 0 days  Attending Physician: Lupe Infante MD  Primary Care Provider: Primary Doctor No   Principal Problem:Chronic combined systolic and diastolic congestive heart failure    Inpatient consult to Cardiology  Consult performed by: Shona Manuel MD  Consult ordered by: Masoud Rashid MD  Reason for consult: abdominal pain        Subjective:     History of Present Illness:  73 yo WM being worked up for LVAD since hospitalization January 2022 for recurrent VT (he thinks had MI) and cardiogenic shock at [facility]. He was  later seen in [facility] where he was treated for cardiogenic shock and sent home on .  He remains on  and is pursuing evaluation for LVAD.     He has had a couple of observation admissions where he was told to be dry at visit June 2022. At July 2022 visit he was noted that he could not tolerate higher doses of meds due to symptomatic hypotension.  He still notes occasional dizzy spells when he 1st stands but there are not too bad at this time.  Uses Lasix only as needed targeting his home weight 218-220.      His case was discussed at selection committee and he was deferred pending evaluation of pancreatic mass.  They wish to proceed with MRI but not sure with his ICD if this will be possible. Home weight this AM was 216#.    Interval History:  Today he comes in due to abdominal discomfort. He ate packaged spaghetti last night and has since been with abdominal pain and dry heaving. No vomiting or diarrhea. He intermittently uses home oxygen 4L and felt the need to start using last night. He states he feels like " a bug got him". He reports noticing that his HR was significantly elevated over night about 100-115bpm. He is compliant with his lasix. No lower extremity edema. Felt warm this morning. His abdomen appears distended however " he reports it normally gets firm/tight when volume overloaded, which is not the case at this time.     Bedside echo shows EF approx 25-30%. IVC measuring approx 1.4cm and collapsible. No JVD. CXR shows bilateral pulmonary congestion.    Echo 9/14/22   Moderate left atrial enlargement.   The left ventricle is severely enlarged with eccentric hypertrophy and severely decreased systolic function.   There is severe left ventricular global hypokinesis with anterospetal and apical scar. The estimated ejection fraction is 10-15%.   Left ventricular diastolic dysfunction.   Normal right ventricular size with normal right ventricular systolic function.   Mild mitral regurgitation.   Normal central venous pressure (3 mmHg).   There is no significant tricuspid regurgitation and therefore the pulmonary artery systolic pressure cannot be reported.         Past Medical History:   Diagnosis Date    Acute hypoxemic respiratory failure 11/7/2015    Acute idiopathic gout of left knee 12/2/2019    Acute idiopathic gout of right elbow 9/23/2021    AICD (automatic cardioverter/defibrillator) present 12/13/2015      Anticoagulant long-term use     Bronchopneumonia 12/20/2021    CHF (congestive heart failure)     Chronic anticoagulation 5/5/2016    Chronic combined systolic and diastolic heart failure 11/26/2012    EF 10-20% on ECHO 2013    Chronic gout 12/2/2019    Clotting disorder     Coronary artery disease involving native coronary artery of native heart without angina pectoris 11/26/2012    Cath 10- Stents patent non-obstructive disease Cath 11-12015 non-obstructive disease     COVID-19 08/2021    Had antibody infusion    Diverticulosis of colon     DVT (deep venous thrombosis), unspecified laterality 11/12/2015    Dysphonia 1/28/2018    Essential hypertension 11/15/2015    Fine motor impairment 2/2/2021    Hyperlipidemia     Hypertensive heart disease with heart failure 5/5/2016    MI  (myocardial infarction) 2009    x's 5    Nicotine abuse     Obesity 11/26/2012    Olecranon bursitis of right elbow 9/19/2021    Pulmonary embolism 2011    Renal disorder     LAVERNE    Right carpal tunnel syndrome 4/6/2018    Stented coronary artery 11/26/2012    LAD stent placed 10/17/2007     Trigger thumb of right hand 4/6/2018       Past Surgical History:   Procedure Laterality Date    APPENDECTOMY      BACK SURGERY      CARDIAC DEFIBRILLATOR PLACEMENT      CARDIAC SURGERY  2007    stent    CARPAL TUNNEL RELEASE Right 04/2018    COLONOSCOPY N/A 8/8/2022    Procedure: COLONOSCOPY;  Surgeon: Sascha Keenan MD;  Location: [facility] ENDO (2ND FLR);  Service: Endoscopy;  Laterality: N/A;  EF-15%  pre heart    AICD-[facility]       COVID test [facility] 8/5-inst mail-am prep-clears 4 hrs prior-tb    INSERTION OF BIVENTRICULAR IMPLANTABLE CARDIOVERTER-DEFIBRILLATOR (ICD) N/A 5/3/2019    Procedure: INSERTION, ICD, BIVENTRICULAR;  Surgeon: Teofilo Pal MD;  Location: [facility] EP LAB;  Service: Cardiology;  Laterality: N/A;  ICH CM,  ICD UPGD BI-V,  SJM, MAC, FAS, 3PREP (dual ICD INSITU)    r knee scope      REVISION OF SKIN POCKET FOR CARDIOVERTER-DEFIBRILLATOR Left 5/3/2019    Procedure: Revision, Skin Pocket, For Cardioverter-Defibrillator;  Surgeon: Teofilo Pal MD;  Location: [facility] EP LAB;  Service: Cardiology;  Laterality: Left;    RIGHT HEART CATHETERIZATION Right 6/14/2021    Procedure: INSERTION, CATHETER, RIGHT HEART;  Surgeon: Lizz Nieto MD;  Location: [facility] CATH LAB;  Service: Cardiology;  Laterality: Right;    RIGHT HEART CATHETERIZATION Right 6/17/2022    Procedure: INSERTION, CATHETER, RIGHT HEART;  Surgeon: Migue Henry Jr., MD;  Location: [facility] CATH LAB;  Service: Cardiology;  Laterality: Right;    SPINE SURGERY      TONSILLECTOMY      TRIGGER FINGER RELEASE Right 04/2018    thumb       Review of patient's allergies indicates:   Allergen Reactions    Iodine containing  multivitamin Swelling     itching    Keflex [cephalexin] Swelling     Eyes.  Tolerated multiple doses of zosyn and 1 dose of augmentin in 2015 and 2016, respectively    Peaches [peach (prunus persica)] Swelling     eyes    Shellfish containing products Swelling    Fig tree Swelling     itching    Tuberculin spenser test ppd Rash       No current facility-administered medications on file prior to encounter.     Current Outpatient Medications on File Prior to Encounter   Medication Sig    allopurinoL (ZYLOPRIM) 100 MG tablet Take 2 tablets (200 mg total) by mouth once daily.    amiodarone (PACERONE) 200 MG Tab TAKE 1 AND 1/2 TABLETS(300 MG) BY MOUTH EVERY DAY    aspirin (ECOTRIN) 81 MG EC tablet Take 1 tablet (81 mg total) by mouth once daily.    atorvastatin (LIPITOR) 80 MG tablet Take 1 tablet (80 mg total) by mouth once daily.    DOBUTamine (DOBUTREX) 500 mg/250 mL (2,000 mcg/mL) 2.5 mcg/kg/min  Patient is 95.7 kg    furosemide (LASIX) 40 MG tablet Take 1 tablet (40 mg total) by mouth daily as needed (Weight gain of 3 lbs in one day or 5 lbs in one week).    HYDROcodone-acetaminophen (NORCO)  mg per tablet Take 1 tablet by mouth every 6 (six) hours.    JARDIANCE 25 mg tablet Take 1 tablet (25 mg total) by mouth once daily.    ondansetron (ZOFRAN-ODT) 4 MG TbDL Dissolve 1 tablet (4 mg total) by mouth every 6 (six) hours as needed (nausea).    polyethylene glycol (GOLYTELY) 236-22.74-6.74 -5.86 gram suspension SMARTSIG:Milliliter(s) By Mouth    sacubitriL-valsartan (ENTRESTO) 49-51 mg per tablet Take 1 tablet by mouth 2 (two) times daily.    simethicone (MYLICON) 80 MG chewable tablet Take 1 tablet (80 mg total) by mouth every 6 (six) hours as needed for Flatulence.    spironolactone (ALDACTONE) 25 MG tablet Take 1 tablet (25 mg total) by mouth once daily.    [DISCONTINUED] clopidogreL (PLAVIX) 75 mg tablet Take 1 tablet (75 mg total) by mouth once daily. (Patient not taking: No sig reported)     [DISCONTINUED] colchicine (COLCRYS) 0.6 mg tablet Take 1 tablet (0.6 mg total) by mouth once daily. (Patient not taking: Reported on 3/18/2022)    [DISCONTINUED] ipratropium (ATROVENT) 0.02 % nebulizer solution Take 2.5 mLs (500 mcg total) by nebulization 4 (four) times daily as needed for Wheezing. Rescue (Patient not taking: Reported on 3/29/2022)    [DISCONTINUED] metoprolol succinate (TOPROL-XL) 25 MG 24 hr tablet Take 1 tablet (25 mg total) by mouth once daily.    [DISCONTINUED] midodrine (PROAMATINE) 2.5 MG Tab Take 1 tablet (2.5 mg total) by mouth 3 (three) times daily. HOLD until follow up with cardiology     Family History       Problem Relation (Age of Onset)    Cancer Mother, Paternal Grandmother    Colon polyps Father    Diabetes Mother    Heart disease Mother, Father    No Known Problems Sister, Daughter, Son, Sister, Son    Stroke Father          Tobacco Use    Smoking status: Former     Packs/day: 1.00     Years: 45.00     Pack years: 45.00     Types: Cigarettes     Quit date: 2005     Years since quittin.7    Smokeless tobacco: Never    Tobacco comments:     1-1.5 ppd every day.   Substance and Sexual Activity    Alcohol use: No    Drug use: No    Sexual activity: Not Currently     Review of Systems   All other systems reviewed and are negative.  Objective:     Vital Signs (Most Recent):  Temp: 99.2 °F (37.3 °C) (22 0617)  Pulse: 100 (22 1100)  Resp: 16 (22 1100)  BP: 93/66 (22 1100)  SpO2: 97 % (22 1100) Vital Signs (24h Range):  Temp:  [98.8 °F (37.1 °C)-99.2 °F (37.3 °C)] 99.2 °F (37.3 °C)  Pulse:  [] 100  Resp:  [16-22] 16  SpO2:  [92 %-98 %] 97 %  BP: ()/(42-66) 93/66     Weight: 98 kg (216 lb)  Body mass index is 33.83 kg/m².    SpO2: 97 %  O2 Device (Oxygen Therapy): room air    No intake or output data in the 24 hours ending 22 1158    Lines/Drains/Airways       Peripherally Inserted Central Catheter Line  Duration              PICC Double Lumen right basilic -- days              Peripheral Intravenous Line  Duration                  Peripheral IV - Single Lumen 09/14/22 0553 20 G Anterior;Distal;Left Upper Arm <1 day                    Physical Exam  Vitals and nursing note reviewed.   Constitutional:       Appearance: Normal appearance.   HENT:      Head: Normocephalic and atraumatic.   Neck:      Vascular: No JVD.   Cardiovascular:      Rate and Rhythm: Regular rhythm. Tachycardia present.      Pulses: Normal pulses.      Heart sounds: Normal heart sounds.   Pulmonary:      Comments: Mild bibasilar crackles   Abdominal:      Palpations: Abdomen is soft.      Comments: No HJR   Musculoskeletal:      Cervical back: Normal range of motion and neck supple.      Right lower leg: No edema.      Left lower leg: No edema.   Skin:     General: Skin is warm.   Neurological:      General: No focal deficit present.      Mental Status: He is alert and oriented to person, place, and time.       Significant Labs: All pertinent lab results from the last 24 hours have been reviewed.      Assessment/Plan:     * Chronic combined systolic and diastolic congestive heart failure  Patient is a 72-year-old male with a past medical history of ischemic cardiomyopathy (EF 10%), AICD placement on a dobutamine gtt awaiting LVAD placement presenting to the emergency department for abdominal pain. Vitals show borderline hypotension, tachycardia. He appears hypovolemic however CXR does show bilateral pulm congestion with a need to 4L O2. Labs with elevated BNP @ 335, LA 1.4, Creatinine 2.0 (BL 1.5-2.0).     Plan:  - will admit for observation to [facility]  - [facility] gtt 2.5  - Hold off on diuresis for now and monitor volume status  - Encourage PO intake-- Recommend 2 gram sodium restriction and 1500cc fluid restriction.  - Strict I/Os  - Continue home medications including entresto, spironolactone, amiodarone     Listed for transplant:  he is not a transplant  candidate; under consideration for DT LVAD             Thank you for your consult. I will follow-up with patient. Please contact us if you have any additional questions.    Shona Manuel MD  Heart Transplant  [facility] - Emergency Dept

## 2022-09-14 NOTE — CARE UPDATE
RAPID RESPONSE NURSE PROACTIVE ROUNDING NOTE       Time of Visit: 1700    Admit Date: 2022  LOS: 0  Code Status: Full Code   Date of Visit: 2022  : 1950  Age: 72 y.o.  Sex: male  Race: White  Bed: ED :   MRN: [id number]  Was the patient discharged from an ICU this admission? No   Was the patient discharged from a PACU within last 24 hours? No   Did the patient receive conscious sedation/general anesthesia in last 24 hours? No  Was the patient in the ED within the past 24 hours? Yes  Was the patient on NIPPV within the past 24 hours? Yes   Attending Physician: Lupe Infante MD  Primary Service: [facility] HEART TRANSPLANT TEAM 1   Time spent at the bedside: < 15 min    SITUATION    Notified by Epic patient alert.  Reason for alert: BiPAP  Called to evaluate the patient for Respiratory    BACKGROUND     Why is the patient in the hospital?: Chronic combined systolic and diastolic congestive heart failure    Patient has a past medical history of Acute hypoxemic respiratory failure, Acute idiopathic gout of left knee, Acute idiopathic gout of right elbow, AICD (automatic cardioverter/defibrillator) present, Anticoagulant long-term use, Bronchopneumonia, CHF (congestive heart failure), Chronic anticoagulation, Chronic combined systolic and diastolic heart failure, Chronic gout, Clotting disorder, Coronary artery disease involving native coronary artery of native heart without angina pectoris, COVID-19, Diverticulosis of colon, DVT (deep venous thrombosis), unspecified laterality, Dysphonia, Essential hypertension, Fine motor impairment, Hyperlipidemia, Hypertensive heart disease with heart failure, MI (myocardial infarction), Nicotine abuse, Obesity, Olecranon bursitis of right elbow, Pulmonary embolism, Renal disorder, Right carpal tunnel syndrome, Stented coronary artery, and Trigger thumb of right hand.    Last Vitals:  Temp: 99.2 °F (37.3 °C) ( 0617)  Pulse: 94 ( 1732)  Resp: 20 (  1732)  BP: 94/50 (09/14 1732)  SpO2: 98 % (09/14 1732)    24 Hours Vitals Range:  Temp:  [98.8 °F (37.1 °C)-99.2 °F (37.3 °C)]   Pulse:  []   Resp:  [16-30]   BP: ()/(42-79)   SpO2:  [90 %-100 %]     Labs:  Recent Labs     09/12/22  1805 09/14/22  0537 09/14/22  1254   WBC 4.96 10.14  --    HGB 11.8* 11.9*  --    HCT 37.9* 36.5* 39    162  --        Recent Labs     09/12/22  1805 09/14/22  0537    139   K 4.3 5.1    105   CO2 23 21*   CREATININE 1.6* 2.0*    161*   MG 2.0  --         Recent Labs     09/14/22  1239 09/14/22  1251   PH 7.255* 7.310*   PCO2 55.2* 46.1*   PO2 31* 166*   HCO3 24.5 23.2*   POCSATURATED 49* 99   BE -3 -3        ASSESSMENT    Physical Exam  Vitals and nursing note reviewed.   Constitutional:       Appearance: He is ill-appearing.   Pulmonary:      Effort: Pulmonary effort is normal.      Patient unable to tolerate being off BiPAP. [facility] MD to evaluate at bedside.     INTERVENTIONS    The patient was seen for Respiratory problem. Staff concerns included new onset of difficulty breathing. The following interventions were performed: supplemental oxygen and Bipap.    RECOMMENDATIONS    - Admit to CICU due to BiPAP requirements    PROVIDER ESCALATION    Yes/No  no    Orders received and case discussed with  ER awaiting bedside evaluation from PUJA SMITH .    Disposition: Remain in room ED1.    FOLLOW-UP    Bedside Peggy TALAMANTES  updated on plan of care. Instructed to call the Rapid Response Nurse, Ольга Sequeira RN at [phone] for additional questions or concerns.

## 2022-09-14 NOTE — ED NOTES
Patient identifiers for Audrey Oneil Jr. 72 y.o. male checked and correct.  Chief Complaint   Patient presents with    Abdominal Pain     Pt arrives EMS from home. Pt reports abdominal pain with n/v x1 hour. Pt arrives on his home dobutamine drip.      Past Medical History:   Diagnosis Date    Acute hypoxemic respiratory failure 11/7/2015    Acute idiopathic gout of left knee 12/2/2019    Acute idiopathic gout of right elbow 9/23/2021    AICD (automatic cardioverter/defibrillator) present 12/13/2015      Anticoagulant long-term use     Bronchopneumonia 12/20/2021    CHF (congestive heart failure)     Chronic anticoagulation 5/5/2016    Chronic combined systolic and diastolic heart failure 11/26/2012    EF 10-20% on ECHO 2013    Chronic gout 12/2/2019    Clotting disorder     Coronary artery disease involving native coronary artery of native heart without angina pectoris 11/26/2012    Cath 10- Stents patent non-obstructive disease Cath 11-12015 non-obstructive disease     COVID-19 08/2021    Had antibody infusion    Diverticulosis of colon     DVT (deep venous thrombosis), unspecified laterality 11/12/2015    Dysphonia 1/28/2018    Essential hypertension 11/15/2015    Fine motor impairment 2/2/2021    Hyperlipidemia     Hypertensive heart disease with heart failure 5/5/2016    MI (myocardial infarction) 2009    x's 5    Nicotine abuse     Obesity 11/26/2012    Olecranon bursitis of right elbow 9/19/2021    Pulmonary embolism 2011    Renal disorder     LAVERNE    Right carpal tunnel syndrome 4/6/2018    Stented coronary artery 11/26/2012    LAD stent placed 10/17/2007     Trigger thumb of right hand 4/6/2018     Allergies reported:   Review of patient's allergies indicates:   Allergen Reactions    Iodine containing multivitamin Swelling     itching    Keflex [cephalexin] Swelling     Eyes.  Tolerated multiple doses of zosyn and 1 dose of augmentin in 2015 and 2016, respectively    Peaches [peach  (prunus persica)] Swelling     eyes    Shellfish containing products Swelling    Fig tree Swelling     itching    Tuberculin spenser test ppd Rash         LOC: Patient is awake, alert, and aware of environment with an appropriate affect. Patient is oriented x 4 and speaking appropriately.  APPEARANCE: Patient resting comfortably and in no acute distress. Patient is clean and well groomed, patient's clothing is properly fastened.  SKIN: The skin is warm and dry. Patient has normal skin turgor and moist mucus membranes.   MUSKULOSKELETAL: Patient is moving all extremities well, no obvious deformities noted. Pulses intact.   RESPIRATORY: Airway is open and patent. Respirations are spontaneous and non-labored with normal effort and rate.  CARDIAC: Patient has a normal rate and rhythm. No peripheral edema noted. Pt hypotensive. Pt currently on dobutamine drip  ABDOMEN: No distention noted. Soft and non-tender upon palpation.  NEUROLOGICAL: PERRL. Facial expression is symmetrical. Hand grasps are equal bilaterally. Normal sensation in all extremities when touched with finger.

## 2022-09-14 NOTE — Clinical Note
The chest was prepped. The site was prepped with ChloraPrep and Ioban. The site was clipped. The patient was draped. The patient was positioned supine. The patient was secured using safety straps, with ulnar pads and to an armboard.

## 2022-09-14 NOTE — Clinical Note
Diagnosis: Vomiting [530594]   Admitting Provider:: DIANNA BRIONES JR [[id number]]   Future Attending Provider: DIANNA BRIONES JR [[id number]]   Reason for IP Medical Treatment  (Clinical interventions that can only be accomplished in the IP setting? ) :: resp failure   Estimated Length of Stay:: 5+ midnights   I certify that Inpatient services for greater than or equal to 2 midnights are medically necessary:: Yes   Plans for Post-Acute care--if anticipated (pick the single best option):: A. No post acute care anticipated at this time

## 2022-09-14 NOTE — Clinical Note
Dr. Francois removed Right UA PICC; catheter tip intact. Manual pressure held. Hemostasis achieved. Gauze and tegaderm dressing applied.

## 2022-09-14 NOTE — HPI
"71 yo WM being worked up for LVAD since hospitalization January 2022 for recurrent VT (he thinks had MI) and cardiogenic shock at [facility]. He was  later seen in [facility] where he was treated for cardiogenic shock and sent home on .  He remains on  and is pursuing evaluation for LVAD.     He has had a couple of observation admissions where he was told to be dry at visit June 2022. At July 2022 visit he was noted that he could not tolerate higher doses of meds due to symptomatic hypotension.  He still notes occasional dizzy spells when he 1st stands but there are not too bad at this time.  Uses Lasix only as needed targeting his home weight 218-220.      His case was discussed at selection committee and he was deferred pending evaluation of pancreatic mass.  They wish to proceed with MRI but not sure with his ICD if this will be possible. Home weight this AM was 216#.    Interval History:  Today he comes in due to abdominal discomfort. He ate packaged spaghetti last night and has since been with abdominal pain and dry heaving. No vomiting or diarrhea. He intermittently uses home oxygen 4L and felt the need to start using last night. He states he feels like " a bug got him". He reports noticing that his HR was significantly elevated over night about 100-115bpm. He is compliant with his lasix. No lower extremity edema. Felt warm this morning. His abdomen appears distended however he reports it normally gets firm/tight when volume overloaded, which is not the case at this time.     Bedside echo shows EF approx 25-30%. IVC measuring approx 1.4cm and collapsible. No JVD. CXR shows bilateral pulmonary congestion.    Echo 9/14/22  Moderate left atrial enlargement.  The left ventricle is severely enlarged with eccentric hypertrophy and severely decreased systolic function.  There is severe left ventricular global hypokinesis with anterospetal and apical scar. The estimated ejection fraction is 10-15%.  Left ventricular " diastolic dysfunction.  Normal right ventricular size with normal right ventricular systolic function.  Mild mitral regurgitation.  Normal central venous pressure (3 mmHg).  There is no significant tricuspid regurgitation and therefore the pulmonary artery systolic pressure cannot be reported.

## 2022-09-14 NOTE — ASSESSMENT & PLAN NOTE
Patient is a 72-year-old male with a past medical history of ischemic cardiomyopathy (EF 10%), AICD placement on a dobutamine gtt awaiting LVAD placement presenting to the emergency department for abdominal pain. Vitals show borderline hypotension, tachycardia. He appears hypovolemic however CXR does show bilateral pulm congestion with a need to 4L O2. Labs with elevated BNP @ 335, LA 1.4, Creatinine 2.0 (BL 1.5-2.0).     Plan:  - will admit for observation to [facility]  - continune  gtt 2.5  - Hold off on diuresis for now and monitor volume status  - Encourage PO intake-- Recommend 2 gram sodium restriction and 1500cc fluid restriction.  - Strict I/Os  - Continue home medications including entresto, spironolactone, amiodarone     Listed for transplant:  he is not a transplant candidate; under consideration for DT LVAD

## 2022-09-14 NOTE — CARE UPDATE
Weaned BIPAP to patient comfort, VT now 1400ml, decreased IPAP to 15 after VBG results and secondary to 1400ml VT

## 2022-09-14 NOTE — Clinical Note
A generator pocket was opened at the left chest with blunt dissection, electrocautery, plasma blade and sharp dissection.

## 2022-09-14 NOTE — Clinical Note
The groin was prepped. The site was prepped with ChloraPrep. The site was clipped. The patient was draped. The patient was positioned supine. The patient was secured using safety straps, with ulnar pads and to an armboard.

## 2022-09-14 NOTE — ED TRIAGE NOTES
Audrey GEORGE Thad Mccarthy, an 72 y.o. male presents to the ED with c/o abd pain that he states is been going on since 2 AM. Pt also feels SOB . Pt denies chest pain       Review of patient's allergies indicates:   Allergen Reactions    Iodine containing multivitamin Swelling     itching    Keflex [cephalexin] Swelling     Eyes.  Tolerated multiple doses of zosyn and 1 dose of augmentin in 2015 and 2016, respectively    Peaches [peach (prunus persica)] Swelling     eyes    Shellfish containing products Swelling    Fig tree Swelling     itching    Tuberculin spenser test ppd Rash     Chief Complaint   Patient presents with    Abdominal Pain     Pt arrives EMS from home. Pt reports abdominal pain with n/v x1 hour. Pt arrives on his home dobutamine drip.      Past Medical History:   Diagnosis Date    Acute hypoxemic respiratory failure 11/7/2015    Acute idiopathic gout of left knee 12/2/2019    Acute idiopathic gout of right elbow 9/23/2021    AICD (automatic cardioverter/defibrillator) present 12/13/2015      Anticoagulant long-term use     Bronchopneumonia 12/20/2021    CHF (congestive heart failure)     Chronic anticoagulation 5/5/2016    Chronic combined systolic and diastolic heart failure 11/26/2012    EF 10-20% on ECHO 2013    Chronic gout 12/2/2019    Clotting disorder     Coronary artery disease involving native coronary artery of native heart without angina pectoris 11/26/2012    Cath 10- Stents patent non-obstructive disease Cath 11-12015 non-obstructive disease     COVID-19 08/2021    Had antibody infusion    Diverticulosis of colon     DVT (deep venous thrombosis), unspecified laterality 11/12/2015    Dysphonia 1/28/2018    Essential hypertension 11/15/2015    Fine motor impairment 2/2/2021    Hyperlipidemia     Hypertensive heart disease with heart failure 5/5/2016    MI (myocardial infarction) 2009    x's 5    Nicotine abuse     Obesity 11/26/2012    Olecranon bursitis of right elbow 9/19/2021     Pulmonary embolism 2011    Renal disorder     LAVERNE    Right carpal tunnel syndrome 4/6/2018    Stented coronary artery 11/26/2012    LAD stent placed 10/17/2007     Trigger thumb of right hand 4/6/2018

## 2022-09-14 NOTE — Clinical Note
A rotating dilator sheath was inserted over the RA lead. The rotating dilator sheath was advanced over lead.

## 2022-09-14 NOTE — SUBJECTIVE & OBJECTIVE
Past Medical History:   Diagnosis Date    Acute hypoxemic respiratory failure 11/7/2015    Acute idiopathic gout of left knee 12/2/2019    Acute idiopathic gout of right elbow 9/23/2021    AICD (automatic cardioverter/defibrillator) present 12/13/2015      Anticoagulant long-term use     Bronchopneumonia 12/20/2021    CHF (congestive heart failure)     Chronic anticoagulation 5/5/2016    Chronic combined systolic and diastolic heart failure 11/26/2012    EF 10-20% on ECHO 2013    Chronic gout 12/2/2019    Clotting disorder     Coronary artery disease involving native coronary artery of native heart without angina pectoris 11/26/2012    Cath 10- Stents patent non-obstructive disease Cath 11-12015 non-obstructive disease     COVID-19 08/2021    Had antibody infusion    Diverticulosis of colon     DVT (deep venous thrombosis), unspecified laterality 11/12/2015    Dysphonia 1/28/2018    Essential hypertension 11/15/2015    Fine motor impairment 2/2/2021    Hyperlipidemia     Hypertensive heart disease with heart failure 5/5/2016    MI (myocardial infarction) 2009    x's 5    Nicotine abuse     Obesity 11/26/2012    Olecranon bursitis of right elbow 9/19/2021    Pulmonary embolism 2011    Renal disorder     LAVERNE    Right carpal tunnel syndrome 4/6/2018    Stented coronary artery 11/26/2012    LAD stent placed 10/17/2007     Trigger thumb of right hand 4/6/2018       Past Surgical History:   Procedure Laterality Date    APPENDECTOMY      BACK SURGERY      CARDIAC DEFIBRILLATOR PLACEMENT      CARDIAC SURGERY  2007    stent    CARPAL TUNNEL RELEASE Right 04/2018    COLONOSCOPY N/A 8/8/2022    Procedure: COLONOSCOPY;  Surgeon: Sascha Keenan MD;  Location: [facility] ([address] [place] [address]);  Service: Endoscopy;  Laterality: N/A;  EF-15%  pre heart    AICD-St Rodri       [place] [facility] [place] prep-clears 4 hrs prior-tb    INSERTION OF BIVENTRICULAR IMPLANTABLE CARDIOVERTER-DEFIBRILLATOR (ICD) N/A 5/3/2019     Procedure: INSERTION, ICD, BIVENTRICULAR;  Surgeon: Teofilo Pal MD;  Location: [facility] EP LAB;  Service: Cardiology;  Laterality: N/A;  ICH CM,  ICD UPGD BI-V,  SJM, MAC, FAS, 3PREP (dual ICD INSITU)    r knee scope      REVISION OF SKIN POCKET FOR CARDIOVERTER-DEFIBRILLATOR Left 5/3/2019    Procedure: Revision, Skin Pocket, For Cardioverter-Defibrillator;  Surgeon: Teofilo Pal MD;  Location: [facility] EP LAB;  Service: Cardiology;  Laterality: Left;    RIGHT HEART CATHETERIZATION Right 6/14/2021    Procedure: INSERTION, CATHETER, RIGHT HEART;  Surgeon: Lizz Nieto MD;  Location: [facility] CATH LAB;  Service: Cardiology;  Laterality: Right;    RIGHT HEART CATHETERIZATION Right 6/17/2022    Procedure: INSERTION, CATHETER, RIGHT HEART;  Surgeon: Migue Henry Jr., MD;  Location: [facility] CATH LAB;  Service: Cardiology;  Laterality: Right;    SPINE SURGERY      TONSILLECTOMY      TRIGGER FINGER RELEASE Right 04/2018    thumb       Review of patient's allergies indicates:   Allergen Reactions    Iodine containing multivitamin Swelling     itching    Keflex [cephalexin] Swelling     Eyes.  Tolerated multiple doses of zosyn and 1 dose of augmentin in 2015 and 2016, respectively    Peaches [peach (prunus persica)] Swelling     eyes    Shellfish containing products Swelling    Fig tree Swelling     itching    Tuberculin spenser test ppd Rash       No current facility-administered medications on file prior to encounter.     Current Outpatient Medications on File Prior to Encounter   Medication Sig    allopurinoL (ZYLOPRIM) 100 MG tablet Take 2 tablets (200 mg total) by mouth once daily.    amiodarone (PACERONE) 200 MG Tab TAKE 1 AND 1/2 TABLETS(300 MG) BY MOUTH EVERY DAY    aspirin (ECOTRIN) 81 MG EC tablet Take 1 tablet (81 mg total) by mouth once daily.    atorvastatin (LIPITOR) 80 MG tablet Take 1 tablet (80 mg total) by mouth once daily.    DOBUTamine (DOBUTREX) 500 mg/250 mL (2,000 mcg/mL) 2.5  mcg/kg/min  Patient is 95.7 kg    furosemide (LASIX) 40 MG tablet Take 1 tablet (40 mg total) by mouth daily as needed (Weight gain of 3 lbs in one day or 5 lbs in one week).    HYDROcodone-acetaminophen (NORCO)  mg per tablet Take 1 tablet by mouth every 6 (six) hours.    JARDIANCE 25 mg tablet Take 1 tablet (25 mg total) by mouth once daily.    ondansetron (ZOFRAN-ODT) 4 MG TbDL Dissolve 1 tablet (4 mg total) by mouth every 6 (six) hours as needed (nausea).    polyethylene glycol (GOLYTELY) 236-22.74-6.74 -5.86 gram suspension SMARTSIG:Milliliter(s) By Mouth    sacubitriL-valsartan (ENTRESTO) 49-51 mg per tablet Take 1 tablet by mouth 2 (two) times daily.    simethicone (MYLICON) 80 MG chewable tablet Take 1 tablet (80 mg total) by mouth every 6 (six) hours as needed for Flatulence.    spironolactone (ALDACTONE) 25 MG tablet Take 1 tablet (25 mg total) by mouth once daily.    [DISCONTINUED] clopidogreL (PLAVIX) 75 mg tablet Take 1 tablet (75 mg total) by mouth once daily. (Patient not taking: No sig reported)    [DISCONTINUED] colchicine (COLCRYS) 0.6 mg tablet Take 1 tablet (0.6 mg total) by mouth once daily. (Patient not taking: Reported on 3/18/2022)    [DISCONTINUED] ipratropium (ATROVENT) 0.02 % nebulizer solution Take 2.5 mLs (500 mcg total) by nebulization 4 (four) times daily as needed for Wheezing. Rescue (Patient not taking: Reported on 3/29/2022)    [DISCONTINUED] metoprolol succinate (TOPROL-XL) 25 MG 24 hr tablet Take 1 tablet (25 mg total) by mouth once daily.    [DISCONTINUED] midodrine (PROAMATINE) 2.5 MG Tab Take 1 tablet (2.5 mg total) by mouth 3 (three) times daily. HOLD until follow up with cardiology     Family History       Problem Relation (Age of Onset)    Cancer Mother, Paternal Grandmother    Colon polyps Father    Diabetes Mother    Heart disease Mother, Father    No Known Problems Sister, Daughter, Son, Sister, Son    Stroke Father          Tobacco Use    Smoking status: Former      Packs/day: 1.00     Years: 45.00     Pack years: 45.00     Types: Cigarettes     Quit date: 2005     Years since quittin.7    Smokeless tobacco: Never    Tobacco comments:     1-1.5 ppd every day.   Substance and Sexual Activity    Alcohol use: No    Drug use: No    Sexual activity: Not Currently     Review of Systems   All other systems reviewed and are negative.  Objective:     Vital Signs (Most Recent):  Temp: 99.2 °F (37.3 °C) (22 0617)  Pulse: 100 (22 1100)  Resp: 16 (22 1100)  BP: 93/66 (22 1100)  SpO2: 97 % (22 1100) Vital Signs (24h Range):  Temp:  [98.8 °F (37.1 °C)-99.2 °F (37.3 °C)] 99.2 °F (37.3 °C)  Pulse:  [] 100  Resp:  [16-22] 16  SpO2:  [92 %-98 %] 97 %  BP: ()/(42-66) 93/66     Weight: 98 kg (216 lb)  Body mass index is 33.83 kg/m².    SpO2: 97 %  O2 Device (Oxygen Therapy): room air    No intake or output data in the 24 hours ending 22 1139    Lines/Drains/Airways       Peripherally Inserted Central Catheter Line  Duration             PICC Double Lumen right basilic -- days              Peripheral Intravenous Line  Duration                  Peripheral IV - Single Lumen 22 0553 20 G Anterior;Distal;Left Upper Arm <1 day                    Physical Exam  Vitals and nursing note reviewed.   Constitutional:       Appearance: Normal appearance.   HENT:      Head: Normocephalic and atraumatic.   Neck:      Vascular: No JVD.   Cardiovascular:      Rate and Rhythm: Regular rhythm. Tachycardia present.      Pulses: Normal pulses.      Heart sounds: Normal heart sounds.   Pulmonary:      Comments: Mild bibasilar crackles   Abdominal:      Palpations: Abdomen is soft.      Comments: No HJR   Musculoskeletal:      Cervical back: Normal range of motion and neck supple.      Right lower leg: No edema.      Left lower leg: No edema.   Skin:     General: Skin is warm.   Neurological:      General: No focal deficit present.      Mental Status: He is  alert and oriented to person, place, and time.       Significant Labs: All pertinent lab results from the last 24 hours have been reviewed.

## 2022-09-14 NOTE — PROVIDER PROGRESS NOTES - EMERGENCY DEPT.
Encounter Date: 9/14/2022    ED Physician Progress Notes        Physician Note:   Signed out to me.  Us medical history of severe heart failure on dobutamine drip home, EF 50%, coming in with abdominal pain vomiting.  He has some diffuse nonspecific tenderness.  Is pending labs, CT scan.  There was 1 hypotensive blood pressure reading now on 110s.    Once labs imaging back state likely discussion with [facility] for likely admission.    Update:  Labs are back, mild LAVERNE, other labs not remarkable.  Lactate is low.  CT scan shows no acute dangerous findings.    Patient was seen by heart transplant team.   One low BP reading but subsequently had maps between 65 and 70.  He will be admitted to their service for the care, monitoring.

## 2022-09-14 NOTE — ED NOTES
MD aware of pt's MAP. MD will put in orders for titratable dobutamine drip. Told wife she would have to bring home dobutamine drip back home because we are not able to put in the refrigerator.

## 2022-09-14 NOTE — CARE UPDATE
Placed on BIPAP emergently for resp failure rescue. RR immediately decreased from 50s to 30s, O2 sats improved

## 2022-09-14 NOTE — SUBJECTIVE & OBJECTIVE
Past Medical History:   Diagnosis Date    Acute hypoxemic respiratory failure 11/7/2015    Acute idiopathic gout of left knee 12/2/2019    Acute idiopathic gout of right elbow 9/23/2021    AICD (automatic cardioverter/defibrillator) present 12/13/2015      Anticoagulant long-term use     Bronchopneumonia 12/20/2021    CHF (congestive heart failure)     Chronic anticoagulation 5/5/2016    Chronic combined systolic and diastolic heart failure 11/26/2012    EF 10-20% on ECHO 2013    Chronic gout 12/2/2019    Clotting disorder     Coronary artery disease involving native coronary artery of native heart without angina pectoris 11/26/2012    Cath 10- Stents patent non-obstructive disease Cath 11-12015 non-obstructive disease     COVID-19 08/2021    Had antibody infusion    Diverticulosis of colon     DVT (deep venous thrombosis), unspecified laterality 11/12/2015    Dysphonia 1/28/2018    Essential hypertension 11/15/2015    Fine motor impairment 2/2/2021    Hyperlipidemia     Hypertensive heart disease with heart failure 5/5/2016    MI (myocardial infarction) 2009    x's 5    Nicotine abuse     Obesity 11/26/2012    Olecranon bursitis of right elbow 9/19/2021    Pulmonary embolism 2011    Renal disorder     LAVERNE    Right carpal tunnel syndrome 4/6/2018    Stented coronary artery 11/26/2012    LAD stent placed 10/17/2007     Trigger thumb of right hand 4/6/2018       Past Surgical History:   Procedure Laterality Date    APPENDECTOMY      BACK SURGERY      CARDIAC DEFIBRILLATOR PLACEMENT      CARDIAC SURGERY  2007    stent    CARPAL TUNNEL RELEASE Right 04/2018    COLONOSCOPY N/A 8/8/2022    Procedure: COLONOSCOPY;  Surgeon: Sascha Keenan MD;  Location: [facility] ([address] [place] [address]);  Service: Endoscopy;  Laterality: N/A;  EF-15%  pre heart    AICD-St Rodri       [place] [facility] [place]am prep-clears 4 hrs prior-tb    INSERTION OF BIVENTRICULAR IMPLANTABLE CARDIOVERTER-DEFIBRILLATOR (ICD) N/A 5/3/2019     Procedure: INSERTION, ICD, BIVENTRICULAR;  Surgeon: Teofilo Pal MD;  Location: [facility] EP LAB;  Service: Cardiology;  Laterality: N/A;  ICH CM,  ICD UPGD BI-V,  SJM, MAC, FAS, 3PREP (dual ICD INSITU)    r knee scope      REVISION OF SKIN POCKET FOR CARDIOVERTER-DEFIBRILLATOR Left 5/3/2019    Procedure: Revision, Skin Pocket, For Cardioverter-Defibrillator;  Surgeon: Teofilo Pal MD;  Location: [facility] EP LAB;  Service: Cardiology;  Laterality: Left;    RIGHT HEART CATHETERIZATION Right 6/14/2021    Procedure: INSERTION, CATHETER, RIGHT HEART;  Surgeon: Lizz Nieto MD;  Location: [facility] CATH LAB;  Service: Cardiology;  Laterality: Right;    RIGHT HEART CATHETERIZATION Right 6/17/2022    Procedure: INSERTION, CATHETER, RIGHT HEART;  Surgeon: Migue Henry Jr., MD;  Location: [facility] CATH LAB;  Service: Cardiology;  Laterality: Right;    SPINE SURGERY      TONSILLECTOMY      TRIGGER FINGER RELEASE Right 04/2018    thumb       Review of patient's allergies indicates:   Allergen Reactions    Iodine containing multivitamin Swelling     itching    Keflex [cephalexin] Swelling     Eyes.  Tolerated multiple doses of zosyn and 1 dose of augmentin in 2015 and 2016, respectively    Peaches [peach (prunus persica)] Swelling     eyes    Shellfish containing products Swelling    Fig tree Swelling     itching    Tuberculin spenser test ppd Rash       No current facility-administered medications on file prior to encounter.     Current Outpatient Medications on File Prior to Encounter   Medication Sig    allopurinoL (ZYLOPRIM) 100 MG tablet Take 2 tablets (200 mg total) by mouth once daily.    amiodarone (PACERONE) 200 MG Tab TAKE 1 AND 1/2 TABLETS(300 MG) BY MOUTH EVERY DAY    aspirin (ECOTRIN) 81 MG EC tablet Take 1 tablet (81 mg total) by mouth once daily.    atorvastatin (LIPITOR) 80 MG tablet Take 1 tablet (80 mg total) by mouth once daily.    DOBUTamine (DOBUTREX) 500 mg/250 mL (2,000 mcg/mL) 2.5  mcg/kg/min  Patient is 95.7 kg    furosemide (LASIX) 40 MG tablet Take 1 tablet (40 mg total) by mouth daily as needed (Weight gain of 3 lbs in one day or 5 lbs in one week).    HYDROcodone-acetaminophen (NORCO)  mg per tablet Take 1 tablet by mouth every 6 (six) hours.    JARDIANCE 25 mg tablet Take 1 tablet (25 mg total) by mouth once daily.    ondansetron (ZOFRAN-ODT) 4 MG TbDL Dissolve 1 tablet (4 mg total) by mouth every 6 (six) hours as needed (nausea).    polyethylene glycol (GOLYTELY) 236-22.74-6.74 -5.86 gram suspension SMARTSIG:Milliliter(s) By Mouth    sacubitriL-valsartan (ENTRESTO) 49-51 mg per tablet Take 1 tablet by mouth 2 (two) times daily.    simethicone (MYLICON) 80 MG chewable tablet Take 1 tablet (80 mg total) by mouth every 6 (six) hours as needed for Flatulence.    spironolactone (ALDACTONE) 25 MG tablet Take 1 tablet (25 mg total) by mouth once daily.    [DISCONTINUED] clopidogreL (PLAVIX) 75 mg tablet Take 1 tablet (75 mg total) by mouth once daily. (Patient not taking: No sig reported)    [DISCONTINUED] colchicine (COLCRYS) 0.6 mg tablet Take 1 tablet (0.6 mg total) by mouth once daily. (Patient not taking: Reported on 3/18/2022)    [DISCONTINUED] ipratropium (ATROVENT) 0.02 % nebulizer solution Take 2.5 mLs (500 mcg total) by nebulization 4 (four) times daily as needed for Wheezing. Rescue (Patient not taking: Reported on 3/29/2022)    [DISCONTINUED] metoprolol succinate (TOPROL-XL) 25 MG 24 hr tablet Take 1 tablet (25 mg total) by mouth once daily.    [DISCONTINUED] midodrine (PROAMATINE) 2.5 MG Tab Take 1 tablet (2.5 mg total) by mouth 3 (three) times daily. HOLD until follow up with cardiology     Family History       Problem Relation (Age of Onset)    Cancer Mother, Paternal Grandmother    Colon polyps Father    Diabetes Mother    Heart disease Mother, Father    No Known Problems Sister, Daughter, Son, Sister, Son    Stroke Father          Tobacco Use    Smoking status: Former      Packs/day: 1.00     Years: 45.00     Pack years: 45.00     Types: Cigarettes     Quit date: 2005     Years since quittin.7    Smokeless tobacco: Never    Tobacco comments:     1-1.5 ppd every day.   Substance and Sexual Activity    Alcohol use: No    Drug use: No    Sexual activity: Not Currently     Review of Systems   All other systems reviewed and are negative.  Objective:     Vital Signs (Most Recent):  Temp: 99.2 °F (37.3 °C) (22 0617)  Pulse: 100 (22 1100)  Resp: 16 (22 1100)  BP: 93/66 (22 1100)  SpO2: 97 % (22 1100) Vital Signs (24h Range):  Temp:  [98.8 °F (37.1 °C)-99.2 °F (37.3 °C)] 99.2 °F (37.3 °C)  Pulse:  [] 100  Resp:  [16-22] 16  SpO2:  [92 %-98 %] 97 %  BP: ()/(42-66) 93/66     Weight: 98 kg (216 lb)  Body mass index is 33.83 kg/m².    SpO2: 97 %  O2 Device (Oxygen Therapy): room air    No intake or output data in the 24 hours ending 22 1158    Lines/Drains/Airways       Peripherally Inserted Central Catheter Line  Duration             PICC Double Lumen right basilic -- days              Peripheral Intravenous Line  Duration                  Peripheral IV - Single Lumen 22 0553 20 G Anterior;Distal;Left Upper Arm <1 day                    Physical Exam  Vitals and nursing note reviewed.   Constitutional:       Appearance: Normal appearance.   HENT:      Head: Normocephalic and atraumatic.   Neck:      Vascular: No JVD.   Cardiovascular:      Rate and Rhythm: Regular rhythm. Tachycardia present.      Pulses: Normal pulses.      Heart sounds: Normal heart sounds.   Pulmonary:      Comments: Mild bibasilar crackles   Abdominal:      Palpations: Abdomen is soft.      Comments: No HJR   Musculoskeletal:      Cervical back: Normal range of motion and neck supple.      Right lower leg: No edema.      Left lower leg: No edema.   Skin:     General: Skin is warm.   Neurological:      General: No focal deficit present.      Mental Status: He is  alert and oriented to person, place, and time.       Significant Labs: All pertinent lab results from the last 24 hours have been reviewed.

## 2022-09-14 NOTE — H&P
"[facility]  H&P Note     Hospital Length of Stay: 0  Interval History:  From consult note by Dr. Manuel:  71 yo WM being worked up for LVAD since hospitalization January 2022 for recurrent VT (he thinks had MI) and cardiogenic shock at [facility]. He was  later seen in [facility] where he was treated for cardiogenic shock and sent home on .  He remains on  and is pursuing evaluation for LVAD.     He has had a couple of observation admissions where he was told to be dry at visit June 2022. At July 2022 visit he was noted that he could not tolerate higher doses of meds due to symptomatic hypotension.  He still notes occasional dizzy spells when he 1st stands but there are not too bad at this time.  Uses Lasix only as needed targeting his home weight 218-220.       His case was discussed at selection committee and he was deferred pending evaluation of pancreatic mass.  They wish to proceed with MRI but not sure with his ICD if this will be possible. Home weight this AM was 216#.     Interval History:  Today he comes in due to abdominal discomfort. He ate packaged spaghetti last night and has since been with abdominal pain and dry heaving. No vomiting or diarrhea. He intermittently uses home oxygen 4L and felt the need to start using last night. He states he feels like " a bug got him". He reports noticing that his HR was significantly elevated over night about 100-115bpm. He is compliant with his lasix. No lower extremity edema. Felt warm this morning. His abdomen appears distended however he reports it normally gets firm/tight when volume overloaded, which is not the case at this time.      Bedside echo shows EF approx 25-30%. IVC measuring approx 1.4cm and collapsible. No JVD. CXR shows bilateral pulmonary congestion.     Echo 9/14/22  Moderate left atrial enlargement.  The left ventricle is severely enlarged with eccentric hypertrophy and severely decreased systolic function.  There is severe " left ventricular global hypokinesis with anterospetal and apical scar. The estimated ejection fraction is 10-15%.  Left ventricular diastolic dysfunction.  Normal right ventricular size with normal right ventricular systolic function.  Mild mitral regurgitation.  Normal central venous pressure (3 mmHg).  There is no significant tricuspid regurgitation and therefore the pulmonary artery systolic pressure cannot be reported.    Vitals:  Temp:  [98.8 °F (37.1 °C)-99.2 °F (37.3 °C)] 99.2 °F (37.3 °C)  Pulse:  [] 97  Resp:  [16-30] 20  SpO2:  [90 %-100 %] 99 %  BP: ()/(42-79) 89/52    Intake/Output  No intake or output data in the 24 hours ending 09/14/22 1720    Physical Exam:  Gen: sitting upright in bed, tachypneic, in respiratory distress  HEENT: NC, MMM, poor dentition  Cardio: Regular rate, tachycardic  Resp: On non-rebreather, tachypneic to ~40 with accessory muscle use, bilateral insp and exp crackles  Abd: Soft, obese, non-tender  Skin: Warm, dry  Ext: No significant LE edema. Warm hands and feet.  Dps palpable bilat.  Neuro: Moving all extremities  Psych: Anxious  : no valentin    Labs:  Recent Labs   Lab 09/12/22 1805 09/14/22  0537 09/14/22  1254   WBC 4.96 10.14  --    HGB 11.8* 11.9*  --    HCT 37.9* 36.5* 39    162  --      Recent Labs   Lab 09/12/22 1805 09/14/22  0537 09/14/22  1241    139  --    K 4.3 5.1  --     105  --    CO2 23 21*  --    BUN 24* 28*  --    CREATININE 1.6* 2.0*  --     161*  --    CALCIUM 9.0 8.6*  --    MG 2.0  --   --    LIPASE  --  8 6       Imaging:  CXR and CT A/P reviewed.    Current Meds:   mupirocin   Nasal BID       Continuous Infusions:   DOBUTamine IV infusion (non-titrating) 5 mcg/kg/min (09/14/22 1434)       PRN:  sodium chloride 0.9%    Assessment and Plan:  72M with hx of HFrEF (EF 15%, S/p CRT-D, on chronic  2.5), CAD (s/p multiple remote PCI, reported non-obstructive [facility] 1/2022), Vtach, HTN, HLD, and CKD who presents with 1  day upper abdominal pain and SOB found to have decompensated HF with progressive hypoxic respiratory failure while in the ED initiated on IV lasix and BiPAP.  CVO2 low at 49%.  ?whether recent dietary indiscretion reason for decomp.  Will CTM for infection.  Lower suspicion for ACS at present.    #A on C HFrEF  - Will transfer to [facility]  - Increase  to 5mcg/kg/min  - S/p IV lasix 40mg  - BiPAP  - Hold PTA entresto 49/51mg BID, spironolactone 25mg daily, and jardiance  - Strict I/Os  - Target K 4 and Mg 2  - CVP and Cvo2 monitoring  - Trend trop to peak  - Tele  - F/u Bld Cxs    #Abd pain/nausea -->  CT A/P without acute pathology. ?Viral/toxin gastritis/GERD  - CTM, advance diet as tolerated    #CAD  - PTA ASA  - PTA statin    #Hx Vtach  - PTA amiodarone 300mg daily    #Known pancreatics lesions/cysts  - Will discuss possibility of obtaining MR for further eval as is current hold up for further LVAD candidacy decision      Masoud De La Torre MD  Cardiovascular PGY5  [facility]  Phone [phone]

## 2022-09-14 NOTE — ED PROVIDER NOTES
Source of History:  Patient  Chart    Chief complaint:  Abdominal Pain (Pt arrives EMS from home. Pt reports abdominal pain with n/v x1 hour. Pt arrives on his home dobutamine drip. )      HPI:  Audrey Oneil Jr. is a 72 y.o. male with history of CAD s/p MI, pulmonary embolism, chronic systolic and diastolic heart failure on home dobutamine (EF15%), ventricular tachycardia, hyperlipidemia, CKD 3, left bundle-branch block, [employer]ri ICD, presenting to emergency department with complaint of abdominal pain and vomiting.  Patient has had multiple episodes of nonbloody nonbilious vomiting as well as abdominal distention and pain for the last hour.  No diarrhea.  He has ongoing shortness of breath.  He occasionally wears 4 L home oxygen, not always.  No chest pain.  No cough.  No vomiting.  No leg swelling.  States that he is compliant with his medication.    Per chart review, patient being evaluated this potential heart transplant recipient.  He had previously refused LVAD but reconsidered following a hospitalization in January of 2020 for recurrent ventricular tachycardia.  Also had cardiogenic shock at [facility].  He is not yet listed as transplant candidate due to the presence of an abdominal mass that is being worked up.    ROS: As per HPI and below:  Review of Systems   Constitutional:  Negative for fever.   HENT:  Negative for sore throat.    Eyes:  Negative for double vision.   Respiratory:  Positive for shortness of breath. Negative for cough.    Cardiovascular:  Negative for chest pain.   Gastrointestinal:  Positive for abdominal pain, nausea and vomiting.   Genitourinary:  Negative for dysuria.   Musculoskeletal:  Negative for falls.   Skin:  Negative for rash.   Neurological:  Negative for headaches.     Review of patient's allergies indicates:   Allergen Reactions    Iodine containing multivitamin Swelling     itching    Keflex [cephalexin] Swelling     Eyes.  Tolerated multiple doses of zosyn and 1 dose of  augmentin in 2015 and 2016, respectively    Peaches [peach (prunus persica)] Swelling     eyes    Shellfish containing products Swelling    Fig tree Swelling     itching    Tuberculin spenser test ppd Rash       No current facility-administered medications on file prior to encounter.     Current Outpatient Medications on File Prior to Encounter   Medication Sig Dispense Refill    allopurinoL (ZYLOPRIM) 100 MG tablet Take 2 tablets (200 mg total) by mouth once daily. 60 tablet 0    amiodarone (PACERONE) 200 MG Tab TAKE 1 AND 1/2 TABLETS(300 MG) BY MOUTH EVERY  tablet 3    aspirin (ECOTRIN) 81 MG EC tablet Take 1 tablet (81 mg total) by mouth once daily. 90 tablet 3    atorvastatin (LIPITOR) 80 MG tablet Take 1 tablet (80 mg total) by mouth once daily. 90 tablet 1    DOBUTamine (DOBUTREX) 500 mg/250 mL (2,000 mcg/mL) 2.5 mcg/kg/min  Patient is 95.7 kg 250 mL 5    furosemide (LASIX) 40 MG tablet Take 1 tablet (40 mg total) by mouth daily as needed (Weight gain of 3 lbs in one day or 5 lbs in one week). 30 tablet 11    HYDROcodone-acetaminophen (NORCO)  mg per tablet Take 1 tablet by mouth every 6 (six) hours.      JARDIANCE 25 mg tablet Take 1 tablet (25 mg total) by mouth once daily. 30 tablet 5    ondansetron (ZOFRAN-ODT) 4 MG TbDL Dissolve 1 tablet (4 mg total) by mouth every 6 (six) hours as needed (nausea). 30 tablet 1    polyethylene glycol (GOLYTELY) 236-22.74-6.74 -5.86 gram suspension SMARTSIG:Milliliter(s) By Mouth      sacubitriL-valsartan (ENTRESTO) 49-51 mg per tablet Take 1 tablet by mouth 2 (two) times daily. 60 tablet 3    simethicone (MYLICON) 80 MG chewable tablet Take 1 tablet (80 mg total) by mouth every 6 (six) hours as needed for Flatulence. 60 tablet 1    spironolactone (ALDACTONE) 25 MG tablet Take 1 tablet (25 mg total) by mouth once daily. 30 tablet 3    [DISCONTINUED] clopidogreL (PLAVIX) 75 mg tablet Take 1 tablet (75 mg total) by mouth once daily. (Patient not taking: No sig  reported) 90 tablet 3    [DISCONTINUED] colchicine (COLCRYS) 0.6 mg tablet Take 1 tablet (0.6 mg total) by mouth once daily. (Patient not taking: Reported on 3/18/2022) 90 tablet 1    [DISCONTINUED] ipratropium (ATROVENT) 0.02 % nebulizer solution Take 2.5 mLs (500 mcg total) by nebulization 4 (four) times daily as needed for Wheezing. Rescue (Patient not taking: Reported on 3/29/2022) 75 mL 0    [DISCONTINUED] metoprolol succinate (TOPROL-XL) 25 MG 24 hr tablet Take 1 tablet (25 mg total) by mouth once daily. 90 tablet 3    [DISCONTINUED] midodrine (PROAMATINE) 2.5 MG Tab Take 1 tablet (2.5 mg total) by mouth 3 (three) times daily. HOLD until follow up with cardiology 90 tablet 0       PMH:  As per HPI and below:  Past Medical History:   Diagnosis Date    Acute hypoxemic respiratory failure 11/7/2015    Acute idiopathic gout of left knee 12/2/2019    Acute idiopathic gout of right elbow 9/23/2021    AICD (automatic cardioverter/defibrillator) present 12/13/2015      Anticoagulant long-term use     Bronchopneumonia 12/20/2021    CHF (congestive heart failure)     Chronic anticoagulation 5/5/2016    Chronic combined systolic and diastolic heart failure 11/26/2012    EF 10-20% on ECHO 2013    Chronic gout 12/2/2019    Clotting disorder     Coronary artery disease involving native coronary artery of native heart without angina pectoris 11/26/2012    Cath 10- Stents patent non-obstructive disease Cath 11-12015 non-obstructive disease     COVID-19 08/2021    Had antibody infusion    Diverticulosis of colon     DVT (deep venous thrombosis), unspecified laterality 11/12/2015    Dysphonia 1/28/2018    Essential hypertension 11/15/2015    Fine motor impairment 2/2/2021    Hyperlipidemia     Hypertensive heart disease with heart failure 5/5/2016    MI (myocardial infarction) 2009    x's 5    Nicotine abuse     Obesity 11/26/2012    Olecranon bursitis of right elbow 9/19/2021    Pulmonary embolism 2011     Renal disorder     LAVERNE    Right carpal tunnel syndrome 4/6/2018    Stented coronary artery 11/26/2012    LAD stent placed 10/17/2007     Trigger thumb of right hand 4/6/2018     Past Surgical History:   Procedure Laterality Date    APPENDECTOMY      BACK SURGERY      CARDIAC DEFIBRILLATOR PLACEMENT      CARDIAC SURGERY  2007    stent    CARPAL TUNNEL RELEASE Right 04/2018    COLONOSCOPY N/A 8/8/2022    Procedure: COLONOSCOPY;  Surgeon: Sascha Keenan MD;  Location: [facility] ENDO (2ND FLR);  Service: Endoscopy;  Laterality: N/A;  EF-15%  pre heart    AICD-[employer] Rodri       COVID test [facility] 8/5-inst mail-am prep-clears 4 hrs prior-tb    INSERTION OF BIVENTRICULAR IMPLANTABLE CARDIOVERTER-DEFIBRILLATOR (ICD) N/A 5/3/2019    Procedure: INSERTION, ICD, BIVENTRICULAR;  Surgeon: Teofilo Pal MD;  Location: [facility] EP LAB;  Service: Cardiology;  Laterality: N/A;  ICH CM,  ICD UPGD BI-V,  SJM, MAC, FAS, 3PREP (dual ICD INSITU)    r knee scope      REVISION OF SKIN POCKET FOR CARDIOVERTER-DEFIBRILLATOR Left 5/3/2019    Procedure: Revision, Skin Pocket, For Cardioverter-Defibrillator;  Surgeon: Teofilo Pal MD;  Location: [facility] EP LAB;  Service: Cardiology;  Laterality: Left;    RIGHT HEART CATHETERIZATION Right 6/14/2021    Procedure: INSERTION, CATHETER, RIGHT HEART;  Surgeon: Lizz Nieto MD;  Location: [facility] CATH LAB;  Service: Cardiology;  Laterality: Right;    RIGHT HEART CATHETERIZATION Right 6/17/2022    Procedure: INSERTION, CATHETER, RIGHT HEART;  Surgeon: Migue Henry Jr., MD;  Location: [facility] CATH LAB;  Service: Cardiology;  Laterality: Right;    SPINE SURGERY      TONSILLECTOMY      TRIGGER FINGER RELEASE Right 04/2018    thumb       Social History     Socioeconomic History    Marital status:     Number of children: 2   Occupational History    Occupation: [employer]     Comment: unemployed   Tobacco Use    Smoking status: Former     Packs/day: 1.00     Years: 45.00     Pack years: 45.00      Types: Cigarettes     Quit date: 2005     Years since quittin.7    Smokeless tobacco: Never    Tobacco comments:     1-1.5 ppd every day.   Substance and Sexual Activity    Alcohol use: No    Drug use: No    Sexual activity: Not Currently       Family History   Problem Relation Age of Onset    Cancer Mother         colon cancer    Heart disease Mother     Diabetes Mother     Heart disease Father     Stroke Father     Colon polyps Father     Cancer Paternal Grandmother         leukemia    No Known Problems Sister     No Known Problems Daughter     No Known Problems Son     No Known Problems Sister     No Known Problems Son        Physical Exam:      Vitals:    22 0547   BP:    Pulse: 110   Resp:    Temp:      Gen:  Chronically ill-appearing.  Mental Status:  Alert and oriented .  Appropriate, conversant.  Skin: Warm, dry. No rashes seen.  Eyes: No conjunctival injection.  Pulm:  Tachypneic, hypoxic.  Bibasilar crackles.  CV: Tachycardic. Regular rhythm.  No lower extremity edema.  Abd: Soft.  Distended.  Diffusely tender to palpation without rebound tenderness or guarding.  MSK: Good range of motion all joints.  No deformities.    Neuro: Awake. Speech normal. No focal neuro deficit observed.    Laboratory Studies:  Labs Reviewed   CBC W/ AUTO DIFFERENTIAL   COMPREHENSIVE METABOLIC PANEL   LIPASE   URINALYSIS, REFLEX TO URINE CULTURE   TROPONIN I   B-TYPE NATRIURETIC PEPTIDE       EKG (independently interpreted by me):  Paced rhythm.  Rate 77.  No STEMI.   milliseconds.   milliseconds.    X-rays (independently interpreted by me):  Pulmonary edema.    Chart reviewed.  See summary in HPI    Imaging Results              X-Ray Chest 1 View (Final result)  Result time 22 05:43:48      Final result by Cisco Arechiga MD (22 05:43:48)                   Impression:      Cardiomegaly with central vascular congestion and probable mild interstitial edema.    Electronically signed by  resident: Agustin Costello  Date:    09/14/2022  Time:    05:30    Electronically signed by: Cisco Arechiga MD  Date:    09/14/2022  Time:    05:43               Narrative:    EXAMINATION:  XR CHEST 1 VIEW    CLINICAL HISTORY:  Hypoxemia    TECHNIQUE:  Single frontal view of the chest was performed.    COMPARISON:  06/22/2022, 06/16/2022    FINDINGS:  Left chest wall AICD with transvenous pacing leads.  Right upper extremity PICC with tip projecting over the distal SVC/cavoatrial junction.    There is enlargement of the cardiomediastinal silhouette.  There is mild atherosclerotic calcification of the thoracic aorta.  There is prominence of the central pulmonary vasculature with increased interstitial attenuation bilaterally.  No definite large confluent airspace consolidation appreciated.  No large volume of pleural fluid or pneumothorax appreciated.  Osseous structures demonstrate mild degenerative changes.                                      Medications Given:  Medications   morphine injection 6 mg (6 mg Intravenous Given 9/14/22 0540)   ondansetron injection 4 mg (4 mg Intravenous Given 9/14/22 0541)       MDM:    72 y.o. male with history of severe systolic heart failure with dobutamine drip, currently being worked up for LVAD placement versus less likely heart transplant, presenting to emergency room with complaint of abdominal pain, vomiting, and shortness of breath.  Here, patient is tachycardic, afebrile, with maps around 65.  He is on his home dobutamine.  Satting 92% on room air.    EKG without STEMI.  Signed out to oncoming physician pending remainder of labs and CT scan for final disposition.    Diagnostic Impression:    1. Vomiting    2. Hypoxia               Patient understands the plan and is in agreement, verbalized understanding, questions answered    Lupe Infante MD  Emergency Medicine         Lupe Infante MD  09/14/22 0553

## 2022-09-14 NOTE — HPI
"73 yo WM being worked up for LVAD since hospitalization January 2022 for recurrent VT (he thinks had MI) and cardiogenic shock at [facility]. He was  later seen in [facility] where he was treated for cardiogenic shock and sent home on .  He remains on  and is pursuing evaluation for LVAD.     He has had a couple of observation admissions where he was told to be dry at visit June 2022. At July 2022 visit he was noted that he could not tolerate higher doses of meds due to symptomatic hypotension.  He still notes occasional dizzy spells when he 1st stands but there are not too bad at this time.  Uses Lasix only as needed targeting his home weight 218-220.      His case was discussed at selection committee and he was deferred pending evaluation of pancreatic mass.  They wish to proceed with MRI but not sure with his ICD if this will be possible. Home weight this AM was 216#.    Interval History:  Today he comes in due to abdominal discomfort. He ate packaged spaghetti last night and has since been with abdominal pain and dry heaving. No vomiting or diarrhea. He intermittently uses home oxygen 4L and felt the need to start using last night. He states he feels like " a bug got him". He reports noticing that his HR was significantly elevated over night about 100-115bpm. He is compliant with his lasix. No lower extremity edema. Felt warm this morning. His abdomen appears distended however he reports it normally gets firm/tight when volume overloaded, which is not the case at this time.     Bedside echo shows EF approx 15-20%. IVC measuring approx 1.4cm and collapsible. No JVD. CXR shows bilateral pulmonary congestion.    Echo 9/14/22  Moderate left atrial enlargement.  The left ventricle is severely enlarged with eccentric hypertrophy and severely decreased systolic function.  There is severe left ventricular global hypokinesis with anterospetal and apical scar. The estimated ejection fraction is 10-15%.  Left ventricular " diastolic dysfunction.  Normal right ventricular size with normal right ventricular systolic function.  Mild mitral regurgitation.  Normal central venous pressure (3 mmHg).  There is no significant tricuspid regurgitation and therefore the pulmonary artery systolic pressure cannot be reported.

## 2022-09-14 NOTE — Clinical Note
Balloon inflated under flouro with Visi. No Visi injected into patient, just used to visualize under flouro. Ballon deflated and Visi removed from balloon. Level of SVC noted at 15cm. Balloon marked, pulled back,and secured.

## 2022-09-14 NOTE — Clinical Note
A rotating dilator sheath was inserted over the RA lead. The rotating dilator sheath was removed from lead.

## 2022-09-14 NOTE — Clinical Note
The laser sheath was inserted over RA lead. The laser sheath was advanced over lead using counter traction methods.

## 2022-09-15 NOTE — NURSING
Pt received from emergency. Placed on Telemetry and situated in the room .Febrile at 101 already on antibiotics /54, sats 97% on 5 L/NC. Will pass on to the oncoming nurse. Dobutamine drip infusing to right PICC at 5mcg/kg/min

## 2022-09-15 NOTE — ED NOTES
[facility] notified that patient's PICC line does not draw back blood for the SvO2.  States will place order for cath aletha.

## 2022-09-15 NOTE — RESPIRATORY THERAPY
RAPID RESPONSE NURSE PROACTIVE ROUNDING NOTE       Time of Visit: 810     Admit Date: 2022  LOS: 1  Code Status: Full Code   Date of Visit: 09/15/2022  : 1950  Age: 72 y.o.  Sex: male  Race: White  Bed: 347/347 A:   MRN: [id number]  Was the patient discharged from an ICU this admission? No   Was the patient discharged from a PACU within last 24 hours? No   Did the patient receive conscious sedation/general anesthesia in last 24 hours? No   Was the patient in the ED within the past 24 hours? Yes   Was the patient on NIPPV within the past 24 hours? Yes   Attending Physician: Migue Henry Jr.,*  Primary Service: [facility] HEART TRANSPLANT TEAM 1   Time spent at the bedside: 15 -30 min    SITUATION    Notified by Rapid Response RT  Reason for alert: Chronic conditions exacerbation     Diagnosis: Chronic combined systolic and diastolic congestive heart failure   has a past medical history of Acute hypoxemic respiratory failure, Acute idiopathic gout of left knee, Acute idiopathic gout of right elbow, AICD (automatic cardioverter/defibrillator) present, Anticoagulant long-term use, Bronchopneumonia, CHF (congestive heart failure), Chronic anticoagulation, Chronic combined systolic and diastolic heart failure, Chronic gout, Clotting disorder, Coronary artery disease involving native coronary artery of native heart without angina pectoris, COVID-19, Diverticulosis of colon, DVT (deep venous thrombosis), unspecified laterality, Dysphonia, Essential hypertension, Fine motor impairment, Hyperlipidemia, Hypertensive heart disease with heart failure, MI (myocardial infarction), Nicotine abuse, Obesity, Olecranon bursitis of right elbow, Pulmonary embolism, Renal disorder, Right carpal tunnel syndrome, Stented coronary artery, and Trigger thumb of right hand.    Last Vitals:  Temp: 101.5 °F (38.6 °C) (09/15 0626)  Pulse: 86 (09/15 0822)  Resp: 22 (09/15 0626)  BP: 105/54 (09/15 0626)  SpO2: 96 % (09/15 0810)    24 Hour  Vitals Range:  Temp:  [99.2 °F (37.3 °C)-103.1 °F (39.5 °C)]   Pulse:  []   Resp:  [16-40]   BP: ()/(47-91)   SpO2:  [90 %-100 %]     Clinical Issues: Respiratory    ASSESSMENT/INTERVENTIONS    Patient denies worsening SOB    RECOMMENDATIONS  Continue with care plan      Discussed plan of care with  [facility] nursing    PROVIDER ESCALATION    Physician escalation: No        Disposition:Remain in room 347    FOLLOW UP           0810

## 2022-09-15 NOTE — PROGRESS NOTES
Interdisciplinary Rounds Report:   Attended interdisciplinary rounds with the [facility]/CTS services including the LVAD Coordinators, social workers, cardiologists, surgeons,  PT/OT/Speech, dietician, and unit charge nurses. Discussed patient status, plan of care, goals of care, including DC date, and post discharge needs. Plan of care will be discussed with the patient and/or family per the physician while rounding on the floor. This is a recurring meeting that is medically and socially necessary to collaborate with the interdisciplinary team to assist patient needs and safe discharge.

## 2022-09-15 NOTE — PROGRESS NOTES
Pharmacokinetic Initial Assessment: IV Vancomycin    Assessment/Plan:    Initiate intravenous vancomycin with loading dose of 2000 mg once with subsequent doses when random concentrations are less than 20 mcg/mL  Desired empiric serum trough concentration is 15 to 20 mcg/mL  Draw vancomycin random level on 9/16 with AM labs.  Pharmacy will continue to follow and monitor vancomycin.      Please contact pharmacy at extension [phone] with any questions regarding this assessment.     Thank you for the consult,   Trinity Ochoa       Patient brief summary:  Audrey Oneil Jr. is a 72 y.o. male initiated on antimicrobial therapy with IV Vancomycin for treatment of suspected bacteremia    Drug Allergies:   Review of patient's allergies indicates:   Allergen Reactions    Iodine containing multivitamin Swelling     itching    Keflex [cephalexin] Swelling     Eyes.  Tolerated multiple doses of zosyn and 1 dose of augmentin in 2015 and 2016, respectively    Peaches [peach (prunus persica)] Swelling     eyes    Shellfish containing products Swelling    Fig tree Swelling     itching    Tuberculin spenser test ppd Rash       Actual Body Weight:   98 kg    Renal Function:   Estimated Creatinine Clearance: 47.9 mL/min (A) (based on SCr of 1.6 mg/dL (H)).     Dialysis Method (if applicable):  N/A    CBC (last 72 hours):  Recent Labs   Lab Result Units 09/12/22 1805 09/14/22  0537   WBC K/uL 4.96 10.14   Hemoglobin g/dL 11.8* 11.9*   Hematocrit % 37.9* 36.5*   Platelets K/uL 206 162   Gran % % 56.6 91.2*   Lymph % % 32.3 2.7*   Mono % % 7.5 5.1   Eosinophil % % 2.6 0.1   Basophil % % 0.8 0.3   Differential Method  Automated Automated       Metabolic Panel (last 72 hours):  Recent Labs   Lab Result Units 09/12/22 1805 09/14/22  0537 09/14/22  0548 09/14/22  1744   Sodium mmol/L 139 139  --  138   Potassium mmol/L 4.3 5.1  --  4.0   Chloride mmol/L 107 105  --  113*   CO2 mmol/L 23 21*  --  18*   Glucose mg/dL 102 161*  --  104    Glucose, UA   --   --  4+*  --    BUN mg/dL 24* 28*  --  30*   Creatinine mg/dL 1.6* 2.0*  --  1.6*   Albumin g/dL 3.8 3.9  --  2.8*   Total Bilirubin mg/dL 0.4 0.9  --  0.6   Alkaline Phosphatase U/L 77 63  --  44*   AST U/L 17 19  --  19   ALT U/L 15 13  --  14   Magnesium mg/dL 2.0  --   --   --        Drug levels (last 3 results):  No results for input(s): VANCOMYCINRA, VANCORANDOM, VANCOMYCINPE, VANCOPEAK, VANCOMYCINTR, VANCOTROUGH in the last 72 hours.    Microbiologic Results:  Microbiology Results (last 7 days)       Procedure Component Value Units Date/Time    MRSA/SA Rapid ID by PCR from Blood culture [[id number]] Collected: 09/15/22 0050    Order Status: No result Updated: 09/15/22 0051    Blood culture [[id number]] Collected: 09/14/22 1259    Order Status: Completed Specimen: Blood from Peripheral, Antecubital, Left Updated: 09/15/22 0050     Blood Culture, Routine Gram stain robert bottle: Gram positive cocci in clusters resembling Staph      Results called to and read back by: Gilbert Castle RN,  09/15/2022  00:50    Blood culture [[id number]] Collected: 09/14/22 1302    Order Status: Completed Specimen: Blood from Wrist, Left Updated: 09/14/22 2115     Blood Culture, Routine No Growth to date    Influenza A & B by Molecular [[id number]] Collected: 09/14/22 0632    Order Status: Completed Specimen: Nasopharyngeal Swab Updated: 09/14/22 0725     Influenza A, Molecular Negative     Influenza B, Molecular Negative     Flu A & B Source Nasal swab

## 2022-09-15 NOTE — PROGRESS NOTES
Admit    Met with patient to assess needs. Patient is a 72 y.o. single male, admitted for AF (atrial fibrillation) [I48.91], Chest pain [R07.9], CHF (congestive heart failure), NYHA class II, acute, systolic [I50.21] and stomach pain per medical record.      Patient admitted from ED on 9/14/2022.  At this time, patient presents as alert and oriented x 4, pleasant, good eye contact, calm, communicative, cooperative and asking and answering questions appropriately.  Caregivers family is present.     Household/Family Systems     Patient resides with patient's sister and brother in law, at    [address]  [place] [address]     Pt shares a cell phone with his S/O, Pam Saucedo: [phone]    Additional Contacts:  Clarissa (sister): [phone] (pt reports this is either his brother in law's # or one of his son's.     Support system includes S/O, sister, brother in law, sons. Patient does not have dependents that are need of being cared for.     Patients primary caregiver is self.    During admission, patient's caregiver plans to stay at home. Confirmed patient and patients caregivers do have access to reliable transportation. Pt's SO, sister and brother in law all drive. Pt is no longer driving.    Cognitive Status/Learning     Patient reports reading ability as college and states patient does have difficulty with seeing and hearing. Pt reports hearing loss and states he wears 225 strength OTC readers. Patient reports patient learns best by multiple methods.   Needed: No.   Highest education level: Attended College/Technical School    Vocation/Disability   .  Working for Income: No  If no, reason not working: Disability  Patient is disabled due to 4 broken ribs and first MI since 2005.      Adherence     Patient reports a high level of adherence to patients health care regimen.  Adherence counseling and education provided. Patient verbalizes understanding.    Substance Use    Patient  reports the following substance usage.    Tobacco: none, patient denies any use.  Alcohol: none, patient denies any use.  Illicit Drugs/Non-prescribed Medications: none, patient denies any use.    Patient states clear understanding of the potential impact of substance use. Substance abstinence/cessation counseling, education and resources provided and reviewed.     Services Utilizing/ADLS    Infusion Service: Prior to admission, patient utilizing? yes, [facility] for .  Home Health: Prior to admission, patient utilizing? no. Pt uses [facility] infusion suite.   DME: Prior to admission, yes, O2, CPAP, BSC, walker and wheelchair (does not often use the latter two items).  Pulmonary/Cardiac Rehab: Prior to admission, no, pt reports he goes to outpt PT on [address] for back and neck issues.  Dialysis:  Prior to admission, no  Trans[facility] Special[facility] Pharmacy:  Prior to admission, no.    Prior to admission, patient reports patient was independent with ADLS and was not driving.  Patient reports patient is able to care for self at this time..  Patient indicates a willingness to care for self once medically cleared to do so.    Insurance/Medications    Insured by   Payer/Plan Subscr  Sex Relation Sub. Ins. ID Effective Group Num   1. PATTI QUICK* PEPE LICONA * 1950 Male Self [id number] [address] [facility] [address]      Primary Insurance (for UNOS reporting): Public Insurance - Medicare FFS (Fee For Service)  Secondary Insurance (for UNOS reporting): None    Patient reports patient is able to obtain and afford medications at this time and at time of discharge.    Living Will/Healthcare Power of     Patient states patient does not have a LW and/or HCPA.  Pt reports he plans to work on this.  provided education regarding LW and HCPA and the completion of forms.    Coping/Mental Health    Patient is coping well with the aid of  family members and friends.  Patient denies mental health difficulties. General support provided.    Discharge Planning    At time of discharge, patient plans to return to patient's home under the care of self.  Patients  harinder or SO's dgtr  will transport patient.  Per rounds today, expected discharge date has not been medically determined at this time. Patient verbalizes understanding and are involved in treatment planning and discharge process.    Additional Concerns    Patient's caretaker denies additional needs and/or concerns at this time. Patient is being followed for needs, education, resources, information, emotional support, supportive counseling, and for supportive and skilled discharge plan of care.  providing ongoing psychosocial support, education, resources and d/c planning as needed.  SW remains available.  provided resource list, patient choice, psychosocial and supportive counseling, resources, education, assistance and discharge planning with patient and caregiver involvement, ongoing SW availability and services as appropriate.  remains available. Patient's caregiver verbalizes understanding and agreement with information reviewed,  availability and how to access available resources as needed. Patient denies additional needs and/or concerns at this time. Patient verbalizes understanding and agreement with information reviewed, social work availability, and how to access available resources as needed.

## 2022-09-15 NOTE — PROGRESS NOTES
[facility]  H&P Note     Hospital Length of Stay: 1  Interval History:  Overnight events: Weaned off bipap.  Bld Cx + for MRSA.  Started on vancomycin.  -Feeling better this AM outside of pain lower R ribs worse with certain movements and palpation.  Mild HA.  SOB improved.  No further nausea.  No presyncope, diarrhea, skin rashes or dysuria.    Vitals:  Temp:  [99.2 °F (37.3 °C)-103.1 °F (39.5 °C)] 100 °F (37.8 °C)  Pulse:  [] 91  Resp:  [16-40] 16  SpO2:  [94 %-100 %] 100 %  BP: ()/(42-91) 79/42    Intake/Output    Intake/Output Summary (Last 24 hours) at 9/15/2022 1609  Last data filed at 9/15/2022 1000  Gross per 24 hour   Intake 500 ml   Output 1000 ml   Net -500 ml       Physical Exam:  Gen: in bed, no acute distress, converant  HEENT: NC, MMM, poor dentition  Cardio: Regular rate, +murmur  Resp: nasal   Abd: Soft, obese, non-tender  Skin: Warm, dry  Ext: Trace to 1+ pitting LE edema. Warm hands and feet.  Dps palpable bilat.  Neuro: Moving all extremities  Psych: Normal affect  : no valentin  MSK: focal TTP lateral lower right ribs.  No spinal/paraspinal TTP.    Labs:  Recent Labs   Lab 09/12/22  1805 09/14/22  0537 09/14/22  1254 09/15/22  0320   WBC 4.96 10.14  --  9.29   HGB 11.8* 11.9*  --  11.5*   HCT 37.9* 36.5* 39 36.1*    162  --  124*       Recent Labs   Lab 09/12/22  1805 09/14/22  0537 09/14/22  1241 09/14/22  1744 09/15/22  0320    139  --  138 139   K 4.3 5.1  --  4.0 4.7    105  --  113* 105   CO2 23 21*  --  18* 22*   BUN 24* 28*  --  30* 40*   CREATININE 1.6* 2.0*  --  1.6* 2.4*    161*  --  104 139*   CALCIUM 9.0 8.6*  --  6.7* 8.7   MG 2.0  --   --   --  1.9   PHOS  --   --   --   --  4.5   LIPASE  --  8 6  --   --          Imaging:  No new.    Current Meds:   acetaminophen  650 mg Oral QID    allopurinoL  200 mg Oral Daily    amiodarone  300 mg Oral Daily    aspirin  81 mg Oral Daily    atorvastatin  80 mg Oral Daily    LIDOcaine   1 patch Transdermal Q24H    mupirocin   Nasal BID       Continuous Infusions:   DOBUTamine IV infusion (non-titrating) 5 mcg/kg/min (09/14/22 1434)       PRN:  HYDROcodone-acetaminophen, methocarbamoL, ondansetron, sodium chloride 0.9%, Pharmacy to dose Vancomycin consult **AND** vancomycin - pharmacy to dose    Assessment and Plan:  72M with hx of HFrEF (EF 15%, S/p CRT-D, on chronic  2.5), CAD (s/p multiple remote PCI, reported non-obstructive LHC 1/2022), Vtach, HTN, HLD, and CKD who presented with 1 day upper abdominal pain and SOB found to have decompensated HF with progressive hypoxic respiratory failure while in the ED initiated on IV lasix and BiPAP.  Resp failure improving  - on NC now.  Found to be bacteremic with MRSA, suspect PICC related.    #A on C HFrEF  - Continue  at 5mcg/kg/min  - Hold on further lasix today  - Hold PTA entresto 49/51mg BID, spironolactone 25mg daily, and jardiance  - Strict I/Os  - Target K 4 and Mg 2  - Tele  - Repeat BMP    #MRSA bacteremia  - ID consult  - Remove PICC  - Vancomycin  - Repeat Bld Cxs    #Abd pain/nausea -->resolved.  CT A/P without acute pathology. ?Viral/toxin gastritis/GERD  - CTM    #R chest wall pain --> suspect muscle spasm/costochondritis.  Doubt infectious etiology given timing and exam.  - Tylenol  - Robaxin  - PTA norco  - Lidocaine patch     #CAD  - PTA ASA  - PTA statin    #Hx Vtach  - PTA amiodarone 300mg daily    #Known pancreatics lesions/cysts  - Unable to obtain recommended MR for further evaluation   - Hepatology consult for further evaluation recommendations and LVAD candidacy      Masoud De La Torre MD  Cardiovascular PGY5  [facility]  Phone [phone]

## 2022-09-15 NOTE — CARE UPDATE
RAPID RESPONSE NURSE ROUND       Rounding completed with charge RN, Claudette for hypotension reports pt asymptomatic and started on ABX for MRSA.. No additional concerns verbalized at this time. Instructed to call [phone] for further concerns or assistance.

## 2022-09-15 NOTE — ED NOTES
Received bedside report from Peggy RN  Pt AAOx4, resting comfortably in bed, NAD, respirations E/UL, updated on POC, wheels locked and in low position, call bell with in reach, Comfort positioning and restroom needs were addressed. Necessary items were placed with in reach and was advised when a reassessment would take place.

## 2022-09-16 PROBLEM — A41.02: Status: ACTIVE | Noted: 2022-01-01

## 2022-09-16 PROBLEM — B95.62 BACTEREMIA DUE TO METHICILLIN RESISTANT STAPHYLOCOCCUS AUREUS: Status: ACTIVE | Noted: 2022-01-01

## 2022-09-16 PROBLEM — R78.81 BACTEREMIA DUE TO METHICILLIN RESISTANT STAPHYLOCOCCUS AUREUS: Status: ACTIVE | Noted: 2022-01-01
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
## 2022-09-16 NOTE — PROGRESS NOTES
Pharmacokinetic Assessment Follow Up: IV Vancomycin    Vancomycin serum concentration assessment(s):  Vancomycin random level resulted with AM labs at 11.2.   Goal trough 15-20 for MRSA bacteremia.   Renal function improving. SCr down from 2 to 1.7mg/dL.   Plan to schedule vancomycin 1250mg IV Q 24 hours.   Trough ordered 9.19 at 1330.     Alexandria Barnes PharmD, [facility]  Heart Transplant Clinical Specialist   Spectralink: [id number]    Drug levels (last 3 results):  Recent Labs   Lab Result Units 09/16/22  0443   Vancomycin, Random ug/mL 11.2        Patient brief summary:  Audrey Oneil Jr. is a 72 y.o. male initiated on antimicrobial therapy with IV Vancomycin for treatment of bacteremia    Drug Allergies:   Review of patient's allergies indicates:   Allergen Reactions    Iodine containing multivitamin Swelling     itching    Keflex [cephalexin] Swelling     Eyes.  Tolerated multiple doses of zosyn and 1 dose of augmentin in 2015 and 2016, respectively    Peaches [peach (prunus persica)] Swelling     eyes    Shellfish containing products Swelling    Fig tree Swelling     itching    Tuberculin spenser test ppd Rash       Actual Body Weight:   98kg    Renal Function:   Estimated Creatinine Clearance: 45.1 mL/min (A) (based on SCr of 1.7 mg/dL (H)).,     Dialysis Method (if applicable):  N/A    CBC (last 72 hours):  Recent Labs   Lab Result Units 09/14/22  0537 09/15/22  0320 09/16/22  0443   WBC K/uL 10.14 9.29 5.87   Hemoglobin g/dL 11.9* 11.5* 10.9*   Hematocrit % 36.5* 36.1* 33.8*   Platelets K/uL 162 124* 114*   Gran % % 91.2* 88.3* 85.2*   Lymph % % 2.7* 7.6* 8.7*   Mono % % 5.1 3.6* 5.3   Eosinophil % % 0.1 0.0 0.3   Basophil % % 0.3 0.2 0.2   Differential Method  Automated Automated Automated       Metabolic Panel (last 72 hours):  Recent Labs   Lab Result Units 09/14/22  0537 09/14/22  0548 09/14/22  1744 09/15/22  0320 09/15/22  1502 09/16/22  0443   Sodium mmol/L 139  --  138 139 132* 136   Potassium mmol/L 5.1  --   4.0 4.7 4.1 4.1   Chloride mmol/L 105  --  113* 105 104 103   CO2 mmol/L 21*  --  18* 22* 25 24   Glucose mg/dL 161*  --  104 139* 123* 111*   Glucose, UA   --  4+*  --   --   --   --    BUN mg/dL 28*  --  30* 40* 40* 42*   Creatinine mg/dL 2.0*  --  1.6* 2.4* 2.2* 1.7*   Albumin g/dL 3.9  --  2.8* 3.4*  --  3.1*   Total Bilirubin mg/dL 0.9  --  0.6 0.8  --  0.8   Alkaline Phosphatase U/L 63  --  44* 51*  --  49*   AST U/L 19  --  19 30  --  27   ALT U/L 13  --  14 23  --  25   Magnesium mg/dL  --   --   --  1.9 2.1 2.2   Phosphorus mg/dL  --   --   --  4.5  --   --        Vancomycin Administrations:  vancomycin given in the last 96 hours                     vancomycin 2 g in dextrose 5 % 500 mL IVPB (mg) 2,000 mg New Bag 09/15/22 0147                    Microbiologic Results:  Microbiology Results (last 7 days)       Procedure Component Value Units Date/Time    Blood culture [[id number]]  (Abnormal) Collected: 09/14/22 1259    Order Status: Completed Specimen: Blood from Peripheral, Antecubital, Left Updated: 09/16/22 0902     Blood Culture, Routine Gram stain robert bottle: Gram positive cocci in clusters resembling Staph      Results called to and read back by: Gilbert Castle RN,  09/15/2022  00:50      Gram stain aer bottle: Gram positive cocci in clusters resembling Staph      Positive results previously called 09/15/2022  03:06      STAPHYLOCOCCUS AUREUS  ID consult required at [facility] and Nancy orellana.  For susceptibility see order #[id number]      Blood culture [[id number]]  (Abnormal) Collected: 09/14/22 1302    Order Status: Completed Specimen: Blood from Wrist, Left Updated: 09/16/22 0854     Blood Culture, Routine Gram stain robert bottle: Gram positive cocci      Positive results previously called 09/15/2022  02:59      STAPHYLOCOCCUS AUREUS  Susceptibility pending  ID consult required at [facility] and Nancy locations.      Blood culture [[id number]] Collected: 09/15/22 1142    Order  Status: Completed Specimen: Blood Updated: 09/16/22 0541     Blood Culture, Routine Gram stain aer bottle: Gram positive cocci in clusters resembling Staph      Positive results previously called 09/16/2022  05:41    Narrative:      Rt AC    Blood culture [[id number]] Collected: 09/16/22 0443    Order Status: Sent Specimen: Blood Updated: 09/16/22 0533    Narrative:      Rt hand    Blood culture [[id number]] Collected: 09/16/22 0443    Order Status: Sent Specimen: Blood Updated: 09/16/22 0533    Narrative:      Lft wrist    Blood culture [[id number]] Collected: 09/15/22 1142    Order Status: Completed Specimen: Blood Updated: 09/16/22 0503     Blood Culture, Routine Gram stain aer bottle: Gram positive cocci in clusters resembling Staph      Results called to and read back by:Estrellita Eric RN  09/16/2022  05:03    Narrative:      Rt wrist    Blood culture [[id number]]     Order Status: Canceled Specimen: Blood     Blood culture [[id number]]     Order Status: Canceled Specimen: Blood     MRSA/SA Rapid ID by PCR from Blood culture [[id number]]  (Abnormal) Collected: 09/15/22 0050    Order Status: Completed Updated: 09/15/22 0156     Staph aureus ID by PCR Positive     MRSA ID by PCR Positive    Influenza A & B by Molecular [[id number]] Collected: 09/14/22 0632    Order Status: Completed Specimen: Nasopharyngeal Swab Updated: 09/14/22 0725     Influenza A, Molecular Negative     Influenza B, Molecular Negative     Flu A & B Source Nasal swab
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
## 2022-09-16 NOTE — CONSULTS
[facility]  AES  Consult Note    Patient Name: Audrey Oneil Jr.  MRN: [id number]  Admission Date: 9/14/2022  Hospital Length of Stay: 2 days  Code Status: Full Code   Attending Provider: Migue Henry Jr.,*   Consulting Provider: Curry Avila MD  Primary Care Physician: Primary Doctor No  Principal Problem:Chronic combined systolic and diastolic congestive heart failure    Inpatient consult to [facility] (AES)  Consult performed by: Curry Avila MD  Consult ordered by: Masoud De La Torre MD      Subjective:     HPI: Audrey Oneil Jr. is a 72 y.o. male with history of HFrEF (EF 15%, s/p CRT-D, on home ionotropes), CAD s/p PCI, history of VT, HLD, CKD who presented with abdominal pain, found to be in decompensated HF. Has been having RUQ and back pain over past week which prmopted presentation.Now also with MRSA bacteremia, suspected due to indwelling PICC. AES consulted for known pancreatic lesions/cysts in setting of LVAD evaluation. First noted on CTA AP in 03/2022. 2.9 cm multi-cystic, lobulated lesion in pancreatic head/uncinate process along with 1.1 cm cyst in tail, possible side branch IPMN. No PD dilation. Again noted in 04/2022 on CT AP w contrast, multiple hypodensities measuring 1.3 cm, 2.0 cm, 1.9 cm, likely side branch IPMNs vs simple cysts. Most recently had CT AP wo contrast, stable in size. Cannot obtain MRI due to CRT-D. He denies unintentional weight loss. Has 45 pack eyar smoking history.        Past Medical History:   Diagnosis Date    Acute hypoxemic respiratory failure 11/7/2015    Acute idiopathic gout of left knee 12/2/2019    Acute idiopathic gout of right elbow 9/23/2021    AICD (automatic cardioverter/defibrillator) present 12/13/2015      Anticoagulant long-term use     Bronchopneumonia 12/20/2021    CHF (congestive heart failure)     Chronic anticoagulation 5/5/2016    Chronic combined systolic and diastolic heart failure  11/26/2012    EF 10-20% on ECHO 2013    Chronic gout 12/2/2019    Clotting disorder     Coronary artery disease involving native coronary artery of native heart without angina pectoris 11/26/2012    Cath 10- Stents patent non-obstructive disease Cath 11-12015 non-obstructive disease     COVID-19 08/2021    Had antibody infusion    Diverticulosis of colon     DVT (deep venous thrombosis), unspecified laterality 11/12/2015    Dysphonia 1/28/2018    Essential hypertension 11/15/2015    Fine motor impairment 2/2/2021    Hyperlipidemia     Hypertensive heart disease with heart failure 5/5/2016    MI (myocardial infarction) 2009    x's 5    Nicotine abuse     Obesity 11/26/2012    Olecranon bursitis of right elbow 9/19/2021    Pulmonary embolism 2011    Renal disorder     LAVERNE    Right carpal tunnel syndrome 4/6/2018    Stented coronary artery 11/26/2012    LAD stent placed 10/17/2007     Trigger thumb of right hand 4/6/2018       Past Surgical History:   Procedure Laterality Date    APPENDECTOMY      BACK SURGERY      CARDIAC DEFIBRILLATOR PLACEMENT      CARDIAC SURGERY  2007    stent    CARPAL TUNNEL RELEASE Right 04/2018    COLONOSCOPY N/A 8/8/2022    Procedure: COLONOSCOPY;  Surgeon: Sascha Keenan MD;  Location: [facility] ENDO ([address] [place] [address]);  Service: Endoscopy;  Laterality: N/A;  EF-15%  pre heart    AICD-[facility]       COVID test [facility] 8/5-inst mail-am prep-clears 4 hrs prior-tb    INSERTION OF BIVENTRICULAR IMPLANTABLE CARDIOVERTER-DEFIBRILLATOR (ICD) N/A 5/3/2019    Procedure: INSERTION, ICD, BIVENTRICULAR;  Surgeon: Teofilo Pal MD;  Location: [facility] EP LAB;  Service: Cardiology;  Laterality: N/A;  ICH CM,  ICD UPGD BI-V,  SJM, MAC, FAS, 3PREP (dual ICD INSITU)    r knee scope      REVISION OF SKIN POCKET FOR CARDIOVERTER-DEFIBRILLATOR Left 5/3/2019    Procedure: Revision, Skin Pocket, For Cardioverter-Defibrillator;  Surgeon: Teofilo Pal MD;  Location: [facility] EP LAB;  Service: Cardiology;   Laterality: Left;    RIGHT HEART CATHETERIZATION Right 2021    Procedure: INSERTION, CATHETER, RIGHT HEART;  Surgeon: Lizz Nieto MD;  Location: [facility] CATH LAB;  Service: Cardiology;  Laterality: Right;    RIGHT HEART CATHETERIZATION Right 2022    Procedure: INSERTION, CATHETER, RIGHT HEART;  Surgeon: Migue Henry Jr., MD;  Location: [facility] CATH LAB;  Service: Cardiology;  Laterality: Right;    SPINE SURGERY      TONSILLECTOMY      TRIGGER FINGER RELEASE Right 2018    thumb       Family History   Problem Relation Age of Onset    Cancer Mother         colon cancer    Heart disease Mother     Diabetes Mother     Heart disease Father     Stroke Father     Colon polyps Father     Cancer Paternal Grandmother         leukemia    No Known Problems Sister     No Known Problems Daughter     No Known Problems Son     No Known Problems Sister     No Known Problems Son        Social History     Socioeconomic History    Marital status:     Number of children: 2   Occupational History    Occupation: [employer]     Comment: unemployed   Tobacco Use    Smoking status: Former     Packs/day: 1.00     Years: 45.00     Pack years: 45.00     Types: Cigarettes     Quit date: 2005     Years since quittin.7    Smokeless tobacco: Never    Tobacco comments:     1-1.5 ppd every day.   Substance and Sexual Activity    Alcohol use: No    Drug use: No    Sexual activity: Not Currently       No current facility-administered medications on file prior to encounter.     Current Outpatient Medications on File Prior to Encounter   Medication Sig Dispense Refill    allopurinoL (ZYLOPRIM) 100 MG tablet Take 2 tablets (200 mg total) by mouth once daily. 60 tablet 0    amiodarone (PACERONE) 200 MG Tab TAKE 1 AND 1/2 TABLETS(300 MG) BY MOUTH EVERY  tablet 3    aspirin (ECOTRIN) 81 MG EC tablet Take 1 tablet (81 mg total) by mouth once daily. 90 tablet 3    atorvastatin (LIPITOR) 80 MG tablet Take 1 tablet (80  mg total) by mouth once daily. 90 tablet 1    DOBUTamine (DOBUTREX) 500 mg/250 mL (2,000 mcg/mL) 2.5 mcg/kg/min  Patient is 95.7 kg 250 mL 5    furosemide (LASIX) 40 MG tablet Take 1 tablet (40 mg total) by mouth daily as needed (Weight gain of 3 lbs in one day or 5 lbs in one week). 30 tablet 11    HYDROcodone-acetaminophen (NORCO)  mg per tablet Take 1 tablet by mouth every 6 (six) hours.      JARDIANCE 25 mg tablet Take 1 tablet (25 mg total) by mouth once daily. 30 tablet 5    ondansetron (ZOFRAN-ODT) 4 MG TbDL Dissolve 1 tablet (4 mg total) by mouth every 6 (six) hours as needed (nausea). 30 tablet 1    polyethylene glycol (GOLYTELY) 236-22.74-6.74 -5.86 gram suspension SMARTSIG:Milliliter(s) By Mouth      sacubitriL-valsartan (ENTRESTO) 49-51 mg per tablet Take 1 tablet by mouth 2 (two) times daily. 60 tablet 3    simethicone (MYLICON) 80 MG chewable tablet Take 1 tablet (80 mg total) by mouth every 6 (six) hours as needed for Flatulence. 60 tablet 1    spironolactone (ALDACTONE) 25 MG tablet Take 1 tablet (25 mg total) by mouth once daily. 30 tablet 3    [DISCONTINUED] clopidogreL (PLAVIX) 75 mg tablet Take 1 tablet (75 mg total) by mouth once daily. (Patient not taking: No sig reported) 90 tablet 3    [DISCONTINUED] colchicine (COLCRYS) 0.6 mg tablet Take 1 tablet (0.6 mg total) by mouth once daily. (Patient not taking: Reported on 3/18/2022) 90 tablet 1    [DISCONTINUED] ipratropium (ATROVENT) 0.02 % nebulizer solution Take 2.5 mLs (500 mcg total) by nebulization 4 (four) times daily as needed for Wheezing. Rescue (Patient not taking: Reported on 3/29/2022) 75 mL 0    [DISCONTINUED] metoprolol succinate (TOPROL-XL) 25 MG 24 hr tablet Take 1 tablet (25 mg total) by mouth once daily. 90 tablet 3    [DISCONTINUED] midodrine (PROAMATINE) 2.5 MG Tab Take 1 tablet (2.5 mg total) by mouth 3 (three) times daily. HOLD until follow up with cardiology 90 tablet 0       Review of patient's allergies indicates:    Allergen Reactions    Iodine containing multivitamin Swelling     itching    Keflex [cephalexin] Swelling     Eyes.  Tolerated multiple doses of zosyn and 1 dose of augmentin in 2015 and 2016, respectively    Peaches [peach (prunus persica)] Swelling     eyes    Shellfish containing products Swelling    Fig tree Swelling     itching    Tuberculin spenser test ppd Rash       Review of Systems   Constitutional:  Positive for chills. Negative for fever.   HENT:  Negative for congestion and sore throat.    Eyes:  Negative for blurred vision and double vision.   Respiratory:  Negative for cough and shortness of breath.    Cardiovascular:  Negative for chest pain and palpitations.   Gastrointestinal:  Positive for abdominal pain. Negative for nausea and vomiting.   Genitourinary:  Negative for frequency and urgency.   Musculoskeletal:  Negative for joint pain and myalgias.   Skin:  Negative for itching and rash.   Neurological:  Negative for sensory change and focal weakness.      Objective:     Vitals:    09/16/22 0326   BP: (!) 100/55   Pulse: 105   Resp: 19   Temp: 99.3 °F (37.4 °C)         Constitutional:  not in acute distress and well developed  HENT: Head: Normal, normocephalic, atraumatic.  Eyes: conjunctiva clear and sclera nonicteric  Cardiovascular: regular rate and rhythm and no murmur  Respiratory: normal chest expansion & respiratory effort   and no accessory muscle use  GI: soft, tenderness moderate in the epigastrium and in the RUQ, without guarding, and without rebound  Musculoskeletal: no muscular tenderness noted  Skin: normal color  Neurological: alert, oriented x3  Psychiatric: mood and affect are within normal limits, pt is a good historian; no memory problems were noted      Significant Labs:  Recent Labs   Lab 09/14/22  0537 09/15/22  0320 09/16/22  0443   HGB 11.9* 11.5* 10.9*       Lab Results   Component Value Date    WBC 5.87 09/16/2022    HGB 10.9 (L) 09/16/2022    HCT 33.8 (L) 09/16/2022     MCV 87 09/16/2022     (L) 09/16/2022       Lab Results   Component Value Date     09/16/2022    K 4.1 09/16/2022     09/16/2022    CO2 24 09/16/2022    BUN 42 (H) 09/16/2022    CREATININE 1.7 (H) 09/16/2022    CALCIUM 8.7 09/16/2022    ANIONGAP 9 09/16/2022    ESTGFRAFRICA 38.0 (A) 07/22/2022    EGFRNONAA 32.8 (A) 07/22/2022       Lab Results   Component Value Date    ALT 25 09/16/2022    AST 27 09/16/2022    ALKPHOS 49 (L) 09/16/2022    BILITOT 0.8 09/16/2022       Lab Results   Component Value Date    INR 1.0 06/17/2022    INR 1.0 05/29/2022    INR 1.0 05/28/2022       Significant Imaging:  Reviewed pertinent radiology findings.       Assessment/Plan:     Audrey Oneil Jr. is a 72 y.o. male with history of HFrEF (EF 15%, s/p CRT-D, on home ionotropes), CAD s/p PCI, history of VT, HLD, CKD who presents for abdominal pain, found to be in decompensated CHF as well as MRSA bacteremia. AES consulted for pancreatic lesions first seen on imaging in 03/2022 in setting of LVAD evaluation. Side duct IPMNs vs simple cysts. No MRI or CT pancreatic protocol to date. CRT-D precludes MRI, would benefit from CT pancreatic protocol if tolerable from a renal standpoint.     Problem List:  Pancreatic cystic lesions  HFrEF (EF 15%) on home ionotropes  MRSA bacteremia    Recommendations:  - Needs repeat contrasted study, ideally MRI but precluded by CRT-D  - Would recommend CT pancreatic protocol with contrast  - Discussed EUS but would be high risk with anesthesia. Would prefer imaging at this time.    Thank you for involving us in the care of Audrey Oneil Jr.. Please call with any additional questions, concerns or changes in the patient's clinical status. We will continue to follow.     Curry Avila MD  Gastroenterology Fellow PGY IV  [facility] personally reviewed the imaging, labs, and records from the patient's inpatient course and agree with the assessment and plan.     As  above, MRI/MRCP would be ideal to evaluate pancreas cystic lesions in his case, though this is limited by CRT-D. Next, a contrast enhanced CT with pancreas protocol would be a less invasive modality to characterize these cysts specifically looking for any associated parenchymal changes/neoplasia or worrisome features associated with the cysts such as size, thickened walls, mural nodules, ductal dilation, associated mass, obstructive jaundice, etc. Upon our review of the prior images, there has not appeared to be a worrisome feature associated with these cysts thus far. If a CT pancreas protocol is unable to be completed for some reason, then perhaps an EUS can be the next imaging modality to characterize his pancreatic cysts, though this would come with added risk of sedation with anesthesia and procedural risk. Further a surveillance program can be followed with interval imaging modalities, but this would also need to factor in the patient's surgical candidacy should a worrisome feature be encountered in the future.

## 2022-09-16 NOTE — ASSESSMENT & PLAN NOTE
Bacteremia MRSA: definitive source of infection unclear; possibly left wrist eschar vs. S/p PICC-line in patient  -- MRSA PCR positive; awaiting blood cxr susceptibilities  -- Recommend continuing IV-Vanc; with pharm to dose, trough goal 15-20.  --Due to presence of ICD, recommend HARRY to help further assess for signs of device infection or valvular endocarditis.  -- Will continue to follow to determine further recs

## 2022-09-16 NOTE — HPI
72 year old male with history of PMH of CAD s/p MI, chronic combined systolic and diastolic heart failure on home dobutamine (EF15%), s/p CRT-D HTN, CKD, under consideration for DT-LVAD, recurrent V tach and cardiogenic shock (1/2022), admitted at [facility] [place] for advanced options workup. He was admitted with SOB, fever up to 101.2 F, tachycardia, found to have MRSA on blodo cultures 9/14, 9/15, 9/16 and finally cleared on 9/18.   TTE done 9/14 with no vegetations or abscesses, 10-15% EF; Source of infection was unclear but thought to be from CVC catheter vs eschar lesions on skin.     CT from 9/14 showed multiple stable indeterminate pancreatic hypoattenuating lesions. Sigmoid colonic diverticulosis without diverticulitis or other bowel inflammatory process. Cholelithiasis. Cardiomegaly and trace pericardial fluid. ID was resconsulted on 9/30 for sepsis, patient went ot ICU for worsening hypoxia, intubated, was on pip/tazo and daptomycin. CXR with bilateral infiltrates, concerns for aspiration. Patient back to nasal canula O2 2 days later on 10/1. ID recs were for OPAT plan with Daptomycin until 11/9/22.     PICC line removed 9/22; Cardiac device removal 9/27: laser lead extraction with complete system removal. He had a runneled IR port catheter placed with IR on 10/14    Micro:   Deep pocket, shallow pocket, RA, RV, LV lead tip cultures all show no growth (collected 9/27/22)  Bcx 9/30 NGTD  Resp cx 10/1 normal jadiel, no S aureus or pseudomonas seen  RIP 10/16 negative  Bcx 10/16 NGTD    CXR on 10/13 with patchy increased interstitial and parenchymal attenuation bilaterally CXR 10/17 with Ill-defined patchy airspace consolidation bilaterally with slight improvement     He remains afebrile, with leukocytosis that started up trending on 10/15, and has been stable at 15. Has been on daptomycin (EOT 11/9/22), previously on vancomycin until 9/30; daptomycin started 10/1; pip/tazo (9/30- 10/4) and then added since 10/16,  azithromycin also added 10/16 x 3 days.He denies worsening SOB, but has some congestive cough, no sputum production noted, denies diarrhea (had some yesterday that resolved), dysuria, chest pain, fevers, rigors, abdominal pain.       ID consulted for Being covered for MRSA bacteremia with dapto. Now uptrending [facility]
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
## 2022-09-16 NOTE — PROGRESS NOTES
[facility]  [facility]  H&P Note     Hospital Length of Stay: 2  Interval History:  -No acute events  -Feeling generally unwell but bit better than yesterday.  Achy head, neck, and flank pain.  No presyncope, N/V/D, or SOB.    Vitals:  Temp:  [98.3 °F (36.8 °C)-100 °F (37.8 °C)] 99 °F (37.2 °C)  Pulse:  [] 84  Resp:  [16-20] 16  SpO2:  [98 %-100 %] 98 %  BP: ()/(42-65) 111/49    Intake/Output    Intake/Output Summary (Last 24 hours) at 9/16/2022 1229  Last data filed at 9/16/2022 0400  Gross per 24 hour   Intake --   Output 450 ml   Net -450 ml         Physical Exam:  Gen: in bed, no acute distress, converant  HEENT: NC, MMM, poor dentition  Cardio: Regular rate, +murmur.  JVP ~7 cm h20  Resp: nasal   Abd: Soft, obese, non-tender  Skin: Warm, dry  Ext: Trace LE edema. Warm hands and feet.  Dps palpable bilat.  Neuro: Moving all extremities  Psych: Normal affect  : no valentin  MSK: No spinal/paraspinal TTP.    Labs:  Recent Labs   Lab 09/14/22  0537 09/14/22  1254 09/15/22  0320 09/16/22  0443   WBC 10.14  --  9.29 5.87   HGB 11.9*  --  11.5* 10.9*   HCT 36.5* 39 36.1* 33.8*     --  124* 114*       Recent Labs   Lab 09/14/22  0537 09/14/22  1241 09/14/22  1744 09/15/22  0320 09/15/22  1502 09/16/22  0443     --    < > 139 132* 136   K 5.1  --    < > 4.7 4.1 4.1     --    < > 105 104 103   CO2 21*  --    < > 22* 25 24   BUN 28*  --    < > 40* 40* 42*   CREATININE 2.0*  --    < > 2.4* 2.2* 1.7*   *  --    < > 139* 123* 111*   CALCIUM 8.6*  --    < > 8.7 8.2* 8.7   MG  --   --   --  1.9 2.1 2.2   PHOS  --   --   --  4.5  --   --    LIPASE 8 6  --   --   --   --     < > = values in this interval not displayed.         Imaging:  No new.    Current Meds:   acetaminophen  650 mg Oral QID    allopurinoL  200 mg Oral Daily    amiodarone  300 mg Oral Daily    aspirin  81 mg Oral Daily    atorvastatin  80 mg Oral Daily    LIDOcaine  1 patch Transdermal Q24H    mupirocin   Nasal  BID       Continuous Infusions:   DOBUTamine IV infusion (non-titrating) 5 mcg/kg/min (09/15/22 2107)       PRN:  HYDROcodone-acetaminophen, methocarbamoL, ondansetron, sodium chloride 0.9%, Pharmacy to dose Vancomycin consult **AND** vancomycin - pharmacy to dose    Assessment and Plan:  72M with hx of HFrEF (EF 15%, S/p CRT-D, on chronic  2.5), CAD (s/p multiple remote PCI, reported non-obstructive C 1/2022), Vtach, HTN, HLD, and CKD who presented with 1 day upper abdominal pain and SOB found to have decompensated HF with progressive hypoxic respiratory failure while in the ED initiated on IV lasix and BiPAP.  Resp failure improving  - on NC now.  Found to be bacteremic with MRSA, suspect PICC related.  Bld Cx from 9/15 with NGTD.    #A on C HFrEF  -  to 2.5mcg/kg/min  - Hold on lasix today, target net even  - Entresto 24/26mg BID starting this PM  - Hold PTA entresto 49/51mg BID, spironolactone 25mg daily, and jardiance  - Strict I/Os  - Target K 4 and Mg 2  - Tele    #MRSA bacteremia  - ID consult  - PICC removed  - Vancomycin for now  - Repeat Bld Cxs today    #Abd pain/nausea -->resolved.  CT A/P without acute pathology. ?Viral/toxin gastritis/GERD  - CTM    #R chest wall pain --> suspect muscle spasm/costochondritis.  Doubt infectious etiology given timing and exam.  - Tylenol  - Robaxin  - PTA norco  - Lidocaine patch     #CAD  - PTA ASA  - PTA statin    #Hx Vtach  - PTA amiodarone 300mg daily    #Known pancreatics lesions/cysts  - Unable to obtain recommended MR for further evaluation given CRT-D  - ASE consulted.  Will plan for CT w/ CON pancreatic protocol down the line pending stable renal function.      Masoud De La Torre MD  Cardiovascular PGY5  [facility]  Phone [phone]
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
## 2022-09-17 PROBLEM — K86.9 PANCREATIC LESION: Status: ACTIVE | Noted: 2022-01-01

## 2022-09-17 NOTE — ASSESSMENT & PLAN NOTE
- Monitor WBC count and fever curve, pan-culture if patient spikes  - ID consult and recommendations are appreciated  - Vancomycin 1,250, daily; Monitor level and renal panel  - Repeat blood cultures positive; will repeat as PICC line was recently removed  - Will avoid HARRY at this time, if patient has persistent bacteremia will re-consider

## 2022-09-17 NOTE — PROGRESS NOTES
[facility] - Cardiology Stepdown  Heart Transplant  Progress Note    Patient Name: Audrey Oneil Jr.  MRN: [id number]  Admission Date: 9/14/2022  Hospital Length of Stay: 3 days  Attending Physician: Migue Henry Jr.,*  Primary Care Provider: Primary Doctor No  Principal Problem:Chronic combined systolic and diastolic congestive heart failure    Subjective:     Interval History:   No overnight acute events were reported. Patient remains in contact precautions for MRSA septecemia. Repeat cultures 9/16 appear to also be positive. ID recommendations appreciated. Patient today states he is feeling the same, experiencing some hip pain bilaterally.     Continuous Infusions:   DOBUTamine IV infusion (non-titrating) 2.5 mcg/kg/min (09/16/22 1237)     Scheduled Meds:   acetaminophen  650 mg Oral QID    allopurinoL  200 mg Oral Daily    amiodarone  300 mg Oral Daily    aspirin  81 mg Oral Daily    atorvastatin  80 mg Oral Daily    enoxaparin  40 mg Subcutaneous Daily    LIDOcaine  1 patch Transdermal Q24H    mupirocin   Nasal BID    sacubitriL-valsartan  1 tablet Oral BID    vancomycin (VANCOCIN) IVPB  1,250 mg Intravenous Q24H     PRN Meds:HYDROcodone-acetaminophen, methocarbamoL, ondansetron, sodium chloride 0.9%, Pharmacy to dose Vancomycin consult **AND** vancomycin - pharmacy to dose    Review of patient's allergies indicates:   Allergen Reactions    Iodine containing multivitamin Swelling     itching    Keflex [cephalexin] Swelling     Eyes.  Tolerated multiple doses of zosyn and 1 dose of augmentin in 2015 and 2016, respectively    Peaches [peach (prunus persica)] Swelling     eyes    Shellfish containing products Swelling    Fig tree Swelling     itching    Tuberculin spenser test ppd Rash     Objective:     Vital Signs (Most Recent):  Temp: 98.2 °F (36.8 °C) (09/16/22 2348)  Pulse: 80 (09/17/22 0300)  Resp: 16 (09/16/22 2348)  BP: (!) 106/57 (09/16/22 2348)  SpO2: 98 % (09/16/22 2348)   Vital Signs (24h  Range):  Temp:  [97.6 °F (36.4 °C)-99.5 °F (37.5 °C)] 98.2 °F (36.8 °C)  Pulse:  [80-97] 80  Resp:  [16-20] 16  SpO2:  [94 %-98 %] 98 %  BP: (101-111)/(49-58) 106/57     Patient Vitals for the past 72 hrs (Last 3 readings):   Weight   09/15/22 0840 98 kg (216 lb)   09/14/22 0936 98 kg (216 lb)     Body mass index is 33.83 kg/m².      Intake/Output Summary (Last 24 hours) at 9/17/2022 0613  Last data filed at 9/16/2022 1800  Gross per 24 hour   Intake --   Output 250 ml   Net -250 ml       Physical Exam  Constitutional:       Appearance: Normal appearance. He is obese.   HENT:      Head: Atraumatic.   Eyes:      Conjunctiva/sclera: Conjunctivae normal.   Cardiovascular:      Rate and Rhythm: Normal rate and regular rhythm.   Pulmonary:      Effort: Pulmonary effort is normal.      Breath sounds: No wheezing or rhonchi.   Abdominal:      General: There is distension.      Palpations: Abdomen is soft. There is no mass.      Tenderness: There is no abdominal tenderness.   Musculoskeletal:      Right lower leg: No edema.      Left lower leg: No edema.   Skin:     General: Skin is warm.   Neurological:      Mental Status: He is alert.       Significant Labs:  CBC:  Recent Labs   Lab 09/15/22  0320 09/16/22  0443 09/17/22  0346   WBC 9.29 5.87 4.05   RBC 4.09* 3.90* 3.82*   HGB 11.5* 10.9* 10.5*   HCT 36.1* 33.8* 32.0*   * 114* 120*   MCV 88 87 84   MCH 28.1 27.9 27.5   MCHC 31.9* 32.2 32.8     BNP:  Recent Labs   Lab 09/12/22  1805 09/14/22  0537   * 335*     CMP:  Recent Labs   Lab 09/15/22  0320 09/15/22  1502 09/16/22  0443 09/17/22  0346   * 123* 111* 98   CALCIUM 8.7 8.2* 8.7 8.6*   ALBUMIN 3.4*  --  3.1* 2.8*   PROT 6.4  --  6.3 6.1    132* 136 135*   K 4.7 4.1 4.1 4.0   CO2 22* 25 24 23    104 103 101   BUN 40* 40* 42* 46*   CREATININE 2.4* 2.2* 1.7* 1.7*   ALKPHOS 51*  --  49* 53*   ALT 23  --  25 20   AST 30  --  27 22   BILITOT 0.8  --  0.8 0.7      Coagulation:   No results for  input(s): PT, INR, APTT in the last 168 hours.  LDH:  Recent Labs   Lab 09/14/22  1241   *     Microbiology:  Microbiology Results (last 7 days)       Procedure Component Value Units Date/Time    Blood culture [[id number]] Collected: 09/16/22 0443    Order Status: Completed Specimen: Blood Updated: 09/17/22 0535     Blood Culture, Routine Gram stain aer bottle: Gram positive cocci in clusters resembling Staph      Positive results previously called 09/17/2022  05:34    Narrative:      Lft wrist    Blood culture [[id number]] Collected: 09/16/22 0443    Order Status: Completed Specimen: Blood Updated: 09/16/22 2256     Blood Culture, Routine Gram stain aer bottle: Gram positive cocci in clusters resembling Staph      Results called to and read back by:Estrellita Eric RN 09/16/2022  22:55    Narrative:      Rt hand    Blood culture [[id number]]  (Abnormal) Collected: 09/14/22 1259    Order Status: Completed Specimen: Blood from Peripheral, Antecubital, Left Updated: 09/16/22 0902     Blood Culture, Routine Gram stain robert bottle: Gram positive cocci in clusters resembling Staph      Results called to and read back by: Gilbert Castle RN,  09/15/2022  00:50      Gram stain aer bottle: Gram positive cocci in clusters resembling Staph      Positive results previously called 09/15/2022  03:06      STAPHYLOCOCCUS AUREUS  ID consult required at [facility].  For susceptibility see order #[id number]      Blood culture [[id number]]  (Abnormal) Collected: 09/14/22 1302    Order Status: Completed Specimen: Blood from Wrist, Left Updated: 09/16/22 0854     Blood Culture, Routine Gram stain robert bottle: Gram positive cocci      Positive results previously called 09/15/2022  02:59      STAPHYLOCOCCUS AUREUS  Susceptibility pending  ID consult required at [facility].      Blood culture [[id number]] Collected: 09/15/22 1142    Order Status: Completed Specimen: Blood Updated:  09/16/22 0541     Blood Culture, Routine Gram stain aer bottle: Gram positive cocci in clusters resembling Staph      Positive results previously called 09/16/2022  05:41    Narrative:      Rt AC    Blood culture [[id number]] Collected: 09/15/22 1142    Order Status: Completed Specimen: Blood Updated: 09/16/22 0503     Blood Culture, Routine Gram stain aer bottle: Gram positive cocci in clusters resembling Staph      Results called to and read back by:Estrellita Eric RN  09/16/2022  05:03    Narrative:      Rt wrist    Blood culture [[id number]]     Order Status: Canceled Specimen: Blood     Blood culture [[id number]]     Order Status: Canceled Specimen: Blood     MRSA/SA Rapid ID by PCR from Blood culture [[id number]]  (Abnormal) Collected: 09/15/22 0050    Order Status: Completed Updated: 09/15/22 0156     Staph aureus ID by PCR Positive     MRSA ID by PCR Positive    Influenza A & B by Molecular [[id number]] Collected: 09/14/22 0632    Order Status: Completed Specimen: Nasopharyngeal Swab Updated: 09/14/22 0725     Influenza A, Molecular Negative     Influenza B, Molecular Negative     Flu A & B Source Nasal swab            Estimated Creatinine Clearance: 45.1 mL/min (A) (based on SCr of 1.7 mg/dL (H)).    Diagnostic Results:  Assessment and Plan:   72M with hx of HFrEF (EF 15%, S/p CRT-D, on chronic  2.5), CAD (s/p multiple remote PCI, reported non-obstructive C 1/2022), Vtach, HTN, HLD, and CKD who presented with 1 day upper abdominal pain and SOB found to have decompensated HF with progressive hypoxic respiratory failure while in the ED initiated on IV lasix and BiPAP.  Patient was found to be bacteremic with MRSA, suspect PICC related     * Chronic combined systolic and diastolic congestive heart failure  - Dobutamine 2.5 mcg/kg/min  - Continue with Entresto 24-26, mg BID  - Holding: Spironolactone 25 mg, daily, Jardiance and was previously on Entresto 49-51 mg, BID  - Lasix held avoid over-diuresing  - Daily  weights, Strict I/Os  - Monitor electrolytes and Maintain Mg >2 and K >4  -Telemetry monitoring    Bacteremia due to methicillin resistant Staphylococcus aureus  - Monitor WBC count and fever curve, pan-culture if patient spikes  - ID consult and recommendations are appreciated  - Vancomycin 1,250, daily; Monitor level and renal panel  - Repeat blood cultures positive; will repeat as PICC line was recently removed  - Will avoid HARRY at this time, if patient has persistent bacteremia will re-consider    Coronary artery disease involving native coronary artery of native heart without angina pectoris  - Asprin 81 mg, daily  - Atorvastatin 80 mg, nightly    H/O ventricular tachycardia  - Amiodarone 300 mg, daily  - Monitor LFTs      Desirae Meyers MD  Heart Transplant  [facility] - Cardiology Stepdown

## 2022-09-17 NOTE — SUBJECTIVE & OBJECTIVE
Interval History:   No overnight acute events were reported. Patient remains in contact precautions for MRSA septecemia. Repeat cultures 9/16 appear to also be positive. ID recommendations appreciated. Patient today states he is feeling the same, experiencing some hip pain bilaterally.     Continuous Infusions:   DOBUTamine IV infusion (non-titrating) 2.5 mcg/kg/min (09/16/22 1237)     Scheduled Meds:   acetaminophen  650 mg Oral QID    allopurinoL  200 mg Oral Daily    amiodarone  300 mg Oral Daily    aspirin  81 mg Oral Daily    atorvastatin  80 mg Oral Daily    enoxaparin  40 mg Subcutaneous Daily    LIDOcaine  1 patch Transdermal Q24H    mupirocin   Nasal BID    sacubitriL-valsartan  1 tablet Oral BID    vancomycin (VANCOCIN) IVPB  1,250 mg Intravenous Q24H     PRN Meds:HYDROcodone-acetaminophen, methocarbamoL, ondansetron, sodium chloride 0.9%, Pharmacy to dose Vancomycin consult **AND** vancomycin - pharmacy to dose    Review of patient's allergies indicates:   Allergen Reactions    Iodine containing multivitamin Swelling     itching    Keflex [cephalexin] Swelling     Eyes.  Tolerated multiple doses of zosyn and 1 dose of augmentin in 2015 and 2016, respectively    Peaches [peach (prunus persica)] Swelling     eyes    Shellfish containing products Swelling    Fig tree Swelling     itching    Tuberculin spenser test ppd Rash     Objective:     Vital Signs (Most Recent):  Temp: 98.2 °F (36.8 °C) (09/16/22 2348)  Pulse: 80 (09/17/22 0300)  Resp: 16 (09/16/22 2348)  BP: (!) 106/57 (09/16/22 2348)  SpO2: 98 % (09/16/22 2348)   Vital Signs (24h Range):  Temp:  [97.6 °F (36.4 °C)-99.5 °F (37.5 °C)] 98.2 °F (36.8 °C)  Pulse:  [80-97] 80  Resp:  [16-20] 16  SpO2:  [94 %-98 %] 98 %  BP: (101-111)/(49-58) 106/57     Patient Vitals for the past 72 hrs (Last 3 readings):   Weight   09/15/22 0840 98 kg (216 lb)   09/14/22 0936 98 kg (216 lb)     Body mass index is 33.83 kg/m².      Intake/Output Summary (Last 24 hours) at  9/17/2022 0613  Last data filed at 9/16/2022 1800  Gross per 24 hour   Intake --   Output 250 ml   Net -250 ml       Physical Exam  Constitutional:       Appearance: Normal appearance. He is obese.   HENT:      Head: Atraumatic.   Eyes:      Conjunctiva/sclera: Conjunctivae normal.   Cardiovascular:      Rate and Rhythm: Normal rate and regular rhythm.   Pulmonary:      Effort: Pulmonary effort is normal.      Breath sounds: No wheezing or rhonchi.   Abdominal:      General: There is distension.      Palpations: Abdomen is soft. There is no mass.      Tenderness: There is no abdominal tenderness.   Musculoskeletal:      Right lower leg: No edema.      Left lower leg: No edema.   Skin:     General: Skin is warm.   Neurological:      Mental Status: He is alert.       Significant Labs:  CBC:  Recent Labs   Lab 09/15/22  0320 09/16/22  0443 09/17/22  0346   WBC 9.29 5.87 4.05   RBC 4.09* 3.90* 3.82*   HGB 11.5* 10.9* 10.5*   HCT 36.1* 33.8* 32.0*   * 114* 120*   MCV 88 87 84   MCH 28.1 27.9 27.5   MCHC 31.9* 32.2 32.8     BNP:  Recent Labs   Lab 09/12/22  1805 09/14/22  0537   * 335*     CMP:  Recent Labs   Lab 09/15/22  0320 09/15/22  1502 09/16/22  0443 09/17/22  0346   * 123* 111* 98   CALCIUM 8.7 8.2* 8.7 8.6*   ALBUMIN 3.4*  --  3.1* 2.8*   PROT 6.4  --  6.3 6.1    132* 136 135*   K 4.7 4.1 4.1 4.0   CO2 22* 25 24 23    104 103 101   BUN 40* 40* 42* 46*   CREATININE 2.4* 2.2* 1.7* 1.7*   ALKPHOS 51*  --  49* 53*   ALT 23  --  25 20   AST 30  --  27 22   BILITOT 0.8  --  0.8 0.7      Coagulation:   No results for input(s): PT, INR, APTT in the last 168 hours.  LDH:  Recent Labs   Lab 09/14/22  1241   *     Microbiology:  Microbiology Results (last 7 days)       Procedure Component Value Units Date/Time    Blood culture [[id number]] Collected: 09/16/22 0443    Order Status: Completed Specimen: Blood Updated: 09/17/22 0535     Blood Culture, Routine Gram stain aer bottle: Gram  positive cocci in clusters resembling Staph      Positive results previously called 09/17/2022  05:34    Narrative:      Lft wrist    Blood culture [[id number]] Collected: 09/16/22 0443    Order Status: Completed Specimen: Blood Updated: 09/16/22 2256     Blood Culture, Routine Gram stain aer bottle: Gram positive cocci in clusters resembling Staph      Results called to and read back by:Estrellita Eric RN 09/16/2022  22:55    Narrative:      Rt hand    Blood culture [[id number]]  (Abnormal) Collected: 09/14/22 1259    Order Status: Completed Specimen: Blood from Peripheral, Antecubital, Left Updated: 09/16/22 0902     Blood Culture, Routine Gram stain robert bottle: Gram positive cocci in clusters resembling Staph      Results called to and read back by: Gilbert Castle RN,  09/15/2022  00:50      Gram stain aer bottle: Gram positive cocci in clusters resembling Staph      Positive results previously called 09/15/2022  03:06      STAPHYLOCOCCUS AUREUS  [facility].  For susceptibility see order #[id number]      Blood culture [[id number]]  (Abnormal) Collected: 09/14/22 1302    Order Status: Completed Specimen: Blood from Wrist, Left Updated: 09/16/22 0854     Blood Culture, Routine Gram stain robert bottle: Gram positive cocci      Positive results previously called 09/15/2022  02:59      STAPHYLOCOCCUS AUREUS  Susceptibility pending  ID consult required at [facility].      Blood culture [[id number]] Collected: 09/15/22 1142    Order Status: Completed Specimen: Blood Updated: 09/16/22 0541     Blood Culture, Routine Gram stain aer bottle: Gram positive cocci in clusters resembling Staph      Positive results previously called 09/16/2022  05:41    Narrative:      Rt AC    Blood culture [[id number]] Collected: 09/15/22 1142    Order Status: Completed Specimen: Blood Updated: 09/16/22 0503     Blood Culture, Routine Gram stain aer bottle: Gram positive  cocci in clusters resembling Staph      Results called to and read back by:Estrellita Eric RN  09/16/2022  05:03    Narrative:      Rt wrist    Blood culture [[id number]]     Order Status: Canceled Specimen: Blood     Blood culture [[id number]]     Order Status: Canceled Specimen: Blood     MRSA/SA Rapid ID by PCR from Blood culture [[id number]]  (Abnormal) Collected: 09/15/22 0050    Order Status: Completed Updated: 09/15/22 0156     Staph aureus ID by PCR Positive     MRSA ID by PCR Positive    Influenza A & B by Molecular [[id number]] Collected: 09/14/22 0632    Order Status: Completed Specimen: Nasopharyngeal Swab Updated: 09/14/22 0725     Influenza A, Molecular Negative     Influenza B, Molecular Negative     Flu A & B Source Nasal swab            Estimated Creatinine Clearance: 45.1 mL/min (A) (based on SCr of 1.7 mg/dL (H)).    Diagnostic Results:

## 2022-09-17 NOTE — PLAN OF CARE
Problem: Adult Inpatient Plan of Care  Goal: Plan of Care Review  Outcome: Ongoing, Progressing     Problem: Adjustment to Illness (Sepsis/Septic Shock)  Goal: Optimal Coping  Outcome: Ongoing, Progressing  Intervention: Optimize Psychosocial Adjustment to Illness  Flowsheets (Taken 9/17/2022 0208)  Supportive Measures: active listening utilized     Problem: Infection  Goal: Absence of Infection Signs and Symptoms  Outcome: Ongoing, Progressing  Intervention: Prevent or Manage Infection  Flowsheets (Taken 9/17/2022 0208)  Infection Management: aseptic technique maintained  Isolation Precautions: precautions maintained     Problem: Impaired Wound Healing  Goal: Optimal Wound Healing  Outcome: Ongoing, Progressing  Intervention: Promote Wound Healing  Flowsheets (Taken 9/17/2022 0208)  Sleep/Rest Enhancement: awakenings minimized  Activity Management: Rolling - L1   gtt infusing per orders. ABX therapy maintained. Contact precautions maintained. Patient remains free from falls and injuries through out shift. Patient AAO and VSS. Patient denies chest pain and SOB. Plan of care reviewed with patient. Patient verbalizes understanding of plan.  Will continue to monitor.

## 2022-09-17 NOTE — ASSESSMENT & PLAN NOTE
- Dobutamine 2.5 mcg/kg/min  - Continue with Entresto 24-26, mg BID  - Holding: Spironolactone 25 mg, daily, Jardiance and was previously on Entresto 49-51 mg, BID  - Lasix held avoid over-diuresing  - Daily weights, Strict I/Os  - Monitor electrolytes and Maintain Mg >2 and K >4  -Telemetry monitoring

## 2022-09-17 NOTE — ASSESSMENT & PLAN NOTE
Unclear source of bacteremia however potential sources include CVC which has been removed, and translocation from skin in the setting of multiple skin eschars.   · Management plan as detailed below.

## 2022-09-17 NOTE — ASSESSMENT & PLAN NOTE
Septicemia on admission due to MRSA bacteremia. Now afebrile and without leukocytosis. Blood cultures 9/14-9/6 remain positive for MRSA.   · Continue vancomycin.   · PharmD service to manage vancomycin dosing and drug monitoring.   · Repeat blood cultures on 9/18 (ordered).   · Consider HARRY to rule out infective endocarditis.

## 2022-09-18 PROBLEM — L03.114 CELLULITIS OF LEFT FOREARM: Status: ACTIVE | Noted: 2022-01-01

## 2022-09-18 NOTE — SUBJECTIVE & OBJECTIVE
Interval History:   No overnight acute events were reported. Patient would require about 100 cc of IV contrast for CT-pancreatic protocol (based on weight) which in view of sepsis requiring vancomycin and concern for nephrotoxicity will hold on pursuing imaging while inpatient. Patient remains afebrile without leukocytosis, prior cultures still positive for MRSA. Will follow up repeat obtained today. Patient remains in contact precautions for MRSA septecemia. HARRY is recommended but given patients clinical condition would prefer to defer at this time unless persistent bacteremic after antibiotic course. Will recheck cultures today    Continuous Infusions:   DOBUTamine IV infusion (non-titrating) 2.5 mcg/kg/min (09/17/22 1451)     Scheduled Meds:   acetaminophen  650 mg Oral QID    allopurinoL  200 mg Oral Daily    amiodarone  300 mg Oral Daily    aspirin  81 mg Oral Daily    atorvastatin  80 mg Oral Daily    enoxaparin  40 mg Subcutaneous Daily    LIDOcaine  1 patch Transdermal Q24H    mupirocin   Nasal BID    sacubitriL-valsartan  1 tablet Oral BID    vancomycin (VANCOCIN) IVPB  1,250 mg Intravenous Q24H     PRN Meds:HYDROcodone-acetaminophen, methocarbamoL, ondansetron, senna-docusate 8.6-50 mg, sodium chloride 0.9%, Pharmacy to dose Vancomycin consult **AND** vancomycin - pharmacy to dose    Review of patient's allergies indicates:   Allergen Reactions    Iodine containing multivitamin Swelling     itching    Keflex [cephalexin] Swelling     Eyes.  Tolerated multiple doses of zosyn and 1 dose of augmentin in 2015 and 2016, respectively    Peaches [peach (prunus persica)] Swelling     eyes    Shellfish containing products Swelling    Fig tree Swelling     itching    Tuberculin spenser test ppd Rash     Objective:     Vital Signs (Most Recent):  Temp: 96.7 °F (35.9 °C) (09/18/22 0459)  Pulse: 84 (09/18/22 0458)  Resp: 18 (09/18/22 0606)  BP: (!) 117/56 (09/18/22 0458)  SpO2: 100 % (09/18/22 0458)   Vital Signs (24h  Range):  Temp:  [96.7 °F (35.9 °C)-98.4 °F (36.9 °C)] 96.7 °F (35.9 °C)  Pulse:  [78-84] 84  Resp:  [16-18] 18  SpO2:  [93 %-100 %] 100 %  BP: ()/(47-56) 117/56     Patient Vitals for the past 72 hrs (Last 3 readings):   Weight   09/18/22 0458 96.8 kg (213 lb 6.5 oz)   09/17/22 0700 97.4 kg (214 lb 11.7 oz)   09/15/22 0840 98 kg (216 lb)       Body mass index is 33.42 kg/m².      Intake/Output Summary (Last 24 hours) at 9/18/2022 0620  Last data filed at 9/18/2022 0400  Gross per 24 hour   Intake 720 ml   Output 1125 ml   Net -405 ml         Physical Exam  Constitutional:       Appearance: Normal appearance. He is obese.   HENT:      Head: Atraumatic.   Eyes:      Conjunctiva/sclera: Conjunctivae normal.   Cardiovascular:      Rate and Rhythm: Normal rate and regular rhythm.   Pulmonary:      Effort: Pulmonary effort is normal.      Breath sounds: No wheezing or rhonchi.   Abdominal:      General: There is distension.      Palpations: Abdomen is soft. There is no mass.      Tenderness: There is no abdominal tenderness.   Musculoskeletal:      Right lower leg: No edema.      Left lower leg: No edema.   Skin:     General: Skin is warm.      Comments: Right arm, IV line infiltrated, erythematous; multiple bruising in bilateral arms   Neurological:      Mental Status: He is alert.       Significant Labs:  CBC:  Recent Labs   Lab 09/15/22  0320 09/16/22  0443 09/17/22  0346   WBC 9.29 5.87 4.05   RBC 4.09* 3.90* 3.82*   HGB 11.5* 10.9* 10.5*   HCT 36.1* 33.8* 32.0*   * 114* 120*   MCV 88 87 84   MCH 28.1 27.9 27.5   MCHC 31.9* 32.2 32.8       BNP:  Recent Labs   Lab 09/12/22  1805 09/14/22  0537   * 335*       CMP:  Recent Labs   Lab 09/15/22  0320 09/15/22  1502 09/16/22  0443 09/17/22  0346   * 123* 111* 98   CALCIUM 8.7 8.2* 8.7 8.6*   ALBUMIN 3.4*  --  3.1* 2.8*   PROT 6.4  --  6.3 6.1    132* 136 135*   K 4.7 4.1 4.1 4.0   CO2 22* 25 24 23    104 103 101   BUN 40* 40* 42* 46*    CREATININE 2.4* 2.2* 1.7* 1.7*   ALKPHOS 51*  --  49* 53*   ALT 23  --  25 20   AST 30  --  27 22   BILITOT 0.8  --  0.8 0.7        Coagulation:   No results for input(s): PT, INR, APTT in the last 168 hours.  LDH:  No results for input(s): LDH in the last 72 hours.    Microbiology:  Microbiology Results (last 7 days)       Procedure Component Value Units Date/Time    Blood culture [[id number]]  (Abnormal) Collected: 09/14/22 1259    Order Status: Completed Specimen: Blood from Peripheral, Antecubital, Left Updated: 09/17/22 1005     Blood Culture, Routine Gram stain robert bottle: Gram positive cocci in clusters resembling Staph      Results called to and read back by: Gilbert Castle RN,  09/15/2022  00:50      Gram stain aer bottle: Gram positive cocci in clusters resembling Staph      Positive results previously called 09/15/2022  03:06      METHICILLIN RESISTANT STAPHYLOCOCCUS AUREUS  ID consult required at [facility].  For susceptibility see order #[id number]      Blood culture [[id number]]  (Abnormal)  (Susceptibility) Collected: 09/14/22 1302    Order Status: Completed Specimen: Blood from Wrist, Left Updated: 09/17/22 1004     Blood Culture, Routine Gram stain robert bottle: Gram positive cocci      Positive results previously called 09/15/2022  02:59      METHICILLIN RESISTANT STAPHYLOCOCCUS AUREUS  ID consult required at [facility].      Blood culture [[id number]]  (Abnormal) Collected: 09/16/22 0443    Order Status: Completed Specimen: Blood Updated: 09/17/22 0919     Blood Culture, Routine Gram stain aer bottle: Gram positive cocci in clusters resembling Staph      Results called to and read back by:Estrellita Eric RN 09/16/2022  22:55      METHICILLIN RESISTANT STAPHYLOCOCCUS AUREUS  ID consult required at [facility].  For susceptibility see order #[id number]      Narrative:      Rt hand    Blood culture [[id number]]   (Abnormal) Collected: 09/15/22 1142    Order Status: Completed Specimen: Blood Updated: 09/17/22 0916     Blood Culture, Routine Gram stain aer bottle: Gram positive cocci in clusters resembling Staph      Positive results previously called 09/16/2022  05:41      METHICILLIN RESISTANT STAPHYLOCOCCUS AUREUS  ID consult required at [facility].  For susceptibility see order #[id number]      Narrative:      Rt AC    Blood culture [[id number]]  (Abnormal) Collected: 09/15/22 1142    Order Status: Completed Specimen: Blood Updated: 09/17/22 0915     Blood Culture, Routine Gram stain aer bottle: Gram positive cocci in clusters resembling Staph      Results called to and read back by:Estrellita Eric RN  09/16/2022  05:03      METHICILLIN RESISTANT STAPHYLOCOCCUS AUREUS  ID consult required at [facility].  For susceptibility see order #[id number]      Narrative:      Rt wrist    Blood culture [[id number]] Collected: 09/16/22 0443    Order Status: Completed Specimen: Blood Updated: 09/17/22 0535     Blood Culture, Routine Gram stain aer bottle: Gram positive cocci in clusters resembling Staph      Positive results previously called 09/17/2022  05:34    Narrative:      Lft wrist    Blood culture [[id number]]     Order Status: Canceled Specimen: Blood     Blood culture [[id number]]     Order Status: Canceled Specimen: Blood     MRSA/SA Rapid ID by PCR from Blood culture [[id number]]  (Abnormal) Collected: 09/15/22 0050    Order Status: Completed Updated: 09/15/22 0156     Staph aureus ID by PCR Positive     MRSA ID by PCR Positive    Influenza A & B by Molecular [[id number]] Collected: 09/14/22 0632    Order Status: Completed Specimen: Nasopharyngeal Swab Updated: 09/14/22 0725     Influenza A, Molecular Negative     Influenza B, Molecular Negative     Flu A & B Source Nasal swab            Estimated Creatinine Clearance: 44.8 mL/min (A) (based on SCr of 1.7 mg/dL  (H)).    Diagnostic Results:

## 2022-09-18 NOTE — SUBJECTIVE & OBJECTIVE
"Interval History: "Maybe a little better". Blood cultures with MRSA 9/14-9/16. Prior PICC removed by primary.    Review of Systems   Constitutional:  Negative for chills, fatigue and fever.   HENT:  Negative for ear pain, mouth sores, nosebleeds, postnasal drip, rhinorrhea, sinus pressure, sore throat, tinnitus, trouble swallowing and voice change.    Eyes:  Negative for photophobia, pain, redness and visual disturbance.   Respiratory:  Negative for apnea, cough, chest tightness, shortness of breath and wheezing.    Cardiovascular:  Negative for chest pain, palpitations and leg swelling.   Gastrointestinal:  Positive for abdominal distention. Negative for abdominal pain, blood in stool, constipation, diarrhea, nausea and vomiting.   Endocrine: Negative for cold intolerance, heat intolerance, polydipsia and polyuria.   Genitourinary:  Negative for decreased urine volume, difficulty urinating, dysuria, flank pain, frequency, genital sores, hematuria, penile discharge, penile pain, penile swelling, scrotal swelling, testicular pain and urgency.   Musculoskeletal:  Negative for arthralgias, back pain, joint swelling, myalgias and neck pain.   Skin:  Negative for color change and rash.   Allergic/Immunologic: Negative for environmental allergies and food allergies.   Neurological:  Negative for dizziness, seizures, syncope, weakness, light-headedness, numbness and headaches.   Hematological:  Negative for adenopathy. Does not bruise/bleed easily.   Psychiatric/Behavioral:  Negative for agitation, confusion, decreased concentration, hallucinations, self-injury, sleep disturbance and suicidal ideas. The patient is not nervous/anxious.    Objective:     Vital Signs (Most Recent):  Temp: 98.2 °F (36.8 °C) (09/17/22 1943)  Pulse: 79 (09/17/22 1943)  Resp: 18 (09/17/22 1943)  BP: (!) 95/47 (09/17/22 1943)  SpO2: 98 % (09/17/22 1943)   Vital Signs (24h Range):  Temp:  [97.6 °F (36.4 °C)-98.4 °F (36.9 °C)] 98.2 °F (36.8 " °C)  Pulse:  [79-97] 79  Resp:  [16-20] 18  SpO2:  [93 %-98 %] 98 %  BP: ()/(47-58) 95/47     Weight: 97.4 kg (214 lb 11.7 oz)  Body mass index is 33.63 kg/m².    Estimated Creatinine Clearance: 45 mL/min (A) (based on SCr of 1.7 mg/dL (H)).    Physical Exam  Vitals reviewed.   Constitutional:       Appearance: He is well-developed.   HENT:      Head: Normocephalic and atraumatic.      Right Ear: External ear normal.      Left Ear: External ear normal.   Eyes:      Conjunctiva/sclera: Conjunctivae normal.   Neck:      Thyroid: No thyromegaly.   Cardiovascular:      Rate and Rhythm: Normal rate and regular rhythm.      Heart sounds: Normal heart sounds. No murmur heard.  Pulmonary:      Effort: Pulmonary effort is normal.      Breath sounds: Normal breath sounds. No wheezing or rales.   Abdominal:      General: Bowel sounds are normal. There is distension.      Palpations: Abdomen is soft. There is no mass.      Tenderness: There is no abdominal tenderness. There is no rebound.   Musculoskeletal:         General: Normal range of motion.      Cervical back: Normal range of motion and neck supple.   Lymphadenopathy:      Cervical: No cervical adenopathy.   Skin:     General: Skin is warm and dry.      Comments: Multiple eschars over the upper extremities.   Neurological:      Mental Status: He is alert and oriented to person, place, and time.   Psychiatric:         Behavior: Behavior normal.       Significant Labs: Blood Culture:   Recent Labs   Lab 06/22/22  0245 09/14/22  1259 09/14/22  1302 09/15/22  1142 09/16/22  0443   LABBLOO No growth after 5 days. Gram stain robert bottle: Gram positive cocci in clusters resembling Staph  Results called to and read back by: Gilbert Castle RN,  09/15/2022  00:50  Gram stain aer bottle: Gram positive cocci in clusters resembling Staph  Positive results previously called 09/15/2022  03:06  METHICILLIN RESISTANT STAPHYLOCOCCUS AUREUS  ID consult required at [facility].  For susceptibility see order #[id number]  * Gram stain robert bottle: Gram positive cocci  Positive results previously called 09/15/2022  02:59  METHICILLIN RESISTANT STAPHYLOCOCCUS AUREUS  ID consult required at [facility].  * Gram stain aer bottle: Gram positive cocci in clusters resembling Staph  Positive results previously called 09/16/2022  05:41  METHICILLIN RESISTANT STAPHYLOCOCCUS AUREUS  ID consult required at [facility].  For susceptibility see order #[id number]  *  Gram stain aer bottle: Gram positive cocci in clusters resembling Staph  Results called to and read back by:Estrellita Eric RN  09/16/2022  05:03  METHICILLIN RESISTANT STAPHYLOCOCCUS AUREUS  ID consult required at [facility].  For susceptibility see order #[id number]  * Gram stain aer bottle: Gram positive cocci in clusters resembling Staph  Results called to and read back by:Estrellita Eric RN 09/16/2022  22:55  METHICILLIN RESISTANT STAPHYLOCOCCUS AUREUS  ID consult required at [facility].  For susceptibility see order #[id number]  *  Gram stain aer bottle: Gram positive cocci in clusters resembling Staph  Positive results previously called 09/17/2022  05:34     BMP:   Recent Labs   Lab 09/17/22  0346   GLU 98   *   K 4.0      CO2 23   BUN 46*   CREATININE 1.7*   CALCIUM 8.6*   MG 2.3     CBC:   Recent Labs   Lab 09/16/22  0443 09/17/22  0346   WBC 5.87 4.05   HGB 10.9* 10.5*   HCT 33.8* 32.0*   * 120*     Wound Culture: No results for input(s): LABAERO in the last 4320 hours.    Significant Imaging: I have reviewed all pertinent imaging results/findings within the past 24 hours.

## 2022-09-18 NOTE — ASSESSMENT & PLAN NOTE
- Gastroenterology consult and recommendations appreciated  - Unable to obtain MRI for further evaluation given CRT-D  - Will hold off on obtaining CT with pancreatic protocol at this time in view of active infection requiring vancomycin and risk of contrast-induced nephrotoxicity in this patient

## 2022-09-18 NOTE — PROGRESS NOTES
[facility] - Cardiology Stepdown  Infectious Disease  Progress Note    Patient Name: Audrey Oneil Jr.  MRN: [id number]  Admission Date: 9/14/2022  Length of Stay: 4 days  Attending Physician: Migue Henry Jr.,*  Primary Care Provider: Primary Doctor No    Isolation Status: Contact  Assessment/Plan:      Septicemia due to methicillin resistant Staphylococcus aureus  Septicemia on admission due to MRSA bacteremia. Now afebrile and without leukocytosis. Blood cultures 9/14-9/6 remain positive for MRSA.   · Continue vancomycin.   · PharmD service to manage vancomycin dosing and drug monitoring.   · Repeat blood cultures today 9/18 (ordered).   · Consider HARRY to rule out infective endocarditis.     Bacteremia due to methicillin resistant Staphylococcus aureus  Unclear source of bacteremia however potential sources include CVC which has been removed, and translocation from skin in the setting of multiple skin eschars.   · Management plan as detailed below.     Cellulitis of left forearm  Erythema, edema, tenderness and warmth of the left forearm. Patient reports this was a prior IV site.   · Continue vancomycin.   · Ice and elevation.   · Consider ultrasounds to assess for phlebitis.   · If unable to get therapeutic vancomycin levels and ongoing concern for vancomycin related adverse events then will consider transitioning to other antistaphylococcal agent.       Anticipated Disposition: per primary    Thank you for your consult. I will follow-up with patient. Please contact us if you have any additional questions.    Shanti Sharma MD  Infectious Disease  [facility] - Cardiology Stepdown    Subjective:     Principal Problem:Chronic combined systolic and diastolic congestive heart failure    HPI: HPI: Patient is a 73yo, male, with PMH of CAD s/p MI, chronic combined systolic and diastolic heart failure on home dobutamine (EF15%; NYHA-3), HTN/HLD, CKD 3, chronic back pain, cardiac resynch therapy defib (CRT-D), hx  of recurrent Vtach and cardiogenic shock (01/2022) at [facility]; which prompted patient re-decision to pursue eval / consideration for LVAD and potential heart transplant recipient. Currently patient case being deferred for transplant recipient selection pending further of recently found pancreatic mass with advanced Imaging study to consider ICD in place; but pt is also still under consideration for DT-LVAD.  --Patient presented to ED (09/14) for new acute onset of central generalized abdominal pain with Right flank pain, headache, nausea, fever (102.2F on arrival), with tachycardia (up to 114bpm on arrival), abdominal distention, SOB requiring 4L home oxygen which began less than 12hrs after eating packaged spaghetti - reports being compliant with at-home medications.     Patient found to have MRSA-bacteremia w/ sepsis criteria on arrival (101.2F, 114bpm); unclear source of infection -- possibly eschar burn wound at left wrist (occurred 4-days ago) vs  PICC line less likley due to absence of signs of infected. Blood cxr x2 (09/14): speciated Staph aureus (ID / susc pending); but MRSA-ID PCR: Positive (09/15).   Patient started on IV-Vanc (2g). Fever reduced with anti-pyretic, stable at 99F, endorses sweats. Remains without leukocytosis.   (09/16) CrCL: 45.1mL/min, up trend from (CrCl:35) day prior 09/15.    Repeat blood cxrs x2 (09/15): GPC-cluster, resembling staph (ID / susc pending)  Repeat blood cxr x2 (09/16): sent --awaiting results.  TTE (09/14): negative for valve vegetations or abscesses; showed left sided atrial and ventricular chamber abnormalities; EF10-15%, LV-diastolic dysfunction; mild mitral regurg.  09/14: Chest-xr: shows increasing pulmonary interstitial edema.  09/14: CT-AP (w/o contrast): No acute abdomen/pelvic abnormality to account for pt hx of pain. showed multiple stable indeterminate pancreatic hypoattenuating lesions. Sigmoid colonic diverticulosis without diverticulitis or other bowel  "inflammatory process. Cholelithiasis. Cardiomegaly and trace pericardial fluid.          Interval History: "Maybe a little better". Blood cultures with MRSA 9/14-9/16. Prior PICC removed by primary. Now with erythema of the left forearm concerning for phlebitis.     Review of Systems   Constitutional:  Negative for chills, fatigue and fever.   HENT:  Negative for ear pain, mouth sores, nosebleeds, postnasal drip, rhinorrhea, sinus pressure, sore throat, tinnitus, trouble swallowing and voice change.    Eyes:  Negative for photophobia, pain, redness and visual disturbance.   Respiratory:  Negative for apnea, cough, chest tightness, shortness of breath and wheezing.    Cardiovascular:  Negative for chest pain, palpitations and leg swelling.   Gastrointestinal:  Positive for abdominal distention and abdominal pain. Negative for blood in stool, constipation, diarrhea, nausea and vomiting.   Endocrine: Negative for cold intolerance, heat intolerance, polydipsia and polyuria.   Genitourinary:  Negative for decreased urine volume, difficulty urinating, dysuria, flank pain, frequency, genital sores, hematuria, penile discharge, penile pain, penile swelling, scrotal swelling, testicular pain and urgency.   Musculoskeletal:  Positive for myalgias. Negative for arthralgias, back pain, joint swelling and neck pain.   Skin:  Negative for color change and rash.   Allergic/Immunologic: Negative for environmental allergies and food allergies.   Neurological:  Negative for dizziness, seizures, syncope, weakness, light-headedness, numbness and headaches.   Hematological:  Negative for adenopathy. Does not bruise/bleed easily.   Psychiatric/Behavioral:  Negative for agitation, confusion, decreased concentration, hallucinations, self-injury, sleep disturbance and suicidal ideas. The patient is not nervous/anxious.    Objective:     Vital Signs (Most Recent):  Temp: 97.5 °F (36.4 °C) (09/18/22 0804)  Pulse: 73 (09/18/22 1155)  Resp: 16 " (09/18/22 1155)  BP: (!) 113/58 (09/18/22 1155)  SpO2: (!) 94 % (09/18/22 1350)   Vital Signs (24h Range):  Temp:  [96.7 °F (35.9 °C)-98.2 °F (36.8 °C)] 97.5 °F (36.4 °C)  Pulse:  [73-84] 73  Resp:  [16-18] 16  SpO2:  [92 %-100 %] 94 %  BP: ()/(47-60) 113/58     Weight: 96.8 kg (213 lb 6.5 oz)  Body mass index is 33.42 kg/m².    Estimated Creatinine Clearance: 54.4 mL/min (based on SCr of 1.4 mg/dL).    Physical Exam  Vitals reviewed.   Constitutional:       Appearance: He is well-developed.   HENT:      Head: Normocephalic and atraumatic.      Right Ear: External ear normal.      Left Ear: External ear normal.   Eyes:      Conjunctiva/sclera: Conjunctivae normal.   Neck:      Thyroid: No thyromegaly.   Cardiovascular:      Rate and Rhythm: Normal rate and regular rhythm.      Heart sounds: Normal heart sounds. No murmur heard.  Pulmonary:      Effort: Pulmonary effort is normal.      Breath sounds: Normal breath sounds. No wheezing or rales.   Abdominal:      General: Bowel sounds are normal. There is distension.      Palpations: Abdomen is soft. There is no mass.      Tenderness: There is no abdominal tenderness. There is no rebound.   Musculoskeletal:         General: Normal range of motion.        Arms:       Cervical back: Normal range of motion and neck supple.   Lymphadenopathy:      Cervical: No cervical adenopathy.   Skin:     General: Skin is warm and dry.      Comments: Multiple eschars over the upper extremities.   Neurological:      Mental Status: He is alert and oriented to person, place, and time.   Psychiatric:         Behavior: Behavior normal.       Significant Labs: Blood Culture:   Recent Labs   Lab 06/22/22  0245 09/14/22  1259 09/14/22  1302 09/15/22  1142 09/16/22  0443   LABBLOO No growth after 5 days. Gram stain robert bottle: Gram positive cocci in clusters resembling Staph  Results called to and read back by: Gilbert Castle RN,  09/15/2022  00:50  Gram stain aer bottle: Gram positive  cocci in clusters resembling Staph  Positive results previously called 09/15/2022  03:06  METHICILLIN RESISTANT STAPHYLOCOCCUS AUREUS  ID consult required at [facility].  For susceptibility see order #[id number]  * Gram stain robert bottle: Gram positive cocci  Positive results previously called 09/15/2022  02:59  METHICILLIN RESISTANT STAPHYLOCOCCUS AUREUS  ID consult required at [facility].  * Gram stain aer bottle: Gram positive cocci in clusters resembling Staph  Positive results previously called 09/16/2022  05:41  METHICILLIN RESISTANT STAPHYLOCOCCUS AUREUS  ID consult required at [facility].  For susceptibility see order #[id number]  *  Gram stain aer bottle: Gram positive cocci in clusters resembling Staph  Results called to and read back by:Estrellita Eric RN  09/16/2022  05:03  METHICILLIN RESISTANT STAPHYLOCOCCUS AUREUS  ID consult required at [facility].  For susceptibility see order #[id number]  * Gram stain aer bottle: Gram positive cocci in clusters resembling Staph  Results called to and read back by:Estrellita Eric RN 09/16/2022  22:55  METHICILLIN RESISTANT STAPHYLOCOCCUS AUREUS  ID consult required at [facility].  For susceptibility see order #[id number]  *  Gram stain aer bottle: Gram positive cocci in clusters resembling Staph  Positive results previously called 09/17/2022  05:34  METHICILLIN RESISTANT STAPHYLOCOCCUS AUREUS  ID consult required at [facility].  For susceptibility see order #[id number]  *     BMP:   Recent Labs   Lab 09/18/22  0728      *   K 4.0      CO2 25   BUN 44*   CREATININE 1.4   CALCIUM 8.4*   MG 2.4     CBC:   Recent Labs   Lab 09/17/22  0346 09/18/22  0728   WBC 4.05 4.65   HGB 10.5* 11.2*   HCT 32.0* 34.4*   * 142*     Wound Culture: No results for  input(s): MARTIN in the last 4320 hours.    Significant Imaging: I have reviewed all pertinent imaging results/findings within the past 24 hours.

## 2022-09-18 NOTE — SUBJECTIVE & OBJECTIVE
"Interval History: "Maybe a little better". Blood cultures with MRSA 9/14-9/16. Prior PICC removed by primary. Now with erythema of the left forearm concerning for phlebitis.     Review of Systems   Constitutional:  Negative for chills, fatigue and fever.   HENT:  Negative for ear pain, mouth sores, nosebleeds, postnasal drip, rhinorrhea, sinus pressure, sore throat, tinnitus, trouble swallowing and voice change.    Eyes:  Negative for photophobia, pain, redness and visual disturbance.   Respiratory:  Negative for apnea, cough, chest tightness, shortness of breath and wheezing.    Cardiovascular:  Negative for chest pain, palpitations and leg swelling.   Gastrointestinal:  Positive for abdominal distention and abdominal pain. Negative for blood in stool, constipation, diarrhea, nausea and vomiting.   Endocrine: Negative for cold intolerance, heat intolerance, polydipsia and polyuria.   Genitourinary:  Negative for decreased urine volume, difficulty urinating, dysuria, flank pain, frequency, genital sores, hematuria, penile discharge, penile pain, penile swelling, scrotal swelling, testicular pain and urgency.   Musculoskeletal:  Positive for myalgias. Negative for arthralgias, back pain, joint swelling and neck pain.   Skin:  Negative for color change and rash.   Allergic/Immunologic: Negative for environmental allergies and food allergies.   Neurological:  Negative for dizziness, seizures, syncope, weakness, light-headedness, numbness and headaches.   Hematological:  Negative for adenopathy. Does not bruise/bleed easily.   Psychiatric/Behavioral:  Negative for agitation, confusion, decreased concentration, hallucinations, self-injury, sleep disturbance and suicidal ideas. The patient is not nervous/anxious.    Objective:     Vital Signs (Most Recent):  Temp: 97.5 °F (36.4 °C) (09/18/22 0804)  Pulse: 73 (09/18/22 1155)  Resp: 16 (09/18/22 1155)  BP: (!) 113/58 (09/18/22 1155)  SpO2: (!) 94 % (09/18/22 1350)   Vital " Signs (24h Range):  Temp:  [96.7 °F (35.9 °C)-98.2 °F (36.8 °C)] 97.5 °F (36.4 °C)  Pulse:  [73-84] 73  Resp:  [16-18] 16  SpO2:  [92 %-100 %] 94 %  BP: ()/(47-60) 113/58     Weight: 96.8 kg (213 lb 6.5 oz)  Body mass index is 33.42 kg/m².    Estimated Creatinine Clearance: 54.4 mL/min (based on SCr of 1.4 mg/dL).    Physical Exam  Vitals reviewed.   Constitutional:       Appearance: He is well-developed.   HENT:      Head: Normocephalic and atraumatic.      Right Ear: External ear normal.      Left Ear: External ear normal.   Eyes:      Conjunctiva/sclera: Conjunctivae normal.   Neck:      Thyroid: No thyromegaly.   Cardiovascular:      Rate and Rhythm: Normal rate and regular rhythm.      Heart sounds: Normal heart sounds. No murmur heard.  Pulmonary:      Effort: Pulmonary effort is normal.      Breath sounds: Normal breath sounds. No wheezing or rales.   Abdominal:      General: Bowel sounds are normal. There is distension.      Palpations: Abdomen is soft. There is no mass.      Tenderness: There is no abdominal tenderness. There is no rebound.   Musculoskeletal:         General: Normal range of motion.        Arms:       Cervical back: Normal range of motion and neck supple.   Lymphadenopathy:      Cervical: No cervical adenopathy.   Skin:     General: Skin is warm and dry.      Comments: Multiple eschars over the upper extremities.   Neurological:      Mental Status: He is alert and oriented to person, place, and time.   Psychiatric:         Behavior: Behavior normal.       Significant Labs: Blood Culture:   Recent Labs   Lab 06/22/22  0245 09/14/22  1259 09/14/22  1302 09/15/22  1142 09/16/22  0443   LABBLOO No growth after 5 days. Gram stain robert bottle: Gram positive cocci in clusters resembling Staph  Results called to and read back by: Gilbert Castle RN,  09/15/2022  00:50  Gram stain aer bottle: Gram positive cocci in clusters resembling Staph  Positive results previously called 09/15/2022  03:06   METHICILLIN RESISTANT STAPHYLOCOCCUS AUREUS  ID consult required at [facility].  For susceptibility see order #[id number]  * Gram stain robert bottle: Gram positive cocci  Positive results previously called 09/15/2022  02:59  METHICILLIN RESISTANT STAPHYLOCOCCUS AUREUS  ID consult required at [facility].  * Gram stain aer bottle: Gram positive cocci in clusters resembling Staph  Positive results previously called 09/16/2022  05:41  METHICILLIN RESISTANT STAPHYLOCOCCUS AUREUS  ID consult required at [facility].  For susceptibility see order #[id number]  *  Gram stain aer bottle: Gram positive cocci in clusters resembling Staph  Results called to and read back by:Estrellita Eric RN  09/16/2022  05:03  METHICILLIN RESISTANT STAPHYLOCOCCUS AUREUS  ID consult required at [facility].  For susceptibility see order #[id number]  * Gram stain aer bottle: Gram positive cocci in clusters resembling Staph  Results called to and read back by:Estrellita Eric RN 09/16/2022  22:55  METHICILLIN RESISTANT STAPHYLOCOCCUS AUREUS  ID consult required at [facility].  For susceptibility see order #[id number]  *  Gram stain aer bottle: Gram positive cocci in clusters resembling Staph  Positive results previously called 09/17/2022  05:34  METHICILLIN RESISTANT STAPHYLOCOCCUS AUREUS  ID consult required at [facility].  For susceptibility see order #[id number]  *     BMP:   Recent Labs   Lab 09/18/22  0728      *   K 4.0      CO2 25   BUN 44*   CREATININE 1.4   CALCIUM 8.4*   MG 2.4     CBC:   Recent Labs   Lab 09/17/22  0346 09/18/22  0728   WBC 4.05 4.65   HGB 10.5* 11.2*   HCT 32.0* 34.4*   * 142*     Wound Culture: No results for input(s): LABAERO in the last 4320 hours.    Significant Imaging: I have reviewed all pertinent  imaging results/findings within the past 24 hours.

## 2022-09-18 NOTE — ASSESSMENT & PLAN NOTE
Septicemia on admission due to MRSA bacteremia. Now afebrile and without leukocytosis. Blood cultures 9/14-9/6 remain positive for MRSA.   · Continue vancomycin.   · PharmD service to manage vancomycin dosing and drug monitoring.   · Repeat blood cultures today 9/18 (ordered).   · Consider HARRY to rule out infective endocarditis.

## 2022-09-18 NOTE — PLAN OF CARE
Problem: Adult Inpatient Plan of Care  Goal: Plan of Care Review  Outcome: Ongoing, Progressing  Goal: Patient-Specific Goal (Individualized)  Outcome: Ongoing, Progressing  Goal: Absence of Hospital-Acquired Illness or Injury  Outcome: Ongoing, Progressing  Goal: Optimal Comfort and Wellbeing  Outcome: Ongoing, Progressing  Goal: Readiness for Transition of Care  Outcome: Ongoing, Progressing     Problem: Adjustment to Illness (Sepsis/Septic Shock)  Goal: Optimal Coping  Outcome: Ongoing, Progressing     Problem: Bleeding (Sepsis/Septic Shock)  Goal: Absence of Bleeding  Outcome: Ongoing, Progressing     Problem: Glycemic Control Impaired (Sepsis/Septic Shock)  Goal: Blood Glucose Level Within Desired Range  Outcome: Ongoing, Progressing     Problem: Infection Progression (Sepsis/Septic Shock)  Goal: Absence of Infection Signs and Symptoms  Outcome: Ongoing, Progressing     Problem: Nutrition Impaired (Sepsis/Septic Shock)  Goal: Optimal Nutrition Intake  Outcome: Ongoing, Progressing     Problem: Infection  Goal: Absence of Infection Signs and Symptoms  Outcome: Ongoing, Progressing     Problem: Impaired Wound Healing  Goal: Optimal Wound Healing  Outcome: Ongoing, Progressing

## 2022-09-18 NOTE — ASSESSMENT & PLAN NOTE
- Monitor WBC count and fever curve, pan-culture if patient spikes  - ID consult and recommendations are appreciated  - Vancomycin 1,250, daily; Monitor level and renal panel  - Repeat blood cultures positive; will repeat cultures today (9/18) as PICC line was recently removed and patient previously sub-therapeutic on vancomycin  - Will avoid HARRY at this time, if patient has persistent bacteremia will re-consider. Understand after discussions with ID that antibiotic course may require a prolonged course even after IV therapy if either device is infected vs patient having infective endocarditis

## 2022-09-18 NOTE — PROGRESS NOTES
[facility] - Cardiology Stepdown  Heart Transplant  Progress Note    Patient Name: Audrey Oneil Jr.  MRN: [id number]  Admission Date: 9/14/2022  Hospital Length of Stay: 4 days  Attending Physician: Migue Henry Jr.,*  Primary Care Provider: Primary Doctor No  Principal Problem:Chronic combined systolic and diastolic congestive heart failure    Subjective:     Interval History:   No overnight acute events were reported. Patient would require about 100 cc of IV contrast for CT-pancreatic protocol (based on weight) which in view of sepsis requiring vancomycin and concern for nephrotoxicity will hold on pursuing imaging while inpatient. Patient remains afebrile without leukocytosis, prior cultures still positive for MRSA. Will follow up repeat obtained today. Patient remains in contact precautions for MRSA septecemia. HARRY is recommended but given patients clinical condition would prefer to defer at this time unless persistent bacteremic after antibiotic course. Will recheck cultures today    Continuous Infusions:   DOBUTamine IV infusion (non-titrating) 2.5 mcg/kg/min (09/17/22 1451)     Scheduled Meds:   acetaminophen  650 mg Oral QID    allopurinoL  200 mg Oral Daily    amiodarone  300 mg Oral Daily    aspirin  81 mg Oral Daily    atorvastatin  80 mg Oral Daily    enoxaparin  40 mg Subcutaneous Daily    LIDOcaine  1 patch Transdermal Q24H    mupirocin   Nasal BID    sacubitriL-valsartan  1 tablet Oral BID    vancomycin (VANCOCIN) IVPB  1,250 mg Intravenous Q24H     PRN Meds:HYDROcodone-acetaminophen, methocarbamoL, ondansetron, senna-docusate 8.6-50 mg, sodium chloride 0.9%, Pharmacy to dose Vancomycin consult **AND** vancomycin - pharmacy to dose    Review of patient's allergies indicates:   Allergen Reactions    Iodine containing multivitamin Swelling     itching    Keflex [cephalexin] Swelling     Eyes.  Tolerated multiple doses of zosyn and 1 dose of augmentin in 2015 and 2016, respectively    Peaches [peach  (prunus persica)] Swelling     eyes    Shellfish containing products Swelling    Fig tree Swelling     itching    Tuberculin spenser test ppd Rash     Objective:     Vital Signs (Most Recent):  Temp: 96.7 °F (35.9 °C) (09/18/22 0459)  Pulse: 84 (09/18/22 0458)  Resp: 18 (09/18/22 0606)  BP: (!) 117/56 (09/18/22 0458)  SpO2: 100 % (09/18/22 0458)   Vital Signs (24h Range):  Temp:  [96.7 °F (35.9 °C)-98.4 °F (36.9 °C)] 96.7 °F (35.9 °C)  Pulse:  [78-84] 84  Resp:  [16-18] 18  SpO2:  [93 %-100 %] 100 %  BP: ()/(47-56) 117/56     Patient Vitals for the past 72 hrs (Last 3 readings):   Weight   09/18/22 0458 96.8 kg (213 lb 6.5 oz)   09/17/22 0700 97.4 kg (214 lb 11.7 oz)   09/15/22 0840 98 kg (216 lb)       Body mass index is 33.42 kg/m².      Intake/Output Summary (Last 24 hours) at 9/18/2022 0620  Last data filed at 9/18/2022 0400  Gross per 24 hour   Intake 720 ml   Output 1125 ml   Net -405 ml         Physical Exam  Constitutional:       Appearance: Normal appearance. He is obese.   HENT:      Head: Atraumatic.   Eyes:      Conjunctiva/sclera: Conjunctivae normal.   Cardiovascular:      Rate and Rhythm: Normal rate and regular rhythm.   Pulmonary:      Effort: Pulmonary effort is normal.      Breath sounds: No wheezing or rhonchi.   Abdominal:      General: There is distension.      Palpations: Abdomen is soft. There is no mass.      Tenderness: There is no abdominal tenderness.   Musculoskeletal:      Right lower leg: No edema.      Left lower leg: No edema.   Skin:     General: Skin is warm.      Comments: Right arm, IV line infiltrated, erythematous; multiple bruising in bilateral arms   Neurological:      Mental Status: He is alert.       Significant Labs:  CBC:  Recent Labs   Lab 09/15/22  0320 09/16/22  0443 09/17/22  0346   WBC 9.29 5.87 4.05   RBC 4.09* 3.90* 3.82*   HGB 11.5* 10.9* 10.5*   HCT 36.1* 33.8* 32.0*   * 114* 120*   MCV 88 87 84   MCH 28.1 27.9 27.5   MCHC 31.9* 32.2 32.8        BNP:  Recent Labs   Lab 09/12/22  1805 09/14/22  0537   * 335*       CMP:  Recent Labs   Lab 09/15/22  0320 09/15/22  1502 09/16/22  0443 09/17/22  0346   * 123* 111* 98   CALCIUM 8.7 8.2* 8.7 8.6*   ALBUMIN 3.4*  --  3.1* 2.8*   PROT 6.4  --  6.3 6.1    132* 136 135*   K 4.7 4.1 4.1 4.0   CO2 22* 25 24 23    104 103 101   BUN 40* 40* 42* 46*   CREATININE 2.4* 2.2* 1.7* 1.7*   ALKPHOS 51*  --  49* 53*   ALT 23  --  25 20   AST 30  --  27 22   BILITOT 0.8  --  0.8 0.7        Coagulation:   No results for input(s): PT, INR, APTT in the last 168 hours.  LDH:  No results for input(s): LDH in the last 72 hours.    Microbiology:  Microbiology Results (last 7 days)       Procedure Component Value Units Date/Time    Blood culture [[id number]]  (Abnormal) Collected: 09/14/22 1259    Order Status: Completed Specimen: Blood from Peripheral, Antecubital, Left Updated: 09/17/22 1005     Blood Culture, Routine Gram stain robret bottle: Gram positive cocci in clusters resembling Staph      Results called to and read back by: Gilbert Castle RN,  09/15/2022  00:50      Gram stain aer bottle: Gram positive cocci in clusters resembling Staph      Positive results previously called 09/15/2022  03:06      METHICILLIN RESISTANT STAPHYLOCOCCUS AUREUS  ID consult required at [facility]  For susceptibility see order #[id number]      Blood culture [[id number]]  (Abnormal)  (Susceptibility) Collected: 09/14/22 1302    Order Status: Completed Specimen: Blood from Wrist, Left Updated: 09/17/22 1004     Blood Culture, Routine Gram stain robert bottle: Gram positive cocci      Positive results previously called 09/15/2022  02:59      METHICILLIN RESISTANT STAPHYLOCOCCUS AUREUS  [facility]      Blood culture [[id number]]  (Abnormal) Collected: 09/16/22 0443    Order Status: Completed Specimen: Blood Updated: 09/17/22 0919     Blood Culture,  Routine Gram stain aer bottle: Gram positive cocci in clusters resembling Staph      Results called to and read back by:Estrellita Eric RN 09/16/2022  22:55      METHICILLIN RESISTANT STAPHYLOCOCCUS AUREUS  ID consult required at [facility] [place].  For susceptibility see order #[id number]      Narrative:      Rt hand    Blood culture [[id number]]  (Abnormal) Collected: 09/15/22 1142    Order Status: Completed Specimen: Blood Updated: 09/17/22 0916     Blood Culture, Routine Gram stain aer bottle: Gram positive cocci in clusters resembling Staph      Positive results previously called 09/16/2022  05:41      METHICILLIN RESISTANT STAPHYLOCOCCUS AUREUS  ID consult required at [facility] [place].  For susceptibility see order #[id number]      Narrative:      Rt AC    Blood culture [[id number]]  (Abnormal) Collected: 09/15/22 1142    Order Status: Completed Specimen: Blood Updated: 09/17/22 0915     Blood Culture, Routine Gram stain aer bottle: Gram positive cocci in clusters resembling Staph      Results called to and read back by:Estrellita Eric RN  09/16/2022  05:03      METHICILLIN RESISTANT STAPHYLOCOCCUS AUREUS  ID consult required at [facility] [place].  For susceptibility see order #[id number]      Narrative:      Rt wrist    Blood culture [[id number]] Collected: 09/16/22 0443    Order Status: Completed Specimen: Blood Updated: 09/17/22 0535     Blood Culture, Routine Gram stain aer bottle: Gram positive cocci in clusters resembling Staph      Positive results previously called 09/17/2022  05:34    Narrative:      Lft wrist    Blood culture [[id number]]     Order Status: Canceled Specimen: Blood     Blood culture [[id number]]     Order Status: Canceled Specimen: Blood     MRSA/SA Rapid ID by PCR from Blood culture [[id number]]  (Abnormal) Collected: 09/15/22 0050    Order Status: Completed Updated: 09/15/22 0156     Staph aureus ID by PCR Positive      MRSA ID by PCR Positive    Influenza A & B by Molecular [[id number]] Collected: 09/14/22 0632    Order Status: Completed Specimen: Nasopharyngeal Swab Updated: 09/14/22 0725     Influenza A, Molecular Negative     Influenza B, Molecular Negative     Flu A & B Source Nasal swab            Estimated Creatinine Clearance: 44.8 mL/min (A) (based on SCr of 1.7 mg/dL (H)).    Diagnostic Results:      Assessment and Plan:   72 year old male with hx of HFrEF (EF 15%, S/p CRT-D, on chronic  2.5), CAD (s/p multiple remote PCI, reported non-obstructive [facility] 1/2022), Vtach, HTN, HLD, and CKD who presented with 1 day upper abdominal pain and SOB found to have decompensated HF with progressive hypoxic respiratory failure while in the ED initiated on IV lasix and BiPAP.  Patient was found to be bacteremic with MRSA, suspect PICC related    * Chronic combined systolic and diastolic congestive heart failure  - Dobutamine 2.5 mcg/kg/min  - Continue with Entresto 24-26, mg BID  - Holding: Spironolactone 25 mg, daily, Jardiance and was previously on Entresto 49-51 mg, BID  - Lasix held avoid over-diuresing  - Daily weights, Strict I/Os  - Monitor electrolytes and Maintain Mg >2 and K >4  -Telemetry monitoring    Bacteremia due to methicillin resistant Staphylococcus aureus  - Monitor WBC count and fever curve, pan-culture if patient spikes  - ID consult and recommendations are appreciated  - Vancomycin 1,250, daily; Monitor level and renal panel  - Repeat blood cultures positive; will repeat cultures today (9/18) as PICC line was recently removed and patient previously sub-therapeutic on vancomycin  - Will avoid HARRY at this time, if patient has persistent bacteremia will re-consider. Understand after discussions with ID that antibiotic course may require a prolonged course even after IV therapy if either device is infected vs patient having infective endocarditis    Coronary artery disease involving native coronary artery of native  heart without angina pectoris  - Asprin 81 mg, daily  - Atorvastatin 80 mg, nightly    H/O ventricular tachycardia  - Amiodarone 300 mg, daily  - Monitor LFTs    Pancreatic lesion  - Gastroenterology consult and recommendations appreciated  - Unable to obtain MRI for further evaluation given CRT-D  - Will hold off on obtaining CT with pancreatic protocol at this time in view of active infection requiring vancomycin and risk of contrast-induced nephrotoxicity in this patient      Desirae Meyers MD  Heart Transplant  [facility] - Cardiology Stepdown

## 2022-09-18 NOTE — TREATMENT PLAN
[facility] Treatment Plan    Audrey Oneil Jr. is a 72 y.o. male admitted to hospital 9/14/2022 (Hospital Day: 5) due to Chronic combined systolic and diastolic congestive heart failure.     Interval History  No acute events documented overnight. Vital signs stable on nasal cannula. Labs notable for stable normocytic anemia (Hgb 10-11) and improving LAVERNE (Cr 1.4). LFT's normal. Blood cultures from 09/16 remain positive for MRSA; repeats from today in process. Anticipated plan to obtain CT pancreas protocol once completed course of Vancomycin for MRSA bacteremia.     Objective  Temp:  [96.7 °F (35.9 °C)-98.2 °F (36.8 °C)] 97.5 °F (36.4 °C) (09/18 0804)  Pulse:  [73-84] 73 (09/18 1155)  BP: ()/(47-60) 113/58 (09/18 1155)  Resp:  [16-18] 16 (09/18 1155)  SpO2:  [92 %-100 %] 92 % (09/18 1155)    Laboratory  Lab Results   Component Value Date    WBC 4.65 09/18/2022    HGB 11.2 (L) 09/18/2022    HCT 34.4 (L) 09/18/2022    MCV 87 09/18/2022     (L) 09/18/2022       Lab Results   Component Value Date    ALT 30 09/18/2022    AST 36 09/18/2022    ALKPHOS 67 09/18/2022    BILITOT 0.7 09/18/2022       Recommendations  - Agree with optimizing kidney function prior to proceeding with repeat CT with pancreas protocol.   - Please notify [facility] once results obtained.     Thank you for involving us in the care of Audrey Oneil Jr.. Please call with any additional questions, concerns or changes in the patient's clinical status.        Cholo Newman MD, PGY-V  Gastroenterology Fellow  [facility]

## 2022-09-18 NOTE — ASSESSMENT & PLAN NOTE
Erythema, edema, tenderness and warmth of the left forearm. Patient reports this was a prior IV site.   · Continue vancomycin.   · Ice and elevation.   · Consider ultrasounds to assess for phlebitis.   · If unable to get therapeutic vancomycin levels and ongoing concern for vancomycin related adverse events then will consider transitioning to other antistaphylococcal agent.

## 2022-09-19 NOTE — SUBJECTIVE & OBJECTIVE
Interval History: Patient developed erythematous painful rash at area of previous IV-site at right forearm (09/18), possible cellulitis vs. Phlebitis. Fever resolved 09/18,  afebrile since, and without leukocytosis. Night sweats resolved, and some improvement in appetite.   CrCl: 54mL/min (stable, slightly improved from previous few days inpatient)   Patient continuing on IV-Vanc; Vanc trough (09/19) 14.3.    Review of Systems   Constitutional:  Positive for appetite change and fatigue. Negative for chills, diaphoresis and fever.   Respiratory:  Positive for shortness of breath.    Cardiovascular: Negative.    Gastrointestinal:  Positive for abdominal distention, abdominal pain and nausea. Negative for constipation and vomiting.   Genitourinary: Negative.    Musculoskeletal:  Positive for myalgias.   Skin:  Positive for rash.        Right forearm, painful, erythematous rash   Neurological:  Negative for headaches.   Psychiatric/Behavioral:  Negative for confusion.    Objective:     Vital Signs (Most Recent):  Temp: 97.2 °F (36.2 °C) (09/19/22 1556)  Pulse: 81 (09/19/22 1556)  Resp: 16 (09/19/22 1734)  BP: 137/62 (09/19/22 1556)  SpO2: 98 % (09/19/22 1556) Vital Signs (24h Range):  Temp:  [96.4 °F (35.8 °C)-97.8 °F (36.6 °C)] 97.2 °F (36.2 °C)  Pulse:  [72-81] 81  Resp:  [14-20] 16  SpO2:  [93 %-98 %] 98 %  BP: ()/(54-62) 137/62     Weight: 96.7 kg (213 lb 3 oz)  Body mass index is 33.39 kg/m².    Estimated Creatinine Clearance: 54.4 mL/min (based on SCr of 1.4 mg/dL).    Physical Exam    Significant Labs: All pertinent labs within the past 24 hours have been reviewed.    Significant Imaging: I have reviewed all pertinent imaging results/findings within the past 24 hours.

## 2022-09-19 NOTE — NURSING
Notified Dr. Li of pt low BP, was instructed to still give valsartan and that it is ok to give pt a pain pill also.

## 2022-09-19 NOTE — SUBJECTIVE & OBJECTIVE
Interval History:  Complains of generalized body aches.  Cultures from 18th no growth so far.  Currently on vancomycin, dobutamine,, Entresto  Continuous Infusions:   DOBUTamine IV infusion (non-titrating) 2.5 mcg/kg/min (09/19/22 0043)     Scheduled Meds:   acetaminophen  650 mg Oral QID    allopurinoL  200 mg Oral Daily    amiodarone  300 mg Oral Daily    aspirin  81 mg Oral Daily    atorvastatin  80 mg Oral Daily    enoxaparin  40 mg Subcutaneous Daily    LIDOcaine  1 patch Transdermal Q24H    sacubitriL-valsartan  1 tablet Oral BID    vancomycin (VANCOCIN) IVPB  1,250 mg Intravenous Q24H     PRN Meds:HYDROcodone-acetaminophen, methocarbamoL, ondansetron, senna-docusate 8.6-50 mg, sodium chloride 0.9%, Pharmacy to dose Vancomycin consult **AND** vancomycin - pharmacy to dose    Review of patient's allergies indicates:   Allergen Reactions    Iodine containing multivitamin Swelling     itching    Keflex [cephalexin] Swelling     Eyes.  Tolerated multiple doses of zosyn and 1 dose of augmentin in 2015 and 2016, respectively    Peaches [peach (prunus persica)] Swelling     eyes    Shellfish containing products Swelling    Fig tree Swelling     itching    Tuberculin spenser test ppd Rash     Objective:     Vital Signs (Most Recent):  Temp: 96.4 °F (35.8 °C) (09/19/22 1221)  Pulse: 73 (09/19/22 1221)  Resp: 20 (09/19/22 1221)  BP: (!) 118/55 (09/19/22 1221)  SpO2: (!) 94 % (09/19/22 1221)   Vital Signs (24h Range):  Temp:  [96.4 °F (35.8 °C)-97.8 °F (36.6 °C)] 96.4 °F (35.8 °C)  Pulse:  [72-80] 73  Resp:  [14-20] 20  SpO2:  [93 %-98 %] 94 %  BP: ()/(53-59) 118/55     Patient Vitals for the past 72 hrs (Last 3 readings):   Weight   09/19/22 0448 96.7 kg (213 lb 3 oz)   09/18/22 0458 96.8 kg (213 lb 6.5 oz)   09/17/22 0700 97.4 kg (214 lb 11.7 oz)     Body mass index is 33.39 kg/m².      Intake/Output Summary (Last 24 hours) at 9/19/2022 1509  Last data filed at 9/19/2022 1300  Gross per 24 hour   Intake 960 ml    Output 1850 ml   Net -890 ml       Hemodynamic Parameters:       Telemetry: NSR    Physical Exam  Constitutional:       Appearance: Normal appearance. He is obese.   HENT:      Head: Atraumatic.   Eyes:      Conjunctiva/sclera: Conjunctivae normal.   Cardiovascular:      Rate and Rhythm: Normal rate and regular rhythm.   Pulmonary:      Effort: Pulmonary effort is normal.      Breath sounds: No wheezing or rhonchi.   Abdominal:      General: There is distension.      Palpations: Abdomen is soft. There is no mass.      Tenderness: There is no abdominal tenderness.   Musculoskeletal:      Right lower leg: No edema.      Left lower leg: No edema.   Skin:     General: Skin is warm.      Comments: Right arm, IV line infiltrated, erythematous; multiple bruising in bilateral arms   Neurological:      Mental Status: He is alert  Significant Labs:  CBC:  Recent Labs   Lab 09/17/22 0346 09/18/22 0728 09/19/22 0316   WBC 4.05 4.65 4.31   RBC 3.82* 3.94* 4.01*   HGB 10.5* 11.2* 11.1*   HCT 32.0* 34.4* 33.8*   * 142* 168   MCV 84 87 84   MCH 27.5 28.4 27.7   MCHC 32.8 32.6 32.8     BNP:  Recent Labs   Lab 09/12/22  1805 09/14/22  0537   * 335*     CMP:  Recent Labs   Lab 09/17/22 0346 09/18/22 0728 09/19/22 0316   GLU 98 101 111*   CALCIUM 8.6* 8.4* 8.3*   ALBUMIN 2.8* 2.7* 2.7*   PROT 6.1 6.0 5.8*   * 134* 135*   K 4.0 4.0 3.9   CO2 23 25 24    103 103   BUN 46* 44* 40*   CREATININE 1.7* 1.4 1.4   ALKPHOS 53* 67 79   ALT 20 30 27   AST 22 36 29   BILITOT 0.7 0.7 0.4      Coagulation:   No results for input(s): PT, INR, APTT in the last 168 hours.  LDH:  No results for input(s): LDH in the last 72 hours.  Microbiology:  Microbiology Results (last 7 days)       Procedure Component Value Units Date/Time    Blood culture [[id number]] Collected: 09/18/22 1117    Order Status: Completed Specimen: Blood from Peripheral, Right Hand Updated: 09/19/22 1412     Blood Culture, Routine No Growth to date       No Growth to date    Blood culture [[id number]] Collected: 09/18/22 1105    Order Status: Completed Specimen: Blood from Antecubital, Right Arm Updated: 09/19/22 1212     Blood Culture, Routine No Growth to date      No Growth to date    Blood culture [[id number]]  (Abnormal) Collected: 09/16/22 0443    Order Status: Completed Specimen: Blood Updated: 09/19/22 0951     Blood Culture, Routine Gram stain aer bottle: Gram positive cocci in clusters resembling Staph      Positive results previously called 09/17/2022  05:34      METHICILLIN RESISTANT STAPHYLOCOCCUS AUREUS  ID consult required at [facility].  For susceptibility see order #[id number]      Narrative:      Lft wrist    Blood culture [[id number]]  (Abnormal)  (Susceptibility) Collected: 09/14/22 1302    Order Status: Completed Specimen: Blood from Wrist, Left Updated: 09/19/22 0734     Blood Culture, Routine Gram stain robert bottle: Gram positive cocci      Positive results previously called 09/15/2022  02:59      METHICILLIN RESISTANT STAPHYLOCOCCUS AUREUS  ID consult required at [facility].       Comment: Previous comment was modified by KRISTINA at 09:12 on 09/17/2022 ID   consult required at [facility].         Blood culture [[id number]]  (Abnormal) Collected: 09/16/22 0443    Order Status: Completed Specimen: Blood Updated: 09/18/22 0748     Blood Culture, Routine Gram stain aer bottle: Gram positive cocci in clusters resembling Staph      Results called to and read back by:Estrellita Eric RN 09/16/2022  22:55      METHICILLIN RESISTANT STAPHYLOCOCCUS AUREUS  ID consult required at [facility].  For susceptibility see order #[id number]      Narrative:      Rt hand    Blood culture [[id number]]  (Abnormal) Collected: 09/15/22 1142    Order Status: Completed Specimen: Blood Updated: 09/18/22 0748     Blood Culture, Routine Gram stain aer bottle: Gram  positive cocci in clusters resembling Staph      Positive results previously called 09/16/2022  05:41      METHICILLIN RESISTANT STAPHYLOCOCCUS AUREUS  ID consult required at [facility].  For susceptibility see order #[id number]      Narrative:      Rt AC    Blood culture [[id number]]  (Abnormal) Collected: 09/15/22 1142    Order Status: Completed Specimen: Blood Updated: 09/18/22 0747     Blood Culture, Routine Gram stain aer bottle: Gram positive cocci in clusters resembling Staph      Results called to and read back by:Estrellita Eric RN  09/16/2022  05:03      METHICILLIN RESISTANT STAPHYLOCOCCUS AUREUS  ID consult required at [facility].  For susceptibility see order #[id number]      Narrative:      Rt wrist    Blood culture [[id number]]  (Abnormal) Collected: 09/14/22 1259    Order Status: Completed Specimen: Blood from Peripheral, Antecubital, Left Updated: 09/17/22 1005     Blood Culture, Routine Gram stain robert bottle: Gram positive cocci in clusters resembling Staph      Results called to and read back by: Gilbert Castle RN,  09/15/2022  00:50      Gram stain aer bottle: Gram positive cocci in clusters resembling Staph      Positive results previously called 09/15/2022  03:06      METHICILLIN RESISTANT STAPHYLOCOCCUS AUREUS  ID consult required at [facility].  For susceptibility see order #[id number]      Blood culture [[id number]]     Order Status: Canceled Specimen: Blood     Blood culture [[id number]]     Order Status: Canceled Specimen: Blood     MRSA/SA Rapid ID by PCR from Blood culture [[id number]]  (Abnormal) Collected: 09/15/22 0050    Order Status: Completed Updated: 09/15/22 0156     Staph aureus ID by PCR Positive     MRSA ID by PCR Positive    Influenza A & B by Molecular [[id number]] Collected: 09/14/22 0632    Order Status: Completed Specimen: Nasopharyngeal Swab Updated: 09/14/22 0725     Influenza A, Molecular  Negative     Influenza B, Molecular Negative     Flu A & B Source Nasal swab            I have reviewed all pertinent labs within the past 24 hours.    Estimated Creatinine Clearance: 54.4 mL/min (based on SCr of 1.4 mg/dL).    Diagnostic Results:  I have reviewed and interpreted all pertinent imaging results/findings within the past 24 hours.

## 2022-09-19 NOTE — CONSULTS
CARDIAC ELECTROPHYSIOLOGY CONSULTATION NOTE    PATIENT: Audrey Oneil Jr.  MRN: [id number]  : 1950  DATE OF SERVICE: 2022    REFERRING PRACTITIONER: Self, Aaareferral  PRIMARY CARE PROVIDER: Primary Doctor No  ATTENDING PHYSICIAN: Sree Perez MD  ELECTROPHYSIOLOGIST: Dr. Gama    Reason for Consultation:   Patient with MRSA bacteremia. EP consulted for device extraction     Problem List:   72 y.o.male with:    1. 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 Teofilo Moore , and upgrade to a [employer]ri CRT-D in 2019 by Dr. Teofilo Moore.   2. H/O ventricular tachycardia on amiodarone 300 mg daily  3. LBBB  4. CAD s/p STEMI s/p PCI LAD   5. provoked PE/DVT in   6. Former smoker    HPI:     Dear Stephanie Lino,  I had the pleasure of seeing Audrey Oneil Jr. in the Cardiac Electrophysiology service. As you know, this is a 72 y.o.male with PMH as above who was last seen in EP clinic on 3/21/22 by Dr. Gama. His device was interrogated on 3/10/2022, which showed a stable device and lead function. He is 99% biv paced. No arrhythmias were identified. Three episodes of nonsustained RV oversensing on 3/8 and 3/9. Estimated battery longevity is 5.6-6 years. Before Dr. Gama, he used to f/u with Dr. Taylor Armijo.     [facility] is also following him; He has been worked up for LVAD since hospitalization in 2022, where he was admitted for recurrent VT (he thinks he had MI) and cardiogenic shock at [facility]. He was later seen in [facility], where he was treated for cardiogenic shock and sent home on .  He remains on  and is pursuing evaluation for LVAD, currently awaiting evaluation of pancreatic mass.    The patient is currently admitted for cellulitis of the left forearm and septicemia due to methicillin-resistant Staphylococcus aureus on Vanco. Blood cultures - remain positive for MRSA; EP was consulted for device extraction.     WBC   4.3  HB 11  HCT 33.8  INR 1.0     Current EP meds: amiodarone 300 mg daily, dobutamine 2.5 mcg/kg/min  Others: ASA 81 mg daily and enoxaparin 40 qd.    Active Problems:     Patient Active Problem List   Diagnosis    Coronary artery disease involving native coronary artery of native heart without angina pectoris    Chronic combined systolic and diastolic heart failure    Obesity (BMI 30.0-34.9)    Cardiomyopathy, ischemic    Hx pulmonary embolism 2010 provoked dvt     Postural dizziness with presyncope    History of DVT (deep vein thrombosis)    Ventricular tachycardia    Hypertensive cardiovascular-renal disease, stage 1-4 or unspecified chronic kidney disease, with heart failure    Presence of cardiac resynchronization therapy defibrillator (CRT-D)    Mixed hyperlipidemia    Long term current use of amiodarone    Thoracic aortic atherosclerosis    Stage 3a chronic kidney disease    Prediabetes    LBBB (left bundle branch block)    Elevated troponin I level    Gout    Chronic combined systolic and diastolic congestive heart failure    H/O ventricular tachycardia    Vitamin D deficiency    Blue skin    MRSA infection    Rotator cuff syndrome    Severe sepsis    Pressure injury of heel, stage 2    Dermatitis associated with moisture    Skin breakdown    Leukocytosis    Bacteremia due to methicillin resistant Staphylococcus aureus    Septicemia due to methicillin resistant Staphylococcus aureus    Pancreatic lesion    Cellulitis of left forearm       Past Medical History:     Past Medical History:   Diagnosis Date    Acute hypoxemic respiratory failure 11/7/2015    Acute idiopathic gout of left knee 12/2/2019    Acute idiopathic gout of right elbow 9/23/2021    AICD (automatic cardioverter/defibrillator) present 12/13/2015      Anticoagulant long-term use     Bronchopneumonia 12/20/2021    CHF (congestive heart failure)     Chronic anticoagulation 5/5/2016    Chronic combined systolic and diastolic heart  failure 11/26/2012    EF 10-20% on ECHO 2013    Chronic gout 12/2/2019    Clotting disorder     Coronary artery disease involving native coronary artery of native heart without angina pectoris 11/26/2012    Cath 10- Stents patent non-obstructive disease Cath 11-12015 non-obstructive disease     COVID-19 08/2021    Had antibody infusion    Diverticulosis of colon     DVT (deep venous thrombosis), unspecified laterality 11/12/2015    Dysphonia 1/28/2018    Essential hypertension 11/15/2015    Fine motor impairment 2/2/2021    Hyperlipidemia     Hypertensive heart disease with heart failure 5/5/2016    MI (myocardial infarction) 2009    x's 5    Nicotine abuse     Obesity 11/26/2012    Olecranon bursitis of right elbow 9/19/2021    Pulmonary embolism 2011    Renal disorder     LAVERNE    Right carpal tunnel syndrome 4/6/2018    Stented coronary artery 11/26/2012    LAD stent placed 10/17/2007     Trigger thumb of right hand 4/6/2018       Past Surgical History:     Past Surgical History:   Procedure Laterality Date    APPENDECTOMY      BACK SURGERY      CARDIAC DEFIBRILLATOR PLACEMENT      CARDIAC SURGERY  2007    stent    CARPAL TUNNEL RELEASE Right 04/2018    COLONOSCOPY N/A 8/8/2022    Procedure: COLONOSCOPY;  Surgeon: Sascha Keenan MD;  Location: [facility] ENDO ([address] [place] [address]);  Service: Endoscopy;  Laterality: N/A;  EF-15%  pre heart    AICD-[employer]ri       COVID test [facility] 8/5-inst mail-am prep-clears 4 hrs prior-tb    INSERTION OF BIVENTRICULAR IMPLANTABLE CARDIOVERTER-DEFIBRILLATOR (ICD) N/A 5/3/2019    Procedure: INSERTION, ICD, BIVENTRICULAR;  Surgeon: Teofilo Pal MD;  Location: [facility] EP LAB;  Service: Cardiology;  Laterality: N/A;  ICH CM,  ICD UPGD BI-V,  SJM, MAC, FAS, 3PREP (dual ICD INSITU)    r knee scope      REVISION OF SKIN POCKET FOR CARDIOVERTER-DEFIBRILLATOR Left 5/3/2019    Procedure: Revision, Skin Pocket, For Cardioverter-Defibrillator;  Surgeon: Teofilo Pal MD;  Location: [facility] EP  LAB;  Service: Cardiology;  Laterality: Left;    RIGHT HEART CATHETERIZATION Right 2021    Procedure: INSERTION, CATHETER, RIGHT HEART;  Surgeon: Lizz Nieto MD;  Location: [facility] CATH LAB;  Service: Cardiology;  Laterality: Right;    RIGHT HEART CATHETERIZATION Right 2022    Procedure: INSERTION, CATHETER, RIGHT HEART;  Surgeon: Migue Henry Jr., MD;  Location: [facility] CATH LAB;  Service: Cardiology;  Laterality: Right;    SPINE SURGERY      TONSILLECTOMY      TRIGGER FINGER RELEASE Right 2018    thumb       Social History/Family History:     Social History     Socioeconomic History    Marital status:     Number of children: 2   Occupational History    Occupation: [employer]     Comment: unemployed   Tobacco Use    Smoking status: Former     Packs/day: 1.00     Years: 45.00     Pack years: 45.00     Types: Cigarettes     Quit date: 2005     Years since quittin.7    Smokeless tobacco: Never    Tobacco comments:     1-1.5 ppd every day.   Substance and Sexual Activity    Alcohol use: No    Drug use: No    Sexual activity: Not Currently     Family History   Problem Relation Age of Onset    Cancer Mother         colon cancer    Heart disease Mother     Diabetes Mother     Heart disease Father     Stroke Father     Colon polyps Father     Cancer Paternal Grandmother         leukemia    No Known Problems Sister     No Known Problems Daughter     No Known Problems Son     No Known Problems Sister     No Known Problems Son        Medications:     Medications Prior to Admission   Medication Sig Dispense Refill Last Dose    allopurinoL (ZYLOPRIM) 100 MG tablet Take 2 tablets (200 mg total) by mouth once daily. 60 tablet 0     amiodarone (PACERONE) 200 MG Tab TAKE 1 AND 1/2 TABLETS(300 MG) BY MOUTH EVERY  tablet 3     aspirin (ECOTRIN) 81 MG EC tablet Take 1 tablet (81 mg total) by mouth once daily. 90 tablet 3     atorvastatin (LIPITOR) 80 MG tablet Take 1 tablet (80 mg total) by  "mouth once daily. 90 tablet 1     DOBUTamine (DOBUTREX) 500 mg/250 mL (2,000 mcg/mL) 2.5 mcg/kg/min  Patient is 95.7 kg 250 mL 5     furosemide (LASIX) 40 MG tablet Take 1 tablet (40 mg total) by mouth daily as needed (Weight gain of 3 lbs in one day or 5 lbs in one week). 30 tablet 11     HYDROcodone-acetaminophen (NORCO)  mg per tablet Take 1 tablet by mouth every 6 (six) hours.       JARDIANCE 25 mg tablet Take 1 tablet (25 mg total) by mouth once daily. 30 tablet 5     ondansetron (ZOFRAN-ODT) 4 MG TbDL Dissolve 1 tablet (4 mg total) by mouth every 6 (six) hours as needed (nausea). 30 tablet 1     polyethylene glycol (GOLYTELY) 236-22.74-6.74 -5.86 gram suspension SMARTSIG:Milliliter(s) By Mouth       sacubitriL-valsartan (ENTRESTO) 49-51 mg per tablet Take 1 tablet by mouth 2 (two) times daily. 60 tablet 3     simethicone (MYLICON) 80 MG chewable tablet Take 1 tablet (80 mg total) by mouth every 6 (six) hours as needed for Flatulence. 60 tablet 1     spironolactone (ALDACTONE) 25 MG tablet Take 1 tablet (25 mg total) by mouth once daily. 30 tablet 3        Allergies:     Review of patient's allergies indicates:   Allergen Reactions    Iodine containing multivitamin Swelling     itching    Keflex [cephalexin] Swelling     Eyes.  Tolerated multiple doses of zosyn and 1 dose of augmentin in 2015 and 2016, respectively    Peaches [peach (prunus persica)] Swelling     eyes    Shellfish containing products Swelling    Fig tree Swelling     itching    Tuberculin spenser test ppd Rash       Review of Systems:   Review of Systems   All other systems reviewed and are negative.     Physical Exam:     VITALS: BP (!) 118/55 (BP Location: Right arm, Patient Position: Lying)   Pulse 73   Temp 96.4 °F (35.8 °C) (Oral)   Resp 20   Ht 5' 7" (1.702 m)   Wt 96.7 kg (213 lb 3 oz)   SpO2 (!) 94%   BMI 33.39 kg/m²        Eyes: Sclerae anicteric. Ears/nose/throat: Mucous membranes moist. Neck: No thyromegaly, " lymphadenopathy, or jugular venous distention. Respiratory: Lungs clear to auscultation bilaterally. Cardiovascular: Heart was regular with normal first and second heart sounds. No S3 or S4. GI: Soft, nontender, nondistended, with normal bowel sounds. Musculoskeletal: extremities without cyanosis, clubbing or pitting edema. Neurologic: Patient is alert and oriented x3 and there is no focal strength deficit. Skin: no rashes, cuts, sores. Psychiatric: normal affect. Hematologic: no abnormal bruising or bleeding.    Laboratory:     9/16/22 Blood culture: MRSA     Diagnostic Studies:     EKG 9/14/22: AF RVR      Plan:     HARRY   Patient with no history of CABG or sternal incision, will need CTS back-up for this case.   Will plan for extraction depending on HARRY results.  Can consult them after HARRY results tomorrow  Continue antibiotics per ID emily Macedo MD  [facility]  PGY 4 Cardiology Fellow

## 2022-09-19 NOTE — ASSESSMENT & PLAN NOTE
- Monitor WBC count and fever curve, pan-culture if patient spikes  - ID consult and recommendations are appreciated  - Vancomycin 1,250, daily; Monitor level and renal panel  -patient has persistent positive blood cultures.  Cultures from 18 no growth so far.  His currently on vancomycin.  Id on board.  - consult EP for ICD removal.

## 2022-09-19 NOTE — PROGRESS NOTES
Inpatient device interrogation and/or reprogramming orders received; the industry representative was contacted and provided with patient's name and room number.

## 2022-09-19 NOTE — PROGRESS NOTES
[facility] - [facility]iology Stepdown  Heart Transplant  Progress Note    Patient Name: Audrey Oneil Jr.  MRN: [id number]  Admission Date: 9/14/2022  Hospital Length of Stay: 5 days  Attending Physician: Sree Perez MD  Primary Care Provider: Primary Doctor No  Principal Problem:Chronic combined systolic and diastolic congestive heart failure    Subjective:     Interval History:  Complains of generalized body aches.  Cultures from 18th no growth so far.  Currently on vancomycin, dobutamine,, Entresto  Continuous Infusions:   DOBUTamine IV infusion (non-titrating) 2.5 mcg/kg/min (09/19/22 0043)     Scheduled Meds:   acetaminophen  650 mg Oral QID    allopurinoL  200 mg Oral Daily    amiodarone  300 mg Oral Daily    aspirin  81 mg Oral Daily    atorvastatin  80 mg Oral Daily    enoxaparin  40 mg Subcutaneous Daily    LIDOcaine  1 patch Transdermal Q24H    sacubitriL-valsartan  1 tablet Oral BID    vancomycin (VANCOCIN) IVPB  1,250 mg Intravenous Q24H     PRN Meds:HYDROcodone-acetaminophen, methocarbamoL, ondansetron, senna-docusate 8.6-50 mg, sodium chloride 0.9%, Pharmacy to dose Vancomycin consult **AND** vancomycin - pharmacy to dose    Review of patient's allergies indicates:   Allergen Reactions    Iodine containing multivitamin Swelling     itching    Keflex [cephalexin] Swelling     Eyes.  Tolerated multiple doses of zosyn and 1 dose of augmentin in 2015 and 2016, respectively    Peaches [peach (prunus persica)] Swelling     eyes    Shellfish containing products Swelling    Fig tree Swelling     itching    Tuberculin spenser test ppd Rash     Objective:     Vital Signs (Most Recent):  Temp: 96.4 °F (35.8 °C) (09/19/22 1221)  Pulse: 73 (09/19/22 1221)  Resp: 20 (09/19/22 1221)  BP: (!) 118/55 (09/19/22 1221)  SpO2: (!) 94 % (09/19/22 1221)   Vital Signs (24h Range):  Temp:  [96.4 °F (35.8 °C)-97.8 °F (36.6 °C)] 96.4 °F (35.8 °C)  Pulse:  [72-80] 73  Resp:  [14-20] 20  SpO2:  [93 %-98 %] 94 %  BP:  ()/(53-59) 118/55     Patient Vitals for the past 72 hrs (Last 3 readings):   Weight   09/19/22 0448 96.7 kg (213 lb 3 oz)   09/18/22 0458 96.8 kg (213 lb 6.5 oz)   09/17/22 0700 97.4 kg (214 lb 11.7 oz)     Body mass index is 33.39 kg/m².      Intake/Output Summary (Last 24 hours) at 9/19/2022 1509  Last data filed at 9/19/2022 1300  Gross per 24 hour   Intake 960 ml   Output 1850 ml   Net -890 ml       Hemodynamic Parameters:       Telemetry: NSR    Physical Exam  Constitutional:       Appearance: Normal appearance. He is obese.   HENT:      Head: Atraumatic.   Eyes:      Conjunctiva/sclera: Conjunctivae normal.   Cardiovascular:      Rate and Rhythm: Normal rate and regular rhythm.   Pulmonary:      Effort: Pulmonary effort is normal.      Breath sounds: No wheezing or rhonchi.   Abdominal:      General: There is distension.      Palpations: Abdomen is soft. There is no mass.      Tenderness: There is no abdominal tenderness.   Musculoskeletal:      Right lower leg: No edema.      Left lower leg: No edema.   Skin:     General: Skin is warm.      Comments: Right arm, IV line infiltrated, erythematous; multiple bruising in bilateral arms   Neurological:      Mental Status: He is alert  Significant Labs:  CBC:  Recent Labs   Lab 09/17/22  0346 09/18/22  0728 09/19/22  0316   WBC 4.05 4.65 4.31   RBC 3.82* 3.94* 4.01*   HGB 10.5* 11.2* 11.1*   HCT 32.0* 34.4* 33.8*   * 142* 168   MCV 84 87 84   MCH 27.5 28.4 27.7   MCHC 32.8 32.6 32.8     BNP:  Recent Labs   Lab 09/12/22  1805 09/14/22  0537   * 335*     CMP:  Recent Labs   Lab 09/17/22  0346 09/18/22  0728 09/19/22  0316   GLU 98 101 111*   CALCIUM 8.6* 8.4* 8.3*   ALBUMIN 2.8* 2.7* 2.7*   PROT 6.1 6.0 5.8*   * 134* 135*   K 4.0 4.0 3.9   CO2 23 25 24    103 103   BUN 46* 44* 40*   CREATININE 1.7* 1.4 1.4   ALKPHOS 53* 67 79   ALT 20 30 27   AST 22 36 29   BILITOT 0.7 0.7 0.4      Coagulation:   No results for input(s): PT, INR, APTT  in the last 168 hours.  LDH:  No results for input(s): LDH in the last 72 hours.  Microbiology:  Microbiology Results (last 7 days)       Procedure Component Value Units Date/Time    Blood culture [[id number]] Collected: 09/18/22 1117    Order Status: Completed Specimen: Blood from Peripheral, Right Hand Updated: 09/19/22 1412     Blood Culture, Routine No Growth to date      No Growth to date    Blood culture [[id number]] Collected: 09/18/22 1105    Order Status: Completed Specimen: Blood from Antecubital, Right Arm Updated: 09/19/22 1212     Blood Culture, Routine No Growth to date      No Growth to date    Blood culture [[id number]]  (Abnormal) Collected: 09/16/22 0443    Order Status: Completed Specimen: Blood Updated: 09/19/22 0951     Blood Culture, Routine Gram stain aer bottle: Gram positive cocci in clusters resembling Staph      Positive results previously called 09/17/2022  05:34      METHICILLIN RESISTANT STAPHYLOCOCCUS AUREUS  ID consult required at [facility].  For susceptibility see order #[id number]      Narrative:      Lft wrist    Blood culture [[id number]]  (Abnormal)  (Susceptibility) Collected: 09/14/22 1302    Order Status: Completed Specimen: Blood from Wrist, Left Updated: 09/19/22 0734     Blood Culture, Routine Gram stain robert bottle: Gram positive cocci      Positive results previously called 09/15/2022  02:59      METHICILLIN RESISTANT STAPHYLOCOCCUS AUREUS  ID consult required at [facility].       Comment: Previous comment was modified by KRISTINA at 09:12 on 09/17/2022 ID   consult required at [facility].         Blood culture [[id number]]  (Abnormal) Collected: 09/16/22 0443    Order Status: Completed Specimen: Blood Updated: 09/18/22 0748     Blood Culture, Routine Gram stain aer bottle: Gram positive cocci in clusters resembling Staph      Results called to and read back by:Estrellita Eric RN 09/16/2022   22:55      METHICILLIN RESISTANT STAPHYLOCOCCUS AUREUS  ID consult required at [facility].  For susceptibility see order #[id number]      Narrative:      Rt hand    Blood culture [[id number]]  (Abnormal) Collected: 09/15/22 1142    Order Status: Completed Specimen: Blood Updated: 09/18/22 0748     Blood Culture, Routine Gram stain aer bottle: Gram positive cocci in clusters resembling Staph      Positive results previously called 09/16/2022  05:41      METHICILLIN RESISTANT STAPHYLOCOCCUS AUREUS  ID consult required at [facility].  For susceptibility see order #[id number]      Narrative:      Rt AC    Blood culture [[id number]]  (Abnormal) Collected: 09/15/22 1142    Order Status: Completed Specimen: Blood Updated: 09/18/22 0747     Blood Culture, Routine Gram stain aer bottle: Gram positive cocci in clusters resembling Staph      Results called to and read back by:Estrellita Eric RN  09/16/2022  05:03      METHICILLIN RESISTANT STAPHYLOCOCCUS AUREUS  ID consult required at [facility].  For susceptibility see order #[id number]      Narrative:      Rt wrist    Blood culture [[id number]]  (Abnormal) Collected: 09/14/22 1259    Order Status: Completed Specimen: Blood from Peripheral, Antecubital, Left Updated: 09/17/22 1005     Blood Culture, Routine Gram stain robert bottle: Gram positive cocci in clusters resembling Staph      Results called to and read back by: Gilbert Castle RN,  09/15/2022  00:50      Gram stain aer bottle: Gram positive cocci in clusters resembling Staph      Positive results previously called 09/15/2022  03:06      METHICILLIN RESISTANT STAPHYLOCOCCUS AUREUS  ID consult required at [facility].  For susceptibility see order #[id number]      Blood culture [[id number]]     Order Status: Canceled Specimen: Blood     Blood culture [[id number]]     Order Status: Canceled Specimen: Blood      "MRSA/SA Rapid ID by PCR from Blood culture [[id number]]  (Abnormal) Collected: 09/15/22 0050    Order Status: Completed Updated: 09/15/22 0156     Staph aureus ID by PCR Positive     MRSA ID by PCR Positive    Influenza A & B by Molecular [[id number]] Collected: 09/14/22 0632    Order Status: Completed Specimen: Nasopharyngeal Swab Updated: 09/14/22 0725     Influenza A, Molecular Negative     Influenza B, Molecular Negative     Flu A & B Source Nasal swab            I have reviewed all pertinent labs within the past 24 hours.    Estimated Creatinine Clearance: 54.4 mL/min (based on SCr of 1.4 mg/dL).    Diagnostic Results:  I have reviewed and interpreted all pertinent imaging results/findings within the past 24 hours.    Assessment and Plan:     71 yo WM being worked up for LVAD since hospitalization January 2022 for recurrent VT (he thinks had MI) and cardiogenic shock at [facility]. He was  later seen in [facility] where he was treated for cardiogenic shock and sent home on .  He remains on  and is pursuing evaluation for LVAD.     He has had a couple of observation admissions where he was told to be dry at visit June 2022. At July 2022 visit he was noted that he could not tolerate higher doses of meds due to symptomatic hypotension.  He still notes occasional dizzy spells when he 1st stands but there are not too bad at this time.  Uses Lasix only as needed targeting his home weight 218-220.      His case was discussed at selection committee and he was deferred pending evaluation of pancreatic mass.  They wish to proceed with MRI but not sure with his ICD if this will be possible. Home weight this AM was 216#.    Interval History:  Today he comes in due to abdominal discomfort. He ate packaged spaghetti last night and has since been with abdominal pain and dry heaving. No vomiting or diarrhea. He intermittently uses home oxygen 4L and felt the need to start using last night. He states he feels like " a bug got him". He " reports noticing that his HR was significantly elevated over night about 100-115bpm. He is compliant with his lasix. No lower extremity edema. Felt warm this morning. His abdomen appears distended however he reports it normally gets firm/tight when volume overloaded, which is not the case at this time.     Bedside echo shows EF approx 25-30%. IVC measuring approx 1.4cm and collapsible. No JVD. CXR shows bilateral pulmonary congestion.    Echo 9/14/22   Moderate left atrial enlargement.   The left ventricle is severely enlarged with eccentric hypertrophy and severely decreased systolic function.   There is severe left ventricular global hypokinesis with anterospetal and apical scar. The estimated ejection fraction is 10-15%.   Left ventricular diastolic dysfunction.   Normal right ventricular size with normal right ventricular systolic function.   Mild mitral regurgitation.   Normal central venous pressure (3 mmHg).   There is no significant tricuspid regurgitation and therefore the pulmonary artery systolic pressure cannot be reported.         * Chronic combined systolic and diastolic congestive heart failure  - Dobutamine 2.5 mcg/kg/min  - Continue with Entresto 24-26, mg BID  - Holding: Spironolactone 25 mg, daily, Jardiance and was previously on Entresto 49-51 mg, BID  - Lasix held avoid over-diuresing  - Daily weights, Strict I/Os  - Monitor electrolytes and Maintain Mg >2 and K >4  -Telemetry monitoring    Pancreatic lesion  - Gastroenterology consult and recommendations appreciated  - Unable to obtain MRI for further evaluation given CRT-D  - Will hold off on obtaining CT with pancreatic protocol at this time in view of active infection requiring vancomycin and risk of contrast-induced nephrotoxicity in this patient    Bacteremia due to methicillin resistant Staphylococcus aureus  - Monitor WBC count and fever curve, pan-culture if patient spikes  - ID consult and recommendations are appreciated  -  Vancomycin 1,250, daily; Monitor level and renal panel  -patient has persistent positive blood cultures.  Cultures from 18 no growth so far.  His currently on vancomycin.  Id on board.  - consult EP for ICD removal.    H/O ventricular tachycardia  - Amiodarone 300 mg, daily  - Monitor LFTs    Coronary artery disease involving native coronary artery of native heart without angina pectoris  - Asprin 81 mg, daily  - Atorvastatin 80 mg, nightly        Samuel Singh MD  Heart Transplant  [facility] - Cardiology Stepdown

## 2022-09-19 NOTE — PLAN OF CARE
Poc reviewed with pt, SBP in 90/s, spoke with MD, told to give valsartan and pain pill with low BP, no issues with either med, awake most of night, adequate urine output, no BM, Dobutamine at 3.6ml/hr, contact precautions maintained for MRSA bacteremia infection, pain med given x1, WCTM    Problem: Adult Inpatient Plan of Care  Goal: Plan of Care Review  Outcome: Ongoing, Progressing  Goal: Patient-Specific Goal (Individualized)  Outcome: Ongoing, Progressing  Goal: Absence of Hospital-Acquired Illness or Injury  Outcome: Ongoing, Progressing  Goal: Optimal Comfort and Wellbeing  Outcome: Ongoing, Progressing  Goal: Readiness for Transition of Care  Outcome: Ongoing, Progressing     Problem: Adjustment to Illness (Sepsis/Septic Shock)  Goal: Optimal Coping  Outcome: Ongoing, Progressing     Problem: Bleeding (Sepsis/Septic Shock)  Goal: Absence of Bleeding  Outcome: Ongoing, Progressing     Problem: Glycemic Control Impaired (Sepsis/Septic Shock)  Goal: Blood Glucose Level Within Desired Range  Outcome: Ongoing, Progressing     Problem: Infection Progression (Sepsis/Septic Shock)  Goal: Absence of Infection Signs and Symptoms  Outcome: Ongoing, Progressing     Problem: Nutrition Impaired (Sepsis/Septic Shock)  Goal: Optimal Nutrition Intake  Outcome: Ongoing, Progressing     Problem: Infection  Goal: Absence of Infection Signs and Symptoms  Outcome: Ongoing, Progressing     Problem: Impaired Wound Healing  Goal: Optimal Wound Healing  Outcome: Ongoing, Progressing

## 2022-09-20 NOTE — PROGRESS NOTES
[facility] - Cardiology Stepdown  Heart Transplant  Progress Note    Patient Name: Audrey Oneil Jr.  MRN: [id number]  Admission Date: 9/14/2022  Hospital Length of Stay: 6 days  Attending Physician: Sree Perez MD  Primary Care Provider: Primary Doctor No  Principal Problem:Chronic combined systolic and diastolic congestive heart failure    Subjective:   No events overnight.     Assessment and Plan:     73 yo WM being worked up for LVAD since hospitalization January 2022 for recurrent VT (he thinks had MI) and cardiogenic shock at [facility]. He was  later seen in [facility] where he was treated for cardiogenic shock and sent home on .  He remains on  and is pursuing evaluation for LVAD.     His case was discussed at selection committee and he was deferred pending evaluation of pancreatic mass.  They wish to proceed with MRI but not sure with his ICD if this will be possible.    Meanwhile Presents with MRSA bacteremia.    Bedside echo shows EF approx 25-30%. IVC measuring approx 1.4cm and collapsible. No JVD. CXR shows bilateral pulmonary congestion.    Echo 9/14/22   Moderate left atrial enlargement.   The left ventricle is severely enlarged with eccentric hypertrophy and severely decreased systolic function.   There is severe left ventricular global hypokinesis with anterospetal and apical scar. The estimated ejection fraction is 10-15%.   Left ventricular diastolic dysfunction.   Normal right ventricular size with normal right ventricular systolic function.   Mild mitral regurgitation.   Normal central venous pressure (3 mmHg).   There is no significant tricuspid regurgitation and therefore the pulmonary artery systolic pressure cannot be reported.         * Chronic combined systolic and diastolic congestive heart failure  - Dobutamine 2.5 mcg/kg/min  - Continue with Entresto 24-26, mg BID  - Holding: Spironolactone 25 mg, daily, Jardiance and was previously on Entresto 49-51 mg, BID  - Lasix held avoid  over-diuresing  - Daily weights, Strict I/Os  - Monitor electrolytes and Maintain Mg >2 and K >4  -Telemetry monitoring    Pancreatic lesion  - Gastroenterology consult and recommendations appreciated  - Unable to obtain MRI for further evaluation given CRT-D  - Will hold off on obtaining CT with pancreatic protocol at this time in view of active infection requiring vancomycin and risk of contrast-induced nephrotoxicity in this patient    Bacteremia due to methicillin resistant Staphylococcus aureus  - Monitor WBC count and fever curve, pan-culture if patient spikes  - ID consult and recommendations are appreciated  - Vancomycin 1,250, daily; Monitor level and renal panel  -patient has persistent positive blood cultures.  Cultures from 18 no growth so far.  His currently on vancomycin.  Id on board.  - HARRY tomorrow, NPO after midnight.  -Lead extraction early next wk followed by eval of Pancreatic mass after cr stabilization.  -will eventually need abx for 6 wks.    H/O ventricular tachycardia  - Amiodarone 300 mg, daily  - Monitor LFTs    Coronary artery disease involving native coronary artery of native heart without angina pectoris  - Asprin 81 mg, daily  - Atorvastatin 80 mg, nightly        Benton Rendon MD  Heart Transplant  [facility] - Cardiology Stepdown

## 2022-09-20 NOTE — PLAN OF CARE
Updated plan after rounds:  -HARRY scheduled for tomorrow  -Patient with no history of CABG or sternal incision, will need CTS back-up for this case. Will plan for early next week depending on HARRY results.  -Can consult them after HARRY results tomorrow  -Continue antibiotics per ID recs

## 2022-09-20 NOTE — PROGRESS NOTES
[facility] - Cardiology Stepdown  Infectious Disease  Progress Note    Patient Name: Audrey Oneil Jr.  MRN: [id number]  Admission Date: 9/14/2022  Length of Stay: 5 days  Attending Physician: Sree Perez MD  Primary Care Provider: Primary Doctor No    Isolation Status: Contact  Assessment/Plan:      Cellulitis of left forearm  Erythema, edema, tenderness and warmth of the right forearm. Patient reports this was a prior IV site.   · Continue vancomycin.   · Ice and elevation.   · Consider ultrasounds to assess for phlebitis.   · If unable to get therapeutic vancomycin levels and ongoing concern for vancomycin related adverse events then will consider transitioning to other antistaphylococcal agent (I.e. Dapto).    Septicemia due to methicillin resistant Staphylococcus aureus  Septicemia on admission due to MRSA bacteremia. Fever resolved and without leukocytosis. Blood cultures 9/14-9/16 remain positive for MRSA. Repeat blood cxrs collected (09/18): NGTD.  · Continue IV-Vancomycin; with continued monitoring for  Therapeutic Vanc trough (14.3 on (09/19),signs of worsening renal function and for possible adverse drug reactions or events-- in which case, may consider alternatives such as Daptomycin.  · PharmD service to manage vancomycin dosing and drug monitoring; goal trough 15-20.  · Recommend abx treatment duration of 6wks s/p ICD-removal or negative blood cxrs--whichever occurs later.  · EP already consulted for device extraction given bacteremia with MRSA. Will defer HARRY as leads will need to be extracted regardless of whether any vegetation is visualized. No obvious valvular lesions seen on TTE.  · Will continue to follow pt    Bacteremia due to methicillin resistant Staphylococcus aureus  Unclear source of bacteremia however potential sources include CVC which has been removed, and translocation from skin in the setting of multiple skin eschars.   · Management plan as detailed below, see  septicemia-mrsa.        Thank you for your consult. I will follow-up with patient. Please contact us if you have any additional questions.    Isiah Aragon PA-C  Infectious Disease  [facility] - Cardiology Stepdown    Subjective:     Principal Problem:Chronic combined systolic and diastolic congestive heart failure    HPI: HPI: Patient is a 71yo, male, with PMH of CAD s/p MI, chronic combined systolic and diastolic heart failure on home dobutamine (EF15%; NYHA-3), HTN/HLD, CKD 3, chronic back pain, cardiac resynch therapy defib (CRT-D), hx of recurrent Vtach and cardiogenic shock (01/2022) at [facility]; which prompted patient re-decision to pursue eval / consideration for LVAD and potential heart transplant recipient. Currently patient case being deferred for transplant recipient selection pending further of recently found pancreatic mass with advanced Imaging study to consider ICD in place; but pt is also still under consideration for DT-LVAD.  --Patient presented to ED (09/14) for new acute onset of central generalized abdominal pain with Right flank pain, headache, nausea, fever (102.2F on arrival), with tachycardia (up to 114bpm on arrival), abdominal distention, SOB requiring 4L home oxygen which began less than 12hrs after eating packaged spaghetti - reports being compliant with at-home medications.     Patient found to have MRSA-bacteremia w/ sepsis criteria on arrival (101.2F, 114bpm); unclear source of infection -- possibly eschar burn wound at left wrist (occurred 4-days ago) vs  PICC line less likley due to absence of signs of infected. Blood cxr x2 (09/14): speciated Staph aureus (ID / susc pending); but MRSA-ID PCR: Positive (09/15).   Patient started on IV-Vanc (2g). Fever reduced with anti-pyretic, stable at 99F, endorses sweats. Remains without leukocytosis.   (09/16) CrCL: 45.1mL/min, up trend from (CrCl:35) day prior 09/15.    Repeat blood cxrs x2 (09/15): GPC-cluster, resembling staph (ID / susc  pending)  Repeat blood cxr x2 (09/16): sent --awaiting results.  TTE (09/14): negative for valve vegetations or abscesses; showed left sided atrial and ventricular chamber abnormalities; EF10-15%, LV-diastolic dysfunction; mild mitral regurg.  09/14: Chest-xr: shows increasing pulmonary interstitial edema.  09/14: CT-AP (w/o contrast): No acute abdomen/pelvic abnormality to account for pt hx of pain. showed multiple stable indeterminate pancreatic hypoattenuating lesions. Sigmoid colonic diverticulosis without diverticulitis or other bowel inflammatory process. Cholelithiasis. Cardiomegaly and trace pericardial fluid.          Interval History: Patient developed erythematous painful rash at area of previous IV-site at right forearm (09/18), possible cellulitis vs. Phlebitis. Fever resolved 09/18,  afebrile since, and without leukocytosis. Night sweats resolved, and some improvement in appetite.   CrCl: 54mL/min (stable, slightly improved from previous few days inpatient)   Patient continuing on IV-Vanc; Vanc trough (09/19) 14.3.    Review of Systems   Constitutional:  Positive for appetite change and fatigue. Negative for chills, diaphoresis and fever.   Respiratory:  Positive for shortness of breath.    Cardiovascular: Negative.    Gastrointestinal:  Positive for abdominal distention, abdominal pain and nausea. Negative for constipation and vomiting.   Genitourinary: Negative.    Musculoskeletal:  Positive for myalgias.   Skin:  Positive for rash.        Right forearm, painful, erythematous rash   Neurological:  Negative for headaches.   Psychiatric/Behavioral:  Negative for confusion.    Objective:     Vital Signs (Most Recent):  Temp: 97.2 °F (36.2 °C) (09/19/22 1556)  Pulse: 81 (09/19/22 1556)  Resp: 16 (09/19/22 1734)  BP: 137/62 (09/19/22 1556)  SpO2: 98 % (09/19/22 1556) Vital Signs (24h Range):  Temp:  [96.4 °F (35.8 °C)-97.8 °F (36.6 °C)] 97.2 °F (36.2 °C)  Pulse:  [72-81] 81  Resp:  [14-20] 16  SpO2:  [93  %-98 %] 98 %  BP: ()/(54-62) 137/62     Weight: 96.7 kg (213 lb 3 oz)  Body mass index is 33.39 kg/m².    Estimated Creatinine Clearance: 54.4 mL/min (based on SCr of 1.4 mg/dL).    Physical Exam    Significant Labs: All pertinent labs within the past 24 hours have been reviewed.    Significant Imaging: I have reviewed all pertinent imaging results/findings within the past 24 hours.

## 2022-09-20 NOTE — PLAN OF CARE
Pt remained free of injuries, falls, and trauma. VSS. No complaints of pain mentioned. L midline placed today.Continuous  infusing at prescribed rate. IV abx continued. Pt NPO after MN. Fall precautions maintained. Plan of care reviewed w/ pt. Pt verbalized understanding. Will continue to monitor.

## 2022-09-20 NOTE — PROGRESS NOTES
Pharmacokinetic Assessment Follow Up: IV Vancomycin    Vancomycin serum concentration assessment(s):  Vancomycin trough resulted at 14.3, which may be used to guide therapy. Renal function improving. SCr today is 1.3 mg/dL. Trough goal 15-20. Plan to continue vancomycin 1.25gm IV Q 24 hours. Follow up trough ordered for 9.23 at 1330.     Alexandria Barnes, PharmD, BCPS  Heart Transplant Clinical Specialist   Spectralink: [id number]    Drug levels (last 3 results):  Recent Labs   Lab Result Units 09/19/22  1319   Vancomycin-Trough ug/mL 14.3        Patient brief summary:  Audrey Oneil Jr. is a 72 y.o. male initiated on antimicrobial therapy with IV Vancomycin for treatment of bacteremia    Drug Allergies:   Review of patient's allergies indicates:   Allergen Reactions    Iodine containing multivitamin Swelling     itching    Keflex [cephalexin] Swelling     Eyes.  Tolerated multiple doses of zosyn and 1 dose of augmentin in 2015 and 2016, respectively    Peaches [peach (prunus persica)] Swelling     eyes    Shellfish containing products Swelling    Fig tree Swelling     itching    Tuberculin spenser test ppd Rash       Actual Body Weight:   98.2kg    Renal Function:   Estimated Creatinine Clearance: 59 mL/min (based on SCr of 1.3 mg/dL).,     Dialysis Method (if applicable):  N/A    CBC (last 72 hours):  Recent Labs   Lab Result Units 09/18/22 0728 09/19/22 0316 09/20/22  0508   WBC K/uL 4.65 4.31 4.80   Hemoglobin g/dL 11.2* 11.1* 11.1*   Hematocrit % 34.4* 33.8* 34.2*   Platelets K/uL 142* 168 203   Gran % % 65.9 59.3 58.1   Lymph % % 21.1 28.3 29.4   Mono % % 10.3 9.3 9.4   Eosinophil % % 1.9 2.1 2.1   Basophil % % 0.4 0.5 0.4   Differential Method  Automated Automated Automated       Metabolic Panel (last 72 hours):  Recent Labs   Lab Result Units 09/18/22 0728 09/19/22 0316 09/20/22  0508   Sodium mmol/L 134* 135* 134*   Potassium mmol/L 4.0 3.9 4.0   Chloride mmol/L 103 103 103   CO2 mmol/L 25 24 25   Glucose mg/dL  101 111* 97   BUN mg/dL 44* 40* 33*   Creatinine mg/dL 1.4 1.4 1.3   Albumin g/dL 2.7* 2.7* 2.8*   Total Bilirubin mg/dL 0.7 0.4 0.4   Alkaline Phosphatase U/L 67 79 77   AST U/L 36 29 22   ALT U/L 30 27 24   Magnesium mg/dL 2.4 2.4 2.3       Vancomycin Administrations:  vancomycin given in the last 96 hours                     vancomycin 1.25 g in dextrose 5% 250 mL IVPB (ready to mix) (mg) 1,250 mg New Bag 09/19/22 1430     1,250 mg New Bag 09/18/22 1439     1,250 mg New Bag 09/17/22 1446     1,250 mg New Bag 09/16/22 1457                    Microbiologic Results:  Microbiology Results (last 7 days)       Procedure Component Value Units Date/Time    Blood culture [[id number]] Collected: 09/18/22 1105    Order Status: Completed Specimen: Blood from Antecubital, Right Arm Updated: 09/20/22 1212     Blood Culture, Routine No Growth to date      No Growth to date      No Growth to date    Blood culture [[id number]] Collected: 09/18/22 1117    Order Status: Completed Specimen: Blood from Peripheral, Right Hand Updated: 09/19/22 1412     Blood Culture, Routine No Growth to date      No Growth to date    Blood culture [[id number]]  (Abnormal) Collected: 09/16/22 0443    Order Status: Completed Specimen: Blood Updated: 09/19/22 0951     Blood Culture, Routine Gram stain aer bottle: Gram positive cocci in clusters resembling Staph      Positive results previously called 09/17/2022  05:34      METHICILLIN RESISTANT STAPHYLOCOCCUS AUREUS  ID consult required at [facility].  For susceptibility see order #[id number]      Narrative:      Lft wrist    Blood culture [[id number]]  (Abnormal)  (Susceptibility) Collected: 09/14/22 1302    Order Status: Completed Specimen: Blood from Wrist, Left Updated: 09/19/22 0734     Blood Culture, Routine Gram stain robert bottle: Gram positive cocci      Positive results previously called 09/15/2022  02:59      METHICILLIN RESISTANT STAPHYLOCOCCUS AUREUS  ID consult  required at [facility].       Comment: Previous comment was modified by JNR at 09:12 on 09/17/2022 ID   consult required at [facility].         Blood culture [[id number]]  (Abnormal) Collected: 09/16/22 0443    Order Status: Completed Specimen: Blood Updated: 09/18/22 0748     Blood Culture, Routine Gram stain aer bottle: Gram positive cocci in clusters resembling Staph      Results called to and read back by:Estrellita Eric RN 09/16/2022  22:55      METHICILLIN RESISTANT STAPHYLOCOCCUS AUREUS  ID consult required at [facility].  For susceptibility see order #[id number]      Narrative:      Rt hand    Blood culture [[id number]]  (Abnormal) Collected: 09/15/22 1142    Order Status: Completed Specimen: Blood Updated: 09/18/22 0748     Blood Culture, Routine Gram stain aer bottle: Gram positive cocci in clusters resembling Staph      Positive results previously called 09/16/2022  05:41      METHICILLIN RESISTANT STAPHYLOCOCCUS AUREUS  ID consult required at [facility].  For susceptibility see order #[id number]      Narrative:      Rt AC    Blood culture [[id number]]  (Abnormal) Collected: 09/15/22 1142    Order Status: Completed Specimen: Blood Updated: 09/18/22 0747     Blood Culture, Routine Gram stain aer bottle: Gram positive cocci in clusters resembling Staph      Results called to and read back by:Estrellita Eric RN  09/16/2022  05:03      METHICILLIN RESISTANT STAPHYLOCOCCUS AUREUS  ID consult required at [facility].  For susceptibility see order #[id number]      Narrative:      Rt wrist    Blood culture [[id number]]  (Abnormal) Collected: 09/14/22 1259    Order Status: Completed Specimen: Blood from Peripheral, Antecubital, Left Updated: 09/17/22 1005     Blood Culture, Routine Gram stain robert bottle: Gram positive cocci in clusters resembling Staph      Results called  to and read back by: Gilbert Castle RN,  09/15/2022  00:50      Gram stain aer bottle: Gram positive cocci in clusters resembling Staph      Positive results previously called 09/15/2022  03:06      METHICILLIN RESISTANT STAPHYLOCOCCUS AUREUS  ID consult required at [facility].Daniel,Leigh Ann and Nancy orellana.  For susceptibility see order #[id number]      Blood culture [[id number]]     Order Status: Canceled Specimen: Blood     Blood culture [[id number]]     Order Status: Canceled Specimen: Blood     MRSA/SA Rapid ID by PCR from Blood culture [[id number]]  (Abnormal) Collected: 09/15/22 0050    Order Status: Completed Updated: 09/15/22 0156     Staph aureus ID by PCR Positive     MRSA ID by PCR Positive    Influenza A & B by Molecular [[id number]] Collected: 09/14/22 0632    Order Status: Completed Specimen: Nasopharyngeal Swab Updated: 09/14/22 0725     Influenza A, Molecular Negative     Influenza B, Molecular Negative     Flu A & B Source Nasal swab

## 2022-09-20 NOTE — ASSESSMENT & PLAN NOTE
Unclear source of bacteremia however potential sources include CVC which has been removed, and translocation from skin in the setting of multiple skin eschars.   · Management plan as detailed below, see septicemia-mrsa.

## 2022-09-20 NOTE — ASSESSMENT & PLAN NOTE
Erythema, edema, tenderness and warmth of the right forearm. Patient reports this was a prior IV site.   · Continue vancomycin.   · Ice and elevation.   · Consider ultrasounds to assess for phlebitis.   · If unable to get therapeutic vancomycin levels and ongoing concern for vancomycin related adverse events then will consider transitioning to other antistaphylococcal agent (I.e. Dapto).

## 2022-09-20 NOTE — ANESTHESIA PREPROCEDURE EVALUATION
[facility]  Anesthesia Pre-Operative Evaluation         Patient Name: Audrey Oneil Jr.  YOB: 1950  MRN: [id number]    SUBJECTIVE:     Pre-operative evaluation for Procedure(s) (LRB):  ECHOCARDIOGRAM,TRANSESOPHAGEAL (N/A)     09/20/2022    Audrey Oneil Jr. is a 72 y.o. male w/ a significant PMHx of DVT/PE, CAD/ACS (s/p stenting), HFrEF (10-15%), s/p AICD, Afib, HLD, and HTN. He is currently being worked up for an LVAD after hospitalization for recurrent VT and cardiogenic shock. He is maintained on a  gtt. He presented this hospitalization for abdominal pain/distention and found to have MRSA bacteremia. EP consulted for device extraction and planning for HARRY.    Patient now presents for the above procedure(s).      LDA:        Peripheral IV - Single Lumen 09/19/22 1130 20 G Left;Posterior Hand (Active)   Site Assessment Clean;Dry;Intact 09/20/22 0800   Extremity Assessment Distal to IV No abnormal discoloration;No redness;No swelling;No warmth 09/20/22 0800   Line Status Saline locked 09/20/22 0800   Dressing Status Clean;Dry;Intact 09/20/22 0800   Dressing Intervention Integrity maintained 09/20/22 0800   Number of days: 1            Peripheral IV - Single Lumen 09/19/22 1058 20 G;1 3/4 in Left Forearm (Active)   Site Assessment Dry;Intact;Clean 09/20/22 0800   Extremity Assessment Distal to IV No abnormal discoloration;No redness;No swelling;No warmth 09/20/22 0800   Line Status Saline locked 09/20/22 0800   Dressing Status Clean;Dry;Intact 09/20/22 0800   Dressing Intervention Integrity maintained 09/20/22 0800   Number of days: 1            Midline Catheter Insertion/Assessment  - Double Lumen 09/19/22 1058 Left brachial vein 18g x 8cm (Active)   Site Assessment Clean;Dry;Intact 09/20/22 0800   Line 1 Status Infusing 09/20/22 0800   Line 2 Status Capped;Saline locked 09/20/22 0800   Dressing Type Biopatch in place;Central line dressing 09/20/22 0800   Dressing Status  Clean;Dry;Intact 09/20/22 0800   Dressing Intervention Integrity maintained 09/20/22 0800   Dressing Change Due 09/26/22 09/19/22 1953   Reason Not Rotated Not due 09/19/22 1953   Number of days: 1       Prev airway: None documented.    Drips:    DOBUTamine IV infusion (non-titrating) 2.5 mcg/kg/min (09/20/22 1049)       Patient Active Problem List   Diagnosis    Coronary artery disease involving native coronary artery of native heart without angina pectoris    Chronic combined systolic and diastolic heart failure    Obesity (BMI 30.0-34.9)    Cardiomyopathy, ischemic    Hx pulmonary embolism 2010 provoked dvt     Postural dizziness with presyncope    History of DVT (deep vein thrombosis)    Ventricular tachycardia    Hypertensive cardiovascular-renal disease, stage 1-4 or unspecified chronic kidney disease, with heart failure    Presence of cardiac resynchronization therapy defibrillator (CRT-D)    Mixed hyperlipidemia    Long term current use of amiodarone    Thoracic aortic atherosclerosis    Stage 3a chronic kidney disease    Prediabetes    LBBB (left bundle branch block)    Elevated troponin I level    Gout    Chronic combined systolic and diastolic congestive heart failure    H/O ventricular tachycardia    Vitamin D deficiency    Blue skin    MRSA infection    Rotator cuff syndrome    Severe sepsis    Pressure injury of heel, stage 2    Dermatitis associated with moisture    Skin breakdown    Leukocytosis    Bacteremia due to methicillin resistant Staphylococcus aureus    Septicemia due to methicillin resistant Staphylococcus aureus    Pancreatic lesion    Cellulitis of left forearm       Review of patient's allergies indicates:   Allergen Reactions    Iodine containing multivitamin Swelling     itching    Keflex [cephalexin] Swelling     Eyes.  Tolerated multiple doses of zosyn and 1 dose of augmentin in 2015 and 2016, respectively    Peaches [peach (prunus persica)]  Swelling     eyes    Shellfish containing products Swelling    Fig tree Swelling     itching    Tuberculin spenser test ppd Rash       Current Inpatient Medications:   acetaminophen  650 mg Oral QID    allopurinoL  200 mg Oral Daily    amiodarone  300 mg Oral Daily    aspirin  81 mg Oral Daily    atorvastatin  80 mg Oral Daily    enoxaparin  40 mg Subcutaneous Daily    LIDOcaine  1 patch Transdermal Q24H    sacubitriL-valsartan  1 tablet Oral BID    vancomycin (VANCOCIN) IVPB  1,250 mg Intravenous Q24H       No current facility-administered medications on file prior to encounter.     Current Outpatient Medications on File Prior to Encounter   Medication Sig Dispense Refill    allopurinoL (ZYLOPRIM) 100 MG tablet Take 2 tablets (200 mg total) by mouth once daily. 60 tablet 0    amiodarone (PACERONE) 200 MG Tab TAKE 1 AND 1/2 TABLETS(300 MG) BY MOUTH EVERY  tablet 3    aspirin (ECOTRIN) 81 MG EC tablet Take 1 tablet (81 mg total) by mouth once daily. 90 tablet 3    atorvastatin (LIPITOR) 80 MG tablet Take 1 tablet (80 mg total) by mouth once daily. 90 tablet 1    DOBUTamine (DOBUTREX) 500 mg/250 mL (2,000 mcg/mL) 2.5 mcg/kg/min  Patient is 95.7 kg 250 mL 5    furosemide (LASIX) 40 MG tablet Take 1 tablet (40 mg total) by mouth daily as needed (Weight gain of 3 lbs in one day or 5 lbs in one week). 30 tablet 11    HYDROcodone-acetaminophen (NORCO)  mg per tablet Take 1 tablet by mouth every 6 (six) hours.      JARDIANCE 25 mg tablet Take 1 tablet (25 mg total) by mouth once daily. 30 tablet 5    ondansetron (ZOFRAN-ODT) 4 MG TbDL Dissolve 1 tablet (4 mg total) by mouth every 6 (six) hours as needed (nausea). 30 tablet 1    polyethylene glycol (GOLYTELY) 236-22.74-6.74 -5.86 gram suspension SMARTSIG:Milliliter(s) By Mouth      sacubitriL-valsartan (ENTRESTO) 49-51 mg per tablet Take 1 tablet by mouth 2 (two) times daily. 60 tablet 3    simethicone (MYLICON) 80 MG chewable tablet Take 1  tablet (80 mg total) by mouth every 6 (six) hours as needed for Flatulence. 60 tablet 1    spironolactone (ALDACTONE) 25 MG tablet Take 1 tablet (25 mg total) by mouth once daily. 30 tablet 3    [DISCONTINUED] clopidogreL (PLAVIX) 75 mg tablet Take 1 tablet (75 mg total) by mouth once daily. (Patient not taking: No sig reported) 90 tablet 3    [DISCONTINUED] colchicine (COLCRYS) 0.6 mg tablet Take 1 tablet (0.6 mg total) by mouth once daily. (Patient not taking: Reported on 3/18/2022) 90 tablet 1    [DISCONTINUED] ipratropium (ATROVENT) 0.02 % nebulizer solution Take 2.5 mLs (500 mcg total) by nebulization 4 (four) times daily as needed for Wheezing. Rescue (Patient not taking: Reported on 3/29/2022) 75 mL 0    [DISCONTINUED] metoprolol succinate (TOPROL-XL) 25 MG 24 hr tablet Take 1 tablet (25 mg total) by mouth once daily. 90 tablet 3    [DISCONTINUED] midodrine (PROAMATINE) 2.5 MG Tab Take 1 tablet (2.5 mg total) by mouth 3 (three) times daily. HOLD until follow up with cardiology 90 tablet 0       Past Surgical History:   Procedure Laterality Date    APPENDECTOMY      BACK SURGERY      CARDIAC DEFIBRILLATOR PLACEMENT      CARDIAC SURGERY  2007    stent    CARPAL TUNNEL RELEASE Right 04/2018    COLONOSCOPY N/A 8/8/2022    Procedure: COLONOSCOPY;  Surgeon: Sascha Keenan MD;  Location: [facility] ENDO ([address] [place] [address]);  Service: Endoscopy;  Laterality: N/A;  EF-15%  pre heart    AICD-St Rodri       COVID test [facility] 8/5-inst mail-am prep-clears 4 hrs prior-tb    INSERTION OF BIVENTRICULAR IMPLANTABLE CARDIOVERTER-DEFIBRILLATOR (ICD) N/A 5/3/2019    Procedure: INSERTION, ICD, BIVENTRICULAR;  Surgeon: Teofilo Pal MD;  Location: [facility] EP LAB;  Service: Cardiology;  Laterality: N/A;  ICH CM,  ICD UPGD BI-V,  SJM, MAC, FAS, 3PREP (dual ICD INSITU)    r knee scope      REVISION OF SKIN POCKET FOR CARDIOVERTER-DEFIBRILLATOR Left 5/3/2019    Procedure: Revision, Skin Pocket, For Cardioverter-Defibrillator;   Surgeon: Teofilo Pal MD;  Location: [facility] EP LAB;  Service: Cardiology;  Laterality: Left;    RIGHT HEART CATHETERIZATION Right 6/14/2021    Procedure: INSERTION, CATHETER, RIGHT HEART;  Surgeon: Lizz Nieto MD;  Location: [facility] CATH LAB;  Service: Cardiology;  Laterality: Right;    RIGHT HEART CATHETERIZATION Right 6/17/2022    Procedure: INSERTION, CATHETER, RIGHT HEART;  Surgeon: Migue Henry Jr., MD;  Location: [facility] CATH LAB;  Service: Cardiology;  Laterality: Right;    SPINE SURGERY      TONSILLECTOMY      TRIGGER FINGER RELEASE Right 04/2018    thumb       Social History:  Tobacco Use: Medium Risk    Smoking Tobacco Use: Former    Smokeless Tobacco Use: Never      Alcohol Use: Not on file        OBJECTIVE:     Vital Signs Range (Last 24H):  Temp:  [36.2 °C (97.2 °F)-37.1 °C (98.8 °F)]   Pulse:  [68-81]   Resp:  [16-20]   BP: (113-137)/(54-71)   SpO2:  [93 %-98 %]       Significant Labs:  Lab Results   Component Value Date    WBC 4.80 09/20/2022    HGB 11.1 (L) 09/20/2022    HCT 34.2 (L) 09/20/2022     09/20/2022    CHOL 145 05/18/2022    TRIG 186 (H) 05/18/2022    HDL 50 05/18/2022    ALT 24 09/20/2022    AST 22 09/20/2022     (L) 09/20/2022    K 4.0 09/20/2022     09/20/2022    CREATININE 1.3 09/20/2022    BUN 33 (H) 09/20/2022    CO2 25 09/20/2022    TSH 2.834 06/22/2022    PSA 2.9 05/18/2022    INR 1.0 06/17/2022    HGBA1C 5.6 05/28/2022       Diagnostic Studies: No relevant studies.    EKG:   Results for orders placed or performed during the hospital encounter of 09/14/22   EKG 12-lead    Collection Time: 09/14/22 12:11 PM    Narrative    Test Reason : R11.10,    Vent. Rate : 118 BPM     Atrial Rate : 131 BPM     P-R Int : 200 ms          QRS Dur : 160 ms      QT Int : 348 ms       P-R-T Axes : 095 145 -29 degrees     QTc Int : 487 ms    Baseline Artifact  Atrial fibrillation with rapid ventricular response  Nonspecific intraventricular block  Abnormal R wave  progression  Abnormal ECG  When compared with ECG of 25-JUN-2022 21:34,  Atrial fibrillation has replaced Electronic ventricular pacemaker  Vent. rate has increased BY  41 BPM  Confirmed by Balbir Lofton MD ([id number]) on 9/15/2022 12:24:25 AM    Referred By: [email or web address]   SELF           Confirmed By:Balbir Lofton MD       2D ECHO:  TTE:  Results for orders placed or performed during the hospital encounter of 09/14/22   Echo   Result Value Ref Range    Ascending aorta 3.52 cm    STJ 3.46 cm    AV mean gradient 6 mmHg    Ao peak lurdes 1.51 m/s    Ao VTI 27.82 cm    IVS 0.75 0.6 - 1.1 cm    LA size 4.17 cm    Left Atrium Major Axis 7.26 cm    Left Atrium Minor Axis 5.87 cm    LVIDd 7.05 (A) 3.5 - 6.0 cm    LVIDs 6.62 (A) 2.1 - 4.0 cm    LVOT diameter 2.05 cm    LVOT peak VTI 16.35 cm    Posterior Wall 0.95 0.6 - 1.1 cm    RA Major Axis 4.61 cm    RA Width 3.98 cm    RVDD 3.87 cm    Sinus 3.29 cm    TAPSE 1.99 cm    TDI LATERAL 0.06 m/s    TDI SEPTAL 0.07 m/s    LA WIDTH 4.26 cm    LV Diastolic Volume 259.70 mL    LV Systolic Volume 225.03 mL    LVOT peak lurdes 0.84 m/s    FS 6 %    LA volume 98.02 cm3    LV mass 266.44 g    Left Ventricle Relative Wall Thickness 0.27 cm    AV valve area 1.94 cm2    AV Velocity Ratio 0.56     AV index (prosthetic) 0.59     Mean e' 0.07 m/s    LVOT area 3.3 cm2    LVOT stroke volume 53.94 cm3    AV peak gradient 9 mmHg    BSA 2.15 m2    LV Systolic Volume Index 107.7 mL/m2    LV Diastolic Volume Index 124.26 mL/m2    LA Volume Index 46.9 mL/m2    LV Mass Index 127 g/m2    Right Atrial Pressure (from IVC) 3 mmHg    EF 12 %    Narrative    · Moderate left atrial enlargement.  · The left ventricle is severely enlarged with eccentric hypertrophy and   severely decreased systolic function.  · There is severe left ventricular global hypokinesis with anterospetal   and apical scar. The estimated ejection fraction is 10-15%.  · Left ventricular diastolic dysfunction.  · Normal right ventricular size with  normal right ventricular systolic   function.  · Mild mitral regurgitation.  · Normal central venous pressure (3 mmHg).  · There is no significant tricuspid regurgitation and therefore the   pulmonary artery systolic pressure cannot be reported.          HARRY:  No results found. However, due to the size of the patient record, not all encounters were searched. Please check Results Review for a complete set of results.     Cardiac Device Check (8/8/22):  REMOTE Interrogation Report   Presenting E gram demonstrates:  As/Bp.   Device fxn WNL.   Autocapture algorithms are RA, RV, LV (ON).   RA pacing  0%.       Bi-V pacing  100%.   Atrial arrhythmias:  None.   Ventricular arrhythmias:  None.   Battery Status/Longevity:  3.8-4.2 yrs.      RHC (5/13/22):  RHC performed on  2.5 mcg/kg/min.:                Right atrial pressure is upper normal.                Pulmonary capillary wedge pressure is moderately elevated.                Pulmonary artery pressure is mildly elevated.                Pulmonary vascular resistance is normal.                Johnna cardiac output and index is normal on  2.5 mcg/kg/min.                Systemic vascular resistance is 1300.    CPX (6/16/22):   Severe functional impairment associated with a normal breathing reserve, normal oxygen stauration, poor effort. These findings are indicative of functional impairment secondary to poor effort.   There were no arrhythmias during stress.   There was no ST segment deviation noted during stress.   The patient's exercise capacity was severely impaired.   The test was stopped because the patient experienced fatigue.    ASSESSMENT/PLAN:           Pre-op Assessment    I have reviewed the Patient Summary Reports.     I have reviewed the Nursing Notes.    I have reviewed the Medications.     Review of Systems  Anesthesia Hx:  Denies Hx of Anesthetic complications  History of prior surgery of interest to airway management or planning:  Denies Personal  Hx of Anesthesia complications.   Social:  Former Smoker    Cardiovascular:   Pacemaker Hypertension Past MI CAD  CABG/stent Dysrhythmias atrial fibrillation CHF hyperlipidemia    Pulmonary:  Pulmonary Normal    Renal/:   Chronic Renal Disease, CRI    Hepatic/GI:  Hepatic/GI Normal    Neurological:  Neurology Normal    Endocrine:  Endocrine Normal  Obesity / BMI > 30      Physical Exam  General: Well nourished, Alert and Oriented    Airway:  Mallampati: III   Mouth Opening: Normal  TM Distance: Normal  Tongue: Normal  Neck ROM: Normal ROM    Dental:  Intact    Chest/Lungs:  Clear to auscultation, Normal Respiratory Rate    Heart:  Rate: Normal  Rhythm: Regular Rhythm  Sounds: Normal        Anesthesia Plan  Type of Anesthesia, risks & benefits discussed:    Anesthesia Type: Gen Natural Airway, MAC  Intra-op Monitoring Plan: Standard ASA Monitors and Art Line  Post Op Pain Control Plan: multimodal analgesia and IV/PO Opioids PRN  Induction:  IV  Informed Consent: Informed consent signed with the Patient and all parties understand the risks and agree with anesthesia plan.  All questions answered.   ASA Score: 4  Day of Surgery Review of History & Physical: H&P Update referred to the surgeon/provider.    Ready For Surgery From Anesthesia Perspective.     .

## 2022-09-20 NOTE — PROGRESS NOTES
Interdisciplinary Rounds Report:   Attended interdisciplinary rounds with the [facility]/CTS services including the LVAD Coordinators, social workers, cardiologists, surgeons,  PT/OT/Speech, dietician, and unit charge nurses. Discussed patient status, plan of care, goals of care, including DC date, and post discharge needs. Plan of care will be discussed with the patient and/or family per the physician while rounding on the floor. This is a recurring meeting that is medically and socially necessary to collaborate with the interdisciplinary team to assist patient needs and safe discharge.

## 2022-09-20 NOTE — ASSESSMENT & PLAN NOTE
Septicemia on admission due to MRSA bacteremia. Fever resolved and without leukocytosis. Blood cultures 9/14-9/16 remain positive for MRSA. Repeat blood cxrs collected (09/18): NGTD.  · Continue IV-Vancomycin; with continued monitoring for  Therapeutic Vanc trough (14.3 on (09/19),signs of worsening renal function and for possible adverse drug reactions or events-- in which case, may consider alternatives such as Daptomycin.  · PharmD service to manage vancomycin dosing and drug monitoring; goal trough 15-20.  · Recommend abx treatment duration of 6wks s/p ICD-removal or negative blood cxrs--whichever occurs later.  · EP already consulted for device extraction given bacteremia with MRSA. Will defer HARRY as leads will need to be extracted regardless of whether any vegetation is visualized. No obvious valvular lesions seen on TTE.  · Will continue to follow pt

## 2022-09-20 NOTE — SUBJECTIVE & OBJECTIVE
Interval History: No acute issues overnight. Plan for HARRY in am. NPO after midnight. Plan for LEAD extraction mostly early next wk.  Continuous Infusions:   DOBUTamine IV infusion (non-titrating) 2.5 mcg/kg/min (09/20/22 1049)     Scheduled Meds:   acetaminophen  650 mg Oral QID    allopurinoL  200 mg Oral Daily    amiodarone  300 mg Oral Daily    aspirin  81 mg Oral Daily    atorvastatin  80 mg Oral Daily    enoxaparin  40 mg Subcutaneous Daily    LIDOcaine  1 patch Transdermal Q24H    sacubitriL-valsartan  1 tablet Oral BID    vancomycin (VANCOCIN) IVPB  1,250 mg Intravenous Q24H     PRN Meds:HYDROcodone-acetaminophen, methocarbamoL, ondansetron, senna-docusate 8.6-50 mg, sodium chloride 0.9%, Pharmacy to dose Vancomycin consult **AND** vancomycin - pharmacy to dose    Review of patient's allergies indicates:   Allergen Reactions    Iodine containing multivitamin Swelling     itching    Keflex [cephalexin] Swelling     Eyes.  Tolerated multiple doses of zosyn and 1 dose of augmentin in 2015 and 2016, respectively    Peaches [peach (prunus persica)] Swelling     eyes    Shellfish containing products Swelling    Fig tree Swelling     itching    Tuberculin spenser test ppd Rash     Objective:     Vital Signs (Most Recent):  Temp: 98 °F (36.7 °C) (09/20/22 1211)  Pulse: 70 (09/20/22 1211)  Resp: 18 (09/20/22 1211)  BP: 121/60 (09/20/22 1211)  SpO2: 97 % (09/20/22 1211) Vital Signs (24h Range):  Temp:  [97.6 °F (36.4 °C)-98.8 °F (37.1 °C)] 98 °F (36.7 °C)  Pulse:  [68-80] 70  Resp:  [16-20] 18  SpO2:  [93 %-98 %] 97 %  BP: (113-131)/(54-71) 121/60     Patient Vitals for the past 72 hrs (Last 3 readings):   Weight   09/20/22 0500 98.2 kg (216 lb 7.9 oz)   09/19/22 0448 96.7 kg (213 lb 3 oz)   09/18/22 0458 96.8 kg (213 lb 6.5 oz)     Body mass index is 33.91 kg/m².      Intake/Output Summary (Last 24 hours) at 9/20/2022 1716  Last data filed at 9/20/2022 0600  Gross per 24 hour   Intake 240 ml   Output 900 ml   Net -660 ml        Hemodynamic Parameters:       Telemetry: no events on tele    Physical Exam  Physical Exam  Constitutional:       Appearance: Normal appearance. He is obese.   HENT:      Head: Atraumatic.   Eyes:      Conjunctiva/sclera: Conjunctivae normal.   Cardiovascular:      Rate and Rhythm: Normal rate and regular rhythm.   Pulmonary:      Effort: Pulmonary effort is normal.      Breath sounds: No wheezing or rhonchi.   Abdominal:      General: There is distension.      Palpations: Abdomen is soft. There is no mass.      Tenderness: There is no abdominal tenderness.   Musculoskeletal:      Right lower leg: No edema.      Left lower leg: No edema.   Skin:     General: Skin is warm.      Neurological:      Mental Status: He is alert        Significant Labs:  CBC:  Recent Labs   Lab 09/18/22  0728 09/19/22  0316 09/20/22  0508   WBC 4.65 4.31 4.80   RBC 3.94* 4.01* 4.01*   HGB 11.2* 11.1* 11.1*   HCT 34.4* 33.8* 34.2*   * 168 203   MCV 87 84 85   MCH 28.4 27.7 27.7   MCHC 32.6 32.8 32.5     BNP:  Recent Labs   Lab 09/14/22  0537   *     CMP:  Recent Labs   Lab 09/18/22  0728 09/19/22  0316 09/20/22  0508    111* 97   CALCIUM 8.4* 8.3* 8.5*   ALBUMIN 2.7* 2.7* 2.8*   PROT 6.0 5.8* 6.0   * 135* 134*   K 4.0 3.9 4.0   CO2 25 24 25    103 103   BUN 44* 40* 33*   CREATININE 1.4 1.4 1.3   ALKPHOS 67 79 77   ALT 30 27 24   AST 36 29 22   BILITOT 0.7 0.4 0.4      Coagulation:   No results for input(s): PT, INR, APTT in the last 168 hours.  LDH:  No results for input(s): LDH in the last 72 hours.  Microbiology:  Microbiology Results (last 7 days)       Procedure Component Value Units Date/Time    Blood culture [[id number]] Collected: 09/18/22 1117    Order Status: Completed Specimen: Blood from Peripheral, Right Hand Updated: 09/20/22 1412     Blood Culture, Routine No Growth to date      No Growth to date      No Growth to date    Blood culture [[id number]] Collected: 09/18/22 1105    Order Status:  Completed Specimen: Blood from Antecubital, Right Arm Updated: 09/20/22 1212     Blood Culture, Routine No Growth to date      No Growth to date      No Growth to date    Blood culture [[id number]]  (Abnormal) Collected: 09/16/22 0443    Order Status: Completed Specimen: Blood Updated: 09/19/22 0951     Blood Culture, Routine Gram stain aer bottle: Gram positive cocci in clusters resembling Staph      Positive results previously called 09/17/2022  05:34      METHICILLIN RESISTANT STAPHYLOCOCCUS AUREUS  ID consult required at [facility].  For susceptibility see order #[id number]      Narrative:      Lft wrist    Blood culture [[id number]]  (Abnormal)  (Susceptibility) Collected: 09/14/22 1302    Order Status: Completed Specimen: Blood from Wrist, Left Updated: 09/19/22 0734     Blood Culture, Routine Gram stain robert bottle: Gram positive cocci      Positive results previously called 09/15/2022  02:59      METHICILLIN RESISTANT STAPHYLOCOCCUS AUREUS  ID consult required at [facility].       Comment: Previous comment was modified by KRISTINA at 09:12 on 09/17/2022 ID   consult required at [facility].         Blood culture [[id number]]  (Abnormal) Collected: 09/16/22 0443    Order Status: Completed Specimen: Blood Updated: 09/18/22 0748     Blood Culture, Routine Gram stain aer bottle: Gram positive cocci in clusters resembling Staph      Results called to and read back by:Estrellita Eric RN 09/16/2022  22:55      METHICILLIN RESISTANT STAPHYLOCOCCUS AUREUS  ID consult required at [facility].  For susceptibility see order #[id number]      Narrative:      Rt hand    Blood culture [[id number]]  (Abnormal) Collected: 09/15/22 1142    Order Status: Completed Specimen: Blood Updated: 09/18/22 0748     Blood Culture, Routine Gram stain aer bottle: Gram positive cocci in clusters resembling Staph      Positive results  previously called 09/16/2022  05:41      METHICILLIN RESISTANT STAPHYLOCOCCUS AUREUS  ID consult required at [facility].  For susceptibility see order #[id number]      Narrative:      Rt AC    Blood culture [[id number]]  (Abnormal) Collected: 09/15/22 1142    Order Status: Completed Specimen: Blood Updated: 09/18/22 0747     Blood Culture, Routine Gram stain aer bottle: Gram positive cocci in clusters resembling Staph      Results called to and read back by:Estrellita Eric RN  09/16/2022  05:03      METHICILLIN RESISTANT STAPHYLOCOCCUS AUREUS  ID consult required at [facility].  For susceptibility see order #[id number]      Narrative:      Rt wrist    Blood culture [[id number]]  (Abnormal) Collected: 09/14/22 1259    Order Status: Completed Specimen: Blood from Peripheral, Antecubital, Left Updated: 09/17/22 1005     Blood Culture, Routine Gram stain robert bottle: Gram positive cocci in clusters resembling Staph      Results called to and read back by: Gilbert Castle RN,  09/15/2022  00:50      Gram stain aer bottle: Gram positive cocci in clusters resembling Staph      Positive results previously called 09/15/2022  03:06      METHICILLIN RESISTANT STAPHYLOCOCCUS AUREUS  ID consult required at [facility].  For susceptibility see order #[id number]      Blood culture [[id number]]     Order Status: Canceled Specimen: Blood     Blood culture [[id number]]     Order Status: Canceled Specimen: Blood     MRSA/SA Rapid ID by PCR from Blood culture [[id number]]  (Abnormal) Collected: 09/15/22 0050    Order Status: Completed Updated: 09/15/22 0156     Staph aureus ID by PCR Positive     MRSA ID by PCR Positive    Influenza A & B by Molecular [[id number]] Collected: 09/14/22 0632    Order Status: Completed Specimen: Nasopharyngeal Swab Updated: 09/14/22 0725     Influenza A, Molecular Negative     Influenza B, Molecular Negative     Flu A & B Source  Nasal swab                Estimated Creatinine Clearance: 59 mL/min (based on SCr of 1.3 mg/dL).    Diagnostic Results:

## 2022-09-21 NOTE — SUBJECTIVE & OBJECTIVE
Interval History: No acute issues overnight. Complaining of lt upper arm pain proximal to the midline insertion site. Will get u/s. Appears slightly vol overloaded( net positive 1.7 l). Will resume home po diuretics.    Continuous Infusions:   DOBUTamine IV infusion (non-titrating) 2.5 mcg/kg/min (09/20/22 1800)     Scheduled Meds:   acetaminophen  650 mg Oral QID    allopurinoL  200 mg Oral Daily    amiodarone  300 mg Oral Daily    aspirin  81 mg Oral Daily    atorvastatin  80 mg Oral Daily    enoxaparin  40 mg Subcutaneous Daily    LIDOcaine  1 patch Transdermal Q24H    sacubitriL-valsartan  1 tablet Oral BID    vancomycin (VANCOCIN) IVPB  1,250 mg Intravenous Q24H     PRN Meds:HYDROcodone-acetaminophen, methocarbamoL, ondansetron, senna-docusate 8.6-50 mg, sodium chloride 0.9%, Pharmacy to dose Vancomycin consult **AND** vancomycin - pharmacy to dose    Review of patient's allergies indicates:   Allergen Reactions    Iodine containing multivitamin Swelling     itching    Keflex [cephalexin] Swelling     Eyes.  Tolerated multiple doses of zosyn and 1 dose of augmentin in 2015 and 2016, respectively    Peaches [peach (prunus persica)] Swelling     eyes    Shellfish containing products Swelling    Fig tree Swelling     itching    Tuberculin spenser test ppd Rash     Objective:     Vital Signs (Most Recent):  Temp: 97.8 °F (36.6 °C) (09/21/22 0754)  Pulse: 73 (09/21/22 1122)  Resp: 18 (09/21/22 0943)  BP: (!) 122/58 (09/21/22 0754)  SpO2: 97 % (09/21/22 0754)   Vital Signs (24h Range):  Temp:  [97.8 °F (36.6 °C)-98.2 °F (36.8 °C)] 97.8 °F (36.6 °C)  Pulse:  [70-83] 73  Resp:  [16-20] 18  SpO2:  [95 %-97 %] 97 %  BP: (116-129)/(56-80) 122/58     Patient Vitals for the past 72 hrs (Last 3 readings):   Weight   09/21/22 0500 97.8 kg (215 lb 9.8 oz)   09/20/22 0500 98.2 kg (216 lb 7.9 oz)   09/19/22 0448 96.7 kg (213 lb 3 oz)     Body mass index is 33.77 kg/m².      Intake/Output Summary (Last 24 hours) at 9/21/2022  1155  Last data filed at 9/20/2022 1800  Gross per 24 hour   Intake 1788.95 ml   Output --   Net 1788.95 ml       Hemodynamic Parameters:       Telemetry: no events on tele    Physical Exam  Physical Exam  Constitutional:       Appearance: Normal appearance. He is not ill-appearing.   Cardiovascular:      Rate and Rhythm: Normal rate and regular rhythm.      Pulses: Normal pulses.      Heart sounds: Normal heart sounds.   Elevated JVD.  Pulmonary:      Effort: Pulmonary effort is normal.      Breath sounds: Normal breath sounds.   Abdominal:      General: Abdomen is flat. Bowel sounds are normal. There is distension.      Palpations: Abdomen is soft.      Tenderness: There is abdominal tenderness.   Musculoskeletal:         General: No swelling or tenderness.  1+ pitting edema in b/l LE upto knee.  Skin:     General: Skin is warm and dry.      Findings: Erythema present.   Neurological:      Mental Status: He is alert and oriented to person, place, and time. Mental status is at baseline.   Psychiatric:         Behavior: Behavior normal.         Thought Content: Thought content normal.       Significant Labs:  CBC:  Recent Labs   Lab 09/19/22  0316 09/20/22  0508 09/21/22  0539   WBC 4.31 4.80 5.91   RBC 4.01* 4.01* 3.97*   HGB 11.1* 11.1* 11.3*   HCT 33.8* 34.2* 33.6*    203 191   MCV 84 85 85   MCH 27.7 27.7 28.5   MCHC 32.8 32.5 33.6     BNP:  No results for input(s): BNP in the last 168 hours.    Invalid input(s): BNPTRIAGELBLO  CMP:  Recent Labs   Lab 09/19/22  0316 09/20/22  0508 09/21/22  0539   * 97 98   CALCIUM 8.3* 8.5* 8.5*   ALBUMIN 2.7* 2.8* 2.7*   PROT 5.8* 6.0 5.8*   * 134* 139   K 3.9 4.0 4.4   CO2 24 25 24    103 109   BUN 40* 33* 25*   CREATININE 1.4 1.3 1.2   ALKPHOS 79 77 73   ALT 27 24 20   AST 29 22 19   BILITOT 0.4 0.4 0.5      Coagulation:   No results for input(s): PT, INR, APTT in the last 168 hours.  LDH:  No results for input(s): LDH in the last 72  hours.  Microbiology:  Microbiology Results (last 7 days)       Procedure Component Value Units Date/Time    Blood culture [[id number]] Collected: 09/18/22 1117    Order Status: Completed Specimen: Blood from Peripheral, Right Hand Updated: 09/20/22 1412     Blood Culture, Routine No Growth to date      No Growth to date      No Growth to date    Blood culture [[id number]] Collected: 09/18/22 1105    Order Status: Completed Specimen: Blood from Antecubital, Right Arm Updated: 09/20/22 1212     Blood Culture, Routine No Growth to date      No Growth to date      No Growth to date    Blood culture [[id number]]  (Abnormal) Collected: 09/16/22 0443    Order Status: Completed Specimen: Blood Updated: 09/19/22 0951     Blood Culture, Routine Gram stain aer bottle: Gram positive cocci in clusters resembling Staph      Positive results previously called 09/17/2022  05:34      METHICILLIN RESISTANT STAPHYLOCOCCUS AUREUS  ID consult required at [facility].  For susceptibility see order #[id number]      Narrative:      Lft wrist    Blood culture [[id number]]  (Abnormal)  (Susceptibility) Collected: 09/14/22 1302    Order Status: Completed Specimen: Blood from Wrist, Left Updated: 09/19/22 0734     Blood Culture, Routine Gram stain robert bottle: Gram positive cocci      Positive results previously called 09/15/2022  02:59      METHICILLIN RESISTANT STAPHYLOCOCCUS AUREUS  ID consult required at [facility].       Comment: Previous comment was modified by KRISTINA at 09:12 on 09/17/2022 ID   consult required at [facility].         Blood culture [[id number]]  (Abnormal) Collected: 09/16/22 0443    Order Status: Completed Specimen: Blood Updated: 09/18/22 0748     Blood Culture, Routine Gram stain aer bottle: Gram positive cocci in clusters resembling Staph      Results called to and read back by:Estrellita Eric RN 09/16/2022  22:55      METHICILLIN  RESISTANT STAPHYLOCOCCUS AUREUS  ID consult required at [facility].  For susceptibility see order #[id number]      Narrative:      Rt hand    Blood culture [[id number]]  (Abnormal) Collected: 09/15/22 1142    Order Status: Completed Specimen: Blood Updated: 09/18/22 0748     Blood Culture, Routine Gram stain aer bottle: Gram positive cocci in clusters resembling Staph      Positive results previously called 09/16/2022  05:41      METHICILLIN RESISTANT STAPHYLOCOCCUS AUREUS  ID consult required at [facility].  For susceptibility see order #[id number]      Narrative:      Rt AC    Blood culture [[id number]]  (Abnormal) Collected: 09/15/22 1142    Order Status: Completed Specimen: Blood Updated: 09/18/22 0747     Blood Culture, Routine Gram stain aer bottle: Gram positive cocci in clusters resembling Staph      Results called to and read back by:Estrellita Eric RN  09/16/2022  05:03      METHICILLIN RESISTANT STAPHYLOCOCCUS AUREUS  ID consult required at [facility].  For susceptibility see order #[id number]      Narrative:      Rt wrist    Blood culture [[id number]]  (Abnormal) Collected: 09/14/22 1259    Order Status: Completed Specimen: Blood from Peripheral, Antecubital, Left Updated: 09/17/22 1005     Blood Culture, Routine Gram stain robert bottle: Gram positive cocci in clusters resembling Staph      Results called to and read back by: Gilbert Castle RN,  09/15/2022  00:50      Gram stain aer bottle: Gram positive cocci in clusters resembling Staph      Positive results previously called 09/15/2022  03:06      METHICILLIN RESISTANT STAPHYLOCOCCUS AUREUS  ID consult required at [facility].  For susceptibility see order #[id number]      Blood culture [[id number]]     Order Status: Canceled Specimen: Blood     Blood culture [[id number]]     Order Status: Canceled Specimen: Blood     MRSA/SA Rapid ID by PCR  from Blood culture [[id number]]  (Abnormal) Collected: 09/15/22 0050    Order Status: Completed Updated: 09/15/22 0156     Staph aureus ID by PCR Positive     MRSA ID by PCR Positive                Estimated Creatinine Clearance: 63.8 mL/min (based on SCr of 1.2 mg/dL).    Diagnostic Results:

## 2022-09-21 NOTE — PROGRESS NOTES
Clinical Cardiac Electrophysiology Follow-Up Note     Date:  9/21/2022  Requesting attending:  Sree Perez MD    Chief Complaint/Reason for Consultation:    Patient with MRSA bacteremia. EP consulted for device extraction     Problem List:   72 y.o.male with:     1. 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 Teofilo Moore , and upgrade to a [employer] CRT-D in 5/2019 by Dr. Teofilo Moore.   2. H/O ventricular tachycardia on amiodarone 300 mg daily  3. LBBB  4. CAD s/p STEMI s/p PCI LAD 2007  5. provoked PE/DVT in 2011  6. Former smoker    24 Hr Events and Subjective:   No events  Telemetry: V paced rhythm    Scheduled Meds:       acetaminophen  650 mg Oral QID    allopurinoL  200 mg Oral Daily    amiodarone  300 mg Oral Daily    aspirin  81 mg Oral Daily    atorvastatin  80 mg Oral Daily    enoxaparin  40 mg Subcutaneous Daily    LIDOcaine  1 patch Transdermal Q24H    sacubitriL-valsartan  1 tablet Oral BID    vancomycin (VANCOCIN) IVPB  1,250 mg Intravenous Q24H       Continuous Infusions:      DOBUTamine IV infusion (non-titrating) 2.5 mcg/kg/min (09/20/22 1800)     PRN Meds:   HYDROcodone-acetaminophen, methocarbamoL, ondansetron, senna-docusate 8.6-50 mg, sodium chloride 0.9%, Pharmacy to dose Vancomycin consult **AND** vancomycin - pharmacy to dose    Allergies:     Review of patient's allergies indicates:   Allergen Reactions    Iodine containing multivitamin Swelling     itching    Keflex [cephalexin] Swelling     Eyes.  Tolerated multiple doses of zosyn and 1 dose of augmentin in 2015 and 2016, respectively    Peaches [peach (prunus persica)] Swelling     eyes    Shellfish containing products Swelling    Fig tree Swelling     itching    Tuberculin spenser test ppd Rash     Physical Exam:     Vitals:    09/21/22 0943   BP:    Pulse:    Resp: 18   Temp:      Eyes: Sclerae anicteric. Ears/nose/throat: Mucous membranes moist. Neck: No thyromegaly,  lymphadenopathy, or jugular venous distention. Respiratory: Lungs clear to auscultation bilaterally. Cardiovascular: Heart was regular with normal first and second heart sounds. No S3 or S4. GI: Soft, nontender, nondistended, with normal bowel sounds. Musculoskeletal: extremities without cyanosis, clubbing or pitting edema. Neurologic: Patient is alert and oriented x3 and there is no focal strength deficit. Skin: no rashes, cuts, sores. Psychiatric: normal affect. Hematologic: no abnormal bruising or bleeding.    Laboratory Data:      BUN/Cr/glu/ALT/AST/amyl/lip:  25/1.2/--/20/19/--/-- (09/21 0539)  WBC/Hgb/Hct/Plts:  5.91/11.3/33.6/191 (09/21 0539)     Protein Creatinine Ratios:    Creatinine, Urine   Date Value Ref Range Status   03/30/2022 101.0 23.0 - 375.0 mg/dL Final   11/08/2015 80.0 23.0 - 375.0 mg/dL Final     Comment:     The random urine reference ranges provided were established   for 24 hour urine collections.  No reference ranges exist for  random urine specimens.  Correlate clinically.     01/19/2011 40 23 - 375 mg/dl Final     Protein, Urine Random   Date Value Ref Range Status   11/08/2015 20 (H) 0 - 15 mg/dL Final     Comment:     The random urine reference ranges provided were established   for 24 hour urine collections.  No reference ranges exist for  random urine specimens.  Correlate clinically.       Prot/Creat Ratio, Urine   Date Value Ref Range Status   11/08/2015 0.25 (H) 0.00 - 0.20 Final       Impression:   72 y.o. male with:    Plan:   Pending HARRY  Patient with no history of CABG or sternal incision, will need CTS back-up for this case. Will plan for early next week depending on HARRY results.  Can consult CTS after HARRY results   Continue antibiotics per ID recs    Current plan of care discussed with the patient, all questions answered.   Current plan of care discussed with primary service.    Ramu Macedo   [facility]  PGY4 Cardiology Fellow

## 2022-09-21 NOTE — TRANSFER OF CARE
"Anesthesia Transfer of Care Note    Patient: Audrey Oneil Jr.    Procedure(s) Performed: Procedure(s) (LRB):  ECHOCARDIOGRAM,TRANSESOPHAGEAL (N/A)    Patient location: PACU    Anesthesia Type: MAC    Transport from OR: Transported from OR on 2-3 L/min O2 by NC with adequate spontaneous ventilation    Post pain: adequate analgesia    Post assessment: no apparent anesthetic complications and tolerated procedure well    Post vital signs: stable    Level of consciousness: alert, awake and oriented    Nausea/Vomiting: no nausea/vomiting    Complications: none    Transfer of care protocol was followed      Last vitals:   Visit Vitals  BP (!) 93/51   Pulse 73   Temp 36.7 °C (98.1 °F) (Temporal)   Resp 18   Ht 5' 7" (1.702 m)   Wt 97.5 kg (215 lb)   SpO2 97%   BMI 33.67 kg/m²     "

## 2022-09-21 NOTE — NURSING TRANSFER
Nursing Transfer Note      9/21/2022     Reason patient is being transferred: post-procedure    Transfer To: [id number]    Transfer via bed    Transfer with cardiac monitoring    Transported by transport    Medicines sent: DBT gtt    Any special needs or follow-up needed: routine    Chart send with patient: Yes    Notified: spouse, sister    Patient reassessed at: 1715, 9/21

## 2022-09-21 NOTE — PLAN OF CARE
Problem: Physical Therapy  Goal: Physical Therapy Goal  Description: Goals to be met by: 10/5/22    Patient will increase functional independence with mobility by performin. Sit to stand transfer with independence and maintaining sternal precautions  2. Gait  x 300 feet with independence using LRAD as needed  3. Ascend/descend 5 stair with bilateral handrails modified independence and maintaining sternal precautions using LRAD as needed  4. Lower extremity exercise program x15 reps per handout, with independence    Outcome: Ongoing, Progressing     Pt tolerated PT session well.      All needs met, all questions answered.

## 2022-09-21 NOTE — ASSESSMENT & PLAN NOTE
Septicemia on admission due to MRSA bacteremia. Fever resolved and without leukocytosis. Blood cultures 9/14-9/16 remain positive for MRSA. Repeat blood cxrs collected (09/18): NGTD.  · Continue IV-Vancomycin; with continued monitoring for  Therapeutic Vanc trough (14.3 on (09/19) ; if signs of worsening renal function or unable to achieve therapeutic levels (15-20), or significant adverse drug reactions-- recommend switching to Daptomycin.  · PharmD service to manage vancomycin dosing and drug monitoring; goal trough 15-20.  · Recommend abx treatment duration of 6wks s/p ICD-removal or negative blood cxrs--whichever occurs later. Repeat blcx from 9/18 so far ngtd.  · EP consulted for device extraction given bacteremia with MRSA; pt scheduled to undergo HARRY before intervention.  · Will continue to follow pt

## 2022-09-21 NOTE — PROGRESS NOTES
Update  LCSW went to check in on Pt who was not in room, will check in on Pt tomorrow. SW providing ongoing psychosocial, counseling, & emotional support, education, resources, assistance, and discharge planning as indicated.  SW to continue to follow.

## 2022-09-21 NOTE — ASSESSMENT & PLAN NOTE
Erythema, edema, tenderness and warmth of the both right and left forearm. Patient reports prior IV sites.  · Continue vancomycin.   · Ice and elevation.   · Consider ultrasounds to assess for phlebitis.   · If unable to get therapeutic vancomycin levels and ongoing concern for vancomycin related adverse events then will consider transitioning to other antistaphylococcal agent (I.e. Dapto).

## 2022-09-21 NOTE — PLAN OF CARE
Problem: Occupational Therapy  Goal: Occupational Therapy Goal  Outcome: Met     Once medically stable, pt safe to dc home with no further OT needs anticipated.

## 2022-09-21 NOTE — SUBJECTIVE & OBJECTIVE
Interval History: Patient afebrile, without leukocytosis. Erythematous macular rash at forearms B/L improved. CrCL: 63.8 mL/min (stable, with continued improvement). First vanc done yesterday (09/19) trough (14.3).     Review of Systems   Constitutional:  Positive for appetite change and fatigue. Negative for chills, diaphoresis and fever.   Respiratory:  Positive for shortness of breath.    Cardiovascular: Negative.    Gastrointestinal:  Positive for abdominal distention, abdominal pain and nausea. Negative for constipation and vomiting.   Genitourinary: Negative.    Musculoskeletal:  Positive for myalgias.   Skin:  Positive for rash.        Right forearm, painful, erythematous rash   Neurological:  Negative for headaches.   Psychiatric/Behavioral:  Negative for confusion.    Objective:     Vital Signs (Most Recent):  Temp: 98.1 °F (36.7 °C) (09/21/22 1712)  Pulse: 73 (09/21/22 1745)  Resp: 16 (09/21/22 1745)  BP: (!) 96/54 (09/21/22 1745)  SpO2: 96 % (09/21/22 1745)   Vital Signs (24h Range):  Temp:  [97.8 °F (36.6 °C)-98.2 °F (36.8 °C)] 98.1 °F (36.7 °C)  Pulse:  [70-83] 73  Resp:  [14-20] 16  SpO2:  [95 %-100 %] 96 %  BP: ()/(51-80) 96/54     Weight: 97.5 kg (215 lb)  Body mass index is 33.67 kg/m².    Estimated Creatinine Clearance: 63.8 mL/min (based on SCr of 1.2 mg/dL).    Physical Exam  Constitutional:       Appearance: Normal appearance. He is not ill-appearing.   Cardiovascular:      Rate and Rhythm: Normal rate and regular rhythm.      Pulses: Normal pulses.      Heart sounds: Normal heart sounds.   Pulmonary:      Effort: Pulmonary effort is normal.      Breath sounds: Normal breath sounds.   Abdominal:      General: Abdomen is flat. Bowel sounds are normal. There is distension.      Palpations: Abdomen is soft.      Tenderness: There is abdominal tenderness.   Musculoskeletal:         General: No swelling or tenderness.   Skin:     General: Skin is warm and dry.      Findings: Erythema present.    Neurological:      Mental Status: He is alert and oriented to person, place, and time. Mental status is at baseline.   Psychiatric:         Behavior: Behavior normal.         Thought Content: Thought content normal.       Significant Labs: All pertinent labs within the past 24 hours have been reviewed.    Significant Imaging: I have reviewed all pertinent imaging results/findings within the past 24 hours.

## 2022-09-21 NOTE — PT/OT/SLP EVAL
"Physical Therapy Co-Evaluation and Treatment    Patient Name:  Audrey Oneil Jr.   MRN:  [id number]    Recommendations:     Discharge Recommendations:  home   Discharge Equipment Recommendations: none   Barriers to discharge: decreased functional mobility and fall risk    Assessment:     Audrey Oneil Jr. is a 72 y.o. male admitted with a medical diagnosis of Chronic combined systolic and diastolic congestive heart failure.  Pt demonstrates the below listed impairments with decreased tolerance to functional mobility and impaired endurance being the most limiting.  Pt demonstrates fair tolerance to out of bed mobility and is willing to ambulate out in the hallway.  Pt noted to have some SOB however there are no complaints of lightheadedness, states he is close to his baseline.  Educated on sternal precautions.  Pt requires skilled PT due to patient's status as: a fall risk to allow safe d/c home.     Impairments and functional limitations:  weakness, impaired endurance, impaired functional mobility, gait instability, impaired cardiopulmonary response to activity.  These deficits affect their roles and responsibilities in which they were able to complete prior to admit.  Rehab Prognosis:   Good ; patient would benefit from acute skilled PT services 2 x/week to address these deficits, improve quality of life, focus on recovery of impairments, provide patient/caregiver education, reduce fall risk, and reach maximum level of function.  Pt is highly  motivated to participated in skilled PT.    Recent Surgery:   Procedure(s) (LRB):  ECHOCARDIOGRAM,TRANSESOPHAGEAL (N/A)      Plan:     During this hospitalization, patient to be seen 2 x/week to address the identified rehab impairments via gait training, therapeutic activities, therapeutic exercises, neuromuscular re-education and progress toward the following goals:    Plan of Care Expires:  10/21/22    Subjective     Chief Complaint: "I can't do the things I want to do " "because of all this"  Patient/Family Comments/Goals: Return home  Pain/Comfort:  Pain Rating 1: 8/10  Location - Side 1: Left  Location - Orientation 1: generalized  Location 1: arm  Pain Addressed 1: Distraction, Pre-medicate for activity  Pain Rating Post-Intervention 1: other (see comments) (not rated)    Patients cultural, spiritual, Mu-ism conflicts given the current situation: no    Living Environment:  Patient lives with their sister or SO  in single story home or trailer, 4-5 steps with Bilateral hand rail at wither location, tub/shower at both.   Pt utilizes No AD for ambulation of all distances.  States he was limited to ~6min long walks before being out of breath  Prior to admission, patient was independent with ADLs.   DME owned: bath bench.   DME not currently used: n/a.   Upon discharge, patient will have assistance from family with no 24/7 assist.     Objective:     Communicated with nursing prior to session.  Patient found HOB elevated with PICC line, telemetry  upon PT entry to room.    General Precautions: Standard, contact, fall   Orthopedic Precautions:N/A   Braces: N/A   Oxygen Device:      Exams:  Cognitive Exam:  Patient is oriented to Person, Place, Time, and Situation  Command following: Patient follows 100% of commands   RLE ROM: WFL  RLE Strength: WFL  LLE ROM: WFL  LLE Strength: WFL  Postural Exam:  Patient presented with the following abnormalities:    -       Rounded shoulders  -       Forward head    Functional Mobility:  Bed Mobility:  Rolling Left: MOD I  Scooting: MOD I  Supine to Sit: MOD I  Head of bed position: HOB elevated    Transfers:  Sit to Stand: IND with No AD and with cues for hand placement  Pt did push with hands this day     Gait: Patient ambulated ~300' with No AD and SBA. Patient demonstrates steady gait and decreased nidia. All lines remained intact throughout ambulation trial, gait belt utilized, mask donned for out of room ambulation.  Some SOB     Balance: "   Position Score Time   Static Sitting GOOD-: Takes MODERATE challenges but inconstantly  n/a   Dynamic Sitting GOOD-: Maintains balance through MODERATE excursions of active trunk motion, but inconstantly  n/a   Static Standing FAIR+: Takes MINIMAL challenges n/a   Dynamic Standing FAIR+: Maintains balance through MINIMAL excursions of active trunk motion n/a       Therapeutic Activities:  Patient educated on role of acute care PT and PT POC, safety while in hospital including calling nurse for mobility, call light usage, benefits of out of bed mobility, home exercise program , breathing technique, fall risk, bed mobility , transfers, gait technique, positioning, posture, risks of prolonged bed rest, possible discharge disposition , sternal precautions, and benefits of continued PT by explanation and demonstration.    Patient demonstrates good understanding of education provided this day.   Whiteboard updated    Therapeutic Exercises:  n/a    AM-PAC 6 CLICK MOBILITY  Total Score:21     Patient left up in chair with all lines intact, call button in reach, and RN notified.    GOALS:   Multidisciplinary Problems       Physical Therapy Goals          Problem: Physical Therapy    Goal Priority Disciplines Outcome Goal Variances Interventions   Physical Therapy Goal     PT, PT/OT Ongoing, Progressing     Description: Goals to be met by: 10/5/22    Patient will increase functional independence with mobility by performin. Sit to stand transfer with independence and maintaining sternal precautions  2. Gait  x 300 feet with independence using LRAD as needed  3. Ascend/descend 5 stair with bilateral handrails modified independence and maintaining sternal precautions using LRAD as needed  4. Lower extremity exercise program x15 reps per handout, with independence                         History:     Past Medical History:   Diagnosis Date    Acute hypoxemic respiratory failure 2015    Acute idiopathic gout of left  knee 12/2/2019    Acute idiopathic gout of right elbow 9/23/2021    AICD (automatic cardioverter/defibrillator) present 12/13/2015      Anticoagulant long-term use     Bronchopneumonia 12/20/2021    CHF (congestive heart failure)     Chronic anticoagulation 5/5/2016    Chronic combined systolic and diastolic heart failure 11/26/2012    EF 10-20% on ECHO 2013    Chronic gout 12/2/2019    Clotting disorder     Coronary artery disease involving native coronary artery of native heart without angina pectoris 11/26/2012    Cath 10- Stents patent non-obstructive disease Cath 11-12015 non-obstructive disease     COVID-19 08/2021    Had antibody infusion    Diverticulosis of colon     DVT (deep venous thrombosis), unspecified laterality 11/12/2015    Dysphonia 1/28/2018    Essential hypertension 11/15/2015    Fine motor impairment 2/2/2021    Hyperlipidemia     Hypertensive heart disease with heart failure 5/5/2016    MI (myocardial infarction) 2009    x's 5    Nicotine abuse     Obesity 11/26/2012    Olecranon bursitis of right elbow 9/19/2021    Pulmonary embolism 2011    Renal disorder     LAVERNE    Right carpal tunnel syndrome 4/6/2018    Stented coronary artery 11/26/2012    LAD stent placed 10/17/2007     Trigger thumb of right hand 4/6/2018       Past Surgical History:   Procedure Laterality Date    APPENDECTOMY      BACK SURGERY      CARDIAC DEFIBRILLATOR PLACEMENT      CARDIAC SURGERY  2007    stent    CARPAL TUNNEL RELEASE Right 04/2018    COLONOSCOPY N/A 8/8/2022    Procedure: COLONOSCOPY;  Surgeon: Sascha Keenan MD;  Location: [facility] ENDO ([address] [place] [address]);  Service: Endoscopy;  Laterality: N/A;  EF-15%  pre heart    AICD-St Rodri       COVID test [facility] 8/5-inst mail-am prep-clears 4 hrs prior-tb    INSERTION OF BIVENTRICULAR IMPLANTABLE CARDIOVERTER-DEFIBRILLATOR (ICD) N/A 5/3/2019    Procedure: INSERTION, ICD, BIVENTRICULAR;  Surgeon: Teofilo Pal MD;  Location: [facility] EP LAB;  Service: Cardiology;   Laterality: N/A;  ICH CM,  ICD UPGD BI-V,  SJM, MAC, FAS, 3PREP (dual ICD INSITU)    r knee scope      REVISION OF SKIN POCKET FOR CARDIOVERTER-DEFIBRILLATOR Left 5/3/2019    Procedure: Revision, Skin Pocket, For Cardioverter-Defibrillator;  Surgeon: Teofilo Pal MD;  Location: [facility] EP LAB;  Service: Cardiology;  Laterality: Left;    RIGHT HEART CATHETERIZATION Right 6/14/2021    Procedure: INSERTION, CATHETER, RIGHT HEART;  Surgeon: Lizz Nieto MD;  Location: [facility] CATH LAB;  Service: Cardiology;  Laterality: Right;    RIGHT HEART CATHETERIZATION Right 6/17/2022    Procedure: INSERTION, CATHETER, RIGHT HEART;  Surgeon: Migue Henry Jr., MD;  Location: [facility] CATH LAB;  Service: Cardiology;  Laterality: Right;    SPINE SURGERY      TONSILLECTOMY      TRIGGER FINGER RELEASE Right 04/2018    thumb       Time Tracking:     PT Received On: 09/21/22  PT Start Time: 0950     PT Stop Time: 1009  PT Total Time (min): 19 min     Billable Minutes: Evaluation 10 and Gait Training 9    09/21/2022    Co-treatment performed for this visit due to patient need for two skilled therapists to ensure patient and staff safety, to accommodate for patient activity tolerance/pain management, and maximize functional potential.

## 2022-09-21 NOTE — PT/OT/SLP EVAL
"Occupational Therapy   Evaluation and Discharge Note    Name: Audrey Oneil Jr.  MRN: [id number]  Admitting Diagnosis:  Chronic combined systolic and diastolic congestive heart failure   Recent Surgery: Procedure(s) (LRB):  ECHOCARDIOGRAM,TRANSESOPHAGEAL (N/A) Day of Surgery    Recommendations:     Discharge Recommendations: home  Discharge Equipment Recommendations:  none  Barriers to discharge:  None    Assessment:     Audrey Oneil Jr. is a 72 y.o. male with a medical diagnosis of Chronic combined systolic and diastolic congestive heart failure. At this time, patient is functioning at their prior level of function and does not require further acute OT services.     Pt evaluated this date for potential advanced surgical options (LVAD vs OHS). Pt was able to verbally acknowledge sternal precautions and their application to functional tasks/ADLs.  Pt completed sit<>stand transfer from EOB without use of BUE w/ no for balance and v/c for technique.  Pt uses no DME.   Pt has no evident barriers to his recovery from proposed procedure and is safe to discharge HOME when medically stable.      Plan:     During this hospitalization, patient does not require further acute OT services.  Please re-consult if situation changes.    Plan of Care Reviewed with: patient    Subjective     Chief Complaint: "this arm is hurting me, i've been really limited in doing things I love, like scuba diving because of this" re: infection and PICC line  Patient/Family Comments/goals:  maximize return to PLOF    Occupational Profile:  Living Environment: pt lives at times with girlfriend in [facility] 4-5 [place] w B HR, TBS and at other times with family in [facility], 4-5 [place], B HR, TBS  Previous level of function: IND; endores requiring Mod I (additional time) to complete tasks, and takes rest breaks at times PRN 2* fatigue  Roles and Routines: retired , previously avid traveler and hiker. Enjoys being independent and active as able  Equipment " Used at home:  bath bench  Assistance upon Discharge: family, girlfriend    Pain/Comfort:  Pain Rating 1: 8/10  Location - Side 1: Left  Location - Orientation 1: generalized  Location 1: arm  Pain Addressed 1: Reposition, Distraction, Pre-medicate for activity  Pain Rating Post-Intervention 1: 8/10    Patients cultural, spiritual, Lutheran conflicts given the current situation: no    Objective:     Communicated with: RN prior to session.  Patient found HOB elevated with PICC line, telemetry upon OT entry to room.    General Precautions: Standard, contact, fall   Orthopedic Precautions:N/A   Braces: N/A  Respiratory Status: Room air     Occupational Performance:    Bed Mobility:    Patient completed Rolling/Turning to Left with  independence  Patient completed Scooting/Bridging with independence  Patient completed Supine to Sit with independence    Functional Mobility/Transfers:  Patient completed Sit <> Stand Transfer with independence  with  no assistive device   Functional Mobility: supervision, ambulating in roberts, household distance with increased time, slow gait.     Activities of Daily Living:  Feeding:  independence    Grooming: independence standing combing hair, LUE WFL despite painful  Upper Body Dressing: independence seated EOB  Lower Body Dressing: independence seated EOB donning pants and socks    Cognitive/Visual Perceptual:  AxOx4, vision and cognitive function WFL, patient cooperative and appropriate.   Pt with no glasses worn/present during eval.       Physical Exam:  BUE WFL for strength, sensation, GM/FM for use during functional tasks.  Pain limited AROM at shoulder, though range WFL and able to incorporate in ADLs WFL.  Pt is R handed.      AMPAC 6 Click ADL:  AMPAC Total Score: 24    Treatment & Education:  -OT POC, safety during ADLs and mobility - pt informed of dc from OT services, verbalized agreement.   -Education on energy conservation and task modification to maximize safety and  independence  -Questions answered within OT scope of practice.      Patient left ambulatory in room/roberts with all lines intact, Rn notified, and PT present    GOALS:   Multidisciplinary Problems       Occupational Therapy Goals       Not on file              Multidisciplinary Problems (Resolved)          Problem: Occupational Therapy    Goal Priority Disciplines Outcome Interventions   Occupational Therapy Goal   (Resolved)     OT, PT/OT Met                        History:     Past Medical History:   Diagnosis Date    Acute hypoxemic respiratory failure 11/7/2015    Acute idiopathic gout of left knee 12/2/2019    Acute idiopathic gout of right elbow 9/23/2021    AICD (automatic cardioverter/defibrillator) present 12/13/2015      Anticoagulant long-term use     Bronchopneumonia 12/20/2021    CHF (congestive heart failure)     Chronic anticoagulation 5/5/2016    Chronic combined systolic and diastolic heart failure 11/26/2012    EF 10-20% on ECHO 2013    Chronic gout 12/2/2019    Clotting disorder     Coronary artery disease involving native coronary artery of native heart without angina pectoris 11/26/2012    Cath 10- Stents patent non-obstructive disease Cath 11-12015 non-obstructive disease     COVID-19 08/2021    Had antibody infusion    Diverticulosis of colon     DVT (deep venous thrombosis), unspecified laterality 11/12/2015    Dysphonia 1/28/2018    Essential hypertension 11/15/2015    Fine motor impairment 2/2/2021    Hyperlipidemia     Hypertensive heart disease with heart failure 5/5/2016    MI (myocardial infarction) 2009    x's 5    Nicotine abuse     Obesity 11/26/2012    Olecranon bursitis of right elbow 9/19/2021    Pulmonary embolism 2011    Renal disorder     LAVERNE    Right carpal tunnel syndrome 4/6/2018    Stented coronary artery 11/26/2012    LAD stent placed 10/17/2007     Trigger thumb of right hand 4/6/2018         Past Surgical History:   Procedure Laterality Date     APPENDECTOMY      BACK SURGERY      CARDIAC DEFIBRILLATOR PLACEMENT      CARDIAC SURGERY  2007    stent    CARPAL TUNNEL RELEASE Right 04/2018    COLONOSCOPY N/A 8/8/2022    Procedure: COLONOSCOPY;  Surgeon: Sascha Keenan MD;  [facility]  Service: Endoscopy;  Laterality: N/A;  EF-15%  pre heart    AICD-St Rodri       COVID test [facility] 8/5-inst mail-am prep-clears 4 hrs prior-tb    INSERTION OF BIVENTRICULAR IMPLANTABLE CARDIOVERTER-DEFIBRILLATOR (ICD) N/A 5/3/2019    Procedure: INSERTION, ICD, BIVENTRICULAR;  Surgeon: Teofilo Pal MD;  Location: [facility] EP LAB;  Service: Cardiology;  Laterality: N/A;  ICH CM,  ICD UPGD BI-V,  SJM, MAC, FAS, 3PREP (dual ICD INSITU)    r knee scope      REVISION OF SKIN POCKET FOR CARDIOVERTER-DEFIBRILLATOR Left 5/3/2019    Procedure: Revision, Skin Pocket, For Cardioverter-Defibrillator;  Surgeon: Teofilo Pal MD;  Location: [facility] EP LAB;  Service: Cardiology;  Laterality: Left;    RIGHT HEART CATHETERIZATION Right 6/14/2021    Procedure: INSERTION, CATHETER, RIGHT HEART;  Surgeon: Lizz Nieto MD;  Location: [facility] CATH LAB;  Service: Cardiology;  Laterality: Right;    RIGHT HEART CATHETERIZATION Right 6/17/2022    Procedure: INSERTION, CATHETER, RIGHT HEART;  Surgeon: Migue Henry Jr., MD;  Location: [facility] CATH LAB;  Service: Cardiology;  Laterality: Right;    SPINE SURGERY      TONSILLECTOMY      TRIGGER FINGER RELEASE Right 04/2018    thumb       Time Tracking:     OT Date of Treatment: 09/21/22  OT Start Time: 0950  OT Stop Time: 1004  OT Total Time (min): 14 min    Billable Minutes:Evaluation 14    9/21/2022

## 2022-09-21 NOTE — PROGRESS NOTES
[facility] - Cardiology  Infectious Disease  Progress Note    Patient Name: Audrey Oneil Jr.  MRN: [id number]  Admission Date: 9/14/2022  Length of Stay: 7 days  Attending Physician: Sree Perez MD  Primary Care Provider: Primary Doctor No    Isolation Status: Contact  Assessment/Plan:      Cellulitis of left forearm  Erythema, edema, tenderness and warmth of the both right and left forearm. Patient reports prior IV sites.  · Continue vancomycin.   · Ice and elevation.   · Consider ultrasounds to assess for phlebitis.   · If unable to get therapeutic vancomycin levels and ongoing concern for vancomycin related adverse events then will consider transitioning to other antistaphylococcal agent (I.e. Dapto).    Septicemia due to methicillin resistant Staphylococcus aureus  Septicemia on admission due to MRSA bacteremia. Fever resolved and without leukocytosis. Blood cultures 9/14-9/16 positive for MRSA. Repeat blood cxrs collected (09/18): NGTD.  · Continue IV-Vancomycin; with continued monitoring for Therapeutic Vanc trough (14.3 on (09/19)  · If signs of worsening renal function or unable to achieve therapeutic levels (15-20), or significant adverse drug reactions-- recommend switching to Daptomycin.  · PharmD service to manage vancomycin dosing and drug monitoring; goal trough 15-20.  · Recommend abx treatment duration of 6wks s/p ICD-removal or negative blood cxrs--whichever occurs later  · EP consulted for device extraction given bacteremia with MRSA; pending HARRY (09/21), plan for potential device removal next week.  · Will continue to follow pt     Bacteremia due to methicillin resistant Staphylococcus aureus  Unclear source of bacteremia however potential sources include CVC which has been removed, and translocation from skin in the setting of multiple skin eschars.   · Management plan as detailed below, see septicemia-mrsa.          Thank you for your consult. I will follow-up with patient. Please contact us if  you have any additional questions.    Isiah Aragon PA-C  Infectious Disease  [facility] - [facility]iology    Subjective:     Principal Problem:Chronic combined systolic and diastolic congestive heart failure    HPI: HPI: Patient is a 71yo, male, with PMH of CAD s/p MI, chronic combined systolic and diastolic heart failure on home dobutamine (EF15%; NYHA-3), HTN/HLD, CKD 3, chronic back pain, cardiac resynch therapy defib (CRT-D), hx of recurrent Vtach and cardiogenic shock (01/2022) at [facility]; which prompted patient re-decision to pursue eval / consideration for LVAD and potential heart transplant recipient. Currently patient case being deferred for transplant recipient selection pending further of recently found pancreatic mass with advanced Imaging study to consider ICD in place; but pt is also still under consideration for DT-LVAD.  --Patient presented to ED (09/14) for new acute onset of central generalized abdominal pain with Right flank pain, headache, nausea, fever (102.2F on arrival), with tachycardia (up to 114bpm on arrival), abdominal distention, SOB requiring 4L home oxygen which began less than 12hrs after eating packaged spaghetti - reports being compliant with at-home medications.     Patient found to have MRSA-bacteremia w/ sepsis criteria on arrival (101.2F, 114bpm); unclear source of infection -- possibly eschar burn wound at left wrist (occurred 4-days ago) vs  PICC line less likley due to absence of signs of infected. Blood cxr x2 (09/14): speciated Staph aureus (ID / susc pending); but MRSA-ID PCR: Positive (09/15).   Patient started on IV-Vanc (2g). Fever reduced with anti-pyretic, stable at 99F, endorses sweats. Remains without leukocytosis.   (09/16) CrCL: 45.1mL/min, up trend from (CrCl:35) day prior 09/15.    Repeat blood cxrs x2 (09/15): GPC-cluster, resembling staph (ID / susc pending)  Repeat blood cxr x2 (09/16): sent --awaiting results.  TTE (09/14): negative for valve vegetations or  abscesses; showed left sided atrial and ventricular chamber abnormalities; EF10-15%, LV-diastolic dysfunction; mild mitral regurg.  09/14: Chest-xr: shows increasing pulmonary interstitial edema.  09/14: CT-AP (w/o contrast): No acute abdomen/pelvic abnormality to account for pt hx of pain. showed multiple stable indeterminate pancreatic hypoattenuating lesions. Sigmoid colonic diverticulosis without diverticulitis or other bowel inflammatory process. Cholelithiasis. Cardiomegaly and trace pericardial fluid.          Interval History: Patient afebrile, without leukocytosis. Erythematous macular rash at forearms B/L improved. CrCL: 63.8 mL/min (stable, with continued improvement). First vanc done yesterday (09/19) trough (14.3).     Review of Systems   Constitutional:  Positive for appetite change and fatigue. Negative for chills, diaphoresis and fever.   Respiratory:  Positive for shortness of breath.    Cardiovascular: Negative.    Gastrointestinal:  Positive for abdominal distention, abdominal pain and nausea. Negative for constipation and vomiting.   Genitourinary: Negative.    Musculoskeletal:  Positive for myalgias.   Skin:  Positive for rash.        Right forearm, painful, erythematous rash   Neurological:  Negative for headaches.   Psychiatric/Behavioral:  Negative for confusion.    Objective:     Vital Signs (Most Recent):  Temp: 98.1 °F (36.7 °C) (09/21/22 1712)  Pulse: 73 (09/21/22 1745)  Resp: 16 (09/21/22 1745)  BP: (!) 96/54 (09/21/22 1745)  SpO2: 96 % (09/21/22 1745)   Vital Signs (24h Range):  Temp:  [97.8 °F (36.6 °C)-98.2 °F (36.8 °C)] 98.1 °F (36.7 °C)  Pulse:  [70-83] 73  Resp:  [14-20] 16  SpO2:  [95 %-100 %] 96 %  BP: ()/(51-80) 96/54     Weight: 97.5 kg (215 lb)  Body mass index is 33.67 kg/m².    Estimated Creatinine Clearance: 63.8 mL/min (based on SCr of 1.2 mg/dL).    Physical Exam  Constitutional:       Appearance: Normal appearance. He is not ill-appearing.   Cardiovascular:       Rate and Rhythm: Normal rate and regular rhythm.      Pulses: Normal pulses.      Heart sounds: Normal heart sounds.   Pulmonary:      Effort: Pulmonary effort is normal.      Breath sounds: Normal breath sounds.   Abdominal:      General: Abdomen is flat. Bowel sounds are normal. There is distension.      Palpations: Abdomen is soft.      Tenderness: There is abdominal tenderness.   Musculoskeletal:         General: No swelling or tenderness.   Skin:     General: Skin is warm and dry.      Findings: Erythema present.   Neurological:      Mental Status: He is alert and oriented to person, place, and time. Mental status is at baseline.   Psychiatric:         Behavior: Behavior normal.         Thought Content: Thought content normal.       Significant Labs: All pertinent labs within the past 24 hours have been reviewed.    Significant Imaging: I have reviewed all pertinent imaging results/findings within the past 24 hours.

## 2022-09-21 NOTE — CONSULTS
[facility] [place]  HARRY consult      Audrey Oneil Jr.  YOB: 1950  Medical Record Number:  [id number]  Attending Physician:  Sree Perez MD   Date of Admission: 9/14/2022       Hospital Day:  7  Current Principal Problem:  Chronic combined systolic and diastolic congestive heart failure      History     Cc:     HPI  Mr Audrey Oneil is a 72 year old male with PMH of iscehmic cardiomyopathy, s/p CRT-D implant, h/o VT, PE/DVT who presented to [facility] with skin and soft tissue infection, likely cellulitis of the left forearm. He was subsequently found to have positive blood cultures for MRSA. Multiple sets positive from 9/14-9/16. Most recent cultures from 9/18 are no growth to date. TTE from 9/14 showed baseline severely depressed LVEF, mild MR and no other significant valvular lesions. He has CRT in place with RA, RV and CS lead. HARRY performed 9/21 with no gross vegetations visualized. EP consulted and proceeding with device extraction for persistent MRSA bacteremia.     Anticoagulant/antiplatelets: Aspirin   ECG: A-sensed V-paced rhythm     Dysphagia or odynophagia:  No   Liver Disease, esophageal disease, or known varices:   No   Upper GI Bleeding:  No   Snoring:   No   Sleep Apnea:   No   Prior neck surgery or radiation:   No   History of anesthetic difficulties:   No   Family history of anesthetic difficulties:   No   Last oral intake: Yesterday before midnight  Able to move neck in all directions: Yes   Platelet count: 191  INR: Not reported       Echo 9/14/22  Moderate left atrial enlargement.  The left ventricle is severely enlarged with eccentric hypertrophy and severely decreased systolic function.  There is severe left ventricular global hypokinesis with anterospetal and apical scar. The estimated ejection fraction is 10-15%.  Left ventricular diastolic dysfunction.  Normal right ventricular size with normal right ventricular systolic function.  Mild mitral regurgitation.  Normal  central venous pressure (3 mmHg).  There is no significant tricuspid regurgitation and therefore the pulmonary artery systolic pressure cannot be reported.      HARRY 9/21/22:   The left ventricle is normal in size with severely decreased systolic function. The estimated ejection fraction is 15%.  Normal right ventricular size with normal right ventricular systolic function.  Biatrial enlargement.  Mild-to-moderate mitral regurgitation.  Normal appearing left atrial appendage. No thrombus is present in the appendage.  There is a small nonmobile echodensity on the ventricular side of the non coronary cusp of the aortic valve. This is more consistent with aortic valve sclerosis than vegetation. Correlate clinically.  No gross vegetations visualized on any intracardiac leads.  Grade 2 plaque present in the descending aorta.      Medications - Outpatient  Prior to Admission medications    Medication Sig Start Date End Date Taking? Authorizing Provider   allopurinoL (ZYLOPRIM) 100 MG tablet Take 2 tablets (200 mg total) by mouth once daily. 6/23/22   Venkatesh Love PA-C   amiodarone (PACERONE) 200 MG Tab TAKE 1 AND 1/2 TABLETS(300 MG) BY MOUTH EVERY DAY 6/23/22   Venkatesh Love PA-C   aspirin (ECOTRIN) 81 MG EC tablet Take 1 tablet (81 mg total) by mouth once daily. 6/23/22 6/23/23  Venkatesh Love PA-C   atorvastatin (LIPITOR) 80 MG tablet Take 1 tablet (80 mg total) by mouth once daily. 6/23/22   Venkatesh Love PA-C   DOBUTamine (DOBUTREX) 500 mg/250 mL (2,000 mcg/mL) 2.5 mcg/kg/min  Patient is 95.7 kg 6/23/22   Venkatesh Love PA-C   furosemide (LASIX) 40 MG tablet Take 1 tablet (40 mg total) by mouth daily as needed (Weight gain of 3 lbs in one day or 5 lbs in one week). 6/23/22 6/23/23  Venkatesh Love PA-C   HYDROcodone-acetaminophen (NORCO)  mg per tablet Take 1 tablet by mouth every 6 (six) hours. 7/7/22   Historical Provider   JARDIANCE 25 mg tablet Take 1 tablet (25 mg total) by mouth once daily. 8/25/22   Migue  СВЕТЛАНА Henry Jr., MD   ondansetron (ZOFRAN-ODT) 4 MG TbDL Dissolve 1 tablet (4 mg total) by mouth every 6 (six) hours as needed (nausea). 6/23/22   Venkatesh Love PA-C   polyethylene glycol (GOLYTELY) 236-22.74-6.74 -5.86 gram suspension SMARTSIG:Milliliter(s) By Mouth 8/1/22   Historical Provider   sacubitriL-valsartan (ENTRESTO) 49-51 mg per tablet Take 1 tablet by mouth 2 (two) times daily. 6/23/22   Venkatesh Love PA-C   simethicone (MYLICON) 80 MG chewable tablet Take 1 tablet (80 mg total) by mouth every 6 (six) hours as needed for Flatulence. 6/23/22   Venkatesh Love PA-C   spironolactone (ALDACTONE) 25 MG tablet Take 1 tablet (25 mg total) by mouth once daily. 6/23/22   Venkatesh oLve PA-C   clopidogreL (PLAVIX) 75 mg tablet Take 1 tablet (75 mg total) by mouth once daily.  Patient not taking: No sig reported 2/4/21 4/1/22  Chuck Sams II, MD   colchicine (COLCRYS) 0.6 mg tablet Take 1 tablet (0.6 mg total) by mouth once daily.  Patient not taking: Reported on 3/18/2022 12/20/21 4/1/22  Jaxson Santo MD   ipratropium (ATROVENT) 0.02 % nebulizer solution Take 2.5 mLs (500 mcg total) by nebulization 4 (four) times daily as needed for Wheezing. Rescue  Patient not taking: Reported on 3/29/2022 12/15/21 4/1/22  Horacio Laughlin MD   metoprolol succinate (TOPROL-XL) 25 MG 24 hr tablet Take 1 tablet (25 mg total) by mouth once daily. 4/1/22 4/8/22  Uma Dupree MD   midodrine (PROAMATINE) 2.5 MG Tab Take 1 tablet (2.5 mg total) by mouth 3 (three) times daily. HOLD until follow up with cardiology 4/1/22 4/8/22  Uma Dupree MD         Medications - Current  Scheduled Meds:   acetaminophen  650 mg Oral QID    allopurinoL  200 mg Oral Daily    amiodarone  300 mg Oral Daily    aspirin  81 mg Oral Daily    atorvastatin  80 mg Oral Daily    enoxaparin  40 mg Subcutaneous Daily    furosemide  40 mg Oral Daily    LIDOcaine  1 patch Transdermal Q24H    sacubitriL-valsartan  1 tablet Oral BID    spironolactone  25 mg Oral  Daily    vancomycin (VANCOCIN) IVPB  1,250 mg Intravenous Q24H     Continuous Infusions:   DOBUTamine IV infusion (non-titrating) 2.5 mcg/kg/min (09/20/22 1800)     PRN Meds:.HYDROcodone-acetaminophen, methocarbamoL, ondansetron, senna-docusate 8.6-50 mg, sodium chloride 0.9%, Pharmacy to dose Vancomycin consult **AND** vancomycin - pharmacy to dose      Allergies  Review of patient's allergies indicates:   Allergen Reactions    Iodine containing multivitamin Swelling     itching    Keflex [cephalexin] Swelling     Eyes.  Tolerated multiple doses of zosyn and 1 dose of augmentin in 2015 and 2016, respectively    Peaches [peach (prunus persica)] Swelling     eyes    Shellfish containing products Swelling    Fig tree Swelling     itching    Tuberculin spenser test ppd Rash         Past Medical History  Past Medical History:   Diagnosis Date    Acute hypoxemic respiratory failure 11/7/2015    Acute idiopathic gout of left knee 12/2/2019    Acute idiopathic gout of right elbow 9/23/2021    AICD (automatic cardioverter/defibrillator) present 12/13/2015      Anticoagulant long-term use     Bronchopneumonia 12/20/2021    CHF (congestive heart failure)     Chronic anticoagulation 5/5/2016    Chronic combined systolic and diastolic heart failure 11/26/2012    EF 10-20% on ECHO 2013    Chronic gout 12/2/2019    Clotting disorder     Coronary artery disease involving native coronary artery of native heart without angina pectoris 11/26/2012    Cath 10- Stents patent non-obstructive disease Cath 11-12015 non-obstructive disease     COVID-19 08/2021    Had antibody infusion    Diverticulosis of colon     DVT (deep venous thrombosis), unspecified laterality 11/12/2015    Dysphonia 1/28/2018    Essential hypertension 11/15/2015    Fine motor impairment 2/2/2021    Hyperlipidemia     Hypertensive heart disease with heart failure 5/5/2016    MI (myocardial infarction) 2009    x's 5    Nicotine abuse     Obesity  11/26/2012    Olecranon bursitis of right elbow 9/19/2021    Pulmonary embolism 2011    Renal disorder     LAVERNE    Right carpal tunnel syndrome 4/6/2018    Stented coronary artery 11/26/2012    LAD stent placed 10/17/2007     Trigger thumb of right hand 4/6/2018         Past Surgical History  Past Surgical History:   Procedure Laterality Date    APPENDECTOMY      BACK SURGERY      CARDIAC DEFIBRILLATOR PLACEMENT      CARDIAC SURGERY  2007    stent    CARPAL TUNNEL RELEASE Right 04/2018    COLONOSCOPY N/A 8/8/2022    Procedure: COLONOSCOPY;  Surgeon: Sascha Keenan MD;  Location: [facility] ENDO (2ND FLR);  Service: Endoscopy;  Laterality: N/A;  EF-15%  pre heart    AICD-St Rodri       [place]OVID [place] [facility] [place]inst mail-am prep-clears 4 hrs prior-tb    INSERTION OF BIVENTRICULAR IMPLANTABLE CARDIOVERTER-DEFIBRILLATOR (ICD) N/A 5/3/2019    Procedure: INSERTION, ICD, BIVENTRICULAR;  Surgeon: Teofilo Pal MD;  Location: [facility] EP LAB;  Service: Cardiology;  Laterality: N/A;  ICH CM,  ICD UPGD BI-V,  SJM, MAC, FAS, 3PREP (dual ICD INSITU)    r knee scope      REVISION OF SKIN POCKET FOR CARDIOVERTER-DEFIBRILLATOR Left 5/3/2019    Procedure: Revision, Skin Pocket, For Cardioverter-Defibrillator;  Surgeon: Teofilo Pal MD;  Location: [facility] EP LAB;  Service: Cardiology;  Laterality: Left;    RIGHT HEART CATHETERIZATION Right 6/14/2021    Procedure: INSERTION, CATHETER, RIGHT HEART;  Surgeon: Lizz Nieto MD;  Location: [facility] CATH LAB;  Service: Cardiology;  Laterality: Right;    RIGHT HEART CATHETERIZATION Right 6/17/2022    Procedure: INSERTION, CATHETER, RIGHT HEART;  Surgeon: Migue Henry Jr., MD;  Location: [facility] CATH LAB;  Service: Cardiology;  Laterality: Right;    SPINE SURGERY      TONSILLECTOMY      TRIGGER FINGER RELEASE Right 04/2018    thumb         Social History  Social History     Socioeconomic History    Marital status:     Number of children: 2   Occupational History    Occupation: [employer]     Comment: unemployed   Tobacco Use    Smoking status: Former     Packs/day: 1.00     Years: 45.00     Pack years: 45.00     Types: Cigarettes     Quit date: 2005     Years since quittin.7    Smokeless tobacco: Never    Tobacco comments:     1-1.5 ppd every day.   Substance and Sexual Activity    Alcohol use: No    Drug use: No    Sexual activity: Not Currently         ROS  10 point ROS performed and negative except as stated in HPI         Physical Examination         Vital Signs  Vitals  Temp: 97.8 °F (36.6 °C)  Temp src: Oral  Pulse: 73  Heart Rate Source: Monitor  Resp: 18  SpO2: 97 %  Pulse Oximetry Type: Intermittent  Flow (L/min): 0  Oxygen Concentration (%): 2  O2 Device (Oxygen Therapy): room air  BP: (!) 122/58  MAP (mmHg): 84  BP Location: Right leg  BP Method: Automatic  Patient Position: Lying      Invasive Hemodynamic Monitoring  CVP (mean): 14 mmHg     24 Hour VS Range    Temp:  [97.8 °F (36.6 °C)-98.2 °F (36.8 °C)]   Pulse:  [73-83]   Resp:  [16-20]   BP: (116-129)/(56-80)   SpO2:  [95 %-97 %]     Intake/Output Summary (Last 24 hours) at 2022 1256  Last data filed at 2022 1800  Gross per 24 hour   Intake 1788.95 ml   Output --   Net 1788.95 ml         Physical Exam:   Constitutional: no acute distress  HEENT: NCAT, EOMI, no scleral icterus  Cardiovascular: Regular rate and rhythm. 2+ carotid, radial, DP pulses bilaterally    Pulmonary: Normal respiratory effort    Abdomen: nontender, non-distended   Neuro: alert and oriented, no focal deficits  Extremities: warm, no edema   MSK: no deformities  Integument: intact, no rashes         Data       Recent Labs   Lab 22  0346 22  0728 22  0316 22  0508 22  0539   WBC 4.05 4.65 4.31 4.80 5.91   HGB 10.5* 11.2* 11.1* 11.1* 11.3*   HCT 32.0* 34.4* 33.8* 34.2* 33.6*   * 142* 168 203 191        No results for input(s): PROTIME, INR in the last 168 hours.     Recent Labs   Lab 22  5025  09/18/22  0728 09/19/22  0316 09/20/22  0508 09/21/22  0539   * 134* 135* 134* 139   K 4.0 4.0 3.9 4.0 4.4    103 103 103 109   CO2 23 25 24 25 24   BUN 46* 44* 40* 33* 25*   CREATININE 1.7* 1.4 1.4 1.3 1.2   ANIONGAP 11 6* 8 6* 6*   CALCIUM 8.6* 8.4* 8.3* 8.5* 8.5*        Recent Labs   Lab 09/17/22  0346 09/18/22  0728 09/19/22  0316 09/20/22  0508 09/21/22  0539   PROT 6.1 6.0 5.8* 6.0 5.8*   ALBUMIN 2.8* 2.7* 2.7* 2.8* 2.7*   BILITOT 0.7 0.7 0.4 0.4 0.5   ALKPHOS 53* 67 79 77 73   AST 22 36 29 22 19   ALT 20 30 27 24 20        Recent Labs   Lab 09/14/22  2125 09/15/22  0320 09/15/22  1142   TROPONINI 0.359* 0.400* 0.372*        BNP (pg/mL)   Date Value   09/14/2022 335 (H)   09/12/2022 348 (H)   09/06/2022 237 (H)   08/29/2022 245 (H)   08/22/2022 233 (H)       Recent Labs   Lab 09/14/22  1302 09/15/22  1142 09/16/22  0443 09/18/22  1105 09/18/22  1117   LABBLOO Gram stain robert bottle: Gram positive cocci  Positive results previously called 09/15/2022  02:59  METHICILLIN RESISTANT STAPHYLOCOCCUS AUREUS  ID consult required at [facility].  * Gram stain aer bottle: Gram positive cocci in clusters resembling Staph  Positive results previously called 09/16/2022  05:41  METHICILLIN RESISTANT STAPHYLOCOCCUS AUREUS  [facility].  For susceptibility see order #[id number]  *  Gram stain aer bottle: Gram positive cocci in clusters resembling Staph  Results called to and read back by:Estrellita Eric RN  09/16/2022  05:03  METHICILLIN RESISTANT STAPHYLOCOCCUS AUREUS  ID consult required at [facility].  For susceptibility see order #[id number]  * Gram stain aer bottle: Gram positive cocci in clusters resembling Staph  Positive results previously called 09/17/2022  05:34  METHICILLIN RESISTANT STAPHYLOCOCCUS AUREUS  [facility].  For  susceptibility see order #[id number]  *  Gram stain aer bottle: Gram positive cocci in clusters resembling Staph  Results called to and read back by:Estrellita Eric RN 09/16/2022  22:55  METHICILLIN RESISTANT STAPHYLOCOCCUS AUREUS  ID consult required at [facility].  For susceptibility see order #[id number]  * No Growth to date  No Growth to date  No Growth to date  No Growth to date No Growth to date  No Growth to date  No Growth to date              Assessment & Plan   72 year old male with PMH of iscehmic cardiomyopathy, s/p CRT-D implant, h/o VT, PE/DVT who [place] [facility] [place] and soft tissue infection, ultimately found to have persistent MRSA bacteremia. Undergoing device extraction with EP today, HARRY during.     MRSA bacteremia  Cardiac device in situ:   -No absolute contraindications of esophageal stricture, tumor, perforation, laceration,or diverticulum and/or active GI bleed  -The risks, benefits & alternatives of the procedure were explained to the patient.   -The risks of transesophageal echo include but are not limited to:  Dental trauma, esophageal trauma/perforation, bleeding, laryngospasm/brochospasm, aspiration, sore throat/hoarseness, & dislodgement of the endotracheal tube/nasogastric tube (where applicable).    -The risks of moderate sedation include hypotension, respiratory depression, arrhythmias, bronchospasm, & death.    -Informed consent was obtained. The patient is agreeable to proceed with the procedure and all questions and concerns addressed        Carlos Manuel Quinones MD  [facility]   Cardiovascular Disease PGY-V

## 2022-09-21 NOTE — SUBJECTIVE & OBJECTIVE
Interval History: Patient afebrile, without leukocytosis. Erythematous macular rash at forearms B/L improved. CrCL: 59 mK/min (stable, with continued improvement). First vanc done yesterday (09/19) trough (14.3).     Review of Systems   Constitutional:  Positive for appetite change and fatigue. Negative for chills, diaphoresis and fever.   Respiratory:  Positive for shortness of breath.    Cardiovascular: Negative.    Gastrointestinal:  Positive for abdominal distention, abdominal pain and nausea. Negative for constipation and vomiting.   Genitourinary: Negative.    Musculoskeletal:  Positive for myalgias.   Skin:  Positive for rash.        Right forearm, painful, erythematous rash   Neurological:  Negative for headaches.   Psychiatric/Behavioral:  Negative for confusion.    Objective:     Vital Signs (Most Recent):  Temp: 98 °F (36.7 °C) (09/20/22 1211)  Pulse: 70 (09/20/22 1211)  Resp: 18 (09/20/22 1211)  BP: 121/60 (09/20/22 1211)  SpO2: 97 % (09/20/22 1211) Vital Signs (24h Range):  Temp:  [97.6 °F (36.4 °C)-98.8 °F (37.1 °C)] 98 °F (36.7 °C)  Pulse:  [68-78] 70  Resp:  [16-20] 18  SpO2:  [93 %-98 %] 97 %  BP: (113-131)/(54-71) 121/60     Weight: 98.2 kg (216 lb 7.9 oz)  Body mass index is 33.91 kg/m².    Estimated Creatinine Clearance: 59 mL/min (based on SCr of 1.3 mg/dL).    Physical Exam  Constitutional:       Appearance: Normal appearance. He is not ill-appearing.   Cardiovascular:      Rate and Rhythm: Normal rate and regular rhythm.      Pulses: Normal pulses.      Heart sounds: Normal heart sounds.   Pulmonary:      Effort: Pulmonary effort is normal.      Breath sounds: Normal breath sounds.   Abdominal:      General: Abdomen is flat. Bowel sounds are normal. There is distension.      Palpations: Abdomen is soft.      Tenderness: There is abdominal tenderness.   Musculoskeletal:         General: No swelling or tenderness.   Skin:     General: Skin is warm and dry.      Findings: Erythema present.    Neurological:      Mental Status: He is alert and oriented to person, place, and time. Mental status is at baseline.   Psychiatric:         Behavior: Behavior normal.         Thought Content: Thought content normal.       Significant Labs: All pertinent labs within the past 24 hours have been reviewed.    Significant Imaging: I have reviewed all pertinent imaging results/findings within the past 24 hours.

## 2022-09-21 NOTE — PROGRESS NOTES
[facility] - Cardiology Stepdown  Heart Transplant  Progress Note    Patient Name: Audrey Oneil Jr.  MRN: [id number]  Admission Date: 9/14/2022  Hospital Length of Stay: 7 days  Attending Physician: Sree Perez MD  Primary Care Provider: Primary Doctor No  Principal Problem:Chronic combined systolic and diastolic congestive heart failure    Subjective:   No events overnight.     Assessment and Plan:     73 yo WM being worked up for LVAD since hospitalization January 2022 for recurrent VT (he thinks had MI) and cardiogenic shock at [facility]. He was  later seen in [facility] where he was treated for cardiogenic shock and sent home on .  He remains on  and is pursuing evaluation for LVAD.     His case was discussed at selection committee and he was deferred pending evaluation of pancreatic mass.  They wish to proceed with MRI but not sure with his ICD if this will be possible.    Meanwhile Presents with MRSA bacteremia.    Bedside echo shows EF approx 25-30%. IVC measuring approx 1.4cm and collapsible. No JVD. CXR shows bilateral pulmonary congestion.    Echo 9/14/22   Moderate left atrial enlargement.   The left ventricle is severely enlarged with eccentric hypertrophy and severely decreased systolic function.   There is severe left ventricular global hypokinesis with anterospetal and apical scar. The estimated ejection fraction is 10-15%.   Left ventricular diastolic dysfunction.   Normal right ventricular size with normal right ventricular systolic function.   Mild mitral regurgitation.   Normal central venous pressure (3 mmHg).   There is no significant tricuspid regurgitation and therefore the pulmonary artery systolic pressure cannot be reported.         * Chronic combined systolic and diastolic congestive heart failure  - Dobutamine 2.5 mcg/kg/min  - Continue with Entresto 24-26, mg BID  - Holding: Spironolactone 25 mg, daily, Jardiance and was previously on Entresto 49-51 mg, BID  - Lasix held avoid  over-diuresing  - Daily weights, Strict I/Os  - Monitor electrolytes and Maintain Mg >2 and K >4  -Telemetry monitoring    Pancreatic lesion  - Gastroenterology consult and recommendations appreciated  - Unable to obtain MRI for further evaluation given CRT-D  - Will hold off on obtaining CT with pancreatic protocol at this time in view of active infection requiring vancomycin and risk of contrast-induced nephrotoxicity in this patient    Bacteremia due to methicillin resistant Staphylococcus aureus  - Monitor WBC count and fever curve, pan-culture if patient spikes  - ID consult and recommendations are appreciated  - Vancomycin 1,250, daily; Monitor level and renal panel  -patient has persistent positive blood cultures.  Cultures from 18 no growth so far.  His currently on vancomycin.  Id on board.  - HARRY tomorrow, NPO after midnight.  -Lead extraction early next wk followed by eval of Pancreatic mass after cr stabilization.  -will eventually need abx for 6 wks.    H/O ventricular tachycardia  - Amiodarone 300 mg, daily  - Monitor LFTs    Coronary artery disease involving native coronary artery of native heart without angina pectoris  - Asprin 81 mg, daily  - Atorvastatin 80 mg, nightly        Benton Rendon MD  Heart Transplant  [facility]  Heart Transplant  Progress Note    Patient Name: Audrey Oneil Jr.  MRN: [id number]  Admission Date: 9/14/2022  Hospital Length of Stay: 7 days  Attending Physician: Sree Perez MD  Primary Care Provider: Primary Doctor No  Principal Problem:Chronic combined systolic and diastolic congestive heart failure    Subjective:     Interval History: No acute issues overnight. Complaining of lt upper arm pain proximal to the midline insertion site. Will get u/s. Appears slightly vol overloaded( net positive 1.7 l). Will resume home po diuretics.    Continuous Infusions:   DOBUTamine IV infusion (non-titrating) 2.5 mcg/kg/min (09/20/22  1800)     Scheduled Meds:   acetaminophen  650 mg Oral QID    allopurinoL  200 mg Oral Daily    amiodarone  300 mg Oral Daily    aspirin  81 mg Oral Daily    atorvastatin  80 mg Oral Daily    enoxaparin  40 mg Subcutaneous Daily    LIDOcaine  1 patch Transdermal Q24H    sacubitriL-valsartan  1 tablet Oral BID    vancomycin (VANCOCIN) IVPB  1,250 mg Intravenous Q24H     PRN Meds:HYDROcodone-acetaminophen, methocarbamoL, ondansetron, senna-docusate 8.6-50 mg, sodium chloride 0.9%, Pharmacy to dose Vancomycin consult **AND** vancomycin - pharmacy to dose    Review of patient's allergies indicates:   Allergen Reactions    Iodine containing multivitamin Swelling     itching    Keflex [cephalexin] Swelling     Eyes.  Tolerated multiple doses of zosyn and 1 dose of augmentin in 2015 and 2016, respectively    Peaches [peach (prunus persica)] Swelling     eyes    Shellfish containing products Swelling    Fig tree Swelling     itching    Tuberculin spenser test ppd Rash     Objective:     Vital Signs (Most Recent):  Temp: 97.8 °F (36.6 °C) (09/21/22 0754)  Pulse: 73 (09/21/22 1122)  Resp: 18 (09/21/22 0943)  BP: (!) 122/58 (09/21/22 0754)  SpO2: 97 % (09/21/22 0754)   Vital Signs (24h Range):  Temp:  [97.8 °F (36.6 °C)-98.2 °F (36.8 °C)] 97.8 °F (36.6 °C)  Pulse:  [70-83] 73  Resp:  [16-20] 18  SpO2:  [95 %-97 %] 97 %  BP: (116-129)/(56-80) 122/58     Patient Vitals for the past 72 hrs (Last 3 readings):   Weight   09/21/22 0500 97.8 kg (215 lb 9.8 oz)   09/20/22 0500 98.2 kg (216 lb 7.9 oz)   09/19/22 0448 96.7 kg (213 lb 3 oz)     Body mass index is 33.77 kg/m².      Intake/Output Summary (Last 24 hours) at 9/21/2022 1155  Last data filed at 9/20/2022 1800  Gross per 24 hour   Intake 1788.95 ml   Output --   Net 1788.95 ml       Hemodynamic Parameters:       Telemetry: no events on tele    Physical Exam  Physical Exam  Constitutional:       Appearance: Normal appearance. He is not ill-appearing.    Cardiovascular:      Rate and Rhythm: Normal rate and regular rhythm.      Pulses: Normal pulses.      Heart sounds: Normal heart sounds.   Elevated JVD.  Pulmonary:      Effort: Pulmonary effort is normal.      Breath sounds: Normal breath sounds.   Abdominal:      General: Abdomen is flat. Bowel sounds are normal. There is distension.      Palpations: Abdomen is soft.      Tenderness: There is abdominal tenderness.   Musculoskeletal:         General: No swelling or tenderness.  1+ pitting edema in b/l LE upto knee.  Skin:     General: Skin is warm and dry.      Findings: Erythema present.   Neurological:      Mental Status: He is alert and oriented to person, place, and time. Mental status is at baseline.   Psychiatric:         Behavior: Behavior normal.         Thought Content: Thought content normal.       Significant Labs:  CBC:  Recent Labs   Lab 09/19/22 0316 09/20/22  0508 09/21/22  0539   WBC 4.31 4.80 5.91   RBC 4.01* 4.01* 3.97*   HGB 11.1* 11.1* 11.3*   HCT 33.8* 34.2* 33.6*    203 191   MCV 84 85 85   MCH 27.7 27.7 28.5   MCHC 32.8 32.5 33.6     BNP:  No results for input(s): BNP in the last 168 hours.    Invalid input(s): BNPTRIAGELBLO  CMP:  Recent Labs   Lab 09/19/22 0316 09/20/22  0508 09/21/22  0539   * 97 98   CALCIUM 8.3* 8.5* 8.5*   ALBUMIN 2.7* 2.8* 2.7*   PROT 5.8* 6.0 5.8*   * 134* 139   K 3.9 4.0 4.4   CO2 24 25 24    103 109   BUN 40* 33* 25*   CREATININE 1.4 1.3 1.2   ALKPHOS 79 77 73   ALT 27 24 20   AST 29 22 19   BILITOT 0.4 0.4 0.5      Coagulation:   No results for input(s): PT, INR, APTT in the last 168 hours.  LDH:  No results for input(s): LDH in the last 72 hours.  Microbiology:  Microbiology Results (last 7 days)       Procedure Component Value Units Date/Time    Blood culture [[id number]] Collected: 09/18/22 1117    Order Status: Completed Specimen: Blood from Peripheral, Right Hand Updated: 09/20/22 1412     Blood Culture, Routine No Growth to date       No Growth to date      No Growth to date    Blood culture [[id number]] Collected: 09/18/22 1105    Order Status: Completed Specimen: Blood from Antecubital, Right Arm Updated: 09/20/22 1212     Blood Culture, Routine No Growth to date      No Growth to date      No Growth to date    Blood culture [[id number]]  (Abnormal) Collected: 09/16/22 0443    Order Status: Completed Specimen: Blood Updated: 09/19/22 0951     Blood Culture, Routine Gram stain aer bottle: Gram positive cocci in clusters resembling Staph      Positive results previously called 09/17/2022  05:34      METHICILLIN RESISTANT STAPHYLOCOCCUS AUREUS  ID consult required at [facility].  For susceptibility see order #[id number]      Narrative:      Lft wrist    Blood culture [[id number]]  (Abnormal)  (Susceptibility) Collected: 09/14/22 1302    Order Status: Completed Specimen: Blood from Wrist, Left Updated: 09/19/22 0734     Blood Culture, Routine Gram stain robert bottle: Gram positive cocci      Positive results previously called 09/15/2022  02:59      METHICILLIN RESISTANT STAPHYLOCOCCUS AUREUS  ID consult required at [facility].       Comment: Previous comment was modified by KRISTINA at 09:12 on 09/17/2022 ID   consult required at [facility].         Blood culture [[id number]]  (Abnormal) Collected: 09/16/22 0443    Order Status: Completed Specimen: Blood Updated: 09/18/22 0748     Blood Culture, Routine Gram stain aer bottle: Gram positive cocci in clusters resembling Staph      Results called to and read back by:Estrellita Eric RN 09/16/2022  22:55      METHICILLIN RESISTANT STAPHYLOCOCCUS AUREUS  ID consult required at [facility].  For susceptibility see order #[id number]      Narrative:      Rt hand    Blood culture [[id number]]  (Abnormal) Collected: 09/15/22 1142    Order Status: Completed Specimen: Blood Updated: 09/18/22 0748      Blood Culture, Routine Gram stain aer bottle: Gram positive cocci in clusters resembling Staph      Positive results previously called 09/16/2022  05:41      METHICILLIN RESISTANT STAPHYLOCOCCUS AUREUS  ID consult required at [facility].  For susceptibility see order #[id number]      Narrative:      Rt AC    Blood culture [[id number]]  (Abnormal) Collected: 09/15/22 1142    Order Status: Completed Specimen: Blood Updated: 09/18/22 0747     Blood Culture, Routine Gram stain aer bottle: Gram positive cocci in clusters resembling Staph      Results called to and read back by:Estrellita Eric RN  09/16/2022  05:03      METHICILLIN RESISTANT STAPHYLOCOCCUS AUREUS  ID consult required at [facility].  For susceptibility see order #[id number]      Narrative:      Rt wrist    Blood culture [[id number]]  (Abnormal) Collected: 09/14/22 1259    Order Status: Completed Specimen: Blood from Peripheral, Antecubital, Left Updated: 09/17/22 1005     Blood Culture, Routine Gram stain robert bottle: Gram positive cocci in clusters resembling Staph      Results called to and read back by: Gilbert Castle RN,  09/15/2022  00:50      Gram stain aer bottle: Gram positive cocci in clusters resembling Staph      Positive results previously called 09/15/2022  03:06      METHICILLIN RESISTANT STAPHYLOCOCCUS AUREUS  ID consult required at [facility].  For susceptibility see order #[id number]      Blood culture [[id number]]     Order Status: Canceled Specimen: Blood     Blood culture [[id number]]     Order Status: Canceled Specimen: Blood     MRSA/SA Rapid ID by PCR from Blood culture [[id number]]  (Abnormal) Collected: 09/15/22 0050    Order Status: Completed Updated: 09/15/22 0156     Staph aureus ID by PCR Positive     MRSA ID by PCR Positive                Estimated Creatinine Clearance: 63.8 mL/min (based on SCr of 1.2 mg/dL).    Diagnostic  Results:      Assessment and Plan:     71 yo WM being worked up for LVAD since hospitalization January 2022 for recurrent VT (he thinks had MI) and cardiogenic shock at [facility]. He was  later seen in [facility] where he was treated for cardiogenic shock and sent home on .  He remains on  and is pursuing evaluation for LVAD.     His case was discussed at selection committee and he was deferred pending evaluation of pancreatic mass.  They wish to proceed with MRI but not sure with his ICD if this will be possible.     Meanwhile Presents with MRSA bacteremia.     Bedside echo shows EF approx 25-30%. IVC measuring approx 1.4cm and collapsible. No JVD. CXR shows bilateral pulmonary congestion.           * Chronic combined systolic and diastolic congestive heart failure  - Dobutamine 2.5 mcg/kg/min  - Continue with Entresto 24-26, mg BID  - will restart home po diueretics. Net positive 1.7 l. ( lasix 40 OD  and aldactone 25 OD)  - Daily weights, Strict I/Os  - Monitor electrolytes and Maintain Mg >2 and K >4  -Telemetry monitoring     Pancreatic lesion  - Gastroenterology consult and recommendations appreciated  - Unable to obtain MRI for further evaluation given CRT-D  - Will hold off on obtaining CT with pancreatic protocol at this time in view of active infection requiring vancomycin and risk of contrast-induced nephrotoxicity in this patient     Bacteremia due to methicillin resistant Staphylococcus aureus  - Monitor WBC count and fever curve, pan-culture if patient spikes  - ID consult and recommendations are appreciated  - Vancomycin 1,250, daily; Monitor level and renal panel  -patient has persistent positive blood cultures.  Cultures from 18 no growth so far.  His currently on vancomycin.  Id on board.  - HARRY today  -Lead extraction early next wk followed by eval of Pancreatic mass after cr stabilization.  -will eventually need abx for 6 wks.     H/O ventricular tachycardia  - Amiodarone 300 mg, daily  - Monitor  LFTs     Coronary artery disease involving native coronary artery of native heart without angina pectoris  - Asprin 81 mg, daily  - Atorvastatin 80 mg, nightly    Benton Rendon MD  Heart Transplant  [facility] - Cardiology Stepdown

## 2022-09-21 NOTE — PROGRESS NOTES
[facility] - Cardiology Stepdown  Infectious Disease  Progress Note    Patient Name: Audrey Oneil Jr.  MRN: [id number]  Admission Date: 9/14/2022  Length of Stay: 6 days  Attending Physician: Sree Perez MD  Primary Care Provider: Primary Doctor No    Isolation Status: Contact  Assessment/Plan:      Cellulitis of left forearm  Erythema, edema, tenderness and warmth of the both right and left forearm. Patient reports prior IV sites.  · Continue vancomycin.   · Ice and elevation.   · Consider ultrasounds to assess for phlebitis.   · If unable to get therapeutic vancomycin levels and ongoing concern for vancomycin related adverse events then will consider transitioning to other antistaphylococcal agent (I.e. Dapto).    Septicemia due to methicillin resistant Staphylococcus aureus  Septicemia on admission due to MRSA bacteremia. Fever resolved and without leukocytosis. Blood cultures 9/14-9/16 remain positive for MRSA. Repeat blood cxrs collected (09/18): NGTD.  · Continue IV-Vancomycin; with continued monitoring for  Therapeutic Vanc trough (14.3 on (09/19) ; if signs of worsening renal function or unable to achieve therapeutic levels (15-20), or significant adverse drug reactions-- recommend switching to Daptomycin.  · PharmD service to manage vancomycin dosing and drug monitoring; goal trough 15-20.  · Recommend abx treatment duration of 6wks s/p ICD-removal or negative blood cxrs--whichever occurs later. Repeat blcx from 9/18 so far ngtd.  · EP consulted for device extraction given bacteremia with MRSA; pt scheduled to undergo HARRY before intervention.  · Will continue to follow pt    Bacteremia due to methicillin resistant Staphylococcus aureus  Unclear source of bacteremia however potential sources include CVC which has been removed, and translocation from skin in the setting of multiple skin eschars.   · Management plan as detailed below, see septicemia-mrsa.            Thank you for your consult. I will follow-up  with patient. Please contact us if you have any additional questions.    Isiah Aragon PA-C  Infectious Disease  [facility] - Cardiology Stepdown    Subjective:     Principal Problem:Chronic combined systolic and diastolic congestive heart failure    HPI: HPI: Patient is a 71yo, male, with PMH of CAD s/p MI, chronic combined systolic and diastolic heart failure on home dobutamine (EF15%; NYHA-3), HTN/HLD, CKD 3, chronic back pain, cardiac resynch therapy defib (CRT-D), hx of recurrent Vtach and cardiogenic shock (01/2022) at [facility]; which prompted patient re-decision to pursue eval / consideration for LVAD and potential heart transplant recipient. Currently patient case being deferred for transplant recipient selection pending further of recently found pancreatic mass with advanced Imaging study to consider ICD in place; but pt is also still under consideration for DT-LVAD.  --Patient presented to ED (09/14) for new acute onset of central generalized abdominal pain with Right flank pain, headache, nausea, fever (102.2F on arrival), with tachycardia (up to 114bpm on arrival), abdominal distention, SOB requiring 4L home oxygen which began less than 12hrs after eating packaged spaghetti - reports being compliant with at-home medications.     Patient found to have MRSA-bacteremia w/ sepsis criteria on arrival (101.2F, 114bpm); unclear source of infection -- possibly eschar burn wound at left wrist (occurred 4-days ago) vs  PICC line less likley due to absence of signs of infected. Blood cxr x2 (09/14): speciated Staph aureus (ID / susc pending); but MRSA-ID PCR: Positive (09/15).   Patient started on IV-Vanc (2g). Fever reduced with anti-pyretic, stable at 99F, endorses sweats. Remains without leukocytosis.   (09/16) CrCL: 45.1mL/min, up trend from (CrCl:35) day prior 09/15.    Repeat blood cxrs x2 (09/15): GPC-cluster, resembling staph (ID / susc pending)  Repeat blood cxr x2 (09/16): sent --awaiting results.  TTE  (09/14): negative for valve vegetations or abscesses; showed left sided atrial and ventricular chamber abnormalities; EF10-15%, LV-diastolic dysfunction; mild mitral regurg.  09/14: Chest-xr: shows increasing pulmonary interstitial edema.  09/14: CT-AP (w/o contrast): No acute abdomen/pelvic abnormality to account for pt hx of pain. showed multiple stable indeterminate pancreatic hypoattenuating lesions. Sigmoid colonic diverticulosis without diverticulitis or other bowel inflammatory process. Cholelithiasis. Cardiomegaly and trace pericardial fluid.          Interval History: Patient afebrile, without leukocytosis. Erythematous macular rash at forearms B/L improved. CrCL: 59 mK/min (stable, with continued improvement). First vanc done yesterday (09/19) trough (14.3).     Review of Systems   Constitutional:  Positive for appetite change and fatigue. Negative for chills, diaphoresis and fever.   Respiratory:  Positive for shortness of breath.    Cardiovascular: Negative.    Gastrointestinal:  Positive for abdominal distention, abdominal pain and nausea. Negative for constipation and vomiting.   Genitourinary: Negative.    Musculoskeletal:  Positive for myalgias.   Skin:  Positive for rash.        Right forearm, painful, erythematous rash   Neurological:  Negative for headaches.   Psychiatric/Behavioral:  Negative for confusion.    Objective:     Vital Signs (Most Recent):  Temp: 98 °F (36.7 °C) (09/20/22 1211)  Pulse: 70 (09/20/22 1211)  Resp: 18 (09/20/22 1211)  BP: 121/60 (09/20/22 1211)  SpO2: 97 % (09/20/22 1211) Vital Signs (24h Range):  Temp:  [97.6 °F (36.4 °C)-98.8 °F (37.1 °C)] 98 °F (36.7 °C)  Pulse:  [68-78] 70  Resp:  [16-20] 18  SpO2:  [93 %-98 %] 97 %  BP: (113-131)/(54-71) 121/60     Weight: 98.2 kg (216 lb 7.9 oz)  Body mass index is 33.91 kg/m².    Estimated Creatinine Clearance: 59 mL/min (based on SCr of 1.3 mg/dL).    Physical Exam  Constitutional:       Appearance: Normal appearance. He is not  ill-appearing.   Cardiovascular:      Rate and Rhythm: Normal rate and regular rhythm.      Pulses: Normal pulses.      Heart sounds: Normal heart sounds.   Pulmonary:      Effort: Pulmonary effort is normal.      Breath sounds: Normal breath sounds.   Abdominal:      General: Abdomen is flat. Bowel sounds are normal. There is distension.      Palpations: Abdomen is soft.      Tenderness: There is abdominal tenderness.   Musculoskeletal:         General: No swelling or tenderness.   Skin:     General: Skin is warm and dry.      Findings: Erythema present.   Neurological:      Mental Status: He is alert and oriented to person, place, and time. Mental status is at baseline.   Psychiatric:         Behavior: Behavior normal.         Thought Content: Thought content normal.       Significant Labs: All pertinent labs within the past 24 hours have been reviewed.    Significant Imaging: I have reviewed all pertinent imaging results/findings within the past 24 hours.

## 2022-09-22 PROBLEM — I82.622: Status: ACTIVE | Noted: 2022-01-01

## 2022-09-22 NOTE — PROGRESS NOTES
Clinical Cardiac Electrophysiology Follow-Up Note     Date:  9/22/2022  Requesting attending:  Sree Perez MD    Chief Complaint/Reason for Consultation:    Patient with MRSA bacteremia. EP consulted for device extraction     Problem List:   72 y.o.male with:     1. MRSA bacteremia   2. 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 Teofilo Moore , and upgrade to a [employer] CRT-D in 5/2019 by Dr. Teofilo Moore.   3. H/O ventricular tachycardia on amiodarone 300 mg daily  4. LBBB  5. CAD s/p STEMI s/p PCI LAD 2007  6. provoked PE/DVT in 2011  7. Former smoker    24 Hr Events and Subjective:     Yesterday:   9/21/22  UE: New DVT located in L brachial vein STARTED on heparin gtt   9/21/22 HARRY: EF 15%  small nonmobile echodensity on the ventricular side of the non coronary cusp of the aortic valve. This is more consistent with aortic valve sclerosis than vegetation    Telemetry 9/21/22-9/22/22: V paced rhythm    Scheduled Meds:       acetaminophen  650 mg Oral QID    allopurinoL  200 mg Oral Daily    amiodarone  300 mg Oral Daily    aspirin  81 mg Oral Daily    atorvastatin  80 mg Oral Daily    furosemide  40 mg Oral Daily    LIDOcaine  1 patch Transdermal Q24H    sacubitriL-valsartan  1 tablet Oral BID    spironolactone  25 mg Oral Daily    vancomycin (VANCOCIN) IVPB  1,250 mg Intravenous Q24H       Continuous Infusions:      DOBUTamine IV infusion (non-titrating) 2.5 mcg/kg/min (09/21/22 2041)    heparin (porcine) in D5W 18 Units/kg/hr (09/21/22 2341)     PRN Meds:   fentaNYL, haloperidol lactate, heparin (PORCINE), heparin (PORCINE), HYDROcodone-acetaminophen, methocarbamoL, ondansetron, ondansetron, oxyCODONE, senna-docusate 8.6-50 mg, sodium chloride 0.9%, Pharmacy to dose Vancomycin consult **AND** vancomycin - pharmacy to dose    Allergies:     Review of patient's allergies indicates:   Allergen Reactions    Iodine containing multivitamin Swelling      itching    Keflex [cephalexin] Swelling     Eyes.  Tolerated multiple doses of zosyn and 1 dose of augmentin in 2015 and 2016, respectively    Peaches [peach (prunus persica)] Swelling     eyes    Shellfish containing products Swelling    Fig tree Swelling     itching    Tuberculin spenser test ppd Rash     Physical Exam:     Vitals:    09/22/22 0539   BP:    Pulse: 77   Resp:    Temp:      Eyes: Sclerae anicteric. Ears/nose/throat: Mucous membranes moist. Neck: No thyromegaly, lymphadenopathy, or jugular venous distention. Respiratory: Lungs clear to auscultation bilaterally. Cardiovascular: Heart was regular with normal first and second heart sounds. No S3 or S4. GI: Soft, nontender, nondistended, with normal bowel sounds. Musculoskeletal: extremities without cyanosis, clubbing or pitting edema. Neurologic: Patient is alert and oriented x3 and there is no focal strength deficit. Skin: no rashes, cuts, sores. Psychiatric: normal affect. Hematologic: no abnormal bruising or bleeding.    Laboratory Data:      BUN/Cr/glu/ALT/AST/amyl/lip:  20/1.4/--/16/16/--/-- (09/22 0552)  WBC/Hgb/Hct/Plts:  6.14/11.2/34.7/228 (09/22 0552)  PT/INR/PTT:  --/--/42.4 (09/22 0552)    9/21/22 HARRY:?   The left ventricle is normal in size with severely decreased systolic function. The estimated ejection fraction is 15%.  Normal right ventricular size with normal right ventricular systolic function.  Biatrial enlargement.  Mild-to-moderate mitral regurgitation.  Normal appearing left atrial appendage. No thrombus is present in the appendage.  There is a small nonmobile echodensity on the ventricular side of the non coronary cusp of the aortic valve. This is more consistent with aortic valve sclerosis than vegetation. Correlate clinically.  No gross vegetations visualized on any intracardiac leads.  Grade 2 plaque present in the descending aorta.    9/21/22 UE US: There is a catheter with associated DVT of the left brachial vein.      Plan:    MRSA BACTEREMIA   HARRY: small nonmobile echodensity on the ventricular side of the non coronary cusp of the aortic valve. This is more consistent with aortic valve sclerosis than vegetation    Plan:  Patient with no history of CABG or sternal incision, will need CTS back-up for this case. Will plan for early next week depending on HARRY results. Tentatively Tuesday.   Consult CTS  will need CTS back-up for this case  Continue antibiotics per ID recs    Current plan of care discussed with the patient, all questions answered.   Current plan of care discussed with primary service.    Ramu Macedo   [facility]  PGY4 Cardiology Fellow

## 2022-09-22 NOTE — PROCEDURES
"Audrey Oneil Jr. is a 72 y.o. male patient.    Temp: 97 °F (36.1 °C) (09/22/22 1236)  Pulse: 77 (09/22/22 1236)  Resp: 17 (09/22/22 1236)  BP: 119/73 (09/22/22 1236)  SpO2: 95 % (09/22/22 1236)  Weight: 95.9 kg (211 lb 6.7 oz) (09/22/22 0500)  Height: 5' 7" (170.2 cm) (09/21/22 1621)    PICC  Date/Time: 9/22/2022 3:09 PM  Performed by: Marisa Mccall RN  Assisting provider: Michael Lagos RN  Consent Done: Yes  Time out: Immediately prior to procedure a time out was called to verify the correct patient, procedure, equipment, support staff and site/side marked as required  Indications: med administration and vascular access  Anesthesia: local infiltration  Local anesthetic: lidocaine 1% without epinephrine  Anesthetic Total (mL): 3  Preparation: skin prepped with ChloraPrep  Skin prep agent dried: skin prep agent completely dried prior to procedure  Sterile barriers: all five maximum sterile barriers used - cap, mask, sterile gown, sterile gloves, and large sterile sheet  Hand hygiene: hand hygiene performed prior to central venous catheter insertion  Location details: right basilic  Catheter type: double lumen  Catheter size: 5 Fr  Catheter Length: 36cm    Ultrasound guidance: yes  Vessel Caliber: medium and patent, compressibility normal  Vascular Doppler: not done  Needle advanced into vessel with real time Ultrasound guidance.  Guidewire confirmed in vessel.  Image recorded and saved.  Sterile sheath used.  Number of attempts: 1  Post-procedure: blood return through all ports, chlorhexidine patch and sterile dressing applied  Technical procedures used: 3CG  Specimens: No  Implants: No  Assessment: placement verified by x-ray  Complications: none        Name   9/22/2022    "

## 2022-09-22 NOTE — ASSESSMENT & PLAN NOTE
Septicemia on admission due to MRSA bacteremia. Fever resolved and without leukocytosis. Blood cultures 9/14-9/16 positive for MRSA. Repeat blood cxrs collected (09/18): NGTD.  · Continue IV-Vancomycin; with continued monitoring for Therapeutic Vanc trough (14.3 on (09/19); next trough drawing on (09/23).  · If signs of worsening renal function or unable to achieve therapeutic levels (15-20) -- recommend switching to Daptomycin (susceptibility confirmed with micro lab)  · PharmD service to manage vancomycin dosing and drug monitoring; goal trough 15-20.  · Recommend abx treatment duration of 6wks s/p ICD-removal or negative blood cxrs--whichever occurs later  · EP consulted for device extraction given bacteremia with MRSA. HARRY completed (09/21), no vegetations noted on ICD-leads; echodensity at aortic valve more consistent with sclerosis than vegetation. Still, EP plan for potential device removal next week--pending further update.  · See OPAT details below.        Outpatient Antibiotic Therapy Plan:    Please send referral to [facility].    1) Infection: MRSA Bacteremia    2) Discharge Antibiotics:    Intravenous antibiotics:    IV-Vancomycin  - pharm to dose    Oral antibiotics: n/a     3) Therapy Duration:  6wks s/p ICD-removal (anticipated removal week of 09/26)    Estimated end date of IV antibiotics: as late as (11/11/22)    4) Outpatient Weekly Labs:    Order the following labs to be drawn on Mondays:    CBC   CMP    CRP     CPK (when on Daptomycin)   Vancomycin trough. Target 15-20    If discharged on vancomycin IV, order the following additional labs to be drawn on Thursdays:   CMP    Vancomycin trough. Target 15-20    If vancomycin trough is not at target (15-20) prior to discharge, schedule vancomycin trough to be drawn before their fourth outpatient dose.    5) Fax Lab Results to Infectious Diseases Provider:  Dr. Trinity Aggarwal    [facility] Fax Number:  [phone]    6) Outpatient Infectious Diseases Follow-up     Follow-up appointment will be arranged by the ID clinic and will be found in the patient's appointments tab.     Prior to discharge, please ensure the patient's follow-up has been scheduled.     If there is still no follow-up scheduled prior to discharge, please send an EPIC message to Rufina Miles in Infectious Diseases.

## 2022-09-22 NOTE — ASSESSMENT & PLAN NOTE
--developed at site of left mid-line, line since removed.  -- Pt receiving Heparin  -- Ice and elevation for pain and swelling.  -- Recommend continued monitoring of affected site, as well as previous IV-site at right forearm for any worsening redness or new pain or increased warmth.

## 2022-09-22 NOTE — SUBJECTIVE & OBJECTIVE
Interval History: No acute issues overnight. HARRY 9/21 no signs of vegetation. Pt was found to have Lt brachial vein thrombosis and on Heparin currently.   Resumed PO diuretics 9/21  Continuous Infusions:   DOBUTamine IV infusion (non-titrating) 2.5 mcg/kg/min (09/21/22 2041)    heparin (porcine) in D5W 18 Units/kg/hr (09/21/22 2341)     Scheduled Meds:   acetaminophen  650 mg Oral QID    allopurinoL  200 mg Oral Daily    amiodarone  300 mg Oral Daily    aspirin  81 mg Oral Daily    atorvastatin  80 mg Oral Daily    furosemide  40 mg Oral Daily    LIDOcaine  1 patch Transdermal Q24H    sacubitriL-valsartan  1 tablet Oral BID    spironolactone  25 mg Oral Daily    vancomycin (VANCOCIN) IVPB  1,250 mg Intravenous Q24H     PRN Meds:fentaNYL, haloperidol lactate, heparin (PORCINE), heparin (PORCINE), HYDROcodone-acetaminophen, methocarbamoL, ondansetron, ondansetron, oxyCODONE, senna-docusate 8.6-50 mg, sodium chloride 0.9%, Pharmacy to dose Vancomycin consult **AND** vancomycin - pharmacy to dose    Review of patient's allergies indicates:   Allergen Reactions    Iodine containing multivitamin Swelling     itching    Keflex [cephalexin] Swelling     Eyes.  Tolerated multiple doses of zosyn and 1 dose of augmentin in 2015 and 2016, respectively    Peaches [peach (prunus persica)] Swelling     eyes    Shellfish containing products Swelling    Fig tree Swelling     itching    Tuberculin spenser test ppd Rash     Objective:     Vital Signs (Most Recent):  Temp: 97 °F (36.1 °C) (09/22/22 1236)  Pulse: 77 (09/22/22 1236)  Resp: 17 (09/22/22 1236)  BP: 119/73 (09/22/22 1236)  SpO2: 95 % (09/22/22 1236)   Vital Signs (24h Range):  Temp:  [97 °F (36.1 °C)-98.6 °F (37 °C)] 97 °F (36.1 °C)  Pulse:  [70-84] 77  Resp:  [14-20] 17  SpO2:  [95 %-100 %] 95 %  BP: ()/(51-73) 119/73     Patient Vitals for the past 72 hrs (Last 3 readings):   Weight   09/22/22 0500 95.9 kg (211 lb 6.7 oz)   09/21/22 1621 97.5 kg (215 lb)   09/21/22  0500 97.8 kg (215 lb 9.8 oz)       Body mass index is 33.11 kg/m².      Intake/Output Summary (Last 24 hours) at 9/22/2022 1307  Last data filed at 9/22/2022 0900  Gross per 24 hour   Intake 460 ml   Output 600 ml   Net -140 ml         Hemodynamic Parameters:       Telemetry: no events on tele    Physical Exam  Physical Exam  Constitutional:       Appearance: Normal appearance. He is not ill-appearing.   Cardiovascular:      Rate and Rhythm: Normal rate and regular rhythm.      Pulses: Normal pulses.      Heart sounds: Normal heart sounds.   Elevated JVD.  Pulmonary:      Effort: Pulmonary effort is normal.      Breath sounds: Normal breath sounds.   Abdominal:      General: Abdomen is flat. Bowel sounds are normal. There is distension.      Palpations: Abdomen is soft.      Tenderness: There is abdominal tenderness.   Musculoskeletal:         General: No swelling   1+ pitting edema in b/l LE upto knee. Mild tenderness around left upper arm, no fluctuance.  Skin:     General: Skin is warm and dry.      Findings: Erythema present.   Neurological:      Mental Status: He is alert and oriented to person, place, and time. Mental status is at baseline.   Psychiatric:         Behavior: Behavior normal.         Thought Content: Thought content normal.       Significant Labs:  CBC:  Recent Labs   Lab 09/21/22  0539 09/21/22  2309 09/22/22  0552   WBC 5.91 6.57 6.14   RBC 3.97* 4.11* 4.02*   HGB 11.3* 11.4* 11.2*   HCT 33.6* 34.7* 34.7*    242 228   MCV 85 84 86   MCH 28.5 27.7 27.9   MCHC 33.6 32.9 32.3       BNP:  No results for input(s): BNP in the last 168 hours.    Invalid input(s): BNPTRIAGELBLO  CMP:  Recent Labs   Lab 09/20/22  0508 09/21/22  0539 09/22/22  0552   GLU 97 98 95   CALCIUM 8.5* 8.5* 8.8   ALBUMIN 2.8* 2.7* 2.9*   PROT 6.0 5.8* 6.0   * 139 137   K 4.0 4.4 4.0   CO2 25 24 24    109 105   BUN 33* 25* 20   CREATININE 1.3 1.2 1.4   ALKPHOS 77 73 80   ALT 24 20 16   AST 22 19 16   BILITOT  0.4 0.5 0.6        Coagulation:   Recent Labs   Lab 09/21/22  2309 09/22/22  0552   INR 1.0  --    APTT 28.8 42.4*     LDH:  No results for input(s): LDH in the last 72 hours.  Microbiology:  Microbiology Results (last 7 days)       Procedure Component Value Units Date/Time    Blood culture [[id number]] Collected: 09/18/22 1105    Order Status: Completed Specimen: Blood from Antecubital, Right Arm Updated: 09/22/22 1212     Blood Culture, Routine No Growth to date      No Growth to date      No Growth to date      No Growth to date      No Growth to date    Blood culture [[id number]] Collected: 09/18/22 1117    Order Status: Completed Specimen: Blood from Peripheral, Right Hand Updated: 09/21/22 1412     Blood Culture, Routine No Growth to date      No Growth to date      No Growth to date      No Growth to date    Blood culture [[id number]]  (Abnormal) Collected: 09/16/22 0443    Order Status: Completed Specimen: Blood Updated: 09/19/22 0951     Blood Culture, Routine Gram stain aer bottle: Gram positive cocci in clusters resembling Staph      Positive results previously called 09/17/2022  05:34      METHICILLIN RESISTANT STAPHYLOCOCCUS AUREUS  ID consult required at [facility].  For susceptibility see order #[id number]      Narrative:      Lft wrist    Blood culture [[id number]]  (Abnormal)  (Susceptibility) Collected: 09/14/22 1302    Order Status: Completed Specimen: Blood from Wrist, Left Updated: 09/19/22 0734     Blood Culture, Routine Gram stain robert bottle: Gram positive cocci      Positive results previously called 09/15/2022  02:59      METHICILLIN RESISTANT STAPHYLOCOCCUS AUREUS  ID consult required at [facility].       Comment: Previous comment was modified by KRISTINA at 09:12 on 09/17/2022 ID   consult required at [facility].         Blood culture [[id number]]  (Abnormal) Collected: 09/16/22 0443    Order Status:  Completed Specimen: Blood Updated: 09/18/22 0748     Blood Culture, Routine Gram stain aer bottle: Gram positive cocci in clusters resembling Staph      Results called to and read back by:Estrellita Eric RN 09/16/2022  22:55      METHICILLIN RESISTANT STAPHYLOCOCCUS AUREUS  [facility].  For susceptibility see order #[id number]      Narrative:      Rt hand    Blood culture [[id number]]  (Abnormal) Collected: 09/15/22 1142    Order Status: Completed Specimen: Blood Updated: 09/18/22 0748     Blood Culture, Routine Gram stain aer bottle: Gram positive cocci in clusters resembling Staph      Positive results previously called 09/16/2022  05:41      METHICILLIN RESISTANT STAPHYLOCOCCUS AUREUS  [facility].  For susceptibility see order #[id number]      Narrative:      Rt AC    Blood culture [[id number]]  (Abnormal) Collected: 09/15/22 1142    Order Status: Completed Specimen: Blood Updated: 09/18/22 0747     Blood Culture, Routine Gram stain aer bottle: Gram positive cocci in clusters resembling Staph      Results called to and read back by:Estrellita Eric RN  09/16/2022  05:03      METHICILLIN RESISTANT STAPHYLOCOCCUS AUREUS  ID consult [facility].  For susceptibility see order #[id number]      Narrative:      Rt wrist    Blood culture [[id number]]  (Abnormal) Collected: 09/14/22 1259    Order Status: Completed Specimen: Blood from Peripheral, Antecubital, Left Updated: 09/17/22 1005     Blood Culture, Routine Gram stain robert bottle: Gram positive cocci in clusters resembling Staph      Results called to and read back by: Gilbert Castle RN,  09/15/2022  00:50      Gram stain aer bottle: Gram positive cocci in clusters resembling Staph      Positive results previously called 09/15/2022  03:06      METHICILLIN RESISTANT STAPHYLOCOCCUS AUREUS  ID [facility]  and [facility].  For susceptibility see order #[id number]                  Estimated Creatinine Clearance: 54.2 mL/min (based on SCr of 1.4 mg/dL).    Diagnostic Results:

## 2022-09-22 NOTE — CONSULTS
Double lumen PICC to right basilic vein.  36 cm in length, 30 cm arm circumference and 0 cm exposed.   Lot # [id number].

## 2022-09-22 NOTE — SUBJECTIVE & OBJECTIVE
Interval History: Patient afebrile, without leukocytosis. Repeat blood cxrs (09/18): NGTD. U/S of Left UE (09/21) positive for DVT at Left Brachial Vein near insertion of mid-line catheter. Mid-line removed, area red, painful, increased warmth, and firm fluid collection palpable.  HARRY (09/21); completed for eval of ICD before potential removal): negative for thrombus; small non-mobile echodensity on ventricular side of aortic valve--appearing more consistent with aortic valve stenosis, than vegetation. Vitals stable.    Review of Systems   Constitutional:  Positive for appetite change and fatigue. Negative for chills, diaphoresis and fever.   Respiratory:  Positive for shortness of breath.    Cardiovascular: Negative.    Gastrointestinal:  Positive for abdominal distention and abdominal pain. Negative for blood in stool and vomiting.   Genitourinary: Negative.    Skin:  Positive for color change.        Pain, with redness and warmth, firm mass at Left UE at previous site of mid-line.  Also area of redness and swelling, at right forearm s/p 2hrs IV-removal.   Neurological:  Negative for numbness and headaches.   Psychiatric/Behavioral:  Negative for confusion.    Objective:     Vital Signs (Most Recent):  Temp: 97 °F (36.1 °C) (09/22/22 1236)  Pulse: 77 (09/22/22 1236)  Resp: 17 (09/22/22 1236)  BP: 119/73 (09/22/22 1236)  SpO2: 95 % (09/22/22 1236) Vital Signs (24h Range):  Temp:  [97 °F (36.1 °C)-98.6 °F (37 °C)] 97 °F (36.1 °C)  Pulse:  [70-84] 77  Resp:  [14-20] 17  SpO2:  [95 %-100 %] 95 %  BP: ()/(51-73) 119/73     Weight: 95.9 kg (211 lb 6.7 oz)  Body mass index is 33.11 kg/m².    Estimated Creatinine Clearance: 54.2 mL/min (based on SCr of 1.4 mg/dL).    Physical Exam  Constitutional:       Appearance: He is ill-appearing.   Cardiovascular:      Rate and Rhythm: Normal rate and regular rhythm.      Pulses: Normal pulses.      Heart sounds: Normal heart sounds.   Pulmonary:      Effort: Pulmonary effort  is normal.      Breath sounds: Normal breath sounds.   Abdominal:      General: Bowel sounds are normal. There is distension.      Palpations: Abdomen is soft.      Tenderness: There is abdominal tenderness.   Musculoskeletal:         General: No swelling or tenderness.   Skin:     General: Skin is warm and dry.      Findings: Erythema present.      Comments: Left UE: erythema, increased warmth, firm mass, with TTP at bicipital groove near previous site of mid-line (U/S positive for DVT L brachial vein)  Right UE: area of redness and swelling at prior site of IV 2hrs s/p removal.    Neurological:      Mental Status: He is alert and oriented to person, place, and time. Mental status is at baseline.   Psychiatric:         Behavior: Behavior normal.         Thought Content: Thought content normal.       Significant Labs: All pertinent labs within the past 24 hours have been reviewed.    Significant Imaging: I have reviewed all pertinent imaging results/findings within the past 24 hours.

## 2022-09-22 NOTE — PROGRESS NOTES
[facility]  Infectious Disease  Progress Note    Patient Name: Audrey Oneil Jr.  MRN: [id number]  Admission Date: 9/14/2022  Length of Stay: 8 days  Attending Physician: Sree Perez MD  Primary Care Provider: Primary Doctor No    Isolation Status: Contact  Assessment/Plan:      Acute thrombosis of left brachial vein  --developed at site of left mid-line, line since removed.  -- Pt receiving Heparin  -- Ice and elevation for pain and swelling.  -- Recommend continued monitoring of affected site, as well as previous IV-site at right forearm for any worsening redness or new pain or increased warmth.       Septicemia due to methicillin resistant Staphylococcus aureus  Septicemia on admission due to MRSA bacteremia. Fever resolved and without leukocytosis. Blood cultures 9/14-9/16 positive for MRSA. Repeat blood cxrs collected (09/18): NGTD.   · Continue IV-Vancomycin; with continued monitoring for Therapeutic Vanc trough (14.3 on (09/19); next trough drawing on (09/23).  · If signs of worsening renal function or unable to achieve therapeutic levels (15-20) -- recommend switching to Daptomycin (susceptibility confirmed with micro lab)  · PharmD service to manage vancomycin dosing and drug monitoring; goal trough 15-20.  · Recommend abx treatment duration of 6wks s/p ICD-removal or negative blood cxrs--whichever occurs later  · EP consulted for device extraction given bacteremia with MRSA. HARRY completed (09/21), no vegetations noted on ICD-leads; echodensity at aortic valve more consistent with sclerosis than vegetation. Still, EP plan for potential device removal next week--pending further update.  · See OPAT details below; PICC line placed today (09/22).        Outpatient Antibiotic Therapy Plan:    Please send referral to [facility].    1) Infection: MRSA Bacteremia    2) Discharge Antibiotics:    Intravenous antibiotics:    IV-Vancomycin  - pharm to  dose    Oral antibiotics: n/a     3) Therapy Duration:  6wks s/p ICD-removal (anticipated removal week of 09/26)    Estimated end date of IV antibiotics: as late as (11/11/22)    4) Outpatient Weekly Labs:    Order the following labs to be drawn on Mondays:    CBC   CMP    CRP     CPK (when on Daptomycin)   Vancomycin trough. Target 15-20    If discharged on vancomycin IV, order the following additional labs to be drawn on Thursdays:   CMP    Vancomycin trough. Target 15-20    If vancomycin trough is not at target (15-20) prior to discharge, schedule vancomycin trough to be drawn before their fourth outpatient dose.    5) Fax Lab Results to Infectious Diseases Provider:  Dr. Trinity Aggarwal    [facility] Fax Number: [phone]    6) Outpatient Infectious Diseases Follow-up     Follow-up appointment will be arranged by the ID clinic and will be found in the patient's appointments tab.     Prior to discharge, please ensure the patient's follow-up has been scheduled.     If there is still no follow-up scheduled prior to discharge, please send an EPIC message to Rufina Miles in Infectious Diseases.          Bacteremia due to methicillin resistant Staphylococcus aureus  Unclear source of bacteremia however potential sources include CVC which has been removed, and translocation from skin in the setting of multiple skin eschars.   · Management plan as detailed below, see septicemia-mrsa.      Thank you for your consult. I will sign off. Please contact us if you have any additional questions.    Isiah Aragon PA-C  Infectious Disease  [facility]    Subjective:     Principal Problem:Chronic combined systolic and diastolic congestive heart failure    HPI: HPI: Patient is a 71yo, male, with PMH of CAD s/p MI, chronic combined systolic and diastolic heart failure on home dobutamine (EF15%; NYHA-3), HTN/HLD, CKD 3, chronic back pain, cardiac resynch therapy defib (CRT-D), hx of recurrent  Vtach and cardiogenic shock (01/2022) at [facility]; which prompted patient re-decision to pursue eval / consideration for LVAD and potential heart transplant recipient. Currently patient case being deferred for transplant recipient selection pending further of recently found pancreatic mass with advanced Imaging study to consider ICD in place; but pt is also still under consideration for DT-LVAD.  --Patient presented to ED (09/14) for new acute onset of central generalized abdominal pain with Right flank pain, headache, nausea, fever (102.2F on arrival), with tachycardia (up to 114bpm on arrival), abdominal distention, SOB requiring 4L home oxygen which began less than 12hrs after eating packaged spaghetti - reports being compliant with at-home medications.     Patient found to have MRSA-bacteremia w/ sepsis criteria on arrival (101.2F, 114bpm); unclear source of infection -- possibly eschar burn wound at left wrist (occurred 4-days ago) vs  PICC line less likley due to absence of signs of infected. Blood cxr x2 (09/14): speciated Staph aureus (ID / susc pending); but MRSA-ID PCR: Positive (09/15).   Patient started on IV-Vanc (2g). Fever reduced with anti-pyretic, stable at 99F, endorses sweats. Remains without leukocytosis.   (09/16) CrCL: 45.1mL/min, up trend from (CrCl:35) day prior 09/15.    Repeat blood cxrs x2 (09/15): GPC-cluster, resembling staph (ID / susc pending)  Repeat blood cxr x2 (09/16): sent --awaiting results.  TTE (09/14): negative for valve vegetations or abscesses; showed left sided atrial and ventricular chamber abnormalities; EF10-15%, LV-diastolic dysfunction; mild mitral regurg.  09/14: Chest-xr: shows increasing pulmonary interstitial edema.  09/14: CT-AP (w/o contrast): No acute abdomen/pelvic abnormality to account for pt hx of pain. showed multiple stable indeterminate pancreatic hypoattenuating lesions. Sigmoid colonic diverticulosis without diverticulitis or other bowel inflammatory  process. Cholelithiasis. Cardiomegaly and trace pericardial fluid.          Interval History: Patient afebrile, without leukocytosis. Repeat blood cxrs (09/18): NGTD. U/S of Left UE (09/21) positive for DVT at Left Brachial Vein near insertion of mid-line catheter. Mid-line removed, area red, painful, increased warmth, and firm fluid collection palpable.  HARRY (09/21); completed for eval of ICD before potential removal): negative for thrombus; small non-mobile echodensity on ventricular side of aortic valve--appearing more consistent with aortic valve stenosis, than vegetation. Vitals stable.    Review of Systems   Constitutional:  Positive for appetite change and fatigue. Negative for chills, diaphoresis and fever.   Respiratory:  Positive for shortness of breath.    Cardiovascular: Negative.    Gastrointestinal:  Positive for abdominal distention and abdominal pain. Negative for blood in stool and vomiting.   Genitourinary: Negative.    Skin:  Positive for color change.        Pain, with redness and warmth, firm mass at Left UE at previous site of mid-line.  Also area of redness and swelling, at right forearm s/p 2hrs IV-removal.   Neurological:  Negative for numbness and headaches.   Psychiatric/Behavioral:  Negative for confusion.    Objective:     Vital Signs (Most Recent):  Temp: 97 °F (36.1 °C) (09/22/22 1236)  Pulse: 77 (09/22/22 1236)  Resp: 17 (09/22/22 1236)  BP: 119/73 (09/22/22 1236)  SpO2: 95 % (09/22/22 1236) Vital Signs (24h Range):  Temp:  [97 °F (36.1 °C)-98.6 °F (37 °C)] 97 °F (36.1 °C)  Pulse:  [70-84] 77  Resp:  [14-20] 17  SpO2:  [95 %-100 %] 95 %  BP: ()/(51-73) 119/73     Weight: 95.9 kg (211 lb 6.7 oz)  Body mass index is 33.11 kg/m².    Estimated Creatinine Clearance: 54.2 mL/min (based on SCr of 1.4 mg/dL).    Physical Exam  Constitutional:       Appearance: He is ill-appearing.   Cardiovascular:      Rate and Rhythm: Normal rate and regular rhythm.      Pulses: Normal pulses.       Heart sounds: Normal heart sounds.   Pulmonary:      Effort: Pulmonary effort is normal.      Breath sounds: Normal breath sounds.   Abdominal:      General: Bowel sounds are normal. There is distension.      Palpations: Abdomen is soft.      Tenderness: There is abdominal tenderness.   Musculoskeletal:         General: No swelling or tenderness.   Skin:     General: Skin is warm and dry.      Findings: Erythema present.      Comments: Left UE: erythema, increased warmth, firm mass, with TTP at bicipital groove near previous site of mid-line (U/S positive for DVT L brachial vein)  Right UE: area of redness and swelling at prior site of IV 2hrs s/p removal.    Neurological:      Mental Status: He is alert and oriented to person, place, and time. Mental status is at baseline.   Psychiatric:         Behavior: Behavior normal.         Thought Content: Thought content normal.       Significant Labs: All pertinent labs within the past 24 hours have been reviewed.    Significant Imaging: I have reviewed all pertinent imaging results/findings within the past 24 hours.

## 2022-09-22 NOTE — PLAN OF CARE
Pt free of falls/trauma/injuries.  Denies c/o SOB, CP, or discomfort.  Generalized skin remains CDI; No edema noted.  Pt being diuresed IVP Lasix; diuresing well.   Wt remains stable.  Electrolytes replaced as ordered.  Pt remains on  gtts, started on Heparin gtts.  Pt tolerating plan of care.

## 2022-09-22 NOTE — ASSESSMENT & PLAN NOTE
Started on Heparin. IV over left arm replaced with rt arm iv. Will plan to get a PICC over the rt arm and monitor CVP and Mixed venous oxygen.

## 2022-09-22 NOTE — PLAN OF CARE
Received a call from radiology ultrasound regarding presence of LUE DVT study which was ordered due to patient complaining of tenderness of upper extremity near insertion of midline.  Reported DVT located in L brachial vein.  Plan to replace midline in RUE due to patient requiring dobutamine prior to removal of LUE midline, apply cold compresses to LUE, and start on heparin gtt high intensity for treatment of DVT.  Notified nurse of plan.  Plan has been discussed with attending on call for HTS.          Michael Guallpa MD  Heart Transplant  [facility] - [facility]iology Stepdown

## 2022-09-22 NOTE — PROGRESS NOTES
Update    Pt presents as AAO x4, calm, cooperative, and asking and answering questions appropriately, Caregivers not present. Pt states he is doing well but having some pain which has been addressed by the medical team per patient. Pt state he has been worried a bit about the blood clot on his left arm. LCSW and Pt processed feelings and spoke about ways to re frame his negative thoughts. Pt had no additional questions or concerns. LCSW rounded with bedside RN. SW providing ongoing psychosocial, counseling, & emotional support, education, resources, assistance, and discharge planning as indicated.  SW to continue to follow.

## 2022-09-22 NOTE — PROGRESS NOTES
[facility] Stepdown  Heart Transplant  Progress Note    Patient Name: Audrey Oneil Jr.  MRN: [id number]  Admission Date: 9/14/2022  Hospital Length of Stay: 8 days  Attending Physician: Sree Perez MD  Primary Care Provider: Primary Doctor No  Principal Problem:Chronic combined systolic and diastolic congestive heart failure    Subjective:     Interval History: No acute issues overnight. HARRY 9/21 no signs of vegetation. Pt was found to have Lt brachial vein thrombosis and on Heparin currently. Plan for lead extraction early next wk.  Resumed PO diuretics 9/21. On Dobutamine 2.5 and Iv Vanco.  Continuous Infusions:   DOBUTamine IV infusion (non-titrating) 2.5 mcg/kg/min (09/21/22 2041)    heparin (porcine) in D5W 18 Units/kg/hr (09/21/22 2341)     Scheduled Meds:   acetaminophen  650 mg Oral QID    allopurinoL  200 mg Oral Daily    amiodarone  300 mg Oral Daily    aspirin  81 mg Oral Daily    atorvastatin  80 mg Oral Daily    furosemide  40 mg Oral Daily    LIDOcaine  1 patch Transdermal Q24H    sacubitriL-valsartan  1 tablet Oral BID    spironolactone  25 mg Oral Daily    vancomycin (VANCOCIN) IVPB  1,250 mg Intravenous Q24H     PRN Meds:fentaNYL, haloperidol lactate, heparin (PORCINE), heparin (PORCINE), HYDROcodone-acetaminophen, methocarbamoL, ondansetron, ondansetron, oxyCODONE, senna-docusate 8.6-50 mg, sodium chloride 0.9%, Pharmacy to dose Vancomycin consult **AND** vancomycin - pharmacy to dose    Review of patient's allergies indicates:   Allergen Reactions    Iodine containing multivitamin Swelling     itching    Keflex [cephalexin] Swelling     Eyes.  Tolerated multiple doses of zosyn and 1 dose of augmentin in 2015 and 2016, respectively    Peaches [peach (prunus persica)] Swelling     eyes    Shellfish containing products Swelling    Fig tree Swelling     itching    Tuberculin spenser test ppd Rash     Objective:     Vital Signs (Most Recent):  Temp: 97 °F (36.1 °C) (09/22/22 1236)  Pulse: 77  (09/22/22 1236)  Resp: 17 (09/22/22 1236)  BP: 119/73 (09/22/22 1236)  SpO2: 95 % (09/22/22 1236)   Vital Signs (24h Range):  Temp:  [97 °F (36.1 °C)-98.6 °F (37 °C)] 97 °F (36.1 °C)  Pulse:  [70-84] 77  Resp:  [14-20] 17  SpO2:  [95 %-100 %] 95 %  BP: ()/(51-73) 119/73     Patient Vitals for the past 72 hrs (Last 3 readings):   Weight   09/22/22 0500 95.9 kg (211 lb 6.7 oz)   09/21/22 1621 97.5 kg (215 lb)   09/21/22 0500 97.8 kg (215 lb 9.8 oz)       Body mass index is 33.11 kg/m².      Intake/Output Summary (Last 24 hours) at 9/22/2022 1307  Last data filed at 9/22/2022 0900  Gross per 24 hour   Intake 460 ml   Output 600 ml   Net -140 ml         Hemodynamic Parameters:       Telemetry: no events on tele    Physical Exam  Physical Exam  Constitutional:       Appearance: Normal appearance. He is not ill-appearing.   Cardiovascular:      Rate and Rhythm: Normal rate and regular rhythm.      Pulses: Normal pulses.      Heart sounds: Normal heart sounds.   Elevated JVD.  Pulmonary:      Effort: Pulmonary effort is normal.      Breath sounds: Normal breath sounds.   Abdominal:      General: Abdomen is flat. Bowel sounds are normal. There is distension.      Palpations: Abdomen is soft.      Tenderness: There is abdominal tenderness.   Musculoskeletal:         General: No swelling   1+ pitting edema in b/l LE upto knee. Mild tenderness around left upper arm, no fluctuance.  Skin:     General: Skin is warm and dry.      Findings: Erythema present.   Neurological:      Mental Status: He is alert and oriented to person, place, and time. Mental status is at baseline.   Psychiatric:         Behavior: Behavior normal.         Thought Content: Thought content normal.       Significant Labs:  CBC:  Recent Labs   Lab 09/21/22  0539 09/21/22  2309 09/22/22  0552   WBC 5.91 6.57 6.14   RBC 3.97* 4.11* 4.02*   HGB 11.3* 11.4* 11.2*   HCT 33.6* 34.7* 34.7*    242 228   MCV 85 84 86   MCH 28.5 27.7 27.9   MCHC 33.6 32.9  32.3       BNP:  No results for input(s): BNP in the last 168 hours.    Invalid input(s): BNPTRIAGELBLO  CMP:  Recent Labs   Lab 09/20/22  0508 09/21/22  0539 09/22/22  0552   GLU 97 98 95   CALCIUM 8.5* 8.5* 8.8   ALBUMIN 2.8* 2.7* 2.9*   PROT 6.0 5.8* 6.0   * 139 137   K 4.0 4.4 4.0   CO2 25 24 24    109 105   BUN 33* 25* 20   CREATININE 1.3 1.2 1.4   ALKPHOS 77 73 80   ALT 24 20 16   AST 22 19 16   BILITOT 0.4 0.5 0.6        Coagulation:   Recent Labs   Lab 09/21/22  2309 09/22/22  0552   INR 1.0  --    APTT 28.8 42.4*     LDH:  No results for input(s): LDH in the last 72 hours.  Microbiology:  Microbiology Results (last 7 days)       Procedure Component Value Units Date/Time    Blood culture [[id number]] Collected: 09/18/22 1105    Order Status: Completed Specimen: Blood from Antecubital, Right Arm Updated: 09/22/22 1212     Blood Culture, Routine No Growth to date      No Growth to date      No Growth to date      No Growth to date      No Growth to date    Blood culture [[id number]] Collected: 09/18/22 1117    Order Status: Completed Specimen: Blood from Peripheral, Right Hand Updated: 09/21/22 1412     Blood Culture, Routine No Growth to date      No Growth to date      No Growth to date      No Growth to date    Blood culture [[id number]]  (Abnormal) Collected: 09/16/22 0443    Order Status: Completed Specimen: Blood Updated: 09/19/22 0951     Blood Culture, Routine Gram stain aer bottle: Gram positive cocci in clusters resembling Staph      Positive results previously called 09/17/2022  05:34      METHICILLIN RESISTANT STAPHYLOCOCCUS AUREUS  ID consult required at [facility].  For susceptibility see order #[id number]      Narrative:      Lft wrist    Blood culture [[id number]]  (Abnormal)  (Susceptibility) Collected: 09/14/22 1302    Order Status: Completed Specimen: Blood from Wrist, Left Updated: 09/19/22 0734     Blood Culture, Routine Gram stain robert bottle: Gram  positive cocci      Positive results previously called 09/15/2022  02:59      METHICILLIN RESISTANT STAPHYLOCOCCUS AUREUS  ID consult required at [facility].       Comment: Previous comment was modified by JNR at 09:12 on 09/17/2022 ID   consult required at [facility].         Blood culture [[id number]]  (Abnormal) Collected: 09/16/22 0443    Order Status: Completed Specimen: Blood Updated: 09/18/22 0748     Blood Culture, Routine Gram stain aer bottle: Gram positive cocci in clusters resembling Staph      Results called to and read back by:Estrellita Eric RN 09/16/2022  22:55      METHICILLIN RESISTANT STAPHYLOCOCCUS AUREUS  ID consult required at [facility].  For susceptibility see order #[id number]      Narrative:      Rt hand    Blood culture [[id number]]  (Abnormal) Collected: 09/15/22 1142    Order Status: Completed Specimen: Blood Updated: 09/18/22 0748     Blood Culture, Routine Gram stain aer bottle: Gram positive cocci in clusters resembling Staph      Positive results previously called 09/16/2022  05:41      METHICILLIN RESISTANT STAPHYLOCOCCUS AUREUS  ID consult required at [facility].  For susceptibility see order #[id number]      Narrative:      Rt AC    Blood culture [[id number]]  (Abnormal) Collected: 09/15/22 1142    Order Status: Completed Specimen: Blood Updated: 09/18/22 0747     Blood Culture, Routine Gram stain aer bottle: Gram positive cocci in clusters resembling Staph      Results called to and read back by:Estrellita Eric RN  09/16/2022  05:03      METHICILLIN RESISTANT STAPHYLOCOCCUS AUREUS  ID consult required at [facility].  For susceptibility see order #[id number]      Narrative:      Rt wrist    Blood culture [[id number]]  (Abnormal) Collected: 09/14/22 1259    Order Status: Completed Specimen: Blood from Peripheral, Antecubital, Left Updated:  09/17/22 1005     Blood Culture, Routine Gram stain robert bottle: Gram positive cocci in clusters resembling Staph      Results called to and read back by: Gilbert Castle RN,  09/15/2022  00:50      Gram stain aer bottle: Gram positive cocci in clusters resembling Staph      Positive results previously called 09/15/2022  03:06      METHICILLIN RESISTANT STAPHYLOCOCCUS AUREUS  ID consult required at [facility].Critical access hospital,[place] and [facility].  For susceptibility see order #[id number]                  Estimated Creatinine Clearance: 54.2 mL/min (based on SCr of 1.4 mg/dL).    Diagnostic Results:      Assessment and Plan:     73 yo WM being worked up for LVAD since hospitalization January 2022 for recurrent VT (he thinks had MI) and cardiogenic shock at [facility]. He was  later seen in [facility] where he was treated for cardiogenic shock and sent home on .  He remains on  and is pursuing evaluation for LVAD.     His case was discussed at selection committee and he was deferred pending evaluation of pancreatic mass.  They wish to proceed with MRI but not sure with his ICD if this will be possible.     Meanwhile Presents with MRSA bacteremia at this admission.     HARRY 9/21/22: Not concerning for Vegetations.           * Chronic combined systolic and diastolic congestive heart failure  - Dobutamine 2.5 mcg/kg/min  - Continue with Entresto 24-26, mg BID  - restarted  po diueretics 9/21. ( lasix 40 OD  and aldactone 25 OD)  - Daily weights, Strict I/Os  - Monitor electrolytes and Maintain Mg >2 and K >4  -Telemetry monitoring         Pancreatic lesion  - Gastroenterology consult and recommendations appreciated  - Unable to obtain MRI for further evaluation given CRT-D  - Will hold off on obtaining CT with pancreatic protocol at this time in view of active infection requiring vancomycin and risk of contrast-induced nephrotoxicity in this patient     Bacteremia due to methicillin resistant Staphylococcus aureus  - Monitor WBC count  and fever curve, pan-culture if patient spikes  - ID consult and recommendations are appreciated  - Vancomycin 1,250, daily; Monitor level and renal panel  -patient has persistent positive blood cultures.  Cultures from 18 no growth so far.  His currently on vancomycin.  Id on board.  - HARRY 9/21 not concerning for any vegetations  -Lead extraction early next wk followed by eval of Pancreatic mass after cr stabilization for HT work up.  -will eventually need abx for 6 wks.     H/O ventricular tachycardia  - Amiodarone 300 mg, daily  - Monitor LFTs     Coronary artery disease involving native coronary artery of native heart without angina pectoris  - Asprin 81 mg, daily  - Atorvastatin 80 mg, nightly    Left Brachial Vein Thrombosis  - started on IV Heparin. Replaced left midline iv with rt arm iv line. Will plan to get PICC line as pt needs IV vanco along with IV dobutamine in addition to Heparin. After getting a PICC rt arm , will plan to monitor CVP and Mixed venous oxygen.     Benton Rendon MD  Heart Transplant  [facility] Step[facility]

## 2022-09-23 NOTE — PROGRESS NOTES
Clinical Cardiac Electrophysiology Follow-Up Note     Date:  9/23/2022  Requesting attending:  Sree Perez MD    Chief Complaint/Reason for Consultation:    Patient with MRSA bacteremia. EP consulted for device extraction     Problem List:   72 y.o.male with:     1. MRSA bacteremia   2. 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 Teofilo Moore , and upgrade to a [employer] CRT-D in 5/2019 by Dr. Teofilo Moore.   3. H/O ventricular tachycardia on amiodarone 300 mg daily  4. LBBB  5. New DVT located in L brachial vein STARTED on heparin gtt   6. CAD s/p STEMI s/p PCI LAD 2007  7. provoked PE/DVT in 2011  8. Former smoker    24 Hr Events and Subjective:     Yesterday:   CTS was consulted, pending consult note.     Overnight:  No events  Telemetry 9/21/22-9/22/22: V paced rhythm    Scheduled Meds:       acetaminophen  650 mg Oral QID    allopurinoL  200 mg Oral Daily    amiodarone  300 mg Oral Daily    aspirin  81 mg Oral Daily    atorvastatin  80 mg Oral Daily    furosemide  40 mg Oral Daily    LIDOcaine  1 patch Transdermal Q24H    sacubitriL-valsartan  1 tablet Oral BID    sodium chloride 0.9%  10 mL Intravenous Q6H    spironolactone  25 mg Oral Daily    vancomycin (VANCOCIN) IVPB  1,250 mg Intravenous Q24H       Continuous Infusions:      DOBUTamine IV infusion (non-titrating) 2.5 mcg/kg/min (09/21/22 2041)    heparin (porcine) in D5W 18 Units/kg/hr (09/22/22 1730)     PRN Meds:   fentaNYL, haloperidol lactate, heparin (PORCINE), heparin (PORCINE), HYDROcodone-acetaminophen, methocarbamoL, ondansetron, ondansetron, oxyCODONE, senna-docusate 8.6-50 mg, sodium chloride 0.9%, Flushing PICC Protocol **AND** sodium chloride 0.9% **AND** sodium chloride 0.9%, Pharmacy to dose Vancomycin consult **AND** vancomycin - pharmacy to dose    Allergies:     Review of patient's allergies indicates:   Allergen Reactions    Iodine containing multivitamin Swelling      itching    Keflex [cephalexin] Swelling     Eyes.  Tolerated multiple doses of zosyn and 1 dose of augmentin in 2015 and 2016, respectively    Peaches [peach (prunus persica)] Swelling     eyes    Shellfish containing products Swelling    Fig tree Swelling     itching    Tuberculin spenser test ppd Rash     Physical Exam:     Vitals:    09/23/22 0417   BP: (!) 118/56   Pulse: 74   Resp: 19   Temp: 98 °F (36.7 °C)     Eyes: Sclerae anicteric. Ears/nose/throat: Mucous membranes moist. Neck: No thyromegaly, lymphadenopathy, or jugular venous distention. Respiratory: Lungs clear to auscultation bilaterally. Cardiovascular: Heart was regular with normal first and second heart sounds. No S3 or S4. GI: Soft, nontender, nondistended, with normal bowel sounds. Musculoskeletal: extremities without cyanosis, clubbing or pitting edema. Neurologic: Patient is alert and oriented x3 and there is no focal strength deficit. Skin: no rashes, cuts, sores. Psychiatric: normal affect. Hematologic: no abnormal bruising or bleeding.    Laboratory Data:      BUN/Cr/glu/ALT/AST/amyl/lip:  20/1.4/--/16/16/--/-- (09/22 0552)  WBC/Hgb/Hct/Plts:  6.14/11.2/34.7/228 (09/22 0552)  PT/INR/PTT:  --/--/28.5 (09/22 1424)    9/21/22 HARRY:?   The left ventricle is normal in size with severely decreased systolic function. The estimated ejection fraction is 15%.  Normal right ventricular size with normal right ventricular systolic function.  Biatrial enlargement.  Mild-to-moderate mitral regurgitation.  Normal appearing left atrial appendage. No thrombus is present in the appendage.  There is a small nonmobile echodensity on the ventricular side of the non coronary cusp of the aortic valve. This is more consistent with aortic valve sclerosis than vegetation. Correlate clinically.  No gross vegetations visualized on any intracardiac leads.  Grade 2 plaque present in the descending aorta.    9/21/22 UE US: There is a catheter with associated DVT of the left  brachial vein.    Plan:   MRSA BACTEREMIA     PMH:  Ischemic cardipmyopathy CHFrEF LVEF 10-15% S/P  dual chamber ICD implantation in 11/16/2015 by Dr. Teofilo Moore , and upgrade to a [employer] CRT-D in 5/2019 by Dr. Teofilo Moore.    HARRY: small nonmobile echodensity on the ventricular side of the non coronary cusp of the aortic valve. This is more consistent with aortic valve sclerosis than vegetation    On: IV-Vancomycin    Plan:  -Patient with no history of CABG or sternal incision. Need CTS back-up for this case.   -Pending CT surgery consult. Placed yesterday  Will plan extraction tentatively for Tuesday   -Continue antibiotics per ID recs  -As per ID notes, the patient will need 6w of vancomycin (can switch to dapto if renal function worsens) from date of device removal.       Ramu Macedo   [facility]  PGY4 Cardiology Fellow

## 2022-09-23 NOTE — PROGRESS NOTES
[facility] - Cardiology Stepdown  Heart Transplant  Progress Note    Patient Name: Audrey Oneil Jr.  MRN: [id number]  Admission Date: 9/14/2022  Hospital Length of Stay: 9 days  Attending Physician: Sree Perez MD  Primary Care Provider: Primary Doctor No  Principal Problem:Chronic combined systolic and diastolic congestive heart failure    Patient Name: Audrey Oneil Jr.  MRN: [id number]  Admission Date: 9/14/2022  Hospital Length of Stay: 9 days  Attending Physician: Sree Perez MD  Primary Care Provider: Primary Doctor No  Principal Problem:Chronic combined systolic and diastolic congestive heart failure    Subjective:     Interval History: No acute issues overnight. HARRY 9/21 no signs of vegetation. Pt was found to have Lt brachial vein thrombosis 9/21 and on Heparin currently.   Resumed PO diuretics 9/21  Plan for ICD lead extraction Tuesday 9/27/22. Followed by eval of Pancreatic mass.       Continuous Infusions:   DOBUTamine IV infusion (non-titrating) 2.5 mcg/kg/min (09/23/22 0704)    heparin (porcine) in D5W 18 Units/kg/hr (09/23/22 1224)     Scheduled Meds:   acetaminophen  650 mg Oral QID    allopurinoL  200 mg Oral Daily    amiodarone  300 mg Oral Daily    aspirin  81 mg Oral Daily    atorvastatin  80 mg Oral Daily    furosemide  40 mg Oral Daily    LIDOcaine  1 patch Transdermal Q24H    sacubitriL-valsartan  1 tablet Oral BID    sodium chloride 0.9%  10 mL Intravenous Q6H    spironolactone  25 mg Oral Daily    vancomycin (VANCOCIN) IVPB  1,250 mg Intravenous Q24H     PRN Meds:fentaNYL, haloperidol lactate, heparin (PORCINE), heparin (PORCINE), HYDROcodone-acetaminophen, methocarbamoL, ondansetron, ondansetron, oxyCODONE, senna-docusate 8.6-50 mg, sodium chloride 0.9%, Flushing PICC Protocol **AND** sodium chloride 0.9% **AND** sodium chloride 0.9%, Pharmacy to dose Vancomycin consult **AND** vancomycin - pharmacy to dose    Review of patient's allergies indicates:   Allergen Reactions     Iodine containing multivitamin Swelling     itching    Keflex [cephalexin] Swelling     Eyes.  Tolerated multiple doses of zosyn and 1 dose of augmentin in 2015 and 2016, respectively    Peaches [peach (prunus persica)] Swelling     eyes    Shellfish containing products Swelling    Fig tree Swelling     itching    Tuberculin spenser test ppd Rash     Objective:     Vital Signs (Most Recent):  Temp: 98.3 °F (36.8 °C) (09/23/22 1221)  Pulse: 75 (09/23/22 1221)  Resp: 20 (09/23/22 1221)  BP: (!) 146/85 (09/23/22 1221)  SpO2: 98 % (09/23/22 1221)   Vital Signs (24h Range):  Temp:  [97.7 °F (36.5 °C)-98.3 °F (36.8 °C)] 98.3 °F (36.8 °C)  Pulse:  [68-94] 75  Resp:  [16-20] 20  SpO2:  [95 %-98 %] 98 %  BP: (118-146)/(56-85) 146/85     Patient Vitals for the past 72 hrs (Last 3 readings):   Weight   09/23/22 0500 95.7 kg (210 lb 15.7 oz)   09/22/22 0500 95.9 kg (211 lb 6.7 oz)   09/21/22 1621 97.5 kg (215 lb)       Body mass index is 33.04 kg/m².      Intake/Output Summary (Last 24 hours) at 9/23/2022 1455  Last data filed at 9/23/2022 0800  Gross per 24 hour   Intake --   Output 300 ml   Net -300 ml         Hemodynamic Parameters:       Telemetry: no events on tele    Physical Exam  Physical Exam  Constitutional:       Appearance: Normal appearance. He is not ill-appearing.   Cardiovascular:      Rate and Rhythm: Normal rate and regular rhythm.      Pulses: Normal pulses.      Heart sounds: Normal heart sounds.   Elevated JVD.  Pulmonary:      Effort: Pulmonary effort is normal.      Breath sounds: Normal breath sounds.   Abdominal:      General: Abdomen is flat. Bowel sounds are normal. There is distension.      Palpations: Abdomen is soft.      Tenderness: There is abdominal tenderness.   Musculoskeletal:         General: No swelling   1+ pitting edema in b/l LE upto knee. Mild tenderness around left upper arm, no fluctuance.  Skin:     General: Skin is warm and dry.      Findings: Erythema present.   Neurological:       Mental Status: He is alert and oriented to person, place, and time. Mental status is at baseline.   Psychiatric:         Behavior: Behavior normal.         Thought Content: Thought content normal.       Significant Labs:  CBC:  Recent Labs   Lab 09/21/22 2309 09/22/22  0552 09/23/22  0643   WBC 6.57 6.14 6.07   RBC 4.11* 4.02* 3.84*   HGB 11.4* 11.2* 10.8*   HCT 34.7* 34.7* 33.1*    228 257   MCV 84 86 86   MCH 27.7 27.9 28.1   MCHC 32.9 32.3 32.6       BNP:  No results for input(s): BNP in the last 168 hours.    Invalid input(s): BNPTRIAGELBLO  CMP:  Recent Labs   Lab 09/21/22 0539 09/22/22  0552 09/23/22  0643   GLU 98 95 108   CALCIUM 8.5* 8.8 8.6*   ALBUMIN 2.7* 2.9* 2.9*   PROT 5.8* 6.0 6.1    137 134*   K 4.4 4.0 3.7   CO2 24 24 24    105 101   BUN 25* 20 21   CREATININE 1.2 1.4 1.5*   ALKPHOS 73 80 80   ALT 20 16 16   AST 19 16 18   BILITOT 0.5 0.6 0.5        Coagulation:   Recent Labs   Lab 09/21/22 2309 09/22/22  0552 09/22/22  1424 09/23/22  0643   INR 1.0  --   --   --    APTT 28.8 42.4* 28.5 47.5*       LDH:  No results for input(s): LDH in the last 72 hours.  Microbiology:  Microbiology Results (last 7 days)       Procedure Component Value Units Date/Time    Blood culture [[id number]] Collected: 09/18/22 1117    Order Status: Completed Specimen: Blood from Peripheral, Right Hand Updated: 09/23/22 1412     Blood Culture, Routine No growth after 5 days.    Blood culture [[id number]] Collected: 09/18/22 1105    Order Status: Completed Specimen: Blood from Antecubital, Right Arm Updated: 09/23/22 1212     Blood Culture, Routine No growth after 5 days.    Blood culture [[id number]]  (Abnormal) Collected: 09/16/22 0443    Order Status: Completed Specimen: Blood Updated: 09/19/22 0951     Blood Culture, Routine Gram stain aer bottle: Gram positive cocci in clusters resembling Staph      Positive results previously called 09/17/2022  05:34      METHICILLIN RESISTANT STAPHYLOCOCCUS AUREUS  ID  consult required at [facility].  For susceptibility see order #[id number]      Narrative:      Lft wrist    Blood culture [[id number]]  (Abnormal)  (Susceptibility) Collected: 09/14/22 1302    Order Status: Completed Specimen: Blood from Wrist, Left Updated: 09/19/22 0734     Blood Culture, Routine Gram stain robert bottle: Gram positive cocci      Positive results previously called 09/15/2022  02:59      METHICILLIN RESISTANT STAPHYLOCOCCUS AUREUS  ID consult required at [facility].       Comment: Previous comment was modified by JNR at 09:12 on 09/17/2022 ID   consult required at [facility].         Blood culture [[id number]]  (Abnormal) Collected: 09/16/22 0443    Order Status: Completed Specimen: Blood Updated: 09/18/22 0748     Blood Culture, Routine Gram stain aer bottle: Gram positive cocci in clusters resembling Staph      Results called to and read back by:Estrellita Eric RN 09/16/2022  22:55      METHICILLIN RESISTANT STAPHYLOCOCCUS AUREUS  ID consult required at [facility].  For susceptibility see order #[id number]      Narrative:      Rt hand    Blood culture [[id number]]  (Abnormal) Collected: 09/15/22 1142    Order Status: Completed Specimen: Blood Updated: 09/18/22 0748     Blood Culture, Routine Gram stain aer bottle: Gram positive cocci in clusters resembling Staph      Positive results previously called 09/16/2022  05:41      METHICILLIN RESISTANT STAPHYLOCOCCUS AUREUS  ID consult required at [facility].  For susceptibility see order #[id number]      Narrative:      Rt AC    Blood culture [[id number]]  (Abnormal) Collected: 09/15/22 1142    Order Status: Completed Specimen: Blood Updated: 09/18/22 0747     Blood Culture, Routine Gram stain aer bottle: Gram positive cocci in clusters resembling Staph      Results called to and read back by:Estrellita Eric RN   09/16/2022  05:03      METHICILLIN RESISTANT STAPHYLOCOCCUS AUREUS  ID consult required at [facility].  For susceptibility see order #[id number]      Narrative:      Rt wrist    Blood culture [[id number]]  (Abnormal) Collected: 09/14/22 1259    Order Status: Completed Specimen: Blood from Peripheral, Antecubital, Left Updated: 09/17/22 1005     Blood Culture, Routine Gram stain robert bottle: Gram positive cocci in clusters resembling Staph      Results called to and read back by: Gilbert Castle RN,  09/15/2022  00:50      Gram stain aer bottle: Gram positive cocci in clusters resembling Staph      Positive results previously called 09/15/2022  03:06      METHICILLIN RESISTANT STAPHYLOCOCCUS AUREUS  ID consult required at [facility] and [facility].  For susceptibility see order #[id number]                  Estimated Creatinine Clearance: 50.5 mL/min (A) (based on SCr of 1.5 mg/dL (H)).    Diagnostic Results:      Assessment and Plan:     73 yo WM being worked up for LVAD since hospitalization January 2022 for recurrent VT (he thinks had MI) and cardiogenic shock at [facility]. He was  later seen in [facility] where he was treated for cardiogenic shock and sent home on .  He remains on  and is pursuing evaluation for LVAD.     His case was discussed at selection committee and he was deferred pending evaluation of pancreatic mass.  They wish to proceed with MRI but not sure with his ICD if this will be possible.     Meanwhile Presents with MRSA bacteremia at this admission.     HARRY 9/21/22: Not concerning for Vegetations. BCx negative 9/18           * Chronic combined systolic and diastolic congestive heart failure  - Dobutamine 2.5 mcg/kg/min  - Continue with Entresto 24-26, mg BID  - restarted  po diueretics 9/21. ( lasix 40 OD  and aldactone 25 OD)  - Daily weights, Strict I/Os  - Monitor electrolytes and Maintain Mg >2 and K >4  -Telemetry monitoring         Pancreatic lesion  -  Gastroenterology consult and recommendations appreciated  - Unable to obtain MRI for further evaluation given CRT-D  - Will hold off on obtaining CT with pancreatic protocol at this time in view of active infection requiring vancomycin and risk of contrast-induced nephrotoxicity in this patient     Bacteremia due to methicillin resistant Staphylococcus aureus  - Monitor WBC count and fever curve, pan-culture if patient spikes  - ID consult and recommendations are appreciated  - Vancomycin 1,250, daily; Monitor level and renal panel  -patient has persistent positive blood cultures.  Cultures from 18 no growth so far.  His currently on vancomycin.  Id on board.  - HARRY 9/21 not concerning for any vegetations  -Lead extraction early next wk followed by eval of Pancreatic mass after cr stabilization for HT work up.  -will eventually need abx for 6 wks following lead removal.     H/O ventricular tachycardia  - Amiodarone 300 mg, daily  - Monitor LFTs     Coronary artery disease involving native coronary artery of native heart without angina pectoris  - Asprin 81 mg, daily  - Atorvastatin 80 mg, nightly    Left Brachial Vein Thrombosis 9/21  - started on IV Heparin. Has a functional Rt arm PICC. CVP 7 today.     Benton Rendon MD  Heart Transplant  [facility] - Cardiology Stepdown

## 2022-09-23 NOTE — SUBJECTIVE & OBJECTIVE
Interval History: No acute issues overnight. HARRY 9/21 no signs of vegetation. Pt was found to have Lt brachial vein thrombosis 9/21 and on Heparin currently.   Resumed PO diuretics 9/21  Plan for ICD lead extraction Tuesday 9/27/22. Followed by eval of Pancreatic mass.       Continuous Infusions:   DOBUTamine IV infusion (non-titrating) 2.5 mcg/kg/min (09/23/22 0704)    heparin (porcine) in D5W 18 Units/kg/hr (09/23/22 1224)     Scheduled Meds:   acetaminophen  650 mg Oral QID    allopurinoL  200 mg Oral Daily    amiodarone  300 mg Oral Daily    aspirin  81 mg Oral Daily    atorvastatin  80 mg Oral Daily    furosemide  40 mg Oral Daily    LIDOcaine  1 patch Transdermal Q24H    sacubitriL-valsartan  1 tablet Oral BID    sodium chloride 0.9%  10 mL Intravenous Q6H    spironolactone  25 mg Oral Daily    vancomycin (VANCOCIN) IVPB  1,250 mg Intravenous Q24H     PRN Meds:fentaNYL, haloperidol lactate, heparin (PORCINE), heparin (PORCINE), HYDROcodone-acetaminophen, methocarbamoL, ondansetron, ondansetron, oxyCODONE, senna-docusate 8.6-50 mg, sodium chloride 0.9%, Flushing PICC Protocol **AND** sodium chloride 0.9% **AND** sodium chloride 0.9%, Pharmacy to dose Vancomycin consult **AND** vancomycin - pharmacy to dose    Review of patient's allergies indicates:   Allergen Reactions    Iodine containing multivitamin Swelling     itching    Keflex [cephalexin] Swelling     Eyes.  Tolerated multiple doses of zosyn and 1 dose of augmentin in 2015 and 2016, respectively    Peaches [peach (prunus persica)] Swelling     eyes    Shellfish containing products Swelling    Fig tree Swelling     itching    Tuberculin spenser test ppd Rash     Objective:     Vital Signs (Most Recent):  Temp: 98.3 °F (36.8 °C) (09/23/22 1221)  Pulse: 75 (09/23/22 1221)  Resp: 20 (09/23/22 1221)  BP: (!) 146/85 (09/23/22 1221)  SpO2: 98 % (09/23/22 1221)   Vital Signs (24h Range):  Temp:  [97.7 °F (36.5 °C)-98.3 °F (36.8 °C)] 98.3 °F (36.8 °C)  Pulse:   [68-94] 75  Resp:  [16-20] 20  SpO2:  [95 %-98 %] 98 %  BP: (118-146)/(56-85) 146/85     Patient Vitals for the past 72 hrs (Last 3 readings):   Weight   09/23/22 0500 95.7 kg (210 lb 15.7 oz)   09/22/22 0500 95.9 kg (211 lb 6.7 oz)   09/21/22 1621 97.5 kg (215 lb)       Body mass index is 33.04 kg/m².      Intake/Output Summary (Last 24 hours) at 9/23/2022 1455  Last data filed at 9/23/2022 0800  Gross per 24 hour   Intake --   Output 300 ml   Net -300 ml         Hemodynamic Parameters:       Telemetry: no events on tele    Physical Exam  Physical Exam  Constitutional:       Appearance: Normal appearance. He is not ill-appearing.   Cardiovascular:      Rate and Rhythm: Normal rate and regular rhythm.      Pulses: Normal pulses.      Heart sounds: Normal heart sounds.   Elevated JVD.  Pulmonary:      Effort: Pulmonary effort is normal.      Breath sounds: Normal breath sounds.   Abdominal:      General: Abdomen is flat. Bowel sounds are normal. There is distension.      Palpations: Abdomen is soft.      Tenderness: There is abdominal tenderness.   Musculoskeletal:         General: No swelling   1+ pitting edema in b/l LE upto knee. Mild tenderness around left upper arm, no fluctuance.  Skin:     General: Skin is warm and dry.      Findings: Erythema present.   Neurological:      Mental Status: He is alert and oriented to person, place, and time. Mental status is at baseline.   Psychiatric:         Behavior: Behavior normal.         Thought Content: Thought content normal.       Significant Labs:  CBC:  Recent Labs   Lab 09/21/22  2309 09/22/22  0552 09/23/22  0643   WBC 6.57 6.14 6.07   RBC 4.11* 4.02* 3.84*   HGB 11.4* 11.2* 10.8*   HCT 34.7* 34.7* 33.1*    228 257   MCV 84 86 86   MCH 27.7 27.9 28.1   MCHC 32.9 32.3 32.6       BNP:  No results for input(s): BNP in the last 168 hours.    Invalid input(s): BNPTRIAGELBLO  CMP:  Recent Labs   Lab 09/21/22  0539 09/22/22  0552 09/23/22  0643   GLU 98 95 108    CALCIUM 8.5* 8.8 8.6*   ALBUMIN 2.7* 2.9* 2.9*   PROT 5.8* 6.0 6.1    137 134*   K 4.4 4.0 3.7   CO2 24 24 24    105 101   BUN 25* 20 21   CREATININE 1.2 1.4 1.5*   ALKPHOS 73 80 80   ALT 20 16 16   AST 19 16 18   BILITOT 0.5 0.6 0.5        Coagulation:   Recent Labs   Lab 09/21/22  2309 09/22/22  0552 09/22/22  1424 09/23/22  0643   INR 1.0  --   --   --    APTT 28.8 42.4* 28.5 47.5*       LDH:  No results for input(s): LDH in the last 72 hours.  Microbiology:  Microbiology Results (last 7 days)       Procedure Component Value Units Date/Time    Blood culture [[id number]] Collected: 09/18/22 1117    Order Status: Completed Specimen: Blood from Peripheral, Right Hand Updated: 09/23/22 1412     Blood Culture, Routine No growth after 5 days.    Blood culture [[id number]] Collected: 09/18/22 1105    Order Status: Completed Specimen: Blood from Antecubital, Right Arm Updated: 09/23/22 1212     Blood Culture, Routine No growth after 5 days.    Blood culture [[id number]]  (Abnormal) Collected: 09/16/22 0443    Order Status: Completed Specimen: Blood Updated: 09/19/22 0951     Blood Culture, Routine Gram stain aer bottle: Gram positive cocci in clusters resembling Staph      Positive results previously called 09/17/2022  05:34      METHICILLIN RESISTANT STAPHYLOCOCCUS AUREUS  ID consult required at [facility] and [facility] locations.  For susceptibility see order #[id number]      Narrative:      Lft wrist    Blood culture [[id number]]  (Abnormal)  (Susceptibility) Collected: 09/14/22 1302    Order Status: Completed Specimen: Blood from Wrist, Left Updated: 09/19/22 0734     Blood Culture, Routine Gram stain robert bottle: Gram positive cocci      Positive results previously called 09/15/2022  02:59      METHICILLIN RESISTANT STAPHYLOCOCCUS AUREUS  ID consult required at [facility] and [facility] locations.       Comment: Previous comment was modified by KRISTINA at 09:12 on 09/17/2022 ID   consult required  at [facility].         Blood culture [[id number]]  (Abnormal) Collected: 09/16/22 0443    Order Status: Completed Specimen: Blood Updated: 09/18/22 0748     Blood Culture, Routine Gram stain aer bottle: Gram positive cocci in clusters resembling Staph      Results called to and read back by:Estrellita Eric RN 09/16/2022  22:55      METHICILLIN RESISTANT STAPHYLOCOCCUS AUREUS  ID consult required at [facility].  For susceptibility see order #[id number]      Narrative:      Rt hand    Blood culture [[id number]]  (Abnormal) Collected: 09/15/22 1142    Order Status: Completed Specimen: Blood Updated: 09/18/22 0748     Blood Culture, Routine Gram stain aer bottle: Gram positive cocci in clusters resembling Staph      Positive results previously called 09/16/2022  05:41      METHICILLIN RESISTANT STAPHYLOCOCCUS AUREUS  ID consult required at [facility].  For susceptibility see order #[id number]      Narrative:      Rt AC    Blood culture [[id number]]  (Abnormal) Collected: 09/15/22 1142    Order Status: Completed Specimen: Blood Updated: 09/18/22 0747     Blood Culture, Routine Gram stain aer bottle: Gram positive cocci in clusters resembling Staph      Results called to and read back by:Estrellita Eric RN  09/16/2022  05:03      METHICILLIN RESISTANT STAPHYLOCOCCUS AUREUS  ID consult required at [facility].  For susceptibility see order #[id number]      Narrative:      Rt wrist    Blood culture [[id number]]  (Abnormal) Collected: 09/14/22 1259    Order Status: Completed Specimen: Blood from Peripheral, Antecubital, Left Updated: 09/17/22 1005     Blood Culture, Routine Gram stain robert bottle: Gram positive cocci in clusters resembling Staph      Results called to and read back by: Gilbert Castle RN,  09/15/2022  00:50      Gram stain aer bottle: Gram positive cocci in clusters resembling Staph      Positive  results previously called 09/15/2022  03:06      METHICILLIN RESISTANT STAPHYLOCOCCUS AUREUS  ID consult required at [facility]rell[facility].  For susceptibility see order #[id number]                  Estimated Creatinine Clearance: 50.5 mL/min (A) (based on SCr of 1.5 mg/dL (H)).    Diagnostic Results:

## 2022-09-23 NOTE — CONSULTS
CTS aware of surgical back up need for this patient on Tuesday for lead extraction.  Dr. Zhao has a greed to be the surgeon available for this case that is tentatively planned for Tuesday, September 27, 2022.  Please contact [facility] for any further concerns.

## 2022-09-24 NOTE — SUBJECTIVE & OBJECTIVE
Interval History: No acute issues overnight.    Resumed PO diuretics 9/21  Plan for ICD lead extraction Tuesday 9/27/22. Followed by eval of Pancreatic mass.       Continuous Infusions:   DOBUTamine IV infusion (non-titrating) 2.5 mcg/kg/min (09/23/22 0704)    heparin (porcine) in D5W 18 Units/kg/hr (09/24/22 0522)     Scheduled Meds:   acetaminophen  650 mg Oral QID    allopurinoL  200 mg Oral Daily    amiodarone  300 mg Oral Daily    aspirin  81 mg Oral Daily    atorvastatin  80 mg Oral Daily    furosemide  40 mg Oral Daily    LIDOcaine  1 patch Transdermal Q24H    polyethylene glycol  17 g Oral Daily    sacubitriL-valsartan  1 tablet Oral BID    sodium chloride 0.9%  10 mL Intravenous Q6H    spironolactone  25 mg Oral Daily    vancomycin (VANCOCIN) IVPB  1,250 mg Intravenous Q24H     PRN Meds:dextromethorphan-guaiFENesin  mg, fentaNYL, haloperidol lactate, heparin (PORCINE), heparin (PORCINE), HYDROcodone-acetaminophen, methocarbamoL, ondansetron, ondansetron, oxyCODONE, senna-docusate 8.6-50 mg, sodium chloride 0.9%, Flushing PICC Protocol **AND** sodium chloride 0.9% **AND** sodium chloride 0.9%, Pharmacy to dose Vancomycin consult **AND** vancomycin - pharmacy to dose    Review of patient's allergies indicates:   Allergen Reactions    Iodine containing multivitamin Swelling     itching    Keflex [cephalexin] Swelling     Eyes.  Tolerated multiple doses of zosyn and 1 dose of augmentin in 2015 and 2016, respectively    Peaches [peach (prunus persica)] Swelling     eyes    Shellfish containing products Swelling    Fig tree Swelling     itching    Tuberculin spenser test ppd Rash     Objective:     Vital Signs (Most Recent):  Temp: 98.4 °F (36.9 °C) (09/24/22 0715)  Pulse: 79 (09/24/22 1153)  Resp: 20 (09/24/22 0715)  BP: (!) 94/53 (09/24/22 0715)  SpO2: 95 % (09/24/22 0715)   Vital Signs (24h Range):  Temp:  [96.5 °F (35.8 °C)-98.8 °F (37.1 °C)] 98.4 °F (36.9 °C)  Pulse:  [67-79] 79  Resp:  [16-20] 20  SpO2:   [95 %-96 %] 95 %  BP: ()/(53-62) 94/53     Patient Vitals for the past 72 hrs (Last 3 readings):   Weight   09/23/22 0500 95.7 kg (210 lb 15.7 oz)   09/22/22 0500 95.9 kg (211 lb 6.7 oz)   09/21/22 1621 97.5 kg (215 lb)       Body mass index is 33.04 kg/m².      Intake/Output Summary (Last 24 hours) at 9/24/2022 1254  Last data filed at 9/24/2022 0745  Gross per 24 hour   Intake 720 ml   Output 1200 ml   Net -480 ml         Hemodynamic Parameters:       Telemetry: no events on tele    Physical Exam  Physical Exam  Constitutional:       Appearance: Normal appearance. He is not ill-appearing.   Cardiovascular:      Rate and Rhythm: Normal rate and regular rhythm.      Pulses: Normal pulses.      Heart sounds: Normal heart sounds.   Elevated JVD.  Pulmonary:      Effort: Pulmonary effort is normal.      Breath sounds: Normal breath sounds.   Abdominal:      General: Abdomen is flat. Bowel sounds are normal. There is distension.      Palpations: Abdomen is soft.      Tenderness: There is abdominal tenderness.   Musculoskeletal:         General: No swelling   1+ pitting edema in b/l LE upto knee. Mild tenderness around left upper arm, no fluctuance.  Skin:     General: Skin is warm and dry.      Findings: Erythema present.   Neurological:      Mental Status: He is alert and oriented to person, place, and time. Mental status is at baseline.   Psychiatric:         Behavior: Behavior normal.         Thought Content: Thought content normal.       Significant Labs:  CBC:  Recent Labs   Lab 09/22/22  0552 09/23/22  0643 09/24/22  0521   WBC 6.14 6.07 5.18   RBC 4.02* 3.84* 3.88*   HGB 11.2* 10.8* 10.6*   HCT 34.7* 33.1* 32.9*    257 263   MCV 86 86 85   MCH 27.9 28.1 27.3   MCHC 32.3 32.6 32.2       BNP:  No results for input(s): BNP in the last 168 hours.    Invalid input(s): BNPTRIAGELBLO  CMP:  Recent Labs   Lab 09/22/22  0552 09/23/22  0643 09/24/22  0521   GLU 95 108 115*   CALCIUM 8.8 8.6* 8.4*   ALBUMIN  2.9* 2.9* 2.9*   PROT 6.0 6.1 6.2    134* 134*   K 4.0 3.7 3.7   CO2 24 24 24    101 101   BUN 20 21 22   CREATININE 1.4 1.5* 1.5*   ALKPHOS 80 80 78   ALT 16 16 19   AST 16 18 17   BILITOT 0.6 0.5 0.5        Coagulation:   Recent Labs   Lab 09/21/22  2309 09/22/22  0552 09/23/22  0643 09/23/22  1605 09/24/22  0521   INR 1.0  --   --   --   --    APTT 28.8   < > 47.5* 44.3* 46.7*    < > = values in this interval not displayed.       LDH:  No results for input(s): LDH in the last 72 hours.  Microbiology:  Microbiology Results (last 7 days)       Procedure Component Value Units Date/Time    Blood culture [[id number]] Collected: 09/18/22 1117    Order Status: Completed Specimen: Blood from Peripheral, Right Hand Updated: 09/23/22 1412     Blood Culture, Routine No growth after 5 days.    Blood culture [[id number]] Collected: 09/18/22 1105    Order Status: Completed Specimen: Blood from Antecubital, Right Arm Updated: 09/23/22 1212     Blood Culture, Routine No growth after 5 days.    Blood culture [[id number]]  (Abnormal) Collected: 09/16/22 0443    Order Status: Completed Specimen: Blood Updated: 09/19/22 0951     Blood Culture, Routine Gram stain aer bottle: Gram positive cocci in clusters resembling Staph      Positive results previously called 09/17/2022  05:34      METHICILLIN RESISTANT STAPHYLOCOCCUS AUREUS  ID consult required at [facility] and [facility].  For susceptibility see order #[id number]      Narrative:      Lft wrist    Blood culture [[id number]]  (Abnormal)  (Susceptibility) Collected: 09/14/22 1302    Order Status: Completed Specimen: Blood from Wrist, Left Updated: 09/19/22 0734     Blood Culture, Routine Gram stain robert bottle: Gram positive cocci      Positive results previously called 09/15/2022  02:59      METHICILLIN RESISTANT STAPHYLOCOCCUS AUREUS  ID consult required at [facility].       Comment: Previous comment was modified by KRISITNA at  09:12 on 09/17/2022 ID   consult required at [facility].         Blood culture [[id number]]  (Abnormal) Collected: 09/16/22 0443    Order Status: Completed Specimen: Blood Updated: 09/18/22 0748     Blood Culture, Routine Gram stain aer bottle: Gram positive cocci in clusters resembling Staph      Results called to and read back by:Estrellita Eric RN 09/16/2022  22:55      METHICILLIN RESISTANT STAPHYLOCOCCUS AUREUS  ID consult required at [facility].  For susceptibility see order #[id number]      Narrative:      Rt hand    Blood culture [[id number]]  (Abnormal) Collected: 09/15/22 1142    Order Status: Completed Specimen: Blood Updated: 09/18/22 0748     Blood Culture, Routine Gram stain aer bottle: Gram positive cocci in clusters resembling Staph      Positive results previously called 09/16/2022  05:41      METHICILLIN RESISTANT STAPHYLOCOCCUS AUREUS  ID consult required at [facility].  For susceptibility see order #[id number]      Narrative:      Rt AC    Blood culture [[id number]]  (Abnormal) Collected: 09/15/22 1142    Order Status: Completed Specimen: Blood Updated: 09/18/22 0747     Blood Culture, Routine Gram stain aer bottle: Gram positive cocci in clusters resembling Staph      Results called to and read back by:Estrellita Eric RN  09/16/2022  05:03      METHICILLIN RESISTANT STAPHYLOCOCCUS AUREUS  ID consult required at [facility].  For susceptibility see order #[id number]      Narrative:      Rt wrist                Estimated Creatinine Clearance: 50.5 mL/min (A) (based on SCr of 1.5 mg/dL (H)).    Diagnostic Results:

## 2022-09-24 NOTE — PROGRESS NOTES
[facility] - Cardiology Stepdown  Heart Transplant  Progress Note    Patient Name: Audrey Oneil Jr.  MRN: [id number]  Admission Date: 9/14/2022  Hospital Length of Stay: 10 days  Attending Physician: Sree Perez MD  Primary Care Provider: Primary Doctor No  Principal Problem:Chronic combined systolic and diastolic congestive heart failure    Subjective:     Interval History: No acute issues overnight.    Resumed PO diuretics 9/21  Plan for ICD lead extraction Tuesday 9/27/22. Followed by eval of Pancreatic mass      Continuous Infusions:   DOBUTamine IV infusion (non-titrating) 2.5 mcg/kg/min (09/23/22 0704)    heparin (porcine) in D5W 18 Units/kg/hr (09/24/22 0522)     Scheduled Meds:   acetaminophen  650 mg Oral QID    allopurinoL  200 mg Oral Daily    amiodarone  300 mg Oral Daily    aspirin  81 mg Oral Daily    atorvastatin  80 mg Oral Daily    furosemide  40 mg Oral Daily    LIDOcaine  1 patch Transdermal Q24H    polyethylene glycol  17 g Oral Daily    sacubitriL-valsartan  1 tablet Oral BID    sodium chloride 0.9%  10 mL Intravenous Q6H    spironolactone  25 mg Oral Daily    vancomycin (VANCOCIN) IVPB  1,250 mg Intravenous Q24H     PRN Meds:dextromethorphan-guaiFENesin  mg, fentaNYL, haloperidol lactate, heparin (PORCINE), heparin (PORCINE), HYDROcodone-acetaminophen, methocarbamoL, ondansetron, ondansetron, oxyCODONE, senna-docusate 8.6-50 mg, sodium chloride 0.9%, Flushing PICC Protocol **AND** sodium chloride 0.9% **AND** sodium chloride 0.9%, Pharmacy to dose Vancomycin consult **AND** vancomycin - pharmacy to dose    Review of patient's allergies indicates:   Allergen Reactions    Iodine containing multivitamin Swelling     itching    Keflex [cephalexin] Swelling     Eyes.  Tolerated multiple doses of zosyn and 1 dose of augmentin in 2015 and 2016, respectively    Peaches [peach (prunus persica)] Swelling     eyes    Shellfish containing products Swelling    Fig tree Swelling     itching     Tuberculin spenser test ppd Rash     Objective:     Vital Signs (Most Recent):  Temp: 98.4 °F (36.9 °C) (09/24/22 0715)  Pulse: 79 (09/24/22 1153)  Resp: 20 (09/24/22 0715)  BP: (!) 94/53 (09/24/22 0715)  SpO2: 95 % (09/24/22 0715)   Vital Signs (24h Range):  Temp:  [96.5 °F (35.8 °C)-98.8 °F (37.1 °C)] 98.4 °F (36.9 °C)  Pulse:  [67-79] 79  Resp:  [16-20] 20  SpO2:  [95 %-96 %] 95 %  BP: ()/(53-62) 94/53     Patient Vitals for the past 72 hrs (Last 3 readings):   Weight   09/23/22 0500 95.7 kg (210 lb 15.7 oz)   09/22/22 0500 95.9 kg (211 lb 6.7 oz)   09/21/22 1621 97.5 kg (215 lb)       Body mass index is 33.04 kg/m².      Intake/Output Summary (Last 24 hours) at 9/24/2022 1254  Last data filed at 9/24/2022 0745  Gross per 24 hour   Intake 720 ml   Output 1200 ml   Net -480 ml                Telemetry: no events on tele    Physical Exam  Physical Exam  Constitutional:       Appearance: Normal appearance. He is not ill-appearing.   Cardiovascular:      Rate and Rhythm: Normal rate and regular rhythm.      Pulses: Normal pulses.      Heart sounds: Normal heart sounds.   Elevated JVD.  Pulmonary:      Effort: Pulmonary effort is normal.      Breath sounds: Normal breath sounds.   Abdominal:      General: Abdomen is flat. Bowel sounds are normal. There is distension.      Palpations: Abdomen is soft.      Tenderness: There is abdominal tenderness.   Musculoskeletal:         General: No swelling   1+ pitting edema in b/l LE upto knee. Mild tenderness around left upper arm, no fluctuance.  Skin:     General: Skin is warm and dry.      Findings: Erythema present.   Neurological:      Mental Status: He is alert and oriented to person, place, and time. Mental status is at baseline.   Psychiatric:         Behavior: Behavior normal.         Thought Content: Thought content normal.       Significant Labs:  CBC:  Recent Labs   Lab 09/22/22  0552 09/23/22  0643 09/24/22  0521   WBC 6.14 6.07 5.18   RBC 4.02* 3.84* 3.88*    HGB 11.2* 10.8* 10.6*   HCT 34.7* 33.1* 32.9*    257 263   MCV 86 86 85   MCH 27.9 28.1 27.3   MCHC 32.3 32.6 32.2       BNP:  No results for input(s): BNP in the last 168 hours.    Invalid input(s): BNPTRIAGELBLO  CMP:  Recent Labs   Lab 09/22/22  0552 09/23/22  0643 09/24/22  0521   GLU 95 108 115*   CALCIUM 8.8 8.6* 8.4*   ALBUMIN 2.9* 2.9* 2.9*   PROT 6.0 6.1 6.2    134* 134*   K 4.0 3.7 3.7   CO2 24 24 24    101 101   BUN 20 21 22   CREATININE 1.4 1.5* 1.5*   ALKPHOS 80 80 78   ALT 16 16 19   AST 16 18 17   BILITOT 0.6 0.5 0.5        Coagulation:   Recent Labs   Lab 09/21/22  2309 09/22/22  0552 09/23/22  0643 09/23/22  1605 09/24/22  0521   INR 1.0  --   --   --   --    APTT 28.8   < > 47.5* 44.3* 46.7*    < > = values in this interval not displayed.       LDH:  No results for input(s): LDH in the last 72 hours.  Microbiology:  Microbiology Results (last 7 days)       Procedure Component Value Units Date/Time    Blood culture [[id number]] Collected: 09/18/22 1117    Order Status: Completed Specimen: Blood from Peripheral, Right Hand Updated: 09/23/22 1412     Blood Culture, Routine No growth after 5 days.    Blood culture [[id number]] Collected: 09/18/22 1105    Order Status: Completed Specimen: Blood from Antecubital, Right Arm Updated: 09/23/22 1212     Blood Culture, Routine No growth after 5 days.    Blood culture [[id number]]  (Abnormal) Collected: 09/16/22 0443    Order Status: Completed Specimen: Blood Updated: 09/19/22 0951     Blood Culture, Routine Gram stain aer bottle: Gram positive cocci in clusters resembling Staph      Positive results previously called 09/17/2022  05:34      METHICILLIN RESISTANT STAPHYLOCOCCUS AUREUS  ID consult required at [facility].[place] locations.  For susceptibility see order #[id number]  -    Narrative:      Lft wrist    Blood culture [[id number]]  (Abnormal)  (Susceptibility) Collected: 09/14/22 1302    Order Status: Completed Specimen:  Blood from Wrist, Left Updated: 09/19/22 0734     Blood Culture, Routine Gram stain robert bottle: Gram positive cocci      Positive results previously called 09/15/2022  02:59      METHICILLIN RESISTANT STAPHYLOCOCCUS AUREUS  ID consult required at [facility].  -     Comment: Previous comment was modified by JNR at 09:12 on 09/17/2022 ID   consult required at [facility] and [facility].         Blood culture [[id number]]  (Abnormal) Collected: 09/16/22 0443    Order Status: Completed Specimen: Blood Updated: 09/18/22 0748     Blood Culture, Routine Gram stain aer bottle: Gram positive cocci in clusters resembling Staph      Results called to and read back by:Estrellita Eric RN 09/16/2022  22:55      METHICILLIN RESISTANT STAPHYLOCOCCUS AUREUS  ID consult required at [facility] and [facility].  For susceptibility see order #[id number]  -    Narrative:      Rt hand    Blood culture [[id number]]  (Abnormal) Collected: 09/15/22 1142    Order Status: Completed Specimen: Blood Updated: 09/18/22 0748     Blood Culture, Routine Gram stain aer bottle: Gram positive cocci in clusters resembling Staph      Positive results previously called 09/16/2022  05:41      METHICILLIN RESISTANT STAPHYLOCOCCUS AUREUS  ID consult required at [facility] and [facility].  For susceptibility see order #[id number]  -    Narrative:      Rt AC    Blood culture [[id number]]  (Abnormal) Collected: 09/15/22 1142    Order Status: Completed Specimen: Blood Updated: 09/18/22 0747     Blood Culture, Routine Gram stain aer bottle: Gram positive cocci in clusters resembling Staph      Results called to and read back by:Estrellita Eric RN  09/16/2022  05:03      METHICILLIN RESISTANT STAPHYLOCOCCUS AUREUS  ID consult required at [facility].  For susceptibility see order #[id number]  -    Narrative:      Rt wrist                Estimated Creatinine Clearance:  50.5 mL/min (A) (based on SCr of 1.5 mg/dL (H)).          Assessment and Plan:     73 yo WM being worked up for LVAD since hospitalization January 2022 for recurrent VT (he thinks had MI) and cardiogenic shock at [facility]. He was  later seen in [facility] where he was treated for cardiogenic shock and sent home on .  He remains on  and is pursuing evaluation for LVAD.     His case was discussed at selection committee and he was deferred pending evaluation of pancreatic mass.  They wish to proceed with MRI but not sure with his ICD if this will be possible.     Meanwhile Presents with MRSA bacteremia at this admission.     HARRY 9/21/22: Not concerning for Vegetations. BCx negative 9/18           * Chronic combined systolic and diastolic congestive heart failure  - Dobutamine 2.5 mcg/kg/min  - Continue with Entresto 24-26, mg BID  - restarted  po diueretics 9/21. ( lasix 40 OD  and aldactone 25 OD)  - Daily weights, Strict I/Os  - Monitor electrolytes and Maintain Mg >2 and K >4  -Telemetry monitoring           Pancreatic lesion  - Gastroenterology consult and recommendations appreciated  - Unable to obtain MRI for further evaluation given CRT-D  - Will hold off on obtaining CT with pancreatic protocol at this time in view of active infection requiring vancomycin and risk of contrast-induced nephrotoxicity in this patient     Bacteremia due to methicillin resistant Staphylococcus aureus  - Monitor WBC count and fever curve, pan-culture if patient spikes  - ID consult and recommendations are appreciated  - Vancomycin 1,250, daily; Monitor level and renal panel  -patient has persistent positive blood cultures.  Cultures from 18 no growth so far.  His currently on vancomycin.  Id on board.  - HARRY 9/21 not concerning for any vegetations  -Lead extraction early next wk followed by eval of Pancreatic mass after cr stabilization for HT work up.  -will eventually need abx for 6 wks following lead removal.     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 Has a functional Rt arm PICC.    Benton Rendon MD  Heart Transplant  [facility] - Cardiology Stepdown

## 2022-09-24 NOTE — PROGRESS NOTES
Pharmacokinetic Assessment Follow Up: IV Vancomycin    Vancomycin Regimen Plan:    SCr remains 1.5mg/d, stable from yesterday but slightly up from 09/21 (1.2mg/dL). Continue vancomycin 1250mg IV q24h but given the level was 18 yesterday will recheck a trough level tomorrow 09/25/22 at 16:30h to check for accumulation given SCr is elevated.     Drug levels (last 3 results):  Recent Labs   Lab Result Units 09/23/22  1535   Vancomycin-Trough ug/mL 18.5       Pharmacy will continue to follow and monitor vancomycin.    Please contact pharmacy at extension [phone] for questions regarding this assessment.    Thank you for the consult,   Cathie Nixon, PharmD, BCPS, BCCP Cardiology Clinical Pharmacy Specialist  EXT [phone]       Patient brief summary:  Audrey Oneil Jr. is a 72 y.o. male initiated on antimicrobial therapy with IV Vancomycin for treatment of bacteremia    The patient's current regimen is vancomycin 1250mg IV q24h     Drug Allergies:   Review of patient's allergies indicates:   Allergen Reactions    Iodine containing multivitamin Swelling     itching    Keflex [cephalexin] Swelling     Eyes.  Tolerated multiple doses of zosyn and 1 dose of augmentin in 2015 and 2016, respectively    Peaches [peach (prunus persica)] Swelling     eyes    Shellfish containing products Swelling    Fig tree Swelling     itching    Tuberculin spenser test ppd Rash       Actual Body Weight:   95.7kg    Renal Function:   Estimated Creatinine Clearance: 50.5 mL/min (A) (based on SCr of 1.5 mg/dL (H)).,     Dialysis Method (if applicable):  N/A    CBC (last 72 hours):  Recent Labs   Lab Result Units 09/21/22  2309 09/22/22  0552 09/23/22  0643 09/24/22  0521   WBC K/uL 6.57 6.14 6.07 5.18   Hemoglobin g/dL 11.4* 11.2* 10.8* 10.6*   Hematocrit % 34.7* 34.7* 33.1* 32.9*   Platelets K/uL 242 228 257 263   Gran % % 67.1 63.9 59.7 63.3   Lymph % % 21.8 23.8 29.3 26.3   Mono % % 8.1 8.8 7.6 7.3   Eosinophil % % 1.2 1.6 1.8 1.7    Basophil % % 0.3 0.3 0.3 0.2   Differential Method  Automated Automated Automated Automated       Metabolic Panel (last 72 hours):  Recent Labs   Lab Result Units 09/22/22  0552 09/23/22  0643 09/24/22  0521   Sodium mmol/L 137 134* 134*   Potassium mmol/L 4.0 3.7 3.7   Chloride mmol/L 105 101 101   CO2 mmol/L 24 24 24   Glucose mg/dL 95 108 115*   BUN mg/dL 20 21 22   Creatinine mg/dL 1.4 1.5* 1.5*   Albumin g/dL 2.9* 2.9* 2.9*   Total Bilirubin mg/dL 0.6 0.5 0.5   Alkaline Phosphatase U/L 80 80 78   AST U/L 16 18 17   ALT U/L 16 16 19   Magnesium mg/dL 1.8 1.8 1.8       Vancomycin Administrations:  vancomycin given in the last 96 hours                     vancomycin 1.25 g in dextrose 5% 250 mL IVPB (ready to mix) (mg) 1,250 mg New Bag 09/23/22 1719     1,250 mg New Bag 09/22/22 1736     1,250 mg New Bag 09/21/22 1852     1,250 mg New Bag 09/20/22 1404                    Microbiologic Results:  Microbiology Results (last 7 days)       Procedure Component Value Units Date/Time    Blood culture [[id number]] Collected: 09/18/22 1117    Order Status: Completed Specimen: Blood from Peripheral, Right Hand Updated: 09/23/22 1412     Blood Culture, Routine No growth after 5 days.    Blood culture [[id number]] Collected: 09/18/22 1105    Order Status: Completed Specimen: Blood from Antecubital, Right Arm Updated: 09/23/22 1212     Blood Culture, Routine No growth after 5 days.    Blood culture [[id number]]  (Abnormal) Collected: 09/16/22 0443    Order Status: Completed Specimen: Blood Updated: 09/19/22 0951     Blood Culture, Routine Gram stain aer bottle: Gram positive cocci in clusters resembling Staph      Positive results previously called 09/17/2022  05:34      METHICILLIN RESISTANT STAPHYLOCOCCUS AUREUS  ID consult required at [facility] and [facility].  For susceptibility see order #[id number]      Narrative:      Lft wrist    Blood culture [[id number]]  (Abnormal)  (Susceptibility) Collected: 09/14/22  1302    Order Status: Completed Specimen: Blood from Wrist, Left Updated: 09/19/22 0734     Blood Culture, Routine Gram stain robert bottle: Gram positive cocci      Positive results previously called 09/15/2022  02:59      METHICILLIN RESISTANT STAPHYLOCOCCUS AUREUS  ID consult required at [facility]       Comment: Previous comment was modified by JNR at 09:12 on 09/17/2022 ID   consult required at [facility] and [facility].         Blood culture [[id number]]  (Abnormal) Collected: 09/16/22 0443    Order Status: Completed Specimen: Blood Updated: 09/18/22 0748     Blood Culture, Routine Gram stain aer bottle: Gram positive cocci in clusters resembling Staph      Results called to and read back by:Estrellita Eric RN 09/16/2022  22:55      METHICILLIN RESISTANT STAPHYLOCOCCUS AUREUS  ID consult required at [facility] and [facility].  For susceptibility see order #[id number]      Narrative:      Rt hand    Blood culture [[id number]]  (Abnormal) Collected: 09/15/22 1142    Order Status: Completed Specimen: Blood Updated: 09/18/22 0748     Blood Culture, Routine Gram stain aer bottle: Gram positive cocci in clusters resembling Staph      Positive results previously called 09/16/2022  05:41      METHICILLIN RESISTANT STAPHYLOCOCCUS AUREUS  ID consult required at [facility] and [facility].  For susceptibility see order #[id number]      Narrative:      Rt AC    Blood culture [[id number]]  (Abnormal) Collected: 09/15/22 1142    Order Status: Completed Specimen: Blood Updated: 09/18/22 0747     Blood Culture, Routine Gram stain aer bottle: Gram positive cocci in clusters resembling Staph      Results called to and read back by:Estrellita Eric RN  09/16/2022  05:03      METHICILLIN RESISTANT STAPHYLOCOCCUS AUREUS  ID consult required at [facility].  For susceptibility see order #[id number]      Narrative:      Rt wrist

## 2022-09-24 NOTE — PLAN OF CARE
Clinical Cardiac Electrophysiology Follow-Up Note     Date:  9/24/2022  Requesting attending:  Sree Perez MD    Chief Complaint/Reason for Consultation:    Patient with MRSA bacteremia. EP consulted for device extraction     Problem List:   72 y.o.male with:     1. MRSA bacteremia   2. 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 Teofilo Moore , and upgrade to a [employer] CRT-D in 5/2019 by Dr. Teofilo Moore.   3. H/O ventricular tachycardia on amiodarone 300 mg daily  4. LBBB  5. New DVT located in L brachial vein STARTED on heparin gtt   6. CAD s/p STEMI s/p PCI LAD 2007  7. provoked PE/DVT in 2011  8. Former smoker     HARRY no vegetations  24 Hr Events and Subjective:   Overnight:  No events    Telemetry : V paced rhythm    Scheduled Meds:       acetaminophen  650 mg Oral QID    allopurinoL  200 mg Oral Daily    amiodarone  300 mg Oral Daily    aspirin  81 mg Oral Daily    atorvastatin  80 mg Oral Daily    furosemide  40 mg Oral Daily    LIDOcaine  1 patch Transdermal Q24H    polyethylene glycol  17 g Oral Daily    sacubitriL-valsartan  1 tablet Oral BID    sodium chloride 0.9%  10 mL Intravenous Q6H    spironolactone  25 mg Oral Daily    vancomycin (VANCOCIN) IVPB  1,250 mg Intravenous Q24H       Plan:    MRSA BACTEREMIA      PMH:  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 Teofilo Moore , and upgrade to a [employer] CRT-D in 5/2019 by Dr. Teofilo Moore.     HARRY: small nonmobile echodensity on the ventricular side of the non coronary cusp of the aortic valve. This is more consistent with aortic valve sclerosis than vegetation     On: IV-Vancomycin     Plan:  -Patient with no history of CABG or sternal incision. Need CTS back-up for this case.   -Will plan extraction tentatively for Tuesday     Left Brachial Vein Thrombosis 9/21    [facility]  PGY4 Cardiology  Fellow

## 2022-09-24 NOTE — PROGRESS NOTES
Pharmacokinetic Assessment Follow Up: IV Vancomycin    Vancomycin serum concentration assessment(s):  The trough level was drawn correctly and can be used to guide therapy at this time. The measurement is within the desired definitive target range of 15 to 20 mcg/mL.  Scr up slightly today to 1.5 mg/dl    Vancomycin Regimen Plan:  Continue regimen of Vancomycin 1250 mg IV every 24 hours for now  Plan for repeat trough in 4 days - Tuesday 9/27  Monitor kidney function closely and obtain trough earlier if Scr continues to increase      Drug levels (last 3 results):  Recent Labs   Lab Result Units 09/23/22  1535   Vancomycin-Trough ug/mL 18.5       Pharmacy will continue to follow and monitor vancomycin.    Please contact pharmacy at extension [phone] for questions regarding this assessment.    Thank you for the consult,   Riddhi Velasquez       Patient brief summary:  Audrey Oneil Jr. is a 72 y.o. male initiated on antimicrobial therapy with IV Vancomycin for treatment of  MRSA bacteremia    The patient's current regimen is 1250mg IV every 24 hours    Drug Allergies:   Review of patient's allergies indicates:   Allergen Reactions    Iodine containing multivitamin Swelling     itching    Keflex [cephalexin] Swelling     Eyes.  Tolerated multiple doses of zosyn and 1 dose of augmentin in 2015 and 2016, respectively    Peaches [peach (prunus persica)] Swelling     eyes    Shellfish containing products Swelling    Fig tree Swelling     itching    Tuberculin spenser test ppd Rash       Actual Body Weight:   95.7 kg    Renal Function:   Estimated Creatinine Clearance: 50.5 mL/min (A) (based on SCr of 1.5 mg/dL (H)).,     Dialysis Method (if applicable):  N/A    CBC (last 72 hours):  Recent Labs   Lab Result Units 09/21/22  0539 09/21/22  2309 09/22/22  0552 09/23/22  0643   WBC K/uL 5.91 6.57 6.14 6.07   Hemoglobin g/dL 11.3* 11.4* 11.2* 10.8*   Hematocrit % 33.6* 34.7* 34.7* 33.1*   Platelets K/uL 191 242 228 257   Gran % % 64.2  67.1 63.9 59.7   Lymph % % 23.4 21.8 23.8 29.3   Mono % % 7.8 8.1 8.8 7.6   Eosinophil % % 2.2 1.2 1.6 1.8   Basophil % % 0.5 0.3 0.3 0.3   Differential Method  Automated Automated Automated Automated       Metabolic Panel (last 72 hours):  Recent Labs   Lab Result Units 09/21/22  0539 09/22/22  0552 09/23/22  0643   Sodium mmol/L 139 137 134*   Potassium mmol/L 4.4 4.0 3.7   Chloride mmol/L 109 105 101   CO2 mmol/L 24 24 24   Glucose mg/dL 98 95 108   BUN mg/dL 25* 20 21   Creatinine mg/dL 1.2 1.4 1.5*   Albumin g/dL 2.7* 2.9* 2.9*   Total Bilirubin mg/dL 0.5 0.6 0.5   Alkaline Phosphatase U/L 73 80 80   AST U/L 19 16 18   ALT U/L 20 16 16   Magnesium mg/dL 2.0 1.8 1.8       Vancomycin Administrations:  vancomycin given in the last 96 hours                     vancomycin 1.25 g in dextrose 5% 250 mL IVPB (ready to mix) (mg) 1,250 mg New Bag 09/23/22 1719     1,250 mg New Bag 09/22/22 1736     1,250 mg New Bag 09/21/22 1852     1,250 mg New Bag 09/20/22 1404                    Microbiologic Results:  Microbiology Results (last 7 days)       Procedure Component Value Units Date/Time    Blood culture [[id number]] Collected: 09/18/22 1117    Order Status: Completed Specimen: Blood from Peripheral, Right Hand Updated: 09/23/22 1412     Blood Culture, Routine No growth after 5 days.    Blood culture [[id number]] Collected: 09/18/22 1105    Order Status: Completed Specimen: Blood from Antecubital, Right Arm Updated: 09/23/22 1212     Blood Culture, Routine No growth after 5 days.    Blood culture [[id number]]  (Abnormal) Collected: 09/16/22 0443    Order Status: Completed Specimen: Blood Updated: 09/19/22 0951     Blood Culture, Routine Gram stain aer bottle: Gram positive cocci in clusters resembling Staph      Positive results previously called 09/17/2022  05:34      METHICILLIN RESISTANT STAPHYLOCOCCUS AUREUS  ID consult required at [facility].Daniel,Leigh Ann and Nancy orellana.  For susceptibility see order #[id number]       Narrative:      Lft wrist    Blood culture [[id number]]  (Abnormal)  (Susceptibility) Collected: 09/14/22 1302    Order Status: Completed Specimen: Blood from Wrist, Left Updated: 09/19/22 0734     Blood Culture, Routine Gram stain robert bottle: Gram positive cocci      Positive results previously called 09/15/2022  02:59      METHICILLIN RESISTANT STAPHYLOCOCCUS AUREUS  ID consult required at [facility] and [facility].       Comment: Previous comment was modified by JNR at 09:12 on 09/17/2022 ID   consult required at [facility] and [facility].         Blood culture [[id number]]  (Abnormal) Collected: 09/16/22 0443    Order Status: Completed Specimen: Blood Updated: 09/18/22 0748     Blood Culture, Routine Gram stain aer bottle: Gram positive cocci in clusters resembling Staph      Results called to and read back by:Estrellita Eric RN 09/16/2022  22:55      METHICILLIN RESISTANT STAPHYLOCOCCUS AUREUS  ID consult required at [facility] and [facility].  For susceptibility see order #[id number]      Narrative:      Rt hand    Blood culture [[id number]]  (Abnormal) Collected: 09/15/22 1142    Order Status: Completed Specimen: Blood Updated: 09/18/22 0748     Blood Culture, Routine Gram stain aer bottle: Gram positive cocci in clusters resembling Staph      Positive results previously called 09/16/2022  05:41      METHICILLIN RESISTANT STAPHYLOCOCCUS AUREUS  ID consult required at [facility] and [facility].  For susceptibility see order #[id number]      Narrative:      Rt AC    Blood culture [[id number]]  (Abnormal) Collected: 09/15/22 1142    Order Status: Completed Specimen: Blood Updated: 09/18/22 0747     Blood Culture, Routine Gram stain aer bottle: Gram positive cocci in clusters resembling Staph      Results called to and read back by:Estrellita Eric RN  09/16/2022  05:03      METHICILLIN RESISTANT STAPHYLOCOCCUS AUREUS  ID consult required at [facility]  and [facility].  For susceptibility see order #[id number]      Narrative:      Rt wrist    Blood culture [[id number]]  (Abnormal) Collected: 09/14/22 1259    Order Status: Completed Specimen: Blood from Peripheral, Antecubital, Left Updated: 09/17/22 1005     Blood Culture, Routine Gram stain robert bottle: Gram positive cocci in clusters resembling Staph      Results called to and read back by: Gilbert Castle RN,  09/15/2022  00:50      Gram stain aer bottle: Gram positive cocci in clusters resembling Staph      Positive results previously called 09/15/2022  03:06      METHICILLIN RESISTANT STAPHYLOCOCCUS AUREUS  ID consult required at [facility] and [facility] locations.  For susceptibility see order #[id number]

## 2022-09-25 NOTE — NURSING
"Patient Called me in the room to tell me he does not feel good on the higher rate of Dobutamine. Patient is was running at 5 mcg/kg/per. Now actually  running at 2.5 now. Patient requested me to turn it back down. He stated " he could guzman the medication in his mouth, he was feeling dizzy, and overall feels bad. Called the Md to let them know what was going on and told them we turned down the mediation to what he was on prior. HTS MD aware also.   "

## 2022-09-25 NOTE — PROGRESS NOTES
"Pharmacokinetic Assessment Follow Up: IV Vancomycin    Vancomycin Regimen Plan:    SCr continues to rise from 1.5 to 1.8mg/dL, urine output has decreased with only 700mL recorded yesterday.  Discontinue the scheduled vancomycin regimen   Proceed with the "trough" level today at 15:30h 60 minutes when the next dose would have been due. Redose per level and renal function.     Drug levels (last 3 results):  Recent Labs   Lab Result Units 09/23/22  1535   Vancomycin-Trough ug/mL 18.5       Pharmacy will continue to follow and monitor vancomycin.    Please contact pharmacy at extension [phone] for questions regarding this assessment.    Thank you for the consult,   Cathie Nixon, PharmD, BCPS, BCCP Cardiology Clinical Pharmacy Specialist  EXT [phone]       Patient brief summary:  Audrey Oneil Jr. is a 72 y.o. male initiated on antimicrobial therapy with IV Vancomycin for treatment of bacteremia    The patient's current regimen was vancomycin 1250mg IV q24h     Drug Allergies:   Review of patient's allergies indicates:   Allergen Reactions    Iodine containing multivitamin Swelling     itching    Keflex [cephalexin] Swelling     Eyes.  Tolerated multiple doses of zosyn and 1 dose of augmentin in 2015 and 2016, respectively    Peaches [peach (prunus persica)] Swelling     eyes    Shellfish containing products Swelling    Fig tree Swelling     itching    Tuberculin spenser test ppd Rash       Actual Body Weight:   94.4kg    Renal Function:   Estimated Creatinine Clearance: 41.9 mL/min (A) (based on SCr of 1.8 mg/dL (H)).,     Dialysis Method (if applicable):  N/A    CBC (last 72 hours):  Recent Labs   Lab Result Units 09/23/22  0643 09/24/22  0521 09/25/22  0427   WBC K/uL 6.07 5.18 5.25   Hemoglobin g/dL 10.8* 10.6* 10.2*   Hematocrit % 33.1* 32.9* 32.4*   Platelets K/uL 257 263 288   Gran % % 59.7 63.3 62.5   Lymph % % 29.3 26.3 26.9   Mono % % 7.6 7.3 7.2   Eosinophil % % 1.8 1.7 1.7   Basophil % % 0.3 0.2 " 0.4   Differential Method  Automated Automated Automated       Metabolic Panel (last 72 hours):  Recent Labs   Lab Result Units 09/23/22  0643 09/24/22  0521 09/25/22  0427   Sodium mmol/L 134* 134* 134*   Potassium mmol/L 3.7 3.7 4.1   Chloride mmol/L 101 101 100   CO2 mmol/L 24 24 27   Glucose mg/dL 108 115* 89   BUN mg/dL 21 22 24*   Creatinine mg/dL 1.5* 1.5* 1.8*   Albumin g/dL 2.9* 2.9* 3.0*   Total Bilirubin mg/dL 0.5 0.5 0.3   Alkaline Phosphatase U/L 80 78 84   AST U/L 18 17 20   ALT U/L 16 19 19   Magnesium mg/dL 1.8 1.8 1.9       Vancomycin Administrations:  vancomycin given in the last 96 hours                     vancomycin 1.25 g in dextrose 5% 250 mL IVPB (ready to mix) (mg) 1,250 mg New Bag 09/24/22 1618     1,250 mg New Bag 09/23/22 1719     1,250 mg New Bag 09/22/22 1736     1,250 mg New Bag 09/21/22 1852                    Microbiologic Results:  Microbiology Results (last 7 days)       Procedure Component Value Units Date/Time    Blood culture [[id number]] Collected: 09/18/22 1117    Order Status: Completed Specimen: Blood from Peripheral, Right Hand Updated: 09/23/22 1412     Blood Culture, Routine No growth after 5 days.    Blood culture [[id number]] Collected: 09/18/22 1105    Order Status: Completed Specimen: Blood from Antecubital, Right Arm Updated: 09/23/22 1212     Blood Culture, Routine No growth after 5 days.    Blood culture [[id number]]  (Abnormal) Collected: 09/16/22 0443    Order Status: Completed Specimen: Blood Updated: 09/19/22 0951     Blood Culture, Routine Gram stain aer bottle: Gram positive cocci in clusters resembling Staph      Positive results previously called 09/17/2022  05:34      METHICILLIN RESISTANT STAPHYLOCOCCUS AUREUS  ID consult required at [facility],[place] and [facility] locations.  For susceptibility see order #[id number]      Narrative:      Lft wrist    Blood culture [[id number]]  (Abnormal)  (Susceptibility) Collected: 09/14/22 1302    Order Status: Completed  Specimen: Blood from Wrist, Left Updated: 09/19/22 0734     Blood Culture, Routine Gram stain robert bottle: Gram positive cocci      Positive results previously called 09/15/2022  02:59      METHICILLIN RESISTANT STAPHYLOCOCCUS AUREUS  ID consult required at [facility].       Comment: Previous comment was modified by JNR at 09:12 on 09/17/2022 ID   consult required at [facility] and [facility].         Blood culture [[id number]]  (Abnormal) Collected: 09/16/22 0443    Order Status: Completed Specimen: Blood Updated: 09/18/22 0748     Blood Culture, Routine Gram stain aer bottle: Gram positive cocci in clusters resembling Staph      Results called to and read back by:Estrellita Eric RN 09/16/2022  22:55      METHICILLIN RESISTANT STAPHYLOCOCCUS AUREUS  ID consult required at [facility] and [facility].  For susceptibility see order #[id number]      Narrative:      Rt hand    Blood culture [[id number]]  (Abnormal) Collected: 09/15/22 1142    Order Status: Completed Specimen: Blood Updated: 09/18/22 0748     Blood Culture, Routine Gram stain aer bottle: Gram positive cocci in clusters resembling Staph      Positive results previously called 09/16/2022  05:41      METHICILLIN RESISTANT STAPHYLOCOCCUS AUREUS  ID consult required at [facility] and [facility].  For susceptibility see order #[id number]      Narrative:      Rt AC    Blood culture [[id number]]  (Abnormal) Collected: 09/15/22 1142    Order Status: Completed Specimen: Blood Updated: 09/18/22 0747     Blood Culture, Routine Gram stain aer bottle: Gram positive cocci in clusters resembling Staph      Results called to and read back by:Estrellita Eric RN  09/16/2022  05:03      METHICILLIN RESISTANT STAPHYLOCOCCUS AUREUS  ID consult required at [facility] and [facility].  For susceptibility see order #[id number]      Narrative:      Rt wrist

## 2022-09-25 NOTE — PLAN OF CARE
Problem: Adult Inpatient Plan of Care  Goal: Patient-Specific Goal (Individualized)  Outcome: Ongoing, Progressing  Goal: Optimal Comfort and Wellbeing  Outcome: Ongoing, Progressing  Goal: Readiness for Transition of Care  Outcome: Ongoing, Progressing     Problem: Adjustment to Illness (Sepsis/Septic Shock)  Goal: Optimal Coping  Outcome: Ongoing, Progressing     Problem: Bleeding (Sepsis/Septic Shock)  Goal: Absence of Bleeding  Outcome: Ongoing, Progressing     Problem: Glycemic Control Impaired (Sepsis/Septic Shock)  Goal: Blood Glucose Level Within Desired Range  Outcome: Ongoing, Progressing     Problem: Infection Progression (Sepsis/Septic Shock)  Goal: Absence of Infection Signs and Symptoms  Outcome: Ongoing, Progressing     Problem: Nutrition Impaired (Sepsis/Septic Shock)  Goal: Optimal Nutrition Intake  Outcome: Ongoing, Progressing     Problem: Infection  Goal: Absence of Infection Signs and Symptoms  Outcome: Ongoing, Progressing     Problem: Impaired Wound Healing  Goal: Optimal Wound Healing  Outcome: Ongoing, Progressing     Problem: Fall Injury Risk  Goal: Absence of Fall and Fall-Related Injury  Outcome: Ongoing, Progressing

## 2022-09-25 NOTE — PROGRESS NOTES
Clinical Cardiac Electrophysiology Follow-Up Note     Date:  9/25/2022  Requesting attending:  Sree Perez MD    Chief Complaint/Reason for Consultation:    Patient with MRSA bacteremia. EP consulted for device extraction     Problem List:      1. MRSA bacteremia   2. 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 Teofilo Moore , and upgrade to a [employer] CRT-D in 5/2019 by Dr. Teofilo Moore.   3. H/O ventricular tachycardia on amiodarone 300 mg daily  4. LBBB  5. New DVT located in L brachial vein STARTED on heparin gtt   6. CAD s/p STEMI s/p PCI LAD 2007  7. provoked PE/DVT in 2011  8. Former smoker     HARRY no vegetations    24 Hr Events and Subjective:   Noevengs  TM: V paced rhythm  Scheduled Meds:       acetaminophen  650 mg Oral QID    allopurinoL  200 mg Oral Daily    amiodarone  300 mg Oral Daily    aspirin  81 mg Oral Daily    atorvastatin  80 mg Oral Daily    LIDOcaine  1 patch Transdermal Q24H    polyethylene glycol  17 g Oral Daily    sacubitriL-valsartan  1 tablet Oral BID    sodium chloride 0.9%  10 mL Intravenous Q6H       Continuous Infusions:      DOBUTamine IV infusion (non-titrating) 2.5 mcg/kg/min (09/24/22 1647)    heparin (porcine) in D5W 20 Units/kg/hr (09/25/22 0713)         PRN Meds:   dextromethorphan-guaiFENesin  mg, heparin (PORCINE), heparin (PORCINE), HYDROcodone-acetaminophen, methocarbamoL, ondansetron, oxyCODONE, senna-docusate 8.6-50 mg, sodium chloride 0.9%, Flushing PICC Protocol **AND** sodium chloride 0.9% **AND** sodium chloride 0.9%, Pharmacy to dose Vancomycin consult **AND** vancomycin - pharmacy to dose    Allergies:     Review of patient's allergies indicates:   Allergen Reactions    Iodine containing multivitamin Swelling     itching    Keflex [cephalexin] Swelling     Eyes.  Tolerated multiple doses of zosyn and 1 dose of augmentin in 2015 and 2016, respectively    Peaches [peach (prunus persica)]  Swelling     eyes    Shellfish containing products Swelling    Fig tree Swelling     itching    Tuberculin spenser test ppd Rash       Physical Exam:     Vitals:    09/25/22 1215   BP: (!) 119/58   Pulse: 71   Resp: 17   Temp: 96.9 °F (36.1 °C)       Eyes: Sclerae anicteric. Ears/nose/throat: Mucous membranes moist. Neck: No thyromegaly, lymphadenopathy, or jugular venous distention. Respiratory: Lungs clear to auscultation bilaterally. Cardiovascular: Heart was regular with normal first and second heart sounds. No S3 or S4. GI: Soft, nontender, nondistended, with normal bowel sounds. Musculoskeletal: extremities without cyanosis, clubbing or pitting edema. Neurologic: Patient is alert and oriented x3 and there is no focal strength deficit. Skin: no rashes, cuts, sores. Psychiatric: normal affect. Hematologic: no abnormal bruising or bleeding.    Laboratory Data:      BUN/Cr/glu/ALT/AST/amyl/lip:  24/1.8/--/19/20/--/-- (09/25 0427)  WBC/Hgb/Hct/Plts:  5.25/10.2/32.4/288 (09/25 0427)  PT/INR/PTT:  --/--/35.6 (09/25 0427)  Protein Creatinine Ratios:    Creatinine, Urine   Date Value Ref Range Status   03/30/2022 101.0 23.0 - 375.0 mg/dL Final   11/08/2015 80.0 23.0 - 375.0 mg/dL Final     Comment:     The random urine reference ranges provided were established   for 24 hour urine collections.  No reference ranges exist for  random urine specimens.  Correlate clinically.     01/19/2011 40 23 - 375 mg/dl Final     Protein, Urine Random   Date Value Ref Range Status   11/08/2015 20 (H) 0 - 15 mg/dL Final     Comment:     The random urine reference ranges provided were established   for 24 hour urine collections.  No reference ranges exist for  random urine specimens.  Correlate clinically.       Prot/Creat Ratio, Urine   Date Value Ref Range Status   11/08/2015 0.25 (H) 0.00 - 0.20 Final       Plan:        -Patient with no history of CABG or sternal incision. Need CTS back-up for this case.   -Will plan extraction  tentatively for Tuesday        Ramu Macedo   [facility]  PGY4 Cardiology Fellow

## 2022-09-25 NOTE — SUBJECTIVE & OBJECTIVE
Interval History: No acute issues overnight.    Resumed PO diuretics 9/21. Will hold diuretics today as slight bump in cr and patient looks dry.   Plan for ICD lead extraction Tuesday 9/27/22. Followed by eval of Pancreatic mass.       Continuous Infusions:   DOBUTamine IV infusion (non-titrating) 2.5 mcg/kg/min (09/24/22 1647)    heparin (porcine) in D5W 20 Units/kg/hr (09/25/22 0713)     Scheduled Meds:   acetaminophen  650 mg Oral QID    allopurinoL  200 mg Oral Daily    amiodarone  300 mg Oral Daily    aspirin  81 mg Oral Daily    atorvastatin  80 mg Oral Daily    LIDOcaine  1 patch Transdermal Q24H    polyethylene glycol  17 g Oral Daily    sacubitriL-valsartan  1 tablet Oral BID    sodium chloride 0.9%  10 mL Intravenous Q6H     PRN Meds:dextromethorphan-guaiFENesin  mg, heparin (PORCINE), heparin (PORCINE), HYDROcodone-acetaminophen, methocarbamoL, ondansetron, oxyCODONE, senna-docusate 8.6-50 mg, sodium chloride 0.9%, Flushing PICC Protocol **AND** sodium chloride 0.9% **AND** sodium chloride 0.9%, Pharmacy to dose Vancomycin consult **AND** vancomycin - pharmacy to dose    Review of patient's allergies indicates:   Allergen Reactions    Iodine containing multivitamin Swelling     itching    Keflex [cephalexin] Swelling     Eyes.  Tolerated multiple doses of zosyn and 1 dose of augmentin in 2015 and 2016, respectively    Peaches [peach (prunus persica)] Swelling     eyes    Shellfish containing products Swelling    Fig tree Swelling     itching    Tuberculin spenser test ppd Rash     Objective:     Vital Signs (Most Recent):  Temp: 96.9 °F (36.1 °C) (09/25/22 1215)  Pulse: 71 (09/25/22 1215)  Resp: 17 (09/25/22 1215)  BP: (!) 119/58 (09/25/22 1215)  SpO2: (!) 93 % (09/25/22 1215)   Vital Signs (24h Range):  Temp:  [39.2 °F (4 °C)-98.4 °F (36.9 °C)] 96.9 °F (36.1 °C)  Pulse:  [64-76] 71  Resp:  [16-20] 17  SpO2:  [92 %-96 %] 93 %  BP: (105-141)/(50-75) 119/58     Patient Vitals for the past 72 hrs (Last 3  readings):   Weight   09/25/22 0500 94.4 kg (208 lb 3.6 oz)   09/23/22 0500 95.7 kg (210 lb 15.7 oz)       Body mass index is 32.61 kg/m².      Intake/Output Summary (Last 24 hours) at 9/25/2022 1216  Last data filed at 9/25/2022 0900  Gross per 24 hour   Intake 600 ml   Output 400 ml   Net 200 ml         Hemodynamic Parameters:       Telemetry: no events on tele    Physical Exam  Physical Exam  Constitutional:       Appearance: Normal appearance. He is not ill-appearing.   Cardiovascular:      Rate and Rhythm: Normal rate and regular rhythm.      Pulses: Normal pulses.      Heart sounds: Normal heart sounds.   Elevated JVD.  Pulmonary:      Effort: Pulmonary effort is normal.      Breath sounds: Normal breath sounds.   Abdominal:      General: Abdomen is flat. Bowel sounds are normal. There is distension.      Palpations: Abdomen is soft.      Tenderness: There is abdominal tenderness.   Musculoskeletal:         General: No swelling   1+ pitting edema in b/l LE upto knee. Mild tenderness around left upper arm, no fluctuance.  Skin:     General: Skin is warm and dry.      Findings: Erythema present.   Neurological:      Mental Status: He is alert and oriented to person, place, and time. Mental status is at baseline.   Psychiatric:         Behavior: Behavior normal.         Thought Content: Thought content normal.       Significant Labs:  CBC:  Recent Labs   Lab 09/23/22  0643 09/24/22  0521 09/25/22 0427   WBC 6.07 5.18 5.25   RBC 3.84* 3.88* 3.73*   HGB 10.8* 10.6* 10.2*   HCT 33.1* 32.9* 32.4*    263 288   MCV 86 85 87   MCH 28.1 27.3 27.3   MCHC 32.6 32.2 31.5*       BNP:  No results for input(s): BNP in the last 168 hours.    Invalid input(s): BNPTRIAGELBLO  CMP:  Recent Labs   Lab 09/23/22  0643 09/24/22  0521 09/25/22 0427    115* 89   CALCIUM 8.6* 8.4* 8.4*   ALBUMIN 2.9* 2.9* 3.0*   PROT 6.1 6.2 6.2   * 134* 134*   K 3.7 3.7 4.1   CO2 24 24 27    101 100   BUN 21 22 24*    CREATININE 1.5* 1.5* 1.8*   ALKPHOS 80 78 84   ALT 16 19 19   AST 18 17 20   BILITOT 0.5 0.5 0.3        Coagulation:   Recent Labs   Lab 09/21/22  2309 09/22/22  0552 09/23/22  1605 09/24/22  0521 09/25/22  0427   INR 1.0  --   --   --   --    APTT 28.8   < > 44.3* 46.7* 35.6*    < > = values in this interval not displayed.       LDH:  No results for input(s): LDH in the last 72 hours.  Microbiology:  Microbiology Results (last 7 days)       Procedure Component Value Units Date/Time    Blood culture [[id number]] Collected: 09/18/22 1117    Order Status: Completed Specimen: Blood from Peripheral, Right Hand Updated: 09/23/22 1412     Blood Culture, Routine No growth after 5 days.    Blood culture [[id number]] Collected: 09/18/22 1105    Order Status: Completed Specimen: Blood from Antecubital, Right Arm Updated: 09/23/22 1212     Blood Culture, Routine No growth after 5 days.    Blood culture [[id number]]  (Abnormal) Collected: 09/16/22 0443    Order Status: Completed Specimen: Blood Updated: 09/19/22 0951     Blood Culture, Routine Gram stain aer bottle: Gram positive cocci in clusters resembling Staph      Positive results previously called 09/17/2022  05:34      METHICILLIN RESISTANT STAPHYLOCOCCUS AUREUS  ID consult required at [facility] and [facility].  For susceptibility see order #[id number]      Narrative:      Lft wrist    Blood culture [[id number]]  (Abnormal)  (Susceptibility) Collected: 09/14/22 1302    Order Status: Completed Specimen: Blood from Wrist, Left Updated: 09/19/22 0734     Blood Culture, Routine Gram stain robert bottle: Gram positive cocci      Positive results previously called 09/15/2022  02:59      METHICILLIN RESISTANT STAPHYLOCOCCUS AUREUS  ID consult required at [facility] and [facility].       Comment: Previous comment was modified by KRISTINA at 09:12 on 09/17/2022 ID   consult required at [facility] and [facility].                     Estimated  Creatinine Clearance: 41.9 mL/min (A) (based on SCr of 1.8 mg/dL (H)).    Diagnostic Results:

## 2022-09-25 NOTE — PROGRESS NOTES
[facility] - Cardiology Stepdown  Heart Transplant  Progress Note    Patient Name: Audrey Oneil Jr.  MRN: [id number]  Admission Date: 9/14/2022  Hospital Length of Stay: 11 days  Attending Physician: Sree Perez MD  Primary Care Provider: Primary Doctor No  Principal Problem:Chronic combined systolic and diastolic congestive heart failure    Subjective:     Interval History: No acute issues overnight.    Resumed PO diuretics 9/21. Will hold diuretics today as slight bump in cr and patient looks dry. CO 3.6, CI 1.7, SVR 1688. Will increase dobutamine from 2.5 to 5 .  Plan for ICD lead extraction Tuesday 9/27/22. Followed by eval of Pancreatic mass.       Continuous Infusions:   DOBUTamine IV infusion (non-titrating) 2.5 mcg/kg/min (09/24/22 1647)    heparin (porcine) in D5W 20 Units/kg/hr (09/25/22 0713)     Scheduled Meds:   acetaminophen  650 mg Oral QID    allopurinoL  200 mg Oral Daily    amiodarone  300 mg Oral Daily    aspirin  81 mg Oral Daily    atorvastatin  80 mg Oral Daily    LIDOcaine  1 patch Transdermal Q24H    polyethylene glycol  17 g Oral Daily    sacubitriL-valsartan  1 tablet Oral BID    sodium chloride 0.9%  10 mL Intravenous Q6H     PRN Meds:dextromethorphan-guaiFENesin  mg, heparin (PORCINE), heparin (PORCINE), HYDROcodone-acetaminophen, methocarbamoL, ondansetron, oxyCODONE, senna-docusate 8.6-50 mg, sodium chloride 0.9%, Flushing PICC Protocol **AND** sodium chloride 0.9% **AND** sodium chloride 0.9%, Pharmacy to dose Vancomycin consult **AND** vancomycin - pharmacy to dose    Review of patient's allergies indicates:   Allergen Reactions    Iodine containing multivitamin Swelling     itching    Keflex [cephalexin] Swelling     Eyes.  Tolerated multiple doses of zosyn and 1 dose of augmentin in 2015 and 2016, respectively    Peaches [peach (prunus persica)] Swelling     eyes    Shellfish containing products Swelling    Fig tree Swelling     itching    Tuberculin spenser test  ppd Rash     Objective:     Vital Signs (Most Recent):  Temp: 96.9 °F (36.1 °C) (09/25/22 1215)  Pulse: 71 (09/25/22 1215)  Resp: 17 (09/25/22 1215)  BP: (!) 119/58 (09/25/22 1215)  SpO2: (!) 93 % (09/25/22 1215)   Vital Signs (24h Range):  Temp:  [39.2 °F (4 °C)-98.4 °F (36.9 °C)] 96.9 °F (36.1 °C)  Pulse:  [64-76] 71  Resp:  [16-20] 17  SpO2:  [92 %-96 %] 93 %  BP: (105-141)/(50-75) 119/58     Patient Vitals for the past 72 hrs (Last 3 readings):   Weight   09/25/22 0500 94.4 kg (208 lb 3.6 oz)   09/23/22 0500 95.7 kg (210 lb 15.7 oz)       Body mass index is 32.61 kg/m².      Intake/Output Summary (Last 24 hours) at 9/25/2022 1216  Last data filed at 9/25/2022 0900  Gross per 24 hour   Intake 600 ml   Output 400 ml   Net 200 ml         Hemodynamic Parameters:       Telemetry: no events on tele    Physical Exam  Physical Exam  Constitutional:       Appearance: Normal appearance. He is not ill-appearing.   Cardiovascular:      Rate and Rhythm: Normal rate and regular rhythm.      Pulses: Normal pulses.      Heart sounds: Normal heart sounds.   Elevated JVD.  Pulmonary:      Effort: Pulmonary effort is normal.      Breath sounds: Normal breath sounds.   Abdominal:      General: Abdomen is flat. Bowel sounds are normal. There is distension.      Palpations: Abdomen is soft.      Tenderness: There is abdominal tenderness.   Musculoskeletal:         General: No swelling   1+ pitting edema in b/l LE upto knee. Mild tenderness around left upper arm, no fluctuance.  Skin:     General: Skin is warm and dry.      Findings: Erythema present.   Neurological:      Mental Status: He is alert and oriented to person, place, and time. Mental status is at baseline.   Psychiatric:         Behavior: Behavior normal.         Thought Content: Thought content normal.       Significant Labs:  CBC:  Recent Labs   Lab 09/23/22  0643 09/24/22  0521 09/25/22  0427   WBC 6.07 5.18 5.25   RBC 3.84* 3.88* 3.73*   HGB 10.8* 10.6* 10.2*   HCT  33.1* 32.9* 32.4*    263 288   MCV 86 85 87   MCH 28.1 27.3 27.3   MCHC 32.6 32.2 31.5*       BNP:  No results for input(s): BNP in the last 168 hours.    Invalid input(s): BNPTRIAGELBLO  CMP:  Recent Labs   Lab 09/23/22  0643 09/24/22  0521 09/25/22  0427    115* 89   CALCIUM 8.6* 8.4* 8.4*   ALBUMIN 2.9* 2.9* 3.0*   PROT 6.1 6.2 6.2   * 134* 134*   K 3.7 3.7 4.1   CO2 24 24 27    101 100   BUN 21 22 24*   CREATININE 1.5* 1.5* 1.8*   ALKPHOS 80 78 84   ALT 16 19 19   AST 18 17 20   BILITOT 0.5 0.5 0.3        Coagulation:   Recent Labs   Lab 09/21/22  2309 09/22/22  0552 09/23/22  1605 09/24/22  0521 09/25/22  0427   INR 1.0  --   --   --   --    APTT 28.8   < > 44.3* 46.7* 35.6*    < > = values in this interval not displayed.       LDH:  No results for input(s): LDH in the last 72 hours.  Microbiology:  Microbiology Results (last 7 days)       Procedure Component Value Units Date/Time    Blood culture [[id number]] Collected: 09/18/22 1117    Order Status: Completed Specimen: Blood from Peripheral, Right Hand Updated: 09/23/22 1412     Blood Culture, Routine No growth after 5 days.    Blood culture [[id number]] Collected: 09/18/22 1105    Order Status: Completed Specimen: Blood from Antecubital, Right Arm Updated: 09/23/22 1212     Blood Culture, Routine No growth after 5 days.    Blood culture [[id number]]  (Abnormal) Collected: 09/16/22 0443    Order Status: Completed Specimen: Blood Updated: 09/19/22 0951     Blood Culture, Routine Gram stain aer bottle: Gram positive cocci in clusters resembling Staph      Positive results previously called 09/17/2022  05:34      METHICILLIN RESISTANT STAPHYLOCOCCUS AUREUS  ID consult required at [facility].  For susceptibility see order #[id number]      Narrative:      Lft wrist    Blood culture [[id number]]  (Abnormal)  (Susceptibility) Collected: 09/14/22 1302    Order Status: Completed Specimen: Blood from Wrist, Left  Updated: 09/19/22 0734     Blood Culture, Routine Gram stain robert bottle: Gram positive cocci      Positive results previously called 09/15/2022  02:59      METHICILLIN RESISTANT STAPHYLOCOCCUS AUREUS  ID consult required at [facility] and [facility].       Comment: Previous comment was modified by KRISTINA at 09:12 on 09/17/2022 ID   [facility].                     Estimated Creatinine Clearance: 41.9 mL/min (A) (based on SCr of 1.8 mg/dL (H)).    Diagnostic Results:      Assessment and Plan:     73 yo WM being worked up for LVAD since hospitalization January 2022 for recurrent VT (he thinks had MI) and cardiogenic shock at [facility]. He was  later seen in [facility] where he was treated for cardiogenic shock and sent home on .  He remains on  and is pursuing evaluation for LVAD.     His case was discussed at selection committee and he was deferred pending evaluation of pancreatic mass.  They wish to proceed with MRI but not sure with his ICD if this will be possible.     Meanwhile Presents with MRSA bacteremia at this admission.     HARRY 9/21/22: Not concerning for Vegetations. BCx negative 9/18           * Chronic combined systolic and diastolic congestive heart failure  - will increase Dobutamine 2.5 mcg/kg/min to 5.( CO 3.6, CI 1.7, SVR 1688.). will repeat mixed Venous oxy at 4 pm  - Continue with Entresto 24-26, mg BID  - restarted  po diueretics 9/21. ( lasix 40 OD  and aldactone 25 OD). Will hold diuretics as Cr bump and pt looks dry.   - Daily weights, Strict I/Os  - Monitor electrolytes and Maintain Mg >2 and K >4  -Telemetry monitoring           Pancreatic lesion  - Gastroenterology consult and recommendations appreciated  - Unable to obtain MRI for further evaluation given CRT-D  - Will hold off on obtaining CT with pancreatic protocol at this time in view of active infection requiring vancomycin and risk of contrast-induced nephrotoxicity in this  patient     Bacteremia due to methicillin resistant Staphylococcus aureus  - Monitor WBC count and fever curve, pan-culture if patient spikes  - ID consult and recommendations are appreciated  - Vancomycin 1,250, daily; Monitor level and renal panel  -patient has persistent positive blood cultures.  Cultures from 18 no growth so far.  His currently on vancomycin.  Id on board.  - HARRY 9/21 not concerning for any vegetations  -Lead extraction early next wk followed by eval of Pancreatic mass after cr stabilization for HT work up.  -will eventually need abx for 6 wks following lead removal.     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 Has a functional Rt arm PICC.    Benton Rendon MD  Heart Transplant  [facility] - Cardiology Stepdown

## 2022-09-26 NOTE — PROGRESS NOTES
Pharmacokinetic Assessment Follow Up: IV Vancomycin     Vancomycin random resulted today at 19.2.  Plan to administer vancomycin at reduced dose of 750mg iv x 1 dose today.   Continuing LVAERNE with SCr at 1.7 mg/dL.   Follow up AM random levels.   Redose for random level <20     Thank you for the consult,   Alexandria Barnes, PharmD, [facility]  Heart Transplant Clinical Specialist   Spectralink: [id number]    Drug levels (last 3 results):  Recent Labs   Lab Result Units 09/23/22  1535 09/25/22  1509 09/26/22  0423   Vancomycin, Random ug/mL  --  23.8 19.2   Vancomycin-Trough ug/mL 18.5  --   --         Patient brief summary:  Audrey Oneil Jr. is a 72 y.o. male initiated on antimicrobial therapy with IV Vancomycin for treatment of bacteremia    Drug Allergies:   Review of patient's allergies indicates:   Allergen Reactions    Iodine containing multivitamin Swelling     itching    Keflex [cephalexin] Swelling     Eyes.  Tolerated multiple doses of zosyn and 1 dose of augmentin in 2015 and 2016, respectively    Peaches [peach (prunus persica)] Swelling     eyes    Shellfish containing products Swelling    Fig tree Swelling     itching    Tuberculin spenser test ppd Rash       Actual Body Weight:   94.9 kg    Renal Function:   Estimated Creatinine Clearance: 44.4 mL/min (A) (based on SCr of 1.7 mg/dL (H)).,     Dialysis Method (if applicable):  N/A    CBC (last 72 hours):  Recent Labs   Lab Result Units 09/24/22  0521 09/25/22 0427 09/26/22  0423   WBC K/uL 5.18 5.25 5.67   Hemoglobin g/dL 10.6* 10.2* 10.4*   Hematocrit % 32.9* 32.4* 32.6*   Platelets K/uL 263 288 320   Gran % % 63.3 62.5 61.4   Lymph % % 26.3 26.9 28.2   Mono % % 7.3 7.2 7.2   Eosinophil % % 1.7 1.7 1.8   Basophil % % 0.2 0.4 0.5   Differential Method  Automated Automated Automated       Metabolic Panel (last 72 hours):  Recent Labs   Lab Result Units 09/24/22  0521 09/25/22  0427 09/26/22  0423   Sodium mmol/L 134* 134* 137   Potassium mmol/L 3.7 4.1 4.0   Chloride  mmol/L 101 100 103   CO2 mmol/L 24 27 23   Glucose mg/dL 115* 89 84   BUN mg/dL 22 24* 22   Creatinine mg/dL 1.5* 1.8* 1.7*   Albumin g/dL 2.9* 3.0* 3.1*   Total Bilirubin mg/dL 0.5 0.3 0.4   Alkaline Phosphatase U/L 78 84 80   AST U/L 17 20 20   ALT U/L 19 19 22   Magnesium mg/dL 1.8 1.9  --        Vancomycin Administrations:  vancomycin given in the last 96 hours                     vancomycin 1.25 g in dextrose 5% 250 mL IVPB (ready to mix) (mg) 1,250 mg New Bag 09/24/22 1618     1,250 mg New Bag 09/23/22 1719     1,250 mg New Bag 09/22/22 1736                    Microbiologic Results:  Microbiology Results (last 7 days)       Procedure Component Value Units Date/Time    Blood culture [[id number]] Collected: 09/18/22 1117    Order Status: Completed Specimen: Blood from Peripheral, Right Hand Updated: 09/23/22 1412     Blood Culture, Routine No growth after 5 days.    Blood culture [[id number]] Collected: 09/18/22 1105    Order Status: Completed Specimen: Blood from Antecubital, Right Arm Updated: 09/23/22 1212     Blood Culture, Routine No growth after 5 days.

## 2022-09-26 NOTE — PROGRESS NOTES
[facility] - Cardiology Stepdown  Heart Transplant  Progress Note    Patient Name: Audrey Oneil Jr.  MRN: [id number]  Admission Date: 9/14/2022  Hospital Length of Stay: 12 days  Attending Physician: Cherelle Hidalgo DO  Primary Care Provider: Primary Doctor No  Principal Problem:Chronic combined systolic and diastolic congestive heart failure    Subjective:     Interval History: No acute issues overnight.    Resumed PO diuretics 9/21. Held diuretics 9/25, 9/26 as slight bump in cr and pt looks dry, CVP of 5. Plan for ICD lead extraction Tuesday 9/27/22. There after Followed by eval of Pancreatic mass for LVAD.       Continuous Infusions:   DOBUTamine IV infusion (non-titrating) 2.5 mcg/kg/min (09/26/22 0413)    heparin (porcine) in D5W 18 Units/kg/hr (09/26/22 1110)     Scheduled Meds:   acetaminophen  650 mg Oral QID    allopurinoL  200 mg Oral Daily    amiodarone  300 mg Oral Daily    aspirin  81 mg Oral Daily    atorvastatin  80 mg Oral Daily    heparin (PORCINE)  80 Units/kg (Adjusted) Intravenous Once    LIDOcaine  1 patch Transdermal Q24H    polyethylene glycol  17 g Oral Daily    sacubitriL-valsartan  1 tablet Oral BID    sodium chloride 0.9%  10 mL Intravenous Q6H    vancomycin (VANCOCIN) IVPB  750 mg Intravenous Once     PRN Meds:dextromethorphan-guaiFENesin  mg, heparin (PORCINE), heparin (PORCINE), HYDROcodone-acetaminophen, methocarbamoL, ondansetron, oxyCODONE, senna-docusate 8.6-50 mg, sodium chloride 0.9%, Flushing PICC Protocol **AND** sodium chloride 0.9% **AND** sodium chloride 0.9%, Pharmacy to dose Vancomycin consult **AND** vancomycin - pharmacy to dose    Review of patient's allergies indicates:   Allergen Reactions    Iodine containing multivitamin Swelling     itching    Keflex [cephalexin] Swelling     Eyes.  Tolerated multiple doses of zosyn and 1 dose of augmentin in 2015 and 2016, respectively    Peaches [peach (prunus persica)] Swelling     eyes    Shellfish  containing products Swelling    Fig tree Swelling     itching    Tuberculin spenser test ppd Rash     Objective:     Vital Signs (Most Recent):  Temp: 97.3 °F (36.3 °C) (09/26/22 0759)  Pulse: 75 (09/26/22 0759)  Resp: 18 (09/26/22 0759)  BP: (!) 111/54 (09/26/22 0759)  SpO2: (!) 93 % (09/26/22 0759)   Vital Signs (24h Range):  Temp:  [96.9 °F (36.1 °C)-98.4 °F (36.9 °C)] 97.3 °F (36.3 °C)  Pulse:  [] 75  Resp:  [16-18] 18  SpO2:  [93 %-97 %] 93 %  BP: ()/(53-59) 111/54     Patient Vitals for the past 72 hrs (Last 3 readings):   Weight   09/26/22 0413 94.9 kg (209 lb 3.5 oz)   09/25/22 0500 94.4 kg (208 lb 3.6 oz)       Body mass index is 32.77 kg/m².      Intake/Output Summary (Last 24 hours) at 9/26/2022 1133  Last data filed at 9/26/2022 0400  Gross per 24 hour   Intake 780 ml   Output 950 ml   Net -170 ml         Hemodynamic Parameters:       Telemetry: no events on tele    Physical Exam  Physical Exam  Constitutional:       Appearance: Normal appearance. He is not ill-appearing.   Cardiovascular:      Rate and Rhythm: Normal rate and regular rhythm.      Pulses: Normal pulses.      Heart sounds: Normal heart sounds.   Elevated JVD.  Pulmonary:      Effort: Pulmonary effort is normal.      Breath sounds: Normal breath sounds.   Abdominal:      General: Abdomen is flat. Bowel sounds are normal. There is distension.      Palpations: Abdomen is soft.      Tenderness: There is abdominal tenderness.   Musculoskeletal:         General: No swelling   1+ pitting edema in b/l LE upto knee. Mild tenderness around left upper arm, no fluctuance.  Skin:     General: Skin is warm and dry.      Findings: Erythema present.   Neurological:      Mental Status: He is alert and oriented to person, place, and time. Mental status is at baseline.   Psychiatric:         Behavior: Behavior normal.         Thought Content: Thought content normal.       Significant Labs:  CBC:  Recent Labs   Lab 09/24/22  0521 09/25/22  0427  09/26/22  0423   WBC 5.18 5.25 5.67   RBC 3.88* 3.73* 3.80*   HGB 10.6* 10.2* 10.4*   HCT 32.9* 32.4* 32.6*    288 320   MCV 85 87 86   MCH 27.3 27.3 27.4   MCHC 32.2 31.5* 31.9*       BNP:  No results for input(s): BNP in the last 168 hours.    Invalid input(s): BNPTRIAGELBLO  CMP:  Recent Labs   Lab 09/24/22  0521 09/25/22  0427 09/26/22  0423   * 89 84   CALCIUM 8.4* 8.4* 8.8   ALBUMIN 2.9* 3.0* 3.1*   PROT 6.2 6.2 6.2   * 134* 137   K 3.7 4.1 4.0   CO2 24 27 23    100 103   BUN 22 24* 22   CREATININE 1.5* 1.8* 1.7*   ALKPHOS 78 84 80   ALT 19 19 22   AST 17 20 20   BILITOT 0.5 0.3 0.4        Coagulation:   Recent Labs   Lab 09/21/22  2309 09/22/22  0552 09/25/22  0427 09/25/22  1509 09/26/22  0128   INR 1.0  --   --   --   --    APTT 28.8   < > 35.6* 65.8* 49.8*    < > = values in this interval not displayed.       LDH:  No results for input(s): LDH in the last 72 hours.  Microbiology:  Microbiology Results (last 7 days)       Procedure Component Value Units Date/Time    Blood culture [[id number]] Collected: 09/18/22 1117    Order Status: Completed Specimen: Blood from Peripheral, Right Hand Updated: 09/23/22 1412     Blood Culture, Routine No growth after 5 days.    Blood culture [[id number]] Collected: 09/18/22 1105    Order Status: Completed Specimen: Blood from Antecubital, Right Arm Updated: 09/23/22 1212     Blood Culture, Routine No growth after 5 days.                Estimated Creatinine Clearance: 44.4 mL/min (A) (based on SCr of 1.7 mg/dL (H)).    Diagnostic Results:      Assessment and Plan:     71 yo WM being worked up for LVAD since hospitalization January 2022 for recurrent VT (he thinks had MI) and cardiogenic shock at [facility]. He was  later seen in [facility] where he was treated for cardiogenic shock and sent home on .  He remains on  and is pursuing evaluation for LVAD.     His case was discussed at selection committee and he was deferred pending evaluation of pancreatic  mass.  They wish to proceed with MRI but not sure with his ICD if this will be possible.     Meanwhile Presents with MRSA bacteremia at this admission.     HARRY 9/21/22: Not concerning for Vegetations. BCx negative 9/18           * Chronic combined systolic and diastolic congestive heart failure  - On Dobutamine 2.5 . CO 4.5, CI 2.1, SVR 1048, CVP 5.  - Continue with Entresto 24-26, mg BID. Will hold tonight in view of Lead extraction procedure tomorrow.  - restarted  po diueretics 9/21. ( lasix 40 OD  and aldactone 25 OD). But on  Hold currently  9/25, 9/26 due to elev cr, CVP 5, pt looks dry on exam.  - Daily weights, Strict I/Os  - Monitor electrolytes and Maintain Mg >2 and K >4  -Telemetry monitoring           Pancreatic lesion  - Gastroenterology consult and recommendations appreciated  - Unable to obtain MRI for further evaluation given CRT-D  - Will hold off on obtaining CT with pancreatic protocol at this time in view of active infection requiring vancomycin and risk of contrast-induced nephrotoxicity in this patient     Bacteremia due to methicillin resistant Staphylococcus aureus  - Monitor WBC count and fever curve, pan-culture if patient spikes  - ID consult and recommendations are appreciated  - Vancomycin 1,250, daily; Monitor level and renal panel  -patient has persistent positive blood cultures.  Cultures from 18 no growth so far.  His currently on vancomycin.  Id on board.  - HARRY 9/21 not concerning for any vegetations  -Lead extraction early next wk followed by eval of Pancreatic mass after cr stabilization for HT work up.  -will eventually need abx for 6 wks following lead removal.     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 Has a functional Rt arm  PICC.      Benton Rendon MD  Heart Transplant  [facility] - Cardiology Stepdown

## 2022-09-26 NOTE — ANESTHESIA PREPROCEDURE EVALUATION
[facility]  Anesthesia Pre-Operative Evaluation         Patient Name: Audrey Oneil Jr.  YOB: 1950  MRN: [id number]    SUBJECTIVE:     Pre-operative evaluation for Procedure(s) (LRB):  EXTRACTION, ELECTRODE LEAD (N/A)     09/26/2022    Audrey Oneil Jr. is a 72 y.o. male with a significant medical history of HFrEF (EF 15%, S/p CRT-D, on chronic  2.5), CAD (s/p multiple remote PCI, reported non-obstructive [facility] 1/2022), Vtach (on amio), HTN, HLD, and CKD3a who presents for the above procedure.     He initially presented with 1 day upper abdominal pain and SOB found to have decompensated HF with progressive hypoxic respiratory failure found to have MRSA septicemia. Currently on vancomycin. Found to have new DVT located in L brachial vein STARTED on heparin gtt.      Results for orders placed during the hospital encounter of 09/14/22    Echo    Interpretation Summary  · Moderate left atrial enlargement.  · The left ventricle is severely enlarged with eccentric hypertrophy and severely decreased systolic function.  · There is severe left ventricular global hypokinesis with anterospetal and apical scar. The estimated ejection fraction is 10-15%.  · Left ventricular diastolic dysfunction.  · Normal right ventricular size with normal right ventricular systolic function.  · Mild mitral regurgitation.  · Normal central venous pressure (3 mmHg).  · There is no significant tricuspid regurgitation and therefore the pulmonary artery systolic pressure cannot be reported.      LDA:   PICC Double Lumen 09/22/22 1507 right basilic (Active)   Verification by X-ray Yes 09/23/22 0825   Site Assessment No drainage;No redness;No swelling;No warmth 09/25/22 2000   Extremity Assessment Distal to IV No abnormal discoloration;No redness;No swelling;No warmth 09/25/22 2000   Line Securement Device Secured with sutureless device 09/25/22 2000   Dressing Type Biopatch in place;Transparent (Tegaderm) 09/25/22  2000   Dressing Status Clean;Dry;Intact 09/25/22 2000   Dressing Intervention Integrity maintained 09/25/22 2000   Date on Dressing 09/22/22 09/25/22 2000   Dressing Due to be Changed 09/29/22 09/25/22 2000   Left Lumen Patency/Care Infusing 09/25/22 2000   Right Lumen Patency/Care Infusing 09/25/22 2000   Current Insertion Depth (cm) 36 cm 09/22/22 1507   Current Exposed Catheter (cm) 0 cm 09/22/22 1507   Extremity Circumference (cm) 30 cm 09/22/22 1507   Line Necessity Review Longterm central access required 09/23/22 0825   Number of days: 3       Prev airway: 1/30/2022: Video laryngoscopy, Stylet; Oral Standard; 7.5 mm; Cuffed    Drips:    DOBUTamine IV infusion (non-titrating) 2.5 mcg/kg/min (09/26/22 0413)    heparin (porcine) in D5W 18 Units/kg/hr (09/26/22 1110)       Patient Active Problem List   Diagnosis    Coronary artery disease involving native coronary artery of native heart without angina pectoris    Chronic combined systolic and diastolic heart failure    Obesity (BMI 30.0-34.9)    Cardiomyopathy, ischemic    Hx pulmonary embolism 2010 provoked dvt     Postural dizziness with presyncope    History of DVT (deep vein thrombosis)    Ventricular tachycardia    Hypertensive cardiovascular-renal disease, stage 1-4 or unspecified chronic kidney disease, with heart failure    Presence of cardiac resynchronization therapy defibrillator (CRT-D)    Mixed hyperlipidemia    Long term current use of amiodarone    Thoracic aortic atherosclerosis    Stage 3a chronic kidney disease    Prediabetes    LBBB (left bundle branch block)    Elevated troponin I level    Gout    Chronic combined systolic and diastolic congestive heart failure    H/O ventricular tachycardia    Vitamin D deficiency    Blue skin    MRSA infection    Rotator cuff syndrome    Severe sepsis    Pressure injury of heel, stage 2    Dermatitis associated with moisture    Skin breakdown    Leukocytosis    Bacteremia due  to methicillin resistant Staphylococcus aureus    Septicemia due to methicillin resistant Staphylococcus aureus    Pancreatic lesion    Cellulitis of left forearm    Acute thrombosis of left brachial vein       Review of patient's allergies indicates:   Allergen Reactions    Iodine containing multivitamin Swelling     itching    Keflex [cephalexin] Swelling     Eyes.  Tolerated multiple doses of zosyn and 1 dose of augmentin in 2015 and 2016, respectively    Peaches [peach (prunus persica)] Swelling     eyes    Shellfish containing products Swelling    Fig tree Swelling     itching    Tuberculin spenser test ppd Rash       Current Inpatient Medications:   acetaminophen  650 mg Oral QID    allopurinoL  200 mg Oral Daily    amiodarone  300 mg Oral Daily    aspirin  81 mg Oral Daily    atorvastatin  80 mg Oral Daily    heparin (PORCINE)  80 Units/kg (Adjusted) Intravenous Once    LIDOcaine  1 patch Transdermal Q24H    polyethylene glycol  17 g Oral Daily    sacubitriL-valsartan  1 tablet Oral BID    sodium chloride 0.9%  10 mL Intravenous Q6H       No current facility-administered medications on file prior to encounter.     Current Outpatient Medications on File Prior to Encounter   Medication Sig Dispense Refill    allopurinoL (ZYLOPRIM) 100 MG tablet Take 2 tablets (200 mg total) by mouth once daily. 60 tablet 0    amiodarone (PACERONE) 200 MG Tab TAKE 1 AND 1/2 TABLETS(300 MG) BY MOUTH EVERY  tablet 3    aspirin (ECOTRIN) 81 MG EC tablet Take 1 tablet (81 mg total) by mouth once daily. 90 tablet 3    atorvastatin (LIPITOR) 80 MG tablet Take 1 tablet (80 mg total) by mouth once daily. 90 tablet 1    DOBUTamine (DOBUTREX) 500 mg/250 mL (2,000 mcg/mL) 2.5 mcg/kg/min  Patient is 95.7 kg 250 mL 5    furosemide (LASIX) 40 MG tablet Take 1 tablet (40 mg total) by mouth daily as needed (Weight gain of 3 lbs in one day or 5 lbs in one week). 30 tablet 11    HYDROcodone-acetaminophen (NORCO)   mg per tablet Take 1 tablet by mouth every 6 (six) hours.      JARDIANCE 25 mg tablet Take 1 tablet (25 mg total) by mouth once daily. 30 tablet 5    ondansetron (ZOFRAN-ODT) 4 MG TbDL Dissolve 1 tablet (4 mg total) by mouth every 6 (six) hours as needed (nausea). 30 tablet 1    polyethylene glycol (GOLYTELY) 236-22.74-6.74 -5.86 gram suspension SMARTSIG:Milliliter(s) By Mouth      sacubitriL-valsartan (ENTRESTO) 49-51 mg per tablet Take 1 tablet by mouth 2 (two) times daily. 60 tablet 3    simethicone (MYLICON) 80 MG chewable tablet Take 1 tablet (80 mg total) by mouth every 6 (six) hours as needed for Flatulence. 60 tablet 1    spironolactone (ALDACTONE) 25 MG tablet Take 1 tablet (25 mg total) by mouth once daily. 30 tablet 3    [DISCONTINUED] clopidogreL (PLAVIX) 75 mg tablet Take 1 tablet (75 mg total) by mouth once daily. (Patient not taking: No sig reported) 90 tablet 3    [DISCONTINUED] colchicine (COLCRYS) 0.6 mg tablet Take 1 tablet (0.6 mg total) by mouth once daily. (Patient not taking: Reported on 3/18/2022) 90 tablet 1    [DISCONTINUED] ipratropium (ATROVENT) 0.02 % nebulizer solution Take 2.5 mLs (500 mcg total) by nebulization 4 (four) times daily as needed for Wheezing. Rescue (Patient not taking: Reported on 3/29/2022) 75 mL 0    [DISCONTINUED] metoprolol succinate (TOPROL-XL) 25 MG 24 hr tablet Take 1 tablet (25 mg total) by mouth once daily. 90 tablet 3    [DISCONTINUED] midodrine (PROAMATINE) 2.5 MG Tab Take 1 tablet (2.5 mg total) by mouth 3 (three) times daily. HOLD until follow up with cardiology 90 tablet 0       Past Surgical History:   Procedure Laterality Date    APPENDECTOMY      BACK SURGERY      CARDIAC DEFIBRILLATOR PLACEMENT      CARDIAC SURGERY  2007    stent    CARPAL TUNNEL RELEASE Right 04/2018    COLONOSCOPY N/A 8/8/2022    Procedure: COLONOSCOPY;  Surgeon: Sascha Keenan MD;  Location: [facility] ([address] [place] [address]);  Service: Endoscopy;  Laterality: N/A;   EF-15%  pre heart    AICD-St Rodri       COVID test [facility] -inst mail-am prep-clears 4 hrs prior-tb    INSERTION OF BIVENTRICULAR IMPLANTABLE CARDIOVERTER-DEFIBRILLATOR (ICD) N/A 5/3/2019    Procedure: INSERTION, ICD, BIVENTRICULAR;  Surgeon: Teofilo Pal MD;  Location: [facility] EP LAB;  Service: Cardiology;  Laterality: N/A;  ICH CM,  ICD UPGD BI-V,  SJM, MAC, FAS, 3PREP (dual ICD INSITU)    r knee scope      REVISION OF SKIN POCKET FOR CARDIOVERTER-DEFIBRILLATOR Left 5/3/2019    Procedure: Revision, Skin Pocket, For Cardioverter-Defibrillator;  Surgeon: Teofilo Pal MD;  Location: [facility] EP LAB;  Service: Cardiology;  Laterality: Left;    RIGHT HEART CATHETERIZATION Right 2021    Procedure: INSERTION, CATHETER, RIGHT HEART;  Surgeon: Lizz Nieto MD;  Location: [facility] CATH LAB;  Service: Cardiology;  Laterality: Right;    RIGHT HEART CATHETERIZATION Right 2022    Procedure: INSERTION, CATHETER, RIGHT HEART;  Surgeon: Migue Henry Jr., MD;  Location: [facility] CATH LAB;  Service: Cardiology;  Laterality: Right;    SPINE SURGERY      TONSILLECTOMY      TRIGGER FINGER RELEASE Right 2018    thumb       Social History     Socioeconomic History    Marital status:     Number of children: 2   Occupational History    Occupation: [employer]     Comment: unemployed   Tobacco Use    Smoking status: Former     Packs/day: 1.00     Years: 45.00     Pack years: 45.00     Types: Cigarettes     Quit date: 2005     Years since quittin.7    Smokeless tobacco: Never    Tobacco comments:     1-1.5 ppd every day.   Substance and Sexual Activity    Alcohol use: No    Drug use: No    Sexual activity: Not Currently       OBJECTIVE:     Vital Signs Range:  Vitals - 1 value per visit 2022   SYSTOLIC 95 111 100   DIASTOLIC 54 54 52   Pulse 72 75 72   Temp 98.4 97.3 97.1   Resp 16 18 18   SPO2 94 93 91   Weight (lb) - - -   Weight (kg) - - -   Height - - -    BMI (Calculated) - - -   VISIT REPORT - - -   Pain Score  - - -   Some recent data might be hidden         CBC:   Lab Results   Component Value Date    WBC 5.67 09/26/2022    HGB 10.4 (L) 09/26/2022    HCT 32.6 (L) 09/26/2022    MCV 86 09/26/2022     09/26/2022         CMP:   Sodium   Date Value Ref Range Status   09/26/2022 137 136 - 145 mmol/L Final     Potassium   Date Value Ref Range Status   09/26/2022 4.0 3.5 - 5.1 mmol/L Final     Chloride   Date Value Ref Range Status   09/26/2022 103 95 - 110 mmol/L Final     CO2   Date Value Ref Range Status   09/26/2022 23 23 - 29 mmol/L Final     Glucose   Date Value Ref Range Status   09/26/2022 84 70 - 110 mg/dL Final     BUN   Date Value Ref Range Status   09/26/2022 22 8 - 23 mg/dL Final     Creatinine   Date Value Ref Range Status   09/26/2022 1.7 (H) 0.5 - 1.4 mg/dL Final     Calcium   Date Value Ref Range Status   09/26/2022 8.8 8.7 - 10.5 mg/dL Final     Total Protein   Date Value Ref Range Status   09/26/2022 6.2 6.0 - 8.4 g/dL Final     Albumin   Date Value Ref Range Status   09/26/2022 3.1 (L) 3.5 - 5.2 g/dL Final     Total Bilirubin   Date Value Ref Range Status   09/26/2022 0.4 0.1 - 1.0 mg/dL Final     Comment:     For infants and newborns, interpretation of results should be based  on gestational age, weight and in agreement with clinical  observations.    Premature Infant recommended reference ranges:  Up to 24 hours.............<8.0 mg/dL  Up to 48 hours............<12.0 mg/dL  3-5 days..................<15.0 mg/dL  6-29 days.................<15.0 mg/dL       Alkaline Phosphatase   Date Value Ref Range Status   09/26/2022 80 55 - 135 U/L Final     AST   Date Value Ref Range Status   09/26/2022 20 10 - 40 U/L Final     ALT   Date Value Ref Range Status   09/26/2022 22 10 - 44 U/L Final     Anion Gap   Date Value Ref Range Status   09/26/2022 11 8 - 16 mmol/L Final     eGFR if    Date Value Ref Range Status   07/22/2022 38.0 (A)  >60 mL/min/1.73 m^2 Final     eGFR if non    Date Value Ref Range Status   07/22/2022 32.8 (A) >60 mL/min/1.73 m^2 Final     Comment:     Calculation used to obtain the estimated glomerular filtration  rate (eGFR) is the CKD-EPI equation.          INR:  Lab Results   Component Value Date    INR 1.0 09/21/2022    INR 1.0 06/17/2022    INR 1.0 05/29/2022       EKG:   Results for orders placed or performed during the hospital encounter of 09/14/22   EKG 12-lead    Collection Time: 09/22/22  4:49 PM    Narrative    Test Reason : I49.5,    Vent. Rate : 076 BPM     Atrial Rate : 076 BPM     P-R Int : 156 ms          QRS Dur : 182 ms      QT Int : 486 ms       P-R-T Axes : 021 205 035 degrees     QTc Int : 546 ms    Atrial-sensed ventricular-paced rhythm  Abnormal ECG  When compared with ECG of 14-SEP-2022 12:11,  Electronic ventricular pacemaker has replaced Atrial fibrillation  Vent. rate has decreased BY  42 BPM  Confirmed by Cathie Whyte MD ([id number]) on 9/23/2022 4:43:05 PM    Referred By: [email or web address]   SELF           Confirmed By:Cathie Whyte MD        2D ECHO:   Results for orders placed during the hospital encounter of 09/14/22    Echo    Interpretation Summary  · Moderate left atrial enlargement.  · The left ventricle is severely enlarged with eccentric hypertrophy and severely decreased systolic function.  · There is severe left ventricular global hypokinesis with anterospetal and apical scar. The estimated ejection fraction is 10-15%.  · Left ventricular diastolic dysfunction.  · Normal right ventricular size with normal right ventricular systolic function.  · Mild mitral regurgitation.  · Normal central venous pressure (3 mmHg).  · There is no significant tricuspid regurgitation and therefore the pulmonary artery systolic pressure cannot be reported.         ASSESSMENT/PLAN:       Pre-op Assessment    I have reviewed the Patient Summary Reports.     I have reviewed the Nursing Notes. I have  reviewed the NPO Status.   I have reviewed the Medications.     Review of Systems  Anesthesia Hx:  History of prior surgery of interest to airway management or planning:  Denies Personal Hx of Anesthesia complications.   Social:  Former Smoker    Cardiovascular:   Exercise tolerance: poor Pacemaker Hypertension Past MI CAD  CABG/stent Dysrhythmias atrial fibrillation CHF hyperlipidemia    Pulmonary:  Pulmonary Normal    Renal/:   Chronic Renal Disease, CRI    Hepatic/GI:  Hepatic/GI Normal    Neurological:  Neurology Normal    Endocrine:  Endocrine Normal  Obesity / BMI > 30      Physical Exam  General: Well nourished, Cooperative, Alert and Oriented    Airway:  Mallampati: II   Mouth Opening: Normal  TM Distance: Normal  Tongue: Normal  Neck ROM: Normal ROM    Dental:  Intact    Chest/Lungs:  Rales, Basilar    Heart:Trace B/l pedal edema      Anesthesia Plan  Type of Anesthesia, risks & benefits discussed:    Anesthesia Type: Gen ETT  Intra-op Monitoring Plan: Art Line  Post Op Pain Control Plan: multimodal analgesia and IV/PO Opioids PRN  Induction:  IV  Airway Plan: Direct and Video, Post-Induction  Informed Consent: Informed consent signed with the Patient and all parties understand the risks and agree with anesthesia plan.  All questions answered. Patient consented to blood products? Yes  ASA Score: 4  Day of Surgery Review of History & Physical: H&P Update referred to the surgeon/provider.  Anesthesia Plan Notes: Chart reviewed. Patient seen and examined. Anesthesia plan discussed and questions answered. E-consent signed. Apollo Francois MD    Ready For Surgery From Anesthesia Perspective.     .

## 2022-09-26 NOTE — PLAN OF CARE
Problem: Physical Therapy  Goal: Physical Therapy Goal  Description: Goals to be met by: 10/5/22, revised: 10/10/22    Patient will increase functional independence with mobility by performin. Sit to stand transfer with independence and maintaining sternal precautions- not met  2. Gait  x 300 feet with independence using LRAD as needed- not met  3. Ascend/descend 5 stair with bilateral handrails modified independence and maintaining sternal precautions using LRAD as needed- not met  4. Lower extremity exercise program x15 reps per handout, with independence- not met      2022 1538 by Kade Chen, PT  Outcome: Ongoing, Progressing   Pt tolerated PT session well.  POC reduced to 1x a week.      All needs met, all questions answered.

## 2022-09-26 NOTE — PT/OT/SLP PROGRESS
"Physical Therapy  Treatment    Patient Name:  Audrey Oneil Jr.   MRN:  [id number]    Recommendations:     Discharge Recommendations:  home   Discharge Equipment Recommendations: none   Barriers to discharge: decreased functional mobility, fall risk, and decreased caregiver support    Assessment:     Audrey Oneil Jr. is a 72 y.o. male admitted with a medical diagnosis of Chronic combined systolic and diastolic congestive heart failure.  Pt demonstrates the below listed impairments with decreased tolerance to functional mobility and impaired endurance being the most limiting.  Pt demonstrates improved tolerance to out of bed mobility, plan for operation tomorrow 9/27.  Pt requires skilled PT due to patient's status as: a fall risk to allow safe d/c home.     Impairments and functional limitations:  impaired endurance, impaired cardiopulmonary response to activity.  These deficits affect their roles and responsibilities in which they were able to complete prior to admit.  Rehab Prognosis:   Good ; patient would benefit from acute skilled PT services 1 x/week to address these deficits, improve quality of life, focus on recovery of impairments, provide patient/caregiver education, reduce fall risk, and reach maximum level of function.  Pt is highly  motivated to participated in skilled PT.    Recent Surgery:   Procedure(s) (LRB):  ECHOCARDIOGRAM,TRANSESOPHAGEAL (N/A) 5 Days Post-Op    Plan:     During this hospitalization, patient to be seen 1 x/week to address the identified rehab impairments via gait training, therapeutic activities, therapeutic exercises, neuromuscular re-education and progress toward the following goals:    Plan of Care Expires:  10/21/22    Subjective     Chief Complaint: "I would like to sleep"  Patient/Family Comments/Goals: Return home  Pain/Comfort:  Pain Rating 1: 0/10    Objective:     Communicated with RN prior to session.  Patient found left sidelying  with PICC line, telemetry upon PT " entry to room.     General Precautions: Standard, fall   Orthopedic Precautions:N/A   Braces: N/A  Oxygen Device:      Functional Mobility:  Bed Mobility:  Supine to Sit: IND  Head of bed position: HOB flat with railings up    Transfers:  Sit to Stand: IND with No AD  X2 sit to stands    Gait: Patient ambulated 306' and 280' with No AD and Sup. Patient demonstrates occasional unsteady gait. All lines remained intact throughout ambulation trial, mask donned for out of room ambulation.    Balance:   Position Score Time   Static Sitting GOOD+: Takes MAXIMAL challenges n/a   Dynamic Sitting GOOD+: Maintains balance through MAXIMAL excursions of active trunk motion n/a   Static Standing GOOD: Takes MODERATE challenges n/a   Dynamic Standing GOOD-: Maintains balance through MODERATE excursions of active trunk motion, but inconstantly  n/a     AM-PAC 6 CLICK MOBILITY  Turning over in bed (including adjusting bedclothes, sheets and blankets)?: 4  Sitting down on and standing up from a chair with arms (e.g., wheelchair, bedside commode, etc.): 4  Moving from lying on back to sitting on the side of the bed?: 4  Moving to and from a bed to a chair (including a wheelchair)?: 4  Need to walk in hospital room?: 3  Climbing 3-5 steps with a railing?: 3  Basic Mobility Total Score: 22     Therapeutic Activities:  Patient educated on role of acute care PT and PT POC, safety while in hospital including calling nurse for mobility, call light usage, benefits of out of bed mobility, breathing technique, fall risk, bed mobility , transfers, gait technique, positioning, posture, risks of prolonged bed rest, possible discharge disposition , and benefits of continued PT by explanation.    Patient demonstrates good understanding of education provided this day.   Whiteboard updated    Therapeutic Exercises:  n/a    Patient left sitting EOB with all lines intact, call button in reach, and RN notified.    GOALS:   Multidisciplinary Problems        Physical Therapy Goals          Problem: Physical Therapy    Goal Priority Disciplines Outcome Goal Variances Interventions   Physical Therapy Goal     PT, PT/OT Ongoing, Progressing     Description: Goals to be met by: 10/5/22, revised: 10/10/22    Patient will increase functional independence with mobility by performin. Sit to stand transfer with independence and maintaining sternal precautions- not met  2. Gait  x 300 feet with independence using LRAD as needed- not met  3. Ascend/descend 5 stair with bilateral handrails modified independence and maintaining sternal precautions using LRAD as needed- not met  4. Lower extremity exercise program x15 reps per handout, with independence- not met                         Time Tracking:     PT Received On: 22  PT Start Time: 1510     PT Stop Time: 1530  PT Total Time (min): 20 min     Billable Minutes: Gait Training 10    Treatment Type: Treatment  PT/PTA: PT     PTA Visit Number: 0     2022

## 2022-09-26 NOTE — PROGRESS NOTES
Clinical Cardiac Electrophysiology Follow-Up Note     Date:  9/26/2022  Requesting attending:  Cherelle Hidalgo DO    Chief Complaint/Reason for Consultation:    Patient with MRSA bacteremia. EP consulted for device extraction     Problem List:   1. MRSA bacteremia   2. 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 Teofilo Moore , and upgrade to a [employer] CRT-D in 5/2019 by Dr. Teofilo Moore.   3. H/O ventricular tachycardia on amiodarone 300 mg daily  4. LBBB  5. New DVT located in L brachial vein STARTED on heparin gtt   6. CAD s/p STEMI s/p PCI LAD 2007  7. provoked PE/DVT in 2011  8. Former smoker     HARRY no vegetations    24 Hr Events and Subjective:   No events    TM: V paced rhythm  Scheduled Meds:       acetaminophen  650 mg Oral QID    allopurinoL  200 mg Oral Daily    amiodarone  300 mg Oral Daily    aspirin  81 mg Oral Daily    atorvastatin  80 mg Oral Daily    heparin (PORCINE)  80 Units/kg (Adjusted) Intravenous Once    LIDOcaine  1 patch Transdermal Q24H    polyethylene glycol  17 g Oral Daily    sacubitriL-valsartan  1 tablet Oral BID    sodium chloride 0.9%  10 mL Intravenous Q6H       Continuous Infusions:      DOBUTamine IV infusion (non-titrating) 2.5 mcg/kg/min (09/26/22 0413)    heparin (porcine) in D5W 18 Units/kg/hr (09/26/22 1110)         PRN Meds:   dextromethorphan-guaiFENesin  mg, heparin (PORCINE), heparin (PORCINE), HYDROcodone-acetaminophen, methocarbamoL, ondansetron, oxyCODONE, senna-docusate 8.6-50 mg, sodium chloride 0.9%, Flushing PICC Protocol **AND** sodium chloride 0.9% **AND** sodium chloride 0.9%, Pharmacy to dose Vancomycin consult **AND** vancomycin - pharmacy to dose    Allergies:     Review of patient's allergies indicates:   Allergen Reactions    Iodine containing multivitamin Swelling     itching    Keflex [cephalexin] Swelling     Eyes.  Tolerated multiple doses of zosyn and 1 dose of augmentin in  2015 and 2016, respectively    Peaches [peach (prunus persica)] Swelling     eyes    Shellfish containing products Swelling    Fig tree Swelling     itching    Tuberculin spenser test ppd Rash       Physical Exam:     Vitals:    09/26/22 1533   BP: (!) 126/58   Pulse: 77   Resp: 18   Temp: 97.1 °F (36.2 °C)       Eyes: Sclerae anicteric. Ears/nose/throat: Mucous membranes moist. Neck: No thyromegaly, lymphadenopathy, or jugular venous distention. Respiratory: Lungs clear to auscultation bilaterally. Cardiovascular: Heart was regular with normal first and second heart sounds. No S3 or S4. GI: Soft, nontender, nondistended, with normal bowel sounds. Musculoskeletal: extremities without cyanosis, clubbing or pitting edema. Neurologic: Patient is alert and oriented x3 and there is no focal strength deficit. Skin: no rashes, cuts, sores. Psychiatric: normal affect. Hematologic: no abnormal bruising or bleeding.    Laboratory Data:    BUN/Cr/glu/ALT/AST/amyl/lip:  22/1.7/--/22/20/--/-- (09/26 0423)  WBC/Hgb/Hct/Plts:  5.67/10.4/32.6/320 (09/26 0423)  PT/INR/PTT:  --/--/49.8 (09/26 0128)    Diagnostic Studies:         Impression:   72 y.o. male with:    Plan:   Patient with MRSA bacteremia in the setting of CRT-D in place  Plan:  -Will plan extraction for tomorrow  -Patient with no history of CABG or sternal incision.   -Need CTS back-up for this case.   -Hold heparin at midnight  -Type and screen   -Covid test   -2 IV accesses     Current plan of care discussed with the patient, all questions answered.   Current plan of care discussed with primary service.    Ramu Macedo   [facility]  PGY4 Cardiology Fellow

## 2022-09-26 NOTE — SUBJECTIVE & OBJECTIVE
Interval History: No acute issues overnight.    Resumed PO diuretics 9/21. Held diuretics 9/25, 9/26 as slight bump in cr and pt looks dry, CVP of 5. Plan for ICD lead extraction Tuesday 9/27/22. There after Followed by eval of Pancreatic mass for LVAD.       Continuous Infusions:   DOBUTamine IV infusion (non-titrating) 2.5 mcg/kg/min (09/26/22 0413)    heparin (porcine) in D5W 18 Units/kg/hr (09/26/22 1110)     Scheduled Meds:   acetaminophen  650 mg Oral QID    allopurinoL  200 mg Oral Daily    amiodarone  300 mg Oral Daily    aspirin  81 mg Oral Daily    atorvastatin  80 mg Oral Daily    heparin (PORCINE)  80 Units/kg (Adjusted) Intravenous Once    LIDOcaine  1 patch Transdermal Q24H    polyethylene glycol  17 g Oral Daily    sacubitriL-valsartan  1 tablet Oral BID    sodium chloride 0.9%  10 mL Intravenous Q6H    vancomycin (VANCOCIN) IVPB  750 mg Intravenous Once     PRN Meds:dextromethorphan-guaiFENesin  mg, heparin (PORCINE), heparin (PORCINE), HYDROcodone-acetaminophen, methocarbamoL, ondansetron, oxyCODONE, senna-docusate 8.6-50 mg, sodium chloride 0.9%, Flushing PICC Protocol **AND** sodium chloride 0.9% **AND** sodium chloride 0.9%, Pharmacy to dose Vancomycin consult **AND** vancomycin - pharmacy to dose    Review of patient's allergies indicates:   Allergen Reactions    Iodine containing multivitamin Swelling     itching    Keflex [cephalexin] Swelling     Eyes.  Tolerated multiple doses of zosyn and 1 dose of augmentin in 2015 and 2016, respectively    Peaches [peach (prunus persica)] Swelling     eyes    Shellfish containing products Swelling    Fig tree Swelling     itching    Tuberculin spenser test ppd Rash     Objective:     Vital Signs (Most Recent):  Temp: 97.3 °F (36.3 °C) (09/26/22 0759)  Pulse: 75 (09/26/22 0759)  Resp: 18 (09/26/22 0759)  BP: (!) 111/54 (09/26/22 0759)  SpO2: (!) 93 % (09/26/22 0759)   Vital Signs (24h Range):  Temp:  [96.9 °F (36.1 °C)-98.4 °F (36.9 °C)] 97.3 °F (36.3  °C)  Pulse:  [] 75  Resp:  [16-18] 18  SpO2:  [93 %-97 %] 93 %  BP: ()/(53-59) 111/54     Patient Vitals for the past 72 hrs (Last 3 readings):   Weight   09/26/22 0413 94.9 kg (209 lb 3.5 oz)   09/25/22 0500 94.4 kg (208 lb 3.6 oz)       Body mass index is 32.77 kg/m².      Intake/Output Summary (Last 24 hours) at 9/26/2022 1133  Last data filed at 9/26/2022 0400  Gross per 24 hour   Intake 780 ml   Output 950 ml   Net -170 ml         Hemodynamic Parameters:       Telemetry: no events on tele    Physical Exam  Physical Exam  Constitutional:       Appearance: Normal appearance. He is not ill-appearing.   Cardiovascular:      Rate and Rhythm: Normal rate and regular rhythm.      Pulses: Normal pulses.      Heart sounds: Normal heart sounds.   Elevated JVD.  Pulmonary:      Effort: Pulmonary effort is normal.      Breath sounds: Normal breath sounds.   Abdominal:      General: Abdomen is flat. Bowel sounds are normal. There is distension.      Palpations: Abdomen is soft.      Tenderness: There is abdominal tenderness.   Musculoskeletal:         General: No swelling   1+ pitting edema in b/l LE upto knee. Mild tenderness around left upper arm, no fluctuance.  Skin:     General: Skin is warm and dry.      Findings: Erythema present.   Neurological:      Mental Status: He is alert and oriented to person, place, and time. Mental status is at baseline.   Psychiatric:         Behavior: Behavior normal.         Thought Content: Thought content normal.       Significant Labs:  CBC:  Recent Labs   Lab 09/24/22 0521 09/25/22 0427 09/26/22 0423   WBC 5.18 5.25 5.67   RBC 3.88* 3.73* 3.80*   HGB 10.6* 10.2* 10.4*   HCT 32.9* 32.4* 32.6*    288 320   MCV 85 87 86   MCH 27.3 27.3 27.4   MCHC 32.2 31.5* 31.9*       BNP:  No results for input(s): BNP in the last 168 hours.    Invalid input(s): BNPTRIAGELBLO  CMP:  Recent Labs   Lab 09/24/22 0521 09/25/22 0427 09/26/22 0423   * 89 84   CALCIUM 8.4*  8.4* 8.8   ALBUMIN 2.9* 3.0* 3.1*   PROT 6.2 6.2 6.2   * 134* 137   K 3.7 4.1 4.0   CO2 24 27 23    100 103   BUN 22 24* 22   CREATININE 1.5* 1.8* 1.7*   ALKPHOS 78 84 80   ALT 19 19 22   AST 17 20 20   BILITOT 0.5 0.3 0.4        Coagulation:   Recent Labs   Lab 09/21/22  2309 09/22/22  0552 09/25/22  0427 09/25/22  1509 09/26/22  0128   INR 1.0  --   --   --   --    APTT 28.8   < > 35.6* 65.8* 49.8*    < > = values in this interval not displayed.       LDH:  No results for input(s): LDH in the last 72 hours.  Microbiology:  Microbiology Results (last 7 days)       Procedure Component Value Units Date/Time    Blood culture [[id number]] Collected: 09/18/22 1117    Order Status: Completed Specimen: Blood from Peripheral, Right Hand Updated: 09/23/22 1412     Blood Culture, Routine No growth after 5 days.    Blood culture [[id number]] Collected: 09/18/22 1105    Order Status: Completed Specimen: Blood from Antecubital, Right Arm Updated: 09/23/22 1212     Blood Culture, Routine No growth after 5 days.                Estimated Creatinine Clearance: 44.4 mL/min (A) (based on SCr of 1.7 mg/dL (H)).    Diagnostic Results:

## 2022-09-26 NOTE — NURSING
Received [employer] message from patient with questions about current plan of care. Met with patient and his sister, at the bedside, this afternoon to address patient and his sister's questions and concerns regarding the plan for ICD lead extraction scheduled for next week. Both patient and sister verbalize understanding of the plan at this time and have no other questions or concerns.

## 2022-09-26 NOTE — PLAN OF CARE
Dobutamine and heparin drips infusing as ordered. Patient compliant with fluid restriction. Monitoring intake and output. Educated patient on safety and risk for falls. Call bell and personal belongings within reach. Plan for ICD lead extraction 9/27.

## 2022-09-27 NOTE — BRIEF OP NOTE
: Dr. Tracey Berg MD  Date of procedure: 9/27/2022  Post-operative Diagnosis: Device infection    Procedure Performed: total system extraction of cardiac implantable device    Description of Procedure: The patient was brought to the EP lab in the fasting state. Prepped and draped in sterile fashion. Safety timeout was performed. Sedation administered by anesthesia staff. Ultrasound guided access of the right femoral vein x2 and left femoral vein x2. Bridge balloon through 12 Fr sheath and wire in right femoral vein. 9 Fr sheath for central access for anesthesia and wire in left femoral vein. Lidocaine for local anesthetic at device site. Incision made. Blunt and electrosurgical dissection to the level of the existing generator. Pocket swabbed and sent for culture. Generator and leads dissected free and removed from pocket. Pocket washed with antibiotic solution. Leads prepped for extraction. Laser lead extraction performed. All leads removed in tact. Tips sent for culture. Hemostasis achieved. Pocket closed.     EBL: <30 mL    Specimens Removed: None  No intraoperative complications, post extubation had hypoxia which resolved in lab, later with hypotension. Intraoperative HARRY with no change effusion.    1. Antibiotics per home/ID regimen  2. NO HEPARIN PRODUCTS  3. No lifting over 5 pounds  4. Dressing will be removed in AM by EP  5. Follow up in device clinic in 1 week for device and wound checks.     Dr Berg, the attending electrophysiologist, was present for the entirety of this procedure.    Juan Headley MD PGY8

## 2022-09-27 NOTE — TRANSFER OF CARE
"Anesthesia Transfer of Care Note    Patient: Audrey Oneil Jr.    Procedure(s) Performed: Procedure(s) (LRB):  EXTRACTION, ELECTRODE LEAD (N/A)  Transesophageal echo (HARRY) intra-procedure log documentation    Patient location: PACU    Anesthesia Type: general    Transport from OR: Transported from OR on 6-10 L/min O2 by face mask with adequate spontaneous ventilation. Continuous SpO2 monitoring in transport    Post pain: adequate analgesia    Post assessment: no apparent anesthetic complications and tolerated procedure well    Post vital signs: stable    Level of consciousness: awake and alert    Nausea/Vomiting: nausea    Complications: none    Transfer of care protocol was followed      Last vitals:   Visit Vitals  BP (!) 92/53 (BP Location: Left arm, Patient Position: Lying)   Pulse 102   Temp 36.7 °C (98.1 °F) (Oral)   Resp 18   Ht 5' 7" (1.702 m)   Wt 94.3 kg (208 lb)   SpO2 (!) 94%   BMI 32.58 kg/m²     "

## 2022-09-27 NOTE — PROGRESS NOTES
Pt arrived to PACU 10, was attached to monitor and per arterial line was found to be hypotensive 92/48 (62), as low as 78/40 (52). BP cuff on L arm correlates. Primary team HTS contacted, who come to see pt at bedside in PACU 10. [facility] calls anesthesiologist Dr Francois, who assesses pt at the bedside. [facility] calls Dr Hidalgo to also assess pt at the bedside. Per HTS the primary team, the decision is made to start an epinephrine 5mg/250cc gtt at 0.02 for a map of >60 and Sys>90. That the patient is on 2 pressors and to monitor hemodynamcis, the primary team wants a central line placed in the patient.  MD and Julieta SMITH at bedside in PACU 10 for [facility] central line placement. Consent obtained and signed by patient and RN as witness. Pt tolerated procedure without complications. CXR order in.

## 2022-09-27 NOTE — ANESTHESIA PROCEDURE NOTES
Arterial    Diagnosis: MRSA bacteremia    Patient location during procedure: done in OR  Procedure start time: 9/27/2022 7:28 AM  Timeout: 9/27/2022 7:28 AM  Procedure end time: 9/27/2022 7:35 AM    Staffing  Authorizing Provider: Apollo Francois MD  Performing Provider: Apollo Francois MD    Anesthesiologist was present at the time of the procedure.    Preanesthetic Checklist  Completed: patient identified, IV checked, site marked, risks and benefits discussed, surgical consent, monitors and equipment checked, pre-op evaluation, timeout performed and anesthesia consent givenArterial  Skin Prep: chlorhexidine gluconate  Local Infiltration: lidocaine  Orientation: left  Location: radial    Catheter Size: 20 G  Catheter placement by Ultrasound guidance. Heme positive aspiration all ports.   Vessel Caliber: medium, patent  Needle advanced into vessel with real time Ultrasound guidance.Insertion Attempts: 1  Assessment  Dressing: secured with tape and tegaderm  Patient: Tolerated well

## 2022-09-27 NOTE — PLAN OF CARE
Problem: Adult Inpatient Plan of Care  Goal: Patient-Specific Goal (Individualized)  Outcome: Ongoing, Progressing   No injuries overnight. Patient vital signs stable. Plan of care discussed. NPO at midnight for AICD extraction. Call light within reach. Family at bedside, will continue to monitor.

## 2022-09-27 NOTE — PROGRESS NOTES
[facility]  Heart Transplant  Progress Note    Patient Name: Audrey Oneil Jr.  MRN: [id number]  Admission Date: 9/14/2022  Hospital Length of Stay: 13 days  Attending Physician: Cherelle Hidalgo DO  Primary Care Provider: Primary Doctor No  Principal Problem:Chronic combined systolic and diastolic congestive heart failure    Subjective:     Interval History: No acute issues overnight.    Underwent device extraction by EP today. Post op complicated by hypotension with MAPs in the 50s and respiratory distress with hypoxia, satting at 94 % on 4l. Pt received 1200cc of isotonic fluid perioperatively and 60 iv lasix thereafter. Left IJ central line was placed and Dobutamine increased to 5, started on epi 0.02. Lactate of 0.9 and Mvo2 59.     Continuous Infusions:   DOBUTamine IV infusion (non-titrating) 5 mcg/kg/min (09/27/22 1620)    EPINEPHrine 0.02 mcg/kg/min (09/27/22 1620)     Scheduled Meds:   acetaminophen  650 mg Oral QID    allopurinoL  200 mg Oral Daily    amiodarone  300 mg Oral Daily    aspirin  81 mg Oral Daily    atorvastatin  80 mg Oral Daily    EPINEPHrine        EPINEPHrine        heparin (PORCINE)  80 Units/kg (Adjusted) Intravenous Once    LIDOcaine  1 patch Transdermal Q24H    LIDOcaine HCL 10 mg/ml (1%)        phenylephrine HCl in 0.9% NaCl        polyethylene glycol  17 g Oral Daily    sodium chloride 0.9%  10 mL Intravenous Q6H     PRN Meds:sodium chloride, BUPivacaine (PF) 0.25% (2.5 mg/ml), dextromethorphan-guaiFENesin  mg, diphenhydrAMINE, fentaNYL, heparin (PORCINE), heparin (PORCINE), HYDROcodone-acetaminophen, HYDROmorphone, LIDOcaine HCL 10 mg/ml (1%), methocarbamoL, ondansetron, ondansetron, senna-docusate 8.6-50 mg, sodium chloride 0.9%, Flushing PICC Protocol **AND** sodium chloride 0.9% **AND** sodium chloride 0.9%, sodium chloride 0.9%, Pharmacy to dose Vancomycin consult **AND** vancomycin - pharmacy to dose, vancomycin    Review of patient's  allergies indicates:   Allergen Reactions    Iodine containing multivitamin Swelling     itching    Keflex [cephalexin] Swelling     Eyes.  Tolerated multiple doses of zosyn and 1 dose of augmentin in 2015 and 2016, respectively    Peaches [peach (prunus persica)] Swelling     eyes    Shellfish containing products Swelling    Fig tree Swelling     itching    Tuberculin spenser test ppd Rash     Objective:     Vital Signs (Most Recent):  Temp: 97.4 °F (36.3 °C) (09/27/22 1600)  Pulse: 98 (09/27/22 1615)  Resp: 15 (09/27/22 1615)  BP: (!) 104/56 (09/27/22 1615)  SpO2: 100 % (09/27/22 1615)   Vital Signs (24h Range):  Temp:  [97.4 °F (36.3 °C)-98.1 °F (36.7 °C)] 97.4 °F (36.3 °C)  Pulse:  [] 98  Resp:  [15-28] 15  SpO2:  [93 %-100 %] 100 %  BP: ()/(50-62) 104/56  Arterial Line BP: ()/(40-56) 111/51     Patient Vitals for the past 72 hrs (Last 3 readings):   Weight   09/27/22 0804 94.3 kg (208 lb)   09/27/22 0500 94.8 kg (208 lb 15.9 oz)   09/26/22 0413 94.9 kg (209 lb 3.5 oz)       Body mass index is 32.58 kg/m².           Telemetry: no events on tele    Physical Exam  Physical Exam  Constitutional:       Appearance: Normal appearance. He is not ill-appearing.   Cardiovascular:      Rate and Rhythm: Normal rate and regular rhythm.      Pulses: Normal pulses.      Heart sounds: Normal heart sounds.   Elevated JVD.  Pulmonary:      Effort: Pulmonary effort is normal. B/l crackles over basal region.  Abdominal:      General: Abdomen is flat. Bowel sounds are normal. There is distension.      Palpations: Abdomen is soft.      Tenderness: There is abdominal tenderness.   Musculoskeletal:         General: No swelling   1+ pitting edema in b/l LE upto knee. Mild tenderness around left upper arm, no fluctuance.  Skin:     General: Skin is warm and dry. Left chest covered by gauze ( postoperative from device extraction)     Neurological:      Mental Status: He is alert and oriented to person, place, and  time. Mental status is at baseline.   Psychiatric:         Behavior: Behavior normal.         Thought Content: Thought content normal.       Significant Labs:  CBC:  Recent Labs   Lab 09/26/22 0423 09/27/22  0712 09/27/22  1406   WBC 5.67 6.29 16.33*   RBC 3.80* 4.13* 4.07*   HGB 10.4* 11.4* 11.2*   HCT 32.6* 35.5* 36.3*    368 343   MCV 86 86 89   MCH 27.4 27.6 27.5   MCHC 31.9* 32.1 30.9*       BNP:  No results for input(s): BNP in the last 168 hours.    Invalid input(s): BNPTRIAGELBLO  CMP:  Recent Labs   Lab 09/26/22 0423 09/27/22  0712 09/27/22  1406   GLU 84 102 156*   CALCIUM 8.8 9.0 8.3*   ALBUMIN 3.1* 3.4* 3.1*   PROT 6.2 6.8 6.2    137 135*   K 4.0 4.1 4.1   CO2 23 22* 22*    104 104   BUN 22 21 20   CREATININE 1.7* 1.6* 1.7*   ALKPHOS 80 86 82   ALT 22 20 23   AST 20 20 26   BILITOT 0.4 0.4 0.4        Coagulation:   Recent Labs   Lab 09/21/22  2309 09/22/22  0552 09/25/22  0427 09/25/22  1509 09/26/22  0128   INR 1.0  --   --   --   --    APTT 28.8   < > 35.6* 65.8* 49.8*    < > = values in this interval not displayed.       LDH:  No results for input(s): LDH in the last 72 hours.  Microbiology:  Microbiology Results (last 7 days)       Procedure Component Value Units Date/Time    Aerobic culture [[id number]] Collected: 09/27/22 1133    Order Status: Sent Specimen: Abscess from Heart Updated: 09/27/22 1204    Culture, Anaerobe [[id number]] Collected: 09/27/22 1133    Order Status: Sent Specimen: Abscess from Heart Updated: 09/27/22 1203    Culture, Anaerobe [[id number]] Collected: 09/27/22 1138    Order Status: Sent Specimen: Abscess from Heart Updated: 09/27/22 1157    Aerobic culture [[id number]] Collected: 09/27/22 1138    Order Status: Sent Specimen: Abscess from Heart Updated: 09/27/22 1156    Culture, Anaerobe [[id number]]     Order Status: No result Specimen: Abscess from Heart     Aerobic culture [[id number]]     Order Status: No result Specimen: Abscess from Heart     Culture,  Anaerobe [[id number]] Collected: 09/27/22 1032    Order Status: Sent Specimen: Abscess from Heart Updated: 09/27/22 1106    Aerobic culture [[id number]] Collected: 09/27/22 1032    Order Status: Sent Specimen: Abscess from Heart Updated: 09/27/22 1106    Aerobic culture [[id number]] Collected: 09/27/22 1035    Order Status: Sent Specimen: Abscess from Heart Updated: 09/27/22 1105    Culture, Anaerobe [[id number]] Collected: 09/27/22 1035    Order Status: Sent Specimen: Abscess from Heart Updated: 09/27/22 1105    Aerobic culture [[id number]] Collected: 09/27/22 1026    Order Status: Sent Specimen: Abscess from Heart Updated: 09/27/22 1055    Culture, Anaerobe [[id number]] Collected: 09/27/22 1026    Order Status: Sent Specimen: Abscess from Heart Updated: 09/27/22 1054    Aerobic culture [[id number]] Collected: 09/27/22 0936    Order Status: Sent Specimen: Abscess from Chest, Left Updated: 09/27/22 1014    Culture, Anaerobe [[id number]] Collected: 09/27/22 0936    Order Status: Sent Specimen: Abscess from Chest, Left Updated: 09/27/22 1014    Culture, Anaerobe [[id number]] Collected: 09/27/22 0947    Order Status: Sent Specimen: Abscess from Chest, Left Updated: 09/27/22 1013    Aerobic culture [[id number]] Collected: 09/27/22 0947    Order Status: Sent Specimen: Abscess from Chest, Left Updated: 09/27/22 1013    Aerobic culture [[id number]] Collected: 09/27/22 0952    Order Status: Sent Specimen: Abscess from Chest, Left Updated: 09/27/22 1012    Culture, Anaerobe [[id number]] Collected: 09/27/22 0952    Order Status: Sent Specimen: Abscess from Chest, Left Updated: 09/27/22 1012    Blood culture [[id number]] Collected: 09/18/22 1117    Order Status: Completed Specimen: Blood from Peripheral, Right Hand Updated: 09/23/22 1412     Blood Culture, Routine No growth after 5 days.    Blood culture [[id number]] Collected: 09/18/22 1105    Order Status: Completed Specimen: Blood from Antecubital, Right Arm Updated: 09/23/22 121      Blood Culture, Routine No growth after 5 days.                Estimated Creatinine Clearance: 44.3 mL/min (A) (based on SCr of 1.7 mg/dL (H)).    Diagnostic Results:      Assessment and Plan:     71 yo WM being worked up for LVAD since hospitalization January 2022 for recurrent VT (he thinks had MI) and cardiogenic shock at [facility]. He was  later seen in [facility] where he was treated for cardiogenic shock and sent home on .  He remains on  and is pursuing evaluation for LVAD.     His case was discussed at selection committee and he was deferred pending evaluation of pancreatic mass.  They wish to proceed with MRI but not sure with his ICD if this will be possible.     Meanwhile Presents with MRSA bacteremia at this admission.     HARRY 9/21/22: Not concerning for Vegetations. BCx negative 9/18    Post op Acute Hypoxic respiratory failure and Hypotension (POD #0) in the setting of volume overload from periopertaive fluids.  Underwent device extraction by EP today. Post op complicated by hypotension with MAPs in the 50s and respiratory distress with hypoxia, satting at 94 % on 4l. Pt received 1200cc of isotonic fluid perioperatively and 60 iv lasix thereafter. Left IJ central line was placed and Dobutamine increased to 5, started on epi 0.02. Lactate of 0.9 and Mvo2 59.   Will diurese with total 80 iv lasix and monitor closely in ICU. Will repeat lactate , get a CVP and MVo2. Will sign out to the night team.            * Chronic combined systolic and diastolic congestive heart failure  - On Dobutamine 5 .   - will hold entresto in the setting of above.   - restarted  po diueretics 9/21. ( lasix 40 OD  and aldactone 25 OD). But was on  Hold  9/25, 9/26 due to elev cr/ received 80 Iv lasix today postoperatively in the above setting. Will reassess and decide on diuretic regimen.   - Daily weights, Strict I/Os  - Monitor electrolytes and Maintain Mg >2 and K >4  -Telemetry monitoring           Pancreatic lesion  -  Gastroenterology consult and recommendations appreciated  - Unable to obtain MRI for further evaluation given CRT-D  - Will hold off on obtaining CT with pancreatic protocol at this time in view of active infection requiring vancomycin and risk of contrast-induced nephrotoxicity in this patient     Bacteremia due to methicillin resistant Staphylococcus aureus  - Monitor WBC count and fever curve, pan-culture if patient spikes  - ID consult and recommendations are appreciated  - Vancomycin 1,250, daily; Monitor level and renal panel  -patient has persistent positive blood cultures.  Cultures from 18 no growth so far.  His currently on vancomycin.  Id on board.  - HARRY 9/21 not concerning for any vegetations  -Device extraction done today.POD #0  -will eventually need abx for 6 wks following lead removal.     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 Has a functional Rt arm PICC.      Benton Rendon MD  Heart Transplant  [facility]

## 2022-09-27 NOTE — PROCEDURES
"Audrey Oneil Jr. is a 72 y.o. male patient.    Temp: 97.5 °F (36.4 °C) (09/27/22 1313)  Pulse: 99 (09/27/22 1545)  Resp: 16 (09/27/22 1545)  BP: (!) 94/56 (09/27/22 1530)  SpO2: 99 % (09/27/22 1545)  Weight: 94.3 kg (208 lb) (09/27/22 0804)  Height: 5' 7" (170.2 cm) (09/27/22 0804)    Central Line    Date/Time: 9/27/2022 4:14 PM  Performed by: Benton Rendon MD  Authorized by: Benton Rendon MD     Supervisor Name:  Dr. Jd Duvall  Location procedure was performed:  [facility] HE[facility]PL[facility]  Assisting Provider:  Benton Rendon MD  Pre-operative diagnosis:  Congestive heart failure  Post-operative diagnosis:  Congestive heart failure  Consent Done ?:  Yes  Time out complete?: Verified correct patient, procedure, equipment, staff, and site/side    Indications:  Vascular access and hemodynamic monitoring  Anesthesia:  Local infiltration  Local anesthetic:  Lidocaine 1% without epinephrine  Preparation:  Skin prepped with ChloraPrep  Skin prep agent dried: Skin prep agent completely dried prior to procedure    Sterile barriers: All five maximal sterile barriers used - gloves, gown, cap, mask and large sterile sheet    Hand hygiene: Hand hygiene performed immediately prior to central venous catheter insertion    Location:  Left internal jugular  Catheter size:  7 Fr  Ultrasound guidance: Yes    Vessel Caliber:  Medium   patent  Comprressibility:  Normal  Needle advanced into vessel with real time ultrasound guidance.    Guidewire confirmed in vessel.    Image recorded and saved.    Steril sheath on probe.    Sterile gel used.  Manometry: Yes    Number of attempts:  1  Securement:  Line sutured, blood return through all ports, sterile dressing applied and chlorhexidine patch  Complications: No    Estimated blood loss (mL):  10  Specimens: No    Implants: No    XRay:  Placement verified by x-ray and no pneumothorax on x-ray  Adverse Events:  NoneTermination Site: superior vena cava    No flowsheet data " found.      9/27/2022

## 2022-09-27 NOTE — ANESTHESIA PROCEDURE NOTES
Intubation    Date/Time: 9/27/2022 7:45 AM  Performed by: Carol Ann Arroyo  Authorized by: Apollo Francois MD     Intubation:     Intubated:  Postinduction    Mask Ventilation:  Easy mask    Attempts:  1    Attempted By:  Student    Method of Intubation:  Direct    Blade:  Phipps 2    Laryngeal View Grade: Grade IIA - cords partially seen      Difficult Airway Encountered?: No      Complications:  None    Airway Device:  Oral endotracheal tube    Airway Device Size:  7.5    Style/Cuff Inflation:  Cuffed    Inflation Amount (mL):  5    Tube secured:  23    Secured at:  The lips    Placement Verified By:  Capnometry    Complicating Factors:  None    Findings Post-Intubation:  BS equal bilateral and atraumatic/condition of teeth unchanged

## 2022-09-27 NOTE — NURSING TRANSFER
Nursing Transfer Note      9/27/2022     Reason patient is being transferred: higher level of care    Transfer To: [facility] CICU    Transfer via stretcher    Transfer with 2L NC to O2, cardiac monitoring, NIBP, arterial line    Transported by RN x1     Medicines sent: , epi gtt    Any special needs or follow-up needed: routine    Chart send with patient: Yes    Notified: spouse, sister    Patient reassessed at: 09/27/2022 at 1600

## 2022-09-27 NOTE — ANESTHESIA POSTPROCEDURE EVALUATION
Anesthesia Post Evaluation    Patient: Audrey Oneil Jr.    Procedure(s) Performed: Procedure(s) (LRB):  EXTRACTION, ELECTRODE LEAD (N/A)  Transesophageal echo (HARRY) intra-procedure log documentation    Final Anesthesia Type: general      Patient location during evaluation: PACU  Patient participation: Yes- Able to Participate  Level of consciousness: awake and alert  Post-procedure vital signs: reviewed and stable  Pain management: adequate  Airway patency: patent    PONV status at discharge: No PONV  Anesthetic complications: no      Cardiovascular status: hemodynamically stable  Respiratory status: nasal cannula  Hydration status: hypervolemic  Follow-up not needed.          Vitals Value Taken Time   BP 85/54 09/27/22 1354   Temp 36.4 °C (97.5 °F) 09/27/22 1313   Pulse 103 09/27/22 1358   Resp 17 09/27/22 1358   SpO2 93 % 09/27/22 1358   Vitals shown include unvalidated device data.      No case tracking events are documented in the log.      Pain/Magaly Score: Pain Rating Prior to Med Admin: 7 (9/26/2022  9:09 PM)  Pain Rating Post Med Admin: 0 (9/26/2022 10:09 PM)  Magaly Score: 8 (9/27/2022  1:45 PM)

## 2022-09-27 NOTE — SUBJECTIVE & OBJECTIVE
Interval History: No acute issues overnight.    Underwent device extraction by EP today. Post op complicated by hypotension with MAPs in the 50s and respiratory distress with hypoxia, satting at 94 % on 4l. Pt received 1200cc of isotonic fluid perioperatively and 60 iv lasix thereafter. Left IJ central line was placed and Dobutamine increased to 5, started on epi 0.02. Lactate of 0.9 and Mvo2 59.     Continuous Infusions:   DOBUTamine IV infusion (non-titrating) 5 mcg/kg/min (09/27/22 1620)    EPINEPHrine 0.02 mcg/kg/min (09/27/22 1620)     Scheduled Meds:   acetaminophen  650 mg Oral QID    allopurinoL  200 mg Oral Daily    amiodarone  300 mg Oral Daily    aspirin  81 mg Oral Daily    atorvastatin  80 mg Oral Daily    EPINEPHrine        EPINEPHrine        heparin (PORCINE)  80 Units/kg (Adjusted) Intravenous Once    LIDOcaine  1 patch Transdermal Q24H    LIDOcaine HCL 10 mg/ml (1%)        phenylephrine HCl in 0.9% NaCl        polyethylene glycol  17 g Oral Daily    sodium chloride 0.9%  10 mL Intravenous Q6H     PRN Meds:sodium chloride, BUPivacaine (PF) 0.25% (2.5 mg/ml), dextromethorphan-guaiFENesin  mg, diphenhydrAMINE, fentaNYL, heparin (PORCINE), heparin (PORCINE), HYDROcodone-acetaminophen, HYDROmorphone, LIDOcaine HCL 10 mg/ml (1%), methocarbamoL, ondansetron, ondansetron, senna-docusate 8.6-50 mg, sodium chloride 0.9%, Flushing PICC Protocol **AND** sodium chloride 0.9% **AND** sodium chloride 0.9%, sodium chloride 0.9%, Pharmacy to dose Vancomycin consult **AND** vancomycin - pharmacy to dose, vancomycin    Review of patient's allergies indicates:   Allergen Reactions    Iodine containing multivitamin Swelling     itching    Keflex [cephalexin] Swelling     Eyes.  Tolerated multiple doses of zosyn and 1 dose of augmentin in 2015 and 2016, respectively    Peaches [peach (prunus persica)] Swelling     eyes    Shellfish containing products Swelling    Fig tree Swelling     itching    Tuberculin spenser  test ppd Rash     Objective:     Vital Signs (Most Recent):  Temp: 97.4 °F (36.3 °C) (09/27/22 1600)  Pulse: 98 (09/27/22 1615)  Resp: 15 (09/27/22 1615)  BP: (!) 104/56 (09/27/22 1615)  SpO2: 100 % (09/27/22 1615)   Vital Signs (24h Range):  Temp:  [97.4 °F (36.3 °C)-98.1 °F (36.7 °C)] 97.4 °F (36.3 °C)  Pulse:  [] 98  Resp:  [15-28] 15  SpO2:  [93 %-100 %] 100 %  BP: ()/(50-62) 104/56  Arterial Line BP: ()/(40-56) 111/51     Patient Vitals for the past 72 hrs (Last 3 readings):   Weight   09/27/22 0804 94.3 kg (208 lb)   09/27/22 0500 94.8 kg (208 lb 15.9 oz)   09/26/22 0413 94.9 kg (209 lb 3.5 oz)       Body mass index is 32.58 kg/m².           Telemetry: no events on tele    Physical Exam  Physical Exam  Constitutional:       Appearance: Normal appearance. He is not ill-appearing.   Cardiovascular:      Rate and Rhythm: Normal rate and regular rhythm.      Pulses: Normal pulses.      Heart sounds: Normal heart sounds.   Elevated JVD.  Pulmonary:      Effort: Pulmonary effort is normal. B/l crackles over basal region.  Abdominal:      General: Abdomen is flat. Bowel sounds are normal. There is distension.      Palpations: Abdomen is soft.      Tenderness: There is abdominal tenderness.   Musculoskeletal:         General: No swelling   1+ pitting edema in b/l LE upto knee. Mild tenderness around left upper arm, no fluctuance.  Skin:     General: Skin is warm and dry. Left chest covered by gauze ( postoperative from device extraction)     Neurological:      Mental Status: He is alert and oriented to person, place, and time. Mental status is at baseline.   Psychiatric:         Behavior: Behavior normal.         Thought Content: Thought content normal.       Significant Labs:  CBC:  Recent Labs   Lab 09/26/22  0423 09/27/22  0712 09/27/22  1406   WBC 5.67 6.29 16.33*   RBC 3.80* 4.13* 4.07*   HGB 10.4* 11.4* 11.2*   HCT 32.6* 35.5* 36.3*    368 343   MCV 86 86 89   MCH 27.4 27.6 27.5   MCHC  31.9* 32.1 30.9*       BNP:  No results for input(s): BNP in the last 168 hours.    Invalid input(s): BNPTRIAGELBLO  CMP:  Recent Labs   Lab 09/26/22  0423 09/27/22  0712 09/27/22  1406   GLU 84 102 156*   CALCIUM 8.8 9.0 8.3*   ALBUMIN 3.1* 3.4* 3.1*   PROT 6.2 6.8 6.2    137 135*   K 4.0 4.1 4.1   CO2 23 22* 22*    104 104   BUN 22 21 20   CREATININE 1.7* 1.6* 1.7*   ALKPHOS 80 86 82   ALT 22 20 23   AST 20 20 26   BILITOT 0.4 0.4 0.4        Coagulation:   Recent Labs   Lab 09/21/22  2309 09/22/22  0552 09/25/22  0427 09/25/22  1509 09/26/22  0128   INR 1.0  --   --   --   --    APTT 28.8   < > 35.6* 65.8* 49.8*    < > = values in this interval not displayed.       LDH:  No results for input(s): LDH in the last 72 hours.  Microbiology:  Microbiology Results (last 7 days)       Procedure Component Value Units Date/Time    Aerobic culture [[id number]] Collected: 09/27/22 1133    Order Status: Sent Specimen: Abscess from Heart Updated: 09/27/22 1204    Culture, Anaerobe [[id number]] Collected: 09/27/22 1133    Order Status: Sent Specimen: Abscess from Heart Updated: 09/27/22 1203    Culture, Anaerobe [[id number]] Collected: 09/27/22 1138    Order Status: Sent Specimen: Abscess from Heart Updated: 09/27/22 1157    Aerobic culture [[id number]] Collected: 09/27/22 1138    Order Status: Sent Specimen: Abscess from Heart Updated: 09/27/22 1156    Culture, Anaerobe [[id number]]     Order Status: No result Specimen: Abscess from Heart     Aerobic culture [[id number]]     Order Status: No result Specimen: Abscess from Heart     Culture, Anaerobe [[id number]] Collected: 09/27/22 1032    Order Status: Sent Specimen: Abscess from Heart Updated: 09/27/22 1106    Aerobic culture [[id number]] Collected: 09/27/22 1032    Order Status: Sent Specimen: Abscess from Heart Updated: 09/27/22 1106    Aerobic culture [[id number]] Collected: 09/27/22 1035    Order Status: Sent Specimen: Abscess from Heart Updated: 09/27/22 1105     Culture, Anaerobe [[id number]] Collected: 09/27/22 1035    Order Status: Sent Specimen: Abscess from Heart Updated: 09/27/22 1105    Aerobic culture [[id number]] Collected: 09/27/22 1026    Order Status: Sent Specimen: Abscess from Heart Updated: 09/27/22 1055    Culture, Anaerobe [[id number]] Collected: 09/27/22 1026    Order Status: Sent Specimen: Abscess from Heart Updated: 09/27/22 1054    Aerobic culture [[id number]] Collected: 09/27/22 0936    Order Status: Sent Specimen: Abscess from Chest, Left Updated: 09/27/22 1014    Culture, Anaerobe [[id number]] Collected: 09/27/22 0936    Order Status: Sent Specimen: Abscess from Chest, Left Updated: 09/27/22 1014    Culture, Anaerobe [[id number]] Collected: 09/27/22 0947    Order Status: Sent Specimen: Abscess from Chest, Left Updated: 09/27/22 1013    Aerobic culture [[id number]] Collected: 09/27/22 0947    Order Status: Sent Specimen: Abscess from Chest, Left Updated: 09/27/22 1013    Aerobic culture [[id number]] Collected: 09/27/22 0952    Order Status: Sent Specimen: Abscess from Chest, Left Updated: 09/27/22 1012    Culture, Anaerobe [[id number]] Collected: 09/27/22 0952    Order Status: Sent Specimen: Abscess from Chest, Left Updated: 09/27/22 1012    Blood culture [[id number]] Collected: 09/18/22 1117    Order Status: Completed Specimen: Blood from Peripheral, Right Hand Updated: 09/23/22 1412     Blood Culture, Routine No growth after 5 days.    Blood culture [[id number]] Collected: 09/18/22 1105    Order Status: Completed Specimen: Blood from Antecubital, Right Arm Updated: 09/23/22 1212     Blood Culture, Routine No growth after 5 days.                Estimated Creatinine Clearance: 44.3 mL/min (A) (based on SCr of 1.7 mg/dL (H)).    Diagnostic Results:

## 2022-09-28 PROBLEM — R57.9 SHOCK, UNSPECIFIED: Status: ACTIVE | Noted: 2022-01-01

## 2022-09-28 PROBLEM — R78.81 MRSA BACTEREMIA: Status: ACTIVE | Noted: 2022-01-01

## 2022-09-28 PROBLEM — B95.62 MRSA BACTEREMIA: Status: ACTIVE | Noted: 2022-01-01

## 2022-09-28 PROBLEM — I25.5 ISCHEMIC CARDIOMYOPATHY: Status: ACTIVE | Noted: 2022-01-01

## 2022-09-28 PROBLEM — I49.5 SSS (SICK SINUS SYNDROME): Status: ACTIVE | Noted: 2022-01-01

## 2022-09-28 NOTE — PROGRESS NOTES
Clinical Cardiac Electrophysiology Follow-Up Note     Date:  9/28/2022  Requesting attending:  Cherelle Hidalgo DO    Chief Complaint/Reason for Consultation:    Patient with MRSA bacteremia. EP consulted for device extraction     Problem List:   1. MRSA bacteremia   2. 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 Teofilo Moore , and upgrade to a [employer] CRT-D in 5/2019 by Dr. Teofilo Moore. s/p extraction 9/27/22  3. H/O ventricular tachycardia on amiodarone 300 mg daily  4. LBBB  5. New DVT located in L brachial vein STARTED on heparin gtt   6. CAD s/p STEMI s/p PCI LAD 2007  7. provoked PE/DVT in 2011  8. Former smoker    24 Hr Events and Subjective:   s/p Extraction  yesterday     TM: NSR    On   Dobutamine at 5  Epi 0.02 (Stopped this am)  Scheduled Meds:       acetaminophen  650 mg Oral QID    allopurinoL  200 mg Oral Daily    amiodarone  300 mg Oral Daily    aspirin  81 mg Oral Daily    atorvastatin  80 mg Oral Daily    LIDOcaine  1 patch Transdermal Q24H    polyethylene glycol  17 g Oral Daily    sodium chloride 0.9%  10 mL Intravenous Q6H     Continuous Infusions:      DOBUTamine IV infusion (non-titrating) 5 mcg/kg/min (09/28/22 0622)     PRN Meds:   sodium chloride, acetaminophen, BUPivacaine (PF) 0.25% (2.5 mg/ml), dextromethorphan-guaiFENesin  mg, diphenhydrAMINE, fentaNYL, HYDROcodone-acetaminophen, HYDROmorphone, LIDOcaine HCL 10 mg/ml (1%), methocarbamoL, ondansetron, ondansetron, senna-docusate 8.6-50 mg, sodium chloride 0.9%, Flushing PICC Protocol **AND** sodium chloride 0.9% **AND** sodium chloride 0.9%, sodium chloride 0.9%, Pharmacy to dose Vancomycin consult **AND** vancomycin - pharmacy to dose, vancomycin    Allergies:     Review of patient's allergies indicates:   Allergen Reactions    Iodine containing multivitamin Swelling     itching    Keflex [cephalexin] Swelling     Eyes.  Tolerated multiple doses of zosyn and 1  dose of augmentin in 2015 and 2016, respectively    Peaches [peach (prunus persica)] Swelling     eyes    Shellfish containing products Swelling    Fig tree Swelling     itching    Tuberculin spenser test ppd Rash       Physical Exam:     Vitals:    09/28/22 1100   BP:    Pulse: 86   Resp: 20   Temp:        Eyes: Sclerae anicteric. Ears/nose/throat: Mucous membranes moist. Neck: No thyromegaly, lymphadenopathy, or jugular venous distention. Respiratory: Lungs clear to auscultation bilaterally. Cardiovascular: Heart was regular with normal first and second heart sounds. No S3 or S4. GI: Soft, nontender, nondistended, with normal bowel sounds. Musculoskeletal: extremities without cyanosis, clubbing or pitting edema. Neurologic: Patient is alert and oriented x3 and there is no focal strength deficit. Skin: no rashes, cuts, sores. Psychiatric: normal affect. Hematologic: no abnormal bruising or bleeding.    Laboratory Data:      BUN/Cr/glu/ALT/AST/amyl/lip:  28/2.1/--/22/20/--/-- (09/28 0441)  WBC/Hgb/Hct/Plts:  9.43/10.3/32.8/177 (09/28 0441)     9/27/22 CXR no pneumothorax   9/27/22 2 PM  HB 11.2     Plan:      1. Antibiotics per home/ID regimen  2. No heparin products for now  3. No lifting over 5 pounds  4. Follow up in device clinic in 1 week for device and wound checks.     Please call [phone] for any questions     Follow attending attestation     Ramu Macedo   [facility]  PGY4 Cardiology Fellow      ADDENDUM: Patient will need a LifeVest to go home with. Orders placed and spoke with representative today.

## 2022-09-28 NOTE — PLAN OF CARE
Cardiac ICU Care Plan    POC reviewed with Audrey Oneil Jr. and family. Questions and concerns addressed. Pt progressing toward goals. Will continue to monitor. See below and flowsheets for full assessment and VS info.       Neuro:  Mcdaniel Coma Scale  Best Eye Response: 4-->(E4) spontaneous  Best Motor Response: 6-->(M6) obeys commands  Best Verbal Response: 5-->(V5) oriented  Mcdaniel Coma Scale Score: 15  Assessment Qualifiers: patient not sedated/intubated  Pupil PERRLA: yes    24 hr Temp:  [97.4 °F (36.3 °C)-98.4 °F (36.9 °C)]      CV:  Rhythm: normal sinus rhythm   DVT prophylaxis: VTE Required Core Measure: Pharmacological prophylaxis initiated/maintained    CVP (mean): (S) 2 mmHg (09/27/22 2305)    [REMOVED] PICC Double Lumen 09/22/22 1507 right basilic-Current Insertion Depth (cm): 36 cm (09/22/22 1507)  SVO2 (%): 58 % (09/26/22 1020)               Pulses  Right Radial Pulse: 2+ (normal)  Left Radial Pulse: 2+ (normal)  Right Dorsalis Pedis Pulse: 2+ (normal)  Left Dorsalis Pedis Pulse: 2+ (normal)  Right Posterior Tibial Pulse: 2+ (normal)  Left Posterior Tibial Pulse: 2+ (normal)    Resp:  O2 Device (Oxygen Therapy): nasal cannula  Flow (L/min): 2  Oxygen Concentration (%): 2    GI/:  GI prophylaxis: no  Diet/Nutrition Received: 2 gram sodium  Last Bowel Movement: 09/26/22  Voiding Characteristics: urethral catheter (bladder)       Urethral Catheter 09/27/22 0755 Double-lumen 16 Fr.-Reason for Continuing Urinary Catheterization: Post operative, Critically ill in ICU and requiring hourly monitoring of intake/output   Intake/Output Summary (Last 24 hours) at 9/28/2022 0724  Last data filed at 9/28/2022 0605  Gross per 24 hour   Intake 2309.36 ml   Output 1460 ml   Net 849.36 ml        Nutritional Supplement Intake: none    Labs/Accuchecks:  Recent Labs   Lab 09/26/22  0423 09/27/22  0712 09/27/22  1406   WBC 5.67 6.29 16.33*   RBC 3.80* 4.13* 4.07*   HGB 10.4* 11.4* 11.2*   HCT 32.6* 35.5* 36.3*   PLT  320 368 343      Recent Labs   Lab 09/21/22  2309 09/22/22  0552 09/25/22  0427 09/25/22  1509 09/26/22  0128   INR 1.0  --   --   --   --    APTT 28.8   < > 35.6* 65.8* 49.8*    < > = values in this interval not displayed.      Recent Labs     09/28/22  0441      K 4.7   CO2 22*      BUN 28*   CREATININE 2.1*   ALKPHOS 81   ALT 22   AST 20   BILITOT 0.4     No results for input(s): CPK, CPKMB, MB, TROPONINI in the last 168 hours.   Recent Labs     09/27/22  1355 09/27/22  1742 09/28/22  0453   PH 7.298* 7.330* 7.344*   PCO2 49.4* 46.4* 43.3   PO2 33* 34* 34*   HCO3 24.2 24.5 23.6*   POCSATURATED 57* 60* 61*   BE -2 -1 -2       Electrolytes: N/A - electrolytes WDL  Accuchecks: none    Gtts/LDAs:   DOBUTamine IV infusion (non-titrating) 5 mcg/kg/min (09/28/22 0622)    EPINEPHrine 0.02 mcg/kg/min (09/28/22 0605)       Lines/Drains/Airways       Drain  Duration                  Urethral Catheter 09/27/22 0755 Double-lumen 16 Fr. <1 day              Arterial Line  Duration             Arterial Line 09/27/22 0728 Left Radial <1 day              Peripheral Intravenous Line  Duration                  Peripheral IV - Single Lumen 09/26/22 2000 20 G;1 3/4 in Left Forearm 1 day         Peripheral IV - Single Lumen 09/26/22 2000 20 G;1 3/4 in Right Forearm 1 day         Peripheral IV - Single Lumen 09/27/22 0816 18 G Right Other <1 day                    Skin/Wounds  Bathing/Skin Care: linen changed (09/28/22 0005)  Wounds: No  Wound care consulted: No

## 2022-09-28 NOTE — PROGRESS NOTES
Pharmacokinetic Assessment Follow Up: IV Vancomycin    Vancomycin serum concentration assessment(s):  Vancomycin random level resulted today at 16.2. Goal trough level is 15-20. SCr is increased today from 1.7 to 2.1. Plan to administer reduced dose vancomycin 500mg iv x 1. Continue to follow up AM random levels.     Alexandria Barnes, PharmD, [facility]  Heart Transplant Clinical Specialist   Spectralink: [id number]    Drug levels (last 3 results):  Recent Labs   Lab Result Units 09/26/22  0423 09/27/22  0516 09/28/22  1116   Vancomycin, Random ug/mL 19.2 17.5 16.2        Patient brief summary:  Audrey Oneil Jr. is a 72 y.o. male initiated on antimicrobial therapy with IV Vancomycin for treatment of bacteremia    Drug Allergies:   Review of patient's allergies indicates:   Allergen Reactions    Iodine containing multivitamin Swelling     itching    Keflex [cephalexin] Swelling     Eyes.  Tolerated multiple doses of zosyn and 1 dose of augmentin in 2015 and 2016, respectively    Peaches [peach (prunus persica)] Swelling     eyes    Shellfish containing products Swelling    Fig tree Swelling     itching    Tuberculin spenser test ppd Rash       Actual Body Weight:   94.3 kg     Renal Function:   Estimated Creatinine Clearance: 35.8 mL/min (A) (based on SCr of 2.1 mg/dL (H)).,     Dialysis Method (if applicable):  N/A    CBC (last 72 hours):  Recent Labs   Lab Result Units 09/26/22  0423 09/27/22  0712 09/27/22  1406 09/28/22  0441   WBC K/uL 5.67 6.29 16.33* 9.43   Hemoglobin g/dL 10.4* 11.4* 11.2* 10.3*   Hematocrit % 32.6* 35.5* 36.3* 32.8*   Platelets K/uL 320 368 343 177   Gran % % 61.4 62.8 92.9* 86.8*   Lymph % % 28.2 27.0 4.6* 7.4*   Mono % % 7.2 7.2 1.7* 5.2   Eosinophil % % 1.8 1.6 0.1 0.0   Basophil % % 0.5 0.6 0.2 0.1   Differential Method  Automated Automated Automated Automated       Metabolic Panel (last 72 hours):  Recent Labs   Lab Result Units 09/26/22  0423 09/27/22  0712 09/27/22  1406 09/28/22  0441   Sodium  mmol/L 137 137 135* 136   Potassium mmol/L 4.0 4.1 4.1 4.7   Chloride mmol/L 103 104 104 102   CO2 mmol/L 23 22* 22* 22*   Glucose mg/dL 84 102 156* 144*   BUN mg/dL 22 21 20 28*   Creatinine mg/dL 1.7* 1.6* 1.7* 2.1*   Albumin g/dL 3.1* 3.4* 3.1* 3.5   Total Bilirubin mg/dL 0.4 0.4 0.4 0.4   Alkaline Phosphatase U/L 80 86 82 81   AST U/L 20 20 26 20   ALT U/L 22 20 23 22   Magnesium mg/dL  --   --  1.9  --        Vancomycin Administrations:  vancomycin given in the last 96 hours                     vancomycin (VANCOCIN) 1,000 mg in dextrose 5 % 250 mL IVPB (mg) 1,000 mg New Bag 09/27/22 0900    vancomycin injection (mg) 1,000 mg Given 09/27/22 0800    vancomycin 750 mg in dextrose 5 % 250 mL IVPB (ready to mix system) (mg) 750 mg New Bag 09/26/22 1115    vancomycin 1.25 g in dextrose 5% 250 mL IVPB (ready to mix) (mg) 1,250 mg New Bag 09/24/22 1618                    Microbiologic Results:  Microbiology Results (last 7 days)       Procedure Component Value Units Date/Time    Culture, Anaerobe [[id number]] Collected: 09/27/22 0947    Order Status: Completed Specimen: Abscess from Chest, Left Updated: 09/28/22 0738     Anaerobic Culture Culture in progress    Narrative:      Deep Pocket #1    Culture, Anaerobe [[id number]] Collected: 09/27/22 1035    Order Status: Completed Specimen: Abscess from Heart Updated: 09/28/22 0738     Anaerobic Culture Culture in progress    Narrative:      RA Lead drainage #2    Culture, Anaerobe [[id number]] Collected: 09/27/22 1133    Order Status: Completed Specimen: Abscess from Heart Updated: 09/28/22 0738     Anaerobic Culture Culture in progress    Narrative:      RA Lead Tip    Culture, Anaerobe [[id number]] Collected: 09/27/22 0952    Order Status: Completed Specimen: Abscess from Chest, Left Updated: 09/28/22 0738     Anaerobic Culture Culture in progress    Narrative:      Deep Pocket #2    Culture, Anaerobe [[id number]] Collected: 09/27/22 1026    Order Status: Completed Specimen:  Abscess from Heart Updated: 09/28/22 0738     Anaerobic Culture Culture in progress    Narrative:      LV Lead Tip    Culture, Anaerobe [[id number]] Collected: 09/27/22 1032    Order Status: Completed Specimen: Abscess from Heart Updated: 09/28/22 0738     Anaerobic Culture Culture in progress    Narrative:      RA Lead drainage #1    Culture, Anaerobe [[id number]] Collected: 09/27/22 1138    Order Status: Completed Specimen: Abscess from Heart Updated: 09/28/22 0738     Anaerobic Culture Culture in progress    Narrative:      RV Lead Tip    Culture, Anaerobe [[id number]] Collected: 09/27/22 0936    Order Status: Completed Specimen: Abscess from Chest, Left Updated: 09/28/22 0738     Anaerobic Culture Culture in progress    Narrative:      Shallow Pocket    Aerobic culture [[id number]] Collected: 09/27/22 0947    Order Status: Completed Specimen: Abscess from Chest, Left Updated: 09/28/22 0730     Aerobic Bacterial Culture No growth    Narrative:      Deep Pocket #1    Aerobic culture [[id number]] Collected: 09/27/22 0952    Order Status: Completed Specimen: Abscess from Chest, Left Updated: 09/28/22 0730     Aerobic Bacterial Culture No growth    Narrative:      Deep Pocket #2    Aerobic culture [[id number]] Collected: 09/27/22 1032    Order Status: Completed Specimen: Abscess from Heart Updated: 09/28/22 0730     Aerobic Bacterial Culture No growth    Narrative:      RA Lead drainage #1    Aerobic culture [[id number]] Collected: 09/27/22 1133    Order Status: Completed Specimen: Abscess from Heart Updated: 09/28/22 0730     Aerobic Bacterial Culture No growth    Narrative:      RA Lead Tip    Aerobic culture [[id number]] Collected: 09/27/22 0936    Order Status: Completed Specimen: Abscess from Chest, Left Updated: 09/28/22 0730     Aerobic Bacterial Culture No growth    Narrative:      Shallow pocket    Aerobic culture [[id number]] Collected: 09/27/22 1026    Order Status: Completed Specimen: Abscess from Heart Updated:  09/28/22 0730     Aerobic Bacterial Culture No growth    Narrative:      LV Lead Tip    Aerobic culture [[id number]] Collected: 09/27/22 1035    Order Status: Completed Specimen: Abscess from Heart Updated: 09/28/22 0730     Aerobic Bacterial Culture No growth    Narrative:      RA Lead drainage #2    Aerobic culture [[id number]] Collected: 09/27/22 1138    Order Status: Completed Specimen: Abscess from Heart Updated: 09/28/22 0730     Aerobic Bacterial Culture No growth    Narrative:      RV Lead Tip    Culture, Anaerobe [[id number]]     Order Status: No result Specimen: Abscess from Heart     Aerobic culture [[id number]]     Order Status: No result Specimen: Abscess from Heart     Blood culture [[id number]] Collected: 09/18/22 1117    Order Status: Completed Specimen: Blood from Peripheral, Right Hand Updated: 09/23/22 1412     Blood Culture, Routine No growth after 5 days.    Blood culture [[id number]] Collected: 09/18/22 1105    Order Status: Completed Specimen: Blood from Antecubital, Right Arm Updated: 09/23/22 1212     Blood Culture, Routine No growth after 5 days.

## 2022-09-28 NOTE — SUBJECTIVE & OBJECTIVE
Interval History:     Underwent device extraction by EP yesterday. Post op complicated by hypotension with MAPs in the 50s and respiratory distress with hypoxia, satting at 94 % on 4l. Pt received 1200cc of isotonic fluid perioperatively and 60 iv lasix thereafter. Left IJ central line was placed and Dobutamine increased to 5, started on epi 0.02. Lactate of 0.9 and Mvo2 59.     Overnight MAP's improved, weaned off epi, reduced Dobutamine from 5-2.5 which is his baseline. stable to be step down to CSU.  Cr bump, will give 250 cc fluids,hold diuretics. Will hold heparin for now per EP recommendations. Hold entresto for today.     Continuous Infusions:   sodium chloride 0.9% 100 mL/hr at 09/28/22 1316    DOBUTamine IV infusion (non-titrating) 2.5 mcg/kg/min (09/28/22 1256)     Scheduled Meds:   acetaminophen  650 mg Oral QID    allopurinoL  200 mg Oral Daily    amiodarone  300 mg Oral Daily    aspirin  81 mg Oral Daily    atorvastatin  80 mg Oral Daily    LIDOcaine  1 patch Transdermal Q24H    polyethylene glycol  17 g Oral Daily    sodium chloride 0.9%  10 mL Intravenous Q6H    vancomycin (VANCOCIN) IVPB  500 mg Intravenous Once     PRN Meds:sodium chloride, acetaminophen, BUPivacaine (PF) 0.25% (2.5 mg/ml), dextromethorphan-guaiFENesin  mg, diphenhydrAMINE, fentaNYL, HYDROcodone-acetaminophen, HYDROmorphone, LIDOcaine HCL 10 mg/ml (1%), methocarbamoL, ondansetron, ondansetron, senna-docusate 8.6-50 mg, sodium chloride 0.9%, Flushing PICC Protocol **AND** sodium chloride 0.9% **AND** sodium chloride 0.9%, sodium chloride 0.9%, Pharmacy to dose Vancomycin consult **AND** vancomycin - pharmacy to dose, vancomycin    Review of patient's allergies indicates:   Allergen Reactions    Iodine containing multivitamin Swelling     itching    Keflex [cephalexin] Swelling     Eyes.  Tolerated multiple doses of zosyn and 1 dose of augmentin in 2015 and 2016, respectively    Peaches [peach (prunus persica)] Swelling     eyes     Shellfish containing products Swelling    Fig tree Swelling     itching    Tuberculin spenser test ppd Rash     Objective:     Vital Signs (Most Recent):  Temp: 98.1 °F (36.7 °C) (09/28/22 0700)  Pulse: 82 (09/28/22 1400)  Resp: 13 (09/28/22 1400)  BP: (!) 96/48 (09/28/22 1200)  SpO2: 96 % (09/28/22 1400)   Vital Signs (24h Range):  Temp:  [97.4 °F (36.3 °C)-98.4 °F (36.9 °C)] 98.1 °F (36.7 °C)  Pulse:  [] 82  Resp:  [13-38] 13  SpO2:  [94 %-100 %] 96 %  BP: ()/(48-67) 96/48  Arterial Line BP: ()/(50-82) 115/54     Patient Vitals for the past 72 hrs (Last 3 readings):   Weight   09/27/22 0804 94.3 kg (208 lb)   09/27/22 0500 94.8 kg (208 lb 15.9 oz)   09/26/22 0413 94.9 kg (209 lb 3.5 oz)       Body mass index is 32.58 kg/m².           Telemetry: no events on tele    Physical Exam  Physical Exam  Constitutional:       Appearance: Normal appearance. He is not ill-appearing.   Cardiovascular:      Rate and Rhythm: Normal rate and regular rhythm.      Pulses: Normal pulses.      Heart sounds: Normal heart sounds.   Elevated JVD.  Pulmonary:      Effort: Pulmonary effort is normal. B/l crackles over basal region.  Abdominal:      General: Abdomen is flat. Bowel sounds are normal. There is distension.      Palpations: Abdomen is soft.      Tenderness: There is abdominal tenderness.   Musculoskeletal:         General: No swelling   1+ pitting edema in b/l LE upto knee. Mild tenderness around left upper arm, no fluctuance.  Skin:     General: Skin is warm and dry. Left chest covered by gauze ( postoperative from device extraction)     Neurological:      Mental Status: He is alert and oriented to person, place, and time. Mental status is at baseline.   Psychiatric:         Behavior: Behavior normal.         Thought Content: Thought content normal.       Significant Labs:  CBC:  Recent Labs   Lab 09/27/22  0712 09/27/22  1406 09/28/22  0441   WBC 6.29 16.33* 9.43   RBC 4.13* 4.07* 3.70*   HGB 11.4* 11.2*  10.3*   HCT 35.5* 36.3* 32.8*    343 177   MCV 86 89 89   MCH 27.6 27.5 27.8   MCHC 32.1 30.9* 31.4*       BNP:  No results for input(s): BNP in the last 168 hours.    Invalid input(s): BNPTRIAGELBLO  CMP:  Recent Labs   Lab 09/27/22  0712 09/27/22  1406 09/28/22  0441    156* 144*   CALCIUM 9.0 8.3* 9.1   ALBUMIN 3.4* 3.1* 3.5   PROT 6.8 6.2 6.7    135* 136   K 4.1 4.1 4.7   CO2 22* 22* 22*    104 102   BUN 21 20 28*   CREATININE 1.6* 1.7* 2.1*   ALKPHOS 86 82 81   ALT 20 23 22   AST 20 26 20   BILITOT 0.4 0.4 0.4        Coagulation:   Recent Labs   Lab 09/21/22  2309 09/22/22  0552 09/25/22  0427 09/25/22  1509 09/26/22  0128   INR 1.0  --   --   --   --    APTT 28.8   < > 35.6* 65.8* 49.8*    < > = values in this interval not displayed.       LDH:  No results for input(s): LDH in the last 72 hours.  Microbiology:  Microbiology Results (last 7 days)       Procedure Component Value Units Date/Time    Culture, Anaerobe [[id number]] Collected: 09/27/22 0947    Order Status: Completed Specimen: Abscess from Chest, Left Updated: 09/28/22 0738     Anaerobic Culture Culture in progress    Narrative:      Deep Pocket #1    Culture, Anaerobe [[id number]] Collected: 09/27/22 1035    Order Status: Completed Specimen: Abscess from Heart Updated: 09/28/22 0738     Anaerobic Culture Culture in progress    Narrative:      RA Lead drainage #2    Culture, Anaerobe [[id number]] Collected: 09/27/22 1133    Order Status: Completed Specimen: Abscess from Heart Updated: 09/28/22 0738     Anaerobic Culture Culture in progress    Narrative:      RA Lead Tip    Culture, Anaerobe [[id number]] Collected: 09/27/22 0952    Order Status: Completed Specimen: Abscess from Chest, Left Updated: 09/28/22 0738     Anaerobic Culture Culture in progress    Narrative:      Deep Pocket #2    Culture, Anaerobe [[id number]] Collected: 09/27/22 1026    Order Status: Completed Specimen: Abscess from Heart Updated: 09/28/22 0772      Anaerobic Culture Culture in progress    Narrative:      LV Lead Tip    Culture, Anaerobe [[id number]] Collected: 09/27/22 1032    Order Status: Completed Specimen: Abscess from Heart Updated: 09/28/22 0738     Anaerobic Culture Culture in progress    Narrative:      RA Lead drainage #1    Culture, Anaerobe [[id number]] Collected: 09/27/22 1138    Order Status: Completed Specimen: Abscess from Heart Updated: 09/28/22 0738     Anaerobic Culture Culture in progress    Narrative:      RV Lead Tip    Culture, Anaerobe [[id number]] Collected: 09/27/22 0936    Order Status: Completed Specimen: Abscess from Chest, Left Updated: 09/28/22 0738     Anaerobic Culture Culture in progress    Narrative:      Shallow Pocket    Aerobic culture [[id number]] Collected: 09/27/22 0947    Order Status: Completed Specimen: Abscess from Chest, Left Updated: 09/28/22 0730     Aerobic Bacterial Culture No growth    Narrative:      Deep Pocket #1    Aerobic culture [[id number]] Collected: 09/27/22 0952    Order Status: Completed Specimen: Abscess from Chest, Left Updated: 09/28/22 0730     Aerobic Bacterial Culture No growth    Narrative:      Deep Pocket #2    Aerobic culture [[id number]] Collected: 09/27/22 1032    Order Status: Completed Specimen: Abscess from Heart Updated: 09/28/22 0730     Aerobic Bacterial Culture No growth    Narrative:      RA Lead drainage #1    Aerobic culture [[id number]] Collected: 09/27/22 1133    Order Status: Completed Specimen: Abscess from Heart Updated: 09/28/22 0730     Aerobic Bacterial Culture No growth    Narrative:      RA Lead Tip    Aerobic culture [[id number]] Collected: 09/27/22 0936    Order Status: Completed Specimen: Abscess from Chest, Left Updated: 09/28/22 0730     Aerobic Bacterial Culture No growth    Narrative:      Shallow pocket    Aerobic culture [[id number]] Collected: 09/27/22 1026    Order Status: Completed Specimen: Abscess from Heart Updated: 09/28/22 0730     Aerobic Bacterial Culture No  growth    Narrative:      LV Lead Tip    Aerobic culture [[id number]] Collected: 09/27/22 1035    Order Status: Completed Specimen: Abscess from Heart Updated: 09/28/22 0730     Aerobic Bacterial Culture No growth    Narrative:      RA Lead drainage #2    Aerobic culture [[id number]] Collected: 09/27/22 1138    Order Status: Completed Specimen: Abscess from Heart Updated: 09/28/22 0730     Aerobic Bacterial Culture No growth    Narrative:      RV Lead Tip    Culture, Anaerobe [[id number]]     Order Status: No result Specimen: Abscess from Heart     Aerobic culture [[id number]]     Order Status: No result Specimen: Abscess from Heart     Blood culture [[id number]] Collected: 09/18/22 1117    Order Status: Completed Specimen: Blood from Peripheral, Right Hand Updated: 09/23/22 1412     Blood Culture, Routine No growth after 5 days.    Blood culture [[id number]] Collected: 09/18/22 1105    Order Status: Completed Specimen: Blood from Antecubital, Right Arm Updated: 09/23/22 1212     Blood Culture, Routine No growth after 5 days.                Estimated Creatinine Clearance: 35.8 mL/min (A) (based on SCr of 2.1 mg/dL (H)).    Diagnostic Results:

## 2022-09-28 NOTE — PROGRESS NOTES
[facility] - Cardiac Intensive Care  Heart Transplant  Progress Note    Patient Name: Audrey Oneil Jr.  MRN: [id number]  Admission Date: 9/14/2022  Hospital Length of Stay: 14 days  Attending Physician: Cherelle Hidalgo DO  Primary Care Provider: Primary Doctor No  Principal Problem:Chronic combined systolic and diastolic congestive heart failure    Subjective:     Interval History:     Underwent device extraction by EP yesterday. Post op complicated by hypotension with MAPs in the 50s and respiratory distress with hypoxia, satting at 94 % on 4l. Pt received 1200cc of isotonic fluid perioperatively and 60 iv lasix thereafter. Left IJ central line was placed and Dobutamine increased to 5, started on epi 0.02. Lactate of 0.9 and Mvo2 59.     Overnight MAP's improved, weaned off epi, reduced Dobutamine from 5-2.5 which is his baseline. stable to be step down to CSU.  Cr bump, will give 250 cc fluids,hold diuretics. Will hold heparin for now per EP recommendations. Hold entresto for today.     Continuous Infusions:   sodium chloride 0.9% 100 mL/hr at 09/28/22 1316    DOBUTamine IV infusion (non-titrating) 2.5 mcg/kg/min (09/28/22 1256)     Scheduled Meds:   acetaminophen  650 mg Oral QID    allopurinoL  200 mg Oral Daily    amiodarone  300 mg Oral Daily    aspirin  81 mg Oral Daily    atorvastatin  80 mg Oral Daily    LIDOcaine  1 patch Transdermal Q24H    polyethylene glycol  17 g Oral Daily    sodium chloride 0.9%  10 mL Intravenous Q6H    vancomycin (VANCOCIN) IVPB  500 mg Intravenous Once     PRN Meds:sodium chloride, acetaminophen, BUPivacaine (PF) 0.25% (2.5 mg/ml), dextromethorphan-guaiFENesin  mg, diphenhydrAMINE, fentaNYL, HYDROcodone-acetaminophen, HYDROmorphone, LIDOcaine HCL 10 mg/ml (1%), methocarbamoL, ondansetron, ondansetron, senna-docusate 8.6-50 mg, sodium chloride 0.9%, Flushing PICC Protocol **AND** sodium chloride 0.9% **AND** sodium chloride 0.9%, sodium chloride 0.9%, Pharmacy to  dose Vancomycin consult **AND** vancomycin - pharmacy to dose, vancomycin    Review of patient's allergies indicates:   Allergen Reactions    Iodine containing multivitamin Swelling     itching    Keflex [cephalexin] Swelling     Eyes.  Tolerated multiple doses of zosyn and 1 dose of augmentin in 2015 and 2016, respectively    Peaches [peach (prunus persica)] Swelling     eyes    Shellfish containing products Swelling    Fig tree Swelling     itching    Tuberculin spenser test ppd Rash     Objective:     Vital Signs (Most Recent):  Temp: 98.1 °F (36.7 °C) (09/28/22 0700)  Pulse: 82 (09/28/22 1400)  Resp: 13 (09/28/22 1400)  BP: (!) 96/48 (09/28/22 1200)  SpO2: 96 % (09/28/22 1400)   Vital Signs (24h Range):  Temp:  [97.4 °F (36.3 °C)-98.4 °F (36.9 °C)] 98.1 °F (36.7 °C)  Pulse:  [] 82  Resp:  [13-38] 13  SpO2:  [94 %-100 %] 96 %  BP: ()/(48-67) 96/48  Arterial Line BP: ()/(50-82) 115/54     Patient Vitals for the past 72 hrs (Last 3 readings):   Weight   09/27/22 0804 94.3 kg (208 lb)   09/27/22 0500 94.8 kg (208 lb 15.9 oz)   09/26/22 0413 94.9 kg (209 lb 3.5 oz)       Body mass index is 32.58 kg/m².           Telemetry: no events on tele    Physical Exam  Physical Exam  Constitutional:       Appearance: Normal appearance. He is not ill-appearing.   Cardiovascular:      Rate and Rhythm: Normal rate and regular rhythm.      Pulses: Normal pulses.      Heart sounds: Normal heart sounds.   Elevated JVD.  Pulmonary:      Effort: Pulmonary effort is normal. B/l crackles over basal region.  Abdominal:      General: Abdomen is flat. Bowel sounds are normal. There is distension.      Palpations: Abdomen is soft.      Tenderness: There is abdominal tenderness.   Musculoskeletal:         General: No swelling   1+ pitting edema in b/l LE upto knee. Mild tenderness around left upper arm, no fluctuance.  Skin:     General: Skin is warm and dry. Left chest covered by gauze ( postoperative from device  extraction)     Neurological:      Mental Status: He is alert and oriented to person, place, and time. Mental status is at baseline.   Psychiatric:         Behavior: Behavior normal.         Thought Content: Thought content normal.       Significant Labs:  CBC:  Recent Labs   Lab 09/27/22  0712 09/27/22  1406 09/28/22  0441   WBC 6.29 16.33* 9.43   RBC 4.13* 4.07* 3.70*   HGB 11.4* 11.2* 10.3*   HCT 35.5* 36.3* 32.8*    343 177   MCV 86 89 89   MCH 27.6 27.5 27.8   MCHC 32.1 30.9* 31.4*       BNP:  No results for input(s): BNP in the last 168 hours.    Invalid input(s): BNPTRIAGELBLO  CMP:  Recent Labs   Lab 09/27/22  0712 09/27/22  1406 09/28/22  0441    156* 144*   CALCIUM 9.0 8.3* 9.1   ALBUMIN 3.4* 3.1* 3.5   PROT 6.8 6.2 6.7    135* 136   K 4.1 4.1 4.7   CO2 22* 22* 22*    104 102   BUN 21 20 28*   CREATININE 1.6* 1.7* 2.1*   ALKPHOS 86 82 81   ALT 20 23 22   AST 20 26 20   BILITOT 0.4 0.4 0.4        Coagulation:   Recent Labs   Lab 09/21/22  2309 09/22/22  0552 09/25/22  0427 09/25/22  1509 09/26/22  0128   INR 1.0  --   --   --   --    APTT 28.8   < > 35.6* 65.8* 49.8*    < > = values in this interval not displayed.       LDH:  No results for input(s): LDH in the last 72 hours.  Microbiology:  Microbiology Results (last 7 days)       Procedure Component Value Units Date/Time    Culture, Anaerobe [[id number]] Collected: 09/27/22 0947    Order Status: Completed Specimen: Abscess from Chest, Left Updated: 09/28/22 0738     Anaerobic Culture Culture in progress    Narrative:      Deep Pocket #1    Culture, Anaerobe [[id number]] Collected: 09/27/22 1035    Order Status: Completed Specimen: Abscess from Heart Updated: 09/28/22 0738     Anaerobic Culture Culture in progress    Narrative:      RA Lead drainage #2    Culture, Anaerobe [[id number]] Collected: 09/27/22 1133    Order Status: Completed Specimen: Abscess from Heart Updated: 09/28/22 0738     Anaerobic Culture Culture in progress     Narrative:      RA Lead Tip    Culture, Anaerobe [[id number]] Collected: 09/27/22 0952    Order Status: Completed Specimen: Abscess from Chest, Left Updated: 09/28/22 0738     Anaerobic Culture Culture in progress    Narrative:      Deep Pocket #2    Culture, Anaerobe [[id number]] Collected: 09/27/22 1026    Order Status: Completed Specimen: Abscess from Heart Updated: 09/28/22 0738     Anaerobic Culture Culture in progress    Narrative:      LV Lead Tip    Culture, Anaerobe [[id number]] Collected: 09/27/22 1032    Order Status: Completed Specimen: Abscess from Heart Updated: 09/28/22 0738     Anaerobic Culture Culture in progress    Narrative:      RA Lead drainage #1    Culture, Anaerobe [[id number]] Collected: 09/27/22 1138    Order Status: Completed Specimen: Abscess from Heart Updated: 09/28/22 0738     Anaerobic Culture Culture in progress    Narrative:      RV Lead Tip    Culture, Anaerobe [[id number]] Collected: 09/27/22 0936    Order Status: Completed Specimen: Abscess from Chest, Left Updated: 09/28/22 0738     Anaerobic Culture Culture in progress    Narrative:      Shallow Pocket    Aerobic culture [[id number]] Collected: 09/27/22 0947    Order Status: Completed Specimen: Abscess from Chest, Left Updated: 09/28/22 0730     Aerobic Bacterial Culture No growth    Narrative:      Deep Pocket #1    Aerobic culture [[id number]] Collected: 09/27/22 0952    Order Status: Completed Specimen: Abscess from Chest, Left Updated: 09/28/22 0730     Aerobic Bacterial Culture No growth    Narrative:      Deep Pocket #2    Aerobic culture [[id number]] Collected: 09/27/22 1032    Order Status: Completed Specimen: Abscess from Heart Updated: 09/28/22 0730     Aerobic Bacterial Culture No growth    Narrative:      RA Lead drainage #1    Aerobic culture [[id number]] Collected: 09/27/22 1133    Order Status: Completed Specimen: Abscess from Heart Updated: 09/28/22 0730     Aerobic Bacterial Culture No growth    Narrative:      RA Lead  Tip    Aerobic culture [[id number]] Collected: 09/27/22 0936    Order Status: Completed Specimen: Abscess from Chest, Left Updated: 09/28/22 0730     Aerobic Bacterial Culture No growth    Narrative:      Shallow pocket    Aerobic culture [[id number]] Collected: 09/27/22 1026    Order Status: Completed Specimen: Abscess from Heart Updated: 09/28/22 0730     Aerobic Bacterial Culture No growth    Narrative:      LV Lead Tip    Aerobic culture [[id number]] Collected: 09/27/22 1035    Order Status: Completed Specimen: Abscess from Heart Updated: 09/28/22 0730     Aerobic Bacterial Culture No growth    Narrative:      RA Lead drainage #2    Aerobic culture [[id number]] Collected: 09/27/22 1138    Order Status: Completed Specimen: Abscess from Heart Updated: 09/28/22 0730     Aerobic Bacterial Culture No growth    Narrative:      RV Lead Tip    Culture, Anaerobe [[id number]]     Order Status: No result Specimen: Abscess from Heart     Aerobic culture [[id number]]     Order Status: No result Specimen: Abscess from Heart     Blood culture [[id number]] Collected: 09/18/22 1117    Order Status: Completed Specimen: Blood from Peripheral, Right Hand Updated: 09/23/22 1412     Blood Culture, Routine No growth after 5 days.    Blood culture [[id number]] Collected: 09/18/22 1105    Order Status: Completed Specimen: Blood from Antecubital, Right Arm Updated: 09/23/22 1212     Blood Culture, Routine No growth after 5 days.                Estimated Creatinine Clearance: 35.8 mL/min (A) (based on SCr of 2.1 mg/dL (H)).    Diagnostic Results:      Assessment and Plan:     73 yo WM being worked up for LVAD since hospitalization January 2022 for recurrent VT (he thinks had MI) and cardiogenic shock at [facility]. He was  later seen in [facility] where he was treated for cardiogenic shock and sent home on .  He remains on  and is pursuing evaluation for LVAD.     His case was discussed at selection committee and he was deferred pending evaluation of  pancreatic mass.  They wish to proceed with MRI but not sure with his ICD if this will be possible.     Meanwhile Presents with MRSA bacteremia at this admission.     HARRY 9/21/22: Not concerning for Vegetations. BCx negative 9/18     Post op Acute Hypoxic respiratory failure and Hypotension (POD #1 Device removal) in the setting of volume overload from periopertaive fluids.  Resolved, will step down to CSU.          * Chronic combined systolic and diastolic congestive heart failure  - On Dobutamine 2.5 .   - will hold entresto for now.    - Hold diuretics currently . Cr bump. Trial of 250 cc fluids.  - Daily weights, Strict I/Os  - Monitor electrolytes and Maintain Mg >2 and K >4  -Telemetry monitoring           Pancreatic lesion  - Gastroenterology consult and recommendations appreciated  - will either get an MRI ot CT contrast for lesion as per GI recommendations once patient euvolemic and stable.      Bacteremia due to methicillin resistant Staphylococcus aureus  - Monitor WBC count and fever curve, pan-culture if patient spikes  - ID consult and recommendations are appreciated  - Vancomycin 1,250, daily; Monitor level and renal panel  -patient has persistent positive blood cultures.  Cultures from 18 no growth so far.  His currently on vancomycin.  Id on board.  - HARRY 9/21 not concerning for any vegetations  -Device extraction done yesterday.POD #1  -will eventually need abx for 6 wks following lead removal 9/27/22.      H/O ventricular tachycardia  - Amiodarone 300 mg, daily  - Monitor LFTs     Coronary artery disease involving native coronary artery of native heart without angina pectoris  - Asprin 81 mg, daily  - Atorvastatin 80 mg, nightly     Left Brachial Vein Thrombosis 9/21  - Heparin on hold per EP recommendations POD #1 device removal.    Benton Rendon MD  Heart Transplant  [facility] - [facility]

## 2022-09-28 NOTE — PT/OT/SLP DISCHARGE
Physical Therapy Discharge Summary    Name: Audrey Oneil Jr.  MRN: [id number]   Principal Problem: Chronic combined systolic and diastolic congestive heart failure     Patient Discharged from acute Physical Therapy on 2022.       Assessment:     Patient transferred to lower level of care secondary to hypotension after electrode lead extraction.     Objective:     GOALS:   Multidisciplinary Problems       Physical Therapy Goals          Problem: Physical Therapy    Goal Priority Disciplines Outcome Goal Variances Interventions   Physical Therapy Goal     PT, PT/OT Ongoing, Progressing     Description: Goals to be met by: 10/5/22, revised: 10/10/22    Patient will increase functional independence with mobility by performin. Sit to stand transfer with independence and maintaining sternal precautions- not met  2. Gait  x 300 feet with independence using LRAD as needed- not met  3. Ascend/descend 5 stair with bilateral handrails modified independence and maintaining sternal precautions using LRAD as needed- not met  4. Lower extremity exercise program x15 reps per handout, with independence- not met                            Plan:     [facility]      [facility]

## 2022-09-29 NOTE — ANESTHESIA PROCEDURE NOTES
Ad Hoc Intubation    Date/Time: 9/29/2022 4:34 PM  Performed by: Zander Weinberg MD  Authorized by: Darius Orosco MD     Indications:  Respiratory distress, respiratory failure and hypoxemia  Diagnosis:  Acute hypoxic respiratory failure  Patient Location:  Floor  Procedure Start Time:  9/29/2022 4:33 PM  Procedure End Time:  9/29/2022 4:35 PM  Staff:     Other Anesthesia Staff:  Darius Orosco MD    Anesthesiologist Present: Yes    Intubation:     Induction:  Intravenous (Induced with 10mg Etomidate and 200mg Succinylcholine)    Intubated:  Postinduction    Mask Ventilation:  N/a    Attempts:  1    Attempted By:  Resident anesthesiologist    Method of Intubation:  Video laryngoscopy    Blade:  Adan 3    Laryngeal View Grade: Grade I - full view of chords      Difficult Airway Encountered?: No      Complications:  None    Airway Device:  Oral endotracheal tube    Airway Device Size:  7.5    Style/Cuff Inflation:  Cuffed    Inflation Amount (mL):  8    Tube secured:  26    Secured at:  The lips    Placement Verified By:  Colorimetric ETCO2 device    Complicating Factors:  None    Findings Post-Intubation:  BS equal bilateral and atraumatic/condition of teeth unchanged

## 2022-09-29 NOTE — RESPIRATORY THERAPY
Pt emergently intubated with a 7.5 ET tube, 26cm at the lips in room 303. Pt was ventilated with ambu bag during transport to ICU. Pt now in 3083 on a ventilator. Will continue to monitor.

## 2022-09-29 NOTE — SUBJECTIVE & OBJECTIVE
Interval History:     Underwent device extraction by EP 9/27. Pt step down to CSU today. On his baseline Dobutamine at 2.5. will get MRI abdomen to characterize the pancreatic lesion.   Continuous Infusions:   sodium chloride 0.9% Stopped (09/28/22 1550)    DOBUTamine IV infusion (non-titrating) 2.5 mcg/kg/min (09/29/22 0701)     Scheduled Meds:   acetaminophen  650 mg Oral QID    allopurinoL  200 mg Oral Daily    amiodarone  300 mg Oral Daily    aspirin  81 mg Oral Daily    atorvastatin  80 mg Oral Daily    LIDOcaine  1 patch Transdermal Q24H    polyethylene glycol  17 g Oral Daily    valsartan  40 mg Oral Daily    vancomycin (VANCOCIN) IVPB  1,000 mg Intravenous Once     PRN Meds:sodium chloride, acetaminophen, BUPivacaine (PF) 0.25% (2.5 mg/ml), dextromethorphan-guaiFENesin  mg, diphenhydrAMINE, fentaNYL, HYDROcodone-acetaminophen, HYDROmorphone, LIDOcaine HCL 10 mg/ml (1%), methocarbamoL, ondansetron, ondansetron, senna-docusate 8.6-50 mg, sodium chloride 0.9%, sodium chloride 0.9%, Pharmacy to dose Vancomycin consult **AND** vancomycin - pharmacy to dose, vancomycin    Review of patient's allergies indicates:   Allergen Reactions    Iodine containing multivitamin Swelling     itching    Keflex [cephalexin] Swelling     Eyes.  Tolerated multiple doses of zosyn and 1 dose of augmentin in 2015 and 2016, respectively    Peaches [peach (prunus persica)] Swelling     eyes    Shellfish containing products Swelling    Fig tree Swelling     itching    Tuberculin spenser test ppd Rash     Objective:     Vital Signs (Most Recent):  Temp: 98.9 °F (37.2 °C) (09/29/22 1108)  Pulse: 95 (09/29/22 1108)  Resp: 18 (09/29/22 1108)  BP: (!) 104/56 (09/29/22 1108)  SpO2: 95 % (09/29/22 1108)   Vital Signs (24h Range):  Temp:  [98.2 °F (36.8 °C)-98.9 °F (37.2 °C)] 98.9 °F (37.2 °C)  Pulse:  [] 95  Resp:  [13-35] 18  SpO2:  [93 %-98 %] 95 %  BP: (103-134)/(56-66) 104/56  Arterial Line BP: (107-161)/() 107/60      Patient Vitals for the past 72 hrs (Last 3 readings):   Weight   09/27/22 0804 94.3 kg (208 lb)   09/27/22 0500 94.8 kg (208 lb 15.9 oz)       Body mass index is 32.58 kg/m².           Telemetry: no events on tele    Physical Exam  Physical Exam  Constitutional:       Appearance: Normal appearance. He is not ill-appearing.   Cardiovascular:      Rate and Rhythm: Normal rate and regular rhythm.      Pulses: Normal pulses.      Heart sounds: Normal heart sounds.   Elevated JVD.  Pulmonary:      Effort: Pulmonary effort is normal. B/l crackles over basal region.  Abdominal:      General: Abdomen is flat. Bowel sounds are normal. There is distension.      Palpations: Abdomen is soft.      Tenderness: There is abdominal tenderness.   Musculoskeletal:         General: No swelling   1+ pitting edema in b/l LE upto knee. Mild tenderness around left upper arm, no fluctuance.  Skin:     General: Skin is warm and dry. Left chest covered by gauze ( postoperative from device extraction)     Neurological:      Mental Status: He is alert and oriented to person, place, and time. Mental status is at baseline.   Psychiatric:         Behavior: Behavior normal.         Thought Content: Thought content normal.       Significant Labs:  CBC:  Recent Labs   Lab 09/27/22  1406 09/28/22  0441 09/29/22  0355   WBC 16.33* 9.43 7.12   RBC 4.07* 3.70* 4.11*   HGB 11.2* 10.3* 11.4*   HCT 36.3* 32.8* 35.9*    177 SEE COMMENT   MCV 89 89 87   MCH 27.5 27.8 27.7   MCHC 30.9* 31.4* 31.8*       BNP:  No results for input(s): BNP in the last 168 hours.    Invalid input(s): BNPTRIAGELBLO  CMP:  Recent Labs   Lab 09/27/22  1406 09/28/22  0441 09/29/22  0355   * 144* 88   CALCIUM 8.3* 9.1 8.7   ALBUMIN 3.1* 3.5 3.3*   PROT 6.2 6.7 6.1   * 136 138   K 4.1 4.7 4.5   CO2 22* 22* 23    102 105   BUN 20 28* 26*   CREATININE 1.7* 2.1* 1.6*   ALKPHOS 82 81 72   ALT 23 22 16   AST 26 20 16   BILITOT 0.4 0.4 0.5        Coagulation:    Recent Labs   Lab 09/25/22  0427 09/25/22  1509 09/26/22  0128   APTT 35.6* 65.8* 49.8*       LDH:  No results for input(s): LDH in the last 72 hours.  Microbiology:  Microbiology Results (last 7 days)       Procedure Component Value Units Date/Time    Culture, Anaerobe [[id number]] Collected: 09/27/22 0947    Order Status: Completed Specimen: Abscess from Chest, Left Updated: 09/28/22 0738     Anaerobic Culture Culture in progress    Narrative:      Deep Pocket #1    Culture, Anaerobe [[id number]] Collected: 09/27/22 1035    Order Status: Completed Specimen: Abscess from Heart Updated: 09/28/22 0738     Anaerobic Culture Culture in progress    Narrative:      RA Lead drainage #2    Culture, Anaerobe [[id number]] Collected: 09/27/22 1133    Order Status: Completed Specimen: Abscess from Heart Updated: 09/28/22 0738     Anaerobic Culture Culture in progress    Narrative:      RA Lead Tip    Culture, Anaerobe [[id number]] Collected: 09/27/22 0952    Order Status: Completed Specimen: Abscess from Chest, Left Updated: 09/28/22 0738     Anaerobic Culture Culture in progress    Narrative:      Deep Pocket #2    Culture, Anaerobe [[id number]] Collected: 09/27/22 1026    Order Status: Completed Specimen: Abscess from Heart Updated: 09/28/22 0738     Anaerobic Culture Culture in progress    Narrative:      LV Lead Tip    Culture, Anaerobe [[id number]] Collected: 09/27/22 1032    Order Status: Completed Specimen: Abscess from Heart Updated: 09/28/22 0738     Anaerobic Culture Culture in progress    Narrative:      RA Lead drainage #1    Culture, Anaerobe [[id number]] Collected: 09/27/22 1138    Order Status: Completed Specimen: Abscess from Heart Updated: 09/28/22 0738     Anaerobic Culture Culture in progress    Narrative:      RV Lead Tip    Culture, Anaerobe [[id number]] Collected: 09/27/22 0936    Order Status: Completed Specimen: Abscess from Chest, Left Updated: 09/28/22 0738     Anaerobic Culture Culture in progress     Narrative:      Shallow Pocket    Aerobic culture [[id number]] Collected: 09/27/22 0947    Order Status: Completed Specimen: Abscess from Chest, Left Updated: 09/28/22 0730     Aerobic Bacterial Culture No growth    Narrative:      Deep Pocket #1    Aerobic culture [[id number]] Collected: 09/27/22 0952    Order Status: Completed Specimen: Abscess from Chest, Left Updated: 09/28/22 0730     Aerobic Bacterial Culture No growth    Narrative:      Deep Pocket #2    Aerobic culture [[id number]] Collected: 09/27/22 1032    Order Status: Completed Specimen: Abscess from Heart Updated: 09/28/22 0730     Aerobic Bacterial Culture No growth    Narrative:      RA Lead drainage #1    Aerobic culture [[id number]] Collected: 09/27/22 1133    Order Status: Completed Specimen: Abscess from Heart Updated: 09/28/22 0730     Aerobic Bacterial Culture No growth    Narrative:      RA Lead Tip    Aerobic culture [[id number]] Collected: 09/27/22 0936    Order Status: Completed Specimen: Abscess from Chest, Left Updated: 09/28/22 0730     Aerobic Bacterial Culture No growth    Narrative:      Shallow pocket    Aerobic culture [[id number]] Collected: 09/27/22 1026    Order Status: Completed Specimen: Abscess from Heart Updated: 09/28/22 0730     Aerobic Bacterial Culture No growth    Narrative:      LV Lead Tip    Aerobic culture [[id number]] Collected: 09/27/22 1035    Order Status: Completed Specimen: Abscess from Heart Updated: 09/28/22 0730     Aerobic Bacterial Culture No growth    Narrative:      RA Lead drainage #2    Aerobic culture [[id number]] Collected: 09/27/22 1138    Order Status: Completed Specimen: Abscess from Heart Updated: 09/28/22 0730     Aerobic Bacterial Culture No growth    Narrative:      RV Lead Tip    Culture, Anaerobe [[id number]]     Order Status: No result Specimen: Abscess from Heart     Aerobic culture [[id number]]     Order Status: No result Specimen: Abscess from Heart     Blood culture [[id number]] Collected:  09/18/22 1117    Order Status: Completed Specimen: Blood from Peripheral, Right Hand Updated: 09/23/22 1412     Blood Culture, Routine No growth after 5 days.    Blood culture [[id number]] Collected: 09/18/22 1105    Order Status: Completed Specimen: Blood from Antecubital, Right Arm Updated: 09/23/22 1212     Blood Culture, Routine No growth after 5 days.                Estimated Creatinine Clearance: 47 mL/min (A) (based on SCr of 1.6 mg/dL (H)).    Diagnostic Results:

## 2022-09-29 NOTE — PLAN OF CARE
Cardiac ICU Care Plan    POC reviewed with Audrey Oneil Jr. and family. Questions and concerns addressed. No acute events today. Pt progressing toward goals. Will continue to monitor. See below and flowsheets for full assessment and VS info.       Neuro:  [place] Coma Scale  Best Eye Response: 4-->(E4) spontaneous  Best Motor Response: 6-->(M6) obeys commands  Best Verbal Response: 5-->(V5) oriented  [place] Coma Scale Score: 15  Assessment Qualifiers: patient not sedated/intubated  Pupil PERRLA: yes    24 hr Temp:  [98.2 °F (36.8 °C)-98.9 °F (37.2 °C)]      CV:  Rhythm: normal sinus rhythm   DVT prophylaxis: VTE Required Core Measure: Pharmacological prophylaxis initiated/maintained    CVP (mean): 5 mmHg (09/29/22 0700)    [REMOVED] PICC Double Lumen 09/22/22 1507 right basilic-Current Insertion Depth (cm): 36 cm (09/22/22 1507)  SVO2 (%): 58 % (09/26/22 1020)               Pulses  Right Radial Pulse: 2+ (normal)  Left Radial Pulse: 2+ (normal)  Right Dorsalis Pedis Pulse: 2+ (normal)  Left Dorsalis Pedis Pulse: 2+ (normal)  Right Posterior Tibial Pulse: 2+ (normal)  Left Posterior Tibial Pulse: 2+ (normal)    Resp:  O2 Device (Oxygen Therapy): nasal cannula w/ humidification  Flow (L/min): 2  Oxygen Concentration (%): 2    GI/:  GI prophylaxis: no  Diet/Nutrition Received: low saturated fat/low cholesterol, 2 gram sodium, restrict fluids  Last Bowel Movement: 09/28/22  Voiding Characteristics: voids spontaneously without difficulty   Intake/Output Summary (Last 24 hours) at 9/29/2022 1159  Last data filed at 9/29/2022 1017  Gross per 24 hour   Intake 694.91 ml   Output 1175 ml   Net -480.09 ml        Nutritional Supplement Intake: Quantity 1, Type: Boost    Labs/Accuchecks:  Recent Labs   Lab 09/27/22  1406 09/28/22  0441 09/29/22  0355   WBC 16.33* 9.43 7.12   RBC 4.07* 3.70* 4.11*   HGB 11.2* 10.3* 11.4*   HCT 36.3* 32.8* 35.9*    177 SEE COMMENT      Recent Labs   Lab 09/25/22  0427 09/25/22  1369  09/26/22  0128   APTT 35.6* 65.8* 49.8*      Recent Labs     09/29/22  0355      K 4.5   CO2 23      BUN 26*   CREATININE 1.6*   ALKPHOS 72   ALT 16   AST 16   BILITOT 0.5     No results for input(s): CPK, CPKMB, MB, TROPONINI in the last 168 hours.   Recent Labs     09/27/22  1742 09/28/22  0453 09/28/22  2322   PH 7.330* 7.344* 7.332*   PCO2 46.4* 43.3 47.1*   PO2 34* 34* 38*   HCO3 24.5 23.6* 25.0   POCSATURATED 60* 61* 67*   BE -1 -2 -1       Electrolytes: N/A - electrolytes WDL  Accuchecks: none    Gtts/LDAs:   sodium chloride 0.9% Stopped (09/28/22 1550)    DOBUTamine IV infusion (non-titrating) 2.5 mcg/kg/min (09/29/22 0701)       Lines/Drains/Airways       Central Venous Catheter Line  Duration             Percutaneous Central Line Insertion/Assessment - Triple Lumen  09/27/22 0745 left internal jugular 2 days              Peripheral Intravenous Line  Duration                  Peripheral IV - Single Lumen 09/26/22 2000 20 G;1 3/4 in Left Forearm 2 days         Peripheral IV - Single Lumen 09/26/22 2000 20 G;1 3/4 in Right Forearm 2 days                    Skin/Wounds  Bathing/Skin Care: bath, complete;dressed/undressed (09/29/22 0405)  Wounds: Yes  Wound care consulted: No

## 2022-09-29 NOTE — PLAN OF CARE
Cardiac ICU Care Plan    POC reviewed with Audrey Oneil Jr. and family. Questions and concerns addressed. Pt progressing toward goals. Will continue to monitor. See below and flowsheets for full assessment and VS info.       Neuro:  [place] Coma Scale  Best Eye Response: 4-->(E4) spontaneous  Best Motor Response: 6-->(M6) obeys commands  Best Verbal Response: 5-->(V5) oriented  [place] Coma Scale Score: 15  Assessment Qualifiers: patient not sedated/intubated  Pupil PERRLA: yes    24 hr Temp:  [98.2 °F (36.8 °C)-98.3 °F (36.8 °C)]      CV:  Rhythm: normal sinus rhythm   DVT prophylaxis: VTE Required Core Measure: Pharmacological prophylaxis initiated/maintained    CVP (mean): (S) 3 mmHg (09/29/22 0405)    [REMOVED] PICC Double Lumen 09/22/22 1507 right basilic-Current Insertion Depth (cm): 36 cm (09/22/22 1507)  SVO2 (%): 58 % (09/26/22 1020)               Pulses  Right Radial Pulse: 2+ (normal)  Left Radial Pulse: 2+ (normal)  Right Dorsalis Pedis Pulse: 2+ (normal)  Left Dorsalis Pedis Pulse: 2+ (normal)  Right Posterior Tibial Pulse: 2+ (normal)  Left Posterior Tibial Pulse: 2+ (normal)    Resp:  O2 Device (Oxygen Therapy): nasal cannula w/ humidification  Flow (L/min): 2  Oxygen Concentration (%): 2    GI/:  GI prophylaxis: no  Diet/Nutrition Received: low saturated fat/low cholesterol, 2 gram sodium  Last Bowel Movement: 09/28/22  Voiding Characteristics: urethral catheter (bladder)       Urethral Catheter 09/27/22 0755 Double-lumen 16 Fr.-Reason for Continuing Urinary Catheterization: Post operative, Critically ill in ICU and requiring hourly monitoring of intake/output   Intake/Output Summary (Last 24 hours) at 9/29/2022 0754  Last data filed at 9/29/2022 0605  Gross per 24 hour   Intake 491.47 ml   Output 1235 ml   Net -743.53 ml        Nutritional Supplement Intake: none    Labs/Accuchecks:  Recent Labs   Lab 09/27/22  0712 09/27/22  1406 09/28/22  0441 09/29/22  0355   WBC 6.29 16.33* 9.43 7.12   RBC  4.13* 4.07* 3.70* 4.11*   HGB 11.4* 11.2* 10.3* 11.4*   HCT 35.5* 36.3* 32.8* 35.9*    343 177  --       Recent Labs   Lab 09/25/22  0427 09/25/22  1509 09/26/22  0128   APTT 35.6* 65.8* 49.8*      Recent Labs     09/29/22  0355      K 4.5   CO2 23      BUN 26*   CREATININE 1.6*   ALKPHOS 72   ALT 16   AST 16   BILITOT 0.5     No results for input(s): CPK, CPKMB, MB, TROPONINI in the last 168 hours.   Recent Labs     09/27/22  1742 09/28/22  0453 09/28/22  2322   PH 7.330* 7.344* 7.332*   PCO2 46.4* 43.3 47.1*   PO2 34* 34* 38*   HCO3 24.5 23.6* 25.0   POCSATURATED 60* 61* 67*   BE -1 -2 -1       Electrolytes: N/A - electrolytes WDL  Accuchecks: none    Gtts/LDAs:   sodium chloride 0.9% Stopped (09/28/22 1550)    DOBUTamine IV infusion (non-titrating) 2.5 mcg/kg/min (09/29/22 0605)       Lines/Drains/Airways       Central Venous Catheter Line  Duration             Percutaneous Central Line Insertion/Assessment - Triple Lumen  09/27/22 0745 left internal jugular 2 days              Drain  Duration                  Urethral Catheter 09/27/22 0755 Double-lumen 16 Fr. 1 day              Arterial Line  Duration             Arterial Line 09/27/22 0728 Left Radial 2 days              Peripheral Intravenous Line  Duration                  Peripheral IV - Single Lumen 09/26/22 2000 20 G;1 3/4 in Left Forearm 2 days         Peripheral IV - Single Lumen 09/26/22 2000 20 G;1 3/4 in Right Forearm 2 days                    Skin/Wounds  Bathing/Skin Care: bath, complete;dressed/undressed (09/29/22 0405)  Wounds: No  Wound care consulted: No

## 2022-09-29 NOTE — PROGRESS NOTES
Clinical Cardiac Electrophysiology Follow-Up Note     Date:  9/29/2022  Requesting attending:  Cherelle Hidalgo DO    Chief Complaint/Reason for Consultation:    Patient with MRSA bacteremia. EP consulted for device extraction. S/p extraction     Problem List:   1. MRSA bacteremia   2. 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 Teofilo Moore , and upgrade to a [employer] CRT-D in 5/2019 by Dr. Teofilo Moore. s/p extraction 9/27/22  3. H/O ventricular tachycardia on amiodarone 300 mg daily  4. LBBB  5. New DVT located in L brachial vein STARTED on heparin gtt   6. CAD s/p STEMI s/p PCI LAD 2007  7. provoked PE/DVT in 2011  8. Former smoker    24 Hr Events and Subjective:   NAEON.  Remains in NSR.    Off of Epi. Currently on  2.5      Scheduled Meds:       acetaminophen  650 mg Oral QID    allopurinoL  200 mg Oral Daily    amiodarone  300 mg Oral Daily    aspirin  81 mg Oral Daily    atorvastatin  80 mg Oral Daily    [START ON 9/30/2022] DAPTOmycin (CUBICIN) IV (PEDS and ADULTS)  10 mg/kg Intravenous Q24H    LIDOcaine  1 patch Transdermal Q24H    polyethylene glycol  17 g Oral Daily    valsartan  40 mg Oral Daily       Continuous Infusions:      sodium chloride 0.9% Stopped (09/28/22 1550)    DOBUTamine IV infusion (non-titrating) 2.5 mcg/kg/min (09/29/22 0701)         PRN Meds:   sodium chloride, acetaminophen, BUPivacaine (PF) 0.25% (2.5 mg/ml), dextromethorphan-guaiFENesin  mg, diphenhydrAMINE, fentaNYL, HYDROcodone-acetaminophen, HYDROmorphone, LIDOcaine HCL 10 mg/ml (1%), methocarbamoL, ondansetron, ondansetron, senna-docusate 8.6-50 mg, sodium chloride 0.9%, sodium chloride 0.9%, vancomycin    Allergies:     Review of patient's allergies indicates:   Allergen Reactions    Iodine containing multivitamin Swelling     itching    Keflex [cephalexin] Swelling     Eyes.  Tolerated multiple doses of zosyn and 1 dose of augmentin in 2015 and 2016,  respectively    Peaches [peach (prunus persica)] Swelling     eyes    Shellfish containing products Swelling    Fig tree Swelling     itching    Tuberculin spenser test ppd Rash       Physical Exam:     Vitals:    09/29/22 1325   BP:    Pulse:    Resp: 16   Temp:        Eyes: Sclerae anicteric. Ears/nose/throat: Mucous membranes moist. Neck: No thyromegaly, lymphadenopathy, or jugular venous distention. Respiratory: Lungs clear to auscultation bilaterally. Cardiovascular: Heart was regular with normal first and second heart sounds. No S3 or S4. GI: Soft, nontender, nondistended, with normal bowel sounds. Musculoskeletal: extremities without cyanosis, clubbing or pitting edema. Neurologic: Patient is alert and oriented x3 and there is no focal strength deficit. Skin: no rashes, cuts, sores. Psychiatric: normal affect. Hematologic: no abnormal bruising or bleeding.    Laboratory Data:      BUN/Cr/glu/ALT/AST/amyl/lip:  26/1.6/--/16/16/--/-- (09/29 0355)  WBC/Hgb/Hct/Plts:  7.12/11.4/35.9/SEE COMMENT (09/29 0355)     Protein Creatinine Ratios:    Creatinine, Urine   Date Value Ref Range Status   03/30/2022 101.0 23.0 - 375.0 mg/dL Final   11/08/2015 80.0 23.0 - 375.0 mg/dL Final     Comment:     The random urine reference ranges provided were established   for 24 hour urine collections.  No reference ranges exist for  random urine specimens.  Correlate clinically.     01/19/2011 40 23 - 375 mg/dl Final     Protein, Urine Random   Date Value Ref Range Status   11/08/2015 20 (H) 0 - 15 mg/dL Final     Comment:     The random urine reference ranges provided were established   for 24 hour urine collections.  No reference ranges exist for  random urine specimens.  Correlate clinically.       Prot/Creat Ratio, Urine   Date Value Ref Range Status   11/08/2015 0.25 (H) 0.00 - 0.20 Final       Plan:    Post Device Extraction:     1. Cont Antibiotics per ID recommendation  2 No lifting over 5 pounds  3. Follow up in device clinic  in 1 week for device and wound checks.  4. Pt to be discharge with LifeVest  5. Follow up with EP clinic in 6-8 weeks.   6. Can resume AC 72 hrs post extraction for his tx of DVT      EP will sign off    Pt seen, examined and discussed with the attending physician, Dr. Berg of the EP consult service.   Current plan of care discussed with the patient, all questions answered.   Current plan of care discussed with primary service.    Farnosh Shariati [facility]  PGY4 Cardiology Fellow

## 2022-09-29 NOTE — NURSING
RN noticed  not running per MD order. Notified Samuel Singh MD. Orders to program the  gtt to match the current order and check scvo2 in an hour. WCTM.

## 2022-09-29 NOTE — PLAN OF CARE
Notified by primary team of abx recs - given variable renal function and vancomycin dosing, would stop vancomycin and switch to daptomycin 10mg/kg IV daily with weekly CK.     Outpatient Antibiotic Therapy Plan:    Please send referral to [facility].    1) Infection: MRSA bacteremia/IE    2) Discharge Antibiotics:    Intravenous antibiotics:  Daptomycin 10mg/kg IV daily        3) Therapy Duration:  6w from ICD removal    Estimated end date of IV antibiotics: 11/7/22    4) Outpatient Weekly Labs:    Order the following labs to be drawn on Mondays:   CBC  CMP   CPK (when on Daptomycin)        5) Fax Lab Results to Infectious Diseases Provider: Chayo    [facility] Fax Number: [phone]    6) Outpatient Infectious Diseases Follow-up    Follow-up appointment will be arranged by the ID clinic and will be found in the patient's appointments tab.    Prior to discharge, please ensure the patient's follow-up has been scheduled.    If there is still no follow-up scheduled prior to discharge, please send an EPIC message to Rufina Miles in Infectious Diseases.

## 2022-09-29 NOTE — PLAN OF CARE
[facility]   Heart Transplant Clinic  [address] [place] [address]   [phone] after hours        HOME  HEALTH ORDERS      Admit to Home Health    Diagnosis:   Patient Active Problem List   Diagnosis    Coronary artery disease involving native coronary artery of native heart without angina pectoris    Chronic combined systolic and diastolic heart failure    Obesity (BMI 30.0-34.9)    Cardiomyopathy, ischemic    Hx pulmonary embolism 2010 provoked dvt     Postural dizziness with presyncope    History of DVT (deep vein thrombosis)    Ventricular tachycardia    Hypertensive cardiovascular-renal disease, stage 1-4 or unspecified chronic kidney disease, with heart failure    Presence of cardiac resynchronization therapy defibrillator (CRT-D)    Mixed hyperlipidemia    Long term current use of amiodarone    Thoracic aortic atherosclerosis    Stage 3a chronic kidney disease    Prediabetes    LBBB (left bundle branch block)    Elevated troponin I level    Gout    Chronic combined systolic and diastolic congestive heart failure    H/O ventricular tachycardia    Hypoxia    Vitamin D deficiency    Blue skin    MRSA infection    Rotator cuff syndrome    Severe sepsis    Pressure injury of heel, stage 2    Dermatitis associated with moisture    Skin breakdown    Leukocytosis    Bacteremia due to methicillin resistant Staphylococcus aureus    MRSA bacteremia    Pancreatic lesion    Cellulitis of left forearm    Acute thrombosis of left brachial vein    Shock, unspecified    SSS (sick sinus syndrome)    Ischemic cardiomyopathy       Patient is homebound due to:   Diet: Low Sodium  Acitivities: As Tolerated    Nursing:   SN to complete comprehensive assessment including routine vital signs. Instruct on disease process and s/s of complications to report to MD. Review/verify medication list sent home with the patient at time of discharge  and instruct patient/caregiver as  needed. Frequency may be adjusted depending on start of care date.    Notify MD if SBP > 160 or < 90; DBP > 90 or < 50; HR > 120 or < 50; Temp > 101; Weight gain >3lbs in 1 day or 5lbs in 1 week.    LABS:  SN to perform labs: Weekly CBC, CMP, CRP, Mag (draw on Monday)    order the following additional labs to be drawn on Thursdays:  CMP   Vancomycin trough. Target 15-20     5) Fax Lab Results to Infectious Diseases Provider:  Dr. Trinity Aggarwal     [facility] Fax Number: [phone]    HOME INFUSION THERAPY:   SN to perform Infusion Therapy/Central Line Care.  Review Central Line Care & Central Line Flush with patient.    Administer (drug and dose):     Dobutamine 2.5 mcg/kg/min continuous IV infusion. Dosing weight 98 kg.     Vancomycin - pharm to dose (Trough target 15-20)    End of antibiotics:  11/11/22    **For questions or concerns, please call [phone] and ask for Pre-Heart transplant clinic, M-F 8-5. After hours, weekends, call [phone] and ask for the Heart Transplant Cardiologist on call.**    Central line care:  Scrub the Hub: Prior to accessing the line, always perform a 30 second alcohol scrub  Each lumen of the central line is to be flushed at least daily with 10 mL Normal Saline and 3 mL Heparin flush (100 units/mL)  Skilled Nurse (SN) may draw blood from IV access  Blood Draw Procedure:   - Aspirate at least 5 mL of blood   - Discard   - Obtain specimen   - Change posiflow cap   - Flush with 20 mL Normal Saline followed by a                 3-5 mL Heparin flush (100 units/mL)  Central :   - Sterile dressing changes are done weekly and as needed.   - Use chlor-hexadine scrub to cleanse site, apply Biopatch to insertion site,       apply securement device dressing   - Posi-flow caps are changed weekly and after EVERY lab draw.   - If sterile gauze is under dressing to control oozing,                 dressing change must be performed every 24 hours until gauze is not  needed.    CONSULTS:      Physical Therapy to evaluate and treat. Evaluate for home safety and equipment needs; Establish/upgrade home exercise program. Perform / instruct on therapeutic exercises, gait training, transfer training, and Range of Motion.    Occupational Therapy to evaluate and treat. Evaluate home environment for safety and equipment needs. Perform/Instruct on transfers, ADL training, ROM, and therapeutic exercises.      Send initial Home Health orders to HTS attending physician on call.  Send follow up questions to [phone] or fax:                Heart Failure:      [phone]

## 2022-09-29 NOTE — PROCEDURES
"Audrey Oneil Jr. is a 72 y.o. male patient.    Temp: 98.9 °F (37.2 °C) (09/29/22 1108)  Pulse: 95 (09/29/22 1108)  Resp: 16 (09/29/22 1325)  BP: (!) 104/56 (09/29/22 1108)  SpO2: 95 % (09/29/22 1108)  Weight: 94.3 kg (208 lb) (09/27/22 0804)  Height: 5' 7" (170.2 cm) (09/27/22 0804)       Arterial Line    Date/Time: 9/29/2022 5:52 PM  Location procedure was performed: [facility] CARDIAC MEDICAL ICU ([facility])  Performed by: Kevin Gillespie MD  Authorized by: Kevni Gillespie MD   Pre-op Diagnosis: Cardiogenic shock  Post-operative diagnosis: Cardiogenic shock  Consent Done: Emergent Situation  Preparation: Patient was prepped and draped in the usual sterile fashion.  Indications: multiple ABGs, respiratory failure and hemodynamic monitoring  Location: left radial    Anesthesia:  Anesthetic total: 0 mL    Patient sedated: yes  Sedation type: anxiolysis    Nikolas's test normal: yes  Needle gauge: 20  Seldinger technique: Seldinger technique used  Number of attempts: 1  Complications: No  Estimated blood loss (mL): 1  Post-procedure: line sutured and dressing applied  Post-procedure CMS: normal  Patient tolerance: Patient tolerated the procedure well with no immediate complications    Please call with questions or concerns.     Kevin Gillespie MD  Cardiology PGY5  [facility]  9/29/2022    "

## 2022-09-29 NOTE — PROCEDURES
"Audrey Oneil Jr. is a 72 y.o. male patient.    Temp: 98.9 °F (37.2 °C) (09/29/22 1108)  Pulse: 95 (09/29/22 1108)  Resp: 16 (09/29/22 1325)  BP: (!) 104/56 (09/29/22 1108)  SpO2: 95 % (09/29/22 1108)  Weight: 94.3 kg (208 lb) (09/27/22 0804)  Height: 5' 7" (170.2 cm) (09/27/22 0804)    PICC  Date/Time: 9/29/2022 4:34 PM  Performed by: Michael Lagos RN  Assisting provider: Marisa Mccall RN  Consent Done: Yes  Time out: Immediately prior to procedure a time out was called to verify the correct patient, procedure, equipment, support staff and site/side marked as required  Indications: med administration and vascular access  Anesthesia: local infiltration  Local anesthetic: lidocaine 1% without epinephrine  Anesthetic Total (mL): 3  Preparation: skin prepped with ChloraPrep  Skin prep agent dried: skin prep agent completely dried prior to procedure  Sterile barriers: all five maximum sterile barriers used - cap, mask, sterile gown, sterile gloves, and large sterile sheet  Hand hygiene: hand hygiene performed prior to central venous catheter insertion  Location details: right brachial  Catheter type: double lumen  Catheter size: 5 Fr  Catheter Length: 36cm    Ultrasound guidance: yes  Vessel Caliber: large and patent, compressibility normal  Vascular Doppler: not done  Needle advanced into vessel with real time Ultrasound guidance.  Guidewire confirmed in vessel.  Image recorded and saved.  Sterile sheath used.  Number of attempts: 1  Technical procedures used: 3CG  Specimens: No  Implants: No  Complications: other  Other complications: PICC WAS NOT ABLE TO BE CENTRALLY PLACED, PATIENT BECAME ANXIOUS AND WAS UNABLE TO TOLERATE TROUBLESHOOTING PICC FOR PLACEMENT  Comments: Patient became anxious and O2 applied via nc, patient complained of feeling like he couldn't breathe and was having a heart attack, life vest on and properly functioning, nurse called for management of symptoms, wife at bedside        Name " LIS Mccall RN  9/29/2022

## 2022-09-29 NOTE — PROGRESS NOTES
Update & Dispo Planning     Pt presents as AAO x4, calm, cooperative, and asking and answering questions appropriately, Pt's caregivers at bedside. Pt states he is doing well and sitting up in his bed watching TV. Per HTS rounds today, Pt will dispo over the weekend/Monday with  and IV AVB. \[facility]\""W submitted orders to [employer] (p:[phone], f: [phone]) for approval. Pt already utilizing [facility] for  with access to the AIC ([phone]; fax [phone]), [facility] updated [place].     Pt states his primary address is [address] [place] [id number] but often stays at his SO's place   [address] [facility] [place], [place].       SW providing ongoing psychosocial, counseling, & emotional support, education, resources, assistance, and discharge planning as indicated.  SW to continue to follow.

## 2022-09-29 NOTE — PROGRESS NOTES
Pharmacokinetic Assessment Follow Up: IV Vancomycin     Vancomycin random resulted today at 15.3.   Plan to administer vancomycin 1gm iv x1.   Renal function improving with decreased SCr from 2.1 to 1.6.   Follow up AM random levels.   Redose for random level <20     Thank you for the consult,   Alexandria Barnes, PharmD, [facility]  Heart Transplant Clinical Specialist   Spectralink: [id number]    Drug levels (last 3 results):  Recent Labs   Lab Result Units 09/27/22  0516 09/28/22  1116 09/29/22  0355   Vancomycin, Random ug/mL 17.5 16.2 15.3        Patient brief summary:  Audrey Oneil Jr. is a 72 y.o. male initiated on antimicrobial therapy with IV Vancomycin for treatment of bacteremia    Drug Allergies:   Review of patient's allergies indicates:   Allergen Reactions    Iodine containing multivitamin Swelling     itching    Keflex [cephalexin] Swelling     Eyes.  Tolerated multiple doses of zosyn and 1 dose of augmentin in 2015 and 2016, respectively    Peaches [peach (prunus persica)] Swelling     eyes    Shellfish containing products Swelling    Fig tree Swelling     itching    Tuberculin spenser test ppd Rash       Actual Body Weight:   94.3kg    Renal Function:   Estimated Creatinine Clearance: 47 mL/min (A) (based on SCr of 1.6 mg/dL (H)).,     Dialysis Method (if applicable):  N/A    CBC (last 72 hours):  Recent Labs   Lab Result Units 09/27/22  0712 09/27/22  1406 09/28/22  0441 09/29/22  0355   WBC K/uL 6.29 16.33* 9.43 7.12   Hemoglobin g/dL 11.4* 11.2* 10.3* 11.4*   Hematocrit % 35.5* 36.3* 32.8* 35.9*   Platelets K/uL 368 343 177 SEE COMMENT   Gran % % 62.8 92.9* 86.8* 72.6   Lymph % % 27.0 4.6* 7.4* 18.3   Mono % % 7.2 1.7* 5.2 6.6   Eosinophil % % 1.6 0.1 0.0 1.4   Basophil % % 0.6 0.2 0.1 0.4   Differential Method  Automated Automated Automated Automated       Metabolic Panel (last 72 hours):  Recent Labs   Lab Result Units 09/27/22  0712 09/27/22  1406 09/28/22  0441 09/29/22  0355   Sodium mmol/L 137 135* 136  138   Potassium mmol/L 4.1 4.1 4.7 4.5   Chloride mmol/L 104 104 102 105   CO2 mmol/L 22* 22* 22* 23   Glucose mg/dL 102 156* 144* 88   BUN mg/dL 21 20 28* 26*   Creatinine mg/dL 1.6* 1.7* 2.1* 1.6*   Albumin g/dL 3.4* 3.1* 3.5 3.3*   Total Bilirubin mg/dL 0.4 0.4 0.4 0.5   Alkaline Phosphatase U/L 86 82 81 72   AST U/L 20 26 20 16   ALT U/L 20 23 22 16   Magnesium mg/dL  --  1.9  --   --        Vancomycin Administrations:  vancomycin given in the last 96 hours                     vancomycin 500 mg in dextrose 5 % 100 mL IVPB (ready to mix system) (mg) 500 mg New Bag 09/28/22 1551    vancomycin (VANCOCIN) 1,000 mg in dextrose 5 % 250 mL IVPB (mg) 1,000 mg New Bag 09/27/22 0900    vancomycin injection (mg) 1,000 mg Given 09/27/22 0800    vancomycin 750 mg in dextrose 5 % 250 mL IVPB (ready to mix system) (mg) 750 mg New Bag 09/26/22 1115                    Microbiologic Results:  Microbiology Results (last 7 days)       Procedure Component Value Units Date/Time    Culture, Anaerobe [[id number]] Collected: 09/27/22 0947    Order Status: Completed Specimen: Abscess from Chest, Left Updated: 09/28/22 0738     Anaerobic Culture Culture in progress    Narrative:      Deep Pocket #1    Culture, Anaerobe [[id number]] Collected: 09/27/22 1035    Order Status: Completed Specimen: Abscess from Heart Updated: 09/28/22 0738     Anaerobic Culture Culture in progress    Narrative:      RA Lead drainage #2    Culture, Anaerobe [[id number]] Collected: 09/27/22 1133    Order Status: Completed Specimen: Abscess from Heart Updated: 09/28/22 0738     Anaerobic Culture Culture in progress    Narrative:      RA Lead Tip    Culture, Anaerobe [[id number]] Collected: 09/27/22 0952    Order Status: Completed Specimen: Abscess from Chest, Left Updated: 09/28/22 0738     Anaerobic Culture Culture in progress    Narrative:      Deep Pocket #2    Culture, Anaerobe [[id number]] Collected: 09/27/22 1026    Order Status: Completed Specimen: Abscess from  Heart Updated: 09/28/22 0738     Anaerobic Culture Culture in progress    Narrative:      LV Lead Tip    Culture, Anaerobe [[id number]] Collected: 09/27/22 1032    Order Status: Completed Specimen: Abscess from Heart Updated: 09/28/22 0738     Anaerobic Culture Culture in progress    Narrative:      RA Lead drainage #1    Culture, Anaerobe [[id number]] Collected: 09/27/22 1138    Order Status: Completed Specimen: Abscess from Heart Updated: 09/28/22 0738     Anaerobic Culture Culture in progress    Narrative:      RV Lead Tip    Culture, Anaerobe [[id number]] Collected: 09/27/22 0936    Order Status: Completed Specimen: Abscess from Chest, Left Updated: 09/28/22 0738     Anaerobic Culture Culture in progress    Narrative:      Shallow Pocket    Aerobic culture [[id number]] Collected: 09/27/22 0947    Order Status: Completed Specimen: Abscess from Chest, Left Updated: 09/28/22 0730     Aerobic Bacterial Culture No growth    Narrative:      Deep Pocket #1    Aerobic culture [[id number]] Collected: 09/27/22 0952    Order Status: Completed Specimen: Abscess from Chest, Left Updated: 09/28/22 0730     Aerobic Bacterial Culture No growth    Narrative:      Deep Pocket #2    Aerobic culture [[id number]] Collected: 09/27/22 1032    Order Status: Completed Specimen: Abscess from Heart Updated: 09/28/22 0730     Aerobic Bacterial Culture No growth    Narrative:      RA Lead drainage #1    Aerobic culture [[id number]] Collected: 09/27/22 1133    Order Status: Completed Specimen: Abscess from Heart Updated: 09/28/22 0730     Aerobic Bacterial Culture No growth    Narrative:      RA Lead Tip    Aerobic culture [[id number]] Collected: 09/27/22 0936    Order Status: Completed Specimen: Abscess from Chest, Left Updated: 09/28/22 0730     Aerobic Bacterial Culture No growth    Narrative:      Shallow pocket    Aerobic culture [[id number]] Collected: 09/27/22 1026    Order Status: Completed Specimen: Abscess from Heart Updated: 09/28/22 0730      Aerobic Bacterial Culture No growth    Narrative:      LV Lead Tip    Aerobic culture [[id number]] Collected: 09/27/22 1035    Order Status: Completed Specimen: Abscess from Heart Updated: 09/28/22 0730     Aerobic Bacterial Culture No growth    Narrative:      RA Lead drainage #2    Aerobic culture [[id number]] Collected: 09/27/22 1138    Order Status: Completed Specimen: Abscess from Heart Updated: 09/28/22 0730     Aerobic Bacterial Culture No growth    Narrative:      RV Lead Tip    Culture, Anaerobe [[id number]]     Order Status: No result Specimen: Abscess from Heart     Aerobic culture [[id number]]     Order Status: No result Specimen: Abscess from Heart     Blood culture [[id number]] Collected: 09/18/22 1117    Order Status: Completed Specimen: Blood from Peripheral, Right Hand Updated: 09/23/22 1412     Blood Culture, Routine No growth after 5 days.    Blood culture [[id number]] Collected: 09/18/22 1105    Order Status: Completed Specimen: Blood from Antecubital, Right Arm Updated: 09/23/22 1212     Blood Culture, Routine No growth after 5 days.

## 2022-09-29 NOTE — NURSING
Pt family member called primary RN to bedside. PICC team attempting to place line and was unsuccessful. Pt in tripod position in bed complaining of dyspnea. O2 sats 81% pt placed on NRB. Pt could not tolerate NRB and kept taking it off. O2 sat dropped to low 70s. Unable to read a BP. Charge RN at bedside. Rapid response team and primary team notified at 1629 and came to bedside. Pt continued having difficulty breathing and per MD order was intubated at 1647. Pt transferred to ICU for higher level of care.

## 2022-09-29 NOTE — RESPIRATORY THERAPY
Patient arrived from CSU, hand off from Messi Raj RRT. Placed on ventilator on documented settings, will continue to monitor

## 2022-09-29 NOTE — SIGNIFICANT EVENT
RAPID RESPONSE RESPIRATORY THERAPY NOTE             Code Status: Full Code   : 1950  Bed: [id number] A:   MRN: [id number]  Time page Received: 1630  Time Rapid Response RT at Bedside: 1632  Time Rapid Response RT left Bedside: 1715    SITUATION    Evaluated patient for: Respiratory distress/Increased O2 requirements    BACKGROUND    Why is the patient in the hospital?: Chronic combined systolic and diastolic congestive heart failure    Patient has a past medical history of Acute hypoxemic respiratory failure, Acute idiopathic gout of left knee, Acute idiopathic gout of right elbow, AICD (automatic cardioverter/defibrillator) present, Anticoagulant long-term use, Bronchopneumonia, CHF (congestive heart failure), Chronic anticoagulation, Chronic combined systolic and diastolic heart failure, Chronic gout, Clotting disorder, Coronary artery disease involving native coronary artery of native heart without angina pectoris, COVID-19, Diverticulosis of colon, DVT (deep venous thrombosis), unspecified laterality, Dysphonia, Essential hypertension, Fine motor impairment, Hyperlipidemia, Hypertensive heart disease with heart failure, MI (myocardial infarction), Nicotine abuse, Obesity, Olecranon bursitis of right elbow, Pulmonary embolism, Renal disorder, Right carpal tunnel syndrome, Stented coronary artery, and Trigger thumb of right hand.    24 Hours Vitals Range:  Temp:  [98.3 °F (36.8 °C)-98.9 °F (37.2 °C)]   Pulse:  []   Resp:  [14-35]   BP: (103-134)/(56-66)   SpO2:  [91 %-98 %]   Arterial Line BP: (107-161)/()     Labs:    Recent Labs     22  1406 22  0441 22  0355   * 136 138   K 4.1 4.7 4.5    102 105   CO2 22* 22* 23   CREATININE 1.7* 2.1* 1.6*   * 144* 88   MG 1.9  --   --         Recent Labs     22  1742 22  0453 22  2322   PH 7.330* 7.344* 7.332*   PCO2 46.4* 43.3 47.1*   PO2 34* 34* 38*   HCO3 24.5 23.6* 25.0   POCSATURATED 60* 61*  67*   BE -1 -2 -1       ASSESSMENT/INTERVENTIONS  Upon arrival to room pt was struggling, sitting in bed being attended to by staff, who stated his issues began after an attempted peg tube placement. Pt was on a NRB, as well as a NC, tachypneic, with sats in the 80's. Decision was made to emergently intubate pt. Pt was then transported to ICU bed 3083, which I assisted with. Pt transported while ventilated with an ambu bag. Pt now in 3083 on a ventilator.    Last Vitals: Temp: 98.9 °F (37.2 °C) (09/29 1108)  Pulse: 95 (09/29 1108)  Resp: 26 (09/29 1803)  BP: 104/56 (09/29 1108)  SpO2: 95 % (09/29 1108)  Arterial Line BP: 107/60 (09/29 0900)  Level of Consciousness: Level of Consciousness (AVPU): unresponsive  Respiratory Effort: Respiratory Effort: Unlabored, Normal  Expansion/Accessory Muscle Usage: Expansion/Accessory Muscles/Retractions: expansion symmetric  All Lung Field Breath Sounds: All Lung Fields Breath Sounds: Anterior:, Lateral:, coarse, diminished, equal bilaterally  LUCHO Breath Sounds: clear  LLL Breath Sounds: clear  RUL Breath Sounds: clear  RML Breath Sounds: clear  RLL Breath Sounds: clear  O2 Device/Concentration:   NIPPV: No Surgical airway:  ET tube (7.5)  Vent: Yes, Settings: ambu bag  ETCO2 monitored:    Ambu at bedside: Ambu bag with the patient?: Yes, Adult Ambu    Active Orders   Respiratory Care    Mechanical ventilation Continuous     Frequency: Continuous     Number of Occurrences: Until Specified     Order Questions:      Vent Type: VC      Vent Mode: A/C      Rate 24      Tidal Volume 470      PEEP 10      FIO2 100      I-Time: 0.86    Oxygen Continuous     Frequency: Continuous     Number of Occurrences: Until Specified     Order Comments: Discontinue when Sp02 is greater than or equal to 95% of equal to Preop Sp02       Order Questions:      Device type: Low flow      Device: Simple Face Mask      Titrate O2 per Oxygen Titration Protocol: Yes      Notify MD of: Inability to achieve  desired SpO2    Oxygen PRN     Frequency: PRN     Number of Occurrences: Until Specified     Order Questions:      Device type: Low flow      Device: Nasal Cannula (1- 5 Liters)      LPM: 5      Titrate O2 per Oxygen Titration Protocol: Yes      To maintain SpO2 goal of: >= 90%      Notify MD of: Inability to achieve desired SpO2; Sudden change in patient status and requires 20% increase in FiO2; Patient requires >60% FiO2    Pulse Oximetry Continuous     Frequency: Continuous     Number of Occurrences: Until Specified       RECOMMENDATIONS  ?  We recommend: RRT Recs: Continue POC per primary team.    ESCALATION    Orders received and case discussed with Dr. Hidalgo and POC reviewed with bedside RTElizabeth.    FOLLOW-UP    Disposition: Tx to ER bed 308.    Please call back the Rapid Response RTMessi RRT at x [phone] for any questions or concerns.

## 2022-09-29 NOTE — CODE/ RAPID DOCUMENTATION
RAPID RESPONSE NURSE NOTE        Admit Date: 2022  LOS: 15  Code Status: Full Code   Date of Consult: 2022  : 1950  Age: 72 y.o.  Weight:   Wt Readings from Last 1 Encounters:   22 94.3 kg (208 lb)     Sex: male  Race: White   Bed: [place] [address] A:   MRN: [id number]  Time Rapid Response Team page Received: 1629  Time Rapid Response Team at Bedside: 1630  Time Rapid Response Team left Bedside: 1710  Was the patient discharged from an ICU this admission? Yes   Was the patient discharged from a PACU within last 24 hours? No   Did the patient receive conscious sedation/general anesthesia in last 24 hours? No  Was the patient in the ED within the past 24 hours? No  Was the patient on NIPPV within the past 24 hours? No   Did this progress into an ARC or CPA: yes  Attending Physician: Cherelle Hidalgo DO  Primary Service: [facility] HEART TRANSPLANT TEAM 1       SITUATION    Notified by pager.  Reason for alert: SOB  Called to evaluate the patient for Respiratory    BACKGROUND     Why is the patient in the hospital?: Chronic combined systolic and diastolic congestive heart failure    Patient has a past medical history of Acute hypoxemic respiratory failure, Acute idiopathic gout of left knee, Acute idiopathic gout of right elbow, AICD (automatic cardioverter/defibrillator) present, Anticoagulant long-term use, Bronchopneumonia, CHF (congestive heart failure), Chronic anticoagulation, Chronic combined systolic and diastolic heart failure, Chronic gout, Clotting disorder, Coronary artery disease involving native coronary artery of native heart without angina pectoris, COVID-19, Diverticulosis of colon, DVT (deep venous thrombosis), unspecified laterality, Dysphonia, Essential hypertension, Fine motor impairment, Hyperlipidemia, Hypertensive heart disease with heart failure, MI (myocardial infarction), Nicotine abuse, Obesity, Olecranon bursitis of right elbow, Pulmonary embolism, Renal disorder, Right  carpal tunnel syndrome, Stented coronary artery, and Trigger thumb of right hand.    Last Vitals:  Temp: 98.9 °F (37.2 °C) (09/29 1108)  Pulse: 95 (09/29 1108)  Resp: 16 (09/29 1325)  BP: 104/56 (09/29 1108)  SpO2: 95 % (09/29 1108)  Arterial Line BP: 107/60 (09/29 0900)    24 Hours Vitals Range:  Temp:  [98.3 °F (36.8 °C)-98.9 °F (37.2 °C)]   Pulse:  []   Resp:  [14-35]   BP: (103-134)/(56-66)   SpO2:  [91 %-98 %]   Arterial Line BP: (107-161)/()     Labs:  Recent Labs     09/27/22  1406 09/28/22  0441 09/29/22  0355   WBC 16.33* 9.43 7.12   HGB 11.2* 10.3* 11.4*   HCT 36.3* 32.8* 35.9*    177 SEE COMMENT       Recent Labs     09/27/22  1406 09/28/22  0441 09/29/22  0355   * 136 138   K 4.1 4.7 4.5    102 105   CO2 22* 22* 23   CREATININE 1.7* 2.1* 1.6*   * 144* 88   MG 1.9  --   --         Recent Labs     09/27/22  1742 09/28/22  0453 09/28/22  2322   PH 7.330* 7.344* 7.332*   PCO2 46.4* 43.3 47.1*   PO2 34* 34* 38*   HCO3 24.5 23.6* 25.0   POCSATURATED 60* 61* 67*   BE -1 -2 -1        ASSESSMENT    Physical Exam  Vitals reviewed.   Constitutional:       General: He is in acute distress.      Appearance: He is ill-appearing and diaphoretic.   Cardiovascular:      Rate and Rhythm: Rhythm irregular.   Pulmonary:      Effort: Respiratory distress present.     Arrived to patients room he is sitting on edge of bed, extremely anxious, diaphoretic, pale/cyanotic. Currently with sats 80% on non-rebreather; pink frothy sputum noted. Unable to obtain BP initially. Dr. Hidalgo to bedside. Patient placed on zoll pads; call placed to anesthesia. Patient currently satting 73% on non-rebreather. Patient was intubated with color change and bilateral breath sounds noted. Doppler BP obtained 68/0. Patient was then transferred to room 3083 with RRN, RRT, and MD at bedside.     INTERVENTIONS    The patient was seen for Respiratory problem. Staff concerns included new onset of difficulty  breathing, oxygen saturation < 90% despite supplemental oxygen, increased WOB, and increased oxygen requirements. The following interventions were performed: supplemental oxygen, portable chest x-ray, inpatient consult to anesthesiology, and intubation and transfer to critical care.    RECOMMENDATIONS    We recommend:     - Intubation  - Transfer to Critical Care    PROVIDER ESCALATION    Orders received and case discussed with Dr. Hidalgo .    Primary team arrival time: 1635    Disposition: Tx in ICU bed 3083.    FOLLOW UP    Bedside Johnna TALAMANTES  updated on plan of care. Instructed to call the Rapid Response Nurse, Ольга Sequeira RN at [phone] for additional questions or concerns.

## 2022-09-29 NOTE — CONSULTS
NAJMA consulted for PICC for home inotropes    Attempted PICC placement in JULIETH    Was unable to centrally place PICC    Patient became anxious and started to hyperventilate, couldn't stay in position for PICC placement, wanted oxygen, stated he was having a heart attack and couldn't breathe    Was not able to fully troubleshoot PICC, removed and held pressure    O2 via nc applied to patient, HOB raised, nurse called and arrived to manage patient's symptoms, wife at bedside

## 2022-09-29 NOTE — PROGRESS NOTES
[facility] - Cardiology Stepdown  Heart Transplant  Progress Note    Patient Name: Audrey Oneil Jr.  MRN: [id number]  Admission Date: 9/14/2022  Hospital Length of Stay: 15 days  Attending Physician: Cherelle Hidalgo DO  Primary Care Provider: Primary Doctor No  Principal Problem:Chronic combined systolic and diastolic congestive heart failure    Subjective:     Interval History:     Underwent device extraction by EP 9/27. Pt step down to CSU today. On his baseline Dobutamine at 2.5. will get MRI abdomen to characterize the pancreatic lesion for LVAD eval.  Continuous Infusions:   sodium chloride 0.9% Stopped (09/28/22 1550)    DOBUTamine IV infusion (non-titrating) 2.5 mcg/kg/min (09/29/22 0701)     Scheduled Meds:   acetaminophen  650 mg Oral QID    allopurinoL  200 mg Oral Daily    amiodarone  300 mg Oral Daily    aspirin  81 mg Oral Daily    atorvastatin  80 mg Oral Daily    LIDOcaine  1 patch Transdermal Q24H    polyethylene glycol  17 g Oral Daily    valsartan  40 mg Oral Daily    vancomycin (VANCOCIN) IVPB  1,000 mg Intravenous Once     PRN Meds:sodium chloride, acetaminophen, BUPivacaine (PF) 0.25% (2.5 mg/ml), dextromethorphan-guaiFENesin  mg, diphenhydrAMINE, fentaNYL, HYDROcodone-acetaminophen, HYDROmorphone, LIDOcaine HCL 10 mg/ml (1%), methocarbamoL, ondansetron, ondansetron, senna-docusate 8.6-50 mg, sodium chloride 0.9%, sodium chloride 0.9%, Pharmacy to dose Vancomycin consult **AND** vancomycin - pharmacy to dose, vancomycin    Review of patient's allergies indicates:   Allergen Reactions    Iodine containing multivitamin Swelling     itching    Keflex [cephalexin] Swelling     Eyes.  Tolerated multiple doses of zosyn and 1 dose of augmentin in 2015 and 2016, respectively    Peaches [peach (prunus persica)] Swelling     eyes    Shellfish containing products Swelling    Fig tree Swelling     itching    Tuberculin spenser test ppd Rash     Objective:     Vital Signs (Most  Recent):  Temp: 98.9 °F (37.2 °C) (09/29/22 1108)  Pulse: 95 (09/29/22 1108)  Resp: 18 (09/29/22 1108)  BP: (!) 104/56 (09/29/22 1108)  SpO2: 95 % (09/29/22 1108)   Vital Signs (24h Range):  Temp:  [98.2 °F (36.8 °C)-98.9 °F (37.2 °C)] 98.9 °F (37.2 °C)  Pulse:  [] 95  Resp:  [13-35] 18  SpO2:  [93 %-98 %] 95 %  BP: (103-134)/(56-66) 104/56  Arterial Line BP: (107-161)/() 107/60     Patient Vitals for the past 72 hrs (Last 3 readings):   Weight   09/27/22 0804 94.3 kg (208 lb)   09/27/22 0500 94.8 kg (208 lb 15.9 oz)       Body mass index is 32.58 kg/m².           Telemetry: no events on tele    Physical Exam  Physical Exam  Constitutional:       Appearance: Normal appearance. He is not ill-appearing.   Cardiovascular:      Rate and Rhythm: Normal rate and regular rhythm.      Pulses: Normal pulses.      Heart sounds: Normal heart sounds.   Elevated JVD.  Pulmonary:      Effort: Pulmonary effort is normal. B/l crackles over basal region.  Abdominal:      General: Abdomen is flat. Bowel sounds are normal. There is distension.      Palpations: Abdomen is soft.      Tenderness: There is abdominal tenderness.   Musculoskeletal:         General: No swelling   1+ pitting edema in b/l LE upto knee. Mild tenderness around left upper arm, no fluctuance.  Skin:     General: Skin is warm and dry. Left chest covered by gauze ( postoperative from device extraction)     Neurological:      Mental Status: He is alert and oriented to person, place, and time. Mental status is at baseline.   Psychiatric:         Behavior: Behavior normal.         Thought Content: Thought content normal.       Significant Labs:  CBC:  Recent Labs   Lab 09/27/22  1406 09/28/22  0441 09/29/22  0355   WBC 16.33* 9.43 7.12   RBC 4.07* 3.70* 4.11*   HGB 11.2* 10.3* 11.4*   HCT 36.3* 32.8* 35.9*    177 SEE COMMENT   MCV 89 89 87   MCH 27.5 27.8 27.7   MCHC 30.9* 31.4* 31.8*       BNP:  No results for input(s): BNP in the last 168  hours.    Invalid input(s): BNPTRIAGELBLO  CMP:  Recent Labs   Lab 09/27/22  1406 09/28/22  0441 09/29/22  0355   * 144* 88   CALCIUM 8.3* 9.1 8.7   ALBUMIN 3.1* 3.5 3.3*   PROT 6.2 6.7 6.1   * 136 138   K 4.1 4.7 4.5   CO2 22* 22* 23    102 105   BUN 20 28* 26*   CREATININE 1.7* 2.1* 1.6*   ALKPHOS 82 81 72   ALT 23 22 16   AST 26 20 16   BILITOT 0.4 0.4 0.5        Coagulation:   Recent Labs   Lab 09/25/22  0427 09/25/22  1509 09/26/22  0128   APTT 35.6* 65.8* 49.8*       LDH:  No results for input(s): LDH in the last 72 hours.  Microbiology:  Microbiology Results (last 7 days)       Procedure Component Value Units Date/Time    Culture, Anaerobe [[id number]] Collected: 09/27/22 0947    Order Status: Completed Specimen: Abscess from Chest, Left Updated: 09/28/22 0738     Anaerobic Culture Culture in progress    Narrative:      Deep Pocket #1    Culture, Anaerobe [[id number]] Collected: 09/27/22 1035    Order Status: Completed Specimen: Abscess from Heart Updated: 09/28/22 0738     Anaerobic Culture Culture in progress    Narrative:      RA Lead drainage #2    Culture, Anaerobe [[id number]] Collected: 09/27/22 1133    Order Status: Completed Specimen: Abscess from Heart Updated: 09/28/22 0738     Anaerobic Culture Culture in progress    Narrative:      RA Lead Tip    Culture, Anaerobe [[id number]] Collected: 09/27/22 0952    Order Status: Completed Specimen: Abscess from Chest, Left Updated: 09/28/22 0738     Anaerobic Culture Culture in progress    Narrative:      Deep Pocket #2    Culture, Anaerobe [[id number]] Collected: 09/27/22 1026    Order Status: Completed Specimen: Abscess from Heart Updated: 09/28/22 0738     Anaerobic Culture Culture in progress    Narrative:      LV Lead Tip    Culture, Anaerobe [[id number]] Collected: 09/27/22 1032    Order Status: Completed Specimen: Abscess from Heart Updated: 09/28/22 0738     Anaerobic Culture Culture in progress    Narrative:      RA Lead drainage #1     Culture, Anaerobe [[id number]] Collected: 09/27/22 1138    Order Status: Completed Specimen: Abscess from Heart Updated: 09/28/22 0738     Anaerobic Culture Culture in progress    Narrative:      RV Lead Tip    Culture, Anaerobe [[id number]] Collected: 09/27/22 0936    Order Status: Completed Specimen: Abscess from Chest, Left Updated: 09/28/22 0738     Anaerobic Culture Culture in progress    Narrative:      Shallow Pocket    Aerobic culture [[id number]] Collected: 09/27/22 0947    Order Status: Completed Specimen: Abscess from Chest, Left Updated: 09/28/22 0730     Aerobic Bacterial Culture No growth    Narrative:      Deep Pocket #1    Aerobic culture [[id number]] Collected: 09/27/22 0952    Order Status: Completed Specimen: Abscess from Chest, Left Updated: 09/28/22 0730     Aerobic Bacterial Culture No growth    Narrative:      Deep Pocket #2    Aerobic culture [[id number]] Collected: 09/27/22 1032    Order Status: Completed Specimen: Abscess from Heart Updated: 09/28/22 0730     Aerobic Bacterial Culture No growth    Narrative:      RA Lead drainage #1    Aerobic culture [[id number]] Collected: 09/27/22 1133    Order Status: Completed Specimen: Abscess from Heart Updated: 09/28/22 0730     Aerobic Bacterial Culture No growth    Narrative:      RA Lead Tip    Aerobic culture [[id number]] Collected: 09/27/22 0936    Order Status: Completed Specimen: Abscess from Chest, Left Updated: 09/28/22 0730     Aerobic Bacterial Culture No growth    Narrative:      Shallow pocket    Aerobic culture [[id number]] Collected: 09/27/22 1026    Order Status: Completed Specimen: Abscess from Heart Updated: 09/28/22 0730     Aerobic Bacterial Culture No growth    Narrative:      LV Lead Tip    Aerobic culture [[id number]] Collected: 09/27/22 1035    Order Status: Completed Specimen: Abscess from Heart Updated: 09/28/22 0730     Aerobic Bacterial Culture No growth    Narrative:      RA Lead drainage #2    Aerobic culture [[id number]]  Collected: 09/27/22 1138    Order Status: Completed Specimen: Abscess from Heart Updated: 09/28/22 0730     Aerobic Bacterial Culture No growth    Narrative:      RV Lead Tip    Culture, Anaerobe [[id number]]     Order Status: No result Specimen: Abscess from Heart     Aerobic culture [[id number]]     Order Status: No result Specimen: Abscess from Heart     Blood culture [[id number]] Collected: 09/18/22 1117    Order Status: Completed Specimen: Blood from Peripheral, Right Hand Updated: 09/23/22 1412     Blood Culture, Routine No growth after 5 days.    Blood culture [[id number]] Collected: 09/18/22 1105    Order Status: Completed Specimen: Blood from Antecubital, Right Arm Updated: 09/23/22 1212     Blood Culture, Routine No growth after 5 days.                Estimated Creatinine Clearance: 47 mL/min (A) (based on SCr of 1.6 mg/dL (H)).        Assessment and Plan:     73 yo WM being worked up for LVAD since hospitalization January 2022 for recurrent VT (he thinks had MI) and cardiogenic shock at [facility]. He was  later seen in [facility] where he was treated for cardiogenic shock and sent home on .  He remains on  and is pursuing evaluation for LVAD.     His case was discussed at selection committee and he was deferred pending evaluation of pancreatic mass.  They wish to proceed with MRI but not sure with his ICD if this will be possible.     Meanwhile Presents with MRSA bacteremia at this admission.     HARRY 9/21/22: Not concerning for Vegetations. BCx negative 9/18     Device removal by EP 9/27/22-Post op Acute Hypoxic respiratory failure and Hypotension  in the setting of volume overload from periopertaive fluids.  Resolved, step down to CSU today.         * Chronic combined systolic and diastolic congestive heart failure  - On Dobutamine 2.5 ( baseline on admission)  - will restart Valsartan 40  today and later transition to entresto as he tolerates.  - Hold diuretics currently . Cr improved.   - Daily weights, Strict  I/Os  - Monitor electrolytes and Maintain Mg >2 and K >4  -Telemetry monitoring  - will get PICC line rt arm and eventually remove central line.           Pancreatic lesion  - Gastroenterology consult and recommendations appreciated  - Will get an MRI today to characterize the lesion for LVAD eval      Bacteremia due to methicillin resistant Staphylococcus aureus  - Monitor WBC count and fever curve, pan-culture if patient spikes  - ID consult and recommendations are appreciated  - Vancomycin 1,250, daily; Monitor level and renal panel  -patient has persistent positive blood cultures.  Cultures from 18 no growth so far.  His currently on vancomycin.  Id on board.  - HARRY 9/21 not concerning for any vegetations  -Device extraction done 9/27/22.   -will eventually need abx for 6 wks following lead removal 9/27/22.      H/O ventricular tachycardia  - Amiodarone 300 mg, daily  - Monitor LFTs.      Coronary artery disease involving native coronary artery of native heart without angina pectoris  - Asprin 81 mg, daily  - Atorvastatin 80 mg, nightly     Left Brachial Vein Thrombosis 9/21  - Heparin on hold per EP, will start oral AC in 72 hrs after device removal per EP. Will restart 9/30.     Benton Rendon MD  Heart Transplant  [facility] - Cardiology Stepdown

## 2022-09-29 NOTE — PT/OT/SLP PROGRESS
Occupational Therapy      Patient Name:  Audrey Garciaediwn Mccarthy   MRN:  [id number]    Patient not seen today secondary to out of room upon OT arrival at 1450 . Will follow-up 09/30/22.    9/29/2022

## 2022-09-29 NOTE — NURSING TRANSFER
Nursing Transfer Note      9/29/2022     Reason patient is being transferred: stepdown    Transfer To: 303    Transfer via wheelchair    Transfer with cardiac monitoring    Transported by this RN    Medicines sent: N/A    Any special needs or follow-up needed: N/A    Chart send with patient: Yes    Notified: friend    Report given to floor nurseJohnna. Care relinquished.

## 2022-09-30 NOTE — PT/OT/SLP PROGRESS
Physical Therapy discharge       Patient Name:  Audrey Oneil Jr.   MRN:  [id number]    Patient transferred back to ICU on 9/29, new orders required when medically appropriate for PT re-evaluation.

## 2022-09-30 NOTE — ASSESSMENT & PLAN NOTE
Suspected aspiration yesterday with resultant hypoxia s/p intubation. CXR with bilateral infiltrates.     Recommendations:  -continue zosyn pending work up  -will follow up respiratory cultures

## 2022-09-30 NOTE — PROGRESS NOTES
[facility] - Cardiac Intensive Care  Heart Transplant  Progress Note    Patient Name: Audrey Oneil Jr.  MRN: [id number]  Admission Date: 9/14/2022  Hospital Length of Stay: 16 days  Attending Physician: Cherelle Hidalgo DO  Primary Care Provider: Primary Doctor No  Principal Problem:Chronic combined systolic and diastolic congestive heart failure    Subjective:     Interval History:     Underwent device extraction by EP 9/27.was stable and step down to CSU 9/29.   Pt was hemodynamically stable and at his baseline when he developed sudden onset SOB, became hypoxic while they were placing a routine PICC line for abx to discharge him on .  He was intubated and requiring pressor support with epi, levo and dobutamine(5) and transferred to ICU on 9/29 at 5pm. Of note pt is on Dobutamine at 2.5 at his baseline.   Continuous Infusions:   sodium chloride 0.9% Stopped (09/28/22 1550)    sodium chloride 0.9% 5 mL/hr at 09/30/22 1416    dexmedetomidine (PRECEDEX) infusion 0.5 mcg/kg/hr (09/30/22 1300)    DOBUTamine IV infusion (non-titrating) 2.5 mcg/kg/min (09/30/22 1300)    EPINEPHrine Stopped (09/30/22 0207)    propofoL 30 mcg/kg/min (09/30/22 1300)    vasopressin 0.04 Units/min (09/30/22 1300)     Scheduled Meds:   acetaminophen  650 mg Oral QID    allopurinoL  200 mg Oral Daily    amiodarone  300 mg Oral Daily    aspirin  81 mg Oral Daily    atorvastatin  80 mg Oral Daily    DAPTOmycin (CUBICIN) IV (PEDS and ADULTS)  10 mg/kg Intravenous Q24H    famotidine (PF)  20 mg Intravenous Daily    LIDOcaine  1 patch Transdermal Q24H    piperacillin-tazobactam (ZOSYN) IVPB  4.5 g Intravenous Q8H    polyethylene glycol  17 g Oral Daily     PRN Meds:sodium chloride, acetaminophen, dextromethorphan-guaiFENesin  mg, HYDROcodone-acetaminophen, methocarbamoL, ondansetron, senna-docusate 8.6-50 mg, sodium chloride 0.9%    Review of patient's allergies indicates:   Allergen Reactions    Iodine containing multivitamin Swelling      itching    Keflex [cephalexin] Swelling     Eyes.  Tolerated multiple doses of zosyn and 1 dose of augmentin in 2015 and 2016, respectively    Peaches [peach (prunus persica)] Swelling     eyes    Shellfish containing products Swelling    Fig tree Swelling     itching    Tuberculin spenser test ppd Rash     Objective:     Vital Signs (Most Recent):  Temp: 98.6 °F (37 °C) (09/30/22 0701)  Pulse: 61 (09/30/22 1500)  Resp: 20 (09/30/22 1500)  BP: 106/61 (09/30/22 0701)  SpO2: 97 % (09/30/22 1539)   Vital Signs (24h Range):  Temp:  [98.5 °F (36.9 °C)-98.6 °F (37 °C)] 98.6 °F (37 °C)  Pulse:  [] 61  Resp:  [20-55] 20  SpO2:  [95 %-100 %] 97 %  BP: ()/(49-63) 106/61  Arterial Line BP: (119-130)/(60-63) 130/63     No data found.    Body mass index is 32.58 kg/m².           Telemetry: no events on tele    Physical Exam  Physical Exam  General: pt intubated and sedated    Cardiovascular:      Rate and Rhythm: Normal rate and regular rhythm.      Pulses: Normal pulses.      Heart sounds: Normal heart sounds.   Elevated JVD.  Pulmonary:      Effort: Pulmonary effort is normal. B/l crackles over basal region.  Abdominal:      General: Abdomen is flat. Bowel sounds are normal. There is distension.      Palpations: Abdomen is soft.      Musculoskeletal:         General: No swelling      General: Skin is warm and dry. Left chest covered by gauze ( postoperative from device extraction)     Neurological:      Mental Status:sedated and intubated  Psychiatric:       sedated and intubated      Significant Labs:  CBC:  Recent Labs   Lab 09/29/22  0355 09/29/22  1746 09/30/22  0421   WBC 7.12 17.97* 6.71   RBC 4.11* 4.02* 3.43*   HGB 11.4* 11.0* 9.5*   HCT 35.9* 36.8* 29.2*   PLT SEE COMMENT 452* 313   MCV 87 92 85   MCH 27.7 27.4 27.7   MCHC 31.8* 29.9* 32.5       BNP:  No results for input(s): BNP in the last 168 hours.    Invalid input(s): BNPTRIAGELBLO  CMP:  Recent Labs   Lab 09/29/22  0355 09/29/22  1746 09/30/22  0421    GLU 88 240* 116*   CALCIUM 8.7 8.4* 8.8   ALBUMIN 3.3* 3.3* 3.1*   PROT 6.1 6.7 6.2    134* 134*   K 4.5 5.0 4.9   CO2 23 22* 20*    100 103   BUN 26* 26* 28*   CREATININE 1.6* 1.9* 1.8*   ALKPHOS 72 91 75   ALT 16 16 14   AST 16 17 14   BILITOT 0.5 0.5 0.5        Coagulation:   Recent Labs   Lab 09/25/22  1509 09/26/22  0128 09/30/22  0421   APTT 65.8* 49.8* 26.5       LDH:  No results for input(s): LDH in the last 72 hours.  Microbiology:  Microbiology Results (last 7 days)       Procedure Component Value Units Date/Time    Aerobic culture [[id number]] Collected: 09/27/22 1133    Order Status: Completed Specimen: Abscess from Heart Updated: 09/30/22 1153     Aerobic Bacterial Culture No growth    Narrative:      RA Lead Tip    Aerobic culture [[id number]] Collected: 09/27/22 0952    Order Status: Completed Specimen: Abscess from Chest, Left Updated: 09/30/22 1153     Aerobic Bacterial Culture No growth    Narrative:      Deep Pocket #2    Aerobic culture [[id number]] Collected: 09/27/22 1032    Order Status: Completed Specimen: Abscess from Heart Updated: 09/30/22 1153     Aerobic Bacterial Culture No growth    Narrative:      RA Lead drainage #1    Aerobic culture [[id number]] Collected: 09/27/22 0947    Order Status: Completed Specimen: Abscess from Chest, Left Updated: 09/30/22 1153     Aerobic Bacterial Culture No growth    Narrative:      Deep Pocket #1    Aerobic culture [[id number]] Collected: 09/27/22 1026    Order Status: Completed Specimen: Abscess from Heart Updated: 09/30/22 1153     Aerobic Bacterial Culture No growth    Narrative:      LV Lead Tip    Aerobic culture [[id number]] Collected: 09/27/22 1035    Order Status: Completed Specimen: Abscess from Heart Updated: 09/30/22 1153     Aerobic Bacterial Culture No growth    Narrative:      RA Lead drainage #2    Aerobic culture [[id number]] Collected: 09/27/22 1138    Order Status: Completed Specimen: Abscess from Heart Updated: 09/30/22 1153      Aerobic Bacterial Culture No growth    Narrative:      RV Lead Tip    Aerobic culture [[id number]] Collected: 09/27/22 0936    Order Status: Completed Specimen: Abscess from Chest, Left Updated: 09/30/22 1153     Aerobic Bacterial Culture No growth    Narrative:      Shallow pocket    Blood culture [[id number]] Collected: 09/30/22 0243    Order Status: Sent Specimen: Blood from Peripheral, Antecubital, Right Updated: 09/30/22 0331    Blood culture [[id number]] Collected: 09/30/22 0241    Order Status: Sent Specimen: Blood from Peripheral, Hand, Right Updated: 09/30/22 0331    Culture, Respiratory with Gram Stain [[id number]]     Order Status: No result Specimen: Respiratory     Culture, Anaerobe [[id number]] Collected: 09/27/22 1138    Order Status: Completed Specimen: Abscess from Heart Updated: 09/29/22 1324     Anaerobic Culture Culture in progress    Narrative:      RV Lead Tip    Culture, Anaerobe [[id number]] Collected: 09/27/22 1035    Order Status: Completed Specimen: Abscess from Heart Updated: 09/29/22 1323     Anaerobic Culture Culture in progress    Narrative:      RA Lead drainage #2    Culture, Anaerobe [[id number]] Collected: 09/27/22 1032    Order Status: Completed Specimen: Abscess from Heart Updated: 09/29/22 1322     Anaerobic Culture Culture in progress    Narrative:      RA Lead drainage #1    Culture, Anaerobe [[id number]] Collected: 09/27/22 0936    Order Status: Completed Specimen: Abscess from Chest, Left Updated: 09/29/22 1322     Anaerobic Culture Culture in progress    Narrative:      Shallow Pocket    Culture, Anaerobe [[id number]] Collected: 09/27/22 1133    Order Status: Completed Specimen: Abscess from Heart Updated: 09/29/22 1321     Anaerobic Culture Culture in progress    Narrative:      RA Lead Tip    Culture, Anaerobe [[id number]] Collected: 09/27/22 1026    Order Status: Completed Specimen: Abscess from Heart Updated: 09/29/22 1320     Anaerobic Culture Culture in progress     Narrative:      LV Lead Tip    Culture, Anaerobe [[id number]] Collected: 09/27/22 0952    Order Status: Completed Specimen: Abscess from Chest, Left Updated: 09/29/22 1320     Anaerobic Culture Culture in progress    Narrative:      Deep Pocket #2    Culture, Anaerobe [[id number]] Collected: 09/27/22 0947    Order Status: Completed Specimen: Abscess from Chest, Left Updated: 09/29/22 1319     Anaerobic Culture Culture in progress    Narrative:      Deep Pocket #1    Culture, Anaerobe [[id number]]     Order Status: No result Specimen: Abscess from Heart     Aerobic culture [[id number]]     Order Status: No result Specimen: Abscess from Heart                 Estimated Creatinine Clearance: 41.8 mL/min (A) (based on SCr of 1.8 mg/dL (H)).    Diagnostic Results:      Assessment and Plan:   73 yo WM being worked up for LVAD since hospitalization January 2022 for recurrent VT (he thinks had MI) and cardiogenic shock at [facility]. He was  later seen in [facility] where he was treated for cardiogenic shock and sent home on .  He remains on  and is pursuing evaluation for LVAD.     His case was discussed at selection committee and he was deferred pending evaluation of pancreatic mass.  They wish to proceed with MRI but not sure with his ICD if this will be possible.     Meanwhile Presents with MRSA bacteremia at this admission.     HARRY 9/21/22: Not concerning for Vegetations. BCx negative 9/18     Device removal by EP 9/27/22    Post op day 2  Acute Hypoxic respiratory failure 9/29/22 suspect aspiration event vs ?PE  -intubated and sedated 9/29  -currently Prop 25, precedex 0.03  Vaso 0.02, Dobut 5.  - maintain EVENS -1 to +1  -SAT, SBT     * Chronic combined systolic and diastolic congestive heart failure  - On Dobutamine 5 since yesterday event. ( baseline is 2.5), requiring vaso 0.02 to maintain MAPS >60. Wean as tolerated  -CVP 10, MVO2 82  - given 80 iv lasix today.   - Daily weights, Strict I/Os  - Monitor electrolytes and  Maintain Mg >2 and K >4  -Telemetry monitoring        Pancreatic lesion  - Gastroenterology consult and recommendations appreciated  - MRI once stable to characterize the lesion for LVAD eval      Bacteremia due to methicillin resistant Staphylococcus aureus  - Monitor WBC count and fever curve, pan-culture if patient spikes  - ID consult and recommendations are appreciated  - On Dapto currently end date 11/11 per ID ( 6 wks after device removal 9/27) for the MRSA bacteremia 9/14, 9/15, 9/16. 9/18 NG  - on Zosyn started 9/30/22  for concern for aspiration recently. F/u BC  -will ask EP for recommendations on restarting Heparin.    H/O ventricular tachycardia  - Amiodarone 300 mg, daily  - Monitor LFTs.      Coronary artery disease involving native coronary artery of native heart without angina pectoris  - Asprin 81 mg, daily  - Atorvastatin 80 mg, nightly     Left Brachial Vein Thrombosis 9/21  - Heparin on hold per EP, will ask EP for restarting heparin.         Benton Rendon MD  Heart Transplant  [facility] - Cardiac Intensive Care

## 2022-09-30 NOTE — CARE UPDATE
Hemodynamics Care Update Note    SVO2: 82  CVP: 5  CO: 13.9   CI: 6.6  SVR: 367  I: 1.2 UOP: 150 cc in last several hrs   (calculated using oxygen consumption constant of 3.5)    Lasix 80 IV given earlier in night   Pt in NSR      Plan:  Blood Cx, Sputum Cx and UA/UCX ordered    Continue Dobutamine at 2.5   Start Vaso at .04   Wean Epi as tolerated     Case discussed with on call HTS attending.    Curry Mendoza, PGY-5  Cardiovascular Disease  [facility]

## 2022-09-30 NOTE — PLAN OF CARE
Cardiac ICU Care Plan    POC reviewed with Audrey Oneil Jr. and family.See below and flowsheets for full assessment and VS info.     Neuro:  Sidney Coma Scale  Best Eye Response: 3-->(E3) to speech  Best Motor Response: 4-->(M4) withdraws from pain  Best Verbal Response: 5-->(V5) oriented  Lai Coma Scale Score: 15  Assessment Qualifiers: patient chemically sedated or paralyzed, patient intubated  Pupil PERRLA: yes    24 hr Temp:  [98.3 °F (36.8 °C)-98.9 °F (37.2 °C)]      CV:  Rhythm: normal sinus rhythm   DVT prophylaxis: VTE Required Core Measure: Pharmacological prophylaxis initiated/maintained    CVP (mean): 5 mmHg (09/29/22 0700)    [REMOVED] PICC Double Lumen 09/22/22 1507 right basilic-Current Insertion Depth (cm): 36 cm (09/22/22 1507)  SVO2 (%): 58 % (09/26/22 1020)               Pulses  Right Radial Pulse: 2+ (normal)  Left Radial Pulse: 2+ (normal)  Right Dorsalis Pedis Pulse: 2+ (normal)  Left Dorsalis Pedis Pulse: 2+ (normal)  Right Posterior Tibial Pulse: 2+ (normal)  Left Posterior Tibial Pulse: 2+ (normal)    Resp:  O2 Device (Oxygen Therapy): ventilator  Flow (L/min): 2  Vent Mode: A/C  Set Rate: 24 BPM  Oxygen Concentration (%): 80  Vt Set: 500 mL  PEEP/CPAP: 10 cmH20    GI/:  GI prophylaxis: not ordered   Diet/Nutrition Received: NPO  Last Bowel Movement: 09/28/22  Voiding Characteristics: urethral catheter (bladder)   Intake/Output Summary (Last 24 hours) at 9/29/2022 2125  Last data filed at 9/29/2022 2000  Gross per 24 hour   Intake 1282.78 ml   Output 450 ml   Net 832.78 ml       Labs/Accuchecks:  Recent Labs   Lab 09/28/22  0441 09/29/22  0355 09/29/22  1746   WBC 9.43 7.12 17.97*   RBC 3.70* 4.11* 4.02*   HGB 10.3* 11.4* 11.0*   HCT 32.8* 35.9* 36.8*    SEE COMMENT 452*      Recent Labs   Lab 09/25/22  0427 09/25/22  1509 09/26/22  0128   APTT 35.6* 65.8* 49.8*      Recent Labs     09/29/22  1746   *   K 5.0   CO2 22*      BUN 26*   CREATININE 1.9*   ALKPHOS 91    ALT 16   AST 17   BILITOT 0.5     No results for input(s): CPK, CPKMB, MB, TROPONINI in the last 168 hours.   Recent Labs     09/28/22  2322 09/29/22  1816 09/29/22  1936   PH 7.332* 7.216* 7.315*   PCO2 47.1* 60.7* 47.7*   PO2 38* 183* 132*   HCO3 25.0 24.6 24.3   POCSATURATED 67* 99 99   BE -1 -3 -2       Electrolytes: N/A - electrolytes WDL  Accuchecks: none    Gtts/LDAs:   sodium chloride 0.9% Stopped (09/28/22 1550)    dexmedetomidine (PRECEDEX) infusion 0.2 mcg/kg/hr (09/29/22 2000)    DOBUTamine IV infusion (non-titrating) 2.5 mcg/kg/min (09/29/22 1700)    EPINEPHrine 0.06 mcg/kg/min (09/29/22 2000)    propofoL 35 mcg/kg/min (09/29/22 2013)       Lines/Drains/Airways       Central Venous Catheter Line  Duration             Percutaneous Central Line Insertion/Assessment - Triple Lumen  09/27/22 0745 left internal jugular 2 days              Airway  Duration                  Airway - Non-Surgical 09/29/22 1647 Endotracheal Tube <1 day       Airway Anesthesia 09/29/22 <1 day              Peripheral Intravenous Line  Duration                  Peripheral IV - Single Lumen 09/26/22 2000 20 G;1 3/4 in Left Forearm 3 days         Peripheral IV - Single Lumen 09/26/22 2000 20 G;1 3/4 in Right Forearm 3 days                    Skin/Wounds  Bathing/Skin Care: back care;dressed/undressed;electrode patches/site rotation;incontinence care (09/29/22 1700)

## 2022-09-30 NOTE — SUBJECTIVE & OBJECTIVE
Past Medical History:   Diagnosis Date    Acute hypoxemic respiratory failure 11/7/2015    Acute idiopathic gout of left knee 12/2/2019    Acute idiopathic gout of right elbow 9/23/2021    AICD (automatic cardioverter/defibrillator) present 12/13/2015      Anticoagulant long-term use     Bronchopneumonia 12/20/2021    CHF (congestive heart failure)     Chronic anticoagulation 5/5/2016    Chronic combined systolic and diastolic heart failure 11/26/2012    EF 10-20% on ECHO 2013    Chronic gout 12/2/2019    Clotting disorder     Coronary artery disease involving native coronary artery of native heart without angina pectoris 11/26/2012    Cath 10- Stents patent non-obstructive disease Cath 11-12015 non-obstructive disease     COVID-19 08/2021    Had antibody infusion    Diverticulosis of colon     DVT (deep venous thrombosis), unspecified laterality 11/12/2015    Dysphonia 1/28/2018    Essential hypertension 11/15/2015    Fine motor impairment 2/2/2021    Hyperlipidemia     Hypertensive heart disease with heart failure 5/5/2016    MI (myocardial infarction) 2009    x's 5    Nicotine abuse     Obesity 11/26/2012    Olecranon bursitis of right elbow 9/19/2021    Pulmonary embolism 2011    Renal disorder     LAVERNE    Right carpal tunnel syndrome 4/6/2018    Stented coronary artery 11/26/2012    LAD stent placed 10/17/2007     Trigger thumb of right hand 4/6/2018       Past Surgical History:   Procedure Laterality Date    APPENDECTOMY      BACK SURGERY      CARDIAC DEFIBRILLATOR PLACEMENT      CARDIAC SURGERY  2007    stent    CARPAL TUNNEL RELEASE Right 04/2018    COLONOSCOPY N/A 8/8/2022    Procedure: COLONOSCOPY;  Surgeon: Sascha Keenan MD;  Location: [facility] ([address] [place] [address]);  Service: Endoscopy;  Laterality: N/A;  EF-15%  pre heart    AICD-St Rodri       COVID test [facility] 8/5-inst mail-am prep-clears 4 hrs prior-tb    INSERTION OF BIVENTRICULAR IMPLANTABLE CARDIOVERTER-DEFIBRILLATOR (ICD) N/A 5/3/2019     Procedure: INSERTION, ICD, BIVENTRICULAR;  Surgeon: Toefilo Pal MD;  Location: [facility] EP LAB;  Service: Cardiology;  Laterality: N/A;  ICH CM,  ICD UPGD BI-V,  SJM, MAC, FAS, 3PREP (dual ICD INSITU)    r knee scope      REVISION OF SKIN POCKET FOR CARDIOVERTER-DEFIBRILLATOR Left 5/3/2019    Procedure: Revision, Skin Pocket, For Cardioverter-Defibrillator;  Surgeon: Teofilo Pal MD;  Location: [facility] EP LAB;  Service: Cardiology;  Laterality: Left;    RIGHT HEART CATHETERIZATION Right 6/14/2021    Procedure: INSERTION, CATHETER, RIGHT HEART;  Surgeon: Lizz Nieto MD;  Location: [facility] CATH LAB;  Service: Cardiology;  Laterality: Right;    RIGHT HEART CATHETERIZATION Right 6/17/2022    Procedure: INSERTION, CATHETER, RIGHT HEART;  Surgeon: Migue Henry Jr., MD;  Location: [facility] CATH LAB;  Service: Cardiology;  Laterality: Right;    SPINE SURGERY      TONSILLECTOMY      TRIGGER FINGER RELEASE Right 04/2018    thumb       Review of patient's allergies indicates:   Allergen Reactions    Iodine containing multivitamin Swelling     itching    Keflex [cephalexin] Swelling     Eyes.  Tolerated multiple doses of zosyn and 1 dose of augmentin in 2015 and 2016, respectively    Peaches [peach (prunus persica)] Swelling     eyes    Shellfish containing products Swelling    Fig tree Swelling     itching    Tuberculin spenser test ppd Rash       Medications:  Medications Prior to Admission   Medication Sig    allopurinoL (ZYLOPRIM) 100 MG tablet Take 2 tablets (200 mg total) by mouth once daily.    amiodarone (PACERONE) 200 MG Tab TAKE 1 AND 1/2 TABLETS(300 MG) BY MOUTH EVERY DAY    aspirin (ECOTRIN) 81 MG EC tablet Take 1 tablet (81 mg total) by mouth once daily.    atorvastatin (LIPITOR) 80 MG tablet Take 1 tablet (80 mg total) by mouth once daily.    DOBUTamine (DOBUTREX) 500 mg/250 mL (2,000 mcg/mL) 2.5 mcg/kg/min  Patient is 95.7 kg    furosemide (LASIX) 40 MG tablet Take 1 tablet (40 mg total) by mouth daily  as needed (Weight gain of 3 lbs in one day or 5 lbs in one week).    HYDROcodone-acetaminophen (NORCO)  mg per tablet Take 1 tablet by mouth every 6 (six) hours.    JARDIANCE 25 mg tablet Take 1 tablet (25 mg total) by mouth once daily.    ondansetron (ZOFRAN-ODT) 4 MG TbDL Dissolve 1 tablet (4 mg total) by mouth every 6 (six) hours as needed (nausea).    polyethylene glycol (GOLYTELY) 236-22.74-6.74 -5.86 gram suspension SMARTSIG:Milliliter(s) By Mouth    sacubitriL-valsartan (ENTRESTO) 49-51 mg per tablet Take 1 tablet by mouth 2 (two) times daily.    simethicone (MYLICON) 80 MG chewable tablet Take 1 tablet (80 mg total) by mouth every 6 (six) hours as needed for Flatulence.    spironolactone (ALDACTONE) 25 MG tablet Take 1 tablet (25 mg total) by mouth once daily.     Antibiotics (From admission, onward)      Start     Stop Route Frequency Ordered    09/30/22 1130  piperacillin-tazobactam 4.5 g in sodium chloride 0.9% 100 mL IVPB (ready to mix system)         -- IV Every 8 hours (non-standard times) 09/30/22 1027    09/30/22 0900  DAPTOmycin (CUBICIN) 945 mg in sodium chloride 0.9% 50 mL IVPB         -- IV Every 24 hours (non-standard times) 09/29/22 1516          Antifungals (From admission, onward)      None          Antivirals (From admission, onward)      None             Immunization History   Administered Date(s) Administered    Hepatitis A, Adult 09/20/2005    Influenza 11/15/2002    Influenza (FLUAD) - Quadrivalent - Adjuvanted - PF *Preferred* (65+) 12/15/2021    Influenza - High Dose - PF (65 years and older) 10/12/2017, 12/02/2019    Influenza Split 09/20/2005    Pneumococcal Conjugate - 13 Valent 06/01/2017    Pneumococcal Polysaccharide - 23 Valent 06/02/2014, 12/02/2019    Td (Adult), Unspecified Formulation 08/01/2005    Tdap 06/06/2016    Zoster 10/20/2017       Family History       Problem Relation (Age of Onset)    Cancer Mother, Paternal Grandmother    Colon polyps Father    Diabetes  Mother    Heart disease Mother, Father    No Known Problems Sister, Daughter, Son, Sister, Son    Stroke Father          Social History     Socioeconomic History    Marital status:     Number of children: 2   Occupational History    Occupation: [employer]     Comment: unemployed   Tobacco Use    Smoking status: Former     Packs/day: 1.00     Years: 45.00     Pack years: 45.00     Types: Cigarettes     Quit date: 2005     Years since quittin.8    Smokeless tobacco: Never    Tobacco comments:     1-1.5 ppd every day.   Substance and Sexual Activity    Alcohol use: No    Drug use: No    Sexual activity: Not Currently     Review of Systems   Unable to perform ROS: Intubated   Objective:     Vital Signs (Most Recent):  Temp: 98.6 °F (37 °C) (22 0701)  Pulse: 63 (22 1100)  Resp: 20 (22 1100)  BP: 106/61 (22 0701)  SpO2: 96 % (22 1100) Vital Signs (24h Range):  Temp:  [98.5 °F (36.9 °C)-98.6 °F (37 °C)] 98.6 °F (37 °C)  Pulse:  [] 63  Resp:  [16-55] 20  SpO2:  [95 %-100 %] 96 %  BP: ()/(49-63) 106/61  Arterial Line BP: (119-130)/(60-63) 130/63     Weight: 94.3 kg (208 lb)  Body mass index is 32.58 kg/m².    Estimated Creatinine Clearance: 41.8 mL/min (A) (based on SCr of 1.8 mg/dL (H)).    Physical Exam  Constitutional:       General: He is not in acute distress.     Appearance: He is ill-appearing. He is not toxic-appearing or diaphoretic.   HENT:      Head: Normocephalic and atraumatic.      Right Ear: External ear normal.      Left Ear: External ear normal.      Mouth/Throat:      Comments: ETT  Eyes:      General:         Right eye: No discharge.         Left eye: No discharge.   Cardiovascular:      Rate and Rhythm: Normal rate and regular rhythm.   Pulmonary:      Effort: Pulmonary effort is normal. No respiratory distress.      Breath sounds: No wheezing, rhonchi or rales.   Abdominal:      General: Bowel sounds are normal. There is no distension.       Palpations: Abdomen is soft.      Tenderness: There is no abdominal tenderness. There is no guarding.   Genitourinary:     Comments: valentin  Musculoskeletal:      Right lower leg: No edema.      Left lower leg: No edema.   Skin:     General: Skin is warm and dry.      Findings: No erythema or rash.      Comments: Prior PM site - dressed   Neurological:      Comments: sedated       Significant Labs:   Microbiology Results (last 7 days)       Procedure Component Value Units Date/Time    Aerobic culture [[id number]] Collected: 09/27/22 1133    Order Status: Completed Specimen: Abscess from Heart Updated: 09/30/22 1153     Aerobic Bacterial Culture No growth    Narrative:      RA Lead Tip    Aerobic culture [[id number]] Collected: 09/27/22 0952    Order Status: Completed Specimen: Abscess from Chest, Left Updated: 09/30/22 1153     Aerobic Bacterial Culture No growth    Narrative:      Deep Pocket #2    Aerobic culture [[id number]] Collected: 09/27/22 1032    Order Status: Completed Specimen: Abscess from Heart Updated: 09/30/22 1153     Aerobic Bacterial Culture No growth    Narrative:      RA Lead drainage #1    Aerobic culture [[id number]] Collected: 09/27/22 0947    Order Status: Completed Specimen: Abscess from Chest, Left Updated: 09/30/22 1153     Aerobic Bacterial Culture No growth    Narrative:      Deep Pocket #1    Aerobic culture [[id number]] Collected: 09/27/22 1026    Order Status: Completed Specimen: Abscess from Heart Updated: 09/30/22 1153     Aerobic Bacterial Culture No growth    Narrative:      LV Lead Tip    Aerobic culture [[id number]] Collected: 09/27/22 1035    Order Status: Completed Specimen: Abscess from Heart Updated: 09/30/22 1153     Aerobic Bacterial Culture No growth    Narrative:      RA Lead drainage #2    Aerobic culture [[id number]] Collected: 09/27/22 1138    Order Status: Completed Specimen: Abscess from Heart Updated: 09/30/22 1153     Aerobic Bacterial Culture No growth    Narrative:       RV Lead Tip    Aerobic culture [[id number]] Collected: 09/27/22 0936    Order Status: Completed Specimen: Abscess from Chest, Left Updated: 09/30/22 1153     Aerobic Bacterial Culture No growth    Narrative:      Shallow pocket    Blood culture [[id number]] Collected: 09/30/22 0243    Order Status: Sent Specimen: Blood from Peripheral, Antecubital, Right Updated: 09/30/22 0331    Blood culture [[id number]] Collected: 09/30/22 0241    Order Status: Sent Specimen: Blood from Peripheral, Hand, Right Updated: 09/30/22 0331    Culture, Respiratory with Gram Stain [[id number]]     Order Status: No result Specimen: Respiratory     Culture, Anaerobe [[id number]] Collected: 09/27/22 1138    Order Status: Completed Specimen: Abscess from Heart Updated: 09/29/22 1324     Anaerobic Culture Culture in progress    Narrative:      RV Lead Tip    Culture, Anaerobe [[id number]] Collected: 09/27/22 1035    Order Status: Completed Specimen: Abscess from Heart Updated: 09/29/22 1323     Anaerobic Culture Culture in progress    Narrative:      RA Lead drainage #2    Culture, Anaerobe [[id number]] Collected: 09/27/22 1032    Order Status: Completed Specimen: Abscess from Heart Updated: 09/29/22 1322     Anaerobic Culture Culture in progress    Narrative:      RA Lead drainage #1    Culture, Anaerobe [[id number]] Collected: 09/27/22 0936    Order Status: Completed Specimen: Abscess from Chest, Left Updated: 09/29/22 1322     Anaerobic Culture Culture in progress    Narrative:      Shallow Pocket    Culture, Anaerobe [[id number]] Collected: 09/27/22 1133    Order Status: Completed Specimen: Abscess from Heart Updated: 09/29/22 1321     Anaerobic Culture Culture in progress    Narrative:      RA Lead Tip    Culture, Anaerobe [[id number]] Collected: 09/27/22 1026    Order Status: Completed Specimen: Abscess from Heart Updated: 09/29/22 1320     Anaerobic Culture Culture in progress    Narrative:      LV Lead Tip    Culture, Anaerobe [[id number]]  Collected: 09/27/22 0952    Order Status: Completed Specimen: Abscess from Chest, Left Updated: 09/29/22 1320     Anaerobic Culture Culture in progress    Narrative:      Deep Pocket #2    Culture, Anaerobe [[id number]] Collected: 09/27/22 0947    Order Status: Completed Specimen: Abscess from Chest, Left Updated: 09/29/22 1319     Anaerobic Culture Culture in progress    Narrative:      Deep Pocket #1    Culture, Anaerobe [[id number]]     Order Status: No result Specimen: Abscess from Heart     Aerobic culture [[id number]]     Order Status: No result Specimen: Abscess from Heart     Blood culture [[id number]] Collected: 09/18/22 1117    Order Status: Completed Specimen: Blood from Peripheral, Right Hand Updated: 09/23/22 1412     Blood Culture, Routine No growth after 5 days.            Significant Imaging: I have reviewed all pertinent imaging results/findings within the past 24 hours.

## 2022-09-30 NOTE — ASSESSMENT & PLAN NOTE
MRSA bacteremia c/b AV echodensity c/f endocarditis, no lead involvement s/p PM extraction on 9/27. OR cx from 9/27 so far ngtd. Blcx ngtd from 9/18. Blcx repeated 9/30 after transfer to ICU. Remains critically ill, intubated and on pressors.     Recommendations:  -continue daptomycin 10mg/kg IV daily, anticipate 6w from device extraction  -monitor crcl closely   -weekly CK while on dapto

## 2022-09-30 NOTE — SUBJECTIVE & OBJECTIVE
Interval History:     Underwent device extraction by EP 9/27.was stable and step down to CSU 9/29.   Pt was hemodynamically stable and at his baseline when he developed sudden onset SOB, became hypoxic while they were placing a routine PICC line for abx to discharge him on .  He was intubated and requiring pressor support with epi, levo and dobutamine(5) and transferred to ICU on 9/29 at 5pm. Of note pt was on Dobutamine at 2.5 at his baseline.   Continuous Infusions:   sodium chloride 0.9% Stopped (09/28/22 1550)    sodium chloride 0.9% 5 mL/hr at 09/30/22 1416    dexmedetomidine (PRECEDEX) infusion 0.5 mcg/kg/hr (09/30/22 1300)    DOBUTamine IV infusion (non-titrating) 2.5 mcg/kg/min (09/30/22 1300)    EPINEPHrine Stopped (09/30/22 0207)    propofoL 30 mcg/kg/min (09/30/22 1300)    vasopressin 0.04 Units/min (09/30/22 1300)     Scheduled Meds:   acetaminophen  650 mg Oral QID    allopurinoL  200 mg Oral Daily    amiodarone  300 mg Oral Daily    aspirin  81 mg Oral Daily    atorvastatin  80 mg Oral Daily    DAPTOmycin (CUBICIN) IV (PEDS and ADULTS)  10 mg/kg Intravenous Q24H    famotidine (PF)  20 mg Intravenous Daily    LIDOcaine  1 patch Transdermal Q24H    piperacillin-tazobactam (ZOSYN) IVPB  4.5 g Intravenous Q8H    polyethylene glycol  17 g Oral Daily     PRN Meds:sodium chloride, acetaminophen, dextromethorphan-guaiFENesin  mg, HYDROcodone-acetaminophen, methocarbamoL, ondansetron, senna-docusate 8.6-50 mg, sodium chloride 0.9%    Review of patient's allergies indicates:   Allergen Reactions    Iodine containing multivitamin Swelling     itching    Keflex [cephalexin] Swelling     Eyes.  Tolerated multiple doses of zosyn and 1 dose of augmentin in 2015 and 2016, respectively    Peaches [peach (prunus persica)] Swelling     eyes    Shellfish containing products Swelling    Fig tree Swelling     itching    Tuberculin spenser test ppd Rash     Objective:     Vital Signs (Most Recent):  Temp: 98.6 °F (37 °C)  (09/30/22 0701)  Pulse: 61 (09/30/22 1500)  Resp: 20 (09/30/22 1500)  BP: 106/61 (09/30/22 0701)  SpO2: 97 % (09/30/22 1539)   Vital Signs (24h Range):  Temp:  [98.5 °F (36.9 °C)-98.6 °F (37 °C)] 98.6 °F (37 °C)  Pulse:  [] 61  Resp:  [20-55] 20  SpO2:  [95 %-100 %] 97 %  BP: ()/(49-63) 106/61  Arterial Line BP: (119-130)/(60-63) 130/63     No data found.    Body mass index is 32.58 kg/m².           Telemetry: no events on tele    Physical Exam  Physical Exam  Constitutional:       Appearance: Normal appearance. He is not ill-appearing.   Cardiovascular:      Rate and Rhythm: Normal rate and regular rhythm.      Pulses: Normal pulses.      Heart sounds: Normal heart sounds.   Elevated JVD.  Pulmonary:      Effort: Pulmonary effort is normal. B/l crackles over basal region.  Abdominal:      General: Abdomen is flat. Bowel sounds are normal. There is distension.      Palpations: Abdomen is soft.      Tenderness: There is abdominal tenderness.   Musculoskeletal:         General: No swelling   1+ pitting edema in b/l LE upto knee. Mild tenderness around left upper arm, no fluctuance.  Skin:     General: Skin is warm and dry. Left chest covered by gauze ( postoperative from device extraction)     Neurological:      Mental Status: He is alert and oriented to person, place, and time. Mental status is at baseline.   Psychiatric:         Behavior: Behavior normal.         Thought Content: Thought content normal.       Significant Labs:  CBC:  Recent Labs   Lab 09/29/22  0355 09/29/22  1746 09/30/22  0421   WBC 7.12 17.97* 6.71   RBC 4.11* 4.02* 3.43*   HGB 11.4* 11.0* 9.5*   HCT 35.9* 36.8* 29.2*   PLT SEE COMMENT 452* 313   MCV 87 92 85   MCH 27.7 27.4 27.7   MCHC 31.8* 29.9* 32.5       BNP:  No results for input(s): BNP in the last 168 hours.    Invalid input(s): BNPTRIAGELBLO  CMP:  Recent Labs   Lab 09/29/22  0355 09/29/22  1746 09/30/22  0421   GLU 88 240* 116*   CALCIUM 8.7 8.4* 8.8   ALBUMIN 3.3* 3.3* 3.1*    PROT 6.1 6.7 6.2    134* 134*   K 4.5 5.0 4.9   CO2 23 22* 20*    100 103   BUN 26* 26* 28*   CREATININE 1.6* 1.9* 1.8*   ALKPHOS 72 91 75   ALT 16 16 14   AST 16 17 14   BILITOT 0.5 0.5 0.5        Coagulation:   Recent Labs   Lab 09/25/22  1509 09/26/22  0128 09/30/22  0421   APTT 65.8* 49.8* 26.5       LDH:  No results for input(s): LDH in the last 72 hours.  Microbiology:  Microbiology Results (last 7 days)       Procedure Component Value Units Date/Time    Aerobic culture [[id number]] Collected: 09/27/22 1133    Order Status: Completed Specimen: Abscess from Heart Updated: 09/30/22 1153     Aerobic Bacterial Culture No growth    Narrative:      RA Lead Tip    Aerobic culture [[id number]] Collected: 09/27/22 0952    Order Status: Completed Specimen: Abscess from Chest, Left Updated: 09/30/22 1153     Aerobic Bacterial Culture No growth    Narrative:      Deep Pocket #2    Aerobic culture [[id number]] Collected: 09/27/22 1032    Order Status: Completed Specimen: Abscess from Heart Updated: 09/30/22 1153     Aerobic Bacterial Culture No growth    Narrative:      RA Lead drainage #1    Aerobic culture [[id number]] Collected: 09/27/22 0947    Order Status: Completed Specimen: Abscess from Chest, Left Updated: 09/30/22 1153     Aerobic Bacterial Culture No growth    Narrative:      Deep Pocket #1    Aerobic culture [[id number]] Collected: 09/27/22 1026    Order Status: Completed Specimen: Abscess from Heart Updated: 09/30/22 1153     Aerobic Bacterial Culture No growth    Narrative:      LV Lead Tip    Aerobic culture [[id number]] Collected: 09/27/22 1035    Order Status: Completed Specimen: Abscess from Heart Updated: 09/30/22 1153     Aerobic Bacterial Culture No growth    Narrative:      RA Lead drainage #2    Aerobic culture [[id number]] Collected: 09/27/22 1138    Order Status: Completed Specimen: Abscess from Heart Updated: 09/30/22 1153     Aerobic Bacterial Culture No growth    Narrative:      RV Lead  Tip    Aerobic culture [[id number]] Collected: 09/27/22 0936    Order Status: Completed Specimen: Abscess from Chest, Left Updated: 09/30/22 1153     Aerobic Bacterial Culture No growth    Narrative:      Shallow pocket    Blood culture [[id number]] Collected: 09/30/22 0243    Order Status: Sent Specimen: Blood from Peripheral, Antecubital, Right Updated: 09/30/22 0331    Blood culture [[id number]] Collected: 09/30/22 0241    Order Status: Sent Specimen: Blood from Peripheral, Hand, Right Updated: 09/30/22 0331    Culture, Respiratory with Gram Stain [[id number]]     Order Status: No result Specimen: Respiratory     Culture, Anaerobe [[id number]] Collected: 09/27/22 1138    Order Status: Completed Specimen: Abscess from Heart Updated: 09/29/22 1324     Anaerobic Culture Culture in progress    Narrative:      RV Lead Tip    Culture, Anaerobe [[id number]] Collected: 09/27/22 1035    Order Status: Completed Specimen: Abscess from Heart Updated: 09/29/22 1323     Anaerobic Culture Culture in progress    Narrative:      RA Lead drainage #2    Culture, Anaerobe [[id number]] Collected: 09/27/22 1032    Order Status: Completed Specimen: Abscess from Heart Updated: 09/29/22 1322     Anaerobic Culture Culture in progress    Narrative:      RA Lead drainage #1    Culture, Anaerobe [[id number]] Collected: 09/27/22 0936    Order Status: Completed Specimen: Abscess from Chest, Left Updated: 09/29/22 1322     Anaerobic Culture Culture in progress    Narrative:      Shallow Pocket    Culture, Anaerobe [[id number]] Collected: 09/27/22 1133    Order Status: Completed Specimen: Abscess from Heart Updated: 09/29/22 1321     Anaerobic Culture Culture in progress    Narrative:      RA Lead Tip    Culture, Anaerobe [[id number]] Collected: 09/27/22 1026    Order Status: Completed Specimen: Abscess from Heart Updated: 09/29/22 1320     Anaerobic Culture Culture in progress    Narrative:      LV Lead Tip    Culture, Anaerobe [[id number]] Collected:  09/27/22 0952    Order Status: Completed Specimen: Abscess from Chest, Left Updated: 09/29/22 1320     Anaerobic Culture Culture in progress    Narrative:      Deep Pocket #2    Culture, Anaerobe [[id number]] Collected: 09/27/22 0947    Order Status: Completed Specimen: Abscess from Chest, Left Updated: 09/29/22 1319     Anaerobic Culture Culture in progress    Narrative:      Deep Pocket #1    Culture, Anaerobe [[id number]]     Order Status: No result Specimen: Abscess from Heart     Aerobic culture [[id number]]     Order Status: No result Specimen: Abscess from Heart                 Estimated Creatinine Clearance: 41.8 mL/min (A) (based on SCr of 1.8 mg/dL (H)).    Diagnostic Results:

## 2022-09-30 NOTE — PT/OT/SLP PROGRESS
Occupational Therapy      Patient Name:  Audrey Oniel Jr.   MRN:  [id number]    Initial orders no longer valid as pt t/f to ICU 9/29/22 and currently intubated and sedated. OT to await new orders prior to continued therapy.     9/30/2022

## 2022-09-30 NOTE — PROGRESS NOTES
Pharmacist Renal Dose Adjustment Note    Audrey Oneil Jr. is a 72 y.o. male being treated with the medication famotidine    Patient Data:    Vital Signs (Most Recent):  Temp: 98.5 °F (36.9 °C) (09/29/22 1945)  Pulse: 76 (09/29/22 2015)  Resp: (!) 24 (09/29/22 2015)  BP: (!) 95/50 (09/29/22 2015)  SpO2: 100 % (09/29/22 2015)   Vital Signs (72h Range):  Temp:  [97.4 °F (36.3 °C)-98.9 °F (37.2 °C)]   Pulse:  []   Resp:  [13-38]   BP: ()/(48-67)   SpO2:  [91 %-100 %]   Arterial Line BP: ()/()      Recent Labs   Lab 09/28/22  0441 09/29/22  0355 09/29/22  1746   CREATININE 2.1* 1.6* 1.9*     Serum creatinine: 1.9 mg/dL (H) 09/29/22 1746  Estimated creatinine clearance: 39.6 mL/min (A)    Medication:famotidine dose: 20mg frequency twice a day will be changed to medication:famotidine dose:20mg frequency:daily    Pharmacist's Name: Kevin Layne  Pharmacist's Extension: [phone]

## 2022-09-30 NOTE — CONSULTS
[facility] - Card[facility] Intensive [facility]  Infectious Disease  Consult Note    Patient Name: Audrey Oneil Jr.  MRN: [id number]  Admission Date: 9/14/2022  Hospital Length of Stay: 16 days  Attending Physician: Cherelle Hidalgo DO  Primary Care Provider: Primary Doctor No     Isolation Status: No active isolations    Patient information was obtained from relative(s), past medical records and primary team.      Inpatient consult to Infectious Diseases  Consult performed by: Trinity Domingo MD  Consult ordered by: Benton Rendon MD        Assessment/Plan:     MRSA bacteremia  MRSA bacteremia c/b AV echodensity c/f endocarditis, no lead involvement s/p PM extraction on 9/27. OR cx from 9/27 so far ngtd. Blcx ngtd from 9/18. Blcx repeated 9/30 after transfer to ICU. Remains critically ill, intubated and on pressors.     Recommendations:  -continue daptomycin 10mg/kg IV daily, anticipate 6w from device extraction  -monitor crcl closely   -weekly CK while on dapto                  Acute respiratory failure with hypoxia  Suspected aspiration yesterday with resultant hypoxia s/p intubation. CXR with bilateral infiltrates.     Recommendations:  -continue zosyn pending work up  -will follow up respiratory cultures        Thank you for your consult. I will follow-up with patient. Please contact us if you have any additional questions. Above d/w primary team.     Time: 70 minutes   50% of time spent on face-to-face counseling and coordination of care. Counseling included review of test results, diagnosis, and treatment plan with patient and/or family.        Trinity Domingo MD  Infectious Disease  [facility]    Subjective:     Principal Problem: Chronic combined systolic and diastolic congestive heart failure    HPI: HPI: Patient is a 73yo, male, with PMH of CAD s/p MI, chronic combined systolic and diastolic heart failure on home dobutamine (EF15%; NYHA-3), HTN/HLD, CKD 3, chronic back pain, cardiac  resynch therapy defib (CRT-D), hx of recurrent Vtach and cardiogenic shock (01/2022) at [facility]; which prompted patient re-decision to pursue eval / consideration for LVAD and potential heart transplant recipient. Currently patient case being deferred for transplant recipient selection pending further of recently found pancreatic mass with advanced Imaging study to consider ICD in place; but pt is also still under consideration for DT-LVAD.  --Patient presented to ED (09/14) for new acute onset of central generalized abdominal pain with Right flank pain, headache, nausea, fever (102.2F on arrival), with tachycardia (up to 114bpm on arrival), abdominal distention, SOB requiring 4L home oxygen which began less than 12hrs after eating packaged spaghetti - reports being compliant with at-home medications.     Patient found to have MRSA-bacteremia w/ sepsis criteria on arrival (101.2F, 114bpm); unclear source of infection -- possibly eschar burn wound at left wrist (occurred 4-days ago) vs  PICC line less likley due to absence of signs of infected. Blood cxr x2 (09/14): speciated Staph aureus (ID / susc pending); but MRSA-ID PCR: Positive (09/15).   Patient started on IV-Vanc (2g). Fever reduced with anti-pyretic, stable at 99F, endorses sweats. Remains without leukocytosis.   (09/16) CrCL: 45.1mL/min, up trend from (CrCl:35) day prior 09/15.    Repeat blood cxrs x2 (09/15): GPC-cluster, resembling staph (ID / susc pending)  Repeat blood cxr x2 (09/16): sent --awaiting results.  TTE (09/14): negative for valve vegetations or abscesses; showed left sided atrial and ventricular chamber abnormalities; EF10-15%, LV-diastolic dysfunction; mild mitral regurg.  09/14: Chest-xr: shows increasing pulmonary interstitial edema.  09/14: CT-AP (w/o contrast): No acute abdomen/pelvic abnormality to account for pt hx of pain. showed multiple stable indeterminate pancreatic hypoattenuating lesions. Sigmoid colonic diverticulosis without  diverticulitis or other bowel inflammatory process. Cholelithiasis. Cardiomegaly and trace pericardial fluid.      ID reconsulted on 9/30 for sepsis. Pt admitted to CCU, intubated and on pressors. Per chart review, PICC line was attempted at bedside on 9/29 - pt was anxious and hypoxic with subsequent emergent intubation. CXR with harinder pulm infiltrates with concerns for potential aspiration during hypoxic/anxious episode. Repeat blcx in process. He is currently on daptomycin and zosyn. Pt's sister at bedside.       Past Medical History:   Diagnosis Date    Acute hypoxemic respiratory failure 11/7/2015    Acute idiopathic gout of left knee 12/2/2019    Acute idiopathic gout of right elbow 9/23/2021    AICD (automatic cardioverter/defibrillator) present 12/13/2015      Anticoagulant long-term use     Bronchopneumonia 12/20/2021    CHF (congestive heart failure)     Chronic anticoagulation 5/5/2016    Chronic combined systolic and diastolic heart failure 11/26/2012    EF 10-20% on ECHO 2013    Chronic gout 12/2/2019    Clotting disorder     Coronary artery disease involving native coronary artery of native heart without angina pectoris 11/26/2012    Cath 10- Stents patent non-obstructive disease Cath 11-12015 non-obstructive disease     COVID-19 08/2021    Had antibody infusion    Diverticulosis of colon     DVT (deep venous thrombosis), unspecified laterality 11/12/2015    Dysphonia 1/28/2018    Essential hypertension 11/15/2015    Fine motor impairment 2/2/2021    Hyperlipidemia     Hypertensive heart disease with heart failure 5/5/2016    MI (myocardial infarction) 2009    x's 5    Nicotine abuse     Obesity 11/26/2012    Olecranon bursitis of right elbow 9/19/2021    Pulmonary embolism 2011    Renal disorder     LAVERNE    Right carpal tunnel syndrome 4/6/2018    Stented coronary artery 11/26/2012    LAD stent placed 10/17/2007     Trigger thumb of right hand 4/6/2018        Past Surgical History:   Procedure Laterality Date    APPENDECTOMY      BACK SURGERY      CARDIAC DEFIBRILLATOR PLACEMENT      CARDIAC SURGERY  2007    stent    CARPAL TUNNEL RELEASE Right 04/2018    COLONOSCOPY N/A 8/8/2022    Procedure: COLONOSCOPY;  Surgeon: Sascha Keenan MD;  Location: [facility] ENDO (2ND FLR);  Service: Endoscopy;  Laterality: N/A;  EF-15%  pre heart    AICD-St Rodri       COVID test [facility] 8/5-inst mail-am prep-clears 4 hrs prior-tb    INSERTION OF BIVENTRICULAR IMPLANTABLE CARDIOVERTER-DEFIBRILLATOR (ICD) N/A 5/3/2019    Procedure: INSERTION, ICD, BIVENTRICULAR;  Surgeon: Teofilo Pal MD;  Location: [facility] EP LAB;  Service: Cardiology;  Laterality: N/A;  ICH CM,  ICD UPGD BI-V,  SJM, MAC, FAS, 3PREP (dual ICD INSITU)    r knee scope      REVISION OF SKIN POCKET FOR CARDIOVERTER-DEFIBRILLATOR Left 5/3/2019    Procedure: Revision, Skin Pocket, For Cardioverter-Defibrillator;  Surgeon: Teofilo Pal MD;  Location: [facility] EP LAB;  Service: Cardiology;  Laterality: Left;    RIGHT HEART CATHETERIZATION Right 6/14/2021    Procedure: INSERTION, CATHETER, RIGHT HEART;  Surgeon: Lizz Nieto MD;  Location: [facility] CATH LAB;  Service: Cardiology;  Laterality: Right;    RIGHT HEART CATHETERIZATION Right 6/17/2022    Procedure: INSERTION, CATHETER, RIGHT HEART;  Surgeon: Migue Henry Jr., MD;  Location: [facility] CATH LAB;  Service: Cardiology;  Laterality: Right;    SPINE SURGERY      TONSILLECTOMY      TRIGGER FINGER RELEASE Right 04/2018    thumb       Review of patient's allergies indicates:   Allergen Reactions    Iodine containing multivitamin Swelling     itching    Keflex [cephalexin] Swelling     Eyes.  Tolerated multiple doses of zosyn and 1 dose of augmentin in 2015 and 2016, respectively    Peaches [peach (prunus persica)] Swelling     eyes    Shellfish containing products Swelling    Fig tree Swelling     itching    Tuberculin spenser test ppd Rash        Medications:  Medications Prior to Admission   Medication Sig    allopurinoL (ZYLOPRIM) 100 MG tablet Take 2 tablets (200 mg total) by mouth once daily.    amiodarone (PACERONE) 200 MG Tab TAKE 1 AND 1/2 TABLETS(300 MG) BY MOUTH EVERY DAY    aspirin (ECOTRIN) 81 MG EC tablet Take 1 tablet (81 mg total) by mouth once daily.    atorvastatin (LIPITOR) 80 MG tablet Take 1 tablet (80 mg total) by mouth once daily.    DOBUTamine (DOBUTREX) 500 mg/250 mL (2,000 mcg/mL) 2.5 mcg/kg/min  Patient is 95.7 kg    furosemide (LASIX) 40 MG tablet Take 1 tablet (40 mg total) by mouth daily as needed (Weight gain of 3 lbs in one day or 5 lbs in one week).    HYDROcodone-acetaminophen (NORCO)  mg per tablet Take 1 tablet by mouth every 6 (six) hours.    JARDIANCE 25 mg tablet Take 1 tablet (25 mg total) by mouth once daily.    ondansetron (ZOFRAN-ODT) 4 MG TbDL Dissolve 1 tablet (4 mg total) by mouth every 6 (six) hours as needed (nausea).    polyethylene glycol (GOLYTELY) 236-22.74-6.74 -5.86 gram suspension SMARTSIG:Milliliter(s) By Mouth    sacubitriL-valsartan (ENTRESTO) 49-51 mg per tablet Take 1 tablet by mouth 2 (two) times daily.    simethicone (MYLICON) 80 MG chewable tablet Take 1 tablet (80 mg total) by mouth every 6 (six) hours as needed for Flatulence.    spironolactone (ALDACTONE) 25 MG tablet Take 1 tablet (25 mg total) by mouth once daily.     Antibiotics (From admission, onward)      Start     Stop Route Frequency Ordered    09/30/22 1130  piperacillin-tazobactam 4.5 g in sodium chloride 0.9% 100 mL IVPB (ready to mix system)         -- IV Every 8 hours (non-standard times) 09/30/22 1027    09/30/22 0900  DAPTOmycin (CUBICIN) 945 mg in sodium chloride 0.9% 50 mL IVPB         -- IV Every 24 hours (non-standard times) 09/29/22 1516          Antifungals (From admission, onward)      None          Antivirals (From admission, onward)      None             Immunization History   Administered Date(s)  Administered    Hepatitis A, Adult 2005    Influenza 11/15/2002    Influenza (FLUAD) - Quadrivalent - Adjuvanted - PF *Preferred* (65+) 12/15/2021    Influenza - High Dose - PF (65 years and older) 10/12/2017, 2019    Influenza Split 2005    Pneumococcal Conjugate - 13 Valent 2017    Pneumococcal Polysaccharide - 23 Valent 2014, 2019    Td (Adult), Unspecified Formulation 2005    Tdap 2016    Zoster 10/20/2017       Family History       Problem Relation (Age of Onset)    Cancer Mother, Paternal Grandmother    Colon polyps Father    Diabetes Mother    Heart disease Mother, Father    No Known Problems Sister, Daughter, Son, Sister, Son    Stroke Father          Social History     Socioeconomic History    Marital status:     Number of children: 2   Occupational History    Occupation: [employer]     Comment: unemployed   Tobacco Use    Smoking status: Former     Packs/day: 1.00     Years: 45.00     Pack years: 45.00     Types: Cigarettes     Quit date: 2005     Years since quittin.8    Smokeless tobacco: Never    Tobacco comments:     1-1.5 ppd every day.   Substance and Sexual Activity    Alcohol use: No    Drug use: No    Sexual activity: Not Currently     Review of Systems   Unable to perform ROS: Intubated   Objective:     Vital Signs (Most Recent):  Temp: 98.6 °F (37 °C) (22 0701)  Pulse: 63 (22 1100)  Resp: 20 (22 1100)  BP: 106/61 (22 0701)  SpO2: 96 % (22 1100) Vital Signs (24h Range):  Temp:  [98.5 °F (36.9 °C)-98.6 °F (37 °C)] 98.6 °F (37 °C)  Pulse:  [] 63  Resp:  [16-55] 20  SpO2:  [95 %-100 %] 96 %  BP: ()/(49-63) 106/61  Arterial Line BP: (119-130)/(60-63) 130/63     Weight: 94.3 kg (208 lb)  Body mass index is 32.58 kg/m².    Estimated Creatinine Clearance: 41.8 mL/min (A) (based on SCr of 1.8 mg/dL (H)).    Physical Exam  Constitutional:       General: He is not in acute  distress.     Appearance: He is ill-appearing. He is not toxic-appearing or diaphoretic.   HENT:      Head: Normocephalic and atraumatic.      Right Ear: External ear normal.      Left Ear: External ear normal.      Mouth/Throat:      Comments: ETT  Eyes:      General:         Right eye: No discharge.         Left eye: No discharge.   Cardiovascular:      Rate and Rhythm: Normal rate and regular rhythm.   Pulmonary:      Effort: Pulmonary effort is normal. No respiratory distress.      Breath sounds: No wheezing, rhonchi or rales.   Abdominal:      General: Bowel sounds are normal. There is no distension.      Palpations: Abdomen is soft.      Tenderness: There is no abdominal tenderness. There is no guarding.   Genitourinary:     Comments: valentin  Musculoskeletal:      Right lower leg: No edema.      Left lower leg: No edema.   Skin:     General: Skin is warm and dry.      Findings: No erythema or rash.      Comments: Prior PM site - dressed   Neurological:      Comments: sedated       Significant Labs:   Microbiology Results (last 7 days)       Procedure Component Value Units Date/Time    Aerobic culture [[id number]] Collected: 09/27/22 1133    Order Status: Completed Specimen: Abscess from Heart Updated: 09/30/22 1153     Aerobic Bacterial Culture No growth    Narrative:      RA Lead Tip    Aerobic culture [[id number]] Collected: 09/27/22 0952    Order Status: Completed Specimen: Abscess from Chest, Left Updated: 09/30/22 1153     Aerobic Bacterial Culture No growth    Narrative:      Deep Pocket #2    Aerobic culture [[id number]] Collected: 09/27/22 1032    Order Status: Completed Specimen: Abscess from Heart Updated: 09/30/22 1153     Aerobic Bacterial Culture No growth    Narrative:      RA Lead drainage #1    Aerobic culture [[id number]] Collected: 09/27/22 0947    Order Status: Completed Specimen: Abscess from Chest, Left Updated: 09/30/22 1153     Aerobic Bacterial Culture No growth    Narrative:      Deep  Pocket #1    Aerobic culture [[id number]] Collected: 09/27/22 1026    Order Status: Completed Specimen: Abscess from Heart Updated: 09/30/22 1153     Aerobic Bacterial Culture No growth    Narrative:      LV Lead Tip    Aerobic culture [[id number]] Collected: 09/27/22 1035    Order Status: Completed Specimen: Abscess from Heart Updated: 09/30/22 1153     Aerobic Bacterial Culture No growth    Narrative:      RA Lead drainage #2    Aerobic culture [[id number]] Collected: 09/27/22 1138    Order Status: Completed Specimen: Abscess from Heart Updated: 09/30/22 1153     Aerobic Bacterial Culture No growth    Narrative:      RV Lead Tip    Aerobic culture [[id number]] Collected: 09/27/22 0936    Order Status: Completed Specimen: Abscess from Chest, Left Updated: 09/30/22 1153     Aerobic Bacterial Culture No growth    Narrative:      Shallow pocket    Blood culture [[id number]] Collected: 09/30/22 0243    Order Status: Sent Specimen: Blood from Peripheral, Antecubital, Right Updated: 09/30/22 0331    Blood culture [[id number]] Collected: 09/30/22 0241    Order Status: Sent Specimen: Blood from Peripheral, Hand, Right Updated: 09/30/22 0331    Culture, Respiratory with Gram Stain [[id number]]     Order Status: No result Specimen: Respiratory     Culture, Anaerobe [[id number]] Collected: 09/27/22 1138    Order Status: Completed Specimen: Abscess from Heart Updated: 09/29/22 1324     Anaerobic Culture Culture in progress    Narrative:      RV Lead Tip    Culture, Anaerobe [[id number]] Collected: 09/27/22 1035    Order Status: Completed Specimen: Abscess from Heart Updated: 09/29/22 1323     Anaerobic Culture Culture in progress    Narrative:      RA Lead drainage #2    Culture, Anaerobe [[id number]] Collected: 09/27/22 1032    Order Status: Completed Specimen: Abscess from Heart Updated: 09/29/22 1322     Anaerobic Culture Culture in progress    Narrative:      RA Lead drainage #1    Culture, Anaerobe [[id number]] Collected: 09/27/22  0936    Order Status: Completed Specimen: Abscess from Chest, Left Updated: 09/29/22 1322     Anaerobic Culture Culture in progress    Narrative:      Shallow Pocket    Culture, Anaerobe [[id number]] Collected: 09/27/22 1133    Order Status: Completed Specimen: Abscess from Heart Updated: 09/29/22 1321     Anaerobic Culture Culture in progress    Narrative:      RA Lead Tip    Culture, Anaerobe [[id number]] Collected: 09/27/22 1026    Order Status: Completed Specimen: Abscess from Heart Updated: 09/29/22 1320     Anaerobic Culture Culture in progress    Narrative:      LV Lead Tip    Culture, Anaerobe [[id number]] Collected: 09/27/22 0952    Order Status: Completed Specimen: Abscess from Chest, Left Updated: 09/29/22 1320     Anaerobic Culture Culture in progress    Narrative:      Deep Pocket #2    Culture, Anaerobe [[id number]] Collected: 09/27/22 0947    Order Status: Completed Specimen: Abscess from Chest, Left Updated: 09/29/22 1319     Anaerobic Culture Culture in progress    Narrative:      Deep Pocket #1    Culture, Anaerobe [[id number]]     Order Status: No result Specimen: Abscess from Heart     Aerobic culture [[id number]]     Order Status: No result Specimen: Abscess from Heart     Blood culture [[id number]] Collected: 09/18/22 1117    Order Status: Completed Specimen: Blood from Peripheral, Right Hand Updated: 09/23/22 1412     Blood Culture, Routine No growth after 5 days.            Significant Imaging: I have reviewed all pertinent imaging results/findings within the past 24 hours.

## 2022-10-01 PROBLEM — Z99.11 ENCOUNTER FOR WEANING FROM VENTILATOR: Status: ACTIVE | Noted: 2022-01-01

## 2022-10-01 PROBLEM — R57.8: Status: ACTIVE | Noted: 2022-01-01

## 2022-10-01 NOTE — ASSESSMENT & PLAN NOTE
Unclear source of bacteremia however potential sources include CVC which has been removed, and translocation from skin in the setting of multiple skin eschars.  Patient is s/p cardiac device removal.  Continue IV daptomycin.

## 2022-10-01 NOTE — SUBJECTIVE & OBJECTIVE
Interval History:     Patient nods to questions.    Review of Systems   Unable to perform ROS: Intubated   Objective:     Vital Signs (Most Recent):  Temp: 98.2 °F (36.8 °C) (10/01/22 1101)  Pulse: 78 (10/01/22 1401)  Resp: (!) 43 (10/01/22 1401)  BP: 119/61 (10/01/22 1301)  SpO2: 98 % (10/01/22 1401)   Vital Signs (24h Range):  Temp:  [97.3 °F (36.3 °C)-98.2 °F (36.8 °C)] 98.2 °F (36.8 °C)  Pulse:  [55-78] 78  Resp:  [12-43] 43  SpO2:  [97 %-100 %] 98 %  BP: ()/(54-74) 119/61  Arterial Line BP: ()/(46-63) 112/46     Weight: 94.3 kg (208 lb)  Body mass index is 32.58 kg/m².    Estimated Creatinine Clearance: 37.6 mL/min (A) (based on SCr of 2 mg/dL (H)).    Physical Exam  Constitutional:       General: He is not in acute distress.     Appearance: He is ill-appearing. He is not toxic-appearing or diaphoretic.   HENT:      Head: Normocephalic and atraumatic.      Right Ear: External ear normal.      Left Ear: External ear normal.      Mouth/Throat:      Comments: ETT  Eyes:      General:         Right eye: No discharge.         Left eye: No discharge.   Cardiovascular:      Rate and Rhythm: Normal rate and regular rhythm.   Pulmonary:      Effort: Pulmonary effort is normal. No respiratory distress.      Breath sounds: No wheezing, rhonchi or rales.   Abdominal:      General: Bowel sounds are normal. There is no distension.      Palpations: Abdomen is soft.      Tenderness: There is no abdominal tenderness. There is no guarding.   Genitourinary:     Comments: valentin  Musculoskeletal:      Right lower leg: No edema.      Left lower leg: No edema.   Skin:     General: Skin is warm and dry.      Findings: No erythema or rash.      Comments: Prior PM site - dressed       Significant Labs:   Microbiology Results (last 7 days)       Procedure Component Value Units Date/Time    Culture, Respiratory with Gram Stain [[id number]] Collected: 10/01/22 1041    Order Status: Completed Specimen: Respiratory from  Endotracheal Aspirate Updated: 10/01/22 1431     Gram Stain (Respiratory) <10 epithelial cells per low power field.     Gram Stain (Respiratory) Rare WBC's     Gram Stain (Respiratory) No organisms seen    Aerobic culture [[id number]] Collected: 09/27/22 1133    Order Status: Completed Specimen: Abscess from Heart Updated: 09/30/22 1153     Aerobic Bacterial Culture No growth    Narrative:      RA Lead Tip    Aerobic culture [[id number]] Collected: 09/27/22 0952    Order Status: Completed Specimen: Abscess from Chest, Left Updated: 09/30/22 1153     Aerobic Bacterial Culture No growth    Narrative:      Deep Pocket #2    Aerobic culture [[id number]] Collected: 09/27/22 1032    Order Status: Completed Specimen: Abscess from Heart Updated: 09/30/22 1153     Aerobic Bacterial Culture No growth    Narrative:      RA Lead drainage #1    Aerobic culture [[id number]] Collected: 09/27/22 0947    Order Status: Completed Specimen: Abscess from Chest, Left Updated: 09/30/22 1153     Aerobic Bacterial Culture No growth    Narrative:      Deep Pocket #1    Aerobic culture [[id number]] Collected: 09/27/22 1026    Order Status: Completed Specimen: Abscess from Heart Updated: 09/30/22 1153     Aerobic Bacterial Culture No growth    Narrative:      LV Lead Tip    Aerobic culture [[id number]] Collected: 09/27/22 1035    Order Status: Completed Specimen: Abscess from Heart Updated: 09/30/22 1153     Aerobic Bacterial Culture No growth    Narrative:      RA Lead drainage #2    Aerobic culture [[id number]] Collected: 09/27/22 1138    Order Status: Completed Specimen: Abscess from Heart Updated: 09/30/22 1153     Aerobic Bacterial Culture No growth    Narrative:      RV Lead Tip    Aerobic culture [[id number]] Collected: 09/27/22 0936    Order Status: Completed Specimen: Abscess from Chest, Left Updated: 09/30/22 1153     Aerobic Bacterial Culture No growth    Narrative:      Shallow pocket    Blood culture [[id number]] Collected: 09/30/22 0243     Order Status: Sent Specimen: Blood from Peripheral, Antecubital, Right Updated: 09/30/22 0331    Blood culture [[id number]] Collected: 09/30/22 0241    Order Status: Sent Specimen: Blood from Peripheral, Hand, Right Updated: 09/30/22 0331    Culture, Anaerobe [[id number]] Collected: 09/27/22 1138    Order Status: Completed Specimen: Abscess from Heart Updated: 09/29/22 1324     Anaerobic Culture Culture in progress    Narrative:      RV Lead Tip    Culture, Anaerobe [[id number]] Collected: 09/27/22 1035    Order Status: Completed Specimen: Abscess from Heart Updated: 09/29/22 1323     Anaerobic Culture Culture in progress    Narrative:      RA Lead drainage #2    Culture, Anaerobe [[id number]] Collected: 09/27/22 1032    Order Status: Completed Specimen: Abscess from Heart Updated: 09/29/22 1322     Anaerobic Culture Culture in progress    Narrative:      RA Lead drainage #1    Culture, Anaerobe [[id number]] Collected: 09/27/22 0936    Order Status: Completed Specimen: Abscess from Chest, Left Updated: 09/29/22 1322     Anaerobic Culture Culture in progress    Narrative:      Shallow Pocket    Culture, Anaerobe [[id number]] Collected: 09/27/22 1133    Order Status: Completed Specimen: Abscess from Heart Updated: 09/29/22 1321     Anaerobic Culture Culture in progress    Narrative:      RA Lead Tip    Culture, Anaerobe [[id number]] Collected: 09/27/22 1026    Order Status: Completed Specimen: Abscess from Heart Updated: 09/29/22 1320     Anaerobic Culture Culture in progress    Narrative:      LV Lead Tip    Culture, Anaerobe [[id number]] Collected: 09/27/22 0952    Order Status: Completed Specimen: Abscess from Chest, Left Updated: 09/29/22 1320     Anaerobic Culture Culture in progress    Narrative:      Deep Pocket #2    Culture, Anaerobe [[id number]] Collected: 09/27/22 0947    Order Status: Completed Specimen: Abscess from Chest, Left Updated: 09/29/22 1319     Anaerobic Culture Culture in progress    Narrative:       Deep Pocket #1    Culture, Anaerobe [[id number]]     Order Status: No result Specimen: Abscess from Heart     Aerobic culture [[id number]]     Order Status: No result Specimen: Abscess from Heart             Significant Imaging: I have reviewed all pertinent imaging results/findings within the past 24 hours.

## 2022-10-01 NOTE — PROGRESS NOTES
[facility]  Infectious Disease  Progress Note    Patient Name: Audrey Oneil Jr.  MRN: [id number]  Admission Date: 9/14/2022  Length of Stay: 17 days  Attending Physician: Cherelle Hidalgo DO  Primary Care Provider: Primary Doctor No    Isolation Status: No active isolations  Assessment/Plan:      Bacteremia due to methicillin resistant Staphylococcus aureus  Unclear source of bacteremia however potential sources include CVC which has been removed, and translocation from skin in the setting of multiple skin eschars.  Patient is s/p cardiac device removal.  Continue IV daptomycin.    Acute hypoxemic respiratory failure  Possible aspiration.  Zosyn started.  Anticipate 5 days of IV zosyn.        Anticipated Disposition: TBD    Thank you for your consult. I will follow-up with patient. Please contact us if you have any additional questions.    Demarcus Russell MD  Infectious Disease  [facility]    Subjective:     Principal Problem:Chronic combined systolic and diastolic congestive heart failure    HPI: HPI: Patient is a 73yo, male, with PMH of CAD s/p MI, chronic combined systolic and diastolic heart failure on home dobutamine (EF15%; NYHA-3), HTN/HLD, CKD 3, chronic back pain, cardiac resynch therapy defib (CRT-D), hx of recurrent Vtach and cardiogenic shock (01/2022) at [facility]; which prompted patient re-decision to pursue eval / consideration for LVAD and potential heart transplant recipient. Currently patient case being deferred for transplant recipient selection pending further of recently found pancreatic mass with advanced Imaging study to consider ICD in place; but pt is also still under consideration for DT-LVAD.  --Patient presented to ED (09/14) for new acute onset of central generalized abdominal pain with Right flank pain, headache, nausea, fever (102.2F on arrival), with tachycardia (up to 114bpm on arrival), abdominal distention, SOB requiring 4L home oxygen  which began less than 12hrs after eating packaged spaghetti - reports being compliant with at-home medications.     Patient found to have MRSA-bacteremia w/ sepsis criteria on arrival (101.2F, 114bpm); unclear source of infection -- possibly eschar burn wound at left wrist (occurred 4-days ago) vs  PICC line less likley due to absence of signs of infected. Blood cxr x2 (09/14): speciated Staph aureus (ID / susc pending); but MRSA-ID PCR: Positive (09/15).   Patient started on IV-Vanc (2g). Fever reduced with anti-pyretic, stable at 99F, endorses sweats. Remains without leukocytosis.   (09/16) CrCL: 45.1mL/min, up trend from (CrCl:35) day prior 09/15.    Repeat blood cxrs x2 (09/15): GPC-cluster, resembling staph (ID / susc pending)  Repeat blood cxr x2 (09/16): sent --awaiting results.  TTE (09/14): negative for valve vegetations or abscesses; showed left sided atrial and ventricular chamber abnormalities; EF10-15%, LV-diastolic dysfunction; mild mitral regurg.  09/14: Chest-xr: shows increasing pulmonary interstitial edema.  09/14: CT-AP (w/o contrast): No acute abdomen/pelvic abnormality to account for pt hx of pain. showed multiple stable indeterminate pancreatic hypoattenuating lesions. Sigmoid colonic diverticulosis without diverticulitis or other bowel inflammatory process. Cholelithiasis. Cardiomegaly and trace pericardial fluid.      ID reconsulted on 9/30 for sepsis. Pt admitted to CCU, intubated and on pressors. Per chart review, PICC line was attempted at bedside on 9/29 - pt was anxious and hypoxic with subsequent emergent intubation. CXR with harinder pulm infiltrates with concerns for potential aspiration during hypoxic/anxious episode. Repeat blcx in process. He is currently on daptomycin and zosyn.     Interval History:     Patient nods to questions.    Review of Systems   Unable to perform ROS: Intubated   Objective:     Vital Signs (Most Recent):  Temp: 98.2 °F (36.8 °C) (10/01/22 1101)  Pulse: 78  (10/01/22 1401)  Resp: (!) 43 (10/01/22 1401)  BP: 119/61 (10/01/22 1301)  SpO2: 98 % (10/01/22 1401)   Vital Signs (24h Range):  Temp:  [97.3 °F (36.3 °C)-98.2 °F (36.8 °C)] 98.2 °F (36.8 °C)  Pulse:  [55-78] 78  Resp:  [12-43] 43  SpO2:  [97 %-100 %] 98 %  BP: ()/(54-74) 119/61  Arterial Line BP: ()/(46-63) 112/46     Weight: 94.3 kg (208 lb)  Body mass index is 32.58 kg/m².    Estimated Creatinine Clearance: 37.6 mL/min (A) (based on SCr of 2 mg/dL (H)).    Physical Exam  Constitutional:       General: He is not in acute distress.     Appearance: He is ill-appearing. He is not toxic-appearing or diaphoretic.   HENT:      Head: Normocephalic and atraumatic.      Right Ear: External ear normal.      Left Ear: External ear normal.      Mouth/Throat:      Comments: ETT  Eyes:      General:         Right eye: No discharge.         Left eye: No discharge.   Cardiovascular:      Rate and Rhythm: Normal rate and regular rhythm.   Pulmonary:      Effort: Pulmonary effort is normal. No respiratory distress.      Breath sounds: No wheezing, rhonchi or rales.   Abdominal:      General: Bowel sounds are normal. There is no distension.      Palpations: Abdomen is soft.      Tenderness: There is no abdominal tenderness. There is no guarding.   Genitourinary:     Comments: valentin  Musculoskeletal:      Right lower leg: No edema.      Left lower leg: No edema.   Skin:     General: Skin is warm and dry.      Findings: No erythema or rash.      Comments: Prior PM site - dressed       Significant Labs:   Microbiology Results (last 7 days)       Procedure Component Value Units Date/Time    Culture, Respiratory with Gram Stain [[id number]] Collected: 10/01/22 1041    Order Status: Completed Specimen: Respiratory from Endotracheal Aspirate Updated: 10/01/22 1431     Gram Stain (Respiratory) <10 epithelial cells per low power field.     Gram Stain (Respiratory) Rare WBC's     Gram Stain (Respiratory) No organisms seen     Aerobic culture [[id number]] Collected: 09/27/22 1133    Order Status: Completed Specimen: Abscess from Heart Updated: 09/30/22 1153     Aerobic Bacterial Culture No growth    Narrative:      RA Lead Tip    Aerobic culture [[id number]] Collected: 09/27/22 0952    Order Status: Completed Specimen: Abscess from Chest, Left Updated: 09/30/22 1153     Aerobic Bacterial Culture No growth    Narrative:      Deep Pocket #2    Aerobic culture [[id number]] Collected: 09/27/22 1032    Order Status: Completed Specimen: Abscess from Heart Updated: 09/30/22 1153     Aerobic Bacterial Culture No growth    Narrative:      RA Lead drainage #1    Aerobic culture [[id number]] Collected: 09/27/22 0947    Order Status: Completed Specimen: Abscess from Chest, Left Updated: 09/30/22 1153     Aerobic Bacterial Culture No growth    Narrative:      Deep Pocket #1    Aerobic culture [[id number]] Collected: 09/27/22 1026    Order Status: Completed Specimen: Abscess from Heart Updated: 09/30/22 1153     Aerobic Bacterial Culture No growth    Narrative:      LV Lead Tip    Aerobic culture [[id number]] Collected: 09/27/22 1035    Order Status: Completed Specimen: Abscess from Heart Updated: 09/30/22 1153     Aerobic Bacterial Culture No growth    Narrative:      RA Lead drainage #2    Aerobic culture [[id number]] Collected: 09/27/22 1138    Order Status: Completed Specimen: Abscess from Heart Updated: 09/30/22 1153     Aerobic Bacterial Culture No growth    Narrative:      RV Lead Tip    Aerobic culture [[id number]] Collected: 09/27/22 0936    Order Status: Completed Specimen: Abscess from Chest, Left Updated: 09/30/22 1153     Aerobic Bacterial Culture No growth    Narrative:      Shallow pocket    Blood culture [[id number]] Collected: 09/30/22 0243    Order Status: Sent Specimen: Blood from Peripheral, Antecubital, Right Updated: 09/30/22 0331    Blood culture [[id number]] Collected: 09/30/22 0241    Order Status: Sent Specimen: Blood from Peripheral,  Hand, Right Updated: 09/30/22 0331    Culture, Anaerobe [[id number]] Collected: 09/27/22 1138    Order Status: Completed Specimen: Abscess from Heart Updated: 09/29/22 1324     Anaerobic Culture Culture in progress    Narrative:      RV Lead Tip    Culture, Anaerobe [[id number]] Collected: 09/27/22 1035    Order Status: Completed Specimen: Abscess from Heart Updated: 09/29/22 1323     Anaerobic Culture Culture in progress    Narrative:      RA Lead drainage #2    Culture, Anaerobe [[id number]] Collected: 09/27/22 1032    Order Status: Completed Specimen: Abscess from Heart Updated: 09/29/22 1322     Anaerobic Culture Culture in progress    Narrative:      RA Lead drainage #1    Culture, Anaerobe [[id number]] Collected: 09/27/22 0936    Order Status: Completed Specimen: Abscess from Chest, Left Updated: 09/29/22 1322     Anaerobic Culture Culture in progress    Narrative:      Shallow Pocket    Culture, Anaerobe [[id number]] Collected: 09/27/22 1133    Order Status: Completed Specimen: Abscess from Heart Updated: 09/29/22 1321     Anaerobic Culture Culture in progress    Narrative:      RA Lead Tip    Culture, Anaerobe [[id number]] Collected: 09/27/22 1026    Order Status: Completed Specimen: Abscess from Heart Updated: 09/29/22 1320     Anaerobic Culture Culture in progress    Narrative:      LV Lead Tip    Culture, Anaerobe [[id number]] Collected: 09/27/22 0952    Order Status: Completed Specimen: Abscess from Chest, Left Updated: 09/29/22 1320     Anaerobic Culture Culture in progress    Narrative:      Deep Pocket #2    Culture, Anaerobe [[id number]] Collected: 09/27/22 0947    Order Status: Completed Specimen: Abscess from Chest, Left Updated: 09/29/22 1319     Anaerobic Culture Culture in progress    Narrative:      Deep Pocket #1    Culture, Anaerobe [[id number]]     Order Status: No result Specimen: Abscess from Heart     Aerobic culture [[id number]]     Order Status: No result Specimen: Abscess from Heart              Significant Imaging: I have reviewed all pertinent imaging results/findings within the past 24 hours.

## 2022-10-01 NOTE — NURSING
0800 Prop off. PT awake and following commands. SBT started.     1300 Pt extubated to 6L High flow NC    1320 MAP goal> 60 per Reji SMITH    1500 Bedside swallow eval performed and passed.

## 2022-10-01 NOTE — PROGRESS NOTES
[facility]  Heart Transplant  Progress Note    Patient Name: Audrey Oneil Jr.  MRN: [id number]  Admission Date: 9/14/2022  Hospital Length of Stay: 17 days  Attending Physician: Cherelle Hidalgo DO  Primary Care Provider: Primary Doctor No  Principal Problem:Chronic combined systolic and diastolic congestive heart failure    Subjective:     Interval History: Pt extubated today to NC. Pt is responsive and following commands.Pt received Lasix 80 IV once.     Hemodynamics     SVO2: 62  CVP: 4  CO: 6.8  CI: 3.2  SVR: 1058  I: 1.1 O: 3.8       Continuous Infusions:   sodium chloride 0.9% Stopped (09/28/22 1550)    sodium chloride 0.9% Stopped (10/01/22 1257)    dexmedetomidine (PRECEDEX) infusion Stopped (10/01/22 0951)    DOBUTamine IV infusion (non-titrating) 2.5 mcg/kg/min (10/01/22 1401)    EPINEPHrine Stopped (09/30/22 0207)    heparin (porcine) in D5W      propofoL Stopped (10/01/22 0755)    vasopressin 0.04 Units/min (10/01/22 1401)     Scheduled Meds:   acetaminophen  650 mg Oral QID    allopurinoL  200 mg Oral Daily    amiodarone  300 mg Oral Daily    aspirin  81 mg Oral Daily    atorvastatin  80 mg Oral Daily    DAPTOmycin (CUBICIN) IV (PEDS and ADULTS)  10 mg/kg Intravenous Q24H    famotidine (PF)  20 mg Intravenous Daily    heparin (PORCINE)  60 Units/kg (Adjusted) Intravenous Once    LIDOcaine  1 patch Transdermal Q24H    magnesium sulfate IVPB  2 g Intravenous Once    piperacillin-tazobactam (ZOSYN) IVPB  4.5 g Intravenous Q8H    polyethylene glycol  17 g Oral Daily     PRN Meds:sodium chloride, acetaminophen, dextromethorphan-guaiFENesin  mg, heparin (PORCINE), heparin (PORCINE), HYDROcodone-acetaminophen, methocarbamoL, ondansetron, senna-docusate 8.6-50 mg, sodium chloride 0.9%    Review of patient's allergies indicates:   Allergen Reactions    Iodine containing multivitamin Swelling     itching    Keflex [cephalexin] Swelling     Eyes.  Tolerated  multiple doses of zosyn and 1 dose of augmentin in 2015 and 2016, respectively    Peaches [peach (prunus persica)] Swelling     eyes    Shellfish containing products Swelling    Fig tree Swelling     itching    Tuberculin spenser test ppd Rash     Objective:     Vital Signs (Most Recent):  Temp: 98.2 °F (36.8 °C) (10/01/22 1101)  Pulse: 78 (10/01/22 1401)  Resp: (!) 43 (10/01/22 1401)  BP: 119/61 (10/01/22 1301)  SpO2: 98 % (10/01/22 1401)   Vital Signs (24h Range):  Temp:  [97.3 °F (36.3 °C)-98.2 °F (36.8 °C)] 98.2 °F (36.8 °C)  Pulse:  [55-78] 78  Resp:  [12-43] 43  SpO2:  [97 %-100 %] 98 %  BP: ()/(54-74) 119/61  Arterial Line BP: ()/(46-63) 112/46     No data found.  Body mass index is 32.58 kg/m².      Intake/Output Summary (Last 24 hours) at 10/1/2022 1525  Last data filed at 10/1/2022 1401  Gross per 24 hour   Intake 1274.65 ml   Output 3510 ml   Net -2235.35 ml         Physical Exam  Constitutional:       General: He is not in acute distress.     Appearance: Normal appearance. He is normal weight. He is not ill-appearing or toxic-appearing.   HENT:      Head: Normocephalic and atraumatic.      Nose: Nose normal. No congestion.      Mouth/Throat:      Mouth: Mucous membranes are moist.      Pharynx: No oropharyngeal exudate.   Eyes:      General:         Right eye: No discharge.         Left eye: No discharge.      Extraocular Movements: Extraocular movements intact.      Pupils: Pupils are equal, round, and reactive to light.   Cardiovascular:      Rate and Rhythm: Normal rate and regular rhythm.      Heart sounds: No murmur heard.    No friction rub. No gallop.   Pulmonary:      Effort: Pulmonary effort is normal. No respiratory distress.      Breath sounds: Normal breath sounds. No wheezing, rhonchi or rales.   Abdominal:      General: Abdomen is flat. Bowel sounds are normal. There is no distension.      Palpations: Abdomen is soft.      Tenderness: There is no abdominal tenderness.    Musculoskeletal:         General: No swelling. Normal range of motion.      Cervical back: Normal range of motion. No rigidity.      Right lower leg: No edema.      Left lower leg: No edema.   Skin:     General: Skin is warm and dry.      Findings: No lesion or rash.   Neurological:      General: No focal deficit present.      Mental Status: He is alert and oriented to person, place, and time.      Cranial Nerves: No cranial nerve deficit.      Motor: No weakness.       Significant Labs:  CBC:  Recent Labs   Lab 09/29/22  1746 09/30/22  0421 10/01/22  0358   WBC 17.97* 6.71 6.56   RBC 4.02* 3.43* 3.37*   HGB 11.0* 9.5* 9.4*   HCT 36.8* 29.2* 28.6*   * 313 281   MCV 92 85 85   MCH 27.4 27.7 27.9   MCHC 29.9* 32.5 32.9     BNP:  No results for input(s): BNP in the last 168 hours.    Invalid input(s): BNPTRIAGELBLO  CMP:  Recent Labs   Lab 09/30/22  0421 10/01/22  0358 10/01/22  1139   * 129* 100   CALCIUM 8.8 8.9 8.8   ALBUMIN 3.1* 3.1* 3.0*   PROT 6.2 6.5 6.5   * 129* 134*   K 4.9 3.4* 3.2*   CO2 20* 17* 19*    96 98   BUN 28* 31* 33*   CREATININE 1.8* 1.7* 2.0*   ALKPHOS 75 79 80   ALT 14 12 13   AST 14 14 13   BILITOT 0.5 0.7 0.9      Coagulation:   Recent Labs   Lab 09/25/22  1509 09/26/22  0128 09/30/22 0421   APTT 65.8* 49.8* 26.5     LDH:  No results for input(s): LDH in the last 72 hours.  Microbiology:  Microbiology Results (last 7 days)       Procedure Component Value Units Date/Time    Culture, Respiratory with Gram Stain [[id number]] Collected: 10/01/22 1041    Order Status: Completed Specimen: Respiratory from Endotracheal Aspirate Updated: 10/01/22 1431     Gram Stain (Respiratory) <10 epithelial cells per low power field.     Gram Stain (Respiratory) Rare WBC's     Gram Stain (Respiratory) No organisms seen    Aerobic culture [[id number]] Collected: 09/27/22 1133    Order Status: Completed Specimen: Abscess from Heart Updated: 09/30/22 1153     Aerobic Bacterial Culture No  growth    Narrative:      RA Lead Tip    Aerobic culture [[id number]] Collected: 09/27/22 0952    Order Status: Completed Specimen: Abscess from Chest, Left Updated: 09/30/22 1153     Aerobic Bacterial Culture No growth    Narrative:      Deep Pocket #2    Aerobic culture [[id number]] Collected: 09/27/22 1032    Order Status: Completed Specimen: Abscess from Heart Updated: 09/30/22 1153     Aerobic Bacterial Culture No growth    Narrative:      RA Lead drainage #1    Aerobic culture [[id number]] Collected: 09/27/22 0947    Order Status: Completed Specimen: Abscess from Chest, Left Updated: 09/30/22 1153     Aerobic Bacterial Culture No growth    Narrative:      Deep Pocket #1    Aerobic culture [[id number]] Collected: 09/27/22 1026    Order Status: Completed Specimen: Abscess from Heart Updated: 09/30/22 1153     Aerobic Bacterial Culture No growth    Narrative:      LV Lead Tip    Aerobic culture [[id number]] Collected: 09/27/22 1035    Order Status: Completed Specimen: Abscess from Heart Updated: 09/30/22 1153     Aerobic Bacterial Culture No growth    Narrative:      RA Lead drainage #2    Aerobic culture [[id number]] Collected: 09/27/22 1138    Order Status: Completed Specimen: Abscess from Heart Updated: 09/30/22 1153     Aerobic Bacterial Culture No growth    Narrative:      RV Lead Tip    Aerobic culture [[id number]] Collected: 09/27/22 0936    Order Status: Completed Specimen: Abscess from Chest, Left Updated: 09/30/22 1153     Aerobic Bacterial Culture No growth    Narrative:      Shallow pocket    Blood culture [[id number]] Collected: 09/30/22 0243    Order Status: Sent Specimen: Blood from Peripheral, Antecubital, Right Updated: 09/30/22 0331    Blood culture [[id number]] Collected: 09/30/22 0241    Order Status: Sent Specimen: Blood from Peripheral, Hand, Right Updated: 09/30/22 0331    Culture, Anaerobe [[id number]] Collected: 09/27/22 1138    Order Status: Completed Specimen: Abscess from Heart Updated: 09/29/22  1324     Anaerobic Culture Culture in progress    Narrative:      RV Lead Tip    Culture, Anaerobe [[id number]] Collected: 09/27/22 1035    Order Status: Completed Specimen: Abscess from Heart Updated: 09/29/22 1323     Anaerobic Culture Culture in progress    Narrative:      RA Lead drainage #2    Culture, Anaerobe [[id number]] Collected: 09/27/22 1032    Order Status: Completed Specimen: Abscess from Heart Updated: 09/29/22 1322     Anaerobic Culture Culture in progress    Narrative:      RA Lead drainage #1    Culture, Anaerobe [[id number]] Collected: 09/27/22 0936    Order Status: Completed Specimen: Abscess from Chest, Left Updated: 09/29/22 1322     Anaerobic Culture Culture in progress    Narrative:      Shallow Pocket    Culture, Anaerobe [[id number]] Collected: 09/27/22 1133    Order Status: Completed Specimen: Abscess from Heart Updated: 09/29/22 1321     Anaerobic Culture Culture in progress    Narrative:      RA Lead Tip    Culture, Anaerobe [[id number]] Collected: 09/27/22 1026    Order Status: Completed Specimen: Abscess from Heart Updated: 09/29/22 1320     Anaerobic Culture Culture in progress    Narrative:      LV Lead Tip    Culture, Anaerobe [[id number]] Collected: 09/27/22 0952    Order Status: Completed Specimen: Abscess from Chest, Left Updated: 09/29/22 1320     Anaerobic Culture Culture in progress    Narrative:      Deep Pocket #2    Culture, Anaerobe [[id number]] Collected: 09/27/22 0947    Order Status: Completed Specimen: Abscess from Chest, Left Updated: 09/29/22 1319     Anaerobic Culture Culture in progress    Narrative:      Deep Pocket #1    Culture, Anaerobe [[id number]]     Order Status: No result Specimen: Abscess from Heart     Aerobic culture [[id number]]     Order Status: No result Specimen: Abscess from Heart             BMP:   Recent Labs   Lab 10/01/22  1139      *   K 3.2*   CL 98   CO2 19*   BUN 33*   CREATININE 2.0*   CALCIUM 8.8   MG 1.7     I have reviewed all  pertinent labs within the past 24 hours.    Estimated Creatinine Clearance: 37.6 mL/min (A) (based on SCr of 2 mg/dL (H)).    Diagnostic Results:  Reviewed     Assessment and Plan:     71 yo WM being worked up for LVAD since hospitalization January 2022 for recurrent VT (he thinks had MI) and cardiogenic shock at [facility]. He was  later seen in [facility] where he was treated for cardiogenic shock and sent home on .  He remains on  and is pursuing evaluation for LVAD.     His case was discussed at selection committee and he was deferred pending evaluation of pancreatic mass.  They wish to proceed with MRI but not sure with his ICD if this will be possible.Presented with MRSA bacteremia on admission.   HARRY 9/21/22: Not concerning for Vegetations. BCx negative 9/18.     9/27 CRT generator and lead extraction, pocket cx => hypotensive post-procedure requiring Epi, ICU  9/28 off Epi, transfer to CSU  9/29 sudden hypoxemic respiratory failure requiring urgent intubation after sats dropped while laying flat for PICC line => tx ICU, panculture       Acute Hypoxic respiratory failure 9/29/22   -suspect aspiration event vs PE. Pt currently on ABX   -intubated and sedated 9/29, now extubated 10/1/22    * Chronic combined systolic and diastolic congestive heart failure  - On Dobutamine 2.5 and vaso to maintain MAPS >60. Wean as tolerated  - given 80 iv lasix today.   - Daily weights, Strict I/Os  - Monitor electrolytes and Maintain Mg >2 and K >4  -Telemetry monitoring     Pancreatic lesion  - Gastroenterology consult and recommendations appreciated  - MRI once stable to characterize the lesion for LVAD eval      Bacteremia due to methicillin resistant Staphylococcus aureus  - Monitor WBC count and fever curve, pan-culture if patient spikes  - ID consult and recommendations are appreciated  - On Dapto currently end date 11/11 per ID ( 6 wks after device removal 9/27) for the MRSA bacteremia 9/14, 9/15, 9/16. 9/18 NG  - on Zosyn  started 9/30/22  for concern for aspiration recently.  -9/30 BCX negative     H/O ventricular tachycardia  - Amiodarone 300 mg, daily  - Monitor LFTs.      Coronary artery disease involving native coronary artery of native heart without angina pectoris  - Asprin 81 mg, daily  - Atorvastatin 80 mg, nightl    Left Brachial Vein Thrombosis 9/21  - Heparin restarted today    -UE venous US ordered        Curry Mendoza MD  Heart Transplant  [facility]

## 2022-10-01 NOTE — SUBJECTIVE & OBJECTIVE
Interval History: Pt extubated today to NC. Pt is responsive and following commands.Pt received Lasix 80 IV once.     Hemodynamics     SVO2: 62  CVP: 4  CO: 6.8  CI: 3.2  SVR: 1058  I: 1.1 O: 3.8       Continuous Infusions:   sodium chloride 0.9% Stopped (09/28/22 1550)    sodium chloride 0.9% Stopped (10/01/22 1257)    dexmedetomidine (PRECEDEX) infusion Stopped (10/01/22 0951)    DOBUTamine IV infusion (non-titrating) 2.5 mcg/kg/min (10/01/22 1401)    EPINEPHrine Stopped (09/30/22 0207)    heparin (porcine) in D5W      propofoL Stopped (10/01/22 0755)    vasopressin 0.04 Units/min (10/01/22 1401)     Scheduled Meds:   acetaminophen  650 mg Oral QID    allopurinoL  200 mg Oral Daily    amiodarone  300 mg Oral Daily    aspirin  81 mg Oral Daily    atorvastatin  80 mg Oral Daily    DAPTOmycin (CUBICIN) IV (PEDS and ADULTS)  10 mg/kg Intravenous Q24H    famotidine (PF)  20 mg Intravenous Daily    heparin (PORCINE)  60 Units/kg (Adjusted) Intravenous Once    LIDOcaine  1 patch Transdermal Q24H    magnesium sulfate IVPB  2 g Intravenous Once    piperacillin-tazobactam (ZOSYN) IVPB  4.5 g Intravenous Q8H    polyethylene glycol  17 g Oral Daily     PRN Meds:sodium chloride, acetaminophen, dextromethorphan-guaiFENesin  mg, heparin (PORCINE), heparin (PORCINE), HYDROcodone-acetaminophen, methocarbamoL, ondansetron, senna-docusate 8.6-50 mg, sodium chloride 0.9%    Review of patient's allergies indicates:   Allergen Reactions    Iodine containing multivitamin Swelling     itching    Keflex [cephalexin] Swelling     Eyes.  Tolerated multiple doses of zosyn and 1 dose of augmentin in 2015 and 2016, respectively    Peaches [peach (prunus persica)] Swelling     eyes    Shellfish containing products Swelling    Fig tree Swelling     itching    Tuberculin spenser test ppd Rash     Objective:     Vital Signs (Most Recent):  Temp: 98.2 °F (36.8 °C) (10/01/22 1101)  Pulse: 78 (10/01/22 1401)  Resp: (!) 43 (10/01/22 1401)  BP:  119/61 (10/01/22 1301)  SpO2: 98 % (10/01/22 1401)   Vital Signs (24h Range):  Temp:  [97.3 °F (36.3 °C)-98.2 °F (36.8 °C)] 98.2 °F (36.8 °C)  Pulse:  [55-78] 78  Resp:  [12-43] 43  SpO2:  [97 %-100 %] 98 %  BP: ()/(54-74) 119/61  Arterial Line BP: ()/(46-63) 112/46     No data found.  Body mass index is 32.58 kg/m².      Intake/Output Summary (Last 24 hours) at 10/1/2022 1525  Last data filed at 10/1/2022 1401  Gross per 24 hour   Intake 1274.65 ml   Output 3510 ml   Net -2235.35 ml         Physical Exam  Constitutional:       General: He is not in acute distress.     Appearance: Normal appearance. He is normal weight. He is not ill-appearing or toxic-appearing.   HENT:      Head: Normocephalic and atraumatic.      Nose: Nose normal. No congestion.      Mouth/Throat:      Mouth: Mucous membranes are moist.      Pharynx: No oropharyngeal exudate.   Eyes:      General:         Right eye: No discharge.         Left eye: No discharge.      Extraocular Movements: Extraocular movements intact.      Pupils: Pupils are equal, round, and reactive to light.   Cardiovascular:      Rate and Rhythm: Normal rate and regular rhythm.      Heart sounds: No murmur heard.    No friction rub. No gallop.   Pulmonary:      Effort: Pulmonary effort is normal. No respiratory distress.      Breath sounds: Normal breath sounds. No wheezing, rhonchi or rales.   Abdominal:      General: Abdomen is flat. Bowel sounds are normal. There is no distension.      Palpations: Abdomen is soft.      Tenderness: There is no abdominal tenderness.   Musculoskeletal:         General: No swelling. Normal range of motion.      Cervical back: Normal range of motion. No rigidity.      Right lower leg: No edema.      Left lower leg: No edema.   Skin:     General: Skin is warm and dry.      Findings: No lesion or rash.   Neurological:      General: No focal deficit present.      Mental Status: He is alert and oriented to person, place, and time.       Cranial Nerves: No cranial nerve deficit.      Motor: No weakness.       Significant Labs:  CBC:  Recent Labs   Lab 09/29/22  1746 09/30/22  0421 10/01/22  0358   WBC 17.97* 6.71 6.56   RBC 4.02* 3.43* 3.37*   HGB 11.0* 9.5* 9.4*   HCT 36.8* 29.2* 28.6*   * 313 281   MCV 92 85 85   MCH 27.4 27.7 27.9   MCHC 29.9* 32.5 32.9     BNP:  No results for input(s): BNP in the last 168 hours.    Invalid input(s): BNPTRIAGELBLO  CMP:  Recent Labs   Lab 09/30/22  0421 10/01/22  0358 10/01/22  1139   * 129* 100   CALCIUM 8.8 8.9 8.8   ALBUMIN 3.1* 3.1* 3.0*   PROT 6.2 6.5 6.5   * 129* 134*   K 4.9 3.4* 3.2*   CO2 20* 17* 19*    96 98   BUN 28* 31* 33*   CREATININE 1.8* 1.7* 2.0*   ALKPHOS 75 79 80   ALT 14 12 13   AST 14 14 13   BILITOT 0.5 0.7 0.9      Coagulation:   Recent Labs   Lab 09/25/22  1509 09/26/22  0128 09/30/22 0421   APTT 65.8* 49.8* 26.5     LDH:  No results for input(s): LDH in the last 72 hours.  Microbiology:  Microbiology Results (last 7 days)       Procedure Component Value Units Date/Time    Culture, Respiratory with Gram Stain [[id number]] Collected: 10/01/22 1041    Order Status: Completed Specimen: Respiratory from Endotracheal Aspirate Updated: 10/01/22 1431     Gram Stain (Respiratory) <10 epithelial cells per low power field.     Gram Stain (Respiratory) Rare WBC's     Gram Stain (Respiratory) No organisms seen    Aerobic culture [[id number]] Collected: 09/27/22 1133    Order Status: Completed Specimen: Abscess from Heart Updated: 09/30/22 1153     Aerobic Bacterial Culture No growth    Narrative:      RA Lead Tip    Aerobic culture [[id number]] Collected: 09/27/22 0952    Order Status: Completed Specimen: Abscess from Chest, Left Updated: 09/30/22 1153     Aerobic Bacterial Culture No growth    Narrative:      Deep Pocket #2    Aerobic culture [[id number]] Collected: 09/27/22 1032    Order Status: Completed Specimen: Abscess from Heart Updated: 09/30/22 1153     Aerobic  Bacterial Culture No growth    Narrative:      RA Lead drainage #1    Aerobic culture [[id number]] Collected: 09/27/22 0947    Order Status: Completed Specimen: Abscess from Chest, Left Updated: 09/30/22 1153     Aerobic Bacterial Culture No growth    Narrative:      Deep Pocket #1    Aerobic culture [[id number]] Collected: 09/27/22 1026    Order Status: Completed Specimen: Abscess from Heart Updated: 09/30/22 1153     Aerobic Bacterial Culture No growth    Narrative:      LV Lead Tip    Aerobic culture [[id number]] Collected: 09/27/22 1035    Order Status: Completed Specimen: Abscess from Heart Updated: 09/30/22 1153     Aerobic Bacterial Culture No growth    Narrative:      RA Lead drainage #2    Aerobic culture [[id number]] Collected: 09/27/22 1138    Order Status: Completed Specimen: Abscess from Heart Updated: 09/30/22 1153     Aerobic Bacterial Culture No growth    Narrative:      RV Lead Tip    Aerobic culture [[id number]] Collected: 09/27/22 0936    Order Status: Completed Specimen: Abscess from Chest, Left Updated: 09/30/22 1153     Aerobic Bacterial Culture No growth    Narrative:      Shallow pocket    Blood culture [[id number]] Collected: 09/30/22 0243    Order Status: Sent Specimen: Blood from Peripheral, Antecubital, Right Updated: 09/30/22 0331    Blood culture [[id number]] Collected: 09/30/22 0241    Order Status: Sent Specimen: Blood from Peripheral, Hand, Right Updated: 09/30/22 0331    Culture, Anaerobe [[id number]] Collected: 09/27/22 1138    Order Status: Completed Specimen: Abscess from Heart Updated: 09/29/22 1324     Anaerobic Culture Culture in progress    Narrative:      RV Lead Tip    Culture, Anaerobe [[id number]] Collected: 09/27/22 1035    Order Status: Completed Specimen: Abscess from Heart Updated: 09/29/22 1323     Anaerobic Culture Culture in progress    Narrative:      RA Lead drainage #2    Culture, Anaerobe [[id number]] Collected: 09/27/22 1032    Order Status: Completed Specimen: Abscess  from Heart Updated: 09/29/22 1322     Anaerobic Culture Culture in progress    Narrative:      RA Lead drainage #1    Culture, Anaerobe [[id number]] Collected: 09/27/22 0936    Order Status: Completed Specimen: Abscess from Chest, Left Updated: 09/29/22 1322     Anaerobic Culture Culture in progress    Narrative:      Shallow Pocket    Culture, Anaerobe [[id number]] Collected: 09/27/22 1133    Order Status: Completed Specimen: Abscess from Heart Updated: 09/29/22 1321     Anaerobic Culture Culture in progress    Narrative:      RA Lead Tip    Culture, Anaerobe [[id number]] Collected: 09/27/22 1026    Order Status: Completed Specimen: Abscess from Heart Updated: 09/29/22 1320     Anaerobic Culture Culture in progress    Narrative:      LV Lead Tip    Culture, Anaerobe [[id number]] Collected: 09/27/22 0952    Order Status: Completed Specimen: Abscess from Chest, Left Updated: 09/29/22 1320     Anaerobic Culture Culture in progress    Narrative:      Deep Pocket #2    Culture, Anaerobe [[id number]] Collected: 09/27/22 0947    Order Status: Completed Specimen: Abscess from Chest, Left Updated: 09/29/22 1319     Anaerobic Culture Culture in progress    Narrative:      Deep Pocket #1    Culture, Anaerobe [[id number]]     Order Status: No result Specimen: Abscess from Heart     Aerobic culture [[id number]]     Order Status: No result Specimen: Abscess from Heart             BMP:   Recent Labs   Lab 10/01/22  1139      *   K 3.2*   CL 98   CO2 19*   BUN 33*   CREATININE 2.0*   CALCIUM 8.8   MG 1.7     I have reviewed all pertinent labs within the past 24 hours.    Estimated Creatinine Clearance: 37.6 mL/min (A) (based on SCr of 2 mg/dL (H)).    Diagnostic Results:  Reviewed

## 2022-10-01 NOTE — RESPIRATORY THERAPY
Patient extubated and placed on 6L Bubble humidifier.  Weaning parameters taken prior NIF -50 and VC 1734.  Leak present

## 2022-10-01 NOTE — PLAN OF CARE
Cardiac ICU Care Plan    POC reviewed with Audrey Oneil Jr. and zak. Questions and concerns addressed. No acute events today. Pt progressing toward goals. K: 3.4 this AM Reji SMITH notified. No further orders. See below and flowsheets for full assessment and VS info.       Neuro:  Burt Coma Scale  Best Eye Response: 3-->(E3) to speech  Best Motor Response: 6-->(M6) obeys commands  Best Verbal Response: 1-->(V1) none  Lai Coma Scale Score: 10  Assessment Qualifiers: patient chemically sedated or paralyzed, patient intubated  Pupil PERRLA: yes    24 hr Temp:  [97.3 °F (36.3 °C)-98.6 °F (37 °C)]      CV:  Rhythm: normal sinus rhythm   DVT prophylaxis: VTE Required Core Measure: Pharmacological prophylaxis initiated/maintained    CVP: 10, 10, 7    [REMOVED] PICC Double Lumen 09/22/22 1507 right basilic-Current Insertion Depth (cm): 36 cm (09/22/22 1507)  SVO2 (%): 58 % (09/26/22 1020)               Pulses  Right Radial Pulse: 2+ (normal)  Left Radial Pulse: 2+ (normal)  Right Dorsalis Pedis Pulse: 2+ (normal)  Left Dorsalis Pedis Pulse: 2+ (normal)  Right Posterior Tibial Pulse: 2+ (normal)  Left Posterior Tibial Pulse: 2+ (normal)    Resp:  O2 Device (Oxygen Therapy): ventilator  Flow (L/min): 2  Vent Mode: A/C  Set Rate: 20 BPM  Oxygen Concentration (%): 50  Vt Set: 500 mL  PEEP/CPAP: 7 cmH20    GI/:  GI prophylaxis: no  Diet/Nutrition Received: NPO  Last Bowel Movement: 09/28/22  Voiding Characteristics: ureteral catheter       Urethral Catheter 09/29/22 1600-Reason for Continuing Urinary Catheterization: Critically ill in ICU and requiring hourly monitoring of intake/output   Intake/Output Summary (Last 24 hours) at 10/1/2022 0609  Last data filed at 10/1/2022 0601  Gross per 24 hour   Intake 1136.58 ml   Output 3851 ml   Net -2714.42 ml            Labs/Accuchecks:  Recent Labs   Lab 09/29/22  1746 09/30/22  0421 10/01/22  0358   WBC 17.97* 6.71 6.56   RBC 4.02* 3.43* 3.37*   HGB 11.0* 9.5* 9.4*   HCT  36.8* 29.2* 28.6*   * 313 281      Recent Labs   Lab 09/25/22  1509 09/26/22  0128 09/30/22  0421   APTT 65.8* 49.8* 26.5      Recent Labs     10/01/22  0358   *   K 3.4*   CO2 17*   CL 96   BUN 31*   CREATININE 1.7*   ALKPHOS 79   ALT 12   AST 14   BILITOT 0.7       Recent Labs   Lab 09/30/22  0421   CPK 60      Recent Labs     09/30/22  1214 09/30/22  2044 10/01/22  0430   PH 7.464* 7.432 7.539*   PCO2 32.8* 37.6 31.8*   PO2 77* 30* 81   HCO3 23.6* 25.1 27.1   POCSATURATED 96 60* 97   BE 0 1 5       Electrolytes: No replacement orders  Accuchecks: none    Gtts/LDAs:   sodium chloride 0.9% Stopped (09/28/22 1550)    sodium chloride 0.9% Stopped (10/01/22 0540)    dexmedetomidine (PRECEDEX) infusion 0.3 mcg/kg/hr (10/01/22 0601)    DOBUTamine IV infusion (non-titrating) 2.5 mcg/kg/min (10/01/22 0601)    EPINEPHrine Stopped (09/30/22 0207)    propofoL 25 mcg/kg/min (10/01/22 0601)    vasopressin 0.04 Units/min (10/01/22 0601)       Lines/Drains/Airways       Central Venous Catheter Line  Duration             Percutaneous Central Line Insertion/Assessment - Triple Lumen  09/27/22 0745 left internal jugular 3 days              Drain  Duration                  Urethral Catheter 09/29/22 1600 1 day         NG/OG Tube 09/30/22 1300 Center mouth <1 day              Airway  Duration                  Airway - Non-Surgical 09/29/22 1647 Endotracheal Tube 1 day       Airway Anesthesia 09/29/22 1 day              Peripheral Intravenous Line  Duration                  Peripheral IV - Single Lumen 10/01/22 0000 20 G Anterior;Right Shoulder <1 day         Peripheral IV - Single Lumen 10/01/22 0000 20 G Anterior;Right Wrist <1 day                    Skin/Wounds  Bathing/Skin Care: bath, complete;dressed/undressed;linen changed (09/30/22 2000)  Wounds: No  Wound care consulted: No

## 2022-10-01 NOTE — PLAN OF CARE
Cardiac ICU Care Plan    POC reviewed with Audrey Oneil Jr. and family. Questions and concerns addressed. No acute events today. Pt progressing toward goals. Will continue to monitor. See below and flowsheets for full assessment and VS info.   Extubated to HFNC. AAOx4. CVP 4. OGT removed. Swallow study passed. Gtts per MAR. Heparin started. Pending venous US of BUE. VAD w/u in progress.     Neuro:  Tatamy Coma Scale  Best Eye Response: 4-->(E4) spontaneous  Best Motor Response: 6-->(M6) obeys commands  Best Verbal Response: 5-->(V5) oriented  Lai Coma Scale Score: 15  Assessment Qualifiers: patient not sedated/intubated  Pupil PERRLA: yes    24 hr Temp:  [97.3 °F (36.3 °C)-98.2 °F (36.8 °C)]      CV:  Rhythm: normal sinus rhythm   DVT prophylaxis: VTE Required Core Measure: Pharmacological prophylaxis initiated/maintained    CVP (mean): 4 mmHg (10/01/22 1501)    [REMOVED] PICC Double Lumen 09/22/22 1507 right basilic-Current Insertion Depth (cm): 36 cm (09/22/22 1507)  SVO2 (%): 62 % (10/01/22 0801)               Pulses  Right Radial Pulse: 2+ (normal)  Left Radial Pulse: 2+ (normal)  Right Dorsalis Pedis Pulse: 2+ (normal)  Left Dorsalis Pedis Pulse: 2+ (normal)  Right Posterior Tibial Pulse: 1+ (weak)  Left Posterior Tibial Pulse: 1+ (weak)    Resp:  O2 Device (Oxygen Therapy): High Flow nasal Cannula  Flow (L/min): 6  Vent Mode: Spont  Set Rate: 20 BPM  Oxygen Concentration (%): 50  Vt Set: 500 mL  PEEP/CPAP: 5 cmH20  Pressure Support: 4 cmH20    GI/:  GI prophylaxis: yes  Diet/Nutrition Received: NPO  Last Bowel Movement: 10/01/22  Voiding Characteristics: urethral catheter (bladder)       Urethral Catheter 09/29/22 1600-Reason for Continuing Urinary Catheterization: Critically ill in ICU and requiring hourly monitoring of intake/output   Intake/Output Summary (Last 24 hours) at 10/1/2022 1745  Last data filed at 10/1/2022 1701  Gross per 24 hour   Intake 1463.99 ml   Output 3760 ml   Net -2296.01 ml         Nutritional Supplement Intake: Quantity 0, Type:     Labs/Accuchecks:  Recent Labs   Lab 09/30/22  0421 10/01/22  0358 10/01/22  1547   WBC 6.71 6.56 7.69   RBC 3.43* 3.37* 3.34*   HGB 9.5* 9.4* 9.4*   HCT 29.2* 28.6* 28.1*    281 276      Recent Labs   Lab 09/26/22  0128 09/30/22  0421 10/01/22  1547   INR  --   --  1.0   APTT 49.8* 26.5 28.7      Recent Labs     10/01/22  1139   *   K 3.2*   CO2 19*   CL 98   BUN 33*   CREATININE 2.0*   ALKPHOS 80   ALT 13   AST 13   BILITOT 0.9       Recent Labs   Lab 09/30/22  0421   CPK 60      Recent Labs     10/01/22  0804 10/01/22  1001 10/01/22  1235   PH 7.533* 7.618* 7.539*   PCO2 31.3* 24.1* 31.3*   PO2 28* 162* 186*   HCO3 26.4 24.7 26.7   POCSATURATED 62* 100 100   BE 4 3 4       Electrolytes: Electrolytes replaced  Accuchecks: none    Gtts/LDAs:   sodium chloride 0.9% Stopped (09/28/22 1550)    sodium chloride 0.9% 5 mL/hr at 10/01/22 1701    dexmedetomidine (PRECEDEX) infusion Stopped (10/01/22 0951)    DOBUTamine IV infusion (non-titrating) 2.5 mcg/kg/min (10/01/22 1732)    EPINEPHrine Stopped (09/30/22 0207)    heparin (porcine) in D5W 12 Units/kg/hr (10/01/22 1701)    propofoL Stopped (10/01/22 0755)    vasopressin 0.04 Units/min (10/01/22 1701)       Lines/Drains/Airways       Central Venous Catheter Line  Duration             Percutaneous Central Line Insertion/Assessment - Triple Lumen  09/27/22 0745 left internal jugular 4 days              Drain  Duration                  Urethral Catheter 09/29/22 1600 2 days              Peripheral Intravenous Line  Duration                  Peripheral IV - Single Lumen 10/01/22 0000 20 G Anterior;Right Shoulder <1 day         Peripheral IV - Single Lumen 10/01/22 0000 20 G Anterior;Right Wrist <1 day                    Skin/Wounds  Bathing/Skin Care: bath, complete;dressed/undressed;linen changed (09/30/22 2000)  Wounds: No  Wound care consulted: No

## 2022-10-01 NOTE — PLAN OF CARE
Cardiac ICU Care Plan    POC reviewed with Audrey Oneil Jr. and family. See below and flowsheets for full assessment and VS info.     Neuro:  Alma Coma Scale  Best Eye Response: 3-->(E3) to speech  Best Motor Response: 4-->(M4) withdraws from pain  Best Verbal Response: 1-->(V1) none (pt intubated)  Lai Coma Scale Score: 8  Assessment Qualifiers: patient chemically sedated or paralyzed, patient intubated  Pupil PERRLA: yes    24 hr Temp:  [98.5 °F (36.9 °C)-98.6 °F (37 °C)]      CV:  Rhythm: normal sinus rhythm   DVT prophylaxis: VTE Required Core Measure: Pharmacological prophylaxis initiated/maintained    CVP (mean): 8 mmHg (09/30/22 1500)    [REMOVED] PICC Double Lumen 09/22/22 1507 right basilic-Current Insertion Depth (cm): 36 cm (09/22/22 1507)  SVO2 (%): 58 % (09/26/22 1020)    Pulses  Right Radial Pulse: 2+ (normal)  Left Radial Pulse: 2+ (normal)  Right Dorsalis Pedis Pulse: 2+ (normal)  Left Dorsalis Pedis Pulse: 2+ (normal)  Right Posterior Tibial Pulse: 2+ (normal)  Left Posterior Tibial Pulse: 2+ (normal)    Resp:  O2 Device (Oxygen Therapy): ventilator  Flow (L/min): 2  Vent Mode: A/C  Set Rate: 20 BPM  Oxygen Concentration (%): 50  Vt Set: 500 mL  PEEP/CPAP: 7 cmH20    GI/:  GI prophylaxis: yes  Diet/Nutrition Received: NPO  Last Bowel Movement: 09/28/22  Voiding Characteristics: urethral catheter (bladder)   Intake/Output Summary (Last 24 hours) at 9/30/2022 1955  Last data filed at 9/30/2022 1600  Gross per 24 hour   Intake 1172.47 ml   Output 1791 ml   Net -618.53 ml     Labs/Accuchecks:  Recent Labs   Lab 09/29/22  0355 09/29/22  1746 09/30/22  0421   WBC 7.12 17.97* 6.71   RBC 4.11* 4.02* 3.43*   HGB 11.4* 11.0* 9.5*   HCT 35.9* 36.8* 29.2*   PLT SEE COMMENT 452* 313      Recent Labs   Lab 09/25/22  1509 09/26/22  0128 09/30/22  0421   APTT 65.8* 49.8* 26.5      Recent Labs     09/30/22 0421   *   K 4.9   CO2 20*      BUN 28*   CREATININE 1.8*   ALKPHOS 75   ALT 14   AST  14   BILITOT 0.5       Recent Labs   Lab 09/30/22  0421   CPK 60      Recent Labs     09/30/22  0020 09/30/22  0423 09/30/22  1214   PH 7.410 7.536* 7.464*   PCO2 35.5 30.5* 32.8*   PO2 47 331* 77*   HCO3 22.5* 25.9 23.6*   POCSATURATED 83* 100 96   BE -2 3 0       Electrolytes: N/A - electrolytes WDL  Accuchecks: none    Gtts/LDAs:   sodium chloride 0.9% Stopped (09/28/22 1550)    sodium chloride 0.9% Stopped (09/30/22 1426)    dexmedetomidine (PRECEDEX) infusion 0.5 mcg/kg/hr (09/30/22 1600)    DOBUTamine IV infusion (non-titrating) 2.5 mcg/kg/min (09/30/22 1600)    EPINEPHrine Stopped (09/30/22 0207)    propofoL 25 mcg/kg/min (09/30/22 1825)    vasopressin 0.04 Units/min (09/30/22 1600)       Lines/Drains/Airways       Central Venous Catheter Line  Duration             Percutaneous Central Line Insertion/Assessment - Triple Lumen  09/27/22 0745 left internal jugular 3 days              Drain  Duration                  Urethral Catheter 09/29/22 1600 1 day         NG/OG Tube 09/30/22 1300 Center mouth <1 day              Airway  Duration                  Airway - Non-Surgical 09/29/22 1647 Endotracheal Tube 1 day       Airway Anesthesia 09/29/22 1 day              Peripheral Intravenous Line  Duration                  Peripheral IV - Single Lumen 09/26/22 2000 20 G;1 3/4 in Left Forearm 3 days         Peripheral IV - Single Lumen 09/26/22 2000 20 G;1 3/4 in Right Forearm 3 days                    Skin/Wounds  Bathing/Skin Care: incontinence care (09/29/22 2301)

## 2022-10-02 NOTE — PLAN OF CARE
Cardiac ICU Care Plan    POC reviewed with Audrey Oneil Jr. and family. Questions and concerns addressed. No acute events today.Pt had decreased urine output through out the night trending down slowly since 2300. Notified  MD. Ordered SvO2, CMP and to continue to trend urine as long as pt makes around 45 cc/hr. See below and flowsheets for full assessment and VS info.       Neuro:  Lai Coma Scale  Best Eye Response: 4-->(E4) spontaneous  Best Motor Response: 6-->(M6) obeys commands  Best Verbal Response: 5-->(V5) oriented  Lai Coma Scale Score: 15  Assessment Qualifiers: patient not sedated/intubated  Pupil PERRLA: yes    24 hr Temp:  [98 °F (36.7 °C)-98.4 °F (36.9 °C)]      CV:  Rhythm: normal sinus rhythm   DVT prophylaxis: VTE Required Core Measure: Pharmacological prophylaxis initiated/maintained    CVP: 6, 6, 6    [REMOVED] PICC Double Lumen 09/22/22 1507 right basilic-Current Insertion Depth (cm): 36 cm (09/22/22 1507)  SVO2 (%): 62 % (10/01/22 0801)               Pulses  Right Radial Pulse: 2+ (normal)  Left Radial Pulse: 2+ (normal)  Right Dorsalis Pedis Pulse: 2+ (normal)  Left Dorsalis Pedis Pulse: 2+ (normal)  Right Posterior Tibial Pulse: 1+ (weak)  Left Posterior Tibial Pulse: 1+ (weak)    Resp:  O2 Device (Oxygen Therapy): nasal cannula w/ humidification  Flow (L/min): 6  Vent Mode: Spont  Set Rate: 20 BPM  Oxygen Concentration (%): 50  Vt Set: 500 mL  PEEP/CPAP: 5 cmH20  Pressure Support: 4 cmH20    GI/:  GI prophylaxis: no  Diet/Nutrition Received: 2 gram sodium, low saturated fat/low cholesterol  Last Bowel Movement: 10/01/22  Voiding Characteristics: ureteral catheter       Urethral Catheter 09/29/22 1600-Reason for Continuing Urinary Catheterization: Critically ill in ICU and requiring hourly monitoring of intake/output   Intake/Output Summary (Last 24 hours) at 10/2/2022 0550  Last data filed at 10/2/2022 0501  Gross per 24 hour   Intake 1453.14 ml   Output 2625 ml   Net  -1171.86 ml            Labs/Accuchecks:  Recent Labs   Lab 10/01/22  0358 10/01/22  1547 10/02/22  0351   WBC 6.56 7.69 6.88   RBC 3.37* 3.34* 3.17*   HGB 9.4* 9.4* 8.9*   HCT 28.6* 28.1* 26.8*    276 289      Recent Labs   Lab 10/01/22  1547 10/01/22  2246 10/02/22  0516   INR 1.0  --   --    APTT 28.7 35.2* 36.8*      Recent Labs     10/02/22  0351   *   K 3.7   CO2 22*   CL 98   BUN 29*   CREATININE 2.0*   ALKPHOS 83   ALT 13   AST 15   BILITOT 0.8       Recent Labs   Lab 09/30/22  0421   CPK 60      Recent Labs     10/01/22  1235 10/01/22  2050 10/02/22  0350   PH 7.539* 7.403 7.402   PCO2 31.3* 42.1 41.6   PO2 186* 34* 35*   HCO3 26.7 26.3 25.9   POCSATURATED 100 66* 68*   BE 4 2 1       Electrolytes: No replacement orders  Accuchecks: none    Gtts/LDAs:   sodium chloride 0.9% Stopped (09/28/22 1550)    sodium chloride 0.9% Stopped (10/02/22 0436)    dexmedetomidine (PRECEDEX) infusion Stopped (10/01/22 0951)    DOBUTamine IV infusion (non-titrating) 2.5 mcg/kg/min (10/02/22 0501)    EPINEPHrine Stopped (09/30/22 0207)    heparin (porcine) in D5W 14 Units/kg/hr (10/02/22 0514)    propofoL Stopped (10/01/22 0755)    vasopressin 0.04 Units/min (10/02/22 0512)       Lines/Drains/Airways       Central Venous Catheter Line  Duration             Percutaneous Central Line Insertion/Assessment - Triple Lumen  09/27/22 0745 left internal jugular 4 days              Drain  Duration                  Urethral Catheter 09/29/22 1600 2 days              Peripheral Intravenous Line  Duration                  Peripheral IV - Single Lumen 10/01/22 0000 20 G Anterior;Right Shoulder 1 day         Peripheral IV - Single Lumen 10/01/22 0000 20 G Anterior;Right Wrist 1 day                    Skin/Wounds  Bathing/Skin Care: bath, complete;dressed/undressed;linen changed (10/01/22 1905)  Wounds: No  Wound care consulted: No

## 2022-10-02 NOTE — CARE UPDATE
Hemodynamics     SVO2: 66  CVP: 6  CO: 7.7  CI: 3.7  SVR: 770        Intake/Output Summary (Last 24 hours) at 10/2/2022 0548  Last data filed at 10/2/2022 0501  Gross per 24 hour   Intake 1453.14 ml   Output 2625 ml   Net -1171.86 ml       Plan: No changes.     Case discussed with on call attending.    Smith Perez MD  Cardiology Fellow, PGY4

## 2022-10-02 NOTE — PROGRESS NOTES
[facility] - Cardiac Intensive Care  Heart Transplant  Progress Note    Patient Name: Audrey Oneil Jr.  MRN: [id number]  Admission Date: 9/14/2022  Hospital Length of Stay: 18 days  Attending Physician: Cherelle Hidalgo DO  Primary Care Provider: Primary Doctor No  Principal Problem:Chronic combined systolic and diastolic congestive heart failure    Subjective:     Interval History: Pt extubated on 10/1/22 and doing well at this time. Pt having mild nausea. Denies any other issues at this time.     Continuous Infusions:   sodium chloride 0.9% Stopped (09/28/22 1550)    sodium chloride 0.9% Stopped (10/02/22 1043)    dexmedetomidine (PRECEDEX) infusion Stopped (10/01/22 0951)    DOBUTamine IV infusion (non-titrating) 2.5 mcg/kg/min (10/02/22 1200)    EPINEPHrine Stopped (09/30/22 0207)    heparin (porcine) in D5W 16 Units/kg/hr (10/02/22 1200)    propofoL Stopped (10/01/22 0755)    vasopressin 0.04 Units/min (10/02/22 1434)     Scheduled Meds:   acetaminophen  650 mg Oral QID    allopurinoL  200 mg Oral Daily    amiodarone  300 mg Oral Daily    aspirin  81 mg Oral Daily    atorvastatin  80 mg Oral Daily    DAPTOmycin (CUBICIN) IV (PEDS and ADULTS)  10 mg/kg Intravenous Q24H    famotidine (PF)  20 mg Intravenous Daily    LIDOcaine  1 patch Transdermal Q24H    piperacillin-tazobactam (ZOSYN) IVPB  4.5 g Intravenous Q8H    polyethylene glycol  17 g Oral Daily     PRN Meds:sodium chloride, acetaminophen, dextromethorphan-guaiFENesin  mg, heparin (PORCINE), heparin (PORCINE), HYDROcodone-acetaminophen, methocarbamoL, ondansetron, senna-docusate 8.6-50 mg, sodium chloride 0.9%    Review of patient's allergies indicates:   Allergen Reactions    Iodine containing multivitamin Swelling     itching    Keflex [cephalexin] Swelling     Eyes.  Tolerated multiple doses of zosyn and 1 dose of augmentin in 2015 and 2016, respectively    Peaches [peach (prunus persica)] Swelling     eyes    Shellfish  containing products Swelling    Fig tree Swelling     itching    Tuberculin spenser test ppd Rash     Objective:     Vital Signs (Most Recent):  Temp: 98.2 °F (36.8 °C) (10/02/22 1100)  Pulse: 84 (10/02/22 1300)  Resp: (!) 27 (10/02/22 1300)  BP: (!) 107/58 (10/02/22 0701)  SpO2: 97 % (10/02/22 1300)   Vital Signs (24h Range):  Temp:  [98.2 °F (36.8 °C)-98.4 °F (36.9 °C)] 98.2 °F (36.8 °C)  Pulse:  [71-96] 84  Resp:  [20-41] 27  SpO2:  [91 %-100 %] 97 %  BP: ()/(51-60) 107/58  Arterial Line BP: (106-133)/(46-61) 132/57     Patient Vitals for the past 72 hrs (Last 3 readings):   Weight   10/02/22 0600 93 kg (205 lb 0.4 oz)     Body mass index is 32.11 kg/m².      Intake/Output Summary (Last 24 hours) at 10/2/2022 1503  Last data filed at 10/2/2022 1200  Gross per 24 hour   Intake 1185.38 ml   Output 2000 ml   Net -814.62 ml         Physical Exam  Constitutional:       General: He is not in acute distress.     Appearance: Normal appearance. He is normal weight. He is not ill-appearing or toxic-appearing.   HENT:      Head: Normocephalic and atraumatic.      Nose: Nose normal. No congestion.      Mouth/Throat:      Mouth: Mucous membranes are moist.      Pharynx: No oropharyngeal exudate.   Eyes:      General:         Right eye: No discharge.         Left eye: No discharge.      Extraocular Movements: Extraocular movements intact.      Pupils: Pupils are equal, round, and reactive to light.   Cardiovascular:      Rate and Rhythm: Normal rate and regular rhythm.      Heart sounds: No murmur heard.    No friction rub. No gallop.   Pulmonary:      Effort: Pulmonary effort is normal. No respiratory distress.      Breath sounds: Normal breath sounds. No wheezing, rhonchi or rales.   Abdominal:      General: Abdomen is flat. Bowel sounds are normal. There is no distension.      Palpations: Abdomen is soft.      Tenderness: There is no abdominal tenderness.   Musculoskeletal:         General: No swelling. Normal range of  motion.      Cervical back: Normal range of motion. No rigidity.      Right lower leg: No edema.      Left lower leg: No edema.   Skin:     General: Skin is warm and dry.      Findings: No lesion or rash.   Neurological:      General: No focal deficit present.      Mental Status: He is alert and oriented to person, place, and time.      Cranial Nerves: No cranial nerve deficit.      Motor: No weakness.       Significant Labs:  CBC:  Recent Labs   Lab 10/01/22  0358 10/01/22  1547 10/02/22  0351   WBC 6.56 7.69 6.88   RBC 3.37* 3.34* 3.17*   HGB 9.4* 9.4* 8.9*   HCT 28.6* 28.1* 26.8*    276 289   MCV 85 84 85   MCH 27.9 28.1 28.1   MCHC 32.9 33.5 33.2     BNP:  No results for input(s): BNP in the last 168 hours.    Invalid input(s): BNPTRIAGELBLO  CMP:  Recent Labs   Lab 10/01/22  1139 10/01/22  2246 10/02/22  0351    141* 105   CALCIUM 8.8 8.9 8.5*   ALBUMIN 3.0* 3.1* 2.9*   PROT 6.5 6.7 6.5   * 135* 135*   K 3.2* 3.8 3.7   CO2 19* 21* 22*   CL 98 99 98   BUN 33* 31* 29*   CREATININE 2.0* 2.1* 2.0*   ALKPHOS 80 81 83   ALT 13 13 13   AST 13 15 15   BILITOT 0.9 0.8 0.8      Coagulation:   Recent Labs   Lab 10/01/22  1547 10/01/22  2246 10/02/22  0516 10/02/22  1153   INR 1.0  --   --   --    APTT 28.7 35.2* 36.8* 41.6*     LDH:  No results for input(s): LDH in the last 72 hours.  Microbiology:  Microbiology Results (last 7 days)       Procedure Component Value Units Date/Time    Culture, Respiratory with Gram Stain [[id number]] Collected: 10/01/22 1041    Order Status: Completed Specimen: Respiratory from Endotracheal Aspirate Updated: 10/02/22 1041     Respiratory Culture Insufficient incubation, culture in progress     Gram Stain (Respiratory) <10 epithelial cells per low power field.     Gram Stain (Respiratory) Rare WBC's     Gram Stain (Respiratory) No organisms seen    Blood culture [[id number]] Collected: 09/30/22 0241    Order Status: Completed Specimen: Blood from Peripheral, Hand, Right  Updated: 10/02/22 0613     Blood Culture, Routine No growth to date      No Growth to date    Blood culture [[id number]] Collected: 09/30/22 0243    Order Status: Completed Specimen: Blood from Peripheral, Antecubital, Right Updated: 10/02/22 0612     Blood Culture, Routine No growth to date      No Growth to date    Aerobic culture [[id number]] Collected: 09/27/22 1133    Order Status: Completed Specimen: Abscess from Heart Updated: 09/30/22 1153     Aerobic Bacterial Culture No growth    Narrative:      RA Lead Tip    Aerobic culture [[id number]] Collected: 09/27/22 0952    Order Status: Completed Specimen: Abscess from Chest, Left Updated: 09/30/22 1153     Aerobic Bacterial Culture No growth    Narrative:      Deep Pocket #2    Aerobic culture [[id number]] Collected: 09/27/22 1032    Order Status: Completed Specimen: Abscess from Heart Updated: 09/30/22 1153     Aerobic Bacterial Culture No growth    Narrative:      RA Lead drainage #1    Aerobic culture [[id number]] Collected: 09/27/22 0947    Order Status: Completed Specimen: Abscess from Chest, Left Updated: 09/30/22 1153     Aerobic Bacterial Culture No growth    Narrative:      Deep Pocket #1    Aerobic culture [[id number]] Collected: 09/27/22 1026    Order Status: Completed Specimen: Abscess from Heart Updated: 09/30/22 1153     Aerobic Bacterial Culture No growth    Narrative:      LV Lead Tip    Aerobic culture [[id number]] Collected: 09/27/22 1035    Order Status: Completed Specimen: Abscess from Heart Updated: 09/30/22 1153     Aerobic Bacterial Culture No growth    Narrative:      RA Lead drainage #2    Aerobic culture [[id number]] Collected: 09/27/22 1138    Order Status: Completed Specimen: Abscess from Heart Updated: 09/30/22 1153     Aerobic Bacterial Culture No growth    Narrative:      RV Lead Tip    Aerobic culture [[id number]] Collected: 09/27/22 0936    Order Status: Completed Specimen: Abscess from Chest, Left Updated: 09/30/22 1153     Aerobic  Bacterial Culture No growth    Narrative:      Shallow pocket    Culture, Anaerobe [[id number]] Collected: 09/27/22 1138    Order Status: Completed Specimen: Abscess from Heart Updated: 09/29/22 1324     Anaerobic Culture Culture in progress    Narrative:      RV Lead Tip    Culture, Anaerobe [[id number]] Collected: 09/27/22 1035    Order Status: Completed Specimen: Abscess from Heart Updated: 09/29/22 1323     Anaerobic Culture Culture in progress    Narrative:      RA Lead drainage #2    Culture, Anaerobe [[id number]] Collected: 09/27/22 1032    Order Status: Completed Specimen: Abscess from Heart Updated: 09/29/22 1322     Anaerobic Culture Culture in progress    Narrative:      RA Lead drainage #1    Culture, Anaerobe [[id number]] Collected: 09/27/22 0936    Order Status: Completed Specimen: Abscess from Chest, Left Updated: 09/29/22 1322     Anaerobic Culture Culture in progress    Narrative:      Shallow Pocket    Culture, Anaerobe [[id number]] Collected: 09/27/22 1133    Order Status: Completed Specimen: Abscess from Heart Updated: 09/29/22 1321     Anaerobic Culture Culture in progress    Narrative:      RA Lead Tip    Culture, Anaerobe [[id number]] Collected: 09/27/22 1026    Order Status: Completed Specimen: Abscess from Heart Updated: 09/29/22 1320     Anaerobic Culture Culture in progress    Narrative:      LV Lead Tip    Culture, Anaerobe [[id number]] Collected: 09/27/22 0952    Order Status: Completed Specimen: Abscess from Chest, Left Updated: 09/29/22 1320     Anaerobic Culture Culture in progress    Narrative:      Deep Pocket #2    Culture, Anaerobe [[id number]] Collected: 09/27/22 0947    Order Status: Completed Specimen: Abscess from Chest, Left Updated: 09/29/22 1319     Anaerobic Culture Culture in progress    Narrative:      Deep Pocket #1    Culture, Anaerobe [[id number]]     Order Status: No result Specimen: Abscess from Heart     Aerobic culture [[id number]]     Order Status: No result Specimen:  Abscess from Heart             BMP:   Recent Labs   Lab 10/02/22  0351      *   K 3.7   CL 98   CO2 22*   BUN 29*   CREATININE 2.0*   CALCIUM 8.5*   MG 2.2     I have reviewed all pertinent labs within the past 24 hours.    Estimated Creatinine Clearance: 37.4 mL/min (A) (based on SCr of 2 mg/dL (H)).    Diagnostic Results:  Reviewed     Assessment and Plan:        73 yo WM being worked up for LVAD since hospitalization January 2022 for recurrent VT (he thinks had MI) and cardiogenic shock at [facility]. He was  later seen in [facility] where he was treated for cardiogenic shock and sent home on .  He remains on  and is pursuing evaluation for LVAD.     His case was discussed at selection committee and he was deferred pending evaluation of pancreatic mass.  They wish to proceed with MRI but not sure with his ICD if this will be possible.Presented with MRSA bacteremia on admission.   HARRY 9/21/22: Not concerning for Vegetations. BCx negative 9/18.      9/27 CRT generator and lead extraction, pocket cx => hypotensive post-procedure requiring Epi, ICU  9/28 off Epi, transfer to CSU  9/29 sudden hypoxemic respiratory failure requiring urgent intubation after sats dropped while laying flat for PICC line => tx ICU, panculture       Acute Hypoxic respiratory failure 9/29/22   -suspect aspiration event vs PE. Pt currently on ABX   -intubated and sedated 9/29, now extubated 10/1/22  -Saturating well on NC      * Chronic combined systolic and diastolic congestive heart failure  - On Dobutamine 2.5 and vaso to maintain MAPS >60. Wean vaso as tolerated  - given 80 iv lasix 10/1. Will repeat dose today   - Daily weights, Strict I/Os  - Monitor electrolytes and Maintain Mg >2 and K >4  -Telemetry monitoring     Pancreatic lesion  - Gastroenterology consult and recommendations appreciated  - MRI recommend EUS with biopsy       Bacteremia due to methicillin resistant Staphylococcus aureus  - Monitor WBC count and fever  curve, pan-culture if patient spikes  - ID consult and recommendations are appreciated  - On Dapto currently end date 11/11 per ID ( 6 wks after device removal 9/27) for the MRSA bacteremia 9/14, 9/15, 9/16. 9/18 NG  - on Zosyn started 9/30/22  for concern for aspiration recently.  -9/30 BCX negative      H/O ventricular tachycardia  - Amiodarone 300 mg, daily  - Monitor LFTs.      Coronary artery disease involving native coronary artery of native heart without angina pectoris  - Asprin 81 mg, daily  - Atorvastatin 80 mg, nightl     Left Brachial Vein Thrombosis 9/21  - Heparin restarted 10/1/22   -UE venous US ordered         Curry Mendoza MD  Heart Transplant  [facility] - Cardiac Intensive [facility]

## 2022-10-02 NOTE — SUBJECTIVE & OBJECTIVE
Interval History: Pt extubated on 10/1/22 and doing well at this time. Pt having mild nausea. Denies any other issues at this time.     Continuous Infusions:   sodium chloride 0.9% Stopped (09/28/22 1550)    sodium chloride 0.9% Stopped (10/02/22 1043)    dexmedetomidine (PRECEDEX) infusion Stopped (10/01/22 0951)    DOBUTamine IV infusion (non-titrating) 2.5 mcg/kg/min (10/02/22 1200)    EPINEPHrine Stopped (09/30/22 0207)    heparin (porcine) in D5W 16 Units/kg/hr (10/02/22 1200)    propofoL Stopped (10/01/22 0755)    vasopressin 0.04 Units/min (10/02/22 1434)     Scheduled Meds:   acetaminophen  650 mg Oral QID    allopurinoL  200 mg Oral Daily    amiodarone  300 mg Oral Daily    aspirin  81 mg Oral Daily    atorvastatin  80 mg Oral Daily    DAPTOmycin (CUBICIN) IV (PEDS and ADULTS)  10 mg/kg Intravenous Q24H    famotidine (PF)  20 mg Intravenous Daily    LIDOcaine  1 patch Transdermal Q24H    piperacillin-tazobactam (ZOSYN) IVPB  4.5 g Intravenous Q8H    polyethylene glycol  17 g Oral Daily     PRN Meds:sodium chloride, acetaminophen, dextromethorphan-guaiFENesin  mg, heparin (PORCINE), heparin (PORCINE), HYDROcodone-acetaminophen, methocarbamoL, ondansetron, senna-docusate 8.6-50 mg, sodium chloride 0.9%    Review of patient's allergies indicates:   Allergen Reactions    Iodine containing multivitamin Swelling     itching    Keflex [cephalexin] Swelling     Eyes.  Tolerated multiple doses of zosyn and 1 dose of augmentin in 2015 and 2016, respectively    Peaches [peach (prunus persica)] Swelling     eyes    Shellfish containing products Swelling    Fig tree Swelling     itching    Tuberculin spenser test ppd Rash     Objective:     Vital Signs (Most Recent):  Temp: 98.2 °F (36.8 °C) (10/02/22 1100)  Pulse: 84 (10/02/22 1300)  Resp: (!) 27 (10/02/22 1300)  BP: (!) 107/58 (10/02/22 0701)  SpO2: 97 % (10/02/22 1300)   Vital Signs (24h Range):  Temp:  [98.2 °F (36.8 °C)-98.4 °F (36.9 °C)] 98.2 °F (36.8  °C)  Pulse:  [71-96] 84  Resp:  [20-41] 27  SpO2:  [91 %-100 %] 97 %  BP: ()/(51-60) 107/58  Arterial Line BP: (106-133)/(46-61) 132/57     Patient Vitals for the past 72 hrs (Last 3 readings):   Weight   10/02/22 0600 93 kg (205 lb 0.4 oz)     Body mass index is 32.11 kg/m².      Intake/Output Summary (Last 24 hours) at 10/2/2022 1503  Last data filed at 10/2/2022 1200  Gross per 24 hour   Intake 1185.38 ml   Output 2000 ml   Net -814.62 ml         Physical Exam  Constitutional:       General: He is not in acute distress.     Appearance: Normal appearance. He is normal weight. He is not ill-appearing or toxic-appearing.   HENT:      Head: Normocephalic and atraumatic.      Nose: Nose normal. No congestion.      Mouth/Throat:      Mouth: Mucous membranes are moist.      Pharynx: No oropharyngeal exudate.   Eyes:      General:         Right eye: No discharge.         Left eye: No discharge.      Extraocular Movements: Extraocular movements intact.      Pupils: Pupils are equal, round, and reactive to light.   Cardiovascular:      Rate and Rhythm: Normal rate and regular rhythm.      Heart sounds: No murmur heard.    No friction rub. No gallop.   Pulmonary:      Effort: Pulmonary effort is normal. No respiratory distress.      Breath sounds: Normal breath sounds. No wheezing, rhonchi or rales.   Abdominal:      General: Abdomen is flat. Bowel sounds are normal. There is no distension.      Palpations: Abdomen is soft.      Tenderness: There is no abdominal tenderness.   Musculoskeletal:         General: No swelling. Normal range of motion.      Cervical back: Normal range of motion. No rigidity.      Right lower leg: No edema.      Left lower leg: No edema.   Skin:     General: Skin is warm and dry.      Findings: No lesion or rash.   Neurological:      General: No focal deficit present.      Mental Status: He is alert and oriented to person, place, and time.      Cranial Nerves: No cranial nerve deficit.       Motor: No weakness.       Significant Labs:  CBC:  Recent Labs   Lab 10/01/22  0358 10/01/22  1547 10/02/22  0351   WBC 6.56 7.69 6.88   RBC 3.37* 3.34* 3.17*   HGB 9.4* 9.4* 8.9*   HCT 28.6* 28.1* 26.8*    276 289   MCV 85 84 85   MCH 27.9 28.1 28.1   MCHC 32.9 33.5 33.2     BNP:  No results for input(s): BNP in the last 168 hours.    Invalid input(s): BNPTRIAGELBLO  CMP:  Recent Labs   Lab 10/01/22  1139 10/01/22  2246 10/02/22  0351    141* 105   CALCIUM 8.8 8.9 8.5*   ALBUMIN 3.0* 3.1* 2.9*   PROT 6.5 6.7 6.5   * 135* 135*   K 3.2* 3.8 3.7   CO2 19* 21* 22*   CL 98 99 98   BUN 33* 31* 29*   CREATININE 2.0* 2.1* 2.0*   ALKPHOS 80 81 83   ALT 13 13 13   AST 13 15 15   BILITOT 0.9 0.8 0.8      Coagulation:   Recent Labs   Lab 10/01/22  1547 10/01/22  2246 10/02/22  0516 10/02/22  1153   INR 1.0  --   --   --    APTT 28.7 35.2* 36.8* 41.6*     LDH:  No results for input(s): LDH in the last 72 hours.  Microbiology:  Microbiology Results (last 7 days)       Procedure Component Value Units Date/Time    Culture, Respiratory with Gram Stain [[id number]] Collected: 10/01/22 1041    Order Status: Completed Specimen: Respiratory from Endotracheal Aspirate Updated: 10/02/22 1041     Respiratory Culture Insufficient incubation, culture in progress     Gram Stain (Respiratory) <10 epithelial cells per low power field.     Gram Stain (Respiratory) Rare WBC's     Gram Stain (Respiratory) No organisms seen    Blood culture [[id number]] Collected: 09/30/22 0241    Order Status: Completed Specimen: Blood from Peripheral, Hand, Right Updated: 10/02/22 0613     Blood Culture, Routine No growth to date      No Growth to date    Blood culture [[id number]] Collected: 09/30/22 0243    Order Status: Completed Specimen: Blood from Peripheral, Antecubital, Right Updated: 10/02/22 0612     Blood Culture, Routine No growth to date      No Growth to date    Aerobic culture [[id number]] Collected: 09/27/22 1133    Order Status:  Completed Specimen: Abscess from Heart Updated: 09/30/22 1153     Aerobic Bacterial Culture No growth    Narrative:      RA Lead Tip    Aerobic culture [[id number]] Collected: 09/27/22 0952    Order Status: Completed Specimen: Abscess from Chest, Left Updated: 09/30/22 1153     Aerobic Bacterial Culture No growth    Narrative:      Deep Pocket #2    Aerobic culture [[id number]] Collected: 09/27/22 1032    Order Status: Completed Specimen: Abscess from Heart Updated: 09/30/22 1153     Aerobic Bacterial Culture No growth    Narrative:      RA Lead drainage #1    Aerobic culture [[id number]] Collected: 09/27/22 0947    Order Status: Completed Specimen: Abscess from Chest, Left Updated: 09/30/22 1153     Aerobic Bacterial Culture No growth    Narrative:      Deep Pocket #1    Aerobic culture [[id number]] Collected: 09/27/22 1026    Order Status: Completed Specimen: Abscess from Heart Updated: 09/30/22 1153     Aerobic Bacterial Culture No growth    Narrative:      LV Lead Tip    Aerobic culture [[id number]] Collected: 09/27/22 1035    Order Status: Completed Specimen: Abscess from Heart Updated: 09/30/22 1153     Aerobic Bacterial Culture No growth    Narrative:      RA Lead drainage #2    Aerobic culture [[id number]] Collected: 09/27/22 1138    Order Status: Completed Specimen: Abscess from Heart Updated: 09/30/22 1153     Aerobic Bacterial Culture No growth    Narrative:      RV Lead Tip    Aerobic culture [[id number]] Collected: 09/27/22 0936    Order Status: Completed Specimen: Abscess from Chest, Left Updated: 09/30/22 1153     Aerobic Bacterial Culture No growth    Narrative:      Shallow pocket    Culture, Anaerobe [[id number]] Collected: 09/27/22 1138    Order Status: Completed Specimen: Abscess from Heart Updated: 09/29/22 1324     Anaerobic Culture Culture in progress    Narrative:      RV Lead Tip    Culture, Anaerobe [[id number]] Collected: 09/27/22 1035    Order Status: Completed Specimen: Abscess from Heart  Updated: 09/29/22 1323     Anaerobic Culture Culture in progress    Narrative:      RA Lead drainage #2    Culture, Anaerobe [[id number]] Collected: 09/27/22 1032    Order Status: Completed Specimen: Abscess from Heart Updated: 09/29/22 1322     Anaerobic Culture Culture in progress    Narrative:      RA Lead drainage #1    Culture, Anaerobe [[id number]] Collected: 09/27/22 0936    Order Status: Completed Specimen: Abscess from Chest, Left Updated: 09/29/22 1322     Anaerobic Culture Culture in progress    Narrative:      Shallow Pocket    Culture, Anaerobe [[id number]] Collected: 09/27/22 1133    Order Status: Completed Specimen: Abscess from Heart Updated: 09/29/22 1321     Anaerobic Culture Culture in progress    Narrative:      RA Lead Tip    Culture, Anaerobe [[id number]] Collected: 09/27/22 1026    Order Status: Completed Specimen: Abscess from Heart Updated: 09/29/22 1320     Anaerobic Culture Culture in progress    Narrative:      LV Lead Tip    Culture, Anaerobe [[id number]] Collected: 09/27/22 0952    Order Status: Completed Specimen: Abscess from Chest, Left Updated: 09/29/22 1320     Anaerobic Culture Culture in progress    Narrative:      Deep Pocket #2    Culture, Anaerobe [[id number]] Collected: 09/27/22 0947    Order Status: Completed Specimen: Abscess from Chest, Left Updated: 09/29/22 1319     Anaerobic Culture Culture in progress    Narrative:      Deep Pocket #1    Culture, Anaerobe [[id number]]     Order Status: No result Specimen: Abscess from Heart     Aerobic culture [[id number]]     Order Status: No result Specimen: Abscess from Heart             BMP:   Recent Labs   Lab 10/02/22  0351      *   K 3.7   CL 98   CO2 22*   BUN 29*   CREATININE 2.0*   CALCIUM 8.5*   MG 2.2     I have reviewed all pertinent labs within the past 24 hours.    Estimated Creatinine Clearance: 37.4 mL/min (A) (based on SCr of 2 mg/dL (H)).    Diagnostic Results:  Reviewed

## 2022-10-03 NOTE — ASSESSMENT & PLAN NOTE
Unclear source of bacteremia however potential sources include CVC which has been removed, and translocation from skin in the setting of multiple skin eschars.  Patient is s/p cardiac device removal.  Recommend 6 weeks of IV daptomycin s/p removal.    Outpatient Antibiotic Therapy Plan:    Please send referral to [facility].    1) Infection: AICD infection    2) Discharge Antibiotics:    Intravenous antibiotics:   Daptomycin 900 mg IV q 24 hours      3) Therapy Duration:  6 weeks    Estimated end date of IV antibiotics: November 9    4) Outpatient Weekly Labs:    Order the following labs to be drawn on Mondays:    CBC   CMP    CPK      5) Fax Lab Results to Infectious Diseases Provider: Dr. Russell    [facility] Fax Number: [phone]    6) Outpatient Infectious Diseases Follow-up     Follow-up appointment will be arranged by the ID clinic and will be found in the patient's appointments tab.     Prior to discharge, please ensure the patient's follow-up has been scheduled.     If there is still no follow-up scheduled prior to discharge, please send an EPIC message to Rufina Miles in Infectious Diseases.

## 2022-10-03 NOTE — TREATMENT PLAN
ADVANCED ENDOSCOPY TREATMENT PLAN    - Patient had CRT-D removed. He underwent MRI MRCP (See below).     - Evaluation for a heart transplant is currently on hold till his pancreatic lesion is evaluated.     - He also had a colonoscopy last week (8/8/22):                          - Hemorrhoids found on perianal exam.                          - One 3 mm polyp in the rectum.                          - The examination was otherwise normal on direct                          and retroflexion views.                          - No specimens collected.     Vitals:    10/03/22 0826   BP:    Pulse: (!) 115   Resp: (!) 45   Temp:       Constitutional:  not in acute distress and well developed  HENT: Head: Normal, normocephalic, atraumatic.  Eyes: conjunctiva clear and sclera nonicteric  Cardiovascular: regular rate and rhythm and no murmur  Respiratory: normal chest expansion & respiratory effort   and no accessory muscle use  GI: soft, tenderness moderate in the epigastrium and in the RUQ, without guarding, and without rebound  Musculoskeletal: no muscular tenderness noted  Skin: normal color  Neurological: alert, oriented x3  Psychiatric: mood and affect are within normal limits, pt is a good historian; no memory problems were noted    Assessment/Plan:      Audrey Oneil Jr. is a 72 y.o. male with history of HFrEF (EF 15%, s/p CRT-D, on home ionotropes), CAD s/p PCI, history of VT, HLD, CKD who presents for abdominal pain, found to be in decompensated CHF as well as MRSA bacteremia. AES consulted for pancreatic lesions first seen on imaging in 03/2022 in setting of LVAD evaluation. Side duct IPMNs vs simple cysts.     - MRI MRCP (10/1): Multiple cystic pancreatic lesions.  The most suspicious is a 3 cm multiloculated lesion the uncinate process.  Additional small cystic lesions (largest measuring 1.7 cm) throughout the pancreatic body and tail favored to reflect IPMN.     Problem List:  Pancreatic cystic lesions  HFrEF (EF  15%) on home ionotropes  MRSA bacteremia     Recommendations:  - Discussed EUS but would be high risk with anesthesia. Patient understands increased risk.   - Plan on EUS with biopsy on 10/3/22.  - NPO at midnight. Hold heparin gtt 6 hours prior to procedure.        Thank you for involving us in the care of Audreytrinidad Oneil Jr.. Please call with any additional questions, concerns or changes in the patient's clinical status. We will continue to follow.      Demario Nieto MD  Gastroenterology Fellow PGY IV  [facility]

## 2022-10-03 NOTE — PT/OT/SLP RE-EVAL
Occupational Therapy   [id number]    Name: Audrey Oneil Jr.  MRN: [id number]  Admitting Diagnosis:  Chronic combined systolic and diastolic congestive heart failure  Recent Surgery: Procedure(s) (LRB):  EXTRACTION, ELECTRODE LEAD (N/A)  Transesophageal echo (HARRY) intra-procedure log documentation  Removal, Device 6 Days Post-Op    Recommendations:     Discharge Recommendations: home  Discharge Equipment Recommendations:   (TBD closer to d/c)  Barriers to discharge:  None    Assessment:     Audrey Oneil Jr. is a 72 y.o. male with a medical diagnosis of Chronic combined systolic and diastolic congestive heart failure.  He presents as a re-eval 2* t/f to ICU s/p electode lead extraction and hTN.  Performance deficits affecting function are weakness, impaired functional mobility, impaired cardiopulmonary response to activity, impaired endurance, gait instability, impaired balance, pain, impaired self care skills, decreased lower extremity function.  Pt benefits from co-treatment with PT to accommodate pt's activity tolerance and progression with therapy.      Rehab Prognosis:  Good; patient would benefit from acute skilled OT services to address these deficits and reach maximum level of function.       Plan:     Patient to be seen 3 x/week to address the above listed problems via self-care/home management, therapeutic activities, therapeutic exercises  Plan of Care Expires: 11/02/22  Plan of Care Reviewed with: patient    Subjective     Chief Complaint: gout pain  Patient/Family stated goals: to get better and go home  Communicated with: RN prior to session.  Pain/Comfort:  Pain Rating 1: 10/10  Location 1:  (B feet due to gout and R hand 2* IV)  Pain Addressed 1: Reposition, Distraction, Cessation of Activity  Pain Rating Post-Intervention 1: 10/10    Objective:     Communicated with: RN prior to session.  Patient found supine with: blood pressure cuff, pulse ox (continuous), telemetry, arterial line, central  line upon OT entry to room.    General Precautions: Standard, fall   Orthopedic Precautions:N/A   Braces:    Respiratory Status: Room air    Occupational Performance:    Bed Mobility:    Patient completed Scooting/Bridging with contact guard assistance  Patient completed Supine to Sit with minimum assistance  Patient completed Sit to Supine with contact guard assistance    Functional Mobility/Transfers:  Deferred 2* severe LE pain  Functional Mobility: NT    Activities of Daily Living:  Grooming: stand by assistance    Upper Body Dressing: contact guard assistance    Lower Body Dressing: contact guard assistance donning socks    Cognitive/Visual Perceptual:  Oriented to: Person, Place, Time and Situation  Follows Commands/attention: Follows multistep  commands  Communication: clear/fluent  Memory:  No Deficits noted  Safety awareness/insight to disability: intact  Coping skills/emotional control: Appropriate to situation    Physical Exam:  Postural examination/scapula alignment:    -       No postural abnormalities identified  Sensation:    -       Intact  Upper Extremity Range of Motion:     -       Right Upper Extremity: WFL  -       Left Upper Extremity: WFL  Upper Extremity Strength:    -       Right Upper Extremity: WFL  -       Left Upper Extremity: WFL   Strength:    -       Right Upper Extremity: WFL  -       Left Upper Extremity: WFL  Fine Motor Coordination:    -       Intact  Gross motor coordination:   WFL      AMPAC 6 Click:  AMPAC Total Score: 19    Treatment & Education:  Pt ed on OT POC  Pt ed on importance of activity  Pt ed on ROM ex's 3x daily for increased overall strength and endurance      Patient left supine with all lines intact, call button in reach, and RN notified    GOALS:   Multidisciplinary Problems       Occupational Therapy Goals          Problem: Occupational Therapy    Goal Priority Disciplines Outcome Interventions   Occupational Therapy Goal     OT, PT/OT Ongoing, Progressing     Description: Goals to be met by: 10/13/22     Patient will increase functional independence with ADLs by performing:    UE Dressing with Supervision.  LE Dressing with Supervision.  Grooming while standing at sink with Supervision.  Toileting from toilet with Supervision for hygiene and clothing management.   Toilet transfer to toilet with Supervision.                         History:     Past Medical History:   Diagnosis Date    Acute hypoxemic respiratory failure 11/7/2015    Acute idiopathic gout of left knee 12/2/2019    Acute idiopathic gout of right elbow 9/23/2021    AICD (automatic cardioverter/defibrillator) present 12/13/2015      Anticoagulant long-term use     Bronchopneumonia 12/20/2021    CHF (congestive heart failure)     Chronic anticoagulation 5/5/2016    Chronic combined systolic and diastolic heart failure 11/26/2012    EF 10-20% on ECHO 2013    Chronic gout 12/2/2019    Clotting disorder     Coronary artery disease involving native coronary artery of native heart without angina pectoris 11/26/2012    Cath 10- Stents patent non-obstructive disease Cath 11-12015 non-obstructive disease     COVID-19 08/2021    Had antibody infusion    Diverticulosis of colon     DVT (deep venous thrombosis), unspecified laterality 11/12/2015    Dysphonia 1/28/2018    Essential hypertension 11/15/2015    Fine motor impairment 2/2/2021    Hyperlipidemia     Hypertensive heart disease with heart failure 5/5/2016    MI (myocardial infarction) 2009    x's 5    Nicotine abuse     Obesity 11/26/2012    Olecranon bursitis of right elbow 9/19/2021    Pulmonary embolism 2011    Renal disorder     LAVERNE    Right carpal tunnel syndrome 4/6/2018    Stented coronary artery 11/26/2012    LAD stent placed 10/17/2007     Trigger thumb of right hand 4/6/2018         Past Surgical History:   Procedure Laterality Date    APPENDECTOMY      BACK SURGERY      CARDIAC DEFIBRILLATOR PLACEMENT      CARDIAC SURGERY  2007     stent    CARPAL TUNNEL RELEASE Right 04/2018    COLONOSCOPY N/A 8/8/2022    Procedure: COLONOSCOPY;  Surgeon: Sascha Keenan MD;  Location: [facility] ENDO (2ND FLR);  Service: Endoscopy;  Laterality: N/A;  EF-15%  pre heart    AICD-St Rodri       COVID test [facility] 8/5-inst mail-am prep-clears 4 hrs prior-tb    INSERTION OF BIVENTRICULAR IMPLANTABLE CARDIOVERTER-DEFIBRILLATOR (ICD) N/A 5/3/2019    Procedure: INSERTION, ICD, BIVENTRICULAR;  Surgeon: Teofilo Pal MD;  Location: [facility] EP LAB;  Service: Cardiology;  Laterality: N/A;  ICH CM,  ICD UPGD BI-V,  SJM, MAC, FAS, 3PREP (dual ICD INSITU)    r knee scope      REVISION OF SKIN POCKET FOR CARDIOVERTER-DEFIBRILLATOR Left 5/3/2019    Procedure: Revision, Skin Pocket, For Cardioverter-Defibrillator;  Surgeon: Teofilo Pal MD;  Location: [facility] EP LAB;  Service: Cardiology;  Laterality: Left;    RIGHT HEART CATHETERIZATION Right 6/14/2021    Procedure: INSERTION, CATHETER, RIGHT HEART;  Surgeon: Lizz Nieto MD;  Location: [facility] CATH LAB;  Service: Cardiology;  Laterality: Right;    RIGHT HEART CATHETERIZATION Right 6/17/2022    Procedure: INSERTION, CATHETER, RIGHT HEART;  Surgeon: Migue Henry Jr., MD;  Location: [facility] CATH LAB;  Service: Cardiology;  Laterality: Right;    SPINE SURGERY      TONSILLECTOMY      TRIGGER FINGER RELEASE Right 04/2018    thumb       Time Tracking:     OT Date of Treatment: 10/03/22  OT Start Time: 0805  OT Stop Time: 0823  OT Total Time (min): 18 min    Billable Minutes:Re-eval 10  Self Care/Home Management 8    10/3/2022

## 2022-10-03 NOTE — PT/OT/SLP RE-EVAL
Physical Therapy Fw-Ux-[id number] and co-treatment with OT    Patient Name:  Audrey Oneil Jr.   MRN:  [id number]    Recommendations:     Discharge Recommendations:   (home no needs)   Discharge Equipment Recommendations:  (will determine DME needs closer to discharge)   Barriers to discharge: None    Assessment:     Audrey Oneil Jr. is a 72 y.o. male admitted with a medical diagnosis of Chronic combined systolic and diastolic congestive heart failure.  He presents with the following impairments/functional limitations:  impaired endurance, impaired functional mobility, gait instability, impaired balance pt tolerated treatment fair with pain in BLE limiting functional mobility. Pt will benefit from skilled PT 2x/wk to progress physically. Pt should be able to discharge home with no needs but DME to be determined closer to discharge .      SOCIAL: pt lives with his sister and girlfriend in mobile home with 5 steps and B handrails. Pt owns bath bench and was independent prior to admit. Pt will have assistance of family 24/7    Rehab Prognosis:  good; patient would benefit from acute skilled PT services to address these deficits and reach maximum level of function.      Recent Surgery: Procedure(s) (LRB):  EXTRACTION, ELECTRODE LEAD (N/A)  Transesophageal echo (HARRY) intra-procedure log documentation  Removal, Device 6 Days Post-Op    Plan:     During this hospitalization, patient to be seen 2 x/week to address the above listed problems via gait training, therapeutic activities  Plan of Care Expires:  11/01/22  Plan of Care Reviewed with: patient    Subjective     Communicated with nurse prior to session.  Patient found supine with telemetry, pulse ox (continuous), oxygen, arterial line, central line (CVP) upon PT entry to room, agreeable to evaluation.      Chief Complaint: pt c/o pain in B feet and R wrist.   Patient comments/goals: to have less pain and be able to go home.   Pain/Comfort:  Pain Rating 1: 10/10 (B  feet due to gout and R wrist due to IV placement)  Pain Addressed 1: Cessation of Activity  Pain Rating Post-Intervention 1: 10/10 (see above)    Patients cultural, spiritual, Worship conflicts given the current situation: no      Objective:     Patient found with: telemetry, pulse ox (continuous), oxygen, arterial line, central line (CVP)     General Precautions: Standard, fall   Orthopedic Precautions:N/A   Braces:    Respiratory Status: Nasal cannula, flow 2 L/min    Exams:  Cognitive Exam:  Patient is oriented to Person, Place, Time, and Situation  RLE ROM: WFL  RLE Strength: WFL  LLE ROM: WFL  LLE Strength: WFL    Functional Mobility:  Bed Mobility:     Rolling Left:  contact guard assistance  Rolling Right: contact guard assistance  Scooting: stand by assistance  Supine to Sit: minimum assistance  Sit to Supine: stand by assistance    Sensation: pt reports decreased sensation to light touch RLE.     Balance: pt sat on EOB with supervision to perform ADLS with OT    Transfers and gait training not tested due to pain in B feet.     AM-PAC 6 CLICK MOBILITY  Total Score:10       Therapeutic Activities and Exercises:   Pt received verbal instructions in role of PT and POC. Pt verbally expressed understanding of such.     Patient left supine with all lines intact and call button in reach.    GOALS:   Multidisciplinary Problems       Physical Therapy Goals          Problem: Physical Therapy    Goal Priority Disciplines Outcome Goal Variances Interventions   Physical Therapy Goal     PT, PT/OT Ongoing, Progressing     Description: Goals to be met by: 22    Patient will increase functional independence with mobility by performin. Sit to stand transfer with independence and maintaining sternal precautions- not met  2. Gait  x 300 feet with independence using LRAD as needed- not met  3. Ascend/descend 5 stair with bilateral handrails modified independence and maintaining sternal precautions using LRAD as  needed- not met  4. Lower extremity exercise program x15 reps per handout, with independence- not met                         History:     Past Medical History:   Diagnosis Date    Acute hypoxemic respiratory failure 11/7/2015    Acute idiopathic gout of left knee 12/2/2019    Acute idiopathic gout of right elbow 9/23/2021    AICD (automatic cardioverter/defibrillator) present 12/13/2015      Anticoagulant long-term use     Bronchopneumonia 12/20/2021    CHF (congestive heart failure)     Chronic anticoagulation 5/5/2016    Chronic combined systolic and diastolic heart failure 11/26/2012    EF 10-20% on ECHO 2013    Chronic gout 12/2/2019    Clotting disorder     Coronary artery disease involving native coronary artery of native heart without angina pectoris 11/26/2012    Cath 10- Stents patent non-obstructive disease Cath 11-12015 non-obstructive disease     COVID-19 08/2021    Had antibody infusion    Diverticulosis of colon     DVT (deep venous thrombosis), unspecified laterality 11/12/2015    Dysphonia 1/28/2018    Essential hypertension 11/15/2015    Fine motor impairment 2/2/2021    Hyperlipidemia     Hypertensive heart disease with heart failure 5/5/2016    MI (myocardial infarction) 2009    x's 5    Nicotine abuse     Obesity 11/26/2012    Olecranon bursitis of right elbow 9/19/2021    Pulmonary embolism 2011    Renal disorder     LAVERNE    Right carpal tunnel syndrome 4/6/2018    Stented coronary artery 11/26/2012    LAD stent placed 10/17/2007     Trigger thumb of right hand 4/6/2018       Past Surgical History:   Procedure Laterality Date    APPENDECTOMY      BACK SURGERY      CARDIAC DEFIBRILLATOR PLACEMENT      CARDIAC SURGERY  2007    stent    CARPAL TUNNEL RELEASE Right 04/2018    COLONOSCOPY N/A 8/8/2022    Procedure: COLONOSCOPY;  Surgeon: Sascha Keenan MD;  Location: [facility] ENDO ([address] [place] [address]);  Service: Endoscopy;  Laterality: N/A;  EF-15%  pre heart    AICD-St Rodri       COVID test  [facility] [place]5-inst mail-am prep-clears 4 hrs prior-tb    INSERTION OF BIVENTRICULAR IMPLANTABLE CARDIOVERTER-DEFIBRILLATOR (ICD) N/A 5/3/2019    Procedure: INSERTION, ICD, BIVENTRICULAR;  Surgeon: Teofilo Pal MD;  Location: [facility] EP LAB;  Service: Cardiology;  Laterality: N/A;  ICH CM,  ICD UPGD BI-V,  SJM, MAC, FAS, 3PREP (dual ICD INSITU)    r knee scope      REVISION OF SKIN POCKET FOR CARDIOVERTER-DEFIBRILLATOR Left 5/3/2019    Procedure: Revision, Skin Pocket, For Cardioverter-Defibrillator;  Surgeon: Teofilo Pal MD;  Location: [facility] EP LAB;  Service: Cardiology;  Laterality: Left;    RIGHT HEART CATHETERIZATION Right 6/14/2021    Procedure: INSERTION, CATHETER, RIGHT HEART;  Surgeon: Lizz Nieto MD;  Location: [facility] CATH LAB;  Service: Cardiology;  Laterality: Right;    RIGHT HEART CATHETERIZATION Right 6/17/2022    Procedure: INSERTION, CATHETER, RIGHT HEART;  Surgeon: Migue Henry Jr., MD;  Location: [facility] CATH LAB;  Service: Cardiology;  Laterality: Right;    SPINE SURGERY      TONSILLECTOMY      TRIGGER FINGER RELEASE Right 04/2018    thumb       Time Tracking:     PT Received On: 10/03/22  PT Start Time: 0805     PT Stop Time: 0822  PT Total Time (min): 17 min     Billable Minutes: Re-eval 8 min and Therapeutic Activity 9 min      10/03/2022

## 2022-10-03 NOTE — PROGRESS NOTES
[facility] - Cardiac Intensive Care  Heart Transplant  Progress Note    Patient Name: Audrey Oneil Jr.  MRN: [id number]  Admission Date: 9/14/2022  Hospital Length of Stay: 19 days  Attending Physician: Cherelle Hidalgo DO  Primary Care Provider: Primary Doctor No  Principal Problem:Chronic combined systolic and diastolic congestive heart failure    Subjective:     Interval History: Pt extubated on 10/1/22 and doing well at this time on 2l NC satting at 96%. On Dobutmine 2.5( baseline) and off pressors.   Plan for EUS with biopsy of Pancreatic lesion tomorrow. Will keep NPO past midnight and Hold heparin at 3 am 10/4/22.   Continuous Infusions:   sodium chloride 0.9% Stopped (09/28/22 1550)    sodium chloride 0.9% 5 mL/hr at 10/03/22 0445    DOBUTamine IV infusion (non-titrating) 2.5 mcg/kg/min (10/03/22 0505)    EPINEPHrine Stopped (09/30/22 0207)    heparin (porcine) in D5W 18 Units/kg/hr (10/03/22 0505)     Scheduled Meds:   acetaminophen  650 mg Oral QID    allopurinoL  200 mg Oral Daily    amiodarone  300 mg Oral Daily    aspirin  81 mg Oral Daily    atorvastatin  80 mg Oral Daily    DAPTOmycin (CUBICIN) IV (PEDS and ADULTS)  10 mg/kg Intravenous Q24H    famotidine (PF)  20 mg Intravenous Daily    LIDOcaine  1 patch Transdermal Q24H    piperacillin-tazobactam (ZOSYN) IVPB  4.5 g Intravenous Q8H    polyethylene glycol  17 g Oral Daily    predniSONE  30 mg Oral Daily     PRN Meds:sodium chloride, acetaminophen, dextromethorphan-guaiFENesin  mg, heparin (PORCINE), heparin (PORCINE), HYDROcodone-acetaminophen, melatonin, methocarbamoL, ondansetron, senna-docusate 8.6-50 mg, sodium chloride 0.9%    Review of patient's allergies indicates:   Allergen Reactions    Iodine containing multivitamin Swelling     itching    Keflex [cephalexin] Swelling     Eyes.  Tolerated multiple doses of zosyn and 1 dose of augmentin in 2015 and 2016, respectively    Peaches [peach (prunus persica)] Swelling      eyes    Shellfish containing products Swelling    Fig tree Swelling     itching    Tuberculin spenser test ppd Rash     Objective:     Vital Signs (Most Recent):  Temp: 98.4 °F (36.9 °C) (10/03/22 0700)  Pulse: (!) 115 (10/03/22 0826)  Resp: (!) 45 (10/03/22 0826)  BP: (!) 136/57 (10/03/22 0305)  SpO2: 99 % (10/03/22 0826)   Vital Signs (24h Range):  Temp:  [97.6 °F (36.4 °C)-98.6 °F (37 °C)] 98.4 °F (36.9 °C)  Pulse:  [] 115  Resp:  [14-45] 45  SpO2:  [91 %-100 %] 99 %  BP: (102-136)/(54-57) 136/57  Arterial Line BP: (115-141)/(49-71) 118/55     Patient Vitals for the past 72 hrs (Last 3 readings):   Weight   10/02/22 0600 93 kg (205 lb 0.4 oz)       Body mass index is 32.11 kg/m².      Intake/Output Summary (Last 24 hours) at 10/3/2022 1027  Last data filed at 10/3/2022 0902  Gross per 24 hour   Intake 741.6 ml   Output 1175 ml   Net -433.4 ml           Physical Exam  Constitutional:       General: He is not in acute distress.     Appearance: Normal appearance. He is normal weight. He is not ill-appearing or toxic-appearing.   HENT:      Head: Normocephalic and atraumatic.      Nose: Nose normal. No congestion.      Mouth/Throat:      Mouth: Mucous membranes are moist.      Pharynx: No oropharyngeal exudate.   Eyes:      General:         Right eye: No discharge.         Left eye: No discharge.      Extraocular Movements: Extraocular movements intact.      Pupils: Pupils are equal, round, and reactive to light.   Cardiovascular:      Rate and Rhythm: Normal rate and regular rhythm.      Heart sounds: No murmur heard.    No friction rub. No gallop.   Pulmonary:      Effort: Pulmonary effort is normal. No respiratory distress.      Breath sounds: Normal breath sounds. No wheezing, rhonchi or rales.   Abdominal:      General: Abdomen is flat. Bowel sounds are normal. There is no distension.      Palpations: Abdomen is soft.      Tenderness: There is no abdominal tenderness.   Musculoskeletal:         General: No  swelling. Normal range of motion.      Cervical back: Normal range of motion. No rigidity.      Right lower leg: No edema.      Left lower leg: No edema.   Skin:     General: Skin is warm and dry.      Findings: No lesion or rash.   Neurological:      General: No focal deficit present.      Mental Status: He is alert and oriented to person, place, and time.      Cranial Nerves: No cranial nerve deficit.      Motor: No weakness.       Significant Labs:  CBC:  Recent Labs   Lab 10/01/22  1547 10/02/22  0351 10/03/22  0217   WBC 7.69 6.88 6.66   RBC 3.34* 3.17* 3.32*   HGB 9.4* 8.9* 9.2*   HCT 28.1* 26.8* 29.1*    289 312   MCV 84 85 88   MCH 28.1 28.1 27.7   MCHC 33.5 33.2 31.6*       BNP:  No results for input(s): BNP in the last 168 hours.    Invalid input(s): BNPTRIAGELBLO  CMP:  Recent Labs   Lab 10/01/22  1139 10/01/22  2246 10/02/22  0351 10/03/22  0506    141* 105 93   CALCIUM 8.8 8.9 8.5* 9.1   ALBUMIN 3.0* 3.1* 2.9*  --    PROT 6.5 6.7 6.5  --    * 135* 135* 136   K 3.2* 3.8 3.7 3.6   CO2 19* 21* 22* 24   CL 98 99 98 99   BUN 33* 31* 29* 27*   CREATININE 2.0* 2.1* 2.0* 2.2*   ALKPHOS 80 81 83  --    ALT 13 13 13  --    AST 13 15 15  --    BILITOT 0.9 0.8 0.8  --         Coagulation:   Recent Labs   Lab 10/01/22  1547 10/01/22  2246 10/02/22  1824 10/03/22  0217 10/03/22  0837   INR 1.0  --   --   --   --    APTT 28.7   < > 38.4* 50.7* 45.8*    < > = values in this interval not displayed.       LDH:  No results for input(s): LDH in the last 72 hours.  Microbiology:  Microbiology Results (last 7 days)       Procedure Component Value Units Date/Time    Blood culture [[id number]] Collected: 09/30/22 0241    Order Status: Completed Specimen: Blood from Peripheral, Hand, Right Updated: 10/03/22 0612     Blood Culture, Routine No growth to date      No Growth to date      No Growth to date    Blood culture [[id number]] Collected: 09/30/22 0243    Order Status: Completed Specimen: Blood from  Peripheral, Antecubital, Right Updated: 10/03/22 0612     Blood Culture, Routine No growth to date      No Growth to date      No Growth to date    Culture, Respiratory with Gram Stain [[id number]] Collected: 10/01/22 1041    Order Status: Completed Specimen: Respiratory from Endotracheal Aspirate Updated: 10/02/22 1041     Respiratory Culture Insufficient incubation, culture in progress     Gram Stain (Respiratory) <10 epithelial cells per low power field.     Gram Stain (Respiratory) Rare WBC's     Gram Stain (Respiratory) No organisms seen    Aerobic culture [[id number]] Collected: 09/27/22 1133    Order Status: Completed Specimen: Abscess from Heart Updated: 09/30/22 1153     Aerobic Bacterial Culture No growth    Narrative:      RA Lead Tip    Aerobic culture [[id number]] Collected: 09/27/22 0952    Order Status: Completed Specimen: Abscess from Chest, Left Updated: 09/30/22 1153     Aerobic Bacterial Culture No growth    Narrative:      Deep Pocket #2    Aerobic culture [[id number]] Collected: 09/27/22 1032    Order Status: Completed Specimen: Abscess from Heart Updated: 09/30/22 1153     Aerobic Bacterial Culture No growth    Narrative:      RA Lead drainage #1    Aerobic culture [[id number]] Collected: 09/27/22 0947    Order Status: Completed Specimen: Abscess from Chest, Left Updated: 09/30/22 1153     Aerobic Bacterial Culture No growth    Narrative:      Deep Pocket #1    Aerobic culture [[id number]] Collected: 09/27/22 1026    Order Status: Completed Specimen: Abscess from Heart Updated: 09/30/22 1153     Aerobic Bacterial Culture No growth    Narrative:      LV Lead Tip    Aerobic culture [[id number]] Collected: 09/27/22 1035    Order Status: Completed Specimen: Abscess from Heart Updated: 09/30/22 1153     Aerobic Bacterial Culture No growth    Narrative:      RA Lead drainage #2    Aerobic culture [[id number]] Collected: 09/27/22 1138    Order Status: Completed Specimen: Abscess from Heart Updated: 09/30/22  1153     Aerobic Bacterial Culture No growth    Narrative:      RV Lead Tip    Aerobic culture [[id number]] Collected: 09/27/22 0936    Order Status: Completed Specimen: Abscess from Chest, Left Updated: 09/30/22 1153     Aerobic Bacterial Culture No growth    Narrative:      Shallow pocket    Culture, Anaerobe [[id number]] Collected: 09/27/22 1138    Order Status: Completed Specimen: Abscess from Heart Updated: 09/29/22 1324     Anaerobic Culture Culture in progress    Narrative:      RV Lead Tip    Culture, Anaerobe [[id number]] Collected: 09/27/22 1035    Order Status: Completed Specimen: Abscess from Heart Updated: 09/29/22 1323     Anaerobic Culture Culture in progress    Narrative:      RA Lead drainage #2    Culture, Anaerobe [[id number]] Collected: 09/27/22 1032    Order Status: Completed Specimen: Abscess from Heart Updated: 09/29/22 1322     Anaerobic Culture Culture in progress    Narrative:      RA Lead drainage #1    Culture, Anaerobe [[id number]] Collected: 09/27/22 0936    Order Status: Completed Specimen: Abscess from Chest, Left Updated: 09/29/22 1322     Anaerobic Culture Culture in progress    Narrative:      Shallow Pocket    Culture, Anaerobe [[id number]] Collected: 09/27/22 1133    Order Status: Completed Specimen: Abscess from Heart Updated: 09/29/22 1321     Anaerobic Culture Culture in progress    Narrative:      RA Lead Tip    Culture, Anaerobe [[id number]] Collected: 09/27/22 1026    Order Status: Completed Specimen: Abscess from Heart Updated: 09/29/22 1320     Anaerobic Culture Culture in progress    Narrative:      LV Lead Tip    Culture, Anaerobe [[id number]] Collected: 09/27/22 0952    Order Status: Completed Specimen: Abscess from Chest, Left Updated: 09/29/22 1320     Anaerobic Culture Culture in progress    Narrative:      Deep Pocket #2    Culture, Anaerobe [[id number]] Collected: 09/27/22 0947    Order Status: Completed Specimen: Abscess from Chest, Left Updated: 09/29/22 1319      Anaerobic Culture Culture in progress    Narrative:      Deep Pocket #1    Culture, Anaerobe [[id number]]     Order Status: No result Specimen: Abscess from Heart     Aerobic culture [[id number]]     Order Status: No result Specimen: Abscess from Heart             BMP:   Recent Labs   Lab 10/03/22  0506   GLU 93      K 3.6   CL 99   CO2 24   BUN 27*   CREATININE 2.2*   CALCIUM 9.1   MG 2.2       I have reviewed all pertinent labs within the past 24 hours.    Estimated Creatinine Clearance: 34 mL/min (A) (based on SCr of 2.2 mg/dL (H)).    Diagnostic Results:  Reviewed     Assessment and Plan:     71 yo WM being worked up for LVAD since hospitalization January 2022 for recurrent VT (he thinks had MI) and cardiogenic shock at [facility]. He was  later seen in [facility] where he was treated for cardiogenic shock and sent home on .  He remains on  and is pursuing evaluation for LVAD.     His case was discussed at selection committee and he was deferred pending evaluation of pancreatic mass.  They wish to proceed with MRI but not sure with his ICD if this will be possible.Presented with MRSA bacteremia on admission.   HARRY 9/21/22: Not concerning for Vegetations. BCx negative 9/18.      9/27 CRT generator and lead extraction, pocket cx => hypotensive post-procedure requiring Epi, ICU  9/28 off Epi, transfer to CSU  9/29 sudden hypoxemic respiratory failure requiring urgent intubation after sats dropped while laying flat for PICC line => tx ICU, panculture       Acute Hypoxic respiratory failure 9/29/22   -suspect aspiration event vs PE. Pt currently on ABX   -intubated and sedated 9/29, now extubated 10/1/22  -Saturating well on NC 2l     * Chronic combined systolic and diastolic congestive heart failure  - On Dobutamine 2.5 which is his baseline and off pressors since 10/2  ,MAPS >60.   - off diuretics currently. Bump in cr to 2.2 this am.  CVP 9 this am   -CO 11, CI 5.2, .  - Daily weights, Strict I/Os  - Monitor  electrolytes and Maintain Mg >2 and K >4  -Telemetry monitoring     Pancreatic lesion  - Gastroenterology consult and recommendations appreciated  - MRI recommend EUS with biopsy  for tomorrow. NPO after midnight. Will hold heparin drip at 3 am 10/4/22.      Bacteremia due to methicillin resistant Staphylococcus aureus  - Monitor WBC count and fever curve, pan-culture if patient spikes  - ID consult and recommendations are appreciated  - On Dapto currently end date 11/11 per ID ( 6 wks after device removal 9/27) for the MRSA bacteremia 9/14, 9/15, 9/16 positive for MRSA. 9/18 NG  - on Zosyn started 9/30/22  for concern for aspiration recently.  -9/30 BCX negative , resp cultures 10/1 NG     H/O ventricular tachycardia  - Amiodarone 300 mg, daily  - Monitor LFTs.      Coronary artery disease involving native coronary artery of native heart without angina pectoris  - Asprin 81 mg, daily  - Atorvastatin 80 mg, nightl     Left Brachial Vein Thrombosis 9/21  - Heparin restarted 10/1/22       Acute on Chronic Gout  - pred 30 mg daily for 5 days. Expect elevation in wbc.   -On allopurinol 200 mg       Benton Rendon MD  Heart Transplant  [facility] - Cardiac Intensive Care

## 2022-10-03 NOTE — PLAN OF CARE
Cardiac ICU Care Plan    POC reviewed with Audrey Oneil Jr. and family. Questions and concerns addressed. No acute events today. Pt progressing toward goals. Will continue to monitor. See below and flowsheets for full assessment and VS info.       Neuro:  [place] Coma Scale  Best Eye Response: 4-->(E4) spontaneous  Best Motor Response: 6-->(M6) obeys commands  Best Verbal Response: 5-->(V5) oriented  Lai Coma Scale Score: 15  Assessment Qualifiers: patient not sedated/intubated  Pupil PERRLA: yes    24 hr Temp:  [98.2 °F (36.8 °C)-98.4 °F (36.9 °C)]      CV:  Rhythm: normal sinus rhythm   DVT prophylaxis: VTE Required Core Measure: Pharmacological prophylaxis initiated/maintained    CVP (mean): 9 mmHg (10/02/22 1500)    [REMOVED] PICC Double Lumen 09/22/22 1507 right basilic-Current Insertion Depth (cm): 36 cm (09/22/22 1507)  SVO2 (%): 62 % (10/01/22 0801)               Pulses  Right Radial Pulse: 2+ (normal)  Left Radial Pulse: 2+ (normal)  Right Dorsalis Pedis Pulse: 2+ (normal)  Left Dorsalis Pedis Pulse: 2+ (normal)  Right Posterior Tibial Pulse: 1+ (weak)  Left Posterior Tibial Pulse: 1+ (weak)    Resp:  O2 Device (Oxygen Therapy): nasal cannula  Flow (L/min): 3  Vent Mode: Spont  Set Rate: 20 BPM  Oxygen Concentration (%): 50  Vt Set: 500 mL  PEEP/CPAP: 5 cmH20  Pressure Support: 4 cmH20    GI/:  GI prophylaxis: yes  Diet/Nutrition Received: 2 gram sodium, low saturated fat/low cholesterol  Last Bowel Movement: 10/02/22     Voiding Characteristics: urethral catheter (bladder)  [REMOVED]      Urethral Catheter 09/29/22 1600-Reason for Continuing Urinary Catheterization: Urinary retention, Critically ill in ICU and requiring hourly monitoring of intake/output   Intake/Output Summary (Last 24 hours) at 10/2/2022 1933  Last data filed at 10/2/2022 1800  Gross per 24 hour   Intake 1147.35 ml   Output 1670 ml   Net -522.65 ml     Labs/Accuchecks:  Recent Labs   Lab 10/01/22  0358 10/01/22  7597  10/02/22  0351   WBC 6.56 7.69 6.88   RBC 3.37* 3.34* 3.17*   HGB 9.4* 9.4* 8.9*   HCT 28.6* 28.1* 26.8*    276 289      Recent Labs   Lab 10/01/22  1547 10/01/22  2246 10/02/22  0516 10/02/22  1153 10/02/22  1824   INR 1.0  --   --   --   --    APTT 28.7   < > 36.8* 41.6* 38.4*    < > = values in this interval not displayed.      Recent Labs     10/02/22  0351   *   K 3.7   CO2 22*   CL 98   BUN 29*   CREATININE 2.0*   ALKPHOS 83   ALT 13   AST 15   BILITOT 0.8       Recent Labs   Lab 09/30/22  0421   CPK 60      Recent Labs     10/01/22  1235 10/01/22  2050 10/02/22  0350   PH 7.539* 7.403 7.402   PCO2 31.3* 42.1 41.6   PO2 186* 34* 35*   HCO3 26.7 26.3 25.9   POCSATURATED 100 66* 68*   BE 4 2 1       Electrolytes: N/A - electrolytes WDL  Accuchecks: none    Gtts/LDAs:   sodium chloride 0.9% Stopped (09/28/22 1550)    sodium chloride 0.9% 5 mL/hr at 10/02/22 1800    DOBUTamine IV infusion (non-titrating) 2.5 mcg/kg/min (10/02/22 1800)    EPINEPHrine Stopped (09/30/22 0207)    heparin (porcine) in D5W 16 Units/kg/hr (10/02/22 1800)       Lines/Drains/Airways       Central Venous Catheter Line  Duration             Percutaneous Central Line Insertion/Assessment - Triple Lumen  09/27/22 0745 left internal jugular 5 days              Peripheral Intravenous Line  Duration                  Peripheral IV - Single Lumen 10/01/22 0000 20 G Anterior;Right Shoulder 1 day         Peripheral IV - Single Lumen 10/01/22 0000 20 G Anterior;Right Wrist 1 day                    Skin/Wounds  Bathing/Skin Care: bath, complete;dressed/undressed;linen changed (10/01/22 1905)

## 2022-10-03 NOTE — PROGRESS NOTES
[facility]  Infectious Disease  Progress Note    Patient Name: Audrey Oneil Jr.  MRN: [id number]  Admission Date: 9/14/2022  Length of Stay: 18 days  Attending Physician: Cherelle Hidalgo DO  Primary Care Provider: Primary Doctor No    Isolation Status: No active isolations  Assessment/Plan:      Bacteremia due to methicillin resistant Staphylococcus aureus  Unclear source of bacteremia however potential sources include CVC which has been removed, and translocation from skin in the setting of multiple skin eschars.  Patient is s/p cardiac device removal.  Recommend 6 weeks of IV daptomycin s/p removal.    Outpatient Antibiotic Therapy Plan:    Please send referral to [facility].    1) Infection: AICD infection    2) Discharge Antibiotics:    Intravenous antibiotics:   Daptomycin 900 mg IV q 24 hours      3) Therapy Duration:  6 weeks    Estimated end date of IV antibiotics: November 9    4) Outpatient Weekly Labs:    Order the following labs to be drawn on Mondays:    CBC   CMP    CPK      5) Fax Lab Results to Infectious Diseases Provider: Dr. Russell    [facility] Fax Number: [phone]    6) Outpatient Infectious Diseases Follow-up     Follow-up appointment will be arranged by the ID clinic and will be found in the patient's appointments tab.     Prior to discharge, please ensure the patient's follow-up has been scheduled.     If there is still no follow-up scheduled prior to discharge, please send an EPIC message to Rufina Miles in Infectious Diseases.                  Anticipated Disposition: TBD    Thank you for your consult. I will sign off. Please contact us if you have any additional questions.    Demarcus Russell MD  Infectious Disease  [facility]    Subjective:     Principal Problem:Chronic combined systolic and diastolic congestive heart failure    HPI: HPI: Patient is a 73yo, male, with PMH of CAD s/p MI,  chronic combined systolic and diastolic heart failure on home dobutamine (EF15%; NYHA-3), HTN/HLD, CKD 3, chronic back pain, cardiac resynch therapy defib (CRT-D), hx of recurrent Vtach and cardiogenic shock (01/2022) at [facility]; which prompted patient re-decision to pursue eval / consideration for LVAD and potential heart transplant recipient. Currently patient case being deferred for transplant recipient selection pending further of recently found pancreatic mass with advanced Imaging study to consider ICD in place; but pt is also still under consideration for DT-LVAD.  --Patient presented to ED (09/14) for new acute onset of central generalized abdominal pain with Right flank pain, headache, nausea, fever (102.2F on arrival), with tachycardia (up to 114bpm on arrival), abdominal distention, SOB requiring 4L home oxygen which began less than 12hrs after eating packaged spaghetti - reports being compliant with at-home medications.     Patient found to have MRSA-bacteremia w/ sepsis criteria on arrival (101.2F, 114bpm); unclear source of infection -- possibly eschar burn wound at left wrist (occurred 4-days ago) vs  PICC line less likley due to absence of signs of infected. Blood cxr x2 (09/14): speciated Staph aureus (ID / susc pending); but MRSA-ID PCR: Positive (09/15).   Patient started on IV-Vanc (2g). Fever reduced with anti-pyretic, stable at 99F, endorses sweats. Remains without leukocytosis.   (09/16) CrCL: 45.1mL/min, up trend from (CrCl:35) day prior 09/15.    Repeat blood cxrs x2 (09/15): GPC-cluster, resembling staph (ID / susc pending)  Repeat blood cxr x2 (09/16): sent --awaiting results.  TTE (09/14): negative for valve vegetations or abscesses; showed left sided atrial and ventricular chamber abnormalities; EF10-15%, LV-diastolic dysfunction; mild mitral regurg.  09/14: Chest-xr: shows increasing pulmonary interstitial edema.  09/14: CT-AP (w/o contrast): No acute abdomen/pelvic abnormality to account  for pt hx of pain. showed multiple stable indeterminate pancreatic hypoattenuating lesions. Sigmoid colonic diverticulosis without diverticulitis or other bowel inflammatory process. Cholelithiasis. Cardiomegaly and trace pericardial fluid.      ID reconsulted on 9/30 for sepsis. Pt admitted to CCU, intubated and on pressors. Per chart review, PICC line was attempted at bedside on 9/29 - pt was anxious and hypoxic with subsequent emergent intubation. CXR with harinder pulm infiltrates with concerns for potential aspiration during hypoxic/anxious episode. Repeat blcx in process. He is currently on daptomycin and zosyn.     Interval History:     No adverse events.    Review of Systems   Unable to perform ROS: Intubated   Objective:     Vital Signs (Most Recent):  Temp: 97.6 °F (36.4 °C) (10/02/22 2304)  Pulse: 97 (10/02/22 2304)  Resp: 18 (10/02/22 2304)  BP: (!) 136/55 (10/02/22 2304)  SpO2: 99 % (10/02/22 2304)   Vital Signs (24h Range):  Temp:  [97.6 °F (36.4 °C)-98.6 °F (37 °C)] 97.6 °F (36.4 °C)  Pulse:  [78-98] 97  Resp:  [18-38] 18  SpO2:  [91 %-100 %] 99 %  BP: ()/(54-59) 136/55  Arterial Line BP: (114-141)/(49-71) 130/57     Weight: 93 kg (205 lb 0.4 oz)  Body mass index is 32.11 kg/m².    Estimated Creatinine Clearance: 37.4 mL/min (A) (based on SCr of 2 mg/dL (H)).    Physical Exam  Constitutional:       General: He is not in acute distress.     Appearance: He is ill-appearing. He is not toxic-appearing or diaphoretic.   HENT:      Head: Normocephalic and atraumatic.      Right Ear: External ear normal.      Left Ear: External ear normal.      Mouth/Throat:      Comments: ETT  Eyes:      General:         Right eye: No discharge.         Left eye: No discharge.   Cardiovascular:      Rate and Rhythm: Normal rate and regular rhythm.   Pulmonary:      Effort: Pulmonary effort is normal. No respiratory distress.      Breath sounds: No wheezing, rhonchi or rales.   Abdominal:      General: Bowel sounds are  normal. There is no distension.      Palpations: Abdomen is soft.      Tenderness: There is no abdominal tenderness. There is no guarding.   Genitourinary:     Comments: valentin  Musculoskeletal:      Right lower leg: No edema.      Left lower leg: No edema.   Skin:     General: Skin is warm and dry.      Findings: No erythema or rash.      Comments: Prior PM site - dressed       Significant Labs:   Microbiology Results (last 7 days)       Procedure Component Value Units Date/Time    Culture, Respiratory with Gram Stain [[id number]] Collected: 10/01/22 1041    Order Status: Completed Specimen: Respiratory from Endotracheal Aspirate Updated: 10/02/22 1041     Respiratory Culture Insufficient incubation, culture in progress     Gram Stain (Respiratory) <10 epithelial cells per low power field.     Gram Stain (Respiratory) Rare WBC's     Gram Stain (Respiratory) No organisms seen    Blood culture [[id number]] Collected: 09/30/22 0241    Order Status: Completed Specimen: Blood from Peripheral, Hand, Right Updated: 10/02/22 0613     Blood Culture, Routine No growth to date      No Growth to date    Blood culture [[id number]] Collected: 09/30/22 0243    Order Status: Completed Specimen: Blood from Peripheral, Antecubital, Right Updated: 10/02/22 0612     Blood Culture, Routine No growth to date      No Growth to date    Aerobic culture [[id number]] Collected: 09/27/22 1133    Order Status: Completed Specimen: Abscess from Heart Updated: 09/30/22 1153     Aerobic Bacterial Culture No growth    Narrative:      RA Lead Tip    Aerobic culture [[id number]] Collected: 09/27/22 0952    Order Status: Completed Specimen: Abscess from Chest, Left Updated: 09/30/22 1153     Aerobic Bacterial Culture No growth    Narrative:      Deep Pocket #2    Aerobic culture [[id number]] Collected: 09/27/22 1032    Order Status: Completed Specimen: Abscess from Heart Updated: 09/30/22 1153     Aerobic Bacterial Culture No growth    Narrative:      RA  Lead drainage #1    Aerobic culture [[id number]] Collected: 09/27/22 0947    Order Status: Completed Specimen: Abscess from Chest, Left Updated: 09/30/22 1153     Aerobic Bacterial Culture No growth    Narrative:      Deep Pocket #1    Aerobic culture [[id number]] Collected: 09/27/22 1026    Order Status: Completed Specimen: Abscess from Heart Updated: 09/30/22 1153     Aerobic Bacterial Culture No growth    Narrative:      LV Lead Tip    Aerobic culture [[id number]] Collected: 09/27/22 1035    Order Status: Completed Specimen: Abscess from Heart Updated: 09/30/22 1153     Aerobic Bacterial Culture No growth    Narrative:      RA Lead drainage #2    Aerobic culture [[id number]] Collected: 09/27/22 1138    Order Status: Completed Specimen: Abscess from Heart Updated: 09/30/22 1153     Aerobic Bacterial Culture No growth    Narrative:      RV Lead Tip    Aerobic culture [[id number]] Collected: 09/27/22 0936    Order Status: Completed Specimen: Abscess from Chest, Left Updated: 09/30/22 1153     Aerobic Bacterial Culture No growth    Narrative:      Shallow pocket    Culture, Anaerobe [[id number]] Collected: 09/27/22 1138    Order Status: Completed Specimen: Abscess from Heart Updated: 09/29/22 1324     Anaerobic Culture Culture in progress    Narrative:      RV Lead Tip    Culture, Anaerobe [[id number]] Collected: 09/27/22 1035    Order Status: Completed Specimen: Abscess from Heart Updated: 09/29/22 1323     Anaerobic Culture Culture in progress    Narrative:      RA Lead drainage #2    Culture, Anaerobe [[id number]] Collected: 09/27/22 1032    Order Status: Completed Specimen: Abscess from Heart Updated: 09/29/22 1322     Anaerobic Culture Culture in progress    Narrative:      RA Lead drainage #1    Culture, Anaerobe [[id number]] Collected: 09/27/22 0936    Order Status: Completed Specimen: Abscess from Chest, Left Updated: 09/29/22 1322     Anaerobic Culture Culture in progress    Narrative:      Shallow Pocket    Culture,  Anaerobe [[id number]] Collected: 09/27/22 1133    Order Status: Completed Specimen: Abscess from Heart Updated: 09/29/22 1321     Anaerobic Culture Culture in progress    Narrative:      RA Lead Tip    Culture, Anaerobe [[id number]] Collected: 09/27/22 1026    Order Status: Completed Specimen: Abscess from Heart Updated: 09/29/22 1320     Anaerobic Culture Culture in progress    Narrative:      LV Lead Tip    Culture, Anaerobe [[id number]] Collected: 09/27/22 0952    Order Status: Completed Specimen: Abscess from Chest, Left Updated: 09/29/22 1320     Anaerobic Culture Culture in progress    Narrative:      Deep Pocket #2    Culture, Anaerobe [[id number]] Collected: 09/27/22 0947    Order Status: Completed Specimen: Abscess from Chest, Left Updated: 09/29/22 1319     Anaerobic Culture Culture in progress    Narrative:      Deep Pocket #1    Culture, Anaerobe [[id number]]     Order Status: No result Specimen: Abscess from Heart     Aerobic culture [[id number]]     Order Status: No result Specimen: Abscess from Heart             Significant Imaging: I have reviewed all pertinent imaging results/findings within the past 24 hours.

## 2022-10-03 NOTE — SUBJECTIVE & OBJECTIVE
Interval History: Pt extubated on 10/1/22 and doing well at this time on 2l NC satting at 96%. On Dobutmine 2.5( baseline) and off pressors.   Plan for EUS with biopsy of Pancreatic lesion tomorrow. Will keep NPO past midnight and Hold heparin at 3 am 10/4/22.   Continuous Infusions:   sodium chloride 0.9% Stopped (09/28/22 1550)    sodium chloride 0.9% 5 mL/hr at 10/03/22 0445    DOBUTamine IV infusion (non-titrating) 2.5 mcg/kg/min (10/03/22 0505)    EPINEPHrine Stopped (09/30/22 0207)    heparin (porcine) in D5W 18 Units/kg/hr (10/03/22 0505)     Scheduled Meds:   acetaminophen  650 mg Oral QID    allopurinoL  200 mg Oral Daily    amiodarone  300 mg Oral Daily    aspirin  81 mg Oral Daily    atorvastatin  80 mg Oral Daily    DAPTOmycin (CUBICIN) IV (PEDS and ADULTS)  10 mg/kg Intravenous Q24H    famotidine (PF)  20 mg Intravenous Daily    LIDOcaine  1 patch Transdermal Q24H    piperacillin-tazobactam (ZOSYN) IVPB  4.5 g Intravenous Q8H    polyethylene glycol  17 g Oral Daily    predniSONE  30 mg Oral Daily     PRN Meds:sodium chloride, acetaminophen, dextromethorphan-guaiFENesin  mg, heparin (PORCINE), heparin (PORCINE), HYDROcodone-acetaminophen, melatonin, methocarbamoL, ondansetron, senna-docusate 8.6-50 mg, sodium chloride 0.9%    Review of patient's allergies indicates:   Allergen Reactions    Iodine containing multivitamin Swelling     itching    Keflex [cephalexin] Swelling     Eyes.  Tolerated multiple doses of zosyn and 1 dose of augmentin in 2015 and 2016, respectively    Peaches [peach (prunus persica)] Swelling     eyes    Shellfish containing products Swelling    Fig tree Swelling     itching    Tuberculin spenser test ppd Rash     Objective:     Vital Signs (Most Recent):  Temp: 98.4 °F (36.9 °C) (10/03/22 0700)  Pulse: (!) 115 (10/03/22 0826)  Resp: (!) 45 (10/03/22 0826)  BP: (!) 136/57 (10/03/22 0305)  SpO2: 99 % (10/03/22 0826)   Vital Signs (24h Range):  Temp:  [97.6 °F (36.4 °C)-98.6 °F (37  °C)] 98.4 °F (36.9 °C)  Pulse:  [] 115  Resp:  [14-45] 45  SpO2:  [91 %-100 %] 99 %  BP: (102-136)/(54-57) 136/57  Arterial Line BP: (115-141)/(49-71) 118/55     Patient Vitals for the past 72 hrs (Last 3 readings):   Weight   10/02/22 0600 93 kg (205 lb 0.4 oz)       Body mass index is 32.11 kg/m².      Intake/Output Summary (Last 24 hours) at 10/3/2022 1027  Last data filed at 10/3/2022 0902  Gross per 24 hour   Intake 741.6 ml   Output 1175 ml   Net -433.4 ml           Physical Exam  Constitutional:       General: He is not in acute distress.     Appearance: Normal appearance. He is normal weight. He is not ill-appearing or toxic-appearing.   HENT:      Head: Normocephalic and atraumatic.      Nose: Nose normal. No congestion.      Mouth/Throat:      Mouth: Mucous membranes are moist.      Pharynx: No oropharyngeal exudate.   Eyes:      General:         Right eye: No discharge.         Left eye: No discharge.      Extraocular Movements: Extraocular movements intact.      Pupils: Pupils are equal, round, and reactive to light.   Cardiovascular:      Rate and Rhythm: Normal rate and regular rhythm.      Heart sounds: No murmur heard.    No friction rub. No gallop.   Pulmonary:      Effort: Pulmonary effort is normal. No respiratory distress.      Breath sounds: Normal breath sounds. No wheezing, rhonchi or rales.   Abdominal:      General: Abdomen is flat. Bowel sounds are normal. There is no distension.      Palpations: Abdomen is soft.      Tenderness: There is no abdominal tenderness.   Musculoskeletal:         General: No swelling. Normal range of motion.      Cervical back: Normal range of motion. No rigidity.      Right lower leg: No edema.      Left lower leg: No edema.   Skin:     General: Skin is warm and dry.      Findings: No lesion or rash.   Neurological:      General: No focal deficit present.      Mental Status: He is alert and oriented to person, place, and time.      Cranial Nerves: No  cranial nerve deficit.      Motor: No weakness.       Significant Labs:  CBC:  Recent Labs   Lab 10/01/22  1547 10/02/22  0351 10/03/22  0217   WBC 7.69 6.88 6.66   RBC 3.34* 3.17* 3.32*   HGB 9.4* 8.9* 9.2*   HCT 28.1* 26.8* 29.1*    289 312   MCV 84 85 88   MCH 28.1 28.1 27.7   MCHC 33.5 33.2 31.6*       BNP:  No results for input(s): BNP in the last 168 hours.    Invalid input(s): BNPTRIAGELBLO  CMP:  Recent Labs   Lab 10/01/22  1139 10/01/22  2246 10/02/22  0351 10/03/22  0506    141* 105 93   CALCIUM 8.8 8.9 8.5* 9.1   ALBUMIN 3.0* 3.1* 2.9*  --    PROT 6.5 6.7 6.5  --    * 135* 135* 136   K 3.2* 3.8 3.7 3.6   CO2 19* 21* 22* 24   CL 98 99 98 99   BUN 33* 31* 29* 27*   CREATININE 2.0* 2.1* 2.0* 2.2*   ALKPHOS 80 81 83  --    ALT 13 13 13  --    AST 13 15 15  --    BILITOT 0.9 0.8 0.8  --         Coagulation:   Recent Labs   Lab 10/01/22  1547 10/01/22  2246 10/02/22  1824 10/03/22  0217 10/03/22  0837   INR 1.0  --   --   --   --    APTT 28.7   < > 38.4* 50.7* 45.8*    < > = values in this interval not displayed.       LDH:  No results for input(s): LDH in the last 72 hours.  Microbiology:  Microbiology Results (last 7 days)       Procedure Component Value Units Date/Time    Blood culture [[id number]] Collected: 09/30/22 0241    Order Status: Completed Specimen: Blood from Peripheral, Hand, Right Updated: 10/03/22 0612     Blood Culture, Routine No growth to date      No Growth to date      No Growth to date    Blood culture [[id number]] Collected: 09/30/22 0243    Order Status: Completed Specimen: Blood from Peripheral, Antecubital, Right Updated: 10/03/22 0612     Blood Culture, Routine No growth to date      No Growth to date      No Growth to date    Culture, Respiratory with Gram Stain [[id number]] Collected: 10/01/22 1041    Order Status: Completed Specimen: Respiratory from Endotracheal Aspirate Updated: 10/02/22 1041     Respiratory Culture Insufficient incubation, culture in progress      Gram Stain (Respiratory) <10 epithelial cells per low power field.     Gram Stain (Respiratory) Rare WBC's     Gram Stain (Respiratory) No organisms seen    Aerobic culture [[id number]] Collected: 09/27/22 1133    Order Status: Completed Specimen: Abscess from Heart Updated: 09/30/22 1153     Aerobic Bacterial Culture No growth    Narrative:      RA Lead Tip    Aerobic culture [[id number]] Collected: 09/27/22 0952    Order Status: Completed Specimen: Abscess from Chest, Left Updated: 09/30/22 1153     Aerobic Bacterial Culture No growth    Narrative:      Deep Pocket #2    Aerobic culture [[id number]] Collected: 09/27/22 1032    Order Status: Completed Specimen: Abscess from Heart Updated: 09/30/22 1153     Aerobic Bacterial Culture No growth    Narrative:      RA Lead drainage #1    Aerobic culture [[id number]] Collected: 09/27/22 0947    Order Status: Completed Specimen: Abscess from Chest, Left Updated: 09/30/22 1153     Aerobic Bacterial Culture No growth    Narrative:      Deep Pocket #1    Aerobic culture [[id number]] Collected: 09/27/22 1026    Order Status: Completed Specimen: Abscess from Heart Updated: 09/30/22 1153     Aerobic Bacterial Culture No growth    Narrative:      LV Lead Tip    Aerobic culture [[id number]] Collected: 09/27/22 1035    Order Status: Completed Specimen: Abscess from Heart Updated: 09/30/22 1153     Aerobic Bacterial Culture No growth    Narrative:      RA Lead drainage #2    Aerobic culture [[id number]] Collected: 09/27/22 1138    Order Status: Completed Specimen: Abscess from Heart Updated: 09/30/22 1153     Aerobic Bacterial Culture No growth    Narrative:      RV Lead Tip    Aerobic culture [[id number]] Collected: 09/27/22 0936    Order Status: Completed Specimen: Abscess from Chest, Left Updated: 09/30/22 1153     Aerobic Bacterial Culture No growth    Narrative:      Shallow pocket    Culture, Anaerobe [[id number]] Collected: 09/27/22 1138    Order Status: Completed Specimen:  Abscess from Heart Updated: 09/29/22 1324     Anaerobic Culture Culture in progress    Narrative:      RV Lead Tip    Culture, Anaerobe [[id number]] Collected: 09/27/22 1035    Order Status: Completed Specimen: Abscess from Heart Updated: 09/29/22 1323     Anaerobic Culture Culture in progress    Narrative:      RA Lead drainage #2    Culture, Anaerobe [[id number]] Collected: 09/27/22 1032    Order Status: Completed Specimen: Abscess from Heart Updated: 09/29/22 1322     Anaerobic Culture Culture in progress    Narrative:      RA Lead drainage #1    Culture, Anaerobe [[id number]] Collected: 09/27/22 0936    Order Status: Completed Specimen: Abscess from Chest, Left Updated: 09/29/22 1322     Anaerobic Culture Culture in progress    Narrative:      Shallow Pocket    Culture, Anaerobe [[id number]] Collected: 09/27/22 1133    Order Status: Completed Specimen: Abscess from Heart Updated: 09/29/22 1321     Anaerobic Culture Culture in progress    Narrative:      RA Lead Tip    Culture, Anaerobe [[id number]] Collected: 09/27/22 1026    Order Status: Completed Specimen: Abscess from Heart Updated: 09/29/22 1320     Anaerobic Culture Culture in progress    Narrative:      LV Lead Tip    Culture, Anaerobe [[id number]] Collected: 09/27/22 0952    Order Status: Completed Specimen: Abscess from Chest, Left Updated: 09/29/22 1320     Anaerobic Culture Culture in progress    Narrative:      Deep Pocket #2    Culture, Anaerobe [[id number]] Collected: 09/27/22 0947    Order Status: Completed Specimen: Abscess from Chest, Left Updated: 09/29/22 1319     Anaerobic Culture Culture in progress    Narrative:      Deep Pocket #1    Culture, Anaerobe [[id number]]     Order Status: No result Specimen: Abscess from Heart     Aerobic culture [[id number]]     Order Status: No result Specimen: Abscess from Heart             BMP:   Recent Labs   Lab 10/03/22  0506   GLU 93      K 3.6   CL 99   CO2 24   BUN 27*   CREATININE 2.2*   CALCIUM 9.1   MG  2.2       I have reviewed all pertinent labs within the past 24 hours.    Estimated Creatinine Clearance: 34 mL/min (A) (based on SCr of 2.2 mg/dL (H)).    Diagnostic Results:  Reviewed

## 2022-10-03 NOTE — PROGRESS NOTES
Update     Pt presents as AAO x4, calm, cooperative, and asking and answering questions appropriately, caregivers not present. Pt states he is doing well and had no further questions or concerns during visit. LCSW offered support and encouragement. SW providing ongoing psychosocial, counseling, & emotional support, education, resources, assistance, and discharge planning as indicated.  SW to continue to follow.

## 2022-10-03 NOTE — SUBJECTIVE & OBJECTIVE
Interval History:     No adverse events.    Review of Systems   Unable to perform ROS: Intubated   Objective:     Vital Signs (Most Recent):  Temp: 97.6 °F (36.4 °C) (10/02/22 2304)  Pulse: 97 (10/02/22 2304)  Resp: 18 (10/02/22 2304)  BP: (!) 136/55 (10/02/22 2304)  SpO2: 99 % (10/02/22 2304)   Vital Signs (24h Range):  Temp:  [97.6 °F (36.4 °C)-98.6 °F (37 °C)] 97.6 °F (36.4 °C)  Pulse:  [78-98] 97  Resp:  [18-38] 18  SpO2:  [91 %-100 %] 99 %  BP: ()/(54-59) 136/55  Arterial Line BP: (114-141)/(49-71) 130/57     Weight: 93 kg (205 lb 0.4 oz)  Body mass index is 32.11 kg/m².    Estimated Creatinine Clearance: 37.4 mL/min (A) (based on SCr of 2 mg/dL (H)).    Physical Exam  Constitutional:       General: He is not in acute distress.     Appearance: He is ill-appearing. He is not toxic-appearing or diaphoretic.   HENT:      Head: Normocephalic and atraumatic.      Right Ear: External ear normal.      Left Ear: External ear normal.      Mouth/Throat:      Comments: ETT  Eyes:      General:         Right eye: No discharge.         Left eye: No discharge.   Cardiovascular:      Rate and Rhythm: Normal rate and regular rhythm.   Pulmonary:      Effort: Pulmonary effort is normal. No respiratory distress.      Breath sounds: No wheezing, rhonchi or rales.   Abdominal:      General: Bowel sounds are normal. There is no distension.      Palpations: Abdomen is soft.      Tenderness: There is no abdominal tenderness. There is no guarding.   Genitourinary:     Comments: valentin  Musculoskeletal:      Right lower leg: No edema.      Left lower leg: No edema.   Skin:     General: Skin is warm and dry.      Findings: No erythema or rash.      Comments: Prior PM site - dressed       Significant Labs:   Microbiology Results (last 7 days)       Procedure Component Value Units Date/Time    Culture, Respiratory with Gram Stain [[id number]] Collected: 10/01/22 1041    Order Status: Completed Specimen: Respiratory from Endotracheal  Aspirate Updated: 10/02/22 1041     Respiratory Culture Insufficient incubation, culture in progress     Gram Stain (Respiratory) <10 epithelial cells per low power field.     Gram Stain (Respiratory) Rare WBC's     Gram Stain (Respiratory) No organisms seen    Blood culture [[id number]] Collected: 09/30/22 0241    Order Status: Completed Specimen: Blood from Peripheral, Hand, Right Updated: 10/02/22 0613     Blood Culture, Routine No growth to date      No Growth to date    Blood culture [[id number]] Collected: 09/30/22 0243    Order Status: Completed Specimen: Blood from Peripheral, Antecubital, Right Updated: 10/02/22 0612     Blood Culture, Routine No growth to date      No Growth to date    Aerobic culture [[id number]] Collected: 09/27/22 1133    Order Status: Completed Specimen: Abscess from Heart Updated: 09/30/22 1153     Aerobic Bacterial Culture No growth    Narrative:      RA Lead Tip    Aerobic culture [[id number]] Collected: 09/27/22 0952    Order Status: Completed Specimen: Abscess from Chest, Left Updated: 09/30/22 1153     Aerobic Bacterial Culture No growth    Narrative:      Deep Pocket #2    Aerobic culture [[id number]] Collected: 09/27/22 1032    Order Status: Completed Specimen: Abscess from Heart Updated: 09/30/22 1153     Aerobic Bacterial Culture No growth    Narrative:      RA Lead drainage #1    Aerobic culture [[id number]] Collected: 09/27/22 0947    Order Status: Completed Specimen: Abscess from Chest, Left Updated: 09/30/22 1153     Aerobic Bacterial Culture No growth    Narrative:      Deep Pocket #1    Aerobic culture [[id number]] Collected: 09/27/22 1026    Order Status: Completed Specimen: Abscess from Heart Updated: 09/30/22 1153     Aerobic Bacterial Culture No growth    Narrative:      LV Lead Tip    Aerobic culture [[id number]] Collected: 09/27/22 1035    Order Status: Completed Specimen: Abscess from Heart Updated: 09/30/22 1153     Aerobic Bacterial Culture No growth    Narrative:       RA Lead drainage #2    Aerobic culture [[id number]] Collected: 09/27/22 1138    Order Status: Completed Specimen: Abscess from Heart Updated: 09/30/22 1153     Aerobic Bacterial Culture No growth    Narrative:      RV Lead Tip    Aerobic culture [[id number]] Collected: 09/27/22 0936    Order Status: Completed Specimen: Abscess from Chest, Left Updated: 09/30/22 1153     Aerobic Bacterial Culture No growth    Narrative:      Shallow pocket    Culture, Anaerobe [[id number]] Collected: 09/27/22 1138    Order Status: Completed Specimen: Abscess from Heart Updated: 09/29/22 1324     Anaerobic Culture Culture in progress    Narrative:      RV Lead Tip    Culture, Anaerobe [[id number]] Collected: 09/27/22 1035    Order Status: Completed Specimen: Abscess from Heart Updated: 09/29/22 1323     Anaerobic Culture Culture in progress    Narrative:      RA Lead drainage #2    Culture, Anaerobe [[id number]] Collected: 09/27/22 1032    Order Status: Completed Specimen: Abscess from Heart Updated: 09/29/22 1322     Anaerobic Culture Culture in progress    Narrative:      RA Lead drainage #1    Culture, Anaerobe [[id number]] Collected: 09/27/22 0936    Order Status: Completed Specimen: Abscess from Chest, Left Updated: 09/29/22 1322     Anaerobic Culture Culture in progress    Narrative:      Shallow Pocket    Culture, Anaerobe [[id number]] Collected: 09/27/22 1133    Order Status: Completed Specimen: Abscess from Heart Updated: 09/29/22 1321     Anaerobic Culture Culture in progress    Narrative:      RA Lead Tip    Culture, Anaerobe [[id number]] Collected: 09/27/22 1026    Order Status: Completed Specimen: Abscess from Heart Updated: 09/29/22 1320     Anaerobic Culture Culture in progress    Narrative:      LV Lead Tip    Culture, Anaerobe [[id number]] Collected: 09/27/22 0952    Order Status: Completed Specimen: Abscess from Chest, Left Updated: 09/29/22 1320     Anaerobic Culture Culture in progress    Narrative:      Deep Pocket #2     Culture, Anaerobe [[id number]] Collected: 09/27/22 0947    Order Status: Completed Specimen: Abscess from Chest, Left Updated: 09/29/22 1319     Anaerobic Culture Culture in progress    Narrative:      Deep Pocket #1    Culture, Anaerobe [[id number]]     Order Status: No result Specimen: Abscess from Heart     Aerobic culture [[id number]]     Order Status: No result Specimen: Abscess from Heart             Significant Imaging: I have reviewed all pertinent imaging results/findings within the past 24 hours.

## 2022-10-03 NOTE — PLAN OF CARE
Hemodynamics:   Parameter   at 1900   CVP 9   SvO2 70   CO  9.10   CI 4.33      MAP 71     Current Heart Failure Medications:  - Dobutamine 2.5 mcg/kg/min    UOP:  UOP:  50cc total over 4 hours    Intake/Output Summary (Last 24 hours) at 10/2/2022 1947  Last data filed at 10/2/2022 1800  Gross per 24 hour   Intake 1147.35 ml   Output 1670 ml   Net -522.65 ml       Assessment/Plan:  - Plan was discussed with attending staff   - continue current management  - re-suturing RIYA Quintero MD  Cardiology Fellow PGY IV  Pager: [phone]

## 2022-10-03 NOTE — PLAN OF CARE
Problem: Occupational Therapy  Goal: Occupational Therapy Goal  Description: Goals to be met by: 10/13/22     Patient will increase functional independence with ADLs by performing:    UE Dressing with Supervision.  LE Dressing with Supervision.  Grooming while standing at sink with Supervision.  Toileting from toilet with Supervision for hygiene and clothing management.   Toilet transfer to toilet with Supervision.    10/3/2022 1249 by MYRTLE Jaramillo  Reactivnieves

## 2022-10-03 NOTE — PLAN OF CARE
Problem: Physical Therapy  Goal: Physical Therapy Goal  Description: Goals to be met by: 22    Patient will increase functional independence with mobility by performin. Sit to stand transfer with independence and maintaining sternal precautions- not met  2. Gait  x 300 feet with independence using LRAD as needed- not met  3. Ascend/descend 5 stair with bilateral handrails modified independence and maintaining sternal precautions using LRAD as needed- not met  4. Lower extremity exercise program x15 reps per handout, with independence- not met    Outcome: Ongoing, Progressing   Goals remain appropriate  10/3/2022

## 2022-10-03 NOTE — PLAN OF CARE
Cardiac ICU Care Plan    POC reviewed with Audrey Oneil Jr. Questions and concerns addressed. No acute events today. Pt progressing toward goals. See below and flowsheets for full assessment and VS info.       Neuro:  [place] Coma Scale  Best Eye Response: 4-->(E4) spontaneous  Best Motor Response: 6-->(M6) obeys commands  Best Verbal Response: 5-->(V5) oriented  [place] Coma Scale Score: 15  Assessment Qualifiers: patient not sedated/intubated  Pupil PERRLA: yes    24 hr Temp:  [97.6 °F (36.4 °C)-98.6 °F (37 °C)]      CV:  Rhythm: normal sinus rhythm   DVT prophylaxis: VTE Required Core Measure: Pharmacological prophylaxis initiated/maintained    CVP: 9, 9, 9    [REMOVED] PICC Double Lumen 09/22/22 1507 right basilic-Current Insertion Depth (cm): 36 cm (09/22/22 1507)  SVO2 (%): 62 % (10/01/22 0801)               Pulses  Right Radial Pulse: 2+ (normal)  Left Radial Pulse: 2+ (normal)  Right Dorsalis Pedis Pulse: 2+ (normal)  Left Dorsalis Pedis Pulse: 2+ (normal)  Right Posterior Tibial Pulse: 1+ (weak)  Left Posterior Tibial Pulse: 1+ (weak)    Resp:  O2 Device (Oxygen Therapy): nasal cannula  Flow (L/min): 3  Vent Mode: Spont  Set Rate: 20 BPM  Oxygen Concentration (%): 32  Vt Set: 500 mL  PEEP/CPAP: 5 cmH20  Pressure Support: 4 cmH20    GI/:  GI prophylaxis: no  Diet/Nutrition Received: 2 gram sodium, low saturated fat/low cholesterol  Last Bowel Movement: 10/02/22  Voiding Characteristics: voids spontaneously without difficulty  [REMOVED]      Urethral Catheter 09/29/22 1600-Reason for Continuing Urinary Catheterization: Critically ill in ICU and requiring hourly monitoring of intake/output   Intake/Output Summary (Last 24 hours) at 10/3/2022 0544  Last data filed at 10/3/2022 0505  Gross per 24 hour   Intake 1068.6 ml   Output 1230 ml   Net -161.4 ml            Labs/Accuchecks:  Recent Labs   Lab 10/01/22  1547 10/02/22  0351 10/03/22  0217   WBC 7.69 6.88 6.66   RBC 3.34* 3.17* 3.32*   HGB 9.4* 8.9* 9.2*    HCT 28.1* 26.8* 29.1*    289 312      Recent Labs   Lab 10/01/22  1547 10/01/22  2246 10/02/22  1153 10/02/22  1824 10/03/22  0217   INR 1.0  --   --   --   --    APTT 28.7   < > 41.6* 38.4* 50.7*    < > = values in this interval not displayed.      Recent Labs     10/02/22  0351   *   K 3.7   CO2 22*   CL 98   BUN 29*   CREATININE 2.0*   ALKPHOS 83   ALT 13   AST 15   BILITOT 0.8       Recent Labs   Lab 09/30/22  0421   CPK 60      Recent Labs     10/02/22  0350 10/02/22  1954 10/03/22  0420   PH 7.402 7.388 7.375   PCO2 41.6 49.2* 50.7*   PO2 35* 37* 45   HCO3 25.9 29.7* 29.7*   POCSATURATED 68* 70* 79*   BE 1 5 4       Electrolytes: No replacement orders  Accuchecks: none    Gtts/LDAs:   sodium chloride 0.9% Stopped (09/28/22 1550)    sodium chloride 0.9% 5 mL/hr at 10/03/22 0445    DOBUTamine IV infusion (non-titrating) 2.5 mcg/kg/min (10/03/22 0505)    EPINEPHrine Stopped (09/30/22 0207)    heparin (porcine) in D5W 18 Units/kg/hr (10/03/22 0505)       Lines/Drains/Airways       Central Venous Catheter Line  Duration             Percutaneous Central Line Insertion/Assessment - Triple Lumen  09/27/22 0745 left internal jugular 5 days              Peripheral Intravenous Line  Duration                  Peripheral IV - Single Lumen 10/01/22 0000 20 G Anterior;Right Shoulder 2 days         Peripheral IV - Single Lumen 10/01/22 0000 20 G Anterior;Right Wrist 2 days                    Skin/Wounds  Bathing/Skin Care: bath, complete;dressed/undressed;linen changed (10/02/22 1905)  Wounds: No  Wound care consulted: No

## 2022-10-03 NOTE — ANESTHESIA PREPROCEDURE EVALUATION
[facility]  Anesthesia Pre-Operative Evaluation        Patient Name: Audrey Oneil Jr.  YOB: 1950  MRN: [id number]    SUBJECTIVE:     Pre-operative Evaluation for Procedure(s) (LRB):  ULTRASOUND, UPPER GI TRACT, ENDOSCOPIC (N/A)     10/03/2022    Audrey Oneil Jr. is a 72 y.o. male with a PMHx significant for HFrEF (EF 15%, s/p CRT-D, on home ionotropes), CAD s/p PCI, history of VT, HLD, CKD who presents for abdominal pain, found to be in decompensated CHF as well as MRSA bacteremia. Admitted to  for LVAD evaluation.     He now presents for the above procedure(s).    LDA:   Percutaneous Central Line Insertion/Assessment - Triple Lumen  09/27/22 0745 left internal jugular (Active)   Line Necessity Review Hemodynamic instability 10/01/22 0701   Site Assessment No drainage;No redness;No swelling;No warmth 10/03/22 0800   Line Securement Device Secured with sutures 10/03/22 0800   Dressing Type Biopatch in place;Central line dressing;Transparent (Tegaderm) 10/03/22 1200   Dressing Status Clean;Dry;Intact 10/03/22 1200   Dressing Intervention Integrity maintained 10/03/22 1200   Date on Dressing 10/02/22 10/02/22 2305   Dressing Due to be Changed 10/09/22 10/02/22 2305   Distal Patency/Care infusing 10/03/22 1200   Medial 1 Patency/Care infusing 10/03/22 1200   Proximal 1 Patency/Care infusing 10/03/22 1200   Waveform Normal 10/02/22 1500   Number of days: 6            Peripheral IV - Single Lumen 10/01/22 0000 20 G Anterior;Right Shoulder (Active)   Site Assessment Clean;Dry;Intact;No redness;No swelling 10/03/22 1200   Extremity Assessment Distal to IV No abnormal discoloration 10/03/22 1200   Line Status Saline locked;Flushed 10/03/22 1200   Dressing Status Clean;Dry;Intact 10/03/22 1200   Dressing Intervention Integrity maintained 10/03/22 1200   Dressing Change Due 10/05/22 10/03/22 0305   Site Change Due 10/05/22 10/02/22 1905   Reason Not Rotated Not due 10/03/22 0305   Number  of days: 2            Peripheral IV - Single Lumen 10/01/22 0000 20 G Anterior;Right Wrist (Active)   Site Assessment Clean;Dry;Intact;No redness;No swelling 10/03/22 1200   Extremity Assessment Distal to IV No abnormal discoloration 10/03/22 1200   Line Status Flushed;Saline locked 10/03/22 1200   Dressing Status Clean;Dry;Intact 10/03/22 1200   Dressing Intervention Integrity maintained 10/03/22 1200   Dressing Change Due 10/05/22 10/03/22 0305   Site Change Due 10/05/22 10/02/22 1905   Reason Not Rotated Not due 10/03/22 0305   Number of days: 2       Previous Airway (9/27/22):     Intubated:  Postinduction    Mask Ventilation:  Easy mask    Attempts:  1    Attempted By:  Student    Method of Intubation:  Direct    Blade:  Phipps 2    Laryngeal View Grade: Grade IIA - cords partially seen      Difficult Airway Encountered?: No      Complications:  None    Airway Device:  Oral endotracheal tube    Airway Device Size:  7.5    Style/Cuff Inflation:  Cuffed    Inflation Amount (mL):  5    Tube secured:  23    Secured at:  The lips    Placement Verified By:  Capnometry    Complicating Factors:  None    Findings Post-Intubation:  BS equal bilateral and atraumatic/condition of teeth unchanged      Drips:    sodium chloride 0.9% Stopped (09/28/22 1550)    sodium chloride 0.9% 5 mL/hr at 10/03/22 0445    DOBUTamine IV infusion (non-titrating) 2.5 mcg/kg/min (10/03/22 0505)    EPINEPHrine Stopped (09/30/22 0207)    heparin (porcine) in D5W 18 Units/kg/hr (10/03/22 0505)       Patient Active Problem List   Diagnosis    Coronary artery disease involving native coronary artery of native heart without angina pectoris    Chronic combined systolic and diastolic heart failure    Obesity (BMI 30.0-34.9)    Cardiomyopathy, ischemic    Hx pulmonary embolism 2010 provoked dvt     Postural dizziness with presyncope    Acute hypoxemic respiratory failure    History of DVT (deep vein thrombosis)    Ventricular tachycardia     Hypertensive cardiovascular-renal disease, stage 1-4 or unspecified chronic kidney disease, with heart failure    Presence of cardiac resynchronization therapy defibrillator (CRT-D)    Mixed hyperlipidemia    Long term current use of amiodarone    Thoracic aortic atherosclerosis    Stage 3a chronic kidney disease    Prediabetes    LBBB (left bundle branch block)    Elevated troponin I level    Gout    Acute on chronic combined systolic and diastolic heart failure    H/O ventricular tachycardia    Hypoxia    Vitamin D deficiency    Blue skin    MRSA infection    Rotator cuff syndrome    Severe sepsis    Pressure injury of heel, stage 2    Dermatitis associated with moisture    Skin breakdown    Leukocytosis    Bacteremia due to methicillin resistant Staphylococcus aureus    MRSA bacteremia    Pancreatic lesion    Cellulitis of left forearm    Acute thrombosis of left brachial vein    Vasogenic shock    SSS (sick sinus syndrome)    Ischemic cardiomyopathy    Encounter for weaning from ventilator       Review of patient's allergies indicates:   Allergen Reactions    Iodine containing multivitamin Swelling     itching    Keflex [cephalexin] Swelling     Eyes.  Tolerated multiple doses of zosyn and 1 dose of augmentin in 2015 and 2016, respectively    Peaches [peach (prunus persica)] Swelling     eyes    Shellfish containing products Swelling    Fig tree Swelling     itching    Tuberculin spenser test ppd Rash       Current Outpatient Medications   Medication Instructions    allopurinoL (ZYLOPRIM) 200 mg, Oral, Daily    amiodarone (PACERONE) 200 MG Tab TAKE 1 AND 1/2 TABLETS(300 MG) BY MOUTH EVERY DAY    aspirin (ECOTRIN) 81 mg, Oral, Daily    atorvastatin (LIPITOR) 80 mg, Oral, Daily    DOBUTamine (DOBUTREX) 500 mg/250 mL (2,000 mcg/mL) 2.5 mcg/kg/min<BR>Patient is 95.7 kg    furosemide (LASIX) 40 mg, Oral, Daily PRN    HYDROcodone-acetaminophen (NORCO)  mg per tablet 1  tablet, Oral, Every 6 hours    JARDIANCE 25 mg, Oral, Daily    ondansetron (ZOFRAN-ODT) 4 MG TbDL Dissolve 1 tablet (4 mg total) by mouth every 6 (six) hours as needed (nausea).    polyethylene glycol (GOLYTELY) 236-22.74-6.74 -5.86 gram suspension SMARTSIG:Milliliter(s) By Mouth    sacubitriL-valsartan (ENTRESTO) 49-51 mg per tablet 1 tablet, Oral, 2 times daily    simethicone (MYLICON) 80 mg, Oral, Every 6 hours PRN    spironolactone (ALDACTONE) 25 mg, Oral, Daily       Past Surgical History:   Procedure Laterality Date    APPENDECTOMY      BACK SURGERY      CARDIAC DEFIBRILLATOR PLACEMENT      CARDIAC SURGERY  2007    stent    CARPAL TUNNEL RELEASE Right 04/2018    COLONOSCOPY N/A 8/8/2022    Procedure: COLONOSCOPY;  Surgeon: Sascha Keenan MD;  Location: [facility] ([address] [place] [address]);  Service: Endoscopy;  Laterality: N/A;  EF-15%  pre heart    AICD-St Rodri       COVID test [facility] 8/5-inst mail-am prep-clears 4 hrs prior-tb    INSERTION OF BIVENTRICULAR IMPLANTABLE CARDIOVERTER-DEFIBRILLATOR (ICD) N/A 5/3/2019    Procedure: INSERTION, ICD, BIVENTRICULAR;  Surgeon: Teofilo Pal MD;  Location: [facility] EP [facility];  Service: Cardiology;  Laterality: N/A;  ICH CM,  ICD UPGD BI-V,  SJM, MAC, FAS, 3PREP (dual ICD INSITU)    r knee scope      REVISION OF SKIN POCKET FOR CARDIOVERTER-DEFIBRILLATOR Left 5/3/2019    Procedure: Revision, Skin Pocket, For Cardioverter-Defibrillator;  Surgeon: Teofilo Pal MD;  Location: [facility] EP [facility];  Service: Cardiology;  Laterality: Left;    RIGHT HEART CATHETERIZATION Right 6/14/2021    Procedure: INSERTION, CATHETER, RIGHT HEART;  Surgeon: Lizz Nieto MD;  Location: [facility] CATH LAB;  Service: Cardiology;  Laterality: Right;    RIGHT HEART CATHETERIZATION Right 6/17/2022    Procedure: INSERTION, CATHETER, RIGHT HEART;  Surgeon: Migue Henry Jr., MD;  Location: [facility] CATH LAB;  Service: Cardiology;  Laterality: Right;    SPINE SURGERY      TONSILLECTOMY       TRIGGER FINGER RELEASE Right 04/2018    thumb       Social History     Substance and Sexual Activity   Drug Use No     Alcohol Use: Not on file     Tobacco Use: Medium Risk    Smoking Tobacco Use: Former    Smokeless Tobacco Use: Never    Passive Exposure: Not on file       OBJECTIVE:     Vital Signs Range (Last 24H):  Temp:  [36.4 °C (97.6 °F)-37 °C (98.6 °F)]   Pulse:  []   Resp:  [14-45]   BP: (102-136)/(54-57)   SpO2:  [97 %-100 %]   Arterial Line BP: (115-141)/(49-71)       Significant Labs    Heme Profile  Lab Results   Component Value Date    WBC 6.66 10/03/2022    HGB 9.2 (L) 10/03/2022    HCT 29.1 (L) 10/03/2022     10/03/2022       Coagulation Studies  Lab Results   Component Value Date    LABPROT 10.3 10/01/2022    INR 1.0 10/01/2022    APTT 45.8 (H) 10/03/2022       BMP  Lab Results   Component Value Date     10/03/2022    K 3.6 10/03/2022    CL 99 10/03/2022    CO2 24 10/03/2022    BUN 27 (H) 10/03/2022    CREATININE 2.2 (H) 10/03/2022    MG 2.2 10/03/2022    PHOS 4.5 09/15/2022       Liver Function Tests  Lab Results   Component Value Date    AST 15 10/02/2022    ALT 13 10/02/2022    ALKPHOS 83 10/02/2022    BILITOT 0.8 10/02/2022    PROT 6.5 10/02/2022    ALBUMIN 2.9 (L) 10/02/2022       Lipid Profile  Lab Results   Component Value Date    CHOL 145 05/18/2022    HDL 50 05/18/2022    TRIG 186 (H) 05/18/2022       Endocrine Profile  Lab Results   Component Value Date    HGBA1C 5.6 05/28/2022    TSH 2.834 06/22/2022       Cardiac Studies    EKG:   Results for orders placed or performed during the hospital encounter of 09/14/22   EKG 12-lead    Collection Time: 10/02/22  2:52 AM    Narrative    Test Reason :     Vent. Rate : 105 BPM     Atrial Rate : 108 BPM     P-R Int : 000 ms          QRS Dur : 198 ms      QT Int : 434 ms       P-R-T Axes : 000 -54 094 degrees     QTc Int : 573 ms    Sinus tachycardia  Left axis deviation  Left bundle branch block  Abnormal ECG  When compared with  ECG of 30-SEP-2022 18:29,  QRS duration has increased  Confirmed by Vidal Sena MD ([id number]) on 10/2/2022 8:31:06 AM    Referred By: ASHLEYERR   SELF           Confirmed By:Vidal Sena MD       TTE:  Results for orders placed during the hospital encounter of 09/14/22  Interpretation Summary  · Moderate left atrial enlargement.  · The left ventricle is severely enlarged with eccentric hypertrophy and severely decreased systolic function.  · There is severe left ventricular global hypokinesis with anterospetal and apical scar. The estimated ejection fraction is 10-15%.  · Left ventricular diastolic dysfunction.  · Normal right ventricular size with normal right ventricular systolic function.  · Mild mitral regurgitation.  · Normal central venous pressure (3 mmHg).  · There is no significant tricuspid regurgitation and therefore the pulmonary artery systolic pressure cannot be reported.      HARRY  Results for orders placed during the hospital encounter of 09/14/22  Interpretation Summary  · The left ventricle is mildly enlarged with severely decreased systolic function. The estimated ejection fraction is 15%.  · Normal right ventricular size with normal right ventricular systolic function.  · No interatrial septal defect present.  · Mild mitral regurgitation.  · Normal appearing left atrial appendage. No thrombus is present in the appendage. Normal appendage velocities.  · Trivial pericardial effusion at baseline and unchanged following successful extraction of 3 pacemaker leads.      Cardiac Catheterization:  Results for orders placed during the hospital encounter of 06/17/22  Conclusion  · The estimated blood loss was <50 mL.    RHC performed on  2.5 mcg/kg/min.:  Right atrial pressure is upper normal.  Pulmonary capillary wedge pressure is moderately elevated.  Pulmonary artery pressure is mildly elevated.  Pulmonary vascular resistance is normal.  Johnna cardiac output and index is normal on  2.5  mcg/kg/min.  Systemic vascular resistance is 1300.    I certify that I was present for catheter insertion, catheter manipulation and hemodynamic interpretation of this patient.    The procedure log was documented by Documenter: Lupe Phipps and verified by Migue Henry Jr, MD.    Date: 6/17/2022  Time: 2:36 PM      Cardiac Device Check   Results for orders placed in visit on 08/05/19  Cardiac device check - In Clinic & Hospital  Results for orders placed in visit on 08/10/22    Cardiac device check - Remote  Narrative  Additional Comments  REMOTE Interrogation Report    Presenting E gram demonstrates:  As/Bp.    Device fxn WNL.    Autocapture algorithms are RA, RV, LV (ON).    RA pacing  0%.    Bi-V pacing  100%.    Atrial arrhythmias:  None.    Ventricular arrhythmias:  None.    Battery Status/Longevity:  3.8-4.2 yrs.    F/U via remote interrogation in 3 months.    Routine in clinic check in March.      Report prepared by JARVIS Machado      ASSESSMENT/PLAN:    10/03/2022  Audrey Oneil Jr. is a 72 y.o., male.      Pre-op Assessment    I have reviewed the Patient Summary Reports.     I have reviewed the Nursing Notes. I have reviewed the NPO Status.   I have reviewed the Medications.     Review of Systems  Anesthesia Hx:  Patient states that he had an issue with low BP during last couple of procedures  History of prior surgery of interest to airway management or planning: Personal Hx of Anesthesia complications (Patient states that he had an issue with low BP during last couple of procedures )   Social:  Former Smoker    Hematology/Oncology:     Oncology Normal     Cardiovascular:   Exercise tolerance: poor Pacemaker Hypertension Past MI CAD  CABG/stent Dysrhythmias atrial fibrillation CHF hyperlipidemia    Pulmonary:  Pulmonary Normal    Renal/:   Chronic Renal Disease, CRI    Hepatic/GI:  Hepatic/GI Normal    Neurological:  Neurology Normal    Endocrine:  Obesity / BMI > 30  Psych:  Psychiatric Normal            Physical Exam  General: Well nourished, Cooperative, Alert and Oriented    Airway:  Mallampati: III / II  Mouth Opening: Small, but > 3cm  TM Distance: Normal  Tongue: Normal  Neck ROM: Normal ROM    Dental:  Periodontal disease, Partial Dentures        Anesthesia Plan  Type of Anesthesia, risks & benefits discussed:    Anesthesia Type: Gen ETT, Gen Supraglottic Airway, Gen Natural Airway, MAC  Intra-op Monitoring Plan: Standard ASA Monitors  Post Op Pain Control Plan: multimodal analgesia and IV/PO Opioids PRN  Induction:  IV  Airway Plan: Direct, Post-Induction  Informed Consent: Informed consent signed with the Patient and all parties understand the risks and agree with anesthesia plan.  All questions answered.   ASA Score: 4  Day of Surgery Review of History & Physical: H&P Update referred to the surgeon/provider.I have interviewed and examined the patient. I have reviewed the patient's H&P dated: There are no significant changes.     Ready For Surgery From Anesthesia Perspective.     .

## 2022-10-04 NOTE — SUBJECTIVE & OBJECTIVE
Interval History: Pt extubated on 10/1/22 and doing well at this time on 2l NC satting at 96%. On Dobutmine 2.5( baseline) and off pressors.   Plan for EUS with biopsy of Pancreatic lesion today.   Continuous Infusions:   sodium chloride 0.9% Stopped (09/28/22 1550)    sodium chloride 0.9% 5 mL/hr at 10/03/22 0445    DOBUTamine IV infusion (non-titrating) 2.5 mcg/kg/min (10/04/22 0901)    EPINEPHrine Stopped (09/30/22 0207)    heparin (porcine) in D5W Stopped (10/04/22 0312)     Scheduled Meds:   acetaminophen  650 mg Oral QID    allopurinoL  200 mg Oral Daily    amiodarone  300 mg Oral Daily    aspirin  81 mg Oral Daily    atorvastatin  80 mg Oral Daily    DAPTOmycin (CUBICIN) IV (PEDS and ADULTS)  10 mg/kg Intravenous Q24H    famotidine (PF)  20 mg Intravenous Daily    LIDOcaine  1 patch Transdermal Q24H    piperacillin-tazobactam (ZOSYN) IVPB  4.5 g Intravenous Q8H    polyethylene glycol  17 g Oral Daily    potassium chloride  40 mEq Oral Once    predniSONE  30 mg Oral Daily     PRN Meds:sodium chloride, acetaminophen, dextromethorphan-guaiFENesin  mg, heparin (PORCINE), heparin (PORCINE), HYDROcodone-acetaminophen, melatonin, methocarbamoL, ondansetron, senna-docusate 8.6-50 mg, sodium chloride 0.9%    Review of patient's allergies indicates:   Allergen Reactions    Iodine containing multivitamin Swelling     itching    Keflex [cephalexin] Swelling     Eyes.  Tolerated multiple doses of zosyn and 1 dose of augmentin in 2015 and 2016, respectively    Peaches [peach (prunus persica)] Swelling     eyes    Shellfish containing products Swelling    Fig tree Swelling     itching    Tuberculin spenser test ppd Rash     Objective:     Vital Signs (Most Recent):  Temp: 98.1 °F (36.7 °C) (10/04/22 0300)  Pulse: 85 (10/04/22 0700)  Resp: (!) 34 (10/04/22 0700)  BP: (!) 136/57 (10/03/22 0305)  SpO2: (!) 93 % (10/04/22 0831)   Vital Signs (24h Range):  Temp:  [97.5 °F (36.4 °C)-98.4 °F (36.9 °C)] 98.1 °F (36.7 °C)  Pulse:   [83-99] 85  Resp:  [15-36] 34  SpO2:  [93 %-100 %] 93 %  Arterial Line BP: (119-151)/(53-71) 130/60     Patient Vitals for the past 72 hrs (Last 3 readings):   Weight   10/04/22 0500 88.5 kg (195 lb 1.7 oz)   10/02/22 0600 93 kg (205 lb 0.4 oz)       Body mass index is 30.56 kg/m².      Intake/Output Summary (Last 24 hours) at 10/4/2022 1107  Last data filed at 10/4/2022 0946  Gross per 24 hour   Intake 1330.29 ml   Output 920 ml   Net 410.29 ml           Physical Exam  Constitutional:       General: He is not in acute distress.     Appearance: Normal appearance. He is normal weight. He is not ill-appearing or toxic-appearing.   HENT:      Head: Normocephalic and atraumatic.      Nose: Nose normal. No congestion.      Mouth/Throat:      Mouth: Mucous membranes are moist.      Pharynx: No oropharyngeal exudate.   Eyes:      General:         Right eye: No discharge.         Left eye: No discharge.      Extraocular Movements: Extraocular movements intact.      Pupils: Pupils are equal, round, and reactive to light.   Cardiovascular:      Rate and Rhythm: Normal rate and regular rhythm.      Heart sounds: No murmur heard.    No friction rub. No gallop.   Pulmonary:      Effort: Pulmonary effort is normal. No respiratory distress.      Breath sounds: Normal breath sounds. No wheezing, rhonchi or rales.   Abdominal:      General: Abdomen is flat. Bowel sounds are normal. There is no distension.      Palpations: Abdomen is soft.      Tenderness: There is no abdominal tenderness.   Musculoskeletal:         General: No swelling. Normal range of motion.      Cervical back: Normal range of motion. No rigidity.      Right lower leg: No edema.      Left lower leg: No edema.   Skin:     General: Skin is warm and dry.      Findings: No lesion or rash.   Neurological:      General: No focal deficit present.      Mental Status: He is alert and oriented to person, place, and time.      Cranial Nerves: No cranial nerve deficit.       Motor: No weakness.       Significant Labs:  CBC:  Recent Labs   Lab 10/02/22  0351 10/03/22  0217 10/04/22  0354   WBC 6.88 6.66 5.60   RBC 3.17* 3.32* 3.19*   HGB 8.9* 9.2* 8.8*   HCT 26.8* 29.1* 27.7*    312 270   MCV 85 88 87   MCH 28.1 27.7 27.6   MCHC 33.2 31.6* 31.8*       BNP:  No results for input(s): BNP in the last 168 hours.    Invalid input(s): BNPTRIAGELBLO  CMP:  Recent Labs   Lab 10/01/22  1139 10/01/22  2246 10/02/22  0351 10/03/22  0506 10/04/22  0354    141* 105 93 122*   CALCIUM 8.8 8.9 8.5* 9.1 9.2   ALBUMIN 3.0* 3.1* 2.9*  --   --    PROT 6.5 6.7 6.5  --   --    * 135* 135* 136 136   K 3.2* 3.8 3.7 3.6 3.9   CO2 19* 21* 22* 24 23   CL 98 99 98 99 102   BUN 33* 31* 29* 27* 28*   CREATININE 2.0* 2.1* 2.0* 2.2* 1.7*   ALKPHOS 80 81 83  --   --    ALT 13 13 13  --   --    AST 13 15 15  --   --    BILITOT 0.9 0.8 0.8  --   --         Coagulation:   Recent Labs   Lab 10/01/22  1547 10/01/22  2246 10/03/22  0217 10/03/22  0837 10/04/22  0354   INR 1.0  --   --   --   --    APTT 28.7   < > 50.7* 45.8* 41.9*  41.9*    < > = values in this interval not displayed.       LDH:  No results for input(s): LDH in the last 72 hours.  Microbiology:  Microbiology Results (last 7 days)       Procedure Component Value Units Date/Time    Culture, Anaerobe [[id number]] Collected: 09/27/22 1026    Order Status: Completed Specimen: Abscess from Heart Updated: 10/04/22 0933     Anaerobic Culture No anaerobes isolated    Narrative:      LV Lead Tip    Culture, Anaerobe [[id number]] Collected: 09/27/22 1133    Order Status: Completed Specimen: Abscess from Heart Updated: 10/04/22 0933     Anaerobic Culture No anaerobes isolated    Narrative:      RA Lead Tip    Culture, Anaerobe [[id number]] Collected: 09/27/22 1032    Order Status: Completed Specimen: Abscess from Heart Updated: 10/04/22 0932     Anaerobic Culture No anaerobes isolated    Narrative:      RA Lead drainage #1    Culture, Anaerobe  [[id number]] Collected: 09/27/22 1035    Order Status: Completed Specimen: Abscess from Heart Updated: 10/04/22 0932     Anaerobic Culture No anaerobes isolated    Narrative:      RA Lead drainage #2    Culture, Anaerobe [[id number]] Collected: 09/27/22 0947    Order Status: Completed Specimen: Abscess from Chest, Left Updated: 10/04/22 0932     Anaerobic Culture No anaerobes isolated    Narrative:      Deep Pocket #1    Culture, Anaerobe [[id number]] Collected: 09/27/22 0952    Order Status: Completed Specimen: Abscess from Chest, Left Updated: 10/04/22 0932     Anaerobic Culture No anaerobes isolated    Narrative:      Deep Pocket #2    Culture, Anaerobe [[id number]] Collected: 09/27/22 0936    Order Status: Completed Specimen: Abscess from Chest, Left Updated: 10/04/22 0932     Anaerobic Culture No anaerobes isolated    Narrative:      Shallow Pocket    Blood culture [[id number]] Collected: 09/30/22 0241    Order Status: Completed Specimen: Blood from Peripheral, Hand, Right Updated: 10/04/22 0612     Blood Culture, Routine No growth to date      No Growth to date      No Growth to date      No Growth to date    Blood culture [[id number]] Collected: 09/30/22 0243    Order Status: Completed Specimen: Blood from Peripheral, Antecubital, Right Updated: 10/04/22 0612     Blood Culture, Routine No growth to date      No Growth to date      No Growth to date      No Growth to date    Culture, Anaerobe [[id number]] Collected: 09/27/22 1138    Order Status: Completed Specimen: Abscess from Heart Updated: 10/03/22 1051     Anaerobic Culture No anaerobes isolated    Narrative:      RV Lead Tip    Culture, Respiratory with Gram Stain [[id number]] Collected: 10/01/22 1041    Order Status: Completed Specimen: Respiratory from Endotracheal Aspirate Updated: 10/03/22 1043     Respiratory Culture Normal respiratory jadiel      No S aureus or Pseudomonas isolated.     Gram Stain (Respiratory) <10 epithelial cells per low power field.      Gram Stain (Respiratory) Rare WBC's     Gram Stain (Respiratory) No organisms seen    Aerobic culture [[id number]] Collected: 09/27/22 1133    Order Status: Completed Specimen: Abscess from Heart Updated: 09/30/22 1153     Aerobic Bacterial Culture No growth    Narrative:      RA Lead Tip    Aerobic culture [[id number]] Collected: 09/27/22 0952    Order Status: Completed Specimen: Abscess from Chest, Left Updated: 09/30/22 1153     Aerobic Bacterial Culture No growth    Narrative:      Deep Pocket #2    Aerobic culture [[id number]] Collected: 09/27/22 1032    Order Status: Completed Specimen: Abscess from Heart Updated: 09/30/22 1153     Aerobic Bacterial Culture No growth    Narrative:      RA Lead drainage #1    Aerobic culture [[id number]] Collected: 09/27/22 0947    Order Status: Completed Specimen: Abscess from Chest, Left Updated: 09/30/22 1153     Aerobic Bacterial Culture No growth    Narrative:      Deep Pocket #1    Aerobic culture [[id number]] Collected: 09/27/22 1026    Order Status: Completed Specimen: Abscess from Heart Updated: 09/30/22 1153     Aerobic Bacterial Culture No growth    Narrative:      LV Lead Tip    Aerobic culture [[id number]] Collected: 09/27/22 1035    Order Status: Completed Specimen: Abscess from Heart Updated: 09/30/22 1153     Aerobic Bacterial Culture No growth    Narrative:      RA Lead drainage #2    Aerobic culture [[id number]] Collected: 09/27/22 1138    Order Status: Completed Specimen: Abscess from Heart Updated: 09/30/22 1153     Aerobic Bacterial Culture No growth    Narrative:      RV Lead Tip    Aerobic culture [[id number]] Collected: 09/27/22 0936    Order Status: Completed Specimen: Abscess from Chest, Left Updated: 09/30/22 1153     Aerobic Bacterial Culture No growth    Narrative:      Shallow pocket    Culture, Anaerobe [[id number]]     Order Status: No result Specimen: Abscess from Heart     Aerobic culture [[id number]]     Order Status: No result Specimen: Abscess from  Heart             BMP:   Recent Labs   Lab 10/04/22  0354   *      K 3.9      CO2 23   BUN 28*   CREATININE 1.7*   CALCIUM 9.2   MG 2.3       I have reviewed all pertinent labs within the past 24 hours.    Estimated Creatinine Clearance: 42.9 mL/min (A) (based on SCr of 1.7 mg/dL (H)).    Diagnostic Results:  Reviewed

## 2022-10-04 NOTE — PROGRESS NOTES
Spoke with Dr Anshumen Desai, ordered patient a diet and instructed to resume heparin gtt at previous rate.     Spoke with Benton walker MD and stated not to start the heparin gtt until 8 hours after the procedure.  Heparin will be held until 8pm as patient returned to floor at 1200

## 2022-10-04 NOTE — PROGRESS NOTES
[facility] - Cardiac Int[facility]  Heart Transplant  Progress Note    Patient Name: Audrey Oneil Jr.  MRN: [id number]  Admission Date: 9/14/2022  Hospital Length of Stay: 20 days  Attending Physician: Edison Marshall MD  Primary Care Provider: Primary Doctor No  Principal Problem:Acute on chronic combined systolic and diastolic heart failure    Subjective:     Interval History: Pt extubated on 10/1/22 and doing well at this time on 2l NC satting at 96%. On Dobutmine 2.5( baseline) and off pressors.   Plan for EUS with biopsy of Pancreatic lesion today.   Continuous Infusions:   sodium chloride 0.9% Stopped (09/28/22 1550)    sodium chloride 0.9% 5 mL/hr at 10/03/22 0445    DOBUTamine IV infusion (non-titrating) 2.5 mcg/kg/min (10/04/22 0901)    EPINEPHrine Stopped (09/30/22 0207)    heparin (porcine) in D5W Stopped (10/04/22 0312)     Scheduled Meds:   acetaminophen  650 mg Oral QID    allopurinoL  200 mg Oral Daily    amiodarone  300 mg Oral Daily    aspirin  81 mg Oral Daily    atorvastatin  80 mg Oral Daily    DAPTOmycin (CUBICIN) IV (PEDS and ADULTS)  10 mg/kg Intravenous Q24H    famotidine (PF)  20 mg Intravenous Daily    LIDOcaine  1 patch Transdermal Q24H    piperacillin-tazobactam (ZOSYN) IVPB  4.5 g Intravenous Q8H    polyethylene glycol  17 g Oral Daily    potassium chloride  40 mEq Oral Once    predniSONE  30 mg Oral Daily     PRN Meds:sodium chloride, acetaminophen, dextromethorphan-guaiFENesin  mg, heparin (PORCINE), heparin (PORCINE), HYDROcodone-acetaminophen, melatonin, methocarbamoL, ondansetron, senna-docusate 8.6-50 mg, sodium chloride 0.9%    Review of patient's allergies indicates:   Allergen Reactions    Iodine containing multivitamin Swelling     itching    Keflex [cephalexin] Swelling     Eyes.  Tolerated multiple doses of zosyn and 1 dose of augmentin in 2015 and 2016, respectively    Peaches [peach (prunus persica)] Swelling     eyes    Shellfish containing products  Swelling    Fig tree Swelling     itching    Tuberculin spenser test ppd Rash     Objective:     Vital Signs (Most Recent):  Temp: 98.1 °F (36.7 °C) (10/04/22 0300)  Pulse: 85 (10/04/22 0700)  Resp: (!) 34 (10/04/22 0700)  BP: (!) 136/57 (10/03/22 0305)  SpO2: (!) 93 % (10/04/22 0831)   Vital Signs (24h Range):  Temp:  [97.5 °F (36.4 °C)-98.4 °F (36.9 °C)] 98.1 °F (36.7 °C)  Pulse:  [83-99] 85  Resp:  [15-36] 34  SpO2:  [93 %-100 %] 93 %  Arterial Line BP: (119-151)/(53-71) 130/60     Patient Vitals for the past 72 hrs (Last 3 readings):   Weight   10/04/22 0500 88.5 kg (195 lb 1.7 oz)   10/02/22 0600 93 kg (205 lb 0.4 oz)       Body mass index is 30.56 kg/m².      Intake/Output Summary (Last 24 hours) at 10/4/2022 1107  Last data filed at 10/4/2022 0946  Gross per 24 hour   Intake 1330.29 ml   Output 920 ml   Net 410.29 ml           Physical Exam  Constitutional:       General: He is not in acute distress.     Appearance: Normal appearance. He is normal weight. He is not ill-appearing or toxic-appearing.   HENT:      Head: Normocephalic and atraumatic.      Nose: Nose normal. No congestion.      Mouth/Throat:      Mouth: Mucous membranes are moist.      Pharynx: No oropharyngeal exudate.   Eyes:      General:         Right eye: No discharge.         Left eye: No discharge.      Extraocular Movements: Extraocular movements intact.      Pupils: Pupils are equal, round, and reactive to light.   Cardiovascular:      Rate and Rhythm: Normal rate and regular rhythm.      Heart sounds: No murmur heard.    No friction rub. No gallop.   Pulmonary:      Effort: Pulmonary effort is normal. No respiratory distress.      Breath sounds: Normal breath sounds. No wheezing, rhonchi or rales.   Abdominal:      General: Abdomen is flat. Bowel sounds are normal. There is no distension.      Palpations: Abdomen is soft.      Tenderness: There is no abdominal tenderness.   Musculoskeletal:         General: No swelling. Normal range of  motion.      Cervical back: Normal range of motion. No rigidity.      Right lower leg: No edema.      Left lower leg: No edema.   Skin:     General: Skin is warm and dry.      Findings: No lesion or rash.   Neurological:      General: No focal deficit present.      Mental Status: He is alert and oriented to person, place, and time.      Cranial Nerves: No cranial nerve deficit.      Motor: No weakness.       Significant Labs:  CBC:  Recent Labs   Lab 10/02/22  0351 10/03/22  0217 10/04/22  0354   WBC 6.88 6.66 5.60   RBC 3.17* 3.32* 3.19*   HGB 8.9* 9.2* 8.8*   HCT 26.8* 29.1* 27.7*    312 270   MCV 85 88 87   MCH 28.1 27.7 27.6   MCHC 33.2 31.6* 31.8*       BNP:  No results for input(s): BNP in the last 168 hours.    Invalid input(s): BNPTRIAGELBLO  CMP:  Recent Labs   Lab 10/01/22  1139 10/01/22  2246 10/02/22  0351 10/03/22  0506 10/04/22  0354    141* 105 93 122*   CALCIUM 8.8 8.9 8.5* 9.1 9.2   ALBUMIN 3.0* 3.1* 2.9*  --   --    PROT 6.5 6.7 6.5  --   --    * 135* 135* 136 136   K 3.2* 3.8 3.7 3.6 3.9   CO2 19* 21* 22* 24 23   CL 98 99 98 99 102   BUN 33* 31* 29* 27* 28*   CREATININE 2.0* 2.1* 2.0* 2.2* 1.7*   ALKPHOS 80 81 83  --   --    ALT 13 13 13  --   --    AST 13 15 15  --   --    BILITOT 0.9 0.8 0.8  --   --         Coagulation:   Recent Labs   Lab 10/01/22  1547 10/01/22  2246 10/03/22  0217 10/03/22  0837 10/04/22  0354   INR 1.0  --   --   --   --    APTT 28.7   < > 50.7* 45.8* 41.9*  41.9*    < > = values in this interval not displayed.       LDH:  No results for input(s): LDH in the last 72 hours.  Microbiology:  Microbiology Results (last 7 days)       Procedure Component Value Units Date/Time    Culture, Anaerobe [[id number]] Collected: 09/27/22 1026    Order Status: Completed Specimen: Abscess from Heart Updated: 10/04/22 0933     Anaerobic Culture No anaerobes isolated    Narrative:      LV Lead Tip    Culture, Anaerobe [[id number]] Collected: 09/27/22 1133    Order Status:  Completed Specimen: Abscess from Heart Updated: 10/04/22 0933     Anaerobic Culture No anaerobes isolated    Narrative:      RA Lead Tip    Culture, Anaerobe [[id number]] Collected: 09/27/22 1032    Order Status: Completed Specimen: Abscess from Heart Updated: 10/04/22 0932     Anaerobic Culture No anaerobes isolated    Narrative:      RA Lead drainage #1    Culture, Anaerobe [[id number]] Collected: 09/27/22 1035    Order Status: Completed Specimen: Abscess from Heart Updated: 10/04/22 0932     Anaerobic Culture No anaerobes isolated    Narrative:      RA Lead drainage #2    Culture, Anaerobe [[id number]] Collected: 09/27/22 0947    Order Status: Completed Specimen: Abscess from Chest, Left Updated: 10/04/22 0932     Anaerobic Culture No anaerobes isolated    Narrative:      Deep Pocket #1    Culture, Anaerobe [[id number]] Collected: 09/27/22 0952    Order Status: Completed Specimen: Abscess from Chest, Left Updated: 10/04/22 0932     Anaerobic Culture No anaerobes isolated    Narrative:      Deep Pocket #2    Culture, Anaerobe [[id number]] Collected: 09/27/22 0936    Order Status: Completed Specimen: Abscess from Chest, Left Updated: 10/04/22 0932     Anaerobic Culture No anaerobes isolated    Narrative:      Shallow Pocket    Blood culture [[id number]] Collected: 09/30/22 0241    Order Status: Completed Specimen: Blood from Peripheral, Hand, Right Updated: 10/04/22 0612     Blood Culture, Routine No growth to date      No Growth to date      No Growth to date      No Growth to date    Blood culture [[id number]] Collected: 09/30/22 0243    Order Status: Completed Specimen: Blood from Peripheral, Antecubital, Right Updated: 10/04/22 0612     Blood Culture, Routine No growth to date      No Growth to date      No Growth to date      No Growth to date    Culture, Anaerobe [[id number]] Collected: 09/27/22 1138    Order Status: Completed Specimen: Abscess from Heart Updated: 10/03/22 1051     Anaerobic Culture No anaerobes  isolated    Narrative:      RV Lead Tip    Culture, Respiratory with Gram Stain [[id number]] Collected: 10/01/22 1041    Order Status: Completed Specimen: Respiratory from Endotracheal Aspirate Updated: 10/03/22 1043     Respiratory Culture Normal respiratory jadiel      No S aureus or Pseudomonas isolated.     Gram Stain (Respiratory) <10 epithelial cells per low power field.     Gram Stain (Respiratory) Rare WBC's     Gram Stain (Respiratory) No organisms seen    Aerobic culture [[id number]] Collected: 09/27/22 1133    Order Status: Completed Specimen: Abscess from Heart Updated: 09/30/22 1153     Aerobic Bacterial Culture No growth    Narrative:      RA Lead Tip    Aerobic culture [[id number]] Collected: 09/27/22 0952    Order Status: Completed Specimen: Abscess from Chest, Left Updated: 09/30/22 1153     Aerobic Bacterial Culture No growth    Narrative:      Deep Pocket #2    Aerobic culture [[id number]] Collected: 09/27/22 1032    Order Status: Completed Specimen: Abscess from Heart Updated: 09/30/22 1153     Aerobic Bacterial Culture No growth    Narrative:      RA Lead drainage #1    Aerobic culture [[id number]] Collected: 09/27/22 0947    Order Status: Completed Specimen: Abscess from Chest, Left Updated: 09/30/22 1153     Aerobic Bacterial Culture No growth    Narrative:      Deep Pocket #1    Aerobic culture [[id number]] Collected: 09/27/22 1026    Order Status: Completed Specimen: Abscess from Heart Updated: 09/30/22 1153     Aerobic Bacterial Culture No growth    Narrative:      LV Lead Tip    Aerobic culture [[id number]] Collected: 09/27/22 1035    Order Status: Completed Specimen: Abscess from Heart Updated: 09/30/22 1153     Aerobic Bacterial Culture No growth    Narrative:      RA Lead drainage #2    Aerobic culture [[id number]] Collected: 09/27/22 1138    Order Status: Completed Specimen: Abscess from Heart Updated: 09/30/22 1153     Aerobic Bacterial Culture No growth    Narrative:      RV Lead Tip     Aerobic culture [[id number]] Collected: 09/27/22 0936    Order Status: Completed Specimen: Abscess from Chest, Left Updated: 09/30/22 1153     Aerobic Bacterial Culture No growth    Narrative:      Shallow pocket    Culture, Anaerobe [[id number]]     Order Status: No result Specimen: Abscess from Heart     Aerobic culture [[id number]]     Order Status: No result Specimen: Abscess from Heart             BMP:   Recent Labs   Lab 10/04/22  0354   *      K 3.9      CO2 23   BUN 28*   CREATININE 1.7*   CALCIUM 9.2   MG 2.3       I have reviewed all pertinent labs within the past 24 hours.    Estimated Creatinine Clearance: 42.9 mL/min (A) (based on SCr of 1.7 mg/dL (H)).    Diagnostic Results:  Reviewed     Assessment and Plan:   71 yo WM being worked up for LVAD since hospitalization January 2022 for recurrent VT (he thinks had MI) and cardiogenic shock at [facility]. He was  later seen in [facility] where he was treated for cardiogenic shock and sent home on .  He remains on  and is pursuing evaluation for LVAD.     His case was discussed at selection committee and he was deferred pending evaluation of pancreatic mass.  They wish to proceed with MRI but not sure with his ICD if this will be possible.Presented with MRSA bacteremia on admission.   HARRY 9/21/22: Not concerning for Vegetations. BCx negative 9/18.      9/27 CRT generator and lead extraction, pocket cx => hypotensive post-procedure requiring Epi, ICU  9/28 off Epi, transfer to CSU  9/29 sudden hypoxemic respiratory failure requiring urgent intubation after sats dropped while laying flat for PICC line => tx ICU, panculture       Acute Hypoxic respiratory failure 9/29/22 -resolved  -suspect aspiration event vs PE. Pt currently on ABX   -intubated and sedated 9/29, extubated 10/1/22  -Saturating well on NC 2l     * Chronic combined systolic and diastolic congestive heart failure  - On Dobutamine 2.5 which is his baseline and off pressors since 10/2   ,MAPS >60.   - off diuretics currently. Cr improved to 1.7( baseline 1.4-1.7)  CVP 7 this am   -CO 4.2, CI 2, SVR 1200.  - Daily weights, Strict I/Os  - Monitor electrolytes and Maintain Mg >2 and K >4  -Telemetry monitoring     Pancreatic lesion  - Gastroenterology consult and recommendations appreciated  - EUS with biopsy  today, will f/u results. Will restart heparin based on recommendations from endoscopy team.     Bacteremia due to methicillin resistant Staphylococcus aureus  - Monitor WBC count and fever curve, pan-culture if patient spikes  - ID consult and recommendations are appreciated  - On Dapto currently end date 11/11 per ID ( 6 wks after device removal 9/27) for the MRSA bacteremia 9/14, 9/15, 9/16 positive for MRSA. 9/18 NG  - on Zosyn started 9/30/22  for concern for aspiration recently. Will require 5 days, end date 10/4/2022  -9/30 BCX negative , resp cultures 10/1 NG     H/O ventricular tachycardia  - Amiodarone 300 mg, daily  - Monitor LFTs.      Coronary artery disease involving native coronary artery of native heart without angina pectoris  - Asprin 81 mg, daily  - Atorvastatin 80 mg, nightl     Left Brachial Vein Thrombosis 9/21  - Heparin on hold for biopsy today. Will restart after talking to endoscopy team.         Acute on Chronic Gout  - pred 30 mg daily for 5 days, currently day 2. Expect elevation in wbc.   -On allopurinol 200 mg          Benotn Rendon MD  Heart Transplant  [facility] - Card[facility]

## 2022-10-04 NOTE — PLAN OF CARE
No acute events throughout day, VS and assessment per flow sheet, see below for updates:    Pulmonary: Room air     Cardiovascular: SR     Neurological: aaoX3 afebrile pulses intact    Gastrointestinal: last bm 10/2 cardiac/ 1500 FR diet     Genitourinary: urinal    Endocrine: WNL    Skin/Bath: See doc flow    Date of last CHG bath given: 10/4 0400    Infusions: dobutamine, heparin to be restarted at 8pm secondary to pancreatic biopsy    Patient progressing towards goals as tolerated, plan of care communicated and reviewed with Audrey Oneil Jr. and family, all concerns addressed, will continue to monitor.     KIRBY NAZARIO, RN     CICU DAILY GOALS       A: Awake    RASS: Goal - RASS Goal: 0-->alert and calm  Actual - RASS (Kilpatrick Agitation-Sedation Scale): 0-->alert and calm   Restraint necessity: Clinical Justification: Treatment Interference, Removing medical devices  B: Breath   SBT: Not intubated   C: Coordinate A & B, analgesics/sedatives   Pain: managed    SAT: Not intubated  D: Delirium   CAM-ICU: Overall CAM-ICU: Negative  E: Early(intubated/ Progressive (non-intubated) Mobility   MOVE Screen: Pass   Activity: Activity Management: Rolling - L1, Heel slide - L1, Arm raise - L1, Ankle pumps - L1  FAS: Feeding/Nutrition   Diet order: Diet/Nutrition Received: 2 gram sodium,   Fluid restriction: Fluid Requirement: 1500 fr   Nutritional Supplement Intake: Quantity 3, Type:  boost  T: Thrombus   DVT prophylaxis: VTE Required Core Measure: Pharmacological prophylaxis initiated/maintained  H: HOB Elevation   Head of Bed (HOB) Positioning: HOB at 20-30 degrees  U: Ulcer Prophylaxis   GI: yes  G: Glucose control   na  Glycemic Management: blood glucose monitored  S: Skin   Bundle compliance: yes   Bathing/Skin Care: back care, bath, complete, dressed/undressed, incontinence care, linen changed Date: 10/4  B: Bowel Function   no issues   I: Indwelling Catheters   Seay necessity: [REMOVED]      Urethral  Catheter 09/27/22 0755 Double-lumen 16 Fr.-Reason for Continuing Urinary Catheterization: Post operative, Critically ill in ICU and requiring hourly monitoring of intake/output  [REMOVED]      Urethral Catheter 09/29/22 1600-Reason for Continuing Urinary Catheterization: Urinary retention, Critically ill in ICU and requiring hourly monitoring of intake/output   CVC necessity: Yes   IPAD offered: Not appropriate  D: De-escalation Antibx   No  Plan for the day   Continue to monitor  Family/Goals of care/Code Status   Code Status: Full Code     No acute events throughout day, VS and assessment per flow sheet, patient progressing towards goals as tolerated, plan of care reviewed with Audrey Oneil Jr. and family, all concerns addressed, will continue to monitor.

## 2022-10-04 NOTE — CONSULTS
"[facility] - Cardiac Intensive Care  Adult Nutrition  Consult Note    SUMMARY     Recommendations  1. Continue Vegetarian diet- fluid per MD (encourage adequate intake)  2. Continue Boost Plus TID for optimzation of protein and calorie intake   3. RD following    Goals: Meet % of EEN/EPN by RD f/u date  Nutrition Goal Status: progressing towards goal  Communication of RD Recs: other (POC)    Assessment and Plan    Nutrition Problem  Increased protein/energy needs     Related to (etiology):   Physiological needs    Signs and Symptoms (as evidenced by):   ~HF     Interventions(treatment strategy):  Collaboration of nutrition care w/ other providers     Nutrition Diagnosis Status:   New       Reason for Assessment    Reason For Assessment: consult  Diagnosis: cardiac disease  Relevant Medical History: HTN, CHF, gout  Interdisciplinary Rounds: did not attend  General Information Comments: Spoke w/ pt at bedside, reports a good appetite currently. Per RN documentation, pt consuming between 25-50% meal consumption. Pt dislikes meats (pt preferences noted- diet changed to vegetarian diet) Pt denies any issues w/ chewing/swallowing at this time. No nv/d/c. Reports UBW of 220 lbs. Pt does not prefer Boost GC only likes Boost Plus-reports drinking ONS provided. RD encouraged adequate PO + ONS intake during RD visit. Weight fluctuations noted in chart review, likely d/t fluid. Visual NFPE completed, pt does not meet criteria for malnutrition at this time however is at risk if po intake declines. LBM noted-10/2/22  Nutrition Discharge Planning: cardiac diet    Nutrition Risk Screen    Nutrition Risk Screen: dysphagia or difficulty swallowing    Nutrition/Diet History    Spiritual, Cultural Beliefs, Rastafarian Practices, Values that Affect Care: no    Anthropometrics    Temp: 98 °F (36.7 °C)  Height Method: Stated  Height: 5' 7" (170.2 cm)  Height (inches): 67 in  Weight Method: Bed Scale  Weight: 88.5 kg (195 lb 1.7 " oz)  Weight (lb): 195.11 lb  Ideal Body Weight (IBW), Male: 148 lb  % Ideal Body Weight, Male (lb): 140.54 %  BMI (Calculated): 30.6       Lab/Procedures/Meds    Pertinent Labs Reviewed: reviewed  Pertinent Labs Comments: BUN 28, Cr 1.7, GFR 42.8, Glucose 122  Pertinent Medications Reviewed: reviewed  Pertinent Medications Comments: atorvastatin      Estimated/Assessed Needs    Weight Used For Calorie Calculations: 88.5 kg (195 lb 1.7 oz)  Energy Calorie Requirements (kcal): 1923  Energy Need Method: Remlap-St Jeor (PAL 1.2)  Protein Requirements: 133 g (1.5 g/kg)  Weight Used For Protein Calculations: 88.5 kg (195 lb 1.7 oz)   RDA Method (mL): 1923         Evaluation of Received Nutrient/Fluid Intake    I/O: -2.2 L since 9/20/22  Energy Calories Required: meeting needs  Protein Required: meeting needs  Fluid Required: meeting needs  Total Fluid Intake (mL/kg): 1 ml or fluid per MD  Tolerance: tolerating  % Intake of Estimated Energy Needs: 25 - 50 %  % Meal Intake: 25 - 50 %    Nutrition Risk    Level of Risk/Frequency of Follow-up: low ((1 x/week))       Monitor and Evaluation    Food and Nutrient Intake: food and beverage intake, energy intake  Food and Nutrient Adminstration: diet order  Knowledge/Beliefs/Attitudes: food and nutrition knowledge/skill, beliefs and attitudes  Physical Activity and Function: nutrition-related ADLs and IADLs, factors affecting access to physical activity  Anthropometric Measurements: height/length, weight, weight change, body mass index, growth pattern indices/percentile ranks  Biochemical Data, Medical Tests and Procedures: electrolyte and renal panel, gastrointestinal profile, glucose/endocrine profile, inflammatory profile, lipid profile  Nutrition-Focused Physical Findings: overall appearance, extremities, muscles and bones, head and eyes, skin       Nutrition Follow-Up    RD Follow-up?: Yes

## 2022-10-04 NOTE — PLAN OF CARE
Recommendations  1. Continue Vegetarian diet- fluid per MD (encourage adequate intake)  2. Continue Boost Plus TID for optimzation of protein and calorie intake   3. RD following     Goals: Meet % of EEN/EPN by RD f/u date  Nutrition Goal Status: progressing towards goal  Communication of RD Recs: other (POC)

## 2022-10-04 NOTE — PLAN OF CARE
CICU DAILY GOALS       A: Awake    RASS: Goal - RASS Goal: 0-->alert and calm  Actual - RASS (Kilpatrick Agitation-Sedation Scale): 0-->alert and calm   Restraint necessity: Clinical Justification: Treatment Interference, Removing medical devices  B: Breath   SBT: Not intubated   C: Coordinate A & B, analgesics/sedatives   Pain: managed    SAT: Not intubated  D: Delirium   CAM-ICU: Overall CAM-ICU: Negative  E: Early(intubated/ Progressive (non-intubated) Mobility   MOVE Screen: Pass   Activity: Activity Management: Ankle pumps - L1, Arm raise - L1  FAS: Feeding/Nutrition   Diet order: Diet/Nutrition Received: 2 gram sodium, low saturated fat/low cholesterol,   Fluid restriction: Fluid Requirement: 1500cc FR  T: Thrombus   DVT prophylaxis: VTE Required Core Measure: Pharmacological prophylaxis initiated/maintained  H: HOB Elevation   Head of Bed (HOB) Positioning: HOB at 20-30 degrees  U: Ulcer Prophylaxis   GI: no  G: Glucose control   managed Glycemic Management: blood glucose monitored  S: Skin   Bundle compliance: yes   Bathing/Skin Care: back care, bath, complete, dressed/undressed, incontinence care, linen changed Date: PM Bath  B: Bowel Function   no issues   I: Indwelling Catheters   Seay necessity: [REMOVED]      Urethral Catheter 09/27/22 0755 Double-lumen 16 Fr.-Reason for Continuing Urinary Catheterization: Post operative, Critically ill in ICU and requiring hourly monitoring of intake/output  [REMOVED]      Urethral Catheter 09/29/22 1600-Reason for Continuing Urinary Catheterization: Urinary retention, Critically ill in ICU and requiring hourly monitoring of intake/output   CVC necessity: Yes   IPAD offered: No  D: De-escalation Antibx   Yes  Plan for the day   Monitor VS and UOP. CVP 5, 5, &4. SV02 51. NPO since midnight. Heparin off since 3AM for biopsy.     Family/Goals of care/Code Status   Code Status: Full Code     No acute events throughout day, VS and assessment per flow sheet, patient  progressing towards goals as tolerated, plan of care reviewed with Audrey Oneil Jr. and family, all concerns addressed, will continue to monitor

## 2022-10-04 NOTE — PLAN OF CARE
Hemodynamics:   Parameter  10/4/22 at 500   CVP 4   SvO2 51    cc in the last 4 hr   CI  2.0   CO 4.2   SVR 1485   MAP 82   Scr/BUN 1.7/28        UOP:  UOP: cc/hr over past 2-4 hours    Intake/Output Summary (Last 24 hours) at 10/4/2022 0523  Last data filed at 10/4/2022 0500  Gross per 24 hour   Intake 1235.71 ml   Output 850 ml   Net 385.71 ml       Current Heart Failure Medications:  - Dobutamine 2.5 mcg/kg/min      Assessment/Plan:  - No change in plan  - Plan was discussed with attending staff   - Repeat Hemodynamics at 7 am        Ramu Hendricks MD    Cardiology Fellow PGY IV [facility]

## 2022-10-04 NOTE — ANESTHESIA POSTPROCEDURE EVALUATION
Anesthesia Post Evaluation    Patient: Audrey Oneil JrFly    Procedure(s) Performed: Procedure(s) (LRB):  ULTRASOUND, UPPER GI TRACT, ENDOSCOPIC (N/A)    Final Anesthesia Type: general      Patient location during evaluation: PACU  Patient participation: Yes- Able to Participate  Level of consciousness: awake and alert  Post-procedure vital signs: reviewed and stable  Pain management: adequate  Airway patency: patent    PONV status at discharge: No PONV  Anesthetic complications: no      Cardiovascular status: blood pressure returned to baseline  Respiratory status: unassisted, room air and spontaneous ventilation  Hydration status: euvolemic  Follow-up not needed.          Vitals Value Taken Time   BP ** 10/04/22 1242   Temp ** 10/04/22 1242   Pulse 88 10/04/22 1242   Resp 35 10/04/22 1242   SpO2 92 % 10/04/22 1242   Vitals shown include unvalidated device data.      No case tracking events are documented in the log.      Pain/Magaly Score: Pain Rating Prior to Med Admin: 5 (10/3/2022 12:05 PM)

## 2022-10-04 NOTE — TRANSFER OF CARE
"Anesthesia Transfer of Care Note    Patient: Audrey Oneil Jr.    Procedure(s) Performed: Procedure(s) (LRB):  ULTRASOUND, UPPER GI TRACT, ENDOSCOPIC (N/A)    Patient location: ICU    Anesthesia Type: general    Transport from OR: Transported from OR on room air with adequate spontaneous ventilation    Post pain: adequate analgesia    Post assessment: no apparent anesthetic complications    Post vital signs: stable    Level of consciousness: awake    Nausea/Vomiting: no nausea/vomiting    Complications: none    Transfer of care protocol was followed      Last vitals:   Visit Vitals  /62   Pulse 85   Temp 36.7 °C (98.1 °F) (Oral)   Resp (!) 34   Ht 5' 7" (1.702 m)   Wt 88.5 kg (195 lb 1.7 oz)   SpO2 (!) 93%   BMI 30.56 kg/m²     "

## 2022-10-04 NOTE — PLAN OF CARE
HTS - Plan of Care     Hemodynamics:   Parameter  10/3/22  at 2200   CVP 5   SvO2 61    in last 4 h    CI  3.7   CO 7.9      MAP 79   Scr/BUN 2.2/27     Current Heart Failure Medications:  - Dobutamine 2.5 mcg/kg/min    Assessment/Plan:  - No change in plan  - Plan was discussed with attending staff     Ramu Macedo   PGY 4 Cardiology fellow [facility] [place]

## 2022-10-04 NOTE — PROGRESS NOTES
Interdisciplinary Rounds Report:   Attended interdisciplinary rounds with the [facility]/CTS services including the LVAD Coordinators, social workers, cardiologists, surgeons,  PT/OT/Speech, dietician, and unit charge nurses. Discussed patient status, plan of care, goals of care, including DC date, and post discharge needs. Plan of care will be discussed with the patient and/or family per the physician while rounding on the floor. This is a recurring meeting that is medically and socially necessary to collaborate with the interdisciplinary team to assist patient needs and safe discharge.

## 2022-10-04 NOTE — H&P
Short Stay Endoscopy History and Physical    PCP - Primary Doctor No  Referring Physician - Aaareferral Self  No address on file    Procedure - EUS  ASA - per anesthesia  Mallampati - per anesthesia  History of Anesthesia problems - no  Family history Anesthesia problems -  no   Plan of anesthesia - General    HPI:  This is a 72 y.o. male here for evaluation of: pancreatic cyst    Reflux - no  Dysphagia - no  Abdominal pain - no  Diarrhea - no    ROS:  Constitutional: No fevers, chills, No weight loss  CV: No chest pain  Pulm: No cough, No shortness of breath  GI: see HPI    Medical History:  has a past medical history of Acute hypoxemic respiratory failure (11/7/2015), Acute idiopathic gout of left knee (12/2/2019), Acute idiopathic gout of right elbow (9/23/2021), AICD (automatic cardioverter/defibrillator) present (12/13/2015), Anticoagulant long-term use, Bronchopneumonia (12/20/2021), CHF (congestive heart failure), Chronic anticoagulation (5/5/2016), Chronic combined systolic and diastolic heart failure (11/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), Chronic gout (12/2/2019), Clotting disorder, Coronary artery disease involving native coronary artery of native heart without angina pectoris (11/26/2012), COVID-19 (08/2021), Diverticulosis of colon, DVT (deep venous thrombosis), unspecified laterality (11/12/2015), Dysphonia (1/28/2018), Essential hypertension (11/15/2015), Fine motor impairment (2/2/2021), Hyperlipidemia, Hypertensive heart disease with heart failure (5/5/2016), MI (myocardial infarction) (2009), Nicotine abuse, Obesity (11/26/2012), Olecranon bursitis of right elbow (9/19/2021), Pulmonary embolism (2011), Renal disorder, Right carpal tunnel syndrome (4/6/2018), Stented coronary artery (11/26/2012), and Trigger thumb of right hand (4/6/2018).    Surgical History:  has a past surgical history that includes Back surgery; Appendectomy; Tonsillectomy; r knee scope; Spine surgery; Cardiac surgery (2007); Cardiac defibrillator  placement; Carpal tunnel release (Right, 04/2018); Trigger finger release (Right, 04/2018); Insertion of biventricular implantable cardioverter-defibrillator (ICD) (N/A, 5/3/2019); Revision of skin pocket for cardioverter-defibrillator (Left, 5/3/2019); Right heart catheterization (Right, 6/14/2021); Right heart catheterization (Right, 6/17/2022); and Colonoscopy (N/A, 8/8/2022).    Family History: family history includes Cancer in his mother and paternal grandmother; Colon polyps in his father; Diabetes in his mother; Heart disease in his father and mother; No Known Problems in his daughter, sister, sister, son, and son; Stroke in his father..    Social History:  reports that he quit smoking about 16 years ago. His smoking use included cigarettes. He has a 45.00 pack-year smoking history. He has never used smokeless tobacco. He reports that he does not drink alcohol and does not use drugs.    Review of patient's allergies indicates:   Allergen Reactions    Iodine containing multivitamin Swelling     itching    Keflex [cephalexin] Swelling     Eyes.  Tolerated multiple doses of zosyn and 1 dose of augmentin in 2015 and 2016, respectively    Peaches [peach (prunus persica)] Swelling     eyes    Shellfish containing products Swelling    Fig tree Swelling     itching    Tuberculin spenser test ppd Rash       Medications:   Medications Prior to Admission   Medication Sig Dispense Refill Last Dose    allopurinoL (ZYLOPRIM) 100 MG tablet Take 2 tablets (200 mg total) by mouth once daily. 60 tablet 0     amiodarone (PACERONE) 200 MG Tab TAKE 1 AND 1/2 TABLETS(300 MG) BY MOUTH EVERY  tablet 3     aspirin (ECOTRIN) 81 MG EC tablet Take 1 tablet (81 mg total) by mouth once daily. 90 tablet 3     atorvastatin (LIPITOR) 80 MG tablet Take 1 tablet (80 mg total) by mouth once daily. 90 tablet 1     DOBUTamine (DOBUTREX) 500 mg/250 mL (2,000 mcg/mL) 2.5 mcg/kg/min  Patient is 95.7 kg 250 mL 5     furosemide (LASIX) 40 MG  tablet Take 1 tablet (40 mg total) by mouth daily as needed (Weight gain of 3 lbs in one day or 5 lbs in one week). 30 tablet 11     HYDROcodone-acetaminophen (NORCO)  mg per tablet Take 1 tablet by mouth every 6 (six) hours.       JARDIANCE 25 mg tablet Take 1 tablet (25 mg total) by mouth once daily. 30 tablet 5     ondansetron (ZOFRAN-ODT) 4 MG TbDL Dissolve 1 tablet (4 mg total) by mouth every 6 (six) hours as needed (nausea). 30 tablet 1     polyethylene glycol (GOLYTELY) 236-22.74-6.74 -5.86 gram suspension SMARTSIG:Milliliter(s) By Mouth       sacubitriL-valsartan (ENTRESTO) 49-51 mg per tablet Take 1 tablet by mouth 2 (two) times daily. 60 tablet 3     simethicone (MYLICON) 80 MG chewable tablet Take 1 tablet (80 mg total) by mouth every 6 (six) hours as needed for Flatulence. 60 tablet 1     spironolactone (ALDACTONE) 25 MG tablet Take 1 tablet (25 mg total) by mouth once daily. 30 tablet 3        Physical Exam:    Vital Signs:   Vitals:    10/04/22 0700   BP:    Pulse: 85   Resp: (!) 34   Temp:        General Appearance: Well appearing in no acute distress  Eyes:    No scleral icterus  Lungs: no labored breathing  Heart:  Regular  Abdomen: non tender    Labs:  Lab Results   Component Value Date    WBC 5.60 10/04/2022    HGB 8.8 (L) 10/04/2022    HCT 27.7 (L) 10/04/2022     10/04/2022    CHOL 145 05/18/2022    TRIG 186 (H) 05/18/2022    HDL 50 05/18/2022    ALT 13 10/02/2022    AST 15 10/02/2022     10/04/2022    K 3.9 10/04/2022     10/04/2022    CREATININE 1.7 (H) 10/04/2022    BUN 28 (H) 10/04/2022    CO2 23 10/04/2022    TSH 2.834 06/22/2022    PSA 2.9 05/18/2022    INR 1.0 10/01/2022    HGBA1C 5.6 05/28/2022       I have explained the risks and benefits of this endoscopic procedure to the patient including but not limited to bleeding, inflammation, infection, perforation, and death.      Charlie Smith MD

## 2022-10-04 NOTE — PROVATION PATIENT INSTRUCTIONS
Discharge Summary/Instructions after an Endoscopic Procedure  Patient Name: Audrey Oneil  Patient MRN: [id number]  Patient YOB: 1950 Tuesday, October 4, 2022  Charlie Smith MD  Dear patient,  As a result of recent federal legislation (The Federal Cures Act), you may   receive lab or pathology results from your procedure in your [facility]   account before your physician is able to contact you. Your physician or   their representative will relay the results to you with their   recommendations at their soonest availability.  Thank you,  RESTRICTIONS:  During your procedure today, you received medications for sedation.  These   medications may affect your judgment, balance and coordination.  Therefore,   for 24 hours, you have the following restrictions:   - DO NOT drive a car, operate machinery, make legal/financial decisions,   sign important papers or drink alcohol.    ACTIVITY:  Today: no heavy lifting, straining or running due to procedural   sedation/anesthesia.  The following day: return to full activity including work.  DIET:  Eat and drink normally unless instructed otherwise.     TREATMENT FOR COMMON SIDE EFFECTS:  - Mild abdominal pain, nausea, belching, bloating or excessive gas:  rest,   eat lightly and use a heating pad.  - Sore Throat: treat with throat lozenges and/or gargle with warm salt   water.  - Because air was used during the procedure, expelling large amounts of air   from your rectum or belching is normal.  - If a bowel prep was taken, you may not have a bowel movement for 1-3 days.    This is normal.  SYMPTOMS TO WATCH FOR AND REPORT TO YOUR PHYSICIAN:  1. Abdominal pain or bloating, other than gas cramps.  2. Chest pain.  3. Back pain.  4. Signs of infection such as: chills or fever occurring within 24 hours   after the procedure.  5. Rectal bleeding, which would show as bright red, maroon, or black stools.   (A tablespoon of blood from the rectum is not serious, especially if    hemorrhoids are present.)  6. Vomiting.  7. Weakness or dizziness.  GO DIRECTLY TO THE NEAREST EMERGENCY ROOM IF YOU HAVE ANY OF THE FOLLOWING:      Difficulty breathing              Chills and/or fever over 101 F   Persistent vomiting and/or vomiting blood   Severe abdominal pain   Severe chest pain   Black, tarry stools   Bleeding- more than one tablespoon   Any other symptom or condition that you feel may need urgent attention  Your doctor recommends these additional instructions:  If any biopsies were taken, your doctors clinic will contact you in 1 to 2   weeks with any results.  - [facility]   - Await cytology results and cyst fluid analysis results.  - Suspect cyst fluid analysis will confirm that branch duct IPMN   (pre-malignant type condition that will require surveillance). No EUS   findings that suggest pancreatic cancer at this time.  For questions, problems or results please call your physician - Charlie Smith MD at Work:  [phone].  [facility] [place], EMERGENCY ROOM PHONE NUMBER: [phone]  IF A COMPLICATION OR EMERGENCY SITUATION ARISES AND YOU ARE UNABLE TO REACH   YOUR PHYSICIAN - GO DIRECTLY TO THE EMERGENCY ROOM.  Charlie Smith MD  10/4/2022 11:23:09 AM  This report has been verified and signed electronically.  Dear patient,  As a result of recent federal legislation (The Federal Cures Act), you may   receive lab or pathology results from your procedure in your [facility]   account before your physician is able to contact you. Your physician or   their representative will relay the results to you with their   recommendations at their soonest availability.  Thank you,  [employer]ATION

## 2022-10-04 NOTE — NURSING
Notified MD. SHANNON Macedo of SV02 change from 61 to 51. CVP 4. MAP 80. MD stated he will run hemodynamics.

## 2022-10-05 NOTE — PT/OT/SLP PROGRESS
Physical Therapy      Patient Name:  Audrey Oneil Jr.   MRN:  [id number]    Patient not seen today secondary to Other (Comment). Pt sleeping on initial attempt, per RN patient did not sleep well last night.  Pt remains on track to complete POC.  Will follow-up when appropriate.

## 2022-10-05 NOTE — PROGRESS NOTES
[facility] - Cardiac Intensive [facility]  Heart Transplant  Progress Note    Patient Name: Audrey Oneil Jr.  MRN: [id number]  Admission Date: 9/14/2022  Hospital Length of Stay: 21 days  Attending Physician: Edison Marshall MD  Primary Care Provider: Primary Doctor No  Principal Problem:Acute on chronic combined systolic and diastolic heart failure    Subjective:     Interval History: Complaints of abdominal pain today worse with food intake ,  post EUS with biopsy of pancreatic lesion yesterday.On Dobutmine 2.5( baseline) and off pressors since 10/2.   Will stepdown to floor tomorrow.     Hemodynamics  CVP 9, Mvo2 51  CO-4.1  CI-1.9  [id number]  Continuous Infusions:   sodium chloride 0.9% Stopped (09/28/22 1550)    sodium chloride 0.9% 5 mL/hr at 10/03/22 0445    DOBUTamine IV infusion (non-titrating) 2.5 mcg/kg/min (10/05/22 0900)    heparin (porcine) in D5W 18 Units/kg/hr (10/05/22 0900)     Scheduled Meds:   acetaminophen  650 mg Oral QID    allopurinoL  200 mg Oral Daily    amiodarone  300 mg Oral Daily    aspirin  81 mg Oral Daily    atorvastatin  80 mg Oral Daily    DAPTOmycin (CUBICIN) IV (PEDS and ADULTS)  10 mg/kg Intravenous Q24H    famotidine (PF)  20 mg Intravenous Daily    LIDOcaine  1 patch Transdermal Q24H    polyethylene glycol  17 g Oral Daily    potassium chloride  40 mEq Oral Once    predniSONE  30 mg Oral Daily     PRN Meds:sodium chloride, acetaminophen, artificial tears, dextromethorphan-guaiFENesin  mg, heparin (PORCINE), heparin (PORCINE), HYDROcodone-acetaminophen, melatonin, methocarbamoL, ondansetron, senna-docusate 8.6-50 mg, sodium chloride 0.9%    Review of patient's allergies indicates:   Allergen Reactions    Iodine containing multivitamin Swelling     itching    Keflex [cephalexin] Swelling     Eyes.  Tolerated multiple doses of zosyn and 1 dose of augmentin in 2015 and 2016, respectively    Peaches [peach (prunus persica)] Swelling     eyes    Shellfish containing  products Swelling    Fig tree Swelling     itching    Tuberculin spenser test ppd Rash     Objective:     Vital Signs (Most Recent):  Temp: 97.7 °F (36.5 °C) (10/05/22 0701)  Pulse: 98 (10/05/22 0900)  Resp: 15 (10/05/22 0900)  BP: 111/61 (10/05/22 0900)  SpO2: 96 % (10/05/22 0906)   Vital Signs (24h Range):  Temp:  [97.7 °F (36.5 °C)-98.4 °F (36.9 °C)] 97.7 °F (36.5 °C)  Pulse:  [] 98  Resp:  [14-54] 15  SpO2:  [90 %-100 %] 96 %  BP: (111-189)/(61-96) 111/61  Arterial Line BP: (120-159)/(54-76) 144/65     Patient Vitals for the past 72 hrs (Last 3 readings):   Weight   10/04/22 0500 88.5 kg (195 lb 1.7 oz)       Body mass index is 30.56 kg/m².      Intake/Output Summary (Last 24 hours) at 10/5/2022 1056  Last data filed at 10/5/2022 0900  Gross per 24 hour   Intake 986.45 ml   Output 1005 ml   Net -18.55 ml           Physical Exam  Constitutional:       General: He is not in acute distress.     Appearance: Normal appearance. He is normal weight. He is not ill-appearing or toxic-appearing.   HENT:      Head: Normocephalic and atraumatic.      Nose: Nose normal. No congestion.      Mouth/Throat:      Mouth: Mucous membranes are moist.      Pharynx: No oropharyngeal exudate.   Eyes:      General:         Right eye: No discharge.         Left eye: No discharge.      Extraocular Movements: Extraocular movements intact.      Pupils: Pupils are equal, round, and reactive to light.   Cardiovascular:      Rate and Rhythm: Normal rate and regular rhythm.      Heart sounds: No murmur heard.    No friction rub. No gallop.   Pulmonary:      Effort: Pulmonary effort is normal. No respiratory distress.      Breath sounds: Normal breath sounds. No wheezing, rhonchi or rales.   Abdominal:      General: Abdomen is flat. Bowel sounds are normal. There is no distension.      Palpations: Abdomen is soft.      Tenderness: There is no abdominal tenderness.   Musculoskeletal:         General: No swelling. Normal range of motion.       Cervical back: Normal range of motion. No rigidity.      Right lower leg: No edema.      Left lower leg: No edema.   Skin:     General: Skin is warm and dry.      Findings: No lesion or rash.   Neurological:      General: No focal deficit present.      Mental Status: He is alert and oriented to person, place, and time.      Cranial Nerves: No cranial nerve deficit.      Motor: No weakness.       Significant Labs:  CBC:  Recent Labs   Lab 10/03/22  0217 10/04/22  0354 10/05/22  0417   WBC 6.66 5.60 9.11   RBC 3.32* 3.19* 3.32*   HGB 9.2* 8.8* 9.1*   HCT 29.1* 27.7* 28.9*    270 320   MCV 88 87 87   MCH 27.7 27.6 27.4   MCHC 31.6* 31.8* 31.5*       BNP:  No results for input(s): BNP in the last 168 hours.    Invalid input(s): BNPTRIAGELBLO  CMP:  Recent Labs   Lab 10/01/22  1139 10/01/22  2246 10/02/22  0351 10/03/22  0506 10/04/22  0354 10/05/22  0417    141* 105 93 122* 108   CALCIUM 8.8 8.9 8.5* 9.1 9.2 9.3   ALBUMIN 3.0* 3.1* 2.9*  --   --   --    PROT 6.5 6.7 6.5  --   --   --    * 135* 135* 136 136 136   K 3.2* 3.8 3.7 3.6 3.9 3.9   CO2 19* 21* 22* 24 23 23   CL 98 99 98 99 102 103   BUN 33* 31* 29* 27* 28* 35*   CREATININE 2.0* 2.1* 2.0* 2.2* 1.7* 1.7*   ALKPHOS 80 81 83  --   --   --    ALT 13 13 13  --   --   --    AST 13 15 15  --   --   --    BILITOT 0.9 0.8 0.8  --   --   --         Coagulation:   Recent Labs   Lab 10/01/22  1547 10/01/22  2246 10/03/22  0837 10/04/22  0354 10/05/22  0417   INR 1.0  --   --   --   --    APTT 28.7   < > 45.8* 41.9*  41.9* 40.2*    < > = values in this interval not displayed.       LDH:  No results for input(s): LDH in the last 72 hours.  Microbiology:  Microbiology Results (last 7 days)       Procedure Component Value Units Date/Time    Blood culture [[id number]] Collected: 09/30/22 0241    Order Status: Completed Specimen: Blood from Peripheral, Hand, Right Updated: 10/05/22 0612     Blood Culture, Routine No growth after 5 days.    Blood culture  [[id number]] Collected: 09/30/22 0243    Order Status: Completed Specimen: Blood from Peripheral, Antecubital, Right Updated: 10/05/22 0612     Blood Culture, Routine No growth after 5 days.    Culture, Anaerobe [[id number]] Collected: 09/27/22 1026    Order Status: Completed Specimen: Abscess from Heart Updated: 10/04/22 0933     Anaerobic Culture No anaerobes isolated    Narrative:      LV Lead Tip    Culture, Anaerobe [[id number]] Collected: 09/27/22 1133    Order Status: Completed Specimen: Abscess from Heart Updated: 10/04/22 0933     Anaerobic Culture No anaerobes isolated    Narrative:      RA Lead Tip    Culture, Anaerobe [[id number]] Collected: 09/27/22 1032    Order Status: Completed Specimen: Abscess from Heart Updated: 10/04/22 0932     Anaerobic Culture No anaerobes isolated    Narrative:      RA Lead drainage #1    Culture, Anaerobe [[id number]] Collected: 09/27/22 1035    Order Status: Completed Specimen: Abscess from Heart Updated: 10/04/22 0932     Anaerobic Culture No anaerobes isolated    Narrative:      RA Lead drainage #2    Culture, Anaerobe [[id number]] Collected: 09/27/22 0947    Order Status: Completed Specimen: Abscess from Chest, Left Updated: 10/04/22 0932     Anaerobic Culture No anaerobes isolated    Narrative:      Deep Pocket #1    Culture, Anaerobe [[id number]] Collected: 09/27/22 0952    Order Status: Completed Specimen: Abscess from Chest, Left Updated: 10/04/22 0932     Anaerobic Culture No anaerobes isolated    Narrative:      Deep Pocket #2    Culture, Anaerobe [[id number]] Collected: 09/27/22 0936    Order Status: Completed Specimen: Abscess from Chest, Left Updated: 10/04/22 0932     Anaerobic Culture No anaerobes isolated    Narrative:      Shallow Pocket    Culture, Anaerobe [[id number]] Collected: 09/27/22 1138    Order Status: Completed Specimen: Abscess from Heart Updated: 10/03/22 1051     Anaerobic Culture No anaerobes isolated    Narrative:      RV Lead Tip    Culture,  Respiratory with Gram Stain [[id number]] Collected: 10/01/22 1041    Order Status: Completed Specimen: Respiratory from Endotracheal Aspirate Updated: 10/03/22 1043     Respiratory Culture Normal respiratory jadiel      No S aureus or Pseudomonas isolated.     Gram Stain (Respiratory) <10 epithelial cells per low power field.     Gram Stain (Respiratory) Rare WBC's     Gram Stain (Respiratory) No organisms seen    Aerobic culture [[id number]] Collected: 09/27/22 1133    Order Status: Completed Specimen: Abscess from Heart Updated: 09/30/22 1153     Aerobic Bacterial Culture No growth    Narrative:      RA Lead Tip    Aerobic culture [[id number]] Collected: 09/27/22 0952    Order Status: Completed Specimen: Abscess from Chest, Left Updated: 09/30/22 1153     Aerobic Bacterial Culture No growth    Narrative:      Deep Pocket #2    Aerobic culture [[id number]] Collected: 09/27/22 1032    Order Status: Completed Specimen: Abscess from Heart Updated: 09/30/22 1153     Aerobic Bacterial Culture No growth    Narrative:      RA Lead drainage #1    Aerobic culture [[id number]] Collected: 09/27/22 0947    Order Status: Completed Specimen: Abscess from Chest, Left Updated: 09/30/22 1153     Aerobic Bacterial Culture No growth    Narrative:      Deep Pocket #1    Aerobic culture [[id number]] Collected: 09/27/22 1026    Order Status: Completed Specimen: Abscess from Heart Updated: 09/30/22 1153     Aerobic Bacterial Culture No growth    Narrative:      LV Lead Tip    Aerobic culture [[id number]] Collected: 09/27/22 1035    Order Status: Completed Specimen: Abscess from Heart Updated: 09/30/22 1153     Aerobic Bacterial Culture No growth    Narrative:      RA Lead drainage #2    Aerobic culture [[id number]] Collected: 09/27/22 1138    Order Status: Completed Specimen: Abscess from Heart Updated: 09/30/22 1153     Aerobic Bacterial Culture No growth    Narrative:      RV Lead Tip    Aerobic culture [[id number]] Collected: 09/27/22 0936     Order Status: Completed Specimen: Abscess from Chest, Left Updated: 09/30/22 1153     Aerobic Bacterial Culture No growth    Narrative:      Shallow pocket            BMP:   Recent Labs   Lab 10/05/22  0417         K 3.9      CO2 23   BUN 35*   CREATININE 1.7*   CALCIUM 9.3   MG 2.3       I have reviewed all pertinent labs within the past 24 hours.    Estimated Creatinine Clearance: 42.9 mL/min (A) (based on SCr of 1.7 mg/dL (H)).    Diagnostic Results:  Reviewed     Assessment and Plan:   71 yo WM being worked up for LVAD since hospitalization January 2022 for recurrent VT (he thinks had MI) and cardiogenic shock at [facility]. He was  later seen in [facility] where he was treated for cardiogenic shock and sent home on .  He remains on  and is pursuing evaluation for LVAD.     His case was discussed at selection committee and he was deferred pending evaluation of pancreatic mass.  They wish to proceed with MRI but not sure with his ICD if this will be possible.Presented with MRSA bacteremia on admission.   HARRY 9/21/22: Not concerning for Vegetations. BCx negative 9/18.      9/27 CRT generator and lead extraction, pocket cx => hypotensive post-procedure requiring Epi, ICU  9/28 off Epi, transfer to CSU  9/29 sudden hypoxemic respiratory failure requiring urgent intubation after sats dropped while laying flat for PICC line => tx ICU, panculture       Acute Hypoxic respiratory failure 9/29/22 -resolved  -suspect aspiration event vs PE. Completed 5 day course with zosyn 10/4/22  -intubated and sedated 9/29, extubated 10/1/22  -Saturating well on room air.      * Chronic combined systolic and diastolic congestive heart failure  - On Dobutamine 2.5 which is his baseline and off pressors since 10/2  ,MAPS >60.   - off diuretics currently. Cr improved to 1.7( baseline 1.4-1.7)  CVP 9 this am   -CO 4.1, CI 1.9, SVR 1385.  - Daily weights, Strict I/Os  - Monitor electrolytes and Maintain Mg >2 and K  >4  -Telemetry monitoring     Pancreatic lesion  - Gastroenterology on board  - EUS with biopsy  1//4, results pending.  - complaining of abdominal pain post procedure, already on pepcid, Had bm this am . will monitor.  Bacteremia due to methicillin resistant Staphylococcus aureus  - Monitor WBC count and fever curve, pan-culture if patient spikes  - ID consult and recommendations are appreciated  - On Dapto currently end date 11/11 per ID ( 6 wks after device removal 9/27) for the MRSA bacteremia 9/14, 9/15, 9/16 positive for MRSA. 9/18 NG  - completed 5day  Course of zosyn 10/4/22 for aspiration pneumonia.   -9/30 BCX negative , resp cultures 10/1 NG     H/O ventricular tachycardia  - Amiodarone 300 mg, daily  - Monitor LFTs.      Coronary artery disease involving native coronary artery of native heart without angina pectoris  - Asprin 81 mg, daily  - Atorvastatin 80 mg, nightl     Left Brachial Vein Thrombosis 9/21  - on Heparin      Acute on Chronic Gout  - pred 30 mg daily for 5 days, currently day 3. Expect elevation in wbc.   -On allopurinol 200 mg       Benton Rendon MD  Heart Transplant  [facility] - [facility]

## 2022-10-05 NOTE — PLAN OF CARE
CICU DAILY GOALS       A: Awake    RASS: Goal - RASS Goal: 0-->alert and calm  Actual - RASS (Kilpatrick Agitation-Sedation Scale): 0-->alert and calm   Restraint necessity: Clinical Justification: Treatment Interference, Removing medical devices  B: Breath   SBT: Not intubated   C: Coordinate A & B, analgesics/sedatives   Pain: managed    SAT: Not intubated  D: Delirium   CAM-ICU: Overall CAM-ICU: Negative  E: Early(intubated/ Progressive (non-intubated) Mobility   MOVE Screen: Pass   Activity: Activity Management: Ankle pumps - L1, Arm raise - L1  FAS: Feeding/Nutrition   Diet order: Diet/Nutrition Received: 2 gram sodium, low saturated fat/low cholesterol,   Fluid restriction: Fluid Requirement: 1500cc FR  T: Thrombus   DVT prophylaxis: VTE Required Core Measure: Pharmacological prophylaxis initiated/maintained  H: HOB Elevation   Head of Bed (HOB) Positioning: HOB elevated  U: Ulcer Prophylaxis   GI: no  G: Glucose control   managed Glycemic Management: blood glucose monitored  S: Skin   Bundle compliance: yes   Bathing/Skin Care: back care, bath, complete, dressed/undressed, incontinence care, linen changed Date: PM Bath  B: Bowel Function   no issues   I: Indwelling Catheters   Seay necessity: [REMOVED]      Urethral Catheter 09/27/22 0755 Double-lumen 16 Fr.-Reason for Continuing Urinary Catheterization: Post operative, Critically ill in ICU and requiring hourly monitoring of intake/output  [REMOVED]      Urethral Catheter 09/29/22 1600-Reason for Continuing Urinary Catheterization: Urinary retention, Critically ill in ICU and requiring hourly monitoring of intake/output   CVC necessity: Yes   IPAD offered: No  D: De-escalation Antibx   Yes  Plan for the day   Monitor VS and UOP. CVP 9. SV02 51    Family/Goals of care/Code Status   Code Status: Full Code     No acute events throughout day, VS and assessment per flow sheet, patient progressing towards goals as tolerated, plan of care reviewed with Audrey GEORGE  Thad Mccarthy and family, all concerns addressed, will continue to monitor

## 2022-10-05 NOTE — TREATMENT PLAN
BRIEF ADVANCED ENDOSCOPY TREATMENT PLAN    - No acute events overnight. Heparin gtt resumed after procedure.   - Patient tolerating diet. No complaints of significant abdominal pain.     Vitals:    10/05/22 0701   BP: (!) 174/96   Pulse: 90   Resp: (!) 35   Temp:         Findings on EUS yesterday:                         - Multiple stones were visualized                          endosonographically in the gallbladder.                          - Multiple cystic lesions were seen in the                          pancreatic head, pancreatic body and pancreatic                          tail. Tissue was obtained from this exam, and                          results are pending. However, the endosonographic                          appearance is suggestive of a branched intraductal                          papillary mucinous neoplasm. Fine needle                          aspiration for fluid performed.     PLAN:                        - Await cytology results and cyst fluid analysis results.                          - Suspect cyst fluid analysis will confirm that                          branch duct IPMN (pre-malignant type condition                          that will require surveillance). No EUS findings                          that suggest pancreatic cancer at this time.     We will sign off. Please contact with further questions.     Demario Nieto MD   PGY-4, Gastroenterology

## 2022-10-05 NOTE — SUBJECTIVE & OBJECTIVE
Interval History: Complaints of abdominal pain today worse with food intake ,  post EUS with biopsy of pancreatic lesion yesterday.On Dobutmine 2.5( baseline) and off pressors since 10/2.   Will stepdown to floor tomorrow.     Hemodynamics  CVP 9, Mvo2 51  CO-4.1  CI-1.9  [id number]  Continuous Infusions:   sodium chloride 0.9% Stopped (09/28/22 1550)    sodium chloride 0.9% 5 mL/hr at 10/03/22 0445    DOBUTamine IV infusion (non-titrating) 2.5 mcg/kg/min (10/05/22 0900)    heparin (porcine) in D5W 18 Units/kg/hr (10/05/22 0900)     Scheduled Meds:   acetaminophen  650 mg Oral QID    allopurinoL  200 mg Oral Daily    amiodarone  300 mg Oral Daily    aspirin  81 mg Oral Daily    atorvastatin  80 mg Oral Daily    DAPTOmycin (CUBICIN) IV (PEDS and ADULTS)  10 mg/kg Intravenous Q24H    famotidine (PF)  20 mg Intravenous Daily    LIDOcaine  1 patch Transdermal Q24H    polyethylene glycol  17 g Oral Daily    potassium chloride  40 mEq Oral Once    predniSONE  30 mg Oral Daily     PRN Meds:sodium chloride, acetaminophen, artificial tears, dextromethorphan-guaiFENesin  mg, heparin (PORCINE), heparin (PORCINE), HYDROcodone-acetaminophen, melatonin, methocarbamoL, ondansetron, senna-docusate 8.6-50 mg, sodium chloride 0.9%    Review of patient's allergies indicates:   Allergen Reactions    Iodine containing multivitamin Swelling     itching    Keflex [cephalexin] Swelling     Eyes.  Tolerated multiple doses of zosyn and 1 dose of augmentin in 2015 and 2016, respectively    Peaches [peach (prunus persica)] Swelling     eyes    Shellfish containing products Swelling    Fig tree Swelling     itching    Tuberculin spenser test ppd Rash     Objective:     Vital Signs (Most Recent):  Temp: 97.7 °F (36.5 °C) (10/05/22 0701)  Pulse: 98 (10/05/22 0900)  Resp: 15 (10/05/22 0900)  BP: 111/61 (10/05/22 0900)  SpO2: 96 % (10/05/22 0906)   Vital Signs (24h Range):  Temp:  [97.7 °F (36.5 °C)-98.4 °F (36.9 °C)] 97.7 °F (36.5 °C)  Pulse:   [] 98  Resp:  [14-54] 15  SpO2:  [90 %-100 %] 96 %  BP: (111-189)/(61-96) 111/61  Arterial Line BP: (120-159)/(54-76) 144/65     Patient Vitals for the past 72 hrs (Last 3 readings):   Weight   10/04/22 0500 88.5 kg (195 lb 1.7 oz)       Body mass index is 30.56 kg/m².      Intake/Output Summary (Last 24 hours) at 10/5/2022 1056  Last data filed at 10/5/2022 0900  Gross per 24 hour   Intake 986.45 ml   Output 1005 ml   Net -18.55 ml           Physical Exam  Constitutional:       General: He is not in acute distress.     Appearance: Normal appearance. He is normal weight. He is not ill-appearing or toxic-appearing.   HENT:      Head: Normocephalic and atraumatic.      Nose: Nose normal. No congestion.      Mouth/Throat:      Mouth: Mucous membranes are moist.      Pharynx: No oropharyngeal exudate.   Eyes:      General:         Right eye: No discharge.         Left eye: No discharge.      Extraocular Movements: Extraocular movements intact.      Pupils: Pupils are equal, round, and reactive to light.   Cardiovascular:      Rate and Rhythm: Normal rate and regular rhythm.      Heart sounds: No murmur heard.    No friction rub. No gallop.   Pulmonary:      Effort: Pulmonary effort is normal. No respiratory distress.      Breath sounds: Normal breath sounds. No wheezing, rhonchi or rales.   Abdominal:      General: Abdomen is flat. Bowel sounds are normal. There is no distension.      Palpations: Abdomen is soft.      Tenderness: There is no abdominal tenderness.   Musculoskeletal:         General: No swelling. Normal range of motion.      Cervical back: Normal range of motion. No rigidity.      Right lower leg: No edema.      Left lower leg: No edema.   Skin:     General: Skin is warm and dry.      Findings: No lesion or rash.   Neurological:      General: No focal deficit present.      Mental Status: He is alert and oriented to person, place, and time.      Cranial Nerves: No cranial nerve deficit.      Motor:  No weakness.       Significant Labs:  CBC:  Recent Labs   Lab 10/03/22  0217 10/04/22  0354 10/05/22  0417   WBC 6.66 5.60 9.11   RBC 3.32* 3.19* 3.32*   HGB 9.2* 8.8* 9.1*   HCT 29.1* 27.7* 28.9*    270 320   MCV 88 87 87   MCH 27.7 27.6 27.4   MCHC 31.6* 31.8* 31.5*       BNP:  No results for input(s): BNP in the last 168 hours.    Invalid input(s): BNPTRIAGELBLO  CMP:  Recent Labs   Lab 10/01/22  1139 10/01/22  2246 10/02/22  0351 10/03/22  0506 10/04/22  0354 10/05/22  0417    141* 105 93 122* 108   CALCIUM 8.8 8.9 8.5* 9.1 9.2 9.3   ALBUMIN 3.0* 3.1* 2.9*  --   --   --    PROT 6.5 6.7 6.5  --   --   --    * 135* 135* 136 136 136   K 3.2* 3.8 3.7 3.6 3.9 3.9   CO2 19* 21* 22* 24 23 23   CL 98 99 98 99 102 103   BUN 33* 31* 29* 27* 28* 35*   CREATININE 2.0* 2.1* 2.0* 2.2* 1.7* 1.7*   ALKPHOS 80 81 83  --   --   --    ALT 13 13 13  --   --   --    AST 13 15 15  --   --   --    BILITOT 0.9 0.8 0.8  --   --   --         Coagulation:   Recent Labs   Lab 10/01/22  1547 10/01/22  2246 10/03/22  0837 10/04/22  0354 10/05/22  0417   INR 1.0  --   --   --   --    APTT 28.7   < > 45.8* 41.9*  41.9* 40.2*    < > = values in this interval not displayed.       LDH:  No results for input(s): LDH in the last 72 hours.  Microbiology:  Microbiology Results (last 7 days)       Procedure Component Value Units Date/Time    Blood culture [[id number]] Collected: 09/30/22 0241    Order Status: Completed Specimen: Blood from Peripheral, Hand, Right Updated: 10/05/22 0612     Blood Culture, Routine No growth after 5 days.    Blood culture [[id number]] Collected: 09/30/22 0243    Order Status: Completed Specimen: Blood from Peripheral, Antecubital, Right Updated: 10/05/22 0612     Blood Culture, Routine No growth after 5 days.    Culture, Anaerobe [[id number]] Collected: 09/27/22 1026    Order Status: Completed Specimen: Abscess from Heart Updated: 10/04/22 0933     Anaerobic Culture No anaerobes isolated    Narrative:       LV Lead Tip    Culture, Anaerobe [[id number]] Collected: 09/27/22 1133    Order Status: Completed Specimen: Abscess from Heart Updated: 10/04/22 0933     Anaerobic Culture No anaerobes isolated    Narrative:      RA Lead Tip    Culture, Anaerobe [[id number]] Collected: 09/27/22 1032    Order Status: Completed Specimen: Abscess from Heart Updated: 10/04/22 0932     Anaerobic Culture No anaerobes isolated    Narrative:      RA Lead drainage #1    Culture, Anaerobe [[id number]] Collected: 09/27/22 1035    Order Status: Completed Specimen: Abscess from Heart Updated: 10/04/22 0932     Anaerobic Culture No anaerobes isolated    Narrative:      RA Lead drainage #2    Culture, Anaerobe [[id number]] Collected: 09/27/22 0947    Order Status: Completed Specimen: Abscess from Chest, Left Updated: 10/04/22 0932     Anaerobic Culture No anaerobes isolated    Narrative:      Deep Pocket #1    Culture, Anaerobe [[id number]] Collected: 09/27/22 0952    Order Status: Completed Specimen: Abscess from Chest, Left Updated: 10/04/22 0932     Anaerobic Culture No anaerobes isolated    Narrative:      Deep Pocket #2    Culture, Anaerobe [[id number]] Collected: 09/27/22 0936    Order Status: Completed Specimen: Abscess from Chest, Left Updated: 10/04/22 0932     Anaerobic Culture No anaerobes isolated    Narrative:      Shallow Pocket    Culture, Anaerobe [[id number]] Collected: 09/27/22 1138    Order Status: Completed Specimen: Abscess from Heart Updated: 10/03/22 1051     Anaerobic Culture No anaerobes isolated    Narrative:      RV Lead Tip    Culture, Respiratory with Gram Stain [[id number]] Collected: 10/01/22 1041    Order Status: Completed Specimen: Respiratory from Endotracheal Aspirate Updated: 10/03/22 1043     Respiratory Culture Normal respiratory jadiel      No S aureus or Pseudomonas isolated.     Gram Stain (Respiratory) <10 epithelial cells per low power field.     Gram Stain (Respiratory) Rare WBC's     Gram Stain (Respiratory)  No organisms seen    Aerobic culture [[id number]] Collected: 09/27/22 1133    Order Status: Completed Specimen: Abscess from Heart Updated: 09/30/22 1153     Aerobic Bacterial Culture No growth    Narrative:      RA Lead Tip    Aerobic culture [[id number]] Collected: 09/27/22 0952    Order Status: Completed Specimen: Abscess from Chest, Left Updated: 09/30/22 1153     Aerobic Bacterial Culture No growth    Narrative:      Deep Pocket #2    Aerobic culture [[id number]] Collected: 09/27/22 1032    Order Status: Completed Specimen: Abscess from Heart Updated: 09/30/22 1153     Aerobic Bacterial Culture No growth    Narrative:      RA Lead drainage #1    Aerobic culture [[id number]] Collected: 09/27/22 0947    Order Status: Completed Specimen: Abscess from Chest, Left Updated: 09/30/22 1153     Aerobic Bacterial Culture No growth    Narrative:      Deep Pocket #1    Aerobic culture [[id number]] Collected: 09/27/22 1026    Order Status: Completed Specimen: Abscess from Heart Updated: 09/30/22 1153     Aerobic Bacterial Culture No growth    Narrative:      LV Lead Tip    Aerobic culture [[id number]] Collected: 09/27/22 1035    Order Status: Completed Specimen: Abscess from Heart Updated: 09/30/22 1153     Aerobic Bacterial Culture No growth    Narrative:      RA Lead drainage #2    Aerobic culture [[id number]] Collected: 09/27/22 1138    Order Status: Completed Specimen: Abscess from Heart Updated: 09/30/22 1153     Aerobic Bacterial Culture No growth    Narrative:      RV Lead Tip    Aerobic culture [[id number]] Collected: 09/27/22 0936    Order Status: Completed Specimen: Abscess from Chest, Left Updated: 09/30/22 1153     Aerobic Bacterial Culture No growth    Narrative:      Shallow pocket            BMP:   Recent Labs   Lab 10/05/22  0417         K 3.9      CO2 23   BUN 35*   CREATININE 1.7*   CALCIUM 9.3   MG 2.3       I have reviewed all pertinent labs within the past 24 hours.    Estimated Creatinine  Clearance: 42.9 mL/min (A) (based on SCr of 1.7 mg/dL (H)).    Diagnostic Results:  Reviewed

## 2022-10-05 NOTE — PLAN OF CARE
"    HTS PLAN OF CARE     Date:  10/4/2022  Requesting attending:  Edison Marshall MD    Problem List:   71 yo m with:  ICM LVEF 15%   Recurrent VT    LAVERNE     Overnight:   Temp:  [97.8 °F (36.6 °C)-98.2 °F (36.8 °C)] 97.8 °F (36.6 °C)  Pulse:  [] 87  Resp:  [14-54] 14  SpO2:  [90 %-99 %] 99 %  Arterial Line BP: (120-159)/(52-76) 151/72  BP (!) 136/57   Pulse 87   Temp 97.8 °F (36.6 °C) (Oral)   Resp 14   Ht 5' 7" (1.702 m)   Wt 88.5 kg (195 lb 1.7 oz)   SpO2 99%   BMI 30.56 kg/m²     Hemodynamics 8 pm:   MAP 86  CVP 9  SvO2 51  Hgb 8.8  CO 5.3  CI 2.5  SVR 1149    UOP:    Intake/Output Summary (Last 24 hours) at 10/4/2022 2310  Last data filed at 10/4/2022 2300  Gross per 24 hour   Intake 998.59 ml   Output 915 ml   Net 83.59 ml     I/O this shift:  In: 90.5 [I.V.:21.9; IV Piggyback:68.6]  Out: 100 [Urine:100]  Last Bowel Movement: 10/04/22    Current Heart Failure Medications:  - Dobutamine 2.5 mcg/kg/min    Current Mechanical Circulatory Support:      Percutaneous Central Line Insertion/Assessment - Triple Lumen  09/27/22 0745 left internal jugular (Active)   Number of days: 7            Peripheral IV - Single Lumen 10/01/22 0000 20 G Anterior;Right Shoulder (Active)   Number of days: 3            Peripheral IV - Single Lumen 10/01/22 0000 20 G Anterior;Right Wrist (Active)   Number of days: 3       Arterial Line Left (Active)   Number of days:      Scheduled Meds:       acetaminophen  650 mg Oral QID    allopurinoL  200 mg Oral Daily    amiodarone  300 mg Oral Daily    aspirin  81 mg Oral Daily    atorvastatin  80 mg Oral Daily    DAPTOmycin (CUBICIN) IV (PEDS and ADULTS)  10 mg/kg Intravenous Q24H    famotidine (PF)  20 mg Intravenous Daily    LIDOcaine  1 patch Transdermal Q24H    piperacillin-tazobactam (ZOSYN) IVPB  4.5 g Intravenous Q8H    polyethylene glycol  17 g Oral Daily    potassium chloride  40 mEq Oral Once    predniSONE  30 mg Oral Daily     Continuous Infusions:      sodium chloride 0.9% " Stopped (09/28/22 1550)    sodium chloride 0.9% 5 mL/hr at 10/03/22 0445    DOBUTamine IV infusion (non-titrating) 2.5 mcg/kg/min (10/04/22 2300)    EPINEPHrine Stopped (09/30/22 0207)    heparin (porcine) in D5W 18 Units/kg/hr (10/04/22 2029)     PRN Meds:   sodium chloride, acetaminophen, artificial tears, dextromethorphan-guaiFENesin  mg, heparin (PORCINE), heparin (PORCINE), HYDROcodone-acetaminophen, melatonin, methocarbamoL, ondansetron, senna-docusate 8.6-50 mg, sodium chloride 0.9%    Pertinent Data:    IMAGING   Results for orders placed during the hospital encounter of 09/14/22    Echo    Interpretation Summary  · Moderate left atrial enlargement.  · The left ventricle is severely enlarged with eccentric hypertrophy and severely decreased systolic function.  · There is severe left ventricular global hypokinesis with anterospetal and apical scar. The estimated ejection fraction is 10-15%.  · Left ventricular diastolic dysfunction.  · Normal right ventricular size with normal right ventricular systolic function.  · Mild mitral regurgitation.  · Normal central venous pressure (3 mmHg).  · There is no significant tricuspid regurgitation and therefore the pulmonary artery systolic pressure cannot be reported.    EF   Date Value Ref Range Status   09/27/2022 15 % Final   09/21/2022 15 % Final     Results for orders placed during the hospital encounter of 06/16/22    LABS:    Lab Results   Component Value Date    WBC 5.60 10/04/2022    WBC 6.66 10/03/2022    WBC 6.88 10/02/2022    HGB 8.8 (L) 10/04/2022    HGB 9.2 (L) 10/03/2022    HGB 8.9 (L) 10/02/2022     10/04/2022     10/03/2022     10/02/2022     Lab Results   Component Value Date     10/04/2022     10/03/2022     (L) 10/02/2022    K 3.9 10/04/2022     10/04/2022    CO2 23 10/04/2022    BUN 28 (H) 10/04/2022    CREATININE 1.7 (H) 10/04/2022    CREATININE 2.2 (H) 10/03/2022    CREATININE 2.0 (H) 10/02/2022     ESTGFRAFRICA 38.0 (A) 07/22/2022    EGFRNONAA 32.8 (A) 07/22/2022    ANIONGAP 11 10/04/2022    CALCIUM 9.2 10/04/2022    PROT 6.5 10/02/2022        Impression:   72 y.o. male with PMH of ICM LVEF 15%, recurrent VT admitted for advance options. Currently on dobutamine 2.5. His case was discussed at selection committee and he was deferred pending evaluation of pancreatic mass. Underwent EUS and biopsy of pancreatic lesion this am, pending results.     In summary, in this patient with ICM LVEF 15%, recurrent VT admitted for advance options. Currently on dobutamine 2.5 with stable hemodynamics, LAVERNE improving (creatinine trending down) pending evaluation of pancreatic mass for discussion of advance options at selection committee. At this moment, as hemodynamics are stable, we will continue the same treatment plan.     Plan:   -Continue the same plan of care     Pt discussed with the attending physician, Dr. Marshall of the HTS service.     Ramu Hendricks MD  [facility]  PGY4 Cardiology Fellow

## 2022-10-06 NOTE — PT/OT/SLP PROGRESS
Occupational Therapy   Co-Treatment    Name: Audrey Oneil Jr.  MRN: [id number]  Admitting Diagnosis:  Acute on chronic combined systolic and diastolic heart failure  2 Days Post-Op    Recommendations:     Discharge Recommendations: home  Discharge Equipment Recommendations:  none  Barriers to discharge:  None    Assessment:     Audrey Oneil Jr. is a 72 y.o. male with a medical diagnosis of Acute on chronic combined systolic and diastolic heart failure.  He presents with no further reports of gout pain and ability to complete ADL and mobility with SBA, but easy fatigability. Performance deficits affecting function are weakness, impaired endurance, impaired self care skills, impaired functional mobility, gait instability, impaired cardiopulmonary response to activity, impaired balance.     Rehab Prognosis:  Good; patient would benefit from acute skilled OT services to address these deficits and reach maximum level of function.       Plan:     Patient to be seen 3 x/week to address the above listed problems via self-care/home management, therapeutic activities, therapeutic exercises  Plan of Care Expires: 11/02/22  Plan of Care Reviewed with: patient    Subjective     Pain/Comfort:  Pain Rating 1: 0/10  Pain Rating Post-Intervention 1: 0/10    Objective:     Communicated with: RN prior to session.  Patient found sitting edge of bed with blood pressure cuff, pulse ox (continuous), telemetry, central line, peripheral IV upon OT entry to room.    General Precautions: Standard, fall   Orthopedic Precautions:N/A   Braces:    Respiratory Status: Room air     Occupational Performance:     Bed Mobility:    NT     Functional Mobility/Transfers:  Patient completed Sit <> Stand Transfer with stand by assistance  with  rolling walker   Functional Mobility: CGA using RW around room during self-care and in hallway to simulate HH distances    Activities of Daily Living:  Grooming: independence    Upper Body Dressing: stand by  assistance    Lower Body Dressing: stand by assistance    Toileting: contact guard assistance          AMPAC 6 Click ADL: 20    Treatment & Education:  Pt ed on OT POC  Pt ed on pacing activities   Pt ed on ROM ex's 3x daily for increased overall strength and endurance    Patient left up in chair with all lines intact, call button in reach, RN notified, and spouse present    GOALS:   Multidisciplinary Problems       Occupational Therapy Goals          Problem: Occupational Therapy    Goal Priority Disciplines Outcome Interventions   Occupational Therapy Goal     OT, PT/OT Ongoing, Progressing    Description: Goals to be met by: 10/13/22     Patient will increase functional independence with ADLs by performing:    UE Dressing with Supervision.  LE Dressing with Supervision.  Grooming while standing at sink with Supervision.  Toileting from toilet with Supervision for hygiene and clothing management.   Toilet transfer to toilet with Supervision.                         Time Tracking:     OT Date of Treatment: 10/06/22  OT Start Time: 0948  OT Stop Time: 1012  OT Total Time (min): 24 min    Billable Minutes:Self Care/Home Management 15  Therapeutic Activity 8               10/6/2022

## 2022-10-06 NOTE — PLAN OF CARE
".[facility] PLAN OF CARE     Date:  10/6/2022  Requesting attending:  Edison Marshall MD    Problem List:   71 yo m with:  ICM LVEF 15%   Recurrent VT    LAVERNE     Overnight:   Temp:  [97.7 °F (36.5 °C)-98.1 °F (36.7 °C)] 98 °F (36.7 °C)  Pulse:  [] 100  Resp:  [14-44] 44  SpO2:  [90 %-100 %] 98 %  BP: ()/(50-96) 125/80  Arterial Line BP: (122-159)/(54-74) 144/65  /80 (BP Location: Right arm, Patient Position: Lying)   Pulse 100   Temp 98 °F (36.7 °C) (Oral)   Resp (!) 44   Ht 5' 7" (1.702 m)   Wt 88.5 kg (195 lb 1.7 oz)   SpO2 98%   BMI 30.56 kg/m²     Hemodynamics 8 pm:   MAP  84  CVP  9  SvO2  61  Hgb (in g/dL) 9.1  Wt (in kg) 88.5  BSA  2.1  SaO2  100  CO 6.5  CI 3.1      UOP:    Intake/Output Summary (Last 24 hours) at 10/6/2022 0235  Last data filed at 10/6/2022 0200  Gross per 24 hour   Intake 1773.83 ml   Output 645 ml   Net 1128.83 ml     I/O this shift:  In: 946.7 [P.O.:720; I.V.:180.3; IV Piggyback:46.4]  Out: 250 [Urine:250]  Last Bowel Movement: 10/06/22    Current Heart Failure Medications:  - Dobutamine 2.5 mcg/kg/min    Current Mechanical Circulatory Support:      Percutaneous Central Line Insertion/Assessment - Triple Lumen  09/27/22 0745 left internal jugular (Active)   Number of days: 7            Peripheral IV - Single Lumen 10/01/22 0000 20 G Anterior;Right Shoulder (Active)   Number of days: 3            Peripheral IV - Single Lumen 10/01/22 0000 20 G Anterior;Right Wrist (Active)   Number of days: 3       Arterial Line Left (Active)   Number of days:      Scheduled Meds:       acetaminophen  650 mg Oral QID    allopurinoL  200 mg Oral Daily    aluminum-magnesium hydroxide-simethicone  30 mL Oral Once    And    LIDOcaine HCl 2%  15 mL Oral Once    amiodarone  300 mg Oral Daily    aspirin  81 mg Oral Daily    atorvastatin  80 mg Oral Daily    DAPTOmycin (CUBICIN) IV (PEDS and ADULTS)  10 mg/kg Intravenous Q24H    famotidine (PF)  20 mg Intravenous Daily    LIDOcaine  " 1 patch Transdermal Q24H    polyethylene glycol  17 g Oral Daily    potassium chloride  40 mEq Oral Once    predniSONE  30 mg Oral Daily    sucralfate  1 g Oral BID     Continuous Infusions:      sodium chloride 0.9% Stopped (09/28/22 1550)    sodium chloride 0.9% 5 mL/hr at 10/06/22 0105    DOBUTamine IV infusion (non-titrating) 2.5 mcg/kg/min (10/06/22 0200)    heparin (porcine) in D5W 19 Units/kg/hr (10/06/22 0200)     PRN Meds:   sodium chloride, acetaminophen, artificial tears, dextromethorphan-guaiFENesin  mg, heparin (PORCINE), heparin (PORCINE), HYDROcodone-acetaminophen, melatonin, methocarbamoL, ondansetron, senna-docusate 8.6-50 mg, sodium chloride 0.9%    Pertinent Data:    IMAGING   Results for orders placed during the hospital encounter of 09/14/22    Echo    Interpretation Summary  · Moderate left atrial enlargement.  · The left ventricle is severely enlarged with eccentric hypertrophy and severely decreased systolic function.  · There is severe left ventricular global hypokinesis with anterospetal and apical scar. The estimated ejection fraction is 10-15%.  · Left ventricular diastolic dysfunction.  · Normal right ventricular size with normal right ventricular systolic function.  · Mild mitral regurgitation.  · Normal central venous pressure (3 mmHg).  · There is no significant tricuspid regurgitation and therefore the pulmonary artery systolic pressure cannot be reported.    EF   Date Value Ref Range Status   09/27/2022 15 % Final   09/21/2022 15 % Final     Results for orders placed during the hospital encounter of 06/16/22    LABS:    Lab Results   Component Value Date    WBC 9.11 10/05/2022    WBC 5.60 10/04/2022    WBC 6.66 10/03/2022    HGB 9.1 (L) 10/05/2022    HGB 8.8 (L) 10/04/2022    HGB 9.2 (L) 10/03/2022     10/05/2022     10/04/2022     10/03/2022     Lab Results   Component Value Date     10/05/2022     10/04/2022     10/03/2022    K 3.9  10/05/2022     10/05/2022    CO2 23 10/05/2022    BUN 35 (H) 10/05/2022    CREATININE 1.7 (H) 10/05/2022    CREATININE 1.7 (H) 10/04/2022    CREATININE 2.2 (H) 10/03/2022    ESTGFRAFRICA 38.0 (A) 07/22/2022    EGFRNONAA 32.8 (A) 07/22/2022    ANIONGAP 10 10/05/2022    CALCIUM 9.3 10/05/2022    PROT 6.5 10/02/2022        Impression:   72 y.o. male with PMH of ICM LVEF 15%, recurrent VT admitted for advance options. Currently on dobutamine 2.5. His case was discussed at selection committee and he was deferred pending evaluation of pancreatic mass. Underwent EUS and biopsy of pancreatic lesion on 10/4/22, pending results.     In summary, in this patient with ICM LVEF 15%, recurrent VT admitted for advance options. Currently on dobutamine 2.5 with stable hemodynamics, LAVERNE improving (creatinine trending down) pending evaluation of pancreatic mass for discussion of advance options at selection committee. At this moment, as hemodynamics are stable, we will continue the same treatment plan.     Plan:   -Continue the same plan of care     Pt discussed with the attending physician, Dr. Marshall of the HTS service.     Ramu Hendricks MD  [facility]  PGY4 Cardiology Fellow

## 2022-10-06 NOTE — SUBJECTIVE & OBJECTIVE
Interval History: NAEON.On Dobutmine 2.5( baseline) and off pressors since 10/2.   Will stepdown to floor today.     Hemodynamics  CVP 9, Mvo2 55    Continuous Infusions:   sodium chloride 0.9% Stopped (09/28/22 1550)    sodium chloride 0.9% 5 mL/hr at 10/06/22 0105    DOBUTamine IV infusion (non-titrating) 2.5 mcg/kg/min (10/06/22 0900)    heparin (porcine) in D5W 19 Units/kg/hr (10/06/22 0900)     Scheduled Meds:   acetaminophen  650 mg Oral QID    allopurinoL  200 mg Oral Daily    amiodarone  300 mg Oral Daily    aspirin  81 mg Oral Daily    atorvastatin  80 mg Oral Daily    DAPTOmycin (CUBICIN) IV (PEDS and ADULTS)  10 mg/kg Intravenous Q24H    [START ON 10/7/2022] famotidine  20 mg Oral Daily    LIDOcaine  1 patch Transdermal Q24H    LIDOcaine HCl 2%  15 mL Oral Once    polyethylene glycol  17 g Oral Daily    potassium chloride  40 mEq Oral Once    predniSONE  30 mg Oral Daily    sucralfate  1 g Oral BID     PRN Meds:sodium chloride, acetaminophen, artificial tears, dextromethorphan-guaiFENesin  mg, heparin (PORCINE), heparin (PORCINE), HYDROcodone-acetaminophen, melatonin, methocarbamoL, ondansetron, senna-docusate 8.6-50 mg, sodium chloride 0.9%    Review of patient's allergies indicates:   Allergen Reactions    Iodine containing multivitamin Swelling     itching    Keflex [cephalexin] Swelling     Eyes.  Tolerated multiple doses of zosyn and 1 dose of augmentin in 2015 and 2016, respectively    Peaches [peach (prunus persica)] Swelling     eyes    Shellfish containing products Swelling    Fig tree Swelling     itching    Tuberculin spenser test ppd Rash     Objective:     Vital Signs (Most Recent):  Temp: 98.4 °F (36.9 °C) (10/06/22 0800)  Pulse: 110 (10/06/22 1000)  Resp: (!) 34 (10/06/22 1000)  BP: 139/65 (10/06/22 1000)  SpO2: 96 % (10/06/22 1000)   Vital Signs (24h Range):  Temp:  [98 °F (36.7 °C)-98.4 °F (36.9 °C)] 98.4 °F (36.9 °C)  Pulse:  [] 110  Resp:  [15-46] 34  SpO2:  [85 %-100 %] 96  %  BP: ()/(50-80) 139/65     Patient Vitals for the past 72 hrs (Last 3 readings):   Weight   10/06/22 0400 93.2 kg (205 lb 9.3 oz)   10/04/22 0500 88.5 kg (195 lb 1.7 oz)       Body mass index is 32.2 kg/m².      Intake/Output Summary (Last 24 hours) at 10/6/2022 1134  Last data filed at 10/6/2022 0900  Gross per 24 hour   Intake 1626.58 ml   Output 535 ml   Net 1091.58 ml           Physical Exam  Constitutional:       General: He is not in acute distress.     Appearance: Normal appearance. He is normal weight. He is not ill-appearing or toxic-appearing.   HENT:      Head: Normocephalic and atraumatic.      Nose: Nose normal. No congestion.      Mouth/Throat:      Mouth: Mucous membranes are moist.      Pharynx: No oropharyngeal exudate.   Eyes:      General:         Right eye: No discharge.         Left eye: No discharge.      Extraocular Movements: Extraocular movements intact.      Pupils: Pupils are equal, round, and reactive to light.   Cardiovascular:      Rate and Rhythm: Normal rate and regular rhythm.      Heart sounds: No murmur heard.    No friction rub. No gallop.   Pulmonary:      Effort: Pulmonary effort is normal. No respiratory distress.      Breath sounds: Normal breath sounds. No wheezing, rhonchi or rales.   Abdominal:      General: Abdomen is flat. Bowel sounds are normal. There is no distension.      Palpations: Abdomen is soft.      Tenderness: There is no abdominal tenderness.   Musculoskeletal:         General: No swelling. Normal range of motion.      Cervical back: Normal range of motion. No rigidity.      Right lower leg: No edema.      Left lower leg: No edema.   Skin:     General: Skin is warm and dry.      Findings: No lesion or rash.   Neurological:      General: No focal deficit present.      Mental Status: He is alert and oriented to person, place, and time.      Cranial Nerves: No cranial nerve deficit.      Motor: No weakness.       Significant Labs:  CBC:  Recent Labs   Lab  10/04/22  0354 10/05/22  0417 10/06/22  0425   WBC 5.60 9.11 7.07   RBC 3.19* 3.32* 3.21*   HGB 8.8* 9.1* 8.9*   HCT 27.7* 28.9* 28.3*    320 306   MCV 87 87 88   MCH 27.6 27.4 27.7   MCHC 31.8* 31.5* 31.4*       BNP:  No results for input(s): BNP in the last 168 hours.    Invalid input(s): BNPTRIAGELBLO  CMP:  Recent Labs   Lab 10/01/22  1139 10/01/22  2246 10/02/22  0351 10/03/22  0506 10/04/22  0354 10/05/22  0417 10/06/22  0425    141* 105   < > 122* 108 142*   CALCIUM 8.8 8.9 8.5*   < > 9.2 9.3 8.9   ALBUMIN 3.0* 3.1* 2.9*  --   --   --   --    PROT 6.5 6.7 6.5  --   --   --   --    * 135* 135*   < > 136 136 141   K 3.2* 3.8 3.7   < > 3.9 3.9 4.2   CO2 19* 21* 22*   < > 23 23 25   CL 98 99 98   < > 102 103 106   BUN 33* 31* 29*   < > 28* 35* 38*   CREATININE 2.0* 2.1* 2.0*   < > 1.7* 1.7* 1.5*   ALKPHOS 80 81 83  --   --   --   --    ALT 13 13 13  --   --   --   --    AST 13 15 15  --   --   --   --    BILITOT 0.9 0.8 0.8  --   --   --   --     < > = values in this interval not displayed.        Coagulation:   Recent Labs   Lab 10/01/22  1547 10/01/22  2246 10/05/22  1646 10/06/22  0114 10/06/22  0830   INR 1.0  --   --   --   --    APTT 28.7   < > 31.6 76.6* 54.7*    < > = values in this interval not displayed.       LDH:  No results for input(s): LDH in the last 72 hours.  Microbiology:  Microbiology Results (last 7 days)       Procedure Component Value Units Date/Time    Blood culture [[id number]] Collected: 09/30/22 0241    Order Status: Completed Specimen: Blood from Peripheral, Hand, Right Updated: 10/05/22 0612     Blood Culture, Routine No growth after 5 days.    Blood culture [[id number]] Collected: 09/30/22 0243    Order Status: Completed Specimen: Blood from Peripheral, Antecubital, Right Updated: 10/05/22 0612     Blood Culture, Routine No growth after 5 days.    Culture, Anaerobe [[id number]] Collected: 09/27/22 1026    Order Status: Completed Specimen: Abscess from Heart Updated:  10/04/22 0933     Anaerobic Culture No anaerobes isolated    Narrative:      LV Lead Tip    Culture, Anaerobe [[id number]] Collected: 09/27/22 1133    Order Status: Completed Specimen: Abscess from Heart Updated: 10/04/22 0933     Anaerobic Culture No anaerobes isolated    Narrative:      RA Lead Tip    Culture, Anaerobe [[id number]] Collected: 09/27/22 1032    Order Status: Completed Specimen: Abscess from Heart Updated: 10/04/22 0932     Anaerobic Culture No anaerobes isolated    Narrative:      RA Lead drainage #1    Culture, Anaerobe [[id number]] Collected: 09/27/22 1035    Order Status: Completed Specimen: Abscess from Heart Updated: 10/04/22 0932     Anaerobic Culture No anaerobes isolated    Narrative:      RA Lead drainage #2    Culture, Anaerobe [[id number]] Collected: 09/27/22 0947    Order Status: Completed Specimen: Abscess from Chest, Left Updated: 10/04/22 0932     Anaerobic Culture No anaerobes isolated    Narrative:      Deep Pocket #1    Culture, Anaerobe [[id number]] Collected: 09/27/22 0952    Order Status: Completed Specimen: Abscess from Chest, Left Updated: 10/04/22 0932     Anaerobic Culture No anaerobes isolated    Narrative:      Deep Pocket #2    Culture, Anaerobe [[id number]] Collected: 09/27/22 0936    Order Status: Completed Specimen: Abscess from Chest, Left Updated: 10/04/22 0932     Anaerobic Culture No anaerobes isolated    Narrative:      Shallow Pocket    Culture, Anaerobe [[id number]] Collected: 09/27/22 1138    Order Status: Completed Specimen: Abscess from Heart Updated: 10/03/22 1051     Anaerobic Culture No anaerobes isolated    Narrative:      RV Lead Tip    Culture, Respiratory with Gram Stain [[id number]] Collected: 10/01/22 1041    Order Status: Completed Specimen: Respiratory from Endotracheal Aspirate Updated: 10/03/22 1043     Respiratory Culture Normal respiratory jadiel      No S aureus or Pseudomonas isolated.     Gram Stain (Respiratory) <10 epithelial cells per low power  field.     Gram Stain (Respiratory) Rare WBC's     Gram Stain (Respiratory) No organisms seen    Aerobic culture [[id number]] Collected: 09/27/22 1133    Order Status: Completed Specimen: Abscess from Heart Updated: 09/30/22 1153     Aerobic Bacterial Culture No growth    Narrative:      RA Lead Tip    Aerobic culture [[id number]] Collected: 09/27/22 0952    Order Status: Completed Specimen: Abscess from Chest, Left Updated: 09/30/22 1153     Aerobic Bacterial Culture No growth    Narrative:      Deep Pocket #2    Aerobic culture [[id number]] Collected: 09/27/22 1032    Order Status: Completed Specimen: Abscess from Heart Updated: 09/30/22 1153     Aerobic Bacterial Culture No growth    Narrative:      RA Lead drainage #1    Aerobic culture [[id number]] Collected: 09/27/22 0947    Order Status: Completed Specimen: Abscess from Chest, Left Updated: 09/30/22 1153     Aerobic Bacterial Culture No growth    Narrative:      Deep Pocket #1    Aerobic culture [[id number]] Collected: 09/27/22 1026    Order Status: Completed Specimen: Abscess from Heart Updated: 09/30/22 1153     Aerobic Bacterial Culture No growth    Narrative:      LV Lead Tip    Aerobic culture [[id number]] Collected: 09/27/22 1035    Order Status: Completed Specimen: Abscess from Heart Updated: 09/30/22 1153     Aerobic Bacterial Culture No growth    Narrative:      RA Lead drainage #2    Aerobic culture [[id number]] Collected: 09/27/22 1138    Order Status: Completed Specimen: Abscess from Heart Updated: 09/30/22 1153     Aerobic Bacterial Culture No growth    Narrative:      RV Lead Tip    Aerobic culture [[id number]] Collected: 09/27/22 0936    Order Status: Completed Specimen: Abscess from Chest, Left Updated: 09/30/22 1153     Aerobic Bacterial Culture No growth    Narrative:      Shallow pocket            BMP:   Recent Labs   Lab 10/06/22  0425   *      K 4.2      CO2 25   BUN 38*   CREATININE 1.5*   CALCIUM 8.9   MG 2.2       I have  reviewed all pertinent labs within the past 24 hours.    Estimated Creatinine Clearance: 49.9 mL/min (A) (based on SCr of 1.5 mg/dL (H)).    Diagnostic Results:  Reviewed

## 2022-10-06 NOTE — SUBJECTIVE & OBJECTIVE
Subjective:     Interval History: Audrey Oneil Jr. Is a 72 y.o. male 2 days s/p EUS with FNA of pancreas cysts. Endoscopic appearance of cystic lesions was suspicious for IPMN. Cytology was negative for malignancy.  Upon assessment, Mr. Oneil reports midepigastric pain, worse with deep palpation. He additionally complains of multiple episodes of non bloody diarrhea. VS notable for tachycardia and tachypnea.     Review of Systems   Constitutional:  Negative for chills, diaphoresis and fever.   Eyes:  Negative for discharge and redness.   Gastrointestinal:  Positive for abdominal distention and diarrhea. Negative for abdominal pain, blood in stool, constipation, nausea and vomiting.   Skin:  Negative for color change.   Neurological:  Negative for syncope and speech difficulty.   Objective:     Vital Signs (Most Recent):  Temp: 98.4 °F (36.9 °C) (10/06/22 0800)  Pulse: 110 (10/06/22 1000)  Resp: (!) 34 (10/06/22 1000)  BP: 139/65 (10/06/22 1000)  SpO2: 96 % (10/06/22 1000)   Vital Signs (24h Range):  Temp:  [98 °F (36.7 °C)-98.4 °F (36.9 °C)] 98.4 °F (36.9 °C)  Pulse:  [] 110  Resp:  [15-46] 34  SpO2:  [85 %-100 %] 96 %  BP: ()/(50-80) 139/65     Weight: 93.2 kg (205 lb 9.3 oz) (10/06/22 0400)  Body mass index is 32.2 kg/m².      Intake/Output Summary (Last 24 hours) at 10/6/2022 1125  Last data filed at 10/6/2022 0900  Gross per 24 hour   Intake 1626.58 ml   Output 535 ml   Net 1091.58 ml       Lines/Drains/Airways       Central Venous Catheter Line  Duration             Percutaneous Central Line Insertion/Assessment - Triple Lumen  09/27/22 0745 left internal jugular 9 days              Peripheral Intravenous Line  Duration                  Peripheral IV - Single Lumen 10/06/22 0430 20 G Posterior;Right Forearm <1 day                    Physical Exam  Vitals and nursing note reviewed.   Constitutional:       General: He is not in acute distress.     Appearance: He is obese. He is not diaphoretic.    Eyes:      General: No scleral icterus.  Abdominal:      General: Abdomen is protuberant. There is no distension.      Palpations: Abdomen is soft.      Tenderness: There is abdominal tenderness in the epigastric area. There is no guarding or rebound.   Skin:     General: Skin is warm and dry.      Coloration: Skin is not jaundiced.   Neurological:      Mental Status: He is alert and oriented to person, place, and time.       Significant Labs:  BMP:   Recent Labs   Lab 10/06/22  0425   *      K 4.2      CO2 25   BUN 38*   CREATININE 1.5*   CALCIUM 8.9   MG 2.2     CMP:   Recent Labs   Lab 10/06/22  0425   *   CALCIUM 8.9      K 4.2   CO2 25      BUN 38*   CREATININE 1.5*     Coagulation:   Recent Labs   Lab 10/06/22  0830   APTT 54.7*         Significant Imaging:  Imaging results within the past 24 hours have been reviewed.

## 2022-10-06 NOTE — PROGRESS NOTES
Interdisciplinary Rounds Report:   Attended interdisciplinary rounds with the [facility]/CTS services including the LVAD Coordinators, social workers, cardiologists, surgeons,  PT/OT/Speech, dietician, and unit charge nurses. Discussed patient status, plan of care, goals of care, including DC date, and post discharge needs. Plan of care will be discussed with the patient and/or family per the physician while rounding on the floor. This is a recurring meeting that is medically and socially necessary to collaborate with the interdisciplinary team to assist patient needs and safe discharge.

## 2022-10-06 NOTE — PROGRESS NOTES
[facility] - Cardiac [facility]  Heart Transplant  Progress Note    Patient Name: Audrey Oneil Jr.  MRN: [id number]  Admission Date: 9/14/2022  Hospital Length of Stay: 22 days  Attending Physician: Edison Marshall MD  Primary Care Provider: Primary Doctor No  Principal Problem:Acute on chronic combined systolic and diastolic heart failure    Subjective:     Interval History: NAEON.On Dobutmine 2.5( baseline) and off pressors since 10/2.   Will stepdown to floor today.     Hemodynamics  CVP 9, Mvo2 55  CO 4.6 , CI 2.1, SVR 1408    Continuous Infusions:   sodium chloride 0.9% Stopped (09/28/22 1550)    sodium chloride 0.9% 5 mL/hr at 10/06/22 0105    DOBUTamine IV infusion (non-titrating) 2.5 mcg/kg/min (10/06/22 0900)    heparin (porcine) in D5W 19 Units/kg/hr (10/06/22 0900)     Scheduled Meds:   acetaminophen  650 mg Oral QID    allopurinoL  200 mg Oral Daily    amiodarone  300 mg Oral Daily    aspirin  81 mg Oral Daily    atorvastatin  80 mg Oral Daily    DAPTOmycin (CUBICIN) IV (PEDS and ADULTS)  10 mg/kg Intravenous Q24H    [START ON 10/7/2022] famotidine  20 mg Oral Daily    LIDOcaine  1 patch Transdermal Q24H    LIDOcaine HCl 2%  15 mL Oral Once    polyethylene glycol  17 g Oral Daily    potassium chloride  40 mEq Oral Once    predniSONE  30 mg Oral Daily    sucralfate  1 g Oral BID     PRN Meds:sodium chloride, acetaminophen, artificial tears, dextromethorphan-guaiFENesin  mg, heparin (PORCINE), heparin (PORCINE), HYDROcodone-acetaminophen, melatonin, methocarbamoL, ondansetron, senna-docusate 8.6-50 mg, sodium chloride 0.9%    Review of patient's allergies indicates:   Allergen Reactions    Iodine containing multivitamin Swelling     itching    Keflex [cephalexin] Swelling     Eyes.  Tolerated multiple doses of zosyn and 1 dose of augmentin in 2015 and 2016, respectively    Peaches [peach (prunus persica)] Swelling     eyes    Shellfish containing products Swelling    Fig tree  Swelling     itching    Tuberculin spenser test ppd Rash     Objective:     Vital Signs (Most Recent):  Temp: 98.4 °F (36.9 °C) (10/06/22 0800)  Pulse: 110 (10/06/22 1000)  Resp: (!) 34 (10/06/22 1000)  BP: 139/65 (10/06/22 1000)  SpO2: 96 % (10/06/22 1000)   Vital Signs (24h Range):  Temp:  [98 °F (36.7 °C)-98.4 °F (36.9 °C)] 98.4 °F (36.9 °C)  Pulse:  [] 110  Resp:  [15-46] 34  SpO2:  [85 %-100 %] 96 %  BP: ()/(50-80) 139/65     Patient Vitals for the past 72 hrs (Last 3 readings):   Weight   10/06/22 0400 93.2 kg (205 lb 9.3 oz)   10/04/22 0500 88.5 kg (195 lb 1.7 oz)       Body mass index is 32.2 kg/m².      Intake/Output Summary (Last 24 hours) at 10/6/2022 1134  Last data filed at 10/6/2022 0900  Gross per 24 hour   Intake 1626.58 ml   Output 535 ml   Net 1091.58 ml           Physical Exam  Constitutional:       General: He is not in acute distress.     Appearance: Normal appearance. He is normal weight. He is not ill-appearing or toxic-appearing.   HENT:      Head: Normocephalic and atraumatic.      Nose: Nose normal. No congestion.      Mouth/Throat:      Mouth: Mucous membranes are moist.      Pharynx: No oropharyngeal exudate.   Eyes:      General:         Right eye: No discharge.         Left eye: No discharge.      Extraocular Movements: Extraocular movements intact.      Pupils: Pupils are equal, round, and reactive to light.   Cardiovascular:      Rate and Rhythm: Normal rate and regular rhythm.      Heart sounds: No murmur heard.    No friction rub. No gallop.   Pulmonary:      Effort: Pulmonary effort is normal. No respiratory distress.      Breath sounds: Normal breath sounds. No wheezing, rhonchi or rales.   Abdominal:      General: Abdomen is flat. Bowel sounds are normal. There is no distension.      Palpations: Abdomen is soft.      Tenderness: There is no abdominal tenderness.   Musculoskeletal:         General: No swelling. Normal range of motion.      Cervical back: Normal range of  motion. No rigidity.      Right lower leg: No edema.      Left lower leg: No edema.   Skin:     General: Skin is warm and dry.      Findings: No lesion or rash.   Neurological:      General: No focal deficit present.      Mental Status: He is alert and oriented to person, place, and time.      Cranial Nerves: No cranial nerve deficit.      Motor: No weakness.       Significant Labs:  CBC:  Recent Labs   Lab 10/04/22  0354 10/05/22  0417 10/06/22  0425   WBC 5.60 9.11 7.07   RBC 3.19* 3.32* 3.21*   HGB 8.8* 9.1* 8.9*   HCT 27.7* 28.9* 28.3*    320 306   MCV 87 87 88   MCH 27.6 27.4 27.7   MCHC 31.8* 31.5* 31.4*       BNP:  No results for input(s): BNP in the last 168 hours.    Invalid input(s): BNPTRIAGELBLO  CMP:  Recent Labs   Lab 10/01/22  1139 10/01/22  2246 10/02/22  0351 10/03/22  0506 10/04/22  0354 10/05/22  0417 10/06/22  0425    141* 105   < > 122* 108 142*   CALCIUM 8.8 8.9 8.5*   < > 9.2 9.3 8.9   ALBUMIN 3.0* 3.1* 2.9*  --   --   --   --    PROT 6.5 6.7 6.5  --   --   --   --    * 135* 135*   < > 136 136 141   K 3.2* 3.8 3.7   < > 3.9 3.9 4.2   CO2 19* 21* 22*   < > 23 23 25   CL 98 99 98   < > 102 103 106   BUN 33* 31* 29*   < > 28* 35* 38*   CREATININE 2.0* 2.1* 2.0*   < > 1.7* 1.7* 1.5*   ALKPHOS 80 81 83  --   --   --   --    ALT 13 13 13  --   --   --   --    AST 13 15 15  --   --   --   --    BILITOT 0.9 0.8 0.8  --   --   --   --     < > = values in this interval not displayed.        Coagulation:   Recent Labs   Lab 10/01/22  1547 10/01/22  2246 10/05/22  1646 10/06/22  0114 10/06/22  0830   INR 1.0  --   --   --   --    APTT 28.7   < > 31.6 76.6* 54.7*    < > = values in this interval not displayed.       LDH:  No results for input(s): LDH in the last 72 hours.  Microbiology:  Microbiology Results (last 7 days)       Procedure Component Value Units Date/Time    Blood culture [[id number]] Collected: 09/30/22 0241    Order Status: Completed Specimen: Blood from Peripheral, Hand,  Right Updated: 10/05/22 0612     Blood Culture, Routine No growth after 5 days.    Blood culture [[id number]] Collected: 09/30/22 0243    Order Status: Completed Specimen: Blood from Peripheral, Antecubital, Right Updated: 10/05/22 0612     Blood Culture, Routine No growth after 5 days.    Culture, Anaerobe [[id number]] Collected: 09/27/22 1026    Order Status: Completed Specimen: Abscess from Heart Updated: 10/04/22 0933     Anaerobic Culture No anaerobes isolated    Narrative:      LV Lead Tip    Culture, Anaerobe [[id number]] Collected: 09/27/22 1133    Order Status: Completed Specimen: Abscess from Heart Updated: 10/04/22 0933     Anaerobic Culture No anaerobes isolated    Narrative:      RA Lead Tip    Culture, Anaerobe [[id number]] Collected: 09/27/22 1032    Order Status: Completed Specimen: Abscess from Heart Updated: 10/04/22 0932     Anaerobic Culture No anaerobes isolated    Narrative:      RA Lead drainage #1    Culture, Anaerobe [[id number]] Collected: 09/27/22 1035    Order Status: Completed Specimen: Abscess from Heart Updated: 10/04/22 0932     Anaerobic Culture No anaerobes isolated    Narrative:      RA Lead drainage #2    Culture, Anaerobe [[id number]] Collected: 09/27/22 0947    Order Status: Completed Specimen: Abscess from Chest, Left Updated: 10/04/22 0932     Anaerobic Culture No anaerobes isolated    Narrative:      Deep Pocket #1    Culture, Anaerobe [[id number]] Collected: 09/27/22 0952    Order Status: Completed Specimen: Abscess from Chest, Left Updated: 10/04/22 0932     Anaerobic Culture No anaerobes isolated    Narrative:      Deep Pocket #2    Culture, Anaerobe [[id number]] Collected: 09/27/22 0936    Order Status: Completed Specimen: Abscess from Chest, Left Updated: 10/04/22 0932     Anaerobic Culture No anaerobes isolated    Narrative:      Shallow Pocket    Culture, Anaerobe [[id number]] Collected: 09/27/22 1138    Order Status: Completed Specimen: Abscess from Heart Updated: 10/03/22  1051     Anaerobic Culture No anaerobes isolated    Narrative:      RV Lead Tip    Culture, Respiratory with Gram Stain [[id number]] Collected: 10/01/22 1041    Order Status: Completed Specimen: Respiratory from Endotracheal Aspirate Updated: 10/03/22 1043     Respiratory Culture Normal respiratory jadiel      No S aureus or Pseudomonas isolated.     Gram Stain (Respiratory) <10 epithelial cells per low power field.     Gram Stain (Respiratory) Rare WBC's     Gram Stain (Respiratory) No organisms seen    Aerobic culture [[id number]] Collected: 09/27/22 1133    Order Status: Completed Specimen: Abscess from Heart Updated: 09/30/22 1153     Aerobic Bacterial Culture No growth    Narrative:      RA Lead Tip    Aerobic culture [[id number]] Collected: 09/27/22 0952    Order Status: Completed Specimen: Abscess from Chest, Left Updated: 09/30/22 1153     Aerobic Bacterial Culture No growth    Narrative:      Deep Pocket #2    Aerobic culture [[id number]] Collected: 09/27/22 1032    Order Status: Completed Specimen: Abscess from Heart Updated: 09/30/22 1153     Aerobic Bacterial Culture No growth    Narrative:      RA Lead drainage #1    Aerobic culture [[id number]] Collected: 09/27/22 0947    Order Status: Completed Specimen: Abscess from Chest, Left Updated: 09/30/22 1153     Aerobic Bacterial Culture No growth    Narrative:      Deep Pocket #1    Aerobic culture [[id number]] Collected: 09/27/22 1026    Order Status: Completed Specimen: Abscess from Heart Updated: 09/30/22 1153     Aerobic Bacterial Culture No growth    Narrative:      LV Lead Tip    Aerobic culture [[id number]] Collected: 09/27/22 1035    Order Status: Completed Specimen: Abscess from Heart Updated: 09/30/22 1153     Aerobic Bacterial Culture No growth    Narrative:      RA Lead drainage #2    Aerobic culture [[id number]] Collected: 09/27/22 1138    Order Status: Completed Specimen: Abscess from Heart Updated: 09/30/22 1153     Aerobic Bacterial Culture No growth     Narrative:      RV Lead Tip    Aerobic culture [[id number]] Collected: 09/27/22 0936    Order Status: Completed Specimen: Abscess from Chest, Left Updated: 09/30/22 1153     Aerobic Bacterial Culture No growth    Narrative:      Shallow pocket            BMP:   Recent Labs   Lab 10/06/22  0425   *      K 4.2      CO2 25   BUN 38*   CREATININE 1.5*   CALCIUM 8.9   MG 2.2       I have reviewed all pertinent labs within the past 24 hours.    Estimated Creatinine Clearance: 49.9 mL/min (A) (based on SCr of 1.5 mg/dL (H)).    Diagnostic Results:  Reviewed     Assessment and Plan:     73 yo WM being worked up for LVAD since hospitalization January 2022 for recurrent VT (he thinks had MI) and cardiogenic shock at [facility]. He was  later seen in [facility] where he was treated for cardiogenic shock and sent home on .  He remains on  and is pursuing evaluation for LVAD.     His case was discussed at selection committee and he was deferred pending evaluation of pancreatic mass.  They wish to proceed with MRI but not sure with his ICD if this will be possible.Presented with MRSA bacteremia on admission.   HARRY 9/21/22: Not concerning for Vegetations. BCx negative 9/18.      9/27 CRT generator and lead extraction, pocket cx => hypotensive post-procedure requiring Epi, ICU  9/28 off Epi, transfer to CSU  9/29 sudden hypoxemic respiratory failure requiring urgent intubation after sats dropped while laying flat for PICC line => tx ICU, panculture       Acute Hypoxic respiratory failure 9/29/22 -resolved  -suspect aspiration event vs PE. Completed 5 day course with zosyn 10/4/22  -intubated and sedated 9/29, extubated 10/1/22  -Saturating well on room air.   -step down to floor today.     * Chronic combined systolic and diastolic congestive heart failure  - On Dobutamine 2.5 which is his baseline and off pressors since 10/2  ,MAPS >60.   - off diuretics currently. Cr improved to 1.5( baseline 1.4-1.7)  CVP 9 this  am   -CO 4.6 , CI 2.1, SVR 1408  - Daily weights, Strict I/Os  - Monitor electrolytes and Maintain Mg >2 and K >4  -Telemetry monitoring     Pancreatic lesion  - Gastroenterology on board  - EUS with biopsy  10/4, cytology results -negative for malignant cells, c/w cyst contents.     Bacteremia due to methicillin resistant Staphylococcus aureus  - Monitor WBC count and fever curve, pan-culture if patient spikes  - ID consult and recommendations are appreciated  - On Dapto currently end date 11/11 per ID ( 6 wks after device removal 9/27) for the MRSA bacteremia 9/14, 9/15, 9/16 positive for MRSA. 9/18 NG  - completed 5day  Course of zosyn 10/4/22 for aspiration pneumonia.   -9/30 BCX negative , resp cultures 10/1 NG     H/O ventricular tachycardia  - Amiodarone 300 mg, daily  - Monitor LFTs.        Coronary artery disease involving native coronary artery of native heart without angina pectoris  - Asprin 81 mg, daily  - Atorvastatin 80 mg, nightl     Left Brachial Vein Thrombosis 9/21  - on Heparin      Acute on Chronic Gout  - pred 30 mg daily for 4 days completes course today  -On allopurinol 200 mg       Benton Rendon MD  Heart Transplant  [facility] - [facility]

## 2022-10-06 NOTE — PT/OT/SLP PROGRESS
Physical Therapy Treatment    Patient Name:  Audrey Oneil Jr.   MRN:  [id number]  Admit Date: 2022  Admitting Diagnosis:  Acute on chronic combined systolic and diastolic heart failure   Length of Stay: 22 days  Recent Surgery: Procedure(s) (LRB):  ULTRASOUND, UPPER GI TRACT, ENDOSCOPIC (N/A) 2 Days Post-Op    Recommendations:     Discharge Recommendations:  home   Discharge Equipment Recommendations: none   Barriers to discharge: None    Plan:     During this hospitalization, patient to be seen 2 x/week to address the listed problems via gait training, therapeutic activities, therapeutic exercises, neuromuscular re-education  Plan of Care Expires:  22  Plan of Care Reviewed with: patient    Assessment:     Audrey Oneil Jr. is a 72 y.o. male admitted with a medical diagnosis of Acute on chronic combined systolic and diastolic heart failure. Pt found alert and cooperative. Pt with gout pain resolved which significantly improved mobility performance. Pt able to ambulate in the hallway with one person assistance but required HHA due to mild instability     Problem List: weakness, impaired endurance, gait instability, impaired functional mobility, impaired cardiopulmonary response to activity.  Rehab Prognosis: Good     GOALS:   Multidisciplinary Problems       Physical Therapy Goals          Problem: Physical Therapy    Goal Priority Disciplines Outcome Goal Variances Interventions   Physical Therapy Goal     PT, PT/OT Ongoing, Progressing     Description: Goals to be met by: 22    Patient will increase functional independence with mobility by performin. Sit to stand transfer with independence and maintaining sternal precautions- not met  2. Gait  x 300 feet with independence using LRAD as needed- not met  3. Ascend/descend 5 stair with bilateral handrails modified independence and maintaining sternal precautions using LRAD as needed- not met  4. Lower extremity exercise program x15 reps per  "handout, with independence- not met                         Subjective   Communicated with RN prior to session.  Patient found sitting edge of bed upon PT entry to room, agreeable to evaluation. Audrey Oneil Jr.'s significant other present during session.    Chief Complaint: debility   Patient/Family Comments/goals: to get better  Pain/Comfort:  Pain Rating 1: 0/10  Pain Rating Post-Intervention 1: 0/10    Objective:   Patient found with: telemetry, pulse ox (continuous), blood pressure cuff, central line, peripheral IV   General Precautions: Standard, Cardiac fall   Orthopedic Precautions:N/A   Braces: N/A   Oxygen Device: Room Air  Vitals: /65   Pulse 110   Temp 98.4 °F (36.9 °C) (Oral)   Resp (!) 34   Ht 5' 7" (1.702 m)   Wt 93.2 kg (205 lb 9.3 oz)   SpO2 96%   BMI 32.20 kg/m²     Outcome Measures:  AM-PAC 6 CLICK MOBILITY  Turning over in bed (including adjusting bedclothes, sheets and blankets)?: 3  Sitting down on and standing up from a chair with arms (e.g., wheelchair, bedside commode, etc.): 3  Moving from lying on back to sitting on the side of the bed?: 3  Moving to and from a bed to a chair (including a wheelchair)?: 3  Need to walk in hospital room?: 3  Climbing 3-5 steps with a railing?: 2  Basic Mobility Total Score: 17    Functional Mobility:  Additional staff present: OT - due to pt requiring 2 skilled therapists to safely perform functional mobility and to accommodate pt's activity tolerance; recent gout flare     Bed Mobility:  Not performed 2nd to pt found sitting EOB    Transfers:   Sit <> Stand Transfer: stand by assistance with no assistive device from EOB x 3 trials       Gait:  Patient ambulated: 10ft + 10ft + 150ft    Standing ADLs at sink between trials 1 and 2   Seated rest break between trials 2 and 3   Patient required: contact guard  Patient used: hand-held assist  Gait Pattern observed: reciprocal gait  Gait Deviation(s): occasional unsteady gait, decreased step " length, narrow base of support, and decreased nidia  Impairments due to: impaired balance and decreased endurance  Comments:   Mask donned; portable monitor intact and RN present  External stability provided by HHA  Verbal cuing for pacing and purposeful steps   No overt LOB but pt generally unsteady  Mild progressing to moderate SOB  VSS      Therapeutic Activities, Exercises, and Education:   Educated pt on PT role/POC  Educated pt on importance of OOB activity and daily ambulation   Pt verbalized understanding     Pt performed standing ADLs at sink with OT    T/f to chair to increase tolerance to OOB activity and to create optimal positioning for lung expansion     Patient left up in chair with all lines intact, call button in reach, RN notified, and significant other present..    Time Tracking:     PT Received On: 10/06/22  PT Start Time: 0948     PT Stop Time: 1011  PT Total Time (min): 23 min       Billable Minutes:   Gait Training 23    Treatment Type: Treatment  PT/PTA: PT

## 2022-10-06 NOTE — PROGRESS NOTES
[facility] - [facility]  Advanced Endoscopy  Progress Note    Patient Name: Audrey Oneil Jr.  MRN: [id number]  Admission Date: 9/14/2022  Hospital Length of Stay: 22 days  Code Status: Full Code   Attending Provider: Edison Marshall MD  Consulting Provider: Nelida Joe NP  Primary Care Physician: Primary Doctor No  Principal Problem: Acute on chronic combined systolic and diastolic heart failure      Subjective:     Interval History: Audrey Oneil Jr. Is a 72 y.o. male 2 days s/p EUS with FNA of pancreas cysts. Endoscopic appearance of cystic lesions was suspicious for IPMN. Cytology was negative for malignancy.  Upon assessment, Mr. Oneil reports midepigastric pain, worse with deep palpation. He additionally complains of multiple episodes of non bloody diarrhea. VS notable for tachycardia and tachypnea.     Review of Systems   Constitutional:  Negative for chills, diaphoresis and fever.   Eyes:  Negative for discharge and redness.   Gastrointestinal:  Positive for abdominal distention and diarrhea. Negative for abdominal pain, blood in stool, constipation, nausea and vomiting.   Skin:  Negative for color change.   Neurological:  Negative for syncope and speech difficulty.   Objective:     Vital Signs (Most Recent):  Temp: 98.4 °F (36.9 °C) (10/06/22 0800)  Pulse: 110 (10/06/22 1000)  Resp: (!) 34 (10/06/22 1000)  BP: 139/65 (10/06/22 1000)  SpO2: 96 % (10/06/22 1000)   Vital Signs (24h Range):  Temp:  [98 °F (36.7 °C)-98.4 °F (36.9 °C)] 98.4 °F (36.9 °C)  Pulse:  [] 110  Resp:  [15-46] 34  SpO2:  [85 %-100 %] 96 %  BP: ()/(50-80) 139/65     Weight: 93.2 kg (205 lb 9.3 oz) (10/06/22 0400)  Body mass index is 32.2 kg/m².      Intake/Output Summary (Last 24 hours) at 10/6/2022 1125  Last data filed at 10/6/2022 0900  Gross per 24 hour   Intake 1626.58 ml   Output 535 ml   Net 1091.58 ml       Lines/Drains/Airways       Central Venous Catheter Line  Duration             Percutaneous  Central Line Insertion/Assessment - Triple Lumen  09/27/22 0745 left internal jugular 9 days              Peripheral Intravenous Line  Duration                  Peripheral IV - Single Lumen 10/06/22 0430 20 G Posterior;Right Forearm <1 day                    Physical Exam  Vitals and nursing note reviewed.   Constitutional:       General: He is not in acute distress.     Appearance: He is obese. He is not diaphoretic.   Eyes:      General: No scleral icterus.  Abdominal:      General: Abdomen is protuberant. There is no distension.      Palpations: Abdomen is soft.      Tenderness: There is abdominal tenderness in the epigastric area. There is no guarding or rebound.   Skin:     General: Skin is warm and dry.      Coloration: Skin is not jaundiced.   Neurological:      Mental Status: He is alert and oriented to person, place, and time.       Significant Labs:  BMP:   Recent Labs   Lab 10/06/22  0425   *      K 4.2      CO2 25   BUN 38*   CREATININE 1.5*   CALCIUM 8.9   MG 2.2     CMP:   Recent Labs   Lab 10/06/22  0425   *   CALCIUM 8.9      K 4.2   CO2 25      BUN 38*   CREATININE 1.5*     Coagulation:   Recent Labs   Lab 10/06/22  0830   APTT 54.7*         Significant Imaging:  Imaging results within the past 24 hours have been reviewed.    Assessment/Plan:     Pancreatic lesion  71 yo male 2 days s/p EUS + FNA for cystic lesion of pancreas, suspicious for IPMN with cytology negative for malignancy.     Recommendations:  -Diet as tolerated  -Continue current medications  -Follow up in Advanced Endoscopy clinic in 6 months (April 2023) for pancreas cyst surveillance      Plan for 6 month surveillance discussed at bedside with  Thad. He was provided with [facility] contact information for any questions/concerns in the interim time before his follow up.    TOMMY Wiley  Advanced Endoscopy Nurse Practitioner  [facility]

## 2022-10-06 NOTE — PLAN OF CARE
Cardiac ICU Care Plan    POC reviewed with Audrey Oneil Jr.  See below and flowsheets for full assessment and VS info.     Neuro:  [place] Coma Scale  Best Eye Response: 4-->(E4) spontaneous  Best Motor Response: 6-->(M6) obeys commands  Best Verbal Response: 5-->(V5) oriented  [place] Coma Scale Score: 15  Assessment Qualifiers: patient not sedated/intubated  Pupil PERRLA: yes    24 hr Temp:  [97.7 °F (36.5 °C)-98.4 °F (36.9 °C)]      CV:  Rhythm: normal sinus rhythm   DVT prophylaxis: VTE Required Core Measure: Pharmacological prophylaxis initiated/maintained    CVP (mean): 8 mmHg (10/05/22 1500)    [REMOVED] PICC Double Lumen 09/22/22 1507 right basilic-Current Insertion Depth (cm): 36 cm (09/22/22 1507)  SVO2 (%): (S) 61 % (10/03/22 1600)               Pulses  Right Radial Pulse: 2+ (normal)  Left Radial Pulse: 2+ (normal)  Right Dorsalis Pedis Pulse: 2+ (normal), palpation  Left Dorsalis Pedis Pulse: 2+ (normal), palpation  Right Posterior Tibial Pulse: 1+ (weak), palpation  Left Posterior Tibial Pulse: 1+ (weak), palpation    Resp:  O2 Device (Oxygen Therapy): nasal cannula w/ humidification  Flow (L/min): 3  Vent Mode: Spont  Set Rate: 20 BPM  Oxygen Concentration (%): 28  Vt Set: 500 mL  PEEP/CPAP: 5 cmH20  Pressure Support: 4 cmH20    GI/:  GI prophylaxis: yes  Diet/Nutrition Received: 2 gram sodium, low saturated fat/low cholesterol  Last Bowel Movement: 10/05/22  Voiding Characteristics: voids spontaneously without difficulty  [REMOVED]      Urethral Catheter 09/29/22 1600-Reason for Continuing Urinary Catheterization: Urinary retention, Critically ill in ICU and requiring hourly monitoring of intake/output   Intake/Output Summary (Last 24 hours) at 10/5/2022 2058  Last data filed at 10/5/2022 1400  Gross per 24 hour   Intake 666.1 ml   Output 410 ml   Net 256.1 ml       Labs/Accuchecks:  Recent Labs   Lab 10/03/22  0217 10/04/22  0354 10/05/22  0417   WBC 6.66 5.60 9.11   RBC 3.32* 3.19* 3.32*   HGB  9.2* 8.8* 9.1*   HCT 29.1* 27.7* 28.9*    270 320      Recent Labs   Lab 10/01/22  1547 10/01/22  2246 10/04/22  0354 10/05/22  0417 10/05/22  1646   INR 1.0  --   --   --   --    APTT 28.7   < > 41.9*  41.9* 40.2* 31.6    < > = values in this interval not displayed.      Recent Labs     10/05/22  0417      K 3.9   CO2 23      BUN 35*   CREATININE 1.7*       Recent Labs   Lab 09/30/22  0421   CPK 60      Recent Labs     10/04/22  0427 10/05/22  0416 10/05/22  1625   PH 7.344* 7.377 7.378   PCO2 53.5* 46.1* 45.7*   PO2 29* 28* 32*   HCO3 29.2* 27.1 26.9   POCSATURATED 51* 51* 61*   BE 3 2 2       Electrolytes: N/A - electrolytes WDL  Accuchecks: none    Gtts/LDAs:   sodium chloride 0.9% Stopped (09/28/22 1550)    sodium chloride 0.9% 5 mL/hr at 10/05/22 1101    DOBUTamine IV infusion (non-titrating) 2.5 mcg/kg/min (10/05/22 1400)    heparin (porcine) in D5W 21 Units/kg/hr (10/05/22 1938)       Lines/Drains/Airways       Central Venous Catheter Line  Duration             Percutaneous Central Line Insertion/Assessment - Triple Lumen  09/27/22 0745 left internal jugular 8 days              Peripheral Intravenous Line  Duration                  Peripheral IV - Single Lumen 10/01/22 0000 20 G Anterior;Right Shoulder 4 days                    Skin/Wounds  Bathing/Skin Care: back care;bath, complete;dressed/undressed;incontinence care;linen changed (10/04/22 0300)

## 2022-10-06 NOTE — PROGRESS NOTES
Pt AAAO, no family at bedside.  Talked with pt regarding VAD and provided written education.  He was very interactive and told me about his girlfriend and that she doesn't read.  He told me he is having trouble with his eyes right now and will try to read the material tonight.  No questions for me at this time.

## 2022-10-06 NOTE — PLAN OF CARE
Problem: Occupational Therapy  Goal: Occupational Therapy Goal  Description: Goals to be met by: 10/13/22     Patient will increase functional independence with ADLs by performing:    UE Dressing with Supervision.  LE Dressing with Supervision.  Grooming while standing at sink with Supervision.  Toileting from toilet with Supervision for hygiene and clothing management.   Toilet transfer to toilet with Supervision.    Outcome: Ongoing, Progressing

## 2022-10-07 NOTE — SUBJECTIVE & OBJECTIVE
Interval History: VIDA. Had abdominal discomfort with 4 episodes of loose stools last night . On Dobutmine 2.5( baseline) and off pressors since 10/2. stepdown to floor 10/6/22.       Continuous Infusions:   sodium chloride 0.9% Stopped (09/28/22 1550)    sodium chloride 0.9% 5 mL/hr at 10/06/22 0105    DOBUTamine IV infusion (non-titrating) 2.5 mcg/kg/min (10/07/22 0200)    heparin (porcine) in D5W 19 Units/kg/hr (10/07/22 0405)     Scheduled Meds:   acetaminophen  650 mg Oral QID    allopurinoL  200 mg Oral Daily    amiodarone  300 mg Oral Daily    aspirin  81 mg Oral Daily    atorvastatin  80 mg Oral Daily    DAPTOmycin (CUBICIN) IV (PEDS and ADULTS)  10 mg/kg Intravenous Q24H    famotidine  20 mg Oral Daily    LIDOcaine  1 patch Transdermal Q24H    LIDOcaine HCl 2%  15 mL Oral Once    polyethylene glycol  17 g Oral Daily    potassium chloride  40 mEq Oral Once    sucralfate  1 g Oral BID     PRN Meds:sodium chloride, acetaminophen, artificial tears, dextromethorphan-guaiFENesin  mg, heparin (PORCINE), heparin (PORCINE), HYDROcodone-acetaminophen, melatonin, methocarbamoL, ondansetron, senna-docusate 8.6-50 mg, sodium chloride 0.9%    Review of patient's allergies indicates:   Allergen Reactions    Iodine containing multivitamin Swelling     itching    Keflex [cephalexin] Swelling     Eyes.  Tolerated multiple doses of zosyn and 1 dose of augmentin in 2015 and 2016, respectively    Peaches [peach (prunus persica)] Swelling     eyes    Shellfish containing products Swelling    Fig tree Swelling     itching    Tuberculin spenser test ppd Rash     Objective:     Vital Signs (Most Recent):  Temp: 97.3 °F (36.3 °C) (10/07/22 0823)  Pulse: 96 (10/07/22 0728)  Resp: 16 (10/07/22 0728)  BP: (!) 111/58 (10/07/22 0728)  SpO2: 96 % (10/07/22 0728)   Vital Signs (24h Range):  Temp:  [96.5 °F (35.8 °C)-98.5 °F (36.9 °C)] 97.3 °F (36.3 °C)  Pulse:  [] 96  Resp:  [14-47] 16  SpO2:  [92 %-96 %] 96 %  BP:  (105-139)/(53-69) 111/58     Patient Vitals for the past 72 hrs (Last 3 readings):   Weight   10/06/22 0400 93.2 kg (205 lb 9.3 oz)       Body mass index is 32.2 kg/m².      Intake/Output Summary (Last 24 hours) at 10/7/2022 0955  Last data filed at 10/7/2022 0200  Gross per 24 hour   Intake 1251.4 ml   Output 660 ml   Net 591.4 ml           Physical Exam  Constitutional:       General: He is not in acute distress.     Appearance: Normal appearance. He is normal weight. He is not ill-appearing or toxic-appearing.   HENT:      Head: Normocephalic and atraumatic.      Nose: Nose normal. No congestion.      Mouth/Throat:      Mouth: Mucous membranes are moist.      Pharynx: No oropharyngeal exudate.   Eyes:      General:         Right eye: No discharge.         Left eye: No discharge.      Extraocular Movements: Extraocular movements intact.      Pupils: Pupils are equal, round, and reactive to light.   Cardiovascular:      Rate and Rhythm: Normal rate and regular rhythm.      Heart sounds: No murmur heard.    No friction rub. No gallop.   Pulmonary:      Effort: Pulmonary effort is normal. No respiratory distress.      Breath sounds: Normal breath sounds. No wheezing, rhonchi or rales.   Abdominal:      General: Abdomen is flat. Bowel sounds are normal. There is no distension.      Palpations: Abdomen is soft.      Tenderness: There is no abdominal tenderness.   Musculoskeletal:         General: No swelling. Normal range of motion.      Cervical back: Normal range of motion. No rigidity.      Right lower leg: No edema.      Left lower leg: No edema.   Skin:     General: Skin is warm and dry.      Findings: No lesion or rash.   Neurological:      General: No focal deficit present.      Mental Status: He is alert and oriented to person, place, and time.      Cranial Nerves: No cranial nerve deficit.      Motor: No weakness.       Significant Labs:  CBC:  Recent Labs   Lab 10/05/22  0417 10/06/22  0425 10/07/22  2377    WBC 9.11 7.07 7.53   RBC 3.32* 3.21* 3.17*   HGB 9.1* 8.9* 8.8*   HCT 28.9* 28.3* 27.7*    306 328   MCV 87 88 87   MCH 27.4 27.7 27.8   MCHC 31.5* 31.4* 31.8*       BNP:  No results for input(s): BNP in the last 168 hours.    Invalid input(s): BNPTRIAGELBLO  CMP:  Recent Labs   Lab 10/01/22  1139 10/01/22  2246 10/02/22  0351 10/03/22  0506 10/05/22  0417 10/06/22  0425 10/07/22  0358    141* 105   < > 108 142* 101   CALCIUM 8.8 8.9 8.5*   < > 9.3 8.9 8.9   ALBUMIN 3.0* 3.1* 2.9*  --   --   --   --    PROT 6.5 6.7 6.5  --   --   --   --    * 135* 135*   < > 136 141 140   K 3.2* 3.8 3.7   < > 3.9 4.2 4.0   CO2 19* 21* 22*   < > 23 25 22*   CL 98 99 98   < > 103 106 105   BUN 33* 31* 29*   < > 35* 38* 36*   CREATININE 2.0* 2.1* 2.0*   < > 1.7* 1.5* 1.5*   ALKPHOS 80 81 83  --   --   --   --    ALT 13 13 13  --   --   --   --    AST 13 15 15  --   --   --   --    BILITOT 0.9 0.8 0.8  --   --   --   --     < > = values in this interval not displayed.        Coagulation:   Recent Labs   Lab 10/01/22  1547 10/01/22  2246 10/06/22  0830 10/06/22  1449 10/07/22  0358   INR 1.0  --   --   --   --    APTT 28.7   < > 54.7* 56.8* 65.1*    < > = values in this interval not displayed.       LDH:  No results for input(s): LDH in the last 72 hours.  Microbiology:  Microbiology Results (last 7 days)       Procedure Component Value Units Date/Time    Blood culture [[id number]] Collected: 09/30/22 0241    Order Status: Completed Specimen: Blood from Peripheral, Hand, Right Updated: 10/05/22 0612     Blood Culture, Routine No growth after 5 days.    Blood culture [[id number]] Collected: 09/30/22 0243    Order Status: Completed Specimen: Blood from Peripheral, Antecubital, Right Updated: 10/05/22 0612     Blood Culture, Routine No growth after 5 days.    Culture, Anaerobe [[id number]] Collected: 09/27/22 1026    Order Status: Completed Specimen: Abscess from Heart Updated: 10/04/22 0933     Anaerobic Culture No  anaerobes isolated    Narrative:      LV Lead Tip    Culture, Anaerobe [[id number]] Collected: 09/27/22 1133    Order Status: Completed Specimen: Abscess from Heart Updated: 10/04/22 0933     Anaerobic Culture No anaerobes isolated    Narrative:      RA Lead Tip    Culture, Anaerobe [[id number]] Collected: 09/27/22 1032    Order Status: Completed Specimen: Abscess from Heart Updated: 10/04/22 0932     Anaerobic Culture No anaerobes isolated    Narrative:      RA Lead drainage #1    Culture, Anaerobe [[id number]] Collected: 09/27/22 1035    Order Status: Completed Specimen: Abscess from Heart Updated: 10/04/22 0932     Anaerobic Culture No anaerobes isolated    Narrative:      RA Lead drainage #2    Culture, Anaerobe [[id number]] Collected: 09/27/22 0947    Order Status: Completed Specimen: Abscess from Chest, Left Updated: 10/04/22 0932     Anaerobic Culture No anaerobes isolated    Narrative:      Deep Pocket #1    Culture, Anaerobe [[id number]] Collected: 09/27/22 0952    Order Status: Completed Specimen: Abscess from Chest, Left Updated: 10/04/22 0932     Anaerobic Culture No anaerobes isolated    Narrative:      Deep Pocket #2    Culture, Anaerobe [[id number]] Collected: 09/27/22 0936    Order Status: Completed Specimen: Abscess from Chest, Left Updated: 10/04/22 0932     Anaerobic Culture No anaerobes isolated    Narrative:      Shallow Pocket    Culture, Anaerobe [[id number]] Collected: 09/27/22 1138    Order Status: Completed Specimen: Abscess from Heart Updated: 10/03/22 1051     Anaerobic Culture No anaerobes isolated    Narrative:      RV Lead Tip    Culture, Respiratory with Gram Stain [[id number]] Collected: 10/01/22 1041    Order Status: Completed Specimen: Respiratory from Endotracheal Aspirate Updated: 10/03/22 1043     Respiratory Culture Normal respiratory jadiel      No S aureus or Pseudomonas isolated.     Gram Stain (Respiratory) <10 epithelial cells per low power field.     Gram Stain (Respiratory) Rare  WBC's     Gram Stain (Respiratory) No organisms seen    Aerobic culture [[id number]] Collected: 09/27/22 1133    Order Status: Completed Specimen: Abscess from Heart Updated: 09/30/22 1153     Aerobic Bacterial Culture No growth    Narrative:      RA Lead Tip    Aerobic culture [[id number]] Collected: 09/27/22 0952    Order Status: Completed Specimen: Abscess from Chest, Left Updated: 09/30/22 1153     Aerobic Bacterial Culture No growth    Narrative:      Deep Pocket #2    Aerobic culture [[id number]] Collected: 09/27/22 1032    Order Status: Completed Specimen: Abscess from Heart Updated: 09/30/22 1153     Aerobic Bacterial Culture No growth    Narrative:      RA Lead drainage #1    Aerobic culture [[id number]] Collected: 09/27/22 0947    Order Status: Completed Specimen: Abscess from Chest, Left Updated: 09/30/22 1153     Aerobic Bacterial Culture No growth    Narrative:      Deep Pocket #1    Aerobic culture [[id number]] Collected: 09/27/22 1026    Order Status: Completed Specimen: Abscess from Heart Updated: 09/30/22 1153     Aerobic Bacterial Culture No growth    Narrative:      LV Lead Tip    Aerobic culture [[id number]] Collected: 09/27/22 1035    Order Status: Completed Specimen: Abscess from Heart Updated: 09/30/22 1153     Aerobic Bacterial Culture No growth    Narrative:      RA Lead drainage #2    Aerobic culture [[id number]] Collected: 09/27/22 1138    Order Status: Completed Specimen: Abscess from Heart Updated: 09/30/22 1153     Aerobic Bacterial Culture No growth    Narrative:      RV Lead Tip    Aerobic culture [[id number]] Collected: 09/27/22 0936    Order Status: Completed Specimen: Abscess from Chest, Left Updated: 09/30/22 1153     Aerobic Bacterial Culture No growth    Narrative:      Shallow pocket            BMP:   Recent Labs   Lab 10/07/22  0358         K 4.0      CO2 22*   BUN 36*   CREATININE 1.5*   CALCIUM 8.9   MG 2.0       I have reviewed all pertinent labs within the past  24 hours.    Estimated Creatinine Clearance: 49.9 mL/min (A) (based on SCr of 1.5 mg/dL (H)).    Diagnostic Results:  Reviewed

## 2022-10-07 NOTE — PROGRESS NOTES
Update    Pt presents as AAO x4, calm, cooperative, and asking and answering questions appropriately, caregivers not present. Pt states he has been doing well but still concerned about his stomach. Pt states all the potential side effects from the LVAD are scary but he still would like to move forward with it. He states the LVAD RN spoke to him today about it and will return on Monday. LCSW and Pt processed his emotions around his medical issues and knowing that his person is not apart of the disease process, Pt states he has tried to live as healthy of a life as he could up to this point. Pt states he may be discharged early next week. LCSW offered support and encouragement. SW providing ongoing psychosocial, counseling, & emotional support, education, resources, assistance, and discharge planning as indicated.  SW to continue to follow.

## 2022-10-07 NOTE — PROGRESS NOTES
Pt AAAO, no family at bedside.  Pt reports his eyes are still bad so he wasn't able to read the material, they have been bad for a week now.  He is hoping the drops help.  When I talked with him about VAD he told me he is getting it and I explained not until he is educated about it.  His response was he doesn't need to know anything about it, just to keep it clean.  We talked about this for a few minutes and talked about complications.  He explained being on HD is not a big deal, he was already on it after a previous surgery.  Will follow up with him again Monday.

## 2022-10-07 NOTE — PROGRESS NOTES
[facility] - Cardiology Stepdown  Heart Transplant  Progress Note    Patient Name: Audrey Oneil Jr.  MRN: [id number]  Admission Date: 9/14/2022  Hospital Length of Stay: 23 days  Attending Physician: Edison Marshall MD  Primary Care Provider: Primary Doctor No  Principal Problem:Acute on chronic combined systolic and diastolic heart failure    Subjective:     Interval History: NAEON. Had abdominal discomfort with 4 episodes of loose stools last night . On Dobutmine 2.5( baseline) and off pressors since 10/2. stepdown to floor 10/6/22.       Continuous Infusions:   sodium chloride 0.9% Stopped (09/28/22 1550)    sodium chloride 0.9% 5 mL/hr at 10/06/22 0105    DOBUTamine IV infusion (non-titrating) 2.5 mcg/kg/min (10/07/22 0200)    heparin (porcine) in D5W 19 Units/kg/hr (10/07/22 0405)     Scheduled Meds:   acetaminophen  650 mg Oral QID    allopurinoL  200 mg Oral Daily    amiodarone  300 mg Oral Daily    aspirin  81 mg Oral Daily    atorvastatin  80 mg Oral Daily    DAPTOmycin (CUBICIN) IV (PEDS and ADULTS)  10 mg/kg Intravenous Q24H    famotidine  20 mg Oral Daily    LIDOcaine  1 patch Transdermal Q24H    LIDOcaine HCl 2%  15 mL Oral Once    polyethylene glycol  17 g Oral Daily    potassium chloride  40 mEq Oral Once    sucralfate  1 g Oral BID     PRN Meds:sodium chloride, acetaminophen, artificial tears, dextromethorphan-guaiFENesin  mg, heparin (PORCINE), heparin (PORCINE), HYDROcodone-acetaminophen, melatonin, methocarbamoL, ondansetron, senna-docusate 8.6-50 mg, sodium chloride 0.9%    Review of patient's allergies indicates:   Allergen Reactions    Iodine containing multivitamin Swelling     itching    Keflex [cephalexin] Swelling     Eyes.  Tolerated multiple doses of zosyn and 1 dose of augmentin in 2015 and 2016, respectively    Peaches [peach (prunus persica)] Swelling     eyes    Shellfish containing products Swelling    Fig tree Swelling     itching    Tuberculin spenser test ppd  Rash     Objective:     Vital Signs (Most Recent):  Temp: 97.3 °F (36.3 °C) (10/07/22 0823)  Pulse: 96 (10/07/22 0728)  Resp: 16 (10/07/22 0728)  BP: (!) 111/58 (10/07/22 0728)  SpO2: 96 % (10/07/22 0728)   Vital Signs (24h Range):  Temp:  [96.5 °F (35.8 °C)-98.5 °F (36.9 °C)] 97.3 °F (36.3 °C)  Pulse:  [] 96  Resp:  [14-47] 16  SpO2:  [92 %-96 %] 96 %  BP: (105-139)/(53-69) 111/58     Patient Vitals for the past 72 hrs (Last 3 readings):   Weight   10/06/22 0400 93.2 kg (205 lb 9.3 oz)       Body mass index is 32.2 kg/m².      Intake/Output Summary (Last 24 hours) at 10/7/2022 0955  Last data filed at 10/7/2022 0200  Gross per 24 hour   Intake 1251.4 ml   Output 660 ml   Net 591.4 ml           Physical Exam  Constitutional:       General: He is not in acute distress.     Appearance: Normal appearance. He is normal weight. He is not ill-appearing or toxic-appearing.   HENT:      Head: Normocephalic and atraumatic.      Nose: Nose normal. No congestion.      Mouth/Throat:      Mouth: Mucous membranes are moist.      Pharynx: No oropharyngeal exudate.   Eyes:      General:         Right eye: No discharge.         Left eye: No discharge.      Extraocular Movements: Extraocular movements intact.      Pupils: Pupils are equal, round, and reactive to light.   Cardiovascular:      Rate and Rhythm: Normal rate and regular rhythm.      Heart sounds: No murmur heard.    No friction rub. No gallop.   Pulmonary:      Effort: Pulmonary effort is normal. No respiratory distress.      Breath sounds: Normal breath sounds. No wheezing, rhonchi or rales.   Abdominal:      General: Abdomen is flat. Bowel sounds are normal. There is no distension.      Palpations: Abdomen is soft.      Tenderness: There is no abdominal tenderness.   Musculoskeletal:         General: No swelling. Normal range of motion.      Cervical back: Normal range of motion. No rigidity.      Right lower leg: No edema.      Left lower leg: No edema.   Skin:      General: Skin is warm and dry.      Findings: No lesion or rash.   Neurological:      General: No focal deficit present.      Mental Status: He is alert and oriented to person, place, and time.      Cranial Nerves: No cranial nerve deficit.      Motor: No weakness.       Significant Labs:  CBC:  Recent Labs   Lab 10/05/22  0417 10/06/22  0425 10/07/22  0358   WBC 9.11 7.07 7.53   RBC 3.32* 3.21* 3.17*   HGB 9.1* 8.9* 8.8*   HCT 28.9* 28.3* 27.7*    306 328   MCV 87 88 87   MCH 27.4 27.7 27.8   MCHC 31.5* 31.4* 31.8*       BNP:  No results for input(s): BNP in the last 168 hours.    Invalid input(s): BNPTRIAGELBLO  CMP:  Recent Labs   Lab 10/01/22  1139 10/01/22  2246 10/02/22  0351 10/03/22  0506 10/05/22  0417 10/06/22  0425 10/07/22  0358    141* 105   < > 108 142* 101   CALCIUM 8.8 8.9 8.5*   < > 9.3 8.9 8.9   ALBUMIN 3.0* 3.1* 2.9*  --   --   --   --    PROT 6.5 6.7 6.5  --   --   --   --    * 135* 135*   < > 136 141 140   K 3.2* 3.8 3.7   < > 3.9 4.2 4.0   CO2 19* 21* 22*   < > 23 25 22*   CL 98 99 98   < > 103 106 105   BUN 33* 31* 29*   < > 35* 38* 36*   CREATININE 2.0* 2.1* 2.0*   < > 1.7* 1.5* 1.5*   ALKPHOS 80 81 83  --   --   --   --    ALT 13 13 13  --   --   --   --    AST 13 15 15  --   --   --   --    BILITOT 0.9 0.8 0.8  --   --   --   --     < > = values in this interval not displayed.        Coagulation:   Recent Labs   Lab 10/01/22  1547 10/01/22  2246 10/06/22  0830 10/06/22  1449 10/07/22  0358   INR 1.0  --   --   --   --    APTT 28.7   < > 54.7* 56.8* 65.1*    < > = values in this interval not displayed.       LDH:  No results for input(s): LDH in the last 72 hours.  Microbiology:  Microbiology Results (last 7 days)       Procedure Component Value Units Date/Time    Blood culture [[id number]] Collected: 09/30/22 0241    Order Status: Completed Specimen: Blood from Peripheral, Hand, Right Updated: 10/05/22 0612     Blood Culture, Routine No growth after 5 days.    Blood  culture [[id number]] Collected: 09/30/22 0243    Order Status: Completed Specimen: Blood from Peripheral, Antecubital, Right Updated: 10/05/22 0612     Blood Culture, Routine No growth after 5 days.    Culture, Anaerobe [[id number]] Collected: 09/27/22 1026    Order Status: Completed Specimen: Abscess from Heart Updated: 10/04/22 0933     Anaerobic Culture No anaerobes isolated    Narrative:      LV Lead Tip    Culture, Anaerobe [[id number]] Collected: 09/27/22 1133    Order Status: Completed Specimen: Abscess from Heart Updated: 10/04/22 0933     Anaerobic Culture No anaerobes isolated    Narrative:      RA Lead Tip    Culture, Anaerobe [[id number]] Collected: 09/27/22 1032    Order Status: Completed Specimen: Abscess from Heart Updated: 10/04/22 0932     Anaerobic Culture No anaerobes isolated    Narrative:      RA Lead drainage #1    Culture, Anaerobe [[id number]] Collected: 09/27/22 1035    Order Status: Completed Specimen: Abscess from Heart Updated: 10/04/22 0932     Anaerobic Culture No anaerobes isolated    Narrative:      RA Lead drainage #2    Culture, Anaerobe [[id number]] Collected: 09/27/22 0947    Order Status: Completed Specimen: Abscess from Chest, Left Updated: 10/04/22 0932     Anaerobic Culture No anaerobes isolated    Narrative:      Deep Pocket #1    Culture, Anaerobe [[id number]] Collected: 09/27/22 0952    Order Status: Completed Specimen: Abscess from Chest, Left Updated: 10/04/22 0932     Anaerobic Culture No anaerobes isolated    Narrative:      Deep Pocket #2    Culture, Anaerobe [[id number]] Collected: 09/27/22 0936    Order Status: Completed Specimen: Abscess from Chest, Left Updated: 10/04/22 0932     Anaerobic Culture No anaerobes isolated    Narrative:      Shallow Pocket    Culture, Anaerobe [[id number]] Collected: 09/27/22 1138    Order Status: Completed Specimen: Abscess from Heart Updated: 10/03/22 1051     Anaerobic Culture No anaerobes isolated    Narrative:      RV Lead Tip    Culture,  Respiratory with Gram Stain [[id number]] Collected: 10/01/22 1041    Order Status: Completed Specimen: Respiratory from Endotracheal Aspirate Updated: 10/03/22 1043     Respiratory Culture Normal respiratory jadiel      No S aureus or Pseudomonas isolated.     Gram Stain (Respiratory) <10 epithelial cells per low power field.     Gram Stain (Respiratory) Rare WBC's     Gram Stain (Respiratory) No organisms seen    Aerobic culture [[id number]] Collected: 09/27/22 1133    Order Status: Completed Specimen: Abscess from Heart Updated: 09/30/22 1153     Aerobic Bacterial Culture No growth    Narrative:      RA Lead Tip    Aerobic culture [[id number]] Collected: 09/27/22 0952    Order Status: Completed Specimen: Abscess from Chest, Left Updated: 09/30/22 1153     Aerobic Bacterial Culture No growth    Narrative:      Deep Pocket #2    Aerobic culture [[id number]] Collected: 09/27/22 1032    Order Status: Completed Specimen: Abscess from Heart Updated: 09/30/22 1153     Aerobic Bacterial Culture No growth    Narrative:      RA Lead drainage #1    Aerobic culture [[id number]] Collected: 09/27/22 0947    Order Status: Completed Specimen: Abscess from Chest, Left Updated: 09/30/22 1153     Aerobic Bacterial Culture No growth    Narrative:      Deep Pocket #1    Aerobic culture [[id number]] Collected: 09/27/22 1026    Order Status: Completed Specimen: Abscess from Heart Updated: 09/30/22 1153     Aerobic Bacterial Culture No growth    Narrative:      LV Lead Tip    Aerobic culture [[id number]] Collected: 09/27/22 1035    Order Status: Completed Specimen: Abscess from Heart Updated: 09/30/22 1153     Aerobic Bacterial Culture No growth    Narrative:      RA Lead drainage #2    Aerobic culture [[id number]] Collected: 09/27/22 1138    Order Status: Completed Specimen: Abscess from Heart Updated: 09/30/22 1153     Aerobic Bacterial Culture No growth    Narrative:      RV Lead Tip    Aerobic culture [[id number]] Collected: 09/27/22 0936     Order Status: Completed Specimen: Abscess from Chest, Left Updated: 09/30/22 1153     Aerobic Bacterial Culture No growth    Narrative:      Shallow pocket            BMP:   Recent Labs   Lab 10/07/22  0358         K 4.0      CO2 22*   BUN 36*   CREATININE 1.5*   CALCIUM 8.9   MG 2.0       I have reviewed all pertinent labs within the past 24 hours.    Estimated Creatinine Clearance: 49.9 mL/min (A) (based on SCr of 1.5 mg/dL (H)).    Diagnostic Results:  Reviewed     Assessment and Plan:   71 yo WM being worked up for LVAD since hospitalization January 2022 for recurrent VT (he thinks had MI) and cardiogenic shock at [facility]. He was  later seen in [facility] where he was treated for cardiogenic shock and sent home on .  He remains on  and is pursuing evaluation for LVAD.     His case was discussed at selection committee and he was deferred pending evaluation of pancreatic mass.  They wish to proceed with MRI but not sure with his ICD if this will be possible.Presented with MRSA bacteremia on admission.   HARRY 9/21/22: Not concerning for Vegetations. BCx negative 9/18.      9/27 CRT generator and lead extraction, pocket cx => hypotensive post-procedure requiring Epi, ICU  9/28 off Epi, transfer to CSU  9/29 sudden hypoxemic respiratory failure requiring urgent intubation after sats dropped while laying flat for PICC line => tx ICU, panculture       Acute Hypoxic respiratory failure 9/29/22 -resolved  -suspect aspiration event. Completed 5 day course with zosyn 10/4/22  -intubated and sedated 9/29, extubated 10/1/22  -Saturating well on room air.   -step down to floor 10/6.     * Chronic combined systolic and diastolic congestive heart failure  - On Dobutamine 2.5 which is his baseline and off pressors since 10/2  ,MAPS >60.   - off diuretics currently. Cr improved to 1.5( baseline 1.4-1.7)    - Daily weights, Strict I/Os  - Monitor electrolytes and Maintain Mg >2 and K >4  -Telemetry  monitoring     Pancreatic lesion  - Gastroenterology on board  - EUS with biopsy  10/4, cytology results -negative for malignant cells, c/w cyst contents.      Bacteremia due to methicillin resistant Staphylococcus aureus  - Monitor WBC count and fever curve, pan-culture if patient spikes  - ID consult and recommendations are appreciated  - On Dapto currently end date 11/11 per ID ( 6 wks after device removal 9/27) for the MRSA bacteremia 9/14, 9/15, 9/16 positive for MRSA. 9/18 NG  - completed 5day  Course of zosyn 10/4/22 for aspiration pneumonia.   -9/30 BCX negative , resp cultures 10/1 NG     H/O ventricular tachycardia  - Amiodarone 300 mg, daily  - Monitor LFTs.         Coronary artery disease involving native coronary artery of native heart without angina pectoris  - Asprin 81 mg, daily  - Atorvastatin 80 mg, nightl     Left Brachial Vein Thrombosis 9/21  - on Heparin . Will consider transitioning to either eliquis or coumadin after getting to know his long term plan for [facility].     Acute on Chronic Gout  -resolved  - pred 30 mg . Completed 5 day course 10/6.  -On allopurinol 200 mg            Benton Rendon MD  Heart Transplant  [facility] - Cardiology Stepdown

## 2022-10-07 NOTE — NURSING
PT A&O x4. On RA. PT refuses to wear lifevest and removes it and placed it back into the box that it came with, PT states that he was not made to wear it in ICU and he will wear it once he goes home. PT is on telemetry. PT is ambulatory and has continuous dobutamine and heparin. CVP taken this morning was 15. PT has back pain that is currently controlled with PRN of hydrocodone. Urinal at the bedside for nursing to capture output. Will continue to monitor and assess.

## 2022-10-08 NOTE — PROGRESS NOTES
[facility] - Cardiology Stepdown  Heart Transplant  Progress Note    Patient Name: Audrey Oneil Jr.  MRN: [id number]  Admission Date: 9/14/2022  Hospital Length of Stay: 24 days  Attending Physician: Edison Marshall MD  Primary Care Provider: Primary Doctor No  Principal Problem:Acute on chronic combined systolic and diastolic heart failure    Subjective:     Interval History: NAEON. CVP 12 this am . Resumed Po lasix 40 BID.  On Dobutmine 2.5( baseline) and off pressors since 10/2. stepdown to floor 10/6/22.         Continuous Infusions:   sodium chloride 0.9% Stopped (09/28/22 1550)    sodium chloride 0.9% 5 mL/hr at 10/06/22 0105    DOBUTamine IV infusion (non-titrating) 2.5 mcg/kg/min (10/07/22 1809)    heparin (porcine) in D5W 19 Units/kg/hr (10/08/22 0700)     Scheduled Meds:   acetaminophen  650 mg Oral QID    allopurinoL  200 mg Oral Daily    amiodarone  300 mg Oral Daily    aspirin  81 mg Oral Daily    atorvastatin  80 mg Oral Daily    DAPTOmycin (CUBICIN) IV (PEDS and ADULTS)  10 mg/kg Intravenous Q24H    famotidine  20 mg Oral Daily    furosemide  40 mg Oral BID    LIDOcaine  1 patch Transdermal Q24H    LIDOcaine HCl 2%  15 mL Oral Once    olopatadine  1 drop Both Eyes Daily    polyethylene glycol  17 g Oral Daily    potassium chloride  40 mEq Oral Once    sucralfate  1 g Oral BID     PRN Meds:sodium chloride, acetaminophen, artificial tears, dextromethorphan-guaiFENesin  mg, heparin (PORCINE), heparin (PORCINE), HYDROcodone-acetaminophen, melatonin, methocarbamoL, ondansetron, senna-docusate 8.6-50 mg, sodium chloride 0.9%    Review of patient's allergies indicates:   Allergen Reactions    Iodine containing multivitamin Swelling     itching    Keflex [cephalexin] Swelling     Eyes.  Tolerated multiple doses of zosyn and 1 dose of augmentin in 2015 and 2016, respectively    Peaches [peach (prunus persica)] Swelling     eyes    Shellfish containing products Swelling    Fig tree  Swelling     itching    Tuberculin spenser test ppd Rash     Objective:     Vital Signs (Most Recent):  Temp: 97.9 °F (36.6 °C) (10/08/22 0809)  Pulse: 97 (10/08/22 1000)  Resp: (!) 21 (10/08/22 0809)  BP: 129/61 (10/08/22 0809)  SpO2: 96 % (10/08/22 0809)   Vital Signs (24h Range):  Temp:  [97.8 °F (36.6 °C)-98 °F (36.7 °C)] 97.9 °F (36.6 °C)  Pulse:  [] 97  Resp:  [16-21] 21  SpO2:  [92 %-98 %] 96 %  BP: (111-129)/(61-70) 129/61     Patient Vitals for the past 72 hrs (Last 3 readings):   Weight   10/06/22 0400 93.2 kg (205 lb 9.3 oz)       Body mass index is 32.2 kg/m².      Intake/Output Summary (Last 24 hours) at 10/8/2022 1151  Last data filed at 10/8/2022 0833  Gross per 24 hour   Intake 906.1 ml   Output 980 ml   Net -73.9 ml           Physical Exam  Constitutional:       General: He is not in acute distress.     Appearance: Normal appearance. He is normal weight. He is not ill-appearing or toxic-appearing.   HENT:      Head: Normocephalic and atraumatic.      Nose: Nose normal. No congestion.      Mouth/Throat:      Mouth: Mucous membranes are moist.      Pharynx: No oropharyngeal exudate.   Eyes:      General:         Right eye: No discharge.         Left eye: No discharge.      Extraocular Movements: Extraocular movements intact.      Pupils: Pupils are equal, round, and reactive to light.   Cardiovascular:      Rate and Rhythm: Normal rate and regular rhythm.      Heart sounds: No murmur heard.    No friction rub. No gallop.   Pulmonary:      Effort: Pulmonary effort is normal. No respiratory distress.      Breath sounds: Normal breath sounds. No wheezing, rhonchi or rales.   Abdominal:      General: Abdomen is flat. Bowel sounds are normal. There is no distension.      Palpations: Abdomen is soft.      Tenderness: There is no abdominal tenderness.   Musculoskeletal:         General: No swelling. Normal range of motion.      Cervical back: Normal range of motion. No rigidity.      Right lower leg: No  edema.      Left lower leg: No edema.   Skin:     General: Skin is warm and dry.      Findings: No lesion or rash.   Neurological:      General: No focal deficit present.      Mental Status: He is alert and oriented to person, place, and time.      Cranial Nerves: No cranial nerve deficit.      Motor: No weakness.       Significant Labs:  CBC:  Recent Labs   Lab 10/06/22  0425 10/07/22  0358 10/08/22  0547   WBC 7.07 7.53 7.97   RBC 3.21* 3.17* 3.03*   HGB 8.9* 8.8* 8.4*   HCT 28.3* 27.7* 26.8*    328 264   MCV 88 87 88   MCH 27.7 27.8 27.7   MCHC 31.4* 31.8* 31.3*       BNP:  No results for input(s): BNP in the last 168 hours.    Invalid input(s): BNPTRIAGELBLO  CMP:  Recent Labs   Lab 10/01/22  2246 10/02/22  0351 10/03/22  0506 10/06/22  0425 10/07/22  0358 10/08/22  0547   * 105   < > 142* 101 92   CALCIUM 8.9 8.5*   < > 8.9 8.9 9.1   ALBUMIN 3.1* 2.9*  --   --   --   --    PROT 6.7 6.5  --   --   --   --    * 135*   < > 141 140 139   K 3.8 3.7   < > 4.2 4.0 4.1   CO2 21* 22*   < > 25 22* 25   CL 99 98   < > 106 105 104   BUN 31* 29*   < > 38* 36* 33*   CREATININE 2.1* 2.0*   < > 1.5* 1.5* 1.7*   ALKPHOS 81 83  --   --   --   --    ALT 13 13  --   --   --   --    AST 15 15  --   --   --   --    BILITOT 0.8 0.8  --   --   --   --     < > = values in this interval not displayed.        Coagulation:   Recent Labs   Lab 10/01/22  1547 10/01/22  2246 10/06/22  1449 10/07/22  0358 10/08/22  0547   INR 1.0  --   --   --   --    APTT 28.7   < > 56.8* 65.1* 49.4*    < > = values in this interval not displayed.       LDH:  No results for input(s): LDH in the last 72 hours.  Microbiology:  Microbiology Results (last 7 days)       Procedure Component Value Units Date/Time    Blood culture [[id number]] Collected: 09/30/22 0241    Order Status: Completed Specimen: Blood from Peripheral, Hand, Right Updated: 10/05/22 0612     Blood Culture, Routine No growth after 5 days.    Blood culture [[id number]]  Collected: 09/30/22 0243    Order Status: Completed Specimen: Blood from Peripheral, Antecubital, Right Updated: 10/05/22 0612     Blood Culture, Routine No growth after 5 days.    Culture, Anaerobe [[id number]] Collected: 09/27/22 1026    Order Status: Completed Specimen: Abscess from Heart Updated: 10/04/22 0933     Anaerobic Culture No anaerobes isolated    Narrative:      LV Lead Tip    Culture, Anaerobe [[id number]] Collected: 09/27/22 1133    Order Status: Completed Specimen: Abscess from Heart Updated: 10/04/22 0933     Anaerobic Culture No anaerobes isolated    Narrative:      RA Lead Tip    Culture, Anaerobe [[id number]] Collected: 09/27/22 1032    Order Status: Completed Specimen: Abscess from Heart Updated: 10/04/22 0932     Anaerobic Culture No anaerobes isolated    Narrative:      RA Lead drainage #1    Culture, Anaerobe [[id number]] Collected: 09/27/22 1035    Order Status: Completed Specimen: Abscess from Heart Updated: 10/04/22 0932     Anaerobic Culture No anaerobes isolated    Narrative:      RA Lead drainage #2    Culture, Anaerobe [[id number]] Collected: 09/27/22 0947    Order Status: Completed Specimen: Abscess from Chest, Left Updated: 10/04/22 0932     Anaerobic Culture No anaerobes isolated    Narrative:      Deep Pocket #1    Culture, Anaerobe [[id number]] Collected: 09/27/22 0952    Order Status: Completed Specimen: Abscess from Chest, Left Updated: 10/04/22 0932     Anaerobic Culture No anaerobes isolated    Narrative:      Deep Pocket #2    Culture, Anaerobe [[id number]] Collected: 09/27/22 0936    Order Status: Completed Specimen: Abscess from Chest, Left Updated: 10/04/22 0932     Anaerobic Culture No anaerobes isolated    Narrative:      Shallow Pocket    Culture, Anaerobe [[id number]] Collected: 09/27/22 1138    Order Status: Completed Specimen: Abscess from Heart Updated: 10/03/22 1051     Anaerobic Culture No anaerobes isolated    Narrative:      RV Lead Tip    Culture, Respiratory with  Gram Stain [[id number]] Collected: 10/01/22 1041    Order Status: Completed Specimen: Respiratory from Endotracheal Aspirate Updated: 10/03/22 1043     Respiratory Culture Normal respiratory jadiel      No S aureus or Pseudomonas isolated.     Gram Stain (Respiratory) <10 epithelial cells per low power field.     Gram Stain (Respiratory) Rare WBC's     Gram Stain (Respiratory) No organisms seen            BMP:   Recent Labs   Lab 10/08/22  0547   GLU 92      K 4.1      CO2 25   BUN 33*   CREATININE 1.7*   CALCIUM 9.1   MG 1.9       I have reviewed all pertinent labs within the past 24 hours.    Estimated Creatinine Clearance: 44 mL/min (A) (based on SCr of 1.7 mg/dL (H)).    Diagnostic Results:  Reviewed     Assessment and Plan:   73 yo WM being worked up for LVAD since hospitalization January 2022 for recurrent VT (he thinks had MI) and cardiogenic shock at [facility]. He was  later seen in [facility] where he was treated for cardiogenic shock and sent home on .  He remains on  and is pursuing evaluation for LVAD.     His case was discussed at selection committee and he was deferred pending evaluation of pancreatic mass.  They wish to proceed with MRI but not sure with his ICD if this will be possible.Presented with MRSA bacteremia on admission.   HARRY 9/21/22: Not concerning for Vegetations. BCx negative 9/18.      9/27 CRT generator and lead extraction, pocket cx => hypotensive post-procedure requiring Epi, ICU  9/28 off Epi, transfer to CSU  9/29 sudden hypoxemic respiratory failure requiring urgent intubation after sats dropped while laying flat for PICC line => tx ICU, panculture       Acute Hypoxic respiratory failure 9/29/22 -resolved  -suspect aspiration event. Completed 5 day course with zosyn 10/4/22  -intubated and sedated 9/29, extubated 10/1/22  -Saturating well on room air.   -step down to floor 10/6.     * Chronic combined systolic and diastolic congestive heart failure  - On Dobutamine 2.5 which  is his baseline and off pressors since 10/2  ,MAPS >60.   - CVP 12. Restarted Po diuretics today. Lasix po 40 BID. Cr at baseline 1.7.   - Daily weights, Strict I/Os  - Monitor electrolytes and Maintain Mg >2 and K >4  -Telemetry monitoring     Pancreatic lesion  - Gastroenterology on board  - EUS with biopsy  10/4, cytology results -negative for malignant cells, c/w cyst contents.      Bacteremia due to methicillin resistant Staphylococcus aureus  - Monitor WBC count and fever curve, pan-culture if patient spikes  - ID consult and recommendations are appreciated  - On Dapto currently end date 11/11 per ID ( 6 wks after device removal 9/27) for the MRSA bacteremia 9/14, 9/15, 9/16 positive for MRSA. 9/18 NG  - completed 5day  Course of zosyn 10/4/22 for aspiration pneumonia.   -9/30 BCX negative , resp cultures 10/1 NG     H/O ventricular tachycardia  - Amiodarone 300 mg, daily  - Monitor LFTs.         Coronary artery disease involving native coronary artery of native heart without angina pectoris  - Asprin 81 mg, daily  - Atorvastatin 80 mg, nightl     Left Brachial Vein Thrombosis 9/21  - on Heparin . Will consider transitioning to either eliquis or coumadin after getting to know his long term plan for [facility].     Acute on Chronic Gout  -resolved  - pred 30 mg . Completed 5 day course 10/6.  -On allopurinol 200 mg         Benton Rendon MD  Heart Transplant  [facility] - Cardiology Stepdown

## 2022-10-08 NOTE — SUBJECTIVE & OBJECTIVE
Interval History: NAEON. CVP 12 this am . Resumed Po lasix 40 BID.  On Dobutmine 2.5( baseline) and off pressors since 10/2. stepdown to floor 10/6/22.         Continuous Infusions:   sodium chloride 0.9% Stopped (09/28/22 1550)    sodium chloride 0.9% 5 mL/hr at 10/06/22 0105    DOBUTamine IV infusion (non-titrating) 2.5 mcg/kg/min (10/07/22 1809)    heparin (porcine) in D5W 19 Units/kg/hr (10/08/22 0700)     Scheduled Meds:   acetaminophen  650 mg Oral QID    allopurinoL  200 mg Oral Daily    amiodarone  300 mg Oral Daily    aspirin  81 mg Oral Daily    atorvastatin  80 mg Oral Daily    DAPTOmycin (CUBICIN) IV (PEDS and ADULTS)  10 mg/kg Intravenous Q24H    famotidine  20 mg Oral Daily    furosemide  40 mg Oral BID    LIDOcaine  1 patch Transdermal Q24H    LIDOcaine HCl 2%  15 mL Oral Once    olopatadine  1 drop Both Eyes Daily    polyethylene glycol  17 g Oral Daily    potassium chloride  40 mEq Oral Once    sucralfate  1 g Oral BID     PRN Meds:sodium chloride, acetaminophen, artificial tears, dextromethorphan-guaiFENesin  mg, heparin (PORCINE), heparin (PORCINE), HYDROcodone-acetaminophen, melatonin, methocarbamoL, ondansetron, senna-docusate 8.6-50 mg, sodium chloride 0.9%    Review of patient's allergies indicates:   Allergen Reactions    Iodine containing multivitamin Swelling     itching    Keflex [cephalexin] Swelling     Eyes.  Tolerated multiple doses of zosyn and 1 dose of augmentin in 2015 and 2016, respectively    Peaches [peach (prunus persica)] Swelling     eyes    Shellfish containing products Swelling    Fig tree Swelling     itching    Tuberculin spenser test ppd Rash     Objective:     Vital Signs (Most Recent):  Temp: 97.9 °F (36.6 °C) (10/08/22 0809)  Pulse: 97 (10/08/22 1000)  Resp: (!) 21 (10/08/22 0809)  BP: 129/61 (10/08/22 0809)  SpO2: 96 % (10/08/22 0809)   Vital Signs (24h Range):  Temp:  [97.8 °F (36.6 °C)-98 °F (36.7 °C)] 97.9 °F (36.6 °C)  Pulse:  [] 97  Resp:  [16-21]  21  SpO2:  [92 %-98 %] 96 %  BP: (111-129)/(61-70) 129/61     Patient Vitals for the past 72 hrs (Last 3 readings):   Weight   10/06/22 0400 93.2 kg (205 lb 9.3 oz)       Body mass index is 32.2 kg/m².      Intake/Output Summary (Last 24 hours) at 10/8/2022 1151  Last data filed at 10/8/2022 0833  Gross per 24 hour   Intake 906.1 ml   Output 980 ml   Net -73.9 ml           Physical Exam  Constitutional:       General: He is not in acute distress.     Appearance: Normal appearance. He is normal weight. He is not ill-appearing or toxic-appearing.   HENT:      Head: Normocephalic and atraumatic.      Nose: Nose normal. No congestion.      Mouth/Throat:      Mouth: Mucous membranes are moist.      Pharynx: No oropharyngeal exudate.   Eyes:      General:         Right eye: No discharge.         Left eye: No discharge.      Extraocular Movements: Extraocular movements intact.      Pupils: Pupils are equal, round, and reactive to light.   Cardiovascular:      Rate and Rhythm: Normal rate and regular rhythm.      Heart sounds: No murmur heard.    No friction rub. No gallop.   Pulmonary:      Effort: Pulmonary effort is normal. No respiratory distress.      Breath sounds: Normal breath sounds. No wheezing, rhonchi or rales.   Abdominal:      General: Abdomen is flat. Bowel sounds are normal. There is no distension.      Palpations: Abdomen is soft.      Tenderness: There is no abdominal tenderness.   Musculoskeletal:         General: No swelling. Normal range of motion.      Cervical back: Normal range of motion. No rigidity.      Right lower leg: No edema.      Left lower leg: No edema.   Skin:     General: Skin is warm and dry.      Findings: No lesion or rash.   Neurological:      General: No focal deficit present.      Mental Status: He is alert and oriented to person, place, and time.      Cranial Nerves: No cranial nerve deficit.      Motor: No weakness.       Significant Labs:  CBC:  Recent Labs   Lab 10/06/22  0469  10/07/22  0358 10/08/22  0547   WBC 7.07 7.53 7.97   RBC 3.21* 3.17* 3.03*   HGB 8.9* 8.8* 8.4*   HCT 28.3* 27.7* 26.8*    328 264   MCV 88 87 88   MCH 27.7 27.8 27.7   MCHC 31.4* 31.8* 31.3*       BNP:  No results for input(s): BNP in the last 168 hours.    Invalid input(s): BNPTRIAGELBLO  CMP:  Recent Labs   Lab 10/01/22  2246 10/02/22  0351 10/03/22  0506 10/06/22  0425 10/07/22  0358 10/08/22  0547   * 105   < > 142* 101 92   CALCIUM 8.9 8.5*   < > 8.9 8.9 9.1   ALBUMIN 3.1* 2.9*  --   --   --   --    PROT 6.7 6.5  --   --   --   --    * 135*   < > 141 140 139   K 3.8 3.7   < > 4.2 4.0 4.1   CO2 21* 22*   < > 25 22* 25   CL 99 98   < > 106 105 104   BUN 31* 29*   < > 38* 36* 33*   CREATININE 2.1* 2.0*   < > 1.5* 1.5* 1.7*   ALKPHOS 81 83  --   --   --   --    ALT 13 13  --   --   --   --    AST 15 15  --   --   --   --    BILITOT 0.8 0.8  --   --   --   --     < > = values in this interval not displayed.        Coagulation:   Recent Labs   Lab 10/01/22  1547 10/01/22  2246 10/06/22  1449 10/07/22  0358 10/08/22  0547   INR 1.0  --   --   --   --    APTT 28.7   < > 56.8* 65.1* 49.4*    < > = values in this interval not displayed.       LDH:  No results for input(s): LDH in the last 72 hours.  Microbiology:  Microbiology Results (last 7 days)       Procedure Component Value Units Date/Time    Blood culture [[id number]] Collected: 09/30/22 0241    Order Status: Completed Specimen: Blood from Peripheral, Hand, Right Updated: 10/05/22 0612     Blood Culture, Routine No growth after 5 days.    Blood culture [[id number]] Collected: 09/30/22 0243    Order Status: Completed Specimen: Blood from Peripheral, Antecubital, Right Updated: 10/05/22 0612     Blood Culture, Routine No growth after 5 days.    Culture, Anaerobe [[id number]] Collected: 09/27/22 1026    Order Status: Completed Specimen: Abscess from Heart Updated: 10/04/22 0933     Anaerobic Culture No anaerobes isolated    Narrative:      LV Lead  Tip    Culture, Anaerobe [[id number]] Collected: 09/27/22 1133    Order Status: Completed Specimen: Abscess from Heart Updated: 10/04/22 0933     Anaerobic Culture No anaerobes isolated    Narrative:      RA Lead Tip    Culture, Anaerobe [[id number]] Collected: 09/27/22 1032    Order Status: Completed Specimen: Abscess from Heart Updated: 10/04/22 0932     Anaerobic Culture No anaerobes isolated    Narrative:      RA Lead drainage #1    Culture, Anaerobe [[id number]] Collected: 09/27/22 1035    Order Status: Completed Specimen: Abscess from Heart Updated: 10/04/22 0932     Anaerobic Culture No anaerobes isolated    Narrative:      RA Lead drainage #2    Culture, Anaerobe [[id number]] Collected: 09/27/22 0947    Order Status: Completed Specimen: Abscess from Chest, Left Updated: 10/04/22 0932     Anaerobic Culture No anaerobes isolated    Narrative:      Deep Pocket #1    Culture, Anaerobe [[id number]] Collected: 09/27/22 0952    Order Status: Completed Specimen: Abscess from Chest, Left Updated: 10/04/22 0932     Anaerobic Culture No anaerobes isolated    Narrative:      Deep Pocket #2    Culture, Anaerobe [[id number]] Collected: 09/27/22 0936    Order Status: Completed Specimen: Abscess from Chest, Left Updated: 10/04/22 0932     Anaerobic Culture No anaerobes isolated    Narrative:      Shallow Pocket    Culture, Anaerobe [[id number]] Collected: 09/27/22 1138    Order Status: Completed Specimen: Abscess from Heart Updated: 10/03/22 1051     Anaerobic Culture No anaerobes isolated    Narrative:      RV Lead Tip    Culture, Respiratory with Gram Stain [[id number]] Collected: 10/01/22 1041    Order Status: Completed Specimen: Respiratory from Endotracheal Aspirate Updated: 10/03/22 1043     Respiratory Culture Normal respiratory jadiel      No S aureus or Pseudomonas isolated.     Gram Stain (Respiratory) <10 epithelial cells per low power field.     Gram Stain (Respiratory) Rare WBC's     Gram Stain (Respiratory) No  organisms seen            BMP:   Recent Labs   Lab 10/08/22  0547   GLU 92      K 4.1      CO2 25   BUN 33*   CREATININE 1.7*   CALCIUM 9.1   MG 1.9       I have reviewed all pertinent labs within the past 24 hours.    Estimated Creatinine Clearance: 44 mL/min (A) (based on SCr of 1.7 mg/dL (H)).    Diagnostic Results:  Reviewed

## 2022-10-08 NOTE — PLAN OF CARE
Problem: Skin Injury Risk Increased  Goal: Skin Health and Integrity  10/8/2022 1747 by Cherelle Correa RN  Outcome: Ongoing, Progressing  10/8/2022 1747 by Cherelle Correa RN  Outcome: Ongoing, Progressing     Problem: Pain Acute  Goal: Acceptable Pain Control and Functional Ability  10/8/2022 1747 by Cherelle Correa RN  Outcome: Ongoing, Progressing  10/8/2022 1747 by Cherelle Correa RN  Outcome: Ongoing, Progressing     Problem: Adult Inpatient Plan of Care  Goal: Plan of Care Review  10/8/2022 1747 by Cherelle Correa RN  Outcome: Ongoing, Progressing  10/8/2022 1747 by Cherelle Correa RN  Outcome: Ongoing, Progressing  Goal: Absence of Hospital-Acquired Illness or Injury  10/8/2022 1747 by Cherelle Correa RN  Outcome: Ongoing, Progressing  10/8/2022 1747 by Cherelle Correa RN  Outcome: Ongoing, Progressing  Goal: Optimal Comfort and Wellbeing  10/8/2022 1747 by Cherelle Correa RN  Outcome: Ongoing, Progressing  10/8/2022 1747 by Cherelle Correa RN  Outcome: Ongoing, Progressing     Problem: Cardiac Output Decreased (Heart Failure)  Goal: Optimal Cardiac Output  Outcome: Ongoing, Progressing     Problem: Dysrhythmia (Heart Failure)  Goal: Stable Heart Rate and Rhythm  Outcome: Ongoing, Progressing     Problem: Fluid Imbalance (Heart Failure)  Goal: Fluid Balance  Outcome: Ongoing, Progressing     Problem: Functional Ability Impaired (Heart Failure)  Goal: Optimal Functional Ability  Outcome: Ongoing, Progressing    Plan of care discussed with patient. AAOx4. VSS. Patient ambulating independently, fall precautions in place. Heparin at 19U/kg, at 14.7mL/h. Dobutamine 2.5 mcg/min/kg , 3.7mL/h. Pt refuses to wear life vest. Discussed medications and care. Patient has no questions at this time. Will continue to monitor.

## 2022-10-09 NOTE — PROGRESS NOTES
[facility] - Cardiology Stepdown  Heart Transplant  Progress Note    Patient Name: Audrey Oneil Jr.  MRN: [id number]  Admission Date: 9/14/2022  Hospital Length of Stay: 25 days  Attending Physician: Edison Marshall MD  Primary Care Provider: Primary Doctor No  Principal Problem:Acute on chronic combined systolic and diastolic heart failure    Subjective:     Interval History: NAEON. CVP 13 this am . Resumed Po lasix 40 BID yesterday. Cr stable at 1.7.  On Dobutmine 2.5( baseline) and off pressors since 10/2. stepdown to floor 10/6/22.         Continuous Infusions:   sodium chloride 0.9% Stopped (09/28/22 1550)    sodium chloride 0.9% 5 mL/hr at 10/06/22 0105    DOBUTamine IV infusion (non-titrating) 2.5 mcg/kg/min (10/07/22 1809)    heparin (porcine) in D5W 19 Units/kg/hr (10/09/22 0859)     Scheduled Meds:   acetaminophen  650 mg Oral QID    allopurinoL  200 mg Oral Daily    amiodarone  300 mg Oral Daily    aspirin  81 mg Oral Daily    atorvastatin  80 mg Oral Daily    colchicine  0.6 mg Oral Daily    DAPTOmycin (CUBICIN) IV (PEDS and ADULTS)  10 mg/kg Intravenous Q24H    famotidine  20 mg Oral Daily    furosemide  40 mg Oral BID    LIDOcaine  1 patch Transdermal Q24H    LIDOcaine HCl 2%  15 mL Oral Once    olopatadine  1 drop Both Eyes Daily    polyethylene glycol  17 g Oral Daily    potassium chloride  40 mEq Oral Once    sucralfate  1 g Oral BID     PRN Meds:sodium chloride, acetaminophen, artificial tears, dextromethorphan-guaiFENesin  mg, heparin (PORCINE), heparin (PORCINE), HYDROcodone-acetaminophen, melatonin, methocarbamoL, ondansetron, senna-docusate 8.6-50 mg, sodium chloride 0.9%    Review of patient's allergies indicates:   Allergen Reactions    Iodine containing multivitamin Swelling     itching    Keflex [cephalexin] Swelling     Eyes.  Tolerated multiple doses of zosyn and 1 dose of augmentin in 2015 and 2016, respectively    Peaches [peach (prunus persica)] Swelling      eyes    Shellfish containing products Swelling    Fig tree Swelling     itching    Tuberculin spenser test ppd Rash     Objective:     Vital Signs (Most Recent):  Temp: 98.2 °F (36.8 °C) (10/09/22 0727)  Pulse: 97 (10/09/22 0748)  Resp: 20 (10/09/22 0727)  BP: (!) 105/58 (10/09/22 0727)  SpO2: (!) 94 % (10/09/22 0812)   Vital Signs (24h Range):  Temp:  [97.6 °F (36.4 °C)-98.4 °F (36.9 °C)] 98.2 °F (36.8 °C)  Pulse:  [] 97  Resp:  [16-20] 20  SpO2:  [85 %-94 %] 94 %  BP: ()/(51-59) 105/58     Patient Vitals for the past 72 hrs (Last 3 readings):   Weight   10/09/22 0820 91.5 kg (201 lb 12.8 oz)       Body mass index is 31.61 kg/m².      Intake/Output Summary (Last 24 hours) at 10/9/2022 0932  Last data filed at 10/9/2022 0600  Gross per 24 hour   Intake 459 ml   Output 1925 ml   Net -1466 ml           Physical Exam  Constitutional:       General: He is not in acute distress.     Appearance: Normal appearance. He is normal weight. He is not ill-appearing or toxic-appearing.   HENT:      Head: Normocephalic and atraumatic.      Nose: Nose normal. No congestion.      Mouth/Throat:      Mouth: Mucous membranes are moist.      Pharynx: No oropharyngeal exudate.   Eyes:      General:         Right eye: No discharge.         Left eye: No discharge.      Extraocular Movements: Extraocular movements intact.      Pupils: Pupils are equal, round, and reactive to light.   Cardiovascular:      Rate and Rhythm: Normal rate and regular rhythm.      Heart sounds: No murmur heard.    No friction rub. No gallop.   Pulmonary:      Effort: Pulmonary effort is normal. No respiratory distress.      Breath sounds: Normal breath sounds. No wheezing, rhonchi or rales.   Abdominal:      General: Abdomen is flat. Bowel sounds are normal. There is no distension.      Palpations: Abdomen is soft.      Tenderness: There is no abdominal tenderness.   Musculoskeletal:         General: No swelling. Normal range of motion.      Cervical  back: Normal range of motion. No rigidity.      Right lower leg: No edema.      Left lower leg: No edema.   Skin:     General: Skin is warm and dry.      Findings: No lesion or rash.   Neurological:      General: No focal deficit present.      Mental Status: He is alert and oriented to person, place, and time.      Cranial Nerves: No cranial nerve deficit.      Motor: No weakness.       Significant Labs:  CBC:  Recent Labs   Lab 10/07/22  0358 10/08/22  0547 10/09/22  0537   WBC 7.53 7.97 8.40   RBC 3.17* 3.03* 3.10*   HGB 8.8* 8.4* 8.7*   HCT 27.7* 26.8* 27.1*    264 262   MCV 87 88 87   MCH 27.8 27.7 28.1   MCHC 31.8* 31.3* 32.1       BNP:  No results for input(s): BNP in the last 168 hours.    Invalid input(s): BNPTRIAGELBLO  CMP:  Recent Labs   Lab 10/07/22  0358 10/08/22  0547 10/09/22  0537    92 98   CALCIUM 8.9 9.1 9.0    139 135*   K 4.0 4.1 4.1   CO2 22* 25 23    104 99   BUN 36* 33* 33*   CREATININE 1.5* 1.7* 1.7*        Coagulation:   Recent Labs   Lab 10/07/22  0358 10/08/22  0547 10/09/22  0537   APTT 65.1* 49.4* 43.3*       LDH:  No results for input(s): LDH in the last 72 hours.  Microbiology:  Microbiology Results (last 7 days)       Procedure Component Value Units Date/Time    Blood culture [[id number]] Collected: 09/30/22 0241    Order Status: Completed Specimen: Blood from Peripheral, Hand, Right Updated: 10/05/22 0612     Blood Culture, Routine No growth after 5 days.    Blood culture [[id number]] Collected: 09/30/22 0243    Order Status: Completed Specimen: Blood from Peripheral, Antecubital, Right Updated: 10/05/22 0612     Blood Culture, Routine No growth after 5 days.    Culture, Anaerobe [[id number]] Collected: 09/27/22 1026    Order Status: Completed Specimen: Abscess from Heart Updated: 10/04/22 0933     Anaerobic Culture No anaerobes isolated    Narrative:      LV Lead Tip    Culture, Anaerobe [[id number]] Collected: 09/27/22 1133    Order Status: Completed  Specimen: Abscess from Heart Updated: 10/04/22 0933     Anaerobic Culture No anaerobes isolated    Narrative:      RA Lead Tip    Culture, Anaerobe [[id number]] Collected: 09/27/22 1032    Order Status: Completed Specimen: Abscess from Heart Updated: 10/04/22 0932     Anaerobic Culture No anaerobes isolated    Narrative:      RA Lead drainage #1    Culture, Anaerobe [[id number]] Collected: 09/27/22 1035    Order Status: Completed Specimen: Abscess from Heart Updated: 10/04/22 0932     Anaerobic Culture No anaerobes isolated    Narrative:      RA Lead drainage #2    Culture, Anaerobe [[id number]] Collected: 09/27/22 0947    Order Status: Completed Specimen: Abscess from Chest, Left Updated: 10/04/22 0932     Anaerobic Culture No anaerobes isolated    Narrative:      Deep Pocket #1    Culture, Anaerobe [[id number]] Collected: 09/27/22 0952    Order Status: Completed Specimen: Abscess from Chest, Left Updated: 10/04/22 0932     Anaerobic Culture No anaerobes isolated    Narrative:      Deep Pocket #2    Culture, Anaerobe [[id number]] Collected: 09/27/22 0936    Order Status: Completed Specimen: Abscess from Chest, Left Updated: 10/04/22 0932     Anaerobic Culture No anaerobes isolated    Narrative:      Shallow Pocket    Culture, Anaerobe [[id number]] Collected: 09/27/22 1138    Order Status: Completed Specimen: Abscess from Heart Updated: 10/03/22 1051     Anaerobic Culture No anaerobes isolated    Narrative:      RV Lead Tip    Culture, Respiratory with Gram Stain [[id number]] Collected: 10/01/22 1041    Order Status: Completed Specimen: Respiratory from Endotracheal Aspirate Updated: 10/03/22 1043     Respiratory Culture Normal respiratory jadiel      No S aureus or Pseudomonas isolated.     Gram Stain (Respiratory) <10 epithelial cells per low power field.     Gram Stain (Respiratory) Rare WBC's     Gram Stain (Respiratory) No organisms seen            BMP:   Recent Labs   Lab 10/09/22  0537   GLU 98   *   K 4.1   CL  99   CO2 23   BUN 33*   CREATININE 1.7*   CALCIUM 9.0   MG 1.9       I have reviewed all pertinent labs within the past 24 hours.    Estimated Creatinine Clearance: 43.6 mL/min (A) (based on SCr of 1.7 mg/dL (H)).    Diagnostic Results:  Reviewed     Assessment and Plan:   71 yo WM being worked up for LVAD since hospitalization January 2022 for recurrent VT (he thinks had MI) and cardiogenic shock at [facility]. He was  later seen in [facility] where he was treated for cardiogenic shock and sent home on .  He remains on  and is pursuing evaluation for LVAD.     His case was discussed at selection committee and he was deferred pending evaluation of pancreatic mass.  They wish to proceed with MRI but not sure with his ICD if this will be possible.Presented with MRSA bacteremia on admission.   HARRY 9/21/22: Not concerning for Vegetations. BCx negative 9/18.      9/27 CRT generator and lead extraction, pocket cx => hypotensive post-procedure requiring Epi, ICU  9/28 off Epi, transfer to CSU  9/29 sudden hypoxemic respiratory failure requiring urgent intubation after sats dropped while laying flat for PICC line => tx ICU, panculture       Acute Hypoxic respiratory failure 9/29/22 -resolved  -suspect aspiration event. Completed 5 day course with zosyn 10/4/22  -intubated and sedated 9/29, extubated 10/1/22  -Saturating well on room air.   -step down to floor 10/6.     * Chronic combined systolic and diastolic congestive heart failure  - On Dobutamine 2.5 which is his baseline and off pressors since 10/2  ,MAPS >60.   - CVP 13. Restarted Po diuretics yesterday. Lasix po 40 BID. Put out 1.9 l urine since yesterday noon. Will continue the same. Cr at baseline 1.7.   - Daily weights, Strict I/Os  - Monitor electrolytes and Maintain Mg >2 and K >4  -Telemetry monitoring     Pancreatic lesion  - Gastroenterology on board  - EUS with biopsy  10/4, cytology results -negative for malignant cells, c/w cyst contents.      Bacteremia due  to methicillin resistant Staphylococcus aureus  - Monitor WBC count and fever curve, pan-culture if patient spikes  - ID consult and recommendations are appreciated  - On Dapto currently end date 11/11 per ID ( 6 wks after device removal 9/27) for the MRSA bacteremia 9/14, 9/15, 9/16 positive for MRSA. 9/18 NG  - completed 5day  Course of zosyn 10/4/22 for aspiration pneumonia.   -9/30 BCX negative , resp cultures 10/1 NG     H/O ventricular tachycardia  - Amiodarone 300 mg, daily  - Monitor LFTs.         Coronary artery disease involving native coronary artery of native heart without angina pectoris  - Asprin 81 mg, daily  - Atorvastatin 80 mg, nightl     Left Brachial Vein Thrombosis 9/21  - on Heparin . Will consider transitioning to either eliquis or coumadin after getting to know his long term plan for [facility].     Acute on Chronic Gout  -2nd recurrence last night while in the hospital.( 1st recurrence treated with 5 day course Pred completed 10/6).   -Treating with colchicine 0.6 mg OD. Cr. 1.7, gfr 42  -On allopurinol 200 mg       Benton Rendon MD  Heart Transplant  [facility] - Cardiology Stepdown

## 2022-10-09 NOTE — SUBJECTIVE & OBJECTIVE
Interval History: NAEON. CVP 13 this am . Resumed Po lasix 40 BID yesterday. Cr stable at 1.7.  On Dobutmine 2.5( baseline) and off pressors since 10/2. stepdown to floor 10/6/22.         Continuous Infusions:   sodium chloride 0.9% Stopped (09/28/22 1550)    sodium chloride 0.9% 5 mL/hr at 10/06/22 0105    DOBUTamine IV infusion (non-titrating) 2.5 mcg/kg/min (10/07/22 1809)    heparin (porcine) in D5W 19 Units/kg/hr (10/09/22 0859)     Scheduled Meds:   acetaminophen  650 mg Oral QID    allopurinoL  200 mg Oral Daily    amiodarone  300 mg Oral Daily    aspirin  81 mg Oral Daily    atorvastatin  80 mg Oral Daily    colchicine  0.6 mg Oral Daily    DAPTOmycin (CUBICIN) IV (PEDS and ADULTS)  10 mg/kg Intravenous Q24H    famotidine  20 mg Oral Daily    furosemide  40 mg Oral BID    LIDOcaine  1 patch Transdermal Q24H    LIDOcaine HCl 2%  15 mL Oral Once    olopatadine  1 drop Both Eyes Daily    polyethylene glycol  17 g Oral Daily    potassium chloride  40 mEq Oral Once    sucralfate  1 g Oral BID     PRN Meds:sodium chloride, acetaminophen, artificial tears, dextromethorphan-guaiFENesin  mg, heparin (PORCINE), heparin (PORCINE), HYDROcodone-acetaminophen, melatonin, methocarbamoL, ondansetron, senna-docusate 8.6-50 mg, sodium chloride 0.9%    Review of patient's allergies indicates:   Allergen Reactions    Iodine containing multivitamin Swelling     itching    Keflex [cephalexin] Swelling     Eyes.  Tolerated multiple doses of zosyn and 1 dose of augmentin in 2015 and 2016, respectively    Peaches [peach (prunus persica)] Swelling     eyes    Shellfish containing products Swelling    Fig tree Swelling     itching    Tuberculin spenser test ppd Rash     Objective:     Vital Signs (Most Recent):  Temp: 98.2 °F (36.8 °C) (10/09/22 0727)  Pulse: 97 (10/09/22 0748)  Resp: 20 (10/09/22 0727)  BP: (!) 105/58 (10/09/22 0727)  SpO2: (!) 94 % (10/09/22 0812)   Vital Signs (24h Range):  Temp:  [97.6 °F (36.4 °C)-98.4 °F  (36.9 °C)] 98.2 °F (36.8 °C)  Pulse:  [] 97  Resp:  [16-20] 20  SpO2:  [85 %-94 %] 94 %  BP: ()/(51-59) 105/58     Patient Vitals for the past 72 hrs (Last 3 readings):   Weight   10/09/22 0820 91.5 kg (201 lb 12.8 oz)       Body mass index is 31.61 kg/m².      Intake/Output Summary (Last 24 hours) at 10/9/2022 0932  Last data filed at 10/9/2022 0600  Gross per 24 hour   Intake 459 ml   Output 1925 ml   Net -1466 ml           Physical Exam  Constitutional:       General: He is not in acute distress.     Appearance: Normal appearance. He is normal weight. He is not ill-appearing or toxic-appearing.   HENT:      Head: Normocephalic and atraumatic.      Nose: Nose normal. No congestion.      Mouth/Throat:      Mouth: Mucous membranes are moist.      Pharynx: No oropharyngeal exudate.   Eyes:      General:         Right eye: No discharge.         Left eye: No discharge.      Extraocular Movements: Extraocular movements intact.      Pupils: Pupils are equal, round, and reactive to light.   Cardiovascular:      Rate and Rhythm: Normal rate and regular rhythm.      Heart sounds: No murmur heard.    No friction rub. No gallop.   Pulmonary:      Effort: Pulmonary effort is normal. No respiratory distress.      Breath sounds: Normal breath sounds. No wheezing, rhonchi or rales.   Abdominal:      General: Abdomen is flat. Bowel sounds are normal. There is no distension.      Palpations: Abdomen is soft.      Tenderness: There is no abdominal tenderness.   Musculoskeletal:         General: No swelling. Normal range of motion.      Cervical back: Normal range of motion. No rigidity.      Right lower leg: No edema.      Left lower leg: No edema.   Skin:     General: Skin is warm and dry.      Findings: No lesion or rash.   Neurological:      General: No focal deficit present.      Mental Status: He is alert and oriented to person, place, and time.      Cranial Nerves: No cranial nerve deficit.      Motor: No weakness.        Significant Labs:  CBC:  Recent Labs   Lab 10/07/22  0358 10/08/22  0547 10/09/22  0537   WBC 7.53 7.97 8.40   RBC 3.17* 3.03* 3.10*   HGB 8.8* 8.4* 8.7*   HCT 27.7* 26.8* 27.1*    264 262   MCV 87 88 87   MCH 27.8 27.7 28.1   MCHC 31.8* 31.3* 32.1       BNP:  No results for input(s): BNP in the last 168 hours.    Invalid input(s): BNPTRIAGELBLO  CMP:  Recent Labs   Lab 10/07/22  0358 10/08/22  0547 10/09/22  0537    92 98   CALCIUM 8.9 9.1 9.0    139 135*   K 4.0 4.1 4.1   CO2 22* 25 23    104 99   BUN 36* 33* 33*   CREATININE 1.5* 1.7* 1.7*        Coagulation:   Recent Labs   Lab 10/07/22  0358 10/08/22  0547 10/09/22  0537   APTT 65.1* 49.4* 43.3*       LDH:  No results for input(s): LDH in the last 72 hours.  Microbiology:  Microbiology Results (last 7 days)       Procedure Component Value Units Date/Time    Blood culture [[id number]] Collected: 09/30/22 0241    Order Status: Completed Specimen: Blood from Peripheral, Hand, Right Updated: 10/05/22 0612     Blood Culture, Routine No growth after 5 days.    Blood culture [[id number]] Collected: 09/30/22 0243    Order Status: Completed Specimen: Blood from Peripheral, Antecubital, Right Updated: 10/05/22 0612     Blood Culture, Routine No growth after 5 days.    Culture, Anaerobe [[id number]] Collected: 09/27/22 1026    Order Status: Completed Specimen: Abscess from Heart Updated: 10/04/22 0933     Anaerobic Culture No anaerobes isolated    Narrative:      LV Lead Tip    Culture, Anaerobe [[id number]] Collected: 09/27/22 1133    Order Status: Completed Specimen: Abscess from Heart Updated: 10/04/22 0933     Anaerobic Culture No anaerobes isolated    Narrative:      RA Lead Tip    Culture, Anaerobe [[id number]] Collected: 09/27/22 1032    Order Status: Completed Specimen: Abscess from Heart Updated: 10/04/22 0932     Anaerobic Culture No anaerobes isolated    Narrative:      RA Lead drainage #1    Culture, Anaerobe [[id number]] Collected:  09/27/22 1035    Order Status: Completed Specimen: Abscess from Heart Updated: 10/04/22 0932     Anaerobic Culture No anaerobes isolated    Narrative:      RA Lead drainage #2    Culture, Anaerobe [[id number]] Collected: 09/27/22 0947    Order Status: Completed Specimen: Abscess from Chest, Left Updated: 10/04/22 0932     Anaerobic Culture No anaerobes isolated    Narrative:      Deep Pocket #1    Culture, Anaerobe [[id number]] Collected: 09/27/22 0952    Order Status: Completed Specimen: Abscess from Chest, Left Updated: 10/04/22 0932     Anaerobic Culture No anaerobes isolated    Narrative:      Deep Pocket #2    Culture, Anaerobe [[id number]] Collected: 09/27/22 0936    Order Status: Completed Specimen: Abscess from Chest, Left Updated: 10/04/22 0932     Anaerobic Culture No anaerobes isolated    Narrative:      Shallow Pocket    Culture, Anaerobe [[id number]] Collected: 09/27/22 1138    Order Status: Completed Specimen: Abscess from Heart Updated: 10/03/22 1051     Anaerobic Culture No anaerobes isolated    Narrative:      RV Lead Tip    Culture, Respiratory with Gram Stain [[id number]] Collected: 10/01/22 1041    Order Status: Completed Specimen: Respiratory from Endotracheal Aspirate Updated: 10/03/22 1043     Respiratory Culture Normal respiratory jadiel      No S aureus or Pseudomonas isolated.     Gram Stain (Respiratory) <10 epithelial cells per low power field.     Gram Stain (Respiratory) Rare WBC's     Gram Stain (Respiratory) No organisms seen            BMP:   Recent Labs   Lab 10/09/22  0537   GLU 98   *   K 4.1   CL 99   CO2 23   BUN 33*   CREATININE 1.7*   CALCIUM 9.0   MG 1.9       I have reviewed all pertinent labs within the past 24 hours.    Estimated Creatinine Clearance: 43.6 mL/min (A) (based on SCr of 1.7 mg/dL (H)).    Diagnostic Results:  Reviewed

## 2022-10-09 NOTE — PLAN OF CARE
Patient resting in bed comfortably with no acute complaints. Patient is alert and oriented x4. Walked to the bathroom with 1 person assist. Safety measures in place and call bell within reach.    Patient had 1 episode of nausea after attempting to eat breakfast. Zofran given.     Problem: Skin Injury Risk Increased  Goal: Skin Health and Integrity  Outcome: Ongoing, Progressing     Problem: Pain Acute  Goal: Acceptable Pain Control and Functional Ability  Outcome: Ongoing, Progressing     Problem: Adult Inpatient Plan of Care  Goal: Plan of Care Review  Outcome: Ongoing, Progressing  Goal: Patient-Specific Goal (Individualized)  Outcome: Ongoing, Progressing  Goal: Absence of Hospital-Acquired Illness or Injury  Outcome: Ongoing, Progressing  Goal: Optimal Comfort and Wellbeing  Outcome: Ongoing, Progressing  Goal: Readiness for Transition of Care  Outcome: Ongoing, Progressing     Problem: Cardiac Output Decreased (Heart Failure)  Goal: Optimal Cardiac Output  Outcome: Ongoing, Progressing     Problem: Dysrhythmia (Heart Failure)  Goal: Stable Heart Rate and Rhythm  Outcome: Ongoing, Progressing     Problem: Fluid Imbalance (Heart Failure)  Goal: Fluid Balance  Outcome: Ongoing, Progressing     Problem: Functional Ability Impaired (Heart Failure)  Goal: Optimal Functional Ability  Outcome: Ongoing, Progressing

## 2022-10-09 NOTE — PROGRESS NOTES
RN attempted to draw sample for VBG, pt doesn't want it drawn right now, RN will notify Respiratory when pt is ready.

## 2022-10-10 NOTE — PROGRESS NOTES
[facility] - Cardiology Stepdown  Heart Transplant  Progress Note    Patient Name: Audrey Oneil Jr.  MRN: [id number]  Admission Date: 9/14/2022  Hospital Length of Stay: 26 days  Attending Physician: Edison Marshall MD  Primary Care Provider: Primary Doctor No  Principal Problem:Acute on chronic combined systolic and diastolic heart failure    Subjective:     Interval History: CVP 11 this am . MVO2 was 36 last night and night team went up on  from 2.5( baseline) ---5. Resumed Po lasix 40 BID 10/8 . Cr stable at 1.7.  off pressors since 10/2. stepdown to floor 10/6/22.     Will get a rt arm PICC today and most likely d/ on dobutamine if stable tomorrow.         Continuous Infusions:   sodium chloride 0.9% Stopped (09/28/22 1550)    sodium chloride 0.9% 5 mL/hr at 10/06/22 0105    DOBUTamine IV infusion (non-titrating) 5 mcg/kg/min (10/10/22 0644)    heparin (porcine) in D5W 19 Units/kg/hr (10/10/22 0644)     Scheduled Meds:   acetaminophen  650 mg Oral QID    allopurinoL  200 mg Oral Daily    amiodarone  300 mg Oral Daily    aspirin  81 mg Oral Daily    atorvastatin  80 mg Oral Daily    colchicine  0.6 mg Oral Daily    DAPTOmycin (CUBICIN) IV (PEDS and ADULTS)  10 mg/kg Intravenous Q24H    famotidine  20 mg Oral Daily    furosemide  40 mg Oral BID    LIDOcaine  1 patch Transdermal Q24H    LIDOcaine HCl 2%  15 mL Oral Once    olopatadine  1 drop Both Eyes Daily    polyethylene glycol  17 g Oral Daily    potassium chloride  40 mEq Oral Once    sucralfate  1 g Oral BID     PRN Meds:sodium chloride, acetaminophen, artificial tears, dextromethorphan-guaiFENesin  mg, heparin (PORCINE), heparin (PORCINE), HYDROcodone-acetaminophen, melatonin, methocarbamoL, ondansetron, senna-docusate 8.6-50 mg, sodium chloride 0.9%    Review of patient's allergies indicates:   Allergen Reactions    Iodine containing multivitamin Swelling     itching    Keflex [cephalexin] Swelling     Eyes.  Tolerated multiple  doses of zosyn and 1 dose of augmentin in 2015 and 2016, respectively    Peaches [peach (prunus persica)] Swelling     eyes    Shellfish containing products Swelling    Fig tree Swelling     itching    Tuberculin spenser test ppd Rash     Objective:     Vital Signs (Most Recent):  Temp: 98.4 °F (36.9 °C) (10/10/22 1208)  Pulse: 96 (10/10/22 1208)  Resp: 19 (10/10/22 1208)  BP: (!) 107/58 (10/10/22 1208)  SpO2: (!) 91 % (10/10/22 1208)   Vital Signs (24h Range):  Temp:  [98.1 °F (36.7 °C)-98.4 °F (36.9 °C)] 98.4 °F (36.9 °C)  Pulse:  [] 96  Resp:  [17-20] 19  SpO2:  [90 %-100 %] 91 %  BP: ()/(52-68) 107/58     Patient Vitals for the past 72 hrs (Last 3 readings):   Weight   10/09/22 0820 91.5 kg (201 lb 12.8 oz)       Body mass index is 31.61 kg/m².      Intake/Output Summary (Last 24 hours) at 10/10/2022 1306  Last data filed at 10/10/2022 0644  Gross per 24 hour   Intake 486.4 ml   Output 950 ml   Net -463.6 ml           Physical Exam  Constitutional:       General: He is not in acute distress.     Appearance: Normal appearance. He is normal weight. He is not ill-appearing or toxic-appearing.   HENT:      Head: Normocephalic and atraumatic.      Nose: Nose normal. No congestion.      Mouth/Throat:      Mouth: Mucous membranes are moist.      Pharynx: No oropharyngeal exudate.   Eyes:      General:         Right eye: No discharge.         Left eye: No discharge.      Extraocular Movements: Extraocular movements intact.      Pupils: Pupils are equal, round, and reactive to light.   Cardiovascular:      Rate and Rhythm: Normal rate and regular rhythm.      Heart sounds: No murmur heard.    No friction rub. No gallop.   Pulmonary:      Effort: Pulmonary effort is normal. No respiratory distress.      Breath sounds: Normal breath sounds. No wheezing, rhonchi or rales.   Abdominal:      General: Abdomen is flat. Bowel sounds are normal. There is no distension.      Palpations: Abdomen is soft.       Tenderness: There is no abdominal tenderness.   Musculoskeletal:         General: No swelling. Normal range of motion.      Cervical back: Normal range of motion. No rigidity.      Right lower leg: No edema.      Left lower leg: No edema.   Skin:     General: Skin is warm and dry.      Findings: No lesion or rash.   Neurological:      General: No focal deficit present.      Mental Status: He is alert and oriented to person, place, and time.      Cranial Nerves: No cranial nerve deficit.      Motor: No weakness.       Significant Labs:  CBC:  Recent Labs   Lab 10/08/22  0547 10/09/22  0537 10/10/22  0556   WBC 7.97 8.40 8.02   RBC 3.03* 3.10* 3.11*   HGB 8.4* 8.7* 8.6*   HCT 26.8* 27.1* 27.2*    262 279   MCV 88 87 88   MCH 27.7 28.1 27.7   MCHC 31.3* 32.1 31.6*       BNP:  No results for input(s): BNP in the last 168 hours.    Invalid input(s): BNPTRIAGELBLO  CMP:  Recent Labs   Lab 10/08/22  0547 10/09/22  0537 10/10/22  0556   GLU 92 98 96   CALCIUM 9.1 9.0 8.9    135* 139   K 4.1 4.1 3.7   CO2 25 23 28    99 99   BUN 33* 33* 28*   CREATININE 1.7* 1.7* 1.7*        Coagulation:   Recent Labs   Lab 10/08/22  0547 10/09/22  0537 10/10/22  0556   APTT 49.4* 43.3* 50.5*       LDH:  No results for input(s): LDH in the last 72 hours.  Microbiology:  Microbiology Results (last 7 days)       Procedure Component Value Units Date/Time    Blood culture [[id number]] Collected: 09/30/22 0241    Order Status: Completed Specimen: Blood from Peripheral, Hand, Right Updated: 10/05/22 0612     Blood Culture, Routine No growth after 5 days.    Blood culture [[id number]] Collected: 09/30/22 0243    Order Status: Completed Specimen: Blood from Peripheral, Antecubital, Right Updated: 10/05/22 0612     Blood Culture, Routine No growth after 5 days.    Culture, Anaerobe [[id number]] Collected: 09/27/22 1026    Order Status: Completed Specimen: Abscess from Heart Updated: 10/04/22 0933     Anaerobic Culture No anaerobes  isolated    Narrative:      LV Lead Tip    Culture, Anaerobe [[id number]] Collected: 09/27/22 1133    Order Status: Completed Specimen: Abscess from Heart Updated: 10/04/22 0933     Anaerobic Culture No anaerobes isolated    Narrative:      RA Lead Tip    Culture, Anaerobe [[id number]] Collected: 09/27/22 1032    Order Status: Completed Specimen: Abscess from Heart Updated: 10/04/22 0932     Anaerobic Culture No anaerobes isolated    Narrative:      RA Lead drainage #1    Culture, Anaerobe [[id number]] Collected: 09/27/22 1035    Order Status: Completed Specimen: Abscess from Heart Updated: 10/04/22 0932     Anaerobic Culture No anaerobes isolated    Narrative:      RA Lead drainage #2    Culture, Anaerobe [[id number]] Collected: 09/27/22 0947    Order Status: Completed Specimen: Abscess from Chest, Left Updated: 10/04/22 0932     Anaerobic Culture No anaerobes isolated    Narrative:      Deep Pocket #1    Culture, Anaerobe [[id number]] Collected: 09/27/22 0952    Order Status: Completed Specimen: Abscess from Chest, Left Updated: 10/04/22 0932     Anaerobic Culture No anaerobes isolated    Narrative:      Deep Pocket #2    Culture, Anaerobe [[id number]] Collected: 09/27/22 0936    Order Status: Completed Specimen: Abscess from Chest, Left Updated: 10/04/22 0932     Anaerobic Culture No anaerobes isolated    Narrative:      Shallow Pocket            BMP:   Recent Labs   Lab 10/10/22  0556   GLU 96      K 3.7   CL 99   CO2 28   BUN 28*   CREATININE 1.7*   CALCIUM 8.9   MG 1.7       I have reviewed all pertinent labs within the past 24 hours.    Estimated Creatinine Clearance: 43.6 mL/min (A) (based on SCr of 1.7 mg/dL (H)).    Diagnostic Results:  Reviewed     Assessment and Plan:   73 yo WM being worked up for LVAD since hospitalization January 2022 for recurrent VT (he thinks had MI) and cardiogenic shock at [facility]. He was  later seen in [facility] where he was treated for cardiogenic shock and sent home on .  He  remains on  and is pursuing evaluation for LVAD.     His case was discussed at selection committee and he was deferred pending evaluation of pancreatic mass.  They wish to proceed with MRI but not sure with his ICD if this will be possible.Presented with MRSA bacteremia on admission.   HARRY 9/21/22: Not concerning for Vegetations. BCx negative 9/18.      9/27 CRT generator and lead extraction, pocket cx => hypotensive post-procedure requiring Epi, ICU  9/28 off Epi, transfer to CSU  9/29 sudden hypoxemic respiratory failure requiring urgent intubation after sats dropped while laying flat for PICC line => tx ICU, panculture       Acute Hypoxic respiratory failure 9/29/22 -resolved  -suspect aspiration event. Completed 5 day course with zosyn 10/4/22  -intubated and sedated 9/29, extubated 10/1/22  -Saturating well on room air.   -step down to floor 10/6.     * Chronic combined systolic and diastolic congestive heart failure  - On Dobutamine 5( increased from his baseline of 2.5 last night due to low MVO2).   -will recheck Lactic acid and MVO2. ( please do not uptitrate  just based on MVO2 as his MVO2 is very fluctuant). He gets symptomatic with dizziness, headaches, abd discomfort with  5. Will recheck hemodynamics and bloodwork and decide on .   - CVP 11. Restarted Po lsix 40 BID 10/8 .  Will continue the same. Cr at baseline 1.7.   - Daily weights, Strict I/Os  - Monitor electrolytes and Maintain Mg >2 and K >4  -Telemetry monitoring  -will put in a rt arm PICC today and most likely d/c tomorrow if stable.      Pancreatic lesion  - Gastroenterology on board  - EUS with biopsy  10/4, cytology results -negative for malignant cells, c/w cyst contents.      Bacteremia due to methicillin resistant Staphylococcus aureus  - Monitor WBC count and fever curve, pan-culture if patient spikes  - ID consult and recommendations are appreciated  - On Dapto currently end date 11/11 per ID ( 6 wks after device removal  9/27) for the MRSA bacteremia 9/14, 9/15, 9/16 positive for MRSA. 9/18 NG  - completed 5day  Course of zosyn 10/4/22 for aspiration pneumonia.   -9/30 BCX negative , resp cultures 10/1 NG     H/O ventricular tachycardia  - Amiodarone 300 mg, daily  - Monitor LFTs.         Coronary artery disease involving native coronary artery of native heart without angina pectoris  - Asprin 81 mg, daily  - Atorvastatin 80 mg, nightl     Left Brachial Vein Thrombosis 9/21  - on Heparin . Will consider transitioning to eliquis on d/c     Acute on Chronic Gout  -2nd recurrence 10/8  while in the hospital.( 1st recurrence treated with 5 day course Pred completed 10/6).   -Treating with colchicine 0.6 mg OD. Cr. 1.7, gfr 42  -On allopurinol 200 mg       Benton Rendon MD  Heart Transplant  [facility] - Cardiology Stepdown

## 2022-10-10 NOTE — PROGRESS NOTES
Update - Discharge Planning     Per HTS rounds Pt will likely go home tomorrow 10/11/22 with  5 a change from  2.5 and IV ABX. LCSW informed Marixa at [facility] ([phone]; fax [phone]) , Pt utilizes [facility]. Pt got PICC placed today, no PT/OT needs at this time. SW providing ongoing psychosocial, counseling, & emotional support, education, resources, assistance, and discharge planning as indicated.  SW to continue to follow.

## 2022-10-10 NOTE — SUBJECTIVE & OBJECTIVE
Interval History: CVP 11 this am . MVO2 was 36 last night and night team went up on  from 2.5( baseline) ---5. Resumed Po lasix 40 BID 10/8 . Cr stable at 1.7.  off pressors since 10/2. stepdown to floor 10/6/22.     Will get a rt arm PICC today and most likely d/ on dobutamine if stable tomorrow.         Continuous Infusions:   sodium chloride 0.9% Stopped (09/28/22 1550)    sodium chloride 0.9% 5 mL/hr at 10/06/22 0105    DOBUTamine IV infusion (non-titrating) 5 mcg/kg/min (10/10/22 0644)    heparin (porcine) in D5W 19 Units/kg/hr (10/10/22 0644)     Scheduled Meds:   acetaminophen  650 mg Oral QID    allopurinoL  200 mg Oral Daily    amiodarone  300 mg Oral Daily    aspirin  81 mg Oral Daily    atorvastatin  80 mg Oral Daily    colchicine  0.6 mg Oral Daily    DAPTOmycin (CUBICIN) IV (PEDS and ADULTS)  10 mg/kg Intravenous Q24H    famotidine  20 mg Oral Daily    furosemide  40 mg Oral BID    LIDOcaine  1 patch Transdermal Q24H    LIDOcaine HCl 2%  15 mL Oral Once    olopatadine  1 drop Both Eyes Daily    polyethylene glycol  17 g Oral Daily    potassium chloride  40 mEq Oral Once    sucralfate  1 g Oral BID     PRN Meds:sodium chloride, acetaminophen, artificial tears, dextromethorphan-guaiFENesin  mg, heparin (PORCINE), heparin (PORCINE), HYDROcodone-acetaminophen, melatonin, methocarbamoL, ondansetron, senna-docusate 8.6-50 mg, sodium chloride 0.9%    Review of patient's allergies indicates:   Allergen Reactions    Iodine containing multivitamin Swelling     itching    Keflex [cephalexin] Swelling     Eyes.  Tolerated multiple doses of zosyn and 1 dose of augmentin in 2015 and 2016, respectively    Peaches [peach (prunus persica)] Swelling     eyes    Shellfish containing products Swelling    Fig tree Swelling     itching    Tuberculin spenser test ppd Rash     Objective:     Vital Signs (Most Recent):  Temp: 98.4 °F (36.9 °C) (10/10/22 1208)  Pulse: 96 (10/10/22 1208)  Resp: 19 (10/10/22 1208)  BP: (!)  107/58 (10/10/22 1208)  SpO2: (!) 91 % (10/10/22 1208)   Vital Signs (24h Range):  Temp:  [98.1 °F (36.7 °C)-98.4 °F (36.9 °C)] 98.4 °F (36.9 °C)  Pulse:  [] 96  Resp:  [17-20] 19  SpO2:  [90 %-100 %] 91 %  BP: ()/(52-68) 107/58     Patient Vitals for the past 72 hrs (Last 3 readings):   Weight   10/09/22 0820 91.5 kg (201 lb 12.8 oz)       Body mass index is 31.61 kg/m².      Intake/Output Summary (Last 24 hours) at 10/10/2022 1306  Last data filed at 10/10/2022 0644  Gross per 24 hour   Intake 486.4 ml   Output 950 ml   Net -463.6 ml           Physical Exam  Constitutional:       General: He is not in acute distress.     Appearance: Normal appearance. He is normal weight. He is not ill-appearing or toxic-appearing.   HENT:      Head: Normocephalic and atraumatic.      Nose: Nose normal. No congestion.      Mouth/Throat:      Mouth: Mucous membranes are moist.      Pharynx: No oropharyngeal exudate.   Eyes:      General:         Right eye: No discharge.         Left eye: No discharge.      Extraocular Movements: Extraocular movements intact.      Pupils: Pupils are equal, round, and reactive to light.   Cardiovascular:      Rate and Rhythm: Normal rate and regular rhythm.      Heart sounds: No murmur heard.    No friction rub. No gallop.   Pulmonary:      Effort: Pulmonary effort is normal. No respiratory distress.      Breath sounds: Normal breath sounds. No wheezing, rhonchi or rales.   Abdominal:      General: Abdomen is flat. Bowel sounds are normal. There is no distension.      Palpations: Abdomen is soft.      Tenderness: There is no abdominal tenderness.   Musculoskeletal:         General: No swelling. Normal range of motion.      Cervical back: Normal range of motion. No rigidity.      Right lower leg: No edema.      Left lower leg: No edema.   Skin:     General: Skin is warm and dry.      Findings: No lesion or rash.   Neurological:      General: No focal deficit present.      Mental Status: He  is alert and oriented to person, place, and time.      Cranial Nerves: No cranial nerve deficit.      Motor: No weakness.       Significant Labs:  CBC:  Recent Labs   Lab 10/08/22  0547 10/09/22  0537 10/10/22  0556   WBC 7.97 8.40 8.02   RBC 3.03* 3.10* 3.11*   HGB 8.4* 8.7* 8.6*   HCT 26.8* 27.1* 27.2*    262 279   MCV 88 87 88   MCH 27.7 28.1 27.7   MCHC 31.3* 32.1 31.6*       BNP:  No results for input(s): BNP in the last 168 hours.    Invalid input(s): BNPTRIAGELBLO  CMP:  Recent Labs   Lab 10/08/22  0547 10/09/22  0537 10/10/22  0556   GLU 92 98 96   CALCIUM 9.1 9.0 8.9    135* 139   K 4.1 4.1 3.7   CO2 25 23 28    99 99   BUN 33* 33* 28*   CREATININE 1.7* 1.7* 1.7*        Coagulation:   Recent Labs   Lab 10/08/22  0547 10/09/22  0537 10/10/22  0556   APTT 49.4* 43.3* 50.5*       LDH:  No results for input(s): LDH in the last 72 hours.  Microbiology:  Microbiology Results (last 7 days)       Procedure Component Value Units Date/Time    Blood culture [[id number]] Collected: 09/30/22 0241    Order Status: Completed Specimen: Blood from Peripheral, Hand, Right Updated: 10/05/22 0612     Blood Culture, Routine No growth after 5 days.    Blood culture [[id number]] Collected: 09/30/22 0243    Order Status: Completed Specimen: Blood from Peripheral, Antecubital, Right Updated: 10/05/22 0612     Blood Culture, Routine No growth after 5 days.    Culture, Anaerobe [[id number]] Collected: 09/27/22 1026    Order Status: Completed Specimen: Abscess from Heart Updated: 10/04/22 0933     Anaerobic Culture No anaerobes isolated    Narrative:      LV Lead Tip    Culture, Anaerobe [[id number]] Collected: 09/27/22 1133    Order Status: Completed Specimen: Abscess from Heart Updated: 10/04/22 0933     Anaerobic Culture No anaerobes isolated    Narrative:      RA Lead Tip    Culture, Anaerobe [[id number]] Collected: 09/27/22 1032    Order Status: Completed Specimen: Abscess from Heart Updated: 10/04/22 0932      Anaerobic Culture No anaerobes isolated    Narrative:      RA Lead drainage #1    Culture, Anaerobe [[id number]] Collected: 09/27/22 1035    Order Status: Completed Specimen: Abscess from Heart Updated: 10/04/22 0932     Anaerobic Culture No anaerobes isolated    Narrative:      RA Lead drainage #2    Culture, Anaerobe [[id number]] Collected: 09/27/22 0947    Order Status: Completed Specimen: Abscess from Chest, Left Updated: 10/04/22 0932     Anaerobic Culture No anaerobes isolated    Narrative:      Deep Pocket #1    Culture, Anaerobe [[id number]] Collected: 09/27/22 0952    Order Status: Completed Specimen: Abscess from Chest, Left Updated: 10/04/22 0932     Anaerobic Culture No anaerobes isolated    Narrative:      Deep Pocket #2    Culture, Anaerobe [[id number]] Collected: 09/27/22 0936    Order Status: Completed Specimen: Abscess from Chest, Left Updated: 10/04/22 0932     Anaerobic Culture No anaerobes isolated    Narrative:      Shallow Pocket            BMP:   Recent Labs   Lab 10/10/22  0556   GLU 96      K 3.7   CL 99   CO2 28   BUN 28*   CREATININE 1.7*   CALCIUM 8.9   MG 1.7       I have reviewed all pertinent labs within the past 24 hours.    Estimated Creatinine Clearance: 43.6 mL/min (A) (based on SCr of 1.7 mg/dL (H)).    Diagnostic Results:  Reviewed

## 2022-10-10 NOTE — PT/OT/SLP PROGRESS
Occupational Therapy      Patient Name:  Audrey ARIEL Oneil Jr.   MRN:  [id number]    Patient not seen today secondary to pt out of room and moved for D/C planned for tomorrow .     10/10/2022

## 2022-10-10 NOTE — CARE UPDATE
Hemodynamics by [facility]na[employer] formula     CO 4.25  CI 2.05  CVP 12  SVR 1280     Increase dobutamine from 2.5 to 5 and monitor for arrhythmias. Discussed with staff on call.

## 2022-10-10 NOTE — BRIEF OP NOTE
Hemodynamics by Johnna's    CO 4.25  CI 2.05  CVP 12  SVR 1280    Increase dobutamine from 2.5 to 5 and monitor for arrhythmias. Discussed with staff on call.

## 2022-10-11 NOTE — PROGRESS NOTES
[facility] - Cardiology Stepdown  Adult Nutrition  Progress Note    SUMMARY       Recommendations  1. Continue Vegetarian diet-encourage adequate intake   2. Add Boost Breeze BID (peach flavor) for optimization of protein and calorie intake/better tolerance  3. RD following    Goals: Meet % of EEN/EPN by RD f/u date  Nutrition Goal Status: progressing towards goal  Communication of RD Recs: other (POC)    Assessment and Plan    Nutrition Problem:  Severe Protein-Calorie Malnutrition  Malnutrition in the context of Acute Illness/Injury    Related to (etiology):  Decreased ability to consume sufficient intake     Signs and Symptoms (as evidenced by):  Energy Intake: <50% of estimated energy requirement for > 1 week  Body Fat Depletion: moderate depletion of orbitals and triceps   Muscle Mass Depletion: moderate depletion of clavicle region, scapular region, and lower extremities   Weight Loss: 7% x 2 months      Interventions(treatment strategy):  Collaboration of nutrition care w/ other providers    Nutrition Diagnosis Status:  New     Reason for Assessment    Reason For Assessment: RD follow-up  Diagnosis: cardiac disease  Relevant Medical History: HTN, CHF, gout  Interdisciplinary Rounds: did not attend  General Information Comments: Spoke w/ pt at bedside, reports a decreased appetite at this time x1 week. Reports consuming bites of foods for some meals provided. Per RN documentation, pt consuming 25% meal consumption. Pt reports not tolerating Boost at this time, other alternatives provided for pt to optimize protein and calorie intake at this time- pt agreeable to trying at this time. Per chart review, noted significant weight loss of 7% x 2 months.  Pt meets criteria for severe protein malnutrition in the context of acute illness per ASPEN guidelines.  Nutrition Discharge Planning: cardiac diet    Nutrition Risk Screen    Nutrition Risk Screen: no indicators present    Nutrition/Diet History    Spiritual,  "Cultural Beliefs, Confucianism Practices, Values that Affect Care: no    Anthropometrics    Temp: 97.4 °F (36.3 °C)  Height Method: Stated  Height: 5' 7" (170.2 cm)  Height (inches): 67 in  Weight Method: Standard Scale  Weight: 90.8 kg (200 lb 2.8 oz)  Weight (lb): 200.18 lb  Ideal Body Weight (IBW), Male: 148 lb  % Ideal Body Weight, Male (lb): 140.54 %  BMI (Calculated): 31.3       Lab/Procedures/Meds    Pertinent Labs Reviewed: reviewed  Pertinent Labs Comments: BUN 28, Cr 1.7, GFR 42.8, Glucose 122  Pertinent Medications Reviewed: reviewed  Pertinent Medications Comments: atorvastatin      Estimated/Assessed Needs    Weight Used For Calorie Calculations: 88.5 kg (195 lb 1.7 oz)  Energy Calorie Requirements (kcal): 1923  Energy Need Method: Kay-St Jeor (PAL 1.2)  Protein Requirements: 133 g (1.5 g/kg)  Weight Used For Protein Calculations: 88.5 kg (195 lb 1.7 oz)        RDA Method (mL): 1923       Evaluation of Received Nutrient/Fluid Intake    I/O: -2.4 L since 9/27/22  Energy Calories Required: not meeting needs  Protein Required: not meeting needs  Fluid Required: not meeting needs  Total Fluid Intake (mL/kg): 1 ml or fluid per MD  Tolerance: tolerating  % Intake of Estimated Energy Needs: 0 - 25 %  % Meal Intake: 0 - 25 %    Nutrition Risk    Level of Risk/Frequency of Follow-up: low ((1 x/week))     Monitor and Evaluation    Food and Nutrient Intake: food and beverage intake, energy intake  Food and Nutrient Adminstration: diet order  Knowledge/Beliefs/Attitudes: food and nutrition knowledge/skill, beliefs and attitudes  Physical Activity and Function: nutrition-related ADLs and IADLs, factors affecting access to physical activity  Anthropometric Measurements: height/length, weight, weight change, body mass index, growth pattern indices/percentile ranks  Biochemical Data, Medical Tests and Procedures: electrolyte and renal panel, gastrointestinal profile, glucose/endocrine profile, inflammatory profile, " lipid profile  Nutrition-Focused Physical Findings: overall appearance, extremities, muscles and bones, head and eyes, skin     Nutrition Follow-Up    RD Follow-up?: Yes

## 2022-10-11 NOTE — PLAN OF CARE
Calm and cooperative, plan of care discussed and questions answered. Vitals stable in  on 2.5liters nasal cannula. Dobutamine gtt titrated down to 2.5mcg  3.7ml/hr. Continued heparin gtt, has  been therapeutic to changes to rate. Patient remained free of  falls  and injury. Comfort and safety maintained. Continue to monitor.   Problem: Skin Injury Risk Increased  Goal: Skin Health and Integrity  Outcome: Ongoing, Progressing     Problem: Adult Inpatient Plan of Care  Goal: Plan of Care Review  Outcome: Ongoing, Progressing  Goal: Patient-Specific Goal (Individualized)  Outcome: Ongoing, Progressing  Goal: Absence of Hospital-Acquired Illness or Injury  Outcome: Ongoing, Progressing  Goal: Optimal Comfort and Wellbeing  Outcome: Ongoing, Progressing  Goal: Readiness for Transition of Care  Outcome: Ongoing, Progressing     Problem: Cardiac Output Decreased (Heart Failure)  Goal: Optimal Cardiac Output  Outcome: Ongoing, Progressing     Problem: Dysrhythmia (Heart Failure)  Goal: Stable Heart Rate and Rhythm  Outcome: Ongoing, Progressing     Problem: Fluid Imbalance (Heart Failure)  Goal: Fluid Balance  Outcome: Ongoing, Progressing     Problem: Functional Ability Impaired (Heart Failure)  Goal: Optimal Functional Ability  Outcome: Ongoing, Progressing

## 2022-10-11 NOTE — SUBJECTIVE & OBJECTIVE
Interval History: NAEON. Complaints of generalized abdominal  discomfort with exaggerated bowel sounds, N//V, worse with food intake and unable to have po intake. on  from 2.5( baseline). Discontinued PO diuretics yesterday. Slight rise in cr 1.8(1.4-1.7).        Will get KUB abdomen and amylase, lipase. Will continue to hold diuretics.   Plan for tunneled catheter insertion tomorrow for plan to d/c on  and ABX, as PICC not feasible due to complications. NPO after midnight.         Continuous Infusions:   sodium chloride 0.9% Stopped (09/28/22 1550)    sodium chloride 0.9% 5 mL/hr at 10/06/22 0105    DOBUTamine IV infusion (non-titrating) 2.5 mcg/kg/min (10/11/22 0217)    heparin (porcine) in D5W 19 Units/kg/hr (10/10/22 2203)     Scheduled Meds:   acetaminophen  650 mg Oral QID    allopurinoL  200 mg Oral Daily    amiodarone  300 mg Oral Daily    aspirin  81 mg Oral Daily    atorvastatin  80 mg Oral QHS    colchicine  0.6 mg Oral Daily    DAPTOmycin (CUBICIN) IV (PEDS and ADULTS)  10 mg/kg Intravenous Q24H    famotidine  20 mg Oral Daily    LIDOcaine  1 patch Transdermal Q24H    LIDOcaine HCl 2%  15 mL Oral Once    olopatadine  1 drop Both Eyes Daily    polyethylene glycol  17 g Oral Daily    potassium chloride  40 mEq Oral Once    sucralfate  1 g Oral BID     PRN Meds:sodium chloride, acetaminophen, artificial tears, dextromethorphan-guaiFENesin  mg, heparin (PORCINE), heparin (PORCINE), HYDROcodone-acetaminophen, melatonin, methocarbamoL, ondansetron, senna-docusate 8.6-50 mg, sodium chloride 0.9%    Review of patient's allergies indicates:   Allergen Reactions    Iodine containing multivitamin Swelling     itching    Keflex [cephalexin] Swelling     Eyes.  Tolerated multiple doses of zosyn and 1 dose of augmentin in 2015 and 2016, respectively    Peaches [peach (prunus persica)] Swelling     eyes    Shellfish containing products Swelling    Fig tree Swelling     itching    Tuberculin spenser test ppd  Rash     Objective:     Vital Signs (Most Recent):  Temp: 98.5 °F (36.9 °C) (10/11/22 0829)  Pulse: 90 (10/11/22 0840)  Resp: 18 (10/11/22 0829)  BP: 112/66 (10/11/22 0829)  SpO2: (!) 94 % (10/11/22 0900)   Vital Signs (24h Range):  Temp:  [97.6 °F (36.4 °C)-98.5 °F (36.9 °C)] 98.5 °F (36.9 °C)  Pulse:  [] 90  Resp:  [18-20] 18  SpO2:  [91 %-96 %] 94 %  BP: ()/(55-68) 112/66     Patient Vitals for the past 72 hrs (Last 3 readings):   Weight   10/11/22 0804 90.8 kg (200 lb 2.8 oz)   10/09/22 0820 91.5 kg (201 lb 12.8 oz)       Body mass index is 31.35 kg/m².      Intake/Output Summary (Last 24 hours) at 10/11/2022 1007  Last data filed at 10/11/2022 0515  Gross per 24 hour   Intake 420 ml   Output 475 ml   Net -55 ml           Physical Exam  Constitutional:       General: He is not in acute distress.     Appearance: Normal appearance. He is normal weight. He is not ill-appearing or toxic-appearing.   HENT:      Head: Normocephalic and atraumatic.      Nose: Nose normal. No congestion.      Mouth/Throat:      Mouth: Mucous membranes are moist.      Pharynx: No oropharyngeal exudate.   Eyes:      General:         Right eye: No discharge.         Left eye: No discharge.      Extraocular Movements: Extraocular movements intact.      Pupils: Pupils are equal, round, and reactive to light.   Cardiovascular:      Rate and Rhythm: Normal rate and regular rhythm.      Heart sounds: No murmur heard.    No friction rub. No gallop.   Pulmonary:      Effort: Pulmonary effort is normal. No respiratory distress.      Breath sounds: Normal breath sounds. No wheezing, rhonchi or rales.   Abdominal:      General: Abdomen is flat. Bowel sounds are normal. There is no distension.      Palpations: Abdomen is soft.      Tenderness: There is no abdominal tenderness.   Musculoskeletal:         General: No swelling. Normal range of motion.      Cervical back: Normal range of motion. No rigidity.      Right lower leg: No edema.       Left lower leg: No edema.   Skin:     General: Skin is warm and dry.      Findings: No lesion or rash.   Neurological:      General: No focal deficit present.      Mental Status: He is alert and oriented to person, place, and time.      Cranial Nerves: No cranial nerve deficit.      Motor: No weakness.       Significant Labs:  CBC:  Recent Labs   Lab 10/09/22  0537 10/10/22  0556 10/11/22  0500   WBC 8.40 8.02 8.70   RBC 3.10* 3.11* 3.13*   HGB 8.7* 8.6* 8.6*   HCT 27.1* 27.2* 27.6*    279 282   MCV 87 88 88   MCH 28.1 27.7 27.5   MCHC 32.1 31.6* 31.2*       BNP:  No results for input(s): BNP in the last 168 hours.    Invalid input(s): BNPTRIAGELBLO  CMP:  Recent Labs   Lab 10/09/22  0537 10/10/22  0556 10/10/22  1334 10/11/22  0500   GLU 98 96  --  91   CALCIUM 9.0 8.9  --  8.9   ALBUMIN  --   --  2.8*  --    PROT  --   --  6.5  --    * 139  --  136   K 4.1 3.7  --  4.0   CO2 23 28  --  30*   CL 99 99  --  97   BUN 33* 28*  --  26*   CREATININE 1.7* 1.7*  --  1.8*   ALKPHOS  --   --  86  --    ALT  --   --  24  --    AST  --   --  20  --    BILITOT  --   --  0.6  --         Coagulation:   Recent Labs   Lab 10/09/22  0537 10/10/22  0556 10/11/22  0500   APTT 43.3* 50.5* 50.7*       LDH:  No results for input(s): LDH in the last 72 hours.  Microbiology:  Microbiology Results (last 7 days)       Procedure Component Value Units Date/Time    Blood culture [[id number]] Collected: 09/30/22 0241    Order Status: Completed Specimen: Blood from Peripheral, Hand, Right Updated: 10/05/22 0612     Blood Culture, Routine No growth after 5 days.    Blood culture [[id number]] Collected: 09/30/22 0243    Order Status: Completed Specimen: Blood from Peripheral, Antecubital, Right Updated: 10/05/22 0612     Blood Culture, Routine No growth after 5 days.            BMP:   Recent Labs   Lab 10/11/22  0500   GLU 91      K 4.0   CL 97   CO2 30*   BUN 26*   CREATININE 1.8*   CALCIUM 8.9   MG 2.2       I have reviewed  all pertinent labs within the past 24 hours.    Estimated Creatinine Clearance: 41 mL/min (A) (based on SCr of 1.8 mg/dL (H)).    Diagnostic Results:  Reviewed

## 2022-10-11 NOTE — H&P
Inpatient Radiology Pre-procedure Note    History of Present Illness:  Audrey Oneil Jr. is a 72 y.o. male PMH ICM, recurrent VT admitted recently for cardiogenic shock and is on home Dobutamine 2.5 mcg/kg/min undergoing workup for LVAD now admitted 9/14 for ADHF and found to have MRSA will complete Daptomycin course today.     VSS. Heparin gtt -aPTT 50.7 (10/11)       IR consulted for tunneled line placement for dobutamine drip.         Admission H&P reviewed.  Past Medical History:   Diagnosis Date    Acute hypoxemic respiratory failure 11/7/2015    Acute idiopathic gout of left knee 12/2/2019    Acute idiopathic gout of right elbow 9/23/2021    AICD (automatic cardioverter/defibrillator) present 12/13/2015      Anticoagulant long-term use     Bronchopneumonia 12/20/2021    CHF (congestive heart failure)     Chronic anticoagulation 5/5/2016    Chronic combined systolic and diastolic heart failure 11/26/2012    EF 10-20% on ECHO 2013    Chronic gout 12/2/2019    Clotting disorder     Coronary artery disease involving native coronary artery of native heart without angina pectoris 11/26/2012    Cath 10- Stents patent non-obstructive disease Cath 11-12015 non-obstructive disease     COVID-19 08/2021    Had antibody infusion    Diverticulosis of colon     DVT (deep venous thrombosis), unspecified laterality 11/12/2015    Dysphonia 1/28/2018    Essential hypertension 11/15/2015    Fine motor impairment 2/2/2021    Hyperlipidemia     Hypertensive heart disease with heart failure 5/5/2016    MI (myocardial infarction) 2009    x's 5    Nicotine abuse     Obesity 11/26/2012    Olecranon bursitis of right elbow 9/19/2021    Pulmonary embolism 2011    Renal disorder     LAVERNE    Right carpal tunnel syndrome 4/6/2018    Stented coronary artery 11/26/2012    LAD stent placed 10/17/2007     Trigger thumb of right hand 4/6/2018     Past Surgical History:   Procedure Laterality Date    APPENDECTOMY      BACK  SURGERY      CARDIAC DEFIBRILLATOR PLACEMENT      CARDIAC SURGERY  2007    stent    CARPAL TUNNEL RELEASE Right 04/2018    COLONOSCOPY N/A 8/8/2022    Procedure: COLONOSCOPY;  Surgeon: Sascha Keenan MD;  Location: [facility] ENDO (2ND FLR);  Service: Endoscopy;  Laterality: N/A;  EF-15%  pre heart    AICD-St Rodri       COVID test [facility] [place] 8/5-inst mail-am prep-clears 4 hrs prior-tb    ENDOSCOPIC ULTRASOUND OF UPPER GASTROINTESTINAL TRACT N/A 10/4/2022    Procedure: ULTRASOUND, UPPER GI TRACT, ENDOSCOPIC;  Surgeon: Charlie Smith MD;  Location: [facility] ENDO (2ND FLR);  Service: Endoscopy;  Laterality: N/A;    INSERTION OF BIVENTRICULAR IMPLANTABLE CARDIOVERTER-DEFIBRILLATOR (ICD) N/A 5/3/2019    Procedure: INSERTION, ICD, BIVENTRICULAR;  Surgeon: Teofilo Pal MD;  Location: [facility] EP LAB;  Service: Cardiology;  Laterality: N/A;  ICH CM,  ICD UPGD BI-V,  SJM, MAC, FAS, 3PREP (dual ICD INSITU)    r knee scope      REVISION OF SKIN POCKET FOR CARDIOVERTER-DEFIBRILLATOR Left 5/3/2019    Procedure: Revision, Skin Pocket, For Cardioverter-Defibrillator;  Surgeon: Teofilo Pal MD;  Location: [facility] EP LAB;  Service: Cardiology;  Laterality: Left;    RIGHT HEART CATHETERIZATION Right 6/14/2021    Procedure: INSERTION, CATHETER, RIGHT HEART;  Surgeon: Lizz Nieto MD;  Location: [facility] CATH LAB;  Service: Cardiology;  Laterality: Right;    RIGHT HEART CATHETERIZATION Right 6/17/2022    Procedure: INSERTION, CATHETER, RIGHT HEART;  Surgeon: Migue Henry Jr., MD;  Location: [facility] CATH LAB;  Service: Cardiology;  Laterality: Right;    SPINE SURGERY      TONSILLECTOMY      TRIGGER FINGER RELEASE Right 04/2018    thumb       Review of Systems:   As documented in primary team H&P    Home Meds:   Prior to Admission medications    Medication Sig Start Date End Date Taking? Authorizing Provider   allopurinoL (ZYLOPRIM) 100 MG tablet Take 2 tablets (200 mg total) by mouth once daily. 6/23/22   Venkatesh Love PA-C   amiodarone  (PACERONE) 200 MG Tab TAKE 1 AND 1/2 TABLETS(300 MG) BY MOUTH EVERY DAY 6/23/22   Venkatesh Love PA-C   aspirin (ECOTRIN) 81 MG EC tablet Take 1 tablet (81 mg total) by mouth once daily. 6/23/22 6/23/23  Venkatesh Love PA-C   atorvastatin (LIPITOR) 80 MG tablet Take 1 tablet (80 mg total) by mouth once daily. 6/23/22   Venkatesh Love PA-C   DOBUTamine (DOBUTREX) 500 mg/250 mL (2,000 mcg/mL) 2.5 mcg/kg/min  Patient is 95.7 kg 6/23/22   Venkatesh Love PA-C   furosemide (LASIX) 40 MG tablet Take 1 tablet (40 mg total) by mouth daily as needed (Weight gain of 3 lbs in one day or 5 lbs in one week). 6/23/22 6/23/23  Venkatesh Love PA-C   HYDROcodone-acetaminophen (NORCO)  mg per tablet Take 1 tablet by mouth every 6 (six) hours. 7/7/22   Historical Provider   JARDIANCE 25 mg tablet Take 1 tablet (25 mg total) by mouth once daily. 8/25/22   Migue Henry Jr., MD   ondansetron (ZOFRAN-ODT) 4 MG TbDL Dissolve 1 tablet (4 mg total) by mouth every 6 (six) hours as needed (nausea). 6/23/22   Venkatesh Love PA-C   polyethylene glycol (GOLYTELY) 236-22.74-6.74 -5.86 gram suspension SMARTSIG:Milliliter(s) By Mouth 8/1/22   Historical Provider   sacubitriL-valsartan (ENTRESTO) 49-51 mg per tablet Take 1 tablet by mouth 2 (two) times daily. 6/23/22   Venkatesh Love PA-C   simethicone (MYLICON) 80 MG chewable tablet Take 1 tablet (80 mg total) by mouth every 6 (six) hours as needed for Flatulence. 6/23/22   Venkatesh Love PA-C   spironolactone (ALDACTONE) 25 MG tablet Take 1 tablet (25 mg total) by mouth once daily. 6/23/22   Venkatesh Love PA-C   clopidogreL (PLAVIX) 75 mg tablet Take 1 tablet (75 mg total) by mouth once daily.  Patient not taking: No sig reported 2/4/21 4/1/22  Chuck Sams II, MD   colchicine (COLCRYS) 0.6 mg tablet Take 1 tablet (0.6 mg total) by mouth once daily.  Patient not taking: Reported on 3/18/2022 12/20/21 4/1/22  Jaxson Santo MD   ipratropium (ATROVENT) 0.02 % nebulizer solution Take 2.5 mLs  (500 mcg total) by nebulization 4 (four) times daily as needed for Wheezing. Rescue  Patient not taking: Reported on 3/29/2022 12/15/21 4/1/22  Horacio Laughlin MD   metoprolol succinate (TOPROL-XL) 25 MG 24 hr tablet Take 1 tablet (25 mg total) by mouth once daily. 4/1/22 4/8/22  Uma Dupree MD   midodrine (PROAMATINE) 2.5 MG Tab Take 1 tablet (2.5 mg total) by mouth 3 (three) times daily. HOLD until follow up with cardiology 4/1/22 4/8/22  Uma Dupree MD     Scheduled Meds:    acetaminophen  650 mg Oral QID    allopurinoL  200 mg Oral Daily    amiodarone  300 mg Oral Daily    aspirin  81 mg Oral Daily    atorvastatin  80 mg Oral QHS    colchicine  0.6 mg Oral Daily    DAPTOmycin (CUBICIN) IV (PEDS and ADULTS)  10 mg/kg Intravenous Q24H    famotidine  20 mg Oral Daily    LIDOcaine  1 patch Transdermal Q24H    LIDOcaine HCl 2%  15 mL Oral Once    olopatadine  1 drop Both Eyes Daily    polyethylene glycol  17 g Oral Daily    potassium chloride  40 mEq Oral Once    sucralfate  1 g Oral BID     Continuous Infusions:    sodium chloride 0.9% Stopped (09/28/22 1550)    sodium chloride 0.9% 5 mL/hr at 10/06/22 0105    DOBUTamine IV infusion (non-titrating) 2.5 mcg/kg/min (10/11/22 0217)    heparin (porcine) in D5W 19 Units/kg/hr (10/10/22 2203)     PRN Meds:sodium chloride, acetaminophen, artificial tears, dextromethorphan-guaiFENesin  mg, heparin (PORCINE), heparin (PORCINE), HYDROcodone-acetaminophen, melatonin, methocarbamoL, ondansetron, senna-docusate 8.6-50 mg, sodium chloride 0.9%  Anticoagulants/Antiplatelets: Heparin gtt    Allergies:   Review of patient's allergies indicates:   Allergen Reactions    Iodine containing multivitamin Swelling     itching    Keflex [cephalexin] Swelling     Eyes.  Tolerated multiple doses of zosyn and 1 dose of augmentin in 2015 and 2016, respectively    Peaches [peach (prunus persica)] Swelling     eyes    Shellfish containing products Swelling    Fig tree Swelling      itching    Tuberculin spenser test ppd Rash     Sedation Hx: have not been any systemic reactions    Labs:  No results for input(s): INR in the last 168 hours.    Invalid input(s):  PT,  PTT    Recent Labs   Lab 10/11/22  0500   WBC 8.70   HGB 8.6*   HCT 27.6*   MCV 88         Recent Labs   Lab 10/10/22  1334 10/11/22  0500   GLU  --  91   NA  --  136   K  --  4.0   CL  --  97   CO2  --  30*   BUN  --  26*   CREATININE  --  1.8*   CALCIUM  --  8.9   MG  --  2.2   ALT 24  --    AST 20  --    ALBUMIN 2.8*  --    BILITOT 0.6  --    BILIDIR 0.3  --          Vitals:  Temp: 98.5 °F (36.9 °C) (10/11/22 0829)  Pulse: 90 (10/11/22 0840)  Resp: 18 (10/11/22 0829)  BP: 112/66 (10/11/22 0829)  SpO2: (!) 94 % (10/11/22 0829)     Physical Exam:  ASA: 3  Mallampati: 3    General: no acute distress  Mental Status: alert and oriented to person, place and time  HEENT: normocephalic, atraumatic  Chest: unlabored breathing  Abdomen: nondistended  Extremity: moves all extremities    Plan:     Tunneled central line placement tentatively scheduled for 10/12/22.   Sedation Plan: up to moderate.  NPO at midnight       Monica Romero MD  Radiology PGY IV

## 2022-10-11 NOTE — PLAN OF CARE
Recommendations  1. Continue Vegetarian diet-encourage adequate intake   2. Add Boost Breeze BID (peach flavor) for optimization of protein and calorie intake/better tolerance  3. RD following     Goals: Meet % of EEN/EPN by RD f/u date  Nutrition Goal Status: progressing towards goal  Communication of RD Recs: other (POC)

## 2022-10-11 NOTE — PT/OT/SLP PROGRESS
Occupational Therapy      Patient Name:  Audrey GEORGE Thad Mccarthy   MRN:  [id number]    Patient not seen today secondary to with MD for sterile dressing change on first attempt. Pt declining on second attempt 2* N/V when moving  . Will follow-up 10/12/22.    10/11/2022

## 2022-10-11 NOTE — CONSULTS
Unable to get PICC placed previously on 9/29, unable to get PICC to go central. MD notified via secure chat

## 2022-10-11 NOTE — PLAN OF CARE
Patient is resting in bed at this time. He complains of abdominal pain 7/10. Patient reports having a large bowel movement this morning. Dobutamine and heparin still running at this time. Safety measures in place and call bell within reach.    Patient left unit for abd xray.    Problem: Skin Injury Risk Increased  Goal: Skin Health and Integrity  Outcome: Ongoing, Progressing     Problem: Pain Acute  Goal: Acceptable Pain Control and Functional Ability  Outcome: Ongoing, Progressing     Problem: Adult Inpatient Plan of Care  Goal: Plan of Care Review  Outcome: Ongoing, Progressing  Goal: Patient-Specific Goal (Individualized)  Outcome: Ongoing, Progressing  Goal: Absence of Hospital-Acquired Illness or Injury  Outcome: Ongoing, Progressing  Goal: Optimal Comfort and Wellbeing  Outcome: Ongoing, Progressing  Goal: Readiness for Transition of Care  Outcome: Ongoing, Progressing     Problem: Cardiac Output Decreased (Heart Failure)  Goal: Optimal Cardiac Output  Outcome: Ongoing, Progressing     Problem: Dysrhythmia (Heart Failure)  Goal: Stable Heart Rate and Rhythm  Outcome: Ongoing, Progressing     Problem: Fluid Imbalance (Heart Failure)  Goal: Fluid Balance  Outcome: Ongoing, Progressing     Problem: Functional Ability Impaired (Heart Failure)  Goal: Optimal Functional Ability  Outcome: Ongoing, Progressing

## 2022-10-11 NOTE — PT/OT/SLP PROGRESS
Physical Therapy  Treatment    Patient Name:  Audrey Oneil Jr.   MRN:  [id number]    Recommendations:     Discharge Recommendations:  home   Discharge Equipment Recommendations: none   Barriers to discharge: decreased functional mobility, fall risk, and decreased caregiver support    Assessment:     Audrey Oneil Jr. is a 72 y.o. male admitted with a medical diagnosis of Acute on chronic combined systolic and diastolic heart failure.  Pt demonstrates the below listed impairments with decreased tolerance to functional mobility, pain, and balance instability being the most limiting.  Pt demonstrates fair tolerance to out of bed mobility.  Session postponed in AM due to pain with gout flareup however pain remains a limiting factor in patients bout of ambulation.  Pt requires skilled PT due to patient's status as: a fall risk and patient has increased pain to allow safe d/c home.     Impairments and functional limitations:  weakness, impaired endurance, impaired self care skills, impaired functional mobility, gait instability, impaired balance, impaired cognition, decreased lower extremity function, pain, decreased ROM, impaired cardiopulmonary response to activity.  These deficits affect their roles and responsibilities in which they were able to complete prior to admit.  Rehab Prognosis:   Good ; patient would benefit from acute skilled PT services 2 x/week to address these deficits, improve quality of life, focus on recovery of impairments, provide patient/caregiver education, reduce fall risk, and reach maximum level of function.  Pt is highly  motivated to participated in skilled PT.    Recent Surgery:   Procedure(s) (LRB):  ULTRASOUND, UPPER GI TRACT, ENDOSCOPIC (N/A) 7 Days Post-Op    Plan:     During this hospitalization, patient to be seen 2 x/week to address the identified rehab impairments via gait training, therapeutic activities, neuromuscular re-education, therapeutic exercises and progress toward the  "following goals:    Plan of Care Expires:  11/02/22    Subjective     Chief Complaint: "I'm sorry I couldn't walk further"  Patient/Family Comments/Goals: Return home  Pain/Comfort:  Pain Rating 1: other (see comments) (not rated)  Location - Side 1: Bilateral  Location - Orientation 1: generalized  Location 1: foot  Pain Addressed 1: Reposition, Distraction, Cessation of Activity  Pain Rating Post-Intervention 1: other (see comments) (not rated)    Objective:     Communicated with RN prior to session.  Patient found left sidelying  with telemetry, peripheral IV, oxygen upon PT entry to room.     General Precautions: Standard, fall   Orthopedic Precautions:N/A   Braces: N/A  Oxygen Device:      Functional Mobility:  Bed Mobility:  Rolling Left: Sup  Scooting: Sup  Supine to Sit: Sup  Head of bed position: HOB elevated    Transfers:  Sit to Stand: SBA with No AD    Gait: Patient ambulated 50' with IV pole and SBA. Patient demonstrates occasional unsteady gait, decreased step length, flexed posture, and decreased nidia. All lines remained intact throughout ambulation trial, mask donned for out of room ambulation.   Pt placed on RA for ambulation.  He has increased pain and has nausea which limits ambulation distance  SpO2 drops to 87%, recovers on 2L wall O2 within 1min    Balance:   Position Score Time   Static Sitting GOOD: Takes MODERATE challenges n/a   Dynamic Sitting GOOD-: Maintains balance through MODERATE excursions of active trunk motion, but inconstantly  n/a   Static Standing FAIR+: Takes MINIMAL challenges n/a   Dynamic Standing FAIR: Maintains without assist, but is unable to take any challenges n/a       AM-PAC 6 CLICK MOBILITY  Turning over in bed (including adjusting bedclothes, sheets and blankets)?: 3  Sitting down on and standing up from a chair with arms (e.g., wheelchair, bedside commode, etc.): 3  Moving from lying on back to sitting on the side of the bed?: 3  Moving to and from a bed to a " chair (including a wheelchair)?: 3  Need to walk in hospital room?: 3  Climbing 3-5 steps with a railing?: 3  Basic Mobility Total Score: 18     Therapeutic Activities:  Patient educated on role of acute care PT and PT POC, safety while in hospital including calling nurse for mobility, call light usage, benefits of out of bed mobility, breathing technique, fall risk, bed mobility , transfers, gait technique, positioning, posture, risks of prolonged bed rest, possible discharge disposition , and benefits of continued PT by explanation and demonstration.    Patient demonstrates good understanding of education provided this day.   Whiteboard updated  Pt with nausea at end of session.     Therapeutic Exercises:  n/a    Patient left sitting EOB with all lines intact, call button in reach, and RN notified.    GOALS:   Multidisciplinary Problems       Physical Therapy Goals          Problem: Physical Therapy    Goal Priority Disciplines Outcome Goal Variances Interventions   Physical Therapy Goal     PT, PT/OT Ongoing, Progressing     Description: Goals to be met by: 22    Patient will increase functional independence with mobility by performin. Sit to stand transfer with independence and maintaining sternal precautions- not met  2. Gait  x 300 feet with independence using LRAD as needed- not met  3. Ascend/descend 5 stair with bilateral handrails modified independence and maintaining sternal precautions using LRAD as needed- not met  4. Lower extremity exercise program x15 reps per handout, with independence- not met                         Time Tracking:     PT Received On: 10/11/22  PT Start Time: 1326     PT Stop Time: 1337  PT Total Time (min): 11 min     Billable Minutes: Gait Training 11    Treatment Type: Treatment  PT/PTA: PT     PTA Visit Number: 0     10/11/2022

## 2022-10-11 NOTE — PROGRESS NOTES
[facility] - Cardiology Stepdown  Heart Transplant  Progress Note    Patient Name: Audrey Oneil Jr.  MRN: [id number]  Admission Date: 9/14/2022  Hospital Length of Stay: 27 days  Attending Physician: Migue Henry Jr.,*  Primary Care Provider: Primary Doctor No  Principal Problem:Acute on chronic combined systolic and diastolic heart failure    Subjective:     Interval History: NAEON. Complaints of generalized abdominal  discomfort with exaggerated bowel sounds, N//V, worse with food intake and unable to have po intake. on  from 2.5( baseline). Discontinued PO diuretics yesterday. Slight rise in cr 1.8(1.4-1.7).        Will get KUB abdomen and amylase, lipase. Will continue to hold diuretics.   Plan for tunneled catheter insertion tomorrow for plan to d/c on  and ABX, as PICC not feasible due to complications. NPO after midnight.         Continuous Infusions:   sodium chloride 0.9% Stopped (09/28/22 1550)    sodium chloride 0.9% 5 mL/hr at 10/06/22 0105    DOBUTamine IV infusion (non-titrating) 2.5 mcg/kg/min (10/11/22 0217)    heparin (porcine) in D5W 19 Units/kg/hr (10/10/22 2203)     Scheduled Meds:   acetaminophen  650 mg Oral QID    allopurinoL  200 mg Oral Daily    amiodarone  300 mg Oral Daily    aspirin  81 mg Oral Daily    atorvastatin  80 mg Oral QHS    colchicine  0.6 mg Oral Daily    DAPTOmycin (CUBICIN) IV (PEDS and ADULTS)  10 mg/kg Intravenous Q24H    famotidine  20 mg Oral Daily    LIDOcaine  1 patch Transdermal Q24H    LIDOcaine HCl 2%  15 mL Oral Once    olopatadine  1 drop Both Eyes Daily    polyethylene glycol  17 g Oral Daily    potassium chloride  40 mEq Oral Once    sucralfate  1 g Oral BID     PRN Meds:sodium chloride, acetaminophen, artificial tears, dextromethorphan-guaiFENesin  mg, heparin (PORCINE), heparin (PORCINE), HYDROcodone-acetaminophen, melatonin, methocarbamoL, ondansetron, senna-docusate 8.6-50 mg, sodium chloride 0.9%    Review of patient's allergies indicates:    Allergen Reactions    Iodine containing multivitamin Swelling     itching    Keflex [cephalexin] Swelling     Eyes.  Tolerated multiple doses of zosyn and 1 dose of augmentin in 2015 and 2016, respectively    Peaches [peach (prunus persica)] Swelling     eyes    Shellfish containing products Swelling    Fig tree Swelling     itching    Tuberculin spenser test ppd Rash     Objective:     Vital Signs (Most Recent):  Temp: 98.5 °F (36.9 °C) (10/11/22 0829)  Pulse: 90 (10/11/22 0840)  Resp: 18 (10/11/22 0829)  BP: 112/66 (10/11/22 0829)  SpO2: (!) 94 % (10/11/22 0900)   Vital Signs (24h Range):  Temp:  [97.6 °F (36.4 °C)-98.5 °F (36.9 °C)] 98.5 °F (36.9 °C)  Pulse:  [] 90  Resp:  [18-20] 18  SpO2:  [91 %-96 %] 94 %  BP: ()/(55-68) 112/66     Patient Vitals for the past 72 hrs (Last 3 readings):   Weight   10/11/22 0804 90.8 kg (200 lb 2.8 oz)   10/09/22 0820 91.5 kg (201 lb 12.8 oz)       Body mass index is 31.35 kg/m².      Intake/Output Summary (Last 24 hours) at 10/11/2022 1007  Last data filed at 10/11/2022 0515  Gross per 24 hour   Intake 420 ml   Output 475 ml   Net -55 ml           Physical Exam  Constitutional:       General: He is not in acute distress.     Appearance: Normal appearance. He is normal weight. He is not ill-appearing or toxic-appearing.   HENT:      Head: Normocephalic and atraumatic.      Nose: Nose normal. No congestion.      Mouth/Throat:      Mouth: Mucous membranes are moist.      Pharynx: No oropharyngeal exudate.   Eyes:      General:         Right eye: No discharge.         Left eye: No discharge.      Extraocular Movements: Extraocular movements intact.      Pupils: Pupils are equal, round, and reactive to light.   Cardiovascular:      Rate and Rhythm: Normal rate and regular rhythm.      Heart sounds: No murmur heard.    No friction rub. No gallop.   Pulmonary:      Effort: Pulmonary effort is normal. No respiratory distress.      Breath sounds: Normal breath sounds. No  wheezing, rhonchi or rales.   Abdominal:      General:  Bowel sounds exaggerated. Generalized abd tenderness on palpation.      Palpations: Abdomen is soft.        Musculoskeletal:         General: No swelling. Normal range of motion.      Cervical back: Normal range of motion. No rigidity.      Right lower leg: No edema.      Left lower leg: No edema.   Skin:     General: Skin is warm and dry.      Findings: No lesion or rash.   Neurological:      General: No focal deficit present.      Mental Status: He is alert and oriented to person, place, and time.      Cranial Nerves: No cranial nerve deficit.      Motor: No weakness.       Significant Labs:  CBC:  Recent Labs   Lab 10/09/22  0537 10/10/22  0556 10/11/22  0500   WBC 8.40 8.02 8.70   RBC 3.10* 3.11* 3.13*   HGB 8.7* 8.6* 8.6*   HCT 27.1* 27.2* 27.6*    279 282   MCV 87 88 88   MCH 28.1 27.7 27.5   MCHC 32.1 31.6* 31.2*       BNP:  No results for input(s): BNP in the last 168 hours.    Invalid input(s): BNPTRIAGELBLO  CMP:  Recent Labs   Lab 10/09/22  0537 10/10/22  0556 10/10/22  1334 10/11/22  0500   GLU 98 96  --  91   CALCIUM 9.0 8.9  --  8.9   ALBUMIN  --   --  2.8*  --    PROT  --   --  6.5  --    * 139  --  136   K 4.1 3.7  --  4.0   CO2 23 28  --  30*   CL 99 99  --  97   BUN 33* 28*  --  26*   CREATININE 1.7* 1.7*  --  1.8*   ALKPHOS  --   --  86  --    ALT  --   --  24  --    AST  --   --  20  --    BILITOT  --   --  0.6  --         Coagulation:   Recent Labs   Lab 10/09/22  0537 10/10/22  0556 10/11/22  0500   APTT 43.3* 50.5* 50.7*       LDH:  No results for input(s): LDH in the last 72 hours.  Microbiology:  Microbiology Results (last 7 days)       Procedure Component Value Units Date/Time    Blood culture [[id number]] Collected: 09/30/22 0241    Order Status: Completed Specimen: Blood from Peripheral, Hand, Right Updated: 10/05/22 0612     Blood Culture, Routine No growth after 5 days.    Blood culture [[id number]] Collected: 09/30/22  0243    Order Status: Completed Specimen: Blood from Peripheral, Antecubital, Right Updated: 10/05/22 0612     Blood Culture, Routine No growth after 5 days.            BMP:   Recent Labs   Lab 10/11/22  0500   GLU 91      K 4.0   CL 97   CO2 30*   BUN 26*   CREATININE 1.8*   CALCIUM 8.9   MG 2.2       I have reviewed all pertinent labs within the past 24 hours.    Estimated Creatinine Clearance: 41 mL/min (A) (based on SCr of 1.8 mg/dL (H)).    Diagnostic Results:  Reviewed     Assessment and Plan:     73 yo WM being worked up for LVAD since hospitalization January 2022 for recurrent VT (he thinks had MI) and cardiogenic shock at [facility]. He was  later seen in [facility] where he was treated for cardiogenic shock and sent home on .  He remains on  and is pursuing evaluation for LVAD.     His case was discussed at selection committee and he was deferred pending evaluation of pancreatic mass.  They wish to proceed with MRI but not sure with his ICD if this will be possible.Presented with MRSA bacteremia on admission.   HARRY 9/21/22: Not concerning for Vegetations. BCx negative 9/18.      9/27 CRT generator and lead extraction, pocket cx => hypotensive post-procedure requiring Epi, ICU  9/28 off Epi, transfer to CSU  9/29 sudden hypoxemic respiratory failure requiring urgent intubation after sats dropped while laying flat for PICC line => tx ICU, panculture       Acute Hypoxic respiratory failure 9/29/22 -resolved  -suspect aspiration event. Completed 5 day course with zosyn 10/4/22  -intubated and sedated 9/29, extubated 10/1/22  -Saturating well on room air.   -step down to floor 10/6.     * Chronic combined systolic and diastolic congestive heart failure  - On Dobutamine 2.5(baseline)   - please do not uptitrate  just based on MVO2 as his MVO2 is very fluctuant). He gets symptomatic with dizziness, headaches, abd discomfort with  5.   - Diuretics on hold, slight elevation in cr.   - Daily weights, Strict  I/Os  - Monitor electrolytes and Maintain Mg >2 and K >4  -Telemetry monitoring  -Plan for tunneled cath insertion tomorrow and d/c on , abx .     Pancreatic lesion  - Gastroenterology on board  - EUS with biopsy  10/4, cytology results -negative for malignant cells, c/w cyst contents.      Bacteremia due to methicillin resistant Staphylococcus aureus  - Monitor WBC count and fever curve, pan-culture if patient spikes  - ID consult and recommendations are appreciated  - On Dapto currently end date 11/11 per ID ( 6 wks after device removal 9/27) for the MRSA bacteremia 9/14, 9/15, 9/16 positive for MRSA. 9/18 NG  - completed 5day  Course of zosyn 10/4/22 for aspiration pneumonia.   -9/30 BCX negative , resp cultures 10/1 NG     H/O ventricular tachycardia  - Amiodarone 300 mg, daily  - Monitor LFTs.         Coronary artery disease involving native coronary artery of native heart without angina pectoris  - Asprin 81 mg, daily  - Atorvastatin 80 mg, nightl     Left Brachial Vein Thrombosis 9/21  - on Heparin . Will consider transitioning to eliquis on d/c     Acute on Chronic Gout  -2nd recurrence 10/8  while in the hospital.( 1st recurrence treated with 5 day course Pred completed 10/6).   -Treating with colchicine 0.6 mg OD. Cr. 1.8  -On allopurinol 200 mg       Abdominal discomfort  -unbable to take po intake, exaggerated BS.   - KUB, amylase , lipase.   -on pepcid, sucralfate.      Benton Rendon MD  Heart Transplant  [facility] - Cardiology Stepdown

## 2022-10-12 NOTE — PROGRESS NOTES
Discharge Planning     Per HTS Pt was planning to discharge today but will dispo in the next 1-3 days. LCSW sent HH orders for , IV ABX, and O2 to [facility] (f:[phone]). LCSW updated Marixa at [facility] ([phone]; fax [phone]). Pt will not discharge today. SW providing ongoing psychosocial, counseling, & emotional support, education, resources, assistance, and discharge planning as indicated.  SW to continue to follow.

## 2022-10-12 NOTE — PROGRESS NOTES
[facility] - Cardiology Stepdown  Heart Transplant  Progress Note    Patient Name: Audrey Oneil Jr.  MRN: [id number]  Admission Date: 9/14/2022  Hospital Length of Stay: 28 days  Attending Physician: Migue Henry Jr.,*  Primary Care Provider: Primary Doctor No  Principal Problem:Acute on chronic combined systolic and diastolic heart failure    Subjective:     Interval History: NAEON. Cr slightly trending up. Held diuretics since 2 days. on   2.5( baseline). If cr worsens further , will plan to get RHC.  Low grade fever and slightly elevated WBC, could be due to gout. Will repeat blood work tomorrow and reassess.     Plan for tunneled catheter insertion tomorrow afternoon.         Continuous Infusions:   sodium chloride 0.9% Stopped (09/28/22 1550)    sodium chloride 0.9% 5 mL/hr at 10/06/22 0105    DOBUTamine IV infusion (non-titrating) 2.5 mcg/kg/min (10/11/22 0217)    heparin (porcine) in D5W 19 Units/kg/hr (10/12/22 1240)     Scheduled Meds:   acetaminophen  650 mg Oral QID    allopurinoL  200 mg Oral Daily    amiodarone  300 mg Oral Daily    aspirin  81 mg Oral Daily    atorvastatin  80 mg Oral QHS    DAPTOmycin (CUBICIN) IV (PEDS and ADULTS)  10 mg/kg Intravenous Q24H    famotidine  20 mg Oral Daily    LIDOcaine  1 patch Transdermal Q24H    LIDOcaine HCl 2%  15 mL Oral Once    olopatadine  1 drop Both Eyes Daily    polyethylene glycol  17 g Oral Daily    potassium chloride  40 mEq Oral Once    sucralfate  1 g Oral BID     PRN Meds:sodium chloride, acetaminophen, artificial tears, dextromethorphan-guaiFENesin  mg, heparin (PORCINE), heparin (PORCINE), HYDROcodone-acetaminophen, melatonin, methocarbamoL, ondansetron, senna-docusate 8.6-50 mg, sodium chloride 0.9%    Review of patient's allergies indicates:   Allergen Reactions    Iodine containing multivitamin Swelling     itching    Keflex [cephalexin] Swelling     Eyes.  Tolerated multiple doses of zosyn and 1 dose of augmentin  in 2015 and 2016, respectively    Peaches [peach (prunus persica)] Swelling     eyes    Shellfish containing products Swelling    Fig tree Swelling     itching    Tuberculin spenser test ppd Rash     Objective:     Vital Signs (Most Recent):  Temp: 98.1 °F (36.7 °C) (10/12/22 1145)  Pulse: 95 (10/12/22 1145)  Resp: 18 (10/12/22 1145)  BP: (!) 91/52 (10/12/22 1145)  SpO2: (!) 93 % (10/12/22 1145)   Vital Signs (24h Range):  Temp:  [97.7 °F (36.5 °C)-100.3 °F (37.9 °C)] 98.1 °F (36.7 °C)  Pulse:  [] 95  Resp:  [16-20] 18  SpO2:  [93 %-97 %] 93 %  BP: ()/(52-55) 91/52     Patient Vitals for the past 72 hrs (Last 3 readings):   Weight   10/12/22 0736 89.4 kg (197 lb 1.5 oz)   10/11/22 0804 90.8 kg (200 lb 2.8 oz)       Body mass index is 30.87 kg/m².      Intake/Output Summary (Last 24 hours) at 10/12/2022 1344  Last data filed at 10/12/2022 1330  Gross per 24 hour   Intake 1307.5 ml   Output 200 ml   Net 1107.5 ml           Physical Exam  Constitutional:       General: He is not in acute distress.     Appearance: Normal appearance. He is normal weight. He is not ill-appearing or toxic-appearing.   HENT:      Head: Normocephalic and atraumatic.      Nose: Nose normal. No congestion.      Mouth/Throat:      Mouth: Mucous membranes are moist.      Pharynx: No oropharyngeal exudate.   Eyes:      General:         Right eye: No discharge.         Left eye: No discharge.      Extraocular Movements: Extraocular movements intact.      Pupils: Pupils are equal, round, and reactive to light.   Cardiovascular:      Rate and Rhythm: Normal rate and regular rhythm.      Heart sounds: No murmur heard.    No friction rub. No gallop.   Pulmonary:      Effort: Pulmonary effort is normal. No respiratory distress.      Breath sounds: Normal breath sounds. No wheezing, rhonchi or rales.   Abdominal:      General: Abdomen is flat. Bowel sounds are normal. There is no distension.      Palpations: Abdomen is soft.       Tenderness: There is no abdominal tenderness.   Musculoskeletal:         General: No swelling. Normal range of motion.      Cervical back: Normal range of motion. No rigidity.      Right lower leg: No edema.      Left lower leg: No edema.   Skin:     General: Skin is warm and dry.      Findings: No lesion or rash.   Neurological:      General: No focal deficit present.      Mental Status: He is alert and oriented to person, place, and time.      Cranial Nerves: No cranial nerve deficit.      Motor: No weakness.       Significant Labs:  CBC:  Recent Labs   Lab 10/10/22  0556 10/11/22  0500 10/12/22  0406   WBC 8.02 8.70 11.18   RBC 3.11* 3.13* 3.30*   HGB 8.6* 8.6* 9.0*   HCT 27.2* 27.6* 29.5*    282 291   MCV 88 88 89   MCH 27.7 27.5 27.3   MCHC 31.6* 31.2* 30.5*       BNP:  No results for input(s): BNP in the last 168 hours.    Invalid input(s): BNPTRIAGELBLO  CMP:  Recent Labs   Lab 10/10/22  0556 10/10/22  1334 10/11/22  0500 10/12/22  0406   GLU 96  --  91 118*   CALCIUM 8.9  --  8.9 8.7   ALBUMIN  --  2.8*  --   --    PROT  --  6.5  --   --      --  136 135*   K 3.7  --  4.0 4.1   CO2 28  --  30* 27   CL 99  --  97 96   BUN 28*  --  26* 29*   CREATININE 1.7*  --  1.8* 1.9*   ALKPHOS  --  86  --   --    ALT  --  24  --   --    AST  --  20  --   --    BILITOT  --  0.6  --   --         Coagulation:   Recent Labs   Lab 10/10/22  0556 10/11/22  0500 10/12/22  0406   APTT 50.5* 50.7* 39.4*       LDH:  No results for input(s): LDH in the last 72 hours.  Microbiology:  Microbiology Results (last 7 days)       ** No results found for the last 168 hours. **            BMP:   Recent Labs   Lab 10/12/22  0406   *   *   K 4.1   CL 96   CO2 27   BUN 29*   CREATININE 1.9*   CALCIUM 8.7   MG 2.1       I have reviewed all pertinent labs within the past 24 hours.    Estimated Creatinine Clearance: 38.6 mL/min (A) (based on SCr of 1.9 mg/dL (H)).    Diagnostic Results:  Reviewed     Assessment and Plan:      71 yo WM being worked up for LVAD since hospitalization January 2022 for recurrent VT (he thinks had MI) and cardiogenic shock at [facility]. He was  later seen in [facility] where he was treated for cardiogenic shock and sent home on .  He remains on  and is pursuing evaluation for LVAD.     His case was discussed at selection committee and he was deferred pending evaluation of pancreatic mass.  They wish to proceed with MRI but not sure with his ICD if this will be possible.Presented with MRSA bacteremia on admission.   HARRY 9/21/22: Not concerning for Vegetations. BCx negative 9/18.      9/27 CRT generator and lead extraction, pocket cx => hypotensive post-procedure requiring Epi, ICU  9/28 off Epi, transfer to CSU  9/29 sudden hypoxemic respiratory failure requiring urgent intubation after sats dropped while laying flat for PICC line => tx ICU, panculture       Acute Hypoxic respiratory failure 9/29/22 -resolved  -suspect aspiration event. Completed 5 day course with zosyn 10/4/22  -intubated and sedated 9/29, extubated 10/1/22  -Saturating well on room air.   -step down to floor 10/6.     * Chronic combined systolic and diastolic congestive heart failure  - On Dobutamine 2.5(baseline)   - please do not uptitrate  just based on MVO2 as his MVO2 is very fluctuant). He gets symptomatic with dizziness, headaches, abd discomfort with  5.   - Diuretics on hold, slight elevation in cr.   - Daily weights, Strict I/Os  - Monitor electrolytes and Maintain Mg >2 and K >4  -Telemetry monitoring  -Plan for tunneled cath insertion tomorrow and d/c on , abx .     Pancreatic lesion  - Gastroenterology on board  - EUS with biopsy  10/4, cytology results -negative for malignant cells, c/w cyst contents.      Bacteremia due to methicillin resistant Staphylococcus aureus  - Monitor WBC count and fever curve, pan-culture if patient spikes  - ID consult and recommendations are appreciated  - On Dapto currently end date 11/11  per ID ( 6 wks after device removal 9/27) for the MRSA bacteremia 9/14, 9/15, 9/16 positive for MRSA. 9/18 NG  - completed 5day  Course of zosyn 10/4/22 for aspiration pneumonia.   -9/30 BCX negative , resp cultures 10/1 NG    -low grade fever last night with mild wbc elevation, could be due to gout. If persistent tomorrow, will send BC and postpone tunneled cath insertion.      H/O ventricular tachycardia  - Amiodarone 300 mg, daily  - Monitor LFTs.         Coronary artery disease involving native coronary artery of native heart without angina pectoris  - Asprin 81 mg, daily  - Atorvastatin 80 mg, nightl     Left Brachial Vein Thrombosis 9/21  - on Heparin . Will consider transitioning to eliquis on d/c     Acute on Chronic Gout  -2nd recurrence 10/8  while in the hospital.( 1st recurrence treated with 5 day course Pred completed 10/6).   -Completed  0.6 mg OD for 4 days 10/12/22. Cr. 1.9. feels better today. Will d/c colchicine.   -On allopurinol 200 mg          Benton Rendon MD  Heart Transplant  [facility] - Cardiology Stepdown

## 2022-10-12 NOTE — SUBJECTIVE & OBJECTIVE
Interval History: NAEON. Cr slightly trending up. Held diuretics since 2 days. on   2.5( baseline). If cr worsens further , will plan to get RHC.  Low grade fever and slightly elevated WBC, could be due to gout. Will repeat blood work tomorrow and reassess.     Plan for tunneled catheter insertion tomorrow afternoon.         Continuous Infusions:   sodium chloride 0.9% Stopped (09/28/22 1550)    sodium chloride 0.9% 5 mL/hr at 10/06/22 0105    DOBUTamine IV infusion (non-titrating) 2.5 mcg/kg/min (10/11/22 0217)    heparin (porcine) in D5W 19 Units/kg/hr (10/12/22 1240)     Scheduled Meds:   acetaminophen  650 mg Oral QID    allopurinoL  200 mg Oral Daily    amiodarone  300 mg Oral Daily    aspirin  81 mg Oral Daily    atorvastatin  80 mg Oral QHS    DAPTOmycin (CUBICIN) IV (PEDS and ADULTS)  10 mg/kg Intravenous Q24H    famotidine  20 mg Oral Daily    LIDOcaine  1 patch Transdermal Q24H    LIDOcaine HCl 2%  15 mL Oral Once    olopatadine  1 drop Both Eyes Daily    polyethylene glycol  17 g Oral Daily    potassium chloride  40 mEq Oral Once    sucralfate  1 g Oral BID     PRN Meds:sodium chloride, acetaminophen, artificial tears, dextromethorphan-guaiFENesin  mg, heparin (PORCINE), heparin (PORCINE), HYDROcodone-acetaminophen, melatonin, methocarbamoL, ondansetron, senna-docusate 8.6-50 mg, sodium chloride 0.9%    Review of patient's allergies indicates:   Allergen Reactions    Iodine containing multivitamin Swelling     itching    Keflex [cephalexin] Swelling     Eyes.  Tolerated multiple doses of zosyn and 1 dose of augmentin in 2015 and 2016, respectively    Peaches [peach (prunus persica)] Swelling     eyes    Shellfish containing products Swelling    Fig tree Swelling     itching    Tuberculin spenser test ppd Rash     Objective:     Vital Signs (Most Recent):  Temp: 98.1 °F (36.7 °C) (10/12/22 1145)  Pulse: 95 (10/12/22 1145)  Resp: 18 (10/12/22 1145)  BP: (!) 91/52 (10/12/22 1145)  SpO2: (!) 93 %  (10/12/22 1145)   Vital Signs (24h Range):  Temp:  [97.7 °F (36.5 °C)-100.3 °F (37.9 °C)] 98.1 °F (36.7 °C)  Pulse:  [] 95  Resp:  [16-20] 18  SpO2:  [93 %-97 %] 93 %  BP: ()/(52-55) 91/52     Patient Vitals for the past 72 hrs (Last 3 readings):   Weight   10/12/22 0736 89.4 kg (197 lb 1.5 oz)   10/11/22 0804 90.8 kg (200 lb 2.8 oz)       Body mass index is 30.87 kg/m².      Intake/Output Summary (Last 24 hours) at 10/12/2022 1344  Last data filed at 10/12/2022 1330  Gross per 24 hour   Intake 1307.5 ml   Output 200 ml   Net 1107.5 ml           Physical Exam  Constitutional:       General: He is not in acute distress.     Appearance: Normal appearance. He is normal weight. He is not ill-appearing or toxic-appearing.   HENT:      Head: Normocephalic and atraumatic.      Nose: Nose normal. No congestion.      Mouth/Throat:      Mouth: Mucous membranes are moist.      Pharynx: No oropharyngeal exudate.   Eyes:      General:         Right eye: No discharge.         Left eye: No discharge.      Extraocular Movements: Extraocular movements intact.      Pupils: Pupils are equal, round, and reactive to light.   Cardiovascular:      Rate and Rhythm: Normal rate and regular rhythm.      Heart sounds: No murmur heard.    No friction rub. No gallop.   Pulmonary:      Effort: Pulmonary effort is normal. No respiratory distress.      Breath sounds: Normal breath sounds. No wheezing, rhonchi or rales.   Abdominal:      General: Abdomen is flat. Bowel sounds are normal. There is no distension.      Palpations: Abdomen is soft.      Tenderness: There is no abdominal tenderness.   Musculoskeletal:         General: No swelling. Normal range of motion.      Cervical back: Normal range of motion. No rigidity.      Right lower leg: No edema.      Left lower leg: No edema.   Skin:     General: Skin is warm and dry.      Findings: No lesion or rash.   Neurological:      General: No focal deficit present.      Mental Status: He  is alert and oriented to person, place, and time.      Cranial Nerves: No cranial nerve deficit.      Motor: No weakness.       Significant Labs:  CBC:  Recent Labs   Lab 10/10/22  0556 10/11/22  0500 10/12/22  0406   WBC 8.02 8.70 11.18   RBC 3.11* 3.13* 3.30*   HGB 8.6* 8.6* 9.0*   HCT 27.2* 27.6* 29.5*    282 291   MCV 88 88 89   MCH 27.7 27.5 27.3   MCHC 31.6* 31.2* 30.5*       BNP:  No results for input(s): BNP in the last 168 hours.    Invalid input(s): BNPTRIAGELBLO  CMP:  Recent Labs   Lab 10/10/22  0556 10/10/22  1334 10/11/22  0500 10/12/22  0406   GLU 96  --  91 118*   CALCIUM 8.9  --  8.9 8.7   ALBUMIN  --  2.8*  --   --    PROT  --  6.5  --   --      --  136 135*   K 3.7  --  4.0 4.1   CO2 28  --  30* 27   CL 99  --  97 96   BUN 28*  --  26* 29*   CREATININE 1.7*  --  1.8* 1.9*   ALKPHOS  --  86  --   --    ALT  --  24  --   --    AST  --  20  --   --    BILITOT  --  0.6  --   --         Coagulation:   Recent Labs   Lab 10/10/22  0556 10/11/22  0500 10/12/22  0406   APTT 50.5* 50.7* 39.4*       LDH:  No results for input(s): LDH in the last 72 hours.  Microbiology:  Microbiology Results (last 7 days)       ** No results found for the last 168 hours. **            BMP:   Recent Labs   Lab 10/12/22  0406   *   *   K 4.1   CL 96   CO2 27   BUN 29*   CREATININE 1.9*   CALCIUM 8.7   MG 2.1       I have reviewed all pertinent labs within the past 24 hours.    Estimated Creatinine Clearance: 38.6 mL/min (A) (based on SCr of 1.9 mg/dL (H)).    Diagnostic Results:  Reviewed

## 2022-10-12 NOTE — PLAN OF CARE
[facility]   Heart Transplant Clinic  [address] [place] [address]   [phone] after hours        HOME  HEALTH ORDERS      Admit to Home Health    Diagnosis:   Patient Active Problem List   Diagnosis    Coronary artery disease involving native coronary artery of native heart without angina pectoris    Chronic combined systolic and diastolic heart failure    Obesity (BMI 30.0-34.9)    Cardiomyopathy, ischemic    Hx pulmonary embolism 2010 provoked dvt     Postural dizziness with presyncope    Acute hypoxemic respiratory failure    History of DVT (deep vein thrombosis)    Ventricular tachycardia    Hypertensive cardiovascular-renal disease, stage 1-4 or unspecified chronic kidney disease, with heart failure    Presence of cardiac resynchronization therapy defibrillator (CRT-D)    Mixed hyperlipidemia    Long term current use of amiodarone    Thoracic aortic atherosclerosis    Stage 3a chronic kidney disease    Prediabetes    LBBB (left bundle branch block)    Elevated troponin I level    Gout    Acute on chronic combined systolic and diastolic heart failure    H/O ventricular tachycardia    Hypoxia    Vitamin D deficiency    Blue skin    MRSA infection    Rotator cuff syndrome    Severe sepsis    Pressure injury of heel, stage 2    Dermatitis associated with moisture    Skin breakdown    Leukocytosis    Bacteremia due to methicillin resistant Staphylococcus aureus    MRSA bacteremia    Pancreatic lesion    Cellulitis of left forearm    Acute thrombosis of left brachial vein    Vasogenic shock    SSS (sick sinus syndrome)    Ischemic cardiomyopathy    Encounter for weaning from ventilator       Patient is homebound due to:   Diet: Low Sodium  Acitivities: As Tolerated    Nursing:   SN to complete comprehensive assessment including routine vital signs. Instruct on disease process and s/s of complications to report to MD. Review/verify medication list sent home  with the patient at time of discharge  and instruct patient/caregiver as needed. Frequency may be adjusted depending on start of care date.    Notify MD if SBP > 160 or < 90; DBP > 90 or < 50; HR > 120 or < 50; Temp > 101; Weight gain >3lbs in 1 day or 5lbs in 1 week.    LABS:  SN to perform labs: CBC, CMP, CPK drawn on Mondays.  Fax lab results to Dr. Russell at [facility] Fax Number: [phone]    HOME INFUSION THERAPY:  Dobutamine at 2.5 panchito/kg/min. Dosing weight 98 kgs.   SN to perform Infusion Therapy/Central Line Care.  Review Central Line Care & Central Line Flush with patient.    Administer (drug and dose): Daptomycin 900 mg IV q 24 hrs. End date 11/9/22    **For questions or concerns, please call [phone] and ask for Pre-Heart transplant clinic, M-F 8-5. After hours, weekends, call [phone] and ask for the Heart Transplant Cardiologist on call.**    Central line care:  Scrub the Hub: Prior to accessing the line, always perform a 30 second alcohol scrub  Each lumen of the central line is to be flushed at least daily with 10 mL Normal Saline and 3 mL Heparin flush (100 units/mL)  Skilled Nurse (SN) may draw blood from IV access  Blood Draw Procedure:   - Aspirate at least 5 mL of blood   - Discard   - Obtain specimen   - Change posiflow cap   - Flush with 20 mL Normal Saline followed by a                 3-5 mL Heparin flush (100 units/mL)  Central :   - Sterile dressing changes are done weekly and as needed.   - Use chlor-hexadine scrub to cleanse site, apply Biopatch to insertion site,       apply securement device dressing   - Posi-flow caps are changed weekly and after EVERY lab draw.   - If sterile gauze is under dressing to control oozing,                 dressing change must be performed every 24 hours until gauze is not needed.    CONSULTS:    Prior to discharge, please ensure the patient's follow-up has been scheduled with Infectious Diseases.  If there is still no  follow-up scheduled prior to discharge, please send an EPIC message to Rufina Miles in Infectious Diseases    Physical Therapy to evaluate and treat. Evaluate for home safety and equipment needs; Establish/upgrade home exercise program. Perform / instruct on therapeutic exercises, gait training, transfer training, and Range of Motion.    Occupational Therapy to evaluate and treat. Evaluate home environment for safety and equipment needs. Perform/Instruct on transfers, ADL training, ROM, and therapeutic exercises.    Speech Therapy  to evaluate and treat for:  Language  Swallowing  Cognition                                              to evaluate for community resources/long-range planning.     Aide to provide assistance with personal care, ADLs, and vital signs    Send initial Home Health orders to HTS attending physician on call.  Send follow up questions to [phone] or fax:                     Pre Transplant:   [phone]        Post Transplant: [phone]        Heart Failure:      [phone]

## 2022-10-12 NOTE — PT/OT/SLP PROGRESS
Occupational Therapy      Patient Name:  Audrey Oneil Jr.   MRN:  [id number]    Pt not seen this date.   Upon OT arrival this afternoon, pt sleeping soundly in room with girlfriend present. She reports pt continues to feel poorly with continued nausea throughout this morning. She reports she did not sleep well last night and had finally fallen asleep.   Medical chart indicates d/c postponed due fever and elevated WBC count. OT to check status at later date to continue with therapy as appropriate.     10/12/2022

## 2022-10-12 NOTE — PLAN OF CARE
Home Oxygen Evaluation    Date Performed: 10/12/2022    1) Patient's Home O2 Sat on room air, while at rest: 88        If O2 sats on room air at rest are 88% or below, patient qualifies. No additional testing needed. Document N/A in steps 2 and 3. If 89% or above, complete steps 2.      2) Patient's O2 Sat on room air while exercising:         If O2 sats on room air while exercising remain 89% or above patient does not qualify, no further testing needed Document N/A in step 3. If O2 sats on room air while exercising are 88% or below, continue to step 3.      3) Patient's O2 Sat while exercising on O2:  at  LPM         (Must show improvement from #2 for patients to qualify)    If O2 sats improve on oxygen, patient qualifies for portable oxygen. If not, the patient does not qualify.

## 2022-10-13 NOTE — NURSING TRANSFER
Nursing Transfer Note      10/13/2022     Reason patient is being transferred: to room    Transfer To: [id number]    Transfer via stretcher    Transfer with  to O2    Transported by pct    Medicines sent: none    Any special needs or follow-up needed: yes    Chart send with patient: Yes    Notified: RN    Patient reassessed at: 10;/13/33

## 2022-10-13 NOTE — ANESTHESIA PREPROCEDURE EVALUATION
10/13/2022  Pre-operative evaluation for * No procedures listed *    Audrey Oneil Jr. is a 72 y.o. male          Audrey Oneil Jr. is a 72 y.o. male with a PMHx significant for HFrEF (EF 15%, s/p CRT-D, on home ionotropes), CAD s/p PCI, history of VT, HLD, CKD who presents for abdominal pain, found to be in decompensated CHF as well as MRSA bacteremia. Admitted to  for LVAD evaluation. Undergoing preop eval for picc insertion with anesthesia -- pt unable to tolerate the procedure without anesthesia earlier     Patient Active Problem List   Diagnosis    Coronary artery disease involving native coronary artery of native heart without angina pectoris    Chronic combined systolic and diastolic heart failure    Obesity (BMI 30.0-34.9)    Cardiomyopathy, ischemic    Hx pulmonary embolism 2010 provoked dvt     Postural dizziness with presyncope    Acute hypoxemic respiratory failure    History of DVT (deep vein thrombosis)    Ventricular tachycardia    Hypertensive cardiovascular-renal disease, stage 1-4 or unspecified chronic kidney disease, with heart failure    Presence of cardiac resynchronization therapy defibrillator (CRT-D)    Mixed hyperlipidemia    Long term current use of amiodarone    Thoracic aortic atherosclerosis    Stage 3a chronic kidney disease    Prediabetes    LBBB (left bundle branch block)    Elevated troponin I level    Gout    Acute on chronic combined systolic and diastolic heart failure    H/O ventricular tachycardia    Hypoxia    Vitamin D deficiency    Blue skin    MRSA infection    Rotator cuff syndrome    Severe sepsis    Pressure injury of heel, stage 2    Dermatitis associated with moisture    Skin breakdown    Leukocytosis    Bacteremia due to methicillin resistant Staphylococcus aureus    MRSA bacteremia    Pancreatic lesion    Cellulitis of  left forearm    Acute thrombosis of left brachial vein    Vasogenic shock    SSS (sick sinus syndrome)    Ischemic cardiomyopathy    Encounter for weaning from ventilator       Review of patient's allergies indicates:   Allergen Reactions    Iodine containing multivitamin Swelling     itching    Keflex [cephalexin] Swelling     Eyes.  Tolerated multiple doses of zosyn and 1 dose of augmentin in 2015 and 2016, respectively    Peaches [peach (prunus persica)] Swelling     eyes    Shellfish containing products Swelling    Fig tree Swelling     itching    Tuberculin spenser test ppd Rash       No current facility-administered medications on file prior to encounter.     Current Outpatient Medications on File Prior to Encounter   Medication Sig Dispense Refill    allopurinoL (ZYLOPRIM) 100 MG tablet Take 2 tablets (200 mg total) by mouth once daily. 60 tablet 0    amiodarone (PACERONE) 200 MG Tab TAKE 1 AND 1/2 TABLETS(300 MG) BY MOUTH EVERY  tablet 3    aspirin (ECOTRIN) 81 MG EC tablet Take 1 tablet (81 mg total) by mouth once daily. 90 tablet 3    atorvastatin (LIPITOR) 80 MG tablet Take 1 tablet (80 mg total) by mouth once daily. 90 tablet 1    DOBUTamine (DOBUTREX) 500 mg/250 mL (2,000 mcg/mL) 2.5 mcg/kg/min  Patient is 95.7 kg 250 mL 5    furosemide (LASIX) 40 MG tablet Take 1 tablet (40 mg total) by mouth daily as needed (Weight gain of 3 lbs in one day or 5 lbs in one week). 30 tablet 11    HYDROcodone-acetaminophen (NORCO)  mg per tablet Take 1 tablet by mouth every 6 (six) hours.      JARDIANCE 25 mg tablet Take 1 tablet (25 mg total) by mouth once daily. 30 tablet 5    ondansetron (ZOFRAN-ODT) 4 MG TbDL Dissolve 1 tablet (4 mg total) by mouth every 6 (six) hours as needed (nausea). 30 tablet 1    polyethylene glycol (GOLYTELY) 236-22.74-6.74 -5.86 gram suspension SMARTSIG:Milliliter(s) By Mouth      sacubitriL-valsartan (ENTRESTO) 49-51 mg per tablet Take 1 tablet by mouth 2  (two) times daily. 60 tablet 3    simethicone (MYLICON) 80 MG chewable tablet Take 1 tablet (80 mg total) by mouth every 6 (six) hours as needed for Flatulence. 60 tablet 1    spironolactone (ALDACTONE) 25 MG tablet Take 1 tablet (25 mg total) by mouth once daily. 30 tablet 3    [DISCONTINUED] clopidogreL (PLAVIX) 75 mg tablet Take 1 tablet (75 mg total) by mouth once daily. (Patient not taking: No sig reported) 90 tablet 3    [DISCONTINUED] colchicine (COLCRYS) 0.6 mg tablet Take 1 tablet (0.6 mg total) by mouth once daily. (Patient not taking: Reported on 3/18/2022) 90 tablet 1    [DISCONTINUED] ipratropium (ATROVENT) 0.02 % nebulizer solution Take 2.5 mLs (500 mcg total) by nebulization 4 (four) times daily as needed for Wheezing. Rescue (Patient not taking: Reported on 3/29/2022) 75 mL 0    [DISCONTINUED] metoprolol succinate (TOPROL-XL) 25 MG 24 hr tablet Take 1 tablet (25 mg total) by mouth once daily. 90 tablet 3    [DISCONTINUED] midodrine (PROAMATINE) 2.5 MG Tab Take 1 tablet (2.5 mg total) by mouth 3 (three) times daily. HOLD until follow up with cardiology 90 tablet 0       Past Surgical History:   Procedure Laterality Date    APPENDECTOMY      BACK SURGERY      CARDIAC DEFIBRILLATOR PLACEMENT      CARDIAC SURGERY  2007    stent    CARPAL TUNNEL RELEASE Right 04/2018    COLONOSCOPY N/A 8/8/2022    Procedure: COLONOSCOPY;  Surgeon: Sascha Keenan MD;  [facility]  Service: Endoscopy;  Laterality: N/A;  EF-15%  pre heart    AICD-St Rodri       COVID test [facility] 8/5-inst mail-am prep-clears 4 hrs prior-tb    ENDOSCOPIC ULTRASOUND OF UPPER GASTROINTESTINAL TRACT N/A 10/4/2022    Procedure: ULTRASOUND, UPPER GI TRACT, ENDOSCOPIC;  Surgeon: Charlie Smith MD;  Location: [facility] ENDO (2ND FLR);  Service: Endoscopy;  Laterality: N/A;    INSERTION OF BIVENTRICULAR IMPLANTABLE CARDIOVERTER-DEFIBRILLATOR (ICD) N/A 5/3/2019    Procedure: INSERTION, ICD, BIVENTRICULAR;  Surgeon: Teofilo  JARVIS Pal MD;  Location: [facility] EP LAB;  Service: Cardiology;  Laterality: N/A;  ICH CM,  ICD UPGD BI-V,  SJM, MAC, FAS, 3PREP (dual ICD INSITU)    r knee scope      REVISION OF SKIN POCKET FOR CARDIOVERTER-DEFIBRILLATOR Left 5/3/2019    Procedure: Revision, Skin Pocket, For Cardioverter-Defibrillator;  Surgeon: Teofilo Pal MD;  Location: [facility] EP LAB;  Service: Cardiology;  Laterality: Left;    RIGHT HEART CATHETERIZATION Right 2021    Procedure: INSERTION, CATHETER, RIGHT HEART;  Surgeon: Lizz Nieto MD;  Location: [facility] CATH LAB;  Service: Cardiology;  Laterality: Right;    RIGHT HEART CATHETERIZATION Right 2022    Procedure: INSERTION, CATHETER, RIGHT HEART;  Surgeon: Migue Henry Jr., MD;  Location: [facility] CATH LAB;  Service: Cardiology;  Laterality: Right;    SPINE SURGERY      TONSILLECTOMY      TRIGGER FINGER RELEASE Right 2018    thumb       Social History     Socioeconomic History    Marital status:     Number of children: 2   Occupational History    Occupation: [employer]     Comment: unemployed   Tobacco Use    Smoking status: Former     Packs/day: 1.00     Years: 45.00     Pack years: 45.00     Types: Cigarettes     Quit date: 2005     Years since quittin.8    Smokeless tobacco: Never    Tobacco comments:     1-1.5 ppd every day.   Substance and Sexual Activity    Alcohol use: No    Drug use: No    Sexual activity: Not Currently         CBC:   Recent Labs     10/12/22  0406 10/13/22  0415   WBC 11.18 7.93   RBC 3.30* 3.08*   HGB 9.0* 8.4*   HCT 29.5* 27.5*    264   MCV 89 89   MCH 27.3 27.3   MCHC 30.5* 30.5*       CMP:   Recent Labs     10/12/22  0406 10/13/22  0415 10/13/22  0925   * 136  --    K 4.1 4.2  --    CL 96 98  --    CO2 27 28  --    BUN 29* 32*  --    CREATININE 1.9* 2.0*  --    * 96  --    MG 2.1 2.1  --    CALCIUM 8.7 9.0  --    ALBUMIN  --   --  2.8*   PROT  --   --  6.7   ALKPHOS  --   --  86   ALT  --    --  19   AST  --   --  21   BILITOT  --   --  0.8       INR  Recent Labs     10/11/22  0500 10/12/22  0406 10/13/22  0415   APTT 50.7* 39.4* 44.7*           Diagnostic Studies:      EKD Echo:  Results for orders placed or performed during the hospital encounter of 18   2D echo with color flow doppler   Result Value Ref Range    EF + QEF 20 (A) 55 - 65    Mitral Valve Regurgitation TRIVIAL     Diastolic Dysfunction Yes (A)     Est. PA Systolic Pressure 25.28     Tricuspid Valve Regurgitation TRIVIAL TO MILD          Pre-op Assessment    I have reviewed the NPO Status.   I have reviewed the Medications.     Review of Systems  Anesthesia Hx:  No problems with previous Anesthesia   Denies Personal Hx of Anesthesia complications.   Cardiovascular:   Hypertension Past MI Dysrhythmias  Orthopnea    Pulmonary:  Pulmonary Normal    Hepatic/GI:  Hepatic/GI Normal    Musculoskeletal:  Musculoskeletal Normal    Endocrine:  Endocrine Normal        Physical Exam  General: Cooperative, Alert and Oriented    Airway:  Mallampati: III / III  Mouth Opening: Normal  Tongue: Normal    Dental:  Loose teeth, Caps / Implants    Chest/Lungs:  Normal Respiratory Rate  Decreased Breath Sounds: left and right    Heart:  Rhythm: Regular Rhythm        Anesthesia Plan  Type of Anesthesia, risks & benefits discussed:    Anesthesia Type: Gen Supraglottic Airway, MAC  Intra-op Monitoring Plan: Standard ASA Monitors  Post Op Pain Control Plan: multimodal analgesia  Induction:  IV  ASA Score: 4    Ready For Surgery From Anesthesia Perspective.     .

## 2022-10-13 NOTE — H&P
Inpatient Radiology Pre-procedure Note    History of Present Illness:  Audrey Oneil Jr. is a 72 y.o. male PMH ICM, recurrent VT admitted recently for cardiogenic shock and is on home Dobutamine 2.5 mcg/kg/min undergoing workup for LVAD now admitted 9/14 for ADHF and found to have MRSA will complete Daptomycin course today.     VSS. Heparin gtt -aPTT 50.7 (10/11)       IR consulted for tunneled line placement for dobutamine drip.         Admission H&P reviewed.  Past Medical History:   Diagnosis Date    Acute hypoxemic respiratory failure 11/7/2015    Acute idiopathic gout of left knee 12/2/2019    Acute idiopathic gout of right elbow 9/23/2021    AICD (automatic cardioverter/defibrillator) present 12/13/2015      Anticoagulant long-term use     Bronchopneumonia 12/20/2021    CHF (congestive heart failure)     Chronic anticoagulation 5/5/2016    Chronic combined systolic and diastolic heart failure 11/26/2012    EF 10-20% on ECHO 2013    Chronic gout 12/2/2019    Clotting disorder     Coronary artery disease involving native coronary artery of native heart without angina pectoris 11/26/2012    Cath 10- Stents patent non-obstructive disease Cath 11-12015 non-obstructive disease     COVID-19 08/2021    Had antibody infusion    Diverticulosis of colon     DVT (deep venous thrombosis), unspecified laterality 11/12/2015    Dysphonia 1/28/2018    Essential hypertension 11/15/2015    Fine motor impairment 2/2/2021    Hyperlipidemia     Hypertensive heart disease with heart failure 5/5/2016    MI (myocardial infarction) 2009    x's 5    Nicotine abuse     Obesity 11/26/2012    Olecranon bursitis of right elbow 9/19/2021    Pulmonary embolism 2011    Renal disorder     LAVERNE    Right carpal tunnel syndrome 4/6/2018    Stented coronary artery 11/26/2012    LAD stent placed 10/17/2007     Trigger thumb of right hand 4/6/2018     Past Surgical History:   Procedure Laterality Date    APPENDECTOMY      BACK  SURGERY      CARDIAC DEFIBRILLATOR PLACEMENT      CARDIAC SURGERY  2007    stent    CARPAL TUNNEL RELEASE Right 04/2018    COLONOSCOPY N/A 8/8/2022    Procedure: COLONOSCOPY;  Surgeon: Sascha Keenan MD;  Location: [facility] ENDO (2ND FLR);  Service: Endoscopy;  Laterality: N/A;  EF-15%  pre heart    AICD-St Rodri       COVID test [facility] 8/5-inst mail-am prep-clears 4 hrs prior-tb    ENDOSCOPIC ULTRASOUND OF UPPER GASTROINTESTINAL TRACT N/A 10/4/2022    Procedure: ULTRASOUND, UPPER GI TRACT, ENDOSCOPIC;  Surgeon: Charlie Smith MD;  Location: [facility] ENDO (2ND FLR);  Service: Endoscopy;  Laterality: N/A;    INSERTION OF BIVENTRICULAR IMPLANTABLE CARDIOVERTER-DEFIBRILLATOR (ICD) N/A 5/3/2019    Procedure: INSERTION, ICD, BIVENTRICULAR;  Surgeon: Teofilo Pal MD;  Location: [facility] EP LAB;  Service: Cardiology;  Laterality: N/A;  ICH CM,  ICD UPGD BI-V,  SJM, MAC, FAS, 3PREP (dual ICD INSITU)    r knee scope      REVISION OF SKIN POCKET FOR CARDIOVERTER-DEFIBRILLATOR Left 5/3/2019    Procedure: Revision, Skin Pocket, For Cardioverter-Defibrillator;  Surgeon: Teofilo Pal MD;  Location: [facility] EP LAB;  Service: Cardiology;  Laterality: Left;    RIGHT HEART CATHETERIZATION Right 6/14/2021    Procedure: INSERTION, CATHETER, RIGHT HEART;  Surgeon: Lizz Nieto MD;  Location: [facility] CATH LAB;  Service: Cardiology;  Laterality: Right;    RIGHT HEART CATHETERIZATION Right 6/17/2022    Procedure: INSERTION, CATHETER, RIGHT HEART;  Surgeon: Migue Henry Jr., MD;  Location: [facility] CATH LAB;  Service: Cardiology;  Laterality: Right;    SPINE SURGERY      TONSILLECTOMY      TRIGGER FINGER RELEASE Right 04/2018    thumb       Review of Systems:   As documented in primary team H&P    Home Meds:   Prior to Admission medications    Medication Sig Start Date End Date Taking? Authorizing Provider   allopurinoL (ZYLOPRIM) 100 MG tablet Take 2 tablets (200 mg total) by mouth once daily. 6/23/22   Venkatesh Love PA-C   amiodarone  (PACERONE) 200 MG Tab TAKE 1 AND 1/2 TABLETS(300 MG) BY MOUTH EVERY DAY 6/23/22   Venkatesh Love PA-C   aspirin (ECOTRIN) 81 MG EC tablet Take 1 tablet (81 mg total) by mouth once daily. 6/23/22 6/23/23  Venkatesh Love PA-C   atorvastatin (LIPITOR) 80 MG tablet Take 1 tablet (80 mg total) by mouth once daily. 6/23/22   Venkatesh Love PA-C   DOBUTamine (DOBUTREX) 500 mg/250 mL (2,000 mcg/mL) 2.5 mcg/kg/min  Patient is 95.7 kg 6/23/22   Venkatesh Love PA-C   furosemide (LASIX) 40 MG tablet Take 1 tablet (40 mg total) by mouth daily as needed (Weight gain of 3 lbs in one day or 5 lbs in one week). 6/23/22 6/23/23  Venkatesh Love PA-C   HYDROcodone-acetaminophen (NORCO)  mg per tablet Take 1 tablet by mouth every 6 (six) hours. 7/7/22   Historical Provider   JARDIANCE 25 mg tablet Take 1 tablet (25 mg total) by mouth once daily. 8/25/22   Migue Henry Jr., MD   ondansetron (ZOFRAN-ODT) 4 MG TbDL Dissolve 1 tablet (4 mg total) by mouth every 6 (six) hours as needed (nausea). 6/23/22   Venkatesh Love PA-C   polyethylene glycol (GOLYTELY) 236-22.74-6.74 -5.86 gram suspension SMARTSIG:Milliliter(s) By Mouth 8/1/22   Historical Provider   sacubitriL-valsartan (ENTRESTO) 49-51 mg per tablet Take 1 tablet by mouth 2 (two) times daily. 6/23/22   Venkatesh Love PA-C   simethicone (MYLICON) 80 MG chewable tablet Take 1 tablet (80 mg total) by mouth every 6 (six) hours as needed for Flatulence. 6/23/22   Venkatesh Love PA-C   spironolactone (ALDACTONE) 25 MG tablet Take 1 tablet (25 mg total) by mouth once daily. 6/23/22   Venkatesh Love PA-C   clopidogreL (PLAVIX) 75 mg tablet Take 1 tablet (75 mg total) by mouth once daily.  Patient not taking: No sig reported 2/4/21 4/1/22  Chuck Sams II, MD   colchicine (COLCRYS) 0.6 mg tablet Take 1 tablet (0.6 mg total) by mouth once daily.  Patient not taking: Reported on 3/18/2022 12/20/21 4/1/22  Jaxson Santo MD   ipratropium (ATROVENT) 0.02 % nebulizer solution Take 2.5 mLs  (500 mcg total) by nebulization 4 (four) times daily as needed for Wheezing. Rescue  Patient not taking: Reported on 3/29/2022 12/15/21 4/1/22  Horacio Laughlin MD   metoprolol succinate (TOPROL-XL) 25 MG 24 hr tablet Take 1 tablet (25 mg total) by mouth once daily. 4/1/22 4/8/22  Uma Dupree MD   midodrine (PROAMATINE) 2.5 MG Tab Take 1 tablet (2.5 mg total) by mouth 3 (three) times daily. HOLD until follow up with cardiology 4/1/22 4/8/22  Uma Dupree MD     Scheduled Meds:    acetaminophen  650 mg Oral QID    allopurinoL  200 mg Oral Daily    amiodarone  300 mg Oral Daily    aspirin  81 mg Oral Daily    atorvastatin  80 mg Oral QHS    colchicine  0.6 mg Oral Daily    DAPTOmycin (CUBICIN) IV (PEDS and ADULTS)  10 mg/kg Intravenous Q24H    famotidine  20 mg Oral Daily    LIDOcaine  1 patch Transdermal Q24H    LIDOcaine HCl 2%  15 mL Oral Once    olopatadine  1 drop Both Eyes Daily    polyethylene glycol  17 g Oral Daily    potassium chloride  40 mEq Oral Once    sucralfate  1 g Oral BID     Continuous Infusions:    sodium chloride 0.9% Stopped (09/28/22 1550)    sodium chloride 0.9% 5 mL/hr at 10/06/22 0105    DOBUTamine IV infusion (non-titrating) 2.5 mcg/kg/min (10/11/22 0217)    heparin (porcine) in D5W 19 Units/kg/hr (10/10/22 2203)     PRN Meds:sodium chloride, acetaminophen, artificial tears, dextromethorphan-guaiFENesin  mg, heparin (PORCINE), heparin (PORCINE), HYDROcodone-acetaminophen, melatonin, methocarbamoL, ondansetron, senna-docusate 8.6-50 mg, sodium chloride 0.9%  Anticoagulants/Antiplatelets: Heparin gtt    Allergies:   Review of patient's allergies indicates:   Allergen Reactions    Iodine containing multivitamin Swelling     itching    Keflex [cephalexin] Swelling     Eyes.  Tolerated multiple doses of zosyn and 1 dose of augmentin in 2015 and 2016, respectively    Peaches [peach (prunus persica)] Swelling     eyes    Shellfish containing products Swelling    Fig tree Swelling      itching    Tuberculin spenser test ppd Rash     Sedation Hx: have not been any systemic reactions    Labs:  No results for input(s): INR in the last 168 hours.    Invalid input(s):  PT,  PTT    Recent Labs   Lab 10/11/22  0500   WBC 8.70   HGB 8.6*   HCT 27.6*   MCV 88         Recent Labs   Lab 10/10/22  1334 10/11/22  0500   GLU  --  91   NA  --  136   K  --  4.0   CL  --  97   CO2  --  30*   BUN  --  26*   CREATININE  --  1.8*   CALCIUM  --  8.9   MG  --  2.2   ALT 24  --    AST 20  --    ALBUMIN 2.8*  --    BILITOT 0.6  --    BILIDIR 0.3  --          Vitals:  Temp: 98.5 °F (36.9 °C) (10/11/22 0829)  Pulse: 90 (10/11/22 0840)  Resp: 18 (10/11/22 0829)  BP: 112/66 (10/11/22 0829)  SpO2: (!) 94 % (10/11/22 0829)     Physical Exam:  ASA: 3  Mallampati: 3    General: no acute distress  Mental Status: alert and oriented to person, place and time  HEENT: normocephalic, atraumatic  Chest: unlabored breathing  Abdomen: nondistended  Extremity: moves all extremities    Plan:     Tunneled central line placement    Cortney Lane MD  Radiology, PGY II  [facility]

## 2022-10-13 NOTE — PT/OT/SLP PROGRESS
Occupational Therapy      Patient Name:  Audrey GEORGE Thad Mccarthy   MRN:  [id number]    Patient not seen today secondary to pt receiving RHC. Will follow-up tomorrow.    10/13/2022

## 2022-10-13 NOTE — PLAN OF CARE
Pt arrived to IR room 190 for tunneled PICC. Pt on 2L NC. Pt difficult laying flat. Notified MD.Pt oriented to unit and staff. Plan of care reviewed with patient, patient verbalizes understanding. Comfort measures utilized.

## 2022-10-13 NOTE — ANESTHESIA POSTPROCEDURE EVALUATION
Anesthesia Post Evaluation    Patient: Audrey Oneil Jr.    Procedure(s) Performed: * No procedures listed *    Final Anesthesia Type: MAC      Patient location during evaluation: PACU  Patient participation: Yes- Able to Participate  Post-procedure vital signs: reviewed and stable  Pain management: adequate  Airway patency: patent    PONV status at discharge: No PONV  Anesthetic complications: yes (patient unable to tolerate the procedure under MAC. Given procedure was not urgent in nature decision was made to cancel so the pt could be medically optimized )                Vitals Value Taken Time   BP 94/53 10/13/22 1601   Temp 36.7 °C (98.1 °F) 10/13/22 1535   Pulse 82 10/13/22 1610   Resp 19 10/13/22 1610   SpO2 99 % 10/13/22 1610   Vitals shown include unvalidated device data.      Event Time   Out of Recovery 10/13/2022 16:20:59         Pain/Magaly Score: Pain Rating Prior to Med Admin: 6 (10/13/2022  9:14 AM)  Pain Rating Post Med Admin: 0 (10/12/2022  9:55 PM)  Magaly Score: 8 (10/13/2022  4:00 PM)

## 2022-10-13 NOTE — PLAN OF CARE
Patient again transferred to procedural table; continues to have difficulty laying flat despite sedation from anesthesia. Discussed with anesthesiologist and Dr. Dang; will need to medically optimize patient and re-attempt procedure again at a later date. IR team to update primary team on this. Patient to be transported to PACU for sedation recovery; report to be given at the bedside.

## 2022-10-13 NOTE — PLAN OF CARE
Anesthesia and updated procedural consent obtained. Patient transferred to Room 188 to have tunneled line placed with anesthesia.

## 2022-10-13 NOTE — NURSING
Chest xray showed pt's central line not in place anymore. Dobutamine moved to pt's peripheral IV. Heparin gtt had to be stopped d/t unsuccessful in gaining other peripheral IV access. MD Meyers called and made aware. MD said OK to leave heparin gtt off until IV access gained.

## 2022-10-13 NOTE — PT/OT/SLP PROGRESS
Physical Therapy Treatment    Patient Name:  Audrey Oneil Jr.   MRN:  [id number]  Admit Date: 2022  Admitting Diagnosis:  Acute on chronic combined systolic and diastolic heart failure   Length of Stay: 29 days  Recent Surgery: Procedure(s) (LRB):  ULTRASOUND, UPPER GI TRACT, ENDOSCOPIC (N/A) 9 Days Post-Op    Recommendations:     Discharge Recommendations:  home   Discharge Equipment Recommendations: none   Barriers to discharge: None    Plan:     During this hospitalization, patient to be seen 2 x/week to address the listed problems via gait training, therapeutic activities, therapeutic exercises, neuromuscular re-education  Plan of Care Expires:  22  Plan of Care Reviewed with: patient    Assessment:     Audrey Oneil Jr. is a 72 y.o. male admitted with a medical diagnosis of Acute on chronic combined systolic and diastolic heart failure. Pt continues to feel general malaise. Pt reported that he feels like he is getting sick. Pt able to ambulate in hallway with one person assistance. Recommend pt return home once medically stable for discharge.     Problem List: impaired endurance, gait instability, impaired functional mobility, impaired balance, impaired cardiopulmonary response to activity.  Rehab Prognosis: Good     GOALS:   Multidisciplinary Problems       Physical Therapy Goals          Problem: Physical Therapy    Goal Priority Disciplines Outcome Goal Variances Interventions   Physical Therapy Goal     PT, PT/OT Ongoing, Progressing     Description: Goals to be met by: 22    Patient will increase functional independence with mobility by performin. Sit to stand transfer with independence and maintaining sternal precautions- not met  2. Gait  x 300 feet with independence using LRAD as needed- not met  3. Ascend/descend 5 stair with bilateral handrails modified independence and maintaining sternal precautions using LRAD as needed- not met  4. Lower extremity exercise program x15 reps  "per handout, with independence- not met                         Subjective   Communicated with RN prior to session.  Patient found HOB elevated upon PT entry to room, agreeable to evaluation. Audreytrinidad Oneil Jr.'s alone during session.    Chief Complaint: malaise   Patient/Family Comments/goals: to get better and return home   Pain/Comfort:  Location 1:  (general malaise)  Pain Addressed 1: Reposition, Distraction    Objective:   Patient found with: telemetry, central line, peripheral IV, oxygen   General Precautions: Standard, Cardiac fall   Orthopedic Precautions:N/A   Braces: N/A   Oxygen Device: Nasal Cannula  Vitals: /60 (BP Location: Right leg, Patient Position: Lying)   Pulse 95   Temp 98.6 °F (37 °C) (Oral)   Resp 18   Ht 5' 7" (1.702 m)   Wt 90.8 kg (200 lb 2.8 oz)   SpO2 95%   BMI 31.35 kg/m²     Outcome Measures:  AM-PAC 6 CLICK MOBILITY  Turning over in bed (including adjusting bedclothes, sheets and blankets)?: 4  Sitting down on and standing up from a chair with arms (e.g., wheelchair, bedside commode, etc.): 4  Moving from lying on back to sitting on the side of the bed?: 4  Moving to and from a bed to a chair (including a wheelchair)?: 4  Need to walk in hospital room?: 3  Climbing 3-5 steps with a railing?: 3  Basic Mobility Total Score: 22     Functional Mobility:  Additional staff present: N/A  Bed Mobility:   Supine to Sit: independence; HOB elevated  Scooting anteriorly to EOB to have both feet planted on floor: independence    Sitting Balance at Edge of Bed:  Assistance Level Required: [place]    Transfers:   Sit <> Stand Transfer: independence with no assistive device from EOB        Gait:  Patient ambulated: 120ft    Patient required:  SBA-CGA  Patient used: no assistive device  Gait Pattern observed: reciprocal gait  Gait Deviation(s): occasional unsteady gait  Impairments due to:  malaise   Comments:   Mask donned  No overt LOB but pt occasionally unsteady due to malaise "   Mild SOB present  Verbal cuing for upright posture and pacing        Therapeutic Activities, Exercises, and Education:   Educated pt on PT role/POC  Educated pt on importance of OOB activity and daily ambulation   Pt verbalized understanding     Patient left sitting edge of bed with all lines intact, call button in reach, and RN notified..    Time Tracking:     PT Received On: 10/13/22  PT Start Time: 1033     PT Stop Time: 1043  PT Total Time (min): 10 min       Billable Minutes:   Gait Training 10    Treatment Type: Treatment  PT/PTA: PT

## 2022-10-13 NOTE — ANESTHESIA RELEASE NOTE
"Anesthesia Release from PACU Note    Patient: Audrey Oneil Jr.    Procedure(s) Performed: * No procedures listed *    Anesthesia type: MAC    Post pain: Adequate analgesia    Post assessment: no apparent anesthetic complications    Last Vitals:   Visit Vitals  BP (!) 94/53   Pulse 80   Temp 36.7 °C (98.1 °F) (Temporal)   Resp 14   Ht 5' 7" (1.702 m)   Wt 90.8 kg (200 lb 2.8 oz)   SpO2 (!) 93%   BMI 31.35 kg/m²       Post vital signs: stable    Level of consciousness: awake and alert     Nausea/Vomiting: no nausea/no vomiting    Complications: respiratory complications -- pt unable to tolerate the procedure under MAC. Case cancelled for medical optimization     Airway Patency: patent    Respiratory: unassisted    Cardiovascular: stable and blood pressure at baseline    Hydration: euvolemic  "

## 2022-10-13 NOTE — TRANSFER OF CARE
"Anesthesia Transfer of Care Note    Patient: Audrey Oneil Jr.    Procedure(s) Performed: * No procedures listed *    Patient location: PACU    Anesthesia Type: general    Transport from OR: Transported from OR on 2-3 L/min O2 by NC with adequate spontaneous ventilation    Post pain: adequate analgesia    Post assessment: no apparent anesthetic complications    Level of consciousness: awake and alert    Nausea/Vomiting: no nausea/vomiting    Complications: none    Transfer of care protocol was followed      Last vitals:   Visit Vitals  /60 (BP Location: Right leg, Patient Position: Lying)   Pulse 95   Temp 37 °C (98.6 °F) (Oral)   Resp 18   Ht 5' 7" (1.702 m)   Wt 90.8 kg (200 lb 2.8 oz)   SpO2 95%   BMI 31.35 kg/m²     "

## 2022-10-13 NOTE — PLAN OF CARE
Patient remains free from falls and injuries through out shift. NPO at midnight for tunnel cath procedure. Heparin gtt infusing; daily PTTs.  gtt infusing. ABX therapy maintained. Patient AAO and VSS. Patient denies chest pain and SOB. PRN will be taken as needed. Patient's family at bedside. Plan of care reviewed with patient. Patient verbalizes understanding of plan. Will continue to monitor.

## 2022-10-13 NOTE — SUBJECTIVE & OBJECTIVE
Interval History: NAEON. Cr slightly trending up. Held diuretics since 3 days. on   2.5( baseline). Plan for RHC today.     Plan for tunneled catheter insertion tomorrow afternoon.         Continuous Infusions:   sodium chloride 0.9% Stopped (09/28/22 1550)    sodium chloride 0.9% 5 mL/hr at 10/06/22 0105    DOBUTamine IV infusion (non-titrating) 2.5 mcg/kg/min (10/12/22 1944)    heparin (porcine) in D5W Stopped (10/13/22 1215)     Scheduled Meds:   acetaminophen  650 mg Oral QID    allopurinoL  200 mg Oral Daily    amiodarone  300 mg Oral Daily    aspirin  81 mg Oral Daily    atorvastatin  80 mg Oral QHS    DAPTOmycin (CUBICIN) IV (PEDS and ADULTS)  10 mg/kg Intravenous Q24H    famotidine  20 mg Oral Daily    LIDOcaine  1 patch Transdermal Q24H    LIDOcaine HCl 2%  15 mL Oral Once    olopatadine  1 drop Both Eyes Daily    polyethylene glycol  17 g Oral Daily    potassium chloride  40 mEq Oral Once    sucralfate  1 g Oral BID     PRN Meds:sodium chloride, acetaminophen, artificial tears, dextromethorphan-guaiFENesin  mg, heparin (PORCINE), heparin (PORCINE), HYDROcodone-acetaminophen, melatonin, methocarbamoL, ondansetron, senna-docusate 8.6-50 mg, sodium chloride 0.9%    Review of patient's allergies indicates:   Allergen Reactions    Iodine containing multivitamin Swelling     itching    Keflex [cephalexin] Swelling     Eyes.  Tolerated multiple doses of zosyn and 1 dose of augmentin in 2015 and 2016, respectively    Peaches [peach (prunus persica)] Swelling     eyes    Shellfish containing products Swelling    Fig tree Swelling     itching    Tuberculin spenser test ppd Rash     Objective:     Vital Signs (Most Recent):  Temp: 98.6 °F (37 °C) (10/13/22 1121)  Pulse: 89 (10/13/22 1121)  Resp: 16 (10/13/22 1121)  BP: 130/60 (10/13/22 1121)  SpO2: (!) 94 % (10/13/22 1121)   Vital Signs (24h Range):  Temp:  [98.1 °F (36.7 °C)-99.1 °F (37.3 °C)] 98.6 °F (37 °C)  Pulse:  [87-97] 89  Resp:  [16-20] 16  SpO2:  [92  %-98 %] 94 %  BP: (101-140)/(55-64) 130/60     Patient Vitals for the past 72 hrs (Last 3 readings):   Weight   10/13/22 0753 90.8 kg (200 lb 2.8 oz)   10/12/22 0736 89.4 kg (197 lb 1.5 oz)   10/11/22 0804 90.8 kg (200 lb 2.8 oz)       Body mass index is 31.35 kg/m².      Intake/Output Summary (Last 24 hours) at 10/13/2022 1251  Last data filed at 10/13/2022 0400  Gross per 24 hour   Intake 340 ml   Output 200 ml   Net 140 ml           Physical Exam  Constitutional:       General: He is not in acute distress.     Appearance: Normal appearance. He is normal weight. He is not ill-appearing or toxic-appearing.   HENT:      Head: Normocephalic and atraumatic.      Nose: Nose normal. No congestion.      Mouth/Throat:      Mouth: Mucous membranes are moist.      Pharynx: No oropharyngeal exudate.   Eyes:      General:         Right eye: No discharge.         Left eye: No discharge.      Extraocular Movements: Extraocular movements intact.      Pupils: Pupils are equal, round, and reactive to light.   Cardiovascular:      Rate and Rhythm: Normal rate and regular rhythm.      Heart sounds: No murmur heard.    No friction rub. No gallop.   Pulmonary:      Effort: Pulmonary effort is normal. No respiratory distress.      Breath sounds: Normal breath sounds. No wheezing, rhonchi or rales.   Abdominal:      General: Abdomen is flat. Bowel sounds are normal. There is no distension.      Palpations: Abdomen is soft.      Tenderness: There is no abdominal tenderness.   Musculoskeletal:         General: No swelling. Normal range of motion.      Cervical back: Normal range of motion. No rigidity.      Right lower leg: No edema.      Left lower leg: No edema.   Skin:     General: Skin is warm and dry.      Findings: No lesion or rash.   Neurological:      General: No focal deficit present.      Mental Status: He is alert and oriented to person, place, and time.      Cranial Nerves: No cranial nerve deficit.      Motor: No weakness.        Significant Labs:  CBC:  Recent Labs   Lab 10/11/22  0500 10/12/22  0406 10/13/22  0415   WBC 8.70 11.18 7.93   RBC 3.13* 3.30* 3.08*   HGB 8.6* 9.0* 8.4*   HCT 27.6* 29.5* 27.5*    291 264   MCV 88 89 89   MCH 27.5 27.3 27.3   MCHC 31.2* 30.5* 30.5*       BNP:  No results for input(s): BNP in the last 168 hours.    Invalid input(s): BNPTRIAGELBLO  CMP:  Recent Labs   Lab 10/10/22  1334 10/11/22  0500 10/12/22  0406 10/13/22  0415 10/13/22  0925   GLU  --  91 118* 96  --    CALCIUM  --  8.9 8.7 9.0  --    ALBUMIN 2.8*  --   --   --  2.8*   PROT 6.5  --   --   --  6.7   NA  --  136 135* 136  --    K  --  4.0 4.1 4.2  --    CO2  --  30* 27 28  --    CL  --  97 96 98  --    BUN  --  26* 29* 32*  --    CREATININE  --  1.8* 1.9* 2.0*  --    ALKPHOS 86  --   --   --  86   ALT 24  --   --   --  19   AST 20  --   --   --  21   BILITOT 0.6  --   --   --  0.8        Coagulation:   Recent Labs   Lab 10/11/22  0500 10/12/22  0406 10/13/22  0415   APTT 50.7* 39.4* 44.7*       LDH:  No results for input(s): LDH in the last 72 hours.  Microbiology:  Microbiology Results (last 7 days)       ** No results found for the last 168 hours. **            BMP:   Recent Labs   Lab 10/13/22  0415   GLU 96      K 4.2   CL 98   CO2 28   BUN 32*   CREATININE 2.0*   CALCIUM 9.0   MG 2.1       I have reviewed all pertinent labs within the past 24 hours.    Estimated Creatinine Clearance: 36.9 mL/min (A) (based on SCr of 2 mg/dL (H)).    Diagnostic Results:  Reviewed

## 2022-10-13 NOTE — PLAN OF CARE
Pt arrived to IR Room 188 for tunneled PICC placement with anesthesia. Pt oriented to unit and staff. Plan of care reviewed with patient, patient verbalizes understanding. Comfort measures utilized. Pt safely transferred from stretcher to procedural table. Fall risk reviewed with patient, fall risk interventions maintained. Safety strap applied, positioner pillows utilized to minimize pressure points. Blankets applied. Pt prepped and draped utilizing standard sterile technique. Patient placed on continuous monitoring, as required by sedation policy. Timeouts completed utilizing standard universal time-out, per department and facility policy. RN to remain at bedside, continuous monitoring maintained per CRNA. Pt resting comfortably. Denies pain/discomfort. Will continue to monitor. See flow sheets for monitoring, medication administration, and updates.

## 2022-10-13 NOTE — PROGRESS NOTES
[facility] - Cardiology Stepdown  Heart Transplant  Progress Note    Patient Name: Audrey Oneil Jr.  MRN: [id number]  Admission Date: 9/14/2022  Hospital Length of Stay: 29 days  Attending Physician: Migue Henry Jr.,*  Primary Care Provider: Primary Doctor No  Principal Problem:Acute on chronic combined systolic and diastolic heart failure    Subjective:     Interval History: NAEON. Cr slightly trending up. Held diuretics since 3 days. on   2.5( baseline). Plan for RHC today.     Plan for tunneled catheter insertion this afternoon..         Continuous Infusions:   sodium chloride 0.9% Stopped (09/28/22 1550)    sodium chloride 0.9% 5 mL/hr at 10/06/22 0105    DOBUTamine IV infusion (non-titrating) 2.5 mcg/kg/min (10/12/22 1944)    heparin (porcine) in D5W Stopped (10/13/22 1215)     Scheduled Meds:   acetaminophen  650 mg Oral QID    allopurinoL  200 mg Oral Daily    amiodarone  300 mg Oral Daily    aspirin  81 mg Oral Daily    atorvastatin  80 mg Oral QHS    DAPTOmycin (CUBICIN) IV (PEDS and ADULTS)  10 mg/kg Intravenous Q24H    famotidine  20 mg Oral Daily    LIDOcaine  1 patch Transdermal Q24H    LIDOcaine HCl 2%  15 mL Oral Once    olopatadine  1 drop Both Eyes Daily    polyethylene glycol  17 g Oral Daily    potassium chloride  40 mEq Oral Once    sucralfate  1 g Oral BID     PRN Meds:sodium chloride, acetaminophen, artificial tears, dextromethorphan-guaiFENesin  mg, heparin (PORCINE), heparin (PORCINE), HYDROcodone-acetaminophen, melatonin, methocarbamoL, ondansetron, senna-docusate 8.6-50 mg, sodium chloride 0.9%    Review of patient's allergies indicates:   Allergen Reactions    Iodine containing multivitamin Swelling     itching    Keflex [cephalexin] Swelling     Eyes.  Tolerated multiple doses of zosyn and 1 dose of augmentin in 2015 and 2016, respectively    Peaches [peach (prunus persica)] Swelling     eyes    Shellfish containing products Swelling    Fig tree  Swelling     itching    Tuberculin spenser test ppd Rash     Objective:     Vital Signs (Most Recent):  Temp: 98.6 °F (37 °C) (10/13/22 1121)  Pulse: 89 (10/13/22 1121)  Resp: 16 (10/13/22 1121)  BP: 130/60 (10/13/22 1121)  SpO2: (!) 94 % (10/13/22 1121)   Vital Signs (24h Range):  Temp:  [98.1 °F (36.7 °C)-99.1 °F (37.3 °C)] 98.6 °F (37 °C)  Pulse:  [87-97] 89  Resp:  [16-20] 16  SpO2:  [92 %-98 %] 94 %  BP: (101-140)/(55-64) 130/60     Patient Vitals for the past 72 hrs (Last 3 readings):   Weight   10/13/22 0753 90.8 kg (200 lb 2.8 oz)   10/12/22 0736 89.4 kg (197 lb 1.5 oz)   10/11/22 0804 90.8 kg (200 lb 2.8 oz)       Body mass index is 31.35 kg/m².      Intake/Output Summary (Last 24 hours) at 10/13/2022 1251  Last data filed at 10/13/2022 0400  Gross per 24 hour   Intake 340 ml   Output 200 ml   Net 140 ml           Physical Exam  Constitutional:       General: He is not in acute distress.     Appearance: Normal appearance. He is normal weight. He is not ill-appearing or toxic-appearing.   HENT:      Head: Normocephalic and atraumatic.      Nose: Nose normal. No congestion.      Mouth/Throat:      Mouth: Mucous membranes are moist.      Pharynx: No oropharyngeal exudate.   Eyes:      General:         Right eye: No discharge.         Left eye: No discharge.      Extraocular Movements: Extraocular movements intact.      Pupils: Pupils are equal, round, and reactive to light.   Cardiovascular:      Rate and Rhythm: Normal rate and regular rhythm.      Heart sounds: No murmur heard.    No friction rub. No gallop.   Pulmonary:      Effort: Pulmonary effort is normal. No respiratory distress.      Breath sounds: Normal breath sounds. No wheezing, rhonchi or rales.   Abdominal:      General: Abdomen is flat. Bowel sounds are normal. There is no distension.      Palpations: Abdomen is soft.      Tenderness: There is no abdominal tenderness.   Musculoskeletal:         General: No swelling. Normal range of motion.       Cervical back: Normal range of motion. No rigidity.      Right lower leg: No edema.      Left lower leg: No edema.   Skin:     General: Skin is warm and dry.      Findings: No lesion or rash.   Neurological:      General: No focal deficit present.      Mental Status: He is alert and oriented to person, place, and time.      Cranial Nerves: No cranial nerve deficit.      Motor: No weakness.       Significant Labs:  CBC:  Recent Labs   Lab 10/11/22  0500 10/12/22  0406 10/13/22  0415   WBC 8.70 11.18 7.93   RBC 3.13* 3.30* 3.08*   HGB 8.6* 9.0* 8.4*   HCT 27.6* 29.5* 27.5*    291 264   MCV 88 89 89   MCH 27.5 27.3 27.3   MCHC 31.2* 30.5* 30.5*       BNP:  No results for input(s): BNP in the last 168 hours.    Invalid input(s): BNPTRIAGELBLO  CMP:  Recent Labs   Lab 10/10/22  1334 10/11/22  0500 10/12/22  0406 10/13/22  0415 10/13/22  0925   GLU  --  91 118* 96  --    CALCIUM  --  8.9 8.7 9.0  --    ALBUMIN 2.8*  --   --   --  2.8*   PROT 6.5  --   --   --  6.7   NA  --  136 135* 136  --    K  --  4.0 4.1 4.2  --    CO2  --  30* 27 28  --    CL  --  97 96 98  --    BUN  --  26* 29* 32*  --    CREATININE  --  1.8* 1.9* 2.0*  --    ALKPHOS 86  --   --   --  86   ALT 24  --   --   --  19   AST 20  --   --   --  21   BILITOT 0.6  --   --   --  0.8        Coagulation:   Recent Labs   Lab 10/11/22  0500 10/12/22  0406 10/13/22  0415   APTT 50.7* 39.4* 44.7*       LDH:  No results for input(s): LDH in the last 72 hours.  Microbiology:  Microbiology Results (last 7 days)       ** No results found for the last 168 hours. **            BMP:   Recent Labs   Lab 10/13/22  0415   GLU 96      K 4.2   CL 98   CO2 28   BUN 32*   CREATININE 2.0*   CALCIUM 9.0   MG 2.1       I have reviewed all pertinent labs within the past 24 hours.    Estimated Creatinine Clearance: 36.9 mL/min (A) (based on SCr of 2 mg/dL (H)).    Diagnostic Results:  Reviewed     Assessment and Plan:   71 yo WM being worked up for LVAD since  hospitalization January 2022 for recurrent VT (he thinks had MI) and cardiogenic shock at [facility]. He was  later seen in [facility] where he was treated for cardiogenic shock and sent home on .  He remains on  and is pursuing evaluation for LVAD.     His case was discussed at selection committee and he was deferred pending evaluation of pancreatic mass.  They wish to proceed with MRI but not sure with his ICD if this will be possible.Presented with MRSA bacteremia on admission.   HARRY 9/21/22: Not concerning for Vegetations. BCx negative 9/18.      9/27 CRT generator and lead extraction, pocket cx => hypotensive post-procedure requiring Epi, ICU  9/28 off Epi, transfer to CSU  9/29 sudden hypoxemic respiratory failure requiring urgent intubation after sats dropped while laying flat for PICC line => tx ICU, panculture       Acute Hypoxic respiratory failure 9/29/22 -resolved  -suspect aspiration event. Completed 5 day course with zosyn 10/4/22  -intubated and sedated 9/29, extubated 10/1/22  -Saturating well on room air.   -step down to floor 10/6.     * Chronic combined systolic and diastolic congestive heart failure  - On Dobutamine 2.5(baseline)   - please do not uptitrate  just based on MVO2 as his MVO2 is very fluctuant). He gets symptomatic with dizziness, headaches, abd discomfort with  5.   - Diuretics on hold, cr uptrending. Will get RHC today and decide on diuresis.   - Daily weights, Strict I/Os  - Monitor electrolytes and Maintain Mg >2 and K >4  -Telemetry monitoring  -Plan for tunneled cath insertion today( for  abd abx) as pICC not feasible.      Pancreatic lesion  - Gastroenterology on board  - EUS with biopsy  10/4, cytology results -negative for malignant cells, c/w cyst contents.      Bacteremia due to methicillin resistant Staphylococcus aureus  - Monitor WBC count and fever curve, pan-culture if patient spikes  - ID consult and recommendations are appreciated  - On Dapto currently end date  11/11 per ID ( 6 wks after device removal 9/27) for the MRSA bacteremia 9/14, 9/15, 9/16 positive for MRSA. 9/18 NG  - completed 5day  Course of zosyn 10/4/22 for aspiration pneumonia.   -9/30 BCX negative , resp cultures 10/1 NG      H/O ventricular tachycardia  - Amiodarone 300 mg, daily  - Monitor LFTs.         Coronary artery disease involving native coronary artery of native heart without angina pectoris  - Asprin 81 mg, daily  - Atorvastatin 80 mg, nightl     Left Brachial Vein Thrombosis 9/21  - on Heparin . Will consider transitioning to eliquis on d/c     Acute on Chronic Gout  -2nd recurrence 10/8  while in the hospital.( 1st recurrence treated with 5 day course Pred completed 10/6).   -Completed  0.6 mg OD for 4 days 10/12/22.  feels better today.   -On allopurinol 200 mg          Benton Rendon MD  Heart Transplant  [facility] - Cardiology Stepdown

## 2022-10-13 NOTE — NURSING
PT's central line noticed to be out of place when pt came back from IR. Central line was flushed and started leaking. MD Meyers called and chest xray ordered to verify placement.

## 2022-10-13 NOTE — PLAN OF CARE
Patient attempted to move over to procedural table and lay on his back but he started getting short of breath. Attempted to elevate his head using wedge but patient still unable to tolerate. Oxygen increased to 4 L NC for comfort; pulse ox applied and Spo2 97%. Patient moved back over to stretcher; oxygen saturation stable at 99% and oxygen decreased back to 2 L NC. MD notified; will reschedule with anesthesia for later today or tomorrow.     Patient to be re-consented for procedure with anesthesia.

## 2022-10-13 NOTE — PHYSICIAN QUERY
PT Name: Audrey Oneil Jr.  MR #: [id number]    DOCUMENTATION CLARIFICATION     CDS/: Emili Diaz  RN, CCDS             Contact information: [email or web address]    This form is a permanent document in the medical record.     Query Date: October 13, 2022    By submitting this query, we are merely seeking further clarification of documentation.. Please utilize your independent clinical judgment when addressing the question(s) below.    The medical record contains the following:   Indicators  Supporting Clinical Findings Location in Medical Record   X Energy Intake   Per RN documentation, pt consuming 25% meal consumption.    <50% of estimated energy requirement for > 1 week 10/11 RD note   X Weight Loss Weight Loss: 7% x 2 months 10/11 RD note   X Fat Loss Body Fat Depletion: moderate depletion of orbitals and triceps  10/11 RD note   X Muscle Loss Muscle Mass Depletion: moderate depletion of clavicle region, scapular region, and lower extremities  10/11 RD note    Edema/Fluid Accumulation      Reduced  Strength (by dynamometer)     X Weight, BMI, Usual Body Weight BMI (Calculated): 31.3 10/11 RD note    Delayed Wound Healing     X Registered Dietician Diagnosis  Pt meets criteria for Severe Protein Malnutrition in the context of acute illness per ASPEN guidelines   10/11 RD note   X Acute or Chronic Illness  presented with 1 day upper abdominal pain and SOB found to have decompensated HF with progressive hypoxic respiratory failure   Septicemia on admission due to MRSA bacteremia 9/15 prog note      9/19 prog note    Social or Environmental Circumstances      Treatment      Other       Academy of Nutrition and Dietetics (Academy) and the American Society for Parenteral and Enteral Nutrition (A.S.P.E.N.) Clinical Characteristics to support Malnutrition   Malnutrition in the Context of Acute Illness or Injury Malnutrition in the Context of Chronic Illness or Injury Malnutrition in the Context of Social or  Environmental Circumstances   Malnutrition Level Moderate Severe Moderate Severe   Moderate   Severe   Energy Intake <75%                   >7 days <50%                 >5 days <75%           >1 month <75%                      >1 month   <75% for >3 months   <50% for >1 month   Weight Loss   1-2% in 1 week >2% in 1 week 5% in 1 month >5% in 1 month 5% in 1 month >5% in 1 month    5% in 1 month >5% in 1 month 7.5% in 3 months >7.5% in 3 months 7.5% in 3 months >7.5% in 3 months    7.5% in 3 months >7.5% in 3 months 10% in 6 months >10% in 6 months 10% in 6 months >10% in 6 months        20% in 1 year                    >20% in 1 year                                                                  20% in 1 year                            >20% in 1 year                                                  Subcutaneous Fat Loss Mild  Moderate  Mild  Severe    Mild   Severe   Muscle Loss Mild depletion Moderate depletion  Mild depletion Severe Depletion   Mild   Severe   Edema/Fluid Accumulation Mild Moderate to severe  Mild  Severe   Mild   Severe   Reduced  Strength         (based on standards supplied by  of dynamometer) N/A Measurably reduced N/A Measurably reduced N/A Measurably reduced     Criteria for mild malnutrition is defined as 1 characteristic outlined above within the established moderate or severe parameters.  A minimum of 2 out of the 6 characteristics noted above are recommended for a diagnosis of moderate or severe malnutrition.  Chronic illness/injury is a disease/condition lasting 3 months or longer.    The noted clinical guidelines are only system guidelines and do not replace the providers clinical judgment.    Provider, please specify diagnosis or diagnoses associated with above clinical findings.    [  ] Moderate Malnutrition - a minimum of 2 of the 6 moderate malnutrition characteristics noted above    [ xx ] Severe Malnutrition - a minimum of 2 of the 6 severe malnutrition  characteristics noted above    [  ] Malnutrition, Unspecified degree   [  ] Other Nutritional Diagnosis (please specify): _______   [  ] Clinically Undetermined       Please document in your progress notes daily for the duration of treatment until resolved and  include in your discharge summary.      References:    CAMILA España, & SERA Ayala (2022, April). Assessment and management of anorexia and cachexia in palliative care. Retrieved May 23, 2022, from https://www.[employer]/contents/assessment-and-management-of-anorexia-and-cachexia-in-palliative-care?[email or web address]&source=see_link     ZHENG Velasquez, PhD, RD, RAUL, SRINATH Pope, PhD, RN, ANASTASIA Bernal MD, PhD, Arlene DELEON A., MS, RD, [facility], JARVIS Mejias, MS, RD, The Academy Malnutrition Work Group, The A.S.P.E.N. Board of Directors. (2012). Consensus Statement: Academy of Nutrition and Dietetics and American Society for Parenteral and Enteral Nutrition: Characteristics Recommended for the Identification and Documentation of Adult Malnutrition (Undernutrition). Journal of Parenteral and Enteral Nutrition, 36(3), 275-283. doi:10.1177/5796887177607097     Form No. [id number]

## 2022-10-13 NOTE — ANESTHESIA POSTPROCEDURE EVALUATION
Anesthesia Post Evaluation    Patient: Audrey Oneil Jr.    Procedure(s) Performed: * No procedures listed *    Final Anesthesia Type: MAC      Patient location during evaluation: PACU  Patient participation: Yes- Able to Participate  Level of consciousness: awake and alert  Post-procedure vital signs: reviewed and stable  Pain management: adequate  Airway patency: patent    PONV status at discharge: No PONV  Anesthetic complications: yes (pt unable to tolerate the procedure under sedation -- given the procedure as not urgent decision was made to cancel the case so the patient could be medically optimized )  Perioperative Events: other periop events (see comments)        Cardiovascular status: blood pressure returned to baseline  Respiratory status: unassisted  Hydration status: euvolemic  Follow-up not needed.          Vitals Value Taken Time   BP 94/53 10/13/22 1601   Temp 36.7 °C (98.1 °F) 10/13/22 1535   Pulse 82 10/13/22 1610   Resp 19 10/13/22 1610   SpO2 99 % 10/13/22 1610   Vitals shown include unvalidated device data.      Event Time   Out of Recovery 10/13/2022 16:20:59         Pain/Magaly Score: Pain Rating Prior to Med Admin: 6 (10/13/2022  9:14 AM)  Pain Rating Post Med Admin: 0 (10/12/2022  9:55 PM)  Magaly Score: 8 (10/13/2022  4:00 PM)

## 2022-10-13 NOTE — PROGRESS NOTES
Interdisciplinary Rounds Report:   Attended interdisciplinary rounds with the [facility]/CTS services including the LVAD Coordinators, social workers, cardiologists, surgeons,  PT/OT/Speech, dietician, and unit charge nurses. Discussed patient status, plan of care, goals of care, including DC date, and post discharge needs. Plan of care will be discussed with the patient and/or family per the physician while rounding on the floor. This is a recurring meeting that is medically and socially necessary to collaborate with the interdisciplinary team to assist patient needs and safe discharge.

## 2022-10-13 NOTE — PROGRESS NOTES
Update    Pt presents as AAO x4, calm, cooperative, and asking and answering questions appropriately, caregivers not present. Pt states he is ready to get his RHC done! LCSW offered support and encouragement. No additional needs or questions were addressed during visit. SW providing ongoing psychosocial, counseling, & emotional support, education, resources, assistance, and discharge planning as indicated.  SW to continue to follow.

## 2022-10-14 NOTE — PLAN OF CARE
"Plan of care discussed with family and patient. Throughout shift Heart Transplant Team currently aware of Heparin not infusing, central line not in place, unable to complete task efficiently "patient unable to lie flat" Oral Bumex administered as ordered. Since administration of medication, patient only able to urinate 80ml, with increased Dobutamine as ordered. Patient started complaining of "flushed syndrome" reassessed vitals within normal limits and contacted on call provider. Advised the patient and his family at bedside that physician will be coming to evaluate him shortly. Will continue to monitor closely.   "

## 2022-10-14 NOTE — TRANSFER OF CARE
"Anesthesia Transfer of Care Note    Patient: Audrey Oneil Jr.    Procedure(s) Performed: * No procedures listed *    Patient location: PACU    Anesthesia Type: MAC    Transport from OR: Transported from OR on 6-10 L/min O2 by face mask with adequate spontaneous ventilation    Post pain: adequate analgesia    Post assessment: no apparent anesthetic complications and tolerated procedure well    Post vital signs: stable    Level of consciousness: awake, alert and oriented    Nausea/Vomiting: no nausea/vomiting    Complications: none    Transfer of care protocol was followed      Last vitals:   Visit Vitals  /63   Pulse 93   Temp 37.1 °C (98.8 °F) (Oral)   Resp 14   Ht 5' 7" (1.702 m)   Wt 90.8 kg (200 lb 2.8 oz)   SpO2 96%   BMI 31.35 kg/m²     "

## 2022-10-14 NOTE — ANESTHESIA PREPROCEDURE EVALUATION
10/14/2022  Pre-operative evaluation for * No procedures listed *    Audrey Oneil Jr. is a 72 y.o. male  PMHx ICM, recurrent VT (on amio) admitted recently for cardiogenic shock and is on home Dobutamine 2.5 mcg/kg/min undergoing workup for LVAD now admitted 9/14 for ADHF and found to have MRSA, on Daptomycin. S/p acute hypoxic resp failure during attempted PICC placement 9/29, extubated 10/1.   IR consulted for tunneled line placement for dobutamine drip.      Dobutamine increased to 3.75 today (couldn't get tunneled cath yesterday as he was SOB)     TTE 9/2022   The left ventricle is mildly enlarged with severely decreased systolic function. The estimated ejection fraction is 15%.   Normal right ventricular size with normal right ventricular systolic function.   No interatrial septal defect present.   Mild mitral regurgitation.   Normal appearing left atrial appendage. No thrombus is present in the appendage. Normal appendage velocities.   Trivial pericardial effusion at baseline and unchanged following successful extraction of 3 pacemaker leads.       Patient Active Problem List   Diagnosis    Coronary artery disease involving native coronary artery of native heart without angina pectoris    Chronic combined systolic and diastolic heart failure    Obesity (BMI 30.0-34.9)    Cardiomyopathy, ischemic    Hx pulmonary embolism 2010 provoked dvt     Postural dizziness with presyncope    Acute hypoxemic respiratory failure    History of DVT (deep vein thrombosis)    Ventricular tachycardia    Hypertensive cardiovascular-renal disease, stage 1-4 or unspecified chronic kidney disease, with heart failure    Presence of cardiac resynchronization therapy defibrillator (CRT-D)    Mixed hyperlipidemia    Long term current use of amiodarone    Thoracic aortic atherosclerosis    Stage 3a chronic  kidney disease    Prediabetes    LBBB (left bundle branch block)    Elevated troponin I level    Gout    Acute on chronic combined systolic and diastolic heart failure    H/O ventricular tachycardia    Hypoxia    Vitamin D deficiency    Blue skin    MRSA infection    Rotator cuff syndrome    Severe sepsis    Pressure injury of heel, stage 2    Dermatitis associated with moisture    Skin breakdown    Leukocytosis    Bacteremia due to methicillin resistant Staphylococcus aureus    MRSA bacteremia    Pancreatic lesion    Cellulitis of left forearm    Acute thrombosis of left brachial vein    Vasogenic shock    SSS (sick sinus syndrome)    Ischemic cardiomyopathy    Encounter for weaning from ventilator       Review of patient's allergies indicates:   Allergen Reactions    Iodine containing multivitamin Swelling     itching    Keflex [cephalexin] Swelling     Eyes.  Tolerated multiple doses of zosyn and 1 dose of augmentin in 2015 and 2016, respectively    Peaches [peach (prunus persica)] Swelling     eyes    Shellfish containing products Swelling    Fig tree Swelling     itching    Tuberculin spenser test ppd Rash       No current facility-administered medications on file prior to encounter.     Current Outpatient Medications on File Prior to Encounter   Medication Sig Dispense Refill    allopurinoL (ZYLOPRIM) 100 MG tablet Take 2 tablets (200 mg total) by mouth once daily. 60 tablet 0    amiodarone (PACERONE) 200 MG Tab TAKE 1 AND 1/2 TABLETS(300 MG) BY MOUTH EVERY  tablet 3    aspirin (ECOTRIN) 81 MG EC tablet Take 1 tablet (81 mg total) by mouth once daily. 90 tablet 3    atorvastatin (LIPITOR) 80 MG tablet Take 1 tablet (80 mg total) by mouth once daily. 90 tablet 1    DOBUTamine (DOBUTREX) 500 mg/250 mL (2,000 mcg/mL) 2.5 mcg/kg/min  Patient is 95.7 kg 250 mL 5    furosemide (LASIX) 40 MG tablet Take 1 tablet (40 mg total) by mouth daily as needed (Weight gain of 3 lbs in  one day or 5 lbs in one week). 30 tablet 11    HYDROcodone-acetaminophen (NORCO)  mg per tablet Take 1 tablet by mouth every 6 (six) hours.      JARDIANCE 25 mg tablet Take 1 tablet (25 mg total) by mouth once daily. 30 tablet 5    ondansetron (ZOFRAN-ODT) 4 MG TbDL Dissolve 1 tablet (4 mg total) by mouth every 6 (six) hours as needed (nausea). 30 tablet 1    polyethylene glycol (GOLYTELY) 236-22.74-6.74 -5.86 gram suspension SMARTSIG:Milliliter(s) By Mouth      sacubitriL-valsartan (ENTRESTO) 49-51 mg per tablet Take 1 tablet by mouth 2 (two) times daily. 60 tablet 3    simethicone (MYLICON) 80 MG chewable tablet Take 1 tablet (80 mg total) by mouth every 6 (six) hours as needed for Flatulence. 60 tablet 1    spironolactone (ALDACTONE) 25 MG tablet Take 1 tablet (25 mg total) by mouth once daily. 30 tablet 3    [DISCONTINUED] clopidogreL (PLAVIX) 75 mg tablet Take 1 tablet (75 mg total) by mouth once daily. (Patient not taking: No sig reported) 90 tablet 3    [DISCONTINUED] colchicine (COLCRYS) 0.6 mg tablet Take 1 tablet (0.6 mg total) by mouth once daily. (Patient not taking: Reported on 3/18/2022) 90 tablet 1    [DISCONTINUED] ipratropium (ATROVENT) 0.02 % nebulizer solution Take 2.5 mLs (500 mcg total) by nebulization 4 (four) times daily as needed for Wheezing. Rescue (Patient not taking: Reported on 3/29/2022) 75 mL 0    [DISCONTINUED] metoprolol succinate (TOPROL-XL) 25 MG 24 hr tablet Take 1 tablet (25 mg total) by mouth once daily. 90 tablet 3    [DISCONTINUED] midodrine (PROAMATINE) 2.5 MG Tab Take 1 tablet (2.5 mg total) by mouth 3 (three) times daily. HOLD until follow up with cardiology 90 tablet 0       Past Surgical History:   Procedure Laterality Date    APPENDECTOMY      BACK SURGERY      CARDIAC DEFIBRILLATOR PLACEMENT      CARDIAC SURGERY  2007    stent    CARPAL TUNNEL RELEASE Right 04/2018    COLONOSCOPY N/A 8/8/2022    Procedure: COLONOSCOPY;  Surgeon: Sascha MILLER  MD Ree;  Location: [facility] ENDO (2ND FLR);  Service: Endoscopy;  Laterality: N/A;  EF-15%  pre heart    AICD-St Rodri       [place]OVID [place] [facility] [place] prep-clears 4 hrs prior-tb    ENDOSCOPIC ULTRASOUND OF UPPER GASTROINTESTINAL TRACT N/A 10/4/2022    Procedure: ULTRASOUND, UPPER GI TRACT, ENDOSCOPIC;  Surgeon: Charlie Smith MD;  Location: [facility] ENDO (2ND FLR);  Service: Endoscopy;  Laterality: N/A;    INSERTION OF BIVENTRICULAR IMPLANTABLE CARDIOVERTER-DEFIBRILLATOR (ICD) N/A 5/3/2019    Procedure: INSERTION, ICD, BIVENTRICULAR;  Surgeon: Teofilo Pal MD;  Location: [facility] EP LAB;  Service: Cardiology;  Laterality: N/A;  ICH CM,  ICD UPGD BI-V,  SJM, MAC, FAS, 3PREP (dual ICD INSITU)    r knee scope      REVISION OF SKIN POCKET FOR CARDIOVERTER-DEFIBRILLATOR Left 5/3/2019    Procedure: Revision, Skin Pocket, For Cardioverter-Defibrillator;  Surgeon: Teofilo Pal MD;  Location: [facility] EP LAB;  Service: Cardiology;  Laterality: Left;    RIGHT HEART CATHETERIZATION Right 2021    Procedure: INSERTION, CATHETER, RIGHT HEART;  Surgeon: Lizz Nieto MD;  Location: [facility] CATH LAB;  Service: Cardiology;  Laterality: Right;    RIGHT HEART CATHETERIZATION Right 2022    Procedure: INSERTION, CATHETER, RIGHT HEART;  Surgeon: Migue Henry Jr., MD;  Location: [facility] CATH LAB;  Service: Cardiology;  Laterality: Right;    SPINE SURGERY      TONSILLECTOMY      TRIGGER FINGER RELEASE Right 2018    thumb       Social History     Socioeconomic History    Marital status:     Number of children: 2   Occupational History    Occupation: [employer]     Comment: unemployed   Tobacco Use    Smoking status: Former     Packs/day: 1.00     Years: 45.00     Pack years: 45.00     Types: Cigarettes     Quit date: 2005     Years since quittin.8    Smokeless tobacco: Never    Tobacco comments:     1-1.5 ppd every day.   Substance and Sexual Activity    Alcohol use: No    Drug  use: No    Sexual activity: Not Currently         CBC:   Recent Labs     10/13/22  0415 10/14/22  0400   WBC 7.93 9.88   RBC 3.08* 3.12*   HGB 8.4* 8.8*   HCT 27.5* 27.9*    253   MCV 89 89   MCH 27.3 28.2   MCHC 30.5* 31.5*       CMP:   Recent Labs     10/13/22  0415 10/13/22  0925 10/14/22  0400     --  135*   K 4.2  --  4.3   CL 98  --  98   CO2 28  --  25   BUN 32*  --  31*   CREATININE 2.0*  --  2.1*   GLU 96  --  105   MG 2.1  --  2.1   CALCIUM 9.0  --  9.0   ALBUMIN  --  2.8*  --    PROT  --  6.7  --    ALKPHOS  --  86  --    ALT  --  19  --    AST  --  21  --    BILITOT  --  0.8  --        INR  Recent Labs     10/12/22  0406 10/13/22  0415 10/14/22  0400   APTT 39.4* 44.7* 28.1           Diagnostic Studies:      EKD Echo:  Results for orders placed or performed during the hospital encounter of 18   2D echo with color flow doppler   Result Value Ref Range    EF + QEF 20 (A) 55 - 65    Mitral Valve Regurgitation TRIVIAL     Diastolic Dysfunction Yes (A)     Est. PA Systolic Pressure 25.28     Tricuspid Valve Regurgitation TRIVIAL TO MILD          Pre-op Assessment    I have reviewed the Patient Summary Reports.     I have reviewed the Nursing Notes. I have reviewed the NPO Status.   I have reviewed the Medications.     Review of Systems  Cardiovascular:   Hypertension Past MI CAD  Dysrhythmias  CHF    Renal/:   Chronic Renal Disease        Physical Exam  General: Well nourished and Cooperative    Airway:  Mallampati: II   Mouth Opening: Normal  TM Distance: Normal  Tongue: Normal  Neck ROM: Normal ROM    Chest/Lungs:  Clear to auscultation, Normal Respiratory Rate    Heart:  Rate: Normal  Rhythm: Regular Rhythm  Sounds: Normal        Anesthesia Plan  Type of Anesthesia, risks & benefits discussed:    Anesthesia Type: Gen Natural Airway, MAC  Intra-op Monitoring Plan: Standard ASA Monitors  Post Op Pain Control Plan: multimodal analgesia and IV/PO Opioids PRN  Induction:   IV  Airway Plan: , Post-Induction  Informed Consent: Informed consent signed with the Patient and all parties understand the risks and agree with anesthesia plan.  All questions answered.   ASA Score: 4    Ready For Surgery From Anesthesia Perspective.     .

## 2022-10-14 NOTE — PROGRESS NOTES
Discharge Planning     LCSW got a call from [facility] who state Pt has been assigned to [facility]. SW providing ongoing psychosocial, counseling, & emotional support, education, resources, assistance, and discharge planning as indicated.  SW to continue to follow.

## 2022-10-14 NOTE — PROGRESS NOTES
[facility] - Cardiology Stepdown  Heart Transplant  Progress Note    Patient Name: Audrey Oneil Jr.  MRN: [id number]  Admission Date: 9/14/2022  Hospital Length of Stay: 30 days  Attending Physician: Migue Henry Jr.,*  Primary Care Provider: Primary Doctor No  Principal Problem:Acute on chronic combined systolic and diastolic heart failure    Subjective:     Interval History: Cr trending up. Held diuretics since 4 days.   Couldn't get the tunneled cath insertion and RHC  yesterday, as he was SOB.  Increased  to 3.75( from 2.5 which is his baseliine), and received 2mg bumex .  Urine output 180 ml since 7pm -7am last night.     Plan for tunneled PICC placement today.         Continuous Infusions:   sodium chloride 0.9% Stopped (09/28/22 1550)    sodium chloride 0.9% Stopped (10/13/22 1524)    DOBUTamine IV infusion (non-titrating) 3.75 mcg/kg/min (10/14/22 1158)    heparin (porcine) in D5W 19 Units/kg/hr (10/13/22 1711)     Scheduled Meds:   acetaminophen  650 mg Oral QID    allopurinoL  200 mg Oral Daily    amiodarone  300 mg Oral Daily    aspirin  81 mg Oral Daily    atorvastatin  80 mg Oral QHS    DAPTOmycin (CUBICIN) IV (PEDS and ADULTS)  10 mg/kg Intravenous Q24H    famotidine  20 mg Oral Daily    LIDOcaine  1 patch Transdermal Q24H    LIDOcaine HCl 2%  15 mL Oral Once    olopatadine  1 drop Both Eyes Daily    polyethylene glycol  17 g Oral Daily    potassium chloride  40 mEq Oral Once    sucralfate  1 g Oral BID     PRN Meds:sodium chloride, acetaminophen, artificial tears, dextromethorphan-guaiFENesin  mg, heparin (PORCINE), heparin (PORCINE), HYDROcodone-acetaminophen, melatonin, methocarbamoL, ondansetron, senna-docusate 8.6-50 mg, sodium chloride 0.9%, sodium chloride 0.9%    Review of patient's allergies indicates:   Allergen Reactions    Iodine containing multivitamin Swelling     itching    Keflex [cephalexin] Swelling     Eyes.  Tolerated multiple doses of zosyn and 1 dose of augmentin in  2015 and 2016, respectively    Peaches [peach (prunus persica)] Swelling     eyes    Shellfish containing products Swelling    Fig tree Swelling     itching    Tuberculin spenser test ppd Rash     Objective:     Vital Signs (Most Recent):  Temp: 98.8 °F (37.1 °C) (10/14/22 1300)  Pulse: 93 (10/14/22 1300)  Resp: 14 (10/14/22 1300)  BP: 114/62 (10/14/22 1300)  SpO2: 96 % (10/14/22 1300)   Vital Signs (24h Range):  Temp:  [97.5 °F (36.4 °C)-98.8 °F (37.1 °C)] 98.8 °F (37.1 °C)  Pulse:  [] 93  Resp:  [14-24] 14  SpO2:  [93 %-100 %] 96 %  BP: ()/(52-67) 114/62     Patient Vitals for the past 72 hrs (Last 3 readings):   Weight   10/13/22 0753 90.8 kg (200 lb 2.8 oz)   10/12/22 0736 89.4 kg (197 lb 1.5 oz)       Body mass index is 31.35 kg/m².      Intake/Output Summary (Last 24 hours) at 10/14/2022 1452  Last data filed at 10/14/2022 0356  Gross per 24 hour   Intake 150 ml   Output 405 ml   Net -255 ml           Physical Exam  Constitutional:       General: He is not in acute distress.     Appearance: Normal appearance. He is normal weight. He is not ill-appearing or toxic-appearing.   HENT:      Head: Normocephalic and atraumatic.      Nose: Nose normal. No congestion.      Mouth/Throat:      Mouth: Mucous membranes are moist.      Pharynx: No oropharyngeal exudate.   Eyes:      General:         Right eye: No discharge.         Left eye: No discharge.      Extraocular Movements: Extraocular movements intact.      Pupils: Pupils are equal, round, and reactive to light.   Cardiovascular:      Rate and Rhythm: Normal rate and regular rhythm.      Heart sounds: No murmur heard.    No friction rub. No gallop.   Pulmonary:      Effort: Pulmonary effort is normal. No respiratory distress.      Breath sounds: Normal breath sounds. No wheezing, rhonchi or rales.   Abdominal:      General: Abdomen is flat. Bowel sounds are normal. There is no distension.      Palpations: Abdomen is soft.      Tenderness: There is no  abdominal tenderness.   Musculoskeletal:         General: No swelling. Normal range of motion.      Cervical back: Normal range of motion. No rigidity.      Right lower leg: No edema.      Left lower leg: No edema.   Skin:     General: Skin is warm and dry.      Findings: No lesion or rash.   Neurological:      General: No focal deficit present.      Mental Status: He is alert and oriented to person, place, and time.      Cranial Nerves: No cranial nerve deficit.      Motor: No weakness.       Significant Labs:  CBC:  Recent Labs   Lab 10/12/22  0406 10/13/22  0415 10/14/22  0400   WBC 11.18 7.93 9.88   RBC 3.30* 3.08* 3.12*   HGB 9.0* 8.4* 8.8*   HCT 29.5* 27.5* 27.9*    264 253   MCV 89 89 89   MCH 27.3 27.3 28.2   MCHC 30.5* 30.5* 31.5*       BNP:  No results for input(s): BNP in the last 168 hours.    Invalid input(s): BNPTRIAGELBLO  CMP:  Recent Labs   Lab 10/10/22  1334 10/11/22  0500 10/12/22  0406 10/13/22  0415 10/13/22  0925 10/14/22  0400   GLU  --    < > 118* 96  --  105   CALCIUM  --    < > 8.7 9.0  --  9.0   ALBUMIN 2.8*  --   --   --  2.8*  --    PROT 6.5  --   --   --  6.7  --    NA  --    < > 135* 136  --  135*   K  --    < > 4.1 4.2  --  4.3   CO2  --    < > 27 28  --  25   CL  --    < > 96 98  --  98   BUN  --    < > 29* 32*  --  31*   CREATININE  --    < > 1.9* 2.0*  --  2.1*   ALKPHOS 86  --   --   --  86  --    ALT 24  --   --   --  19  --    AST 20  --   --   --  21  --    BILITOT 0.6  --   --   --  0.8  --     < > = values in this interval not displayed.        Coagulation:   Recent Labs   Lab 10/12/22  0406 10/13/22  0415 10/14/22  0400   APTT 39.4* 44.7* 28.1       LDH:  No results for input(s): LDH in the last 72 hours.  Microbiology:  Microbiology Results (last 7 days)       ** No results found for the last 168 hours. **            BMP:   Recent Labs   Lab 10/14/22  0400      *   K 4.3   CL 98   CO2 25   BUN 31*   CREATININE 2.1*   CALCIUM 9.0   MG 2.1       I have  reviewed all pertinent labs within the past 24 hours.    Estimated Creatinine Clearance: 35.2 mL/min (A) (based on SCr of 2.1 mg/dL (H)).    Diagnostic Results:  Reviewed     Assessment and Plan:   71 yo WM being worked up for LVAD since hospitalization January 2022 for recurrent VT (he thinks had MI) and cardiogenic shock at [facility]. He was  later seen in [facility] where he was treated for cardiogenic shock and sent home on .  He remains on  and is pursuing evaluation for LVAD.     His case was discussed at selection committee and he was deferred pending evaluation of pancreatic mass.  They wish to proceed with MRI but not sure with his ICD if this will be possible.Presented with MRSA bacteremia on admission.   HARRY 9/21/22: Not concerning for Vegetations. BCx negative 9/18.      9/27 CRT generator and lead extraction, pocket cx => hypotensive post-procedure requiring Epi, ICU  9/28 off Epi, transfer to CSU  9/29 sudden hypoxemic respiratory failure requiring urgent intubation after sats dropped while laying flat for PICC line => tx ICU, panculture       Acute Hypoxic respiratory failure 9/29/22 -resolved  -suspect aspiration event. Completed 5 day course with zosyn 10/4/22  -intubated and sedated 9/29, extubated 10/1/22  -Saturating well on room air.   -step down to floor 10/6.     * Chronic combined systolic and diastolic congestive heart failure  - On Dobutamine 3.75( increased from 2.5 last night). Also received Bumex   - cr uptrending. Plan was to get RHC yesterday in the setting of worsening cr , but pt was sob on lying flat and had to give bumex and increase his .   - Daily weights, Strict I/Os  - Monitor electrolytes and Maintain Mg >2 and K >4  -Telemetry monitoring  -Plan for tunneled PICC insertion today( for  abd abx).       Pancreatic lesion  - Gastroenterology on board  - EUS with biopsy  10/4, cytology results -negative for malignant cells, c/w cyst contents.      Bacteremia due to methicillin  resistant Staphylococcus aureus  - Monitor WBC count and fever curve, pan-culture if patient spikes  - ID consult and recommendations are appreciated  - On Dapto currently end date 11/11 per ID ( 6 wks after device removal 9/27) for the MRSA bacteremia 9/14, 9/15, 9/16 positive for MRSA. 9/18 NG  - completed 5day  Course of zosyn 10/4/22 for aspiration pneumonia.   -9/30 BCX negative , resp cultures 10/1 NG      H/O ventricular tachycardia  - Amiodarone 300 mg, daily  - Monitor LFTs.         Coronary artery disease involving native coronary artery of native heart without angina pectoris  - Asprin 81 mg, daily  - Atorvastatin 80 mg, nightl     Left Brachial Vein Thrombosis 9/21  - on Heparin . Will consider transitioning to eliquis on d/c     Acute on Chronic Gout  -2nd recurrence 10/8  while in the hospital.( 1st recurrence treated with 5 day course Pred completed 10/6).   -Completed  0.6 mg OD for 4 days 10/12/22.  feels better today.   -On allopurinol 200 mg     Benton Rendon MD  Heart Transplant  [facility] - Cardiology Stepdown

## 2022-10-14 NOTE — PLAN OF CARE
Pt to be transferred ot PACU 15 for 1 hr recovery before returning to inpt room. Transferred via bed w/ CRNA & RN

## 2022-10-14 NOTE — SUBJECTIVE & OBJECTIVE
Interval History: Cr trending up. Held diuretics since 4 days.             Continuous Infusions:   sodium chloride 0.9% Stopped (09/28/22 1550)    sodium chloride 0.9% Stopped (10/13/22 1524)    DOBUTamine IV infusion (non-titrating) 3.75 mcg/kg/min (10/14/22 1158)    heparin (porcine) in D5W 19 Units/kg/hr (10/13/22 1711)     Scheduled Meds:   acetaminophen  650 mg Oral QID    allopurinoL  200 mg Oral Daily    amiodarone  300 mg Oral Daily    aspirin  81 mg Oral Daily    atorvastatin  80 mg Oral QHS    DAPTOmycin (CUBICIN) IV (PEDS and ADULTS)  10 mg/kg Intravenous Q24H    famotidine  20 mg Oral Daily    LIDOcaine  1 patch Transdermal Q24H    LIDOcaine HCl 2%  15 mL Oral Once    olopatadine  1 drop Both Eyes Daily    polyethylene glycol  17 g Oral Daily    potassium chloride  40 mEq Oral Once    sucralfate  1 g Oral BID     PRN Meds:sodium chloride, acetaminophen, artificial tears, dextromethorphan-guaiFENesin  mg, heparin (PORCINE), heparin (PORCINE), HYDROcodone-acetaminophen, melatonin, methocarbamoL, ondansetron, senna-docusate 8.6-50 mg, sodium chloride 0.9%, sodium chloride 0.9%    Review of patient's allergies indicates:   Allergen Reactions    Iodine containing multivitamin Swelling     itching    Keflex [cephalexin] Swelling     Eyes.  Tolerated multiple doses of zosyn and 1 dose of augmentin in 2015 and 2016, respectively    Peaches [peach (prunus persica)] Swelling     eyes    Shellfish containing products Swelling    Fig tree Swelling     itching    Tuberculin spenser test ppd Rash     Objective:     Vital Signs (Most Recent):  Temp: 98.8 °F (37.1 °C) (10/14/22 1300)  Pulse: 93 (10/14/22 1300)  Resp: 14 (10/14/22 1300)  BP: 114/62 (10/14/22 1300)  SpO2: 96 % (10/14/22 1300)   Vital Signs (24h Range):  Temp:  [97.5 °F (36.4 °C)-98.8 °F (37.1 °C)] 98.8 °F (37.1 °C)  Pulse:  [] 93  Resp:  [14-24] 14  SpO2:  [93 %-100 %] 96 %  BP: ()/(52-67) 114/62     Patient Vitals for the past 72 hrs (Last  3 readings):   Weight   10/13/22 0753 90.8 kg (200 lb 2.8 oz)   10/12/22 0736 89.4 kg (197 lb 1.5 oz)       Body mass index is 31.35 kg/m².      Intake/Output Summary (Last 24 hours) at 10/14/2022 1452  Last data filed at 10/14/2022 0356  Gross per 24 hour   Intake 150 ml   Output 405 ml   Net -255 ml           Physical Exam  Constitutional:       General: He is not in acute distress.     Appearance: Normal appearance. He is normal weight. He is not ill-appearing or toxic-appearing.   HENT:      Head: Normocephalic and atraumatic.      Nose: Nose normal. No congestion.      Mouth/Throat:      Mouth: Mucous membranes are moist.      Pharynx: No oropharyngeal exudate.   Eyes:      General:         Right eye: No discharge.         Left eye: No discharge.      Extraocular Movements: Extraocular movements intact.      Pupils: Pupils are equal, round, and reactive to light.   Cardiovascular:      Rate and Rhythm: Normal rate and regular rhythm.      Heart sounds: No murmur heard.    No friction rub. No gallop.   Pulmonary:      Effort: Pulmonary effort is normal. No respiratory distress.      Breath sounds: Normal breath sounds. No wheezing, rhonchi or rales.   Abdominal:      General: Abdomen is flat. Bowel sounds are normal. There is no distension.      Palpations: Abdomen is soft.      Tenderness: There is no abdominal tenderness.   Musculoskeletal:         General: No swelling. Normal range of motion.      Cervical back: Normal range of motion. No rigidity.      Right lower leg: No edema.      Left lower leg: No edema.   Skin:     General: Skin is warm and dry.      Findings: No lesion or rash.   Neurological:      General: No focal deficit present.      Mental Status: He is alert and oriented to person, place, and time.      Cranial Nerves: No cranial nerve deficit.      Motor: No weakness.       Significant Labs:  CBC:  Recent Labs   Lab 10/12/22  0406 10/13/22  0415 10/14/22  0400   WBC 11.18 7.93 9.88   RBC 3.30*  3.08* 3.12*   HGB 9.0* 8.4* 8.8*   HCT 29.5* 27.5* 27.9*    264 253   MCV 89 89 89   MCH 27.3 27.3 28.2   MCHC 30.5* 30.5* 31.5*       BNP:  No results for input(s): BNP in the last 168 hours.    Invalid input(s): BNPTRIAGELBLO  CMP:  Recent Labs   Lab 10/10/22  1334 10/11/22  0500 10/12/22  0406 10/13/22  0415 10/13/22  0925 10/14/22  0400   GLU  --    < > 118* 96  --  105   CALCIUM  --    < > 8.7 9.0  --  9.0   ALBUMIN 2.8*  --   --   --  2.8*  --    PROT 6.5  --   --   --  6.7  --    NA  --    < > 135* 136  --  135*   K  --    < > 4.1 4.2  --  4.3   CO2  --    < > 27 28  --  25   CL  --    < > 96 98  --  98   BUN  --    < > 29* 32*  --  31*   CREATININE  --    < > 1.9* 2.0*  --  2.1*   ALKPHOS 86  --   --   --  86  --    ALT 24  --   --   --  19  --    AST 20  --   --   --  21  --    BILITOT 0.6  --   --   --  0.8  --     < > = values in this interval not displayed.        Coagulation:   Recent Labs   Lab 10/12/22  0406 10/13/22  0415 10/14/22  0400   APTT 39.4* 44.7* 28.1       LDH:  No results for input(s): LDH in the last 72 hours.  Microbiology:  Microbiology Results (last 7 days)       ** No results found for the last 168 hours. **            BMP:   Recent Labs   Lab 10/14/22  0400      *   K 4.3   CL 98   CO2 25   BUN 31*   CREATININE 2.1*   CALCIUM 9.0   MG 2.1       I have reviewed all pertinent labs within the past 24 hours.    Estimated Creatinine Clearance: 35.2 mL/min (A) (based on SCr of 2.1 mg/dL (H)).    Diagnostic Results:  Reviewed

## 2022-10-14 NOTE — PLAN OF CARE
Pt arrived to IR room 188 for tunneled line placement with anesthesia. Pt oriented to unit and staff. Plan of care reviewed with patient, patient verbalizes understanding. Comfort measures utilized. Pt safely transferred from stretcher to procedural table. Fall risk reviewed with patient, fall risk interventions maintained. Safety strap applied, positioner pillows utilized to minimize pressure points. Blankets applied. Pt prepped and draped utilizing standard sterile technique. Patient placed on continuous monitoring, as required by sedation policy. Timeouts completed utilizing standard universal time-out, per department and facility policy. RN to remain at bedside, continuous monitoring maintained per CRNA. Pt resting comfortably. Denies pain/discomfort. Will continue to monitor. See flow sheets for monitoring, medication administration, and updates.

## 2022-10-14 NOTE — NURSING
Spoke with pharmacy regarding dobutamine running through midline. Midline is only access right now. Pharmacy said OK to run through midline for short amount of time until central access in gained. PT to go to IR for central line placement.

## 2022-10-14 NOTE — CONSULTS
Single lumen 18g x 10 cm midline placed to right brachial vein. Max dwell date 11/12/22, Lot# [id number].  Needle advanced into the vessel under real time ultrasound guidance.  Image recorded and saved.

## 2022-10-14 NOTE — PT/OT/SLP PROGRESS
Occupational Therapy   Treatment    Name: Audrey Oneil Jr.  MRN: [id number]  Admitting Diagnosis:  Acute on chronic combined systolic and diastolic heart failure  10 Days Post-Op    Recommendations:     Discharge Recommendations: home  Discharge Equipment Recommendations:  none  Barriers to discharge:  None    Assessment:     Audrey Oneil Jr. is a 72 y.o. male with a medical diagnosis of Acute on chronic combined systolic and diastolic heart failure.  He presents significantly distraught stating he's not going to get better and he's going to die in the hospital. Pt denies need to talk to someone professionally. Pt is able to complete functional transfers, ADL and mobility with minimal to no assistance, but fatigues easily. Performance deficits affecting function are weakness, impaired functional mobility, gait instability, impaired endurance, impaired self care skills, impaired balance, impaired cardiopulmonary response to activity.     Rehab Prognosis:  Good; patient would benefit from acute skilled OT services to address these deficits and reach maximum level of function.       Plan:     Patient to be seen 2 x/week to address the above listed problems via self-care/home management, therapeutic activities, therapeutic exercises  Plan of Care Expires: 11/02/22  Plan of Care Reviewed with: patient    Subjective     Pain/Comfort:  Pain Rating 1: 0/10  Pain Rating Post-Intervention 1: 0/10    Objective:     Communicated with: RN prior to session.  Patient found sitting edge of bed with central line, telemetry, peripheral IV upon OT entry to room.    General Precautions: Standard, fall   Orthopedic Precautions:N/A   Braces:    Respiratory Status: Room air     Occupational Performance:     Bed Mobility:    NT     Functional Mobility/Transfers:  Patient completed Sit <> Stand Transfer with independence  with  no assistive device   Functional Mobility: SBA around room and in hallway to simulate HH distances  without AD,  but with rest breaks    Activities of Daily Living:  Grooming: independence    Upper Body Dressing: stand by assistance    Lower Body Dressing: stand by assistance        AMPAC 6 Click ADL: 21    Treatment & Education:  Pt ed on OT POC  Pt ed on safety and energy conservation    Patient left sitting EOB with all lines intact, call button in reach, and RN notified    GOALS:   Multidisciplinary Problems       Occupational Therapy Goals          Problem: Occupational Therapy    Goal Priority Disciplines Outcome Interventions   Occupational Therapy Goal     OT, PT/OT Ongoing, Progressing    Description: Goals to be met by: 10/13/22     Patient will increase functional independence with ADLs by performing:    UE Dressing with Supervision.  LE Dressing with Supervision.  Grooming while standing at sink with Supervision.  Toileting from toilet with Supervision for hygiene and clothing management.   Toilet transfer to toilet with Supervision.                         Time Tracking:     OT Date of Treatment: 10/14/22  OT Start Time: 0858  OT Stop Time: 0923  OT Total Time (min): 25 min    Billable Minutes:Self Care/Home Management 15  Therapeutic Activity 8               10/14/2022

## 2022-10-15 NOTE — PLAN OF CARE
Cardiac ICU Care Plan    POC reviewed with Audrey Oneil Jr. and family. Questions and concerns addressed. Pt progressing toward goals. Will continue to monitor. See below and flowsheets for full assessment and VS info.       Neuro:  [place] Coma Scale  Best Eye Response: 4-->(E4) spontaneous  Best Motor Response: 6-->(M6) obeys commands  Best Verbal Response: 5-->(V5) oriented  [place] Coma Scale Score: 15  Assessment Qualifiers: patient not sedated/intubated  Pupil PERRLA: yes    24 hr Temp:  [97.9 °F (36.6 °C)-98.8 °F (37.1 °C)]      CV:  Rhythm: normal sinus rhythm   DVT prophylaxis: VTE Required Core Measure: Pharmacological prophylaxis initiated/maintained    CVP (mean): 11 mmHg (10/14/22 2112)    [REMOVED] PICC Double Lumen 09/22/22 1507 right basilic-Current Insertion Depth (cm): 36 cm (09/22/22 1507)  SVO2 (%): (S) 61 % (10/03/22 1600)               Pulses  Right Radial Pulse: 2+ (normal)  Left Radial Pulse: 2+ (normal)  Right Dorsalis Pedis Pulse: 1+ (weak)  Left Dorsalis Pedis Pulse: 1+ (weak)  Right Posterior Tibial Pulse: 1+ (weak)  Left Posterior Tibial Pulse: 1+ (weak)    Resp:  O2 Device (Oxygen Therapy): nasal cannula  Flow (L/min): 2  Vent Mode: Spont  Set Rate: 20 BPM  Oxygen Concentration (%): 28  Vt Set: 500 mL  PEEP/CPAP: 5 cmH20  Pressure Support: 4 cmH20    GI/:  GI prophylaxis: yes  Diet/Nutrition Received: NPO  Last Bowel Movement: 10/12/22  Voiding Characteristics: voids spontaneously without difficulty  [REMOVED]      Urethral Catheter 09/27/22 7407 Double-lumen 16 Fr.-Reason for Continuing Urinary Catheterization: Post operative, Critically ill in ICU and requiring hourly monitoring of intake/output   Intake/Output Summary (Last 24 hours) at 10/15/2022 0678  Last data filed at 10/15/2022 0502  Gross per 24 hour   Intake 250 ml   Output 225 ml   Net 25 ml        Nutritional Supplement Intake: Quantity none, Type: Boost    Labs/Accuchecks:  Recent Labs   Lab 10/13/22  4685  10/14/22  0400 10/15/22  0329   WBC 7.93 9.88 9.82   RBC 3.08* 3.12* 2.99*   HGB 8.4* 8.8* 8.4*   HCT 27.5* 27.9* 26.4*    253 304      Recent Labs   Lab 10/14/22  0400 10/15/22  0105 10/15/22  0329   APTT 28.1 41.6* 40.8*      Recent Labs     10/13/22  0925 10/14/22  0400 10/15/22  0329   NA  --    < > 134*   K  --    < > 4.1   CO2  --    < > 26   CL  --    < > 95   BUN  --    < > 33*   CREATININE  --    < > 2.1*   ALKPHOS 86  --   --    ALT 19  --   --    AST 21  --   --    BILITOT 0.8  --   --     < > = values in this interval not displayed.     No results for input(s): CPK, CPKMB, MB, TROPONINI in the last 168 hours.   Recent Labs     10/14/22  2132 10/15/22  0113 10/15/22  0402   PH 7.396 7.394 7.400   PCO2 48.5* 45.4* 45.7*   PO2 26* 29* 26*   HCO3 29.7* 27.8 28.3*   POCSATURATED 47* 54* 48*   BE 5 3 4       Electrolytes: N/A - electrolytes WDL  Accuchecks: ACHS    Gtts/LDAs:   sodium chloride 0.9% Stopped (09/28/22 1550)    sodium chloride 0.9% 5 mL/hr at 10/15/22 0024    DOBUTamine IV infusion (non-titrating) 3.75 mcg/kg/min (10/15/22 0410)    EPINEPHrine 0.02 mcg/kg/min (10/14/22 8318)    heparin (porcine) in D5W 19 Units/kg/hr (10/14/22 1859)       Lines/Drains/Airways       Central Venous Catheter Line  Duration             Percutaneous Central Line Insertion/Assessment - Double Lumen  10/14/22 1801 right internal jugular <1 day              Peripheral Intravenous Line  Duration                  Peripheral IV - Single Lumen 10/06/22 0430 20 G Posterior;Right Forearm 9 days         Midline Catheter Insertion/Assessment  - Single Lumen 10/14/22 1100 Right brachial vein 18g x 10cm <1 day         Peripheral IV - Single Lumen 10/14/22 1743 24 G Right;Posterior Hand <1 day                    Skin/Wounds  Bathing/Skin Care: electrode patches/site rotation (10/14/22 1017)  Wounds: No  Wound care consulted: No

## 2022-10-15 NOTE — CARE UPDATE
Patient's CVP 11 and SVO2 47. UO minimal. Transferred to the ICU to start epi.       Plan discussed w/ HTS attending.

## 2022-10-15 NOTE — NURSING TRANSFER
Nursing Transfer Note      10/14/2022       Transfer To: [id number]      Transfer via bed    Transfer with cardiac monitoring, 2L nc3    Transported by PCT x2    Medicines sent:  gtt, Heparin gtt        Chart send with patient: Yes    Notified:  No contact listed but notified family was at bedside on CSU         85

## 2022-10-16 NOTE — PROGRESS NOTES
[facility] - Cardiac Intensive Care  Heart Transplant  Progress Note    Patient Name: Audrey Oneil Jr.  MRN: [id number]  Admission Date: 9/14/2022  Hospital Length of Stay: 32 days  Attending Physician: Migue Henry Jr.,*  Primary Care Provider: Primary Doctor No  Principal Problem:Acute on chronic combined systolic and diastolic heart failure    Subjective:     Interval History: Pt remains in the ICU. Noted to have temp of 100.1. He reports generalized fatigue and SOB. Increase Lasix gtt to 40 mg/hr and UOP improved.     Hemodynamics     SVO2: 46  CVP: 11  CO: 5  CI: 2.72  SVR: 1231     Continuous Infusions:   sodium chloride 0.9% Stopped (09/28/22 1550)    sodium chloride 0.9% 5 mL/hr at 10/15/22 1051    DOBUTamine IV infusion (non-titrating) 3.75 mcg/kg/min (10/16/22 1600)    EPINEPHrine 0.06 mcg/kg/min (10/16/22 1600)    furosemide (LASIX) 10 mg/mL infusion (non-titrating) 40 mg/hr (10/16/22 1600)    heparin (porcine) in D5W 19 Units/kg/hr (10/16/22 1600)     Scheduled Meds:   acetaminophen  650 mg Oral QID    allopurinoL  200 mg Oral Daily    amiodarone  300 mg Oral Daily    aspirin  81 mg Oral Daily    atorvastatin  80 mg Oral QHS    azithromycin  500 mg Oral Daily    DAPTOmycin (CUBICIN) IV (PEDS and ADULTS)  10 mg/kg Intravenous Q24H    famotidine  20 mg Oral Daily    LIDOcaine  1 patch Transdermal Q24H    LIDOcaine HCl 2%  15 mL Oral Once    olopatadine  1 drop Both Eyes Daily    piperacillin-tazobactam (ZOSYN) IVPB  4.5 g Intravenous Q8H    polyethylene glycol  17 g Oral Daily    potassium chloride  40 mEq Oral Once    sucralfate  1 g Oral BID     PRN Meds:sodium chloride, acetaminophen, artificial tears, dextromethorphan-guaiFENesin  mg, heparin (PORCINE), heparin (PORCINE), HYDROcodone-acetaminophen, melatonin, methocarbamoL, ondansetron, senna-docusate 8.6-50 mg, sodium chloride 0.9%    Review of patient's allergies indicates:   Allergen Reactions    Iodine containing multivitamin Swelling      itching    Keflex [cephalexin] Swelling     Eyes.  Tolerated multiple doses of zosyn and 1 dose of augmentin in 2015 and 2016, respectively    Peaches [peach (prunus persica)] Swelling     eyes    Shellfish containing products Swelling    Fig tree Swelling     itching    Tuberculin spenser test ppd Rash     Objective:     Vital Signs (Most Recent):  Temp: 98.4 °F (36.9 °C) (10/16/22 1500)  Pulse: 94 (10/16/22 1528)  Resp: (!) 21 (10/16/22 1528)  BP: 93/68 (10/16/22 1500)  SpO2: 97 % (10/16/22 1528)   Vital Signs (24h Range):  Temp:  [97.9 °F (36.6 °C)-100.1 °F (37.8 °C)] 98.4 °F (36.9 °C)  Pulse:  [] 94  Resp:  [19-51] 21  SpO2:  [91 %-100 %] 97 %  BP: ()/(52-84) 93/68     No data found.  Body mass index is 31.35 kg/m².      Intake/Output Summary (Last 24 hours) at 10/16/2022 1638  Last data filed at 10/16/2022 1600  Gross per 24 hour   Intake 1283.12 ml   Output 3950 ml   Net -2666.88 ml         Physical Exam  Constitutional:       General: He is not in acute distress.     Appearance: Normal appearance. He is normal weight. He is not ill-appearing or toxic-appearing.   HENT:      Head: Normocephalic and atraumatic.      Nose: Nose normal. No congestion.      Mouth/Throat:      Mouth: Mucous membranes are moist.      Pharynx: No oropharyngeal exudate.   Eyes:      General:         Right eye: No discharge.         Left eye: No discharge.      Extraocular Movements: Extraocular movements intact.      Pupils: Pupils are equal, round, and reactive to light.   Cardiovascular:      Rate and Rhythm: Normal rate and regular rhythm.      Heart sounds: No murmur heard.    No friction rub. No gallop.   Pulmonary:      Effort: Pulmonary effort is normal. No respiratory distress.      Breath sounds: Normal breath sounds. No wheezing, rhonchi or rales.   Abdominal:      General: Abdomen is flat. Bowel sounds are normal. There is no distension.      Palpations: Abdomen is soft.      Tenderness: There is no abdominal  tenderness.   Musculoskeletal:         General: No swelling. Normal range of motion.      Cervical back: Normal range of motion. No rigidity.      Right lower leg: Edema present.      Left lower leg: Edema present.   Skin:     General: Skin is warm and dry.      Findings: No lesion or rash.   Neurological:      General: No focal deficit present.      Mental Status: He is alert and oriented to person, place, and time.      Cranial Nerves: No cranial nerve deficit.      Motor: No weakness.       Significant Labs:  CBC:  Recent Labs   Lab 10/14/22  0400 10/15/22  0329 10/15/22  0849 10/16/22  0400   WBC 9.88 9.82  --  14.66*   RBC 3.12* 2.99*  --  3.14*   HGB 8.8* 8.4*  --  8.8*   HCT 27.9* 26.4* 33* 27.3*    304  --  410   MCV 89 88  --  87   MCH 28.2 28.1  --  28.0   MCHC 31.5* 31.8*  --  32.2     BNP:  No results for input(s): BNP in the last 168 hours.    Invalid input(s): BNPTRIAGELBLO  CMP:  Recent Labs   Lab 10/10/22  1334 10/11/22  0500 10/13/22  0925 10/14/22  0400 10/15/22  0329 10/15/22  1256 10/16/22  0400   GLU  --    < >  --    < > 146* 147* 157*   CALCIUM  --    < >  --    < > 8.6* 8.7 8.7   ALBUMIN 2.8*  --  2.8*  --   --   --   --    PROT 6.5  --  6.7  --   --   --   --    NA  --    < >  --    < > 134* 134* 132*   K  --    < >  --    < > 4.1 4.1 4.0   CO2  --    < >  --    < > 26 26 29   CL  --    < >  --    < > 95 95 89*   BUN  --    < >  --    < > 33* 34* 31*   CREATININE  --    < >  --    < > 2.1* 2.0* 1.9*   ALKPHOS 86  --  86  --   --   --   --    ALT 24  --  19  --   --   --   --    AST 20  --  21  --   --   --   --    BILITOT 0.6  --  0.8  --   --   --   --     < > = values in this interval not displayed.      Coagulation:   Recent Labs   Lab 10/15/22  0105 10/15/22  0329 10/16/22  0400   APTT 41.6* 40.8* 40.2*     LDH:  No results for input(s): LDH in the last 72 hours.  Microbiology:  Microbiology Results (last 7 days)       Procedure Component Value Units Date/Time    Respiratory  Infection Panel (PCR), Nasopharyngeal [[id number]] Collected: 10/16/22 1254    Order Status: Completed Specimen: Nasopharyngeal Swab Updated: 10/16/22 1555     Respiratory Infection Panel Source NP Swab     Adenovirus Not Detected     Coronavirus 229E, Common Cold Virus Not Detected     Coronavirus HKU1, Common Cold Virus Not Detected     Coronavirus NL63, Common Cold Virus Not Detected     Coronavirus OC43, Common Cold Virus Not Detected     Comment: The Coronavirus strains detected in this test cause the common cold.  These strains are not the COVID-19 (novel Coronavirus)strain   associated with the respiratory disease outbreak.          SARS-CoV2 (COVID-19) Qualitative PCR Not Detected     Human Metapneumovirus Not Detected     Human Rhinovirus/Enterovirus Not Detected     Influenza A (subtypes H1, H1-2009,H3) Not Detected     Influenza B Not Detected     Parainfluenza Virus 1 Not Detected     Parainfluenza Virus 2 Not Detected     Parainfluenza Virus 3 Not Detected     Parainfluenza Virus 4 Not Detected     Respiratory Syncytial Virus Not Detected     Bordetella Parapertussis (IK6653) Not Detected     Bordetella pertussis (ptxP) Not Detected     Chlamydia pneumoniae Not Detected     Mycoplasma pneumoniae Not Detected    Narrative:      For all other respiratory sources, order [id number] -  Respiratory Viral Panel by PCR    Blood culture [[id number]] Collected: 10/16/22 1530    Order Status: Sent Specimen: Blood from Peripheral, Antecubital, Right Updated: 10/16/22 1536    Blood culture [[id number]] Collected: 10/16/22 1531    Order Status: Sent Specimen: Blood from Peripheral, Hand, Right Updated: 10/16/22 1536    Influenza A & B by Molecular [[id number]] Collected: 10/16/22 1300    Order Status: Completed Specimen: Nasopharyngeal Swab Updated: 10/16/22 1441     Influenza A, Molecular Negative     Influenza B, Molecular Negative     Flu A & B Source Nasal swab    Culture, Respiratory with Gram Stain [[id number]]      Order Status: No result Specimen: Respiratory             BMP:   Recent Labs   Lab 10/16/22  0400   *   *   K 4.0   CL 89*   CO2 29   BUN 31*   CREATININE 1.9*   CALCIUM 8.7   MG 1.8     I have reviewed all pertinent labs within the past 24 hours.    Estimated Creatinine Clearance: 38.9 mL/min (A) (based on SCr of 1.9 mg/dL (H)).    Diagnostic Results:  Reviewed     Assessment and Plan:     71 yo WM being worked up for LVAD since hospitalization January 2022 for recurrent VT (he thinks had MI) and cardiogenic shock at [facility]. He was  later seen in [facility] where he was treated for cardiogenic shock and sent home on .  He remains on  and is pursuing evaluation for LVAD.     His case was discussed at selection committee and he was deferred pending evaluation of pancreatic mass.  They wish to proceed with MRI but not sure with his ICD if this will be possible.Presented with MRSA bacteremia on admission.   HARRY 9/21/22: Not concerning for Vegetations. BCx negative 9/18.      9/27 CRT generator and lead extraction, pocket cx => hypotensive post-procedure requiring Epi, ICU  9/28 off Epi, transfer to CSU  9/29 sudden hypoxemic respiratory failure requiring urgent intubation after sats dropped while laying flat for PICC line => tx ICU, panculture         * Chronic combined systolic and diastolic congestive heart failure  - On Dobutamine 3.75( increased from 2.5 ).   - cr uptrending. Plan was to get RHC 10/14 in the setting of worsening cr , but pt was sob on lying flat and had to give bumex and increase his .   - Daily weights, Strict I/Os  - Monitor electrolytes and Maintain Mg >2 and K >4  -Telemetry monitoring  -S/p  tunneled PICC insertion   - Epi started 10/15 and increased to .06   - Lasix restarted 10/15 given decreased UOP. Now increased to 40 mg/hr with improved UOP  - Daily weights, Strict I/Os     Leukocytosis   -Pt noted to have temp of 100.1 and WBC up to 14.5   -BCX, UA/UCX, respiratory panel  and viral panel ordered   -Zosyn, azithromycin started      Acute Hypoxic respiratory failure 9/29/22 -resolved  -suspect aspiration event. Completed 5 day course with zosyn 10/4/22  -intubated and sedated 9/29, extubated 10/1/22  -Saturating well on room air.   -step down to floor 10/6.     Pancreatic lesion  - Gastroenterology on board  - EUS with biopsy  10/4, cytology results -negative for malignant cells, c/w cyst contents.      Bacteremia due to methicillin resistant Staphylococcus aureus  - Monitor WBC count and fever curve, pan-culture if patient spikes  - ID consult and recommendations are appreciated  - On Dapto currently end date 11/11 per ID ( 6 wks after device removal 9/27) for the MRSA bacteremia 9/14, 9/15, 9/16 positive for MRSA. 9/18 NG  - completed 5day  Course of zosyn 10/4/22 for aspiration pneumonia.   -9/30 BCX negative , resp cultures 10/1 NG      H/O ventricular tachycardia  - Amiodarone 300 mg, daily  - Monitor LFTs.      Coronary artery disease involving native coronary artery of native heart without angina pectoris  - Asprin 81 mg, daily  - Atorvastatin 80 mg, nightly     Left Brachial Vein Thrombosis 9/21  - on Heparin . Will consider transitioning to eliquis on d/c     Acute on Chronic Gout  -2nd recurrence 10/8  while in the hospital.( 1st recurrence treated with 5 day course Pred completed 10/6).   -Completed  0.6 mg OD for 4 days 10/12/22.  feels better today.   -On allopurinol 200 mg        Curry Mendoza MD  Heart Transplant  [facility] - Cardiac Int[facility]

## 2022-10-16 NOTE — SUBJECTIVE & OBJECTIVE
Interval History: Overnight pt stepped up to ICU given worsening UOP and given patient's CVP 11 and SVO2 47.Pt started on epi.     Hemodynamics Care Update Note    SVO2: 48  CVP: 11  CO: 4  CI: 2.8  SVR: 1167     Continuous Infusions:   sodium chloride 0.9% Stopped (09/28/22 1550)    sodium chloride 0.9% 5 mL/hr at 10/15/22 1051    DOBUTamine IV infusion (non-titrating) 3.75 mcg/kg/min (10/15/22 1800)    EPINEPHrine 0.04 mcg/kg/min (10/15/22 1800)    furosemide (LASIX) 10 mg/mL infusion (non-titrating) 20 mg/hr (10/15/22 1800)    heparin (porcine) in D5W 19 Units/kg/hr (10/15/22 1800)     Scheduled Meds:   acetaminophen  650 mg Oral QID    allopurinoL  200 mg Oral Daily    amiodarone  300 mg Oral Daily    aspirin  81 mg Oral Daily    atorvastatin  80 mg Oral QHS    DAPTOmycin (CUBICIN) IV (PEDS and ADULTS)  10 mg/kg Intravenous Q24H    famotidine  20 mg Oral Daily    LIDOcaine  1 patch Transdermal Q24H    LIDOcaine HCl 2%  15 mL Oral Once    olopatadine  1 drop Both Eyes Daily    polyethylene glycol  17 g Oral Daily    potassium chloride  40 mEq Oral Once    sucralfate  1 g Oral BID     PRN Meds:sodium chloride, acetaminophen, artificial tears, dextromethorphan-guaiFENesin  mg, heparin (PORCINE), heparin (PORCINE), HYDROcodone-acetaminophen, melatonin, methocarbamoL, ondansetron, senna-docusate 8.6-50 mg, sodium chloride 0.9%    Review of patient's allergies indicates:   Allergen Reactions    Iodine containing multivitamin Swelling     itching    Keflex [cephalexin] Swelling     Eyes.  Tolerated multiple doses of zosyn and 1 dose of augmentin in 2015 and 2016, respectively    Peaches [peach (prunus persica)] Swelling     eyes    Shellfish containing products Swelling    Fig tree Swelling     itching    Tuberculin spenser test ppd Rash     Objective:     Vital Signs (Most Recent):  Temp: 97.6 °F (36.4 °C) (10/15/22 1500)  Pulse: 98 (10/15/22 1801)  Resp: (!) 31 (10/15/22 1801)  BP: 108/79 (10/15/22 1801)  SpO2:  100 % (10/15/22 1801)   Vital Signs (24h Range):  Temp:  [97.6 °F (36.4 °C)-98.8 °F (37.1 °C)] 97.6 °F (36.4 °C)  Pulse:  [] 98  Resp:  [18-61] 31  SpO2:  [89 %-100 %] 100 %  BP: (100-165)/(57-82) 108/79     Patient Vitals for the past 72 hrs (Last 3 readings):   Weight   10/13/22 0753 90.8 kg (200 lb 2.8 oz)     Body mass index is 31.35 kg/m².      Intake/Output Summary (Last 24 hours) at 10/15/2022 1915  Last data filed at 10/15/2022 1800  Gross per 24 hour   Intake 2552.67 ml   Output 1510 ml   Net 1042.67 ml       Physical Exam  Constitutional:       General: He is not in acute distress.     Appearance: Normal appearance. He is normal weight. He is not ill-appearing or toxic-appearing.   HENT:      Head: Normocephalic and atraumatic.      Nose: Nose normal. No congestion.      Mouth/Throat:      Mouth: Mucous membranes are moist.      Pharynx: No oropharyngeal exudate.   Eyes:      General:         Right eye: No discharge.         Left eye: No discharge.      Extraocular Movements: Extraocular movements intact.      Pupils: Pupils are equal, round, and reactive to light.   Cardiovascular:      Rate and Rhythm: Normal rate and regular rhythm.      Heart sounds: No murmur heard.    No friction rub. No gallop.   Pulmonary:      Effort: Pulmonary effort is normal. No respiratory distress.      Breath sounds: Normal breath sounds. No wheezing, rhonchi or rales.   Abdominal:      General: Abdomen is flat. Bowel sounds are normal. There is no distension.      Palpations: Abdomen is soft.      Tenderness: There is no abdominal tenderness.   Musculoskeletal:         General: No swelling. Normal range of motion.      Cervical back: Normal range of motion. No rigidity.      Right lower leg: Edema present.      Left lower leg: Edema present.   Skin:     General: Skin is warm and dry.      Findings: No lesion or rash.   Neurological:      General: No focal deficit present.      Mental Status: He is alert and oriented  to person, place, and time.      Cranial Nerves: No cranial nerve deficit.      Motor: No weakness.       Significant Labs:  CBC:  Recent Labs   Lab 10/13/22  0415 10/14/22  0400 10/15/22  0329 10/15/22  0849   WBC 7.93 9.88 9.82  --    RBC 3.08* 3.12* 2.99*  --    HGB 8.4* 8.8* 8.4*  --    HCT 27.5* 27.9* 26.4* 33*    253 304  --    MCV 89 89 88  --    MCH 27.3 28.2 28.1  --    MCHC 30.5* 31.5* 31.8*  --      BNP:  No results for input(s): BNP in the last 168 hours.    Invalid input(s): BNPTRIAGELBLO  CMP:  Recent Labs   Lab 10/10/22  1334 10/11/22  0500 10/13/22  0925 10/14/22  0400 10/15/22  0329 10/15/22  1256   GLU  --    < >  --  105 146* 147*   CALCIUM  --    < >  --  9.0 8.6* 8.7   ALBUMIN 2.8*  --  2.8*  --   --   --    PROT 6.5  --  6.7  --   --   --    NA  --    < >  --  135* 134* 134*   K  --    < >  --  4.3 4.1 4.1   CO2  --    < >  --  25 26 26   CL  --    < >  --  98 95 95   BUN  --    < >  --  31* 33* 34*   CREATININE  --    < >  --  2.1* 2.1* 2.0*   ALKPHOS 86  --  86  --   --   --    ALT 24  --  19  --   --   --    AST 20  --  21  --   --   --    BILITOT 0.6  --  0.8  --   --   --     < > = values in this interval not displayed.      Coagulation:   Recent Labs   Lab 10/14/22  0400 10/15/22  0105 10/15/22  0329   APTT 28.1 41.6* 40.8*     LDH:  No results for input(s): LDH in the last 72 hours.  Microbiology:  Microbiology Results (last 7 days)       ** No results found for the last 168 hours. **            BMP:   Recent Labs   Lab 10/15/22  1256   *   *   K 4.1   CL 95   CO2 26   BUN 34*   CREATININE 2.0*   CALCIUM 8.7   MG 2.0     I have reviewed all pertinent labs within the past 24 hours.    Estimated Creatinine Clearance: 36.9 mL/min (A) (based on SCr of 2 mg/dL (H)).    Diagnostic Results:  Reviewed

## 2022-10-16 NOTE — PLAN OF CARE
Cardiac ICU Care Plan    POC reviewed with Audrey Oneil Jr. and family.  Patient still remains on epi, , lasix and heparin gtt. Last CVP of 9 and SvO2 of 54. Questions and concerns addressed. No acute events today. Pt progressing toward goals. Will continue to monitor. See below and flowsheets for full assessment and VS info.       Neuro:  [place] Coma Scale  Best Eye Response: 4-->(E4) spontaneous  Best Motor Response: 6-->(M6) obeys commands  Best Verbal Response: 5-->(V5) oriented  Lai Coma Scale Score: 15  Assessment Qualifiers: patient not sedated/intubated  Pupil PERRLA: yes    24 hr Temp:  [97.9 °F (36.6 °C)-100.1 °F (37.8 °C)]      CV:  Rhythm: sinus tachycardia   DVT prophylaxis: VTE Required Core Measure: Pharmacological prophylaxis initiated/maintained    CVP (mean): 347 mmHg (10/16/22 1800)    [REMOVED] PICC Double Lumen 09/22/22 1507 right basilic-Current Insertion Depth (cm): 36 cm (09/22/22 1507)  SVO2 (%): 46 % (10/16/22 0400)               Pulses  Right Radial Pulse: 2+ (normal)  Left Radial Pulse: 2+ (normal)  Right Dorsalis Pedis Pulse: 1+ (weak)  Left Dorsalis Pedis Pulse: 1+ (weak)  Right Posterior Tibial Pulse: 1+ (weak)  Left Posterior Tibial Pulse: 1+ (weak)    Resp:  O2 Device (Oxygen Therapy): nasal cannula  Flow (L/min): 3.5  Vent Mode: Spont  Set Rate: 20 BPM  Oxygen Concentration (%): 28  Vt Set: 500 mL  PEEP/CPAP: 5 cmH20  Pressure Support: 4 cmH20    GI/:  GI prophylaxis: yes  Diet/Nutrition Received: restrict fluids, low saturated fat/low cholesterol  Last Bowel Movement: 10/14/22  Voiding Characteristics: voids spontaneously without difficulty   Intake/Output Summary (Last 24 hours) at 10/16/2022 1819  Last data filed at 10/16/2022 1813  Gross per 24 hour   Intake 2094.31 ml   Output 4350 ml   Net -2255.69 ml        Nutritional Supplement Intake: Quantity boost, Type: Boost    Labs/Accuchecks:  Recent Labs   Lab 10/14/22  0400 10/15/22  0329 10/15/22  0849 10/16/22  0400    WBC 9.88 9.82  --  14.66*   RBC 3.12* 2.99*  --  3.14*   HGB 8.8* 8.4*  --  8.8*   HCT 27.9* 26.4* 33* 27.3*    304  --  410      Recent Labs   Lab 10/15/22  0105 10/15/22  0329 10/16/22  0400   APTT 41.6* 40.8* 40.2*      Recent Labs     10/16/22  0400   *   K 4.0   CO2 29   CL 89*   BUN 31*   CREATININE 1.9*     No results for input(s): CPK, CPKMB, MB, TROPONINI in the last 168 hours.   Recent Labs     10/15/22  1945 10/16/22  0356 10/16/22  1528   PH 7.411 7.442 7.434   PCO2 48.5* 48.0* 49.1*   PO2 27* 25* 28*   HCO3 30.8* 32.8* 32.9*   POCSATURATED 50* 46* 54*   BE 6 9 9       Electrolytes: Electrolytes replaced  Accuchecks: none    Gtts/LDAs:   sodium chloride 0.9% Stopped (09/28/22 1550)    sodium chloride 0.9% 5 mL/hr at 10/15/22 1051    DOBUTamine IV infusion (non-titrating) 3.75 mcg/kg/min (10/16/22 1700)    EPINEPHrine 0.06 mcg/kg/min (10/16/22 1812)    furosemide (LASIX) 10 mg/mL infusion (non-titrating) 40 mg/hr (10/16/22 1700)    heparin (porcine) in D5W 19 Units/kg/hr (10/16/22 1700)       Lines/Drains/Airways       Central Venous Catheter Line  Duration             Tunneled Central Line Insertion/Assessment - Triple Lumen  10/14/22 1800 right subclavian 2 days              Peripheral Intravenous Line  Duration                  Midline Catheter Insertion/Assessment  - Single Lumen 10/14/22 1100 Right brachial vein 18g x 10cm 2 days                    Skin/Wounds  Bathing/Skin Care: dressed/undressed;electrode patches/site rotation;linen changed (10/15/22 0701)  Wounds: No  Wound care consulted: No   Problem: Adult Inpatient Plan of Care  Goal: Patient-Specific Goal (Individualized)  Outcome: Ongoing, Progressing  Goal: Optimal Comfort and Wellbeing  Outcome: Ongoing, Progressing     Problem: Cardiac Output Decreased (Heart Failure)  Goal: Optimal Cardiac Output  Outcome: Ongoing, Progressing

## 2022-10-16 NOTE — PLAN OF CARE
CICU DAILY GOALS       A: Awake    RASS: Goal - RASS Goal: 0-->alert and calm  Actual - RASS (Kilpatrick Agitation-Sedation Scale): 0-->alert and calm   Restraint necessity: Clinical Justification: Treatment Interference, Removing medical devices  B: Breath   SBT: Not intubated   C: Coordinate A & B, analgesics/sedatives   Pain: managed    SAT: Not intubated  D: Delirium   CAM-ICU: Overall CAM-ICU: Negative  E: Early(intubated/ Progressive (non-intubated) Mobility   MOVE Screen: Pass   Activity: Activity Management: Sitting at edge of bed - L2  FAS: Feeding/Nutrition   Diet order: Diet/Nutrition Received: 2 gram sodium, restrict fluids,   Fluid restriction: Fluid Requirement: 1500ml FR   Nutritional Supplement Intake: Quantity 0, Type:  patient did not drink any boosts today  T: Thrombus   DVT prophylaxis: VTE Required Core Measure: Pharmacological prophylaxis initiated/maintained  H: HOB Elevation   Head of Bed (HOB) Positioning: other (see comments)  U: Ulcer Prophylaxis   GI: no  G: Glucose control   managed Glycemic Management: blood glucose monitored  S: Skin   Bundle compliance: yes   Bathing/Skin Care: dressed/undressed, electrode patches/site rotation, linen changed Date: 10/15  B: Bowel Function   no issues   I: Indwelling Catheters   Seay necessity: [REMOVED]      Urethral Catheter 09/27/22 0755 Double-lumen 16 Fr.-Reason for Continuing Urinary Catheterization: Post operative, Critically ill in ICU and requiring hourly monitoring of intake/output  [REMOVED]      Urethral Catheter 09/29/22 1600-Reason for Continuing Urinary Catheterization: Urinary retention, Critically ill in ICU and requiring hourly monitoring of intake/output   CVC necessity: Yes   IPAD offered: Not appropriate  D: De-escalation Antibx   No  Plan for the day   Continue to optimize, iv infusions dobutamine 3.75, epi .04, heparin, lasix   Family/Goals of care/Code Status   Code Status: Full Code     No acute events throughout day, VS and  assessment per flow sheet, patient progressing towards goals as tolerated, plan of care reviewed with Audrey Oneil Jr. and family, all concerns addressed, will continue to monitor. No acute events throughout day, VS and assessment per flow sheet, see below for updates:    Pulmonary: 2L NC, SOB when lying flat     Cardiovascular: sr 80's    Neurological: aao X4 afebrile moves all extremities    Gastrointestinal: cardiac diet fluid restr     Genitourinary: clear yellow; urinal    Endocrine: wnl    Skin/Bath: see doc flow   Date of last CHG bath given: 10/15 refused    Infusions: dobutamine 3.75, epi .04, heparin, lasix    Patient progressing towards goals as tolerated, plan of care communicated and reviewed with Audrey Oneil Jr. and family, all concerns addressed, will continue to monitor.     KIRBY NAZARIO RN   Progress Note  Critical Care    Admit Date: 9/14/2022   LOS: 31 days     Continuous Infusions:   sodium chloride 0.9% Stopped (09/28/22 1550)    sodium chloride 0.9% 5 mL/hr at 10/15/22 1051    DOBUTamine IV infusion (non-titrating) 3.75 mcg/kg/min (10/15/22 1800)    EPINEPHrine 0.04 mcg/kg/min (10/15/22 1800)    furosemide (LASIX) 10 mg/mL infusion (non-titrating) 20 mg/hr (10/15/22 1800)    heparin (porcine) in D5W 19 Units/kg/hr (10/15/22 1800)     Scheduled Meds:   acetaminophen  650 mg Oral QID    allopurinoL  200 mg Oral Daily    amiodarone  300 mg Oral Daily    aspirin  81 mg Oral Daily    atorvastatin  80 mg Oral QHS    DAPTOmycin (CUBICIN) IV (PEDS and ADULTS)  10 mg/kg Intravenous Q24H    famotidine  20 mg Oral Daily    LIDOcaine  1 patch Transdermal Q24H    LIDOcaine HCl 2%  15 mL Oral Once    olopatadine  1 drop Both Eyes Daily    polyethylene glycol  17 g Oral Daily    potassium chloride  40 mEq Oral Once    sucralfate  1 g Oral BID     PRN Meds:sodium chloride, acetaminophen, artificial tears, dextromethorphan-guaiFENesin  mg, heparin (PORCINE), heparin (PORCINE),  HYDROcodone-acetaminophen, melatonin, methocarbamoL, ondansetron, senna-docusate 8.6-50 mg, sodium chloride 0.9%    Review of patient's allergies indicates:   Allergen Reactions    Iodine containing multivitamin Swelling     itching    Keflex [cephalexin] Swelling     Eyes.  Tolerated multiple doses of zosyn and 1 dose of augmentin in 2015 and 2016, respectively    Peaches [peach (prunus persica)] Swelling     eyes    Shellfish containing products Swelling    Fig tree Swelling     itching    Tuberculin spenser test ppd Rash       OBJECTIVE:     Vital Signs (Most Recent)  Temp: 97.6 °F (36.4 °C) (10/15/22 1500)  Pulse: 98 (10/15/22 1801)  Resp: (!) 31 (10/15/22 1801)  BP: 108/79 (10/15/22 1801)  SpO2: 100 % (10/15/22 1801)    Vital Signs Range (Last 24H):  Temp:  [97.6 °F (36.4 °C)-98.8 °F (37.1 °C)]   Pulse:  []   Resp:  [18-47]   BP: (100-165)/(57-82)   SpO2:  [89 %-100 %]     I & O (Last 24H):  Intake/Output Summary (Last 24 hours) at 10/15/2022 1933  Last data filed at 10/15/2022 1800  Gross per 24 hour   Intake 2552.67 ml   Output 1510 ml   Net 1042.67 ml     Ventilator Data (Last 24H):     Oxygen Concentration (%):  [28] 28    Hemodynamic Parameters (Last 24H):         Lines/Drains:  Tunneled Central Line Insertion/Assessment - Triple Lumen  10/14/22 1800 right subclavian (Active)   Site Assessment No drainage;No redness;No swelling;No warmth 10/15/22 0701   Line Securement Device Secured with sutures 10/15/22 0701   Dressing Type Biopatch in place;Central line dressing 10/15/22 1501   Dressing Status Clean;Dry;Intact 10/15/22 1501   Dressing Intervention Integrity maintained 10/15/22 1501   Date on Dressing 10/14/22 10/15/22 0701   Dressing Due to be Changed 10/21/22 10/15/22 0701   Distal Patency/Care infusing 10/15/22 1501   Medial 1 Patency/Care infusing 10/15/22 1501   Waveform Normal 10/15/22 1501   Number of days: 1            Peripheral IV - Single Lumen 10/14/22 1743 24 G Right;Posterior Hand  (Active)   Site Assessment Clean;Dry;Intact;No redness;No swelling 10/15/22 1501   Extremity Assessment Distal to IV No abnormal discoloration;No redness;No swelling;No warmth 10/15/22 1501   Line Status Saline locked 10/15/22 1501   Dressing Status Clean;Dry;Intact 10/15/22 1501   Dressing Intervention Integrity maintained 10/15/22 1501   Dressing Change Due 10/18/22 10/15/22 1501   Site Change Due 10/18/22 10/15/22 0701   Reason Not Rotated Not due 10/15/22 0405   Number of days: 1            Midline Catheter Insertion/Assessment  - Single Lumen 10/14/22 1100 Right brachial vein 18g x 10cm (Active)   Site Assessment Clean;Dry;Intact;No redness;No swelling 10/15/22 1501   IV Device Securement catheter securement device 10/15/22 1501   Line Status Infusing 10/15/22 1501   Dressing Type Biopatch in place;Central line dressing 10/15/22 1501   Dressing Status Clean;Dry;Intact 10/15/22 1501   Dressing Intervention Integrity maintained 10/15/22 1501   Dressing Change Due 10/21/22 10/15/22 1501   Site Change Due 11/12/22 10/15/22 0005   Number of days: 1       Laboratory (Last 24H):  CBC:  Recent Labs   Lab 10/15/22  0329 10/15/22  0849   WBC 9.82  --    HGB 8.4*  --    HCT 26.4* 33*     --      CMP:  Recent Labs   Lab 10/15/22  1256   CALCIUM 8.7   *   K 4.1   CO2 26   CL 95   BUN 34*   CREATININE 2.0*     KIRBY NAZARIO RN  10/15/2022  7:33 PM

## 2022-10-16 NOTE — CARE UPDATE
HD overnight update    CVP 14  SVO2 50  CI/CO 3.1/6.5      plan  increase epi gtt to 0.06, keep  gtt 3.75, heparin gtt  Ivp lasix 80 and increase gtt to 40

## 2022-10-16 NOTE — PROGRESS NOTES
[facility] - Cardiac Intensive Care  Heart Transplant  Progress Note    Patient Name: Audrey Oneil Jr.  MRN: [id number]  Admission Date: 9/14/2022  Hospital Length of Stay: 31 days  Attending Physician: Migue Henry Jr.,*  Primary Care Provider: Primary Doctor No  Principal Problem:Acute on chronic combined systolic and diastolic heart failure    Subjective:     Interval History: Overnight pt stepped up to ICU given worsening UOP and given patient's CVP 11 and SVO2 47.Pt started on epi.     Hemodynamics Care Update Note    SVO2: 48  CVP: 11  CO: 4  CI: 2.8  SVR: 1167     Continuous Infusions:   sodium chloride 0.9% Stopped (09/28/22 1550)    sodium chloride 0.9% 5 mL/hr at 10/15/22 1051    DOBUTamine IV infusion (non-titrating) 3.75 mcg/kg/min (10/15/22 1800)    EPINEPHrine 0.04 mcg/kg/min (10/15/22 1800)    furosemide (LASIX) 10 mg/mL infusion (non-titrating) 20 mg/hr (10/15/22 1800)    heparin (porcine) in D5W 19 Units/kg/hr (10/15/22 1800)     Scheduled Meds:   acetaminophen  650 mg Oral QID    allopurinoL  200 mg Oral Daily    amiodarone  300 mg Oral Daily    aspirin  81 mg Oral Daily    atorvastatin  80 mg Oral QHS    DAPTOmycin (CUBICIN) IV (PEDS and ADULTS)  10 mg/kg Intravenous Q24H    famotidine  20 mg Oral Daily    LIDOcaine  1 patch Transdermal Q24H    LIDOcaine HCl 2%  15 mL Oral Once    olopatadine  1 drop Both Eyes Daily    polyethylene glycol  17 g Oral Daily    potassium chloride  40 mEq Oral Once    sucralfate  1 g Oral BID     PRN Meds:sodium chloride, acetaminophen, artificial tears, dextromethorphan-guaiFENesin  mg, heparin (PORCINE), heparin (PORCINE), HYDROcodone-acetaminophen, melatonin, methocarbamoL, ondansetron, senna-docusate 8.6-50 mg, sodium chloride 0.9%    Review of patient's allergies indicates:   Allergen Reactions    Iodine containing multivitamin Swelling     itching    Keflex [cephalexin] Swelling     Eyes.  Tolerated multiple doses of zosyn and 1  dose of augmentin in 2015 and 2016, respectively    Peaches [peach (prunus persica)] Swelling     eyes    Shellfish containing products Swelling    Fig tree Swelling     itching    Tuberculin spenser test ppd Rash     Objective:     Vital Signs (Most Recent):  Temp: 97.6 °F (36.4 °C) (10/15/22 1500)  Pulse: 98 (10/15/22 1801)  Resp: (!) 31 (10/15/22 1801)  BP: 108/79 (10/15/22 1801)  SpO2: 100 % (10/15/22 1801)   Vital Signs (24h Range):  Temp:  [97.6 °F (36.4 °C)-98.8 °F (37.1 °C)] 97.6 °F (36.4 °C)  Pulse:  [] 98  Resp:  [18-61] 31  SpO2:  [89 %-100 %] 100 %  BP: (100-165)/(57-82) 108/79     Patient Vitals for the past 72 hrs (Last 3 readings):   Weight   10/13/22 0753 90.8 kg (200 lb 2.8 oz)     Body mass index is 31.35 kg/m².      Intake/Output Summary (Last 24 hours) at 10/15/2022 1915  Last data filed at 10/15/2022 1800  Gross per 24 hour   Intake 2552.67 ml   Output 1510 ml   Net 1042.67 ml       Physical Exam  Constitutional:       General: He is not in acute distress.     Appearance: Normal appearance. He is normal weight. He is not ill-appearing or toxic-appearing.   HENT:      Head: Normocephalic and atraumatic.      Nose: Nose normal. No congestion.      Mouth/Throat:      Mouth: Mucous membranes are moist.      Pharynx: No oropharyngeal exudate.   Eyes:      General:         Right eye: No discharge.         Left eye: No discharge.      Extraocular Movements: Extraocular movements intact.      Pupils: Pupils are equal, round, and reactive to light.   Cardiovascular:      Rate and Rhythm: Normal rate and regular rhythm.      Heart sounds: No murmur heard.    No friction rub. No gallop.   Pulmonary:      Effort: Pulmonary effort is normal. No respiratory distress.      Breath sounds: Normal breath sounds. No wheezing, rhonchi or rales.   Abdominal:      General: Abdomen is flat. Bowel sounds are normal. There is no distension.      Palpations: Abdomen is soft.      Tenderness: There is no abdominal  tenderness.   Musculoskeletal:         General: No swelling. Normal range of motion.      Cervical back: Normal range of motion. No rigidity.      Right lower leg: Edema present.      Left lower leg: Edema present.   Skin:     General: Skin is warm and dry.      Findings: No lesion or rash.   Neurological:      General: No focal deficit present.      Mental Status: He is alert and oriented to person, place, and time.      Cranial Nerves: No cranial nerve deficit.      Motor: No weakness.       Significant Labs:  CBC:  Recent Labs   Lab 10/13/22  0415 10/14/22  0400 10/15/22  0329 10/15/22  0849   WBC 7.93 9.88 9.82  --    RBC 3.08* 3.12* 2.99*  --    HGB 8.4* 8.8* 8.4*  --    HCT 27.5* 27.9* 26.4* 33*    253 304  --    MCV 89 89 88  --    MCH 27.3 28.2 28.1  --    MCHC 30.5* 31.5* 31.8*  --      BNP:  No results for input(s): BNP in the last 168 hours.    Invalid input(s): BNPTRIAGELBLO  CMP:  Recent Labs   Lab 10/10/22  1334 10/11/22  0500 10/13/22  0925 10/14/22  0400 10/15/22  0329 10/15/22  1256   GLU  --    < >  --  105 146* 147*   CALCIUM  --    < >  --  9.0 8.6* 8.7   ALBUMIN 2.8*  --  2.8*  --   --   --    PROT 6.5  --  6.7  --   --   --    NA  --    < >  --  135* 134* 134*   K  --    < >  --  4.3 4.1 4.1   CO2  --    < >  --  25 26 26   CL  --    < >  --  98 95 95   BUN  --    < >  --  31* 33* 34*   CREATININE  --    < >  --  2.1* 2.1* 2.0*   ALKPHOS 86  --  86  --   --   --    ALT 24  --  19  --   --   --    AST 20  --  21  --   --   --    BILITOT 0.6  --  0.8  --   --   --     < > = values in this interval not displayed.      Coagulation:   Recent Labs   Lab 10/14/22  0400 10/15/22  0105 10/15/22  0329   APTT 28.1 41.6* 40.8*     LDH:  No results for input(s): LDH in the last 72 hours.  Microbiology:  Microbiology Results (last 7 days)       ** No results found for the last 168 hours. **            BMP:   Recent Labs   Lab 10/15/22  1256   *   *   K 4.1   CL 95   CO2 26   BUN 34*    CREATININE 2.0*   CALCIUM 8.7   MG 2.0     I have reviewed all pertinent labs within the past 24 hours.    Estimated Creatinine Clearance: 36.9 mL/min (A) (based on SCr of 2 mg/dL (H)).    Diagnostic Results:  Reviewed     Assessment and Plan:   73 yo WM being worked up for LVAD since hospitalization January 2022 for recurrent VT (he thinks had MI) and cardiogenic shock at [facility]. He was  later seen in [facility] where he was treated for cardiogenic shock and sent home on .  He remains on  and is pursuing evaluation for LVAD.     His case was discussed at selection committee and he was deferred pending evaluation of pancreatic mass.  They wish to proceed with MRI but not sure with his ICD if this will be possible.Presented with MRSA bacteremia on admission.   HARRY 9/21/22: Not concerning for Vegetations. BCx negative 9/18.      9/27 CRT generator and lead extraction, pocket cx => hypotensive post-procedure requiring Epi, ICU  9/28 off Epi, transfer to CSU  9/29 sudden hypoxemic respiratory failure requiring urgent intubation after sats dropped while laying flat for PICC line => tx ICU, panculture        * Chronic combined systolic and diastolic congestive heart failure  - On Dobutamine 3.75( increased from 2.5 last night). Also received Bumex   - cr uptrending. Plan was to get RHC yesterday in the setting of worsening cr , but pt was sob on lying flat and had to give bumex and increase his .   - Daily weights, Strict I/Os  - Monitor electrolytes and Maintain Mg >2 and K >4  -Telemetry monitoring  -S/p  tunneled PICC insertion   - Epi started 10/15 and increased to .04  - Lasix restarted today given decreased UOP. Lasix 80 IV given and gtt started at 20 mg/hr   - Daily weights, Strict I/Os      Acute Hypoxic respiratory failure 9/29/22 -resolved  -suspect aspiration event. Completed 5 day course with zosyn 10/4/22  -intubated and sedated 9/29, extubated 10/1/22  -Saturating well on room air.   -step down to  floor 10/6.      Pancreatic lesion  - Gastroenterology on board  - EUS with biopsy  10/4, cytology results -negative for malignant cells, c/w cyst contents.      Bacteremia due to methicillin resistant Staphylococcus aureus  - Monitor WBC count and fever curve, pan-culture if patient spikes  - ID consult and recommendations are appreciated  - On Dapto currently end date 11/11 per ID ( 6 wks after device removal 9/27) for the MRSA bacteremia 9/14, 9/15, 9/16 positive for MRSA. 9/18 NG  - completed 5day  Course of zosyn 10/4/22 for aspiration pneumonia.   -9/30 BCX negative , resp cultures 10/1 NG      H/O ventricular tachycardia  - Amiodarone 300 mg, daily  - Monitor LFTs.         Coronary artery disease involving native coronary artery of native heart without angina pectoris  - Asprin 81 mg, daily  - Atorvastatin 80 mg, nightl     Left Brachial Vein Thrombosis 9/21  - on Heparin . Will consider transitioning to eliquis on d/c     Acute on Chronic Gout  -2nd recurrence 10/8  while in the hospital.( 1st recurrence treated with 5 day course Pred completed 10/6).   -Completed  0.6 mg OD for 4 days 10/12/22.  feels better today.   -On allopurinol 200 mg         Curry Mendoza MD  Heart Transplant  [facility] - Cardiac Intensive [facility]

## 2022-10-16 NOTE — SUBJECTIVE & OBJECTIVE
Interval History: Pt remains in the ICU. Noted to have temp of 100.1. He reports generalized fatigue and SOB.     Hemodynamics     SVO2: 46  CVP: 11  CO: 5  CI: 2.72  SVR: 1231     Continuous Infusions:   sodium chloride 0.9% Stopped (09/28/22 1550)    sodium chloride 0.9% 5 mL/hr at 10/15/22 1051    DOBUTamine IV infusion (non-titrating) 3.75 mcg/kg/min (10/16/22 1600)    EPINEPHrine 0.06 mcg/kg/min (10/16/22 1600)    furosemide (LASIX) 10 mg/mL infusion (non-titrating) 40 mg/hr (10/16/22 1600)    heparin (porcine) in D5W 19 Units/kg/hr (10/16/22 1600)     Scheduled Meds:   acetaminophen  650 mg Oral QID    allopurinoL  200 mg Oral Daily    amiodarone  300 mg Oral Daily    aspirin  81 mg Oral Daily    atorvastatin  80 mg Oral QHS    azithromycin  500 mg Oral Daily    DAPTOmycin (CUBICIN) IV (PEDS and ADULTS)  10 mg/kg Intravenous Q24H    famotidine  20 mg Oral Daily    LIDOcaine  1 patch Transdermal Q24H    LIDOcaine HCl 2%  15 mL Oral Once    olopatadine  1 drop Both Eyes Daily    piperacillin-tazobactam (ZOSYN) IVPB  4.5 g Intravenous Q8H    polyethylene glycol  17 g Oral Daily    potassium chloride  40 mEq Oral Once    sucralfate  1 g Oral BID     PRN Meds:sodium chloride, acetaminophen, artificial tears, dextromethorphan-guaiFENesin  mg, heparin (PORCINE), heparin (PORCINE), HYDROcodone-acetaminophen, melatonin, methocarbamoL, ondansetron, senna-docusate 8.6-50 mg, sodium chloride 0.9%    Review of patient's allergies indicates:   Allergen Reactions    Iodine containing multivitamin Swelling     itching    Keflex [cephalexin] Swelling     Eyes.  Tolerated multiple doses of zosyn and 1 dose of augmentin in 2015 and 2016, respectively    Peaches [peach (prunus persica)] Swelling     eyes    Shellfish containing products Swelling    Fig tree Swelling     itching    Tuberculin spenser test ppd Rash     Objective:     Vital Signs (Most Recent):  Temp: 98.4 °F (36.9 °C) (10/16/22 1500)  Pulse: 94 (10/16/22  1528)  Resp: (!) 21 (10/16/22 1528)  BP: 93/68 (10/16/22 1500)  SpO2: 97 % (10/16/22 1528)   Vital Signs (24h Range):  Temp:  [97.9 °F (36.6 °C)-100.1 °F (37.8 °C)] 98.4 °F (36.9 °C)  Pulse:  [] 94  Resp:  [19-51] 21  SpO2:  [91 %-100 %] 97 %  BP: ()/(52-84) 93/68     No data found.  Body mass index is 31.35 kg/m².      Intake/Output Summary (Last 24 hours) at 10/16/2022 1638  Last data filed at 10/16/2022 1600  Gross per 24 hour   Intake 1283.12 ml   Output 3950 ml   Net -2666.88 ml         Physical Exam  Constitutional:       General: He is not in acute distress.     Appearance: Normal appearance. He is normal weight. He is not ill-appearing or toxic-appearing.   HENT:      Head: Normocephalic and atraumatic.      Nose: Nose normal. No congestion.      Mouth/Throat:      Mouth: Mucous membranes are moist.      Pharynx: No oropharyngeal exudate.   Eyes:      General:         Right eye: No discharge.         Left eye: No discharge.      Extraocular Movements: Extraocular movements intact.      Pupils: Pupils are equal, round, and reactive to light.   Cardiovascular:      Rate and Rhythm: Normal rate and regular rhythm.      Heart sounds: No murmur heard.    No friction rub. No gallop.   Pulmonary:      Effort: Pulmonary effort is normal. No respiratory distress.      Breath sounds: Normal breath sounds. No wheezing, rhonchi or rales.   Abdominal:      General: Abdomen is flat. Bowel sounds are normal. There is no distension.      Palpations: Abdomen is soft.      Tenderness: There is no abdominal tenderness.   Musculoskeletal:         General: No swelling. Normal range of motion.      Cervical back: Normal range of motion. No rigidity.      Right lower leg: Edema present.      Left lower leg: Edema present.   Skin:     General: Skin is warm and dry.      Findings: No lesion or rash.   Neurological:      General: No focal deficit present.      Mental Status: He is alert and oriented to person, place, and  time.      Cranial Nerves: No cranial nerve deficit.      Motor: No weakness.       Significant Labs:  CBC:  Recent Labs   Lab 10/14/22  0400 10/15/22  0329 10/15/22  0849 10/16/22  0400   WBC 9.88 9.82  --  14.66*   RBC 3.12* 2.99*  --  3.14*   HGB 8.8* 8.4*  --  8.8*   HCT 27.9* 26.4* 33* 27.3*    304  --  410   MCV 89 88  --  87   MCH 28.2 28.1  --  28.0   MCHC 31.5* 31.8*  --  32.2     BNP:  No results for input(s): BNP in the last 168 hours.    Invalid input(s): BNPTRIAGELBLO  CMP:  Recent Labs   Lab 10/10/22  1334 10/11/22  0500 10/13/22  0925 10/14/22  0400 10/15/22  0329 10/15/22  1256 10/16/22  0400   GLU  --    < >  --    < > 146* 147* 157*   CALCIUM  --    < >  --    < > 8.6* 8.7 8.7   ALBUMIN 2.8*  --  2.8*  --   --   --   --    PROT 6.5  --  6.7  --   --   --   --    NA  --    < >  --    < > 134* 134* 132*   K  --    < >  --    < > 4.1 4.1 4.0   CO2  --    < >  --    < > 26 26 29   CL  --    < >  --    < > 95 95 89*   BUN  --    < >  --    < > 33* 34* 31*   CREATININE  --    < >  --    < > 2.1* 2.0* 1.9*   ALKPHOS 86  --  86  --   --   --   --    ALT 24  --  19  --   --   --   --    AST 20  --  21  --   --   --   --    BILITOT 0.6  --  0.8  --   --   --   --     < > = values in this interval not displayed.      Coagulation:   Recent Labs   Lab 10/15/22  0105 10/15/22  0329 10/16/22  0400   APTT 41.6* 40.8* 40.2*     LDH:  No results for input(s): LDH in the last 72 hours.  Microbiology:  Microbiology Results (last 7 days)       Procedure Component Value Units Date/Time    Respiratory Infection Panel (PCR), Nasopharyngeal [[id number]] Collected: 10/16/22 1254    Order Status: Completed Specimen: Nasopharyngeal Swab Updated: 10/16/22 1554     Respiratory Infection Panel Source NP Swab     Adenovirus Not Detected     Coronavirus 229E, Common Cold Virus Not Detected     Coronavirus HKU1, Common Cold Virus Not Detected     Coronavirus NL63, Common Cold Virus Not Detected     Coronavirus OC43, Common  Cold Virus Not Detected     Comment: The Coronavirus strains detected in this test cause the common cold.  These strains are not the COVID-19 (novel Coronavirus)strain   associated with the respiratory disease outbreak.          SARS-CoV2 (COVID-19) Qualitative PCR Not Detected     Human Metapneumovirus Not Detected     Human Rhinovirus/Enterovirus Not Detected     Influenza A (subtypes H1, H1-2009,H3) Not Detected     Influenza B Not Detected     Parainfluenza Virus 1 Not Detected     Parainfluenza Virus 2 Not Detected     Parainfluenza Virus 3 Not Detected     Parainfluenza Virus 4 Not Detected     Respiratory Syncytial Virus Not Detected     Bordetella Parapertussis (EQ5047) Not Detected     Bordetella pertussis (ptxP) Not Detected     Chlamydia pneumoniae Not Detected     Mycoplasma pneumoniae Not Detected    Narrative:      For all other respiratory sources, order [id number] -  Respiratory Viral Panel by PCR    Blood culture [[id number]] Collected: 10/16/22 1530    Order Status: Sent Specimen: Blood from Peripheral, Antecubital, Right Updated: 10/16/22 1536    Blood culture [[id number]] Collected: 10/16/22 1531    Order Status: Sent Specimen: Blood from Peripheral, Hand, Right Updated: 10/16/22 1536    Influenza A & B by Molecular [[id number]] Collected: 10/16/22 1300    Order Status: Completed Specimen: Nasopharyngeal Swab Updated: 10/16/22 1441     Influenza A, Molecular Negative     Influenza B, Molecular Negative     Flu A & B Source Nasal swab    Culture, Respiratory with Gram Stain [[id number]]     Order Status: No result Specimen: Respiratory             BMP:   Recent Labs   Lab 10/16/22  0400   *   *   K 4.0   CL 89*   CO2 29   BUN 31*   CREATININE 1.9*   CALCIUM 8.7   MG 1.8     I have reviewed all pertinent labs within the past 24 hours.    Estimated Creatinine Clearance: 38.9 mL/min (A) (based on SCr of 1.9 mg/dL (H)).    Diagnostic Results:  Reviewed

## 2022-10-17 NOTE — CARE UPDATE
Hemodynamics   :   SVO2: 56  CVP: 8  CO: 6.2  CI: 3.0  SVR: 900      Intake/Output Summary (Last 24 hours) at 10/17/2022 0255  Last data filed at 10/17/2022 0000  Gross per 24 hour   Intake 2029.55 ml   Output 4125 ml   Net -2095.45 ml       Plan: No changes.     Case discussed with on call attending.    Smith Perez MD  Cardiology Fellow, PGY4

## 2022-10-17 NOTE — PROGRESS NOTES
[facility] - Cardiac Intensive Care  Heart Transplant  Progress Note    Patient Name: Audrey Oneil Jr.  MRN: [id number]  Admission Date: 9/14/2022  Hospital Length of Stay: 33 days  Attending Physician: Migue Henry Jr.,*  Primary Care Provider: Primary Doctor No  Principal Problem:Acute on chronic combined systolic and diastolic heart failure    Subjective:     Interval History: Pt remains in the ICU. This AM had R sided C pain and R arm pain that resolved spontaneously. Is afebrile today after spiking low grade temp yeterday. Still reporting generalized fatigue and SOB. Poor appetite and PO intake. Will continue to give boost shakes and encourage physical activity. Palliative care consult today which was not received well by patient and family.     Hemodynamics     SVO2: 49%  CVP: 6  CO: 6.0  CI: 2.9  SVR: 1280    Continue current support.     Continuous Infusions:   sodium chloride 0.9% Stopped (09/28/22 1550)    sodium chloride 0.9% 5 mL/hr at 10/15/22 1051    DOBUTamine IV infusion (non-titrating) 3.75 mcg/kg/min (10/17/22 1500)    EPINEPHrine 0.06 mcg/kg/min (10/17/22 1500)    furosemide (LASIX) 10 mg/mL infusion (non-titrating) 10 mg/hr (10/17/22 1500)    heparin (porcine) in D5W 19 Units/kg/hr (10/17/22 1500)     Scheduled Meds:   acetaminophen  650 mg Oral QID    allopurinoL  200 mg Oral Daily    amiodarone  300 mg Oral Daily    aspirin  81 mg Oral Daily    atorvastatin  80 mg Oral QHS    azithromycin  500 mg Oral Daily    DAPTOmycin (CUBICIN) IV (PEDS and ADULTS)  10 mg/kg Intravenous Q24H    famotidine  20 mg Oral Daily    LIDOcaine  1 patch Transdermal Q24H    LIDOcaine HCl 2%  15 mL Oral Once    olopatadine  1 drop Both Eyes Daily    piperacillin-tazobactam (ZOSYN) IVPB  4.5 g Intravenous Q8H    polyethylene glycol  17 g Oral Daily    potassium chloride  40 mEq Oral Once    sucralfate  1 g Oral BID     PRN Meds:sodium chloride, acetaminophen, artificial tears,  dextromethorphan-guaiFENesin  mg, heparin (PORCINE), heparin (PORCINE), HYDROcodone-acetaminophen, melatonin, methocarbamoL, ondansetron, senna-docusate 8.6-50 mg, sodium chloride 0.9%    Review of patient's allergies indicates:   Allergen Reactions    Iodine containing multivitamin Swelling     itching    Keflex [cephalexin] Swelling     Eyes.  Tolerated multiple doses of zosyn and 1 dose of augmentin in 2015 and 2016, respectively    Peaches [peach (prunus persica)] Swelling     eyes    Shellfish containing products Swelling    Fig tree Swelling     itching    Tuberculin spenser test ppd Rash     Objective:     Vital Signs (Most Recent):  Temp: 99.1 °F (37.3 °C) (10/17/22 1100)  Pulse: 87 (10/17/22 1552)  Resp: (!) 28 (10/17/22 1552)  BP: 112/61 (10/17/22 1500)  SpO2: (!) 94 % (10/17/22 1552)   Vital Signs (24h Range):  Temp:  [98.4 °F (36.9 °C)-99.8 °F (37.7 °C)] 99.1 °F (37.3 °C)  Pulse:  [] 87  Resp:  [18-50] 28  SpO2:  [87 %-99 %] 94 %  BP: ()/(54-81) 112/61     No data found.  Body mass index is 31.35 kg/m².      Intake/Output Summary (Last 24 hours) at 10/17/2022 1629  Last data filed at 10/17/2022 1500  Gross per 24 hour   Intake 2226.53 ml   Output 3260 ml   Net -1033.47 ml           Physical Exam  Constitutional:       General: He is not in acute distress.     Appearance: Normal appearance. He is normal weight. He is not ill-appearing or toxic-appearing.   HENT:      Head: Normocephalic and atraumatic.      Nose: Nose normal. No congestion.      Mouth/Throat:      Mouth: Mucous membranes are moist.      Pharynx: No oropharyngeal exudate.   Eyes:      General:         Right eye: No discharge.         Left eye: No discharge.      Extraocular Movements: Extraocular movements intact.      Pupils: Pupils are equal, round, and reactive to light.   Cardiovascular:      Rate and Rhythm: Normal rate and regular rhythm.      Heart sounds: No murmur heard.    No friction rub. No gallop.    Pulmonary:      Effort: Pulmonary effort is normal. No respiratory distress.      Breath sounds: Normal breath sounds. No wheezing, rhonchi or rales.   Abdominal:      General: Abdomen is flat. Bowel sounds are normal. There is no distension.      Palpations: Abdomen is soft.      Tenderness: There is no abdominal tenderness.   Musculoskeletal:         General: No swelling. Normal range of motion.      Cervical back: Normal range of motion. No rigidity.      Right lower leg: Edema present.      Left lower leg: Edema present.   Skin:     General: Skin is warm and dry.      Findings: No lesion or rash.   Neurological:      General: No focal deficit present.      Mental Status: He is alert and oriented to person, place, and time.      Cranial Nerves: No cranial nerve deficit.      Motor: No weakness.       Significant Labs:  CBC:  Recent Labs   Lab 10/15/22  0329 10/15/22  0849 10/16/22  0400 10/17/22  0418   WBC 9.82  --  14.66* 14.62*   RBC 2.99*  --  3.14* 3.39*   HGB 8.4*  --  8.8* 9.3*   HCT 26.4* 33* 27.3* 29.0*     --  410 407   MCV 88  --  87 86   MCH 28.1  --  28.0 27.4   MCHC 31.8*  --  32.2 32.1       BNP:  No results for input(s): BNP in the last 168 hours.    Invalid input(s): BNPTRIAGELBLO  CMP:  Recent Labs   Lab 10/13/22  0925 10/14/22  0400 10/15/22  1256 10/16/22  0400 10/17/22  0418   GLU  --    < > 147* 157* 168*   CALCIUM  --    < > 8.7 8.7 8.4*   ALBUMIN 2.8*  --   --   --   --    PROT 6.7  --   --   --   --    NA  --    < > 134* 132* 131*   K  --    < > 4.1 4.0 3.1*   CO2  --    < > 26 29 33*   CL  --    < > 95 89* 85*   BUN  --    < > 34* 31* 30*   CREATININE  --    < > 2.0* 1.9* 2.0*   ALKPHOS 86  --   --   --   --    ALT 19  --   --   --   --    AST 21  --   --   --   --    BILITOT 0.8  --   --   --   --     < > = values in this interval not displayed.        Coagulation:   Recent Labs   Lab 10/15/22  0329 10/16/22  0400 10/17/22  0418   APTT 40.8* 40.2* 53.6*       LDH:  No results  for input(s): LDH in the last 72 hours.  Microbiology:  Microbiology Results (last 7 days)       Procedure Component Value Units Date/Time    Blood culture [[id number]] Collected: 10/16/22 1530    Order Status: Completed Specimen: Blood from Peripheral, Antecubital, Right Updated: 10/17/22 0115     Blood Culture, Routine No Growth to date    Blood culture [[id number]] Collected: 10/16/22 1531    Order Status: Completed Specimen: Blood from Peripheral, Hand, Right Updated: 10/17/22 0115     Blood Culture, Routine No Growth to date    Respiratory Infection Panel (PCR), Nasopharyngeal [[id number]] Collected: 10/16/22 1254    Order Status: Completed Specimen: Nasopharyngeal Swab Updated: 10/16/22 1555     Respiratory Infection Panel Source NP Swab     Adenovirus Not Detected     Coronavirus 229E, Common Cold Virus Not Detected     Coronavirus HKU1, Common Cold Virus Not Detected     Coronavirus NL63, Common Cold Virus Not Detected     Coronavirus OC43, Common Cold Virus Not Detected     Comment: The Coronavirus strains detected in this test cause the common cold.  These strains are not the COVID-19 (novel Coronavirus)strain   associated with the respiratory disease outbreak.          SARS-CoV2 (COVID-19) Qualitative PCR Not Detected     Human Metapneumovirus Not Detected     Human Rhinovirus/Enterovirus Not Detected     Influenza A (subtypes H1, H1-2009,H3) Not Detected     Influenza B Not Detected     Parainfluenza Virus 1 Not Detected     Parainfluenza Virus 2 Not Detected     Parainfluenza Virus 3 Not Detected     Parainfluenza Virus 4 Not Detected     Respiratory Syncytial Virus Not Detected     Bordetella Parapertussis (DX2025) Not Detected     Bordetella pertussis (ptxP) Not Detected     Chlamydia pneumoniae Not Detected     Mycoplasma pneumoniae Not Detected    Narrative:      For all other respiratory sources, order [id number] -  Respiratory Viral Panel by PCR    Influenza A & B by Molecular [[id number]] Collected:  "10/16/22 1300    Order Status: Completed Specimen: Nasopharyngeal Swab Updated: 10/16/22 1441     Influenza A, Molecular Negative     Influenza B, Molecular Negative     Flu A & B Source Nasal swab    Culture, Respiratory with Gram Stain [[id number]]     Order Status: No result Specimen: Respiratory             BMP:   Recent Labs   Lab 10/17/22  0418   *   *   K 3.1*   CL 85*   CO2 33*   BUN 30*   CREATININE 2.0*   CALCIUM 8.4*   MG 1.7       I have reviewed all pertinent labs within the past 24 hours.    Estimated Creatinine Clearance: 36.9 mL/min (A) (based on SCr of 2 mg/dL (H)).    Diagnostic Results:  Reviewed     Assessment and Plan:     71 yo WM being worked up for LVAD since hospitalization January 2022 for recurrent VT (he thinks had MI) and cardiogenic shock at [facility]. He was  later seen in [facility] where he was treated for cardiogenic shock and sent home on .  He remains on  and is pursuing evaluation for LVAD.     He has had a couple of observation admissions where he was told to be dry at visit June 2022. At July 2022 visit he was noted that he could not tolerate higher doses of meds due to symptomatic hypotension.  He still notes occasional dizzy spells when he 1st stands but there are not too bad at this time.  Uses Lasix only as needed targeting his home weight 218-220.      His case was discussed at selection committee and he was deferred pending evaluation of pancreatic mass.  They wish to proceed with MRI but not sure with his ICD if this will be possible. Home weight this AM was 216#.    Interval History:  Today he comes in due to abdominal discomfort. He ate packaged spaghetti last night and has since been with abdominal pain and dry heaving. No vomiting or diarrhea. He intermittently uses home oxygen 4L and felt the need to start using last night. He states he feels like " a bug got him". He reports noticing that his HR was significantly elevated over night about 100-115bpm. He is " compliant with his lasix. No lower extremity edema. Felt warm this morning. His abdomen appears distended however he reports it normally gets firm/tight when volume overloaded, which is not the case at this time.     Bedside echo shows EF approx 25-30%. IVC measuring approx 1.4cm and collapsible. No JVD. CXR shows bilateral pulmonary congestion.    Echo 9/14/22   Moderate left atrial enlargement.   The left ventricle is severely enlarged with eccentric hypertrophy and severely decreased systolic function.   There is severe left ventricular global hypokinesis with anterospetal and apical scar. The estimated ejection fraction is 10-15%.   Left ventricular diastolic dysfunction.   Normal right ventricular size with normal right ventricular systolic function.   Mild mitral regurgitation.   Normal central venous pressure (3 mmHg).   There is no significant tricuspid regurgitation and therefore the pulmonary artery systolic pressure cannot be reported.         * Acute on chronic combined systolic and diastolic heart failure  - Dobutamine 3.75 mcg/kg/min, Epi .06  - Holding: Spironolactone 25 mg, daily, Jardiance and was previously on Entresto 49-51 mg, BID  - Lasix drip turned down from 40 mg/hr to 10 mg/hr. CVP single digits.   - Daily weights, Strict I/Os  - Monitor electrolytes and Maintain Mg >2 and K >4  -Telemetry monitoring    Acute thrombosis of left brachial vein  Started on Heparin. IV over left arm replaced with rt arm iv. Will plan to get a PICC over the rt arm and monitor CVP and Mixed venous oxygen.     Pancreatic lesion  - Gastroenterology consult and recommendations appreciated  - Unable to obtain MRI for further evaluation given CRT-D  - Will hold off on obtaining CT with pancreatic protocol at this time in view of active infection requiring vancomycin and risk of contrast-induced nephrotoxicity in this patient    Bacteremia due to methicillin resistant Staphylococcus aureus  - Monitor WBC count  and fever curve, pan-culture if patient spikes  - ID consult and recommendations are appreciated  - Vancomycin 1,250, daily; Monitor level and renal panel  -patient has persistent positive blood cultures.  Cultures from 18 no growth so far.  His currently on vancomycin.  Id on board.  - consult EP for ICD removal.    H/O ventricular tachycardia  - Amiodarone 300 mg, daily  - Monitor LFTs    Coronary artery disease involving native coronary artery of native heart without angina pectoris  - Asprin 81 mg, daily  - Atorvastatin 80 mg, nightly      Uninterrupted Critical Care/Counseling Time (not including procedures): 45 min.       Venkatesh Love PA-C  Heart Transplant  [facility] - Cardiac Intensive [facility]

## 2022-10-17 NOTE — SUBJECTIVE & OBJECTIVE
Interval History: Pt remains in the ICU. This AM had R sided C pain and R arm pain that resolved spontaneously. Is afebrile today after spiking low grade temp yeterday. Still reporting generalized fatigue and SOB. Poor appetite and PO intake. Will continue to give boost shakes and encourage physical activity. Palliative care consult today which was not received well by patient and family.     Hemodynamics     SVO2: 49%  CVP: 6  CO: 6.0  CI: 2.9  SVR: 1280    Continue current support.     Continuous Infusions:   sodium chloride 0.9% Stopped (09/28/22 1550)    sodium chloride 0.9% 5 mL/hr at 10/15/22 1051    DOBUTamine IV infusion (non-titrating) 3.75 mcg/kg/min (10/17/22 1500)    EPINEPHrine 0.06 mcg/kg/min (10/17/22 1500)    furosemide (LASIX) 10 mg/mL infusion (non-titrating) 10 mg/hr (10/17/22 1500)    heparin (porcine) in D5W 19 Units/kg/hr (10/17/22 1500)     Scheduled Meds:   acetaminophen  650 mg Oral QID    allopurinoL  200 mg Oral Daily    amiodarone  300 mg Oral Daily    aspirin  81 mg Oral Daily    atorvastatin  80 mg Oral QHS    azithromycin  500 mg Oral Daily    DAPTOmycin (CUBICIN) IV (PEDS and ADULTS)  10 mg/kg Intravenous Q24H    famotidine  20 mg Oral Daily    LIDOcaine  1 patch Transdermal Q24H    LIDOcaine HCl 2%  15 mL Oral Once    olopatadine  1 drop Both Eyes Daily    piperacillin-tazobactam (ZOSYN) IVPB  4.5 g Intravenous Q8H    polyethylene glycol  17 g Oral Daily    potassium chloride  40 mEq Oral Once    sucralfate  1 g Oral BID     PRN Meds:sodium chloride, acetaminophen, artificial tears, dextromethorphan-guaiFENesin  mg, heparin (PORCINE), heparin (PORCINE), HYDROcodone-acetaminophen, melatonin, methocarbamoL, ondansetron, senna-docusate 8.6-50 mg, sodium chloride 0.9%    Review of patient's allergies indicates:   Allergen Reactions    Iodine containing multivitamin Swelling     itching    Keflex [cephalexin] Swelling     Eyes.  Tolerated multiple doses of zosyn and 1 dose of  augmentin in 2015 and 2016, respectively    Peaches [peach (prunus persica)] Swelling     eyes    Shellfish containing products Swelling    Fig tree Swelling     itching    Tuberculin spenser test ppd Rash     Objective:     Vital Signs (Most Recent):  Temp: 99.1 °F (37.3 °C) (10/17/22 1100)  Pulse: 87 (10/17/22 1552)  Resp: (!) 28 (10/17/22 1552)  BP: 112/61 (10/17/22 1500)  SpO2: (!) 94 % (10/17/22 1552)   Vital Signs (24h Range):  Temp:  [98.4 °F (36.9 °C)-99.8 °F (37.7 °C)] 99.1 °F (37.3 °C)  Pulse:  [] 87  Resp:  [18-50] 28  SpO2:  [87 %-99 %] 94 %  BP: ()/(54-81) 112/61     No data found.  Body mass index is 31.35 kg/m².      Intake/Output Summary (Last 24 hours) at 10/17/2022 1629  Last data filed at 10/17/2022 1500  Gross per 24 hour   Intake 2226.53 ml   Output 3260 ml   Net -1033.47 ml           Physical Exam  Constitutional:       General: He is not in acute distress.     Appearance: Normal appearance. He is normal weight. He is not ill-appearing or toxic-appearing.   HENT:      Head: Normocephalic and atraumatic.      Nose: Nose normal. No congestion.      Mouth/Throat:      Mouth: Mucous membranes are moist.      Pharynx: No oropharyngeal exudate.   Eyes:      General:         Right eye: No discharge.         Left eye: No discharge.      Extraocular Movements: Extraocular movements intact.      Pupils: Pupils are equal, round, and reactive to light.   Cardiovascular:      Rate and Rhythm: Normal rate and regular rhythm.      Heart sounds: No murmur heard.    No friction rub. No gallop.   Pulmonary:      Effort: Pulmonary effort is normal. No respiratory distress.      Breath sounds: Normal breath sounds. No wheezing, rhonchi or rales.   Abdominal:      General: Abdomen is flat. Bowel sounds are normal. There is no distension.      Palpations: Abdomen is soft.      Tenderness: There is no abdominal tenderness.   Musculoskeletal:         General: No swelling. Normal range of motion.      Cervical  back: Normal range of motion. No rigidity.      Right lower leg: Edema present.      Left lower leg: Edema present.   Skin:     General: Skin is warm and dry.      Findings: No lesion or rash.   Neurological:      General: No focal deficit present.      Mental Status: He is alert and oriented to person, place, and time.      Cranial Nerves: No cranial nerve deficit.      Motor: No weakness.       Significant Labs:  CBC:  Recent Labs   Lab 10/15/22  0329 10/15/22  0849 10/16/22  0400 10/17/22  0418   WBC 9.82  --  14.66* 14.62*   RBC 2.99*  --  3.14* 3.39*   HGB 8.4*  --  8.8* 9.3*   HCT 26.4* 33* 27.3* 29.0*     --  410 407   MCV 88  --  87 86   MCH 28.1  --  28.0 27.4   MCHC 31.8*  --  32.2 32.1       BNP:  No results for input(s): BNP in the last 168 hours.    Invalid input(s): BNPTRIAGELBLO  CMP:  Recent Labs   Lab 10/13/22  0925 10/14/22  0400 10/15/22  1256 10/16/22  0400 10/17/22  0418   GLU  --    < > 147* 157* 168*   CALCIUM  --    < > 8.7 8.7 8.4*   ALBUMIN 2.8*  --   --   --   --    PROT 6.7  --   --   --   --    NA  --    < > 134* 132* 131*   K  --    < > 4.1 4.0 3.1*   CO2  --    < > 26 29 33*   CL  --    < > 95 89* 85*   BUN  --    < > 34* 31* 30*   CREATININE  --    < > 2.0* 1.9* 2.0*   ALKPHOS 86  --   --   --   --    ALT 19  --   --   --   --    AST 21  --   --   --   --    BILITOT 0.8  --   --   --   --     < > = values in this interval not displayed.        Coagulation:   Recent Labs   Lab 10/15/22  0329 10/16/22  0400 10/17/22  0418   APTT 40.8* 40.2* 53.6*       LDH:  No results for input(s): LDH in the last 72 hours.  Microbiology:  Microbiology Results (last 7 days)       Procedure Component Value Units Date/Time    Blood culture [[id number]] Collected: 10/16/22 1530    Order Status: Completed Specimen: Blood from Peripheral, Antecubital, Right Updated: 10/17/22 0115     Blood Culture, Routine No Growth to date    Blood culture [[id number]] Collected: 10/16/22 1531    Order Status:  Completed Specimen: Blood from Peripheral, Hand, Right Updated: 10/17/22 0115     Blood Culture, Routine No Growth to date    Respiratory Infection Panel (PCR), Nasopharyngeal [[id number]] Collected: 10/16/22 1254    Order Status: Completed Specimen: Nasopharyngeal Swab Updated: 10/16/22 1555     Respiratory Infection Panel Source NP Swab     Adenovirus Not Detected     Coronavirus 229E, Common Cold Virus Not Detected     Coronavirus HKU1, Common Cold Virus Not Detected     Coronavirus NL63, Common Cold Virus Not Detected     Coronavirus OC43, Common Cold Virus Not Detected     Comment: The Coronavirus strains detected in this test cause the common cold.  These strains are not the COVID-19 (novel Coronavirus)strain   associated with the respiratory disease outbreak.          SARS-CoV2 (COVID-19) Qualitative PCR Not Detected     Human Metapneumovirus Not Detected     Human Rhinovirus/Enterovirus Not Detected     Influenza A (subtypes H1, H1-2009,H3) Not Detected     Influenza B Not Detected     Parainfluenza Virus 1 Not Detected     Parainfluenza Virus 2 Not Detected     Parainfluenza Virus 3 Not Detected     Parainfluenza Virus 4 Not Detected     Respiratory Syncytial Virus Not Detected     Bordetella Parapertussis (IX5505) Not Detected     Bordetella pertussis (ptxP) Not Detected     Chlamydia pneumoniae Not Detected     Mycoplasma pneumoniae Not Detected    Narrative:      For all other respiratory sources, order [id number] -  Respiratory Viral Panel by PCR    Influenza A & B by Molecular [[id number]] Collected: 10/16/22 1300    Order Status: Completed Specimen: Nasopharyngeal Swab Updated: 10/16/22 1441     Influenza A, Molecular Negative     Influenza B, Molecular Negative     Flu A & B Source Nasal swab    Culture, Respiratory with Gram Stain [[id number]]     Order Status: No result Specimen: Respiratory             BMP:   Recent Labs   Lab 10/17/22  0418   *   *   K 3.1*   CL 85*   CO2 33*   BUN 30*    CREATININE 2.0*   CALCIUM 8.4*   MG 1.7       I have reviewed all pertinent labs within the past 24 hours.    Estimated Creatinine Clearance: 36.9 mL/min (A) (based on SCr of 2 mg/dL (H)).    Diagnostic Results:  Reviewed

## 2022-10-17 NOTE — ASSESSMENT & PLAN NOTE
- Dobutamine 3.75 mcg/kg/min, Epi .06  - Holding: Spironolactone 25 mg, daily, Jardiance and was previously on Entresto 49-51 mg, BID  - Lasix drip turned down from 40 mg/hr to 10 mg/hr. CVP single digits.   - Daily weights, Strict I/Os  - Monitor electrolytes and Maintain Mg >2 and K >4  -Telemetry monitoring

## 2022-10-17 NOTE — PROGRESS NOTES
[facility] - Cardiac Intensive Care  Adult Nutrition  Progress Note    SUMMARY       Recommendations  1. Rec'd modifying diet to bland diet- per pt request   2. Continue Boost GC (strawberry flavor preferred) for optimization of protein and calorie intake  3. RD following    Goals: Meet % of EEN/EPN by RD f/u date  Nutrition Goal Status: progressing towards goal  Communication of RD Recs: other (POC)    Assessment and Plan    Nutrition Problem:  Severe Protein-Calorie Malnutrition  Malnutrition in the context of Acute Illness/Injury     Related to (etiology):  Decreased ability to consume sufficient intake      Signs and Symptoms (as evidenced by):  Energy Intake: <50% of estimated energy requirement for > 1 week  Body Fat Depletion: moderate depletion of orbitals and triceps   Muscle Mass Depletion: moderate depletion of clavicle region, scapular region, and lower extremities   Weight Loss: 7% x 2 months        Interventions(treatment strategy):  Collaboration of nutrition care w/ other providers     Nutrition Diagnosis Status:  Continues        Reason for Assessment    Reason For Assessment: RD follow-up  Diagnosis: cardiac disease  Relevant Medical History: HTN, CHF, gout  Interdisciplinary Rounds: did not attend  General Information Comments: Spoke w/ pt and pt's caregiver at bedside, reports a decreased appetite at this time- consuming bites of food sometimes- no percentage noted. Pt requests foods that are bland and softer in consistency. RD gave recommendations for bland diet for pt to tolerate- pt agreeable. Pt reports drinking Boost off and on (strawberry flavor preferred). Pt meets criteria for severe protein malnutrition in the context of acute illness per ASPEN guidelines.  Nutrition Discharge Planning: cardiac diet    Nutrition Risk Screen    Nutrition Risk Screen: no indicators present    Nutrition/Diet History    Spiritual, Cultural Beliefs, Hindu Practices, Values that Affect Care:  "no    Anthropometrics    Temp: 99.1 °F (37.3 °C)  Height Method: Stated  Height: 5' 7" (170.2 cm)  Height (inches): 67 in  Weight Method: Standard Scale  Weight: 90.8 kg (200 lb 2.8 oz)  Weight (lb): 200.18 lb  Ideal Body Weight (IBW), Male: 148 lb  % Ideal Body Weight, Male (lb): 140.54 %  BMI (Calculated): 31.3       Lab/Procedures/Meds    Pertinent Labs Reviewed: reviewed  Pertinent Labs Comments: BUN 28, Cr 1.7, GFR 42.8, Glucose 122  Pertinent Medications Reviewed: reviewed  Pertinent Medications Comments: atorvastatin      Estimated/Assessed Needs    Weight Used For Calorie Calculations: 88.5 kg (195 lb 1.7 oz)  Energy Calorie Requirements (kcal): 1923  Energy Need Method: Ono-St Jeor (PAL 1.2)  Protein Requirements: 133 g (1.5 g/kg)  Weight Used For Protein Calculations: 88.5 kg (195 lb 1.7 oz)        RDA Method (mL): 1923              Evaluation of Received Nutrient/Fluid Intake    I/O: -584 ml since admit  Energy Calories Required: not meeting needs  Protein Required: not meeting needs  Fluid Required: not meeting needs  Total Fluid Intake (mL/kg): 1 ml or fluid per MD  Tolerance: tolerating  % Intake of Estimated Energy Needs: 0 - 25 %  % Meal Intake: 0 - 25 %    Nutrition Risk    Level of Risk/Frequency of Follow-up: low ((1 x/week))     Monitor and Evaluation    Food and Nutrient Intake: food and beverage intake, energy intake  Food and Nutrient Adminstration: diet order  Knowledge/Beliefs/Attitudes: food and nutrition knowledge/skill, beliefs and attitudes  Physical Activity and Function: nutrition-related ADLs and IADLs, factors affecting access to physical activity  Anthropometric Measurements: height/length, weight, weight change, body mass index, growth pattern indices/percentile ranks  Biochemical Data, Medical Tests and Procedures: electrolyte and renal panel, gastrointestinal profile, glucose/endocrine profile, inflammatory profile, lipid profile  Nutrition-Focused Physical Findings: overall " appearance, extremities, muscles and bones, head and eyes, skin     Nutrition Follow-Up    RD Follow-up?: Yes

## 2022-10-17 NOTE — PLAN OF CARE
Cardiac ICU Care Plan    POC reviewed with Audrey Oneil Jr. and family. CVP 6/5 today. Lasix gtt decreased to 10mg/hr, then increased back to 20mg/hr per HTS. Seay with good UOP. Patient still remains on epi, heparin and dobutamine. Questions and concerns addressed. No acute events today. Pt progressing toward goals. Ongoing goals of care discussions with team and patient.Will continue to monitor. See below and flowsheets for full assessment and VS info.       Neuro:  Lai Coma Scale  Best Eye Response: 4-->(E4) spontaneous  Best Motor Response: 6-->(M6) obeys commands  Best Verbal Response: 5-->(V5) oriented  Lai Coma Scale Score: 15  Assessment Qualifiers: patient not sedated/intubated  Pupil PERRLA: yes    24 hr Temp:  [98.4 °F (36.9 °C)-99.8 °F (37.7 °C)]      CV:  Rhythm: normal sinus rhythm   DVT prophylaxis: VTE Required Core Measure: Pharmacological prophylaxis initiated/maintained    CVP (mean): 334 mmHg (10/17/22 1700)    [REMOVED] PICC Double Lumen 09/22/22 1507 right basilic-Current Insertion Depth (cm): 36 cm (09/22/22 1507)  SVO2 (%): 49 % (10/17/22 0400)               Pulses  Right Radial Pulse: 2+ (normal)  Left Radial Pulse: 2+ (normal)  Right Dorsalis Pedis Pulse: 1+ (weak)  Left Dorsalis Pedis Pulse: 1+ (weak)  Right Posterior Tibial Pulse: 1+ (weak)  Left Posterior Tibial Pulse: 1+ (weak)    Resp:  O2 Device (Oxygen Therapy): nasal cannula  Flow (L/min): 4  Vent Mode: Spont  Set Rate: 20 BPM  Oxygen Concentration (%): 28  Vt Set: 500 mL  PEEP/CPAP: 5 cmH20  Pressure Support: 4 cmH20    GI/:  GI prophylaxis: yes  Diet/Nutrition Received: low saturated fat/low cholesterol, 2 gram sodium  Last Bowel Movement: 10/17/22  Voiding Characteristics: voids spontaneously without difficulty       Urethral Catheter 10/17/22 0430 Double-lumen 16 Fr.-Reason for Continuing Urinary Catheterization: Critically ill in ICU and requiring hourly monitoring of intake/output   Intake/Output Summary (Last 24  hours) at 10/17/2022 1758  Last data filed at 10/17/2022 1700  Gross per 24 hour   Intake 2046.27 ml   Output 3060 ml   Net -1013.73 ml        Nutritional Supplement Intake: Quantity 1, Type: Boost    Labs/Accuchecks:  Recent Labs   Lab 10/15/22  0329 10/15/22  0849 10/16/22  0400 10/17/22  0418   WBC 9.82  --  14.66* 14.62*   RBC 2.99*  --  3.14* 3.39*   HGB 8.4*  --  8.8* 9.3*   HCT 26.4* 33* 27.3* 29.0*     --  410 407      Recent Labs   Lab 10/15/22  0329 10/16/22  0400 10/17/22  0418   APTT 40.8* 40.2* 53.6*      Recent Labs     10/17/22  0418   *   K 3.1*   CO2 33*   CL 85*   BUN 30*   CREATININE 2.0*     No results for input(s): CPK, CPKMB, MB, TROPONINI in the last 168 hours.   Recent Labs     10/16/22  2027 10/17/22  0508 10/17/22  1510   PH 7.453* 7.467* 7.469*   PCO2 52.2* 54.7* 49.1*   PO2 29* 26* 28*   HCO3 36.5* 39.6* 35.7*   POCSATURATED 56* 49* 55*   BE 13 16 12       Electrolytes: Electrolytes replaced  Accuchecks: none    Gtts/LDAs:   sodium chloride 0.9% Stopped (09/28/22 1550)    sodium chloride 0.9% 5 mL/hr at 10/15/22 1051    DOBUTamine IV infusion (non-titrating) 3.75 mcg/kg/min (10/17/22 1700)    EPINEPHrine 0.06 mcg/kg/min (10/17/22 1700)    furosemide (LASIX) 10 mg/mL infusion (non-titrating) 20 mg/hr (10/17/22 1718)    heparin (porcine) in D5W 19 Units/kg/hr (10/17/22 1700)       Lines/Drains/Airways       Central Venous Catheter Line  Duration             Tunneled Central Line Insertion/Assessment - Triple Lumen  10/14/22 1800 right subclavian 2 days              Drain  Duration                  Urethral Catheter 10/17/22 0430 Double-lumen 16 Fr. <1 day              Peripheral Intravenous Line  Duration                  Midline Catheter Insertion/Assessment  - Single Lumen 10/14/22 1100 Right brachial vein 18g x 10cm 3 days                    Skin/Wounds  Bathing/Skin Care: bath, complete;incontinence care;linen changed (10/17/22 1600)  Wounds: No  Wound care consulted:  No    Problem: Pain Acute  Goal: Acceptable Pain Control and Functional Ability  Outcome: Ongoing, Progressing     Problem: Adult Inpatient Plan of Care  Goal: Patient-Specific Goal (Individualized)  Outcome: Ongoing, Progressing     Problem: Cardiac Output Decreased (Heart Failure)  Goal: Optimal Cardiac Output  Outcome: Ongoing, Progressing

## 2022-10-17 NOTE — PLAN OF CARE
Recommendations  1. Rec'd modifying diet to bland diet- per pt request   2. Continue Boost GC (strawberry flavor preferred) for optimization of protein and calorie intake  3. RD following     Goals: Meet % of EEN/EPN by RD f/u date  Nutrition Goal Status: progressing towards goal  Communication of RD Recs: other (POC)

## 2022-10-17 NOTE — CONSULTS
Palliative medicine received consult for goals of care. Chart reviewed and patient discussed with primary team resident.        Pal med APRN at bedside.  Mr. Oneil awake alert and oriented.  Appears amenable to palliative medicine.  Niece at bedside and Mr. Oneil agreeable to have conversation with niece at bedside   Palliative medicine introduced.     Advance Care Planning       Advanced care planning introduced.    - Mr. Oneil is not .   - legal next of kin are three adult children:  Sons: Allan Barcenas [phone], Audrey Oneil, III - contact information unknown, and daughter - contact information unknown.   - Mr. Oneil states if he is unable to make medical decisions, it is his wish that his significant other Pam Saucedo [phone].  Palliative medicine recommended HPOA.  Mr. Oneil amenable.     Brief goals of care conversation.      Mr. Oneil states he does not believe he will ever get the VAD and he is not going to get better.  He states his wish is to go home.  Pal med attempted to explore more.     Teresa at bedside had called the patient's sister, Riana .  Sister states they do not want palliative care services and palliative care APRN needs to leave.      Mr. Oneil appeared uncomfortable with this interaction and when asked he replied he does not wish to have palliative care.     Briefly spoke with the patient's sister, Riana.  She explained having a previous bad experience with palliative medicine at another hospital.    Pal med APRN  provided emotional support and contact information.  Assured family palliative medicine is available when and if there is a change in plans      Primary team notified of the above encounter.  Palliative medicine will sign off.  Please re-consult fi the patient reconsiders.      Thank you for the consult and opportunity to participate in Mr. Oneil's care.      20 mins spent in advanced care planning

## 2022-10-18 NOTE — PLAN OF CARE
[facility]   Heart Transplant Clinic  [address] [place] [address]   [phone] after hours        HOME  HEALTH ORDERS      Admit to Home Health    Diagnosis:   Patient Active Problem List   Diagnosis    Coronary artery disease involving native coronary artery of native heart without angina pectoris    Chronic combined systolic and diastolic heart failure    Obesity (BMI 30.0-34.9)    Cardiomyopathy, ischemic    Hx pulmonary embolism 2010 provoked dvt     Postural dizziness with presyncope    Acute hypoxemic respiratory failure    History of DVT (deep vein thrombosis)    Ventricular tachycardia    Hypertensive cardiovascular-renal disease, stage 1-4 or unspecified chronic kidney disease, with heart failure    Presence of cardiac resynchronization therapy defibrillator (CRT-D)    Mixed hyperlipidemia    Long term current use of amiodarone    Thoracic aortic atherosclerosis    Stage 3a chronic kidney disease    Prediabetes    LBBB (left bundle branch block)    Elevated troponin I level    Gout    Acute on chronic combined systolic and diastolic heart failure    H/O ventricular tachycardia    Hypoxia    Vitamin D deficiency    Blue skin    MRSA infection    Rotator cuff syndrome    Severe sepsis    Pressure injury of heel, stage 2    Dermatitis associated with moisture    Skin breakdown    Leukocytosis    Bacteremia due to methicillin resistant Staphylococcus aureus    MRSA bacteremia    Pancreatic lesion    Cellulitis of left forearm    Acute thrombosis of left brachial vein    Vasogenic shock    SSS (sick sinus syndrome)    Ischemic cardiomyopathy    Encounter for weaning from ventilator       Patient is homebound due to:   Diet: Low Sodium  Acitivities: As Tolerated    Nursing:   SN to complete comprehensive assessment including routine vital signs. Instruct on disease process and s/s of complications to report to MD. Review/verify medication list sent home  with the patient at time of discharge  and instruct patient/caregiver as needed. Frequency may be adjusted depending on start of care date.    Notify MD if SBP > 160 or < 90; DBP > 90 or < 50; HR > 120 or < 50; Temp > 101; Weight gain >3lbs in 1 day or 5lbs in 1 week.    LABS:  SN to perform labs: see below    HOME INFUSION THERAPY:   SN to perform Infusion Therapy/Central Line Care.  Review Central Line Care & Central Line Flush with patient.    Administer (drug and dose):     Daptomycin 900 mg, IV, every 24 hours  Dobutamine 2.5 mcg/kg/min x 98 kg, IV continuous infusion.     Last day of Daptomycin: 11/9/22    Order the following labs to be drawn on Mondays:   CBC  CMP   CPK        5) Fax Lab Results to Infectious Diseases Provider: Dr. Russell     [facility] Fax Number: [phone]       **For questions or concerns, please call [phone] and ask for Pre-Heart transplant clinic, M-F 8-5. After hours, weekends, call [phone] and ask for the Heart Transplant Cardiologist on call.**    Central line care:  Scrub the Hub: Prior to accessing the line, always perform a 30 second alcohol scrub  Each lumen of the central line is to be flushed at least daily with 10 mL Normal Saline and 3 mL Heparin flush (100 units/mL)  Skilled Nurse (SN) may draw blood from IV access  Blood Draw Procedure:   - Aspirate at least 5 mL of blood   - Discard   - Obtain specimen   - Change posiflow cap   - Flush with 20 mL Normal Saline followed by a                 3-5 mL Heparin flush (100 units/mL)  Central :   - Sterile dressing changes are done weekly and as needed.   - Use chlor-hexadine scrub to cleanse site, apply Biopatch to insertion site,       apply securement device dressing   - Posi-flow caps are changed weekly and after EVERY lab draw.   - If sterile gauze is under dressing to control oozing,                 dressing change must be performed every 24 hours until gauze is not needed.    CONSULTS:       AIM: Transition to hospice when appropriate.     Send initial Home Health orders to HTS attending physician on call.  Send follow up questions to [phone] or fax:                  Heart Failure:      [phone]

## 2022-10-18 NOTE — ASSESSMENT & PLAN NOTE
- Monitor WBC count and fever curve, pan-culture if patient spikes  - ID consult and recommendations are appreciated  - Vancomycin 1,250, daily; Monitor level and renal panel  -patient has persistent positive blood cultures.  Cultures from 18 no growth so far.  His currently on vancomycin.  Id on board.  - consult EP for ICD removal.  - WCC uptrending despite zosyn and dapto, will reach out to ID, but cultures from 2 days ago NGTD.

## 2022-10-18 NOTE — PROGRESS NOTES
Notified Ramu SMITH on call for HTS that patient is having frequent loose stools that started around 4 pm. Informed him that patient took miralax this morning.  Stated that he would check with Dr Nieto and notify me what the plan will be.  Will pass this to oncoming RN

## 2022-10-18 NOTE — PLAN OF CARE
Problem: Occupational Therapy  Goal: Occupational Therapy Goal  Description: Goals to be met by: 10/31/22     Patient will increase functional independence with ADLs by performing:    UE Dressing with Supervision.  LE Dressing with Supervision.  Grooming while standing at sink with Supervision.  Toileting from toilet with Supervision for hygiene and clothing management.   Toilet transfer to toilet with Supervision.    Outcome: Ongoing, Progressing

## 2022-10-18 NOTE — PROGRESS NOTES
Update - Discharge    Per Dr. Nieto Pt will discharge tomorrow with the addition of Advanced Illness Management (AIM) care with HH. [facility] called [facility] (p: [phone], f: [phone]) who states they consult with an outside palliative care company and will refer to hospice care when needed. [facility] informed Marixa at [facility] ([phone]; fax [phone]) that Pt will dispo tomorrow with  and IV ABX.  [facility] faxed updated HH orders to both [facility] and [facility] (f: [phone]).

## 2022-10-18 NOTE — ASSESSMENT & PLAN NOTE
- Dobutamine 2.5, Epi .02. Continue weaning epi down.   - Holding: Spironolactone 25 mg, daily, Jardiance and was previously on Entresto 49-51 mg, BID  - Transition from lasix gtt to IV boluses. CVP single digits.   - Daily weights, Strict I/Os  - Monitor electrolytes and Maintain Mg >2 and K >4  -Telemetry monitoring

## 2022-10-18 NOTE — PLAN OF CARE
Cardiac ICU Care Plan    POC reviewed with Audrey Oneil Jr. and family. Questions and concerns addressed. Pt progressing toward goals. Will continue to monitor. See below and flowsheets for full assessment and VS info.       Neuro:  [place] Coma Scale  Best Eye Response: 4-->(E4) spontaneous  Best Motor Response: 6-->(M6) obeys commands  Best Verbal Response: 5-->(V5) oriented  [place] Coma Scale Score: 15  Assessment Qualifiers: patient not sedated/intubated  Pupil PERRLA: yes    24 hr Temp:  [98 °F (36.7 °C)-99.5 °F (37.5 °C)]      CV:  Rhythm: normal sinus rhythm   DVT prophylaxis: VTE Required Core Measure: Pharmacological prophylaxis initiated/maintained    CVP (mean): (S) 7 mmHg (10/18/22 0305)    [REMOVED] PICC Double Lumen 09/22/22 1507 right basilic-Current Insertion Depth (cm): 36 cm (09/22/22 1507)  SVO2 (%): 49 % (10/17/22 0400)               Pulses  Right Radial Pulse: 2+ (normal)  Left Radial Pulse: 2+ (normal)  Right Dorsalis Pedis Pulse: 1+ (weak)  Left Dorsalis Pedis Pulse: 1+ (weak)  Right Posterior Tibial Pulse: 1+ (weak)  Left Posterior Tibial Pulse: 1+ (weak)    Resp:  O2 Device (Oxygen Therapy): nasal cannula  Flow (L/min): 4  Vent Mode: Spont  Set Rate: 20 BPM  Oxygen Concentration (%): 28  Vt Set: 500 mL  PEEP/CPAP: 5 cmH20  Pressure Support: 4 cmH20    GI/:  GI prophylaxis: yes  Diet/Nutrition Received: low saturated fat/low cholesterol, 2 gram sodium  Last Bowel Movement: 10/17/22  Voiding Characteristics: voids spontaneously without difficulty  [REMOVED]      Urethral Catheter 09/27/22 0755 Double-lumen 16 Fr.-Reason for Continuing Urinary Catheterization: Post operative, Critically ill in ICU and requiring hourly monitoring of intake/output       Urethral Catheter 10/17/22 0430 Double-lumen 16 Fr.-Reason for Continuing Urinary Catheterization: Critically ill in ICU and requiring hourly monitoring of intake/output   Intake/Output Summary (Last 24 hours) at 10/18/2022 0609  Last data  filed at 10/18/2022 0605  Gross per 24 hour   Intake 2250 ml   Output 2795 ml   Net -545 ml        Nutritional Supplement Intake: Quantity none, Type: Boost    Labs/Accuchecks:  Recent Labs   Lab 10/16/22  0400 10/17/22  0418 10/18/22  0311   WBC 14.66* 14.62* 15.34*   RBC 3.14* 3.39* 3.37*   HGB 8.8* 9.3* 9.2*   HCT 27.3* 29.0* 28.2*    407 474*      Recent Labs   Lab 10/16/22  0400 10/17/22  0418 10/18/22  0311   APTT 40.2* 53.6* 57.6*      Recent Labs     10/18/22  0311   *   K 4.0   CO2 31*   CL 85*   BUN 31*   CREATININE 2.3*     No results for input(s): CPK, CPKMB, MB, TROPONINI in the last 168 hours.   Recent Labs     10/17/22  1510 10/17/22  2015 10/18/22  0349   PH 7.469* 7.454* 7.482*   PCO2 49.1* 52.9* 49.1*   PO2 28* 29* 28*   HCO3 35.7* 37.1* 36.7*   POCSATURATED 55* 56* 56*   BE 12 13 13       Electrolytes: N/A - electrolytes WDL  Accuchecks: none    Gtts/LDAs:   sodium chloride 0.9% Stopped (09/28/22 1550)    sodium chloride 0.9% 5 mL/hr at 10/15/22 1051    DOBUTamine IV infusion (non-titrating) 3.75 mcg/kg/min (10/18/22 0605)    EPINEPHrine 0.06 mcg/kg/min (10/18/22 0605)    furosemide (LASIX) 10 mg/mL infusion (non-titrating) 20 mg/hr (10/18/22 0605)    heparin (porcine) in D5W 19 Units/kg/hr (10/18/22 0605)       Lines/Drains/Airways       Central Venous Catheter Line  Duration             Tunneled Central Line Insertion/Assessment - Triple Lumen  10/14/22 1800 right subclavian 3 days              Drain  Duration                  Urethral Catheter 10/17/22 0430 Double-lumen 16 Fr. 1 day              Peripheral Intravenous Line  Duration                  Midline Catheter Insertion/Assessment  - Single Lumen 10/14/22 1100 Right brachial vein 18g x 10cm 3 days                    Skin/Wounds  Bathing/Skin Care: bath, complete;incontinence care;linen changed (10/17/22 1600)  Wounds: No  Wound care consulted: No

## 2022-10-18 NOTE — PT/OT/SLP DISCHARGE
Physical Therapy Discharge Summary    Name: Audrey Oneil Jr.  MRN: [id number]   Principal Problem: Acute on chronic combined systolic and diastolic heart failure     Patient Discharged from acute Physical Therapy on 10/14/2022.     Assessment:     Patient transferred to higher level of care secondary to initiate epi    Objective:     GOALS:   Multidisciplinary Problems       Physical Therapy Goals          Problem: Physical Therapy    Goal Priority Disciplines Outcome Goal Variances Interventions   Physical Therapy Goal     PT, PT/OT Ongoing, Progressing     Description: Goals to be met by: 22    Patient will increase functional independence with mobility by performin. Sit to stand transfer with independence and maintaining sternal precautions- not met  2. Gait  x 300 feet with independence using LRAD as needed- not met  3. Ascend/descend 5 stair with bilateral handrails modified independence and maintaining sternal precautions using LRAD as needed- not met  4. Lower extremity exercise program x15 reps per handout, with independence- not met                         Reasons for Discontinuation of Therapy Services  Transfer to alternate level of care.      Plan:     [facility]      10/18/2022

## 2022-10-18 NOTE — SUBJECTIVE & OBJECTIVE
Interval History: Pt remains in the ICU. No complaints of pain, just general malaise, fatigue and wanting to go home and wants to get out of ICU. He is tolerating downtitration of epi today. Hoping to get off epi and down to the CSU tomorrow. Patient's wishes are to go home with HH with AIM program, hopefully tomorrow if stable. Of note WCC is uptrending again despite dapto, zosyn. Cultures from 2 days ago NGTD.     Hemodynamics     SVO2: 56%  CVP: 6  CO: 6.7  CI: 3.2  SVR: 840    Continue slow downtitration of epi. Transition IV diuresis to intermittent boluses.     Continuous Infusions:   sodium chloride 0.9% Stopped (09/28/22 1550)    sodium chloride 0.9% 5 mL/hr at 10/15/22 1051    DOBUTamine IV infusion (non-titrating) 2.5 mcg/kg/min (10/18/22 1636)    EPINEPHrine 0.02 mcg/kg/min (10/18/22 1636)    heparin (porcine) in D5W 19 Units/kg/hr (10/18/22 1636)     Scheduled Meds:   allopurinoL  200 mg Oral Daily    amiodarone  300 mg Oral Daily    aspirin  81 mg Oral Daily    atorvastatin  80 mg Oral QHS    DAPTOmycin (CUBICIN) IV (PEDS and ADULTS)  10 mg/kg Intravenous Q24H    famotidine  20 mg Oral Daily    furosemide (LASIX) injection  80 mg Intravenous TID    LIDOcaine  1 patch Transdermal Q24H    LIDOcaine HCl 2%  15 mL Oral Once    olopatadine  1 drop Both Eyes Daily    piperacillin-tazobactam (ZOSYN) IVPB  4.5 g Intravenous Q8H    polyethylene glycol  17 g Oral Daily    potassium chloride  40 mEq Oral Once    sucralfate  1 g Oral BID     PRN Meds:sodium chloride, acetaminophen, artificial tears, dextromethorphan-guaiFENesin  mg, heparin (PORCINE), heparin (PORCINE), HYDROcodone-acetaminophen, melatonin, methocarbamoL, ondansetron, senna-docusate 8.6-50 mg, sodium chloride 0.9%    Review of patient's allergies indicates:   Allergen Reactions    Iodine containing multivitamin Swelling     itching    Keflex [cephalexin] Swelling     Eyes.  Tolerated multiple doses of zosyn and 1 dose of augmentin in 2015 and  2016, respectively    Peaches [peach (prunus persica)] Swelling     eyes    Shellfish containing products Swelling    Fig tree Swelling     itching    Tuberculin spenser test ppd Rash     Objective:     Vital Signs (Most Recent):  Temp: 98 °F (36.7 °C) (10/18/22 1100)  Pulse: 100 (10/18/22 1600)  Resp: (!) 37 (10/18/22 1600)  BP: (!) 110/54 (10/18/22 1600)  SpO2: (!) 94 % (10/18/22 1600)   Vital Signs (24h Range):  Temp:  [98 °F (36.7 °C)-99.5 °F (37.5 °C)] 98 °F (36.7 °C)  Pulse:  [] 100  Resp:  [20-50] 37  SpO2:  [86 %-100 %] 94 %  BP: ()/(52-78) 110/54     No data found.  Body mass index is 31.35 kg/m².      Intake/Output Summary (Last 24 hours) at 10/18/2022 1643  Last data filed at 10/18/2022 1639  Gross per 24 hour   Intake 1618.4 ml   Output 2525 ml   Net -906.6 ml           Physical Exam  Constitutional:       General: He is not in acute distress.     Appearance: Normal appearance. He is normal weight. He is not ill-appearing or toxic-appearing.   HENT:      Head: Normocephalic and atraumatic.      Nose: Nose normal. No congestion.      Mouth/Throat:      Mouth: Mucous membranes are moist.      Pharynx: No oropharyngeal exudate.   Eyes:      General:         Right eye: No discharge.         Left eye: No discharge.      Extraocular Movements: Extraocular movements intact.      Pupils: Pupils are equal, round, and reactive to light.   Cardiovascular:      Rate and Rhythm: Normal rate and regular rhythm.      Heart sounds: No murmur heard.    No friction rub. No gallop.   Pulmonary:      Effort: Pulmonary effort is normal. No respiratory distress.      Breath sounds: Normal breath sounds. No wheezing, rhonchi or rales.   Abdominal:      General: Abdomen is flat. Bowel sounds are normal. There is no distension.      Palpations: Abdomen is soft.      Tenderness: There is no abdominal tenderness.   Musculoskeletal:         General: No swelling. Normal range of motion.      Cervical back: Normal range of  motion. No rigidity.      Right lower leg: Edema present.      Left lower leg: Edema present.   Skin:     General: Skin is warm and dry.      Findings: No lesion or rash.   Neurological:      General: No focal deficit present.      Mental Status: He is alert and oriented to person, place, and time.      Cranial Nerves: No cranial nerve deficit.      Motor: No weakness.       Significant Labs:  CBC:  Recent Labs   Lab 10/16/22  0400 10/17/22  0418 10/18/22  0311   WBC 14.66* 14.62* 15.34*   RBC 3.14* 3.39* 3.37*   HGB 8.8* 9.3* 9.2*   HCT 27.3* 29.0* 28.2*    407 474*   MCV 87 86 84   MCH 28.0 27.4 27.3   MCHC 32.2 32.1 32.6       BNP:  No results for input(s): BNP in the last 168 hours.    Invalid input(s): BNPTRIAGELBLO  CMP:  Recent Labs   Lab 10/13/22  0925 10/14/22  0400 10/16/22  0400 10/17/22  0418 10/18/22  0311   GLU  --    < > 157* 168* 171*   CALCIUM  --    < > 8.7 8.4* 8.9   ALBUMIN 2.8*  --   --   --   --    PROT 6.7  --   --   --   --    NA  --    < > 132* 131* 132*   K  --    < > 4.0 3.1* 4.0   CO2  --    < > 29 33* 31*   CL  --    < > 89* 85* 85*   BUN  --    < > 31* 30* 31*   CREATININE  --    < > 1.9* 2.0* 2.3*   ALKPHOS 86  --   --   --   --    ALT 19  --   --   --   --    AST 21  --   --   --   --    BILITOT 0.8  --   --   --   --     < > = values in this interval not displayed.        Coagulation:   Recent Labs   Lab 10/16/22  0400 10/17/22  0418 10/18/22  0311   APTT 40.2* 53.6* 57.6*       LDH:  No results for input(s): LDH in the last 72 hours.  Microbiology:  Microbiology Results (last 7 days)       Procedure Component Value Units Date/Time    Blood culture [[id number]] Collected: 10/16/22 1530    Order Status: Completed Specimen: Blood from Peripheral, Antecubital, Right Updated: 10/17/22 1812     Blood Culture, Routine No Growth to date      No Growth to date    Blood culture [[id number]] Collected: 10/16/22 1531    Order Status: Completed Specimen: Blood from Peripheral, Hand, Right  Updated: 10/17/22 1812     Blood Culture, Routine No Growth to date      No Growth to date    Respiratory Infection Panel (PCR), Nasopharyngeal [[id number]] Collected: 10/16/22 1254    Order Status: Completed Specimen: Nasopharyngeal Swab Updated: 10/16/22 1555     Respiratory Infection Panel Source NP Swab     Adenovirus Not Detected     Coronavirus 229E, Common Cold Virus Not Detected     Coronavirus HKU1, Common Cold Virus Not Detected     Coronavirus NL63, Common Cold Virus Not Detected     Coronavirus OC43, Common Cold Virus Not Detected     Comment: The Coronavirus strains detected in this test cause the common cold.  These strains are not the COVID-19 (novel Coronavirus)strain   associated with the respiratory disease outbreak.          SARS-CoV2 (COVID-19) Qualitative PCR Not Detected     Human Metapneumovirus Not Detected     Human Rhinovirus/Enterovirus Not Detected     Influenza A (subtypes H1, H1-2009,H3) Not Detected     Influenza B Not Detected     Parainfluenza Virus 1 Not Detected     Parainfluenza Virus 2 Not Detected     Parainfluenza Virus 3 Not Detected     Parainfluenza Virus 4 Not Detected     Respiratory Syncytial Virus Not Detected     Bordetella Parapertussis (DR6604) Not Detected     Bordetella pertussis (ptxP) Not Detected     Chlamydia pneumoniae Not Detected     Mycoplasma pneumoniae Not Detected    Narrative:      For all other respiratory sources, order [id number] -  Respiratory Viral Panel by PCR    Influenza A & B by Molecular [[id number]] Collected: 10/16/22 1300    Order Status: Completed Specimen: Nasopharyngeal Swab Updated: 10/16/22 1441     Influenza A, Molecular Negative     Influenza B, Molecular Negative     Flu A & B Source Nasal swab    Culture, Respiratory with Gram Stain [[id number]]     Order Status: No result Specimen: Respiratory             BMP:   Recent Labs   Lab 10/18/22  0311   *   *   K 4.0   CL 85*   CO2 31*   BUN 31*   CREATININE 2.3*   CALCIUM 8.9   MG  2.0       I have reviewed all pertinent labs within the past 24 hours.    Estimated Creatinine Clearance: 32.1 mL/min (A) (based on SCr of 2.3 mg/dL (H)).    Diagnostic Results:  Reviewed

## 2022-10-18 NOTE — PROGRESS NOTES
Interdisciplinary Rounds Report:   Attended interdisciplinary rounds with the [facility]/CTS services including the LVAD Coordinators, social workers, cardiologists, surgeons,  PT/OT/Speech, dietician, and unit charge nurses. Discussed patient status, plan of care, goals of care, including DC date, and post discharge needs. Plan of care will be discussed with the patient and/or family per the physician while rounding on the floor. This is a recurring meeting that is medically and socially necessary to collaborate with the interdisciplinary team to assist patient needs and safe discharge.

## 2022-10-18 NOTE — PT/OT/SLP RE-EVAL
"Occupational Therapy   Re-evaluation    Name: Audrey Oneil Jr.  MRN: [id number]  Admitting Diagnosis:  Acute on chronic combined systolic and diastolic heart failure  Recent Surgery: Procedure(s) (LRB):  ULTRASOUND, UPPER GI TRACT, ENDOSCOPIC (N/A) 14 Days Post-Op    Recommendations:     Discharge Recommendations: home  Discharge Equipment Recommendations:  none  Barriers to discharge:  None    Assessment:     Audrey Oneil Jr. is a 72 y.o. male with a medical diagnosis of Acute on chronic combined systolic and diastolic heart failure.  He presents to ICU for pressor support. Pt is able to mobilize around room and complete BADL with CGA, but easily fatigued.  Performance deficits affecting function are weakness, impaired balance, decreased safety awareness, impaired endurance, impaired cardiopulmonary response to activity, impaired self care skills, impaired functional mobility, gait instability.      Rehab Prognosis:  Fair; patient would benefit from acute skilled OT services to address these deficits and reach maximum level of function.       Plan:     Patient to be seen 2 x/week to address the above listed problems via self-care/home management, therapeutic activities, therapeutic exercises  Plan of Care Expires: 11/17/22  Plan of Care Reviewed with: patient    Subjective     Chief Complaint: "I'm going to die in 7 days. Maybe 6."  Patient/Family stated goals: to go home and spend time with kids  Communicated with: RN prior to session.  Pain/Comfort:  Location 1:  (Pt states, "Everything hurts," but does not rate pain)  Pain Addressed 1: Reposition, Distraction  Pain Rating Post-Intervention 1: 0/10    Objective:     Communicated with: RN prior to session.  Patient found supine with: blood pressure cuff, telemetry, pulse ox (continuous), oxygen, central line upon OT entry to room.    General Precautions: Standard, fall   Orthopedic Precautions:N/A   Braces:    Respiratory Status: Room air    Occupational " Performance:    Bed Mobility:    Patient completed Scooting/Bridging with stand by assistance  Patient completed Supine to Sit with stand by assistance    Functional Mobility/Transfers:  Patient completed Sit <> Stand Transfer with contact guard assistance  with  no assistive device   Patient completed Bed <> Chair Transfer using Step Transfer technique with contact guard assistance with no assistive device  Functional Mobility: CGA to/from sink and around room    Activities of Daily Living:  Feeding:  independence    Grooming: independence    Upper Body Dressing: contact guard assistance    Lower Body Dressing: contact guard assistance    Toileting: contact guard assistance      Cognitive/Visual Perceptual:  Oriented to: Person, Place, Time and Situation  Follows Commands/attention: Follows multistep  commands  Communication: clear/fluent  Memory:  No Deficits noted  Safety awareness/insight to disability: intact  Coping skills/emotional control: Appropriate to situation    Physical Exam:  Postural examination/scapula alignment:    -       No postural abnormalities identified  Sensation:    -       Intact  Upper Extremity Range of Motion:     -       Right Upper Extremity: WFL  -       Left Upper Extremity: WFL  Upper Extremity Strength:    -       Right Upper Extremity: WFL  -       Left Upper Extremity: WFL   Strength:    -       Right Upper Extremity: WFL  -       Left Upper Extremity: WFL  Fine Motor Coordination:    -       Intact  Gross motor coordination:   WFL      AMPAC 6 Click:  AMPAC Total Score: 20    Treatment & Education:  Pt ed on OT POC  Pt ed on safety awareness and need for assistance 2* weakness and multiple lines    Patient left  seated on couch with RN aware  with all lines intact, call button in reach, RN notified, and friend present    GOALS:   Multidisciplinary Problems       Occupational Therapy Goals          Problem: Occupational Therapy    Goal Priority Disciplines Outcome  Interventions   Occupational Therapy Goal     OT, PT/OT Ongoing, Progressing    Description: Goals to be met by: 10/31/22     Patient will increase functional independence with ADLs by performing:    UE Dressing with Supervision.  LE Dressing with Supervision.  Grooming while standing at sink with Supervision.  Toileting from toilet with Supervision for hygiene and clothing management.   Toilet transfer to toilet with Supervision.                         History:     Past Medical History:   Diagnosis Date    Acute hypoxemic respiratory failure 11/7/2015    Acute idiopathic gout of left knee 12/2/2019    Acute idiopathic gout of right elbow 9/23/2021    AICD (automatic cardioverter/defibrillator) present 12/13/2015      Anticoagulant long-term use     Bronchopneumonia 12/20/2021    CHF (congestive heart failure)     Chronic anticoagulation 5/5/2016    Chronic combined systolic and diastolic heart failure 11/26/2012    EF 10-20% on ECHO 2013    Chronic gout 12/2/2019    Clotting disorder     Coronary artery disease involving native coronary artery of native heart without angina pectoris 11/26/2012    Cath 10- Stents patent non-obstructive disease Cath 11-12015 non-obstructive disease     COVID-19 08/2021    Had antibody infusion    Diverticulosis of colon     DVT (deep venous thrombosis), unspecified laterality 11/12/2015    Dysphonia 1/28/2018    Essential hypertension 11/15/2015    Fine motor impairment 2/2/2021    Hyperlipidemia     Hypertensive heart disease with heart failure 5/5/2016    MI (myocardial infarction) 2009    x's 5    Nicotine abuse     Obesity 11/26/2012    Olecranon bursitis of right elbow 9/19/2021    Pulmonary embolism 2011    Renal disorder     LAVERNE    Right carpal tunnel syndrome 4/6/2018    Stented coronary artery 11/26/2012    LAD stent placed 10/17/2007     Trigger thumb of right hand 4/6/2018         Past Surgical History:   Procedure Laterality Date    APPENDECTOMY       BACK SURGERY      CARDIAC DEFIBRILLATOR PLACEMENT      CARDIAC SURGERY  2007    stent    CARPAL TUNNEL RELEASE Right 04/2018    COLONOSCOPY N/A 8/8/2022    Procedure: COLONOSCOPY;  Surgeon: Sascha Keenan MD;  Location: [facility] ENDO (2ND FLR);  Service: Endoscopy;  Laterality: N/A;  EF-15%  pre heart    AICD-St [facility]ri       [place]OVID [place] [facility] [place]inst mail-am prep-clears 4 hrs prior-tb    ENDOSCOPIC ULTRASOUND OF UPPER GASTROINTESTINAL TRACT N/A 10/4/2022    Procedure: ULTRASOUND, UPPER GI TRACT, ENDOSCOPIC;  Surgeon: Charlie Smith MD;  Location: [facility] ENDO (2ND FLR);  Service: Endoscopy;  Laterality: N/A;    INSERTION OF BIVENTRICULAR IMPLANTABLE CARDIOVERTER-DEFIBRILLATOR (ICD) N/A 5/3/2019    Procedure: INSERTION, ICD, BIVENTRICULAR;  Surgeon: Teofilo Pal MD;  Location: [facility] EP LAB;  Service: Cardiology;  Laterality: N/A;  ICH CM,  ICD UPGD BI-V,  SJM, MAC, FAS, 3PREP (dual ICD INSITU)    r knee scope      REVISION OF SKIN POCKET FOR CARDIOVERTER-DEFIBRILLATOR Left 5/3/2019    Procedure: Revision, Skin Pocket, For Cardioverter-Defibrillator;  Surgeon: Teofilo Pal MD;  Location: [facility] EP LAB;  Service: Cardiology;  Laterality: Left;    RIGHT HEART CATHETERIZATION Right 6/14/2021    Procedure: INSERTION, CATHETER, RIGHT HEART;  Surgeon: Lizz Nieto MD;  Location: [facility] CATH LAB;  Service: Cardiology;  Laterality: Right;    RIGHT HEART CATHETERIZATION Right 6/17/2022    Procedure: INSERTION, CATHETER, RIGHT HEART;  Surgeon: Migue Henry Jr., MD;  Location: [facility] CATH LAB;  Service: Cardiology;  Laterality: Right;    SPINE SURGERY      TONSILLECTOMY      TRIGGER FINGER RELEASE Right 04/2018    thumb       Time Tracking:     OT Date of Treatment: 10/18/22  OT Start Time: 1345  OT Stop Time: 1405  OT Total Time (min): 20 min    Billable Minutes:Re-eval 10  Therapeutic Activity 10    10/18/2022

## 2022-10-18 NOTE — PROGRESS NOTES
[facility] - Cardiac Intensive Care  Heart Transplant  Progress Note    Patient Name: Audrey Oneil Jr.  MRN: [id number]  Admission Date: 9/14/2022  Hospital Length of Stay: 34 days  Attending Physician: Lizz Nieto MD  Primary Care Provider: Primary Doctor No  Principal Problem:Acute on chronic combined systolic and diastolic heart failure    Subjective:     Interval History: Pt remains in the ICU. No complaints of pain, just general malaise, fatigue and wanting to go home and wants to get out of ICU. He is tolerating downtitration of epi today. Hoping to get off epi and down to the CSU tomorrow. Patient's wishes are to go home with HH with AIM program, hopefully tomorrow if stable. Of note WCC is uptrending again despite dapto, zosyn. Cultures from 2 days ago NGTD.     Hemodynamics     SVO2: 56%  CVP: 6  CO: 6.7  CI: 3.2  SVR: 840    Continue slow downtitration of epi. Transition IV diuresis to intermittent boluses.     Continuous Infusions:   sodium chloride 0.9% Stopped (09/28/22 1550)    sodium chloride 0.9% 5 mL/hr at 10/15/22 1051    DOBUTamine IV infusion (non-titrating) 2.5 mcg/kg/min (10/18/22 1636)    EPINEPHrine 0.02 mcg/kg/min (10/18/22 1636)    heparin (porcine) in D5W 19 Units/kg/hr (10/18/22 1636)     Scheduled Meds:   allopurinoL  200 mg Oral Daily    amiodarone  300 mg Oral Daily    aspirin  81 mg Oral Daily    atorvastatin  80 mg Oral QHS    DAPTOmycin (CUBICIN) IV (PEDS and ADULTS)  10 mg/kg Intravenous Q24H    famotidine  20 mg Oral Daily    furosemide (LASIX) injection  80 mg Intravenous TID    LIDOcaine  1 patch Transdermal Q24H    LIDOcaine HCl 2%  15 mL Oral Once    olopatadine  1 drop Both Eyes Daily    piperacillin-tazobactam (ZOSYN) IVPB  4.5 g Intravenous Q8H    polyethylene glycol  17 g Oral Daily    potassium chloride  40 mEq Oral Once    sucralfate  1 g Oral BID     PRN Meds:sodium chloride, acetaminophen, artificial tears, dextromethorphan-guaiFENesin  mg,  heparin (PORCINE), heparin (PORCINE), HYDROcodone-acetaminophen, melatonin, methocarbamoL, ondansetron, senna-docusate 8.6-50 mg, sodium chloride 0.9%    Review of patient's allergies indicates:   Allergen Reactions    Iodine containing multivitamin Swelling     itching    Keflex [cephalexin] Swelling     Eyes.  Tolerated multiple doses of zosyn and 1 dose of augmentin in 2015 and 2016, respectively    Peaches [peach (prunus persica)] Swelling     eyes    Shellfish containing products Swelling    Fig tree Swelling     itching    Tuberculin spenser test ppd Rash     Objective:     Vital Signs (Most Recent):  Temp: 98 °F (36.7 °C) (10/18/22 1100)  Pulse: 100 (10/18/22 1600)  Resp: (!) 37 (10/18/22 1600)  BP: (!) 110/54 (10/18/22 1600)  SpO2: (!) 94 % (10/18/22 1600)   Vital Signs (24h Range):  Temp:  [98 °F (36.7 °C)-99.5 °F (37.5 °C)] 98 °F (36.7 °C)  Pulse:  [] 100  Resp:  [20-50] 37  SpO2:  [86 %-100 %] 94 %  BP: ()/(52-78) 110/54     No data found.  Body mass index is 31.35 kg/m².      Intake/Output Summary (Last 24 hours) at 10/18/2022 1643  Last data filed at 10/18/2022 1639  Gross per 24 hour   Intake 1618.4 ml   Output 2525 ml   Net -906.6 ml           Physical Exam  Constitutional:       General: He is not in acute distress.     Appearance: Normal appearance. He is normal weight. He is not ill-appearing or toxic-appearing.   HENT:      Head: Normocephalic and atraumatic.      Nose: Nose normal. No congestion.      Mouth/Throat:      Mouth: Mucous membranes are moist.      Pharynx: No oropharyngeal exudate.   Eyes:      General:         Right eye: No discharge.         Left eye: No discharge.      Extraocular Movements: Extraocular movements intact.      Pupils: Pupils are equal, round, and reactive to light.   Cardiovascular:      Rate and Rhythm: Normal rate and regular rhythm.      Heart sounds: No murmur heard.    No friction rub. No gallop.   Pulmonary:      Effort: Pulmonary effort is  normal. No respiratory distress.      Breath sounds: Normal breath sounds. No wheezing, rhonchi or rales.   Abdominal:      General: Abdomen is flat. Bowel sounds are normal. There is no distension.      Palpations: Abdomen is soft.      Tenderness: There is no abdominal tenderness.   Musculoskeletal:         General: No swelling. Normal range of motion.      Cervical back: Normal range of motion. No rigidity.      Right lower leg: Edema present.      Left lower leg: Edema present.   Skin:     General: Skin is warm and dry.      Findings: No lesion or rash.   Neurological:      General: No focal deficit present.      Mental Status: He is alert and oriented to person, place, and time.      Cranial Nerves: No cranial nerve deficit.      Motor: No weakness.       Significant Labs:  CBC:  Recent Labs   Lab 10/16/22  0400 10/17/22  0418 10/18/22  0311   WBC 14.66* 14.62* 15.34*   RBC 3.14* 3.39* 3.37*   HGB 8.8* 9.3* 9.2*   HCT 27.3* 29.0* 28.2*    407 474*   MCV 87 86 84   MCH 28.0 27.4 27.3   MCHC 32.2 32.1 32.6       BNP:  No results for input(s): BNP in the last 168 hours.    Invalid input(s): BNPTRIAGELBLO  CMP:  Recent Labs   Lab 10/13/22  0925 10/14/22  0400 10/16/22  0400 10/17/22  0418 10/18/22  0311   GLU  --    < > 157* 168* 171*   CALCIUM  --    < > 8.7 8.4* 8.9   ALBUMIN 2.8*  --   --   --   --    PROT 6.7  --   --   --   --    NA  --    < > 132* 131* 132*   K  --    < > 4.0 3.1* 4.0   CO2  --    < > 29 33* 31*   CL  --    < > 89* 85* 85*   BUN  --    < > 31* 30* 31*   CREATININE  --    < > 1.9* 2.0* 2.3*   ALKPHOS 86  --   --   --   --    ALT 19  --   --   --   --    AST 21  --   --   --   --    BILITOT 0.8  --   --   --   --     < > = values in this interval not displayed.        Coagulation:   Recent Labs   Lab 10/16/22  0400 10/17/22  0418 10/18/22  0311   APTT 40.2* 53.6* 57.6*       LDH:  No results for input(s): LDH in the last 72 hours.  Microbiology:  Microbiology Results (last 7 days)        Procedure Component Value Units Date/Time    Blood culture [[id number]] Collected: 10/16/22 1530    Order Status: Completed Specimen: Blood from Peripheral, Antecubital, Right Updated: 10/17/22 1812     Blood Culture, Routine No Growth to date      No Growth to date    Blood culture [[id number]] Collected: 10/16/22 1531    Order Status: Completed Specimen: Blood from Peripheral, Hand, Right Updated: 10/17/22 1812     Blood Culture, Routine No Growth to date      No Growth to date    Respiratory Infection Panel (PCR), Nasopharyngeal [[id number]] Collected: 10/16/22 1254    Order Status: Completed Specimen: Nasopharyngeal Swab Updated: 10/16/22 1555     Respiratory Infection Panel Source NP Swab     Adenovirus Not Detected     Coronavirus 229E, Common Cold Virus Not Detected     Coronavirus HKU1, Common Cold Virus Not Detected     Coronavirus NL63, Common Cold Virus Not Detected     Coronavirus OC43, Common Cold Virus Not Detected     Comment: The Coronavirus strains detected in this test cause the common cold.  These strains are not the COVID-19 (novel Coronavirus)strain   associated with the respiratory disease outbreak.          SARS-CoV2 (COVID-19) Qualitative PCR Not Detected     Human Metapneumovirus Not Detected     Human Rhinovirus/Enterovirus Not Detected     Influenza A (subtypes H1, H1-2009,H3) Not Detected     Influenza B Not Detected     Parainfluenza Virus 1 Not Detected     Parainfluenza Virus 2 Not Detected     Parainfluenza Virus 3 Not Detected     Parainfluenza Virus 4 Not Detected     Respiratory Syncytial Virus Not Detected     Bordetella Parapertussis (QA6346) Not Detected     Bordetella pertussis (ptxP) Not Detected     Chlamydia pneumoniae Not Detected     Mycoplasma pneumoniae Not Detected    Narrative:      For all other respiratory sources, order [id number] -  Respiratory Viral Panel by PCR    Influenza A & B by Molecular [[id number]] Collected: 10/16/22 1300    Order Status: Completed Specimen:  "Nasopharyngeal Swab Updated: 10/16/22 1441     Influenza A, Molecular Negative     Influenza B, Molecular Negative     Flu A & B Source Nasal swab    Culture, Respiratory with Gram Stain [[id number]]     Order Status: No result Specimen: Respiratory             BMP:   Recent Labs   Lab 10/18/22  0311   *   *   K 4.0   CL 85*   CO2 31*   BUN 31*   CREATININE 2.3*   CALCIUM 8.9   MG 2.0       I have reviewed all pertinent labs within the past 24 hours.    Estimated Creatinine Clearance: 32.1 mL/min (A) (based on SCr of 2.3 mg/dL (H)).    Diagnostic Results:  Reviewed     Assessment and Plan:     73 yo WM being worked up for LVAD since hospitalization January 2022 for recurrent VT (he thinks had MI) and cardiogenic shock at [facility]. He was  later seen in [facility] where he was treated for cardiogenic shock and sent home on .  He remains on  and is pursuing evaluation for LVAD.     He has had a couple of observation admissions where he was told to be dry at visit June 2022. At July 2022 visit he was noted that he could not tolerate higher doses of meds due to symptomatic hypotension.  He still notes occasional dizzy spells when he 1st stands but there are not too bad at this time.  Uses Lasix only as needed targeting his home weight 218-220.      His case was discussed at selection committee and he was deferred pending evaluation of pancreatic mass.  They wish to proceed with MRI but not sure with his ICD if this will be possible. Home weight this AM was 216#.    Interval History:  Today he comes in due to abdominal discomfort. He ate packaged spaghetti last night and has since been with abdominal pain and dry heaving. No vomiting or diarrhea. He intermittently uses home oxygen 4L and felt the need to start using last night. He states he feels like " a bug got him". He reports noticing that his HR was significantly elevated over night about 100-115bpm. He is compliant with his lasix. No lower extremity edema. " Felt warm this morning. His abdomen appears distended however he reports it normally gets firm/tight when volume overloaded, which is not the case at this time.     Bedside echo shows EF approx 25-30%. IVC measuring approx 1.4cm and collapsible. No JVD. CXR shows bilateral pulmonary congestion.    Echo 9/14/22   Moderate left atrial enlargement.   The left ventricle is severely enlarged with eccentric hypertrophy and severely decreased systolic function.   There is severe left ventricular global hypokinesis with anterospetal and apical scar. The estimated ejection fraction is 10-15%.   Left ventricular diastolic dysfunction.   Normal right ventricular size with normal right ventricular systolic function.   Mild mitral regurgitation.   Normal central venous pressure (3 mmHg).   There is no significant tricuspid regurgitation and therefore the pulmonary artery systolic pressure cannot be reported.         * Acute on chronic combined systolic and diastolic heart failure  - Dobutamine 2.5, Epi .02. Continue weaning epi down.   - Holding: Spironolactone 25 mg, daily, Jardiance and was previously on Entresto 49-51 mg, BID  - Transition from lasix gtt to IV boluses. CVP single digits.   - Daily weights, Strict I/Os  - Monitor electrolytes and Maintain Mg >2 and K >4  -Telemetry monitoring    Acute thrombosis of left brachial vein  Started on Heparin. IV over left arm replaced with rt arm iv. Will plan to get a PICC over the rt arm and monitor CVP and Mixed venous oxygen.     Pancreatic lesion  - Gastroenterology consult and recommendations appreciated  - Unable to obtain MRI for further evaluation given CRT-D  - Will hold off on obtaining CT with pancreatic protocol at this time in view of active infection requiring vancomycin and risk of contrast-induced nephrotoxicity in this patient    Bacteremia due to methicillin resistant Staphylococcus aureus  - Monitor WBC count and fever curve, pan-culture if patient  spikes  - ID consult and recommendations are appreciated  - Vancomycin 1,250, daily; Monitor level and renal panel  -patient has persistent positive blood cultures.  Cultures from 18 no growth so far.  His currently on vancomycin.  Id on board.  - consult EP for ICD removal.  - WCC uptrending despite zosyn and dapto, will reach out to ID, but cultures from 2 days ago NGTD.     H/O ventricular tachycardia  - Amiodarone 300 mg, daily  - Monitor LFTs    Coronary artery disease involving native coronary artery of native heart without angina pectoris  - Asprin 81 mg, daily  - Atorvastatin 80 mg, nightly      Uninterrupted Critical Care/Counseling Time (not including procedures): 45 min.      Venkatesh Love PA-C  Heart Transplant  [facility] - Cardiac Int[facility]

## 2022-10-19 PROBLEM — B95.62 BACTEREMIA DUE TO METHICILLIN RESISTANT STAPHYLOCOCCUS AUREUS: Status: RESOLVED | Noted: 2022-01-01 | Resolved: 2022-01-01

## 2022-10-19 PROBLEM — R78.81 BACTEREMIA DUE TO METHICILLIN RESISTANT STAPHYLOCOCCUS AUREUS: Status: RESOLVED | Noted: 2022-01-01 | Resolved: 2022-01-01

## 2022-10-19 NOTE — PLAN OF CARE
Cardiac ICU Care Plan    POC reviewed with Audrey Oneil Jr. and family. Questions and concerns addressed. Pt progressing toward goals. Will continue to monitor. See below and flowsheets for full assessment and VS info.       Neuro:  [place] Coma Scale  Best Eye Response: 4-->(E4) spontaneous  Best Motor Response: 6-->(M6) obeys commands  Best Verbal Response: 5-->(V5) oriented  [place] Coma Scale Score: 15  Assessment Qualifiers: patient not sedated/intubated  Pupil PERRLA: yes    24 hr Temp:  [98 °F (36.7 °C)-98.9 °F (37.2 °C)]      CV:  Rhythm: normal sinus rhythm   DVT prophylaxis: VTE Required Core Measure: Pharmacological prophylaxis initiated/maintained    CVP (mean): (S) 5 mmHg (10/19/22 0305)    [REMOVED] PICC Double Lumen 09/22/22 1507 right basilic-Current Insertion Depth (cm): 36 cm (09/22/22 1507)  SVO2 (%): 49 % (10/17/22 0400)               Pulses  Right Radial Pulse: 2+ (normal)  Left Radial Pulse: 2+ (normal)  Right Dorsalis Pedis Pulse: 1+ (weak)  Left Dorsalis Pedis Pulse: 1+ (weak)  Right Posterior Tibial Pulse: 1+ (weak)  Left Posterior Tibial Pulse: 1+ (weak)    Resp:  O2 Device (Oxygen Therapy): nasal cannula w/ humidification  Flow (L/min): 3  Vent Mode: Spont  Set Rate: 20 BPM  Oxygen Concentration (%): 28  Vt Set: 500 mL  PEEP/CPAP: 5 cmH20  Pressure Support: 4 cmH20    GI/:  GI prophylaxis: yes  Diet/Nutrition Received: low saturated fat/low cholesterol  Last Bowel Movement: 10/18/22  Voiding Characteristics: voids spontaneously without difficulty  [REMOVED]      Urethral Catheter 09/27/22 0755 Double-lumen 16 Fr.-Reason for Continuing Urinary Catheterization: Post operative, Critically ill in ICU and requiring hourly monitoring of intake/output  [REMOVED]      Urethral Catheter 10/17/22 0430 Double-lumen 16 Fr.-Reason for Continuing Urinary Catheterization: Critically ill in ICU and requiring hourly monitoring of intake/output   Intake/Output Summary (Last 24 hours) at 10/19/2022  0609  Last data filed at 10/19/2022 0605  Gross per 24 hour   Intake 1609.05 ml   Output 1490 ml   Net 119.05 ml        Nutritional Supplement Intake: Quantity none, Type: Boost    Labs/Accuchecks:  Recent Labs   Lab 10/17/22  0418 10/18/22  0311 10/19/22  0326   WBC 14.62* 15.34* 15.27*   RBC 3.39* 3.37* 3.34*   HGB 9.3* 9.2* 9.1*   HCT 29.0* 28.2* 28.4*    474* 442      Recent Labs   Lab 10/17/22  0418 10/18/22  0311 10/19/22  0326   APTT 53.6* 57.6* 55.9*      Recent Labs     10/19/22  0326   *   K 3.6   CO2 29   CL 85*   BUN 36*   CREATININE 2.4*     No results for input(s): CPK, CPKMB, MB, TROPONINI in the last 168 hours.   Recent Labs     10/18/22  1341 10/18/22  2001 10/19/22  0440   PH 7.435 7.454* 7.491*   PCO2 51.8* 54.0* 48.8*   PO2 28* 28* 29*   HCO3 34.8* 37.9* 37.2*   POCSATURATED 52* 55* 59*   BE 11 14 14       Electrolytes: Electrolytes replaced  Accuchecks: none    Gtts/LDAs:   sodium chloride 0.9% Stopped (09/28/22 1550)    sodium chloride 0.9% 5 mL/hr at 10/15/22 1051    DOBUTamine IV infusion (non-titrating) 2.5 mcg/kg/min (10/19/22 0605)    EPINEPHrine 0.02 mcg/kg/min (10/19/22 0605)    heparin (porcine) in D5W 19 Units/kg/hr (10/19/22 0605)       Lines/Drains/Airways       Central Venous Catheter Line  Duration             Tunneled Central Line Insertion/Assessment - Triple Lumen  10/14/22 1800 right subclavian 4 days              Peripheral Intravenous Line  Duration                  Midline Catheter Insertion/Assessment  - Single Lumen 10/14/22 1100 Right brachial vein 18g x 10cm 4 days                    Skin/Wounds  Bathing/Skin Care: patient refused (states he will wait until his wife gets here to take a bath) (10/18/22 0701)  Wounds: No  Wound care consulted: No

## 2022-10-19 NOTE — ANESTHESIA POSTPROCEDURE EVALUATION
Anesthesia Post Evaluation    Patient: Audrey Oneil Jr.    Procedure(s) Performed: * No procedures listed *    Final Anesthesia Type: general      Patient location during evaluation: PACU  Patient participation: Yes- Able to Participate  Level of consciousness: awake and alert  Post-procedure vital signs: reviewed and stable  Pain management: adequate  Airway patency: patent  JESSICA mitigation strategies: Multimodal analgesia  PONV status at discharge: No PONV  Anesthetic complications: no      Cardiovascular status: blood pressure returned to baseline and hemodynamically stable  Respiratory status: unassisted  Hydration status: euvolemic  Follow-up not needed.          Vitals Value Taken Time   /59 10/19/22 0902   Temp 37.2 °C (98.9 °F) 10/19/22 0305   Pulse 95 10/19/22 0923   Resp 33 10/19/22 0923   SpO2 94 % 10/19/22 0923   Vitals shown include unvalidated device data.      Event Time   Out of Recovery 10/14/2022 19:49:00         Pain/Magaly Score: Pain Rating Prior to Med Admin: 8 (10/19/2022  8:52 AM)  Pain Rating Post Med Admin: 3 (10/18/2022  1:21 PM)

## 2022-10-19 NOTE — TELEPHONE ENCOUNTER
Patient discussed with committee today 10/19/2022 to determine LVAD candidacy. Patient declined for LVAD at this time due to being too ill secondary to worsening renal and pulmonary function.     Plan for patient to discharge home with AIM care with HH per pt wishes. He will be discharged on IV Abx (recent bacteremia) and Dobutamine infusion.

## 2022-10-19 NOTE — PLAN OF CARE
No acute events throughout day, VS and assessment per flow sheet, see below for updates:    Pulmonary: 4l NC, sob on exertion    Cardiovascular: SR -ST    Neurological: aao, afebrile    Gastrointestinal: Cardiac diet 1500 FR BM 10/18    Genitourinary: urinal clear yellow    Endocrine: see doc flow    Skin/Bath: see doc flow   Date of last CHG bath given: 10/18    Infusions: dobumatine, epi, heparin     Patient progressing towards goals as tolerated, plan of care communicated and reviewed with Audrey Oneil Jr. and family, all concerns addressed, will continue to monitor.     KIRBY NAZARIO, RN     CICU DAILY GOALS       A: Awake    RASS: Goal - RASS Goal: 0-->alert and calm  Actual - RASS (Kilpatrick Agitation-Sedation Scale): 0-->alert and calm   Restraint necessity: Clinical Justification: Treatment Interference, Removing medical devices  B: Breath   SBT: Not intubated   C: Coordinate A & B, analgesics/sedatives   Pain: managed    SAT: Not intubated  D: Delirium   CAM-ICU: Overall CAM-ICU: Negative  E: Early(intubated/ Progressive (non-intubated) Mobility   MOVE Screen: Pass   Activity: Activity Management: Rolling - L1  FAS: Feeding/Nutrition   Diet order: Diet/Nutrition Received: low saturated fat/low cholesterol,   Fluid restriction: Fluid Requirement: 1500fr   Nutritional Supplement Intake: Quantity 0, Type: Boost  T: Thrombus   DVT prophylaxis: VTE Required Core Measure: Pharmacological prophylaxis initiated/maintained  H: HOB Elevation   Head of Bed (HOB) Positioning: HOB at 20-30 degrees  U: Ulcer Prophylaxis   GI: yes  G: Glucose control   managed Glycemic Management: blood glucose monitored  S: Skin   Bundle compliance: yes   Bathing/Skin Care: patient refused (states he will wait until his wife gets here to take a bath) Date: 10/18  B: Bowel Function   diarrhea   I: Indwelling Catheters   Seay necessity: [REMOVED]      Urethral Catheter 09/27/22 0755 Double-lumen 16 Fr.-Reason for Continuing Urinary  Catheterization: Post operative, Critically ill in ICU and requiring hourly monitoring of intake/output  [REMOVED]      Urethral Catheter 09/29/22 1600-Reason for Continuing Urinary Catheterization: Urinary retention, Critically ill in ICU and requiring hourly monitoring of intake/output  [REMOVED]      Urethral Catheter 10/17/22 0430 Double-lumen 16 Fr.-Reason for Continuing Urinary Catheterization: Critically ill in ICU and requiring hourly monitoring of intake/output   CVC necessity: Yes   IPAD offered: Not appropriate  D: De-escalation Antibx   Yes  Plan for the day   Patient made DNR DNI today  Family/Goals of care/Code Status   Code Status: DNR     No acute events throughout day, VS and assessment per flow sheet, patient progressing towards goals as tolerated, plan of care reviewed with Audrey Oneil Jr. and family, all concerns addressed, will continue to monitor. Progress Note  Critical Care    Admit Date: 9/14/2022   LOS: 34 days     SUBJECTIVE:         Continuous Infusions:   sodium chloride 0.9% Stopped (09/28/22 1550)    sodium chloride 0.9% 5 mL/hr at 10/15/22 1051    DOBUTamine IV infusion (non-titrating) 2.5 mcg/kg/min (10/18/22 1801)    EPINEPHrine 0.02 mcg/kg/min (10/18/22 1801)    heparin (porcine) in D5W 19 Units/kg/hr (10/18/22 1801)     Scheduled Meds:   allopurinoL  200 mg Oral Daily    amiodarone  300 mg Oral Daily    aspirin  81 mg Oral Daily    atorvastatin  80 mg Oral QHS    DAPTOmycin (CUBICIN) IV (PEDS and ADULTS)  10 mg/kg Intravenous Q24H    famotidine  20 mg Oral Daily    furosemide (LASIX) injection  80 mg Intravenous TID    LIDOcaine  1 patch Transdermal Q24H    LIDOcaine HCl 2%  15 mL Oral Once    olopatadine  1 drop Both Eyes Daily    piperacillin-tazobactam (ZOSYN) IVPB  4.5 g Intravenous Q8H    polyethylene glycol  17 g Oral Daily    potassium chloride  40 mEq Oral Once    sucralfate  1 g Oral BID     PRN Meds:sodium chloride, acetaminophen, artificial tears,  dextromethorphan-guaiFENesin  mg, heparin (PORCINE), heparin (PORCINE), HYDROcodone-acetaminophen, melatonin, methocarbamoL, ondansetron, senna-docusate 8.6-50 mg, sodium chloride 0.9%    Review of patient's allergies indicates:   Allergen Reactions    Iodine containing multivitamin Swelling     itching    Keflex [cephalexin] Swelling     Eyes.  Tolerated multiple doses of zosyn and 1 dose of augmentin in 2015 and 2016, respectively    Peaches [peach (prunus persica)] Swelling     eyes    Shellfish containing products Swelling    Fig tree Swelling     itching    Tuberculin spenser test ppd Rash       OBJECTIVE:     Vital Signs (Most Recent)  Temp: 98.7 °F (37.1 °C) (10/18/22 1500)  Pulse: 103 (10/18/22 1800)  Resp: (!) 38 (10/18/22 1800)  BP: (!) 96/56 (10/18/22 1800)  SpO2: 99 % (10/18/22 1800)    Vital Signs Range (Last 24H):  Temp:  [98 °F (36.7 °C)-99.5 °F (37.5 °C)]   Pulse:  []   Resp:  [20-50]   BP: ()/(52-78)   SpO2:  [86 %-99 %]     I & O (Last 24H):  Intake/Output Summary (Last 24 hours) at 10/18/2022 1905  Last data filed at 10/18/2022 1801  Gross per 24 hour   Intake 1742.54 ml   Output 2150 ml   Net -407.46 ml     Ventilator Data (Last 24H):          Hemodynamic Parameters (Last 24H):         Lines/Drains:  Tunneled Central Line Insertion/Assessment - Triple Lumen  10/14/22 1800 right subclavian (Active)   Line Necessity Review Hemodynamic instability 10/17/22 1930   Verification by X-ray Yes 10/18/22 0701   Site Assessment No drainage;No redness;No swelling;No warmth 10/18/22 1205   Line Securement Device Secured with sutures 10/18/22 1205   Dressing Type Biopatch in place;Central line dressing 10/18/22 1501   Dressing Status Clean;Dry;Intact 10/18/22 1501   Dressing Intervention Integrity maintained 10/18/22 1501   Date on Dressing 10/14/22 10/18/22 1205   Dressing Due to be Changed 10/21/22 10/18/22 1205   Distal Patency/Care infusing 10/18/22 1501   Medial 1 Patency/Care infusing  10/18/22 1501   Waveform Normal 10/18/22 1501   Number of days: 4            Midline Catheter Insertion/Assessment  - Single Lumen 10/14/22 1100 Right brachial vein 18g x 10cm (Active)   Site Assessment Clean;Dry;No swelling;No redness;Intact 10/18/22 1501   IV Device Securement catheter securement device 10/18/22 1501   Line Status Infusing 10/18/22 1501   Dressing Type Biopatch in place 10/18/22 1501   Dressing Status Clean;Dry;Intact 10/18/22 1501   Dressing Intervention Integrity maintained 10/18/22 1501   Dressing Change Due 10/21/22 10/18/22 1501   Site Change Due 11/12/22 10/18/22 1205   Reason Not Rotated Not due 10/18/22 1205   Number of days: 4       Laboratory (Last 24H):  CBC:  Recent Labs   Lab 10/18/22  0311   WBC 15.34*   HGB 9.2*   HCT 28.2*   *     CMP:  Recent Labs   Lab 10/18/22  0311   CALCIUM 8.9   *   K 4.0   CO2 31*   CL 85*   BUN 31*   CREATININE 2.3*         ASSESSMENT/PLAN:       KIRBY NAZARIO RN  10/18/2022  7:05 PM

## 2022-10-19 NOTE — DISCHARGE SUMMARY
"[facility] - Cardiac Intensive Care  Heart Transplant  Discharge Summary      Patient Name: Audrey Oneil Jr.  MRN: [id number]  Admission Date: 9/14/2022  Hospital Length of Stay: 35 days  Discharge Date and Time: 10/19/2022 3:20 PM  Attending Physician: Lizz Nieto MD   Discharging Provider: Venkatesh Love PA-C  Primary Care Provider: Primary Doctor Angelika     HPI: 73 yo WM being worked up for LVAD since hospitalization January 2022 for recurrent VT (he thinks had MI) and cardiogenic shock at [facility]. He was  later seen in [facility] where he was treated for cardiogenic shock and sent home on .  He remains on  and is pursuing evaluation for LVAD.     He has had a couple of observation admissions where he was told to be dry at visit June 2022. At July 2022 visit he was noted that he could not tolerate higher doses of meds due to symptomatic hypotension.  He still notes occasional dizzy spells when he 1st stands but there are not too bad at this time.  Uses Lasix only as needed targeting his home weight 218-220.      His case was discussed at selection committee and he was deferred pending evaluation of pancreatic mass.  They wish to proceed with MRI but not sure with his ICD if this will be possible. Home weight this AM was 216#.    Interval History:  Today he comes in due to abdominal discomfort. He ate packaged spaghetti last night and has since been with abdominal pain and dry heaving. No vomiting or diarrhea. He intermittently uses home oxygen 4L and felt the need to start using last night. He states he feels like " a bug got him". He reports noticing that his HR was significantly elevated over night about 100-115bpm. He is compliant with his lasix. No lower extremity edema. Felt warm this morning. His abdomen appears distended however he reports it normally gets firm/tight when volume overloaded, which is not the case at this time.     Bedside echo shows EF approx 25-30%. IVC measuring approx 1.4cm and collapsible. No " JVD. CXR shows bilateral pulmonary congestion.    Echo 9/14/22   Moderate left atrial enlargement.   The left ventricle is severely enlarged with eccentric hypertrophy and severely decreased systolic function.   There is severe left ventricular global hypokinesis with anterospetal and apical scar. The estimated ejection fraction is 10-15%.   Left ventricular diastolic dysfunction.   Normal right ventricular size with normal right ventricular systolic function.   Mild mitral regurgitation.   Normal central venous pressure (3 mmHg).   There is no significant tricuspid regurgitation and therefore the pulmonary artery systolic pressure cannot be reported.         Procedure(s) (LRB):  ULTRASOUND, UPPER GI TRACT, ENDOSCOPIC (N/A)     Hospital Course: Mr Oneil got admitted 9/14 for ADHF and found to have MRSA bacteremia thought to be secondary to forearm abrasion. He was deferred at selection for workup of a pancreatic lesion noted and was unable to get MRI at the time with CRT-D and renal function precluding contrast CT. His w/u for bacteremia included 9/21 HARRY negative for vegetations/endocarditis. On 9/27 CRT generator and lead extraction, pocket cx => hypotensive post-procedure requiring Epi, ICU. 9/28 off Epi, transfer to CSU. On 9/29 he had sudden hypoxemic respiratory failure requiring urgent intubation after sats dropped while laying flat for PICC line => CXR negative for PTX, tx ICU, panculture. Extubated on 9/30.   - For underlying MRSA bacteremia he'll be on daptomycin through 11/9.   He is unfortunately too sick (hypoxia unable to wean O2 lower than 3L despite adquate diuresis, severe debility, kidney dysfucntion) and too weak to tolerate a major cardiac surgery such as a LVAD implant or even ICD reimplantation. After GOC discussion with mathew and his family it was decided to discharge home with  with AIM program who can transition to hospice when appropriate. Will d/c on 2.5  and daptomycin.  LifeVest on discharge      Goals of Care Treatment Preferences:  Code Status: DNR    Health care agent: Pam Saucedo  [facility] agent number: [phone]          What is most important right now is to focus on curative/life-prolongation (regardless of treatment burdens).  Accordingly, we have decided that the best plan to meet the patient's goals includes continuing with treatment.      Consults (From admission, onward)        Status Ordering Provider     Inpatient consult to Infectious Diseases  Once        Provider:  (Not yet assigned)    Acknowledged LUANA BARRAGAN     Inpatient consult to Palliative Care  Once        Provider:  (Not yet assigned)    Completed LUANA BARRAGAN     Inpatient consult to Midline team  Once        Provider:  (Not yet assigned)    Completed CALOS, MAGDY     Inpatient consult to Interventional Radiology  Once        Provider:  (Not yet assigned)    Completed CALOS, MAGDY     [facility]  Once        Provider:  (Not yet assigned)    Completed CALOS, MAGDY     Inpatient consult to Registered Dietitian/Nutritionist  Once        Provider:  (Not yet assigned)    Completed PRASANNA REYNOSO     Inpatient consult to Infectious Diseases  Once        Provider:  (Not yet assigned)    Completed CALOS, MAGDY     Inpatient consult to PICC team ([facility])  Once        Provider:  (Not yet assigned)    Completed CALOS, MAGDY     Inpatient consult to Social Work/Case Management  Once        Provider:  (Not yet assigned)    Acknowledged BEV MONTELONGO     Inpatient [facility] team [facility]  Once        Provider:  (Not yet assigned)    Completed LISA REID     Inpatient consult to [facility]ICC team [facility]  Once        Provider:  (Not yet assigned)    Completed CALOS, MAGDY     Inpatient consult to Cardiothoracic Surgery  Once        Provider:  (Not yet assigned)    Completed CALOS, MAGDY     Inpatient consult to Midline team  Once        Provider:  (Not  yet assigned)    Completed MARGUERITE ROMAN     Inpatient consult to Electrophysiology  Once        Provider:  (Not yet assigned)    Completed ARLEY BANUELOS     Inpatient consult to Midline team  Once        Provider:  (Not yet assigned)    Completed DIANNA BRIONES JR     Inpatient consult to PICC team ([facility])  Once        Provider:  (Not yet assigned)    Completed DIANNA BRIONES JR     Inpatient consult to [facility] ([facility])  Once        Provider:  (Not yet assigned)    Completed LUC ABRAHAM     Inpatient consult to Infectious Diseases  Once        Provider:  (Not yet assigned)    Completed LUC ABRAHAM     Inpatient consult to Cardiology  Once        Provider:  (Not yet assigned)    Completed LUC LAGUNA Diagnostic Studies: Labs: All labs within the past 24 hours have been reviewed    Pending Diagnostic Studies:     Procedure Component Value Units Date/Time    APTT [[id number]] Collected: 10/13/22 0415    Order Status: Sent Lab Status: No result     Specimen: Blood     APTT [[id number]] Collected: 10/02/22 0516    Order Status: Sent Lab Status: In process Updated: 10/02/22 0516    Specimen: Blood     IR Tunneled Catheter Insert w/o Port [[id number]]     Order Status: Sent Lab Status: No result     Lactic acid, plasma [[id number]] Collected: 10/04/22 0937    Order Status: Sent Lab Status: In process Updated: 10/04/22 0940    Specimen: Blood, Venous         Final Active Diagnoses:    Diagnosis Date Noted POA    PRINCIPAL PROBLEM:  Acute on chronic combined systolic and diastolic heart failure [I50.43] 01/19/2021 Yes    Encounter for weaning from ventilator [Z99.11] 10/01/2022 Not Applicable    Vasogenic shock [R57.8] 09/28/2022 Yes    SSS (sick sinus syndrome) [I49.5] 09/28/2022 Yes    Ischemic cardiomyopathy [I25.5] 09/28/2022 Yes    Acute thrombosis of left brachial vein [I82.622] 09/22/2022 Unknown    Cellulitis of left forearm [L03.114] 09/18/2022  "No    Pancreatic lesion [K86.9] 09/17/2022 Yes    Bacteremia due to methicillin resistant Staphylococcus aureus [R78.81, B95.62] 09/16/2022 Yes    MRSA bacteremia [R78.81, B95.62] 09/16/2022 Yes    Hypoxia [R09.02] 06/04/2021 Yes    H/O ventricular tachycardia [Z86.79] 01/20/2021 Not Applicable     Chronic    Acute hypoxemic respiratory failure [J96.01] 11/07/2015 No    Coronary artery disease involving native coronary artery of native heart without angina pectoris [I25.10] 11/26/2012 Yes     Chronic      Problems Resolved During this Admission:      Discharged Condition: stable    Disposition: Home or Self Care    Follow Up:    Patient Instructions:      OXYGEN FOR HOME USE     Order Specific Question Answer Comments   Liter Flow 2    Duration Continuous    Qualifying Test Performed at: Rest    Oxygen saturation: 88    Portable mode: continuous    Route nasal cannula    Device: home concentrator with portable tanks    Length of need (in months): 99 mos    Patient condition with qualifying saturation CHF    Height: 5' 7" (1.702 m)    Weight: 89.4 kg (197 lb 1.5 oz)    Alternative treatment measures have been tried or considered and deemed clinically ineffective. Yes      Medications:  Reconciled Home Medications:      Medication List      START taking these medications    bumetanide 2 MG tablet  Commonly known as: BUMEX  Take 1.5 tablets (3 mg total) by mouth once daily.     potassium chloride SA 20 MEQ tablet  Commonly known as: K-DUR,KLOR-CON  Take 1 tablet (20 mEq total) by mouth once daily.     sodium chloride 0.9% SolP 50 mL with DAPTOmycin 500 mg SolR 943 mg  Inject 943 mg into the vein every hour.        CONTINUE taking these medications    allopurinoL 100 MG tablet  Commonly known as: ZYLOPRIM  Take 2 tablets (200 mg total) by mouth once daily.     amiodarone 200 MG Tab  Commonly known as: PACERONE  TAKE 1 AND 1/2 TABLETS(300 MG) BY MOUTH EVERY DAY     aspirin 81 MG EC tablet  Commonly known as: " ECOTRIN  Take 1 tablet (81 mg total) by mouth once daily.     atorvastatin 80 MG tablet  Commonly known as: LIPITOR  Take 1 tablet (80 mg total) by mouth once daily.     DOBUTamine 500 mg/250 mL (2,000 mcg/mL)  Commonly known as: DOBUTREX  2.5 mcg/kg/min  Patient is 95.7 kg     JARDIANCE 25 mg tablet  Generic drug: empagliflozin  Take 1 tablet (25 mg total) by mouth once daily.     ondansetron 4 MG Tbdl  Commonly known as: ZOFRAN-ODT  Dissolve 1 tablet (4 mg total) by mouth every 6 (six) hours as needed (nausea).        STOP taking these medications    ENTRESTO 49-51 mg per tablet  Generic drug: sacubitriL-valsartan     furosemide 40 MG tablet  Commonly known as: LASIX     HYDROcodone-acetaminophen  mg per tablet  Commonly known as: NORCO     polyethylene glycol 236-22.74-6.74 -5.86 gram suspension  Commonly known as: GoLYTELY     simethicone 80 MG chewable tablet  Commonly known as: MYLICON     spironolactone 25 MG tablet  Commonly known as: ALDACTONE            Venkatesh Love PA-C  Heart Transplant  [facility] - Card[facility]

## 2022-10-19 NOTE — SUBJECTIVE & OBJECTIVE
Interval History: Pt remains in the ICU. Epi is weaned off. No complaints this AM, just eager to get home. Patient's wishes are still to go home with HH with [facility] program. Not interested in hospice in any form due to bad experiences in the past.     Hemodynamics     SVO2: 59%  CVP: 5  CO: 7.4  CI: 3.6  SVR: 681    Weaned epi off and patient tolerating well. Plan is to go with HH tomorrow vs this evening.     Continuous Infusions:   sodium chloride 0.9% Stopped (09/28/22 1550)    sodium chloride 0.9% 5 mL/hr at 10/15/22 1051    DOBUTamine IV infusion (non-titrating) 2.5 mcg/kg/min (10/19/22 1200)    heparin (porcine) in D5W 19 Units/kg/hr (10/19/22 1200)     Scheduled Meds:   allopurinoL  200 mg Oral Daily    amiodarone  300 mg Oral Daily    aspirin  81 mg Oral Daily    atorvastatin  80 mg Oral QHS    DAPTOmycin (CUBICIN) IV (PEDS and ADULTS)  10 mg/kg Intravenous Q24H    famotidine  20 mg Oral Daily    furosemide (LASIX) injection  80 mg Intravenous TID    LIDOcaine  1 patch Transdermal Q24H    LIDOcaine HCl 2%  15 mL Oral Once    olopatadine  1 drop Both Eyes Daily    piperacillin-tazobactam (ZOSYN) IVPB  4.5 g Intravenous Q8H    polyethylene glycol  17 g Oral Daily    potassium chloride  40 mEq Oral Once    sucralfate  1 g Oral BID     PRN Meds:sodium chloride, acetaminophen, artificial tears, dextromethorphan-guaiFENesin  mg, heparin (PORCINE), heparin (PORCINE), HYDROcodone-acetaminophen, melatonin, methocarbamoL, ondansetron, senna-docusate 8.6-50 mg, sodium chloride 0.9%    Review of patient's allergies indicates:   Allergen Reactions    Iodine containing multivitamin Swelling     itching    Keflex [cephalexin] Swelling     Eyes.  Tolerated multiple doses of zosyn and 1 dose of augmentin in 2015 and 2016, respectively    Peaches [peach (prunus persica)] Swelling     eyes    Shellfish containing products Swelling    Fig tree Swelling     itching    Tuberculin spenser test ppd Rash     Objective:     Vital  Signs (Most Recent):  Temp: 99.4 °F (37.4 °C) (10/19/22 1100)  Pulse: 93 (10/19/22 1200)  Resp: (!) 33 (10/19/22 1200)  BP: (!) 98/55 (10/19/22 1200)  SpO2: 96 % (10/19/22 1200)   Vital Signs (24h Range):  Temp:  [98.4 °F (36.9 °C)-99.8 °F (37.7 °C)] 99.4 °F (37.4 °C)  Pulse:  [] 93  Resp:  [17-43] 33  SpO2:  [86 %-100 %] 96 %  BP: ()/(52-75) 98/55     No data found.  Body mass index is 31.35 kg/m².      Intake/Output Summary (Last 24 hours) at 10/19/2022 1347  Last data filed at 10/19/2022 1200  Gross per 24 hour   Intake 1158.45 ml   Output 825 ml   Net 333.45 ml           Physical Exam  Constitutional:       General: He is not in acute distress.     Appearance: Normal appearance. He is normal weight. He is not ill-appearing or toxic-appearing.   HENT:      Head: Normocephalic and atraumatic.      Nose: Nose normal. No congestion.      Mouth/Throat:      Mouth: Mucous membranes are moist.      Pharynx: No oropharyngeal exudate.   Eyes:      General:         Right eye: No discharge.         Left eye: No discharge.      Extraocular Movements: Extraocular movements intact.      Pupils: Pupils are equal, round, and reactive to light.   Cardiovascular:      Rate and Rhythm: Normal rate and regular rhythm.      Heart sounds: No murmur heard.    No friction rub. No gallop.   Pulmonary:      Effort: Pulmonary effort is normal. No respiratory distress.      Breath sounds: Normal breath sounds. No wheezing, rhonchi or rales.   Abdominal:      General: Abdomen is flat. Bowel sounds are normal. There is no distension.      Palpations: Abdomen is soft.      Tenderness: There is no abdominal tenderness.   Musculoskeletal:         General: No swelling. Normal range of motion.      Cervical back: Normal range of motion. No rigidity.      Right lower leg: Edema present.      Left lower leg: Edema present.   Skin:     General: Skin is warm and dry.      Findings: No lesion or rash.   Neurological:      General: No focal  deficit present.      Mental Status: He is alert and oriented to person, place, and time.      Cranial Nerves: No cranial nerve deficit.      Motor: No weakness.       Significant Labs:  CBC:  Recent Labs   Lab 10/17/22  0418 10/18/22  0311 10/19/22  0326   WBC 14.62* 15.34* 15.27*   RBC 3.39* 3.37* 3.34*   HGB 9.3* 9.2* 9.1*   HCT 29.0* 28.2* 28.4*    474* 442   MCV 86 84 85   MCH 27.4 27.3 27.2   MCHC 32.1 32.6 32.0       BNP:  No results for input(s): BNP in the last 168 hours.    Invalid input(s): BNPTRIAGELBLO  CMP:  Recent Labs   Lab 10/13/22  0925 10/14/22  0400 10/17/22  0418 10/18/22  0311 10/19/22  0326   GLU  --    < > 168* 171* 151*   CALCIUM  --    < > 8.4* 8.9 9.0   ALBUMIN 2.8*  --   --   --   --    PROT 6.7  --   --   --   --    NA  --    < > 131* 132* 132*   K  --    < > 3.1* 4.0 3.6   CO2  --    < > 33* 31* 29   CL  --    < > 85* 85* 85*   BUN  --    < > 30* 31* 36*   CREATININE  --    < > 2.0* 2.3* 2.4*   ALKPHOS 86  --   --   --   --    ALT 19  --   --   --   --    AST 21  --   --   --   --    BILITOT 0.8  --   --   --   --     < > = values in this interval not displayed.        Coagulation:   Recent Labs   Lab 10/17/22  0418 10/18/22  0311 10/19/22  0326   APTT 53.6* 57.6* 55.9*       LDH:  No results for input(s): LDH in the last 72 hours.  Microbiology:  Microbiology Results (last 7 days)       Procedure Component Value Units Date/Time    Blood culture [[id number]] Collected: 10/16/22 1530    Order Status: Completed Specimen: Blood from Peripheral, Antecubital, Right Updated: 10/18/22 1812     Blood Culture, Routine No Growth to date      No Growth to date      No Growth to date    Blood culture [[id number]] Collected: 10/16/22 1531    Order Status: Completed Specimen: Blood from Peripheral, Hand, Right Updated: 10/18/22 1812     Blood Culture, Routine No Growth to date      No Growth to date      No Growth to date    Respiratory Infection Panel (PCR), Nasopharyngeal [[id number]]  Collected: 10/16/22 1254    Order Status: Completed Specimen: Nasopharyngeal Swab Updated: 10/16/22 1555     Respiratory Infection Panel Source NP Swab     Adenovirus Not Detected     Coronavirus 229E, Common Cold Virus Not Detected     Coronavirus HKU1, Common Cold Virus Not Detected     Coronavirus NL63, Common Cold Virus Not Detected     Coronavirus OC43, Common Cold Virus Not Detected     Comment: The Coronavirus strains detected in this test cause the common cold.  These strains are not the COVID-19 (novel Coronavirus)strain   associated with the respiratory disease outbreak.          SARS-CoV2 (COVID-19) Qualitative PCR Not Detected     Human Metapneumovirus Not Detected     Human Rhinovirus/Enterovirus Not Detected     Influenza A (subtypes H1, H1-2009,H3) Not Detected     Influenza B Not Detected     Parainfluenza Virus 1 Not Detected     Parainfluenza Virus 2 Not Detected     Parainfluenza Virus 3 Not Detected     Parainfluenza Virus 4 Not Detected     Respiratory Syncytial Virus Not Detected     Bordetella Parapertussis (PU7174) Not Detected     Bordetella pertussis (ptxP) Not Detected     Chlamydia pneumoniae Not Detected     Mycoplasma pneumoniae Not Detected    Narrative:      For all other respiratory sources, order [id number] -  Respiratory Viral Panel by PCR    Influenza A & B by Molecular [[id number]] Collected: 10/16/22 1300    Order Status: Completed Specimen: Nasopharyngeal Swab Updated: 10/16/22 1441     Influenza A, Molecular Negative     Influenza B, Molecular Negative     Flu A & B Source Nasal swab    Culture, Respiratory with Gram Stain [[id number]]     Order Status: No result Specimen: Respiratory             BMP:   Recent Labs   Lab 10/19/22  0326   *   *   K 3.6   CL 85*   CO2 29   BUN 36*   CREATININE 2.4*   CALCIUM 9.0   MG 1.8       I have reviewed all pertinent labs within the past 24 hours.    Estimated Creatinine Clearance: 30.8 mL/min (A) (based on SCr of 2.4 mg/dL  (H)).    Diagnostic Results:  Reviewed

## 2022-10-19 NOTE — PROGRESS NOTES
DISCHARGE    SW to pt's room for discharge today.  Pt presents as AAO x4, calm, cooperative, and asking and answering questions appropriately, sister and gf at bedside.  Pt verbalizes understanding and agreement with plan for d/c to home today with  & IV ABX and HH. Pt reports his family will transport him home today.  Pt denies any d/c needs, and none identified by medical team. LCSW confirmed with [facility] (p: [phone], f: [phone]) that they will see Pt tomorrow. LCSW informed Marixa at [facility] ([phone]; fax [phone]) that Pt will dispo today with  and IV ABX.      Around 4pm, bedside RN states family may want to change address to [address] [facility] [place], [address], LCSW called [place] who states they don't think they cover that area but will ask the nursing director, Pt has [employer] insurance.  LCSW informed by bedside RN who states family and Pt will go to [facility] address. Pt's family states Pt has O2 tanks and concentrator but family cannot find 02 tubing. LCSW spoke to [facility], Daren De La Rosa, who states Pt has a Home O2 Concentrator since Dec 2021 and tanks and he is able to bring extra tubing to Pt's room around 8pm tonight. Daren states there is a depot within main campus where medical staff can get extra tubing and tanks. LCSW asked Daren to bring tubing and additional O2 items to Pt's room - LCSW informed bedside RN and [facility] medical team. LCSW provided bedside RN with [facility] and [facility] phone numbers.     Pt reports coping well at this time, and denies any needs or concerns to SW.  SW providing ongoing psychosocial and counseling support, education, resources, assistance, and discharge planning as indicated.  SW remains available.       LCSW confirmed with PT and family that he will be going to [address] [place]. [address].

## 2022-10-19 NOTE — ASSESSMENT & PLAN NOTE
72 year old male with history of PMH of CAD s/p MI, chronic combined systolic and diastolic heart failure on home dobutamine (EF15%), s/p CRT-D HTN, CKD was admitted to [facility] for advanced options workup.  He had MRSA bacteremia on cultures (cleared 9/18) no evidence of endocarditis, cardiac device and prior PICC line (suspected source) were removed (no growth on cultures), port placed with IR 10/14 and since then he has remained admitted in the ICU, on inotropic support, afebrile, with recent leukocytosis since 10/15, ID consulted in light of this.  Imaging with some evidence of patchy infiltrates b/l, concern for aspiration pneumonia. Seen by ID prior and on OPAT for MRSA bacteremia with daptomycin until 11/9/22. Blood cultures have been cleared since 9/18. Patient to be going home with HH on IV inotrope and antibiotics.     Plan    Treat Pneumonia with augmentin (amoxicillin and clavulanate) 875/125 mg PO BID for 5 days total.

## 2022-10-19 NOTE — SUBJECTIVE & OBJECTIVE
Past Medical History:   Diagnosis Date    Acute hypoxemic respiratory failure 11/7/2015    Acute idiopathic gout of left knee 12/2/2019    Acute idiopathic gout of right elbow 9/23/2021    AICD (automatic cardioverter/defibrillator) present 12/13/2015      Anticoagulant long-term use     Bronchopneumonia 12/20/2021    CHF (congestive heart failure)     Chronic anticoagulation 5/5/2016    Chronic combined systolic and diastolic heart failure 11/26/2012    EF 10-20% on ECHO 2013    Chronic gout 12/2/2019    Clotting disorder     Coronary artery disease involving native coronary artery of native heart without angina pectoris 11/26/2012    Cath 10- Stents patent non-obstructive disease Cath 11-12015 non-obstructive disease     COVID-19 08/2021    Had antibody infusion    Diverticulosis of colon     DVT (deep venous thrombosis), unspecified laterality 11/12/2015    Dysphonia 1/28/2018    Essential hypertension 11/15/2015    Fine motor impairment 2/2/2021    Hyperlipidemia     Hypertensive heart disease with heart failure 5/5/2016    MI (myocardial infarction) 2009    x's 5    Nicotine abuse     Obesity 11/26/2012    Olecranon bursitis of right elbow 9/19/2021    Pulmonary embolism 2011    Renal disorder     LAVERNE    Right carpal tunnel syndrome 4/6/2018    Stented coronary artery 11/26/2012    LAD stent placed 10/17/2007     Trigger thumb of right hand 4/6/2018       Past Surgical History:   Procedure Laterality Date    APPENDECTOMY      BACK SURGERY      CARDIAC DEFIBRILLATOR PLACEMENT      CARDIAC SURGERY  2007    stent    CARPAL TUNNEL RELEASE Right 04/2018    COLONOSCOPY N/A 8/8/2022    Procedure: COLONOSCOPY;  Surgeon: Sascha Keenan MD;  Location: [facility] ([address] [place] [address]);  Service: Endoscopy;  Laterality: N/A;  EF-15%  pre heart    AICD-St [facility]ri       COVID test [facility] 8/5-inst mail-am prep-clears 4 hrs prior-tb    ENDOSCOPIC ULTRASOUND OF UPPER GASTROINTESTINAL TRACT N/A 10/4/2022    Procedure:  ULTRASOUND, UPPER GI TRACT, ENDOSCOPIC;  Surgeon: Charlie Smith MD;  Location: [facility] ENDO (2ND FLR);  Service: Endoscopy;  Laterality: N/A;    INSERTION OF BIVENTRICULAR IMPLANTABLE CARDIOVERTER-DEFIBRILLATOR (ICD) N/A 5/3/2019    Procedure: INSERTION, ICD, BIVENTRICULAR;  Surgeon: Teofilo Pal MD;  Location: [facility] EP LAB;  Service: Cardiology;  Laterality: N/A;  ICH CM,  ICD UPGD BI-V,  SJM, MAC, FAS, 3PREP (dual ICD INSITU)    r knee scope      REVISION OF SKIN POCKET FOR CARDIOVERTER-DEFIBRILLATOR Left 5/3/2019    Procedure: Revision, Skin Pocket, For Cardioverter-Defibrillator;  Surgeon: Teofilo Pal MD;  Location: [facility] EP LAB;  Service: Cardiology;  Laterality: Left;    RIGHT HEART CATHETERIZATION Right 6/14/2021    Procedure: INSERTION, CATHETER, RIGHT HEART;  Surgeon: Lizz Nieto MD;  Location: [facility] CATH LAB;  Service: Cardiology;  Laterality: Right;    RIGHT HEART CATHETERIZATION Right 6/17/2022    Procedure: INSERTION, CATHETER, RIGHT HEART;  Surgeon: Migue Henry Jr., MD;  Location: [facility] CATH LAB;  Service: Cardiology;  Laterality: Right;    SPINE SURGERY      TONSILLECTOMY      TRIGGER FINGER RELEASE Right 04/2018    thumb       Review of patient's allergies indicates:   Allergen Reactions    Iodine containing multivitamin Swelling     itching    Keflex [cephalexin] Swelling     Eyes.  Tolerated multiple doses of zosyn and 1 dose of augmentin in 2015 and 2016, respectively    Peaches [peach (prunus persica)] Swelling     eyes    Shellfish containing products Swelling    Fig tree Swelling     itching    Tuberculin spenser test ppd Rash       Medications:  Medications Prior to Admission   Medication Sig    allopurinoL (ZYLOPRIM) 100 MG tablet Take 2 tablets (200 mg total) by mouth once daily.    amiodarone (PACERONE) 200 MG Tab TAKE 1 AND 1/2 TABLETS(300 MG) BY MOUTH EVERY DAY    aspirin (ECOTRIN) 81 MG EC tablet Take 1 tablet (81 mg total) by mouth once daily.    atorvastatin  (LIPITOR) 80 MG tablet Take 1 tablet (80 mg total) by mouth once daily.    DOBUTamine (DOBUTREX) 500 mg/250 mL (2,000 mcg/mL) 2.5 mcg/kg/min  Patient is 95.7 kg    JARDIANCE 25 mg tablet Take 1 tablet (25 mg total) by mouth once daily.    ondansetron (ZOFRAN-ODT) 4 MG TbDL Dissolve 1 tablet (4 mg total) by mouth every 6 (six) hours as needed (nausea).    [DISCONTINUED] furosemide (LASIX) 40 MG tablet Take 1 tablet (40 mg total) by mouth daily as needed (Weight gain of 3 lbs in one day or 5 lbs in one week).    [DISCONTINUED] HYDROcodone-acetaminophen (NORCO)  mg per tablet Take 1 tablet by mouth every 6 (six) hours.    [DISCONTINUED] polyethylene glycol (GOLYTELY) 236-22.74-6.74 -5.86 gram suspension SMARTSIG:Milliliter(s) By Mouth    [DISCONTINUED] sacubitriL-valsartan (ENTRESTO) 49-51 mg per tablet Take 1 tablet by mouth 2 (two) times daily.    [DISCONTINUED] simethicone (MYLICON) 80 MG chewable tablet Take 1 tablet (80 mg total) by mouth every 6 (six) hours as needed for Flatulence.    [DISCONTINUED] spironolactone (ALDACTONE) 25 MG tablet Take 1 tablet (25 mg total) by mouth once daily.     Antibiotics (From admission, onward)      Start     Stop Route Frequency Ordered    10/16/22 1330  piperacillin-tazobactam 4.5 g in sodium chloride 0.9% 100 mL IVPB (ready to mix system)         -- IV Every 8 hours (non-standard times) 10/16/22 1245    09/30/22 0900  DAPTOmycin (CUBICIN) 945 mg in sodium chloride 0.9% 50 mL IVPB         -- IV Every 24 hours (non-standard times) 09/29/22 1516          Antifungals (From admission, onward)      None          Antivirals (From admission, onward)      None             Immunization History   Administered Date(s) Administered    Hepatitis A, Adult 09/20/2005    Influenza 11/15/2002    Influenza (FLUAD) - Quadrivalent - Adjuvanted - PF *Preferred* (65+) 12/15/2021    Influenza - High Dose - PF (65 years and older) 10/12/2017, 12/02/2019    Influenza Split 09/20/2005     Pneumococcal Conjugate - 13 Valent 2017    Pneumococcal Polysaccharide - 23 Valent 2014, 2019    Td (Adult), Unspecified Formulation 2005    Tdap 2016    Zoster 10/20/2017       Family History       Problem Relation (Age of Onset)    Cancer Mother, Paternal Grandmother    Colon polyps Father    Diabetes Mother    Heart disease Mother, Father    No Known Problems Sister, Daughter, Son, Sister, Son    Stroke Father          Social History     Socioeconomic History    Marital status:     Number of children: 2   Occupational History    Occupation: [employer]     Comment: unemployed   Tobacco Use    Smoking status: Former     Packs/day: 1.00     Years: 45.00     Pack years: 45.00     Types: Cigarettes     Quit date: 2005     Years since quittin.8    Smokeless tobacco: Never    Tobacco comments:     1-1.5 ppd every day.   Substance and Sexual Activity    Alcohol use: No    Drug use: No    Sexual activity: Not Currently     Review of Systems   Constitutional:  Positive for fatigue. Negative for fever.   HENT:  Negative for sore throat.    Eyes:  Negative for visual disturbance.   Respiratory:  Positive for cough. Negative for shortness of breath.    Cardiovascular:  Negative for chest pain and leg swelling.   Gastrointestinal:  Positive for abdominal distention. Negative for abdominal pain, diarrhea and vomiting.   Genitourinary:  Negative for dysuria and hematuria.   Musculoskeletal:  Negative for arthralgias.   Neurological:  Negative for weakness.   Psychiatric/Behavioral:  Negative for confusion.    Objective:     Vital Signs (Most Recent):  Temp: 99.4 °F (37.4 °C) (10/19/22 1100)  Pulse: 102 (10/19/22 1400)  Resp: (!) 43 (10/19/22 1400)  BP: (!) 90/54 (10/19/22 1400)  SpO2: (!) 93 % (10/19/22 1400)   Vital Signs (24h Range):  Temp:  [98.4 °F (36.9 °C)-99.8 °F (37.7 °C)] 99.4 °F (37.4 °C)  Pulse:  [] 102  Resp:  [17-45] 43  SpO2:  [89 %-100 %] 93 %  BP:  ()/(52-75) 90/54     Weight: 90.8 kg (200 lb 2.8 oz)  Body mass index is 31.35 kg/m².    Estimated Creatinine Clearance: 30.8 mL/min (A) (based on SCr of 2.4 mg/dL (H)).    Physical Exam  Constitutional:       Appearance: He is ill-appearing.   HENT:      Head: Normocephalic and atraumatic.      Nose: Nose normal.      Mouth/Throat:      Mouth: Mucous membranes are moist.      Pharynx: Oropharynx is clear.   Eyes:      Extraocular Movements: Extraocular movements intact.      Conjunctiva/sclera: Conjunctivae normal.      Pupils: Pupils are equal, round, and reactive to light.   Cardiovascular:      Rate and Rhythm: Regular rhythm. Tachycardia present.      Pulses: Normal pulses.      Heart sounds: Normal heart sounds.   Pulmonary:      Effort: Pulmonary effort is normal.      Breath sounds: Normal breath sounds.      Comments: Nasal canula oxygen support  Abdominal:      General: Bowel sounds are normal. There is distension.      Palpations: Abdomen is soft.      Tenderness: There is no abdominal tenderness. There is no guarding or rebound.   Musculoskeletal:         General: No deformity. Normal range of motion.      Cervical back: Normal range of motion and neck supple.   Skin:     General: Skin is warm and dry.      Coloration: Skin is pale. Skin is not jaundiced.      Findings: No rash.   Neurological:      General: No focal deficit present.      Mental Status: He is alert and oriented to person, place, and time. Mental status is at baseline.       Significant Labs: Blood Culture:   Recent Labs   Lab 09/18/22  1117 09/30/22  0241 09/30/22  0243 10/16/22  1530 10/16/22  1531   LABBLOO No growth after 5 days. No growth after 5 days. No growth after 5 days. No Growth to date  No Growth to date  No Growth to date No Growth to date  No Growth to date  No Growth to date     BMP:   Recent Labs   Lab 10/19/22  0326   *   *   K 3.6   CL 85*   CO2 29   BUN 36*   CREATININE 2.4*   CALCIUM 9.0   MG  1.8     CBC:   Recent Labs   Lab 10/18/22  0311 10/19/22  0326   WBC 15.34* 15.27*   HGB 9.2* 9.1*   HCT 28.2* 28.4*   * 442     CMP:   Recent Labs   Lab 10/18/22  0311 10/19/22  0326   * 132*   K 4.0 3.6   CL 85* 85*   CO2 31* 29   * 151*   BUN 31* 36*   CREATININE 2.3* 2.4*   CALCIUM 8.9 9.0   ANIONGAP 16 18*     Microbiology Results (last 7 days)       Procedure Component Value Units Date/Time    Blood culture [[id number]] Collected: 10/16/22 1530    Order Status: Completed Specimen: Blood from Peripheral, Antecubital, Right Updated: 10/18/22 1812     Blood Culture, Routine No Growth to date      No Growth to date      No Growth to date    Blood culture [[id number]] Collected: 10/16/22 1531    Order Status: Completed Specimen: Blood from Peripheral, Hand, Right Updated: 10/18/22 1812     Blood Culture, Routine No Growth to date      No Growth to date      No Growth to date    Respiratory Infection Panel (PCR), Nasopharyngeal [[id number]] Collected: 10/16/22 1254    Order Status: Completed Specimen: Nasopharyngeal Swab Updated: 10/16/22 1555     Respiratory Infection Panel Source NP Swab     Adenovirus Not Detected     Coronavirus 229E, Common Cold Virus Not Detected     Coronavirus HKU1, Common Cold Virus Not Detected     Coronavirus NL63, Common Cold Virus Not Detected     Coronavirus OC43, Common Cold Virus Not Detected     Comment: The Coronavirus strains detected in this test cause the common cold.  These strains are not the COVID-19 (novel Coronavirus)strain   associated with the respiratory disease outbreak.          SARS-CoV2 (COVID-19) Qualitative PCR Not Detected     Human Metapneumovirus Not Detected     Human Rhinovirus/Enterovirus Not Detected     Influenza A (subtypes H1, H1-2009,H3) Not Detected     Influenza B Not Detected     Parainfluenza Virus 1 Not Detected     Parainfluenza Virus 2 Not Detected     Parainfluenza Virus 3 Not Detected     Parainfluenza Virus 4 Not Detected      Respiratory Syncytial Virus Not Detected     Bordetella Parapertussis (JP9736) Not Detected     Bordetella pertussis (ptxP) Not Detected     Chlamydia pneumoniae Not Detected     Mycoplasma pneumoniae Not Detected    Narrative:      For all other respiratory sources, order [id number] -  Respiratory Viral Panel by PCR    Influenza A & B by Molecular [[id number]] Collected: 10/16/22 1300    Order Status: Completed Specimen: Nasopharyngeal Swab Updated: 10/16/22 1441     Influenza A, Molecular Negative     Influenza B, Molecular Negative     Flu A & B Source Nasal swab    Culture, Respiratory with Gram Stain [[id number]]     Order Status: No result Specimen: Respiratory           Pathology Results  (Last 10 years)                 08/08/22 1037  Specimen to Pathology, Surgery Gastrointestinal tract Final result    Narrative:  Pre-op Diagnosis: Chronic combined systolic and diastolic   congestive heart failure [I50.42]   History of colon polyps [Z86.010]   Procedure(s):   COLONOSCOPY   Number of specimens:   Name of specimens:   Jar 1 rectal polyp   Which provider would you like to cc?->PARENT, SUGAR PORTER   Release to patient->Immediate   Specimen total (fresh, frozen, permanent):->1       12/19/20 1246  Specimen to Pathology, Surgery Orthopedics Final result    Narrative:  Pre-op Diagnosis: Cellulitis of left hand [L03.114]   Procedure(s):   INCISION AND DRAINAGE, UPPER EXTREMITY, left hand, hand   table, cysto tubing, 3L saline bag   Number of specimens: 1   Name of specimens: 1. Left index MCP joint -Permanent   Specimen total (fresh, frozen, permanent):->1             All pertinent labs within the past 24 hours have been reviewed.  Recent Lab Results         10/19/22  0440   10/19/22  0326   10/18/22  2001        Allens Test N/A     N/A       Anion Gap   18         aPTT   55.9  Comment: aPTT therapeutic range = 39-69 seconds         Baso #   0.02         Basophil %   0.1         Site Other     Other       BUN   36          Calcium   9.0         Chloride   85         CO2   29         Creatinine   2.4         DelSys Nasal Can     Nasal Can       Differential Method   Automated         eGFR   28.3         Eos #   0.2         Eosinophil %   1.0         Flow 3     4       Glucose   151         Gran # (ANC)   12.9         Gran %   84.3         Hematocrit   28.4         Hemoglobin   9.1         Immature Grans (Abs)   0.08  Comment: Mild elevation in immature granulocytes is non specific and   can be seen in a variety of conditions including stress response,   acute inflammation, trauma and pregnancy. Correlation with other   laboratory and clinical findings is essential.           Immature Granulocytes   0.5         Lymph #   1.0         Lymph %   6.8         Magnesium   1.8         MCH   27.2         MCHC   32.0         MCV   85         Mode SPONT     SPONT       Mono #   1.1         Mono %   7.3         MPV   9.9         nRBC   0         Platelets   442         POC BE 14     14       POC HCO3 37.2     37.9       POC PCO2 48.8     54.0       POC PH 7.491     7.454       POC PO2 29     28       POC SATURATED O2 59     55       POC TCO2 39     40       Potassium   3.6         RBC   3.34         RDW   16.2         Sample VENOUS     VENOUS       Sodium   132         Sp02 94     97       WBC   15.27                 Significant Imaging: I have reviewed all pertinent imaging results/findings within the past 24 hours.

## 2022-10-19 NOTE — HOSPITAL COURSE
Mr Thad got admitted 9/14 for ADHF and found to have MRSA bacteremia thought to be secondary to forearm abrasion. He was deferred at selection for workup of a pancreatic lesion noted and was unable to get MRI at the time with CRT-D and renal function precluding contrast CT. His w/u for bacteremia included 9/21 HARRY negative for vegetations/endocarditis. On 9/27 CRT generator and lead extraction, pocket cx => hypotensive post-procedure requiring Epi, ICU. 9/28 off Epi, transfer to [facility]. On 9/29 he had sudden hypoxemic respiratory failure requiring urgent intubation after sats dropped while laying flat for PICC line => CXR negative for PTX, tx ICU, panculture. Extubated on 9/30.   - For underlying MRSA bacteremia he'll be on daptomycin through 11/9.   He is unfortunately too sick (hypoxia unable to wean O2 lower than 3L despite adquate diuresis, severe debility, kidney dysfucntion) and too weak to tolerate a major cardiac surgery such as a LVAD implant or even ICD reimplantation. After GOC discussion with mathew and his family it was decided to discharge home with  with [facility] program who can transition to hospice when appropriate. Will d/c on 2.5  and daptomycin. Life[facility]est on discharge

## 2022-10-19 NOTE — PROGRESS NOTES
[facility] - [facility]  Heart Transplant  Progress Note    Patient Name: Audrey Oneil Jr.  MRN: [id number]  Admission Date: 9/14/2022  Hospital Length of Stay: 35 days  Attending Physician: Lizz Nieto MD  Primary Care Provider: Primary Doctor No  Principal Problem:Acute on chronic combined systolic and diastolic heart failure    Subjective:     Interval History: Pt remains in the ICU. Epi is weaned off. No complaints this AM, just eager to get home. Patient's wishes are still to go home with HH with [facility] program. Not interested in hospice in any form due to bad experiences in the past.     Hemodynamics     SVO2: 59%  CVP: 5  CO: 7.4  CI: 3.6  SVR: 681    Weaned epi off and patient tolerating well. Plan is to go with HH tomorrow vs this evening.     Continuous Infusions:   sodium chloride 0.9% Stopped (09/28/22 1550)    sodium chloride 0.9% 5 mL/hr at 10/15/22 1051    DOBUTamine IV infusion (non-titrating) 2.5 mcg/kg/min (10/19/22 1200)    heparin (porcine) in D5W 19 Units/kg/hr (10/19/22 1200)     Scheduled Meds:   allopurinoL  200 mg Oral Daily    amiodarone  300 mg Oral Daily    aspirin  81 mg Oral Daily    atorvastatin  80 mg Oral QHS    DAPTOmycin (CUBICIN) IV (PEDS and ADULTS)  10 mg/kg Intravenous Q24H    famotidine  20 mg Oral Daily    furosemide (LASIX) injection  80 mg Intravenous TID    LIDOcaine  1 patch Transdermal Q24H    LIDOcaine HCl 2%  15 mL Oral Once    olopatadine  1 drop Both Eyes Daily    piperacillin-tazobactam (ZOSYN) IVPB  4.5 g Intravenous Q8H    polyethylene glycol  17 g Oral Daily    potassium chloride  40 mEq Oral Once    sucralfate  1 g Oral BID     PRN Meds:sodium chloride, acetaminophen, artificial tears, dextromethorphan-guaiFENesin  mg, heparin (PORCINE), heparin (PORCINE), HYDROcodone-acetaminophen, melatonin, methocarbamoL, ondansetron, senna-docusate 8.6-50 mg, sodium chloride 0.9%    Review of patient's allergies indicates:   Allergen Reactions    Iodine  containing multivitamin Swelling     itching    Keflex [cephalexin] Swelling     Eyes.  Tolerated multiple doses of zosyn and 1 dose of augmentin in 2015 and 2016, respectively    Peaches [peach (prunus persica)] Swelling     eyes    Shellfish containing products Swelling    Fig tree Swelling     itching    Tuberculin spenser test ppd Rash     Objective:     Vital Signs (Most Recent):  Temp: 99.4 °F (37.4 °C) (10/19/22 1100)  Pulse: 93 (10/19/22 1200)  Resp: (!) 33 (10/19/22 1200)  BP: (!) 98/55 (10/19/22 1200)  SpO2: 96 % (10/19/22 1200)   Vital Signs (24h Range):  Temp:  [98.4 °F (36.9 °C)-99.8 °F (37.7 °C)] 99.4 °F (37.4 °C)  Pulse:  [] 93  Resp:  [17-43] 33  SpO2:  [86 %-100 %] 96 %  BP: ()/(52-75) 98/55     No data found.  Body mass index is 31.35 kg/m².      Intake/Output Summary (Last 24 hours) at 10/19/2022 1347  Last data filed at 10/19/2022 1200  Gross per 24 hour   Intake 1158.45 ml   Output 825 ml   Net 333.45 ml           Physical Exam  Constitutional:       General: He is not in acute distress.     Appearance: Normal appearance. He is normal weight. He is not ill-appearing or toxic-appearing.   HENT:      Head: Normocephalic and atraumatic.      Nose: Nose normal. No congestion.      Mouth/Throat:      Mouth: Mucous membranes are moist.      Pharynx: No oropharyngeal exudate.   Eyes:      General:         Right eye: No discharge.         Left eye: No discharge.      Extraocular Movements: Extraocular movements intact.      Pupils: Pupils are equal, round, and reactive to light.   Cardiovascular:      Rate and Rhythm: Normal rate and regular rhythm.      Heart sounds: No murmur heard.    No friction rub. No gallop.   Pulmonary:      Effort: Pulmonary effort is normal. No respiratory distress.      Breath sounds: Normal breath sounds. No wheezing, rhonchi or rales.   Abdominal:      General: Abdomen is flat. Bowel sounds are normal. There is no distension.      Palpations: Abdomen is soft.       Tenderness: There is no abdominal tenderness.   Musculoskeletal:         General: No swelling. Normal range of motion.      Cervical back: Normal range of motion. No rigidity.      Right lower leg: Edema present.      Left lower leg: Edema present.   Skin:     General: Skin is warm and dry.      Findings: No lesion or rash.   Neurological:      General: No focal deficit present.      Mental Status: He is alert and oriented to person, place, and time.      Cranial Nerves: No cranial nerve deficit.      Motor: No weakness.       Significant Labs:  CBC:  Recent Labs   Lab 10/17/22  0418 10/18/22  0311 10/19/22  0326   WBC 14.62* 15.34* 15.27*   RBC 3.39* 3.37* 3.34*   HGB 9.3* 9.2* 9.1*   HCT 29.0* 28.2* 28.4*    474* 442   MCV 86 84 85   MCH 27.4 27.3 27.2   MCHC 32.1 32.6 32.0       BNP:  No results for input(s): BNP in the last 168 hours.    Invalid input(s): BNPTRIAGELBLO  CMP:  Recent Labs   Lab 10/13/22  0925 10/14/22  0400 10/17/22  0418 10/18/22  0311 10/19/22  0326   GLU  --    < > 168* 171* 151*   CALCIUM  --    < > 8.4* 8.9 9.0   ALBUMIN 2.8*  --   --   --   --    PROT 6.7  --   --   --   --    NA  --    < > 131* 132* 132*   K  --    < > 3.1* 4.0 3.6   CO2  --    < > 33* 31* 29   CL  --    < > 85* 85* 85*   BUN  --    < > 30* 31* 36*   CREATININE  --    < > 2.0* 2.3* 2.4*   ALKPHOS 86  --   --   --   --    ALT 19  --   --   --   --    AST 21  --   --   --   --    BILITOT 0.8  --   --   --   --     < > = values in this interval not displayed.        Coagulation:   Recent Labs   Lab 10/17/22  0418 10/18/22  0311 10/19/22  0326   APTT 53.6* 57.6* 55.9*       LDH:  No results for input(s): LDH in the last 72 hours.  Microbiology:  Microbiology Results (last 7 days)       Procedure Component Value Units Date/Time    Blood culture [[id number]] Collected: 10/16/22 1530    Order Status: Completed Specimen: Blood from Peripheral, Antecubital, Right Updated: 10/18/22 1812     Blood Culture, Routine No Growth to  date      No Growth to date      No Growth to date    Blood culture [[id number]] Collected: 10/16/22 1531    Order Status: Completed Specimen: Blood from Peripheral, Hand, Right Updated: 10/18/22 1812     Blood Culture, Routine No Growth to date      No Growth to date      No Growth to date    Respiratory Infection Panel (PCR), Nasopharyngeal [[id number]] Collected: 10/16/22 1254    Order Status: Completed Specimen: Nasopharyngeal Swab Updated: 10/16/22 1555     Respiratory Infection Panel Source NP Swab     Adenovirus Not Detected     Coronavirus 229E, Common Cold Virus Not Detected     Coronavirus HKU1, Common Cold Virus Not Detected     Coronavirus NL63, Common Cold Virus Not Detected     Coronavirus OC43, Common Cold Virus Not Detected     Comment: The Coronavirus strains detected in this test cause the common cold.  These strains are not the COVID-19 (novel Coronavirus)strain   associated with the respiratory disease outbreak.          SARS-CoV2 (COVID-19) Qualitative PCR Not Detected     Human Metapneumovirus Not Detected     Human Rhinovirus/Enterovirus Not Detected     Influenza A (subtypes H1, H1-2009,H3) Not Detected     Influenza B Not Detected     Parainfluenza Virus 1 Not Detected     Parainfluenza Virus 2 Not Detected     Parainfluenza Virus 3 Not Detected     Parainfluenza Virus 4 Not Detected     Respiratory Syncytial Virus Not Detected     Bordetella Parapertussis (QJ9608) Not Detected     Bordetella pertussis (ptxP) Not Detected     Chlamydia pneumoniae Not Detected     Mycoplasma pneumoniae Not Detected    Narrative:      For all other respiratory sources, order [id number] -  Respiratory Viral Panel by PCR    Influenza A & B by Molecular [[id number]] Collected: 10/16/22 1300    Order Status: Completed Specimen: Nasopharyngeal Swab Updated: 10/16/22 1441     Influenza A, Molecular Negative     Influenza B, Molecular Negative     Flu A & B Source Nasal swab    Culture, Respiratory with Gram Stain  "[[id number]]     Order Status: No result Specimen: Respiratory             BMP:   Recent Labs   Lab 10/19/22  0326   *   *   K 3.6   CL 85*   CO2 29   BUN 36*   CREATININE 2.4*   CALCIUM 9.0   MG 1.8       I have reviewed all pertinent labs within the past 24 hours.    Estimated Creatinine Clearance: 30.8 mL/min (A) (based on SCr of 2.4 mg/dL (H)).    Diagnostic Results:  Reviewed     Assessment and Plan:     71 yo WM being worked up for LVAD since hospitalization January 2022 for recurrent VT (he thinks had MI) and cardiogenic shock at [facility]. He was  later seen in [facility] where he was treated for cardiogenic shock and sent home on .  He remains on  and is pursuing evaluation for LVAD.     He has had a couple of observation admissions where he was told to be dry at visit June 2022. At July 2022 visit he was noted that he could not tolerate higher doses of meds due to symptomatic hypotension.  He still notes occasional dizzy spells when he 1st stands but there are not too bad at this time.  Uses Lasix only as needed targeting his home weight 218-220.      His case was discussed at selection committee and he was deferred pending evaluation of pancreatic mass.  They wish to proceed with MRI but not sure with his ICD if this will be possible. Home weight this AM was 216#.    Interval History:  Today he comes in due to abdominal discomfort. He ate packaged spaghetti last night and has since been with abdominal pain and dry heaving. No vomiting or diarrhea. He intermittently uses home oxygen 4L and felt the need to start using last night. He states he feels like " a bug got him". He reports noticing that his HR was significantly elevated over night about 100-115bpm. He is compliant with his lasix. No lower extremity edema. Felt warm this morning. His abdomen appears distended however he reports it normally gets firm/tight when volume overloaded, which is not the case at this time.     Bedside echo shows EF " approx 25-30%. IVC measuring approx 1.4cm and collapsible. No JVD. CXR shows bilateral pulmonary congestion.    Echo 9/14/22  Moderate left atrial enlargement.  The left ventricle is severely enlarged with eccentric hypertrophy and severely decreased systolic function.  There is severe left ventricular global hypokinesis with anterospetal and apical scar. The estimated ejection fraction is 10-15%.  Left ventricular diastolic dysfunction.  Normal right ventricular size with normal right ventricular systolic function.  Mild mitral regurgitation.  Normal central venous pressure (3 mmHg).  There is no significant tricuspid regurgitation and therefore the pulmonary artery systolic pressure cannot be reported.         * Acute on chronic combined systolic and diastolic heart failure  - Dobutamine 2.5, Epi .02. Continue weaning epi down.   - Holding: Spironolactone 25 mg, daily, Jardiance and was previously on Entresto 49-51 mg, BID  - Transition from lasix gtt to IV boluses. CVP single digits.   - Daily weights, Strict I/Os  - Monitor electrolytes and Maintain Mg >2 and K >4  -Telemetry monitoring    Acute thrombosis of left brachial vein  Started on Heparin. IV over left arm replaced with rt arm iv. Will plan to get a PICC over the rt arm and monitor CVP and Mixed venous oxygen.     Pancreatic lesion  - Gastroenterology consult and recommendations appreciated  - Unable to obtain MRI for further evaluation given CRT-D  - Will hold off on obtaining CT with pancreatic protocol at this time in view of active infection requiring vancomycin and risk of contrast-induced nephrotoxicity in this patient    Bacteremia due to methicillin resistant Staphylococcus aureus  - Monitor WBC count and fever curve, pan-culture if patient spikes  - ID consult and recommendations are appreciated  - Vancomycin 1,250, daily; Monitor level and renal panel  -patient has persistent positive blood cultures.  Cultures from 18 no growth so far.  His  currently on vancomycin.  Id on board.  - consult EP for ICD removal.  - WCC uptrending despite zosyn and dapto, will reach out to ID, but cultures from 2 days ago NGTD.     H/O ventricular tachycardia  - Amiodarone 300 mg, daily  - Monitor LFTs    Coronary artery disease involving native coronary artery of native heart without angina pectoris  - Asprin 81 mg, daily  - Atorvastatin 80 mg, nightly        JOÃO HesterC  Heart Transplant  [facility]

## 2022-10-19 NOTE — CONSULTS
[facility] - Cardiac Intensive Care  Infectious Disease  Consult Note    Patient Name: Audrey Oneil Jr.  MRN: [id number]  Admission Date: 9/14/2022  Hospital Length of Stay: 35 days  Attending Physician: Lizz Nieto MD  Primary Care Provider: Primary Doctor No     Isolation Status: No active isolations    Patient information was obtained from patient and ER records.      Inpatient consult to Infectious Diseases  Consult performed by: Keven Yin MD  Consult ordered by: Venkatesh Love PA-C        Assessment/Plan:     MRSA bacteremia  MRSA bacteremia - continue Daptomycin IV - EOT 11/9/2-022, see prior OPAT note for details. He expressed he is willing to see us in the clinic for follow up to ensure completion of OPAT therapy. Monitor weekly CK.                   Acute hypoxemic respiratory failure  72 year old male with history of PMH of CAD s/p MI, chronic combined systolic and diastolic heart failure on home dobutamine (EF15%), s/p CRT-D HTN, CKD was admitted to [facility] for advanced options workup.  He had MRSA bacteremia on cultures (cleared 9/18) no evidence of endocarditis, cardiac device and prior PICC line (suspected source) were removed (no growth on cultures), port placed with IR 10/14 and since then he has remained admitted in the ICU, on inotropic support, afebrile, with recent leukocytosis since 10/15, ID consulted in light of this.  Imaging with some evidence of patchy infiltrates b/l, concern for aspiration pneumonia. Seen by ID prior and on OPAT for MRSA bacteremia with daptomycin until 11/9/22. Blood cultures have been cleared since 9/18. Patient to be going home with HH on IV inotrope and antibiotics.     Plan    Treat Pneumonia with augmentin (amoxicillin and clavulanate) 875/125 mg PO BID for 5 days total.           Thank you for your consult. I will sign off. Please contact us if you have any additional questions.    Keven Yin MD  Infectious Disease  [facility] - Cardiac Intensive  Care    Subjective:     Principal Problem: Acute on chronic combined systolic and diastolic heart failure    HPI: 72 year old male with history of PMH of CAD s/p MI, chronic combined systolic and diastolic heart failure on home dobutamine (EF15%), s/p CRT-D HTN, CKD, under consideration for DT-LVAD, recurrent V tach and cardiogenic shock (1/2022), [facility] advanced options workup. He was admitted with SOB, fever up to 101.2 F, tachycardia, found to have MRSA on blodo cultures 9/14, 9/15, 9/16 and finally cleared on 9/18.   TTE done 9/14 with no vegetations or abscesses, 10-15% EF; Source of infection was unclear but thought to be from CVC catheter vs eschar lesions on skin.     CT from 9/14 showed multiple stable indeterminate pancreatic hypoattenuating lesions. Sigmoid colonic diverticulosis without diverticulitis or other bowel inflammatory process. Cholelithiasis. Cardiomegaly and trace pericardial fluid. ID was resconsulted on 9/30 for sepsis, patient went ot ICU for worsening hypoxia, intubated, was on pip/tazo and daptomycin. CXR with bilateral infiltrates, concerns for aspiration. Patient back to nasal canula O2 2 days later on 10/1. ID recs were for OPAT plan with Daptomycin until 11/9/22.     PICC line removed 9/22; Cardiac device removal 9/27: laser lead extraction with complete system removal. He had a runneled IR port catheter placed with IR on 10/14    Micro:   Deep pocket, shallow pocket, RA, RV, LV lead tip cultures all show no growth (collected 9/27/22)  Bcx 9/30 NGTD  Resp cx 10/1 normal jadiel, no S aureus or pseudomonas seen  RIP 10/16 negative  Bcx 10/16 NGTD    CXR on 10/13 with patchy increased interstitial and parenchymal attenuation bilaterally CXR 10/17 with Ill-defined patchy airspace consolidation bilaterally with slight improvement     He remains afebrile, with leukocytosis that started up trending on 10/15, and has been stable at 15. Has been on daptomycin (EOT 11/9/22),  previously on vancomycin until 9/30; daptomycin started 10/1; pip/tazo (9/30- 10/4) and then added since 10/16, azithromycin also added 10/16 x 3 days.He denies worsening SOB, but has some congestive cough, no sputum production noted, denies diarrhea (had some yesterday that resolved), dysuria, chest pain, fevers, rigors, abdominal pain.       ID consulted for Being covered for MRSA bacteremia with dapto. Now uptrending [facility]                Past Medical History:   Diagnosis Date    Acute hypoxemic respiratory failure 11/7/2015    Acute idiopathic gout of left knee 12/2/2019    Acute idiopathic gout of right elbow 9/23/2021    AICD (automatic cardioverter/defibrillator) present 12/13/2015      Anticoagulant long-term use     Bronchopneumonia 12/20/2021    CHF (congestive heart failure)     Chronic anticoagulation 5/5/2016    Chronic combined systolic and diastolic heart failure 11/26/2012    EF 10-20% on ECHO 2013    Chronic gout 12/2/2019    Clotting disorder     Coronary artery disease involving native coronary artery of native heart without angina pectoris 11/26/2012    Cath 10- Stents patent non-obstructive disease Cath 11-12015 non-obstructive disease     COVID-19 08/2021    Had antibody infusion    Diverticulosis of colon     DVT (deep venous thrombosis), unspecified laterality 11/12/2015    Dysphonia 1/28/2018    Essential hypertension 11/15/2015    Fine motor impairment 2/2/2021    Hyperlipidemia     Hypertensive heart disease with heart failure 5/5/2016    MI (myocardial infarction) 2009    x's 5    Nicotine abuse     Obesity 11/26/2012    Olecranon bursitis of right elbow 9/19/2021    Pulmonary embolism 2011    Renal disorder     LAVERNE    Right carpal tunnel syndrome 4/6/2018    Stented coronary artery 11/26/2012    LAD stent placed 10/17/2007     Trigger thumb of right hand 4/6/2018       Past Surgical History:   Procedure Laterality Date    APPENDECTOMY       BACK SURGERY      CARDIAC DEFIBRILLATOR PLACEMENT      CARDIAC SURGERY  2007    stent    CARPAL TUNNEL RELEASE Right 04/2018    COLONOSCOPY N/A 8/8/2022    Procedure: COLONOSCOPY;  Surgeon: Sascha Keenan MD;  Location: [facility] ENDO (2ND FLR);  Service: Endoscopy;  Laterality: N/A;  EF-15%  pre heart    AICD-St Rodri       COVID test [facility] [place]/5-inst mail-am prep-clears 4 hrs prior-tb    ENDOSCOPIC ULTRASOUND OF UPPER GASTROINTESTINAL TRACT N/A 10/4/2022    Procedure: ULTRASOUND, UPPER GI TRACT, ENDOSCOPIC;  Surgeon: Charlie Smith MD;  Location: [facility] ENDO (2ND FLR);  Service: Endoscopy;  Laterality: N/A;    INSERTION OF BIVENTRICULAR IMPLANTABLE CARDIOVERTER-DEFIBRILLATOR (ICD) N/A 5/3/2019    Procedure: INSERTION, ICD, BIVENTRICULAR;  Surgeon: Teofilo Pal MD;  Location: [facility] EP LAB;  Service: Cardiology;  Laterality: N/A;  ICH CM,  ICD UPGD BI-V,  SJM, MAC, FAS, 3PREP (dual ICD INSITU)    r knee scope      REVISION OF SKIN POCKET FOR CARDIOVERTER-DEFIBRILLATOR Left 5/3/2019    Procedure: Revision, Skin Pocket, For Cardioverter-Defibrillator;  Surgeon: Teofilo Pal MD;  Location: [facility] EP LAB;  Service: Cardiology;  Laterality: Left;    RIGHT HEART CATHETERIZATION Right 6/14/2021    Procedure: INSERTION, CATHETER, RIGHT HEART;  Surgeon: Lizz Nieto MD;  Location: [facility] CATH LAB;  Service: Cardiology;  Laterality: Right;    RIGHT HEART CATHETERIZATION Right 6/17/2022    Procedure: INSERTION, CATHETER, RIGHT HEART;  Surgeon: Migue Henry Jr., MD;  Location: [facility] CATH LAB;  Service: Cardiology;  Laterality: Right;    SPINE SURGERY      TONSILLECTOMY      TRIGGER FINGER RELEASE Right 04/2018    thumb       Review of patient's allergies indicates:   Allergen Reactions    Iodine containing multivitamin Swelling     itching    Keflex [cephalexin] Swelling     Eyes.  Tolerated multiple doses of zosyn and 1 dose of augmentin in 2015 and 2016, respectively    Peaches [peach (prunus  persica)] Swelling     eyes    Shellfish containing products Swelling    Fig tree Swelling     itching    Tuberculin spenser test ppd Rash       Medications:  Medications Prior to Admission   Medication Sig    allopurinoL (ZYLOPRIM) 100 MG tablet Take 2 tablets (200 mg total) by mouth once daily.    amiodarone (PACERONE) 200 MG Tab TAKE 1 AND 1/2 TABLETS(300 MG) BY MOUTH EVERY DAY    aspirin (ECOTRIN) 81 MG EC tablet Take 1 tablet (81 mg total) by mouth once daily.    atorvastatin (LIPITOR) 80 MG tablet Take 1 tablet (80 mg total) by mouth once daily.    DOBUTamine (DOBUTREX) 500 mg/250 mL (2,000 mcg/mL) 2.5 mcg/kg/min  Patient is 95.7 kg    JARDIANCE 25 mg tablet Take 1 tablet (25 mg total) by mouth once daily.    ondansetron (ZOFRAN-ODT) 4 MG TbDL Dissolve 1 tablet (4 mg total) by mouth every 6 (six) hours as needed (nausea).    [DISCONTINUED] furosemide (LASIX) 40 MG tablet Take 1 tablet (40 mg total) by mouth daily as needed (Weight gain of 3 lbs in one day or 5 lbs in one week).    [DISCONTINUED] HYDROcodone-acetaminophen (NORCO)  mg per tablet Take 1 tablet by mouth every 6 (six) hours.    [DISCONTINUED] polyethylene glycol (GOLYTELY) 236-22.74-6.74 -5.86 gram suspension SMARTSIG:Milliliter(s) By Mouth    [DISCONTINUED] sacubitriL-valsartan (ENTRESTO) 49-51 mg per tablet Take 1 tablet by mouth 2 (two) times daily.    [DISCONTINUED] simethicone (MYLICON) 80 MG chewable tablet Take 1 tablet (80 mg total) by mouth every 6 (six) hours as needed for Flatulence.    [DISCONTINUED] spironolactone (ALDACTONE) 25 MG tablet Take 1 tablet (25 mg total) by mouth once daily.     Antibiotics (From admission, onward)      Start     Stop Route Frequency Ordered    10/16/22 1330  piperacillin-tazobactam 4.5 g in sodium chloride 0.9% 100 mL IVPB (ready to mix system)         -- IV Every 8 hours (non-standard times) 10/16/22 1245    09/30/22 0900  DAPTOmycin (CUBICIN) 945 mg in sodium chloride 0.9% 50 mL IVPB          -- IV Every 24 hours (non-standard times) 22 1516          Antifungals (From admission, onward)      None          Antivirals (From admission, onward)      None             Immunization History   Administered Date(s) Administered    Hepatitis A, Adult 2005    Influenza 11/15/2002    Influenza (FLUAD) - Quadrivalent - Adjuvanted - PF *Preferred* (65+) 12/15/2021    Influenza - High Dose - PF (65 years and older) 10/12/2017, 2019    Influenza Split 2005    Pneumococcal Conjugate - 13 Valent 2017    Pneumococcal Polysaccharide - 23 Valent 2014, 2019    Td (Adult), Unspecified Formulation 2005    Tdap 2016    Zoster 10/20/2017       Family History       Problem Relation (Age of Onset)    Cancer Mother, Paternal Grandmother    Colon polyps Father    Diabetes Mother    Heart disease Mother, Father    No Known Problems Sister, Daughter, Son, Sister, Son    Stroke Father          Social History     Socioeconomic History    Marital status:     Number of children: 2   Occupational History    Occupation: [employer]     Comment: unemployed   Tobacco Use    Smoking status: Former     Packs/day: 1.00     Years: 45.00     Pack years: 45.00     Types: Cigarettes     Quit date: 2005     Years since quittin.8    Smokeless tobacco: Never    Tobacco comments:     1-1.5 ppd every day.   Substance and Sexual Activity    Alcohol use: No    Drug use: No    Sexual activity: Not Currently     Review of Systems   Constitutional:  Positive for fatigue. Negative for fever.   HENT:  Negative for sore throat.    Eyes:  Negative for visual disturbance.   Respiratory:  Positive for cough. Negative for shortness of breath.    Cardiovascular:  Negative for chest pain and leg swelling.   Gastrointestinal:  Positive for abdominal distention. Negative for abdominal pain, diarrhea and vomiting.   Genitourinary:  Negative for dysuria and hematuria.    Musculoskeletal:  Negative for arthralgias.   Neurological:  Negative for weakness.   Psychiatric/Behavioral:  Negative for confusion.    Objective:     Vital Signs (Most Recent):  Temp: 99.4 °F (37.4 °C) (10/19/22 1100)  Pulse: 102 (10/19/22 1400)  Resp: (!) 43 (10/19/22 1400)  BP: (!) 90/54 (10/19/22 1400)  SpO2: (!) 93 % (10/19/22 1400)   Vital Signs (24h Range):  Temp:  [98.4 °F (36.9 °C)-99.8 °F (37.7 °C)] 99.4 °F (37.4 °C)  Pulse:  [] 102  Resp:  [17-45] 43  SpO2:  [89 %-100 %] 93 %  BP: ()/(52-75) 90/54     Weight: 90.8 kg (200 lb 2.8 oz)  Body mass index is 31.35 kg/m².    Estimated Creatinine Clearance: 30.8 mL/min (A) (based on SCr of 2.4 mg/dL (H)).    Physical Exam  Constitutional:       Appearance: He is ill-appearing.   HENT:      Head: Normocephalic and atraumatic.      Nose: Nose normal.      Mouth/Throat:      Mouth: Mucous membranes are moist.      Pharynx: Oropharynx is clear.   Eyes:      Extraocular Movements: Extraocular movements intact.      Conjunctiva/sclera: Conjunctivae normal.      Pupils: Pupils are equal, round, and reactive to light.   Cardiovascular:      Rate and Rhythm: Regular rhythm. Tachycardia present.      Pulses: Normal pulses.      Heart sounds: Normal heart sounds.   Pulmonary:      Effort: Pulmonary effort is normal.      Breath sounds: Normal breath sounds.      Comments: Nasal canula oxygen support  Abdominal:      General: Bowel sounds are normal. There is distension.      Palpations: Abdomen is soft.      Tenderness: There is no abdominal tenderness. There is no guarding or rebound.   Musculoskeletal:         General: No deformity. Normal range of motion.      Cervical back: Normal range of motion and neck supple.   Skin:     General: Skin is warm and dry.      Coloration: Skin is pale. Skin is not jaundiced.      Findings: No rash.   Neurological:      General: No focal deficit present.      Mental Status: He is alert and oriented to person, place, and  time. Mental status is at baseline.       Significant Labs: Blood Culture:   Recent Labs   Lab 09/18/22  1117 09/30/22  0241 09/30/22  0243 10/16/22  1530 10/16/22  1531   LABBLOO No growth after 5 days. No growth after 5 days. No growth after 5 days. No Growth to date  No Growth to date  No Growth to date No Growth to date  No Growth to date  No Growth to date     BMP:   Recent Labs   Lab 10/19/22  0326   *   *   K 3.6   CL 85*   CO2 29   BUN 36*   CREATININE 2.4*   CALCIUM 9.0   MG 1.8     CBC:   Recent Labs   Lab 10/18/22  0311 10/19/22  0326   WBC 15.34* 15.27*   HGB 9.2* 9.1*   HCT 28.2* 28.4*   * 442     CMP:   Recent Labs   Lab 10/18/22  0311 10/19/22  0326   * 132*   K 4.0 3.6   CL 85* 85*   CO2 31* 29   * 151*   BUN 31* 36*   CREATININE 2.3* 2.4*   CALCIUM 8.9 9.0   ANIONGAP 16 18*     Microbiology Results (last 7 days)       Procedure Component Value Units Date/Time    Blood culture [[id number]] Collected: 10/16/22 1530    Order Status: Completed Specimen: Blood from Peripheral, Antecubital, Right Updated: 10/18/22 1812     Blood Culture, Routine No Growth to date      No Growth to date      No Growth to date    Blood culture [[id number]] Collected: 10/16/22 1531    Order Status: Completed Specimen: Blood from Peripheral, Hand, Right Updated: 10/18/22 1812     Blood Culture, Routine No Growth to date      No Growth to date      No Growth to date    Respiratory Infection Panel (PCR), Nasopharyngeal [[id number]] Collected: 10/16/22 1254    Order Status: Completed Specimen: Nasopharyngeal Swab Updated: 10/16/22 1555     Respiratory Infection Panel Source NP Swab     Adenovirus Not Detected     Coronavirus 229E, Common Cold Virus Not Detected     Coronavirus HKU1, Common Cold Virus Not Detected     Coronavirus NL63, Common Cold Virus Not Detected     Coronavirus OC43, Common Cold Virus Not Detected     Comment: The Coronavirus strains detected in this test cause the common  cold.  These strains are not the COVID-19 (novel Coronavirus)strain   associated with the respiratory disease outbreak.          SARS-CoV2 (COVID-19) Qualitative PCR Not Detected     Human Metapneumovirus Not Detected     Human Rhinovirus/Enterovirus Not Detected     Influenza A (subtypes H1, H1-2009,H3) Not Detected     Influenza B Not Detected     Parainfluenza Virus 1 Not Detected     Parainfluenza Virus 2 Not Detected     Parainfluenza Virus 3 Not Detected     Parainfluenza Virus 4 Not Detected     Respiratory Syncytial Virus Not Detected     Bordetella Parapertussis (BG1080) Not Detected     Bordetella pertussis (ptxP) Not Detected     Chlamydia pneumoniae Not Detected     Mycoplasma pneumoniae Not Detected    Narrative:      For all other respiratory sources, order [id number] -  Respiratory Viral Panel by PCR    Influenza A & B by Molecular [[id number]] Collected: 10/16/22 1300    Order Status: Completed Specimen: Nasopharyngeal Swab Updated: 10/16/22 1441     Influenza A, Molecular Negative     Influenza B, Molecular Negative     Flu A & B Source Nasal swab    Culture, Respiratory with Gram Stain [[id number]]     Order Status: No result Specimen: Respiratory           Pathology Results  (Last 10 years)                 08/08/22 1037  Specimen to Pathology, Surgery Gastrointestinal tract Final result    Narrative:  Pre-op Diagnosis: Chronic combined systolic and diastolic   congestive heart failure [I50.42]   History of colon polyps [Z86.010]   Procedure(s):   COLONOSCOPY   Number of specimens:   Name of specimens:   Jar 1 rectal polyp   Which provider would you like to cc?->PARENTSUGAR   Release to patient->Immediate   Specimen total (fresh, frozen, permanent):->1       12/19/20 1246  Specimen to Pathology, Surgery Orthopedics Final result    Narrative:  Pre-op Diagnosis: Cellulitis of left hand [L03.114]   Procedure(s):   INCISION AND DRAINAGE, UPPER EXTREMITY, left hand, hand   table, cysto tubing, 3L  saline bag   Number of specimens: 1   Name of specimens: 1. Left index MCP joint -Permanent   Specimen total (fresh, frozen, permanent):->1             All pertinent labs within the past 24 hours have been reviewed.  Recent Lab Results         10/19/22  0440   10/19/22  0326   10/18/22  2001        Allens Test N/A     N/A       Anion Gap   18         aPTT   55.9  Comment: aPTT therapeutic range = 39-69 seconds         Baso #   0.02         Basophil %   0.1         Site Other     Other       BUN   36         Calcium   9.0         Chloride   85         CO2   29         Creatinine   2.4         DelSys Nasal Can     Nasal Can       Differential Method   Automated         eGFR   28.3         Eos #   0.2         Eosinophil %   1.0         Flow 3     4       Glucose   151         Gran # (ANC)   12.9         Gran %   84.3         Hematocrit   28.4         Hemoglobin   9.1         Immature Grans (Abs)   0.08  Comment: Mild elevation in immature granulocytes is non specific and   can be seen in a variety of conditions including stress response,   acute inflammation, trauma and pregnancy. Correlation with other   laboratory and clinical findings is essential.           Immature Granulocytes   0.5         Lymph #   1.0         Lymph %   6.8         Magnesium   1.8         MCH   27.2         MCHC   32.0         MCV   85         Mode SPONT     SPONT       Mono #   1.1         Mono %   7.3         MPV   9.9         nRBC   0         Platelets   442         POC BE 14     14       POC HCO3 37.2     37.9       POC PCO2 48.8     54.0       POC PH 7.491     7.454       POC PO2 29     28       POC SATURATED O2 59     55       POC TCO2 39     40       Potassium   3.6         RBC   3.34         RDW   16.2         Sample VENOUS     VENOUS       Sodium   132         Sp02 94     97       WBC   15.27                 Significant Imaging: I have reviewed all pertinent imaging results/findings within the past 24 hours.

## 2022-10-19 NOTE — ASSESSMENT & PLAN NOTE
MRSA bacteremia - continue Daptomycin IV - EOT 11/9/2-022, see prior OPAT note for details. He expressed he is willing to see us in the clinic for follow up to ensure completion of OPAT therapy. Monitor weekly CK.

## 2022-10-20 NOTE — PLAN OF CARE
Cardiac ICU Care Plan    POC reviewed with Audrey Oneil Jr. and family. Questions and concerns addressed. No acute events today. Pt progressing toward goals. Will continue to monitor. See below and flowsheets for full assessment and VS info.       Neuro:  Lai Coma Scale  Best Eye Response: 4-->(E4) spontaneous  Best Motor Response: 6-->(M6) obeys commands  Best Verbal Response: 5-->(V5) oriented  [place] Coma Scale Score: 15  Assessment Qualifiers: patient not sedated/intubated  Pupil PERRLA: yes    24 hr Temp:  [98.9 °F (37.2 °C)-99.8 °F (37.7 °C)]      CV:  Rhythm: normal sinus rhythm   DVT prophylaxis: VTE Required Core Measure: Pharmacological prophylaxis initiated/maintained    CVP (mean): (S) 5 mmHg (10/19/22 0305)    [REMOVED] PICC Double Lumen 09/22/22 1507 right basilic-Current Insertion Depth (cm): 36 cm (09/22/22 1507)  SVO2 (%): 49 % (10/17/22 0400)               Pulses  Right Radial Pulse: 2+ (normal)  Left Radial Pulse: 2+ (normal)  Right Dorsalis Pedis Pulse: 1+ (weak)  Left Dorsalis Pedis Pulse: 1+ (weak)  Right Posterior Tibial Pulse: 1+ (weak)  Left Posterior Tibial Pulse: 1+ (weak)    Resp:  O2 Device (Oxygen Therapy): nasal cannula w/ humidification  Flow (L/min): 3  Vent Mode: Spont  Set Rate: 20 BPM  Oxygen Concentration (%): 28  Vt Set: 500 mL  PEEP/CPAP: 5 cmH20  Pressure Support: 4 cmH20    GI/:  GI prophylaxis: yes  Diet/Nutrition Received: low saturated fat/low cholesterol, 2 gram sodium  Last Bowel Movement: 10/18/22  Voiding Characteristics: voids spontaneously without difficulty  [REMOVED]      Urethral Catheter 09/27/22 0755 Double-lumen 16 Fr.-Reason for Continuing Urinary Catheterization: Post operative, Critically ill in ICU and requiring hourly monitoring of intake/output  [REMOVED]      Urethral Catheter 10/17/22 0430 Double-lumen 16 Fr.-Reason for Continuing Urinary Catheterization: Critically ill in ICU and requiring hourly monitoring of intake/output   Intake/Output  Summary (Last 24 hours) at 10/20/2022 0616  Last data filed at 10/20/2022 0605  Gross per 24 hour   Intake 812.97 ml   Output 650 ml   Net 162.97 ml        Nutritional Supplement Intake: Quantity none, Type: Boost    Labs/Accuchecks:  Recent Labs   Lab 10/18/22  0311 10/19/22  0326 10/20/22  0316   WBC 15.34* 15.27* 14.27*   RBC 3.37* 3.34* 3.16*   HGB 9.2* 9.1* 8.5*   HCT 28.2* 28.4* 26.5*   * 442 404      Recent Labs   Lab 10/18/22  0311 10/19/22  0326 10/20/22  0316   APTT 57.6* 55.9* 70.1*      Recent Labs     10/20/22  0316   *   K 3.9   CO2 27   CL 84*   BUN 48*   CREATININE 3.1*     No results for input(s): CPK, CPKMB, MB, TROPONINI in the last 168 hours.   Recent Labs     10/18/22  1341 10/18/22  2001 10/19/22  0440   PH 7.435 7.454* 7.491*   PCO2 51.8* 54.0* 48.8*   PO2 28* 28* 29*   HCO3 34.8* 37.9* 37.2*   POCSATURATED 52* 55* 59*   BE 11 14 14       Electrolytes: Electrolytes replaced  Accuchecks: none    Gtts/LDAs:   sodium chloride 0.9% Stopped (09/28/22 1550)    sodium chloride 0.9% 5 mL/hr at 10/15/22 1051    DOBUTamine IV infusion (non-titrating) 2.5 mcg/kg/min (10/20/22 0605)    heparin (porcine) in D5W 17 Units/kg/hr (10/20/22 0605)       Lines/Drains/Airways       Central Venous Catheter Line  Duration             Tunneled Central Line Insertion/Assessment - Triple Lumen  10/14/22 1800 right subclavian 5 days                    Skin/Wounds  Bathing/Skin Care: patient refused (10/19/22 1115)  Wounds: No  Wound care consulted: No

## 2022-10-20 NOTE — PROGRESS NOTES
DISCHARGE     SW to pt's room for discharge today.  Pt presents as AAO x4, calm, cooperative, and asking and answering questions appropriately,gf at bedside.  Pt verbalizes understanding and agreement with plan for d/c to home today with  & IV ABX with [facility] (Pt refusing hospice referrals). Pt's family brought O2 E tank and carrier, tubing provided last night, Pt's family did not have regulator Jill TALAMANTES UM/CM obtaining via [facility] who will also provide education on supplies and how to order additional O2 tanks when used. Pt states he has O2 concentrator at home in working order. Pt has life vest at bedside, GF states they don't know how to use it, Jill TALAMANTES messaged [employer] life vest rep to provide additional education for Pt and caregiver.  Pt reports his family will transport him home today.  Pt denies any d/c needs, and none identified by medical team. LCSW confirmed with [facility] (p: [phone], f: [phone]) that they will see Pt on Friday 10/21/22. [facility] ([phone]; fax [phone]) provided  and IV ABX on the evening of 10/19/22. Pt reports coping well at this time, and denies any needs or concerns to SW. LCSW faxed discharge paperwork to [facility] (f:[phone]). SW providing ongoing psychosocial and counseling support, education, resources, assistance, and discharge planning as indicated.  SW remains available.         LCSW confirmed with [facility] that PT  will be going to [address] [place] [id number].    Bedside RN confirmed that  [employer] rep ([employer]) and [facility] have been by the room to provide supplies and education.

## 2022-10-20 NOTE — PLAN OF CARE
Home Oxygen Evaluation    Date Performed: 10/20/2022    1) Patient's Home O2 Sat on room air, while at rest: 84%        If O2 sats on room air at rest are 88% or below, patient qualifies. No additional testing needed. Document N/A in steps 2 and 3. If 89% or above, complete steps 2.    2) Patient's O2 Sat on room air while exercising:        If O2 sats on room air while exercising remain 89% or above patient does not qualify, no further testing needed Document N/A in step 3. If O2 sats on room air while exercising are 88% or below, continue to step 3.      3) Patient's O2 Sat while exercising on O2:  92% at  4LPM         (Must show improvement from #2 for patients to qualify)    If O2 sats improve on oxygen, patient qualifies for portable oxygen. If not, the patient does not qualify.

## 2022-10-20 NOTE — PROGRESS NOTES
[facility] - Cardiac Intensive Care  Heart Transplant  Progress Note    Patient Name: Audrey Oneil Jr.  MRN: [id number]  Admission Date: 9/14/2022  Hospital Length of Stay: 36 days  Attending Physician: No att. providers found  Primary Care Provider: Primary Doctor No  Principal Problem:Acute on chronic combined systolic and diastolic heart failure    Subjective:     Interval History: NAEON. Patient ready for discharge this AM. Will go with  with AIM program.     Continuous Infusions:   sodium chloride 0.9% Stopped (09/28/22 1550)    sodium chloride 0.9% 5 mL/hr at 10/15/22 1051    DOBUTamine IV infusion (non-titrating) 2.5 mcg/kg/min (10/20/22 0605)    heparin (porcine) in D5W 17 Units/kg/hr (10/20/22 0605)     Scheduled Meds:   allopurinoL  200 mg Oral Daily    amiodarone  300 mg Oral Daily    aspirin  81 mg Oral Daily    atorvastatin  80 mg Oral QHS    DAPTOmycin (CUBICIN) IV (PEDS and ADULTS)  10 mg/kg Intravenous Q24H    famotidine  20 mg Oral Daily    LIDOcaine  1 patch Transdermal Q24H    LIDOcaine HCl 2%  15 mL Oral Once    olopatadine  1 drop Both Eyes Daily    piperacillin-tazobactam (ZOSYN) IVPB  4.5 g Intravenous Q8H    polyethylene glycol  17 g Oral Daily    potassium chloride  40 mEq Oral Once    sucralfate  1 g Oral BID     PRN Meds:sodium chloride, acetaminophen, artificial tears, dextromethorphan-guaiFENesin  mg, heparin (PORCINE), heparin (PORCINE), HYDROcodone-acetaminophen, melatonin, methocarbamoL, ondansetron, senna-docusate 8.6-50 mg, sodium chloride 0.9%    Review of patient's allergies indicates:   Allergen Reactions    Iodine containing multivitamin Swelling     itching    Keflex [cephalexin] Swelling     Eyes.  Tolerated multiple doses of zosyn and 1 dose of augmentin in 2015 and 2016, respectively    Peaches [peach (prunus persica)] Swelling     eyes    Shellfish containing products Swelling    Fig tree Swelling     itching    Tuberculin spenser test ppd Rash      Objective:     Vital Signs (Most Recent):  Temp: 98.9 °F (37.2 °C) (10/20/22 0305)  Pulse: 86 (10/20/22 0605)  Resp: 20 (10/20/22 0651)  BP: (!) 82/56 (10/20/22 0605)  SpO2: (!) 92 % (on 4lpm) (10/20/22 1145)   Vital Signs (24h Range):  Temp:  [98.9 °F (37.2 °C)] 98.9 °F (37.2 °C)  Pulse:  [] 86  Resp:  [20-50] 20  SpO2:  [89 %-100 %] 92 %  BP: ()/(50-66) 82/56     No data found.  Body mass index is 31.35 kg/m².      Intake/Output Summary (Last 24 hours) at 10/20/2022 1438  Last data filed at 10/20/2022 0605  Gross per 24 hour   Intake 471.02 ml   Output 300 ml   Net 171.02 ml           Physical Exam  Constitutional:       General: He is not in acute distress.     Appearance: Normal appearance. He is normal weight. He is not ill-appearing or toxic-appearing.   HENT:      Head: Normocephalic and atraumatic.      Nose: Nose normal. No congestion.      Mouth/Throat:      Mouth: Mucous membranes are moist.      Pharynx: No oropharyngeal exudate.   Eyes:      General:         Right eye: No discharge.         Left eye: No discharge.      Extraocular Movements: Extraocular movements intact.      Pupils: Pupils are equal, round, and reactive to light.   Cardiovascular:      Rate and Rhythm: Normal rate and regular rhythm.      Heart sounds: No murmur heard.    No friction rub. No gallop.   Pulmonary:      Effort: Pulmonary effort is normal. No respiratory distress.      Breath sounds: Normal breath sounds. No wheezing, rhonchi or rales.   Abdominal:      General: Abdomen is flat. Bowel sounds are normal. There is no distension.      Palpations: Abdomen is soft.      Tenderness: There is no abdominal tenderness.   Musculoskeletal:         General: No swelling. Normal range of motion.      Cervical back: Normal range of motion. No rigidity.      Right lower leg: No edema.      Left lower leg: No edema.   Skin:     General: Skin is warm and dry.      Findings: No lesion or rash.   Neurological:      General: No  focal deficit present.      Mental Status: He is alert and oriented to person, place, and time.      Cranial Nerves: No cranial nerve deficit.      Motor: No weakness.       Significant Labs:  CBC:  Recent Labs   Lab 10/18/22  0311 10/19/22  0326 10/20/22  0316   WBC 15.34* 15.27* 14.27*   RBC 3.37* 3.34* 3.16*   HGB 9.2* 9.1* 8.5*   HCT 28.2* 28.4* 26.5*   * 442 404   MCV 84 85 84   MCH 27.3 27.2 26.9*   MCHC 32.6 32.0 32.1       BNP:  No results for input(s): BNP in the last 168 hours.    Invalid input(s): BNPTRIAGELBLO  CMP:  Recent Labs   Lab 10/18/22  0311 10/19/22  0326 10/20/22  0316   * 151* 147*   CALCIUM 8.9 9.0 9.0   * 132* 128*   K 4.0 3.6 3.9   CO2 31* 29 27   CL 85* 85* 84*   BUN 31* 36* 48*   CREATININE 2.3* 2.4* 3.1*        Coagulation:   Recent Labs   Lab 10/18/22  0311 10/19/22  0326 10/20/22  0316   APTT 57.6* 55.9* 70.1*       LDH:  No results for input(s): LDH in the last 72 hours.  Microbiology:  Microbiology Results (last 7 days)       Procedure Component Value Units Date/Time    Blood culture [[id number]] Collected: 10/16/22 1530    Order Status: Completed Specimen: Blood from Peripheral, Antecubital, Right Updated: 10/19/22 1812     Blood Culture, Routine No Growth to date      No Growth to date      No Growth to date      No Growth to date    Blood culture [[id number]] Collected: 10/16/22 1531    Order Status: Completed Specimen: Blood from Peripheral, Hand, Right Updated: 10/19/22 1812     Blood Culture, Routine No Growth to date      No Growth to date      No Growth to date      No Growth to date    Respiratory Infection Panel (PCR), Nasopharyngeal [[id number]] Collected: 10/16/22 1254    Order Status: Completed Specimen: Nasopharyngeal Swab Updated: 10/16/22 1555     Respiratory Infection Panel Source NP Swab     Adenovirus Not Detected     Coronavirus 229E, Common Cold Virus Not Detected     Coronavirus HKU1, Common Cold Virus Not Detected     Coronavirus NL63, Common  Cold Virus Not Detected     Coronavirus OC43, Common Cold Virus Not Detected     Comment: The Coronavirus strains detected in this test cause the common cold.  These strains are not the COVID-19 (novel Coronavirus)strain   associated with the respiratory disease outbreak.          SARS-CoV2 (COVID-19) Qualitative PCR Not Detected     Human Metapneumovirus Not Detected     Human Rhinovirus/Enterovirus Not Detected     Influenza A (subtypes H1, H1-2009,H3) Not Detected     Influenza B Not Detected     Parainfluenza Virus 1 Not Detected     Parainfluenza Virus 2 Not Detected     Parainfluenza Virus 3 Not Detected     Parainfluenza Virus 4 Not Detected     Respiratory Syncytial Virus Not Detected     Bordetella Parapertussis (GH2038) Not Detected     Bordetella pertussis (ptxP) Not Detected     Chlamydia pneumoniae Not Detected     Mycoplasma pneumoniae Not Detected    Narrative:      For all other respiratory sources, order [id number] -  Respiratory Viral Panel by PCR    Influenza A & B by Molecular [[id number]] Collected: 10/16/22 1300    Order Status: Completed Specimen: Nasopharyngeal Swab Updated: 10/16/22 1441     Influenza A, Molecular Negative     Influenza B, Molecular Negative     Flu A & B Source Nasal swab    Culture, Respiratory with Gram Stain [[id number]]     Order Status: No result Specimen: Respiratory             BMP:   Recent Labs   Lab 10/20/22  0316   *   *   K 3.9   CL 84*   CO2 27   BUN 48*   CREATININE 3.1*   CALCIUM 9.0   MG 2.5       I have reviewed all pertinent labs within the past 24 hours.    Estimated Creatinine Clearance: 23.8 mL/min (A) (based on SCr of 3.1 mg/dL (H)).    Diagnostic Results:  Reviewed     Assessment and Plan:     73 yo WM being worked up for LVAD since hospitalization January 2022 for recurrent VT (he thinks had MI) and cardiogenic shock at [facility]. He was  later seen in [facility] where he was treated for cardiogenic shock and sent home on .  He remains on  and is  "pursuing evaluation for LVAD.     He has had a couple of observation admissions where he was told to be dry at visit June 2022. At July 2022 visit he was noted that he could not tolerate higher doses of meds due to symptomatic hypotension.  He still notes occasional dizzy spells when he 1st stands but there are not too bad at this time.  Uses Lasix only as needed targeting his home weight 218-220.      His case was discussed at selection committee and he was deferred pending evaluation of pancreatic mass.  They wish to proceed with MRI but not sure with his ICD if this will be possible. Home weight this AM was 216#.    Interval History:  Today he comes in due to abdominal discomfort. He ate packaged spaghetti last night and has since been with abdominal pain and dry heaving. No vomiting or diarrhea. He intermittently uses home oxygen 4L and felt the need to start using last night. He states he feels like " a bug got him". He reports noticing that his HR was significantly elevated over night about 100-115bpm. He is compliant with his lasix. No lower extremity edema. Felt warm this morning. His abdomen appears distended however he reports it normally gets firm/tight when volume overloaded, which is not the case at this time.     Bedside echo shows EF approx 25-30%. IVC measuring approx 1.4cm and collapsible. No JVD. CXR shows bilateral pulmonary congestion.    Echo 9/14/22   Moderate left atrial enlargement.   The left ventricle is severely enlarged with eccentric hypertrophy and severely decreased systolic function.   There is severe left ventricular global hypokinesis with anterospetal and apical scar. The estimated ejection fraction is 10-15%.   Left ventricular diastolic dysfunction.   Normal right ventricular size with normal right ventricular systolic function.   Mild mitral regurgitation.   Normal central venous pressure (3 mmHg).   There is no significant tricuspid regurgitation and therefore the " pulmonary artery systolic pressure cannot be reported.         * Acute on chronic combined systolic and diastolic heart failure  - Dobutamine 2.5, Epi .02. Continue weaning epi down.   - Holding: Spironolactone 25 mg, daily, Jardiance and was previously on Entresto 49-51 mg, BID  - Transition from lasix gtt to IV boluses. CVP single digits.   - Daily weights, Strict I/Os  - Monitor electrolytes and Maintain Mg >2 and K >4  -Telemetry monitoring    Acute thrombosis of left brachial vein  Started on Heparin. IV over left arm replaced with rt arm iv. Will plan to get a PICC over the rt arm and monitor CVP and Mixed venous oxygen.     Pancreatic lesion  - Gastroenterology consult and recommendations appreciated  - Unable to obtain MRI for further evaluation given CRT-D  - Will hold off on obtaining CT with pancreatic protocol at this time in view of active infection requiring vancomycin and risk of contrast-induced nephrotoxicity in this patient    H/O ventricular tachycardia  - Amiodarone 300 mg, daily  - Monitor LFTs    Coronary artery disease involving native coronary artery of native heart without angina pectoris  - Asprin 81 mg, daily  - Atorvastatin 80 mg, nightly        Venkatesh Love PA-C  Heart Transplant  [facility] - Cardiac Intensive Care

## 2022-10-20 NOTE — PROGRESS NOTES
Care of patient is being transferred to CHF section from Preht section, per Dr. Nieto.    Dx: ICM  Reason: Sent to medical management, not a candidate for Adv. Options. Declined for LVAD 10/19/2022.      Pt is on home inotrope therapy.  Med/Dose: Dobutamine 2.5 mcg/kg/min  Infusion Company: [facility] (ph: [phone], fax: [phone])   agency: [facility]  Phone: [phone]  Fax: [phone]  Lab Schedule: Mondays (to start 10/24/22)  Labs ordered: CBC, CMP, CPK  Outstanding orders scheduled: RTC 10/26/22 with Dr. Henry

## 2022-10-20 NOTE — SUBJECTIVE & OBJECTIVE
Interval History: NAEON. Patient ready for discharge this AM. Will go with  with AIM program.     Continuous Infusions:   sodium chloride 0.9% Stopped (09/28/22 1550)    sodium chloride 0.9% 5 mL/hr at 10/15/22 1051    DOBUTamine IV infusion (non-titrating) 2.5 mcg/kg/min (10/20/22 0605)    heparin (porcine) in D5W 17 Units/kg/hr (10/20/22 0605)     Scheduled Meds:   allopurinoL  200 mg Oral Daily    amiodarone  300 mg Oral Daily    aspirin  81 mg Oral Daily    atorvastatin  80 mg Oral QHS    DAPTOmycin (CUBICIN) IV (PEDS and ADULTS)  10 mg/kg Intravenous Q24H    famotidine  20 mg Oral Daily    LIDOcaine  1 patch Transdermal Q24H    LIDOcaine HCl 2%  15 mL Oral Once    olopatadine  1 drop Both Eyes Daily    piperacillin-tazobactam (ZOSYN) IVPB  4.5 g Intravenous Q8H    polyethylene glycol  17 g Oral Daily    potassium chloride  40 mEq Oral Once    sucralfate  1 g Oral BID     PRN Meds:sodium chloride, acetaminophen, artificial tears, dextromethorphan-guaiFENesin  mg, heparin (PORCINE), heparin (PORCINE), HYDROcodone-acetaminophen, melatonin, methocarbamoL, ondansetron, senna-docusate 8.6-50 mg, sodium chloride 0.9%    Review of patient's allergies indicates:   Allergen Reactions    Iodine containing multivitamin Swelling     itching    Keflex [cephalexin] Swelling     Eyes.  Tolerated multiple doses of zosyn and 1 dose of augmentin in 2015 and 2016, respectively    Peaches [peach (prunus persica)] Swelling     eyes    Shellfish containing products Swelling    Fig tree Swelling     itching    Tuberculin spenser test ppd Rash     Objective:     Vital Signs (Most Recent):  Temp: 98.9 °F (37.2 °C) (10/20/22 0305)  Pulse: 86 (10/20/22 0605)  Resp: 20 (10/20/22 0651)  BP: (!) 82/56 (10/20/22 0605)  SpO2: (!) 92 % (on 4lpm) (10/20/22 1145)   Vital Signs (24h Range):  Temp:  [98.9 °F (37.2 °C)] 98.9 °F (37.2 °C)  Pulse:  [] 86  Resp:  [20-50] 20  SpO2:  [89 %-100 %] 92 %  BP: ()/(50-66) 82/56     No data  found.  Body mass index is 31.35 kg/m².      Intake/Output Summary (Last 24 hours) at 10/20/2022 1438  Last data filed at 10/20/2022 0605  Gross per 24 hour   Intake 471.02 ml   Output 300 ml   Net 171.02 ml           Physical Exam  Constitutional:       General: He is not in acute distress.     Appearance: Normal appearance. He is normal weight. He is not ill-appearing or toxic-appearing.   HENT:      Head: Normocephalic and atraumatic.      Nose: Nose normal. No congestion.      Mouth/Throat:      Mouth: Mucous membranes are moist.      Pharynx: No oropharyngeal exudate.   Eyes:      General:         Right eye: No discharge.         Left eye: No discharge.      Extraocular Movements: Extraocular movements intact.      Pupils: Pupils are equal, round, and reactive to light.   Cardiovascular:      Rate and Rhythm: Normal rate and regular rhythm.      Heart sounds: No murmur heard.    No friction rub. No gallop.   Pulmonary:      Effort: Pulmonary effort is normal. No respiratory distress.      Breath sounds: Normal breath sounds. No wheezing, rhonchi or rales.   Abdominal:      General: Abdomen is flat. Bowel sounds are normal. There is no distension.      Palpations: Abdomen is soft.      Tenderness: There is no abdominal tenderness.   Musculoskeletal:         General: No swelling. Normal range of motion.      Cervical back: Normal range of motion. No rigidity.      Right lower leg: No edema.      Left lower leg: No edema.   Skin:     General: Skin is warm and dry.      Findings: No lesion or rash.   Neurological:      General: No focal deficit present.      Mental Status: He is alert and oriented to person, place, and time.      Cranial Nerves: No cranial nerve deficit.      Motor: No weakness.       Significant Labs:  CBC:  Recent Labs   Lab 10/18/22  0311 10/19/22  0326 10/20/22  0316   WBC 15.34* 15.27* 14.27*   RBC 3.37* 3.34* 3.16*   HGB 9.2* 9.1* 8.5*   HCT 28.2* 28.4* 26.5*   * 442 404   MCV 84 85 84    MCH 27.3 27.2 26.9*   MCHC 32.6 32.0 32.1       BNP:  No results for input(s): BNP in the last 168 hours.    Invalid input(s): BNPTRIAGELBLO  CMP:  Recent Labs   Lab 10/18/22  0311 10/19/22  0326 10/20/22  0316   * 151* 147*   CALCIUM 8.9 9.0 9.0   * 132* 128*   K 4.0 3.6 3.9   CO2 31* 29 27   CL 85* 85* 84*   BUN 31* 36* 48*   CREATININE 2.3* 2.4* 3.1*        Coagulation:   Recent Labs   Lab 10/18/22  0311 10/19/22  0326 10/20/22  0316   APTT 57.6* 55.9* 70.1*       LDH:  No results for input(s): LDH in the last 72 hours.  Microbiology:  Microbiology Results (last 7 days)       Procedure Component Value Units Date/Time    Blood culture [[id number]] Collected: 10/16/22 1530    Order Status: Completed Specimen: Blood from Peripheral, Antecubital, Right Updated: 10/19/22 1812     Blood Culture, Routine No Growth to date      No Growth to date      No Growth to date      No Growth to date    Blood culture [[id number]] Collected: 10/16/22 1531    Order Status: Completed Specimen: Blood from Peripheral, Hand, Right Updated: 10/19/22 1812     Blood Culture, Routine No Growth to date      No Growth to date      No Growth to date      No Growth to date    Respiratory Infection Panel (PCR), Nasopharyngeal [[id number]] Collected: 10/16/22 1254    Order Status: Completed Specimen: Nasopharyngeal Swab Updated: 10/16/22 1555     Respiratory Infection Panel Source NP Swab     Adenovirus Not Detected     Coronavirus 229E, Common Cold Virus Not Detected     Coronavirus HKU1, Common Cold Virus Not Detected     Coronavirus NL63, Common Cold Virus Not Detected     Coronavirus OC43, Common Cold Virus Not Detected     Comment: The Coronavirus strains detected in this test cause the common cold.  These strains are not the COVID-19 (novel Coronavirus)strain   associated with the respiratory disease outbreak.          SARS-CoV2 (COVID-19) Qualitative PCR Not Detected     Human Metapneumovirus Not Detected     Human  Rhinovirus/Enterovirus Not Detected     Influenza A (subtypes H1, H1-2009,H3) Not Detected     Influenza B Not Detected     Parainfluenza Virus 1 Not Detected     Parainfluenza Virus 2 Not Detected     Parainfluenza Virus 3 Not Detected     Parainfluenza Virus 4 Not Detected     Respiratory Syncytial Virus Not Detected     Bordetella Parapertussis (OI0760) Not Detected     Bordetella pertussis (ptxP) Not Detected     Chlamydia pneumoniae Not Detected     Mycoplasma pneumoniae Not Detected    Narrative:      For all other respiratory sources, order [id number] -  Respiratory Viral Panel by PCR    Influenza A & B by Molecular [[id number]] Collected: 10/16/22 1300    Order Status: Completed Specimen: Nasopharyngeal Swab Updated: 10/16/22 1441     Influenza A, Molecular Negative     Influenza B, Molecular Negative     Flu A & B Source Nasal swab    Culture, Respiratory with Gram Stain [[id number]]     Order Status: No result Specimen: Respiratory             BMP:   Recent Labs   Lab 10/20/22  0316   *   *   K 3.9   CL 84*   CO2 27   BUN 48*   CREATININE 3.1*   CALCIUM 9.0   MG 2.5       I have reviewed all pertinent labs within the past 24 hours.    Estimated Creatinine Clearance: 23.8 mL/min (A) (based on SCr of 3.1 mg/dL (H)).    Diagnostic Results:  Reviewed

## 2022-10-21 NOTE — PROGRESS NOTES
C3 nurse attempted to contact Audrey Oneil Jr. for a TCC post hospital discharge follow up call. No answer. The patient does not have a scheduled HOSFU appointment, and the pt does not have an [facility] PCP.

## 2022-10-22 NOTE — TELEPHONE ENCOUNTER
Reason for Disposition   [1] Difficulty breathing with exertion (e.g., walking) AND [2] new-onset or worsening    Additional Information   Negative: SEVERE difficulty breathing (e.g., struggling for each breath, speaks in single words)   Negative: Looks like a broken bone or dislocated joint (e.g., crooked or deformed)   Negative: Sounds like a life-threatening emergency to the triager   Negative: Chest pain   Negative: Followed a leg injury   Negative: [1] Small area of swelling AND [2] followed an insect bite to the area   Negative: Swelling of one ankle joint   Negative: Swelling of knee is main symptom   Negative: Pregnant   Negative: Postpartum (from 0 to 6 weeks after delivery)   Negative: [1] Heart failure suspected (e.g., ankle swelling, shortness of breath, weight gain) AND [2] recently in hospital for heart failure   Negative: Difficulty breathing at rest   Negative: Entire foot is cool or blue in comparison to other side   Negative: [1] Can't walk or can barely walk AND [2] new-onset    Protocols used: Leg Swelling and Edema-A-AH  daughter called re was in hosp and developed a blood infection. Pt on tow abx and defib and pacer. Removed.pt was placed on list to get LVAD- then removed since weak - pt has been in and out of ICU. tried central line to to put pt on dobutamine pt then was placed back on vent. pt was fine then 3 days later voice got really weak. thurs 10/20 pt left hosp.   Family states pt sent home with list of meds. Pt was taken off spironolactone ,Lasix & entresto but not sure why?  Caller states pt feet starting to swell. no CP, no SOB.  pt on docymycinin via central port, Amoxil po. spoke with HH - got orders but didn't get pts status.  Spoke with dr spencer. MD delgadillo transferred to speak with family.

## 2022-10-24 NOTE — PROGRESS NOTES
2nd Attempt made to reach patient for TCC call. Left voicemail please call [phone] leave first name, last name, and  Luisa will return your call.

## 2022-10-25 NOTE — PROGRESS NOTES
C3 nurse spoke with Audrey Oneil 's sister for a TCC post hospital discharge follow up call. The patient reports does not have a scheduled HOSFU appointment. C3 nurse was unable to schedule HOSFU appointment [facility]. Patient advised to contact their PCP to schedule a HOSPFU within 5-7 days.        Per pt request, referral placed for NP home visit.

## 2022-10-25 NOTE — TELEPHONE ENCOUNTER
Needs Zofran    Still taking Augmentin, three doses remaining    Colcrys taking prn for flare ups    Mucinex DM cough, chest congestion max strength OTC     Secure chat regarding pt's need to adjust life vest. Provided Riana with device help line number and referred to device instructions provided at d/c, assistance/infor provided by LILIANCW and VINITA Ramos. Sister voiced understanding.

## 2022-10-26 NOTE — TELEPHONE ENCOUNTER
----- Message from Demarcus Russell MD sent at 10/25/2022  5:53 PM CDT -----  His LFTs are elevated.  Please contact the patient and find out if he has any nausea or vomiting.  Advise him to avoid anything that contains acetaminophen and avoid alcohol.  See if we can repeat a CMP on Thursday.

## 2022-10-26 NOTE — LETTER
November 5, 2022        Isiah Adrian Jr.  [address]  [address], [address]  [place] [address]  Phone: [phone]  Fax: [phone]             [id number]  [address]  [place] [address]  Phone: [phone]   Patient: Audrey Oneil Jr.   MR Number: [id number]   YOB: 1950   Date of Visit: 10/26/2022       Dear Dr. Isiah Adrian Jr.    Thank you for referring Audrey Oneil to me for evaluation. Attached you will find relevant portions of my assessment and plan of care.    If you have questions, please do not hesitate to call me. I look forward to following Audrey Oneil along with you.    Sincerely,    Migue Henry Jr, MD    Enclosure    If you would like to receive this communication electronically, please contact [email or web address] or [phone] to request [employer] access.    [facility] is a tool which provides read-only access to select patient information with whom you have a relationship. Its easy to use and provides real time access to review your patients record including encounter summaries, notes, results, and demographic information.    If you feel you have received this communication in error or would no longer like to receive these types of communications, please e-mail [email or web address]

## 2022-10-26 NOTE — TELEPHONE ENCOUNTER
Called pt, spoke to his partner, Clarissa  She stated pt is having nausea and abdominal pain.   He's been taking zofran to help with the nausea.  Advice Clarissa to avoid giving him acetaminophen and zofran, as well as drinking alcohol.   Stated pt is not drink alcohol at the moment.   Informed her we will get repeat labs on Thursday. She verbalized understanding.    Called  [facility] at [phone], spoke to   Gave verbal orders to collect STAT CMP Thursday.   She verbalized understanding.

## 2022-10-29 NOTE — ED PROVIDER NOTES
Encounter Date: 10/28/2022       History     Chief Complaint   Patient presents with    Shortness of Breath     71 yo M presents via EMS in cardiopulmonary arrest.  Chart review shows a history of acute hypoxic respiratory failure, bronchopneumonia, severe combined systolic and diastolic heart failure, chronic Milrinone drip, coronary artery disease, Life Vest in place.  EMS report prior to arrival noted an oxygen saturation in the 50s.  EMS provided bag-valve mask ventilations MRI only able to achieve oxygen saturations in the 70s.  Per EMS, multiple family members moist patient is DNR DNI wishes.  On arrival, patient was in obvious distress.  His DNR DNI wishes were communicated by his significant other and son    Review of patient's allergies indicates:   Allergen Reactions    Iodine containing multivitamin Swelling     itching    Keflex [cephalexin] Swelling     Eyes.  Tolerated multiple doses of zosyn and 1 dose of augmentin in 2015 and 2016, respectively    Peaches [peach (prunus persica)] Swelling     eyes    Shellfish containing products Swelling    Fig tree Swelling     itching    Tuberculin spenser test ppd Rash     Past Medical History:   Diagnosis Date    Acute hypoxemic respiratory failure 11/7/2015    Acute idiopathic gout of left knee 12/2/2019    Acute idiopathic gout of right elbow 9/23/2021    AICD (automatic cardioverter/defibrillator) present 12/13/2015      Anticoagulant long-term use     Bronchopneumonia 12/20/2021    CHF (congestive heart failure)     Chronic anticoagulation 5/5/2016    Chronic combined systolic and diastolic heart failure 11/26/2012    EF 10-20% on ECHO 2013    Chronic gout 12/2/2019    Clotting disorder     Coronary artery disease involving native coronary artery of native heart without angina pectoris 11/26/2012    Cath 10- Stents patent non-obstructive disease Cath 11-12015 non-obstructive disease     COVID-19 08/2021    Had antibody infusion     Diverticulosis of colon     DVT (deep venous thrombosis), unspecified laterality 11/12/2015    Dysphonia 1/28/2018    Essential hypertension 11/15/2015    Fine motor impairment 2/2/2021    Hyperlipidemia     Hypertensive heart disease with heart failure 5/5/2016    MI (myocardial infarction) 2009    x's 5    Nicotine abuse     Obesity 11/26/2012    Olecranon bursitis of right elbow 9/19/2021    Pulmonary embolism 2011    Renal disorder     LAVERNE    Right carpal tunnel syndrome 4/6/2018    Stented coronary artery 11/26/2012    LAD stent placed 10/17/2007     Trigger thumb of right hand 4/6/2018     Past Surgical History:   Procedure Laterality Date    APPENDECTOMY      BACK SURGERY      CARDIAC DEFIBRILLATOR PLACEMENT      CARDIAC SURGERY  2007    stent    CARPAL TUNNEL RELEASE Right 04/2018    COLONOSCOPY N/A 8/8/2022    Procedure: COLONOSCOPY;  Surgeon: Sascha eKenan MD;  Location: [facility] ENDO (2ND FLR);  Service: Endoscopy;  Laterality: N/A;  EF-15%  pre heart    AICD-St Rodri       COVID test [facility] 8/5-inst mail-am prep-clears 4 hrs prior-tb    ENDOSCOPIC ULTRASOUND OF UPPER GASTROINTESTINAL TRACT N/A 10/4/2022    Procedure: ULTRASOUND, UPPER GI TRACT, ENDOSCOPIC;  Surgeon: Charlie Smith MD;  Location: [facility] ENDO (2ND FLR);  Service: Endoscopy;  Laterality: N/A;    INSERTION OF BIVENTRICULAR IMPLANTABLE CARDIOVERTER-DEFIBRILLATOR (ICD) N/A 5/3/2019    Procedure: INSERTION, ICD, BIVENTRICULAR;  Surgeon: Teofilo Pal MD;  Location: [facility] EP LAB;  Service: Cardiology;  Laterality: N/A;  ICH CM,  ICD UPGD BI-V,  SJM, MAC, FAS, 3PREP (dual ICD INSITU)    r knee scope      REVISION OF SKIN POCKET FOR CARDIOVERTER-DEFIBRILLATOR Left 5/3/2019    Procedure: Revision, Skin Pocket, For Cardioverter-Defibrillator;  Surgeon: Teofilo Pal MD;  Location: [facility] EP LAB;  Service: Cardiology;  Laterality: Left;    RIGHT HEART CATHETERIZATION Right 6/14/2021    Procedure: INSERTION, CATHETER, RIGHT HEART;  Surgeon:  Lizz Nieto MD;  Location: [facility] CATH LAB;  Service: Cardiology;  Laterality: Right;    RIGHT HEART CATHETERIZATION Right 2022    Procedure: INSERTION, CATHETER, RIGHT HEART;  Surgeon: Migue Henry Jr., MD;  Location: [facility] CATH LAB;  Service: Cardiology;  Laterality: Right;    SPINE SURGERY      TONSILLECTOMY      TRIGGER FINGER RELEASE Right 2018    thumb     Family History   Problem Relation Age of Onset    Cancer Mother         colon cancer    Heart disease Mother     Diabetes Mother     Heart disease Father     Stroke Father     Colon polyps Father     Cancer Paternal Grandmother         leukemia    No Known Problems Sister     No Known Problems Daughter     No Known Problems Son     No Known Problems Sister     No Known Problems Son      Social History     Tobacco Use    Smoking status: Former     Packs/day: 1.00     Years: 45.00     Pack years: 45.00     Types: Cigarettes     Quit date: 2005     Years since quittin.8    Smokeless tobacco: Never    Tobacco comments:     1-1.5 ppd every day.   Substance Use Topics    Alcohol use: No    Drug use: No     Review of Systems   Unable to perform ROS: Acuity of condition     Physical Exam     Initial Vitals   BP Pulse Resp Temp SpO2   -- -- -- -- --      MAP       --         Physical Exam    Constitutional:   Obvious distress   HENT:   Head: Normocephalic and atraumatic.   Eyes:   Pupils fixed and dilated   Neck: Neck supple. No tracheal deviation present.   Normal range of motion.  Cardiovascular:            Pulseless  Life Vest in place  Asystole on monitor  Milrinone gtt active   Pulmonary/Chest:   Bipap mask in place w/ rate 18   No respiratory effort  Minimal tidal volumes   Abdominal: He exhibits distension.   Musculoskeletal:         General: No tenderness.      Cervical back: Normal range of motion and neck supple.     Neurological:   GCS 3  Pupils fixed and dilated  Unresponsive to all stimuli   Skin: Capillary refill takes more  than 3 seconds. There is pallor.   Psychiatric:   Unable to assess d/t acuity of condition       ED Course   Procedures  Labs Reviewed   ISTAT PROCEDURE - Abnormal; Notable for the following components:       Result Value    POC Glucose 210 (*)     POC BUN 63 (*)     POC Creatinine 2.6 (*)     POC Sodium 128 (*)     POC TCO2 (MEASURED) 22 (*)     POC Ionized Calcium 0.97 (*)     POC Hematocrit 29 (*)     All other components within normal limits          Imaging Results    None          Medications   phenylephrine HCl in 0.9% NaCl 1 mg/10 mL (100 mcg/mL) syringe 100 mcg (100 mcg Intravenous Given 10/28/22 2002)   phenylephrine HCl in 0.9% NaCl 1 mg/10 mL (100 mcg/mL) syringe 100 mcg (100 mcg Intravenous Given 10/28/22 2003)     Medical Decision Making:   History:   I obtained history from: EMS provider and someone other than patient.       <> Summary of History: DNR/DNI wishes verified by patient, son, significant other   Clinical report given by EMS  Initial Assessment:   Patient presents pulseless with no respiratory effort.   Bedside cardiac ultrasound without any cardiac activity   Family at bedside noted patient is DNR DNI wishes as voice to EMS prior to arrival  No improvement with respiratory support with BiPAP  Patient  at 8:05 p.m.           ED Course as of 10/28/22 2035   Fri Oct 28, 2022   2010 Patient presented for past after sudden onset of shortness of breath.  Of note, patient has chronic decompensated heart failure.  Presents with viral 100.  He presents with a LifeVest in place.  Patient voices DNR DNI wishes to family who are at bedside on presentation.  Patient is provided BiPAP for comfort.  However, he was making no respiratory effort.  A he had no pulse on examination.  He had no cardiac activity on ultrasound.  After discussion with family, resuscitation efforts terminated. [DS]      ED Course User Index  [DS] Ramu Downing MD                 Clinical Impression:   Final  diagnoses:  [I46.9] Cardiopulmonary arrest (Primary)      ED Disposition Condition                     Ramu Downing MD  10/28/22 2035       Ramu Downing MD  10/29/22 9179

## 2022-10-31 ENCOUNTER — DOCUMENT SCAN (OUTPATIENT)
Dept: HOME HEALTH SERVICES | Facility: HOSPITAL | Age: 72
End: 2022-10-31
Payer: MEDICARE

## 2022-11-01 ENCOUNTER — DOCUMENT SCAN (OUTPATIENT)
Dept: HOME HEALTH SERVICES | Facility: HOSPITAL | Age: 72
End: 2022-11-01
Payer: MEDICARE

## 2022-11-02 ENCOUNTER — DOCUMENT SCAN (OUTPATIENT)
Dept: HOME HEALTH SERVICES | Facility: HOSPITAL | Age: 72
End: 2022-11-02
Payer: MEDICARE

## 2022-11-03 ENCOUNTER — DOCUMENT SCAN (OUTPATIENT)
Dept: HOME HEALTH SERVICES | Facility: HOSPITAL | Age: 72
End: 2022-11-03
Payer: MEDICARE

## 2022-11-04 ENCOUNTER — TELEPHONE (OUTPATIENT)
Dept: TRANSPLANT | Facility: HOSPITAL | Age: 72
End: 2022-11-04
Payer: MEDICARE

## 2022-11-06 NOTE — PROGRESS NOTES
Subjective:   Transplant status: declined    HPI:  73 yo WM seen 5/18/22  previously refused LVAD but has reconsidered since hospitalization January 2022 for recurrent VT (he thinks had MI) and cardiogenic shock for which hosp at [facility].  He changed his mind about LVAD while in extremis at [facility] and I accepted in transfer.  While awaiting hospital bed he was transferred to [facility] and was treated for cardiogenic shock there and subsequently sent home on .  He remains on  and is pursuing evaluation for LVAD.  He reports being told at [facility] that they could not place IABP due to aneurysm but our CT abd and pelvis 4/6/22 and report specifically says no aneurysm.     He has had a couple of observation admissions where he was told to be dry at visit June 2022. Aty visit July 2022 noted that he cannot tolerate higher doses of meds due to symptomatic hypotension.  He still notes occasional dizzy spells when he 1st stands but there are not too bad at this time.  Uses Lasix only as needed targeting his home weight 218-220.  I feel that he has continued to gain true body as his baseline dry weight at home previously had been lower to remember to 211-212 lb at the May 2022 visit and that was up from 207-208 lb.  On our scale July visit wt 210# and at prior visit here 194#--he has reversed his weight loss--based upon exam this is not fluid--going to [facility] after visit for routine labs and dressing change    His case was discussed at selection committee and he was deferred pending evaluation of pancreatic mass.  They wish to proceed with MRI but not sure with his ICD if this will be possible.      At visit 8/25/22 He reports his heart failure symptoms are stable.  As long as he paces himself can walk without significant shortness of breath.  He is not experiencing orthopnea or PND.  No symptomatic arrhythmia.  Home weight this AM was 216# and my scale today wt 217#.    Following this visit he was admitted to the hospital with  acute pyelonephritis and bacteremia.  He had get his ICD removed.  He did very poorly during that hospital stay.  His case was discussed at selection October 19, 2022 Patient discussed with committee today 10/19/2022 to determine LVAD candidacy. Patient declined for LVAD at this time due to being too ill secondary to worsening renal and pulmonary function.     He was sent home with [facility] and  on IV Abx (recent bacteremia) and Dobutamine infusion.       Since discharge he continues to feel poorly.  Very weak.  No recurrent fever chills.  He is generally not short of breath at rest but at times can be.  He sleeps propped up on his side.    Past Medical History:   Diagnosis Date    Acute hypoxemic respiratory failure 11/7/2015    Acute idiopathic gout of left knee 12/2/2019    Acute idiopathic gout of right elbow 9/23/2021    AICD (automatic cardioverter/defibrillator) present 12/13/2015      Anticoagulant long-term use     Bronchopneumonia 12/20/2021    CHF (congestive heart failure)     Chronic anticoagulation 5/5/2016    Chronic combined systolic and diastolic heart failure 11/26/2012    EF 10-20% on ECHO 2013    Chronic gout 12/2/2019    Clotting disorder     Coronary artery disease involving native coronary artery of native heart without angina pectoris 11/26/2012    Cath 10- Stents patent non-obstructive disease Cath 11-12015 non-obstructive disease     COVID-19 08/2021    Had antibody infusion    Diverticulosis of colon     DVT (deep venous thrombosis), unspecified laterality 11/12/2015    Dysphonia 1/28/2018    Essential hypertension 11/15/2015    Fine motor impairment 2/2/2021    Hyperlipidemia     Hypertensive heart disease with heart failure 5/5/2016    MI (myocardial infarction) 2009    x's 5    Nicotine abuse     Obesity 11/26/2012    Olecranon bursitis of right elbow 9/19/2021    Pulmonary embolism 2011    Renal disorder     LAVERNE    Right carpal tunnel syndrome 4/6/2018    Stented  coronary artery 11/26/2012    LAD stent placed 10/17/2007     Trigger thumb of right hand 4/6/2018     Past Surgical History:   Procedure Laterality Date    APPENDECTOMY      BACK SURGERY      CARDIAC DEFIBRILLATOR PLACEMENT      CARDIAC SURGERY  2007    stent    CARPAL TUNNEL RELEASE Right 04/2018    COLONOSCOPY N/A 8/8/2022    Procedure: COLONOSCOPY;  Surgeon: Sascha Keenan MD;  Location: [facility] ENDO (2ND FLR);  Service: Endoscopy;  Laterality: N/A;  EF-15%  pre heart    AICD-St Rodri       [place]OVID [place] [facility] [place]inst mail-am prep-clears 4 hrs prior-tb    ENDOSCOPIC ULTRASOUND OF UPPER GASTROINTESTINAL TRACT N/A 10/4/2022    Procedure: ULTRASOUND, UPPER GI TRACT, ENDOSCOPIC;  Surgeon: Charlie Smith MD;  Location: [facility] ENDO (2ND FLR);  Service: Endoscopy;  Laterality: N/A;    INSERTION OF BIVENTRICULAR IMPLANTABLE CARDIOVERTER-DEFIBRILLATOR (ICD) N/A 5/3/2019    Procedure: INSERTION, ICD, BIVENTRICULAR;  Surgeon: Teofilo Pal MD;  Location: [facility] EP LAB;  Service: Cardiology;  Laterality: N/A;  ICH CM,  ICD UPGD BI-V,  SJM, MAC, FAS, 3PREP (dual ICD INSITU)    r knee scope      REVISION OF SKIN POCKET FOR CARDIOVERTER-DEFIBRILLATOR Left 5/3/2019    Procedure: Revision, Skin Pocket, For Cardioverter-Defibrillator;  Surgeon: Teofilo Pal MD;  Location: [facility] EP LAB;  Service: Cardiology;  Laterality: Left;    RIGHT HEART CATHETERIZATION Right 6/14/2021    Procedure: INSERTION, CATHETER, RIGHT HEART;  Surgeon: Lizz Nieto MD;  Location: [facility] CATH LAB;  Service: Cardiology;  Laterality: Right;    RIGHT HEART CATHETERIZATION Right 6/17/2022    Procedure: INSERTION, CATHETER, RIGHT HEART;  Surgeon: Migue Henry Jr., MD;  Location: [facility] CATH LAB;  Service: Cardiology;  Laterality: Right;    SPINE SURGERY      TONSILLECTOMY      TRIGGER FINGER RELEASE Right 04/2018    thumb       Review of Systems   Constitutional: Positive for decreased appetite, malaise/fatigue and weight loss. Negative for  "chills, fever, night sweats and weight gain.   Cardiovascular:  Positive for dyspnea on exertion (He is not as functional as he was prior to him being admitted with pyelonephritis), leg swelling and orthopnea. Negative for chest pain, irregular heartbeat, near-syncope, palpitations, paroxysmal nocturnal dyspnea and syncope.   Respiratory:  Positive for cough, sleep disturbances due to breathing (intolerant to CPAP), snoring and sputum production. Negative for wheezing.    Hematologic/Lymphatic: Bruises/bleeds easily (bruising).   Musculoskeletal:  Positive for back pain, joint pain, muscle weakness and stiffness. Negative for arthritis.   Gastrointestinal:  Positive for bloating, anorexia, nausea and vomiting (Sometimes). Negative for change in bowel habit, hematochezia and melena.   Genitourinary:  Negative for hematuria.   Neurological:  Positive for excessive daytime sleepiness, dizziness, light-headedness, numbness (numbness of the right foot and leg as well as the left arm.  Occasionally have sharp shooting pains in the right leg) and weakness. Negative for brief paralysis, focal weakness and seizures.   Psychiatric/Behavioral:  Positive for depression. The patient has insomnia and is nervous/anxious.    Objective:   Blood pressure (!) 96/56, pulse 99, height 5' 7" (1.702 m), weight 87.1 kg (192 lb).body mass index is 30.07 kg/m².  Physical Exam  Constitutional:       General: He is not in acute distress.     Appearance: He is well-developed. He is not diaphoretic.      Comments: BP (!) 96/56   Pulse 99   Ht 5' 7" (1.702 m)   Wt 87.1 kg (192 lb)   BMI 30.07 kg/m²   Last 2 visits wt 216 and 220#  /63 (BP Location: Left arm, Patient Position: Sitting, BP Method: Pediatric   Friendly WM who appears chronically ill but is not in acute distress   HENT:      Head: Normocephalic and atraumatic.   Eyes:      General: No scleral icterus.        Right eye: No discharge.         Left eye: No discharge.      " "Conjunctiva/sclera: Conjunctivae normal.   Neck:      Thyroid: No thyromegaly.      Vascular: No JVD.      Comments: Neck veins are not visible in the sitting position  Cardiovascular:      Rate and Rhythm: Normal rate and regular rhythm.      Heart sounds: Murmur (Grade 1/6 systolic murmur base and LSB) heard.     Gallop present.   Pulmonary:      Effort: Pulmonary effort is normal.      Breath sounds: Normal breath sounds.   Abdominal:      General: Bowel sounds are normal. There is no distension.      Palpations: Abdomen is soft. There is hepatomegaly (liver span 16 cm).      Tenderness: There is no abdominal tenderness.      Comments: Liver span 16 cm   Musculoskeletal:         General: Swelling (1+) present. No tenderness.   Skin:     General: Skin is warm and dry.   Neurological:      General: No focal deficit present.      Mental Status: He is alert and oriented to person, place, and time. Mental status is at baseline.      Motor: No weakness.   Psychiatric:         Behavior: Behavior normal.         Thought Content: Thought content normal.      Comments: Depressed and sad as he is recognizing that the end of his life is close but is accepting of this fact.  He states, "I did all the fighting I could"     Lab Results   Component Value Date     (H) 09/14/2022     (L) 10/20/2022    K 3.9 10/20/2022    MG 2.5 10/20/2022    CL 84 (L) 10/20/2022    CO2 27 10/20/2022    PHOS 4.0 10/15/2022    BUN 48 (H) 10/20/2022    CREATININE 3.1 (H) 10/20/2022     (H) 10/20/2022    HGBA1C 5.6 05/28/2022    AST 21 10/13/2022    ALT 19 10/13/2022    ALBUMIN 2.8 (L) 10/13/2022    PROT 6.7 10/13/2022    BILITOT 0.8 10/13/2022    WBC 14.27 (H) 10/20/2022    HGB 8.5 (L) 10/20/2022    HCT 29 (L) 10/28/2022     10/20/2022    INR 1.0 10/01/2022    INR 2.3 (H) 12/29/2011     (H) 09/14/2022    TSH 2.834 06/22/2022    FREET4 0.99 06/22/2022    CHOL 145 05/18/2022    HDL 50 05/18/2022    LDLCALC 57.8 (L) " 05/18/2022    TRIG 186 (H) 05/18/2022     Lab Results   Component Value Date     (H) 09/14/2022     (H) 09/12/2022     (H) 09/06/2022       Outside labs from [facility] October 24, 2022:  Glucose 148, BUN 52, creatinine 2.1, sodium 132, potassium 3.6, , ALT 77, alkaline phosphatase 232, hemoglobin 8.6, hematocrit 26.4, CPK 50, platelet [id number]  Assessment:      1. Chronic combined systolic and diastolic heart failure    2. Nausea    3. H/O ventricular tachycardia    4. Cardiomyopathy, ischemic    5. Coronary artery disease involving native coronary artery of native heart without angina pectoris    6. Hypertensive cardiovascular-renal disease, stage 1-4 or unspecified chronic kidney disease, with heart failure    7. Mixed hyperlipidemia    8. Presence of cardiac resynchronization therapy defibrillator (CRT-D)        Plan:   Patient is now  on home  functions as NYHA Class 3-4  I had a long discussion with the patient, his girlfriend in his sister.  He wanted me to be gail regarding his survival odds.  I explained that his 6 month mortality rate is 90% and 3 months 50%.  Hospice is definitely a consideration but I do not know if they would allow him to continue his IV antibiotics.  Therefore, if he wants to continue those treatments he would need to stay in home health.  He is not sure he wants to enter hospice right now but does want to stay in the AIM program.    At this time his defibrillators activity wants to keep it that way    In view of his nausea will reduce amiodarone to 200 mg once daily and I have asked him to take it with this much food as he can tolerate    I gave a prescription for Phenergan as Zofran was not working    Recommend 2 gram sodium restriction and 1500cc fluid restriction.  Encourage physical activity with graded exercise program.  Requested patient to weigh themselves daily, and to notify us if their weight increases by more than 3 lbs in 1 day or 5 lbs in 1 week.      Decline for both heart transplant and LVAD    He will be followed in the Heart failure Section    Return to clinic 1 month    UNOS Patient Status  Functional Status: 50% - Requires considerable assistance and frequent medical care  Physical Capacity: Limited Mobility due to weakness from his heart failure/chronic illness  Working for Income: No  If no, reason not working: Disability
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
## 2023-03-25 NOTE — ANESTHESIA POSTPROCEDURE EVALUATION
Anesthesia Post Evaluation    Patient: Audrey Oneil JrFly    Procedure(s) Performed: Procedure(s) (LRB):  ECHOCARDIOGRAM,TRANSESOPHAGEAL (N/A)    Final Anesthesia Type: general      Patient location during evaluation: floor  Patient participation: Yes- Able to Participate  Level of consciousness: awake and alert and oriented  Post-procedure vital signs: reviewed and stable  Pain management: adequate  Airway patency: patent    PONV status at discharge: No PONV  Anesthetic complications: no      Cardiovascular status: blood pressure returned to baseline, hemodynamically stable and stable  Respiratory status: unassisted, room air and spontaneous ventilation  Hydration status: euvolemic  Follow-up not needed.          Vitals Value Taken Time   /58 09/22/22 0456   Temp 37 °C (98.6 °F) 09/22/22 0456   Pulse 77 09/22/22 0539   Resp 19 09/22/22 0456   SpO2 95 % 09/22/22 0456         Event Time   Out of Recovery 09/21/2022 18:18:00         Pain/Magaly Score: Pain Rating Prior to Med Admin: 3 (9/21/2022  9:45 PM)  Magaly Score: 9 (9/21/2022  5:30 PM)         Burow's Graft Text: The defect edges were debeveled with a #15 scalpel blade.  Given the location of the defect, shape of the defect, the proximity to free margins and the presence of a standing cone deformity a Burow's skin graft was deemed most appropriate. The standing cone was removed and this tissue was then trimmed to the shape of the primary defect. The adipose tissue was also removed until only dermis and epidermis were left.  The skin margins of the secondary defect were undermined to an appropriate distance in all directions utilizing iris scissors.  The secondary defect was closed with interrupted buried subcutaneous sutures.  The skin edges were then re-apposed with running  sutures.  The skin graft was then placed in the primary defect and oriented appropriately.

## 2023-08-23 NOTE — TELEPHONE ENCOUNTER
Patient requested different provider due to Dr. Copeland's schedule ending in 10/2023. New prep needed due to patient mixing prep before canceling previos procedure.   Started and placed on your desk for completion. Please give it to Tory to get it faxed to [facility] (fax number on the form)

## 2024-02-23 NOTE — ASSESSMENT & PLAN NOTE
Septicemia on admission due to MRSA bacteremia. Fever resolved and without leukocytosis. Blood cultures 9/14-9/16 positive for MRSA. Repeat blood cxrs collected (09/18): NGTD.  · Continue IV-Vancomycin; with continued monitoring for Therapeutic Vanc trough (14.3 on (09/19)  · If signs of worsening renal function or unable to achieve therapeutic levels (15-20), or significant adverse drug reactions-- recommend switching to Daptomycin.  · PharmD service to manage vancomycin dosing and drug monitoring; goal trough 15-20.  · Recommend abx treatment duration of 6wks s/p ICD-removal or negative blood cxrs--whichever occurs later  · EP consulted for device extraction given bacteremia with MRSA; pending HARRY (09/21), plan for potential device removal next week.  · Will continue to follow pt    24

## 2025-01-28 NOTE — ASSESSMENT & PLAN NOTE
71 yo male 2 days s/p EUS + FNA for cystic lesion of pancreas, suspicious for IPMN with cytology negative for malignancy.     Recommendations:  -Diet as tolerated  -Continue current medications  -Follow up in [facility] clinic in 6 months (April 2023) for pancreas cyst surveillance     Detail Level: Detailed Depth Of Biopsy: dermis Was A Bandage Applied: Yes Size Of Lesion In Cm: 0 Biopsy Type: H and E Biopsy Method: 15 blade Anesthesia Type: 1% lidocaine with epinephrine Anesthesia Volume In Cc: 0.5 Hemostasis: Drysol Wound Care: Petrolatum Dressing: bandage Destruction After The Procedure: No Type Of Destruction Used: Curettage Curettage Text: The wound bed was treated with curettage after the biopsy was performed. Cryotherapy Text: The wound bed was treated with cryotherapy after the biopsy was performed. Electrodesiccation Text: The wound bed was treated with electrodesiccation after the biopsy was performed. Electrodesiccation And Curettage Text: The wound bed was treated with electrodesiccation and curettage after the biopsy was performed. Silver Nitrate Text: The wound bed was treated with silver nitrate after the biopsy was performed. Lab: -081 Medical Necessity Information: It is in your best interest to select a reason for this procedure from the list below. All of these items fulfill various CMS LCD requirements except the new and changing color options. Consent: Written consent was obtained and risks were reviewed including but not limited to scarring, infection, bleeding, scabbing, incomplete removal, nerve damage and allergy to anesthesia. Post-Care Instructions: I reviewed with the patient in detail post-care instructions. Patient is to keep the biopsy site dry overnight, and then apply bacitracin twice daily until healed. Patient may apply hydrogen peroxide soaks to remove any crusting. Notification Instructions: Patient will be notified of biopsy results. However, patient instructed to call the office if not contacted within 2 weeks. Billing Type: Third-Party Bill Information: Selecting Yes will display possible errors in your note based on the variables you have selected. This validation is only offered as a suggestion for you. PLEASE NOTE THAT THE VALIDATION TEXT WILL BE REMOVED WHEN YOU FINALIZE YOUR NOTE. IF YOU WANT TO FAX A PRELIMINARY NOTE YOU WILL NEED TO TOGGLE THIS TO 'NO' IF YOU DO NOT WANT IT IN YOUR FAXED NOTE.

## 2025-02-07 NOTE — DISCHARGE SUMMARY
Discharge Summary  Hospital Medicine    I personally performed the history, physical exam and medical decision making: and confirmed the accuracy of the information in the transcribed note.  Tristan Barnes M.D.  Pager: [phone]  Cell Phone: [phone]    Attending Provider on Discharge: Tristan Barnes MD  Hospital Medicine Team: [facility]  Date of Admission:  8/29/2018     Date of Discharge: 8/30/2018 8/30/2018  Code status: Full Code    Active Hospital Problems    Diagnosis  POA    *SOB (shortness of breath) [R06.02]  Yes     -SOB cannot be explained by Heart failure.  -He is not volume overloaded on exam.  -Given the history of PE and SOB with hypoxia on exam, will need to rule out PE.    Recommendations:  -Place in observation under Hospital medicine, can consult HTS for assistance in the morning.  -Patient allergic to Iodine, will get VQ scan to rule out PE.  -Trend troponin and EKG.  -Will give lasix 40 IV mgs X 1 in the ER.  -Continue with home medications.     Discussed with Dr Restrepo, Staff HTS.         Congestive heart failure [I50.9]  Yes      Resolved Hospital Problems   No resolved problems to display.        HPI  67yo male with hx chf, cad, htn, VT, ICD here w/ chest tightness that was present when he awoke around 9am.  No cp.  No cough.  No new/worse edema. States he is compliant with meds, fluid restriction and low-sodium diet.  Notes he felt worse laying down but better when standing. Sxs did not get worse w/ walking.  He feels less SOB now than when he woke but not at his baseline.      Hospital Course  67 yo male on background significant for CHF, CAD, STEMI (s/p PCI)  HTN, VT (s/p ICD), and PE (completed anticoagulation course) admitted for dyspnea, possibly associated with CHF. Clinically, the patient did not appear volume overloaded and his BNP was WNL. Last 2D Echo (4/2018) reported an EF 15-20% with diastolic dysfunction. Troponins were mildly elevated at 0.038, but down-trending. EKG  was non-ischemic with NSR and LBBB; V/Q scan did not indicate a PE. The patient was managed with IV Lasix and cardiology recommended continuing home medications: amiodarone, ASA, clopidogrel, Toprol.     Recent Labs   Lab  08/29/18   1207   WBC  6.33   HGB  13.7*   HCT  42.2   PLT  235     Recent Labs   Lab  08/29/18   1207  08/30/18   0550   NA  142  142   K  3.7  3.2*   CL  104  103   CO2  27  25   BUN  19  23   CREATININE  1.1  1.2   GLU  95  89   CALCIUM  9.1  9.0   MG  2.2  1.9   PHOS   --   4.0     Recent Labs   Lab  08/29/18   1207   ALKPHOS  113   ALT  39   AST  27   ALBUMIN  3.6   PROT  6.6   BILITOT  0.5   INR  0.9        Recent Labs      08/29/18   1207  08/30/18   0550   TROPONINI  0.038*  0.035*       Procedures: none    Consultants: Cardiology     Discharge Medication List as of 8/30/2018 10:20 AM      CONTINUE these medications which have NOT CHANGED    Details   allopurinol (ZYLOPRIM) 100 MG tablet TAKE 1 TABLET(100 MG) BY MOUTH EVERY DAY, Normal      amiodarone (PACERONE) 200 MG Tab Take 1 tablet (200 mg total) by mouth once daily., Starting Thu 8/16/2018, Normal      aspirin (ECOTRIN) 325 MG EC tablet Take 325 mg by mouth once daily., Until Discontinued, Historical Med      atorvastatin (LIPITOR) 80 MG tablet TAKE 1 TABLET(80 MG) BY MOUTH EVERY DAY, Normal      clopidogrel (PLAVIX) 75 mg tablet Take 1 tablet (75 mg total) by mouth once daily., Starting Tue 6/26/2018, Normal      diphenhydrAMINE (BENADRYL) 50 MG tablet Take 1 tab at 6 pm & 11 pm on night before your proc, then 1 tab at 6 am on day of proc, Normal      furosemide (LASIX) 40 MG tablet TAKE 1 TABLET(40 MG) BY MOUTH EVERY DAY, Normal      HYDROcodone-acetaminophen (NORCO)  mg per tablet Take 1 tablet by mouth every 6 (six) hours as needed for Pain., Historical Med      metoprolol succinate (TOPROL-XL) 50 MG 24 hr tablet TAKE 1 TABLET BY MOUTH EVERY DAY, Normal      docusate sodium (COLACE) 50 MG capsule Take 1 capsule (50 mg  total) by mouth 2 (two) times daily., Starting Fri 4/6/2018, Print      predniSONE (DELTASONE) 50 MG Tab Take 1 tab at 6 pm & 11 pm on night before your proc, then 1 tab at 6 am on day of proc, Normal             Discharge Diet:cardiac diet with Fluid restriction 1500mL per day    Activity: activity as tolerated    Discharge Condition: Stable    Disposition: Home or Self Care    Tests pending at the time of discharge: none      Time spent  on the discharge of the patient including review of hospital course with the patient, reviewing discharge medications and arranging follow-up care     Discharge examination Patient was seen and examined on the date of discharge and determined to be suitable for discharge.    Discharge plan and follow up:      Future Appointments   Date Time Provider Department Center   9/6/2018 10:00 AM LAB, [facility]POINTMENT [facility]   9/6/2018  1:30 PM Breezy Gongora MD [facility] HEARTTX [facility]   9/11/2018 10:30 AM [facility]   9/18/2018  1:40 PM [facility]   9/20/2018  9:15 AM January Khan MD [facility] IM [facility]   10/23/2018  8:00 AM HOME MONITOR DEVICE CHECK, [facility] NOM[facility] AR[facility]HY[facility]   11/16/2018  2:00 PM LAB, AP[facility]MENT [facility] LAB P [facility]   11/16/2018  3:30 PM Edison Marshall MD [facility] HEARTTX [facility]   1/22/2019  8:00 AM HOME MONITOR DEVICE CHECK, [facility]       Provider  Tristan Barnes MD    Scribe Attestation: I personally scribed for No att. providers found on 08/30/2018 at 11:35 AM. Electronically signed by jessenia Beaulieu on 08/30/2018 at 11:35 AM.       No

## (undated) DEVICE — COVER SNAP 36IN X 30IN

## (undated) DEVICE — ELECTRODE REM PLYHSV RETURN 9

## (undated) DEVICE — SUT MCRYL PLUS 4-0 PS2 27IN

## (undated) DEVICE — GUIDEWIRE CPS DIRECT PL 47CM

## (undated) DEVICE — DRAPE STERI 32 X 50

## (undated) DEVICE — TRAY CATH FOL SIL URIMTR 16FR

## (undated) DEVICE — KIT SHEATH GLIDELIGHT LSR 14FR

## (undated) DEVICE — SOL 9P NACL IRR PIC IL

## (undated) DEVICE — NDL 22GA X1 1/2 REG BEVEL

## (undated) DEVICE — CATH ALL PUR URTHL RR 20FR

## (undated) DEVICE — STYLET 52CM

## (undated) DEVICE — BANDAGE ELASTIC 2X5 VELCRO ST

## (undated) DEVICE — TAPE SURG DURAPORE 2 SGL USE

## (undated) DEVICE — CATH EMBOLECTOMY 6FR

## (undated) DEVICE — PAD DEFIB CADENCE ADULT R2

## (undated) DEVICE — LINE PRESSURE MONITORING 96IN

## (undated) DEVICE — CATH PRUITT IRRIG 9F OCCLUS

## (undated) DEVICE — SUT ETHILON 3-0 PS2 18 BLK

## (undated) DEVICE — SUT MONOCRYL 3-0 SH U/D

## (undated) DEVICE — KIT MICROINTRODUCE MINI 5X10CM

## (undated) DEVICE — SET DECANTER MEDICHOICE

## (undated) DEVICE — CATH RESPONSE QPLR JSN 6F 120

## (undated) DEVICE — LOOP VESSEL BLUE MAXI

## (undated) DEVICE — CATH EMBOLECTOMY 4F REGULAR

## (undated) DEVICE — PACK PACER PERMANENT

## (undated) DEVICE — NDL 18GA X1 1/2 REG BEVEL

## (undated) DEVICE — PROSTHESIS TRIA AIRE SIZE 10

## (undated) DEVICE — DRAPE INCISE IOBAN 2 23X17IN

## (undated) DEVICE — KIT WRENCH

## (undated) DEVICE — ADHESIVE DERMABOND ADVANCED

## (undated) DEVICE — COVER TABLE 44X90 STERILE

## (undated) DEVICE — APPLICATOR CHLORAPREP ORN 26ML

## (undated) DEVICE — SEE MEDLINE ITEM 153688

## (undated) DEVICE — SYR MED RAD 150ML

## (undated) DEVICE — CATH EMBOLECTOMY 5F REGULAR

## (undated) DEVICE — OIL SILICONE SJM

## (undated) DEVICE — DRESSING N ADH OIL EMUL 3X3

## (undated) DEVICE — SET BASIN 48X48IN 6000ML RING

## (undated) DEVICE — BANDAGE ELASTOPLAST 3INX5YDS

## (undated) DEVICE — DRAPE STERI-DRAPE 1000 17X11IN

## (undated) DEVICE — CATH BRIDGE OCCL BLLN 80CM

## (undated) DEVICE — SHEATH INTRODUCER 7FR 11CM

## (undated) DEVICE — WIRE GUIDE WHOLEY MOD J .035

## (undated) DEVICE — SUT 7/0 24IN PROLENE BL MO

## (undated) DEVICE — PACK UNIVERSAL SPLIT II

## (undated) DEVICE — TAPE UMBILICAL 1/8X36IN WHITE

## (undated) DEVICE — SEE MEDLINE ITEM 156894

## (undated) DEVICE — SEE MEDLINE ITEM 157150

## (undated) DEVICE — SEE MEDLINE ITEM 152515

## (undated) DEVICE — SUT 3-0 12-18IN SILK

## (undated) DEVICE — CANNULA VESSEL

## (undated) DEVICE — SEE MEDLINE ITEM 157131

## (undated) DEVICE — SUT MONOCRYL 4-0 PS-2

## (undated) DEVICE — SHEATH INTRODUCER 9FR 11CM

## (undated) DEVICE — GLOVE BIOGEL SKINSENSE PI 7.0

## (undated) DEVICE — STOCKINET TUBULAR 1 PLY 6X60IN

## (undated) DEVICE — BLADE PLASMA WIDE SPATULA TIP

## (undated) DEVICE — SET INFUSION WINGED 19GA X 3/4

## (undated) DEVICE — KIT MICROINTRO 4F .018X40X7CM

## (undated) DEVICE — DRESSING TRANS 4X4 TEGADERM

## (undated) DEVICE — TOURNIQUET SB QC SP 18X4IN

## (undated) DEVICE — CATH BLLN COR SINUS VENOGRAPHY

## (undated) DEVICE — GAUZE SPONGE 4X4 12PLY

## (undated) DEVICE — BOOT SUTURE AID

## (undated) DEVICE — SYR ONLY LUER LOCK 20CC

## (undated) DEVICE — FORCEP BIPOLAR ADSON 1MM TIP

## (undated) DEVICE — CATH EMBOLECTOMY 3F REGULAR

## (undated) DEVICE — WIRE WHISPER MS 014 X 190

## (undated) DEVICE — PAD CAST SPECIALIST STRL 4

## (undated) DEVICE — SUT 2-0 12-18IN SILK

## (undated) DEVICE — STOCKINET 4INX48

## (undated) DEVICE — SYR SLIP TIP 1CC

## (undated) DEVICE — CATH SWAN GANZ STND 7FR

## (undated) DEVICE — SYR 10CC LUER LOCK

## (undated) DEVICE — SET MICROPUNCT 5FRMPIS-501

## (undated) DEVICE — CLIP MED TICALL

## (undated) DEVICE — PPM CONVERTORS DRAPE

## (undated) DEVICE — SHEATH TIGHTRAL SUB-C 11FR

## (undated) DEVICE — SUT PROLENE 6-0 BV-1 30IN

## (undated) DEVICE — PACK UPPER EXTREMITY BAPTIST

## (undated) DEVICE — DRAPE PLASTIC U 60X72

## (undated) DEVICE — SUT LIGACLIP SMALL XTRA

## (undated) DEVICE — Device

## (undated) DEVICE — ELECTRODE POLYHESIVEPRE-ATTACH

## (undated) DEVICE — GUIDEWIRE CPS COURIER X-FIRM

## (undated) DEVICE — SET MICROPUNCTURE 5FR 501NT

## (undated) DEVICE — SUT ETHILON 4-0 PS2 18 BLK

## (undated) DEVICE — BANDAGE ELASTIC ACE 2IN 10/CA

## (undated) DEVICE — STOPCOCK 1 WAY PLASTIC

## (undated) DEVICE — SEE MEDLINE ITEM 152622

## (undated) DEVICE — SUT 4/0 18IN ETHILON BL P3

## (undated) DEVICE — ADAPTER CPS SAFESHEATH

## (undated) DEVICE — SUT 2-0 VICRYL / SH (J417)

## (undated) DEVICE — TRAY MINOR GEN SURG

## (undated) DEVICE — KIT PROBE COVER WITH GEL

## (undated) DEVICE — CUTTER LEAD

## (undated) DEVICE — SUT PROLENE 6-0 C-1 30IN BL

## (undated) DEVICE — SUT 4-0 12-18IN SILK BLACK

## (undated) DEVICE — SLEEVE SUTURE SLIT 4.5FR

## (undated) DEVICE — DRESSING AQUACEL AG ADV 3.5X6

## (undated) DEVICE — DRAPE C ARM 42 X 120 10/BX

## (undated) DEVICE — KIT LEAD LOCKING DEVICE ACC

## (undated) DEVICE — DILATOR VISISHEATH 14FR 43CM

## (undated) DEVICE — SUT 3-0 VICRYL / RB-1

## (undated) DEVICE — DEVICE LEAD EXTRACTION 2 65CM

## (undated) DEVICE — SLING AND SWATHE UNIV ADLT

## (undated) DEVICE — STYLET 65CM

## (undated) DEVICE — CONTAINER SPECIMEN STRL 4OZ

## (undated) DEVICE — BLADE SURG CARBON STEEL SZ11

## (undated) DEVICE — PAD CAST 2 IN X 4YDS STERILE

## (undated) DEVICE — NDL PERCUTANEOUS ENTRYBSDN 18

## (undated) DEVICE — SYR 3CC LUER LOC

## (undated) DEVICE — CATH PRUITT IRRIG 5FR OCCLUS

## (undated) DEVICE — STOPCOCK 3-WAY

## (undated) DEVICE — SEE MEDLINE ITEM 157173

## (undated) DEVICE — CATH PRUITT IRRIG 4FROCCLUS

## (undated) DEVICE — KIT BRIDGE ACCESSORY

## (undated) DEVICE — BANDAGE CONFORM 3IN STRL